# Patient Record
Sex: FEMALE | Employment: UNEMPLOYED | URBAN - NONMETROPOLITAN AREA
[De-identification: names, ages, dates, MRNs, and addresses within clinical notes are randomized per-mention and may not be internally consistent; named-entity substitution may affect disease eponyms.]

---

## 2024-05-26 ENCOUNTER — HOSPITAL ENCOUNTER (INPATIENT)
Facility: HOSPITAL | Age: 54
LOS: 7 days | Discharge: HOME | DRG: 291 | End: 2024-06-02
Attending: EMERGENCY MEDICINE | Admitting: HOSPITALIST
Payer: COMMERCIAL

## 2024-05-26 ENCOUNTER — APPOINTMENT (EMERGENCY)
Dept: CT IMAGING | Facility: HOSPITAL | Age: 54
DRG: 291 | End: 2024-05-26
Payer: COMMERCIAL

## 2024-05-26 DIAGNOSIS — J18.9 MULTIFOCAL PNEUMONIA: Primary | ICD-10-CM

## 2024-05-26 DIAGNOSIS — D86.9 SARCOIDOSIS: ICD-10-CM

## 2024-05-26 DIAGNOSIS — I50.9 CHF (CONGESTIVE HEART FAILURE) (HCC): ICD-10-CM

## 2024-05-26 DIAGNOSIS — R91.8 BILATERAL PULMONARY INFILTRATES: ICD-10-CM

## 2024-05-26 PROBLEM — I10 PRIMARY HYPERTENSION: Status: ACTIVE | Noted: 2024-05-26

## 2024-05-26 PROBLEM — E87.6 HYPOKALEMIA: Status: ACTIVE | Noted: 2024-05-26

## 2024-05-26 PROBLEM — E66.01 CLASS 3 SEVERE OBESITY DUE TO EXCESS CALORIES IN ADULT (HCC): Status: ACTIVE | Noted: 2024-05-26

## 2024-05-26 PROBLEM — Z85.060 HISTORY OF MALIGNANT CARCINOID TUMOR OF SMALL INTESTINE: Status: ACTIVE | Noted: 2024-05-26

## 2024-05-26 PROBLEM — M79.7 FIBROMYALGIA: Status: ACTIVE | Noted: 2024-05-26

## 2024-05-26 PROBLEM — D61.818 PANCYTOPENIA (HCC): Status: ACTIVE | Noted: 2024-05-26

## 2024-05-26 PROBLEM — E66.813 CLASS 3 SEVERE OBESITY DUE TO EXCESS CALORIES IN ADULT (HCC): Status: ACTIVE | Noted: 2024-05-26

## 2024-05-26 PROBLEM — D50.9 IRON DEFICIENCY ANEMIA: Status: ACTIVE | Noted: 2024-05-26

## 2024-05-26 PROBLEM — K74.69 OTHER CIRRHOSIS OF LIVER (HCC): Status: ACTIVE | Noted: 2024-05-26

## 2024-05-26 PROBLEM — F41.9 ANXIETY: Status: ACTIVE | Noted: 2024-05-26

## 2024-05-26 LAB
2HR DELTA HS TROPONIN: 0 NG/L
4HR DELTA HS TROPONIN: 0 NG/L
ALBUMIN SERPL BCP-MCNC: 3.4 G/DL (ref 3.5–5)
ALP SERPL-CCNC: 178 U/L (ref 34–104)
ALT SERPL W P-5'-P-CCNC: 15 U/L (ref 7–52)
ANION GAP SERPL CALCULATED.3IONS-SCNC: 6 MMOL/L (ref 4–13)
APTT PPP: 28 SECONDS (ref 23–37)
AST SERPL W P-5'-P-CCNC: 38 U/L (ref 13–39)
BASOPHILS # BLD AUTO: 0.04 THOUSANDS/ÂΜL (ref 0–0.1)
BASOPHILS NFR BLD AUTO: 2 % (ref 0–1)
BILIRUB SERPL-MCNC: 1.48 MG/DL (ref 0.2–1)
BNP SERPL-MCNC: 133 PG/ML (ref 0–100)
BUN SERPL-MCNC: 6 MG/DL (ref 5–25)
CALCIUM ALBUM COR SERPL-MCNC: 8.6 MG/DL (ref 8.3–10.1)
CALCIUM SERPL-MCNC: 8.1 MG/DL (ref 8.4–10.2)
CARDIAC TROPONIN I PNL SERPL HS: 17 NG/L
CHLORIDE SERPL-SCNC: 105 MMOL/L (ref 96–108)
CO2 SERPL-SCNC: 29 MMOL/L (ref 21–32)
CREAT SERPL-MCNC: 0.57 MG/DL (ref 0.6–1.3)
EOSINOPHIL # BLD AUTO: 0.04 THOUSAND/ÂΜL (ref 0–0.61)
EOSINOPHIL NFR BLD AUTO: 2 % (ref 0–6)
ERYTHROCYTE [DISTWIDTH] IN BLOOD BY AUTOMATED COUNT: 14 % (ref 11.6–15.1)
FLUAV RNA RESP QL NAA+PROBE: NEGATIVE
FLUBV RNA RESP QL NAA+PROBE: NEGATIVE
GFR SERPL CREATININE-BSD FRML MDRD: 105 ML/MIN/1.73SQ M
GLUCOSE SERPL-MCNC: 122 MG/DL (ref 65–140)
HCT VFR BLD AUTO: 33.1 % (ref 34.8–46.1)
HGB BLD-MCNC: 10.7 G/DL (ref 11.5–15.4)
IMM GRANULOCYTES # BLD AUTO: 0.01 THOUSAND/UL (ref 0–0.2)
IMM GRANULOCYTES NFR BLD AUTO: 0 % (ref 0–2)
INR PPP: 1.27 (ref 0.84–1.19)
LACTATE SERPL-SCNC: 1.8 MMOL/L (ref 0.5–2)
LIPASE SERPL-CCNC: 11 U/L (ref 11–82)
LYMPHOCYTES # BLD AUTO: 0.41 THOUSANDS/ÂΜL (ref 0.6–4.47)
LYMPHOCYTES NFR BLD AUTO: 16 % (ref 14–44)
MAGNESIUM SERPL-MCNC: 1.7 MG/DL (ref 1.9–2.7)
MCH RBC QN AUTO: 33.2 PG (ref 26.8–34.3)
MCHC RBC AUTO-ENTMCNC: 32.3 G/DL (ref 31.4–37.4)
MCV RBC AUTO: 103 FL (ref 82–98)
MONOCYTES # BLD AUTO: 0.14 THOUSAND/ÂΜL (ref 0.17–1.22)
MONOCYTES NFR BLD AUTO: 6 % (ref 4–12)
NEUTROPHILS # BLD AUTO: 1.9 THOUSANDS/ÂΜL (ref 1.85–7.62)
NEUTS SEG NFR BLD AUTO: 74 % (ref 43–75)
NRBC BLD AUTO-RTO: 0 /100 WBCS
PLATELET # BLD AUTO: 64 THOUSANDS/UL (ref 149–390)
PMV BLD AUTO: 9.7 FL (ref 8.9–12.7)
POTASSIUM SERPL-SCNC: 3.3 MMOL/L (ref 3.5–5.3)
PROCALCITONIN SERPL-MCNC: <0.05 NG/ML
PROT SERPL-MCNC: 7 G/DL (ref 6.4–8.4)
PROTHROMBIN TIME: 16.3 SECONDS (ref 11.6–14.5)
RBC # BLD AUTO: 3.22 MILLION/UL (ref 3.81–5.12)
RSV RNA RESP QL NAA+PROBE: NEGATIVE
SARS-COV-2 RNA RESP QL NAA+PROBE: NEGATIVE
SODIUM SERPL-SCNC: 140 MMOL/L (ref 135–147)
WBC # BLD AUTO: 2.54 THOUSAND/UL (ref 4.31–10.16)

## 2024-05-26 PROCEDURE — 99285 EMERGENCY DEPT VISIT HI MDM: CPT

## 2024-05-26 PROCEDURE — 94640 AIRWAY INHALATION TREATMENT: CPT

## 2024-05-26 PROCEDURE — 0241U HB NFCT DS VIR RESP RNA 4 TRGT: CPT | Performed by: EMERGENCY MEDICINE

## 2024-05-26 PROCEDURE — 94664 DEMO&/EVAL PT USE INHALER: CPT

## 2024-05-26 PROCEDURE — 85610 PROTHROMBIN TIME: CPT | Performed by: EMERGENCY MEDICINE

## 2024-05-26 PROCEDURE — 84145 PROCALCITONIN (PCT): CPT | Performed by: HOSPITALIST

## 2024-05-26 PROCEDURE — 94760 N-INVAS EAR/PLS OXIMETRY 1: CPT

## 2024-05-26 PROCEDURE — 71275 CT ANGIOGRAPHY CHEST: CPT

## 2024-05-26 PROCEDURE — 96375 TX/PRO/DX INJ NEW DRUG ADDON: CPT

## 2024-05-26 PROCEDURE — 87040 BLOOD CULTURE FOR BACTERIA: CPT | Performed by: EMERGENCY MEDICINE

## 2024-05-26 PROCEDURE — 36415 COLL VENOUS BLD VENIPUNCTURE: CPT | Performed by: EMERGENCY MEDICINE

## 2024-05-26 PROCEDURE — 80053 COMPREHEN METABOLIC PANEL: CPT | Performed by: EMERGENCY MEDICINE

## 2024-05-26 PROCEDURE — 84484 ASSAY OF TROPONIN QUANT: CPT | Performed by: INTERNAL MEDICINE

## 2024-05-26 PROCEDURE — 85730 THROMBOPLASTIN TIME PARTIAL: CPT | Performed by: EMERGENCY MEDICINE

## 2024-05-26 PROCEDURE — 85025 COMPLETE CBC W/AUTO DIFF WBC: CPT | Performed by: EMERGENCY MEDICINE

## 2024-05-26 PROCEDURE — 83690 ASSAY OF LIPASE: CPT | Performed by: EMERGENCY MEDICINE

## 2024-05-26 PROCEDURE — 93005 ELECTROCARDIOGRAM TRACING: CPT

## 2024-05-26 PROCEDURE — 99285 EMERGENCY DEPT VISIT HI MDM: CPT | Performed by: EMERGENCY MEDICINE

## 2024-05-26 PROCEDURE — 83880 ASSAY OF NATRIURETIC PEPTIDE: CPT | Performed by: EMERGENCY MEDICINE

## 2024-05-26 PROCEDURE — 83605 ASSAY OF LACTIC ACID: CPT | Performed by: EMERGENCY MEDICINE

## 2024-05-26 PROCEDURE — 84484 ASSAY OF TROPONIN QUANT: CPT | Performed by: EMERGENCY MEDICINE

## 2024-05-26 PROCEDURE — 83735 ASSAY OF MAGNESIUM: CPT | Performed by: EMERGENCY MEDICINE

## 2024-05-26 PROCEDURE — 96365 THER/PROPH/DIAG IV INF INIT: CPT

## 2024-05-26 PROCEDURE — 99223 1ST HOSP IP/OBS HIGH 75: CPT | Performed by: INTERNAL MEDICINE

## 2024-05-26 RX ORDER — FUROSEMIDE 10 MG/ML
40 INJECTION INTRAMUSCULAR; INTRAVENOUS ONCE
Status: COMPLETED | OUTPATIENT
Start: 2024-05-26 | End: 2024-05-26

## 2024-05-26 RX ORDER — FUROSEMIDE 20 MG/1
20 TABLET ORAL DAILY
COMMUNITY
Start: 2024-04-08 | End: 2024-06-02

## 2024-05-26 RX ORDER — CEFTRIAXONE 1 G/50ML
1000 INJECTION, SOLUTION INTRAVENOUS ONCE
Status: COMPLETED | OUTPATIENT
Start: 2024-05-26 | End: 2024-05-26

## 2024-05-26 RX ORDER — ACETAMINOPHEN 325 MG/1
650 TABLET ORAL EVERY 6 HOURS PRN
Status: DISCONTINUED | OUTPATIENT
Start: 2024-05-26 | End: 2024-06-02 | Stop reason: HOSPADM

## 2024-05-26 RX ORDER — BENZONATATE 100 MG/1
100 CAPSULE ORAL 3 TIMES DAILY PRN
Status: DISCONTINUED | OUTPATIENT
Start: 2024-05-26 | End: 2024-06-02 | Stop reason: HOSPADM

## 2024-05-26 RX ORDER — AMITRIPTYLINE HYDROCHLORIDE 75 MG/1
75 TABLET ORAL
COMMUNITY
Start: 2024-04-08

## 2024-05-26 RX ORDER — FUROSEMIDE 10 MG/ML
40 INJECTION INTRAMUSCULAR; INTRAVENOUS 2 TIMES DAILY
Status: DISCONTINUED | OUTPATIENT
Start: 2024-05-27 | End: 2024-05-29

## 2024-05-26 RX ORDER — MAGNESIUM SULFATE HEPTAHYDRATE 40 MG/ML
2 INJECTION, SOLUTION INTRAVENOUS ONCE
Status: COMPLETED | OUTPATIENT
Start: 2024-05-26 | End: 2024-05-26

## 2024-05-26 RX ORDER — LISINOPRIL 20 MG/1
40 TABLET ORAL DAILY
Status: DISCONTINUED | OUTPATIENT
Start: 2024-05-27 | End: 2024-05-29

## 2024-05-26 RX ORDER — POTASSIUM CHLORIDE 20 MEQ/1
40 TABLET, EXTENDED RELEASE ORAL ONCE
Status: COMPLETED | OUTPATIENT
Start: 2024-05-26 | End: 2024-05-26

## 2024-05-26 RX ORDER — HEPARIN SODIUM 5000 [USP'U]/ML
5000 INJECTION, SOLUTION INTRAVENOUS; SUBCUTANEOUS EVERY 8 HOURS SCHEDULED
Status: DISCONTINUED | OUTPATIENT
Start: 2024-05-27 | End: 2024-05-28

## 2024-05-26 RX ORDER — LACTULOSE 10 G/15ML
10 SOLUTION ORAL; RECTAL 4 TIMES DAILY
COMMUNITY
Start: 2024-04-09 | End: 2024-06-02

## 2024-05-26 RX ORDER — AMITRIPTYLINE HYDROCHLORIDE 25 MG/1
75 TABLET, FILM COATED ORAL
Status: DISCONTINUED | OUTPATIENT
Start: 2024-05-26 | End: 2024-06-02 | Stop reason: HOSPADM

## 2024-05-26 RX ORDER — OXYCODONE AND ACETAMINOPHEN 10; 325 MG/1; MG/1
1 TABLET ORAL EVERY 6 HOURS PRN
COMMUNITY
Start: 2024-05-15 | End: 2024-06-02

## 2024-05-26 RX ORDER — HYDROXYCHLOROQUINE SULFATE 200 MG/1
200 TABLET, FILM COATED ORAL 2 TIMES DAILY WITH MEALS
COMMUNITY

## 2024-05-26 RX ORDER — RIFAXIMIN 550 MG/1
550 TABLET ORAL 2 TIMES DAILY
COMMUNITY
Start: 2024-03-26 | End: 2024-09-22

## 2024-05-26 RX ORDER — IPRATROPIUM BROMIDE AND ALBUTEROL SULFATE 2.5; .5 MG/3ML; MG/3ML
3 SOLUTION RESPIRATORY (INHALATION) EVERY 6 HOURS PRN
Status: DISCONTINUED | OUTPATIENT
Start: 2024-05-26 | End: 2024-06-02 | Stop reason: HOSPADM

## 2024-05-26 RX ORDER — CEFTRIAXONE 1 G/50ML
1000 INJECTION, SOLUTION INTRAVENOUS EVERY 24 HOURS
Status: DISCONTINUED | OUTPATIENT
Start: 2024-05-27 | End: 2024-05-31

## 2024-05-26 RX ORDER — FOLIC ACID 1 MG/1
1 TABLET ORAL DAILY
COMMUNITY
Start: 2016-08-04 | End: 2025-02-28

## 2024-05-26 RX ORDER — CYCLOBENZAPRINE HCL 10 MG
5 TABLET ORAL 3 TIMES DAILY PRN
COMMUNITY
Start: 2024-04-08 | End: 2024-06-02

## 2024-05-26 RX ORDER — OXYCODONE HYDROCHLORIDE 10 MG/1
10 TABLET ORAL EVERY 4 HOURS PRN
Status: DISCONTINUED | OUTPATIENT
Start: 2024-05-26 | End: 2024-06-02 | Stop reason: HOSPADM

## 2024-05-26 RX ORDER — LISINOPRIL 40 MG/1
40 TABLET ORAL DAILY
COMMUNITY
End: 2024-06-02

## 2024-05-26 RX ORDER — HYDROXYCHLOROQUINE SULFATE 200 MG/1
200 TABLET, FILM COATED ORAL 2 TIMES DAILY WITH MEALS
Status: DISCONTINUED | OUTPATIENT
Start: 2024-05-27 | End: 2024-06-02 | Stop reason: HOSPADM

## 2024-05-26 RX ADMIN — IPRATROPIUM BROMIDE AND ALBUTEROL SULFATE 3 ML: 2.5; .5 SOLUTION RESPIRATORY (INHALATION) at 22:05

## 2024-05-26 RX ADMIN — FUROSEMIDE 40 MG: 10 INJECTION, SOLUTION INTRAMUSCULAR; INTRAVENOUS at 16:34

## 2024-05-26 RX ADMIN — POTASSIUM CHLORIDE 40 MEQ: 1500 TABLET, EXTENDED RELEASE ORAL at 15:28

## 2024-05-26 RX ADMIN — OXYCODONE HYDROCHLORIDE 10 MG: 10 TABLET ORAL at 21:45

## 2024-05-26 RX ADMIN — AMITRIPTYLINE HYDROCHLORIDE 75 MG: 25 TABLET, FILM COATED ORAL at 21:45

## 2024-05-26 RX ADMIN — MAGNESIUM SULFATE HEPTAHYDRATE 2 G: 40 INJECTION, SOLUTION INTRAVENOUS at 20:18

## 2024-05-26 RX ADMIN — RIFAXIMIN 550 MG: 550 TABLET ORAL at 20:16

## 2024-05-26 RX ADMIN — CEFTRIAXONE 1000 MG: 1 INJECTION, SOLUTION INTRAVENOUS at 16:35

## 2024-05-26 RX ADMIN — IOHEXOL 74 ML: 350 INJECTION, SOLUTION INTRAVENOUS at 15:35

## 2024-05-26 RX ADMIN — ACETAMINOPHEN 325MG 650 MG: 325 TABLET ORAL at 20:16

## 2024-05-26 RX ADMIN — AZITHROMYCIN MONOHYDRATE 500 MG: 500 INJECTION, POWDER, LYOPHILIZED, FOR SOLUTION INTRAVENOUS at 17:27

## 2024-05-26 NOTE — ASSESSMENT & PLAN NOTE
Noted history of chronic diffuse pain  Has been referred to Rheumatology in past  Continue Plaquenil

## 2024-05-26 NOTE — ASSESSMENT & PLAN NOTE
History gastric bypass  Follows with reading Heme/Onc  Currently Hgb around baseline (10.4 on outpatient labs early may)

## 2024-05-26 NOTE — H&P
"[facility]  H&P  Name: Bailey Olsen 53 y.o. female I MRN: [id number]  Unit/Bed#: -01 I Date of Admission: 5/26/2024   Date of Service: 5/26/2024 I Hospital Day: 0      Assessment & Plan   * Bilateral pulmonary infiltrates  Assessment & Plan  53-year-old female with history of cirrhosis, carcinoid tumor who initially presented with shortness of breath peripheral edema  CTA revealed \"Bilateral multi lobar consolidation with evidence of interstitial edema, and small bilateral pleural effusions. Findings may represent multifocal pneumonia (infectious or organizing pneumonia) with superimposed vascular congestion. Pulmonary hemorrhage\"  Currently afebrile, WBC count within normal limits  Pro-Ernesto within normal limits placed on ceftriaxone for now  Consolidations may be secondary to CHF  Will provide IV Lasix as outlined below  Consider pulmonology consult for further evaluation    Acute on chronic congestive heart failure (HCC)  Assessment & Plan  Wt Readings from Last 3 Encounters:   05/26/24 133 kg (294 lb)     Presented with shortness of breath with exertion and peripheral edema  CT scan revealed pulmonary edema  Will provide IV Lasix  Will check echo  Consider cardiology consult          Sarcoidosis  Assessment & Plan  History of sarcoidosis of the skin, appears stable    Pancytopenia (HCC)  Assessment & Plan  Chronic, suspected to be from splenomegaly and liver cirrhosis  Follows with reading Heme/Onc  Cell counts on admission appear stable from most recent outpatient labs in early May  trend    Iron deficiency anemia  Assessment & Plan  History gastric bypass  Follows with reading Heme/Onc  Currently Hgb around baseline (10.4 on outpatient labs early may)      Fibromyalgia  Assessment & Plan  Noted history of chronic diffuse pain  Has been referred to Rheumatology in past  Continue Plaquenil    Hypokalemia  Assessment & Plan  3.3 on admission  Replete, trend    Mg 1.7  Replete, " "trend    Anxiety  Assessment & Plan  Anxiety/Depression  Continue amitryptiline    Primary hypertension  Assessment & Plan  Hypertensive on admission, Hx HTN in chart  On lasix  Continue lisinopril   Follow BPs    Class 3 severe obesity due to excess calories in adult (HCC)  Assessment & Plan  Dietary and lifestyle changes    History of malignant carcinoid tumor of small intestine  Assessment & Plan  \"- GI Carcinoid tumor, dx 2006 involved small bowel/ duodenum and portal lymph nodes, s/p debulking in 7/2008 could not fully resect and was recieving Sandostatin q4 weeks at [facility] and follow-up 24 hr urine 5-HIAA level and chromogranin A level as well as octreotide scan or Gallium/PET scan  - Transfer care to [facility] in 8/2018. continued on Sandostatin Q 28 days. Overall tolerates treatment well. Continue current treatment    - Carcinoid tumor marker Chromogranin A level has been normal however 5 HIAA has increased. Finally insurance approved Gallium/ PET scan 8/2/2022 report stable disease.\"   -- per recent Heme Onc note from [place]    Other cirrhosis of liver (HCC)  Assessment & Plan  Due to NAFLD  Hx HE  3/19/24 EGD with Grade 1 Varices  LFTs remain stable  -continue Xifaxin, lactulose, and lasix         VTE Prophylaxis: Heparin  / sequential compression device   Code Status: full code  POLST: There is no POLST form on file for this patient (pre-hospital)  Discussion with family: pt    Anticipated Length of Stay:  Patient will be admitted on an Inpatient basis with an anticipated length of stay of  > 2 midnights.   Justification for Hospital Stay: Pulmonary consolidation    Total Time for Visit, including Counseling / Coordination of Care: 60 minutes.  Greater than 50% of this total time spent on direct patient counseling and coordination of care.    Chief Complaint:   Shortness of breath    History of Present Illness:    Bailey Olsen is a 53 y.o. female with past medical history significant for " sarcoidosis, carcinoid tumor of the duodenum, HYATT cirrhosis, CHF initially presented with shortness of breath.  Patient reports shortness of breath for the past 3 to 4 days.  Is worse with exertion.  Also notes lower extremity swelling.  She otherwise denies any acute complaints.  Denies fevers, chills, abdominal pain, nausea, vomiting, diarrhea or any other complaints    Review of Systems:    Review of Systems   Constitutional:  Negative for activity change, appetite change, chills, diaphoresis, fever and unexpected weight change.   HENT:  Negative for congestion, facial swelling and rhinorrhea.    Eyes:  Negative for photophobia and visual disturbance.   Respiratory:  Positive for shortness of breath. Negative for cough and wheezing.    Cardiovascular:  Positive for leg swelling. Negative for chest pain and palpitations.   Gastrointestinal:  Negative for abdominal pain, blood in stool, constipation, diarrhea, nausea and vomiting.   Genitourinary:  Negative for decreased urine volume, difficulty urinating, dysuria, flank pain, frequency, hematuria and urgency.   Musculoskeletal:  Negative for arthralgias, back pain, joint swelling and myalgias.   Neurological:  Negative for dizziness, syncope, facial asymmetry, light-headedness, numbness and headaches.   Psychiatric/Behavioral:  Negative for confusion and decreased concentration. The patient is not nervous/anxious.        Past Medical and Surgical History:     Past Medical History:   Diagnosis Date    Carcinoid tumor     neuroendocrine    CHF (congestive heart failure) (HCC)     Cirrhosis of liver (HCC)        History reviewed. No pertinent surgical history.    Meds/Allergies:    Prior to Admission medications    Medication Sig Start Date End Date Taking? Authorizing Provider   amitriptyline (ELAVIL) 75 mg tablet Take 75 mg by mouth daily at bedtime 4/8/24  Yes Historical Provider, MD   cyclobenzaprine (FLEXERIL) 10 mg tablet Take 5 mg by mouth Three times daily  "as needed 4/8/24 4/8/25 Yes Historical Provider, MD   Ergocalciferol (VITAMIN D2 PO) Take 1 capsule by mouth once a week Mondays   Yes Historical Provider, MD   folic acid (FOLVITE) 1 mg tablet Take 1 mg by mouth daily 8/4/16 2/28/25 Yes Historical Provider, MD   furosemide (LASIX) 20 mg tablet Take 20 mg by mouth daily 4/8/24  Yes Historical Provider, MD   hydroxychloroquine (PLAQUENIL) 200 mg tablet Take 200 mg by mouth 2 (two) times a day with meals   Yes Historical Provider, MD   lactulose 10 g/15 mL Take 10 g by mouth 4 (four) times a day 4/9/24  Yes Historical Provider, MD   lisinopril (ZESTRIL) 40 mg tablet Take 40 mg by mouth daily   Yes Historical Provider, MD   oxyCODONE-acetaminophen (PERCOCET)  mg per tablet Take 1 tablet by mouth every 6 (six) hours as needed 5/15/24  Yes Historical Provider, MD   Xifaxan 550 MG tablet Take 550 mg by mouth 2 (two) times a day 3/26/24 9/22/24 Yes Historical Provider, MD     I have reviewed home medications with patient personally.    Allergies: No Known Allergies    Social History:     Marital Status: /Civil Union     Patient Pre-hospital Living Situation: chf  Patient Pre-hospital Level of Mobility: independent  Patient Pre-hospital Diet Restrictions: chf  Substance Use History:   Social History     Substance and Sexual Activity   Alcohol Use Never     Social History     Tobacco Use   Smoking Status Never   Smokeless Tobacco Never     Social History     Substance and Sexual Activity   Drug Use Never       Family History:    History reviewed. No pertinent family history.    Physical Exam:     Vitals:   Blood Pressure: 120/70 (05/26/24 1757)  Pulse: 89 (05/26/24 1823)  Temperature: 98.4 °F (36.9 °C) (05/26/24 1757)  Temp Source: Temporal (05/26/24 1438)  Respirations: 22 (05/26/24 1757)  Height: 5' 5\" (165.1 cm) (05/26/24 1823)  Weight - Scale: 133 kg (294 lb) (05/26/24 1823)  SpO2: 91 % (05/26/24 1804)    Physical Exam  Constitutional:       General: She is " not in acute distress.     Appearance: She is well-developed. She is not diaphoretic.   HENT:      Head: Normocephalic and atraumatic.      Nose: Nose normal.      Mouth/Throat:      Mouth: Oropharynx is clear and moist.      Pharynx: No oropharyngeal exudate.   Eyes:      General: No scleral icterus.        Right eye: No discharge.         Left eye: No discharge.      Extraocular Movements: EOM normal.      Conjunctiva/sclera: Conjunctivae normal.   Neck:      Thyroid: No thyromegaly.      Vascular: No JVD.   Cardiovascular:      Rate and Rhythm: Normal rate and regular rhythm.      Heart sounds: Normal heart sounds. No murmur heard.     No friction rub. No gallop.   Pulmonary:      Effort: Pulmonary effort is normal. No respiratory distress.      Breath sounds: Normal breath sounds. No wheezing or rales.   Chest:      Chest wall: No tenderness.   Abdominal:      General: Bowel sounds are normal. There is no distension.      Palpations: Abdomen is soft.      Tenderness: There is no abdominal tenderness. There is no guarding or rebound.   Musculoskeletal:         General: No tenderness, deformity or edema. Normal range of motion.      Cervical back: Normal range of motion and neck supple.   Skin:     General: Skin is warm and dry.      Findings: No erythema or rash.   Neurological:      Mental Status: She is alert. Mental status is at baseline.      Cranial Nerves: No cranial nerve deficit.      Sensory: No sensory deficit.      Motor: No abnormal muscle tone.      Coordination: Coordination normal.   Psychiatric:         Mood and Affect: Mood and affect normal.           Additional Data:     Lab Results: I have personally reviewed pertinent reports.      Results from last 7 days   Lab Units 05/26/24  1446   WBC Thousand/uL 2.54*   HEMOGLOBIN g/dL 10.7*   HEMATOCRIT % 33.1*   PLATELETS Thousands/uL 64*   SEGS PCT % 74   LYMPHO PCT % 16   MONO PCT % 6   EOS PCT % 2     Results from last 7 days   Lab Units  05/26/24  1446   SODIUM mmol/L 140   POTASSIUM mmol/L 3.3*   CHLORIDE mmol/L 105   CO2 mmol/L 29   BUN mg/dL 6   CREATININE mg/dL 0.57*   ANION GAP mmol/L 6   CALCIUM mg/dL 8.1*   ALBUMIN g/dL 3.4*   TOTAL BILIRUBIN mg/dL 1.48*   ALK PHOS U/L 178*   ALT U/L 15   AST U/L 38   GLUCOSE RANDOM mg/dL 122     Results from last 7 days   Lab Units 05/26/24  1446   INR  1.27*             Results from last 7 days   Lab Units 05/26/24  1446   LACTIC ACID mmol/L 1.8   PROCALCITONIN ng/ml <0.05       Imaging: I have personally reviewed pertinent reports.      CTA ED chest PE study   Final Result by Bright Jones MD (05/26 1615)      No pulmonary embolus.      Bilateral multi lobar consolidation with evidence of interstitial edema, and small bilateral pleural effusions. Findings may represent multifocal pneumonia (infectious or organizing pneumonia) with superimposed vascular congestion. Pulmonary hemorrhage    would also be a consideration.      The study was marked in EPIC for immediate notification.            Workstation performed: [id number]             EKG, Pathology, and Other Studies Reviewed on Admission:   EKG: reviewed    Allscripts / Epic Records Reviewed: Yes     ** Please Note: This note has been constructed using a voice recognition system. **

## 2024-05-26 NOTE — ASSESSMENT & PLAN NOTE
Wt Readings from Last 3 Encounters:   05/26/24 133 kg (294 lb)     Presented with shortness of breath with exertion and peripheral edema  CT scan revealed pulmonary edema  Will provide IV Lasix  Will check echo  Consider cardiology consult

## 2024-05-26 NOTE — ASSESSMENT & PLAN NOTE
Due to NAFLD  Hx HE  3/19/24 EGD with Grade 1 Varices  LFTs remain stable  -continue Xifaxin, lactulose, and lasix

## 2024-05-26 NOTE — ASSESSMENT & PLAN NOTE
"53-year-old female with history of cirrhosis, carcinoid tumor who initially presented with shortness of breath peripheral edema  CTA revealed \"Bilateral multi lobar consolidation with evidence of interstitial edema, and small bilateral pleural effusions. Findings may represent multifocal pneumonia (infectious or organizing pneumonia) with superimposed vascular congestion. Pulmonary hemorrhage\"  Currently afebrile, WBC count within normal limits  Pro-Ernesto within normal limits placed on ceftriaxone for now  Consolidations may be secondary to CHF  Will provide IV Lasix as outlined below  Consider pulmonology consult for further evaluation  "

## 2024-05-26 NOTE — PLAN OF CARE
Problem: PAIN - ADULT  Goal: Verbalizes/displays adequate comfort level or baseline comfort level  Description: Interventions:  - Encourage patient to monitor pain and request assistance  - Assess pain using appropriate pain scale  - Administer analgesics based on type and severity of pain and evaluate response  - Implement non-pharmacological measures as appropriate and evaluate response  - Consider cultural and social influences on pain and pain management  - Notify physician/advanced practitioner if interventions unsuccessful or patient reports new pain  Outcome: Progressing     Problem: INFECTION - ADULT  Goal: Absence or prevention of progression during hospitalization  Description: INTERVENTIONS:  - Assess and monitor for signs and symptoms of infection  - Monitor lab/diagnostic results  - Monitor all insertion sites, i.e. indwelling lines, tubes, and drains  - Monitor endotracheal if appropriate and nasal secretions for changes in amount and color  - [place] appropriate cooling/warming therapies per order  - Administer medications as ordered  - Instruct and encourage patient and family to use good hand hygiene technique  - Identify and instruct in appropriate isolation precautions for identified infection/condition  Outcome: Progressing     Problem: RESPIRATORY - ADULT  Goal: Achieves optimal ventilation and oxygenation  Description: INTERVENTIONS:  - Assess for changes in respiratory status  - Assess for changes in mentation and behavior  - Position to facilitate oxygenation and minimize respiratory effort  - Oxygen administered by appropriate delivery if ordered  - Initiate smoking cessation education as indicated  - Encourage broncho-pulmonary hygiene including cough, deep breathe, Incentive Spirometry  - Assess the need for suctioning and aspirate as needed  - Assess and instruct to report SOB or any respiratory difficulty  - Respiratory Therapy support as indicated  Outcome: Progressing

## 2024-05-26 NOTE — ASSESSMENT & PLAN NOTE
"\"- GI Carcinoid tumor, dx 2006 involved small bowel/ duodenum and portal lymph nodes, s/p debulking in 7/2008 could not fully resect and was recieving Sandostatin q4 weeks at [facility] and follow-up 24 hr urine 5-HIAA level and chromogranin A level as well as octreotide scan or Gallium/PET scan  - Transfer care to [facility] in 8/2018. continued on Sandostatin Q 28 days. Overall tolerates treatment well. Continue current treatment    - Carcinoid tumor marker Chromogranin A level has been normal however 5 HIAA has increased. Finally insurance approved Gallium/ PET scan 8/2/2022 report stable disease.\"   -- per recent Heme Onc note from [place]  "

## 2024-05-26 NOTE — ASSESSMENT & PLAN NOTE
Chronic, suspected to be from splenomegaly and liver cirrhosis  Follows with reading Heme/Onc  Cell counts on admission appear stable from most recent outpatient labs in early May  trend

## 2024-05-26 NOTE — ED PROVIDER NOTES
History  Chief Complaint   Patient presents with    Shortness of Breath     Pt has increasing sob over the past few days.      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  Now complains of increasing shortness of breath over the last 3 to 4 days.  No chest pain.  Increased leg swelling.  No history of COPD.  Does not smoke.  Has a cough that is nonproductive.  No fevers or chills.  No rash.      History provided by:  Patient   used: No    Shortness of Breath  Severity:  Mild  Onset quality:  Gradual  Duration:  3 days  Timing:  Constant  Progression:  Worsening  Chronicity:  New  Context: not activity, not emotional upset, not occupational exposure and not strong odors    Relieved by:  Nothing  Worsened by:  Exertion  Ineffective treatments:  None tried  Associated symptoms: cough    Associated symptoms: no abdominal pain, no chest pain, no claudication, no ear pain, no fever, no headaches, no neck pain, no PND, no rash, no sore throat, no syncope, no vomiting and no wheezing        None       Past Medical History:   Diagnosis Date    Carcinoid tumor     neuroendocrine    CHF (congestive heart failure) (HCC)     Cirrhosis of liver (HCC)        History reviewed. No pertinent surgical history.    History reviewed. No pertinent family history.  I have reviewed and agree with the history as documented.    E-Cigarette/Vaping    E-Cigarette Use Never User      E-Cigarette/Vaping Substances     Social History     Tobacco Use    Smoking status: Never    Smokeless tobacco: Never   Vaping Use    Vaping status: Never Used   Substance Use Topics    Alcohol use: Never    Drug use: Never       Review of Systems   Constitutional:  Negative for chills and fever.   HENT:  Negative for ear pain, hearing loss, sore throat, trouble swallowing and voice change.    Eyes:  Negative for pain and discharge.   Respiratory:  Positive for cough and shortness of breath. Negative for  wheezing.    Cardiovascular:  Positive for leg swelling. Negative for chest pain, palpitations, claudication, syncope and PND.   Gastrointestinal:  Negative for abdominal pain, blood in stool, constipation, diarrhea, nausea and vomiting.   Genitourinary:  Negative for dysuria, flank pain, frequency and hematuria.   Musculoskeletal:  Negative for joint swelling, neck pain and neck stiffness.   Skin:  Negative for rash and wound.   Neurological:  Negative for dizziness, seizures, syncope, facial asymmetry and headaches.   Psychiatric/Behavioral:  Negative for hallucinations, self-injury and suicidal ideas.    All other systems reviewed and are negative.      Physical Exam  Physical Exam  Vitals and nursing note reviewed.   Constitutional:       General: She is not in acute distress.     Appearance: She is well-developed.   HENT:      Head: Normocephalic and atraumatic.      Right Ear: External ear normal.      Left Ear: External ear normal.   Eyes:      General: No scleral icterus.        Right eye: No discharge.         Left eye: No discharge.      Extraocular Movements: Extraocular movements intact.      Conjunctiva/sclera: Conjunctivae normal.   Cardiovascular:      Rate and Rhythm: Normal rate and regular rhythm.      Heart sounds: Normal heart sounds. No murmur heard.  Pulmonary:      Effort: Pulmonary effort is normal.      Breath sounds: Normal breath sounds. No wheezing or rales.   Abdominal:      General: Bowel sounds are normal. There is no distension.      Palpations: Abdomen is soft.      Tenderness: There is no abdominal tenderness. There is no guarding or rebound.   Musculoskeletal:         General: No deformity. Normal range of motion.      Cervical back: Normal range of motion and neck supple.      Right lower leg: Edema present.      Left lower leg: Edema present.   Skin:     General: Skin is warm and dry.      Findings: No rash.   Neurological:      General: No focal deficit present.      Mental  Status: She is alert and oriented to person, place, and time.      Cranial Nerves: No cranial nerve deficit.   Psychiatric:         Mood and Affect: Mood normal.         Behavior: Behavior normal.         Thought Content: Thought content normal.         Judgment: Judgment normal.         Vital Signs  ED Triage Vitals [05/26/24 1438]   Temperature Pulse Respirations Blood Pressure SpO2   98.1 °F (36.7 °C) 99 20 (!) 181/84 93 %      Temp Source Heart Rate Source Patient Position - Orthostatic VS BP Location FiO2 (%)   Temporal Monitor Lying Left arm --      Pain Score       5           Vitals:    05/26/24 1438 05/26/24 1630   BP: (!) 181/84 (!) 180/81   Pulse: 99 92   Patient Position - Orthostatic VS: Lying Lying         Visual Acuity      ED Medications  Medications   azithromycin (ZITHROMAX) 500 mg in sodium chloride 0.9 % 250 mL IVPB (has no administration in time range)   potassium chloride (Klor-Con M20) CR tablet 40 mEq (40 mEq Oral Given 5/26/24 1528)   iohexol (OMNIPAQUE) 350 MG/ML injection (MULTI-DOSE) 74 mL (74 mL Intravenous Given 5/26/24 1535)   cefTRIAXone (ROCEPHIN) IVPB (premix in dextrose) 1,000 mg 50 mL (1,000 mg Intravenous New Bag 5/26/24 1635)   furosemide (LASIX) injection 40 mg (40 mg Intravenous Given 5/26/24 1634)       Diagnostic Studies  Results Reviewed       Procedure Component Value Units Date/Time    HS Troponin I 2hr [[id number]]  (Normal) Collected: 05/26/24 1617    Lab Status: Final result Specimen: Blood from Arm, Right Updated: 05/26/24 1644     hs TnI 2hr 17 ng/L      Delta 2hr hsTnI 0 ng/L     Blood culture #1 [[id number]] Collected: 05/26/24 1634    Lab Status: In process Specimen: Blood from Arm, Left Updated: 05/26/24 1640    Blood culture #2 [[id number]] Collected: 05/26/24 1630    Lab Status: In process Specimen: Blood from Arm, Right Updated: 05/26/24 1640    HS Troponin I 4hr [[id number]]     Lab Status: No result Specimen: Blood     FLU/RSV/COVID - if FLU/RSV clinically  relevant [[id number]]  (Normal) Collected: 05/26/24 1446    Lab Status: Final result Specimen: Nares from Nose Updated: 05/26/24 1528     SARS-CoV-2 Negative     INFLUENZA A PCR Negative     INFLUENZA B PCR Negative     RSV PCR Negative    Narrative:      FOR PEDIATRIC PATIENTS - copy/paste COVID Guidelines URL to browser: [email or web address]    SARS-CoV-2 assay is a Nucleic Acid Amplification assay intended for the  qualitative detection of nucleic acid from SARS-CoV-2 in nasopharyngeal  swabs. Results are for the presumptive identification of SARS-CoV-2 RNA.    Positive results are indicative of infection with SARS-CoV-2, the virus  causing COVID-19, but do not rule out bacterial infection or co-infection  with other viruses. Laboratories within the [place] and its  territories are required to report all positive results to the appropriate  public health authorities. Negative results do not preclude SARS-CoV-2  infection and should not be used as the sole basis for treatment or other  patient management decisions. Negative results must be combined with  clinical observations, patient history, and epidemiological information.  This test has not been FDA cleared or approved.    This test has been authorized by FDA under an Emergency Use Authorization  (EUA). This test is only authorized for the duration of time the  declaration that circumstances exist justifying the authorization of the  emergency use of an in vitro diagnostic tests for detection of SARS-CoV-2  virus and/or diagnosis of COVID-19 infection under section 564(b)(1) of  the Act, 21 U.S.C. 360bbb-3(b)(1), unless the authorization is terminated  or revoked sooner. The test has been validated but independent review by FDA  and CLIA is pending.    Test performed using [employer] Gene[employer]pert: This RT-PCR assay targets N2,  a region unique to SARS-CoV-2. A conserved region in the E-gene was chosen  for  pan-Sarbecovirus detection which includes SARS-CoV-2.    According to CMS-2020-01-R, this platform meets the definition of high-throughput technology.    HS Troponin 0hr (reflex protocol) [[id number]]  (Normal) Collected: 05/26/24 1446    Lab Status: Final result Specimen: Blood from Arm, Right Updated: 05/26/24 1518     hs TnI 0hr 17 ng/L     B-Type Natriuretic Peptide(BNP) [[id number]]  (Abnormal) Collected: 05/26/24 1446    Lab Status: Final result Specimen: Blood from Arm, Right Updated: 05/26/24 1513      pg/mL     CBC and differential [[id number]]  (Abnormal) Collected: 05/26/24 1446    Lab Status: Final result Specimen: Blood from Arm, Right Updated: 05/26/24 1511     WBC 2.54 Thousand/uL      RBC 3.22 Million/uL      Hemoglobin 10.7 g/dL      Hematocrit 33.1 %       fL      MCH 33.2 pg      MCHC 32.3 g/dL      RDW 14.0 %      MPV 9.7 fL      Platelets 64 Thousands/uL      nRBC 0 /100 WBCs      Segmented % 74 %      Immature Grans % 0 %      Lymphocytes % 16 %      Monocytes % 6 %      Eosinophils Relative 2 %      Basophils Relative 2 %      Absolute Neutrophils 1.90 Thousands/µL      Absolute Immature Grans 0.01 Thousand/uL      Absolute Lymphocytes 0.41 Thousands/µL      Absolute Monocytes 0.14 Thousand/µL      Eosinophils Absolute 0.04 Thousand/µL      Basophils Absolute 0.04 Thousands/µL     Comprehensive metabolic panel [[id number]]  (Abnormal) Collected: 05/26/24 1446    Lab Status: Final result Specimen: Blood from Arm, Right Updated: 05/26/24 1507     Sodium 140 mmol/L      Potassium 3.3 mmol/L      Chloride 105 mmol/L      CO2 29 mmol/L      ANION GAP 6 mmol/L      BUN 6 mg/dL      Creatinine 0.57 mg/dL      Glucose 122 mg/dL      Calcium 8.1 mg/dL      Corrected Calcium 8.6 mg/dL      AST 38 U/L      ALT 15 U/L      Alkaline Phosphatase 178 U/L      Total Protein 7.0 g/dL      Albumin 3.4 g/dL      Total Bilirubin 1.48 mg/dL      eGFR 105 ml/min/1.73sq m     Narrative:      National  Kidney Disease Foundation guidelines for Chronic Kidney Disease (CKD):     Stage 1 with normal or high GFR (GFR > 90 mL/min/1.73 square meters)    Stage 2 Mild CKD (GFR = 60-89 mL/min/1.73 square meters)    Stage 3A Moderate CKD (GFR = 45-59 mL/min/1.73 square meters)    Stage 3B Moderate CKD (GFR = 30-44 mL/min/1.73 square meters)    Stage 4 Severe CKD (GFR = 15-29 mL/min/1.73 square meters)    Stage 5 End Stage CKD (GFR <15 mL/min/1.73 square meters)  Note: GFR calculation is accurate only with a steady state creatinine    Magnesium [[id number]]  (Abnormal) Collected: 05/26/24 1446    Lab Status: Final result Specimen: Blood from Arm, Right Updated: 05/26/24 1507     Magnesium 1.7 mg/dL     Lipase [[id number]]  (Normal) Collected: 05/26/24 1446    Lab Status: Final result Specimen: Blood from Arm, Right Updated: 05/26/24 1507     Lipase 11 u/L     Lactic acid, plasma (w/reflex if result > 2.0) [[id number]]  (Normal) Collected: 05/26/24 1446    Lab Status: Final result Specimen: Blood from Arm, Right Updated: 05/26/24 1507     LACTIC ACID 1.8 mmol/L     Narrative:      Result may be elevated if tourniquet was used during collection.    Protime-INR [[id number]]  (Abnormal) Collected: 05/26/24 1446    Lab Status: Final result Specimen: Blood from Arm, Right Updated: 05/26/24 1504     Protime 16.3 seconds      INR 1.27    APTT [[id number]]  (Normal) Collected: 05/26/24 1446    Lab Status: Final result Specimen: Blood from Arm, Right Updated: 05/26/24 1504     PTT 28 seconds                    CTA ED chest PE study   Final Result by Bright Jones MD (05/26 1615)      No pulmonary embolus.      Bilateral multi lobar consolidation with evidence of interstitial edema, and small bilateral pleural effusions. Findings may represent multifocal pneumonia (infectious or organizing pneumonia) with superimposed vascular congestion. Pulmonary hemorrhage    would also be a consideration.      The study was marked in EPIC for  immediate notification.            Workstation performed: [id number]                    Procedures  ECG 12 Lead Documentation Only    Date/Time: 5/26/2024 2:44 PM    Performed by: Bladimir Mcmanus MD  Authorized by: Bladimir Mcmanus MD    ECG reviewed by me, the ED Provider: yes    Patient location:  ED  Previous ECG:     Previous ECG:  Unavailable  Interpretation:     Interpretation: non-specific    Rate:     ECG rate:  100  Rhythm:     Rhythm: sinus rhythm    Ectopy:     Ectopy: none    QRS:     QRS axis:  Normal           ED Course  ED Course as of 05/26/24 1715   Sun May 26, 2024   1631 CAT scan results noted.  Doubt pulmonary hemorrhage.  Patient is on no blood thinners.  No hemoptysis.   1631 Patient gets short of breath even while talking to me.  States she had a hard time getting off the stretcher and onto the CAT scan table without being extremely short of breath.  Given the patient's sarcoid history and now with multifocal pneumonia will admit the patient for IV antibiotics.                               SBIRT 20yo+      Flowsheet Row Most Recent Value   Initial Alcohol Screen: US AUDIT-C     1. How often do you have a drink containing alcohol? 0 Filed at: 05/26/2024 1445   2. How many drinks containing alcohol do you have on a typical day you are drinking?  0 Filed at: 05/26/2024 1445   3a. Male UNDER 65: How often do you have five or more drinks on one occasion? 0 Filed at: 05/26/2024 1445   3b. FEMALE Any Age, or MALE 65+: How often do you have 4 or more drinks on one occassion? 0 Filed at: 05/26/2024 1445   Audit-C Score 0 Filed at: 05/26/2024 1445   BERNIE: How many times in the past year have you...    Used an illegal drug or used a prescription medication for non-medical reasons? Never Filed at: 05/26/2024 1445                      Medical Decision Making  Amount and/or Complexity of Data Reviewed  Labs: ordered.  Radiology: ordered.    Risk  Prescription drug management.  Decision regarding  hospitalization.             Disposition  Final diagnoses:   Multifocal pneumonia   CHF (congestive heart failure) (HCC)     Time reflects when diagnosis was documented in both MDM as applicable and the Disposition within this note       Time User Action Codes Description Comment    5/26/2024  5:14 PM Bladimir Mcmanus [J18.9] Multifocal pneumonia     5/26/2024  5:15 PM Bladimir Mcmanus [I50.9] CHF (congestive heart failure) (HCC)           ED Disposition       ED Disposition   Admit    Condition   Stable    Date/Time   Sun May 26, 2024 1651    Comment   Case was discussed with Dr. Mcbride and the patient's admission status was agreed to be Admission Status: inpatient status to the service of Dr. Mcbride.               Follow-up Information    None         Patient's Medications    No medications on file       No discharge procedures on file.    PDMP Review       None            ED Provider  Electronically Signed by             Bladimir Mcmanus MD  05/26/24 9931       Bladimir Mcmanus MD  05/26/24 0183

## 2024-05-27 LAB
ALBUMIN SERPL BCP-MCNC: 3 G/DL (ref 3.5–5)
ALP SERPL-CCNC: 175 U/L (ref 34–104)
ALT SERPL W P-5'-P-CCNC: 13 U/L (ref 7–52)
ANION GAP SERPL CALCULATED.3IONS-SCNC: 3 MMOL/L (ref 4–13)
AST SERPL W P-5'-P-CCNC: 33 U/L (ref 13–39)
BASOPHILS # BLD AUTO: 0.04 THOUSANDS/ÂΜL (ref 0–0.1)
BASOPHILS NFR BLD AUTO: 2 % (ref 0–1)
BILIRUB DIRECT SERPL-MCNC: 0.4 MG/DL (ref 0–0.2)
BILIRUB SERPL-MCNC: 1.32 MG/DL (ref 0.2–1)
BUN SERPL-MCNC: 8 MG/DL (ref 5–25)
CALCIUM SERPL-MCNC: 8 MG/DL (ref 8.4–10.2)
CHLORIDE SERPL-SCNC: 104 MMOL/L (ref 96–108)
CO2 SERPL-SCNC: 33 MMOL/L (ref 21–32)
CREAT SERPL-MCNC: 0.62 MG/DL (ref 0.6–1.3)
EOSINOPHIL # BLD AUTO: 0.06 THOUSAND/ÂΜL (ref 0–0.61)
EOSINOPHIL NFR BLD AUTO: 3 % (ref 0–6)
ERYTHROCYTE [DISTWIDTH] IN BLOOD BY AUTOMATED COUNT: 14.4 % (ref 11.6–15.1)
GFR SERPL CREATININE-BSD FRML MDRD: 102 ML/MIN/1.73SQ M
GLUCOSE SERPL-MCNC: 117 MG/DL (ref 65–140)
HCT VFR BLD AUTO: 32.1 % (ref 34.8–46.1)
HGB BLD-MCNC: 10.2 G/DL (ref 11.5–15.4)
IMM GRANULOCYTES # BLD AUTO: 0.01 THOUSAND/UL (ref 0–0.2)
IMM GRANULOCYTES NFR BLD AUTO: 0 % (ref 0–2)
LYMPHOCYTES # BLD AUTO: 0.65 THOUSANDS/ÂΜL (ref 0.6–4.47)
LYMPHOCYTES NFR BLD AUTO: 27 % (ref 14–44)
MAGNESIUM SERPL-MCNC: 2.2 MG/DL (ref 1.9–2.7)
MCH RBC QN AUTO: 32.6 PG (ref 26.8–34.3)
MCHC RBC AUTO-ENTMCNC: 31.8 G/DL (ref 31.4–37.4)
MCV RBC AUTO: 103 FL (ref 82–98)
MONOCYTES # BLD AUTO: 0.16 THOUSAND/ÂΜL (ref 0.17–1.22)
MONOCYTES NFR BLD AUTO: 7 % (ref 4–12)
NEUTROPHILS # BLD AUTO: 1.46 THOUSANDS/ÂΜL (ref 1.85–7.62)
NEUTS SEG NFR BLD AUTO: 61 % (ref 43–75)
NRBC BLD AUTO-RTO: 0 /100 WBCS
PLATELET # BLD AUTO: 62 THOUSANDS/UL (ref 149–390)
PMV BLD AUTO: 9.4 FL (ref 8.9–12.7)
POTASSIUM SERPL-SCNC: 3.4 MMOL/L (ref 3.5–5.3)
PROCALCITONIN SERPL-MCNC: <0.05 NG/ML
PROT SERPL-MCNC: 6.7 G/DL (ref 6.4–8.4)
RBC # BLD AUTO: 3.13 MILLION/UL (ref 3.81–5.12)
SODIUM SERPL-SCNC: 140 MMOL/L (ref 135–147)
WBC # BLD AUTO: 2.38 THOUSAND/UL (ref 4.31–10.16)

## 2024-05-27 PROCEDURE — 80048 BASIC METABOLIC PNL TOTAL CA: CPT | Performed by: INTERNAL MEDICINE

## 2024-05-27 PROCEDURE — 84145 PROCALCITONIN (PCT): CPT | Performed by: INTERNAL MEDICINE

## 2024-05-27 PROCEDURE — 94640 AIRWAY INHALATION TREATMENT: CPT

## 2024-05-27 PROCEDURE — 94664 DEMO&/EVAL PT USE INHALER: CPT

## 2024-05-27 PROCEDURE — 99232 SBSQ HOSP IP/OBS MODERATE 35: CPT | Performed by: FAMILY MEDICINE

## 2024-05-27 PROCEDURE — 80076 HEPATIC FUNCTION PANEL: CPT | Performed by: INTERNAL MEDICINE

## 2024-05-27 PROCEDURE — 94760 N-INVAS EAR/PLS OXIMETRY 1: CPT

## 2024-05-27 PROCEDURE — 83735 ASSAY OF MAGNESIUM: CPT | Performed by: INTERNAL MEDICINE

## 2024-05-27 PROCEDURE — 85025 COMPLETE CBC W/AUTO DIFF WBC: CPT | Performed by: INTERNAL MEDICINE

## 2024-05-27 RX ORDER — POTASSIUM CHLORIDE 20 MEQ/1
40 TABLET, EXTENDED RELEASE ORAL ONCE
Status: COMPLETED | OUTPATIENT
Start: 2024-05-27 | End: 2024-05-27

## 2024-05-27 RX ORDER — LIDOCAINE 50 MG/G
1 PATCH TOPICAL DAILY
Status: DISCONTINUED | OUTPATIENT
Start: 2024-05-27 | End: 2024-06-02 | Stop reason: HOSPADM

## 2024-05-27 RX ADMIN — CEFTRIAXONE 1000 MG: 1 INJECTION, SOLUTION INTRAVENOUS at 16:21

## 2024-05-27 RX ADMIN — OXYCODONE HYDROCHLORIDE 10 MG: 10 TABLET ORAL at 12:07

## 2024-05-27 RX ADMIN — HEPARIN SODIUM 5000 UNITS: 5000 INJECTION, SOLUTION INTRAVENOUS; SUBCUTANEOUS at 13:37

## 2024-05-27 RX ADMIN — ACETAMINOPHEN 325MG 650 MG: 325 TABLET ORAL at 13:36

## 2024-05-27 RX ADMIN — HYDROXYCHLOROQUINE SULFATE 200 MG: 200 TABLET, FILM COATED ORAL at 17:32

## 2024-05-27 RX ADMIN — POTASSIUM CHLORIDE 40 MEQ: 1500 TABLET, EXTENDED RELEASE ORAL at 12:08

## 2024-05-27 RX ADMIN — BENZONATATE 100 MG: 100 CAPSULE ORAL at 13:36

## 2024-05-27 RX ADMIN — OXYCODONE HYDROCHLORIDE 10 MG: 10 TABLET ORAL at 17:42

## 2024-05-27 RX ADMIN — IPRATROPIUM BROMIDE AND ALBUTEROL SULFATE 3 ML: 2.5; .5 SOLUTION RESPIRATORY (INHALATION) at 10:07

## 2024-05-27 RX ADMIN — LIDOCAINE 5% 1 PATCH: 700 PATCH TOPICAL at 14:57

## 2024-05-27 RX ADMIN — FUROSEMIDE 40 MG: 10 INJECTION, SOLUTION INTRAMUSCULAR; INTRAVENOUS at 17:36

## 2024-05-27 RX ADMIN — HEPARIN SODIUM 5000 UNITS: 5000 INJECTION, SOLUTION INTRAVENOUS; SUBCUTANEOUS at 21:54

## 2024-05-27 RX ADMIN — HYDROXYCHLOROQUINE SULFATE 200 MG: 200 TABLET, FILM COATED ORAL at 09:11

## 2024-05-27 RX ADMIN — OXYCODONE HYDROCHLORIDE 10 MG: 10 TABLET ORAL at 21:53

## 2024-05-27 RX ADMIN — RIFAXIMIN 550 MG: 550 TABLET ORAL at 17:32

## 2024-05-27 RX ADMIN — HEPARIN SODIUM 5000 UNITS: 5000 INJECTION, SOLUTION INTRAVENOUS; SUBCUTANEOUS at 05:50

## 2024-05-27 RX ADMIN — AMITRIPTYLINE HYDROCHLORIDE 75 MG: 25 TABLET, FILM COATED ORAL at 21:53

## 2024-05-27 RX ADMIN — FUROSEMIDE 40 MG: 10 INJECTION, SOLUTION INTRAMUSCULAR; INTRAVENOUS at 09:14

## 2024-05-27 RX ADMIN — RIFAXIMIN 550 MG: 550 TABLET ORAL at 09:11

## 2024-05-27 RX ADMIN — LISINOPRIL 40 MG: 20 TABLET ORAL at 09:11

## 2024-05-27 RX ADMIN — OXYCODONE HYDROCHLORIDE 10 MG: 10 TABLET ORAL at 05:51

## 2024-05-27 RX ADMIN — ACETAMINOPHEN 325MG 650 MG: 325 TABLET ORAL at 19:44

## 2024-05-27 NOTE — ASSESSMENT & PLAN NOTE
Wt Readings from Last 3 Encounters:   05/27/24 133 kg (293 lb 12.8 oz)     Presented with shortness of breath with exertion and peripheral edema  CT scan revealed pulmonary edema  Will provide IV Lasix 40 mg bid  Will check echo to evaluate lv function  Consider cardiology consult

## 2024-05-27 NOTE — ASSESSMENT & PLAN NOTE
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 continued on Sandostatin Q 28 days. Overall tolerates treatment well. Continue current treatment    - Carcinoid tumor marker Chromogranin A level has been normal however 5 HIAA has increased. Finally insurance approved Gallium/ PET scan 8/2/2022 report stable disease.\"   -- per recent Heme Onc note from [place]  "

## 2024-05-27 NOTE — PLAN OF CARE
Problem: RESPIRATORY - ADULT  Goal: Achieves optimal ventilation and oxygenation  Description: INTERVENTIONS:  - Assess for changes in respiratory status  - Assess for changes in mentation and behavior  - Position to facilitate oxygenation and minimize respiratory effort  - Oxygen administered by appropriate delivery if ordered  - Initiate smoking cessation education as indicated  - Encourage broncho-pulmonary hygiene including cough, deep breathe, Incentive Spirometry  - Assess the need for suctioning and aspirate as needed  - Assess and instruct to report SOB or any respiratory difficulty  - Respiratory Therapy support as indicated  Outcome: Progressing     Problem: METABOLIC, FLUID AND ELECTROLYTES - ADULT  Goal: Electrolytes maintained within normal limits  Description: INTERVENTIONS:  - Monitor labs and assess patient for signs and symptoms of electrolyte imbalances  - Administer electrolyte replacement as ordered  - Monitor response to electrolyte replacements, including repeat lab results as appropriate  - Instruct patient on fluid and nutrition as appropriate  Outcome: Progressing  Goal: Fluid balance maintained  Description: INTERVENTIONS:  - Monitor labs   - Monitor I/O and WT  - Instruct patient on fluid and nutrition as appropriate  - Assess for signs & symptoms of volume excess or deficit  Outcome: Progressing     Problem: CARDIOVASCULAR - ADULT  Goal: Maintains optimal cardiac output and hemodynamic stability  Description: INTERVENTIONS:  - Monitor I/O, vital signs and rhythm  - Monitor for S/S and trends of decreased cardiac output  - Administer and titrate ordered vasoactive medications to optimize hemodynamic stability  - Assess quality of pulses, skin color and temperature  - Assess for signs of decreased coronary artery perfusion  - Instruct patient to report change in severity of symptoms  Outcome: Progressing

## 2024-05-27 NOTE — UTILIZATION REVIEW
"Initial Clinical Review    Admission: Date/Time/Statement:   Admission Orders (From admission, onward)       Ordered        05/26/24 1651  INPATIENT ADMISSION  Once                          Orders Placed This Encounter   Procedures    INPATIENT ADMISSION     Standing Status:   Standing     Number of Occurrences:   1     Order Specific Question:   Level of Care     Answer:   Med Surg [16]     Order Specific Question:   Estimated length of stay     Answer:   More than 2 Midnights     Order Specific Question:   Certification     Answer:   I certify that inpatient services are medically necessary for this patient for a duration of greater than two midnights. See H&P and MD Progress Notes for additional information about the patient's course of treatment.     ED Arrival Information       Expected   -    Arrival   5/26/2024 14:34    Acuity   Emergent              Means of arrival   Wheelchair    Escorted by   Spouse    Service   Hospitalist    Admission type   Emergency              Arrival complaint   SOB             Chief Complaint   Patient presents with    Shortness of Breath     Pt has increasing sob over the past few days.        Initial Presentation: 53 y.o. female to ED from home w/ PMHX sarcoidosis, carcinoid tumor of the duodenum, HYATT cirrhosis, CHF initially presented with shortness of breath. Patient reports shortness of breath for the past 3 to 4 days. Is worse with exertion. Also notes lower extremity swelling. CTA revealed \"Bilateral multi lobar consolidation with evidence of interstitial edema, and small bilateral pleural effusions. Findings may represent multifocal pneumonia (infectious or organizing pneumonia) with superimposed vascular congestion. Pulmonary hemorrhage\"Admitted IP status w/ neena pulm infiltrates . Plan for IV laisx , pulm consult . Acute on chronic CHF CT revealed pulm edema , check echo , consider cardiology consult . Hypokalemia 3.3 , mg 1.7 replete and monitor . HTN on lasix , cont " lisinopril and follow Bps.     Anticipated Length of Stay/Certification Statement:  Patient will be admitted on an Inpatient basis with an anticipated length of stay of  > 2 midnights.   Justification for Hospital Stay: Pulmonary consolidation     Date: 5/27   Day 2: c/o SOB and not feeling well . -1967 ml output last 24 hrs . + rales . Cont IV abx for neena infiltrates . Consider pulmonology consult for further evaluation due to pancytopenia and carcinoid tumor history. Echo ordered and pending .    ED Triage Vitals [05/26/24 1438]   Temperature Pulse Respirations Blood Pressure SpO2   98.1 °F (36.7 °C) 99 20 (!) 181/84 93 %      Temp Source Heart Rate Source Patient Position - Orthostatic VS BP Location FiO2 (%)   Temporal Monitor Lying Left arm --      Pain Score       5          Wt Readings from Last 1 Encounters:   05/27/24 133 kg (293 lb 12.8 oz)     Additional Vital Signs:   05/27/24 1009 -- -- 24 Abnormal  -- -- -- None (Room air) --   05/27/24 09:13:41 98.8 °F (37.1 °C) 91 22 138/79 99 94 % None (Room air) --   05/26/24 22:33:15 -- 98 -- 148/77 101 91 % -- --   05/26/24 2221 -- -- -- -- -- 98 % -- --   05/26/24 2206 -- -- -- -- -- 91 % -- --   05/26/24 2016 -- -- -- -- -- 93 % None (Room air) --   05/26/24 1823 -- 89 -- -- -- -- -- --   05/26/24 1804 -- -- -- -- -- 91 % None (Room air) --   05/26/24 17:57:50 98.4 °F (36.9 °C) -- 22 120/70 87 -- -- --   05/26/24 1630 -- 92 16 180/81 Abnormal  117 96 % None (Room air) Lying     Pertinent Labs/Diagnostic Test Results:   5/26 EKG nterpretation:     Interpretation: non-specific    Rate:     ECG rate:  100   Rhythm:     Rhythm: sinus rhythm    Ectopy:     Ectopy: none    QRS:     QRS axis:  Normal   CTA ED chest PE study   Final Result by Bright Jones MD (05/26 3295)      No pulmonary embolus.      Bilateral multi lobar consolidation with evidence of interstitial edema, and small bilateral pleural effusions. Findings may represent multifocal pneumonia  (infectious or organizing pneumonia) with superimposed vascular congestion. Pulmonary hemorrhage    would also be a consideration.      The study was marked in EPIC for immediate notification.            Workstation performed: [id number]           Results from last 7 days   Lab Units 05/26/24  1446   SARS-COV-2  Negative     Results from last 7 days   Lab Units 05/27/24  0600 05/26/24  1446   WBC Thousand/uL 2.38* 2.54*   HEMOGLOBIN g/dL 10.2* 10.7*   HEMATOCRIT % 32.1* 33.1*   PLATELETS Thousands/uL 62* 64*   TOTAL NEUT ABS Thousands/µL 1.46* 1.90         Results from last 7 days   Lab Units 05/27/24  0600 05/26/24  1446   SODIUM mmol/L 140 140   POTASSIUM mmol/L 3.4* 3.3*   CHLORIDE mmol/L 104 105   CO2 mmol/L 33* 29   ANION GAP mmol/L 3* 6   BUN mg/dL 8 6   CREATININE mg/dL 0.62 0.57*   EGFR ml/min/1.73sq m 102 105   CALCIUM mg/dL 8.0* 8.1*   MAGNESIUM mg/dL 2.2 1.7*     Results from last 7 days   Lab Units 05/27/24  0600 05/26/24  1446   AST U/L 33 38   ALT U/L 13 15   ALK PHOS U/L 175* 178*   TOTAL PROTEIN g/dL 6.7 7.0   ALBUMIN g/dL 3.0* 3.4*   TOTAL BILIRUBIN mg/dL 1.32* 1.48*   BILIRUBIN DIRECT mg/dL 0.40*  --      Results from last 7 days   Lab Units 05/27/24  0600 05/26/24  1446   GLUCOSE RANDOM mg/dL 117 122       Results from last 7 days   Lab Units 05/26/24  1932 05/26/24  1617 05/26/24  1446   HS TNI 0HR ng/L  --   --  17   HS TNI 2HR ng/L  --  17  --    HSTNI D2 ng/L  --  0  --    HS TNI 4HR ng/L 17  --   --    HSTNI D4 ng/L 0  --   --          Results from last 7 days   Lab Units 05/26/24  1446   PROTIME seconds 16.3*   INR  1.27*   PTT seconds 28         Results from last 7 days   Lab Units 05/27/24  0600 05/26/24  1446   PROCALCITONIN ng/ml <0.05 <0.05     Results from last 7 days   Lab Units 05/26/24  1446   LACTIC ACID mmol/L 1.8     Results from last 7 days   Lab Units 05/26/24  1446   BNP pg/mL 133*         Results from last 7 days   Lab Units 05/26/24  1446   LIPASE u/L 11     Results from last  7 days   Lab Units 05/26/24  1446   INFLUENZA A PCR  Negative   INFLUENZA B PCR  Negative   RSV PCR  Negative       Results from last 7 days   Lab Units 05/26/24  1634 05/26/24  1630   BLOOD CULTURE  Received in Microbiology Lab. Culture in Progress. Received in Microbiology Lab. Culture in Progress.     ED Treatment:   Medication Administration from 05/26/2024 1433 to 05/26/2024 1753         Date/Time Order Dose Route Action     05/26/2024 1528 EDT potassium chloride (Klor-Con M20) CR tablet 40 mEq 40 mEq Oral Given     05/26/2024 1635 EDT cefTRIAXone (ROCEPHIN) IVPB (premix in dextrose) 1,000 mg 50 mL 1,000 mg Intravenous New Bag     05/26/2024 1727 EDT azithromycin (ZITHROMAX) 500 mg in sodium chloride 0.9 % 250 mL IVPB 500 mg Intravenous New Bag     05/26/2024 1634 EDT furosemide (LASIX) injection 40 mg 40 mg Intravenous Given          Past Medical History:   Diagnosis Date    Carcinoid tumor     neuroendocrine    CHF (congestive heart failure) (HCC)     Cirrhosis of liver (HCC)      Present on Admission:   Other cirrhosis of liver (HCC)   History of malignant carcinoid tumor of small intestine   Class 3 severe obesity due to excess calories in adult (HCC)   Primary hypertension   Anxiety   Hypokalemia   Fibromyalgia   Iron deficiency anemia   Pancytopenia (HCC)      Admitting Diagnosis: CHF (congestive heart failure) (HCC) [I50.9]  SOB (shortness of breath) [R06.02]  Multifocal pneumonia [J18.9]  Age/Sex: 53 y.o. female  Admission Orders:  Scheduled Medications:  amitriptyline, 75 mg, Oral, HS  cefTRIAXone, 1,000 mg, Intravenous, Q24H  furosemide, 40 mg, Intravenous, BID  heparin (porcine), 5,000 Units, Subcutaneous, Q8H MIGUEL  hydroxychloroquine, 200 mg, Oral, BID With Meals  lisinopril, 40 mg, Oral, Daily  rifaximin, 550 mg, Oral, BID      Continuous IV Infusions:     PRN Meds:  acetaminophen, 650 mg, Oral, Q6H PRN  benzonatate, 100 mg, Oral, TID PRN  ipratropium-albuterol, 3 mL, Nebulization, Q6H  PRN  oxyCODONE, 10 mg, Oral, Q4H PRN    I&O   Up as del   Fld restriction       IP CONSULT TO NUTRITION SERVICES    Network Utilization Review Department  ATTENTION: Please call with any questions or concerns to [phone] and carefully listen to the prompts so that you are directed to the right person. All voicemails are confidential.   For Discharge needs, contact Care Management DC Support Team at [phone] opt. [phone]  Send all requests for admission clinical reviews, approved or denied determinations and any other requests to dedicated fax number below belonging to the [facility] where the patient is receiving treatment. List of dedicated fax numbers for the Facilities:  FACILITY NAME UR FAX NUMBER   ADMISSION DENIALS (Administrative/Medical Necessity) [phone]   DISCHARGE SUPPORT TEAM (NETWORK) [phone]   PARENT CHILD HEALTH (Maternity/NICU/Pediatrics) [phone]   [facility] [phone]   [facility] [phone]   [facility] [phone]   [facility] [phone]   [facility] [phone]   [facility] [phone]   [facility] [phone]   [facility] [phone]   [facility] [phone]   [facility] [phone]   [facility] [phone]   [facility] [phone]

## 2024-05-27 NOTE — PROGRESS NOTES
"[facility]  Progress Note  Name: Bailey Olsen I  MRN: [id number]  Unit/Bed#: -01 I Date of Admission: 5/26/2024   Date of Service: 5/27/2024 I Hospital Day: 1    Assessment & Plan   * Bilateral pulmonary infiltrates  Assessment & Plan  53-year-old female with history of cirrhosis, carcinoid tumor who initially presented with shortness of breath peripheral edema  CTA revealed \"Bilateral multi lobar consolidation with evidence of interstitial edema, and small bilateral pleural effusions. Findings may represent multifocal pneumonia (infectious or organizing pneumonia) with superimposed vascular congestion. Pulmonary hemorrhage\"  Currently afebrile, WBC count within normal limits  Pro-Ernesto within normal limits placed on ceftriaxone for now  Consolidations may be secondary to CHF  Will provide IV Lasix as outlined below  Consider pulmonology consult for further evaluation due to pancytopenia and carcinoid tumor history    Acute on chronic congestive heart failure (HCC)  Assessment & Plan  Wt Readings from Last 3 Encounters:   05/27/24 133 kg (293 lb 12.8 oz)     Presented with shortness of breath with exertion and peripheral edema  CT scan revealed pulmonary edema  Will provide IV Lasix 40 mg bid  Will check echo to evaluate lv function  Consider cardiology consult          Sarcoidosis  Assessment & Plan  History of sarcoidosis of the skin, appears stable    Pancytopenia (HCC)  Assessment & Plan  Chronic, suspected to be from splenomegaly and liver cirrhosis  Follows with reading Heme/Onc  Cell counts on admission appear stable from most recent outpatient labs in early May  trend    Iron deficiency anemia  Assessment & Plan  History gastric bypass  Follows with reading Heme/Onc  Currently Hgb around baseline (10.4 on outpatient labs early may)      Fibromyalgia  Assessment & Plan  Noted history of chronic diffuse pain  Has been referred to Rheumatology in past  Continue " "Plaquenil    Hypokalemia  Assessment & Plan  3.3 on admission  Replete, trend    Mg 1.7  Replete, trend    Anxiety  Assessment & Plan  Anxiety/Depression  Continue amitryptiline.stable    Primary hypertension  Assessment & Plan  Hypertensive on admission, Hx HTN in chart  On lasix  Continue lisinopril   Follow BPs    Class 3 severe obesity due to excess calories in adult (HCC)  Assessment & Plan  Dietary and lifestyle changes    History of malignant carcinoid tumor of small intestine  Assessment & Plan  \"- GI Carcinoid tumor, dx 2006 involved small bowel/ duodenum and portal lymph nodes, s/p debulking in 7/2008 could not fully resect and was recieving Sandostatin q4 weeks at [facility] and follow-up 24 hr urine 5-HIAA level and chromogranin A level as well as octreotide scan or Gallium/PET scan  - Transfer care to [facility] in 8/2018. continued on Sandostatin Q 28 days. Overall tolerates treatment well. Continue current treatment    - Carcinoid tumor marker Chromogranin A level has been normal however 5 HIAA has increased. Finally insurance approved Gallium/ PET scan 8/2/2022 report stable disease.\"   -- per recent Heme Onc note from [place]    Other cirrhosis of liver (HCC)  Assessment & Plan  Due to NAFLD  Hx HE  3/19/24 EGD with Grade 1 Varices  LFTs remain stable  -continue Xifaxin, lactulose, and lasix         VTE Pharmacologic Prophylaxis: VTE Score: 4 Moderate Risk (Score 3-4) - Pharmacological DVT Prophylaxis Ordered: heparin.    Mobility:   Basic Mobility Inpatient Raw Score: 17  JH-HLM Goal: 5: Stand one or more mins  JH-HLM Achieved: 6: Walk 10 steps or more  JH-HLM Goal achieved. Continue to encourage appropriate mobility.    Patient Centered Rounds: I performed bedside rounds with nursing staff today.   Discussions with Specialists or Other Care Team Provider: none    Education and Discussions with Family / Patient:     Total Time Spent on Date of Encounter in care of patient: 35 mins. This time was " spent on one or more of the following: performing physical exam; counseling and coordination of care; obtaining or reviewing history; documenting in the medical record; reviewing/ordering tests, medications or procedures; communicating with other healthcare professionals and discussing with patient's family/caregivers.    Current Length of Stay: 1 day(s)  Current Patient Status: Inpatient   Certification Statement: The patient will continue to require additional inpatient hospital stay due to sob  Discharge Plan: Anticipate discharge in 24-48 hrs to home.    Code Status: Level 1 - Full Code    Subjective:   Complaining of shortness of breath and not feeling well.  Denies any chest pain    Objective:     Vitals:   Temp (24hrs), Av.4 °F (36.9 °C), Min:98.1 °F (36.7 °C), Max:98.8 °F (37.1 °C)    Temp:  [98.1 °F (36.7 °C)-98.8 °F (37.1 °C)] 98.8 °F (37.1 °C)  HR:  [89-99] 91  Resp:  [16-24] 24  BP: (120-181)/(70-84) 138/79  SpO2:  [91 %-98 %] 94 %  Body mass index is 48.89 kg/m².     Input and Output Summary (last 24 hours):     Intake/Output Summary (Last 24 hours) at 2024 1057  Last data filed at 2024 1045  Gross per 24 hour   Intake 992 ml   Output 2959 ml   Net -1967 ml       Physical Exam:   Physical Exam  Vitals and nursing note reviewed.   Constitutional:       Appearance: She is ill-appearing.   HENT:      Head: Normocephalic and atraumatic.      Right Ear: External ear normal.      Left Ear: External ear normal.      Nose: Nose normal.      Mouth/Throat:      Pharynx: Oropharynx is clear.   Eyes:      Pupils: Pupils are equal, round, and reactive to light.   Cardiovascular:      Rate and Rhythm: Normal rate and regular rhythm.      Heart sounds: Normal heart sounds.   Pulmonary:      Effort: Pulmonary effort is normal.      Breath sounds: Rales present.   Abdominal:      General: Bowel sounds are normal.      Palpations: Abdomen is soft.      Tenderness: There is no abdominal tenderness.    Musculoskeletal:         General: Normal range of motion.      Cervical back: Normal range of motion and neck supple.   Skin:     General: Skin is warm and dry.      Capillary Refill: Capillary refill takes less than 2 seconds.   Neurological:      General: No focal deficit present.      Mental Status: She is alert and oriented to person, place, and time.   Psychiatric:         Mood and Affect: Mood normal.            Additional Data:     Labs:  Results from last 7 days   Lab Units 05/27/24  0600   WBC Thousand/uL 2.38*   HEMOGLOBIN g/dL 10.2*   HEMATOCRIT % 32.1*   PLATELETS Thousands/uL 62*   SEGS PCT % 61   LYMPHO PCT % 27   MONO PCT % 7   EOS PCT % 3     Results from last 7 days   Lab Units 05/27/24  0600   SODIUM mmol/L 140   POTASSIUM mmol/L 3.4*   CHLORIDE mmol/L 104   CO2 mmol/L 33*   BUN mg/dL 8   CREATININE mg/dL 0.62   ANION GAP mmol/L 3*   CALCIUM mg/dL 8.0*   ALBUMIN g/dL 3.0*   TOTAL BILIRUBIN mg/dL 1.32*   ALK PHOS U/L 175*   ALT U/L 13   AST U/L 33   GLUCOSE RANDOM mg/dL 117     Results from last 7 days   Lab Units 05/26/24  1446   INR  1.27*             Results from last 7 days   Lab Units 05/27/24  0600 05/26/24  1446   LACTIC ACID mmol/L  --  1.8   PROCALCITONIN ng/ml <0.05 <0.05       Lines/Drains:  Invasive Devices       Peripheral Intravenous Line  Duration             Peripheral IV 05/26/24 Right Antecubital <1 day              Drain  Duration             External Urinary Catheter <1 day                          Imaging: Reviewed radiology reports from this admission including: chest CT scan    Recent Cultures (last 7 days):   Results from last 7 days   Lab Units 05/26/24  1634 05/26/24  1630   BLOOD CULTURE  Received in Microbiology Lab. Culture in Progress. Received in Microbiology Lab. Culture in Progress.       Last 24 Hours Medication List:   Current Facility-Administered Medications   Medication Dose Route Frequency Provider Last Rate    acetaminophen  650 mg Oral Q6H PRN Angel Cordero  MD      amitriptyline  75 mg Oral HS Angel Cordero MD      benzonatate  100 mg Oral TID PRN Angel Cordero MD      cefTRIAXone  1,000 mg Intravenous Q24H Angel Cordero MD      furosemide  40 mg Intravenous BID Angel Cordero MD      heparin (porcine)  5,000 Units Subcutaneous Q8H [facility] Angel Cordero MD      hydroxychloroquine  200 mg Oral BID With Meals Angel Cordero MD      ipratropium-albuterol  3 mL Nebulization Q6H PRN Angel Cordero MD      lisinopril  40 mg Oral Daily Angel Cordero MD      oxyCODONE  10 mg Oral Q4H PRN Angel Cordero MD      potassium chloride  40 mEq Oral Once Idalia Stuart MD      rifaximin  550 mg Oral BID Angel Cordero MD          Today, Patient Was Seen By: Idalia Stuart MD    **Please Note: This note may have been constructed using a voice recognition system.**

## 2024-05-27 NOTE — PLAN OF CARE
Problem: PAIN - ADULT  Goal: Verbalizes/displays adequate comfort level or baseline comfort level  Description: Interventions:  - Encourage patient to monitor pain and request assistance  - Assess pain using appropriate pain scale  - Administer analgesics based on type and severity of pain and evaluate response  - Implement non-pharmacological measures as appropriate and evaluate response  - Consider cultural and social influences on pain and pain management  - Notify physician/advanced practitioner if interventions unsuccessful or patient reports new pain  Outcome: Progressing     Problem: INFECTION - ADULT  Goal: Absence or prevention of progression during hospitalization  Description: INTERVENTIONS:  - Assess and monitor for signs and symptoms of infection  - Monitor lab/diagnostic results  - Monitor all insertion sites, i.e. indwelling lines, tubes, and drains  - Monitor endotracheal if appropriate and nasal secretions for changes in amount and color  - [place] appropriate cooling/warming therapies per order  - Administer medications as ordered  - Instruct and encourage patient and family to use good hand hygiene technique  - Identify and instruct in appropriate isolation precautions for identified infection/condition  Outcome: Progressing  Goal: Absence of fever/infection during neutropenic period  Description: INTERVENTIONS:  - Monitor WBC    Outcome: Progressing     Problem: SAFETY ADULT  Goal: Patient will remain free of falls  Description: INTERVENTIONS:  - Educate patient/family on patient safety including physical limitations  - Instruct patient to call for assistance with activity   - Consult OT/PT to assist with strengthening/mobility   - Keep Call bell within reach  - Keep bed low and locked with side rails adjusted as appropriate  - Keep care items and personal belongings within reach  - Initiate and maintain comfort rounds  - Make Fall Risk Sign visible to staff  - Offer Toileting every 2 Hours,  in advance of need  - Initiate/Maintain bed alarm  - Obtain necessary fall risk management equipment:   - Apply yellow socks and bracelet for high fall risk patients  - Consider moving patient to room near nurses station  Outcome: Progressing  Goal: Maintain or return to baseline ADL function  Description: INTERVENTIONS:  -  Assess patient's ability to carry out ADLs; assess patient's baseline for ADL function and identify physical deficits which impact ability to perform ADLs (bathing, care of mouth/teeth, toileting, grooming, dressing, etc.)  - Assess/evaluate cause of self-care deficits   - Assess range of motion  - Assess patient's mobility; develop plan if impaired  - Assess patient's need for assistive devices and provide as appropriate  - Encourage maximum independence but intervene and supervise when necessary  - Involve family in performance of ADLs  - Assess for home care needs following discharge   - Consider OT consult to assist with ADL evaluation and planning for discharge  - Provide patient education as appropriate  Outcome: Progressing  Goal: Maintains/Returns to pre admission functional level  Description: INTERVENTIONS:  - Perform AM-PAC 6 Click Basic Mobility/ Daily Activity assessment daily.  - Set and communicate daily mobility goal to care team and patient/family/caregiver.   - Collaborate with rehabilitation services on mobility goals if consulted  - Perform Range of Motion 3 times a day.  - Reposition patient every 2 hours.  - Dangle patient 3 times a day  - Stand patient 3 times a day  - Ambulate patient 3 times a day  - Out of bed to chair 3 times a day   - Out of bed for meals 3 times a day  - Out of bed for toileting  - Record patient progress and toleration of activity level   Outcome: Progressing     Problem: DISCHARGE PLANNING  Goal: Discharge to home or other facility with appropriate resources  Description: INTERVENTIONS:  - Identify barriers to discharge w/patient and caregiver  -  Arrange for needed discharge resources and transportation as appropriate  - Identify discharge learning needs (meds, wound care, etc.)  - Arrange for interpretive services to assist at discharge as needed  - Refer to Case Management Department for coordinating discharge planning if the patient needs post-hospital services based on physician/advanced practitioner order or complex needs related to functional status, cognitive ability, or social support system  Outcome: Progressing     Problem: Knowledge Deficit  Goal: Patient/family/caregiver demonstrates understanding of disease process, treatment plan, medications, and discharge instructions  Description: Complete learning assessment and assess knowledge base.  Interventions:  - Provide teaching at level of understanding  - Provide teaching via preferred learning methods  Outcome: Progressing     Problem: RESPIRATORY - ADULT  Goal: Achieves optimal ventilation and oxygenation  Description: INTERVENTIONS:  - Assess for changes in respiratory status  - Assess for changes in mentation and behavior  - Position to facilitate oxygenation and minimize respiratory effort  - Oxygen administered by appropriate delivery if ordered  - Initiate smoking cessation education as indicated  - Encourage broncho-pulmonary hygiene including cough, deep breathe, Incentive Spirometry  - Assess the need for suctioning and aspirate as needed  - Assess and instruct to report SOB or any respiratory difficulty  - Respiratory Therapy support as indicated  Outcome: Progressing     Problem: METABOLIC, FLUID AND ELECTROLYTES - ADULT  Goal: Electrolytes maintained within normal limits  Description: INTERVENTIONS:  - Monitor labs and assess patient for signs and symptoms of electrolyte imbalances  - Administer electrolyte replacement as ordered  - Monitor response to electrolyte replacements, including repeat lab results as appropriate  - Instruct patient on fluid and nutrition as  appropriate  Outcome: Progressing  Goal: Fluid balance maintained  Description: INTERVENTIONS:  - Monitor labs   - Monitor I/O and WT  - Instruct patient on fluid and nutrition as appropriate  - Assess for signs & symptoms of volume excess or deficit  Outcome: Progressing  Goal: Glucose maintained within target range  Description: INTERVENTIONS:  - Monitor Blood Glucose as ordered  - Assess for signs and symptoms of hyperglycemia and hypoglycemia  - Administer ordered medications to maintain glucose within target range  - Assess nutritional intake and initiate nutrition service referral as needed  Outcome: Progressing     Problem: Nutrition/Hydration-ADULT  Goal: Nutrient/Hydration intake appropriate for improving, restoring or maintaining nutritional needs  Description: Monitor and assess patient's nutrition/hydration status for malnutrition. Collaborate with interdisciplinary team and initiate plan and interventions as ordered.  Monitor patient's weight and dietary intake as ordered or per policy. Utilize nutrition screening tool and intervene as necessary. Determine patient's food preferences and provide high-protein, high-caloric foods as appropriate.     INTERVENTIONS:  - Monitor oral intake, urinary output, labs, and treatment plans  - Assess nutrition and hydration status and recommend course of action  - Evaluate amount of meals eaten  - Assist patient with eating if necessary   - Allow adequate time for meals  - Recommend/ encourage appropriate diets, oral nutritional supplements, and vitamin/mineral supplements  - Order, calculate, and assess calorie counts as needed  - Recommend, monitor, and adjust tube feedings and TPN/PPN based on assessed needs  - Assess need for intravenous fluids  - Provide specific nutrition/hydration education as appropriate  - Include patient/family/caregiver in decisions related to nutrition  Outcome: Progressing     Problem: Prexisting or High Potential for Compromised Skin  Integrity  Goal: Skin integrity is maintained or improved  Description: INTERVENTIONS:  - Identify patients at risk for skin breakdown  - Assess and monitor skin integrity  - Assess and monitor nutrition and hydration status  - Monitor labs   - Assess for incontinence   - Turn and reposition patient  - Assist with mobility/ambulation  - Relieve pressure over bony prominences  - Avoid friction and shearing  - Provide appropriate hygiene as needed including keeping skin clean and dry  - Evaluate need for skin moisturizer/barrier cream  - Collaborate with interdisciplinary team   - Patient/family teaching  - Consider wound care consult   Outcome: Progressing

## 2024-05-27 NOTE — ASSESSMENT & PLAN NOTE
"53-year-old female with history of cirrhosis, carcinoid tumor who initially presented with shortness of breath peripheral edema  CTA revealed \"Bilateral multi lobar consolidation with evidence of interstitial edema, and small bilateral pleural effusions. Findings may represent multifocal pneumonia (infectious or organizing pneumonia) with superimposed vascular congestion. Pulmonary hemorrhage\"  Currently afebrile, WBC count within normal limits  Pro-Ernesto within normal limits placed on ceftriaxone for now  Consolidations may be secondary to CHF  Will provide IV Lasix as outlined below  Consider pulmonology consult for further evaluation due to pancytopenia and carcinoid tumor history  "

## 2024-05-27 NOTE — RESPIRATORY THERAPY NOTE
RT Protocol Note  Bailey Olsen 53 y.o. female MRN: [id number]  Unit/Bed#: -01 Encounter: [id number]    Assessment    Principal Problem:    Bilateral pulmonary infiltrates  Active Problems:    Other cirrhosis of liver (HCC)    History of malignant carcinoid tumor of small intestine    Class 3 severe obesity due to excess calories in adult (HCC)    Primary hypertension    Anxiety    Hypokalemia    Fibromyalgia    Iron deficiency anemia    Pancytopenia (HCC)    Sarcoidosis    Acute on chronic congestive heart failure (HCC)      Home Pulmonary Medications:  None       Past Medical History:   Diagnosis Date    Carcinoid tumor     neuroendocrine    CHF (congestive heart failure) (HCC)     Cirrhosis of liver (HCC)      Social History     Socioeconomic History    Marital status: /Civil Union     Spouse name: None    Number of children: None    Years of education: None    Highest education level: None   Occupational History    None   Tobacco Use    Smoking status: Never    Smokeless tobacco: Never   Vaping Use    Vaping status: Never Used   Substance and Sexual Activity    Alcohol use: Never    Drug use: Never    Sexual activity: None   Other Topics Concern    None   Social History Narrative    None     Social Determinants of Health     Financial Resource Strain: Medium Risk (1/4/2024)    Received from [facility]    Overall Financial Resource Strain (CARDIA)     Difficulty of Paying Living Expenses: Somewhat hard   Food Insecurity: No Food Insecurity (1/4/2024)    Received from [facility]    Hunger Vital Sign     Worried About Running Out of Food in the Last Year: Never true     Ran Out of Food in the Last Year: Never true   Transportation Needs: Unmet Transportation Needs (1/4/2024)    Received from [facility]    PRAPARE - Transportation     Lack of Transportation (Medical): No     Lack of Transportation (Non-Medical): Yes   Physical Activity: Not on file  "  Stress: Stress Concern Present (1/4/2024)    Received from [facility]    [place] of Occupational [employer] - Occupational Stress Questionnaire     Feeling of Stress : Very much   Social Connections: Unknown (1/4/2024)    Received from [facility]    Social Connection and Isolation Panel [NHANES]     Frequency of Communication with Friends and Family: Three times a week     Frequency of Social Gatherings with Friends and Family: Once a week     Attends [employer] Services: Patient declined     Active Member of Clubs or Organizations: Patient declined     Attends Club or Organization Meetings: Patient declined     Marital Status:    Intimate Partner Violence: Not At Risk (1/4/2024)    Received from [facility]    Humiliation, Afraid, Rape, and Kick questionnaire     Fear of Current or Ex-Partner: No     Emotionally Abused: No     Physically Abused: No     Sexually Abused: No   Housing Stability: Not on file       Subjective         Objective    Physical Exam:   Assessment Type: (P) Assess only  General Appearance: (P) Alert, Awake  Respiratory Pattern: (P) Normal  Chest Assessment: (P) Chest expansion symmetrical  Bilateral Breath Sounds: (P) Diminished, Crackles  Cough: (P) Non-productive  O2 Device: (P) RA    Vitals:  Blood pressure 120/70, pulse 89, temperature 98.4 °F (36.9 °C), resp. rate 22, height 5' 5\" (1.651 m), weight 133 kg (294 lb), SpO2 91%.          Imaging and other studies: I have personally reviewed pertinent reports.      O2 Device: (P) RA     Plan    Respiratory Plan: (P) No distress/Pulmonary history, Discontinue Protocol        Resp Comments: (P) PT admitted with bilateral pulmonary infiltrates. Pt came in with SOB none at time of assessment. Pt on RA does not use home O2 No hx of COPD or asthma does not take any home nebs or inhalers. Will d/c protocol.   "

## 2024-05-28 ENCOUNTER — APPOINTMENT (INPATIENT)
Dept: NON INVASIVE DIAGNOSTICS | Facility: HOSPITAL | Age: 54
DRG: 291 | End: 2024-05-28
Payer: COMMERCIAL

## 2024-05-28 LAB
ALBUMIN SERPL BCP-MCNC: 3.2 G/DL (ref 3.5–5)
ALP SERPL-CCNC: 171 U/L (ref 34–104)
ALT SERPL W P-5'-P-CCNC: 10 U/L (ref 7–52)
ANION GAP SERPL CALCULATED.3IONS-SCNC: 4 MMOL/L (ref 4–13)
AORTIC ROOT: 2.8 CM
APICAL FOUR CHAMBER EJECTION FRACTION: 68 %
AST SERPL W P-5'-P-CCNC: 27 U/L (ref 13–39)
ATRIAL RATE: 98 BPM
BILIRUB SERPL-MCNC: 1.28 MG/DL (ref 0.2–1)
BSA FOR ECHO PROCEDURE: 2.3 M2
BUN SERPL-MCNC: 12 MG/DL (ref 5–25)
CALCIUM ALBUM COR SERPL-MCNC: 8.6 MG/DL (ref 8.3–10.1)
CALCIUM SERPL-MCNC: 8 MG/DL (ref 8.4–10.2)
CHLORIDE SERPL-SCNC: 105 MMOL/L (ref 96–108)
CO2 SERPL-SCNC: 29 MMOL/L (ref 21–32)
CREAT SERPL-MCNC: 0.66 MG/DL (ref 0.6–1.3)
E WAVE DECELERATION TIME: 137 MS
E/A RATIO: 0.96
ERYTHROCYTE [DISTWIDTH] IN BLOOD BY AUTOMATED COUNT: 14.3 % (ref 11.6–15.1)
FRACTIONAL SHORTENING: 45 (ref 28–44)
GFR SERPL CREATININE-BSD FRML MDRD: 100 ML/MIN/1.73SQ M
GLUCOSE SERPL-MCNC: 117 MG/DL (ref 65–140)
HCT VFR BLD AUTO: 32.3 % (ref 34.8–46.1)
HGB BLD-MCNC: 10.3 G/DL (ref 11.5–15.4)
INTERVENTRICULAR SEPTUM IN DIASTOLE (PARASTERNAL SHORT AXIS VIEW): 1.2 CM
INTERVENTRICULAR SEPTUM: 1.2 CM (ref 0.6–1.1)
LAAS-AP2: 20.8 CM2
LAAS-AP4: 18.7 CM2
LEFT ATRIUM SIZE: 4.3 CM
LEFT ATRIUM VOLUME (MOD BIPLANE): 59 ML
LEFT ATRIUM VOLUME INDEX (MOD BIPLANE): 25.7 ML/M2
LEFT INTERNAL DIMENSION IN SYSTOLE: 2.8 CM (ref 2.1–4)
LEFT VENTRICULAR INTERNAL DIMENSION IN DIASTOLE: 5.1 CM (ref 3.5–6)
LEFT VENTRICULAR POSTERIOR WALL IN END DIASTOLE: 1.2 CM
LEFT VENTRICULAR STROKE VOLUME: 92 ML
LVSV (TEICH): 92 ML
MCH RBC QN AUTO: 32.7 PG (ref 26.8–34.3)
MCHC RBC AUTO-ENTMCNC: 31.9 G/DL (ref 31.4–37.4)
MCV RBC AUTO: 103 FL (ref 82–98)
MV E'TISSUE VEL-SEP: 12 CM/S
MV PEAK A VEL: 1.37 M/S
MV PEAK E VEL: 132 CM/S
MV STENOSIS PRESSURE HALF TIME: 40 MS
MV VALVE AREA P 1/2 METHOD: 5.5
P AXIS: 60 DEGREES
PLATELET # BLD AUTO: 75 THOUSANDS/UL (ref 149–390)
PMV BLD AUTO: 9.8 FL (ref 8.9–12.7)
POTASSIUM SERPL-SCNC: 3.8 MMOL/L (ref 3.5–5.3)
PR INTERVAL: 184 MS
PROT SERPL-MCNC: 6.8 G/DL (ref 6.4–8.4)
PV MEAN GRADIENT: 8 MMHG
PV MEAN VELOCITY: 131 CM/S
PV PEAK GRADIENT: 15 MMHG
PV PEAK VELOCITY: 200 CM/S
PV VTI: 41.28 CM
QRS AXIS: 13 DEGREES
QRSD INTERVAL: 94 MS
QT INTERVAL: 376 MS
QTC INTERVAL: 480 MS
RBC # BLD AUTO: 3.15 MILLION/UL (ref 3.81–5.12)
RIGHT ATRIUM AREA SYSTOLE A4C: 20.3 CM2
RIGHT VENTRICLE ID DIMENSION: 3.1 CM
SL CV LEFT ATRIUM LENGTH A2C: 5.3 CM
SL CV PED ECHO LEFT VENTRICLE DIASTOLIC VOLUME (MOD BIPLANE) 2D: 123 ML
SL CV PED ECHO LEFT VENTRICLE SYSTOLIC VOLUME (MOD BIPLANE) 2D: 31 ML
SODIUM SERPL-SCNC: 138 MMOL/L (ref 135–147)
T WAVE AXIS: 97 DEGREES
TR MAX PG: 40 MMHG
TR PEAK VELOCITY: 3.2 M/S
TRICUSPID ANNULAR PLANE SYSTOLIC EXCURSION: 2.3 CM
TRICUSPID VALVE PEAK REGURGITATION VELOCITY: 3.17 M/S
VENTRICULAR RATE: 98 BPM
WBC # BLD AUTO: 2.76 THOUSAND/UL (ref 4.31–10.16)

## 2024-05-28 PROCEDURE — 80053 COMPREHEN METABOLIC PANEL: CPT | Performed by: FAMILY MEDICINE

## 2024-05-28 PROCEDURE — 85027 COMPLETE CBC AUTOMATED: CPT | Performed by: FAMILY MEDICINE

## 2024-05-28 PROCEDURE — 99232 SBSQ HOSP IP/OBS MODERATE 35: CPT | Performed by: FAMILY MEDICINE

## 2024-05-28 PROCEDURE — 93306 TTE W/DOPPLER COMPLETE: CPT

## 2024-05-28 PROCEDURE — 99223 1ST HOSP IP/OBS HIGH 75: CPT | Performed by: INTERNAL MEDICINE

## 2024-05-28 RX ADMIN — OXYCODONE HYDROCHLORIDE 10 MG: 10 TABLET ORAL at 05:13

## 2024-05-28 RX ADMIN — HEPARIN SODIUM 5000 UNITS: 5000 INJECTION, SOLUTION INTRAVENOUS; SUBCUTANEOUS at 05:14

## 2024-05-28 RX ADMIN — FUROSEMIDE 40 MG: 10 INJECTION, SOLUTION INTRAMUSCULAR; INTRAVENOUS at 18:37

## 2024-05-28 RX ADMIN — ACETAMINOPHEN 325MG 650 MG: 325 TABLET ORAL at 16:47

## 2024-05-28 RX ADMIN — RIFAXIMIN 550 MG: 550 TABLET ORAL at 18:36

## 2024-05-28 RX ADMIN — OXYCODONE HYDROCHLORIDE 10 MG: 10 TABLET ORAL at 15:26

## 2024-05-28 RX ADMIN — OXYCODONE HYDROCHLORIDE 10 MG: 10 TABLET ORAL at 20:57

## 2024-05-28 RX ADMIN — LISINOPRIL 40 MG: 20 TABLET ORAL at 08:22

## 2024-05-28 RX ADMIN — RIFAXIMIN 550 MG: 550 TABLET ORAL at 08:22

## 2024-05-28 RX ADMIN — OXYCODONE HYDROCHLORIDE 10 MG: 10 TABLET ORAL at 10:42

## 2024-05-28 RX ADMIN — AMITRIPTYLINE HYDROCHLORIDE 75 MG: 25 TABLET, FILM COATED ORAL at 20:57

## 2024-05-28 RX ADMIN — HYDROXYCHLOROQUINE SULFATE 200 MG: 200 TABLET, FILM COATED ORAL at 18:36

## 2024-05-28 RX ADMIN — LIDOCAINE 5% 1 PATCH: 700 PATCH TOPICAL at 15:26

## 2024-05-28 RX ADMIN — HYDROXYCHLOROQUINE SULFATE 200 MG: 200 TABLET, FILM COATED ORAL at 08:23

## 2024-05-28 RX ADMIN — FUROSEMIDE 40 MG: 10 INJECTION, SOLUTION INTRAMUSCULAR; INTRAVENOUS at 08:22

## 2024-05-28 RX ADMIN — CEFTRIAXONE 1000 MG: 1 INJECTION, SOLUTION INTRAVENOUS at 15:27

## 2024-05-28 NOTE — DISCHARGE INSTR - OTHER ORDERS
Skin care plans:  1-Calazime to sacrum, buttocks BID and PRN.  2-Hydraguard to bilateral heel BID and PRN.  3-Elevate heels to offload pressure.  4-Ehob cushion when out of bed.  5-Turn/reposition q2h or when medically stable for pressure re-distribution on skin.  6-Moisturize skin daily with skin nourishing cream.

## 2024-05-28 NOTE — WOUND OSTOMY CARE
Consult Note - Wound   Bailey Olsen 53 y.o. female MRN: [id number]  Unit/Bed#: -01 Encounter: [id number]      History and Present Illness:  Admitted 5/26/24 with Bilateral pulmonary infiltrates, PMH of cirrohis of the liver, history of malignant carcinoid tumor of small intestine Class 3 severe obesity, primary hypertension    Assessment Findings:   Seen for initial wound assessment.   1)Bilateral lower extremities intact with hyperpigmentation.  2)Perirectal redness, recommend Calalzime  3)MASD beneath breasts has resolved    No induration, fluctuance, odor, warmth/temperature differences, redness, or purulence noted to the above noted wounds and skin areas assessed. New dressings applied per orders listed below. Patient tolerated well- no s/s of non-verbal pain or discomfort observed during the encounter. Bedside nurse aware of plan of care. See flow sheets for more detailed assessment findings. Wound care will sign off    Skin care plans:  1-Calazime to sacrum, buttocks BID and PRN.  2-Hydraguard to bilateral heel BID and PRN.  3-Elevate heels to offload pressure.  4-Ehob cushion when out of bed.  5-Turn/reposition q2h or when medically stable for pressure re-distribution on skin.  6-Moisturize skin daily with skin nourishing cream.       Wound Care will sign off  Call or Tigertext with any questions      Mitra SMITHN RN CWON

## 2024-05-28 NOTE — ASSESSMENT & PLAN NOTE
Wt Readings from Last 3 Encounters:   05/28/24 129 kg (284 lb)     Presented with shortness of breath with exertion and peripheral edema  CT scan revealed pulmonary edema  Will provide IV Lasix 40 mg bid. diuresing quite well continue for another 24 to 48 hours  2 d echo normal lv function

## 2024-05-28 NOTE — ASSESSMENT & PLAN NOTE
"53-year-old female with history of cirrhosis, carcinoid tumor who initially presented with shortness of breath peripheral edema  CTA revealed \"Bilateral multi lobar consolidation with evidence of interstitial edema, and small bilateral pleural effusions. Findings may represent multifocal pneumonia (infectious or organizing pneumonia) with superimposed vascular congestion. Pulmonary hemorrhage\"  Currently afebrile, WBC count within normal limits  Pro-Ernesto within normal limits placed on ceftriaxone for now  Consolidations may be secondary to CHF  Will provide IV Lasix as outlined below   pulmonology consult for further evaluation due to pancytopenia and carcinoid tumor history  "

## 2024-05-28 NOTE — CASE MANAGEMENT
Case Management Assessment & Discharge Planning Note    Patient name Bailey Olsen  Location /-01 MRN [id number]  : 1970 Date 2024       Current Admission Date: 2024  Current Admission Diagnosis:Bilateral pulmonary infiltrates   Patient Active Problem List    Diagnosis Date Noted Date Diagnosed    Other cirrhosis of liver (HCC) 2024     History of malignant carcinoid tumor of small intestine 2024     Class 3 severe obesity due to excess calories in adult (HCC) 2024     Primary hypertension 2024     Anxiety 2024     Hypokalemia 2024     Fibromyalgia 2024     Iron deficiency anemia 2024     Pancytopenia (HCC) 2024     Sarcoidosis 2024     Acute on chronic congestive heart failure (HCC) 2024     Bilateral pulmonary infiltrates 2024       LOS (days): 2  Geometric Mean LOS (GMLOS) (days):   Days to GMLOS:     OBJECTIVE:    Risk of Unplanned Readmission Score: 16.33         Current admission status: Inpatient       Preferred Pharmacy:   [facility] [address] [place] [address] [address] [facility]  [address] [facility] [address]  Phone: [phone] Fax: [phone]    Primary Care Provider: Nayely Brown DO    Primary Insurance: [facility]  Secondary Insurance:     ASSESSMENT:  Active Health Care Proxies    There are no active Health Care Proxies on file.       Advance Directives  Does patient have a Health Care POA?: No  Was patient offered paperwork?: Yes (declined at this time)  Does patient currently have a Health Care decision maker?: Yes, please see Health Care Proxy section (spouse)  Does patient have Advance Directives?: No  Was patient offered paperwork?: Yes (patient declined at this time)         Readmission Root Cause  30 Day Readmission: No    Patient Information  Admitted from:: Home  Mental Status: Alert  During Assessment patient was accompanied by: Spouse  Assessment  information provided by:: Patient  Primary Caregiver: Self  Caregiver's Name:: Cristian Olsen - spouse  Caregiver's Relationship to Patient:: Significant Other  Caregiver's Telephone Number:: [phone]  Support Systems: Self, Children, Spouse/significant other, Family members  [facility] [facility]  What city do you live in?: [place]  Home entry access options. Select all that apply.: Stairs  Number of steps to enter home.: 1  Type of Current Residence: 2 story home  Upon entering residence, is there a bedroom on the main floor (no further steps)?: Yes  Upon entering residence, is there a bathroom on the main floor (no further steps)?: Yes  Living Arrangements: Lives w/ Spouse/significant other, Lives w/ Daughter  Is patient a ?: No    Activities of Daily Living Prior to Admission  Functional Status: Assistance  Completes ADLs independently?: No  Level of ADL dependence: Assistance  Ambulates independently?: Yes  Level of ambulatory dependence: Assistance (occasionally needs assistance when fatigued)  Does patient use assisted devices?: Yes  Assisted Devices (DME) used: Walker (walker provided by mother in law)  Does patient currently own DME?: Yes  What DME does the patient currently own?: Walker (provided by mother in law)  Does patient have a history of Outpatient Therapy (PT/OT)?: No  Does the patient have a history of Short-Term Rehab?: Yes ([facility] in [place])  Does patient have a history of HHC?: Yes (had home PT/ST services- could not remember name of service providers)  Does patient currently have HHC?: No         Patient Information Continued  Income Source: SSI/SSD (disability)  Does patient have prescription coverage?: Yes  Does patient receive dialysis treatments?: No  Does patient have a history of substance abuse?: No  Does patient have a history of Mental Health Diagnosis?: Yes (per patient she has Anxitey - takes Zoloft and effective)  Is patient receiving treatment for  mental health?: No. Patient declined treatment information.  Has patient received inpatient treatment related to mental health in the last 2 years?: No         Means of Transportation  Means of Transport to [facility]:: Family transport      Social Determinants of Health (SDOH)      Flowsheet Row Most Recent Value   Housing Stability    In the last 12 months, was there a time when you were not able to pay the mortgage or rent on time? N   At any time in the past 12 months, were you homeless or living in a shelter (including now)? N   Transportation Needs    In the past 12 months, has lack of transportation kept you from medical appointments or from getting medications? no   In the past 12 months, has lack of transportation kept you from meetings, work, or from getting things needed for daily living? No   Food Insecurity    Within the past 12 months, you worried that your food would run out before you got the money to buy more. Never true   Within the past 12 months, the food you bought just didn't last and you didn't have money to get more. Never true   Utilities    In the past 12 months has the electric, gas, oil, or water company threatened to shut off services in your home? No            DISCHARGE DETAILS:    Discharge planning discussed with:: patient  Freedom of Choice: Yes     CM contacted family/caregiver?: No- see comments (patient declined- discussed with patient)          CM met with pt to review role of CM and discuss any supports needed upon discharge. Currently no CM needs at this time.  Will continue to follow and address any discharge planning needs.                            Would you like to participate in our [facility] service program?  : No - Declined

## 2024-05-28 NOTE — PLAN OF CARE
Problem: PAIN - ADULT  Goal: Verbalizes/displays adequate comfort level or baseline comfort level  Description: Interventions:  - Encourage patient to monitor pain and request assistance  - Assess pain using appropriate pain scale  - Administer analgesics based on type and severity of pain and evaluate response  - Implement non-pharmacological measures as appropriate and evaluate response  - Consider cultural and social influences on pain and pain management  - Notify physician/advanced practitioner if interventions unsuccessful or patient reports new pain  Outcome: Progressing     Problem: RESPIRATORY - ADULT  Goal: Achieves optimal ventilation and oxygenation  Description: INTERVENTIONS:  - Assess for changes in respiratory status  - Assess for changes in mentation and behavior  - Position to facilitate oxygenation and minimize respiratory effort  - Oxygen administered by appropriate delivery if ordered  - Initiate smoking cessation education as indicated  - Encourage broncho-pulmonary hygiene including cough, deep breathe, Incentive Spirometry  - Assess the need for suctioning and aspirate as needed  - Assess and instruct to report SOB or any respiratory difficulty  - Respiratory Therapy support as indicated  Outcome: Progressing     Problem: Prexisting or High Potential for Compromised Skin Integrity  Goal: Skin integrity is maintained or improved  Description: INTERVENTIONS:  - Identify patients at risk for skin breakdown  - Assess and monitor skin integrity  - Assess and monitor nutrition and hydration status  - Monitor labs   - Assess for incontinence   - Turn and reposition patient  - Assist with mobility/ambulation  - Relieve pressure over bony prominences  - Avoid friction and shearing  - Provide appropriate hygiene as needed including keeping skin clean and dry  - Evaluate need for skin moisturizer/barrier cream  - Collaborate with interdisciplinary team   - Patient/family teaching  - Consider wound  care consult   Outcome: Progressing

## 2024-05-28 NOTE — PROGRESS NOTES
"[facility]  Progress Note  Name: Bailey Olsen I  MRN: [id number]  Unit/Bed#: -01 I Date of Admission: 5/26/2024   Date of Service: 5/28/2024 I Hospital Day: 2    Assessment & Plan   * Bilateral pulmonary infiltrates  Assessment & Plan  53-year-old female with history of cirrhosis, carcinoid tumor who initially presented with shortness of breath peripheral edema  CTA revealed \"Bilateral multi lobar consolidation with evidence of interstitial edema, and small bilateral pleural effusions. Findings may represent multifocal pneumonia (infectious or organizing pneumonia) with superimposed vascular congestion. Pulmonary hemorrhage\"  Currently afebrile, WBC count within normal limits  Pro-Ernesto within normal limits placed on ceftriaxone for now  Consolidations may be secondary to CHF  Will provide IV Lasix as outlined below   pulmonology consult for further evaluation due to pancytopenia and carcinoid tumor history    Acute on chronic congestive heart failure (HCC)  Assessment & Plan  Wt Readings from Last 3 Encounters:   05/28/24 129 kg (284 lb)     Presented with shortness of breath with exertion and peripheral edema  CT scan revealed pulmonary edema  Will provide IV Lasix 40 mg bid. diuresing quite well continue for another 24 to 48 hours  2 d echo normal lv function          Pancytopenia (HCC)  Assessment & Plan  Chronic, suspected to be from splenomegaly and liver cirrhosis  Follows with reading Heme/Onc  Cell counts on admission appear stable from most recent outpatient labs in early May  trend    Iron deficiency anemia  Assessment & Plan  History gastric bypass  Follows with reading Heme/Onc  Currently Hgb around baseline (10.4 on outpatient labs early may)      Fibromyalgia  Assessment & Plan  Noted history of chronic diffuse pain  Has been referred to Rheumatology in past  Continue Plaquenil    Anxiety  Assessment & Plan  Anxiety/Depression  Continue amitryptiline.stable    Class 3 " "severe obesity due to excess calories in adult (HCC)  Assessment & Plan  Dietary and lifestyle changes    History of malignant carcinoid tumor of small intestine  Assessment & Plan  \"- GI Carcinoid tumor, dx 2006 involved small bowel/ duodenum and portal lymph nodes, s/p debulking in 7/2008 could not fully resect and was recieving Sandostatin q4 weeks at [facility] and follow-up 24 hr urine 5-HIAA level and chromogranin A level as well as octreotide scan or Gallium/PET scan  - Transfer care to [facility] in 8/2018. continued on Sandostatin Q 28 days. Overall tolerates treatment well. Continue current treatment    - Carcinoid tumor marker Chromogranin A level has been normal however 5 HIAA has increased. Finally insurance approved Gallium/ PET scan 8/2/2022 report stable disease.\"   -- per recent Heme Onc note from [facility]    Other cirrhosis of liver (HCC)  Assessment & Plan  Due to NAFLD  Hx HE  3/19/24 EGD with Grade 1 Varices  LFTs remain stable  -continue Xifaxin, lactulose, and lasix               VTE Pharmacologic Prophylaxis: VTE Score: 4 Moderate Risk (Score 3-4) - Pharmacological DVT Prophylaxis Contraindicated. Sequential Compression Devices Ordered.    Mobility:   Basic Mobility Inpatient Raw Score: 22  JH-HLM Goal: 7: Walk 25 feet or more  JH-HLM Achieved: 8: Walk 250 feet ot more  JH-HLM Goal achieved. Continue to encourage appropriate mobility.    Patient Centered Rounds: I performed bedside rounds with nursing staff today.   Discussions with Specialists or Other Care Team Provider: jean lowry    Education and Discussions with Family / Patient:     Total Time Spent on Date of Encounter in care of patient: 35 mins. This time was spent on one or more of the following: performing physical exam; counseling and coordination of care; obtaining or reviewing history; documenting in the medical record; reviewing/ordering tests, medications or procedures; communicating with other healthcare professionals and " discussing with patient's family/caregivers.    Current Length of Stay: 2 day(s)  Current Patient Status: Inpatient   Certification Statement: The patient will continue to require additional inpatient hospital stay due to diastolic CHF  Discharge Plan: Anticipate discharge in 24-48 hrs to home.    Code Status: Level 1 - Full Code    Subjective:   States that she is starting to feel little bit better she is down 10 pounds and her breathing is slowly improving however still feels a lot of swelling and wants to diurese further    Objective:     Vitals:   Temp (24hrs), Av.3 °F (36.8 °C), Min:98.2 °F (36.8 °C), Max:98.4 °F (36.9 °C)    Temp:  [98.2 °F (36.8 °C)-98.4 °F (36.9 °C)] 98.2 °F (36.8 °C)  HR:  [90-98] 98  Resp:  [16-18] 18  BP: (126-143)/(67-82) 143/82  SpO2:  [90 %-96 %] 95 %  Body mass index is 47.26 kg/m².     Input and Output Summary (last 24 hours):     Intake/Output Summary (Last 24 hours) at 2024 1223  Last data filed at 2024 1220  Gross per 24 hour   Intake 1200 ml   Output 5250 ml   Net -4050 ml       Physical Exam:   Physical Exam  Vitals and nursing note reviewed.   Constitutional:       Appearance: Normal appearance.   HENT:      Head: Normocephalic and atraumatic.      Right Ear: External ear normal.      Left Ear: External ear normal.      Nose: Nose normal.      Mouth/Throat:      Pharynx: Oropharynx is clear.   Cardiovascular:      Rate and Rhythm: Normal rate and regular rhythm.      Heart sounds: Normal heart sounds.   Pulmonary:      Effort: Pulmonary effort is normal.      Breath sounds: Normal breath sounds.   Abdominal:      General: Bowel sounds are normal. There is distension.      Palpations: Abdomen is soft.      Tenderness: There is no abdominal tenderness.   Musculoskeletal:         General: Normal range of motion.      Cervical back: Normal range of motion and neck supple.      Right lower leg: Edema present.      Left lower leg: Edema present.   Skin:     General:  Skin is warm and dry.      Capillary Refill: Capillary refill takes less than 2 seconds.   Neurological:      General: No focal deficit present.      Mental Status: She is alert and oriented to person, place, and time.   Psychiatric:         Mood and Affect: Mood normal.            Additional Data:     Labs:  Results from last 7 days   Lab Units 05/28/24  0449 05/27/24  0600   WBC Thousand/uL 2.76* 2.38*   HEMOGLOBIN g/dL 10.3* 10.2*   HEMATOCRIT % 32.3* 32.1*   PLATELETS Thousands/uL 75* 62*   SEGS PCT %  --  61   LYMPHO PCT %  --  27   MONO PCT %  --  7   EOS PCT %  --  3     Results from last 7 days   Lab Units 05/28/24  0449   SODIUM mmol/L 138   POTASSIUM mmol/L 3.8   CHLORIDE mmol/L 105   CO2 mmol/L 29   BUN mg/dL 12   CREATININE mg/dL 0.66   ANION GAP mmol/L 4   CALCIUM mg/dL 8.0*   ALBUMIN g/dL 3.2*   TOTAL BILIRUBIN mg/dL 1.28*   ALK PHOS U/L 171*   ALT U/L 10   AST U/L 27   GLUCOSE RANDOM mg/dL 117     Results from last 7 days   Lab Units 05/26/24  1446   INR  1.27*             Results from last 7 days   Lab Units 05/27/24  0600 05/26/24  1446   LACTIC ACID mmol/L  --  1.8   PROCALCITONIN ng/ml <0.05 <0.05       Lines/Drains:  Invasive Devices       Peripheral Intravenous Line  Duration             Peripheral IV 05/26/24 Right Antecubital 1 day                          Imaging: Reviewed radiology reports from this admission including: chest CT scan and ultrasound(s)    Recent Cultures (last 7 days):   Results from last 7 days   Lab Units 05/26/24  1634 05/26/24  1630   BLOOD CULTURE  No Growth at 24 hrs. No Growth at 24 hrs.       Last 24 Hours Medication List:   Current Facility-Administered Medications   Medication Dose Route Frequency Provider Last Rate    acetaminophen  650 mg Oral Q6H PRN Angel Cordero MD      amitriptyline  75 mg Oral HS Angel Cordero MD      benzonatate  100 mg Oral TID PRN Angel Cordero MD      cefTRIAXone  1,000 mg Intravenous Q24H Angel Cordero MD 1,000 mg (05/27/24 1621)     furosemide  40 mg Intravenous BID Angel Cordero MD      heparin (porcine)  5,000 Units Subcutaneous Q8H [facility] Angel Cordero MD      hydroxychloroquine  200 mg Oral BID With Meals Angel Cordero MD      ipratropium-albuterol  3 mL Nebulization Q6H PRN Angel Cordero MD      lidocaine  1 patch Topical Daily Idalia Stuart MD      lisinopril  40 mg Oral Daily Angel Crodero MD      oxyCODONE  10 mg Oral Q4H PRN Angel Cordero MD      rifaximin  550 mg Oral BID Angel Cordero MD          Today, Patient Was Seen By: Idalia Stuart MD    **Please Note: This note may have been constructed using a voice recognition system.**

## 2024-05-28 NOTE — UTILIZATION REVIEW
Continued Stay Review    Date: 5/28/24                          Current Patient Class:  IP   Current Level of Care:  MS     HPI:53 y.o. female initially admitted on 5/26       Assessment/Plan:   Pt continues on IV ceftriaxone for bilat pulm infiltrates.WBC 2.76 today .  States that she is starting to feel little bit better , she is down 10 pounds and her breathing is slowly improving . Pt continues to feel  a lot of swelling .On exam, abdomen distended , BLE edema . I/O - 4050 ml last 24 hrs .Per nsg + MAXWELL, diminished coarse breath sounds , non productive cough. RA sat mid 90's. Spirometry 1000 ml with goal 1250 ml .   On  IV Lasix 40 mg bid: Pt diuresing  well , continue for another 24 to 48 hours . TTE  with normal LV function . BMP,Mag in am .     Vital Signs:   Date/Time Temp Pulse Resp BP MAP (mmHg) SpO2 Calculated FIO2 (%) - Nasal Cannula Nasal Cannula O2 Flow Rate (L/min) O2 Device   05/28/24 1042 -- -- -- -- -- 95 % -- -- None (Room air)   05/28/24 0945 -- 98 -- 143/82 -- -- -- -- --   05/28/24 0742 -- -- -- -- -- 96 % -- -- None (Room air)   05/28/24 07:17:12 98.2 °F (36.8 °C) 90 18 143/82 102 90 % 28 2 L/min Nasal cannula   05/27/24 22:04:40 -- 90 -- 133/68 90 93 % -- -- --   05/27/24 17:39:51 -- 92 -- 140/82 101 96 % -- -- --   05/27/24 15:11:39 98.4 °F (36.9 °C) 91 16 126/67 87 91 % -- --        Pertinent Labs/Diagnostic Results:    5/28 ECHO- TTE  Left Ventricle: Left ventricular cavity size is normal. There is borderline concentric hypertrophy. The left ventricular ejection fraction is 65-69 %. Systolic function is normal. Wall motion is normal. Diastolic function is normal.    Right Ventricle: Systolic function is normal.    There are no significant valvular abnormalities.  Results from last 7 days   Lab Units 05/26/24  1446   SARS-COV-2  Negative     Results from last 7 days   Lab Units 05/28/24  0449 05/27/24  0600 05/26/24  1446   WBC Thousand/uL 2.76* 2.38* 2.54*   HEMOGLOBIN g/dL 10.3* 10.2*  10.7*   HEMATOCRIT % 32.3* 32.1* 33.1*   PLATELETS Thousands/uL 75* 62* 64*   TOTAL NEUT ABS Thousands/µL  --  1.46* 1.90         Results from last 7 days   Lab Units 05/28/24 0449 05/27/24 0600 05/26/24  1446   SODIUM mmol/L 138 140 140   POTASSIUM mmol/L 3.8 3.4* 3.3*   CHLORIDE mmol/L 105 104 105   CO2 mmol/L 29 33* 29   ANION GAP mmol/L 4 3* 6   BUN mg/dL 12 8 6   CREATININE mg/dL 0.66 0.62 0.57*   EGFR ml/min/1.73sq m 100 102 105   CALCIUM mg/dL 8.0* 8.0* 8.1*   MAGNESIUM mg/dL  --  2.2 1.7*     Results from last 7 days   Lab Units 05/28/24 0449 05/27/24 0600 05/26/24  1446   AST U/L 27 33 38   ALT U/L 10 13 15   ALK PHOS U/L 171* 175* 178*   TOTAL PROTEIN g/dL 6.8 6.7 7.0   ALBUMIN g/dL 3.2* 3.0* 3.4*   TOTAL BILIRUBIN mg/dL 1.28* 1.32* 1.48*   BILIRUBIN DIRECT mg/dL  --  0.40*  --          Results from last 7 days   Lab Units 05/28/24 0449 05/27/24 0600 05/26/24  1446   GLUCOSE RANDOM mg/dL 117 117 122               Results from last 7 days   Lab Units 05/26/24  1932 05/26/24  1617 05/26/24  1446   HS TNI 0HR ng/L  --   --  17   HS TNI 2HR ng/L  --  17  --    HSTNI D2 ng/L  --  0  --    HS TNI 4HR ng/L 17  --   --    HSTNI D4 ng/L 0  --   --          Results from last 7 days   Lab Units 05/26/24  1446   PROTIME seconds 16.3*   INR  1.27*   PTT seconds 28         Results from last 7 days   Lab Units 05/27/24  0600 05/26/24  1446   PROCALCITONIN ng/ml <0.05 <0.05     Results from last 7 days   Lab Units 05/26/24  1446   LACTIC ACID mmol/L 1.8             Results from last 7 days   Lab Units 05/26/24  1446   BNP pg/mL 133*                     Results from last 7 days   Lab Units 05/26/24  1446   LIPASE u/L 11                     Results from last 7 days   Lab Units 05/26/24  1446   INFLUENZA A PCR  Negative   INFLUENZA B PCR  Negative   RSV PCR  Negative                             Results from last 7 days   Lab Units 05/26/24  1634 05/26/24  1630   BLOOD CULTURE  No Growth at 24 hrs. No Growth at 24 hrs.                    Medications:   Scheduled Medications:  amitriptyline, 75 mg, Oral, HS  cefTRIAXone, 1,000 mg, Intravenous, Q24H  furosemide, 40 mg, Intravenous, BID  hydroxychloroquine, 200 mg, Oral, BID With Meals  lidocaine, 1 patch, Topical, Daily  lisinopril, 40 mg, Oral, Daily  rifaximin, 550 mg, Oral, BID    heparin (porcine) subcutaneous injection 5,000 Units  Dose: 5,000 Units  Freq: Every 8 hours scheduled Route: SC  Start: 05/27/24 0600 End: 05/28/24 1224  Continuous IV Infusions:     PRN Meds:  acetaminophen, 650 mg, Oral, Q6H PRN x2 5/27  benzonatate, 100 mg, Oral, TID PRN x1 5/27   ipratropium-albuterol, 3 mL, Nebulization, Q6H PRN x1 5/27   oxyCODONE, 10 mg, Oral, Q4H PRN x4 5/27, x2 5/28         Discharge Plan: TBD    Network Utilization Review Department  ATTENTION: Please call with any questions or concerns to [phone] and carefully listen to the prompts so that you are directed to the right person. All voicemails are confidential.   For Discharge needs, contact Care Management DC Support Team at [phone] opt. 2  Send all requests for admission clinical reviews, approved or denied determinations and any other requests to dedicated fax number below belonging to the campus where the patient is receiving treatment. List of dedicated fax numbers for the Facilities:  FACILITY NAME UR FAX NUMBER   ADMISSION DENIALS (Administrative/Medical Necessity) [phone]   DISCHARGE SUPPORT TEAM (NETWORK) [phone]   PARENT CHILD HEALTH (Maternity/NICU/Pediatrics) [phone]   [facility] [phone]   [facility] [phone]   [facility] [phone]   [facility] [phone]   [facility] [phone]   [facility] [phone]   [facility] [phone]   [facility] [phone]    [facility] [phone]   [facility] [phone]   [facility] [phone]   [facility] [phone]

## 2024-05-28 NOTE — CONSULTS
Consultation - Pulmonary Medicine   Bailey Olsen 53 y.o. female MRN: [id number]  Unit/Bed#: -01 Encounter: [id number]      Assessment/Plan:    Abnormal CT chest  Acute on chronic diastolic CHF  Hx of GI carcinoid tumor  Cirrhosis  Hx of Sarcoidosis without lung involvement  Pancytopenia    Pulmonary recommendations:    Monitor SpO2 closely.  Maintain saturations greater than or equal to 88%.  No oxygen requirement at baseline and currently seen on room air.  Pulmonary toilet:  Increase activity as tolerated, out of bed as tolerated, cough and deep breathing exercises encourage, incentive spirometry q.1 hour  Diuresis per primary team   Continue empiric ABX wit CTX. Trend WBC, procalcitonin, temps. Follow culture data. Check sputum culture.   Would hold off on systemic steroids at this time.   Repeat CT Chest wo contrast in 8 weeks to confirm resolution.   If she clinically worsens could consider bronchoscopy  Follow up with outpatient heme/onc specialist.     History of Present Illness   Physician Requesting Consult: Idalia Stuart MD  Reason for Consult / Principal Problem: abnormal CT chest  Hx and PE limited by: n/a  Chief Complaint: shortness of breath   HPI: Bailey Olsen is a 53 y.o.  female who presented to [facility] with complaints of shortness of breath.  Patient has past medical is positive for sarcoidosis without lung involvement, history of GI carcinoid tumor of the duodenum, Larsen cirrhosis, CHF.  Patient reports 3 to 4 days of worsening shortness of breath, nonproductive cough, wheezing.  She denies lower extremity edema.  Denies fevers, chills, sick contacts.  Denies GI or urinary symptoms.  Patient came to the ED and was found to be pancytopenic CTA negative for PE study but did show multifocal groundglass opacities and small right greater than left pleural effusions.  Patient was started on ceftriaxone as well as diuresis per primary team.  Pulmonary was consulted to help  manage abnormal CT chest.    Patient does not follow with pulmonologist.  She has been told that she was diagnosed with sarcoidosis over 20 years ago following lymph node biopsy.  She recalls having an open lung biopsy but does not believe she ever had lung involvement.  Patient does not use inhalers or nebulizers.  She is not on chronic steroids currently.  She states she was at 1 time on a long taper of steroids but not so in many years.  Patient states that she was taking dapsone most recently but according to care everywhere oncology notes states that she has not been on this or methotrexate in over a year.  Patient is a lifelong non-smoker.  Denies occupational exposures to asbestos, silica.  She did have fume exposures in the past but nothing recent. Denies vaping.     Inpatient consult to Pulmonology  Consult performed by: Philip Li PA-C  Consult ordered by: Idalia Stuart MD          Review of Systems   All other systems reviewed and are negative.      Historical Information   Past Medical History:   Diagnosis Date    Carcinoid tumor     neuroendocrine    CHF (congestive heart failure) (HCC)     Cirrhosis of liver (HCC)      History reviewed. No pertinent surgical history.  Social History   Social History     Substance and Sexual Activity   Alcohol Use Never     Social History     Substance and Sexual Activity   Drug Use Never     Social History     Tobacco Use   Smoking Status Never   Smokeless Tobacco Never     E-Cigarette/Vaping    E-Cigarette Use Never User      E-Cigarette/Vaping Substances     Occupational History: noncontributory    Family History: History reviewed. No pertinent family history.    Meds/Allergies   all current active meds have been reviewed, pertinent pulmonary meds have been reviewed, and current meds:   Current Facility-Administered Medications   Medication Dose Route Frequency    acetaminophen (TYLENOL) tablet 650 mg  650 mg Oral Q6H PRN    amitriptyline (ELAVIL) tablet 75 mg   "75 mg Oral HS    benzonatate (TESSALON PERLES) capsule 100 mg  100 mg Oral TID PRN    cefTRIAXone (ROCEPHIN) IVPB (premix in dextrose) 1,000 mg 50 mL  1,000 mg Intravenous Q24H    furosemide (LASIX) injection 40 mg  40 mg Intravenous BID    hydroxychloroquine (PLAQUENIL) tablet 200 mg  200 mg Oral BID With Meals    ipratropium-albuterol (DUO-NEB) 0.5-2.5 mg/3 mL inhalation solution 3 mL  3 mL Nebulization Q6H PRN    lidocaine (LIDODERM) 5 % patch 1 patch  1 patch Topical Daily    lisinopril (ZESTRIL) tablet 40 mg  40 mg Oral Daily    oxyCODONE (ROXICODONE) immediate release tablet 10 mg  10 mg Oral Q4H PRN    rifaximin (XIFAXAN) tablet 550 mg  550 mg Oral BID       No Known Allergies    Objective   Vitals: Blood pressure 152/96, pulse 95, temperature 98.1 °F (36.7 °C), resp. rate 18, height 5' 5\" (1.651 m), weight 129 kg (284 lb), SpO2 92%.room air,Body mass index is 47.26 kg/m².    Intake/Output Summary (Last 24 hours) at 5/28/2024 1611  Last data filed at 5/28/2024 1300  Gross per 24 hour   Intake 1200 ml   Output 5250 ml   Net -4050 ml     Invasive Devices       Peripheral Intravenous Line  Duration             Peripheral IV 05/26/24 Right Antecubital 2 days                    Physical Exam  Vitals and nursing note reviewed.   Constitutional:       General: She is not in acute distress.     Appearance: Normal appearance.   HENT:      Head: Normocephalic and atraumatic.      Mouth/Throat:      Mouth: Mucous membranes are moist.      Pharynx: Oropharynx is clear.   Cardiovascular:      Rate and Rhythm: Normal rate and regular rhythm.      Heart sounds: No murmur heard.  Pulmonary:      Effort: Pulmonary effort is normal.      Breath sounds: No wheezing or rhonchi.   Musculoskeletal:      Cervical back: Normal range of motion.   Skin:     General: Skin is warm and dry.   Neurological:      General: No focal deficit present.      Mental Status: She is alert. Mental status is at baseline.   Psychiatric:         Mood " "and Affect: Mood normal.         Behavior: Behavior normal.         Lab Results: I have personally reviewed pertinent lab results., ABG: No results found for: \"PHART\", \"JOK2FXO\", \"PO2ART\", \"WBO4WLC\", \"O2MCQAUQ\", \"BEART\", \"SOURCE\", BNP: No results found for: \"BNP\", CBC:   Lab Results   Component Value Date    WBC 2.76 (L) 05/28/2024    HGB 10.3 (L) 05/28/2024    HCT 32.3 (L) 05/28/2024     (H) 05/28/2024    PLT 75 (L) 05/28/2024    RBC 3.15 (L) 05/28/2024    MCH 32.7 05/28/2024    MCHC 31.9 05/28/2024    RDW 14.3 05/28/2024    MPV 9.8 05/28/2024   , CMP:   Lab Results   Component Value Date    SODIUM 138 05/28/2024    K 3.8 05/28/2024     05/28/2024    CO2 29 05/28/2024    BUN 12 05/28/2024    CREATININE 0.66 05/28/2024    CALCIUM 8.0 (L) 05/28/2024    AST 27 05/28/2024    ALT 10 05/28/2024    ALKPHOS 171 (H) 05/28/2024    EGFR 100 05/28/2024   , PT/INR: No results found for: \"PT\", \"INR\", Troponin: No results found for: \"TROPONINI\"        Procalcitonin: WNL x2    Flu/COVID/RSV PCR: WNL    Culture Data: BC x2 NGTD at 24 hours    BNP: 133    Imaging Studies: I have personally reviewed pertinent reports.   and I have personally reviewed pertinent films in PACS     CTA chest PE study multifocal groundglass consolidations bilaterally most pronounced in upper lobes.  Underlying hazy groundglass densities in the lungs with interlobular septal thickening also noted.  Small bilateral pleural effusions.    EKG, Pathology, and Other Studies: I have personally reviewed pertinent reports.       Echo today  EF 65 to 69%.  Diastolic function normal.  Right ventricular size and systolic function normal.    Pulmonary Results (PFTs, PSG): I have personally reviewed pertinent reports.       [facility] PFT 7/11/2018  FEV1/VC ratio 81%  FEV1 76% predicted  FVC 70 worsened predicted  No change with bronchodilators  TLC 75% predicted  RV 64% predicted  DLCO corrected for patient's hemoglobin 57% predicted  Spirometry " "demonstrates restriction. Normal lung volumes.  Moderately impaired diffusion capacity.    Code Status: Level 1 - Full Code    Portions of the record may have been created with voice recognition software.  Occasional wrong word or \"sound a like\" substitutions may have occurred due to the inherent limitations of voice recognition software.  Read the chart carefully and recognize, using context, where substitutions have occurred.    "

## 2024-05-28 NOTE — PLAN OF CARE
Problem: PAIN - ADULT  Goal: Verbalizes/displays adequate comfort level or baseline comfort level  Description: Interventions:  - Encourage patient to monitor pain and request assistance  - Assess pain using appropriate pain scale  - Administer analgesics based on type and severity of pain and evaluate response  - Implement non-pharmacological measures as appropriate and evaluate response  - Consider cultural and social influences on pain and pain management  - Notify physician/advanced practitioner if interventions unsuccessful or patient reports new pain  Outcome: Progressing     Problem: INFECTION - ADULT  Goal: Absence or prevention of progression during hospitalization  Description: INTERVENTIONS:  - Assess and monitor for signs and symptoms of infection  - Monitor lab/diagnostic results  - Monitor all insertion sites, i.e. indwelling lines, tubes, and drains  - Monitor endotracheal if appropriate and nasal secretions for changes in amount and color  - [place] appropriate cooling/warming therapies per order  - Administer medications as ordered  - Instruct and encourage patient and family to use good hand hygiene technique  - Identify and instruct in appropriate isolation precautions for identified infection/condition  Outcome: Progressing  Goal: Absence of fever/infection during neutropenic period  Description: INTERVENTIONS:  - Monitor WBC    Outcome: Progressing     Problem: SAFETY ADULT  Goal: Patient will remain free of falls  Description: INTERVENTIONS:  - Educate patient/family on patient safety including physical limitations  - Instruct patient to call for assistance with activity   - Consult OT/PT to assist with strengthening/mobility   - Keep Call bell within reach  - Keep bed low and locked with side rails adjusted as appropriate  - Keep care items and personal belongings within reach  - Initiate and maintain comfort rounds  - Make Fall Risk Sign visible to staff  - Offer Toileting every 2 Hours,  in advance of need  - Apply yellow socks and bracelet for high fall risk patients  - Consider moving patient to room near nurses station  Outcome: Progressing  Goal: Maintain or return to baseline ADL function  Description: INTERVENTIONS:  -  Assess patient's ability to carry out ADLs; assess patient's baseline for ADL function and identify physical deficits which impact ability to perform ADLs (bathing, care of mouth/teeth, toileting, grooming, dressing, etc.)  - Assess/evaluate cause of self-care deficits   - Assess range of motion  - Assess patient's mobility; develop plan if impaired  - Assess patient's need for assistive devices and provide as appropriate  - Encourage maximum independence but intervene and supervise when necessary  - Involve family in performance of ADLs  - Assess for home care needs following discharge   - Consider OT consult to assist with ADL evaluation and planning for discharge  - Provide patient education as appropriate  Outcome: Progressing  Goal: Maintains/Returns to pre admission functional level  Description: INTERVENTIONS:  - Perform AM-PAC 6 Click Basic Mobility/ Daily Activity assessment daily.  - Set and communicate daily mobility goal to care team and patient/family/caregiver.   - Collaborate with rehabilitation services on mobility goals if consulted  - Perform Range of Motion 3 times a day.  - Reposition patient every 2 hours.  - Dangle patient 3 times a day  - Stand patient 3 times a day  - Ambulate patient 3 times a day  - Out of bed to chair 3 times a day   - Out of bed for meals 3 times a day  - Out of bed for toileting  - Record patient progress and toleration of activity level   Outcome: Progressing     Problem: DISCHARGE PLANNING  Goal: Discharge to home or other facility with appropriate resources  Description: INTERVENTIONS:  - Identify barriers to discharge w/patient and caregiver  - Arrange for needed discharge resources and transportation as appropriate  - Identify  discharge learning needs (meds, wound care, etc.)  - Arrange for interpretive services to assist at discharge as needed  - Refer to Case Management Department for coordinating discharge planning if the patient needs post-hospital services based on physician/advanced practitioner order or complex needs related to functional status, cognitive ability, or social support system  Outcome: Progressing     Problem: Knowledge Deficit  Goal: Patient/family/caregiver demonstrates understanding of disease process, treatment plan, medications, and discharge instructions  Description: Complete learning assessment and assess knowledge base.  Interventions:  - Provide teaching at level of understanding  - Provide teaching via preferred learning methods  Outcome: Progressing     Problem: RESPIRATORY - ADULT  Goal: Achieves optimal ventilation and oxygenation  Description: INTERVENTIONS:  - Assess for changes in respiratory status  - Assess for changes in mentation and behavior  - Position to facilitate oxygenation and minimize respiratory effort  - Oxygen administered by appropriate delivery if ordered  - Initiate smoking cessation education as indicated  - Encourage broncho-pulmonary hygiene including cough, deep breathe, Incentive Spirometry  - Assess the need for suctioning and aspirate as needed  - Assess and instruct to report SOB or any respiratory difficulty  - Respiratory Therapy support as indicated  Outcome: Progressing     Problem: METABOLIC, FLUID AND ELECTROLYTES - ADULT  Goal: Electrolytes maintained within normal limits  Description: INTERVENTIONS:  - Monitor labs and assess patient for signs and symptoms of electrolyte imbalances  - Administer electrolyte replacement as ordered  - Monitor response to electrolyte replacements, including repeat lab results as appropriate  - Instruct patient on fluid and nutrition as appropriate  Outcome: Progressing  Goal: Fluid balance maintained  Description: INTERVENTIONS:  -  Monitor labs   - Monitor I/O and WT  - Instruct patient on fluid and nutrition as appropriate  - Assess for signs & symptoms of volume excess or deficit  Outcome: Progressing  Goal: Glucose maintained within target range  Description: INTERVENTIONS:  - Monitor Blood Glucose as ordered  - Assess for signs and symptoms of hyperglycemia and hypoglycemia  - Administer ordered medications to maintain glucose within target range  - Assess nutritional intake and initiate nutrition service referral as needed  Outcome: Progressing     Problem: Nutrition/Hydration-ADULT  Goal: Nutrient/Hydration intake appropriate for improving, restoring or maintaining nutritional needs  Description: Monitor and assess patient's nutrition/hydration status for malnutrition. Collaborate with interdisciplinary team and initiate plan and interventions as ordered.  Monitor patient's weight and dietary intake as ordered or per policy. Utilize nutrition screening tool and intervene as necessary. Determine patient's food preferences and provide high-protein, high-caloric foods as appropriate.     INTERVENTIONS:  - Monitor oral intake, urinary output, labs, and treatment plans  - Assess nutrition and hydration status and recommend course of action  - Evaluate amount of meals eaten  - Assist patient with eating if necessary   - Allow adequate time for meals  - Recommend/ encourage appropriate diets, oral nutritional supplements, and vitamin/mineral supplements  - Order, calculate, and assess calorie counts as needed  - Recommend, monitor, and adjust tube feedings and TPN/PPN based on assessed needs  - Assess need for intravenous fluids  - Provide specific nutrition/hydration education as appropriate  - Include patient/family/caregiver in decisions related to nutrition  Outcome: Progressing     Problem: Prexisting or High Potential for Compromised Skin Integrity  Goal: Skin integrity is maintained or improved  Description: INTERVENTIONS:  - Identify  patients at risk for skin breakdown  - Assess and monitor skin integrity  - Assess and monitor nutrition and hydration status  - Monitor labs   - Assess for incontinence   - Turn and reposition patient  - Assist with mobility/ambulation  - Relieve pressure over bony prominences  - Avoid friction and shearing  - Provide appropriate hygiene as needed including keeping skin clean and dry  - Evaluate need for skin moisturizer/barrier cream  - Collaborate with interdisciplinary team   - Patient/family teaching  - Consider wound care consult   Outcome: Progressing     Problem: CARDIOVASCULAR - ADULT  Goal: Maintains optimal cardiac output and hemodynamic stability  Description: INTERVENTIONS:  - Monitor I/O, vital signs and rhythm  - Monitor for S/S and trends of decreased cardiac output  - Administer and titrate ordered vasoactive medications to optimize hemodynamic stability  - Assess quality of pulses, skin color and temperature  - Assess for signs of decreased coronary artery perfusion  - Instruct patient to report change in severity of symptoms  Outcome: Progressing  Goal: Absence of cardiac dysrhythmias or at baseline rhythm  Description: INTERVENTIONS:  - Continuous cardiac monitoring, vital signs, obtain 12 lead EKG if ordered  - Administer antiarrhythmic and heart rate control medications as ordered  - Monitor electrolytes and administer replacement therapy as ordered  Outcome: Progressing

## 2024-05-28 NOTE — ASSESSMENT & PLAN NOTE
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 continued on Sandostatin Q 28 days. Overall tolerates treatment well. Continue current treatment    - Carcinoid tumor marker Chromogranin A level has been normal however 5 HIAA has increased. Finally insurance approved Gallium/ PET scan 8/2/2022 report stable disease.\"   -- per recent Heme Onc note from [facility]  "

## 2024-05-29 PROBLEM — I50.33 ACUTE ON CHRONIC DIASTOLIC (CONGESTIVE) HEART FAILURE (HCC): Status: ACTIVE | Noted: 2024-05-26

## 2024-05-29 LAB
ANION GAP SERPL CALCULATED.3IONS-SCNC: 6 MMOL/L (ref 4–13)
BUN SERPL-MCNC: 14 MG/DL (ref 5–25)
CALCIUM SERPL-MCNC: 8.2 MG/DL (ref 8.4–10.2)
CHLORIDE SERPL-SCNC: 104 MMOL/L (ref 96–108)
CO2 SERPL-SCNC: 25 MMOL/L (ref 21–32)
CREAT SERPL-MCNC: 0.78 MG/DL (ref 0.6–1.3)
GFR SERPL CREATININE-BSD FRML MDRD: 86 ML/MIN/1.73SQ M
GLUCOSE SERPL-MCNC: 115 MG/DL (ref 65–140)
MAGNESIUM SERPL-MCNC: 2.1 MG/DL (ref 1.9–2.7)
POTASSIUM SERPL-SCNC: 3.9 MMOL/L (ref 3.5–5.3)
SODIUM SERPL-SCNC: 135 MMOL/L (ref 135–147)

## 2024-05-29 PROCEDURE — 80048 BASIC METABOLIC PNL TOTAL CA: CPT | Performed by: FAMILY MEDICINE

## 2024-05-29 PROCEDURE — 83735 ASSAY OF MAGNESIUM: CPT | Performed by: FAMILY MEDICINE

## 2024-05-29 PROCEDURE — 87205 SMEAR GRAM STAIN: CPT | Performed by: PHYSICIAN ASSISTANT

## 2024-05-29 PROCEDURE — 87070 CULTURE OTHR SPECIMN AEROBIC: CPT | Performed by: PHYSICIAN ASSISTANT

## 2024-05-29 PROCEDURE — 87106 FUNGI IDENTIFICATION YEAST: CPT | Performed by: PHYSICIAN ASSISTANT

## 2024-05-29 PROCEDURE — 99232 SBSQ HOSP IP/OBS MODERATE 35: CPT | Performed by: FAMILY MEDICINE

## 2024-05-29 RX ORDER — SPIRONOLACTONE 25 MG/1
25 TABLET ORAL DAILY
Status: DISCONTINUED | OUTPATIENT
Start: 2024-05-30 | End: 2024-05-29

## 2024-05-29 RX ORDER — SPIRONOLACTONE 25 MG/1
25 TABLET ORAL DAILY
Status: DISCONTINUED | OUTPATIENT
Start: 2024-05-29 | End: 2024-06-01

## 2024-05-29 RX ORDER — FUROSEMIDE 10 MG/ML
60 INJECTION INTRAMUSCULAR; INTRAVENOUS 2 TIMES DAILY
Status: DISCONTINUED | OUTPATIENT
Start: 2024-05-29 | End: 2024-05-30

## 2024-05-29 RX ORDER — POLYETHYLENE GLYCOL 3350 17 G/17G
17 POWDER, FOR SOLUTION ORAL DAILY PRN
Status: DISCONTINUED | OUTPATIENT
Start: 2024-05-29 | End: 2024-06-02 | Stop reason: HOSPADM

## 2024-05-29 RX ORDER — FUROSEMIDE 10 MG/ML
60 INJECTION INTRAMUSCULAR; INTRAVENOUS 2 TIMES DAILY
Status: DISCONTINUED | OUTPATIENT
Start: 2024-05-29 | End: 2024-05-29

## 2024-05-29 RX ORDER — LACTULOSE 10 G/15ML
10 SOLUTION ORAL DAILY PRN
COMMUNITY

## 2024-05-29 RX ORDER — SPIRONOLACTONE 25 MG/1
25 TABLET ORAL DAILY
Status: DISCONTINUED | OUTPATIENT
Start: 2024-05-29 | End: 2024-05-29

## 2024-05-29 RX ORDER — LACTULOSE 10 G/15ML
10 SOLUTION ORAL 2 TIMES DAILY PRN
Status: DISCONTINUED | OUTPATIENT
Start: 2024-05-29 | End: 2024-06-02 | Stop reason: HOSPADM

## 2024-05-29 RX ADMIN — LIDOCAINE 5% 1 PATCH: 700 PATCH TOPICAL at 17:54

## 2024-05-29 RX ADMIN — AMITRIPTYLINE HYDROCHLORIDE 75 MG: 25 TABLET, FILM COATED ORAL at 22:05

## 2024-05-29 RX ADMIN — RIFAXIMIN 550 MG: 550 TABLET ORAL at 17:52

## 2024-05-29 RX ADMIN — LACTULOSE 10 G: 20 SOLUTION ORAL at 13:22

## 2024-05-29 RX ADMIN — FUROSEMIDE 60 MG: 10 INJECTION, SOLUTION INTRAMUSCULAR; INTRAVENOUS at 11:08

## 2024-05-29 RX ADMIN — OXYCODONE HYDROCHLORIDE 10 MG: 10 TABLET ORAL at 06:26

## 2024-05-29 RX ADMIN — OXYCODONE HYDROCHLORIDE 10 MG: 10 TABLET ORAL at 22:05

## 2024-05-29 RX ADMIN — HYDROXYCHLOROQUINE SULFATE 200 MG: 200 TABLET, FILM COATED ORAL at 10:06

## 2024-05-29 RX ADMIN — POLYETHYLENE GLYCOL 3350 17 G: 17 POWDER, FOR SOLUTION ORAL at 11:08

## 2024-05-29 RX ADMIN — HYDROXYCHLOROQUINE SULFATE 200 MG: 200 TABLET, FILM COATED ORAL at 17:52

## 2024-05-29 RX ADMIN — RIFAXIMIN 550 MG: 550 TABLET ORAL at 10:05

## 2024-05-29 RX ADMIN — FUROSEMIDE 60 MG: 10 INJECTION, SOLUTION INTRAMUSCULAR; INTRAVENOUS at 17:53

## 2024-05-29 RX ADMIN — OXYCODONE HYDROCHLORIDE 10 MG: 10 TABLET ORAL at 18:03

## 2024-05-29 RX ADMIN — SPIRONOLACTONE 25 MG: 25 TABLET ORAL at 11:08

## 2024-05-29 RX ADMIN — OXYCODONE HYDROCHLORIDE 10 MG: 10 TABLET ORAL at 12:53

## 2024-05-29 RX ADMIN — OXYCODONE HYDROCHLORIDE 10 MG: 10 TABLET ORAL at 02:21

## 2024-05-29 RX ADMIN — CEFTRIAXONE 1000 MG: 1 INJECTION, SOLUTION INTRAVENOUS at 17:54

## 2024-05-29 NOTE — PROGRESS NOTES
"[facility]  Progress Note  Name: Bailey Olsen I  MRN: [id number]  Unit/Bed#: -01 I Date of Admission: 5/26/2024   Date of Service: 5/29/2024 I Hospital Day: 3    Assessment & Plan   * Bilateral pulmonary infiltrates  Assessment & Plan  53-year-old female with history of cirrhosis, carcinoid tumor who initially presented with shortness of breath peripheral edema  CTA revealed \"Bilateral multi lobar consolidation with evidence of interstitial edema, and small bilateral pleural effusions. Findings may represent multifocal pneumonia (infectious or organizing pneumonia) with superimposed vascular congestion. Pulmonary hemorrhage\" . Neg for pulm embolism  Currently afebrile, WBC count within normal limits  Pro-Ernesto within normal limits placed on ceftriaxone for now.complete 5 days  Consolidations may be secondary to CHF  Will provide IV Lasix as outlined below   pulmonology consult for further evaluation due to pancytopenia and carcinoid tumor history.  Repeat CT scan of the chest in 8 weeks or earlier if she develops worsening respiratory symptoms. If she worsens she will most likely need bronchoscopy and bronchoalveolar lavage.     Acute on chronic diastolic (congestive) heart failure (HCC)  Assessment & Plan  Wt Readings from Last 3 Encounters:   05/29/24 127 kg (280 lb 9.6 oz)     Presented with shortness of breath with exertion and peripheral edema  CT scan revealed pulmonary edema  patient has lost 14 pounds since admission increase Lasix from 40 mg IV twice daily to 60 IV twice daily and also add low-dose Aldactone due to history of liver cirrhosis  2 d echo normal lv function  Renal function stable so far.hold lisinopril to cont diuresis and avoid hypotension          Sarcoidosis  Assessment & Plan  History of sarcoidosis of the skin, appears stable    Pancytopenia (HCC)  Assessment & Plan  Chronic, suspected to be from splenomegaly and liver cirrhosis  Follows with [facility] " "H[facility]/Onc  Cell counts on admission appear stable from most recent outpatient labs in early May  trend    Iron deficiency anemia  Assessment & Plan  History gastric bypass  Follows with [facility] Heme/Onc  Currently Hgb around baseline (10.4 on outpatient labs early may)      Fibromyalgia  Assessment & Plan  Noted history of chronic diffuse pain  Has been referred to Rheumatology in past  Continue Plaquenil    Anxiety  Assessment & Plan  Anxiety/Depression  Continue amitryptiline.stable    Primary hypertension  Assessment & Plan  Hypertensive on admission, Hx HTN in chart  On lasix  hold lisinopril   Follow BPs    Class 3 severe obesity due to excess calories in adult (HCC)  Assessment & Plan  Dietary and lifestyle changes    History of malignant carcinoid tumor of small intestine  Assessment & Plan  \"- GI Carcinoid tumor, 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 continued on Sandostatin Q 28 days. Overall tolerates treatment well. Continue current treatment    - Carcinoid tumor marker Chromogranin A level has been normal however 5 HIAA has increased. 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 Sequential Compression Devices Ordered.    Mobility:   Basic Mobility Inpatient Raw Score: 22  JH-HLM Goal: 7: Walk 25 feet or more  JH-HLM Achieved: 6: Walk 10 steps or more  JH-HLM " Goal achieved. Continue to encourage appropriate mobility.    Patient Centered Rounds: I performed bedside rounds with nursing staff today.   Discussions with Specialists or Other Care Team Provider: jean lowry yesterday    Education and Discussions with Family / Patient: will update    Total Time Spent on Date of Encounter in care of patient: 35 mins. This time was spent on one or more of the following: performing physical exam; counseling and coordination of care; obtaining or reviewing history; documenting in the medical record; reviewing/ordering tests, medications or procedures; communicating with other healthcare professionals and discussing with patient's family/caregivers.    Current Length of Stay: 3 day(s)  Current Patient Status: Inpatient   Certification Statement: The patient will continue to require additional inpatient hospital stay due to chf  Discharge Plan: Anticipate discharge in 24-48 hrs to home.    Code Status: Level 1 - Full Code    Subjective:   Patient denies any chest pain feels fatigued and winded she states that walking around the hallway got her tired yesterday.  She is urinating well . no bowel movement in 3 days    Objective:     Vitals:   Temp (24hrs), Av.1 °F (36.7 °C), Min:97.9 °F (36.6 °C), Max:98.4 °F (36.9 °C)    Temp:  [97.9 °F (36.6 °C)-98.4 °F (36.9 °C)] 97.9 °F (36.6 °C)  HR:  [82-99] 82  Resp:  [18] 18  BP: (104-152)/(58-96) 104/58  SpO2:  [85 %-96 %] 96 %  Body mass index is 46.69 kg/m².     Input and Output Summary (last 24 hours):     Intake/Output Summary (Last 24 hours) at 2024 1004  Last data filed at 2024 0912  Gross per 24 hour   Intake 780 ml   Output 3550 ml   Net -2770 ml       Physical Exam:   Physical Exam  Vitals and nursing note reviewed.   Constitutional:       Appearance: Normal appearance.   HENT:      Head: Normocephalic and atraumatic.      Right Ear: External ear normal.      Left Ear: External ear normal.      Nose: Nose normal.       Mouth/Throat:      Pharynx: Oropharynx is clear.   Eyes:      Pupils: Pupils are equal, round, and reactive to light.   Cardiovascular:      Rate and Rhythm: Normal rate and regular rhythm.      Heart sounds: Normal heart sounds.   Pulmonary:      Effort: Pulmonary effort is normal.      Breath sounds: Rales present.   Abdominal:      General: Bowel sounds are normal.      Palpations: Abdomen is soft.      Tenderness: There is no abdominal tenderness.   Musculoskeletal:         General: Normal range of motion.      Cervical back: Normal range of motion and neck supple.      Right lower leg: Edema present.      Left lower leg: Edema present.   Skin:     General: Skin is warm and dry.      Capillary Refill: Capillary refill takes less than 2 seconds.   Neurological:      General: No focal deficit present.      Mental Status: She is alert and oriented to person, place, and time.   Psychiatric:         Mood and Affect: Mood normal.            Additional Data:     Labs:  Results from last 7 days   Lab Units 05/28/24  0449 05/27/24  0600   WBC Thousand/uL 2.76* 2.38*   HEMOGLOBIN g/dL 10.3* 10.2*   HEMATOCRIT % 32.3* 32.1*   PLATELETS Thousands/uL 75* 62*   SEGS PCT %  --  61   LYMPHO PCT %  --  27   MONO PCT %  --  7   EOS PCT %  --  3     Results from last 7 days   Lab Units 05/29/24  0526 05/28/24  0449   SODIUM mmol/L 135 138   POTASSIUM mmol/L 3.9 3.8   CHLORIDE mmol/L 104 105   CO2 mmol/L 25 29   BUN mg/dL 14 12   CREATININE mg/dL 0.78 0.66   ANION GAP mmol/L 6 4   CALCIUM mg/dL 8.2* 8.0*   ALBUMIN g/dL  --  3.2*   TOTAL BILIRUBIN mg/dL  --  1.28*   ALK PHOS U/L  --  171*   ALT U/L  --  10   AST U/L  --  27   GLUCOSE RANDOM mg/dL 115 117     Results from last 7 days   Lab Units 05/26/24  1446   INR  1.27*             Results from last 7 days   Lab Units 05/27/24  0600 05/26/24  1446   LACTIC ACID mmol/L  --  1.8   PROCALCITONIN ng/ml <0.05 <0.05       Lines/Drains:  Invasive Devices       Peripheral Intravenous Line   Duration             Peripheral IV 05/26/24 Right Antecubital 2 days                          Imaging: Reviewed radiology reports from this admission including: chest CT scan    Recent Cultures (last 7 days):   Results from last 7 days   Lab Units 05/26/24  1634 05/26/24  1630   BLOOD CULTURE  No Growth at 48 hrs. No Growth at 48 hrs.       Last 24 Hours Medication List:   Current Facility-Administered Medications   Medication Dose Route Frequency Provider Last Rate    acetaminophen  650 mg Oral Q6H PRN Angel Cordero MD      amitriptyline  75 mg Oral HS Angel Cordero MD      benzonatate  100 mg Oral TID PRN Angel Cordero MD      cefTRIAXone  1,000 mg Intravenous Q24H Angel Cordero MD 1,000 mg (05/28/24 1527)    furosemide  60 mg Intravenous BID Idalia Stuart MD      hydroxychloroquine  200 mg Oral BID With Meals Angel Cordero MD      ipratropium-albuterol  3 mL Nebulization Q6H PRN Angel Cordero MD      lidocaine  1 patch Topical Daily Idalia Stuart MD      oxyCODONE  10 mg Oral Q4H PRN Angel Cordero MD      polyethylene glycol  17 g Oral Daily PRN Idalia Stuart MD      rifaximin  550 mg Oral BID Angel Cordero MD      spironolactone  25 mg Oral Daily Idalia Stuart MD          Today, Patient Was Seen By: Idalia Stuart MD    **Please Note: This note may have been constructed using a voice recognition system.**

## 2024-05-29 NOTE — PLAN OF CARE
Problem: PAIN - ADULT  Goal: Verbalizes/displays adequate comfort level or baseline comfort level  Description: Interventions:  - Encourage patient to monitor pain and request assistance  - Assess pain using appropriate pain scale  - Administer analgesics based on type and severity of pain and evaluate response  - Implement non-pharmacological measures as appropriate and evaluate response  - Consider cultural and social influences on pain and pain management  - Notify physician/advanced practitioner if interventions unsuccessful or patient reports new pain  Outcome: Progressing     Problem: INFECTION - ADULT  Goal: Absence or prevention of progression during hospitalization  Description: INTERVENTIONS:  - Assess and monitor for signs and symptoms of infection  - Monitor lab/diagnostic results  - Monitor all insertion sites, i.e. indwelling lines, tubes, and drains  - Monitor endotracheal if appropriate and nasal secretions for changes in amount and color  - [place] appropriate cooling/warming therapies per order  - Administer medications as ordered  - Instruct and encourage patient and family to use good hand hygiene technique  - Identify and instruct in appropriate isolation precautions for identified infection/condition  Outcome: Progressing  Goal: Absence of fever/infection during neutropenic period  Description: INTERVENTIONS:  - Monitor WBC    Outcome: Progressing     Problem: SAFETY ADULT  Goal: Patient will remain free of falls  Description: INTERVENTIONS:  - Educate patient/family on patient safety including physical limitations  - Instruct patient to call for assistance with activity   - Consult OT/PT to assist with strengthening/mobility   - Keep Call bell within reach  - Keep bed low and locked with side rails adjusted as appropriate  - Keep care items and personal belongings within reach  - Initiate and maintain comfort rounds  - Make Fall Risk Sign visible to staff  - Offer Toileting every 2 Hours,  in advance of need  - Initiate/Maintain bed alarm  - Obtain necessary fall risk management equipment  - Apply yellow socks and bracelet for high fall risk patients  - Consider moving patient to room near nurses station  Outcome: Progressing  Goal: Maintain or return to baseline ADL function  Description: INTERVENTIONS:  -  Assess patient's ability to carry out ADLs; assess patient's baseline for ADL function and identify physical deficits which impact ability to perform ADLs (bathing, care of mouth/teeth, toileting, grooming, dressing, etc.)  - Assess/evaluate cause of self-care deficits   - Assess range of motion  - Assess patient's mobility; develop plan if impaired  - Assess patient's need for assistive devices and provide as appropriate  - Encourage maximum independence but intervene and supervise when necessary  - Involve family in performance of ADLs  - Assess for home care needs following discharge   - Consider OT consult to assist with ADL evaluation and planning for discharge  - Provide patient education as appropriate  Outcome: Progressing  Goal: Maintains/Returns to pre admission functional level  Description: INTERVENTIONS:  - Perform AM-PAC 6 Click Basic Mobility/ Daily Activity assessment daily.  - Set and communicate daily mobility goal to care team and patient/family/caregiver.   - Collaborate with rehabilitation services on mobility goals if consulted  - Stand patient 3 times a day  - Ambulate patient 3 times a day  - Out of bed to chair 3 times a day   - Out of bed for meals 3 times a day  - Out of bed for toileting  - Record patient progress and toleration of activity level   Outcome: Progressing     Problem: DISCHARGE PLANNING  Goal: Discharge to home or other facility with appropriate resources  Description: INTERVENTIONS:  - Identify barriers to discharge w/patient and caregiver  - Arrange for needed discharge resources and transportation as appropriate  - Identify discharge learning needs  (meds, wound care, etc.)  - Arrange for interpretive services to assist at discharge as needed  - Refer to Case Management Department for coordinating discharge planning if the patient needs post-hospital services based on physician/advanced practitioner order or complex needs related to functional status, cognitive ability, or social support system  Outcome: Progressing     Problem: Knowledge Deficit  Goal: Patient/family/caregiver demonstrates understanding of disease process, treatment plan, medications, and discharge instructions  Description: Complete learning assessment and assess knowledge base.  Interventions:  - Provide teaching at level of understanding  - Provide teaching via preferred learning methods  Outcome: Progressing     Problem: RESPIRATORY - ADULT  Goal: Achieves optimal ventilation and oxygenation  Description: INTERVENTIONS:  - Assess for changes in respiratory status  - Assess for changes in mentation and behavior  - Position to facilitate oxygenation and minimize respiratory effort  - Oxygen administered by appropriate delivery if ordered  - Initiate smoking cessation education as indicated  - Encourage broncho-pulmonary hygiene including cough, deep breathe, Incentive Spirometry  - Assess the need for suctioning and aspirate as needed  - Assess and instruct to report SOB or any respiratory difficulty  - Respiratory Therapy support as indicated  Outcome: Progressing     Problem: METABOLIC, FLUID AND ELECTROLYTES - ADULT  Goal: Electrolytes maintained within normal limits  Description: INTERVENTIONS:  - Monitor labs and assess patient for signs and symptoms of electrolyte imbalances  - Administer electrolyte replacement as ordered  - Monitor response to electrolyte replacements, including repeat lab results as appropriate  - Instruct patient on fluid and nutrition as appropriate  Outcome: Progressing  Goal: Fluid balance maintained  Description: INTERVENTIONS:  - Monitor labs   - Monitor I/O  and WT  - Instruct patient on fluid and nutrition as appropriate  - Assess for signs & symptoms of volume excess or deficit  Outcome: Progressing  Goal: Glucose maintained within target range  Description: INTERVENTIONS:  - Monitor Blood Glucose as ordered  - Assess for signs and symptoms of hyperglycemia and hypoglycemia  - Administer ordered medications to maintain glucose within target range  - Assess nutritional intake and initiate nutrition service referral as needed  Outcome: Progressing     Problem: Nutrition/Hydration-ADULT  Goal: Nutrient/Hydration intake appropriate for improving, restoring or maintaining nutritional needs  Description: Monitor and assess patient's nutrition/hydration status for malnutrition. Collaborate with interdisciplinary team and initiate plan and interventions as ordered.  Monitor patient's weight and dietary intake as ordered or per policy. Utilize nutrition screening tool and intervene as necessary. Determine patient's food preferences and provide high-protein, high-caloric foods as appropriate.     INTERVENTIONS:  - Monitor oral intake, urinary output, labs, and treatment plans  - Assess nutrition and hydration status and recommend course of action  - Evaluate amount of meals eaten  - Assist patient with eating if necessary   - Allow adequate time for meals  - Recommend/ encourage appropriate diets, oral nutritional supplements, and vitamin/mineral supplements  - Order, calculate, and assess calorie counts as needed  - Recommend, monitor, and adjust tube feedings and TPN/PPN based on assessed needs  - Assess need for intravenous fluids  - Provide specific nutrition/hydration education as appropriate  - Include patient/family/caregiver in decisions related to nutrition  Outcome: Progressing     Problem: Prexisting or High Potential for Compromised Skin Integrity  Goal: Skin integrity is maintained or improved  Description: INTERVENTIONS:  - Identify patients at risk for skin  breakdown  - Assess and monitor skin integrity  - Assess and monitor nutrition and hydration status  - Monitor labs   - Assess for incontinence   - Turn and reposition patient  - Assist with mobility/ambulation  - Relieve pressure over bony prominences  - Avoid friction and shearing  - Provide appropriate hygiene as needed including keeping skin clean and dry  - Evaluate need for skin moisturizer/barrier cream  - Collaborate with interdisciplinary team   - Patient/family teaching  - Consider wound care consult   Outcome: Progressing     Problem: CARDIOVASCULAR - ADULT  Goal: Maintains optimal cardiac output and hemodynamic stability  Description: INTERVENTIONS:  - Monitor I/O, vital signs and rhythm  - Monitor for S/S and trends of decreased cardiac output  - Administer and titrate ordered vasoactive medications to optimize hemodynamic stability  - Assess quality of pulses, skin color and temperature  - Assess for signs of decreased coronary artery perfusion  - Instruct patient to report change in severity of symptoms  Outcome: Progressing  Goal: Absence of cardiac dysrhythmias or at baseline rhythm  Description: INTERVENTIONS:  - Continuous cardiac monitoring, vital signs, obtain 12 lead EKG if ordered  - Administer antiarrhythmic and heart rate control medications as ordered  - Monitor electrolytes and administer replacement therapy as ordered  Outcome: Progressing

## 2024-05-29 NOTE — PLAN OF CARE
Problem: PAIN - ADULT  Goal: Verbalizes/displays adequate comfort level or baseline comfort level  Description: Interventions:  - Encourage patient to monitor pain and request assistance  - Assess pain using appropriate pain scale  - Administer analgesics based on type and severity of pain and evaluate response  - Implement non-pharmacological measures as appropriate and evaluate response  - Consider cultural and social influences on pain and pain management  - Notify physician/advanced practitioner if interventions unsuccessful or patient reports new pain  Outcome: Progressing     Problem: INFECTION - ADULT  Goal: Absence or prevention of progression during hospitalization  Description: INTERVENTIONS:  - Assess and monitor for signs and symptoms of infection  - Monitor lab/diagnostic results  - Monitor all insertion sites, i.e. indwelling lines, tubes, and drains  - Monitor endotracheal if appropriate and nasal secretions for changes in amount and color  - [place] appropriate cooling/warming therapies per order  - Administer medications as ordered  - Instruct and encourage patient and family to use good hand hygiene technique  - Identify and instruct in appropriate isolation precautions for identified infection/condition  Outcome: Progressing  Goal: Absence of fever/infection during neutropenic period  Description: INTERVENTIONS:  - Monitor WBC    Outcome: Progressing     Problem: SAFETY ADULT  Goal: Patient will remain free of falls  Description: INTERVENTIONS:  - Educate patient/family on patient safety including physical limitations  - Instruct patient to call for assistance with activity   - Consult OT/PT to assist with strengthening/mobility   - Keep Call bell within reach  - Keep bed low and locked with side rails adjusted as appropriate  - Keep care items and personal belongings within reach  - Initiate and maintain comfort rounds  - Apply yellow socks and bracelet for high fall risk patients  - Consider  moving patient to room near nurses station  Outcome: Progressing  Goal: Maintain or return to baseline ADL function  Description: INTERVENTIONS:  -  Assess patient's ability to carry out ADLs; assess patient's baseline for ADL function and identify physical deficits which impact ability to perform ADLs (bathing, care of mouth/teeth, toileting, grooming, dressing, etc.)  - Assess/evaluate cause of self-care deficits   - Assess range of motion  - Assess patient's mobility; develop plan if impaired  - Assess patient's need for assistive devices and provide as appropriate  - Encourage maximum independence but intervene and supervise when necessary  - Involve family in performance of ADLs  - Assess for home care needs following discharge   - Consider OT consult to assist with ADL evaluation and planning for discharge  - Provide patient education as appropriate  Outcome: Progressing  Goal: Maintains/Returns to pre admission functional level  Description: INTERVENTIONS:  - Perform AM-PAC 6 Click Basic Mobility/ Daily Activity assessment daily.  - Set and communicate daily mobility goal to care team and patient/family/caregiver.   - Collaborate with rehabilitation services on mobility goals if consulted  - Perform Range of Motion 3 times a day.  - Reposition patient every 2 hours.  - Dangle patient 3 times a day  - Stand patient 3 times a day  - Ambulate patient 3 times a day  - Out of bed to chair 3 times a day   - Out of bed for meals 3 times a day  - Out of bed for toileting  - Record patient progress and toleration of activity level   Outcome: Progressing     Problem: DISCHARGE PLANNING  Goal: Discharge to home or other facility with appropriate resources  Description: INTERVENTIONS:  - Identify barriers to discharge w/patient and caregiver  - Arrange for needed discharge resources and transportation as appropriate  - Identify discharge learning needs (meds, wound care, etc.)  - Arrange for interpretive services to  assist at discharge as needed  - Refer to Case Management Department for coordinating discharge planning if the patient needs post-hospital services based on physician/advanced practitioner order or complex needs related to functional status, cognitive ability, or social support system  Outcome: Progressing     Problem: Knowledge Deficit  Goal: Patient/family/caregiver demonstrates understanding of disease process, treatment plan, medications, and discharge instructions  Description: Complete learning assessment and assess knowledge base.  Interventions:  - Provide teaching at level of understanding  - Provide teaching via preferred learning methods  Outcome: Progressing     Problem: RESPIRATORY - ADULT  Goal: Achieves optimal ventilation and oxygenation  Description: INTERVENTIONS:  - Assess for changes in respiratory status  - Assess for changes in mentation and behavior  - Position to facilitate oxygenation and minimize respiratory effort  - Oxygen administered by appropriate delivery if ordered  - Initiate smoking cessation education as indicated  - Encourage broncho-pulmonary hygiene including cough, deep breathe, Incentive Spirometry  - Assess the need for suctioning and aspirate as needed  - Assess and instruct to report SOB or any respiratory difficulty  - Respiratory Therapy support as indicated  Outcome: Progressing     Problem: METABOLIC, FLUID AND ELECTROLYTES - ADULT  Goal: Electrolytes maintained within normal limits  Description: INTERVENTIONS:  - Monitor labs and assess patient for signs and symptoms of electrolyte imbalances  - Administer electrolyte replacement as ordered  - Monitor response to electrolyte replacements, including repeat lab results as appropriate  - Instruct patient on fluid and nutrition as appropriate  Outcome: Progressing  Goal: Fluid balance maintained  Description: INTERVENTIONS:  - Monitor labs   - Monitor I/O and WT  - Instruct patient on fluid and nutrition as  appropriate  - Assess for signs & symptoms of volume excess or deficit  Outcome: Progressing  Goal: Glucose maintained within target range  Description: INTERVENTIONS:  - Monitor Blood Glucose as ordered  - Assess for signs and symptoms of hyperglycemia and hypoglycemia  - Administer ordered medications to maintain glucose within target range  - Assess nutritional intake and initiate nutrition service referral as needed  Outcome: Progressing     Problem: Nutrition/Hydration-ADULT  Goal: Nutrient/Hydration intake appropriate for improving, restoring or maintaining nutritional needs  Description: Monitor and assess patient's nutrition/hydration status for malnutrition. Collaborate with interdisciplinary team and initiate plan and interventions as ordered.  Monitor patient's weight and dietary intake as ordered or per policy. Utilize nutrition screening tool and intervene as necessary. Determine patient's food preferences and provide high-protein, high-caloric foods as appropriate.     INTERVENTIONS:  - Monitor oral intake, urinary output, labs, and treatment plans  - Assess nutrition and hydration status and recommend course of action  - Evaluate amount of meals eaten  - Assist patient with eating if necessary   - Allow adequate time for meals  - Recommend/ encourage appropriate diets, oral nutritional supplements, and vitamin/mineral supplements  - Order, calculate, and assess calorie counts as needed  - Recommend, monitor, and adjust tube feedings and TPN/PPN based on assessed needs  - Assess need for intravenous fluids  - Provide specific nutrition/hydration education as appropriate  - Include patient/family/caregiver in decisions related to nutrition  Outcome: Progressing     Problem: Prexisting or High Potential for Compromised Skin Integrity  Goal: Skin integrity is maintained or improved  Description: INTERVENTIONS:  - Identify patients at risk for skin breakdown  - Assess and monitor skin integrity  - Assess  and monitor nutrition and hydration status  - Monitor labs   - Assess for incontinence   - Turn and reposition patient  - Assist with mobility/ambulation  - Relieve pressure over bony prominences  - Avoid friction and shearing  - Provide appropriate hygiene as needed including keeping skin clean and dry  - Evaluate need for skin moisturizer/barrier cream  - Collaborate with interdisciplinary team   - Patient/family teaching  - Consider wound care consult   Outcome: Progressing     Problem: CARDIOVASCULAR - ADULT  Goal: Maintains optimal cardiac output and hemodynamic stability  Description: INTERVENTIONS:  - Monitor I/O, vital signs and rhythm  - Monitor for S/S and trends of decreased cardiac output  - Administer and titrate ordered vasoactive medications to optimize hemodynamic stability  - Assess quality of pulses, skin color and temperature  - Assess for signs of decreased coronary artery perfusion  - Instruct patient to report change in severity of symptoms  Outcome: Progressing  Goal: Absence of cardiac dysrhythmias or at baseline rhythm  Description: INTERVENTIONS:  - Continuous cardiac monitoring, vital signs, obtain 12 lead EKG if ordered  - Administer antiarrhythmic and heart rate control medications as ordered  - Monitor electrolytes and administer replacement therapy as ordered  Outcome: Progressing

## 2024-05-29 NOTE — UTILIZATION REVIEW
Continued Stay Review    Date: 5/29                          Current Patient Class: IP   Current Level of Care: MS    HPI:53 y.o. female initially admitted on  5/26    Assessment/Plan:     5/29 IM Note   feels fatigued and winded she states that walking around the hallway got her tired yesterday.  She is urinating well . no bowel movement in 3 days . + rales and BLE edema . -2770 ml output last 24 hrs . Cont IV lasix . Renal function stable , hold lisinopril , cont diuresis .     Vital Signs:   05/29/24 10:16:08 -- -- -- 109/64 79 -- -- -- --   05/29/24 0717 97.9 °F (36.6 °C) 82 18 104/58 73 96 % --         Pertinent Labs/Diagnostic Results:   5/28 Echo   Left Ventricle: Left ventricular cavity size is normal. There is borderline concentric hypertrophy. The left ventricular ejection fraction is 65-69 %. Systolic function is normal. Wall motion is normal. Diastolic function is normal.    Right Ventricle: Systolic function is normal.    There are no significant valvular abnormalities  Results from last 7 days   Lab Units 05/26/24  1446   SARS-COV-2  Negative     Results from last 7 days   Lab Units 05/28/24  0449 05/27/24  0600 05/26/24  1446   WBC Thousand/uL 2.76* 2.38* 2.54*   HEMOGLOBIN g/dL 10.3* 10.2* 10.7*   HEMATOCRIT % 32.3* 32.1* 33.1*   PLATELETS Thousands/uL 75* 62* 64*   TOTAL NEUT ABS Thousands/µL  --  1.46* 1.90         Results from last 7 days   Lab Units 05/29/24  0526 05/28/24  0449 05/27/24  0600 05/26/24  1446   SODIUM mmol/L 135 138 140 140   POTASSIUM mmol/L 3.9 3.8 3.4* 3.3*   CHLORIDE mmol/L 104 105 104 105   CO2 mmol/L 25 29 33* 29   ANION GAP mmol/L 6 4 3* 6   BUN mg/dL 14 12 8 6   CREATININE mg/dL 0.78 0.66 0.62 0.57*   EGFR ml/min/1.73sq m 86 100 102 105   CALCIUM mg/dL 8.2* 8.0* 8.0* 8.1*   MAGNESIUM mg/dL 2.1  --  2.2 1.7*     Results from last 7 days   Lab Units 05/28/24  0449 05/27/24  0600 05/26/24  1446   AST U/L 27 33 38   ALT U/L 10 13 15   ALK PHOS U/L 171* 175* 178*   TOTAL  PROTEIN g/dL 6.8 6.7 7.0   ALBUMIN g/dL 3.2* 3.0* 3.4*   TOTAL BILIRUBIN mg/dL 1.28* 1.32* 1.48*   BILIRUBIN DIRECT mg/dL  --  0.40*  --          Results from last 7 days   Lab Units 05/29/24  0526 05/28/24  0449 05/27/24  0600 05/26/24  1446   GLUCOSE RANDOM mg/dL 115 117 117 122     Results from last 7 days   Lab Units 05/26/24  1932 05/26/24  1617 05/26/24  1446   HS TNI 0HR ng/L  --   --  17   HS TNI 2HR ng/L  --  17  --    HSTNI D2 ng/L  --  0  --    HS TNI 4HR ng/L 17  --   --    HSTNI D4 ng/L 0  --   --      Results from last 7 days   Lab Units 05/26/24  1446   PROTIME seconds 16.3*   INR  1.27*   PTT seconds 28         Results from last 7 days   Lab Units 05/27/24  0600 05/26/24  1446   PROCALCITONIN ng/ml <0.05 <0.05     Results from last 7 days   Lab Units 05/26/24  1446   LACTIC ACID mmol/L 1.8       Results from last 7 days   Lab Units 05/26/24  1446   BNP pg/mL 133*       Results from last 7 days   Lab Units 05/26/24  1446   LIPASE u/L 11       Results from last 7 days   Lab Units 05/26/24  1446   INFLUENZA A PCR  Negative   INFLUENZA B PCR  Negative   RSV PCR  Negative       Results from last 7 days   Lab Units 05/26/24  1634 05/26/24  1630   BLOOD CULTURE  No Growth at 48 hrs. No Growth at 48 hrs.     Medications:   Scheduled Medications:  amitriptyline, 75 mg, Oral, HS  cefTRIAXone, 1,000 mg, Intravenous, Q24H  furosemide, 60 mg, Intravenous, BID  hydroxychloroquine, 200 mg, Oral, BID With Meals  lidocaine, 1 patch, Topical, Daily  rifaximin, 550 mg, Oral, BID  spironolactone, 25 mg, Oral, Daily      Continuous IV Infusions:     PRN Meds:  acetaminophen, 650 mg, Oral, Q6H PRN  benzonatate, 100 mg, Oral, TID PRN  ipratropium-albuterol, 3 mL, Nebulization, Q6H PRN  oxyCODONE, 10 mg, Oral, Q4H PRN  polyethylene glycol, 17 g, Oral, Daily PRN        Discharge Plan: TBD     Network Utilization Review Department  ATTENTION: Please call with any questions or concerns to [phone] and carefully listen to  the prompts so that you are directed to the right person. All voicemails are confidential.   For Discharge needs, contact Care Management DC Support Team at [phone] opt. 2  Send all requests for admission clinical reviews, approved or denied determinations and any other requests to dedicated fax number below belonging to the campus where the patient is receiving treatment. List of dedicated fax numbers for the Facilities:  FACILITY NAME UR FAX NUMBER   ADMISSION DENIALS (Administrative/Medical Necessity) [phone]   DISCHARGE SUPPORT TEAM (NETWORK) [phone]   PARENT CHILD HEALTH (Maternity/NICU/Pediatrics) [phone]   [facility] [phone]   [facility] [phone]   [facility] [phone]   [facility] [phone]   [facility] [phone]   [facility] [phone]   [facility] [phone]   [facility] [phone]   [facility] [phone]   [facility] [phone]   [facility] [phone]   [facility] [phone]

## 2024-05-29 NOTE — ASSESSMENT & PLAN NOTE
"53-year-old female with history of cirrhosis, carcinoid tumor who initially presented with shortness of breath peripheral edema  CTA revealed \"Bilateral multi lobar consolidation with evidence of interstitial edema, and small bilateral pleural effusions. Findings may represent multifocal pneumonia (infectious or organizing pneumonia) with superimposed vascular congestion. Pulmonary hemorrhage\" . Neg for pulm embolism  Currently afebrile, WBC count within normal limits  Pro-Ernesto within normal limits placed on ceftriaxone for now.complete 5 days  Consolidations may be secondary to CHF  Will provide IV Lasix as outlined below   pulmonology consult for further evaluation due to pancytopenia and carcinoid tumor history.  Repeat CT scan of the chest in 8 weeks or earlier if she develops worsening respiratory symptoms. If she worsens she will most likely need bronchoscopy and bronchoalveolar lavage.   "

## 2024-05-29 NOTE — ASSESSMENT & PLAN NOTE
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 continued on Sandostatin Q 28 days. Overall tolerates treatment well. Continue current treatment    - Carcinoid tumor marker Chromogranin A level has been normal however 5 HIAA has increased. Finally insurance approved Gallium/ PET scan 8/2/2022 report stable disease.\"   -- per recent Heme Onc note from [place]  "

## 2024-05-29 NOTE — ASSESSMENT & PLAN NOTE
Wt Readings from Last 3 Encounters:   05/29/24 127 kg (280 lb 9.6 oz)     Presented with shortness of breath with exertion and peripheral edema  CT scan revealed pulmonary edema  patient has lost 14 pounds since admission increase Lasix from 40 mg IV twice daily to 60 IV twice daily and also add low-dose Aldactone due to history of liver cirrhosis  2 d echo normal lv function  Renal function stable so far.hold lisinopril to cont diuresis and avoid hypotension

## 2024-05-30 ENCOUNTER — TELEPHONE (OUTPATIENT)
Dept: PULMONOLOGY | Facility: CLINIC | Age: 54
End: 2024-05-30

## 2024-05-30 ENCOUNTER — APPOINTMENT (INPATIENT)
Dept: NON INVASIVE DIAGNOSTICS | Facility: HOSPITAL | Age: 54
DRG: 291 | End: 2024-05-30
Payer: COMMERCIAL

## 2024-05-30 LAB
ANION GAP SERPL CALCULATED.3IONS-SCNC: 5 MMOL/L (ref 4–13)
BUN SERPL-MCNC: 13 MG/DL (ref 5–25)
CALCIUM SERPL-MCNC: 8.3 MG/DL (ref 8.4–10.2)
CHLORIDE SERPL-SCNC: 101 MMOL/L (ref 96–108)
CO2 SERPL-SCNC: 32 MMOL/L (ref 21–32)
CREAT SERPL-MCNC: 0.72 MG/DL (ref 0.6–1.3)
CRP SERPL QL: 51.4 MG/L
GFR SERPL CREATININE-BSD FRML MDRD: 95 ML/MIN/1.73SQ M
GLUCOSE SERPL-MCNC: 114 MG/DL (ref 65–140)
POTASSIUM SERPL-SCNC: 3.9 MMOL/L (ref 3.5–5.3)
SODIUM SERPL-SCNC: 138 MMOL/L (ref 135–147)
URATE SERPL-MCNC: 6.6 MG/DL (ref 2–7.5)

## 2024-05-30 PROCEDURE — 99232 SBSQ HOSP IP/OBS MODERATE 35: CPT | Performed by: FAMILY MEDICINE

## 2024-05-30 PROCEDURE — 80048 BASIC METABOLIC PNL TOTAL CA: CPT | Performed by: FAMILY MEDICINE

## 2024-05-30 PROCEDURE — 86140 C-REACTIVE PROTEIN: CPT | Performed by: FAMILY MEDICINE

## 2024-05-30 PROCEDURE — 84550 ASSAY OF BLOOD/URIC ACID: CPT | Performed by: FAMILY MEDICINE

## 2024-05-30 RX ORDER — FUROSEMIDE 10 MG/ML
80 INJECTION INTRAMUSCULAR; INTRAVENOUS 2 TIMES DAILY
Status: DISCONTINUED | OUTPATIENT
Start: 2024-05-30 | End: 2024-06-01

## 2024-05-30 RX ORDER — SERTRALINE HYDROCHLORIDE 100 MG/1
100 TABLET, FILM COATED ORAL DAILY
Status: DISCONTINUED | OUTPATIENT
Start: 2024-05-30 | End: 2024-06-02 | Stop reason: HOSPADM

## 2024-05-30 RX ORDER — DAPSONE 25 MG/1
50 TABLET ORAL DAILY
Status: DISCONTINUED | OUTPATIENT
Start: 2024-05-31 | End: 2024-06-02 | Stop reason: HOSPADM

## 2024-05-30 RX ORDER — PREDNISONE 20 MG/1
40 TABLET ORAL DAILY
Status: COMPLETED | OUTPATIENT
Start: 2024-05-30 | End: 2024-06-01

## 2024-05-30 RX ORDER — GABAPENTIN 100 MG/1
100 CAPSULE ORAL 2 TIMES DAILY
Status: DISCONTINUED | OUTPATIENT
Start: 2024-05-30 | End: 2024-06-02 | Stop reason: HOSPADM

## 2024-05-30 RX ORDER — BENZOCAINE/MENTHOL 6 MG-10 MG
LOZENGE MUCOUS MEMBRANE 4 TIMES DAILY PRN
Status: DISCONTINUED | OUTPATIENT
Start: 2024-05-30 | End: 2024-06-02 | Stop reason: HOSPADM

## 2024-05-30 RX ADMIN — OXYCODONE HYDROCHLORIDE 10 MG: 10 TABLET ORAL at 21:12

## 2024-05-30 RX ADMIN — HYDROXYCHLOROQUINE SULFATE 200 MG: 200 TABLET, FILM COATED ORAL at 18:23

## 2024-05-30 RX ADMIN — PREDNISONE 40 MG: 20 TABLET ORAL at 13:56

## 2024-05-30 RX ADMIN — RIFAXIMIN 550 MG: 550 TABLET ORAL at 09:30

## 2024-05-30 RX ADMIN — HYDROXYCHLOROQUINE SULFATE 200 MG: 200 TABLET, FILM COATED ORAL at 09:30

## 2024-05-30 RX ADMIN — GABAPENTIN 100 MG: 100 CAPSULE ORAL at 18:23

## 2024-05-30 RX ADMIN — RIFAXIMIN 550 MG: 550 TABLET ORAL at 18:23

## 2024-05-30 RX ADMIN — HYDROCORTISONE 1 APPLICATION: 1 CREAM TOPICAL at 21:14

## 2024-05-30 RX ADMIN — OXYCODONE HYDROCHLORIDE 10 MG: 10 TABLET ORAL at 13:58

## 2024-05-30 RX ADMIN — GABAPENTIN 100 MG: 100 CAPSULE ORAL at 09:30

## 2024-05-30 RX ADMIN — CEFTRIAXONE 1000 MG: 1 INJECTION, SOLUTION INTRAVENOUS at 18:23

## 2024-05-30 RX ADMIN — AMITRIPTYLINE HYDROCHLORIDE 75 MG: 25 TABLET, FILM COATED ORAL at 21:12

## 2024-05-30 RX ADMIN — SERTRALINE 100 MG: 100 TABLET, FILM COATED ORAL at 13:57

## 2024-05-30 RX ADMIN — FUROSEMIDE 80 MG: 10 INJECTION, SOLUTION INTRAMUSCULAR; INTRAVENOUS at 18:23

## 2024-05-30 RX ADMIN — SPIRONOLACTONE 25 MG: 25 TABLET ORAL at 09:30

## 2024-05-30 RX ADMIN — OXYCODONE HYDROCHLORIDE 10 MG: 10 TABLET ORAL at 05:01

## 2024-05-30 RX ADMIN — FUROSEMIDE 80 MG: 10 INJECTION, SOLUTION INTRAMUSCULAR; INTRAVENOUS at 09:29

## 2024-05-30 RX ADMIN — LIDOCAINE 5% 1 PATCH: 700 PATCH TOPICAL at 18:23

## 2024-05-30 NOTE — ASSESSMENT & PLAN NOTE
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 continued on Sandostatin Q 28 days. Overall tolerates treatment well. Continue current treatment    - Carcinoid tumor marker Chromogranin A level has been normal however 5 HIAA has increased. Finally insurance approved Gallium/ PET scan 8/2/2022 report stable disease.\"   -- per recent Heme Onc note from [place]  "

## 2024-05-30 NOTE — PROGRESS NOTES
"[facility]  Progress Note  Name: Bailey Olsen I  MRN: [id number]  Unit/Bed#: -01 I Date of Admission: 5/26/2024   Date of Service: 5/30/2024 I Hospital Day: 4    Assessment & Plan   * Bilateral pulmonary infiltrates  Assessment & Plan  53-year-old female with history of cirrhosis, carcinoid tumor who initially presented with shortness of breath peripheral edema  CTA revealed \"Bilateral multi lobar consolidation with evidence of interstitial edema, and small bilateral pleural effusions. Findings may represent multifocal pneumonia (infectious or organizing pneumonia) with superimposed vascular congestion. Pulmonary hemorrhage\" . Neg for pulm embolism  Currently afebrile, WBC count within normal limits  Pro-Ernesto within normal limits placed on ceftriaxone for now.complete 5 days  Consolidations may be secondary to CHF  Will provide IV Lasix as outlined below   pulmonology consult for further evaluation due to pancytopenia and carcinoid tumor history.  Repeat CT scan of the chest in 8 weeks or earlier if she develops worsening respiratory symptoms. If she worsens she will most likely need bronchoscopy and bronchoalveolar lavage.     Acute on chronic diastolic (congestive) heart failure (HCC)  Assessment & Plan  Wt Readings from Last 3 Encounters:   05/30/24 126 kg (277 lb 3.2 oz)     Presented with shortness of breath with exertion and peripheral edema  CT scan revealed pulmonary edema  patient has lost 17 pounds since admission increase Lasix from 40 mg-60 mg - 80 IV twice daily and also add low-dose Aldactone due to history of liver cirrhosis  2 d echo normal lv function  Renal function stable so far.hold lisinopril to cont diuresis and avoid hypotension  Also order overnight oxygen requirement study as she appears to require oxygen while sleeping and is fine during the day          Sarcoidosis  Assessment & Plan  History of sarcoidosis of the skin, complaining of a lot of itching " "swelling and discomfort in her legs.  Also has elevated inflammatory markers  Will place on a short course of prednisone 40 mg daily for 3 to 4 days and observe and recommend outpatient follow-up for her sarcoidosis  Will place on topical hydrocortisone cream to legs as well  Uric acid level is normal    Pancytopenia (HCC)  Assessment & Plan  Chronic, suspected to be from splenomegaly and liver cirrhosis  [facility]s with [facility]  Cell counts on admission appear stable from most recent outpatient labs in early May  trend    Iron deficiency anemia  Assessment & Plan  History gastric bypass  Follows with [facility]  Currently Hgb around baseline (10.4 on outpatient labs early may)      Fibromyalgia  Assessment & Plan  Noted history of chronic diffuse pain  Has been referred to Rheumatology in past  Continue Plaquenil    Anxiety  Assessment & Plan  Anxiety/Depression  Continue amitryptiline.stable    Primary hypertension  Assessment & Plan  Hypertensive on admission, Hx HTN in chart  On lasix  hold lisinopril   Follow BPs    Class 3 severe obesity due to excess calories in adult (HCC)  Assessment & Plan  Dietary and lifestyle changes    History of malignant carcinoid tumor of small intestine  Assessment & Plan  \"- GI Carcinoid tumor, 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 continued on Sandostatin Q 28 days. Overall tolerates treatment well. Continue current treatment    - Carcinoid tumor marker Chromogranin A level has been normal however 5 HIAA has increased. 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 Sequential Compression Devices Ordered.    Mobility:   Basic Mobility Inpatient Raw Score: 23  JH-HLM Goal: 7: Walk 25 feet or more  JH-HLM Achieved: 7: Walk 25 feet or more  JH-HLM Goal achieved. Continue to encourage appropriate mobility.    Patient Centered Rounds: I performed bedside rounds with nursing staff today.   Discussions with Specialists or Other Care Team Provider: none    Education and Discussions with Family / Patient:     Total Time Spent on Date of Encounter in care of patient: 35 mins. This time was spent on one or more of the following: performing physical exam; counseling and coordination of care; obtaining or reviewing history; documenting in the medical record; reviewing/ordering tests, medications or procedures; communicating with other healthcare professionals and discussing with patient's family/caregivers.    Current Length of Stay: 4 day(s)  Current Patient Status: Inpatient   Certification Statement: The patient will continue to require additional inpatient hospital stay due to    Discharge Plan: Anticipate discharge in 24-48 hrs to home.    Code Status: Level 1 - Full Code    Subjective:   Patient states that she is urinating well however complaining of a lot of discomfort in both of her legs with itching and pain.  Known history of sarcoid    Objective:     Vitals:   Temp (24hrs), Av.9 °F (36.6 °C), Min:97.7 °F (36.5 °C), Max:98.1 °F (36.7 °C)    Temp:  [97.7 °F (36.5 °C)-98.1 °F (36.7 °C)] 97.7 °F (36.5 °C)  HR:  [83-97] 89  Resp:  [18] 18  BP: (105-116)/(44-70) 109/44  SpO2:  [90 %-100 %] 98 %  Body mass index is 46.13 kg/m².     Input and Output Summary (last 24 hours):     Intake/Output Summary (Last 24 hours) at 2024 1221  Last  data filed at 5/30/2024 1034  Gross per 24 hour   Intake 1600 ml   Output 3730 ml   Net -2130 ml       Physical Exam:   Physical Exam  Vitals and nursing note reviewed.   Constitutional:       Appearance: Normal appearance.   HENT:      Head: Normocephalic and atraumatic.      Right Ear: External ear normal.      Left Ear: External ear normal.      Nose: Nose normal.      Mouth/Throat:      Pharynx: Oropharynx is clear.   Eyes:      Pupils: Pupils are equal, round, and reactive to light.   Cardiovascular:      Rate and Rhythm: Normal rate and regular rhythm.      Heart sounds: Normal heart sounds.   Pulmonary:      Effort: Pulmonary effort is normal.      Breath sounds: Rales present.   Abdominal:      General: Bowel sounds are normal.      Palpations: Abdomen is soft.      Tenderness: There is no abdominal tenderness.   Musculoskeletal:         General: Normal range of motion.      Cervical back: Normal range of motion and neck supple.      Right lower leg: Edema present.      Left lower leg: Edema present.   Skin:     General: Skin is warm and dry.      Capillary Refill: Capillary refill takes less than 2 seconds.      Findings: Rash present.   Neurological:      General: No focal deficit present.      Mental Status: She is alert and oriented to person, place, and time.   Psychiatric:         Mood and Affect: Mood normal.            Additional Data:     Labs:  Results from last 7 days   Lab Units 05/28/24  0449 05/27/24  0600   WBC Thousand/uL 2.76* 2.38*   HEMOGLOBIN g/dL 10.3* 10.2*   HEMATOCRIT % 32.3* 32.1*   PLATELETS Thousands/uL 75* 62*   SEGS PCT %  --  61   LYMPHO PCT %  --  27   MONO PCT %  --  7   EOS PCT %  --  3     Results from last 7 days   Lab Units 05/30/24  0512 05/29/24  0526 05/28/24  0449   SODIUM mmol/L 138   < > 138   POTASSIUM mmol/L 3.9   < > 3.8   CHLORIDE mmol/L 101   < > 105   CO2 mmol/L 32   < > 29   BUN mg/dL 13   < > 12   CREATININE mg/dL 0.72   < > 0.66   ANION GAP mmol/L 5   < > 4    CALCIUM mg/dL 8.3*   < > 8.0*   ALBUMIN g/dL  --   --  3.2*   TOTAL BILIRUBIN mg/dL  --   --  1.28*   ALK PHOS U/L  --   --  171*   ALT U/L  --   --  10   AST U/L  --   --  27   GLUCOSE RANDOM mg/dL 114   < > 117    < > = values in this interval not displayed.     Results from last 7 days   Lab Units 05/26/24  1446   INR  1.27*             Results from last 7 days   Lab Units 05/27/24  0600 05/26/24  1446   LACTIC ACID mmol/L  --  1.8   PROCALCITONIN ng/ml <0.05 <0.05       Lines/Drains:  Invasive Devices       Peripheral Intravenous Line  Duration             Peripheral IV 05/30/24 Distal;Dorsal (posterior);Left Forearm <1 day                          Imaging: reviewed cta chest    Recent Cultures (last 7 days):   Results from last 7 days   Lab Units 05/29/24  1058 05/26/24  1634 05/26/24  1630   BLOOD CULTURE   --  No Growth at 72 hrs. No Growth at 72 hrs.   SPUTUM CULTURE  Culture too young- will reincubate  --   --        Last 24 Hours Medication List:   Current Facility-Administered Medications   Medication Dose Route Frequency Provider Last Rate    acetaminophen  650 mg Oral Q6H PRN Angel Cordero MD      amitriptyline  75 mg Oral HS Angel Cordero MD      benzonatate  100 mg Oral TID PRN Angel Cordero MD      cefTRIAXone  1,000 mg Intravenous Q24H Angel Cordero MD 1,000 mg (05/29/24 1754)    furosemide  80 mg Intravenous BID Idalia Stuart MD      gabapentin  100 mg Oral BID Idalia Stuart MD      hydrocortisone   Topical 4x Daily PRN Idalia Stuart MD      hydroxychloroquine  200 mg Oral BID With Meals Angel Cordero MD      ipratropium-albuterol  3 mL Nebulization Q6H PRN Angel Cordero MD      lactulose  10 g Oral BID PRN Idalia Stuart MD      lidocaine  1 patch Topical Daily Idalia Stuart MD      oxyCODONE  10 mg Oral Q4H PRN Angel Cordero MD      polyethylene glycol  17 g Oral Daily PRN Idalia Stuart MD      predniSONE  40 mg Oral Daily Idalia Stuart MD      rifaximin  550 mg Oral BID Angel Cordero MD       spironolactone  25 mg Oral Daily Idalia Stuart MD          Today, Patient Was Seen By: Idalia Stuart MD    **Please Note: This note may have been constructed using a voice recognition system.**

## 2024-05-30 NOTE — UTILIZATION REVIEW
Continued Stay Review    Date: 05/30/24                          Current Patient Class: IP  Current Level of Care: Med/Surg    HPI:53 y.o. female initially admitted on 5/26 for b/l pulmonary infiltrates.      Assessment/Plan: Pt reports urinating well, but notes discomfort in b/l LE w/ itching and pain. On exam, rales, b/l LE edema, rash. Plan: IV lasix increased to 80 mg BID, DW, I&O, low dose aldactone, hold lisinopril, Supplemental O2, weaned as tolerated (requiring O2 overnight); check O2 overnight study. PO prednisone, hydrocortisone cream to b/l LE. Continue other current meds.     Vital Signs:   Date/Time Temp Pulse Resp BP MAP (mmHg) SpO2 O2 Device   05/30/24 0934 -- 89 -- 109/44 Abnormal  44 Abnormal  98 % None (Room air)   05/30/24 0930 -- -- -- -- -- 100 % None (Room air)   05/30/24 07:21:06 97.7 °F (36.5 °C) 83 18 111/56 74 98 % --   05/29/24 22:41:57 98.1 °F (36.7 °C) 91 18 112/64 80 93 % --   05/29/24 1830 -- -- -- -- -- 90 % None (Room air)   05/29/24 1759 -- 97 -- 105/46 Abnormal  66 -- --   05/29/24 1700 -- -- -- -- -- 97 % None (Room air)   05/29/24 15:39:37 -- -- -- 116/70 85 -- --       Pertinent Labs/Diagnostic Results:   Results from last 7 days   Lab Units 05/26/24  1446   SARS-COV-2  Negative     Results from last 7 days   Lab Units 05/28/24  0449 05/27/24  0600 05/26/24  1446   WBC Thousand/uL 2.76* 2.38* 2.54*   HEMOGLOBIN g/dL 10.3* 10.2* 10.7*   HEMATOCRIT % 32.3* 32.1* 33.1*   PLATELETS Thousands/uL 75* 62* 64*   TOTAL NEUT ABS Thousands/µL  --  1.46* 1.90         Results from last 7 days   Lab Units 05/30/24  0512 05/29/24  0526 05/28/24  0449 05/27/24  0600 05/26/24  1446   SODIUM mmol/L 138 135 138 140 140   POTASSIUM mmol/L 3.9 3.9 3.8 3.4* 3.3*   CHLORIDE mmol/L 101 104 105 104 105   CO2 mmol/L 32 25 29 33* 29   ANION GAP mmol/L 5 6 4 3* 6   BUN mg/dL 13 14 12 8 6   CREATININE mg/dL 0.72 0.78 0.66 0.62 0.57*   EGFR ml/min/1.73sq m 95 86 100 102 105   CALCIUM mg/dL 8.3* 8.2* 8.0*  8.0* 8.1*   MAGNESIUM mg/dL  --  2.1  --  2.2 1.7*     Results from last 7 days   Lab Units 05/28/24  0449 05/27/24  0600 05/26/24  1446   AST U/L 27 33 38   ALT U/L 10 13 15   ALK PHOS U/L 171* 175* 178*   TOTAL PROTEIN g/dL 6.8 6.7 7.0   ALBUMIN g/dL 3.2* 3.0* 3.4*   TOTAL BILIRUBIN mg/dL 1.28* 1.32* 1.48*   BILIRUBIN DIRECT mg/dL  --  0.40*  --          Results from last 7 days   Lab Units 05/30/24  0512 05/29/24  0526 05/28/24  0449 05/27/24  0600 05/26/24  1446   GLUCOSE RANDOM mg/dL 114 115 117 117 122      Results from last 7 days   Lab Units 05/26/24  1932 05/26/24  1617 05/26/24  1446   HS TNI 0HR ng/L  --   --  17   HS TNI 2HR ng/L  --  17  --    HSTNI D2 ng/L  --  0  --    HS TNI 4HR ng/L 17  --   --    HSTNI D4 ng/L 0  --   --          Results from last 7 days   Lab Units 05/26/24  1446   PROTIME seconds 16.3*   INR  1.27*   PTT seconds 28         Results from last 7 days   Lab Units 05/27/24  0600 05/26/24  1446   PROCALCITONIN ng/ml <0.05 <0.05     Results from last 7 days   Lab Units 05/26/24  1446   LACTIC ACID mmol/L 1.8             Results from last 7 days   Lab Units 05/26/24  1446   BNP pg/mL 133*         Results from last 7 days   Lab Units 05/26/24  1446   LIPASE u/L 11     Results from last 7 days   Lab Units 05/30/24  0512   CRP mg/L 51.4*      Results from last 7 days   Lab Units 05/26/24  1446   INFLUENZA A PCR  Negative   INFLUENZA B PCR  Negative   RSV PCR  Negative      Results from last 7 days   Lab Units 05/29/24  1058 05/26/24  1634 05/26/24  1630   BLOOD CULTURE   --  No Growth at 72 hrs. No Growth at 72 hrs.   SPUTUM CULTURE  Culture too young- will reincubate  --   --          Medications:   Scheduled Medications:  amitriptyline, 75 mg, Oral, HS  cefTRIAXone, 1,000 mg, Intravenous, Q24H  dapsone, 50 mg, Oral, Daily  furosemide, 80 mg, Intravenous, BID  gabapentin, 100 mg, Oral, BID  hydroxychloroquine, 200 mg, Oral, BID With Meals  lidocaine, 1 patch, Topical, Daily  predniSONE, 40  mg, Oral, Daily  rifaximin, 550 mg, Oral, BID  sertraline, 100 mg, Oral, Daily  spironolactone, 25 mg, Oral, Daily    Continuous IV Infusions: none    PRN Meds:  acetaminophen, 650 mg, Oral, Q6H PRN  benzonatate, 100 mg, Oral, TID PRN  hydrocortisone, , Topical, 4x Daily PRN  ipratropium-albuterol, 3 mL, Nebulization, Q6H PRN  lactulose, 10 g, Oral, BID PRN  oxyCODONE, 10 mg, Oral, Q4H PRN; 5/30 x1  polyethylene glycol, 17 g, Oral, Daily PRN        Discharge Plan: TBD    Network Utilization Review Department  ATTENTION: Please call with any questions or concerns to [phone] and carefully listen to the prompts so that you are directed to the right person. All voicemails are confidential.   For Discharge needs, contact Care Management DC Support Team at [phone] opt. [phone]  Send all requests for admission clinical reviews, approved or denied determinations and any other requests to dedicated fax number below belonging to the [place] where the patient is receiving treatment. List of dedicated fax numbers for the Facilities:  FACILITY NAME UR FAX NUMBER   ADMISSION DENIALS (Administrative/Medical Necessity) [phone]   DISCHARGE SUPPORT TEAM (NETWORK) [phone]   PARENT CHILD HEALTH (Maternity/NICU/Pediatrics) [phone]   [facility] [phone]   [facility] [phone]   formerly [facility] [phone]   [facility] [phone]   On license of [facility] [phone]   [facility] [phone]   [facility] [phone]   [facility] [phone]   [facility] [phone]   [facility] [phone]   [facility] [phone]   [facility] [phone]

## 2024-05-30 NOTE — PLAN OF CARE
Problem: PAIN - ADULT  Goal: Verbalizes/displays adequate comfort level or baseline comfort level  Description: Interventions:  - Encourage patient to monitor pain and request assistance  - Assess pain using appropriate pain scale  - Administer analgesics based on type and severity of pain and evaluate response  - Implement non-pharmacological measures as appropriate and evaluate response  - Consider cultural and social influences on pain and pain management  - Notify physician/advanced practitioner if interventions unsuccessful or patient reports new pain  Outcome: Progressing     Problem: INFECTION - ADULT  Goal: Absence or prevention of progression during hospitalization  Description: INTERVENTIONS:  - Assess and monitor for signs and symptoms of infection  - Monitor lab/diagnostic results  - Monitor all insertion sites, i.e. indwelling lines, tubes, and drains  - Monitor endotracheal if appropriate and nasal secretions for changes in amount and color  - [place] appropriate cooling/warming therapies per order  - Administer medications as ordered  - Instruct and encourage patient and family to use good hand hygiene technique  - Identify and instruct in appropriate isolation precautions for identified infection/condition  Outcome: Progressing  Goal: Absence of fever/infection during neutropenic period  Description: INTERVENTIONS:  - Monitor WBC    Outcome: Progressing     Problem: SAFETY ADULT  Goal: Patient will remain free of falls  Description: INTERVENTIONS:  - Educate patient/family on patient safety including physical limitations  - Instruct patient to call for assistance with activity   - Consult OT/PT to assist with strengthening/mobility   - Keep Call bell within reach  - Keep bed low and locked with side rails adjusted as appropriate  - Keep care items and personal belongings within reach  - Initiate and maintain comfort rounds  - Apply yellow socks and bracelet for high fall risk patients  - Consider  moving patient to room near nurses station  Outcome: Progressing  Goal: Maintain or return to baseline ADL function  Description: INTERVENTIONS:  -  Assess patient's ability to carry out ADLs; assess patient's baseline for ADL function and identify physical deficits which impact ability to perform ADLs (bathing, care of mouth/teeth, toileting, grooming, dressing, etc.)  - Assess/evaluate cause of self-care deficits   - Assess range of motion  - Assess patient's mobility; develop plan if impaired  - Assess patient's need for assistive devices and provide as appropriate  - Encourage maximum independence but intervene and supervise when necessary  - Involve family in performance of ADLs  - Assess for home care needs following discharge   - Consider OT consult to assist with ADL evaluation and planning for discharge  - Provide patient education as appropriate  Outcome: Progressing  Goal: Maintains/Returns to pre admission functional level  Description: INTERVENTIONS:  - Perform AM-PAC 6 Click Basic Mobility/ Daily Activity assessment daily.  - Set and communicate daily mobility goal to care team and patient/family/caregiver.   - Collaborate with rehabilitation services on mobility goals if consulted  - Perform Range of Motion 3 times a day.  - Reposition patient every 2 hours.  - Dangle patient 3 times a day  - Stand patient 3 times a day  - Ambulate patient 3 times a day  - Out of bed to chair 3 times a day   - Out of bed for meals 3 times a day  - Out of bed for toileting  - Record patient progress and toleration of activity level   Outcome: Progressing     Problem: DISCHARGE PLANNING  Goal: Discharge to home or other facility with appropriate resources  Description: INTERVENTIONS:  - Identify barriers to discharge w/patient and caregiver  - Arrange for needed discharge resources and transportation as appropriate  - Identify discharge learning needs (meds, wound care, etc.)  - Arrange for interpretive services to  assist at discharge as needed  - Refer to Case Management Department for coordinating discharge planning if the patient needs post-hospital services based on physician/advanced practitioner order or complex needs related to functional status, cognitive ability, or social support system  Outcome: Progressing     Problem: Knowledge Deficit  Goal: Patient/family/caregiver demonstrates understanding of disease process, treatment plan, medications, and discharge instructions  Description: Complete learning assessment and assess knowledge base.  Interventions:  - Provide teaching at level of understanding  - Provide teaching via preferred learning methods  Outcome: Progressing     Problem: RESPIRATORY - ADULT  Goal: Achieves optimal ventilation and oxygenation  Description: INTERVENTIONS:  - Assess for changes in respiratory status  - Assess for changes in mentation and behavior  - Position to facilitate oxygenation and minimize respiratory effort  - Oxygen administered by appropriate delivery if ordered  - Initiate smoking cessation education as indicated  - Encourage broncho-pulmonary hygiene including cough, deep breathe, Incentive Spirometry  - Assess the need for suctioning and aspirate as needed  - Assess and instruct to report SOB or any respiratory difficulty  - Respiratory Therapy support as indicated  Outcome: Progressing     Problem: METABOLIC, FLUID AND ELECTROLYTES - ADULT  Goal: Electrolytes maintained within normal limits  Description: INTERVENTIONS:  - Monitor labs and assess patient for signs and symptoms of electrolyte imbalances  - Administer electrolyte replacement as ordered  - Monitor response to electrolyte replacements, including repeat lab results as appropriate  - Instruct patient on fluid and nutrition as appropriate  Outcome: Progressing  Goal: Fluid balance maintained  Description: INTERVENTIONS:  - Monitor labs   - Monitor I/O and WT  - Instruct patient on fluid and nutrition as  appropriate  - Assess for signs & symptoms of volume excess or deficit  Outcome: Progressing  Goal: Glucose maintained within target range  Description: INTERVENTIONS:  - Monitor Blood Glucose as ordered  - Assess for signs and symptoms of hyperglycemia and hypoglycemia  - Administer ordered medications to maintain glucose within target range  - Assess nutritional intake and initiate nutrition service referral as needed  Outcome: Progressing     Problem: Nutrition/Hydration-ADULT  Goal: Nutrient/Hydration intake appropriate for improving, restoring or maintaining nutritional needs  Description: Monitor and assess patient's nutrition/hydration status for malnutrition. Collaborate with interdisciplinary team and initiate plan and interventions as ordered.  Monitor patient's weight and dietary intake as ordered or per policy. Utilize nutrition screening tool and intervene as necessary. Determine patient's food preferences and provide high-protein, high-caloric foods as appropriate.     INTERVENTIONS:  - Monitor oral intake, urinary output, labs, and treatment plans  - Assess nutrition and hydration status and recommend course of action  - Evaluate amount of meals eaten  - Assist patient with eating if necessary   - Allow adequate time for meals  - Recommend/ encourage appropriate diets, oral nutritional supplements, and vitamin/mineral supplements  - Order, calculate, and assess calorie counts as needed  - Recommend, monitor, and adjust tube feedings and TPN/PPN based on assessed needs  - Assess need for intravenous fluids  - Provide specific nutrition/hydration education as appropriate  - Include patient/family/caregiver in decisions related to nutrition  Outcome: Progressing     Problem: Prexisting or High Potential for Compromised Skin Integrity  Goal: Skin integrity is maintained or improved  Description: INTERVENTIONS:  - Identify patients at risk for skin breakdown  - Assess and monitor skin integrity  - Assess  and monitor nutrition and hydration status  - Monitor labs   - Assess for incontinence   - Turn and reposition patient  - Assist with mobility/ambulation  - Relieve pressure over bony prominences  - Avoid friction and shearing  - Provide appropriate hygiene as needed including keeping skin clean and dry  - Evaluate need for skin moisturizer/barrier cream  - Collaborate with interdisciplinary team   - Patient/family teaching  - Consider wound care consult   Outcome: Progressing     Problem: CARDIOVASCULAR - ADULT  Goal: Maintains optimal cardiac output and hemodynamic stability  Description: INTERVENTIONS:  - Monitor I/O, vital signs and rhythm  - Monitor for S/S and trends of decreased cardiac output  - Administer and titrate ordered vasoactive medications to optimize hemodynamic stability  - Assess quality of pulses, skin color and temperature  - Assess for signs of decreased coronary artery perfusion  - Instruct patient to report change in severity of symptoms  Outcome: Progressing  Goal: Absence of cardiac dysrhythmias or at baseline rhythm  Description: INTERVENTIONS:  - Continuous cardiac monitoring, vital signs, obtain 12 lead EKG if ordered  - Administer antiarrhythmic and heart rate control medications as ordered  - Monitor electrolytes and administer replacement therapy as ordered  Outcome: Progressing

## 2024-05-30 NOTE — ASSESSMENT & PLAN NOTE
Wt Readings from Last 3 Encounters:   05/30/24 126 kg (277 lb 3.2 oz)     Presented with shortness of breath with exertion and peripheral edema  CT scan revealed pulmonary edema  patient has lost 17 pounds since admission increase Lasix from 40 mg-60 mg - 80 IV twice daily and also add low-dose Aldactone due to history of liver cirrhosis  2 d echo normal lv function  Renal function stable so far.hold lisinopril to cont diuresis and avoid hypotension  Also order overnight oxygen requirement study as she appears to require oxygen while sleeping and is fine during the day

## 2024-05-30 NOTE — TELEPHONE ENCOUNTER
----- Message from Philip Li PA-C sent at 5/30/2024  3:16 PM EDT -----  Hello,     Please schedule Bailey Olsen for a HFU within the next 4 weeks after tentative discharge date of 5/31    Preferred office location: [facility]    AP to be scheduled with: Arlette    Patient will need none    Patient discharge to Home        Note: If message sent over the weekend please call patient to confirm appointment time, Date, and location.

## 2024-05-30 NOTE — TELEPHONE ENCOUNTER
Called and left voice message for patient about scheduling Hospital follow in 4 weeks following discharge on 5/31. Gave patient [facility] phone number to call for scheduling.     [phone]

## 2024-05-30 NOTE — ASSESSMENT & PLAN NOTE
History of sarcoidosis of the skin, complaining of a lot of itching swelling and discomfort in her legs.  Also has elevated inflammatory markers  Will place on a short course of prednisone 40 mg daily for 3 to 4 days and observe and recommend outpatient follow-up for her sarcoidosis  Will place on topical hydrocortisone cream to legs as well  Uric acid level is normal

## 2024-05-31 ENCOUNTER — APPOINTMENT (INPATIENT)
Dept: NON INVASIVE DIAGNOSTICS | Facility: HOSPITAL | Age: 54
DRG: 291 | End: 2024-05-31
Payer: COMMERCIAL

## 2024-05-31 LAB
ANION GAP SERPL CALCULATED.3IONS-SCNC: 2 MMOL/L (ref 4–13)
BACTERIA BLD CULT: NORMAL
BACTERIA BLD CULT: NORMAL
BACTERIA SPT RESP CULT: ABNORMAL
BUN SERPL-MCNC: 14 MG/DL (ref 5–25)
CALCIUM SERPL-MCNC: 8.7 MG/DL (ref 8.4–10.2)
CHLORIDE SERPL-SCNC: 101 MMOL/L (ref 96–108)
CO2 SERPL-SCNC: 33 MMOL/L (ref 21–32)
CREAT SERPL-MCNC: 0.69 MG/DL (ref 0.6–1.3)
DME PARACHUTE DELIVERY DATE REQUESTED: NORMAL
DME PARACHUTE ITEM DESCRIPTION: NORMAL
DME PARACHUTE ORDER STATUS: NORMAL
DME PARACHUTE SUPPLIER NAME: NORMAL
DME PARACHUTE SUPPLIER PHONE: NORMAL
GFR SERPL CREATININE-BSD FRML MDRD: 99 ML/MIN/1.73SQ M
GLUCOSE SERPL-MCNC: 120 MG/DL (ref 65–140)
GRAM STN SPEC: ABNORMAL
GRAM STN SPEC: ABNORMAL
POTASSIUM SERPL-SCNC: 4.4 MMOL/L (ref 3.5–5.3)
SODIUM SERPL-SCNC: 136 MMOL/L (ref 135–147)

## 2024-05-31 PROCEDURE — 94762 N-INVAS EAR/PLS OXIMTRY CONT: CPT

## 2024-05-31 PROCEDURE — 93970 EXTREMITY STUDY: CPT | Performed by: SURGERY

## 2024-05-31 PROCEDURE — 99232 SBSQ HOSP IP/OBS MODERATE 35: CPT | Performed by: FAMILY MEDICINE

## 2024-05-31 PROCEDURE — 93970 EXTREMITY STUDY: CPT

## 2024-05-31 PROCEDURE — 80048 BASIC METABOLIC PNL TOTAL CA: CPT | Performed by: FAMILY MEDICINE

## 2024-05-31 PROCEDURE — 99232 SBSQ HOSP IP/OBS MODERATE 35: CPT | Performed by: INTERNAL MEDICINE

## 2024-05-31 RX ORDER — CEFTRIAXONE 1 G/50ML
1000 INJECTION, SOLUTION INTRAVENOUS EVERY 24 HOURS
Status: COMPLETED | OUTPATIENT
Start: 2024-05-31 | End: 2024-05-31

## 2024-05-31 RX ADMIN — HYDROXYCHLOROQUINE SULFATE 200 MG: 200 TABLET, FILM COATED ORAL at 16:27

## 2024-05-31 RX ADMIN — FUROSEMIDE 80 MG: 10 INJECTION, SOLUTION INTRAMUSCULAR; INTRAVENOUS at 18:37

## 2024-05-31 RX ADMIN — SERTRALINE 100 MG: 100 TABLET, FILM COATED ORAL at 08:05

## 2024-05-31 RX ADMIN — OXYCODONE HYDROCHLORIDE 10 MG: 10 TABLET ORAL at 13:14

## 2024-05-31 RX ADMIN — OXYCODONE HYDROCHLORIDE 10 MG: 10 TABLET ORAL at 21:18

## 2024-05-31 RX ADMIN — GABAPENTIN 100 MG: 100 CAPSULE ORAL at 18:36

## 2024-05-31 RX ADMIN — PREDNISONE 40 MG: 20 TABLET ORAL at 08:05

## 2024-05-31 RX ADMIN — ACETAMINOPHEN 325MG 650 MG: 325 TABLET ORAL at 15:27

## 2024-05-31 RX ADMIN — RIFAXIMIN 550 MG: 550 TABLET ORAL at 08:05

## 2024-05-31 RX ADMIN — FUROSEMIDE 80 MG: 10 INJECTION, SOLUTION INTRAMUSCULAR; INTRAVENOUS at 08:05

## 2024-05-31 RX ADMIN — DAPSONE 50 MG: 25 TABLET ORAL at 12:00

## 2024-05-31 RX ADMIN — CEFTRIAXONE 1000 MG: 1 INJECTION, SOLUTION INTRAVENOUS at 15:27

## 2024-05-31 RX ADMIN — GABAPENTIN 100 MG: 100 CAPSULE ORAL at 08:05

## 2024-05-31 RX ADMIN — RIFAXIMIN 550 MG: 550 TABLET ORAL at 18:36

## 2024-05-31 RX ADMIN — AMITRIPTYLINE HYDROCHLORIDE 75 MG: 25 TABLET, FILM COATED ORAL at 21:18

## 2024-05-31 RX ADMIN — LIDOCAINE 5% 1 PATCH: 700 PATCH TOPICAL at 15:27

## 2024-05-31 RX ADMIN — SPIRONOLACTONE 25 MG: 25 TABLET ORAL at 08:05

## 2024-05-31 RX ADMIN — HYDROXYCHLOROQUINE SULFATE 200 MG: 200 TABLET, FILM COATED ORAL at 08:05

## 2024-05-31 RX ADMIN — OXYCODONE HYDROCHLORIDE 10 MG: 10 TABLET ORAL at 08:23

## 2024-05-31 NOTE — UTILIZATION REVIEW
Continued Stay Review    Date: 5/31                          Current Patient Class: IP   Current Level of Care: MS    HPI:53 y.o. female initially admitted on  5/26    Assessment/Plan:     5/31 IM Note   Swelling decreasing . Lost an additional 7 lbs yest . Renal function stable so far.hold lisinopril to cont diuresis and avoid hypotension. overnight oxygen requirement study done and does not qualify for nocturnal oxygen.recommend sleep study. Cont IV lasix. -4800 ml output last 24 hrs . BLE edema . Cont IV abx .      Vital Signs:   05/31/24 0923 -- -- -- -- -- -- -- -- None (Room air)   05/31/24 08:06:31 -- 77 -- 132/75 94 98 % -- -- --   05/31/24 0700 98.1 °F (36.7 °C) 87 18 129/66 84 -- --         Pertinent Labs/Diagnostic Results:   Results from last 7 days   Lab Units 05/26/24  1446   SARS-COV-2  Negative     Results from last 7 days   Lab Units 05/28/24  0449 05/27/24  0600 05/26/24  1446   WBC Thousand/uL 2.76* 2.38* 2.54*   HEMOGLOBIN g/dL 10.3* 10.2* 10.7*   HEMATOCRIT % 32.3* 32.1* 33.1*   PLATELETS Thousands/uL 75* 62* 64*   TOTAL NEUT ABS Thousands/µL  --  1.46* 1.90         Results from last 7 days   Lab Units 05/31/24  0507 05/30/24  0512 05/29/24  0526 05/28/24  0449 05/27/24  0600 05/26/24  1446   SODIUM mmol/L 136 138 135 138 140 140   POTASSIUM mmol/L 4.4 3.9 3.9 3.8 3.4* 3.3*   CHLORIDE mmol/L 101 101 104 105 104 105   CO2 mmol/L 33* 32 25 29 33* 29   ANION GAP mmol/L 2* 5 6 4 3* 6   BUN mg/dL 14 13 14 12 8 6   CREATININE mg/dL 0.69 0.72 0.78 0.66 0.62 0.57*   EGFR ml/min/1.73sq m 99 95 86 100 102 105   CALCIUM mg/dL 8.7 8.3* 8.2* 8.0* 8.0* 8.1*   MAGNESIUM mg/dL  --   --  2.1  --  2.2 1.7*     Results from last 7 days   Lab Units 05/28/24  0449 05/27/24  0600 05/26/24  1446   AST U/L 27 33 38   ALT U/L 10 13 15   ALK PHOS U/L 171* 175* 178*   TOTAL PROTEIN g/dL 6.8 6.7 7.0   ALBUMIN g/dL 3.2* 3.0* 3.4*   TOTAL BILIRUBIN mg/dL 1.28* 1.32* 1.48*   BILIRUBIN DIRECT mg/dL  --  0.40*  --           Results from last 7 days   Lab Units 05/31/24  0507 05/30/24  0512 05/29/24  0526 05/28/24  0449 05/27/24  0600 05/26/24  1446   GLUCOSE RANDOM mg/dL 120 114 115 117 117 122         Results from last 7 days   Lab Units 05/26/24  1932 05/26/24  1617 05/26/24  1446   HS TNI 0HR ng/L  --   --  17   HS TNI 2HR ng/L  --  17  --    HSTNI D2 ng/L  --  0  --    HS TNI 4HR ng/L 17  --   --    HSTNI D4 ng/L 0  --   --          Results from last 7 days   Lab Units 05/26/24  1446   PROTIME seconds 16.3*   INR  1.27*   PTT seconds 28         Results from last 7 days   Lab Units 05/27/24  0600 05/26/24  1446   PROCALCITONIN ng/ml <0.05 <0.05     Results from last 7 days   Lab Units 05/26/24  1446   LACTIC ACID mmol/L 1.8         Results from last 7 days   Lab Units 05/26/24  1446   BNP pg/mL 133*         Results from last 7 days   Lab Units 05/26/24  1446   LIPASE u/L 11     Results from last 7 days   Lab Units 05/30/24  0512   CRP mg/L 51.4*                 Results from last 7 days   Lab Units 05/26/24  1446   INFLUENZA A PCR  Negative   INFLUENZA B PCR  Negative   RSV PCR  Negative       Results from last 7 days   Lab Units 05/29/24  1058 05/26/24  1634 05/26/24  1630   BLOOD CULTURE   --  No Growth After 4 Days. No Growth After 4 Days.   SPUTUM CULTURE  4+ Growth of Candida albicans*  --   --    GRAM STAIN RESULT  2+ Polys*  1+ Budding yeast*  --   --        Medications:   Scheduled Medications:  amitriptyline, 75 mg, Oral, HS  cefTRIAXone, 1,000 mg, Intravenous, Q24H  dapsone, 50 mg, Oral, Daily  furosemide, 80 mg, Intravenous, BID  gabapentin, 100 mg, Oral, BID  hydroxychloroquine, 200 mg, Oral, BID With Meals  lidocaine, 1 patch, Topical, Daily  predniSONE, 40 mg, Oral, Daily  rifaximin, 550 mg, Oral, BID  sertraline, 100 mg, Oral, Daily  spironolactone, 25 mg, Oral, Daily      Continuous IV Infusions:     PRN Meds:  acetaminophen, 650 mg, Oral, Q6H PRN  benzonatate, 100 mg, Oral, TID PRN  hydrocortisone, , Topical, 4x  Daily PRN  ipratropium-albuterol, 3 mL, Nebulization, Q6H PRN  lactulose, 10 g, Oral, BID PRN  oxyCODONE, 10 mg, Oral, Q4H PRN  polyethylene glycol, 17 g, Oral, Daily PRN        Discharge Plan: TBD     Network Utilization Review Department  ATTENTION: Please call with any questions or concerns to [phone] and carefully listen to the prompts so that you are directed to the right person. All voicemails are confidential.   For Discharge needs, contact Care Management DC Support Team at [phone] opt. [phone]  Send all requests for admission clinical reviews, approved or denied determinations and any other requests to dedicated fax number below belonging to the campus where the patient is receiving treatment. List of dedicated fax numbers for the Facilities:  FACILITY NAME UR FAX NUMBER   ADMISSION DENIALS (Administrative/Medical Necessity) [phone]   DISCHARGE SUPPORT TEAM (NETWORK) [phone]   PARENT CHILD HEALTH (Maternity/NICU/Pediatrics) [phone]   [facility] [phone]   [facility] [phone]   [facility] [phone]   [facility] [phone]   [facility] [phone]   [facility] [phone]   [facility] [phone]   [facility] [phone]   [facility] [phone]   [facility] [phone]   [facility] [phone]   [facility] [phone]

## 2024-05-31 NOTE — PROGRESS NOTES
"[facility]  Progress Note  Name: Bailey Olsen I  MRN: [id number]  Unit/Bed#: -01 I Date of Admission: 5/26/2024   Date of Service: 5/31/2024 I Hospital Day: 5    Assessment & Plan   * Bilateral pulmonary infiltrates  Assessment & Plan  53-year-old female with history of cirrhosis, carcinoid tumor who initially presented with shortness of breath peripheral edema  CTA revealed \"Bilateral multi lobar consolidation with evidence of interstitial edema, and small bilateral pleural effusions. Findings may represent multifocal pneumonia (infectious or organizing pneumonia) with superimposed vascular congestion. Pulmonary hemorrhage\" . Neg for pulm embolism  Currently afebrile, WBC count within normal limits  Pro-Ernesto within normal limits placed on ceftriaxone and complete 5 doses.last dose today  Consolidations may be secondary to CHF  Will provide IV Lasix as outlined below   pulmonology consult for further evaluation due to pancytopenia and carcinoid tumor history.  Repeat CT scan of the chest in 8 weeks or earlier if she develops worsening respiratory symptoms. If she worsens she will most likely need bronchoscopy and bronchoalveolar lavage.     Acute on chronic diastolic (congestive) heart failure (HCC)  Assessment & Plan  Wt Readings from Last 3 Encounters:   05/31/24 123 kg (271 lb 2.7 oz)     Presented with shortness of breath with exertion and peripheral edema  CT scan revealed pulmonary edema  patient has lost 23 pounds since admission increase Lasix from 40 mg-60 mg - 80 IV twice daily and also add low-dose Aldactone due to history of liver cirrhosis  2 d echo normal lv function  Renal function stable so far.hold lisinopril to cont diuresis and avoid hypotension  overnight oxygen requirement study done and does not qualify for nocturnal oxygen.recommend sleep study          Sarcoidosis  Assessment & Plan  History of sarcoidosis of the skin, complaining of a lot of itching " "swelling and discomfort in her legs.  Also has elevated inflammatory markers  Venous Doppler bilateral lower extremity negative for DVT  Will place on a short course of prednisone 40 mg daily for 3 to 4 days and observe and recommend outpatient follow-up for her sarcoidosis.resume dapsone 50 mg daily  Will place on topical hydrocortisone cream to legs as well  Uric acid level is normal    Pancytopenia (HCC)  Assessment & Plan  Chronic, suspected to be from splenomegaly and liver cirrhosis  [facility]s [facility]  Cell counts on admission appear stable from most recent outpatient labs in early May  trend    Iron deficiency anemia  Assessment & Plan  History gastric bypass  [facility]s with [facility]  Currently Hgb around baseline (10.4 on outpatient labs early may)      Fibromyalgia  Assessment & Plan  Noted history of chronic diffuse pain  Has been referred to Rheumatology in past  Continue Plaquenil    Anxiety  Assessment & Plan  Anxiety/Depression  Continue amitryptiline.stable    Primary hypertension  Assessment & Plan  Hypertensive on admission, Hx HTN in chart  On lasix  hold lisinopril   Follow BPs    Class 3 severe obesity due to excess calories in adult (HCC)  Assessment & Plan  Dietary and lifestyle changes    History of malignant carcinoid tumor of small intestine  Assessment & Plan  \"- GI Carcinoid tumor, dx 2006 involved small bowel/ duodenum and portal lymph nodes, s/p debulking in 7/2008 could not fully resect and was recieving Sandostatin q4 weeks at [facility] and follow-up 24 hr urine 5-HIAA level and chromogranin A level as well as octreotide scan or Gallium/PET scan  - Transfer care to [facility] in 8/2018. continued on Sandostatin Q 28 days. Overall tolerates treatment well. Continue current treatment    - Carcinoid tumor marker Chromogranin A level has been normal however 5 HIAA has increased. Finally insurance approved Gallium/ PET scan 8/2/2022 report stable disease.\"   -- per recent " Heme Onc note from [place]    Other cirrhosis of liver (HCC)  Assessment & Plan  Due to NAFLD  Hx HE  3/19/24 EGD with Grade 1 Varices  LFTs remain stable  -continue Xifaxin, lactulose, and lasix       VTE Pharmacologic Prophylaxis: VTE Score: 4 Moderate Risk (Score 3-4) - Pharmacological DVT Prophylaxis Contraindicated. Sequential Compression Devices Ordered.    Mobility:   Basic Mobility Inpatient Raw Score: 23  JH-HLM Goal: 7: Walk 25 feet or more  JH-HLM Achieved: 7: Walk 25 feet or more  JH-HLM Goal achieved. Continue to encourage appropriate mobility.    Patient Centered Rounds: I performed bedside rounds with nursing staff today.   Discussions with Specialists or Other Care Team Provider: none    Education and Discussions with Family / Patient:     Total Time Spent on Date of Encounter in care of patient: 35 mins. This time was spent on one or more of the following: performing physical exam; counseling and coordination of care; obtaining or reviewing history; documenting in the medical record; reviewing/ordering tests, medications or procedures; communicating with other healthcare professionals and discussing with patient's family/caregivers.    Current Length of Stay: 5 day(s)  Current Patient Status: Inpatient   Certification Statement: The patient will continue to require additional inpatient hospital stay due to chf  Discharge Plan: Anticipate discharge tomorrow to home.    Code Status: Level 1 - Full Code    Subjective:   Patient states that she started to feel better her swelling is decreasing lost another 7 pounds yesterday    Objective:     Vitals:   Temp (24hrs), Av.1 °F (36.7 °C), Min:98.1 °F (36.7 °C), Max:98.1 °F (36.7 °C)    Temp:  [98.1 °F (36.7 °C)] 98.1 °F (36.7 °C)  HR:  [77-91] 77  Resp:  [16-18] 18  BP: (107-139)/(63-75) 132/75  SpO2:  [87 %-98 %] 98 %  Body mass index is 45.12 kg/m².     Input and Output Summary (last 24 hours):     Intake/Output Summary (Last 24 hours) at 2024  1005  Last data filed at 5/31/2024 0900  Gross per 24 hour   Intake 1000 ml   Output 5800 ml   Net -4800 ml       Physical Exam:   Physical Exam  Vitals and nursing note reviewed.   Constitutional:       Appearance: Normal appearance.   HENT:      Head: Normocephalic and atraumatic.      Right Ear: External ear normal.      Left Ear: External ear normal.      Nose: Nose normal.      Mouth/Throat:      Pharynx: Oropharynx is clear.   Cardiovascular:      Rate and Rhythm: Normal rate and regular rhythm.      Heart sounds: Normal heart sounds.   Pulmonary:      Effort: Pulmonary effort is normal.      Breath sounds: Normal breath sounds.   Abdominal:      General: Bowel sounds are normal.      Palpations: Abdomen is soft.      Tenderness: There is no abdominal tenderness.   Musculoskeletal:         General: Normal range of motion.      Cervical back: Normal range of motion and neck supple.      Right lower leg: Edema present.      Left lower leg: Edema present.   Skin:     General: Skin is warm and dry.      Capillary Refill: Capillary refill takes less than 2 seconds.   Neurological:      General: No focal deficit present.      Mental Status: She is alert and oriented to person, place, and time.   Psychiatric:         Mood and Affect: Mood normal.            Additional Data:     Labs:  Results from last 7 days   Lab Units 05/28/24  0449 05/27/24  0600   WBC Thousand/uL 2.76* 2.38*   HEMOGLOBIN g/dL 10.3* 10.2*   HEMATOCRIT % 32.3* 32.1*   PLATELETS Thousands/uL 75* 62*   SEGS PCT %  --  61   LYMPHO PCT %  --  27   MONO PCT %  --  7   EOS PCT %  --  3     Results from last 7 days   Lab Units 05/31/24  0507 05/29/24  0526 05/28/24  0449   SODIUM mmol/L 136   < > 138   POTASSIUM mmol/L 4.4   < > 3.8   CHLORIDE mmol/L 101   < > 105   CO2 mmol/L 33*   < > 29   BUN mg/dL 14   < > 12   CREATININE mg/dL 0.69   < > 0.66   ANION GAP mmol/L 2*   < > 4   CALCIUM mg/dL 8.7   < > 8.0*   ALBUMIN g/dL  --   --  3.2*   TOTAL BILIRUBIN  mg/dL  --   --  1.28*   ALK PHOS U/L  --   --  171*   ALT U/L  --   --  10   AST U/L  --   --  27   GLUCOSE RANDOM mg/dL 120   < > 117    < > = values in this interval not displayed.     Results from last 7 days   Lab Units 05/26/24  1446   INR  1.27*             Results from last 7 days   Lab Units 05/27/24  0600 05/26/24  1446   LACTIC ACID mmol/L  --  1.8   PROCALCITONIN ng/ml <0.05 <0.05       Lines/Drains:  Invasive Devices       Peripheral Intravenous Line  Duration             Peripheral IV 05/30/24 Distal;Dorsal (posterior);Left Forearm 1 day                          Imaging: Reviewed radiology reports from this admission including: ultrasound(s)    Recent Cultures (last 7 days):   Results from last 7 days   Lab Units 05/29/24  1058 05/26/24  1634 05/26/24  1630   BLOOD CULTURE   --  No Growth After 4 Days. No Growth After 4 Days.   SPUTUM CULTURE  4+ Growth of Candida albicans*  --   --    GRAM STAIN RESULT  2+ Polys*  1+ Budding yeast*  --   --        Last 24 Hours Medication List:   Current Facility-Administered Medications   Medication Dose Route Frequency Provider Last Rate    acetaminophen  650 mg Oral Q6H PRN Angel Cordero MD      amitriptyline  75 mg Oral HS Angel Cordero MD      benzonatate  100 mg Oral TID PRN Angel Cordero MD      cefTRIAXone  1,000 mg Intravenous Q24H Idalia Stuart MD      dapsone  50 mg Oral Daily Idalia Stuart MD      furosemide  80 mg Intravenous BID Idalia Stuart MD      gabapentin  100 mg Oral BID Idalia Stuart MD      hydrocortisone   Topical 4x Daily PRN Idalia Stuart MD      hydroxychloroquine  200 mg Oral BID With Meals Angel Cordero MD      ipratropium-albuterol  3 mL Nebulization Q6H PRN Angel Cordero MD      lactulose  10 g Oral BID PRN Idalia Stuart MD      lidocaine  1 patch Topical Daily Idalia Stuart MD      oxyCODONE  10 mg Oral Q4H PRN Angel Cordero MD      polyethylene glycol  17 g Oral Daily PRN Idalia Stuart MD      predniSONE  40 mg Oral Daily Idalia Stuart MD       rifaximin  550 mg Oral BID Angel Cordero MD      sertraline  100 mg Oral Daily Idalia Stuart MD      spironolactone  25 mg Oral Daily Idalia Stuart MD          Today, Patient Was Seen By: Idalia Stuart MD    **Please Note: This note may have been constructed using a voice recognition system.**

## 2024-05-31 NOTE — ASSESSMENT & PLAN NOTE
"53-year-old female with history of cirrhosis, carcinoid tumor who initially presented with shortness of breath peripheral edema  CTA revealed \"Bilateral multi lobar consolidation with evidence of interstitial edema, and small bilateral pleural effusions. Findings may represent multifocal pneumonia (infectious or organizing pneumonia) with superimposed vascular congestion. Pulmonary hemorrhage\" . Neg for pulm embolism  Currently afebrile, WBC count within normal limits  Pro-Ernesto within normal limits placed on ceftriaxone and complete 5 doses.last dose today  Consolidations may be secondary to CHF  Will provide IV Lasix as outlined below   pulmonology consult for further evaluation due to pancytopenia and carcinoid tumor history.  Repeat CT scan of the chest in 8 weeks or earlier if she develops worsening respiratory symptoms. If she worsens she will most likely need bronchoscopy and bronchoalveolar lavage.   "

## 2024-05-31 NOTE — CASE MANAGEMENT
Case Management Discharge Planning Note    Patient name Bailey Olsen  Location /-01 MRN [id number]  : 1970 Date 2024       Current Admission Date: 2024  Current Admission Diagnosis:Bilateral pulmonary infiltrates   Patient Active Problem List    Diagnosis Date Noted Date Diagnosed    Other cirrhosis of liver (HCC) 2024     History of malignant carcinoid tumor of small intestine 2024     Class 3 severe obesity due to excess calories in adult (HCC) 2024     Primary hypertension 2024     Anxiety 2024     Hypokalemia 2024     Fibromyalgia 2024     Iron deficiency anemia 2024     Pancytopenia (HCC) 2024     Sarcoidosis 2024     Acute on chronic diastolic (congestive) heart failure (HCC) 2024     Bilateral pulmonary infiltrates 2024       LOS (days): 5  Geometric Mean LOS (GMLOS) (days):   Days to GMLOS:     OBJECTIVE:  Risk of Unplanned Readmission Score: 12.86         Current admission status: Inpatient   Preferred Pharmacy:   [facility] [address] [place] [address] [address] [facility]  [address] [facility] [address]  Phone: [phone] Fax: [phone]    Primary Care Provider: Nayely Brown DO    Primary Insurance: [facility]  Secondary Insurance:     DISCHARGE DETAILS:           CM received message via Tiger text from nurse Lexus Lopez RN that patient is requesting RW for home. CM placed order through [facility] for RW, pending insurance approval.       RW approved through [facility], RW delivered to patients bedside for home, order form signed by patient.                      DME Referral Provided  Referral made for DME?: Yes (Patient requested RW for home)  DME referral completed for the following items:: [place]  DME Supplier Name:: [facility]              Treatment Team Recommendation: Home  Discharge Destination Plan:: Home  Transport at Discharge : Family

## 2024-05-31 NOTE — ASSESSMENT & PLAN NOTE
Wt Readings from Last 3 Encounters:   05/31/24 123 kg (271 lb 2.7 oz)     Presented with shortness of breath with exertion and peripheral edema  CT scan revealed pulmonary edema  patient has lost 23 pounds since admission increase Lasix from 40 mg-60 mg - 80 IV twice daily and also add low-dose Aldactone due to history of liver cirrhosis  2 d echo normal lv function  Renal function stable so far.hold lisinopril to cont diuresis and avoid hypotension  overnight oxygen requirement study done and does not qualify for nocturnal oxygen.recommend sleep study

## 2024-05-31 NOTE — ASSESSMENT & PLAN NOTE
History of sarcoidosis of the skin, complaining of a lot of itching swelling and discomfort in her legs.  Also has elevated inflammatory markers  Venous Doppler bilateral lower extremity negative for DVT  Will place on a short course of prednisone 40 mg daily for 3 to 4 days and observe and recommend outpatient follow-up for her sarcoidosis.resume dapsone 50 mg daily  Will place on topical hydrocortisone cream to legs as well  Uric acid level is normal

## 2024-05-31 NOTE — ASSESSMENT & PLAN NOTE
"\"- GI Carcinoid tumor, dx 2006 involved small bowel/ duodenum and portal lymph nodes, s/p debulking in 7/2008 could not fully resect and was recieving Sandostatin q4 weeks at [facility] and follow-up 24 hr urine 5-HIAA level and chromogranin A level as well as octreotide scan or Gallium/PET scan  - Transfer care to [facility] in 8/2018. continued on Sandostatin Q 28 days. Overall tolerates treatment well. Continue current treatment    - Carcinoid tumor marker Chromogranin A level has been normal however 5 HIAA has increased. Finally insurance approved Gallium/ PET scan 8/2/2022 report stable disease.\"   -- per recent Heme Onc note from [place]  "

## 2024-05-31 NOTE — PLAN OF CARE
Problem: PAIN - ADULT  Goal: Verbalizes/displays adequate comfort level or baseline comfort level  Description: Interventions:  - Encourage patient to monitor pain and request assistance  - Assess pain using appropriate pain scale  - Administer analgesics based on type and severity of pain and evaluate response  - Implement non-pharmacological measures as appropriate and evaluate response  - Consider cultural and social influences on pain and pain management  - Notify physician/advanced practitioner if interventions unsuccessful or patient reports new pain  Outcome: Progressing     Problem: INFECTION - ADULT  Goal: Absence or prevention of progression during hospitalization  Description: INTERVENTIONS:  - Assess and monitor for signs and symptoms of infection  - Monitor lab/diagnostic results  - Monitor all insertion sites, i.e. indwelling lines, tubes, and drains  - Monitor endotracheal if appropriate and nasal secretions for changes in amount and color  - [place] appropriate cooling/warming therapies per order  - Administer medications as ordered  - Instruct and encourage patient and family to use good hand hygiene technique  - Identify and instruct in appropriate isolation precautions for identified infection/condition  Outcome: Progressing  Goal: Absence of fever/infection during neutropenic period  Description: INTERVENTIONS:  - Monitor WBC    Outcome: Progressing     Problem: SAFETY ADULT  Goal: Patient will remain free of falls  Description: INTERVENTIONS:  - Educate patient/family on patient safety including physical limitations  - Instruct patient to call for assistance with activity   - Consult OT/PT to assist with strengthening/mobility   - Keep Call bell within reach  - Keep bed low and locked with side rails adjusted as appropriate  - Keep care items and personal belongings within reach  - Initiate and maintain comfort rounds  - Make Fall Risk Sign visible to staff  - Offer Toileting every 2 Hours,  in advance of need  - Initiate/Maintain bed alarm  - Obtain necessary fall risk management equipment  - Apply yellow socks and bracelet for high fall risk patients  - Consider moving patient to room near nurses station  Outcome: Progressing  Goal: Maintain or return to baseline ADL function  Description: INTERVENTIONS:  -  Assess patient's ability to carry out ADLs; assess patient's baseline for ADL function and identify physical deficits which impact ability to perform ADLs (bathing, care of mouth/teeth, toileting, grooming, dressing, etc.)  - Assess/evaluate cause of self-care deficits   - Assess range of motion  - Assess patient's mobility; develop plan if impaired  - Assess patient's need for assistive devices and provide as appropriate  - Encourage maximum independence but intervene and supervise when necessary  - Involve family in performance of ADLs  - Assess for home care needs following discharge   - Consider OT consult to assist with ADL evaluation and planning for discharge  - Provide patient education as appropriate  Outcome: Progressing  Goal: Maintains/Returns to pre admission functional level  Description: INTERVENTIONS:  - Perform AM-PAC 6 Click Basic Mobility/ Daily Activity assessment daily.  - Set and communicate daily mobility goal to care team and patient/family/caregiver.   - Collaborate with rehabilitation services on mobility goals if consulted  - Stand patient 3 times a day  - Ambulate patient 3 times a day  - Out of bed to chair 3 times a day   - Out of bed for meals 3 times a day  - Out of bed for toileting  - Record patient progress and toleration of activity level   Outcome: Progressing     Problem: DISCHARGE PLANNING  Goal: Discharge to home or other facility with appropriate resources  Description: INTERVENTIONS:  - Identify barriers to discharge w/patient and caregiver  - Arrange for needed discharge resources and transportation as appropriate  - Identify discharge learning needs  (meds, wound care, etc.)  - Arrange for interpretive services to assist at discharge as needed  - Refer to Case Management Department for coordinating discharge planning if the patient needs post-hospital services based on physician/advanced practitioner order or complex needs related to functional status, cognitive ability, or social support system  Outcome: Progressing     Problem: Knowledge Deficit  Goal: Patient/family/caregiver demonstrates understanding of disease process, treatment plan, medications, and discharge instructions  Description: Complete learning assessment and assess knowledge base.  Interventions:  - Provide teaching at level of understanding  - Provide teaching via preferred learning methods  Outcome: Progressing     Problem: RESPIRATORY - ADULT  Goal: Achieves optimal ventilation and oxygenation  Description: INTERVENTIONS:  - Assess for changes in respiratory status  - Assess for changes in mentation and behavior  - Position to facilitate oxygenation and minimize respiratory effort  - Oxygen administered by appropriate delivery if ordered  - Initiate smoking cessation education as indicated  - Encourage broncho-pulmonary hygiene including cough, deep breathe, Incentive Spirometry  - Assess the need for suctioning and aspirate as needed  - Assess and instruct to report SOB or any respiratory difficulty  - Respiratory Therapy support as indicated  Outcome: Progressing     Problem: METABOLIC, FLUID AND ELECTROLYTES - ADULT  Goal: Electrolytes maintained within normal limits  Description: INTERVENTIONS:  - Monitor labs and assess patient for signs and symptoms of electrolyte imbalances  - Administer electrolyte replacement as ordered  - Monitor response to electrolyte replacements, including repeat lab results as appropriate  - Instruct patient on fluid and nutrition as appropriate  Outcome: Progressing  Goal: Fluid balance maintained  Description: INTERVENTIONS:  - Monitor labs   - Monitor I/O  and WT  - Instruct patient on fluid and nutrition as appropriate  - Assess for signs & symptoms of volume excess or deficit  Outcome: Progressing  Goal: Glucose maintained within target range  Description: INTERVENTIONS:  - Monitor Blood Glucose as ordered  - Assess for signs and symptoms of hyperglycemia and hypoglycemia  - Administer ordered medications to maintain glucose within target range  - Assess nutritional intake and initiate nutrition service referral as needed  Outcome: Progressing     Problem: Nutrition/Hydration-ADULT  Goal: Nutrient/Hydration intake appropriate for improving, restoring or maintaining nutritional needs  Description: Monitor and assess patient's nutrition/hydration status for malnutrition. Collaborate with interdisciplinary team and initiate plan and interventions as ordered.  Monitor patient's weight and dietary intake as ordered or per policy. Utilize nutrition screening tool and intervene as necessary. Determine patient's food preferences and provide high-protein, high-caloric foods as appropriate.     INTERVENTIONS:  - Monitor oral intake, urinary output, labs, and treatment plans  - Assess nutrition and hydration status and recommend course of action  - Evaluate amount of meals eaten  - Assist patient with eating if necessary   - Allow adequate time for meals  - Recommend/ encourage appropriate diets, oral nutritional supplements, and vitamin/mineral supplements  - Order, calculate, and assess calorie counts as needed  - Recommend, monitor, and adjust tube feedings and TPN/PPN based on assessed needs  - Assess need for intravenous fluids  - Provide specific nutrition/hydration education as appropriate  - Include patient/family/caregiver in decisions related to nutrition  Outcome: Progressing     Problem: Prexisting or High Potential for Compromised Skin Integrity  Goal: Skin integrity is maintained or improved  Description: INTERVENTIONS:  - Identify patients at risk for skin  breakdown  - Assess and monitor skin integrity  - Assess and monitor nutrition and hydration status  - Monitor labs   - Assess for incontinence   - Turn and reposition patient  - Assist with mobility/ambulation  - Relieve pressure over bony prominences  - Avoid friction and shearing  - Provide appropriate hygiene as needed including keeping skin clean and dry  - Evaluate need for skin moisturizer/barrier cream  - Collaborate with interdisciplinary team   - Patient/family teaching  - Consider wound care consult   Outcome: Progressing     Problem: CARDIOVASCULAR - ADULT  Goal: Maintains optimal cardiac output and hemodynamic stability  Description: INTERVENTIONS:  - Monitor I/O, vital signs and rhythm  - Monitor for S/S and trends of decreased cardiac output  - Administer and titrate ordered vasoactive medications to optimize hemodynamic stability  - Assess quality of pulses, skin color and temperature  - Assess for signs of decreased coronary artery perfusion  - Instruct patient to report change in severity of symptoms  Outcome: Progressing  Goal: Absence of cardiac dysrhythmias or at baseline rhythm  Description: INTERVENTIONS:  - Continuous cardiac monitoring, vital signs, obtain 12 lead EKG if ordered  - Administer antiarrhythmic and heart rate control medications as ordered  - Monitor electrolytes and administer replacement therapy as ordered  Outcome: Progressing

## 2024-05-31 NOTE — PROGRESS NOTES
"Progress Note - Pulmonary   Bailey Olsen 53 y.o. female MRN: [id number]  Unit/Bed#: -01 Encounter: [id number]    Assessment/Plan:    Abnormal CT chest secondary to bilateral focal areas of consolidation and groundglass opacities  Acute on chronic diastolic CHF  Hx of GI carcinoid tumor  Cirrhosis  Hx of Sarcoidosis without lung involvement  Pancytopenia  Morbid obesity     Pulmonary recommendations:     Reviewed overnight pulse ox.  Patient did not spend a significant amount of time with SpO2 below 88%.  Require supplemental oxygen at night but would probably benefit from an outpatient sleep study.  Maintain saturations greater than 88%.   Pulmonary toilet:  Increase activity as tolerated, out of bed as tolerated, cough and deep breathing exercises encourage, incentive spirometry q.1 hour  Continue diuresis per primary team-currently on Lasix 80 mg IV twice daily  Continue empiric ABX wit CTX. She has completed 5 day course which is adequate from a pulmonary standpoint. Would discontinue now from a pulmonary standpoint.   Note 4+ growth of Candida albicans on sputum culture. Suspect this is contaminant without evidence of positive blood cultures, clinical deterioration, etc.   No indication for steroids from a pulmonary standpoint.   Repeat CT Chest wo contrast in 8 weeks to confirm resolution.   If she clinically worsens could consider bronchoscopy  Follow up with outpatient heme/onc specialist.     Patient is stable from a pulmonary standpoint. Will sign off. Call with questions.     Chief Complaint:    \"I not 100% yet.\"    Subjective:    Patient was seen and examined today.  No overnight events reported.  Patient states that she feels significantly improved compared to when she first got here but not yet back to baseline.  She still struggles with walking to and from the bathroom.  Denies significant cough or sputum production.  Denies wheezing anymore.     Objective:    Vitals: Blood pressure 132/75, " "pulse 77, temperature 98.1 °F (36.7 °C), resp. rate 18, height 5' 5\" (1.651 m), weight 123 kg (271 lb 2.7 oz), SpO2 98%.room air,Body mass index is 45.12 kg/m².      Intake/Output Summary (Last 24 hours) at 5/31/2024 1502  Last data filed at 5/31/2024 1314  Gross per 24 hour   Intake 820 ml   Output 5350 ml   Net -4530 ml       Invasive Devices       Peripheral Intravenous Line  Duration             Peripheral IV 05/30/24 Distal;Dorsal (posterior);Left Forearm 1 day                    Physical Exam:     Physical Exam  Vitals and nursing note reviewed.   Constitutional:       General: She is not in acute distress.     Appearance: Normal appearance. She is obese.   HENT:      Head: Normocephalic and atraumatic.      Mouth/Throat:      Mouth: Mucous membranes are moist.      Pharynx: Oropharynx is clear.   Cardiovascular:      Rate and Rhythm: Normal rate and regular rhythm.      Heart sounds: No murmur heard.  Pulmonary:      Effort: Pulmonary effort is normal.      Breath sounds: No wheezing or rhonchi.   Musculoskeletal:      Cervical back: Normal range of motion.   Skin:     General: Skin is warm and dry.   Neurological:      General: No focal deficit present.      Mental Status: She is alert. Mental status is at baseline.   Psychiatric:         Mood and Affect: Mood normal.         Behavior: Behavior normal.         Labs: I have personally reviewed pertinent lab results., ABG: No results found for: \"PHART\", \"CYR8KHX\", \"PO2ART\", \"VLG2MGM\", \"A3PIXJJR\", \"BEART\", \"SOURCE\", BNP: No results found for: \"BNP\", CBC: No results found for: \"WBC\", \"HGB\", \"HCT\", \"MCV\", \"PLT\", \"ADJUSTEDWBC\", \"RBC\", \"MCH\", \"MCHC\", \"RDW\", \"MPV\", \"NRBC\", CMP:   Lab Results   Component Value Date    SODIUM 136 05/31/2024    K 4.4 05/31/2024     05/31/2024    CO2 33 (H) 05/31/2024    BUN 14 05/31/2024    CREATININE 0.69 05/31/2024    CALCIUM 8.7 05/31/2024    EGFR 99 05/31/2024   , PT/INR: No results found for: \"PT\", \"INR\", Troponin: No " "results found for: \"TROPONINI\"    Imaging and other studies: I have personally reviewed pertinent reports.   and I have personally reviewed pertinent films in PACS    "

## 2024-05-31 NOTE — PLAN OF CARE
Problem: PAIN - ADULT  Goal: Verbalizes/displays adequate comfort level or baseline comfort level  Description: Interventions:  - Encourage patient to monitor pain and request assistance  - Assess pain using appropriate pain scale  - Administer analgesics based on type and severity of pain and evaluate response  - Implement non-pharmacological measures as appropriate and evaluate response  - Consider cultural and social influences on pain and pain management  - Notify physician/advanced practitioner if interventions unsuccessful or patient reports new pain  Outcome: Progressing     Problem: INFECTION - ADULT  Goal: Absence or prevention of progression during hospitalization  Description: INTERVENTIONS:  - Assess and monitor for signs and symptoms of infection  - Monitor lab/diagnostic results  - Monitor all insertion sites, i.e. indwelling lines, tubes, and drains  - Monitor endotracheal if appropriate and nasal secretions for changes in amount and color  - [place] appropriate cooling/warming therapies per order  - Administer medications as ordered  - Instruct and encourage patient and family to use good hand hygiene technique  - Identify and instruct in appropriate isolation precautions for identified infection/condition  Outcome: Progressing  Goal: Absence of fever/infection during neutropenic period  Description: INTERVENTIONS:  - Monitor WBC    Outcome: Progressing     Problem: SAFETY ADULT  Goal: Patient will remain free of falls  Description: INTERVENTIONS:  - Educate patient/family on patient safety including physical limitations  - Instruct patient to call for assistance with activity   - Consult OT/PT to assist with strengthening/mobility   - Keep Call bell within reach  - Keep bed low and locked with side rails adjusted as appropriate  - Keep care items and personal belongings within reach  - Initiate and maintain comfort rounds  - Make Fall Risk Sign visible to staff  - Offer Toileting every    Hours,  in advance of need  - Initiate/Maintain   alarm  - Obtain necessary fall risk management equipment:     - Apply yellow socks and bracelet for high fall risk patients  - Consider moving patient to room near nurses station  Outcome: Progressing  Goal: Maintain or return to baseline ADL function  Description: INTERVENTIONS:  -  Assess patient's ability to carry out ADLs; assess patient's baseline for ADL function and identify physical deficits which impact ability to perform ADLs (bathing, care of mouth/teeth, toileting, grooming, dressing, etc.)  - Assess/evaluate cause of self-care deficits   - Assess range of motion  - Assess patient's mobility; develop plan if impaired  - Assess patient's need for assistive devices and provide as appropriate  - Encourage maximum independence but intervene and supervise when necessary  - Involve family in performance of ADLs  - Assess for home care needs following discharge   - Consider OT consult to assist with ADL evaluation and planning for discharge  - Provide patient education as appropriate  Outcome: Progressing  Goal: Maintains/Returns to pre admission functional level  Description: INTERVENTIONS:  - Perform AM-PAC 6 Click Basic Mobility/ Daily Activity assessment daily.  - Set and communicate daily mobility goal to care team and patient/family/caregiver.   - Collaborate with rehabilitation services on mobility goals if consulted  - Perform Range of Motion    times a day.  - Reposition patient every    hours.  - Dangle patient    times a day  - Stand patient    times a day  - Ambulate patient    times a day  - Out of bed to chair    times a day   - Out of bed for meals            times a day  - Out of bed for toileting  - Record patient progress and toleration of activity level   Outcome: Progressing     Problem: DISCHARGE PLANNING  Goal: Discharge to home or other facility with appropriate resources  Description: INTERVENTIONS:  - Identify barriers to discharge w/patient and  caregiver  - Arrange for needed discharge resources and transportation as appropriate  - Identify discharge learning needs (meds, wound care, etc.)  - Arrange for interpretive services to assist at discharge as needed  - Refer to Case Management Department for coordinating discharge planning if the patient needs post-hospital services based on physician/advanced practitioner order or complex needs related to functional status, cognitive ability, or social support system  Outcome: Progressing     Problem: Knowledge Deficit  Goal: Patient/family/caregiver demonstrates understanding of disease process, treatment plan, medications, and discharge instructions  Description: Complete learning assessment and assess knowledge base.  Interventions:  - Provide teaching at level of understanding  - Provide teaching via preferred learning methods  Outcome: Progressing     Problem: RESPIRATORY - ADULT  Goal: Achieves optimal ventilation and oxygenation  Description: INTERVENTIONS:  - Assess for changes in respiratory status  - Assess for changes in mentation and behavior  - Position to facilitate oxygenation and minimize respiratory effort  - Oxygen administered by appropriate delivery if ordered  - Initiate smoking cessation education as indicated  - Encourage broncho-pulmonary hygiene including cough, deep breathe, Incentive Spirometry  - Assess the need for suctioning and aspirate as needed  - Assess and instruct to report SOB or any respiratory difficulty  - Respiratory Therapy support as indicated  Outcome: Progressing     Problem: METABOLIC, FLUID AND ELECTROLYTES - ADULT  Goal: Electrolytes maintained within normal limits  Description: INTERVENTIONS:  - Monitor labs and assess patient for signs and symptoms of electrolyte imbalances  - Administer electrolyte replacement as ordered  - Monitor response to electrolyte replacements, including repeat lab results as appropriate  - Instruct patient on fluid and nutrition as  appropriate  Outcome: Progressing  Goal: Fluid balance maintained  Description: INTERVENTIONS:  - Monitor labs   - Monitor I/O and WT  - Instruct patient on fluid and nutrition as appropriate  - Assess for signs & symptoms of volume excess or deficit  Outcome: Progressing  Goal: Glucose maintained within target range  Description: INTERVENTIONS:  - Monitor Blood Glucose as ordered  - Assess for signs and symptoms of hyperglycemia and hypoglycemia  - Administer ordered medications to maintain glucose within target range  - Assess nutritional intake and initiate nutrition service referral as needed  Outcome: Progressing     Problem: Nutrition/Hydration-ADULT  Goal: Nutrient/Hydration intake appropriate for improving, restoring or maintaining nutritional needs  Description: Monitor and assess patient's nutrition/hydration status for malnutrition. Collaborate with interdisciplinary team and initiate plan and interventions as ordered.  Monitor patient's weight and dietary intake as ordered or per policy. Utilize nutrition screening tool and intervene as necessary. Determine patient's food preferences and provide high-protein, high-caloric foods as appropriate.     INTERVENTIONS:  - Monitor oral intake, urinary output, labs, and treatment plans  - Assess nutrition and hydration status and recommend course of action  - Evaluate amount of meals eaten  - Assist patient with eating if necessary   - Allow adequate time for meals  - Recommend/ encourage appropriate diets, oral nutritional supplements, and vitamin/mineral supplements  - Order, calculate, and assess calorie counts as needed  - Recommend, monitor, and adjust tube feedings and TPN/PPN based on assessed needs  - Assess need for intravenous fluids  - Provide specific nutrition/hydration education as appropriate  - Include patient/family/caregiver in decisions related to nutrition  Outcome: Progressing     Problem: Prexisting or High Potential for Compromised Skin  Integrity  Goal: Skin integrity is maintained or improved  Description: INTERVENTIONS:  - Identify patients at risk for skin breakdown  - Assess and monitor skin integrity  - Assess and monitor nutrition and hydration status  - Monitor labs   - Assess for incontinence   - Turn and reposition patient  - Assist with mobility/ambulation  - Relieve pressure over bony prominences  - Avoid friction and shearing  - Provide appropriate hygiene as needed including keeping skin clean and dry  - Evaluate need for skin moisturizer/barrier cream  - Collaborate with interdisciplinary team   - Patient/family teaching  - Consider wound care consult   Outcome: Progressing     Problem: CARDIOVASCULAR - ADULT  Goal: Maintains optimal cardiac output and hemodynamic stability  Description: INTERVENTIONS:  - Monitor I/O, vital signs and rhythm  - Monitor for S/S and trends of decreased cardiac output  - Administer and titrate ordered vasoactive medications to optimize hemodynamic stability  - Assess quality of pulses, skin color and temperature  - Assess for signs of decreased coronary artery perfusion  - Instruct patient to report change in severity of symptoms  Outcome: Progressing  Goal: Absence of cardiac dysrhythmias or at baseline rhythm  Description: INTERVENTIONS:  - Continuous cardiac monitoring, vital signs, obtain 12 lead EKG if ordered  - Administer antiarrhythmic and heart rate control medications as ordered  - Monitor electrolytes and administer replacement therapy as ordered  Outcome: Progressing

## 2024-06-01 LAB
ANION GAP SERPL CALCULATED.3IONS-SCNC: 5 MMOL/L (ref 4–13)
BUN SERPL-MCNC: 15 MG/DL (ref 5–25)
CALCIUM SERPL-MCNC: 8.3 MG/DL (ref 8.4–10.2)
CHLORIDE SERPL-SCNC: 102 MMOL/L (ref 96–108)
CO2 SERPL-SCNC: 29 MMOL/L (ref 21–32)
CREAT SERPL-MCNC: 0.68 MG/DL (ref 0.6–1.3)
GFR SERPL CREATININE-BSD FRML MDRD: 99 ML/MIN/1.73SQ M
GLUCOSE SERPL-MCNC: 110 MG/DL (ref 65–140)
MAGNESIUM SERPL-MCNC: 2.3 MG/DL (ref 1.9–2.7)
POTASSIUM SERPL-SCNC: 4.3 MMOL/L (ref 3.5–5.3)
SODIUM SERPL-SCNC: 136 MMOL/L (ref 135–147)

## 2024-06-01 PROCEDURE — 83735 ASSAY OF MAGNESIUM: CPT | Performed by: FAMILY MEDICINE

## 2024-06-01 PROCEDURE — 80048 BASIC METABOLIC PNL TOTAL CA: CPT | Performed by: FAMILY MEDICINE

## 2024-06-01 PROCEDURE — 99232 SBSQ HOSP IP/OBS MODERATE 35: CPT | Performed by: FAMILY MEDICINE

## 2024-06-01 RX ORDER — SPIRONOLACTONE 25 MG/1
50 TABLET ORAL DAILY
Status: DISCONTINUED | OUTPATIENT
Start: 2024-06-02 | End: 2024-06-02 | Stop reason: HOSPADM

## 2024-06-01 RX ORDER — SPIRONOLACTONE 25 MG/1
25 TABLET ORAL ONCE
Status: COMPLETED | OUTPATIENT
Start: 2024-06-01 | End: 2024-06-01

## 2024-06-01 RX ORDER — TORSEMIDE 20 MG/1
20 TABLET ORAL
Status: DISCONTINUED | OUTPATIENT
Start: 2024-06-01 | End: 2024-06-02 | Stop reason: HOSPADM

## 2024-06-01 RX ADMIN — HYDROXYCHLOROQUINE SULFATE 200 MG: 200 TABLET, FILM COATED ORAL at 16:10

## 2024-06-01 RX ADMIN — SPIRONOLACTONE 25 MG: 25 TABLET ORAL at 16:10

## 2024-06-01 RX ADMIN — DAPSONE 50 MG: 25 TABLET ORAL at 08:06

## 2024-06-01 RX ADMIN — AMITRIPTYLINE HYDROCHLORIDE 75 MG: 25 TABLET, FILM COATED ORAL at 20:36

## 2024-06-01 RX ADMIN — LIDOCAINE 5% 1 PATCH: 700 PATCH TOPICAL at 16:09

## 2024-06-01 RX ADMIN — OXYCODONE HYDROCHLORIDE 10 MG: 10 TABLET ORAL at 07:18

## 2024-06-01 RX ADMIN — ACETAMINOPHEN 325MG 650 MG: 325 TABLET ORAL at 16:11

## 2024-06-01 RX ADMIN — GABAPENTIN 100 MG: 100 CAPSULE ORAL at 08:05

## 2024-06-01 RX ADMIN — TORSEMIDE 20 MG: 20 TABLET ORAL at 16:10

## 2024-06-01 RX ADMIN — GABAPENTIN 100 MG: 100 CAPSULE ORAL at 17:50

## 2024-06-01 RX ADMIN — HYDROXYCHLOROQUINE SULFATE 200 MG: 200 TABLET, FILM COATED ORAL at 08:05

## 2024-06-01 RX ADMIN — PREDNISONE 40 MG: 20 TABLET ORAL at 08:05

## 2024-06-01 RX ADMIN — ACETAMINOPHEN 325MG 650 MG: 325 TABLET ORAL at 08:07

## 2024-06-01 RX ADMIN — SPIRONOLACTONE 25 MG: 25 TABLET ORAL at 08:05

## 2024-06-01 RX ADMIN — RIFAXIMIN 550 MG: 550 TABLET ORAL at 17:50

## 2024-06-01 RX ADMIN — OXYCODONE HYDROCHLORIDE 10 MG: 10 TABLET ORAL at 13:15

## 2024-06-01 RX ADMIN — FUROSEMIDE 80 MG: 10 INJECTION, SOLUTION INTRAMUSCULAR; INTRAVENOUS at 08:05

## 2024-06-01 RX ADMIN — RIFAXIMIN 550 MG: 550 TABLET ORAL at 08:05

## 2024-06-01 RX ADMIN — SERTRALINE 100 MG: 100 TABLET, FILM COATED ORAL at 08:05

## 2024-06-01 RX ADMIN — OXYCODONE HYDROCHLORIDE 10 MG: 10 TABLET ORAL at 19:19

## 2024-06-01 NOTE — PLAN OF CARE
Problem: PAIN - ADULT  Goal: Verbalizes/displays adequate comfort level or baseline comfort level  Description: Interventions:  - Encourage patient to monitor pain and request assistance  - Assess pain using appropriate pain scale  - Administer analgesics based on type and severity of pain and evaluate response  - Implement non-pharmacological measures as appropriate and evaluate response  - Consider cultural and social influences on pain and pain management  - Notify physician/advanced practitioner if interventions unsuccessful or patient reports new pain  Outcome: Progressing     Problem: INFECTION - ADULT  Goal: Absence or prevention of progression during hospitalization  Description: INTERVENTIONS:  - Assess and monitor for signs and symptoms of infection  - Monitor lab/diagnostic results  - Monitor all insertion sites, i.e. indwelling lines, tubes, and drains  - Monitor endotracheal if appropriate and nasal secretions for changes in amount and color  - [place] appropriate cooling/warming therapies per order  - Administer medications as ordered  - Instruct and encourage patient and family to use good hand hygiene technique  - Identify and instruct in appropriate isolation precautions for identified infection/condition  Outcome: Progressing     Problem: RESPIRATORY - ADULT  Goal: Achieves optimal ventilation and oxygenation  Description: INTERVENTIONS:  - Assess for changes in respiratory status  - Assess for changes in mentation and behavior  - Position to facilitate oxygenation and minimize respiratory effort  - Oxygen administered by appropriate delivery if ordered  - Initiate smoking cessation education as indicated  - Encourage broncho-pulmonary hygiene including cough, deep breathe, Incentive Spirometry  - Assess the need for suctioning and aspirate as needed  - Assess and instruct to report SOB or any respiratory difficulty  - Respiratory Therapy support as indicated  Outcome: Progressing     Problem:  METABOLIC, FLUID AND ELECTROLYTES - ADULT  Goal: Electrolytes maintained within normal limits  Description: INTERVENTIONS:  - Monitor labs and assess patient for signs and symptoms of electrolyte imbalances  - Administer electrolyte replacement as ordered  - Monitor response to electrolyte replacements, including repeat lab results as appropriate  - Instruct patient on fluid and nutrition as appropriate  Outcome: Progressing     Problem: CARDIOVASCULAR - ADULT  Goal: Maintains optimal cardiac output and hemodynamic stability  Description: INTERVENTIONS:  - Monitor I/O, vital signs and rhythm  - Monitor for S/S and trends of decreased cardiac output  - Administer and titrate ordered vasoactive medications to optimize hemodynamic stability  - Assess quality of pulses, skin color and temperature  - Assess for signs of decreased coronary artery perfusion  - Instruct patient to report change in severity of symptoms  Outcome: Progressing

## 2024-06-01 NOTE — ASSESSMENT & PLAN NOTE
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 continued on Sandostatin Q 28 days. Overall tolerates treatment well. Continue current treatment    - Carcinoid tumor marker Chromogranin A level has been normal however 5 HIAA has increased. Finally insurance approved Gallium/ PET scan 8/2/2022 report stable disease.\"   -- per recent Heme Onc note from [place]  "

## 2024-06-01 NOTE — PROGRESS NOTES
"[facility]  Progress Note  Name: Bailey Olsen I  MRN: [id number]  Unit/Bed#: -01 I Date of Admission: 5/26/2024   Date of Service: 6/1/2024 I Hospital Day: 6    Assessment & Plan   * Bilateral pulmonary infiltrates  Assessment & Plan  53-year-old female with history of cirrhosis, carcinoid tumor who initially presented with shortness of breath peripheral edema  CTA revealed \"Bilateral multi lobar consolidation with evidence of interstitial edema, and small bilateral pleural effusions. Findings may represent multifocal pneumonia (infectious or organizing pneumonia) with superimposed vascular congestion. Pulmonary hemorrhage\" . Neg for pulm embolism  Currently afebrile, WBC count within normal limits  Pro-Ernesto within normal limits placed on ceftriaxone and complete 5 doses.last dose today  Consolidations may be secondary to CHF  Will provide IV Lasix as outlined below   pulmonology consult for further evaluation due to pancytopenia and carcinoid tumor history.  Repeat CT scan of the chest in 8 weeks or earlier if she develops worsening respiratory symptoms. If she worsens she will most likely need bronchoscopy and bronchoalveolar lavage.     Acute on chronic diastolic (congestive) heart failure (HCC)  Assessment & Plan  Wt Readings from Last 3 Encounters:   06/01/24 121 kg (267 lb)     Presented with shortness of breath with exertion and peripheral edema  CT scan revealed pulmonary edema  patient has lost 27 pounds since admission increase Lasix from 40 mg-60 mg - 80 IV twice daily and also add low-dose Aldactone due to history of liver cirrhosis.  Stop IV Lasix today transition to torsemide 20 mg oral twice daily and also increase Aldactone to 50 mg daily and observe response  2 d echo normal lv function  Renal function stable so far.hold lisinopril to cont diuresis and avoid hypotension  overnight oxygen requirement study done and does not qualify for nocturnal oxygen.recommend " "sleep study          Sarcoidosis  Assessment & Plan  History of sarcoidosis of the skin, complaining of a lot of itching swelling and discomfort in her legs.  Also has elevated inflammatory markers  Venous Doppler bilateral lower extremity negative for DVT  Will place on a short course of prednisone 40 mg daily for 3  days (completed) and observe and recommend outpatient follow-up for her sarcoidosis.resume dapsone 50 mg daily  Will place on topical hydrocortisone cream to legs as well  Uric acid level is normal    Pancytopenia (HCC)  Assessment & Plan  Chronic, suspected to be from splenomegaly and liver cirrhosis  [facility] with [facility]  Cell counts on admission appear stable from most recent outpatient labs in early May  trend    Iron deficiency anemia  Assessment & Plan  History gastric bypass  [facility]s with [facility]  Currently Hgb around baseline (10.4 on outpatient labs early may)      Anxiety  Assessment & Plan  Anxiety/Depression  Continue amitryptiline.stable    Class 3 severe obesity due to excess calories in adult (HCC)  Assessment & Plan  Dietary and lifestyle changes    History of malignant carcinoid tumor of small intestine  Assessment & Plan  \"- GI Carcinoid tumor, dx 2006 involved small bowel/ duodenum and portal lymph nodes, s/p debulking in 7/2008 could not fully resect and was recieving Sandostatin q4 weeks at [facility] and follow-up 24 hr urine 5-HIAA level and chromogranin A level as well as octreotide scan or Gallium/PET scan  - Transfer care to [facility] in 8/2018. continued on Sandostatin Q 28 days. Overall tolerates treatment well. Continue current treatment    - Carcinoid tumor marker Chromogranin A level has been normal however 5 HIAA has increased. Finally insurance approved Gallium/ PET scan 8/2/2022 report stable disease.\"   -- per recent Heme Onc note from [place]    Other cirrhosis of liver (HCC)  Assessment & Plan  Due to NAFLD  Hx HE  3/19/24 EGD with Grade 1 " Varices  LFTs remain stable  -continue Xifaxin, lactulose, and lasix               VTE Pharmacologic Prophylaxis: VTE Score: 4 Moderate Risk (Score 3-4) - Pharmacological DVT Prophylaxis Contraindicated. Sequential Compression Devices Ordered.    Mobility:   Basic Mobility Inpatient Raw Score: 23  JH-HLM Goal: 7: Walk 25 feet or more  JH-HLM Achieved: 7: Walk 25 feet or more  JH-HLM Goal achieved. Continue to encourage appropriate mobility.    Patient Centered Rounds: I performed bedside rounds with nursing staff today.   Discussions with Specialists or Other Care Team Provider: none    Education and Discussions with Family / Patient:     Total Time Spent on Date of Encounter in care of patient: 35 mins. This time was spent on one or more of the following: performing physical exam; counseling and coordination of care; obtaining or reviewing history; documenting in the medical record; reviewing/ordering tests, medications or procedures; communicating with other healthcare professionals and discussing with patient's family/caregivers.    Current Length of Stay: 6 day(s)  Current Patient Status: Inpatient   Certification Statement: The patient will continue to require additional inpatient hospital stay due to CHF  Discharge Plan: Anticipate discharge tomorrow to home.    Code Status: Level 1 - Full Code    Subjective:   Patient states that she is feeling better her swelling is decreasing her leg pain is improving.    Objective:     Vitals:   Temp (24hrs), Av.1 °F (36.7 °C), Min:98.1 °F (36.7 °C), Max:98.1 °F (36.7 °C)    Temp:  [98.1 °F (36.7 °C)] 98.1 °F (36.7 °C)  HR:  [85-86] 85  Resp:  [18] 18  BP: (106-142)/(64-79) 131/74  SpO2:  [91 %-97 %] 92 %  Body mass index is 44.43 kg/m².     Input and Output Summary (last 24 hours):     Intake/Output Summary (Last 24 hours) at 2024 0954  Last data filed at 2024 0954  Gross per 24 hour   Intake 300 ml   Output 4000 ml   Net -3700 ml       Physical Exam:    Physical Exam  Vitals and nursing note reviewed.   Constitutional:       Appearance: Normal appearance.   HENT:      Head: Normocephalic and atraumatic.      Right Ear: External ear normal.      Left Ear: External ear normal.      Nose: Nose normal.      Mouth/Throat:      Pharynx: Oropharynx is clear.   Cardiovascular:      Rate and Rhythm: Normal rate and regular rhythm.      Heart sounds: Normal heart sounds.   Pulmonary:      Effort: Pulmonary effort is normal.      Breath sounds: Normal breath sounds.   Abdominal:      General: Bowel sounds are normal.      Palpations: Abdomen is soft.      Tenderness: There is no abdominal tenderness.   Musculoskeletal:         General: Normal range of motion.      Cervical back: Normal range of motion and neck supple.      Right lower leg: Edema present.      Left lower leg: Edema present.   Skin:     General: Skin is warm and dry.      Capillary Refill: Capillary refill takes less than 2 seconds.   Neurological:      General: No focal deficit present.      Mental Status: She is alert and oriented to person, place, and time.   Psychiatric:         Mood and Affect: Mood normal.            Additional Data:     Labs:  Results from last 7 days   Lab Units 05/28/24  0449 05/27/24  0600   WBC Thousand/uL 2.76* 2.38*   HEMOGLOBIN g/dL 10.3* 10.2*   HEMATOCRIT % 32.3* 32.1*   PLATELETS Thousands/uL 75* 62*   SEGS PCT %  --  61   LYMPHO PCT %  --  27   MONO PCT %  --  7   EOS PCT %  --  3     Results from last 7 days   Lab Units 06/01/24  0451 05/29/24  0526 05/28/24  0449   SODIUM mmol/L 136   < > 138   POTASSIUM mmol/L 4.3   < > 3.8   CHLORIDE mmol/L 102   < > 105   CO2 mmol/L 29   < > 29   BUN mg/dL 15   < > 12   CREATININE mg/dL 0.68   < > 0.66   ANION GAP mmol/L 5   < > 4   CALCIUM mg/dL 8.3*   < > 8.0*   ALBUMIN g/dL  --   --  3.2*   TOTAL BILIRUBIN mg/dL  --   --  1.28*   ALK PHOS U/L  --   --  171*   ALT U/L  --   --  10   AST U/L  --   --  27   GLUCOSE RANDOM mg/dL 110   <  > 117    < > = values in this interval not displayed.     Results from last 7 days   Lab Units 05/26/24  1446   INR  1.27*             Results from last 7 days   Lab Units 05/27/24  0600 05/26/24  1446   LACTIC ACID mmol/L  --  1.8   PROCALCITONIN ng/ml <0.05 <0.05       Lines/Drains:  Invasive Devices       Peripheral Intravenous Line  Duration             Peripheral IV 05/30/24 Distal;Dorsal (posterior);Left Forearm 2 days                          Imaging: Reviewed radiology reports from this admission including: chest xray    Recent Cultures (last 7 days):   Results from last 7 days   Lab Units 05/29/24  1058 05/26/24  1634 05/26/24  1630   BLOOD CULTURE   --  No Growth After 5 Days. No Growth After 5 Days.   SPUTUM CULTURE  4+ Growth of Candida albicans*  --   --    GRAM STAIN RESULT  2+ Polys*  1+ Budding yeast*  --   --        Last 24 Hours Medication List:   Current Facility-Administered Medications   Medication Dose Route Frequency Provider Last Rate    acetaminophen  650 mg Oral Q6H PRN Angel Cordero MD      amitriptyline  75 mg Oral HS Angel Cordero MD      benzonatate  100 mg Oral TID PRN Angel Cordero MD      dapsone  50 mg Oral Daily Idalia Stuart MD      gabapentin  100 mg Oral BID Idalia Stuart MD      hydrocortisone   Topical 4x Daily PRN Idalia Stuart MD      hydroxychloroquine  200 mg Oral BID With Meals Angel Cordero MD      ipratropium-albuterol  3 mL Nebulization Q6H PRN Angel Cordero MD      lactulose  10 g Oral BID PRN Idalia Stuart MD      lidocaine  1 patch Topical Daily Idalia Stuart MD      oxyCODONE  10 mg Oral Q4H PRN Angel Cordero MD      polyethylene glycol  17 g Oral Daily PRN Idalia Stuart MD      rifaximin  550 mg Oral BID Angel Cordero MD      sertraline  100 mg Oral Daily Idalia Stuart MD      spironolactone  25 mg Oral Once Idalia Stuart MD      [START ON 6/2/2024] spironolactone  50 mg Oral Daily Idalia Stuart MD      torsemide  20 mg Oral BID AC Idalia Stuart MD          Today, Patient  Was Seen By: Idalia Stuart MD    **Please Note: This note may have been constructed using a voice recognition system.**

## 2024-06-01 NOTE — ASSESSMENT & PLAN NOTE
History of sarcoidosis of the skin, complaining of a lot of itching swelling and discomfort in her legs.  Also has elevated inflammatory markers  Venous Doppler bilateral lower extremity negative for DVT  Will place on a short course of prednisone 40 mg daily for 3  days (completed) and observe and recommend outpatient follow-up for her sarcoidosis.resume dapsone 50 mg daily  Will place on topical hydrocortisone cream to legs as well  Uric acid level is normal

## 2024-06-01 NOTE — ASSESSMENT & PLAN NOTE
Wt Readings from Last 3 Encounters:   06/01/24 121 kg (267 lb)     Presented with shortness of breath with exertion and peripheral edema  CT scan revealed pulmonary edema  patient has lost 27 pounds since admission increase Lasix from 40 mg-60 mg - 80 IV twice daily and also add low-dose Aldactone due to history of liver cirrhosis.  Stop IV Lasix today transition to torsemide 20 mg oral twice daily and also increase Aldactone to 50 mg daily and observe response  2 d echo normal lv function  Renal function stable so far.hold lisinopril to cont diuresis and avoid hypotension  overnight oxygen requirement study done and does not qualify for nocturnal oxygen.recommend sleep study

## 2024-06-01 NOTE — PLAN OF CARE
Problem: PAIN - ADULT  Goal: Verbalizes/displays adequate comfort level or baseline comfort level  Description: Interventions:  - Encourage patient to monitor pain and request assistance  - Assess pain using appropriate pain scale  - Administer analgesics based on type and severity of pain and evaluate response  - Implement non-pharmacological measures as appropriate and evaluate response  - Consider cultural and social influences on pain and pain management  - Notify physician/advanced practitioner if interventions unsuccessful or patient reports new pain  Outcome: Progressing     Problem: INFECTION - ADULT  Goal: Absence or prevention of progression during hospitalization  Description: INTERVENTIONS:  - Assess and monitor for signs and symptoms of infection  - Monitor lab/diagnostic results  - Monitor all insertion sites, i.e. indwelling lines, tubes, and drains  - Monitor endotracheal if appropriate and nasal secretions for changes in amount and color  - [place] appropriate cooling/warming therapies per order  - Administer medications as ordered  - Instruct and encourage patient and family to use good hand hygiene technique  - Identify and instruct in appropriate isolation precautions for identified infection/condition  Outcome: Progressing  Goal: Absence of fever/infection during neutropenic period  Description: INTERVENTIONS:  - Monitor WBC    Outcome: Progressing     Problem: SAFETY ADULT  Goal: Patient will remain free of falls  Description: INTERVENTIONS:  - Educate patient/family on patient safety including physical limitations  - Instruct patient to call for assistance with activity   - Consult OT/PT to assist with strengthening/mobility   - Keep Call bell within reach  - Keep bed low and locked with side rails adjusted as appropriate  - Keep care items and personal belongings within reach  - Initiate and maintain comfort rounds  - Make Fall Risk Sign visible to staff  - Offer Toileting every 2 Hours,  in advance of need  - Initiate/Maintain alarm  - Obtain necessary fall risk management equipment:   - Apply yellow socks and bracelet for high fall risk patients  - Consider moving patient to room near nurses station  Outcome: Progressing  Goal: Maintain or return to baseline ADL function  Description: INTERVENTIONS:  -  Assess patient's ability to carry out ADLs; assess patient's baseline for ADL function and identify physical deficits which impact ability to perform ADLs (bathing, care of mouth/teeth, toileting, grooming, dressing, etc.)  - Assess/evaluate cause of self-care deficits   - Assess range of motion  - Assess patient's mobility; develop plan if impaired  - Assess patient's need for assistive devices and provide as appropriate  - Encourage maximum independence but intervene and supervise when necessary  - Involve family in performance of ADLs  - Assess for home care needs following discharge   - Consider OT consult to assist with ADL evaluation and planning for discharge  - Provide patient education as appropriate  Outcome: Progressing  Goal: Maintains/Returns to pre admission functional level  Description: INTERVENTIONS:  - Perform AM-PAC 6 Click Basic Mobility/ Daily Activity assessment daily.  - Set and communicate daily mobility goal to care team and patient/family/caregiver.   - Collaborate with rehabilitation services on mobility goals if consulted  - Ambulate patient 3 times a day  - Out of bed to chair 3 times a day   - Out of bed for meals 3 times a day  - Out of bed for toileting  - Record patient progress and toleration of activity level   Outcome: Progressing     Problem: DISCHARGE PLANNING  Goal: Discharge to home or other facility with appropriate resources  Description: INTERVENTIONS:  - Identify barriers to discharge w/patient and caregiver  - Arrange for needed discharge resources and transportation as appropriate  - Identify discharge learning needs (meds, wound care, etc.)  - Arrange  for interpretive services to assist at discharge as needed  - Refer to Case Management Department for coordinating discharge planning if the patient needs post-hospital services based on physician/advanced practitioner order or complex needs related to functional status, cognitive ability, or social support system  Outcome: Progressing     Problem: RESPIRATORY - ADULT  Goal: Achieves optimal ventilation and oxygenation  Description: INTERVENTIONS:  - Assess for changes in respiratory status  - Assess for changes in mentation and behavior  - Position to facilitate oxygenation and minimize respiratory effort  - Oxygen administered by appropriate delivery if ordered  - Initiate smoking cessation education as indicated  - Encourage broncho-pulmonary hygiene including cough, deep breathe, Incentive Spirometry  - Assess the need for suctioning and aspirate as needed  - Assess and instruct to report SOB or any respiratory difficulty  - Respiratory Therapy support as indicated  Outcome: Progressing

## 2024-06-01 NOTE — PLAN OF CARE
Problem: PAIN - ADULT  Goal: Verbalizes/displays adequate comfort level or baseline comfort level  Description: Interventions:  - Encourage patient to monitor pain and request assistance  - Assess pain using appropriate pain scale  - Administer analgesics based on type and severity of pain and evaluate response  - Implement non-pharmacological measures as appropriate and evaluate response  - Consider cultural and social influences on pain and pain management  - Notify physician/advanced practitioner if interventions unsuccessful or patient reports new pain  Outcome: Progressing     Problem: INFECTION - ADULT  Goal: Absence or prevention of progression during hospitalization  Description: INTERVENTIONS:  - Assess and monitor for signs and symptoms of infection  - Monitor lab/diagnostic results  - Monitor all insertion sites, i.e. indwelling lines, tubes, and drains  - Monitor endotracheal if appropriate and nasal secretions for changes in amount and color  - [place] appropriate cooling/warming therapies per order  - Administer medications as ordered  - Instruct and encourage patient and family to use good hand hygiene technique  - Identify and instruct in appropriate isolation precautions for identified infection/condition  Outcome: Progressing     Problem: RESPIRATORY - ADULT  Goal: Achieves optimal ventilation and oxygenation  Description: INTERVENTIONS:  - Assess for changes in respiratory status  - Assess for changes in mentation and behavior  - Position to facilitate oxygenation and minimize respiratory effort  - Oxygen administered by appropriate delivery if ordered  - Initiate smoking cessation education as indicated  - Encourage broncho-pulmonary hygiene including cough, deep breathe, Incentive Spirometry  - Assess the need for suctioning and aspirate as needed  - Assess and instruct to report SOB or any respiratory difficulty  - Respiratory Therapy support as indicated  Outcome: Progressing     Problem:  CARDIOVASCULAR - ADULT  Goal: Maintains optimal cardiac output and hemodynamic stability  Description: INTERVENTIONS:  - Monitor I/O, vital signs and rhythm  - Monitor for S/S and trends of decreased cardiac output  - Administer and titrate ordered vasoactive medications to optimize hemodynamic stability  - Assess quality of pulses, skin color and temperature  - Assess for signs of decreased coronary artery perfusion  - Instruct patient to report change in severity of symptoms  Outcome: Progressing

## 2024-06-02 VITALS
SYSTOLIC BLOOD PRESSURE: 100 MMHG | TEMPERATURE: 97.9 F | HEIGHT: 65 IN | WEIGHT: 263.01 LBS | DIASTOLIC BLOOD PRESSURE: 56 MMHG | BODY MASS INDEX: 43.82 KG/M2 | RESPIRATION RATE: 18 BRPM | OXYGEN SATURATION: 92 % | HEART RATE: 76 BPM

## 2024-06-02 LAB
ANION GAP SERPL CALCULATED.3IONS-SCNC: 6 MMOL/L (ref 4–13)
BUN SERPL-MCNC: 19 MG/DL (ref 5–25)
CALCIUM SERPL-MCNC: 8.4 MG/DL (ref 8.4–10.2)
CHLORIDE SERPL-SCNC: 100 MMOL/L (ref 96–108)
CO2 SERPL-SCNC: 30 MMOL/L (ref 21–32)
CREAT SERPL-MCNC: 0.86 MG/DL (ref 0.6–1.3)
GFR SERPL CREATININE-BSD FRML MDRD: 76 ML/MIN/1.73SQ M
GLUCOSE SERPL-MCNC: 113 MG/DL (ref 65–140)
POTASSIUM SERPL-SCNC: 3.8 MMOL/L (ref 3.5–5.3)
SODIUM SERPL-SCNC: 136 MMOL/L (ref 135–147)

## 2024-06-02 PROCEDURE — 99239 HOSP IP/OBS DSCHRG MGMT >30: CPT | Performed by: FAMILY MEDICINE

## 2024-06-02 PROCEDURE — 80048 BASIC METABOLIC PNL TOTAL CA: CPT | Performed by: FAMILY MEDICINE

## 2024-06-02 RX ORDER — ACETAMINOPHEN 325 MG/1
650 TABLET ORAL EVERY 6 HOURS PRN
Start: 2024-06-02

## 2024-06-02 RX ORDER — DAPSONE 25 MG/1
50 TABLET ORAL DAILY
Start: 2024-06-03 | End: 2024-07-03

## 2024-06-02 RX ORDER — OXYCODONE HYDROCHLORIDE 10 MG/1
10 TABLET ORAL EVERY 6 HOURS PRN
Qty: 40 TABLET | Refills: 0 | Status: SHIPPED | OUTPATIENT
Start: 2024-06-02 | End: 2024-06-12

## 2024-06-02 RX ORDER — BENZOCAINE/MENTHOL 6 MG-10 MG
LOZENGE MUCOUS MEMBRANE 2 TIMES DAILY
Qty: 28 G | Refills: 0 | Status: SHIPPED | OUTPATIENT
Start: 2024-06-02 | End: 2024-07-02

## 2024-06-02 RX ORDER — TORSEMIDE 20 MG/1
20 TABLET ORAL
Qty: 60 TABLET | Refills: 0 | Status: SHIPPED | OUTPATIENT
Start: 2024-06-02 | End: 2024-07-02

## 2024-06-02 RX ORDER — SERTRALINE HYDROCHLORIDE 100 MG/1
100 TABLET, FILM COATED ORAL DAILY
Start: 2024-06-03

## 2024-06-02 RX ORDER — GABAPENTIN 100 MG/1
100 CAPSULE ORAL 2 TIMES DAILY
Qty: 60 CAPSULE | Refills: 0 | Status: SHIPPED | OUTPATIENT
Start: 2024-06-02 | End: 2024-07-02

## 2024-06-02 RX ORDER — SPIRONOLACTONE 50 MG/1
50 TABLET, FILM COATED ORAL DAILY
Qty: 30 TABLET | Refills: 0 | Status: SHIPPED | OUTPATIENT
Start: 2024-06-03 | End: 2024-07-03

## 2024-06-02 RX ADMIN — OXYCODONE HYDROCHLORIDE 10 MG: 10 TABLET ORAL at 08:08

## 2024-06-02 RX ADMIN — TORSEMIDE 20 MG: 20 TABLET ORAL at 06:18

## 2024-06-02 RX ADMIN — GABAPENTIN 100 MG: 100 CAPSULE ORAL at 08:06

## 2024-06-02 RX ADMIN — RIFAXIMIN 550 MG: 550 TABLET ORAL at 08:07

## 2024-06-02 RX ADMIN — OXYCODONE HYDROCHLORIDE 10 MG: 10 TABLET ORAL at 00:27

## 2024-06-02 RX ADMIN — SPIRONOLACTONE 50 MG: 25 TABLET ORAL at 08:07

## 2024-06-02 RX ADMIN — DAPSONE 50 MG: 25 TABLET ORAL at 08:06

## 2024-06-02 RX ADMIN — HYDROXYCHLOROQUINE SULFATE 200 MG: 200 TABLET, FILM COATED ORAL at 08:06

## 2024-06-02 RX ADMIN — SERTRALINE 100 MG: 100 TABLET, FILM COATED ORAL at 08:06

## 2024-06-02 NOTE — ASSESSMENT & PLAN NOTE
Wt Readings from Last 3 Encounters:   06/02/24 119 kg (263 lb 0.1 oz)     Presented with shortness of breath with exertion and peripheral edema  CT scan revealed pulmonary edema  patient has lost 27 pounds since admission increase Lasix from 40 mg-60 mg - 80 IV twice daily and also add low-dose Aldactone due to history of liver cirrhosis.  Stop IV Lasix today transition to torsemide 20 mg oral twice daily and also increase Aldactone to 50 mg daily and observe response  2 d echo normal lv function  Renal function stable so far.hold lisinopril to cont diuresis and avoid hypotension  overnight oxygen requirement study done and does not qualify for nocturnal oxygen.recommend sleep study

## 2024-06-02 NOTE — UTILIZATION REVIEW
Continued Stay Review    Date:     FOR  6/1/24                          Current Patient Class:  INpatient  Current Level of Care:  med surg    HPI:53 y.o. female initially admitted on   5/26     Assessment/Plan:   6/1  IV  lasix  d/c  .    Leg pain  improving.   Swelling  decreasing.  Continue to monitor labs.  O2  sats  adequate  RA>  Completed  LIZY  course.      Vital Signs:              97.7 °F (36.5 °C) 79 18 111/69 83 92 % -- --    06/01/24 1919 -- -- -- -- -- -- None (Room air) --   06/01/24 1500 98.6 °F (37 °C) 89 18 128/70 89 94 % -- Lying   06/01/24 0700 98.1 °F (36.7 °C) 85 18 131/74 93 92 % None (Room air)          Pertinent Labs/Diagnostic Results:   Results from last 7 days   Lab Units 05/26/24  1446   SARS-COV-2  Negative           Results from last 7 days   Lab Units 06/02/24  0443 06/01/24  0451 05/31/24  0507 05/30/24  0512 05/29/24  0526 05/28/24  0449 05/27/24  0600 05/26/24  1446   SODIUM mmol/L 136 136 136 138 135   < > 140 140   POTASSIUM mmol/L 3.8 4.3 4.4 3.9 3.9   < > 3.4* 3.3*   CHLORIDE mmol/L 100 102 101 101 104   < > 104 105   CO2 mmol/L 30 29 33* 32 25   < > 33* 29   ANION GAP mmol/L 6 5 2* 5 6   < > 3* 6   BUN mg/dL 19 15 14 13 14   < > 8 6   CREATININE mg/dL 0.86 0.68 0.69 0.72 0.78   < > 0.62 0.57*   EGFR ml/min/1.73sq m 76 99 99 95 86   < > 102 105   CALCIUM mg/dL 8.4 8.3* 8.7 8.3* 8.2*   < > 8.0* 8.1*   MAGNESIUM mg/dL  --  2.3  --   --  2.1  --  2.2 1.7*    < > = values in this interval not displayed.           Results from last 7 days   Lab Units 06/02/24 0443 06/01/24  0451 05/31/24  0507 05/30/24  0512 05/29/24  0526 05/28/24  0449 05/27/24  0600 05/26/24  1446   GLUCOSE RANDOM mg/dL 113 110 120 114 115 117 117 122                 Medications:   Scheduled Medications:  amitriptyline, 75 mg, Oral, HS  dapsone, 50 mg, Oral, Daily  gabapentin, 100 mg, Oral, BID  hydroxychloroquine, 200 mg, Oral, BID With Meals  lidocaine, 1 patch, Topical, Daily  rifaximin, 550 mg, Oral,  BID  sertraline, 100 mg, Oral, Daily  spironolactone, 50 mg, Oral, Daily  torsemide, 20 mg, Oral, BID AC      Continuous IV Infusions:     PRN Meds:  acetaminophen, 650 mg, Oral, Q6H PRN  benzonatate, 100 mg, Oral, TID PRN  hydrocortisone, , Topical, 4x Daily PRN  ipratropium-albuterol, 3 mL, Nebulization, Q6H PRN  lactulose, 10 g, Oral, BID PRN  oxyCODONE, 10 mg, Oral, Q4H PRN  polyethylene glycol, 17 g, Oral, Daily PRN        Discharge Plan:   TBD    Network Utilization Review Department  ATTENTION: Please call with any questions or concerns to [phone] and carefully listen to the prompts so that you are directed to the right person. All voicemails are confidential.   For Discharge needs, contact Care Management DC Support Team at [phone] opt. 2  Send all requests for admission clinical reviews, approved or denied determinations and any other requests to dedicated fax number below belonging to the [place] where the patient is receiving treatment. List of dedicated fax numbers for the Facilities:  FACILITY NAME UR FAX NUMBER   ADMISSION DENIALS (Administrative/Medical Necessity) [phone]   DISCHARGE SUPPORT TEAM (NETWORK) [phone]   PARENT CHILD HEALTH (Maternity/NICU/Pediatrics) [phone]   [facility] [phone]   [facility] [phone]   [facility] [phone]   [facility] [phone]   [facility] [phone]   [facility] [phone]   [facility] [phone]   [facility] [phone]   [facility] [phone]   [facility] [phone]   [facility] [phone]   [facility] [phone]

## 2024-06-02 NOTE — ASSESSMENT & PLAN NOTE
"\"- GI Carcinoid tumor, dx 2006 involved small bowel/ duodenum and portal lymph nodes, s/p debulking in 7/2008 could not fully resect and was recieving Sandostatin q4 weeks at [facility] and follow-up 24 hr urine 5-HIAA level and chromogranin A level as well as octreotide scan or Gallium/PET scan  - Transfer care to [facility] in 8/2018. continued on Sandostatin Q 28 days. Overall tolerates treatment well. Continue current treatment    - Carcinoid tumor marker Chromogranin A level has been normal however 5 HIAA has increased. Finally insurance approved Gallium/ PET scan 8/2/2022 report stable disease.\"   -- per recent Heme Onc note from [place]  "

## 2024-06-02 NOTE — DISCHARGE SUMMARY
"[facility]  Discharge- Bailey Olsen 1970, 53 y.o. female MRN: [id number]  Unit/Bed#: -01 Encounter: [id number]  Primary Care Provider: Nayely Brown DO   Date and time admitted to hospital: 5/26/2024  2:35 PM    * Bilateral pulmonary infiltrates  Assessment & Plan  53-year-old female with history of cirrhosis, carcinoid tumor who initially presented with shortness of breath peripheral edema  CTA revealed \"Bilateral multi lobar consolidation with evidence of interstitial edema, and small bilateral pleural effusions. Findings may represent multifocal pneumonia (infectious or organizing pneumonia) with superimposed vascular congestion. Pulmonary hemorrhage\" . Neg for pulm embolism  Currently afebrile, WBC count within normal limits  Pro-Ernesto within normal limits placed on ceftriaxone and complete 5 doses.last dose today  Consolidations may be secondary to CHF  Will provide IV Lasix as outlined below   pulmonology consult for further evaluation due to pancytopenia and carcinoid tumor history.  Repeat CT scan of the chest in 8 weeks or earlier if she develops worsening respiratory symptoms. If she worsens she will most likely need bronchoscopy and bronchoalveolar lavage.     Acute on chronic diastolic (congestive) heart failure (HCC)  Assessment & Plan  Wt Readings from Last 3 Encounters:   06/02/24 119 kg (263 lb 0.1 oz)     Presented with shortness of breath with exertion and peripheral edema  CT scan revealed pulmonary edema  patient has lost 27 pounds since admission increase Lasix from 40 mg-60 mg - 80 IV twice daily and also add low-dose Aldactone due to history of liver cirrhosis.  Stop IV Lasix today transition to torsemide 20 mg oral twice daily and also increase Aldactone to 50 mg daily and observe response  2 d echo normal lv function  Renal function stable so far.hold lisinopril to cont diuresis and avoid hypotension  overnight oxygen requirement study done and " "does not qualify for nocturnal oxygen.recommend sleep study          Sarcoidosis  Assessment & Plan  History of sarcoidosis of the skin, complaining of a lot of itching swelling and discomfort in her legs.  Also has elevated inflammatory markers  Venous Doppler bilateral lower extremity negative for DVT  Will place on a short course of prednisone 40 mg daily for 3  days (completed) and observe and recommend outpatient follow-up for her sarcoidosis.resume dapsone 50 mg daily  Will place on topical hydrocortisone cream to legs as well  Uric acid level is normal    Pancytopenia (HCC)  Assessment & Plan  Chronic, suspected to be from splenomegaly and liver cirrhosis  [facility]  Cell counts on admission appear stable from most recent outpatient labs in early May  trend    Iron deficiency anemia  Assessment & Plan  History gastric bypass  Follows with [facility]  Currently Hgb around baseline (10.4 on outpatient labs early may)      Anxiety  Assessment & Plan  Anxiety/Depression  Continue amitryptiline.stable    Primary hypertension  Assessment & Plan  Hypertensive on admission, Hx HTN in chart  On lasix  hold lisinopril   Follow BPs    Class 3 severe obesity due to excess calories in adult (HCC)  Assessment & Plan  Dietary and lifestyle changes    History of malignant carcinoid tumor of small intestine  Assessment & Plan  \"- GI Carcinoid tumor, dx 2006 involved small bowel/ duodenum and portal lymph nodes, s/p debulking in 7/2008 could not fully resect and was recieving Sandostatin q4 weeks at [facility] and follow-up 24 hr urine 5-HIAA level and chromogranin A level as well as octreotide scan or Gallium/PET scan  - Transfer care to [facility] in 8/2018. continued on Sandostatin Q 28 days. Overall tolerates treatment well. Continue current treatment    - Carcinoid tumor marker Chromogranin A level has been normal however 5 HIAA has increased. Finally insurance approved Gallium/ PET scan 8/2/2022 " "report stable disease.\"   -- per recent Heme Onc note from [place]    Other cirrhosis of liver (HCC)  Assessment & Plan  Due to NAFLD  Hx HE  3/19/24 EGD with Grade 1 Varices  LFTs remain stable  -continue Xifaxin, lactulose, and torsemide      Discharging Physician / Practitioner: Idalia Stuart MD  PCP: Nayely Brown,   Admission Date:   Admission Orders (From admission, onward)       Ordered        05/26/24 1651  INPATIENT ADMISSION  Once                          Discharge Date: 06/02/24    Medical Problems       Resolved Problems  Date Reviewed: 6/2/2024   None         Consultations During Hospital Stay:  pulmonology    Procedures Performed:   none    Significant Findings / Test Results:   CTA ED chest PE study    Result Date: 5/26/2024  Impression: No pulmonary embolus. Bilateral multi lobar consolidation with evidence of interstitial edema, and small bilateral pleural effusions. Findings may represent multifocal pneumonia (infectious or organizing pneumonia) with superimposed vascular congestion. Pulmonary hemorrhage would also be a consideration. The study was marked in EPIC for immediate notification. Workstation performed: [id number]      Incidental Findings:   none    Test Results Pending at Discharge (will require follow up):   none     Outpatient Tests Requested:  Cbc ,Cmp in 2 weeks    Complications:  none    Reason for Admission: Shortness of breath    Hospital Course:     Bailey Olsen is a 53 y.o. female patient who originally presented to the hospital on 5/26/2024 due to acute on chronic diastolic CHF and underlying liver cirrhosis and presented very fluid overloaded patient was diuresed and lost 31 pounds while inpatient with diuresis also noted to have leg pain and elevated laboratory markers and also gave a 3-day course of oral prednisone and restarted her dapsone and also placed on oral hydrocortisone cream for her legs due to sarcoidosis.  Patient also has chronic pancytopenia and history " "of malignant carcinoid tumor of the small intestine which are currently stable.  Will discharge home today with torsemide and Aldactone and repeat labs in 2 weeks also referred outpatient to rheumatology due to her sarcoid     Please see above list of diagnoses and related plan for additional information.     Condition at Discharge: good     Discharge Day Visit / Exam:     Subjective: Patient denies any chest pain feels her shortness of breath leg swelling is so much better she is very happy to lose 31 pounds  Vitals: Blood Pressure: 100/56 (06/02/24 0700)  Pulse: 76 (06/02/24 0700)  Temperature: 97.9 °F (36.6 °C) (06/02/24 0700)  Temp Source: Temporal (06/02/24 0700)  Respirations: 18 (06/02/24 0700)  Height: 5' 5\" (165.1 cm) (05/28/24 0945)  Weight - Scale: 119 kg (263 lb 0.1 oz) (06/02/24 0536)  SpO2: 92 % (06/02/24 0700)  Exam:   Physical Exam  Vitals and nursing note reviewed.   Constitutional:       Appearance: Normal appearance.   HENT:      Head: Normocephalic and atraumatic.      Right Ear: External ear normal.      Left Ear: External ear normal.      Nose: Nose normal.      Mouth/Throat:      Pharynx: Oropharynx is clear.   Eyes:      Pupils: Pupils are equal, round, and reactive to light.   Cardiovascular:      Rate and Rhythm: Normal rate and regular rhythm.      Heart sounds: Normal heart sounds.   Pulmonary:      Effort: Pulmonary effort is normal.      Breath sounds: Normal breath sounds.   Abdominal:      General: Bowel sounds are normal.      Palpations: Abdomen is soft.      Tenderness: There is no abdominal tenderness.   Musculoskeletal:         General: Normal range of motion.      Cervical back: Normal range of motion and neck supple.   Skin:     General: Skin is warm and dry.      Capillary Refill: Capillary refill takes less than 2 seconds.   Neurological:      General: No focal deficit present.      Mental Status: She is alert and oriented to person, place, and time.   Psychiatric:         " Mood and Affect: Mood normal.         Discussion with Family: none    Discharge instructions/Information to patient and family:   See after visit summary for information provided to patient and family.      Provisions for Follow-Up Care:  See after visit summary for information related to follow-up care and any pertinent home health orders.      Disposition:     Home    For Discharges to [facility]:   Not Applicable to this Patient - Not Applicable to this Patient    Planned Readmission: none     Discharge Statement:  I spent 35 minutes discharging the patient. This time was spent on the day of discharge. I had direct contact with the patient on the day of discharge. Greater than 50% of the total time was spent examining patient, answering all patient questions, arranging and discussing plan of care with patient as well as directly providing post-discharge instructions.  Additional time then spent on discharge activities.    Discharge Medications:  See after visit summary for reconciled discharge medications provided to patient and family.      ** Please Note: This note has been constructed using a voice recognition system **

## 2024-06-02 NOTE — ASSESSMENT & PLAN NOTE
Due to NAFLD  Hx HE  3/19/24 EGD with Grade 1 Varices  LFTs remain stable  -continue Xifaxin, lactulose, and torsemide

## 2024-06-02 NOTE — DISCHARGE INSTR - AVS FIRST PAGE
Your Discharge weight from the hospital is 263 pounds.  Please drink less than 1500 mL of all fluids in 24 hours.  Blood work in 2 weeks including a CBC and a CMP  If your weight drops under 250 pounds then decrease your torsemide to 20 mg once daily from twice daily    Please follow-up with your family doctor in 1 week and also referral has been sent to rheumatology for your sarcoidosis  sarcoidoses can also be managed by your pulmonologist  Referral was already sent to [facility] pulmonology and they will be contacting you for a follow-up appointment

## 2024-06-02 NOTE — PLAN OF CARE
Problem: PAIN - ADULT  Goal: Verbalizes/displays adequate comfort level or baseline comfort level  Description: Interventions:  - Encourage patient to monitor pain and request assistance  - Assess pain using appropriate pain scale  - Administer analgesics based on type and severity of pain and evaluate response  - Implement non-pharmacological measures as appropriate and evaluate response  - Consider cultural and social influences on pain and pain management  - Notify physician/advanced practitioner if interventions unsuccessful or patient reports new pain  Outcome: Progressing     Problem: INFECTION - ADULT  Goal: Absence or prevention of progression during hospitalization  Description: INTERVENTIONS:  - Assess and monitor for signs and symptoms of infection  - Monitor lab/diagnostic results  - Monitor all insertion sites, i.e. indwelling lines, tubes, and drains  - Monitor endotracheal if appropriate and nasal secretions for changes in amount and color  - [place] appropriate cooling/warming therapies per order  - Administer medications as ordered  - Instruct and encourage patient and family to use good hand hygiene technique  - Identify and instruct in appropriate isolation precautions for identified infection/condition  Outcome: Progressing     Problem: SAFETY ADULT  Goal: Patient will remain free of falls  Description: INTERVENTIONS:  - Educate patient/family on patient safety including physical limitations  - Instruct patient to call for assistance with activity   - Consult OT/PT to assist with strengthening/mobility   - Keep Call bell within reach  - Keep bed low and locked with side rails adjusted as appropriate  - Keep care items and personal belongings within reach  - Initiate and maintain comfort rounds  - Make Fall Risk Sign visible to staff  - Offer Toileting every 2 Hours, in advance of need  - Initiate/Maintain alarm  - Obtain necessary fall risk management equipment:   - Apply yellow socks and  bracelet for high fall risk patients  - Consider moving patient to room near nurses station  Outcome: Progressing  Goal: Maintain or return to baseline ADL function  Description: INTERVENTIONS:  -  Assess patient's ability to carry out ADLs; assess patient's baseline for ADL function and identify physical deficits which impact ability to perform ADLs (bathing, care of mouth/teeth, toileting, grooming, dressing, etc.)  - Assess/evaluate cause of self-care deficits   - Assess range of motion  - Assess patient's mobility; develop plan if impaired  - Assess patient's need for assistive devices and provide as appropriate  - Encourage maximum independence but intervene and supervise when necessary  - Involve family in performance of ADLs  - Assess for home care needs following discharge   - Consider OT consult to assist with ADL evaluation and planning for discharge  - Provide patient education as appropriate  Outcome: Progressing  Goal: Maintains/Returns to pre admission functional level  Description: INTERVENTIONS:  - Perform AM-PAC 6 Click Basic Mobility/ Daily Activity assessment daily.  - Set and communicate daily mobility goal to care team and patient/family/caregiver.   - Collaborate with rehabilitation services on mobility goals if consulted  - Ambulate patient 3 times a day  - Out of bed to chair 3 times a day   - Out of bed for meals 3 times a day  - Out of bed for toileting  - Record patient progress and toleration of activity level   Outcome: Progressing     Problem: DISCHARGE PLANNING  Goal: Discharge to home or other facility with appropriate resources  Description: INTERVENTIONS:  - Identify barriers to discharge w/patient and caregiver  - Arrange for needed discharge resources and transportation as appropriate  - Identify discharge learning needs (meds, wound care, etc.)  - Arrange for interpretive services to assist at discharge as needed  - Refer to Case Management Department for coordinating discharge  planning if the patient needs post-hospital services based on physician/advanced practitioner order or complex needs related to functional status, cognitive ability, or social support system  Outcome: Progressing

## 2024-06-03 NOTE — UTILIZATION REVIEW
NOTIFICATION OF ADMISSION DISCHARGE   This is a Notification of Discharge from [facility]. Please be advised that this patient has been discharge from our facility. Below you will find the admission and discharge date and time including the patient’s disposition.   UTILIZATION REVIEW CONTACT:  Елена Self  Utilization   Network Utilization Review Department  Phone: [phone] x carefully listen to the prompts. All voicemails are confidential.  Email: [email or web address]     ADMISSION INFORMATION  PRESENTATION DATE: 5/26/2024  2:35 PM  OBERVATION ADMISSION DATE:   INPATIENT ADMISSION DATE: 5/26/24  4:51 PM   DISCHARGE DATE: 6/2/2024  2:03 PM   DISPOSITION:Home/Self Care    Network Utilization Review Department  ATTENTION: Please call with any questions or concerns to [phone] and carefully listen to the prompts so that you are directed to the right person. All voicemails are confidential.   For Discharge needs, contact Care Management DC Support Team at [phone] opt. 2  Send all requests for admission clinical reviews, approved or denied determinations and any other requests to dedicated fax number below belonging to the campus where the patient is receiving treatment. List of dedicated fax numbers for the Facilities:  FACILITY NAME UR FAX NUMBER   ADMISSION DENIALS (Administrative/Medical Necessity) [phone]   DISCHARGE SUPPORT TEAM ([facility]) [phone]   PARENT CHILD HEALTH (Maternity/NICU/Pediatrics) [phone]   [facility] [phone]   [facility] [phone]   [facility] [phone]   [facility] [phone]   [facility] [phone]   [facility] [phone]   [facility] [phone]   [facility]  [phone]   [facility] [phone]   [facility] [phone]   [facility] [phone]   [facility] [phone]

## 2024-06-05 LAB
DME PARACHUTE DELIVERY DATE ACTUAL: NORMAL
DME PARACHUTE DELIVERY DATE REQUESTED: NORMAL
DME PARACHUTE ITEM DESCRIPTION: NORMAL
DME PARACHUTE ORDER STATUS: NORMAL
DME PARACHUTE SUPPLIER NAME: NORMAL
DME PARACHUTE SUPPLIER PHONE: NORMAL

## 2024-06-11 ENCOUNTER — APPOINTMENT (INPATIENT)
Dept: CT IMAGING | Facility: HOSPITAL | Age: 54
DRG: 442 | End: 2024-06-11
Payer: COMMERCIAL

## 2024-06-11 ENCOUNTER — APPOINTMENT (INPATIENT)
Dept: ULTRASOUND IMAGING | Facility: HOSPITAL | Age: 54
DRG: 442 | End: 2024-06-11
Payer: COMMERCIAL

## 2024-06-11 ENCOUNTER — APPOINTMENT (EMERGENCY)
Dept: CT IMAGING | Facility: HOSPITAL | Age: 54
DRG: 442 | End: 2024-06-11
Payer: COMMERCIAL

## 2024-06-11 ENCOUNTER — HOSPITAL ENCOUNTER (INPATIENT)
Facility: HOSPITAL | Age: 54
LOS: 7 days | Discharge: HOME HEALTH | DRG: 442 | End: 2024-06-18
Attending: EMERGENCY MEDICINE | Admitting: FAMILY MEDICINE
Payer: COMMERCIAL

## 2024-06-11 DIAGNOSIS — D86.9 SARCOIDOSIS: ICD-10-CM
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
## 2024-06-11 DIAGNOSIS — K76.82 HEPATIC ENCEPHALOPATHY (HCC): Primary | ICD-10-CM

## 2024-06-11 PROBLEM — I50.32 CHRONIC DIASTOLIC (CONGESTIVE) HEART FAILURE (HCC): Status: ACTIVE | Noted: 2024-05-26

## 2024-06-11 LAB
ALBUMIN SERPL BCP-MCNC: 3.6 G/DL (ref 3.5–5)
ALP SERPL-CCNC: 250 U/L (ref 34–104)
ALT SERPL W P-5'-P-CCNC: 48 U/L (ref 7–52)
AMMONIA PLAS-SCNC: 159 UMOL/L (ref 18–72)
ANION GAP SERPL CALCULATED.3IONS-SCNC: 6 MMOL/L (ref 4–13)
APTT PPP: 27 SECONDS (ref 23–37)
AST SERPL W P-5'-P-CCNC: 86 U/L (ref 13–39)
BASOPHILS # BLD AUTO: 0.04 THOUSANDS/ÂΜL (ref 0–0.1)
BASOPHILS NFR BLD AUTO: 2 % (ref 0–1)
BILIRUB SERPL-MCNC: 1.49 MG/DL (ref 0.2–1)
BNP SERPL-MCNC: 9 PG/ML (ref 0–100)
BUN SERPL-MCNC: 23 MG/DL (ref 5–25)
CALCIUM SERPL-MCNC: 9.5 MG/DL (ref 8.4–10.2)
CARDIAC TROPONIN I PNL SERPL HS: 5 NG/L
CHLORIDE SERPL-SCNC: 98 MMOL/L (ref 96–108)
CO2 SERPL-SCNC: 32 MMOL/L (ref 21–32)
CREAT SERPL-MCNC: 1.24 MG/DL (ref 0.6–1.3)
EOSINOPHIL # BLD AUTO: 0.08 THOUSAND/ÂΜL (ref 0–0.61)
EOSINOPHIL NFR BLD AUTO: 3 % (ref 0–6)
ERYTHROCYTE [DISTWIDTH] IN BLOOD BY AUTOMATED COUNT: 13.4 % (ref 11.6–15.1)
FLUAV RNA RESP QL NAA+PROBE: NEGATIVE
FLUBV RNA RESP QL NAA+PROBE: NEGATIVE
GFR SERPL CREATININE-BSD FRML MDRD: 49 ML/MIN/1.73SQ M
GLUCOSE SERPL-MCNC: 118 MG/DL (ref 65–140)
HCT VFR BLD AUTO: 37.7 % (ref 34.8–46.1)
HGB BLD-MCNC: 12.3 G/DL (ref 11.5–15.4)
IMM GRANULOCYTES # BLD AUTO: 0 THOUSAND/UL (ref 0–0.2)
IMM GRANULOCYTES NFR BLD AUTO: 0 % (ref 0–2)
INR PPP: 1.27 (ref 0.84–1.19)
LACTATE SERPL-SCNC: 1.8 MMOL/L (ref 0.5–2)
LIPASE SERPL-CCNC: 21 U/L (ref 11–82)
LYMPHOCYTES # BLD AUTO: 0.78 THOUSANDS/ÂΜL (ref 0.6–4.47)
LYMPHOCYTES NFR BLD AUTO: 30 % (ref 14–44)
MAGNESIUM SERPL-MCNC: 2.1 MG/DL (ref 1.9–2.7)
MCH RBC QN AUTO: 32.5 PG (ref 26.8–34.3)
MCHC RBC AUTO-ENTMCNC: 32.6 G/DL (ref 31.4–37.4)
MCV RBC AUTO: 100 FL (ref 82–98)
MONOCYTES # BLD AUTO: 0.19 THOUSAND/ÂΜL (ref 0.17–1.22)
MONOCYTES NFR BLD AUTO: 7 % (ref 4–12)
NEUTROPHILS # BLD AUTO: 1.49 THOUSANDS/ÂΜL (ref 1.85–7.62)
NEUTS SEG NFR BLD AUTO: 58 % (ref 43–75)
NRBC BLD AUTO-RTO: 0 /100 WBCS
PLATELET # BLD AUTO: 108 THOUSANDS/UL (ref 149–390)
PMV BLD AUTO: 10.2 FL (ref 8.9–12.7)
POTASSIUM SERPL-SCNC: 4 MMOL/L (ref 3.5–5.3)
PROT SERPL-MCNC: 8.4 G/DL (ref 6.4–8.4)
PROTHROMBIN TIME: 16.2 SECONDS (ref 11.6–14.5)
RBC # BLD AUTO: 3.79 MILLION/UL (ref 3.81–5.12)
RSV RNA RESP QL NAA+PROBE: NEGATIVE
SARS-COV-2 RNA RESP QL NAA+PROBE: NEGATIVE
SODIUM SERPL-SCNC: 136 MMOL/L (ref 135–147)
WBC # BLD AUTO: 2.58 THOUSAND/UL (ref 4.31–10.16)

## 2024-06-11 PROCEDURE — 70450 CT HEAD/BRAIN W/O DYE: CPT

## 2024-06-11 PROCEDURE — 85730 THROMBOPLASTIN TIME PARTIAL: CPT | Performed by: EMERGENCY MEDICINE

## 2024-06-11 PROCEDURE — 85610 PROTHROMBIN TIME: CPT | Performed by: EMERGENCY MEDICINE

## 2024-06-11 PROCEDURE — 76705 ECHO EXAM OF ABDOMEN: CPT

## 2024-06-11 PROCEDURE — 0241U HB NFCT DS VIR RESP RNA 4 TRGT: CPT | Performed by: EMERGENCY MEDICINE

## 2024-06-11 PROCEDURE — 82140 ASSAY OF AMMONIA: CPT | Performed by: EMERGENCY MEDICINE

## 2024-06-11 PROCEDURE — 80053 COMPREHEN METABOLIC PANEL: CPT | Performed by: EMERGENCY MEDICINE

## 2024-06-11 PROCEDURE — 99285 EMERGENCY DEPT VISIT HI MDM: CPT

## 2024-06-11 PROCEDURE — 84484 ASSAY OF TROPONIN QUANT: CPT | Performed by: EMERGENCY MEDICINE

## 2024-06-11 PROCEDURE — 99285 EMERGENCY DEPT VISIT HI MDM: CPT | Performed by: EMERGENCY MEDICINE

## 2024-06-11 PROCEDURE — 93005 ELECTROCARDIOGRAM TRACING: CPT

## 2024-06-11 PROCEDURE — 74177 CT ABD & PELVIS W/CONTRAST: CPT

## 2024-06-11 PROCEDURE — 36415 COLL VENOUS BLD VENIPUNCTURE: CPT | Performed by: EMERGENCY MEDICINE

## 2024-06-11 PROCEDURE — 83690 ASSAY OF LIPASE: CPT | Performed by: EMERGENCY MEDICINE

## 2024-06-11 PROCEDURE — 83605 ASSAY OF LACTIC ACID: CPT | Performed by: EMERGENCY MEDICINE

## 2024-06-11 PROCEDURE — 87040 BLOOD CULTURE FOR BACTERIA: CPT | Performed by: EMERGENCY MEDICINE

## 2024-06-11 PROCEDURE — 85025 COMPLETE CBC W/AUTO DIFF WBC: CPT | Performed by: EMERGENCY MEDICINE

## 2024-06-11 PROCEDURE — 83735 ASSAY OF MAGNESIUM: CPT | Performed by: EMERGENCY MEDICINE

## 2024-06-11 PROCEDURE — 99223 1ST HOSP IP/OBS HIGH 75: CPT | Performed by: FAMILY MEDICINE

## 2024-06-11 PROCEDURE — 83880 ASSAY OF NATRIURETIC PEPTIDE: CPT | Performed by: EMERGENCY MEDICINE

## 2024-06-11 RX ORDER — ACETAMINOPHEN 325 MG/1
650 TABLET ORAL EVERY 6 HOURS PRN
Status: DISCONTINUED | OUTPATIENT
Start: 2024-06-11 | End: 2024-06-18 | Stop reason: HOSPADM

## 2024-06-11 RX ORDER — BENZOCAINE/MENTHOL 6 MG-10 MG
LOZENGE MUCOUS MEMBRANE 2 TIMES DAILY
Status: DISCONTINUED | OUTPATIENT
Start: 2024-06-11 | End: 2024-06-18 | Stop reason: HOSPADM

## 2024-06-11 RX ORDER — OXYCODONE HYDROCHLORIDE 10 MG/1
10 TABLET ORAL EVERY 6 HOURS PRN
Status: DISCONTINUED | OUTPATIENT
Start: 2024-06-11 | End: 2024-06-18 | Stop reason: HOSPADM

## 2024-06-11 RX ORDER — LACTULOSE 10 G/15ML
20 SOLUTION ORAL 4 TIMES DAILY
Status: DISCONTINUED | OUTPATIENT
Start: 2024-06-11 | End: 2024-06-12

## 2024-06-11 RX ORDER — HEPARIN SODIUM 5000 [USP'U]/ML
5000 INJECTION, SOLUTION INTRAVENOUS; SUBCUTANEOUS EVERY 8 HOURS SCHEDULED
Status: DISCONTINUED | OUTPATIENT
Start: 2024-06-11 | End: 2024-06-12

## 2024-06-11 RX ORDER — GABAPENTIN 100 MG/1
100 CAPSULE ORAL 2 TIMES DAILY
Status: DISCONTINUED | OUTPATIENT
Start: 2024-06-11 | End: 2024-06-18 | Stop reason: HOSPADM

## 2024-06-11 RX ORDER — OXYCODONE HYDROCHLORIDE 10 MG/1
10 TABLET ORAL EVERY 6 HOURS PRN
Status: DISCONTINUED | OUTPATIENT
Start: 2024-06-11 | End: 2024-06-11

## 2024-06-11 RX ORDER — AMITRIPTYLINE HYDROCHLORIDE 25 MG/1
75 TABLET, FILM COATED ORAL
Status: DISCONTINUED | OUTPATIENT
Start: 2024-06-11 | End: 2024-06-18 | Stop reason: HOSPADM

## 2024-06-11 RX ORDER — FOLIC ACID 1 MG/1
1 TABLET ORAL DAILY
Status: DISCONTINUED | OUTPATIENT
Start: 2024-06-11 | End: 2024-06-18 | Stop reason: HOSPADM

## 2024-06-11 RX ORDER — DAPSONE 25 MG/1
50 TABLET ORAL DAILY
Status: DISCONTINUED | OUTPATIENT
Start: 2024-06-11 | End: 2024-06-18 | Stop reason: HOSPADM

## 2024-06-11 RX ORDER — ONDANSETRON 2 MG/ML
4 INJECTION INTRAMUSCULAR; INTRAVENOUS EVERY 6 HOURS PRN
Status: DISCONTINUED | OUTPATIENT
Start: 2024-06-11 | End: 2024-06-18 | Stop reason: HOSPADM

## 2024-06-11 RX ORDER — TORSEMIDE 20 MG/1
20 TABLET ORAL
Status: DISCONTINUED | OUTPATIENT
Start: 2024-06-11 | End: 2024-06-18 | Stop reason: HOSPADM

## 2024-06-11 RX ORDER — LACTULOSE 10 G/15ML
30 SOLUTION ORAL ONCE
Status: COMPLETED | OUTPATIENT
Start: 2024-06-11 | End: 2024-06-11

## 2024-06-11 RX ORDER — SERTRALINE HYDROCHLORIDE 100 MG/1
100 TABLET, FILM COATED ORAL DAILY
Status: DISCONTINUED | OUTPATIENT
Start: 2024-06-11 | End: 2024-06-18 | Stop reason: HOSPADM

## 2024-06-11 RX ORDER — SPIRONOLACTONE 25 MG/1
50 TABLET ORAL DAILY
Status: DISCONTINUED | OUTPATIENT
Start: 2024-06-12 | End: 2024-06-18 | Stop reason: HOSPADM

## 2024-06-11 RX ORDER — HYDROXYCHLOROQUINE SULFATE 200 MG/1
200 TABLET, FILM COATED ORAL 2 TIMES DAILY WITH MEALS
Status: DISCONTINUED | OUTPATIENT
Start: 2024-06-11 | End: 2024-06-18 | Stop reason: HOSPADM

## 2024-06-11 RX ADMIN — OXYCODONE HYDROCHLORIDE 10 MG: 10 TABLET ORAL at 16:28

## 2024-06-11 RX ADMIN — LACTULOSE 30 G: 20 SOLUTION ORAL at 12:43

## 2024-06-11 RX ADMIN — HEPARIN SODIUM 5000 UNITS: 5000 INJECTION, SOLUTION INTRAVENOUS; SUBCUTANEOUS at 21:43

## 2024-06-11 RX ADMIN — IOHEXOL 100 ML: 350 INJECTION, SOLUTION INTRAVENOUS at 12:37

## 2024-06-11 RX ADMIN — AMITRIPTYLINE HYDROCHLORIDE 75 MG: 25 TABLET, FILM COATED ORAL at 21:48

## 2024-06-11 RX ADMIN — TORSEMIDE 20 MG: 20 TABLET ORAL at 16:28

## 2024-06-11 RX ADMIN — DAPSONE 50 MG: 25 TABLET ORAL at 16:30

## 2024-06-11 RX ADMIN — LACTULOSE 20 G: 20 SOLUTION ORAL at 21:43

## 2024-06-11 RX ADMIN — RIFAXIMIN 550 MG: 550 TABLET ORAL at 17:24

## 2024-06-11 RX ADMIN — OXYCODONE HYDROCHLORIDE 10 MG: 10 TABLET ORAL at 22:38

## 2024-06-11 RX ADMIN — MORPHINE SULFATE 2 MG: 2 INJECTION, SOLUTION INTRAMUSCULAR; INTRAVENOUS at 13:42

## 2024-06-11 RX ADMIN — HYDROXYCHLOROQUINE SULFATE 200 MG: 200 TABLET, FILM COATED ORAL at 16:32

## 2024-06-11 RX ADMIN — GABAPENTIN 100 MG: 100 CAPSULE ORAL at 17:24

## 2024-06-11 RX ADMIN — HEPARIN SODIUM 5000 UNITS: 5000 INJECTION, SOLUTION INTRAVENOUS; SUBCUTANEOUS at 17:24

## 2024-06-11 RX ADMIN — FOLIC ACID 1 MG: 1 TABLET ORAL at 16:28

## 2024-06-11 RX ADMIN — SERTRALINE HYDROCHLORIDE 100 MG: 100 TABLET ORAL at 16:28

## 2024-06-11 RX ADMIN — LACTULOSE 20 G: 20 SOLUTION ORAL at 17:24

## 2024-06-11 NOTE — ASSESSMENT & PLAN NOTE
Wt Readings from Last 3 Encounters:   06/11/24 118 kg (260 lb)   06/02/24 119 kg (263 lb 0.1 oz)     Recently discharged with weight 263 pounds  Echo 5/28/2024 -EF 65 to 69% with normal systolic function, normal wall motion and normal diastolic function, no significant valvular abnormality  On torsemide 20 mg twice daily, spironolactone 50 mg daily  Continue diuretics  Not in acute exacerbation

## 2024-06-11 NOTE — ED NOTES
This RN and multiple other RNs heard yelling from bathroom. Patient found sitting up on her buttocks on bathroom floor. Patient reports she was sitting on toilet and it felt wet so she stood up and slipped causing her to hit her head on sink approximately 6 feet away from toilet. Patient c/o 9/10 head pain. Provider in bathroom to assess patient. Patient alert and oriented. Assisted into stretcher that was brought into bathroom. C/o dizziness intermittently. Patient had been assisted to bathroom by another RN and was instructed to pull string when she was finished to be assisted up from toilet and back to room. Patient did have non  socks on feet when taken to bathroom.      Jagruti Jimenez RN  06/11/24 9247       Jagruti Jimenez RN  06/11/24 7630

## 2024-06-11 NOTE — ED NOTES
Ice applied to patient's forehead where she hit head on sink.      Jagruti Jimenez, RN  06/11/24 5503

## 2024-06-11 NOTE — ASSESSMENT & PLAN NOTE
"Presents to ED with increasing weakness and shakes with slight confusion for the last 3 to 4 days  Has history of known hepatic cirrhosis, states she was off lactulose  Reviewed recent discharge diagnosis -was instructed to continue taking lactulose  Is prescribed lactulose 10 g daily as needed for constipation    CT head revealing \" no acute intercranial abnormality\"  Ammonia 59  LFTs  -AST mildly elevated, bilirubin mildly elevated  INR 1.7  Pancytopenia stable    Lactulose 30 mg given x 1 - continue 4 times daily 20 mg, titrate for 3-4 bowel movements a day  Aspiration precautions  Intake and output with BM counts to assist with lactulose titration  Avoid any sedatives or hepatotoxic medications if at all possible  Neuro checks     "

## 2024-06-11 NOTE — H&P
"[facility]  H&P  Name: Bailey Olsen 53 y.o. female I MRN: [id number]  Unit/Bed#: -01 I Date of Admission: 6/11/2024   Date of Service: 6/11/2024 I Hospital Day: 0      Assessment & Plan   * Hepatic encephalopathy (HCC)  Assessment & Plan  Presents to ED with increasing weakness and shakes with slight confusion for the last 3 to 4 days  Has history of known hepatic cirrhosis, states she was off lactulose  Reviewed recent discharge diagnosis -was instructed to continue taking lactulose  Is prescribed lactulose 10 g daily as needed for constipation    CT head revealing \" no acute intercranial abnormality\"  Ammonia 59  LFTs  -AST mildly elevated, bilirubin mildly elevated  INR 1.7  Pancytopenia stable    Lactulose 30 mg given x 1 - continue 4 times daily 20 mg, titrate for 3-4 bowel movements a day  Aspiration precautions  Intake and output with BM counts to assist with lactulose titration  Avoid any sedatives or hepatotoxic medications if at all possible  Neuro checks       Fall  Assessment & Plan  Patient experienced a fall in the ED  Still in the bathroom hitting head off sink  Repeat head CT    PT/OT consult    Chronic diastolic (congestive) heart failure (HCC)  Assessment & Plan  Wt Readings from Last 3 Encounters:   06/11/24 118 kg (260 lb)   06/02/24 119 kg (263 lb 0.1 oz)     Recently discharged with weight 263 pounds  Echo 5/28/2024 -EF 65 to 69% with normal systolic function, normal wall motion and normal diastolic function, no significant valvular abnormality  On torsemide 20 mg twice daily, spironolactone 50 mg daily  Continue diuretics  Not in acute exacerbation    Sarcoidosis  Assessment & Plan  History of sarcoidosis of skin     Pancytopenia (HCC)  Assessment & Plan  Chronic secondary to cirrhosis   Stable at baseline     Class 3 severe obesity due to excess calories in adult (HCC)  Assessment & Plan  Recommend diet and lifestyle management    Other cirrhosis of liver " (HCC)  Assessment & Plan  Chronically maintained on spironolactone 50 mg daily, torsemide 20 mg twice daily patient is vaccinated for 50 mg twice daily and lactulose 20 g as needed daily    Presenting with hepatic encephalopathy  CT abdomen pelvis -cirrhotic liver with portal hypertension and splenomegaly, no acute findings  With distended abdomen will do ascites check adominal ultrasound  Likely consult IR tomorrow for paracentesis if significant ascites         VTE Pharmacologic Prophylaxis: VTE Score: 4 Moderate Risk (Score 3-4) - Pharmacological DVT Prophylaxis Ordered: heparin.  Code Status: Level 1 - Full Code   Discussion with family: Patient declined call to .     Anticipated Length of Stay: Patient will be admitted on an inpatient basis with an anticipated length of stay of greater than 2 midnights secondary to hepatic encephalopathy.    Total Time Spent on Date of Encounter in care of patient:  mins. This time was spent on one or more of the following: performing physical exam; counseling and coordination of care; obtaining or reviewing history; documenting in the medical record; reviewing/ordering tests, medications or procedures; communicating with other healthcare professionals and discussing with patient's family/caregivers.    Chief Complaint: Confusion    History of Present Illness:  Bailey Olsen is a 53 y.o. female with a PMH of cirrhosis, CHF, sarcoidosis, pancytopenia, obesity who presents with confusion at home with feeling shaky.  Noticed that she had started become constipated.  She states after her last hospitalization she was taken off lactulose.  When she noted she started to become more fatigued and shaky she started taking lactulose again in the last 2 days however has not had bowel movement..  Feeling like her abdomen is more bloated.  In the ER was alert and oriented however does not feel herself.  In the bathroom she attempted to get up by herself and did fall, striking  head on the sink without loss of consciousness.  Head CT was negative. .  Did not realize how weak she was.  Denies any fever or chills, shortness of breath or chest pain or nausea vomiting diarrhea.     Review of Systems:  Review of Systems   Constitutional:  Positive for fatigue. Negative for activity change, chills and fever.   HENT:  Negative for congestion, rhinorrhea and sore throat.    Eyes:  Negative for visual disturbance.   Respiratory:  Negative for cough, chest tightness and shortness of breath.    Cardiovascular:  Negative for chest pain and palpitations.   Gastrointestinal:  Negative for abdominal pain, constipation, diarrhea, nausea and vomiting.   Genitourinary:  Negative for difficulty urinating, dysuria, frequency and urgency.   Musculoskeletal:  Negative for arthralgias and myalgias.   Skin:  Negative for rash.   Neurological:  Positive for weakness (generalized). Negative for seizures, syncope and headaches.        Shaky, no specific tremors   Psychiatric/Behavioral:  Positive for confusion.    All other systems reviewed and are negative.      Past Medical and Surgical History:   Past Medical History:   Diagnosis Date    Carcinoid tumor     neuroendocrine    CHF (congestive heart failure) (HCC)     Cirrhosis of liver (HCC)        History reviewed. No pertinent surgical history.    Meds/Allergies:  Prior to Admission medications    Medication Sig Start Date End Date Taking? Authorizing Provider   acetaminophen (TYLENOL) 325 mg tablet Take 2 tablets (650 mg total) by mouth every 6 (six) hours as needed for fever, mild pain or headaches 6/2/24   Idalia Stuart MD   amitriptyline (ELAVIL) 75 mg tablet Take 75 mg by mouth daily at bedtime 4/8/24   Historical Provider, MD   dapsone 25 mg tablet Take 2 tablets (50 mg total) by mouth daily 6/3/24 7/3/24  Idalia Stuart MD   Ergocalciferol (VITAMIN D2 PO) Take 1 capsule by mouth once a week Mondays    Historical Provider, MD   folic acid (FOLVITE) 1 mg tablet  Take 1 mg by mouth daily 8/4/16 2/28/25  Historical Provider, MD   gabapentin (NEURONTIN) 100 mg capsule Take 1 capsule (100 mg total) by mouth 2 (two) times a day 6/2/24 7/2/24  Idalia Stuart MD   hydrocortisone 1 % cream Apply topically 2 (two) times a day 6/2/24 7/2/24  Idalia Stuart MD   hydroxychloroquine (PLAQUENIL) 200 mg tablet Take 200 mg by mouth 2 (two) times a day with meals    Historical Provider, MD   lactulose (CHRONULAC) 10 g/15 mL solution Take 10 g by mouth daily as needed (constipation)    Historical Provider, MD   oxyCODONE (ROXICODONE) 10 MG TABS Take 1 tablet (10 mg total) by mouth every 6 (six) hours as needed for moderate pain for up to 10 days Max Daily Amount: 40 mg 6/2/24 6/12/24  Idalia Stuart MD   sertraline (ZOLOFT) 100 mg tablet Take 1 tablet (100 mg total) by mouth daily 6/3/24   Idalia Stuart MD   spironolactone (ALDACTONE) 50 mg tablet Take 1 tablet (50 mg total) by mouth daily 6/3/24 7/3/24  Idalia Stuart MD   torsemide (DEMADEX) 20 mg tablet Take 1 tablet (20 mg total) by mouth 2 (two) times a day before meals 6/2/24 7/2/24  Idalia Stuart MD   Xifaxan 550 MG tablet Take 550 mg by mouth 2 (two) times a day 3/26/24 9/22/24  Historical Provider, MD MATTHEWS have reviewed home medications with patient personally.    Allergies: No Known Allergies    Social History:  Marital Status: /Civil Union   Occupation: Unknown  Patient Pre-hospital Living Situation: Home  Patient Pre-hospital Level of Mobility: walks  Patient Pre-hospital Diet Restrictions: Has been sticking to cardiac diet  Substance Use History:   Social History     Substance and Sexual Activity   Alcohol Use Never     Social History     Tobacco Use   Smoking Status Never   Smokeless Tobacco Never     Social History     Substance and Sexual Activity   Drug Use Never       Family History:  History reviewed. No pertinent family history.    Physical Exam:     Vitals:   Blood Pressure: 145/75 (06/11/24 1457)  Pulse: 81 (06/11/24  "1457)  Temperature: 97.5 °F (36.4 °C) (06/11/24 1457)  Respirations: 20 (06/11/24 1500)  Height: 5' 5\" (165.1 cm) (06/11/24 1500)  Weight - Scale: 118 kg (260 lb) (06/11/24 1500)  SpO2: 92 % (06/11/24 1457)    Physical Exam  Vitals and nursing note reviewed.   Constitutional:       General: She is not in acute distress.     Appearance: Normal appearance.   HENT:      Head: Normocephalic and atraumatic.      Nose: No congestion.      Mouth/Throat:      Mouth: Mucous membranes are moist.   Eyes:      Conjunctiva/sclera: Conjunctivae normal.   Cardiovascular:      Rate and Rhythm: Normal rate and regular rhythm.      Pulses: Normal pulses.      Heart sounds: Murmur heard.   Pulmonary:      Effort: Pulmonary effort is normal. No respiratory distress.      Breath sounds: Normal breath sounds.   Abdominal:      General: Bowel sounds are normal. There is distension.      Tenderness: There is no abdominal tenderness.   Musculoskeletal:         General: Normal range of motion.      Right lower leg: Edema (Trace) present.      Left lower leg: Edema (Trace) present.   Skin:     General: Skin is warm and dry.      Findings: Bruising (Left forehead) present.   Neurological:      Mental Status: She is alert and oriented to person, place, and time.          Additional Data:     Lab Results:  Results from last 7 days   Lab Units 06/11/24  1158   WBC Thousand/uL 2.58*   HEMOGLOBIN g/dL 12.3   HEMATOCRIT % 37.7   PLATELETS Thousands/uL 108*   SEGS PCT % 58   LYMPHO PCT % 30   MONO PCT % 7   EOS PCT % 3     Results from last 7 days   Lab Units 06/11/24  1158   SODIUM mmol/L 136   POTASSIUM mmol/L 4.0   CHLORIDE mmol/L 98   CO2 mmol/L 32   BUN mg/dL 23   CREATININE mg/dL 1.24   ANION GAP mmol/L 6   CALCIUM mg/dL 9.5   ALBUMIN g/dL 3.6   TOTAL BILIRUBIN mg/dL 1.49*   ALK PHOS U/L 250*   ALT U/L 48   AST U/L 86*   GLUCOSE RANDOM mg/dL 118     Results from last 7 days   Lab Units 06/11/24  1158   INR  1.27*         Lab Results   Component " Value Date    HGBA1C 5.9 (H) 02/27/2024    HGBA1C 5.9 08/03/2023     Results from last 7 days   Lab Units 06/11/24  1158   LACTIC ACID mmol/L 1.8       Lines/Drains:  Invasive Devices       Peripheral Intravenous Line  Duration             Peripheral IV 06/11/24 Left Antecubital <1 day                        Imaging: Reviewed radiology reports from this admission including: abdominal/pelvic CT and CT head  CT head without contrast   Final Result by Dirk Andrade MD (06/11 1420)      No acute intracranial abnormality.                  Workstation performed: [id number]         CT head without contrast   Final Result by Bright Jones MD (06/11 1253)      No acute intracranial abnormality.                  Workstation performed: [id number]         CT abdomen pelvis with contrast   Final Result by Bright Jones MD (06/11 1315)      No acute findings in the abdomen or pelvis.      Cirrhotic liver with portal hypertension and splenomegaly.         Workstation performed: [id number]         US abdomen limited    (Results Pending)       EKG and Other Studies Reviewed on Admission:   EKG: Personally Reviewed. NSR. HR 80.    ** Please Note: This note has been constructed using a voice recognition system. **

## 2024-06-11 NOTE — ASSESSMENT & PLAN NOTE
Patient experienced a fall in the ED  Still in the bathroom hitting head off sink  Repeat head CT    PT/OT consult   14-Aug-2022 01:40

## 2024-06-11 NOTE — CASE MANAGEMENT
Case Management Assessment & Discharge Planning Note    Patient name Bailey Olsen  Location /-01 MRN [id number]  : 1970 Date 2024       Current Admission Date: 2024  Current Admission Diagnosis:Hepatic encephalopathy (HCC)   Patient Active Problem List    Diagnosis Date Noted Date Diagnosed    Hepatic encephalopathy (HCC) 2024     Fall 2024     Other cirrhosis of liver (HCC) 2024     History of malignant carcinoid tumor of small intestine 2024     Class 3 severe obesity due to excess calories in adult (HCC) 2024     Primary hypertension 2024     Anxiety 2024     Hypokalemia 2024     Fibromyalgia 2024     Iron deficiency anemia 2024     Pancytopenia (HCC) 2024     Sarcoidosis 2024     Chronic diastolic (congestive) heart failure (HCC) 2024     Bilateral pulmonary infiltrates 2024       LOS (days): 0  Geometric Mean LOS (GMLOS) (days):   Days to GMLOS:     OBJECTIVE:  PATIENT READMITTED TO HOSPITAL  Risk of Unplanned Readmission Score: 17.11         Current admission status: Inpatient  Referral Reason: Other    Preferred Pharmacy:   [facility] [address] [place] [address] [address] [facility]  [address] [id number]  Phone: [phone] Fax: [phone]    Primary Care Provider: Nayely Brown DO    Primary Insurance: [facility]  Secondary Insurance:     ASSESSMENT:  Active Health Care Proxies    There are no active Health Care Proxies on file.       Advance Directives  Does patient have a Health Care POA?: No  Was patient offered paperwork?: Yes  Does patient currently have a Health Care decision maker?: Yes, please see Health Care Proxy section  Does patient have Advance Directives?: No  Was patient offered paperwork?: Yes  Primary Contact: Cristian Olsen (Spouse)  [phone]         Readmission Root Cause  30 Day Readmission: Yes  Who directed you to return to the  hospital?: Self  Did you understand whom to contact if you had questions or problems?: Yes  Did you get your prescriptions before you left the hospital?: Yes  Were you able to get your prescriptions filled when you left the hospital?: Yes  Did you take your medications as prescribed?: Yes  Were you able to get to your follow-up appointments?: Yes  During previous admission, was a post-acute recommendation made?: No    Patient Information  Admitted from:: Home  Mental Status: Alert  During Assessment patient was accompanied by: Not accompanied during assessment  Assessment information provided by:: Patient  Primary Caregiver: Self  Support Systems: Self, Children, Spouse/significant other, Family members  County of Residence: [facility]  What city do you live in?: [place]  Home entry access options. Select all that apply.: Stairs  Number of steps to enter home.: 1  Type of Current Residence: 2 story home  Upon entering residence, is there a bedroom on the main floor (no further steps)?: Yes  Upon entering residence, is there a bathroom on the main floor (no further steps)?: Yes  Living Arrangements: Lives w/ Spouse/significant other  Is patient a ?: No    Activities of Daily Living Prior to Admission  Functional Status: Independent  Completes ADLs independently?: Yes  Ambulates independently?: No  Level of ambulatory dependence: Assistance  Does patient use assisted devices?: Yes  Assisted Devices (DME) used: Walker  Does patient currently own DME?: Yes  What DME does the patient currently own?: Walker  Does patient have a history of Outpatient Therapy (PT/OT)?: No  Does the patient have a history of Short-Term Rehab?: Yes ([facility])  Does patient have a history of HHC?: Yes  Does patient currently have HHC?: No         Patient Information Continued  Income Source: SSI/SSD  Does patient have prescription coverage?: Yes  Does patient receive dialysis treatments?: No  Does patient have a history of  substance abuse?: No  Does patient have a history of Mental Health Diagnosis?: Yes  Is patient receiving treatment for mental health?: Yes  Has patient received inpatient treatment related to mental health in the last 2 years?: No         Means of Transportation  Means of Transport to Appts:: Family transport      Social Determinants of Health (SDOH)      Flowsheet Row Most Recent Value   Housing Stability    In the last 12 months, was there a time when you were not able to pay the mortgage or rent on time? N   In the past 12 months, how many times have you moved where you were living? 0   At any time in the past 12 months, were you homeless or living in a shelter (including now)? N   Transportation Needs    In the past 12 months, has lack of transportation kept you from medical appointments or from getting medications? no   In the past 12 months, has lack of transportation kept you from meetings, work, or from getting things needed for daily living? No   Food Insecurity    Within the past 12 months, you worried that your food would run out before you got the money to buy more. Never true   Within the past 12 months, the food you bought just didn't last and you didn't have money to get more. Never true   Utilities    In the past 12 months has the electric, gas, oil, or water company threatened to shut off services in your home? No            DISCHARGE DETAILS:  Cm met with pt to review needs for safe dc plan. PT/OT stephanie pending, pt not agreeable to [facility] at this time. No cm needs indicated at this time.    Discharge planning discussed with:: patient \  Freedom of Choice: Yes  Comments - Freedom of Choice: Pt not agreeable to [facility] at this time  CM contacted family/caregiver?: No- see comments  Were Treatment Team discharge recommendations reviewed with patient/caregiver?: Yes  Did patient/caregiver verbalize understanding of patient care needs?: Yes  Were patient/caregiver advised of the risks associated with not  following Treatment Team discharge recommendations?: Yes         Requested Home Health Care         Is the patient interested in HHC at discharge?: No    DME Referral Provided  Referral made for DME?: No

## 2024-06-11 NOTE — ASSESSMENT & PLAN NOTE
Chronically maintained on spironolactone 50 mg daily, torsemide 20 mg twice daily patient is vaccinated for 50 mg twice daily and lactulose 20 g as needed daily    Presenting with hepatic encephalopathy  CT abdomen pelvis -cirrhotic liver with portal hypertension and splenomegaly, no acute findings  With distended abdomen will do ascites check adominal ultrasound  Likely consult IR tomorrow for paracentesis if significant ascites

## 2024-06-11 NOTE — ED PROVIDER NOTES
"History  Chief Complaint   Patient presents with    Weakness - Generalized     Pt presented to this ED from home c/o increased weakness, \"shakiness\", confusion, and constipation over past 3-4 days. Pt requesting her ammonia level to be checked. Denies travel/sob/fevers/cough/n/v/d     Patient complaining of increasing weakness and shakes and slight confusion over the last 3 to 4 days.  History of same and states her ammonia level was high.  Is on Aldactone.  She was taken off lactulose a while ago.  Patient states she responds better to lactulose..  No fevers or chills.  Does have some lower abdominal pain.  Is constipated.  No nausea or vomiting.  No fevers or chills.  Feeling weak and shaky.  Lives alone.      History provided by:  Patient   used: No    Fatigue  Severity:  Mild  Onset quality:  Gradual  Duration:  3 days  Timing:  Constant  Progression:  Worsening  Chronicity:  New  Context: dehydration    Context: not alcohol use, not allergies, not change in medication, not pinched nerve, not stress and not urinary tract infection    Relieved by:  Nothing  Worsened by:  Nothing  Ineffective treatments:  None tried  Associated symptoms: lethargy    Associated symptoms: no abdominal pain, no anorexia, no chest pain, no cough, no diarrhea, no dizziness, no dysuria, no fever, no foul-smelling urine, no frequency, no headaches, no melena, no nausea, no seizures, no shortness of breath and no vomiting        Prior to Admission Medications   Prescriptions Last Dose Informant Patient Reported? Taking?   Ergocalciferol (VITAMIN D2 PO)   Yes No   Sig: Take 1 capsule by mouth once a week Mondays   Xifaxan 550 MG tablet   Yes No   Sig: Take 550 mg by mouth 2 (two) times a day   acetaminophen (TYLENOL) 325 mg tablet   No No   Sig: Take 2 tablets (650 mg total) by mouth every 6 (six) hours as needed for fever, mild pain or headaches   amitriptyline (ELAVIL) 75 mg tablet   Yes No   Sig: Take 75 mg by mouth " daily at bedtime   dapsone 25 mg tablet   No No   Sig: Take 2 tablets (50 mg total) by mouth daily   folic acid (FOLVITE) 1 mg tablet   Yes No   Sig: Take 1 mg by mouth daily   gabapentin (NEURONTIN) 100 mg capsule   No No   Sig: Take 1 capsule (100 mg total) by mouth 2 (two) times a day   hydrocortisone 1 % cream   No No   Sig: Apply topically 2 (two) times a day   hydroxychloroquine (PLAQUENIL) 200 mg tablet   Yes No   Sig: Take 200 mg by mouth 2 (two) times a day with meals   lactulose (CHRONULAC) 10 g/15 mL solution   Yes No   Sig: Take 10 g by mouth daily as needed (constipation)   oxyCODONE (ROXICODONE) 10 MG TABS   No No   Sig: Take 1 tablet (10 mg total) by mouth every 6 (six) hours as needed for moderate pain for up to 10 days Max Daily Amount: 40 mg   sertraline (ZOLOFT) 100 mg tablet   No No   Sig: Take 1 tablet (100 mg total) by mouth daily   spironolactone (ALDACTONE) 50 mg tablet   No No   Sig: Take 1 tablet (50 mg total) by mouth daily   torsemide (DEMADEX) 20 mg tablet   No No   Sig: Take 1 tablet (20 mg total) by mouth 2 (two) times a day before meals      Facility-Administered Medications: None       Past Medical History:   Diagnosis Date    Carcinoid tumor     neuroendocrine    CHF (congestive heart failure) (HCC)     Cirrhosis of liver (HCC)        History reviewed. No pertinent surgical history.    History reviewed. No pertinent family history.  I have reviewed and agree with the history as documented.    E-Cigarette/Vaping    E-Cigarette Use Never User      E-Cigarette/Vaping Substances     Social History     Tobacco Use    Smoking status: Never    Smokeless tobacco: Never   Vaping Use    Vaping status: Never Used   Substance Use Topics    Alcohol use: Never    Drug use: Never       Review of Systems   Constitutional:  Positive for fatigue. Negative for chills and fever.   HENT:  Negative for ear pain, hearing loss, sore throat, trouble swallowing and voice change.    Eyes:  Negative for pain  and discharge.   Respiratory:  Negative for cough, shortness of breath and wheezing.    Cardiovascular:  Negative for chest pain and palpitations.   Gastrointestinal:  Positive for constipation. Negative for abdominal pain, anorexia, blood in stool, diarrhea, melena, nausea and vomiting.   Genitourinary:  Negative for dysuria, flank pain, frequency and hematuria.   Musculoskeletal:  Negative for joint swelling, neck pain and neck stiffness.   Skin:  Negative for rash and wound.   Neurological:  Positive for weakness. Negative for dizziness, seizures, syncope, facial asymmetry and headaches.   Psychiatric/Behavioral:  Negative for hallucinations, self-injury and suicidal ideas.    All other systems reviewed and are negative.      Physical Exam  Physical Exam  Vitals and nursing note reviewed.   Constitutional:       General: She is not in acute distress.     Appearance: She is well-developed.   HENT:      Head: Normocephalic and atraumatic.      Right Ear: External ear normal.      Left Ear: External ear normal.   Eyes:      General: No scleral icterus.        Right eye: No discharge.         Left eye: No discharge.      Extraocular Movements: Extraocular movements intact.      Conjunctiva/sclera: Conjunctivae normal.   Cardiovascular:      Rate and Rhythm: Normal rate and regular rhythm.      Heart sounds: Normal heart sounds. No murmur heard.  Pulmonary:      Effort: Pulmonary effort is normal.      Breath sounds: Normal breath sounds. No wheezing or rales.   Abdominal:      General: Bowel sounds are normal. There is no distension.      Palpations: Abdomen is soft.      Tenderness: There is abdominal tenderness. There is no guarding or rebound.      Comments: Tender in the left lower quadrant.   Musculoskeletal:         General: No deformity. Normal range of motion.      Cervical back: Normal range of motion and neck supple.   Skin:     General: Skin is warm and dry.      Findings: No rash.   Neurological:       General: No focal deficit present.      Mental Status: She is alert and oriented to person, place, and time.      Cranial Nerves: No cranial nerve deficit.   Psychiatric:         Mood and Affect: Mood normal.         Behavior: Behavior normal.         Thought Content: Thought content normal.         Judgment: Judgment normal.         Vital Signs  ED Triage Vitals [06/11/24 1143]   Temperature Pulse Respirations Blood Pressure SpO2   98.1 °F (36.7 °C) 86 18 147/70 96 %      Temp src Heart Rate Source Patient Position - Orthostatic VS BP Location FiO2 (%)   -- Monitor Lying Right arm --      Pain Score       --           Vitals:    06/11/24 1143   BP: 147/70   Pulse: 86   Patient Position - Orthostatic VS: Lying         Visual Acuity  Visual Acuity      Flowsheet Row Most Recent Value   L Pupil Size (mm) 3   R Pupil Size (mm) 3            ED Medications  Medications   iohexol (OMNIPAQUE) 350 MG/ML injection (MULTI-DOSE) 100 mL (100 mL Intravenous Given 6/11/24 1237)   lactulose (CHRONULAC) oral solution 30 g (30 g Oral Given 6/11/24 1243)       Diagnostic Studies  Results Reviewed       Procedure Component Value Units Date/Time    B-Type Natriuretic Peptide(BNP) [[id number]]  (Normal) Collected: 06/11/24 1158    Lab Status: Final result Specimen: Blood from Arm, Right Updated: 06/11/24 1243     BNP 9 pg/mL     HS Troponin 0hr (reflex protocol) [[id number]]  (Normal) Collected: 06/11/24 1158    Lab Status: Final result Specimen: Blood from Arm, Left Updated: 06/11/24 1234     hs TnI 0hr 5 ng/L     Ammonia [[id number]]  (Abnormal) Collected: 06/11/24 1158    Lab Status: Final result Specimen: Blood from Arm, Left Updated: 06/11/24 1230     Ammonia 159 umol/L     Comprehensive metabolic panel [[id number]]  (Abnormal) Collected: 06/11/24 1158    Lab Status: Final result Specimen: Blood from Arm, Left Updated: 06/11/24 1228     Sodium 136 mmol/L      Potassium 4.0 mmol/L      Chloride 98 mmol/L      CO2 32 mmol/L      ANION  GAP 6 mmol/L      BUN 23 mg/dL      Creatinine 1.24 mg/dL      Glucose 118 mg/dL      Calcium 9.5 mg/dL      AST 86 U/L      ALT 48 U/L      Alkaline Phosphatase 250 U/L      Total Protein 8.4 g/dL      Albumin 3.6 g/dL      Total Bilirubin 1.49 mg/dL      eGFR 49 ml/min/1.73sq m     Narrative:      National Kid[employer] Foundation guidelines for Chronic Kidney Disease (CKD):     Stage 1 with normal or high GFR (GFR > 90 mL/min/1.73 square meters)    Stage 2 Mild CKD (GFR = 60-89 mL/min/1.73 square meters)    Stage 3A Moderate CKD (GFR = 45-59 mL/min/1.73 square meters)    Stage 3B Moderate CKD (GFR = 30-44 mL/min/1.73 square meters)    Stage 4 Severe CKD (GFR = 15-29 mL/min/1.73 square meters)    Stage 5 End Stage CKD (GFR <15 mL/min/1.73 square meters)  Note: GFR calculation is accurate only with a steady state creatinine    Magnesium [[id number]]  (Normal) Collected: 06/11/24 1158    Lab Status: Final result Specimen: Blood from Arm, Left Updated: 06/11/24 1228     Magnesium 2.1 mg/dL     Lipase [[id number]]  (Normal) Collected: 06/11/24 1158    Lab Status: Final result Specimen: Blood from Arm, Left Updated: 06/11/24 1228     Lipase 21 u/L     Lactic acid, plasma (w/reflex if result > 2.0) [[id number]]  (Normal) Collected: 06/11/24 1158    Lab Status: Final result Specimen: Blood from Arm, Left Updated: 06/11/24 1228     LACTIC ACID 1.8 mmol/L     Narrative:      Result may be elevated if tourniquet was used during collection.    Protime-INR [[id number]]  (Abnormal) Collected: 06/11/24 1158    Lab Status: Final result Specimen: Blood from Arm, Left Updated: 06/11/24 1224     Protime 16.2 seconds      INR 1.27    APTT [[id number]]  (Normal) Collected: 06/11/24 1158    Lab Status: Final result Specimen: Blood from Arm, Left Updated: 06/11/24 1224     PTT 27 seconds     CBC and differential [[id number]]  (Abnormal) Collected: 06/11/24 1158    Lab Status: Final result Specimen: Blood from Arm, Right Updated: 06/11/24  1211     WBC 2.58 Thousand/uL      RBC 3.79 Million/uL      Hemoglobin 12.3 g/dL      Hematocrit 37.7 %       fL      MCH 32.5 pg      MCHC 32.6 g/dL      RDW 13.4 %      MPV 10.2 fL      Platelets 108 Thousands/uL      nRBC 0 /100 WBCs      Segmented % 58 %      Immature Grans % 0 %      Lymphocytes % 30 %      Monocytes % 7 %      Eosinophils Relative 3 %      Basophils Relative 2 %      Absolute Neutrophils 1.49 Thousands/µL      Absolute Immature Grans 0.00 Thousand/uL      Absolute Lymphocytes 0.78 Thousands/µL      Absolute Monocytes 0.19 Thousand/µL      Eosinophils Absolute 0.08 Thousand/µL      Basophils Absolute 0.04 Thousands/µL     Blood culture #2 [[id number]] Collected: 06/11/24 1158    Lab Status: In process Specimen: Blood from Arm, Right Updated: 06/11/24 1205    Blood culture #1 [[id number]] Collected: 06/11/24 1158    Lab Status: In process Specimen: Blood from Arm, Left Updated: 06/11/24 1204    FLU/RSV/COVID - if FLU/RSV clinically relevant [[id number]] Collected: 06/11/24 1158    Lab Status: In process Specimen: Nares from Nose Updated: 06/11/24 1204                   CT head without contrast   Final Result by Bright Jones MD (06/11 1253)      No acute intracranial abnormality.                  Workstation performed: [id number]         CT abdomen pelvis with contrast   Final Result by Bright Jones MD (06/11 1315)      No acute findings in the abdomen or pelvis.      Cirrhotic liver with portal hypertension and splenomegaly.         Workstation performed: [id number]         CT head without contrast    (Results Pending)              Procedures  ECG 12 Lead Documentation Only    Date/Time: 6/11/2024 11:58 AM    Performed by: Bladimir Mcmanus MD  Authorized by: Bladimir Mcmanus MD    ECG reviewed by me, the ED Provider: yes    Patient location:  ED  Rate:     ECG rate:  80  Rhythm:     Rhythm: sinus rhythm    Ectopy:     Ectopy: none    QRS:     QRS axis:  Normal           ED  Course  ED Course as of 06/11/24 1331   Tue Jun 11, 2024   1328 Called to see patient.  Patient was in the bathroom and fell off the commode and hit her forehead off the sink.  No loss of consciousness.  Neurologic exam is nonfocal.  Neck is supple and nontender.  Will get a CAT scan of the head.                                             Medical Decision Making  Amount and/or Complexity of Data Reviewed  Labs: ordered.  Radiology: ordered.    Risk  Prescription drug management.  Decision regarding hospitalization.             Disposition  Final diagnoses:   Hepatic encephalopathy (HCC)     Time reflects when diagnosis was documented in both MDM as applicable and the Disposition within this note       Time User Action Codes Description Comment    6/11/2024 12:41 PM Bladimir Mcmanus Add [K76.82] Hepatic encephalopathy (HCC)           ED Disposition       ED Disposition   Admit    Condition   Stable    Date/Time   Tue Jun 11, 2024  1:20 PM    Comment   Case was discussed with Dr. Stuart and the patient's admission status was agreed to be Admission Status: inpatient status to the service of Dr. Stuart.               Follow-up Information    None         Patient's Medications   Discharge Prescriptions    No medications on file       No discharge procedures on file.    PDMP Review       None            ED Provider  Electronically Signed by             Bladimir Mcmanus MD  06/11/24 1321       Bladimir Mcmanus MD  06/11/24 1331

## 2024-06-12 LAB
ALBUMIN SERPL BCP-MCNC: 3.3 G/DL (ref 3.5–5)
ALP SERPL-CCNC: 214 U/L (ref 34–104)
ALT SERPL W P-5'-P-CCNC: 45 U/L (ref 7–52)
AMMONIA PLAS-SCNC: 85 UMOL/L (ref 18–72)
ANION GAP SERPL CALCULATED.3IONS-SCNC: 6 MMOL/L (ref 4–13)
AST SERPL W P-5'-P-CCNC: 83 U/L (ref 13–39)
ATRIAL RATE: 80 BPM
BILIRUB SERPL-MCNC: 1.21 MG/DL (ref 0.2–1)
BUN SERPL-MCNC: 17 MG/DL (ref 5–25)
CALCIUM ALBUM COR SERPL-MCNC: 9.4 MG/DL (ref 8.3–10.1)
CALCIUM SERPL-MCNC: 8.8 MG/DL (ref 8.4–10.2)
CHLORIDE SERPL-SCNC: 98 MMOL/L (ref 96–108)
CO2 SERPL-SCNC: 33 MMOL/L (ref 21–32)
CREAT SERPL-MCNC: 1.27 MG/DL (ref 0.6–1.3)
ERYTHROCYTE [DISTWIDTH] IN BLOOD BY AUTOMATED COUNT: 13.4 % (ref 11.6–15.1)
GFR SERPL CREATININE-BSD FRML MDRD: 47 ML/MIN/1.73SQ M
GLUCOSE SERPL-MCNC: 125 MG/DL (ref 65–140)
HCT VFR BLD AUTO: 36.2 % (ref 34.8–46.1)
HGB BLD-MCNC: 11.9 G/DL (ref 11.5–15.4)
MAGNESIUM SERPL-MCNC: 1.9 MG/DL (ref 1.9–2.7)
MCH RBC QN AUTO: 33 PG (ref 26.8–34.3)
MCHC RBC AUTO-ENTMCNC: 32.9 G/DL (ref 31.4–37.4)
MCV RBC AUTO: 100 FL (ref 82–98)
P AXIS: 57 DEGREES
PLATELET # BLD AUTO: 87 THOUSANDS/UL (ref 149–390)
PMV BLD AUTO: 10.1 FL (ref 8.9–12.7)
POTASSIUM SERPL-SCNC: 3.9 MMOL/L (ref 3.5–5.3)
PR INTERVAL: 196 MS
PROT SERPL-MCNC: 7.7 G/DL (ref 6.4–8.4)
QRS AXIS: 2 DEGREES
QRSD INTERVAL: 108 MS
QT INTERVAL: 424 MS
QTC INTERVAL: 489 MS
RBC # BLD AUTO: 3.61 MILLION/UL (ref 3.81–5.12)
SODIUM SERPL-SCNC: 137 MMOL/L (ref 135–147)
T WAVE AXIS: 25 DEGREES
VENTRICULAR RATE: 80 BPM
WBC # BLD AUTO: 2.9 THOUSAND/UL (ref 4.31–10.16)

## 2024-06-12 PROCEDURE — 97167 OT EVAL HIGH COMPLEX 60 MIN: CPT

## 2024-06-12 PROCEDURE — 80053 COMPREHEN METABOLIC PANEL: CPT

## 2024-06-12 PROCEDURE — 97163 PT EVAL HIGH COMPLEX 45 MIN: CPT

## 2024-06-12 PROCEDURE — 97116 GAIT TRAINING THERAPY: CPT

## 2024-06-12 PROCEDURE — 97535 SELF CARE MNGMENT TRAINING: CPT

## 2024-06-12 PROCEDURE — 99232 SBSQ HOSP IP/OBS MODERATE 35: CPT | Performed by: FAMILY MEDICINE

## 2024-06-12 PROCEDURE — 83735 ASSAY OF MAGNESIUM: CPT

## 2024-06-12 PROCEDURE — 82140 ASSAY OF AMMONIA: CPT | Performed by: FAMILY MEDICINE

## 2024-06-12 PROCEDURE — 85027 COMPLETE CBC AUTOMATED: CPT

## 2024-06-12 RX ORDER — LACTULOSE 10 G/15ML
20 SOLUTION ORAL 4 TIMES DAILY
Status: DISCONTINUED | OUTPATIENT
Start: 2024-06-12 | End: 2024-06-13

## 2024-06-12 RX ORDER — LACTULOSE 10 G/15ML
20 SOLUTION ORAL ONCE
Status: COMPLETED | OUTPATIENT
Start: 2024-06-12 | End: 2024-06-12

## 2024-06-12 RX ORDER — LACTULOSE 10 G/15ML
30 SOLUTION ORAL 4 TIMES DAILY
Status: DISCONTINUED | OUTPATIENT
Start: 2024-06-12 | End: 2024-06-12

## 2024-06-12 RX ORDER — POLYETHYLENE GLYCOL 3350 17 G/17G
17 POWDER, FOR SOLUTION ORAL DAILY
Status: DISCONTINUED | OUTPATIENT
Start: 2024-06-12 | End: 2024-06-18 | Stop reason: HOSPADM

## 2024-06-12 RX ADMIN — RIFAXIMIN 550 MG: 550 TABLET ORAL at 17:00

## 2024-06-12 RX ADMIN — OXYCODONE HYDROCHLORIDE 10 MG: 10 TABLET ORAL at 05:11

## 2024-06-12 RX ADMIN — OXYCODONE HYDROCHLORIDE 10 MG: 10 TABLET ORAL at 13:34

## 2024-06-12 RX ADMIN — OXYCODONE HYDROCHLORIDE 10 MG: 10 TABLET ORAL at 22:43

## 2024-06-12 RX ADMIN — FOLIC ACID 1 MG: 1 TABLET ORAL at 08:59

## 2024-06-12 RX ADMIN — RIFAXIMIN 550 MG: 550 TABLET ORAL at 08:59

## 2024-06-12 RX ADMIN — HYDROCORTISONE: 1 CREAM TOPICAL at 09:05

## 2024-06-12 RX ADMIN — LACTULOSE 20 G: 20 SOLUTION ORAL at 11:56

## 2024-06-12 RX ADMIN — POLYETHYLENE GLYCOL 3350 17 G: 17 POWDER, FOR SOLUTION ORAL at 11:56

## 2024-06-12 RX ADMIN — LACTULOSE 20 G: 20 SOLUTION ORAL at 08:59

## 2024-06-12 RX ADMIN — GABAPENTIN 100 MG: 100 CAPSULE ORAL at 17:00

## 2024-06-12 RX ADMIN — LACTULOSE 20 G: 20 SOLUTION ORAL at 22:43

## 2024-06-12 RX ADMIN — HYDROXYCHLOROQUINE SULFATE 200 MG: 200 TABLET, FILM COATED ORAL at 08:59

## 2024-06-12 RX ADMIN — ONDANSETRON 4 MG: 2 INJECTION INTRAMUSCULAR; INTRAVENOUS at 09:08

## 2024-06-12 RX ADMIN — HYDROCORTISONE 1 APPLICATION: 1 CREAM TOPICAL at 17:00

## 2024-06-12 RX ADMIN — AMITRIPTYLINE HYDROCHLORIDE 75 MG: 25 TABLET, FILM COATED ORAL at 22:43

## 2024-06-12 RX ADMIN — GABAPENTIN 100 MG: 100 CAPSULE ORAL at 08:59

## 2024-06-12 RX ADMIN — HEPARIN SODIUM 5000 UNITS: 5000 INJECTION, SOLUTION INTRAVENOUS; SUBCUTANEOUS at 05:12

## 2024-06-12 RX ADMIN — SPIRONOLACTONE 50 MG: 25 TABLET ORAL at 08:59

## 2024-06-12 RX ADMIN — DAPSONE 50 MG: 25 TABLET ORAL at 09:05

## 2024-06-12 RX ADMIN — SERTRALINE HYDROCHLORIDE 100 MG: 100 TABLET ORAL at 08:59

## 2024-06-12 RX ADMIN — HYDROXYCHLOROQUINE SULFATE 200 MG: 200 TABLET, FILM COATED ORAL at 16:58

## 2024-06-12 RX ADMIN — LACTULOSE 20 G: 20 SOLUTION ORAL at 17:00

## 2024-06-12 RX ADMIN — TORSEMIDE 20 MG: 20 TABLET ORAL at 16:58

## 2024-06-12 RX ADMIN — LACTULOSE 20 G: 20 SOLUTION ORAL at 14:28

## 2024-06-12 RX ADMIN — TORSEMIDE 20 MG: 20 TABLET ORAL at 08:59

## 2024-06-12 NOTE — ARC ADMISSION
[facility]  contacted the patient and or family: introduced self, explained role, reviewed ARC program, services offered, acute rehab criteria, approval process, insurance authorization process, ARC locations and preferences. Patient / family requesting the  look for Acute Rehab location closer to [place]. Patient lives closest to [facility] 35 mins away. No campus choice was given at this time. Patient / family made aware ARC Reviewer will keep their Care Manager updated regarding referral status.      Thank you,  Ashely Howell  [facility] Admissions

## 2024-06-12 NOTE — PLAN OF CARE
Problem: PHYSICAL THERAPY ADULT  Goal: Performs mobility at highest level of function for planned discharge setting.  See evaluation for individualized goals.  Description: Treatment/Interventions: ADL retraining, Functional transfer training, LE strengthening/ROM, Elevations, Therapeutic exercise, Endurance training, Patient/family training, Equipment eval/education, Bed mobility, Gait training, Compensatory technique education, Spoke to nursing, Spoke to case management, OT          See flowsheet documentation for full assessment, interventions and recommendations.  Note: Prognosis: Good  Problem List: Decreased strength, Decreased endurance, Impaired balance, Decreased mobility, Impaired judgement, Decreased safety awareness, Obesity, Pain  Assessment: Pt is a 53 y.o. female seen for PT evaluation s/p admission to [facility] on 6/11/2024 with Hepatic encephalopathy (HCC).  Order placed for PT services.  Upon evaluation: Pt is presenting with impaired functional mobility due to pain, decreased strength, decreased endurance, impaired balance, gait deviations, altered sensation, decreased safety awareness, impaired judgment, fall risk, and LE edema requiring  minimal assistance for bed mobility, moderate to minimal assistance for transfers, and moderate to minimal assistance for ambulation with RW . Pt's clinical presentation is currently unpredictable given the functional mobility deficits above, especially weakness, edema of extremities, decreased endurance, impaired balance, gait deviations, pain, decreased activity tolerance, decreased functional mobility tolerance, decreased safety awareness, and impaired judgement, coupled with fall risks as indicated by AM-PAC 6-Clicks: 17/24 as well as hx of falls, impaired balance, polypharmacy, impaired judgement, decreased safety awareness, limited sensation/neuropathy, and obesity and combined with medical complications of abnormal WBCs, need for  input for mobility technique/safety, and hepatic encephalopathy, Cirrhotic liver with portal hypertension and splenomegaly on CT abdomen and pelvis, abnormal ammonia  .  Pt's PMHx and comorbidities that may affect physical performance and progress include: CHF, obesity, and cirrhosis of liver, pancytopenia, sarcoidosis, carcinoid tumor . Personal factors affecting pt at time of IE include: step(s) to enter environment, limited home support, inability to perform IADLs, inability to perform ADLs, inability to navigate level surfaces without external assistance, inability to ambulate household distances, and recent fall(s)/fall history. Pt will benefit from continued skilled PT services to address deficits as defined above and to maximize level of functional mobility to facilitate return toward PLOF and improved QOL. From PT/mobility standpoint, recommendation at time of d/c would be Level I (Maximum Resource Intensity) in order to reduce fall risk and maximize pt's functional independence and consistency with mobility. Recommend trial with walker next 1-2 sessions and ther ex next 1-2 sessions.  Barriers to Discharge: Inaccessible home environment  Barriers to Discharge Comments: pt has support form family, however is home alone during the day. increased fall risk, recent fall, requires asisstance to complete mobility  Rehab Resource Intensity Level, PT: I (Maximum Resource Intensity)    See flowsheet documentation for full assessment.

## 2024-06-12 NOTE — PLAN OF CARE
Problem: PHYSICAL THERAPY ADULT  Goal: Performs mobility at highest level of function for planned discharge setting.  See evaluation for individualized goals.  Description: Treatment/Interventions: ADL retraining, Functional transfer training, LE strengthening/ROM, Elevations, Therapeutic exercise, Endurance training, Patient/family training, Equipment eval/education, Bed mobility, Gait training, Compensatory technique education, Spoke to nursing, Spoke to case management, OT          See flowsheet documentation for full assessment, interventions and recommendations.  6/12/2024 1341 by Dianna Lawler PT  Outcome: Progressing  Note: Prognosis: Good  Problem List: Decreased strength, Decreased endurance, Impaired balance, Decreased mobility, Impaired judgement, Decreased safety awareness, Obesity, Pain  Pt tolerated session fairly. she requires icnreased time to process and complete mobility. She requires cues for safety and improved tehcnique with transfers and gait pattern. She is able to ambulate increased distance with decreasing assistance, but fatigues easily. She will continue to benefit from PTs ervices to [facility].     Barriers to Discharge: Inaccessible home environment  Barriers to Discharge Comments: pt has support form family, however is home alone during the day. increased fall risk, recent fall, requires asisstance to complete mobility  Rehab Resource Intensity Level, PT: I (Maximum Resource Intensity)    See flowsheet documentation for full assessment.

## 2024-06-12 NOTE — CASE MANAGEMENT
Case Management Discharge Planning Note    Patient name Bailey Olsen  Location /-01 MRN [id number]  : 1970 Date 2024       Current Admission Date: 2024  Current Admission Diagnosis:Hepatic encephalopathy (HCC)   Patient Active Problem List    Diagnosis Date Noted Date Diagnosed    Hepatic encephalopathy (HCC) 2024     Fall 2024     Other cirrhosis of liver (HCC) 2024     History of malignant carcinoid tumor of small intestine 2024     Class 3 severe obesity due to excess calories in adult (HCC) 2024     Primary hypertension 2024     Anxiety 2024     Hypokalemia 2024     Fibromyalgia 2024     Iron deficiency anemia 2024     Pancytopenia (HCC) 2024     Sarcoidosis 2024     Chronic diastolic (congestive) heart failure (HCC) 2024     Bilateral pulmonary infiltrates 2024       LOS (days): 1  Geometric Mean LOS (GMLOS) (days):   Days to GMLOS:     OBJECTIVE:  Risk of Unplanned Readmission Score: 17.68         Current admission status: Inpatient   Preferred Pharmacy:   [facility] [address] [place] [address] [address] [facility]  [address] [facility] [address] [facility] [address]  Phone: [phone] Fax: [phone]    Primary Care Provider: Nayely Brown DO    Primary Insurance: [facility]  Secondary Insurance:     DISCHARGE DETAILS:  Cm spoke to pt and pt's sister about therapy's recommendation of acute rehab. [place] of choice given, pt has no preference. Pt is agreeable for blanket referral. Pt's sister requested a list of rehab facilities to research on her own. Sister later requested the [place] search stretch to a 45 mile radius to see more rehab options.

## 2024-06-12 NOTE — ASSESSMENT & PLAN NOTE
Chronically maintained on spironolactone 50 mg daily, torsemide 20 mg twice daily patient is vaccinated for 50 mg twice daily and lactulose 20 g as needed daily    Presenting with hepatic encephalopathy  CT abdomen pelvis -cirrhotic liver with portal hypertension and splenomegaly, no acute findings  With distended abdomen secondary to constipation.  No ascites noted

## 2024-06-12 NOTE — UTILIZATION REVIEW
"    Initial Clinical Review    Admission: Date/Time/Statement:   Admission Orders (From admission, onward)       Ordered        06/11/24 1321  INPATIENT ADMISSION  Once                          Orders Placed This Encounter   Procedures    INPATIENT ADMISSION     Standing Status:   Standing     Number of Occurrences:   1     Order Specific Question:   Level of Care     Answer:   Med Surg [16]     Order Specific Question:   Estimated length of stay     Answer:   More than 2 Midnights     Order Specific Question:   Certification     Answer:   I certify that inpatient services are medically necessary for this patient for a duration of greater than two midnights. See H&P and MD Progress Notes for additional information about the patient's course of treatment.     ED Arrival Information       Expected   -    Arrival   6/11/2024 11:34    Acuity   Emergent              Means of arrival   Wheelchair    Escorted by   Family Member    Service   Hospitalist    Admission type   Emergency              Arrival complaint   Shaking, constipation             Chief Complaint   Patient presents with    Weakness - Generalized     Pt presented to this ED from home c/o increased weakness, \"shakiness\", confusion, and constipation over past 3-4 days. Pt requesting her ammonia level to be checked. Denies travel/sob/fevers/cough/n/v/d       Initial Presentation: 53 y.o. female presents to ed from home with family on 6-11-24  for evaluation and treatment of increased weakness, shakiness, confusion and constipation for 3-4-days. PMHX: CARCINOID TUMOR, CHF, CIRRHOSIS OF LIVER   Patient is not longer on lactulose. She is on Aldactone but thinks lactulose worked better for her.  She has not taken lactulose for a week.   In ed patient yelling from bathroom and found sitting on the floor. Said she slipped and struck her head on a sink that is approximately 6 feet away.  Now complains of dizziness and headache. Neuro exam is non focal.   Imaging " without acute finding.  AMMONIA 159, WBC 2.58.   Initially treated with lactulose and iv mso4 x1.  Admit to inpatient for acute hepatic encephalopathy with ammonia level 159.      Anticipated Length of Stay/Certification Statement: Patient will be admitted on an inpatient basis with an anticipated length of stay of greater than 2 midnights secondary to hepatic encephalopathy.     Date: 6- 12-24    Day 2: Certification Statement: The patient will continue to require additional inpatient hospital stay due to hepatic encephalopathy.  Patient remains confused.  She has had 1 or 2 medium sized bowel movements but needs more in order to clear the encephalopathy.  US: no ascites.  Continue lactulose 4x daily.  Trend ammonia.  Continue neuro checks.        ED Triage Vitals   Temperature Pulse Respirations Blood Pressure SpO2 Pain Score   06/11/24 1143 06/11/24 1143 06/11/24 1143 06/11/24 1143 06/11/24 1143 06/11/24 1330   98.1 °F (36.7 °C) 86 18 147/70 96 % 9     Weight (last 2 days)       Date/Time Weight    06/11/24 1500 118 (260)    06/11/24 1143 118 (260)            Vital Signs (last 3 days)       Date/Time Temp Pulse Resp BP MAP (mmHg) SpO2 O2 Device [place] Coma Scale Score Pain    06/12/24 07:25:22 97.5 °F (36.4 °C) -- 22 144/88 107 -- -- -- --    06/12/24 0511 -- -- -- -- -- -- -- -- 6    06/11/24 23:12:21 97.7 °F (36.5 °C) 88 -- 108/65 79 90 % -- -- --    06/11/24 2300 -- -- -- -- -- 90 % None (Room air) 15 --    06/11/24 2238 -- -- -- -- -- -- -- -- 8 06/11/24 2010 -- -- -- -- -- -- -- -- 7 06/11/24 1628 -- -- -- -- -- -- -- -- 8 06/11/24 1521 -- -- -- -- -- -- -- 15 --    06/11/24 1500 -- -- 20 -- -- -- -- -- --    06/11/24 14:57:15 97.5 °F (36.4 °C) 81 20 145/75 98 92 % -- -- --    06/11/24 1345 -- 78 -- -- -- 98 % -- -- --    06/11/24 1342 -- -- -- -- -- -- -- -- 8    06/11/24 1330 -- 63 18 185/86 124 96 % None (Room air) -- 9    06/11/24 1205 -- -- -- -- -- -- -- 15 --    06/11/24 1143 98.1 °F (36.7  °C) 86 18 147/70 101 96 % None (Room air) -- --              Pertinent Labs/Diagnostic Test Results:   Radiology:  US abdomen limited   Final  (06/12 0448)      No ascites.         CT head without contrast   Final  (06/11 1420)      No acute intracranial abnormality.         CT head without contrast   Final (06/11 1253)      No acute intracranial abnormality.      CT abdomen pelvis with contrast   Final  (06/11 1315)      No acute findings in the abdomen or pelvis.      Cirrhotic liver with portal hypertension and splenomegaly.        Cardiology:  No orders to display     GI:  No orders to display       Results from last 7 days   Lab Units 06/11/24  1158   SARS-COV-2  Negative     Results from last 7 days   Lab Units 06/12/24  0514 06/11/24  1158   WBC Thousand/uL 2.90* 2.58*   HEMOGLOBIN g/dL 11.9 12.3   HEMATOCRIT % 36.2 37.7   PLATELETS Thousands/uL 87* 108*   TOTAL NEUT ABS Thousands/µL  --  1.49*         Results from last 7 days   Lab Units 06/12/24  0514 06/11/24  1158   SODIUM mmol/L 137 136   POTASSIUM mmol/L 3.9 4.0   CHLORIDE mmol/L 98 98   CO2 mmol/L 33* 32   ANION GAP mmol/L 6 6   BUN mg/dL 17 23   CREATININE mg/dL 1.27 1.24   EGFR ml/min/1.73sq m 47 49   CALCIUM mg/dL 8.8 9.5   MAGNESIUM mg/dL 1.9 2.1     Results from last 7 days   Lab Units 06/12/24  0514 06/11/24  1158   AST U/L 83* 86*   ALT U/L 45 48   ALK PHOS U/L 214* 250*   TOTAL PROTEIN g/dL 7.7 8.4   ALBUMIN g/dL 3.3* 3.6   TOTAL BILIRUBIN mg/dL 1.21* 1.49*   AMMONIA umol/L  --  159*         Results from last 7 days   Lab Units 06/12/24  0514 06/11/24  1158   GLUCOSE RANDOM mg/dL 125 118     Results from last 7 days   Lab Units 06/11/24  1158   HS TNI 0HR ng/L 5         Results from last 7 days   Lab Units 06/11/24  1158   PROTIME seconds 16.2*   INR  1.27*   PTT seconds 27     Results from last 7 days   Lab Units 06/11/24  1158   LACTIC ACID mmol/L 1.8             Results from last 7 days   Lab Units 06/11/24  1158   BNP pg/mL 9     Results  from last 7 days   Lab Units 06/11/24  1158   LIPASE u/L 21     Results from last 7 days   Lab Units 06/11/24  1158   INFLUENZA A PCR  Negative   INFLUENZA B PCR  Negative   RSV PCR  Negative     Results from last 7 days   Lab Units 06/11/24  1158   BLOOD CULTURE  Received in Microbiology Lab. Culture in Progress.    Received in Microbiology Lab. Culture in Progress.     ED Treatment-Medication Administration from 06/11/2024 1133 to 06/11/2024 1452         Date/Time Order Dose Route Action     06/11/2024 1243 lactulose (CHRONULAC) oral solution 30 g 30 g Oral Given     06/11/2024 1342 morphine injection 2 mg 2 mg Intravenous Given            Past Medical History:   Diagnosis Date    Carcinoid tumor     neuroendocrine    CHF (congestive heart failure) (HCC)     Cirrhosis of liver (HCC)      Present on Admission:   Class 3 severe obesity due to excess calories in adult (HCC)   Chronic diastolic (congestive) heart failure (HCC)   Sarcoidosis   Other cirrhosis of liver (HCC)   Pancytopenia (HCC)      Admitting Diagnosis:     Hepatic encephalopathy (HCC) [K76.82]  Weakness generalized [R53.1]    Age/Sex: 53 y.o. female    Scheduled Medications:    amitriptyline, 75 mg, Oral, HS  dapsone, 50 mg, Oral, Daily  folic acid, 1 mg, Oral, Daily  gabapentin, 100 mg, Oral, BID  hydrocortisone, , Topical, BID  hydroxychloroquine, 200 mg, Oral, BID With Meals  lactulose, 20 g, Oral, 4x Daily  polyethylene glycol, 17 g, Oral, Daily  rifaximin, 550 mg, Oral, BID  sertraline, 100 mg, Oral, Daily  spironolactone, 50 mg, Oral, Daily  torsemide, 20 mg, Oral, BID AC      Continuous IV Infusions:     PRN Meds:  acetaminophen, 650 mg, Oral, Q6H PRN  ondansetron, 4 mg, Intravenous, Q6H PRN  oxyCODONE, 10 mg, Oral, Q6H PRN        None    Network Utilization Review Department  ATTENTION: Please call with any questions or concerns to [phone] and carefully listen to the prompts so that you are directed to the right person. All voicemails  are confidential.   For Discharge needs, contact Care Management DC Support Team at [phone] opt. 2  Send all requests for admission clinical reviews, approved or denied determinations and any other requests to dedicated fax number below belonging to the campus where the patient is receiving treatment. List of dedicated fax numbers for the Facilities:  FACILITY NAME UR FAX NUMBER   ADMISSION DENIALS (Administrative/Medical Necessity) [phone]   DISCHARGE SUPPORT TEAM (NETWORK) [phone]   PARENT CHILD HEALTH (Maternity/NICU/Pediatrics) [phone]   [facility] [phone]   [facility] [phone]   [facility] [phone]   [facility] [phone]   [facility] [phone]   [facility] [phone]   [facility] [phone]   [facility] [phone]   [facility] [phone]   [facility] [phone]   [facility] [phone]   [facility] [phone]

## 2024-06-12 NOTE — PLAN OF CARE
Problem: OCCUPATIONAL THERAPY ADULT  Goal: Performs self-care activities at highest level of function for planned discharge setting.  See evaluation for individualized goals.  Description: Treatment Interventions: ADL retraining, Functional transfer training, Endurance training, Patient/family training, UE strengthening/ROM, Compensatory technique education, Continued evaluation, Energy conservation, Activityengagement          See flowsheet documentation for full assessment, interventions and recommendations.   Note: Limitation: Decreased ADL status, Decreased UE ROM, Decreased UE strength, Decreased endurance, Decreased self-care trans, Decreased high-level ADLs  Prognosis: Good  Assessment: Pt is a 53 y.o. female seen for OT evaluation s/p adm to [facility] on 6/11/2024 w/ Hepatic encephalopathy (HCC) . Comorbidities affecting pt’s functional performance include a significant PMH of carcinoid tumor, CHF, cirrhosis of liver, pancytopenia. Pt with active OT orders and activity orders for Up and OOB as tolerated. Pt lives in a 2SH with 1 FABRICIO with spouse and daughter. Pt lives on main level of house with tub/shower and standard toilet. At baseline, pt was independent with all ADLs/IADLs except driving. Pt completed supine to sit with HOB flat with minAx1. Pt completed sit to stand with minAx1 and use of RW. Pt completed functional mobility from EOB to bathroom with modAx1 and use of RW. See additional treatment session focusing on ADLs, transfers, patient education, and functional mobility. Upon evaluation, pt currently requires supervision for UB ADLs, maxA for LB ADLs, Edyta for toileting, Edyta for bed mobility, modA-Edyta for functional mobility, and Edyta for transfers 2* the following deficits impacting occupational performance: weakness, decreased UE ROM, decreased strength , decreased balance, decreased activity tolerance, limited functional reach, increased pain, and lethargy . These impairments, as  well at pt’s personal factors of: FABRICIO home environment, steps within home environment, difficulty performing ADLs, difficulty performing IADLs, difficulty performing transfers/mobility, and fall risk  limit pt’s ability to safely engage in all baseline areas of occupation. Based on the aforementioned OT evaluation, functional performance deficits, and assessments, pt has been identified as a high complexity evaluation. Pt to continue to benefit from continued acute OT services during hospital stay to address defined deficits and to maximize level of functional independence in the following Occupational Performance areas: eating, grooming, bathing/shower, toilet hygiene, dressing, health maintenance, functional mobility, community mobility, clothing management, cleaning, and household maintenance. From OT standpoint, recommend maximum resource intensity upon D/C. OT will continue to follow pt 2-3x/wk.     Rehab Resource Intensity Level, OT: I (Maximum Resource Intensity)

## 2024-06-12 NOTE — PHYSICAL THERAPY NOTE
PHYSICAL THERAPY EVALUATION  NAME:  Bailey Olsen  DATE: 06/12/24    AGE:   53 y.o.  Mrn:   [id number]  ADMIT DX:  Hepatic encephalopathy (HCC) [K76.82]  Weakness generalized [R53.1]    Past Medical History:   Diagnosis Date    Carcinoid tumor     neuroendocrine    CHF (congestive heart failure) (HCC)     Cirrhosis of liver (HCC)      Length Of Stay: 1  Performed at least 2 patient identifiers during session: Name and Birthday  PHYSICAL THERAPY EVALUATION :      06/12/24 1032   PT Last Visit   PT Visit Date 06/12/24   Note Type   Note type Evaluation   Pain Assessment   Pain Assessment Tool FLACC   Pain Location/Orientation Location: Abdomen;Location: Back;Location: Head   Pain Rating: FLACC (Rest) - Face 0   Pain Rating: FLACC (Rest) - Legs 0   Pain Rating: FLACC (Rest) - Activity 0   Pain Rating: FLACC (Rest) - Cry 0   Pain Rating: FLACC (Rest) - Consolability 0   Score: FLACC (Rest) 0   Pain Rating: FLACC (Activity) - Face 1   Pain Rating: FLACC (Activity) - Legs 0   Pain Rating: FLACC (Activity) - Activity 0   Pain Rating: FLACC (Activity) - Cry 1   Pain Rating: FLACC (Activity) - Consolability 0   Score: FLACC (Activity) 2   Restrictions/Precautions   Other Precautions Chair Alarm;Bed Alarm;Fall Risk;Pain   Home Living   Type of Home House  (1 FABRICIO)   Home Layout Two level;Performs ADLs on one level;Able to live on main level with bedroom/bathroom   Bathroom Shower/Tub Tub/shower unit   Bathroom Toilet Standard   Bathroom Equipment Grab bars in shower;Grab bars around toilet   Home Equipment Walker  (doesn't use an AD)   Additional Comments Reports living in a 2SH with 1 FABRICIO and 1st floor set up. not using an AD at home, but at times a walker.   Prior Function   Level of [place] Independent with ADLs;Independent with functional mobility;Needs assistance with IADLS   Lives With Spouse;Daughter   Receives Help From Family   IADLs Family/Friend/Other provides transportation;Independent with meal  "prep;Independent with medication management   Falls in the last 6 months 1 to 4  (1 fall)   Comments Reports being independent with mobility, ADLs and IADLs.   General   Additional Pertinent History B LEs edema, fall in ED.   Cognition   Orientation Level Oriented to person;Oriented to place;Oriented to situation  (partially to tiome with month, not year \"2004\")   Following Commands Follows one step commands with increased time or repetition   Comments increased time to processa nd caryr out commands. pt reporting difficlty thinking and processing   Subjective   Subjective \"I have tremors when my values are off.\"   RLE Assessment   RLE Assessment WFL  (2+/5 at hip, 3-/5 knee and ankle 3+/5)   LLE Assessment   LLE Assessment WFL  (2+/5 at hip, 3-/5 knee and ankle 3+/5)   Light Touch   RLE Light Touch Impaired   RLE Light Touch Comments reports hypersensitivity distally   LLE Light Touch Impaired   LLE Light Touch Comments reports hypersensitivity distally   Bed Mobility   Supine to Sit   (SBA to sit upright but minAx1 to scoot anteriorly)   Additional items Assist x 1;Increased time required;Verbal cues   Additional Comments HOB flat without bedrail. mod cues for rolling and tehcnique. increased time and effor tot complete requiring SBA to achieve sitting, but miNAx1 to scoot anteriorly to EOB for foot placement on floor.   Transfers   Sit to Stand 3  Moderate assistance   Additional items Assist x 1;Increased time required;Verbal cues   Stand to Sit 4  Minimal assistance   Additional items Assist x 1;Increased time required;Verbal cues   Stand pivot   (mod-->minAx1)   Additional items Assist x 1;Increased time required;Verbal cues   Additional Comments attempted without RW. required modAx1 to stand uprihgt with manual cues for ant wt shfiting, cues forhand placement to push from bed. unable to step without UE support. intiiated RW. spt with RW with modAx1--->minAx1 with manual cues for wt shifting and balance and " "mod verbal cues for tunring completely prior to sitting. stand to sit wiht minAx1 with manaul cues for controlled descent.   Ambulation/Elevation   Gait pattern Wide DENICE;Improper Weight shift;Decreased foot clearance;Short stride;Excessively slow   Gait Assistance   (mod-->minAx1)   Additional items Assist x 1;Verbal cues   Assistive Device Rolling walker   Distance ambulated 14'x2 wiht RW with mod-->minAx1 with wide DENICE, decreased step length, foot clearance and cues for staying wihtin DENICE of RW and inc step length.   Balance   Static Sitting Good   Dynamic Sitting Fair +   Static Standing Fair -   Dynamic Standing Poor +   Ambulatory Poor  (poor to poor +)   Endurance Deficit   Endurance Deficit   (fatigue)   Activity Tolerance   Activity Tolerance Patient limited by fatigue   Medical Staff Made Aware Brianna DAVALOS   Nurse Made Aware RNMeera   Assessment   Prognosis Good   Problem List Decreased strength;Decreased endurance;Impaired balance;Decreased mobility;Impaired judgement;Decreased safety awareness;Obesity;Pain   Barriers to Discharge Inaccessible home environment   Barriers to Discharge Comments pt has support form family, however is home alone during the day. increased fall risk, recent fall, requires asisstance to complete mobility   Goals   Patient Goals \"Go home\"   [facility] Expiration Date 06/26/24   PT Treatment Day 1   Plan   Treatment/Interventions ADL retraining;Functional transfer training;LE strengthening/ROM;Elevations;Therapeutic exercise;Endurance training;Patient/family training;Equipment eval/education;Bed mobility;Gait training;Compensatory technique education;Spoke to nursing;Spoke to case management;OT   PT Frequency 3-5x/wk   Discharge Recommendation   Rehab Resource Intensity Level, PT I (Maximum Resource Intensity)   AM-PAC Basic Mobility Inpatient   Turning in Flat Bed Without Bedrails 3   Lying on Back to Sitting on Edge of Flat Bed Without Bedrails 3   Moving Bed to Chair 3   Standing Up From " Chair Using Arms 3   Walk in Room 3   Climb 3-5 Stairs With Railing 2   Basic Mobility Inpatient Raw Score 17   Basic Mobility Standardized Score 39.67   [facility] Highest Level Of Mobility   -HL Goal 5: Stand one or more mins   -[facility] Achieved 7: Walk 25 feet or more   Additional Treatment Session   Start Time 1055   End Time 1112   Treatment Assessment Pt tolerated session fairly. she requires icnreased time to process and complete mobility. She requires cues for safety and improved tehcnique with transfers and gait pattern. She is able to ambulate increased distance with decreasing assistance, but fatigues easily. She will continue to benefit from PTs ervices to maximzie LOF.   Equipment Use use of RW. sit to stand with minAx1 with inc time wiht cues for hand palcement for saftey with RW. spt with RW with minimal to steadying asisstance. dec foot clearance and cues for turning completely with RW throughout. ambulated 75'x1 wiht RW with minimal to steaydign assistance with wide DENICE, decreased step length, foot clearance and cues to stay wihtin DENICE of RW. pt limited by fatigue. completed 1x10 ankle F/PF, quad and glut sets. discussed current LOF and recommendatioj upon discharge. pt prefers to return to home however is considering rehab pending her progress. She reports difficulty processing infomration and requiring icnreased time to respond.   End of Consult   Patient Position at End of Consult Bedside chair;Bed/Chair alarm activated;All needs within reach        Pt requires PT/OT co-eval due to medical complexity, safety concerns, fall risk, significant assistance with mobility and/or cognitive-behavioral impairments.    (Please find full objective findings from PT assessment regarding body systems outlined above).     Assessment: Pt is a 53 y.o. female seen for PT evaluation s/p admission to [facility] on 6/11/2024 with Hepatic encephalopathy (HCC).  Order placed for PT services.  Upon  evaluation: Pt is presenting with impaired functional mobility due to pain, decreased strength, decreased endurance, impaired balance, gait deviations, altered sensation, decreased safety awareness, impaired judgment, fall risk, and LE edema requiring  minimal assistance for bed mobility, moderate to minimal assistance for transfers, and moderate to minimal assistance for ambulation with RW . Pt's clinical presentation is currently unpredictable given the functional mobility deficits above, especially weakness, edema of extremities, decreased endurance, impaired balance, gait deviations, pain, decreased activity tolerance, decreased functional mobility tolerance, decreased safety awareness, and impaired judgement, coupled with fall risks as indicated by AM-PAC 6-Clicks: 17/24 as well as hx of falls, impaired balance, polypharmacy, impaired judgement, decreased safety awareness, limited sensation/neuropathy, and obesity and combined with medical complications of abnormal WBCs, need for input for mobility technique/safety, and hepatic encephalopathy, Cirrhotic liver with portal hypertension and splenomegaly on CT abdomen and pelvis, abnormal ammonia  .  Pt's PMHx and comorbidities that may affect physical performance and progress include: CHF, obesity, and cirrhosis of liver, pancytopenia, sarcoidosis, carcinoid tumor . Personal factors affecting pt at time of IE include: step(s) to enter environment, limited home support, inability to perform IADLs, inability to perform ADLs, inability to navigate level surfaces without external assistance, inability to ambulate household distances, and recent fall(s)/fall history. Pt will benefit from continued skilled PT services to address deficits as defined above and to maximize level of functional mobility to facilitate return toward PLOF and improved QOL. From PT/mobility standpoint, recommendation at time of d/c would be Level I (Maximum Resource Intensity) in order to reduce  fall risk and maximize pt's functional independence and consistency with mobility. Recommend trial with walker next 1-2 sessions and ther ex next 1-2 sessions.       The patient's AM-PAC Basic Mobility Inpatient Short Form Raw Score is 17. A Raw score of greater than 16 suggests the patient may benefit from discharge to home. Please also refer to the recommendation of the Physical Therapist for safe discharge planning.       Goals: Pt will: Perform bed mobility tasks with modified Independent to reposition in bed and prepare for transfers. Pt will perform transfers with modified Independent to decrease burden of care, decrease risk for falls, and improve activity tolerance and prepare for ambulation. Pt will ambulate with LRAD for >/= 250' with  modified Independent  to decrease burden of care, decrease risk for falls, improve activity tolerance, and improve gait quality and to access home environment. Pt will complete 1 step with LRAD and >/= 4 steps with unilateral handrail with supervision to decrease burden of care, return to home with FABRICIO, decrease risk for falls, and improve activity tolerance. Pt will participate in objective balance assessment to determine baseline fall risk. Pt will participate in SSWS assessment to determine level of mobility. Pt will increase B LE strength >/= 1/2 MMT grade to facilitate functional mobility.      Dianna Lawler, PT,DPT

## 2024-06-12 NOTE — PROGRESS NOTES
"[facility]  Progress Note  Name: Bailey Olsen I  MRN: [id number]  Unit/Bed#: -01 I Date of Admission: 6/11/2024   Date of Service: 6/12/2024 I Hospital Day: 1    Assessment & Plan   * Hepatic encephalopathy (HCC)  Assessment & Plan  Presents to ED with increasing weakness and shakes with slight confusion for the last 3 to 4 days  Has history of known hepatic cirrhosis, states she was off lactulose for a week  Reviewed recent discharge diagnosis -was instructed to continue taking lactulose    CT head revealing \" no acute intercranial abnormality\"  Ammonia 159  LFTs  -AST mildly elevated, bilirubin mildly elevated  INR 1.7  Pancytopenia stable    Lactulose 30 mg given x 1 - continue 4 times daily 20 mg, titrate for 3-4 bowel movements a day  Aspiration precautions  Intake and output with BM counts to assist with lactulose titration  Avoid any sedatives or hepatotoxic medications if at all possible  No ascites noted on CT abdomen pelvis but noted to have large stool burden.  Patient also is on chronic narcotic regimen which probably makes her more constipated than usual.  Continue Xifaxan as per home regimen      Chronic diastolic (congestive) heart failure (HCC)  Assessment & Plan  Wt Readings from Last 3 Encounters:   06/11/24 118 kg (260 lb)   06/02/24 119 kg (263 lb 0.1 oz)     Recently discharged with weight 263 pounds  Echo 5/28/2024 -EF 65 to 69% with normal systolic function, normal wall motion and normal diastolic function, no significant valvular abnormality  On torsemide 20 mg twice daily, spironolactone 50 mg daily  Continue diuretics  Not in acute exacerbation    Fall  Assessment & Plan  Patient experienced a fall in the ED  Still in the bathroom hitting head off sink  Repeat head CT no acute abnormality    PT/OT consult    Sarcoidosis  Assessment & Plan  History of sarcoidosis.cont dapsone    Pancytopenia (HCC)  Assessment & Plan  Chronic secondary to cirrhosis   Stable " at baseline     Class 3 severe obesity due to excess calories in adult (HCC)  Assessment & Plan  Recommend diet and lifestyle management    Other cirrhosis of liver (HCC)  Assessment & Plan  Chronically maintained on spironolactone 50 mg daily, torsemide 20 mg twice daily patient is vaccinated for 50 mg twice daily and lactulose 20 g as needed daily    Presenting with hepatic encephalopathy  CT abdomen pelvis -cirrhotic liver with portal hypertension and splenomegaly, no acute findings  With distended abdomen secondary to constipation.  No ascites noted         VTE Pharmacologic Prophylaxis: VTE Score: 4 Moderate Risk (Score 3-4) - Pharmacological DVT Prophylaxis Contraindicated. Sequential Compression Devices Ordered.    Mobility:   Basic Mobility Inpatient Raw Score: 18  JH-HLM Goal: 6: Walk 10 steps or more  JH-HLM Achieved: 5: Stand (1 or more minutes)  JH-HLM Goal achieved. Continue to encourage appropriate mobility.    Patient Centered Rounds: I performed bedside rounds with nursing staff today.   Discussions with Specialists or Other Care Team Provider: none    Education and Discussions with Family / Patient: will update spouse    Total Time Spent on Date of Encounter in care of patient: 35 mins. This time was spent on one or more of the following: performing physical exam; counseling and coordination of care; obtaining or reviewing history; documenting in the medical record; reviewing/ordering tests, medications or procedures; communicating with other healthcare professionals and discussing with patient's family/caregivers.    Current Length of Stay: 1 day(s)  Current Patient Status: Inpatient   Certification Statement: The patient will continue to require additional inpatient hospital stay due to hepatic encephalopathy  Discharge Plan: Anticipate discharge in 24-48 hrs to home.    Code Status: Level 1 - Full Code    Subjective:   Patient appears to be a little less confused compared to yesterday but so far  is only had 1 or 2 medium sized bowel movements needs to have more in order to help clear the encephalopathy.  She was not taking her lactulose for almost a week at home    Objective:     Vitals:   Temp (24hrs), Av.7 °F (36.5 °C), Min:97.5 °F (36.4 °C), Max:98.1 °F (36.7 °C)    Temp:  [97.5 °F (36.4 °C)-98.1 °F (36.7 °C)] 97.5 °F (36.4 °C)  HR:  [63-88] 88  Resp:  [18-22] 22  BP: (108-185)/(65-88) 144/88  SpO2:  [90 %-98 %] 90 %  Body mass index is 43.27 kg/m².     Input and Output Summary (last 24 hours):     Intake/Output Summary (Last 24 hours) at 2024 1014  Last data filed at 2024 0719  Gross per 24 hour   Intake 600 ml   Output 1600 ml   Net -1000 ml       Physical Exam:   Physical Exam  Vitals and nursing note reviewed.   Constitutional:       Appearance: She is ill-appearing.   HENT:      Head: Normocephalic and atraumatic.      Right Ear: External ear normal.      Left Ear: External ear normal.      Nose: Nose normal.      Mouth/Throat:      Pharynx: Oropharynx is clear.   Eyes:      Pupils: Pupils are equal, round, and reactive to light.   Cardiovascular:      Rate and Rhythm: Normal rate and regular rhythm.      Heart sounds: Normal heart sounds.   Pulmonary:      Effort: Pulmonary effort is normal.      Breath sounds: Normal breath sounds.   Abdominal:      General: Bowel sounds are normal.      Palpations: Abdomen is soft.      Tenderness: There is no abdominal tenderness.   Musculoskeletal:         General: Normal range of motion.      Cervical back: Normal range of motion and neck supple.   Skin:     General: Skin is warm and dry.      Capillary Refill: Capillary refill takes less than 2 seconds.   Neurological:      General: No focal deficit present.      Mental Status: She is alert and oriented to person, place, and time.   Psychiatric:         Mood and Affect: Mood normal.            Additional Data:     Labs:  Results from last 7 days   Lab Units 24  0514 24  1158   WBC  Thousand/uL 2.90* 2.58*   HEMOGLOBIN g/dL 11.9 12.3   HEMATOCRIT % 36.2 37.7   PLATELETS Thousands/uL 87* 108*   SEGS PCT %  --  58   LYMPHO PCT %  --  30   MONO PCT %  --  7   EOS PCT %  --  3     Results from last 7 days   Lab Units 06/12/24  0514   SODIUM mmol/L 137   POTASSIUM mmol/L 3.9   CHLORIDE mmol/L 98   CO2 mmol/L 33*   BUN mg/dL 17   CREATININE mg/dL 1.27   ANION GAP mmol/L 6   CALCIUM mg/dL 8.8   ALBUMIN g/dL 3.3*   TOTAL BILIRUBIN mg/dL 1.21*   ALK PHOS U/L 214*   ALT U/L 45   AST U/L 83*   GLUCOSE RANDOM mg/dL 125     Results from last 7 days   Lab Units 06/11/24  1158   INR  1.27*             Results from last 7 days   Lab Units 06/11/24  1158   LACTIC ACID mmol/L 1.8       Lines/Drains:  Invasive Devices       Peripheral Intravenous Line  Duration             Peripheral IV 06/11/24 Left Antecubital <1 day                          Imaging: Reviewed radiology reports from this admission including: abdominal/pelvic CT, CT head, and ultrasound(s)    Recent Cultures (last 7 days):   Results from last 7 days   Lab Units 06/11/24  1158   BLOOD CULTURE  Received in Microbiology Lab. Culture in Progress.  Received in Microbiology Lab. Culture in Progress.       Last 24 Hours Medication List:   Current Facility-Administered Medications   Medication Dose Route Frequency Provider Last Rate    acetaminophen  650 mg Oral Q6H PRN Laura Ariza PA-C      amitriptyline  75 mg Oral HS Laura Ariza PA-C      dapsone  50 mg Oral Daily Laura Ariza PA-C      folic acid  1 mg Oral Daily Laura Ariza PA-C      gabapentin  100 mg Oral BID Laura Ariza PA-C      hydrocortisone   Topical BID Laura Ariza PA-C      hydroxychloroquine  200 mg Oral BID With Meals Laura Ariza PA-C      lactulose  20 g Oral 4x Daily Idalia Stuart MD      ondansetron  4 mg Intravenous Q6H PRN Laura Ariza PA-C      oxyCODONE  10 mg Oral Q6H PRN Laura Ariza PA-C      polyethylene glycol  17 g Oral Daily Idalia Stuart MD       rifaximin  550 mg Oral BID Laura Ariza PA-C      sertraline  100 mg Oral Daily Laura Ariza PA-C      spironolactone  50 mg Oral Daily Laura Ariza PA-C      torsemide  20 mg Oral BID AC Laura Ariza PA-C          Today, Patient Was Seen By: Idalia Stuart MD    **Please Note: This note may have been constructed using a voice recognition system.**

## 2024-06-12 NOTE — PLAN OF CARE
Problem: PAIN - ADULT  Goal: Verbalizes/displays adequate comfort level or baseline comfort level  Description: Interventions:  - Encourage patient to monitor pain and request assistance  - Assess pain using appropriate pain scale  - Administer analgesics based on type and severity of pain and evaluate response  - Implement non-pharmacological measures as appropriate and evaluate response  - Consider cultural and social influences on pain and pain management  - Notify physician/advanced practitioner if interventions unsuccessful or patient reports new pain  Outcome: Progressing     Problem: INFECTION - ADULT  Goal: Absence or prevention of progression during hospitalization  Description: INTERVENTIONS:  - Assess and monitor for signs and symptoms of infection  - Monitor lab/diagnostic results  - Monitor all insertion sites, i.e. indwelling lines, tubes, and drains  - Monitor endotracheal if appropriate and nasal secretions for changes in amount and color  - [place] appropriate cooling/warming therapies per order  - Administer medications as ordered  - Instruct and encourage patient and family to use good hand hygiene technique  - Identify and instruct in appropriate isolation precautions for identified infection/condition  Outcome: Progressing  Goal: Absence of fever/infection during neutropenic period  Description: INTERVENTIONS:  - Monitor WBC    Outcome: Progressing     Problem: SAFETY ADULT  Goal: Patient will remain free of falls  Description: INTERVENTIONS:  - Educate patient/family on patient safety including physical limitations  - Instruct patient to call for assistance with activity   - Consult OT/PT to assist with strengthening/mobility   - Keep Call bell within reach  - Keep bed low and locked with side rails adjusted as appropriate  - Keep care items and personal belongings within reach  - Initiate and maintain comfort rounds  - Make Fall Risk Sign visible to staff  - Apply yellow socks and bracelet  for high fall risk patients  - Consider moving patient to room near nurses station  Outcome: Progressing  Goal: Maintain or return to baseline ADL function  Description: INTERVENTIONS:  -  Assess patient's ability to carry out ADLs; assess patient's baseline for ADL function and identify physical deficits which impact ability to perform ADLs (bathing, care of mouth/teeth, toileting, grooming, dressing, etc.)  - Assess/evaluate cause of self-care deficits   - Assess range of motion  - Assess patient's mobility; develop plan if impaired  - Assess patient's need for assistive devices and provide as appropriate  - Encourage maximum independence but intervene and supervise when necessary  - Involve family in performance of ADLs  - Assess for home care needs following discharge   - Consider OT consult to assist with ADL evaluation and planning for discharge  - Provide patient education as appropriate  Outcome: Progressing  Goal: Maintains/Returns to pre admission functional level  Description: INTERVENTIONS:  - Perform AM-PAC 6 Click Basic Mobility/ Daily Activity assessment daily.  - Set and communicate daily mobility goal to care team and patient/family/caregiver.   - Collaborate with rehabilitation services on mobility goals if consulted  - Out of bed for toileting  - Record patient progress and toleration of activity level   Outcome: Progressing     Problem: DISCHARGE PLANNING  Goal: Discharge to home or other facility with appropriate resources  Description: INTERVENTIONS:  - Identify barriers to discharge w/patient and caregiver  - Arrange for needed discharge resources and transportation as appropriate  - Identify discharge learning needs (meds, wound care, etc.)  - Arrange for interpretive services to assist at discharge as needed  - Refer to Case Management Department for coordinating discharge planning if the patient needs post-hospital services based on physician/advanced practitioner order or complex needs  related to functional status, cognitive ability, or social support system  Outcome: Progressing     Problem: Knowledge Deficit  Goal: Patient/family/caregiver demonstrates understanding of disease process, treatment plan, medications, and discharge instructions  Description: Complete learning assessment and assess knowledge base.  Interventions:  - Provide teaching at level of understanding  - Provide teaching via preferred learning methods  Outcome: Progressing

## 2024-06-12 NOTE — OCCUPATIONAL THERAPY NOTE
Occupational Therapy Evaluation and Treatment     Patient Name: Bailey Olsen  Today's Date: 6/12/2024  Problem List  Principal Problem:    Hepatic encephalopathy (HCC)  Active Problems:    Other cirrhosis of liver (HCC)    Class 3 severe obesity due to excess calories in adult (HCC)    Pancytopenia (HCC)    Sarcoidosis    Chronic diastolic (congestive) heart failure (HCC)    Fall    Past Medical History  Past Medical History:   Diagnosis Date    Carcinoid tumor     neuroendocrine    CHF (congestive heart failure) (HCC)     Cirrhosis of liver (HCC)      Past Surgical History  History reviewed. No pertinent surgical history.        06/12/24 1036   OT Last Visit   OT Visit Date 06/12/24   Note Type   Note type Evaluation and Treatment   Additional Comments Pt greeted in supine, agreeable to OT evaluation   Pain Assessment   Pain Assessment Tool FLACC   Pain Location/Orientation Location: Back;Location: Abdomen;Location: Head   Pain Rating: FLACC (Rest) - Face 1   Pain Rating: FLACC (Rest) - Legs 0   Pain Rating: FLACC (Rest) - Activity 0   Pain Rating: FLACC (Rest) - Cry 0   Pain Rating: FLACC (Rest) - Consolability 0   Score: FLACC (Rest) 1   Pain Rating: FLACC (Activity) - Face 1   Pain Rating: FLACC (Activity) - Legs 1   Pain Rating: FLACC (Activity) - Activity 1   Pain Rating: FLACC (Activity) - Cry 0   Pain Rating: FLACC (Activity) - Consolability 0   Score: FLACC (Activity) 3   Restrictions/Precautions   Weight Bearing Precautions Per Order No   Other Precautions Chair Alarm;Bed Alarm;Fall Risk;Pain   Home Living   Type of Home House   Home Layout Two level  (1 FABIRCIO)   Bathroom Shower/Tub Tub/shower unit   Bathroom Toilet Standard   Bathroom Equipment Grab bars in shower;Grab bars around toilet   Bathroom Accessibility Accessible   Home Equipment Walker   Additional Comments Pt lives on main level of the house with bed/bath. Daughter lives upstairs   Prior Function   Level of [place] Independent with  "ADLs;Independent with functional mobility;Needs assistance with IADLS   Lives With Spouse;Daughter   Receives Help From Family  (spouse works and daughter is home for the summer but a job.)   IADLs Family/Friend/Other provides transportation;Independent with meal prep;Independent with medication management   Falls in the last 6 months 1 to 4  (1 fall)   Vocational On disability   Comments use of walker as needed   Lifestyle   Autonomy Independent with all ADLs/IADLs, use of walker as needed at baseline   Reciprocal Relationships Spouse and daughter   Service to Others On disability   Subjective   Subjective \"I want to lay down\"   ADL   Where Assessed Edge of bed   Eating Assistance 5  Supervision/Setup   Grooming Assistance 5  Supervision/Setup   UB Bathing Assistance 5  Supervision/Setup   LB Bathing Assistance 4  Minimal Assistance   UB Dressing Assistance 5  Supervision/Setup   LB Dressing Assistance 2  Maximal Assistance   LB Dressing Deficit Supervision/safety;Increased time to complete;Don/doff R sock;Don/doff L sock  (Pt requiring maxA to don/doff socks EOB. Pt reports she has assistance from family for socks and shoes as needed.)   Toileting Assistance  4  Minimal Assistance   Toileting Deficit Setup;Verbal cueing;Supervison/safety   Bed Mobility   Supine to Sit   (SBA to sit upright but minAx1 to scoot anteriorly to EOB)   Additional items Assist x 1;Increased time required;Verbal cues   Sit to Supine Unable to assess   Additional Comments HOB flat, verbal cues for rolling and proper technique. Pt completed minAx1 to scoot EOB   Transfers   Sit to Stand 3  Moderate assistance   Additional items Assist x 1;Increased time required;Verbal cues   Stand to Sit 4  Minimal assistance   Additional items Assist x 1;Increased time required;Verbal cues   Stand pivot 4  Minimal assistance   Additional items Assist x 1;Increased time required;Verbal cues   Toilet transfer 4  Minimal assistance   Additional items Assist " x 1;Increased time required;Verbal cues;Standard toilet;Other  (use of grab bars and RW for stability during transfer)   Additional Comments use of RW. Pt requiring verbal cues to slow pace. Pt completed transfers with minAx1 and use of RW for stability   Functional Mobility   Functional Mobility 3  Moderate assistance  (modA--> progressing to Edyta with RW)   Additional Comments functional mobility in room distances with RW and modAx1 to minAx1   Additional items Rolling walker   Balance   Static Sitting Good   Dynamic Sitting Fair +   Static Standing Fair   Dynamic Standing Poor +   Ambulatory Poor +   Activity Tolerance   Activity Tolerance Patient limited by fatigue;Patient limited by pain;Treatment limited secondary to medical complications (Comment)  (fear of falling)   Medical Staff Made Aware PT Dianna, Pt seen for co-evaluation with skilled Physical Therapy due to clinically unstable presentation, medical complexity, fall risk, functional balance, limited activity tolerance which is a decline from PLOF and may impact overall safety.   Nurse Made Aware RN yes   RUE Assessment   RUE Assessment X  (3+/5, when asked to raised arms above head could only raise ~90 degrees but when asked to bring hands behind head able to with no problem. pt with increased exhaustion during session)   LUE Assessment   LUE Assessment X  (3+/5, when asked to raised arms above head could only raise ~90 degrees but when asked to bring hands behind head able to with no problem. pt with increased exhaustion during session)   Hand Function   Gross Motor Coordination Functional   Fine Motor Coordination Functional   Cognition   Overall Cognitive Status WFL   Arousal/Participation Alert;Cooperative   Attention Within functional limits   Orientation Level Oriented X4   Memory Within functional limits   Following Commands Follows one step commands without difficulty   Comments increased time to process and carryover commands. pt reporting  since her fall she feels she has having difficulty processing and thinking   Assessment   Limitation Decreased ADL status;Decreased UE ROM;Decreased UE strength;Decreased endurance;Decreased self-care trans;Decreased high-level ADLs   Prognosis Good   Assessment Pt is a 53 y.o. female seen for OT evaluation s/p adm to [facility] on 6/11/2024 w/ Hepatic encephalopathy (HCC) . Comorbidities affecting pt’s functional performance include a significant PMH of carcinoid tumor, CHF, cirrhosis of liver, pancytopenia. Pt with active OT orders and activity orders for Up and OOB as tolerated. Pt lives in a 2SH with 1 FABRICIO with spouse and daughter. Pt lives on main level of house with tub/shower and standard toilet. At baseline, pt was independent with all ADLs/IADLs except driving. Pt completed supine to sit with HOB flat with minAx1. Pt completed sit to stand with minAx1 and use of RW. Pt completed functional mobility from EOB to bathroom with modAx1 and use of RW. See additional treatment session focusing on ADLs, transfers, patient education, and functional mobility. Upon evaluation, pt currently requires supervision for UB ADLs, maxA for LB ADLs, Edyta for toileting, Edyta for bed mobility, modA-Edyta for functional mobility, and Edyta for transfers 2* the following deficits impacting occupational performance: weakness, decreased UE ROM, decreased strength , decreased balance, decreased activity tolerance, limited functional reach, increased pain, and lethargy . These impairments, as well at pt’s personal factors of: FABRICIO home environment, steps within home environment, difficulty performing ADLs, difficulty performing IADLs, difficulty performing transfers/mobility, and fall risk  limit pt’s ability to safely engage in all baseline areas of occupation. Based on the aforementioned OT evaluation, functional performance deficits, and assessments, pt has been identified as a high complexity evaluation. Pt to continue to  benefit from continued acute OT services during hospital stay to address defined deficits and to maximize level of functional independence in the following Occupational Performance areas: eating, grooming, bathing/shower, toilet hygiene, dressing, health maintenance, functional mobility, community mobility, clothing management, cleaning, and household maintenance. From OT standpoint, recommend maximum resource intensity upon D/C. OT will continue to follow pt 2-3x/wk.   Goals   Patient Goals to lay down   STG Time Frame 3-5   Short Term Goal #1 Pt will improve activity tolerance to G for min 30 min treatment sessions for increase engagement in functional tasks   Short Term Goal #2 Pt will complete bed mobility at a mod I level w/ G balance/safety demonstrated to decrease caregiver assistance required   Short Term Goal  Pt will complete LB dressing/self care w/ mod I using adaptive device and DME as needed   LTG Time Frame 10-14   Long Term Goal #1 Pt will complete toileting w/ mod I w/ G hygiene/thoroughness using DME as needed   Long Term Goal #2 Pt will improve functional transfers to mod I on/off all surfaces using DME as needed w/ G balance/safety   Long Term Goal Pt will improve functional mobility during ADL/IADL/leisure tasks to mod I using DME as needed w/ G balance/safety   Plan   Treatment Interventions ADL retraining;Functional transfer training;Endurance training;Patient/family training;UE strengthening/ROM;Compensatory technique education;Continued evaluation;Energy conservation;Activityengagement   Goal Expiration Date 06/26/24   OT Treatment Day 0   OT Frequency 2-3x/wk   Discharge Recommendation   Rehab Resource Intensity Level, OT I (Maximum Resource Intensity)   Additional Comments  The patient's raw score on the -PAC Daily Activity Inpatient Short Form is 17 . A raw score of less than 19 suggests the patient may benefit from discharge to post-acute rehabilitation services. Please refer to the  recommendation of the Occupational Therapist for safe discharge planning.   AM-PAC Daily Activity Inpatient   Lower Body Dressing 2   Bathing 2   Toileting 3   Upper Body Dressing 3   Grooming 3   Eating 4   Daily Activity Raw Score 17   Daily Activity Standardized Score (Calc for Raw Score >=11) 37.26   AM-PAC Applied Cognition Inpatient   Following a Speech/Presentation 3   Understanding Ordinary Conversation 3   Taking Medications 3   Remembering Where Things Are Placed or Put Away 3   Remembering List of 4-5 Errands 3   Taking Care of Complicated Tasks 3   Applied Cognition Raw Score 18   Applied Cognition Standardized Score 38.07   Additional Treatment Session   Start Time 1100   End Time 1113   Treatment Assessment Pt seen for OT treatment session focusing on ADLs/IADLs, functional mobility, functional standing tolerance, functional transfers, patient education, continued evaluation. Pt alert and cooperative throughout session. Pt with good sitting balance and poor + dynamic standing balance. Pt limited by pain and fatigue during session today. Pt completed stand to sit onto standard toilet with minAx1 and use of grab bars and RW for stability. Pt completed toileting and perineal hygiene with supervision. Pt completed UB/LB bathing seated on toilet with supervision and use of washcloth and basin. Pt completed don of clean gown with supervision and Edyta to tie in back. Pt completed functional mobility to EOB with modA and use of RW. Verbal cues required throughout session to focus on task at hand. Pt attempting to grab purse while standing with RW, guided pt to sit first then reach for purse due to instability on feet. Pt reporting she does everything fast and needs to slow down. Pt reports pain in head, back, and abdomen and no dizziness. Pt's vitals include: stable, BP at start of session EOB: 120/69, BP after bathing and toileting at EOB: 152/90.     Pt ended session seated in recliner with alarm activated.  Call bell and phone within reach. All needs met and pt reports no further questions at this time. Continue to recommend maximum resource intensity when medically cleared. OT will continue to follow pt on caseload.   Additional Treatment Day 1   End of Consult   Education Provided Yes   Patient Position at End of Consult Bedside chair;Bed/Chair alarm activated;All needs within reach   Nurse Communication Nurse aware of consult   End of Consult Comments Pt seated OOB in chair with chair alarm activated at end of session. Call bell and phone within reach. All needs met and pt reports no further questions for OT at this time.   Brianna Reed, OT

## 2024-06-12 NOTE — ASSESSMENT & PLAN NOTE
Patient experienced a fall in the ED  Still in the bathroom hitting head off sink  Repeat head CT no acute abnormality    PT/OT consult

## 2024-06-12 NOTE — SEPSIS NOTE
Sepsis Note   Bailey Olsen 53 y.o. female MRN: [id number]  Unit/Bed#: -01 Encounter: [id number]       Initial Sepsis Screening       Row Name 06/12/24 0814 06/12/24 0725             Is the patient's history suggestive of a new or worsening infection? No (P)   -SG No  -SG       Suspected source of infection -- --                 User Key  (r) = Recorded By, (t) = Taken By, (c) = Cosigned By      Initials Name Provider Type    RITA Rodriguez PA-C Physician Assistant                        Body mass index is 43.27 kg/m².  Wt Readings from Last 1 Encounters:   06/11/24 118 kg (260 lb)     IBW (Ideal Body Weight): 57 kg    Ideal body weight: 57 kg (125 lb 10.6 oz)  Adjusted ideal body weight: 81.4 kg (179 lb 6.4 oz)    Meeting criteria with leukopenia and tachypnea, no evidence of active infection at this time.

## 2024-06-13 LAB
AMMONIA PLAS-SCNC: 119 UMOL/L (ref 18–72)
ANION GAP SERPL CALCULATED.3IONS-SCNC: 4 MMOL/L (ref 4–13)
BUN SERPL-MCNC: 15 MG/DL (ref 5–25)
CALCIUM SERPL-MCNC: 8.9 MG/DL (ref 8.4–10.2)
CHLORIDE SERPL-SCNC: 98 MMOL/L (ref 96–108)
CO2 SERPL-SCNC: 34 MMOL/L (ref 21–32)
CREAT SERPL-MCNC: 1.14 MG/DL (ref 0.6–1.3)
GFR SERPL CREATININE-BSD FRML MDRD: 54 ML/MIN/1.73SQ M
GLUCOSE SERPL-MCNC: 121 MG/DL (ref 65–140)
POTASSIUM SERPL-SCNC: 4.3 MMOL/L (ref 3.5–5.3)
SODIUM SERPL-SCNC: 136 MMOL/L (ref 135–147)

## 2024-06-13 PROCEDURE — 97530 THERAPEUTIC ACTIVITIES: CPT

## 2024-06-13 PROCEDURE — 99232 SBSQ HOSP IP/OBS MODERATE 35: CPT

## 2024-06-13 PROCEDURE — 97116 GAIT TRAINING THERAPY: CPT

## 2024-06-13 PROCEDURE — 80048 BASIC METABOLIC PNL TOTAL CA: CPT | Performed by: FAMILY MEDICINE

## 2024-06-13 PROCEDURE — 82140 ASSAY OF AMMONIA: CPT | Performed by: FAMILY MEDICINE

## 2024-06-13 PROCEDURE — 97535 SELF CARE MNGMENT TRAINING: CPT

## 2024-06-13 RX ORDER — LACTULOSE 10 G/15ML
30 SOLUTION ORAL 4 TIMES DAILY
Status: DISCONTINUED | OUTPATIENT
Start: 2024-06-13 | End: 2024-06-15

## 2024-06-13 RX ADMIN — GABAPENTIN 100 MG: 100 CAPSULE ORAL at 16:42

## 2024-06-13 RX ADMIN — HYDROXYCHLOROQUINE SULFATE 200 MG: 200 TABLET, FILM COATED ORAL at 16:43

## 2024-06-13 RX ADMIN — POLYETHYLENE GLYCOL 3350 17 G: 17 POWDER, FOR SOLUTION ORAL at 07:38

## 2024-06-13 RX ADMIN — HYDROCORTISONE: 1 CREAM TOPICAL at 07:41

## 2024-06-13 RX ADMIN — GABAPENTIN 100 MG: 100 CAPSULE ORAL at 07:40

## 2024-06-13 RX ADMIN — RIFAXIMIN 550 MG: 550 TABLET ORAL at 16:43

## 2024-06-13 RX ADMIN — FOLIC ACID 1 MG: 1 TABLET ORAL at 07:41

## 2024-06-13 RX ADMIN — OXYCODONE HYDROCHLORIDE 10 MG: 10 TABLET ORAL at 07:41

## 2024-06-13 RX ADMIN — TORSEMIDE 20 MG: 20 TABLET ORAL at 07:41

## 2024-06-13 RX ADMIN — RIFAXIMIN 550 MG: 550 TABLET ORAL at 07:39

## 2024-06-13 RX ADMIN — LACTULOSE 20 G: 20 SOLUTION ORAL at 07:41

## 2024-06-13 RX ADMIN — SERTRALINE HYDROCHLORIDE 100 MG: 100 TABLET ORAL at 07:39

## 2024-06-13 RX ADMIN — LACTULOSE 30 G: 20 SOLUTION ORAL at 16:44

## 2024-06-13 RX ADMIN — ACETAMINOPHEN 650 MG: 325 TABLET ORAL at 21:12

## 2024-06-13 RX ADMIN — OXYCODONE HYDROCHLORIDE 10 MG: 10 TABLET ORAL at 16:43

## 2024-06-13 RX ADMIN — LACTULOSE 30 G: 20 SOLUTION ORAL at 21:12

## 2024-06-13 RX ADMIN — SPIRONOLACTONE 50 MG: 25 TABLET ORAL at 07:40

## 2024-06-13 RX ADMIN — HYDROXYCHLOROQUINE SULFATE 200 MG: 200 TABLET, FILM COATED ORAL at 07:39

## 2024-06-13 RX ADMIN — TORSEMIDE 20 MG: 20 TABLET ORAL at 16:42

## 2024-06-13 RX ADMIN — AMITRIPTYLINE HYDROCHLORIDE 75 MG: 25 TABLET, FILM COATED ORAL at 21:13

## 2024-06-13 RX ADMIN — DAPSONE 50 MG: 25 TABLET ORAL at 07:43

## 2024-06-13 RX ADMIN — LACTULOSE 30 G: 20 SOLUTION ORAL at 11:39

## 2024-06-13 NOTE — OCCUPATIONAL THERAPY NOTE
Occupational Therapy Progress Note     Patient Name: Bailey Olsen  Today's Date: 6/13/2024  Problem List  Principal Problem:    Hepatic encephalopathy (HCC)  Active Problems:    Other cirrhosis of liver (HCC)    Class 3 severe obesity due to excess calories in adult (HCC)    Pancytopenia (HCC)    Sarcoidosis    Chronic diastolic (congestive) heart failure (HCC)    Fall       06/13/24 1444   Note Type   Note Type Treatment   Pain Assessment   Pain Assessment Tool FLACC   Pain Location/Orientation Orientation: Left;Location: Head;Other (Comment)  (Toes)   Pain Rating: FLACC (Rest) - Face 0   Pain Rating: FLACC (Rest) - Legs 0   Pain Rating: FLACC (Rest) - Activity 0   Pain Rating: FLACC (Rest) - Cry 0   Pain Rating: FLACC (Rest) - Consolability 0   Score: FLACC (Rest) 0   Pain Rating: FLACC (Activity) - Face 1   Pain Rating: FLACC (Activity) - Legs 0   Pain Rating: FLACC (Activity) - Activity 0   Pain Rating: FLACC (Activity) - Cry 1   Pain Rating: FLACC (Activity) - Consolability 0   Score: FLACC (Activity) 2   Restrictions/Precautions   Other Precautions Chair Alarm;Bed Alarm;Fall Risk;Pain   ADL   Grooming Assistance   (SBA)   Grooming Deficit Standing with assistive device;Supervision/safety;Increased time to complete;Wash/dry hands   Grooming Comments Pt washing/drying B hands while standing at sink with steadying assistance.   LB Dressing Assistance 3  Moderate Assistance   LB Dressing Deficit Thread RLE into underwear;Thread LLE into underwear;Pull up over hips   LB Dressing Comments Pt requiring moderate assistance to don briefs, threading through BLEs in seated and pulling up over hips in standing.   Toileting Assistance  3  Moderate Assistance   Toileting Deficit Perineal hygiene;Clothing management up;Clothing management down   Toileting Comments Pt attempting to participate in pericare posteriorly in standing however requiring assistance for thoroughness.   Bed Mobility   Supine to Sit 4  Minimal  assistance   Additional items Assist x 1;Increased time required;Verbal cues;Other;Comment  (Trunk management)   Additional Comments Pt received supine in bed upon start of session. Pt supine > sit EOB with min assist x 1 for trunk management.   Transfers   Sit to Stand 4  Minimal assistance   Additional items Assist x 1;Increased time required;Verbal cues   Stand to Sit 4  Minimal assistance   Additional items Assist x 1;Increased time required;Verbal cues   Stand pivot 4  Minimal assistance   Additional items Assist x 1;Increased time required;Verbal cues   Additional Comments All functional transfers with RW. Pt with increased posterior lean upon standing. Pt requiring VCs and manual cues for sequencing and for safe RW management throughout.   Functional Mobility   Functional Mobility   (Minimal assistance/steadying assistance)   Additional Comments Pt participated in short functional distances x several trials, varying between minimal assistance and steadying assistance at best.\   Toilet Transfers   Toilet Transfer From Bed   Toilet Transfer Type To and from   Toilet Transfer to Standard toilet   Toilet Transfer Technique Ambulating   Toilet Transfers   (Minimal assistance/steadying assistance)   Toilet Transfers Comments Pt participated in functional transfer to/from standard toilet with minimal assistance/steadying assistance with use of RW, VCs for safe transfer techniques.   Cognition   Overall Cognitive Status Impaired   Arousal/Participation Alert;Cooperative   Attention Attends with cues to redirect   Memory Decreased short term memory   Following Commands Follows one step commands with increased time or repetition   Comments Increased time to process and carry out commands. Intermittently confused throughout session, requiring VCs for sequencing of all tasks. Pt admitting that she does not feel she is thinking correctly. Pt offering contradictory biographical information throughout session, reporting to  this OT in bathroom that she is L handed, later reporting to PT that she is R handed. Tends to stare blankly and absentmindedly.   Activity Tolerance   Activity Tolerance Patient limited by fatigue  (Pt limited by cognitive status)   Medical Staff Made Aware PTDianna   Assessment   Assessment Pt completed OT tx session #1 focused on ADLs and functional mobility. Pt alert and agreeable to participate. PT/OT co-treat completed d/t significant mobility deficits and safety concerns. Pt demonstrated improvements in assistance required for functional mobility this session but continues to be limited by cognitive status and tremulous movements of BUEs. Pt currently completing UB ADLs @ supervision/setup, LB ADLs @ moderate assistance, and functional mobility with RW @ steadying assistance at best. Pt demonstrating F participation and F potential to achieve goals but is currently demonstrating deficits in decrease activity tolerance, decrease standing balance, decrease performance during ADL tasks, decrease cognition, decrease performance during functional transfers, decrease GM control, steps to enter home, limited family support, high fall risk, and limited insight to deficits. Continue to recommend Level I (Maximum Resource Intensity) upon discharge to increase safety and independence in ADL tasks and functional mobility.   Plan   Treatment Interventions ADL retraining;Functional transfer training;UE strengthening/ROM;Endurance training;Cognitive reorientation;Patient/family training;Equipment evaluation/education;Compensatory technique education;Continued evaluation;Energy conservation;Activityengagement   Goal Expiration Date 06/26/24   OT Treatment Day 1   OT Frequency 2-3x/wk   Discharge Recommendation   Rehab Resource Intensity Level, OT I (Maximum Resource Intensity)   AM-PAC Daily Activity Inpatient   Lower Body Dressing 2   Bathing 2   Toileting 2   Upper Body Dressing 3   Grooming 3   Eating 4   Daily Activity  Raw Score 16   Daily Activity Standardized Score (Calc for Raw Score >=11) 35.96   AM-PAC Applied Cognition Inpatient   Following a Speech/Presentation 2   Understanding Ordinary Conversation 3   Taking Medications 2   Remembering Where Things Are Placed or Put Away 3   Remembering List of 4-5 Errands 2   Taking Care of Complicated Tasks 2   Applied Cognition Raw Score 14   Applied Cognition Standardized Score 32.02   End of Consult   Patient Position at End of Consult Bedside chair;Bed/Chair alarm activated;All needs within reach     The patient's raw score on the AM-PAC Daily Activity Inpatient Short Form is 16. A raw score of less than 19 suggests the patient may benefit from discharge to post-acute rehabilitation services. Please refer to the recommendation of the Occupational Therapist for safe discharge planning.    Pt goals to be met by 6/26/2024:    Short Term Goal #1 Pt will improve activity tolerance to G for min 30 min treatment sessions for increase engagement in functional tasks   Short Term Goal #2 Pt will complete bed mobility at a mod I level w/ G balance/safety demonstrated to decrease caregiver assistance required   Short Term Goal  Pt will complete LB dressing/self care w/ mod I using adaptive device and DME as needed   LTG Time Frame 10-14   Long Term Goal #1 Pt will complete toileting w/ mod I w/ G hygiene/thoroughness using DME as needed   Long Term Goal #2 Pt will improve functional transfers to mod I on/off all surfaces using DME as needed w/ G balance/safety   Long Term Goal Pt will improve functional mobility during ADL/IADL/leisure tasks to mod I using DME as needed w/ G balance/safety       Ryan Farah MS, OTR/L

## 2024-06-13 NOTE — PROGRESS NOTES
Patient:    MRN:  [id number]    Aidin Request ID:  [id number]    Level of care reserved:  Inpatient Rehab Facility    Partner Reserved:  [facility] [place] [address] [phone]    Clinical needs requested:    Geography searched:  45 miles around [phone]    Start of Service:    Request sent:  2:18pm EDT on 6/12/2024 by Emilie Sigala    Partner reserved:  5:30pm EDT on 6/13/2024 by Maranda Chadwick    Choice list shared:  5:30pm EDT on 6/13/2024 by Maranda Chadwick

## 2024-06-13 NOTE — UTILIZATION REVIEW
Continued Stay Review    Date: 6/13                        Current Patient Class: inpatient  Current Level of Care: med/surg  HPI:53 y.o. female initially admitted on 6/11 with hepatic encephalopathy      Assessment/Plan: Pt states she is feeling tired today. States her only pain is in her head from when she fell. AA&Ox3, although thought she was at another hospital. Increase lactulose to 30 mg QID and titrate for 3-4 bms/day. Tentative plan for IP [facility] pending ins auth. Referrals placed to [facility], CM has placed referrals and will f/u with facilities and ins. Continue po meds, supportive care. SCDs. Cardiac diet, monitor I/Os, daily wts. Labs in AM         Date/Time Temp Pulse Resp BP MAP (mmHg) SpO2 O2 Device Patient Position - Orthostatic VS [place] Coma Scale Score Pain    06/13/24 13:58:38 97.9 °F (36.6 °C) 84 20 125/65 85 92 % -- -- -- --    06/13/24 0900 -- -- -- -- -- -- -- -- 15 --    06/13/24 0745 -- -- -- -- -- -- None (Room air) -- -- --    06/13/24 0741 -- -- -- -- -- -- -- -- -- 8    06/13/24 07:31:57 97.9 °F (36.6 °C) 83 18 125/68 87 93 % -- -- -- --    06/13/24 0100 -- -- -- -- -- -- None (Room air) -- 15 --    06/12/24 21:57:16 97.9 °F (36.6 °C) 89 -- 119/65 83 94 % -- -- -- 7       Weight (last 2 days)       Date/Time Weight    06/11/24 1500 118 (260)    06/11/24 1143 118 (260)            Pertinent Labs/Diagnostic Results:   Radiology:  US abdomen limited   Final Interpretation by Alli Hernandez MD (06/12 7474)      No ascites.        Cardiology:  ECG 12 lead   Final Result by Gaston Estes MD (06/12 7299)   Normal sinus rhythm   Minimal voltage criteria for LVH, may be normal variant ( R in aVL )   Prolonged QT   Abnormal ECG   When compared with ECG of 26-MAY-2024 14:37,   Nonspecific T wave abnormality, improved in Lateral leads   Confirmed by Gaston Estes ([id number]) on 6/12/2024 1:34:30 PM        Results from last 7 days   Lab Units 06/12/24  0514  06/11/24  1158   WBC Thousand/uL 2.90* 2.58*   HEMOGLOBIN g/dL 11.9 12.3   HEMATOCRIT % 36.2 37.7   PLATELETS Thousands/uL 87* 108*   TOTAL NEUT ABS Thousands/µL  --  1.49*     Results from last 7 days   Lab Units 06/13/24  0613 06/12/24  0514 06/11/24  1158   SODIUM mmol/L 136 137 136   POTASSIUM mmol/L 4.3 3.9 4.0   CHLORIDE mmol/L 98 98 98   CO2 mmol/L 34* 33* 32   ANION GAP mmol/L 4 6 6   BUN mg/dL 15 17 23   CREATININE mg/dL 1.14 1.27 1.24   EGFR ml/min/1.73sq m 54 47 49   CALCIUM mg/dL 8.9 8.8 9.5   MAGNESIUM mg/dL  --  1.9 2.1     Results from last 7 days   Lab Units 06/13/24  0613 06/12/24  1045 06/12/24  0514 06/11/24  1158   AST U/L  --   --  83* 86*   ALT U/L  --   --  45 48   ALK PHOS U/L  --   --  214* 250*   TOTAL PROTEIN g/dL  --   --  7.7 8.4   ALBUMIN g/dL  --   --  3.3* 3.6   TOTAL BILIRUBIN mg/dL  --   --  1.21* 1.49*   AMMONIA umol/L 119* 85*  --  159*       Results from last 7 days   Lab Units 06/13/24  0613 06/12/24  0514 06/11/24  1158   GLUCOSE RANDOM mg/dL 121 125 118     Results from last 7 days   Lab Units 06/11/24  1158   INFLUENZA A PCR  Negative   INFLUENZA B PCR  Negative   RSV PCR  Negative     Results from last 7 days   Lab Units 06/11/24  1158   BLOOD CULTURE  No Growth at 24 hrs.  No Growth at 24 hrs.       Medications:   Scheduled Medications:  amitriptyline, 75 mg, Oral, HS  dapsone, 50 mg, Oral, Daily  folic acid, 1 mg, Oral, Daily  gabapentin, 100 mg, Oral, BID  hydrocortisone, , Topical, BID  hydroxychloroquine, 200 mg, Oral, BID With Meals  lactulose, 30 g, Oral, 4x Daily  polyethylene glycol, 17 g, Oral, Daily  rifaximin, 550 mg, Oral, BID  sertraline, 100 mg, Oral, Daily  spironolactone, 50 mg, Oral, Daily  torsemide, 20 mg, Oral, BID AC    PRN Meds:  acetaminophen, 650 mg, Oral, Q6H PRN  ondansetron, 4 mg, Intravenous, Q6H PRN 6/12 x1  oxyCODONE, 10 mg, Oral, Q6H PRN 6/12 x3, 6/13 x1        Discharge Plan: tentative plan for Acute Rehab    Network Utilization Review  Department  ATTENTION: Please call with any questions or concerns to [phone] and carefully listen to the prompts so that you are directed to the right person. All voicemails are confidential.   For Discharge needs, contact Care Management DC Support Team at [phone] opt. 2  Send all requests for admission clinical reviews, approved or denied determinations and any other requests to dedicated fax number below belonging to the campus where the patient is receiving treatment. List of dedicated fax numbers for the Facilities:  FACILITY NAME UR FAX NUMBER   ADMISSION DENIALS (Administrative/Medical Necessity) [phone]   DISCHARGE SUPPORT TEAM (NETWORK) [phone]   PARENT CHILD HEALTH (Maternity/NICU/Pediatrics) [phone]   [facility] [phone]   [facility] [phone]   [facility] [phone]   [facility] [phone]   [facility] [phone]   [facility] [phone]   [facility] [phone]   [facility] [phone]   [facility] [phone]   [facility] [phone]   [facility] [phone]   [facility] [phone]

## 2024-06-13 NOTE — CASE MANAGEMENT
Case Management Discharge Planning Note    Patient name Bailey Olsen  Location /-01 MRN [id number]  : 1970 Date 2024       Current Admission Date: 2024  Current Admission Diagnosis:Hepatic encephalopathy (HCC)   Patient Active Problem List    Diagnosis Date Noted Date Diagnosed    Hepatic encephalopathy (HCC) 2024     Fall 2024     Other cirrhosis of liver (HCC) 2024     History of malignant carcinoid tumor of small intestine 2024     Class 3 severe obesity due to excess calories in adult (HCC) 2024     Primary hypertension 2024     Anxiety 2024     Hypokalemia 2024     Fibromyalgia 2024     Iron deficiency anemia 2024     Pancytopenia (HCC) 2024     Sarcoidosis 2024     Chronic diastolic (congestive) heart failure (HCC) 2024     Bilateral pulmonary infiltrates 2024       LOS (days): 2  Geometric Mean LOS (GMLOS) (days):   Days to GMLOS:     OBJECTIVE:  Risk of Unplanned Readmission Score: 17.53         Current admission status: Inpatient   Preferred Pharmacy:   [facility] [address] [place] [address] [address] [facility]  [address] [facility] [address]  Phone: [phone] Fax: [phone]    Primary Care Provider: Nayely Brown DO    Primary Insurance: [facility]  Secondary Insurance:     DISCHARGE DETAILS:    Discussed dc options with patient and we called and spoke to patients sister: Fidelina , as she was involved in the decision making yesterday.  Reviewed options from [facility] acceptance: [facility], [facility] and [facility]  1st choice is [facility], noting they reviewed the referral willing to considered but did not commit. 2nd choice is [facility] in [place].   Patient requested CM follow up with [facility]. CM called Sophia at  at [facility], aware they are 1st choice and CM would like to initiate the auth number.  Sophia is going to  "review and get back to me with a decision.    Both patient and sister are aware auth will be needed for Acute Rehab and they may want to pay a less of a rehab facility at subacute rehab.  At this time per Bailey,\" Acute Rehab is the only options as she will go home.\"     Early referral for [facility] was also placed for a back up plan.    CM will follow up with Sophia at [facility] for acceptance.                                                        "

## 2024-06-13 NOTE — PLAN OF CARE
Problem: PHYSICAL THERAPY ADULT  Goal: Performs mobility at highest level of function for planned discharge setting.  See evaluation for individualized goals.  Description: Treatment/Interventions: ADL retraining, Functional transfer training, LE strengthening/ROM, Elevations, Therapeutic exercise, Endurance training, Patient/family training, Equipment eval/education, Bed mobility, Gait training, Compensatory technique education, Spoke to nursing, Spoke to case management, OT          See flowsheet documentation for full assessment, interventions and recommendations.  Outcome: Progressing  Note: Prognosis: Good  Problem List: Decreased strength, Decreased endurance, Impaired balance, Decreased mobility, Impaired judgement, Decreased safety awareness, Obesity, Pain  Assessment: Pt tolerated session fairly. She requires increased time to process commands and increased time to carry out commands. She requires verbal cues for safety and technique with mobility. She was able to ambulate increased distance with slight decrease in assistance but requires cues for direction and safe navigation of environment. She is limited by decreased balance, strength, endurance and impaired cognition. Pt reporting difficulty thinking. She will continue to benefit from PT services to maximize LOF.  Barriers to Discharge: Inaccessible home environment, Decreased caregiver support  Barriers to Discharge Comments: pt has support form family, however is home alone during the day. increased fall risk, recent fall, requires asisstance to complete mobility  Rehab Resource Intensity Level, PT: I (Maximum Resource Intensity)    See flowsheet documentation for full assessment.

## 2024-06-13 NOTE — ASSESSMENT & PLAN NOTE
"Presents to ED with increasing weakness and shakes with slight confusion for the last 3 to 4 days  Has history of known hepatic cirrhosis, states she was off lactulose for a week  Reviewed recent discharge diagnosis -was instructed to continue taking lactulose  CT head revealing \" no acute intercranial abnormality\"  Ammonia 159  LFTs  -AST mildly elevated, bilirubin mildly elevated  INR 1.7  Pancytopenia stable  Increase lactulose to 30mg QID titrate for 3-4 bowel movements a day  Aspiration precautions  Intake and output with BM counts to assist with lactulose titration  Avoid any sedatives or hepatotoxic medications if at all possible  No ascites noted on CT abdomen pelvis but noted to have large stool burden.  Patient also is on chronic narcotic regimen which probably makes her more constipated than usual.  Continue Xifaxan as per home regimen  "

## 2024-06-13 NOTE — PLAN OF CARE
Problem: OCCUPATIONAL THERAPY ADULT  Goal: Performs self-care activities at highest level of function for planned discharge setting.  See evaluation for individualized goals.  Description: Treatment Interventions: ADL retraining, Functional transfer training, Endurance training, Patient/family training, UE strengthening/ROM, Compensatory technique education, Continued evaluation, Energy conservation, Activityengagement          See flowsheet documentation for full assessment, interventions and recommendations.   Outcome: Progressing  Note: Limitation: Decreased ADL status, Decreased UE ROM, Decreased UE strength, Decreased endurance, Decreased self-care trans, Decreased high-level ADLs  Prognosis: Good  Assessment: Pt completed OT tx session #1 focused on ADLs and functional mobility. Pt alert and agreeable to participate. PT/OT co-treat completed d/t significant mobility deficits and safety concerns. Pt demonstrated improvements in assistance required for functional mobility this session but continues to be limited by cognitive status and tremulous movements of BUEs. Pt currently completing UB ADLs @ supervision/setup, LB ADLs @ moderate assistance, and functional mobility with RW @ steadying assistance at best. Pt demonstrating F participation and F potential to achieve goals but is currently demonstrating deficits in decrease activity tolerance, decrease standing balance, decrease performance during ADL tasks, decrease cognition, decrease performance during functional transfers, decrease GM control, steps to enter home, limited family support, high fall risk, and limited insight to deficits. Continue to recommend Level I (Maximum Resource Intensity) upon discharge to increase safety and independence in ADL tasks and functional mobility.     Rehab Resource Intensity Level, OT: I (Maximum Resource Intensity)

## 2024-06-13 NOTE — ARC ADMISSION
Reviewed patient's case with ARC physician - patient is preapproved for ARC pending medical stability, functional progress/tolerance, insurance authorization and bed availability. Please note, patient may progress to being too functional for ARC, and if reaches a Supervision level with therapy, will be recommended for different level of care. CM has been updated. Will continue to follow at this time.

## 2024-06-13 NOTE — PLAN OF CARE
Problem: INFECTION - ADULT  Goal: Absence or prevention of progression during hospitalization  Description: INTERVENTIONS:  - Assess and monitor for signs and symptoms of infection  - Monitor lab/diagnostic results  - Monitor all insertion sites, i.e. indwelling lines, tubes, and drains  - Monitor endotracheal if appropriate and nasal secretions for changes in amount and color  - [place] appropriate cooling/warming therapies per order  - Administer medications as ordered  - Instruct and encourage patient and family to use good hand hygiene technique  - Identify and instruct in appropriate isolation precautions for identified infection/condition  Outcome: Progressing  Goal: Absence of fever/infection during neutropenic period  Description: INTERVENTIONS:  - Monitor WBC    Outcome: Progressing

## 2024-06-13 NOTE — PHYSICAL THERAPY NOTE
"   PHYSICAL THERAPY TREATMENT NOTE  NAME:  Bailey Olsen  DATE: 06/13/24    Length Of Stay: 2  Performed at least 2 patient identifiers during session: Name and Birthday    TREATMENT FLOW SHEET:    06/13/24 1443   PT Last Visit   PT Visit Date 06/13/24   Note Type   Note Type Treatment   Pain Assessment   Pain Assessment Tool FLACC   Pain Location/Orientation Orientation: Left  (side head to toes)   Pain Rating: FLACC (Rest) - Face 0   Pain Rating: FLACC (Rest) - Legs 0   Pain Rating: FLACC (Rest) - Activity 0   Pain Rating: FLACC (Rest) - Cry 0   Pain Rating: FLACC (Rest) - Consolability 0   Score: FLACC (Rest) 0   Pain Rating: FLACC (Activity) - Face 1   Pain Rating: FLACC (Activity) - Legs 0   Pain Rating: FLACC (Activity) - Activity 0   Pain Rating: FLACC (Activity) - Cry 1   Pain Rating: FLACC (Activity) - Consolability 0   Score: FLACC (Activity) 2   Restrictions/Precautions   Other Precautions Chair Alarm;Bed Alarm;Fall Risk;Pain   General   Chart Reviewed Yes   Response to Previous Treatment Patient with no complaints from previous session.   Cognition   Following Commands Follows one step commands with increased time or repetition   Comments repeated cues for safety, patient requiring increased time to process and carry out commands.   Subjective   Subjective \"You make me do things I don't want to\" (referring to getting OOB-pt agreeable to participate   Bed Mobility   Supine to Sit 4  Minimal assistance   Additional items Assist x 1;Increased time required;Verbal cues  (trunk management)   Additional Comments HOB flat without bedrail.   Transfers   Sit to Stand 4  Minimal assistance   Additional items Assist x 1;Increased time required;Verbal cues   Stand to Sit 4  Minimal assistance   Additional items Assist x 1;Increased time required;Verbal cues   Stand pivot 4  Minimal assistance   Additional items Assist x 1;Increased time required;Verbal cues   Additional Comments use of RW. verbal and manual cues " for hand placement for safety with RW. sit<>stand with minAx1 to steadying assistance at best with inc time and effort to achieve standing and manual cues for ant wt shifting. pt with posteiror lean upon standing. spt with RW with minAx1 with min manual cues for ant wt shifting and cues for turning compeltely prior to sitting. pt with inc time to respond and carry out commands. spt wiht minimal to steadying assistance at best as session progressed. dynamic standing balance with 1 UE support with steadying assistance with wide DENICE.   Ambulation/Elevation   Gait pattern Wide DENICE;Improper Weight shift;Decreased foot clearance;Short stride;Excessively slow   Gait Assistance 4  Minimal assist   Additional items Assist x 1;Verbal cues   Assistive Device Rolling walker   Distance ambulated 14'x1 with RW with minAx1 with slight posteiro lean with verbal cues for inc step length and foot clearance. ambulated 90'x1 with RW with minimal to steadying asisstance at best with min manual cues for RW management for safe navigation of environment.   Balance   Static Sitting Good   Dynamic Sitting Fair +   Static Standing Fair -   Dynamic Standing Poor +   Ambulatory Poor +   Activity Tolerance   Activity Tolerance Patient limited by fatigue;Patient limited by pain   Medical Staff Made Aware Pat DAVALOS   Nurse Made Aware Meera WRAY   Exercises   Knee AROM Long Arc Quad Sitting;10 reps;AROM;Bilateral   Ankle Pumps Sitting;10 reps;AROM;Bilateral   Assessment   Prognosis Good   Problem List Decreased strength;Decreased endurance;Impaired balance;Decreased mobility;Impaired judgement;Decreased safety awareness;Obesity;Pain   Barriers to Discharge Inaccessible home environment;Decreased caregiver support   Barriers to Discharge Comments pt has support form family, however is home alone during the day. increased fall risk, recent fall, requires asisstance to complete mobility   Goals   PT Treatment Day 2   Plan   Treatment/Interventions ADL  retraining;Functional transfer training;LE strengthening/ROM;Elevations;Therapeutic exercise;Endurance training;Patient/family training;Equipment eval/education;Bed mobility;Gait training;Compensatory technique education;Spoke to nursing;Spoke to case management;OT   Progress Slow progress, decreased activity tolerance   PT Frequency 3-5x/wk   Discharge Recommendation   Rehab Resource Intensity Level, PT I (Maximum Resource Intensity)   AM-PAC Basic Mobility Inpatient   Turning in Flat Bed Without Bedrails 3   Lying on Back to Sitting on Edge of Flat Bed Without Bedrails 3   Moving Bed to Chair 3   Standing Up From Chair Using Arms 3   Walk in Room 3   Climb 3-5 Stairs With Railing 2   Basic Mobility Inpatient Raw Score 17   Basic Mobility Standardized Score 39.67   [facility] Highest Level Of Mobility   -HL Goal 5: Stand one or more mins   -[facility] Achieved 7: Walk 25 feet or more   Education   Education Provided Mobility training;Assistive device   End of Consult   Patient Position at End of Consult Bedside chair;Bed/Chair alarm activated;All needs within reach       The patient's AM-PAC Basic Mobility Inpatient Short Form Raw Score is 17. A Raw score of greater than 16 suggests the patient may benefit from discharge to home. Please also refer to the recommendation of the Physical Therapist for safe discharge planning.     Pt tolerated session fairly. She requires increased time to process commands and increased time to carry out commands. She requires verbal cues for safety and technique with mobility. She was able to ambulate increased distance with slight decrease in assistance but requires cues for direction and safe navigation of environment. She is limited by decreased balance, strength, endurance and impaired cognition. Pt reporting difficulty thinking. She will continue to benefit from PT services to maximize LOF.     Dianna Lawler, PT,DPT

## 2024-06-13 NOTE — PROGRESS NOTES
"[facility]  Progress Note  Name: Bailey Olsen I  MRN: [id number]  Unit/Bed#: -01 I Date of Admission: 6/11/2024   Date of Service: 6/13/2024 I Hospital Day: 2    Assessment & Plan   * Hepatic encephalopathy (HCC)  Assessment & Plan  Presents to ED with increasing weakness and shakes with slight confusion for the last 3 to 4 days  Has history of known hepatic cirrhosis, states she was off lactulose for a week  Reviewed recent discharge diagnosis -was instructed to continue taking lactulose  CT head revealing \" no acute intercranial abnormality\"  Ammonia 159  LFTs  -AST mildly elevated, bilirubin mildly elevated  INR 1.7  Pancytopenia stable  Increase lactulose to 30mg QID titrate for 3-4 bowel movements a day  Aspiration precautions  Intake and output with BM counts to assist with lactulose titration  Avoid any sedatives or hepatotoxic medications if at all possible  No ascites noted on CT abdomen pelvis but noted to have large stool burden.  Patient also is on chronic narcotic regimen which probably makes her more constipated than usual.  Continue Xifaxan as per home regimen    Fall  Assessment & Plan  Patient experienced a fall in the ED  Still in the bathroom hitting head off sink  Repeat head CT no acute abnormality  PT/OT consult    Chronic diastolic (congestive) heart failure (HCC)  Assessment & Plan  Wt Readings from Last 3 Encounters:   06/11/24 118 kg (260 lb)   06/02/24 119 kg (263 lb 0.1 oz)     Recently discharged with weight 263 pounds  Echo 5/28/2024 -EF 65 to 69% with normal systolic function, normal wall motion and normal diastolic function, no significant valvular abnormality  On torsemide 20 mg twice daily, spironolactone 50 mg daily  Continue diuretics  Not in acute exacerbation    Sarcoidosis  Assessment & Plan  History of sarcoidosis.cont dapsone    Pancytopenia (HCC)  Assessment & Plan  Chronic secondary to cirrhosis   Stable at baseline     Class 3 severe " obesity due to excess calories in adult (HCC)  Assessment & Plan  Recommend diet and lifestyle management    Other cirrhosis of liver (HCC)  Assessment & Plan  Chronically maintained on spironolactone 50 mg daily, torsemide 20 mg twice daily patient is vaccinated for 50 mg twice daily and lactulose 20 g as needed daily  Presenting with hepatic encephalopathy  CT abdomen pelvis -cirrhotic liver with portal hypertension and splenomegaly, no acute findings  With distended abdomen secondary to constipation.  No ascites noted             VTE Pharmacologic Prophylaxis: VTE Score: 4 Moderate Risk (Score 3-4) - Pharmacological DVT Prophylaxis Ordered: heparin.    Mobility:   Basic Mobility Inpatient Raw Score: 17  JH-HLM Goal: 5: Stand one or more mins  JH-HLM Achieved: 5: Stand (1 or more minutes)  JH-HLM Goal achieved. Continue to encourage appropriate mobility.    Patient Centered Rounds: I performed bedside rounds with nursing staff today.   Discussions with Specialists or Other Care Team Provider: nursing, cm    Education and Discussions with Family / Patient: Updated  () via phone.    Total Time Spent on Date of Encounter in care of patient:  mins. This time was spent on one or more of the following: performing physical exam; counseling and coordination of care; obtaining or reviewing history; documenting in the medical record; reviewing/ordering tests, medications or procedures; communicating with other healthcare professionals and discussing with patient's family/caregivers.    Current Length of Stay: 2 day(s)  Current Patient Status: Inpatient   Certification Statement: The patient will continue to require additional inpatient hospital stay due to requiring titration of lactulose for improvement in ammonia  Discharge Plan: Anticipate discharge in 24-48 hrs to rehab facility.    Code Status: Level 1 - Full Code    Subjective:   Patient states that she is feeling tired today. Denies chest pain,  shortness of breath or abdominal pain. States that her only pain is in her head from when she fell. Does not offer any further complaints and is agreeable to current plan.    Objective:     Vitals:   Temp (24hrs), Av.9 °F (36.6 °C), Min:97.9 °F (36.6 °C), Max:97.9 °F (36.6 °C)    Temp:  [97.9 °F (36.6 °C)] 97.9 °F (36.6 °C)  HR:  [83-93] 83  Resp:  [18] 18  BP: (119-152)/(65-90) 125/68  SpO2:  [93 %-96 %] 93 %  Body mass index is 43.27 kg/m².     Input and Output Summary (last 24 hours):     Intake/Output Summary (Last 24 hours) at 2024 1037  Last data filed at 2024 1001  Gross per 24 hour   Intake 465 ml   Output 3050 ml   Net -2585 ml       Physical Exam:   Physical Exam  Vitals reviewed.   Constitutional:       General: She is not in acute distress.     Appearance: Normal appearance. She is obese. She is not ill-appearing.   HENT:      Head: Normocephalic and atraumatic.      Nose: Nose normal.      Mouth/Throat:      Mouth: Mucous membranes are moist.      Pharynx: Oropharynx is clear.   Eyes:      Extraocular Movements: Extraocular movements intact.      Conjunctiva/sclera: Conjunctivae normal.   Cardiovascular:      Rate and Rhythm: Normal rate and regular rhythm.      Pulses: Normal pulses.      Heart sounds: Normal heart sounds. No murmur heard.  Pulmonary:      Effort: Pulmonary effort is normal. No respiratory distress.      Breath sounds: Normal breath sounds. No wheezing or rhonchi.   Abdominal:      General: Abdomen is flat. Bowel sounds are normal. There is no distension.      Palpations: Abdomen is soft.      Tenderness: There is no abdominal tenderness. There is no guarding.   Musculoskeletal:         General: Normal range of motion.      Cervical back: Normal range of motion.      Right lower leg: No edema.      Left lower leg: No edema.   Skin:     General: Skin is warm.      Findings: Bruising present.      Comments: Bruise above left eye   Neurological:      General: No focal  deficit present.      Mental Status: She is alert. Mental status is at baseline.      Motor: No weakness.      Comments: Oriented to self, month and that she is in the hospital (although she thought this was [facility])   Psychiatric:         Mood and Affect: Mood normal.         Behavior: Behavior normal.         Thought Content: Thought content normal.         Judgment: Judgment normal.          Additional Data:     Labs:  Results from last 7 days   Lab Units 06/12/24  0514 06/11/24  1158   WBC Thousand/uL 2.90* 2.58*   HEMOGLOBIN g/dL 11.9 12.3   HEMATOCRIT % 36.2 37.7   PLATELETS Thousands/uL 87* 108*   SEGS PCT %  --  58   LYMPHO PCT %  --  30   MONO PCT %  --  7   EOS PCT %  --  3     Results from last 7 days   Lab Units 06/13/24  0613 06/12/24  0514   SODIUM mmol/L 136 137   POTASSIUM mmol/L 4.3 3.9   CHLORIDE mmol/L 98 98   CO2 mmol/L 34* 33*   BUN mg/dL 15 17   CREATININE mg/dL 1.14 1.27   ANION GAP mmol/L 4 6   CALCIUM mg/dL 8.9 8.8   ALBUMIN g/dL  --  3.3*   TOTAL BILIRUBIN mg/dL  --  1.21*   ALK PHOS U/L  --  214*   ALT U/L  --  45   AST U/L  --  83*   GLUCOSE RANDOM mg/dL 121 125     Results from last 7 days   Lab Units 06/11/24  1158   INR  1.27*             Results from last 7 days   Lab Units 06/11/24  1158   LACTIC ACID mmol/L 1.8       Lines/Drains:  Invasive Devices       Peripheral Intravenous Line  Duration             Peripheral IV 06/11/24 Left Antecubital 1 day                          Imaging: Reviewed radiology reports from this admission including: ultrasound(s)    Recent Cultures (last 7 days):   Results from last 7 days   Lab Units 06/11/24  1158   BLOOD CULTURE  No Growth at 24 hrs.  No Growth at 24 hrs.       Last 24 Hours Medication List:   Current Facility-Administered Medications   Medication Dose Route Frequency Provider Last Rate    acetaminophen  650 mg Oral Q6H PRN Laura Ariza PA-C      amitriptyline  75 mg Oral HS Laura Ariza PA-C      dapsone  50 mg Oral Daily  Laura Ariza PA-C      folic acid  1 mg Oral Daily Laura Ariza PA-C      gabapentin  100 mg Oral BID Laura Ariza PA-C      hydrocortisone   Topical BID Laura Ariza PA-C      hydroxychloroquine  200 mg Oral BID With Meals Laura Ariza PA-C      lactulose  30 g Oral 4x Daily Silva Giron PA-C      ondansetron  4 mg Intravenous Q6H PRN Laura Ariza PA-C      oxyCODONE  10 mg Oral Q6H PRN Laura Ariza PA-C      polyethylene glycol  17 g Oral Daily Idalia Stuart MD      rifaximin  550 mg Oral BID Laura Ariza PA-C      sertraline  100 mg Oral Daily Laura Ariza PA-C      spironolactone  50 mg Oral Daily Laura Ariza PA-C      torsemide  20 mg Oral BID AC Laura Ariza PA-C          Today, Patient Was Seen By: Silva Giron PA-C    **Please Note: This note may have been constructed using a voice recognition system.**     [Negative] : Heme/Lymph

## 2024-06-13 NOTE — CASE MANAGEMENT
Case Management Discharge Planning Note    Patient name Bailey Olsen  Location /-01 MRN [id number]  : 1970 Date 2024       Current Admission Date: 2024  Current Admission Diagnosis:Hepatic encephalopathy (HCC)   Patient Active Problem List    Diagnosis Date Noted Date Diagnosed    Hepatic encephalopathy (HCC) 2024     Fall 2024     Other cirrhosis of liver (HCC) 2024     History of malignant carcinoid tumor of small intestine 2024     Class 3 severe obesity due to excess calories in adult (HCC) 2024     Primary hypertension 2024     Anxiety 2024     Hypokalemia 2024     Fibromyalgia 2024     Iron deficiency anemia 2024     Pancytopenia (HCC) 2024     Sarcoidosis 2024     Chronic diastolic (congestive) heart failure (HCC) 2024     Bilateral pulmonary infiltrates 2024       LOS (days): 2  Geometric Mean LOS (GMLOS) (days):   Days to GMLOS:     OBJECTIVE:  Risk of Unplanned Readmission Score: 17.43         Current admission status: Inpatient   Preferred Pharmacy:   [facility] [address] [place] [address] [address] [facility]  [address] [facility] [address]  Phone: [phone] Fax: [phone]    Primary Care Provider: Nayely Brown DO    Primary Insurance: [facility]  Secondary Insurance:     DISCHARGE DETAILS:    [facility] has accepted, pending stability and authorization.  CM requested CM support to initiate the auth 1st thing on .  CM will follow up with patient tomorrow.

## 2024-06-13 NOTE — PLAN OF CARE
Problem: INFECTION - ADULT  Goal: Absence or prevention of progression during hospitalization  Description: INTERVENTIONS:  - Assess and monitor for signs and symptoms of infection  - Monitor lab/diagnostic results  - Monitor all insertion sites, i.e. indwelling lines, tubes, and drains  - Monitor endotracheal if appropriate and nasal secretions for changes in amount and color  - [place] appropriate cooling/warming therapies per order  - Administer medications as ordered  - Instruct and encourage patient and family to use good hand hygiene technique  - Identify and instruct in appropriate isolation precautions for identified infection/condition  Outcome: Progressing

## 2024-06-14 LAB
ALBUMIN SERPL BCP-MCNC: 3.5 G/DL (ref 3.5–5)
ALP SERPL-CCNC: 240 U/L (ref 34–104)
ALT SERPL W P-5'-P-CCNC: 45 U/L (ref 7–52)
AMMONIA PLAS-SCNC: 57 UMOL/L (ref 18–72)
ANION GAP SERPL CALCULATED.3IONS-SCNC: 7 MMOL/L (ref 4–13)
AST SERPL W P-5'-P-CCNC: 86 U/L (ref 13–39)
BILIRUB SERPL-MCNC: 1.95 MG/DL (ref 0.2–1)
BUN SERPL-MCNC: 15 MG/DL (ref 5–25)
CALCIUM SERPL-MCNC: 9.1 MG/DL (ref 8.4–10.2)
CHLORIDE SERPL-SCNC: 95 MMOL/L (ref 96–108)
CO2 SERPL-SCNC: 31 MMOL/L (ref 21–32)
CREAT SERPL-MCNC: 1.16 MG/DL (ref 0.6–1.3)
ERYTHROCYTE [DISTWIDTH] IN BLOOD BY AUTOMATED COUNT: 13.3 % (ref 11.6–15.1)
GFR SERPL CREATININE-BSD FRML MDRD: 53 ML/MIN/1.73SQ M
GLUCOSE SERPL-MCNC: 133 MG/DL (ref 65–140)
HCT VFR BLD AUTO: 40 % (ref 34.8–46.1)
HGB BLD-MCNC: 12.9 G/DL (ref 11.5–15.4)
MCH RBC QN AUTO: 32.1 PG (ref 26.8–34.3)
MCHC RBC AUTO-ENTMCNC: 32.3 G/DL (ref 31.4–37.4)
MCV RBC AUTO: 100 FL (ref 82–98)
PLATELET # BLD AUTO: 82 THOUSANDS/UL (ref 149–390)
PMV BLD AUTO: 10.3 FL (ref 8.9–12.7)
POTASSIUM SERPL-SCNC: 3.9 MMOL/L (ref 3.5–5.3)
PROT SERPL-MCNC: 8.3 G/DL (ref 6.4–8.4)
RBC # BLD AUTO: 4.02 MILLION/UL (ref 3.81–5.12)
SODIUM SERPL-SCNC: 133 MMOL/L (ref 135–147)
WBC # BLD AUTO: 4.69 THOUSAND/UL (ref 4.31–10.16)

## 2024-06-14 PROCEDURE — 80053 COMPREHEN METABOLIC PANEL: CPT

## 2024-06-14 PROCEDURE — 99232 SBSQ HOSP IP/OBS MODERATE 35: CPT

## 2024-06-14 PROCEDURE — 82140 ASSAY OF AMMONIA: CPT

## 2024-06-14 PROCEDURE — 85027 COMPLETE CBC AUTOMATED: CPT

## 2024-06-14 RX ADMIN — HYDROXYCHLOROQUINE SULFATE 200 MG: 200 TABLET, FILM COATED ORAL at 17:04

## 2024-06-14 RX ADMIN — SPIRONOLACTONE 50 MG: 25 TABLET ORAL at 08:43

## 2024-06-14 RX ADMIN — TORSEMIDE 20 MG: 20 TABLET ORAL at 17:04

## 2024-06-14 RX ADMIN — GABAPENTIN 100 MG: 100 CAPSULE ORAL at 08:44

## 2024-06-14 RX ADMIN — LACTULOSE 30 G: 20 SOLUTION ORAL at 13:45

## 2024-06-14 RX ADMIN — HYDROCORTISONE: 1 CREAM TOPICAL at 08:49

## 2024-06-14 RX ADMIN — ONDANSETRON 4 MG: 2 INJECTION INTRAMUSCULAR; INTRAVENOUS at 14:22

## 2024-06-14 RX ADMIN — LACTULOSE 30 G: 20 SOLUTION ORAL at 21:25

## 2024-06-14 RX ADMIN — OXYCODONE HYDROCHLORIDE 10 MG: 10 TABLET ORAL at 03:53

## 2024-06-14 RX ADMIN — FOLIC ACID 1 MG: 1 TABLET ORAL at 08:43

## 2024-06-14 RX ADMIN — SERTRALINE HYDROCHLORIDE 100 MG: 100 TABLET ORAL at 08:43

## 2024-06-14 RX ADMIN — OXYCODONE HYDROCHLORIDE 10 MG: 10 TABLET ORAL at 14:20

## 2024-06-14 RX ADMIN — OXYCODONE HYDROCHLORIDE 10 MG: 10 TABLET ORAL at 21:25

## 2024-06-14 RX ADMIN — LACTULOSE 30 G: 20 SOLUTION ORAL at 17:04

## 2024-06-14 RX ADMIN — HYDROCORTISONE: 1 CREAM TOPICAL at 17:06

## 2024-06-14 RX ADMIN — DAPSONE 50 MG: 25 TABLET ORAL at 08:48

## 2024-06-14 RX ADMIN — GABAPENTIN 100 MG: 100 CAPSULE ORAL at 17:04

## 2024-06-14 RX ADMIN — LACTULOSE 30 G: 20 SOLUTION ORAL at 08:44

## 2024-06-14 RX ADMIN — TORSEMIDE 20 MG: 20 TABLET ORAL at 06:10

## 2024-06-14 RX ADMIN — RIFAXIMIN 550 MG: 550 TABLET ORAL at 08:43

## 2024-06-14 RX ADMIN — HYDROXYCHLOROQUINE SULFATE 200 MG: 200 TABLET, FILM COATED ORAL at 08:43

## 2024-06-14 RX ADMIN — AMITRIPTYLINE HYDROCHLORIDE 75 MG: 25 TABLET, FILM COATED ORAL at 21:25

## 2024-06-14 RX ADMIN — RIFAXIMIN 550 MG: 550 TABLET ORAL at 17:04

## 2024-06-14 RX ADMIN — ONDANSETRON 4 MG: 2 INJECTION INTRAMUSCULAR; INTRAVENOUS at 21:58

## 2024-06-14 NOTE — CASE MANAGEMENT
Case Management Discharge Planning Note    Patient name Bailey Olsen  Location /-01 MRN [id number]  : 1970 Date 2024       Current Admission Date: 2024  Current Admission Diagnosis:Hepatic encephalopathy (HCC)   Patient Active Problem List    Diagnosis Date Noted Date Diagnosed    Hepatic encephalopathy (HCC) 2024     Fall 2024     Other cirrhosis of liver (HCC) 2024     History of malignant carcinoid tumor of small intestine 2024     Class 3 severe obesity due to excess calories in adult (HCC) 2024     Primary hypertension 2024     Anxiety 2024     Hypokalemia 2024     Fibromyalgia 2024     Iron deficiency anemia 2024     Pancytopenia (HCC) 2024     Sarcoidosis 2024     Chronic diastolic (congestive) heart failure (HCC) 2024     Bilateral pulmonary infiltrates 2024       LOS (days): 3  Geometric Mean LOS (GMLOS) (days):   Days to GMLOS:     OBJECTIVE:  Risk of Unplanned Readmission Score: 15.64         Current admission status: Inpatient   Preferred Pharmacy:   [facility] [address] [place] [address] [address] [facility]  [address] [facility] [address]  Phone: [phone] Fax: [phone]    Primary Care Provider: Nayely Brown DO    Primary Insurance: [facility]  Secondary Insurance:     DISCHARGE DETAILS:  Additional Comments: CM met with pt at bedside and informed her that she was accepted to [facility] and at this time we are just waiting for insurance auth to come back. Pt reports understanding.

## 2024-06-14 NOTE — UTILIZATION REVIEW
Continued Stay Review    Date: 6/14/24                             Current Patient Class: IP  Current Level of Care: MS    HPI:53 y.o. female initially admitted on 6/11/24 - DX:  Hepatic encephalopathy / Fall / Chronic diastolic (congestive) heart failure     Assessment/Plan:   6/14/24: Patient states that she is still sore. Patient still altered and not fully oriented.  Thought she was in [facility] and that it was 2014 . Na 133; T bili 1.95; Alk phos 240; AST 86  Plan: cont lactulose po; pain control (see below); monitor labs    Vital Signs (last 3 days)       Date/Time Temp Pulse Resp BP MAP (mmHg) SpO2 O2 Device Patient Position - Orthostatic VS [place] Coma Scale Score Pain    06/14/24 06:12:55 -- 87 -- 124/70 88 89 % -- -- -- --    06/14/24 0353 -- -- -- -- -- -- -- -- -- 5 06/13/24 22:01:49 97.9 °F (36.6 °C) -- -- 120/70 87 -- -- -- -- --    06/13/24 2112 -- -- -- -- -- -- -- -- -- 6 06/13/24 1924 -- -- -- -- -- -- None (Room air) -- 15 8    06/13/24 1643 -- -- -- -- -- -- -- -- -- 8 06/13/24 13:58:38 97.9 °F (36.6 °C) 84 20 125/65 85 92 % -- -- -- --    06/13/24 0900 -- -- -- -- -- -- -- -- 15 --    06/13/24 0745 -- -- -- -- -- -- None (Room air) -- -- --    06/13/24 0741 -- -- -- -- -- -- -- -- -- 8 06/13/24 07:31:57 97.9 °F (36.6 °C) 83 18 125/68 87 93 % -- -- -- --    06/13/24 0100 -- -- -- -- -- -- None (Room air) -- 15 --    06/12/24 21:57:16 97.9 °F (36.6 °C) 89 -- 119/65 83 94 % -- -- -- 7    06/12/24 14:06:54 97.9 °F (36.6 °C) 87 18 128/81 97 94 % -- -- -- --    06/12/24 1334 -- -- -- -- -- -- -- -- -- 6 06/12/24 10:58:04 -- 93 -- 152/90 111 94 % -- -- -- --    06/12/24 10:37:40 -- 89 -- 120/69 86 96 % -- -- -- --    06/12/24 0915 -- -- -- -- -- 94 % None (Room air) -- 15 --    06/12/24 07:25:22 97.5 °F (36.4 °C) -- 22 144/88 107 -- -- -- -- --    06/12/24 0511 -- -- -- -- -- -- -- -- -- 6 06/11/24 23:12:21 97.7 °F (36.5 °C) 88 -- 108/65 79 90 % -- -- -- --    06/11/24 2300 --  -- -- -- -- 90 % None (Room air) -- 15 --    06/11/24 2238 -- -- -- -- -- -- -- -- -- 8 06/11/24 2010 -- -- -- -- -- -- -- -- -- 7    06/11/24 1628 -- -- -- -- -- -- -- -- -- 8    06/11/24 1521 -- -- -- -- -- -- -- -- 15 --    06/11/24 1500 -- -- 20 -- -- -- -- -- -- --    06/11/24 14:57:15 97.5 °F (36.4 °C) 81 20 145/75 98 92 % -- -- -- --    06/11/24 1345 -- 78 -- -- -- 98 % -- -- -- --    06/11/24 1342 -- -- -- -- -- -- -- -- -- 8    06/11/24 1330 -- 63 18 185/86 124 96 % None (Room air) Lying -- 9    06/11/24 1205 -- -- -- -- -- -- -- -- 15 --    06/11/24 1143 98.1 °F (36.7 °C) 86 18 147/70 101 96 % None (Room air) Lying -- --          Weight (last 2 days)       None              Pertinent Labs/Diagnostic Results:   Radiology:  US abdomen limited   Final Interpretation by Alli Hernandez MD (06/12 4343)      No ascites.         Workstation performed: [id number]         CT head without contrast   Final Interpretation by Dirk Andrade MD (06/11 1420)      No acute intracranial abnormality.                  Workstation performed: [id number]         CT head without contrast   Final Interpretation by Bright Jones MD (06/11 1255)      No acute intracranial abnormality.                  Workstation performed: [id number]         CT abdomen pelvis with contrast   Final Interpretation by Bright Jones MD (06/11 1315)      No acute findings in the abdomen or pelvis.      Cirrhotic liver with portal hypertension and splenomegaly.         Workstation performed: [id number]           Cardiology:  ECG 12 lead   Final Result by Gaston Estes MD (06/12 7034)   Normal sinus rhythm   Minimal voltage criteria for LVH, may be normal variant ( R in aVL )   Prolonged QT   Abnormal ECG   When compared with ECG of 26-MAY-2024 14:37,   Nonspecific T wave abnormality, improved in Lateral leads   Confirmed by Gaston Estes ([id number]) on 6/12/2024 1:34:30 PM          Results from last 7 days   Lab Units  06/11/24  1158   SARS-COV-2  Negative     Results from last 7 days   Lab Units 06/14/24  0401 06/12/24  0514 06/11/24  1158   WBC Thousand/uL 4.69 2.90* 2.58*   HEMOGLOBIN g/dL 12.9 11.9 12.3   HEMATOCRIT % 40.0 36.2 37.7   PLATELETS Thousands/uL 82* 87* 108*   TOTAL NEUT ABS Thousands/µL  --   --  1.49*        Results from last 7 days   Lab Units 06/14/24  0401 06/13/24 0613 06/12/24 0514 06/11/24  1158   SODIUM mmol/L 133* 136 137 136   POTASSIUM mmol/L 3.9 4.3 3.9 4.0   CHLORIDE mmol/L 95* 98 98 98   CO2 mmol/L 31 34* 33* 32   ANION GAP mmol/L 7 4 6 6   BUN mg/dL 15 15 17 23   CREATININE mg/dL 1.16 1.14 1.27 1.24   EGFR ml/min/1.73sq m 53 54 47 49   CALCIUM mg/dL 9.1 8.9 8.8 9.5   MAGNESIUM mg/dL  --   --  1.9 2.1     Results from last 7 days   Lab Units 06/14/24  0401 06/13/24 0613 06/12/24  1045 06/12/24 0514 06/11/24  1158   AST U/L 86*  --   --  83* 86*   ALT U/L 45  --   --  45 48   ALK PHOS U/L 240*  --   --  214* 250*   TOTAL PROTEIN g/dL 8.3  --   --  7.7 8.4   ALBUMIN g/dL 3.5  --   --  3.3* 3.6   TOTAL BILIRUBIN mg/dL 1.95*  --   --  1.21* 1.49*   AMMONIA umol/L 57 119* 85*  --  159*        Results from last 7 days   Lab Units 06/14/24 0401 06/13/24 0613 06/12/24  0514 06/11/24  1158   GLUCOSE RANDOM mg/dL 133 121 125 118        Results from last 7 days   Lab Units 06/11/24  1158   HS TNI 0HR ng/L 5        Results from last 7 days   Lab Units 06/11/24  1158   PROTIME seconds 16.2*   INR  1.27*   PTT seconds 27        Results from last 7 days   Lab Units 06/11/24  1158   LACTIC ACID mmol/L 1.8        Results from last 7 days   Lab Units 06/11/24  1158   BNP pg/mL 9        Results from last 7 days   Lab Units 06/11/24  1158   LIPASE u/L 21        Results from last 7 days   Lab Units 06/11/24  1158   INFLUENZA A PCR  Negative   INFLUENZA B PCR  Negative   RSV PCR  Negative        Results from last 7 days   Lab Units 06/11/24  1158   BLOOD CULTURE  No Growth at 48 hrs.  No Growth at 48 hrs.           Medications:   Scheduled Medications:  amitriptyline, 75 mg, Oral, HS  dapsone, 50 mg, Oral, Daily  folic acid, 1 mg, Oral, Daily  gabapentin, 100 mg, Oral, BID  hydrocortisone, , Topical, BID  hydroxychloroquine, 200 mg, Oral, BID With Meals  lactulose, 30 g, Oral, 4x Daily  polyethylene glycol, 17 g, Oral, Daily  rifaximin, 550 mg, Oral, BID  sertraline, 100 mg, Oral, Daily  spironolactone, 50 mg, Oral, Daily  torsemide, 20 mg, Oral, BID AC      Continuous IV Infusions: None       PRN Meds:  acetaminophen, 650 mg, Oral, Q6H PRN  ondansetron, 4 mg, Intravenous, Q6H PRN  oxyCODONE, 10 mg, Oral, Q6H PRN  (6/14 recd x1 so far today)        Discharge Plan: TBD - to rehab facility [facility] NPI: [id number]     Network Utilization Review Department  ATTENTION: Please call with any questions or concerns to [phone] and carefully listen to the prompts so that you are directed to the right person. All voicemails are confidential.   For Discharge needs, contact Care Management DC Support Team at [phone] opt. [phone]  Send all requests for admission clinical reviews, approved or denied determinations and any other requests to dedicated fax number below belonging to the campus where the patient is receiving treatment. List of dedicated fax numbers for the Facilities:  FACILITY NAME UR FAX NUMBER   ADMISSION DENIALS (Administrative/Medical Necessity) [phone]   DISCHARGE SUPPORT TEAM (NETWORK) [phone]   PARENT CHILD HEALTH (Maternity/NICU/Pediatrics) [phone]   [facility] [phone]   [facility] [phone]   [facility] [phone]   [facility] [phone]   [facility] [phone]   [facility] [phone]   [facility] [phone]   [facility]  [phone]   [facility] [phone]   [facility] [phone]   [facility] [phone]   [facility] [phone]

## 2024-06-14 NOTE — PLAN OF CARE
Problem: SAFETY ADULT  Goal: Patient will remain free of falls  Description: INTERVENTIONS:  - Educate patient/family on patient safety including physical limitations  - Instruct patient to call for assistance with activity   - Consult OT/PT to assist with strengthening/mobility   - Keep Call bell within reach  - Keep bed low and locked with side rails adjusted as appropriate  - Keep care items and personal belongings within reach  - Initiate and maintain comfort rounds  - Make Fall Risk Sign visible to staff  - Offer Toileting every 2 Hours, in advance of need  - Initiate/Maintain bed/chair alarm  - Obtain necessary fall risk management equipment: bed/chair alarm, walker  - Apply yellow socks and bracelet for high fall risk patients  - Consider moving patient to room near nurses station  Outcome: Progressing  Goal: Maintain or return to baseline ADL function  Description: INTERVENTIONS:  -  Assess patient's ability to carry out ADLs; assess patient's baseline for ADL function and identify physical deficits which impact ability to perform ADLs (bathing, care of mouth/teeth, toileting, grooming, dressing, etc.)  - Assess/evaluate cause of self-care deficits   - Assess range of motion  - Assess patient's mobility; develop plan if impaired  - Assess patient's need for assistive devices and provide as appropriate  - Encourage maximum independence but intervene and supervise when necessary  - Involve family in performance of ADLs  - Assess for home care needs following discharge   - Consider OT consult to assist with ADL evaluation and planning for discharge  - Provide patient education as appropriate  Outcome: Progressing  Goal: Maintains/Returns to pre admission functional level  Description: INTERVENTIONS:  - Perform AM-PAC 6 Click Basic Mobility/ Daily Activity assessment daily.  - Set and communicate daily mobility goal to care team and patient/family/caregiver.   - Collaborate with rehabilitation services on  mobility goals if consulted  - Perform Range of Motion 3 times a day.  - Reposition patient every 2 hours.  - Ambulate patient 3 times a day  - Out of bed to chair 3 times a day   - Out of bed for meals 3 times a day  - Out of bed for toileting  - Record patient progress and toleration of activity level   Outcome: Progressing     Problem: DISCHARGE PLANNING  Goal: Discharge to home or other facility with appropriate resources  Description: INTERVENTIONS:  - Identify barriers to discharge w/patient and caregiver  - Arrange for needed discharge resources and transportation as appropriate  - Identify discharge learning needs (meds, wound care, etc.)  - Arrange for interpretive services to assist at discharge as needed  - Refer to Case Management Department for coordinating discharge planning if the patient needs post-hospital services based on physician/advanced practitioner order or complex needs related to functional status, cognitive ability, or social support system  Outcome: Progressing

## 2024-06-14 NOTE — PROGRESS NOTES
"[facility]  Progress Note  Name: Bailey Olsen I  MRN: [id number]  Unit/Bed#: -01 I Date of Admission: 6/11/2024   Date of Service: 6/14/2024 I Hospital Day: 3    Assessment & Plan   * Hepatic encephalopathy (HCC)  Assessment & Plan  Presents to ED with increasing weakness and shakes with slight confusion for the last 3 to 4 days  Has history of known hepatic cirrhosis, states she was off lactulose for a week  Reviewed recent discharge diagnosis -was instructed to continue taking lactulose  CT head revealing \" no acute intercranial abnormality\"  Ammonia 159  LFTs  -AST mildly elevated, bilirubin mildly elevated  INR 1.7  Pancytopenia stable  Increase lactulose to 30mg QID titrate for 3-4 bowel movements a day  Aspiration precautions  Intake and output with BM counts to assist with lactulose titration  Avoid any sedatives or hepatotoxic medications if at all possible  No ascites noted on CT abdomen pelvis but noted to have large stool burden.  Patient also is on chronic narcotic regimen which probably makes her more constipated than usual.  Continue Xifaxan as per home regimen    Fall  Assessment & Plan  Patient experienced a fall in the ED  Still in the bathroom hitting head off sink  Repeat head CT no acute abnormality  PT/OT consult    Chronic diastolic (congestive) heart failure (HCC)  Assessment & Plan  Wt Readings from Last 3 Encounters:   06/11/24 118 kg (260 lb)   06/02/24 119 kg (263 lb 0.1 oz)     Recently discharged with weight 263 pounds  Echo 5/28/2024 -EF 65 to 69% with normal systolic function, normal wall motion and normal diastolic function, no significant valvular abnormality  On torsemide 20 mg twice daily, spironolactone 50 mg daily  Continue diuretics  Not in acute exacerbation    Sarcoidosis  Assessment & Plan  History of sarcoidosis.cont dapsone    Pancytopenia (HCC)  Assessment & Plan  Chronic secondary to cirrhosis   Stable at baseline     Class 3 severe " obesity due to excess calories in adult (HCC)  Assessment & Plan  Recommend diet and lifestyle management    Other cirrhosis of liver (HCC)  Assessment & Plan  Chronically maintained on spironolactone 50 mg daily, torsemide 20 mg twice daily patient is vaccinated for 50 mg twice daily and lactulose 20 g as needed daily  Presenting with hepatic encephalopathy  CT abdomen pelvis -cirrhotic liver with portal hypertension and splenomegaly, no acute findings  With distended abdomen secondary to constipation.  No ascites noted             VTE Pharmacologic Prophylaxis: VTE Score: 4 Moderate Risk (Score 3-4) - Pharmacological DVT Prophylaxis Ordered: heparin.    Mobility:   Basic Mobility Inpatient Raw Score: 17  JH-HLM Goal: 5: Stand one or more mins  JH-HLM Achieved: 3: Sit at edge of bed  JH-HLM Goal NOT achieved. Continue with multidisciplinary rounding and encourage appropriate mobility to improve upon JH-HLM goals.    Patient Centered Rounds: I performed bedside rounds with nursing staff today.   Discussions with Specialists or Other Care Team Provider: nursing and cm    Education and Discussions with Family / Patient: Attempted to update  (son) via phone. Left voicemail.     Total Time Spent on Date of Encounter in care of patient:  mins. This time was spent on one or more of the following: performing physical exam; counseling and coordination of care; obtaining or reviewing history; documenting in the medical record; reviewing/ordering tests, medications or procedures; communicating with other healthcare professionals and discussing with patient's family/caregivers.    Current Length of Stay: 3 day(s)  Current Patient Status: Inpatient   Certification Statement: The patient will continue to require additional inpatient hospital stay due to requiring further bowel movements  Discharge Plan: Anticipate discharge in 24-48 hrs to rehab facility.    Code Status: Level 1 - Full Code    Subjective:   Patient  states that she is still sore. Denies chest pain, shortness of breath or abdominal pain. Patient still altered and not fully oriented.     Objective:     Vitals:   Temp (24hrs), Av.9 °F (36.6 °C), Min:97.9 °F (36.6 °C), Max:97.9 °F (36.6 °C)    Temp:  [97.9 °F (36.6 °C)] 97.9 °F (36.6 °C)  HR:  [84-87] 87  Resp:  [20] 20  BP: (120-125)/(65-70) 124/70  SpO2:  [89 %-92 %] 89 %  Body mass index is 43.27 kg/m².     Input and Output Summary (last 24 hours):     Intake/Output Summary (Last 24 hours) at 2024 1203  Last data filed at 2024 0612  Gross per 24 hour   Intake 360 ml   Output --   Net 360 ml       Physical Exam:   Physical Exam  Vitals reviewed.   Constitutional:       General: She is not in acute distress.     Appearance: Normal appearance. She is obese. She is not ill-appearing.   HENT:      Head: Normocephalic and atraumatic.      Nose: Nose normal.      Mouth/Throat:      Mouth: Mucous membranes are moist.      Pharynx: Oropharynx is clear.   Eyes:      Extraocular Movements: Extraocular movements intact.      Conjunctiva/sclera: Conjunctivae normal.   Cardiovascular:      Rate and Rhythm: Normal rate and regular rhythm.      Pulses: Normal pulses.      Heart sounds: Normal heart sounds. No murmur heard.  Pulmonary:      Effort: Pulmonary effort is normal. No respiratory distress.      Breath sounds: Normal breath sounds. No wheezing or rhonchi.   Abdominal:      General: Abdomen is flat. Bowel sounds are normal. There is no distension.      Palpations: Abdomen is soft.      Tenderness: There is no abdominal tenderness. There is no guarding.   Musculoskeletal:         General: Normal range of motion.      Cervical back: Normal range of motion.      Right lower leg: No edema.      Left lower leg: No edema.   Skin:     General: Skin is warm.      Findings: Bruising present.      Comments: Bruising left forehead    Neurological:      General: No focal deficit present.      Mental Status: She is  alert. Mental status is at baseline.      Motor: No weakness.      Comments: Oriented to self and month. Thought she was in [facility] and that it was 2014   Psychiatric:         Mood and Affect: Mood normal.         Behavior: Behavior normal.         Thought Content: Thought content normal.         Judgment: Judgment normal.          Additional Data:     Labs:  Results from last 7 days   Lab Units 06/14/24  0401 06/12/24  0514 06/11/24  1158   WBC Thousand/uL 4.69   < > 2.58*   HEMOGLOBIN g/dL 12.9   < > 12.3   HEMATOCRIT % 40.0   < > 37.7   PLATELETS Thousands/uL 82*   < > 108*   SEGS PCT %  --   --  58   LYMPHO PCT %  --   --  30   MONO PCT %  --   --  7   EOS PCT %  --   --  3    < > = values in this interval not displayed.     Results from last 7 days   Lab Units 06/14/24  0401   SODIUM mmol/L 133*   POTASSIUM mmol/L 3.9   CHLORIDE mmol/L 95*   CO2 mmol/L 31   BUN mg/dL 15   CREATININE mg/dL 1.16   ANION GAP mmol/L 7   CALCIUM mg/dL 9.1   ALBUMIN g/dL 3.5   TOTAL BILIRUBIN mg/dL 1.95*   ALK PHOS U/L 240*   ALT U/L 45   AST U/L 86*   GLUCOSE RANDOM mg/dL 133     Results from last 7 days   Lab Units 06/11/24  1158   INR  1.27*             Results from last 7 days   Lab Units 06/11/24  1158   LACTIC ACID mmol/L 1.8       Lines/Drains:  Invasive Devices       Peripheral Intravenous Line  Duration             Peripheral IV 06/11/24 Left Antecubital 3 days                          Imaging: No pertinent imaging reviewed.    Recent Cultures (last 7 days):   Results from last 7 days   Lab Units 06/11/24  1158   BLOOD CULTURE  No Growth at 48 hrs.  No Growth at 48 hrs.       Last 24 Hours Medication List:   Current Facility-Administered Medications   Medication Dose Route Frequency Provider Last Rate    acetaminophen  650 mg Oral Q6H PRN Laura Ariaz PA-C      amitriptyline  75 mg Oral HS Laura Ariza PA-C      dapsone  50 mg Oral Daily Laura Ariza PA-C      folic acid  1 mg Oral Daily Laura Ariza PA-C       gabapentin  100 mg Oral BID Laura Ariza PA-C      hydrocortisone   Topical BID Laura Ariza PA-C      hydroxychloroquine  200 mg Oral BID With Meals Laura Ariza PA-C      lactulose  30 g Oral 4x Daily Silva Giron PA-C      ondansetron  4 mg Intravenous Q6H PRN Laura Ariza PA-C      oxyCODONE  10 mg Oral Q6H PRN Laura Ariza PA-C      polyethylene glycol  17 g Oral Daily Idalia Stuart MD      rifaximin  550 mg Oral BID Laura Ariza PA-C      sertraline  100 mg Oral Daily Laura Ariza PA-C      spironolactone  50 mg Oral Daily Laura Ariza PA-C      torsemide  20 mg Oral BID AC Laura Ariza PA-C          Today, Patient Was Seen By: Silva Giron PA-C    **Please Note: This note may have been constructed using a voice recognition system.**

## 2024-06-14 NOTE — PLAN OF CARE
Problem: PAIN - ADULT  Goal: Verbalizes/displays adequate comfort level or baseline comfort level  Description: Interventions:  - Encourage patient to monitor pain and request assistance  - Assess pain using appropriate pain scale  - Administer analgesics based on type and severity of pain and evaluate response  - Implement non-pharmacological measures as appropriate and evaluate response  - Consider cultural and social influences on pain and pain management  - Notify physician/advanced practitioner if interventions unsuccessful or patient reports new pain  Outcome: Progressing     Problem: INFECTION - ADULT  Goal: Absence or prevention of progression during hospitalization  Description: INTERVENTIONS:  - Assess and monitor for signs and symptoms of infection  - Monitor lab/diagnostic results  - Monitor all insertion sites, i.e. indwelling lines, tubes, and drains  - Monitor endotracheal if appropriate and nasal secretions for changes in amount and color  - [place] appropriate cooling/warming therapies per order  - Administer medications as ordered  - Instruct and encourage patient and family to use good hand hygiene technique  - Identify and instruct in appropriate isolation precautions for identified infection/condition  Outcome: Progressing  Goal: Absence of fever/infection during neutropenic period  Description: INTERVENTIONS:  - Monitor WBC    Outcome: Progressing     Problem: SAFETY ADULT  Goal: Patient will remain free of falls  Description: INTERVENTIONS:  - Educate patient/family on patient safety including physical limitations  - Instruct patient to call for assistance with activity   - Consult OT/PT to assist with strengthening/mobility   - Keep Call bell within reach  - Keep bed low and locked with side rails adjusted as appropriate  - Keep care items and personal belongings within reach  - Initiate and maintain comfort rounds  - Make Fall Risk Sign visible to staff  - Offer Toileting every 3 Hours,  in advance of need  - Initiate/Maintain bed alarm  - Obtain necessary fall risk management equipment  - Apply yellow socks and bracelet for high fall risk patients  - Consider moving patient to room near nurses station  Outcome: Progressing  Goal: Maintain or return to baseline ADL function  Description: INTERVENTIONS:  -  Assess patient's ability to carry out ADLs; assess patient's baseline for ADL function and identify physical deficits which impact ability to perform ADLs (bathing, care of mouth/teeth, toileting, grooming, dressing, etc.)  - Assess/evaluate cause of self-care deficits   - Assess range of motion  - Assess patient's mobility; develop plan if impaired  - Assess patient's need for assistive devices and provide as appropriate  - Encourage maximum independence but intervene and supervise when necessary  - Involve family in performance of ADLs  - Assess for home care needs following discharge   - Consider OT consult to assist with ADL evaluation and planning for discharge  - Provide patient education as appropriate  Outcome: Progressing  Goal: Maintains/Returns to pre admission functional level  Description: INTERVENTIONS:  - Perform AM-PAC 6 Click Basic Mobility/ Daily Activity assessment daily.  - Set and communicate daily mobility goal to care team and patient/family/caregiver.   - Collaborate with rehabilitation services on mobility goals if consulted  - Perform Range of Motion 3 times a day.  - Reposition patient every 2 hours.  - Dangle patient 3 times a day  - Stand patient 3 times a day  - Ambulate patient 3 times a day  - Out of bed to chair 3 times a day   - Out of bed for meals 3 times a day  - Out of bed for toileting  - Record patient progress and toleration of activity level   Outcome: Progressing     Problem: DISCHARGE PLANNING  Goal: Discharge to home or other facility with appropriate resources  Description: INTERVENTIONS:  - Identify barriers to discharge w/patient and caregiver  -  Arrange for needed discharge resources and transportation as appropriate  - Identify discharge learning needs (meds, wound care, etc.)  - Arrange for interpretive services to assist at discharge as needed  - Refer to Case Management Department for coordinating discharge planning if the patient needs post-hospital services based on physician/advanced practitioner order or complex needs related to functional status, cognitive ability, or social support system  Outcome: Progressing     Problem: Knowledge Deficit  Goal: Patient/family/caregiver demonstrates understanding of disease process, treatment plan, medications, and discharge instructions  Description: Complete learning assessment and assess knowledge base.  Interventions:  - Provide teaching at level of understanding  - Provide teaching via preferred learning methods  Outcome: Progressing

## 2024-06-14 NOTE — CASE MANAGEMENT
[facility] received request for authorization from Care Manager.  Authorization request submitted for: Acute Rehab  Facility Name: [facility] NPI: [id number]  Facility MD: Maddie Ku NPI: [id number]  Authorization initiated by contacting insurance: [facility]  Via: [facility] Portal   Clinicals submitted via Portal attachment   Pending Reference #: [id number]     Care Manager notified: Kierra Santacruz    Updates to authorization status will be noted in chart. Please reach out to CM for updates on any clinical information.

## 2024-06-14 NOTE — ARC ADMISSION
Noted per CM that patient/family have chosen another facility for rehab services, as it is closer to home. Please notify with any changes.

## 2024-06-15 PROBLEM — E87.1 HYPONATREMIA: Status: ACTIVE | Noted: 2024-06-15

## 2024-06-15 LAB
ALBUMIN SERPL BCP-MCNC: 3.4 G/DL (ref 3.5–5)
ALP SERPL-CCNC: 255 U/L (ref 34–104)
ALT SERPL W P-5'-P-CCNC: 42 U/L (ref 7–52)
AMMONIA PLAS-SCNC: 89 UMOL/L (ref 18–72)
ANION GAP SERPL CALCULATED.3IONS-SCNC: 5 MMOL/L (ref 4–13)
AST SERPL W P-5'-P-CCNC: 77 U/L (ref 13–39)
BILIRUB SERPL-MCNC: 1.82 MG/DL (ref 0.2–1)
BUN SERPL-MCNC: 13 MG/DL (ref 5–25)
CALCIUM ALBUM COR SERPL-MCNC: 9.5 MG/DL (ref 8.3–10.1)
CALCIUM SERPL-MCNC: 9 MG/DL (ref 8.4–10.2)
CHLORIDE SERPL-SCNC: 93 MMOL/L (ref 96–108)
CO2 SERPL-SCNC: 33 MMOL/L (ref 21–32)
CREAT SERPL-MCNC: 1.18 MG/DL (ref 0.6–1.3)
ERYTHROCYTE [DISTWIDTH] IN BLOOD BY AUTOMATED COUNT: 13.4 % (ref 11.6–15.1)
GFR SERPL CREATININE-BSD FRML MDRD: 52 ML/MIN/1.73SQ M
GLUCOSE SERPL-MCNC: 170 MG/DL (ref 65–140)
HCT VFR BLD AUTO: 38 % (ref 34.8–46.1)
HGB BLD-MCNC: 12.3 G/DL (ref 11.5–15.4)
INR PPP: 1.31 (ref 0.84–1.19)
MAGNESIUM SERPL-MCNC: 1.8 MG/DL (ref 1.9–2.7)
MCH RBC QN AUTO: 31.9 PG (ref 26.8–34.3)
MCHC RBC AUTO-ENTMCNC: 32.4 G/DL (ref 31.4–37.4)
MCV RBC AUTO: 99 FL (ref 82–98)
OSMOLALITY UR/SERPL-RTO: 293 MMOL/KG (ref 282–298)
OSMOLALITY UR: 243 MMOL/KG
PLATELET # BLD AUTO: 75 THOUSANDS/UL (ref 149–390)
PMV BLD AUTO: 10.1 FL (ref 8.9–12.7)
POTASSIUM SERPL-SCNC: 3.7 MMOL/L (ref 3.5–5.3)
PROT SERPL-MCNC: 8.3 G/DL (ref 6.4–8.4)
PROTHROMBIN TIME: 16.6 SECONDS (ref 11.6–14.5)
RBC # BLD AUTO: 3.85 MILLION/UL (ref 3.81–5.12)
SODIUM 24H UR-SCNC: 32 MOL/L
SODIUM SERPL-SCNC: 131 MMOL/L (ref 135–147)
WBC # BLD AUTO: 4.64 THOUSAND/UL (ref 4.31–10.16)

## 2024-06-15 PROCEDURE — 85027 COMPLETE CBC AUTOMATED: CPT

## 2024-06-15 PROCEDURE — 85610 PROTHROMBIN TIME: CPT

## 2024-06-15 PROCEDURE — 84300 ASSAY OF URINE SODIUM: CPT

## 2024-06-15 PROCEDURE — 80053 COMPREHEN METABOLIC PANEL: CPT

## 2024-06-15 PROCEDURE — 99232 SBSQ HOSP IP/OBS MODERATE 35: CPT

## 2024-06-15 PROCEDURE — 83930 ASSAY OF BLOOD OSMOLALITY: CPT

## 2024-06-15 PROCEDURE — 82140 ASSAY OF AMMONIA: CPT

## 2024-06-15 PROCEDURE — 83735 ASSAY OF MAGNESIUM: CPT

## 2024-06-15 PROCEDURE — 83935 ASSAY OF URINE OSMOLALITY: CPT

## 2024-06-15 RX ORDER — MAGNESIUM SULFATE HEPTAHYDRATE 40 MG/ML
2 INJECTION, SOLUTION INTRAVENOUS ONCE
Status: COMPLETED | OUTPATIENT
Start: 2024-06-15 | End: 2024-06-15

## 2024-06-15 RX ORDER — LACTULOSE 10 G/15ML
30 SOLUTION ORAL
Status: DISCONTINUED | OUTPATIENT
Start: 2024-06-15 | End: 2024-06-16

## 2024-06-15 RX ADMIN — AMITRIPTYLINE HYDROCHLORIDE 75 MG: 25 TABLET, FILM COATED ORAL at 22:38

## 2024-06-15 RX ADMIN — OXYCODONE HYDROCHLORIDE 10 MG: 10 TABLET ORAL at 12:53

## 2024-06-15 RX ADMIN — DAPSONE 50 MG: 25 TABLET ORAL at 08:19

## 2024-06-15 RX ADMIN — FOLIC ACID 1 MG: 1 TABLET ORAL at 08:12

## 2024-06-15 RX ADMIN — SERTRALINE HYDROCHLORIDE 100 MG: 100 TABLET ORAL at 08:12

## 2024-06-15 RX ADMIN — LACTULOSE 30 G: 20 SOLUTION ORAL at 12:53

## 2024-06-15 RX ADMIN — OXYCODONE HYDROCHLORIDE 10 MG: 10 TABLET ORAL at 05:27

## 2024-06-15 RX ADMIN — TORSEMIDE 20 MG: 20 TABLET ORAL at 17:51

## 2024-06-15 RX ADMIN — ACETAMINOPHEN 650 MG: 325 TABLET ORAL at 17:57

## 2024-06-15 RX ADMIN — HYDROXYCHLOROQUINE SULFATE 200 MG: 200 TABLET, FILM COATED ORAL at 08:12

## 2024-06-15 RX ADMIN — ONDANSETRON 4 MG: 2 INJECTION INTRAMUSCULAR; INTRAVENOUS at 08:36

## 2024-06-15 RX ADMIN — POLYETHYLENE GLYCOL 3350 17 G: 17 POWDER, FOR SOLUTION ORAL at 08:12

## 2024-06-15 RX ADMIN — ACETAMINOPHEN 650 MG: 325 TABLET ORAL at 08:14

## 2024-06-15 RX ADMIN — LACTULOSE 30 G: 20 SOLUTION ORAL at 11:48

## 2024-06-15 RX ADMIN — GABAPENTIN 100 MG: 100 CAPSULE ORAL at 17:51

## 2024-06-15 RX ADMIN — SPIRONOLACTONE 50 MG: 25 TABLET ORAL at 08:12

## 2024-06-15 RX ADMIN — OXYCODONE HYDROCHLORIDE 10 MG: 10 TABLET ORAL at 19:32

## 2024-06-15 RX ADMIN — GABAPENTIN 100 MG: 100 CAPSULE ORAL at 08:12

## 2024-06-15 RX ADMIN — HYDROCORTISONE: 1 CREAM TOPICAL at 17:51

## 2024-06-15 RX ADMIN — MAGNESIUM SULFATE HEPTAHYDRATE 2 G: 40 INJECTION, SOLUTION INTRAVENOUS at 08:13

## 2024-06-15 RX ADMIN — RIFAXIMIN 550 MG: 550 TABLET ORAL at 08:12

## 2024-06-15 RX ADMIN — TORSEMIDE 20 MG: 20 TABLET ORAL at 05:27

## 2024-06-15 RX ADMIN — LACTULOSE 30 G: 20 SOLUTION ORAL at 08:12

## 2024-06-15 RX ADMIN — HYDROXYCHLOROQUINE SULFATE 200 MG: 200 TABLET, FILM COATED ORAL at 17:51

## 2024-06-15 RX ADMIN — HYDROCORTISONE: 1 CREAM TOPICAL at 08:19

## 2024-06-15 RX ADMIN — RIFAXIMIN 550 MG: 550 TABLET ORAL at 17:51

## 2024-06-15 NOTE — PLAN OF CARE
Problem: PAIN - ADULT  Goal: Verbalizes/displays adequate comfort level or baseline comfort level  Description: Interventions:  - Encourage patient to monitor pain and request assistance  - Assess pain using appropriate pain scale  - Administer analgesics based on type and severity of pain and evaluate response  - Implement non-pharmacological measures as appropriate and evaluate response  - Consider cultural and social influences on pain and pain management  - Notify physician/advanced practitioner if interventions unsuccessful or patient reports new pain  Outcome: Progressing     Problem: INFECTION - ADULT  Goal: Absence or prevention of progression during hospitalization  Description: INTERVENTIONS:  - Assess and monitor for signs and symptoms of infection  - Monitor lab/diagnostic results  - Monitor all insertion sites, i.e. indwelling lines, tubes, and drains  - Monitor endotracheal if appropriate and nasal secretions for changes in amount and color  - [place] appropriate cooling/warming therapies per order  - Administer medications as ordered  - Instruct and encourage patient and family to use good hand hygiene technique  - Identify and instruct in appropriate isolation precautions for identified infection/condition  Outcome: Progressing  Goal: Absence of fever/infection during neutropenic period  Description: INTERVENTIONS:  - Monitor WBC    Outcome: Progressing     Problem: SAFETY ADULT  Goal: Patient will remain free of falls  Description: INTERVENTIONS:  - Educate patient/family on patient safety including physical limitations  - Instruct patient to call for assistance with activity   - Consult OT/PT to assist with strengthening/mobility   - Keep Call bell within reach  - Keep bed low and locked with side rails adjusted as appropriate  - Keep care items and personal belongings within reach  - Initiate and maintain comfort rounds  - Make Fall Risk Sign visible to staff  - Offer Toileting every 2 Hours,  in advance of need  - Initiate/Maintain alarm  - Obtain necessary fall risk management equipment  - Apply yellow socks and bracelet for high fall risk patients  - Consider moving patient to room near nurses station  Outcome: Progressing  Goal: Maintain or return to baseline ADL function  Description: INTERVENTIONS:  -  Assess patient's ability to carry out ADLs; assess patient's baseline for ADL function and identify physical deficits which impact ability to perform ADLs (bathing, care of mouth/teeth, toileting, grooming, dressing, etc.)  - Assess/evaluate cause of self-care deficits   - Assess range of motion  - Assess patient's mobility; develop plan if impaired  - Assess patient's need for assistive devices and provide as appropriate  - Encourage maximum independence but intervene and supervise when necessary  - Involve family in performance of ADLs  - Assess for home care needs following discharge   - Consider OT consult to assist with ADL evaluation and planning for discharge  - Provide patient education as appropriate  Outcome: Progressing  Goal: Maintains/Returns to pre admission functional level  Description: INTERVENTIONS:  - Perform AM-PAC 6 Click Basic Mobility/ Daily Activity assessment daily.  - Set and communicate daily mobility goal to care team and patient/family/caregiver.   - Collaborate with rehabilitation services on mobility goals if consulted  - Perform Range of Motion 3 times a day.  - Reposition patient every 2 hours.  - Dangle patient 3 times a day  - Stand patient 3 times a day  - Ambulate patient 3 times a day  - Out of bed to chair 3 times a day   - Out of bed for meals 3 times a day  - Out of bed for toileting  - Record patient progress and toleration of activity level   Outcome: Progressing     Problem: DISCHARGE PLANNING  Goal: Discharge to home or other facility with appropriate resources  Description: INTERVENTIONS:  - Identify barriers to discharge w/patient and caregiver  - Arrange  for needed discharge resources and transportation as appropriate  - Identify discharge learning needs (meds, wound care, etc.)  - Arrange for interpretive services to assist at discharge as needed  - Refer to Case Management Department for coordinating discharge planning if the patient needs post-hospital services based on physician/advanced practitioner order or complex needs related to functional status, cognitive ability, or social support system  Outcome: Progressing     Problem: Knowledge Deficit  Goal: Patient/family/caregiver demonstrates understanding of disease process, treatment plan, medications, and discharge instructions  Description: Complete learning assessment and assess knowledge base.  Interventions:  - Provide teaching at level of understanding  - Provide teaching via preferred learning methods  Outcome: Progressing

## 2024-06-15 NOTE — NURSING NOTE
Patient requesting Lactulose dosing/frequency be decreased due to frequent Bms and inability to tolerate Q2 hr frequency. Pt has had 4 Bms today which is the goal with increased frequency of Lactulose. Provider contacted regarding the same-per provider hold remaining Lactulose doses overnight and resume tomorrow morning. Notified oncoming RN of same.

## 2024-06-15 NOTE — ASSESSMENT & PLAN NOTE
"Presents to ED with increasing weakness and shakes with slight confusion for the last 3 to 4 days  Has history of known hepatic cirrhosis, states she was off lactulose for a week  Reviewed recent discharge diagnosis -was instructed to continue taking lactulose 10g TID  CT head revealing \" no acute intercranial abnormality\"  Ammonia 159  LFTs  -AST mildly elevated, bilirubin mildly elevated  INR 1.7 and down trending   Aspiration precautions  Avoid any sedatives or hepatotoxic medications if at all possible  No BM in 24 hours despite increased dose of lactulose. Start 30mg every 2 hours until 3-4 BM occur in 24 hours  Intake and output with BM counts to assist with lactulose titration  No ascites noted on CT abdomen pelvis but noted to have large stool burden.  Patient also is on chronic narcotic regimen which probably makes her more constipated than usual.    Continue Xifaxan as per home regimen  "

## 2024-06-15 NOTE — PROGRESS NOTES
"[facility]  Progress Note  Name: Bailey Olsen I  MRN: [id number]  Unit/Bed#: -01 I Date of Admission: 6/11/2024   Date of Service: 6/15/2024 I Hospital Day: 4    Assessment & Plan   * Hepatic encephalopathy (HCC)  Assessment & Plan  Presents to ED with increasing weakness and shakes with slight confusion for the last 3 to 4 days  Has history of known hepatic cirrhosis, states she was off lactulose for a week  Reviewed recent discharge diagnosis -was instructed to continue taking lactulose 10g TID  CT head revealing \" no acute intercranial abnormality\"  Ammonia 159  LFTs  -AST mildly elevated, bilirubin mildly elevated  INR 1.7 and down trending   Aspiration precautions  Avoid any sedatives or hepatotoxic medications if at all possible  No BM in 24 hours despite increased dose of lactulose. Start 30mg every 2 hours until 3-4 BM occur in 24 hours  Intake and output with BM counts to assist with lactulose titration  No ascites noted on CT abdomen pelvis but noted to have large stool burden.  Patient also is on chronic narcotic regimen which probably makes her more constipated than usual.    Continue Xifaxan as per home regimen    Hyponatremia  Assessment & Plan  Sodium studies pending   Continue home diuretic regimen     Fall  Assessment & Plan  Patient experienced a fall in the ED  Still in the bathroom hitting head off sink  Repeat head CT no acute abnormality  PT/OT consult    Chronic diastolic (congestive) heart failure (HCC)  Assessment & Plan  Wt Readings from Last 3 Encounters:   06/11/24 118 kg (260 lb)   06/02/24 119 kg (263 lb 0.1 oz)     Recently discharged with weight 263 pounds  Echo 5/28/2024 -EF 65 to 69% with normal systolic function, normal wall motion and normal diastolic function, no significant valvular abnormality  On torsemide 20 mg twice daily, spironolactone 50 mg daily  Continue diuretics  Not in acute exacerbation    Sarcoidosis  Assessment & Plan  History " of sarcoidosis.cont dapsone    Pancytopenia (HCC)  Assessment & Plan  Chronic secondary to cirrhosis   Stable at baseline     Class 3 severe obesity due to excess calories in adult (HCC)  Assessment & Plan  Recommend diet and lifestyle management    Other cirrhosis of liver (HCC)  Assessment & Plan  Chronically maintained on spironolactone 50 mg daily, torsemide 20 mg twice daily patient is vaccinated for 50 mg twice daily and lactulose 20 g as needed daily  Presenting with hepatic encephalopathy  CT abdomen pelvis -cirrhotic liver with portal hypertension and splenomegaly, no acute findings  With distended abdomen secondary to constipation.  No ascites noted             VTE Pharmacologic Prophylaxis: VTE Score: 4 Moderate Risk (Score 3-4) - Pharmacological DVT Prophylaxis Contraindicated. Sequential Compression Devices Ordered.    Mobility:   Basic Mobility Inpatient Raw Score: 17  JH-HLM Goal: 5: Stand one or more mins  JH-HLM Achieved: 7: Walk 25 feet or more  JH-HLM Goal achieved. Continue to encourage appropriate mobility.    Patient Centered Rounds: I performed bedside rounds with nursing staff today.   Discussions with Specialists or Other Care Team Provider: nursing and cm    Education and Discussions with Family / Patient: Patient declined call to .     Total Time Spent on Date of Encounter in care of patient:  mins. This time was spent on one or more of the following: performing physical exam; counseling and coordination of care; obtaining or reviewing history; documenting in the medical record; reviewing/ordering tests, medications or procedures; communicating with other healthcare professionals and discussing with patient's family/caregivers.    Current Length of Stay: 4 day(s)  Current Patient Status: Inpatient   Certification Statement: The patient will continue to require additional inpatient hospital stay due to requiring increase in lactulose to have 3-4 bm a day  Discharge Plan:  Anticipate discharge in 24-48 hrs to rehab facility.    Code Status: Level 1 - Full Code    Subjective:   Patient states that she is still tired and confused. Denies chest pain, shortness of breath and abdominal pain. Does not offer any further complaints at this time.     Objective:     Vitals:   Temp (24hrs), Av.8 °F (36.6 °C), Min:97.5 °F (36.4 °C), Max:97.9 °F (36.6 °C)    Temp:  [97.5 °F (36.4 °C)-97.9 °F (36.6 °C)] 97.5 °F (36.4 °C)  HR:  [86] 86  Resp:  [18] 18  BP: (113-145)/(69-88) 145/88  SpO2:  [91 %-93 %] 93 %  Body mass index is 43.27 kg/m².     Input and Output Summary (last 24 hours):     Intake/Output Summary (Last 24 hours) at 6/15/2024 0922  Last data filed at 2024 1900  Gross per 24 hour   Intake 1080 ml   Output 400 ml   Net 680 ml       Physical Exam:   Physical Exam  Vitals reviewed.   Constitutional:       General: She is not in acute distress.     Appearance: Normal appearance. She is obese. She is not ill-appearing.   HENT:      Head: Normocephalic and atraumatic.      Nose: Nose normal.      Mouth/Throat:      Mouth: Mucous membranes are moist.      Pharynx: Oropharynx is clear.   Eyes:      Extraocular Movements: Extraocular movements intact.      Conjunctiva/sclera: Conjunctivae normal.   Cardiovascular:      Rate and Rhythm: Normal rate and regular rhythm.      Pulses: Normal pulses.      Heart sounds: Normal heart sounds. No murmur heard.  Pulmonary:      Effort: Pulmonary effort is normal. No respiratory distress.      Breath sounds: Normal breath sounds. No wheezing or rhonchi.   Abdominal:      General: Abdomen is flat. Bowel sounds are normal. There is no distension.      Palpations: Abdomen is soft.      Tenderness: There is no abdominal tenderness. There is no guarding.   Musculoskeletal:         General: Normal range of motion.      Cervical back: Normal range of motion.      Right lower leg: No edema.      Left lower leg: No edema.   Skin:     General: Skin is warm.       Findings: Bruising present.      Comments: Bruising to left forehead    Neurological:      General: No focal deficit present.      Mental Status: She is alert and oriented to person, place, and time. Mental status is at baseline.      Motor: No weakness.   Psychiatric:         Mood and Affect: Mood normal.         Behavior: Behavior normal.         Thought Content: Thought content normal.         Judgment: Judgment normal.          Additional Data:     Labs:  Results from last 7 days   Lab Units 06/15/24  0546 06/12/24  0514 06/11/24  1158   WBC Thousand/uL 4.64   < > 2.58*   HEMOGLOBIN g/dL 12.3   < > 12.3   HEMATOCRIT % 38.0   < > 37.7   PLATELETS Thousands/uL 75*   < > 108*   SEGS PCT %  --   --  58   LYMPHO PCT %  --   --  30   MONO PCT %  --   --  7   EOS PCT %  --   --  3    < > = values in this interval not displayed.     Results from last 7 days   Lab Units 06/15/24  0546   SODIUM mmol/L 131*   POTASSIUM mmol/L 3.7   CHLORIDE mmol/L 93*   CO2 mmol/L 33*   BUN mg/dL 13   CREATININE mg/dL 1.18   ANION GAP mmol/L 5   CALCIUM mg/dL 9.0   ALBUMIN g/dL 3.4*   TOTAL BILIRUBIN mg/dL 1.82*   ALK PHOS U/L 255*   ALT U/L 42   AST U/L 77*   GLUCOSE RANDOM mg/dL 170*     Results from last 7 days   Lab Units 06/15/24  0546   INR  1.31*             Results from last 7 days   Lab Units 06/11/24  1158   LACTIC ACID mmol/L 1.8       Lines/Drains:  Invasive Devices       Peripheral Intravenous Line  Duration             Peripheral IV 06/15/24 Proximal;Right;Ventral (anterior) Forearm <1 day                          Imaging: No pertinent imaging reviewed.    Recent Cultures (last 7 days):   Results from last 7 days   Lab Units 06/11/24  1158   BLOOD CULTURE  No Growth at 72 hrs.  No Growth at 72 hrs.       Last 24 Hours Medication List:   Current Facility-Administered Medications   Medication Dose Route Frequency Provider Last Rate    acetaminophen  650 mg Oral Q6H PRN Laura Ariza PA-C      amitriptyline  75 mg Oral HS  Laura Ariza PA-C      dapsone  50 mg Oral Daily Laura Ariza PA-C      folic acid  1 mg Oral Daily Laura Ariza PA-C      gabapentin  100 mg Oral BID Laura Ariza PA-C      hydrocortisone   Topical BID Laura Ariza PA-C      hydroxychloroquine  200 mg Oral BID With Meals Laura Ariza PA-C      lactulose  30 g Oral Q2H Silva Giron PA-C      magnesium sulfate  2 g Intravenous Once Silva Giron PA-C 2 g (06/15/24 0813)    ondansetron  4 mg Intravenous Q6H PRN Laura Ariza PA-C      oxyCODONE  10 mg Oral Q6H PRN Laura Ariza PA-C      polyethylene glycol  17 g Oral Daily Idalia Stuart MD      rifaximin  550 mg Oral BID Laura Ariza PA-C      sertraline  100 mg Oral Daily Laura Ariza PA-C      spironolactone  50 mg Oral Daily Laura Ariza PA-C      torsemide  20 mg Oral BID AC Laura Ariza PA-C          Today, Patient Was Seen By: Silva Giron PA-C    **Please Note: This note may have been constructed using a voice recognition system.**

## 2024-06-15 NOTE — PLAN OF CARE
Problem: SAFETY ADULT  Goal: Patient will remain free of falls  Description: INTERVENTIONS:  - Educate patient/family on patient safety including physical limitations  - Instruct patient to call for assistance with activity   - Consult OT/PT to assist with strengthening/mobility   - Keep Call bell within reach  - Keep bed low and locked with side rails adjusted as appropriate  - Keep care items and personal belongings within reach  - Initiate and maintain comfort rounds  - Make Fall Risk Sign visible to staff  - Offer Toileting every 2 Hours, in advance of need  - Initiate/Maintain bed/chair alarm  - Obtain necessary fall risk management equipment: bed/chair alarm, walker  - Apply yellow socks and bracelet for high fall risk patients  - Consider moving patient to room near nurses station  Outcome: Progressing  Goal: Maintain or return to baseline ADL function  Description: INTERVENTIONS:  -  Assess patient's ability to carry out ADLs; assess patient's baseline for ADL function and identify physical deficits which impact ability to perform ADLs (bathing, care of mouth/teeth, toileting, grooming, dressing, etc.)  - Assess/evaluate cause of self-care deficits   - Assess range of motion  - Assess patient's mobility; develop plan if impaired  - Assess patient's need for assistive devices and provide as appropriate  - Encourage maximum independence but intervene and supervise when necessary  - Involve family in performance of ADLs  - Assess for home care needs following discharge   - Consider OT consult to assist with ADL evaluation and planning for discharge  - Provide patient education as appropriate  Outcome: Progressing  Goal: Maintains/Returns to pre admission functional level  Description: INTERVENTIONS:  - Perform AM-PAC 6 Click Basic Mobility/ Daily Activity assessment daily.  - Set and communicate daily mobility goal to care team and patient/family/caregiver.   - Collaborate with rehabilitation services on  mobility goals if consulted  - Perform Range of Motion 3 times a day.  - Reposition patient every 2 hours.  - Ambulate patient 3 times a day  - Out of bed to chair 3 times a day   - Out of bed for meals 3 times a day  - Out of bed for toileting  - Record patient progress and toleration of activity level   Outcome: Progressing     Problem: DISCHARGE PLANNING  Goal: Discharge to home or other facility with appropriate resources  Description: INTERVENTIONS:  - Identify barriers to discharge w/patient and caregiver  - Arrange for needed discharge resources and transportation as appropriate  - Identify discharge learning needs (meds, wound care, etc.)  - Arrange for interpretive services to assist at discharge as needed  - Refer to Case Management Department for coordinating discharge planning if the patient needs post-hospital services based on physician/advanced practitioner order or complex needs related to functional status, cognitive ability, or social support system  Outcome: Progressing     Problem: Knowledge Deficit  Goal: Patient/family/caregiver demonstrates understanding of disease process, treatment plan, medications, and discharge instructions  Description: Complete learning assessment and assess knowledge base.  Interventions:  - Provide teaching at level of understanding  - Provide teaching via preferred learning methods  Outcome: Progressing

## 2024-06-16 LAB
ALBUMIN SERPL BCP-MCNC: 3.1 G/DL (ref 3.5–5)
ALP SERPL-CCNC: 223 U/L (ref 34–104)
ALT SERPL W P-5'-P-CCNC: 35 U/L (ref 7–52)
AMMONIA PLAS-SCNC: 104 UMOL/L (ref 18–72)
AMMONIA PLAS-SCNC: 63 UMOL/L (ref 18–72)
ANION GAP SERPL CALCULATED.3IONS-SCNC: 5 MMOL/L (ref 4–13)
AST SERPL W P-5'-P-CCNC: 65 U/L (ref 13–39)
BACTERIA BLD CULT: NORMAL
BACTERIA BLD CULT: NORMAL
BILIRUB SERPL-MCNC: 1.57 MG/DL (ref 0.2–1)
BUN SERPL-MCNC: 15 MG/DL (ref 5–25)
CALCIUM ALBUM COR SERPL-MCNC: 9.2 MG/DL (ref 8.3–10.1)
CALCIUM SERPL-MCNC: 8.5 MG/DL (ref 8.4–10.2)
CHLORIDE SERPL-SCNC: 95 MMOL/L (ref 96–108)
CO2 SERPL-SCNC: 31 MMOL/L (ref 21–32)
CREAT SERPL-MCNC: 1.15 MG/DL (ref 0.6–1.3)
GFR SERPL CREATININE-BSD FRML MDRD: 54 ML/MIN/1.73SQ M
GLUCOSE SERPL-MCNC: 137 MG/DL (ref 65–140)
MAGNESIUM SERPL-MCNC: 2.1 MG/DL (ref 1.9–2.7)
POTASSIUM SERPL-SCNC: 3.7 MMOL/L (ref 3.5–5.3)
PROT SERPL-MCNC: 7.7 G/DL (ref 6.4–8.4)
SODIUM SERPL-SCNC: 131 MMOL/L (ref 135–147)

## 2024-06-16 PROCEDURE — 80053 COMPREHEN METABOLIC PANEL: CPT

## 2024-06-16 PROCEDURE — 99232 SBSQ HOSP IP/OBS MODERATE 35: CPT

## 2024-06-16 PROCEDURE — 82140 ASSAY OF AMMONIA: CPT

## 2024-06-16 PROCEDURE — 83735 ASSAY OF MAGNESIUM: CPT

## 2024-06-16 RX ORDER — LACTULOSE 10 G/15ML
30 SOLUTION ORAL EVERY 4 HOURS
Status: DISCONTINUED | OUTPATIENT
Start: 2024-06-16 | End: 2024-06-16

## 2024-06-16 RX ORDER — LACTULOSE 10 G/15ML
30 SOLUTION ORAL 4 TIMES DAILY
Status: DISCONTINUED | OUTPATIENT
Start: 2024-06-16 | End: 2024-06-16

## 2024-06-16 RX ORDER — LACTULOSE 10 G/15ML
20 SOLUTION ORAL 3 TIMES DAILY
Status: DISCONTINUED | OUTPATIENT
Start: 2024-06-17 | End: 2024-06-18

## 2024-06-16 RX ORDER — LACTULOSE 10 G/15ML
30 SOLUTION ORAL 4 TIMES DAILY
Status: CANCELLED | OUTPATIENT
Start: 2024-06-16

## 2024-06-16 RX ADMIN — TORSEMIDE 20 MG: 20 TABLET ORAL at 06:13

## 2024-06-16 RX ADMIN — AMITRIPTYLINE HYDROCHLORIDE 75 MG: 25 TABLET, FILM COATED ORAL at 21:41

## 2024-06-16 RX ADMIN — RIFAXIMIN 550 MG: 550 TABLET ORAL at 18:37

## 2024-06-16 RX ADMIN — HYDROXYCHLOROQUINE SULFATE 200 MG: 200 TABLET, FILM COATED ORAL at 08:21

## 2024-06-16 RX ADMIN — OXYCODONE HYDROCHLORIDE 10 MG: 10 TABLET ORAL at 21:42

## 2024-06-16 RX ADMIN — HYDROXYCHLOROQUINE SULFATE 200 MG: 200 TABLET, FILM COATED ORAL at 15:33

## 2024-06-16 RX ADMIN — LACTULOSE 30 G: 20 SOLUTION ORAL at 11:49

## 2024-06-16 RX ADMIN — OXYCODONE HYDROCHLORIDE 10 MG: 10 TABLET ORAL at 02:39

## 2024-06-16 RX ADMIN — HYDROCORTISONE: 1 CREAM TOPICAL at 08:28

## 2024-06-16 RX ADMIN — ONDANSETRON 4 MG: 2 INJECTION INTRAMUSCULAR; INTRAVENOUS at 09:49

## 2024-06-16 RX ADMIN — TORSEMIDE 20 MG: 20 TABLET ORAL at 15:33

## 2024-06-16 RX ADMIN — POLYETHYLENE GLYCOL 3350 17 G: 17 POWDER, FOR SOLUTION ORAL at 08:21

## 2024-06-16 RX ADMIN — DAPSONE 50 MG: 25 TABLET ORAL at 08:23

## 2024-06-16 RX ADMIN — OXYCODONE HYDROCHLORIDE 10 MG: 10 TABLET ORAL at 15:33

## 2024-06-16 RX ADMIN — GABAPENTIN 100 MG: 100 CAPSULE ORAL at 08:21

## 2024-06-16 RX ADMIN — GABAPENTIN 100 MG: 100 CAPSULE ORAL at 18:37

## 2024-06-16 RX ADMIN — LACTULOSE 30 G: 20 SOLUTION ORAL at 02:33

## 2024-06-16 RX ADMIN — FOLIC ACID 1 MG: 1 TABLET ORAL at 08:21

## 2024-06-16 RX ADMIN — LACTULOSE 30 G: 20 SOLUTION ORAL at 06:00

## 2024-06-16 RX ADMIN — LACTULOSE 30 G: 20 SOLUTION ORAL at 08:21

## 2024-06-16 RX ADMIN — HYDROCORTISONE: 1 CREAM TOPICAL at 18:38

## 2024-06-16 RX ADMIN — OXYCODONE HYDROCHLORIDE 10 MG: 10 TABLET ORAL at 08:28

## 2024-06-16 RX ADMIN — SERTRALINE HYDROCHLORIDE 100 MG: 100 TABLET ORAL at 08:21

## 2024-06-16 RX ADMIN — RIFAXIMIN 550 MG: 550 TABLET ORAL at 08:21

## 2024-06-16 RX ADMIN — SPIRONOLACTONE 50 MG: 25 TABLET ORAL at 08:21

## 2024-06-16 NOTE — PLAN OF CARE
Problem: SAFETY ADULT  Goal: Patient will remain free of falls  Description: INTERVENTIONS:  - Educate patient/family on patient safety including physical limitations  - Instruct patient to call for assistance with activity   - Consult OT/PT to assist with strengthening/mobility   - Keep Call bell within reach  - Keep bed low and locked with side rails adjusted as appropriate  - Keep care items and personal belongings within reach  - Initiate and maintain comfort rounds  - Make Fall Risk Sign visible to staff  - Offer Toileting every 2 Hours, in advance of need  - Initiate/Maintain bed/chair alarm  - Obtain necessary fall risk management equipment: bed/chair alarm, walker  - Apply yellow socks and bracelet for high fall risk patients  - Consider moving patient to room near nurses station  Outcome: Progressing  Goal: Maintain or return to baseline ADL function  Description: INTERVENTIONS:  -  Assess patient's ability to carry out ADLs; assess patient's baseline for ADL function and identify physical deficits which impact ability to perform ADLs (bathing, care of mouth/teeth, toileting, grooming, dressing, etc.)  - Assess/evaluate cause of self-care deficits   - Assess range of motion  - Assess patient's mobility; develop plan if impaired  - Assess patient's need for assistive devices and provide as appropriate  - Encourage maximum independence but intervene and supervise when necessary  - Involve family in performance of ADLs  - Assess for home care needs following discharge   - Consider OT consult to assist with ADL evaluation and planning for discharge  - Provide patient education as appropriate  Outcome: Progressing  Goal: Maintains/Returns to pre admission functional level  Description: INTERVENTIONS:  - Perform AM-PAC 6 Click Basic Mobility/ Daily Activity assessment daily.  - Set and communicate daily mobility goal to care team and patient/family/caregiver.   - Collaborate with rehabilitation services on  mobility goals if consulted  - Perform Range of Motion 3 times a day.  - Reposition patient every 2 hours.  - Ambulate patient 3 times a day  - Out of bed to chair 3 times a day   - Out of bed for meals 3 times a day  - Out of bed for toileting  - Record patient progress and toleration of activity level   Outcome: Progressing

## 2024-06-16 NOTE — ASSESSMENT & PLAN NOTE
"Presents to ED with increasing weakness and shakes with slight confusion for the last 3 to 4 days  Has history of known hepatic cirrhosis, states she was off lactulose for a week  Reviewed recent discharge diagnosis -was instructed to continue taking lactulose 10g TID  CT head revealing \" no acute intercranial abnormality\"  Ammonia 159  LFTs  -AST mildly elevated, bilirubin mildly elevated  INR 1.7 and down trending   Aspiration precautions  Avoid any sedatives or hepatotoxic medications   4 BM on 6/15, but ammonia still elevated. Decrease lactulose from every 2 hour to 30mg every 4 hours until 4-5 BM occur in 24 hours  Intake and output with BM counts to assist with lactulose titration  No ascites noted on CT abdomen pelvis but noted to have large stool burden.  Patient also is on chronic narcotic regimen which probably makes her more constipated than usual.    Continue Xifaxan as per home regimen  "

## 2024-06-16 NOTE — PROGRESS NOTES
"[facility]  Progress Note  Name: Bailey Olsen I  MRN: [id number]  Unit/Bed#: -01 I Date of Admission: 6/11/2024   Date of Service: 6/16/2024 I Hospital Day: 5    Assessment & Plan   * Hepatic encephalopathy (HCC)  Assessment & Plan  Presents to ED with increasing weakness and shakes with slight confusion for the last 3 to 4 days  Has history of known hepatic cirrhosis, states she was off lactulose for a week  Reviewed recent discharge diagnosis -was instructed to continue taking lactulose 10g TID  CT head revealing \" no acute intercranial abnormality\"  Ammonia 159  LFTs  -AST mildly elevated, bilirubin mildly elevated  INR 1.7 and down trending   Aspiration precautions  Avoid any sedatives or hepatotoxic medications   4 BM on 6/15, but ammonia still elevated. Decrease lactulose from every 2 hour to 30mg every 4 hours until 4-5 BM occur in 24 hours  Intake and output with BM counts to assist with lactulose titration  No ascites noted on CT abdomen pelvis but noted to have large stool burden.  Patient also is on chronic narcotic regimen which probably makes her more constipated than usual.    Continue Xifaxan as per home regimen    Hyponatremia  Assessment & Plan  Sodium studies reviewed  With low urine osm, start FR   Continue home diuretic regimen     Fall  Assessment & Plan  Patient experienced a fall in the ED  Still in the bathroom hitting head off sink  Repeat head CT no acute abnormality  PT/OT consult    Chronic diastolic (congestive) heart failure (HCC)  Assessment & Plan  Wt Readings from Last 3 Encounters:   06/11/24 118 kg (260 lb)   06/02/24 119 kg (263 lb 0.1 oz)     Recently discharged with weight 263 pounds  Echo 5/28/2024 -EF 65 to 69% with normal systolic function, normal wall motion and normal diastolic function, no significant valvular abnormality  On torsemide 20 mg twice daily, spironolactone 50 mg daily  Continue diuretics  Not in acute " exacerbation    Sarcoidosis  Assessment & Plan  History of sarcoidosis.cont dapsone    Pancytopenia (HCC)  Assessment & Plan  Chronic secondary to cirrhosis   Stable at baseline     Class 3 severe obesity due to excess calories in adult (HCC)  Assessment & Plan  Recommend diet and lifestyle management    Other cirrhosis of liver (HCC)  Assessment & Plan  Chronically maintained on spironolactone 50 mg daily, torsemide 20 mg twice daily patient is vaccinated for 50 mg twice daily and lactulose 20 g as needed daily  Presenting with hepatic encephalopathy  CT abdomen pelvis -cirrhotic liver with portal hypertension and splenomegaly, no acute findings  With distended abdomen secondary to constipation.  No ascites noted             VTE Pharmacologic Prophylaxis: VTE Score: 4 Moderate Risk (Score 3-4) - Pharmacological DVT Prophylaxis Contraindicated. Sequential Compression Devices Ordered.    Mobility:   Basic Mobility Inpatient Raw Score: 17  JH-HLM Goal: 5: Stand one or more mins  JH-HLM Achieved: 7: Walk 25 feet or more  JH-HLM Goal achieved. Continue to encourage appropriate mobility.    Patient Centered Rounds: I performed bedside rounds with nursing staff today.   Discussions with Specialists or Other Care Team Provider: nursing and cm    Education and Discussions with Family / Patient: Patient declined call to .     Total Time Spent on Date of Encounter in care of patient:  mins. This time was spent on one or more of the following: performing physical exam; counseling and coordination of care; obtaining or reviewing history; documenting in the medical record; reviewing/ordering tests, medications or procedures; communicating with other healthcare professionals and discussing with patient's family/caregivers.    Current Length of Stay: 5 day(s)  Current Patient Status: Inpatient   Certification Statement: The patient will continue to require additional inpatient hospital stay due to requiring further  bm to clear ammonia with hepatic encephalopathy  Discharge Plan: Anticipate discharge in 24-48 hrs to rehab facility.    Code Status: Level 1 - Full Code    Subjective:   Patient states that she is anxious about leaving because she wants to make sure that she  can go to the acute rehab. Denies chest pain, shortness of breath.     Objective:     Vitals:   Temp (24hrs), Av °F (36.7 °C), Min:97.9 °F (36.6 °C), Max:98.2 °F (36.8 °C)    Temp:  [97.9 °F (36.6 °C)-98.2 °F (36.8 °C)] 97.9 °F (36.6 °C)  HR:  [75-87] 75  Resp:  [18] 18  BP: (116-146)/(63-79) 117/68  SpO2:  [92 %-96 %] 93 %  Body mass index is 43.27 kg/m².     Input and Output Summary (last 24 hours):     Intake/Output Summary (Last 24 hours) at 2024 0840  Last data filed at 2024 0614  Gross per 24 hour   Intake 1620 ml   Output 3200 ml   Net -1580 ml       Physical Exam:   Physical Exam  Vitals reviewed.   Constitutional:       General: She is not in acute distress.     Appearance: Normal appearance. She is obese. She is not ill-appearing.   HENT:      Head: Normocephalic and atraumatic.      Nose: Nose normal.      Mouth/Throat:      Mouth: Mucous membranes are moist.      Pharynx: Oropharynx is clear.   Eyes:      Extraocular Movements: Extraocular movements intact.      Conjunctiva/sclera: Conjunctivae normal.   Cardiovascular:      Rate and Rhythm: Normal rate and regular rhythm.      Pulses: Normal pulses.      Heart sounds: Normal heart sounds. No murmur heard.  Pulmonary:      Effort: Pulmonary effort is normal. No respiratory distress.      Breath sounds: Normal breath sounds. No wheezing or rhonchi.   Abdominal:      General: Abdomen is flat. Bowel sounds are normal. There is no distension.      Palpations: Abdomen is soft.      Tenderness: There is no abdominal tenderness. There is no guarding.   Musculoskeletal:         General: Normal range of motion.      Cervical back: Normal range of motion.      Right lower leg: No edema.       Left lower leg: No edema.   Skin:     General: Skin is warm.   Neurological:      General: No focal deficit present.      Mental Status: She is alert. Mental status is at baseline.      Motor: No weakness.      Comments: Oriented to self, place, month, not year   Psychiatric:         Mood and Affect: Mood normal.         Behavior: Behavior normal.         Thought Content: Thought content normal.         Judgment: Judgment normal.          Additional Data:     Labs:  Results from last 7 days   Lab Units 06/15/24  0546 06/12/24  0514 06/11/24  1158   WBC Thousand/uL 4.64   < > 2.58*   HEMOGLOBIN g/dL 12.3   < > 12.3   HEMATOCRIT % 38.0   < > 37.7   PLATELETS Thousands/uL 75*   < > 108*   SEGS PCT %  --   --  58   LYMPHO PCT %  --   --  30   MONO PCT %  --   --  7   EOS PCT %  --   --  3    < > = values in this interval not displayed.     Results from last 7 days   Lab Units 06/16/24  0520   SODIUM mmol/L 131*   POTASSIUM mmol/L 3.7   CHLORIDE mmol/L 95*   CO2 mmol/L 31   BUN mg/dL 15   CREATININE mg/dL 1.15   ANION GAP mmol/L 5   CALCIUM mg/dL 8.5   ALBUMIN g/dL 3.1*   TOTAL BILIRUBIN mg/dL 1.57*   ALK PHOS U/L 223*   ALT U/L 35   AST U/L 65*   GLUCOSE RANDOM mg/dL 137     Results from last 7 days   Lab Units 06/15/24  0546   INR  1.31*             Results from last 7 days   Lab Units 06/11/24  1158   LACTIC ACID mmol/L 1.8       Lines/Drains:  Invasive Devices       Peripheral Intravenous Line  Duration             Peripheral IV 06/15/24 Proximal;Right;Ventral (anterior) Forearm 1 day                          Imaging: No pertinent imaging reviewed.    Recent Cultures (last 7 days):   Results from last 7 days   Lab Units 06/11/24  1158   BLOOD CULTURE  No Growth After 4 Days.  No Growth After 4 Days.       Last 24 Hours Medication List:   Current Facility-Administered Medications   Medication Dose Route Frequency Provider Last Rate    acetaminophen  650 mg Oral Q6H PRN Laura Ariza PA-C      amitriptyline  75 mg  Oral HS Laura Ariza, TERI      dapsone  50 mg Oral Daily Laura Ariza, TERI      folic acid  1 mg Oral Daily Laura Ariza, TERI      gabapentin  100 mg Oral BID Laura Ariza, TERI      hydrocortisone   Topical BID Laura Ariza, TERI      hydroxychloroquine  200 mg Oral BID With Meals Laura Ariza, TERI      lactulose  30 g Oral Q4H Silva Giron PA-C      ondansetron  4 mg Intravenous Q6H PRN Laura Ariza, TERI      oxyCODONE  10 mg Oral Q6H PRN Laura Ariza, TERI      polyethylene glycol  17 g Oral Daily Idalia Stuart MD      rifaximin  550 mg Oral BID Laura Ariza, TERI      sertraline  100 mg Oral Daily Laura Ariza, TERI      spironolactone  50 mg Oral Daily Laura Ariza, TERI      torsemide  20 mg Oral BID AC Laura Ariza, TERI          Today, Patient Was Seen By: Silva Giron PA-C    **Please Note: This note may have been constructed using a voice recognition system.**

## 2024-06-17 LAB
ALBUMIN SERPL BCP-MCNC: 3 G/DL (ref 3.5–5)
ALP SERPL-CCNC: 215 U/L (ref 34–104)
ALT SERPL W P-5'-P-CCNC: 29 U/L (ref 7–52)
AMMONIA PLAS-SCNC: 57 UMOL/L (ref 18–72)
ANION GAP SERPL CALCULATED.3IONS-SCNC: 6 MMOL/L (ref 4–13)
AST SERPL W P-5'-P-CCNC: 60 U/L (ref 13–39)
BILIRUB SERPL-MCNC: 1.42 MG/DL (ref 0.2–1)
BUN SERPL-MCNC: 14 MG/DL (ref 5–25)
CALCIUM ALBUM COR SERPL-MCNC: 9.4 MG/DL (ref 8.3–10.1)
CALCIUM SERPL-MCNC: 8.6 MG/DL (ref 8.4–10.2)
CHLORIDE SERPL-SCNC: 97 MMOL/L (ref 96–108)
CO2 SERPL-SCNC: 30 MMOL/L (ref 21–32)
CREAT SERPL-MCNC: 1.04 MG/DL (ref 0.6–1.3)
GFR SERPL CREATININE-BSD FRML MDRD: 61 ML/MIN/1.73SQ M
GLUCOSE SERPL-MCNC: 114 MG/DL (ref 65–140)
POTASSIUM SERPL-SCNC: 3.6 MMOL/L (ref 3.5–5.3)
PROT SERPL-MCNC: 7.5 G/DL (ref 6.4–8.4)
SODIUM SERPL-SCNC: 133 MMOL/L (ref 135–147)

## 2024-06-17 PROCEDURE — 80053 COMPREHEN METABOLIC PANEL: CPT

## 2024-06-17 PROCEDURE — 99232 SBSQ HOSP IP/OBS MODERATE 35: CPT

## 2024-06-17 PROCEDURE — 82140 ASSAY OF AMMONIA: CPT

## 2024-06-17 RX ADMIN — DAPSONE 50 MG: 25 TABLET ORAL at 08:01

## 2024-06-17 RX ADMIN — LACTULOSE 20 G: 20 SOLUTION ORAL at 16:28

## 2024-06-17 RX ADMIN — SPIRONOLACTONE 50 MG: 25 TABLET ORAL at 08:00

## 2024-06-17 RX ADMIN — GABAPENTIN 100 MG: 100 CAPSULE ORAL at 17:23

## 2024-06-17 RX ADMIN — OXYCODONE HYDROCHLORIDE 10 MG: 10 TABLET ORAL at 07:58

## 2024-06-17 RX ADMIN — FOLIC ACID 1 MG: 1 TABLET ORAL at 08:00

## 2024-06-17 RX ADMIN — HYDROXYCHLOROQUINE SULFATE 200 MG: 200 TABLET, FILM COATED ORAL at 08:06

## 2024-06-17 RX ADMIN — OXYCODONE HYDROCHLORIDE 10 MG: 10 TABLET ORAL at 21:41

## 2024-06-17 RX ADMIN — GABAPENTIN 100 MG: 100 CAPSULE ORAL at 08:00

## 2024-06-17 RX ADMIN — TORSEMIDE 20 MG: 20 TABLET ORAL at 16:28

## 2024-06-17 RX ADMIN — HYDROCORTISONE: 1 CREAM TOPICAL at 17:23

## 2024-06-17 RX ADMIN — SERTRALINE HYDROCHLORIDE 100 MG: 100 TABLET ORAL at 08:00

## 2024-06-17 RX ADMIN — RIFAXIMIN 550 MG: 550 TABLET ORAL at 17:23

## 2024-06-17 RX ADMIN — TORSEMIDE 20 MG: 20 TABLET ORAL at 05:58

## 2024-06-17 RX ADMIN — POLYETHYLENE GLYCOL 3350 17 G: 17 POWDER, FOR SOLUTION ORAL at 08:00

## 2024-06-17 RX ADMIN — LACTULOSE 20 G: 20 SOLUTION ORAL at 21:41

## 2024-06-17 RX ADMIN — LACTULOSE 20 G: 20 SOLUTION ORAL at 08:00

## 2024-06-17 RX ADMIN — HYDROXYCHLOROQUINE SULFATE 200 MG: 200 TABLET, FILM COATED ORAL at 16:28

## 2024-06-17 RX ADMIN — RIFAXIMIN 550 MG: 550 TABLET ORAL at 08:00

## 2024-06-17 RX ADMIN — AMITRIPTYLINE HYDROCHLORIDE 75 MG: 25 TABLET, FILM COATED ORAL at 21:41

## 2024-06-17 RX ADMIN — OXYCODONE HYDROCHLORIDE 10 MG: 10 TABLET ORAL at 14:16

## 2024-06-17 RX ADMIN — HYDROCORTISONE: 1 CREAM TOPICAL at 08:06

## 2024-06-17 NOTE — PROGRESS NOTES
"[facility]  Progress Note  Name: Bailey Olsen I  MRN: [id number]  Unit/Bed#: -01 I Date of Admission: 6/11/2024   Date of Service: 6/17/2024 I Hospital Day: 6    Assessment & Plan   * Hepatic encephalopathy (HCC)  Assessment & Plan  Presents to ED with increasing weakness and shakes with slight confusion for the last 3 to 4 days  Has history of known hepatic cirrhosis, states she was off lactulose for a week  Reviewed recent discharge diagnosis -was instructed to continue taking lactulose 10g TID  CT head revealing \" no acute intercranial abnormality\"  Ammonia 159  LFTs  -AST mildly elevated, bilirubin mildly elevated  INR 1.7 and down trending   Aspiration precautions  Avoid any sedatives or hepatotoxic medications   4 BM on 6/15, but ammonia still elevated. Decrease lactulose from every 2 hour to 30mg every 4 hours until 4-5 BM occur in 24 hours  Intake and output with BM counts to assist with lactulose titration  No ascites noted on CT abdomen pelvis but noted to have large stool burden.  Patient also is on chronic narcotic regimen which probably makes her more constipated than usual.    Continue Xifaxan as per home regimen  Had 4 BM On 6/16, patient's lactulose changed to 20 g tid with goal BM still 4-5 per day. Mental status at baseline.     Fall  Assessment & Plan  Patient experienced a fall in the ED  Still in the bathroom hitting head off sink  Repeat head CT no acute abnormality  PT/OT consult - I (Maximum Resource Intensity)   Pending insurance auth    Hyponatremia  Assessment & Plan  Sodium studies reviewed  With low urine osm, start FR   Continue home diuretic regimen     Chronic diastolic (congestive) heart failure (HCC)  Assessment & Plan  Wt Readings from Last 3 Encounters:   06/17/24 117 kg (258 lb 9.6 oz)   06/02/24 119 kg (263 lb 0.1 oz)     Recently discharged with weight 263 pounds  Echo 5/28/2024 -EF 65 to 69% with normal systolic function, normal wall motion " and normal diastolic function, no significant valvular abnormality  On torsemide 20 mg twice daily, spironolactone 50 mg daily  Continue diuretics  Not in acute exacerbation  Continue outpatient follow up     Sarcoidosis  Assessment & Plan  History of sarcoidosis.cont dapsone    Pancytopenia (HCC)  Assessment & Plan  Chronic secondary to cirrhosis   Stable at baseline     Class 3 severe obesity due to excess calories in adult (HCC)  Assessment & Plan  Recommend diet and lifestyle management    Other cirrhosis of liver (HCC)  Assessment & Plan  Chronically maintained on spironolactone 50 mg daily, torsemide 20 mg twice daily patient is vaccinated for 50 mg twice daily and lactulose 20 g as needed daily  Presenting with hepatic encephalopathy  CT abdomen pelvis -cirrhotic liver with portal hypertension and splenomegaly, no acute findings  With distended abdomen secondary to constipation.  No ascites noted           VTE Pharmacologic Prophylaxis: VTE Score: 4 Moderate Risk (Score 3-4) - Pharmacological DVT Prophylaxis Contraindicated. Sequential Compression Devices Ordered.    Mobility:   Basic Mobility Inpatient Raw Score: 17  JH-HLM Goal: 5: Stand one or more mins  JH-HLM Achieved: 2: Bed activities/Dependent transfer  JH-HLM Goal NOT achieved. Continue with multidisciplinary rounding and encourage appropriate mobility to improve upon JH-HLM goals.    Patient Centered Rounds: I performed bedside rounds with nursing staff today.   Discussions with Specialists or Other Care Team Provider: nursing, case management    Education and Discussions with Family / Patient: Attempted to update  () via phone. Left voicemail.     Total Time Spent on Date of Encounter in care of patient:  mins. This time was spent on one or more of the following: performing physical exam; counseling and coordination of care; obtaining or reviewing history; documenting in the medical record; reviewing/ordering tests, medications  or procedures; communicating with other healthcare professionals and discussing with patient's family/caregivers.    Current Length of Stay: 6 day(s)  Current Patient Status: Inpatient   Certification Statement: The patient will continue to require additional inpatient hospital stay due to monitoring BM, pending insurance auth  Discharge Plan: Anticipate discharge in 24-48 hrs to rehab facility.    Code Status: Level 1 - Full Code    Subjective:   Seen and examined today. States she is feeling better today. She said that she had around 4 BM yesterday. No chest pain, SOB, fever, chills, nausea, vomiting, abdominal pain. Said she feels that she has to have a BM soon.     Objective:     Vitals:   Temp (24hrs), Av.7 °F (36.5 °C), Min:97.7 °F (36.5 °C), Max:97.7 °F (36.5 °C)    Temp:  [97.7 °F (36.5 °C)] 97.7 °F (36.5 °C)  HR:  [89] 89  Resp:  [18] 18  BP: (110)/(59) 110/59  SpO2:  [92 %-95 %] 95 %  Body mass index is 43.03 kg/m².     Input and Output Summary (last 24 hours):     Intake/Output Summary (Last 24 hours) at 2024 1219  Last data filed at 2024 1158  Gross per 24 hour   Intake 1980 ml   Output 2500 ml   Net -520 ml       Physical Exam:   Physical Exam  Vitals reviewed.   Constitutional:       General: She is not in acute distress.     Appearance: She is obese. She is ill-appearing (chronically). She is not toxic-appearing.   HENT:      Head: Normocephalic and atraumatic.      Mouth/Throat:      Mouth: Mucous membranes are moist.   Cardiovascular:      Rate and Rhythm: Normal rate and regular rhythm.      Heart sounds: No murmur heard.  Pulmonary:      Effort: No respiratory distress.      Breath sounds: No stridor. No wheezing, rhonchi or rales.   Abdominal:      General: Bowel sounds are normal. There is no distension.      Palpations: Abdomen is soft. There is no mass.      Tenderness: There is no abdominal tenderness.   Musculoskeletal:      Right lower leg: No edema.      Left lower leg: No  edema.   Skin:     General: Skin is warm and dry.      Findings: Bruising (left forehead) present.   Neurological:      Mental Status: She is alert and oriented to person, place, and time.   Psychiatric:         Mood and Affect: Mood normal.         Behavior: Behavior normal.          Additional Data:     Labs:  Results from last 7 days   Lab Units 06/15/24  0546 06/12/24  0514 06/11/24  1158   WBC Thousand/uL 4.64   < > 2.58*   HEMOGLOBIN g/dL 12.3   < > 12.3   HEMATOCRIT % 38.0   < > 37.7   PLATELETS Thousands/uL 75*   < > 108*   SEGS PCT %  --   --  58   LYMPHO PCT %  --   --  30   MONO PCT %  --   --  7   EOS PCT %  --   --  3    < > = values in this interval not displayed.     Results from last 7 days   Lab Units 06/17/24  0600   SODIUM mmol/L 133*   POTASSIUM mmol/L 3.6   CHLORIDE mmol/L 97   CO2 mmol/L 30   BUN mg/dL 14   CREATININE mg/dL 1.04   ANION GAP mmol/L 6   CALCIUM mg/dL 8.6   ALBUMIN g/dL 3.0*   TOTAL BILIRUBIN mg/dL 1.42*   ALK PHOS U/L 215*   ALT U/L 29   AST U/L 60*   GLUCOSE RANDOM mg/dL 114     Results from last 7 days   Lab Units 06/15/24  0546   INR  1.31*             Results from last 7 days   Lab Units 06/11/24  1158   LACTIC ACID mmol/L 1.8       Lines/Drains:  Invasive Devices       Peripheral Intravenous Line  Duration             Peripheral IV 06/15/24 Proximal;Right;Ventral (anterior) Forearm 2 days                          Imaging: Reviewed radiology reports from this admission including: ultrasound(s)    Recent Cultures (last 7 days):   Results from last 7 days   Lab Units 06/11/24  1158   BLOOD CULTURE  No Growth After 5 Days.  No Growth After 5 Days.       Last 24 Hours Medication List:   Current Facility-Administered Medications   Medication Dose Route Frequency Provider Last Rate    acetaminophen  650 mg Oral Q6H PRN Laura Ariza PA-C      amitriptyline  75 mg Oral HS Laura Ariza PA-C      dapsone  50 mg Oral Daily Laura Ariza PA-C      folic acid  1 mg Oral Daily Laura  TERI Ariza      gabapentin  100 mg Oral BID Laura Ariza PA-C      hydrocortisone   Topical BID Laura Ariza PA-C      hydroxychloroquine  200 mg Oral BID With Meals Laura Ariza PA-C      lactulose  20 g Oral TID Silva Giron PA-C      ondansetron  4 mg Intravenous Q6H PRN Laura Ariza PA-C      oxyCODONE  10 mg Oral Q6H PRN Laura Ariza PA-C      polyethylene glycol  17 g Oral Daily Idalia Stuart MD      rifaximin  550 mg Oral BID Laura Ariza PA-C      sertraline  100 mg Oral Daily Laura Ariza PA-C      spironolactone  50 mg Oral Daily Laura Ariza PA-C      torsemide  20 mg Oral BID AC Laura Ariza PA-C          Today, Patient Was Seen By: Kristyn Rodriguez PA-C    **Please Note: This note may have been constructed using a voice recognition system.**

## 2024-06-17 NOTE — ASSESSMENT & PLAN NOTE
Wt Readings from Last 3 Encounters:   06/17/24 117 kg (258 lb 9.6 oz)   06/02/24 119 kg (263 lb 0.1 oz)     Recently discharged with weight 263 pounds  Echo 5/28/2024 -EF 65 to 69% with normal systolic function, normal wall motion and normal diastolic function, no significant valvular abnormality  On torsemide 20 mg twice daily, spironolactone 50 mg daily  Continue diuretics  Not in acute exacerbation  Continue outpatient follow up

## 2024-06-17 NOTE — ASSESSMENT & PLAN NOTE
"Presents to ED with increasing weakness and shakes with slight confusion for the last 3 to 4 days  Has history of known hepatic cirrhosis, states she was off lactulose for a week  Reviewed recent discharge diagnosis -was instructed to continue taking lactulose 10g TID  CT head revealing \" no acute intercranial abnormality\"  Ammonia 159  LFTs  -AST mildly elevated, bilirubin mildly elevated  INR 1.7 and down trending   Aspiration precautions  Avoid any sedatives or hepatotoxic medications   4 BM on 6/15, but ammonia still elevated. Decrease lactulose from every 2 hour to 30mg every 4 hours until 4-5 BM occur in 24 hours  Intake and output with BM counts to assist with lactulose titration  No ascites noted on CT abdomen pelvis but noted to have large stool burden.  Patient also is on chronic narcotic regimen which probably makes her more constipated than usual.    Continue Xifaxan as per home regimen  Had 4 BM On 6/16, patient's lactulose changed to 20 g tid with goal BM still 4-5 per day. Mental status at baseline.   "

## 2024-06-17 NOTE — PLAN OF CARE
Problem: PAIN - ADULT  Goal: Verbalizes/displays adequate comfort level or baseline comfort level  Description: Interventions:  - Encourage patient to monitor pain and request assistance  - Assess pain using appropriate pain scale  - Administer analgesics based on type and severity of pain and evaluate response  - Implement non-pharmacological measures as appropriate and evaluate response  - Consider cultural and social influences on pain and pain management  - Notify physician/advanced practitioner if interventions unsuccessful or patient reports new pain  Outcome: Progressing     Problem: INFECTION - ADULT  Goal: Absence or prevention of progression during hospitalization  Description: INTERVENTIONS:  - Assess and monitor for signs and symptoms of infection  - Monitor lab/diagnostic results  - Monitor all insertion sites, i.e. indwelling lines, tubes, and drains  - Monitor endotracheal if appropriate and nasal secretions for changes in amount and color  - [place] appropriate cooling/warming therapies per order  - Administer medications as ordered  - Instruct and encourage patient and family to use good hand hygiene technique  - Identify and instruct in appropriate isolation precautions for identified infection/condition  Outcome: Progressing  Goal: Absence of fever/infection during neutropenic period  Description: INTERVENTIONS:  - Monitor WBC    Outcome: Progressing     Problem: SAFETY ADULT  Goal: Patient will remain free of falls  Description: INTERVENTIONS:  - Educate patient/family on patient safety including physical limitations  - Instruct patient to call for assistance with activity   - Consult OT/PT to assist with strengthening/mobility   - Keep Call bell within reach  - Keep bed low and locked with side rails adjusted as appropriate  - Keep care items and personal belongings within reach  - Initiate and maintain comfort rounds  - Make Fall Risk Sign visible to staff  - Offer Toileting every 2 Hours,  in advance of need  - Initiate/Maintain   alarm  - Obtain necessary fall risk management equipment:     - Apply yellow socks and bracelet for high fall risk patients  - Consider moving patient to room near nurses station  Outcome: Progressing  Goal: Maintain or return to baseline ADL function  Description: INTERVENTIONS:  -  Assess patient's ability to carry out ADLs; assess patient's baseline for ADL function and identify physical deficits which impact ability to perform ADLs (bathing, care of mouth/teeth, toileting, grooming, dressing, etc.)  - Assess/evaluate cause of self-care deficits   - Assess range of motion  - Assess patient's mobility; develop plan if impaired  - Assess patient's need for assistive devices and provide as appropriate  - Encourage maximum independence but intervene and supervise when necessary  - Involve family in performance of ADLs  - Assess for home care needs following discharge   - Consider OT consult to assist with ADL evaluation and planning for discharge  - Provide patient education as appropriate  Outcome: Progressing  Goal: Maintains/Returns to pre admission functional level  Description: INTERVENTIONS:  - Perform AM-PAC 6 Click Basic Mobility/ Daily Activity assessment daily.  - Set and communicate daily mobility goal to care team and patient/family/caregiver.   - Collaborate with rehabilitation services on mobility goals if consulted  - Perform Range of Motion 3 times a day.  - Reposition patient every 2 hours.  - Dangle patient 3 times a day  - Stand patient 3 times a day  - Ambulate patient 3 times a day  - Out of bed to chair 3 times a day   - Out of bed for meals 3 times a day  - Out of bed for toileting  - Record patient progress and toleration of activity level   Outcome: Progressing     Problem: DISCHARGE PLANNING  Goal: Discharge to home or other facility with appropriate resources  Description: INTERVENTIONS:  - Identify barriers to discharge w/patient and caregiver  -  Arrange for needed discharge resources and transportation as appropriate  - Identify discharge learning needs (meds, wound care, etc.)  - Arrange for interpretive services to assist at discharge as needed  - Refer to Case Management Department for coordinating discharge planning if the patient needs post-hospital services based on physician/advanced practitioner order or complex needs related to functional status, cognitive ability, or social support system  Outcome: Progressing     Problem: Knowledge Deficit  Goal: Patient/family/caregiver demonstrates understanding of disease process, treatment plan, medications, and discharge instructions  Description: Complete learning assessment and assess knowledge base.  Interventions:  - Provide teaching at level of understanding  - Provide teaching via preferred learning methods  Outcome: Progressing

## 2024-06-17 NOTE — PLAN OF CARE
Problem: PAIN - ADULT  Goal: Verbalizes/displays adequate comfort level or baseline comfort level  Description: Interventions:  - Encourage patient to monitor pain and request assistance  - Assess pain using appropriate pain scale  - Administer analgesics based on type and severity of pain and evaluate response  - Implement non-pharmacological measures as appropriate and evaluate response  - Consider cultural and social influences on pain and pain management  - Notify physician/advanced practitioner if interventions unsuccessful or patient reports new pain  Outcome: Progressing     Problem: INFECTION - ADULT  Goal: Absence or prevention of progression during hospitalization  Description: INTERVENTIONS:  - Assess and monitor for signs and symptoms of infection  - Monitor lab/diagnostic results  - Monitor all insertion sites, i.e. indwelling lines, tubes, and drains  - Monitor endotracheal if appropriate and nasal secretions for changes in amount and color  - [place] appropriate cooling/warming therapies per order  - Administer medications as ordered  - Instruct and encourage patient and family to use good hand hygiene technique  - Identify and instruct in appropriate isolation precautions for identified infection/condition  Outcome: Progressing  Goal: Absence of fever/infection during neutropenic period  Description: INTERVENTIONS:  - Monitor WBC    Outcome: Progressing     Problem: SAFETY ADULT  Goal: Patient will remain free of falls  Description: INTERVENTIONS:  - Educate patient/family on patient safety including physical limitations  - Instruct patient to call for assistance with activity   - Consult OT/PT to assist with strengthening/mobility   - Keep Call bell within reach  - Keep bed low and locked with side rails adjusted as appropriate  - Keep care items and personal belongings within reach  - Initiate and maintain comfort rounds  - Make Fall Risk Sign visible to staff  - Offer Toileting every 2 Hours,  in advance of need  - Initiate/Maintain alarm  - Obtain necessary fall risk management equipment  - Apply yellow socks and bracelet for high fall risk patients  - Consider moving patient to room near nurses station  Outcome: Progressing  Goal: Maintain or return to baseline ADL function  Description: INTERVENTIONS:  -  Assess patient's ability to carry out ADLs; assess patient's baseline for ADL function and identify physical deficits which impact ability to perform ADLs (bathing, care of mouth/teeth, toileting, grooming, dressing, etc.)  - Assess/evaluate cause of self-care deficits   - Assess range of motion  - Assess patient's mobility; develop plan if impaired  - Assess patient's need for assistive devices and provide as appropriate  - Encourage maximum independence but intervene and supervise when necessary  - Involve family in performance of ADLs  - Assess for home care needs following discharge   - Consider OT consult to assist with ADL evaluation and planning for discharge  - Provide patient education as appropriate  Outcome: Progressing  Goal: Maintains/Returns to pre admission functional level  Description: INTERVENTIONS:  - Perform AM-PAC 6 Click Basic Mobility/ Daily Activity assessment daily.  - Set and communicate daily mobility goal to care team and patient/family/caregiver.   - Collaborate with rehabilitation services on mobility goals if consulted  - Perform Range of Motion 3 times a day.  - Reposition patient every 2 hours.  - Dangle patient 3 times a day  - Stand patient 3 times a day  - Ambulate patient 3 times a day  - Out of bed to chair 3 times a day   - Out of bed for meals 3 times a day  - Out of bed for toileting  - Record patient progress and toleration of activity level   Outcome: Progressing     Problem: DISCHARGE PLANNING  Goal: Discharge to home or other facility with appropriate resources  Description: INTERVENTIONS:  - Identify barriers to discharge w/patient and caregiver  - Arrange  for needed discharge resources and transportation as appropriate  - Identify discharge learning needs (meds, wound care, etc.)  - Arrange for interpretive services to assist at discharge as needed  - Refer to Case Management Department for coordinating discharge planning if the patient needs post-hospital services based on physician/advanced practitioner order or complex needs related to functional status, cognitive ability, or social support system  Outcome: Progressing     Problem: Knowledge Deficit  Goal: Patient/family/caregiver demonstrates understanding of disease process, treatment plan, medications, and discharge instructions  Description: Complete learning assessment and assess knowledge base.  Interventions:  - Provide teaching at level of understanding  - Provide teaching via preferred learning methods  Outcome: Progressing

## 2024-06-17 NOTE — CASE MANAGEMENT
Auth still pended according to [facility] portal. CM Notified. DCS  notified.     06/17 @ 340pm - Called insurance (P#: [phone]) to check status of submitted authorization request. Spoke with Rhonda MARTINEZ who stated case is still pended and she is able to view request in their system. Confirmed requested auth information with rep. Rep stated she entered a request for case to be expedited. Stated any updates will be called in. Call Ref#: [id number]. Pending Reference #: [id number]. MARY Notified: Olga Benitez. DCS  notified.

## 2024-06-17 NOTE — CASE MANAGEMENT
Case Management Discharge Planning Note    Patient name Bailey Olsen  Location /-01 MRN [id number]  : 1970 Date 2024       Current Admission Date: 2024  Current Admission Diagnosis:Hepatic encephalopathy (HCC)   Patient Active Problem List    Diagnosis Date Noted Date Diagnosed    Hyponatremia 06/15/2024     Hepatic encephalopathy (HCC) 2024     Fall 2024     Other cirrhosis of liver (HCC) 2024     History of malignant carcinoid tumor of small intestine 2024     Class 3 severe obesity due to excess calories in adult (HCC) 2024     Primary hypertension 2024     Anxiety 2024     Hypokalemia 2024     Fibromyalgia 2024     Iron deficiency anemia 2024     Pancytopenia (HCC) 2024     Sarcoidosis 2024     Chronic diastolic (congestive) heart failure (HCC) 2024     Bilateral pulmonary infiltrates 2024       LOS (days): 6  Geometric Mean LOS (GMLOS) (days):   Days to GMLOS:     OBJECTIVE:  Risk of Unplanned Readmission Score: 18.23         Current admission status: Inpatient   Preferred Pharmacy:   [facility] [address] [place] [address] [address] [facility]  [address] [facility] [address]  Phone: [phone] Fax: [phone]    Primary Care Provider: Nayely Brown DO    Primary Insurance: [facility]  Secondary Insurance:     DISCHARGE DETAILS:        CM received messaged in [facility] from [facility] requesting updated therapy notes as patients auth pending.    CM messaged therapy requesting patient be seen in AM. CM to uploaded notes to [facility].

## 2024-06-17 NOTE — UTILIZATION REVIEW
Continued Stay Review for 6/15/24, 6/16/24 and 6/17/24    Date: 6/17/24                          Current Patient Class: IP  Current Level of Care: MS    HPI:53 y.o. female initially admitted on 6/11/24 - DX: Hepatic encephalopathy /  Hyponatremia / Chronic diastolic (congestive) heart failure     Assessment/Plan:   6/15/24: Patient states that she is still tired and confused. No BM in 24 hours despite increased dose of lactulose. Start 30mg every 2 hours until 3-4 BM occur in 24 hours. INR 1.31 and down trending; Na 131; Albumin 3.4; Ammonia 89  Plan: cont lactulose; Recd Mg Sulf iv x1; pain / nausea control (see below); monitor labs        6/16/24:   4 BM on 6/15, but ammonia still elevated. Decrease lactulose from every 2 hour to 30mg every 4 hours until 4-5 BM occur in 24 hours. Sodium studies reviewed. With low urine osm, start FR.  Patient states that she is anxious about leaving because she wants to make sure that she can go to the acute rehab. I/O Net 1580; Oriented to self, place, month, not year ; Ammonia 104   Plan: cont lactulose; pain / nausea control (see below); monitor labs; CM following for placement         6/17/24: States she is feeling better today. patient's lactulose changed to 20 g tid with goal BM still 4-5 per day. Mental status at baseline.  Ammonia 57  Plan: cont lactulose; pain / nausea control (see below); monitor labs; CM following for placement - pending insurance auth      Vital Signs (last 3 days)       Date/Time Temp Pulse Resp BP MAP (mmHg) SpO2 O2 Device Patient Position - Orthostatic VS Iris Coma Scale Score Pain    06/17/24 0900 -- -- -- -- -- -- None (Room air) -- 15 No Pain    06/17/24 0758 -- -- -- -- -- -- -- -- -- 8    06/16/24 2142 -- -- -- -- -- -- -- -- -- 7    06/16/24 2031 -- -- -- -- -- 95 % None (Room air) -- 15 No Pain    06/16/24 1533 -- -- -- -- -- -- -- -- -- 5    06/16/24 15:16:24 97.7 °F (36.5 °C) 89 18 110/59 76 92 % None (Room air) Lying -- --    06/16/24  0830 -- -- -- -- -- -- -- -- 15 --    06/16/24 0828 -- -- -- -- -- -- -- -- -- 5 06/16/24 06:14:06 97.9 °F (36.6 °C) 75 18 117/68 84 93 % -- Lying -- --    06/16/24 0239 -- -- -- -- -- -- -- -- -- 6    06/15/24 22:02:48 97.9 °F (36.6 °C) 87 18 135/79 98 92 % None (Room air) Lying -- No Pain    06/15/24 19:28:11 97.9 °F (36.6 °C) 85 18 146/78 101 96 % None (Room air) Lying 15 6    06/15/24 1757 -- -- -- -- -- -- -- -- -- 5    06/15/24 14:17:39 98.2 °F (36.8 °C) 87 18 116/63 81 92 % -- -- -- --    06/15/24 1253 -- -- -- -- -- -- -- -- -- 6    06/15/24 0815 -- -- -- -- -- -- None (Room air) -- 15 --    06/15/24 0814 -- -- -- -- -- -- -- -- -- 6    06/15/24 07:04:13 97.5 °F (36.4 °C) 86 18 145/88 107 93 % -- -- -- --    06/15/24 0527 -- -- -- -- -- -- -- -- -- 8 06/14/24 2125 -- -- -- -- -- -- -- -- -- 6 06/14/24 21:24:49 97.9 °F (36.6 °C) 86 -- 113/70 84 91 % -- -- -- --    06/14/24 2031 -- -- -- -- -- 93 % None (Room air) -- 15 4    06/14/24 1420 -- -- -- -- -- -- -- -- -- 8    06/14/24 14:02:52 97.9 °F (36.6 °C) -- 18 114/69 84 -- -- -- -- --    06/14/24 0845 -- -- -- -- -- -- None (Room air) -- 15 No Pain    06/14/24 06:12:55 -- 87 -- 124/70 88 89 % -- -- -- --    06/14/24 0353 -- -- -- -- -- -- -- -- -- 5          Weight (last 2 days)       Date/Time Weight    06/17/24 0500 117 (258.6)    06/16/24 0912 118 (260.2)              Pertinent Labs/Diagnostic Results:   Radiology:  US abdomen limited   Final Interpretation by Alli Hernandez MD (06/12 1550)      No ascites.         Workstation performed: [id number]         CT head without contrast   Final Interpretation by Dirk Andrade MD (06/11 7170)      No acute intracranial abnormality.                  Workstation performed: [id number]         CT head without contrast   Final Interpretation by Bright Jones MD (06/11 1253)      No acute intracranial abnormality.                  Workstation performed: [id number]         CT abdomen  pelvis with contrast   Final Interpretation by Bright Jones MD (06/11 1315)      No acute findings in the abdomen or pelvis.      Cirrhotic liver with portal hypertension and splenomegaly.         Workstation performed: [id number]           Cardiology:  ECG 12 lead   Final Result by Gaston Estes MD (06/12 1334)   Normal sinus rhythm   Minimal voltage criteria for LVH, may be normal variant ( R in aVL )   Prolonged QT   Abnormal ECG   When compared with ECG of 26-MAY-2024 14:37,   Nonspecific T wave abnormality, improved in Lateral leads   Confirmed by Gaston Estes ([id number]) on 6/12/2024 1:34:30 PM        Results from last 7 days   Lab Units 06/11/24  1158   SARS-COV-2  Negative     Results from last 7 days   Lab Units 06/15/24  0546 06/14/24  0401 06/12/24  0514 06/11/24  1158   WBC Thousand/uL 4.64 4.69 2.90* 2.58*   HEMOGLOBIN g/dL 12.3 12.9 11.9 12.3   HEMATOCRIT % 38.0 40.0 36.2 37.7   PLATELETS Thousands/uL 75* 82* 87* 108*   TOTAL NEUT ABS Thousands/µL  --   --   --  1.49*        Results from last 7 days   Lab Units 06/17/24  0600 06/16/24  0520 06/15/24  0546 06/14/24  0401 06/13/24  0613 06/12/24  0514 06/11/24  1158   SODIUM mmol/L 133* 131* 131* 133* 136 137 136   POTASSIUM mmol/L 3.6 3.7 3.7 3.9 4.3 3.9 4.0   CHLORIDE mmol/L 97 95* 93* 95* 98 98 98   CO2 mmol/L 30 31 33* 31 34* 33* 32   ANION GAP mmol/L 6 5 5 7 4 6 6   BUN mg/dL 14 15 13 15 15 17 23   CREATININE mg/dL 1.04 1.15 1.18 1.16 1.14 1.27 1.24   EGFR ml/min/1.73sq m 61 54 52 53 54 47 49   CALCIUM mg/dL 8.6 8.5 9.0 9.1 8.9 8.8 9.5   MAGNESIUM mg/dL  --  2.1 1.8*  --   --  1.9 2.1     Results from last 7 days   Lab Units 06/17/24  0600 06/16/24  1452 06/16/24  0520 06/15/24  0546 06/14/24  0401 06/12/24  1045 06/12/24  0514   AST U/L 60*  --  65* 77* 86*  --  83*   ALT U/L 29  --  35 42 45  --  45   ALK PHOS U/L 215*  --  223* 255* 240*  --  214*   TOTAL PROTEIN g/dL 7.5  --  7.7 8.3 8.3  --  7.7   ALBUMIN g/dL 3.0*  --  3.1* 3.4*  3.5  --  3.3*   TOTAL BILIRUBIN mg/dL 1.42*  --  1.57* 1.82* 1.95*  --  1.21*   AMMONIA umol/L 57 63 104* 89* 57   < >  --     < > = values in this interval not displayed.        Results from last 7 days   Lab Units 06/17/24  0600 06/16/24  0520 06/15/24  0546 06/14/24  0401 06/13/24  0613 06/12/24  0514 06/11/24  1158   GLUCOSE RANDOM mg/dL 114 137 170* 133 121 125 118     Results from last 7 days   Lab Units 06/15/24  0546   OSMOLALITY, SERUM mmol/        Results from last 7 days   Lab Units 06/11/24  1158   HS TNI 0HR ng/L 5        Results from last 7 days   Lab Units 06/15/24  0546 06/11/24  1158   PROTIME seconds 16.6* 16.2*   INR  1.31* 1.27*   PTT seconds  --  27        Results from last 7 days   Lab Units 06/11/24  1158   LACTIC ACID mmol/L 1.8        Results from last 7 days   Lab Units 06/11/24  1158   BNP pg/mL 9        Results from last 7 days   Lab Units 06/11/24  1158   LIPASE u/L 21        Results from last 7 days   Lab Units 06/15/24  0945 06/15/24  0546   OSMOLALITY, SERUM mmol/KG  --  293   OSMO UR mmol/*  --      Results from last 7 days   Lab Units 06/15/24  0945   SODIUM UR  32     Results from last 7 days   Lab Units 06/11/24  1158   INFLUENZA A PCR  Negative   INFLUENZA B PCR  Negative   RSV PCR  Negative        Results from last 7 days   Lab Units 06/11/24  1158   BLOOD CULTURE  No Growth After 5 Days.  No Growth After 5 Days.          Medications:   Scheduled Medications:  amitriptyline, 75 mg, Oral, HS  dapsone, 50 mg, Oral, Daily  folic acid, 1 mg, Oral, Daily  gabapentin, 100 mg, Oral, BID  hydrocortisone, , Topical, BID  hydroxychloroquine, 200 mg, Oral, BID With Meals  lactulose, 20 g, Oral, TID  polyethylene glycol, 17 g, Oral, Daily  rifaximin, 550 mg, Oral, BID  sertraline, 100 mg, Oral, Daily  spironolactone, 50 mg, Oral, Daily  torsemide, 20 mg, Oral, BID AC    magnesium sulfate 2 g/50 mL IVPB (premix) 2 g  Dose: 2 g  Freq: Once Route: IV  Last Dose: 2 g (06/15/24  0813)  Start: 06/15/24 0745 End: 06/15/24 1013      Continuous IV Infusions: None       PRN Meds:  acetaminophen, 650 mg, Oral, Q6H PRN  (6/15 recd x2)    ondansetron, 4 mg, Intravenous, Q6H PRN (6/15 recd x1)  (6/16 recd x1)   oxyCODONE, 10 mg, Oral, Q6H PRN   (6/15 recd x3)  (6/16 recd x4)   (6/17 recd x1 so far today)         Discharge Plan: TBD - DISCHARGE DETAILS: Additional Comments: CM met with pt at bedside and informed her that she was accepted to [facility] and at this time we are just waiting for insurance auth to come back.     Network Utilization Review Department  ATTENTION: Please call with any questions or concerns to [phone] and carefully listen to the prompts so that you are directed to the right person. All voicemails are confidential.   For Discharge needs, contact Care Management DC Support Team at [phone] opt. [phone]  Send all requests for admission clinical reviews, approved or denied determinations and any other requests to dedicated fax number below belonging to the campus where the patient is receiving treatment. List of dedicated fax numbers for the Facilities:  FACILITY NAME UR FAX NUMBER   ADMISSION DENIALS (Administrative/Medical Necessity) [phone]   DISCHARGE SUPPORT TEAM (NETWORK) [phone]   PARENT CHILD HEALTH (Maternity/NICU/Pediatrics) [phone]   [facility] [phone]   [facility] [phone]   [facility] [phone]   [facility] [phone]   [facility] [phone]   [facility] [phone]   [facility] [phone]   [facility] [phone]   [facility] [phone]   [facility] [phone]   [facility] [phone]   [facility]  [facility] [phone]

## 2024-06-17 NOTE — ASSESSMENT & PLAN NOTE
Patient experienced a fall in the ED  Still in the bathroom hitting head off sink  Repeat head CT no acute abnormality  PT/OT consult - I (Maximum Resource Intensity)   Pending insurance auth

## 2024-06-17 NOTE — CASE MANAGEMENT
[facility]  received notification that Acute Rehab authorization is pending >24 hours.     Authorization was initially submitted to insurance: [facility]  Via: Portal      Date: 6/14  Time: 815am   Pending ref# if applicable: [id number]     For facility: [facility]     Action Taken:  Escalation email request sent to [facility] contact requesting expedite of this auth d/t pending >24 hours.      Notified DCS Staff and Care Manager: Olga ESPINOZA of rosy.

## 2024-06-18 VITALS
RESPIRATION RATE: 18 BRPM | DIASTOLIC BLOOD PRESSURE: 56 MMHG | HEART RATE: 78 BPM | WEIGHT: 258.6 LBS | TEMPERATURE: 97.7 F | BODY MASS INDEX: 43.09 KG/M2 | HEIGHT: 65 IN | SYSTOLIC BLOOD PRESSURE: 105 MMHG | OXYGEN SATURATION: 96 %

## 2024-06-18 PROBLEM — E87.1 HYPONATREMIA: Status: RESOLVED | Noted: 2024-06-15 | Resolved: 2024-06-18

## 2024-06-18 LAB
ALBUMIN SERPL BCP-MCNC: 3.2 G/DL (ref 3.5–5)
ALP SERPL-CCNC: 222 U/L (ref 34–104)
ALT SERPL W P-5'-P-CCNC: 33 U/L (ref 7–52)
AMMONIA PLAS-SCNC: 50 UMOL/L (ref 18–72)
ANION GAP SERPL CALCULATED.3IONS-SCNC: 5 MMOL/L (ref 4–13)
AST SERPL W P-5'-P-CCNC: 66 U/L (ref 13–39)
BILIRUB SERPL-MCNC: 1.27 MG/DL (ref 0.2–1)
BUN SERPL-MCNC: 17 MG/DL (ref 5–25)
CALCIUM ALBUM COR SERPL-MCNC: 9.4 MG/DL (ref 8.3–10.1)
CALCIUM SERPL-MCNC: 8.8 MG/DL (ref 8.4–10.2)
CHLORIDE SERPL-SCNC: 96 MMOL/L (ref 96–108)
CO2 SERPL-SCNC: 36 MMOL/L (ref 21–32)
CREAT SERPL-MCNC: 1.15 MG/DL (ref 0.6–1.3)
GFR SERPL CREATININE-BSD FRML MDRD: 54 ML/MIN/1.73SQ M
GLUCOSE SERPL-MCNC: 108 MG/DL (ref 65–140)
POTASSIUM SERPL-SCNC: 3.5 MMOL/L (ref 3.5–5.3)
PROT SERPL-MCNC: 7.9 G/DL (ref 6.4–8.4)
SODIUM SERPL-SCNC: 137 MMOL/L (ref 135–147)

## 2024-06-18 PROCEDURE — 80053 COMPREHEN METABOLIC PANEL: CPT

## 2024-06-18 PROCEDURE — 99239 HOSP IP/OBS DSCHRG MGMT >30: CPT

## 2024-06-18 PROCEDURE — 97535 SELF CARE MNGMENT TRAINING: CPT

## 2024-06-18 PROCEDURE — 82140 ASSAY OF AMMONIA: CPT

## 2024-06-18 PROCEDURE — 97110 THERAPEUTIC EXERCISES: CPT

## 2024-06-18 PROCEDURE — 97116 GAIT TRAINING THERAPY: CPT

## 2024-06-18 RX ORDER — LACTULOSE 10 G/15ML
20 SOLUTION ORAL 4 TIMES DAILY
Status: DISCONTINUED | OUTPATIENT
Start: 2024-06-18 | End: 2024-06-18 | Stop reason: HOSPADM

## 2024-06-18 RX ORDER — POLYETHYLENE GLYCOL 3350 17 G/17G
17 POWDER, FOR SOLUTION ORAL DAILY
Qty: 30 EACH | Refills: 0 | Status: SHIPPED | OUTPATIENT
Start: 2024-06-19

## 2024-06-18 RX ORDER — LACTULOSE 10 G/15ML
20 SOLUTION ORAL 4 TIMES DAILY
Qty: 3600 ML | Refills: 0 | Status: SHIPPED | OUTPATIENT
Start: 2024-06-18 | End: 2024-07-18

## 2024-06-18 RX ORDER — OXYCODONE HYDROCHLORIDE 10 MG/1
10 TABLET ORAL EVERY 6 HOURS PRN
Start: 2024-06-18 | End: 2024-06-18

## 2024-06-18 RX ORDER — OXYCODONE HYDROCHLORIDE 10 MG/1
10 TABLET ORAL EVERY 6 HOURS PRN
Qty: 12 TABLET | Refills: 0 | Status: SHIPPED | OUTPATIENT
Start: 2024-06-18 | End: 2024-06-21

## 2024-06-18 RX ADMIN — LACTULOSE 20 G: 20 SOLUTION ORAL at 12:20

## 2024-06-18 RX ADMIN — POLYETHYLENE GLYCOL 3350 17 G: 17 POWDER, FOR SOLUTION ORAL at 08:30

## 2024-06-18 RX ADMIN — LACTULOSE 20 G: 20 SOLUTION ORAL at 08:30

## 2024-06-18 RX ADMIN — RIFAXIMIN 550 MG: 550 TABLET ORAL at 08:31

## 2024-06-18 RX ADMIN — DAPSONE 50 MG: 25 TABLET ORAL at 08:35

## 2024-06-18 RX ADMIN — TORSEMIDE 20 MG: 20 TABLET ORAL at 16:09

## 2024-06-18 RX ADMIN — HYDROXYCHLOROQUINE SULFATE 200 MG: 200 TABLET, FILM COATED ORAL at 16:09

## 2024-06-18 RX ADMIN — TORSEMIDE 20 MG: 20 TABLET ORAL at 06:06

## 2024-06-18 RX ADMIN — HYDROCORTISONE: 1 CREAM TOPICAL at 08:30

## 2024-06-18 RX ADMIN — SERTRALINE HYDROCHLORIDE 100 MG: 100 TABLET ORAL at 08:31

## 2024-06-18 RX ADMIN — HYDROXYCHLOROQUINE SULFATE 200 MG: 200 TABLET, FILM COATED ORAL at 07:25

## 2024-06-18 RX ADMIN — GABAPENTIN 100 MG: 100 CAPSULE ORAL at 08:31

## 2024-06-18 RX ADMIN — OXYCODONE HYDROCHLORIDE 10 MG: 10 TABLET ORAL at 06:10

## 2024-06-18 RX ADMIN — FOLIC ACID 1 MG: 1 TABLET ORAL at 08:31

## 2024-06-18 RX ADMIN — SPIRONOLACTONE 50 MG: 25 TABLET ORAL at 08:31

## 2024-06-18 RX ADMIN — OXYCODONE HYDROCHLORIDE 10 MG: 10 TABLET ORAL at 12:22

## 2024-06-18 NOTE — ASSESSMENT & PLAN NOTE
Chronically maintained on spironolactone 50 mg daily, torsemide 20 mg twice daily patient is vaccinated for 50 mg twice daily and lactulose 20 g as needed daily  Presenting with hepatic encephalopathy  CT abdomen pelvis -cirrhotic liver with portal hypertension and splenomegaly, no acute findings  With distended abdomen secondary to constipation.  No ascites noted  Increase lactulose to 20 g qid on discharge with goal of 4-5 BM a day, instructed to decrease if having too many bowel movements, verbalized understanding.

## 2024-06-18 NOTE — ASSESSMENT & PLAN NOTE
Sodium studies reviewed  With low urine osm, start FR   Continue home diuretic regimen   Normalized. Repeat CMP in 1 week with PCP.

## 2024-06-18 NOTE — PLAN OF CARE
Problem: OCCUPATIONAL THERAPY ADULT  Goal: Performs self-care activities at highest level of function for planned discharge setting.  See evaluation for individualized goals.  Description: Treatment Interventions: ADL retraining, Functional transfer training, Endurance training, Patient/family training, UE strengthening/ROM, Compensatory technique education, Continued evaluation, Energy conservation, Activityengagement          See flowsheet documentation for full assessment, interventions and recommendations.   Outcome: Progressing  Note: Limitation: Decreased ADL status, Decreased UE ROM, Decreased UE strength, Decreased endurance, Decreased self-care trans, Decreased high-level ADLs  Prognosis: Good  Assessment: Pt completed OT tx session #2 focused on ADLs and functional mobility. Pt alert and agreeable to participate. PT/OT co-treat completed d/t significant mobility deficits and safety concerns. Pt with much improved cognition as compared to previous session. She feels that she is thinking more clearly but still does not feel herself. She continues to be limited by pain, fatigue, and decreased generalized strength. She remains to be a good candidate for acute rehabilitation as she has demonstrated G participation and made great functional progress while at the hospital.     Rehab Resource Intensity Level, OT: II (Moderate Resource Intensity)

## 2024-06-18 NOTE — DISCHARGE SUMMARY
"[facility]  Discharge- Bailey Olsen 1970, 53 y.o. female MRN: [id number]  Unit/Bed#: -01 Encounter: [id number]  Primary Care Provider: Nayely Brown DO   Date and time admitted to hospital: 6/11/2024 11:36 AM    * Hepatic encephalopathy (HCC)  Assessment & Plan  Presents to ED with increasing weakness and shakes with slight confusion for the last 3 to 4 days  Has history of known hepatic cirrhosis, states she was off lactulose for a week  Reviewed recent discharge diagnosis -was instructed to continue taking lactulose 10g TID  CT head revealing \" no acute intercranial abnormality\"  Ammonia 159  LFTs  -AST mildly elevated, bilirubin mildly elevated  INR 1.7 and down trending   Aspiration precautions  Avoid any sedatives or hepatotoxic medications   4 BM on 6/15, but ammonia still elevated. Decrease lactulose from every 2 hour to 30mg every 4 hours until 4-5 BM occur in 24 hours  Intake and output with BM counts to assist with lactulose titration  No ascites noted on CT abdomen pelvis but noted to have large stool burden.  Patient also is on chronic narcotic regimen which probably makes her more constipated than usual.    Continue Xifaxan as per home regimen  Had 4 BM On 6/16, patient's lactulose changed to 20 g tid with goal BM still 4-5 per day. Mental status at baseline.   Ammonia 50 on 6/18, only had 2 BM on 6/17, increase lactulose to 20 g qid at this time, discussed with patient to take extra dose of lactulose if not getting goal BM of 4-5 per day, explained that goal BM a day is 4-5. Also recommended decreasing dose if she is having multiple BM. Should repeat ammonia outpatient. Follow up with PCP outpatient and GI outpatient. Told her to call her PCP if she is having not enough BM and feeling constipated as well. Patient verbalized understanding.     Fall  Assessment & Plan  Patient experienced a fall in the ED  Still in the bathroom hitting head off sink  Repeat " head CT no acute abnormality  PT/OT consult - I (Maximum Resource Intensity)   Pending insurance auth for acute rehab - auth denied, peer to peer done and auth still denied.   Discussed with patient, she would like to go home with [facility]. CM assisted with discharge planning. Patient to be discharged home with [facility].     Other cirrhosis of liver (HCC)  Assessment & Plan  Chronically maintained on spironolactone 50 mg daily, torsemide 20 mg twice daily patient is vaccinated for 50 mg twice daily and lactulose 20 g as needed daily  Presenting with hepatic encephalopathy  CT abdomen pelvis -cirrhotic liver with portal hypertension and splenomegaly, no acute findings  With distended abdomen secondary to constipation.  No ascites noted  Increase lactulose to 20 g qid on discharge with goal of 4-5 BM a day, instructed to decrease if having too many bowel movements, verbalized understanding.      Chronic diastolic (congestive) heart failure (HCC)  Assessment & Plan  Wt Readings from Last 3 Encounters:   06/18/24 117 kg (258 lb 9.6 oz)   06/02/24 119 kg (263 lb 0.1 oz)     Recently discharged with weight 263 pounds  Echo 5/28/2024 -EF 65 to 69% with normal systolic function, normal wall motion and normal diastolic function, no significant valvular abnormality  On torsemide 20 mg twice daily, spironolactone 50 mg daily  Continue diuretics  Not in acute exacerbation  Continue outpatient follow up     Sarcoidosis  Assessment & Plan  History of sarcoidosis.cont dapsone    Pancytopenia (HCC)  Assessment & Plan  Chronic secondary to cirrhosis   Stable at baseline     Class 3 severe obesity due to excess calories in adult (HCC)  Assessment & Plan  Recommend diet and lifestyle management    Hyponatremia-resolved as of 6/18/2024  Assessment & Plan  Sodium studies reviewed  With low urine osm, start FR   Continue home diuretic regimen   Normalized. Repeat CMP in 1 week with PCP.       Medical Problems       Resolved Problems  Date  Reviewed: 6/18/2024            Resolved    Hyponatremia 6/18/2024     Resolved by  Kristyn Rodriguez PA-C        Discharging Physician / Practitioner: Kristyn Rodriguez PA-C  PCP: Nayely Brown DO  Admission Date:   Admission Orders (From admission, onward)       Ordered        06/11/24 1321  INPATIENT ADMISSION  Once                          Discharge Date: 06/18/24    Consultations During Hospital Stay:  Pt/ot    Procedures Performed:   none    Significant Findings / Test Results:   US abdomen limited   Final Result by Alli Hernandez MD (06/12 0448)      No ascites.         Workstation performed: [id number]         CT head without contrast   Final Result by Dirk Andrade MD (06/11 1420)      No acute intracranial abnormality.                  Workstation performed: [id number]         CT head without contrast   Final Result by Bright Jones MD (06/11 1253)      No acute intracranial abnormality.                  Workstation performed: [id number]         CT abdomen pelvis with contrast   Final Result by Bright Jones MD (06/11 1315)      No acute findings in the abdomen or pelvis.      Cirrhotic liver with portal hypertension and splenomegaly.         Workstation performed: [id number]           Ammonia: 159 > 85 > 119 > 57 > 89 > 104  > 63  > 57 > 50  Na: 133 > 131 > 131 > 133 > 137  Urine osmo: 243  Urine sodium: 32  Serum osmo: 293  BNP: 9  Lactic normal   Covid/flu/rsv negative    Incidental Findings:   none     Test Results Pending at Discharge (will require follow up):   none     Outpatient Tests Requested:  Follow up with PCP in 1 week  Follow up with GI outpatient  Repeat CBC and CMP in 1 week    Complications:  none    Reason for Admission: acute hepatic encephalopathy    Hospital Course:   Bailey Olsen is a 53 y.o. female patient who originally presented to the hospital on 6/11/2024 due to increasing weakness, shakes, confusion for 3-4 days. States that she was off  her lactulose. In the ED, CT head normal. Found to have mildly elevated LFTs, lactulose was given due to ammonia being 159. Had distended abdomen and known history of cirrhosis, ultrasound abdomen done with results above, no evidence of ascites. Lactulose dose was adjusted inpatient to titrate for 3-4 bowel movements a day initially. On CT abdomen pelvis, patient was noted to have large stool burden as well. Needed frequent monitoring of ammonia levels and lactulose was increased to 30 mg qid for 3-4 BM a day. Patient did not have BM in 24 hours despite increased dose in lactulose, started 30 mg q2h until 3-4 BM occurred in 24 hours. Patient had multiple bowel movements with this increased dosing and dosing was decreased to every 4 hours until 4-5 BM occurred in 24 hours. Patient then was changed to lactulose 20 g tid with goal BM around 4-5 per day. Had 2 BM On 6/17. Increased dose back to 20 g qid. Mentation remained at baseline and patient stating she normally has good bowel regimen at home. Instructed patient that if she does not have increased bowel movements of up to 4-5 per day, she needs to take another dose of lactulose. Also discussed that if she has too many bowel movements, she can take 1 less dose of lactulose. Verbalized understanding. Needs close I&O monitoring. Mental status improved with more frequent bowel movements and ammonia level stayed within normal range prior to discharge.     She was also noted to have a fall, pt/ot consulted for this. PT/OT recommended I (Maximum Resource Intensity) for patient. Recommendation was acute rehab. Authorization was sent which was initially denied. Peer to peer done and auth was still denied.  Discussed with patient and she would not want to go to subacute rehab and would prefer to go home with [facility]. PT/OT evaluated patient again on 6/18 as well and patient improved with function now recommending II (Moderate Resource Intensity). CM assisted with HHC and patient  "going home with [facility]. On discharge, patient should follow up with PCP in 1 week. Repeat ammonia level in 1 day, 3 days, 1 week. Repeat CBC and CMP in 1 day and 1 week. Follow up with GI in 1 week. Should return should she have any new or worsening symptoms. Verbalized understanding and agrees to the plans above.      Please see above list of diagnoses and related plan for additional information.     Condition at Discharge: fair    Discharge Day Visit / Exam:   Subjective:  Seen and examined today. States that she is feeling much better than when she came in. She is looking forward to going home. She said she feels her strength is better than before and that she is hopefully going to continue to have frequent bowel movements at home. No nausea, vomiting, abdominal pain, CP, SOB, fever, chills. She said understands the lactulose dosing and will return if she has any new or worsening symptoms.   Vitals: Blood Pressure: 129/76 (06/18/24 0729)  Pulse: 73 (06/18/24 0729)  Temperature: 97.5 °F (36.4 °C) (06/18/24 0729)  Temp Source: Temporal (06/17/24 2332)  Respirations: 18 (06/18/24 0729)  Height: 5' 5\" (165.1 cm) (06/18/24 1252)  Weight - Scale: 117 kg (258 lb 9.6 oz) (06/18/24 0536)  SpO2: 97 % (06/18/24 0729)  Exam:   Physical Exam  Vitals reviewed.   Constitutional:       General: She is not in acute distress.     Appearance: She is obese. She is not ill-appearing or toxic-appearing.   HENT:      Head: Normocephalic and atraumatic.      Mouth/Throat:      Mouth: Mucous membranes are moist.   Cardiovascular:      Rate and Rhythm: Normal rate and regular rhythm.      Heart sounds: No murmur heard.  Pulmonary:      Effort: No respiratory distress.      Breath sounds: No stridor. No wheezing, rhonchi or rales.      Comments: Saturating well on room air  Abdominal:      General: Bowel sounds are normal. There is no distension.      Palpations: Abdomen is soft. There is no mass.      Tenderness: There is no " abdominal tenderness.   Musculoskeletal:      Right lower leg: No edema.      Left lower leg: No edema.   Skin:     General: Skin is warm and dry.      Findings: Bruising (left eye) present.   Neurological:      Mental Status: She is alert and oriented to person, place, and time.      Comments: Answering all orientation questions appropriately   Psychiatric:         Mood and Affect: Mood normal.         Behavior: Behavior normal.          Discussion with Family: Attempted to update  () via phone. Left voicemail.     Discharge instructions/Information to patient and family:   See after visit summary for information provided to patient and family.      Provisions for Follow-Up Care:  See after visit summary for information related to follow-up care and any pertinent home health orders.      Mobility at time of Discharge:   Basic Mobility Inpatient Raw Score: 17  JH-HLM Goal: 5: Stand one or more mins  JH-HLM Achieved: 7: Walk 25 feet or more  HLM Goal achieved. Continue to encourage appropriate mobility.     Disposition:   Home with VNA Services (Reminder: Complete face to face encounter)    Planned Readmission: no     Discharge Statement:  I spent 53 minutes discharging the patient. This time was spent on the day of discharge. I had direct contact with the patient on the day of discharge. Greater than 50% of the total time was spent examining patient, answering all patient questions, arranging and discussing plan of care with patient as well as directly providing post-discharge instructions.  Additional time then spent on discharge activities.    Discharge Medications:  See after visit summary for reconciled discharge medications provided to patient and/or family.      **Please Note: This note may have been constructed using a voice recognition system**

## 2024-06-18 NOTE — CASE MANAGEMENT
Support Center has received DENIAL for Acute Rehab Authorization.   Insurance: [facility]   Called in by Rep: Rosario MARTINEZ#: [phone]   Denial Reason: Does not meet CMS guidelines   Facility: [facility]     Denial #: [id number]     Appeal F# [phone]    Care Manager notified: Shannon Chadwick    Please reach out to CM for updates on any clinical information.

## 2024-06-18 NOTE — PHYSICAL THERAPY NOTE
PHYSICAL THERAPY TREATMENT NOTE  NAME:  Bailey Olsen  DATE: 06/18/24    Length Of Stay: 7  Performed at least 2 patient identifiers during session: Name and Birthday  Treatment time: 744-822  Treatment length: 38 min       06/18/24 0744   Note Type   Note Type Treatment   Pain Assessment   Pain Assessment Tool 0-10   Pain Score 8   Pain Location/Orientation Location: Head   Restrictions/Precautions   Other Precautions Chair Alarm;Bed Alarm;Fall Risk;Pain   General   Chart Reviewed Yes   Response to Previous Treatment Patient with no complaints from previous session.   Cognition   Orientation Level Oriented X4   Following Commands Follows one step commands with increased time or repetition   Bed Mobility   Additional Comments Pt seated OOB in recliner at start and end of session; bed mobility not assessed this session   Transfers   Sit to Stand   (SBA)   Additional items Increased time required;Verbal cues   Stand to Sit   (SBA)   Additional items Increased time required;Verbal cues   Stand pivot   (steadying assist -> SBA)   Additional items Increased time required;Verbal cues   Additional Comments RW for transfers with SBA for STS, steadying assist progressing to SBA for SPT with VC for hand placement and safety   Ambulation/Elevation   Gait pattern Improper Weight shift;Wide DENICE;Decreased foot clearance;Foward flexed;Short stride;Excessively slow   Gait Assistance   (steadying assist -> SBA)   Additional items Verbal cues   Assistive Device Rolling walker   Distance 20' x 2 with RW and steadying assist progressing to SBA   Balance   Static Sitting Good   Dynamic Sitting Fair +   Static Standing Fair   Dynamic Standing Fair -   Ambulatory Fair -   Endurance Deficit   Endurance Deficit Yes   Endurance Deficit Description fatigue   Activity Tolerance   Activity Tolerance Patient limited by fatigue   Medical Staff Made Aware aPt DAVALOS   Nurse Made Aware Gem WRAY   Exercises   Hamstring Sets Standing;20  reps;AROM;Bilateral  (HS)   Glute Sets Sitting;20 reps;AROM;Bilateral   Hip Flexion Sitting;20 reps;AROM;Bilateral   Hip Abduction Sitting;20 reps;AROM;Bilateral   Hip Adduction Sitting;20 reps;AROM;Bilateral   Knee AROM Long Arc Quad Sitting;AROM;Bilateral;20 reps   Ankle Pumps Standing;20 reps;AROM;Bilateral  (HR/TR)   Assessment   Prognosis Good   Problem List Decreased strength;Decreased endurance;Impaired balance;Decreased mobility;Decreased safety awareness;Obesity;Pain   Goals   PT Treatment Day 3   Plan   Treatment/Interventions Functional transfer training;LE strengthening/ROM;Elevations;Therapeutic exercise;Endurance training;Patient/family training;Equipment eval/education;Bed mobility;Gait training;Compensatory technique education   Progress Slow progress, decreased activity tolerance   PT Frequency 3-5x/wk   Discharge Recommendation   Rehab Resource Intensity Level, PT II (Moderate Resource Intensity)   AM-PAC Basic Mobility Inpatient   Turning in Flat Bed Without Bedrails 3   Lying on Back to Sitting on Edge of Flat Bed Without Bedrails 3   Moving Bed to Chair 3   Standing Up From Chair Using Arms 3   Walk in Room 3   Climb 3-5 Stairs With Railing 2   Basic Mobility Inpatient Raw Score 17   Basic Mobility Standardized Score 39.67   [facility] Highest Level Of Mobility   -HL Goal 5: Stand one or more mins   -[facility] Achieved 7: Walk 25 feet or more   Education   Education Provided Mobility training;Home exercise program;Assistive device   Patient Demonstrates acceptance/verbal understanding   End of Consult   Patient Position at End of Consult Bedside chair;Bed/Chair alarm activated;All needs within reach     The patient's AM-PAC Basic Mobility Inpatient Short Form Raw Score is 17. A Raw score of greater than 16 suggests the patient may benefit from discharge to home. Please also refer to the recommendation of the Physical Therapist for safe discharge planning.    Assessment: Pt seen for PT treatment  session this date with interventions consisting of gait training w/ emphasis on improving pt's ability to ambulate level surfaces x 20' x 2 with steadying assist progressing to SBA provided by therapist with RW and Therapeutic exercise consisting of: AROM 20 reps B LE in sitting and standing with B/L UE support position. Pt agreeable to PT treatment session upon arrival, pt found seated OOB in recliner, in no apparent distress. In comparison to previous session, pt with improvements in pt requires decreased assistance for mobility, ambulating increased distance and completes assigned therex . Post session: pt returned back to recliner, chair alarm engaged, all needs in reach, and RN notified of session findings/recommendations Continue to recommend  level II Resource Intensity  at time of d/c in order to maximize pt's functional independence and safety w/ mobility. Pt continues to be functioning below baseline level, and remains limited 2* factors listed above and including decreased strength, balance, mobility, and activity tolerance. PT will continue to see pt while here in order to address the deficits listed above and provide interventions consistent w/ POC in effort to achieve STGs.     Payal Castorena, PT,DPT

## 2024-06-18 NOTE — CASE MANAGEMENT
Case Management Discharge Planning Note    Patient name Bailey Olsen  Location /-01 MRN [id number]  : 1970 Date 2024       Current Admission Date: 2024  Current Admission Diagnosis:Hepatic encephalopathy (HCC)   Patient Active Problem List    Diagnosis Date Noted Date Diagnosed    Hepatic encephalopathy (HCC) 2024     Fall 2024     Other cirrhosis of liver (HCC) 2024     History of malignant carcinoid tumor of small intestine 2024     Class 3 severe obesity due to excess calories in adult (HCC) 2024     Primary hypertension 2024     Anxiety 2024     Hypokalemia 2024     Fibromyalgia 2024     Iron deficiency anemia 2024     Pancytopenia (HCC) 2024     Sarcoidosis 2024     Chronic diastolic (congestive) heart failure (HCC) 2024     Bilateral pulmonary infiltrates 2024       LOS (days): 7  Geometric Mean LOS (GMLOS) (days):   Days to GMLOS:     OBJECTIVE:  Risk of Unplanned Readmission Score: 16.62         Current admission status: Inpatient   Preferred Pharmacy:   [facility] [address] [place] [address] [address] [facility]  [address] [facility] [address]  Phone: [phone] Fax: [phone]    Primary Care Provider: Nayely Brown DO    Primary Insurance: [facility]  Secondary Insurance:     DISCHARGE DETAILS:     Denied by insurance after a Peer to Peer.  Patient is fine with this and wants to go home with [facility].  Notified [facility] today.  AVS was forward to them.

## 2024-06-18 NOTE — PLAN OF CARE
Problem: PAIN - ADULT  Goal: Verbalizes/displays adequate comfort level or baseline comfort level  Description: Interventions:  - Encourage patient to monitor pain and request assistance  - Assess pain using appropriate pain scale  - Administer analgesics based on type and severity of pain and evaluate response  - Implement non-pharmacological measures as appropriate and evaluate response  - Consider cultural and social influences on pain and pain management  - Notify physician/advanced practitioner if interventions unsuccessful or patient reports new pain  6/18/2024 1617 by Debbie Angeles LPN  Outcome: Adequate for Discharge  6/18/2024 0948 by Debbie Angeles LPN  Outcome: Progressing     Problem: INFECTION - ADULT  Goal: Absence or prevention of progression during hospitalization  Description: INTERVENTIONS:  - Assess and monitor for signs and symptoms of infection  - Monitor lab/diagnostic results  - Monitor all insertion sites, i.e. indwelling lines, tubes, and drains  - Monitor endotracheal if appropriate and nasal secretions for changes in amount and color  - [place] appropriate cooling/warming therapies per order  - Administer medications as ordered  - Instruct and encourage patient and family to use good hand hygiene technique  - Identify and instruct in appropriate isolation precautions for identified infection/condition  6/18/2024 1617 by Debbie Angeles LPN  Outcome: Adequate for Discharge  6/18/2024 0948 by Debbie Angeles LPN  Outcome: Progressing  Goal: Absence of fever/infection during neutropenic period  Description: INTERVENTIONS:  - Monitor WBC    6/18/2024 1617 by Debbie Angeles LPN  Outcome: Adequate for Discharge  6/18/2024 0948 by Debbie Angeles LPN  Outcome: Progressing     Problem: SAFETY ADULT  Goal: Patient will remain free of falls  Description: INTERVENTIONS:  - Educate patient/family on patient safety including physical limitations  - Instruct patient to call for assistance  with activity   - Consult OT/PT to assist with strengthening/mobility   - Keep Call bell within reach  - Keep bed low and locked with side rails adjusted as appropriate  - Keep care items and personal belongings within reach  - Initiate and maintain comfort rounds  - Make Fall Risk Sign visible to staff  - Offer Toileting every 2 Hours, in advance of need  - Initiate/Maintain   alarm  - Obtain necessary fall risk management equipment:     - Apply yellow socks and bracelet for high fall risk patients  - Consider moving patient to room near nurses station  6/18/2024 1617 by Debbie Angeles LPN  Outcome: Adequate for Discharge  6/18/2024 0948 by Debbie Angeles LPN  Outcome: Progressing  Goal: Maintain or return to baseline ADL function  Description: INTERVENTIONS:  -  Assess patient's ability to carry out ADLs; assess patient's baseline for ADL function and identify physical deficits which impact ability to perform ADLs (bathing, care of mouth/teeth, toileting, grooming, dressing, etc.)  - Assess/evaluate cause of self-care deficits   - Assess range of motion  - Assess patient's mobility; develop plan if impaired  - Assess patient's need for assistive devices and provide as appropriate  - Encourage maximum independence but intervene and supervise when necessary  - Involve family in performance of ADLs  - Assess for home care needs following discharge   - Consider OT consult to assist with ADL evaluation and planning for discharge  - Provide patient education as appropriate  6/18/2024 1617 by Debbie Angeles LPN  Outcome: Adequate for Discharge  6/18/2024 0948 by Debbie Angeles LPN  Outcome: Progressing  Goal: Maintains/Returns to pre admission functional level  Description: INTERVENTIONS:  - Perform AM-PAC 6 Click Basic Mobility/ Daily Activity assessment daily.  - Set and communicate daily mobility goal to care team and patient/family/caregiver.   - Collaborate with rehabilitation services on mobility goals if  consulted  - Perform Range of Motion 3 times a day.  - Reposition patient every 2 hours.  - Dangle patient 3 times a day  - Stand patient 3 times a day  - Ambulate patient 3 times a day  - Out of bed to chair 3 times a day   - Out of bed for meals 3 times a day  - Out of bed for toileting  - Record patient progress and toleration of activity level   6/18/2024 1617 by Debbie Angeles LPN  Outcome: Adequate for Discharge  6/18/2024 0948 by Debbie Angeles LPN  Outcome: Progressing     Problem: DISCHARGE PLANNING  Goal: Discharge to home or other facility with appropriate resources  Description: INTERVENTIONS:  - Identify barriers to discharge w/patient and caregiver  - Arrange for needed discharge resources and transportation as appropriate  - Identify discharge learning needs (meds, wound care, etc.)  - Arrange for interpretive services to assist at discharge as needed  - Refer to Case Management Department for coordinating discharge planning if the patient needs post-hospital services based on physician/advanced practitioner order or complex needs related to functional status, cognitive ability, or social support system  6/18/2024 1617 by Debbie Angeles LPN  Outcome: Adequate for Discharge  6/18/2024 0948 by Debbie Angeles LPN  Outcome: Progressing     Problem: Knowledge Deficit  Goal: Patient/family/caregiver demonstrates understanding of disease process, treatment plan, medications, and discharge instructions  Description: Complete learning assessment and assess knowledge base.  Interventions:  - Provide teaching at level of understanding  - Provide teaching via preferred learning methods  6/18/2024 1617 by Debbie Angeles LPN  Outcome: Adequate for Discharge  6/18/2024 0948 by Debbie Angeles LPN  Outcome: Progressing

## 2024-06-18 NOTE — ASSESSMENT & PLAN NOTE
"Presents to ED with increasing weakness and shakes with slight confusion for the last 3 to 4 days  Has history of known hepatic cirrhosis, states she was off lactulose for a week  Reviewed recent discharge diagnosis -was instructed to continue taking lactulose 10g TID  CT head revealing \" no acute intercranial abnormality\"  Ammonia 159  LFTs  -AST mildly elevated, bilirubin mildly elevated  INR 1.7 and down trending   Aspiration precautions  Avoid any sedatives or hepatotoxic medications   4 BM on 6/15, but ammonia still elevated. Decrease lactulose from every 2 hour to 30mg every 4 hours until 4-5 BM occur in 24 hours  Intake and output with BM counts to assist with lactulose titration  No ascites noted on CT abdomen pelvis but noted to have large stool burden.  Patient also is on chronic narcotic regimen which probably makes her more constipated than usual.    Continue Xifaxan as per home regimen  Had 4 BM On 6/16, patient's lactulose changed to 20 g tid with goal BM still 4-5 per day. Mental status at baseline.   Ammonia 50 on 6/18, only had 2 BM on 6/17, increase lactulose to 20 g qid at this time, discussed with patient to take extra dose of lactulose if not getting goal BM of 4-5 per day, explained that goal BM a day is 4-5. Also recommended decreasing dose if she is having multiple BM. Should repeat ammonia outpatient. Follow up with PCP outpatient and GI outpatient. Told her to call her PCP if she is having not enough BM and feeling constipated as well. Patient verbalized understanding.   "

## 2024-06-18 NOTE — OCCUPATIONAL THERAPY NOTE
Occupational Therapy Progress Note     Patient Name: Bailey Olsen  Today's Date: 6/18/2024  Problem List  Principal Problem:    Hepatic encephalopathy (HCC)  Active Problems:    Other cirrhosis of liver (HCC)    Class 3 severe obesity due to excess calories in adult (HCC)    Pancytopenia (HCC)    Sarcoidosis    Chronic diastolic (congestive) heart failure (HCC)    Fall    Hyponatremia       06/18/24 0743   Note Type   Note Type Treatment   Pain Assessment   Pain Assessment Tool Grant-Baker FACES   Grant-Baker FACES Pain Rating 6   Pain Location/Orientation Location: Head   Pain Onset/Description Descriptor: Aching   Restrictions/Precautions   Other Precautions Chair Alarm;Bed Alarm;Fall Risk;Pain   ADL   Eating Assistance 7  Independent   Eating Comments Pt received eating her breakfast in recliner chair independently.   Grooming Assistance   (SBA)   Grooming Deficit   (Deodorant)   Grooming Comments Pt able to apply deodorant in standing with SBA and RW for support.   UB Bathing Assistance   (SBA)   UB Bathing Deficit Chest;Right arm;Left arm;Abdomen   UB Bathing Comments Pt able to participate in UB bathing activity in standing with RW for UB support PRN, setup/SBA.   LB Bathing Assistance   (SBA/steadying assistance)   LB Bathing Deficit Buttocks;Right upper leg;Left upper leg;Right lower leg including foot;Left lower leg including foot   LB Bathing Comments Pt able to participate in LB bathing in standing with mostly SBA but moments of steadying assistance for balance.   UB Dressing Assistance 5  Supervision/Setup   UB Dressing Deficit Thread RUE;Thread LUE;Fasteners   UB Dressing Comments Pt able to don new gown in standing with setup and increased time.   LB Dressing Assistance   (Steadying assistance)   LB Dressing Deficit Thread RLE into underwear;Thread LLE into underwear;Pull up over hips   LB Dressing Comments Pt able to doff briefs in standing with steadying assistance and cues for sequencing. Attempted  to don new briefs in standing, however pt with difficulty completing due to balance deficits, able to thread through the LLE with steadying assistance but threading the RLE in seated, then pulling up over hips in standing.   Functional Standing Tolerance   Time 10 minutes   Activity UB/LB bathing dressing   Comments Pt able to complete UB/LB bathing in standing ~10 minutes varying between SBA and steadying assistance, RW for support PRN.   Bed Mobility   Additional Comments Not assessed, pt received sitting OOB in recliner chair upon start of session.   Transfers   Sit to Stand   (SBA)   Additional items Increased time required;Verbal cues   Stand to Sit   (SBA)   Additional items Increased time required;Verbal cues   Stand pivot   (Steadying assistance-->SBA)   Additional items Assist x 1;Increased time required;Verbal cues   Additional Comments All functional transfers with RW. Pt initially completing with steadying assistance and progresses to SBA.   Functional Mobility   Functional Mobility   (Steadying assistance-->SBA)   Additional Comments Pt participated in functional household distances in room x various trials initially with steadying assistance and progressing to SBA.   Cognition   Overall Cognitive Status WFL   Arousal/Participation Alert;Cooperative   Attention Within functional limits   Orientation Level Oriented X4   Following Commands Follows one step commands without difficulty   Activity Tolerance   Activity Tolerance Patient limited by fatigue;Patient limited by pain   Medical Staff Made Aware Payal FERRIS   Assessment   Assessment Pt completed OT tx session #2 focused on ADLs and functional mobility. Pt alert and agreeable to participate. PT/OT co-treat completed d/t significant mobility deficits and safety concerns. Pt with much improved cognition as compared to previous session. She feels that she is thinking more clearly but still does not feel herself. She continues to be limited by pain,  fatigue, and decreased generalized strength. She remains to be a good candidate for acute rehabilitation as she has demonstrated G participation and made great functional progress while at the hospital.   Plan   Treatment Interventions ADL retraining;Functional transfer training;UE strengthening/ROM;Endurance training;Cognitive reorientation;Patient/family training;Equipment evaluation/education;Compensatory technique education;Continued evaluation;Energy conservation;Activityengagement   Goal Expiration Date 06/26/24   OT Treatment Day 2   OT Frequency 2-3x/wk   Discharge Recommendation   Rehab Resource Intensity Level, OT II (Moderate Resource Intensity)   AM-PAC Daily Activity Inpatient   Lower Body Dressing 3   Bathing 3   Toileting 3   Upper Body Dressing 3   Grooming 3   Eating 4   Daily Activity Raw Score 19   Daily Activity Standardized Score (Calc for Raw Score >=11) 40.22   AM-PAC Applied Cognition Inpatient   Following a Speech/Presentation 2   Understanding Ordinary Conversation 3   Taking Medications 3   Remembering Where Things Are Placed or Put Away 3   Remembering List of 4-5 Errands 2   Taking Care of Complicated Tasks 2   Applied Cognition Raw Score 15   Applied Cognition Standardized Score 33.54   End of Consult   Patient Position at End of Consult Bedside chair;Bed/Chair alarm activated;All needs within reach     The patient's raw score on the -PAC Daily Activity Inpatient Short Form is 19. A raw score of greater than or equal to 19 suggests the patient may benefit from discharge to home. Please refer to the recommendation of the Occupational Therapist for safe discharge planning.    Pt goals to be met by 6/26/2024:    Short Term Goal #1 Pt will improve activity tolerance to G for min 30 min treatment sessions for increase engagement in functional tasks   Short Term Goal #2 Pt will complete bed mobility at a mod I level w/ G balance/safety demonstrated to decrease caregiver assistance required    Short Term Goal  Pt will complete LB dressing/self care w/ mod I using adaptive device and DME as needed   LTG Time Frame 10-14   Long Term Goal #1 Pt will complete toileting w/ mod I w/ G hygiene/thoroughness using DME as needed   Long Term Goal #2 Pt will improve functional transfers to mod I on/off all surfaces using DME as needed w/ G balance/safety   Long Term Goal Pt will improve functional mobility during ADL/IADL/leisure tasks to mod I using DME as needed w/ G balance/safety        Ryan Farah MS, OTR/L

## 2024-06-18 NOTE — CASE MANAGEMENT
Case Management Discharge Planning Note    Patient name Bailey Olsen  Location /-01 MRN [id number]  : 1970 Date 2024       Current Admission Date: 2024  Current Admission Diagnosis:Hepatic encephalopathy (HCC)   Patient Active Problem List    Diagnosis Date Noted Date Diagnosed    Hyponatremia 06/15/2024     Hepatic encephalopathy (HCC) 2024     Fall 2024     Other cirrhosis of liver (HCC) 2024     History of malignant carcinoid tumor of small intestine 2024     Class 3 severe obesity due to excess calories in adult (HCC) 2024     Primary hypertension 2024     Anxiety 2024     Hypokalemia 2024     Fibromyalgia 2024     Iron deficiency anemia 2024     Pancytopenia (HCC) 2024     Sarcoidosis 2024     Chronic diastolic (congestive) heart failure (HCC) 2024     Bilateral pulmonary infiltrates 2024       LOS (days): 7  Geometric Mean LOS (GMLOS) (days):   Days to GMLOS:     OBJECTIVE:  Risk of Unplanned Readmission Score: 16.54         Current admission status: Inpatient   Preferred Pharmacy:   [facility] [address] [place] [address] - [address] [facility]  [address] [facility] [address]  Phone: [phone] Fax: [phone]    Primary Care Provider: Nayely Brown DO    Primary Insurance: [facility]  Secondary Insurance:     DISCHARGE DETAILS:     Notified Provider of Insurance Intent to deny and a Peer to Peer needs to be done.

## 2024-06-18 NOTE — PROGRESS NOTES
Patient:    MRN:  [id number]    Elielin Request ID:  [id number]    Level of care reserved:  Home Health Agency    Partner Reserved:  [place] [address] [phone]    Clinical needs requested:  occupational therapy, physical therapy, skilled nursing    Geography searched:  [phone]    Start of Service:    Request sent:  10:03am EDT on 6/18/2024 by Maranda Chadwick    Partner reserved:  2:32pm EDT on 6/18/2024 by Maranda Chadwick    Choice list shared:  2:29pm EDT on 6/18/2024 by Maranda Chadwick

## 2024-06-18 NOTE — CASE MANAGEMENT
Case Management Discharge Planning Note    Patient name Bailey Olsen  Location /-01 MRN [id number]  : 1970 Date 2024       Current Admission Date: 2024  Current Admission Diagnosis:Hepatic encephalopathy (HCC)   Patient Active Problem List    Diagnosis Date Noted Date Diagnosed    Hyponatremia 06/15/2024     Hepatic encephalopathy (HCC) 2024     Fall 2024     Other cirrhosis of liver (HCC) 2024     History of malignant carcinoid tumor of small intestine 2024     Class 3 severe obesity due to excess calories in adult (HCC) 2024     Primary hypertension 2024     Anxiety 2024     Hypokalemia 2024     Fibromyalgia 2024     Iron deficiency anemia 2024     Pancytopenia (HCC) 2024     Sarcoidosis 2024     Chronic diastolic (congestive) heart failure (HCC) 2024     Bilateral pulmonary infiltrates 2024       LOS (days): 7  Geometric Mean LOS (GMLOS) (days):   Days to GMLOS:     OBJECTIVE:  Risk of Unplanned Readmission Score: 16.58         Current admission status: Inpatient   Preferred Pharmacy:   [facility] [address] [place] [address] [address] [facility]  [address] [facility] [address]  Phone: [phone] Fax: [phone]    Primary Care Provider: Nayely Brown DO    Primary Insurance: [facility]  Secondary Insurance:     DISCHARGE DETAILS:    Discharge planning discussed with:: patient  Freedom of Choice: Yes  Comments - Freedom of Choice: Discussed dc and she is aware that the P2P is being done at 2:30 for Acute Rehab- Per pt she has changed her mind and wants to go home with HHC. CM made a blanket referral for HHC earlier today in case pt was denied Acute Rehab. Pt is declining STR at a subacute facility. CM reviwed list of only agency available that will accept her insurance- [facility]. It is pts choice to accept HHC with [facility].  MARY contacted family/caregiver?: No- see  comments (pt verbalized she is keeping her sister and spouse updated)                     Other Referral/Resources/Interventions Provided:  Referral Comments: Secured [facility] for dc         Treatment Team Recommendation: Acute Rehab, Home with Home Health Care  Discharge Destination Plan:: Home with Home Health Care  Transport at Discharge : Family               Await final determination for Acute Rehab, pending P2P at this time.

## 2024-06-18 NOTE — PLAN OF CARE
Problem: PHYSICAL THERAPY ADULT  Goal: Performs mobility at highest level of function for planned discharge setting.  See evaluation for individualized goals.  Description: Treatment/Interventions: ADL retraining, Functional transfer training, LE strengthening/ROM, Elevations, Therapeutic exercise, Endurance training, Patient/family training, Equipment eval/education, Bed mobility, Gait training, Compensatory technique education, Spoke to nursing, Spoke to case management, OT          See flowsheet documentation for full assessment, interventions and recommendations.  Note: Prognosis: Good  Problem List: Decreased strength, Decreased endurance, Impaired balance, Decreased mobility, Decreased safety awareness, Obesity, Pain  Assessment: Pt seen for PT treatment session this date with interventions consisting of gait training w/ emphasis on improving pt's ability to ambulate level surfaces x 20' x 2 with steadying assist progressing to SBA provided by therapist with RW and Therapeutic exercise consisting of: AROM 20 reps B LE in sitting and standing with B/L UE support position. Pt agreeable to PT treatment session upon arrival, pt found seated OOB in recliner, in no apparent distress. In comparison to previous session, pt with improvements in pt requires decreased assistance for mobility, ambulating increased distance and completes assigned therex . Post session: pt returned back to recliner, chair alarm engaged, all needs in reach, and RN notified of session findings/recommendations Continue to recommend  level II Resource Intensity  at time of d/c in order to maximize pt's functional independence and safety w/ mobility. Pt continues to be functioning below baseline level, and remains limited 2* factors listed above and including decreased strength, balance, mobility, and activity tolerance. PT will continue to see pt while here in order to address the deficits listed above and provide interventions consistent w/  POC in effort to achieve STGs.  Barriers to Discharge: Inaccessible home environment, Decreased caregiver support  Barriers to Discharge Comments: pt has support form family, however is home alone during the day. increased fall risk, recent fall, requires asisstance to complete mobility  Rehab Resource Intensity Level, PT: II (Moderate Resource Intensity)    See flowsheet documentation for full assessment.

## 2024-06-18 NOTE — ASSESSMENT & PLAN NOTE
Patient experienced a fall in the ED  Still in the bathroom hitting head off sink  Repeat head CT no acute abnormality  PT/OT consult - I (Maximum Resource Intensity)   Pending insurance auth for acute rehab - auth denied, peer to peer done and auth still denied.   Discussed with patient, she would like to go home with [facility]. CM assisted with discharge planning. Patient to be discharged home with [facility].

## 2024-06-18 NOTE — PLAN OF CARE
Problem: PAIN - ADULT  Goal: Verbalizes/displays adequate comfort level or baseline comfort level  Description: Interventions:  - Encourage patient to monitor pain and request assistance  - Assess pain using appropriate pain scale  - Administer analgesics based on type and severity of pain and evaluate response  - Implement non-pharmacological measures as appropriate and evaluate response  - Consider cultural and social influences on pain and pain management  - Notify physician/advanced practitioner if interventions unsuccessful or patient reports new pain  Outcome: Progressing     Problem: INFECTION - ADULT  Goal: Absence or prevention of progression during hospitalization  Description: INTERVENTIONS:  - Assess and monitor for signs and symptoms of infection  - Monitor lab/diagnostic results  - Monitor all insertion sites, i.e. indwelling lines, tubes, and drains  - Monitor endotracheal if appropriate and nasal secretions for changes in amount and color  - [place] appropriate cooling/warming therapies per order  - Administer medications as ordered  - Instruct and encourage patient and family to use good hand hygiene technique  - Identify and instruct in appropriate isolation precautions for identified infection/condition  Outcome: Progressing  Goal: Absence of fever/infection during neutropenic period  Description: INTERVENTIONS:  - Monitor WBC    Outcome: Progressing     Problem: SAFETY ADULT  Goal: Patient will remain free of falls  Description: INTERVENTIONS:  - Educate patient/family on patient safety including physical limitations  - Instruct patient to call for assistance with activity   - Consult OT/PT to assist with strengthening/mobility   - Keep Call bell within reach  - Keep bed low and locked with side rails adjusted as appropriate  - Keep care items and personal belongings within reach  - Initiate and maintain comfort rounds  - Make Fall Risk Sign visible to staff  - Offer Toileting every 2 Hours,  in advance of need  - Initiate/Maintain alarm  - Obtain necessary fall risk management equipment  - Apply yellow socks and bracelet for high fall risk patients  - Consider moving patient to room near nurses station  Outcome: Progressing  Goal: Maintain or return to baseline ADL function  Description: INTERVENTIONS:  -  Assess patient's ability to carry out ADLs; assess patient's baseline for ADL function and identify physical deficits which impact ability to perform ADLs (bathing, care of mouth/teeth, toileting, grooming, dressing, etc.)  - Assess/evaluate cause of self-care deficits   - Assess range of motion  - Assess patient's mobility; develop plan if impaired  - Assess patient's need for assistive devices and provide as appropriate  - Encourage maximum independence but intervene and supervise when necessary  - Involve family in performance of ADLs  - Assess for home care needs following discharge   - Consider OT consult to assist with ADL evaluation and planning for discharge  - Provide patient education as appropriate  Outcome: Progressing  Goal: Maintains/Returns to pre admission functional level  Description: INTERVENTIONS:  - Perform AM-PAC 6 Click Basic Mobility/ Daily Activity assessment daily.  - Set and communicate daily mobility goal to care team and patient/family/caregiver.   - Collaborate with rehabilitation services on mobility goals if consulted  - Perform Range of Motion 3 times a day.  - Reposition patient every 2 hours.  - Dangle patient 3 times a day  - Stand patient 3 times a day  - Ambulate patient 3 times a day  - Out of bed to chair 3 times a day   - Out of bed for meals 3 times a day  - Out of bed for toileting  - Record patient progress and toleration of activity level   Outcome: Progressing     Problem: DISCHARGE PLANNING  Goal: Discharge to home or other facility with appropriate resources  Description: INTERVENTIONS:  - Identify barriers to discharge w/patient and caregiver  - Arrange  for needed discharge resources and transportation as appropriate  - Identify discharge learning needs (meds, wound care, etc.)  - Arrange for interpretive services to assist at discharge as needed  - Refer to Case Management Department for coordinating discharge planning if the patient needs post-hospital services based on physician/advanced practitioner order or complex needs related to functional status, cognitive ability, or social support system  Outcome: Progressing     Problem: Knowledge Deficit  Goal: Patient/family/caregiver demonstrates understanding of disease process, treatment plan, medications, and discharge instructions  Description: Complete learning assessment and assess knowledge base.  Interventions:  - Provide teaching at level of understanding  - Provide teaching via preferred learning methods  Outcome: Progressing

## 2024-06-18 NOTE — ASSESSMENT & PLAN NOTE
Wt Readings from Last 3 Encounters:   06/18/24 117 kg (258 lb 9.6 oz)   06/02/24 119 kg (263 lb 0.1 oz)     Recently discharged with weight 263 pounds  Echo 5/28/2024 -EF 65 to 69% with normal systolic function, normal wall motion and normal diastolic function, no significant valvular abnormality  On torsemide 20 mg twice daily, spironolactone 50 mg daily  Continue diuretics  Not in acute exacerbation  Continue outpatient follow up

## 2024-06-18 NOTE — PLAN OF CARE
Problem: PAIN - ADULT  Goal: Verbalizes/displays adequate comfort level or baseline comfort level  Description: Interventions:  - Encourage patient to monitor pain and request assistance  - Assess pain using appropriate pain scale  - Administer analgesics based on type and severity of pain and evaluate response  - Implement non-pharmacological measures as appropriate and evaluate response  - Consider cultural and social influences on pain and pain management  - Notify physician/advanced practitioner if interventions unsuccessful or patient reports new pain  Outcome: Progressing     Problem: INFECTION - ADULT  Goal: Absence or prevention of progression during hospitalization  Description: INTERVENTIONS:  - Assess and monitor for signs and symptoms of infection  - Monitor lab/diagnostic results  - Monitor all insertion sites, i.e. indwelling lines, tubes, and drains  - Monitor endotracheal if appropriate and nasal secretions for changes in amount and color  - [place] appropriate cooling/warming therapies per order  - Administer medications as ordered  - Instruct and encourage patient and family to use good hand hygiene technique  - Identify and instruct in appropriate isolation precautions for identified infection/condition  Outcome: Progressing  Goal: Absence of fever/infection during neutropenic period  Description: INTERVENTIONS:  - Monitor WBC    Outcome: Progressing     Problem: SAFETY ADULT  Goal: Patient will remain free of falls  Description: INTERVENTIONS:  - Educate patient/family on patient safety including physical limitations  - Instruct patient to call for assistance with activity   - Consult OT/PT to assist with strengthening/mobility   - Keep Call bell within reach  - Keep bed low and locked with side rails adjusted as appropriate  - Keep care items and personal belongings within reach  - Initiate and maintain comfort rounds  - Make Fall Risk Sign visible to staff  - Offer Toileting every    Hours,  in advance of need  - Initiate/Maintain   alarm  - Obtain necessary fall risk management equipment:     - Apply yellow socks and bracelet for high fall risk patients  - Consider moving patient to room near nurses station  Outcome: Progressing  Goal: Maintain or return to baseline ADL function  Description: INTERVENTIONS:  -  Assess patient's ability to carry out ADLs; assess patient's baseline for ADL function and identify physical deficits which impact ability to perform ADLs (bathing, care of mouth/teeth, toileting, grooming, dressing, etc.)  - Assess/evaluate cause of self-care deficits   - Assess range of motion  - Assess patient's mobility; develop plan if impaired  - Assess patient's need for assistive devices and provide as appropriate  - Encourage maximum independence but intervene and supervise when necessary  - Involve family in performance of ADLs  - Assess for home care needs following discharge   - Consider OT consult to assist with ADL evaluation and planning for discharge  - Provide patient education as appropriate  Outcome: Progressing  Goal: Maintains/Returns to pre admission functional level  Description: INTERVENTIONS:  - Perform AM-PAC 6 Click Basic Mobility/ Daily Activity assessment daily.  - Set and communicate daily mobility goal to care team and patient/family/caregiver.   - Collaborate with rehabilitation services on mobility goals if consulted  - Perform Range of Motion    times a day.  - Reposition patient every    hours.  - Dangle patient    times a day  - Stand patient    times a day  - Ambulate patient    times a day  - Out of bed to chair    times a day   - Out of bed for meals      times a day  - Out of bed for toileting  - Record patient progress and toleration of activity level   Outcome: Progressing     Problem: DISCHARGE PLANNING  Goal: Discharge to home or other facility with appropriate resources  Description: INTERVENTIONS:  - Identify barriers to discharge w/patient and  caregiver  - Arrange for needed discharge resources and transportation as appropriate  - Identify discharge learning needs (meds, wound care, etc.)  - Arrange for interpretive services to assist at discharge as needed  - Refer to Case Management Department for coordinating discharge planning if the patient needs post-hospital services based on physician/advanced practitioner order or complex needs related to functional status, cognitive ability, or social support system  Outcome: Progressing     Problem: Knowledge Deficit  Goal: Patient/family/caregiver demonstrates understanding of disease process, treatment plan, medications, and discharge instructions  Description: Complete learning assessment and assess knowledge base.  Interventions:  - Provide teaching at level of understanding  - Provide teaching via preferred learning methods  Outcome: Progressing

## 2024-06-18 NOTE — CASE MANAGEMENT
Support Center has received INTENT TO DENY for Acute Rehab Authorization.   Insurance: [facility]   Faxed in by Insurance.   Intent to Deny Reason: Does not not meet CMS guidelines;   Facility: [facility]    Pending Auth #: [id number]    Peer to Peer Phone#: [phone] Deadline: Not Provided   Appeal F#: [phone]    Please notify Discharge Support if P2P will not be completed.     [facility] Care Manager Team notified.     Please reach out to  for updates on any clinical information.     06/18 @ 810am - Received call from Rosario @ [facility] (P#: [phone]). Confirming auth details. Provided the info below:     Intent to Deny Reason: Care can be met at a lower level of care   P2P Deadline: 3 Business Days   MARY Notified: Shannon KNOX

## 2024-06-18 NOTE — DISCHARGE INSTR - AVS FIRST PAGE
Dear Bailey Olsen,     It was our pleasure to care for you here at [facility]. For follow up as well as any medication refills, we recommend that you follow up with your primary care physician. Here are the most important instructions/ recommendations at discharge:     Notable Medication Adjustments -   Start taking lactulose 20 g four times daily, can take an extra dose if not having 4 bowel movements daily or take 1 less dose if having more frequent bowel movements  Start taking miralax daily  Resume taking your pain medications as previously prescribed.   Testing Required after Discharge -   Repeat CBC, CMP, ammonia with PCP in 3 days and again in 1 week  Important follow up information -   Follow up with PCP in 1 week  Follow up with GI in 1-2 weeks  Follow up with hematology oncology as previously recommended  Other Instructions -   Please continue with taking medications as prescribed. It is important to continue to have around 4 bowel movements per day. If you start having any new or worsening symptoms, please return to the ED for further evaluation.   Please review this entire after visit summary as additional general instructions including medication list, appointments, activity, diet, any pertinent wound care, and other additional recommendations from your care team that may be provided for you.      Sincerely,     Kristyn Rodriguez PA-C

## 2024-06-19 NOTE — UTILIZATION REVIEW
NOTIFICATION OF ADMISSION DISCHARGE   This is a Notification of Discharge from [facility]. Please be advised that this patient has been discharge from our facility. Below you will find the admission and discharge date and time including the patient’s disposition.   UTILIZATION REVIEW CONTACT:  Елена Self  Utilization   Network Utilization Review Department  Phone: [phone] x carefully listen to the prompts. All voicemails are confidential.  Email: [email or web address]     ADMISSION INFORMATION  PRESENTATION DATE: 6/11/2024 11:36 AM  OBERVATION ADMISSION DATE:   INPATIENT ADMISSION DATE: 6/11/24  1:21 PM   DISCHARGE DATE: 6/18/2024  4:58 PM   DISPOSITION:Home with Home Health Care    Network Utilization Review Department  ATTENTION: Please call with any questions or concerns to [phone] and carefully listen to the prompts so that you are directed to the right person. All voicemails are confidential.   For Discharge needs, contact Care Management DC Support Team at [phone] opt. 2  Send all requests for admission clinical reviews, approved or denied determinations and any other requests to dedicated fax number below belonging to the campus where the patient is receiving treatment. List of dedicated fax numbers for the Facilities:  FACILITY NAME UR FAX NUMBER   ADMISSION DENIALS (Administrative/Medical Necessity) [phone]   DISCHARGE SUPPORT TEAM ([facility]) [phone]   PARENT CHILD HEALTH (Maternity/NICU/Pediatrics) [phone]   [facility] [phone]   [facility] [phone]   [facility] [phone]   [facility] [phone]   [facility] [phone]   [facility] [phone]   [facility] [phone]   [facility]  [facility] [phone]   [facility] [phone]   [facility] [phone]   [facility] [phone]   [facility] [phone]

## 2024-07-02 ENCOUNTER — APPOINTMENT (EMERGENCY)
Dept: RADIOLOGY | Facility: HOSPITAL | Age: 54
DRG: 291 | End: 2024-07-02
Payer: COMMERCIAL

## 2024-07-02 ENCOUNTER — HOSPITAL ENCOUNTER (INPATIENT)
Facility: HOSPITAL | Age: 54
LOS: 6 days | Discharge: HOME | DRG: 291 | End: 2024-07-08
Attending: STUDENT IN AN ORGANIZED HEALTH CARE EDUCATION/TRAINING PROGRAM | Admitting: FAMILY MEDICINE
Payer: COMMERCIAL

## 2024-07-02 DIAGNOSIS — R60.0 BILATERAL LOWER EXTREMITY EDEMA: ICD-10-CM

## 2024-07-02 DIAGNOSIS — K76.82 HEPATIC ENCEPHALOPATHY (HCC): ICD-10-CM

## 2024-07-02 DIAGNOSIS — D86.9 SARCOIDOSIS: ICD-10-CM

## 2024-07-02 DIAGNOSIS — E87.6 HYPOKALEMIA: ICD-10-CM

## 2024-07-02 DIAGNOSIS — Z87.19 HISTORY OF CIRRHOSIS OF LIVER: ICD-10-CM

## 2024-07-02 DIAGNOSIS — I50.9 CHF (CONGESTIVE HEART FAILURE) (HCC): ICD-10-CM

## 2024-07-02 DIAGNOSIS — I50.33 ACUTE ON CHRONIC DIASTOLIC HEART FAILURE (HCC): Primary | ICD-10-CM

## 2024-07-02 LAB
ALBUMIN SERPL BCG-MCNC: 3.4 G/DL (ref 3.5–5)
ALP SERPL-CCNC: 199 U/L (ref 34–104)
ALT SERPL W P-5'-P-CCNC: 25 U/L (ref 7–52)
AMMONIA PLAS-SCNC: 89 UMOL/L (ref 18–72)
ANION GAP SERPL CALCULATED.3IONS-SCNC: 6 MMOL/L (ref 4–13)
ANION GAP SERPL CALCULATED.3IONS-SCNC: 7 MMOL/L (ref 4–13)
AST SERPL W P-5'-P-CCNC: 42 U/L (ref 13–39)
ATRIAL RATE: 90 BPM
BILIRUB DIRECT SERPL-MCNC: 0.44 MG/DL (ref 0–0.2)
BILIRUB SERPL-MCNC: 1.61 MG/DL (ref 0.2–1)
BNP SERPL-MCNC: 34 PG/ML (ref 0–100)
BUN SERPL-MCNC: 7 MG/DL (ref 5–25)
BUN SERPL-MCNC: 7 MG/DL (ref 5–25)
CALCIUM SERPL-MCNC: 8.3 MG/DL (ref 8.4–10.2)
CALCIUM SERPL-MCNC: 8.6 MG/DL (ref 8.4–10.2)
CARDIAC TROPONIN I PNL SERPL HS: 3 NG/L (ref 8–18)
CHLORIDE SERPL-SCNC: 102 MMOL/L (ref 96–108)
CHLORIDE SERPL-SCNC: 103 MMOL/L (ref 96–108)
CO2 SERPL-SCNC: 29 MMOL/L (ref 21–32)
CO2 SERPL-SCNC: 32 MMOL/L (ref 21–32)
CREAT SERPL-MCNC: 0.78 MG/DL (ref 0.6–1.3)
CREAT SERPL-MCNC: 0.87 MG/DL (ref 0.6–1.3)
ERYTHROCYTE [DISTWIDTH] IN BLOOD BY AUTOMATED COUNT: 13.7 % (ref 11.6–15.1)
GFR SERPL CREATININE-BSD FRML MDRD: 75 ML/MIN/1.73SQ M
GFR SERPL CREATININE-BSD FRML MDRD: 86 ML/MIN/1.73SQ M
GLUCOSE SERPL-MCNC: 134 MG/DL (ref 65–140)
GLUCOSE SERPL-MCNC: 58 MG/DL (ref 65–140)
GLUCOSE SERPL-MCNC: 91 MG/DL (ref 65–140)
HCT VFR BLD AUTO: 35.8 % (ref 34.8–46.1)
HGB BLD-MCNC: 11.9 G/DL (ref 11.5–15.4)
MAGNESIUM SERPL-MCNC: 1.5 MG/DL (ref 1.9–2.7)
MCH RBC QN AUTO: 32.8 PG (ref 26.8–34.3)
MCHC RBC AUTO-ENTMCNC: 33.2 G/DL (ref 31.4–37.4)
MCV RBC AUTO: 99 FL (ref 82–98)
NRBC BLD AUTO-RTO: 0 /100 WBCS
P AXIS: 53 DEGREES
PLATELET # BLD AUTO: 93 THOUSANDS/UL (ref 149–390)
PMV BLD AUTO: 10 FL (ref 8.9–12.7)
POTASSIUM SERPL-SCNC: 2.8 MMOL/L (ref 3.5–5.3)
POTASSIUM SERPL-SCNC: 3.4 MMOL/L (ref 3.5–5.3)
PR INTERVAL: 170 MS
PROT SERPL-MCNC: 8 G/DL (ref 6.4–8.4)
QRS AXIS: 5 DEGREES
QRSD INTERVAL: 94 MS
QT INTERVAL: 390 MS
QTC INTERVAL: 477 MS
RBC # BLD AUTO: 3.63 MILLION/UL (ref 3.81–5.12)
SODIUM SERPL-SCNC: 138 MMOL/L (ref 135–147)
SODIUM SERPL-SCNC: 141 MMOL/L (ref 135–147)
T WAVE AXIS: 15 DEGREES
VENTRICULAR RATE: 90 BPM
WBC # BLD AUTO: 3.45 THOUSAND/UL (ref 4.31–10.16)

## 2024-07-02 PROCEDURE — 99285 EMERGENCY DEPT VISIT HI MDM: CPT | Performed by: STUDENT IN AN ORGANIZED HEALTH CARE EDUCATION/TRAINING PROGRAM

## 2024-07-02 PROCEDURE — 99222 1ST HOSP IP/OBS MODERATE 55: CPT | Performed by: FAMILY MEDICINE

## 2024-07-02 PROCEDURE — 85025 COMPLETE CBC W/AUTO DIFF WBC: CPT | Performed by: STUDENT IN AN ORGANIZED HEALTH CARE EDUCATION/TRAINING PROGRAM

## 2024-07-02 PROCEDURE — 99285 EMERGENCY DEPT VISIT HI MDM: CPT

## 2024-07-02 PROCEDURE — 84484 ASSAY OF TROPONIN QUANT: CPT | Performed by: STUDENT IN AN ORGANIZED HEALTH CARE EDUCATION/TRAINING PROGRAM

## 2024-07-02 PROCEDURE — 71045 X-RAY EXAM CHEST 1 VIEW: CPT

## 2024-07-02 PROCEDURE — 96375 TX/PRO/DX INJ NEW DRUG ADDON: CPT

## 2024-07-02 PROCEDURE — 80048 BASIC METABOLIC PNL TOTAL CA: CPT | Performed by: STUDENT IN AN ORGANIZED HEALTH CARE EDUCATION/TRAINING PROGRAM

## 2024-07-02 PROCEDURE — 96365 THER/PROPH/DIAG IV INF INIT: CPT

## 2024-07-02 PROCEDURE — 93010 ELECTROCARDIOGRAM REPORT: CPT | Performed by: INTERNAL MEDICINE

## 2024-07-02 PROCEDURE — 80048 BASIC METABOLIC PNL TOTAL CA: CPT | Performed by: FAMILY MEDICINE

## 2024-07-02 PROCEDURE — 93005 ELECTROCARDIOGRAM TRACING: CPT

## 2024-07-02 PROCEDURE — 82140 ASSAY OF AMMONIA: CPT | Performed by: STUDENT IN AN ORGANIZED HEALTH CARE EDUCATION/TRAINING PROGRAM

## 2024-07-02 PROCEDURE — 82948 REAGENT STRIP/BLOOD GLUCOSE: CPT

## 2024-07-02 PROCEDURE — 80076 HEPATIC FUNCTION PANEL: CPT | Performed by: STUDENT IN AN ORGANIZED HEALTH CARE EDUCATION/TRAINING PROGRAM

## 2024-07-02 PROCEDURE — 36415 COLL VENOUS BLD VENIPUNCTURE: CPT | Performed by: STUDENT IN AN ORGANIZED HEALTH CARE EDUCATION/TRAINING PROGRAM

## 2024-07-02 PROCEDURE — 83880 ASSAY OF NATRIURETIC PEPTIDE: CPT | Performed by: STUDENT IN AN ORGANIZED HEALTH CARE EDUCATION/TRAINING PROGRAM

## 2024-07-02 PROCEDURE — 83735 ASSAY OF MAGNESIUM: CPT | Performed by: STUDENT IN AN ORGANIZED HEALTH CARE EDUCATION/TRAINING PROGRAM

## 2024-07-02 RX ORDER — POTASSIUM CHLORIDE 20 MEQ/1
40 TABLET, EXTENDED RELEASE ORAL
Status: COMPLETED | OUTPATIENT
Start: 2024-07-02 | End: 2024-07-02

## 2024-07-02 RX ORDER — AMITRIPTYLINE HYDROCHLORIDE 25 MG/1
75 TABLET, FILM COATED ORAL
Status: DISCONTINUED | OUTPATIENT
Start: 2024-07-02 | End: 2024-07-08 | Stop reason: HOSPADM

## 2024-07-02 RX ORDER — FUROSEMIDE 10 MG/ML
40 INJECTION INTRAMUSCULAR; INTRAVENOUS 2 TIMES DAILY
Status: DISCONTINUED | OUTPATIENT
Start: 2024-07-02 | End: 2024-07-02

## 2024-07-02 RX ORDER — HYDROXYCHLOROQUINE SULFATE 200 MG/1
200 TABLET, FILM COATED ORAL 2 TIMES DAILY WITH MEALS
Status: DISCONTINUED | OUTPATIENT
Start: 2024-07-02 | End: 2024-07-03

## 2024-07-02 RX ORDER — FOLIC ACID 1 MG/1
1 TABLET ORAL DAILY
Status: DISCONTINUED | OUTPATIENT
Start: 2024-07-02 | End: 2024-07-08 | Stop reason: HOSPADM

## 2024-07-02 RX ORDER — LACTULOSE 20 G/30ML
20 SOLUTION ORAL 3 TIMES DAILY
Status: DISCONTINUED | OUTPATIENT
Start: 2024-07-02 | End: 2024-07-02

## 2024-07-02 RX ORDER — SERTRALINE HYDROCHLORIDE 100 MG/1
100 TABLET, FILM COATED ORAL DAILY
Status: DISCONTINUED | OUTPATIENT
Start: 2024-07-02 | End: 2024-07-08 | Stop reason: HOSPADM

## 2024-07-02 RX ORDER — LACTULOSE 20 G/30ML
20 SOLUTION ORAL 4 TIMES DAILY
Status: DISCONTINUED | OUTPATIENT
Start: 2024-07-02 | End: 2024-07-02

## 2024-07-02 RX ORDER — GABAPENTIN 100 MG/1
100 CAPSULE ORAL 2 TIMES DAILY
Status: DISCONTINUED | OUTPATIENT
Start: 2024-07-02 | End: 2024-07-08 | Stop reason: HOSPADM

## 2024-07-02 RX ORDER — DEXTROSE MONOHYDRATE 25 G/50ML
25 INJECTION, SOLUTION INTRAVENOUS ONCE
Status: COMPLETED | OUTPATIENT
Start: 2024-07-02 | End: 2024-07-02

## 2024-07-02 RX ORDER — DAPSONE 25 MG/1
50 TABLET ORAL DAILY
Status: COMPLETED | OUTPATIENT
Start: 2024-07-02 | End: 2024-07-02

## 2024-07-02 RX ORDER — MAGNESIUM SULFATE HEPTAHYDRATE 40 MG/ML
2 INJECTION, SOLUTION INTRAVENOUS ONCE
Status: COMPLETED | OUTPATIENT
Start: 2024-07-02 | End: 2024-07-02

## 2024-07-02 RX ORDER — OXYCODONE HYDROCHLORIDE 10 MG/1
10 TABLET ORAL ONCE
Status: COMPLETED | OUTPATIENT
Start: 2024-07-02 | End: 2024-07-02

## 2024-07-02 RX ORDER — FUROSEMIDE 10 MG/ML
60 INJECTION INTRAMUSCULAR; INTRAVENOUS 2 TIMES DAILY
Status: DISCONTINUED | OUTPATIENT
Start: 2024-07-02 | End: 2024-07-03

## 2024-07-02 RX ORDER — OXYCODONE HYDROCHLORIDE 10 MG/1
10 TABLET ORAL EVERY 6 HOURS PRN
Status: DISCONTINUED | OUTPATIENT
Start: 2024-07-02 | End: 2024-07-08

## 2024-07-02 RX ORDER — SPIRONOLACTONE 25 MG/1
50 TABLET ORAL DAILY
Status: DISCONTINUED | OUTPATIENT
Start: 2024-07-02 | End: 2024-07-08 | Stop reason: HOSPADM

## 2024-07-02 RX ORDER — LACTULOSE 10 G/15ML
20 SOLUTION ORAL 4 TIMES DAILY
Status: DISCONTINUED | OUTPATIENT
Start: 2024-07-02 | End: 2024-07-03

## 2024-07-02 RX ADMIN — DEXTROSE MONOHYDRATE 25 ML: 25 INJECTION, SOLUTION INTRAVENOUS at 11:42

## 2024-07-02 RX ADMIN — SPIRONOLACTONE 50 MG: 25 TABLET ORAL at 14:01

## 2024-07-02 RX ADMIN — MAGNESIUM SULFATE HEPTAHYDRATE 2 G: 40 INJECTION, SOLUTION INTRAVENOUS at 11:41

## 2024-07-02 RX ADMIN — OXYCODONE HYDROCHLORIDE 10 MG: 10 TABLET ORAL at 12:11

## 2024-07-02 RX ADMIN — AMITRIPTYLINE HYDROCHLORIDE 75 MG: 25 TABLET, FILM COATED ORAL at 21:50

## 2024-07-02 RX ADMIN — POTASSIUM CHLORIDE 40 MEQ: 1500 TABLET, EXTENDED RELEASE ORAL at 11:41

## 2024-07-02 RX ADMIN — OXYCODONE HYDROCHLORIDE 10 MG: 10 TABLET ORAL at 15:17

## 2024-07-02 RX ADMIN — FUROSEMIDE 60 MG: 10 INJECTION, SOLUTION INTRAMUSCULAR; INTRAVENOUS at 14:01

## 2024-07-02 RX ADMIN — OXYCODONE HYDROCHLORIDE 10 MG: 10 TABLET ORAL at 21:50

## 2024-07-02 RX ADMIN — HYDROXYCHLOROQUINE SULFATE 200 MG: 200 TABLET, FILM COATED ORAL at 16:08

## 2024-07-02 RX ADMIN — LACTULOSE 20 G: 20 SOLUTION ORAL at 18:10

## 2024-07-02 RX ADMIN — GABAPENTIN 100 MG: 100 CAPSULE ORAL at 18:10

## 2024-07-02 RX ADMIN — LACTULOSE 20 G: 20 SOLUTION ORAL at 14:01

## 2024-07-02 RX ADMIN — DAPSONE 50 MG: 25 TABLET ORAL at 14:29

## 2024-07-02 RX ADMIN — POTASSIUM CHLORIDE 40 MEQ: 1500 TABLET, EXTENDED RELEASE ORAL at 14:10

## 2024-07-02 RX ADMIN — RIFAXIMIN 550 MG: 550 TABLET ORAL at 21:49

## 2024-07-02 RX ADMIN — SERTRALINE HYDROCHLORIDE 100 MG: 100 TABLET ORAL at 14:01

## 2024-07-02 RX ADMIN — RIFAXIMIN 550 MG: 550 TABLET ORAL at 14:11

## 2024-07-02 RX ADMIN — LACTULOSE 20 G: 20 SOLUTION ORAL at 21:50

## 2024-07-02 RX ADMIN — FOLIC ACID 1 MG: 1 TABLET ORAL at 14:01

## 2024-07-02 NOTE — PLAN OF CARE
Problem: PAIN - ADULT  Goal: Verbalizes/displays adequate comfort level or baseline comfort level  Description: Interventions:  - Encourage patient to monitor pain and request assistance  - Assess pain using appropriate pain scale  - Administer analgesics based on type and severity of pain and evaluate response  - Implement non-pharmacological measures as appropriate and evaluate response  - Consider cultural and social influences on pain and pain management  - Notify physician/advanced practitioner if interventions unsuccessful or patient reports new pain  Outcome: Progressing     Problem: INFECTION - ADULT  Goal: Absence or prevention of progression during hospitalization  Description: INTERVENTIONS:  - Assess and monitor for signs and symptoms of infection  - Monitor lab/diagnostic results  - Monitor all insertion sites, i.e. indwelling lines, tubes, and drains  - Monitor endotracheal if appropriate and nasal secretions for changes in amount and color  - [place] appropriate cooling/warming therapies per order  - Administer medications as ordered  - Instruct and encourage patient and family to use good hand hygiene technique  - Identify and instruct in appropriate isolation precautions for identified infection/condition  Outcome: Progressing     Problem: SAFETY ADULT  Goal: Patient will remain free of falls  Description: INTERVENTIONS:  - Educate patient/family on patient safety including physical limitations  - Instruct patient to call for assistance with activity   - Consult OT/PT to assist with strengthening/mobility   - Keep Call bell within reach  - Keep bed low and locked with side rails adjusted as appropriate  - Keep care items and personal belongings within reach  - Initiate and maintain comfort rounds  - Make Fall Risk Sign visible to staff    - Apply yellow socks and bracelet for high fall risk patients  - Consider moving patient to room near nurses station  Outcome: Progressing  Goal: Maintain or  return to baseline ADL function  Description: INTERVENTIONS:  -  Assess patient's ability to carry out ADLs; assess patient's baseline for ADL function and identify physical deficits which impact ability to perform ADLs (bathing, care of mouth/teeth, toileting, grooming, dressing, etc.)  - Assess/evaluate cause of self-care deficits   - Assess range of motion  - Assess patient's mobility; develop plan if impaired  - Assess patient's need for assistive devices and provide as appropriate  - Encourage maximum independence but intervene and supervise when necessary  - Involve family in performance of ADLs  - Assess for home care needs following discharge   - Consider OT consult to assist with ADL evaluation and planning for discharge  - Provide patient education as appropriate  Outcome: Progressing  Goal: Maintains/Returns to pre admission functional level  Description: INTERVENTIONS:  - Perform AM-PAC 6 Click Basic Mobility/ Daily Activity assessment daily.  - Set and communicate daily mobility goal to care team and patient/family/caregiver.   - Collaborate with rehabilitation services on mobility goals if consulted    - Out of bed for toileting  - Record patient progress and toleration of activity level   Outcome: Progressing     Problem: DISCHARGE PLANNING  Goal: Discharge to home or other facility with appropriate resources  Description: INTERVENTIONS:  - Identify barriers to discharge w/patient and caregiver  - Arrange for needed discharge resources and transportation as appropriate  - Identify discharge learning needs (meds, wound care, etc.)  - Arrange for interpretive services to assist at discharge as needed  - Refer to Case Management Department for coordinating discharge planning if the patient needs post-hospital services based on physician/advanced practitioner order or complex needs related to functional status, cognitive ability, or social support system  Outcome: Progressing     Problem: Knowledge  Deficit  Goal: Patient/family/caregiver demonstrates understanding of disease process, treatment plan, medications, and discharge instructions  Description: Complete learning assessment and assess knowledge base.  Interventions:  - Provide teaching at level of understanding  - Provide teaching via preferred learning methods  Outcome: Progressing     Problem: CARDIOVASCULAR - ADULT  Goal: Maintains optimal cardiac output and hemodynamic stability  Description: INTERVENTIONS:  - Monitor I/O, vital signs and rhythm  - Monitor for S/S and trends of decreased cardiac output  - Administer and titrate ordered vasoactive medications to optimize hemodynamic stability  - Assess quality of pulses, skin color and temperature  - Assess for signs of decreased coronary artery perfusion  - Instruct patient to report change in severity of symptoms  Outcome: Progressing  Goal: Absence of cardiac dysrhythmias or at baseline rhythm  Description: INTERVENTIONS:  - Continuous cardiac monitoring, vital signs, obtain 12 lead EKG if ordered  - Administer antiarrhythmic and heart rate control medications as ordered  - Monitor electrolytes and administer replacement therapy as ordered  Outcome: Progressing     Problem: MOBILITY - ADULT  Goal: Maintain or return to baseline ADL function  Description: INTERVENTIONS:  -  Assess patient's ability to carry out ADLs; assess patient's baseline for ADL function and identify physical deficits which impact ability to perform ADLs (bathing, care of mouth/teeth, toileting, grooming, dressing, etc.)  - Assess/evaluate cause of self-care deficits   - Assess range of motion  - Assess patient's mobility; develop plan if impaired  - Assess patient's need for assistive devices and provide as appropriate  - Encourage maximum independence but intervene and supervise when necessary  - Involve family in performance of ADLs  - Assess for home care needs following discharge   - Consider OT consult to assist with ADL  evaluation and planning for discharge  - Provide patient education as appropriate  Outcome: Progressing  Goal: Maintains/Returns to pre admission functional level  Description: INTERVENTIONS:  - Perform AM-PAC 6 Click Basic Mobility/ Daily Activity assessment daily.  - Set and communicate daily mobility goal to care team and patient/family/caregiver.   - Collaborate with rehabilitation services on mobility goals if consulted    - Out of bed for toileting  - Record patient progress and toleration of activity level   Outcome: Progressing

## 2024-07-02 NOTE — ASSESSMENT & PLAN NOTE
Patient has chronic pancytopenia due to liver issues.  Patient has a sepsis alert however she does not have sepsis or SIRS at this time.  No acute infection and this is all secondary to her chronic liver disease

## 2024-07-02 NOTE — ED PROVIDER NOTES
History  Chief Complaint   Patient presents with    Shortness of Breath     Pt. Voiced that she has hx of CHF and has been retaining fluid for the past couple of days.     53 year old F. Presents with worsening lower extremity edema, abdominal distension. Worsening over the last 2 weeks but has become more noticeable over the last 7 days. Has been taking her doses of spironolactone and torsemide. Was instructed by her doctor to increased her torsemide from 20 mg to 40 mg in the AM. The patient states that she gained between 15-20 lb since hospital discharge on 6/18.       History provided by:  Patient and medical records  Medical Problem  Location:  Bilateral lower extremity edema, abdominal distention  Severity:  Moderate  Onset quality:  Gradual  Duration:  2 weeks  Timing:  Constant  Progression:  Worsening  Chronicity:  Recurrent  Ineffective treatments:  Oral diuretics  Associated symptoms: abdominal pain, fatigue and shortness of breath    Associated symptoms: no chest pain, no congestion, no diarrhea, no headaches, no nausea, no vomiting and no wheezing      Prior to Admission Medications   Prescriptions Last Dose Informant Patient Reported? Taking?   Ergocalciferol (VITAMIN D2 PO)   Yes No   Sig: Take 1 capsule by mouth once a week Mondays   Xifaxan 550 MG tablet   Yes No   Sig: Take 550 mg by mouth 2 (two) times a day   acetaminophen (TYLENOL) 325 mg tablet   No No   Sig: Take 2 tablets (650 mg total) by mouth every 6 (six) hours as needed for fever, mild pain or headaches   amitriptyline (ELAVIL) 75 mg tablet   Yes No   Sig: Take 75 mg by mouth daily at bedtime   dapsone 25 mg tablet   No No   Sig: Take 2 tablets (50 mg total) by mouth daily   folic acid (FOLVITE) 1 mg tablet   Yes No   Sig: Take 1 mg by mouth daily   gabapentin (NEURONTIN) 100 mg capsule   No No   Sig: Take 1 capsule (100 mg total) by mouth 2 (two) times a day   hydrocortisone 1 % cream   No No   Sig: Apply topically 2 (two) times a day    hydroxychloroquine (PLAQUENIL) 200 mg tablet   Yes No   Sig: Take 200 mg by mouth 2 (two) times a day with meals   lactulose (CHRONULAC) 10 g/15 mL solution   No No   Sig: Take 30 mL (20 g total) by mouth 4 (four) times a day Goal bowel movements is 4 a day   polyethylene glycol (MIRALAX) 17 g packet   No No   Sig: Take 17 g by mouth daily   sertraline (ZOLOFT) 100 mg tablet   No No   Sig: Take 1 tablet (100 mg total) by mouth daily   spironolactone (ALDACTONE) 50 mg tablet   No No   Sig: Take 1 tablet (50 mg total) by mouth daily   torsemide (DEMADEX) 20 mg tablet   No No   Sig: Take 1 tablet (20 mg total) by mouth 2 (two) times a day before meals      Facility-Administered Medications: None       Past Medical History:   Diagnosis Date    Carcinoid tumor     neuroendocrine    CHF (congestive heart failure) (HCC)     Cirrhosis of liver (HCC)        History reviewed. No pertinent surgical history.    History reviewed. No pertinent family history.  I have reviewed and agree with the history as documented.    E-Cigarette/Vaping    E-Cigarette Use Never User      E-Cigarette/Vaping Substances     Social History     Tobacco Use    Smoking status: Never    Smokeless tobacco: Never   Vaping Use    Vaping status: Never Used   Substance Use Topics    Alcohol use: Never    Drug use: Never       Review of Systems   Constitutional:  Positive for fatigue and unexpected weight change. Negative for activity change and appetite change.   HENT:  Negative for congestion.    Respiratory:  Positive for shortness of breath. Negative for chest tightness and wheezing.    Cardiovascular:  Positive for leg swelling. Negative for chest pain and palpitations.   Gastrointestinal:  Positive for abdominal pain. Negative for diarrhea, nausea and vomiting.   Neurological:  Negative for dizziness, syncope, light-headedness and headaches.   All other systems reviewed and are negative.    Physical Exam  Physical Exam  Constitutional:       General:  She is not in acute distress.     Appearance: She is ill-appearing. She is not toxic-appearing.   HENT:      Head: Normocephalic and atraumatic.   Eyes:      Extraocular Movements: Extraocular movements intact.   Cardiovascular:      Rate and Rhythm: Normal rate and regular rhythm.      Pulses: Normal pulses.      Heart sounds: Murmur heard.   Pulmonary:      Effort: No tachypnea, accessory muscle usage or respiratory distress.      Breath sounds: Decreased breath sounds and rales present. No wheezing or rhonchi.   Chest:      Chest wall: No tenderness.   Abdominal:      General: Bowel sounds are normal.      Palpations: Abdomen is soft.      Tenderness: There is no abdominal tenderness. There is no rebound.   Musculoskeletal:      Right lower leg: Edema present.      Left lower leg: Edema present.   Skin:     General: Skin is warm and dry.      Findings: Ecchymosis present.   Neurological:      General: No focal deficit present.      Mental Status: She is alert and oriented to person, place, and time.   Psychiatric:         Mood and Affect: Mood normal.         Behavior: Behavior normal.         Vital Signs  ED Triage Vitals [07/02/24 1029]   Temperature Pulse Respirations Blood Pressure SpO2   98.6 °F (37 °C) 90 18 156/81 98 %      Temp Source Heart Rate Source Patient Position - Orthostatic VS BP Location FiO2 (%)   Oral Monitor Lying Right arm --      Pain Score       6           Vitals:    07/02/24 1029   BP: 156/81   Pulse: 90   Patient Position - Orthostatic VS: Lying       ED Medications  Medications   potassium chloride (Klor-Con M20) CR tablet 40 mEq (40 mEq Oral Given 7/2/24 1141)   magnesium sulfate 2 g/50 mL IVPB (premix) 2 g (0 g Intravenous Stopped 7/2/24 1224)   dextrose 50 % IV solution 25 mL (25 mL Intravenous Given 7/2/24 1142)   oxyCODONE (ROXICODONE) immediate release tablet 10 mg (10 mg Oral Given 7/2/24 1211)       Diagnostic Studies  Results Reviewed       Procedure Component Value Units  Date/Time    Fingerstick Glucose (POCT) [[id number]]  (Normal) Collected: 07/02/24 1215    Lab Status: Final result Specimen: Blood Updated: 07/02/24 1216     POC Glucose 91 mg/dl     Magnesium [[id number]]  (Abnormal) Collected: 07/02/24 1054    Lab Status: Final result Specimen: Blood from Arm, Left Updated: 07/02/24 1215     Magnesium 1.5 mg/dL     B-Type Natriuretic Peptide(BNP) [[id number]]  (Normal) Collected: 07/02/24 1054    Lab Status: Final result Specimen: Blood from Arm, Left Updated: 07/02/24 1215     BNP 34 pg/mL     CBC and differential [[id number]]  (Abnormal) Collected: 07/02/24 1054    Lab Status: Final result Specimen: Blood from Arm, Left Updated: 07/02/24 1156     WBC 3.45 Thousand/uL      RBC 3.63 Million/uL      Hemoglobin 11.9 g/dL      Hematocrit 35.8 %      MCV 99 fL      MCH 32.8 pg      MCHC 33.2 g/dL      RDW 13.7 %      MPV 10.0 fL      Platelets 93 Thousands/uL      nRBC 0 /100 WBCs     High Sensitivity Troponin I Random [[id number]]  (Abnormal) Collected: 07/02/24 1054    Lab Status: Final result Specimen: Blood from Arm, Left Updated: 07/02/24 1129     HS TnI random 3 ng/L     Ammonia [[id number]]  (Abnormal) Collected: 07/02/24 1054    Lab Status: Final result Specimen: Blood from Arm, Left Updated: 07/02/24 1125     Ammonia 89 umol/L     Hepatic function panel [[id number]]  (Abnormal) Collected: 07/02/24 1054    Lab Status: Final result Specimen: Blood from Arm, Left Updated: 07/02/24 1123     Total Bilirubin 1.61 mg/dL      Bilirubin, Direct 0.44 mg/dL      Alkaline Phosphatase 199 U/L      AST 42 U/L      ALT 25 U/L      Total Protein 8.0 g/dL      Albumin 3.4 g/dL     Basic metabolic panel [[id number]]  (Abnormal) Collected: 07/02/24 1054    Lab Status: Final result Specimen: Blood from Arm, Left Updated: 07/02/24 1123     Sodium 141 mmol/L      Potassium 2.8 mmol/L      Chloride 102 mmol/L      CO2 32 mmol/L      ANION GAP 7 mmol/L      BUN 7 mg/dL      Creatinine 0.78 mg/dL       Glucose 58 mg/dL      Calcium 8.6 mg/dL      eGFR 86 ml/min/1.73sq m     Narrative:      [employer] guidelines for Chronic Kidney Disease (CKD):     Stage 1 with normal or high GFR (GFR > 90 mL/min/1.73 square meters)    Stage 2 Mild CKD (GFR = 60-89 mL/min/1.73 square meters)    Stage 3A Moderate CKD (GFR = 45-59 mL/min/1.73 square meters)    Stage 3B Moderate CKD (GFR = 30-44 mL/min/1.73 square meters)    Stage 4 Severe CKD (GFR = 15-29 mL/min/1.73 square meters)    Stage 5 End Stage CKD (GFR <15 mL/min/1.73 square meters)  Note: GFR calculation is accurate only with a steady state creatinine               XR chest 1 view portable   ED Interpretation by Cheng Ivy DO (07/02 1139)   No acute findings.              Procedures  ECG 12 Lead Documentation Only    Date/Time: 7/2/2024 10:28 AM    Performed by: Cheng Ivy DO  Authorized by: Cheng Ivy DO    Indications / Diagnosis:  Lower extremity edema, shortness of breath  Patient location:  ED  Previous ECG:     Previous ECG:  Compared to current    Comparison ECG info:  6/11/2024    Similarity:  No change  Interpretation:     Interpretation: normal    Rhythm:     Rhythm: sinus rhythm    Ectopy:     Ectopy: none    QRS:     QRS axis:  Normal    QRS intervals:  Normal  ST segments:     ST segments:  Normal  T waves:     T waves: normal         ED Course  ED Course as of 07/02/24 1256   Tue Jul 02, 2024   1045 Vital signs reviewed. No signs of respiratory distress.  Not requiring supplemental oxygen.  Presents with worsening abdominal distention, lower extremity edema.  Has been compliant with all occasions.  History of CHF, cirrhosis.  The patient appears hypervolemic.  Will obtain basic labs, chest x-ray.  Will administer dose of IV Lasix.   1138 Laboratory workup significant for hypokalemia, mild hypoglycemia.  Will administer a dose potassium chloride, D50. Holding off on Lasix administration until electrolytes are  corrected.    1139 X-ray imaging interpreted by me.  No acute findings.   1148 The case was discussed with hospital medicine. Admission accepted.      Medical Decision Making  This patient presents with weight gain, bilateral lower extremity edema.   Diagnostic considerations include decompensated heart failure, pulmonary edema, decompensated liver failure, acute kidney injury. See ED Course.         Problems Addressed:  Acute on chronic diastolic heart failure (HCC): chronic illness or injury with exacerbation, progression, or side effects of treatment that poses a threat to life or bodily functions  Bilateral lower extremity edema: chronic illness or injury with exacerbation, progression, or side effects of treatment  History of cirrhosis of liver: chronic illness or injury  Hypokalemia: acute illness or injury    Amount and/or Complexity of Data Reviewed  External Data Reviewed: notes.     Details: Hospital discharge documentation dated 6/18 reviewed.   Labs: ordered. Decision-making details documented in ED Course.  Radiology: ordered and independent interpretation performed. Decision-making details documented in ED Course.  ECG/medicine tests: ordered and independent interpretation performed. Decision-making details documented in ED Course.    Risk  Prescription drug management.  Decision regarding hospitalization.      Disposition  Final diagnoses:   Acute on chronic diastolic heart failure (HCC)   Bilateral lower extremity edema   Hypokalemia   History of cirrhosis of liver     Time reflects when diagnosis was documented in both MDM as applicable and the Disposition within this note       Time User Action Codes Description Comment    7/2/2024 12:10 PM Cheng Ivy [I50.33] Acute on chronic diastolic heart failure (HCC)     7/2/2024 12:10 PM Cheng Ivy [R60.0] Bilateral lower extremity edema     7/2/2024 12:11 PM Cheng Ivy [E87.6] Hypokalemia     7/2/2024 12:11 PM Cheng Ivy Add  [Z87.19] History of cirrhosis of liver           ED Disposition       ED Disposition   Admit    Condition   Stable    Date/Time   Tue Jul 2, 2024 12:10 PM    Comment   Case was discussed with Dr. Keyes and the patient's admission status was agreed to be Admission Status: inpatient status to the service of Dr. Stuart.               Follow-up Information    None         Patient's Medications   Discharge Prescriptions    No medications on file       No discharge procedures on file.    PDMP Review         Value Time User    PDMP Reviewed  Yes 6/18/2024  2:50 PM Kristyn Rodriguez PA-C            ED Provider  Electronically Signed by             Cheng Ivy DO  07/02/24 1258

## 2024-07-02 NOTE — ASSESSMENT & PLAN NOTE
Has low potassium secondary to diuretic use will replace with IV potassium was ordered in the ED repeat BMP again tonight and replace further as required be cautious as patient is also on Aldactone  Also Received magnesium supplementation

## 2024-07-02 NOTE — H&P
[facility]  H&P  Name: Bailey Olsen 53 y.o. female I MRN: [id number]  Unit/Bed#: -01 I Date of Admission: 7/2/2024   Date of Service: 7/2/2024 I Hospital Day: 0      Assessment & Plan   * Acute on chronic diastolic (congestive) heart failure (HCC)  Assessment & Plan  Wt Readings from Last 3 Encounters:   07/02/24 125 kg (274 lb 14.6 oz)   06/18/24 117 kg (258 lb 9.6 oz)   06/02/24 119 kg (263 lb 0.1 oz)     Patient states that she was taking torsemide and Aldactone at home but despite that she was gaining weight again and got concerned her weight today is 274 pounds on her previous discharge was down to 258 pounds.  Hold oral torsemide placed on Lasix 60 mg IV twice daily continue Aldactone orally.  Replace potassium and magnesium and continue diuresis  2D echo done 05/2024 was normal EF          Hypokalemia  Assessment & Plan  Has low potassium secondary to diuretic use will replace with IV potassium was ordered in the ED repeat BMP again tonight and replace further as required be cautious as patient is also on Aldactone  Also Received magnesium supplementation    Other cirrhosis of liver (HCC)  Assessment & Plan  She has known history of liver cirrhosis now presents with ammonia level of 89  She states she was taking lactulose however stopped Xifaxan at home as she was not having much response from that.  Recommended to her to continue both medications and resume both and recheck ammonia level in a.m.    Pancytopenia (HCC)  Assessment & Plan  Patient has chronic pancytopenia due to liver issues.  Patient has a sepsis alert however she does not have sepsis or SIRS at this time.  No acute infection and this is all secondary to her chronic liver disease    VTE Prophylaxis: Pharmacologic VTE Prophylaxis contraindicated due to pancytopenia   / sequential compression device   Code Status: full code  POLST: There is no POLST form on file for this patient (pre-hospital)  Discussion with  family: Sister at bedside    Anticipated Length of Stay:  Patient will be admitted on an Inpatient basis with an anticipated length of stay of more than 2 midnights.   Justification for Hospital Stay: Acute on chronic diastolic CHF    Total Time for Visit, including Counseling / Coordination of Care: 60 minutes.  Greater than 50% of this total time spent on direct patient counseling and coordination of care.    Chief Complaint:   Leg swelling    History of Present Illness:    Bailey Olsen is a 53 y.o. female who presents with not feeling well complaining of worsening lower extremity edema abdominal distention which is progressively getting worse for the last 2 weeks.  She is gained 50 to 20 pounds since hospital discharge which concerned her she states she was taking her torsemide and Aldactone but was not taking the Xifaxan.  She also now started having some shortness of breath with exertion and decided to come to the ER before it got any worse.  Denies any chest pain or fevers or chills or dysuria or diarrhea    Review of Systems:    Review of Systems   Constitutional:  Positive for fatigue. Negative for appetite change, chills and fever.   HENT:  Negative for hearing loss, sore throat and trouble swallowing.    Eyes:  Negative for photophobia, discharge and visual disturbance.   Respiratory:  Positive for shortness of breath. Negative for chest tightness.    Cardiovascular:  Positive for leg swelling. Negative for chest pain and palpitations.   Gastrointestinal:  Negative for abdominal pain, blood in stool and vomiting.   Endocrine: Negative for polydipsia and polyuria.   Genitourinary:  Negative for difficulty urinating, dysuria, flank pain and hematuria.   Musculoskeletal:  Negative for back pain and gait problem.   Skin:  Negative for rash.   Allergic/Immunologic: Negative for environmental allergies and food allergies.   Neurological:  Positive for weakness. Negative for dizziness, seizures, syncope and  headaches.   Hematological:  Does not bruise/bleed easily.   Psychiatric/Behavioral:  Negative for behavioral problems.    All other systems reviewed and are negative.      Past Medical and Surgical History:     Past Medical History:   Diagnosis Date    Carcinoid tumor     neuroendocrine    CHF (congestive heart failure) (HCC)     Cirrhosis of liver (HCC)        History reviewed. No pertinent surgical history.    Meds/Allergies:    Prior to Admission medications    Medication Sig Start Date End Date Taking? Authorizing Provider   amitriptyline (ELAVIL) 75 mg tablet Take 75 mg by mouth daily at bedtime 4/8/24  Yes Historical Provider, MD   Cholecalciferol (Vitamin D-3) 125 MCG (5000 UT) TABS Take 1 tablet by mouth once a week   Yes Historical Provider, MD   dapsone 25 mg tablet Take 2 tablets (50 mg total) by mouth daily 6/3/24 7/3/24 Yes Idalia Stuart MD   folic acid (FOLVITE) 1 mg tablet Take 1 mg by mouth daily 8/4/16 2/28/25 Yes Historical Provider, MD   gabapentin (NEURONTIN) 100 mg capsule Take 1 capsule (100 mg total) by mouth 2 (two) times a day 6/2/24 7/2/24 Yes Idalia Stuart MD   hydrocortisone 1 % cream Apply topically 2 (two) times a day 6/2/24 7/2/24 Yes Idalia Stuart MD   hydroxychloroquine (PLAQUENIL) 200 mg tablet Take 200 mg by mouth 2 (two) times a day with meals   Yes Historical Provider, MD   lactulose (CHRONULAC) 10 g/15 mL solution Take 30 mL (20 g total) by mouth 4 (four) times a day Goal bowel movements is 4 a day 6/18/24 7/18/24 Yes Kristyn Rodriguez PA-C   polyethylene glycol (MIRALAX) 17 g packet Take 17 g by mouth daily  Patient taking differently: Take 17 g by mouth if needed (constipation) 6/19/24  Yes Kristyn Rodriguez PA-C   sertraline (ZOLOFT) 100 mg tablet Take 1 tablet (100 mg total) by mouth daily 6/3/24  Yes Idalia Stuart MD   spironolactone (ALDACTONE) 50 mg tablet Take 1 tablet (50 mg total) by mouth daily 6/3/24 7/3/24 Yes Idalia Stuart MD   torsemide (DEMADEX) 20 mg tablet Take  "1 tablet (20 mg total) by mouth 2 (two) times a day before meals 6/2/24 7/2/24 Yes Idalia Stuart MD   acetaminophen (TYLENOL) 325 mg tablet Take 2 tablets (650 mg total) by mouth every 6 (six) hours as needed for fever, mild pain or headaches 6/2/24 7/2/24  Idalia Stuart MD   Ergocalciferol (VITAMIN D2 PO) Take 1 capsule by mouth once a week Mondays 7/2/24  Historical Provider, MD   Xifaxan 550 MG tablet Take 550 mg by mouth 2 (two) times a day  Patient not taking: Reported on 7/2/2024 3/26/24 7/2/24  Historical Provider, MD     I have reviewed home medications with patient personally.    Allergies: No Known Allergies    Social History:     Marital Status: /Civil Union     Social History     Substance and Sexual Activity   Alcohol Use Never     Social History     Tobacco Use   Smoking Status Never   Smokeless Tobacco Never     Social History     Substance and Sexual Activity   Drug Use Never       Family History:    Family History   Problem Relation Age of Onset    Hypertension Father        Physical Exam:     Vitals:   Blood Pressure: 135/83 (07/02/24 1304)  Pulse: 68 (07/02/24 1304)  Temperature: 97.5 °F (36.4 °C) (07/02/24 1304)  Temp Source: Oral (07/02/24 1029)  Respirations: 18 (07/02/24 1304)  Height: 5' 5\" (165.1 cm) (07/02/24 1332)  Weight - Scale: 122 kg (269 lb 12.8 oz) (07/02/24 1349)  SpO2: 90 % (07/02/24 1304)    Physical Exam  Vitals and nursing note reviewed.   Constitutional:       Appearance: Normal appearance.   HENT:      Head: Normocephalic and atraumatic.      Right Ear: External ear normal.      Left Ear: External ear normal.      Nose: Nose normal.      Mouth/Throat:      Pharynx: Oropharynx is clear.   Eyes:      Pupils: Pupils are equal, round, and reactive to light.   Cardiovascular:      Rate and Rhythm: Normal rate and regular rhythm.      Heart sounds: Normal heart sounds.   Pulmonary:      Effort: Pulmonary effort is normal.      Breath sounds: Rales present.   Abdominal:      " General: Bowel sounds are normal. There is distension.      Palpations: Abdomen is soft.      Tenderness: There is no abdominal tenderness.   Musculoskeletal:         General: Normal range of motion.      Cervical back: Normal range of motion and neck supple.      Right lower leg: Edema present.      Left lower leg: Edema present.   Skin:     General: Skin is warm and dry.      Capillary Refill: Capillary refill takes less than 2 seconds.   Neurological:      General: No focal deficit present.      Mental Status: She is alert and oriented to person, place, and time.   Psychiatric:         Mood and Affect: Mood normal.           Additional Data:     Lab Results: I have personally reviewed pertinent reports.      Results from last 7 days   Lab Units 07/02/24  1054   WBC Thousand/uL 3.45*   HEMOGLOBIN g/dL 11.9   HEMATOCRIT % 35.8   PLATELETS Thousands/uL 93*     Results from last 7 days   Lab Units 07/02/24  1054   SODIUM mmol/L 141   POTASSIUM mmol/L 2.8*   CHLORIDE mmol/L 102   CO2 mmol/L 32   BUN mg/dL 7   CREATININE mg/dL 0.78   ANION GAP mmol/L 7   CALCIUM mg/dL 8.6   ALBUMIN g/dL 3.4*   TOTAL BILIRUBIN mg/dL 1.61*   ALK PHOS U/L 199*   ALT U/L 25   AST U/L 42*   GLUCOSE RANDOM mg/dL 58*         Results from last 7 days   Lab Units 07/02/24  1215   POC GLUCOSE mg/dl 91               Imaging: I have personally reviewed pertinent reports.      XR chest 1 view portable   ED Interpretation by Cheng Ivy DO (07/02 1139)   No acute findings.           EKG, Pathology, and Other Studies Reviewed on Admission:   EKG: reviewed    [facility] Yes     ** Please Note: This note has been constructed using a voice recognition system. **

## 2024-07-02 NOTE — ASSESSMENT & PLAN NOTE
She has known history of liver cirrhosis now presents with ammonia level of 89  She states she was taking lactulose however stopped Xifaxan at home as she was not having much response from that.  Recommended to her to continue both medications and resume both and recheck ammonia level in a.m.

## 2024-07-02 NOTE — CASE MANAGEMENT
Case Management Assessment & Discharge Planning Note    Patient name Bailey Olsen  Location /-01 MRN [id number]  : 1970 Date 2024       Current Admission Date: 2024  Current Admission Diagnosis:Acute on chronic diastolic (congestive) heart failure (HCC)   Patient Active Problem List    Diagnosis Date Noted Date Diagnosed    Hepatic encephalopathy (HCC) 2024     Fall 2024     Other cirrhosis of liver (HCC) 2024     History of malignant carcinoid tumor of small intestine 2024     Class 3 severe obesity due to excess calories in adult (HCC) 2024     Primary hypertension 2024     Anxiety 2024     Hypokalemia 2024     Fibromyalgia 2024     Iron deficiency anemia 2024     Pancytopenia (HCC) 2024     Sarcoidosis 2024     Acute on chronic diastolic (congestive) heart failure (HCC) 2024     Bilateral pulmonary infiltrates 2024       LOS (days): 0  Geometric Mean LOS (GMLOS) (days):   Days to GMLOS:     OBJECTIVE:  PATIENT READMITTED TO HOSPITAL  Risk of Unplanned Readmission Score: 17.4         Current admission status: Inpatient  Referral Reason: Other (readmission)    Preferred Pharmacy:   [facility] [address] [place] [address] [address] [facility]  [address] [facility] [address]  Phone: [phone] Fax: [phone]    Primary Care Provider: Nayely Brown DO    Primary Insurance: [facility]  Secondary Insurance:     ASSESSMENT:  Active Health Care Proxies    There are no active Health Care Proxies on file.       Advance Directives  Does patient have a Health Care POA?: No  Was patient offered paperwork?: Yes (given at bedside)  Does patient currently have a Health Care decision maker?: Yes, please see Health Care Proxy section  Does patient have Advance Directives?: No  Was patient offered paperwork?: Yes (given at bedside)  Primary Contact: Cristian (Spouse)         Janet  [facility]  30 Day Readmission: Yes  Who directed you to return to the hospital?: Self  Did you understand whom to contact if you had questions or problems?: Yes  Did you get your prescriptions before you left the hospital?: No  Reason:: Preference for own pharmacy  Were you able to get your prescriptions filled when you left the hospital?: Yes  Did you take your medications as prescribed?: Yes  Were you able to get to your follow-up appointments?: Yes  During previous admission, was a post-acute recommendation made?: Yes  What post-acute resources were offered?: STR, Offered, but declined  Patient was readmitted due to: CHF, leg swelling, weight gain  Action Plan: lasix IV, ck amonia    Patient Information  Admitted from:: Home  Mental Status: Alert  During Assessment patient was accompanied by: Not accompanied during assessment  Assessment information provided by:: Patient  Primary Caregiver: Spouse  Caregiver's Name:: Cristian Olsen - spouse  Caregiver's Relationship to Patient:: Significant Other  Caregiver's Telephone Number:: [phone]  Support Systems: Self, Children, Spouse/significant other, Family members  [facility] Residence: [facility]  What city do you live in?: [place]  Home entry access options. Select all that apply.: Stairs  Number of steps to enter home.: 1  Do the steps have railings?: Yes  Type of Current Residence: [address] [place] [address] home  Upon entering residence, is there a bedroom on the main floor (no further steps)?: Yes  Upon entering residence, is there a bathroom on the main floor (no further steps)?: Yes  Living Arrangements: Lives w/ Spouse/significant other  Is patient a ?: No    Activities of Daily Living Prior to Admission  Functional Status: Assistance  Completes ADLs independently?: No (spouse providers supervison at bathing and helps with dressing)  Level of ADL dependence: Assistance  Ambulates independently?: Yes  Does patient use assisted devices?: Yes  Assisted Devices (DME)  used: Walker  Does patient currently own DME?: Yes  What DME does the patient currently own?: Walker  Does patient have a history of Outpatient Therapy (PT/OT)?: Yes ([facility])  Does the patient have a history of Short-Term Rehab?: Yes ([facility], [facility])  Does patient have a history of HHC?: Yes ([facility])  Does patient currently have HHC?: Yes    Current Home Health Care  Type of Current Home Care Services: Home PT, Home OT, Nurse visit  Current [facility] Health Agency:: [facility]  Current Home Health Follow-Up Provider:: PCP    Patient Information Continued  Income Source: SSI/SSD  Does patient have prescription coverage?: Yes  Does patient receive dialysis treatments?: No  Does patient have a history of substance abuse?: No  Does patient have a history of Mental Health Diagnosis?: Yes (OCD)  Is patient receiving treatment for mental health?: Yes ([facility] by PCP)  Has patient received inpatient treatment related to mental health in the last 2 years?: No         Means of Transportation  Means of Transport to Appts:: Family transport      Social Determinants of Health (SDOH)      Flowsheet Row Most Recent Value   Housing Stability    In the last 12 months, was there a time when you were not able to pay the mortgage or rent on time? N   At any time in the past 12 months, were you homeless or living in a shelter (including now)? N   Transportation Needs    In the past 12 months, has lack of transportation kept you from medical appointments or from getting medications? no   In the past 12 months, has lack of transportation kept you from meetings, work, or from getting things needed for daily living? No   Food Insecurity    Within the past 12 months, you worried that your food would run out before you got the money to buy more. Never true   Within the past 12 months, the food you bought just didn't last and you didn't have money to get more. Never true   Utilities    In the past 12 months has the electric, gas,  oil, or water company threatened to shut off services in your home? No            DISCHARGE DETAILS:    Discharge planning discussed with:: Patient  Freedom of Choice: Yes  Comments - Freedom of Choice: BROOKE to [facility]  CM contacted family/caregiver?: No- see comments (Declined)  Were Treatment Team discharge recommendations reviewed with patient/caregiver?: Yes  Did patient/caregiver verbalize understanding of patient care needs?: Yes  Were patient/caregiver advised of the risks associated with not following Treatment Team discharge recommendations?: Yes                   Other Referral/Resources/Interventions Provided:  Interventions: [facility]  Referral Comments: BROOKE [facility]    Would you like to participate in our [facility] service program?  : No - Declined           CM met with patient at the bedside,baseline information  was obtained. CM discussed the role of CM in helping the patient develop a discharge plan and assist the patient in carry out their plan.  CM discussed patient's discharge plan - BROOKE with [facility] at discharge.     CM submitted an early referral for a BROOKE to [facility] in [facility].     CM to follow patient's care and discharge needs.

## 2024-07-02 NOTE — PLAN OF CARE
Problem: PAIN - ADULT  Goal: Verbalizes/displays adequate comfort level or baseline comfort level  Description: Interventions:  - Encourage patient to monitor pain and request assistance  - Assess pain using appropriate pain scale  - Administer analgesics based on type and severity of pain and evaluate response  - Implement non-pharmacological measures as appropriate and evaluate response  - Consider cultural and social influences on pain and pain management  - Notify physician/advanced practitioner if interventions unsuccessful or patient reports new pain  Outcome: Progressing     Problem: INFECTION - ADULT  Goal: Absence or prevention of progression during hospitalization  Description: INTERVENTIONS:  - Assess and monitor for signs and symptoms of infection  - Monitor lab/diagnostic results  - Monitor all insertion sites, i.e. indwelling lines, tubes, and drains  - Monitor endotracheal if appropriate and nasal secretions for changes in amount and color  - Freeman appropriate cooling/warming therapies per order  - Administer medications as ordered  - Instruct and encourage patient and family to use good hand hygiene technique  - Identify and instruct in appropriate isolation precautions for identified infection/condition  Outcome: Progressing     Problem: SAFETY ADULT  Goal: Patient will remain free of falls  Description: INTERVENTIONS:  - Educate patient/family on patient safety including physical limitations  - Instruct patient to call for assistance with activity   - Consult OT/PT to assist with strengthening/mobility   - Keep Call bell within reach  - Keep bed low and locked with side rails adjusted as appropriate  - Keep care items and personal belongings within reach  - Initiate and maintain comfort rounds  - Make Fall Risk Sign visible to staff  - Offer Toileting every  Hours, in advance of need  - Initiate/Maintain alarm  - Obtain necessary fall risk management equipment:   - Apply yellow socks and  bracelet for high fall risk patients  - Consider moving patient to room near nurses station  Outcome: Progressing  Goal: Maintain or return to baseline ADL function  Description: INTERVENTIONS:  -  Assess patient's ability to carry out ADLs; assess patient's baseline for ADL function and identify physical deficits which impact ability to perform ADLs (bathing, care of mouth/teeth, toileting, grooming, dressing, etc.)  - Assess/evaluate cause of self-care deficits   - Assess range of motion  - Assess patient's mobility; develop plan if impaired  - Assess patient's need for assistive devices and provide as appropriate  - Encourage maximum independence but intervene and supervise when necessary  - Involve family in performance of ADLs  - Assess for home care needs following discharge   - Consider OT consult to assist with ADL evaluation and planning for discharge  - Provide patient education as appropriate  Outcome: Progressing  Goal: Maintains/Returns to pre admission functional level  Description: INTERVENTIONS:  - Perform AM-PAC 6 Click Basic Mobility/ Daily Activity assessment daily.  - Set and communicate daily mobility goal to care team and patient/family/caregiver.   - Collaborate with rehabilitation services on mobility goals if consulted  - Perform Range of Motion  times a day.  - Reposition patient every  hours.  - Dangle patient  times a day  - Stand patient times a day  - Ambulate patient  times a day  - Out of bed to chair  times a day   - Out of bed for meals  times a day  - Out of bed for toileting  - Record patient progress and toleration of activity level   Outcome: Progressing     Problem: DISCHARGE PLANNING  Goal: Discharge to home or other facility with appropriate resources  Description: INTERVENTIONS:  - Identify barriers to discharge w/patient and caregiver  - Arrange for needed discharge resources and transportation as appropriate  - Identify discharge learning needs (meds, wound care, etc.)  -  Arrange for interpretive services to assist at discharge as needed  - Refer to Case Management Department for coordinating discharge planning if the patient needs post-hospital services based on physician/advanced practitioner order or complex needs related to functional status, cognitive ability, or social support system  Outcome: Progressing     Problem: Knowledge Deficit  Goal: Patient/family/caregiver demonstrates understanding of disease process, treatment plan, medications, and discharge instructions  Description: Complete learning assessment and assess knowledge base.  Interventions:  - Provide teaching at level of understanding  - Provide teaching via preferred learning methods  Outcome: Progressing     Problem: CARDIOVASCULAR - ADULT  Goal: Maintains optimal cardiac output and hemodynamic stability  Description: INTERVENTIONS:  - Monitor I/O, vital signs and rhythm  - Monitor for S/S and trends of decreased cardiac output  - Administer and titrate ordered vasoactive medications to optimize hemodynamic stability  - Assess quality of pulses, skin color and temperature  - Assess for signs of decreased coronary artery perfusion  - Instruct patient to report change in severity of symptoms  Outcome: Progressing  Goal: Absence of cardiac dysrhythmias or at baseline rhythm  Description: INTERVENTIONS:  - Continuous cardiac monitoring, vital signs, obtain 12 lead EKG if ordered  - Administer antiarrhythmic and heart rate control medications as ordered  - Monitor electrolytes and administer replacement therapy as ordered  Outcome: Progressing     Problem: MOBILITY - ADULT  Goal: Maintain or return to baseline ADL function  Description: INTERVENTIONS:  -  Assess patient's ability to carry out ADLs; assess patient's baseline for ADL function and identify physical deficits which impact ability to perform ADLs (bathing, care of mouth/teeth, toileting, grooming, dressing, etc.)  - Assess/evaluate cause of self-care  deficits   - Assess range of motion  - Assess patient's mobility; develop plan if impaired  - Assess patient's need for assistive devices and provide as appropriate  - Encourage maximum independence but intervene and supervise when necessary  - Involve family in performance of ADLs  - Assess for home care needs following discharge   - Consider OT consult to assist with ADL evaluation and planning for discharge  - Provide patient education as appropriate  Outcome: Progressing  Goal: Maintains/Returns to pre admission functional level  Description: INTERVENTIONS:  - Perform AM-PAC 6 Click Basic Mobility/ Daily Activity assessment daily.  - Set and communicate daily mobility goal to care team and patient/family/caregiver.   - Collaborate with rehabilitation services on mobility goals if consulted  - Perform Range of Motion  times a day.  - Reposition patient every  hours.  - Dangle patient  times a day  - Stand patient  times a day  - Ambulate patient  times a day  - Out of bed to chair  times a day   - Out of bed for meals  times a day  - Out of bed for toileting  - Record patient progress and toleration of activity level   Outcome: Progressing

## 2024-07-02 NOTE — ASSESSMENT & PLAN NOTE
Wt Readings from Last 3 Encounters:   07/02/24 125 kg (274 lb 14.6 oz)   06/18/24 117 kg (258 lb 9.6 oz)   06/02/24 119 kg (263 lb 0.1 oz)     Patient states that she was taking torsemide and Aldactone at home but despite that she was gaining weight again and got concerned her weight today is 274 pounds on her previous discharge was down to 258 pounds.  Hold oral torsemide placed on Lasix 60 mg IV twice daily continue Aldactone orally.  Replace potassium and magnesium and continue diuresis  2D echo done 05/2024 was normal EF

## 2024-07-03 LAB
AMMONIA PLAS-SCNC: 78 UMOL/L (ref 18–72)
ANION GAP SERPL CALCULATED.3IONS-SCNC: 6 MMOL/L (ref 4–13)
BUN SERPL-MCNC: 9 MG/DL (ref 5–25)
CALCIUM SERPL-MCNC: 8.7 MG/DL (ref 8.4–10.2)
CHLORIDE SERPL-SCNC: 101 MMOL/L (ref 96–108)
CO2 SERPL-SCNC: 32 MMOL/L (ref 21–32)
CREAT SERPL-MCNC: 0.89 MG/DL (ref 0.6–1.3)
GFR SERPL CREATININE-BSD FRML MDRD: 73 ML/MIN/1.73SQ M
GLUCOSE SERPL-MCNC: 133 MG/DL (ref 65–140)
MAGNESIUM SERPL-MCNC: 2 MG/DL (ref 1.9–2.7)
POTASSIUM SERPL-SCNC: 3.3 MMOL/L (ref 3.5–5.3)
SODIUM SERPL-SCNC: 139 MMOL/L (ref 135–147)

## 2024-07-03 PROCEDURE — 99232 SBSQ HOSP IP/OBS MODERATE 35: CPT | Performed by: FAMILY MEDICINE

## 2024-07-03 PROCEDURE — 80048 BASIC METABOLIC PNL TOTAL CA: CPT | Performed by: FAMILY MEDICINE

## 2024-07-03 PROCEDURE — 83735 ASSAY OF MAGNESIUM: CPT | Performed by: FAMILY MEDICINE

## 2024-07-03 PROCEDURE — 82140 ASSAY OF AMMONIA: CPT | Performed by: FAMILY MEDICINE

## 2024-07-03 RX ORDER — POTASSIUM CHLORIDE 20 MEQ/1
40 TABLET, EXTENDED RELEASE ORAL ONCE
Status: COMPLETED | OUTPATIENT
Start: 2024-07-03 | End: 2024-07-03

## 2024-07-03 RX ORDER — FUROSEMIDE 10 MG/ML
80 INJECTION INTRAMUSCULAR; INTRAVENOUS 2 TIMES DAILY
Status: DISCONTINUED | OUTPATIENT
Start: 2024-07-03 | End: 2024-07-03

## 2024-07-03 RX ORDER — PROCHLORPERAZINE MALEATE 10 MG
5 TABLET ORAL EVERY 6 HOURS PRN
Status: DISCONTINUED | OUTPATIENT
Start: 2024-07-03 | End: 2024-07-08 | Stop reason: HOSPADM

## 2024-07-03 RX ORDER — HYDROXYCHLOROQUINE SULFATE 200 MG/1
200 TABLET, FILM COATED ORAL 2 TIMES DAILY WITH MEALS
Status: DISCONTINUED | OUTPATIENT
Start: 2024-07-03 | End: 2024-07-08 | Stop reason: HOSPADM

## 2024-07-03 RX ORDER — LACTULOSE 10 G/15ML
30 SOLUTION ORAL 3 TIMES DAILY
Status: DISCONTINUED | OUTPATIENT
Start: 2024-07-03 | End: 2024-07-04

## 2024-07-03 RX ORDER — FUROSEMIDE 10 MG/ML
80 INJECTION INTRAMUSCULAR; INTRAVENOUS 2 TIMES DAILY
Status: DISCONTINUED | OUTPATIENT
Start: 2024-07-03 | End: 2024-07-04

## 2024-07-03 RX ADMIN — OXYCODONE HYDROCHLORIDE 10 MG: 10 TABLET ORAL at 13:41

## 2024-07-03 RX ADMIN — RIFAXIMIN 550 MG: 550 TABLET ORAL at 08:50

## 2024-07-03 RX ADMIN — FUROSEMIDE 80 MG: 10 INJECTION, SOLUTION INTRAMUSCULAR; INTRAVENOUS at 08:51

## 2024-07-03 RX ADMIN — POTASSIUM CHLORIDE 40 MEQ: 1500 TABLET, EXTENDED RELEASE ORAL at 09:59

## 2024-07-03 RX ADMIN — FOLIC ACID 1 MG: 1 TABLET ORAL at 08:52

## 2024-07-03 RX ADMIN — HYDROXYCHLOROQUINE SULFATE 200 MG: 200 TABLET, FILM COATED ORAL at 15:52

## 2024-07-03 RX ADMIN — RIFAXIMIN 550 MG: 550 TABLET ORAL at 21:55

## 2024-07-03 RX ADMIN — GABAPENTIN 100 MG: 100 CAPSULE ORAL at 17:44

## 2024-07-03 RX ADMIN — SERTRALINE HYDROCHLORIDE 100 MG: 100 TABLET ORAL at 08:50

## 2024-07-03 RX ADMIN — LACTULOSE 30 G: 20 SOLUTION ORAL at 21:55

## 2024-07-03 RX ADMIN — OXYCODONE HYDROCHLORIDE 10 MG: 10 TABLET ORAL at 06:12

## 2024-07-03 RX ADMIN — OXYCODONE HYDROCHLORIDE 10 MG: 10 TABLET ORAL at 20:03

## 2024-07-03 RX ADMIN — GABAPENTIN 100 MG: 100 CAPSULE ORAL at 08:50

## 2024-07-03 RX ADMIN — PROCHLORPERAZINE MALEATE 5 MG: 10 TABLET ORAL at 10:23

## 2024-07-03 RX ADMIN — FUROSEMIDE 80 MG: 10 INJECTION, SOLUTION INTRAMUSCULAR; INTRAVENOUS at 17:44

## 2024-07-03 RX ADMIN — LACTULOSE 20 G: 20 SOLUTION ORAL at 08:52

## 2024-07-03 RX ADMIN — SPIRONOLACTONE 50 MG: 25 TABLET ORAL at 08:50

## 2024-07-03 RX ADMIN — AMITRIPTYLINE HYDROCHLORIDE 75 MG: 25 TABLET, FILM COATED ORAL at 21:55

## 2024-07-03 RX ADMIN — LACTULOSE 30 G: 20 SOLUTION ORAL at 15:52

## 2024-07-03 RX ADMIN — HYDROXYCHLOROQUINE SULFATE 200 MG: 200 TABLET, FILM COATED ORAL at 09:59

## 2024-07-03 NOTE — UTILIZATION REVIEW
Initial Clinical Review    Admission: Date/Time/Statement:   Admission Orders (From admission, onward)       Ordered        07/02/24 1211  INPATIENT ADMISSION  Once                          Orders Placed This Encounter   Procedures    INPATIENT ADMISSION     Standing Status:   Standing     Number of Occurrences:   1     Order Specific Question:   Level of Care     Answer:   Med Surg [16]     Order Specific Question:   Estimated length of stay     Answer:   More than 2 Midnights     Order Specific Question:   Certification     Answer:   I certify that inpatient services are medically necessary for this patient for a duration of greater than two midnights. See H&P and MD Progress Notes for additional information about the patient's course of treatment.     ED Arrival Information       Expected   -    Arrival   7/2/2024 10:20    Acuity   Emergent              Means of arrival   Walk-In    Escorted by   Family Member    Service   Hospitalist    Admission type   Emergency              Arrival complaint   Fluid Retention (hx of CHF)             Chief Complaint   Patient presents with    Shortness of Breath     Pt. Voiced that she has hx of CHF and has been retaining fluid for the past couple of days.       Initial Presentation: 53 y.o. female to ED via walk-in from home  Present to ED with  not feeling well complaining of worsening lower extremity edema abdominal distention which is progressively getting worse for the last 2 weeks.  She is gained 50 to 20 pounds.  Endorses shortness of breath with exertion   PMHX: CHF; cirrhosis of liver; 2D echo done 05/2024 was normal EF   Admitted to [facility] with DX: Acute on chronic diastolic (congestive) heart failure   on exam: lungs with rales; abdominal distension; B/L LE edema; K 2.8; Albumin 3.4; ammonia 89; weight today is 274 pounds on her previous discharge was down to 258 pounds.   PLAN: cont iv lasix; pain / nausea control; monitor labs; f/u CXR      Anticipated Length of  Stay/Certification Statement: Patient will be admitted on an Inpatient basis with an anticipated length of stay of more than 2 midnights.   Justification for Hospital Stay: Acute on chronic diastolic CHF       Date: 7/3/24      Day 2  Patient states that her urine is quite dark she is not peeing as much as she needs to and still has a lot of swelling. Exam: I/O net +170; Urine output low - increase lasix to 80 mg; K 3.3; ammonia 78  Plan: cont iv lasix; pain / nausea control; monitor labs; f/u CXR; continue lactulose      ED Triage Vitals [07/02/24 1029]   Temperature Pulse Respirations Blood Pressure SpO2 Pain Score   98.6 °F (37 °C) 90 18 156/81 98 % 6     Weight (last 2 days)       Date/Time Weight    07/03/24 0847 123 (271.6)     Weight: after pt ate at 07/03/24 0847    07/03/24 0600 123 (271.6)    07/02/24 1349 122 (269.8)    07/02/24 1332 125 (274.91)    07/02/24 1029 125 (275.13)            Vital Signs (last 3 days)       Date/Time Temp Pulse Resp BP MAP (mmHg) SpO2 O2 Device Patient Position - Orthostatic VS Pain    07/03/24 07:39:50 97.2 °F (36.2 °C) 88 18 122/75 91 92 % -- -- --    07/03/24 0700 -- -- -- -- -- -- None (Room air) -- No Pain    07/03/24 0612 -- -- -- -- -- -- -- -- 7    07/02/24 22:40:43 97.2 °F (36.2 °C) 96 -- 124/82 96 87 % -- -- --    07/02/24 2154 -- -- -- -- -- 92 % None (Room air) -- 8 07/02/24 2150 -- -- -- -- -- -- -- -- 8 07/02/24 19:03:51 97.5 °F (36.4 °C) 92 -- 123/75 91 90 % -- -- --    07/02/24 1517 -- -- -- -- -- -- -- -- 8    07/02/24 14:56:01 97.5 °F (36.4 °C) 98 18 159/88 112 100 % -- -- --    07/02/24 1331 -- -- -- -- -- -- None (Room air) -- No Pain    07/02/24 1316 -- -- -- -- -- -- -- -- 6    07/02/24 13:04:09 97.5 °F (36.4 °C) 68 18 135/83 100 90 % -- -- --    07/02/24 1211 -- -- -- -- -- -- -- -- 7    07/02/24 1029 98.6 °F (37 °C) 90 18 156/81 -- 98 % None (Room air) Lying 6              Pertinent Labs/Diagnostic Test Results:   Radiology:  XR chest 1 view  portable   ED Interpretation by Cheng Ivy DO (07/02 1139)   No acute findings.         Cardiology:  ECG 12 lead   Final Result by Keanu Mcneil MD (07/02 7941)   Normal sinus rhythm   Minimal voltage criteria for LVH, may be normal variant ( R in aVL )   Borderline ECG   When compared with ECG of 11-JUN-2024 11:51,   No significant change was found   Confirmed by Keanu Mcneil ([id number]) on 7/2/2024 2:50:59 PM           Results from last 7 days   Lab Units 07/02/24  1054   WBC Thousand/uL 3.45*   HEMOGLOBIN g/dL 11.9   HEMATOCRIT % 35.8   PLATELETS Thousands/uL 93*        Results from last 7 days   Lab Units 07/03/24  0505 07/02/24  1852 07/02/24  1054   SODIUM mmol/L 139 138 141   POTASSIUM mmol/L 3.3* 3.4* 2.8*   CHLORIDE mmol/L 101 103 102   CO2 mmol/L 32 29 32   ANION GAP mmol/L 6 6 7   BUN mg/dL 9 7 7   CREATININE mg/dL 0.89 0.87 0.78   EGFR ml/min/1.73sq m 73 75 86   CALCIUM mg/dL 8.7 8.3* 8.6   MAGNESIUM mg/dL 2.0  --  1.5*     Results from last 7 days   Lab Units 07/03/24  0505 07/02/24  1054   AST U/L  --  42*   ALT U/L  --  25   ALK PHOS U/L  --  199*   TOTAL PROTEIN g/dL  --  8.0   ALBUMIN g/dL  --  3.4*   TOTAL BILIRUBIN mg/dL  --  1.61*   BILIRUBIN DIRECT mg/dL  --  0.44*   AMMONIA umol/L 78* 89*     Results from last 7 days   Lab Units 07/02/24  1215   POC GLUCOSE mg/dl 91     Results from last 7 days   Lab Units 07/03/24  0505 07/02/24  1852 07/02/24  1054   GLUCOSE RANDOM mg/dL 133 134 58*        Results from last 7 days   Lab Units 07/02/24  1054   BNP pg/mL 34          ED Treatment-Medication Administration from 07/02/2024 1019 to 07/02/2024 1256         Date/Time Order Dose Route Action     07/02/2024 1141 magnesium sulfate 2 g/50 mL IVPB (premix) 2 g 2 g Intravenous New Bag     07/02/2024 1141 potassium chloride (Klor-Con M20) CR tablet 40 mEq 40 mEq Oral Given     07/02/2024 1142 dextrose 50 % IV solution 25 mL 25 mL Intravenous Given     07/02/2024 1211 oxyCODONE (ROXICODONE) immediate  release tablet 10 mg 10 mg Oral Given            Past Medical History:   Diagnosis Date    Carcinoid tumor     neuroendocrine    CHF (congestive heart failure) (HCC)     Cirrhosis of liver (HCC)      Present on Admission:   Acute on chronic diastolic (congestive) heart failure (HCC)   Other cirrhosis of liver (HCC)   Primary hypertension   Hypokalemia   Pancytopenia (HCC)      Admitting Diagnosis: Acute on chronic diastolic heart failure (HCC) [I50.33]  Hypokalemia [E87.6]  SOB (shortness of breath) [R06.02]  History of cirrhosis of liver [Z87.19]  Bilateral lower extremity edema [R60.0]    Age/Sex: 53 y.o. female    Admission Orders: SCDs; I/o; Daily wts; cardiac diet; fluid restriction 1800    Scheduled Medications:  amitriptyline, 75 mg, Oral, HS  folic acid, 1 mg, Oral, Daily  furosemide, 80 mg, Intravenous, BID  gabapentin, 100 mg, Oral, BID  hydroxychloroquine, 200 mg, Oral, BID With Meals  lactulose, 30 g, Oral, TID  rifaximin, 550 mg, Oral, Q12H MIGUEL  sertraline, 100 mg, Oral, Daily  spironolactone, 50 mg, Oral, Daily      Continuous IV Infusions: none       PRN Meds:  oxyCODONE, 10 mg, Oral, Q6H PRN (7/2 recd x2)  (7/3 recd x1 so far today)         IP CONSULT TO NUTRITION SERVICES    Network Utilization Review Department  ATTENTION: Please call with any questions or concerns to [phone] and carefully listen to the prompts so that you are directed to the right person. All voicemails are confidential.   For Discharge needs, contact Care Management DC Support Team at [phone] opt. 2  Send all requests for admission clinical reviews, approved or denied determinations and any other requests to dedicated fax number below belonging to the campus where the patient is receiving treatment. List of dedicated fax numbers for the Facilities:  FACILITY NAME UR FAX NUMBER   ADMISSION DENIALS (Administrative/Medical Necessity) [phone]   DISCHARGE SUPPORT TEAM (NETWORK) [phone]   [facility] CHILD [facility]  (Maternity/NICU/Pediatrics) [phone]   [facility] [phone]   [facility] [phone]   [facility] [phone]   [facility] [phone]   [facility] [phone]   [facility] [phone]   [facility] [phone]   [facility] [phone]   [facility] [phone]   [facility] [phone]   [facility] [phone]   [facility] [phone]

## 2024-07-03 NOTE — CONSULTS
"Consult received for CHF Ed.     Pt somnolent during time of visit \"i'm so tired\" keeping eyes closed most of conversation. Reports eating fruit for breakfast most mornings at home, did not describe lunch/dinner meals though reports to be adhering to a low sodium diet. Did receive diet instruction during recent previous admission. ?compliance to fluid restriction, when discussing current fluid restriction with pt she stated \"That's so low!\" Discussed importance of fluid restriction, defer volume to MD. Pt declined nutrition handouts, did review low sodium and fluid restricted handouts previously. Continue diet as ordered. Will provide further diet ed as desired by pt.   "

## 2024-07-03 NOTE — PROGRESS NOTES
[facility]  Progress Note  Name: Bailey Olsen I  MRN: [id number]  Unit/Bed#: MS Nasc[facility] I Date of Admission: 7/2/2024   Date of Service: 7/3/2024 I Hospital Day: 1    Assessment & Plan   * Acute on chronic diastolic (congestive) heart failure (HCC)  Assessment & Plan  Wt Readings from Last 3 Encounters:   07/03/24 123 kg (271 lb 9.6 oz)   06/18/24 117 kg (258 lb 9.6 oz)   06/02/24 119 kg (263 lb 0.1 oz)     Patient states that she was taking torsemide and Aldactone at home but despite that she was gaining weight again and got concerned her weight today is 271 pounds on her previous discharge was down to 252-258 pounds.  Hold oral torsemide placed on Lasix 60 mg IV twice daily continue Aldactone orally.  Replace potassium and magnesium and continue diuresis  2D echo done 05/2024 was normal EF  Urine output low . Increase lasix to 80 mg iv bid and cont to replace and monitor potassium          Hypokalemia  Assessment & Plan  K is 3.4 today.will replace and monitor daily with diuresis    Other cirrhosis of liver (HCC)  Assessment & Plan  She has known history of liver cirrhosis now presents with ammonia level of 89  She states she was taking lactulose however stopped Xifaxan at home as she was not having much response from that.  Recommended to her to continue both medications and resume both and recheck ammonia level in a.m.  Lactulose to 30 g oral 3 times daily for now    Pancytopenia (HCC)  Assessment & Plan  Patient has chronic pancytopenia due to liver issues.  Patient has a sepsis alert however she does not have sepsis or SIRS at this time.  No acute infection and this is all secondary to her chronic liver disease    Primary hypertension  Assessment & Plan  blood pressures currently well-controlled.cont diuresis               VTE Pharmacologic Prophylaxis: VTE Score: 1 Moderate Risk (Score 3-4) - Pharmacological DVT Prophylaxis Contraindicated. Sequential Compression Devices  Ordered.    Mobility:   Basic Mobility Inpatient Raw Score: 22  JH-HLM Goal: 7: Walk 25 feet or more  JH-HLM Achieved: 7: Walk 25 feet or more  JH-HLM Goal achieved. Continue to encourage appropriate mobility.    Patient Centered Rounds: I performed bedside rounds with nursing staff today.   Discussions with Specialists or Other Care Team Provider: none    Education and Discussions with Family / Patient: none    Total Time Spent on Date of Encounter in care of patient: 35 mins. This time was spent on one or more of the following: performing physical exam; counseling and coordination of care; obtaining or reviewing history; documenting in the medical record; reviewing/ordering tests, medications or procedures; communicating with other healthcare professionals and discussing with patient's family/caregivers.    Current Length of Stay: 1 day(s)  Current Patient Status: Inpatient   Certification Statement: The patient will continue to require additional inpatient hospital stay due to acute on chronic diastolic CHF  Discharge Plan: Anticipate discharge in 48-72 hrs to home.    Code Status: Level 1 - Full Code    Subjective:   Patient states that her urine is quite dark she is not peeing as much as she needs to and still has a lot of swelling.  Is awake alert and oriented and talking appropriately    Objective:     Vitals:   Temp (24hrs), Av.6 °F (36.4 °C), Min:97.2 °F (36.2 °C), Max:98.6 °F (37 °C)    Temp:  [97.2 °F (36.2 °C)-98.6 °F (37 °C)] 97.2 °F (36.2 °C)  HR:  [68-98] 88  Resp:  [18] 18  BP: (122-159)/(75-88) 122/75  SpO2:  [87 %-100 %] 92 %  Body mass index is 45.2 kg/m².     Input and Output Summary (last 24 hours):     Intake/Output Summary (Last 24 hours) at 7/3/2024 0910  Last data filed at 7/3/2024 0848  Gross per 24 hour   Intake 1070 ml   Output 900 ml   Net 170 ml       Physical Exam:   Physical Exam  Vitals and nursing note reviewed.   Constitutional:       Appearance: Normal appearance.   HENT:       Head: Normocephalic and atraumatic.      Right Ear: External ear normal.      Left Ear: External ear normal.      Nose: Nose normal.      Mouth/Throat:      Pharynx: Oropharynx is clear.   Cardiovascular:      Rate and Rhythm: Normal rate and regular rhythm.      Heart sounds: Normal heart sounds.   Pulmonary:      Effort: Pulmonary effort is normal.      Breath sounds: Normal breath sounds.   Abdominal:      General: Bowel sounds are normal.      Palpations: Abdomen is soft.      Tenderness: There is no abdominal tenderness.   Musculoskeletal:         General: Normal range of motion.      Cervical back: Normal range of motion and neck supple.      Right lower leg: Edema present.      Left lower leg: Edema present.   Skin:     General: Skin is warm and dry.      Capillary Refill: Capillary refill takes less than 2 seconds.   Neurological:      General: No focal deficit present.      Mental Status: She is alert and oriented to person, place, and time.   Psychiatric:         Mood and Affect: Mood normal.            Additional Data:     Labs:  Results from last 7 days   Lab Units 07/02/24  1054   WBC Thousand/uL 3.45*   HEMOGLOBIN g/dL 11.9   HEMATOCRIT % 35.8   PLATELETS Thousands/uL 93*     Results from last 7 days   Lab Units 07/03/24  0505 07/02/24  1852 07/02/24  1054   SODIUM mmol/L 139   < > 141   POTASSIUM mmol/L 3.3*   < > 2.8*   CHLORIDE mmol/L 101   < > 102   CO2 mmol/L 32   < > 32   BUN mg/dL 9   < > 7   CREATININE mg/dL 0.89   < > 0.78   ANION GAP mmol/L 6   < > 7   CALCIUM mg/dL 8.7   < > 8.6   ALBUMIN g/dL  --   --  3.4*   TOTAL BILIRUBIN mg/dL  --   --  1.61*   ALK PHOS U/L  --   --  199*   ALT U/L  --   --  25   AST U/L  --   --  42*   GLUCOSE RANDOM mg/dL 133   < > 58*    < > = values in this interval not displayed.         Results from last 7 days   Lab Units 07/02/24  1215   POC GLUCOSE mg/dl 91               Lines/Drains:  Invasive Devices       Peripheral Intravenous Line  Duration              Peripheral IV 07/02/24 Left Antecubital <1 day                          Imaging: Reviewed radiology reports from this admission including: chest xray    Recent Cultures (last 7 days):         Last 24 Hours Medication List:   Current Facility-Administered Medications   Medication Dose Route Frequency Provider Last Rate    amitriptyline  75 mg Oral HS Idalia Stuart MD      folic acid  1 mg Oral Daily Idalia Stuart MD      furosemide  80 mg Intravenous BID Idalia Stuart MD      gabapentin  100 mg Oral BID Idalia Stuart MD      hydroxychloroquine  200 mg Oral BID With Meals Idalia Stuart MD      lactulose  30 g Oral TID Idalia Stuart MD      oxyCODONE  10 mg Oral Q6H PRN Idalia Stuart MD      potassium chloride  40 mEq Oral Once Idalia Stuart MD      rifaximin  550 mg Oral Q12H MIGUEL Idalia Stuart MD      sertraline  100 mg Oral Daily Idalia Stuart MD      spironolactone  50 mg Oral Daily Idalia Stuart MD          Today, Patient Was Seen By: Idalia Staurt MD    **Please Note: This note may have been constructed using a voice recognition system.**

## 2024-07-03 NOTE — PROGRESS NOTES
Patient:    MRN:  [id number]    Sindy Request ID:  [id number]    Level of care reserved:  Home Health Agency    Partner Reserved:  [place] [phone]    Clinical needs requested:    Geography searched:  [id number]    Start of Service:    Request sent:  4:29pm EDT on 7/2/2024 by Anastasiya Rondon    Partner reserved:  8:05am EDT on 7/3/2024 by Anastasiya Rondon    Choice list shared:  8:05am EDT on 7/3/2024 by Anastasiya Rondon

## 2024-07-03 NOTE — ASSESSMENT & PLAN NOTE
Wt Readings from Last 3 Encounters:   07/03/24 123 kg (271 lb 9.6 oz)   06/18/24 117 kg (258 lb 9.6 oz)   06/02/24 119 kg (263 lb 0.1 oz)     Patient states that she was taking torsemide and Aldactone at home but despite that she was gaining weight again and got concerned her weight today is 271 pounds on her previous discharge was down to 252-258 pounds.  Hold oral torsemide placed on Lasix 60 mg IV twice daily continue Aldactone orally.  Replace potassium and magnesium and continue diuresis  2D echo done 05/2024 was normal EF  Urine output low . Increase lasix to 80 mg iv bid and cont to replace and monitor potassium

## 2024-07-03 NOTE — PLAN OF CARE
Problem: PAIN - ADULT  Goal: Verbalizes/displays adequate comfort level or baseline comfort level  Description: Interventions:  - Encourage patient to monitor pain and request assistance  - Assess pain using appropriate pain scale  - Administer analgesics based on type and severity of pain and evaluate response  - Implement non-pharmacological measures as appropriate and evaluate response  - Consider cultural and social influences on pain and pain management  - Notify physician/advanced practitioner if interventions unsuccessful or patient reports new pain  Outcome: Progressing     Problem: INFECTION - ADULT  Goal: Absence or prevention of progression during hospitalization  Description: INTERVENTIONS:  - Assess and monitor for signs and symptoms of infection  - Monitor lab/diagnostic results  - Monitor all insertion sites, i.e. indwelling lines, tubes, and drains  - Monitor endotracheal if appropriate and nasal secretions for changes in amount and color  - [place] appropriate cooling/warming therapies per order  - Administer medications as ordered  - Instruct and encourage patient and family to use good hand hygiene technique  - Identify and instruct in appropriate isolation precautions for identified infection/condition  Outcome: Progressing     Problem: SAFETY ADULT  Goal: Patient will remain free of falls  Description: INTERVENTIONS:  - Educate patient/family on patient safety including physical limitations  - Instruct patient to call for assistance with activity   - Consult OT/PT to assist with strengthening/mobility   - Keep Call bell within reach  - Keep bed low and locked with side rails adjusted as appropriate  - Keep care items and personal belongings within reach  - Initiate and maintain comfort rounds  - Make Fall Risk Sign visible to staff  - Offer Toileting every 3 Hours, in advance of need  - Initiate/Maintain bed alarm  - Obtain necessary fall risk management equipment  - Apply yellow socks and  bracelet for high fall risk patients  - Consider moving patient to room near nurses station  Outcome: Progressing  Goal: Maintain or return to baseline ADL function  Description: INTERVENTIONS:  -  Assess patient's ability to carry out ADLs; assess patient's baseline for ADL function and identify physical deficits which impact ability to perform ADLs (bathing, care of mouth/teeth, toileting, grooming, dressing, etc.)  - Assess/evaluate cause of self-care deficits   - Assess range of motion  - Assess patient's mobility; develop plan if impaired  - Assess patient's need for assistive devices and provide as appropriate  - Encourage maximum independence but intervene and supervise when necessary  - Involve family in performance of ADLs  - Assess for home care needs following discharge   - Consider OT consult to assist with ADL evaluation and planning for discharge  - Provide patient education as appropriate  Outcome: Progressing  Goal: Maintains/Returns to pre admission functional level  Description: INTERVENTIONS:  - Perform AM-PAC 6 Click Basic Mobility/ Daily Activity assessment daily.  - Set and communicate daily mobility goal to care team and patient/family/caregiver.   - Collaborate with rehabilitation services on mobility goals if consulted  - Perform Range of Motion 3 times a day.  - Reposition patient every 2 hours.  - Dangle patient 3 times a day  - Stand patient 3 times a day  - Ambulate patient 3 times a day  - Out of bed to chair 3 times a day   - Out of bed for meals 3 times a day  - Out of bed for toileting  - Record patient progress and toleration of activity level   Outcome: Progressing     Problem: DISCHARGE PLANNING  Goal: Discharge to home or other facility with appropriate resources  Description: INTERVENTIONS:  - Identify barriers to discharge w/patient and caregiver  - Arrange for needed discharge resources and transportation as appropriate  - Identify discharge learning needs (meds, wound care,  etc.)  - Arrange for interpretive services to assist at discharge as needed  - Refer to Case Management Department for coordinating discharge planning if the patient needs post-hospital services based on physician/advanced practitioner order or complex needs related to functional status, cognitive ability, or social support system  Outcome: Progressing     Problem: Knowledge Deficit  Goal: Patient/family/caregiver demonstrates understanding of disease process, treatment plan, medications, and discharge instructions  Description: Complete learning assessment and assess knowledge base.  Interventions:  - Provide teaching at level of understanding  - Provide teaching via preferred learning methods  Outcome: Progressing     Problem: CARDIOVASCULAR - ADULT  Goal: Maintains optimal cardiac output and hemodynamic stability  Description: INTERVENTIONS:  - Monitor I/O, vital signs and rhythm  - Monitor for S/S and trends of decreased cardiac output  - Administer and titrate ordered vasoactive medications to optimize hemodynamic stability  - Assess quality of pulses, skin color and temperature  - Assess for signs of decreased coronary artery perfusion  - Instruct patient to report change in severity of symptoms  Outcome: Progressing  Goal: Absence of cardiac dysrhythmias or at baseline rhythm  Description: INTERVENTIONS:  - Continuous cardiac monitoring, vital signs, obtain 12 lead EKG if ordered  - Administer antiarrhythmic and heart rate control medications as ordered  - Monitor electrolytes and administer replacement therapy as ordered  Outcome: Progressing     Problem: MOBILITY - ADULT  Goal: Maintain or return to baseline ADL function  Description: INTERVENTIONS:  -  Assess patient's ability to carry out ADLs; assess patient's baseline for ADL function and identify physical deficits which impact ability to perform ADLs (bathing, care of mouth/teeth, toileting, grooming, dressing, etc.)  - Assess/evaluate cause of  self-care deficits   - Assess range of motion  - Assess patient's mobility; develop plan if impaired  - Assess patient's need for assistive devices and provide as appropriate  - Encourage maximum independence but intervene and supervise when necessary  - Involve family in performance of ADLs  - Assess for home care needs following discharge   - Consider OT consult to assist with ADL evaluation and planning for discharge  - Provide patient education as appropriate  Outcome: Progressing  Goal: Maintains/Returns to pre admission functional level  Description: INTERVENTIONS:  - Perform AM-PAC 6 Click Basic Mobility/ Daily Activity assessment daily.  - Set and communicate daily mobility goal to care team and patient/family/caregiver.   - Collaborate with rehabilitation services on mobility goals if consulted  - Perform Range of Motion 3 times a day.  - Reposition patient every 2 hours.  - Dangle patient 3 times a day  - Stand patient 3 times a day  - Ambulate patient 3 times a day  - Out of bed to chair 3 times a day   - Out of bed for meals 3 times a day  - Out of bed for toileting  - Record patient progress and toleration of activity level   Outcome: Progressing

## 2024-07-03 NOTE — CASE MANAGEMENT
Case Management Discharge Planning Note    Patient name Bailey Olsen  Location /-01 MRN [id number]  : 1970 Date 7/3/2024       Current Admission Date: 2024  Current Admission Diagnosis:Acute on chronic diastolic (congestive) heart failure (HCC)   Patient Active Problem List    Diagnosis Date Noted Date Diagnosed    Hepatic encephalopathy (HCC) 2024     Fall 2024     Other cirrhosis of liver (HCC) 2024     History of malignant carcinoid tumor of small intestine 2024     Class 3 severe obesity due to excess calories in adult (HCC) 2024     Primary hypertension 2024     Anxiety 2024     Hypokalemia 2024     Fibromyalgia 2024     Iron deficiency anemia 2024     Pancytopenia (HCC) 2024     Sarcoidosis 2024     Acute on chronic diastolic (congestive) heart failure (HCC) 2024     Bilateral pulmonary infiltrates 2024       LOS (days): 1  Geometric Mean LOS (GMLOS) (days):   Days to GMLOS:     OBJECTIVE:  Risk of Unplanned Readmission Score: 17.56         Current admission status: Inpatient   Preferred Pharmacy:   [facility] [address] [place] [address] [address] [facility]  [address] [facility] [address]  Phone: [phone] Fax: [phone]    Primary Care Provider: Nayely Brown DO    Primary Insurance: [facility]  Secondary Insurance:     DISCHARGE DETAILS:                                Requested Home Health Care         Is the patient interested in HHC at discharge?: Yes  Home Health Discipline requested:: Nursing, Occupational Therapy, Physical Therapy  [facility] Reason (only applicable if external H[facility] name selected): Patient has established relationship with provider  Home Health Follow-Up Provider:: PCP  Home Health Services Needed:: Gait/ADL Training, Strengthening/Theraputic Exercises to Improve Function, Evaluate Functional Status  and Safety, Heart Failure Management  Homebound Criteria Met:: Uses an Assist Device (i.e. cane, walker, etc), Requires the Assistance of Another Person for Safe Ambulation or to Leave the Home  Supporting Clincal Findings:: Limited Endurance    DME Referral Provided  Referral made for DME?: No    Other Referral/Resources/Interventions Provided:  Interventions: HHC  Referral Comments: BROOKE Perkins         Treatment Team Recommendation: Home with Home Health Care  Discharge Destination Plan:: Home with Home Health Care  Transport at Discharge : Family             CM deborah Perkins in [place] for [facility] for [facility].     CM to follow patient's care and discharge needs.

## 2024-07-03 NOTE — PLAN OF CARE
Problem: PAIN - ADULT  Goal: Verbalizes/displays adequate comfort level or baseline comfort level  Description: Interventions:  - Encourage patient to monitor pain and request assistance  - Assess pain using appropriate pain scale  - Administer analgesics based on type and severity of pain and evaluate response  - Implement non-pharmacological measures as appropriate and evaluate response  - Consider cultural and social influences on pain and pain management  - Notify physician/advanced practitioner if interventions unsuccessful or patient reports new pain  Outcome: Progressing     Problem: INFECTION - ADULT  Goal: Absence or prevention of progression during hospitalization  Description: INTERVENTIONS:  - Assess and monitor for signs and symptoms of infection  - Monitor lab/diagnostic results  - Monitor all insertion sites, i.e. indwelling lines, tubes, and drains  - Monitor endotracheal if appropriate and nasal secretions for changes in amount and color  - [place] appropriate cooling/warming therapies per order  - Administer medications as ordered  - Instruct and encourage patient and family to use good hand hygiene technique  - Identify and instruct in appropriate isolation precautions for identified infection/condition  Outcome: Progressing     Problem: SAFETY ADULT  Goal: Patient will remain free of falls  Description: INTERVENTIONS:  - Educate patient/family on patient safety including physical limitations  - Instruct patient to call for assistance with activity   - Consult OT/PT to assist with strengthening/mobility   - Keep Call bell within reach  - Keep bed low and locked with side rails adjusted as appropriate  - Keep care items and personal belongings within reach  - Initiate and maintain comfort rounds  - Make Fall Risk Sign visible to staff  - Offer Toileting every  Hours, in advance of need  - Initiate/Maintain alarm  - Obtain necessary fall risk management equipment:     - Apply yellow socks and  bracelet for high fall risk patients  - Consider moving patient to room near nurses station  Outcome: Progressing  Goal: Maintain or return to baseline ADL function  Description: INTERVENTIONS:  -  Assess patient's ability to carry out ADLs; assess patient's baseline for ADL function and identify physical deficits which impact ability to perform ADLs (bathing, care of mouth/teeth, toileting, grooming, dressing, etc.)  - Assess/evaluate cause of self-care deficits   - Assess range of motion  - Assess patient's mobility; develop plan if impaired  - Assess patient's need for assistive devices and provide as appropriate  - Encourage maximum independence but intervene and supervise when necessary  - Involve family in performance of ADLs  - Assess for home care needs following discharge   - Consider OT consult to assist with ADL evaluation and planning for discharge  - Provide patient education as appropriate  Outcome: Progressing  Goal: Maintains/Returns to pre admission functional level  Description: INTERVENTIONS:  - Perform AM-PAC 6 Click Basic Mobility/ Daily Activity assessment daily.  - Set and communicate daily mobility goal to care team and patient/family/caregiver.   - Collaborate with rehabilitation services on mobility goals if consulted  - Perform Range of Motion  times a day.  - Reposition patient every  hours.  - Dangle patient  times a day  - Stand patient  times a day  - Ambulate patient  times a day  - Out of bed to chair  times a day   - Out of bed for meals  times a day  - Out of bed for toileting  - Record patient progress and toleration of activity level   Outcome: Progressing     Problem: DISCHARGE PLANNING  Goal: Discharge to home or other facility with appropriate resources  Description: INTERVENTIONS:  - Identify barriers to discharge w/patient and caregiver  - Arrange for needed discharge resources and transportation as appropriate  - Identify discharge learning needs (meds, wound care, etc.)  -  Arrange for interpretive services to assist at discharge as needed  - Refer to Case Management Department for coordinating discharge planning if the patient needs post-hospital services based on physician/advanced practitioner order or complex needs related to functional status, cognitive ability, or social support system  Outcome: Progressing     Problem: Knowledge Deficit  Goal: Patient/family/caregiver demonstrates understanding of disease process, treatment plan, medications, and discharge instructions  Description: Complete learning assessment and assess knowledge base.  Interventions:  - Provide teaching at level of understanding  - Provide teaching via preferred learning methods  Outcome: Progressing     Problem: CARDIOVASCULAR - ADULT  Goal: Maintains optimal cardiac output and hemodynamic stability  Description: INTERVENTIONS:  - Monitor I/O, vital signs and rhythm  - Monitor for S/S and trends of decreased cardiac output  - Administer and titrate ordered vasoactive medications to optimize hemodynamic stability  - Assess quality of pulses, skin color and temperature  - Assess for signs of decreased coronary artery perfusion  - Instruct patient to report change in severity of symptoms  Outcome: Progressing  Goal: Absence of cardiac dysrhythmias or at baseline rhythm  Description: INTERVENTIONS:  - Continuous cardiac monitoring, vital signs, obtain 12 lead EKG if ordered  - Administer antiarrhythmic and heart rate control medications as ordered  - Monitor electrolytes and administer replacement therapy as ordered  Outcome: Progressing     Problem: MOBILITY - ADULT  Goal: Maintain or return to baseline ADL function  Description: INTERVENTIONS:  -  Assess patient's ability to carry out ADLs; assess patient's baseline for ADL function and identify physical deficits which impact ability to perform ADLs (bathing, care of mouth/teeth, toileting, grooming, dressing, etc.)  - Assess/evaluate cause of self-care  deficits   - Assess range of motion  - Assess patient's mobility; develop plan if impaired  - Assess patient's need for assistive devices and provide as appropriate  - Encourage maximum independence but intervene and supervise when necessary  - Involve family in performance of ADLs  - Assess for home care needs following discharge   - Consider OT consult to assist with ADL evaluation and planning for discharge  - Provide patient education as appropriate  Outcome: Progressing  Goal: Maintains/Returns to pre admission functional level  Description: INTERVENTIONS:  - Perform AM-PAC 6 Click Basic Mobility/ Daily Activity assessment daily.  - Set and communicate daily mobility goal to care team and patient/family/caregiver.   - Collaborate with rehabilitation services on mobility goals if consulted  - Perform Range of Motion  times a day.  - Reposition patient every  hours.  - Dangle patient  times a day  - Stand patient  times a day  - Ambulate patient  times a day  - Out of bed to chair  times a day   - Out of bed for meals  times a day  - Out of bed for toileting  - Record patient progress and toleration of activity level   Outcome: Progressing

## 2024-07-03 NOTE — SEPSIS NOTE
No sepsis Note   Bailey Olsen 53 y.o. female MRN: [id number]  Unit/Bed#: -01 Encounter: [id number]       Initial Sepsis Screening       Row Name 07/03/24 0308                Is the patient's history suggestive of a new or worsening infection? No  -CL                  User Key  (r) = Recorded By, (t) = Taken By, (c) = Cosigned By      Initials Name Provider Type    CL Angelito Borjas DO Physician                  Notified by nursing patient positive on sepsis screening.  Mild tachycardia present on admission.  Leukopenia related to pancytopenia/cirrhosis.  No evidence of acute infection to qualify for sepsis at this time.

## 2024-07-03 NOTE — ASSESSMENT & PLAN NOTE
She has known history of liver cirrhosis now presents with ammonia level of 89  She states she was taking lactulose however stopped Xifaxan at home as she was not having much response from that.  Recommended to her to continue both medications and resume both and recheck ammonia level in a.m.  Lactulose to 30 g oral 3 times daily for now

## 2024-07-04 LAB
AMMONIA PLAS-SCNC: 124 UMOL/L (ref 18–72)
ANION GAP SERPL CALCULATED.3IONS-SCNC: 7 MMOL/L (ref 4–13)
BUN SERPL-MCNC: 9 MG/DL (ref 5–25)
CALCIUM SERPL-MCNC: 8.8 MG/DL (ref 8.4–10.2)
CHLORIDE SERPL-SCNC: 99 MMOL/L (ref 96–108)
CO2 SERPL-SCNC: 31 MMOL/L (ref 21–32)
CREAT SERPL-MCNC: 0.84 MG/DL (ref 0.6–1.3)
GFR SERPL CREATININE-BSD FRML MDRD: 79 ML/MIN/1.73SQ M
GLUCOSE SERPL-MCNC: 126 MG/DL (ref 65–140)
MAGNESIUM SERPL-MCNC: 1.9 MG/DL (ref 1.9–2.7)
POTASSIUM SERPL-SCNC: 3.5 MMOL/L (ref 3.5–5.3)
SODIUM SERPL-SCNC: 137 MMOL/L (ref 135–147)

## 2024-07-04 PROCEDURE — 83735 ASSAY OF MAGNESIUM: CPT | Performed by: FAMILY MEDICINE

## 2024-07-04 PROCEDURE — 82140 ASSAY OF AMMONIA: CPT | Performed by: FAMILY MEDICINE

## 2024-07-04 PROCEDURE — 99232 SBSQ HOSP IP/OBS MODERATE 35: CPT | Performed by: INTERNAL MEDICINE

## 2024-07-04 PROCEDURE — 80048 BASIC METABOLIC PNL TOTAL CA: CPT | Performed by: FAMILY MEDICINE

## 2024-07-04 RX ORDER — BUMETANIDE 0.25 MG/ML
1 INJECTION INTRAMUSCULAR; INTRAVENOUS 2 TIMES DAILY
Status: DISCONTINUED | OUTPATIENT
Start: 2024-07-04 | End: 2024-07-08

## 2024-07-04 RX ORDER — ENOXAPARIN SODIUM 100 MG/ML
40 INJECTION SUBCUTANEOUS
Status: DISCONTINUED | OUTPATIENT
Start: 2024-07-04 | End: 2024-07-08 | Stop reason: HOSPADM

## 2024-07-04 RX ORDER — POLYETHYLENE GLYCOL 3350 17 G/17G
17 POWDER, FOR SOLUTION ORAL ONCE
Status: COMPLETED | OUTPATIENT
Start: 2024-07-04 | End: 2024-07-04

## 2024-07-04 RX ORDER — LACTULOSE 10 G/15ML
30 SOLUTION ORAL 4 TIMES DAILY
Status: DISCONTINUED | OUTPATIENT
Start: 2024-07-04 | End: 2024-07-07

## 2024-07-04 RX ADMIN — SERTRALINE HYDROCHLORIDE 100 MG: 100 TABLET ORAL at 08:28

## 2024-07-04 RX ADMIN — OXYCODONE HYDROCHLORIDE 10 MG: 10 TABLET ORAL at 10:32

## 2024-07-04 RX ADMIN — BUMETANIDE 1 MG: 0.25 INJECTION INTRAMUSCULAR; INTRAVENOUS at 11:17

## 2024-07-04 RX ADMIN — HYDROXYCHLOROQUINE SULFATE 200 MG: 200 TABLET, FILM COATED ORAL at 17:06

## 2024-07-04 RX ADMIN — AMITRIPTYLINE HYDROCHLORIDE 75 MG: 25 TABLET, FILM COATED ORAL at 21:42

## 2024-07-04 RX ADMIN — LACTULOSE 30 G: 20 SOLUTION ORAL at 08:27

## 2024-07-04 RX ADMIN — RIFAXIMIN 550 MG: 550 TABLET ORAL at 08:28

## 2024-07-04 RX ADMIN — ENOXAPARIN SODIUM 40 MG: 40 INJECTION SUBCUTANEOUS at 11:17

## 2024-07-04 RX ADMIN — FOLIC ACID 1 MG: 1 TABLET ORAL at 08:28

## 2024-07-04 RX ADMIN — PROCHLORPERAZINE MALEATE 5 MG: 10 TABLET ORAL at 10:32

## 2024-07-04 RX ADMIN — BUMETANIDE 1 MG: 0.25 INJECTION INTRAMUSCULAR; INTRAVENOUS at 17:06

## 2024-07-04 RX ADMIN — HYDROXYCHLOROQUINE SULFATE 200 MG: 200 TABLET, FILM COATED ORAL at 08:28

## 2024-07-04 RX ADMIN — OXYCODONE HYDROCHLORIDE 10 MG: 10 TABLET ORAL at 04:20

## 2024-07-04 RX ADMIN — SPIRONOLACTONE 50 MG: 25 TABLET ORAL at 08:28

## 2024-07-04 RX ADMIN — LACTULOSE 30 G: 20 SOLUTION ORAL at 17:05

## 2024-07-04 RX ADMIN — LACTULOSE 30 G: 20 SOLUTION ORAL at 21:42

## 2024-07-04 RX ADMIN — POLYETHYLENE GLYCOL 3350 17 G: 17 POWDER, FOR SOLUTION ORAL at 11:17

## 2024-07-04 RX ADMIN — OXYCODONE HYDROCHLORIDE 10 MG: 10 TABLET ORAL at 17:05

## 2024-07-04 RX ADMIN — PROCHLORPERAZINE MALEATE 5 MG: 10 TABLET ORAL at 17:05

## 2024-07-04 RX ADMIN — GABAPENTIN 100 MG: 100 CAPSULE ORAL at 08:28

## 2024-07-04 RX ADMIN — GABAPENTIN 100 MG: 100 CAPSULE ORAL at 17:05

## 2024-07-04 RX ADMIN — RIFAXIMIN 550 MG: 550 TABLET ORAL at 20:36

## 2024-07-04 RX ADMIN — FUROSEMIDE 80 MG: 10 INJECTION, SOLUTION INTRAMUSCULAR; INTRAVENOUS at 08:27

## 2024-07-04 NOTE — PLAN OF CARE
Problem: INFECTION - ADULT  Goal: Absence or prevention of progression during hospitalization  Description: INTERVENTIONS:  - Assess and monitor for signs and symptoms of infection  - Monitor lab/diagnostic results  - Monitor all insertion sites, i.e. indwelling lines, tubes, and drains  - Monitor endotracheal if appropriate and nasal secretions for changes in amount and color  - [place] appropriate cooling/warming therapies per order  - Administer medications as ordered  - Instruct and encourage patient and family to use good hand hygiene technique  - Identify and instruct in appropriate isolation precautions for identified infection/condition  Outcome: Progressing

## 2024-07-04 NOTE — PROGRESS NOTES
[facility]  Progress Note  Name: Bailey Olsen I  MRN: [id number]  Unit/Bed#: [facility]-01 I Date of Admission: 7/2/2024   Date of Service: 7/4/2024 I Hospital Day: 2    Assessment & Plan   * Acute on chronic diastolic (congestive) heart failure (HCC)  Assessment & Plan  Wt Readings from Last 3 Encounters:   07/04/24 122 kg (270 lb)   06/18/24 117 kg (258 lb 9.6 oz)   06/02/24 119 kg (263 lb 0.1 oz)     Patient states that she was taking torsemide and Aldactone at home but despite that she was gaining weight again and got concerned her weight today is 271 pounds on her previous discharge was down to 252-258 pounds.  Hold oral torsemide placed on Lasix 60 mg IV twice daily continue Aldactone orally.  Replace potassium and magnesium and continue diuresis Lasix dose was increased to 80 mg IV twice daily yesterday however no significant diuretic response and no changes in standing weight.  Will discontinue Lasix and switch to Bumex 1 mg IV twice daily.  Continue to monitor daily standing weight and strict intake output .  2D echo done 05/2024 was normal EF            Pancytopenia (HCC)  Assessment & Plan  Patient has chronic pancytopenia due to liver issues.  Patient has a sepsis alert however she does not have sepsis or SIRS at this time.  No acute infection and this is all secondary to her chronic liver disease    Hypokalemia  Assessment & Plan  Replace.  Continue to monitor electrolytes closely on current diuretics.    Primary hypertension  Assessment & Plan  blood pressures currently well-controlled.cont diuresis    Other cirrhosis of liver (HCC)  Assessment & Plan  She has known history of liver cirrhosis now presents with ammonia level of 89  She states she was taking lactulose however stopped Xifaxan at home as she was not having much response from that.  Recommended to her to continue both medications and resume both and recheck ammonia level in a.m.  Lactulose to 30 g oral increased to  4 times daily.  Has not had any bowel movements in last 24 hours.  Will give 1 additional dose of MiraLAX as well.  Ammonia level has increased however mentation remains the same.               VTE Pharmacologic Prophylaxis: VTE Score: 1 High Risk (Score >/= 5) - Pharmacological DVT Prophylaxis Ordered: enoxaparin (Lovenox). Sequential Compression Devices Ordered.    Mobility:   Basic Mobility Inpatient Raw Score: 22  JH-HLM Goal: 7: Walk 25 feet or more  JH-HLM Achieved: 7: Walk 25 feet or more  JH-HLM Goal achieved. Continue to encourage appropriate mobility.    Patient Centered Rounds: I performed bedside rounds with nursing staff today.   Discussions with Specialists or Other Care Team Provider: Discussed with nursing    Education and Discussions with Family / Patient: Updated  () via phone.    Total Time Spent on Date of Encounter in care of patient: 20 mins. This time was spent on one or more of the following: performing physical exam; counseling and coordination of care; obtaining or reviewing history; documenting in the medical record; reviewing/ordering tests, medications or procedures; communicating with other healthcare professionals and discussing with patient's family/caregivers.    Current Length of Stay: 2 day(s)  Current Patient Status: Inpatient   Certification Statement: The patient will continue to require additional inpatient hospital stay due to ongoing IV diuretics  Discharge Plan: Anticipate discharge in 24-48 hrs to home.    Code Status: Level 1 - Full Code    Subjective:   Seen and examined at bedside.  No bowel movements in the last 24 hours despite increased dose of lactulose.  Rate still remains the same with no significant diuretic response noted on current dose of IV diuretics.  Continues to have lower extremity swelling.  Denies any confusion or worsening abdominal discomfort.    Objective:     Vitals:   Temp (24hrs), Av.5 °F (36.4 °C), Min:97.3 °F (36.3 °C),  Max:97.7 °F (36.5 °C)    Temp:  [97.3 °F (36.3 °C)-97.7 °F (36.5 °C)] 97.3 °F (36.3 °C)  HR:  [] 95  Resp:  [18-20] 20  BP: (128-158)/(74-98) 158/97  SpO2:  [83 %-95 %] 93 %  Body mass index is 44.93 kg/m².     Input and Output Summary (last 24 hours):     Intake/Output Summary (Last 24 hours) at 7/4/2024 1031  Last data filed at 7/4/2024 1002  Gross per 24 hour   Intake 820 ml   Output 2600 ml   Net -1780 ml       Physical Exam:   Physical Exam  Constitutional:       General: She is not in acute distress.     Appearance: She is obese.   HENT:      Head: Normocephalic.      Nose: Nose normal.      Mouth/Throat:      Mouth: Mucous membranes are moist.   Eyes:      Extraocular Movements: Extraocular movements intact.      Pupils: Pupils are equal, round, and reactive to light.   Cardiovascular:      Rate and Rhythm: Normal rate and regular rhythm.   Pulmonary:      Effort: Pulmonary effort is normal.      Breath sounds: Normal breath sounds.   Abdominal:      General: There is distension.      Palpations: Abdomen is soft.      Tenderness: There is no abdominal tenderness. There is no guarding.   Musculoskeletal:      Cervical back: Neck supple.      Right lower leg: Edema present.      Left lower leg: Edema present.   Neurological:      General: No focal deficit present.      Mental Status: She is alert and oriented to person, place, and time. Mental status is at baseline.          Additional Data:     Labs:  Results from last 7 days   Lab Units 07/02/24  1054   WBC Thousand/uL 3.45*   HEMOGLOBIN g/dL 11.9   HEMATOCRIT % 35.8   PLATELETS Thousands/uL 93*     Results from last 7 days   Lab Units 07/04/24  0440 07/02/24  1852 07/02/24  1054   SODIUM mmol/L 137   < > 141   POTASSIUM mmol/L 3.5   < > 2.8*   CHLORIDE mmol/L 99   < > 102   CO2 mmol/L 31   < > 32   BUN mg/dL 9   < > 7   CREATININE mg/dL 0.84   < > 0.78   ANION GAP mmol/L 7   < > 7   CALCIUM mg/dL 8.8   < > 8.6   ALBUMIN g/dL  --   --  3.4*   TOTAL  BILIRUBIN mg/dL  --   --  1.61*   ALK PHOS U/L  --   --  199*   ALT U/L  --   --  25   AST U/L  --   --  42*   GLUCOSE RANDOM mg/dL 126   < > 58*    < > = values in this interval not displayed.         Results from last 7 days   Lab Units 07/02/24  1215   POC GLUCOSE mg/dl 91               Lines/Drains:  Invasive Devices       Peripheral Intravenous Line  Duration             Peripheral IV 07/03/24 Left Antecubital <1 day                          Imaging: No pertinent imaging reviewed.    Recent Cultures (last 7 days):         Last 24 Hours Medication List:   Current Facility-Administered Medications   Medication Dose Route Frequency Provider Last Rate    amitriptyline  75 mg Oral HS Idalia Staurt MD      bumetanide  1 mg Intravenous BID Mar Oliva MD      folic acid  1 mg Oral Daily Idalia Stuart MD      gabapentin  100 mg Oral BID Idalia Stuart MD      hydroxychloroquine  200 mg Oral BID With Meals Idalia Stuart MD      lactulose  30 g Oral 4x Daily Mar Oliva MD      oxyCODONE  10 mg Oral Q6H PRN Idalia Stuart MD      polyethylene glycol  17 g Oral Once Mar Oliva MD      prochlorperazine  5 mg Oral Q6H PRN Idalia Stuart MD      rifaximin  550 mg Oral Q12H MIGUEL Idalia Stuart MD      sertraline  100 mg Oral Daily Idalia Stuart MD      spironolactone  50 mg Oral Daily Idalia Stuart MD          Today, Patient Was Seen By: Mar Oliva MD    **Please Note: This note may have been constructed using a voice recognition system.**

## 2024-07-04 NOTE — ASSESSMENT & PLAN NOTE
She has known history of liver cirrhosis now presents with ammonia level of 89  She states she was taking lactulose however stopped Xifaxan at home as she was not having much response from that.  Recommended to her to continue both medications and resume both and recheck ammonia level in a.m.  Lactulose to 30 g oral increased to 4 times daily.  Has not had any bowel movements in last 24 hours.  Will give 1 additional dose of MiraLAX as well.  Ammonia level has increased however mentation remains the same.

## 2024-07-04 NOTE — ASSESSMENT & PLAN NOTE
Wt Readings from Last 3 Encounters:   07/04/24 122 kg (270 lb)   06/18/24 117 kg (258 lb 9.6 oz)   06/02/24 119 kg (263 lb 0.1 oz)     Patient states that she was taking torsemide and Aldactone at home but despite that she was gaining weight again and got concerned her weight today is 271 pounds on her previous discharge was down to 252-258 pounds.  Hold oral torsemide placed on Lasix 60 mg IV twice daily continue Aldactone orally.  Replace potassium and magnesium and continue diuresis Lasix dose was increased to 80 mg IV twice daily yesterday however no significant diuretic response and no changes in standing weight.  Will discontinue Lasix and switch to Bumex 1 mg IV twice daily.  Continue to monitor daily standing weight and strict intake output .  2D echo done 05/2024 was normal EF

## 2024-07-04 NOTE — PLAN OF CARE
Problem: PAIN - ADULT  Goal: Verbalizes/displays adequate comfort level or baseline comfort level  Description: Interventions:  - Encourage patient to monitor pain and request assistance  - Assess pain using appropriate pain scale  - Administer analgesics based on type and severity of pain and evaluate response  - Implement non-pharmacological measures as appropriate and evaluate response  - Consider cultural and social influences on pain and pain management  - Notify physician/advanced practitioner if interventions unsuccessful or patient reports new pain  Outcome: Progressing     Problem: INFECTION - ADULT  Goal: Absence or prevention of progression during hospitalization  Description: INTERVENTIONS:  - Assess and monitor for signs and symptoms of infection  - Monitor lab/diagnostic results  - Monitor all insertion sites, i.e. indwelling lines, tubes, and drains  - Monitor endotracheal if appropriate and nasal secretions for changes in amount and color  - [place] appropriate cooling/warming therapies per order  - Administer medications as ordered  - Instruct and encourage patient and family to use good hand hygiene technique  - Identify and instruct in appropriate isolation precautions for identified infection/condition  Outcome: Progressing     Problem: SAFETY ADULT  Goal: Patient will remain free of falls  Description: INTERVENTIONS:  - Educate patient/family on patient safety including physical limitations  - Instruct patient to call for assistance with activity   - Consult OT/PT to assist with strengthening/mobility   - Keep Call bell within reach  - Keep bed low and locked with side rails adjusted as appropriate  - Keep care items and personal belongings within reach  - Initiate and maintain comfort rounds  - Make Fall Risk Sign visible to staff  - Apply yellow socks and bracelet for high fall risk patients  - Consider moving patient to room near nurses station  Outcome: Progressing  Goal: Maintain or  return to baseline ADL function  Description: INTERVENTIONS:  -  Assess patient's ability to carry out ADLs; assess patient's baseline for ADL function and identify physical deficits which impact ability to perform ADLs (bathing, care of mouth/teeth, toileting, grooming, dressing, etc.)  - Assess/evaluate cause of self-care deficits   - Assess range of motion  - Assess patient's mobility; develop plan if impaired  - Assess patient's need for assistive devices and provide as appropriate  - Encourage maximum independence but intervene and supervise when necessary  - Involve family in performance of ADLs  - Assess for home care needs following discharge   - Consider OT consult to assist with ADL evaluation and planning for discharge  - Provide patient education as appropriate  Outcome: Progressing  Goal: Maintains/Returns to pre admission functional level  Description: INTERVENTIONS:  - Perform AM-PAC 6 Click Basic Mobility/ Daily Activity assessment daily.  - Set and communicate daily mobility goal to care team and patient/family/caregiver.   - Collaborate with rehabilitation services on mobility goals if consulted  - Perform Range of Motion 3 times a day.  - Reposition patient every 2 hours.  - Dangle patient 3 times a day  - Stand patient 3 times a day  - Ambulate patient 3 times a day  - Out of bed to chair 3 times a day   - Out of bed for meals 3 times a day  - Out of bed for toileting  - Record patient progress and toleration of activity level   Outcome: Progressing     Problem: DISCHARGE PLANNING  Goal: Discharge to home or other facility with appropriate resources  Description: INTERVENTIONS:  - Identify barriers to discharge w/patient and caregiver  - Arrange for needed discharge resources and transportation as appropriate  - Identify discharge learning needs (meds, wound care, etc.)  - Arrange for interpretive services to assist at discharge as needed  - Refer to Case Management Department for coordinating  discharge planning if the patient needs post-hospital services based on physician/advanced practitioner order or complex needs related to functional status, cognitive ability, or social support system  Outcome: Progressing     Problem: Knowledge Deficit  Goal: Patient/family/caregiver demonstrates understanding of disease process, treatment plan, medications, and discharge instructions  Description: Complete learning assessment and assess knowledge base.  Interventions:  - Provide teaching at level of understanding  - Provide teaching via preferred learning methods  Outcome: Progressing     Problem: CARDIOVASCULAR - ADULT  Goal: Maintains optimal cardiac output and hemodynamic stability  Description: INTERVENTIONS:  - Monitor I/O, vital signs and rhythm  - Monitor for S/S and trends of decreased cardiac output  - Administer and titrate ordered vasoactive medications to optimize hemodynamic stability  - Assess quality of pulses, skin color and temperature  - Assess for signs of decreased coronary artery perfusion  - Instruct patient to report change in severity of symptoms  Outcome: Progressing  Goal: Absence of cardiac dysrhythmias or at baseline rhythm  Description: INTERVENTIONS:  - Continuous cardiac monitoring, vital signs, obtain 12 lead EKG if ordered  - Administer antiarrhythmic and heart rate control medications as ordered  - Monitor electrolytes and administer replacement therapy as ordered  Outcome: Progressing     Problem: MOBILITY - ADULT  Goal: Maintain or return to baseline ADL function  Description: INTERVENTIONS:  -  Assess patient's ability to carry out ADLs; assess patient's baseline for ADL function and identify physical deficits which impact ability to perform ADLs (bathing, care of mouth/teeth, toileting, grooming, dressing, etc.)  - Assess/evaluate cause of self-care deficits   - Assess range of motion  - Assess patient's mobility; develop plan if impaired  - Assess patient's need for assistive  devices and provide as appropriate  - Encourage maximum independence but intervene and supervise when necessary  - Involve family in performance of ADLs  - Assess for home care needs following discharge   - Consider OT consult to assist with ADL evaluation and planning for discharge  - Provide patient education as appropriate  Outcome: Progressing  Goal: Maintains/Returns to pre admission functional level  Description: INTERVENTIONS:  - Perform AM-PAC 6 Click Basic Mobility/ Daily Activity assessment daily.  - Set and communicate daily mobility goal to care team and patient/family/caregiver.   - Collaborate with rehabilitation services on mobility goals if consulted  - Perform Range of Motion 3 times a day.  - Reposition patient every 2 hours.  - Dangle patient 3 times a day  - Stand patient 3 times a day  - Ambulate patient 3 times a day  - Out of bed to chair 3 times a day   - Out of bed for meals 3 times a day  - Out of bed for toileting  - Record patient progress and toleration of activity level   Outcome: Progressing

## 2024-07-05 PROBLEM — E72.20 HYPERAMMONEMIA (HCC): Status: ACTIVE | Noted: 2024-07-05

## 2024-07-05 LAB
ANION GAP SERPL CALCULATED.3IONS-SCNC: 5 MMOL/L (ref 4–13)
BUN SERPL-MCNC: 12 MG/DL (ref 5–25)
CALCIUM SERPL-MCNC: 8.7 MG/DL (ref 8.4–10.2)
CHLORIDE SERPL-SCNC: 99 MMOL/L (ref 96–108)
CO2 SERPL-SCNC: 33 MMOL/L (ref 21–32)
CREAT SERPL-MCNC: 0.85 MG/DL (ref 0.6–1.3)
GFR SERPL CREATININE-BSD FRML MDRD: 77 ML/MIN/1.73SQ M
GLUCOSE SERPL-MCNC: 143 MG/DL (ref 65–140)
MAGNESIUM SERPL-MCNC: 1.8 MG/DL (ref 1.9–2.7)
POTASSIUM SERPL-SCNC: 3.2 MMOL/L (ref 3.5–5.3)
SODIUM SERPL-SCNC: 137 MMOL/L (ref 135–147)

## 2024-07-05 PROCEDURE — 83735 ASSAY OF MAGNESIUM: CPT | Performed by: INTERNAL MEDICINE

## 2024-07-05 PROCEDURE — 80048 BASIC METABOLIC PNL TOTAL CA: CPT | Performed by: INTERNAL MEDICINE

## 2024-07-05 PROCEDURE — 99232 SBSQ HOSP IP/OBS MODERATE 35: CPT | Performed by: INTERNAL MEDICINE

## 2024-07-05 RX ORDER — POTASSIUM CHLORIDE 20 MEQ/1
40 TABLET, EXTENDED RELEASE ORAL ONCE
Status: COMPLETED | OUTPATIENT
Start: 2024-07-05 | End: 2024-07-05

## 2024-07-05 RX ORDER — MAGNESIUM SULFATE 1 G/100ML
1 INJECTION INTRAVENOUS ONCE
Status: COMPLETED | OUTPATIENT
Start: 2024-07-05 | End: 2024-07-06

## 2024-07-05 RX ADMIN — BUMETANIDE 1 MG: 0.25 INJECTION INTRAMUSCULAR; INTRAVENOUS at 08:51

## 2024-07-05 RX ADMIN — OXYCODONE HYDROCHLORIDE 10 MG: 10 TABLET ORAL at 20:30

## 2024-07-05 RX ADMIN — PROCHLORPERAZINE MALEATE 5 MG: 10 TABLET ORAL at 04:29

## 2024-07-05 RX ADMIN — RIFAXIMIN 550 MG: 550 TABLET ORAL at 08:51

## 2024-07-05 RX ADMIN — LACTULOSE 30 G: 20 SOLUTION ORAL at 08:50

## 2024-07-05 RX ADMIN — ENOXAPARIN SODIUM 40 MG: 40 INJECTION SUBCUTANEOUS at 11:41

## 2024-07-05 RX ADMIN — SERTRALINE HYDROCHLORIDE 100 MG: 100 TABLET ORAL at 08:51

## 2024-07-05 RX ADMIN — LACTULOSE 30 G: 20 SOLUTION ORAL at 21:20

## 2024-07-05 RX ADMIN — PROCHLORPERAZINE MALEATE 5 MG: 10 TABLET ORAL at 20:30

## 2024-07-05 RX ADMIN — FOLIC ACID 1 MG: 1 TABLET ORAL at 08:51

## 2024-07-05 RX ADMIN — POTASSIUM CHLORIDE 40 MEQ: 1500 TABLET, EXTENDED RELEASE ORAL at 10:00

## 2024-07-05 RX ADMIN — HYDROXYCHLOROQUINE SULFATE 200 MG: 200 TABLET, FILM COATED ORAL at 08:51

## 2024-07-05 RX ADMIN — GABAPENTIN 100 MG: 100 CAPSULE ORAL at 08:51

## 2024-07-05 RX ADMIN — BUMETANIDE 1 MG: 0.25 INJECTION INTRAMUSCULAR; INTRAVENOUS at 17:11

## 2024-07-05 RX ADMIN — OXYCODONE HYDROCHLORIDE 10 MG: 10 TABLET ORAL at 04:29

## 2024-07-05 RX ADMIN — SPIRONOLACTONE 50 MG: 25 TABLET ORAL at 08:51

## 2024-07-05 RX ADMIN — AMITRIPTYLINE HYDROCHLORIDE 75 MG: 25 TABLET, FILM COATED ORAL at 21:20

## 2024-07-05 RX ADMIN — RIFAXIMIN 550 MG: 550 TABLET ORAL at 21:20

## 2024-07-05 RX ADMIN — LACTULOSE 30 G: 20 SOLUTION ORAL at 11:41

## 2024-07-05 RX ADMIN — MAGNESIUM SULFATE HEPTAHYDRATE 1 G: 1 INJECTION, SOLUTION INTRAVENOUS at 10:00

## 2024-07-05 RX ADMIN — LACTULOSE 30 G: 20 SOLUTION ORAL at 17:11

## 2024-07-05 RX ADMIN — OXYCODONE HYDROCHLORIDE 10 MG: 10 TABLET ORAL at 11:41

## 2024-07-05 RX ADMIN — GABAPENTIN 100 MG: 100 CAPSULE ORAL at 17:11

## 2024-07-05 RX ADMIN — HYDROXYCHLOROQUINE SULFATE 200 MG: 200 TABLET, FILM COATED ORAL at 15:58

## 2024-07-05 NOTE — UTILIZATION REVIEW
Continued Stay Review    Date: 7/4/24 & 7/5/24                    Current Patient Class: IP   Current Level of Care: MS    HPI:53 y.o. female initially admitted on 7/2/24 - DX:  Acute on chronic diastolic (congestive) heart failure / Hypokalemia / Other cirrhosis of liver     Date: 7/4/24   No bowel movements in the last 24 hours despite increased dose of lactulose.  Ammonia level has increased however mentation remains the same. no significant diuretic response noted on current dose of IV diuretics. Continues to have lower extremity swelling. I/O Net -1780; (+) abdominal distension.  Lasix dose was increased to 80 mg IV twice daily yesterday however no significant diuretic response and no changes in standing weight. Will discontinue Lasix and switch to Bumex 1 mg IV twice daily. Lactulose to 30 g oral increased to 4 times daily.     Plan: cont bumex iv; pain / nausea control (see below); monitor labs; cont lactulose    Date: 7/5/24   Currently mentation appears at baseline. Continue with current dose of lactulose. 1 bowel movement reported overnight. Denies any shortness of breath but still has significant lower extremity swelling. I/O net -855; continues with abdominal distension; continues with B/L LE edema; K 3.2  Plan: cont bumex iv; pain / nausea control (see below); monitor labs; cont lactulose      Vital Signs (last 3 days)       Date/Time Temp Pulse Resp BP MAP (mmHg) SpO2 O2 Device Patient Position - Orthostatic VS Pain    07/05/24 07:29:12 97.5 °F (36.4 °C) 89 18 131/73 92 93 % -- -- --    07/05/24 0429 -- -- -- -- -- -- -- -- 8    07/04/24 22:35:52 97.5 °F (36.4 °C) 95 19 121/66 84 91 % -- -- --    07/04/24 2046 -- -- -- -- -- -- None (Room air) -- No Pain    07/04/24 1705 -- -- -- -- -- -- -- -- 9    07/04/24 14:27:05 97.7 °F (36.5 °C) 105 18 158/94 115 90 % -- -- --    07/04/24 1032 -- -- -- -- -- -- None (Room air) -- 7    07/04/24 07:06:43 97.3 °F (36.3 °C) 95 20 158/97 117 93 % -- -- --    07/04/24  0420 -- -- -- -- -- -- -- -- 7 07/03/24 21:42:47 97.5 °F (36.4 °C) 109 -- 158/98 118 83 % -- -- --    07/03/24 2100 -- -- -- -- -- 94 % None (Room air) -- --    07/03/24 2004 -- -- -- -- -- -- None (Room air) -- 7 07/03/24 2003 -- -- -- -- -- -- -- -- 7 07/03/24 15:01:10 97.7 °F (36.5 °C) 94 -- 128/82 97 95 % -- -- --    07/03/24 1341 -- -- -- -- -- -- -- -- 6 07/03/24 11:01:45 97.5 °F (36.4 °C) 98 18 130/74 93 95 % -- -- --    07/03/24 1000 -- -- -- -- -- -- -- -- No Pain    07/03/24 07:39:50 97.2 °F (36.2 °C) 88 18 122/75 91 92 % -- -- --    07/03/24 0700 -- -- -- -- -- -- None (Room air) -- No Pain    07/03/24 0612 -- -- -- -- -- -- -- -- 7 07/02/24 22:40:43 97.2 °F (36.2 °C) 96 -- 124/82 96 87 % -- -- --    07/02/24 2154 -- -- -- -- -- 92 % None (Room air) -- 8 07/02/24 2150 -- -- -- -- -- -- -- -- 8 07/02/24 19:03:51 97.5 °F (36.4 °C) 92 -- 123/75 91 90 % -- -- --    07/02/24 1517 -- -- -- -- -- -- -- -- 8 07/02/24 14:56:01 97.5 °F (36.4 °C) 98 18 159/88 112 100 % -- -- --    07/02/24 1331 -- -- -- -- -- -- None (Room air) -- No Pain    07/02/24 1316 -- -- -- -- -- -- -- -- 6    07/02/24 13:04:09 97.5 °F (36.4 °C) 68 18 135/83 100 90 % -- -- --    07/02/24 1211 -- -- -- -- -- -- -- -- 7    07/02/24 1029 98.6 °F (37 °C) 90 18 156/81 -- 98 % None (Room air) Lying 6          Weight (last 2 days)       Date/Time Weight    07/05/24 0547 123 (271.4)    07/04/24 0549 122 (270)    07/04/24 0414 123 (270.6)    07/03/24 0847 123 (271.6)     Weight: after pt ate at 07/03/24 0847    07/03/24 0600 123 (271.6)              Pertinent Labs/Diagnostic Results:   Radiology:  XR chest 1 view portable   ED Interpretation by Cheng Ivy DO (07/02 1139)   No acute findings.       Final Interpretation by Cristian Kenney MD (07/03 1034)      Slight vascular congestion.            Workstation performed: [id number]           Cardiology:  ECG 12 lead   Final Result by Keanu Mcneil MD (07/02 4658)    Normal sinus rhythm   Minimal voltage criteria for LVH, may be normal variant ( R in aVL )   Borderline ECG   When compared with ECG of 11-JUN-2024 11:51,   No significant change was found   Confirmed by Keanu Mcneil ([id number]) on 7/2/2024 2:50:59 PM           Results from last 7 days   Lab Units 07/02/24  1054   WBC Thousand/uL 3.45*   HEMOGLOBIN g/dL 11.9   HEMATOCRIT % 35.8   PLATELETS Thousands/uL 93*        Results from last 7 days   Lab Units 07/05/24 0547 07/04/24 0440 07/03/24  0505 07/02/24 1852 07/02/24  1054   SODIUM mmol/L 137 137 139 138 141   POTASSIUM mmol/L 3.2* 3.5 3.3* 3.4* 2.8*   CHLORIDE mmol/L 99 99 101 103 102   CO2 mmol/L 33* 31 32 29 32   ANION GAP mmol/L 5 7 6 6 7   BUN mg/dL 12 9 9 7 7   CREATININE mg/dL 0.85 0.84 0.89 0.87 0.78   EGFR ml/min/1.73sq m 77 79 73 75 86   CALCIUM mg/dL 8.7 8.8 8.7 8.3* 8.6   MAGNESIUM mg/dL 1.8* 1.9 2.0  --  1.5*     Results from last 7 days   Lab Units 07/04/24 0440 07/03/24  0505 07/02/24  1054   AST U/L  --   --  42*   ALT U/L  --   --  25   ALK PHOS U/L  --   --  199*   TOTAL PROTEIN g/dL  --   --  8.0   ALBUMIN g/dL  --   --  3.4*   TOTAL BILIRUBIN mg/dL  --   --  1.61*   BILIRUBIN DIRECT mg/dL  --   --  0.44*   AMMONIA umol/L 124* 78* 89*     Results from last 7 days   Lab Units 07/02/24  1215   POC GLUCOSE mg/dl 91     Results from last 7 days   Lab Units 07/05/24  0547 07/04/24 0440 07/03/24  0505 07/02/24 1852 07/02/24  1054   GLUCOSE RANDOM mg/dL 143* 126 133 134 58*        Results from last 7 days   Lab Units 07/02/24  1054   BNP pg/mL 34          Medications:   Scheduled Medications:  amitriptyline, 75 mg, Oral, HS  bumetanide, 1 mg, Intravenous, BID  enoxaparin, 40 mg, Subcutaneous, Q24H MIGUEL  folic acid, 1 mg, Oral, Daily  gabapentin, 100 mg, Oral, BID  hydroxychloroquine, 200 mg, Oral, BID With Meals  lactulose, 30 g, Oral, 4x Daily  rifaximin, 550 mg, Oral, Q12H MIGUEL  sertraline, 100 mg, Oral, Daily  spironolactone, 50 mg, Oral,  Daily      Continuous IV Infusions: None       PRN Meds:  oxyCODONE, 10 mg, Oral, Q6H PRN  (7/4 recd x3)  (7/5 recd x1 so far today)   prochlorperazine, 5 mg, Oral, Q6H PRN  (7/4 recd x2)  (7/5 recd x1 so far today)         Discharge Plan: TBD    Network Utilization Review Department  ATTENTION: Please call with any questions or concerns to [phone] and carefully listen to the prompts so that you are directed to the right person. All voicemails are confidential.   For Discharge needs, contact Care Management DC Support Team at [phone] opt. [phone]  Send all requests for admission clinical reviews, approved or denied determinations and any other requests to dedicated fax number below belonging to the [facility] where the patient is receiving treatment. List of dedicated fax numbers for the Facilities:  FACILITY NAME UR FAX NUMBER   ADMISSION DENIALS (Administrative/Medical Necessity) [phone]   DISCHARGE SUPPORT TEAM (NETWORK) [phone]   PARENT CHILD HEALTH (Maternity/NICU/Pediatrics) [phone]   [facility] [phone]   [facility] [phone]   [facility] [phone]   [facility] [phone]   [facility] [phone]   [facility] [phone]   [facility] [phone]   [facility] [phone]   [facility] [phone]   [facility] [phone]   [facility] [phone]   [facility] [phone]

## 2024-07-05 NOTE — PROGRESS NOTES
[facility]  Progress Note  Name: Bailey Olsen I  MRN: [id number]  Unit/Bed#: -01 I Date of Admission: 7/2/2024   Date of Service: 7/5/2024 I Hospital Day: 3    Assessment & Plan   * Acute on chronic diastolic (congestive) heart failure (HCC)  Assessment & Plan  Wt Readings from Last 3 Encounters:   07/05/24 123 kg (271 lb 6.4 oz)   06/18/24 117 kg (258 lb 9.6 oz)   06/02/24 119 kg (263 lb 0.1 oz)     Patient states that she was taking torsemide and Aldactone at home but despite that she was gaining weight again and got concerned her weight today is 271 pounds on her previous discharge was down to 252-258 pounds.  Hold oral torsemide placed on Lasix 60 mg IV twice daily continue Aldactone orally.  Replace potassium and magnesium and continue diuresis Lasix dose was increased to 80 mg IV twice daily yesterday however no significant diuretic response and no changes in standing weight.  Will discontinue Lasix and switch to Bumex 1 mg IV twice daily.  Continue to monitor daily standing weight and strict intake output .  -1.3 L in the last 24 hours.  2D echo done 05/2024 was normal EF.  Continue with current dose of diuretics            Hyperammonemia (HCC)  Assessment & Plan  Hyperammonemia due to liver cirrhosis, evidenced by increasing ammonia: 89->124, treated with lactulose, trending ammonia level, and neuro checks.  Currently mentation appears at baseline.  Continue with current dose of lactulose.       Pancytopenia (HCC)  Assessment & Plan  Patient has chronic pancytopenia due to liver issues.  Patient has a sepsis alert however she does not have sepsis or SIRS at this time.  No acute infection and this is all secondary to her chronic liver disease    Hypokalemia  Assessment & Plan  Replace.  Continue to monitor electrolytes closely on current diuretics.    Primary hypertension  Assessment & Plan  blood pressures currently well-controlled.cont diuresis    Other cirrhosis of liver  (HCC)  Assessment & Plan  She has known history of liver cirrhosis now presents with ammonia level of 89  She states she was taking lactulose however stopped Xifaxan at home as she was not having much response from that.  Recommended to her to continue both medications and resume both and recheck ammonia level in a.m.  Lactulose to 30 g oral increased to 4 times daily.  Has not had any bowel movements in last 24 hours.  Will give 1 additional dose of MiraLAX as well.  Ammonia level has increased however mentation remains the same.               VTE Pharmacologic Prophylaxis: VTE Score: 1 High Risk (Score >/= 5) - Pharmacological DVT Prophylaxis Ordered: enoxaparin (Lovenox). Sequential Compression Devices Ordered.    Mobility:   Basic Mobility Inpatient Raw Score: 22  JH-HLM Goal: 7: Walk 25 feet or more  JH-HLM Achieved: 7: Walk 25 feet or more  JH-HLM Goal achieved. Continue to encourage appropriate mobility.    Patient Centered Rounds: I performed bedside rounds with nursing staff today.   Discussions with Specialists or Other Care Team Provider: Discussed with nursing    Education and Discussions with Family / Patient: Updated  (sister) via phone.    Total Time Spent on Date of Encounter in care of patient: 20 mins. This time was spent on one or more of the following: performing physical exam; counseling and coordination of care; obtaining or reviewing history; documenting in the medical record; reviewing/ordering tests, medications or procedures; communicating with other healthcare professionals and discussing with patient's family/caregivers.    Current Length of Stay: 3 day(s)  Current Patient Status: Inpatient   Certification Statement: The patient will continue to require additional inpatient hospital stay due to ongoing IV diuretics  Discharge Plan: Anticipate discharge in 24-48 hrs to home.    Code Status: Level 1 - Full Code    Subjective:   Seen and examined at bedside.  Mentation remains  at baseline.  1 bowel movement reported overnight.  Denies any shortness of breath but still has significant lower extremity swelling.    Objective:     Vitals:   Temp (24hrs), Av.6 °F (36.4 °C), Min:97.5 °F (36.4 °C), Max:97.7 °F (36.5 °C)    Temp:  [97.5 °F (36.4 °C)-97.7 °F (36.5 °C)] 97.5 °F (36.4 °C)  HR:  [] 89  Resp:  [18-19] 18  BP: (121-158)/(66-94) 131/73  SpO2:  [90 %-93 %] 93 %  Body mass index is 45.16 kg/m².     Input and Output Summary (last 24 hours):     Intake/Output Summary (Last 24 hours) at 2024 1205  Last data filed at 2024 0835  Gross per 24 hour   Intake 1420 ml   Output 2275 ml   Net -855 ml       Physical Exam:   Physical Exam  Constitutional:       Appearance: She is obese. She is ill-appearing.   HENT:      Head: Normocephalic.      Nose: Nose normal.      Mouth/Throat:      Mouth: Mucous membranes are moist.   Eyes:      Pupils: Pupils are equal, round, and reactive to light.   Cardiovascular:      Rate and Rhythm: Normal rate and regular rhythm.   Pulmonary:      Effort: Pulmonary effort is normal.   Abdominal:      General: Abdomen is flat. There is distension.      Palpations: Abdomen is soft.      Tenderness: There is no abdominal tenderness.   Musculoskeletal:      Cervical back: Neck supple.      Right lower leg: Edema present.      Left lower leg: Edema present.   Skin:     General: Skin is warm.   Neurological:      General: No focal deficit present.      Mental Status: She is alert and oriented to person, place, and time. Mental status is at baseline.      Comments: No asterixis           Additional Data:     Labs:  Results from last 7 days   Lab Units 24  1054   WBC Thousand/uL 3.45*   HEMOGLOBIN g/dL 11.9   HEMATOCRIT % 35.8   PLATELETS Thousands/uL 93*     Results from last 7 days   Lab Units 24  0547 24  1852 24  1054   SODIUM mmol/L 137   < > 141   POTASSIUM mmol/L 3.2*   < > 2.8*   CHLORIDE mmol/L 99   < > 102   CO2 mmol/L 33*   <  > 32   BUN mg/dL 12   < > 7   CREATININE mg/dL 0.85   < > 0.78   ANION GAP mmol/L 5   < > 7   CALCIUM mg/dL 8.7   < > 8.6   ALBUMIN g/dL  --   --  3.4*   TOTAL BILIRUBIN mg/dL  --   --  1.61*   ALK PHOS U/L  --   --  199*   ALT U/L  --   --  25   AST U/L  --   --  42*   GLUCOSE RANDOM mg/dL 143*   < > 58*    < > = values in this interval not displayed.         Results from last 7 days   Lab Units 07/02/24  1215   POC GLUCOSE mg/dl 91               Lines/Drains:  Invasive Devices       Peripheral Intravenous Line  Duration             Peripheral IV 07/03/24 Left Antecubital 1 day    Peripheral IV 07/04/24 Dorsal (posterior);Right Hand <1 day                          Imaging: No pertinent imaging reviewed.    Recent Cultures (last 7 days):         Last 24 Hours Medication List:   Current Facility-Administered Medications   Medication Dose Route Frequency Provider Last Rate    amitriptyline  75 mg Oral HS Idalia Stuart MD      bumetanide  1 mg Intravenous BID Mar Oliva MD      enoxaparin  40 mg Subcutaneous Q24H On license of [facility] Mar Oliva MD      folic acid  1 mg Oral Daily Idalia Stuart MD      gabapentin  100 mg Oral BID Idalia Stuart MD      hydroxychloroquine  200 mg Oral BID With Meals Idalia Stuart MD      lactulose  30 g Oral 4x Daily Mar Oliva MD      oxyCODONE  10 mg Oral Q6H PRN Idalia Stuart MD      prochlorperazine  5 mg Oral Q6H PRN Idalia Stuart MD      rifaximin  550 mg Oral Q12H On license of [facility] Idalia Stuart MD      sertraline  100 mg Oral Daily Idalia Stuart MD      spironolactone  50 mg Oral Daily Idalia Stuart MD          Today, Patient Was Seen By: Mar Oliva MD    **Please Note: This note may have been constructed using a voice recognition system.**

## 2024-07-05 NOTE — ASSESSMENT & PLAN NOTE
Hyperammonemia due to liver cirrhosis, evidenced by increasing ammonia: 89->124, treated with lactulose, trending ammonia level, and neuro checks.  Currently mentation appears at baseline.  Continue with current dose of lactulose.

## 2024-07-05 NOTE — PLAN OF CARE
Problem: PAIN - ADULT  Goal: Verbalizes/displays adequate comfort level or baseline comfort level  Description: Interventions:  - Encourage patient to monitor pain and request assistance  - Assess pain using appropriate pain scale  - Administer analgesics based on type and severity of pain and evaluate response  - Implement non-pharmacological measures as appropriate and evaluate response  - Consider cultural and social influences on pain and pain management  - Notify physician/advanced practitioner if interventions unsuccessful or patient reports new pain  Outcome: Progressing     Problem: INFECTION - ADULT  Goal: Absence or prevention of progression during hospitalization  Description: INTERVENTIONS:  - Assess and monitor for signs and symptoms of infection  - Monitor lab/diagnostic results  - Monitor all insertion sites, i.e. indwelling lines, tubes, and drains  - Monitor endotracheal if appropriate and nasal secretions for changes in amount and color  - Bennett appropriate cooling/warming therapies per order  - Administer medications as ordered  - Instruct and encourage patient and family to use good hand hygiene technique  - Identify and instruct in appropriate isolation precautions for identified infection/condition  Outcome: Progressing     Problem: SAFETY ADULT  Goal: Patient will remain free of falls  Description: INTERVENTIONS:  - Educate patient/family on patient safety including physical limitations  - Instruct patient to call for assistance with activity   - Consult OT/PT to assist with strengthening/mobility   - Keep Call bell within reach  - Keep bed low and locked with side rails adjusted as appropriate  - Keep care items and personal belongings within reach  - Initiate and maintain comfort rounds  Outcome: Progressing  Goal: Maintain or return to baseline ADL function  Description: INTERVENTIONS:  -  Assess patient's ability to carry out ADLs; assess patient's baseline for ADL function and  identify physical deficits which impact ability to perform ADLs (bathing, care of mouth/teeth, toileting, grooming, dressing, etc.)  - Assess/evaluate cause of self-care deficits   - Assess range of motion  - Assess patient's mobility; develop plan if impaired  - Assess patient's need for assistive devices and provide as appropriate  - Encourage maximum independence but intervene and supervise when necessary  - Involve family in performance of ADLs  - Assess for home care needs following discharge   - Consider OT consult to assist with ADL evaluation and planning for discharge  - Provide patient education as appropriate  Outcome: Progressing  Goal: Maintains/Returns to pre admission functional level  Description: INTERVENTIONS:  - Perform AM-PAC 6 Click Basic Mobility/ Daily Activity assessment daily.  - Set and communicate daily mobility goal to care team and patient/family/caregiver.   - Collaborate with rehabilitation services on mobility goals if consulted  - Perform Range of Motion 3 times a day.  - Reposition patient every 2 hours.  - Dangle patient 2 times a day  - Stand patient 2 times a day  - Ambulate patient 2 times a day  - Out of bed to chair 2 times a day   - Out of bed for meals 2 times a day  - Out of bed for toileting  - Record patient progress and toleration of activity level   Outcome: Progressing     Problem: DISCHARGE PLANNING  Goal: Discharge to home or other facility with appropriate resources  Description: INTERVENTIONS:  - Identify barriers to discharge w/patient and caregiver  - Arrange for needed discharge resources and transportation as appropriate  - Identify discharge learning needs (meds, wound care, etc.)  - Arrange for interpretive services to assist at discharge as needed  - Refer to Case Management Department for coordinating discharge planning if the patient needs post-hospital services based on physician/advanced practitioner order or complex needs related to functional status,  cognitive ability, or social support system  Outcome: Progressing     Problem: Knowledge Deficit  Goal: Patient/family/caregiver demonstrates understanding of disease process, treatment plan, medications, and discharge instructions  Description: Complete learning assessment and assess knowledge base.  Interventions:  - Provide teaching at level of understanding  - Provide teaching via preferred learning methods  Outcome: Progressing     Problem: CARDIOVASCULAR - ADULT  Goal: Maintains optimal cardiac output and hemodynamic stability  Description: INTERVENTIONS:  - Monitor I/O, vital signs and rhythm  - Monitor for S/S and trends of decreased cardiac output monitor intake on fluid restriction  - Administer and titrate ordered vasoactive medications to optimize hemodynamic stability  - Assess quality of pulses, skin color and temperature  - Assess for signs of decreased coronary artery perfusion  - Instruct patient to report change in severity of symptoms  Outcome: Progressing  Goal: Absence of cardiac dysrhythmias or at baseline rhythm  Description: INTERVENTIONS:  - Continuous cardiac monitoring, vital signs, obtain 12 lead EKG if ordered  - Administer antiarrhythmic and heart rate control medications as ordered  - Monitor electrolytes and administer replacement therapy as ordered  Outcome: Progressing     Problem: MOBILITY - ADULT  Goal: Maintain or return to baseline ADL function  Description: INTERVENTIONS:  -  Assess patient's ability to carry out ADLs; assess patient's baseline for ADL function and identify physical deficits which impact ability to perform ADLs (bathing, care of mouth/teeth, toileting, grooming, dressing, etc.)  - Assess/evaluate cause of self-care deficits   - Assess range of motion  - Assess patient's mobility; develop plan if impaired  - Assess patient's need for assistive devices and provide as appropriate  - Encourage maximum independence but intervene and supervise when necessary  -  Involve family in performance of ADLs  - Assess for home care needs following discharge   - Consider OT consult to assist with ADL evaluation and planning for discharge  - Provide patient education as appropriate  Outcome: Progressing  Goal: Maintains/Returns to pre admission functional level  Description: INTERVENTIONS:  - Perform AM-PAC 6 Click Basic Mobility/ Daily Activity assessment daily.  - Set and communicate daily mobility goal to care team and patient/family/caregiver.   - Collaborate with rehabilitation services on mobility goals if consulted  - Perform Range of Motion 3 times a day.  - Reposition patient every 2 hours.  - Dangle patient 2 times a day  - Stand patient 2 times a day  - Ambulate patient 2 times a day  - Out of bed to chair 2 times a day   - Out of bed for meals 2 times a day  - Out of bed for toileting  - Record patient progress and toleration of activity level   Outcome: Progressing

## 2024-07-05 NOTE — CASE MANAGEMENT
Case Management Discharge Planning Note    Patient name Bailey Olsen  Location /-01 MRN [id number]  : 1970 Date 2024       Current Admission Date: 2024  Current Admission Diagnosis:Acute on chronic diastolic (congestive) heart failure (HCC)   Patient Active Problem List    Diagnosis Date Noted Date Diagnosed    Hyperammonemia (HCC) 2024     Hepatic encephalopathy (HCC) 2024     Fall 2024     Other cirrhosis of liver (HCC) 2024     History of malignant carcinoid tumor of small intestine 2024     Class 3 severe obesity due to excess calories in adult (HCC) 2024     Primary hypertension 2024     Anxiety 2024     Hypokalemia 2024     Fibromyalgia 2024     Iron deficiency anemia 2024     Pancytopenia (HCC) 2024     Sarcoidosis 2024     Acute on chronic diastolic (congestive) heart failure (HCC) 2024     Bilateral pulmonary infiltrates 2024       LOS (days): 3  Geometric Mean LOS (GMLOS) (days): 3  Days to GMLOS:-0.2     OBJECTIVE:  Risk of Unplanned Readmission Score: 21.74         Current admission status: Inpatient   Preferred Pharmacy:   [facility] [address] [place] [address] [place] - [address] [facility]  [address] [facility]   Phone: [phone] Fax: [phone]    Primary Care Provider: Nayely Brown DO    Primary Insurance: [facility]  Secondary Insurance:     DISCHARGE DETAILS:     CM riley Perkins on discharge timeframes.     CM to follow patient's care and discharge needs.

## 2024-07-05 NOTE — PLAN OF CARE
Problem: CARDIOVASCULAR - ADULT  Goal: Maintains optimal cardiac output and hemodynamic stability  Description: INTERVENTIONS:  - Monitor I/O, vital signs and rhythm  - Monitor for S/S and trends of decreased cardiac output monitor intake on fluid restriction  - Administer and titrate ordered vasoactive medications to optimize hemodynamic stability  - Assess quality of pulses, skin color and temperature  - Assess for signs of decreased coronary artery perfusion  - Instruct patient to report change in severity of symptoms  Outcome: Progressing

## 2024-07-05 NOTE — ASSESSMENT & PLAN NOTE
Wt Readings from Last 3 Encounters:   07/05/24 123 kg (271 lb 6.4 oz)   06/18/24 117 kg (258 lb 9.6 oz)   06/02/24 119 kg (263 lb 0.1 oz)     Patient states that she was taking torsemide and Aldactone at home but despite that she was gaining weight again and got concerned her weight today is 271 pounds on her previous discharge was down to 252-258 pounds.  Hold oral torsemide placed on Lasix 60 mg IV twice daily continue Aldactone orally.  Replace potassium and magnesium and continue diuresis Lasix dose was increased to 80 mg IV twice daily yesterday however no significant diuretic response and no changes in standing weight.  Will discontinue Lasix and switch to Bumex 1 mg IV twice daily.  Continue to monitor daily standing weight and strict intake output .  -1.3 L in the last 24 hours.  2D echo done 05/2024 was normal EF.  Continue with current dose of diuretics

## 2024-07-06 LAB
ALBUMIN SERPL BCG-MCNC: 3.3 G/DL (ref 3.5–5)
ALP SERPL-CCNC: 196 U/L (ref 34–104)
ALT SERPL W P-5'-P-CCNC: 18 U/L (ref 7–52)
ANION GAP SERPL CALCULATED.3IONS-SCNC: 9 MMOL/L (ref 4–13)
AST SERPL W P-5'-P-CCNC: 51 U/L (ref 13–39)
BILIRUB SERPL-MCNC: 1.47 MG/DL (ref 0.2–1)
BUN SERPL-MCNC: 12 MG/DL (ref 5–25)
CALCIUM ALBUM COR SERPL-MCNC: 9.5 MG/DL (ref 8.3–10.1)
CALCIUM SERPL-MCNC: 8.9 MG/DL (ref 8.4–10.2)
CHLORIDE SERPL-SCNC: 99 MMOL/L (ref 96–108)
CO2 SERPL-SCNC: 27 MMOL/L (ref 21–32)
CREAT SERPL-MCNC: 0.86 MG/DL (ref 0.6–1.3)
GFR SERPL CREATININE-BSD FRML MDRD: 76 ML/MIN/1.73SQ M
GLUCOSE SERPL-MCNC: 106 MG/DL (ref 65–140)
INR PPP: 1.26 (ref 0.84–1.19)
POTASSIUM SERPL-SCNC: 3.7 MMOL/L (ref 3.5–5.3)
PROT SERPL-MCNC: 8.3 G/DL (ref 6.4–8.4)
PROTHROMBIN TIME: 16.1 SECONDS (ref 11.6–14.5)
SODIUM SERPL-SCNC: 135 MMOL/L (ref 135–147)

## 2024-07-06 PROCEDURE — 80053 COMPREHEN METABOLIC PANEL: CPT | Performed by: INTERNAL MEDICINE

## 2024-07-06 PROCEDURE — 85610 PROTHROMBIN TIME: CPT | Performed by: INTERNAL MEDICINE

## 2024-07-06 PROCEDURE — 99232 SBSQ HOSP IP/OBS MODERATE 35: CPT | Performed by: INTERNAL MEDICINE

## 2024-07-06 RX ORDER — METOLAZONE 5 MG/1
2.5 TABLET ORAL ONCE
Status: COMPLETED | OUTPATIENT
Start: 2024-07-06 | End: 2024-07-06

## 2024-07-06 RX ORDER — ACETAMINOPHEN 325 MG/1
650 TABLET ORAL EVERY 6 HOURS PRN
Status: DISCONTINUED | OUTPATIENT
Start: 2024-07-06 | End: 2024-07-08 | Stop reason: HOSPADM

## 2024-07-06 RX ADMIN — BUMETANIDE 1 MG: 0.25 INJECTION INTRAMUSCULAR; INTRAVENOUS at 17:31

## 2024-07-06 RX ADMIN — SPIRONOLACTONE 50 MG: 25 TABLET ORAL at 10:26

## 2024-07-06 RX ADMIN — LACTULOSE 30 G: 20 SOLUTION ORAL at 11:49

## 2024-07-06 RX ADMIN — AMITRIPTYLINE HYDROCHLORIDE 75 MG: 25 TABLET, FILM COATED ORAL at 21:27

## 2024-07-06 RX ADMIN — ACETAMINOPHEN 325MG 650 MG: 325 TABLET ORAL at 20:44

## 2024-07-06 RX ADMIN — PROCHLORPERAZINE MALEATE 5 MG: 10 TABLET ORAL at 05:38

## 2024-07-06 RX ADMIN — SERTRALINE HYDROCHLORIDE 100 MG: 100 TABLET ORAL at 10:26

## 2024-07-06 RX ADMIN — RIFAXIMIN 550 MG: 550 TABLET ORAL at 20:44

## 2024-07-06 RX ADMIN — LACTULOSE 30 G: 20 SOLUTION ORAL at 10:23

## 2024-07-06 RX ADMIN — OXYCODONE HYDROCHLORIDE 10 MG: 10 TABLET ORAL at 11:48

## 2024-07-06 RX ADMIN — HYDROXYCHLOROQUINE SULFATE 200 MG: 200 TABLET, FILM COATED ORAL at 17:31

## 2024-07-06 RX ADMIN — GABAPENTIN 100 MG: 100 CAPSULE ORAL at 10:26

## 2024-07-06 RX ADMIN — LACTULOSE 30 G: 20 SOLUTION ORAL at 17:31

## 2024-07-06 RX ADMIN — OXYCODONE HYDROCHLORIDE 10 MG: 10 TABLET ORAL at 18:25

## 2024-07-06 RX ADMIN — PROCHLORPERAZINE MALEATE 5 MG: 10 TABLET ORAL at 18:25

## 2024-07-06 RX ADMIN — FOLIC ACID 1 MG: 1 TABLET ORAL at 10:26

## 2024-07-06 RX ADMIN — BUMETANIDE 1 MG: 0.25 INJECTION INTRAMUSCULAR; INTRAVENOUS at 10:26

## 2024-07-06 RX ADMIN — OXYCODONE HYDROCHLORIDE 10 MG: 10 TABLET ORAL at 05:38

## 2024-07-06 RX ADMIN — RIFAXIMIN 550 MG: 550 TABLET ORAL at 10:26

## 2024-07-06 RX ADMIN — PROCHLORPERAZINE MALEATE 5 MG: 10 TABLET ORAL at 11:48

## 2024-07-06 RX ADMIN — HYDROXYCHLOROQUINE SULFATE 200 MG: 200 TABLET, FILM COATED ORAL at 10:26

## 2024-07-06 RX ADMIN — ENOXAPARIN SODIUM 40 MG: 40 INJECTION SUBCUTANEOUS at 10:26

## 2024-07-06 RX ADMIN — METOLAZONE 2.5 MG: 5 TABLET ORAL at 13:15

## 2024-07-06 RX ADMIN — LACTULOSE 30 G: 20 SOLUTION ORAL at 21:27

## 2024-07-06 RX ADMIN — GABAPENTIN 100 MG: 100 CAPSULE ORAL at 17:31

## 2024-07-06 NOTE — ASSESSMENT & PLAN NOTE
Wt Readings from Last 3 Encounters:   07/06/24 124 kg (273 lb 12.8 oz)   06/18/24 117 kg (258 lb 9.6 oz)   06/02/24 119 kg (263 lb 0.1 oz)     Patient states that she was taking torsemide and Aldactone at home but despite that she was gaining weight again and got concerned her weight today is 271 pounds on her previous discharge was down to 252-258 pounds.  Hold oral torsemide placed on Lasix 60 mg IV twice daily continue Aldactone orally.  Replace potassium and magnesium and continue diuresis Lasix dose was increased to 80 mg IV twice daily yesterday however no significant diuretic response and no changes in standing weight.  Will discontinue Lasix and switch to Bumex 1 mg IV twice daily.  Had negative I's and O's in the last 24 hours however no improvement in weights.  When asked 1 dose of Zaroxolyn to aid diuretic response.  Continue to monitor daily standing weight and strict intake output . 2D echo done 05/2024 was normal EF.  Continue with current dose of diuretics

## 2024-07-06 NOTE — PROGRESS NOTES
[facility]  Progress Note  Name: Bailey Olsen I  MRN: [id number]  Unit/Bed#: -01 I Date of Admission: 7/2/2024   Date of Service: 7/6/2024 I Hospital Day: 4    Assessment & Plan   * Acute on chronic diastolic (congestive) heart failure (HCC)  Assessment & Plan  Wt Readings from Last 3 Encounters:   07/06/24 124 kg (273 lb 12.8 oz)   06/18/24 117 kg (258 lb 9.6 oz)   06/02/24 119 kg (263 lb 0.1 oz)     Patient states that she was taking torsemide and Aldactone at home but despite that she was gaining weight again and got concerned her weight today is 271 pounds on her previous discharge was down to 252-258 pounds.  Hold oral torsemide placed on Lasix 60 mg IV twice daily continue Aldactone orally.  Replace potassium and magnesium and continue diuresis Lasix dose was increased to 80 mg IV twice daily yesterday however no significant diuretic response and no changes in standing weight.  Will discontinue Lasix and switch to Bumex 1 mg IV twice daily.  Had negative I's and O's in the last 24 hours however no improvement in weights.  When asked 1 dose of Zaroxolyn to aid diuretic response.  Continue to monitor daily standing weight and strict intake output . 2D echo done 05/2024 was normal EF.  Continue with current dose of diuretics            Hyperammonemia (HCC)  Assessment & Plan  Hyperammonemia due to liver cirrhosis, evidenced by increasing ammonia: 89->124, treated with lactulose, trending ammonia level, and neuro checks.  Currently mentation appears at baseline.  Continue with current dose of lactulose.       Pancytopenia (HCC)  Assessment & Plan  Patient has chronic pancytopenia due to liver issues.  Patient has a sepsis alert however she does not have sepsis or SIRS at this time.  No acute infection and this is all secondary to her chronic liver disease    Hypokalemia  Assessment & Plan  Replace.  Continue to monitor electrolytes closely on current diuretics.  Potassium has  normalized.    Primary hypertension  Assessment & Plan  blood pressures currently well-controlled.cont diuresis    Other cirrhosis of liver (HCC)  Assessment & Plan  She has known history of liver cirrhosis now presents with ammonia level of 89  She states she was taking lactulose however stopped Xifaxan at home as she was not having much response from that.  Recommended to her to continue both medications and resume both and recheck ammonia level in a.m.  Lactulose to 30 g oral increased to 4 times daily.  Has not had any bowel movements in last 24 hours.  Will give 1 additional dose of MiraLAX as well.  Ammonia level has increased however mentation remains the same.  MELD score is 10.               VTE Pharmacologic Prophylaxis: VTE Score: 1 High Risk (Score >/= 5) - Pharmacological DVT Prophylaxis Ordered: enoxaparin (Lovenox). Sequential Compression Devices Ordered.    Mobility:   Basic Mobility Inpatient Raw Score: 22  JH-HLM Goal: 7: Walk 25 feet or more  JH-HLM Achieved: 7: Walk 25 feet or more  JH-HLM Goal achieved. Continue to encourage appropriate mobility.    Patient Centered Rounds: I performed bedside rounds with nursing staff today.   Discussions with Specialists or Other Care Team Provider: jean nursing     Education and Discussions with Family / Patient:     Total Time Spent on Date of Encounter in care of patient: 20 mins. This time was spent on one or more of the following: performing physical exam; counseling and coordination of care; obtaining or reviewing history; documenting in the medical record; reviewing/ordering tests, medications or procedures; communicating with other healthcare professionals and discussing with patient's family/caregivers.    Current Length of Stay: 4 day(s)  Current Patient Status: Inpatient   Certification Statement: The patient will continue to require additional inpatient hospital stay due to ongoing IV diuresis  Discharge Plan: Anticipate discharge in 24-48 hrs to  home.    Code Status: Level 1 - Full Code    Subjective:   Patient seen and examined at bedside.  No improvement in weight despite diuresis in the last 24 hours.  Denies any worsening shortness of breath abdominal pain    Objective:     Vitals:   Temp (24hrs), Av.3 °F (36.3 °C), Min:97.3 °F (36.3 °C), Max:97.3 °F (36.3 °C)    Temp:  [97.3 °F (36.3 °C)] 97.3 °F (36.3 °C)  HR:  [88-97] 88  Resp:  [18-19] 18  BP: (129-147)/(75-91) 130/82  SpO2:  [91 %-94 %] 94 %  Body mass index is 45.56 kg/m².     Input and Output Summary (last 24 hours):     Intake/Output Summary (Last 24 hours) at 2024 1244  Last data filed at 2024 1143  Gross per 24 hour   Intake 1420 ml   Output 1500 ml   Net -80 ml       Physical Exam:   Physical Exam  Constitutional:       General: She is not in acute distress.     Appearance: She is obese.   HENT:      Head: Normocephalic.      Nose: Nose normal.      Mouth/Throat:      Mouth: Mucous membranes are moist.   Eyes:      Extraocular Movements: Extraocular movements intact.      Pupils: Pupils are equal, round, and reactive to light.   Cardiovascular:      Rate and Rhythm: Normal rate and regular rhythm.   Pulmonary:      Effort: Pulmonary effort is normal.   Abdominal:      General: Abdomen is flat. Bowel sounds are normal.      Palpations: Abdomen is soft.   Musculoskeletal:      Cervical back: Neck supple.      Right lower leg: Edema present.      Left lower leg: Edema present.   Neurological:      General: No focal deficit present.      Mental Status: She is alert and oriented to person, place, and time. Mental status is at baseline.   Psychiatric:         Mood and Affect: Mood normal.          Additional Data:     Labs:  Results from last 7 days   Lab Units 24  1054   WBC Thousand/uL 3.45*   HEMOGLOBIN g/dL 11.9   HEMATOCRIT % 35.8   PLATELETS Thousands/uL 93*     Results from last 7 days   Lab Units 24  0514   SODIUM mmol/L 135   POTASSIUM mmol/L 3.7   CHLORIDE mmol/L  99   CO2 mmol/L 27   BUN mg/dL 12   CREATININE mg/dL 0.86   ANION GAP mmol/L 9   CALCIUM mg/dL 8.9   ALBUMIN g/dL 3.3*   TOTAL BILIRUBIN mg/dL 1.47*   ALK PHOS U/L 196*   ALT U/L 18   AST U/L 51*   GLUCOSE RANDOM mg/dL 106     Results from last 7 days   Lab Units 07/06/24  0547   INR  1.26*     Results from last 7 days   Lab Units 07/02/24  1215   POC GLUCOSE mg/dl 91               Lines/Drains:  Invasive Devices       Peripheral Intravenous Line  Duration             Peripheral IV 07/03/24 Left Antecubital 2 days    Peripheral IV 07/04/24 Dorsal (posterior);Right Hand 1 day                          Imaging: No pertinent imaging reviewed.    Recent Cultures (last 7 days):         Last 24 Hours Medication List:   Current Facility-Administered Medications   Medication Dose Route Frequency Provider Last Rate    amitriptyline  75 mg Oral HS Idalia Stuart MD      bumetanide  1 mg Intravenous BID Mar Oliva MD      enoxaparin  40 mg Subcutaneous Q24H [facility] Mar Oliva MD      folic acid  1 mg Oral Daily Idalia Stuart MD      gabapentin  100 mg Oral BID Idalia Stuart MD      hydroxychloroquine  200 mg Oral BID With Meals Idalia Stuart MD      lactulose  30 g Oral 4x Daily Mar Oliva MD      metolazone  2.5 mg Oral Once Mar Oliva MD      oxyCODONE  10 mg Oral Q6H PRN Idalia Stuart MD      prochlorperazine  5 mg Oral Q6H PRN Idalia Stuart MD      rifaximin  550 mg Oral Q12H [facility] Idalia Stuart MD      sertraline  100 mg Oral Daily Idalia Stuart MD      spironolactone  50 mg Oral Daily Idalia Stuart MD          Today, Patient Was Seen By: Mar Oliva MD    **Please Note: This note may have been constructed using a voice recognition system.**

## 2024-07-06 NOTE — PLAN OF CARE
Problem: PAIN - ADULT  Goal: Verbalizes/displays adequate comfort level or baseline comfort level  Description: Interventions:  - Encourage patient to monitor pain and request assistance  - Assess pain using appropriate pain scale  - Administer analgesics based on type and severity of pain and evaluate response  - Implement non-pharmacological measures as appropriate and evaluate response  - Consider cultural and social influences on pain and pain management  - Notify physician/advanced practitioner if interventions unsuccessful or patient reports new pain  Outcome: Progressing     Problem: CARDIOVASCULAR - ADULT  Goal: Maintains optimal cardiac output and hemodynamic stability  Description: INTERVENTIONS:  - Monitor I/O, vital signs and rhythm  - Monitor for S/S and trends of decreased cardiac output monitor intake on fluid restriction  - Administer and titrate ordered vasoactive medications to optimize hemodynamic stability  - Assess quality of pulses, skin color and temperature  - Assess for signs of decreased coronary artery perfusion  - Instruct patient to report change in severity of symptoms  Outcome: Progressing

## 2024-07-06 NOTE — PLAN OF CARE
Problem: PAIN - ADULT  Goal: Verbalizes/displays adequate comfort level or baseline comfort level  Description: Interventions:  - Encourage patient to monitor pain and request assistance  - Assess pain using appropriate pain scale  - Administer analgesics based on type and severity of pain and evaluate response  - Implement non-pharmacological measures as appropriate and evaluate response  - Consider cultural and social influences on pain and pain management  - Notify physician/advanced practitioner if interventions unsuccessful or patient reports new pain  Outcome: Progressing     Problem: INFECTION - ADULT  Goal: Absence or prevention of progression during hospitalization  Description: INTERVENTIONS:  - Assess and monitor for signs and symptoms of infection  - Monitor lab/diagnostic results  - Monitor all insertion sites, i.e. indwelling lines, tubes, and drains  - Monitor endotracheal if appropriate and nasal secretions for changes in amount and color  - [place] appropriate cooling/warming therapies per order  - Administer medications as ordered  - Instruct and encourage patient and family to use good hand hygiene technique  - Identify and instruct in appropriate isolation precautions for identified infection/condition  Outcome: Progressing     Problem: SAFETY ADULT  Goal: Patient will remain free of falls  Description: INTERVENTIONS:  - Educate patient/family on patient safety including physical limitations  - Instruct patient to call for assistance with activity   - Consult OT/PT to assist with strengthening/mobility   - Keep Call bell within reach  - Keep bed low and locked with side rails adjusted as appropriate  - Keep care items and personal belongings within reach  - Initiate and maintain comfort rounds  - Make Fall Risk Sign visible to staff  - Offer Toileting every 2 Hours, in advance of need  - Initiate/Maintain 2alarm  - Obtain necessary fall risk management equipment: 2  - Apply yellow socks and  bracelet for high fall risk patients  - Consider moving patient to room near nurses station  Outcome: Progressing  Goal: Maintain or return to baseline ADL function  Description: INTERVENTIONS:  -  Assess patient's ability to carry out ADLs; assess patient's baseline for ADL function and identify physical deficits which impact ability to perform ADLs (bathing, care of mouth/teeth, toileting, grooming, dressing, etc.)  - Assess/evaluate cause of self-care deficits   - Assess range of motion  - Assess patient's mobility; develop plan if impaired  - Assess patient's need for assistive devices and provide as appropriate  - Encourage maximum independence but intervene and supervise when necessary  - Involve family in performance of ADLs  - Assess for home care needs following discharge   - Consider OT consult to assist with ADL evaluation and planning for discharge  - Provide patient education as appropriate  Outcome: Progressing  Goal: Maintains/Returns to pre admission functional level  Description: INTERVENTIONS:  - Perform AM-PAC 6 Click Basic Mobility/ Daily Activity assessment daily.  - Set and communicate daily mobility goal to care team and patient/family/caregiver.   - Collaborate with rehabilitation services on mobility goals if consulted  - Perform Range of Motion 2 times a day.  - Reposition patient every 2 hours.  - Dangle patient 2 times a day  - Stand patient 2 times a day  - Ambulate patient 2 times a day  - Out of bed to chair 2 times a day   - Out of bed for meals 2 times a day  - Out of bed for toileting  - Record patient progress and toleration of activity level   Outcome: Progressing     Problem: DISCHARGE PLANNING  Goal: Discharge to home or other facility with appropriate resources  Description: INTERVENTIONS:  - Identify barriers to discharge w/patient and caregiver  - Arrange for needed discharge resources and transportation as appropriate  - Identify discharge learning needs (meds, wound care,  etc.)  - Arrange for interpretive services to assist at discharge as needed  - Refer to Case Management Department for coordinating discharge planning if the patient needs post-hospital services based on physician/advanced practitioner order or complex needs related to functional status, cognitive ability, or social support system  Outcome: Progressing     Problem: Knowledge Deficit  Goal: Patient/family/caregiver demonstrates understanding of disease process, treatment plan, medications, and discharge instructions  Description: Complete learning assessment and assess knowledge base.  Interventions:  - Provide teaching at level of understanding  - Provide teaching via preferred learning methods  Outcome: Progressing     Problem: CARDIOVASCULAR - ADULT  Goal: Maintains optimal cardiac output and hemodynamic stability  Description: INTERVENTIONS:  - Monitor I/O, vital signs and rhythm  - Monitor for S/S and trends of decreased cardiac output monitor intake on fluid restriction  - Administer and titrate ordered vasoactive medications to optimize hemodynamic stability  - Assess quality of pulses, skin color and temperature  - Assess for signs of decreased coronary artery perfusion  - Instruct patient to report change in severity of symptoms  Outcome: Progressing  Goal: Absence of cardiac dysrhythmias or at baseline rhythm  Description: INTERVENTIONS:  - Continuous cardiac monitoring, vital signs, obtain 12 lead EKG if ordered  - Administer antiarrhythmic and heart rate control medications as ordered  - Monitor electrolytes and administer replacement therapy as ordered  Outcome: Progressing     Problem: MOBILITY - ADULT  Goal: Maintain or return to baseline ADL function  Description: INTERVENTIONS:  -  Assess patient's ability to carry out ADLs; assess patient's baseline for ADL function and identify physical deficits which impact ability to perform ADLs (bathing, care of mouth/teeth, toileting, grooming, dressing,  etc.)  - Assess/evaluate cause of self-care deficits   - Assess range of motion  - Assess patient's mobility; develop plan if impaired  - Assess patient's need for assistive devices and provide as appropriate  - Encourage maximum independence but intervene and supervise when necessary  - Involve family in performance of ADLs  - Assess for home care needs following discharge   - Consider OT consult to assist with ADL evaluation and planning for discharge  - Provide patient education as appropriate  Outcome: Progressing  Goal: Maintains/Returns to pre admission functional level  Description: INTERVENTIONS:  - Perform AM-PAC 6 Click Basic Mobility/ Daily Activity assessment daily.  - Set and communicate daily mobility goal to care team and patient/family/caregiver.   - Collaborate with rehabilitation services on mobility goals if consulted  - Perform Range of Motion 2 times a day.  - Reposition patient every 2 hours.  - Dangle patient 2 times a day  - Stand patient 2 times a day  - Ambulate patient 2 times a day  - Out of bed to chair 2 times a day   - Out of bed for meals 2 times a day  - Out of bed for toileting  - Record patient progress and toleration of activity level   Outcome: Progressing

## 2024-07-06 NOTE — ASSESSMENT & PLAN NOTE
She has known history of liver cirrhosis now presents with ammonia level of 89  She states she was taking lactulose however stopped Xifaxan at home as she was not having much response from that.  Recommended to her to continue both medications and resume both and recheck ammonia level in a.m.  Lactulose to 30 g oral increased to 4 times daily.  Has not had any bowel movements in last 24 hours.  Will give 1 additional dose of MiraLAX as well.  Ammonia level has increased however mentation remains the same.  MELD score is 10.

## 2024-07-07 PROCEDURE — 99232 SBSQ HOSP IP/OBS MODERATE 35: CPT | Performed by: INTERNAL MEDICINE

## 2024-07-07 RX ORDER — LACTULOSE 10 G/15ML
30 SOLUTION ORAL 3 TIMES DAILY
Status: DISCONTINUED | OUTPATIENT
Start: 2024-07-07 | End: 2024-07-08 | Stop reason: HOSPADM

## 2024-07-07 RX ADMIN — FOLIC ACID 1 MG: 1 TABLET ORAL at 08:48

## 2024-07-07 RX ADMIN — BUMETANIDE 1 MG: 0.25 INJECTION INTRAMUSCULAR; INTRAVENOUS at 17:24

## 2024-07-07 RX ADMIN — HYDROXYCHLOROQUINE SULFATE 200 MG: 200 TABLET, FILM COATED ORAL at 17:24

## 2024-07-07 RX ADMIN — OXYCODONE HYDROCHLORIDE 10 MG: 10 TABLET ORAL at 11:50

## 2024-07-07 RX ADMIN — SPIRONOLACTONE 50 MG: 25 TABLET ORAL at 08:48

## 2024-07-07 RX ADMIN — GABAPENTIN 100 MG: 100 CAPSULE ORAL at 17:24

## 2024-07-07 RX ADMIN — PROCHLORPERAZINE MALEATE 5 MG: 10 TABLET ORAL at 11:50

## 2024-07-07 RX ADMIN — SERTRALINE HYDROCHLORIDE 100 MG: 100 TABLET ORAL at 08:48

## 2024-07-07 RX ADMIN — HYDROXYCHLOROQUINE SULFATE 200 MG: 200 TABLET, FILM COATED ORAL at 08:30

## 2024-07-07 RX ADMIN — LACTULOSE 30 G: 20 SOLUTION ORAL at 17:24

## 2024-07-07 RX ADMIN — PROCHLORPERAZINE MALEATE 5 MG: 10 TABLET ORAL at 04:07

## 2024-07-07 RX ADMIN — LACTULOSE 30 G: 20 SOLUTION ORAL at 11:50

## 2024-07-07 RX ADMIN — ENOXAPARIN SODIUM 40 MG: 40 INJECTION SUBCUTANEOUS at 11:51

## 2024-07-07 RX ADMIN — BUMETANIDE 1 MG: 0.25 INJECTION INTRAMUSCULAR; INTRAVENOUS at 08:48

## 2024-07-07 RX ADMIN — GABAPENTIN 100 MG: 100 CAPSULE ORAL at 08:48

## 2024-07-07 RX ADMIN — PROCHLORPERAZINE MALEATE 5 MG: 10 TABLET ORAL at 19:48

## 2024-07-07 RX ADMIN — AMITRIPTYLINE HYDROCHLORIDE 75 MG: 25 TABLET, FILM COATED ORAL at 21:21

## 2024-07-07 RX ADMIN — RIFAXIMIN 550 MG: 550 TABLET ORAL at 08:48

## 2024-07-07 RX ADMIN — RIFAXIMIN 550 MG: 550 TABLET ORAL at 21:21

## 2024-07-07 RX ADMIN — LACTULOSE 30 G: 20 SOLUTION ORAL at 08:48

## 2024-07-07 RX ADMIN — OXYCODONE HYDROCHLORIDE 10 MG: 10 TABLET ORAL at 04:07

## 2024-07-07 RX ADMIN — OXYCODONE HYDROCHLORIDE 10 MG: 10 TABLET ORAL at 19:49

## 2024-07-07 NOTE — ASSESSMENT & PLAN NOTE
Hyperammonemia due to liver cirrhosis, evidenced by increasing ammonia: 89->124, treated with lactulose, trending ammonia level, and neuro checks.  Currently mentation appears at baseline.  Continue with lactulose.  Dose decreased diuretic secondary to multiple bowel movements in the last 24 hours..

## 2024-07-07 NOTE — ASSESSMENT & PLAN NOTE
Wt Readings from Last 3 Encounters:   07/07/24 124 kg (273 lb 9.6 oz)   06/18/24 117 kg (258 lb 9.6 oz)   06/02/24 119 kg (263 lb 0.1 oz)     Patient states that she was taking torsemide and Aldactone at home but despite that she was gaining weight again and got concerned her weight today is 271 pounds on her previous discharge was down to 252-258 pounds.  Hold oral torsemide placed on Lasix 60 mg IV twice daily continue Aldactone orally.  Replace potassium and magnesium and continue diuresis Lasix dose was increased to 80 mg IV twice daily yesterday however no significant diuretic response and no changes in standing weight.  Will discontinue Lasix and switch to Bumex 1 mg IV twice daily.  Had negative I's and O's in the last 24 hours however no improvement in weights.    Continue to monitor daily standing weight and strict intake output . 2D echo done 05/2024 was normal EF.  Continue with current dose of diuretics.  Has been diuresing well and has been negative consistently in the last 48 hours however no decline in weight.  Remains hemodynamically stable with improved breathing.  Likely can transition to oral diuretics in the next 24 hours.

## 2024-07-07 NOTE — PROGRESS NOTES
[facility]  Progress Note  Name: Bailey Olsen I  MRN: [id number]  Unit/Bed#: -01 I Date of Admission: 7/2/2024   Date of Service: 7/7/2024 I Hospital Day: 5    Assessment & Plan   * Acute on chronic diastolic (congestive) heart failure (HCC)  Assessment & Plan  Wt Readings from Last 3 Encounters:   07/07/24 124 kg (273 lb 9.6 oz)   06/18/24 117 kg (258 lb 9.6 oz)   06/02/24 119 kg (263 lb 0.1 oz)     Patient states that she was taking torsemide and Aldactone at home but despite that she was gaining weight again and got concerned her weight today is 271 pounds on her previous discharge was down to 252-258 pounds.  Hold oral torsemide placed on Lasix 60 mg IV twice daily continue Aldactone orally.  Replace potassium and magnesium and continue diuresis Lasix dose was increased to 80 mg IV twice daily yesterday however no significant diuretic response and no changes in standing weight.  Will discontinue Lasix and switch to Bumex 1 mg IV twice daily.  Had negative I's and O's in the last 24 hours however no improvement in weights.    Continue to monitor daily standing weight and strict intake output . 2D echo done 05/2024 was normal EF.  Continue with current dose of diuretics.  Has been diuresing well and has been negative consistently in the last 48 hours however no decline in weight.  Remains hemodynamically stable with improved breathing.  Likely can transition to oral diuretics in the next 24 hours.            Hyperammonemia (HCC)  Assessment & Plan  Hyperammonemia due to liver cirrhosis, evidenced by increasing ammonia: 89->124, treated with lactulose, trending ammonia level, and neuro checks.  Currently mentation appears at baseline.  Continue with lactulose.  Dose decreased diuretic secondary to multiple bowel movements in the last 24 hours..       Pancytopenia (HCC)  Assessment & Plan  Patient has chronic pancytopenia due to liver issues.  Patient has a sepsis alert however  she does not have sepsis or SIRS at this time.  No acute infection and this is all secondary to her chronic liver disease    Hypokalemia  Assessment & Plan  Replaced.  Continue to monitor electrolytes closely on current diuretics.  Potassium has normalized.    Primary hypertension  Assessment & Plan  blood pressures currently well-controlled.cont diuresis    Other cirrhosis of liver (HCC)  Assessment & Plan  She has known history of liver cirrhosis now presents with ammonia level of 89  She states she was taking lactulose however stopped Xifaxan at home as she was not having much response from that.  Recommended to her to continue both medications and resume both and recheck ammonia level in a.m.  Lactulose to 30 g oral increased to 4 times daily.  Has not had any bowel movements in last 24 hours.  Will give 1 additional dose of MiraLAX as well.  Ammonia level has increased however mentation remains the same.  MELD score is 10.               VTE Pharmacologic Prophylaxis: VTE Score: 1 High Risk (Score >/= 5) - Pharmacological DVT Prophylaxis Ordered: enoxaparin (Lovenox). Sequential Compression Devices Ordered.    Mobility:   Basic Mobility Inpatient Raw Score: 22  JH-HLM Goal: 7: Walk 25 feet or more  JH-HLM Achieved: 7: Walk 25 feet or more  JH-HLM Goal achieved. Continue to encourage appropriate mobility.    Patient Centered Rounds: I performed bedside rounds with nursing staff today.   Discussions with Specialists or Other Care Team Provider: jean nursing     Education and Discussions with Family / Patient: jean fraser     Total Time Spent on Date of Encounter in care of patient: 20 mins. This time was spent on one or more of the following: performing physical exam; counseling and coordination of care; obtaining or reviewing history; documenting in the medical record; reviewing/ordering tests, medications or procedures; communicating with other healthcare professionals and discussing with patient's  family/caregivers.    Current Length of Stay: 5 day(s)  Current Patient Status: Inpatient   Certification Statement: The patient will continue to require additional inpatient hospital stay due to ongoing IV steroids  Discharge Plan: Anticipate discharge tomorrow to home.    Code Status: Level 1 - Full Code    Subjective:   Patient seen and examined at bedside.  Reported multiple episodes of diarrhea yesterday.  No acute events overnight.  Remains hemodynamically stable.  Denies any shortness of breath or worsening leg swelling.    Objective:     Vitals:   Temp (24hrs), Av.6 °F (36.4 °C), Min:97.3 °F (36.3 °C), Max:97.7 °F (36.5 °C)    Temp:  [97.3 °F (36.3 °C)-97.7 °F (36.5 °C)] 97.3 °F (36.3 °C)  HR:  [83-89] 83  Resp:  [16] 16  BP: (120-129)/(62-83) 122/67  SpO2:  [92 %-98 %] 98 %  Body mass index is 45.53 kg/m².     Input and Output Summary (last 24 hours):     Intake/Output Summary (Last 24 hours) at 2024 1245  Last data filed at 2024 1055  Gross per 24 hour   Intake 800 ml   Output 2850 ml   Net -2050 ml       Physical Exam:   Physical Exam  Constitutional:       General: She is not in acute distress.     Appearance: She is obese.   HENT:      Head: Normocephalic.      Nose: Nose normal.      Mouth/Throat:      Mouth: Mucous membranes are moist.   Eyes:      Extraocular Movements: Extraocular movements intact.      Pupils: Pupils are equal, round, and reactive to light.   Cardiovascular:      Rate and Rhythm: Normal rate and regular rhythm.   Pulmonary:      Comments: Breath sounds symmetrical bilateral lung fields with no rales appreciated.  Abdominal:      General: Bowel sounds are normal. There is no distension.      Palpations: Abdomen is soft.      Tenderness: There is no abdominal tenderness.   Musculoskeletal:      Cervical back: Neck supple.      Right lower leg: Edema present.      Left lower leg: Edema present.      Comments: Improving swelling bilateral lower extremity.   Neurological:       General: No focal deficit present.      Mental Status: She is alert and oriented to person, place, and time. Mental status is at baseline.      Comments: No asterixis   Psychiatric:         Mood and Affect: Mood normal.          Additional Data:     Labs:  Results from last 7 days   Lab Units 07/02/24  1054   WBC Thousand/uL 3.45*   HEMOGLOBIN g/dL 11.9   HEMATOCRIT % 35.8   PLATELETS Thousands/uL 93*     Results from last 7 days   Lab Units 07/06/24  0514   SODIUM mmol/L 135   POTASSIUM mmol/L 3.7   CHLORIDE mmol/L 99   CO2 mmol/L 27   BUN mg/dL 12   CREATININE mg/dL 0.86   ANION GAP mmol/L 9   CALCIUM mg/dL 8.9   ALBUMIN g/dL 3.3*   TOTAL BILIRUBIN mg/dL 1.47*   ALK PHOS U/L 196*   ALT U/L 18   AST U/L 51*   GLUCOSE RANDOM mg/dL 106     Results from last 7 days   Lab Units 07/06/24  0547   INR  1.26*     Results from last 7 days   Lab Units 07/02/24  1215   POC GLUCOSE mg/dl 91               Lines/Drains:  Invasive Devices       Peripheral Intravenous Line  Duration             Peripheral IV 07/03/24 Left Antecubital 3 days    Peripheral IV 07/04/24 Dorsal (posterior);Right Hand 2 days                          Imaging: No pertinent imaging reviewed.    Recent Cultures (last 7 days):         Last 24 Hours Medication List:   Current Facility-Administered Medications   Medication Dose Route Frequency Provider Last Rate    acetaminophen  650 mg Oral Q6H PRN Caro S Suzanne, CRNP      amitriptyline  75 mg Oral HS Idalia Stuart MD      bumetanide  1 mg Intravenous BID Mar Oliva MD      enoxaparin  40 mg Subcutaneous Q24H [facility] Mar Oliva MD      folic acid  1 mg Oral Daily Idalia Stuart MD      gabapentin  100 mg Oral BID Idalia Stuart MD      hydroxychloroquine  200 mg Oral BID With Meals Idalia Stuart MD      lactulose  30 g Oral TID Mar Oliva MD      oxyCODONE  10 mg Oral Q6H PRN Idalia Stuart MD      prochlorperazine  5 mg Oral Q6H PRN Idalia Stuart MD      rifaximin  550 mg Oral Q12H [facility] Idalia Stuart MD       sertraline  100 mg Oral Daily Idalia Stuart MD      spironolactone  50 mg Oral Daily Idalia Stuart MD          Today, Patient Was Seen By: Mar Oliva MD    **Please Note: This note may have been constructed using a voice recognition system.**

## 2024-07-07 NOTE — PLAN OF CARE
Problem: PAIN - ADULT  Goal: Verbalizes/displays adequate comfort level or baseline comfort level  Description: Interventions:  - Encourage patient to monitor pain and request assistance  - Assess pain using appropriate pain scale0-10  - Administer analgesics based on type and severity of pain and evaluate response  - Implement non-pharmacological measures as appropriate and evaluate response  - Consider cultural and social influences on pain and pain management  - Notify physician/advanced practitioner if interventions unsuccessful or patient reports new pain  Outcome: Progressing     Problem: INFECTION - ADULT  Goal: Absence or prevention of progression during hospitalization  Description: INTERVENTIONS:  - Assess and monitor for signs and symptoms of infection  - Monitor lab/diagnostic results  - Monitor all insertion sites, i.e. indwelling lines, tubes, and drains  - Monitor endotracheal if appropriate and nasal secretions for changes in amount and color  - Banks appropriate cooling/warming therapies per order  - Administer medications as ordered  - Instruct and encourage patient and family to use good hand hygiene technique  - Identify and instruct in appropriate isolation precautions for identified infection/condition  Outcome: Progressing     Problem: SAFETY ADULT  Goal: Patient will remain free of falls  Description: INTERVENTIONS:  - Educate patient/family on patient safety including physical limitations  - Instruct patient to call for assistance with activity   - Consult OT/PT to assist with strengthening/mobility   - Keep Call bell within reach  - Keep bed low and locked with side rails adjusted as appropriate  - Keep care items and personal belongings within reach  Outcome: Progressing  Goal: Maintain or return to baseline ADL function  Description: INTERVENTIONS:  -  Assess patient's ability to carry out ADLs; assess patient's baseline for ADL function and identify physical deficits which impact  ability to perform ADLs (bathing, care of mouth/teeth, toileting, grooming, dressing, etc.)  - Assess/evaluate cause of self-care deficits   - Assess range of motion  - Assess patient's mobility; develop plan if impaired  - Assess patient's need for assistive devices and provide as appropriate  - Encourage maximum independence but intervene and supervise when necessary  - Involve family in performance of ADLs  - Assess for home care needs following discharge   - Consider OT consult to assist with ADL evaluation and planning for discharge  - Provide patient education as appropriate  Outcome: Progressing  Goal: Maintains/Returns to pre admission functional level  Description: INTERVENTIONS:  - Perform AM-PAC 6 Click Basic Mobility/ Daily Activity assessment daily.  - Set and communicate daily mobility goal to care team and patient/family/caregiver.   - Collaborate with rehabilitation services on mobility goals if consulted  - Perform Range of Motion 3 times a day.  - Reposition patient every 2 hours.  - Dangle patient 2 times a day  - Stand patient 2 times a day  - Ambulate patient 2 times a day  - Out of bed to chair 2 times a day   - Out of bed for meals 2 times a day  - Out of bed for toileting  - Record patient progress and toleration of activity level   Outcome: Progressing     Problem: DISCHARGE PLANNING  Goal: Discharge to home or other facility with appropriate resources  Description: INTERVENTIONS:  - Identify barriers to discharge w/patient and caregiver  - Arrange for needed discharge resources and transportation as appropriate  - Identify discharge learning needs (meds, wound care, etc.)  - Arrange for interpretive services to assist at discharge as needed  - Refer to Case Management Department for coordinating discharge planning if the patient needs post-hospital services based on physician/advanced practitioner order or complex needs related to functional status, cognitive ability, or social support  system  Outcome: Progressing     Problem: Knowledge Deficit  Goal: Patient/family/caregiver demonstrates understanding of disease process, treatment plan, medications, and discharge instructions  Description: Complete learning assessment and assess knowledge base.  Interventions:  - Provide teaching at level of understanding  - Provide teaching via preferred learning methods  Outcome: Progressing     Problem: CARDIOVASCULAR - ADULT  Goal: Maintains optimal cardiac output and hemodynamic stability  Description: INTERVENTIONS:  - Monitor I/O, vital signs and rhythm  - Monitor for S/S and trends of decreased cardiac output monitor intake on fluid restriction  - Administer and titrate ordered vasoactive medications to optimize hemodynamic stability  - Assess quality of pulses, skin color and temperature  - Assess for signs of decreased coronary artery perfusion  - Instruct patient to report change in severity of symptoms  Outcome: Progressing  Goal: Absence of cardiac dysrhythmias or at baseline rhythm  Description: INTERVENTIONS:  - Continuous cardiac monitoring, vital signs, obtain 12 lead EKG if ordered  - Administer antiarrhythmic and heart rate control medications as ordered  - Monitor electrolytes and administer replacement therapy as ordered  Outcome: Progressing     Problem: MOBILITY - ADULT  Goal: Maintain or return to baseline ADL function  Description: INTERVENTIONS:  -  Assess patient's ability to carry out ADLs; assess patient's baseline for ADL function and identify physical deficits which impact ability to perform ADLs (bathing, care of mouth/teeth, toileting, grooming, dressing, etc.)  - Assess/evaluate cause of self-care deficits   - Assess range of motion  - Assess patient's mobility; develop plan if impaired  - Assess patient's need for assistive devices and provide as appropriate  - Encourage maximum independence but intervene and supervise when necessary  - Involve family in performance of ADLs  -  Assess for home care needs following discharge   - Consider OT consult to assist with ADL evaluation and planning for discharge  - Provide patient education as appropriate  Outcome: Progressing  Goal: Maintains/Returns to pre admission functional level  Description: INTERVENTIONS:  - Perform AM-PAC 6 Click Basic Mobility/ Daily Activity assessment daily.  - Set and communicate daily mobility goal to care team and patient/family/caregiver.   - Collaborate with rehabilitation services on mobility goals if consulted  - Perform Range of Motion 3 times a day.  - Reposition patient every 2 hours.  - Dangle patient 2 times a day  - Stand patient 2 times a day  - Ambulate patient 2 times a day  - Out of bed to chair 2 times a day   - Out of bed for meals 2 times a day  - Out of bed for toileting  - Record patient progress and toleration of activity level   Outcome: Progressing

## 2024-07-07 NOTE — ASSESSMENT & PLAN NOTE
Replaced.  Continue to monitor electrolytes closely on current diuretics.  Potassium has normalized.

## 2024-07-08 VITALS
TEMPERATURE: 97.5 F | WEIGHT: 266.8 LBS | RESPIRATION RATE: 18 BRPM | HEIGHT: 65 IN | BODY MASS INDEX: 44.45 KG/M2 | OXYGEN SATURATION: 94 % | SYSTOLIC BLOOD PRESSURE: 119 MMHG | HEART RATE: 89 BPM | DIASTOLIC BLOOD PRESSURE: 67 MMHG

## 2024-07-08 LAB
ANION GAP SERPL CALCULATED.3IONS-SCNC: 5 MMOL/L (ref 4–13)
BUN SERPL-MCNC: 12 MG/DL (ref 5–25)
CALCIUM SERPL-MCNC: 8.6 MG/DL (ref 8.4–10.2)
CHLORIDE SERPL-SCNC: 93 MMOL/L (ref 96–108)
CO2 SERPL-SCNC: 40 MMOL/L (ref 21–32)
CREAT SERPL-MCNC: 0.9 MG/DL (ref 0.6–1.3)
GFR SERPL CREATININE-BSD FRML MDRD: 72 ML/MIN/1.73SQ M
GLUCOSE SERPL-MCNC: 104 MG/DL (ref 65–140)
POTASSIUM SERPL-SCNC: 2.9 MMOL/L (ref 3.5–5.3)
POTASSIUM SERPL-SCNC: 4 MMOL/L (ref 3.5–5.3)
SODIUM SERPL-SCNC: 138 MMOL/L (ref 135–147)

## 2024-07-08 PROCEDURE — 80048 BASIC METABOLIC PNL TOTAL CA: CPT | Performed by: INTERNAL MEDICINE

## 2024-07-08 PROCEDURE — 99239 HOSP IP/OBS DSCHRG MGMT >30: CPT | Performed by: INTERNAL MEDICINE

## 2024-07-08 PROCEDURE — 84132 ASSAY OF SERUM POTASSIUM: CPT | Performed by: INTERNAL MEDICINE

## 2024-07-08 RX ORDER — DAPSONE 25 MG/1
50 TABLET ORAL DAILY
Qty: 60 TABLET | Refills: 0 | Status: SHIPPED | OUTPATIENT
Start: 2024-07-08 | End: 2024-08-07

## 2024-07-08 RX ORDER — SERTRALINE HYDROCHLORIDE 100 MG/1
100 TABLET, FILM COATED ORAL DAILY
Qty: 30 TABLET | Refills: 0 | Status: SHIPPED | OUTPATIENT
Start: 2024-07-08

## 2024-07-08 RX ORDER — POTASSIUM CHLORIDE 20 MEQ/1
40 TABLET, EXTENDED RELEASE ORAL 2 TIMES DAILY
Status: DISCONTINUED | OUTPATIENT
Start: 2024-07-08 | End: 2024-07-08 | Stop reason: HOSPADM

## 2024-07-08 RX ORDER — AMITRIPTYLINE HYDROCHLORIDE 75 MG/1
75 TABLET ORAL
Qty: 30 TABLET | Refills: 0 | Status: SHIPPED | OUTPATIENT
Start: 2024-07-08

## 2024-07-08 RX ORDER — OXYCODONE HYDROCHLORIDE 5 MG/1
5 TABLET ORAL EVERY 6 HOURS PRN
Start: 2024-07-08 | End: 2024-07-19

## 2024-07-08 RX ORDER — POTASSIUM CHLORIDE 14.9 MG/ML
20 INJECTION INTRAVENOUS 2 TIMES DAILY
Status: COMPLETED | OUTPATIENT
Start: 2024-07-08 | End: 2024-07-08

## 2024-07-08 RX ORDER — OXYCODONE HYDROCHLORIDE 5 MG/1
5 TABLET ORAL EVERY 6 HOURS PRN
Status: DISCONTINUED | OUTPATIENT
Start: 2024-07-08 | End: 2024-07-08 | Stop reason: HOSPADM

## 2024-07-08 RX ORDER — TORSEMIDE 20 MG/1
20 TABLET ORAL 2 TIMES DAILY
Status: DISCONTINUED | OUTPATIENT
Start: 2024-07-08 | End: 2024-07-08 | Stop reason: HOSPADM

## 2024-07-08 RX ORDER — POLYETHYLENE GLYCOL 3350 17 G/17G
17 POWDER, FOR SOLUTION ORAL AS NEEDED
Qty: 10 EACH | Refills: 0 | Status: SHIPPED | OUTPATIENT
Start: 2024-07-08 | End: 2024-07-18

## 2024-07-08 RX ORDER — TORSEMIDE 20 MG/1
20 TABLET ORAL
Qty: 60 TABLET | Refills: 0 | Status: SHIPPED | OUTPATIENT
Start: 2024-07-08 | End: 2024-08-07

## 2024-07-08 RX ADMIN — OXYCODONE HYDROCHLORIDE 10 MG: 10 TABLET ORAL at 02:33

## 2024-07-08 RX ADMIN — SPIRONOLACTONE 50 MG: 25 TABLET ORAL at 08:54

## 2024-07-08 RX ADMIN — PROCHLORPERAZINE MALEATE 5 MG: 10 TABLET ORAL at 13:28

## 2024-07-08 RX ADMIN — OXYCODONE HYDROCHLORIDE 5 MG: 5 TABLET ORAL at 13:28

## 2024-07-08 RX ADMIN — GABAPENTIN 100 MG: 100 CAPSULE ORAL at 08:54

## 2024-07-08 RX ADMIN — RIFAXIMIN 550 MG: 550 TABLET ORAL at 08:54

## 2024-07-08 RX ADMIN — FOLIC ACID 1 MG: 1 TABLET ORAL at 08:54

## 2024-07-08 RX ADMIN — POTASSIUM CHLORIDE 40 MEQ: 1500 TABLET, EXTENDED RELEASE ORAL at 09:02

## 2024-07-08 RX ADMIN — POTASSIUM CHLORIDE 20 MEQ: 14.9 INJECTION, SOLUTION INTRAVENOUS at 11:48

## 2024-07-08 RX ADMIN — SERTRALINE HYDROCHLORIDE 100 MG: 100 TABLET ORAL at 08:54

## 2024-07-08 RX ADMIN — ENOXAPARIN SODIUM 40 MG: 40 INJECTION SUBCUTANEOUS at 11:29

## 2024-07-08 RX ADMIN — PROCHLORPERAZINE MALEATE 5 MG: 10 TABLET ORAL at 02:33

## 2024-07-08 RX ADMIN — POTASSIUM CHLORIDE 20 MEQ: 14.9 INJECTION, SOLUTION INTRAVENOUS at 09:02

## 2024-07-08 RX ADMIN — TORSEMIDE 20 MG: 20 TABLET ORAL at 08:54

## 2024-07-08 RX ADMIN — LACTULOSE 30 G: 20 SOLUTION ORAL at 09:02

## 2024-07-08 RX ADMIN — HYDROXYCHLOROQUINE SULFATE 200 MG: 200 TABLET, FILM COATED ORAL at 08:54

## 2024-07-08 NOTE — UTILIZATION REVIEW
Continued Stay Review    Date: 7/6/24                           Current Patient Class: IP  Current Level of Care: MS    HPI:53 y.o. female initially admitted on 7/2/24 - DX:  Acute on chronic diastolic (congestive) heart failure  / Hypokalemia / Other cirrhosis of liver     Assessment/Plan:   7/6/24: No improvement in weight despite diuresis in the last 24 hours. Denies any worsening shortness of breath abdominal pain   Had negative I's and O's in the last 24 hours however no improvement in weights. When asked 1 dose of Zaroxolyn to aid diuretic response.  Potassium has normalized. MELD score is 10. I/O net -80; B/L LE edema; Albumin 3.3  Plan: cont iv bumex; pain control (see below); monitor labs        Vital Signs (last 3 days)       Date/Time Temp Pulse Resp BP MAP (mmHg) SpO2 O2 Device Pain    07/06/24 22:35:36 97.7 °F (36.5 °C) 89 -- 120/62 81 93 % -- --    07/06/24 2044 -- -- -- -- -- -- -- 3    07/06/24 2036 -- -- -- -- -- -- None (Room air) --    07/06/24 1825 -- -- -- -- -- -- -- 8 07/06/24 15:52:41 97.7 °F (36.5 °C) 89 -- 129/83 98 92 % -- --    07/06/24 1148 -- -- -- -- -- -- -- 8 07/06/24 10:27:45 -- 88 -- 130/82 98 94 % -- --    07/06/24 0830 -- -- -- -- -- 93 % None (Room air) --    07/06/24 07:57:23 -- 89 18 129/75 93 92 % -- --    07/06/24 0538 -- -- -- -- -- -- -- 9    07/05/24 23:27:03 97.3 °F (36.3 °C) 89 19 130/75 93 91 % -- --    07/05/24 2016 -- -- -- -- -- 93 % None (Room air) 8    07/05/24 14:07:47 -- 97 -- 147/91 110 94 % -- --    07/05/24 1141 -- -- -- -- -- -- -- 5    07/05/24 0846 -- -- -- -- -- 93 % None (Room air) No Pain    07/05/24 07:29:12 97.5 °F (36.4 °C) 89 18 131/73 92 93 % -- --    07/05/24 0429 -- -- -- -- -- -- -- 8          Weight (last 2 days)       Date/Time Weight    07/08/24 0505 121 (266.8)    07/07/24 0351 124 (273.6)    07/06/24 0600 124 (273.8)              Pertinent Labs/Diagnostic Results:   Radiology:  XR chest 1 view portable   ED Interpretation by Cheng ECHOLS  DO Cata (07/02 1139)   No acute findings.       Final Interpretation by Cristian Kenney MD (07/03 1034)      Slight vascular congestion.            Workstation performed: [id number]           Cardiology:  ECG 12 lead   Final Result by Keanu Mcneil MD (07/02 1451)   Normal sinus rhythm   Minimal voltage criteria for LVH, may be normal variant ( R in aVL )   Borderline ECG   When compared with ECG of 11-JUN-2024 11:51,   No significant change was found   Confirmed by Keanu Mcneil ([id number]) on 7/2/2024 2:50:59 PM           Results from last 7 days   Lab Units 07/02/24  1054   WBC Thousand/uL 3.45*   HEMOGLOBIN g/dL 11.9   HEMATOCRIT % 35.8   PLATELETS Thousands/uL 93*        Results from last 7 days   Lab Units 07/08/24  0451 07/06/24  0514 07/05/24  0547 07/04/24  0440 07/03/24  0505 07/02/24  1852 07/02/24  1054   SODIUM mmol/L 138 135 137 137 139   < > 141   POTASSIUM mmol/L 2.9* 3.7 3.2* 3.5 3.3*   < > 2.8*   CHLORIDE mmol/L 93* 99 99 99 101   < > 102   CO2 mmol/L 40* 27 33* 31 32   < > 32   ANION GAP mmol/L 5 9 5 7 6   < > 7   BUN mg/dL 12 12 12 9 9   < > 7   CREATININE mg/dL 0.90 0.86 0.85 0.84 0.89   < > 0.78   EGFR ml/min/1.73sq m 72 76 77 79 73   < > 86   CALCIUM mg/dL 8.6 8.9 8.7 8.8 8.7   < > 8.6   MAGNESIUM mg/dL  --   --  1.8* 1.9 2.0  --  1.5*    < > = values in this interval not displayed.     Results from last 7 days   Lab Units 07/06/24  0514 07/04/24  0440 07/03/24  0505 07/02/24  1054   AST U/L 51*  --   --  42*   ALT U/L 18  --   --  25   ALK PHOS U/L 196*  --   --  199*   TOTAL PROTEIN g/dL 8.3  --   --  8.0   ALBUMIN g/dL 3.3*  --   --  3.4*   TOTAL BILIRUBIN mg/dL 1.47*  --   --  1.61*   BILIRUBIN DIRECT mg/dL  --   --   --  0.44*   AMMONIA umol/L  --  124* 78* 89*     Results from last 7 days   Lab Units 07/02/24  1215   POC GLUCOSE mg/dl 91     Results from last 7 days   Lab Units 07/08/24  0451 07/06/24  0514 07/05/24  0547 07/04/24  0440 07/03/24  0505 07/02/24  1852 07/02/24  1054    GLUCOSE RANDOM mg/dL 104 106 143* 126 133 134 58*        Results from last 7 days   Lab Units 07/06/24  0547   PROTIME seconds 16.1*   INR  1.26*        Results from last 7 days   Lab Units 07/02/24  1054   BNP pg/mL 34             Medications:   Scheduled Medications:  amitriptyline, 75 mg, Oral, HS  enoxaparin, 40 mg, Subcutaneous, Q24H MIGUEL  folic acid, 1 mg, Oral, Daily  gabapentin, 100 mg, Oral, BID  hydroxychloroquine, 200 mg, Oral, BID With Meals  lactulose, 30 g, Oral, TID  potassium chloride, 40 mEq, Oral, BID  rifaximin, 550 mg, Oral, Q12H MIGUEL  sertraline, 100 mg, Oral, Daily  spironolactone, 50 mg, Oral, Daily  bumetanide (BUMEX) injection 1 mg, Intravenous BID      Continuous IV Infusions: None       PRN Meds:  acetaminophen, 650 mg, Oral, Q6H PRN  (7/6 recd x1)   oxyCODONE, 5 mg, Oral, Q6H PRN  (7/6 recd x3)   prochlorperazine, 5 mg, Oral, Q6H PRN  oxyCODONE (ROXICODONE) immediate release tablet 10 mg, Oral Q6 PRN   (7/6 recd x3)         Discharge Plan: D    Network Utilization Review Department  ATTENTION: Please call with any questions or concerns to [phone] and carefully listen to the prompts so that you are directed to the right person. All voicemails are confidential.   For Discharge needs, contact Care Management DC Support Team at [phone] opt. 2  Send all requests for admission clinical reviews, approved or denied determinations and any other requests to dedicated fax number below belonging to the campus where the patient is receiving treatment. List of dedicated fax numbers for the Facilities:  FACILITY NAME UR FAX NUMBER   ADMISSION DENIALS (Administrative/Medical Necessity) [phone]   DISCHARGE SUPPORT TEAM (NETWORK) [phone]   PARENT CHILD HEALTH (Maternity/NICU/Pediatrics) [phone]   [facility] [phone]   [facility] [phone]   [facility] [phone]   [facility] [phone]   [facility] [phone]   [facility] [phone]   [facility] [phone]   [facility] [phone]   [facility] [phone]   [facility] [phone]   [facility] [phone]   [facility] [phone]

## 2024-07-08 NOTE — ASSESSMENT & PLAN NOTE
Wt Readings from Last 3 Encounters:   07/08/24 121 kg (266 lb 12.8 oz)   06/18/24 117 kg (258 lb 9.6 oz)   06/02/24 119 kg (263 lb 0.1 oz)     Patient states that she was taking torsemide and Aldactone at home but despite that she was gaining weight again and got concerned her weight today is 271 pounds on her previous discharge was down to 252-258 pounds.  Hold oral torsemide placed on Lasix 60 mg IV twice daily continue Aldactone orally.  Replace potassium and magnesium and continue diuresis Lasix dose was increased to 80 mg IV twice daily yesterday however no significant diuretic response and no changes in standing weight.  Will discontinue Lasix and switch to Bumex 1 mg IV twice daily.  Had negative I's and O's in the last 24 hours however no improvement in weights.    Continue to monitor daily standing weight and strict intake output . 2D echo done 05/2024 was normal EF.  Continue with current dose of diuretics.  Has been diuresing well and has been negative consistently in the last 48 hours however no decline in weight.  Remains hemodynamically stable with improved breathing.  Transition to home dose of torsemide today.  Weight has decreased from   273-266 ON DC

## 2024-07-08 NOTE — UTILIZATION REVIEW
Continued Stay Review    Date: 7/6/24  and 7/7/24                         Current Patient Class: IP  Current Level of Care: MS    HPI:53 y.o. female initially admitted on 7/2/24 - DX:  Acute on chronic diastolic (congestive) heart failure  / Hypokalemia / Other cirrhosis of liver     Assessment/Plan:   7/6/24: No improvement in weight despite diuresis in the last 24 hours. Denies any worsening shortness of breath abdominal pain   Had negative I's and O's in the last 24 hours however no improvement in weights. When asked 1 dose of Zaroxolyn to aid diuretic response.  Potassium has normalized. MELD score is 10. I/O net -80; B/L LE edema; Albumin 3.3  Plan: cont iv bumex; rec'd zaroxlyn po x1; pain / nausea control (see below); monitor labs; cont lactulose    7/7/24:   Reported multiple episodes of diarrhea yesterday. Denies any shortness of breath or worsening leg swelling. Has been diuresing well and has been negative consistently in the last 48 hours however no decline in weight. Remains hemodynamically stable with improved breathing. Lactulose dose decreased diuretic secondary to multiple bowel movements in the last 24 hours. I/O net -2050; B/L Edema Improving.  Plan: cont iv bumex; pain / nausea control (see below); monitor labs; cont lactulose      Vital Signs (last 3 days)       Date/Time Temp Pulse Resp BP MAP (mmHg) SpO2 O2 Device Pain    07/08/24 13:55:25 97.5 °F (36.4 °C) 89 18 119/67 84 94 % -- --    07/08/24 1328 -- -- -- -- -- -- -- 5    07/08/24 0902 -- -- -- -- -- 93 % None (Room air) No Pain    07/08/24 07:40:28 97.3 °F (36.3 °C) -- 18 132/69 90 -- -- --    07/08/24 0233 -- -- -- -- -- -- -- 9    07/07/24 22:39:32 97.3 °F (36.3 °C) 90 16 124/63 83 93 % -- --    07/07/24 2100 -- -- -- -- -- -- None (Room air) --    07/07/24 1949 -- -- -- -- -- -- -- 9    07/07/24 15:24:38 97.7 °F (36.5 °C) 94 16 124/62 83 94 % -- --    07/07/24 1150 -- -- -- -- -- -- -- 5    07/07/24 0849 -- -- -- -- -- 98 % None (Room  air) 2    07/07/24 07:25:19 97.3 °F (36.3 °C) 83 16 122/67 85 94 % -- --    07/07/24 0407 -- -- -- -- -- -- -- 8    07/06/24 22:35:36 97.7 °F (36.5 °C) 89 -- 120/62 81 93 % -- --    07/06/24 2044 -- -- -- -- -- -- -- 3    07/06/24 2036 -- -- -- -- -- -- None (Room air) --    07/06/24 1825 -- -- -- -- -- -- -- 8    07/06/24 15:52:41 97.7 °F (36.5 °C) 89 -- 129/83 98 92 % -- --    07/06/24 1148 -- -- -- -- -- -- -- 8    07/06/24 10:27:45 -- 88 -- 130/82 98 94 % -- --    07/06/24 0830 -- -- -- -- -- 93 % None (Room air) --    07/06/24 07:57:23 -- 89 18 129/75 93 92 % -- --    07/06/24 0538 -- -- -- -- -- -- -- 9    07/05/24 23:27:03 97.3 °F (36.3 °C) 89 19 130/75 93 91 % -- --    07/05/24 2016 -- -- -- -- -- 93 % None (Room air) 8    07/05/24 14:07:47 -- 97 -- 147/91 110 94 % -- --    07/05/24 1141 -- -- -- -- -- -- -- 5    07/05/24 0846 -- -- -- -- -- 93 % None (Room air) No Pain    07/05/24 07:29:12 97.5 °F (36.4 °C) 89 18 131/73 92 93 % -- --    07/05/24 0429 -- -- -- -- -- -- -- 8          Weight (last 2 days)       Date/Time Weight    07/08/24 0505 121 (266.8)    07/07/24 0351 124 (273.6)    07/06/24 0600 124 (273.8)              Pertinent Labs/Diagnostic Results:   Radiology:  XR chest 1 view portable   ED Interpretation by Cheng Ivy DO (07/02 1139)   No acute findings.       Final Interpretation by Cristian Kenney MD (07/03 1034)      Slight vascular congestion.            Workstation performed: [id number]           Cardiology:  ECG 12 lead   Final Result by Keanu Mcneil MD (07/02 5253)   Normal sinus rhythm   Minimal voltage criteria for LVH, may be normal variant ( R in aVL )   Borderline ECG   When compared with ECG of 11-JUN-2024 11:51,   No significant change was found   Confirmed by Keanu Mcneil ([id number]) on 7/2/2024 2:50:59 PM              Results from last 7 days   Lab Units 07/02/24  1054   WBC Thousand/uL 3.45*   HEMOGLOBIN g/dL 11.9   HEMATOCRIT % 35.8   PLATELETS Thousands/uL 93*         Results from last 7 days   Lab Units 07/08/24 0451 07/06/24  0514 07/05/24  0547 07/04/24 0440 07/03/24  0505 07/02/24 1852 07/02/24  1054   SODIUM mmol/L 138 135 137 137 139   < > 141   POTASSIUM mmol/L 2.9* 3.7 3.2* 3.5 3.3*   < > 2.8*   CHLORIDE mmol/L 93* 99 99 99 101   < > 102   CO2 mmol/L 40* 27 33* 31 32   < > 32   ANION GAP mmol/L 5 9 5 7 6   < > 7   BUN mg/dL 12 12 12 9 9   < > 7   CREATININE mg/dL 0.90 0.86 0.85 0.84 0.89   < > 0.78   EGFR ml/min/1.73sq m 72 76 77 79 73   < > 86   CALCIUM mg/dL 8.6 8.9 8.7 8.8 8.7   < > 8.6   MAGNESIUM mg/dL  --   --  1.8* 1.9 2.0  --  1.5*    < > = values in this interval not displayed.     Results from last 7 days   Lab Units 07/06/24 0514 07/04/24 0440 07/03/24  0505 07/02/24  1054   AST U/L 51*  --   --  42*   ALT U/L 18  --   --  25   ALK PHOS U/L 196*  --   --  199*   TOTAL PROTEIN g/dL 8.3  --   --  8.0   ALBUMIN g/dL 3.3*  --   --  3.4*   TOTAL BILIRUBIN mg/dL 1.47*  --   --  1.61*   BILIRUBIN DIRECT mg/dL  --   --   --  0.44*   AMMONIA umol/L  --  124* 78* 89*     Results from last 7 days   Lab Units 07/02/24  1215   POC GLUCOSE mg/dl 91     Results from last 7 days   Lab Units 07/08/24 0451 07/06/24 0514 07/05/24 0547 07/04/24 0440 07/03/24  0505 07/02/24 1852 07/02/24  1054   GLUCOSE RANDOM mg/dL 104 106 143* 126 133 134 58*        Results from last 7 days   Lab Units 07/06/24  0547   PROTIME seconds 16.1*   INR  1.26*        Results from last 7 days   Lab Units 07/02/24  1054   BNP pg/mL 34             Medications:   Scheduled Medications:  amitriptyline, 75 mg, Oral, HS  enoxaparin, 40 mg, Subcutaneous, Q24H MIGUEL  folic acid, 1 mg, Oral, Daily  gabapentin, 100 mg, Oral, BID  hydroxychloroquine, 200 mg, Oral, BID With Meals  lactulose, 30 g, Oral, TID  potassium chloride, 40 mEq, Oral, BID  rifaximin, 550 mg, Oral, Q12H MIGUEL  sertraline, 100 mg, Oral, Daily  spironolactone, 50 mg, Oral, Daily  bumetanide (BUMEX) injection 1 mg. Intravenous  BID    metolazone (ZAROXOLYN) tablet 2.5 mg  Dose: 2.5 mg  Freq: Once Route: PO  Start: 07/06/24 1245 End: 07/06/24 1315      Continuous IV Infusions: None     PRN Meds:  acetaminophen, 650 mg, Oral, Q6H PRN  (7/6 recd x1)   oxyCODONE, 10 mg, Oral, Q6H PRN   (7/6 recd x3)   (7/7/recd x3)   prochlorperazine, 5 mg, Oral, Q6H PRN  (7/6 recd x3) (7/7/recd x3)         Discharge Plan: TBD    Network Utilization Review Department  ATTENTION: Please call with any questions or concerns to [phone] and carefully listen to the prompts so that you are directed to the right person. All voicemails are confidential.   For Discharge needs, contact Care Management DC Support Team at [phone] opt. 2  Send all requests for admission clinical reviews, approved or denied determinations and any other requests to dedicated fax number below belonging to the [place] where the patient is receiving treatment. List of dedicated fax numbers for the Facilities:  FACILITY NAME UR FAX NUMBER   ADMISSION DENIALS (Administrative/Medical Necessity) [phone]   DISCHARGE SUPPORT TEAM (NETWORK) [phone]   PARENT CHILD HEALTH (Maternity/NICU/Pediatrics) [phone]   [facility] [phone]   [facility] [phone]   [facility] [phone]   [facility] [phone]   [facility] [phone]   [facility] [phone]   [facility] [phone]   [facility] [phone]   [facility] [phone]   [facility] [phone]   [facility] [phone]   [facility] [phone]

## 2024-07-08 NOTE — ASSESSMENT & PLAN NOTE
She has known history of liver cirrhosis now presents with ammonia level of 89  She states she was taking lactulose however stopped Xifaxan at home as she was not having much response from that.  Recommended to her to continue both medications and resume both and recheck ammonia level in a.m.  Lactulose to 30 g oral increased to 4 times daily.  Multiple bowel movements in the last 48 hours with improvement in mentation.  Will decrease dose of lactulose to home dosing.

## 2024-07-08 NOTE — NURSING NOTE
Reviewed fluid restriction, heart failure zones, handout provided/education. [facility]

## 2024-07-08 NOTE — ASSESSMENT & PLAN NOTE
Hyperammonemia due to liver cirrhosis, evidenced by increasing ammonia: 89->124, treated with lactulose, trending ammonia level, and neuro checks.  Currently mentation appears at baseline.  Continue with lactulose.  Dose decreased diuretic secondary to multiple bowel movements in the last 24 hours..  Resume home dosing on discharge.

## 2024-07-08 NOTE — DISCHARGE SUMMARY
[facility]  Discharge- Bailey Olsen 1970, 53 y.o. female MRN: [id number]  Unit/Bed#: [facility]-01 Encounter: [id number]  Primary Care Provider: Nayely Brown DO   Date and time admitted to hospital: 7/2/2024 10:21 AM    * Acute on chronic diastolic (congestive) heart failure (HCC)  Assessment & Plan  Wt Readings from Last 3 Encounters:   07/08/24 121 kg (266 lb 12.8 oz)   06/18/24 117 kg (258 lb 9.6 oz)   06/02/24 119 kg (263 lb 0.1 oz)     Patient states that she was taking torsemide and Aldactone at home but despite that she was gaining weight again and got concerned her weight today is 271 pounds on her previous discharge was down to 252-258 pounds.  Hold oral torsemide placed on Lasix 60 mg IV twice daily continue Aldactone orally.  Replace potassium and magnesium and continue diuresis Lasix dose was increased to 80 mg IV twice daily yesterday however no significant diuretic response and no changes in standing weight.  Will discontinue Lasix and switch to Bumex 1 mg IV twice daily.  Had negative I's and O's in the last 24 hours however no improvement in weights.    Continue to monitor daily standing weight and strict intake output . 2D echo done 05/2024 was normal EF.  Continue with current dose of diuretics.  Has been diuresing well and has been negative consistently in the last 48 hours however no decline in weight.  Remains hemodynamically stable with improved breathing.  Transition to home dose of torsemide today.  Weight has decreased from   273-266 ON DC         Hyperammonemia (HCC)  Assessment & Plan  Hyperammonemia due to liver cirrhosis, evidenced by increasing ammonia: 89->124, treated with lactulose, trending ammonia level, and neuro checks.  Currently mentation appears at baseline.  Continue with lactulose.  Dose decreased diuretic secondary to multiple bowel movements in the last 24 hours..  Resume home dosing on discharge.       Pancytopenia (HCC)  Assessment  & Plan  Patient has chronic pancytopenia due to liver issues.  Patient has a sepsis alert however she does not have sepsis or SIRS at this time.  No acute infection and this is all secondary to her chronic liver disease    Hypokalemia  Assessment & Plan  Replaced.  Potassium at 2.9 today.  Repeated after replacement.    Primary hypertension  Assessment & Plan  blood pressures currently well-controlled.cont diuresis    Other cirrhosis of liver (HCC)  Assessment & Plan  She has known history of liver cirrhosis now presents with ammonia level of 89  She states she was taking lactulose however stopped Xifaxan at home as she was not having much response from that.  Recommended to her to continue both medications and resume both and recheck ammonia level in a.m.  Lactulose to 30 g oral increased to 4 times daily.  Multiple bowel movements in the last 48 hours with improvement in mentation.  Will decrease dose of lactulose to home dosing.        Medical Problems       Resolved Problems  Date Reviewed: 7/3/2024   None       Discharging Physician / Practitioner: Mar Oliva MD  PCP: Nayely Brown DO  Admission Date:   Admission Orders (From admission, onward)       Ordered        07/02/24 1211  INPATIENT ADMISSION  Once                          Discharge Date: 07/08/24    Consultations During Hospital Stay:  na    Procedures Performed:   na    Significant Findings / Test Results:   na    Incidental Findings:   nA       Test Results Pending at Discharge (will require follow up):   na     Outpatient Tests Requested:  na    Complications:  NONE     Reason for Admission: Weight gain and leg swelling    Hospital Course:   Bailey Olsen is a 53 y.o. female patient who originally presented to the hospital on 7/2/2024 due to acute on chronic volume overload in the setting of underlying liver cirrhosis.  Also had hypoalbuminemia.  Treated with IV diuretics and lactulose with improvement in weight, lower extremity swelling  "and breathing.  Also received multiple doses of lactulose with subsequent 3-4 bowel movements daily.  Deemed stable for transition to oral diuretics and discharged home on home dose of lactulose.            Please see above list of diagnoses and related plan for additional information.     Condition at Discharge: stable    Discharge Day Visit / Exam:   Subjective: Dammann at bedside.  Does not have any complaints.  Multiple bowel movements reported by nursing yesterday.  Vitals: Blood Pressure: 132/69 (07/08/24 0740)  Pulse: 90 (07/07/24 2239)  Temperature: (!) 97.3 °F (36.3 °C) (07/08/24 0740)  Temp Source: Oral (07/02/24 1029)  Respirations: 18 (07/08/24 0740)  Height: 5' 5\" (165.1 cm) (07/02/24 1332)  Weight - Scale: 121 kg (266 lb 12.8 oz) (07/08/24 0505)  SpO2: 93 % (07/08/24 0902)  Exam:   Physical Exam  Constitutional:       General: She is not in acute distress.  HENT:      Head: Normocephalic.      Nose: Nose normal.      Mouth/Throat:      Mouth: Mucous membranes are moist.   Eyes:      Extraocular Movements: Extraocular movements intact.      Pupils: Pupils are equal, round, and reactive to light.   Cardiovascular:      Rate and Rhythm: Normal rate and regular rhythm.      Pulses: Normal pulses.   Pulmonary:      Effort: Pulmonary effort is normal.      Breath sounds: Normal breath sounds.   Abdominal:      General: Abdomen is flat. Bowel sounds are normal.      Palpations: Abdomen is soft.   Musculoskeletal:      Cervical back: Neck supple.      Left lower leg: No edema.      Comments: Decreased leg swelling bilaterally   Skin:     General: Skin is warm.   Neurological:      General: No focal deficit present.      Mental Status: She is alert and oriented to person, place, and time. Mental status is at baseline.   Psychiatric:         Mood and Affect: Mood normal.          Discussion with Family: Attempted to update  () via phone. Left voicemail.     Discharge instructions/Information " to patient and family:   See after visit summary for information provided to patient and family.      Provisions for Follow-Up Care:  See after visit summary for information related to follow-up care and any pertinent home health orders.      Mobility at time of Discharge:   Basic Mobility Inpatient Raw Score: 22  JH-HLM Goal: 7: Walk 25 feet or more  JH-HLM Achieved: 7: Walk 25 feet or more  HLM Goal achieved. Continue to encourage appropriate mobility.     Disposition:   Home    Planned Readmission: NO     Discharge Statement:  I spent 30 minutes discharging the patient. This time was spent on the day of discharge. I had direct contact with the patient on the day of discharge. Greater than 50% of the total time was spent examining patient, answering all patient questions, arranging and discussing plan of care with patient as well as directly providing post-discharge instructions.  Additional time then spent on discharge activities.    Discharge Medications:  See after visit summary for reconciled discharge medications provided to patient and/or family.      **Please Note: This note may have been constructed using a voice recognition system**

## 2024-07-08 NOTE — PLAN OF CARE
Problem: PAIN - ADULT  Goal: Verbalizes/displays adequate comfort level or baseline comfort level  Description: Interventions:  - Encourage patient to monitor pain and request assistance  - Assess pain using appropriate pain scale0-10  - Administer analgesics based on type and severity of pain and evaluate response  - Implement non-pharmacological measures as appropriate and evaluate response  - Consider cultural and social influences on pain and pain management  - Notify physician/advanced practitioner if interventions unsuccessful or patient reports new pain  Outcome: Progressing     Problem: INFECTION - ADULT  Goal: Absence or prevention of progression during hospitalization  Description: INTERVENTIONS:  - Assess and monitor for signs and symptoms of infection  - Monitor lab/diagnostic results  - Monitor all insertion sites, i.e. indwelling lines, tubes, and drains  - Monitor endotracheal if appropriate and nasal secretions for changes in amount and color  - [place] appropriate cooling/warming therapies per order  - Administer medications as ordered  - Instruct and encourage patient and family to use good hand hygiene technique  - Identify and instruct in appropriate isolation precautions for identified infection/condition  Outcome: Progressing     Goal: Maintain or return to baseline ADL function  Description: INTERVENTIONS:  -  Assess patient's ability to carry out ADLs; assess patient's baseline for ADL function and identify physical deficits which impact ability to perform ADLs (bathing, care of mouth/teeth, toileting, grooming, dressing, etc.)  - Assess/evaluate cause of self-care deficits   - Assess range of motion  - Assess patient's mobility; develop plan if impaired  - Assess patient's need for assistive devices and provide as appropriate  - Encourage maximum independence but intervene and supervise when necessary  - Involve family in performance of ADLs  - Assess for home care needs following  discharge   - Consider OT consult to assist with ADL evaluation and planning for discharge  - Provide patient education as appropriate  Outcome: Progressing  Goal: Maintains/Returns to pre admission functional level  Description: INTERVENTIONS:  - Perform AM-PAC 6 Click Basic Mobility/ Daily Activity assessment daily.  - Set and communicate daily mobility goal to care team and patient/family/caregiver.   - Collaborate with rehabilitation services on mobility goals if consulted  - Perform Range of Motion 3 times a day.  - Reposition patient every 2 hours.  - Dangle patient 2 times a day  - Stand patient 2 times a day  - Ambulate patient 2 times a day  - Out of bed to chair 2 times a day   - Out of bed for meals 2 times a day  - Out of bed for toileting  - Record patient progress and toleration of activity level   Outcome: Progressing     Problem: DISCHARGE PLANNING  Goal: Discharge to home or other facility with appropriate resources  Description: INTERVENTIONS:  - Identify barriers to discharge w/patient and caregiver  - Arrange for needed discharge resources and transportation as appropriate  - Identify discharge learning needs (meds, wound care, etc.)  - Arrange for interpretive services to assist at discharge as needed  - Refer to Case Management Department for coordinating discharge planning if the patient needs post-hospital services based on physician/advanced practitioner order or complex needs related to functional status, cognitive ability, or social support system  Outcome: Progressing     Problem: Knowledge Deficit  Goal: Patient/family/caregiver demonstrates understanding of disease process, treatment plan, medications, and discharge instructions  Description: Complete learning assessment and assess knowledge base.  Interventions:  - Provide teaching at level of understanding  - Provide teaching via preferred learning methods  Outcome: Progressing     Problem: CARDIOVASCULAR - ADULT  Goal: Maintains  optimal cardiac output and hemodynamic stability  Description: INTERVENTIONS:  - Monitor I/O, vital signs and rhythm  - Monitor for S/S and trends of decreased cardiac output monitor intake on fluid restriction  - Administer and titrate ordered vasoactive medications to optimize hemodynamic stability  - Assess quality of pulses, skin color and temperature  - Assess for signs of decreased coronary artery perfusion  - Instruct patient to report change in severity of symptoms  Outcome: Progressing  Goal: Absence of cardiac dysrhythmias or at baseline rhythm  Description: INTERVENTIONS:  - Continuous cardiac monitoring, vital signs, obtain 12 lead EKG if ordered  - Administer antiarrhythmic and heart rate control medications as ordered  - Monitor electrolytes and administer replacement therapy as ordered  Outcome: Progressing     Problem: MOBILITY - ADULT  Goal: Maintain or return to baseline ADL function  Description: INTERVENTIONS:  -  Assess patient's ability to carry out ADLs; assess patient's baseline for ADL function and identify physical deficits which impact ability to perform ADLs (bathing, care of mouth/teeth, toileting, grooming, dressing, etc.)  - Assess/evaluate cause of self-care deficits   - Assess range of motion  - Assess patient's mobility; develop plan if impaired  - Assess patient's need for assistive devices and provide as appropriate  - Encourage maximum independence but intervene and supervise when necessary  - Involve family in performance of ADLs  - Assess for home care needs following discharge   - Consider OT consult to assist with ADL evaluation and planning for discharge  - Provide patient education as appropriate  Outcome: Progressing  Goal: Maintains/Returns to pre admission functional level  Description: INTERVENTIONS:  - Perform AM-PAC 6 Click Basic Mobility/ Daily Activity assessment daily.  - Set and communicate daily mobility goal to care team and patient/family/caregiver.   -  Collaborate with rehabilitation services on mobility goals if consulted  - Perform Range of Motion 3 times a day.  - Reposition patient every 2 hours.  - Dangle patient 2 times a day  - Stand patient 2 times a day  - Ambulate patient 2 times a day  - Out of bed to chair 2 times a day   - Out of bed for meals 2 times a day  - Out of bed for toileting  - Record patient progress and toleration of activity level   Outcome: Progressing

## 2024-07-09 NOTE — UTILIZATION REVIEW
NOTIFICATION OF ADMISSION DISCHARGE   This is a Notification of Discharge from [facility]. Please be advised that this patient has been discharge from our facility. Below you will find the admission and discharge date and time including the patient’s disposition.   UTILIZATION REVIEW CONTACT:  Елена Self  Utilization   Network Utilization Review Department  Phone: [phone] x carefully listen to the prompts. All voicemails are confidential.  Email: [email or web address]     ADMISSION INFORMATION  PRESENTATION DATE: 7/2/2024 10:21 AM  OBERVATION ADMISSION DATE: N/A  INPATIENT ADMISSION DATE: 7/2/24 12:11 PM   DISCHARGE DATE: 7/8/2024  3:54 PM   DISPOSITION:Home/Self Care    Network Utilization Review Department  ATTENTION: Please call with any questions or concerns to [phone] and carefully listen to the prompts so that you are directed to the right person. All voicemails are confidential.   For Discharge needs, contact Care Management DC Support Team at [phone] opt. 2  Send all requests for admission clinical reviews, approved or denied determinations and any other requests to dedicated fax number below belonging to the campus where the patient is receiving treatment. List of dedicated fax numbers for the Facilities:  FACILITY NAME UR FAX NUMBER   ADMISSION DENIALS (Administrative/Medical Necessity) [phone]   DISCHARGE SUPPORT TEAM ([facility]) [phone]   PARENT CHILD HEALTH (Maternity/NICU/Pediatrics) [phone]   [facility] [phone]   [facility] [phone]   On license of [facility] [phone]   General acute hospital [phone]   [facility] [phone]   [facility] [phone]   General acute hospital [phone]   [facility]  [phone]   [facility] [phone]   [facility] [phone]   [facility] [phone]   [facility] [phone]

## 2024-07-18 ENCOUNTER — APPOINTMENT (EMERGENCY)
Dept: CT IMAGING | Facility: HOSPITAL | Age: 54
End: 2024-07-18
Payer: COMMERCIAL

## 2024-07-18 ENCOUNTER — APPOINTMENT (EMERGENCY)
Dept: RADIOLOGY | Facility: HOSPITAL | Age: 54
End: 2024-07-18
Payer: COMMERCIAL

## 2024-07-18 ENCOUNTER — HOSPITAL ENCOUNTER (OUTPATIENT)
Facility: HOSPITAL | Age: 54
Setting detail: OBSERVATION
Discharge: HOME | End: 2024-07-19
Attending: EMERGENCY MEDICINE | Admitting: INTERNAL MEDICINE
Payer: COMMERCIAL
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
## 2024-07-18 DIAGNOSIS — M54.9 BACK PAIN: ICD-10-CM

## 2024-07-18 LAB
ALBUMIN SERPL BCG-MCNC: 3.3 G/DL (ref 3.5–5)
ALP SERPL-CCNC: 177 U/L (ref 34–104)
ALT SERPL W P-5'-P-CCNC: 28 U/L (ref 7–52)
AMMONIA PLAS-SCNC: 53 UMOL/L (ref 18–72)
ANION GAP SERPL CALCULATED.3IONS-SCNC: 7 MMOL/L (ref 4–13)
APTT PPP: 28 SECONDS (ref 23–37)
AST SERPL W P-5'-P-CCNC: 48 U/L (ref 13–39)
BACTERIA UR QL AUTO: NORMAL /HPF
BASOPHILS # BLD AUTO: 0.03 THOUSANDS/ÂΜL (ref 0–0.1)
BASOPHILS NFR BLD AUTO: 1 % (ref 0–1)
BILIRUB SERPL-MCNC: 1.55 MG/DL (ref 0.2–1)
BILIRUB UR QL STRIP: NEGATIVE
BUN SERPL-MCNC: 24 MG/DL (ref 5–25)
CALCIUM ALBUM COR SERPL-MCNC: 9.5 MG/DL (ref 8.3–10.1)
CALCIUM SERPL-MCNC: 8.9 MG/DL (ref 8.4–10.2)
CHLORIDE SERPL-SCNC: 104 MMOL/L (ref 96–108)
CLARITY UR: ABNORMAL
CO2 SERPL-SCNC: 27 MMOL/L (ref 21–32)
COLOR UR: YELLOW
CREAT SERPL-MCNC: 1.3 MG/DL (ref 0.6–1.3)
EOSINOPHIL # BLD AUTO: 0.03 THOUSAND/ÂΜL (ref 0–0.61)
EOSINOPHIL NFR BLD AUTO: 1 % (ref 0–6)
ERYTHROCYTE [DISTWIDTH] IN BLOOD BY AUTOMATED COUNT: 13.7 % (ref 11.6–15.1)
GFR SERPL CREATININE-BSD FRML MDRD: 46 ML/MIN/1.73SQ M
GLUCOSE SERPL-MCNC: 104 MG/DL (ref 65–140)
GLUCOSE UR STRIP-MCNC: NEGATIVE MG/DL
HCT VFR BLD AUTO: 34.5 % (ref 34.8–46.1)
HGB BLD-MCNC: 11.5 G/DL (ref 11.5–15.4)
HGB UR QL STRIP.AUTO: ABNORMAL
IMM GRANULOCYTES # BLD AUTO: 0.01 THOUSAND/UL (ref 0–0.2)
IMM GRANULOCYTES NFR BLD AUTO: 0 % (ref 0–2)
INR PPP: 1.16 (ref 0.84–1.19)
KETONES UR STRIP-MCNC: NEGATIVE MG/DL
LEUKOCYTE ESTERASE UR QL STRIP: NEGATIVE
LYMPHOCYTES # BLD AUTO: 0.49 THOUSANDS/ÂΜL (ref 0.6–4.47)
LYMPHOCYTES NFR BLD AUTO: 20 % (ref 14–44)
MCH RBC QN AUTO: 32.3 PG (ref 26.8–34.3)
MCHC RBC AUTO-ENTMCNC: 33.3 G/DL (ref 31.4–37.4)
MCV RBC AUTO: 97 FL (ref 82–98)
MONOCYTES # BLD AUTO: 0.19 THOUSAND/ÂΜL (ref 0.17–1.22)
MONOCYTES NFR BLD AUTO: 8 % (ref 4–12)
NEUTROPHILS # BLD AUTO: 1.7 THOUSANDS/ÂΜL (ref 1.85–7.62)
NEUTS SEG NFR BLD AUTO: 70 % (ref 43–75)
NITRITE UR QL STRIP: NEGATIVE
NON-SQ EPI CELLS URNS QL MICRO: NORMAL /HPF
NRBC BLD AUTO-RTO: 0 /100 WBCS
PH UR STRIP.AUTO: 7 [PH]
PLATELET # BLD AUTO: 84 THOUSANDS/UL (ref 149–390)
PMV BLD AUTO: 9.8 FL (ref 8.9–12.7)
POTASSIUM SERPL-SCNC: 4 MMOL/L (ref 3.5–5.3)
PROT SERPL-MCNC: 8 G/DL (ref 6.4–8.4)
PROT UR STRIP-MCNC: NEGATIVE MG/DL
PROTHROMBIN TIME: 15.2 SECONDS (ref 11.6–14.5)
RBC # BLD AUTO: 3.56 MILLION/UL (ref 3.81–5.12)
RBC #/AREA URNS AUTO: NORMAL /HPF
SODIUM SERPL-SCNC: 138 MMOL/L (ref 135–147)
SP GR UR STRIP.AUTO: 1.01 (ref 1–1.03)
UROBILINOGEN UR QL STRIP.AUTO: 1 E.U./DL
WBC # BLD AUTO: 2.45 THOUSAND/UL (ref 4.31–10.16)
WBC #/AREA URNS AUTO: NORMAL /HPF

## 2024-07-18 PROCEDURE — 71045 X-RAY EXAM CHEST 1 VIEW: CPT

## 2024-07-18 PROCEDURE — 80053 COMPREHEN METABOLIC PANEL: CPT | Performed by: PHYSICIAN ASSISTANT

## 2024-07-18 PROCEDURE — 99285 EMERGENCY DEPT VISIT HI MDM: CPT | Performed by: PHYSICIAN ASSISTANT

## 2024-07-18 PROCEDURE — 72125 CT NECK SPINE W/O DYE: CPT

## 2024-07-18 PROCEDURE — 99223 1ST HOSP IP/OBS HIGH 75: CPT | Performed by: INTERNAL MEDICINE

## 2024-07-18 PROCEDURE — 85610 PROTHROMBIN TIME: CPT | Performed by: PHYSICIAN ASSISTANT

## 2024-07-18 PROCEDURE — 82140 ASSAY OF AMMONIA: CPT | Performed by: INTERNAL MEDICINE

## 2024-07-18 PROCEDURE — 72170 X-RAY EXAM OF PELVIS: CPT

## 2024-07-18 PROCEDURE — 85025 COMPLETE CBC W/AUTO DIFF WBC: CPT | Performed by: PHYSICIAN ASSISTANT

## 2024-07-18 PROCEDURE — 71260 CT THORAX DX C+: CPT

## 2024-07-18 PROCEDURE — 74177 CT ABD & PELVIS W/CONTRAST: CPT

## 2024-07-18 PROCEDURE — 85730 THROMBOPLASTIN TIME PARTIAL: CPT | Performed by: PHYSICIAN ASSISTANT

## 2024-07-18 PROCEDURE — 36415 COLL VENOUS BLD VENIPUNCTURE: CPT | Performed by: PHYSICIAN ASSISTANT

## 2024-07-18 PROCEDURE — 81001 URINALYSIS AUTO W/SCOPE: CPT | Performed by: PHYSICIAN ASSISTANT

## 2024-07-18 PROCEDURE — 96375 TX/PRO/DX INJ NEW DRUG ADDON: CPT

## 2024-07-18 PROCEDURE — 70450 CT HEAD/BRAIN W/O DYE: CPT

## 2024-07-18 PROCEDURE — 96374 THER/PROPH/DIAG INJ IV PUSH: CPT

## 2024-07-18 PROCEDURE — 99284 EMERGENCY DEPT VISIT MOD MDM: CPT

## 2024-07-18 RX ORDER — AMITRIPTYLINE HYDROCHLORIDE 25 MG/1
75 TABLET, FILM COATED ORAL
Status: DISCONTINUED | OUTPATIENT
Start: 2024-07-18 | End: 2024-07-19 | Stop reason: HOSPADM

## 2024-07-18 RX ORDER — TORSEMIDE 20 MG/1
20 TABLET ORAL
Status: DISCONTINUED | OUTPATIENT
Start: 2024-07-18 | End: 2024-07-19 | Stop reason: HOSPADM

## 2024-07-18 RX ORDER — LIDOCAINE 50 MG/G
1 PATCH TOPICAL DAILY
Status: DISCONTINUED | OUTPATIENT
Start: 2024-07-18 | End: 2024-07-19 | Stop reason: HOSPADM

## 2024-07-18 RX ORDER — HYDROXYCHLOROQUINE SULFATE 200 MG/1
200 TABLET, FILM COATED ORAL 2 TIMES DAILY WITH MEALS
Status: DISCONTINUED | OUTPATIENT
Start: 2024-07-18 | End: 2024-07-19 | Stop reason: HOSPADM

## 2024-07-18 RX ORDER — OXYCODONE HYDROCHLORIDE 5 MG/1
5 TABLET ORAL EVERY 4 HOURS PRN
Status: DISCONTINUED | OUTPATIENT
Start: 2024-07-18 | End: 2024-07-19 | Stop reason: HOSPADM

## 2024-07-18 RX ORDER — FOLIC ACID 1 MG/1
1 TABLET ORAL DAILY
Status: DISCONTINUED | OUTPATIENT
Start: 2024-07-18 | End: 2024-07-19 | Stop reason: HOSPADM

## 2024-07-18 RX ORDER — HYDROMORPHONE HCL/PF 1 MG/ML
0.5 SYRINGE (ML) INJECTION ONCE
Status: COMPLETED | OUTPATIENT
Start: 2024-07-18 | End: 2024-07-18

## 2024-07-18 RX ORDER — ACETAMINOPHEN 325 MG/1
975 TABLET ORAL EVERY 8 HOURS SCHEDULED
Status: DISCONTINUED | OUTPATIENT
Start: 2024-07-18 | End: 2024-07-18

## 2024-07-18 RX ORDER — HYDROMORPHONE HCL/PF 1 MG/ML
0.5 SYRINGE (ML) INJECTION EVERY 4 HOURS PRN
Status: DISCONTINUED | OUTPATIENT
Start: 2024-07-18 | End: 2024-07-19 | Stop reason: HOSPADM

## 2024-07-18 RX ORDER — SPIRONOLACTONE 25 MG/1
50 TABLET ORAL DAILY
Status: DISCONTINUED | OUTPATIENT
Start: 2024-07-18 | End: 2024-07-19 | Stop reason: HOSPADM

## 2024-07-18 RX ORDER — ENOXAPARIN SODIUM 100 MG/ML
40 INJECTION SUBCUTANEOUS DAILY
Status: DISCONTINUED | OUTPATIENT
Start: 2024-07-18 | End: 2024-07-19 | Stop reason: HOSPADM

## 2024-07-18 RX ORDER — GABAPENTIN 100 MG/1
100 CAPSULE ORAL 2 TIMES DAILY
Status: DISCONTINUED | OUTPATIENT
Start: 2024-07-18 | End: 2024-07-19 | Stop reason: HOSPADM

## 2024-07-18 RX ORDER — ONDANSETRON 2 MG/ML
4 INJECTION INTRAMUSCULAR; INTRAVENOUS EVERY 6 HOURS PRN
Status: DISCONTINUED | OUTPATIENT
Start: 2024-07-18 | End: 2024-07-19 | Stop reason: HOSPADM

## 2024-07-18 RX ORDER — FENTANYL CITRATE 50 UG/ML
50 INJECTION, SOLUTION INTRAMUSCULAR; INTRAVENOUS ONCE
Status: COMPLETED | OUTPATIENT
Start: 2024-07-18 | End: 2024-07-18

## 2024-07-18 RX ORDER — SERTRALINE HYDROCHLORIDE 100 MG/1
100 TABLET, FILM COATED ORAL DAILY
Status: DISCONTINUED | OUTPATIENT
Start: 2024-07-18 | End: 2024-07-19 | Stop reason: HOSPADM

## 2024-07-18 RX ORDER — DAPSONE 25 MG/1
50 TABLET ORAL DAILY
Status: DISCONTINUED | OUTPATIENT
Start: 2024-07-18 | End: 2024-07-19 | Stop reason: HOSPADM

## 2024-07-18 RX ORDER — LACTULOSE 10 G/15ML
20 SOLUTION ORAL 4 TIMES DAILY
Status: DISCONTINUED | OUTPATIENT
Start: 2024-07-18 | End: 2024-07-19 | Stop reason: HOSPADM

## 2024-07-18 RX ADMIN — AMITRIPTYLINE HYDROCHLORIDE 75 MG: 25 TABLET, FILM COATED ORAL at 22:02

## 2024-07-18 RX ADMIN — HYDROMORPHONE HYDROCHLORIDE 0.5 MG: 1 INJECTION, SOLUTION INTRAMUSCULAR; INTRAVENOUS; SUBCUTANEOUS at 17:11

## 2024-07-18 RX ADMIN — ENOXAPARIN SODIUM 40 MG: 40 INJECTION SUBCUTANEOUS at 17:35

## 2024-07-18 RX ADMIN — HYDROMORPHONE HYDROCHLORIDE 0.5 MG: 1 INJECTION, SOLUTION INTRAMUSCULAR; INTRAVENOUS; SUBCUTANEOUS at 12:57

## 2024-07-18 RX ADMIN — HYDROXYCHLOROQUINE SULFATE 200 MG: 200 TABLET, FILM COATED ORAL at 17:35

## 2024-07-18 RX ADMIN — SPIRONOLACTONE 50 MG: 25 TABLET ORAL at 17:34

## 2024-07-18 RX ADMIN — HYDROMORPHONE HYDROCHLORIDE 0.5 MG: 1 INJECTION, SOLUTION INTRAMUSCULAR; INTRAVENOUS; SUBCUTANEOUS at 22:03

## 2024-07-18 RX ADMIN — DAPSONE 50 MG: 25 TABLET ORAL at 17:33

## 2024-07-18 RX ADMIN — FENTANYL CITRATE 50 MCG: 50 INJECTION INTRAMUSCULAR; INTRAVENOUS at 12:10

## 2024-07-18 RX ADMIN — LIDOCAINE 1 PATCH: 50 PATCH CUTANEOUS at 17:34

## 2024-07-18 RX ADMIN — IOHEXOL 100 ML: 350 INJECTION, SOLUTION INTRAVENOUS at 11:55

## 2024-07-18 RX ADMIN — RIFAXIMIN 550 MG: 550 TABLET ORAL at 21:50

## 2024-07-18 RX ADMIN — LACTULOSE 20 G: 20 SOLUTION ORAL at 22:04

## 2024-07-18 RX ADMIN — ONDANSETRON 4 MG: 2 INJECTION INTRAMUSCULAR; INTRAVENOUS at 17:33

## 2024-07-18 RX ADMIN — SERTRALINE 100 MG: 100 TABLET, FILM COATED ORAL at 17:36

## 2024-07-18 RX ADMIN — TORSEMIDE 20 MG: 20 TABLET ORAL at 17:36

## 2024-07-18 RX ADMIN — HYDROMORPHONE HYDROCHLORIDE 0.5 MG: 1 INJECTION, SOLUTION INTRAMUSCULAR; INTRAVENOUS; SUBCUTANEOUS at 13:58

## 2024-07-18 RX ADMIN — GABAPENTIN 100 MG: 100 CAPSULE ORAL at 17:34

## 2024-07-18 RX ADMIN — FOLIC ACID 1 MG: 1 TABLET ORAL at 17:35

## 2024-07-18 RX ADMIN — LACTULOSE 20 G: 20 SOLUTION ORAL at 17:33

## 2024-07-18 NOTE — ASSESSMENT & PLAN NOTE
Status post mechanical fall at home  Brunt of the fall on the sacral area  Traumatic workup in the emergency room unrevealing however with intractable pain and unable to ambulate    Continue with multimodal pain management approach  Unable to utilize Tylenol due to cirrhosis  Lidocaine patch, narcotics as needed, ice  PT OT evaluation

## 2024-07-18 NOTE — H&P
[facility]  H&P  Name: Bailey Olsen 53 y.o. female I MRN: [id number]  Unit/Bed#: -01 I Date of Admission: 7/18/2024   Date of Service: 7/18/2024 I Hospital Day: 0      Assessment & Plan   * Intractable back pain  Assessment & Plan  Status post mechanical fall at home  Brunt of the fall on the sacral area  Traumatic workup in the emergency room unrevealing however with intractable pain and unable to ambulate    Continue with multimodal pain management approach  Unable to utilize Tylenol due to cirrhosis  Lidocaine patch, narcotics as needed, ice  PT OT evaluation    Hepatic encephalopathy (HCC)  Assessment & Plan  History of hepatic encephalopathy on rifaximin and lactulose at home  No bowel movement for couple days at home despite taking lactulose as prescribed  Will check ammonia level  For now continue with home lactulose and rifaximin dosing    Sarcoidosis  Assessment & Plan  Known history; follows with outside facility  Continue home dapsone    Other cirrhosis of liver (HCC)  Assessment & Plan  Appears compensated  Secondary to sarcoidosis?  Continue with home lactulose, rifaximin, diuretics      VTE Pharmacologic Prophylaxis:   Moderate Risk (Score 3-4) - Pharmacological DVT Prophylaxis Ordered: enoxaparin (Lovenox).  Code Status: Level 1 - Full Code   Discussion with family: Updated  () at bedside.    Anticipated Length of Stay: Patient will be admitted on an observation basis with an anticipated length of stay of less than 2 midnights secondary to intractable low back pain, pain management, clinical observation, PT OT evaluation.    Total Time Spent on Date of Encounter in care of patient: 55 mins. This time was spent on one or more of the following: performing physical exam; counseling and coordination of care; obtaining or reviewing history; documenting in the medical record; reviewing/ordering tests, medications or procedures; communicating with  other healthcare professionals and discussing with patient's family/caregivers.    Chief Complaint: fall and back pain    History of Present Illness:  Bailey Olsen is a 53 y.o. female with a PMH of psoriasis, sarcoidosis, carcinoid, hepatic encephalopathy, chronic diastolic CHF, who presents with back pain after a fall at home.  Patient states she was in her house doing normal activities when she lost her balance and fell backwards onto her sacrum.  She had excruciating pain and was brought to the emergency room.  Trauma workup in the emergency room was unrevealing.  However she has intractable pain in her low back including the sacral area.  Given the fact that she was unable to ambulate in the emergency room she was admitted to the hospital service for observation.  At the time of my evaluation she is resting comfortably in bed.  Her pain is tolerable without any movement.  Otherwise she was pleasant and conversive.   at bedside.  All questions and concerns addressed.    REVIEW OF SYSTEMS  General Denies fevers or chills. Denies generalized weakness or fatigue.    HEENT Denies hearing or vision changes.   Cardiovascular Denies chest pain. Denies LE swelling. Denies palpitations. Denies dyspnea on exertion.   Respiratory Denies cough. Denies difficulty breathing. Denies shortness of breath.   Genitourinary Denies hematuria. Denies dysuria. Denies difficulty voiding.Denies incontinence.   Gastrointestinal Denies nausea, vomiting, or diarrhea. Denies hematochezia, melena, or hematemesis.    Musculoskeletal Denies arthralgias or myalgias. Denies joint swelling.   Psychiatric  Denies changes in mood. Denies anxiety or depression.   Neurologic Denies headache. Denies lightheadedness/dizziness.Denies numbness/tingling. Denies weakness.   Endocrine Denies weight loss or weight gain. Denies excessive thirst, sweating, urination.         Past Medical and Surgical History:   Past Medical History:   Diagnosis Date     Carcinoid tumor     neuroendocrine    CHF (congestive heart failure) (HCC)     Cirrhosis of liver (HCC)        History reviewed. No pertinent surgical history.    Meds/Allergies:  Prior to Admission medications    Medication Sig Start Date End Date Taking? Authorizing Provider   amitriptyline (ELAVIL) 75 mg tablet Take 1 tablet (75 mg total) by mouth daily at bedtime 7/8/24  Yes Mar Oliva MD   Cholecalciferol (Vitamin D-3) 125 MCG (5000 UT) TABS Take 1 tablet by mouth once a week Tuesday   Yes Historical Provider, MD   dapsone 25 mg tablet Take 2 tablets (50 mg total) by mouth daily 7/8/24 8/7/24 Yes Mar Oliva MD   folic acid (FOLVITE) 1 mg tablet Take 1 mg by mouth daily 8/4/16 2/28/25 Yes Historical Provider, MD   gabapentin (NEURONTIN) 100 mg capsule Take 1 capsule (100 mg total) by mouth 2 (two) times a day 6/2/24 7/18/24 Yes Idalia Stuart MD   hydroxychloroquine (PLAQUENIL) 200 mg tablet Take 200 mg by mouth 2 (two) times a day with meals   Yes Historical Provider, MD   lactulose (CHRONULAC) 10 g/15 mL solution Take 30 mL (20 g total) by mouth 4 (four) times a day Goal bowel movements is 4 a day 6/18/24 7/18/24 Yes Kristyn Rodriguez PA-C   sertraline (ZOLOFT) 100 mg tablet Take 1 tablet (100 mg total) by mouth daily 7/8/24  Yes Mar Oliva MD   spironolactone (ALDACTONE) 50 mg tablet Take 1 tablet (50 mg total) by mouth daily 6/3/24 7/18/24 Yes Idalia Stuart MD   torsemide (DEMADEX) 20 mg tablet Take 1 tablet (20 mg total) by mouth 2 (two) times a day before meals 7/8/24 8/7/24 Yes Mar Oliva MD   hydrocortisone 1 % cream Apply topically 2 (two) times a day 6/2/24 7/2/24  Idalia Stuart MD   oxyCODONE (ROXICODONE) 5 immediate release tablet Take 1 tablet (5 mg total) by mouth every 6 (six) hours as needed for moderate pain for up to 10 days Max Daily Amount: 20 mg 7/8/24 7/18/24  Mar Oliva MD   polyethylene glycol (MIRALAX) 17 g packet Take 17 g by mouth if needed (constipation) for up  "to 10 days 7/8/24 7/18/24  Mar Oliva MD     I have reviewed home medications using recent Epic encounter.    Allergies: No Known Allergies    Social History:  Marital Status: /Civil Union   Occupation: does not work  Patient Pre-hospital Living Situation: Home  Patient Pre-hospital Level of Mobility: walks  Patient Pre-hospital Diet Restrictions: none  Substance Use History:   Social History     Substance and Sexual Activity   Alcohol Use Never     Social History     Tobacco Use   Smoking Status Never   Smokeless Tobacco Never     Social History     Substance and Sexual Activity   Drug Use Never       Family History:  Family History   Problem Relation Age of Onset    Hypertension Father        Physical Exam:     Vitals:   Blood Pressure: 127/65 (07/18/24 1511)  Pulse: 93 (07/18/24 1511)  Temperature: 97.9 °F (36.6 °C) (07/18/24 1511)  Temp Source: Temporal (07/18/24 1140)  Respirations: 16 (07/18/24 1511)  Height: 5' 5\" (165.1 cm) (07/18/24 1140)  Weight - Scale: 121 kg (266 lb) (07/18/24 1140)  SpO2: 94 % (07/18/24 1511)    PHYSICAL EXAM:    Vitals signs reviewed  Constitutional   Awake and cooperative. NAD.   Head/Neck   Normocephalic. Atraumatic.   HEENT   No scleral icterus. EOMI.   Heart   Regular rate and rhythm. No murmers.   Lungs   Clear to auscultation bilaterally. Respirations unlaboured.   Abdomen   Soft. Nontender. Nondistended.    Skin   Skin color normal. No rashes.   Extremities   No deformities. No peripheral edema.   Neuro   Alert and oriented. No new deficits.   Psych   Mood stable. Affect normal.         Additional Data:     Lab Results:  Results from last 7 days   Lab Units 07/18/24  1146   WBC Thousand/uL 2.45*   HEMOGLOBIN g/dL 11.5   HEMATOCRIT % 34.5*   PLATELETS Thousands/uL 84*   SEGS PCT % 70   LYMPHO PCT % 20   MONO PCT % 8   EOS PCT % 1     Results from last 7 days   Lab Units 07/18/24  1146   SODIUM mmol/L 138   POTASSIUM mmol/L 4.0   CHLORIDE mmol/L 104   CO2 mmol/L 27 "   BUN mg/dL 24   CREATININE mg/dL 1.30   ANION GAP mmol/L 7   CALCIUM mg/dL 8.9   ALBUMIN g/dL 3.3*   TOTAL BILIRUBIN mg/dL 1.55*   ALK PHOS U/L 177*   ALT U/L 28   AST U/L 48*   GLUCOSE RANDOM mg/dL 104     Results from last 7 days   Lab Units 07/18/24  1146   INR  1.16         Lab Results   Component Value Date    HGBA1C 5.9 (H) 02/27/2024    HGBA1C 5.9 08/03/2023           Lines/Drains:  Invasive Devices       Peripheral Intravenous Line  Duration             Peripheral IV 07/18/24 Left Antecubital <1 day                        Imaging: Reviewed radiology reports from this admission including: chest CT scan, abdominal/pelvic CT, and CT head  CT recon only thoracolumbar   Final Result by Raffaele Barry MD (07/18 1249)      No fracture or traumatic subluxation.               Workstation performed: [id number]         TRAUMA - CT head wo contrast   Final Result by Raffaele Barry MD (07/18 1213)      No acute intracranial abnormality.      No cervical spine fracture or traumatic malalignment.         The study was marked in EPIC for immediate notification.      Workstation performed: [id number]         TRAUMA - CT spine cervical wo contrast   Final Result by Raffaele Barry MD (07/18 1213)      No acute intracranial abnormality.      No cervical spine fracture or traumatic malalignment.         The study was marked in EPIC for immediate notification.      Workstation performed: [id number]         TRAUMA - CT chest abdomen pelvis w contrast   Final Result by Raffaele Barry MD (07/18 1224)   Addendum (preliminary) 1 of 1 by Raffaele Barry MD (07/18 1224)   ADDENDUM:      The study was marked in EPIC for immediate notification.      Final      No acute findings in the chest, abdomen, or pelvis.      Cirrhosis and sequela of portal hypertension.                  Workstation performed: [id number]         XR Trauma chest portable   Final Result by Katie Otero,  MD (07/18 120)      No acute cardiopulmonary disease.      No acute displaced fractures. See subsequent chest CT.         Workstation performed: [id number]         XR Trauma pelvis ap only 1 or 2 vw   Final Result by Raffaele Barry MD (07/18 9631)      No acute osseous abnormality.         Computerized Assisted Algorithm (CAA) may have been used to analyze all applicable images.         Workstation performed: [id number]             EKG and Other Studies Reviewed on Admission:   EKG: Personally Reviewed.    ** Please Note: This note has been constructed using a voice recognition system. **

## 2024-07-18 NOTE — ASSESSMENT & PLAN NOTE
History of hepatic encephalopathy on rifaximin and lactulose at home  No bowel movement for couple days at home despite taking lactulose as prescribed  Will check ammonia level  For now continue with home lactulose and rifaximin dosing

## 2024-07-18 NOTE — ASSESSMENT & PLAN NOTE
Appears compensated  Secondary to sarcoidosis?  Continue with home lactulose, rifaximin, diuretics

## 2024-07-18 NOTE — PLAN OF CARE
Problem: PAIN - ADULT  Goal: Verbalizes/displays adequate comfort level or baseline comfort level  Description: Interventions:  - Encourage patient to monitor pain and request assistance  - Assess pain using appropriate pain scale  - Administer analgesics based on type and severity of pain and evaluate response  - Implement non-pharmacological measures as appropriate and evaluate response  - Consider cultural and social influences on pain and pain management  - Notify physician/advanced practitioner if interventions unsuccessful or patient reports new pain  Outcome: Progressing     Problem: INFECTION - ADULT  Goal: Absence or prevention of progression during hospitalization  Description: INTERVENTIONS:  - Assess and monitor for signs and symptoms of infection  - Monitor lab/diagnostic results  - Monitor all insertion sites, i.e. indwelling lines, tubes, and drains  - Monitor endotracheal if appropriate and nasal secretions for changes in amount and color  - [place] appropriate cooling/warming therapies per order  - Administer medications as ordered  - Instruct and encourage patient and family to use good hand hygiene technique  - Identify and instruct in appropriate isolation precautions for identified infection/condition  Outcome: Progressing  Goal: Absence of fever/infection during neutropenic period  Description: INTERVENTIONS:  - Monitor WBC    Outcome: Progressing     Problem: SAFETY ADULT  Goal: Patient will remain free of falls  Description: INTERVENTIONS:  - Educate patient/family on patient safety including physical limitations  - Instruct patient to call for assistance with activity   - Consult OT/PT to assist with strengthening/mobility   - Keep Call bell within reach  - Keep bed low and locked with side rails adjusted as appropriate  - Keep care items and personal belongings within reach  - Initiate and maintain comfort rounds  - Make Fall Risk Sign visible to staff  - Offer Toileting every ***  Hours, in advance of need  - Initiate/Maintain ***alarm  - Obtain necessary fall risk management equipment: ***  - Apply yellow socks and bracelet for high fall risk patients  - Consider moving patient to room near nurses station  Outcome: Progressing  Goal: Maintain or return to baseline ADL function  Description: INTERVENTIONS:  -  Assess patient's ability to carry out ADLs; assess patient's baseline for ADL function and identify physical deficits which impact ability to perform ADLs (bathing, care of mouth/teeth, toileting, grooming, dressing, etc.)  - Assess/evaluate cause of self-care deficits   - Assess range of motion  - Assess patient's mobility; develop plan if impaired  - Assess patient's need for assistive devices and provide as appropriate  - Encourage maximum independence but intervene and supervise when necessary  - Involve family in performance of ADLs  - Assess for home care needs following discharge   - Consider OT consult to assist with ADL evaluation and planning for discharge  - Provide patient education as appropriate  Outcome: Progressing  Goal: Maintains/Returns to pre admission functional level  Description: INTERVENTIONS:  - Perform AM-PAC 6 Click Basic Mobility/ Daily Activity assessment daily.  - Set and communicate daily mobility goal to care team and patient/family/caregiver.   - Collaborate with rehabilitation services on mobility goals if consulted  - Perform Range of Motion *** times a day.  - Reposition patient every *** hours.  - Dangle patient *** times a day  - Stand patient *** times a day  - Ambulate patient *** times a day  - Out of bed to chair *** times a day   - Out of bed for meals *** times a day  - Out of bed for toileting  - Record patient progress and toleration of activity level   Outcome: Progressing     Problem: DISCHARGE PLANNING  Goal: Discharge to home or other facility with appropriate resources  Description: INTERVENTIONS:  - Identify barriers to discharge  w/patient and caregiver  - Arrange for needed discharge resources and transportation as appropriate  - Identify discharge learning needs (meds, wound care, etc.)  - Arrange for interpretive services to assist at discharge as needed  - Refer to Case Management Department for coordinating discharge planning if the patient needs post-hospital services based on physician/advanced practitioner order or complex needs related to functional status, cognitive ability, or social support system  Outcome: Progressing     Problem: Knowledge Deficit  Goal: Patient/family/caregiver demonstrates understanding of disease process, treatment plan, medications, and discharge instructions  Description: Complete learning assessment and assess knowledge base.  Interventions:  - Provide teaching at level of understanding  - Provide teaching via preferred learning methods  Outcome: Progressing     Problem: PAIN - ADULT  Goal: Verbalizes/displays adequate comfort level or baseline comfort level  Description: Interventions:  - Encourage patient to monitor pain and request assistance  - Assess pain using appropriate pain scale  - Administer analgesics based on type and severity of pain and evaluate response  - Implement non-pharmacological measures as appropriate and evaluate response  - Consider cultural and social influences on pain and pain management  - Notify physician/advanced practitioner if interventions unsuccessful or patient reports new pain  Outcome: Progressing     Problem: INFECTION - ADULT  Goal: Absence or prevention of progression during hospitalization  Description: INTERVENTIONS:  - Assess and monitor for signs and symptoms of infection  - Monitor lab/diagnostic results  - Monitor all insertion sites, i.e. indwelling lines, tubes, and drains  - Monitor endotracheal if appropriate and nasal secretions for changes in amount and color  - [place] appropriate cooling/warming therapies per order  - Administer medications as  ordered  - Instruct and encourage patient and family to use good hand hygiene technique  - Identify and instruct in appropriate isolation precautions for identified infection/condition  Outcome: Progressing

## 2024-07-18 NOTE — ED PROVIDER NOTES
Emergency Department Trauma Note  Bailey Olsen 53 y.o. female MRN: [id number]  Unit/Bed#: /-01 Encounter: [id number]      Trauma Alert: Trauma Acuity: Trauma Evaluation  Model of Arrival:   via    Trauma Team: Current Providers  Attending Provider: Alli Monroy MD  Attending Provider: Shahab Stearns DO  Physician Assistant: Ashanti Dominique PA-C  Registered Nurse: Holly De Anda RN  Patient Care Assistant: Laura Kirkland  Patient Care Assistant: Laura Kirkland  Registered Nurse: Hector Godinez RN  Patient Care Assistant: Jagruti Wright  Consultants:     None      History of Present Illness     Chief Complaint:   Chief Complaint   Patient presents with    Fall     Fall this morning and having back and neck pain     HPI:  Bailey Olsen is a 53 y.o. female who presents with pain s/p fall.  Mechanism:           53-year-old female presents the emergency department for evaluation of pain status post fall.  Patient states she was getting ready to go out to lunch with her brother when she tripped and fell.  Patient states she did hit a dresser.  She denies any loss of consciousness however reports she did hit her head.  States she has neck and back pain.  C-collar present.  Reports she is on Lovenox injections.  Abdomen is bruised which she reports is from the injections.  However she was tender in the abdomen.  She has normal range of motion's of her upper extremities without tenderness or complaints however had significant difficulty moving in the bed or sitting up.  Due to the back pain.  Patient denies any headaches visual changes or confusion.      Review of Systems   Constitutional: Negative.    Respiratory: Negative.     Cardiovascular: Negative.    Gastrointestinal:  Positive for abdominal pain. Negative for diarrhea, nausea and vomiting.   Genitourinary: Negative.    Musculoskeletal:  Positive for back pain and neck pain.   Skin:  Positive for color change.   Neurological:  Negative.    All other systems reviewed and are negative.      Historical Information     Immunizations:   Immunization History   Administered Date(s) Administered    COVID-19 MODERNA VACC 0.5 ML IM 09/24/2021       Past Medical History:   Diagnosis Date    Carcinoid tumor     neuroendocrine    CHF (congestive heart failure) (HCC)     Cirrhosis of liver (HCC)        Family History   Problem Relation Age of Onset    Hypertension Father      History reviewed. No pertinent surgical history.  Social History     Tobacco Use    Smoking status: Never    Smokeless tobacco: Never   Vaping Use    Vaping status: Never Used   Substance Use Topics    Alcohol use: Never    Drug use: Never     E-Cigarette/Vaping    E-Cigarette Use Never User      E-Cigarette/Vaping Substances       Family History: non-contributory    Meds/Allergies   Prior to Admission Medications   Prescriptions Last Dose Informant Patient Reported? Taking?   Cholecalciferol (Vitamin D-3) 125 MCG (5000 UT) TABS Past Week  Yes Yes   Sig: Take 1 tablet by mouth once a week Tuesday   amitriptyline (ELAVIL) 75 mg tablet 7/17/2024  No Yes   Sig: Take 1 tablet (75 mg total) by mouth daily at bedtime   dapsone 25 mg tablet 7/17/2024  No Yes   Sig: Take 2 tablets (50 mg total) by mouth daily   folic acid (FOLVITE) 1 mg tablet 7/17/2024  Yes Yes   Sig: Take 1 mg by mouth daily   gabapentin (NEURONTIN) 100 mg capsule 7/17/2024  No Yes   Sig: Take 1 capsule (100 mg total) by mouth 2 (two) times a day   hydrocortisone 1 % cream   No No   Sig: Apply topically 2 (two) times a day   hydroxychloroquine (PLAQUENIL) 200 mg tablet 7/17/2024  Yes Yes   Sig: Take 200 mg by mouth 2 (two) times a day with meals   lactulose (CHRONULAC) 10 g/15 mL solution 7/17/2024  No Yes   Sig: Take 30 mL (20 g total) by mouth 4 (four) times a day Goal bowel movements is 4 a day   oxyCODONE (ROXICODONE) 5 immediate release tablet   No No   Sig: Take 1 tablet (5 mg total) by mouth every 6 (six) hours  as needed for moderate pain for up to 10 days Max Daily Amount: 20 mg   polyethylene glycol (MIRALAX) 17 g packet   No No   Sig: Take 17 g by mouth if needed (constipation) for up to 10 days   sertraline (ZOLOFT) 100 mg tablet 7/17/2024  No Yes   Sig: Take 1 tablet (100 mg total) by mouth daily   spironolactone (ALDACTONE) 50 mg tablet 7/17/2024  No Yes   Sig: Take 1 tablet (50 mg total) by mouth daily   torsemide (DEMADEX) 20 mg tablet 7/17/2024  No Yes   Sig: Take 1 tablet (20 mg total) by mouth 2 (two) times a day before meals      Facility-Administered Medications: None       No Known Allergies    PHYSICAL EXAM    PE limited by: none    Objective   Vitals:   First set: Temperature: (!) 97.4 °F (36.3 °C) (07/18/24 1140)  Pulse: 92 (07/18/24 1140)  Respirations: 16 (07/18/24 1140)  Blood Pressure: 139/74 (07/18/24 1140)  SpO2: 98 % (07/18/24 1140)    Primary Survey:   (A) Airway: Intact  (B) Breathing: Spontaneously  (C) Circulation: Pulses:   normal  (D) Disabliity:  GCS Total:  15  (E) Expose:  Completed    Secondary Survey: (Click on Physical Exam tab above)  Physical Exam  Vitals and nursing note reviewed.   Constitutional:       General: She is not in acute distress.     Appearance: Normal appearance. She is obese. She is not ill-appearing, toxic-appearing or diaphoretic.   HENT:      Head: Normocephalic and atraumatic.      Nose: Nose normal.   Eyes:      Conjunctiva/sclera: Conjunctivae normal.   Neck:      Comments: C-collar in place.  Cardiovascular:      Rate and Rhythm: Normal rate and regular rhythm.   Pulmonary:      Effort: Pulmonary effort is normal.   Abdominal:      Palpations: Abdomen is soft.      Tenderness: There is abdominal tenderness.   Musculoskeletal:      Comments: Normal range of motion in the upper extremities.  Decreased range of motion of the lower extremities due to discomfort in the back.  Diffuse T/L-midline and paraspinal tenderness on exam with no overlying skin abnormalities.   No palpable deformities.   Skin:     General: Skin is warm and dry.      Findings: Bruising (Abdomen) present. No erythema or rash.   Neurological:      General: No focal deficit present.      Mental Status: She is alert and oriented to person, place, and time.   Psychiatric:         Mood and Affect: Mood normal.         Cervical spine cleared by clinical criteria? No (imaging required)      Invasive Devices       Peripheral Intravenous Line  Duration             Peripheral IV 07/18/24 Left Antecubital <1 day                    Lab Results:   Results Reviewed       Procedure Component Value Units Date/Time    Ammonia [[id number]]  (Normal) Collected: 07/18/24 1459    Lab Status: Final result Specimen: Blood from Arm, Left Updated: 07/18/24 1525     Ammonia 53 umol/L     Urine Microscopic [[id number]]  (Normal) Collected: 07/18/24 1302    Lab Status: Final result Specimen: Urine, Clean Catch Updated: 07/18/24 1327     RBC, UA 1-2 /hpf      WBC, UA 0-1 /hpf      Epithelial Cells Occasional /hpf      Bacteria, UA Occasional /hpf     UA w Reflex to Microscopic w Reflex to Culture [[id number]]  (Abnormal) Collected: 07/18/24 1302    Lab Status: Final result Specimen: Urine, Clean Catch Updated: 07/18/24 1308     Color, UA Yellow     Clarity, UA Slightly Cloudy     Specific Gravity, UA 1.010     pH, UA 7.0     Leukocytes, UA Negative     Nitrite, UA Negative     Protein, UA Negative mg/dl      Glucose, UA Negative mg/dl      Ketones, UA Negative mg/dl      Urobilinogen, UA 1.0 E.U./dl      Bilirubin, UA Negative     Occult Blood, UA Small    CBC and differential [[id number]]  (Abnormal) Collected: 07/18/24 1146    Lab Status: Final result Specimen: Blood from Arm, Left Updated: 07/18/24 1229     WBC 2.45 Thousand/uL      RBC 3.56 Million/uL      Hemoglobin 11.5 g/dL      Hematocrit 34.5 %      MCV 97 fL      MCH 32.3 pg      MCHC 33.3 g/dL      RDW 13.7 %      MPV 9.8 fL      Platelets 84 Thousands/uL      nRBC 0 /100 WBCs       Segmented % 70 %      Immature Grans % 0 %      Lymphocytes % 20 %      Monocytes % 8 %      Eosinophils Relative 1 %      Basophils Relative 1 %      Absolute Neutrophils 1.70 Thousands/µL      Absolute Immature Grans 0.01 Thousand/uL      Absolute Lymphocytes 0.49 Thousands/µL      Absolute Monocytes 0.19 Thousand/µL      Eosinophils Absolute 0.03 Thousand/µL      Basophils Absolute 0.03 Thousands/µL     Comprehensive metabolic panel [[id number]]  (Abnormal) Collected: 07/18/24 1146    Lab Status: Final result Specimen: Blood from Arm, Left Updated: 07/18/24 1211     Sodium 138 mmol/L      Potassium 4.0 mmol/L      Chloride 104 mmol/L      CO2 27 mmol/L      ANION GAP 7 mmol/L      BUN 24 mg/dL      Creatinine 1.30 mg/dL      Glucose 104 mg/dL      Calcium 8.9 mg/dL      Corrected Calcium 9.5 mg/dL      AST 48 U/L      ALT 28 U/L      Alkaline Phosphatase 177 U/L      Total Protein 8.0 g/dL      Albumin 3.3 g/dL      Total Bilirubin 1.55 mg/dL      eGFR 46 ml/min/1.73sq m     Narrative:      [employer] guidelines for Chronic Kidney Disease (CKD):     Stage 1 with normal or high GFR (GFR > 90 mL/min/1.73 square meters)    Stage 2 Mild CKD (GFR = 60-89 mL/min/1.73 square meters)    Stage 3A Moderate CKD (GFR = 45-59 mL/min/1.73 square meters)    Stage 3B Moderate CKD (GFR = 30-44 mL/min/1.73 square meters)    Stage 4 Severe CKD (GFR = 15-29 mL/min/1.73 square meters)    Stage 5 End Stage CKD (GFR <15 mL/min/1.73 square meters)  Note: GFR calculation is accurate only with a steady state creatinine    Protime-INR [[id number]]  (Abnormal) Collected: 07/18/24 1146    Lab Status: Final result Specimen: Blood from Arm, Left Updated: 07/18/24 1210     Protime 15.2 seconds      INR 1.16    APTT [[id number]]  (Normal) Collected: 07/18/24 1146    Lab Status: Final result Specimen: Blood from Arm, Left Updated: 07/18/24 1210     PTT 28 seconds                    Imaging Studies:   Direct to CT: No  CT  recon only thoracolumbar   Final Result by Raffaele Barry MD (07/18 1249)      No fracture or traumatic subluxation.               Workstation performed: [id number]         TRAUMA - CT head wo contrast   Final Result by Raffaele Barry MD (07/18 1213)      No acute intracranial abnormality.      No cervical spine fracture or traumatic malalignment.         The study was marked in EPIC for immediate notification.      Workstation performed: [id number]         TRAUMA - CT spine cervical wo contrast   Final Result by Raffaele Barry MD (07/18 1213)      No acute intracranial abnormality.      No cervical spine fracture or traumatic malalignment.         The study was marked in EPIC for immediate notification.      Workstation performed: [id number]         TRAUMA - CT chest abdomen pelvis w contrast   Final Result by Raffaele Barry MD (07/18 1224)   Addendum (preliminary) 1 of 1 by Raffaele Barry MD (07/18 1224)   ADDENDUM:      The study was marked in EPIC for immediate notification.      Final      No acute findings in the chest, abdomen, or pelvis.      Cirrhosis and sequela of portal hypertension.                  Workstation performed: [id number]         XR Trauma chest portable   Final Result by Katie Otero MD (07/18 1209)      No acute cardiopulmonary disease.      No acute displaced fractures. See subsequent chest CT.         Workstation performed: [id number]         XR Trauma pelvis ap only 1 or 2 vw   Final Result by Raffaele Barry MD (07/18 1223)      No acute osseous abnormality.         Computerized Assisted Algorithm (CAA) may have been used to analyze all applicable images.         Workstation performed: [id number]               Procedures  Procedures         ED Course  ED Course as of 07/18/24 1608   Thu Jul 18, 2024   1224 Chest xray: No acute cardiopulmonary disease.   1224 CT c spine: No acute intracranial abnormality.     No  cervical spine fracture or traumatic malalignment.       1224 Cervical Collar Clearance:    The patient had a CT scan of the cervical spine demonstrating no acute injury. On exam, the patient had no midline point tenderness or paresthesias/numbness/weakness in the extremities. The patient had full range of motion (was then able to flex, extend, and rotate head laterally) without pain. There were no distracting injuries and the patient was not intoxicated.      The patient's cervical spine was cleared radiologically and clinically. Cervical collar removed at this time.     Ashanti Dominique PA-C  7/18/2024 12:24 PM        1224 CT  head: No acute intracranial abnormality.     No cervical spine fracture or traumatic malalignment.        1224 CT chest abd pelvis: No acute findings in the chest, abdomen, or pelvis.     Cirrhosis and sequela of portal hypertension.     1226 Pelvic xray: No acute osseous abnormality.        1250 Patient reports no improvement of pain.  Reports 10 out of 10.  Will trial Dilaudid   1303 CT T&L recon: No fracture or traumatic subluxation.   1332 Patient going to attempt to ambulate    1347 Patient unable to ambulate due to pain   1351 Patient having significant difficulty even getting on bedpan.  Message sent to hospitalist requesting admission   1355 Patient accepted for admission           Medical Decision Making  53-year-old female presented to the emergency department for evaluation of pain status post fall.  Vitals and medical record reviewed.  Patient at risk for fall but limited to fracture, contusion, abdominal organ lacerations, pneumothorax, subdural hematoma, subarachnoid hemorrhage.  Her CT trauma imaging was unremarkable for acute traumatic injury.  Patient did have significant pain in the emergency department which was wanting to control causing inability to ambulate.  She was ultimately excepted to medicine service for continued pain treatment.    Amount and/or Complexity of Data  Reviewed  Labs: ordered.  Radiology: ordered.    Risk  Prescription drug management.  Decision regarding hospitalization.                Disposition  [facility] Transfer: No  Final diagnoses:   Fall, initial encounter   Back pain     Time reflects when diagnosis was documented in both MDM as applicable and the Disposition within this note       Time User Action Codes Description Comment    7/18/2024  1:56 PM Ashanti Dominique [W19.XXXA] Fall, initial encounter     7/18/2024  1:56 PM Ashanti Dominique Add [M54.9] Back pain           ED Disposition       ED Disposition   Admit    Condition   Stable    Date/Time   Thu Jul 18, 2024  1:56 PM    Comment   Case was discussed with Dr. Oliva and the patient's admission status was agreed to be Admission Status: observation status to the service of Dr. Kim .               Follow-up Information    None       Current Discharge Medication List        CONTINUE these medications which have NOT CHANGED    Details   amitriptyline (ELAVIL) 75 mg tablet Take 1 tablet (75 mg total) by mouth daily at bedtime  Qty: 30 tablet, Refills: 0    Associated Diagnoses: Acute on chronic diastolic heart failure (HCC)      Cholecalciferol (Vitamin D-3) 125 MCG (5000 UT) TABS Take 1 tablet by mouth once a week Tuesday      dapsone 25 mg tablet Take 2 tablets (50 mg total) by mouth daily  Qty: 60 tablet, Refills: 0    Associated Diagnoses: Sarcoidosis      folic acid (FOLVITE) 1 mg tablet Take 1 mg by mouth daily      gabapentin (NEURONTIN) 100 mg capsule Take 1 capsule (100 mg total) by mouth 2 (two) times a day  Qty: 60 capsule, Refills: 0    Associated Diagnoses: Sarcoidosis      hydroxychloroquine (PLAQUENIL) 200 mg tablet Take 200 mg by mouth 2 (two) times a day with meals      lactulose (CHRONULAC) 10 g/15 mL solution Take 30 mL (20 g total) by mouth 4 (four) times a day Goal bowel movements is 4 a day  Qty: 3600 mL, Refills: 0    Associated Diagnoses: Hepatic encephalopathy (HCC)       sertraline (ZOLOFT) 100 mg tablet Take 1 tablet (100 mg total) by mouth daily  Qty: 30 tablet, Refills: 0    Associated Diagnoses: Sarcoidosis      spironolactone (ALDACTONE) 50 mg tablet Take 1 tablet (50 mg total) by mouth daily  Qty: 30 tablet, Refills: 0    Associated Diagnoses: CHF (congestive heart failure) (HCC)      torsemide (DEMADEX) 20 mg tablet Take 1 tablet (20 mg total) by mouth 2 (two) times a day before meals  Qty: 60 tablet, Refills: 0    Associated Diagnoses: CHF (congestive heart failure) (HCC)      hydrocortisone 1 % cream Apply topically 2 (two) times a day  Qty: 28 g, Refills: 0    Associated Diagnoses: Sarcoidosis      oxyCODONE (ROXICODONE) 5 immediate release tablet Take 1 tablet (5 mg total) by mouth every 6 (six) hours as needed for moderate pain for up to 10 days Max Daily Amount: 20 mg    Associated Diagnoses: Acute on chronic diastolic heart failure (HCC)      polyethylene glycol (MIRALAX) 17 g packet Take 17 g by mouth if needed (constipation) for up to 10 days  Qty: 10 each, Refills: 0    Associated Diagnoses: Hepatic encephalopathy (HCC)           No discharge procedures on file.    PDMP Review         Value Time User    PDMP Reviewed  Yes 6/18/2024  2:50 PM Kristyn Rodriguez PA-C            ED Provider  Electronically Signed by           Ashanti Dominique PA-C  07/18/24 7536

## 2024-07-19 VITALS
HEIGHT: 65 IN | SYSTOLIC BLOOD PRESSURE: 127 MMHG | WEIGHT: 266 LBS | OXYGEN SATURATION: 97 % | DIASTOLIC BLOOD PRESSURE: 67 MMHG | RESPIRATION RATE: 18 BRPM | TEMPERATURE: 97.7 F | BODY MASS INDEX: 44.32 KG/M2 | HEART RATE: 89 BPM

## 2024-07-19 LAB
DME PARACHUTE DELIVERY DATE REQUESTED: NORMAL
DME PARACHUTE ITEM DESCRIPTION: NORMAL
DME PARACHUTE ORDER STATUS: NORMAL
DME PARACHUTE SUPPLIER NAME: NORMAL
DME PARACHUTE SUPPLIER PHONE: NORMAL

## 2024-07-19 PROCEDURE — 97167 OT EVAL HIGH COMPLEX 60 MIN: CPT

## 2024-07-19 PROCEDURE — 97163 PT EVAL HIGH COMPLEX 45 MIN: CPT

## 2024-07-19 PROCEDURE — 99239 HOSP IP/OBS DSCHRG MGMT >30: CPT | Performed by: INTERNAL MEDICINE

## 2024-07-19 RX ADMIN — HYDROXYCHLOROQUINE SULFATE 200 MG: 200 TABLET, FILM COATED ORAL at 08:33

## 2024-07-19 RX ADMIN — RIFAXIMIN 550 MG: 550 TABLET ORAL at 08:33

## 2024-07-19 RX ADMIN — HYDROMORPHONE HYDROCHLORIDE 0.5 MG: 1 INJECTION, SOLUTION INTRAMUSCULAR; INTRAVENOUS; SUBCUTANEOUS at 04:00

## 2024-07-19 RX ADMIN — LACTULOSE 20 G: 20 SOLUTION ORAL at 08:33

## 2024-07-19 RX ADMIN — HYDROMORPHONE HYDROCHLORIDE 0.5 MG: 1 INJECTION, SOLUTION INTRAMUSCULAR; INTRAVENOUS; SUBCUTANEOUS at 09:13

## 2024-07-19 RX ADMIN — DAPSONE 50 MG: 25 TABLET ORAL at 08:36

## 2024-07-19 RX ADMIN — OXYCODONE HYDROCHLORIDE 5 MG: 5 TABLET ORAL at 06:48

## 2024-07-19 RX ADMIN — ENOXAPARIN SODIUM 40 MG: 40 INJECTION SUBCUTANEOUS at 08:33

## 2024-07-19 RX ADMIN — GABAPENTIN 100 MG: 100 CAPSULE ORAL at 08:33

## 2024-07-19 RX ADMIN — OXYCODONE HYDROCHLORIDE 5 MG: 5 TABLET ORAL at 12:41

## 2024-07-19 RX ADMIN — SERTRALINE 100 MG: 100 TABLET, FILM COATED ORAL at 08:33

## 2024-07-19 RX ADMIN — SPIRONOLACTONE 50 MG: 25 TABLET ORAL at 08:33

## 2024-07-19 RX ADMIN — TORSEMIDE 20 MG: 20 TABLET ORAL at 08:33

## 2024-07-19 RX ADMIN — FOLIC ACID 1 MG: 1 TABLET ORAL at 08:33

## 2024-07-19 NOTE — PLAN OF CARE
Problem: PAIN - ADULT  Goal: Verbalizes/displays adequate comfort level or baseline comfort level  Description: Interventions:  - Encourage patient to monitor pain and request assistance  - Assess pain using appropriate pain scale  - Administer analgesics based on type and severity of pain and evaluate response  - Implement non-pharmacological measures as appropriate and evaluate response  - Consider cultural and social influences on pain and pain management  - Notify physician/advanced practitioner if interventions unsuccessful or patient reports new pain  Outcome: Progressing     Problem: INFECTION - ADULT  Goal: Absence or prevention of progression during hospitalization  Description: INTERVENTIONS:  - Assess and monitor for signs and symptoms of infection  - Monitor lab/diagnostic results  - Monitor all insertion sites, i.e. indwelling lines, tubes, and drains  - Monitor endotracheal if appropriate and nasal secretions for changes in amount and color  - [place] appropriate cooling/warming therapies per order  - Administer medications as ordered  - Instruct and encourage patient and family to use good hand hygiene technique  - Identify and instruct in appropriate isolation precautions for identified infection/condition  Outcome: Progressing  Goal: Absence of fever/infection during neutropenic period  Description: INTERVENTIONS:  - Monitor WBC    Outcome: Progressing     Problem: SAFETY ADULT  Goal: Patient will remain free of falls  Description: INTERVENTIONS:  - Educate patient/family on patient safety including physical limitations  - Instruct patient to call for assistance with activity   - Consult OT/PT to assist with strengthening/mobility   - Keep Call bell within reach  - Keep bed low and locked with side rails adjusted as appropriate  - Keep care items and personal belongings within reach  - Initiate and maintain comfort rounds  - Make Fall Risk Sign visible to staff  - Offer Toileting every  Hours,  in advance of need  - Initiate/Maintain alarm  - Obtain necessary fall risk management equipment:   - Apply yellow socks and bracelet for high fall risk patients  - Consider moving patient to room near nurses station  Outcome: Progressing  Goal: Maintain or return to baseline ADL function  Description: INTERVENTIONS:  -  Assess patient's ability to carry out ADLs; assess patient's baseline for ADL function and identify physical deficits which impact ability to perform ADLs (bathing, care of mouth/teeth, toileting, grooming, dressing, etc.)  - Assess/evaluate cause of self-care deficits   - Assess range of motion  - Assess patient's mobility; develop plan if impaired  - Assess patient's need for assistive devices and provide as appropriate  - Encourage maximum independence but intervene and supervise when necessary  - Involve family in performance of ADLs  - Assess for home care needs following discharge   - Consider OT consult to assist with ADL evaluation and planning for discharge  - Provide patient education as appropriate  Outcome: Progressing  Goal: Maintains/Returns to pre admission functional level  Description: INTERVENTIONS:  - Perform AM-PAC 6 Click Basic Mobility/ Daily Activity assessment daily.  - Set and communicate daily mobility goal to care team and patient/family/caregiver.   - Collaborate with rehabilitation services on mobility goals if consulted  - Perform Range of Motion  times a day.  - Reposition patient every  hours.  - Dangle patient  times a day  - Stand patient  times a day  - Ambulate patient  times a day  - Out of bed to chair  times a day   - Out of bed for meals  times a day  - Out of bed for toileting  - Record patient progress and toleration of activity level   Outcome: Progressing     Problem: DISCHARGE PLANNING  Goal: Discharge to home or other facility with appropriate resources  Description: INTERVENTIONS:  - Identify barriers to discharge w/patient and caregiver  - Arrange for  needed discharge resources and transportation as appropriate  - Identify discharge learning needs (meds, wound care, etc.)  - Arrange for interpretive services to assist at discharge as needed  - Refer to Case Management Department for coordinating discharge planning if the patient needs post-hospital services based on physician/advanced practitioner order or complex needs related to functional status, cognitive ability, or social support system  Outcome: Progressing     Problem: Knowledge Deficit  Goal: Patient/family/caregiver demonstrates understanding of disease process, treatment plan, medications, and discharge instructions  Description: Complete learning assessment and assess knowledge base.  Interventions:  - Provide teaching at level of understanding  - Provide teaching via preferred learning methods  Outcome: Progressing     Problem: Prexisting or High Potential for Compromised Skin Integrity  Goal: Skin integrity is maintained or improved  Description: INTERVENTIONS:  - Identify patients at risk for skin breakdown  - Assess and monitor skin integrity  - Assess and monitor nutrition and hydration status  - Monitor labs   - Assess for incontinence   - Turn and reposition patient  - Assist with mobility/ambulation  - Relieve pressure over bony prominences  - Avoid friction and shearing  - Provide appropriate hygiene as needed including keeping skin clean and dry  - Evaluate need for skin moisturizer/barrier cream  - Collaborate with interdisciplinary team   - Patient/family teaching  - Consider wound care consult   Outcome: Progressing

## 2024-07-19 NOTE — CASE MANAGEMENT
Case Management Assessment & Discharge Planning Note    Patient name Bailey Olsen  Location /-01 MRN [id number]  : 1970 Date 2024       Current Admission Date: 2024  Current Admission Diagnosis:Intractable back pain   Patient Active Problem List    Diagnosis Date Noted Date Diagnosed    Intractable back pain 2024     Hyperammonemia (HCC) 2024     Hepatic encephalopathy (HCC) 2024     Fall 2024     Other cirrhosis of liver (HCC) 2024     History of malignant carcinoid tumor of small intestine 2024     Class 3 severe obesity due to excess calories in adult (HCC) 2024     Primary hypertension 2024     Anxiety 2024     Hypokalemia 2024     Fibromyalgia 2024     Iron deficiency anemia 2024     Pancytopenia (HCC) 2024     Sarcoidosis 2024     Acute on chronic diastolic (congestive) heart failure (HCC) 2024     Bilateral pulmonary infiltrates 2024       LOS (days): 0  Geometric Mean LOS (GMLOS) (days):   Days to GMLOS:     OBJECTIVE:              Current admission status: Observation  Referral Reason: Other (Disposition Planning)    Preferred Pharmacy:   [facility] [address] [place] [address] [address] [facility]  [address] [facility] [address]  Phone: [phone] Fax: [phone]    Primary Care Provider: Nayely Brown DO    Primary Insurance: [facility]  Secondary Insurance:     ASSESSMENT:  Active Health Care Proxies    There are no active Health Care Proxies on file.       Advance Directives  Does patient have a Health Care POA?: No  Was patient offered paperwork?: Yes (CM provided)  Does patient currently have a Health Care decision maker?: Yes, please see Health Care Proxy section  Does patient have Advance Directives?: No  Was patient offered paperwork?: Yes (CM provided)  Primary Contact: Cristian, spouse         Readmission Root Cause  30 Day Readmission:  No    Patient Information  Admitted from:: Home  Mental Status: Alert  During Assessment patient was accompanied by: Not accompanied during assessment  Assessment information provided by:: Patient  Primary Caregiver: Self  Support Systems: Spouse/significant other, Daughter  County of Residence: [facility]  What city do you live in?: [place]  Home entry access options. Select all that apply.: Stairs  Number of steps to enter home.: 1  Do the steps have railings?: No  Type of Current Residence: [address] [place] home  Upon entering residence, is there a bedroom on the main floor (no further steps)?: Yes  Upon entering residence, is there a bathroom on the main floor (no further steps)?: Yes  Living Arrangements: Lives w/ Spouse/significant other  Is patient a ?: No    Activities of Daily Living Prior to Admission  Functional Status: Assistance  Completes ADLs independently?: No  Level of ADL dependence: Assistance  Ambulates independently?: Yes  Does patient use assisted devices?: Yes  Assisted Devices (DME) used: Walker  Does patient currently own DME?: Yes  What DME does the patient currently own?: Walker  Does patient have a history of Outpatient Therapy (PT/OT)?: Yes ([facility])  Does the patient have a history of Short-Term Rehab?: Yes ([facility] and [facility])  Does patient have a history of HHC?: Yes ([facility])  Does patient currently have HHC?: No         Patient Information Continued  Income Source: SSI/SSD  Does patient have prescription coverage?: Yes  Does patient receive dialysis treatments?: No  Does patient have a history of substance abuse?: No  Does patient have a history of Mental Health Diagnosis?: No         Means of Transportation  Means of Transport to \[facility]\"":: Family transport      Social Determinants of Health (SDOH)      Flowsheet Row Most Recent Value   Housing Stability    In the last 12 months, was there a time when you were not able to pay the mortgage or rent on time? N   In the  past 12 months, how many times have you moved where you were living? 0   At any time in the past 12 months, were you homeless or living in a shelter (including now)? N   Transportation Needs    In the past 12 months, has lack of transportation kept you from medical appointments or from getting medications? no   In the past 12 months, has lack of transportation kept you from meetings, work, or from getting things needed for daily living? No   Food Insecurity    Within the past 12 months, you worried that your food would run out before you got the money to buy more. Never true   Within the past 12 months, the food you bought just didn't last and you didn't have money to get more. Never true   Utilities    In the past 12 months has the electric, gas, oil, or water company threatened to shut off services in your home? No            DISCHARGE DETAILS:    Discharge planning discussed with:: patient  Freedom of Choice: Yes     CM contacted family/caregiver?: No- see comments (pt declined)  Were Treatment Team discharge recommendations reviewed with patient/caregiver?: Yes  Did patient/caregiver verbalize understanding of patient care needs?: Yes            Requested Home Health Care         Is the patient interested in HHC at discharge?: No    DME Referral Provided  Referral made for DME?: Yes  DME referral completed for the following items:: [facility]side [employer]  DME Supplier Name:: [employer]    Other Referral/Resources/Interventions Provided:  Interventions: DME  Referral Comments: [facility]    Would you like to participate in our [facility] service program?  : No - Declined    Treatment Team Recommendation: Home with Home Health Care  Discharge Destination Plan:: Home  Transport at Discharge : Family               100 CM met with patient at the bedside, baseline information was obtained. CM discussed the role of CM in helping the patient develop a discharge plan and assist the patient in carry out their plan.     Patient  lives in 2 story home with first floor bedroom and bathroom, daughter (age 18) lives on second floor primarily.  Patient ambulates with RW, spouse assists with ADLs.    CM discussed with patient therapy recommendation for HHC and patient declined indicating she has therapy paperwork at home she can do, if required, and VN discussions are repetitive.     CM discussed therapy recommendation for [facility] and patient agreeable.    CM submitted [place] referral for BSC.    1200 CM met with patient and provided BSC and paperwork for POA and Living Will, as requested.

## 2024-07-19 NOTE — UTILIZATION REVIEW
Initial Clinical Review    Admission: Date/Time/Statement:   Admission Orders (From admission, onward)       Ordered        07/18/24 1356  Place in Observation  Once                          Orders Placed This Encounter   Procedures    Place in Observation     Standing Status:   Standing     Number of Occurrences:   1     Order Specific Question:   Level of Care     Answer:   Med Surg [16]     ED Arrival Information       Expected   -    Arrival   7/18/2024 11:37    Acuity   Urgent              Means of arrival   Ambulance    Escorted by   [facility]    Service   Hospitalist    Admission type   Emergency              Arrival complaint   Fall - Back Pain             Chief Complaint   Patient presents with    Fall     Fall this morning and having back and neck pain       Initial Presentation: 53 y.o. female who presented by EMS to [facility] ED. Admitted as Observation for evaluation and treatment of Intractable back pain.      PMHx:  has a past medical history of Carcinoid tumor, CHF (congestive heart failure) (HCC), and Cirrhosis of liver (HCC).      Presented w/ pt s/p fall at home this morning and is now having neck and back pain. Reports she's on lovenox injections. Abd bruised from the injections Reports 10/10 pain scale. Given IV dilaudid x2 and Fentanyl x1 in ED. . On exam, pt w/ abdominal tenderness. Decreased range of motion of the lower extremities due to discomfort in the back.  Diffuse T/L-midline and paraspinal tenderness on exam. Labs AST 48, Alk phos 177.   Plan: Continue multimodal pain management, Lidocaine patch and narcotics as needed, PT/OT.       ED Triage Vitals [07/18/24 1140]   Temperature Pulse Respirations Blood Pressure SpO2 Pain Score   (!) 97.4 °F (36.3 °C) 92 16 139/74 98 % 9     Weight (last 2 days)       Date/Time Weight    07/18/24 1140 121 (266)            Vital Signs (last 3 days)       Date/Time Temp Pulse Resp BP MAP (mmHg) SpO2 O2 Device Patient Position -  Orthostatic VS [place] Coma Scale Score Pain    07/19/24 0914 -- -- -- -- -- -- -- -- -- 10 - Worst Possible Pain     07/19/24 0913 -- -- -- -- -- -- -- -- -- 10 - Worst Possible Pain    07/19/24 0648 -- -- -- -- -- -- -- -- -- 10 - Worst Possible Pain    07/19/24 06:45:57 97.7 °F (36.5 °C) 89 18 127/67 87 97 % -- -- -- --    07/19/24 0400 -- -- -- -- -- -- -- -- -- 10 - Worst Possible Pain    07/18/24 2203 -- -- -- -- -- -- -- -- -- 10 - Worst Possible Pain    07/18/24 21:46:58 97.9 °F (36.6 °C) 94 -- 135/71 92 99 % -- -- -- --    07/18/24 2030 -- -- -- -- -- 96 % None (Room air) -- 15 --    07/18/24 17:39:54 -- 99 -- 108/50 69 93 % -- -- -- --    07/18/24 15:11:07 97.9 °F (36.6 °C) 93 16 127/65 86 94 % -- -- -- --    07/18/24 1430 -- 93 17 118/60 -- 95 % None (Room air) Lying 15 --    07/18/24 1400 -- 91 19 117/63 -- 99 % None (Room air) Lying 15 --    07/18/24 1358 -- -- -- -- -- -- -- -- -- 8    07/18/24 1330 -- 92 18 139/64 -- 94 % None (Room air) Lying 15 --    07/18/24 1300 -- 93 17 126/61 -- 96 % None (Room air) Lying 15 9    07/18/24 1257 -- -- -- -- -- -- -- -- -- 9    07/18/24 1245 -- 94 17 137/73 -- 95 % None (Room air) Lying 15 9    07/18/24 1230 -- 95 17 129/67 -- 95 % None (Room air) Lying 15 9    07/18/24 1215 -- 93 17 126/66 -- 95 % None (Room air) Lying 15 9    07/18/24 1210 -- -- -- -- -- -- -- -- -- 9    07/18/24 1200 -- 95 16 124/58 -- 96 % None (Room air) Lying 15 9    07/18/24 1150 -- 91 16 138/74 -- 95 % None (Room air) Lying 15 9    07/18/24 1140 97.4 °F (36.3 °C) 92 16 139/74 -- 98 % None (Room air) -- 15 9              Pertinent Labs/Diagnostic Test Results:   Radiology:  CT recon only thoracolumbar   Final Interpretation by Raffaele Barry MD (07/18 2422)      No fracture or traumatic subluxation.               Workstation performed: [id number]         TRAUMA - CT head wo contrast   Final Interpretation by Raffaele Barry MD (07/18 0516)      No acute intracranial  abnormality.      No cervical spine fracture or traumatic malalignment.         The study was marked in EPIC for immediate notification.      Workstation performed: [id number]         TRAUMA - CT spine cervical wo contrast   Final Interpretation by Raffaele Barry MD (07/18 1213)      No acute intracranial abnormality.      No cervical spine fracture or traumatic malalignment.         The study was marked in EPIC for immediate notification.      Workstation performed: [id number]         TRAUMA - CT chest abdomen pelvis w contrast   Final Interpretation by Raffaele Barry MD (07/18 1224)   Addendum (preliminary) 1 of 1 by Raffaele Barry MD (07/18 1224)   ADDENDUM:      The study was marked in EPIC for immediate notification.      Final      No acute findings in the chest, abdomen, or pelvis.      Cirrhosis and sequela of portal hypertension.                  Workstation performed: [id number]         XR Trauma chest portable   Final Interpretation by Katie Otero MD (07/18 1209)      No acute cardiopulmonary disease.      No acute displaced fractures. See subsequent chest CT.         Workstation performed: [id number]         XR Trauma pelvis ap only 1 or 2 vw   Final Interpretation by Raffaele Barry MD (07/18 1223)      No acute osseous abnormality.         Computerized Assisted Algorithm (CAA) may have been used to analyze all applicable images.         Workstation performed: [id number]                 Results from last 7 days   Lab Units 07/18/24  1146   WBC Thousand/uL 2.45*   HEMOGLOBIN g/dL 11.5   HEMATOCRIT % 34.5*   PLATELETS Thousands/uL 84*   TOTAL NEUT ABS Thousands/µL 1.70*         Results from last 7 days   Lab Units 07/18/24  1146   SODIUM mmol/L 138   POTASSIUM mmol/L 4.0   CHLORIDE mmol/L 104   CO2 mmol/L 27   ANION GAP mmol/L 7   BUN mg/dL 24   CREATININE mg/dL 1.30   EGFR ml/min/1.73sq m 46   CALCIUM mg/dL 8.9     Results from last 7 days   Lab Units  07/18/24  1459 07/18/24  1146   AST U/L  --  48*   ALT U/L  --  28   ALK PHOS U/L  --  177*   TOTAL PROTEIN g/dL  --  8.0   ALBUMIN g/dL  --  3.3*   TOTAL BILIRUBIN mg/dL  --  1.55*   AMMONIA umol/L 53  --          Results from last 7 days   Lab Units 07/18/24  1146   GLUCOSE RANDOM mg/dL 104         Results from last 7 days   Lab Units 07/18/24  1146   PROTIME seconds 15.2*   INR  1.16   PTT seconds 28             Results from last 7 days   Lab Units 07/18/24  1302   CLARITY UA  Slightly Cloudy   COLOR UA  Yellow   SPEC GRAV UA  1.010   PH UA  7.0   GLUCOSE UA mg/dl Negative   KETONES UA mg/dl Negative   BLOOD UA  Small*   PROTEIN UA mg/dl Negative   NITRITE UA  Negative   BILIRUBIN UA  Negative   UROBILINOGEN UA E.U./dl 1.0   LEUKOCYTES UA  Negative   WBC UA /hpf 0-1   RBC UA /hpf 1-2   BACTERIA UA /hpf Occasional   EPITHELIAL CELLS WET PREP /hpf Occasional         ED Treatment-Medication Administration from 07/18/2024 1136 to 07/18/2024 1508         Date/Time Order Dose Route Action     07/18/2024 1210 fentaNYL injection 50 mcg 50 mcg Intravenous Given     07/18/2024 1155 iohexol (OMNIPAQUE) 350 MG/ML injection (MULTI-DOSE) 100 mL 100 mL Intravenous Given     07/18/2024 1257 HYDROmorphone (DILAUDID) injection 0.5 mg 0.5 mg Intravenous Given     07/18/2024 1358 HYDROmorphone (DILAUDID) injection 0.5 mg 0.5 mg Intravenous Given            Past Medical History:   Diagnosis Date    Carcinoid tumor     neuroendocrine    CHF (congestive heart failure) (HCC)     Cirrhosis of liver (HCC)      Present on Admission:   Intractable back pain   Hepatic encephalopathy (HCC)   Other cirrhosis of liver (HCC)   Sarcoidosis   History of malignant carcinoid tumor of small intestine      Admitting Diagnosis: Back pain [M54.9]  Unspecified multiple injuries, initial encounter [T07.XXXA]  Age/Sex: 53 y.o. female  Admission Orders:  Scheduled Medications:  amitriptyline, 75 mg, Oral, HS  dapsone, 50 mg, Oral, Daily  enoxaparin, 40 mg,  Subcutaneous, Daily  folic acid, 1 mg, Oral, Daily  gabapentin, 100 mg, Oral, BID  hydroxychloroquine, 200 mg, Oral, BID With Meals  lactulose, 20 g, Oral, 4x Daily  lidocaine, 1 patch, Topical, Daily  rifaximin, 550 mg, Oral, Q12H MIGUEL  sertraline, 100 mg, Oral, Daily  spironolactone, 50 mg, Oral, Daily  torsemide, 20 mg, Oral, BID AC      Continuous IV Infusions: NONE     PRN Meds:  HYDROmorphone, 0.5 mg, Intravenous, Q4H PRN - x2 given 7/18 @ 1711 & 2203, x2 given 7/19 @ 0400 & 0913  ondansetron, 4 mg, Intravenous, Q6H PRN  oxyCODONE, 5 mg, Oral, Q4H PRN    Network Utilization Review Department  ATTENTION: Please call with any questions or concerns to [phone] and carefully listen to the prompts so that you are directed to the right person. All voicemails are confidential.   For Discharge needs, contact Care Management DC Support Team at [phone] opt. [phone]  Send all requests for admission clinical reviews, approved or denied determinations and any other requests to dedicated fax number below belonging to the campus where the patient is receiving treatment. List of dedicated fax numbers for the Facilities:  FACILITY NAME UR FAX NUMBER   ADMISSION DENIALS (Administrative/Medical Necessity) [phone]   DISCHARGE SUPPORT TEAM (NETWORK) [phone]   PARENT CHILD HEALTH (Maternity/NICU/Pediatrics) [phone]   [facility] [phone]   [facility] [phone]   Replaced by [facility] [phone]   [facility] [phone]   [facility] [phone]   [facility] [phone]   [facility] [phone]   [facility] [phone]   [facility] [phone]   [facility] [phone]   [facility] [phone]   [facility]  [facility] [phone]

## 2024-07-19 NOTE — ASSESSMENT & PLAN NOTE
Status post mechanical fall at home  Brunt of the fall on the sacral area  Traumatic workup in the emergency room unrevealing however with intractable pain and unable to ambulate    Improved with multimodal pain management overnight  Now able to ambulate  Evaluated by PT/OT  Safe for discharge home

## 2024-07-19 NOTE — PLAN OF CARE
Problem: PHYSICAL THERAPY ADULT  Goal: Performs mobility at highest level of function for planned discharge setting.  See evaluation for individualized goals.  Description: Treatment/Interventions: Functional transfer training, LE strengthening/ROM, Therapeutic exercise, Elevations, Endurance training, Patient/family training, Cognitive reorientation, Equipment eval/education, Bed mobility, Gait training, Compensatory technique education, Spoke to nursing, OT          See flowsheet documentation for full assessment, interventions and recommendations.  Note: Prognosis: Good  Problem List: Decreased strength, Decreased endurance, Decreased range of motion, Impaired balance, Decreased mobility, Obesity, Pain  Assessment: Pt is a 53 y.o. female seen for PT evaluation s/p admission to [facility] on 7/18/2024 with Intractable back pain.  Order placed for PT services.  Upon evaluation: Pt is presenting with impaired functional mobility due to pain, decreased strength, decreased endurance, impaired balance, gait deviations, impaired cognition, and fall risk requiring  SPV assistance for bed mobility and stand-by assistance for transfers and ambulation with RW . Pt's clinical presentation is currently unstable/unpredictable given the functional mobility deficits above, especially weakness, pain, and decreased activity tolerance, coupled with fall risks as indicated by AM-PAC 6-Clicks: 18/24 as well as hx of falls, polypharmacy, and obesity and combined with medical complications of abnormal WBCs, abnormal potassium values, abnormal CO2 values, and need for input for mobility technique/safety.  Pt's PMHx and comorbidities that may affect physical performance and progress include: CHF and sarcoidosis, liver cirrhosis . Personal factors affecting pt at time of IE include: anxiety, step(s) to enter environment, past experience, behavioral pattern, inability to perform IADLs, and recent fall(s)/fall history.  Pt will benefit from continued skilled PT services to address deficits as defined above and to maximize level of functional mobility to facilitate return toward PLOF and improved QOL. From PT/mobility standpoint, recommendation at time of d/c would be Level III (Minimum Resource Intensity) and with walker pending progress in order to reduce fall risk and maximize pt's functional independence and consistency with mobility in order to facilitate return to PLOF.  Recommend trial with walker next 1-2 sessions and ther ex next 1-2 sessions.  Barriers to Discharge: None     Rehab Resource Intensity Level, PT: III (Minimum Resource Intensity)    See flowsheet documentation for full assessment.

## 2024-07-19 NOTE — DISCHARGE SUMMARY
[facility]  Discharge- Bailey Olsen 1970, 53 y.o. female MRN: [id number]  Unit/Bed#: MS Zaidi-01 Encounter: [id number]  Primary Care Provider: Nayely Brown DO   Date and time admitted to hospital: 7/18/2024 11:37 AM    * Intractable back pain  Assessment & Plan  Status post mechanical fall at home  Brunt of the fall on the sacral area  Traumatic workup in the emergency room unrevealing however with intractable pain and unable to ambulate    Improved with multimodal pain management overnight  Now able to ambulate  Evaluated by PT/OT  Safe for discharge home    Hepatic encephalopathy (HCC)  Assessment & Plan  History of hepatic encephalopathy on rifaximin and lactulose at home  Ammonia level stable  continue with home lactulose and rifaximin dosing    Sarcoidosis  Assessment & Plan  Known history; follows with outside facility  Continue home dapsone    History of malignant carcinoid tumor of small intestine  Assessment & Plan  History noted  Cont with routine outpatient follow up    Other cirrhosis of liver (HCC)  Assessment & Plan  Appears compensated  Secondary to sarcoidosis?  Continue with home lactulose, rifaximin, diuretics      Medical Problems       Resolved Problems  Date Reviewed: 7/19/2024   None       Discharging Physician / Practitioner: Shahab Stearns DO  PCP: Nayely Brown DO  Admission Date:   Admission Orders (From admission, onward)       Ordered        07/18/24 1356  Place in Observation  Once                          Discharge Date: 07/19/24    Consultations During Hospital Stay:  PT/OT    Procedures Performed:   none    Significant Findings / Test Results:   none    Incidental Findings:   none     Test Results Pending at Discharge (will require follow up):   none     Outpatient Tests Requested:  none    Complications:  none    Reason for Admission: low back pain    Hospital Course:   Bailey Olsen is a 53 y.o. female patient who originally  "presented to the hospital on 7/18/2024 due to back pain after fall at home. Fortunately trauma workup was unreavling. However patient had persistent intractable pain and could not ambulate in the ED. She was admitted for overnight observation and pain management. BY the am she was able to ambulate and was cleared for discharge home by PT/OT. Otherwise hospitalization was uncomplicated.  No changes to home meds. Patient instructed to follow up with PCP in one week.     Please see above list of diagnoses and related plan for additional information.     Condition at Discharge: good    Discharge Day Visit / Exam:   Subjective:  seen and examined on the day of discharge. No events overnight. Pain much improved. Ambulating.   Vitals: Blood Pressure: 127/67 (07/19/24 0645)  Pulse: 89 (07/19/24 0645)  Temperature: 97.7 °F (36.5 °C) (07/19/24 0645)  Temp Source: Temporal (07/18/24 1140)  Respirations: 18 (07/19/24 0645)  Height: 5' 5\" (165.1 cm) (07/18/24 1140)  Weight - Scale: 121 kg (266 lb) (07/18/24 1140)  SpO2: 97 % (07/19/24 0645)  PHYSICAL EXAM:    Vitals signs reviewed  Constitutional   Awake and cooperative. NAD.   Head/Neck   Normocephalic. Atraumatic.   HEENT   No scleral icterus. EOMI.   Heart   Regular rate and rhythm. No murmers.   Lungs   Clear to auscultation bilaterally. Respirations unlaboured.   Abdomen   Soft. Nontender. Nondistended.    Skin   Skin color normal. No rashes.   Extremities   No deformities. No peripheral edema.   Neuro   Alert and oriented. No new deficits.   Psych   Mood stable. Affect normal.         Discussion with Family: Updated  () at bedside.    Discharge instructions/Information to patient and family:   See after visit summary for information provided to patient and family.      Provisions for Follow-Up Care:  See after visit summary for information related to follow-up care and any pertinent home health orders.      Mobility at time of Discharge:   Basic " Mobility Inpatient Raw Score: 18  JH-HLM Goal: 6: Walk 10 steps or more  JH-HLM Achieved: 7: Walk 25 feet or more  HLM Goal achieved. Continue to encourage appropriate mobility.     Disposition:   Home    Planned Readmission: no     Discharge Statement:  I spent 45 minutes discharging the patient. This time was spent on the day of discharge. I had direct contact with the patient on the day of discharge. Greater than 50% of the total time was spent examining patient, answering all patient questions, arranging and discussing plan of care with patient as well as directly providing post-discharge instructions.  Additional time then spent on discharge activities.    Discharge Medications:  See after visit summary for reconciled discharge medications provided to patient and/or family.      **Please Note: This note may have been constructed using a voice recognition system**

## 2024-07-19 NOTE — PLAN OF CARE
Problem: PAIN - ADULT  Goal: Verbalizes/displays adequate comfort level or baseline comfort level  Description: Interventions:  - Encourage patient to monitor pain and request assistance  - Assess pain using appropriate pain scale  - Administer analgesics based on type and severity of pain and evaluate response  - Implement non-pharmacological measures as appropriate and evaluate response  - Consider cultural and social influences on pain and pain management  - Notify physician/advanced practitioner if interventions unsuccessful or patient reports new pain  Outcome: Progressing     Problem: INFECTION - ADULT  Goal: Absence or prevention of progression during hospitalization  Description: INTERVENTIONS:  - Assess and monitor for signs and symptoms of infection  - Monitor lab/diagnostic results  - Monitor all insertion sites, i.e. indwelling lines, tubes, and drains  - Monitor endotracheal if appropriate and nasal secretions for changes in amount and color  - [place] appropriate cooling/warming therapies per order  - Administer medications as ordered  - Instruct and encourage patient and family to use good hand hygiene technique  - Identify and instruct in appropriate isolation precautions for identified infection/condition  Outcome: Progressing  Goal: Absence of fever/infection during neutropenic period  Description: INTERVENTIONS:  - Monitor WBC    Outcome: Progressing     Problem: SAFETY ADULT  Goal: Patient will remain free of falls  Description: INTERVENTIONS:  - Educate patient/family on patient safety including physical limitations  - Instruct patient to call for assistance with activity   - Consult OT/PT to assist with strengthening/mobility   - Keep Call bell within reach  - Keep bed low and locked with side rails adjusted as appropriate  - Keep care items and personal belongings within reach  - Initiate and maintain comfort rounds  - Make Fall Risk Sign visible to staff  - Offer Toileting every  Hours,  in advance of need  - Initiate/Maintain alarm  - Obtain necessary fall risk management equipment:   - Apply yellow socks and bracelet for high fall risk patients  - Consider moving patient to room near nurses station  Outcome: Progressing  Goal: Maintain or return to baseline ADL function  Description: INTERVENTIONS:  -  Assess patient's ability to carry out ADLs; assess patient's baseline for ADL function and identify physical deficits which impact ability to perform ADLs (bathing, care of mouth/teeth, toileting, grooming, dressing, etc.)  - Assess/evaluate cause of self-care deficits   - Assess range of motion  - Assess patient's mobility; develop plan if impaired  - Assess patient's need for assistive devices and provide as appropriate  - Encourage maximum independence but intervene and supervise when necessary  - Involve family in performance of ADLs  - Assess for home care needs following discharge   - Consider OT consult to assist with ADL evaluation and planning for discharge  - Provide patient education as appropriate  Outcome: Progressing  Goal: Maintains/Returns to pre admission functional level  Description: INTERVENTIONS:  - Perform AM-PAC 6 Click Basic Mobility/ Daily Activity assessment daily.  - Set and communicate daily mobility goal to care team and patient/family/caregiver.   - Collaborate with rehabilitation services on mobility goals if consulted  - Perform Range of Motion  times a day.  - Reposition patient every  hours.  - Dangle patient  times a day  - Stand patient  times a day  - Ambulate patient  times a day  - Out of bed to chair  times a day   - Out of bed for meals  times a day  - Out of bed for toileting  - Record patient progress and toleration of activity level   Outcome: Progressing     Problem: DISCHARGE PLANNING  Goal: Discharge to home or other facility with appropriate resources  Description: INTERVENTIONS:  - Identify barriers to discharge w/patient and caregiver  - Arrange for  needed discharge resources and transportation as appropriate  - Identify discharge learning needs (meds, wound care, etc.)  - Arrange for interpretive services to assist at discharge as needed  - Refer to Case Management Department for coordinating discharge planning if the patient needs post-hospital services based on physician/advanced practitioner order or complex needs related to functional status, cognitive ability, or social support system  Outcome: Progressing     Problem: Knowledge Deficit  Goal: Patient/family/caregiver demonstrates understanding of disease process, treatment plan, medications, and discharge instructions  Description: Complete learning assessment and assess knowledge base.  Interventions:  - Provide teaching at level of understanding  - Provide teaching via preferred learning methods  Outcome: Progressing     Problem: Prexisting or High Potential for Compromised Skin Integrity  Goal: Skin integrity is maintained or improved  Description: INTERVENTIONS:  - Identify patients at risk for skin breakdown  - Assess and monitor skin integrity  - Assess and monitor nutrition and hydration status  - Monitor labs   - Assess for incontinence   - Turn and reposition patient  - Assist with mobility/ambulation  - Relieve pressure over bony prominences  - Avoid friction and shearing  - Provide appropriate hygiene as needed including keeping skin clean and dry  - Evaluate need for skin moisturizer/barrier cream  - Collaborate with interdisciplinary team   - Patient/family teaching  - Consider wound care consult   Outcome: Progressing

## 2024-07-19 NOTE — OCCUPATIONAL THERAPY NOTE
Occupational Therapy Evaluation     Patient Name: Bailey Olsen  Today's Date: 7/19/2024  Problem List  Principal Problem:    Intractable back pain  Active Problems:    Other cirrhosis of liver (HCC)    History of malignant carcinoid tumor of small intestine    Sarcoidosis    Hepatic encephalopathy (HCC)    Past Medical History  Past Medical History:   Diagnosis Date    Carcinoid tumor     neuroendocrine    CHF (congestive heart failure) (HCC)     Cirrhosis of liver (HCC)      Past Surgical History  History reviewed. No pertinent surgical history.       07/19/24 0914   Note Type   Note type Evaluation   Pain Assessment   Pain Assessment Tool 0-10   Pain Score (S)  10 - Worst Possible Pain   Pain Location/Orientation Orientation: Lower;Location: Back   Restrictions/Precautions   Other Precautions Cognitive;Chair Alarm;Bed Alarm;Fall Risk;Pain   Home Living   Type of Home House  (1 FABRICIO)   Home Layout Two level;Performs ADLs on one level;Able to live on main level with bedroom/bathroom   Bathroom Shower/Tub Tub/shower unit   Bathroom Toilet Standard   Bathroom Equipment Grab bars in shower;Grab bars around toilet   Home Equipment Walker   Additional Comments Pt reports living in a 2SH with 1 FABRICIO and uses RW for functional mobility PRN.   Prior Function   Level of [place] Independent with ADLs;Independent with functional mobility   Lives With Spouse;Daughter   Receives Help From Family   IADLs Family/Friend/Other provides transportation;Family/Friend/Other provides meals;Independent with medication management   Falls in the last 6 months (S)  5 to 10   Comments PTA, pt reports independence with functional mobility, ADLs, and medication management- spouse and daughter typically cook/provide transportation.   ADL   UB Dressing Assistance 4  Minimal Assistance   UB Dressing Deficit Verbal cueing;Supervision/safety;Increased time to complete   LB Dressing Assistance 2  Maximal Assistance   LB Dressing Deficit Don/doff  R sock;Don/doff L sock   Additional Comments Pt requiring maximal assistance to don B socks while seated EOB. If she continues with 10/10 back pain, pt would require maximal assistance for LB ADLs and potentially minimal assistance for pull down shirts around her back.   Bed Mobility   Supine to Sit 5  Supervision  (HOB flat)   Additional items Increased time required;Verbal cues;Bedrails   Additional Comments Pt received supine in bed upon start of session. Pt supine > sit EOB with supervision and use of bedrail.   Transfers   Sit to Stand   (SBA)   Additional items Increased time required;Verbal cues   Stand to Sit   (SBA)   Additional items Increased time required;Verbal cues   Stand pivot   (SBA)   Additional items Increased time required;Verbal cues   Additional Comments All functional transfers with RW. Pt sit <> stand with SBA. Pt spt with SBA.   Functional Mobility   Functional Mobility   (SBA)   Additional Comments Pt participated in functional household distance in room and in hallway with SBA and RW.   Balance   Static Sitting Good   Dynamic Sitting Fair +   Static Standing Fair   Dynamic Standing Fair -   Activity Tolerance   Activity Tolerance Patient limited by fatigue;Patient limited by pain   Medical Staff Made Aware PTOtoniel   Nurse Made Aware Annette WRAY Assessment   RUE Assessment WFL  (Demonstrated decreased shoulder AROM likely due to increased back pain, however still functional)   LUE Assessment   LUE Assessment WFL  (Demonstrated decreased shoulder AROM likely due to increased back pain, however still functional)   Hand Function   Gross Motor Coordination Functional   Fine Motor Coordination Functional   Sensation   Light Touch No apparent deficits   Cognition   Overall Cognitive Status Impaired   Arousal/Participation Alert;Cooperative   Attention Within functional limits   Orientation Level Oriented to person;Oriented to situation  (Partially to place- knows we are in a hospital,  thinks we are in [place]. Partially to time with month, disoriented to year.)   Following Commands Follows one step commands with increased time or repetition   Comments Hepatic encephalopathy noted in chart   Assessment   Limitation Decreased ADL status;Decreased UE strength;Decreased Safe judgement during ADL;Decreased cognition;Decreased endurance;Decreased self-care trans;Decreased high-level ADLs   Prognosis Fair   Assessment Pt is a 53 y.o. female, [facility] d/t experiencing fall. Dx: Intractable back pain. Pt with PMHx impacting their performance during ADL tasks, including: sarcoidosis, other cirrhosis of liver, psoriasis, neuroendocrine carcinoid tumor, chronic diastolic CHF. Prior to admission to the hospital Pt was performing ADLs without physical assistance. Most IADLs with physical assistance. Functional transfers/ambulation without physical assistance. Cognitive status PTA was WFL. OT order placed to assess Pt's ADLs, cognitive status, and performance during functional tasks in order to maximize safety and independence while making most appropriate d/c recommendations. PT/OT co-evaluation completed at this time d/t significant mobility deficits and safety concerns. Pt's clinical presentation is currently unstable/unpredictable given new onset deficits that affect Pt's occupational performance and ability to safely return to PLOF including decrease activity tolerance, decrease standing balance, decrease performance during ADL tasks, decrease cognition, decrease safety awareness , increased pain, decrease performance during functional transfers, steps to enter home, limited family support, frequent falls, high fall risk, and limited insight to deficits combined with medical complications of pain impacting overall mobility status, change in mental status, abnormal CBC, and need for input for mobility technique/safety. Personal factors affecting Pt at time of initial evaluation include:  step(s) to enter environment, multi-level environment, limited home support, inability to perform IADLs, inability to navigate community distances, limited insight into impairments, decreased initiation and engagement, and recent fall(s)/fall history. Pt will benefit from continued skilled OT services to address deficits as defined above and to maximize level independence/participation during ADLs and functional tasks to facilitate return toward PLOF and improved quality of life. From an occupational therapy standpoint, Level III (Minimum Resource Intensity) is recommended upon d/c.   Plan   Treatment Interventions ADL retraining;Functional transfer training;UE strengthening/ROM;Endurance training;Patient/family training;Equipment evaluation/education;Compensatory technique education;Continued evaluation;Energy conservation;Activityengagement;Cognitive reorientation   Goal Expiration Date 08/02/24   OT Frequency 2-3x/wk   Discharge Recommendation   Rehab Resource Intensity Level, OT III (Minimum Resource Intensity)      (Would benefit from LHAE)   Equipment Recommended (S)  Bedside commode   Commode Type (S)  Standard w/ drop arm   AM-PAC Daily Activity Inpatient   Lower Body Dressing 2   Bathing 3   Toileting 3   Upper Body Dressing 3   Grooming 3   Eating 4   Daily Activity Raw Score 18   Daily Activity Standardized Score (Calc for Raw Score >=11) 38.66   AM-PAC Applied Cognition Inpatient   Following a Speech/Presentation 2   Understanding Ordinary Conversation 3   Taking Medications 2   Remembering Where Things Are Placed or Put Away 3   Remembering List of 4-5 Errands 3   Taking Care of Complicated Tasks 2   Applied Cognition Raw Score 15   Applied Cognition Standardized Score 33.54   End of Consult   Patient Position at End of Consult Supine;Bed/Chair alarm activated;All needs within reach     The patient's raw score on the AM-PAC Daily Activity Inpatient Short Form is 18. A raw score of less than 19 suggests  the patient may benefit from discharge to post-acute rehabilitation services. Despite AM-PAC score, pt is completing functional mobility with SBA and has support of family for assistance with ADLs PRN- would most benefit from returning home with continued support and skilled home OT services in which she could receive education on LHAE. Please refer to the recommendation of the Occupational Therapist for safe discharge planning.    Pt goals to be met by 8/2/2024:    Pt will demonstrate ability to complete grooming/hygiene tasks @ mod I after set-up.  Pt will demonstrate ability to complete supine<>sit @ mod I in order to increase safety and independence during ADL tasks.  Pt will demonstrate ability to complete UB ADLs including washing/dressing @ mod I in order to increase performance and participation during meaningful tasks  Pt will demonstrate ability to complete LB dressing @ mod I in order to increase safety and independence during meaningful tasks.   Pt will demonstrate ability to iain/doff socks/shoes while sitting EOB/chair @ mod I in order to increase safety and independence during meaningful tasks.   Pt will demonstrate ability to complete toileting tasks including CM and pericare @ mod I in order to increase safety and independence during meaningful tasks.  Pt will demonstrate ability to complete EOB, chair, toilet/commode transfers @ mod I in order to increase performance and participation during functional tasks.  Pt will demonstrate ability to stand for 10 minutes while maintaining F+ balance with use of RW for UB support PRN.  Pt will demonstrate ability to tolerate 20 minute OT session with no vc'ing for deep breathing or use of energy conservation techniques in order to increase activity tolerance during functional tasks.   Pt will demonstrate Good carryover of use of energy conservation/compensatory strategies during ADLs and functional tasks in order to increase safety and reduce risk for falls.    Pt will demonstrate Good attention and participation in continued evaluation of functional ambulation house hold distances in order to assist with safe d/c planning.  Pt will attend to continued cognitive assessments 100% of the time in order to provide most appropriate d/c recommendations.   Pt will follow 100% simple 2-step commands and be A&O x4 consistently with environmental cues to increase participation in functional activities.   Pt will identify 3 areas of interest/hobbies and 1 intervention on how to incorporate into daily life in order to increase interaction with environment and peers as well as increase participation in meaningful tasks.   Pt will demonstrate 100% carryover of BUE HEP in order to increase BUE MS and increase performance during functional tasks upon d/c home.    Ryan Farah MS, OTR/L

## 2024-07-19 NOTE — PHYSICAL THERAPY NOTE
PHYSICAL THERAPY EVALUATION      NAME:  Bailey Olsen  DATE: 07/19/24    AGE:   53 y.o.  Mrn:   [id number]  ADMIT DX:  Back pain [M54.9]  Unspecified multiple injuries, initial encounter [T07.XXXA]    Past Medical History:   Diagnosis Date    Carcinoid tumor     neuroendocrine    CHF (congestive heart failure) (HCC)     Cirrhosis of liver (HCC)      Length Of Stay: 0  Performed at least 2 patient identifiers during session: Name and Birthday           PHYSICAL THERAPY EVALUATION:       07/19/24 0912   PT Last Visit   PT Visit Date 07/19/24   Note Type   Note type Evaluation   Pain Assessment   Pain Assessment Tool 0-10   Pain Score 10 - Worst Possible Pain   Pain Location/Orientation Orientation: Left;Orientation: Lower;Location: Back   Restrictions/Precautions   Weight Bearing Precautions Per Order No   Other Precautions Cognitive;Chair Alarm;Bed Alarm;Fall Risk;Pain   Home Living   Type of Home House   Home Layout Two level;Performs ADLs on one level;Able to live on main level with bedroom/bathroom  (1 FABRICIO)   Bathroom Shower/Tub Tub/shower unit   Bathroom Toilet Standard   Bathroom Equipment Grab bars in shower;Grab bars around toilet   Home Equipment Walker   Prior Function   Level of [place] Independent with ADLs;Independent with functional mobility   Lives With Spouse;Daughter   Receives Help From Family   IADLs Independent with medication management;Family/Friend/Other provides transportation;Family/Friend/Other provides meals   Falls in the last 6 months 5 to 10   Comments Mod (I) with mobility using RW PRN   General   Family/Caregiver Present No   Cognition   Overall Cognitive Status Impaired   Arousal/Participation Cooperative   Orientation Level Oriented to person;Oriented to situation   Following Commands Follows one step commands without difficulty   RLE Assessment   RLE Assessment WFL   LLE Assessment   LLE Assessment WFL   Bed Mobility   Supine to Sit 5  Supervision   Additional items Increased  time required   Transfers   Sit to Stand   (SBA)   Stand to Sit   (SBA)   Stand pivot   (SBA)   Additional items Increased time required;Verbal cues   Additional Comments RW   Ambulation/Elevation   Gait pattern Improper Weight shift;Inconsistent cary   Gait Assistance   (SBA - SPV)   Assistive Device Rolling walker   Distance 104 ft   Balance   Static Sitting Good   Dynamic Sitting Fair +   Static Standing Fair   Dynamic Standing Fair -   Ambulatory Fair -   Activity Tolerance   Activity Tolerance Patient limited by fatigue;Patient limited by pain   Medical Staff Made Aware OT Pat   Nurse Made Aware Yes   Assessment   Prognosis Good   Problem List Decreased strength;Decreased endurance;Decreased range of motion;Impaired balance;Decreased mobility;Obesity;Pain   Barriers to Discharge None   Goals   STG Expiration Date 08/02/24   PT Treatment Day 0   Plan   Treatment/Interventions Functional transfer training;LE strengthening/ROM;Therapeutic exercise;Elevations;Endurance training;Patient/family training;Cognitive reorientation;Equipment eval/education;Bed mobility;Gait training;Compensatory technique education;Spoke to nursing;OT   PT Frequency 2-3x/wk   Discharge Recommendation   Rehab Resource Intensity Level, PT III (Minimum Resource Intensity)   Additional Comments using RW (pt owns)   AM-PAC Basic Mobility Inpatient   Turning in Flat Bed Without Bedrails 3   Lying on Back to Sitting on Edge of Flat Bed Without Bedrails 3   Moving Bed to Chair 3   Standing Up From Chair Using Arms 3   Walk in Room 3   Climb 3-5 Stairs With Railing 3   Basic Mobility Inpatient Raw Score 18   Basic Mobility Standardized Score 41.05   [facility] Highest Level Of Mobility   JH-HLM Goal 6: Walk 10 steps or more   JH-HLM Achieved 7: Walk 25 feet or more   End of Consult   Patient Position at End of Consult Supine;Bed/Chair alarm activated;All needs within reach   End of Consult Comments pt requesting to return to bed     (Please  find full objective findings from PT assessment regarding body systems outlined above).     Assessment: Pt is a 53 y.o. female seen for PT evaluation s/p admission to [facility] on 7/18/2024 with Intractable back pain.  Order placed for PT services.  Upon evaluation: Pt is presenting with impaired functional mobility due to pain, decreased strength, decreased endurance, impaired balance, gait deviations, impaired cognition, and fall risk requiring  SPV assistance for bed mobility and stand-by assistance for transfers and ambulation with RW . Pt's clinical presentation is currently unstable/unpredictable given the functional mobility deficits above, especially weakness, pain, and decreased activity tolerance, coupled with fall risks as indicated by AM-PAC 6-Clicks: 18/24 as well as hx of falls, polypharmacy, and obesity and combined with medical complications of abnormal WBCs, abnormal potassium values, abnormal CO2 values, and need for input for mobility technique/safety.  Pt's PMHx and comorbidities that may affect physical performance and progress include: CHF and sarcoidosis, liver cirrhosis . Personal factors affecting pt at time of IE include: anxiety, step(s) to enter environment, past experience, behavioral pattern, inability to perform IADLs, and recent fall(s)/fall history. Pt will benefit from continued skilled PT services to address deficits as defined above and to maximize level of functional mobility to facilitate return toward PLOF and improved QOL. From PT/mobility standpoint, recommendation at time of d/c would be Level III (Minimum Resource Intensity) and with walker pending progress in order to reduce fall risk and maximize pt's functional independence and consistency with mobility in order to facilitate return to PLOF.  Recommend trial with walker next 1-2 sessions and ther ex next 1-2 sessions.     The patient's AM-PAC Basic Mobility Inpatient Short Form Raw Score is 18. A Raw  score of greater than 16 suggests the patient may benefit from discharge to home. Please also refer to the recommendation of the Physical Therapist for safe discharge planning.       Goals: Pt will: Perform bed mobility tasks with modified I to reposition in bed and prepare for transfers. Pt will perform transfers with modified I to increase Indep in home environment and decrease burden of care and prepare for ambulation. Pt will ambulate with RW for >/= 250 ft with  modified I  to decrease risk for falls, improve activity tolerance, and promote proper use of assistive device and to access home environment. Pt will complete >/= 1 steps with RW with Supervision to return to home with FABRICIO. Pt will participate in objective balance assessment to determine baseline fall risk.          Otoniel Lafleur, PT,DPT

## 2024-07-19 NOTE — ASSESSMENT & PLAN NOTE
History of hepatic encephalopathy on rifaximin and lactulose at home  Ammonia level stable  continue with home lactulose and rifaximin dosing

## 2024-07-30 ENCOUNTER — APPOINTMENT (EMERGENCY)
Dept: CT IMAGING | Facility: HOSPITAL | Age: 54
DRG: 552 | End: 2024-07-30
Payer: COMMERCIAL

## 2024-07-30 ENCOUNTER — HOSPITAL ENCOUNTER (INPATIENT)
Facility: HOSPITAL | Age: 54
LOS: 2 days | Discharge: HOME HEALTH | DRG: 552 | End: 2024-08-03
Attending: EMERGENCY MEDICINE | Admitting: FAMILY MEDICINE
Payer: COMMERCIAL

## 2024-07-30 DIAGNOSIS — S32.10XA SACRAL FRACTURE (HCC): ICD-10-CM

## 2024-07-30 DIAGNOSIS — D86.9 SARCOIDOSIS: ICD-10-CM

## 2024-07-30 DIAGNOSIS — S32.010A CLOSED COMPRESSION FRACTURE OF BODY OF L1 VERTEBRA (HCC): ICD-10-CM

## 2024-07-30 DIAGNOSIS — M54.9 INTRACTABLE BACK PAIN: Primary | ICD-10-CM

## 2024-07-30 DIAGNOSIS — R26.2 AMBULATORY DYSFUNCTION: ICD-10-CM

## 2024-07-30 LAB
ALBUMIN SERPL BCG-MCNC: 3 G/DL (ref 3.5–5)
ALP SERPL-CCNC: 206 U/L (ref 34–104)
ALT SERPL W P-5'-P-CCNC: 19 U/L (ref 7–52)
AMMONIA PLAS-SCNC: 31 UMOL/L (ref 18–72)
ANION GAP SERPL CALCULATED.3IONS-SCNC: 5 MMOL/L (ref 4–13)
APTT PPP: 30 SECONDS (ref 23–37)
AST SERPL W P-5'-P-CCNC: 41 U/L (ref 13–39)
BASOPHILS # BLD AUTO: 0.02 THOUSANDS/ÂΜL (ref 0–0.1)
BASOPHILS NFR BLD AUTO: 1 % (ref 0–1)
BILIRUB SERPL-MCNC: 1.22 MG/DL (ref 0.2–1)
BILIRUB UR QL STRIP: NEGATIVE
BUN SERPL-MCNC: 8 MG/DL (ref 5–25)
CALCIUM ALBUM COR SERPL-MCNC: 8.8 MG/DL (ref 8.3–10.1)
CALCIUM SERPL-MCNC: 8 MG/DL (ref 8.4–10.2)
CHLORIDE SERPL-SCNC: 108 MMOL/L (ref 96–108)
CK SERPL-CCNC: 40 U/L (ref 26–192)
CLARITY UR: CLEAR
CO2 SERPL-SCNC: 25 MMOL/L (ref 21–32)
COLOR UR: YELLOW
CREAT SERPL-MCNC: 0.75 MG/DL (ref 0.6–1.3)
EOSINOPHIL # BLD AUTO: 0.05 THOUSAND/ÂΜL (ref 0–0.61)
EOSINOPHIL NFR BLD AUTO: 2 % (ref 0–6)
ERYTHROCYTE [DISTWIDTH] IN BLOOD BY AUTOMATED COUNT: 14 % (ref 11.6–15.1)
GFR SERPL CREATININE-BSD FRML MDRD: 90 ML/MIN/1.73SQ M
GLUCOSE SERPL-MCNC: 89 MG/DL (ref 65–140)
GLUCOSE UR STRIP-MCNC: NEGATIVE MG/DL
HCT VFR BLD AUTO: 31 % (ref 34.8–46.1)
HGB BLD-MCNC: 10.1 G/DL (ref 11.5–15.4)
HGB UR QL STRIP.AUTO: NEGATIVE
IMM GRANULOCYTES # BLD AUTO: 0.01 THOUSAND/UL (ref 0–0.2)
IMM GRANULOCYTES NFR BLD AUTO: 1 % (ref 0–2)
INR PPP: 1.24 (ref 0.84–1.19)
KETONES UR STRIP-MCNC: NEGATIVE MG/DL
LACTATE SERPL-SCNC: 0.8 MMOL/L (ref 0.5–2)
LEUKOCYTE ESTERASE UR QL STRIP: NEGATIVE
LYMPHOCYTES # BLD AUTO: 0.44 THOUSANDS/ÂΜL (ref 0.6–4.47)
LYMPHOCYTES NFR BLD AUTO: 20 % (ref 14–44)
MCH RBC QN AUTO: 32.4 PG (ref 26.8–34.3)
MCHC RBC AUTO-ENTMCNC: 32.6 G/DL (ref 31.4–37.4)
MCV RBC AUTO: 99 FL (ref 82–98)
MONOCYTES # BLD AUTO: 0.13 THOUSAND/ÂΜL (ref 0.17–1.22)
MONOCYTES NFR BLD AUTO: 6 % (ref 4–12)
NEUTROPHILS # BLD AUTO: 1.54 THOUSANDS/ÂΜL (ref 1.85–7.62)
NEUTS SEG NFR BLD AUTO: 70 % (ref 43–75)
NITRITE UR QL STRIP: NEGATIVE
NRBC BLD AUTO-RTO: 0 /100 WBCS
PH UR STRIP.AUTO: 8.5 [PH]
PLATELET # BLD AUTO: 74 THOUSANDS/UL (ref 149–390)
PMV BLD AUTO: 10 FL (ref 8.9–12.7)
POTASSIUM SERPL-SCNC: 3.9 MMOL/L (ref 3.5–5.3)
PROT SERPL-MCNC: 6.9 G/DL (ref 6.4–8.4)
PROT UR STRIP-MCNC: NEGATIVE MG/DL
PROTHROMBIN TIME: 15.9 SECONDS (ref 11.6–14.5)
RBC # BLD AUTO: 3.12 MILLION/UL (ref 3.81–5.12)
SODIUM SERPL-SCNC: 138 MMOL/L (ref 135–147)
SP GR UR STRIP.AUTO: 1.01 (ref 1–1.03)
UROBILINOGEN UR QL STRIP.AUTO: 4 E.U./DL
WBC # BLD AUTO: 2.19 THOUSAND/UL (ref 4.31–10.16)

## 2024-07-30 PROCEDURE — 82550 ASSAY OF CK (CPK): CPT | Performed by: EMERGENCY MEDICINE

## 2024-07-30 PROCEDURE — 74177 CT ABD & PELVIS W/CONTRAST: CPT

## 2024-07-30 PROCEDURE — 94664 DEMO&/EVAL PT USE INHALER: CPT

## 2024-07-30 PROCEDURE — 85610 PROTHROMBIN TIME: CPT | Performed by: EMERGENCY MEDICINE

## 2024-07-30 PROCEDURE — 36415 COLL VENOUS BLD VENIPUNCTURE: CPT | Performed by: EMERGENCY MEDICINE

## 2024-07-30 PROCEDURE — 82140 ASSAY OF AMMONIA: CPT | Performed by: EMERGENCY MEDICINE

## 2024-07-30 PROCEDURE — 96374 THER/PROPH/DIAG INJ IV PUSH: CPT

## 2024-07-30 PROCEDURE — 99285 EMERGENCY DEPT VISIT HI MDM: CPT | Performed by: EMERGENCY MEDICINE

## 2024-07-30 PROCEDURE — 83605 ASSAY OF LACTIC ACID: CPT | Performed by: EMERGENCY MEDICINE

## 2024-07-30 PROCEDURE — 85730 THROMBOPLASTIN TIME PARTIAL: CPT | Performed by: EMERGENCY MEDICINE

## 2024-07-30 PROCEDURE — 2W35X3Z IMMOBILIZATION OF BACK USING BRACE: ICD-10-PCS | Performed by: NEUROLOGICAL SURGERY

## 2024-07-30 PROCEDURE — 85025 COMPLETE CBC W/AUTO DIFF WBC: CPT | Performed by: EMERGENCY MEDICINE

## 2024-07-30 PROCEDURE — 96376 TX/PRO/DX INJ SAME DRUG ADON: CPT

## 2024-07-30 PROCEDURE — 80053 COMPREHEN METABOLIC PANEL: CPT | Performed by: EMERGENCY MEDICINE

## 2024-07-30 PROCEDURE — 99284 EMERGENCY DEPT VISIT MOD MDM: CPT

## 2024-07-30 PROCEDURE — 99223 1ST HOSP IP/OBS HIGH 75: CPT | Performed by: FAMILY MEDICINE

## 2024-07-30 PROCEDURE — 81003 URINALYSIS AUTO W/O SCOPE: CPT | Performed by: FAMILY MEDICINE

## 2024-07-30 RX ORDER — HYDROMORPHONE HCL/PF 1 MG/ML
0.5 SYRINGE (ML) INJECTION ONCE
Status: COMPLETED | OUTPATIENT
Start: 2024-07-30 | End: 2024-07-30

## 2024-07-30 RX ORDER — ENOXAPARIN SODIUM 100 MG/ML
40 INJECTION SUBCUTANEOUS EVERY 12 HOURS
Status: DISCONTINUED | OUTPATIENT
Start: 2024-07-30 | End: 2024-08-03 | Stop reason: HOSPADM

## 2024-07-30 RX ORDER — ACETAMINOPHEN 325 MG/1
500 TABLET ORAL EVERY 6 HOURS PRN
Status: DISCONTINUED | OUTPATIENT
Start: 2024-07-30 | End: 2024-08-03 | Stop reason: HOSPADM

## 2024-07-30 RX ORDER — BENZOCAINE/MENTHOL 6 MG-10 MG
LOZENGE MUCOUS MEMBRANE 2 TIMES DAILY
Status: DISCONTINUED | OUTPATIENT
Start: 2024-07-30 | End: 2024-08-03 | Stop reason: HOSPADM

## 2024-07-30 RX ORDER — POLYETHYLENE GLYCOL 3350 17 G/17G
17 POWDER, FOR SOLUTION ORAL DAILY PRN
Status: DISCONTINUED | OUTPATIENT
Start: 2024-07-30 | End: 2024-08-03 | Stop reason: HOSPADM

## 2024-07-30 RX ORDER — GABAPENTIN 100 MG/1
100 CAPSULE ORAL 2 TIMES DAILY
Status: DISCONTINUED | OUTPATIENT
Start: 2024-07-30 | End: 2024-07-31

## 2024-07-30 RX ORDER — HYDROMORPHONE HCL/PF 1 MG/ML
0.5 SYRINGE (ML) INJECTION EVERY 4 HOURS PRN
Status: DISCONTINUED | OUTPATIENT
Start: 2024-07-30 | End: 2024-07-30

## 2024-07-30 RX ORDER — SPIRONOLACTONE 25 MG/1
50 TABLET ORAL DAILY
Status: DISCONTINUED | OUTPATIENT
Start: 2024-07-30 | End: 2024-08-03 | Stop reason: HOSPADM

## 2024-07-30 RX ORDER — HYDROMORPHONE HCL/PF 1 MG/ML
1 SYRINGE (ML) INJECTION EVERY 4 HOURS PRN
Status: DISCONTINUED | OUTPATIENT
Start: 2024-07-30 | End: 2024-08-03 | Stop reason: HOSPADM

## 2024-07-30 RX ORDER — ONDANSETRON 2 MG/ML
4 INJECTION INTRAMUSCULAR; INTRAVENOUS EVERY 6 HOURS PRN
Status: DISCONTINUED | OUTPATIENT
Start: 2024-07-30 | End: 2024-08-03 | Stop reason: HOSPADM

## 2024-07-30 RX ORDER — LACTULOSE 10 G/15ML
20 SOLUTION ORAL 4 TIMES DAILY
Status: DISCONTINUED | OUTPATIENT
Start: 2024-07-30 | End: 2024-08-03 | Stop reason: HOSPADM

## 2024-07-30 RX ORDER — FOLIC ACID 1 MG/1
1 TABLET ORAL DAILY
Status: DISCONTINUED | OUTPATIENT
Start: 2024-07-30 | End: 2024-08-03 | Stop reason: HOSPADM

## 2024-07-30 RX ORDER — LIDOCAINE 50 MG/G
1 PATCH TOPICAL DAILY
Status: DISCONTINUED | OUTPATIENT
Start: 2024-07-30 | End: 2024-08-03 | Stop reason: HOSPADM

## 2024-07-30 RX ORDER — TORSEMIDE 20 MG/1
20 TABLET ORAL
Status: DISCONTINUED | OUTPATIENT
Start: 2024-07-30 | End: 2024-08-03 | Stop reason: HOSPADM

## 2024-07-30 RX ORDER — SERTRALINE HYDROCHLORIDE 25 MG/1
100 TABLET, FILM COATED ORAL DAILY
Status: DISCONTINUED | OUTPATIENT
Start: 2024-07-30 | End: 2024-08-03 | Stop reason: HOSPADM

## 2024-07-30 RX ORDER — ALBUTEROL SULFATE 90 UG/1
2 AEROSOL, METERED RESPIRATORY (INHALATION) EVERY 4 HOURS PRN
Status: DISCONTINUED | OUTPATIENT
Start: 2024-07-30 | End: 2024-08-03 | Stop reason: HOSPADM

## 2024-07-30 RX ORDER — DAPSONE 25 MG/1
50 TABLET ORAL DAILY
Status: DISCONTINUED | OUTPATIENT
Start: 2024-07-30 | End: 2024-08-03 | Stop reason: HOSPADM

## 2024-07-30 RX ORDER — HYDROXYCHLOROQUINE SULFATE 200 MG/1
200 TABLET, FILM COATED ORAL 2 TIMES DAILY WITH MEALS
Status: DISCONTINUED | OUTPATIENT
Start: 2024-07-30 | End: 2024-08-03 | Stop reason: HOSPADM

## 2024-07-30 RX ORDER — AMITRIPTYLINE HYDROCHLORIDE 25 MG/1
75 TABLET, FILM COATED ORAL
Status: DISCONTINUED | OUTPATIENT
Start: 2024-07-30 | End: 2024-08-03 | Stop reason: HOSPADM

## 2024-07-30 RX ADMIN — TORSEMIDE 20 MG: 20 TABLET ORAL at 16:30

## 2024-07-30 RX ADMIN — Medication 5000 UNITS: at 16:30

## 2024-07-30 RX ADMIN — HYDROMORPHONE HYDROCHLORIDE 1 MG: 1 INJECTION, SOLUTION INTRAMUSCULAR; INTRAVENOUS; SUBCUTANEOUS at 22:56

## 2024-07-30 RX ADMIN — FOLIC ACID 1 MG: 1 TABLET ORAL at 16:30

## 2024-07-30 RX ADMIN — HYDROMORPHONE HYDROCHLORIDE 0.5 MG: 1 INJECTION, SOLUTION INTRAMUSCULAR; INTRAVENOUS; SUBCUTANEOUS at 16:53

## 2024-07-30 RX ADMIN — AMITRIPTYLINE HYDROCHLORIDE 75 MG: 25 TABLET, FILM COATED ORAL at 21:04

## 2024-07-30 RX ADMIN — LIDOCAINE 5% 1 PATCH: 700 PATCH TOPICAL at 16:05

## 2024-07-30 RX ADMIN — SERTRALINE HYDROCHLORIDE 100 MG: 25 TABLET ORAL at 16:30

## 2024-07-30 RX ADMIN — DAPSONE 50 MG: 25 TABLET ORAL at 16:29

## 2024-07-30 RX ADMIN — ACETAMINOPHEN 325MG 488 MG: 325 TABLET ORAL at 16:30

## 2024-07-30 RX ADMIN — HYDROMORPHONE HYDROCHLORIDE 0.5 MG: 1 INJECTION, SOLUTION INTRAMUSCULAR; INTRAVENOUS; SUBCUTANEOUS at 14:43

## 2024-07-30 RX ADMIN — HYDROCORTISONE 1 APPLICATION: 1 CREAM TOPICAL at 17:47

## 2024-07-30 RX ADMIN — IOHEXOL 100 ML: 350 INJECTION, SOLUTION INTRAVENOUS at 13:36

## 2024-07-30 RX ADMIN — LACTULOSE 20 G: 10 SOLUTION ORAL at 17:44

## 2024-07-30 RX ADMIN — LACTULOSE 20 G: 10 SOLUTION ORAL at 21:04

## 2024-07-30 RX ADMIN — GABAPENTIN 100 MG: 100 CAPSULE ORAL at 17:45

## 2024-07-30 RX ADMIN — HYDROMORPHONE HYDROCHLORIDE 0.5 MG: 1 INJECTION, SOLUTION INTRAMUSCULAR; INTRAVENOUS; SUBCUTANEOUS at 12:27

## 2024-07-30 RX ADMIN — HYDROXYCHLOROQUINE SULFATE 200 MG: 200 TABLET, FILM COATED ORAL at 17:45

## 2024-07-30 RX ADMIN — SPIRONOLACTONE 50 MG: 25 TABLET ORAL at 16:30

## 2024-07-30 RX ADMIN — ONDANSETRON 4 MG: 2 INJECTION INTRAMUSCULAR; INTRAVENOUS at 17:50

## 2024-07-30 RX ADMIN — ENOXAPARIN SODIUM 40 MG: 40 INJECTION SUBCUTANEOUS at 17:45

## 2024-07-30 NOTE — ASSESSMENT & PLAN NOTE
Imagin.  Recent subchondral fracture of the L1 superior endplate with mild loss of height. There is air density which is predominantly centered within the subchondral cleft, consistent with osteonecrosis/Kummel disease.   2.  Recent fracture of the lower sacral segment.     Ambulatory dysfunction due to intractable pain secondary to L1 superior endplate and lower sacral segment fracture  Continue pain management, PT OT, neurovascular checks.  Patient had recent history of fall about 2 weeks ago and recently got discharged from this hospital.

## 2024-07-30 NOTE — ASSESSMENT & PLAN NOTE
Wt Readings from Last 3 Encounters:   07/30/24 128 kg (282 lb 6.6 oz)   07/18/24 121 kg (266 lb)   07/08/24 121 kg (266 lb 12.8 oz)     Not in acute exacerbation,  Continue home medication, low-salt diet, fluid restriction , intake and output  Continue torsemide  2D echo done 05/2024 was normal EF.

## 2024-07-30 NOTE — ED PROVIDER NOTES
History  Chief Complaint   Patient presents with    Back Pain     Pt arrives via EMS with c/o left body pain (abd, back, arm, leg) since fall last week. Pt reports she is taking oxycodone and flexeril for the pain without relief. Unable to ambulate due to pain .     Patient is a 53-year-old female with a history of cirrhosis, presenting to the emergency department complaining of persistent left abdominal and low back pain from a fall she sustained on 718, she was initially seen at this facility after her fall, trauma scans were unremarkable, however patient reported intractable pain and was admitted to the hospital for pain control, subsequently she was discharged after having PT OT evaluation, she does report a history of frequent falls at home secondary to her cirrhosis, she reports taking oxycodone and Flexeril at home with no relief of symptoms, she has been seen by her PCP office 5 days ago, denies any new falls since the 18th, states she is unable to ambulate secondary to her pain, no fever or chills, no dysuria or hematuria, no bowel or bladder dysfunction, no numbness or tingling in the groin area        Prior to Admission Medications   Prescriptions Last Dose Informant Patient Reported? Taking?   Cholecalciferol (Vitamin D-3) 125 MCG (5000 UT) TABS Past Week  Yes Yes   Sig: Take 1 tablet by mouth once a week Tuesday   amitriptyline (ELAVIL) 75 mg tablet 7/29/2024  No Yes   Sig: Take 1 tablet (75 mg total) by mouth daily at bedtime   dapsone 25 mg tablet 7/29/2024  No Yes   Sig: Take 2 tablets (50 mg total) by mouth daily   folic acid (FOLVITE) 1 mg tablet 7/29/2024  Yes Yes   Sig: Take 1 mg by mouth daily   gabapentin (NEURONTIN) 100 mg capsule 7/29/2024  No Yes   Sig: Take 1 capsule (100 mg total) by mouth 2 (two) times a day   hydrocortisone 1 % cream Not Taking  No No   Sig: Apply topically 2 (two) times a day   Patient not taking: Reported on 7/30/2024   hydroxychloroquine (PLAQUENIL) 200 mg tablet  7/29/2024  Yes Yes   Sig: Take 200 mg by mouth 2 (two) times a day with meals   lactulose (CHRONULAC) 10 g/15 mL solution 7/29/2024  No Yes   Sig: Take 30 mL (20 g total) by mouth 4 (four) times a day Goal bowel movements is 4 a day   polyethylene glycol (MIRALAX) 17 g packet Past Week  No Yes   Sig: Take 17 g by mouth if needed (constipation) for up to 10 days   sertraline (ZOLOFT) 100 mg tablet 7/29/2024  No Yes   Sig: Take 1 tablet (100 mg total) by mouth daily   spironolactone (ALDACTONE) 50 mg tablet 7/29/2024  No Yes   Sig: Take 1 tablet (50 mg total) by mouth daily   torsemide (DEMADEX) 20 mg tablet 7/29/2024  No Yes   Sig: Take 1 tablet (20 mg total) by mouth 2 (two) times a day before meals      Facility-Administered Medications: None       Past Medical History:   Diagnosis Date    Carcinoid tumor     neuroendocrine    CHF (congestive heart failure) (HCC)     Cirrhosis of liver (HCC)        History reviewed. No pertinent surgical history.    Family History   Problem Relation Age of Onset    Hypertension Father      I have reviewed and agree with the history as documented.    E-Cigarette/Vaping    E-Cigarette Use Never User      E-Cigarette/Vaping Substances     Social History     Tobacco Use    Smoking status: Never    Smokeless tobacco: Never   Vaping Use    Vaping status: Never Used   Substance Use Topics    Alcohol use: Never    Drug use: Never       Review of Systems   Constitutional: Negative.    HENT: Negative.     Eyes: Negative.    Respiratory: Negative.     Cardiovascular: Negative.    Gastrointestinal:  Positive for abdominal pain.   Endocrine: Negative.    Genitourinary:  Positive for flank pain.   Musculoskeletal:  Positive for back pain.   Skin: Negative.    Allergic/Immunologic: Negative.    Neurological: Negative.    Hematological: Negative.    Psychiatric/Behavioral: Negative.         Physical Exam  Physical Exam  Constitutional:       Appearance: She is well-developed. She is obese.   HENT:       Head: Normocephalic and atraumatic.   Eyes:      Conjunctiva/sclera: Conjunctivae normal.      Pupils: Pupils are equal, round, and reactive to light.   Cardiovascular:      Rate and Rhythm: Normal rate.   Pulmonary:      Effort: Pulmonary effort is normal.   Abdominal:      Palpations: Abdomen is soft.   Musculoskeletal:         General: Normal range of motion.      Cervical back: Normal range of motion and neck supple.   Skin:     General: Skin is warm and dry.             Comments: Purpleish ecchymosis to the left posterior flank, tenderness to palpate in the surrounding area   Neurological:      Mental Status: She is alert and oriented to person, place, and time.         Vital Signs  ED Triage Vitals [07/30/24 1202]   Temperature Pulse Respirations Blood Pressure SpO2   98.3 °F (36.8 °C) 93 18 164/63 95 %      Temp Source Heart Rate Source Patient Position - Orthostatic VS BP Location FiO2 (%)   Temporal Monitor Sitting Right arm --      Pain Score       9           Vitals:    07/30/24 1202 07/30/24 1300 07/30/24 1445 07/30/24 1603   BP: 164/63 125/59 144/67 144/84   Pulse: 93 95 89 91   Patient Position - Orthostatic VS: Sitting Lying Lying          Visual Acuity      ED Medications  Medications   amitriptyline (ELAVIL) tablet 75 mg (has no administration in time range)   Cholecalciferol (VITAMIN D3) tablet 5,000 Units (5,000 Units Oral Given 7/30/24 1630)   dapsone tablet 50 mg (50 mg Oral Given 7/30/24 1629)   folic acid (FOLVITE) tablet 1 mg (1 mg Oral Given 7/30/24 1630)   gabapentin (NEURONTIN) capsule 100 mg (has no administration in time range)   hydrocortisone 1 % cream (has no administration in time range)   hydroxychloroquine (PLAQUENIL) tablet 200 mg (has no administration in time range)   lactulose (CHRONULAC) oral solution 20 g (has no administration in time range)   polyethylene glycol (MIRALAX) packet 17 g (has no administration in time range)   sertraline (ZOLOFT) tablet 100 mg (100 mg Oral  Given 7/30/24 1630)   spironolactone (ALDACTONE) tablet 50 mg (50 mg Oral Given 7/30/24 1630)   torsemide (DEMADEX) tablet 20 mg (20 mg Oral Given 7/30/24 1630)   acetaminophen (TYLENOL) tablet 488 mg (488 mg Oral Given 7/30/24 1630)   ondansetron (ZOFRAN) injection 4 mg (has no administration in time range)   enoxaparin (LOVENOX) subcutaneous injection 40 mg (has no administration in time range)   lidocaine (LIDODERM) 5 % patch 1 patch (1 patch Topical Medication Applied 7/30/24 1605)   HYDROmorphone (DILAUDID) injection 1 mg (has no administration in time range)   naloxone (NARCAN) 0.04 mg/mL syringe 0.04 mg (has no administration in time range)   albuterol (PROVENTIL HFA,VENTOLIN HFA) inhaler 2 puff (has no administration in time range)   HYDROmorphone (DILAUDID) injection 0.5 mg (0.5 mg Intravenous Given 7/30/24 1227)   iohexol (OMNIPAQUE) 350 MG/ML injection (MULTI-DOSE) 100 mL (100 mL Intravenous Given 7/30/24 1336)   HYDROmorphone (DILAUDID) injection 0.5 mg (0.5 mg Intravenous Given 7/30/24 1443)   HYDROmorphone (DILAUDID) injection 0.5 mg (0.5 mg Intravenous Given 7/30/24 1653)       Diagnostic Studies  Results Reviewed       Procedure Component Value Units Date/Time    UA w Reflex to Microscopic w Reflex to Culture [[id number]]  (Abnormal) Collected: 07/30/24 1628    Lab Status: Final result Specimen: Urine, Other Updated: 07/30/24 1649     Color, UA Yellow     Clarity, UA Clear     Specific Gravity, UA 1.010     pH, UA 8.5     Leukocytes, UA Negative     Nitrite, UA Negative     Protein, UA Negative mg/dl      Glucose, UA Negative mg/dl      Ketones, UA Negative mg/dl      Urobilinogen, UA 4.0 E.U./dl      Bilirubin, UA Negative     Occult Blood, UA Negative    Comprehensive metabolic panel [[id number]]  (Abnormal) Collected: 07/30/24 1224    Lab Status: Final result Specimen: Blood from Arm, Left Updated: 07/30/24 1257     Sodium 138 mmol/L      Potassium 3.9 mmol/L      Chloride 108 mmol/L      CO2 25  mmol/L      ANION GAP 5 mmol/L      BUN 8 mg/dL      Creatinine 0.75 mg/dL      Glucose 89 mg/dL      Calcium 8.0 mg/dL      Corrected Calcium 8.8 mg/dL      AST 41 U/L      ALT 19 U/L      Alkaline Phosphatase 206 U/L      Total Protein 6.9 g/dL      Albumin 3.0 g/dL      Total Bilirubin 1.22 mg/dL      eGFR 90 ml/min/1.73sq m     Narrative:      [employer] guidelines for Chronic Kidney Disease (CKD):     Stage 1 with normal or high GFR (GFR > 90 mL/min/1.73 square meters)    Stage 2 Mild CKD (GFR = 60-89 mL/min/1.73 square meters)    Stage 3A Moderate CKD (GFR = 45-59 mL/min/1.73 square meters)    Stage 3B Moderate CKD (GFR = 30-44 mL/min/1.73 square meters)    Stage 4 Severe CKD (GFR = 15-29 mL/min/1.73 square meters)    Stage 5 End Stage CKD (GFR <15 mL/min/1.73 square meters)  Note: GFR calculation is accurate only with a steady state creatinine    Lactic acid, plasma (w/reflex if result > 2.0) [[id number]]  (Normal) Collected: 07/30/24 1224    Lab Status: Final result Specimen: Blood from Arm, Left Updated: 07/30/24 1257     LACTIC ACID 0.8 mmol/L     Narrative:      Result may be elevated if tourniquet was used during collection.    CK [[id number]]  (Normal) Collected: 07/30/24 1224    Lab Status: Final result Specimen: Blood from Arm, Left Updated: 07/30/24 1257     Total CK 40 U/L     Ammonia [[id number]]  (Normal) Collected: 07/30/24 1224    Lab Status: Final result Specimen: Blood from Arm, Left Updated: 07/30/24 1256     Ammonia 31 umol/L     Protime-INR [[id number]]  (Abnormal) Collected: 07/30/24 1224    Lab Status: Final result Specimen: Blood from Arm, Left Updated: 07/30/24 1251     Protime 15.9 seconds      INR 1.24    APTT [[id number]]  (Normal) Collected: 07/30/24 1224    Lab Status: Final result Specimen: Blood from Arm, Left Updated: 07/30/24 1251     PTT 30 seconds     CBC and differential [[id number]]  (Abnormal) Collected: 07/30/24 1224    Lab Status: Final result Specimen:  Blood from Arm, Left Updated: 07/30/24 1232     WBC 2.19 Thousand/uL      RBC 3.12 Million/uL      Hemoglobin 10.1 g/dL      Hematocrit 31.0 %      MCV 99 fL      MCH 32.4 pg      MCHC 32.6 g/dL      RDW 14.0 %      MPV 10.0 fL      Platelets 74 Thousands/uL      nRBC 0 /100 WBCs      Segmented % 70 %      Immature Grans % 1 %      Lymphocytes % 20 %      Monocytes % 6 %      Eosinophils Relative 2 %      Basophils Relative 1 %      Absolute Neutrophils 1.54 Thousands/µL      Absolute Immature Grans 0.01 Thousand/uL      Absolute Lymphocytes 0.44 Thousands/µL      Absolute Monocytes 0.13 Thousand/µL      Eosinophils Absolute 0.05 Thousand/µL      Basophils Absolute 0.02 Thousands/µL                    CT abdomen pelvis with contrast   Final Result by Sonu Valencia MD (07/30 9197)      1.  Recent subchondral fracture of the L1 superior endplate with mild loss of height. There is air density which is predominantly centered within the subchondral cleft, consistent with osteonecrosis/Kummel disease.   2.  Recent fracture of the lower sacral segment.   3.  No acute visceral organ injury.   4.  Hepatic cirrhosis, portal hypertension, portosystemic collateral vessels. No ascites.   5.  Bibasilar groundglass opacity and interlobular septal thickening, right greater than left. In the acute setting, diagnostic considerations include pneumonitis and cardiogenic/noncardiogenic edema.   6.  Small right pleural effusion.      Workstation performed: [id number]         XR spine lumbar 2 or 3 views injury    (Results Pending)              Procedures  Procedures         ED Course                                 SBIRT 20yo+      Flowsheet Row Most Recent Value   Initial Alcohol Screen: US AUDIT-C     1. How often do you have a drink containing alcohol? 0 Filed at: 07/30/2024 1204   2. How many drinks containing alcohol do you have on a typical day you are drinking?  0 Filed at: 07/30/2024 1204   3b. FEMALE Any Age, or MALE 65+:  How often do you have 4 or more drinks on one occassion? 0 Filed at: 07/30/2024 1204   Audit-C Score 0 Filed at: 07/30/2024 1204   BERNIE: How many times in the past year have you...    Used an illegal drug or used a prescription medication for non-medical reasons? Never Filed at: 07/30/2024 1204                      Medical Decision Making  Patient presents with low back pain most likely consistent with lumbar strain.  Differential diagnosis includes lumbago versus musculoskeletal spasm/sprain versus sciatica.  No back pain red flags on history or physical.  Presentation not consistent with malignancy, cauda equina syndrome, AAA, viscus perforation, osteomyelitis or epidural abscess, renal colic, pyelonephritis or other infectious process.  CT scan does show evidence of a L1 compression fracture, likely delayed on imaging from previous fall and a distal sacral fracture, given patient's increased pain, inability to ambulate, offered admission for PT OT evaluation and pain control, patient in agreement, hospitalist service accepts    Problems Addressed:  Ambulatory dysfunction: acute illness or injury  Closed compression fracture of body of L1 vertebra (HCC): acute illness or injury  Intractable back pain: acute illness or injury  Sacral fracture (HCC): acute illness or injury    Amount and/or Complexity of Data Reviewed  Labs: ordered. Decision-making details documented in ED Course.  Radiology: ordered. Decision-making details documented in ED Course.    Risk  Prescription drug management.  Decision regarding hospitalization.                 Disposition  Final diagnoses:   Intractable back pain   Closed compression fracture of body of L1 vertebra (HCC)   Sacral fracture (HCC)   Ambulatory dysfunction     Time reflects when diagnosis was documented in both MDM as applicable and the Disposition within this note       Time User Action Codes Description Comment    7/30/2024  3:26 PM Lainey Anderson Add [M54.9]  Intractable back pain     7/30/2024  3:27 PM Lainey Anderson Add [S32.010A] Closed compression fracture of body of L1 vertebra (HCC)     7/30/2024  3:27 PM Lainey Anderson Add [S32.10XA] Sacral fracture (HCC)     7/30/2024  3:27 PM Lainey Anderson Add [R26.2] Ambulatory dysfunction           ED Disposition       ED Disposition   Admit    Condition   Stable    Date/Time   Tue Jul 30, 2024 1526    Comment   Case was discussed with Dr Keyes and the patient's admission status was agreed to be Admission Status: observation status to the service of Dr. Keyes .               Follow-up Information    None         Current Discharge Medication List        CONTINUE these medications which have NOT CHANGED    Details   amitriptyline (ELAVIL) 75 mg tablet Take 1 tablet (75 mg total) by mouth daily at bedtime  Qty: 30 tablet, Refills: 0    Associated Diagnoses: Acute on chronic diastolic heart failure (HCC)      Cholecalciferol (Vitamin D-3) 125 MCG (5000 UT) TABS Take 1 tablet by mouth once a week Tuesday      dapsone 25 mg tablet Take 2 tablets (50 mg total) by mouth daily  Qty: 60 tablet, Refills: 0    Associated Diagnoses: Sarcoidosis      folic acid (FOLVITE) 1 mg tablet Take 1 mg by mouth daily      gabapentin (NEURONTIN) 100 mg capsule Take 1 capsule (100 mg total) by mouth 2 (two) times a day  Qty: 60 capsule, Refills: 0    Associated Diagnoses: Sarcoidosis      hydroxychloroquine (PLAQUENIL) 200 mg tablet Take 200 mg by mouth 2 (two) times a day with meals      lactulose (CHRONULAC) 10 g/15 mL solution Take 30 mL (20 g total) by mouth 4 (four) times a day Goal bowel movements is 4 a day  Qty: 3600 mL, Refills: 0    Associated Diagnoses: Hepatic encephalopathy (HCC)      polyethylene glycol (MIRALAX) 17 g packet Take 17 g by mouth if needed (constipation) for up to 10 days  Qty: 10 each, Refills: 0    Associated Diagnoses: Hepatic encephalopathy (HCC)      sertraline (ZOLOFT) 100 mg tablet Take 1 tablet (100 mg total) by  mouth daily  Qty: 30 tablet, Refills: 0    Associated Diagnoses: Sarcoidosis      spironolactone (ALDACTONE) 50 mg tablet Take 1 tablet (50 mg total) by mouth daily  Qty: 30 tablet, Refills: 0    Associated Diagnoses: CHF (congestive heart failure) (HCC)      torsemide (DEMADEX) 20 mg tablet Take 1 tablet (20 mg total) by mouth 2 (two) times a day before meals  Qty: 60 tablet, Refills: 0    Associated Diagnoses: CHF (congestive heart failure) (HCC)      hydrocortisone 1 % cream Apply topically 2 (two) times a day  Qty: 28 g, Refills: 0    Associated Diagnoses: Sarcoidosis             No discharge procedures on file.    PDMP Review         Value Time User    PDMP Reviewed  Yes 7/30/2024  3:35 PM Ruslan Keyes MD            ED Provider  Electronically Signed by             Lainey Anderson DO  07/30/24 6365

## 2024-07-30 NOTE — ASSESSMENT & PLAN NOTE
Imagin.  Recent subchondral fracture of the L1 superior endplate with mild loss of height. There is air density which is predominantly centered within the subchondral cleft, consistent with osteonecrosis/Kummel disease.   2.  Recent fracture of the lower sacral segment.     Ambulatory dysfunction due to intractable pain secondary to L1 superior endplate and lower sacral segment fracture  Continue pain management, PT OT, neurovascular checks.

## 2024-07-30 NOTE — CONSULTS
"e-Consult (IPC)     Inpatient consult to Neurosurgery  Consult performed by: MAGDA Montilla  Consult ordered by: MD Bailey Farias 53 y.o. female MRN: [id number]  Unit/Bed#: MS [facility] Encounter: [id number]    Reason for Consult    Per provider report, patient presents to [facility] ED on 7/30 with complaint of intractable back pain. She sustained a mechanical fall on 7/18 and was evaluated in the ED without evidence of acute injury. She was admitted for one night for observation secondary to intractable pain.She re-presents today with similar complaints. Workup was notable for subchondral L1 fracture on CT AP. For this reason, neurosurgery has been consulted.    Available past medical history,social history, surgical history, medication list, drug allergies and review of systems were reviewed.    /84   Pulse 91   Temp 98.3 °F (36.8 °C) (Temporal)   Resp 14   Ht 5' 5\" (1.651 m)   Wt 125 kg (276 lb 0.3 oz)   SpO2 92%   BMI 45.93 kg/m²      Clinical exam per provider report, tender to low back. No motor weakness or sensory changes.    Imaging personally reviewed. L1 SEP fracture with vacuum phenomenon.      Assessment and Recommendations    1. Continue to monitor neuro exam closely.  2. TLSO brace when upright > 45 degrees and OOB - may be difficult due to body habitus.  3. Upright lumbar x-rays when able.  4. Pain control per primary team.  5. Mobilize as tolerated with PT/OT.  6. DVT ppx: SCDs.  7. Do not anticipate surgical intervention.  8. No need for transfer.  9. Will review upright imaging once completed. Call with questions.    All questions answered. Provider is in agreement with the course of action. 11-20 minutes, >50% of the total time devoted to medical consultative verbal/EMR discussion between providers. Written report will be generated in the EMR.    "

## 2024-07-30 NOTE — RESPIRATORY THERAPY NOTE
RT Protocol Note  Bailey Olsen 53 y.o. female MRN: [id number]  Unit/Bed#: -01 Encounter: [id number]    Assessment    Principal Problem:    Intractable back pain  Active Problems:    Other cirrhosis of liver (HCC)    History of malignant carcinoid tumor of small intestine    Class 3 severe obesity due to excess calories in adult (HCC)    Sarcoidosis    Chronic diastolic congestive heart failure (HCC)    Ambulatory dysfunction      Home Pulmonary Medications:         Past Medical History:   Diagnosis Date    Carcinoid tumor     neuroendocrine    CHF (congestive heart failure) (HCC)     Cirrhosis of liver (HCC)      Social History     Socioeconomic History    Marital status: /Civil Union     Spouse name: None    Number of children: None    Years of education: None    Highest education level: None   Occupational History    None   Tobacco Use    Smoking status: Never    Smokeless tobacco: Never   Vaping Use    Vaping status: Never Used   Substance and Sexual Activity    Alcohol use: Never    Drug use: Never    Sexual activity: Not Currently   Other Topics Concern    None   Social History Narrative    None     Social Determinants of Health     Financial Resource Strain: Medium Risk (1/4/2024)    Received from [facility]    Overall Financial Resource Strain (CARDIA)     Difficulty of Paying Living Expenses: Somewhat hard   Food Insecurity: No Food Insecurity (7/19/2024)    Hunger Vital Sign     Worried About Running Out of Food in the Last Year: Never true     Ran Out of Food in the Last Year: Never true   Transportation Needs: No Transportation Needs (7/19/2024)    PRAPARE - Transportation     Lack of Transportation (Medical): No     Lack of Transportation (Non-Medical): No   Physical Activity: Not on file   Stress: Stress Concern Present (1/4/2024)    Received from [facility]    [employer] - Occupational Stress Questionnaire     Feeling of  "Stress : Very much   Social Connections: Unknown (6/18/2024)    Received from [employer]     How often do you feel lonely or isolated from those around you? (Adult - for ages 18 years and over): Not on file   Intimate Partner Violence: Not At Risk (1/4/2024)    Received from [facility]    Humiliation, Afraid, Rape, and Kick questionnaire     Fear of Current or Ex-Partner: No     Emotionally Abused: No     Physically Abused: No     Sexually Abused: No   Housing Stability: Low Risk  (7/19/2024)    Housing Stability Vital Sign     Unable to Pay for Housing in the Last Year: No     Number of Times Moved in the Last Year: 0     Homeless in the Last Year: No       Subjective         Objective    Physical Exam:   Assessment Type: Assess only  General Appearance: Alert  Chest Assessment: Chest expansion symmetrical  Cough: (P) None  O2 Device: RA    Vitals:  Blood pressure 144/84, pulse 91, temperature 98.3 °F (36.8 °C), temperature source Temporal, resp. rate 14, height 5' 5\" (1.651 m), weight 125 kg (276 lb 0.3 oz), SpO2 98%.          Imaging and other studies: I have personally reviewed pertinent reports.      O2 Device: RA     Plan    Respiratory Plan: Home Bronchodilator Patient pathway        Resp Comments: (P) Pt admitted with intractable back pain due to L1 fracture. Pt assessed on RA, in no distress. States she has an albuterol inhaler which she can use up to 3-4 times a day for SOB.  Pt hx CHF, sarcoidosis, which she states has not affected her lungs. No history of asthma and non smoker. PRN Albuterol inhaler ordered.   "

## 2024-07-31 ENCOUNTER — APPOINTMENT (OUTPATIENT)
Dept: RADIOLOGY | Facility: HOSPITAL | Age: 54
DRG: 552 | End: 2024-07-31
Payer: COMMERCIAL

## 2024-07-31 ENCOUNTER — TELEPHONE (OUTPATIENT)
Dept: NEUROSURGERY | Facility: CLINIC | Age: 54
End: 2024-07-31

## 2024-07-31 PROCEDURE — 97116 GAIT TRAINING THERAPY: CPT

## 2024-07-31 PROCEDURE — 97163 PT EVAL HIGH COMPLEX 45 MIN: CPT

## 2024-07-31 PROCEDURE — 97760 ORTHOTIC MGMT&TRAING 1ST ENC: CPT

## 2024-07-31 PROCEDURE — 72100 X-RAY EXAM L-S SPINE 2/3 VWS: CPT

## 2024-07-31 PROCEDURE — 97167 OT EVAL HIGH COMPLEX 60 MIN: CPT

## 2024-07-31 PROCEDURE — 99232 SBSQ HOSP IP/OBS MODERATE 35: CPT | Performed by: FAMILY MEDICINE

## 2024-07-31 RX ORDER — GABAPENTIN 100 MG/1
200 CAPSULE ORAL 3 TIMES DAILY
Status: DISCONTINUED | OUTPATIENT
Start: 2024-07-31 | End: 2024-08-03 | Stop reason: HOSPADM

## 2024-07-31 RX ORDER — OXYCODONE HYDROCHLORIDE 5 MG/1
5 TABLET ORAL EVERY 4 HOURS PRN
Status: DISCONTINUED | OUTPATIENT
Start: 2024-07-31 | End: 2024-08-03 | Stop reason: HOSPADM

## 2024-07-31 RX ADMIN — GABAPENTIN 200 MG: 100 CAPSULE ORAL at 16:33

## 2024-07-31 RX ADMIN — HYDROXYCHLOROQUINE SULFATE 200 MG: 200 TABLET, FILM COATED ORAL at 07:58

## 2024-07-31 RX ADMIN — GABAPENTIN 200 MG: 100 CAPSULE ORAL at 21:04

## 2024-07-31 RX ADMIN — HYDROMORPHONE HYDROCHLORIDE 1 MG: 1 INJECTION, SOLUTION INTRAMUSCULAR; INTRAVENOUS; SUBCUTANEOUS at 16:34

## 2024-07-31 RX ADMIN — ACETAMINOPHEN 325MG 488 MG: 325 TABLET ORAL at 02:23

## 2024-07-31 RX ADMIN — ENOXAPARIN SODIUM 40 MG: 40 INJECTION SUBCUTANEOUS at 06:05

## 2024-07-31 RX ADMIN — LACTULOSE 20 G: 10 SOLUTION ORAL at 16:34

## 2024-07-31 RX ADMIN — HYDROXYCHLOROQUINE SULFATE 200 MG: 200 TABLET, FILM COATED ORAL at 18:12

## 2024-07-31 RX ADMIN — ONDANSETRON 4 MG: 2 INJECTION INTRAMUSCULAR; INTRAVENOUS at 18:13

## 2024-07-31 RX ADMIN — ENOXAPARIN SODIUM 40 MG: 40 INJECTION SUBCUTANEOUS at 16:34

## 2024-07-31 RX ADMIN — ONDANSETRON 4 MG: 2 INJECTION INTRAMUSCULAR; INTRAVENOUS at 08:07

## 2024-07-31 RX ADMIN — DAPSONE 50 MG: 25 TABLET ORAL at 08:11

## 2024-07-31 RX ADMIN — AMITRIPTYLINE HYDROCHLORIDE 75 MG: 25 TABLET, FILM COATED ORAL at 21:03

## 2024-07-31 RX ADMIN — GABAPENTIN 100 MG: 100 CAPSULE ORAL at 07:57

## 2024-07-31 RX ADMIN — HYDROMORPHONE HYDROCHLORIDE 1 MG: 1 INJECTION, SOLUTION INTRAMUSCULAR; INTRAVENOUS; SUBCUTANEOUS at 08:01

## 2024-07-31 RX ADMIN — SPIRONOLACTONE 50 MG: 25 TABLET ORAL at 07:58

## 2024-07-31 RX ADMIN — LACTULOSE 20 G: 10 SOLUTION ORAL at 21:03

## 2024-07-31 RX ADMIN — TORSEMIDE 20 MG: 20 TABLET ORAL at 16:33

## 2024-07-31 RX ADMIN — FOLIC ACID 1 MG: 1 TABLET ORAL at 07:57

## 2024-07-31 RX ADMIN — SERTRALINE HYDROCHLORIDE 100 MG: 25 TABLET ORAL at 07:57

## 2024-07-31 RX ADMIN — TORSEMIDE 20 MG: 20 TABLET ORAL at 06:07

## 2024-07-31 RX ADMIN — LACTULOSE 20 G: 10 SOLUTION ORAL at 12:01

## 2024-07-31 RX ADMIN — HYDROMORPHONE HYDROCHLORIDE 1 MG: 1 INJECTION, SOLUTION INTRAMUSCULAR; INTRAVENOUS; SUBCUTANEOUS at 12:01

## 2024-07-31 RX ADMIN — LACTULOSE 20 G: 10 SOLUTION ORAL at 07:58

## 2024-07-31 NOTE — OCCUPATIONAL THERAPY NOTE
Occupational Therapy Evaluation     Patient Name: Bailey Olsen  Today's Date: 7/31/2024  Problem List  Principal Problem:    Intractable back pain  Active Problems:    Other cirrhosis of liver (HCC)    History of malignant carcinoid tumor of small intestine    Class 3 severe obesity due to excess calories in adult (HCC)    Sarcoidosis    Chronic diastolic congestive heart failure (HCC)    Ambulatory dysfunction    Past Medical History  Past Medical History:   Diagnosis Date    Carcinoid tumor     neuroendocrine    CHF (congestive heart failure) (HCC)     Cirrhosis of liver (HCC)      Past Surgical History  History reviewed. No pertinent surgical history.        07/31/24 0955   Note Type   Note type Evaluation   Pain Assessment   Pain Assessment Tool 0-10   Pain Score 5   Pain Location/Orientation Orientation: Left;Orientation: Lower;Location: Back;Location: Abdomen;Location: Foot   Pain Radiating Towards Down left leg and into left side of abdomen   Restrictions/Precautions   Braces or Orthoses TLSO  (when OOB and HOB > 45 degrees)   Other Precautions Chair Alarm;Bed Alarm;Fall Risk;Pain;Spinal precautions   Home Living   Type of Home House  (1 FABRICIO)   Home Layout Two level;Performs ADLs on one level;Able to live on main level with bedroom/bathroom  (Full flight of stairs R HR)   Bathroom Shower/Tub Tub/shower unit   Bathroom Toilet Standard   Bathroom Equipment Grab bars in shower;Grab bars around toilet;Commode   Home Equipment Walker;Other (Comment)  (Rollator)   Additional Comments Pt reports living in a 2SH with 1 FABRICIO and first floor setup, was using RW for functional mobility PTA PRN.   Prior Function   Level of [place] Independent with ADLs;Independent with functional mobility;Independent with IADLS   Lives With Spouse   Receives Help From Family   IADLs Independent with meal prep;Independent with medication management;Family/Friend/Other provides transportation   Falls in the last 6 months (S)  5 to 10    Comments PTA, pt reports independence with ADLs, functional mobility, and most IADLs- has assistance for transportation.   ADL   UB Dressing Assistance 5  Supervision/Setup   UB Dressing Deficit Increased time to complete;Verbal cueing;Supervision/safety   LB Dressing Assistance 2  Maximal Assistance   LB Dressing Deficit Don/doff R sock;Don/doff L sock   Additional Comments Due to spinal precautions, TLSO brace/body habitus, anticipate for LB ADLs to require maximal assistance at this time. UB ADLs can functionally be completed with setup and increased time.   Bed Mobility   Rolling L 4  Minimal assistance   Additional items Assist x 1;Increased time required;Verbal cues   Supine to Sit 4  Minimal assistance  (HOB flat, no bedrails)   Additional items Assist x 1;Increased time required;Verbal cues;Other;Comment  (Trunk management)   Additional Comments Pt received supine in bed upon start of session. Log rolling technique utilized in order to minimize pain. Pt rolling to her L with min assist x 1, and supine > sit EOB with min assist x 1 for trunk management and VCs for technique. Pt requiring assistance to don TLSO upon sitting EOB.   Transfers   Sit to Stand   (Steadying assistance-->SBA)   Additional items Assist x 1;Increased time required;Verbal cues   Stand to Sit   (Steadying assistance-->SBA)   Additional items Assist x 1;Increased time required;Verbal cues   Stand pivot   (Steadying assistance-->SBA)   Additional items Assist x 1;Increased time required;Verbal cues   Additional Comments All functional transfers with RW, VCs for safe transfer techniques. Pt initially completing functional transfers with steadying assistance and progresses to SBA with increased time.   Functional Mobility   Functional Mobility   (Steadying assistance-->SBA)   Additional Comments Pt participated in functional household distance in room initially with steadying assistance and RW, later progressing to SBA.   Balance   Static  Sitting Fair +   Dynamic Sitting Fair   Static Standing Fair   Dynamic Standing Fair -   Activity Tolerance   Activity Tolerance Patient limited by fatigue;Patient limited by pain   Medical Staff Made Aware PTDianna and PCA, Saia   Nurse Made Aware RNAshlyn Assessment   RUE Assessment WFL  (Decreased shoulder AROM however pt seems to be guarded due to back pain and is still grossly functional)   LUE Assessment   LUE Assessment WFL  (Decreased shoulder AROM however pt seems to be guarded due to back pain and is still grossly functional)   Hand Function   Gross Motor Coordination Functional   Fine Motor Coordination Functional   Hand Function Comments Pt is R hand dominant.   Sensation   Light Touch No apparent deficits   Cognition   Overall Cognitive Status WFL   Arousal/Participation Alert;Cooperative   Attention Within functional limits   Orientation Level Oriented X4   Memory Within functional limits   Following Commands Follows one step commands without difficulty   Assessment   Limitation Decreased ADL status;Decreased UE ROM;Decreased UE strength;Decreased Safe judgement during ADL;Decreased endurance;Decreased self-care trans;Decreased high-level ADLs   Prognosis Fair   Assessment Pt is a 53 y.o. female, admitted to [facility] 7/30/2024 d/t experiencing back pain. Dx: Intractable back pain. Pt with PMHx impacting their performance during ADL tasks, including: ambulatory dysfunction, recent subchondral fracture of L1 superior endplate with mild loss of height, recent fracture of lower sacral segment, chronic diastolic heart failure, sarcoidosis, obesity, malignant carcinoid tumor of small intestine, cirrhosis of liver, CHF. Prior to admission to the hospital Pt was performing ADLs without physical assistance. Most IADLs without physical assistance. Functional transfers/ambulation without physical assistance. Cognitive status PTA was WFL. OT order placed to assess Pt's ADLs, cognitive status, and performance  during functional tasks in order to maximize safety and independence while making most appropriate d/c recommendations. PT/OT co-evaluation completed at this time d/t significant mobility deficits and safety concerns. Pt's clinical presentation is currently unstable/unpredictable given new onset deficits that affect Pt's occupational performance and ability to safely return to PLOF including decrease activity tolerance, decrease sitting balance, decrease performance during ADL tasks, increased pain, decrease activity engagement, decrease performance during functional transfers, limited family support, frequent falls, high fall risk, and limited insight to deficits combined with medical complications of pain impacting overall mobility status, abnormal H&H, abnormal CBC, fear/retreat, and need for input for mobility technique/safety. Personal factors affecting Pt at time of initial evaluation include: multi-level environment, limited home support, inability to perform IADLs, inability to perform ADLs, inability to navigate community distances, limited insight into impairments, decreased initiation and engagement, and recent fall(s)/fall history. Pt will benefit from continued skilled OT services to address deficits as defined above and to maximize level independence/participation during ADLs and functional tasks to facilitate return toward PLOF and improved quality of life. From an occupational therapy standpoint, Level I (Maximum Resource Intensity) is recommended upon d/c.   Goals   Patient Goals to go home   Plan   Treatment Interventions ADL retraining;Functional transfer training;UE strengthening/ROM;Endurance training;Patient/family training;Equipment evaluation/education;Compensatory technique education;Continued evaluation;Energy conservation;Activityengagement   Goal Expiration Date 08/14/24   OT Frequency 3-5x/wk   Discharge Recommendation   Rehab Resource Intensity Level, OT I (Maximum Resource  Intensity)  (Acute)   AM-[facility] Daily Activity Inpatient   Lower Body Dressing 2   Bathing 2   Toileting 2   Upper Body Dressing 3   Grooming 3   Eating 4   Daily Activity Raw Score 16   Daily Activity Standardized Score (Calc for Raw Score >=11) 35.96   -PAC Applied Cognition Inpatient   Following a Speech/Presentation 3   Understanding Ordinary Conversation 4   Taking Medications 3   Remembering Where Things Are Placed or Put Away 3   Remembering List of 4-5 Errands 3   Taking Care of Complicated Tasks 2   Applied Cognition Raw Score 18   Applied Cognition Standardized Score 38.07   End of Consult   Patient Position at End of Consult Other (comment)  (Seated in W/C, with PCA (pt to receive upright XRs))     The patient's raw score on the AM-PAC Daily Activity Inpatient Short Form is 16. A raw score of less than 19 suggests the patient may benefit from discharge to post-acute rehabilitation services. Please refer to the recommendation of the Occupational Therapist for safe discharge planning.    Pt goals to be met by 8/14/2024:    Pt will demonstrate ability to complete grooming/hygiene tasks @ mod I after set-up.  Pt will demonstrate ability to complete supine<>sit @ mod I in order to increase safety and independence during ADL tasks.  Pt will demonstrate ability to complete UB ADLs including washing/dressing @ mod I in order to increase performance and participation during meaningful tasks  Pt will demonstrate ability to complete LB dressing @ mod I in order to increase safety and independence during meaningful tasks.   Pt will demonstrate ability to iain/doff socks/shoes while sitting EOB/chair @ mod I in order to increase safety and independence during meaningful tasks.   Pt will demonstrate ability to complete toileting tasks including CM and pericare @ mod I in order to increase safety and independence during meaningful tasks.  Pt will demonstrate ability to complete EOB, chair, toilet/commode transfers @ mod  I in order to increase performance and participation during functional tasks.  Pt will demonstrate ability to stand for 10 minutes while maintaining F+ balance with use of RW for UB support PRN.  Pt will demonstrate ability to tolerate 30 minute OT session with no vc'ing for deep breathing or use of energy conservation techniques in order to increase activity tolerance during functional tasks.   Pt will demonstrate Good carryover of use of energy conservation/compensatory strategies during ADLs and functional tasks in order to increase safety and reduce risk for falls.   Pt will demonstrate Good attention and participation in continued evaluation of functional ambulation house hold distances in order to assist with safe d/c planning.  Pt will attend to continued cognitive assessments 100% of the time in order to provide most appropriate d/c recommendations.   Pt will follow 100% simple 2-step commands and be A&O x4 consistently with environmental cues to increase participation in functional activities.   Pt will identify 3 areas of interest/hobbies and 1 intervention on how to incorporate into daily life in order to increase interaction with environment and peers as well as increase participation in meaningful tasks.   Pt will demonstrate 100% carryover of BUE HEP in order to increase BUE MS and increase performance during functional tasks upon d/c home.    Ryan Farah, MS, OTR/L

## 2024-07-31 NOTE — TELEPHONE ENCOUNTER
8/6/24 - CALLED AND LMOM CONFIRMING APT IN [place] W/XR NEEDED PRIOR. MENTIONED THAT XR ARE A WALK IN SERVICE    8/5/24 - PT DISCHARGED TO HOME  8/19/24 2 WK HFU W/LSPINE XR (TELECONSULTED)    8/2/24 - PT STILL IN HOSPITAL    7/31/24 - PT IN [facility]  8/19/24 2 WK HFU W/LSPINE XR (TELECONSULTED)    [facility]  Hello, patient needs hospital follow up in 2 weeks with AP. I've ordered lumbar x-rays. Thanks.

## 2024-07-31 NOTE — ASSESSMENT & PLAN NOTE
Wt Readings from Last 3 Encounters:   07/31/24 124 kg (273 lb 13 oz)   07/18/24 121 kg (266 lb)   07/08/24 121 kg (266 lb 12.8 oz)     Not in acute exacerbation,  Continue home medication, low-salt diet, fluid restriction , intake and output  Continue torsemide  2D echo done 05/2024 was normal EF.

## 2024-07-31 NOTE — ASSESSMENT & PLAN NOTE
Imagin.  Recent subchondral fracture of the L1 superior endplate with mild loss of height. There is air density which is predominantly centered within the subchondral cleft, consistent with osteonecrosis/Kummel disease.   2.  Recent fracture of the lower sacral segment.     Ambulatory dysfunction due to intractable pain secondary to L1 superior endplate and lower sacral segment fracture  Continue pain management,   PT OT recommending acute rehab placement-due to high risk for fall and significant pain patient needs supervised physical therapy.  Patient had recent history of fall about 2 weeks ago and recently got discharged from this hospital.

## 2024-07-31 NOTE — TREATMENT PLAN
Lumbar x-rays reviewed. Somewhat further loss of height to SEP of L1 vertebra, overall stable.    Continue TLSO brace as previously instructed. Will plan for outpatient follow up in 2 weeks with upright x-rays.     Neurosurgery will sign off. Call with questions.

## 2024-07-31 NOTE — CASE MANAGEMENT
Case Management Assessment & Discharge Planning Note    Patient name Bailey Olsen  Location /-01 MRN [id number]  : 1970 Date 2024       Current Admission Date: 2024  Current Admission Diagnosis:Intractable back pain   Patient Active Problem List    Diagnosis Date Noted Date Diagnosed    Ambulatory dysfunction 2024     Intractable back pain 2024     Hyperammonemia (HCC) 2024     Hepatic encephalopathy (HCC) 2024     Fall 2024     Other cirrhosis of liver (HCC) 2024     History of malignant carcinoid tumor of small intestine 2024     Class 3 severe obesity due to excess calories in adult (HCC) 2024     Primary hypertension 2024     Anxiety 2024     Hypokalemia 2024     Fibromyalgia 2024     Iron deficiency anemia 2024     Pancytopenia (HCC) 2024     Sarcoidosis 2024     Chronic diastolic congestive heart failure (HCC) 2024     Bilateral pulmonary infiltrates 2024       LOS (days): 0  Geometric Mean LOS (GMLOS) (days):   Days to GMLOS:     OBJECTIVE:              Current admission status: Observation  Referral Reason: Other (Post Acute Placement /  Post Acute Home Needs (VNA/DME/Infusion)  Medications)    Preferred Pharmacy:   [facility] [address] [place] [address] [address] [facility]  [address] [facility] [address]  Phone: [phone] Fax: [phone]    Primary Care Provider: Nayely Brown DO    Primary Insurance: [facility]  Secondary Insurance:     ASSESSMENT:  Active Health Care Proxies       Cristian Olsen Health Care Representative - Spouse   Primary Phone: [phone] (Mobile)                 Advance Directives  Does patient have a Health Care POA?: No  Was patient offered paperwork?: Yes (Per patient has the paperwork for POA and living will just hasn't completed yet.)  Does patient currently have a Health Care decision maker?: Yes, please see  Health Care Proxy section (Cristian Olsen- spouse)  Does patient have Advance Directives?: No  Was patient offered paperwork?: Yes (Per patient has the paperwork for POA and living will just hasn't completed yet.)  Primary Contact: Cristian Olsen- spouse         Readmission Root Cause  30 Day Readmission: No    Patient Information  Admitted from:: Home  Mental Status: Alert  During Assessment patient was accompanied by: Not accompanied during assessment  Assessment information provided by:: Patient  Primary Caregiver: Spouse  Caregiver's Name:: Cristian Olsen- spouse  Caregiver's Relationship to Patient:: Significant Other  Caregiver's Telephone Number:: [phone]  Support Systems: Spouse/significant other, Daughter, Family members  County of Residence: [facility]  [facility] do you live in?: [place]  Home entry access options. Select all that apply.: Stairs  Number of steps to enter home.: 1  Do the steps have railings?: Yes  Type of Current Residence: [address] [place] [address] home  Upon entering residence, is there a bedroom on the main floor (no further steps)?: Yes  Upon entering residence, is there a bathroom on the main floor (no further steps)?: Yes  Living Arrangements: Lives w/ Spouse/significant other  Is patient a ?: No    Activities of Daily Living Prior to Admission  Functional Status: Assistance  Completes ADLs independently?: No  Level of ADL dependence: Assistance  Ambulates independently?: No  Level of ambulatory dependence: Assistance  Does patient use assisted devices?: Yes  Assisted Devices (DME) used: Bedside Commode, Walker  Does patient currently own DME?: Yes  What DME does the patient currently own?: Walker, Bedside Commode  Does patient have a history of Outpatient Therapy (PT/OT)?: Yes ([facility])  Does the patient have a history of Short-Term Rehab?: Yes ([facility])  Does patient have a history of HHC?: Yes ([facility])  Does patient currently have HHC?: No         Patient  Information Continued  Income Source: SSI/SSD  Does patient have prescription coverage?: Yes  Does patient receive dialysis treatments?: No  Does patient have a history of substance abuse?: No  Does patient have a history of Mental Health Diagnosis?: No         Means of Transportation  Means of Transport to Appts:: Family transport      Social Determinants of Health (SDOH)      Flowsheet Row Most Recent Value   Housing Stability    In the last 12 months, was there a time when you were not able to pay the mortgage or rent on time? N   In the past 12 months, how many times have you moved where you were living? 0   At any time in the past 12 months, were you homeless or living in a shelter (including now)? N   Transportation Needs    In the past 12 months, has lack of transportation kept you from medical appointments or from getting medications? no   In the past 12 months, has lack of transportation kept you from meetings, work, or from getting things needed for daily living? No   Food Insecurity    Within the past 12 months, you worried that your food would run out before you got the money to buy more. Never true   Within the past 12 months, the food you bought just didn't last and you didn't have money to get more. Never true   Utilities    In the past 12 months has the electric, gas, oil, or water company threatened to shut off services in your home? No            DISCHARGE DETAILS:    Discharge planning discussed with:: Patient  Freedom of Choice: Yes  Comments - Freedom of Choice: patient agreeable to blanket referral for acute rehab  CM contacted family/caregiver?: Yes (Cristian Olsen- spouse)  Were Treatment Team discharge recommendations reviewed with patient/caregiver?: Yes  Did patient/caregiver verbalize understanding of patient care needs?: Yes  Were patient/caregiver advised of the risks associated with not following Treatment Team discharge recommendations?: Yes    Contacts  Patient Contacts: Cristian Olsen-  spouse  Relationship to Patient:: Family  Contact Method: Phone  Phone Number: [phone]  Reason/Outcome: Continuity of Care, Discharge Planning              Other Referral/Resources/Interventions Provided:  Interventions: Acute Rehab    Would you like to participate in our [facility] service program?  : No - Declined    Treatment Team Recommendation: Acute Rehab  Discharge Destination Plan:: Acute Rehab             CM met with patient and spoke with spouse Cristian Olsen via phone to review role of CM and discuss any supports needed upon discharge. Baseline information obtained from patient, she requires max assist with ambulation utilizing a walker due to increased back pain. Spouse assist with ADLs. Care team recommendations for acute rehab discussed with patient, she is agreeable to acute rehab. [place] referral placed in [facility] for acute rehab.

## 2024-07-31 NOTE — PHYSICAL THERAPY NOTE
PHYSICAL THERAPY EVALUATION  NAME:  Bailey Olsen  DATE: 07/31/24    AGE:   53 y.o.  Mrn:   [id number]  ADMIT DX:  Back pain [M54.9]  Sacral fracture (HCC) [S32.10XA]  Intractable back pain [M54.9]  Ambulatory dysfunction [R26.2]  Closed compression fracture of body of L1 vertebra (HCC) [S32.010A]    Past Medical History:   Diagnosis Date    Carcinoid tumor     neuroendocrine    CHF (congestive heart failure) (HCC)     Cirrhosis of liver (HCC)      Length Of Stay: 0  Performed at least 2 patient identifiers during session: Name and Birthday  PHYSICAL THERAPY EVALUATION :    07/31/24 0912   PT Last Visit   PT Visit Date 07/31/24   Note Type   Note type Evaluation;Orthotic Management/Training   Type of Brace   Brace Applied [place] TLSO  ([place] 456 TLSO at XX-Large bilaterally)   Additional Brace Ordered No   Patient Position When Brace Applied Seated   Education   Education Provided Yes   End of Consult   Patient Position at End of Consult   (sitting in wheelchair at end of session for uprihgt xrays)   Nurse Communication Nurse aware of consult, application of brace   Pain Assessment   Pain Assessment Tool 0-10   Pain Score 5   Pain Location/Orientation Orientation: Left;Orientation: Lower;Location: Back;Location: Abdomen;Location: Foot   Pain Radiating Towards down left leg and into left side of abdomen   Restrictions/Precautions   Braces or Orthoses TLSO  (when OOB and HOB > 45 degrees)   Other Precautions Chair Alarm;Bed Alarm;Fall Risk;Pain   Home Living   Type of Home House  (1 FABRICIO)   Home Layout Two level;Performs ADLs on one level;Able to live on main level with bedroom/bathroom  (FF R HR if going upstairs, but doesn't have to)   Bathroom Shower/Tub Tub/shower unit   Bathroom Toilet Standard   Bathroom Equipment Grab bars in shower;Grab bars around toilet   Home Equipment Walker  (rollator-wasn't using an AD PTA but if needed to was using RW.)   Additional Comments Reports living in a 2SH with 1 FABRICIO  "and 1st floor set up. Doesn't use an AD PTA, but will use RW prn.   Prior Function   Level of [place] Independent with ADLs;Independent with functional mobility;Independent with IADLS   Lives With Spouse   Receives Help From Family   IADLs Independent with meal prep;Independent with medication management;Family/Friend/Other provides transportation   Falls in the last 6 months (S)  5 to 10   Comments Reports being independent with mobility, ADLs and IADLs but has assistance for community mobility   General   Additional Pertinent History CT abdomen and pelvis: 1.  Recent subchondral fracture of the L1 superior endplate with mild loss of height. There is air density which is predominantly centered within the subchondral cleft, consistent with osteonecrosis/Kummel disease.  2.  Recent fracture of the lower sacral segment.  3.  No acute visceral organ injury.  4.  Hepatic cirrhosis, portal hypertension, portosystemic collateral vessels. No ascites.  5.  Bibasilar groundglass opacity and interlobular septal thickening, right greater than left. In the acute setting, diagnostic considerations include pneumonitis and cardiogenic/noncardiogenic edema.  6.  Small right pleural effusion.   Cognition   Orientation Level Oriented X4   Following Commands Follows one step commands without difficulty   Subjective   Subjective \"I fell once at home since I went home last time.\"   RLE Assessment   RLE Assessment WFL  (supine AROM WFL, seated EOB, hip flexion < 90 degrees, inc pain strength 3+/5 knee and ankle and 2/5 hip)   LLE Assessment   LLE Assessment WFL  (supine AROM WFL, seated EOB, hip flexion < 90 degrees, inc pain strength 3-/5 knee and ankle and 2/5 hip)   Coordination   Rapid Alternating Movements Intact   Light Touch   RLE Light Touch Impaired   RLE Light Touch Comments diminished distally   LLE Light Touch Impaired   LLE Light Touch Comments diminsihed diatlly   Bed Mobility   Rolling L 4  Minimal assistance "   Additional items Assist x 1;Increased time required;Verbal cues   Supine to Sit 4  Minimal assistance   Additional items Assist x 1;Increased time required;Verbal cues  (trunk management)   Additional Comments HOB flat without bedrail. inc time to complete with verbal cues for sequence and technique. pt educated on log rolling technque and spinal precautions prior to trial. required minAx1 to complete bed mobility. Educated patient on TLSO brace. Verbal and visual instruction provided on donning and doffing. verbal instruction on when to wear and where to don/doff. Pt verbalized understanding. pt fitted for TLSO brace. She requires moderate assistance to don at this time with cues for technique and sequence. pt with inc pain low back.   Transfers   Sit to Stand   (steadying assistance)   Additional items Assist x 1;Increased time required;Verbal cues   Stand to Sit   (steadying assistance)   Additional items Assist x 1;Increased time required;Verbal cues   Stand pivot   (steadying assistance)   Additional items Assist x 1;Increased time required;Verbal cues   Additional Comments RW. pt pulling from RW despite mod verbal cues. inc time and effort to achieve standing. steadying assistance for balance and safety. spt wiht steadying assistance with wide DENICE, inc time to complete and min cues for turnign completely.   Ambulation/Elevation   Gait pattern Wide DENICE;Improper Weight shift;Decreased foot clearance;Short stride;Excessively slow   Gait Assistance   (minimal to steadying)   Additional items Verbal cues   Assistive Device Rolling walker   Distance ambulated 26'x1 with RW with minimal to steadying assistance with wide DENICE, decreased step lenght, foot clearance, cues to stay wihtin DENICE of RW and inc step length.   Balance   Static Sitting Fair +   Dynamic Sitting Fair   Static Standing Fair   Dynamic Standing Fair -   Ambulatory Poor +   Activity Tolerance   Activity Tolerance Patient limited by fatigue;Patient  "limited by pain   Medical Staff Made Aware Pat DAVALOS   Nurse Made Aware RNAshlyn   Assessment   Prognosis Good   Problem List Decreased strength;Impaired balance;Decreased endurance;Decreased mobility;Decreased safety awareness;Impaired sensation;Obesity;Pain   Barriers to Discharge Decreased caregiver support;Inaccessible home environment   Barriers to Discharge Comments requires assistance to complete mobility, has had multiple falls recently. increased pain, safety concerns with inc fall risk.   Goals   Patient Goals \"Go home\"   STG Expiration Date 08/14/24   PT Treatment Day 1   Plan   Treatment/Interventions ADL retraining;Functional transfer training;LE strengthening/ROM;Elevations;Therapeutic exercise;Endurance training;Patient/family training;Equipment eval/education;Bed mobility;Gait training;Compensatory technique education;Spoke to nursing;Spoke to case management;OT   PT Frequency 3-5x/wk   Discharge Recommendation   Rehab Resource Intensity Level, PT I (Maximum Resource Intensity)   AM-PAC Basic Mobility Inpatient   Turning in Flat Bed Without Bedrails 3   Lying on Back to Sitting on Edge of Flat Bed Without Bedrails 3   Moving Bed to Chair 3   Standing Up From Chair Using Arms 3   Walk in Room 3   Climb 3-5 Stairs With Railing 2   Basic Mobility Inpatient Raw Score 17   Basic Mobility Standardized Score 39.67   [facility] Highest Level Of Mobility   -[facility] Goal 5: Stand one or more mins   -HL Achieved 7: Walk 25 feet or more   Additional Treatment Session   Start Time 0942   End Time 0952   Treatment Assessment Pt tolerated session fairly. She is reporting significant pain sitting. She was able to stand and complete ambulaiton wiht decreasing assistance as she c/o decreased pain in standing. She requires asisstance for brace management and cues for safety and improved tehcnique with mobility. She is limited by decreased strength, balance, endruance and icnreased pain. She will continue to benefit " from PT services to maximize LOF.   Equipment Use pt with significant pain sitting in recliner chair. use of RW. cues for hand placement. sit<>stand with RW with SBA. pt impulsive to stand due to pain. spt with RW with steadying-->SBA. ambulated 105'x1 with RW with steadying-->SBA with slow cary, decreased step lenght, foot clearance and cues to stay wihtin DENICE of RW. TLSO brace adjusted pror to mobility. again provided education on when to wear brace and when to don/doff.  pt verbalized understanding. discussed recommendation for post acute rehab upon discharge. Pt verbalize dunderstanding and in agreement.   End of Consult   Patient Position at End of Consult Bedside chair;Bed/Chair alarm activated;All needs within reach       Pt requires PT/OT co-eval due to medical complexity, safety concerns, fall risk, significant assistance with mobility and/or cognitive-behavioral impairments.    (Please find full objective findings from PT assessment regarding body systems outlined above).     Assessment: Pt is a 53 y.o. female seen for PT evaluation s/p admission to [facility] on 7/30/2024 with Intractable back pain.  Order placed for PT services.  Upon evaluation: Pt is presenting with impaired functional mobility due to pain, decreased strength, decreased endurance, impaired balance, gait deviations, altered sensation, decreased safety awareness, impaired judgment, orthopedic restrictions, fall risk, and LE edema requiring  minimal assistance for bed mobility, steadying assistance for transfers, and minimal to steadying assistance for ambulation with RW . Pt's clinical presentation is currently unpredictable given the functional mobility deficits above, especially weakness, edema of extremities, decreased endurance, impaired balance, gait deviations, pain, decreased activity tolerance, decreased functional mobility tolerance, altered sensation, decreased safety awareness, and MAXWELL due to pain ,  coupled with fall risks as indicated by AM-PAC 6-Clicks: 17/24 as well as hx of falls, impaired balance, polypharmacy, impaired judgement, decreased safety awareness, limited sensation/neuropathy, and obesity and combined with medical complications of pain impacting overall mobility status, abnormal H&H, need for input for mobility technique/safety, and intractable back pain, L1 superior endplate and lower sacral segment fracture .  Pt's PMHx and comorbidities that may affect physical performance and progress include: CHF, obesity, and sarcoidosis, h/o malignant carcinoid tumor of small intestine, cirrhosis of liver . Personal factors affecting pt at time of IE include: inaccessible home environment, step(s) to enter environment, limited home support, inability to perform IADLs, inability to perform ADLs, inability to navigate level surfaces without external assistance, inability to ambulate household distances, and recent fall(s)/fall history. Pt will benefit from continued skilled PT services to address deficits as defined above and to maximize level of functional mobility to facilitate return toward PLOF and improved QOL. From PT/mobility standpoint, recommendation at time of d/c would be Level I (Maximum Resource Intensity) in order to reduce fall risk and maximize pt's functional independence and consistency with mobility. Recommend trial with walker next 1-2 sessions and ther ex next 1-2 sessions.       The patient's -[facility] Basic Mobility Inpatient Short Form Raw Score is 17. A Raw score of greater than 16 suggests the patient may benefit from discharge to home. Please also refer to the recommendation of the Physical Therapist for safe discharge planning.       Goals: Pt will: Perform bed mobility tasks with modified Independent to reposition in bed and prepare for transfers. Pt will perform transfers with modified Independent to decrease burden of care, decrease risk for falls, and improve activity tolerance  and prepare for ambulation. Pt will ambulate with RW or LRAD for >/= 250' with  modified Independent  to decrease burden of care, decrease risk for falls, improve activity tolerance, and improve gait quality and to access home environment. Pt will complete 1 step with LRAD and >/= 12 steps with unilateral handrail with modified Independent to decrease burden of care, return to home with FABRICIO, decrease risk for falls, and improve activity tolerance. Pt will participate in objective balance assessment to determine baseline fall risk. Pt will participate in SSWS assessment to determine level of mobility. Pt will increase B LE strength >/= 1/2 MMT grade to facilitate functional mobility. Pt ill don/doff TLSO brace modified independently to facilitate independence and safety with mobility.     Dianna Lawler, PT,DPT

## 2024-07-31 NOTE — UTILIZATION REVIEW
Initial Clinical Review    WAS OBSERVATION 7/30/24 @ 1528 CONVERTED TO INPATIENT ADMISSION 8/1/24 @ 1121 DUE TO CONTINUED STAY REQUIRED TO CARE FOR PATIENT WITH DX: INTRACTABLE BACK PAIN.    Admission: Date/Time/Statement:   Admission Orders (From admission, onward)       Ordered        08/01/24 1121  INPATIENT ADMISSION  Once            07/30/24 1528  Place in Observation  Once                          Orders Placed This Encounter   Procedures    INPATIENT ADMISSION     Standing Status:   Standing     Number of Occurrences:   1     Order Specific Question:   Level of Care     Answer:   Med Surg [16]     Order Specific Question:   Estimated length of stay     Answer:   More than 2 Midnights     Order Specific Question:   Certification     Answer:   I certify that inpatient services are medically necessary for this patient for a duration of greater than two midnights. See H&P and MD Progress Notes for additional information about the patient's course of treatment.     ED Arrival Information       Expected   -    Arrival   7/30/2024 12:01    Acuity   Urgent              Means of arrival   Ambulance    Escorted by   [facility]    Service   Hospitalist    Admission type   Emergency              Arrival complaint   Back Pain             Chief Complaint   Patient presents with    Back Pain     Pt arrives via EMS with c/o left body pain (abd, back, arm, leg) since fall last week. Pt reports she is taking oxycodone and flexeril for the pain without relief. Unable to ambulate due to pain .       Initial Presentation: 53 y.o. female to ED via EMS from home  Present to ED with  lower back pain.  Patient reports she recently had fell on 7/18/2024.  Pain is sharp, constant, located in the left side of back, left lower quadrant, radiates towards her groin and back of the thigh. Pain get aggravated with moving, coughing, turning twisting. Per patient, pain is 10 out of 10. Patient is taking pain medication, without any  significant relief.   PMHX: CHF, cirrhosis, sarcoidosis   Admitted to OBS with DX: Intractable back pain   on exam: lower back tenderness; pain 9/10   Imagin.  Recent subchondral fracture of the L1 superior endplate with mild loss of height. There is air density which is predominantly centered within the subchondral cleft, consistent with osteonecrosis/Kummel disease. 2.  Recent fracture of the lower sacral segment.   PLAN: pain / nausea control (see below); monitor labs; spine brace;  fluid restriction 1500; neurovascular checks; PT/OT eval - tx      Date: 24    Day 2 - OBSERVATION    Working with physical therapy, still complaining of significant pain. Pain 5-9/10. PT OT recommending acute rehab placement due to high risk for fall and significant pain patient needs supervised physical therapy. Per Neurosurgery - cont LSO brace  Plan: pain / nausea control (see below); monitor labs; cont LSO spine brace;  fluid restriction 1500; neurovascular checks; CM consult - placement      Date: 24 - CHANGED TO INPATIENT   still complaining of significant pain. Pain 9-10/10.   Plan: pain / nausea control (see below); monitor labs; cont LSO spine brace;  fluid restriction 1500; neurovascular checks; CM consult following for AR[facility] admission    Date: 24     Day 2 - INPATIENT   patient is still in significant amount of pain even the patient is walking with support of walker. Reports after ambulation, feels more pain. Pain 5-9/10. Reports when she laid down, feels better.   Plan: pain / nausea control (see below); monitor labs; cont LSO spine brace;  fluid restriction 1500; neurovascular checks; CM consult following for ARC admission      ED Triage Vitals [24 1202]   Temperature Pulse Respirations Blood Pressure SpO2 Pain Score   98.3 °F (36.8 °C) 93 18 164/63 95 % 9     Weight (last 2 days)       Date/Time Weight    24 0543 121 (267.2)    24 0600 122 (268.3)    24 0547 124 (273.81)             Vital Signs (last 3 days)       Date/Time Temp Pulse Resp BP MAP (mmHg) SpO2 Calculated FIO2 (%) - Nasal Cannula Nasal Cannula O2 Flow Rate (L/min) O2 Device Patient Position - Orthostatic VS Iris Coma Scale Score Pain    08/02/24 1506 -- 93 16 103/55 71 97 % -- -- -- -- -- --    08/02/24 1500 -- -- -- -- -- 96 % -- -- None (Room air) -- -- --    08/02/24 1407 -- -- -- -- -- -- -- -- -- -- -- 7    08/02/24 1130 -- -- -- -- -- -- -- -- -- -- -- No Pain    08/02/24 1120 -- -- -- -- -- -- -- -- -- -- -- 8 08/02/24 09:01:32 -- 86 -- 124/67 86 96 % -- -- -- -- -- --    08/02/24 0900 -- -- -- -- -- -- -- -- None (Room air) -- 15 --    08/02/24 0859 -- -- -- -- -- -- -- -- -- -- -- 5    08/02/24 0712 97.9 °F (36.6 °C) 83 20 108/61 77 93 % -- -- None (Room air) Lying -- --    08/02/24 0619 -- 81 18 126/68 87 93 % -- -- None (Room air) Lying -- --    08/02/24 0546 -- -- -- -- -- -- -- -- -- -- -- 9 08/02/24 0039 -- -- -- -- -- -- -- -- -- -- -- 8 08/02/24 00:37:45 -- 82 17 96/68 77 90 % -- -- None (Room air) Lying -- --    08/02/24 0030 -- -- -- -- -- 90 % -- -- None (Room air) -- 15 --    08/01/24 22:39:07 -- 86 -- 93/65 74 90 % -- -- -- -- -- --    08/01/24 2200 -- -- -- -- -- -- -- -- -- -- -- 9 08/01/24 2014 -- -- -- -- -- -- -- -- None (Room air) -- 15 --    08/01/24 1838 -- -- -- -- -- -- -- -- -- -- -- 8 08/01/24 1554 -- -- -- -- -- -- -- -- -- -- -- 4 08/01/24 1532 -- -- -- -- -- -- -- -- -- -- -- 9 08/01/24 15:10:03 -- 84 17 114/92 99 94 % -- -- -- -- -- --    08/01/24 1114 -- -- -- -- -- -- -- -- -- -- -- 9 08/01/24 09:29:07 -- 82 -- 102/68 79 92 % -- -- -- -- -- --    08/01/24 0831 -- -- -- -- -- -- -- -- -- -- -- 10 - Worst Possible Pain    08/01/24 0826 -- -- -- -- -- -- -- -- -- -- 15 --    08/01/24 07:08:20 98.4 °F (36.9 °C) 83 20 107/69 82 96 % -- -- None (Room air) Lying -- --    08/01/24 05:53:09 -- 82 -- 113/64 80 95 % -- -- -- -- -- --    08/01/24 0553 -- -- -- -- -- -- --  -- -- -- -- 9 08/01/24 0142 -- -- -- -- -- -- -- -- -- -- -- 9 07/31/24 23:49:50 -- 81 -- 106/64 78 90 % -- -- -- -- -- --    07/31/24 1940 -- -- -- -- -- -- -- -- None (Room air) -- -- No Pain    07/31/24 1634 -- -- -- -- -- -- -- -- -- -- -- 9 07/31/24 1522 -- 81 -- 106/59 75 91 % -- -- -- -- -- --    07/31/24 1518 97.9 °F (36.6 °C) 87 -- 94/52 66 93 % -- -- -- -- -- --    07/31/24 1201 -- -- -- -- -- -- -- -- -- -- -- 8 07/31/24 0913 -- -- -- -- -- -- -- -- -- -- -- 5 07/31/24 0912 -- -- -- -- -- -- -- -- -- -- -- 5 07/31/24 0805 -- -- -- -- -- 100 % -- -- -- -- 15 --    07/31/24 0801 -- -- -- -- -- -- -- -- -- -- -- 9 07/31/24 06:54:19 97.8 °F (36.6 °C) 83 20 122/72 89 97 % -- -- None (Room air) Lying -- --    07/31/24 06:07:32 -- 80 -- 122/69 87 95 % -- -- -- -- -- --    07/31/24 04:22:29 -- 78 -- 108/62 77 96 % -- -- -- -- -- --    07/31/24 0223 -- -- -- -- -- -- -- -- -- -- -- 7 07/31/24 02:03:18 -- 83 -- 112/60 77 94 % -- -- -- -- -- --    07/31/24 02:00:05 -- 83 -- -- -- 94 % 24 1 L/min -- -- -- --    07/30/24 2352 -- -- -- -- -- 94 % 28 2 L/min Nasal cannula -- -- --    07/30/24 23:02:27 -- 88 -- 96/53 67 93 % -- -- -- -- -- --    07/30/24 2256 -- -- -- -- -- -- -- -- -- -- -- 8    07/30/24 2000 -- 89 -- -- -- -- -- -- None (Room air) -- 15 6    07/30/24 1653 -- -- -- -- -- -- -- -- -- -- -- 10 - Worst Possible Pain    07/30/24 1640 -- -- -- -- -- 98 % -- -- None (Room air) -- 15 --    07/30/24 16:03:56 -- 91 14 144/84 104 92 % -- -- -- -- -- --    07/30/24 1602 -- -- -- -- -- -- -- -- -- -- -- 9    07/30/24 1445 -- 89 18 144/67 97 94 % -- -- None (Room air) Lying -- --    07/30/24 1300 -- 95 18 125/59 85 94 % -- -- None (Room air) Lying -- --    07/30/24 1205 -- -- -- -- -- -- -- -- None (Room air) -- 15 --    07/30/24 1202 98.3 °F (36.8 °C) 93 18 164/63 90 95 % -- -- None (Room air) Sitting -- 9              Pertinent Labs/Diagnostic Test Results:   Radiology:  XR spine lumbar 2  or 3 views injury   Final Interpretation by Sreekanth Alexander MD (07/31 1013)      Limited study. The L1 compression fracture is again seen and there seems to be some loss of stature of the L1 vertebral body compared with the prior CT.      The study was marked in EPIC for significant notification.         Computerized Assisted Algorithm (CAA) may have been used to analyze all applicable images.         Workstation performed: [id number]         CT abdomen pelvis with contrast   Final Interpretation by Sonu Valencia MD (07/30 1447)      1.  Recent subchondral fracture of the L1 superior endplate with mild loss of height. There is air density which is predominantly centered within the subchondral cleft, consistent with osteonecrosis/Kummel disease.   2.  Recent fracture of the lower sacral segment.   3.  No acute visceral organ injury.   4.  Hepatic cirrhosis, portal hypertension, portosystemic collateral vessels. No ascites.   5.  Bibasilar groundglass opacity and interlobular septal thickening, right greater than left. In the acute setting, diagnostic considerations include pneumonitis and cardiogenic/noncardiogenic edema.   6.  Small right pleural effusion.      Workstation performed: [id number]         XR spine lumbar 2 or 3 views injury    (Results Pending)       Results from last 7 days   Lab Units 08/02/24  0548 07/30/24  1224   WBC Thousand/uL 2.62* 2.19*   HEMOGLOBIN g/dL 10.7* 10.1*   HEMATOCRIT % 33.8* 31.0*   PLATELETS Thousands/uL 87* 74*   TOTAL NEUT ABS Thousands/µL 1.66* 1.54*       Results from last 7 days   Lab Units 08/02/24  0548 07/30/24  1224   SODIUM mmol/L 137 138   POTASSIUM mmol/L 3.6 3.9   CHLORIDE mmol/L 100 108   CO2 mmol/L 31 25   ANION GAP mmol/L 6 5   BUN mg/dL 10 8   CREATININE mg/dL 1.04 0.75   EGFR ml/min/1.73sq m 61 90   CALCIUM mg/dL 8.3* 8.0*     Results from last 7 days   Lab Units 08/02/24  0548 07/30/24  1224   AST U/L 34 41*   ALT U/L 18 19   ALK PHOS U/L 223* 206*    TOTAL PROTEIN g/dL 7.5 6.9   ALBUMIN g/dL 3.2* 3.0*   TOTAL BILIRUBIN mg/dL 0.95 1.22*   AMMONIA umol/L  --  31        Results from last 7 days   Lab Units 08/02/24  0548 07/30/24  1224   GLUCOSE RANDOM mg/dL 90 89        Results from last 7 days   Lab Units 07/30/24  1224   CK TOTAL U/L 40        Results from last 7 days   Lab Units 07/30/24  1224   PROTIME seconds 15.9*   INR  1.24*   PTT seconds 30        Results from last 7 days   Lab Units 07/30/24  1224   LACTIC ACID mmol/L 0.8           Results from last 7 days   Lab Units 07/30/24  1628   CLARITY UA  Clear   COLOR UA  Yellow   SPEC GRAV UA  1.010   PH UA  8.5*   GLUCOSE UA mg/dl Negative   KETONES UA mg/dl Negative   BLOOD UA  Negative   PROTEIN UA mg/dl Negative   NITRITE UA  Negative   BILIRUBIN UA  Negative   UROBILINOGEN UA E.U./dl 4.0*   LEUKOCYTES UA  Negative             ED Treatment-Medication Administration from 07/30/2024 1200 to 07/30/2024 1554         Date/Time Order Dose Route Action     07/30/2024 1227 HYDROmorphone (DILAUDID) injection 0.5 mg 0.5 mg Intravenous Given     07/30/2024 1336 iohexol (OMNIPAQUE) 350 MG/ML injection (MULTI-DOSE) 100 mL 100 mL Intravenous Given     07/30/2024 1443 HYDROmorphone (DILAUDID) injection 0.5 mg 0.5 mg Intravenous Given            Past Medical History:   Diagnosis Date    Carcinoid tumor     neuroendocrine    CHF (congestive heart failure) (HCC)     Cirrhosis of liver (HCC)      Present on Admission:   Other cirrhosis of liver (HCC)   History of malignant carcinoid tumor of small intestine   Class 3 severe obesity due to excess calories in adult (HCC)   Sarcoidosis   Chronic diastolic congestive heart failure (HCC)   Intractable back pain      Admitting Diagnosis: Back pain [M54.9]  Sacral fracture (HCC) [S32.10XA]  Intractable back pain [M54.9]  Ambulatory dysfunction [R26.2]  Closed compression fracture of body of L1 vertebra (HCC) [S32.010A]    Age/Sex: 53 y.o. female    Admission Orders: SCDs; I/O;  spine brace; I/S; daily wts; cardiac diet; fluid restriction 1500    Scheduled Medications:  amitriptyline, 75 mg, Oral, HS  Cholecalciferol, 5,000 Units, Oral, Weekly  dapsone, 50 mg, Oral, Daily  enoxaparin, 40 mg, Subcutaneous, Q12H  folic acid, 1 mg, Oral, Daily  gabapentin, 100 mg, Oral, BID  hydrocortisone, , Topical, BID  hydroxychloroquine, 200 mg, Oral, BID With Meals  lactulose, 20 g, Oral, 4x Daily  lidocaine, 1 patch, Topical, Daily  sertraline, 100 mg, Oral, Daily  spironolactone, 50 mg, Oral, Daily  torsemide, 20 mg, Oral, BID AC      Continuous IV Infusions: None       PRN Meds:  acetaminophen, 488 mg, Oral, Q6H PRN  (7/30 recd x1)  (7/31 recd x1)   albuterol, 2 puff, Inhalation, Q4H PRN  HYDROmorphone, 1 mg, Intravenous, Q4H PRN  (7/30 recd x1)  (7/31 recd x3)  (8/1 recd x5)   (8/2 recd x2 so far today)    naloxone, 0.04 mg, Intravenous, Q1MIN PRN  ondansetron, 4 mg, Intravenous, Q6H PRN  (7/30 recd x1)  (7/31 recd x2)  (8/1 recd x1)  (8/2 recd x1 so far today)    polyethylene glycol, 17 g, Oral, Daily PRN  HYDROmorphone (DILAUDID) injection 0.5 mg, Intravenous, Q3H PRN  (recd 7/30)  (8/1 recd x2)  (8/2 recd x1 so far today)    oxyCODONE (ROXICODONE) IR tablet 5 mg, Oral Q4H PRN  (8/2 recd x2 so far today)          IP CONSULT TO CASE MANAGEMENT  IP CONSULT TO NEUROSURGERY  INPATIENT CONSULT TO IR    Network Utilization Review Department  ATTENTION: Please call with any questions or concerns to [phone] and carefully listen to the prompts so that you are directed to the right person. All voicemails are confidential.   For Discharge needs, contact Care Management DC Support Team at [phone] opt. [phone]  Send all requests for admission clinical reviews, approved or denied determinations and any other requests to dedicated fax number below belonging to the campus where the patient is receiving treatment. List of dedicated fax numbers for the Facilities:  FACILITY NAME UR FAX NUMBER   ADMISSION DENIALS  (Administrative/Medical Necessity) [phone]   DISCHARGE SUPPORT TEAM (NETWORK) [phone]   PARENT CHILD HEALTH (Maternity/NICU/Pediatrics) [phone]   [facility] [phone]   [facility] [phone]   [facility] [phone]   [facility] [phone]   [facility] [phone]   [facility] [phone]   [facility] [phone]   [facility] [phone]   [facility] [phone]   [facility] [phone]   [facility] [phone]   [facility] [phone]

## 2024-07-31 NOTE — PROGRESS NOTES
[facility]  Progress Note  Name: Bailey Olsen I  MRN: [id number]  Unit/Bed#: MS 231Jules01 I Date of Admission: 2024   Date of Service: 2024 I Hospital Day: 0    Assessment & Plan   Ambulatory dysfunction  Assessment & Plan  Imagin.  Recent subchondral fracture of the L1 superior endplate with mild loss of height. There is air density which is predominantly centered within the subchondral cleft, consistent with osteonecrosis/Kummel disease.   2.  Recent fracture of the lower sacral segment.     Ambulatory dysfunction due to intractable pain secondary to L1 superior endplate and lower sacral segment fracture  Continue pain management,   PT OT recommending acute rehab placement  Neurosurgery consult appreciated, continue LSO brace.    * Intractable back pain  Assessment & Plan  Imagin.  Recent subchondral fracture of the L1 superior endplate with mild loss of height. There is air density which is predominantly centered within the subchondral cleft, consistent with osteonecrosis/Kummel disease.   2.  Recent fracture of the lower sacral segment.     Ambulatory dysfunction due to intractable pain secondary to L1 superior endplate and lower sacral segment fracture  Continue pain management,   PT OT recommending acute rehab placement-due to high risk for fall and significant pain patient needs supervised physical therapy.  Patient had recent history of fall about 2 weeks ago and recently got discharged from this hospital.    Chronic diastolic congestive heart failure (HCC)  Assessment & Plan  Wt Readings from Last 3 Encounters:   24 124 kg (273 lb 13 oz)   24 121 kg (266 lb)   24 121 kg (266 lb 12.8 oz)     Not in acute exacerbation,  Continue home medication, low-salt diet, fluid restriction , intake and output  Continue torsemide  2D echo done 2024 was normal EF.             VTE Pharmacologic Prophylaxis: VTE Score: 10 High Risk (Score >/= 5) -  Pharmacological DVT Prophylaxis Ordered: enoxaparin (Lovenox). Sequential Compression Devices Ordered.    Mobility:   Basic Mobility Inpatient Raw Score: 17  JH-HLM Goal: 5: Stand one or more mins  JH-HLM Achieved: 7: Walk 25 feet or more  JH-HLM Goal achieved. Continue to encourage appropriate mobility.    Patient Centered Rounds: I performed bedside rounds with nursing staff today.   Discussions with Specialists or Other Care Team Provider: Neurosurgery    Education and Discussions with Family / Patient: Updated  (sister) via phone.-Via patient's cell phone with loud speaker      Current Length of Stay: 0 day(s)  Current Patient Status: Observation   Certification Statement: The patient will continue to require additional inpatient hospital stay due to monitorable conditions  Discharge Plan: Anticipate discharge in 24-48 hrs to rehab facility.    Code Status: Level 1 - Full Code    Subjective:   Seen and evaluated on exam.  Working with physical therapy, still complaining of significant pain.  Denies any tingling, numbness.    Objective:     Vitals:   Temp (24hrs), Av.8 °F (36.6 °C), Min:97.8 °F (36.6 °C), Max:97.8 °F (36.6 °C)    Temp:  [97.8 °F (36.6 °C)] 97.8 °F (36.6 °C)  HR:  [78-91] 83  Resp:  [14-20] 20  BP: ()/(53-84) 122/72  SpO2:  [92 %-100 %] 100 %  Body mass index is 45.56 kg/m².     Input and Output Summary (last 24 hours):     Intake/Output Summary (Last 24 hours) at 2024 1331  Last data filed at 2024 1328  Gross per 24 hour   Intake 540 ml   Output 3225 ml   Net -2685 ml       Physical Exam:   Physical Exam  Vitals and nursing note reviewed.   Constitutional:       Appearance: Normal appearance. She is obese. She is not ill-appearing.   Eyes:      Extraocular Movements: Extraocular movements intact.      Conjunctiva/sclera: Conjunctivae normal.      Pupils: Pupils are equal, round, and reactive to light.   Cardiovascular:      Rate and Rhythm: Normal rate.       Heart sounds: No murmur heard.     No friction rub. No gallop.   Pulmonary:      Effort: Pulmonary effort is normal. No respiratory distress.      Breath sounds: No stridor. No wheezing or rhonchi.   Abdominal:      General: There is no distension.      Palpations: There is no mass.      Tenderness: There is no abdominal tenderness.      Hernia: No hernia is present.   Musculoskeletal:         General: Tenderness (lower back) present.   Skin:     Capillary Refill: Capillary refill takes less than 2 seconds.      Findings: No rash.   Neurological:      Mental Status: She is alert and oriented to person, place, and time.      Cranial Nerves: No cranial nerve deficit.      Sensory: No sensory deficit.      Motor: No weakness.      Coordination: Coordination normal.          Additional Data:     Labs:  Results from last 7 days   Lab Units 07/30/24  1224   WBC Thousand/uL 2.19*   HEMOGLOBIN g/dL 10.1*   HEMATOCRIT % 31.0*   PLATELETS Thousands/uL 74*   SEGS PCT % 70   LYMPHO PCT % 20   MONO PCT % 6   EOS PCT % 2     Results from last 7 days   Lab Units 07/30/24  1224   SODIUM mmol/L 138   POTASSIUM mmol/L 3.9   CHLORIDE mmol/L 108   CO2 mmol/L 25   BUN mg/dL 8   CREATININE mg/dL 0.75   ANION GAP mmol/L 5   CALCIUM mg/dL 8.0*   ALBUMIN g/dL 3.0*   TOTAL BILIRUBIN mg/dL 1.22*   ALK PHOS U/L 206*   ALT U/L 19   AST U/L 41*   GLUCOSE RANDOM mg/dL 89     Results from last 7 days   Lab Units 07/30/24  1224   INR  1.24*             Results from last 7 days   Lab Units 07/30/24  1224   LACTIC ACID mmol/L 0.8       Lines/Drains:  Invasive Devices       Peripheral Intravenous Line  Duration             Peripheral IV 07/30/24 Left Antecubital 1 day              Drain  Duration             External Urinary Catheter <1 day                          Imaging: Reviewed radiology reports from this admission including: xray(s)    Recent Cultures (last 7 days):         Last 24 Hours Medication List:   Current Facility-Administered  Medications   Medication Dose Route Frequency Provider Last Rate    acetaminophen  488 mg Oral Q6H PRN Ruslan Keyes MD      albuterol  2 puff Inhalation Q4H PRN Ruslan Keyes MD      amitriptyline  75 mg Oral HS Ruslan Keyes MD      Cholecalciferol  5,000 Units Oral Weekly Ruslan Keyes MD      dapsone  50 mg Oral Daily Ruslan Keyes MD      enoxaparin  40 mg Subcutaneous Q12H Ruslan Keyes MD      folic acid  1 mg Oral Daily Ruslan Keyes MD      gabapentin  100 mg Oral BID Ruslan Keyes MD      hydrocortisone   Topical BID Ruslan Keyes MD      HYDROmorphone  1 mg Intravenous Q4H PRN Ruslan Keyes MD      hydroxychloroquine  200 mg Oral BID With Meals Ruslan Keyes MD      lactulose  20 g Oral 4x Daily Ruslan Keyes MD      lidocaine  1 patch Topical Daily Ruslan Keyes MD      naloxone  0.04 mg Intravenous Q1MIN PRN Ruslan Keyes MD      ondansetron  4 mg Intravenous Q6H PRN Ruslan Keyes MD      polyethylene glycol  17 g Oral Daily PRN Ruslan Keyes MD      sertraline  100 mg Oral Daily Ruslan Keyes MD      spironolactone  50 mg Oral Daily Rsulan Keyes MD      torsemide  20 mg Oral BID AC Ruslan Keyes MD          Today, Patient Was Seen By: Ruslan Keyes MD    **Please Note: This note may have been constructed using a voice recognition system.**

## 2024-07-31 NOTE — ASSESSMENT & PLAN NOTE
Imagin.  Recent subchondral fracture of the L1 superior endplate with mild loss of height. There is air density which is predominantly centered within the subchondral cleft, consistent with osteonecrosis/Kummel disease.   2.  Recent fracture of the lower sacral segment.     Ambulatory dysfunction due to intractable pain secondary to L1 superior endplate and lower sacral segment fracture  Continue pain management,   PT OT recommending acute rehab placement  Neurosurgery consult appreciated, continue LSO brace.

## 2024-07-31 NOTE — PLAN OF CARE
Problem: PHYSICAL THERAPY ADULT  Goal: Performs mobility at highest level of function for planned discharge setting.  See evaluation for individualized goals.  Description: Treatment/Interventions: ADL retraining, Functional transfer training, LE strengthening/ROM, Elevations, Therapeutic exercise, Endurance training, Patient/family training, Equipment eval/education, Bed mobility, Gait training, Compensatory technique education, Spoke to nursing, Spoke to case management, OT          See flowsheet documentation for full assessment, interventions and recommendations.  Note: Prognosis: Good  Problem List: Decreased strength, Impaired balance, Decreased endurance, Decreased mobility, Decreased safety awareness, Impaired sensation, Obesity, Pain  Assessment: Pt is a 53 y.o. female seen for PT evaluation s/p admission to [facility] on 7/30/2024 with Intractable back pain.  Order placed for PT services.  Upon evaluation: Pt is presenting with impaired functional mobility due to pain, decreased strength, decreased endurance, impaired balance, gait deviations, altered sensation, decreased safety awareness, impaired judgment, orthopedic restrictions, fall risk, and LE edema requiring  minimal assistance for bed mobility, steadying assistance for transfers, and minimal to steadying assistance for ambulation with RW . Pt's clinical presentation is currently unpredictable given the functional mobility deficits above, especially weakness, edema of extremities, decreased endurance, impaired balance, gait deviations, pain, decreased activity tolerance, decreased functional mobility tolerance, altered sensation, decreased safety awareness, and MAXWELL due to pain , coupled with fall risks as indicated by AM-PAC 6-Clicks: 17/24 as well as hx of falls, impaired balance, polypharmacy, impaired judgement, decreased safety awareness, limited sensation/neuropathy, and obesity and combined with medical complications of  pain impacting overall mobility status, abnormal H&H, need for input for mobility technique/safety, and intractable back pain, L1 superior endplate and lower sacral segment fracture .  Pt's PMHx and comorbidities that may affect physical performance and progress include: CHF, obesity, and sarcoidosis, h/o malignant carcinoid tumor of small intestine, cirrhosis of liver . Personal factors affecting pt at time of IE include: inaccessible home environment, step(s) to enter environment, limited home support, inability to perform IADLs, inability to perform ADLs, inability to navigate level surfaces without external assistance, inability to ambulate household distances, and recent fall(s)/fall history. Pt will benefit from continued skilled PT services to address deficits as defined above and to maximize level of functional mobility to facilitate return toward PLOF and improved QOL. From PT/mobility standpoint, recommendation at time of d/c would be Level I (Maximum Resource Intensity) in order to reduce fall risk and maximize pt's functional independence and consistency with mobility. Recommend trial with walker next 1-2 sessions and ther ex next 1-2 sessions.  Barriers to Discharge: Decreased caregiver support, Inaccessible home environment  Barriers to Discharge Comments: requires assistance to complete mobility, has had multiple falls recently. increased pain, safety concerns with inc fall risk.  Rehab Resource Intensity Level, PT: I (Maximum Resource Intensity)    See flowsheet documentation for full assessment.

## 2024-07-31 NOTE — PLAN OF CARE
Problem: Potential for Falls  Goal: Patient will remain free of falls  Description: INTERVENTIONS:  - Educate patient/family on patient safety including physical limitations  - Instruct patient to call for assistance with activity   - Consult OT/PT to assist with strengthening/mobility   - Keep Call bell within reach  - Keep bed low and locked with side rails adjusted as appropriate  - Keep care items and personal belongings within reach  - Initiate and maintain comfort rounds  - Make Fall Risk Sign visible to staff  - Offer Toileting every  Hours, in advance of need  - Initiate/Maintain alarm  - Obtain necessary fall risk management equipment:   - Apply yellow socks and bracelet for high fall risk patients  - Consider moving patient to room near nurses station  Outcome: Progressing     Problem: PAIN - ADULT  Goal: Verbalizes/displays adequate comfort level or baseline comfort level  Description: Interventions:  - Encourage patient to monitor pain and request assistance  - Assess pain using appropriate pain scale  - Administer analgesics based on type and severity of pain and evaluate response  - Implement non-pharmacological measures as appropriate and evaluate response  - Consider cultural and social influences on pain and pain management  - Notify physician/advanced practitioner if interventions unsuccessful or patient reports new pain  Outcome: Progressing     Problem: INFECTION - ADULT  Goal: Absence or prevention of progression during hospitalization  Description: INTERVENTIONS:  - Assess and monitor for signs and symptoms of infection  - Monitor lab/diagnostic results  - Monitor all insertion sites, i.e. indwelling lines, tubes, and drains  - Monitor endotracheal if appropriate and nasal secretions for changes in amount and color  - [place] appropriate cooling/warming therapies per order  - Administer medications as ordered  - Instruct and encourage patient and family to use good hand hygiene technique  -  Identify and instruct in appropriate isolation precautions for identified infection/condition  Outcome: Progressing  Goal: Absence of fever/infection during neutropenic period  Description: INTERVENTIONS:  - Monitor WBC    Outcome: Progressing     Problem: SAFETY ADULT  Goal: Patient will remain free of falls  Description: INTERVENTIONS:  - Educate patient/family on patient safety including physical limitations  - Instruct patient to call for assistance with activity   - Consult OT/PT to assist with strengthening/mobility   - Keep Call bell within reach  - Keep bed low and locked with side rails adjusted as appropriate  - Keep care items and personal belongings within reach  - Initiate and maintain comfort rounds  - Make Fall Risk Sign visible to staff  - Offer Toileting every  Hours, in advance of need  - Initiate/Maintain alarm  - Obtain necessary fall risk management equipment:   - Apply yellow socks and bracelet for high fall risk patients  - Consider moving patient to room near nurses station  Outcome: Progressing  Goal: Maintain or return to baseline ADL function  Description: INTERVENTIONS:  -  Assess patient's ability to carry out ADLs; assess patient's baseline for ADL function and identify physical deficits which impact ability to perform ADLs (bathing, care of mouth/teeth, toileting, grooming, dressing, etc.)  - Assess/evaluate cause of self-care deficits   - Assess range of motion  - Assess patient's mobility; develop plan if impaired  - Assess patient's need for assistive devices and provide as appropriate  - Encourage maximum independence but intervene and supervise when necessary  - Involve family in performance of ADLs  - Assess for home care needs following discharge   - Consider OT consult to assist with ADL evaluation and planning for discharge  - Provide patient education as appropriate  Outcome: Progressing  Goal: Maintains/Returns to pre admission functional level  Description:  INTERVENTIONS:  - Perform AM-PAC 6 Click Basic Mobility/ Daily Activity assessment daily.  - Set and communicate daily mobility goal to care team and patient/family/caregiver.   - Collaborate with rehabilitation services on mobility goals if consulted  - Perform Range of Motion  times a day.  - Reposition patient every  hours.  - Dangle patient  times a day  - Stand patient  times a day  - Ambulate patient  times a day  - Out of bed to chair  times a day   - Out of bed for meals  times a day  - Out of bed for toileting  - Record patient progress and toleration of activity level   Outcome: Progressing     Problem: DISCHARGE PLANNING  Goal: Discharge to home or other facility with appropriate resources  Description: INTERVENTIONS:  - Identify barriers to discharge w/patient and caregiver  - Arrange for needed discharge resources and transportation as appropriate  - Identify discharge learning needs (meds, wound care, etc.)  - Arrange for interpretive services to assist at discharge as needed  - Refer to Case Management Department for coordinating discharge planning if the patient needs post-hospital services based on physician/advanced practitioner order or complex needs related to functional status, cognitive ability, or social support system  Outcome: Progressing     Problem: Knowledge Deficit  Goal: Patient/family/caregiver demonstrates understanding of disease process, treatment plan, medications, and discharge instructions  Description: Complete learning assessment and assess knowledge base.  Interventions:  - Provide teaching at level of understanding  - Provide teaching via preferred learning methods  Outcome: Progressing     Problem: Prexisting or High Potential for Compromised Skin Integrity  Goal: Skin integrity is maintained or improved  Description: INTERVENTIONS:  - Identify patients at risk for skin breakdown  - Assess and monitor skin integrity  - Assess and monitor nutrition and hydration status  -  Monitor labs   - Assess for incontinence   - Turn and reposition patient  - Assist with mobility/ambulation  - Relieve pressure over bony prominences  - Avoid friction and shearing  - Provide appropriate hygiene as needed including keeping skin clean and dry  - Evaluate need for skin moisturizer/barrier cream  - Collaborate with interdisciplinary team   - Patient/family teaching  - Consider wound care consult   Outcome: Progressing

## 2024-07-31 NOTE — DISCHARGE INSTR - AVS FIRST PAGE
Neurosurgery discharge instructions following spine fracture:     TLSO brace to be worn when out of bed OR head of bed greater than 45 degrees.  May place brace on while sitting on edge of bed. May be removed for showering.   Do not wear while sleeping or laying flat/reclined.  No bending, twisting or heavy lifting. No strenuous activities. No Driving.   Follow-up as scheduled in two weeks with repeat spine x-rays to be completed 2-3 days prior to visit.  Prescription has been entered electronically.      **Please notify MD immediately if you have increased back or leg pain. New numbness, tingling, weakness in your legs and/or bowel/bladder incontinence**

## 2024-08-01 PROCEDURE — 99232 SBSQ HOSP IP/OBS MODERATE 35: CPT | Performed by: FAMILY MEDICINE

## 2024-08-01 RX ORDER — HYDROMORPHONE HCL/PF 1 MG/ML
0.5 SYRINGE (ML) INJECTION
Status: DISCONTINUED | OUTPATIENT
Start: 2024-08-01 | End: 2024-08-03 | Stop reason: HOSPADM

## 2024-08-01 RX ADMIN — DAPSONE 50 MG: 25 TABLET ORAL at 08:40

## 2024-08-01 RX ADMIN — LACTULOSE 20 G: 10 SOLUTION ORAL at 11:11

## 2024-08-01 RX ADMIN — HYDROXYCHLOROQUINE SULFATE 200 MG: 200 TABLET, FILM COATED ORAL at 17:08

## 2024-08-01 RX ADMIN — LACTULOSE 20 G: 10 SOLUTION ORAL at 17:08

## 2024-08-01 RX ADMIN — ONDANSETRON 4 MG: 2 INJECTION INTRAMUSCULAR; INTRAVENOUS at 08:38

## 2024-08-01 RX ADMIN — ENOXAPARIN SODIUM 40 MG: 40 INJECTION SUBCUTANEOUS at 05:53

## 2024-08-01 RX ADMIN — HYDROXYCHLOROQUINE SULFATE 200 MG: 200 TABLET, FILM COATED ORAL at 08:23

## 2024-08-01 RX ADMIN — TORSEMIDE 20 MG: 20 TABLET ORAL at 05:53

## 2024-08-01 RX ADMIN — HYDROMORPHONE HYDROCHLORIDE 0.5 MG: 1 INJECTION, SOLUTION INTRAMUSCULAR; INTRAVENOUS; SUBCUTANEOUS at 18:38

## 2024-08-01 RX ADMIN — HYDROMORPHONE HYDROCHLORIDE 1 MG: 1 INJECTION, SOLUTION INTRAMUSCULAR; INTRAVENOUS; SUBCUTANEOUS at 01:42

## 2024-08-01 RX ADMIN — LIDOCAINE 5% 1 PATCH: 700 PATCH TOPICAL at 08:27

## 2024-08-01 RX ADMIN — LACTULOSE 20 G: 10 SOLUTION ORAL at 22:00

## 2024-08-01 RX ADMIN — LACTULOSE 20 G: 10 SOLUTION ORAL at 08:24

## 2024-08-01 RX ADMIN — GABAPENTIN 200 MG: 100 CAPSULE ORAL at 15:32

## 2024-08-01 RX ADMIN — AMITRIPTYLINE HYDROCHLORIDE 75 MG: 25 TABLET, FILM COATED ORAL at 22:00

## 2024-08-01 RX ADMIN — TORSEMIDE 20 MG: 20 TABLET ORAL at 15:32

## 2024-08-01 RX ADMIN — GLYCERIN 1 SUPPOSITORY: 2 SUPPOSITORY RECTAL at 20:19

## 2024-08-01 RX ADMIN — FOLIC ACID 1 MG: 1 TABLET ORAL at 08:23

## 2024-08-01 RX ADMIN — HYDROMORPHONE HYDROCHLORIDE 1 MG: 1 INJECTION, SOLUTION INTRAMUSCULAR; INTRAVENOUS; SUBCUTANEOUS at 05:53

## 2024-08-01 RX ADMIN — HYDROMORPHONE HYDROCHLORIDE 0.5 MG: 1 INJECTION, SOLUTION INTRAMUSCULAR; INTRAVENOUS; SUBCUTANEOUS at 08:31

## 2024-08-01 RX ADMIN — POLYETHYLENE GLYCOL 3350 17 G: 17 POWDER, FOR SOLUTION ORAL at 11:11

## 2024-08-01 RX ADMIN — HYDROMORPHONE HYDROCHLORIDE 1 MG: 1 INJECTION, SOLUTION INTRAMUSCULAR; INTRAVENOUS; SUBCUTANEOUS at 15:32

## 2024-08-01 RX ADMIN — GABAPENTIN 200 MG: 100 CAPSULE ORAL at 08:23

## 2024-08-01 RX ADMIN — HYDROMORPHONE HYDROCHLORIDE 1 MG: 1 INJECTION, SOLUTION INTRAMUSCULAR; INTRAVENOUS; SUBCUTANEOUS at 11:14

## 2024-08-01 RX ADMIN — GABAPENTIN 200 MG: 100 CAPSULE ORAL at 20:19

## 2024-08-01 RX ADMIN — ENOXAPARIN SODIUM 40 MG: 40 INJECTION SUBCUTANEOUS at 17:08

## 2024-08-01 RX ADMIN — HYDROMORPHONE HYDROCHLORIDE 1 MG: 1 INJECTION, SOLUTION INTRAMUSCULAR; INTRAVENOUS; SUBCUTANEOUS at 22:00

## 2024-08-01 RX ADMIN — SERTRALINE HYDROCHLORIDE 100 MG: 25 TABLET ORAL at 08:23

## 2024-08-01 NOTE — ASSESSMENT & PLAN NOTE
Imagin.  Recent subchondral fracture of the L1 superior endplate with mild loss of height. There is air density which is predominantly centered within the subchondral cleft, consistent with osteonecrosis/Kummel disease.   2.  Recent fracture of the lower sacral segment.     Ambulatory dysfunction due to intractable pain secondary to L1 superior endplate and lower sacral segment fracture  Continue pain management,   PT OT recommending acute rehab placement-due to high risk for fall and significant pain patient needs supervised physical therapy.  Patient had recent history of fall about 2 weeks ago and recently got discharged from this hospital.  Patient is still complaining of significant pain, pain medication adjusted, if does not control, will reach out to neurosurgery/IR for possible kyphoplasty if patient agrees

## 2024-08-01 NOTE — CONSULTS
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  I think vertebroplasty is reasonable.  I will start a conversation, in IR, to determine when and where we can best accommodate.    Medical Problems       Problem List       * (Principal) Intractable back pain    Other cirrhosis of liver (HCC)    History of malignant carcinoid tumor of small intestine    Class 3 severe obesity due to excess calories in adult (HCC)    Primary hypertension    Anxiety    Hypokalemia    Fibromyalgia    Iron deficiency anemia    Pancytopenia (HCC)    Sarcoidosis    Chronic diastolic congestive heart failure (HCC)    Wt Readings from Last 3 Encounters:   08/01/24 122 kg (268 lb 4.8 oz)   07/18/24 121 kg (266 lb)   07/08/24 121 kg (266 lb 12.8 oz)                 Bilateral pulmonary infiltrates    Hepatic encephalopathy (HCC)    Fall    Hyperammonemia (HCC)    Ambulatory dysfunction            Subjective:     Patient ID: Bailey Olsen is a 53 y.o. female.    History of Present Illness  Fall.  Conservative management since then.  Persistent pain.    Review of Systems      Past Medical History:   Diagnosis Date    Carcinoid tumor     neuroendocrine    CHF (congestive heart failure) (HCC)     Cirrhosis of liver (HCC)         History reviewed. No pertinent surgical history.     Social History     Tobacco Use   Smoking Status Never   Smokeless Tobacco Never        Social History     Substance and Sexual Activity   Alcohol Use Never        Social History     Substance and Sexual Activity   Drug Use Never        Allergies   Allergen Reactions    Morphine Nausea Only and Headache       Current Facility-Administered Medications   Medication Dose Route Frequency Provider Last Rate Last Admin     acetaminophen (TYLENOL) tablet 488 mg  488 mg Oral Q6H PRN Ruslan Keyes MD   488 mg at 07/31/24 0223    albuterol (PROVENTIL HFA,VENTOLIN HFA) inhaler 2 puff  2 puff Inhalation Q4H PRN Ruslan Keyes MD        amitriptyline (ELAVIL) tablet 75 mg  75 mg Oral HS Ruslan Keyes MD   75 mg at 07/31/24 2103    Cholecalciferol (VITAMIN D3) tablet 5,000 Units  5,000 Units Oral Weekly Ruslan Keyes MD   5,000 Units at 07/30/24 1630    dapsone tablet 50 mg  50 mg Oral Daily Ruslan Keyes MD   50 mg at 08/01/24 0840    enoxaparin (LOVENOX) subcutaneous injection 40 mg  40 mg Subcutaneous Q12H Ruslan Keyes MD   40 mg at 08/01/24 0553    folic acid (FOLVITE) tablet 1 mg  1 mg Oral Daily Ruslan Keyes MD   1 mg at 08/01/24 0823    gabapentin (NEURONTIN) capsule 200 mg  200 mg Oral TID Ruslan Keyes MD   200 mg at 08/01/24 1532    glycerin (adult) rectal suppository 1 suppository  1 suppository Rectal Once Ruslan Keyes MD        hydrocortisone 1 % cream   Topical BID Ruslan Keyes MD   1 Application at 07/30/24 1747    HYDROmorphone (DILAUDID) injection 0.5 mg  0.5 mg Intravenous Q3H PRN Ruslan Keyes MD   0.5 mg at 08/01/24 0831    HYDROmorphone (DILAUDID) injection 1 mg  1 mg Intravenous Q4H PRN Ruslan Keyes MD   1 mg at 08/01/24 1532    hydroxychloroquine (PLAQUENIL) tablet 200 mg  200 mg Oral BID With Meals Ruslan Keyes MD   200 mg at 08/01/24 0823    lactulose (CHRONULAC) oral solution 20 g  20 g Oral 4x Daily Ruslan Keyes MD   20 g at 08/01/24 1111    lidocaine (LIDODERM) 5 % patch 1 patch  1 patch Topical Daily Ruslan Keyes MD   1 patch at 08/01/24 0827    naloxone (NARCAN) 0.04 mg/mL syringe 0.04 mg  0.04 mg Intravenous Q1MIN PRN Ruslan Keyes MD        ondansetron (ZOFRAN) injection 4 mg  4 mg Intravenous Q6H PRN Ruslan Keyes MD   4 mg at 08/01/24 0838    oxyCODONE (ROXICODONE) IR tablet 5 mg  5 mg Oral Q4H PRN Ruslan Keyes MD         polyethylene glycol (MIRALAX) packet 17 g  17 g Oral Daily PRN Ruslan Keyes MD   17 g at 08/01/24 1111    sertraline (ZOLOFT) tablet 100 mg  100 mg Oral Daily Ruslan Keyes MD   100 mg at 08/01/24 0823    spironolactone (ALDACTONE) tablet 50 mg  50 mg Oral Daily Ruslan Keyes MD   50 mg at 07/31/24 0758    torsemide (DEMADEX) tablet 20 mg  20 mg Oral BID AC Ruslan Keyes MD   20 mg at 08/01/24 1532          Objective:    Vitals:    08/01/24 0600 08/01/24 0708 08/01/24 0929 08/01/24 1510   BP:  107/69 102/68 114/92   BP Location:  Right arm     Pulse:  83 82 84   Resp:  20  17   Temp:  98.4 °F (36.9 °C)     TempSrc:  Temporal     SpO2:  96% 92% 94%   Weight: 122 kg (268 lb 4.8 oz)      Height:            Physical Exam      Lab Results   Component Value Date    BNP 34 07/02/2024      Lab Results   Component Value Date    WBC 2.19 (L) 07/30/2024    HGB 10.1 (L) 07/30/2024    HCT 31.0 (L) 07/30/2024    MCV 99 (H) 07/30/2024    PLT 74 (L) 07/30/2024     Lab Results   Component Value Date    INR 1.24 (H) 07/30/2024    INR 1.16 07/18/2024    INR 1.26 (H) 07/06/2024    PROTIME 15.9 (H) 07/30/2024    PROTIME 15.2 (H) 07/18/2024    PROTIME 16.1 (H) 07/06/2024     Lab Results   Component Value Date    PTT 30 07/30/2024         I have personally reviewed pertinent imaging and laboratory results.     Code Status: Level 1 - Full Code  Advance Directive and Living Will:      Power of :    POLST:      IR has been consulted to evaluate the patient, determine the appropriate procedure, and whether or not a procedure can and should be performed.    Thank you for allowing me to participate in the care of Bailey Olsen. Please don't hesitate to call, text, email, or TigerText with any questions.     This text is generated with voice recognition software. There may be translation, syntax,  or grammatical errors. If you have any questions, please contact the dictating provider.

## 2024-08-01 NOTE — CASE MANAGEMENT
Case Management Discharge Planning Note    Patient name Baliey Olsen  Location /-01 MRN [id number]  : 1970 Date 2024       Current Admission Date: 2024  Current Admission Diagnosis:Intractable back pain   Patient Active Problem List    Diagnosis Date Noted Date Diagnosed    Ambulatory dysfunction 2024     Intractable back pain 2024     Hyperammonemia (HCC) 2024     Hepatic encephalopathy (HCC) 2024     Fall 2024     Other cirrhosis of liver (HCC) 2024     History of malignant carcinoid tumor of small intestine 2024     Class 3 severe obesity due to excess calories in adult (HCC) 2024     Primary hypertension 2024     Anxiety 2024     Hypokalemia 2024     Fibromyalgia 2024     Iron deficiency anemia 2024     Pancytopenia (HCC) 2024     Sarcoidosis 2024     Chronic diastolic congestive heart failure (HCC) 2024     Bilateral pulmonary infiltrates 2024       LOS (days): 0  Geometric Mean LOS (GMLOS) (days): 2.9  Days to GMLOS:2.7     OBJECTIVE:  Risk of Unplanned Readmission Score: 32.48         Current admission status: Inpatient   Preferred Pharmacy:   [facility] [address] [place] [address] [address] [facility]  [address] [facility] [address]  Phone: [phone] Fax: [phone]    Primary Care Provider: Nayely Brown DO    Primary Insurance: [facility]  Secondary Insurance:     DISCHARGE DETAILS:        A post acute care recommendation was made by your care team for Acute Rehab.  Discussed [facility] of Choice with patient. List of facilities given to patient via in person. patient aware the list is custom filtered for them by preference  and that [facility] post acute providers are designated. Patient choice list discussed with patient, she chose [facility] for acute rehab, reserved in [facility].

## 2024-08-01 NOTE — ASSESSMENT & PLAN NOTE
Wt Readings from Last 3 Encounters:   08/01/24 122 kg (268 lb 4.8 oz)   07/18/24 121 kg (266 lb)   07/08/24 121 kg (266 lb 12.8 oz)     Not in acute exacerbation,  Continue home medication, low-salt diet, fluid restriction , intake and output  Continue torsemide  2D echo done 05/2024 was normal EF.

## 2024-08-01 NOTE — PLAN OF CARE
Problem: Potential for Falls  Goal: Patient will remain free of falls  Description: INTERVENTIONS:  - Educate patient/family on patient safety including physical limitations  - Instruct patient to call for assistance with activity   - Consult OT/PT to assist with strengthening/mobility   - Keep Call bell within reach  - Keep bed low and locked with side rails adjusted as appropriate  - Keep care items and personal belongings within reach  - Initiate and maintain comfort rounds  - Make Fall Risk Sign visible to staff  - Offer Toileting every  Hours, in advance of need  - Initiate/Maintain alarm  - Obtain necessary fall risk management equipment:   - Apply yellow socks and bracelet for high fall risk patients  - Consider moving patient to room near nurses station  Outcome: Progressing     Problem: PAIN - ADULT  Goal: Verbalizes/displays adequate comfort level or baseline comfort level  Description: Interventions:  - Encourage patient to monitor pain and request assistance  - Assess pain using appropriate pain scale  - Administer analgesics based on type and severity of pain and evaluate response  - Implement non-pharmacological measures as appropriate and evaluate response  - Consider cultural and social influences on pain and pain management  - Notify physician/advanced practitioner if interventions unsuccessful or patient reports new pain  Outcome: Progressing     Problem: INFECTION - ADULT  Goal: Absence or prevention of progression during hospitalization  Description: INTERVENTIONS:  - Assess and monitor for signs and symptoms of infection  - Monitor lab/diagnostic results  - Monitor all insertion sites, i.e. indwelling lines, tubes, and drains  - Monitor endotracheal if appropriate and nasal secretions for changes in amount and color  - [place] appropriate cooling/warming therapies per order  - Administer medications as ordered  - Instruct and encourage patient and family to use good hand hygiene technique  -  Identify and instruct in appropriate isolation precautions for identified infection/condition  Outcome: Progressing  Goal: Absence of fever/infection during neutropenic period  Description: INTERVENTIONS:  - Monitor WBC    Outcome: Progressing     Problem: SAFETY ADULT  Goal: Patient will remain free of falls  Description: INTERVENTIONS:  - Educate patient/family on patient safety including physical limitations  - Instruct patient to call for assistance with activity   - Consult OT/PT to assist with strengthening/mobility   - Keep Call bell within reach  - Keep bed low and locked with side rails adjusted as appropriate  - Keep care items and personal belongings within reach  - Initiate and maintain comfort rounds  - Make Fall Risk Sign visible to staff  - Offer Toileting every  Hours, in advance of need  - Initiate/Maintain alarm  - Obtain necessary fall risk management equipment:   - Apply yellow socks and bracelet for high fall risk patients  - Consider moving patient to room near nurses station  Outcome: Progressing  Goal: Maintain or return to baseline ADL function  Description: INTERVENTIONS:  -  Assess patient's ability to carry out ADLs; assess patient's baseline for ADL function and identify physical deficits which impact ability to perform ADLs (bathing, care of mouth/teeth, toileting, grooming, dressing, etc.)  - Assess/evaluate cause of self-care deficits   - Assess range of motion  - Assess patient's mobility; develop plan if impaired  - Assess patient's need for assistive devices and provide as appropriate  - Encourage maximum independence but intervene and supervise when necessary  - Involve family in performance of ADLs  - Assess for home care needs following discharge   - Consider OT consult to assist with ADL evaluation and planning for discharge  - Provide patient education as appropriate  Outcome: Progressing  Goal: Maintains/Returns to pre admission functional level  Description:  INTERVENTIONS:  - Perform AM-PAC 6 Click Basic Mobility/ Daily Activity assessment daily.  - Set and communicate daily mobility goal to care team and patient/family/caregiver.   - Collaborate with rehabilitation services on mobility goals if consulted  - Perform Range of Motion  times a day.  - Reposition patient every  hours.  - Dangle patient  times a day  - Stand patient  times a day  - Ambulate patient  times a day  - Out of bed to chair times a day   - Out of bed for meals  times a day  - Out of bed for toileting  - Record patient progress and toleration of activity level   Outcome: Progressing     Problem: DISCHARGE PLANNING  Goal: Discharge to home or other facility with appropriate resources  Description: INTERVENTIONS:  - Identify barriers to discharge w/patient and caregiver  - Arrange for needed discharge resources and transportation as appropriate  - Identify discharge learning needs (meds, wound care, etc.)  - Arrange for interpretive services to assist at discharge as needed  - Refer to Case Management Department for coordinating discharge planning if the patient needs post-hospital services based on physician/advanced practitioner order or complex needs related to functional status, cognitive ability, or social support system  Outcome: Progressing     Problem: Knowledge Deficit  Goal: Patient/family/caregiver demonstrates understanding of disease process, treatment plan, medications, and discharge instructions  Description: Complete learning assessment and assess knowledge base.  Interventions:  - Provide teaching at level of understanding  - Provide teaching via preferred learning methods  Outcome: Progressing     Problem: Prexisting or High Potential for Compromised Skin Integrity  Goal: Skin integrity is maintained or improved  Description: INTERVENTIONS:  - Identify patients at risk for skin breakdown  - Assess and monitor skin integrity  - Assess and monitor nutrition and hydration status  -  Monitor labs   - Assess for incontinence   - Turn and reposition patient  - Assist with mobility/ambulation  - Relieve pressure over bony prominences  - Avoid friction and shearing  - Provide appropriate hygiene as needed including keeping skin clean and dry  - Evaluate need for skin moisturizer/barrier cream  - Collaborate with interdisciplinary team   - Patient/family teaching  - Consider wound care consult   Outcome: Progressing

## 2024-08-01 NOTE — NURSING NOTE
1500  Patient reports no BM yet, passing lots of gas, bowel sounds active; patient offered suppository but wants to wait to see if she has BM without it.       1852  Patient continues to want to wait on getting suppository; patient educated on need for bowel movement, patient verbalizes understanding; Dr. Keyes made aware; passed on in report.

## 2024-08-01 NOTE — ARC ADMISSION
Received referral on patient for possible ARC placement. Upon review of patient's case with ARC physician, patient deemed not ARC appropriate at this time. Lower level of care/therapies recommended. CM made aware.    Thank you,  Ashely Howell  [facility] Admissions

## 2024-08-01 NOTE — PLAN OF CARE
Problem: Potential for Falls  Goal: Patient will remain free of falls  Description: INTERVENTIONS:  - Educate patient/family on patient safety including physical limitations  - Instruct patient to call for assistance with activity   - Consult OT/PT to assist with strengthening/mobility   - Keep Call bell within reach  - Keep bed low and locked with side rails adjusted as appropriate  - Keep care items and personal belongings within reach  - Initiate and maintain comfort rounds  - Make Fall Risk Sign visible to staff    - Apply yellow socks and bracelet for high fall risk patients  - Consider moving patient to room near nurses station  Outcome: Progressing     Problem: PAIN - ADULT  Goal: Verbalizes/displays adequate comfort level or baseline comfort level  Description: Interventions:  - Encourage patient to monitor pain and request assistance  - Assess pain using appropriate pain scale  - Administer analgesics based on type and severity of pain and evaluate response  - Implement non-pharmacological measures as appropriate and evaluate response  - Consider cultural and social influences on pain and pain management  - Notify physician/advanced practitioner if interventions unsuccessful or patient reports new pain  Outcome: Progressing     Problem: Prexisting or High Potential for Compromised Skin Integrity  Goal: Skin integrity is maintained or improved  Description: INTERVENTIONS:  - Identify patients at risk for skin breakdown  - Assess and monitor skin integrity  - Assess and monitor nutrition and hydration status  - Monitor labs   - Assess for incontinence   - Turn and reposition patient  - Assist with mobility/ambulation  - Relieve pressure over bony prominences  - Avoid friction and shearing  - Provide appropriate hygiene as needed including keeping skin clean and dry  - Evaluate need for skin moisturizer/barrier cream  - Collaborate with interdisciplinary team   - Patient/family teaching  - Consider wound  care consult   Outcome: Progressing

## 2024-08-01 NOTE — PROGRESS NOTES
Patient:    MRN:  [id number]    Sindy Request ID:  [id number]    Level of care reserved:  Inpatient Rehab Facility    Partner Reserved:  [facility] [address] [phone]    Clinical needs requested:    Geography searched:  40 miles around [id number]    Start of Service:    Request sent:  2:08pm EDT on 7/31/2024 by Gabriella Kinney    Partner reserved:  3:32pm EDT on 8/1/2024 by Gabriella Kinney    Choice list shared:  11:40am EDT on 8/1/2024 by Gabriella Kinney

## 2024-08-01 NOTE — ARC ADMISSION
ARC  spoke with pt on the phone. Reviewed ARC program, acute rehab criteria and approval process, insurance authorization process, as well as ARC locations and preferences. Patient's preferred ARC location is [facility] and second choice is [facility].  ARC Rehab folder left with patient and all questions answered. Patient was made aware that ARC Reviewer will keep their  updated regarding referral status.

## 2024-08-01 NOTE — PROGRESS NOTES
[facility]  Progress Note  Name: Bailey Olsen I  MRN: [id number]  Unit/Bed#: -01 I Date of Admission: 2024   Date of Service: 2024 I Hospital Day: 0    Assessment & Plan   Ambulatory dysfunction  Assessment & Plan  Imagin.  Recent subchondral fracture of the L1 superior endplate with mild loss of height. There is air density which is predominantly centered within the subchondral cleft, consistent with osteonecrosis/Kummel disease.   2.  Recent fracture of the lower sacral segment.     Ambulatory dysfunction due to intractable pain secondary to L1 superior endplate and lower sacral segment fracture  Continue pain management,   PT OT recommending acute rehab placement  Neurosurgery consult appreciated, continue LSO brace-patient pain fluctuates, yesterday patient was much better with pain management, but this morning, patient complaining of severe pain and requiring IV pain medication.  Pain medication adjusted,  Also continue laxatives since patient has history of cirrhosis.    * Intractable back pain  Assessment & Plan  Imagin.  Recent subchondral fracture of the L1 superior endplate with mild loss of height. There is air density which is predominantly centered within the subchondral cleft, consistent with osteonecrosis/Kummel disease.   2.  Recent fracture of the lower sacral segment.     Ambulatory dysfunction due to intractable pain secondary to L1 superior endplate and lower sacral segment fracture  Continue pain management,   PT OT recommending acute rehab placement-due to high risk for fall and significant pain patient needs supervised physical therapy.  Patient had recent history of fall about 2 weeks ago and recently got discharged from this hospital.  Patient is still complaining of significant pain, pain medication adjusted, if does not control, will reach out to neurosurgery/IR for possible kyphoplasty if patient agrees    Chronic diastolic congestive  heart failure (HCC)  Assessment & Plan  Wt Readings from Last 3 Encounters:   08/01/24 122 kg (268 lb 4.8 oz)   07/18/24 121 kg (266 lb)   07/08/24 121 kg (266 lb 12.8 oz)     Not in acute exacerbation,  Continue home medication, low-salt diet, fluid restriction , intake and output  Continue torsemide  2D echo done 05/2024 was normal EF.           Sarcoidosis  Assessment & Plan  Being evaluated by pulmonology, continue home medication    Class 3 severe obesity due to excess calories in adult (HCC)  Assessment & Plan  Lifestyle modification.    History of malignant carcinoid tumor of small intestine  Assessment & Plan  History noted  Cont with routine outpatient follow up    Other cirrhosis of liver (HCC)  Assessment & Plan  Appears compensated  Secondary to sarcoidosis?  Continue with home lactulose, rifaximin, diuretics  Continue aggressive bowel regimen since patient did not have any bowel movement for the last 3 days also receiving narcotics for lower back injuries.             VTE Pharmacologic Prophylaxis: VTE Score: 10 High Risk (Score >/= 5) - Pharmacological DVT Prophylaxis Ordered: enoxaparin (Lovenox). Sequential Compression Devices Ordered.    Mobility:   Basic Mobility Inpatient Raw Score: 17  JH-HLM Goal: 5: Stand one or more mins  JH-HLM Achieved: 7: Walk 25 feet or more  JH-HLM Goal achieved. Continue to encourage appropriate mobility.    Patient Centered Rounds: I performed bedside rounds with nursing staff today.   Discussions with Specialists or Other Care Team Provider: None    Education and Discussions with Family / Patient:  With patient.       Current Length of Stay: 0 day(s)  Current Patient Status: Inpatient   Certification Statement: The patient will continue to require additional inpatient hospital stay due to monitorable conditions  Discharge Plan: Anticipate discharge in 24-48 hrs to rehab facility.    Code Status: Level 1 - Full Code    Subjective:   Seen and evaluated during  .  Lying  on the bed, still complaining of significant of pain especially with movements and activity.  Denies any tingling, numbness.  Had good bladder function.  Did not have any bowel movement for the last 3 days.    Objective:     Vitals:   Temp (24hrs), Av.2 °F (36.8 °C), Min:97.9 °F (36.6 °C), Max:98.4 °F (36.9 °C)    Temp:  [97.9 °F (36.6 °C)-98.4 °F (36.9 °C)] 98.4 °F (36.9 °C)  HR:  [81-87] 82  Resp:  [20] 20  BP: ()/(52-69) 102/68  SpO2:  [90 %-96 %] 92 %  Body mass index is 44.65 kg/m².     Input and Output Summary (last 24 hours):     Intake/Output Summary (Last 24 hours) at 2024 1121  Last data filed at 2024 0840  Gross per 24 hour   Intake 480 ml   Output 2650 ml   Net -2170 ml       Physical Exam:   Physical Exam  Vitals and nursing note reviewed.   Constitutional:       Appearance: Normal appearance. She is obese. She is not ill-appearing or diaphoretic.   Eyes:      General: No scleral icterus.        Left eye: No discharge.      Extraocular Movements: Extraocular movements intact.      Conjunctiva/sclera: Conjunctivae normal.      Pupils: Pupils are equal, round, and reactive to light.   Cardiovascular:      Rate and Rhythm: Normal rate.      Heart sounds: No murmur heard.     No friction rub. No gallop.   Pulmonary:      Effort: Pulmonary effort is normal. No respiratory distress.      Breath sounds: No stridor. No wheezing or rhonchi.   Abdominal:      General: There is no distension.      Palpations: There is no mass.      Tenderness: There is no abdominal tenderness. There is no guarding or rebound.      Hernia: No hernia is present.   Musculoskeletal:         General: Tenderness present.      Right lower leg: No edema.      Left lower leg: No edema.   Neurological:      Mental Status: She is alert and oriented to person, place, and time.      Cranial Nerves: No cranial nerve deficit.      Sensory: No sensory deficit.      Motor: No weakness.      Coordination: Coordination normal.          Additional Data:     Labs:  Results from last 7 days   Lab Units 07/30/24  1224   WBC Thousand/uL 2.19*   HEMOGLOBIN g/dL 10.1*   HEMATOCRIT % 31.0*   PLATELETS Thousands/uL 74*   SEGS PCT % 70   LYMPHO PCT % 20   MONO PCT % 6   EOS PCT % 2     Results from last 7 days   Lab Units 07/30/24  1224   SODIUM mmol/L 138   POTASSIUM mmol/L 3.9   CHLORIDE mmol/L 108   CO2 mmol/L 25   BUN mg/dL 8   CREATININE mg/dL 0.75   ANION GAP mmol/L 5   CALCIUM mg/dL 8.0*   ALBUMIN g/dL 3.0*   TOTAL BILIRUBIN mg/dL 1.22*   ALK PHOS U/L 206*   ALT U/L 19   AST U/L 41*   GLUCOSE RANDOM mg/dL 89     Results from last 7 days   Lab Units 07/30/24  1224   INR  1.24*             Results from last 7 days   Lab Units 07/30/24  1224   LACTIC ACID mmol/L 0.8       Lines/Drains:  Invasive Devices       Peripheral Intravenous Line  Duration             Peripheral IV 07/30/24 Left Antecubital 1 day                          Imaging: No pertinent imaging reviewed.    Recent Cultures (last 7 days):         Last 24 Hours Medication List:   Current Facility-Administered Medications   Medication Dose Route Frequency Provider Last Rate    acetaminophen  488 mg Oral Q6H PRN Ruslan Keyes MD      albuterol  2 puff Inhalation Q4H PRN Ruslan Keyes MD      amitriptyline  75 mg Oral HS Ruslan Keyes MD      Cholecalciferol  5,000 Units Oral Weekly Ruslan Keyes MD      dapsone  50 mg Oral Daily Ruslan Keyes MD      enoxaparin  40 mg Subcutaneous Q12H Ruslan Keyes MD      folic acid  1 mg Oral Daily Ruslan Keyes MD      gabapentin  200 mg Oral TID Ruslan Keyes MD      glycerin (adult)  1 suppository Rectal Once Ruslan Keyes MD      hydrocortisone   Topical BID Ruslan Keyes MD      HYDROmorphone  0.5 mg Intravenous Q3H PRN Ruslan Keyes MD      HYDROmorphone  1 mg Intravenous Q4H PRN Ruslan Keyes MD      hydroxychloroquine  200 mg Oral BID With Meals Ruslan Keyes MD      lactulose  20 g Oral 4x  Daily Ruslan Keyes MD      lidocaine  1 patch Topical Daily Ruslan Keyes MD      naloxone  0.04 mg Intravenous Q1MIN PRN Ruslan Keyes MD      ondansetron  4 mg Intravenous Q6H PRN Ruslan Keyes MD      oxyCODONE  5 mg Oral Q4H PRN Ruslan Keyes MD      polyethylene glycol  17 g Oral Daily PRN Ruslan Keyes MD      sertraline  100 mg Oral Daily Ruslan Keyes MD      spironolactone  50 mg Oral Daily Ruslan Keyes MD      torsemide  20 mg Oral BID AC Ruslan Keyes MD          Today, Patient Was Seen By: Ruslan Keyes MD    **Please Note: This note may have been constructed using a voice recognition system.**

## 2024-08-01 NOTE — ASSESSMENT & PLAN NOTE
Imagin.  Recent subchondral fracture of the L1 superior endplate with mild loss of height. There is air density which is predominantly centered within the subchondral cleft, consistent with osteonecrosis/Kummel disease.   2.  Recent fracture of the lower sacral segment.     Ambulatory dysfunction due to intractable pain secondary to L1 superior endplate and lower sacral segment fracture  Continue pain management,   PT OT recommending acute rehab placement  Neurosurgery consult appreciated, continue LSO brace-patient pain fluctuates, yesterday patient was much better with pain management, but this morning, patient complaining of severe pain and requiring IV pain medication.  Pain medication adjusted,  Also continue laxatives since patient has history of cirrhosis.

## 2024-08-01 NOTE — ASSESSMENT & PLAN NOTE
Appears compensated  Secondary to sarcoidosis?  Continue with home lactulose, rifaximin, diuretics  Continue aggressive bowel regimen since patient did not have any bowel movement for the last 3 days also receiving narcotics for lower back injuries.

## 2024-08-02 LAB
ALBUMIN SERPL BCG-MCNC: 3.2 G/DL (ref 3.5–5)
ALP SERPL-CCNC: 223 U/L (ref 34–104)
ALT SERPL W P-5'-P-CCNC: 18 U/L (ref 7–52)
ANION GAP SERPL CALCULATED.3IONS-SCNC: 6 MMOL/L (ref 4–13)
AST SERPL W P-5'-P-CCNC: 34 U/L (ref 13–39)
BASOPHILS # BLD AUTO: 0.03 THOUSANDS/ÂΜL (ref 0–0.1)
BASOPHILS NFR BLD AUTO: 1 % (ref 0–1)
BILIRUB SERPL-MCNC: 0.95 MG/DL (ref 0.2–1)
BUN SERPL-MCNC: 10 MG/DL (ref 5–25)
CALCIUM ALBUM COR SERPL-MCNC: 8.9 MG/DL (ref 8.3–10.1)
CALCIUM SERPL-MCNC: 8.3 MG/DL (ref 8.4–10.2)
CHLORIDE SERPL-SCNC: 100 MMOL/L (ref 96–108)
CO2 SERPL-SCNC: 31 MMOL/L (ref 21–32)
CREAT SERPL-MCNC: 1.04 MG/DL (ref 0.6–1.3)
EOSINOPHIL # BLD AUTO: 0.1 THOUSAND/ÂΜL (ref 0–0.61)
EOSINOPHIL NFR BLD AUTO: 4 % (ref 0–6)
ERYTHROCYTE [DISTWIDTH] IN BLOOD BY AUTOMATED COUNT: 13.8 % (ref 11.6–15.1)
GFR SERPL CREATININE-BSD FRML MDRD: 61 ML/MIN/1.73SQ M
GLUCOSE SERPL-MCNC: 122 MG/DL (ref 65–140)
GLUCOSE SERPL-MCNC: 90 MG/DL (ref 65–140)
HCT VFR BLD AUTO: 33.8 % (ref 34.8–46.1)
HGB BLD-MCNC: 10.7 G/DL (ref 11.5–15.4)
IMM GRANULOCYTES # BLD AUTO: 0 THOUSAND/UL (ref 0–0.2)
IMM GRANULOCYTES NFR BLD AUTO: 0 % (ref 0–2)
LYMPHOCYTES # BLD AUTO: 0.66 THOUSANDS/ÂΜL (ref 0.6–4.47)
LYMPHOCYTES NFR BLD AUTO: 25 % (ref 14–44)
MCH RBC QN AUTO: 31.9 PG (ref 26.8–34.3)
MCHC RBC AUTO-ENTMCNC: 31.7 G/DL (ref 31.4–37.4)
MCV RBC AUTO: 101 FL (ref 82–98)
MONOCYTES # BLD AUTO: 0.17 THOUSAND/ÂΜL (ref 0.17–1.22)
MONOCYTES NFR BLD AUTO: 7 % (ref 4–12)
NEUTROPHILS # BLD AUTO: 1.66 THOUSANDS/ÂΜL (ref 1.85–7.62)
NEUTS SEG NFR BLD AUTO: 63 % (ref 43–75)
NRBC BLD AUTO-RTO: 0 /100 WBCS
PLATELET # BLD AUTO: 87 THOUSANDS/UL (ref 149–390)
PMV BLD AUTO: 9.5 FL (ref 8.9–12.7)
POTASSIUM SERPL-SCNC: 3.6 MMOL/L (ref 3.5–5.3)
PROT SERPL-MCNC: 7.5 G/DL (ref 6.4–8.4)
RBC # BLD AUTO: 3.35 MILLION/UL (ref 3.81–5.12)
SODIUM SERPL-SCNC: 137 MMOL/L (ref 135–147)
WBC # BLD AUTO: 2.62 THOUSAND/UL (ref 4.31–10.16)

## 2024-08-02 PROCEDURE — 99232 SBSQ HOSP IP/OBS MODERATE 35: CPT | Performed by: FAMILY MEDICINE

## 2024-08-02 PROCEDURE — 80053 COMPREHEN METABOLIC PANEL: CPT | Performed by: FAMILY MEDICINE

## 2024-08-02 PROCEDURE — 85025 COMPLETE CBC W/AUTO DIFF WBC: CPT | Performed by: FAMILY MEDICINE

## 2024-08-02 PROCEDURE — 82948 REAGENT STRIP/BLOOD GLUCOSE: CPT

## 2024-08-02 PROCEDURE — 94760 N-INVAS EAR/PLS OXIMETRY 1: CPT

## 2024-08-02 PROCEDURE — 97116 GAIT TRAINING THERAPY: CPT | Performed by: PHYSICAL THERAPIST

## 2024-08-02 RX ORDER — TIZANIDINE 2 MG/1
4 TABLET ORAL 3 TIMES DAILY
Status: DISCONTINUED | OUTPATIENT
Start: 2024-08-02 | End: 2024-08-03 | Stop reason: HOSPADM

## 2024-08-02 RX ADMIN — ONDANSETRON 4 MG: 2 INJECTION INTRAMUSCULAR; INTRAVENOUS at 14:52

## 2024-08-02 RX ADMIN — ACETAMINOPHEN 325MG 488 MG: 325 TABLET ORAL at 17:59

## 2024-08-02 RX ADMIN — HYDROMORPHONE HYDROCHLORIDE 1 MG: 1 INJECTION, SOLUTION INTRAMUSCULAR; INTRAVENOUS; SUBCUTANEOUS at 05:46

## 2024-08-02 RX ADMIN — ENOXAPARIN SODIUM 40 MG: 40 INJECTION SUBCUTANEOUS at 05:46

## 2024-08-02 RX ADMIN — HYDROMORPHONE HYDROCHLORIDE 1 MG: 1 INJECTION, SOLUTION INTRAMUSCULAR; INTRAVENOUS; SUBCUTANEOUS at 11:20

## 2024-08-02 RX ADMIN — GABAPENTIN 200 MG: 100 CAPSULE ORAL at 08:58

## 2024-08-02 RX ADMIN — TIZANIDINE 4 MG: 2 TABLET ORAL at 20:58

## 2024-08-02 RX ADMIN — LIDOCAINE 5% 1 PATCH: 700 PATCH TOPICAL at 08:57

## 2024-08-02 RX ADMIN — HYDROCORTISONE: 1 CREAM TOPICAL at 17:25

## 2024-08-02 RX ADMIN — TORSEMIDE 20 MG: 20 TABLET ORAL at 06:20

## 2024-08-02 RX ADMIN — LACTULOSE 20 G: 10 SOLUTION ORAL at 08:58

## 2024-08-02 RX ADMIN — SPIRONOLACTONE 50 MG: 25 TABLET ORAL at 09:00

## 2024-08-02 RX ADMIN — LACTULOSE 20 G: 10 SOLUTION ORAL at 17:23

## 2024-08-02 RX ADMIN — OXYCODONE HYDROCHLORIDE 5 MG: 5 TABLET ORAL at 08:59

## 2024-08-02 RX ADMIN — HYDROXYCHLOROQUINE SULFATE 200 MG: 200 TABLET, FILM COATED ORAL at 09:00

## 2024-08-02 RX ADMIN — AMITRIPTYLINE HYDROCHLORIDE 75 MG: 25 TABLET, FILM COATED ORAL at 21:00

## 2024-08-02 RX ADMIN — GABAPENTIN 200 MG: 100 CAPSULE ORAL at 20:59

## 2024-08-02 RX ADMIN — TIZANIDINE 4 MG: 2 TABLET ORAL at 17:24

## 2024-08-02 RX ADMIN — HYDROMORPHONE HYDROCHLORIDE 0.5 MG: 1 INJECTION, SOLUTION INTRAMUSCULAR; INTRAVENOUS; SUBCUTANEOUS at 14:07

## 2024-08-02 RX ADMIN — HYDROCORTISONE: 1 CREAM TOPICAL at 09:00

## 2024-08-02 RX ADMIN — TORSEMIDE 20 MG: 20 TABLET ORAL at 17:25

## 2024-08-02 RX ADMIN — OXYCODONE HYDROCHLORIDE 5 MG: 5 TABLET ORAL at 00:39

## 2024-08-02 RX ADMIN — LACTULOSE 20 G: 10 SOLUTION ORAL at 21:00

## 2024-08-02 RX ADMIN — HYDROXYCHLOROQUINE SULFATE 200 MG: 200 TABLET, FILM COATED ORAL at 17:25

## 2024-08-02 RX ADMIN — LACTULOSE 20 G: 10 SOLUTION ORAL at 11:20

## 2024-08-02 RX ADMIN — ENOXAPARIN SODIUM 40 MG: 40 INJECTION SUBCUTANEOUS at 17:23

## 2024-08-02 RX ADMIN — OXYCODONE HYDROCHLORIDE 5 MG: 5 TABLET ORAL at 17:59

## 2024-08-02 RX ADMIN — DAPSONE 50 MG: 25 TABLET ORAL at 09:00

## 2024-08-02 RX ADMIN — SERTRALINE HYDROCHLORIDE 100 MG: 25 TABLET ORAL at 08:58

## 2024-08-02 RX ADMIN — POLYETHYLENE GLYCOL 3350 17 G: 17 POWDER, FOR SOLUTION ORAL at 18:00

## 2024-08-02 RX ADMIN — FOLIC ACID 1 MG: 1 TABLET ORAL at 08:58

## 2024-08-02 RX ADMIN — GABAPENTIN 200 MG: 100 CAPSULE ORAL at 17:25

## 2024-08-02 NOTE — ASSESSMENT & PLAN NOTE
Imagin.  Recent subchondral fracture of the L1 superior endplate with mild loss of height. There is air density which is predominantly centered within the subchondral cleft, consistent with osteonecrosis/Kummel disease.   2.  Recent fracture of the lower sacral segment.     Ambulatory dysfunction due to intractable pain secondary to L1 superior endplate and lower sacral segment fracture  Continue pain management,   PT OT recommending acute rehab placement  Neurosurgery consult appreciated, continue LSO brace-patient pain fluctuates, yesterday patient was much better with pain management, but this morning, patient complaining of severe pain and requiring IV pain medication.  Pain medication adjusted,  Also continue laxatives since patient has history of cirrhosis.  Patient had bowel movement.  Patient, she was walking with support of walker in the hallway but feels pain after ambulatory function.  Denies any significant tingling and numbness.

## 2024-08-02 NOTE — RESPIRATORY THERAPY NOTE
08/02/24 1500   Respiratory Assessment   Resp Comments Pt is stable on RA no need for prn at this time

## 2024-08-02 NOTE — CASE MANAGEMENT
Case Management Discharge Planning Note    Patient name Bailey Olsen  Location /-01 MRN [id number]  : 1970 Date 2024       Current Admission Date: 2024  Current Admission Diagnosis:Intractable back pain   Patient Active Problem List    Diagnosis Date Noted Date Diagnosed    Ambulatory dysfunction 2024     Intractable back pain 2024     Hyperammonemia (HCC) 2024     Hepatic encephalopathy (HCC) 2024     Fall 2024     Other cirrhosis of liver (HCC) 2024     History of malignant carcinoid tumor of small intestine 2024     Class 3 severe obesity due to excess calories in adult (HCC) 2024     Primary hypertension 2024     Anxiety 2024     Hypokalemia 2024     Fibromyalgia 2024     Iron deficiency anemia 2024     Pancytopenia (HCC) 2024     Sarcoidosis 2024     Chronic diastolic congestive heart failure (HCC) 2024     Bilateral pulmonary infiltrates 2024       LOS (days): 1  Geometric Mean LOS (GMLOS) (days): 2.9  Days to GMLOS:1.7     OBJECTIVE:  Risk of Unplanned Readmission Score: 32.87         Current admission status: Inpatient   Preferred Pharmacy:   [facility] [address] [place] [address] [address] [facility]  [address] [address]  Phone: [phone] Fax: [phone]    Primary Care Provider: Nayely Brown DO    Primary Insurance: [facility]  Secondary Insurance:     DISCHARGE DETAILS:        Patient reevaluated by physical therapy, ambulating 400 ft, care recommendations for level 3. CM updated [facility] acute rehab with updated notes, per More at [facility] patient does not qualify for acute rehab at this time. Patient aware, requesting PT/OT services at home. Patient agreeable to blanket referral for HHC, placed in [facility]. Per patient she had [facility] for HHC and wants HHC with [facility]. When ready for discharge, spouse can provide transportation  home.

## 2024-08-02 NOTE — PROGRESS NOTES
[facility]  Progress Note  Name: Bailey Olsen I  MRN: [id number]  Unit/Bed#: -01 I Date of Admission: 2024   Date of Service: 2024 I Hospital Day: 1    Assessment & Plan   Ambulatory dysfunction  Assessment & Plan  Imagin.  Recent subchondral fracture of the L1 superior endplate with mild loss of height. There is air density which is predominantly centered within the subchondral cleft, consistent with osteonecrosis/Kummel disease.   2.  Recent fracture of the lower sacral segment.     Ambulatory dysfunction due to intractable pain secondary to L1 superior endplate and lower sacral segment fracture  Continue pain management,   PT OT recommending acute rehab placement  Neurosurgery consult appreciated, continue LSO brace-patient pain fluctuates, yesterday patient was much better with pain management, but this morning, patient complaining of severe pain and requiring IV pain medication.  Pain medication adjusted,  Also continue laxatives since patient has history of cirrhosis.  Patient had bowel movement.  Patient, she was walking with support of walker in the hallway but feels pain after ambulatory function.  Denies any significant tingling and numbness.    * Intractable back pain  Assessment & Plan  Imagin.  Recent subchondral fracture of the L1 superior endplate with mild loss of height. There is air density which is predominantly centered within the subchondral cleft, consistent with osteonecrosis/Kummel disease.   2.  Recent fracture of the lower sacral segment.     Ambulatory dysfunction due to intractable pain secondary to L1 superior endplate and lower sacral segment fracture  Continue pain management,   PT OT recommending acute rehab placement-due to high risk for fall and significant pain patient needs supervised physical therapy.  Patient had recent history of fall about 2 weeks ago and recently got discharged from this hospital.  Patient is still  complaining of significant pain, pain medication adjusted, patient is still in significant amount of pain even the patient is walking with support of walker.  Reports after ambulation, feels more pain.  IR for possible kyphoplasty     Chronic diastolic congestive heart failure (HCC)  Assessment & Plan  Wt Readings from Last 3 Encounters:   08/02/24 121 kg (267 lb 3.2 oz)   07/18/24 121 kg (266 lb)   07/08/24 121 kg (266 lb 12.8 oz)     Not in acute exacerbation,  Continue home medication, low-salt diet, fluid restriction , intake and output  Continue torsemide  2D echo done 05/2024 was normal EF.           Sarcoidosis  Assessment & Plan  Being evaluated by pulmonology, continue home medication    Class 3 severe obesity due to excess calories in adult (HCC)  Assessment & Plan  Lifestyle modification.    History of malignant carcinoid tumor of small intestine  Assessment & Plan  History noted  Cont with routine outpatient follow up    Other cirrhosis of liver (HCC)  Assessment & Plan  Appears compensated  Secondary to sarcoidosis?  Continue with home lactulose, rifaximin, diuretics  Continue aggressive bowel regimen since patient did not have any bowel movement for the last 3 days also receiving narcotics for lower back injuries.             VTE Pharmacologic Prophylaxis: VTE Score: 10 High Risk (Score >/= 5) - Pharmacological DVT Prophylaxis Ordered: enoxaparin (Lovenox). Sequential Compression Devices Ordered.    Mobility:   Basic Mobility Inpatient Raw Score: 17  JH-HLM Goal: 5: Stand one or more mins  JH-HLM Achieved: 7: Walk 25 feet or more  JH-HLM Goal achieved. Continue to encourage appropriate mobility.    Patient Centered Rounds: I performed bedside rounds with nursing staff today.   Discussions with Specialists or Other Care Team Provider: None    Education and Discussions with Family / Patient:  with patient.       Current Length of Stay: 1 day(s)  Current Patient Status: Inpatient   Certification  Statement: The patient will continue to require additional inpatient hospital stay due to monitorable conditions  Discharge Plan: Anticipate discharge in 24-48 hrs to rehab facility.    Code Status: Level 1 - Full Code    Subjective:   Seen and evaluated during the event.  Resting comfortably.  Reports when she laid down, feels better.  Also reports she was walking yesterday and this morning with support of walker in the hallway but after movement, feels much more pain.    Objective:     Vitals:   Temp (24hrs), Av.9 °F (36.6 °C), Min:97.9 °F (36.6 °C), Max:97.9 °F (36.6 °C)    Temp:  [97.9 °F (36.6 °C)] 97.9 °F (36.6 °C)  HR:  [81-86] 86  Resp:  [17-20] 20  BP: ()/(61-92) 124/67  SpO2:  [90 %-96 %] 96 %  Body mass index is 44.46 kg/m².     Input and Output Summary (last 24 hours):     Intake/Output Summary (Last 24 hours) at 2024 1221  Last data filed at 2024 0850  Gross per 24 hour   Intake 502 ml   Output 1750 ml   Net -1248 ml       Physical Exam:   Physical Exam  Vitals and nursing note reviewed.   Constitutional:       Appearance: Normal appearance. She is obese. She is not ill-appearing or diaphoretic.   Eyes:      General: No scleral icterus.        Left eye: No discharge.      Conjunctiva/sclera: Conjunctivae normal.      Pupils: Pupils are equal, round, and reactive to light.   Cardiovascular:      Rate and Rhythm: Normal rate.      Heart sounds: No murmur heard.     No friction rub. No gallop.   Pulmonary:      Effort: Pulmonary effort is normal. No respiratory distress.      Breath sounds: No stridor. No wheezing or rhonchi.   Abdominal:      General: There is no distension.      Palpations: There is no mass.      Tenderness: There is no abdominal tenderness.      Hernia: No hernia is present.   Musculoskeletal:         General: Tenderness (lower back) present.   Skin:     Findings: No lesion or rash.   Neurological:      Mental Status: She is alert and oriented to person, place, and  time.      Cranial Nerves: No cranial nerve deficit.      Sensory: No sensory deficit.      Motor: No weakness.      Coordination: Coordination normal.          Additional Data:     Labs:  Results from last 7 days   Lab Units 08/02/24  0548   WBC Thousand/uL 2.62*   HEMOGLOBIN g/dL 10.7*   HEMATOCRIT % 33.8*   PLATELETS Thousands/uL 87*   SEGS PCT % 63   LYMPHO PCT % 25   MONO PCT % 7   EOS PCT % 4     Results from last 7 days   Lab Units 08/02/24  0548   SODIUM mmol/L 137   POTASSIUM mmol/L 3.6   CHLORIDE mmol/L 100   CO2 mmol/L 31   BUN mg/dL 10   CREATININE mg/dL 1.04   ANION GAP mmol/L 6   CALCIUM mg/dL 8.3*   ALBUMIN g/dL 3.2*   TOTAL BILIRUBIN mg/dL 0.95   ALK PHOS U/L 223*   ALT U/L 18   AST U/L 34   GLUCOSE RANDOM mg/dL 90     Results from last 7 days   Lab Units 07/30/24  1224   INR  1.24*             Results from last 7 days   Lab Units 07/30/24  1224   LACTIC ACID mmol/L 0.8       Lines/Drains:  Invasive Devices       Peripheral Intravenous Line  Duration             Peripheral IV 07/30/24 Left Antecubital 2 days                          Imaging: No pertinent imaging reviewed.    Recent Cultures (last 7 days):         Last 24 Hours Medication List:   Current Facility-Administered Medications   Medication Dose Route Frequency Provider Last Rate    acetaminophen  488 mg Oral Q6H PRN Ruslan Keyes MD      albuterol  2 puff Inhalation Q4H PRN Ruslan Keyes MD      amitriptyline  75 mg Oral HS Ruslan Keyes MD      Cholecalciferol  5,000 Units Oral Weekly Ruslan Keyes MD      dapsone  50 mg Oral Daily Ruslan Keyes MD      enoxaparin  40 mg Subcutaneous Q12H Ruslan Keyes MD      folic acid  1 mg Oral Daily Ruslan Keyes MD      gabapentin  200 mg Oral TID Ruslan Keyes MD      hydrocortisone   Topical BID Ruslan Keyes MD      HYDROmorphone  0.5 mg Intravenous Q3H PRN Ruslan Keyes MD      HYDROmorphone  1 mg Intravenous Q4H PRN Ruslan Keyes MD       hydroxychloroquine  200 mg Oral BID With Meals Ruslan Keyes MD      lactulose  20 g Oral 4x Daily Ruslan Keyes MD      lidocaine  1 patch Topical Daily Ruslan Keyes MD      naloxone  0.04 mg Intravenous Q1MIN PRN Ruslan Keyes MD      ondansetron  4 mg Intravenous Q6H PRN Ruslan Keyes MD      oxyCODONE  5 mg Oral Q4H PRN Ruslan Keyes MD      polyethylene glycol  17 g Oral Daily PRN Ruslan Keyes MD      sertraline  100 mg Oral Daily Ruslan Keyes MD      spironolactone  50 mg Oral Daily Ruslan Keyes MD      torsemide  20 mg Oral BID AC Ruslan Keyes MD          Today, Patient Was Seen By: Ruslan Keyes MD    **Please Note: This note may have been constructed using a voice recognition system.**

## 2024-08-02 NOTE — ASSESSMENT & PLAN NOTE
Imagin.  Recent subchondral fracture of the L1 superior endplate with mild loss of height. There is air density which is predominantly centered within the subchondral cleft, consistent with osteonecrosis/Kummel disease.   2.  Recent fracture of the lower sacral segment.     Ambulatory dysfunction due to intractable pain secondary to L1 superior endplate and lower sacral segment fracture  Continue pain management,   PT OT recommending acute rehab placement-due to high risk for fall and significant pain patient needs supervised physical therapy.  Patient had recent history of fall about 2 weeks ago and recently got discharged from this hospital.  Patient is still complaining of significant pain, pain medication adjusted, patient is still in significant amount of pain even the patient is walking with support of walker.  Reports after ambulation, feels more pain.  IR for possible kyphoplasty

## 2024-08-02 NOTE — PHYSICAL THERAPY NOTE
Physical Therapy Treatment     Patient Name: Bailey Olsen    Today's Date: 8/2/2024     Problem List  Principal Problem:    Intractable back pain  Active Problems:    Other cirrhosis of liver (HCC)    History of malignant carcinoid tumor of small intestine    Class 3 severe obesity due to excess calories in adult (HCC)    Sarcoidosis    Chronic diastolic congestive heart failure (HCC)    Ambulatory dysfunction       Past Medical History  Past Medical History:   Diagnosis Date    Carcinoid tumor     neuroendocrine    CHF (congestive heart failure) (HCC)     Cirrhosis of liver (HCC)       08/02/24 1130   PT Last Visit   PT Visit Date 08/02/24   Note Type   Note type   (Treatment)   Type of Brace   Brace Applied [place] TLSO   Patient Position When Brace Applied Seated   Bracing Recommendations   (Cues for donning and doffing brace)   End of Consult   Patient Position at End of Consult Supine   Pain Assessment   Pain Assessment Tool 0-10   Pain Score No Pain  (Pt has just recieved her pain medications)   Restrictions/Precautions   Braces or Orthoses TLSO   Other Precautions Fall Risk   Bed Mobility   Additional Comments Pt observed ambulating in her room without her TLSO on. Edcuated that she is to wear her TLSO for any OOB activity and when supine greater then 45 deg.   Transfers   Sit to Stand 5  Supervision   Stand to Sit 5  Supervision   Stand pivot 5  Supervision   Additional Comments RW   Ambulation/Elevation   Gait pattern Decreased hip extension;Decreased heel strike;Decreased toe off;Foward flexed   Gait Assistance 5  Supervision   Assistive Device Rolling walker   Distance 400'   Stair Management Assistance 5  Supervision   Stair Management Technique Two rails   Number of Stairs 4   Ambulation/Elevation Additional Comments RW for amb   Balance   Static Sitting Good   Dynamic Sitting Good   Static Standing Fair +   Dynamic Standing Fair +   Ambulatory  Fair   Endurance Deficit   Endurance Deficit Yes   Endurance Deficit Description limited OOB activity   Activity Tolerance   Activity Tolerance Patient limited by pain;Treatment limited secondary to medical complications (Comment)   Assessment   Prognosis Good   Problem List Decreased strength;Decreased range of motion;Decreased endurance;Impaired balance;Decreased mobility   Assessment Pt seen for PT treatment session this date with interventions consisting of gait training. Pt agreeable to PT treatment session upon arrival, pt found ambulating from BR, in no apparent distress. In comparison to previous session, pt with endurance, and overall functional activity tolerance, Recommend Level III (Minimum Resource Intensity) at time of d/c in order to maximize pt's functional independence and safety w/ mobility. Pt continues to be functioning below baseline level, and remains limited 2* factors listed above and including Limited functional activity tolerance secondary to vertebral fxs . PT will continue to see pt while here in order to address the deficits listed above and provide interventions consistent w/ POC in effort to achieve STGs.   Plan   Treatment/Interventions Functional transfer training;ADL retraining;LE strengthening/ROM;Elevations;Endurance training;Therapeutic exercise;Gait training;Bed mobility   PT Frequency 3-5x/wk   Discharge Recommendation   Rehab Resource Intensity Level, PT III (Minimum Resource Intensity)   AM-PAC Basic Mobility Inpatient   Turning in Flat Bed Without Bedrails 3   Lying on Back to Sitting on Edge of Flat Bed Without Bedrails 3   Moving Bed to Chair 3   Standing Up From Chair Using Arms 3   Walk in Room 3   Climb 3-5 Stairs With Railing 3   Basic Mobility Inpatient Raw Score 18   Basic Mobility Standardized Score 41.05   [facility] Highest Level Of Mobility   -HLM Goal 6: Walk 10 steps or more   -HLM Achieved 8: Walk 250 feet ot more        Past Surgical History  History  reviewed. No pertinent surgical history.   Patient supine in bed at the end of the session, all lines intact, all needs within reach. Patients raw score on the AM-PAC Basic Mobility inpatient short form is 18, standardized score is 41.05, (greater than) 42.9. patient's at this level are likely to benefit from D/C to home services, however, please refer to therapist recommendation for safe D/C Plan.

## 2024-08-02 NOTE — NURSING NOTE
"During administration of dilaudid for pain pt tried to administer own dose of dilaudid. pt tried to push in her dose of medication. Pt has previously made comments in regards to wanting to administer her own dilaudid. Pt educated regarding this. Stated she's been hospitalized so often she \"feels like she could do it\". Education again provided.   "

## 2024-08-02 NOTE — PLAN OF CARE
Problem: Potential for Falls  Goal: Patient will remain free of falls  Description: INTERVENTIONS:  - Educate patient/family on patient safety including physical limitations  - Instruct patient to call for assistance with activity   - Consult OT/PT to assist with strengthening/mobility   - Keep Call bell within reach  - Keep bed low and locked with side rails adjusted as appropriate  - Keep care items and personal belongings within reach  - Initiate and maintain comfort rounds  - Make Fall Risk Sign visible to staff  - Offer Toileting every    Hours, in advance of need  - Initiate/Maintain   alarm  - Obtain necessary fall risk management equipment:     - Apply yellow socks and bracelet for high fall risk patients  - Consider moving patient to room near nurses station  Outcome: Progressing     Problem: PAIN - ADULT  Goal: Verbalizes/displays adequate comfort level or baseline comfort level  Description: Interventions:  - Encourage patient to monitor pain and request assistance  - Assess pain using appropriate pain scale  - Administer analgesics based on type and severity of pain and evaluate response  - Implement non-pharmacological measures as appropriate and evaluate response  - Consider cultural and social influences on pain and pain management  - Notify physician/advanced practitioner if interventions unsuccessful or patient reports new pain  Outcome: Progressing     Problem: INFECTION - ADULT  Goal: Absence or prevention of progression during hospitalization  Description: INTERVENTIONS:  - Assess and monitor for signs and symptoms of infection  - Monitor lab/diagnostic results  - Monitor all insertion sites, i.e. indwelling lines, tubes, and drains  - Monitor endotracheal if appropriate and nasal secretions for changes in amount and color  - [place] appropriate cooling/warming therapies per order  - Administer medications as ordered  - Instruct and encourage patient and family to use good hand hygiene  technique  - Identify and instruct in appropriate isolation precautions for identified infection/condition  Outcome: Progressing  Goal: Absence of fever/infection during neutropenic period  Description: INTERVENTIONS:  - Monitor WBC    Outcome: Progressing     Problem: SAFETY ADULT  Goal: Patient will remain free of falls  Description: INTERVENTIONS:  - Educate patient/family on patient safety including physical limitations  - Instruct patient to call for assistance with activity   - Consult OT/PT to assist with strengthening/mobility   - Keep Call bell within reach  - Keep bed low and locked with side rails adjusted as appropriate  - Keep care items and personal belongings within reach  - Initiate and maintain comfort rounds  - Make Fall Risk Sign visible to staff  - Offer Toileting every    Hours, in advance of need  - Initiate/Maintain   alarm  - Obtain necessary fall risk management equipment:     - Apply yellow socks and bracelet for high fall risk patients  - Consider moving patient to room near nurses station  Outcome: Progressing  Goal: Maintain or return to baseline ADL function  Description: INTERVENTIONS:  -  Assess patient's ability to carry out ADLs; assess patient's baseline for ADL function and identify physical deficits which impact ability to perform ADLs (bathing, care of mouth/teeth, toileting, grooming, dressing, etc.)  - Assess/evaluate cause of self-care deficits   - Assess range of motion  - Assess patient's mobility; develop plan if impaired  - Assess patient's need for assistive devices and provide as appropriate  - Encourage maximum independence but intervene and supervise when necessary  - Involve family in performance of ADLs  - Assess for home care needs following discharge   - Consider OT consult to assist with ADL evaluation and planning for discharge  - Provide patient education as appropriate  Outcome: Progressing  Goal: Maintains/Returns to pre admission functional  level  Description: INTERVENTIONS:  - Perform AM-PAC 6 Click Basic Mobility/ Daily Activity assessment daily.  - Set and communicate daily mobility goal to care team and patient/family/caregiver.   - Collaborate with rehabilitation services on mobility goals if consulted  - Perform Range of Motion    times a day.  - Reposition patient every    hours.  - Dangle patient    times a day  - Stand patient    times a day  - Ambulate patient    times a day  - Out of bed to chair    times a day   - Out of bed for meals        times a day  - Out of bed for toileting  - Record patient progress and toleration of activity level   Outcome: Progressing     Problem: DISCHARGE PLANNING  Goal: Discharge to home or other facility with appropriate resources  Description: INTERVENTIONS:  - Identify barriers to discharge w/patient and caregiver  - Arrange for needed discharge resources and transportation as appropriate  - Identify discharge learning needs (meds, wound care, etc.)  - Arrange for interpretive services to assist at discharge as needed  - Refer to Case Management Department for coordinating discharge planning if the patient needs post-hospital services based on physician/advanced practitioner order or complex needs related to functional status, cognitive ability, or social support system  Outcome: Progressing     Problem: Knowledge Deficit  Goal: Patient/family/caregiver demonstrates understanding of disease process, treatment plan, medications, and discharge instructions  Description: Complete learning assessment and assess knowledge base.  Interventions:  - Provide teaching at level of understanding  - Provide teaching via preferred learning methods  Outcome: Progressing     Problem: Prexisting or High Potential for Compromised Skin Integrity  Goal: Skin integrity is maintained or improved  Description: INTERVENTIONS:  - Identify patients at risk for skin breakdown  - Assess and monitor skin integrity  - Assess and monitor  nutrition and hydration status  - Monitor labs   - Assess for incontinence   - Turn and reposition patient  - Assist with mobility/ambulation  - Relieve pressure over bony prominences  - Avoid friction and shearing  - Provide appropriate hygiene as needed including keeping skin clean and dry  - Evaluate need for skin moisturizer/barrier cream  - Collaborate with interdisciplinary team   - Patient/family teaching  - Consider wound care consult   Outcome: Progressing

## 2024-08-02 NOTE — PLAN OF CARE
Problem: PHYSICAL THERAPY ADULT  Goal: Performs mobility at highest level of function for planned discharge setting.  See evaluation for individualized goals.  Description: Treatment/Interventions: ADL retraining, Functional transfer training, LE strengthening/ROM, Elevations, Therapeutic exercise, Endurance training, Patient/family training, Equipment eval/education, Bed mobility, Gait training, Compensatory technique education, Spoke to nursing, Spoke to case management, OT          See flowsheet documentation for full assessment, interventions and recommendations.  Note: Prognosis: Good  Problem List: Decreased strength, Decreased range of motion, Decreased endurance, Impaired balance, Decreased mobility  Assessment: Pt seen for PT treatment session this date with interventions consisting of gait training. Pt agreeable to PT treatment session upon arrival, pt found ambulating from BR, in no apparent distress. In comparison to previous session, pt with endurance, and overall functional activity tolerance, Recommend Level III (Minimum Resource Intensity) at time of d/c in order to maximize pt's functional independence and safety w/ mobility. Pt continues to be functioning below baseline level, and remains limited 2* factors listed above and including Limited functional activity tolerance secondary to vertebral fxs . PT will continue to see pt while here in order to address the deficits listed above and provide interventions consistent w/ POC in effort to achieve STGs.  Barriers to Discharge: Decreased caregiver support, Inaccessible home environment  Barriers to Discharge Comments: requires assistance to complete mobility, has had multiple falls recently. increased pain, safety concerns with inc fall risk.  Rehab Resource Intensity Level, PT: III (Minimum Resource Intensity)    See flowsheet documentation for full assessment.

## 2024-08-02 NOTE — ASSESSMENT & PLAN NOTE
Wt Readings from Last 3 Encounters:   08/02/24 121 kg (267 lb 3.2 oz)   07/18/24 121 kg (266 lb)   07/08/24 121 kg (266 lb 12.8 oz)     Not in acute exacerbation,  Continue home medication, low-salt diet, fluid restriction , intake and output  Continue torsemide  2D echo done 05/2024 was normal EF.

## 2024-08-03 VITALS
DIASTOLIC BLOOD PRESSURE: 103 MMHG | RESPIRATION RATE: 18 BRPM | BODY MASS INDEX: 44.52 KG/M2 | HEART RATE: 67 BPM | OXYGEN SATURATION: 95 % | TEMPERATURE: 98.1 F | HEIGHT: 65 IN | WEIGHT: 267.2 LBS | SYSTOLIC BLOOD PRESSURE: 129 MMHG

## 2024-08-03 PROCEDURE — 99239 HOSP IP/OBS DSCHRG MGMT >30: CPT | Performed by: FAMILY MEDICINE

## 2024-08-03 RX ORDER — GABAPENTIN 100 MG/1
200 CAPSULE ORAL 3 TIMES DAILY
Qty: 180 CAPSULE | Refills: 0
Start: 2024-08-03 | End: 2024-09-02

## 2024-08-03 RX ORDER — LIDOCAINE 50 MG/G
1 PATCH TOPICAL DAILY
Qty: 9 PATCH | Refills: 0
Start: 2024-08-04

## 2024-08-03 RX ORDER — OXYCODONE HYDROCHLORIDE 10 MG/1
10 TABLET ORAL EVERY 6 HOURS PRN
Status: SHIPPED | OUTPATIENT
Start: 2024-08-03

## 2024-08-03 RX ADMIN — LACTULOSE 20 G: 10 SOLUTION ORAL at 09:24

## 2024-08-03 RX ADMIN — SERTRALINE HYDROCHLORIDE 100 MG: 25 TABLET ORAL at 09:24

## 2024-08-03 RX ADMIN — ACETAMINOPHEN 325MG 488 MG: 325 TABLET ORAL at 11:16

## 2024-08-03 RX ADMIN — OXYCODONE HYDROCHLORIDE 5 MG: 5 TABLET ORAL at 09:26

## 2024-08-03 RX ADMIN — HYDROXYCHLOROQUINE SULFATE 200 MG: 200 TABLET, FILM COATED ORAL at 09:23

## 2024-08-03 RX ADMIN — LIDOCAINE 5% 1 PATCH: 700 PATCH TOPICAL at 09:24

## 2024-08-03 RX ADMIN — GABAPENTIN 200 MG: 100 CAPSULE ORAL at 09:24

## 2024-08-03 RX ADMIN — DAPSONE 50 MG: 25 TABLET ORAL at 09:31

## 2024-08-03 RX ADMIN — OXYCODONE HYDROCHLORIDE 5 MG: 5 TABLET ORAL at 04:56

## 2024-08-03 RX ADMIN — ENOXAPARIN SODIUM 40 MG: 40 INJECTION SUBCUTANEOUS at 05:04

## 2024-08-03 RX ADMIN — ACETAMINOPHEN 325MG 488 MG: 325 TABLET ORAL at 04:56

## 2024-08-03 RX ADMIN — TORSEMIDE 20 MG: 20 TABLET ORAL at 09:23

## 2024-08-03 RX ADMIN — TIZANIDINE 4 MG: 2 TABLET ORAL at 09:23

## 2024-08-03 RX ADMIN — SPIRONOLACTONE 50 MG: 25 TABLET ORAL at 09:20

## 2024-08-03 RX ADMIN — FOLIC ACID 1 MG: 1 TABLET ORAL at 09:24

## 2024-08-03 NOTE — ASSESSMENT & PLAN NOTE
Imagin.  Recent subchondral fracture of the L1 superior endplate with mild loss of height. There is air density which is predominantly centered within the subchondral cleft, consistent with osteonecrosis/Kummel disease.   2.  Recent fracture of the lower sacral segment.     Ambulatory dysfunction due to intractable pain secondary to L1 superior endplate and lower sacral segment fracture  Continue pain management,   PT OT recommending acute rehab placement-due to high risk for fall and significant pain patient needs supervised physical therapy.  Patient had recent history of fall about 2 weeks ago and recently got discharged from this hospital.    With current pain management, patient condition significantly improved.  Patient is walking independently using back brace.  Since patient condition improved, patient will be discharged home with pain management with follow-up with PCP and neurosurgery outpatient basis.  Per chart review, patient has upcoming appointment with neurosurgery on 2024 at 10 AM.  Alarming signs and symptoms discussed with the ER precaution.  Patient verbalized understanding agrees.  Also updated patient's sister per patient's request about the treatment plan.  Per PDMP and pharmacy, patient recently filled up her prescription for oxycodone IR 10 mg every 6 hours for 30 days supply with 120 tablets.-No new prescription provided

## 2024-08-03 NOTE — DISCHARGE SUMMARY
[facility]  Discharge- Bailey Olsen 1970, 53 y.o. female MRN: [id number]  Unit/Bed#: -01 Encounter: [id number]  Primary Care Provider: Nayely Brown DO   Date and time admitted to hospital: 2024 12:01 PM    Ambulatory dysfunction  Assessment & Plan  Imagin.  Recent subchondral fracture of the L1 superior endplate with mild loss of height. There is air density which is predominantly centered within the subchondral cleft, consistent with osteonecrosis/Kummel disease.   2.  Recent fracture of the lower sacral segment.     Ambulatory dysfunction due to intractable pain secondary to L1 superior endplate and lower sacral segment fracture  Continue pain management,   With current pain management, patient condition significantly improved.  Patient is walking independently using back brace.  Since patient condition improved, patient will be discharged home with pain management with follow-up with PCP and neurosurgery outpatient basis.  Per chart review, patient has upcoming appointment with neurosurgery on 2024 at 10 AM.  Alarming signs and symptoms discussed with the ER precaution.  Patient verbalized understanding agrees.  Also updated patient's sister per patient's request about the treatment plan.  Reevaluation by PT OT seems significantly improvement.    * Intractable back pain  Assessment & Plan  Imagin.  Recent subchondral fracture of the L1 superior endplate with mild loss of height. There is air density which is predominantly centered within the subchondral cleft, consistent with osteonecrosis/Kummel disease.   2.  Recent fracture of the lower sacral segment.     Ambulatory dysfunction due to intractable pain secondary to L1 superior endplate and lower sacral segment fracture  Continue pain management,   PT OT recommending acute rehab placement-due to high risk for fall and significant pain patient needs supervised physical therapy.  Patient had  recent history of fall about 2 weeks ago and recently got discharged from this hospital.    With current pain management, patient condition significantly improved.  Patient is walking independently using back brace.  Since patient condition improved, patient will be discharged home with pain management with follow-up with PCP and neurosurgery outpatient basis.  Per chart review, patient has upcoming appointment with neurosurgery on 8/19/2024 at 10 AM.  Alarming signs and symptoms discussed with the ER precaution.  Patient verbalized understanding agrees.  Also updated patient's sister per patient's request about the treatment plan.  Per PDMP and pharmacy, patient recently filled up her prescription for oxycodone IR 10 mg every 6 hours for 30 days supply with 120 tablets.-No new prescription provided     Chronic diastolic congestive heart failure (HCC)  Assessment & Plan  Wt Readings from Last 3 Encounters:   08/02/24 121 kg (267 lb 3.2 oz)   07/18/24 121 kg (266 lb)   07/08/24 121 kg (266 lb 12.8 oz)     Not in acute exacerbation,  Continue home medication, low-salt diet, fluid restriction , intake and output  Continue torsemide  2D echo done 05/2024 was normal EF.           Sarcoidosis  Assessment & Plan  Being evaluated by pulmonology, continue home medication    Class 3 severe obesity due to excess calories in adult (HCC)  Assessment & Plan  Lifestyle modification.    History of malignant carcinoid tumor of small intestine  Assessment & Plan  History noted  Cont with routine outpatient follow up    Other cirrhosis of liver (HCC)  Assessment & Plan  Appears compensated  Secondary to sarcoidosis?  Continue with home lactulose, rifaximin, diuretics  Continue aggressive bowel regimen since patient did not have any bowel movement for the last 3 days also receiving narcotics for lower back injuries.        Medical Problems       Resolved Problems  Date Reviewed: 7/19/2024   None       Discharging Physician /  Practitioner: Ruslan Keyes MD  PCP: Nayely Brown DO  Admission Date:   Admission Orders (From admission, onward)       Ordered        08/01/24 1121  INPATIENT ADMISSION  Once            07/30/24 1528  Place in Observation  Once                          Discharge Date: 08/03/24    Consultations During Hospital Stay:  Neurosurgery, IR, PT/OT    Procedures Performed:   XR spine lumbar 2 or 3 views injury   Final Result by Sreekanth Alexander MD (07/31 1013)      Limited study. The L1 compression fracture is again seen and there seems to be some loss of stature of the L1 vertebral body compared with the prior CT.      The study was marked in EPIC for significant notification.         Computerized Assisted Algorithm (CAA) may have been used to analyze all applicable images.         Workstation performed: [id number]         CT abdomen pelvis with contrast   Final Result by Sonu Valencia MD (07/30 2449)      1.  Recent subchondral fracture of the L1 superior endplate with mild loss of height. There is air density which is predominantly centered within the subchondral cleft, consistent with osteonecrosis/Kummel disease.   2.  Recent fracture of the lower sacral segment.   3.  No acute visceral organ injury.   4.  Hepatic cirrhosis, portal hypertension, portosystemic collateral vessels. No ascites.   5.  Bibasilar groundglass opacity and interlobular septal thickening, right greater than left. In the acute setting, diagnostic considerations include pneumonitis and cardiogenic/noncardiogenic edema.   6.  Small right pleural effusion.      Workstation performed: [id number]         XR spine lumbar 2 or 3 views injury    (Results Pending)         Significant Findings / Test Results:   Lab Results   Component Value Date    WBC 2.62 (L) 08/02/2024    HGB 10.7 (L) 08/02/2024    HCT 33.8 (L) 08/02/2024     (H) 08/02/2024    PLT 87 (L) 08/02/2024     Lab Results   Component Value Date    SODIUM 137  08/02/2024    K 3.6 08/02/2024     08/02/2024    CO2 31 08/02/2024    AGAP 6 08/02/2024    BUN 10 08/02/2024    CREATININE 1.04 08/02/2024    GLUC 90 08/02/2024    CALCIUM 8.3 (L) 08/02/2024    AST 34 08/02/2024    ALT 18 08/02/2024    ALKPHOS 223 (H) 08/02/2024    TP 7.5 08/02/2024    TBILI 0.95 08/02/2024    EGFR 61 08/02/2024         Incidental Findings:   As mentioned above  I reviewed the above mentioned incidental findings with the patient and/or family and they expressed understanding.    Test Results Pending at Discharge (will require follow up):   none     Outpatient Tests Requested:  Will need repeat x-ray lumbosacral area after 2 weeks with neurosurgery/PCP    Complications:  none    Reason for Admission: Intractable lower back pain    Hospital Course:   Bailey Olsen is a 53 y.o. female patient who originally presented to the hospital on 7/30/2024 due to intractable lower back pain due to lumbar 1 and sacral bone fracture.  Patient placed on conservative management after discussion is done with neurosurgery with pain medications, activity as tolerated with LSO brace.  With the treatment, on initial stage of admission, patient pain remained uncontrolled, that point discussed with IR for possible kyphoplasty/sacroplasty.    At over the home with pain medications, patient condition significantly improved.  Initially PT OT recommending acute rehab placement, later on after on reevaluation, recommendation to home with home health aide since patient was able to participate with PT OT correctly, and patient also able to walk by herself using brace.    All lab results commending findings, treatment plan and option discussed in details with patient and upon patient's request with patient's sister over the phone.  Recommendation to follow-up with PCP and neurosurgery outpatient basis.  Alarming signs and symptoms discussed in details with patient with ER precaution.  Patient verbalized understanding  "agrees.    Please see above list of diagnoses and related plan for additional information.     Condition at Discharge: stable    Discharge Day Visit / Exam:   Subjective: Seen and evaluated during the run.  Lying on the bed.  Feels much comfortable still having pain but improves when she tried to use the brace.  Vitals: Blood Pressure: (!) 129/103 (08/03/24 0720)  Pulse: 67 (08/03/24 0720)  Temperature: 98.1 °F (36.7 °C) (08/03/24 0720)  Temp Source: Temporal (08/03/24 0720)  Respirations: 18 (08/03/24 0720)  Height: 5' 5\" (165.1 cm) (07/30/24 1202)  Weight - Scale: 121 kg (267 lb 3.2 oz) (08/02/24 0543)  SpO2: 95 % (08/03/24 0720)  Exam:   Physical Exam  Vitals and nursing note reviewed. Exam conducted with a chaperone present.   Constitutional:       Appearance: Normal appearance. She is obese. She is not ill-appearing or diaphoretic.   Eyes:      General: No scleral icterus.        Left eye: No discharge.      Extraocular Movements: Extraocular movements intact.      Conjunctiva/sclera: Conjunctivae normal.      Pupils: Pupils are equal, round, and reactive to light.   Cardiovascular:      Rate and Rhythm: Normal rate.      Heart sounds:      No friction rub. No gallop.   Pulmonary:      Effort: Pulmonary effort is normal. No respiratory distress.      Breath sounds: No stridor. No wheezing or rhonchi.   Abdominal:      General: There is no distension.      Palpations: There is no mass.      Tenderness: There is no abdominal tenderness.      Hernia: No hernia is present.   Musculoskeletal:         General: Tenderness (lower back) present.   Skin:     Findings: No lesion or rash.   Neurological:      Mental Status: She is alert and oriented to person, place, and time.      Cranial Nerves: No cranial nerve deficit.      Sensory: No sensory deficit.      Motor: No weakness.      Coordination: Coordination normal.          Discussion with Family: Updated  (sister) via phone.    Discharge " instructions/Information to patient and family:   See after visit summary for information provided to patient and family.      Provisions for Follow-Up Care:  See after visit summary for information related to follow-up care and any pertinent home health orders.      Mobility at time of Discharge:   Basic Mobility Inpatient Raw Score: 18  JH-HLM Goal: 6: Walk 10 steps or more  JH-HLM Achieved: 6: Walk 10 steps or more  HLM Goal achieved. Continue to encourage appropriate mobility.     Disposition:   Home with VNA Services (Reminder: Complete face to face encounter)    Planned Readmission: If condition get worse     Discharge Statement:  Greater than 50% of the total time was spent examining patient, answering all patient questions, arranging and discussing plan of care with patient as well as directly providing post-discharge instructions.  Additional time then spent on discharge activities.    Discharge Medications:  See after visit summary for reconciled discharge medications provided to patient and/or family.      **Please Note: This note may have been constructed using a voice recognition system**

## 2024-08-03 NOTE — ASSESSMENT & PLAN NOTE
Imagin.  Recent subchondral fracture of the L1 superior endplate with mild loss of height. There is air density which is predominantly centered within the subchondral cleft, consistent with osteonecrosis/Kummel disease.   2.  Recent fracture of the lower sacral segment.     Ambulatory dysfunction due to intractable pain secondary to L1 superior endplate and lower sacral segment fracture  Continue pain management,   PT OT recommending acute rehab placement-due to high risk for fall and significant pain patient needs supervised physical therapy.  Patient had recent history of fall about 2 weeks ago and recently got discharged from this hospital.    With current pain management, patient condition significantly improved.  Patient is walking independently using back brace.  Since patient condition improved, patient will be discharged home with pain management with follow-up with PCP and neurosurgery outpatient basis.  Per chart review, patient has upcoming appointment with neurosurgery on 2024 at 10 AM.  Alarming signs and symptoms discussed with the ER precaution.  Patient verbalized understanding agrees.  Also updated patient's sister per patient's request about the treatment plan.

## 2024-08-03 NOTE — PLAN OF CARE
Problem: Potential for Falls  Goal: Patient will remain free of falls  Description: INTERVENTIONS:  - Educate patient/family on patient safety including physical limitations  - Instruct patient to call for assistance with activity   - Consult OT/PT to assist with strengthening/mobility   - Keep Call bell within reach  - Keep bed low and locked with side rails adjusted as appropriate  - Keep care items and personal belongings within reach  - Initiate and maintain comfort rounds  - Make Fall Risk Sign visible to staff  - Offer Toileting every  Hours, in advance of need  - Initiate/Maintain alarm  - Obtain necessary fall risk management equipment:   - Apply yellow socks and bracelet for high fall risk patients  - Consider moving patient to room near nurses station  Outcome: Progressing     Problem: PAIN - ADULT  Goal: Verbalizes/displays adequate comfort level or baseline comfort level  Description: Interventions:  - Encourage patient to monitor pain and request assistance  - Assess pain using appropriate pain scale  - Administer analgesics based on type and severity of pain and evaluate response  - Implement non-pharmacological measures as appropriate and evaluate response  - Consider cultural and social influences on pain and pain management  - Notify physician/advanced practitioner if interventions unsuccessful or patient reports new pain  Outcome: Progressing     Problem: INFECTION - ADULT  Goal: Absence or prevention of progression during hospitalization  Description: INTERVENTIONS:  - Assess and monitor for signs and symptoms of infection  - Monitor lab/diagnostic results  - Monitor all insertion sites, i.e. indwelling lines, tubes, and drains  - Monitor endotracheal if appropriate and nasal secretions for changes in amount and color  - [place] appropriate cooling/warming therapies per order  - Administer medications as ordered  - Instruct and encourage patient and family to use good hand hygiene technique  -  Identify and instruct in appropriate isolation precautions for identified infection/condition  Outcome: Progressing  Goal: Absence of fever/infection during neutropenic period  Description: INTERVENTIONS:  - Monitor WBC    Outcome: Progressing     Problem: SAFETY ADULT  Goal: Patient will remain free of falls  Description: INTERVENTIONS:  - Educate patient/family on patient safety including physical limitations  - Instruct patient to call for assistance with activity   - Consult OT/PT to assist with strengthening/mobility   - Keep Call bell within reach  - Keep bed low and locked with side rails adjusted as appropriate  - Keep care items and personal belongings within reach  - Initiate and maintain comfort rounds  - Make Fall Risk Sign visible to staff  - Offer Toileting every  Hours, in advance of need  - Initiate/Maintain alarm  - Obtain necessary fall risk management equipment:   - Apply yellow socks and bracelet for high fall risk patients  - Consider moving patient to room near nurses station  Outcome: Progressing  Goal: Maintain or return to baseline ADL function  Description: INTERVENTIONS:  -  Assess patient's ability to carry out ADLs; assess patient's baseline for ADL function and identify physical deficits which impact ability to perform ADLs (bathing, care of mouth/teeth, toileting, grooming, dressing, etc.)  - Assess/evaluate cause of self-care deficits   - Assess range of motion  - Assess patient's mobility; develop plan if impaired  - Assess patient's need for assistive devices and provide as appropriate  - Encourage maximum independence but intervene and supervise when necessary  - Involve family in performance of ADLs  - Assess for home care needs following discharge   - Consider OT consult to assist with ADL evaluation and planning for discharge  - Provide patient education as appropriate  Outcome: Progressing  Goal: Maintains/Returns to pre admission functional level  Description:  INTERVENTIONS:  - Perform AM-PAC 6 Click Basic Mobility/ Daily Activity assessment daily.  - Set and communicate daily mobility goal to care team and patient/family/caregiver.   - Collaborate with rehabilitation services on mobility goals if consulted  - Perform Range of Motion  times a day.  - Reposition patient every  hours.  - Dangle patient  times a day  - Stand patient  times a day  - Ambulate patient times a day  - Out of bed to chair  times a day   - Out of bed for meals times a day  - Out of bed for toileting  - Record patient progress and toleration of activity level   Outcome: Progressing     Problem: DISCHARGE PLANNING  Goal: Discharge to home or other facility with appropriate resources  Description: INTERVENTIONS:  - Identify barriers to discharge w/patient and caregiver  - Arrange for needed discharge resources and transportation as appropriate  - Identify discharge learning needs (meds, wound care, etc.)  - Arrange for interpretive services to assist at discharge as needed  - Refer to Case Management Department for coordinating discharge planning if the patient needs post-hospital services based on physician/advanced practitioner order or complex needs related to functional status, cognitive ability, or social support system  Outcome: Progressing     Problem: Knowledge Deficit  Goal: Patient/family/caregiver demonstrates understanding of disease process, treatment plan, medications, and discharge instructions  Description: Complete learning assessment and assess knowledge base.  Interventions:  - Provide teaching at level of understanding  - Provide teaching via preferred learning methods  Outcome: Progressing     Problem: Prexisting or High Potential for Compromised Skin Integrity  Goal: Skin integrity is maintained or improved  Description: INTERVENTIONS:  - Identify patients at risk for skin breakdown  - Assess and monitor skin integrity  - Assess and monitor nutrition and hydration status  -  Monitor labs   - Assess for incontinence   - Turn and reposition patient  - Assist with mobility/ambulation  - Relieve pressure over bony prominences  - Avoid friction and shearing  - Provide appropriate hygiene as needed including keeping skin clean and dry  - Evaluate need for skin moisturizer/barrier cream  - Collaborate with interdisciplinary team   - Patient/family teaching  - Consider wound care consult   Outcome: Progressing

## 2024-08-03 NOTE — CASE MANAGEMENT
Case Management Discharge Planning Note    Patient name Bailey Olsen  Location /-01 MRN [id number]  : 1970 Date 8/3/2024       Current Admission Date: 2024  Current Admission Diagnosis:Intractable back pain   Patient Active Problem List    Diagnosis Date Noted Date Diagnosed    Ambulatory dysfunction 2024     Intractable back pain 2024     Hyperammonemia (HCC) 2024     Hepatic encephalopathy (HCC) 2024     Fall 2024     Other cirrhosis of liver (HCC) 2024     History of malignant carcinoid tumor of small intestine 2024     Class 3 severe obesity due to excess calories in adult (HCC) 2024     Primary hypertension 2024     Anxiety 2024     Hypokalemia 2024     Fibromyalgia 2024     Iron deficiency anemia 2024     Pancytopenia (HCC) 2024     Sarcoidosis 2024     Chronic diastolic congestive heart failure (HCC) 2024     Bilateral pulmonary infiltrates 2024       LOS (days): 2  Geometric Mean LOS (GMLOS) (days): 2.9  Days to GMLOS:1     OBJECTIVE:  Risk of Unplanned Readmission Score: 30.33         Current admission status: Inpatient   Preferred Pharmacy:   [facility] [address] [place] [address] [place] - [address] [facility]  [address] [facility]   Phone: [phone] Fax: [phone]    Primary Care Provider: Nayely Brown DO    Primary Insurance: [facility]  Secondary Insurance:     DISCHARGE DETAILS:              [facility] accepted pt at time of discharge

## 2024-08-03 NOTE — INCIDENTAL FINDINGS
The following findings require follow up:  Radiographic finding   Finding: XR spine lumbar 2 or 3 views injury: Limited study. The L1 compression fracture is again seen and there seems to be some loss of stature of the L1 vertebral body compared with the prior CT    Follow up required: yes   Follow up should be done within 1 week(s)    Please notify the following clinician to assist with the follow up:   [facility] [place]  Neurosurgery [phone] [facility] [address]                        Nayely Brown DO PCP - [facility] [phone] [facility]-[address] [address]     Next Steps: Call today  Instructions: Please call the doctor's office in 1 to 2 days to make an appointment as soon as possible.       Incidental finding results were discussed with the Patient by Ruslan Keyes MD on 08/03/24.   They expressed understanding and all questions answered.

## 2024-08-03 NOTE — ASSESSMENT & PLAN NOTE
Imagin.  Recent subchondral fracture of the L1 superior endplate with mild loss of height. There is air density which is predominantly centered within the subchondral cleft, consistent with osteonecrosis/Kummel disease.   2.  Recent fracture of the lower sacral segment.     Ambulatory dysfunction due to intractable pain secondary to L1 superior endplate and lower sacral segment fracture  Continue pain management,   With current pain management, patient condition significantly improved.  Patient is walking independently using back brace.  Since patient condition improved, patient will be discharged home with pain management with follow-up with PCP and neurosurgery outpatient basis.  Per chart review, patient has upcoming appointment with neurosurgery on 2024 at 10 AM.  Alarming signs and symptoms discussed with the ER precaution.  Patient verbalized understanding agrees.  Also updated patient's sister per patient's request about the treatment plan.  Reevaluation by PT OT seems significantly improvement.

## 2024-08-03 NOTE — PLAN OF CARE
Problem: INFECTION - ADULT  Goal: Absence or prevention of progression during hospitalization  Description: INTERVENTIONS:  - Assess and monitor for signs and symptoms of infection  - Monitor lab/diagnostic results  - Monitor all insertion sites, i.e. indwelling lines, tubes, and drains  - Monitor endotracheal if appropriate and nasal secretions for changes in amount and color  - [place] appropriate cooling/warming therapies per order  - Administer medications as ordered  - Instruct and encourage patient and family to use good hand hygiene technique  - Identify and instruct in appropriate isolation precautions for identified infection/condition  Outcome: Progressing     Problem: Knowledge Deficit  Goal: Patient/family/caregiver demonstrates understanding of disease process, treatment plan, medications, and discharge instructions  Description: Complete learning assessment and assess knowledge base.  Interventions:  - Provide teaching at level of understanding  - Provide teaching via preferred learning methods  Outcome: Progressing

## 2024-08-03 NOTE — PROGRESS NOTES
Patient:    MRN:  [id number]    Sindy Request ID:  [id number]    Level of care reserved:  Home Health Agency    Partner Reserved:  [facility], [facility] [place], [place] [id number] [phone]    Clinical needs requested:    Geography searched:  [id number]    Start of Service:    Request sent:  3:32pm EDT on 8/2/2024 by Gabriella Kinney    Partner reserved:  9:41am EDT on 8/3/2024 by Marizol Jensen    Choice list shared:

## 2024-08-05 NOTE — UTILIZATION REVIEW
NOTIFICATION OF ADMISSION DISCHARGE   This is a Notification of Discharge from [facility]. Please be advised that this patient has been discharge from our facility. Below you will find the admission and discharge date and time including the patient’s disposition.   UTILIZATION REVIEW CONTACT:  Елена Self  Utilization   Network Utilization Review Department  Phone: [phone] x carefully listen to the prompts. All voicemails are confidential.  Email: [email or web address]     ADMISSION INFORMATION  PRESENTATION DATE: 7/30/2024 12:01 PM  OBERVATION ADMISSION DATE: 07/30/2024 1528  INPATIENT ADMISSION DATE: 8/1/24 11:21 AM   DISCHARGE DATE: 8/3/2024  2:07 PM   DISPOSITION:Home with Home Health Care    Network Utilization Review Department  ATTENTION: Please call with any questions or concerns to [phone] and carefully listen to the prompts so that you are directed to the right person. All voicemails are confidential.   For Discharge needs, contact Care Management DC Support Team at [phone] opt. 2  Send all requests for admission clinical reviews, approved or denied determinations and any other requests to dedicated fax number below belonging to the campus where the patient is receiving treatment. List of dedicated fax numbers for the Facilities:  FACILITY NAME UR FAX NUMBER   ADMISSION DENIALS (Administrative/Medical Necessity) [phone]   DISCHARGE SUPPORT TEAM ([facility]) [phone]   PARENT CHILD HEALTH (Maternity/NICU/Pediatrics) [phone]   [facility] [phone]   [facility] [phone]   [facility] [phone]   [facility] [phone]   [facility] [phone]   [facility] [phone]   [facility] [phone]   [facility]  [facility] [phone]   [facility] [phone]   [facility] [phone]   [facility] [phone]   [facility] [phone]

## 2024-08-17 ENCOUNTER — APPOINTMENT (OUTPATIENT)
Dept: RADIOLOGY | Facility: CLINIC | Age: 54
End: 2024-08-17
Payer: COMMERCIAL

## 2024-08-17 DIAGNOSIS — S32.010A CLOSED COMPRESSION FRACTURE OF BODY OF L1 VERTEBRA (HCC): ICD-10-CM

## 2024-08-17 PROCEDURE — 72100 X-RAY EXAM L-S SPINE 2/3 VWS: CPT

## 2024-08-19 ENCOUNTER — OFFICE VISIT (OUTPATIENT)
Age: 54
End: 2024-08-19
Payer: COMMERCIAL

## 2024-08-19 VITALS
SYSTOLIC BLOOD PRESSURE: 138 MMHG | HEART RATE: 92 BPM | DIASTOLIC BLOOD PRESSURE: 90 MMHG | WEIGHT: 267 LBS | BODY MASS INDEX: 44.48 KG/M2 | HEIGHT: 65 IN | TEMPERATURE: 98.3 F | OXYGEN SATURATION: 92 % | RESPIRATION RATE: 16 BRPM

## 2024-08-19 DIAGNOSIS — S32.011D CLOSED STABLE BURST FRACTURE OF FIRST LUMBAR VERTEBRA WITH ROUTINE HEALING: Primary | ICD-10-CM

## 2024-08-19 DIAGNOSIS — S30.1XXA HEMATOMA OF GROIN, INITIAL ENCOUNTER: ICD-10-CM

## 2024-08-19 DIAGNOSIS — S32.010A CLOSED COMPRESSION FRACTURE OF BODY OF L1 VERTEBRA (HCC): ICD-10-CM

## 2024-08-19 DIAGNOSIS — S32.10XA CLOSED FRACTURE OF SACRUM, UNSPECIFIED FRACTURE MORPHOLOGY, INITIAL ENCOUNTER (HCC): ICD-10-CM

## 2024-08-19 PROCEDURE — 99203 OFFICE O/P NEW LOW 30 MIN: CPT | Performed by: PHYSICIAN ASSISTANT

## 2024-08-19 NOTE — PROGRESS NOTES
Ambulatory Visit  Name: Bailey Olsen      : 1970      MRN: [id number]  Encounter Provider: Clarice Velarde PA-C  Encounter Date: 2024   [facility]    Assessment & Plan   1. Closed stable burst fracture of first lumbar vertebra with routine healing  Assessment & Plan:  Patient presents today for 2 weeks follow-up of L1 fracture - TLICS 2  S/p mechanical fall . States gets dizzy at times and lead to fall strikign her back on a cedar dresser than landing on her buttock.   Continues with low back pain including buttock with radiation to bilateral hips and left thigh. Pain described as constant dull with exacerbation at times to 9/10.     Imaging personally reviewed:   CT recon T/L spine 24: There is presence of a small lucency line through the superior endplate without involvement of the posterior elements.   Lumbar spine XR 2024: limited. Some increased SHABNAM compared to CT scan. Acceptable alignment.   Lumbar spine XR 24: Final report pending. Stable fracture and alignment    Plan:   Continue to monitor symptoms.   CT as well as serial Xrays reviewed in detail with patient, her sister and brother in law (retired chiropractor) demonstrating above findings.   Given leg symptoms, MRI lumbar spine ordered to look for any nerve etiology as well as further evaluate the fracture for healing and consideration for kyphoplasty.   DEXA scan ordered for evaluation of bony quality.   Continue pain medications as prescribed.   Continue use of TLSO brace when OOB.   Continue limited activities to include no bending/twisting or heavy lifting.   Reviewed fracture can take 6-12 weeks to heal.   Reviewed Kyphoplasty indication and will further eval after additional imaging and monitoring of her progress.   Plan for outpatient follow-up after MRI imaging.   Plan for follow-up in 4 weeks with repeat upright Xrays.   Patient aware to call the  office with any questions/concerns or new/progressive symptoms.     2. Closed fracture of sacrum, unspecified fracture morphology, initial encounter ([facility])  -     DXA bone density spine hip and pelvis; Future; Expected date: 08/19/2024  3. Closed compression fracture of body of L1 vertebra ([facility])  -     Ambulatory Referral to Neurosurgery  -     MRI lumbar spine without contrast; Future; Expected date: 08/19/2024  -     X-ray lumbar spine 2 or 3 views; Future; Expected date: 09/19/2024      History of Present Illness       This is a 54 y/o female with past medical history significant for morbid obesity, liver cirrhosis, congestive heart failure, neuroendocrine tumor of small intestines, hypertension, fibromyalgia, anemia and sarcoidosis who presents today for evaluation of an L1 fracture.  Patient sustained a mechanical fall on July 18 following which she states mild dizziness striking her back on a seat or other more and then landing on her buttock.   Patient states at first she had numbness of her bilateral legs as well as episodes of urinary incontinence which resolved at this time.  Patient had significant pain and was evaluated in the emergency room via CT scan which was reported negative.  She was discharged home and continued with significant pain and returned again on July 31 when upright x-rays demonstrated new loss of height at L1.  Patient was treated conservatively in a TLSO brace, activity restrictions and medications.  She presents today for a 2-week hospital follow-up with updated lumbar spine x-rays.    Today, patient continues with low back pain as well as coccyx pain rated 9 out of 10 and exacerbated with sitting for prolonged period of time and activities.  She describes radiating pain to bilateral hips and left anterior thigh which is a constant dull pain but at times is exacerbated with sharp stabbing pain.  She denies any bowel or bladder incontinence at this time.  No saddle numbness.  No  numbness in legs. No focal weakness. States she does wear her brace most of the time but at times it causes discomfort when she removes it.      Patient also with left groin hematoma which she states has grown over the last several weeks.         Review of Systems   Constitutional:  Positive for activity change.   Genitourinary: Negative.  Negative for difficulty urinating.   Musculoskeletal:  Positive for back pain (LBP radiating into buttock/hiips down legs to knees, left is worse), gait problem (On & off, Hx of) and myalgias (spams/crappng in lower back/ left thigh).        Wearing TLSO brace   Neurological:  Positive for numbness (N&T in feet).     ROS obtained by MA and reviewed.  See HPI.    Past Medical History   Past Medical History:   Diagnosis Date    Carcinoid tumor     neuroendocrine    CHF (congestive heart failure) (HCC)     Cirrhosis of liver (HCC)      No past surgical history on file.  Family History   Problem Relation Age of Onset    Hypertension Father      Current Outpatient Medications on File Prior to Visit   Medication Sig Dispense Refill    amitriptyline (ELAVIL) 75 mg tablet Take 1 tablet (75 mg total) by mouth daily at bedtime 30 tablet 0    Cholecalciferol (Vitamin D-3) 125 MCG (5000 UT) TABS Take 1 tablet by mouth once a week Tuesday      folic acid (FOLVITE) 1 mg tablet Take 1 mg by mouth daily      gabapentin (NEURONTIN) 100 mg capsule Take 2 capsules (200 mg total) by mouth 3 (three) times a day 180 capsule 0    hydroxychloroquine (PLAQUENIL) 200 mg tablet Take 200 mg by mouth 2 (two) times a day with meals      lactulose (CHRONULAC) 10 g/15 mL solution Take 30 mL (20 g total) by mouth 4 (four) times a day Goal bowel movements is 4 a day 3600 mL 0    lidocaine (LIDODERM) 5 % Apply 1 patch topically over 12 hours daily Remove & Discard patch within 12 hours or as directed by MD [place] patch 0    oxyCODONE (ROXICODONE) 10 MG TABS Take 1 tablet (10 mg total) by mouth every 6 (six) hours as  needed for moderate pain Max Daily Amount: 40 mg      polyethylene glycol (MIRALAX) 17 g packet Take 17 g by mouth if needed (constipation) for up to 10 days 10 each 0    sertraline (ZOLOFT) 100 mg tablet Take 1 tablet (100 mg total) by mouth daily 30 tablet 0    spironolactone (ALDACTONE) 50 mg tablet Take 1 tablet (50 mg total) by mouth daily 30 tablet 0    torsemide (DEMADEX) 20 mg tablet Take 1 tablet (20 mg total) by mouth 2 (two) times a day before meals 60 tablet 0    tiZANidine (ZANAFLEX) 4 mg tablet Take 1 tablet (4 mg total) by mouth 3 (three) times a day (Patient not taking: Reported on 8/19/2024) 90 tablet 0     No current facility-administered medications on file prior to visit.     Allergies   Allergen Reactions    Zolpidem GI Intolerance, Other (See Comments) and Hives     Other Reaction(s): Other      Blacks out    Blacks out    Tapentadol Sneezing and Other (See Comments)     Zoned out    Alprazolam Other (See Comments)     Other Reaction(s): Other      Feels zoned out    Feels zoned out    Amoxicillin-Pot Clavulanate Hives and Nausea Only     Amoxil fine    Morphine Nausea Only and Headache    Tramadol GI Intolerance    Azelastine Rash    Azelastine Hcl Rash    Molds & Smuts Rash     Other Reaction(s): Unknown      Current Outpatient Medications on File Prior to Visit   Medication Sig Dispense Refill    amitriptyline (ELAVIL) 75 mg tablet Take 1 tablet (75 mg total) by mouth daily at bedtime 30 tablet 0    Cholecalciferol (Vitamin D-3) 125 MCG (5000 UT) TABS Take 1 tablet by mouth once a week Tuesday      folic acid (FOLVITE) 1 mg tablet Take 1 mg by mouth daily      gabapentin (NEURONTIN) 100 mg capsule Take 2 capsules (200 mg total) by mouth 3 (three) times a day 180 capsule 0    hydroxychloroquine (PLAQUENIL) 200 mg tablet Take 200 mg by mouth 2 (two) times a day with meals      lactulose (CHRONULAC) 10 g/15 mL solution Take 30 mL (20 g total) by mouth 4 (four) times a day Goal bowel movements  "is 4 a day 3600 mL 0    lidocaine (LIDODERM) 5 % Apply 1 patch topically over 12 hours daily Remove & Discard patch within 12 hours or as directed by MD 9 patch 0    oxyCODONE (ROXICODONE) 10 MG TABS Take 1 tablet (10 mg total) by mouth every 6 (six) hours as needed for moderate pain Max Daily Amount: 40 mg      polyethylene glycol (MIRALAX) 17 g packet Take 17 g by mouth if needed (constipation) for up to 10 days 10 each 0    sertraline (ZOLOFT) 100 mg tablet Take 1 tablet (100 mg total) by mouth daily 30 tablet 0    spironolactone (ALDACTONE) 50 mg tablet Take 1 tablet (50 mg total) by mouth daily 30 tablet 0    torsemide (DEMADEX) 20 mg tablet Take 1 tablet (20 mg total) by mouth 2 (two) times a day before meals 60 tablet 0    tiZANidine (ZANAFLEX) 4 mg tablet Take 1 tablet (4 mg total) by mouth 3 (three) times a day (Patient not taking: Reported on 8/19/2024) 90 tablet 0     No current facility-administered medications on file prior to visit.      Social History     Tobacco Use    Smoking status: Never    Smokeless tobacco: Never   Vaping Use    Vaping status: Never Used   Substance and Sexual Activity    Alcohol use: Never    Drug use: Never    Sexual activity: Not Currently     Objective     /90 (BP Location: Left arm)   Pulse 92   Temp 98.3 °F (36.8 °C) (Temporal)   Resp 16   Ht 5' 5\" (1.651 m)   Wt 121 kg (267 lb)   SpO2 92%   BMI 44.43 kg/m²     Physical Exam  Constitutional:       General: She is not in acute distress.     Appearance: Normal appearance. She is well-developed. She is not ill-appearing.   HENT:      Head: Normocephalic and atraumatic.      Right Ear: External ear normal.      Left Ear: External ear normal.      Nose: Nose normal.      Mouth/Throat:      Mouth: Mucous membranes are moist.   Eyes:      General: No scleral icterus.        Right eye: No discharge.         Left eye: No discharge.      Conjunctiva/sclera: Conjunctivae normal.   Cardiovascular:      Rate and Rhythm: " Normal rate.   Pulmonary:      Effort: Pulmonary effort is normal. No respiratory distress.   Musculoskeletal:         General: Tenderness (Diffuse tenderness to palpation throughout thoracolumbar spine and sacrum.) present.   Skin:     General: Skin is warm and dry.   Neurological:      Mental Status: She is alert.      Deep Tendon Reflexes:      Reflex Scores:       Patellar reflexes are 2+ on the right side and 2+ on the left side.  Psychiatric:         Speech: Speech normal.         Thought Content: Thought content normal.         Judgment: Judgment normal.       Neurologic Exam     Mental Status   Follows 3 step commands.   Attention: decreased. Concentration: decreased.   Speech: speech is normal   Level of consciousness: alert  Knowledge: good.   Normal comprehension.   Pain requiring her to change position     Cranial Nerves     CN III, IV, VI   Conjugate gaze: present    CN V   Facial sensation intact.     CN VII   Facial expression full, symmetric.     CN VIII   Hearing: intact    CN XI   Right trapezius strength: normal  Left trapezius strength: normal    Motor Exam     Strength   Strength 5/5 except as noted. Right HF 4/5      Sensory Exam   Light touch normal.     Gait, Coordination, and Reflexes     Tremor   Resting tremor: absent  Intention tremor: absent  Action tremor: absent    Reflexes   Right patellar: 2+  Left patellar: 2+  Right ankle clonus: absent  Left ankle clonus: absent      Administrative Statements   I have spent a total time of 38 minutes in caring for this patient on the day of the visit/encounter including Diagnostic results, Risks and benefits of tx options, Instructions for management, Importance of tx compliance, Impressions, Documenting in the medical record, Reviewing / ordering tests, medicine, procedures  , and Obtaining or reviewing history  .

## 2024-08-19 NOTE — ASSESSMENT & PLAN NOTE
States from [facility] and expanded.    Noted TTP  Requested patient to lynn and if it grows to call PCP for further eval

## 2024-08-19 NOTE — ASSESSMENT & PLAN NOTE
Patient presents today for 2 weeks follow-up of L1 fracture - TLICS 2  S/p mechanical fall 7/18. States gets dizzy at times and lead to fall strikign her back on a cedar dresser than landing on her buttock.   Continues with low back pain including buttock with radiation to bilateral hips and left thigh. Pain described as constant dull with exacerbation at times to 9/10.     Imaging personally reviewed:   CT recon T/L spine 7/18/24: There is presence of a small lucency line through the superior endplate without involvement of the posterior elements.   Lumbar spine XR 7/31/2024: limited. Some increased SHABNAM compared to CT scan. Acceptable alignment.   Lumbar spine XR 8/17/24: Final report pending. Stable fracture and alignment    Plan:   Continue to monitor symptoms.   CT as well as serial Xrays reviewed in detail with patient, her sister and brother in law (retired chiropractor) demonstrating above findings.   Given leg symptoms, MRI lumbar spine ordered to look for any nerve etiology as well as further evaluate the fracture for healing and consideration for kyphoplasty.   DEXA scan ordered for evaluation of bony quality.   Continue pain medications as prescribed.   Continue use of TLSO brace when OOB.   Continue limited activities to include no bending/twisting or heavy lifting.   Reviewed fracture can take 6-12 weeks to heal.   Reviewed Kyphoplasty indication and will further eval after additional imaging and monitoring of her progress.   Plan for outpatient follow-up after MRI imaging.   Plan for follow-up in 4 weeks with repeat upright Xrays.   Patient aware to call the office with any questions/concerns or new/progressive symptoms.

## 2024-08-22 ENCOUNTER — TELEPHONE (OUTPATIENT)
Age: 54
End: 2024-08-22

## 2024-08-22 NOTE — TELEPHONE ENCOUNTER
08/22/2024- CALLED PT AND LEFT MESSAGE ON MACHINE TO SCHEDULE A F/U W/ 09/03/2024 DXA SCAN AND 09/13/2024 MRI LUMBAR.

## 2024-09-08 ENCOUNTER — APPOINTMENT (EMERGENCY)
Dept: CT IMAGING | Facility: HOSPITAL | Age: 54
DRG: 441 | End: 2024-09-08
Payer: COMMERCIAL

## 2024-09-08 ENCOUNTER — HOSPITAL ENCOUNTER (INPATIENT)
Facility: HOSPITAL | Age: 54
LOS: 3 days | Discharge: HOME | DRG: 441 | End: 2024-09-11
Attending: EMERGENCY MEDICINE | Admitting: FAMILY MEDICINE
Payer: COMMERCIAL

## 2024-09-08 ENCOUNTER — APPOINTMENT (INPATIENT)
Dept: RADIOLOGY | Facility: HOSPITAL | Age: 54
DRG: 441 | End: 2024-09-08
Payer: COMMERCIAL

## 2024-09-08 ENCOUNTER — APPOINTMENT (EMERGENCY)
Dept: RADIOLOGY | Facility: HOSPITAL | Age: 54
DRG: 441 | End: 2024-09-08
Payer: COMMERCIAL

## 2024-09-08 DIAGNOSIS — S32.010A CLOSED COMPRESSION FRACTURE OF BODY OF L1 VERTEBRA (HCC): ICD-10-CM

## 2024-09-08 DIAGNOSIS — K76.82 HEPATIC ENCEPHALOPATHY (HCC): Primary | ICD-10-CM

## 2024-09-08 DIAGNOSIS — E87.6 HYPOKALEMIA: ICD-10-CM
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
## 2024-09-08 PROBLEM — S32.010S CLOSED COMPRESSION FRACTURE OF L1 LUMBAR VERTEBRA, SEQUELA: Status: ACTIVE | Noted: 2024-09-08

## 2024-09-08 PROBLEM — I50.32 CHRONIC DIASTOLIC CONGESTIVE HEART FAILURE (HCC): Status: RESOLVED | Noted: 2024-05-26 | Resolved: 2024-09-08

## 2024-09-08 LAB
ALBUMIN SERPL BCG-MCNC: 3.3 G/DL (ref 3.5–5)
ALP SERPL-CCNC: 198 U/L (ref 34–104)
ALT SERPL W P-5'-P-CCNC: 17 U/L (ref 7–52)
AMMONIA PLAS-SCNC: 166 UMOL/L (ref 18–72)
ANION GAP SERPL CALCULATED.3IONS-SCNC: 7 MMOL/L (ref 4–13)
APTT PPP: 33 SECONDS (ref 23–34)
ARTERIAL PATENCY WRIST A: YES
AST SERPL W P-5'-P-CCNC: 47 U/L (ref 13–39)
BASE EXCESS BLDA CALC-SCNC: 9.9 MMOL/L
BASOPHILS # BLD MANUAL: 0.03 THOUSAND/UL (ref 0–0.1)
BASOPHILS NFR MAR MANUAL: 1 % (ref 0–1)
BILIRUB SERPL-MCNC: 1.09 MG/DL (ref 0.2–1)
BUN SERPL-MCNC: 12 MG/DL (ref 5–25)
CALCIUM ALBUM COR SERPL-MCNC: 9.2 MG/DL (ref 8.3–10.1)
CALCIUM SERPL-MCNC: 8.6 MG/DL (ref 8.4–10.2)
CARDIAC TROPONIN I PNL SERPL HS: 5 NG/L
CHLORIDE SERPL-SCNC: 99 MMOL/L (ref 96–108)
CO2 SERPL-SCNC: 32 MMOL/L (ref 21–32)
CREAT SERPL-MCNC: 1.06 MG/DL (ref 0.6–1.3)
EOSINOPHIL # BLD MANUAL: 0.09 THOUSAND/UL (ref 0–0.4)
EOSINOPHIL NFR BLD MANUAL: 3 % (ref 0–6)
ERYTHROCYTE [DISTWIDTH] IN BLOOD BY AUTOMATED COUNT: 14.4 % (ref 11.6–15.1)
FLUAV RNA RESP QL NAA+PROBE: NEGATIVE
FLUBV RNA RESP QL NAA+PROBE: NEGATIVE
GFR SERPL CREATININE-BSD FRML MDRD: 59 ML/MIN/1.73SQ M
GLUCOSE SERPL-MCNC: 113 MG/DL (ref 65–140)
GLUCOSE SERPL-MCNC: 76 MG/DL (ref 65–140)
HCO3 BLDA-SCNC: 35.7 MMOL/L (ref 22–28)
HCT VFR BLD AUTO: 32.5 % (ref 34.8–46.1)
HGB BLD-MCNC: 10.3 G/DL (ref 11.5–15.4)
INR PPP: 1.25 (ref 0.85–1.19)
LIPASE SERPL-CCNC: 7 U/L (ref 11–82)
LYMPHOCYTES # BLD AUTO: 0.74 THOUSAND/UL (ref 0.6–4.47)
LYMPHOCYTES # BLD AUTO: 26 % (ref 14–44)
MAGNESIUM SERPL-MCNC: 2 MG/DL (ref 1.9–2.7)
MCH RBC QN AUTO: 31.4 PG (ref 26.8–34.3)
MCHC RBC AUTO-ENTMCNC: 31.7 G/DL (ref 31.4–37.4)
MCV RBC AUTO: 99 FL (ref 82–98)
MONOCYTES # BLD AUTO: 0.14 THOUSAND/UL (ref 0–1.22)
MONOCYTES NFR BLD: 5 % (ref 4–12)
NASAL CANNULA: ABNORMAL
NEUTROPHILS # BLD MANUAL: 1.85 THOUSAND/UL (ref 1.85–7.62)
NEUTS BAND NFR BLD MANUAL: 2 % (ref 0–8)
NEUTS SEG NFR BLD AUTO: 63 % (ref 43–75)
O2 CT BLDA-SCNC: 13.9 ML/DL (ref 16–23)
OVALOCYTES BLD QL SMEAR: PRESENT
OXYHGB MFR BLDA: 92.6 % (ref 94–97)
PCO2 BLDA: 54.5 MM HG (ref 36–44)
PH BLDA: 7.43 [PH] (ref 7.35–7.45)
PLATELET # BLD AUTO: 66 THOUSANDS/UL (ref 149–390)
PLATELET BLD QL SMEAR: ABNORMAL
PMV BLD AUTO: 10.2 FL (ref 8.9–12.7)
PO2 BLDA: 71.2 MM HG (ref 75–129)
POTASSIUM SERPL-SCNC: 3.3 MMOL/L (ref 3.5–5.3)
PROT SERPL-MCNC: 7.3 G/DL (ref 6.4–8.4)
PROTHROMBIN TIME: 16 SECONDS (ref 12.3–15)
RBC # BLD AUTO: 3.28 MILLION/UL (ref 3.81–5.12)
RBC MORPH BLD: NORMAL
RSV RNA RESP QL NAA+PROBE: NEGATIVE
SARS-COV-2 RNA RESP QL NAA+PROBE: NEGATIVE
SODIUM SERPL-SCNC: 138 MMOL/L (ref 135–147)
SPECIMEN SOURCE: ABNORMAL
WBC # BLD AUTO: 2.84 THOUSAND/UL (ref 4.31–10.16)

## 2024-09-08 PROCEDURE — 85007 BL SMEAR W/DIFF WBC COUNT: CPT | Performed by: EMERGENCY MEDICINE

## 2024-09-08 PROCEDURE — 83735 ASSAY OF MAGNESIUM: CPT | Performed by: EMERGENCY MEDICINE

## 2024-09-08 PROCEDURE — 36415 COLL VENOUS BLD VENIPUNCTURE: CPT | Performed by: EMERGENCY MEDICINE

## 2024-09-08 PROCEDURE — 72170 X-RAY EXAM OF PELVIS: CPT

## 2024-09-08 PROCEDURE — 80053 COMPREHEN METABOLIC PANEL: CPT | Performed by: EMERGENCY MEDICINE

## 2024-09-08 PROCEDURE — 85610 PROTHROMBIN TIME: CPT | Performed by: EMERGENCY MEDICINE

## 2024-09-08 PROCEDURE — 85730 THROMBOPLASTIN TIME PARTIAL: CPT | Performed by: EMERGENCY MEDICINE

## 2024-09-08 PROCEDURE — 99284 EMERGENCY DEPT VISIT MOD MDM: CPT

## 2024-09-08 PROCEDURE — 82948 REAGENT STRIP/BLOOD GLUCOSE: CPT

## 2024-09-08 PROCEDURE — 93005 ELECTROCARDIOGRAM TRACING: CPT

## 2024-09-08 PROCEDURE — 36600 WITHDRAWAL OF ARTERIAL BLOOD: CPT

## 2024-09-08 PROCEDURE — 82805 BLOOD GASES W/O2 SATURATION: CPT | Performed by: PHYSICIAN ASSISTANT

## 2024-09-08 PROCEDURE — 96374 THER/PROPH/DIAG INJ IV PUSH: CPT

## 2024-09-08 PROCEDURE — 99222 1ST HOSP IP/OBS MODERATE 55: CPT | Performed by: FAMILY MEDICINE

## 2024-09-08 PROCEDURE — 99285 EMERGENCY DEPT VISIT HI MDM: CPT | Performed by: EMERGENCY MEDICINE

## 2024-09-08 PROCEDURE — 0241U HB NFCT DS VIR RESP RNA 4 TRGT: CPT | Performed by: PHYSICIAN ASSISTANT

## 2024-09-08 PROCEDURE — 70450 CT HEAD/BRAIN W/O DYE: CPT

## 2024-09-08 PROCEDURE — NC001 PR NO CHARGE: Performed by: PHYSICIAN ASSISTANT

## 2024-09-08 PROCEDURE — 85027 COMPLETE CBC AUTOMATED: CPT | Performed by: EMERGENCY MEDICINE

## 2024-09-08 PROCEDURE — 84484 ASSAY OF TROPONIN QUANT: CPT | Performed by: EMERGENCY MEDICINE

## 2024-09-08 PROCEDURE — 72128 CT CHEST SPINE W/O DYE: CPT

## 2024-09-08 PROCEDURE — 72220 X-RAY EXAM SACRUM TAILBONE: CPT

## 2024-09-08 PROCEDURE — 83690 ASSAY OF LIPASE: CPT | Performed by: EMERGENCY MEDICINE

## 2024-09-08 PROCEDURE — 71045 X-RAY EXAM CHEST 1 VIEW: CPT

## 2024-09-08 PROCEDURE — 82140 ASSAY OF AMMONIA: CPT | Performed by: EMERGENCY MEDICINE

## 2024-09-08 PROCEDURE — 72131 CT LUMBAR SPINE W/O DYE: CPT

## 2024-09-08 RX ORDER — LIDOCAINE 50 MG/G
1 PATCH TOPICAL DAILY
Status: DISCONTINUED | OUTPATIENT
Start: 2024-09-09 | End: 2024-09-11 | Stop reason: HOSPADM

## 2024-09-08 RX ORDER — HYDROXYCHLOROQUINE SULFATE 200 MG/1
200 TABLET, FILM COATED ORAL 2 TIMES DAILY WITH MEALS
Status: DISCONTINUED | OUTPATIENT
Start: 2024-09-09 | End: 2024-09-09

## 2024-09-08 RX ORDER — SERTRALINE HYDROCHLORIDE 100 MG/1
100 TABLET, FILM COATED ORAL DAILY
Status: DISCONTINUED | OUTPATIENT
Start: 2024-09-09 | End: 2024-09-11 | Stop reason: HOSPADM

## 2024-09-08 RX ORDER — FENTANYL CITRATE 50 UG/ML
50 INJECTION, SOLUTION INTRAMUSCULAR; INTRAVENOUS ONCE
Status: COMPLETED | OUTPATIENT
Start: 2024-09-08 | End: 2024-09-08

## 2024-09-08 RX ORDER — FOLIC ACID 1 MG/1
1 TABLET ORAL DAILY
Status: DISCONTINUED | OUTPATIENT
Start: 2024-09-09 | End: 2024-09-11 | Stop reason: HOSPADM

## 2024-09-08 RX ORDER — ENOXAPARIN SODIUM 100 MG/ML
40 INJECTION SUBCUTANEOUS DAILY
Status: DISCONTINUED | OUTPATIENT
Start: 2024-09-09 | End: 2024-09-11

## 2024-09-08 RX ORDER — LACTULOSE 10 G/15ML
20 SOLUTION ORAL ONCE
Status: DISCONTINUED | OUTPATIENT
Start: 2024-09-08 | End: 2024-09-08

## 2024-09-08 RX ORDER — LACTULOSE 10 G/15ML
20 SOLUTION ORAL 4 TIMES DAILY
Status: DISCONTINUED | OUTPATIENT
Start: 2024-09-08 | End: 2024-09-09

## 2024-09-08 RX ORDER — FUROSEMIDE 10 MG/ML
40 INJECTION INTRAMUSCULAR; INTRAVENOUS ONCE
Status: COMPLETED | OUTPATIENT
Start: 2024-09-08 | End: 2024-09-08

## 2024-09-08 RX ORDER — TORSEMIDE 20 MG/1
20 TABLET ORAL
Status: DISCONTINUED | OUTPATIENT
Start: 2024-09-09 | End: 2024-09-11 | Stop reason: HOSPADM

## 2024-09-08 RX ORDER — SPIRONOLACTONE 25 MG/1
50 TABLET ORAL DAILY
Status: DISCONTINUED | OUTPATIENT
Start: 2024-09-09 | End: 2024-09-11 | Stop reason: HOSPADM

## 2024-09-08 RX ORDER — KETOROLAC TROMETHAMINE 30 MG/ML
15 INJECTION, SOLUTION INTRAMUSCULAR; INTRAVENOUS ONCE
Status: COMPLETED | OUTPATIENT
Start: 2024-09-08 | End: 2024-09-08

## 2024-09-08 RX ORDER — TIZANIDINE 2 MG/1
4 TABLET ORAL 3 TIMES DAILY
Status: DISCONTINUED | OUTPATIENT
Start: 2024-09-08 | End: 2024-09-09

## 2024-09-08 RX ORDER — POTASSIUM CHLORIDE 1500 MG/1
40 TABLET, EXTENDED RELEASE ORAL ONCE
Status: COMPLETED | OUTPATIENT
Start: 2024-09-08 | End: 2024-09-08

## 2024-09-08 RX ORDER — GABAPENTIN 100 MG/1
200 CAPSULE ORAL 3 TIMES DAILY
Status: DISCONTINUED | OUTPATIENT
Start: 2024-09-08 | End: 2024-09-09

## 2024-09-08 RX ADMIN — GABAPENTIN 200 MG: 100 CAPSULE ORAL at 20:45

## 2024-09-08 RX ADMIN — AMITRIPTYLINE HYDROCHLORIDE 75 MG: 25 TABLET, FILM COATED ORAL at 20:44

## 2024-09-08 RX ADMIN — FUROSEMIDE 40 MG: 10 INJECTION, SOLUTION INTRAMUSCULAR; INTRAVENOUS at 23:11

## 2024-09-08 RX ADMIN — LACTULOSE 20 G: 20 SOLUTION ORAL at 20:45

## 2024-09-08 RX ADMIN — FENTANYL CITRATE 50 MCG: 50 INJECTION INTRAMUSCULAR; INTRAVENOUS at 19:38

## 2024-09-08 RX ADMIN — TIZANIDINE 4 MG: 2 TABLET ORAL at 20:44

## 2024-09-08 RX ADMIN — Medication 5000 UNITS: at 20:47

## 2024-09-08 RX ADMIN — POTASSIUM CHLORIDE 40 MEQ: 1500 TABLET, EXTENDED RELEASE ORAL at 18:57

## 2024-09-08 RX ADMIN — KETOROLAC TROMETHAMINE 15 MG: 30 INJECTION, SOLUTION INTRAMUSCULAR at 18:17

## 2024-09-08 NOTE — ED PROVIDER NOTES
History  Chief Complaint   Patient presents with    Back Pain     Pt recently broke her back. States she fell yesterday and today landing on her bottom. Pt denies hitting head. Per daughter pt not acting right.      Patient with a known L1 fracture.  Also with a known sacral fracture.  Tripped over the cat today and fell.  Landed on her buttocks.  Took a Percocet with some relief.  Fall happened approximately 7 hours ago.  Seems slightly out of it.  Does have history of hepatic encephalopathy.  Has been compliant with her lactulose.  Patient is afraid her ammonia level is elevated.  Feels weak and tired.  Daughter states she seems slightly confused compared to her baseline.      History provided by:  Patient   used: No    Back Pain  Location:  Lumbar spine  Quality:  Aching  Radiates to:  Does not radiate  Pain severity:  Mild  Onset quality:  Sudden  Duration:  7 hours  Timing:  Constant  Progression:  Unchanged  Chronicity:  New  Context: recent injury    Context: not falling    Relieved by:  Narcotics  Worsened by:  Palpation and movement  Ineffective treatments:  None tried  Associated symptoms: no abdominal pain, no abdominal swelling, no bladder incontinence, no bowel incontinence, no chest pain, no dysuria, no fever, no headaches, no numbness, no paresthesias, no perianal numbness, no weakness and no weight loss        Prior to Admission Medications   Prescriptions Last Dose Informant Patient Reported? Taking?   Cholecalciferol (Vitamin D-3) 125 MCG (5000 UT) TABS   Yes No   Sig: Take 1 tablet by mouth once a week Tuesday   amitriptyline (ELAVIL) 75 mg tablet   No No   Sig: Take 1 tablet (75 mg total) by mouth daily at bedtime   folic acid (FOLVITE) 1 mg tablet   Yes No   Sig: Take 1 mg by mouth daily   gabapentin (NEURONTIN) 100 mg capsule   No No   Sig: Take 2 capsules (200 mg total) by mouth 3 (three) times a day   hydroxychloroquine (PLAQUENIL) 200 mg tablet   Yes No   Sig: Take 200 mg  by mouth 2 (two) times a day with meals   lactulose (CHRONULAC) 10 g/15 mL solution   No No   Sig: Take 30 mL (20 g total) by mouth 4 (four) times a day Goal bowel movements is 4 a day   lidocaine (LIDODERM) 5 %   No No   Sig: Apply 1 patch topically over 12 hours daily Remove & Discard patch within 12 hours or as directed by MD   oxyCODONE (ROXICODONE) 10 MG TABS   No No   Sig: Take 1 tablet (10 mg total) by mouth every 6 (six) hours as needed for moderate pain Max Daily Amount: 40 mg   polyethylene glycol (MIRALAX) 17 g packet   No No   Sig: Take 17 g by mouth if needed (constipation) for up to 10 days   sertraline (ZOLOFT) 100 mg tablet   No No   Sig: Take 1 tablet (100 mg total) by mouth daily   spironolactone (ALDACTONE) 50 mg tablet   No No   Sig: Take 1 tablet (50 mg total) by mouth daily   tiZANidine (ZANAFLEX) 4 mg tablet   No No   Sig: Take 1 tablet (4 mg total) by mouth 3 (three) times a day   Patient not taking: Reported on 8/19/2024   torsemide (DEMADEX) 20 mg tablet   No No   Sig: Take 1 tablet (20 mg total) by mouth 2 (two) times a day before meals      Facility-Administered Medications: None       Past Medical History:   Diagnosis Date    Back pain     Carcinoid tumor     neuroendocrine    CHF (congestive heart failure) (HCC)     Cirrhosis of liver (HCC)        History reviewed. No pertinent surgical history.    Family History   Problem Relation Age of Onset    Hypertension Father      I have reviewed and agree with the history as documented.    E-Cigarette/Vaping    E-Cigarette Use Never User      E-Cigarette/Vaping Substances     Social History     Tobacco Use    Smoking status: Never    Smokeless tobacco: Never   Vaping Use    Vaping status: Never Used   Substance Use Topics    Alcohol use: Never    Drug use: Never       Review of Systems   Constitutional:  Negative for chills, fever and weight loss.   HENT:  Negative for ear pain, hearing loss, sore throat, trouble swallowing and voice change.     Eyes:  Negative for pain and discharge.   Respiratory:  Negative for cough, shortness of breath and wheezing.    Cardiovascular:  Negative for chest pain and palpitations.   Gastrointestinal:  Negative for abdominal pain, blood in stool, bowel incontinence, constipation, diarrhea, nausea and vomiting.   Genitourinary:  Negative for bladder incontinence, dysuria, flank pain, frequency and hematuria.   Musculoskeletal:  Positive for back pain. Negative for joint swelling, neck pain and neck stiffness.   Skin:  Negative for rash and wound.   Neurological:  Negative for dizziness, seizures, syncope, facial asymmetry, weakness, numbness, headaches and paresthesias.   Psychiatric/Behavioral:  Negative for hallucinations, self-injury and suicidal ideas.    All other systems reviewed and are negative.      Physical Exam  Physical Exam  Vitals and nursing note reviewed.   Constitutional:       General: She is not in acute distress.     Appearance: She is well-developed.   HENT:      Head: Normocephalic and atraumatic.      Right Ear: External ear normal.      Left Ear: External ear normal.   Eyes:      General: No scleral icterus.        Right eye: No discharge.         Left eye: No discharge.      Extraocular Movements: Extraocular movements intact.      Conjunctiva/sclera: Conjunctivae normal.   Cardiovascular:      Rate and Rhythm: Normal rate and regular rhythm.      Heart sounds: Normal heart sounds. No murmur heard.  Pulmonary:      Effort: Pulmonary effort is normal.      Breath sounds: Normal breath sounds. No wheezing or rales.   Abdominal:      General: Bowel sounds are normal. There is no distension.      Palpations: Abdomen is soft.      Tenderness: There is no abdominal tenderness. There is no guarding or rebound.   Musculoskeletal:         General: Tenderness present. No deformity. Normal range of motion.      Cervical back: Normal range of motion and neck supple.      Comments: Diffusely tender along the  lower lumbar region.   Skin:     General: Skin is warm and dry.      Findings: No rash.   Neurological:      General: No focal deficit present.      Mental Status: She is alert and oriented to person, place, and time.      Cranial Nerves: No cranial nerve deficit.   Psychiatric:         Mood and Affect: Mood normal.         Behavior: Behavior normal.         Thought Content: Thought content normal.         Judgment: Judgment normal.         Vital Signs  ED Triage Vitals [09/08/24 1800]   Temperature Pulse Respirations Blood Pressure SpO2   98.4 °F (36.9 °C) 100 20 169/82 94 %      Temp Source Heart Rate Source Patient Position - Orthostatic VS BP Location FiO2 (%)   Temporal Monitor -- -- --      Pain Score       10 - Worst Possible Pain           Vitals:    09/08/24 1800 09/08/24 1900   BP: 169/82 156/84   Pulse: 100 93         Visual Acuity  Visual Acuity      Flowsheet Row Most Recent Value   L Pupil Size (mm) 3   R Pupil Size (mm) 3            ED Medications  Medications   fentaNYL injection 50 mcg (has no administration in time range)   lactulose (CHRONULAC) oral solution 20 g (has no administration in time range)   ketorolac (TORADOL) injection 15 mg (15 mg Intravenous Given 9/8/24 1817)   potassium chloride (Klor-Con M20) CR tablet 40 mEq (40 mEq Oral Given 9/8/24 1857)       Diagnostic Studies  Results Reviewed       Procedure Component Value Units Date/Time    HS Troponin 0hr (reflex protocol) [[id number]]  (Normal) Collected: 09/08/24 1812    Lab Status: Final result Specimen: Blood from Arm, Left Updated: 09/08/24 1842     hs TnI 0hr 5 ng/L     Comprehensive metabolic panel [[id number]]  (Abnormal) Collected: 09/08/24 1812    Lab Status: Final result Specimen: Blood from Arm, Left Updated: 09/08/24 1838     Sodium 138 mmol/L      Potassium 3.3 mmol/L      Chloride 99 mmol/L      CO2 32 mmol/L      ANION GAP 7 mmol/L      BUN 12 mg/dL      Creatinine 1.06 mg/dL      Glucose 76 mg/dL      Calcium 8.6 mg/dL       Corrected Calcium 9.2 mg/dL      AST 47 U/L      ALT 17 U/L      Alkaline Phosphatase 198 U/L      Total Protein 7.3 g/dL      Albumin 3.3 g/dL      Total Bilirubin 1.09 mg/dL      eGFR 59 ml/min/1.73sq m     Narrative:      National Kid[employer] Foundation guidelines for Chronic Kidney Disease (CKD):     Stage 1 with normal or high GFR (GFR > 90 mL/min/1.73 square meters)    Stage 2 Mild CKD (GFR = 60-89 mL/min/1.73 square meters)    Stage 3A Moderate CKD (GFR = 45-59 mL/min/1.73 square meters)    Stage 3B Moderate CKD (GFR = 30-44 mL/min/1.73 square meters)    Stage 4 Severe CKD (GFR = 15-29 mL/min/1.73 square meters)    Stage 5 End Stage CKD (GFR <15 mL/min/1.73 square meters)  Note: GFR calculation is accurate only with a steady state creatinine    Magnesium [[id number]]  (Normal) Collected: 09/08/24 1812    Lab Status: Final result Specimen: Blood from Arm, Left Updated: 09/08/24 1838     Magnesium 2.0 mg/dL     Lipase [[id number]]  (Abnormal) Collected: 09/08/24 1812    Lab Status: Final result Specimen: Blood from Arm, Left Updated: 09/08/24 1838     Lipase 7 u/L     Ammonia [[id number]]  (Abnormal) Collected: 09/08/24 1812    Lab Status: Final result Specimen: Blood from Arm, Left Updated: 09/08/24 1836     Ammonia 166 umol/L     Protime-INR [[id number]]  (Abnormal) Collected: 09/08/24 1812    Lab Status: Final result Specimen: Blood from Arm, Left Updated: 09/08/24 1831     Protime 16.0 seconds      INR 1.25    Narrative:      INR Therapeutic Range    Indication                                             INR Range      Atrial Fibrillation                                               2.0-3.0  Hypercoagulable State                                    2.0.2.3  Left Ventricular Asist Device                            2.0-3.0  Mechanical Heart Valve                                  -    Aortic(with afib, MI, embolism, HF, LA enlargement,    and/or coagulopathy)                                     2.0-3.0  (2.5-3.5)     Mitral                                                             2.5-3.5  Prosthetic/Bioprosthetic Heart Valve               2.0-3.0  Venous thromboembolism (VTE: VT, PE        2.0-3.0    APTT [[id number]]  (Normal) Collected: 09/08/24 1812    Lab Status: Final result Specimen: Blood from Arm, Left Updated: 09/08/24 1831     PTT 33 seconds     CBC and differential [[id number]]  (Abnormal) Collected: 09/08/24 1812    Lab Status: Final result Specimen: Blood from Arm, Left Updated: 09/08/24 1825     WBC 2.84 Thousand/uL      RBC 3.28 Million/uL      Hemoglobin 10.3 g/dL      Hematocrit 32.5 %      MCV 99 fL      MCH 31.4 pg      MCHC 31.7 g/dL      RDW 14.4 %      MPV 10.2 fL      Platelets 66 Thousands/uL     Manual Differential(PHLEBS Do Not Order) [[id number]] Collected: 09/08/24 1812    Lab Status: In process Specimen: Blood from Arm, Left Updated: 09/08/24 1825                   CT spine thoracic and lumbar wo contrast   Final Result by Don Thompson MD (09/08 1917)      S-shaped thoracolumbar scoliosis is again noted.      Superior L1 endplate compression fracture is again noted with increased interval loss of height (now 25 to 30%) and mild anterior wedging compared to the prior 7/30/2024 CT. There is new mild retropulsion causing approximately 25% canal compromise. There    is no new fracture or subluxation.               Workstation performed: [id number]         CT head without contrast   Final Result by Don Thompson MD (09/08 1909)      No acute intracranial abnormality.                  Workstation performed: [id number]         XR pelvis ap only 1 or 2 vw   ED Interpretation by Bladimir Mcmanus MD (09/08 1841)   No fracture      XR sacrum and coccyx   ED Interpretation by Bladimir Mcmanus MD (09/08 1842)   No fracture                 Procedures  ECG 12 Lead Documentation Only    Date/Time: 9/8/2024 6:52 PM    Performed by: Bladimir Mcmanus MD  Authorized by: Bladimir Mcmanus MD     ECG reviewed by me, the ED Provider: yes    Patient location:  ED  Rate:     ECG rate:  90  Rhythm:     Rhythm: sinus rhythm    Ectopy:     Ectopy: none    QRS:     QRS axis:  Normal           ED Course  ED Course as of 09/08/24 1937   Sun Sep 08, 2024   1902 History of L1 fracture.  Tripped over her cat today and hurt her back.  Workup for fall has been unremarkable.  Daughter states the patient seems more confused today.  Patient states she is feeling weaker and tired today.  Seems out of it.  Does have a history of hepatic encephalopathy.  Has been compliant with her lactulose and Aldactone.  Was admitted in June for the same symptoms and had hepatic encephalopathy with ammonia level of 159.  Ammonia level and July was 31.  Her ammonia level today is 166.  Would like to admit for hepatic encephalopathy.                                 SBIRT 22yo+      Flowsheet Row Most Recent Value   Initial Alcohol Screen: US AUDIT-C     1. How often do you have a drink containing alcohol? 0 Filed at: 09/08/2024 1801   2. How many drinks containing alcohol do you have on a typical day you are drinking?  0 Filed at: 09/08/2024 1801   3b. FEMALE Any Age, or MALE 65+: How often do you have 4 or more drinks on one occassion? 0 Filed at: 09/08/2024 1801   Audit-C Score 0 Filed at: 09/08/2024 1801   BERNIE: How many times in the past year have you...    Used an illegal drug or used a prescription medication for non-medical reasons? Never Filed at: 09/08/2024 1801                  TLICS Criteria      Flowsheet Row Most Recent Value   Thoracolumbar Injury Classification & Severity Scale    Morphology 1 Filed at: 09/08/2024 1927   Neurologic involvement 0 Filed at: 09/08/2024 1927   Posterior ligament complex 2 Filed at: 09/08/2024 1927   TLICS Score 3 Filed at: 09/08/2024 1927            Medical Decision Making  Amount and/or Complexity of Data Reviewed  Labs: ordered.  Radiology: ordered and independent interpretation  performed.    Risk  Prescription drug management.  Decision regarding hospitalization.                 Disposition  Final diagnoses:   Hepatic encephalopathy (HCC)   Hypokalemia   Closed compression fracture of body of L1 vertebra (HCC)     Time reflects when diagnosis was documented in both MDM as applicable and the Disposition within this note       Time User Action Codes Description Comment    9/8/2024  6:39 PM Bladimir Mcmanus [K76.82] Hepatic encephalopathy (HCC)     9/8/2024  6:48 PM Bladimir Mcmanus [E87.6] Hypokalemia     9/8/2024  7:29 PM Bladimir Mcmanus [S32.010A] Closed compression fracture of body of L1 vertebra (HCC)           ED Disposition       ED Disposition   Admit    Condition   Stable    Date/Time   Sun Sep 8, 2024 1936    Comment   Case was discussed with Dr. Anaya and the patient's admission status was agreed to be Admission Status: inpatient status to the service of Dr. Anaya .               Follow-up Information    None         Patient's Medications   Discharge Prescriptions    No medications on file       No discharge procedures on file.    PDMP Review         Value Time User    PDMP Reviewed  Yes 8/3/2024 11:35 AM Ruslan Keyes MD            ED Provider  Electronically Signed by             Bladimir Mcmanus MD  09/08/24 1937

## 2024-09-09 LAB
ALBUMIN SERPL BCG-MCNC: 2.9 G/DL (ref 3.5–5)
ALP SERPL-CCNC: 174 U/L (ref 34–104)
ALT SERPL W P-5'-P-CCNC: 16 U/L (ref 7–52)
AMMONIA PLAS-SCNC: 129 UMOL/L (ref 18–72)
ANION GAP SERPL CALCULATED.3IONS-SCNC: 7 MMOL/L (ref 4–13)
AST SERPL W P-5'-P-CCNC: 42 U/L (ref 13–39)
ATRIAL RATE: 86 BPM
ATRIAL RATE: 91 BPM
BACTERIA UR QL AUTO: ABNORMAL /HPF
BILIRUB SERPL-MCNC: 1.33 MG/DL (ref 0.2–1)
BILIRUB UR QL STRIP: ABNORMAL
BUN SERPL-MCNC: 16 MG/DL (ref 5–25)
CALCIUM ALBUM COR SERPL-MCNC: 9.2 MG/DL (ref 8.3–10.1)
CALCIUM SERPL-MCNC: 8.3 MG/DL (ref 8.4–10.2)
CAOX CRY URNS QL MICRO: ABNORMAL /HPF
CHLORIDE SERPL-SCNC: 100 MMOL/L (ref 96–108)
CLARITY UR: CLEAR
CO2 SERPL-SCNC: 32 MMOL/L (ref 21–32)
COLOR UR: YELLOW
CREAT SERPL-MCNC: 1.22 MG/DL (ref 0.6–1.3)
ERYTHROCYTE [DISTWIDTH] IN BLOOD BY AUTOMATED COUNT: 14.7 % (ref 11.6–15.1)
GFR SERPL CREATININE-BSD FRML MDRD: 50 ML/MIN/1.73SQ M
GLUCOSE SERPL-MCNC: 89 MG/DL (ref 65–140)
GLUCOSE UR STRIP-MCNC: NEGATIVE MG/DL
HCT VFR BLD AUTO: 29.5 % (ref 34.8–46.1)
HGB BLD-MCNC: 9.3 G/DL (ref 11.5–15.4)
HGB UR QL STRIP.AUTO: ABNORMAL
KETONES UR STRIP-MCNC: ABNORMAL MG/DL
LEUKOCYTE ESTERASE UR QL STRIP: ABNORMAL
MAGNESIUM SERPL-MCNC: 2 MG/DL (ref 1.9–2.7)
MCH RBC QN AUTO: 31.5 PG (ref 26.8–34.3)
MCHC RBC AUTO-ENTMCNC: 31.5 G/DL (ref 31.4–37.4)
MCV RBC AUTO: 100 FL (ref 82–98)
NITRITE UR QL STRIP: NEGATIVE
NON-SQ EPI CELLS URNS QL MICRO: ABNORMAL /HPF
P AXIS: -18 DEGREES
P AXIS: -20 DEGREES
PH UR STRIP.AUTO: 6 [PH]
PLATELET # BLD AUTO: 55 THOUSANDS/UL (ref 149–390)
PMV BLD AUTO: 10 FL (ref 8.9–12.7)
POTASSIUM SERPL-SCNC: 3.3 MMOL/L (ref 3.5–5.3)
PR INTERVAL: 174 MS
PR INTERVAL: 176 MS
PROT SERPL-MCNC: 6.4 G/DL (ref 6.4–8.4)
PROT UR STRIP-MCNC: NEGATIVE MG/DL
QRS AXIS: -42 DEGREES
QRS AXIS: 7 DEGREES
QRSD INTERVAL: 102 MS
QRSD INTERVAL: 92 MS
QT INTERVAL: 390 MS
QT INTERVAL: 432 MS
QTC INTERVAL: 479 MS
QTC INTERVAL: 516 MS
RBC # BLD AUTO: 2.95 MILLION/UL (ref 3.81–5.12)
RBC #/AREA URNS AUTO: ABNORMAL /HPF
SODIUM SERPL-SCNC: 139 MMOL/L (ref 135–147)
SP GR UR STRIP.AUTO: 1.02 (ref 1–1.03)
T WAVE AXIS: -2 DEGREES
T WAVE AXIS: -41 DEGREES
UROBILINOGEN UR QL STRIP.AUTO: 1 E.U./DL
VENTRICULAR RATE: 86 BPM
VENTRICULAR RATE: 91 BPM
WBC # BLD AUTO: 2.33 THOUSAND/UL (ref 4.31–10.16)
WBC #/AREA URNS AUTO: ABNORMAL /HPF

## 2024-09-09 PROCEDURE — 97163 PT EVAL HIGH COMPLEX 45 MIN: CPT

## 2024-09-09 PROCEDURE — 93010 ELECTROCARDIOGRAM REPORT: CPT | Performed by: INTERNAL MEDICINE

## 2024-09-09 PROCEDURE — 85027 COMPLETE CBC AUTOMATED: CPT | Performed by: FAMILY MEDICINE

## 2024-09-09 PROCEDURE — 81001 URINALYSIS AUTO W/SCOPE: CPT | Performed by: FAMILY MEDICINE

## 2024-09-09 PROCEDURE — 83735 ASSAY OF MAGNESIUM: CPT | Performed by: FAMILY MEDICINE

## 2024-09-09 PROCEDURE — 80053 COMPREHEN METABOLIC PANEL: CPT | Performed by: FAMILY MEDICINE

## 2024-09-09 PROCEDURE — 82140 ASSAY OF AMMONIA: CPT | Performed by: FAMILY MEDICINE

## 2024-09-09 PROCEDURE — 97167 OT EVAL HIGH COMPLEX 60 MIN: CPT

## 2024-09-09 PROCEDURE — 99232 SBSQ HOSP IP/OBS MODERATE 35: CPT | Performed by: FAMILY MEDICINE

## 2024-09-09 RX ORDER — POTASSIUM CHLORIDE 1500 MG/1
40 TABLET, EXTENDED RELEASE ORAL ONCE
Status: COMPLETED | OUTPATIENT
Start: 2024-09-09 | End: 2024-09-09

## 2024-09-09 RX ORDER — OXYCODONE HYDROCHLORIDE 5 MG/1
2.5 TABLET ORAL EVERY 6 HOURS PRN
Status: DISCONTINUED | OUTPATIENT
Start: 2024-09-09 | End: 2024-09-09

## 2024-09-09 RX ORDER — LACTULOSE 10 G/15ML
30 SOLUTION ORAL 4 TIMES DAILY
Status: DISCONTINUED | OUTPATIENT
Start: 2024-09-09 | End: 2024-09-11 | Stop reason: HOSPADM

## 2024-09-09 RX ORDER — OXYCODONE HYDROCHLORIDE 5 MG/1
5 TABLET ORAL EVERY 6 HOURS PRN
Status: DISCONTINUED | OUTPATIENT
Start: 2024-09-09 | End: 2024-09-09

## 2024-09-09 RX ADMIN — LACTULOSE 30 G: 20 SOLUTION ORAL at 21:09

## 2024-09-09 RX ADMIN — ENOXAPARIN SODIUM 40 MG: 40 INJECTION SUBCUTANEOUS at 08:56

## 2024-09-09 RX ADMIN — RIFAXIMIN 550 MG: 550 TABLET ORAL at 21:10

## 2024-09-09 RX ADMIN — TORSEMIDE 20 MG: 20 TABLET ORAL at 09:05

## 2024-09-09 RX ADMIN — LIDOCAINE 5% 1 PATCH: 700 PATCH TOPICAL at 08:56

## 2024-09-09 RX ADMIN — SODIUM CHLORIDE 500 ML: 0.9 INJECTION, SOLUTION INTRAVENOUS at 15:43

## 2024-09-09 RX ADMIN — GABAPENTIN 200 MG: 100 CAPSULE ORAL at 08:55

## 2024-09-09 RX ADMIN — HYDROXYCHLOROQUINE SULFATE 200 MG: 200 TABLET, FILM COATED ORAL at 09:05

## 2024-09-09 RX ADMIN — LACTULOSE 30 G: 20 SOLUTION ORAL at 17:21

## 2024-09-09 RX ADMIN — FOLIC ACID 1 MG: 1 TABLET ORAL at 09:06

## 2024-09-09 RX ADMIN — LACTULOSE 20 G: 20 SOLUTION ORAL at 08:55

## 2024-09-09 RX ADMIN — TIZANIDINE 4 MG: 2 TABLET ORAL at 08:51

## 2024-09-09 RX ADMIN — LACTULOSE 30 G: 20 SOLUTION ORAL at 12:03

## 2024-09-09 RX ADMIN — SPIRONOLACTONE 50 MG: 25 TABLET ORAL at 08:56

## 2024-09-09 RX ADMIN — SERTRALINE 100 MG: 100 TABLET, FILM COATED ORAL at 08:55

## 2024-09-09 RX ADMIN — POTASSIUM CHLORIDE 40 MEQ: 1500 TABLET, EXTENDED RELEASE ORAL at 15:29

## 2024-09-09 RX ADMIN — LACTULOSE 200 G: 10 SOLUTION ORAL at 15:29

## 2024-09-09 RX ADMIN — AMITRIPTYLINE HYDROCHLORIDE 75 MG: 25 TABLET, FILM COATED ORAL at 21:09

## 2024-09-09 RX ADMIN — RIFAXIMIN 550 MG: 550 TABLET ORAL at 15:29

## 2024-09-09 NOTE — PROGRESS NOTES
"[facility]  Progress Note  Name: Bailey Olsen I  MRN: [id number]  Unit/Bed#: -01 I Date of Admission: 9/8/2024   Date of Service: 9/9/2024 I Hospital Day: 1    Assessment & Plan   Hepatic encephalopathy (HCC)  Assessment & Plan  Patient with history of Larsen cirrhosis-follows with [facility] hepatology/gastroenterology  Maintained in outpatient setting on 3 times daily to 4 times daily lactulose as well as Aldactone  Reported compliance  Elevated ammonia upon presentation with concurrent \"confusion\" per daughter  Increase lactulose dose and also Xifaxan.  Patient states she has not had a bowel movement in a few days.  Patient also reportedly had a rapid response secondary to increased confusion overnight      Closed compression fracture of L1 lumbar vertebra, sequela  Assessment & Plan  Plan as above    Class 3 severe obesity due to excess calories in adult (HCC)  Assessment & Plan  Recommend incorporating a more whole foods plant-predominant diet along with decreasing consumption of red meats and processed foods  Per AHA guidelines, recommend moderate-vigorous intensity exercise for 30 minutes a day for 5 days a week or a total of 150 min/week    * Fall from standing  Assessment & Plan  Patient presents today (9/8) after sustaining a mechanical fall at home earlier today (approximately 8 hours prior to arrival)  Per patient-tripped over her cat resulting in a fall onto her buttocks-denies head strike-notes acute on chronic low back pain  Patient without complaints of urinary or bowel incontinence/saddle anesthesia  Notes recent history of L1 compression fracture (7/2024)  Patient denies loss of consciousness, lightheadedness, dizziness, aura  Patient daughter reports that patient is confused and \"out of it\"-similar to when experienced hepatic encephalopathy in the past (see below)    In ED, patient without SIRS criteria, without oxygen requirement  Metabolic profile most significant for " elevated ammonia-hematologic profile unrevealing  Patient hemodynamically stable  Underwent CT lumbar spine with results as follows:   Superior L1 endplate compression fracture is again noted with increased interval loss of height (now 25 to 30%)  Mild anterior wedging compared to the prior 7/30/2024 CT.   There is new mild retropulsion causing approximately 25% canal compromise.   There is no new fracture or subluxation.    Plan:  Admit to hospitalist service  Consultation to physical and Occupational Therapy  TLSO brace  Was evaluated by neurosurgery at time of initial L1 compression fracture-not surgical candidate  Patient without neurologic compromise-saddle anesthesia/urinary or bowel incontinence-will place consultation to neurosurgery as will require follow-up in the outpatient setting  Low index suspicion for syncopal or neurologic event given patient history  Fall precautions                 VTE Pharmacologic Prophylaxis:   Moderate Risk (Score 3-4) - Pharmacological DVT Prophylaxis Ordered: enoxaparin (Lovenox).    Mobility:   Basic Mobility Inpatient Raw Score: 14  [facility] Goal: 4: Move to chair/commode  [facility] Achieved: 5: Stand (1 or more minutes)  [facility]M Goal achieved. Continue to encourage appropriate mobility.    Patient Centered Rounds: I performed bedside rounds with nursing staff today.   Discussions with Specialists or Other Care Team Provider: none    Education and Discussions with Family / Patient: will update    Total Time Spent on Date of Encounter in care of patient: 35 mins. This time was spent on one or more of the following: performing physical exam; counseling and coordination of care; obtaining or reviewing history; documenting in the medical record; reviewing/ordering tests, medications or procedures; communicating with other healthcare professionals and discussing with patient's family/caregivers.    Current Length of Stay: 1 day(s)  Current Patient Status: Inpatient   Certification  Statement: The patient will continue to require additional inpatient hospital stay due to hepatic encephalopathy  Discharge Plan: Anticipate discharge in 48-72 hrs to discharge location to be determined pending rehab evaluations.    Code Status: Level 1 - Full Code    Subjective:   Patient complains of recent fall and back pain which is still bothering her.  Had rapid response overnight secondary to increased confusion    Objective:     Vitals:   Temp (24hrs), Av °F (36.7 °C), Min:97.5 °F (36.4 °C), Max:98.7 °F (37.1 °C)    Temp:  [97.5 °F (36.4 °C)-98.7 °F (37.1 °C)] 97.7 °F (36.5 °C)  HR:  [] 85  Resp:  [17-20] 18  BP: ()/(53-84) 96/55  SpO2:  [91 %-99 %] 95 %  Body mass index is 46.08 kg/m².     Input and Output Summary (last 24 hours):     Intake/Output Summary (Last 24 hours) at 2024 1400  Last data filed at 2024 0900  Gross per 24 hour   Intake 120 ml   Output 0 ml   Net 120 ml       Physical Exam:   Physical Exam  Vitals and nursing note reviewed.   Constitutional:       Appearance: She is ill-appearing.   HENT:      Head: Normocephalic and atraumatic.      Right Ear: External ear normal.      Left Ear: External ear normal.      Nose: Nose normal.      Mouth/Throat:      Pharynx: Oropharynx is clear.   Cardiovascular:      Rate and Rhythm: Normal rate and regular rhythm.      Heart sounds: Normal heart sounds.   Pulmonary:      Effort: Pulmonary effort is normal.      Breath sounds: Normal breath sounds.   Abdominal:      General: Bowel sounds are normal.      Palpations: Abdomen is soft.      Tenderness: There is no abdominal tenderness.   Musculoskeletal:         General: Tenderness present. Normal range of motion.      Cervical back: Normal range of motion and neck supple.   Skin:     General: Skin is warm and dry.      Capillary Refill: Capillary refill takes less than 2 seconds.   Neurological:      Mental Status: She is alert.      Comments: Oriented to person.  Mixed up with  most other answers.  Is able to tell me the month and the year correctly   Psychiatric:         Mood and Affect: Mood normal.            Additional Data:     Labs:  Results from last 7 days   Lab Units 09/09/24  0550 09/08/24  1812   WBC Thousand/uL 2.33* 2.84*   HEMOGLOBIN g/dL 9.3* 10.3*   HEMATOCRIT % 29.5* 32.5*   PLATELETS Thousands/uL 55* 66*   BANDS PCT %  --  2   LYMPHO PCT %  --  26   MONO PCT %  --  5   EOS PCT %  --  3     Results from last 7 days   Lab Units 09/09/24  0550   SODIUM mmol/L 139   POTASSIUM mmol/L 3.3*   CHLORIDE mmol/L 100   CO2 mmol/L 32   BUN mg/dL 16   CREATININE mg/dL 1.22   ANION GAP mmol/L 7   CALCIUM mg/dL 8.3*   ALBUMIN g/dL 2.9*   TOTAL BILIRUBIN mg/dL 1.33*   ALK PHOS U/L 174*   ALT U/L 16   AST U/L 42*   GLUCOSE RANDOM mg/dL 89     Results from last 7 days   Lab Units 09/08/24  1812   INR  1.25*     Results from last 7 days   Lab Units 09/08/24  2247   POC GLUCOSE mg/dl 113               Lines/Drains:  Invasive Devices       Peripheral Intravenous Line  Duration             Peripheral IV 09/08/24 Left Antecubital <1 day              Drain  Duration             External Urinary Catheter <1 day                          Imaging: Reviewed radiology reports from this admission including: CT head and xray(s)    Recent Cultures (last 7 days):         Last 24 Hours Medication List:   Current Facility-Administered Medications   Medication Dose Route Frequency Provider Last Rate    amitriptyline  75 mg Oral HS Antonio Anaya DO      Cholecalciferol  5,000 Units Oral Weekly Antonio Anaya DO      enoxaparin  40 mg Subcutaneous Daily Antonio Anaya DO      folic acid  1 mg Oral Daily Antonio Anaya DO      gabapentin  200 mg Oral TID Antonio Anaya DO      hydroxychloroquine  200 mg Oral BID With Meals Antonio Anaya DO      lactulose  30 g Oral 4x Daily Idalia Stuart MD      lidocaine  1 patch Topical Daily Antonio Anaya DO      oxyCODONE  5  mg Oral Q6H PRN Idalia Stuart MD      potassium chloride  40 mEq Oral Once Idalia Stuart MD      rifaximin  550 mg Oral Q12H MIGUEL Idalia Stuart MD      sertraline  100 mg Oral Daily Antonio Anaya, DO      spironolactone  50 mg Oral Daily Antonio Anaya,       tiZANidine  4 mg Oral TID Antonio Anaya, DO      torsemide  20 mg Oral BID AC Antonio Anaya, DO          Today, Patient Was Seen By: Idalia Stuart MD    **Please Note: This note may have been constructed using a voice recognition system.**

## 2024-09-09 NOTE — PLAN OF CARE

## 2024-09-09 NOTE — RAPID RESPONSE
Rapid Response Note  Bailey Olsen 53 y.o. female MRN: [id number]  Unit/Bed#: -01 Encounter: [id number]    Rapid Response Notification(s):   Response called date/time:  9/8/2024 10:48 AM  Response team arrival date/time:  9/8/2024 10:48 AM  Response end date/time:  9/8/2024 10:54 AM  Level of care:  Medsur  [facility]  Primary reason for rapid response call:  Acute change in neuro status and acute change in O2 sat    Rapid Response Intervention(s):   Airway:  None  Breathing:  Oxygen  Circulation:  None  Fluids administered:  None  Medications administered:  None       Assessment:   Acute encephalopathy- likely multifactorial secondary to polypharmacy, elevated ammonia, hypercarbia    Plan:   ABG- 7.4/54.5/71/35.7  CT brain reviewed  CXR reviewed- concern for pulmonary vascular congestion  Check COVID/RSV  Supplemental 02 PRN  Give dose of lasix   Continue lactulose   Consider Bipap but patient compensated- likely secondary to OHS      Rapid Response Outcome:   Transfer:  Remain on floor  Primary service notified of transfer: Yes    Code Status: Level 1 (Full Code)      Family notified: Primary team to notify Family Member       Background/Situation:   Bailey Olsen is a 53 y.o. female who presented to the ED today and admitted today for acute metabolic encephalopathy. RRT called for increased lethargy and hypoxia    Review of Systems   Unable to perform ROS: Mental status change       Objective:   Vitals:    09/08/24 2237 09/08/24 2300 09/08/24 2310 09/08/24 2334   BP: 108/58 110/59  103/57   BP Location:       Pulse: 86 84 84 83   Resp:       Temp:       TempSrc:       SpO2: 92% 91% 91% 93%   Weight:       Height:         Physical Exam  Vitals reviewed.   Constitutional:       General: She is not in acute distress.     Appearance: She is not ill-appearing or toxic-appearing.   HENT:      Head: Normocephalic and atraumatic.      Nose: Nose normal.      Mouth/Throat:      Mouth:  Mucous membranes are dry.   Eyes:      Pupils: Pupils are equal, round, and reactive to light.   Cardiovascular:      Rate and Rhythm: Normal rate.   Pulmonary:      Effort: Pulmonary effort is normal. No respiratory distress.      Comments: Rhonchi BL  Abdominal:      Palpations: Abdomen is soft.   Musculoskeletal:         General: Normal range of motion.   Skin:     General: Skin is warm.      Capillary Refill: Capillary refill takes less than 2 seconds.   Neurological:      Comments: Patient opens eyes to voice  Will respond verbally when noxious stimuli applied  Follows commands in all extremities  Quick to fall back to sleep     Tobias Durbin PA-C

## 2024-09-09 NOTE — PLAN OF CARE
Problem: OCCUPATIONAL THERAPY ADULT  Goal: Performs self-care activities at highest level of function for planned discharge setting.  See evaluation for individualized goals.  Description: Treatment Interventions: ADL retraining, Visual perceptual retraining, Functional transfer training, UE strengthening/ROM, Endurance training, Cognitive reorientation, Patient/family training, Equipment evaluation/education, Neuromuscular reeducation, Fine motor coordination activities, Compensatory technique education, UE splinting, Continued evaluation, Energy conservation, Cardiac education, Activityengagement          See flowsheet documentation for full assessment, interventions and recommendations.   Note: Limitation: Decreased ADL status, Decreased UE strength, Decreased UE ROM, Decreased Safe judgement during ADL, Decreased cognition, Decreased endurance, Decreased high-level ADLs, Decreased self-care trans  Prognosis: Good  Assessment: Pt is a 53 y.o. female, admitted to [facility] 9/8/2024 d/t experiencing a fall at home with increased confusion. Dx: fall from standing. Pt with PMHx impacting their performance during ADL tasks, including: back pain, HYATT cirrhosis, neuroendocrine tumor, CHF. Prior to admission to the hospital Pt was performing ADLs without physical assistance. IADLs with physical assistance. Functional transfers/ambulation without physical assistance. Cognitive status was PTA was Intact. OT order placed to assess Pt's ADLs, cognitive status, and performance during functional tasks in order to maximize safety and independence while making most appropriate d/c recommendations. PT/OT co-evaluation completed at this time d/t significant mobility deficits and safety concerns. Pt's clinical presentation is currently unstable/unpredictable given new onset deficits that effect Pt's occupational performance and ability to safely return to PLOF including decrease activity tolerance, decrease standing balance, decrease  sitting balance, decrease performance during ADL tasks, decrease cognition, decrease safety awareness , decrease BUE ROM, decrease UB MS, decrease generalized strength, decrease activity engagement, and decrease performance during functional transfers combined with medical complications of abnormal H&H, abnormal CBC, abnormal potassium values, and need for input for mobility technique/safety. Pt maintained Good O2 sats on 2lpm. Personal factors affecting Pt at time of initial evaluation include: step(s) to enter environment, multi-level environment, past experience, inability to ambulate household distances, inability to navigate community distances, limited insight into impairments, decreased initiation and engagement, and recent fall(s)/fall history. Pt will benefit from continued skilled OT services to address deficits as defined above and to maximize level independence/participation during ADLs and functional tasks to facilitate return toward PLOF and improved quality of life. From an occupational therapy standpoint, recommendation at time of d/c would be Level II: Moderate Resource Intensity Therapy.     Rehab Resource Intensity Level, OT: II (Moderate Resource Intensity)

## 2024-09-09 NOTE — UTILIZATION REVIEW
"Initial Clinical Review    Admission: Date/Time/Statement:   Admission Orders (From admission, onward)       Ordered        09/08/24 1936  INPATIENT ADMISSION  Once                          Orders Placed This Encounter   Procedures    INPATIENT ADMISSION     Standing Status:   Standing     Number of Occurrences:   1     Order Specific Question:   Level of Care     Answer:   Med Surg [16]     Order Specific Question:   Estimated length of stay     Answer:   More than 2 Midnights     Order Specific Question:   Certification     Answer:   I certify that inpatient services are medically necessary for this patient for a duration of greater than two midnights. See H&P and MD Progress Notes for additional information about the patient's course of treatment.     ED Arrival Information       Expected   -    Arrival   9/8/2024 17:53    Acuity   Urgent              Means of arrival   Walk-In    Escorted by   Family Member    Service   Hospitalist    Admission type   Emergency              Arrival complaint   Fall- Back pain             Chief Complaint   Patient presents with    Back Pain     Pt recently broke her back. States she fell yesterday and today landing on her bottom. Pt denies hitting head. Per daughter pt not acting right.        Initial Presentation: 53 y.o. female to ED via walk-in from home  Present to ED after a mechanical fall at home. Patient poor historian. Per report, patient tripped over her cat. Patient's daughter noted no evidence of injury however noted that mom was \"confused\". 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   PMHX: Larsen cirrhosis, obesity, hypertension, anxiety, fibromyalgia, chronic pain, CHF, recent history of L1 compression fracture (7/2024)   Admitted to [facility] with DX: Fall from standing   on exam: tachy; lungs with rales and wheezing; WBC 2.84; K 3.3; INR 1.25; elevated ammonia 166  PLAN: recd lasix iv x1; start " lactulose; monitor labs; cont TLSO brace; PT/ OT eval - tx; pain control       Anticipated Length of Stay/Certification Statement:  Patient will be admitted on an Inpatient basis with an anticipated length of stay of  > 2 midnights.   Justification for Hospital Stay: Acute encephalopathy       Date: 9/9/24      Day 2   Patient complains of recent fall and back pain which is still bothering her. Increase lactulose dose and also Xifaxan. Patient states she has not had a bowel movement in a few days. Patient also reportedly had a rapid response secondary to increased confusion overnight. Exam:  Oriented to person. Mixed up with most other answers. Is able to tell me the month and the year correctly; Heme 9.3; ammonia 129; K 3.3; Albumin 2.9    Plan: inc lactulose; monitor labs; neuro checks; f/u PT/ OT recom; pain control     ED Triage Vitals [09/08/24 1800]   Temperature Pulse Respirations Blood Pressure SpO2 Pain Score   98.4 °F (36.9 °C) 100 20 169/82 94 % 10 - Worst Possible Pain     Weight (last 2 days)       Date/Time Weight    09/08/24 2032 126 (276.9)    09/08/24 1800 125 (275.35)            Vital Signs (last 3 days)       Date/Time Temp Pulse Resp BP MAP (mmHg) SpO2 Calculated FIO2 (%) - Nasal Cannula Nasal Cannula O2 Flow Rate (L/min) O2 Device Patient Position - Orthostatic VS Iris Coma Scale Score Pain    09/09/24 13:56:25 97.7 °F (36.5 °C) 85 18 96/55 69 95 % -- -- -- -- -- --    09/09/24 10:19:51 -- 77 -- 96/57 70 95 % -- -- -- -- -- --    09/09/24 10:12:36 -- 83 -- 101/64 76 93 % -- -- -- -- -- --    09/09/24 0900 -- -- -- -- -- -- 28 2 L/min Nasal cannula -- -- --    09/09/24 08:56:14 -- 91 -- 110/64 79 99 % -- -- -- -- -- --    09/09/24 07:02:01 97.5 °F (36.4 °C) -- 18 98/53 68 -- -- -- -- -- -- --    09/09/24 06:08:24 -- 81 -- 99/53 68 95 % -- -- -- -- -- --    09/09/24 02:51:30 -- 84 -- 107/67 80 94 % -- -- -- -- -- --    09/09/24 01:00:03 -- 82 -- 96/55 69 94 % -- -- -- -- -- --    09/08/24  23:59:25 -- 84 -- 101/57 72 93 % -- -- -- -- -- --    09/08/24 23:34:32 -- 83 -- 103/57 72 93 % -- -- -- -- -- --    09/08/24 2310 -- 84 -- -- -- 91 % -- -- -- -- -- --    09/08/24 2300 -- 84 -- 110/59 76 91 % -- -- -- -- -- --    09/08/24 2245 -- -- -- -- -- -- -- -- -- -- 12 --    09/08/24 22:37:26 -- 86 -- 108/58 75 92 % -- -- -- -- -- --    09/08/24 2237 98.7 °F (37.1 °C) 86 20 108/58 75 91 % 28 2 L/min Nasal cannula Sitting -- --    09/08/24 2133 -- 78 -- -- -- -- -- -- -- -- -- --    09/08/24 2118 -- -- -- -- -- 92 % -- -- None (Room air) -- 15 --    09/08/24 20:36:54 97.7 °F (36.5 °C) 94 17 138/62 87 93 % -- -- -- -- -- --    09/08/24 2032 -- -- -- -- -- -- -- -- -- -- -- 8 09/08/24 1938 -- -- -- -- -- -- -- -- -- -- -- 7 09/08/24 1900 -- 93 -- 156/84 113 98 % -- -- -- -- -- 7 09/08/24 1819 -- -- -- -- -- -- -- -- -- -- 15 -- 09/08/24 1800 98.4 °F (36.9 °C) 100 20 169/82 -- 94 % -- -- None (Room air) -- -- 10 - Worst Possible Pain              Pertinent Labs/Diagnostic Test Results:   Radiology:  XR chest portable   Final Interpretation by Nicole Figueroa MD (09/09 0833)      No acute cardiopulmonary disease.            Workstation performed: [id number]         CT spine thoracic and lumbar wo contrast   Final Interpretation by Don Thompson MD (09/08 1917)      S-shaped thoracolumbar scoliosis is again noted.      Superior L1 endplate compression fracture is again noted with increased interval loss of height (now 25 to 30%) and mild anterior wedging compared to the prior 7/30/2024 CT. There is new mild retropulsion causing approximately 25% canal compromise. There    is no new fracture or subluxation.               Workstation performed: [id number]         CT head without contrast   Final Interpretation by Don Thompson MD (09/08 1909)      No acute intracranial abnormality.                  Workstation performed: [id number]         XR pelvis ap only 1 or 2 vw   ED  Interpretation by Bladimir Mcmanus MD (09/08 1841)   No fracture      Final Interpretation by Nicole Figueroa MD (09/09 0832)      No acute osseous abnormality.         Computerized Assisted Algorithm (CAA) may have been used to analyze all applicable images.         Workstation performed: [id number]         XR sacrum and coccyx   ED Interpretation by Bladimir Mcmanus MD (09/08 1842)   No fracture      Final Interpretation by Nicole Figueroa MD (09/09 0832)      No acute osseous abnormality.         Computerized Assisted Algorithm (CAA) may have been used to analyze all applicable images.         Workstation performed: [id number]           Cardiology:  ECG 12 lead   Final Result by Keanu Mcneil MD (09/09 0745)   Normal sinus rhythm   Minimal voltage criteria for LVH, may be normal variant ( R in aVL )   Prolonged QT   Abnormal ECG   When compared with ECG of 08-SEP-2024 18:49, (unconfirmed)   QRS axis Shifted right   Nonspecific T wave abnormality no longer evident in Anterior leads   Confirmed by Keanu Mcneil ([id number]) on 9/9/2024 7:45:37 AM      ECG 12 lead   Final Result by Keanu Mcneil MD (09/09 0759)   Normal sinus rhythm   Left axis deviation   Nonspecific T wave abnormality   Abnormal ECG   When compared with ECG of 02-JUL-2024 10:28,   QRS axis Shifted left   Nonspecific T wave abnormality, worse in Inferior leads   Nonspecific T wave abnormality now evident in Anterior leads   Confirmed by Keanu Mcneil ([id number]) on 9/9/2024 7:59:13 AM           Results from last 7 days   Lab Units 09/08/24  2304   SARS-COV-2  Negative     Results from last 7 days   Lab Units 09/09/24  0550 09/08/24  1812   WBC Thousand/uL 2.33* 2.84*   HEMOGLOBIN g/dL 9.3* 10.3*   HEMATOCRIT % 29.5* 32.5*   PLATELETS Thousands/uL 55* 66*   BANDS PCT %  --  2        Results from last 7 days   Lab Units 09/09/24  0550 09/08/24  1812   SODIUM mmol/L 139 138   POTASSIUM mmol/L 3.3* 3.3*   CHLORIDE mmol/L 100 99   CO2 mmol/L 32 32    ANION GAP mmol/L 7 7   BUN mg/dL 16 12   CREATININE mg/dL 1.22 1.06   EGFR ml/min/1.73sq m 50 59   CALCIUM mg/dL 8.3* 8.6   MAGNESIUM mg/dL 2.0 2.0     Results from last 7 days   Lab Units 09/09/24  0550 09/08/24  1812   AST U/L 42* 47*   ALT U/L 16 17   ALK PHOS U/L 174* 198*   TOTAL PROTEIN g/dL 6.4 7.3   ALBUMIN g/dL 2.9* 3.3*   TOTAL BILIRUBIN mg/dL 1.33* 1.09*   AMMONIA umol/L 129* 166*     Results from last 7 days   Lab Units 09/08/24  2247   POC GLUCOSE mg/dl 113     Results from last 7 days   Lab Units 09/09/24  0550 09/08/24  1812   GLUCOSE RANDOM mg/dL 89 76        Results from last 7 days   Lab Units 09/08/24  2310   PH ART  7.434   PCO2 ART mm Hg 54.5*   PO2 ART mm Hg 71.2*   HCO3 ART mmol/L 35.7*   BASE EXC ART mmol/L 9.9   O2 CONTENT ART mL/dL 13.9*   O2 HGB, ARTERIAL % 92.6*   ABG SOURCE  Radial, Right        Results from last 7 days   Lab Units 09/08/24  1812   HS TNI 0HR ng/L 5        Results from last 7 days   Lab Units 09/08/24  1812   PROTIME seconds 16.0*   INR  1.25*   PTT seconds 33        Results from last 7 days   Lab Units 09/08/24  1812   LIPASE u/L 7*        Results from last 7 days   Lab Units 09/08/24  2304   INFLUENZA A PCR  Negative   INFLUENZA B PCR  Negative   RSV PCR  Negative          ED Treatment-Medication Administration from 09/08/2024 1751 to 09/08/2024 1956         Date/Time Order Dose Route Action     09/08/2024 1817 ketorolac (TORADOL) injection 15 mg 15 mg Intravenous Given     09/08/2024 1857 potassium chloride (Klor-Con M20) CR tablet 40 mEq 40 mEq Oral Given     09/08/2024 1938 fentaNYL injection 50 mcg 50 mcg Intravenous Given            Past Medical History:   Diagnosis Date    Back pain     Carcinoid tumor     neuroendocrine    CHF (congestive heart failure) (HCC)     Cirrhosis of liver (HCC)      Present on Admission:   Fall from standing   Hepatic encephalopathy (HCC)   Class 3 severe obesity due to excess calories in adult (HCC)   (Resolved) Chronic diastolic  congestive heart failure (HCC)      Admitting Diagnosis: Hepatic encephalopathy (HCC) [K76.82]  Hypokalemia [E87.6]  Back pain [M54.9]  Closed compression fracture of body of L1 vertebra (HCC) [S32.010A]    Age/Sex: 53 y.o. female    Admission Orders: SCDs; I/O; cardiac diet; spine brace    Scheduled Medications:  amitriptyline, 75 mg, Oral, HS  Cholecalciferol, 5,000 Units, Oral, Weekly  enoxaparin, 40 mg, Subcutaneous, Daily  folic acid, 1 mg, Oral, Daily  lactulose, 30 g, Oral, 4x Daily  lactulose, 200 g, Rectal, Once  lidocaine, 1 patch, Topical, Daily  potassium chloride, 40 mEq, Oral, Once  rifaximin, 550 mg, Oral, Q12H MIGUEL  sertraline, 100 mg, Oral, Daily  spironolactone, 50 mg, Oral, Daily  torsemide, 20 mg, Oral, BID AC    furosemide (LASIX) injection 40 mg  Dose: 40 mg  Freq: Once Route: IV  Start: 09/08/24 2315 End: 09/08/24 2311      Continuous IV Infusions: None     PRN Meds: None       Network Utilization Review Department  ATTENTION: Please call with any questions or concerns to [phone] and carefully listen to the prompts so that you are directed to the right person. All voicemails are confidential.   For Discharge needs, contact Care Management DC Support Team at [phone] opt. [phone]  Send all requests for admission clinical reviews, approved or denied determinations and any other requests to dedicated fax number below belonging to the [facility] [facility] where the patient is receiving treatment. List of dedicated fax numbers for the Facilities:  FACILITY NAME UR FAX NUMBER   ADMISSION DENIALS (Administrative/Medical Necessity) [phone]   DISCHARGE SUPPORT TEAM (NETWORK) [phone]   PARENT CHILD HEALTH (Maternity/NICU/Pediatrics) [phone]   [facility] [phone]   [facility] [phone]   [facility] [phone]   [facility] [phone]   [facility] [phone]    [facility] [phone]   [facility] [phone]   [facility] [phone]   [facility] [phone]   [facility] [phone]   [facility] [phone]   [facility] [phone]

## 2024-09-09 NOTE — ASSESSMENT & PLAN NOTE
"Patient with history of Larsen cirrhosis-follows with [facility] hepatology/gastroenterology  Maintained in outpatient setting on 3 times daily to 4 times daily lactulose as well as Aldactone  Reported compliance  Elevated ammonia upon presentation with concurrent \"confusion\" per daughter  Increase lactulose dose and also Xifaxan.  Patient states she has not had a bowel movement in a few days.  Patient also reportedly had a rapid response secondary to increased confusion overnight    "

## 2024-09-09 NOTE — NURSING NOTE
Pt o2 desating in the 80s placed on 2L NC. O2 sats increased into the 90s, lungs audibly coarse and congested. Attempted suction with yankauer, no secretions removed.   Pt change in mental status from admission, unable to follow commands, pupils equal and reactive, VSS. RRT called. New orders obtained.

## 2024-09-09 NOTE — CASE MANAGEMENT
Case Management Assessment & Discharge Planning Note    Patient name Bailey Olsen  Location /-01 MRN [id number]  : 1970 Date 2024       Current Admission Date: 2024  Current Admission Diagnosis:Fall from standing   Patient Active Problem List    Diagnosis Date Noted Date Diagnosed    Fall from standing 2024     Closed compression fracture of L1 lumbar vertebra, sequela 2024     Closed stable burst fracture of first lumbar vertebra with routine healing 2024     Groin hematoma 2024     Ambulatory dysfunction 2024     Intractable back pain 2024     Hyperammonemia (HCC) 2024     Hepatic encephalopathy (HCC) 2024     Fall 2024     Other cirrhosis of liver (HCC) 2024     History of malignant carcinoid tumor of small intestine 2024     Class 3 severe obesity due to excess calories in adult (HCC) 2024     Primary hypertension 2024     Anxiety 2024     Hypokalemia 2024     Fibromyalgia 2024     Iron deficiency anemia 2024     Pancytopenia (HCC) 2024     Sarcoidosis 2024     Bilateral pulmonary infiltrates 2024       LOS (days): 1  Geometric Mean LOS (GMLOS) (days):   Days to GMLOS:     OBJECTIVE:    Risk of Unplanned Readmission Score: 25.52         Current admission status: Inpatient  Referral Reason:  (dc planning)    Preferred Pharmacy:   [facility] [address] [place] [address] - [address] [facility]  [address] [facility]   Phone: [phone] Fax: [phone]    [facility]/pharm[facility] #[id number] - [place], [place] - [address] [address]  Phone: [phone] Fax: [phone]    Primary Care Provider: Nayely Brown DO    Primary Insurance: [facility]  Secondary Insurance:     CM met with patient at the bedside,baseline information  was obtained. CM discussed the role of CM in helping the patient  develop a discharge plan and assist the patient in carry out their plan.  - fell at home    ASSESSMENT:  Active Health Care Proxies       Sherriafsaneh Cristian Health Care Representative - Spouse   Primary Phone: [phone] (Mobile)                 Advance Directives  Does patient have a Health Care POA?: No  Was patient offered paperwork?: No (provided information in July on POA/AD)  Does patient currently have a Health Care decision maker?: Yes, please see Health Care Proxy section  Does patient have Advance Directives?: No  Was patient offered paperwork?: No  Primary Contact: Cristian Olsen (Spouse)  [phone]         Readmission Root Cause  30 Day Readmission: No    Patient Information  Admitted from:: Home  Mental Status: Alert  During Assessment patient was accompanied by: Not accompanied during assessment  Assessment information provided by:: Patient  Primary Caregiver: Self  Support Systems: Spouse/significant other, Children  [facility] of Residence: [facility]  What city do you live in?: [place]  Home entry access options. Select all that apply.: Stairs  Number of steps to enter home.: 1  Type of Current Residence: [address] home  Upon entering residence, is there a bedroom on the main floor (no further steps)?: Yes  Upon entering residence, is there a bathroom on the main floor (no further steps)?: Yes  Living Arrangements: Lives w/ Spouse/significant other, Lives w/ Daughter  Is patient a ?: No    Activities of Daily Living Prior to Admission  Functional Status: Independent  Completes ADLs independently?: Yes  Ambulates independently?: Yes  Does patient use assisted devices?: Yes  Assisted Devices (DME) used: Walker  Does patient currently own DME?: Yes  What DME does the patient currently own?: Walker  Does patient have a history of Outpatient Therapy (PT/OT)?: No  Does the patient have a history of Short-Term Rehab?: Yes ([facility] and [facility])  Does patient have a history of HHC?: Yes  (Critical access hospital)  Does patient currently have C?: No         Patient Information Continued  Income Source: Unemployed  Does patient have prescription coverage?: Yes  Does patient receive dialysis treatments?: No  Does patient have a history of substance abuse?: No  Does patient have a history of Mental Health Diagnosis?: Yes  Is patient receiving treatment for mental health?: Yes (depression on medications)  Has patient received inpatient treatment related to mental health in the last 2 years?: No         Means of Transportation  Means of Transport to [facility]:: Family transport      Social Determinants of Health (SDOH)      Flowsheet Row Most Recent Value   Housing Stability    In the last 12 months, was there a time when you were not able to pay the mortgage or rent on time? N   In the past 12 months, how many times have you moved where you were living? 0   At any time in the past 12 months, were you homeless or living in a shelter (including now)? N   Transportation Needs    In the past 12 months, has lack of transportation kept you from medical appointments or from getting medications? no   In the past 12 months, has lack of transportation kept you from meetings, work, or from getting things needed for daily living? No   Food Insecurity    Within the past 12 months, you worried that your food would run out before you got the money to buy more. Never true   Within the past 12 months, the food you bought just didn't last and you didn't have money to get more. Never true   Utilities    In the past 12 months has the electric, gas, oil, or water company threatened to shut off services in your home? No            DISCHARGE DETAILS:    Discharge planning discussed with:: patient  Freedom of Choice: Yes     CM contacted family/caregiver?: No- see comments      CM will follow for dc needs, therapy evaluation is pending.        DC plan to be determined.

## 2024-09-09 NOTE — ASSESSMENT & PLAN NOTE
"Patient presents today (9/8) after sustaining a mechanical fall at home earlier today (approximately 8 hours prior to arrival)  Per patient-tripped over her cat resulting in a fall onto her buttocks-denies head strike-notes acute on chronic low back pain  Patient without complaints of urinary or bowel incontinence/saddle anesthesia  Notes recent history of L1 compression fracture (7/2024)  Patient denies loss of consciousness, lightheadedness, dizziness, aura  Patient daughter reports that patient is confused and \"out of it\"-similar to when experienced hepatic encephalopathy in the past (see below)    In ED, patient without SIRS criteria, without oxygen requirement  Metabolic profile most significant for elevated ammonia-hematologic profile unrevealing  Patient hemodynamically stable  Underwent CT lumbar spine with results as follows:   Superior L1 endplate compression fracture is again noted with increased interval loss of height (now 25 to 30%)  Mild anterior wedging compared to the prior 7/30/2024 CT.   There is new mild retropulsion causing approximately 25% canal compromise.   There is no new fracture or subluxation.    Plan:  Admit to hospitalist service  Consultation to physical and Occupational Therapy  TLSO brace  Was evaluated by neurosurgery at time of initial L1 compression fracture-not surgical candidate  Patient without neurologic compromise-saddle anesthesia/urinary or bowel incontinence-will place consultation to neurosurgery as will require follow-up in the outpatient setting  Low index suspicion for syncopal or neurologic event given patient history  Fall precautions    "

## 2024-09-09 NOTE — H&P
"[facility]  H&P  Name: Bailey Olsen 53 y.o. female I MRN: [id number]  Unit/Bed#: -01 I Date of Admission: 9/8/2024   Date of Service: 9/8/2024 I Hospital Day: 0      Assessment & Plan   * Fall from standing  Assessment & Plan  Patient presents today (9/8) after sustaining a mechanical fall at home earlier today (approximately 8 hours prior to arrival)  Per patient-tripped over her cat resulting in a fall onto her buttocks-denies head strike-notes acute on chronic low back pain  Patient without complaints of urinary or bowel incontinence/saddle anesthesia  Notes recent history of L1 compression fracture (7/2024)  Patient denies loss of consciousness, lightheadedness, dizziness, aura  Patient daughter reports that patient is confused and \"out of it\"-similar to when experienced hepatic encephalopathy in the past (see below)    In ED, patient without SIRS criteria, without oxygen requirement  Metabolic profile most significant for elevated ammonia-hematologic profile unrevealing  Patient hemodynamically stable  Underwent CT lumbar spine with results as follows:   Superior L1 endplate compression fracture is again noted with increased interval loss of height (now 25 to 30%)  Mild anterior wedging compared to the prior 7/30/2024 CT.   There is new mild retropulsion causing approximately 25% canal compromise.   There is no new fracture or subluxation.    Plan:  Admit to hospitalist service  Consultation to physical and Occupational Therapy  TLSO brace  Was evaluated by neurosurgery at time of initial L1 compression fracture-not surgical candidate  Patient without neurologic compromise-saddle anesthesia/urinary or bowel incontinence-will place consultation to neurosurgery as will require follow-up in the outpatient setting  Low index suspicion for syncopal or neurologic event given patient history  Fall precautions      Closed compression fracture of L1 lumbar vertebra, " "sequela  Assessment & Plan  Plan as above    Hepatic encephalopathy (HCC)  Assessment & Plan  Patient with history of Larsen cirrhosis-follows with [facility] hepatology[facility]gastroenterology  Maintained in outpatient setting on 3 times daily to 4 times daily lactulose as well as Aldactone  Reported compliance  Elevated ammonia upon presentation with concurrent \"confusion\" per daughter  Patient without neurologic deficits-index suspicion for hepatic encephalopathy-mild      Class 3 severe obesity due to excess calories in adult (HCC)  Assessment & Plan  Recommend incorporating a more whole foods plant-predominant diet along with decreasing consumption of red meats and processed foods  Per AHA guidelines, recommend moderate-vigorous intensity exercise for 30 minutes a day for 5 days a week or a total of 150 min/week    VTE Prophylaxis: Enoxaparin (Lovenox)  / sequential compression device   Code Status: Full  POLST: There is no POLST form on file for this patient (pre-hospital)    Anticipated Length of Stay:  Patient will be admitted on an Inpatient basis with an anticipated length of stay of  > 2 midnights.   Justification for Hospital Stay: Acute encephalopathy    Total Time for Visit, including Counseling / Coordination of Care: 45 minutes.  Greater than 50% of this total time spent on direct patient counseling and coordination of care.    Chief Complaint:   Fall    History of Present Illness:    Bailey Olsen is a 53 y.o. female with past medical history significant for Larsen cirrhosis, obesity, hypertension, anxiety, fibromyalgia, chronic pain, who presents this evening after a mechanical fall at home.    Patient poor historian.  Per report, patient tripped over her cat.  Patient's daughter noted no evidence of injury however noted that mom was \"confused\".  Without reports of shaking behavior, loss of consciousness, tongue biting, urinary or bowel incontinence, described as \"confused\".    Patient does carry history of Larsen " cirrhosis with history of hepatic encephalopathy-Per daughter, similar presentation.    Patient will be admitted for further evaluation and management.  Review of Systems:    Review of Systems   All other systems reviewed and are negative.      Past Medical and Surgical History:     Past Medical History:   Diagnosis Date    Back pain     Carcinoid tumor     neuroendocrine    CHF (congestive heart failure) (HCC)     Cirrhosis of liver (HCC)        History reviewed. No pertinent surgical history.    Meds/Allergies:    Prior to Admission medications    Medication Sig Start Date End Date Taking? Authorizing Provider   amitriptyline (ELAVIL) 75 mg tablet Take 1 tablet (75 mg total) by mouth daily at bedtime 7/8/24  Yes Mar Oliva MD   Cholecalciferol (Vitamin D-3) 125 MCG (5000 UT) TABS Take 1 tablet by mouth once a week Tuesday   Yes Historical Provider, MD   folic acid (FOLVITE) 1 mg tablet Take 1 mg by mouth daily 8/4/16 2/28/25 Yes Historical Provider, MD   gabapentin (NEURONTIN) 100 mg capsule Take 2 capsules (200 mg total) by mouth 3 (three) times a day 8/3/24 9/8/24 Yes Ruslan Keyes MD   hydroxychloroquine (PLAQUENIL) 200 mg tablet Take 200 mg by mouth 2 (two) times a day with meals   Yes Historical Provider, MD   lactulose (CHRONULAC) 10 g/15 mL solution Take 30 mL (20 g total) by mouth 4 (four) times a day Goal bowel movements is 4 a day 6/18/24 9/8/24 Yes Kristyn Rodriguez PA-C   lidocaine (LIDODERM) 5 % Apply 1 patch topically over 12 hours daily Remove & Discard patch within 12 hours or as directed by MD 8/4/24  Yes Ruslan Keyes MD   oxyCODONE (ROXICODONE) 10 MG TABS Take 1 tablet (10 mg total) by mouth every 6 (six) hours as needed for moderate pain Max Daily Amount: 40 mg 8/3/24  Yes Ruslan Keyes MD   polyethylene glycol (MIRALAX) 17 g packet Take 17 g by mouth if needed (constipation) for up to 10 days 7/8/24 9/8/24 Yes Mar Oliva MD   sertraline (ZOLOFT) 100 mg tablet Take 1  "tablet (100 mg total) by mouth daily 7/8/24  Yes Mar Oliva MD   spironolactone (ALDACTONE) 50 mg tablet Take 1 tablet (50 mg total) by mouth daily 6/3/24 9/8/24 Yes Idalia Stuart MD   tiZANidine (ZANAFLEX) 4 mg tablet Take 1 tablet (4 mg total) by mouth 3 (three) times a day 8/3/24 9/8/24 Yes Ruslan Keyes MD   torsemide (DEMADEX) 20 mg tablet Take 1 tablet (20 mg total) by mouth 2 (two) times a day before meals 7/8/24 9/8/24 Yes Mar Oliva MD     I have reviewed home medications with a medical source (PCP, Pharmacy, other).    Allergies:   Allergies   Allergen Reactions    Zolpidem GI Intolerance, Other (See Comments) and Hives     Other Reaction(s): Other      Blacks out    Blacks out    Tapentadol Sneezing and Other (See Comments)     Zoned out    Alprazolam Other (See Comments)     Other Reaction(s): Other      Feels zoned out    Feels zoned out    Amoxicillin-Pot Clavulanate Hives and Nausea Only     Amoxil fine    Morphine Nausea Only and Headache    Tramadol GI Intolerance    Azelastine Rash    Azelastine Hcl Rash    Molds & Smuts Rash     Other Reaction(s): Unknown       Social History:     Marital Status: /Civil Union   Substance Use History:   Social History     Substance and Sexual Activity   Alcohol Use Never     Social History     Tobacco Use   Smoking Status Never   Smokeless Tobacco Never     Social History     Substance and Sexual Activity   Drug Use Never       Family History:    non-contributory    Physical Exam:     Vitals:   Blood Pressure: 138/62 (09/08/24 2036)  Pulse: 94 (09/08/24 2036)  Temperature: 97.7 °F (36.5 °C) (09/08/24 2036)  Temp Source: Temporal (09/08/24 1800)  Respirations: 17 (09/08/24 2036)  Height: 5' 5\" (165.1 cm) (09/08/24 2032)  Weight - Scale: 126 kg (276 lb 14.4 oz) (09/08/24 2032)  SpO2: 93 % (09/08/24 2036)    Physical Exam  Constitutional:       General: She is not in acute distress.     Appearance: She is obese. She is ill-appearing. She is not " toxic-appearing or diaphoretic.   HENT:      Head: Normocephalic and atraumatic.      Right Ear: External ear normal.      Left Ear: External ear normal.      Nose: Nose normal.      Mouth/Throat:      Mouth: Mucous membranes are dry.      Pharynx: Oropharynx is clear.   Eyes:      General: No scleral icterus.  Cardiovascular:      Rate and Rhythm: Normal rate and regular rhythm.      Pulses: Normal pulses.      Heart sounds: No murmur heard.  Pulmonary:      Effort: No respiratory distress.      Breath sounds: No stridor. Wheezing and rales present. No rhonchi.   Chest:      Chest wall: No tenderness.   Abdominal:      General: Abdomen is flat. Bowel sounds are normal. There is no distension.      Palpations: Abdomen is soft.      Tenderness: There is no abdominal tenderness. There is no guarding.   Musculoskeletal:         General: Normal range of motion.      Cervical back: Normal range of motion.   Skin:     General: Skin is warm.      Coloration: Skin is not jaundiced.   Neurological:      General: No focal deficit present.      Mental Status: She is alert. She is disoriented.      Cranial Nerves: No cranial nerve deficit.      Sensory: No sensory deficit.      Motor: Weakness present.       Additional Data:     Lab Results: I have personally reviewed pertinent reports.      Results from last 7 days   Lab Units 09/08/24  1812   WBC Thousand/uL 2.84*   HEMOGLOBIN g/dL 10.3*   HEMATOCRIT % 32.5*   PLATELETS Thousands/uL 66*   LYMPHO PCT % 26   MONO PCT % 5   EOS PCT % 3     Results from last 7 days   Lab Units 09/08/24  1812   POTASSIUM mmol/L 3.3*   CHLORIDE mmol/L 99   CO2 mmol/L 32   BUN mg/dL 12   CREATININE mg/dL 1.06   CALCIUM mg/dL 8.6   ALK PHOS U/L 198*   ALT U/L 17   AST U/L 47*     Results from last 7 days   Lab Units 09/08/24  1812   INR  1.25*       Imaging: I have personally reviewed pertinent reports.      CT spine thoracic and lumbar wo contrast    Result Date: 9/8/2024  Narrative: CT THORACIC AND  LUMBAR SPINE INDICATION:   fall, known L1 fracture. COMPARISON: CT abdomen/pelvis 7/30/2024 TECHNIQUE:  Contiguous axial images were obtained. Sagittal and coronal reconstructions were performed. Radiation dose length product (DLP) for this visit:  3386.42 mGy-cm .  This examination, like all CT scans performed in the [facility], was performed utilizing techniques to minimize radiation dose exposure, including the use of iterative reconstruction and automated exposure control. IMAGE QUALITY:  Diagnostic. FINDINGS: ALIGNMENT: S-shaped thoracolumbar scoliosis is again noted. VERTEBRAE: Superior L1 endplate compression fracture is again noted with increased interval loss of height (now 25 to 30%) and mild anterior wedging. There is new mild retropulsion causing approximately 25% canal compromise. There is no new fracture or subluxation. DEGENERATIVE CHANGES:  Mild multilevel degenerative disc disease. PARASPINAL SOFT TISSUES: Mild fatty atrophy of the posterior paraspinal muscles. OTHER: Unremarkable     Impression: S-shaped thoracolumbar scoliosis is again noted. Superior L1 endplate compression fracture is again noted with increased interval loss of height (now 25 to 30%) and mild anterior wedging compared to the prior 7/30/2024 CT. There is new mild retropulsion causing approximately 25% canal compromise. There  is no new fracture or subluxation. Workstation performed: [id number]     CT head without contrast    Result Date: 9/8/2024  Narrative: CT BRAIN - WITHOUT CONTRAST INDICATION:   Fall. COMPARISON: CT head 7/18/2024 TECHNIQUE:  CT examination of the brain was performed.  Multiplanar 2D reformatted images were created from the source data. Radiation dose length product (DLP) for this visit:  883.26 mGy-cm .  This examination, like all CT scans performed in the [facility], was performed utilizing techniques to minimize radiation dose exposure, including the use of iterative   reconstruction and automated exposure control. IMAGE QUALITY:  Diagnostic. FINDINGS: PARENCHYMA:  No intracranial mass, mass effect or midline shift. No CT signs of acute infarction.  No acute parenchymal hemorrhage. VENTRICLES AND EXTRA-AXIAL SPACES:  Normal for the patient's age. VISUALIZED ORBITS: Normal visualized orbits. PARANASAL SINUSES: Normal visualized paranasal sinuses. CALVARIUM AND EXTRACRANIAL SOFT TISSUES:  Normal.     Impression: No acute intracranial abnormality. Workstation performed: [id number]     XR spine lumbar 2 or 3 views injury    Result Date: 8/19/2024  Narrative: XR SPINE LUMBAR 2 OR 3 VIEWS INJURY INDICATION: S32.010A: Wedge compression fracture of first lumbar vertebra, initial encounter for closed fracture. COMPARISON: 7/31/2024, CT thoracolumbar spine 7/18/2024 Views: 3 FINDINGS: Superimposed back brace again noted. Mild superior endplate compression fracture of L1 with mild vertebral body height loss similar to the most recent radiograph. Intact pedicles. Five non-rib-bearing lumbar vertebral bodies. Normal alignment. Lower lumbar facet arthropathy. Unremarkable soft tissues. Surgical clips upper abdomen.     Impression: L1 compression fracture with mild vertebral body height loss, unchanged compared to most recent radiograph. Workstation performed: [id number]       EKG, Pathology, and Other Studies Reviewed on Admission:   EKG:     Epic / Care Everywhere Records Reviewed: Yes     ** Please Note: This note has been constructed using a voice recognition system. **

## 2024-09-09 NOTE — PLAN OF CARE
Problem: Potential for Falls  Goal: Patient will remain free of falls  Description: INTERVENTIONS:  - Educate patient/family on patient safety including physical limitations  - Instruct patient to call for assistance with activity   - Consult OT/PT to assist with strengthening/mobility   - Keep Call bell within reach  - Keep bed low and locked with side rails adjusted as appropriate  - Keep care items and personal belongings within reach  - Initiate and maintain comfort rounds  - Make Fall Risk Sign visible to staff  - Apply yellow socks and bracelet for high fall risk patients  - Consider moving patient to room near nurses station  Outcome: Progressing     Problem: PAIN - ADULT  Goal: Verbalizes/displays adequate comfort level or baseline comfort level  Description: Interventions:  - Encourage patient to monitor pain and request assistance  - Assess pain using appropriate pain scale  - Administer analgesics based on type and severity of pain and evaluate response  - Implement non-pharmacological measures as appropriate and evaluate response  - Consider cultural and social influences on pain and pain management  - Notify physician/advanced practitioner if interventions unsuccessful or patient reports new pain  Outcome: Progressing     Problem: INFECTION - ADULT  Goal: Absence or prevention of progression during hospitalization  Description: INTERVENTIONS:  - Assess and monitor for signs and symptoms of infection  - Monitor lab/diagnostic results  - Monitor all insertion sites, i.e. indwelling lines, tubes, and drains  - Monitor endotracheal if appropriate and nasal secretions for changes in amount and color  - [place] appropriate cooling/warming therapies per order  - Administer medications as ordered  - Instruct and encourage patient and family to use good hand hygiene technique  - Identify and instruct in appropriate isolation precautions for identified infection/condition  Outcome: Progressing  Goal: Absence  of fever/infection during neutropenic period  Description: INTERVENTIONS:  - Monitor WBC    Outcome: Progressing     Problem: SAFETY ADULT  Goal: Patient will remain free of falls  Description: INTERVENTIONS:  - Educate patient/family on patient safety including physical limitations  - Instruct patient to call for assistance with activity   - Consult OT/PT to assist with strengthening/mobility   - Keep Call bell within reach  - Keep bed low and locked with side rails adjusted as appropriate  - Keep care items and personal belongings within reach  - Initiate and maintain comfort rounds  - Make Fall Risk Sign visible to staff  - Apply yellow socks and bracelet for high fall risk patients  - Consider moving patient to room near nurses station  Outcome: Progressing  Goal: Maintain or return to baseline ADL function  Description: INTERVENTIONS:  -  Assess patient's ability to carry out ADLs; assess patient's baseline for ADL function and identify physical deficits which impact ability to perform ADLs (bathing, care of mouth/teeth, toileting, grooming, dressing, etc.)  - Assess/evaluate cause of self-care deficits   - Assess range of motion  - Assess patient's mobility; develop plan if impaired  - Assess patient's need for assistive devices and provide as appropriate  - Encourage maximum independence but intervene and supervise when necessary  - Involve family in performance of ADLs  - Assess for home care needs following discharge   - Consider OT consult to assist with ADL evaluation and planning for discharge  - Provide patient education as appropriate  Outcome: Progressing  Goal: Maintains/Returns to pre admission functional level  Description: INTERVENTIONS:  - Perform AM-PAC 6 Click Basic Mobility/ Daily Activity assessment daily.  - Set and communicate daily mobility goal to care team and patient/family/caregiver.   - Collaborate with rehabilitation services on mobility goals if consulted  - Record patient progress  and toleration of activity level   Outcome: Progressing     Problem: DISCHARGE PLANNING  Goal: Discharge to home or other facility with appropriate resources  Description: INTERVENTIONS:  - Identify barriers to discharge w/patient and caregiver  - Arrange for needed discharge resources and transportation as appropriate  - Identify discharge learning needs (meds, wound care, etc.)  - Arrange for interpretive services to assist at discharge as needed  - Refer to Case Management Department for coordinating discharge planning if the patient needs post-hospital services based on physician/advanced practitioner order or complex needs related to functional status, cognitive ability, or social support system  Outcome: Progressing     Problem: Knowledge Deficit  Goal: Patient/family/caregiver demonstrates understanding of disease process, treatment plan, medications, and discharge instructions  Description: Complete learning assessment and assess knowledge base.  Interventions:  - Provide teaching at level of understanding  - Provide teaching via preferred learning methods  Outcome: Progressing     Problem: GASTROINTESTINAL - ADULT  Goal: Minimal or absence of nausea and/or vomiting  Description: INTERVENTIONS:  - Administer IV fluids if ordered to ensure adequate hydration  - Maintain NPO status until nausea and vomiting are resolved  - Nasogastric tube if ordered  - Administer ordered antiemetic medications as needed  - Provide nonpharmacologic comfort measures as appropriate  - Advance diet as tolerated, if ordered  - Consider nutrition services referral to assist patient with adequate nutrition and appropriate food choices  Outcome: Progressing  Goal: Maintains or returns to baseline bowel function  Description: INTERVENTIONS:  - Assess bowel function  - Encourage oral fluids to ensure adequate hydration  - Administer IV fluids if ordered to ensure adequate hydration  - Administer ordered medications as needed  -  Encourage mobilization and activity  - Consider nutritional services referral to assist patient with adequate nutrition and appropriate food choices  Outcome: Progressing  Goal: Maintains adequate nutritional intake  Description: INTERVENTIONS:  - Monitor percentage of each meal consumed  - Identify factors contributing to decreased intake, treat as appropriate  - Assist with meals as needed  - Monitor I&O, weight, and lab values if indicated  - Obtain nutrition services referral as needed  Outcome: Progressing

## 2024-09-09 NOTE — ASSESSMENT & PLAN NOTE
"Patient with history of [place] cirrhosis-follows with [facility] hepatology/gastroenterology  Maintained in outpatient setting on 3 times daily to 4 times daily lactulose as well as Aldactone  Reported compliance  Elevated ammonia upon presentation with concurrent \"confusion\" per daughter  Patient without neurologic deficits-index suspicion for hepatic encephalopathy-mild    "

## 2024-09-09 NOTE — CASE MANAGEMENT
Case Management Discharge Planning Note    Patient name Bailey Olsen  Location /-01 MRN [id number]  : 1970 Date 2024       Current Admission Date: 2024  Current Admission Diagnosis:Fall from standing   Patient Active Problem List    Diagnosis Date Noted Date Diagnosed    Fall from standing 2024     Closed compression fracture of L1 lumbar vertebra, sequela 2024     Closed stable burst fracture of first lumbar vertebra with routine healing 2024     Groin hematoma 2024     Ambulatory dysfunction 2024     Intractable back pain 2024     Hyperammonemia (HCC) 2024     Hepatic encephalopathy (HCC) 2024     Fall 2024     Other cirrhosis of liver (HCC) 2024     History of malignant carcinoid tumor of small intestine 2024     Class 3 severe obesity due to excess calories in adult (HCC) 2024     Primary hypertension 2024     Anxiety 2024     Hypokalemia 2024     Fibromyalgia 2024     Iron deficiency anemia 2024     Pancytopenia (HCC) 2024     Sarcoidosis 2024     Bilateral pulmonary infiltrates 2024       LOS (days): 1  Geometric Mean LOS (GMLOS) (days):   Days to GMLOS:     OBJECTIVE:  Risk of Unplanned Readmission Score: 25.81         Current admission status: Inpatient   Preferred Pharmacy:   [facility] [address] [place] [address] - [address] [facility]  [address] [facility]   Phone: [phone] Fax: [phone]    [facility]pharmacy #[id number] [place], [place] - [address] [facility]  [address] [place] [address]   Phone: [phone] Fax: [phone]    Primary Care Provider: Nayely Brown DO    Primary Insurance: [facility]  Secondary Insurance:     DISCHARGE DETAILS:     Therapy recommends STR.  Freedom of choice: Yes  Discussed with patient that the recommendation was made for STR, pt in agreement for a blanket referral  to see which facility can manage her care needs and accept her insurance.  Await therapy documentation and a AIDIN referral will be made.

## 2024-09-09 NOTE — PHYSICAL THERAPY NOTE
PHYSICAL THERAPY EVALUATION  NAME:  Bailey Olsen  DATE: 09/09/24    AGE:   53 y.o.  Mrn:   [id number]  ADMIT DX:  Hepatic encephalopathy (HCC) [K76.82]  Hypokalemia [E87.6]  Back pain [M54.9]  Closed compression fracture of body of L1 vertebra (HCC) [S32.010A]    Past Medical History:   Diagnosis Date    Back pain     Carcinoid tumor     neuroendocrine    CHF (congestive heart failure) (HCC)     Cirrhosis of liver (HCC)      Length Of Stay: 1  Performed at least 2 patient identifiers during session: Name and Birthday  PHYSICAL THERAPY EVALUATION :    09/09/24 1000   PT Last Visit   PT Visit Date 09/09/24   Note Type   Note type Evaluation   Pain Assessment   Pain Assessment Tool 0-10   Pain Location/Orientation Orientation: Lower;Location: Back   Pain Rating: FLACC (Rest) - Face 0   Pain Rating: FLACC (Rest) - Legs 0   Pain Rating: FLACC (Rest) - Activity 0   Pain Rating: FLACC (Rest) - Cry 0   Pain Rating: FLACC (Rest) - Consolability 0   Score: FLACC (Rest) 0   Pain Rating: FLACC (Activity) - Face 1   Pain Rating: FLACC (Activity) - Legs 0   Pain Rating: FLACC (Activity) - Activity 0   Pain Rating: FLACC (Activity) - Cry 1   Pain Rating: FLACC (Activity) - Consolability 0   Score: FLACC (Activity) 2   Restrictions/Precautions   Braces or Orthoses TLSO  (summit adjustable 456 TLSO brace fitted to patient on 7/31/24-pt has, spouse bringing to patient. noted [place] LSO in room-not appropriate for patient. LSO brace removed from room.)   Other Precautions Cognitive;Chair Alarm;Bed Alarm;Fall Risk;Pain  (O2 at 1 lpm NC)   Home Living   Type of Home House  (1 FABRICIO)   Home Layout Two level;Performs ADLs on one level;Able to live on main level with bedroom/bathroom  (FF R HR if going upstairs, but doesn't have to)   Bathroom Shower/Tub Tub/shower unit   Bathroom Toilet Standard   Bathroom Equipment Grab bars in shower;Grab bars around toilet   Home Equipment Walker  (rollator-wasn't using an AD PTA but if needed to  "was using RW prior to last admission)   Additional Comments Pt poor historian with cognitive deficits. Pt unable to generate home living or PLOF at this time. Per previous admission, patient lives in a 2SH with 1 FABRICIO and 1st floor set up. Doesn't use an AD PTA, but will use RW prn prior to most recent admission.   Prior Function   Level of [place] Independent with ADLs;Independent with functional mobility   Lives With Spouse;Daughter   Receives Help From Family   IADLs Independent with medication management;Family/Friend/Other provides transportation;Family/Friend/Other provides meals   Falls in the last 6 months (S)  5 to 10  (pt reporting 3 falls, however previous admission was 5 to 10)   Comments Reports being independent with mobility, ADLs and IADLs but has assistance for community mobility   General   Additional Pertinent History Pt admitted 7/2/24 to 7/8/24 with acute on chronic CHF, hyperammonemia, again on 7/18-7/19/24 s/p fall, hepatic encephalopathy, intractable back pain and then again 7/30/24 to 8/3/24 with ambulatory dysfunction, intractable back pain, and finding of L1 superior endplate and lower sacral segment fracture L1 superior endplate and lower sacral segment fracture -TLSO brace ordered and provided. [place] 456 adjustable TLSO brace fitted to patient on 7/31/24. inc B LE edema noted. Per neurosurgery note on 8/19/24: Continue use of TLSO brace when OOB. Continue limited activities to include no bending/twisting or heavy lifting.    Cognition   Arousal/Participation Lethargic   Attention Difficulty attending to directions   Orientation Level Oriented to person;Oriented to place;Disoriented to time;Disoriented to situation   Following Commands Follows one step commands inconsistently   Subjective   Subjective \"My  is Cristian.\"   RLE Assessment   RLE Assessment   (minimal AROM bilateral LEs, PROM WNL. ankle DF to neutral 2/5 otherwise strength 2-/5)   LLE Assessment   LLE Assessment "   (minimal AROM bilateral LEs, ankle DF < neutral, PROM WNL. strength 2-/5)   Light Touch   RLE Light Touch Impaired   RLE Light Touch Comments diminished distally   LLE Light Touch Impaired   LLE Light Touch Comments diminished distally   Bed Mobility   Rolling R 2  Maximal assistance   Additional items Assist x 1;Increased time required;Verbal cues;Bedrails   Rolling L 2  Maximal assistance   Additional items Assist x 1;Increased time required;Verbal cues   Supine to Sit 2  Maximal assistance   Additional items Assist x 1;HOB elevated;Increased time required;Verbal cues  (HOB elevated ~ 30 degrees)   Sit to Supine 2  Maximal assistance   Additional items Assist x 2;Increased time required;Verbal cues;LE management  (trunk management)   Additional Comments HOB elevated ~ 30 degrees. required maxAx1 to achieve sitting EOB wiht max verbal cues for sequence and technique and manual cues for trunk management. returned to supine with maxAx2 for trunk and LE management. rolling right and elft wiht HOB flat with maxAx1 with manual cues for trunk and verbal cues for tehcnique. sitting balance on EOB with maxAx1 to minAx1 at best with right lateral and posterior lean. pt lethargic, cues for increased alertness. pt with right focus with decreased attention to left side. head turned toward right.   Transfers   Sit to Stand Unable to assess   Additional Comments unable to assess due to safety concerns with poor sitting balance, increased confusion.   Balance   Static Sitting Poor   Dynamic Sitting Poor -   Activity Tolerance   Activity Tolerance Patient limited by fatigue  (impaired cognition)   Medical Staff Made Aware Anne DAVALOS   Assessment   Prognosis Fair   Problem List Decreased strength;Decreased endurance;Impaired balance;Decreased mobility;Decreased cognition;Impaired judgement;Decreased safety awareness;Obesity;Pain   Barriers to Discharge Inaccessible home environment;Decreased caregiver support   Barriers to  "Discharge Comments requires assistance to complete mobility, safety concerns   Goals   Patient Goals \"go home\"   [facility] Expiration Date 09/23/24   PT Treatment Day 0   Plan   Treatment/Interventions ADL retraining;Functional transfer training;LE strengthening/ROM;Elevations;Therapeutic exercise;Endurance training;Patient/family training;Equipment eval/education;Bed mobility;Gait training;Compensatory technique education;Spoke to nursing;Spoke to case management;OT;Cognitive reorientation   PT Frequency 3-5x/wk   Discharge Recommendation   Rehab Resource Intensity Level, PT I (Maximum Resource Intensity)   AM-PAC Basic Mobility Inpatient   Turning in Flat Bed Without Bedrails 2   Lying on Back to Sitting on Edge of Flat Bed Without Bedrails 2   Moving Bed to Chair 1   Standing Up From Chair Using Arms 1   Walk in Room 1   Climb 3-5 Stairs With Railing 1   Basic Mobility Inpatient Raw Score 8   Turning Head Towards [place] 3   Follow Simple Instructions 2   Low Function Basic Mobility Raw Score  13   Low Function Basic Mobility Standardized Score  20.14   [facility] Highest Level Of Mobility   -HL Goal 3: Sit at edge of bed   -[facility] Achieved 3: Sit at edge of bed   End of Consult   Patient Position at End of Consult Bedside chair;Bed/Chair alarm activated;All needs within reach         Pt requires PT/OT co-eval due to medical complexity, safety concerns, fall risk, significant assistance with mobility and/or cognitive-behavioral impairments.    (Please find full objective findings from PT assessment regarding body systems outlined above).     Assessment: Pt is a 53 y.o. female seen for PT evaluation s/p admission to [facility] on 9/8/2024 with Fall from standing.  Order placed for PT services.  Upon evaluation: Pt is presenting with impaired functional mobility due to pain, decreased strength, decreased endurance, impaired balance, impaired cognition, decreased safety awareness, impaired judgment, " fall risk, and LE edema requiring  maximal of 1 to 2 assistance for bed mobility and unable to assess transfers due to safety concerns . Pt's clinical presentation is currently unpredictable given the functional mobility deficits above, especially weakness, edema of extremities, decreased endurance, impaired balance, pain, decreased activity tolerance, decreased functional mobility tolerance, decreased safety awareness, impaired judgement, and decreased cognition, coupled with fall risks as indicated by AM-PAC 6-Clicks: 8/24 as well as hx of falls, impaired balance, polypharmacy, impaired judgement, decreased safety awareness, limited sensation/neuropathy, decreased cognition, and obesity and combined with medical complications of pain impacting overall mobility status, abnormal H&H, abnormal WBCs, abnormal potassium values, multiple readmissions, need for input for mobility technique/safety, and hepatic encephalopathy, abnormal ammonia level .  Pt's PMHx and comorbidities that may affect physical performance and progress include: obesity, neuropathy, and recent L1 fracture, neuroendocrine tumor, CHF, HYATT cirrhosis of liver, HTN, anxiety, fibromyalgia, chronic pain, sarcoidosis . Personal factors affecting pt at time of IE include: step(s) to enter environment, limited home support, inability to perform IADLs, inability to perform ADLs, inability to navigate level surfaces without external assistance, inability to ambulate household distances, limited insight into impairments, and recent fall(s)/fall history. Pt will benefit from continued skilled PT services to address deficits as defined above and to maximize level of functional mobility to facilitate return toward PLOF and improved QOL. From PT/mobility standpoint, recommendation at time of d/c would be Level I (Maximum Resource Intensity) in order to reduce fall risk and maximize pt's functional independence and consistency with mobility. Recommend trial with  walker next 1-2 sessions and ther ex next 1-2 sessions.       The patient's AM-PAC Basic Mobility Inpatient Short Form Raw Score is 8. A Raw score of less than or equal to 16 suggests the patient may benefit from discharge to post-acute rehabilitation services. Please also refer to the recommendation of the Physical Therapist for safe discharge planning.       Goals: Pt will: Perform bed mobility tasks with modified Independent to reposition in bed and prepare for transfers. Pt will perform transfers with modified Independent to decrease burden of care, decrease risk for falls, and improve activity tolerance and prepare for ambulation. Pt will ambulate with LRAD for >/= 150' with  supervision  to decrease burden of care, decrease risk for falls, improve activity tolerance, and improve gait quality and to access home environment. Pt will complete 1 step with LRAD and >/= 4 steps with unilateral handrail with supervision to decrease burden of care, return to home with FABRICIO, decrease risk for falls, and improve activity tolerance. Pt will participate in objective balance assessment to determine baseline fall risk. Pt will participate in SSWS assessment to determine level of mobility. Pt will increase B LE strength >/= 1/2 MMT grade to facilitate functional mobility. Pt will don TLSO brace with supervision for decreased pain and safety with OOB activity.      Dianna Lwaler, PT,DPT

## 2024-09-09 NOTE — OCCUPATIONAL THERAPY NOTE
Occupational Therapy Evaluation     Patient Name: Bailey Olsen  Today's Date: 9/9/2024  Problem List  Principal Problem:    Fall from standing  Active Problems:    Class 3 severe obesity due to excess calories in adult (HCC)    Hepatic encephalopathy (HCC)    Closed compression fracture of L1 lumbar vertebra, sequela    Past Medical History  Past Medical History:   Diagnosis Date    Back pain     Carcinoid tumor     neuroendocrine    CHF (congestive heart failure) (HCC)     Cirrhosis of liver (HCC)      Past Surgical History  History reviewed. No pertinent surgical history.        09/09/24 1001   Note Type   Note type Evaluation   Pain Assessment   Pain Assessment Tool FLACC   Pain Location/Orientation Orientation: Lower;Location: Back   Pain Rating: FLACC (Rest) - Face 0   Pain Rating: FLACC (Rest) - Legs 0   Pain Rating: FLACC (Rest) - Activity 0   Pain Rating: FLACC (Rest) - Cry 0   Pain Rating: FLACC (Rest) - Consolability 0   Score: FLACC (Rest) 0   Pain Rating: FLACC (Activity) - Face 1   Pain Rating: FLACC (Activity) - Legs 0   Pain Rating: FLACC (Activity) - Activity 0   Pain Rating: FLACC (Activity) - Cry 0   Pain Rating: FLACC (Activity) - Consolability 0   Score: FLACC (Activity) 1   Restrictions/Precautions   Braces or Orthoses TLSO   Other Precautions Cognitive;Chair Alarm;Bed Alarm;Fall Risk;O2  (1lpm)   Home Living   Type of Home House  (1 FABRICIO)   Home Layout Two level;Performs ADLs on one level;Able to live on main level with bedroom/bathroom   Bathroom Shower/Tub Tub/shower unit   Bathroom Toilet Standard   Bathroom Equipment Grab bars in shower;Grab bars around toilet   Home Equipment Walker   Additional Comments Pt poor historian with cognitive deficits. Pt unable to generate home living or PLOF at this time. Per OT evaluation on 7/31/24, pt lives in a 2SH with her spouse and daughter. RW use for functional mobility PRN.   Prior Function   Level of [place] Independent with  ADLs;Independent with functional mobility   Lives With Spouse;Daughter   Receives Help From Family   IADLs Independent with medication management;Family/Friend/Other provides transportation;Family/Friend/Other provides meals   Falls in the last 6 months 1 to 4   General   Additional General Comments Pt admitted to [facility] in July with L1 compression fracture, TLSO brace ordered and administered to patient at that time. Upon assessment on this date, pt presented with LSO brace that did not fit properly d/t  body habitus. PT spoke with  who reports he will bring TLSO brace in for further mobility.   ADL   UB Dressing Assistance 1  Total Assistance   UB Dressing Deficit Setup;Verbal cueing;Increased time to complete   LB Dressing Assistance 1  Total Assistance   LB Dressing Deficit Setup;Verbal cueing;Increased time to complete   Additional Comments Total A for all ADLs this session d/t difficulty following commands.   Bed Mobility   Rolling R 2  Maximal assistance   Additional items Assist x 1;Bedrails   Rolling L 2  Maximal assistance   Additional items Assist x 1;Bedrails   Supine to Sit 2  Maximal assistance   Additional items Assist x 1;Increased time required;Verbal cues;LE management   Sit to Supine 2  Maximal assistance   Additional items Assist x 2;Increased time required;Verbal cues;LE management   Additional Comments Pt supine in bed at beginning of session. Supine to sit @ Max A. Pt with significant R-sided posterior lean while seated at EOB, requiring Max-Min A to maintain upright seated. Sit to supine @ Max A x2. Pt able to roll L/R with Max A to situate in bed.   Transfers   Sit to Stand Unable to assess   Additional Comments Unsafe to attempt standing at this time d/t increased confusion, difficulty command following and decreased seated balance   Balance   Static Sitting Poor   Dynamic Sitting Poor -   Activity Tolerance   Activity Tolerance Patient limited by fatigue;Other (Comment)  (cognitive  deficits)   Medical Staff Made Aware Spoke with PT Dianna   RUE Assessment   RUE Assessment X   LUE Assessment   LUE Assessment X   Hand Function   Gross Motor Coordination Impaired   Fine Motor Coordination Impaired   Hand Function Comments Difficult to formally assess BUE d/t difficulty command following but pt presenting with decreased LUE ROM compared to RUE and slight L-sided inattention. Will continue to reassess throughout LOS once cognition improves. Spoke with Dr. Stuart regarding concerns.   Vision - Complex Assessment   Additional Comments Pt with slight L-sided inattention, unable to attend to standardized visual assessment. Will continue to assess as cognition improves.   Cognition   Overall Cognitive Status Impaired   Arousal/Participation Alert;Responsive;Cooperative;Lethargic   Attention Difficulty attending to directions   Orientation Level Oriented to person;Oriented to place;Disoriented to time;Disoriented to situation   Memory Decreased long term memory;Decreased recall of recent events;Decreased short term memory;Decreased recall of biographical information;Decreased recall of precautions   Following Commands Follows one step commands inconsistently   Assessment   Limitation Decreased ADL status;Decreased UE strength;Decreased UE ROM;Decreased Safe judgement during ADL;Decreased cognition;Decreased endurance;Decreased high-level ADLs;Decreased self-care trans   Prognosis Good   Assessment Pt is a 53 y.o. female, admitted [facility] 9/8/2024 d/t experiencing a fall at home with increased confusion. Dx: fall from standing. Pt with PMHx impacting their performance during ADL tasks, including: back pain, HYATT cirrhosis, neuroendocrine tumor, CHF. Prior to admission to the hospital Pt was performing ADLs without physical assistance. IADLs with physical assistance. Functional transfers/ambulation without physical assistance. Cognitive status was PTA was Intact. OT order placed to assess Pt's ADLs,  cognitive status, and performance during functional tasks in order to maximize safety and independence while making most appropriate d/c recommendations. PT/OT co-evaluation completed at this time d/t significant mobility deficits and safety concerns. Pt's clinical presentation is currently unstable/unpredictable given new onset deficits that effect Pt's occupational performance and ability to safely return to PLOF including decrease activity tolerance, decrease standing balance, decrease sitting balance, decrease performance during ADL tasks, decrease cognition, decrease safety awareness , decrease BUE ROM, decrease UB MS, decrease generalized strength, decrease activity engagement, and decrease performance during functional transfers combined with medical complications of abnormal H&H, abnormal CBC, abnormal potassium values, and need for input for mobility technique/safety. Pt maintained Good O2 sats on 1lpm. Personal factors affecting Pt at time of initial evaluation include: step(s) to enter environment, multi-level environment, past experience, inability to ambulate household distances, inability to navigate community distances, limited insight into impairments, decreased initiation and engagement, and recent fall(s)/fall history. Pt will benefit from continued skilled OT services to address deficits as defined above and to maximize level independence/participation during ADLs and functional tasks to facilitate return toward PLOF and improved quality of life. From an occupational therapy standpoint, recommendation at time of d/c would be Level I: Maximum Resource Intensity Therapy   Plan   Treatment Interventions ADL retraining;Visual perceptual retraining;Functional transfer training;UE strengthening/ROM;Endurance training;Cognitive reorientation;Patient/family training;Equipment evaluation/education;Neuromuscular reeducation;Fine motor coordination activities;Compensatory technique education;UE  splinting;Continued evaluation;Energy conservation;Cardiac education;Activityengagement   Goal Expiration Date 09/23/24   OT Frequency 3-5x/wk   Discharge Recommendation   Rehab Resource Intensity Level, OT I (Maximum Resource Intensity)   AM-PAC Daily Activity Inpatient   Lower Body Dressing 1   Bathing 1   Toileting 1   Upper Body Dressing 1   Grooming 1   Eating 1   Daily Activity Raw Score 6   Turning Head Towards Sound 2   Follow Simple Instructions 2   Low Function Daily Activity Raw Score 10   Low Function Daily Activity Standardized Score  17.31   AM-[facility] Applied Cognition Inpatient   Following a Speech/Presentation 1   Understanding Ordinary Conversation 2   Taking Medications 1   Remembering Where Things Are Placed or Put Away 1   Remembering List of 4-5 Errands 1   Taking Care of Complicated Tasks 1   Applied Cognition Raw Score 7   Applied Cognition Standardized Score 15.17   End of Consult   Patient Position at End of Consult Supine;Bed/Chair alarm activated;All needs within reach     The patient's raw score on the AM-PAC Daily Activity Inpatient Short Form is 6. A raw score of less than 19 suggests the patient may benefit from discharge to post-acute rehabilitation services. Please refer to the recommendation of the Occupational Therapist for safe discharge planning.    Pt goals to be met by 9/23/2023    Pt will demonstrate ability to complete grooming/hygiene tasks @ Mod I after set-up.  Pt will demonstrate ability to complete supine<>sit @ Mod I in order to increase safety and independence during ADL tasks.  Pt will demonstrate ability to complete UB ADLs including washing/dressing @ Mod I in order to increase performance and participation during meaningful tasks  Pt will demonstrate ability to complete LB dressing @ Mod I in order to increase safety and independence during meaningful tasks.   Pt will demonstrate ability to iain/doff socks/shoes while sitting EOB @ Mod I in order to increase safety  and independence during meaningful tasks.   Pt will demonstrate ability to complete toileting tasks including CM and pericare @ Mod I in order to increase safety and independence during meaningful tasks.  Pt will demonstrate ability to complete EOB, chair, toilet/commode transfers @ Mod I in order to increase performance and participation during functional tasks.  Pt will demonstrate ability to stand for 3-5 minutes while maintaining Fair + balance with use of RW for UB support PRN.  Pt will demonstrate ability to tolerate 30-35 minute OT session with no vc'ing for deep breathing or use of energy conservation techniques in order to increase activity tolerance during functional tasks.   Pt will demonstrate Good carryover of use of energy conservation/compensatory strategies during ADLs and functional tasks in order to increase safety and reduce risk for falls.   Pt will demonstrate Good attention and participation in continued evaluation of functional ambulation house hold distances in order to assist with safe d/c planning.  Pt will attend to continued cognitive assessments 100% of the time in order to provide most appropriate d/c recommendations.   Pt will follow 100% simple 2-step commands and be A&O x4 consistently with environmental cues to increase participation in functional activities.   Pt will identify 3 areas of interest/hobbies and 1 intervention on how to incorporate into daily life in order to increase interaction with environment and peers as well as increase participation in meaningful tasks.   Pt will demonstrate 100% carryover of BUE HEP in order to increase BUE MS and increase performance during functional tasks upon d/c home.    Anne Johnson, OTR/L

## 2024-09-09 NOTE — PLAN OF CARE
Problem: PHYSICAL THERAPY ADULT  Goal: Performs mobility at highest level of function for planned discharge setting.  See evaluation for individualized goals.  Description: Treatment/Interventions: ADL retraining, Functional transfer training, LE strengthening/ROM, Elevations, Therapeutic exercise, Endurance training, Patient/family training, Equipment eval/education, Bed mobility, Gait training, Compensatory technique education, Spoke to nursing, Spoke to case management, OT, Cognitive reorientation          See flowsheet documentation for full assessment, interventions and recommendations.  Note: Prognosis: Fair  Problem List: Decreased strength, Decreased endurance, Impaired balance, Decreased mobility, Decreased cognition, Impaired judgement, Decreased safety awareness, Obesity, Pain  Assessment: Pt is a 53 y.o. female seen for PT evaluation s/p admission to [facility] on 9/8/2024 with Fall from standing.  Order placed for PT services.  Upon evaluation: Pt is presenting with impaired functional mobility due to pain, decreased strength, decreased endurance, impaired balance, impaired cognition, decreased safety awareness, impaired judgment, fall risk, and LE edema requiring  maximal of 1 to 2 assistance for bed mobility and unable to assess transfers due to safety concerns . Pt's clinical presentation is currently unpredictable given the functional mobility deficits above, especially weakness, edema of extremities, decreased endurance, impaired balance, pain, decreased activity tolerance, decreased functional mobility tolerance, decreased safety awareness, impaired judgement, and decreased cognition, coupled with fall risks as indicated by AM-PAC 6-Clicks: 8/24 as well as hx of falls, impaired balance, polypharmacy, impaired judgement, decreased safety awareness, limited sensation/neuropathy, decreased cognition, and obesity and combined with medical complications of pain impacting overall  mobility status, abnormal H&H, abnormal WBCs, abnormal potassium values, multiple readmissions, need for input for mobility technique/safety, and hepatic encephalopathy, abnormal ammonia level .  Pt's PMHx and comorbidities that may affect physical performance and progress include: obesity, neuropathy, and recent L1 fracture, neuroendocrine tumor, CHF, HYATT cirrhosis of liver, HTN, anxiety, fibromyalgia, chronic pain, sarcoidosis . Personal factors affecting pt at time of IE include: step(s) to enter environment, limited home support, inability to perform IADLs, inability to perform ADLs, inability to navigate level surfaces without external assistance, inability to ambulate household distances, limited insight into impairments, and recent fall(s)/fall history. Pt will benefit from continued skilled PT services to address deficits as defined above and to maximize level of functional mobility to facilitate return toward PLOF and improved QOL. From PT/mobility standpoint, recommendation at time of d/c would be Level I (Maximum Resource Intensity) in order to reduce fall risk and maximize pt's functional independence and consistency with mobility. Recommend trial with walker next 1-2 sessions and ther ex next 1-2 sessions.  Barriers to Discharge: Inaccessible home environment, Decreased caregiver support  Barriers to Discharge Comments: requires assistance to complete mobility, safety concerns  Rehab Resource Intensity Level, PT: I (Maximum Resource Intensity)    See flowsheet documentation for full assessment.

## 2024-09-10 ENCOUNTER — APPOINTMENT (OUTPATIENT)
Dept: ULTRASOUND IMAGING | Facility: HOSPITAL | Age: 54
End: 2024-09-10
Payer: COMMERCIAL

## 2024-09-10 LAB
AMMONIA PLAS-SCNC: 90 UMOL/L (ref 18–72)
ANION GAP SERPL CALCULATED.3IONS-SCNC: 4 MMOL/L (ref 4–13)
BUN SERPL-MCNC: 17 MG/DL (ref 5–25)
CALCIUM SERPL-MCNC: 8.4 MG/DL (ref 8.4–10.2)
CHLORIDE SERPL-SCNC: 101 MMOL/L (ref 96–108)
CO2 SERPL-SCNC: 32 MMOL/L (ref 21–32)
CREAT SERPL-MCNC: 0.9 MG/DL (ref 0.6–1.3)
ERYTHROCYTE [DISTWIDTH] IN BLOOD BY AUTOMATED COUNT: 14.7 % (ref 11.6–15.1)
GFR SERPL CREATININE-BSD FRML MDRD: 72 ML/MIN/1.73SQ M
GLUCOSE SERPL-MCNC: 120 MG/DL (ref 65–140)
HCT VFR BLD AUTO: 28.8 % (ref 34.8–46.1)
HGB BLD-MCNC: 9.3 G/DL (ref 11.5–15.4)
MCH RBC QN AUTO: 31.8 PG (ref 26.8–34.3)
MCHC RBC AUTO-ENTMCNC: 32.3 G/DL (ref 31.4–37.4)
MCV RBC AUTO: 99 FL (ref 82–98)
PLATELET # BLD AUTO: 53 THOUSANDS/UL (ref 149–390)
PMV BLD AUTO: 10 FL (ref 8.9–12.7)
POTASSIUM SERPL-SCNC: 3.4 MMOL/L (ref 3.5–5.3)
RBC # BLD AUTO: 2.92 MILLION/UL (ref 3.81–5.12)
SODIUM SERPL-SCNC: 137 MMOL/L (ref 135–147)
WBC # BLD AUTO: 2.83 THOUSAND/UL (ref 4.31–10.16)

## 2024-09-10 PROCEDURE — 97116 GAIT TRAINING THERAPY: CPT

## 2024-09-10 PROCEDURE — 99233 SBSQ HOSP IP/OBS HIGH 50: CPT | Performed by: FAMILY MEDICINE

## 2024-09-10 PROCEDURE — 97530 THERAPEUTIC ACTIVITIES: CPT

## 2024-09-10 PROCEDURE — 97535 SELF CARE MNGMENT TRAINING: CPT

## 2024-09-10 PROCEDURE — 76705 ECHO EXAM OF ABDOMEN: CPT

## 2024-09-10 PROCEDURE — 85027 COMPLETE CBC AUTOMATED: CPT | Performed by: FAMILY MEDICINE

## 2024-09-10 PROCEDURE — 82140 ASSAY OF AMMONIA: CPT | Performed by: FAMILY MEDICINE

## 2024-09-10 PROCEDURE — 80048 BASIC METABOLIC PNL TOTAL CA: CPT | Performed by: FAMILY MEDICINE

## 2024-09-10 RX ORDER — POTASSIUM CHLORIDE 1500 MG/1
40 TABLET, EXTENDED RELEASE ORAL ONCE
Status: COMPLETED | OUTPATIENT
Start: 2024-09-10 | End: 2024-09-10

## 2024-09-10 RX ORDER — OXYCODONE HYDROCHLORIDE 5 MG/1
5 TABLET ORAL EVERY 6 HOURS PRN
Status: DISCONTINUED | OUTPATIENT
Start: 2024-09-10 | End: 2024-09-11

## 2024-09-10 RX ADMIN — TORSEMIDE 20 MG: 20 TABLET ORAL at 08:57

## 2024-09-10 RX ADMIN — AMITRIPTYLINE HYDROCHLORIDE 75 MG: 25 TABLET, FILM COATED ORAL at 21:02

## 2024-09-10 RX ADMIN — SPIRONOLACTONE 50 MG: 25 TABLET ORAL at 08:57

## 2024-09-10 RX ADMIN — POTASSIUM CHLORIDE 40 MEQ: 1500 TABLET, EXTENDED RELEASE ORAL at 12:01

## 2024-09-10 RX ADMIN — LIDOCAINE 5% 1 PATCH: 700 PATCH TOPICAL at 08:57

## 2024-09-10 RX ADMIN — LACTULOSE 30 G: 20 SOLUTION ORAL at 16:54

## 2024-09-10 RX ADMIN — ENOXAPARIN SODIUM 40 MG: 40 INJECTION SUBCUTANEOUS at 08:57

## 2024-09-10 RX ADMIN — FOLIC ACID 1 MG: 1 TABLET ORAL at 08:56

## 2024-09-10 RX ADMIN — LACTULOSE 30 G: 20 SOLUTION ORAL at 08:56

## 2024-09-10 RX ADMIN — LACTULOSE 30 G: 20 SOLUTION ORAL at 12:01

## 2024-09-10 RX ADMIN — RIFAXIMIN 550 MG: 550 TABLET ORAL at 08:56

## 2024-09-10 RX ADMIN — LACTULOSE 30 G: 20 SOLUTION ORAL at 21:02

## 2024-09-10 RX ADMIN — SERTRALINE 100 MG: 100 TABLET, FILM COATED ORAL at 08:57

## 2024-09-10 RX ADMIN — OXYCODONE HYDROCHLORIDE 5 MG: 5 TABLET ORAL at 16:54

## 2024-09-10 RX ADMIN — RIFAXIMIN 550 MG: 550 TABLET ORAL at 21:03

## 2024-09-10 RX ADMIN — TORSEMIDE 20 MG: 20 TABLET ORAL at 16:54

## 2024-09-10 NOTE — ASSESSMENT & PLAN NOTE
"Patient with history of [place] cirrhosis-follows with [facility] hepatology/gastroenterology  Maintained in outpatient setting on 3 times daily to 4 times daily lactulose as well as Aldactone  Reported compliance  Elevated ammonia upon presentation with concurrent \"confusion\" per daughter  Increase lactulose dose and also Xifaxan added.  Patient states she has not had a bowel movement in a few days. Also given lactulose retention enema and had one large bm.    "

## 2024-09-10 NOTE — PLAN OF CARE
Problem: OCCUPATIONAL THERAPY ADULT  Goal: Performs self-care activities at highest level of function for planned discharge setting.  See evaluation for individualized goals.  Description: Treatment Interventions: ADL retraining, Visual perceptual retraining, Functional transfer training, UE strengthening/ROM, Endurance training, Cognitive reorientation, Patient/family training, Equipment evaluation/education, Neuromuscular reeducation, Fine motor coordination activities, Compensatory technique education, UE splinting, Continued evaluation, Energy conservation, Cardiac education, Activityengagement          See flowsheet documentation for full assessment, interventions and recommendations.   Outcome: Progressing  Note: Limitation: Decreased ADL status, Decreased UE strength, Decreased UE ROM, Decreased Safe judgement during ADL, Decreased cognition, Decreased endurance, Decreased high-level ADLs, Decreased self-care trans  Prognosis: Good  Assessment: Pt tolerated treatment fairly. She was received in bathroom without TLSO donned. Education and demonstrations provided to the pt regarding importance of wearing when OOB/how to doff/don properly. Pt verbalizing understanding. She continues to be limited by cognitive status/attention however this is much improved as compared to initial evaluation. Pt able to participate in functional transfers and functional mobility with use of RW and supervision today. Pt will benefit from continued skilled OT services to maximize independence with ADLs, functional mobility, and meaningful occupations. Changing recommendation to Level III (Minimum Resource Intensity) as the pt can return home with skilled home OT services, continued use of RW, and support of family.     Rehab Resource Intensity Level, OT: III (Minimum Resource Intensity)

## 2024-09-10 NOTE — OCCUPATIONAL THERAPY NOTE
Occupational Therapy Progress Note     Patient Name: Bailey Olsen  Today's Date: 9/10/2024  Problem List  Principal Problem:    Fall from standing  Active Problems:    Class 3 severe obesity due to excess calories in adult (HCC)    Hepatic encephalopathy (HCC)    Closed compression fracture of L1 lumbar vertebra, sequela       09/10/24 2553   Note Type   Note Type Treatment   Pain Assessment   Pain Assessment Tool FLACC   Pain Location/Orientation Location: Back   Pain Rating: FLACC (Rest) - Face 0   Pain Rating: FLACC (Rest) - Legs 0   Pain Rating: FLACC (Rest) - Activity 0   Pain Rating: FLACC (Rest) - Cry 0   Pain Rating: FLACC (Rest) - Consolability 0   Score: FLACC (Rest) 0   Pain Rating: FLACC (Activity) - Face 1   Pain Rating: FLACC (Activity) - Legs 0   Pain Rating: FLACC (Activity) - Activity 0   Pain Rating: FLACC (Activity) - Cry 0   Pain Rating: FLACC (Activity) - Consolability 0   Score: FLACC (Activity) 1   Restrictions/Precautions   Braces or Orthoses TLSO   Other Precautions Chair Alarm;Bed Alarm;Fall Risk;Pain;Cognitive   ADL   Grooming Assistance 5  Supervision/Setup   Grooming Deficit Increased time to complete;Wash/dry hands;Supervision/safety   Grooming Comments Pt washed/dried B hands while standing at sink with supervision.   UB Dressing Assistance 3  Moderate Assistance   UB Dressing Deficit Fasteners;Pull around back;Supervision/safety;Increased time to complete;Verbal cueing   UB Dressing Comments Pt requiring moderate assistance to don TLSO properly.   Toileting Assistance    (SBA)   Toileting Deficit Perineal hygiene   Toileting Comments Pt participated in pericare in standing with SBA.   Bed Mobility   Additional Comments Not assessed, pt received OOB seated on BSC upon start of session.   Transfers   Sit to Stand 5  Supervision   Additional items Increased time required;Verbal cues   Stand to Sit 5  Supervision   Additional items Increased time required;Verbal cues   Stand pivot 5   Supervision   Additional items Increased time required;Verbal cues   Additional Comments All functional transfers with RW.   Functional Mobility   Functional Mobility 5  Supervision   Additional Comments Pt participated in functional household distances x various trials with supervision and RW.   Cognition   Overall Cognitive Status Impaired   Arousal/Participation Alert;Cooperative   Attention Attends with cues to redirect   Following Commands Follows one step commands with increased time or repetition   Activity Tolerance   Activity Tolerance Patient limited by fatigue   Medical Staff Made Aware PTDianna and RNMili   Assessment   Assessment Pt tolerated treatment fairly. She was received in bathroom without TLSO donned. Education and demonstrations provided to the pt regarding importance of wearing when OOB/how to doff/don properly. Pt verbalizing understanding. She continues to be limited by cognitive status/attention however this is much improved as compared to initial evaluation. Pt able to participate in functional transfers and functional mobility with use of RW and supervision today. Pt will benefit from continued skilled OT services to maximize independence with ADLs, functional mobility, and meaningful occupations. Changing recommendation to Level III (Minimum Resource Intensity) as the pt can return home with skilled home OT services, continued use of RW, and support of family.   Plan   Treatment Interventions ADL retraining;Visual perceptual retraining;Functional transfer training;UE strengthening/ROM;Endurance training;Cognitive reorientation;Patient/family training;Equipment evaluation/education;Compensatory technique education;Continued evaluation;Energy conservation;Activityengagement   Goal Expiration Date 09/23/24   OT Treatment Day 1   OT Frequency 3-5x/wk   Discharge Recommendation   Rehab Resource Intensity Level, OT III (Minimum Resource Intensity)   Additional Comments  Use of RW for all  functional mobility, continued support of family, compliance with TLSO   AM-PAC Daily Activity Inpatient   Lower Body Dressing 3   Bathing 3   Toileting 3   Upper Body Dressing 2  (donning/doffing TLSO properly)   Grooming 3   Eating 4   Daily Activity Raw Score 18   Daily Activity Standardized Score (Calc for Raw Score >=11) 38.66   AM-PAC Applied Cognition Inpatient   Following a Speech/Presentation 2   Understanding Ordinary Conversation 3   Taking Medications 2   Remembering Where Things Are Placed or Put Away 2   Remembering List of 4-5 Errands 2   Taking Care of Complicated Tasks 1   Applied Cognition Raw Score 12   Applied Cognition Standardized Score 28.82   End of Consult   Patient Position at End of Consult Bedside chair;Bed/Chair alarm activated;All needs within reach     The patient's raw score on the AM-PAC Daily Activity Inpatient Short Form is 18. A raw score of less than 19 suggests the patient may benefit from discharge to post-acute rehabilitation services. Despite AM-PAC score, pt demonstrating ability to participate in functional transfers and mobility with supervision today and is near her baseline level of function, will most benefit from returning home with skilled home OT services and continued support of family. Please refer to the recommendation of the Occupational Therapist for safe discharge planning.    Pt goals to be met by 9/23/2024:     Pt will demonstrate ability to complete grooming/hygiene tasks @ Mod I after set-up.  Pt will demonstrate ability to complete supine<>sit @ Mod I in order to increase safety and independence during ADL tasks.  Pt will demonstrate ability to complete UB ADLs including washing/dressing @ Mod I in order to increase performance and participation during meaningful tasks  Pt will demonstrate ability to complete LB dressing @ Mod I in order to increase safety and independence during meaningful tasks.   Pt will demonstrate ability to iain/doff socks/shoes  while sitting EOB @ Mod I in order to increase safety and independence during meaningful tasks.   Pt will demonstrate ability to complete toileting tasks including CM and pericare @ Mod I in order to increase safety and independence during meaningful tasks.  Pt will demonstrate ability to complete EOB, chair, toilet/commode transfers @ Mod I in order to increase performance and participation during functional tasks.  Pt will demonstrate ability to stand for 3-5 minutes while maintaining Fair + balance with use of RW for UB support PRN.  Pt will demonstrate ability to tolerate 30-35 minute OT session with no vc'ing for deep breathing or use of energy conservation techniques in order to increase activity tolerance during functional tasks.   Pt will demonstrate Good carryover of use of energy conservation/compensatory strategies during ADLs and functional tasks in order to increase safety and reduce risk for falls.   Pt will demonstrate Good attention and participation in continued evaluation of functional ambulation house hold distances in order to assist with safe d/c planning.  Pt will attend to continued cognitive assessments 100% of the time in order to provide most appropriate d/c recommendations.   Pt will follow 100% simple 2-step commands and be A&O x4 consistently with environmental cues to increase participation in functional activities.   Pt will identify 3 areas of interest/hobbies and 1 intervention on how to incorporate into daily life in order to increase interaction with environment and peers as well as increase participation in meaningful tasks.   Pt will demonstrate 100% carryover of BUE HEP in order to increase BUE MS and increase performance during functional tasks upon d/c home.    Ryan Farah MS, OTR/L

## 2024-09-10 NOTE — CASE MANAGEMENT
Case Management Discharge Planning Note    Patient name Bailey Olsen  Location /-01 MRN [id number]  : 1970 Date 9/10/2024       Current Admission Date: 2024  Current Admission Diagnosis:Fall from standing   Patient Active Problem List    Diagnosis Date Noted Date Diagnosed    Fall from standing 2024     Closed compression fracture of L1 lumbar vertebra, sequela 2024     Closed stable burst fracture of first lumbar vertebra with routine healing 2024     Groin hematoma 2024     Ambulatory dysfunction 2024     Intractable back pain 2024     Hyperammonemia (HCC) 2024     Hepatic encephalopathy (HCC) 2024     Fall 2024     Other cirrhosis of liver (HCC) 2024     History of malignant carcinoid tumor of small intestine 2024     Class 3 severe obesity due to excess calories in adult (HCC) 2024     Primary hypertension 2024     Anxiety 2024     Hypokalemia 2024     Fibromyalgia 2024     Iron deficiency anemia 2024     Pancytopenia (HCC) 2024     Sarcoidosis 2024     Bilateral pulmonary infiltrates 2024       LOS (days): 2  Geometric Mean LOS (GMLOS) (days):   Days to GMLOS:     OBJECTIVE:  Risk of Unplanned Readmission Score: 29.14         Current admission status: Inpatient   Preferred Pharmacy:   [facility] [address] [place] [address] - [address] [facility]  [address] [facility]   Phone: [phone] Fax: [phone]    [facility][id number] [place], [place] - [address] [facility]  [address] [place] [id number]  Phone: [phone] Fax: [phone]    Primary Care Provider: Nayely Brown DO    Primary Insurance: [facility]  Secondary Insurance:     DISCHARGE DETAILS:        [place] referral for STR was made.  Await response of facilities and will review with patient.  DC plan is STR.

## 2024-09-10 NOTE — PLAN OF CARE
Problem: Potential for Falls  Goal: Patient will remain free of falls  Description: INTERVENTIONS:  - Educate patient/family on patient safety including physical limitations  - Instruct patient to call for assistance with activity   - Consult OT/PT to assist with strengthening/mobility   - Keep Call bell within reach  - Keep bed low and locked with side rails adjusted as appropriate  - Keep care items and personal belongings within reach  - Initiate and maintain comfort rounds  - Apply yellow socks and bracelet for high fall risk patients  - Consider moving patient to room near nurses station  9/10/2024 0019 by Angely Plummer RN  Outcome: Progressing  9/10/2024 0019 by Angely Plummer RN  Outcome: Progressing     Problem: PAIN - ADULT  Goal: Verbalizes/displays adequate comfort level or baseline comfort level  Description: Interventions:  - Encourage patient to monitor pain and request assistance  - Assess pain using appropriate pain scale  - Administer analgesics based on type and severity of pain and evaluate response  - Implement non-pharmacological measures as appropriate and evaluate response  - Consider cultural and social influences on pain and pain management  - Notify physician/advanced practitioner if interventions unsuccessful or patient reports new pain  9/10/2024 0019 by Angely Plummer RN  Outcome: Progressing  9/10/2024 0019 by Angely Plummer RN  Outcome: Progressing     Problem: INFECTION - ADULT  Goal: Absence or prevention of progression during hospitalization  Description: INTERVENTIONS:  - Assess and monitor for signs and symptoms of infection  - Monitor lab/diagnostic results  - Monitor all insertion sites, i.e. indwelling lines, tubes, and drains  - Monitor endotracheal if appropriate and nasal secretions for changes in amount and color  - [place] appropriate cooling/warming therapies per order  - Administer medications as ordered  - Instruct and encourage patient and family to use good hand hygiene  technique  - Identify and instruct in appropriate isolation precautions for identified infection/condition  9/10/2024 0019 by Angely Plummer RN  Outcome: Progressing  9/10/2024 0019 by Angely Plummer RN  Outcome: Progressing  Goal: Absence of fever/infection during neutropenic period  Description: INTERVENTIONS:  - Monitor WBC    9/10/2024 0019 by Angely Plummer RN  Outcome: Progressing  9/10/2024 0019 by Angely Plummer RN  Outcome: Progressing     Problem: SAFETY ADULT  Goal: Patient will remain free of falls  Description: INTERVENTIONS:  - Educate patient/family on patient safety including physical limitations  - Instruct patient to call for assistance with activity   - Consult OT/PT to assist with strengthening/mobility   - Keep Call bell within reach  - Keep bed low and locked with side rails adjusted as appropriate  - Keep care items and personal belongings within reach  - Initiate and maintain comfort rounds  - Make Fall Risk Sign visible to staff  - Apply yellow socks and bracelet for high fall risk patients  - Consider moving patient to room near nurses station  9/10/2024 0019 by Angely Plummer RN  Outcome: Progressing  9/10/2024 0019 by Angely Plummer RN  Outcome: Progressing  Goal: Maintain or return to baseline ADL function  Description: INTERVENTIONS:  -  Assess patient's ability to carry out ADLs; assess patient's baseline for ADL function and identify physical deficits which impact ability to perform ADLs (bathing, care of mouth/teeth, toileting, grooming, dressing, etc.)  - Assess/evaluate cause of self-care deficits   - Assess range of motion  - Assess patient's mobility; develop plan if impaired  - Assess patient's need for assistive devices and provide as appropriate  - Encourage maximum independence but intervene and supervise when necessary  - Involve family in performance of ADLs  - Assess for home care needs following discharge   - Consider OT consult to assist with ADL evaluation and planning for  discharge  - Provide patient education as appropriate  9/10/2024 0019 by Angely Plummer RN  Outcome: Progressing  9/10/2024 0019 by Angely Plummer RN  Outcome: Progressing  Goal: Maintains/Returns to pre admission functional level  Description: INTERVENTIONS:  - Perform AM-PAC 6 Click Basic Mobility/ Daily Activity assessment daily.  - Set and communicate daily mobility goal to care team and patient/family/caregiver.   - Record patient progress and toleration of activity level   9/10/2024 0019 by Angely Plummer RN  Outcome: Progressing  9/10/2024 0019 by Angely Plummer RN  Outcome: Progressing     Problem: DISCHARGE PLANNING  Goal: Discharge to home or other facility with appropriate resources  Description: INTERVENTIONS:  - Identify barriers to discharge w/patient and caregiver  - Arrange for needed discharge resources and transportation as appropriate  - Identify discharge learning needs (meds, wound care, etc.)  - Arrange for interpretive services to assist at discharge as needed  - Refer to Case Management Department for coordinating discharge planning if the patient needs post-hospital services based on physician/advanced practitioner order or complex needs related to functional status, cognitive ability, or social support system  9/10/2024 0019 by Angely Plummer RN  Outcome: Progressing  9/10/2024 0019 by Angely Plummer RN  Outcome: Progressing     Problem: Knowledge Deficit  Goal: Patient/family/caregiver demonstrates understanding of disease process, treatment plan, medications, and discharge instructions  Description: Complete learning assessment and assess knowledge base.  Interventions:  - Provide teaching at level of understanding  - Provide teaching via preferred learning methods  9/10/2024 0019 by Angely Plummer RN  Outcome: Progressing  9/10/2024 0019 by Angely Plummer RN  Outcome: Progressing     Problem: GASTROINTESTINAL - ADULT  Goal: Minimal or absence of nausea and/or vomiting  Description: INTERVENTIONS:  -  Administer IV fluids if ordered to ensure adequate hydration  - Maintain NPO status until nausea and vomiting are resolved  - Nasogastric tube if ordered  - Administer ordered antiemetic medications as needed  - Provide nonpharmacologic comfort measures as appropriate  - Advance diet as tolerated, if ordered  - Consider nutrition services referral to assist patient with adequate nutrition and appropriate food choices  9/10/2024 0019 by Angely Plummer RN  Outcome: Progressing  9/10/2024 0019 by Angely Plummer RN  Outcome: Progressing  Goal: Maintains or returns to baseline bowel function  Description: INTERVENTIONS:  - Assess bowel function  - Encourage oral fluids to ensure adequate hydration  - Administer IV fluids if ordered to ensure adequate hydration  - Administer ordered medications as needed  - Encourage mobilization and activity  - Consider nutritional services referral to assist patient with adequate nutrition and appropriate food choices  9/10/2024 0019 by Angely Plummer RN  Outcome: Progressing  9/10/2024 0019 by Angely Plummer RN  Outcome: Progressing  Goal: Maintains adequate nutritional intake  Description: INTERVENTIONS:  - Monitor percentage of each meal consumed  - Identify factors contributing to decreased intake, treat as appropriate  - Assist with meals as needed  - Monitor I&O, weight, and lab values if indicated  - Obtain nutrition services referral as needed  9/10/2024 0019 by Angely Plummer RN  Outcome: Progressing  9/10/2024 0019 by Angely Plummer RN  Outcome: Progressing     Problem: Prexisting or High Potential for Compromised Skin Integrity  Goal: Skin integrity is maintained or improved  Description: INTERVENTIONS:  - Identify patients at risk for skin breakdown  - Assess and monitor skin integrity  - Assess and monitor nutrition and hydration status  - Monitor labs   - Assess for incontinence   - Turn and reposition patient  - Assist with mobility/ambulation  - Relieve pressure over bony  prominences  - Avoid friction and shearing  - Provide appropriate hygiene as needed including keeping skin clean and dry  - Evaluate need for skin moisturizer/barrier cream  - Collaborate with interdisciplinary team   - Patient/family teaching  - Consider wound care consult   9/10/2024 0019 by Angely Plummer RN  Outcome: Progressing  9/10/2024 0019 by Angely Plummer RN  Outcome: Progressing

## 2024-09-10 NOTE — PLAN OF CARE
Problem: PHYSICAL THERAPY ADULT  Goal: Performs mobility at highest level of function for planned discharge setting.  See evaluation for individualized goals.  Description: Treatment/Interventions: ADL retraining, Functional transfer training, LE strengthening/ROM, Elevations, Therapeutic exercise, Endurance training, Patient/family training, Equipment eval/education, Bed mobility, Gait training, Compensatory technique education, Spoke to nursing, Spoke to case management, OT, Cognitive reorientation          See flowsheet documentation for full assessment, interventions and recommendations.  Outcome: Progressing  Note: Prognosis: Good  Problem List: Decreased strength, Decreased endurance, Impaired balance, Decreased mobility, Decreased cognition, Impaired judgement, Decreased safety awareness, Obesity, Pain  Assessment: Pt tolerated session fairly well. She c/o fatigue after session, but was able to trial transfers, ambulation and stairs this date. She was able to ambulate and meet STG for ambulation. She requires assistance for donning TLSO brace and cues for safety and increased attention to task as she is easily distracted. She requires cues for increased attention to left side. She is limited by decreased strength, balance, endurance and safety. She will continue to benefit from PT services to maximize LOF.  Barriers to Discharge: None  Barriers to Discharge Comments: pt has asisstance from family prn  Rehab Resource Intensity Level, PT: III (Minimum Resource Intensity)    See flowsheet documentation for full assessment.

## 2024-09-10 NOTE — PROGRESS NOTES
"Progress Note - Hospitalist   Name: Bailey Olsen 53 y.o. female I MRN: [id number]  Unit/Bed#: -01 I Date of Admission: 9/8/2024   Date of Service: 9/10/2024 I Hospital Day: 2    Assessment & Plan  Fall from standing  Patient presents today (9/8) after sustaining a mechanical fall at home earlier today (approximately 8 hours prior to arrival)  Per patient-tripped over her cat resulting in a fall onto her buttocks-denies head strike-notes acute on chronic low back pain  Patient without complaints of urinary or bowel incontinence/saddle anesthesia  Notes recent history of L1 compression fracture (7/2024)  Patient denies loss of consciousness, lightheadedness, dizziness, aura    In ED, patient without SIRS criteria, without oxygen requirement  Metabolic profile most significant for elevated ammonia  Patient hemodynamically stable  Underwent CT lumbar spine with results as follows:   Superior L1 endplate compression fracture is again noted with increased interval loss of height (now 25 to 30%)  Mild anterior wedging compared to the prior 7/30/2024 CT.   There is new mild retropulsion causing approximately 25% canal compromise.   There is no new fracture or subluxation.  TLSO brace  Was evaluated by neurosurgery at time of initial L1 compression fracture-not surgical candidate  Patient without neurologic compromise-saddle anesthesia/urinary or bowel incontinence-will place consultation to neurosurgery as will require follow-up in the outpatient setting  Fall precautions    Hepatic encephalopathy (HCC)  Patient with history of Larsen cirrhosis-follows with [facility] hepatology/gastroenterology  Maintained in outpatient setting on 3 times daily to 4 times daily lactulose as well as Aldactone  Reported compliance  Elevated ammonia upon presentation with concurrent \"confusion\" per daughter  Increase lactulose dose and also Xifaxan added.  Patient states she has not had a bowel movement in a few days. Also given lactulose " retention enema and had one large bm.    Class 3 severe obesity due to excess calories in adult (HCC)  Recommend incorporating a more whole foods plant-predominant diet along with decreasing consumption of red meats and processed foods  Per AHA guidelines, recommend moderate-vigorous intensity exercise for 30 minutes a day for 5 days a week or a total of 150 min/week  Closed compression fracture of L1 lumbar vertebra, sequela  Plan as above    VTE Pharmacologic Prophylaxis:   Moderate Risk (Score 3-4) - Pharmacological DVT Prophylaxis Ordered: enoxaparin (Lovenox).    Mobility:   Basic Mobility Inpatient Raw Score: 17  JH-HLM Goal: 5: Stand one or more mins  JH-HLM Achieved: 7: Walk 25 feet or more  JH-HLM Goal achieved. Continue to encourage appropriate mobility.    Patient Centered Rounds: I performed bedside rounds with nursing staff today.   Discussions with Specialists or Other Care Team Provider: jean gi    Education and Discussions with Family / Patient: will update spouse    Current Length of Stay: 2 day(s)  Current Patient Status: Inpatient   Certification Statement: The patient will continue to require additional inpatient hospital stay due to hepatic encephalopathy  Discharge Plan: Anticipate discharge in 48-72 hrs to discharge location to be determined pending rehab evaluations.    Code Status: Level 1 - Full Code    Subjective   Patient denies any chest pain or shortness of breath continues to be pleasantly confused had only 1 large bowel movement yesterday after retention enema    Found 3 Percocet pills in the room which were brought in from home by spouse and left with the patient.  They are 10/325 mg strength.  Will need to discuss with patient and spouse in regards to avoiding bringing in any narcotic pain medications from home and using while here.  Patient did previously have a rapid response due to increased somnolence unclear whether that was secondary to too much narcotic medications at the  time    Objective     Vitals:   Temp (24hrs), Av.1 °F (36.2 °C), Min:94.8 °F (34.9 °C), Max:98.1 °F (36.7 °C)    Temp:  [94.8 °F (34.9 °C)-98.1 °F (36.7 °C)] 94.8 °F (34.9 °C)  HR:  [83-94] 94  Resp:  [18] 18  BP: ()/(42-62) 119/61  SpO2:  [94 %-98 %] 98 %  Body mass index is 46.08 kg/m².     Input and Output Summary (last 24 hours):     Intake/Output Summary (Last 24 hours) at 9/10/2024 1049  Last data filed at 9/10/2024 0819  Gross per 24 hour   Intake 240 ml   Output 850 ml   Net -610 ml       Physical Exam  Vitals and nursing note reviewed.   Constitutional:       Appearance: She is well-developed.   HENT:      Head: Normocephalic and atraumatic.      Right Ear: External ear normal.      Left Ear: External ear normal.   Eyes:      Conjunctiva/sclera: Conjunctivae normal.      Pupils: Pupils are equal, round, and reactive to light.   Cardiovascular:      Rate and Rhythm: Normal rate and regular rhythm.      Heart sounds: Normal heart sounds.   Pulmonary:      Effort: Pulmonary effort is normal.      Breath sounds: Normal breath sounds.   Abdominal:      General: Bowel sounds are normal. There is distension.      Palpations: Abdomen is soft. There is no mass.      Tenderness: There is no abdominal tenderness. There is no guarding or rebound.   Genitourinary:     Comments: deferred  Musculoskeletal:         General: Normal range of motion.      Cervical back: Normal range of motion and neck supple.   Skin:     General: Skin is warm and dry.      Findings: No rash.   Neurological:      Mental Status: She is alert.      Deep Tendon Reflexes: Reflexes are normal and symmetric.      Comments: Cranial nerves 2-12 are normal. Oriented to person and place.            Lines/Drains:  Lines/Drains/Airways       Active Status       Name Placement date Placement time Site Days    External Urinary Catheter 24  0000  -- 1                            Lab Results: I have reviewed the following results:   Results  from last 7 days   Lab Units 09/10/24  0443 09/09/24  0550 09/08/24  1812   WBC Thousand/uL 2.83*   < > 2.84*   HEMOGLOBIN g/dL 9.3*   < > 10.3*   HEMATOCRIT % 28.8*   < > 32.5*   PLATELETS Thousands/uL 53*   < > 66*   BANDS PCT %  --   --  2   LYMPHO PCT %  --   --  26   MONO PCT %  --   --  5   EOS PCT %  --   --  3    < > = values in this interval not displayed.     Results from last 7 days   Lab Units 09/10/24  0443 09/09/24  0550   SODIUM mmol/L 137 139   POTASSIUM mmol/L 3.4* 3.3*   CHLORIDE mmol/L 101 100   CO2 mmol/L 32 32   BUN mg/dL 17 16   CREATININE mg/dL 0.90 1.22   ANION GAP mmol/L 4 7   CALCIUM mg/dL 8.4 8.3*   ALBUMIN g/dL  --  2.9*   TOTAL BILIRUBIN mg/dL  --  1.33*   ALK PHOS U/L  --  174*   ALT U/L  --  16   AST U/L  --  42*   GLUCOSE RANDOM mg/dL 120 89     Results from last 7 days   Lab Units 09/08/24  1812   INR  1.25*     Results from last 7 days   Lab Units 09/08/24  2247   POC GLUCOSE mg/dl 113               Recent Cultures (last 7 days):         Imaging Review: Reviewed radiology reports from this admission including: chest xray.  Other Studies: EKG was reviewed.     Last 24 Hours Medication List:     Current Facility-Administered Medications:     amitriptyline (ELAVIL) tablet 75 mg, HS    Cholecalciferol (VITAMIN D3) tablet 5,000 Units, Weekly    enoxaparin (LOVENOX) subcutaneous injection 40 mg, Daily    folic acid (FOLVITE) tablet 1 mg, Daily    lactulose (CHRONULAC) oral solution 30 g, 4x Daily    lidocaine (LIDODERM) 5 % patch 1 patch, Daily    rifaximin (XIFAXAN) tablet 550 mg, Q12H MIGUEL    sertraline (ZOLOFT) tablet 100 mg, Daily    spironolactone (ALDACTONE) tablet 50 mg, Daily    torsemide (DEMADEX) tablet 20 mg, BID AC    Administrative Statements   Today, Patient Was Seen By: Idalia Stuart MD  I have spent a total time of 45 minutes in caring for this patient on the day of the visit/encounter including Diagnostic results, Risks and benefits of tx options, Patient and family  education, and Counseling / Coordination of care.    **Please Note: This note may have been constructed using a voice recognition system.**

## 2024-09-10 NOTE — UTILIZATION REVIEW
Continued Stay Review    Date:   9/10/24                          Current Patient Class: Inpatient  Current Level of Care:   med surg    HPI:53 y.o. female initially admitted on   9/8  after a  fall  L1  compression fracture.     Assessment/Plan:   9/10     Not a  surgical candidate per neuro surgery.  Continue   TLSO brace and fall precautions.  Remains pleasantly confused.   Found 3 Percocet pills in the room which were brought in from home by spouse and left with the patient.  They are 10/325 mg strength.  Will need to discuss with patient and spouse in regards to avoiding bringing in any narcotic pain medications from home and using while here.  Patient did previously have a rapid response due to increased somnolence unclear whether that was secondary to too much narcotic medications at the time   Oriented to person and place.   Needs  PT/OT.       Vital Signs (last 3 days)       Date/Time Temp Pulse Resp BP MAP (mmHg) SpO2 Calculated FIO2 (%) - Nasal Cannula Nasal Cannula O2 Flow Rate (L/min) O2 Device Patient Position - Orthostatic VS Iris Coma Scale Score Pain    09/10/24 11:10:57 97 °F (36.1 °C) 93 16 115/58 77 97 % -- -- -- -- -- --    09/10/24 0900 -- -- -- -- -- -- -- -- None (Room air) -- 15 8    09/10/24 08:57:23 -- 94 -- 119/61 80 98 % -- -- -- -- -- --    09/10/24 06:05:50 -- 83 -- 102/57 72 95 % -- -- -- -- -- --    09/10/24 0300 94.8 °F (34.9 °C) -- -- 99/54 69 -- -- -- -- -- -- --    09/09/24 22:36:58 97.7 °F (36.5 °C) 89 -- 97/52 67 94 % -- -- -- -- -- --    09/09/24 2036 -- -- -- -- -- 94 % 28 2 L/min Nasal cannula -- 14 --    09/09/24 18:09:57 -- 87 -- 106/62 77 96 % -- -- -- -- -- --    09/09/24 15:32:41 98.1 °F (36.7 °C) 84 -- 86/42 57 95 % -- -- -- -- -- --    09/09/24 13:56:25 97.7 °F (36.5 °C) 85 18 96/55 69 95 % -- -- -- -- -- --    09/09/24 10:19:51 -- 77 -- 96/57 70 95 % -- -- -- -- -- --    09/09/24 10:12:36 -- 83 -- 101/64 76 93 % -- -- -- -- -- --    09/09/24 0900 -- -- -- -- -- --  28 2 L/min Nasal cannula -- -- --    09/09/24 08:56:14 -- 91 -- 110/64 79 99 % -- -- -- -- -- --    09/09/24 07:02:01 97.5 °F (36.4 °C) -- 18 98/53 68 -- -- -- -- -- -- --    09/09/24 06:08:24 -- 81 -- 99/53 68 95 % -- -- -- -- -- --    09/09/24 02:51:30 -- 84 -- 107/67 80 94 % -- -- -- -- -- --    09/09/24 01:00:03 -- 82 -- 96/55 69 94 % -- -- -- -- -- --    09/08/24 23:59:25 -- 84 -- 101/57 72 93 % -- -- -- -- -- --    09/08/24 23:34:32 -- 83 -- 103/57 72 93 % -- -- -- -- -- --    09/08/24 2310 -- 84 -- -- -- 91 % -- -- -- -- -- --    09/08/24 2300 -- 84 -- 110/59 76 91 % -- -- -- -- -- --    09/08/24 2245 -- -- -- -- -- -- -- -- -- -- 12 --    09/08/24 22:37:26 -- 86 -- 108/58 75 92 % -- -- -- -- -- --    09/08/24 2237 98.7 °F (37.1 °C) 86 20 108/58 75 91 % 28 2 L/min Nasal cannula Sitting -- --    09/08/24 2133 -- 78 -- -- -- -- -- -- -- -- -- --    09/08/24 2118 -- -- -- -- -- 92 % -- -- None (Room air) -- 15 --    09/08/24 20:36:54 97.7 °F (36.5 °C) 94 17 138/62 87 93 % -- -- -- -- -- --    09/08/24 2032 -- -- -- -- -- -- -- -- -- -- -- 8    09/08/24 1938 -- -- -- -- -- -- -- -- -- -- -- 7 09/08/24 1900 -- 93 -- 156/84 113 98 % -- -- -- -- -- 7 09/08/24 1819 -- -- -- -- -- -- -- -- -- -- 15 --    09/08/24 1800 98.4 °F (36.9 °C) 100 20 169/82 -- 94 % -- -- None (Room air) -- -- 10 - Worst Possible Pain          Weight (last 2 days)       Date/Time Weight    09/08/24 2032 126 (276.9)    09/08/24 1800 125 (275.35)              Pertinent Labs/Diagnostic Results:   Radiology:  XR chest portable   Final Interpretation by Nicole Figueroa MD (09/09 0833)      No acute cardiopulmonary disease.            Workstation performed: [id number]         CT spine thoracic and lumbar wo contrast   Final Interpretation by Don Thompson MD (09/08 1917)      S-shaped thoracolumbar scoliosis is again noted.      Superior L1 endplate compression fracture is again noted with increased interval loss of height (now  25 to 30%) and mild anterior wedging compared to the prior 7/30/2024 CT. There is new mild retropulsion causing approximately 25% canal compromise. There    is no new fracture or subluxation.               Workstation performed: [id number]         CT head without contrast   Final Interpretation by Don Thompson MD (09/08 1909)      No acute intracranial abnormality.                  Workstation performed: [id number]         XR pelvis ap only 1 or 2 vw   ED Interpretation by Bladimir Mcmanus MD (09/08 1841)   No fracture      Final Interpretation by Nicole Figueroa MD (09/09 0832)      No acute osseous abnormality.         Computerized Assisted Algorithm (CAA) may have been used to analyze all applicable images.         Workstation performed: [id number]         XR sacrum and coccyx   ED Interpretation by Bladimir Mcmanus MD (09/08 1842)   No fracture      Final Interpretation by Nicole Figueroa MD (09/09 0832)      No acute osseous abnormality.         Computerized Assisted Algorithm (CAA) may have been used to analyze all applicable images.         Workstation performed: [id number]         US abdomen limited    (Results Pending)     Cardiology:  ECG 12 lead   Final Result by Keanu Mcneil MD (09/09 3592)   Normal sinus rhythm   Minimal voltage criteria for LVH, may be normal variant ( R in aVL )   Prolonged QT   Abnormal ECG   When compared with ECG of 08-SEP-2024 18:49, (unconfirmed)   QRS axis Shifted right   Nonspecific T wave abnormality no longer evident in Anterior leads   Confirmed by Keanu Mcneil ([id number]) on 9/9/2024 7:45:37 AM      ECG 12 lead   Final Result by Keanu Mcneil MD (09/09 0757)   Normal sinus rhythm   Left axis deviation   Nonspecific T wave abnormality   Abnormal ECG   When compared with ECG of 02-JUL-2024 10:28,   QRS axis Shifted left   Nonspecific T wave abnormality, worse in Inferior leads   Nonspecific T wave abnormality now evident in Anterior leads   Confirmed by  Keanu Mcneil ([id number]) on 9/9/2024 7:59:13 AM          Results from last 7 days   Lab Units 09/08/24  2304   SARS-COV-2  Negative     Results from last 7 days   Lab Units 09/10/24  0443 09/09/24  0550 09/08/24  1812   WBC Thousand/uL 2.83* 2.33* 2.84*   HEMOGLOBIN g/dL 9.3* 9.3* 10.3*   HEMATOCRIT % 28.8* 29.5* 32.5*   PLATELETS Thousands/uL 53* 55* 66*   BANDS PCT %  --   --  2         Results from last 7 days   Lab Units 09/10/24  0443 09/09/24  0550 09/08/24  1812   SODIUM mmol/L 137 139 138   POTASSIUM mmol/L 3.4* 3.3* 3.3*   CHLORIDE mmol/L 101 100 99   CO2 mmol/L 32 32 32   ANION GAP mmol/L 4 7 7   BUN mg/dL 17 16 12   CREATININE mg/dL 0.90 1.22 1.06   EGFR ml/min/1.73sq m 72 50 59   CALCIUM mg/dL 8.4 8.3* 8.6   MAGNESIUM mg/dL  --  2.0 2.0     Results from last 7 days   Lab Units 09/10/24  0443 09/09/24  0550 09/08/24  1812   AST U/L  --  42* 47*   ALT U/L  --  16 17   ALK PHOS U/L  --  174* 198*   TOTAL PROTEIN g/dL  --  6.4 7.3   ALBUMIN g/dL  --  2.9* 3.3*   TOTAL BILIRUBIN mg/dL  --  1.33* 1.09*   AMMONIA umol/L 90* 129* 166*     Results from last 7 days   Lab Units 09/08/24  2247   POC GLUCOSE mg/dl 113     Results from last 7 days   Lab Units 09/10/24  0443 09/09/24  0550 09/08/24  1812   GLUCOSE RANDOM mg/dL 120 89 76      Results from last 7 days   Lab Units 09/08/24  2310   PH ART  7.434   PCO2 ART mm Hg 54.5*   PO2 ART mm Hg 71.2*   HCO3 ART mmol/L 35.7*   BASE EXC ART mmol/L 9.9   O2 CONTENT ART mL/dL 13.9*   O2 HGB, ARTERIAL % 92.6*   ABG SOURCE  Radial, Right                 Results from last 7 days   Lab Units 09/08/24  1812   HS TNI 0HR ng/L 5         Results from last 7 days   Lab Units 09/08/24  1812   PROTIME seconds 16.0*   INR  1.25*   PTT seconds 33               Results from last 7 days   Lab Units 09/08/24  1812   LIPASE u/L 7*                 Results from last 7 days   Lab Units 09/09/24  2055   CLARITY UA  Clear   COLOR UA  Yellow   SPEC GRAV UA  1.020   PH UA  6.0   GLUCOSE UA mg/dl  Negative   KETONES UA mg/dl Trace*   BLOOD UA  Small*   PROTEIN UA mg/dl Negative   NITRITE UA  Negative   BILIRUBIN UA  Small*   UROBILINOGEN UA E.U./dl 1.0   LEUKOCYTES UA  Trace*   WBC UA /hpf 4-10*   RBC UA /hpf 2-4   BACTERIA UA /hpf Moderate*   EPITHELIAL CELLS WET PREP /hpf Occasional     Results from last 7 days   Lab Units 09/08/24  2304   INFLUENZA A PCR  Negative   INFLUENZA B PCR  Negative   RSV PCR  Negative             Medications:   Scheduled Medications:  amitriptyline, 75 mg, Oral, HS  Cholecalciferol, 5,000 Units, Oral, Weekly  enoxaparin, 40 mg, Subcutaneous, Daily  folic acid, 1 mg, Oral, Daily  lactulose, 30 g, Oral, 4x Daily  lidocaine, 1 patch, Topical, Daily  potassium chloride, 40 mEq, Oral, Once  rifaximin, 550 mg, Oral, Q12H MIGUEL  sertraline, 100 mg, Oral, Daily  spironolactone, 50 mg, Oral, Daily  torsemide, 20 mg, Oral, BID AC      Continuous IV Infusions:     PRN Meds:       Discharge Plan:   TBD    Network Utilization Review Department  ATTENTION: Please call with any questions or concerns to [phone] and carefully listen to the prompts so that you are directed to the right person. All voicemails are confidential.   For Discharge needs, contact Care Management DC Support Team at [phone] opt. 2  Send all requests for admission clinical reviews, approved or denied determinations and any other requests to dedicated fax number below belonging to the campus where the patient is receiving treatment. List of dedicated fax numbers for the Facilities:  FACILITY NAME UR FAX NUMBER   ADMISSION DENIALS (Administrative/Medical Necessity) [phone]   DISCHARGE SUPPORT TEAM (NETWORK) [phone]   PARENT CHILD HEALTH (Maternity/NICU/Pediatrics) [phone]   [facility] [phone]   [facility] [phone]   [facility] [phone]   [facility] [phone]   [facility] [phone]   [facility] [phone]   [facility] [phone]   [facility] [phone]   [facility] [phone]   Pending sale to [facility] [phone]   [facility] [phone]   [facility] [phone]

## 2024-09-10 NOTE — ASSESSMENT & PLAN NOTE
Patient presents today (9/8) after sustaining a mechanical fall at home earlier today (approximately 8 hours prior to arrival)  Per patient-tripped over her cat resulting in a fall onto her buttocks-denies head strike-notes acute on chronic low back pain  Patient without complaints of urinary or bowel incontinence/saddle anesthesia  Notes recent history of L1 compression fracture (7/2024)  Patient denies loss of consciousness, lightheadedness, dizziness, aura    In ED, patient without SIRS criteria, without oxygen requirement  Metabolic profile most significant for elevated ammonia  Patient hemodynamically stable  Underwent CT lumbar spine with results as follows:   Superior L1 endplate compression fracture is again noted with increased interval loss of height (now 25 to 30%)  Mild anterior wedging compared to the prior 7/30/2024 CT.   There is new mild retropulsion causing approximately 25% canal compromise.   There is no new fracture or subluxation.  TLSO brace  Was evaluated by neurosurgery at time of initial L1 compression fracture-not surgical candidate  Patient without neurologic compromise-saddle anesthesia/urinary or bowel incontinence-will place consultation to neurosurgery as will require follow-up in the outpatient setting  Fall precautions

## 2024-09-10 NOTE — CONSULTS
Consultation - [facility]  Bailey Olsen 53 y.o. female MRN: [id number]  Unit/Bed#: -01 Encounter: [id number]      ASSESSMENT & PLAN    Decompensated HYATT cirrhosis  MELD-Na 11 (<2% estimated 90-day mortality), Child Wilder class B  Ascites  No prior paracentesis, CT 7/30/24 without evidence of ascites, no ascites appreciated on exam  Continue aldactone 50mg daily and torsemide 20mg BID  Low Na diet, 2g/day  Hepatic encephalopathy  Ammonia 166 on admission   Per patient she has been taking lactulose TID at home but only having 1 BM a day, sometimes none   Unclear if truly complaint with medication at home   She had a BM this AM after a lactulose enema  Mental status seems to be improving today  Encouraged importance of compliance with medication at home   Continue lactulose 30g QID with goal of 3-4 Bms/day  Continue xifaxan 550mg BID   Esophageal varices  EGD 3/2024 with grade 1 EV - recall 1 year  Due 3/2025  HCC screening  UTD  Abd US 5/2024 with no liver masses  Repeat q6 months  Due 10/2024  GI will sign off at this time, we will arrange outpatient GI follow up       Reason for Consult / Principal Problem: hepatic encephalopathy    HPI: Bailey Olsen is a 53 y.o. 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     Per chart review, patient's daughter felt her mother was confused and wanted her to be evaluated. Patient reports she tripped over her cat. In the ED, labs with elevated ammonia levels and imaging with evidence of known compression fracture of L2 with increased interval loss of height. She was admitted with fall from standing and GI has been consulted for hepatic encephalopathy.      Patient seen this afternoon while eating her lunch. She reports she came to the ED after  a fall. She is feeling well at this time. She is alert and oriented x3 but has some difficulty findings words. She states she follows with a hepatologist in [place] from [facility] but they retired and she has not been seen in 3-4 months. She needs a new hepatologist. Unfortunately I cannot view any records from them. She denies any prior paracentesis. She recently underwent EGD and colonoscopy earlier this year. She states at home she takes lactulose three times a day but typically has 1 BM a day and sometimes 1 BM every other day.     Last EGD 3/19/24 - grade 1 esophageal varices, repeat EGD 1 year.   Last Colonoscopy 3/19/24 - 1 polyp + internal hemorrhoids, repeat 2034.      Past Medical History:   Diagnosis Date    Back pain     Carcinoid tumor     neuroendocrine    CHF (congestive heart failure) (HCC)     Cirrhosis of liver (HCC)      History reviewed. No pertinent surgical history.  Social History   Social History     Substance and Sexual Activity   Alcohol Use Never     Social History     Substance and Sexual Activity   Drug Use Never     Social History     Tobacco Use   Smoking Status Never   Smokeless Tobacco Never       Family History   Problem Relation Age of Onset    Hypertension Father          Medications Prior to Admission:     amitriptyline (ELAVIL) 75 mg tablet    Cholecalciferol (Vitamin D-3) 125 MCG (5000 UT) TABS    folic acid (FOLVITE) 1 mg tablet    gabapentin (NEURONTIN) 100 mg capsule    hydroxychloroquine (PLAQUENIL) 200 mg tablet    lactulose (CHRONULAC) 10 g/15 mL solution    lidocaine (LIDODERM) 5 %    oxyCODONE (ROXICODONE) 10 MG TABS    polyethylene glycol (MIRALAX) 17 g packet    sertraline (ZOLOFT) 100 mg tablet    spironolactone (ALDACTONE) 50 mg tablet    tiZANidine (ZANAFLEX) 4 mg tablet    torsemide (DEMADEX) 20 mg tablet    Current Facility-Administered Medications:     amitriptyline (ELAVIL) tablet 75 mg, HS    Cholecalciferol (VITAMIN D3) tablet 5,000 Units,  Weekly    enoxaparin (LOVENOX) subcutaneous injection 40 mg, Daily    folic acid (FOLVITE) tablet 1 mg, Daily    lactulose (CHRONULAC) oral solution 30 g, 4x Daily    lidocaine (LIDODERM) 5 % patch 1 patch, Daily    potassium chloride (Klor-Con M20) CR tablet 40 mEq, Once    rifaximin (XIFAXAN) tablet 550 mg, Q12H MIGUEL    sertraline (ZOLOFT) tablet 100 mg, Daily    spironolactone (ALDACTONE) tablet 50 mg, Daily    torsemide (DEMADEX) tablet 20 mg, BID AC  Allergies   Allergen Reactions    Zolpidem GI Intolerance, Other (See Comments) and Hives     Other Reaction(s): Other      Blacks out    Blacks out    Tapentadol Sneezing and Other (See Comments)     Zoned out    Alprazolam Other (See Comments)     Other Reaction(s): Other      Feels zoned out    Feels zoned out    Amoxicillin-Pot Clavulanate Hives and Nausea Only     Amoxil fine    Morphine Nausea Only and Headache    Tramadol GI Intolerance    Azelastine Rash    Azelastine Hcl Rash    Molds & Smuts Rash     Other Reaction(s): Unknown       Physical Exam  Constitutional:       General: She is not in acute distress.     Appearance: She is not ill-appearing.   HENT:      Head: Normocephalic and atraumatic.      Mouth/Throat:      Mouth: Mucous membranes are moist.   Eyes:      General: No scleral icterus.  Pulmonary:      Effort: Pulmonary effort is normal. No respiratory distress.   Abdominal:      General: Abdomen is flat. There is no distension.      Palpations: Abdomen is soft.      Tenderness: There is no abdominal tenderness. There is no guarding.   Skin:     General: Skin is warm and dry.      Coloration: Skin is not jaundiced.   Neurological:      Mental Status: She is alert and oriented to person, place, and time.       Most Recent Vital Signs:  Vitals:    09/10/24 0300 09/10/24 0605 09/10/24 0857 09/10/24 1110   BP: 99/54 102/57 119/61 115/58   Pulse:  83 94 93   Resp:    16   Temp: (!) 94.8 °F (34.9 °C)   (!) 97 °F (36.1 °C)   TempSrc:       SpO2:  95%  98% 97%   Weight:       Height:           Intake/Output Summary (Last 24 hours) at 9/10/2024 1148  Last data filed at 9/10/2024 0819  Gross per 24 hour   Intake 240 ml   Output 850 ml   Net -610 ml       LABS/IMAGING  Lab Results: I have reviewed all relevant lab results during this hospitalization.    Imaging Studies:  I have reviewed all the relevant images during this hospitalizations    Counseling / Coordination of Care  Total time spent today 45 minutes. Greater than 50% of total time was spent with the patient and / or family counseling and / or coordination of care.    Angely Tapia PA-C

## 2024-09-10 NOTE — PHYSICAL THERAPY NOTE
"   PHYSICAL THERAPY TREATMENT NOTE  NAME:  Bailey Olsen  DATE: 09/10/24    Length Of Stay: 2  Performed at least 2 patient identifiers during session: Name and Birthday    TREATMENT FLOW SHEET:    09/10/24 6268   PT Last Visit   PT Visit Date 09/10/24   Note Type   Note Type Treatment   Pain Assessment   Pain Assessment Tool FLACC   Pain Location/Orientation Orientation: Lower;Location: Back   Pain Rating: FLACC (Rest) - Face 0   Pain Rating: FLACC (Rest) - Legs 0   Pain Rating: FLACC (Rest) - Activity 0   Pain Rating: FLACC (Rest) - Cry 0   Pain Rating: FLACC (Rest) - Consolability 0   Score: FLACC (Rest) 0   Pain Rating: FLACC (Activity) - Face 1   Pain Rating: FLACC (Activity) - Legs 0   Pain Rating: FLACC (Activity) - Activity 0   Pain Rating: FLACC (Activity) - Cry 1   Pain Rating: FLACC (Activity) - Consolability 0   Score: FLACC (Activity) 2   Restrictions/Precautions   Braces or Orthoses TLSO  (456 TLSO brace)   Other Precautions Chair Alarm;Bed Alarm;Fall Risk;Pain;Cognitive   General   Chart Reviewed Yes   Response to Previous Treatment Patient unable to report, no changes reported from family or staff   Family/Caregiver Present   (sister at end of session)   Cognition   Orientation Level Oriented to person;Oriented to place   Following Commands Follows one step commands with increased time or repetition   Comments cues for redirection, attention to task, pt easily distracted   Subjective   Subjective \"I was just going to the bathroom.\" (pt reporting not wearing her TLSO brace when she fell at home as she was going into the bathroom)   Bed Mobility   Additional Comments Pt OOB upon arrival to room, TLSO brace not on. Pt donned TLSO brace with moderate assistance with difficulty placing brace and sequencing placing arm strap over head intiially. cues for placement and technique.   Transfers   Sit to Stand 5  Supervision   Additional items Increased time required;Verbal cues   Stand to Sit 5  Supervision " "  Additional items Increased time required;Verbal cues   Stand pivot 5  Supervision   Additional items Increased time required;Verbal cues   Additional Comments use of RW. min cues for hand placement for safety with RW. cues for turnign completely with RW throughout as pt attempted to push aside.   Ambulation/Elevation   Gait pattern Wide DENICE;Improper Weight shift;Decreased foot clearance;Short stride;Excessively slow  (inc path deviation, decreased attention to left side requiring cues for navigation and avoidance of objects on left with RW.)   Gait Assistance 5  Supervision   Additional items Verbal cues   Assistive Device Rolling walker   Distance ambulated 140'x1 and 150'x1 with RW with supervision with slow cary, pt easily distracted, cues for inc step length and continued use of R througout as she often aattempted to abandon RW near chair.   Stair Management Assistance   (steadying asisstance)   Additional items Assist x 1;Increased time required   Stair Management Technique Two rails;Step to pattern   Number of Stairs 3   Ambulation/Elevation Additional Comments inc time to complete reciprocal pattern up and step to pattern down. pt with episode of uncontrolled twitching at top of steps. reports \"this happens to me\"   Balance   Static Sitting Fair +   Dynamic Sitting Fair   Static Standing Fair   Dynamic Standing Fair -   Ambulatory Fair   Activity Tolerance   Activity Tolerance Patient limited by fatigue   Medical Staff Made Aware OT, Pat   Exercises   Balance training  reviewed seated TE ankle DF/PF. hip flexion and LAQ.   Assessment   Prognosis Good   Problem List Decreased strength;Decreased endurance;Impaired balance;Decreased mobility;Decreased cognition;Impaired judgement;Decreased safety awareness;Obesity;Pain   Barriers to Discharge None   Barriers to Discharge Comments pt has asisstance from family prn   Goals   Patient Goals \"Go home\"   Short Term Goal #1 Pt will: Perform bed mobility tasks " with modified Independent to reposition in bed and prepare for transfers. Pt will perform transfers with modified Independent to decrease burden of care, decrease risk for falls, and improve activity tolerance and prepare for ambulation. Pt will ambulate with LRAD for >/= 150' with  modified independent to decrease burden of care, decrease risk for falls, improve activity tolerance, and improve gait quality and to access home environment. Pt will complete 1 step with LRAD and >/= 4 steps with unilateral handrail with supervision to decrease burden of care, return to home with FABRICIO, decrease risk for falls, and improve activity tolerance. Pt will participate in objective balance assessment to determine baseline fall risk. Pt will participate in SSWS assessment to determine level of mobility. Pt will increase B LE strength >/= 1/2 MMT grade to facilitate functional mobility. Pt will don TLSO brace with supervision for decreased pain and safety with OOB activity.  (updated STG for ambulation this date as patient met previous STG.)   PT Treatment Day 1   Plan   Treatment/Interventions ADL retraining;Functional transfer training;LE strengthening/ROM;Elevations;Therapeutic exercise;Endurance training;Patient/family training;Equipment eval/education;Bed mobility;Gait training;Compensatory technique education;Spoke to nursing;Spoke to case management;OT;Cognitive reorientation   Progress Progressing toward goals   PT Frequency 2-3x/wk   Discharge Recommendation   Rehab Resource Intensity Level, PT III (Minimum Resource Intensity)   Equipment Recommended   (walker-pt has)   Additional Comments extensive education provided for patient to wear TLSO brace whehn OOB to prevent further pain and injury to low back. pt verbalized understanding.   AM-PAC Basic Mobility Inpatient   Turning in Flat Bed Without Bedrails 3   Lying on Back to Sitting on Edge of Flat Bed Without Bedrails 3   Moving Bed to Chair 3   Standing Up From Chair  Using Arms 3   Walk in Room 3   Climb 3-5 Stairs With Railing 3   Basic Mobility Inpatient Raw Score 18   Basic Mobility Standardized Score 41.05   [facility] Highest Level Of Mobility   -HLM Goal 6: Walk 10 steps or more   -HLM Achieved 8: Walk 250 feet ot more   Education   Education Provided Mobility training;Home exercise program;Assistive device   Patient Demonstrates acceptance/verbal understanding   End of Consult   Patient Position at End of Consult Bedside chair;Bed/Chair alarm activated;All needs within reach       The patient's AM-PAC Basic Mobility Inpatient Short Form Raw Score is 18. A Raw score of greater than 16 suggests the patient may benefit from discharge to home. Please also refer to the recommendation of the Physical Therapist for safe discharge planning.     Pt tolerated session fairly well. She c/o fatigue after session, but was able to trial transfers, ambulation and stairs this date. She was able to ambulate and meet STG for ambulation. She requires assistance for donning TLSO brace and cues for safety and increased attention to task as she is easily distracted. She requires cues for increased attention to left side. She is limited by decreased strength, balance, endurance and safety. She will continue to benefit from PT services to maximize LOF.     Dianna Lawler, PT,DPT

## 2024-09-11 VITALS
SYSTOLIC BLOOD PRESSURE: 116 MMHG | RESPIRATION RATE: 17 BRPM | HEIGHT: 65 IN | BODY MASS INDEX: 46.13 KG/M2 | HEART RATE: 88 BPM | WEIGHT: 276.9 LBS | DIASTOLIC BLOOD PRESSURE: 85 MMHG | TEMPERATURE: 97.5 F | OXYGEN SATURATION: 97 %

## 2024-09-11 LAB — AMMONIA PLAS-SCNC: 32 UMOL/L (ref 18–72)

## 2024-09-11 PROCEDURE — 99239 HOSP IP/OBS DSCHRG MGMT >30: CPT | Performed by: FAMILY MEDICINE

## 2024-09-11 PROCEDURE — 82140 ASSAY OF AMMONIA: CPT | Performed by: FAMILY MEDICINE

## 2024-09-11 RX ORDER — OXYCODONE HYDROCHLORIDE 10 MG/1
10 TABLET ORAL EVERY 6 HOURS PRN
Status: DISCONTINUED | OUTPATIENT
Start: 2024-09-11 | End: 2024-09-11 | Stop reason: HOSPADM

## 2024-09-11 RX ADMIN — LIDOCAINE 5% 1 PATCH: 700 PATCH TOPICAL at 08:20

## 2024-09-11 RX ADMIN — SERTRALINE 100 MG: 100 TABLET, FILM COATED ORAL at 08:20

## 2024-09-11 RX ADMIN — OXYCODONE HYDROCHLORIDE 5 MG: 5 TABLET ORAL at 08:19

## 2024-09-11 RX ADMIN — FOLIC ACID 1 MG: 1 TABLET ORAL at 08:19

## 2024-09-11 RX ADMIN — LACTULOSE 30 G: 20 SOLUTION ORAL at 11:28

## 2024-09-11 RX ADMIN — TORSEMIDE 20 MG: 20 TABLET ORAL at 05:48

## 2024-09-11 RX ADMIN — OXYCODONE HYDROCHLORIDE 5 MG: 5 TABLET ORAL at 02:24

## 2024-09-11 RX ADMIN — RIFAXIMIN 550 MG: 550 TABLET ORAL at 08:19

## 2024-09-11 RX ADMIN — ENOXAPARIN SODIUM 40 MG: 40 INJECTION SUBCUTANEOUS at 08:19

## 2024-09-11 RX ADMIN — LACTULOSE 30 G: 20 SOLUTION ORAL at 08:19

## 2024-09-11 RX ADMIN — SPIRONOLACTONE 50 MG: 25 TABLET ORAL at 08:19

## 2024-09-11 NOTE — NURSING NOTE
Discharge instructions reviewed with patient. Patient verbalized understanding of same and agrees to follow up with outpatient appointments.

## 2024-09-11 NOTE — ASSESSMENT & PLAN NOTE
"Patient with history of Larsen cirrhosis-follows with [facility] hepatology/gastroenterology  Maintained in outpatient setting on 3 times daily to 4 times daily lactulose as well as Aldactone  Reported compliance  Elevated ammonia upon presentation with concurrent \"confusion\" per daughter  Increase lactulose dose and also Xifaxan added.  Also given lactulose retention enema and had several large bm.ammonia down to 32 and patient confusion has resolved .  Advised to limit narcotic use to help improve constipation.  Also strongly recommended to be compliant on lactulose    "

## 2024-09-11 NOTE — PLAN OF CARE
Problem: Potential for Falls  Goal: Patient will remain free of falls  Description: INTERVENTIONS:  - Educate patient/family on patient safety including physical limitations  - Instruct patient to call for assistance with activity   - Consult OT/PT to assist with strengthening/mobility   - Keep Call bell within reach  - Keep bed low and locked with side rails adjusted as appropriate  - Keep care items and personal belongings within reach  - Initiate and maintain comfort rounds  - Make Fall Risk Sign visible to staff  - Apply yellow socks and bracelet for high fall risk patients  - Consider moving patient to room near nurses station  Outcome: Progressing     Problem: PAIN - ADULT  Goal: Verbalizes/displays adequate comfort level or baseline comfort level  Description: Interventions:  - Encourage patient to monitor pain and request assistance  - Assess pain using appropriate pain scale  - Administer analgesics based on type and severity of pain and evaluate response  - Implement non-pharmacological measures as appropriate and evaluate response  - Consider cultural and social influences on pain and pain management  - Notify physician/advanced practitioner if interventions unsuccessful or patient reports new pain  Outcome: Progressing     Problem: INFECTION - ADULT  Goal: Absence or prevention of progression during hospitalization  Description: INTERVENTIONS:  - Assess and monitor for signs and symptoms of infection  - Monitor lab/diagnostic results  - Monitor all insertion sites, i.e. indwelling lines, tubes, and drains  - Monitor endotracheal if appropriate and nasal secretions for changes in amount and color  - [place] appropriate cooling/warming therapies per order  - Administer medications as ordered  - Instruct and encourage patient and family to use good hand hygiene technique  - Identify and instruct in appropriate isolation precautions for identified infection/condition  Outcome: Progressing  Goal: Absence  of fever/infection during neutropenic period  Description: INTERVENTIONS:  - Monitor WBC    Outcome: Progressing     Problem: SAFETY ADULT  Goal: Patient will remain free of falls  Description: INTERVENTIONS:  - Educate patient/family on patient safety including physical limitations  - Instruct patient to call for assistance with activity   - Consult OT/PT to assist with strengthening/mobility   - Keep Call bell within reach  - Keep bed low and locked with side rails adjusted as appropriate  - Keep care items and personal belongings within reach  - Initiate and maintain comfort rounds  - Make Fall Risk Sign visible to staff  - Apply yellow socks and bracelet for high fall risk patients  - Consider moving patient to room near nurses station  Outcome: Progressing  Goal: Maintain or return to baseline ADL function  Description: INTERVENTIONS:  -  Assess patient's ability to carry out ADLs; assess patient's baseline for ADL function and identify physical deficits which impact ability to perform ADLs (bathing, care of mouth/teeth, toileting, grooming, dressing, etc.)  - Assess/evaluate cause of self-care deficits   - Assess range of motion  - Assess patient's mobility; develop plan if impaired  - Assess patient's need for assistive devices and provide as appropriate  - Encourage maximum independence but intervene and supervise when necessary  - Involve family in performance of ADLs  - Assess for home care needs following discharge   - Consider OT consult to assist with ADL evaluation and planning for discharge  - Provide patient education as appropriate  Outcome: Progressing  Goal: Maintains/Returns to pre admission functional level  Description: INTERVENTIONS:  - Perform AM-PAC 6 Click Basic Mobility/ Daily Activity assessment daily.  - Set and communicate daily mobility goal to care team and patient/family/caregiver.   - Collaborate with rehabilitation services on mobility goals if consulted  - Record patient progress  and toleration of activity level   Outcome: Progressing     Problem: DISCHARGE PLANNING  Goal: Discharge to home or other facility with appropriate resources  Description: INTERVENTIONS:  - Identify barriers to discharge w/patient and caregiver  - Arrange for needed discharge resources and transportation as appropriate  - Identify discharge learning needs (meds, wound care, etc.)  - Arrange for interpretive services to assist at discharge as needed  - Refer to Case Management Department for coordinating discharge planning if the patient needs post-hospital services based on physician/advanced practitioner order or complex needs related to functional status, cognitive ability, or social support system  Outcome: Progressing     Problem: Knowledge Deficit  Goal: Patient/family/caregiver demonstrates understanding of disease process, treatment plan, medications, and discharge instructions  Description: Complete learning assessment and assess knowledge base.  Interventions:  - Provide teaching at level of understanding  - Provide teaching via preferred learning methods  Outcome: Progressing     Problem: GASTROINTESTINAL - ADULT  Goal: Minimal or absence of nausea and/or vomiting  Description: INTERVENTIONS:  - Administer IV fluids if ordered to ensure adequate hydration  - Maintain NPO status until nausea and vomiting are resolved  - Nasogastric tube if ordered  - Administer ordered antiemetic medications as needed  - Provide nonpharmacologic comfort measures as appropriate  - Advance diet as tolerated, if ordered  - Consider nutrition services referral to assist patient with adequate nutrition and appropriate food choices  Outcome: Progressing  Goal: Maintains or returns to baseline bowel function  Description: INTERVENTIONS:  - Assess bowel function  - Encourage oral fluids to ensure adequate hydration  - Administer IV fluids if ordered to ensure adequate hydration  - Administer ordered medications as needed  -  Encourage mobilization and activity  - Consider nutritional services referral to assist patient with adequate nutrition and appropriate food choices  Outcome: Progressing  Goal: Maintains adequate nutritional intake  Description: INTERVENTIONS:  - Monitor percentage of each meal consumed  - Identify factors contributing to decreased intake, treat as appropriate  - Assist with meals as needed  - Monitor I&O, weight, and lab values if indicated  - Obtain nutrition services referral as needed  Outcome: Progressing     Problem: Prexisting or High Potential for Compromised Skin Integrity  Goal: Skin integrity is maintained or improved  Description: INTERVENTIONS:  - Identify patients at risk for skin breakdown  - Assess and monitor skin integrity  - Assess and monitor nutrition and hydration status  - Monitor labs   - Assess for incontinence   - Turn and reposition patient  - Assist with mobility/ambulation  - Relieve pressure over bony prominences  - Avoid friction and shearing  - Provide appropriate hygiene as needed including keeping skin clean and dry  - Evaluate need for skin moisturizer/barrier cream  - Collaborate with interdisciplinary team   - Patient/family teaching  - Consider wound care consult   Outcome: Progressing

## 2024-09-11 NOTE — ASSESSMENT & PLAN NOTE
Patient presents today (9/8) after sustaining a mechanical fall at home earlier today (approximately 8 hours prior to arrival)  Per patient-tripped over her cat resulting in a fall onto her buttocks-denies head strike-notes acute on chronic low back pain  Patient without complaints of urinary or bowel incontinence/saddle anesthesia  Notes recent history of L1 compression fracture (7/2024)  Patient denies loss of consciousness, lightheadedness, dizziness, aura    In ED, patient without SIRS criteria, without oxygen requirement  Metabolic profile most significant for elevated ammonia  Patient hemodynamically stable  Underwent CT lumbar spine with results as follows:   Superior L1 endplate compression fracture is again noted with increased interval loss of height (now 25 to 30%)  Mild anterior wedging compared to the prior 7/30/2024 CT.   There is new mild retropulsion causing approximately 25% canal compromise.   There is no new fracture or subluxation.  TLSO brace  Was evaluated by neurosurgery at time of initial L1 compression fracture-not surgical candidate  Patient without neurologic compromise-saddle anesthesia/urinary or bowel incontinence  Fall precautions

## 2024-09-11 NOTE — PLAN OF CARE
Problem: Potential for Falls  Goal: Patient will remain free of falls  Description: INTERVENTIONS:  - Educate patient/family on patient safety including physical limitations  - Instruct patient to call for assistance with activity   - Consult OT/PT to assist with strengthening/mobility   - Keep Call bell within reach  - Keep bed low and locked with side rails adjusted as appropriate  - Keep care items and personal belongings within reach  - Initiate and maintain comfort rounds  - Make Fall Risk Sign visible to staff  - Offer Toileting every . Hours, in advance of need  - Initiate/Maintain .alarm  - Obtain necessary fall risk management equipment: .  - Apply yellow socks and bracelet for high fall risk patients  - Consider moving patient to room near nurses station  9/11/2024 1049 by More Montgomery RN  Outcome: Adequate for Discharge  9/11/2024 0938 by More Montgomery RN  Outcome: Progressing     Problem: PAIN - ADULT  Goal: Verbalizes/displays adequate comfort level or baseline comfort level  Description: Interventions:  - Encourage patient to monitor pain and request assistance  - Assess pain using appropriate pain scale  - Administer analgesics based on type and severity of pain and evaluate response  - Implement non-pharmacological measures as appropriate and evaluate response  - Consider cultural and social influences on pain and pain management  - Notify physician/advanced practitioner if interventions unsuccessful or patient reports new pain  9/11/2024 1049 by More Montgomery RN  Outcome: Adequate for Discharge  9/11/2024 0938 by More Montgomery RN  Outcome: Progressing     Problem: INFECTION - ADULT  Goal: Absence or prevention of progression during hospitalization  Description: INTERVENTIONS:  - Assess and monitor for signs and symptoms of infection  - Monitor lab/diagnostic results  - Monitor all insertion sites, i.e. indwelling lines, tubes, and drains  - Monitor endotracheal if appropriate and nasal secretions for  changes in amount and color  - [place] appropriate cooling/warming therapies per order  - Administer medications as ordered  - Instruct and encourage patient and family to use good hand hygiene technique  - Identify and instruct in appropriate isolation precautions for identified infection/condition  9/11/2024 1049 by More Montgomery RN  Outcome: Adequate for Discharge  9/11/2024 0938 by More Montgomery RN  Outcome: Progressing  Goal: Absence of fever/infection during neutropenic period  Description: INTERVENTIONS:  - Monitor WBC    9/11/2024 1049 by More Montgomery RN  Outcome: Adequate for Discharge  9/11/2024 0938 by More Montgomery RN  Outcome: Progressing     Problem: SAFETY ADULT  Goal: Patient will remain free of falls  Description: INTERVENTIONS:  - Educate patient/family on patient safety including physical limitations  - Instruct patient to call for assistance with activity   - Consult OT/PT to assist with strengthening/mobility   - Keep Call bell within reach  - Keep bed low and locked with side rails adjusted as appropriate  - Keep care items and personal belongings within reach  - Initiate and maintain comfort rounds  - Make Fall Risk Sign visible to staff  - Offer Toileting every . Hours, in advance of need  - Initiate/Maintain .alarm  - Obtain necessary fall risk management equipment: .  - Apply yellow socks and bracelet for high fall risk patients  - Consider moving patient to room near nurses station  9/11/2024 1049 by More Montgomery RN  Outcome: Adequate for Discharge  9/11/2024 0938 by More Montgomery RN  Outcome: Progressing  Goal: Maintain or return to baseline ADL function  Description: INTERVENTIONS:  -  Assess patient's ability to carry out ADLs; assess patient's baseline for ADL function and identify physical deficits which impact ability to perform ADLs (bathing, care of mouth/teeth, toileting, grooming, dressing, etc.)  - Assess/evaluate cause of self-care deficits   - Assess range of motion  - Assess  patient's mobility; develop plan if impaired  - Assess patient's need for assistive devices and provide as appropriate  - Encourage maximum independence but intervene and supervise when necessary  - Involve family in performance of ADLs  - Assess for home care needs following discharge   - Consider OT consult to assist with ADL evaluation and planning for discharge  - Provide patient education as appropriate  9/11/2024 1049 by More Montgomery RN  Outcome: Adequate for Discharge  9/11/2024 0938 by More Montgomery RN  Outcome: Progressing  Goal: Maintains/Returns to pre admission functional level  Description: INTERVENTIONS:  - Perform AM-PAC 6 Click Basic Mobility/ Daily Activity assessment daily.  - Set and communicate daily mobility goal to care team and patient/family/caregiver.   - Collaborate with rehabilitation services on mobility goals if consulted  - Perform Range of Motion . times a day.  - Reposition patient every . hours.  - Dangle patient . times a day  - Stand patient . times a day  - Ambulate patient . times a day  - Out of bed to chair . times a day   - Out of bed for meals . times a day  - Out of bed for toileting  - Record patient progress and toleration of activity level   9/11/2024 1049 by More Montgomery RN  Outcome: Adequate for Discharge  9/11/2024 0938 by More Montgomery RN  Outcome: Progressing     Problem: DISCHARGE PLANNING  Goal: Discharge to home or other facility with appropriate resources  Description: INTERVENTIONS:  - Identify barriers to discharge w/patient and caregiver  - Arrange for needed discharge resources and transportation as appropriate  - Identify discharge learning needs (meds, wound care, etc.)  - Arrange for interpretive services to assist at discharge as needed  - Refer to Case Management Department for coordinating discharge planning if the patient needs post-hospital services based on physician/advanced practitioner order or complex needs related to functional status, cognitive  ability, or social support system  9/11/2024 1049 by More Montgomery RN  Outcome: Adequate for Discharge  9/11/2024 0938 by More Montgomery RN  Outcome: Progressing     Problem: Knowledge Deficit  Goal: Patient/family/caregiver demonstrates understanding of disease process, treatment plan, medications, and discharge instructions  Description: Complete learning assessment and assess knowledge base.  Interventions:  - Provide teaching at level of understanding  - Provide teaching via preferred learning methods  9/11/2024 1049 by More Montgomery RN  Outcome: Adequate for Discharge  9/11/2024 0938 by More Montgomery RN  Outcome: Progressing

## 2024-09-11 NOTE — DISCHARGE SUMMARY
"Discharge Summary - Hospitalist   Name: Bailey Olsen 53 y.o. female I MRN: [id number]  Unit/Bed#: -01 I Date of Admission: 9/8/2024   Date of Service: 9/11/2024 I Hospital Day: 3     Assessment & Plan  Fall from standing  Patient presents today (9/8) after sustaining a mechanical fall at home earlier today (approximately 8 hours prior to arrival)  Per patient-tripped over her cat resulting in a fall onto her buttocks-denies head strike-notes acute on chronic low back pain  Patient without complaints of urinary or bowel incontinence/saddle anesthesia  Notes recent history of L1 compression fracture (7/2024)  Patient denies loss of consciousness, lightheadedness, dizziness, aura    In ED, patient without SIRS criteria, without oxygen requirement  Metabolic profile most significant for elevated ammonia  Patient hemodynamically stable  Underwent CT lumbar spine with results as follows:   Superior L1 endplate compression fracture is again noted with increased interval loss of height (now 25 to 30%)  Mild anterior wedging compared to the prior 7/30/2024 CT.   There is new mild retropulsion causing approximately 25% canal compromise.   There is no new fracture or subluxation.  TLSO brace  Was evaluated by neurosurgery at time of initial L1 compression fracture-not surgical candidate  Patient without neurologic compromise-saddle anesthesia/urinary or bowel incontinence  Fall precautions    Hepatic encephalopathy (HCC)  Patient with history of [place] cirrhosis-follows with [facility] hepatology[facility]gastroenterology  Maintained in outpatient setting on 3 times daily to 4 times daily lactulose as well as Aldactone  Reported compliance  Elevated ammonia upon presentation with concurrent \"confusion\" per daughter  Increase lactulose dose and also Xifaxan added.  Also given lactulose retention enema and had several large bm.ammonia down to 32 and patient confusion has resolved .  Advised to limit narcotic use to help improve " constipation.  Also strongly recommended to be compliant on lactulose    Class 3 severe obesity due to excess calories in adult (HCC)  Recommend incorporating a more whole foods plant-predominant diet along with decreasing consumption of red meats and processed foods  Per AHA guidelines, recommend moderate-vigorous intensity exercise for 30 minutes a day for 5 days a week or a total of 150 min/week  Closed compression fracture of L1 lumbar vertebra, sequela  Plan as above     Discharging Physician / Practitioner: Idalia Stuart MD  PCP: Nayely Brown DO  Admission Date:   Admission Orders (From admission, onward)       Ordered        09/08/24 1936  INPATIENT ADMISSION  Once                          Discharge Date: 09/11/24    Medical Problems       Resolved Problems  Date Reviewed: 9/9/2024            Resolved    Chronic diastolic congestive heart failure (HCC) 9/8/2024     Resolved by  Antonio Anaya DO          Consultations During Hospital Stay:  gi    Procedures Performed:   none    Significant Findings / Test Results:   US abdomen limited    Result Date: 9/10/2024  Impression: No evidence of ascites. Workstation performed: [id number]     XR chest portable    Result Date: 9/9/2024  Impression: No acute cardiopulmonary disease. Workstation performed: [id number]     XR sacrum and coccyx    Result Date: 9/9/2024  Impression: No acute osseous abnormality. Computerized Assisted Algorithm (CAA) may have been used to analyze all applicable images. Workstation performed: [id number]     XR pelvis ap only 1 or 2 vw    Result Date: 9/9/2024  Impression: No acute osseous abnormality. Computerized Assisted Algorithm (CAA) may have been used to analyze all applicable images. Workstation performed: [id number]     CT spine thoracic and lumbar wo contrast    Result Date: 9/8/2024  Impression: S-shaped thoracolumbar scoliosis is again noted. Superior L1 endplate compression fracture is again noted with  "increased interval loss of height (now 25 to 30%) and mild anterior wedging compared to the prior 7/30/2024 CT. There is new mild retropulsion causing approximately 25% canal compromise. There  is no new fracture or subluxation. Workstation performed: [id number]     CT head without contrast    Result Date: 9/8/2024  Impression: No acute intracranial abnormality. Workstation performed: [id number]      Incidental Findings:   none    Test Results Pending at Discharge (will require follow up):   none     Outpatient Tests Requested:  Outpt follow up with gi    Complications:  none    Reason for Admission: Fall initial encounter    Hospital Course:     Bailey Olsen is a 53 y.o. female patient who originally presented to the hospital on 9/8/2024 due to fall initial encounter and antibiotic encephalopathy and closed compression fracture of L1.  Patient's ammonia level was 166 and now improved to 32 after lactulose retention enema and increasing dose of lactulose.  Patient is explained that narcotics tend to cause constipation and she must make sure she takes her lactulose regularly to avoid constipation and also to help keep her ammonia levels down.  Recommended to follow-up outpatient with GI to be evaluated for liver transplant also recommended physical therapy for back pain      Please see above list of diagnoses and related plan for additional information.     Condition at Discharge: good     Discharge Day Visit / Exam:     Subjective: Patient denies any chest pain or shortness of breath or abdominal pain.  Feeling better today back pain is controlled with pain medications  Vitals: Blood Pressure: 116/85 (09/11/24 0542)  Pulse: 88 (09/11/24 0542)  Temperature: 97.5 °F (36.4 °C) (09/10/24 2332)  Temp Source: Axillary (09/08/24 2237)  Respirations: 17 (09/10/24 1432)  Height: 5' 5\" (165.1 cm) (09/08/24 2032)  Weight - Scale: 126 kg (276 lb 14.4 oz) (09/08/24 2032)  SpO2: 97 % (09/11/24 0542)  Exam:   Physical Exam  Vitals " and nursing note reviewed.   Constitutional:       Appearance: Normal appearance.   HENT:      Head: Normocephalic and atraumatic.      Right Ear: External ear normal.      Left Ear: External ear normal.      Nose: Nose normal.      Mouth/Throat:      Pharynx: Oropharynx is clear.   Eyes:      Pupils: Pupils are equal, round, and reactive to light.   Cardiovascular:      Rate and Rhythm: Normal rate and regular rhythm.      Heart sounds: Normal heart sounds.   Pulmonary:      Effort: Pulmonary effort is normal.      Breath sounds: Normal breath sounds.   Abdominal:      General: Bowel sounds are normal.      Palpations: Abdomen is soft.      Tenderness: There is no abdominal tenderness.   Musculoskeletal:         General: Normal range of motion.      Cervical back: Normal range of motion and neck supple.   Skin:     General: Skin is warm and dry.      Capillary Refill: Capillary refill takes less than 2 seconds.   Neurological:      Mental Status: She is alert and oriented to person, place, and time. Mental status is at baseline.   Psychiatric:         Mood and Affect: Mood normal.         Discussion with Family: updated spouse    Discharge instructions/Information to patient and family:   See after visit summary for information provided to patient and family.      Provisions for Follow-Up Care:  See after visit summary for information related to follow-up care and any pertinent home health orders.      Disposition:     Home with VNA Services (Reminder: Complete face to face encounter)    For Discharges to [facility]:   Not Applicable to this Patient - Not Applicable to this Patient    Planned Readmission: none     Discharge Statement:  I spent 35 minutes discharging the patient. This time was spent on the day of discharge. I had direct contact with the patient on the day of discharge. Greater than 50% of the total time was spent examining patient, answering all patient questions, arranging and  discussing plan of care with patient as well as directly providing post-discharge instructions.  Additional time then spent on discharge activities.    Discharge Medications:  See after visit summary for reconciled discharge medications provided to patient and family.      ** Please Note: This note has been constructed using a voice recognition system **

## 2024-09-11 NOTE — PLAN OF CARE
Problem: Potential for Falls  Goal: Patient will remain free of falls  Description: INTERVENTIONS:  - Educate patient/family on patient safety including physical limitations  - Instruct patient to call for assistance with activity   - Consult OT/PT to assist with strengthening/mobility   - Keep Call bell within reach  - Keep bed low and locked with side rails adjusted as appropriate  - Keep care items and personal belongings within reach  - Initiate and maintain comfort rounds  - Make Fall Risk Sign visible to staff  - Offer Toileting every 2 Hours, in advance of need  - Initiate/Maintain bed alarm  - Obtain necessary fall risk management equipment: socks  - Apply yellow socks and bracelet for high fall risk patients  - Consider moving patient to room near nurses station  Outcome: Progressing     Problem: PAIN - ADULT  Goal: Verbalizes/displays adequate comfort level or baseline comfort level  Description: Interventions:  - Encourage patient to monitor pain and request assistance  - Assess pain using appropriate pain scale  - Administer analgesics based on type and severity of pain and evaluate response  - Implement non-pharmacological measures as appropriate and evaluate response  - Consider cultural and social influences on pain and pain management  - Notify physician/advanced practitioner if interventions unsuccessful or patient reports new pain  Outcome: Progressing     Problem: INFECTION - ADULT  Goal: Absence or prevention of progression during hospitalization  Description: INTERVENTIONS:  - Assess and monitor for signs and symptoms of infection  - Monitor lab/diagnostic results  - Monitor all insertion sites, i.e. indwelling lines, tubes, and drains  - Monitor endotracheal if appropriate and nasal secretions for changes in amount and color  - [place] appropriate cooling/warming therapies per order  - Administer medications as ordered  - Instruct and encourage patient and family to use good hand hygiene  technique  - Identify and instruct in appropriate isolation precautions for identified infection/condition  Outcome: Progressing  Goal: Absence of fever/infection during neutropenic period  Description: INTERVENTIONS:  - Monitor WBC    Outcome: Progressing     Problem: SAFETY ADULT  Goal: Patient will remain free of falls  Description: INTERVENTIONS:  - Educate patient/family on patient safety including physical limitations  - Instruct patient to call for assistance with activity   - Consult OT/PT to assist with strengthening/mobility   - Keep Call bell within reach  - Keep bed low and locked with side rails adjusted as appropriate  - Keep care items and personal belongings within reach  - Initiate and maintain comfort rounds  - Make Fall Risk Sign visible to staff  - Offer Toileting every 2 Hours, in advance of need  - Initiate/Maintain bed alarm  - Obtain necessary fall risk management equipment: socks  - Apply yellow socks and bracelet for high fall risk patients  - Consider moving patient to room near nurses station  Outcome: Progressing  Goal: Maintain or return to baseline ADL function  Description: INTERVENTIONS:  -  Assess patient's ability to carry out ADLs; assess patient's baseline for ADL function and identify physical deficits which impact ability to perform ADLs (bathing, care of mouth/teeth, toileting, grooming, dressing, etc.)  - Assess/evaluate cause of self-care deficits   - Assess range of motion  - Assess patient's mobility; develop plan if impaired  - Assess patient's need for assistive devices and provide as appropriate  - Encourage maximum independence but intervene and supervise when necessary  - Involve family in performance of ADLs  - Assess for home care needs following discharge   - Consider OT consult to assist with ADL evaluation and planning for discharge  - Provide patient education as appropriate  Outcome: Progressing  Goal: Maintains/Returns to pre admission functional  level  Description: INTERVENTIONS:  - Perform AM-PAC 6 Click Basic Mobility/ Daily Activity assessment daily.  - Set and communicate daily mobility goal to care team and patient/family/caregiver.   - Collaborate with rehabilitation services on mobility goals if consulted  - Perform Range of Motion 2 times a day.  - Reposition patient every 2 hours.  - Dangle patient 2 times a day  - Stand patient 2 times a day  - Ambulate patient 2 times a day  - Out of bed to chair 2 times a day   - Out of bed for meals 2 times a day  - Out of bed for toileting  - Record patient progress and toleration of activity level   Outcome: Progressing

## 2024-09-11 NOTE — CASE MANAGEMENT
Case Management Discharge Planning Note    Patient name Bailey Olsen  Location /-01 MRN [id number]  : 1970 Date 2024       Current Admission Date: 2024  Current Admission Diagnosis:Fall from standing   Patient Active Problem List    Diagnosis Date Noted Date Diagnosed    Fall from standing 2024     Closed compression fracture of L1 lumbar vertebra, sequela 2024     Closed stable burst fracture of first lumbar vertebra with routine healing 2024     Groin hematoma 2024     Ambulatory dysfunction 2024     Intractable back pain 2024     Hyperammonemia (HCC) 2024     Hepatic encephalopathy (HCC) 2024     Fall 2024     Other cirrhosis of liver (HCC) 2024     History of malignant carcinoid tumor of small intestine 2024     Class 3 severe obesity due to excess calories in adult (HCC) 2024     Primary hypertension 2024     Anxiety 2024     Hypokalemia 2024     Fibromyalgia 2024     Iron deficiency anemia 2024     Pancytopenia (HCC) 2024     Sarcoidosis 2024     Bilateral pulmonary infiltrates 2024       LOS (days): 3  Geometric Mean LOS (GMLOS) (days):   Days to GMLOS:     OBJECTIVE:  Risk of Unplanned Readmission Score: 28.92         Current admission status: Inpatient   Preferred Pharmacy:   [facility] [address] [place] [address] - [address] [facility]  [address] [facility]   Phone: [phone] Fax: [phone]    [facility]/pharmacy #[id number] - [place], [place] - [address] [facility]  [address] [facility]   Phone: [phone] Fax: [phone]    Primary Care Provider: Nayely Brown DO    Primary Insurance: [facility]  Secondary Insurance:     DISCHARGE DETAILS:    Discharge planning discussed with:: patient  Freedom of Choice: Yes  Comments - Freedom of Choice: Discussed functional improvement and recommendation  "for therapy has changed to HHC. Per patient she does not desire HHC at this time. CM asked her if she was interested in OP therapy, pt politely declined        Requested Home Health Care         Is the patient interested in HHC at discharge?: No    DME Referral Provided  Referral made for DME?: No       Treatment Team Recommendation: Home with Home Health Care  Discharge Destination Plan:: Home  Transport at Discharge : Family       Called [facility] PCP to make a post dc appointment \" there policy is an appointment can not be made until the patient is dc and the patient needs to make the appointment.\"                                           "

## 2024-09-12 NOTE — UTILIZATION REVIEW
NOTIFICATION OF ADMISSION DISCHARGE   This is a Notification of Discharge from [facility]. Please be advised that this patient has been discharge from our facility. Below you will find the admission and discharge date and time including the patient’s disposition.   UTILIZATION REVIEW CONTACT:  Елена Self  Utilization   Network Utilization Review Department  Phone: [phone] x carefully listen to the prompts. All voicemails are confidential.  Email: [email or web address]     ADMISSION INFORMATION  PRESENTATION DATE: 9/8/2024  5:55 PM  OBERVATION ADMISSION DATE: N/A  INPATIENT ADMISSION DATE: 9/8/24  7:36 PM   DISCHARGE DATE: 9/11/2024 12:33 PM   DISPOSITION:Home with Home Health Care    Network Utilization Review Department  ATTENTION: Please call with any questions or concerns to [phone] and carefully listen to the prompts so that you are directed to the right person. All voicemails are confidential.   For Discharge needs, contact Care Management DC Support Team at [phone] opt. 2  Send all requests for admission clinical reviews, approved or denied determinations and any other requests to dedicated fax number below belonging to the campus where the patient is receiving treatment. List of dedicated fax numbers for the Facilities:  FACILITY NAME UR FAX NUMBER   ADMISSION DENIALS (Administrative/Medical Necessity) [phone]   DISCHARGE SUPPORT TEAM ([facility]) [phone]   PARENT CHILD HEALTH (Maternity/NICU/Pediatrics) [phone]   [facility] [phone]   [facility] [phone]   On license of [facility] [phone]   [facility] [phone]   [facility] [phone]   [facility] [phone]   [facility] [phone]   [facility]  [place] [phone]   [facility] [phone]   [facility] [phone]   [facility] [phone]   [facility] [phone]

## 2024-09-13 ENCOUNTER — HOSPITAL ENCOUNTER (OUTPATIENT)
Dept: MRI IMAGING | Facility: HOSPITAL | Age: 54
End: 2024-09-13
Payer: COMMERCIAL

## 2024-09-13 DIAGNOSIS — S32.010A CLOSED COMPRESSION FRACTURE OF BODY OF L1 VERTEBRA (HCC): ICD-10-CM

## 2024-09-13 PROCEDURE — 72148 MRI LUMBAR SPINE W/O DYE: CPT

## 2024-09-16 ENCOUNTER — TELEPHONE (OUTPATIENT)
Age: 54
End: 2024-09-16

## 2024-09-16 NOTE — TELEPHONE ENCOUNTER
TERI Husain MA  Patient completed MRI. If still with bad pain and interested in kypho, should be seen at [facility] as d/w Dr. Davis.    If we can't get her in at [facility], I can still see this week in [facility] then will need an additional appt at [facility] if plan for kypho    Left voicemail inquiring if patient is experiencing severe pain and is interested in a Kyphoplasty, if so, I want to r/s 9/18 appt to be with Ryan on 9/27 at 12:30 (urgent slot), instructed pt to return call.   [FreeTextEntry1] : I was not able to get a handle on exactly how Red was feeling during this interview. Counseling is certainly unknown.

## 2024-09-16 NOTE — TELEPHONE ENCOUNTER
Pts sister called in to accept appt on 9/20 @ 12:30 with Dr. Davis.   I let pts sister know that she would get a call back ASAP to confirm appt.     Please assist.     Thank you.

## 2024-09-16 NOTE — TELEPHONE ENCOUNTER
Pt's sister Fidelina (ok per EC) called to gather more information about upcoming appt and possible change in providers/location.  Relayed information from Clarice Velarde PA-C in regards to updated MRI.  Fidelina states that her sister recently took another fall and was admitted to  at which time update MRI was obtained.  Fidelina states that she does know that the pt is experiencing severe pain, but she is currently unsure of her desire to proceed with Kypho or not.  Fidelina is going to call pt now and discuss further and will CB to inform us of her decision for upcoming f/u appt.

## 2024-09-16 NOTE — TELEPHONE ENCOUNTER
Called patient back she is aware appt was r/s to 9/27 with Dr. Davis. She was appreciative of the call.

## 2024-09-19 ENCOUNTER — VBI (OUTPATIENT)
Dept: ADMINISTRATIVE | Facility: OTHER | Age: 54
End: 2024-09-19

## 2024-09-19 NOTE — TELEPHONE ENCOUNTER
09/19/24 10:02 AM    The patient was called and the phone number was provided and/or the patient agreed to be transferred to the Central Scheduling department.    Thank you.  Mayra Alonzo MA  [facility]

## 2024-09-27 ENCOUNTER — OFFICE VISIT (OUTPATIENT)
Dept: NEUROSURGERY | Facility: CLINIC | Age: 54
End: 2024-09-27
Payer: COMMERCIAL

## 2024-09-27 VITALS
OXYGEN SATURATION: 93 % | BODY MASS INDEX: 42.15 KG/M2 | WEIGHT: 253 LBS | TEMPERATURE: 98.2 F | HEART RATE: 90 BPM | DIASTOLIC BLOOD PRESSURE: 82 MMHG | SYSTOLIC BLOOD PRESSURE: 136 MMHG | RESPIRATION RATE: 19 BRPM | HEIGHT: 65 IN

## 2024-09-27 DIAGNOSIS — S32.010A CLOSED COMPRESSION FRACTURE OF BODY OF L1 VERTEBRA (HCC): Primary | ICD-10-CM

## 2024-09-27 DIAGNOSIS — Z79.01 LONG TERM (CURRENT) USE OF ANTICOAGULANTS: ICD-10-CM

## 2024-09-27 DIAGNOSIS — Z79.899 ENCOUNTER FOR LONG-TERM (CURRENT) USE OF MEDICATIONS: ICD-10-CM

## 2024-09-27 DIAGNOSIS — M80.88XA PATHOLOGICAL FRACTURE OF VERTEBRA DUE TO SECONDARY OSTEOPOROSIS (HCC): ICD-10-CM

## 2024-09-27 DIAGNOSIS — D61.818 PANCYTOPENIA (HCC): ICD-10-CM

## 2024-09-27 DIAGNOSIS — Z01.812 PRE-OPERATIVE LABORATORY EXAMINATION: ICD-10-CM

## 2024-09-27 PROCEDURE — 99215 OFFICE O/P EST HI 40 MIN: CPT | Performed by: RADIOLOGY

## 2024-09-27 RX ORDER — SODIUM CHLORIDE 9 MG/ML
75 INJECTION, SOLUTION INTRAVENOUS CONTINUOUS
OUTPATIENT
Start: 2024-09-27

## 2024-09-27 NOTE — ASSESSMENT & PLAN NOTE
Bailey has ongoing severe pain due to her L1 fracture.  She has failed conservative therapy and there is progression of the fracture.  I have recommended L1 kyphoplasty with biopsy.  She understands the risks and has elected proceed.  In particular there is elevated risk of bleeding with her thrombocytopenia and mildly elevated INR.  She will require labs on admission with likely platelet transfusion and possibly FFP.  She requires medical and Cardiology clearance.   Orders:    IR KYPHOPLASTY/VERTEBROPLASTY; Future

## 2024-09-27 NOTE — PROGRESS NOTES
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  She has failed conservative therapy and there is progression of the fracture.  I have recommended L1 kyphoplasty with biopsy.  She understands the risks and has elected proceed.  In particular there is elevated risk of bleeding with her thrombocytopenia and mildly elevated INR.  She will require labs on admission with likely platelet transfusion and possibly FFP.  She requires medical and Cardiology clearance.   Orders:    IR KYPHOPLASTY/VERTEBROPLASTY; Future    Pancytopenia (HCC)  See above         History of Present Illness   HPI  Ms. Olsen returns for follow-up of her back pain in the setting of a L1 compression fracture.  She reports ongoing lower back pain that is often 8 out of 10 in severity.  Pain radiates around her ribs and into her groin.  Denies any associated leg weakness.  She is wearing her TLSO brace consistently but only mild relief.     Review of Systems   Constitutional: Negative.    HENT: Negative.     Eyes: Negative.    Respiratory: Negative.     Cardiovascular: Negative.    Gastrointestinal: Negative.    Endocrine: Negative.    Genitourinary: Negative.    Musculoskeletal:  Positive for back pain (mid to lower back pain lower back worse radiates to right side of ribs  radiates to both hips and groin) and gait problem (unsteady at times). Negative for myalgias.   Skin: Negative.    Allergic/Immunologic: Negative.    Neurological:  Positive for weakness (legs). Negative for seizures and numbness.   Hematological:  Bruises/bleeds easily.   Psychiatric/Behavioral:  Positive for sleep disturbance (takes pain meds to help sleep through  "pain).      I have personally reviewed the MA's review of systems and made changes as necessary.      Objective     Resp 19   Ht 5' 5\" (1.651 m)   Wt 115 kg (253 lb)   BMI 42.10 kg/m²     Physical Exam  Constitutional:       Appearance: Normal appearance. She is obese.   HENT:      Head: Normocephalic and atraumatic.   Eyes:      Pupils: Pupils are equal, round, and reactive to light.   Pulmonary:      Effort: Pulmonary effort is normal.   Musculoskeletal:         General: Tenderness present. Normal range of motion.   Skin:     General: Skin is warm and dry.   Neurological:      General: No focal deficit present.      Mental Status: She is alert and oriented to person, place, and time.      Cranial Nerves: No cranial nerve deficit.      Sensory: No sensory deficit.      Motor: No weakness.   Psychiatric:         Mood and Affect: Mood normal.         Behavior: Behavior normal.         Thought Content: Thought content normal.         Judgment: Judgment normal.       Neurologic Exam     Mental Status   Oriented to person, place, and time.     Cranial Nerves     CN III, IV, VI   Pupils are equal, round, and reactive to light.            "

## 2024-10-01 DIAGNOSIS — I10 ESSENTIAL (PRIMARY) HYPERTENSION: ICD-10-CM

## 2024-10-04 ENCOUNTER — APPOINTMENT (EMERGENCY)
Dept: CT IMAGING | Facility: HOSPITAL | Age: 54
DRG: 433 | End: 2024-10-04
Payer: MEDICARE

## 2024-10-04 ENCOUNTER — HOSPITAL ENCOUNTER (INPATIENT)
Facility: HOSPITAL | Age: 54
LOS: 2 days | Discharge: HOME HEALTH | DRG: 433 | End: 2024-10-06
Attending: STUDENT IN AN ORGANIZED HEALTH CARE EDUCATION/TRAINING PROGRAM | Admitting: FAMILY MEDICINE
Payer: MEDICARE

## 2024-10-04 DIAGNOSIS — N17.9 AKI (ACUTE KIDNEY INJURY) (HCC): Primary | ICD-10-CM

## 2024-10-04 DIAGNOSIS — K76.82 HEPATIC ENCEPHALOPATHY (HCC): ICD-10-CM

## 2024-10-04 DIAGNOSIS — I50.9 CHF (CONGESTIVE HEART FAILURE) (HCC): ICD-10-CM

## 2024-10-04 DIAGNOSIS — K74.60 LIVER CIRRHOSIS SECONDARY TO NASH (HCC): ICD-10-CM

## 2024-10-04 DIAGNOSIS — S32.010S CLOSED COMPRESSION FRACTURE OF L1 LUMBAR VERTEBRA, SEQUELA: ICD-10-CM

## 2024-10-04 DIAGNOSIS — S91.311A LACERATION OF RIGHT HEEL, INITIAL ENCOUNTER: ICD-10-CM

## 2024-10-04 DIAGNOSIS — R79.89 INCREASED AMMONIA LEVEL: ICD-10-CM

## 2024-10-04 DIAGNOSIS — K75.81 LIVER CIRRHOSIS SECONDARY TO NASH (HCC): ICD-10-CM

## 2024-10-04 DIAGNOSIS — E87.6 HYPOKALEMIA: ICD-10-CM

## 2024-10-04 PROBLEM — E66.01 MORBID OBESITY WITH BMI OF 40.0-44.9, ADULT (HCC): Status: ACTIVE | Noted: 2024-10-04

## 2024-10-04 LAB
ALBUMIN SERPL BCG-MCNC: 3.2 G/DL (ref 3.5–5)
ALP SERPL-CCNC: 213 U/L (ref 34–104)
ALT SERPL W P-5'-P-CCNC: 22 U/L (ref 7–52)
AMMONIA PLAS-SCNC: 116 UMOL/L (ref 18–72)
ANION GAP SERPL CALCULATED.3IONS-SCNC: 7 MMOL/L (ref 4–13)
ANION GAP SERPL CALCULATED.3IONS-SCNC: 7 MMOL/L (ref 4–13)
AST SERPL W P-5'-P-CCNC: 47 U/L (ref 13–39)
BACTERIA UR QL AUTO: NORMAL /HPF
BASE EX.OXY STD BLDV CALC-SCNC: 43.2 % (ref 60–80)
BASE EXCESS BLDV CALC-SCNC: 10.5 MMOL/L
BASOPHILS # BLD AUTO: 0.04 THOUSANDS/ΜL (ref 0–0.1)
BASOPHILS NFR BLD AUTO: 1 % (ref 0–1)
BILIRUB DIRECT SERPL-MCNC: 0.38 MG/DL (ref 0–0.2)
BILIRUB SERPL-MCNC: 1.31 MG/DL (ref 0.2–1)
BILIRUB UR QL STRIP: NEGATIVE
BUN SERPL-MCNC: 27 MG/DL (ref 5–25)
BUN SERPL-MCNC: 30 MG/DL (ref 5–25)
CALCIUM SERPL-MCNC: 8.5 MG/DL (ref 8.4–10.2)
CALCIUM SERPL-MCNC: 8.7 MG/DL (ref 8.4–10.2)
CHLORIDE SERPL-SCNC: 94 MMOL/L (ref 96–108)
CHLORIDE SERPL-SCNC: 97 MMOL/L (ref 96–108)
CLARITY UR: CLEAR
CO2 SERPL-SCNC: 36 MMOL/L (ref 21–32)
CO2 SERPL-SCNC: 37 MMOL/L (ref 21–32)
COLOR UR: YELLOW
CREAT SERPL-MCNC: 1.23 MG/DL (ref 0.6–1.3)
CREAT SERPL-MCNC: 1.66 MG/DL (ref 0.6–1.3)
EOSINOPHIL # BLD AUTO: 0.08 THOUSAND/ΜL (ref 0–0.61)
EOSINOPHIL NFR BLD AUTO: 3 % (ref 0–6)
ERYTHROCYTE [DISTWIDTH] IN BLOOD BY AUTOMATED COUNT: 14.7 % (ref 11.6–15.1)
GFR SERPL CREATININE-BSD FRML MDRD: 34 ML/MIN/1.73SQ M
GFR SERPL CREATININE-BSD FRML MDRD: 49 ML/MIN/1.73SQ M
GLUCOSE SERPL-MCNC: 109 MG/DL (ref 65–140)
GLUCOSE SERPL-MCNC: 122 MG/DL (ref 65–140)
GLUCOSE UR STRIP-MCNC: NEGATIVE MG/DL
HCO3 BLDV-SCNC: 36.6 MMOL/L (ref 24–30)
HCT VFR BLD AUTO: 33.3 % (ref 34.8–46.1)
HGB BLD-MCNC: 10.8 G/DL (ref 11.5–15.4)
HGB UR QL STRIP.AUTO: ABNORMAL
IMM GRANULOCYTES # BLD AUTO: 0.01 THOUSAND/UL (ref 0–0.2)
IMM GRANULOCYTES NFR BLD AUTO: 0 % (ref 0–2)
INR PPP: 1.19 (ref 0.85–1.19)
KETONES UR STRIP-MCNC: NEGATIVE MG/DL
LACTATE SERPL-SCNC: 1.8 MMOL/L (ref 0.5–2)
LEUKOCYTE ESTERASE UR QL STRIP: NEGATIVE
LIPASE SERPL-CCNC: 10 U/L (ref 11–82)
LYMPHOCYTES # BLD AUTO: 0.56 THOUSANDS/ΜL (ref 0.6–4.47)
LYMPHOCYTES NFR BLD AUTO: 20 % (ref 14–44)
MAGNESIUM SERPL-MCNC: 2 MG/DL (ref 1.9–2.7)
MCH RBC QN AUTO: 30.9 PG (ref 26.8–34.3)
MCHC RBC AUTO-ENTMCNC: 32.4 G/DL (ref 31.4–37.4)
MCV RBC AUTO: 95 FL (ref 82–98)
MONOCYTES # BLD AUTO: 0.17 THOUSAND/ΜL (ref 0.17–1.22)
MONOCYTES NFR BLD AUTO: 6 % (ref 4–12)
NEUTROPHILS # BLD AUTO: 1.92 THOUSANDS/ΜL (ref 1.85–7.62)
NEUTS SEG NFR BLD AUTO: 70 % (ref 43–75)
NITRITE UR QL STRIP: NEGATIVE
NON-SQ EPI CELLS URNS QL MICRO: NORMAL /HPF
NRBC BLD AUTO-RTO: 0 /100 WBCS
O2 CT BLDV-SCNC: 7.4 ML/DL
PCO2 BLDV: 55.7 MM HG (ref 42–50)
PH BLDV: 7.43 [PH] (ref 7.3–7.4)
PH UR STRIP.AUTO: 6.5 [PH]
PLATELET # BLD AUTO: 68 THOUSANDS/UL (ref 149–390)
PMV BLD AUTO: 9.6 FL (ref 8.9–12.7)
PO2 BLDV: 24.7 MM HG (ref 35–45)
POTASSIUM SERPL-SCNC: 2.9 MMOL/L (ref 3.5–5.3)
POTASSIUM SERPL-SCNC: 3.5 MMOL/L (ref 3.5–5.3)
PROT SERPL-MCNC: 7.3 G/DL (ref 6.4–8.4)
PROT UR STRIP-MCNC: NEGATIVE MG/DL
PROTHROMBIN TIME: 15.5 SECONDS (ref 12.3–15)
RBC # BLD AUTO: 3.49 MILLION/UL (ref 3.81–5.12)
RBC #/AREA URNS AUTO: NORMAL /HPF
SODIUM SERPL-SCNC: 138 MMOL/L (ref 135–147)
SODIUM SERPL-SCNC: 140 MMOL/L (ref 135–147)
SP GR UR STRIP.AUTO: <=1.005 (ref 1–1.03)
UROBILINOGEN UR QL STRIP.AUTO: 0.2 E.U./DL
WBC # BLD AUTO: 2.78 THOUSAND/UL (ref 4.31–10.16)
WBC #/AREA URNS AUTO: NORMAL /HPF

## 2024-10-04 PROCEDURE — 99285 EMERGENCY DEPT VISIT HI MDM: CPT

## 2024-10-04 PROCEDURE — 99223 1ST HOSP IP/OBS HIGH 75: CPT | Performed by: FAMILY MEDICINE

## 2024-10-04 PROCEDURE — 96365 THER/PROPH/DIAG IV INF INIT: CPT

## 2024-10-04 PROCEDURE — 83605 ASSAY OF LACTIC ACID: CPT | Performed by: STUDENT IN AN ORGANIZED HEALTH CARE EDUCATION/TRAINING PROGRAM

## 2024-10-04 PROCEDURE — 81001 URINALYSIS AUTO W/SCOPE: CPT | Performed by: STUDENT IN AN ORGANIZED HEALTH CARE EDUCATION/TRAINING PROGRAM

## 2024-10-04 PROCEDURE — 80048 BASIC METABOLIC PNL TOTAL CA: CPT | Performed by: FAMILY MEDICINE

## 2024-10-04 PROCEDURE — 85610 PROTHROMBIN TIME: CPT | Performed by: STUDENT IN AN ORGANIZED HEALTH CARE EDUCATION/TRAINING PROGRAM

## 2024-10-04 PROCEDURE — 71260 CT THORAX DX C+: CPT

## 2024-10-04 PROCEDURE — 83735 ASSAY OF MAGNESIUM: CPT | Performed by: STUDENT IN AN ORGANIZED HEALTH CARE EDUCATION/TRAINING PROGRAM

## 2024-10-04 PROCEDURE — 70450 CT HEAD/BRAIN W/O DYE: CPT

## 2024-10-04 PROCEDURE — 99285 EMERGENCY DEPT VISIT HI MDM: CPT | Performed by: STUDENT IN AN ORGANIZED HEALTH CARE EDUCATION/TRAINING PROGRAM

## 2024-10-04 PROCEDURE — 72125 CT NECK SPINE W/O DYE: CPT

## 2024-10-04 PROCEDURE — 74177 CT ABD & PELVIS W/CONTRAST: CPT

## 2024-10-04 PROCEDURE — 82805 BLOOD GASES W/O2 SATURATION: CPT | Performed by: STUDENT IN AN ORGANIZED HEALTH CARE EDUCATION/TRAINING PROGRAM

## 2024-10-04 PROCEDURE — 36415 COLL VENOUS BLD VENIPUNCTURE: CPT | Performed by: STUDENT IN AN ORGANIZED HEALTH CARE EDUCATION/TRAINING PROGRAM

## 2024-10-04 PROCEDURE — 80048 BASIC METABOLIC PNL TOTAL CA: CPT | Performed by: STUDENT IN AN ORGANIZED HEALTH CARE EDUCATION/TRAINING PROGRAM

## 2024-10-04 PROCEDURE — 83690 ASSAY OF LIPASE: CPT | Performed by: STUDENT IN AN ORGANIZED HEALTH CARE EDUCATION/TRAINING PROGRAM

## 2024-10-04 PROCEDURE — 85025 COMPLETE CBC W/AUTO DIFF WBC: CPT | Performed by: STUDENT IN AN ORGANIZED HEALTH CARE EDUCATION/TRAINING PROGRAM

## 2024-10-04 PROCEDURE — 82140 ASSAY OF AMMONIA: CPT | Performed by: STUDENT IN AN ORGANIZED HEALTH CARE EDUCATION/TRAINING PROGRAM

## 2024-10-04 PROCEDURE — 80076 HEPATIC FUNCTION PANEL: CPT | Performed by: STUDENT IN AN ORGANIZED HEALTH CARE EDUCATION/TRAINING PROGRAM

## 2024-10-04 RX ORDER — NYSTATIN 100000 [USP'U]/G
POWDER TOPICAL 2 TIMES DAILY
Status: DISCONTINUED | OUTPATIENT
Start: 2024-10-04 | End: 2024-10-06 | Stop reason: HOSPADM

## 2024-10-04 RX ORDER — SODIUM CHLORIDE 9 MG/ML
50 INJECTION, SOLUTION INTRAVENOUS CONTINUOUS
Status: DISCONTINUED | OUTPATIENT
Start: 2024-10-04 | End: 2024-10-05

## 2024-10-04 RX ORDER — ACETAMINOPHEN 325 MG/1
650 TABLET ORAL EVERY 6 HOURS PRN
Status: DISCONTINUED | OUTPATIENT
Start: 2024-10-04 | End: 2024-10-06 | Stop reason: HOSPADM

## 2024-10-04 RX ORDER — SERTRALINE HYDROCHLORIDE 100 MG/1
100 TABLET, FILM COATED ORAL DAILY
Status: DISCONTINUED | OUTPATIENT
Start: 2024-10-04 | End: 2024-10-06 | Stop reason: HOSPADM

## 2024-10-04 RX ORDER — LACTULOSE 10 G/15ML
30 SOLUTION ORAL
Status: DISCONTINUED | OUTPATIENT
Start: 2024-10-04 | End: 2024-10-05

## 2024-10-04 RX ORDER — OXYCODONE HYDROCHLORIDE 5 MG/1
5 TABLET ORAL EVERY 6 HOURS PRN
Status: DISCONTINUED | OUTPATIENT
Start: 2024-10-04 | End: 2024-10-05

## 2024-10-04 RX ORDER — FOLIC ACID 1 MG/1
1 TABLET ORAL DAILY
Status: DISCONTINUED | OUTPATIENT
Start: 2024-10-04 | End: 2024-10-06 | Stop reason: HOSPADM

## 2024-10-04 RX ORDER — POTASSIUM CHLORIDE 1500 MG/1
40 TABLET, EXTENDED RELEASE ORAL
Status: COMPLETED | OUTPATIENT
Start: 2024-10-04 | End: 2024-10-04

## 2024-10-04 RX ORDER — FENTANYL CITRATE 50 UG/ML
75 INJECTION, SOLUTION INTRAMUSCULAR; INTRAVENOUS ONCE
Status: COMPLETED | OUTPATIENT
Start: 2024-10-04 | End: 2024-10-04

## 2024-10-04 RX ORDER — SODIUM CHLORIDE, SODIUM GLUCONATE, SODIUM ACETATE, POTASSIUM CHLORIDE, MAGNESIUM CHLORIDE, SODIUM PHOSPHATE, DIBASIC, AND POTASSIUM PHOSPHATE .53; .5; .37; .037; .03; .012; .00082 G/100ML; G/100ML; G/100ML; G/100ML; G/100ML; G/100ML; G/100ML
1000 INJECTION, SOLUTION INTRAVENOUS ONCE
Status: COMPLETED | OUTPATIENT
Start: 2024-10-04 | End: 2024-10-04

## 2024-10-04 RX ORDER — OXYCODONE HYDROCHLORIDE 5 MG/1
2.5 TABLET ORAL EVERY 6 HOURS PRN
Status: DISCONTINUED | OUTPATIENT
Start: 2024-10-04 | End: 2024-10-05

## 2024-10-04 RX ORDER — GABAPENTIN 100 MG/1
200 CAPSULE ORAL 3 TIMES DAILY
Status: DISCONTINUED | OUTPATIENT
Start: 2024-10-04 | End: 2024-10-06 | Stop reason: HOSPADM

## 2024-10-04 RX ORDER — LACTULOSE 10 G/15ML
20 SOLUTION ORAL 4 TIMES DAILY
Status: DISCONTINUED | OUTPATIENT
Start: 2024-10-04 | End: 2024-10-04

## 2024-10-04 RX ADMIN — POTASSIUM CHLORIDE 40 MEQ: 1500 TABLET, EXTENDED RELEASE ORAL at 10:48

## 2024-10-04 RX ADMIN — FOLIC ACID 1 MG: 1 TABLET ORAL at 10:48

## 2024-10-04 RX ADMIN — POTASSIUM CHLORIDE 40 MEQ: 1500 TABLET, EXTENDED RELEASE ORAL at 07:57

## 2024-10-04 RX ADMIN — LACTULOSE 30 G: 20 SOLUTION ORAL at 10:48

## 2024-10-04 RX ADMIN — RIFAXIMIN 550 MG: 550 TABLET ORAL at 10:48

## 2024-10-04 RX ADMIN — LACTULOSE 30 G: 20 SOLUTION ORAL at 13:58

## 2024-10-04 RX ADMIN — POTASSIUM CHLORIDE 40 MEQ: 1500 TABLET, EXTENDED RELEASE ORAL at 09:55

## 2024-10-04 RX ADMIN — OXYCODONE HYDROCHLORIDE 5 MG: 5 TABLET ORAL at 14:07

## 2024-10-04 RX ADMIN — SODIUM CHLORIDE, SODIUM GLUCONATE, SODIUM ACETATE, POTASSIUM CHLORIDE, MAGNESIUM CHLORIDE, SODIUM PHOSPHATE, DIBASIC, AND POTASSIUM PHOSPHATE 1000 ML: .53; .5; .37; .037; .03; .012; .00082 INJECTION, SOLUTION INTRAVENOUS at 07:22

## 2024-10-04 RX ADMIN — SODIUM CHLORIDE 100 ML/HR: 0.9 INJECTION, SOLUTION INTRAVENOUS at 10:51

## 2024-10-04 RX ADMIN — SERTRALINE 100 MG: 100 TABLET, FILM COATED ORAL at 10:48

## 2024-10-04 RX ADMIN — GABAPENTIN 200 MG: 100 CAPSULE ORAL at 15:54

## 2024-10-04 RX ADMIN — FENTANYL CITRATE 75 MCG: 50 INJECTION INTRAMUSCULAR; INTRAVENOUS at 08:12

## 2024-10-04 RX ADMIN — RIFAXIMIN 550 MG: 550 TABLET ORAL at 22:06

## 2024-10-04 RX ADMIN — GABAPENTIN 200 MG: 100 CAPSULE ORAL at 22:06

## 2024-10-04 RX ADMIN — GABAPENTIN 200 MG: 100 CAPSULE ORAL at 10:48

## 2024-10-04 RX ADMIN — LACTULOSE 30 G: 20 SOLUTION ORAL at 12:18

## 2024-10-04 RX ADMIN — ACETAMINOPHEN 325MG 650 MG: 325 TABLET ORAL at 15:54

## 2024-10-04 RX ADMIN — LACTULOSE 200 G: 10 SOLUTION ORAL at 12:19

## 2024-10-04 RX ADMIN — NYSTATIN: 100000 POWDER TOPICAL at 18:10

## 2024-10-04 RX ADMIN — IOHEXOL 100 ML: 350 INJECTION, SOLUTION INTRAVENOUS at 07:16

## 2024-10-04 NOTE — ASSESSMENT & PLAN NOTE
Patient presented with a fall last night at home.  She has issues of L1 fracture in the past and chronic back pain issues.  Now again here with physical deconditioning and fall.  Consult pt/ot and neurosurgery  Pain control ,lso brace

## 2024-10-04 NOTE — ASSESSMENT & PLAN NOTE
Patient found to have hepatic encephalopathy.  Level elevated to 119.  Patient states that she ran out of her lactulose yesterday.  She also states that she has not had a bowel movement in over 3 days despite taking MiraLAX as well.  Will give lactulose retention enema followed by oral lactulose 30 g every 2 hours until patient has at least 3-4 soft bowel movements.  Recheck ammonia level in AM.  Is also on chronic narcotic therapy for her back pain which also causes bad constipation issues for her

## 2024-10-04 NOTE — ED PROVIDER NOTES
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   Diagnostic considerations include vertebral fracture, rib fractures, ICH, cervical spine fracture, hepatic encephalopathy.     6:23 AM  Vital signs reviewed.  Patient presents with low back pain, frequent falls.  History of Hyatt cirrhosis on lactulose.  The patient's daughter is at bedside. Per the daughter, the patient has been more confused over the last 2-3 days. No BM x 4 days. S/p multiple falls over the last few days with the most recent fall this AM. The patient expresses lower back pain--recent hx of L1 fracture. Following with neurosurgery. Will obtain labs, imaging.     7:08 AM  Laboratory workup significant for acute kidney injury, hypokalemia likely due to decreased oral intake.  The patient's daughter states that her mother typically drinks soda.  Elevated ammonia level as well which may explain the transient encephalopathy. Will replace electrolytes, hydrate with IVF bolus.     8:31 AM  CT imaging without acute findings. GCS 15. [facility] contacted and accepts admission.         Problems Addressed:  KISHORE (acute kidney injury) (HCC): acute illness or injury  Hepatic encephalopathy (HCC): acute illness or injury  Hypokalemia: acute illness or injury  Increased ammonia level: acute illness or injury  Liver cirrhosis secondary to HYATT (HCC): chronic illness or injury with exacerbation, progression, or side effects of treatment    Amount and/or Complexity of Data Reviewed  Labs: ordered.     Details: KISHORE, electrolyte abnormalities, elevated ammonia  Radiology: ordered.     Details: CT imaging without acute findings.  Stable  "findings.  Discussion of management or test interpretation with external provider(s): The case and treatment plan were discussed with hospital medicine.    Risk  Prescription drug management.  Parenteral controlled substances.  Decision regarding hospitalization.      Medications - No data to display    ED Risk Strat Scores           SBIRT 22yo+      Flowsheet Row Most Recent Value   Initial Alcohol Screen: US AUDIT-C     1. How often do you have a drink containing alcohol? 0 Filed at: 10/04/2024 0619   2. How many drinks containing alcohol do you have on a typical day you are drinking?  0 Filed at: 10/04/2024 0619   3b. FEMALE Any Age, or MALE 65+: How often do you have 4 or more drinks on one occassion? 0 Filed at: 10/04/2024 0619   Audit-C Score 0 Filed at: 10/04/2024 0619   BERNIE: How many times in the past year have you...    Used an illegal drug or used a prescription medication for non-medical reasons? Never Filed at: 10/04/2024 0619          History of Present Illness       Chief Complaint   Patient presents with    Fall     Fell this morning around 2 going to the bathroom. She fell a few weeks ago and \"broke her back.\" She said that she has a problem lately with falling all the times and she doesn't know why.       Past Medical History:   Diagnosis Date    Back pain     Carcinoid tumor     neuroendocrine    CHF (congestive heart failure) (HCC)     Cirrhosis of liver (HCC)       History reviewed. No pertinent surgical history.   Family History   Problem Relation Age of Onset    Hypertension Father       Social History     Tobacco Use    Smoking status: Never    Smokeless tobacco: Never   Vaping Use    Vaping status: Never Used   Substance Use Topics    Alcohol use: Never    Drug use: Never      E-Cigarette/Vaping    E-Cigarette Use Never User       E-Cigarette/Vaping Substances      I have reviewed and agree with the history as documented.       History provided by:  Patient, medical records and " relative  Fall  Mechanism of injury comment:  S/p fall from standing when ambulating to the bathroom.  Injury location:  Torso and foot  Torso injury location:  Back  Foot injury location:  R heel  Incident location:  Bathroom  Time since incident:  4 hours  Prior to arrival data:     Loss of consciousness: no      Amnesic to event: no    Associated symptoms: back pain    Associated symptoms: no abdominal pain, no chest pain, no difficulty breathing, no headaches, no loss of consciousness, no nausea, no neck pain and no vomiting      Review of Systems   Constitutional:  Negative for activity change, appetite change and fatigue.   Respiratory:  Negative for cough, chest tightness and shortness of breath.    Cardiovascular:  Negative for chest pain and palpitations.   Gastrointestinal:  Positive for constipation. Negative for abdominal pain, diarrhea, nausea and vomiting.   Musculoskeletal:  Positive for arthralgias, back pain and gait problem. Negative for neck pain.   Skin:  Positive for color change and wound. Negative for pallor and rash.   Neurological:  Negative for dizziness, loss of consciousness, syncope, weakness, light-headedness and headaches.   Hematological:  Bruises/bleeds easily.   Psychiatric/Behavioral:  Positive for confusion and decreased concentration.    All other systems reviewed and are negative.    Objective       ED Triage Vitals [10/04/24 0616]   Temperature Pulse BP Respirations SpO2 Patient Position - Orthostatic VS   98.3 °F (36.8 °C) 90 -- 18 98 % --      Temp Source Heart Rate Source BP Location FiO2 (%) Pain Score    Temporal Monitor -- -- 8      Vitals      Date and Time Temp Pulse SpO2 Resp BP Pain Score FACES Pain Rating User   10/04/24 0616 98.3 °F (36.8 °C) 90 98 % 18 -- 8 -- AR          Physical Exam  Vitals and nursing note reviewed. Exam conducted with a chaperone present.   Constitutional:       General: She is not in acute distress.     Appearance: She is not ill-appearing or  toxic-appearing.   HENT:      Head: Normocephalic and atraumatic.      Right Ear: External ear normal.      Left Ear: External ear normal.   Eyes:      General: No scleral icterus.     Extraocular Movements: Extraocular movements intact.      Conjunctiva/sclera: Conjunctivae normal.   Cardiovascular:      Rate and Rhythm: Normal rate and regular rhythm.      Pulses: Normal pulses.      Heart sounds: Murmur heard.   Pulmonary:      Effort: Pulmonary effort is normal. No respiratory distress.      Breath sounds: Normal breath sounds. No wheezing or rhonchi.   Chest:      Chest wall: No tenderness.   Abdominal:      General: Bowel sounds are normal.      Palpations: Abdomen is soft.      Tenderness: There is no abdominal tenderness. There is no right CVA tenderness, left CVA tenderness, guarding or rebound.   Musculoskeletal:         General: Tenderness and signs of injury present.      Cervical back: Neck supple. No tenderness.      Right lower leg: No edema.      Left lower leg: No edema.      Comments: TTP along the midline lumbar/sacral spine. No stepoffs.   Laceration along the medial aspect of the right calcaneous.    Skin:     General: Skin is warm and dry.      Findings: Bruising present.      Comments: Scattered ecchymosis along the lower extremities.    Neurological:      General: No focal deficit present.      Mental Status: She is alert. She is disoriented.   Psychiatric:         Mood and Affect: Mood normal.         Behavior: Behavior normal.       Results Reviewed       Procedure Component Value Units Date/Time    Urine Microscopic [[id number]]  (Normal) Collected: 10/04/24 0756    Lab Status: Final result Specimen: Urine, Clean Catch Updated: 10/04/24 0851     RBC, UA 0-1 /hpf      WBC, UA None Seen /hpf      Epithelial Cells Occasional /hpf      Bacteria, UA Occasional /hpf     UA w Reflex to Microscopic w Reflex to Culture [[id number]]  (Abnormal) Collected: 10/04/24 0756    Lab Status: Final result  Specimen: Urine, Clean Catch Updated: 10/04/24 0826     Color, UA Yellow     Clarity, UA Clear     Specific Gravity, UA <=1.005     pH, UA 6.5     Leukocytes, UA Negative     Nitrite, UA Negative     Protein, UA Negative mg/dl      Glucose, UA Negative mg/dl      Ketones, UA Negative mg/dl      Urobilinogen, UA 0.2 E.U./dl      Bilirubin, UA Negative     Occult Blood, UA Trace-Intact    CBC and differential [[id number]]  (Abnormal) Collected: 10/04/24 0636    Lab Status: Final result Specimen: Blood from Arm, Right Updated: 10/04/24 0744     WBC 2.78 Thousand/uL      RBC 3.49 Million/uL      Hemoglobin 10.8 g/dL      Hematocrit 33.3 %      MCV 95 fL      MCH 30.9 pg      MCHC 32.4 g/dL      RDW 14.7 %      MPV 9.6 fL      Platelets 68 Thousands/uL      nRBC 0 /100 WBCs      Segmented % 70 %      Immature Grans % 0 %      Lymphocytes % 20 %      Monocytes % 6 %      Eosinophils Relative 3 %      Basophils Relative 1 %      Absolute Neutrophils 1.92 Thousands/µL      Absolute Immature Grans 0.01 Thousand/uL      Absolute Lymphocytes 0.56 Thousands/µL      Absolute Monocytes 0.17 Thousand/µL      Eosinophils Absolute 0.08 Thousand/µL      Basophils Absolute 0.04 Thousands/µL     Magnesium [[id number]]  (Normal) Collected: 10/04/24 0636    Lab Status: Final result Specimen: Blood from Arm, Right Updated: 10/04/24 0725     Magnesium 2.0 mg/dL     Protime-INR [[id number]]  (Abnormal) Collected: 10/04/24 0636    Lab Status: Final result Specimen: Blood from Arm, Right Updated: 10/04/24 0717     Protime 15.5 seconds      INR 1.19    Narrative:      INR Therapeutic Range    Indication                                             INR Range      Atrial Fibrillation                                               2.0-3.0  Hypercoagulable State                                    2.0.2.3  Left Ventricular Asist Device                            2.0-3.0  Mechanical Heart Valve                                  -    Aortic(with afib, MI,  embolism, HF, LA enlargement,    and/or coagulopathy)                                     2.0-3.0 (2.5-3.5)     Mitral                                                             2.5-3.5  Prosthetic/Bioprosthetic Heart Valve               2.0-3.0  Venous thromboembolism (VTE: VT, PE        2.0-3.0    Ammonia [[id number]]  (Abnormal) Collected: 10/04/24 0636    Lab Status: Final result Specimen: Blood from Arm, Right Updated: 10/04/24 0704     Ammonia 116 umol/L     Basic metabolic panel [[id number]]  (Abnormal) Collected: 10/04/24 0636    Lab Status: Final result Specimen: Blood from Arm, Right Updated: 10/04/24 0703     Sodium 138 mmol/L      Potassium 2.9 mmol/L      Chloride 94 mmol/L      CO2 37 mmol/L      ANION GAP 7 mmol/L      BUN 30 mg/dL      Creatinine 1.66 mg/dL      Glucose 122 mg/dL      Calcium 8.5 mg/dL      eGFR 34 ml/min/1.73sq m     Narrative:      National Kidney Disease Foundation guidelines for Chronic Kidney Disease (CKD):     Stage 1 with normal or high GFR (GFR > 90 mL/min/1.73 square meters)    Stage 2 Mild CKD (GFR = 60-89 mL/min/1.73 square meters)    Stage 3A Moderate CKD (GFR = 45-59 mL/min/1.73 square meters)    Stage 3B Moderate CKD (GFR = 30-44 mL/min/1.73 square meters)    Stage 4 Severe CKD (GFR = 15-29 mL/min/1.73 square meters)    Stage 5 End Stage CKD (GFR <15 mL/min/1.73 square meters)  Note: GFR calculation is accurate only with a steady state creatinine    Hepatic function panel [[id number]]  (Abnormal) Collected: 10/04/24 0636    Lab Status: Final result Specimen: Blood from Arm, Right Updated: 10/04/24 0703     Total Bilirubin 1.31 mg/dL      Bilirubin, Direct 0.38 mg/dL      Alkaline Phosphatase 213 U/L      AST 47 U/L      ALT 22 U/L      Total Protein 7.3 g/dL      Albumin 3.2 g/dL     Lipase [[id number]]  (Abnormal) Collected: 10/04/24 0636    Lab Status: Final result Specimen: Blood from Arm, Right Updated: 10/04/24 0703     Lipase 10 u/L     Lactic acid, plasma (w/reflex  if result > 2.0) [[id number]]  (Normal) Collected: 10/04/24 0636    Lab Status: Final result Specimen: Blood from Arm, Right Updated: 10/04/24 0702     LACTIC ACID 1.8 mmol/L     Narrative:      Result may be elevated if tourniquet was used during collection.    Blood gas, venous [[id number]]  (Abnormal) Collected: 10/04/24 0636    Lab Status: Final result Specimen: Blood from Arm, Right Updated: 10/04/24 0645     pH, Lake 7.435     pCO2, [place] 55.7 mm Hg      pO2, [place] 24.7 mm Hg      HCO3, [place] 36.6 mmol/L      Base Excess, [place] 10.5 mmol/L      O2 Content, [place] 7.4 ml/dL      O2 HGB, VENOUS 43.2 %           CT head without contrast   Final Interpretation by Raffaele Barry MD (10/04 0738)      No acute intracranial abnormality.      No cervical spine fracture or traumatic malalignment.            Workstation performed: [id number]         CT spine cervical without contrast   Final Interpretation by Raffaele Barry MD (10/04 0738)      No acute intracranial abnormality.      No cervical spine fracture or traumatic malalignment.            Workstation performed: [id number]         CT chest abdomen pelvis w contrast   Final Interpretation by Raffaele Barry MD (10/04 0755)      Similar focal groundglass attenuation in the left upper lobe at site of prior pneumonia. Recommend 6-month chest CT to confirm resolution.      Cirrhosis with sequela of portal hypertension. No ascites.      Unchanged subacute/chronic L1 superior endplate compression fracture. No acute fracture.            Workstation performed: [id number]         CT recon only lumbar spine   Final Interpretation by Raffaele Barry MD (10/04 0755)      Similar focal groundglass attenuation in the left upper lobe at site of prior pneumonia. Recommend 6-month chest CT to confirm resolution.      Cirrhosis with sequela of portal hypertension. No ascites.      Unchanged subacute/chronic L1 superior endplate compression fracture. No  acute fracture.            Workstation performed: [id number]           Procedures    ED Medication and Procedure Management   Prior to Admission Medications   Prescriptions Last Dose Informant Patient Reported? Taking?   Cholecalciferol (Vitamin D-3) 125 MCG (5000 UT) TABS   Yes No   Sig: Take 1 tablet by mouth once a week Tuesday   amitriptyline (ELAVIL) 75 mg tablet   No No   Sig: Take 1 tablet (75 mg total) by mouth daily at bedtime   folic acid (FOLVITE) 1 mg tablet   Yes No   Sig: Take 1 mg by mouth daily   gabapentin (NEURONTIN) 100 mg capsule   No No   Sig: Take 2 capsules (200 mg total) by mouth 3 (three) times a day   hydroxychloroquine (PLAQUENIL) 200 mg tablet   Yes No   Sig: Take 200 mg by mouth 2 (two) times a day with meals   lactulose (CHRONULAC) 10 g/15 mL solution   No No   Sig: Take 30 mL (20 g total) by mouth 4 (four) times a day Goal bowel movements is 4 a day   lidocaine (LIDODERM) 5 %   No No   Sig: Apply 1 patch topically over 12 hours daily Remove & Discard patch within 12 hours or as directed by MD   oxyCODONE (ROXICODONE) 10 MG TABS   No No   Sig: Take 1 tablet (10 mg total) by mouth every 6 (six) hours as needed for moderate pain Max Daily Amount: 40 mg   rifaximin (XIFAXAN) 550 mg tablet   No No   Sig: Take 1 tablet (550 mg total) by mouth every 12 (twelve) hours   sertraline (ZOLOFT) 100 mg tablet   No No   Sig: Take 1 tablet (100 mg total) by mouth daily   spironolactone (ALDACTONE) 50 mg tablet   No No   Sig: Take 1 tablet (50 mg total) by mouth daily   torsemide (DEMADEX) 20 mg tablet   No No   Sig: Take 1 tablet (20 mg total) by mouth 2 (two) times a day before meals      Facility-Administered Medications: None     Patient's Medications   Discharge Prescriptions    No medications on file     No discharge procedures on file.  ED SEPSIS DOCUMENTATION            Cheng Ivy,   10/04/24 0936       Cheng Ivy,   10/04/24 0937

## 2024-10-04 NOTE — CASE MANAGEMENT
Case Management Assessment & Discharge Planning Note    Patient name Bailey Olsen  Location /-01 MRN [id number]  : 1970 Date 10/4/2024       Current Admission Date: 10/4/2024  Current Admission Diagnosis:Fall   Patient Active Problem List    Diagnosis Date Noted Date Diagnosed    KISHORE (acute kidney injury) (HCC) 10/04/2024     Morbid obesity with BMI of 40.0-44.9, adult (HCC) 10/04/2024     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) 2024     Fall from standing 2024     Closed compression fracture of L1 lumbar vertebra, sequela 2024     Closed stable burst fracture of first lumbar vertebra with routine healing 2024     Groin hematoma 2024     Ambulatory dysfunction 2024     Intractable back pain 2024     Hyperammonemia (HCC) 2024     Hepatic encephalopathy (HCC) 2024     Fall 2024     Other cirrhosis of liver (HCC) 2024     History of malignant carcinoid tumor of small intestine 2024     Class 3 severe obesity due to excess calories in adult (HCC) 2024     Primary hypertension 2024     Anxiety 2024     Hypokalemia 2024     Fibromyalgia 2024     Iron deficiency anemia 2024     Pancytopenia (HCC) 2024     Sarcoidosis 2024     Bilateral pulmonary infiltrates 2024       LOS (days): 0  Geometric Mean LOS (GMLOS) (days): 3.3  Days to GMLOS:3.1     OBJECTIVE:  PATIENT READMITTED TO HOSPITAL  Risk of Unplanned Readmission Score: 32.54         Current admission status: Inpatient  Referral Reason: Other (readmision, PT/OT pnd)    Preferred Pharmacy:   [facility] [address] [place] [address] [place] - [address]  [address] [facility]   Phone: [phone] Fax: [phone]    [id number] - [place] - [address]   Phone: [phone] Fax:  "[phone]    Primary Care Provider: Nayely Brown DO    Primary Insurance: MEDICARE  Secondary Insurance: [facility]    ASSESSMENT:  Active Health Care Proxies       Cristian Olsen Health Care Representative - Spouse   Primary Phone: [phone] (Mobile)                 Advance Directives  Does patient have a Health Care POA?: No  Was patient offered paperwork?: Yes (Given)  Does patient currently have a Health Care decision maker?: Yes, please see Health Care Proxy section  Does patient have Advance Directives?: No  Was patient offered paperwork?: Yes (Given)  Primary Contact: Fidelina (Sister)         Readmission Root Cause  30 Day Readmission: Yes  During your hospital stay, did someone (provider, nurse, ) explain your care to you in a way you could understand?: No  Did you feel medically stable to leave the hospital?: Yes  Were you able to pay for your medication at the pharmacy?: Yes  Did you have reliable transportation to take you to your appointments?: Yes  During previous admission, was a post-acute recommendation made?: Yes  What post-acute resources were offered?: HHC, OP Therapy, Offered, but declined  Patient was readmitted due to: pain, \"feeling out of control\", anemia, back pain, fall 2x this week  Action Plan: pt/ot assessments, neurosurgery, Pain control ,lso brace    Patient Information  Admitted from:: Home  Mental Status: Alert  During Assessment patient was accompanied by: Not accompanied during assessment  Assessment information provided by:: Patient  Primary Caregiver: Self  Support Systems: Spouse/significant other, Daughter, Family members  [place] Residence: [facility]  What city do you live in?: [place]  Home entry access options. Select all that apply.: Stairs  Number of steps to enter home.: 1  Do the steps have railings?: Yes  Type of Current Residence: 2 story home  Upon entering residence, is there a bedroom on the main floor (no further steps)?: " Yes  Living Arrangements: Lives w/ Spouse/significant other, Lives w/ Daughter  Is patient a ?: No    Activities of Daily Living Prior to Admission  Functional Status: Independent  Completes ADLs independently?: Yes  Ambulates independently?: Yes  Does patient use assisted devices?: Yes  Assisted Devices (DME) used: Walker  Does patient currently own DME?: Yes  What DME does the patient currently own?: Walker  Does patient have a history of Outpatient Therapy (PT/OT)?: No  Does the patient have a history of Short-Term Rehab?: Yes ([facility] [place])  Does patient have a history of HHC?: Yes ([facility])  Does patient currently have HHC?: No         Patient Information Continued  Income Source: SSI/SSD  Does patient have prescription coverage?: Yes  Does patient receive dialysis treatments?: No  Does patient have a history of substance abuse?: No  Does patient have a history of Mental Health Diagnosis?: Yes (depression)  Is patient receiving treatment for mental health?: Yes (med mgt PCP)  Has patient received inpatient treatment related to mental health in the last 2 years?: No         Means of Transportation  Means of Transport to [facility]:: Family transport      Social Determinants of Health (SDOH)      Flowsheet Row Most Recent Value   Housing Stability    In the last 12 months, was there a time when you were not able to pay the mortgage or rent on time? N   In the past 12 months, how many times have you moved where you were living? 0   At any time in the past 12 months, were you homeless or living in a shelter (including now)? N   Transportation Needs    In the past 12 months, has lack of transportation kept you from medical appointments or from getting medications? no   In the past 12 months, has lack of transportation kept you from meetings, work, or from getting things needed for daily living? No   Food Insecurity    Within the past 12 months, you worried that your food would run out before you got  "the money to buy more. Never true   Within the past 12 months, the food you bought just didn't last and you didn't have money to get more. Never true   Utilities    In the past 12 months has the electric, gas, oil, or water company threatened to shut off services in your home? No            DISCHARGE DETAILS:    Discharge planning discussed with:: Patient  Freedom of Choice: Yes  Comments - Freedom of Choice: Not want STR or HHC  CM contacted family/caregiver?: No- see comments (declined)  Were Treatment Team discharge recommendations reviewed with patient/caregiver?: Yes  Did patient/caregiver verbalize understanding of patient care needs?: Yes  Were patient/caregiver advised of the risks associated with not following Treatment Team discharge recommendations?: Yes         CM met with patient at the bedside,baseline information  was obtained. CM discussed the role of CM in helping the patient develop a discharge plan and assist the patient in carry out their plan.  Patient reported 2 falls this week and feeling \"out of control\" with their pain.    CM discussed patient's discharge plan - at this time patient does not want HHC or STR if recommended by pending therapy assessments.   Patient daughter is available for transportation home when medically cleared.      CM to follow patient's care and discharge needs.   "

## 2024-10-04 NOTE — CONSULTS
Consultation - [facility]  Bailey Olsen 53 y.o. female MRN: [id number]  Unit/Bed#: -01 Encounter: [id number]      ASSESSMENT & PLAN  HYATT cirrhosis  HE  Small EVs  Elevated LFTs  MELD-Na MELD 3.0: 16 at 10/4/2024  6:36 AM, Child Wilder class B  Ascites  No prior paracentesis, CT 7/30/24 without evidence of ascites, no ascites appreciated on exam  Continue aldactone 50mg daily and torsemide 20mg BID  Low Na diet, 2g/day  Hepatic encephalopathy  Ammonia 116 on admission. No role for trending ammonia levels  Continue lactulose, continue BID xifaxan  Titrate lactulose to 2-3 soft to loose BMs daily once she starts having BMs  Encouraged importance of compliance with medication at home   Avoid narcotics when able to prevent worsening of constipation and mental status  Esophageal varices  EGD 3/2024 with grade 1 EV - recall 1 year  Due 3/2025  HCC screening  UTD  CT 10/4/24 with no liver masses  Repeat US in q6 months    Constipation  Lactulose as above. Miralax can be added as needed if symptoms are not controlled with 30 mg 4x daily.        Reason for Consult / Principal Problem:     HPI: Bailey Olsen is a 53 y.o. 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 She was recently admitted 9/8-11 for similar issues was found to have HE. She has a known compression Fx of L2 from her falls. She was treated with PO and rectal lactulose and PO xifaxan during that admission and mental status improved with BMs. She reports 2 falls at home in the last week leading to this admission. She is not established with a new hepatologist since her prior one in [place] retired.     She reports periods of constipation at home lasting 3-4 days despite taking lactulose daily. Last BM 9/30 per pt. She reports  taking about 3-4 tablespoons of lactulose daily on average at home. She uses miralax prn for constipation as well but still has intermittent issues with constipation. She does not take miralax daily at home. She has confusion, poor focus, and sleep disturbances at home frequently. Mental status worsens at home when constipated. She is also taking the xifaxan BID at home. She is unable to recall if she falls due to the confusion/disorientation. She is on opiates intermittently at home for pain which worsen her constipation. She reports that she is compliant with her medications at home.     She was given a lactulose enema earlier today with a large soft brown BM documented in the chart. She is on q2h lactulose currently. She reports some improvement in her mental symptoms so far.     Denies alcohol use  Denies new supplements  Denies IVDU    Last EGD 3/19/24 - grade 1 esophageal varices, repeat EGD 1 year.   Last Colonoscopy 3/19/24 - 1 polyp + internal hemorrhoids, repeat 2034.       Endoscopic risk assessment:  Anticoagulation use:n  Diabetes medication:n  CVS history:CHF  Abdominal surgeries: CCY, gastric bypass, hernia repair.   Sleep apnea:n  O2 use:n    Past Medical History:   Diagnosis Date    Back pain     Carcinoid tumor     neuroendocrine    CHF (congestive heart failure) (HCC)     Cirrhosis of liver (HCC)     Sarcoidosis      Past Surgical History:   Procedure Laterality Date    CHOLECYSTECTOMY      GASTRIC BYPASS      HERNIA REPAIR       Social History   Social History     Substance and Sexual Activity   Alcohol Use Never     Social History     Substance and Sexual Activity   Drug Use Never     Social History     Tobacco Use   Smoking Status Never   Smokeless Tobacco Never       Family History   Problem Relation Age of Onset    Hypertension Father          Medications Prior to Admission:     amitriptyline (ELAVIL) 75 mg tablet    Cholecalciferol (Vitamin D-3) 125 MCG (5000 UT) TABS    folic acid (FOLVITE)  1 mg tablet    gabapentin (NEURONTIN) 100 mg capsule    hydroxychloroquine (PLAQUENIL) 200 mg tablet    lactulose (CHRONULAC) 10 g/15 mL solution    lidocaine (LIDODERM) 5 %    oxyCODONE (ROXICODONE) 10 MG TABS    rifaximin (XIFAXAN) 550 mg tablet    sertraline (ZOLOFT) 100 mg tablet    spironolactone (ALDACTONE) 50 mg tablet    torsemide (DEMADEX) 20 mg tablet    Current Facility-Administered Medications:     acetaminophen (TYLENOL) tablet 650 mg, Q6H PRN    folic acid (FOLVITE) tablet 1 mg, Daily    gabapentin (NEURONTIN) capsule 200 mg, TID    lactulose (CHRONULAC) oral solution 30 g, Q2H    rifaximin (XIFAXAN) tablet 550 mg, Q12H MIGUEL    sertraline (ZOLOFT) tablet 100 mg, Daily    sodium chloride 0.9 % infusion, Continuous, Last Rate: 100 mL/hr (10/04/24 1051)  Allergies   Allergen Reactions    Zolpidem GI Intolerance, Other (See Comments) and Hives     Other Reaction(s): Other      Blacks out    Blacks out    Tapentadol Sneezing and Other (See Comments)     Zoned out    Alprazolam Other (See Comments)     Other Reaction(s): Other      Feels zoned out    Feels zoned out    Amoxicillin-Pot Clavulanate Hives and Nausea Only     Amoxil fine    Morphine Nausea Only and Headache    Tramadol GI Intolerance    Azelastine Rash    Azelastine Hcl Rash    Molds & Smuts Rash     Other Reaction(s): Unknown       Physical Exam  Constitutional:       General: She is not in acute distress.  HENT:      Head: Normocephalic and atraumatic.   Eyes:      Conjunctiva/sclera: Conjunctivae normal.   Pulmonary:      Effort: Pulmonary effort is normal.   Abdominal:      General: Abdomen is flat. There is no distension.      Palpations: Abdomen is soft.      Tenderness: There is no abdominal tenderness. There is no guarding.   Skin:     General: Skin is warm and dry.      Coloration: Skin is not jaundiced.   Neurological:      Mental Status: She is alert and oriented to person, place, and time.   Psychiatric:         Mood and Affect: Mood  "normal.         Behavior: Behavior normal.       Most Recent Vital Signs:  Vitals:    10/04/24 0645 10/04/24 0730 10/04/24 0908 10/04/24 0909   BP: 99/52   104/53   Pulse: 82  83 83   Resp: 18   18   Temp:    98.7 °F (37.1 °C)   TempSrc:    Temporal   SpO2: 97% 94% 94% 94%   Weight:    118 kg (260 lb)   Height:    5' 5\" (1.651 m)       Intake/Output Summary (Last 24 hours) at 10/4/2024 1112  Last data filed at 10/4/2024 1018  Gross per 24 hour   Intake --   Output 1000 ml   Net -1000 ml       LABS/IMAGING  Lab Results: I have reviewed all relevant lab results during this hospitalization.    Imaging Studies:  I have reviewed all the relevant images during this hospitalizations    Counseling / Coordination of Care  Total time spent today 30 minutes. Greater than 50% of total time was spent with the patient and / or family counseling and / or coordination of care.    Rad Manzano PA-C    "

## 2024-10-04 NOTE — ASSESSMENT & PLAN NOTE
Potassium 2.9 on presentation.will give replacement orally and recheck bmp.   Magnesium is normal

## 2024-10-04 NOTE — H&P
H&P - Hospitalist   Name: Bailey Olsen 53 y.o. female I MRN: [id number]  Unit/Bed#: -01 I Date of Admission: 10/4/2024   Date of Service: 10/4/2024 I Hospital Day: 0     Assessment & Plan  Fall  Patient presented with a fall last night at home.  She has issues of L1 fracture in the past and chronic back pain issues.  Now again here with physical deconditioning and fall.  Consult pt/ot and neurosurgery  Pain control ,lso brace    Hypokalemia  Potassium 2.9 on presentation.will give replacement orally and recheck bmp.   Magnesium is normal    Hepatic encephalopathy (HCC)  Patient found to have hepatic encephalopathy.  Level elevated to 119.  Patient states that she ran out of her lactulose yesterday.  She also states that she has not had a bowel movement in over 3 days despite taking MiraLAX as well.  Will give lactulose retention enema followed by oral lactulose 30 g every 2 hours until patient has at least 3-4 soft bowel movements.  Recheck ammonia level in AM.  Is also on chronic narcotic therapy for her back pain which also causes bad constipation issues for her  KISHORE (acute kidney injury) (HCC)  Creatinine is 1-1.2 currently creatinine is elevated to 1.6.  Will place on gentle hydration and monitor for now hold diuretics  Morbid obesity with BMI of 40.0-44.9, adult (HCC)  Bmi -43.27.      VTE Pharmacologic Prophylaxis: VTE Score: 3 Moderate Risk (Score 3-4) - Pharmacological DVT Prophylaxis Contraindicated. Sequential Compression Devices Ordered.  Code Status: Level 1 - Full Code as per patient  Discussion with family: Updated  () via phone.    Anticipated Length of Stay: Patient will be admitted on an inpatient basis with an anticipated length of stay of greater than 2 midnights secondary to fall.    History of Present Illness   Chief Complaint: Fall initial encounter    Bailey Olsen is a 53 y.o. female with a PMH of liver cirrhosis who presents with fall at home.  Patient states  that she ran out of her lactulose yesterday and has not had a bowel movement for over 3 days despite taking lactulose previously at home and MiraLAX not feeling well trying to get confused again fell at 2 AM this morning and was brought to the hospital by family.  Also complaining of increasing back pain.    Review of Systems   Constitutional:  Positive for appetite change and fatigue. Negative for chills and fever.   HENT:  Negative for hearing loss, sore throat and trouble swallowing.    Eyes:  Negative for photophobia, discharge and visual disturbance.   Respiratory:  Negative for chest tightness and shortness of breath.    Cardiovascular:  Negative for chest pain and palpitations.   Gastrointestinal:  Negative for abdominal pain, blood in stool and vomiting.   Endocrine: Negative for polydipsia and polyuria.   Genitourinary:  Negative for difficulty urinating, dysuria, flank pain and hematuria.   Musculoskeletal:  Positive for back pain. Negative for gait problem.   Skin:  Negative for rash.   Allergic/Immunologic: Negative for environmental allergies and food allergies.   Neurological:  Negative for dizziness, seizures, syncope and headaches.   Hematological:  Does not bruise/bleed easily.   Psychiatric/Behavioral:  Positive for decreased concentration. Negative for behavioral problems.    All other systems reviewed and are negative.      Historical Information   Past Medical History:   Diagnosis Date    Back pain     Carcinoid tumor     neuroendocrine    CHF (congestive heart failure) (HCC)     Cirrhosis of liver (HCC)     Sarcoidosis      Past Surgical History:   Procedure Laterality Date    CHOLECYSTECTOMY      GASTRIC BYPASS      HERNIA REPAIR       Social History     Tobacco Use    Smoking status: Never    Smokeless tobacco: Never   Vaping Use    Vaping status: Never Used   Substance and Sexual Activity    Alcohol use: Never    Drug use: Never    Sexual activity: Not Currently     E-Cigarette/Vaping     E-Cigarette Use Never User      E-Cigarette/Vaping Substances     Family History   Problem Relation Age of Onset    Hypertension Father      Social History:  Marital Status: /Civil Union     Objective :  Temp:  [98.3 °F (36.8 °C)-98.7 °F (37.1 °C)] 98.7 °F (37.1 °C)  HR:  [82-90] 83  BP: ()/(52-53) 104/53  Resp:  [18] 18  SpO2:  [94 %-98 %] 94 %  O2 Device: None (Room air)    Physical Exam  Vitals and nursing note reviewed.   Constitutional:       Appearance: She is ill-appearing.   HENT:      Head: Normocephalic and atraumatic.      Right Ear: External ear normal.      Left Ear: External ear normal.      Nose: Nose normal.      Mouth/Throat:      Pharynx: Oropharynx is clear.   Eyes:      Pupils: Pupils are equal, round, and reactive to light.   Cardiovascular:      Rate and Rhythm: Normal rate and regular rhythm.      Heart sounds: Normal heart sounds.   Pulmonary:      Effort: Pulmonary effort is normal.      Breath sounds: Normal breath sounds.   Abdominal:      General: Bowel sounds are normal.      Palpations: Abdomen is soft.      Tenderness: There is no abdominal tenderness.   Musculoskeletal:         General: Tenderness present. Normal range of motion.      Cervical back: Normal range of motion and neck supple.      Comments: tenderness in the mid to lower back.   Skin:     General: Skin is warm and dry.      Capillary Refill: Capillary refill takes less than 2 seconds.   Neurological:      General: No focal deficit present.      Mental Status: She is alert.      Comments: Ranted to person and place and slight confusion noted when answering questions with timelines   Psychiatric:         Mood and Affect: Mood normal.         Lines/Drains:            Lab Results: I have reviewed the following results:  Results from last 7 days   Lab Units 10/04/24  0636   WBC Thousand/uL 2.78*   HEMOGLOBIN g/dL 10.8*   HEMATOCRIT % 33.3*   PLATELETS Thousands/uL 68*   SEGS PCT % 70   LYMPHO PCT % 20   MONO PCT %  6   EOS PCT % 3     Results from last 7 days   Lab Units 10/04/24  0636   SODIUM mmol/L 138   POTASSIUM mmol/L 2.9*   CHLORIDE mmol/L 94*   CO2 mmol/L 37*   BUN mg/dL 30*   CREATININE mg/dL 1.66*   ANION GAP mmol/L 7   CALCIUM mg/dL 8.5   ALBUMIN g/dL 3.2*   TOTAL BILIRUBIN mg/dL 1.31*   ALK PHOS U/L 213*   ALT U/L 22   AST U/L 47*   GLUCOSE RANDOM mg/dL 122     Results from last 7 days   Lab Units 10/04/24  0636   INR  1.19         Lab Results   Component Value Date    HGBA1C 5.9 (H) 02/27/2024    HGBA1C 5.9 08/03/2023     Results from last 7 days   Lab Units 10/04/24  0636   LACTIC ACID mmol/L 1.8     Reviewed CT chest abdomen pelvis and lumbar spine    Other Study Results Review: EKG was reviewed.     Administrative Statements       ** Please Note: This note has been constructed using a voice recognition system. **

## 2024-10-04 NOTE — ASSESSMENT & PLAN NOTE
Creatinine is 1-1.2 currently creatinine is elevated to 1.6.  Will place on gentle hydration and monitor for now hold diuretics

## 2024-10-05 LAB
ALBUMIN SERPL BCG-MCNC: 3 G/DL (ref 3.5–5)
ALP SERPL-CCNC: 211 U/L (ref 34–104)
ALT SERPL W P-5'-P-CCNC: 19 U/L (ref 7–52)
AMMONIA PLAS-SCNC: 75 UMOL/L (ref 18–72)
ANION GAP SERPL CALCULATED.3IONS-SCNC: 5 MMOL/L (ref 4–13)
AST SERPL W P-5'-P-CCNC: 46 U/L (ref 13–39)
BILIRUB SERPL-MCNC: 1.04 MG/DL (ref 0.2–1)
BUN SERPL-MCNC: 22 MG/DL (ref 5–25)
CALCIUM ALBUM COR SERPL-MCNC: 9.3 MG/DL (ref 8.3–10.1)
CALCIUM SERPL-MCNC: 8.5 MG/DL (ref 8.4–10.2)
CHLORIDE SERPL-SCNC: 102 MMOL/L (ref 96–108)
CO2 SERPL-SCNC: 34 MMOL/L (ref 21–32)
CREAT SERPL-MCNC: 0.87 MG/DL (ref 0.6–1.3)
ERYTHROCYTE [DISTWIDTH] IN BLOOD BY AUTOMATED COUNT: 15.3 % (ref 11.6–15.1)
GFR SERPL CREATININE-BSD FRML MDRD: 75 ML/MIN/1.73SQ M
GLUCOSE SERPL-MCNC: 94 MG/DL (ref 65–140)
HCT VFR BLD AUTO: 34.5 % (ref 34.8–46.1)
HGB BLD-MCNC: 10.7 G/DL (ref 11.5–15.4)
MAGNESIUM SERPL-MCNC: 2.2 MG/DL (ref 1.9–2.7)
MCH RBC QN AUTO: 30.9 PG (ref 26.8–34.3)
MCHC RBC AUTO-ENTMCNC: 31 G/DL (ref 31.4–37.4)
MCV RBC AUTO: 100 FL (ref 82–98)
PLATELET # BLD AUTO: 58 THOUSANDS/UL (ref 149–390)
PMV BLD AUTO: 10.1 FL (ref 8.9–12.7)
POTASSIUM SERPL-SCNC: 3.5 MMOL/L (ref 3.5–5.3)
PROT SERPL-MCNC: 7 G/DL (ref 6.4–8.4)
RBC # BLD AUTO: 3.46 MILLION/UL (ref 3.81–5.12)
SODIUM SERPL-SCNC: 141 MMOL/L (ref 135–147)
TSH SERPL DL<=0.05 MIU/L-ACNC: 1.92 UIU/ML (ref 0.45–4.5)
WBC # BLD AUTO: 2.18 THOUSAND/UL (ref 4.31–10.16)

## 2024-10-05 PROCEDURE — 84443 ASSAY THYROID STIM HORMONE: CPT | Performed by: FAMILY MEDICINE

## 2024-10-05 PROCEDURE — 97163 PT EVAL HIGH COMPLEX 45 MIN: CPT

## 2024-10-05 PROCEDURE — 82140 ASSAY OF AMMONIA: CPT | Performed by: FAMILY MEDICINE

## 2024-10-05 PROCEDURE — 83735 ASSAY OF MAGNESIUM: CPT | Performed by: FAMILY MEDICINE

## 2024-10-05 PROCEDURE — 99232 SBSQ HOSP IP/OBS MODERATE 35: CPT | Performed by: FAMILY MEDICINE

## 2024-10-05 PROCEDURE — 80053 COMPREHEN METABOLIC PANEL: CPT | Performed by: FAMILY MEDICINE

## 2024-10-05 PROCEDURE — 85027 COMPLETE CBC AUTOMATED: CPT | Performed by: FAMILY MEDICINE

## 2024-10-05 RX ORDER — TORSEMIDE 20 MG/1
20 TABLET ORAL DAILY
Status: DISCONTINUED | OUTPATIENT
Start: 2024-10-05 | End: 2024-10-06 | Stop reason: HOSPADM

## 2024-10-05 RX ORDER — OXYCODONE HYDROCHLORIDE 5 MG/1
5 TABLET ORAL EVERY 6 HOURS PRN
Status: DISCONTINUED | OUTPATIENT
Start: 2024-10-05 | End: 2024-10-06 | Stop reason: HOSPADM

## 2024-10-05 RX ORDER — OXYCODONE HYDROCHLORIDE 5 MG/1
7.5 TABLET ORAL EVERY 6 HOURS PRN
Status: DISCONTINUED | OUTPATIENT
Start: 2024-10-05 | End: 2024-10-05

## 2024-10-05 RX ORDER — LACTULOSE 10 G/15ML
30 SOLUTION ORAL 4 TIMES DAILY
Status: DISCONTINUED | OUTPATIENT
Start: 2024-10-05 | End: 2024-10-06 | Stop reason: HOSPADM

## 2024-10-05 RX ORDER — SPIRONOLACTONE 25 MG/1
25 TABLET ORAL DAILY
Status: DISCONTINUED | OUTPATIENT
Start: 2024-10-05 | End: 2024-10-06 | Stop reason: HOSPADM

## 2024-10-05 RX ORDER — OXYCODONE HYDROCHLORIDE 10 MG/1
10 TABLET ORAL EVERY 6 HOURS PRN
Status: DISCONTINUED | OUTPATIENT
Start: 2024-10-05 | End: 2024-10-06 | Stop reason: HOSPADM

## 2024-10-05 RX ADMIN — LACTULOSE 30 G: 20 SOLUTION ORAL at 16:51

## 2024-10-05 RX ADMIN — GABAPENTIN 200 MG: 100 CAPSULE ORAL at 20:01

## 2024-10-05 RX ADMIN — RIFAXIMIN 550 MG: 550 TABLET ORAL at 20:01

## 2024-10-05 RX ADMIN — LACTULOSE 30 G: 20 SOLUTION ORAL at 21:27

## 2024-10-05 RX ADMIN — FOLIC ACID 1 MG: 1 TABLET ORAL at 08:24

## 2024-10-05 RX ADMIN — LACTULOSE 30 G: 20 SOLUTION ORAL at 08:24

## 2024-10-05 RX ADMIN — SPIRONOLACTONE 25 MG: 25 TABLET ORAL at 10:15

## 2024-10-05 RX ADMIN — TORSEMIDE 20 MG: 20 TABLET ORAL at 10:14

## 2024-10-05 RX ADMIN — GABAPENTIN 200 MG: 100 CAPSULE ORAL at 08:24

## 2024-10-05 RX ADMIN — NYSTATIN: 100000 POWDER TOPICAL at 08:27

## 2024-10-05 RX ADMIN — ACETAMINOPHEN 325MG 650 MG: 325 TABLET ORAL at 20:01

## 2024-10-05 RX ADMIN — LACTULOSE 30 G: 20 SOLUTION ORAL at 11:56

## 2024-10-05 RX ADMIN — SODIUM CHLORIDE 50 ML/HR: 0.9 INJECTION, SOLUTION INTRAVENOUS at 05:19

## 2024-10-05 RX ADMIN — OXYCODONE HYDROCHLORIDE 10 MG: 10 TABLET ORAL at 23:16

## 2024-10-05 RX ADMIN — OXYCODONE HYDROCHLORIDE 10 MG: 10 TABLET ORAL at 10:14

## 2024-10-05 RX ADMIN — OXYCODONE HYDROCHLORIDE 5 MG: 5 TABLET ORAL at 05:19

## 2024-10-05 RX ADMIN — SERTRALINE 100 MG: 100 TABLET, FILM COATED ORAL at 08:24

## 2024-10-05 RX ADMIN — RIFAXIMIN 550 MG: 550 TABLET ORAL at 08:24

## 2024-10-05 RX ADMIN — OXYCODONE HYDROCHLORIDE 10 MG: 10 TABLET ORAL at 16:51

## 2024-10-05 RX ADMIN — GABAPENTIN 200 MG: 100 CAPSULE ORAL at 16:51

## 2024-10-05 NOTE — ASSESSMENT & PLAN NOTE
Patient found to have hepatic encephalopathy. ammonia Level elevated to 119.  Patient states that she ran out of her lactulose yesterday.  She also states that she has not had a bowel movement in over 3 days despite taking MiraLAX as well.  received lactulose retention enema followed by oral lactulose 30 g every 2 hours until patient had at least 3 soft bowel movements.  Recheck ammonia level   Change lactulose to 30 g 4 times daily today and continue Xifaxan  Is also on chronic narcotic therapy for her back pain which also causes bad constipation issues for her

## 2024-10-05 NOTE — PHYSICAL THERAPY NOTE
"   PHYSICAL THERAPY EVALUATION  NAME:  Bailey Olsen  DATE: 10/05/24    AGE:   53 y.o.  Mrn:   [id number]  ADMIT DX:  Hepatic encephalopathy (HCC) [K76.82]  Hypokalemia [E87.6]  Back pain [M54.9]  KISHORE (acute kidney injury) (HCC) [N17.9]  Increased ammonia level [R79.89]  Liver cirrhosis secondary to HYATT (HCC) [K75.81, K74.60]    Past Medical History:   Diagnosis Date    Back pain     Carcinoid tumor     neuroendocrine    CHF (congestive heart failure) (HCC)     Cirrhosis of liver (HCC)     Sarcoidosis      Length Of Stay: 1  Performed at least 2 patient identifiers during session: Name and Birthday  PHYSICAL THERAPY EVALUATION :    10/05/24 1350   PT Last Visit   PT Visit Date 10/05/24   Note Type   Note type Evaluation   Pain Assessment   Pain Assessment Tool 0-10   Pain Score 7   Pain Location/Orientation Orientation: Lower;Location: Back   Restrictions/Precautions   Braces or Orthoses LSO  (Patient presents with a TLSO brace from a prior admission (~ 2 months) and states \" I like that one, it's broken in and its my diane\".)   Other Precautions Bed Alarm;Chair Alarm;Fall Risk;Pain;Multiple lines   Home Living   Type of Home House  (1 FABRICIO)   Home Layout Two level;Performs ADLs on one level;Able to live on main level with bedroom/bathroom  (Pt lives in a 2  with 1 FABRICIO , no HR.  Once inside, pt utilizes a first floor set-up with full bathroom.)   Bathroom Shower/Tub Tub/shower unit   Bathroom Toilet Standard   Bathroom Equipment Grab bars around toilet;Shower chair;Grab bars in shower   Home Equipment Walker   Additional Comments Pt lives in a 2  with 1 FABRICIO , no HR. Once inside, pt utilizes a first floor set-up with full bathroom.  She owns a walker which she uses \"sometimes\" at baseline.   Prior Function   Level of [place] Independent with ADLs;Independent with functional mobility;Independent with IADLS   Lives With Spouse;Daughter   Receives Help From Family   IADLs Independent with meal " "prep;Family/Friend/Other provides medication management;Family/Friend/Other provides transportation   Falls in the last 6 months 5 to 10   Vocational On disability   Comments Pt lives with her spouse and dtg.  He  works so patient is home alone during the day.  Pt reports being MI with use of Rw for household mobility and ADL's PTA.  She was MI with some light IADL's but spouse A.  Pt has a significant fall history with limited insight and questionable complaince.   General   Additional Pertinent History Pt follows with neurosurgery for pathological fx of vertebrae with upcoming L1 kyphoplasty with biopsy.  Pt has been utilizing TLSO for ~ 2 months, per her reports.   Family/Caregiver Present No   Cognition   Overall Cognitive Status Impaired   Arousal/Participation Cooperative   Orientation Level Oriented X4   Following Commands Follows one step commands with increased time or repetition   Comments Limited insight, decreased safety, judgerment and questionable compliance   Subjective   Subjective \"I'm really shaky today\"   RLE Assessment   RLE Assessment WFL  (Grossly 3+/5)   LLE Assessment   LLE Assessment WFL  (Grossly 3+/5)   Bed Mobility   Additional Comments Patient seated on EOB with nurse at start of session.  D/T same, bed mobility N/T.   Transfers   Sit to Stand 4  Minimal assistance   Additional items Assist x 1;Increased time required;Verbal cues;Impulsive   Stand to Sit 4  Minimal assistance   Additional items Assist x 1;Impulsive;Verbal cues   Stand pivot   (steadying A)   Additional items Assist x 1;Increased time required;Verbal cues   Additional Comments Patient completed STS at min A x 1 with increased time to process and sig. VC's for hand placement, use of AD, sequencing and safety.  Patient noted to be impulsive overall with decreased insight/judgement.  Min A also intermittently required for controlled descent to seated.  TLSO brace in place t/o transfers.   Ambulation/Elevation   Gait " "pattern Improper Weight shift;Wide DENICE;Decreased foot clearance;Short stride;Decreased heel strike   Gait Assistance   (steadying  a)   Additional items Assist x 1;Verbal cues   Assistive Device Rolling walker  (tlso)   Distance Patient ambulated 200' with RW and TLSO brace in place @ steadying A x 1.  Pt required VC's for proper use of AD including postioning and hand placement and was noted to have occ posterior lean.  Poor overall safety noted.  Increased shaking at times.  Pt educated on recommendation to utilize Rw at all times as she admitted to only using it \"sometimes\" at home.   Balance   Static Sitting Good   Dynamic Sitting Fair +   Static Standing Fair   Dynamic Standing Fair -   Ambulatory Fair -   Endurance Deficit   Endurance Deficit Yes   Endurance Deficit Description pt mildly SOB post 200' of gt.  Vitals WNL's on RA   Activity Tolerance   Activity Tolerance Patient limited by fatigue;Patient limited by pain   Nurse Made Aware Spoke with RN Johanne   Assessment   Prognosis Fair   Problem List Decreased strength;Decreased endurance;Impaired balance;Decreased mobility;Decreased cognition;Impaired judgement;Decreased safety awareness;Obesity;Orthopedic restrictions;Pain   Barriers to Discharge Decreased caregiver support;Inaccessible home environment   Barriers to Discharge Comments Patient lis home alone while her spouse works, FABRICIO, A  for all mobility, high fall risk   Goals   Patient Goals \"to go home\"   STG Expiration Date 10/19/24   Plan   Treatment/Interventions Functional transfer training;ADL retraining;LE strengthening/ROM;Elevations;Therapeutic exercise;Endurance training;Cognitive reorientation;Patient/family training;Equipment eval/education;Bed mobility;Gait training;Compensatory technique education;Spoke to nursing   PT Frequency 3-5x/wk   Discharge Recommendation   Rehab Resource Intensity Level, PT II (Moderate Resource Intensity)   Equipment Recommended Walker  (pt owns)   Additional " Comments Patient may benefit from PAR as she is currently requiring A x 1 for all mobility, demonstrates decreased insight/judgement and is home alone during the day.  Should this not be a viable option, would recommend HHPT with consistent use of Ad and 24/7 S from family   AM-PAC Basic Mobility Inpatient   Turning in Flat Bed Without Bedrails 3   Lying on Back to Sitting on Edge of Flat Bed Without Bedrails 3   Moving Bed to Chair 3   Standing Up From Chair Using Arms 3   Walk in Room 3   Climb 3-5 Stairs With Railing 2   Basic Mobility Inpatient Raw Score 17   Basic Mobility Standardized Score 39.67   [facility] Highest Level Of Mobility   -HL Goal 5: Stand one or more mins   -HL Achieved 7: Walk 25 feet or more   End of Consult   Patient Position at End of Consult Bedside chair;Bed/Chair alarm activated;All needs within reach     (Please find full objective findings from PT assessment regarding body systems outlined above).     Assessment: Pt is a 53 y.o. female seen for PT evaluation s/p admission to [facility] on 10/4/2024 with Fall.  Order placed for PT services.  Upon evaluation: Pt is presenting with impaired functional mobility due to pain, decreased strength, decreased endurance, impaired balance, gait deviations, impaired cognition, decreased safety awareness, impaired judgment, orthopedic restrictions, and fall risk requiring  min assistance for transfers and steadying assistance for ambulation with Rw . Pt's clinical presentation is currently unstable/unpredictable given the functional mobility deficits above, especially weakness, decreased endurance, impaired balance, gait deviations, pain, decreased activity tolerance, decreased functional mobility tolerance, decreased safety awareness, impaired judgement, decreased cognition, and SOB upon exertion, coupled with fall risks as indicated by AM-PAC 6-Clicks: 17/24 as well as hx of falls, impulsivity, impaired balance,  polypharmacy, impaired judgement, decreased safety awareness, decreased cognition, and obesity and combined with medical complications of pain impacting overall mobility status, abnormal renal lab values, abnormal H&H, abnormal WBCs, abnormal CBC, abnormal potassium values, abnormal CO2 values, readmission to hospital, multiple readmissions, impulsivity during admission, need for input for mobility technique/safety, and abnormal amonia .  Pt's PMHx and comorbidities that may affect physical performance and progress include: CHF, obesity, limited cognition, cancer history and/or treatment, and back pain, carcinoid tumor, cirrhosis of the liver, sarcoidosis . Personal factors affecting pt at time of IE include: hx of non-compliance, questionable non-compliance, unable to perform caregiver support/tasks, impulsivity, step(s) to enter environment, availability as recommended, past experience, behavioral pattern, inability to perform IADLs, inability to perform ADLs, inability to navigate level surfaces without external assistance, inability to navigate community distances, limited insight into impairments, and recent fall(s)/fall history. Pt will benefit from continued skilled PT services to address deficits as defined above and to maximize level of functional mobility to facilitate return toward PLOF and improved QOL. From PT/mobility standpoint, recommendation at time of d/c would be Level II (Moderate Resource Intensity and with walker pending progress in order to reduce fall risk and maximize pt's functional independence and consistency with mobility in order to facilitate return to PLOF.  Recommend trial with walker next 1-2 sessions and ther ex next 1-2 sessions.     The patient's AM-[facility] Basic Mobility Inpatient Short Form Raw Score is 17. A Raw score of less than or equal to 16 suggests the patient may benefit from discharge to post-acute rehabilitation services. Despite [facility], patient may benefit from PAR as she  is currently requiring A x 1 for all mobility, demonstrates decreased insight/judgement,is home alone during the day and is at high fall risk. Should this not be a viable option, would recommend HHPT with consistent use of Ad and 24/7 S from family. Please also refer to the recommendation of the Physical Therapist for safe discharge planning.       Goals: Pt will: Perform bed mobility tasks with Supervision to reposition in bed and prepare for transfers. Pt will perform transfers with Supervision to improve ease of transfers and improve ease of bed mobility and prepare for ambulation. Pt will ambulate with LRAD for >/= 300 with  Supervision  to increase Indep in home environment, decrease risk for falls, improve activity tolerance, and demonstrate compliance w/ brace and to access home environment. Pt will complete >/= 3 steps with with unilateral handrail with no more than min A to return to home with FABRICIO. Pt will participate in objective balance assessment to determine baseline fall risk. Pt will increase B LE strength >/= 1/2 MMT grade to facilitate functional mobility.     Rebecca Camacho, PT,MSPT

## 2024-10-05 NOTE — PROGRESS NOTES
Progress Note - Hospitalist   Name: Bailey Olsen 53 y.o. female I MRN: [id number]  Unit/Bed#: [facility]-01 I Date of Admission: 10/4/2024   Date of Service: 10/5/2024 I Hospital Day: 1    Assessment & Plan  Fall  Patient presented with a fall last night at home.  She has issues of L1 fracture in the past and chronic back pain issues.  Now again here with physical deconditioning and fall.  Consult pt/ot and scheduled for outpatient kyphoplasty on 9/15.  Pain control ,lso brace    Hypokalemia  Potassium 2.9 on presentation.will give replacement orally and recheck bmp.   Magnesium is normal  potassium improved to 3.5    Hepatic encephalopathy (HCC)  Patient found to have hepatic encephalopathy. ammonia Level elevated to 119.  Patient states that she ran out of her lactulose yesterday.  She also states that she has not had a bowel movement in over 3 days despite taking MiraLAX as well.  received lactulose retention enema followed by oral lactulose 30 g every 2 hours until patient had at least 3 soft bowel movements.  Recheck ammonia level   Change lactulose to 30 g 4 times daily today and continue Xifaxan  Is also on chronic narcotic therapy for her back pain which also causes bad constipation issues for her  KISHORE (acute kidney injury) ([facility])  Creatinine is 1-1.2 currently creatinine is elevated to 1.6.  Will place on gentle hydration and monitor for now hold diuretics  Renal Function has now improved and back to baseline.  Will resume diuretics and stop IV fluids.  She normally takes torsemide 20 mg twice daily and aldactone 50 mg daily at home . will restart torsemide 20 mg once daily and aldactone 25 mg daily.observe for now  Morbid obesity with BMI of 40.0-44.9, adult ([facility])  Bmi -43.27.    VTE Pharmacologic Prophylaxis: VTE Score: 3 Moderate Risk (Score 3-4) - Pharmacological DVT Prophylaxis Contraindicated. Sequential Compression Devices Ordered.    Mobility:   Basic Mobility Inpatient Raw Score: 17  -[facility] Goal: 5:  Stand one or more mins  JH-HLM Achieved: 6: Walk 10 steps or more  JH-HLM Goal achieved. Continue to encourage appropriate mobility.    Patient Centered Rounds: I performed bedside rounds with nursing staff today.   Discussions with Specialists or Other Care Team Provider: none    Education and Discussions with Family / Patient: Updated  () at bedside.    Current Length of Stay: 1 day(s)  Current Patient Status: Inpatient   Certification Statement: The patient will continue to require additional inpatient hospital stay due to falls and acute hepatic encephalopathy  Discharge Plan: Anticipate discharge in 24-48 hrs to discharge location to be determined pending rehab evaluations.    Code Status: Level 1 - Full Code    Subjective   Patient denies any chest pain or shortness of breath but does complain of back pain.  She states that she had 3 large bowel movements    Objective :  Temp:  [97 °F (36.1 °C)-97.5 °F (36.4 °C)] 97.5 °F (36.4 °C)  HR:  [84-86] 84  BP: (104-110)/(60-65) 110/65  Resp:  [16] 16  SpO2:  [90 %-93 %] 93 %  O2 Device: None (Room air)    Body mass index is 43.27 kg/m².     Input and Output Summary (last 24 hours):     Intake/Output Summary (Last 24 hours) at 10/5/2024 0950  Last data filed at 10/5/2024 0600  Gross per 24 hour   Intake 2870.84 ml   Output 2200 ml   Net 670.84 ml       Physical Exam  Vitals and nursing note reviewed.   Constitutional:       Appearance: Normal appearance. She is obese.   HENT:      Head: Normocephalic and atraumatic.      Right Ear: External ear normal.      Left Ear: External ear normal.      Nose: Nose normal.      Mouth/Throat:      Pharynx: Oropharynx is clear.   Eyes:      Pupils: Pupils are equal, round, and reactive to light.   Cardiovascular:      Rate and Rhythm: Normal rate and regular rhythm.      Heart sounds: Normal heart sounds.   Pulmonary:      Effort: Pulmonary effort is normal.      Breath sounds: Normal breath sounds.   Abdominal:       General: Bowel sounds are normal.      Palpations: Abdomen is soft.      Tenderness: There is no abdominal tenderness.   Musculoskeletal:         General: Tenderness present.      Cervical back: Normal range of motion and neck supple.      Comments: trace edema bilateral lower extremity   Skin:     General: Skin is warm and dry.      Capillary Refill: Capillary refill takes less than 2 seconds.   Neurological:      General: No focal deficit present.      Mental Status: She is alert and oriented to person, place, and time.   Psychiatric:         Mood and Affect: Mood normal.           Lines/Drains:              Lab Results: I have reviewed the following results:   Results from last 7 days   Lab Units 10/05/24  0514 10/04/24  0636   WBC Thousand/uL 2.18* 2.78*   HEMOGLOBIN g/dL 10.7* 10.8*   HEMATOCRIT % 34.5* 33.3*   PLATELETS Thousands/uL 58* 68*   SEGS PCT %  --  70   LYMPHO PCT %  --  20   MONO PCT %  --  6   EOS PCT %  --  3     Results from last 7 days   Lab Units 10/05/24  0514   SODIUM mmol/L 141   POTASSIUM mmol/L 3.5   CHLORIDE mmol/L 102   CO2 mmol/L 34*   BUN mg/dL 22   CREATININE mg/dL 0.87   ANION GAP mmol/L 5   CALCIUM mg/dL 8.5   ALBUMIN g/dL 3.0*   TOTAL BILIRUBIN mg/dL 1.04*   ALK PHOS U/L 211*   ALT U/L 19   AST U/L 46*   GLUCOSE RANDOM mg/dL 94     Results from last 7 days   Lab Units 10/04/24  0636   INR  1.19             Results from last 7 days   Lab Units 10/04/24  0636   LACTIC ACID mmol/L 1.8       Recent Cultures (last 7 days):         CT abdomen pelvis and CT chest and CT lumbar spine  Other Study Results Review: EKG was reviewed.     Last 24 Hours Medication List:     Current Facility-Administered Medications:     acetaminophen (TYLENOL) tablet 650 mg, Q6H PRN    folic acid (FOLVITE) tablet 1 mg, Daily    gabapentin (NEURONTIN) capsule 200 mg, TID    lactulose (CHRONULAC) oral solution 30 g, 4x Daily    nystatin (MYCOSTATIN) powder, BID    oxyCODONE (ROXICODONE) IR tablet 2.5 mg, Q6H  PRN    oxyCODONE (ROXICODONE) IR tablet 5 mg, Q6H PRN    rifaximin (XIFAXAN) tablet 550 mg, Q12H MIGUEL    sertraline (ZOLOFT) tablet 100 mg, Daily    spironolactone (ALDACTONE) tablet 25 mg, Daily    torsemide (DEMADEX) tablet 20 mg, Daily    Administrative Statements   Today, Patient Was Seen By: Idalia Stuart MD      **Please Note: This note may have been constructed using a voice recognition system.**

## 2024-10-05 NOTE — PLAN OF CARE
Problem: PHYSICAL THERAPY ADULT  Goal: Performs mobility at highest level of function for planned discharge setting.  See evaluation for individualized goals.  Description: Treatment/Interventions: Functional transfer training, ADL retraining, LE strengthening/ROM, Elevations, Therapeutic exercise, Endurance training, Cognitive reorientation, Patient/family training, Equipment eval/education, Bed mobility, Gait training, Compensatory technique education, Spoke to nursing  Equipment Recommended: Walker (pt owns)       See flowsheet documentation for full assessment, interventions and recommendations.  Note: Prognosis: Fair  Problem List: Decreased strength, Decreased endurance, Impaired balance, Decreased mobility, Decreased cognition, Impaired judgement, Decreased safety awareness, Obesity, Orthopedic restrictions, Pain  Assessment: Pt is a 53 y.o. femaleseen for PT evaluation s/p admission to [facility] on 10/4/2024 with Fall.  Order placed for PT services.  Upon evaluation: Pt is presenting with impaired functional mobility due to pain, decreased strength, decreased endurance, impaired balance, gait deviations, impaired cognition, decreased safety awareness, impaired judgment, orthopedic restrictions, and fall risk requiring  min assistance for transfers and steadying assistance for ambulation with Rw . Pt's clinical presentation is currently unstable/unpredictable given the functional mobility deficits above, especially weakness, decreased endurance, impaired balance, gait deviations, pain, decreased activity tolerance, decreased functional mobility tolerance, decreased safety awareness, impaired judgement, decreased cognition, and SOB upon exertion, coupled with fall risks as indicated by AM-PAC 6-Clicks: 17/24 as well as hx of falls, impulsivity, impaired balance, polypharmacy, impaired judgement, decreased safety awareness, decreased cognition, and obesity and combined with medical  complications of pain impacting overall mobility status, abnormal renal lab values, abnormal H&H, abnormal WBCs, abnormal CBC, abnormal potassium values, abnormal CO2 values, readmission to hospital, multiple readmissions, impulsivity during admission, need for input for mobility technique/safety, and abnormal amonia .  Pt's PMHx and comorbidities that may affect physical performance and progress include: CHF, obesity, limited cognition, cancer history and/or treatment, and back pain, carcinoid tumor, cirrhosis of the liver, sarcoidosis . Personal factors affecting pt at time of IE include: hx of non-compliance, questionable non-compliance, unable to perform caregiver support/tasks, impulsivity, step(s) to enter environment, availability as recommended, past experience, behavioral pattern, inability to perform IADLs, inability to perform ADLs, inability to navigate level surfaces without external assistance, inability to navigate community distances, limited insight into impairments, and recent fall(s)/fall history. Pt will benefit from continued skilled PT services to address deficits as defined above and to maximize level of functional mobility to facilitate return toward PLOF and improved QOL. From PT/mobility standpoint, recommendation at time of d/c would be Level II (Moderate Resource Intensity and with walker pending progress in order to reduce fall risk and maximize pt's fu1  Barriers to Discharge: Decreased caregiver support, Inaccessible home environment  Barriers to Discharge Comments: Patient lis home alone while her spouse works, FABRICIO, A  for all mobility, high fall risk  Rehab Resource Intensity Level, PT: II (Moderate Resource Intensity)    See flowsheet documentation for full assessment.

## 2024-10-05 NOTE — ASSESSMENT & PLAN NOTE
Creatinine is 1-1.2 currently creatinine is elevated to 1.6.  Will place on gentle hydration and monitor for now hold diuretics  Renal Function has now improved and back to baseline.  Will resume diuretics and stop IV fluids.  She normally takes torsemide 20 mg twice daily and aldactone 50 mg daily at home . will restart torsemide 20 mg once daily and aldactone 25 mg daily.observe for now

## 2024-10-05 NOTE — ASSESSMENT & PLAN NOTE
Patient presented with a fall last night at home.  She has issues of L1 fracture in the past and chronic back pain issues.  Now again here with physical deconditioning and fall.  Consult pt/ot and scheduled for outpatient kyphoplasty on 9/15.  Pain control ,lso brace

## 2024-10-05 NOTE — PLAN OF CARE
Problem: PAIN - ADULT  Goal: Verbalizes/displays adequate comfort level or baseline comfort level  Description: Interventions:  - Encourage patient to monitor pain and request assistance  - Assess pain using appropriate pain scale0-10  - Administer analgesics based on type and severity of pain and evaluate response  - Implement non-pharmacological measures as appropriate and evaluate response  - Consider cultural and social influences on pain and pain management  - Notify physician/advanced practitioner if interventions unsuccessful or patient reports new pain  Outcome: Progressing     Problem: INFECTION - ADULT  Goal: Absence or prevention of progression during hospitalization  Description: INTERVENTIONS:  - Assess and monitor for signs and symptoms of infection  - Monitor lab/diagnostic results  - Monitor all insertion sites, i.e. indwelling lines, tubes, and drains  - Monitor endotracheal if appropriate and nasal secretions for changes in amount and color  - [place] appropriate cooling/warming therapies per order  - Administer medications as ordered  - Instruct and encourage patient and family to use good hand hygiene technique  - Identify and instruct in appropriate isolation precautions for identified infection/condition  Outcome: Progressing  Goal: Absence of fever/infection during neutropenic period  Description: INTERVENTIONS:  - Monitor WBC    Outcome: Progressing     Problem: SAFETY ADULT  Goal: Patient will remain free of falls  Description: INTERVENTIONS:  - Educate patient/family on patient safety including physical limitations  - Instruct patient to call for assistance with activity   - Consult OT/PT to assist with strengthening/mobility   - Keep Call bell within reach  - Keep bed low and locked with side rails adjusted as appropriate  - Keep care items and personal belongings within reach  - Initiate and maintain comfort rounds  - Make Fall Risk Sign visible to staff  - Offer Toileting every 2  Hours, in advance of need  - Initiate/Maintain bed alarm  - Apply yellow socks and bracelet for high fall risk patients  - Consider moving patient to room near nurses station  Outcome: Progressing  Goal: Maintain or return to baseline ADL function  Description: INTERVENTIONS:  -  Assess patient's ability to carry out ADLs; assess patient's baseline for ADL function and identify physical deficits which impact ability to perform ADLs (bathing, care of mouth/teeth, toileting, grooming, dressing, etc.)  - Assess/evaluate cause of self-care deficits   - Assess range of motion  - Assess patient's mobility; develop plan if impaired  - Assess patient's need for assistive devices and provide as appropriate  - Encourage maximum independence but intervene and supervise when necessary  - Involve family in performance of ADLs  - Assess for home care needs following discharge   - Consider OT consult to assist with ADL evaluation and planning for discharge  - Provide patient education as appropriate  Outcome: Progressing  Goal: Maintains/Returns to pre admission functional level  Description: INTERVENTIONS:  - Perform AM-PAC 6 Click Basic Mobility/ Daily Activity assessment daily.  - Set and communicate daily mobility goal to care team and patient/family/caregiver.   - Collaborate with rehabilitation services on mobility goals if consulted  - Perform Range of Motion 4 times a day.  - Reposition patient every 2 hours.  - Dangle patient 2 times a day  - Stand patient 2 times a day  - Ambulate patient 2 times a day  - Out of bed to chair 2 times a day   - Out of bed for meals 2 times a day  - Out of bed for toileting  - Record patient progress and toleration of activity level   Outcome: Progressing     Problem: DISCHARGE PLANNING  Goal: Discharge to home or other facility with appropriate resources  Description: INTERVENTIONS:  - Identify barriers to discharge w/patient and caregiver  - Arrange for needed discharge resources and  transportation as appropriate  - Identify discharge learning needs (meds, wound care, etc.)  - Arrange for interpretive services to assist at discharge as needed  - Refer to Case Management Department for coordinating discharge planning if the patient needs post-hospital services based on physician/advanced practitioner order or complex needs related to functional status, cognitive ability, or social support system  Outcome: Progressing     Problem: Knowledge Deficit  Goal: Patient/family/caregiver demonstrates understanding of disease process, treatment plan, medications, and discharge instructions  Description: Complete learning assessment and assess knowledge base.  Interventions:  - Provide teaching at level of understanding  - Provide teaching via preferred learning methods  Outcome: Progressing     Problem: Prexisting or High Potential for Compromised Skin Integrity  Goal: Skin integrity is maintained or improved  Description: INTERVENTIONS:  - Identify patients at risk for skin breakdown  - Assess and monitor skin integrity  - Assess and monitor nutrition and hydration status  - Monitor labs   - Assess for incontinence   - Turn and reposition patient  - Assist with mobility/ambulation  - Relieve pressure over bony prominences  - Avoid friction and shearing  - Provide appropriate hygiene as needed including keeping skin clean and dry  - Evaluate need for skin moisturizer/barrier cream  - Collaborate with interdisciplinary team   - Patient/family teaching  - Consider wound care consult   Outcome: Progressing

## 2024-10-05 NOTE — ASSESSMENT & PLAN NOTE
Potassium 2.9 on presentation.will give replacement orally and recheck bmp.   Magnesium is normal  potassium improved to 3.5

## 2024-10-05 NOTE — UTILIZATION REVIEW
"Initial Clinical Review    Admission: Date/Time/Statement:   Admission Orders (From admission, onward)       Ordered        10/04/24 0811  INPATIENT ADMISSION  Once                          Orders Placed This Encounter   Procedures    INPATIENT ADMISSION     Standing Status:   Standing     Number of Occurrences:   1     Order Specific Question:   Level of Care     Answer:   Med Surg [16]     Order Specific Question:   Estimated length of stay     Answer:   More than 2 Midnights     Order Specific Question:   Certification     Answer:   I certify that inpatient services are medically necessary for this patient for a duration of greater than two midnights. See H&P and MD Progress Notes for additional information about the patient's course of treatment.     ED Arrival Information       Expected   -    Arrival   10/4/2024 06:13    Acuity   Urgent              Means of arrival   Wheelchair    Escorted by   Family Member    Service   Hospitalist    Admission type   Emergency              Arrival complaint   fall, back pain, no head strike, no blood thinners             Chief Complaint   Patient presents with    Fall     Fell this morning around 2 going to the bathroom. She fell a few weeks ago and \"broke her back.\" She said that she has a problem lately with falling all the times and she doesn't know why.       Initial Presentation: 53 y.o. female  PMH of liver cirrhosis who presents with fall at home.  Patient states that she ran out of her lactulose yesterday and has not had a bowel movement for over 3 days despite taking lactulose previously at home and MiraLAX not feeling well trying to get confused again fell at 2 AM this morning and was brought to the hospital by family.  Also complaining of increasing back pain. Admitted Ip status for fall , hepatic encephalopathy , hypokalemia , KISHORE Cr 1.6, baseline 1-1.2 . Plan for IVF and monitor , hold diuretics . Ammonia 119 , ran out of lactulose yest . No BM x3 days despite " miralax . Plan for lactulose retention enema and po lactulose . Recheck ammonia in am . PT OT consult , pain control , lso brace .     Anticipated Length of Stay/Certification Statement: Patient will be admitted on an inpatient basis with an anticipated length of stay of greater than 2 midnights secondary to fall.     10/4 GI Consult   MELD-Na MELD 3.0: 16 . Elevated Lfts , cont aldactone and torsemide . Low Na diet . Titrate lactulose to 2-3 soft to loose BMs daily once she starts having BMs     Date: 10/5   Day 2: K improved to 3.5/ mg normal . Change lactulose to 30 g 4 times daily today and continue Xifaxan  Is also on chronic narcotic therapy for her back pain which also causes bad constipation issues for her. Renal function back to baseline . Restart torsemide and aldactone . Had 3 large BMs .     ED Treatment-Medication Administration from 10/04/2024 0609 to 10/04/2024 0907         Date/Time Order Dose Route Action     10/04/2024 0722 multi-electrolyte (ISOLYTE-S PH 7.4) bolus 1,000 mL 1,000 mL Intravenous New Bag     10/04/2024 0716 iohexol (OMNIPAQUE) 350 MG/ML injection (MULTI-DOSE) 100 mL 100 mL Intravenous Given     10/04/2024 0757 potassium chloride (Klor-Con M20) CR tablet 40 mEq 40 mEq Oral Given     10/04/2024 0812 fentaNYL injection 75 mcg 75 mcg Intravenous Given            Scheduled Medications:  folic acid, 1 mg, Oral, Daily  gabapentin, 200 mg, Oral, TID  lactulose, 30 g, Oral, 4x Daily  nystatin, , Topical, BID  rifaximin, 550 mg, Oral, Q12H MIGUEL  sertraline, 100 mg, Oral, Daily  spironolactone, 25 mg, Oral, Daily  torsemide, 20 mg, Oral, Daily      Continuous IV Infusions:     PRN Meds:  acetaminophen, 650 mg, Oral, Q6H PRN  oxyCODONE, 10 mg, Oral, Q6H PRN  oxyCODONE, 5 mg, Oral, Q6H PRN      ED Triage Vitals   Temperature Pulse Respirations Blood Pressure SpO2 Pain Score   10/04/24 0616 10/04/24 0616 10/04/24 0616 10/04/24 0645 10/04/24 0616 10/04/24 0616   98.3 °F (36.8 °C) 90 18 99/52 98 % 8      Weight (last 2 days)       Date/Time Weight    10/04/24 0909 118 (260)    10/04/24 0616 118 (260)            Vital Signs (last 3 days)       Date/Time Temp Pulse Resp BP MAP (mmHg) SpO2 O2 Device Patient Position - Orthostatic VS [place] Coma Scale Score Pain    10/05/24 1014 -- -- -- -- -- -- -- -- -- 7    10/05/24 10:13:32 -- 90 18 119/67 84 97 % None (Room air) Lying -- --    10/05/24 0832 -- -- -- -- -- -- None (Room air) -- 15 No Pain    10/05/24 0814 97.5 °F (36.4 °C) -- 16 110/65 78 -- -- Lying -- --    10/05/24 0519 -- -- -- -- -- -- -- -- -- 7    10/04/24 22:24:10 97 °F (36.1 °C) 84 16 105/62 76 93 % -- -- -- --    10/04/24 2000 -- -- -- -- -- 90 % None (Room air) -- 15 No Pain    10/04/24 1700 -- -- -- -- -- -- None (Room air) -- -- --    10/04/24 1554 -- -- -- -- -- -- -- -- -- 3    10/04/24 1407 -- -- -- -- -- -- -- -- -- 8    10/04/24 14:04:09 97.3 °F (36.3 °C) 86 16 104/60 75 93 % -- -- -- --    10/04/24 0909 98.7 °F (37.1 °C) 83 18 104/53 -- 94 % None (Room air) -- 15 No Pain    10/04/24 09:08:37 -- 83 -- -- -- 94 % -- -- -- --    10/04/24 0812 -- -- -- -- -- -- -- -- -- 8    10/04/24 0730 -- -- -- -- -- 94 % None (Room air) -- 15 --    10/04/24 0645 -- 82 18 99/52 73 97 % None (Room air) -- -- --    10/04/24 0637 -- -- -- -- -- -- -- -- 15 --    10/04/24 0616 98.3 °F (36.8 °C) 90 18 -- -- 98 % None (Room air) -- -- 8              Pertinent Labs/Diagnostic Test Results:   Radiology:  CT head without contrast   Final Interpretation by Raffaele Barry MD (10/04 0738)      No acute intracranial abnormality.      No cervical spine fracture or traumatic malalignment.            Workstation performed: [id number]         CT spine cervical without contrast   Final Interpretation by Raffaele Barry MD (10/04 0738)      No acute intracranial abnormality.      No cervical spine fracture or traumatic malalignment.            Workstation performed: [id number]         CT chest abdomen pelvis  w contrast   Final Interpretation by Raffaele Barry MD (10/04 0755)      Similar focal groundglass attenuation in the left upper lobe at site of prior pneumonia. Recommend 6-month chest CT to confirm resolution.      Cirrhosis with sequela of portal hypertension. No ascites.      Unchanged subacute/chronic L1 superior endplate compression fracture. No acute fracture.            Workstation performed: [id number]         CT recon only lumbar spine   Final Interpretation by Raffaele Barry MD (10/04 0755)      Similar focal groundglass attenuation in the left upper lobe at site of prior pneumonia. Recommend 6-month chest CT to confirm resolution.      Cirrhosis with sequela of portal hypertension. No ascites.      Unchanged subacute/chronic L1 superior endplate compression fracture. No acute fracture.            Workstation performed: [id number]           Cardiology:  No orders to display     GI:  No orders to display           Results from last 7 days   Lab Units 10/05/24  0514 10/04/24  0636   WBC Thousand/uL 2.18* 2.78*   HEMOGLOBIN g/dL 10.7* 10.8*   HEMATOCRIT % 34.5* 33.3*   PLATELETS Thousands/uL 58* 68*   TOTAL NEUT ABS Thousands/µL  --  1.92         Results from last 7 days   Lab Units 10/05/24  0514 10/04/24  1208 10/04/24  0636   SODIUM mmol/L 141 140 138   POTASSIUM mmol/L 3.5 3.5 2.9*   CHLORIDE mmol/L 102 97 94*   CO2 mmol/L 34* 36* 37*   ANION GAP mmol/L 5 7 7   BUN mg/dL 22 27* 30*   CREATININE mg/dL 0.87 1.23 1.66*   EGFR ml/min/1.73sq m 75 49 34   CALCIUM mg/dL 8.5 8.7 8.5   MAGNESIUM mg/dL 2.2  --  2.0     Results from last 7 days   Lab Units 10/05/24  0928 10/05/24  0514 10/04/24  0636   AST U/L  --  46* 47*   ALT U/L  --  19 22   ALK PHOS U/L  --  211* 213*   TOTAL PROTEIN g/dL  --  7.0 7.3   ALBUMIN g/dL  --  3.0* 3.2*   TOTAL BILIRUBIN mg/dL  --  1.04* 1.31*   BILIRUBIN DIRECT mg/dL  --   --  0.38*   AMMONIA umol/L 75*  --  116*         Results from last 7 days   Lab Units  10/05/24  0514 10/04/24  1208 10/04/24  0636   GLUCOSE RANDOM mg/dL 94 109 122         Results from last 7 days   Lab Units 10/04/24  0636   PH RAMYA  7.435*   PCO2 RAMYA mm Hg 55.7*   PO2 RAMYA mm Hg 24.7*   HCO3 RAMYA mmol/L 36.6*   BASE EXC RAMYA mmol/L 10.5   O2 CONTENT RAMYA ml/dL 7.4   O2 HGB, VENOUS % 43.2*       Results from last 7 days   Lab Units 10/04/24  0636   PROTIME seconds 15.5*   INR  1.19     Results from last 7 days   Lab Units 10/05/24  0514   TSH 3RD GENERATON uIU/mL 1.918         Results from last 7 days   Lab Units 10/04/24  0636   LACTIC ACID mmol/L 1.8     Results from last 7 days   Lab Units 10/04/24  0636   LIPASE u/L 10*     Results from last 7 days   Lab Units 10/04/24  0756   CLARITY UA  Clear   COLOR UA  Yellow   SPEC GRAV UA  <=1.005   PH UA  6.5   GLUCOSE UA mg/dl Negative   KETONES UA mg/dl Negative   BLOOD UA  Trace-Intact*   PROTEIN UA mg/dl Negative   NITRITE UA  Negative   BILIRUBIN UA  Negative   UROBILINOGEN UA E.U./dl 0.2   LEUKOCYTES UA  Negative   WBC UA /hpf None Seen   RBC UA /hpf 0-1   BACTERIA UA /hpf Occasional   EPITHELIAL CELLS WET PREP /hpf Occasional       Past Medical History:   Diagnosis Date    Back pain     Carcinoid tumor     neuroendocrine    CHF (congestive heart failure) (HCC)     Cirrhosis of liver (HCC)     Sarcoidosis      Present on Admission:   Hepatic encephalopathy (HCC)   Hypokalemia   Fall      Admitting Diagnosis: Hepatic encephalopathy (HCC) [K76.82]  Hypokalemia [E87.6]  Back pain [M54.9]  KISHORE (acute kidney injury) (HCC) [N17.9]  Increased ammonia level [R79.89]  Liver cirrhosis secondary to HYATT (HCC) [K75.81, K74.60]  Age/Sex: 53 y.o. female    Network Utilization Review Department  ATTENTION: Please call with any questions or concerns to [phone] and carefully listen to the prompts so that you are directed to the right person. All voicemails are confidential.   For Discharge needs, contact Care Management DC Support Team at [phone] opt. 2  Send  all requests for admission clinical reviews, approved or denied determinations and any other requests to dedicated fax number below belonging to the campus where the patient is receiving treatment. List of dedicated fax numbers for the Facilities:  FACILITY NAME UR FAX NUMBER   ADMISSION DENIALS (Administrative/Medical Necessity) [phone]   DISCHARGE SUPPORT TEAM (NETWORK) [phone]   PARENT CHILD HEALTH (Maternity/NICU/Pediatrics) [phone]   [facility] [phone]   [facility] [phone]   [facility] [phone]   [facility] [phone]   [facility] [phone]   [facility] [phone]   [facility] [phone]   [facility] [phone]   [facility] [phone]   [facility] [phone]   [facility] [phone]   [facility] [phone]

## 2024-10-05 NOTE — PLAN OF CARE
Problem: PAIN - ADULT  Goal: Verbalizes/displays adequate comfort level or baseline comfort level  Description: Interventions:  - Encourage patient to monitor pain and request assistance  - Assess pain using appropriate pain scale0-10  - Administer analgesics based on type and severity of pain and evaluate response  - Implement non-pharmacological measures as appropriate and evaluate response  - Consider cultural and social influences on pain and pain management  - Notify physician/advanced practitioner if interventions unsuccessful or patient reports new pain  10/5/2024 0750 by Johanne Santiago RN  Outcome: Progressing  10/5/2024 0749 by Johanne Santiago RN  Outcome: Progressing     Problem: INFECTION - ADULT  Goal: Absence or prevention of progression during hospitalization  Description: INTERVENTIONS:  - Assess and monitor for signs and symptoms of infection  - Monitor lab/diagnostic results  - Monitor all insertion sites, i.e. indwelling lines, tubes, and drains  - Monitor endotracheal if appropriate and nasal secretions for changes in amount and color  - [place] appropriate cooling/warming therapies per order  - Administer medications as ordered  - Instruct and encourage patient and family to use good hand hygiene technique  - Identify and instruct in appropriate isolation precautions for identified infection/condition  10/5/2024 0750 by Johanne Santiago RN  Outcome: Progressing  10/5/2024 0749 by Johanne Santiago RN  Outcome: Progressing  Goal: Absence of fever/infection during neutropenic period  Description: INTERVENTIONS:  - Monitor WBC    10/5/2024 0750 by Johanne Santiago RN  Outcome: Progressing  10/5/2024 0749 by Johanne Santiago RN  Outcome: Progressing     Problem: SAFETY ADULT  Goal: Patient will remain free of falls  Description: INTERVENTIONS:  - Educate patient/family on patient safety including physical limitations  - Instruct patient to call for assistance with activity   - Consult OT/PT to assist with  strengthening/mobility   - Keep Call bell within reach  - Keep bed low and locked with side rails adjusted as appropriate  - Keep care items and personal belongings within reach  - Initiate and maintain comfort rounds  - Make Fall Risk Sign visible to staff  - Offer Toileting every 2 Hours, in advance of need  - Initiate/Maintain bed/chair alarm  - Obtain necessary fall risk management equipment:   - Apply yellow socks and bracelet for high fall risk patients  - Consider moving patient to room near nurses station  10/5/2024 0750 by Johanne Santiago RN  Outcome: Progressing  10/5/2024 0749 by Johanne Santiago RN  Outcome: Progressing  Goal: Maintain or return to baseline ADL function  Description: INTERVENTIONS:  -  Assess patient's ability to carry out ADLs; assess patient's baseline for ADL function and identify physical deficits which impact ability to perform ADLs (bathing, care of mouth/teeth, toileting, grooming, dressing, etc.)  - Assess/evaluate cause of self-care deficits   - Assess range of motion  - Assess patient's mobility; develop plan if impaired  - Assess patient's need for assistive devices and provide as appropriate  - Encourage maximum independence but intervene and supervise when necessary  - Involve family in performance of ADLs  - Assess for home care needs following discharge   - Consider OT consult to assist with ADL evaluation and planning for discharge  - Provide patient education as appropriate  10/5/2024 0750 by Johanne Santiago RN  Outcome: Progressing  10/5/2024 0749 by Johanne Santiago RN  Outcome: Progressing  Goal: Maintains/Returns to pre admission functional level  Description: INTERVENTIONS:  - Perform AM-PAC 6 Click Basic Mobility/ Daily Activity assessment daily.  - Set and communicate daily mobility goal to care team and patient/family/caregiver.   - Collaborate with rehabilitation services on mobility goals if consulted  - Perform Range of Motion 3 times a day.  - Reposition patient every 2  hours.  - Dangle patient 3 times a day  - Stand patient 3 times a day  - Ambulate patient 3 times a day  - Out of bed to chair 3 times a day   - Out of bed for meals 3 times a day  - Out of bed for toileting  - Record patient progress and toleration of activity level   10/5/2024 0750 by Johanne Santiago RN  Outcome: Progressing  10/5/2024 0749 by Johanne Santiago RN  Outcome: Progressing     Problem: DISCHARGE PLANNING  Goal: Discharge to home or other facility with appropriate resources  Description: INTERVENTIONS:  - Identify barriers to discharge w/patient and caregiver  - Arrange for needed discharge resources and transportation as appropriate  - Identify discharge learning needs (meds, wound care, etc.)  - Arrange for interpretive services to assist at discharge as needed  - Refer to Case Management Department for coordinating discharge planning if the patient needs post-hospital services based on physician/advanced practitioner order or complex needs related to functional status, cognitive ability, or social support system  10/5/2024 0750 by Johanne Santiago RN  Outcome: Progressing  10/5/2024 0749 by Johanne Santiago RN  Outcome: Progressing     Problem: Knowledge Deficit  Goal: Patient/family/caregiver demonstrates understanding of disease process, treatment plan, medications, and discharge instructions  Description: Complete learning assessment and assess knowledge base.  Interventions:  - Provide teaching at level of understanding  - Provide teaching via preferred learning methods  10/5/2024 0750 by Johanne Santiago RN  Outcome: Progressing  10/5/2024 0749 by Johanne Santiago RN  Outcome: Progressing     Problem: Prexisting or High Potential for Compromised Skin Integrity  Goal: Skin integrity is maintained or improved  Description: INTERVENTIONS:  - Identify patients at risk for skin breakdown  - Assess and monitor skin integrity  - Assess and monitor nutrition and hydration status  - Monitor labs   - Assess for incontinence   - Turn  and reposition patient  - Assist with mobility/ambulation  - Relieve pressure over bony prominences  - Avoid friction and shearing  - Provide appropriate hygiene as needed including keeping skin clean and dry  - Evaluate need for skin moisturizer/barrier cream  - Collaborate with interdisciplinary team   - Patient/family teaching  - Consider wound care consult   10/5/2024 0750 by Johanne Santiago RN  Outcome: Progressing  10/5/2024 0749 by Johanne Santiago RN  Outcome: Progressing

## 2024-10-06 VITALS
RESPIRATION RATE: 17 BRPM | SYSTOLIC BLOOD PRESSURE: 121 MMHG | OXYGEN SATURATION: 94 % | HEIGHT: 65 IN | WEIGHT: 260 LBS | HEART RATE: 85 BPM | BODY MASS INDEX: 43.32 KG/M2 | TEMPERATURE: 97.3 F | DIASTOLIC BLOOD PRESSURE: 75 MMHG

## 2024-10-06 LAB
AMMONIA PLAS-SCNC: 50 UMOL/L (ref 18–72)
ANION GAP SERPL CALCULATED.3IONS-SCNC: 2 MMOL/L (ref 4–13)
BUN SERPL-MCNC: 15 MG/DL (ref 5–25)
CALCIUM SERPL-MCNC: 8.3 MG/DL (ref 8.4–10.2)
CHLORIDE SERPL-SCNC: 103 MMOL/L (ref 96–108)
CO2 SERPL-SCNC: 34 MMOL/L (ref 21–32)
CREAT SERPL-MCNC: 0.72 MG/DL (ref 0.6–1.3)
GFR SERPL CREATININE-BSD FRML MDRD: 95 ML/MIN/1.73SQ M
GLUCOSE SERPL-MCNC: 86 MG/DL (ref 65–140)
POTASSIUM SERPL-SCNC: 3.5 MMOL/L (ref 3.5–5.3)
SODIUM SERPL-SCNC: 139 MMOL/L (ref 135–147)

## 2024-10-06 PROCEDURE — 82140 ASSAY OF AMMONIA: CPT | Performed by: FAMILY MEDICINE

## 2024-10-06 PROCEDURE — 80048 BASIC METABOLIC PNL TOTAL CA: CPT | Performed by: FAMILY MEDICINE

## 2024-10-06 PROCEDURE — 99239 HOSP IP/OBS DSCHRG MGMT >30: CPT | Performed by: FAMILY MEDICINE

## 2024-10-06 RX ORDER — LACTULOSE 10 G/15ML
20 SOLUTION ORAL 4 TIMES DAILY
Qty: 3600 ML | Refills: 0 | Status: SHIPPED | OUTPATIENT
Start: 2024-10-06 | End: 2024-11-05

## 2024-10-06 RX ORDER — NYSTATIN 100000 [USP'U]/G
POWDER TOPICAL 2 TIMES DAILY
Qty: 30 G | Refills: 0 | Status: SHIPPED | OUTPATIENT
Start: 2024-10-06 | End: 2024-11-05

## 2024-10-06 RX ORDER — ACETAMINOPHEN 325 MG/1
650 TABLET ORAL EVERY 6 HOURS PRN
Start: 2024-10-06

## 2024-10-06 RX ORDER — TORSEMIDE 20 MG/1
20 TABLET ORAL DAILY
Qty: 30 TABLET | Refills: 0 | Status: SHIPPED | OUTPATIENT
Start: 2024-10-06 | End: 2024-11-05

## 2024-10-06 RX ADMIN — GABAPENTIN 200 MG: 100 CAPSULE ORAL at 08:19

## 2024-10-06 RX ADMIN — RIFAXIMIN 550 MG: 550 TABLET ORAL at 08:19

## 2024-10-06 RX ADMIN — TORSEMIDE 20 MG: 20 TABLET ORAL at 08:19

## 2024-10-06 RX ADMIN — LACTULOSE 30 G: 20 SOLUTION ORAL at 08:20

## 2024-10-06 RX ADMIN — OXYCODONE HYDROCHLORIDE 10 MG: 10 TABLET ORAL at 08:31

## 2024-10-06 RX ADMIN — FOLIC ACID 1 MG: 1 TABLET ORAL at 08:19

## 2024-10-06 RX ADMIN — SPIRONOLACTONE 25 MG: 25 TABLET ORAL at 08:19

## 2024-10-06 RX ADMIN — SERTRALINE 100 MG: 100 TABLET, FILM COATED ORAL at 08:19

## 2024-10-06 NOTE — PLAN OF CARE
Problem: PAIN - ADULT  Goal: Verbalizes/displays adequate comfort level or baseline comfort level  Description: Interventions:  - Encourage patient to monitor pain and request assistance  - Assess pain using appropriate pain scale0-10  - Administer analgesics based on type and severity of pain and evaluate response  - Implement non-pharmacological measures as appropriate and evaluate response  - Consider cultural and social influences on pain and pain management  - Notify physician/advanced practitioner if interventions unsuccessful or patient reports new pain  Outcome: Progressing     Problem: INFECTION - ADULT  Goal: Absence or prevention of progression during hospitalization  Description: INTERVENTIONS:  - Assess and monitor for signs and symptoms of infection  - Monitor lab/diagnostic results  - Monitor all insertion sites, i.e. indwelling lines, tubes, and drains  - Monitor endotracheal if appropriate and nasal secretions for changes in amount and color  - [place] appropriate cooling/warming therapies per order  - Administer medications as ordered  - Instruct and encourage patient and family to use good hand hygiene technique  - Identify and instruct in appropriate isolation precautions for identified infection/condition  Outcome: Progressing  Goal: Absence of fever/infection during neutropenic period  Description: INTERVENTIONS:  - Monitor WBC    Outcome: Progressing     Problem: SAFETY ADULT  Goal: Patient will remain free of falls  Description: INTERVENTIONS:  - Educate patient/family on patient safety including physical limitations  - Instruct patient to call for assistance with activity   - Consult OT/PT to assist with strengthening/mobility   - Keep Call bell within reach  - Keep bed low and locked with side rails adjusted as appropriate  - Keep care items and personal belongings within reach  - Initiate and maintain comfort rounds  - Make Fall Risk Sign visible to staff  - Offer Toileting every 2  Hours, in advance of need  - Initiate/Maintain bed/chair alarm  - Obtain necessary fall risk management equipment:   - Apply yellow socks and bracelet for high fall risk patients  - Consider moving patient to room near nurses station  Outcome: Progressing  Goal: Maintain or return to baseline ADL function  Description: INTERVENTIONS:  -  Assess patient's ability to carry out ADLs; assess patient's baseline for ADL function and identify physical deficits which impact ability to perform ADLs (bathing, care of mouth/teeth, toileting, grooming, dressing, etc.)  - Assess/evaluate cause of self-care deficits   - Assess range of motion  - Assess patient's mobility; develop plan if impaired  - Assess patient's need for assistive devices and provide as appropriate  - Encourage maximum independence but intervene and supervise when necessary  - Involve family in performance of ADLs  - Assess for home care needs following discharge   - Consider OT consult to assist with ADL evaluation and planning for discharge  - Provide patient education as appropriate  Outcome: Progressing  Goal: Maintains/Returns to pre admission functional level  Description: INTERVENTIONS:  - Perform AM-PAC 6 Click Basic Mobility/ Daily Activity assessment daily.  - Set and communicate daily mobility goal to care team and patient/family/caregiver.   - Collaborate with rehabilitation services on mobility goals if consulted  - Perform Range of Motion 3 times a day.  - Reposition patient every 2 hours.  - Dangle patient 3 times a day  - Stand patient 3 times a day  - Ambulate patient 3 times a day  - Out of bed to chair 3 times a day   - Out of bed for meals 3 times a day  - Out of bed for toileting  - Record patient progress and toleration of activity level   Outcome: Progressing     Problem: DISCHARGE PLANNING  Goal: Discharge to home or other facility with appropriate resources  Description: INTERVENTIONS:  - Identify barriers to discharge w/patient and  caregiver  - Arrange for needed discharge resources and transportation as appropriate  - Identify discharge learning needs (meds, wound care, etc.)  - Arrange for interpretive services to assist at discharge as needed  - Refer to Case Management Department for coordinating discharge planning if the patient needs post-hospital services based on physician/advanced practitioner order or complex needs related to functional status, cognitive ability, or social support system  Outcome: Progressing     Problem: Knowledge Deficit  Goal: Patient/family/caregiver demonstrates understanding of disease process, treatment plan, medications, and discharge instructions  Description: Complete learning assessment and assess knowledge base.  Interventions:  - Provide teaching at level of understanding  - Provide teaching via preferred learning methods  Outcome: Progressing     Problem: Prexisting or High Potential for Compromised Skin Integrity  Goal: Skin integrity is maintained or improved  Description: INTERVENTIONS:  - Identify patients at risk for skin breakdown  - Assess and monitor skin integrity  - Assess and monitor nutrition and hydration status  - Monitor labs   - Assess for incontinence   - Turn and reposition patient  - Assist with mobility/ambulation  - Relieve pressure over bony prominences  - Avoid friction and shearing  - Provide appropriate hygiene as needed including keeping skin clean and dry  - Evaluate need for skin moisturizer/barrier cream  - Collaborate with interdisciplinary team   - Patient/family teaching  - Consider wound care consult   Outcome: Progressing

## 2024-10-06 NOTE — DISCHARGE SUMMARY
Discharge Summary - Hospitalist   Name: Bailey Olsen 53 y.o. female I MRN: [id number]  Unit/Bed#: -01 I Date of Admission: 10/4/2024   Date of Service: 10/6/2024 I Hospital Day: 2     Assessment & Plan  Fall  Patient presented with a fall last night at home.  She has issues of L1 fracture in the past and chronic back pain issues.  Now again here with physical deconditioning and fall.  Consult pt/ot and scheduled for outpatient kyphoplasty on 9/15.  Pain control ,lso brace    Hypokalemia  Potassium 2.9 on presentation.will give replacement orally and recheck bmp.   Magnesium is normal  potassium improved to 3.5    Hepatic encephalopathy (HCC)  Patient found to have hepatic encephalopathy. ammonia Level elevated to 119.  Patient states that she ran out of her lactulose yesterday.  She also states that she has not had a bowel movement in over 3 days despite taking MiraLAX as well.  received lactulose retention enema followed by oral lactulose 30 g every 2 hours until patient had at least 3 soft bowel movements.  Recheck ammonia level   Change lactulose to 30 g 4 times daily today and continue Xifaxan  Is also on chronic narcotic therapy for her back pain which also causes bad constipation issues for her  hepatic encephalopathy has resolved and ammonia level was down to 50.  KISHORE (acute kidney injury) ([facility])  Creatinine is 1-1.2 currently creatinine is elevated to 1.6.  Will place on gentle hydration and monitor for now hold diuretics  Renal Function has now improved and back to baseline.  Will resume diuretics and stop IV fluids.  She normally takes torsemide 20 mg twice daily and aldactone 50 mg daily at home . will restart torsemide 20 mg once daily and aldactone 50 mg daily.  Bmp outpt in 1 week  Morbid obesity with BMI of 40.0-44.9, adult ([facility])  Bmi -43.27.     Medical Problems       Resolved Problems  Date Reviewed: 10/6/2024   None       Discharging Physician / Practitioner: Idalia Stuart MD  PCP: Nayely  OLGA Brown DO  Admission Date:   Admission Orders (From admission, onward)       Ordered        10/04/24 0811  INPATIENT ADMISSION  Once                          Discharge Date: 10/06/24    Consultations During Hospital Stay:  gi    Procedures Performed:   none    Significant Findings / Test Results:   CT chest abdomen pelvis w contrast    Result Date: 10/4/2024  Impression: Similar focal groundglass attenuation in the left upper lobe at site of prior pneumonia. Recommend 6-month chest CT to confirm resolution. Cirrhosis with sequela of portal hypertension. No ascites. Unchanged subacute/chronic L1 superior endplate compression fracture. No acute fracture. Workstation performed: [id number]     CT recon only lumbar spine    Result Date: 10/4/2024  Impression: Similar focal groundglass attenuation in the left upper lobe at site of prior pneumonia. Recommend 6-month chest CT to confirm resolution. Cirrhosis with sequela of portal hypertension. No ascites. Unchanged subacute/chronic L1 superior endplate compression fracture. No acute fracture. Workstation performed: [id number]     CT head without contrast    Result Date: 10/4/2024  Impression: No acute intracranial abnormality. No cervical spine fracture or traumatic malalignment. Workstation performed: [id number]     CT spine cervical without contrast    Result Date: 10/4/2024  Impression: No acute intracranial abnormality. No cervical spine fracture or traumatic malalignment. Workstation performed: [id number]      Incidental Findings:     Test Results Pending at Discharge (will require follow up):   none     Outpatient Tests Requested:  Cbc,cmp,ammonia level in 10 days    Complications:  none    Reason for Admission: recurrent falls    Hospital Course:   Bailey Olsen is a 53 y.o. female patient who originally presented to the hospital on 10/4/2024 due to recurrent falls.  Patient has known liver cirrhosis with hepatic encephalopathy takes lactulose and Xifaxan.  She  "recently ran out of lactulose and also got constipated which caused her to have acute kidney injury recurrent falls and worsening back pain and hypokalemia.  Patient's electrolytes have been replaced received lactulose and has had several bowel movements and now her ammonia level is down to 50.  She is clinically improving renal function is back to baseline pain is controlled.  Will discharge her home with outpatient follow-up for kyphoplasty as scheduled.  If it can be done sooner it would be better to help control her pain issues.  Strongly recommended that she must remain compliant on lactulose      Please see above list of diagnoses and related plan for additional information.     Condition at Discharge: fair    Discharge Day Visit / Exam:   Subjective: Patient denies any chest pain or shortness of breath had several bowel movements over the last 2 days feeling much clearer today.  Her back pain is better controlled on pain regimen today  Vitals: Blood Pressure: 121/75 (10/06/24 0720)  Pulse: 85 (10/06/24 0720)  Temperature: (!) 97.3 °F (36.3 °C) (10/05/24 2140)  Temp Source: Temporal (10/05/24 0814)  Respirations: 17 (10/06/24 0720)  Height: 5' 5\" (165.1 cm) (10/04/24 0909)  Weight - Scale: 118 kg (260 lb) (10/04/24 0909)  SpO2: 94 % (10/06/24 0720)  Physical Exam  Vitals and nursing note reviewed.   Constitutional:       Appearance: Normal appearance.   HENT:      Head: Normocephalic and atraumatic.      Right Ear: External ear normal.      Left Ear: External ear normal.      Nose: Nose normal.      Mouth/Throat:      Pharynx: Oropharynx is clear.   Eyes:      Pupils: Pupils are equal, round, and reactive to light.   Cardiovascular:      Rate and Rhythm: Normal rate and regular rhythm.      Heart sounds: Normal heart sounds.   Pulmonary:      Effort: Pulmonary effort is normal.      Breath sounds: Normal breath sounds.   Abdominal:      General: Bowel sounds are normal.      Palpations: Abdomen is soft.      " Tenderness: There is abdominal tenderness.   Musculoskeletal:         General: Normal range of motion.      Cervical back: Normal range of motion and neck supple.   Skin:     General: Skin is warm and dry.      Capillary Refill: Capillary refill takes less than 2 seconds.   Neurological:      General: No focal deficit present.      Mental Status: She is alert and oriented to person, place, and time.   Psychiatric:         Mood and Affect: Mood normal.            Discussion with Family: Discussed with spouse during this admission    Discharge instructions/Information to patient and family:   See after visit summary for information provided to patient and family.      Provisions for Follow-Up Care:  See after visit summary for information related to follow-up care and any pertinent home health orders.      Mobility at time of Discharge:   Basic Mobility Inpatient Raw Score: 17  JH-HLM Goal: 5: Stand one or more mins  JH-HLM Achieved: 4: Move to chair/commode  HLM Goal achieved. Continue to encourage appropriate mobility.     Disposition:   Home with VNA Services (Reminder: Complete face to face encounter)    Planned Readmission: none    Discharge Medications:  See after visit summary for reconciled discharge medications provided to patient and/or family.      Administrative Statements   Discharge Statement:  I have spent a total time of 35 minutes in caring for this patient on the day of the visit/encounter. .    **Please Note: This note may have been constructed using a voice recognition system**

## 2024-10-06 NOTE — ASSESSMENT & PLAN NOTE
Patient found to have hepatic encephalopathy. ammonia Level elevated to 119.  Patient states that she ran out of her lactulose yesterday.  She also states that she has not had a bowel movement in over 3 days despite taking MiraLAX as well.  received lactulose retention enema followed by oral lactulose 30 g every 2 hours until patient had at least 3 soft bowel movements.  Recheck ammonia level   Change lactulose to 30 g 4 times daily today and continue Xifaxan  Is also on chronic narcotic therapy for her back pain which also causes bad constipation issues for her  hepatic encephalopathy has resolved and ammonia level was down to 50.

## 2024-10-06 NOTE — CASE MANAGEMENT
Case Management Discharge Planning Note    Patient name Bailey Olsen  Location /-01 MRN [id number]  : 1970 Date 10/6/2024       Current Admission Date: 10/4/2024  Current Admission Diagnosis:Fall   Patient Active Problem List    Diagnosis Date Noted Date Diagnosed    KISHORE (acute kidney injury) (HCC) 10/04/2024     Morbid obesity with BMI of 40.0-44.9, adult (HCC) 10/04/2024     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) 2024     Fall from standing 2024     Closed compression fracture of L1 lumbar vertebra, sequela 2024     Closed stable burst fracture of first lumbar vertebra with routine healing 2024     Groin hematoma 2024     Ambulatory dysfunction 2024     Intractable back pain 2024     Hyperammonemia (HCC) 2024     Hepatic encephalopathy (HCC) 2024     Fall 2024     Other cirrhosis of liver (HCC) 2024     History of malignant carcinoid tumor of small intestine 2024     Class 3 severe obesity due to excess calories in adult (HCC) 2024     Primary hypertension 2024     Anxiety 2024     Hypokalemia 2024     Fibromyalgia 2024     Iron deficiency anemia 2024     Pancytopenia (HCC) 2024     Sarcoidosis 2024     Bilateral pulmonary infiltrates 2024       LOS (days): 2  Geometric Mean LOS (GMLOS) (days): 3.3  Days to GMLOS:1.1     OBJECTIVE:  Risk of Unplanned Readmission Score: 32.76         Current admission status: Inpatient   Preferred Pharmacy:   [facility] - [place], [place] - [address]  [address] [facility]   Phone: [phone] Fax: [phone]    [facility]/pharmacy #[id number] - [place], [place] - [address]  [address] [place] [address]   Phone: [phone] Fax: [phone]    Primary Care Provider: Nayely Brown DO    Primary Insurance: MEDICARE  Secondary  Insurance: [facility]    DISCHARGE DETAILS:        CM uploaded AVS and script to [facility] and updated [facility] on discharge.

## 2024-10-06 NOTE — PROGRESS NOTES
Patient:    MRN:  [id number]    [facility] Request ID:  [id number]    Level of care reserved:  Home Health Agency    Partner Reserved:  [place] [address] [phone]    Clinical needs requested:  physical therapy, occupational therapy, skilled nursing    Geography searched:  [id number]    Start of Service:    Request sent:  9:10am EDT on 10/6/2024 by Anastasiya Rondon    Partner reserved:  9:27am EDT on 10/6/2024 by Anastasiya Rondon    Choice list shared:  9:27am EDT on 10/6/2024 by Anastasiya Rondon

## 2024-10-06 NOTE — PLAN OF CARE
Problem: PAIN - ADULT  Goal: Verbalizes/displays adequate comfort level or baseline comfort level  Description: Interventions:  - Encourage patient to monitor pain and request assistance  - Assess pain using appropriate pain scale0-10  - Administer analgesics based on type and severity of pain and evaluate response  - Implement non-pharmacological measures as appropriate and evaluate response  - Consider cultural and social influences on pain and pain management  - Notify physician/advanced practitioner if interventions unsuccessful or patient reports new pain  Outcome: Progressing     Problem: SAFETY ADULT  Goal: Patient will remain free of falls  Description: INTERVENTIONS:  - Educate patient/family on patient safety including physical limitations  - Instruct patient to call for assistance with activity   - Consult OT/PT to assist with strengthening/mobility   - Keep Call bell within reach  - Keep bed low and locked with side rails adjusted as appropriate  - Keep care items and personal belongings within reach  - Initiate and maintain comfort rounds  - Make Fall Risk Sign visible to staff  - Offer Toileting every 2 Hours, in advance of need  - Initiate/Maintain bed/chair alarm  - Obtain necessary fall risk management equipment:   - Apply yellow socks and bracelet for high fall risk patients  - Consider moving patient to room near nurses station  Outcome: Progressing

## 2024-10-06 NOTE — CASE MANAGEMENT
Case Management Discharge Planning Note    Patient name Bailey Olsen  Location /-01 MRN [id number]  : 1970 Date 10/6/2024       Current Admission Date: 10/4/2024  Current Admission Diagnosis:Fall   Patient Active Problem List    Diagnosis Date Noted Date Diagnosed    KISHORE (acute kidney injury) (HCC) 10/04/2024     Morbid obesity with BMI of 40.0-44.9, adult (HCC) 10/04/2024     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) 2024     Fall from standing 2024     Closed compression fracture of L1 lumbar vertebra, sequela 2024     Closed stable burst fracture of first lumbar vertebra with routine healing 2024     Groin hematoma 2024     Ambulatory dysfunction 2024     Intractable back pain 2024     Hyperammonemia (HCC) 2024     Hepatic encephalopathy (HCC) 2024     Fall 2024     Other cirrhosis of liver (HCC) 2024     History of malignant carcinoid tumor of small intestine 2024     Class 3 severe obesity due to excess calories in adult (HCC) 2024     Primary hypertension 2024     Anxiety 2024     Hypokalemia 2024     Fibromyalgia 2024     Iron deficiency anemia 2024     Pancytopenia (HCC) 2024     Sarcoidosis 2024     Bilateral pulmonary infiltrates 2024       LOS (days): 2  Geometric Mean LOS (GMLOS) (days): 3.3  Days to GMLOS:1.3     OBJECTIVE:  Risk of Unplanned Readmission Score: 31.23         Current admission status: Inpatient   Preferred Pharmacy:   [facility] - [place], [place] - [address]  [address] [facility]   Phone: [phone] Fax: [phone]    [facility]/pharmacy #[id number] - [place], [place] - [address]  [address]   Phone: [phone] Fax: [phone]    Primary Care Provider: Nayely Brown DO    Primary Insurance: MEDICARE  Secondary  Insurance: [facility]    DISCHARGE DETAILS:    Discharge planning discussed with:: Patient  Freedom of Choice: Yes  Comments - Freedom of Choice: [facility]                     Requested Home Health Care         Is the patient interested in HHC at discharge?: Yes  Home Health Discipline requested:: Nursing, Occupational Therapy, Physical Therapy  [facility] External Referral Reason (only applicable if external HHA name selected): Patient has established relationship with provider  Home Health Follow-Up Provider:: PCP  Home Health Services Needed:: Evaluate Functional Status and Safety, Strengthening/Theraputic Exercises to Improve Function  Homebound Criteria Met:: Uses an Assist Device (i.e. cane, walker, etc), Requires the Assistance of Another Person for Safe Ambulation or to Leave the Home  Supporting Clincal Findings:: Fatigues Easliy in Short Distances, Limited Endurance    DME Referral Provided  Referral made for DME?: No    Other Referral/Resources/Interventions Provided:  Interventions: HHC  Referral Comments: [facility]         Treatment Team Recommendation: Short Term Rehab  Discharge Destination Plan:: Home with Home Health Care  Transport at Discharge : Family          CM informed by provider of patient cleared for discharge and requiring HHC.     CM called patient and left message to review choice.     CM submitted HHC blanket referral.     CM informed by bedside nurse who discussed choice with patient of no preference. [facility] as patient has established relationship with them.     CM to follow patient's care and discharge needs.

## 2024-10-06 NOTE — ASSESSMENT & PLAN NOTE
Creatinine is 1-1.2 currently creatinine is elevated to 1.6.  Will place on gentle hydration and monitor for now hold diuretics  Renal Function has now improved and back to baseline.  Will resume diuretics and stop IV fluids.  She normally takes torsemide 20 mg twice daily and aldactone 50 mg daily at home . will restart torsemide 20 mg once daily and aldactone 50 mg daily.  Bmp outpt in 1 week

## 2024-10-07 ENCOUNTER — TELEPHONE (OUTPATIENT)
Age: 54
End: 2024-10-07

## 2024-10-07 NOTE — UTILIZATION REVIEW
NOTIFICATION OF ADMISSION DISCHARGE   This is a Notification of Discharge from [facility]. Please be advised that this patient has been discharge from our facility. Below you will find the admission and discharge date and time including the patient’s disposition.   UTILIZATION REVIEW CONTACT:  Елена Self  Utilization   Network Utilization Review Department  Phone: [phone] x carefully listen to the prompts. All voicemails are confidential.  Email: [email or web address]     ADMISSION INFORMATION  PRESENTATION DATE: 10/4/2024  6:13 AM  OBERVATION ADMISSION DATE: N/A  INPATIENT ADMISSION DATE: 10/4/24  8:11 AM   DISCHARGE DATE: 10/6/2024 12:49 PM   DISPOSITION:Home with Home Health Care    Network Utilization Review Department  ATTENTION: Please call with any questions or concerns to [phone] and carefully listen to the prompts so that you are directed to the right person. All voicemails are confidential.   For Discharge needs, contact Care Management DC Support Team at [phone] opt. [phone]  Send all requests for admission clinical reviews, approved or denied determinations and any other requests to dedicated fax number below belonging to the campus where the patient is receiving treatment. List of dedicated fax numbers for the Facilities:  FACILITY NAME UR FAX NUMBER   ADMISSION DENIALS (Administrative/Medical Necessity) [phone]   DISCHARGE SUPPORT TEAM ([facility]) [phone]   PARENT CHILD HEALTH (Maternity/NICU/Pediatrics) [phone]   [facility] [phone]   [facility] [phone]   [facility] [phone]   [facility] [phone]   [facility] [phone]   [facility] [phone]   [facility] [phone]   [facility]  [facility] [phone]   [facility] [phone]   [facility] [phone]   [facility] [phone]   [facility] [phone]

## 2024-10-07 NOTE — TELEPHONE ENCOUNTER
Pt called and confirmed times,  date and locations 10/14 in [place] and 10/15 in [facility]

## 2024-10-08 ENCOUNTER — TELEPHONE (OUTPATIENT)
Dept: NEUROSURGERY | Facility: CLINIC | Age: 54
End: 2024-10-08

## 2024-10-08 NOTE — TELEPHONE ENCOUNTER
Spoke with patient both in the morning and the afternoon on 10/7 regarding her MC and Cardio clearance.  Explained that her hospital admit over the past weekend was not a clearance from her PCP.  Also explained that it appeared that her upcoming appt on 10/14 was not for a cardio clearance as she believed, but for an ECHO test.  Advised patient to contact both providers to see if an appt would be required for clearance or if a clearance form could be sent.  In between the calls,  had PAT nurse review chart and confirmed that the discharge note from the weekend was not a clearance unless the hospitalist was a provider at the PCP office and the ECHO was a test and not a clearance appt.  Reiterated this to the patient and advised that without the clearances the procedure would need to be rescheduled until it was obtained.  Patient stated she would contact both providers the next day and SS stated she would call back the next afternoon for an update.

## 2024-10-09 ENCOUNTER — TELEPHONE (OUTPATIENT)
Dept: RADIOLOGY | Facility: HOSPITAL | Age: 54
End: 2024-10-09

## 2024-10-09 NOTE — PRE-PROCEDURE INSTRUCTIONS
Pre-procedure Instructions for Interventional Radiology  [address] [address]  INTERVENTIONAL RADIOLOGY [phone]    You are scheduled for a/an kyphoplasty with biopsy.    On Tuesday 10-15-24.    Your tentative arrival time is 9:30am.  Short stay will notify you the day before your procedure with the exact arrival time and the location to arrive.    To prepare for your procedure:  Please arrange for someone to drive you home after the procedure and stay with you until the next morning if you are instructed to do so.  This is typically for patients receiving some type of sedative or anesthetic for the procedure.  DO NOT EAT OR DRINK ANYTHING after midnight on the evening before your procedure including candy & gum.  ONLY SIPS OF WATER with your medications are allowed on the morning of your procedure.  TAKE ALL OF YOUR REGULAR MEDICATIONS THE MORNING OF YOUR PROCEDURE with sips of water!  We may call you to stop some of your blood sugar, blood pressure and blood thinning medications depending on the procedure.  Please take all of these medications unless we instruct you to stop them.  If you have an allergy to x-ray dye, please contact Interventional Radiology for an x-ray dye preparation which usually consists of an oral steroid and Benadryl.    The day of your procedure:  Bring a list of the medications you take at home.  Bring medications you take for breathing problems (such as inhalers), medications for chest pain, or both.  Bring a case for your glasses or contacts.  Bring your insurance card and a form of photo ID.  Please leave all valuables such as credit cards and jewelry at home.  Report to the admitting office to the left of the registration desk in the main lobby at the [facility], Entrance B.  You will then be directed to the Short Stay Center.  While your procedure is being performed, your family may wait in the Radiology Waiting Room on the 1st  floor in Radiology.  if they need to leave, they may provide a number to be called following the procedure.   Be prepared to stay overnight just in case. Sometimes procedures will indicate the need for further observation or treatment.   If you are scheduled for a follow-up visit with the Interventional Radiologist after your procedure, you will be called with a date and time.    Special Instructions (Medications to stop taking before your procedure etc.):  Hold Demadex the morning of the procedure.  Medical clearance needed.  (Appt on 10-10-24.)

## 2024-10-11 ENCOUNTER — TELEPHONE (OUTPATIENT)
Dept: NEUROSURGERY | Facility: CLINIC | Age: 54
End: 2024-10-11

## 2024-10-11 ENCOUNTER — TELEPHONE (OUTPATIENT)
Age: 54
End: 2024-10-11

## 2024-10-11 NOTE — TELEPHONE ENCOUNTER
Received call from nurse navigator at [facility], patient's oncologist Dr. Lopez is requesting that a tissue sample is taken and sent to pathology during her kyphoplasty next week.

## 2024-10-14 ENCOUNTER — HOSPITAL ENCOUNTER (OUTPATIENT)
Dept: NON INVASIVE DIAGNOSTICS | Facility: HOSPITAL | Age: 54
Discharge: HOME | End: 2024-10-14
Attending: INTERNAL MEDICINE
Payer: COMMERCIAL

## 2024-10-14 VITALS
SYSTOLIC BLOOD PRESSURE: 121 MMHG | WEIGHT: 260 LBS | HEIGHT: 65 IN | DIASTOLIC BLOOD PRESSURE: 75 MMHG | HEART RATE: 88 BPM | BODY MASS INDEX: 43.32 KG/M2

## 2024-10-14 DIAGNOSIS — I10 ESSENTIAL (PRIMARY) HYPERTENSION: ICD-10-CM

## 2024-10-14 LAB
AORTIC ROOT: 3.1 CM
AORTIC VALVE MEAN VELOCITY: 16.3 M/S
APICAL FOUR CHAMBER EJECTION FRACTION: 59 %
AV AREA BY CONTINUOUS VTI: 1.1 CM2
AV AREA PEAK VELOCITY: 1 CM2
AV LVOT MEAN GRADIENT: 5 MMHG
AV LVOT PEAK GRADIENT: 7 MMHG
AV MEAN GRADIENT: 12 MMHG
AV PEAK GRADIENT: 21 MMHG
AV VALVE AREA: 1.15 CM2
AV VELOCITY RATIO: 0.59
BSA FOR ECHO PROCEDURE: 2.21 M2
DOP CALC AO PEAK VEL: 2.32 M/S
DOP CALC AO VTI: 51.43 CM
DOP CALC LVOT AREA: 1.77 CM2
DOP CALC LVOT CARDIAC INDEX: 2.31 L/MIN/M2
DOP CALC LVOT CARDIAC OUTPUT: 5.1 L/MIN
DOP CALC LVOT DIAMETER: 1.5 CM
DOP CALC LVOT PEAK VEL VTI: 33.41 CM
DOP CALC LVOT PEAK VEL: 1.37 M/S
DOP CALC LVOT STROKE INDEX: 26.2 ML/M2
DOP CALC LVOT STROKE VOLUME: 59.01
E WAVE DECELERATION TIME: 281 MS
E/A RATIO: 1.05
FRACTIONAL SHORTENING: 33 (ref 28–44)
INTERVENTRICULAR SEPTUM IN DIASTOLE (PARASTERNAL SHORT AXIS VIEW): 1.2 CM
INTERVENTRICULAR SEPTUM: 1.2 CM (ref 0.6–1.1)
LAAS-AP2: 21.5 CM2
LAAS-AP4: 25.9 CM2
LEFT ATRIUM AREA SYSTOLE SINGLE PLANE A4C: 26.1 CM2
LEFT ATRIUM SIZE: 4 CM
LEFT ATRIUM VOLUME (MOD BIPLANE): 84 ML
LEFT ATRIUM VOLUME INDEX (MOD BIPLANE): 38 ML/M2
LEFT INTERNAL DIMENSION IN SYSTOLE: 3.1 CM (ref 2.1–4)
LEFT VENTRICULAR INTERNAL DIMENSION IN DIASTOLE: 4.6 CM (ref 3.5–6)
LEFT VENTRICULAR POSTERIOR WALL IN END DIASTOLE: 1.2 CM
LEFT VENTRICULAR STROKE VOLUME: 61 ML
LVSV (TEICH): 61 ML
MV E'TISSUE VEL-LAT: 12 CM/S
MV E'TISSUE VEL-SEP: 11 CM/S
MV PEAK A VEL: 1.24 M/S
MV PEAK E VEL: 130 CM/S
MV STENOSIS PRESSURE HALF TIME: 82 MS
MV VALVE AREA P 1/2 METHOD: 2.68
RIGHT ATRIUM AREA SYSTOLE A4C: 23.3 CM2
RIGHT VENTRICLE ID DIMENSION: 4.5 CM
SL CV LEFT ATRIUM LENGTH A2C: 5.3 CM
SL CV LV EF: 60
SL CV PED ECHO LEFT VENTRICLE DIASTOLIC VOLUME (MOD BIPLANE) 2D: 99 ML
SL CV PED ECHO LEFT VENTRICLE SYSTOLIC VOLUME (MOD BIPLANE) 2D: 39 ML
TR MAX PG: 27 MMHG
TR PEAK VELOCITY: 2.6 M/S
TRICUSPID ANNULAR PLANE SYSTOLIC EXCURSION: 2.4 CM
TRICUSPID VALVE PEAK REGURGITATION VELOCITY: 2.62 M/S

## 2024-10-14 PROCEDURE — 93306 TTE W/DOPPLER COMPLETE: CPT

## 2024-10-15 ENCOUNTER — ANESTHESIA EVENT (OUTPATIENT)
Dept: RADIOLOGY | Facility: HOSPITAL | Age: 54
End: 2024-10-15
Payer: COMMERCIAL

## 2024-10-15 ENCOUNTER — ANESTHESIA (OUTPATIENT)
Dept: RADIOLOGY | Facility: HOSPITAL | Age: 54
End: 2024-10-15
Payer: COMMERCIAL

## 2024-10-15 ENCOUNTER — HOSPITAL ENCOUNTER (OUTPATIENT)
Dept: RADIOLOGY | Facility: HOSPITAL | Age: 54
Discharge: HOME | End: 2024-10-15
Attending: RADIOLOGY
Payer: COMMERCIAL

## 2024-10-15 VITALS
RESPIRATION RATE: 17 BRPM | TEMPERATURE: 97.3 F | DIASTOLIC BLOOD PRESSURE: 71 MMHG | HEART RATE: 85 BPM | SYSTOLIC BLOOD PRESSURE: 139 MMHG | OXYGEN SATURATION: 98 %

## 2024-10-15 DIAGNOSIS — M80.88XA PATHOLOGICAL FRACTURE OF VERTEBRA DUE TO SECONDARY OSTEOPOROSIS (HCC): ICD-10-CM

## 2024-10-15 LAB
ABO GROUP BLD: NORMAL
ALBUMIN SERPL BCG-MCNC: 3.2 G/DL (ref 3.5–5)
ALP SERPL-CCNC: 215 U/L (ref 34–104)
ALT SERPL W P-5'-P-CCNC: 23 U/L (ref 7–52)
ANION GAP SERPL CALCULATED.3IONS-SCNC: 3 MMOL/L (ref 4–13)
APTT PPP: 30 SECONDS (ref 23–34)
AST SERPL W P-5'-P-CCNC: 48 U/L (ref 13–39)
BILIRUB SERPL-MCNC: 1.07 MG/DL (ref 0.2–1)
BLD GP AB SCN SERPL QL: NEGATIVE
BUN SERPL-MCNC: 8 MG/DL (ref 5–25)
CALCIUM ALBUM COR SERPL-MCNC: 9 MG/DL (ref 8.3–10.1)
CALCIUM SERPL-MCNC: 8.4 MG/DL (ref 8.4–10.2)
CHLORIDE SERPL-SCNC: 108 MMOL/L (ref 96–108)
CO2 SERPL-SCNC: 28 MMOL/L (ref 21–32)
CREAT SERPL-MCNC: 0.61 MG/DL (ref 0.6–1.3)
ERYTHROCYTE [DISTWIDTH] IN BLOOD BY AUTOMATED COUNT: 14.8 % (ref 11.6–15.1)
GFR SERPL CREATININE-BSD FRML MDRD: 103 ML/MIN/1.73SQ M
GLUCOSE P FAST SERPL-MCNC: 83 MG/DL (ref 65–99)
GLUCOSE SERPL-MCNC: 83 MG/DL (ref 65–140)
HCT VFR BLD AUTO: 32 % (ref 34.8–46.1)
HGB BLD-MCNC: 10.4 G/DL (ref 11.5–15.4)
INR PPP: 1.2 (ref 0.85–1.19)
MCH RBC QN AUTO: 32 PG (ref 26.8–34.3)
MCHC RBC AUTO-ENTMCNC: 32.5 G/DL (ref 31.4–37.4)
MCV RBC AUTO: 99 FL (ref 82–98)
PLATELET # BLD AUTO: 67 THOUSANDS/UL (ref 149–390)
PMV BLD AUTO: 9.9 FL (ref 8.9–12.7)
POTASSIUM SERPL-SCNC: 3.9 MMOL/L (ref 3.5–5.3)
PROT SERPL-MCNC: 7.2 G/DL (ref 6.4–8.4)
PROTHROMBIN TIME: 15.5 SECONDS (ref 12.3–15)
RBC # BLD AUTO: 3.25 MILLION/UL (ref 3.81–5.12)
RH BLD: POSITIVE
SODIUM SERPL-SCNC: 139 MMOL/L (ref 135–147)
SPECIMEN EXPIRATION DATE: NORMAL
WBC # BLD AUTO: 2.21 THOUSAND/UL (ref 4.31–10.16)

## 2024-10-15 PROCEDURE — 22514 PERQ VERTEBRAL AUGMENTATION: CPT | Performed by: RADIOLOGY

## 2024-10-15 PROCEDURE — 85610 PROTHROMBIN TIME: CPT | Performed by: RADIOLOGY

## 2024-10-15 PROCEDURE — 88305 TISSUE EXAM BY PATHOLOGIST: CPT | Performed by: PATHOLOGY

## 2024-10-15 PROCEDURE — P9035 PLATELET PHERES LEUKOREDUCED: HCPCS

## 2024-10-15 PROCEDURE — 86901 BLOOD TYPING SEROLOGIC RH(D): CPT | Performed by: RADIOLOGY

## 2024-10-15 PROCEDURE — C1713 ANCHOR/SCREW BN/BN,TIS/BN: HCPCS

## 2024-10-15 PROCEDURE — 85730 THROMBOPLASTIN TIME PARTIAL: CPT | Performed by: RADIOLOGY

## 2024-10-15 PROCEDURE — 86900 BLOOD TYPING SEROLOGIC ABO: CPT | Performed by: RADIOLOGY

## 2024-10-15 PROCEDURE — 85027 COMPLETE CBC AUTOMATED: CPT | Performed by: RADIOLOGY

## 2024-10-15 PROCEDURE — 86850 RBC ANTIBODY SCREEN: CPT | Performed by: RADIOLOGY

## 2024-10-15 PROCEDURE — 22514 PERQ VERTEBRAL AUGMENTATION: CPT

## 2024-10-15 PROCEDURE — 80053 COMPREHEN METABOLIC PANEL: CPT | Performed by: RADIOLOGY

## 2024-10-15 PROCEDURE — P9037 PLATE PHERES LEUKOREDU IRRAD: HCPCS

## 2024-10-15 PROCEDURE — 20225 BONE BIOPSY TROCAR/NDL DEEP: CPT

## 2024-10-15 PROCEDURE — 88342 IMHCHEM/IMCYTCHM 1ST ANTB: CPT | Performed by: PATHOLOGY

## 2024-10-15 RX ORDER — ROCURONIUM BROMIDE 10 MG/ML
INJECTION, SOLUTION INTRAVENOUS AS NEEDED
Status: DISCONTINUED | OUTPATIENT
Start: 2024-10-15 | End: 2024-10-15

## 2024-10-15 RX ORDER — BUPIVACAINE HYDROCHLORIDE AND EPINEPHRINE 5; 5 MG/ML; UG/ML
INJECTION, SOLUTION EPIDURAL; INTRACAUDAL; PERINEURAL AS NEEDED
Status: COMPLETED | OUTPATIENT
Start: 2024-10-15 | End: 2024-10-15

## 2024-10-15 RX ORDER — SODIUM CHLORIDE 9 MG/ML
75 INJECTION, SOLUTION INTRAVENOUS CONTINUOUS
Status: DISCONTINUED | OUTPATIENT
Start: 2024-10-15 | End: 2024-10-16 | Stop reason: HOSPADM

## 2024-10-15 RX ORDER — OXYCODONE HYDROCHLORIDE 5 MG/1
5 TABLET ORAL EVERY 4 HOURS PRN
Status: DISCONTINUED | OUTPATIENT
Start: 2024-10-15 | End: 2024-10-16 | Stop reason: HOSPADM

## 2024-10-15 RX ORDER — SODIUM CHLORIDE 9 MG/ML
INJECTION, SOLUTION INTRAVENOUS CONTINUOUS PRN
Status: DISCONTINUED | OUTPATIENT
Start: 2024-10-15 | End: 2024-10-15

## 2024-10-15 RX ORDER — FENTANYL CITRATE 50 UG/ML
INJECTION, SOLUTION INTRAMUSCULAR; INTRAVENOUS AS NEEDED
Status: DISCONTINUED | OUTPATIENT
Start: 2024-10-15 | End: 2024-10-15

## 2024-10-15 RX ORDER — ONDANSETRON 2 MG/ML
INJECTION INTRAMUSCULAR; INTRAVENOUS AS NEEDED
Status: DISCONTINUED | OUTPATIENT
Start: 2024-10-15 | End: 2024-10-15

## 2024-10-15 RX ORDER — DEXAMETHASONE SODIUM PHOSPHATE 10 MG/ML
INJECTION, SOLUTION INTRAMUSCULAR; INTRAVENOUS AS NEEDED
Status: DISCONTINUED | OUTPATIENT
Start: 2024-10-15 | End: 2024-10-15

## 2024-10-15 RX ORDER — FENTANYL CITRATE/PF 50 MCG/ML
50 SYRINGE (ML) INJECTION
Status: DISCONTINUED | OUTPATIENT
Start: 2024-10-15 | End: 2024-10-16 | Stop reason: HOSPADM

## 2024-10-15 RX ORDER — HYDROMORPHONE HCL/PF 1 MG/ML
0.5 SYRINGE (ML) INJECTION
Status: DISCONTINUED | OUTPATIENT
Start: 2024-10-15 | End: 2024-10-16 | Stop reason: HOSPADM

## 2024-10-15 RX ORDER — PROPOFOL 10 MG/ML
INJECTION, EMULSION INTRAVENOUS AS NEEDED
Status: DISCONTINUED | OUTPATIENT
Start: 2024-10-15 | End: 2024-10-15

## 2024-10-15 RX ORDER — LIDOCAINE HYDROCHLORIDE 10 MG/ML
INJECTION, SOLUTION EPIDURAL; INFILTRATION; INTRACAUDAL; PERINEURAL AS NEEDED
Status: DISCONTINUED | OUTPATIENT
Start: 2024-10-15 | End: 2024-10-15

## 2024-10-15 RX ORDER — ONDANSETRON 2 MG/ML
4 INJECTION INTRAMUSCULAR; INTRAVENOUS ONCE AS NEEDED
Status: DISCONTINUED | OUTPATIENT
Start: 2024-10-15 | End: 2024-10-16 | Stop reason: HOSPADM

## 2024-10-15 RX ORDER — LIDOCAINE HYDROCHLORIDE 10 MG/ML
INJECTION, SOLUTION EPIDURAL; INFILTRATION; INTRACAUDAL; PERINEURAL AS NEEDED
Status: COMPLETED | OUTPATIENT
Start: 2024-10-15 | End: 2024-10-15

## 2024-10-15 RX ADMIN — DEXAMETHASONE SODIUM PHOSPHATE 10 MG: 10 INJECTION, SOLUTION INTRAMUSCULAR; INTRAVENOUS at 11:41

## 2024-10-15 RX ADMIN — VANCOMYCIN HYDROCHLORIDE 1500 MG: 10 INJECTION, POWDER, LYOPHILIZED, FOR SOLUTION INTRAVENOUS at 11:23

## 2024-10-15 RX ADMIN — ONDANSETRON 4 MG: 2 INJECTION INTRAMUSCULAR; INTRAVENOUS at 11:41

## 2024-10-15 RX ADMIN — PROPOFOL 200 MG: 10 INJECTION, EMULSION INTRAVENOUS at 11:11

## 2024-10-15 RX ADMIN — OXYCODONE HYDROCHLORIDE 5 MG: 5 TABLET ORAL at 14:05

## 2024-10-15 RX ADMIN — SUGAMMADEX 200 MG: 100 INJECTION, SOLUTION INTRAVENOUS at 12:12

## 2024-10-15 RX ADMIN — PROPOFOL 100 MG: 10 INJECTION, EMULSION INTRAVENOUS at 11:12

## 2024-10-15 RX ADMIN — FENTANYL CITRATE 50 MCG: 50 INJECTION INTRAMUSCULAR; INTRAVENOUS at 11:11

## 2024-10-15 RX ADMIN — LIDOCAINE HYDROCHLORIDE 10 ML: 10 INJECTION, SOLUTION EPIDURAL; INFILTRATION; INTRACAUDAL; PERINEURAL at 11:45

## 2024-10-15 RX ADMIN — LIDOCAINE HYDROCHLORIDE 50 MG: 10 INJECTION, SOLUTION EPIDURAL; INFILTRATION; INTRACAUDAL; PERINEURAL at 11:11

## 2024-10-15 RX ADMIN — FENTANYL CITRATE 50 MCG: 50 INJECTION INTRAMUSCULAR; INTRAVENOUS at 13:05

## 2024-10-15 RX ADMIN — FENTANYL CITRATE 50 MCG: 50 INJECTION INTRAMUSCULAR; INTRAVENOUS at 11:06

## 2024-10-15 RX ADMIN — BUPIVACAINE HYDROCHLORIDE AND EPINEPHRINE BITARTRATE 10 ML: 5; .0091 INJECTION, SOLUTION EPIDURAL; INTRACAUDAL; PERINEURAL at 11:45

## 2024-10-15 RX ADMIN — SODIUM CHLORIDE 75 ML/HR: 0.9 INJECTION, SOLUTION INTRAVENOUS at 09:51

## 2024-10-15 RX ADMIN — ROCURONIUM 50 MG: 50 INJECTION, SOLUTION INTRAVENOUS at 11:12

## 2024-10-15 RX ADMIN — SODIUM CHLORIDE: 0.9 INJECTION, SOLUTION INTRAVENOUS at 11:06

## 2024-10-15 NOTE — ANESTHESIA PREPROCEDURE EVALUATION
Procedure:  IR KYPHOPLASTY/VERTEBROPLASTY    Relevant Problems   CARDIO   (+) Primary hypertension      GI/HEPATIC   (+) Other cirrhosis of liver (HCC)      /RENAL   (+) KISHORE (acute kidney injury) (HCC)      HEMATOLOGY   (+) Iron deficiency anemia   (+) Pancytopenia (HCC)      MUSCULOSKELETAL   (+) Fibromyalgia   (+) Intractable back pain      NEURO/PSYCH   (+) Anxiety   (+) Fibromyalgia      Oncology   (+) History of malignant carcinoid tumor of small intestine      Other   (+) Morbid obesity with BMI of 40.0-44.9, adult (HCC)   (+) Sarcoidosis   10/14/24 Echo   Left Ventricle: Left ventricular cavity size is normal. There is mild concentric hypertrophy. The left ventricular ejection fraction is 60%. Systolic function is normal. Wall motion is normal. Diastolic function is normal.    Left Atrium: The atrium is mildly dilated.    Mitral Valve: There is mild regurgitation.    Tricuspid Valve: There is mild regurgitation. The right ventricular systolic pressure is normal.    Pericardium: There is no pericardial effusion.       10/4/24 CT:  Similar focal groundglass attenuation in the left upper lobe at site of prior pneumonia. Recommend 6-month chest CT to confirm resolution.     Cirrhosis with sequela of portal hypertension. No ascites.     Unchanged subacute/chronic L1 superior endplate compression fracture. No acute fracture.      Latest Reference Range & Units 10/06/24 05:23   Sodium 135 - 147 mmol/L 139   Potassium 3.5 - 5.3 mmol/L 3.5   Chloride 96 - 108 mmol/L 103   Carbon Dioxide 21 - 32 mmol/L 34 (H)   ANION GAP 4 - 13 mmol/L 2 (L)   BUN 5 - 25 mg/dL 15   Creatinine 0.60 - 1.30 mg/dL 0.72   GLUCOSE 65 - 140 mg/dL 86   Calcium 8.4 - 10.2 mg/dL 8.3 (L)   (H): Data is abnormally high  (L): Data is abnormally low  Physical Exam    Airway    Mallampati score: III  TM Distance: >3 FB  Neck ROM: full     Dental   No notable dental hx     Cardiovascular      Pulmonary      Other  Findings  post-pubertal.      Anesthesia Plan  ASA Score- 3     Anesthesia Type- general with ASA Monitors.         Additional Monitors:     Airway Plan: ETT.           Plan Factors-Exercise tolerance (METS): >4 METS.    Chart reviewed. EKG reviewed. Imaging results reviewed. Existing labs reviewed. Patient summary reviewed.    Patient is not a current smoker.              Induction- intravenous.    Postoperative Plan- . Planned trial extubation        Informed Consent- Anesthetic plan and risks discussed with patient.  I personally reviewed this patient with the CRNA. Discussed and agreed on the Anesthesia Plan with the CRNA..

## 2024-10-15 NOTE — ANESTHESIA POSTPROCEDURE EVALUATION
Post-Op Assessment Note    CV Status:  Stable  Pain Score: 0    Pain management: adequate       Mental Status:  Awake and alert   Hydration Status:  Stable   PONV Controlled:  None   Airway Patency:  Patent     Post Op Vitals Reviewed: Yes    No anethesia notable event occurred.    Staff: CRNA           Last Filed PACU Vitals:  Vitals Value Taken Time   Temp  99.2   Pulse 85 10/15/24 1225   /63 10/15/24 1225   Resp 16 10/15/24 1225   SpO2 94 % 10/15/24 1225   Vitals shown include unfiled device data.    Modified Trista:  No data recorded

## 2024-10-15 NOTE — SEDATION DOCUMENTATION
L1 kyphoplasty and biopsy performed by Dr Davis without complication. Patient tolerated procedure well. Anesthesia present throughout case. Specimens sent. IR Procedure Bedrest Start Time is 1215. Patient transported to PACU with IR RN and CRNA, report given bedside.

## 2024-10-16 LAB
ABO GROUP BLD BPU: NORMAL
BPU ID: NORMAL
UNIT DISPENSE STATUS: NORMAL
UNIT PRODUCT CODE: NORMAL
UNIT PRODUCT VOLUME: 300 ML
UNIT RH: NORMAL

## 2024-10-16 NOTE — ANESTHESIA POSTPROCEDURE EVALUATION
Post-Op Assessment Note             Post Op Vitals Reviewed: Yes    No anethesia notable event occurred.    Staff: Anesthesiologist, CRNA           Last Filed PACU Vitals:  Vitals Value Taken Time   Temp     Pulse 78 10/15/24 1321   /76 10/15/24 1315   Resp 8 10/15/24 1321   SpO2 98 % 10/15/24 1321   Vitals shown include unfiled device data.    Modified Trista:  Activity: 2 (10/15/2024  1:15 PM)  Respiration: 2 (10/15/2024  1:15 PM)  Circulation: 2 (10/15/2024  1:15 PM)  Consciousness: 2 (10/15/2024  1:15 PM)  Oxygen Saturation: 1 (10/15/2024  1:15 PM)  Modified Trista Score: 9 (10/15/2024  1:15 PM)

## 2024-10-17 ENCOUNTER — TELEPHONE (OUTPATIENT)
Dept: NEUROSURGERY | Facility: CLINIC | Age: 54
End: 2024-10-17

## 2024-10-17 NOTE — TELEPHONE ENCOUNTER
1st attempt - called Bailey Olsen on primary number s/p kypho performed 11/15/2024. There was no answer left message to call back direct line. Will make second attempt if no callback is received.

## 2024-10-18 PROCEDURE — 88305 TISSUE EXAM BY PATHOLOGIST: CPT | Performed by: PATHOLOGY

## 2024-10-18 PROCEDURE — 88342 IMHCHEM/IMCYTCHM 1ST ANTB: CPT | Performed by: PATHOLOGY

## 2024-10-21 ENCOUNTER — TELEPHONE (OUTPATIENT)
Age: 54
End: 2024-10-21

## 2024-10-21 NOTE — TELEPHONE ENCOUNTER
2nd attempt - called Bailey Olsen on primary number s/p kypho performed 10/15/2024. There was no answer left message to call back direct line. Will make another attempt if no callback is received.

## 2024-10-21 NOTE — TELEPHONE ENCOUNTER
Pt sister Fidelina called requesting to change pts appt with Dr. Davis on 11/15/24 to a different day earlier in the week as she is her transportation and is unable to make this appt.  Dr. Davis's next available is 12/13/24 and this appt is to be a 4 week f/u with biopsy results.  Will send to Admin/Clerical team to attempt to accommodate Fidelina's schedule.  She can be reached at [phone].  Thank you.

## 2024-11-14 ENCOUNTER — HOSPITAL ENCOUNTER (EMERGENCY)
Facility: HOSPITAL | Age: 54
Discharge: HOME | End: 2024-11-14
Attending: EMERGENCY MEDICINE
Payer: COMMERCIAL

## 2024-11-14 VITALS
DIASTOLIC BLOOD PRESSURE: 54 MMHG | RESPIRATION RATE: 18 BRPM | TEMPERATURE: 98.9 F | BODY MASS INDEX: 44.87 KG/M2 | HEART RATE: 83 BPM | WEIGHT: 269.62 LBS | SYSTOLIC BLOOD PRESSURE: 165 MMHG | OXYGEN SATURATION: 96 %

## 2024-11-14 DIAGNOSIS — E16.2 HYPOGLYCEMIA: Primary | ICD-10-CM

## 2024-11-14 LAB
ALBUMIN SERPL BCG-MCNC: 3.3 G/DL (ref 3.5–5)
ALP SERPL-CCNC: 204 U/L (ref 34–104)
ALT SERPL W P-5'-P-CCNC: 21 U/L (ref 7–52)
ANION GAP SERPL CALCULATED.3IONS-SCNC: 6 MMOL/L (ref 4–13)
AST SERPL W P-5'-P-CCNC: 41 U/L (ref 13–39)
BASOPHILS # BLD AUTO: 0.03 THOUSANDS/ÂΜL (ref 0–0.1)
BASOPHILS NFR BLD AUTO: 1 % (ref 0–1)
BILIRUB SERPL-MCNC: 1.66 MG/DL (ref 0.2–1)
BUN SERPL-MCNC: 10 MG/DL (ref 5–25)
CALCIUM ALBUM COR SERPL-MCNC: 9 MG/DL (ref 8.3–10.1)
CALCIUM SERPL-MCNC: 8.4 MG/DL (ref 8.4–10.2)
CHLORIDE SERPL-SCNC: 108 MMOL/L (ref 96–108)
CO2 SERPL-SCNC: 26 MMOL/L (ref 21–32)
CREAT SERPL-MCNC: 0.62 MG/DL (ref 0.6–1.3)
EOSINOPHIL # BLD AUTO: 0.05 THOUSAND/ÂΜL (ref 0–0.61)
EOSINOPHIL NFR BLD AUTO: 2 % (ref 0–6)
ERYTHROCYTE [DISTWIDTH] IN BLOOD BY AUTOMATED COUNT: 15 % (ref 11.6–15.1)
GFR SERPL CREATININE-BSD FRML MDRD: 101 ML/MIN/1.73SQ M
GLUCOSE SERPL-MCNC: 107 MG/DL (ref 65–140)
GLUCOSE SERPL-MCNC: 97 MG/DL (ref 65–140)
HCT VFR BLD AUTO: 32 % (ref 34.8–46.1)
HGB BLD-MCNC: 10.1 G/DL (ref 11.5–15.4)
IMM GRANULOCYTES # BLD AUTO: 0.01 THOUSAND/UL (ref 0–0.2)
IMM GRANULOCYTES NFR BLD AUTO: 0 % (ref 0–2)
LYMPHOCYTES # BLD AUTO: 0.63 THOUSANDS/ÂΜL (ref 0.6–4.47)
LYMPHOCYTES NFR BLD AUTO: 24 % (ref 14–44)
MCH RBC QN AUTO: 30.6 PG (ref 26.8–34.3)
MCHC RBC AUTO-ENTMCNC: 31.6 G/DL (ref 31.4–37.4)
MCV RBC AUTO: 97 FL (ref 82–98)
MONOCYTES # BLD AUTO: 0.17 THOUSAND/ÂΜL (ref 0.17–1.22)
MONOCYTES NFR BLD AUTO: 7 % (ref 4–12)
NEUTROPHILS # BLD AUTO: 1.69 THOUSANDS/ÂΜL (ref 1.85–7.62)
NEUTS SEG NFR BLD AUTO: 66 % (ref 43–75)
NRBC BLD AUTO-RTO: 0 /100 WBCS
PLATELET # BLD AUTO: 79 THOUSANDS/UL (ref 149–390)
PMV BLD AUTO: 9.4 FL (ref 8.9–12.7)
POTASSIUM SERPL-SCNC: 3.2 MMOL/L (ref 3.5–5.3)
PROT SERPL-MCNC: 7.5 G/DL (ref 6.4–8.4)
RBC # BLD AUTO: 3.3 MILLION/UL (ref 3.81–5.12)
SODIUM SERPL-SCNC: 140 MMOL/L (ref 135–147)
WBC # BLD AUTO: 2.58 THOUSAND/UL (ref 4.31–10.16)

## 2024-11-14 PROCEDURE — 99283 EMERGENCY DEPT VISIT LOW MDM: CPT

## 2024-11-14 PROCEDURE — 99284 EMERGENCY DEPT VISIT MOD MDM: CPT | Performed by: EMERGENCY MEDICINE

## 2024-11-14 PROCEDURE — 82948 REAGENT STRIP/BLOOD GLUCOSE: CPT

## 2024-11-14 PROCEDURE — 36415 COLL VENOUS BLD VENIPUNCTURE: CPT | Performed by: EMERGENCY MEDICINE

## 2024-11-14 PROCEDURE — 80053 COMPREHEN METABOLIC PANEL: CPT | Performed by: EMERGENCY MEDICINE

## 2024-11-14 PROCEDURE — 85025 COMPLETE CBC W/AUTO DIFF WBC: CPT | Performed by: EMERGENCY MEDICINE

## 2024-11-14 NOTE — ED PROVIDER NOTES
Time reflects when diagnosis was documented in both MDM as applicable and the Disposition within this note       Time User Action Codes Description Comment    11/14/2024  5:11 PM German Duckworth Add [E16.2] Hypoglycemia           ED Disposition       ED Disposition   Discharge    Condition   Stable    Date/Time   Thu Nov 14, 2024  5:11 PM    Comment   Bailey Olsen discharge to home/self care.                   Assessment & Plan       Medical Decision Making  This patient presents with abnormal labs.   Diagnostic considerations include hypoglycemia, medication reaction, dehydration. See ED Course.       Amount and/or Complexity of Data Reviewed  Labs: ordered. Decision-making details documented in ED Course.  ECG/medicine tests: ordered and independent interpretation performed. Decision-making details documented in ED Course.        ED Course as of 11/14/24 1717   Thu Nov 14, 2024   1710 WBC(!): 2.58   1710 Hemoglobin(!): 10.1   1710 Hematocrit(!): 32.0   1710 Platelet Count(!): 79   1710 Potassium(!): 3.2   1710 AST(!): 41   1710 ALK PHOS(!): 204   1710 Total Bilirubin(!): 1.66   1710 GLUCOSE: 107   1714 We discussed results, complications of neuroendocrine tumors and treatment and agree with close outpatient follow-up, dietary changes and will contact her oncologist for further advice, voices understanding to return immediately if worse or additional symptoms       Medications - No data to display    ED Risk Strat Scores                           SBIRT 22yo+      Flowsheet Row Most Recent Value   Initial Alcohol Screen: US AUDIT-C     1. How often do you have a drink containing alcohol? 0 Filed at: 11/14/2024 1614   2. How many drinks containing alcohol do you have on a typical day you are drinking?  0 Filed at: 11/14/2024 1614   3b. FEMALE Any Age, or MALE 65+: How often do you have 4 or more drinks on one occassion? 0 Filed at: 11/14/2024 1614   Audit-C Score 0 Filed at: 11/14/2024 1614   BERNIE: How many times  in the past year have you...    Used an illegal drug or used a prescription medication for non-medical reasons? Never Filed at: 11/14/2024 1614                            History of Present Illness       Chief Complaint   Patient presents with    Hypoglycemia - no symptoms     Patient presents to the ED with reports of low blood sugar. The patient reports having labs drawn this afternoon and received a phone call stating her glucose was 53.        Past Medical History:   Diagnosis Date    Back pain     Carcinoid tumor     neuroendocrine    CHF (congestive heart failure) (HCC)     Cirrhosis of liver (HCC)     Sarcoidosis       Past Surgical History:   Procedure Laterality Date    CHOLECYSTECTOMY      GASTRIC BYPASS      HERNIA REPAIR      IR KYPHOPLASTY/VERTEBROPLASTY  10/15/2024      Family History   Problem Relation Age of Onset    Hypertension Father       Social History     Tobacco Use    Smoking status: Never    Smokeless tobacco: Never   Vaping Use    Vaping status: Never Used   Substance Use Topics    Alcohol use: Never    Drug use: Never      E-Cigarette/Vaping    E-Cigarette Use Never User       E-Cigarette/Vaping Substances      I have reviewed and agree with the history as documented.     54-year-old female returning from [facility] with mother and called by [facility] staff and informed that blood sugar was in the 50s.  Patient notes that she had eaten some fruit snacks today.  Notes she was at [facility] secondary to carcinoid tumor and received  Depo dose of Sandostatin.  No viral prodrome.  Currently feeling well.      History provided by:  Patient  Hypoglycemia - no symptoms  Initial blood sugar:  90s  Progression:  Improving  Chronicity:  New  Diabetic status:  Non-diabetic  Context: decreased oral intake    Relieved by:  Nothing  Associated symptoms: no altered mental status, no decreased responsiveness, no shortness of breath, no speech difficulty, no vomiting and no weakness     Risk factors: cancer        Review of Systems   Constitutional:  Negative for decreased responsiveness.   Respiratory:  Negative for shortness of breath.    Gastrointestinal:  Negative for vomiting.   Neurological:  Negative for speech difficulty and weakness.   All other systems reviewed and are negative.          Objective       ED Triage Vitals [11/14/24 1612]   Temperature Pulse Blood Pressure Respirations SpO2 Patient Position - Orthostatic VS   98.9 °F (37.2 °C) 83 165/54 18 96 % Lying      Temp Source Heart Rate Source BP Location FiO2 (%) Pain Score    Temporal Monitor Right arm -- No Pain      Vitals      Date and Time Temp Pulse SpO2 Resp BP Pain Score FACES Pain Rating User   11/14/24 1612 98.9 °F (37.2 °C) 83 96 % 18 165/54 No Pain -- RR            Physical Exam  Vitals and nursing note reviewed.   Constitutional:       General: She is not in acute distress.     Appearance: Normal appearance.      Comments: Pleasant, comfortable appearing, conversational, articulate   HENT:      Head: Normocephalic and atraumatic.      Right Ear: External ear normal.      Left Ear: External ear normal.      Nose: Nose normal.      Mouth/Throat:      Mouth: Mucous membranes are moist.   Eyes:      Extraocular Movements: Extraocular movements intact.      Pupils: Pupils are equal, round, and reactive to light.   Cardiovascular:      Rate and Rhythm: Normal rate and regular rhythm.      Heart sounds: No murmur heard.  Pulmonary:      Effort: Pulmonary effort is normal. No respiratory distress.      Breath sounds: Normal breath sounds. No wheezing or rales.   Abdominal:      General: Abdomen is flat. Bowel sounds are normal. There is no distension.      Tenderness: There is no abdominal tenderness. There is no guarding or rebound.   Musculoskeletal:      Cervical back: Neck supple.      Right lower leg: No edema.      Left lower leg: No edema.   Skin:     General: Skin is warm and dry.   Neurological:      General: No  focal deficit present.      Mental Status: She is alert and oriented to person, place, and time.      Cranial Nerves: No cranial nerve deficit.      Sensory: No sensory deficit.      Motor: No weakness.      Coordination: Coordination normal.   Psychiatric:         Mood and Affect: Mood normal.         Behavior: Behavior normal.         Results Reviewed       Procedure Component Value Units Date/Time    CBC and differential [[id number]]  (Abnormal) Collected: 11/14/24 1622    Lab Status: Final result Specimen: Blood from Arm, Right Updated: 11/14/24 1656     WBC 2.58 Thousand/uL      RBC 3.30 Million/uL      Hemoglobin 10.1 g/dL      Hematocrit 32.0 %      MCV 97 fL      MCH 30.6 pg      MCHC 31.6 g/dL      RDW 15.0 %      MPV 9.4 fL      Platelets 79 Thousands/uL      nRBC 0 /100 WBCs      Segmented % 66 %      Immature Grans % 0 %      Lymphocytes % 24 %      Monocytes % 7 %      Eosinophils Relative 2 %      Basophils Relative 1 %      Absolute Neutrophils 1.69 Thousands/µL      Absolute Immature Grans 0.01 Thousand/uL      Absolute Lymphocytes 0.63 Thousands/µL      Absolute Monocytes 0.17 Thousand/µL      Eosinophils Absolute 0.05 Thousand/µL      Basophils Absolute 0.03 Thousands/µL     Narrative:      This is an appended report.  These results have been appended to a previously verified report.    Comprehensive metabolic panel [[id number]]  (Abnormal) Collected: 11/14/24 1622    Lab Status: Final result Specimen: Blood from Arm, Right Updated: 11/14/24 1644     Sodium 140 mmol/L      Potassium 3.2 mmol/L      Chloride 108 mmol/L      CO2 26 mmol/L      ANION GAP 6 mmol/L      BUN 10 mg/dL      Creatinine 0.62 mg/dL      Glucose 107 mg/dL      Calcium 8.4 mg/dL      Corrected Calcium 9.0 mg/dL      AST 41 U/L      ALT 21 U/L      Alkaline Phosphatase 204 U/L      Total Protein 7.5 g/dL      Albumin 3.3 g/dL      Total Bilirubin 1.66 mg/dL      eGFR 101 ml/min/1.73sq m     Narrative:      National Kidney Disease  Foundation guidelines for Chronic Kidney Disease (CKD):     Stage 1 with normal or high GFR (GFR > 90 mL/min/1.73 square meters)    Stage 2 Mild CKD (GFR = 60-89 mL/min/1.73 square meters)    Stage 3A Moderate CKD (GFR = 45-59 mL/min/1.73 square meters)    Stage 3B Moderate CKD (GFR = 30-44 mL/min/1.73 square meters)    Stage 4 Severe CKD (GFR = 15-29 mL/min/1.73 square meters)    Stage 5 End Stage CKD (GFR <15 mL/min/1.73 square meters)  Note: GFR calculation is accurate only with a steady state creatinine    Fingerstick Glucose (POCT) [[id number]]  (Normal) Collected: 11/14/24 1612    Lab Status: Final result Specimen: Blood Updated: 11/14/24 1613     POC Glucose 97 mg/dl             No orders to display       Procedures    ED Medication and Procedure Management   Prior to Admission Medications   Prescriptions Last Dose Informant Patient Reported? Taking?   acetaminophen (TYLENOL) 325 mg tablet   No No   Sig: Take 2 tablets (650 mg total) by mouth every 6 (six) hours as needed for mild pain   folic acid (FOLVITE) 1 mg tablet   Yes No   Sig: Take 1 mg by mouth daily   gabapentin (NEURONTIN) 100 mg capsule  Self No No   Sig: Take 2 capsules (200 mg total) by mouth 3 (three) times a day   hydroxychloroquine (PLAQUENIL) 200 mg tablet   Yes No   Sig: Take 200 mg by mouth 2 (two) times a day with meals   lactulose (CHRONULAC) 10 g/15 mL solution   No No   Sig: Take 30 mL (20 g total) by mouth 4 (four) times a day Goal bowel movements is 4 a day   lidocaine (LIDODERM) 5 %   No No   Sig: Apply 1 patch topically over 12 hours daily Remove & Discard patch within 12 hours or as directed by MD   nystatin (MYCOSTATIN) powder   No No   Sig: Apply topically 2 (two) times a day   oxyCODONE (ROXICODONE) 10 MG TABS   No No   Sig: Take 1 tablet (10 mg total) by mouth every 6 (six) hours as needed for moderate pain Max Daily Amount: 40 mg   sertraline (ZOLOFT) 100 mg tablet   No No   Sig: Take 1 tablet (100 mg total) by mouth daily    spironolactone (ALDACTONE) 50 mg tablet   No No   Sig: Take 1 tablet (50 mg total) by mouth daily   torsemide (DEMADEX) 20 mg tablet   No No   Sig: Take 1 tablet (20 mg total) by mouth daily      Facility-Administered Medications: None     Patient's Medications   Discharge Prescriptions    No medications on file     No discharge procedures on file.  ED SEPSIS DOCUMENTATION   Time reflects when diagnosis was documented in both MDM as applicable and the Disposition within this note       Time User Action Codes Description Comment    11/14/2024  5:11 PM German Duckworth Add [E16.2] Hypoglycemia                  German Duckworth DO  11/14/24 1717

## 2024-11-14 NOTE — DISCHARGE INSTRUCTIONS
Frequent small meals, complex carbohydrate based, breads  Return immediately if worse or additional symptoms  Please inform your clinicians of visit and arrange follow-up in the next few days

## 2024-11-15 ENCOUNTER — OFFICE VISIT (OUTPATIENT)
Dept: NEUROSURGERY | Facility: CLINIC | Age: 54
End: 2024-11-15
Payer: COMMERCIAL

## 2024-11-15 VITALS
WEIGHT: 269 LBS | OXYGEN SATURATION: 96 % | SYSTOLIC BLOOD PRESSURE: 136 MMHG | TEMPERATURE: 98.3 F | DIASTOLIC BLOOD PRESSURE: 82 MMHG | BODY MASS INDEX: 44.82 KG/M2 | HEIGHT: 65 IN | HEART RATE: 78 BPM | RESPIRATION RATE: 14 BRPM

## 2024-11-15 DIAGNOSIS — M80.88XA PATHOLOGICAL FRACTURE OF VERTEBRA DUE TO SECONDARY OSTEOPOROSIS (HCC): ICD-10-CM

## 2024-11-15 DIAGNOSIS — M54.9 INTRACTABLE BACK PAIN: ICD-10-CM

## 2024-11-15 DIAGNOSIS — S32.10XA CLOSED FRACTURE OF SACRUM, UNSPECIFIED FRACTURE MORPHOLOGY, INITIAL ENCOUNTER (HCC): Primary | ICD-10-CM

## 2024-11-15 PROCEDURE — 99213 OFFICE O/P EST LOW 20 MIN: CPT | Performed by: RADIOLOGY

## 2024-11-15 NOTE — PROGRESS NOTES
Name: Bailey Olsen      : 1970      MRN: [id number]  Encounter Provider: Gaston Davis MD  Encounter Date: 11/15/2024   Encounter department: 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  No further follow-up is required.  I have encouraged her to complete her DEXA scan and obtain treatment for her osteoporosis.       Intractable back pain  Bailey has ongoing chronic lower back pain unrelated to her fracture.  I have recommended consultation with spine and pain management to see if any additional treatments are available.  Orders:    Ambulatory referral to Spine & Pain Management; Future        History of Present Illness   HPI  Ms. Olsen returns for follow-up of her compression fracture.  She is approximately 4 weeks status post L1 kyphoplasty.  She reports resolution of her fracture related pain.  States she has ongoing chronic lower back pain that she believes was present before her fracture.  No longer requires a walker for ambulation.  No incisional redness or swelling or drainage.  No fevers.    Review of Systems   Constitutional:  Positive for fatigue.   HENT: Negative.  Negative for trouble swallowing.    Eyes: Negative.    Respiratory:  Positive for shortness of breath. Negative for chest tightness.    Cardiovascular: Negative.    Gastrointestinal:  Positive for diarrhea.   Endocrine: Negative.    Genitourinary:  Positive for enuresis.        Reports BBI     Musculoskeletal:  Positive for back pain (mid to lower back pain lower back worse radiates to right side of ribs  radiates to both hips and groin) and gait problem (unsteady at times). Negative for myalgias.   Skin: Negative.    Allergic/Immunologic: Negative.    Neurological:  Positive for weakness (legs) and numbness (B/L  lower extremeties). Negative for seizures and light-headedness.  "  Hematological:  Does not bruise/bleed easily.   Psychiatric/Behavioral:  Positive for sleep disturbance (takes pain meds to help sleep through pain).     I have personally reviewed the MA's review of systems and made changes as necessary.         Objective   /82 (BP Location: Right arm, Patient Position: Sitting, Cuff Size: Standard)   Pulse 78   Temp 98.3 °F (36.8 °C) (Temporal)   Resp 14   Ht 5' 5\" (1.651 m)   Wt 122 kg (269 lb)   SpO2 96%   BMI 44.76 kg/m²     Physical Exam  Constitutional:       Appearance: Normal appearance.   HENT:      Head: Normocephalic.   Eyes:      Pupils: Pupils are equal, round, and reactive to light.   Pulmonary:      Effort: Pulmonary effort is normal.   Musculoskeletal:         General: Normal range of motion.   Neurological:      General: No focal deficit present.      Mental Status: She is alert and oriented to person, place, and time.      Motor: No weakness.      Gait: Gait normal.   Psychiatric:         Mood and Affect: Mood normal.         Behavior: Behavior normal.         Thought Content: Thought content normal.         Judgment: Judgment normal.       Neurologic Exam     Mental Status   Oriented to person, place, and time.     Cranial Nerves     CN III, IV, VI   Pupils are equal, round, and reactive to light.            "

## 2024-11-17 NOTE — ASSESSMENT & PLAN NOTE
Maxine is doing very well following kyphoplasty.  No further follow-up is required.  I have encouraged her to complete her DEXA scan and obtain treatment for her osteoporosis.

## 2024-11-17 NOTE — ASSESSMENT & PLAN NOTE
Bailey has ongoing chronic lower back pain unrelated to her fracture.  I have recommended consultation with spine and pain management to see if any additional treatments are available.  Orders:    Ambulatory referral to Spine & Pain Management; Future

## 2024-11-24 ENCOUNTER — HOSPITAL ENCOUNTER (INPATIENT)
Facility: HOSPITAL | Age: 54
LOS: 1 days | Discharge: HOME | DRG: 433 | End: 2024-11-26
Attending: EMERGENCY MEDICINE | Admitting: INTERNAL MEDICINE
Payer: MEDICARE

## 2024-11-24 DIAGNOSIS — Z13.9 ENCOUNTER FOR SCREENING INVOLVING SOCIAL DETERMINANTS OF HEALTH (SDOH): ICD-10-CM

## 2024-11-24 DIAGNOSIS — K76.82 HEPATIC ENCEPHALOPATHY (HCC): Primary | ICD-10-CM

## 2024-11-24 DIAGNOSIS — I50.9 CHF (CONGESTIVE HEART FAILURE) (HCC): ICD-10-CM

## 2024-11-24 LAB
ALBUMIN SERPL BCG-MCNC: 3.4 G/DL (ref 3.5–5)
ALP SERPL-CCNC: 215 U/L (ref 34–104)
ALT SERPL W P-5'-P-CCNC: 19 U/L (ref 7–52)
AMMONIA PLAS-SCNC: 156 UMOL/L (ref 18–72)
AMPHETAMINES SERPL QL SCN: NEGATIVE
ANION GAP SERPL CALCULATED.3IONS-SCNC: 4 MMOL/L (ref 4–13)
APTT PPP: 32 SECONDS (ref 23–34)
AST SERPL W P-5'-P-CCNC: 43 U/L (ref 13–39)
BARBITURATES UR QL: NEGATIVE
BASOPHILS # BLD AUTO: 0.05 THOUSANDS/ΜL (ref 0–0.1)
BASOPHILS NFR BLD AUTO: 1 % (ref 0–1)
BENZODIAZ UR QL: NEGATIVE
BILIRUB SERPL-MCNC: 1.01 MG/DL (ref 0.2–1)
BILIRUB UR QL STRIP: NEGATIVE
BUN SERPL-MCNC: 6 MG/DL (ref 5–25)
CALCIUM ALBUM COR SERPL-MCNC: 9.3 MG/DL (ref 8.3–10.1)
CALCIUM SERPL-MCNC: 8.8 MG/DL (ref 8.4–10.2)
CHLORIDE SERPL-SCNC: 105 MMOL/L (ref 96–108)
CLARITY UR: CLEAR
CO2 SERPL-SCNC: 31 MMOL/L (ref 21–32)
COCAINE UR QL: NEGATIVE
COLOR UR: YELLOW
CREAT SERPL-MCNC: 0.62 MG/DL (ref 0.6–1.3)
EOSINOPHIL # BLD AUTO: 0.09 THOUSAND/ΜL (ref 0–0.61)
EOSINOPHIL NFR BLD AUTO: 3 % (ref 0–6)
ERYTHROCYTE [DISTWIDTH] IN BLOOD BY AUTOMATED COUNT: 15.2 % (ref 11.6–15.1)
ETHANOL SERPL-MCNC: <10 MG/DL
FENTANYL UR QL SCN: NEGATIVE
FLUAV AG UPPER RESP QL IA.RAPID: NEGATIVE
FLUBV AG UPPER RESP QL IA.RAPID: NEGATIVE
GFR SERPL CREATININE-BSD FRML MDRD: 101 ML/MIN/1.73SQ M
GLUCOSE SERPL-MCNC: 85 MG/DL (ref 65–140)
GLUCOSE UR STRIP-MCNC: NEGATIVE MG/DL
HCT VFR BLD AUTO: 33.5 % (ref 34.8–46.1)
HGB BLD-MCNC: 10.4 G/DL (ref 11.5–15.4)
HGB UR QL STRIP.AUTO: NEGATIVE
HYDROCODONE UR QL SCN: NEGATIVE
IMM GRANULOCYTES # BLD AUTO: 0.02 THOUSAND/UL (ref 0–0.2)
IMM GRANULOCYTES NFR BLD AUTO: 1 % (ref 0–2)
INR PPP: 1.33 (ref 0.85–1.19)
KETONES UR STRIP-MCNC: NEGATIVE MG/DL
LACTATE SERPL-SCNC: 1.4 MMOL/L (ref 0.5–2)
LEUKOCYTE ESTERASE UR QL STRIP: NEGATIVE
LIPASE SERPL-CCNC: 14 U/L (ref 11–82)
LYMPHOCYTES # BLD AUTO: 0.82 THOUSANDS/ΜL (ref 0.6–4.47)
LYMPHOCYTES NFR BLD AUTO: 22 % (ref 14–44)
MAGNESIUM SERPL-MCNC: 1.9 MG/DL (ref 1.9–2.7)
MCH RBC QN AUTO: 30.2 PG (ref 26.8–34.3)
MCHC RBC AUTO-ENTMCNC: 31 G/DL (ref 31.4–37.4)
MCV RBC AUTO: 97 FL (ref 82–98)
METHADONE UR QL: NEGATIVE
MONOCYTES # BLD AUTO: 0.31 THOUSAND/ΜL (ref 0.17–1.22)
MONOCYTES NFR BLD AUTO: 9 % (ref 4–12)
NEUTROPHILS # BLD AUTO: 2.37 THOUSANDS/ΜL (ref 1.85–7.62)
NEUTS SEG NFR BLD AUTO: 64 % (ref 43–75)
NITRITE UR QL STRIP: NEGATIVE
NRBC BLD AUTO-RTO: 0 /100 WBCS
OPIATES UR QL SCN: NEGATIVE
OXYCODONE+OXYMORPHONE UR QL SCN: POSITIVE
PCP UR QL: NEGATIVE
PH UR STRIP.AUTO: 8 [PH]
PLATELET # BLD AUTO: 74 THOUSANDS/UL (ref 149–390)
PMV BLD AUTO: 9.5 FL (ref 8.9–12.7)
POTASSIUM SERPL-SCNC: 3.8 MMOL/L (ref 3.5–5.3)
PROT SERPL-MCNC: 7.5 G/DL (ref 6.4–8.4)
PROT UR STRIP-MCNC: NEGATIVE MG/DL
PROTHROMBIN TIME: 16.8 SECONDS (ref 12.3–15)
RBC # BLD AUTO: 3.44 MILLION/UL (ref 3.81–5.12)
SARS-COV+SARS-COV-2 AG RESP QL IA.RAPID: NEGATIVE
SODIUM SERPL-SCNC: 140 MMOL/L (ref 135–147)
SP GR UR STRIP.AUTO: 1.01 (ref 1–1.03)
THC UR QL: NEGATIVE
UROBILINOGEN UR QL STRIP.AUTO: 4 E.U./DL
WBC # BLD AUTO: 3.66 THOUSAND/UL (ref 4.31–10.16)

## 2024-11-24 PROCEDURE — 99222 1ST HOSP IP/OBS MODERATE 55: CPT | Performed by: FAMILY MEDICINE

## 2024-11-24 PROCEDURE — 87811 SARS-COV-2 COVID19 W/OPTIC: CPT | Performed by: EMERGENCY MEDICINE

## 2024-11-24 PROCEDURE — 80307 DRUG TEST PRSMV CHEM ANLYZR: CPT | Performed by: FAMILY MEDICINE

## 2024-11-24 PROCEDURE — 81003 URINALYSIS AUTO W/O SCOPE: CPT | Performed by: FAMILY MEDICINE

## 2024-11-24 PROCEDURE — 83735 ASSAY OF MAGNESIUM: CPT | Performed by: EMERGENCY MEDICINE

## 2024-11-24 PROCEDURE — 87040 BLOOD CULTURE FOR BACTERIA: CPT | Performed by: EMERGENCY MEDICINE

## 2024-11-24 PROCEDURE — 85025 COMPLETE CBC W/AUTO DIFF WBC: CPT | Performed by: EMERGENCY MEDICINE

## 2024-11-24 PROCEDURE — 80053 COMPREHEN METABOLIC PANEL: CPT | Performed by: EMERGENCY MEDICINE

## 2024-11-24 PROCEDURE — 99285 EMERGENCY DEPT VISIT HI MDM: CPT

## 2024-11-24 PROCEDURE — 87804 INFLUENZA ASSAY W/OPTIC: CPT | Performed by: EMERGENCY MEDICINE

## 2024-11-24 PROCEDURE — 85610 PROTHROMBIN TIME: CPT | Performed by: EMERGENCY MEDICINE

## 2024-11-24 PROCEDURE — 99285 EMERGENCY DEPT VISIT HI MDM: CPT | Performed by: EMERGENCY MEDICINE

## 2024-11-24 PROCEDURE — 36415 COLL VENOUS BLD VENIPUNCTURE: CPT | Performed by: EMERGENCY MEDICINE

## 2024-11-24 PROCEDURE — 82140 ASSAY OF AMMONIA: CPT | Performed by: EMERGENCY MEDICINE

## 2024-11-24 PROCEDURE — 82077 ASSAY SPEC XCP UR&BREATH IA: CPT | Performed by: EMERGENCY MEDICINE

## 2024-11-24 PROCEDURE — 85730 THROMBOPLASTIN TIME PARTIAL: CPT | Performed by: EMERGENCY MEDICINE

## 2024-11-24 PROCEDURE — 83690 ASSAY OF LIPASE: CPT | Performed by: EMERGENCY MEDICINE

## 2024-11-24 PROCEDURE — 93005 ELECTROCARDIOGRAM TRACING: CPT

## 2024-11-24 PROCEDURE — 83605 ASSAY OF LACTIC ACID: CPT | Performed by: EMERGENCY MEDICINE

## 2024-11-24 RX ORDER — PANTOPRAZOLE SODIUM 40 MG/1
40 TABLET, DELAYED RELEASE ORAL
Status: DISCONTINUED | OUTPATIENT
Start: 2024-11-25 | End: 2024-11-26 | Stop reason: HOSPADM

## 2024-11-24 RX ORDER — TORSEMIDE 20 MG/1
20 TABLET ORAL DAILY
Status: DISCONTINUED | OUTPATIENT
Start: 2024-11-25 | End: 2024-11-26 | Stop reason: HOSPADM

## 2024-11-24 RX ORDER — FOLIC ACID 1 MG/1
1 TABLET ORAL DAILY
Status: DISCONTINUED | OUTPATIENT
Start: 2024-11-25 | End: 2024-11-26 | Stop reason: HOSPADM

## 2024-11-24 RX ORDER — HYDROXYCHLOROQUINE SULFATE 200 MG/1
200 TABLET, FILM COATED ORAL 2 TIMES DAILY WITH MEALS
Status: DISCONTINUED | OUTPATIENT
Start: 2024-11-25 | End: 2024-11-26 | Stop reason: HOSPADM

## 2024-11-24 RX ORDER — LACTULOSE 10 G/15ML
30 SOLUTION ORAL ONCE
Status: DISCONTINUED | OUTPATIENT
Start: 2024-11-24 | End: 2024-11-24

## 2024-11-24 RX ORDER — LACTULOSE 10 G/15ML
30 SOLUTION ORAL 3 TIMES DAILY
Status: DISCONTINUED | OUTPATIENT
Start: 2024-11-24 | End: 2024-11-26 | Stop reason: HOSPADM

## 2024-11-24 RX ORDER — SERTRALINE HYDROCHLORIDE 100 MG/1
100 TABLET, FILM COATED ORAL DAILY
Status: DISCONTINUED | OUTPATIENT
Start: 2024-11-25 | End: 2024-11-26 | Stop reason: HOSPADM

## 2024-11-24 RX ORDER — HEPARIN SODIUM 5000 [USP'U]/ML
5000 INJECTION, SOLUTION INTRAVENOUS; SUBCUTANEOUS EVERY 8 HOURS SCHEDULED
Status: DISCONTINUED | OUTPATIENT
Start: 2024-11-24 | End: 2024-11-24

## 2024-11-24 RX ORDER — SPIRONOLACTONE 25 MG/1
50 TABLET ORAL DAILY
Status: DISCONTINUED | OUTPATIENT
Start: 2024-11-25 | End: 2024-11-26 | Stop reason: HOSPADM

## 2024-11-24 RX ADMIN — LACTULOSE 30 G: 20 SOLUTION ORAL at 22:04

## 2024-11-24 RX ADMIN — RIFAXIMIN 550 MG: 550 TABLET ORAL at 22:04

## 2024-11-25 LAB
AMMONIA PLAS-SCNC: 140 UMOL/L (ref 18–72)
ANION GAP SERPL CALCULATED.3IONS-SCNC: 3 MMOL/L (ref 4–13)
BUN SERPL-MCNC: 6 MG/DL (ref 5–25)
CALCIUM SERPL-MCNC: 8.3 MG/DL (ref 8.4–10.2)
CHLORIDE SERPL-SCNC: 107 MMOL/L (ref 96–108)
CO2 SERPL-SCNC: 29 MMOL/L (ref 21–32)
CREAT SERPL-MCNC: 0.6 MG/DL (ref 0.6–1.3)
ERYTHROCYTE [DISTWIDTH] IN BLOOD BY AUTOMATED COUNT: 15.6 % (ref 11.6–15.1)
GFR SERPL CREATININE-BSD FRML MDRD: 102 ML/MIN/1.73SQ M
GLUCOSE SERPL-MCNC: 119 MG/DL (ref 65–140)
HCT VFR BLD AUTO: 31.1 % (ref 34.8–46.1)
HGB BLD-MCNC: 9.7 G/DL (ref 11.5–15.4)
MAGNESIUM SERPL-MCNC: 2.1 MG/DL (ref 1.9–2.7)
MCH RBC QN AUTO: 30.6 PG (ref 26.8–34.3)
MCHC RBC AUTO-ENTMCNC: 31.2 G/DL (ref 31.4–37.4)
MCV RBC AUTO: 98 FL (ref 82–98)
PLATELET # BLD AUTO: 75 THOUSANDS/UL (ref 149–390)
PMV BLD AUTO: 10.5 FL (ref 8.9–12.7)
POTASSIUM SERPL-SCNC: 3.8 MMOL/L (ref 3.5–5.3)
POTASSIUM SERPL-SCNC: 5.9 MMOL/L (ref 3.5–5.3)
RBC # BLD AUTO: 3.17 MILLION/UL (ref 3.81–5.12)
SODIUM SERPL-SCNC: 139 MMOL/L (ref 135–147)
WBC # BLD AUTO: 3.06 THOUSAND/UL (ref 4.31–10.16)

## 2024-11-25 PROCEDURE — 99232 SBSQ HOSP IP/OBS MODERATE 35: CPT | Performed by: INTERNAL MEDICINE

## 2024-11-25 PROCEDURE — 84132 ASSAY OF SERUM POTASSIUM: CPT | Performed by: INTERNAL MEDICINE

## 2024-11-25 PROCEDURE — 85027 COMPLETE CBC AUTOMATED: CPT | Performed by: FAMILY MEDICINE

## 2024-11-25 PROCEDURE — 82140 ASSAY OF AMMONIA: CPT | Performed by: FAMILY MEDICINE

## 2024-11-25 PROCEDURE — 36415 COLL VENOUS BLD VENIPUNCTURE: CPT | Performed by: FAMILY MEDICINE

## 2024-11-25 PROCEDURE — 80048 BASIC METABOLIC PNL TOTAL CA: CPT | Performed by: FAMILY MEDICINE

## 2024-11-25 PROCEDURE — 83735 ASSAY OF MAGNESIUM: CPT | Performed by: FAMILY MEDICINE

## 2024-11-25 RX ORDER — LORAZEPAM 2 MG/ML
0.5 INJECTION INTRAMUSCULAR EVERY 4 HOURS PRN
Status: DISCONTINUED | OUTPATIENT
Start: 2024-11-25 | End: 2024-11-26 | Stop reason: HOSPADM

## 2024-11-25 RX ORDER — OXYCODONE HYDROCHLORIDE 5 MG/1
2.5 TABLET ORAL ONCE
Status: COMPLETED | OUTPATIENT
Start: 2024-11-25 | End: 2024-11-25

## 2024-11-25 RX ORDER — OXYCODONE AND ACETAMINOPHEN 10; 325 MG/1; MG/1
1 TABLET ORAL EVERY 6 HOURS PRN
COMMUNITY

## 2024-11-25 RX ORDER — OXYCODONE AND ACETAMINOPHEN 5; 325 MG/1; MG/1
1 TABLET ORAL EVERY 8 HOURS PRN
Refills: 0 | Status: DISCONTINUED | OUTPATIENT
Start: 2024-11-25 | End: 2024-11-26 | Stop reason: HOSPADM

## 2024-11-25 RX ORDER — LORAZEPAM 2 MG/ML
INJECTION INTRAMUSCULAR
Status: COMPLETED
Start: 2024-11-25 | End: 2024-11-25

## 2024-11-25 RX ADMIN — LORAZEPAM 0.5 MG: 2 INJECTION INTRAMUSCULAR at 01:42

## 2024-11-25 RX ADMIN — HYDROXYCHLOROQUINE SULFATE 200 MG: 200 TABLET, FILM COATED ORAL at 08:14

## 2024-11-25 RX ADMIN — OXYCODONE HYDROCHLORIDE 2.5 MG: 5 TABLET ORAL at 18:25

## 2024-11-25 RX ADMIN — SPIRONOLACTONE 50 MG: 25 TABLET ORAL at 08:14

## 2024-11-25 RX ADMIN — RIFAXIMIN 550 MG: 550 TABLET ORAL at 08:14

## 2024-11-25 RX ADMIN — TORSEMIDE 20 MG: 20 TABLET ORAL at 08:14

## 2024-11-25 RX ADMIN — PANTOPRAZOLE SODIUM 40 MG: 40 TABLET, DELAYED RELEASE ORAL at 06:08

## 2024-11-25 RX ADMIN — LACTULOSE 30 G: 20 SOLUTION ORAL at 16:01

## 2024-11-25 RX ADMIN — OXYCODONE HYDROCHLORIDE AND ACETAMINOPHEN 1 TABLET: 5; 325 TABLET ORAL at 22:06

## 2024-11-25 RX ADMIN — HYDROXYCHLOROQUINE SULFATE 200 MG: 200 TABLET, FILM COATED ORAL at 16:02

## 2024-11-25 RX ADMIN — FOLIC ACID 1 MG: 1 TABLET ORAL at 08:14

## 2024-11-25 RX ADMIN — LORAZEPAM 0.5 MG: 2 INJECTION INTRAMUSCULAR; INTRAVENOUS at 01:42

## 2024-11-25 RX ADMIN — SERTRALINE HYDROCHLORIDE 100 MG: 25 TABLET, FILM COATED ORAL at 08:14

## 2024-11-25 RX ADMIN — LACTULOSE 30 G: 20 SOLUTION ORAL at 08:42

## 2024-11-25 RX ADMIN — RIFAXIMIN 550 MG: 550 TABLET ORAL at 21:49

## 2024-11-25 RX ADMIN — LACTULOSE 30 G: 20 SOLUTION ORAL at 21:48

## 2024-11-25 NOTE — ASSESSMENT & PLAN NOTE
Ammonia level: 156  Patient has not been taking her lactulose as prescribed because it makes her nauseous.  Patient takes lactulose 4 times a day,   Increase to 30 mL 3 times daily  Added rifaximin.

## 2024-11-25 NOTE — ASSESSMENT & PLAN NOTE
Patient with chronic back pain, at home she takes oxycodone every 6 hours and gabapentin.  These medications will be on hold  PT OT evaluation will be obtained.

## 2024-11-25 NOTE — ED PROVIDER NOTES
Time reflects when diagnosis was documented in both MDM as applicable and the Disposition within this note       Time User Action Codes Description Comment    11/24/2024  8:55 PM Lainey Anderson Add [K76.82] Hepatic encephalopathy (HCC)           ED Disposition       ED Disposition   Admit    Condition   Stable    Date/Time   Angeline Nov 24, 2024  8:55 PM    Comment   Case was discussed with Dr Delgado and the patient's admission status was agreed to be Admission Status: observation status to the service of Dr. Delgado .               Assessment & Plan       Medical Decision Making  Patient presents for altered mental status, concerning for hepatic encephalopathy.  The differential includes toxic metabolic etiologies such as electrolyte disturbances, hypoglycemia and uremia, acidosis states, infection/sepsis, toxidromes of intoxication or withdrawal, hypoxemia or hypercarbia, liver disease or failure causing hepatic encephalopathy, endocrine emergencies such as hyper/hypothyroidism, adrenal insufficiency, seizure, trauma, intracranial bleeds or ischemic stroke.  History and physical as well as work-up consistent with hepatic encephalopathy.  Plan to admit for further evaluation and treatment/ discharge with outpatient follow-up.    Problems Addressed:  Hepatic encephalopathy (HCC): acute illness or injury    Amount and/or Complexity of Data Reviewed  Independent Historian: caregiver     Details: Daughter  Labs: ordered. Decision-making details documented in ED Course.    Risk  Decision regarding hospitalization.        ED Course as of 11/24/24 2209   Sun Nov 24, 2024 2208 Ammonia(!)   2208 Comprehensive metabolic panel(!)   2208 Blood culture #2   2208 Lactic acid, plasma (w/reflex if result > 2.0)   2208 Lipase   2208 Magnesium   2208 Ethanol   2208 Protime-INR(!)   2208 CBC and differential(!)   2208 APTT   2208 FLU/COVID Rapid Antigen (30 min. TAT) - Preferred screening test in ED       Medications   folic acid  (FOLVITE) tablet 1 mg (has no administration in time range)   hydroxychloroquine (PLAQUENIL) tablet 200 mg (has no administration in time range)   sertraline (ZOLOFT) tablet 100 mg (has no administration in time range)   spironolactone (ALDACTONE) tablet 50 mg (has no administration in time range)   torsemide (DEMADEX) tablet 20 mg (has no administration in time range)   rifaximin (XIFAXAN) tablet 550 mg (550 mg Oral Given 11/24/24 2204)   lactulose (CHRONULAC) oral solution 30 g (30 g Oral Given 11/24/24 2204)   pantoprazole (PROTONIX) EC tablet 40 mg (has no administration in time range)       ED Risk Strat Scores                           SBIRT 22yo+      Flowsheet Row Most Recent Value   Initial Alcohol Screen: US AUDIT-C     1. How often do you have a drink containing alcohol? 0 Filed at: 11/24/2024 2104   2. How many drinks containing alcohol do you have on a typical day you are drinking?  0 Filed at: 11/24/2024 2104   3a. Male UNDER 65: How often do you have five or more drinks on one occasion? 0 Filed at: 11/24/2024 2104   3b. FEMALE Any Age, or MALE 65+: How often do you have 4 or more drinks on one occassion? 0 Filed at: 11/24/2024 2104   Audit-C Score 0 Filed at: 11/24/2024 2104   BERNIE: How many times in the past year have you...    Used an illegal drug or used a prescription medication for non-medical reasons? Never Filed at: 11/24/2024 2104                            History of Present Illness       Chief Complaint   Patient presents with    Altered Mental Status     Pt feeling confused intermittently and dizzy x4 days. Pt has non alcoholic cirrhosis of the liver & hx of high ammonia. Pt currently taking lactulose but is not helping. Pt taking Percoset q6 for L1 back fusion in Oct.        Past Medical History:   Diagnosis Date    Back pain     Carcinoid tumor     neuroendocrine    CHF (congestive heart failure) (HCC)     Cirrhosis of liver (HCC)     Sarcoidosis       Past Surgical History:   Procedure  Laterality Date    CHOLECYSTECTOMY      GASTRIC BYPASS      HERNIA REPAIR      IR KYPHOPLASTY/VERTEBROPLASTY  10/15/2024      Family History   Problem Relation Age of Onset    Hypertension Father       Social History     Tobacco Use    Smoking status: Never    Smokeless tobacco: Never   Vaping Use    Vaping status: Never Used   Substance Use Topics    Alcohol use: Never    Drug use: Never      E-Cigarette/Vaping    E-Cigarette Use Never User       E-Cigarette/Vaping Substances      I have reviewed and agree with the history as documented.     Patient is a 54-year-old female with a history of nonalcoholic cirrhosis brought to the emergency department accompanied by daughter secondary to confusion with increased sleepiness over the past 2 to 3 days, she has had some dry heaving as well, she normally takes lactulose 4 times a day, patient reports she has been compliant with this, she is also on Percocet for chronic back pain, she denies any fevers, no cough or congestion, no chest pain or shortness of breath        Review of Systems   Constitutional:  Positive for activity change, appetite change and fatigue.   HENT: Negative.     Eyes: Negative.    Respiratory: Negative.     Cardiovascular: Negative.    Gastrointestinal:  Positive for nausea.   Endocrine: Negative.    Genitourinary: Negative.    Musculoskeletal: Negative.    Skin: Negative.    Allergic/Immunologic: Negative.    Neurological: Negative.    Hematological: Negative.    Psychiatric/Behavioral:  Positive for confusion.            Objective       ED Triage Vitals [11/24/24 1959]   Temperature Pulse Blood Pressure Respirations SpO2 Patient Position - Orthostatic VS   99.2 °F (37.3 °C) 88 149/72 16 96 % Lying      Temp Source Heart Rate Source BP Location FiO2 (%) Pain Score    Temporal Monitor Right arm -- No Pain      Vitals      Date and Time Temp Pulse SpO2 Resp BP Pain Score FACES Pain Rating User   11/24/24 1959 99.2 °F (37.3 °C) 88 96 % 16 149/72 No  Pain -- RG            Physical Exam  Constitutional:       Appearance: She is well-developed. She is obese.      Comments: Somnolent but easily awakens to verbal stimuli, slow to respond at times   HENT:      Head: Normocephalic and atraumatic.   Eyes:      Conjunctiva/sclera: Conjunctivae normal.      Pupils: Pupils are equal, round, and reactive to light.   Cardiovascular:      Rate and Rhythm: Normal rate.   Pulmonary:      Effort: Pulmonary effort is normal.   Abdominal:      Palpations: Abdomen is soft.   Musculoskeletal:         General: Normal range of motion.      Cervical back: Normal range of motion and neck supple.   Skin:     General: Skin is warm and dry.   Neurological:      Mental Status: She is alert and oriented to person, place, and time.         Results Reviewed       Procedure Component Value Units Date/Time    Lactic acid, plasma (w/reflex if result > 2.0) [[id number]]  (Normal) Collected: 11/24/24 2011    Lab Status: Final result Specimen: Blood from Arm, Left Updated: 11/24/24 2040     LACTIC ACID 1.4 mmol/L     Narrative:      Result may be elevated if tourniquet was used during collection.    Comprehensive metabolic panel [[id number]]  (Abnormal) Collected: 11/24/24 2011    Lab Status: Final result Specimen: Blood from Arm, Left Updated: 11/24/24 2040     Sodium 140 mmol/L      Potassium 3.8 mmol/L      Chloride 105 mmol/L      CO2 31 mmol/L      ANION GAP 4 mmol/L      BUN 6 mg/dL      Creatinine 0.62 mg/dL      Glucose 85 mg/dL      Calcium 8.8 mg/dL      Corrected Calcium 9.3 mg/dL      AST 43 U/L      ALT 19 U/L      Alkaline Phosphatase 215 U/L      Total Protein 7.5 g/dL      Albumin 3.4 g/dL      Total Bilirubin 1.01 mg/dL      eGFR 101 ml/min/1.73sq m     Narrative:      National Kidney Disease Foundation guidelines for Chronic Kidney Disease (CKD):     Stage 1 with normal or high GFR (GFR > 90 mL/min/1.73 square meters)    Stage 2 Mild CKD (GFR = 60-89 mL/min/1.73 square meters)     Stage 3A Moderate CKD (GFR = 45-59 mL/min/1.73 square meters)    Stage 3B Moderate CKD (GFR = 30-44 mL/min/1.73 square meters)    Stage 4 Severe CKD (GFR = 15-29 mL/min/1.73 square meters)    Stage 5 End Stage CKD (GFR <15 mL/min/1.73 square meters)  Note: GFR calculation is accurate only with a steady state creatinine    Lipase [[id number]]  (Normal) Collected: 11/24/24 2011    Lab Status: Final result Specimen: Blood from Arm, Left Updated: 11/24/24 2040     Lipase 14 u/L     Magnesium [[id number]]  (Normal) Collected: 11/24/24 2011    Lab Status: Final result Specimen: Blood from Arm, Left Updated: 11/24/24 2040     Magnesium 1.9 mg/dL     Ammonia [[id number]]  (Abnormal) Collected: 11/24/24 2011    Lab Status: Final result Specimen: Blood from Arm, Left Updated: 11/24/24 2040     Ammonia 156 umol/L     Ethanol [[id number]]  (Normal) Collected: 11/24/24 2011    Lab Status: Final result Specimen: Blood from Arm, Left Updated: 11/24/24 2039     Ethanol Lvl <10 mg/dL     Protime-INR [[id number]]  (Abnormal) Collected: 11/24/24 2011    Lab Status: Final result Specimen: Blood from Arm, Left Updated: 11/24/24 2037     Protime 16.8 seconds      INR 1.33    Narrative:      INR Therapeutic Range    Indication                                             INR Range      Atrial Fibrillation                                               2.0-3.0  Hypercoagulable State                                    2.0.2.3  Left Ventricular Asist Device                            2.0-3.0  Mechanical Heart Valve                                  -    Aortic(with afib, MI, embolism, HF, LA enlargement,    and/or coagulopathy)                                     2.0-3.0 (2.5-3.5)     Mitral                                                             2.5-3.5  Prosthetic/Bioprosthetic Heart Valve               2.0-3.0  Venous thromboembolism (VTE: VT, PE        2.0-3.0    APTT [[id number]]  (Normal) Collected: 11/24/24 2011    Lab Status: Final result  Specimen: Blood from Arm, Left Updated: 11/24/24 2037     PTT 32 seconds     CBC and differential [[id number]]  (Abnormal) Collected: 11/24/24 2011    Lab Status: Final result Specimen: Blood from Arm, Left Updated: 11/24/24 2037     WBC 3.66 Thousand/uL      RBC 3.44 Million/uL      Hemoglobin 10.4 g/dL      Hematocrit 33.5 %      MCV 97 fL      MCH 30.2 pg      MCHC 31.0 g/dL      RDW 15.2 %      MPV 9.5 fL      Platelets 74 Thousands/uL      nRBC 0 /100 WBCs      Segmented % 64 %      Immature Grans % 1 %      Lymphocytes % 22 %      Monocytes % 9 %      Eosinophils Relative 3 %      Basophils Relative 1 %      Absolute Neutrophils 2.37 Thousands/µL      Absolute Immature Grans 0.02 Thousand/uL      Absolute Lymphocytes 0.82 Thousands/µL      Absolute Monocytes 0.31 Thousand/µL      Eosinophils Absolute 0.09 Thousand/µL      Basophils Absolute 0.05 Thousands/µL     FLU/COVID Rapid Antigen (30 min. TAT) - Preferred screening test in ED [[id number]]  (Normal) Collected: 11/24/24 2011    Lab Status: Final result Specimen: Nares from Nose Updated: 11/24/24 2035     SARS COV Rapid Antigen Negative     Influenza A Rapid Antigen Negative     Influenza B Rapid Antigen Negative    Narrative:      This test has been performed using the Quidel Magdalena 2 FLU+SARS Antigen test under the Emergency Use Authorization (EUA). This test has been validated by the  and verified by the performing laboratory. The Magdalena uses lateral flow immunofluorescent sandwich assay to detect SARS-COV, Influenza A and Influenza B Antigen.     The Quidel Magdalena 2 SARS Antigen test does not differentiate between SARS-CoV and SARS-CoV-2.     Negative results are presumptive and may be confirmed with a molecular assay, if necessary, for patient management. Negative results do not rule out SARS-CoV-2 or influenza infection and should not be used as the sole basis for treatment or patient management decisions. A negative test result may occur if  the level of antigen in a sample is below the limit of detection of this test.     Positive results are indicative of the presence of viral antigens, but do not rule out bacterial infection or co-infection with other viruses.     All test results should be used as an adjunct to clinical observations and other information available to the provider.    FOR PEDIATRIC PATIENTS - copy/paste COVID Guidelines URL to browser: [email or web address]    Blood culture #2 [[id number]] Collected: 11/24/24 2025    Lab Status: In process Specimen: Blood from Arm, Right Updated: 11/24/24 2029    Blood culture #1 [[id number]] Collected: 11/24/24 2011    Lab Status: In process Specimen: Blood from Arm, Left Updated: 11/24/24 2015    UA w Reflex to Microscopic w Reflex to Culture [[id number]]     Lab Status: No result Specimen: Urine     Rapid drug screen, urine [[id number]]     Lab Status: No result Specimen: Urine             No orders to display       ECG 12 Lead Documentation Only    Date/Time: 11/24/2024 8:51 PM    Performed by: Lainey Anderson DO  Authorized by: Lainey Anderson DO    Indications / Diagnosis:  Confusion  ECG reviewed by me, the ED Provider: yes    Patient location:  ED  Previous ECG:     Comparison to cardiac monitor: Yes    Interpretation:     Interpretation: normal    Rate:     ECG rate:  84    ECG rate assessment: normal    Rhythm:     Rhythm: sinus rhythm    Ectopy:     Ectopy: none    QRS:     QRS intervals:  Normal  Conduction:     Conduction: normal    ST segments:     ST segments:  Normal  T waves:     T waves: inverted      Inverted:  III      ED Medication and Procedure Management   Prior to Admission Medications   Prescriptions Last Dose Informant Patient Reported? Taking?   acetaminophen (TYLENOL) 325 mg tablet   No No   Sig: Take 2 tablets (650 mg total) by mouth every 6 (six) hours as needed for mild pain   folic acid (FOLVITE) 1 mg tablet   Yes No    Sig: Take 1 mg by mouth daily   gabapentin (NEURONTIN) 100 mg capsule  Self No No   Sig: Take 2 capsules (200 mg total) by mouth 3 (three) times a day   hydroxychloroquine (PLAQUENIL) 200 mg tablet   Yes No   Sig: Take 200 mg by mouth 2 (two) times a day with meals   lactulose (CHRONULAC) 10 g/15 mL solution   No No   Sig: Take 30 mL (20 g total) by mouth 4 (four) times a day Goal bowel movements is 4 a day   lidocaine (LIDODERM) 5 %   No No   Sig: Apply 1 patch topically over 12 hours daily Remove & Discard patch within 12 hours or as directed by MD   nystatin (MYCOSTATIN) powder   No No   Sig: Apply topically 2 (two) times a day   oxyCODONE (ROXICODONE) 10 MG TABS   No No   Sig: Take 1 tablet (10 mg total) by mouth every 6 (six) hours as needed for moderate pain Max Daily Amount: 40 mg   sertraline (ZOLOFT) 100 mg tablet   No No   Sig: Take 1 tablet (100 mg total) by mouth daily   spironolactone (ALDACTONE) 50 mg tablet   No No   Sig: Take 1 tablet (50 mg total) by mouth daily   torsemide (DEMADEX) 20 mg tablet   No No   Sig: Take 1 tablet (20 mg total) by mouth daily      Facility-Administered Medications: None     Patient's Medications   Discharge Prescriptions    No medications on file     No discharge procedures on file.  ED SEPSIS DOCUMENTATION   Time reflects when diagnosis was documented in both MDM as applicable and the Disposition within this note       Time User Action Codes Description Comment    11/24/2024  8:55 PM Lainey Anderson Add [K76.82] Hepatic encephalopathy (HCC)                  Lainey Anderson DO  11/24/24 5366

## 2024-11-25 NOTE — CASE MANAGEMENT
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) 2024     Fall from standing 2024     Closed compression fracture of L1 lumbar vertebra, sequela 2024     Closed stable burst fracture of first lumbar vertebra with routine healing 2024     Groin hematoma 2024     Ambulatory dysfunction 2024     Intractable back pain 2024     Hyperammonemia (HCC) 2024     Hepatic encephalopathy (HCC) 2024     Fall 2024     Other cirrhosis of liver (HCC) 2024     History of malignant carcinoid tumor of small intestine 2024     Class 3 severe obesity due to excess calories in adult (HCC) 2024     Primary hypertension 2024     Anxiety 2024     Hypokalemia 2024     Fibromyalgia 2024     Iron deficiency anemia 2024     Pancytopenia (HCC) 2024     Sarcoidosis 2024     Bilateral pulmonary infiltrates 2024       LOS (days): 0  Geometric Mean LOS (GMLOS) (days):   Days to GMLOS:     OBJECTIVE:              Current admission status: Observation       Preferred Pharmacy:   [facility] [address] [place] [address] [place] - [address]  [address] [facility]   Phone: [phone] Fax: [phone]    [id number] - [place] - [address]   Phone: [phone] Fax: [phone]    Primary Care Provider: Nayely Brown  DO    Primary Insurance: MEDICARE  Secondary Insurance: [facility]    ASSESSMENT:  Active Health Care Proxies       Cristian Olsen Health Care Representative - Spouse   Primary Phone: [phone] (Mobile)                           Readmission Root Cause  30 Day Readmission: No    Patient Information  Admitted from:: Home  Mental Status: Alert  During Assessment patient was accompanied by: Not accompanied during assessment  Assessment information provided by:: Spouse  Primary Caregiver: Self  Support Systems: Spouse/significant other, Daughter, Family members  [place] [facility] Residence: [facility]  What city do you live in?: [place]  Home entry access options. Select all that apply.: Stairs  Number of steps to enter home.: 1  Type of Current Residence: 2 story home  Upon entering residence, is there a bedroom on the main floor (no further steps)?: Yes  Upon entering residence, is there a bathroom on the main floor (no further steps)?: Yes  Living Arrangements: Lives w/ Spouse/significant other, Lives w/ Daughter  Is patient a ?: No    Activities of Daily Living Prior to Admission  Functional Status: Independent  Completes ADLs independently?: Yes  Ambulates independently?: Yes  Does patient use assisted devices?: Yes  Assisted Devices (DME) used: Walker, Other (Comment) (Back brace)  Does patient currently own DME?: Yes  What DME does the patient currently own?: Walker, Other (Comment) (Back brace)  Does patient have a history of Outpatient Therapy (PT/OT)?: Yes  Does the patient have a history of Short-Term Rehab?: Yes ([facility].)  Does patient have a history of HHC?: Yes  Does patient currently have HHC?: No         Patient Information Continued  Income Source: SSI/SSD  Does patient have prescription coverage?: Yes  Does patient receive dialysis treatments?: No  Does patient have a history of substance abuse?: No  Does patient have a history of Mental Health Diagnosis?: Yes (Anxiety)  Is patient  receiving treatment for mental health?: Yes  Has patient received inpatient treatment related to mental health in the last 2 years?: No         Means of Transportation  Means of Transport to Appts:: Family transport      Social Determinants of Health (SDOH)      Flowsheet Row Most Recent Value   Housing Stability    In the last 12 months, was there a time when you were not able to pay the mortgage or rent on time? N   In the past 12 months, how many times have you moved where you were living? 0   At any time in the past 12 months, were you homeless or living in a shelter (including now)? N   Transportation Needs    In the past 12 months, has lack of transportation kept you from medical appointments or from getting medications? no   In the past 12 months, has lack of transportation kept you from meetings, work, or from getting things needed for daily living? No   Food Insecurity    Within the past 12 months, you worried that your food would run out before you got the money to buy more. Never true   Within the past 12 months, the food you bought just didn't last and you didn't have money to get more. Never true   Utilities    In the past 12 months has the electric, gas, oil, or water company threatened to shut off services in your home? No            DISCHARGE DETAILS:    Discharge planning discussed with:: Pt's spouse, Cristian, via phone call.  Freedom of Choice: Yes  Comments - Freedom of Choice: PT/OT stephanie pending.  CM contacted family/caregiver?: Yes  Were Treatment Team discharge recommendations reviewed with patient/caregiver?: Yes  Did patient/caregiver verbalize understanding of patient care needs?: Yes  Were patient/caregiver advised of the risks associated with not following Treatment Team discharge recommendations?: Yes    Contacts  Patient Contacts: Cristian Olsen- spouse  Relationship to Patient:: Family  Contact Method: Phone  Phone Number: [phone]  Reason/Outcome: Emergency Contact, Continuity of Care,  Discharge Planning    Requested Home Health Care         Is the patient interested in [facility] at discharge?: No    DME Referral Provided  Referral made for DME?: No    Other Referral/Resources/Interventions Provided:  Referral Comments: No referrals sent at this time. PT/OT evals pending.    Additional Comments: CM contacted pt's spouse via phone call and introduced self and role. Pt is confused at this time so all assessment information provided by spouse.  Pt resides with her spouse in a [place]-story [facility]. Pt is able to complete ADL's independently, but spouse will help if she needs assistance. Pt uses a walker and back brace at baseline. Pt has hx of STR at [facility] and has worked with [facility] for [facility] in past. Pt has MH dx of depression and is compliant with medication management through her PCP. Pt does not have hx of substance abuse. PT/OT evals pending at this time. CM department to remain available for any discharge planning needs throughout this admission.

## 2024-11-25 NOTE — H&P
H&P - Hospitalist   Name: Bailey Olsen 54 y.o. female I MRN: [id number]  Unit/Bed#: ED 01 I Date of Admission: 11/24/2024   Date of Service: 11/24/2024 I Hospital Day: 0     Assessment & Plan  Hepatic encephalopathy (HCC)  Ammonia level: 156  Patient has not been taking her lactulose as prescribed because it makes her nauseous.  Patient takes lactulose 4 times a day,   Increase to 30 mL 3 times daily  Added rifaximin.  Other cirrhosis of liver (HCC)  Followed by GI  Continue Aldactone and torsemide  Continue rifaximin and lactulose  Have added Protonix  Class 3 severe obesity due to excess calories in adult (HCC)  BMI: 43.95  Intractable back pain  Patient with chronic back pain, at home she takes oxycodone every 6 hours and gabapentin.  These medications will be on hold        Disposition  #1  Lactulose and rifaximin mean  #2  Labs in a.m.  #3  Held sedated medications including oxycodone and gabapentin  #4  Anticipate home in the morning      VTE Pharmacologic Prophylaxis: VTE Score: 3 Moderate Risk (Score 3-4) - Pharmacological DVT Prophylaxis Contraindicated. Sequential Compression Devices Ordered.  Code Status: Level 1 - Full Code       Anticipated Length of Stay: Patient will be admitted on an observation basis with an anticipated length of stay of less than 2 midnights secondary to hepatic encephalopathy.    History of Present Illness   Chief Complaint: Confused    Bailey Olsen is a 54 y.o. female with a PMH of cirrhosis of the liver, nonalcoholic, chronic back pain with fusion back in October, on chronic opioid therapy who presents with confusion and intermittent dizziness.  Patient states that she takes lactulose but not as prescribed as it makes her nauseous.  She does take her opioids every 6 hours around-the-clock.  Her ammonia level is 156.  Patient is very sleepy    Review of Systems   Constitutional:  Positive for fatigue.   HENT: Negative.     Respiratory: Negative.     Cardiovascular: Negative.     Gastrointestinal: Negative.    Musculoskeletal: Negative.    Skin: Negative.    Neurological:  Positive for weakness.       Historical Information   Past Medical History:   Diagnosis Date    Back pain     Carcinoid tumor     neuroendocrine    CHF (congestive heart failure) (HCC)     Cirrhosis of liver (HCC)     Sarcoidosis      Past Surgical History:   Procedure Laterality Date    CHOLECYSTECTOMY      GASTRIC BYPASS      HERNIA REPAIR      IR KYPHOPLASTY/VERTEBROPLASTY  10/15/2024     Social History     Tobacco Use    Smoking status: Never    Smokeless tobacco: Never   Vaping Use    Vaping status: Never Used   Substance and Sexual Activity    Alcohol use: Never    Drug use: Never    Sexual activity: Not Currently     E-Cigarette/Vaping    E-Cigarette Use Never User      E-Cigarette/Vaping Substances     Family history non-contributory  Social History:  Marital Status: /Civil Union   Occupation:   Patient Pre-hospital Living Situation: Home  Patient Pre-hospital Level of Mobility: walks  Patient Pre-hospital Diet Restrictions: none    Meds/Allergies   I have reviewed home medications with patient personally.  Prior to Admission medications    Medication Sig Start Date End Date Taking? Authorizing Provider   acetaminophen (TYLENOL) 325 mg tablet Take 2 tablets (650 mg total) by mouth every 6 (six) hours as needed for mild pain 10/6/24   Idalia Stuart MD   folic acid (FOLVITE) 1 mg tablet Take 1 mg by mouth daily 8/4/16 2/28/25  Historical Provider, MD   gabapentin (NEURONTIN) 100 mg capsule Take 2 capsules (200 mg total) by mouth 3 (three) times a day 8/3/24 11/15/24  Ruslan Keyes MD   hydroxychloroquine (PLAQUENIL) 200 mg tablet Take 200 mg by mouth 2 (two) times a day with meals    Historical Provider, MD   lactulose (CHRONULAC) 10 g/15 mL solution Take 30 mL (20 g total) by mouth 4 (four) times a day Goal bowel movements is 4 a day 10/6/24 11/15/24  Idalia Stuart MD   lidocaine (LIDODERM) 5 % Apply 1  patch topically over 12 hours daily Remove & Discard patch within 12 hours or as directed by MD 8/4/24   Ruslan Keyes MD   nystatin (MYCOSTATIN) powder Apply topically 2 (two) times a day 10/6/24 11/15/24  Idalia Stuart MD   oxyCODONE (ROXICODONE) 10 MG TABS Take 1 tablet (10 mg total) by mouth every 6 (six) hours as needed for moderate pain Max Daily Amount: 40 mg 8/3/24   Ruslan Keyes MD   sertraline (ZOLOFT) 100 mg tablet Take 1 tablet (100 mg total) by mouth daily 7/8/24   Mar Oliva MD   spironolactone (ALDACTONE) 50 mg tablet Take 1 tablet (50 mg total) by mouth daily 6/3/24 11/15/24  Idalia Stuart MD   torsemide (DEMADEX) 20 mg tablet Take 1 tablet (20 mg total) by mouth daily 10/6/24 11/15/24  Idalia Stuart MD     Allergies   Allergen Reactions    Zolpidem GI Intolerance, Other (See Comments) and Hives     Other Reaction(s): Other      Blacks out    Blacks out    Tapentadol Sneezing and Other (See Comments)     Zoned out    Alprazolam Other (See Comments)     Other Reaction(s): Other      Feels zoned out    Feels zoned out    Amoxicillin-Pot Clavulanate Hives and Nausea Only     Amoxil fine    Dust Mite Extract Sneezing    Morphine Headache, Nausea Only and Vomiting    Tramadol GI Intolerance    Azelastine Rash    Azelastine Hcl Rash    Molds & Smuts Rash     Other Reaction(s): Unknown       Objective :  Temp:  [99.2 °F (37.3 °C)] 99.2 °F (37.3 °C)  HR:  [88] 88  BP: (149)/(72) 149/72  Resp:  [16] 16  SpO2:  [96 %] 96 %  O2 Device: None (Room air)    Physical Exam  Constitutional:       Comments: Somnolent,   HENT:      Head: Normocephalic and atraumatic.      Mouth/Throat:      Mouth: Mucous membranes are dry.      Pharynx: Oropharynx is clear.   Eyes:      Extraocular Movements: Extraocular movements intact.      Conjunctiva/sclera: Conjunctivae normal.   Cardiovascular:      Rate and Rhythm: Normal rate and regular rhythm.      Pulses: Normal pulses.      Heart sounds: Normal heart sounds.    Pulmonary:      Comments: Decreased breath sounds  Abdominal:      Palpations: Abdomen is soft.      Tenderness: There is no abdominal tenderness. There is no guarding.   Musculoskeletal:      Right lower leg: No edema.      Left lower leg: No edema.   Skin:     General: Skin is warm and dry.   Neurological:      General: No focal deficit present.      Mental Status: She is disoriented.   Psychiatric:      Comments: Somnolent               Lab Results: I have reviewed the following results:  Results from last 7 days   Lab Units 11/24/24 2011   WBC Thousand/uL 3.66*   HEMOGLOBIN g/dL 10.4*   HEMATOCRIT % 33.5*   PLATELETS Thousands/uL 74*   SEGS PCT % 64   LYMPHO PCT % 22   MONO PCT % 9   EOS PCT % 3     Results from last 7 days   Lab Units 11/24/24 2011   SODIUM mmol/L 140   POTASSIUM mmol/L 3.8   CHLORIDE mmol/L 105   CO2 mmol/L 31   BUN mg/dL 6   CREATININE mg/dL 0.62   ANION GAP mmol/L 4   CALCIUM mg/dL 8.8   ALBUMIN g/dL 3.4*   TOTAL BILIRUBIN mg/dL 1.01*   ALK PHOS U/L 215*   ALT U/L 19   AST U/L 43*   GLUCOSE RANDOM mg/dL 85     Results from last 7 days   Lab Units 11/24/24 2011   INR  1.33*         Lab Results   Component Value Date    HGBA1C 5.9 (H) 02/27/2024    HGBA1C 5.9 08/03/2023     Results from last 7 days   Lab Units 11/24/24 2011   LACTIC ACID mmol/L 1.4       Imaging Results Review: No pertinent imaging studies reviewed.  Other Study Results Review: EKG was personally reviewed and my interpretation is: NSR. HR 84..    Administrative Statements     Medical decision making: Moderate  Diagnosis addressed: 1 acute uncomplicated illness with hepatic encephalopathy  Data:   Reviewed  CBC, CMP, ammonia level, lactic acid, INR  Ordered CBC, BMP, ammonia level  Reviewed external notes from neurosurgery  Independent interpretation of testing EKG/telemetry interpreted as normal sinus rhythm  Discussion of management with ER provider: Ordered lactulose          Risk:  Prescription drug management:  Lactulose and rifaximin  Discussion for hospitalization with ER provider: Due to hepatic encephalopathy, requires observation        ** Please Note: This note has been constructed using a voice recognition system. **

## 2024-11-25 NOTE — UTILIZATION REVIEW
Initial Clinical Review    WAS OBSERVATION 11/24/24 @ 2056 CONVERTED TO INPATIENT ADMISSION 11/25/24 @ 1441 DUE TO CONTINUED STAY REQUIRED TO CARE FOR PATIENT WITH DX: HEPATIC ENCEPHALOPATHY.    Admission: Date/Time/Statement:   Admission Orders (From admission, onward)       Ordered        11/25/24 1441  INPATIENT ADMISSION  Once            11/24/24 2056  Place in Observation  Once                          Orders Placed This Encounter   Procedures    INPATIENT ADMISSION     Standing Status:   Standing     Number of Occurrences:   1     Level of Care:   Med Surg [16]     Estimated length of stay:   More than 2 Midnights     Certification:   I certify that inpatient services are medically necessary for this patient for a duration of greater than two midnights. See H&P and MD Progress Notes for additional information about the patient's course of treatment.     ED Arrival Information       Expected   -    Arrival   11/24/2024 19:51    Acuity   Urgent              Means of arrival   Walk-In    Escorted by   Family Member    Service   Hospitalist    Admission type   Emergency              Arrival complaint   Dizziness             Chief Complaint   Patient presents with    Altered Mental Status     Pt feeling confused intermittently and dizzy x4 days. Pt has non alcoholic cirrhosis of the liver & hx of high ammonia. Pt currently taking lactulose but is not helping. Pt taking Percoset q6 for L1 back fusion in Oct.        Initial Presentation: 54 y.o. female to ED via walk-in from home  Present to ED with  confusion and intermittent dizziness.  Patient states that she takes lactulose but not as prescribed as it makes her nauseous.  She does take her opioids every 6 hours around-the-clock.   PMHX: cirrhosis of the liver, nonalcoholic, chronic back pain with fusion back in October, on chronic opioid therapy   Admitted to O[facility] with DX: Hepatic encephalopathy   on exam: disoriented. Somnolent.  ammonia level is 156. WBC 3.66;  albumin 3.4; T Bili 1.01; AST 43; INR 1.33  PLAN: 1:1 continuous watch; increase lactulose to 30 ml TID; add rifaximin; monitor labs; hold sedated medications including oxycodone and gabapentin       Anticipated Length of Stay/Certification Statement: Patient will be admitted on an observation basis with an anticipated length of stay of less than 2 midnights secondary to hepatic encephalopathy.         Date: 11/25/24 - CHANGED TO INPATIENT   Has had multiple soft bowel movements since hospitalization. Somewhat improved confusion however still slow to respond. Some slurring of speech noted. Oral intake remains poor. I/O net -3075. WBC 3.06; Heme 9.7; K 5.9; AG 3; Ca 8.3  No increased abdominal distention or ascites so we will hold off on SBP prophylaxis. No evidence of GI bleed.   Plan: pain / nausea contol (see below); monitor labs; cont 30 mL 3 times daily. Follow-up on daily MELD labs. F/u PT/ OT recom      Date: 11/26/24    Day 2 - INPATIENT   Discharged       ED Treatment-Medication Administration from 11/24/2024 1950 to 11/25/2024 0833         Date/Time Order Dose Route Action     11/25/2024 0814 folic acid (FOLVITE) tablet 1 mg 1 mg Oral Given     11/25/2024 0814 hydroxychloroquine (PLAQUENIL) tablet 200 mg 200 mg Oral Given     11/25/2024 0814 sertraline (ZOLOFT) tablet 100 mg 100 mg Oral Given     11/25/2024 0814 spironolactone (ALDACTONE) tablet 50 mg 50 mg Oral Given     11/25/2024 0814 torsemide (DEMADEX) tablet 20 mg 20 mg Oral Given     11/24/2024 2204 rifaximin (XIFAXAN) tablet 550 mg 550 mg Oral Given     11/25/2024 0814 rifaximin (XIFAXAN) tablet 550 mg 550 mg Oral Given     11/24/2024 2204 lactulose (CHRONULAC) oral solution 30 g 30 g Oral Given     11/25/2024 0608 pantoprazole (PROTONIX) EC tablet 40 mg 40 mg Oral Given     11/25/2024 0142 LORazepam (ATIVAN) injection 0.5 mg 0.5 mg Intravenous Given            Scheduled Medications:  folic acid, 1 mg, Oral, Daily  hydroxychloroquine, 200 mg, Oral,  BID With Meals  lactulose, 30 g, Oral, TID  pantoprazole, 40 mg, Oral, Early Morning  rifaximin, 550 mg, Oral, Q12H MIGUEL  sertraline, 100 mg, Oral, Daily  spironolactone, 50 mg, Oral, Daily  torsemide, 20 mg, Oral, Daily      Continuous IV Infusions: none       PRN Meds:  LORazepam, 0.5 mg, Intravenous, Q4H PRN  (11/25 recd x1)   oxyCODONE (ROXICODONE) IR tablet 2.5 mg, Oral Q8H  (11/25 recd x2)  (11/26 recd x1 so far today)        ED Triage Vitals [11/24/24 1959]   Temperature Pulse Respirations Blood Pressure SpO2 Pain Score   99.2 °F (37.3 °C) 88 16 149/72 96 % No Pain     Weight (last 2 days) before discharge       Date/Time Weight    11/25/24 1317 115 (253.31)    11/24/24 1959 120 (264.11)            Vital Signs (last 3 days) before discharge       Date/Time Temp Pulse Resp BP MAP (mmHg) SpO2 O2 Device Patient Position - Orthostatic VS [place] Coma Scale Score Pain    11/26/24 1005 -- -- -- -- -- -- -- -- -- 7    11/26/24 1004 -- -- -- -- -- -- -- -- -- 7    11/26/24 0900 -- -- -- -- -- -- None (Room air) -- 15 No Pain    11/26/24 07:32:47 97.5 °F (36.4 °C) 82 17 146/79 101 93 % -- -- -- --    11/26/24 0612 -- -- -- -- -- -- -- -- -- 8    11/25/24 2300 -- -- -- -- -- -- -- -- 15 --    11/25/24 22:16:47 97.3 °F (36.3 °C) 86 18 147/80 102 95 % None (Room air) -- -- --    11/25/24 2206 -- -- -- -- -- -- -- -- -- 8    11/25/24 1825 -- -- -- -- -- -- -- -- -- 5    11/25/24 1330 -- -- -- -- -- -- None (Room air) -- -- No Pain    11/25/24 13:17:49 97.5 °F (36.4 °C) 84 18 148/81 103 93 % -- -- -- --    11/25/24 1200 -- 84 18 110/56 76 94 % None (Room air) Lying -- --    11/25/24 0842 -- -- -- -- -- -- -- -- -- No Pain    11/25/24 0800 -- 85 -- 112/55 78 92 % -- -- -- --    11/25/24 0200 -- 93 20 131/60 86 92 % None (Room air) Lying -- --    11/25/24 0145 98.7 °F (37.1 °C) 89 18 140/67 96 92 % None (Room air) Lying -- --    11/24/24 2231 -- -- -- -- -- -- -- -- 14 --    11/24/24 2220 -- 86 18 155/80 109 93 % None (Room  air) Lying -- --    11/24/24 2215 -- 89 16 116/57 80 98 % None (Room air) Lying -- --    11/24/24 2030 -- -- -- -- -- -- None (Room air) -- 14 No Pain    11/24/24 1959 99.2 °F (37.3 °C) 88 16 149/72 -- 96 % None (Room air) Lying -- No Pain              Pertinent Labs/Diagnostic Test Results:     Results from last 7 days   Lab Units 11/26/24 0523 11/25/24 0422 11/24/24 2011   WBC Thousand/uL 3.49* 3.06* 3.66*   HEMOGLOBIN g/dL 10.9* 9.7* 10.4*   HEMATOCRIT % 35.1 31.1* 33.5*   PLATELETS Thousands/uL 76* 75* 74*   TOTAL NEUT ABS Thousands/µL 2.01  --  2.37        Results from last 7 days   Lab Units 11/26/24 0523 11/25/24 0904 11/25/24 0422 11/24/24 2011   SODIUM mmol/L 140  --  139 140   POTASSIUM mmol/L 3.4* 3.8 5.9* 3.8   CHLORIDE mmol/L 104  --  107 105   CO2 mmol/L 30  --  29 31   ANION GAP mmol/L 6  --  3* 4   BUN mg/dL 7  --  6 6   CREATININE mg/dL 0.62  --  0.60 0.62   EGFR ml/min/1.73sq m 101  --  102 101   CALCIUM mg/dL 8.5  --  8.3* 8.8   MAGNESIUM mg/dL  --   --  2.1 1.9     Results from last 7 days   Lab Units 11/26/24 0523 11/25/24 0422 11/24/24 2011   AST U/L 46*  --  43*   ALT U/L 19  --  19   ALK PHOS U/L 211*  --  215*   TOTAL PROTEIN g/dL 7.3  --  7.5   ALBUMIN g/dL 3.1*  --  3.4*   TOTAL BILIRUBIN mg/dL 1.35*  --  1.01*   AMMONIA umol/L  --  140* 156*        Results from last 7 days   Lab Units 11/26/24 0523 11/25/24 0422 11/24/24 2011   GLUCOSE RANDOM mg/dL 116 119 85        Results from last 7 days   Lab Units 11/26/24  0523 11/24/24 2011   PROTIME seconds 17.0* 16.8*   INR  1.35* 1.33*   PTT seconds  --  32        Results from last 7 days   Lab Units 11/24/24 2011   LACTIC ACID mmol/L 1.4        Results from last 7 days   Lab Units 11/24/24 2011   LIPASE u/L 14        Results from last 7 days   Lab Units 11/24/24  2217   CLARITY UA  Clear   COLOR UA  Yellow   SPEC GRAV UA  1.015   PH UA  8.0   GLUCOSE UA mg/dl Negative   KETONES UA mg/dl Negative   BLOOD UA  Negative   PROTEIN UA  mg/dl Negative   NITRITE UA  Negative   BILIRUBIN UA  Negative   UROBILINOGEN UA E.U./dl 4.0*   LEUKOCYTES UA  Negative        Results from last 7 days   Lab Units 11/24/24 2217   AMPH/METH  Negative   BARBITURATE UR  Negative   BENZODIAZEPINE UR  Negative   COCAINE UR  Negative   METHADONE URINE  Negative   OPIATE UR  Negative   PCP UR  Negative   THC UR  Negative     Results from last 7 days   Lab Units 11/24/24 2011   ETHANOL LVL mg/dL <10          Past Medical History:   Diagnosis Date    Back pain     Carcinoid tumor     neuroendocrine    CHF (congestive heart failure) (HCC)     Cirrhosis of liver (HCC)     Sarcoidosis      Present on Admission:   Other cirrhosis of liver (HCC)   Hepatic encephalopathy (HCC)   Class 3 severe obesity due to excess calories in adult (HCC)   Intractable back pain      Admitting Diagnosis: Hepatic encephalopathy (HCC) [K76.82]  AMS (altered mental status) [R41.82]  Age/Sex: 54 y.o. female    Network Utilization Review Department  ATTENTION: Please call with any questions or concerns to [phone] and carefully listen to the prompts so that you are directed to the right person. All voicemails are confidential.   For Discharge needs, contact Care Management DC Support Team at [phone] opt. 2  Send all requests for admission clinical reviews, approved or denied determinations and any other requests to dedicated fax number below belonging to the campus where the patient is receiving treatment. List of dedicated fax numbers for the Facilities:  FACILITY NAME UR FAX NUMBER   ADMISSION DENIALS (Administrative/Medical Necessity) [phone]   DISCHARGE SUPPORT TEAM (NETWORK) [phone]   PARENT CHILD HEALTH (Maternity/NICU/Pediatrics) [phone]   [facility] [phone]   [facility] [phone]   [facility] [phone]   [facility] [phone]   [facility] [phone]   [facility] [phone]   [facility] [phone]   [facility] [phone]   [facility] [phone]   [facility] [phone]   [facility] [phone]   [facility] [phone]

## 2024-11-25 NOTE — ASSESSMENT & PLAN NOTE
Ammonia level: 156  Patient has not been taking her lactulose as prescribed because it makes her nauseous.  Patient takes lactulose 4 times a day,   Increase to 30 mL 3 times daily.  Aim for 3-4 soft stools daily.  Added rifaximin.  No increased abdominal distention or ascites so we will hold off on SBP prophylaxis.  No evidence of GI bleed.

## 2024-11-25 NOTE — ED NOTES
Pt rolling around in bed, attempting to get up. Pt self removed IV. Provider made aware of patient's increasing confusing and pulling at lines.      Celia Hernandez RN  11/25/24 1560

## 2024-11-25 NOTE — ASSESSMENT & PLAN NOTE
Followed by GI  Continue Aldactone and torsemide  Continue rifaximin and lactulose  Have added Protonix  Follow-up on daily MELD labs.

## 2024-11-25 NOTE — QUICK NOTE
Pt requesting pain medications. Pt noted to be sleeping and had to be woken up for medication. While scanning pain medications pt noted to be drifting back to sleep.

## 2024-11-25 NOTE — PLAN OF CARE
Problem: Potential for Falls  Goal: Patient will remain free of falls  Description: INTERVENTIONS:  - Educate patient/family on patient safety including physical limitations  - Instruct patient to call for assistance with activity   - Consult OT/PT to assist with strengthening/mobility   - Keep Call bell within reach  - Keep bed low and locked with side rails adjusted as appropriate  - Keep care items and personal belongings within reach  - Initiate and maintain comfort rounds  - Make Fall Risk Sign visible to staff  - Offer Toileting every . Hours, in advance of need  - Initiate/Maintain .alarm  - Obtain necessary fall risk management equipment: ..  - Apply yellow socks and bracelet for high fall risk patients  - Consider moving patient to room near nurses station  Outcome: Progressing     Problem: PAIN - ADULT  Goal: Verbalizes/displays adequate comfort level or baseline comfort level  Description: Interventions:  - Encourage patient to monitor pain and request assistance  - Assess pain using appropriate pain scale  - Administer analgesics based on type and severity of pain and evaluate response  - Implement non-pharmacological measures as appropriate and evaluate response  - Consider cultural and social influences on pain and pain management  - Notify physician/advanced practitioner if interventions unsuccessful or patient reports new pain  Outcome: Progressing

## 2024-11-25 NOTE — PROGRESS NOTES
Progress Note - Hospitalist   Name: Bailey Olsen 54 y.o. female I MRN: [id number]  Unit/Bed#: [facility]-01 I Date of Admission: 11/24/2024   Date of Service: 11/25/2024 I Hospital Day: 0    Assessment & Plan  Hepatic encephalopathy (HCC)  Ammonia level: 156  Patient has not been taking her lactulose as prescribed because it makes her nauseous.  Patient takes lactulose 4 times a day,   Increase to 30 mL 3 times daily.  Aim for 3-4 soft stools daily.  Added rifaximin.  No increased abdominal distention or ascites so we will hold off on SBP prophylaxis.  No evidence of GI bleed.  Other cirrhosis of liver (HCC)  Followed by GI  Continue Aldactone and torsemide  Continue rifaximin and lactulose  Have added Protonix  Follow-up on daily MELD labs.  Class 3 severe obesity due to excess calories in adult (HCC)  BMI: 43.95  Intractable back pain  Patient with chronic back pain, at home she takes oxycodone every 6 hours and gabapentin.  These medications will be on hold  PT OT evaluation will be obtained.    VTE Pharmacologic Prophylaxis: VTE Score: 3 High Risk (Score >/= 5) - Pharmacological DVT Prophylaxis Ordered: enoxaparin (Lovenox). Sequential Compression Devices Ordered.    Mobility:   Basic Mobility Inpatient Raw Score: 13  JH-HLM Goal: 4: Move to chair/commode  JH-HLM Achieved: 2: Bed activities/Dependent transfer  JH-HLM Goal achieved. Continue to encourage appropriate mobility.    Patient Centered Rounds: I performed bedside rounds with nursing staff today.   Discussions with Specialists or Other Care Team Provider: Discussed with nursing    Education and Discussions with Family / Patient: Updated  () via phone.    Current Length of Stay: 0 day(s)  Current Patient Status: Inpatient   Certification Statement: The patient will continue to require additional inpatient hospital stay due to ongoing management of hepatic encephalopathy.  Discharge Plan: Anticipate discharge in 24-48 hrs to  home.    Code Status: Level 1 - Full Code    Subjective   Seen and examined at bedside.  Has had multiple soft bowel movements since hospitalization.  Somewhat improved confusion however still slow to respond.  Some slurring of speech noted.  Denies any nausea vomiting.  Oral intake remains poor.    Objective :  Temp:  [97.5 °F (36.4 °C)-99.2 °F (37.3 °C)] 97.5 °F (36.4 °C)  HR:  [84-93] 84  BP: (110-155)/(55-81) 148/81  Resp:  [16-20] 18  SpO2:  [92 %-98 %] 93 %  O2 Device: None (Room air)    Body mass index is 42.15 kg/m².     Input and Output Summary (last 24 hours):     Intake/Output Summary (Last 24 hours) at 11/25/2024 1443  Last data filed at 11/25/2024 1215  Gross per 24 hour   Intake --   Output 3075 ml   Net -3075 ml       Physical Exam  Constitutional:       Appearance: She is ill-appearing.   HENT:      Head: Normocephalic.      Mouth/Throat:      Mouth: Mucous membranes are moist.   Eyes:      Extraocular Movements: Extraocular movements intact.      Pupils: Pupils are equal, round, and reactive to light.   Cardiovascular:      Rate and Rhythm: Normal rate and regular rhythm.   Pulmonary:      Effort: Pulmonary effort is normal.      Breath sounds: Normal breath sounds.   Abdominal:      General: There is distension.      Palpations: Abdomen is soft.      Tenderness: There is no abdominal tenderness. There is no guarding.   Musculoskeletal:      Cervical back: Neck supple.      Right lower leg: No edema.      Left lower leg: No edema.   Neurological:      Mental Status: She is alert.      Comments: Oriented to self and place but slow to respond  Nonfocal exam  Mild asterixis           Lines/Drains:              Lab Results: I have reviewed the following results:   Results from last 7 days   Lab Units 11/25/24  0422 11/24/24 2011   WBC Thousand/uL 3.06* 3.66*   HEMOGLOBIN g/dL 9.7* 10.4*   HEMATOCRIT % 31.1* 33.5*   PLATELETS Thousands/uL 75* 74*   SEGS PCT %  --  64   LYMPHO PCT %  --  22   MONO PCT %   --  9   EOS PCT %  --  3     Results from last 7 days   Lab Units 11/25/24  0904 11/25/24  0422 11/24/24 2011   SODIUM mmol/L  --  139 140   POTASSIUM mmol/L 3.8 5.9* 3.8   CHLORIDE mmol/L  --  107 105   CO2 mmol/L  --  29 31   BUN mg/dL  --  6 6   CREATININE mg/dL  --  0.60 0.62   ANION GAP mmol/L  --  3* 4   CALCIUM mg/dL  --  8.3* 8.8   ALBUMIN g/dL  --   --  3.4*   TOTAL BILIRUBIN mg/dL  --   --  1.01*   ALK PHOS U/L  --   --  215*   ALT U/L  --   --  19   AST U/L  --   --  43*   GLUCOSE RANDOM mg/dL  --  119 85     Results from last 7 days   Lab Units 11/24/24 2011   INR  1.33*             Results from last 7 days   Lab Units 11/24/24 2011   LACTIC ACID mmol/L 1.4       Recent Cultures (last 7 days):   Results from last 7 days   Lab Units 11/24/24 2025 11/24/24 2011   BLOOD CULTURE  Received in Microbiology Lab. Culture in Progress. Received in Microbiology Lab. Culture in Progress.             Last 24 Hours Medication List:     Current Facility-Administered Medications:     folic acid (FOLVITE) tablet 1 mg, Daily    hydroxychloroquine (PLAQUENIL) tablet 200 mg, BID With Meals    lactulose (CHRONULAC) oral solution 30 g, TID    LORazepam (ATIVAN) injection 0.5 mg, Q4H PRN    pantoprazole (PROTONIX) EC tablet 40 mg, Early Morning    rifaximin (XIFAXAN) tablet 550 mg, Q12H MIGUEL    sertraline (ZOLOFT) tablet 100 mg, Daily    spironolactone (ALDACTONE) tablet 50 mg, Daily    torsemide (DEMADEX) tablet 20 mg, Daily    Administrative Statements   Today, Patient Was Seen By: Mar Oliva MD      **Please Note: This note may have been constructed using a voice recognition system.**

## 2024-11-25 NOTE — ASSESSMENT & PLAN NOTE
Patient with chronic back pain, at home she takes oxycodone every 6 hours and gabapentin.  These medications will be on hold

## 2024-11-25 NOTE — ASSESSMENT & PLAN NOTE
Followed by GI  Continue Aldactone and torsemide  Continue rifaximin and lactulose  Have added Protonix

## 2024-11-25 NOTE — ED NOTES
Pt requesting something to eat at this time and some pepsi. Pt provided with turkey sandwich and pepsi.     Celia Hernandez RN  11/25/24 0142

## 2024-11-26 VITALS
TEMPERATURE: 97.5 F | RESPIRATION RATE: 17 BRPM | WEIGHT: 253.31 LBS | DIASTOLIC BLOOD PRESSURE: 79 MMHG | SYSTOLIC BLOOD PRESSURE: 146 MMHG | HEART RATE: 82 BPM | HEIGHT: 65 IN | BODY MASS INDEX: 42.2 KG/M2 | OXYGEN SATURATION: 93 %

## 2024-11-26 LAB
ALBUMIN SERPL BCG-MCNC: 3.1 G/DL (ref 3.5–5)
ALP SERPL-CCNC: 211 U/L (ref 34–104)
ALT SERPL W P-5'-P-CCNC: 19 U/L (ref 7–52)
ANION GAP SERPL CALCULATED.3IONS-SCNC: 6 MMOL/L (ref 4–13)
AST SERPL W P-5'-P-CCNC: 46 U/L (ref 13–39)
ATRIAL RATE: 84 BPM
BASOPHILS # BLD AUTO: 0.05 THOUSANDS/ΜL (ref 0–0.1)
BASOPHILS NFR BLD AUTO: 1 % (ref 0–1)
BILIRUB SERPL-MCNC: 1.35 MG/DL (ref 0.2–1)
BUN SERPL-MCNC: 7 MG/DL (ref 5–25)
CALCIUM ALBUM COR SERPL-MCNC: 9.2 MG/DL (ref 8.3–10.1)
CALCIUM SERPL-MCNC: 8.5 MG/DL (ref 8.4–10.2)
CHLORIDE SERPL-SCNC: 104 MMOL/L (ref 96–108)
CO2 SERPL-SCNC: 30 MMOL/L (ref 21–32)
CREAT SERPL-MCNC: 0.62 MG/DL (ref 0.6–1.3)
EOSINOPHIL # BLD AUTO: 0.1 THOUSAND/ΜL (ref 0–0.61)
EOSINOPHIL NFR BLD AUTO: 3 % (ref 0–6)
ERYTHROCYTE [DISTWIDTH] IN BLOOD BY AUTOMATED COUNT: 15.4 % (ref 11.6–15.1)
GFR SERPL CREATININE-BSD FRML MDRD: 101 ML/MIN/1.73SQ M
GLUCOSE SERPL-MCNC: 116 MG/DL (ref 65–140)
HCT VFR BLD AUTO: 35.1 % (ref 34.8–46.1)
HGB BLD-MCNC: 10.9 G/DL (ref 11.5–15.4)
IMM GRANULOCYTES # BLD AUTO: 0.01 THOUSAND/UL (ref 0–0.2)
IMM GRANULOCYTES NFR BLD AUTO: 0 % (ref 0–2)
INR PPP: 1.35 (ref 0.85–1.19)
LYMPHOCYTES # BLD AUTO: 1.13 THOUSANDS/ΜL (ref 0.6–4.47)
LYMPHOCYTES NFR BLD AUTO: 32 % (ref 14–44)
MCH RBC QN AUTO: 30.1 PG (ref 26.8–34.3)
MCHC RBC AUTO-ENTMCNC: 31.1 G/DL (ref 31.4–37.4)
MCV RBC AUTO: 97 FL (ref 82–98)
MONOCYTES # BLD AUTO: 0.19 THOUSAND/ΜL (ref 0.17–1.22)
MONOCYTES NFR BLD AUTO: 5 % (ref 4–12)
NEUTROPHILS # BLD AUTO: 2.01 THOUSANDS/ΜL (ref 1.85–7.62)
NEUTS SEG NFR BLD AUTO: 59 % (ref 43–75)
NRBC BLD AUTO-RTO: 0 /100 WBCS
P AXIS: 43 DEGREES
PLATELET # BLD AUTO: 76 THOUSANDS/UL (ref 149–390)
PMV BLD AUTO: 9.7 FL (ref 8.9–12.7)
POTASSIUM SERPL-SCNC: 3.4 MMOL/L (ref 3.5–5.3)
PR INTERVAL: 172 MS
PROT SERPL-MCNC: 7.3 G/DL (ref 6.4–8.4)
PROTHROMBIN TIME: 17 SECONDS (ref 12.3–15)
QRS AXIS: 6 DEGREES
QRSD INTERVAL: 90 MS
QT INTERVAL: 400 MS
QTC INTERVAL: 472 MS
RBC # BLD AUTO: 3.62 MILLION/UL (ref 3.81–5.12)
SODIUM SERPL-SCNC: 140 MMOL/L (ref 135–147)
T WAVE AXIS: 32 DEGREES
VENTRICULAR RATE: 84 BPM
WBC # BLD AUTO: 3.49 THOUSAND/UL (ref 4.31–10.16)

## 2024-11-26 PROCEDURE — 85025 COMPLETE CBC W/AUTO DIFF WBC: CPT | Performed by: INTERNAL MEDICINE

## 2024-11-26 PROCEDURE — 97535 SELF CARE MNGMENT TRAINING: CPT

## 2024-11-26 PROCEDURE — 97166 OT EVAL MOD COMPLEX 45 MIN: CPT

## 2024-11-26 PROCEDURE — 97162 PT EVAL MOD COMPLEX 30 MIN: CPT

## 2024-11-26 PROCEDURE — 80053 COMPREHEN METABOLIC PANEL: CPT | Performed by: INTERNAL MEDICINE

## 2024-11-26 PROCEDURE — 97116 GAIT TRAINING THERAPY: CPT

## 2024-11-26 PROCEDURE — 85610 PROTHROMBIN TIME: CPT | Performed by: INTERNAL MEDICINE

## 2024-11-26 RX ORDER — PANTOPRAZOLE SODIUM 40 MG/1
40 TABLET, DELAYED RELEASE ORAL
Qty: 30 TABLET | Refills: 0 | Status: SHIPPED | OUTPATIENT
Start: 2024-11-27

## 2024-11-26 RX ORDER — TORSEMIDE 20 MG/1
20 TABLET ORAL DAILY
Qty: 30 TABLET | Refills: 0 | Status: SHIPPED | OUTPATIENT
Start: 2024-11-26 | End: 2024-12-26

## 2024-11-26 RX ORDER — LACTULOSE 10 G/15ML
20 SOLUTION ORAL 4 TIMES DAILY
Qty: 3600 ML | Refills: 0 | Status: SHIPPED | OUTPATIENT
Start: 2024-11-26 | End: 2024-12-26

## 2024-11-26 RX ORDER — SPIRONOLACTONE 50 MG/1
50 TABLET, FILM COATED ORAL DAILY
Qty: 30 TABLET | Refills: 0 | Status: SHIPPED | OUTPATIENT
Start: 2024-11-26 | End: 2024-12-26

## 2024-11-26 RX ADMIN — RIFAXIMIN 550 MG: 550 TABLET ORAL at 08:42

## 2024-11-26 RX ADMIN — OXYCODONE HYDROCHLORIDE AND ACETAMINOPHEN 1 TABLET: 5; 325 TABLET ORAL at 06:12

## 2024-11-26 RX ADMIN — TORSEMIDE 20 MG: 20 TABLET ORAL at 08:42

## 2024-11-26 RX ADMIN — LACTULOSE 30 G: 20 SOLUTION ORAL at 08:41

## 2024-11-26 RX ADMIN — PANTOPRAZOLE SODIUM 40 MG: 40 TABLET, DELAYED RELEASE ORAL at 05:21

## 2024-11-26 RX ADMIN — SPIRONOLACTONE 50 MG: 25 TABLET ORAL at 08:41

## 2024-11-26 RX ADMIN — HYDROXYCHLOROQUINE SULFATE 200 MG: 200 TABLET, FILM COATED ORAL at 06:13

## 2024-11-26 RX ADMIN — SERTRALINE HYDROCHLORIDE 100 MG: 25 TABLET, FILM COATED ORAL at 08:42

## 2024-11-26 RX ADMIN — FOLIC ACID 1 MG: 1 TABLET ORAL at 08:42

## 2024-11-26 NOTE — PLAN OF CARE
Problem: OCCUPATIONAL THERAPY ADULT  Goal: Performs self-care activities at highest level of function for planned discharge setting.  See evaluation for individualized goals.  Description: Treatment Interventions: ADL retraining, Functional transfer training, Endurance training, Activityengagement, Energy conservation, Compensatory technique education, Patient/family training          See flowsheet documentation for full assessment, interventions and recommendations.   Note: Limitation: Decreased Safe judgement during ADL, Decreased high-level ADLs, Decreased self-care trans, Decreased endurance  Prognosis: Good  Assessment: Pt is a 54 y.o. female, admitted to [facility] 11/24/2024 d/t experiencing confusion and intermittent dizziness. Dx: hepatic encephalopathy. Pt with PMHx impacting their performance during ADL tasks, including: non-alcoholic liver cirrhosis, chronic back pain, chronic opioid therapy, CHF, Back fusion, sarcoidosis. Prior to admission to the hospital Pt was performing ADLs without physical assistance. IADLs with physical assistance. Functional transfers/ambulation with physical assistance outside of the home, but without assistance within her home. Cognitive status was PTA was WNL. OT order placed to assess Pt's ADLs, cognitive status, and performance during functional tasks in order to maximize safety and independence while making most appropriate d/c recommendations. PT/OT co-evaluation completed at this time d/t mobility deficits and safety concerns. Pt's clinical presentation is currently unstable/unpredictable given new onset deficits that effect Pt's occupational performance and ability to safely return to PLOF including decrease activity tolerance, decrease standing balance, decrease sitting balance, decrease performance during ADL tasks, decrease safety awareness , increased pain, decrease activity engagement, decrease performance during functional transfers, and high fall risk combined with  medical complications of edema/swelling, incontinence, and need for input for mobility technique/safety. Personal factors affecting Pt at time of initial evaluation include: step(s) to enter environment, inability to perform IADLs, inability to perform ADLs, inability to navigate community distances, and recent fall(s)/fall history. Pt will benefit from continued skilled OT services to address deficits as defined above and to maximize level independence/participation during ADLs and functional tasks to facilitate return toward PLOF and improved quality of life. From an occupational therapy standpoint, recommendation at time of d/c would be no need for continued OT services.     Rehab Resource Intensity Level, OT: No post-acute rehabilitation needs

## 2024-11-26 NOTE — PLAN OF CARE
Problem: Potential for Falls  Goal: Patient will remain free of falls  Description: INTERVENTIONS:  - Educate patient/family on patient safety including physical limitations  - Instruct patient to call for assistance with activity   - Consult OT/PT to assist with strengthening/mobility   - Keep Call bell within reach  - Keep bed low and locked with side rails adjusted as appropriate  - Keep care items and personal belongings within reach  - Initiate and maintain comfort rounds  - Make Fall Risk Sign visible to staff  - Offer Toileting every    Hours, in advance of need  - Initiate/Maintain   alarm  - Obtain necessary fall risk management equipment:     - Apply yellow socks and bracelet for high fall risk patients  - Consider moving patient to room near nurses station  11/26/2024 1239 by Debbie Angeles LPN  Outcome: Adequate for Discharge  11/26/2024 1238 by Debbie Angeles LPN  Outcome: Progressing     Problem: PAIN - ADULT  Goal: Verbalizes/displays adequate comfort level or baseline comfort level  Description: Interventions:  - Encourage patient to monitor pain and request assistance  - Assess pain using appropriate pain scale  - Administer analgesics based on type and severity of pain and evaluate response  - Implement non-pharmacological measures as appropriate and evaluate response  - Consider cultural and social influences on pain and pain management  - Notify physician/advanced practitioner if interventions unsuccessful or patient reports new pain  11/26/2024 1239 by Debbie Angeles LPN  Outcome: Adequate for Discharge  11/26/2024 1238 by Debbie Angeles LPN  Outcome: Progressing     Problem: SAFETY ADULT  Goal: Patient will remain free of falls  Description: INTERVENTIONS:  - Educate patient/family on patient safety including physical limitations  - Instruct patient to call for assistance with activity   - Consult OT/PT to assist with strengthening/mobility   - Keep Call bell within reach  - Keep bed  low and locked with side rails adjusted as appropriate  - Keep care items and personal belongings within reach  - Initiate and maintain comfort rounds  - Make Fall Risk Sign visible to staff  - Offer Toileting every    Hours, in advance of need  - Initiate/Maintain   alarm  - Obtain necessary fall risk management equipment:     - Apply yellow socks and bracelet for high fall risk patients  - Consider moving patient to room near nurses station  11/26/2024 1239 by Debbie Angeles LPN  Outcome: Adequate for Discharge  11/26/2024 1238 by Debbie Angeles LPN  Outcome: Progressing  Goal: Maintain or return to baseline ADL function  Description: INTERVENTIONS:  -  Assess patient's ability to carry out ADLs; assess patient's baseline for ADL function and identify physical deficits which impact ability to perform ADLs (bathing, care of mouth/teeth, toileting, grooming, dressing, etc.)  - Assess/evaluate cause of self-care deficits   - Assess range of motion  - Assess patient's mobility; develop plan if impaired  - Assess patient's need for assistive devices and provide as appropriate  - Encourage maximum independence but intervene and supervise when necessary  - Involve family in performance of ADLs  - Assess for home care needs following discharge   - Consider OT consult to assist with ADL evaluation and planning for discharge  - Provide patient education as appropriate  11/26/2024 1239 by Debbie Angeles LPN  Outcome: Adequate for Discharge  11/26/2024 1238 by Debbie Angeles LPN  Outcome: Progressing  Goal: Maintains/Returns to pre admission functional level  Description: INTERVENTIONS:  - Perform AM-PAC 6 Click Basic Mobility/ Daily Activity assessment daily.  - Set and communicate daily mobility goal to care team and patient/family/caregiver.   - Collaborate with rehabilitation services on mobility goals if consulted  - Perform Range of Motion    times a day.  - Reposition patient every    hours.  - Dangle patient     times a day  - Stand patient    times a day  - Ambulate patient    times a day  - Out of bed to chair    times a day   - Out of bed for meals        times a day  - Out of bed for toileting  - Record patient progress and toleration of activity level   11/26/2024 1239 by Debbie Angeles LPN  Outcome: Adequate for Discharge  11/26/2024 1238 by Debbie Angeles LPN  Outcome: Progressing     Problem: DISCHARGE PLANNING  Goal: Discharge to home or other facility with appropriate resources  Description: INTERVENTIONS:  - Identify barriers to discharge w/patient and caregiver  - Arrange for needed discharge resources and transportation as appropriate  - Identify discharge learning needs (meds, wound care, etc.)  - Arrange for interpretive services to assist at discharge as needed  - Refer to Case Management Department for coordinating discharge planning if the patient needs post-hospital services based on physician/advanced practitioner order or complex needs related to functional status, cognitive ability, or social support system  11/26/2024 1239 by Debbie Angeles LPN  Outcome: Adequate for Discharge  11/26/2024 1238 by Debbie Angeles LPN  Outcome: Progressing     Problem: Knowledge Deficit  Goal: Patient/family/caregiver demonstrates understanding of disease process, treatment plan, medications, and discharge instructions  Description: Complete learning assessment and assess knowledge base.  Interventions:  - Provide teaching at level of understanding  - Provide teaching via preferred learning methods  11/26/2024 1239 by Debbie Angeles LPN  Outcome: Adequate for Discharge  11/26/2024 1238 by Debbie Angeles LPN  Outcome: Progressing     Problem: Prexisting or High Potential for Compromised Skin Integrity  Goal: Skin integrity is maintained or improved  Description: INTERVENTIONS:  - Identify patients at risk for skin breakdown  - Assess and monitor skin integrity  - Assess and monitor nutrition and hydration  status  - Monitor labs   - Assess for incontinence   - Turn and reposition patient  - Assist with mobility/ambulation  - Relieve pressure over bony prominences  - Avoid friction and shearing  - Provide appropriate hygiene as needed including keeping skin clean and dry  - Evaluate need for skin moisturizer/barrier cream  - Collaborate with interdisciplinary team   - Patient/family teaching  - Consider wound care consult   11/26/2024 1239 by Debbie Angeles LPN  Outcome: Adequate for Discharge  11/26/2024 1238 by Debbie Angeles LPN  Outcome: Progressing

## 2024-11-26 NOTE — PLAN OF CARE
Problem: Potential for Falls  Goal: Patient will remain free of falls  Description: INTERVENTIONS:  - Educate patient/family on patient safety including physical limitations  - Instruct patient to call for assistance with activity   - Consult OT/PT to assist with strengthening/mobility   - Keep Call bell within reach  - Keep bed low and locked with side rails adjusted as appropriate  - Keep care items and personal belongings within reach  - Initiate and maintain comfort rounds  - Make Fall Risk Sign visible to staff  - Offer Toileting every 2 Hours, in advance of need  - Initiate/Maintain   alarm  - Obtain necessary fall risk management equipment:     - Apply yellow socks and bracelet for high fall risk patients  - Consider moving patient to room near nurses station  Outcome: Progressing     Problem: PAIN - ADULT  Goal: Verbalizes/displays adequate comfort level or baseline comfort level  Description: Interventions:  - Encourage patient to monitor pain and request assistance  - Assess pain using appropriate pain scale  - Administer analgesics based on type and severity of pain and evaluate response  - Implement non-pharmacological measures as appropriate and evaluate response  - Consider cultural and social influences on pain and pain management  - Notify physician/advanced practitioner if interventions unsuccessful or patient reports new pain  Outcome: Progressing     Problem: SAFETY ADULT  Goal: Patient will remain free of falls  Description: INTERVENTIONS:  - Educate patient/family on patient safety including physical limitations  - Instruct patient to call for assistance with activity   - Consult OT/PT to assist with strengthening/mobility   - Keep Call bell within reach  - Keep bed low and locked with side rails adjusted as appropriate  - Keep care items and personal belongings within reach  - Initiate and maintain comfort rounds  - Make Fall Risk Sign visible to staff  - Offer Toileting every    Hours, in  advance of need  - Initiate/Maintain   alarm  - Obtain necessary fall risk management equipment:     - Apply yellow socks and bracelet for high fall risk patients  - Consider moving patient to room near nurses station  Outcome: Progressing  Goal: Maintain or return to baseline ADL function  Description: INTERVENTIONS:  -  Assess patient's ability to carry out ADLs; assess patient's baseline for ADL function and identify physical deficits which impact ability to perform ADLs (bathing, care of mouth/teeth, toileting, grooming, dressing, etc.)  - Assess/evaluate cause of self-care deficits   - Assess range of motion  - Assess patient's mobility; develop plan if impaired  - Assess patient's need for assistive devices and provide as appropriate  - Encourage maximum independence but intervene and supervise when necessary  - Involve family in performance of ADLs  - Assess for home care needs following discharge   - Consider OT consult to assist with ADL evaluation and planning for discharge  - Provide patient education as appropriate  Outcome: Progressing  Goal: Maintains/Returns to pre admission functional level  Description: INTERVENTIONS:  - Perform AM-PAC 6 Click Basic Mobility/ Daily Activity assessment daily.  - Set and communicate daily mobility goal to care team and patient/family/caregiver.   - Collaborate with rehabilitation services on mobility goals if consulted  - Perform Range of Motion    times a day.  - Reposition patient every    hours.  - Dangle patient    times a day  - Stand patient    times a day  - Ambulate patient    times a day  - Out of bed to chair    times a day   - Out of bed for meals      times a day  - Out of bed for toileting  - Record patient progress and toleration of activity level   Outcome: Progressing     Problem: DISCHARGE PLANNING  Goal: Discharge to home or other facility with appropriate resources  Description: INTERVENTIONS:  - Identify barriers to discharge w/patient and  caregiver  - Arrange for needed discharge resources and transportation as appropriate  - Identify discharge learning needs (meds, wound care, etc.)  - Arrange for interpretive services to assist at discharge as needed  - Refer to Case Management Department for coordinating discharge planning if the patient needs post-hospital services based on physician/advanced practitioner order or complex needs related to functional status, cognitive ability, or social support system  Outcome: Progressing     Problem: Knowledge Deficit  Goal: Patient/family/caregiver demonstrates understanding of disease process, treatment plan, medications, and discharge instructions  Description: Complete learning assessment and assess knowledge base.  Interventions:  - Provide teaching at level of understanding  - Provide teaching via preferred learning methods  Outcome: Progressing     Problem: Prexisting or High Potential for Compromised Skin Integrity  Goal: Skin integrity is maintained or improved  Description: INTERVENTIONS:  - Identify patients at risk for skin breakdown  - Assess and monitor skin integrity  - Assess and monitor nutrition and hydration status  - Monitor labs   - Assess for incontinence   - Turn and reposition patient  - Assist with mobility/ambulation  - Relieve pressure over bony prominences  - Avoid friction and shearing  - Provide appropriate hygiene as needed including keeping skin clean and dry  - Evaluate need for skin moisturizer/barrier cream  - Collaborate with interdisciplinary team   - Patient/family teaching  - Consider wound care consult   Outcome: Progressing

## 2024-11-26 NOTE — ASSESSMENT & PLAN NOTE
Patient with chronic back pain, at home she takes oxycodone every 6 hours and gabapentin.  These medications held initially however will resume upon discharge   PT OT evaluation occurred with no needs in the outpatient setting

## 2024-11-26 NOTE — ASSESSMENT & PLAN NOTE
BMI: 43.95  Recommend incorporating a more whole foods plant-predominant diet along with decreasing consumption of red meats and processed foods  Per AHA guidelines, recommend moderate-vigorous intensity exercise for 30 minutes a day for 5 days a week or a total of 150 min/weekRecommend incorporating a more whole foods plant-predominant diet along with decreasing consumption of red meats and processed foods  Per AHA guidelines, recommend moderate-vigorous intensity exercise for 30 minutes a day for 5 days a week or a total of 150 min/week

## 2024-11-26 NOTE — PHYSICAL THERAPY NOTE
"   PHYSICAL THERAPY EVALUATION  NAME:  Bailey Olsen  DATE: 11/26/24    AGE:   54 y.o.  Mrn:   [id number]  ADMIT DX:  Hepatic encephalopathy (HCC) [K76.82]  AMS (altered mental status) [R41.82]    Past Medical History:   Diagnosis Date    Back pain     Carcinoid tumor     neuroendocrine    CHF (congestive heart failure) (HCC)     Cirrhosis of liver (HCC)     Sarcoidosis      Length Of Stay: 1  Performed at least 2 patient identifiers during session: Name and Birthday  PHYSICAL THERAPY EVALUATION :    11/26/24 1005   PT Last Visit   PT Visit Date 11/26/24   Note Type   Note type Evaluation   Pain Assessment   Pain Assessment Tool 0-10   Pain Score 7   Pain Location/Orientation Orientation: Lower;Location: Back   Restrictions/Precautions   Braces or Orthoses TLSO  (Pt reports wearing TLSO for comfort/protection occasionally (during housework). Reports she was told she no longer needed to wear TLSO brace after kyphoplasty.)   Other Precautions Chair Alarm;Bed Alarm;Fall Risk;Pain;Spinal precautions   Home Living   Type of Home House  (1 FABRICIO)   Home Layout Two level;Performs ADLs on one level;Able to live on main level with bedroom/bathroom   Bathroom Shower/Tub Tub/shower unit   Bathroom Toilet Raised   Bathroom Equipment Grab bars in shower;Grab bars around toilet  (armrests on commode)   Home Equipment Walker  (reports using a walker most of the time (off and on))   Additional Comments Reports living in a 2Sh with 1 FABRICIO and 1sdt floro set up. Uses RW most of the time.   Prior Function   Level of [place] Independent with ADLs;Needs assistance with functional mobility;Needs assistance with IADLS  (reports indepednent with mobility within home, someone helps her when she leaves the home)   Lives With Spouse;Daughter   Receives Help From Family   IADLs Family/Friend/Other provides transportation;Independent with medication management;Independent with meal prep   Falls in the last 6 months (S)  5 to 10  (\"a lot\" " "\"better since surgery, have not haD ANY FALLS SINCE THEN.\")   Comments Reports being independent with mobility, ADLs and IADLs. Has assistance for community mobility and transportation.   General   Additional Pertinent History Pt is s/p L1 kyphoplasty on 10/15/24. Reports no longer needs to wear TLSO brace but does at times. Recent admissions in October and September due to hepatic encephalopathy and falls resulting in compression fracture and need for TLSO brace.   Cognition   Orientation Level Oriented X4   Following Commands Follows one step commands without difficulty   Subjective   Subjective \"I want to go home.\"   RLE Assessment   RLE Assessment WFL  (4/5 except hip flexion 4-/5)   LLE Assessment   LLE Assessment WFL  (4-/5 except knee and hip 3+/5)   Coordination   Rapid Alternating Movements Intact   Light Touch   RLE Light Touch Impaired   RLE Light Touch Comments diminished distally   LLE Light Touch Impaired   LLE Light Touch Comments diminihsed distally   Bed Mobility   Supine to Sit 5  Supervision   Additional items Verbal cues   Additional Comments HOB flat without ebdrail. min verbal cues for technique for log rolling.   Transfers   Sit to Stand 5  Supervision   Additional items Increased time required   Stand to Sit 5  Supervision   Additional items Increased time required   Stand pivot   (SBA)   Additional items Increased time required;Verbal cues   Additional Comments no AD as patient declined use of AD. sit<>stand with supervision. wide DENICE. spt with SBA for balance   Ambulation/Elevation   Gait pattern Wide DENICE;Improper Weight shift;Decreased foot clearance;Short stride;Excessively slow;Step through pattern   Gait Assistance   (SBA)   Additional items Verbal cues   Assistive Device None   Distance ambulated 14'x2 without AD with wide DENICE, decreased step length, foot clearance, evidence of imbalance   Balance   Static Sitting Good   Dynamic Sitting Fair +   Static Standing Fair +   Dynamic " "Standing Fair   Ambulatory Fair -   Activity Tolerance   Activity Tolerance Patient tolerated treatment well   Medical Staff Made Aware OT, Cristian   Assessment   Prognosis Good   Problem List Decreased strength;Decreased endurance;Impaired balance;Decreased mobility;Decreased safety awareness;Obesity;Impaired sensation;Pain   Barriers to Discharge Comments fall risk, frequent admissions, falls.   Goals   Patient Goals \"Go home\"   STG Expiration Date 12/10/24   PT Treatment Day 1   Plan   Treatment/Interventions ADL retraining;Functional transfer training;LE strengthening/ROM;Elevations;Therapeutic exercise;Endurance training;Patient/family training;Equipment eval/education;Bed mobility;Gait training;Compensatory technique education;Spoke to nursing;Spoke to case management;OT   PT Frequency 2-3x/wk   Discharge Recommendation   Rehab Resource Intensity Level, PT III (Minimum Resource Intensity)  (handout provided for OPPT services, patient aware of freedom of choice)   Equipment Recommended   (cane and walker. cane provided to patient by this therapist)   Additional Comments recommend continued support and assistance from family prn   AM-PAC Basic Mobility Inpatient   Turning in Flat Bed Without Bedrails 3   Lying on Back to Sitting on Edge of Flat Bed Without Bedrails 3   Moving Bed to Chair 3   Standing Up From Chair Using Arms 3   Walk in Room 3   Climb 3-5 Stairs With Railing 3   Basic Mobility Inpatient Raw Score 18   Basic Mobility Standardized Score 41.05   [facility] Highest Level Of Mobility   -HLM Goal 6: Walk 10 steps or more   -HLM Achieved 7: Walk 25 feet or more   Dynamic Gait Index   Gait level surface  2   Change in gait speed 2   Gait with horizontal head turns  2   Gait with vertical head turns  2   Additional Treatment Session   Start Time 1020   End Time 1030   Treatment Assessment Pt tolerated session fairly well. She was able to ambulate increased distance with decreased asisstance with use " of spc and then with RW comapred to spc. She requires cues to keep RW on ground when turning and cues for improve dgait pattern. She is limtied byd ecreased strength, balance, endurance. She will cotninue to benefit from PT services to maximize LOF.   Equipment Use initiated use of spc. sit<>stand with supervision. ambulated 150'x1 with spc with close supervision with slow cary, min verbal cues for sequencing and verbal cues for inc step length. pt with slight path deviation, guarded. trialed RW. ambulated 150'x1 with RW with distant supervision with slihgt path deviaiton with head movement and slow cary. cues for inc step length. discussed RW or cane. Patient provided with cane for use prn when having assistance outside the home. Patient aware of recommendation for OPPT services for improved balance and strength. score of 8/12 with RW for 4 item DGI indicating inc fall risk. Completed 3 steps with HR and spc with close supervision, step to pattern up right and down left LE.    End of Consult   Patient Position at End of Consult Bedside chair;Bed/Chair alarm activated;All needs within reach       Pt requires PT/OT co-eval due to medical complexity, safety concerns, fall risk, significant assistance with mobility and/or cognitive-behavioral impairments.    (Please find full objective findings from PT assessment regarding body systems outlined above).     Assessment: Pt is a 54 y.o. female seen for PT evaluation s/p admission to [facility] on 11/24/2024 with Hepatic encephalopathy (HCC).  Order placed for PT services.  Upon evaluation: Pt is presenting with impaired functional mobility due to pain, decreased strength, decreased endurance, impaired balance, gait deviations, altered sensation, orthopedic restrictions, and fall risk requiring  supervision assistance for bed mobility, supervision to stand by assistance for transfers, and stand by assistance for ambulation with out AD . Pt's  clinical presentation is currently evolving given the functional mobility deficits above, especially weakness, decreased endurance, impaired balance, gait deviations, pain, decreased activity tolerance, decreased functional mobility tolerance, and decreased safety awareness, coupled with fall risks as indicated by AM-PAC 6-Clicks: 18/24 as well as hx of falls, impaired balance, polypharmacy, decreased safety awareness, limited sensation/neuropathy, and obesity and combined with medical complications of pain impacting overall mobility status, abnormal CBC, abnormal potassium values, multiple readmissions, need for input for mobility technique/safety, and hepatic encephalopathy, abnormal ammonia level .  Pt's PMHx and comorbidities that may affect physical performance and progress include: obesity and cirrhosis of liver, intractable chronic back pain requiring oxycodone every 6 hours per notes, s/p L1 kyphoplasty/vertebroplasty, neuroendocrine carcinoid tumor, CHF, sarcoidosis . Personal factors affecting pt at time of IE include: step(s) to enter environment, inability to perform IADLs, inability to perform ADLs, inability to navigate community distances, and recent fall(s)/fall history. Pt will benefit from continued skilled PT services to address deficits as defined above and to maximize level of functional mobility to facilitate return toward PLOF and improved QOL. From PT/mobility standpoint, recommendation at time of d/c would be Level III (Minimum Resource Intensity), with walker, and with cane in order to reduce fall risk and maximize pt's functional independence and consistency with mobility. Recommend trial with walker next 1-2 sessions, trial with cane next 1-2 sessions, and ther ex next 1-2 sessions.       The patient's AM-PAC Basic Mobility Inpatient Short Form Raw Score is 18. A Raw score of greater than 16 suggests the patient may benefit from discharge to home. Please also refer to the recommendation  of the Physical Therapist for safe discharge planning.       Goals: Pt will: Perform bed mobility tasks with modified Independent to reposition in bed and prepare for transfers. Pt will perform transfers with modified Independent to decrease burden of care, decrease risk for falls, and improve activity tolerance and prepare for ambulation. Pt will ambulate with RW or LRAD for >/= 250' with  modified Independent  to decrease burden of care, decrease risk for falls, improve activity tolerance, and improve gait quality and to access home environment. Pt will complete 1 step with LRAD and >/= 8 steps with unilateral handrail with modified Independent to decrease burden of care, return to home with FABRICIO, decrease risk for falls, and improve activity tolerance. Pt will participate in objective balance assessment to determine baseline fall risk. Pt demonstrate improved balance as indicated by score of 10/12 on 4 item DGI with LRAD. Pt will increase B LE strength >/= 1/2 MMT grade to facilitate functional mobility.      Dianna Lawler, PT,DPT

## 2024-11-26 NOTE — ASSESSMENT & PLAN NOTE
Followed by GI in the outpatient setting-continue outpatient follow-up as directed  Continue Aldactone and torsemide upon discharge  Continue lactulose on discharge  Have added Protonix-continue upon discharge

## 2024-11-26 NOTE — PLAN OF CARE
Problem: Potential for Falls  Goal: Patient will remain free of falls  Description: INTERVENTIONS:  - Educate patient/family on patient safety including physical limitations  - Instruct patient to call for assistance with activity   - Consult OT/PT to assist with strengthening/mobility   - Keep Call bell within reach  - Keep bed low and locked with side rails adjusted as appropriate  - Keep care items and personal belongings within reach  - Initiate and maintain comfort rounds  - Make Fall Risk Sign visible to staff  - Offer Toileting every 2 Hours, in advance of need  - Initiate/Maintain bed alarm  - Obtain necessary fall risk management equipment walker   - Apply yellow socks and bracelet for high fall risk patients  - Consider moving patient to room near nurses station  Outcome: Progressing     Problem: PAIN - ADULT  Goal: Verbalizes/displays adequate comfort level or baseline comfort level  Description: Interventions:  - Encourage patient to monitor pain and request assistance  - Assess pain using appropriate pain scale  - Administer analgesics based on type and severity of pain and evaluate response  - Implement non-pharmacological measures as appropriate and evaluate response  - Consider cultural and social influences on pain and pain management  - Notify physician/advanced practitioner if interventions unsuccessful or patient reports new pain  Outcome: Progressing     Problem: SAFETY ADULT  Goal: Patient will remain free of falls  Description: INTERVENTIONS:  - Educate patient/family on patient safety including physical limitations  - Instruct patient to call for assistance with activity   - Consult OT/PT to assist with strengthening/mobility   - Keep Call bell within reach  - Keep bed low and locked with side rails adjusted as appropriate  - Keep care items and personal belongings within reach  - Initiate and maintain comfort rounds  - Make Fall Risk Sign visible to staff  - Offer Toileting every 2 Hours,  in advance of need  - Initiate/Maintain bed alarm  - Obtain necessary fall risk management equipment walker   - Apply yellow socks and bracelet for high fall risk patients  - Consider moving patient to room near nurses station  Outcome: Progressing  Goal: Maintain or return to baseline ADL function  Description: INTERVENTIONS:  -  Assess patient's ability to carry out ADLs; assess patient's baseline for ADL function and identify physical deficits which impact ability to perform ADLs (bathing, care of mouth/teeth, toileting, grooming, dressing, etc.)  - Assess/evaluate cause of self-care deficits   - Assess range of motion  - Assess patient's mobility; develop plan if impaired  - Assess patient's need for assistive devices and provide as appropriate  - Encourage maximum independence but intervene and supervise when necessary  - Involve family in performance of ADLs  - Assess for home care needs following discharge   - Consider OT consult to assist with ADL evaluation and planning for discharge  - Provide patient education as appropriate  Outcome: Progressing  Goal: Maintains/Returns to pre admission functional level  Description: INTERVENTIONS:  - Perform AM-PAC 6 Click Basic Mobility/ Daily Activity assessment daily.  - Set and communicate daily mobility goal to care team and patient/family/caregiver.   - Collaborate with rehabilitation services on mobility goals if consulted  - Perform Range of Motion 3 times a day.  - Reposition patient every 2 hours.  - Dangle patient 3 times a day  - Stand patient 3 times a day  - Ambulate patient 3 times a day  - Out of bed to chair 3 times a day   - Out of bed for meals 3 times a day  - Out of bed for toileting  - Record patient progress and toleration of activity level   Outcome: Progressing     Problem: DISCHARGE PLANNING  Goal: Discharge to home or other facility with appropriate resources  Description: INTERVENTIONS:  - Identify barriers to discharge w/patient and  caregiver  - Arrange for needed discharge resources and transportation as appropriate  - Identify discharge learning needs (meds, wound care, etc.)  - Arrange for interpretive services to assist at discharge as needed  - Refer to Case Management Department for coordinating discharge planning if the patient needs post-hospital services based on physician/advanced practitioner order or complex needs related to functional status, cognitive ability, or social support system  Outcome: Progressing     Problem: Knowledge Deficit  Goal: Patient/family/caregiver demonstrates understanding of disease process, treatment plan, medications, and discharge instructions  Description: Complete learning assessment and assess knowledge base.  Interventions:  - Provide teaching at level of understanding  - Provide teaching via preferred learning methods  Outcome: Progressing     Problem: Prexisting or High Potential for Compromised Skin Integrity  Goal: Skin integrity is maintained or improved  Description: INTERVENTIONS:  - Identify patients at risk for skin breakdown  - Assess and monitor skin integrity  - Assess and monitor nutrition and hydration status  - Monitor labs   - Assess for incontinence   - Turn and reposition patient  - Assist with mobility/ambulation  - Relieve pressure over bony prominences  - Avoid friction and shearing  - Provide appropriate hygiene as needed including keeping skin clean and dry  - Evaluate need for skin moisturizer/barrier cream  - Collaborate with interdisciplinary team   - Patient/family teaching  - Consider wound care consult   Outcome: Progressing

## 2024-11-26 NOTE — ASSESSMENT & PLAN NOTE
Resolved  Hepatic encephalopathy present on admission secondary to noncompliance with outpatient medication regimen which is to include lactulose  Ammonia level on presentation: 156  Patient has not been taking her lactulose as prescribed because it makes her nauseous educated extensively over the course of hospitalization.  Patient takes lactulose 4 times a day, resume on discharge  Added rifaximin over hospitalization however symptoms well-controlled with lactulose in the outpatient setting when compliant  No increased abdominal distention or ascites so we will hold off on SBP prophylaxis.  No evidence of GI bleed.

## 2024-11-26 NOTE — OCCUPATIONAL THERAPY NOTE
Occupational Therapy Evaluation     Patient Name: Bailey Olsen  Today's Date: 11/26/2024  Problem List  Principal Problem:    Hepatic encephalopathy (HCC)  Active Problems:    Other cirrhosis of liver (HCC)    Class 3 severe obesity due to excess calories in adult (HCC)    Intractable back pain    Past Medical History  Past Medical History:   Diagnosis Date    Back pain     Carcinoid tumor     neuroendocrine    CHF (congestive heart failure) (HCC)     Cirrhosis of liver (HCC)     Sarcoidosis      Past Surgical History  Past Surgical History:   Procedure Laterality Date    CHOLECYSTECTOMY      GASTRIC BYPASS      HERNIA REPAIR      IR KYPHOPLASTY/VERTEBROPLASTY  10/15/2024           11/26/24 1004   OT Last Visit   OT Visit Date 11/26/24   Note Type   Note type Evaluation   Pain Assessment   Pain Assessment Tool 0-10   Pain Score 7   Pain Location/Orientation Orientation: Lower;Location: Back   Restrictions/Precautions   Braces or Orthoses   (Pt reports wearing LSO for comfort occasionally (during housework).)   Other Precautions Spinal precautions;Chair Alarm;Bed Alarm;Pain;Fall Risk   Home Living   Type of Home House   Home Layout Two level;Performs ADLs on one level;Able to live on main level with bedroom/bathroom  (First floor set-up with bedroom and full bath; 1 FABRICIO)   Bathroom Shower/Tub Tub/shower unit   Bathroom Toilet Raised   Bathroom Equipment Grab bars in shower;Grab bars around toilet  (armrests on commode)   Bathroom Accessibility Accessible via walker   Home Equipment Walker  (reports using a walker most of the time (off and on))   Prior Function   Level of [place] Independent with ADLs;Needs assistance with functional mobility;Needs assistance with IADLS  (reports indepednent with mobility within home, someone helps her when she leaves the home)   Lives With Spouse;Daughter   Receives Help From Family   IADLs Family/Friend/Other provides transportation;Independent with medication  "management;Independent with meal prep   Falls in the last 6 months 5 to 10  (\"a lot\" \"better since surgery, have not had any falls since then.\")   Comments Reports being independent with mobility, ADLs and IADLs. Has assistance for community mobility and transportation.   ADL   Where Assessed Edge of bed   UB Dressing Assistance 7  Independent   LB Dressing Assistance 7  Independent   Additional Comments Pt doffed/donned pull-up while sitting on toilet and edge of bed with no equipment.   Bed Mobility   Supine to Sit 5  Supervision   Additional items Assist x 1;Verbal cues   Additional Comments HOB flat to simulate home environment. Verbal cues for log roll technique to reduce back pain.   Transfers   Sit to Stand 5  Supervision   Stand to Sit 5  Supervision   Stand pivot   (SBA)   Functional Mobility   Functional Mobility   (SBA with no device)   Additional Comments Pt initially walked household distances with no device and SBA, then walked short community distances with rolling walker and supervision   Balance   Static Sitting Good   Dynamic Sitting Good   Static Standing Good   Dynamic Standing Fair +   Ambulatory Fair +   RUE Assessment   RUE Assessment WFL   LUE Assessment   LUE Assessment WFL   Hand Function   Gross Motor Coordination Functional   Fine Motor Coordination Functional   Hand Function Comments R hand dominant   Vision-Basic Assessment   Current Vision Wears contacts   Cognition   Overall Cognitive Status WFL   Arousal/Participation Alert;Responsive;Cooperative   Attention Within functional limits   Orientation Level Oriented X4   Memory Within functional limits   Following Commands Follows one step commands without difficulty   Assessment   Limitation Decreased Safe judgement during ADL;Decreased high-level ADLs;Decreased self-care trans;Decreased endurance   Prognosis Good   Assessment Pt is a 54 y.o. female, admitted to [facility] 11/24/2024 d/t experiencing confusion and intermittent dizziness. Dx: " hepatic encephalopathy. Pt with PMHx impacting their performance during ADL tasks, including: non-alcoholic liver cirrhosis, chronic back pain, chronic opioid therapy, CHF, Back fusion, sarcoidosis. Prior to admission to the hospital Pt was performing ADLs without physical assistance. IADLs with physical assistance. Functional transfers/ambulation with physical assistance outside of the home, but without assistance within her home. Cognitive status was PTA was WNL. OT order placed to assess Pt's ADLs, cognitive status, and performance during functional tasks in order to maximize safety and independence while making most appropriate d/c recommendations. PT/OT co-evaluation completed at this time d/t mobility deficits and safety concerns. Pt's clinical presentation is currently unstable/unpredictable given new onset deficits that effect Pt's occupational performance and ability to safely return to PLOF including decrease activity tolerance, decrease standing balance, decrease sitting balance, decrease performance during ADL tasks, decrease safety awareness , increased pain, decrease activity engagement, decrease performance during functional transfers, and high fall risk combined with medical complications of edema/swelling, incontinence, and need for input for mobility technique/safety. Personal factors affecting Pt at time of initial evaluation include: step(s) to enter environment, inability to perform IADLs, inability to perform ADLs, inability to navigate community distances, and recent fall(s)/fall history. Pt will benefit from continued skilled OT services to address deficits as defined above and to maximize level independence/participation during ADLs and functional tasks to facilitate return toward PLOF and improved quality of life. From an occupational therapy standpoint, recommendation at time of d/c would be no need for continued OT services.   Plan   Treatment Interventions ADL retraining;Functional  transfer training;Endurance training;Activityengagement;Energy conservation;Compensatory technique education;Patient/family training   Goal Expiration Date 12/10/24   OT Treatment Day 1   OT Frequency 1-2x/wk   Discharge Recommendation   Rehab Resource Intensity Level, OT No post-acute rehabilitation needs   AM-PAC Daily Activity Inpatient   Lower Body Dressing 4   Bathing 4   Toileting 4   Upper Body Dressing 4   Grooming 4   Eating 4   Daily Activity Raw Score 24   Daily Activity Standardized Score (Calc for Raw Score >=11) 57.54   AM-PAC Applied Cognition Inpatient   Following a Speech/Presentation 4   Understanding Ordinary Conversation 4   Taking Medications 3   Remembering Where Things Are Placed or Put Away 4   Remembering List of 4-5 Errands 4   Taking Care of Complicated Tasks 3   Applied Cognition Raw Score 22   Applied Cognition Standardized Score 47.83   Additional Treatment Session   Start Time 1020   End Time 1030   Treatment Assessment OT treatment began with pt sititng on toilet for continent bladder episode following a bladder incontinent episode prior to therapist arrival. Pt able to doff pull-up independently while seated on toilet, then completed toilet hygiene independently. Pt walked to sink with SPV and no device, then completed handwashing independently. Pt walked to bed with SPV and no device and donned pull-up with modified independence for extra time. Pt tolerated session well. Continue POC.   Additional Treatment Day 1   End of Consult   Education Provided Yes   Patient Position at End of Consult Bedside chair;Bed/Chair alarm activated;All needs within reach     The patient's raw score on the AM-PAC Daily Activity Inpatient Short Form is 24. A raw score of greater than or equal to 19 suggests the patient may benefit from discharge to home. Please refer to the recommendation of the Occupational Therapist for safe discharge planning.    Pt goals to be met by 12/10/2024    Pt will  demonstrate ability to complete supine<>sit @ independent in order to increase safety and independence during ADL tasks.  Pt will demonstrate ability to iain/doff socks/shoes while sitting edge of bed @ independent in order to increase safety and independence during meaningful tasks.   Pt will demonstrate ability to complete toileting tasks including CM and pericare @ independent in order to increase safety and independence during meaningful tasks.  Pt will demonstrate ability to complete EOB, chair, toilet/commode transfers @ independent in order to increase performance and participation during functional tasks.  Pt will demonstrate ability to stand for 10 minutes while maintaining good balance with use of no device for UB support PRN.  Pt will demonstrate ability to tolerate 30-35 minute OT session with no vc'ing for deep breathing or use of energy conservation techniques in order to increase activity tolerance during functional tasks.   Pt will demonstrate Good carryover of use of energy conservation/compensatory strategies during ADLs and functional tasks in order to increase safety and reduce risk for falls.   Pt will demonstrate Good attention and participation in continued evaluation of functional ambulation house hold distances in order to assist with safe d/c planning.  Pt will attend to continued cognitive assessments 100% of the time in order to provide most appropriate d/c recommendations.   Pt will follow 100% simple 2-step commands and be A&O x4 consistently with environmental cues to increase participation in functional activities.   Pt will identify 3 areas of interest/hobbies and 1 intervention on how to incorporate into daily life in order to increase interaction with environment and peers as well as increase participation in meaningful tasks.   Pt will demonstrate 100% carryover of BUE HEP in order to increase BUE MS and increase performance during functional tasks upon d/c home.    Cristian Ramachandran,  OTR/L

## 2024-11-30 LAB
BACTERIA BLD CULT: NORMAL
BACTERIA BLD CULT: NORMAL

## 2024-12-02 ENCOUNTER — APPOINTMENT (EMERGENCY)
Dept: CT IMAGING | Facility: HOSPITAL | Age: 54
End: 2024-12-02
Payer: COMMERCIAL

## 2024-12-02 ENCOUNTER — HOSPITAL ENCOUNTER (EMERGENCY)
Facility: HOSPITAL | Age: 54
Discharge: HOME | End: 2024-12-02
Attending: EMERGENCY MEDICINE
Payer: COMMERCIAL

## 2024-12-02 VITALS
OXYGEN SATURATION: 94 % | TEMPERATURE: 98.8 F | HEART RATE: 80 BPM | RESPIRATION RATE: 19 BRPM | SYSTOLIC BLOOD PRESSURE: 166 MMHG | DIASTOLIC BLOOD PRESSURE: 82 MMHG

## 2024-12-02 DIAGNOSIS — R10.9 ABDOMINAL PAIN: ICD-10-CM

## 2024-12-02 DIAGNOSIS — R11.0 NAUSEA: Primary | ICD-10-CM

## 2024-12-02 LAB
ALBUMIN SERPL BCG-MCNC: 3.1 G/DL (ref 3.5–5)
ALP SERPL-CCNC: 237 U/L (ref 34–104)
ALT SERPL W P-5'-P-CCNC: 34 U/L (ref 7–52)
AMMONIA PLAS-SCNC: 66 UMOL/L (ref 18–72)
ANION GAP SERPL CALCULATED.3IONS-SCNC: 6 MMOL/L (ref 4–13)
AST SERPL W P-5'-P-CCNC: 68 U/L (ref 13–39)
BASOPHILS # BLD AUTO: 0.04 THOUSANDS/ΜL (ref 0–0.1)
BASOPHILS NFR BLD AUTO: 1 % (ref 0–1)
BILIRUB SERPL-MCNC: 1.66 MG/DL (ref 0.2–1)
BUN SERPL-MCNC: 6 MG/DL (ref 5–25)
CALCIUM ALBUM COR SERPL-MCNC: 9.1 MG/DL (ref 8.3–10.1)
CALCIUM SERPL-MCNC: 8.4 MG/DL (ref 8.4–10.2)
CHLORIDE SERPL-SCNC: 103 MMOL/L (ref 96–108)
CO2 SERPL-SCNC: 30 MMOL/L (ref 21–32)
CREAT SERPL-MCNC: 0.65 MG/DL (ref 0.6–1.3)
EOSINOPHIL # BLD AUTO: 0.06 THOUSAND/ΜL (ref 0–0.61)
EOSINOPHIL NFR BLD AUTO: 2 % (ref 0–6)
ERYTHROCYTE [DISTWIDTH] IN BLOOD BY AUTOMATED COUNT: 15.9 % (ref 11.6–15.1)
GFR SERPL CREATININE-BSD FRML MDRD: 100 ML/MIN/1.73SQ M
GLUCOSE SERPL-MCNC: 101 MG/DL (ref 65–140)
HCT VFR BLD AUTO: 34.7 % (ref 34.8–46.1)
HGB BLD-MCNC: 11.1 G/DL (ref 11.5–15.4)
IMM GRANULOCYTES # BLD AUTO: 0 THOUSAND/UL (ref 0–0.2)
IMM GRANULOCYTES NFR BLD AUTO: 0 % (ref 0–2)
LIPASE SERPL-CCNC: 20 U/L (ref 11–82)
LYMPHOCYTES # BLD AUTO: 0.78 THOUSANDS/ΜL (ref 0.6–4.47)
LYMPHOCYTES NFR BLD AUTO: 22 % (ref 14–44)
MCH RBC QN AUTO: 31 PG (ref 26.8–34.3)
MCHC RBC AUTO-ENTMCNC: 32 G/DL (ref 31.4–37.4)
MCV RBC AUTO: 97 FL (ref 82–98)
MONOCYTES # BLD AUTO: 0.24 THOUSAND/ΜL (ref 0.17–1.22)
MONOCYTES NFR BLD AUTO: 7 % (ref 4–12)
NEUTROPHILS # BLD AUTO: 2.45 THOUSANDS/ΜL (ref 1.85–7.62)
NEUTS SEG NFR BLD AUTO: 68 % (ref 43–75)
NRBC BLD AUTO-RTO: 0 /100 WBCS
PLATELET # BLD AUTO: 72 THOUSANDS/UL (ref 149–390)
PMV BLD AUTO: 9.6 FL (ref 8.9–12.7)
POTASSIUM SERPL-SCNC: 3.7 MMOL/L (ref 3.5–5.3)
PROT SERPL-MCNC: 7.2 G/DL (ref 6.4–8.4)
RBC # BLD AUTO: 3.58 MILLION/UL (ref 3.81–5.12)
SODIUM SERPL-SCNC: 139 MMOL/L (ref 135–147)
WBC # BLD AUTO: 3.57 THOUSAND/UL (ref 4.31–10.16)

## 2024-12-02 PROCEDURE — 82140 ASSAY OF AMMONIA: CPT | Performed by: EMERGENCY MEDICINE

## 2024-12-02 PROCEDURE — 99284 EMERGENCY DEPT VISIT MOD MDM: CPT

## 2024-12-02 PROCEDURE — 96375 TX/PRO/DX INJ NEW DRUG ADDON: CPT

## 2024-12-02 PROCEDURE — 96361 HYDRATE IV INFUSION ADD-ON: CPT

## 2024-12-02 PROCEDURE — 36415 COLL VENOUS BLD VENIPUNCTURE: CPT | Performed by: EMERGENCY MEDICINE

## 2024-12-02 PROCEDURE — 96374 THER/PROPH/DIAG INJ IV PUSH: CPT

## 2024-12-02 PROCEDURE — 99285 EMERGENCY DEPT VISIT HI MDM: CPT | Performed by: EMERGENCY MEDICINE

## 2024-12-02 PROCEDURE — 96376 TX/PRO/DX INJ SAME DRUG ADON: CPT

## 2024-12-02 PROCEDURE — 83690 ASSAY OF LIPASE: CPT | Performed by: EMERGENCY MEDICINE

## 2024-12-02 PROCEDURE — 80053 COMPREHEN METABOLIC PANEL: CPT | Performed by: EMERGENCY MEDICINE

## 2024-12-02 PROCEDURE — 74177 CT ABD & PELVIS W/CONTRAST: CPT

## 2024-12-02 PROCEDURE — 85025 COMPLETE CBC W/AUTO DIFF WBC: CPT | Performed by: EMERGENCY MEDICINE

## 2024-12-02 RX ORDER — HYDROMORPHONE HCL/PF 1 MG/ML
0.5 SYRINGE (ML) INJECTION ONCE
Refills: 0 | Status: COMPLETED | OUTPATIENT
Start: 2024-12-02 | End: 2024-12-02

## 2024-12-02 RX ORDER — ONDANSETRON 2 MG/ML
4 INJECTION INTRAMUSCULAR; INTRAVENOUS ONCE
Status: COMPLETED | OUTPATIENT
Start: 2024-12-02 | End: 2024-12-02

## 2024-12-02 RX ORDER — ONDANSETRON 4 MG/1
4 TABLET, FILM COATED ORAL EVERY 6 HOURS
Qty: 20 TABLET | Refills: 0 | Status: SHIPPED | OUTPATIENT
Start: 2024-12-02

## 2024-12-02 RX ADMIN — ONDANSETRON 4 MG: 2 INJECTION INTRAMUSCULAR; INTRAVENOUS at 17:50

## 2024-12-02 RX ADMIN — HYDROMORPHONE HYDROCHLORIDE 0.5 MG: 1 INJECTION, SOLUTION INTRAMUSCULAR; INTRAVENOUS; SUBCUTANEOUS at 17:56

## 2024-12-02 RX ADMIN — HYDROMORPHONE HYDROCHLORIDE 0.5 MG: 1 INJECTION, SOLUTION INTRAMUSCULAR; INTRAVENOUS; SUBCUTANEOUS at 20:09

## 2024-12-02 RX ADMIN — SODIUM CHLORIDE 1000 ML: 0.9 INJECTION, SOLUTION INTRAVENOUS at 17:52

## 2024-12-02 RX ADMIN — IOHEXOL 100 ML: 350 INJECTION, SOLUTION INTRAVENOUS at 18:34

## 2024-12-02 NOTE — ED PROVIDER NOTES
Time reflects when diagnosis was documented in both MDM as applicable and the Disposition within this note       Time User Action Codes Description Comment    12/2/2024  7:46 PM Arabella Mei Add [R11.0] Nausea     12/2/2024  7:46 PM Arabella Mei Add [R10.9] Abdominal pain           ED Disposition       ED Disposition   Discharge    Condition   Stable    Date/Time   Mon Dec 2, 2024  7:46 PM    Comment   Bailey Olsen discharge to home/self care.                   Assessment & Plan       Medical Decision Making  Differential diagnosis includes but not limited to: Bowel obstruction, mesenteric ischemia, gastritis, diverticulitis    Amount and/or Complexity of Data Reviewed  Labs: ordered.  Radiology: ordered.    Risk  Prescription drug management.        ED Course as of 12/02/24 2117   Mon Dec 02, 2024   1947 Patient had no acute findings on the workup in the ED.  She has minimally elevated AST compared to her baseline and her labs appear to be consistent with previous findings.  She is being advised to follow-up outpatient with her GI doctor.   2057 Ammonia level appears to be normal.  Patient had outpatient lab test done at [facility] on Saturday.  Compared to Saturday her LFTs are showing improvement.  She is stable for discharge.       Medications   sodium chloride 0.9 % bolus 1,000 mL (0 mL Intravenous Stopped 12/2/24 1923)   ondansetron (ZOFRAN) injection 4 mg (4 mg Intravenous Given 12/2/24 1750)   HYDROmorphone (DILAUDID) injection 0.5 mg (0.5 mg Intravenous Given 12/2/24 1756)   iohexol (OMNIPAQUE) 350 MG/ML injection (MULTI-DOSE) 100 mL (100 mL Intravenous Given 12/2/24 1834)   HYDROmorphone (DILAUDID) injection 0.5 mg (0.5 mg Intravenous Given 12/2/24 2009)       ED Risk Strat Scores                           SBIRT 22yo+      Flowsheet Row Most Recent Value   Initial Alcohol Screen: US AUDIT-C     1. How often do you have a drink containing alcohol? 0 Filed at: 12/02/2024 2023   2. How many drinks  containing alcohol do you have on a typical day you are drinking?  0 Filed at: 12/02/2024 2023   3b. FEMALE Any Age, or MALE 65+: How often do you have 4 or more drinks on one occassion? 0 Filed at: 12/02/2024 2023   Audit-C Score 0 Filed at: 12/02/2024 2023   BERNIE: How many times in the past year have you...    Used an illegal drug or used a prescription medication for non-medical reasons? Never Filed at: 12/02/2024 2023                            History of Present Illness       Chief Complaint   Patient presents with    Nausea     States she has severe nausea and generally just doesn't feel well for the last four days        Past Medical History:   Diagnosis Date    Back pain     Carcinoid tumor     neuroendocrine    CHF (congestive heart failure) (HCC)     Cirrhosis of liver (HCC)     Sarcoidosis       Past Surgical History:   Procedure Laterality Date    CHOLECYSTECTOMY      GASTRIC BYPASS      HERNIA REPAIR      IR KYPHOPLASTY/VERTEBROPLASTY  10/15/2024      Family History   Problem Relation Age of Onset    Hypertension Father       Social History     Tobacco Use    Smoking status: Never    Smokeless tobacco: Never   Vaping Use    Vaping status: Never Used   Substance Use Topics    Alcohol use: Never    Drug use: Never      E-Cigarette/Vaping    E-Cigarette Use Never User       E-Cigarette/Vaping Substances      I have reviewed and agree with the history as documented.     This is a 54-year-old female presenting to the ED for evaluation of severe nausea with diffuse abdominal pain for the last 4 days.  Patient states that she has a history of liver cirrhosis and usually when she feels this way her enzymes are elevated.  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  She denies any diarrhea or vomiting and states that she feels weak.  She does admit that she is able to keep fluids down.        Review of Systems   Constitutional:  Positive  for fatigue. Negative for chills and fever.   HENT:  Negative for ear pain and sore throat.    Eyes:  Negative for pain and visual disturbance.   Respiratory:  Negative for cough and shortness of breath.    Cardiovascular:  Negative for chest pain and palpitations.   Gastrointestinal:  Positive for abdominal pain and nausea. Negative for abdominal distention and vomiting.   Genitourinary:  Negative for dysuria and hematuria.   Musculoskeletal:  Negative for arthralgias and back pain.   Skin:  Negative for color change and rash.   Neurological:  Negative for seizures and syncope.   All other systems reviewed and are negative.          Objective       ED Triage Vitals [12/02/24 1632]   Temperature Pulse Blood Pressure Respirations SpO2 Patient Position - Orthostatic VS   98.8 °F (37.1 °C) 76 (!) 179/77 18 93 % Sitting      Temp Source Heart Rate Source BP Location FiO2 (%) Pain Score    Temporal Monitor Right arm -- 8      Vitals      Date and Time Temp Pulse SpO2 Resp BP Pain Score FACES Pain Rating User   12/02/24 2009 -- 80 94 % -- 166/82 7 -- CG   12/02/24 1900 -- 84 99 % -- 179/80 -- -- CG   12/02/24 1756 -- -- -- -- -- 7 -- KK   12/02/24 1645 -- 80 96 % 19 170/77 -- -- PK   12/02/24 1632 98.8 °F (37.1 °C) 76 93 % 18 179/77 8 --             Physical Exam  Vitals and nursing note reviewed.   Constitutional:       General: She is not in acute distress.     Appearance: Normal appearance. She is well-developed. She is obese.   HENT:      Head: Normocephalic and atraumatic.   Eyes:      Conjunctiva/sclera: Conjunctivae normal.   Cardiovascular:      Rate and Rhythm: Normal rate and regular rhythm.      Pulses: Normal pulses.      Heart sounds: No murmur heard.  Pulmonary:      Effort: Pulmonary effort is normal. No respiratory distress.      Breath sounds: Normal breath sounds.   Abdominal:      General: Abdomen is flat. Bowel sounds are normal. There is no distension.      Palpations: Abdomen is soft.       Tenderness: There is abdominal tenderness. There is guarding.      Comments: Diffuse tenderness with guarding, well-healed midline abdominal scar   Musculoskeletal:         General: No swelling, tenderness, deformity or signs of injury. Normal range of motion.      Cervical back: Normal range of motion and neck supple.      Right lower leg: No edema.      Left lower leg: No edema.   Skin:     General: Skin is warm and dry.      Capillary Refill: Capillary refill takes less than 2 seconds.   Neurological:      General: No focal deficit present.      Mental Status: She is alert and oriented to person, place, and time. Mental status is at baseline.   Psychiatric:         Mood and Affect: Mood normal.       Results Reviewed       Procedure Component Value Units Date/Time    Ammonia [[id number]]  (Normal) Collected: 12/02/24 2009    Lab Status: Final result Specimen: Blood from Arm, Right Updated: 12/02/24 2035     Ammonia 66 umol/L     Comprehensive metabolic panel [[id number]]  (Abnormal) Collected: 12/02/24 1747    Lab Status: Final result Specimen: Blood from Arm, Right Updated: 12/02/24 1808     Sodium 139 mmol/L      Potassium 3.7 mmol/L      Chloride 103 mmol/L      CO2 30 mmol/L      ANION GAP 6 mmol/L      BUN 6 mg/dL      Creatinine 0.65 mg/dL      Glucose 101 mg/dL      Calcium 8.4 mg/dL      Corrected Calcium 9.1 mg/dL      AST 68 U/L      ALT 34 U/L      Alkaline Phosphatase 237 U/L      Total Protein 7.2 g/dL      Albumin 3.1 g/dL      Total Bilirubin 1.66 mg/dL      eGFR 100 ml/min/1.73sq m     Narrative:      National Kidney Disease Foundation guidelines for Chronic Kidney Disease (CKD):     Stage 1 with normal or high GFR (GFR > 90 mL/min/1.73 square meters)    Stage 2 Mild CKD (GFR = 60-89 mL/min/1.73 square meters)    Stage 3A Moderate CKD (GFR = 45-59 mL/min/1.73 square meters)    Stage 3B Moderate CKD (GFR = 30-44 mL/min/1.73 square meters)    Stage 4 Severe CKD (GFR = 15-29 mL/min/1.73 square  meters)    Stage 5 End Stage CKD (GFR <15 mL/min/1.73 square meters)  Note: GFR calculation is accurate only with a steady state creatinine    Lipase [[id number]]  (Normal) Collected: 12/02/24 1747    Lab Status: Final result Specimen: Blood from Arm, Right Updated: 12/02/24 1808     Lipase 20 u/L     CBC and differential [[id number]]  (Abnormal) Collected: 12/02/24 1747    Lab Status: Final result Specimen: Blood from Arm, Right Updated: 12/02/24 1757     WBC 3.57 Thousand/uL      RBC 3.58 Million/uL      Hemoglobin 11.1 g/dL      Hematocrit 34.7 %      MCV 97 fL      MCH 31.0 pg      MCHC 32.0 g/dL      RDW 15.9 %      MPV 9.6 fL      Platelets 72 Thousands/uL      nRBC 0 /100 WBCs      Segmented % 68 %      Immature Grans % 0 %      Lymphocytes % 22 %      Monocytes % 7 %      Eosinophils Relative 2 %      Basophils Relative 1 %      Absolute Neutrophils 2.45 Thousands/µL      Absolute Immature Grans 0.00 Thousand/uL      Absolute Lymphocytes 0.78 Thousands/µL      Absolute Monocytes 0.24 Thousand/µL      Eosinophils Absolute 0.06 Thousand/µL      Basophils Absolute 0.04 Thousands/µL     UA w Reflex to Microscopic w Reflex to Culture [[id number]]     Lab Status: No result Specimen: Urine             CT abdomen pelvis with contrast   Final Interpretation by Lester Galan MD (12/02 1934)      Cirrhosis with stigmata of portal hypertension. Trace perihepatic ascites.      No acute findings in the abdomen and pelvis.         Workstation performed: [id number]             Procedures    ED Medication and Procedure Management   Prior to Admission Medications   Prescriptions Last Dose Informant Patient Reported? Taking?   folic acid (FOLVITE) 1 mg tablet   Yes No   Sig: Take 1 mg by mouth daily   gabapentin (NEURONTIN) 100 mg capsule  Self No No   Sig: Take 2 capsules (200 mg total) by mouth 3 (three) times a day   hydroxychloroquine (PLAQUENIL) 200 mg tablet   Yes No   Sig: Take 200 mg by mouth 2 (two) times a day with  meals   lactulose (CHRONULAC) 10 g/15 mL solution   No No   Sig: Take 30 mL (20 g total) by mouth 4 (four) times a day Goal bowel movements is 4 a day   lidocaine (LIDODERM) 5 %   No No   Sig: Apply 1 patch topically over 12 hours daily Remove & Discard patch within 12 hours or as directed by MD   nystatin (MYCOSTATIN) powder   No No   Sig: Apply topically 2 (two) times a day   oxyCODONE-acetaminophen (PERCOCET)  mg per tablet   Yes No   Sig: Take 1 tablet by mouth every 6 (six) hours as needed for moderate pain   pantoprazole (PROTONIX) 40 mg tablet   No No   Sig: Take 1 tablet (40 mg total) by mouth daily in the early morning   sertraline (ZOLOFT) 100 mg tablet   No No   Sig: Take 1 tablet (100 mg total) by mouth daily   spironolactone (ALDACTONE) 50 mg tablet   No No   Sig: Take 1 tablet (50 mg total) by mouth daily   torsemide (DEMADEX) 20 mg tablet   No No   Sig: Take 1 tablet (20 mg total) by mouth daily      Facility-Administered Medications: None     Discharge Medication List as of 12/2/2024  7:47 PM        START taking these medications    Details   ondansetron (ZOFRAN) 4 mg tablet Take 1 tablet (4 mg total) by mouth every 6 (six) hours, Starting Mon 12/2/2024, Normal           CONTINUE these medications which have NOT CHANGED    Details   folic acid (FOLVITE) 1 mg tablet Take 1 mg by mouth daily, Starting Thu 8/4/2016, Until Fri 2/28/2025, Historical Med      gabapentin (NEURONTIN) 100 mg capsule Take 2 capsules (200 mg total) by mouth 3 (three) times a day, Starting Sat 8/3/2024, Until Fri 11/15/2024, No Print      hydroxychloroquine (PLAQUENIL) 200 mg tablet Take 200 mg by mouth 2 (two) times a day with meals, Historical Med      lactulose (CHRONULAC) 10 g/15 mL solution Take 30 mL (20 g total) by mouth 4 (four) times a day Goal bowel movements is 4 a day, Starting Tue 11/26/2024, Until Thu 12/26/2024, Normal      lidocaine (LIDODERM) 5 % Apply 1 patch topically over 12 hours daily Remove & Discard  patch within 12 hours or as directed by MD, Starting Sun 8/4/2024, No Print      nystatin (MYCOSTATIN) powder Apply topically 2 (two) times a day, Starting Sun 10/6/2024, Until Fri 11/15/2024, Normal      oxyCODONE-acetaminophen (PERCOCET)  mg per tablet Take 1 tablet by mouth every 6 (six) hours as needed for moderate pain, Historical Med      pantoprazole (PROTONIX) 40 mg tablet Take 1 tablet (40 mg total) by mouth daily in the early morning, Starting Wed 11/27/2024, Normal      sertraline (ZOLOFT) 100 mg tablet Take 1 tablet (100 mg total) by mouth daily, Starting Mon 7/8/2024, Normal      spironolactone (ALDACTONE) 50 mg tablet Take 1 tablet (50 mg total) by mouth daily, Starting Tue 11/26/2024, Until Thu 12/26/2024, Normal      torsemide (DEMADEX) 20 mg tablet Take 1 tablet (20 mg total) by mouth daily, Starting Tue 11/26/2024, Until Thu 12/26/2024, Normal           No discharge procedures on file.  ED SEPSIS DOCUMENTATION   Time reflects when diagnosis was documented in both MDM as applicable and the Disposition within this note       Time User Action Codes Description Comment    12/2/2024  7:46 PM Arabella Mei [R11.0] Nausea     12/2/2024  7:46 PM Arabella Mei [R10.9] Abdominal pain                  Arabella Mei,   12/02/24 5335

## 2024-12-03 NOTE — ED NOTES
Patient requesting pain medication for pain in her neck abdomen and lower back being 7/10 pain. Provider notified      Joselyn Weinberg RN  12/02/24 1935

## 2024-12-26 ENCOUNTER — APPOINTMENT (EMERGENCY)
Dept: CT IMAGING | Facility: HOSPITAL | Age: 54
DRG: 442 | End: 2024-12-26
Payer: COMMERCIAL

## 2024-12-26 ENCOUNTER — APPOINTMENT (EMERGENCY)
Dept: RADIOLOGY | Facility: HOSPITAL | Age: 54
DRG: 442 | End: 2024-12-26
Payer: COMMERCIAL

## 2024-12-26 ENCOUNTER — HOSPITAL ENCOUNTER (INPATIENT)
Facility: HOSPITAL | Age: 54
LOS: 2 days | Discharge: HOME | DRG: 442 | End: 2024-12-28
Attending: EMERGENCY MEDICINE | Admitting: FAMILY MEDICINE
Payer: COMMERCIAL

## 2024-12-26 DIAGNOSIS — E87.6 HYPOKALEMIA: ICD-10-CM

## 2024-12-26 DIAGNOSIS — J11.1 INFLUENZA: ICD-10-CM

## 2024-12-26 DIAGNOSIS — M54.9 INTRACTABLE BACK PAIN: ICD-10-CM

## 2024-12-26 DIAGNOSIS — E72.20 HYPERAMMONEMIA (HCC): Primary | ICD-10-CM

## 2024-12-26 PROBLEM — J10.1 INFLUENZA B: Status: ACTIVE | Noted: 2024-12-26

## 2024-12-26 LAB
ALBUMIN SERPL BCG-MCNC: 3 G/DL (ref 3.5–5)
ALP SERPL-CCNC: 200 U/L (ref 34–104)
ALT SERPL W P-5'-P-CCNC: 16 U/L (ref 7–52)
AMMONIA PLAS-SCNC: 214 UMOL/L (ref 18–72)
ANION GAP SERPL CALCULATED.3IONS-SCNC: 6 MMOL/L (ref 4–13)
APTT PPP: 34 SECONDS (ref 23–34)
AST SERPL W P-5'-P-CCNC: 36 U/L (ref 13–39)
ATRIAL RATE: 101 BPM
BASOPHILS # BLD AUTO: 0.03 THOUSANDS/ÂΜL (ref 0–0.1)
BASOPHILS NFR BLD AUTO: 1 % (ref 0–1)
BILIRUB SERPL-MCNC: 1.24 MG/DL (ref 0.2–1)
BNP SERPL-MCNC: 15 PG/ML (ref 0–100)
BUN SERPL-MCNC: 7 MG/DL (ref 5–25)
CALCIUM ALBUM COR SERPL-MCNC: 8.9 MG/DL (ref 8.3–10.1)
CALCIUM SERPL-MCNC: 8.1 MG/DL (ref 8.4–10.2)
CHLORIDE SERPL-SCNC: 97 MMOL/L (ref 96–108)
CO2 SERPL-SCNC: 35 MMOL/L (ref 21–32)
CREAT SERPL-MCNC: 0.86 MG/DL (ref 0.6–1.3)
EOSINOPHIL # BLD AUTO: 0.06 THOUSAND/ÂΜL (ref 0–0.61)
EOSINOPHIL NFR BLD AUTO: 2 % (ref 0–6)
ERYTHROCYTE [DISTWIDTH] IN BLOOD BY AUTOMATED COUNT: 15.9 % (ref 11.6–15.1)
FLUAV AG UPPER RESP QL IA.RAPID: NEGATIVE
FLUBV AG UPPER RESP QL IA.RAPID: POSITIVE
GFR SERPL CREATININE-BSD FRML MDRD: 76 ML/MIN/1.73SQ M
GLUCOSE SERPL-MCNC: 134 MG/DL (ref 65–140)
GLUCOSE SERPL-MCNC: 136 MG/DL (ref 65–140)
HCT VFR BLD AUTO: 32.4 % (ref 34.8–46.1)
HGB BLD-MCNC: 10.5 G/DL (ref 11.5–15.4)
IMM GRANULOCYTES # BLD AUTO: 0.01 THOUSAND/UL (ref 0–0.2)
IMM GRANULOCYTES NFR BLD AUTO: 0 % (ref 0–2)
INR PPP: 1.31 (ref 0.85–1.19)
LIPASE SERPL-CCNC: 9 U/L (ref 11–82)
LYMPHOCYTES # BLD AUTO: 0.56 THOUSANDS/ÂΜL (ref 0.6–4.47)
LYMPHOCYTES NFR BLD AUTO: 23 % (ref 14–44)
MCH RBC QN AUTO: 31 PG (ref 26.8–34.3)
MCHC RBC AUTO-ENTMCNC: 32.4 G/DL (ref 31.4–37.4)
MCV RBC AUTO: 96 FL (ref 82–98)
MONOCYTES # BLD AUTO: 0.18 THOUSAND/ÂΜL (ref 0.17–1.22)
MONOCYTES NFR BLD AUTO: 7 % (ref 4–12)
NEUTROPHILS # BLD AUTO: 1.62 THOUSANDS/ÂΜL (ref 1.85–7.62)
NEUTS SEG NFR BLD AUTO: 67 % (ref 43–75)
NRBC BLD AUTO-RTO: 0 /100 WBCS
P AXIS: 61 DEGREES
PLATELET # BLD AUTO: 67 THOUSANDS/UL (ref 149–390)
PMV BLD AUTO: 9.9 FL (ref 8.9–12.7)
POTASSIUM SERPL-SCNC: 3.2 MMOL/L (ref 3.5–5.3)
PR INTERVAL: 178 MS
PROT SERPL-MCNC: 7 G/DL (ref 6.4–8.4)
PROTHROMBIN TIME: 16.7 SECONDS (ref 12.3–15)
QRS AXIS: 5 DEGREES
QRSD INTERVAL: 98 MS
QT INTERVAL: 388 MS
QTC INTERVAL: 503 MS
RBC # BLD AUTO: 3.39 MILLION/UL (ref 3.81–5.12)
SARS-COV+SARS-COV-2 AG RESP QL IA.RAPID: NEGATIVE
SODIUM SERPL-SCNC: 138 MMOL/L (ref 135–147)
T WAVE AXIS: 35 DEGREES
VENTRICULAR RATE: 101 BPM
WBC # BLD AUTO: 2.46 THOUSAND/UL (ref 4.31–10.16)

## 2024-12-26 PROCEDURE — 70450 CT HEAD/BRAIN W/O DYE: CPT

## 2024-12-26 PROCEDURE — 96375 TX/PRO/DX INJ NEW DRUG ADDON: CPT

## 2024-12-26 PROCEDURE — 96361 HYDRATE IV INFUSION ADD-ON: CPT

## 2024-12-26 PROCEDURE — 99285 EMERGENCY DEPT VISIT HI MDM: CPT

## 2024-12-26 PROCEDURE — 74177 CT ABD & PELVIS W/CONTRAST: CPT

## 2024-12-26 PROCEDURE — 82140 ASSAY OF AMMONIA: CPT | Performed by: EMERGENCY MEDICINE

## 2024-12-26 PROCEDURE — 71260 CT THORAX DX C+: CPT

## 2024-12-26 PROCEDURE — 36415 COLL VENOUS BLD VENIPUNCTURE: CPT | Performed by: EMERGENCY MEDICINE

## 2024-12-26 PROCEDURE — 83880 ASSAY OF NATRIURETIC PEPTIDE: CPT | Performed by: EMERGENCY MEDICINE

## 2024-12-26 PROCEDURE — 80053 COMPREHEN METABOLIC PANEL: CPT | Performed by: EMERGENCY MEDICINE

## 2024-12-26 PROCEDURE — 99223 1ST HOSP IP/OBS HIGH 75: CPT | Performed by: FAMILY MEDICINE

## 2024-12-26 PROCEDURE — 71045 X-RAY EXAM CHEST 1 VIEW: CPT

## 2024-12-26 PROCEDURE — 99285 EMERGENCY DEPT VISIT HI MDM: CPT | Performed by: EMERGENCY MEDICINE

## 2024-12-26 PROCEDURE — 85610 PROTHROMBIN TIME: CPT | Performed by: EMERGENCY MEDICINE

## 2024-12-26 PROCEDURE — 82948 REAGENT STRIP/BLOOD GLUCOSE: CPT

## 2024-12-26 PROCEDURE — 83690 ASSAY OF LIPASE: CPT | Performed by: EMERGENCY MEDICINE

## 2024-12-26 PROCEDURE — 85025 COMPLETE CBC W/AUTO DIFF WBC: CPT | Performed by: EMERGENCY MEDICINE

## 2024-12-26 PROCEDURE — 87811 SARS-COV-2 COVID19 W/OPTIC: CPT | Performed by: EMERGENCY MEDICINE

## 2024-12-26 PROCEDURE — 96374 THER/PROPH/DIAG INJ IV PUSH: CPT

## 2024-12-26 PROCEDURE — 87804 INFLUENZA ASSAY W/OPTIC: CPT | Performed by: EMERGENCY MEDICINE

## 2024-12-26 PROCEDURE — 93005 ELECTROCARDIOGRAM TRACING: CPT

## 2024-12-26 PROCEDURE — 85730 THROMBOPLASTIN TIME PARTIAL: CPT | Performed by: EMERGENCY MEDICINE

## 2024-12-26 RX ORDER — LACTULOSE 10 G/15ML
30 SOLUTION ORAL
Status: DISCONTINUED | OUTPATIENT
Start: 2024-12-26 | End: 2024-12-26

## 2024-12-26 RX ORDER — FOLIC ACID 1 MG/1
1 TABLET ORAL DAILY
Status: DISCONTINUED | OUTPATIENT
Start: 2024-12-26 | End: 2024-12-28 | Stop reason: HOSPADM

## 2024-12-26 RX ORDER — GABAPENTIN 100 MG/1
200 CAPSULE ORAL 3 TIMES DAILY
Status: DISCONTINUED | OUTPATIENT
Start: 2024-12-26 | End: 2024-12-28 | Stop reason: HOSPADM

## 2024-12-26 RX ORDER — SPIRONOLACTONE 25 MG/1
50 TABLET ORAL DAILY
Status: DISCONTINUED | OUTPATIENT
Start: 2024-12-27 | End: 2024-12-28 | Stop reason: HOSPADM

## 2024-12-26 RX ORDER — SERTRALINE HYDROCHLORIDE 100 MG/1
100 TABLET, FILM COATED ORAL DAILY
Status: DISCONTINUED | OUTPATIENT
Start: 2024-12-26 | End: 2024-12-28 | Stop reason: HOSPADM

## 2024-12-26 RX ORDER — LACTULOSE 10 G/15ML
20 SOLUTION ORAL ONCE
Status: COMPLETED | OUTPATIENT
Start: 2024-12-26 | End: 2024-12-26

## 2024-12-26 RX ORDER — HEPARIN SODIUM 5000 [USP'U]/ML
5000 INJECTION, SOLUTION INTRAVENOUS; SUBCUTANEOUS EVERY 8 HOURS SCHEDULED
Status: DISCONTINUED | OUTPATIENT
Start: 2024-12-26 | End: 2024-12-28 | Stop reason: HOSPADM

## 2024-12-26 RX ORDER — ONDANSETRON 2 MG/ML
4 INJECTION INTRAMUSCULAR; INTRAVENOUS ONCE
Status: COMPLETED | OUTPATIENT
Start: 2024-12-26 | End: 2024-12-26

## 2024-12-26 RX ORDER — TORSEMIDE 20 MG/1
20 TABLET ORAL DAILY
Status: DISCONTINUED | OUTPATIENT
Start: 2024-12-27 | End: 2024-12-28 | Stop reason: HOSPADM

## 2024-12-26 RX ORDER — HYDROXYCHLOROQUINE SULFATE 200 MG/1
200 TABLET, FILM COATED ORAL 2 TIMES DAILY WITH MEALS
Status: DISCONTINUED | OUTPATIENT
Start: 2024-12-26 | End: 2024-12-28 | Stop reason: HOSPADM

## 2024-12-26 RX ORDER — PANTOPRAZOLE SODIUM 40 MG/1
40 TABLET, DELAYED RELEASE ORAL
Status: DISCONTINUED | OUTPATIENT
Start: 2024-12-27 | End: 2024-12-28 | Stop reason: HOSPADM

## 2024-12-26 RX ORDER — LACTULOSE 10 G/15ML
20 SOLUTION ORAL 3 TIMES DAILY
Status: DISCONTINUED | OUTPATIENT
Start: 2024-12-27 | End: 2024-12-27

## 2024-12-26 RX ORDER — ONDANSETRON 2 MG/ML
4 INJECTION INTRAMUSCULAR; INTRAVENOUS EVERY 6 HOURS PRN
Status: DISCONTINUED | OUTPATIENT
Start: 2024-12-26 | End: 2024-12-26

## 2024-12-26 RX ORDER — POTASSIUM CHLORIDE 14.9 MG/ML
20 INJECTION INTRAVENOUS ONCE
Status: COMPLETED | OUTPATIENT
Start: 2024-12-26 | End: 2024-12-26

## 2024-12-26 RX ORDER — OSELTAMIVIR PHOSPHATE 75 MG/1
75 CAPSULE ORAL EVERY 12 HOURS SCHEDULED
Status: DISCONTINUED | OUTPATIENT
Start: 2024-12-26 | End: 2024-12-28 | Stop reason: HOSPADM

## 2024-12-26 RX ADMIN — LACTULOSE 30 G: 20 SOLUTION ORAL at 19:53

## 2024-12-26 RX ADMIN — POTASSIUM CHLORIDE 20 MEQ: 14.9 INJECTION, SOLUTION INTRAVENOUS at 12:08

## 2024-12-26 RX ADMIN — LACTULOSE 30 G: 20 SOLUTION ORAL at 17:58

## 2024-12-26 RX ADMIN — IOHEXOL 100 ML: 350 INJECTION, SOLUTION INTRAVENOUS at 10:01

## 2024-12-26 RX ADMIN — OSELTAMAVIR PHOSPHATE 75 MG: 75 CAPSULE ORAL at 13:56

## 2024-12-26 RX ADMIN — HYDROXYCHLOROQUINE SULFATE 200 MG: 200 TABLET, FILM COATED ORAL at 16:04

## 2024-12-26 RX ADMIN — ONDANSETRON 4 MG: 2 INJECTION INTRAMUSCULAR; INTRAVENOUS at 11:48

## 2024-12-26 RX ADMIN — HEPARIN SODIUM 5000 UNITS: 5000 INJECTION, SOLUTION INTRAVENOUS; SUBCUTANEOUS at 13:56

## 2024-12-26 RX ADMIN — LACTULOSE 30 G: 20 SOLUTION ORAL at 16:05

## 2024-12-26 RX ADMIN — LACTULOSE 20 G: 20 SOLUTION ORAL at 08:55

## 2024-12-26 RX ADMIN — FOLIC ACID 1 MG: 1 TABLET ORAL at 16:05

## 2024-12-26 RX ADMIN — GABAPENTIN 200 MG: 100 CAPSULE ORAL at 21:48

## 2024-12-26 RX ADMIN — SERTRALINE 100 MG: 100 TABLET, FILM COATED ORAL at 16:05

## 2024-12-26 RX ADMIN — HEPARIN SODIUM 5000 UNITS: 5000 INJECTION, SOLUTION INTRAVENOUS; SUBCUTANEOUS at 21:48

## 2024-12-26 RX ADMIN — GABAPENTIN 200 MG: 100 CAPSULE ORAL at 16:04

## 2024-12-26 RX ADMIN — OSELTAMAVIR PHOSPHATE 75 MG: 75 CAPSULE ORAL at 21:48

## 2024-12-26 RX ADMIN — LACTULOSE 30 G: 20 SOLUTION ORAL at 21:48

## 2024-12-26 RX ADMIN — LACTULOSE SOLUTION USP, 10 G/15 ML 200 G: 10 SOLUTION ORAL; RECTAL at 13:26

## 2024-12-26 RX ADMIN — ONDANSETRON 4 MG: 2 INJECTION INTRAMUSCULAR; INTRAVENOUS at 08:42

## 2024-12-26 RX ADMIN — SODIUM CHLORIDE 1000 ML: 0.9 INJECTION, SOLUTION INTRAVENOUS at 08:41

## 2024-12-26 NOTE — H&P
H&P - Hospitalist   Name: Bailey Olsen 54 y.o. female I MRN: [id number]  Unit/Bed#: -01 I Date of Admission: 12/26/2024   Date of Service: 12/26/2024 I Hospital Day: 0     Assessment & Plan  Hepatic encephalopathy (HCC)  Poa with dry heaving at home and overall not feeling well  Reports compliance with lactulose TID and having around 2 BM daily with most recent being morning of admission  On admission, oriented to self, place, time, and situation. A little slow to respond   CT head normal   CT CAP with cirrhosis, portal HTN and small amount of ascites.  Ammonia 214 on admission   Denies alcohol use   Did not respond to po lactulose in ED   Will give lactulose retention enema x1 followed by po lactulose 30mg every 2 hours until having 3-4 BM, then can decrease timing   Will consult GI since patient still with elevated ammonia despite following outpatient regimen  Continue to trend daily ammonia levels   Monitor mental status  Hyperammonemia (HCC)  Ammonia 214 on admission   See plan under hepatic encephalopathy   Other cirrhosis of liver (HCC)  Followed by GI  Continue Aldactone and torsemide  Continue rifaximin and lactulose  LFT and T bili at baseline  Class 3 severe obesity due to excess calories in adult (HCC)  Educate on lifestyle changes   Influenza B  Tested positive on admission  Supportive care   Start tamiflu  Pancytopenia (HCC)  Chronically anemic, baseline Hgb 9-10  Chronic thrombocytopenia, baseline around 70  With DVT prophylaxis, hold if platelet below 50      VTE Pharmacologic Prophylaxis:   Moderate Risk (Score 3-4) - Pharmacological DVT Prophylaxis Ordered: heparin.  Code Status: Level 1 - Full Code   Discussion with family: Updated  () via phone.    Anticipated Length of Stay: Patient will be admitted on an inpatient basis with an anticipated length of stay of greater than 2 midnights secondary to acute hepatic encephalopathy requiring lactulose .    History of Present  "Illness   Chief Complaint: \"I was feeling off today\"    Bailey Olsen is a 54 y.o. female with a PMH of cirrhosis, obesity, hepatic encephalopathy who presents with altered mental status. Patient states this morning she was feeling off and not herself. States that she has been dry heaving, and this is how she gets when her ammonia level is high. States her last BM was this morning. Denies any recent sick contacts that she is aware of. Also admits to compliance of lactulose three times daily and has been having around 2 BM daily.     Review of Systems   Constitutional:  Negative for chills, fatigue and fever.   HENT:  Negative for congestion.    Respiratory:  Negative for cough, chest tightness, shortness of breath and wheezing.    Cardiovascular:  Negative for chest pain, palpitations and leg swelling.   Gastrointestinal:  Positive for nausea. Negative for abdominal distention, abdominal pain, constipation, diarrhea and vomiting.   Genitourinary:  Negative for difficulty urinating and dysuria.   Musculoskeletal:  Negative for arthralgias and back pain.   Skin:  Negative for wound.   Neurological:  Negative for dizziness, weakness, light-headedness, numbness and headaches.   Psychiatric/Behavioral:  Positive for confusion. Negative for agitation and behavioral problems.        Historical Information   Past Medical History:   Diagnosis Date    Back pain     Carcinoid tumor     neuroendocrine    CHF (congestive heart failure) (HCC)     Cirrhosis of liver (HCC)     Sarcoidosis      Past Surgical History:   Procedure Laterality Date    CHOLECYSTECTOMY      GASTRIC BYPASS      HERNIA REPAIR      IR KYPHOPLASTY/VERTEBROPLASTY  10/15/2024     Social History     Tobacco Use    Smoking status: Never    Smokeless tobacco: Never   Vaping Use    Vaping status: Never Used   Substance and Sexual Activity    Alcohol use: Never    Drug use: Never    Sexual activity: Not Currently     E-Cigarette/Vaping    E-Cigarette Use Never User "      E-Cigarette/Vaping Substances     Family History   Problem Relation Age of Onset    Hypertension Father      Social History:  Marital Status: /Civil Union   Patient Pre-hospital Living Situation: Home  Patient Pre-hospital Level of Mobility: walks  Patient Pre-hospital Diet Restrictions: none    Meds/Allergies   I have reviewed home medications with patient personally.  Prior to Admission medications    Medication Sig Start Date End Date Taking? Authorizing Provider   folic acid (FOLVITE) 1 mg tablet Take 1 mg by mouth daily 8/4/16 2/28/25  Historical Provider, MD   gabapentin (NEURONTIN) 100 mg capsule Take 2 capsules (200 mg total) by mouth 3 (three) times a day 8/3/24 11/15/24  Ruslan Keyes MD   hydroxychloroquine (PLAQUENIL) 200 mg tablet Take 200 mg by mouth 2 (two) times a day with meals    Historical Provider, MD   lactulose (CHRONULAC) 10 g/15 mL solution Take 30 mL (20 g total) by mouth 4 (four) times a day Goal bowel movements is 4 a day 11/26/24 12/26/24  Antonio Anaya,    lidocaine (LIDODERM) 5 % Apply 1 patch topically over 12 hours daily Remove & Discard patch within 12 hours or as directed by MD 8/4/24   Ruslan Keyes MD   nystatin (MYCOSTATIN) powder Apply topically 2 (two) times a day 10/6/24 11/15/24  Idalia Stuart MD   ondansetron (ZOFRAN) 4 mg tablet Take 1 tablet (4 mg total) by mouth every 6 (six) hours 12/2/24   Arabella Mei DO   oxyCODONE-acetaminophen (PERCOCET)  mg per tablet Take 1 tablet by mouth every 6 (six) hours as needed for moderate pain    Historical Provider, MD   pantoprazole (PROTONIX) 40 mg tablet Take 1 tablet (40 mg total) by mouth daily in the early morning 11/27/24   Antonio Anaya DO   sertraline (ZOLOFT) 100 mg tablet Take 1 tablet (100 mg total) by mouth daily 7/8/24   Mar Oliva MD   spironolactone (ALDACTONE) 50 mg tablet Take 1 tablet (50 mg total) by mouth daily 11/26/24 12/26/24  Antonio Anaya DO    torsemide (DEMADEX) 20 mg tablet Take 1 tablet (20 mg total) by mouth daily 11/26/24 12/26/24  Antonio Anaya, DO     Allergies   Allergen Reactions    Zolpidem GI Intolerance, Other (See Comments) and Hives     Other Reaction(s): Other      Blacks out    Blacks out    Tapentadol Sneezing and Other (See Comments)     Zoned out    Alprazolam Other (See Comments)     Other Reaction(s): Other      Feels zoned out    Feels zoned out    Amoxicillin-Pot Clavulanate Hives and Nausea Only     Amoxil fine    Dust Mite Extract Sneezing    Morphine Headache, Nausea Only and Vomiting    Tramadol GI Intolerance    Azelastine Rash    Azelastine Hcl Rash    Molds & Smuts Rash     Other Reaction(s): Unknown       Objective :  Temp:  [97.2 °F (36.2 °C)-99.3 °F (37.4 °C)] 97.2 °F (36.2 °C)  HR:  [] 92  BP: (135-152)/(67-81) 144/78  Resp:  [13-19] 17  SpO2:  [94 %-99 %] 97 %  O2 Device: None (Room air)    Physical Exam  Vitals reviewed.   Constitutional:       General: She is not in acute distress.     Appearance: Normal appearance. She is not ill-appearing.   HENT:      Head: Normocephalic and atraumatic.      Nose: Nose normal.      Mouth/Throat:      Mouth: Mucous membranes are moist.      Pharynx: Oropharynx is clear.   Eyes:      Extraocular Movements: Extraocular movements intact.      Conjunctiva/sclera: Conjunctivae normal.   Cardiovascular:      Rate and Rhythm: Normal rate and regular rhythm.      Pulses: Normal pulses.      Heart sounds: Normal heart sounds. No murmur heard.  Pulmonary:      Effort: Pulmonary effort is normal. No respiratory distress.      Breath sounds: Normal breath sounds. No wheezing or rhonchi.   Abdominal:      General: Abdomen is flat. Bowel sounds are normal. There is no distension.      Palpations: Abdomen is soft.      Tenderness: There is no abdominal tenderness. There is no guarding.   Musculoskeletal:         General: Normal range of motion.      Cervical back: Normal range of  motion.      Right lower leg: No edema.      Left lower leg: No edema.   Skin:     General: Skin is warm.      Comments: Chronic venous stasis of lower extremities    Neurological:      General: No focal deficit present.      Mental Status: She is alert and oriented to person, place, and time. Mental status is at baseline.      Motor: No weakness.   Psychiatric:         Mood and Affect: Mood normal.         Behavior: Behavior normal.         Thought Content: Thought content normal.         Judgment: Judgment normal.         Lines/Drains:          Lab Results: I have reviewed the following results:  Results from last 7 days   Lab Units 12/26/24  0854   WBC Thousand/uL 2.46*   HEMOGLOBIN g/dL 10.5*   HEMATOCRIT % 32.4*   PLATELETS Thousands/uL 67*   SEGS PCT % 67   LYMPHO PCT % 23   MONO PCT % 7   EOS PCT % 2     Results from last 7 days   Lab Units 12/26/24  0854   SODIUM mmol/L 138   POTASSIUM mmol/L 3.2*   CHLORIDE mmol/L 97   CO2 mmol/L 35*   BUN mg/dL 7   CREATININE mg/dL 0.86   ANION GAP mmol/L 6   CALCIUM mg/dL 8.1*   ALBUMIN g/dL 3.0*   TOTAL BILIRUBIN mg/dL 1.24*   ALK PHOS U/L 200*   ALT U/L 16   AST U/L 36   GLUCOSE RANDOM mg/dL 134     Results from last 7 days   Lab Units 12/26/24  0854   INR  1.31*     Results from last 7 days   Lab Units 12/26/24  0817   POC GLUCOSE mg/dl 136     Lab Results   Component Value Date    HGBA1C 5.9 (H) 02/27/2024    HGBA1C 5.9 08/03/2023           Imaging Results Review: I reviewed radiology reports from this admission including: CT chest, CT abdomen/pelvis, and CT head.    Administrative Statements     ** Please Note: This note has been constructed using a voice recognition system. **

## 2024-12-26 NOTE — PLAN OF CARE
Problem: PAIN - ADULT  Goal: Verbalizes/displays adequate comfort level or baseline comfort level  Description: Interventions:  - Encourage patient to monitor pain and request assistance  - Assess pain using appropriate pain scale  - Administer analgesics based on type and severity of pain and evaluate response  - Implement non-pharmacological measures as appropriate and evaluate response  - Consider cultural and social influences on pain and pain management  - Notify physician/advanced practitioner if interventions unsuccessful or patient reports new pain  Outcome: Progressing     Problem: INFECTION - ADULT  Goal: Absence or prevention of progression during hospitalization  Description: INTERVENTIONS:  - Assess and monitor for signs and symptoms of infection  - Monitor lab/diagnostic results  - Monitor all insertion sites, i.e. indwelling lines, tubes, and drains  - Monitor endotracheal if appropriate and nasal secretions for changes in amount and color  - Paguate appropriate cooling/warming therapies per order  - Administer medications as ordered  - Instruct and encourage patient and family to use good hand hygiene technique  - Identify and instruct in appropriate isolation precautions for identified infection/condition  Outcome: Progressing  Goal: Absence of fever/infection during neutropenic period  Description: INTERVENTIONS:  - Monitor WBC    Outcome: Progressing     Problem: SAFETY ADULT  Goal: Patient will remain free of falls  Description: INTERVENTIONS:  - Educate patient/family on patient safety including physical limitations  - Instruct patient to call for assistance with activity   - Consult OT/PT to assist with strengthening/mobility   - Keep Call bell within reach  - Keep bed low and locked with side rails adjusted as appropriate  - Keep care items and personal belongings within reach  - Initiate and maintain comfort rounds  - Make Fall Risk Sign visible to staff  - Offer Toileting every 2 Hours,  in advance of need  - Initiate/Maintain   alarm  - Obtain necessary fall risk management equipment:     - Apply yellow socks and bracelet for high fall risk patients  - Consider moving patient to room near nurses station  Outcome: Progressing  Goal: Maintain or return to baseline ADL function  Description: INTERVENTIONS:  -  Assess patient's ability to carry out ADLs; assess patient's baseline for ADL function and identify physical deficits which impact ability to perform ADLs (bathing, care of mouth/teeth, toileting, grooming, dressing, etc.)  - Assess/evaluate cause of self-care deficits   - Assess range of motion  - Assess patient's mobility; develop plan if impaired  - Assess patient's need for assistive devices and provide as appropriate  - Encourage maximum independence but intervene and supervise when necessary  - Involve family in performance of ADLs  - Assess for home care needs following discharge   - Consider OT consult to assist with ADL evaluation and planning for discharge  - Provide patient education as appropriate  Outcome: Progressing  Goal: Maintains/Returns to pre admission functional level  Description: INTERVENTIONS:  - Perform AM-PAC 6 Click Basic Mobility/ Daily Activity assessment daily.  - Set and communicate daily mobility goal to care team and patient/family/caregiver.   - Collaborate with rehabilitation services on mobility goals if consulted  - Perform Range of Motion 3 times a day.  - Reposition patient every 2 hours.  - Dangle patient 3 times a day  - Stand patient 3 times a day  - Ambulate patient 3 times a day  - Out of bed to chair 3 times a day   - Out of bed for meals 3 times a day  - Out of bed for toileting  - Record patient progress and toleration of activity level   Outcome: Progressing     Problem: DISCHARGE PLANNING  Goal: Discharge to home or other facility with appropriate resources  Description: INTERVENTIONS:  - Identify barriers to discharge w/patient and caregiver  -  Arrange for needed discharge resources and transportation as appropriate  - Identify discharge learning needs (meds, wound care, etc.)  - Arrange for interpretive services to assist at discharge as needed  - Refer to Case Management Department for coordinating discharge planning if the patient needs post-hospital services based on physician/advanced practitioner order or complex needs related to functional status, cognitive ability, or social support system  Outcome: Progressing     Problem: Knowledge Deficit  Goal: Patient/family/caregiver demonstrates understanding of disease process, treatment plan, medications, and discharge instructions  Description: Complete learning assessment and assess knowledge base.  Interventions:  - Provide teaching at level of understanding  - Provide teaching via preferred learning methods  Outcome: Progressing

## 2024-12-26 NOTE — ED NOTES
Secure chat sent to [facility] charge RN. Pt to be transported to unit, no s/s of distress, VS stable, A&Ox4, ID band in place.      Valarie De Souza RN  12/26/24 [id number]

## 2024-12-26 NOTE — ASSESSMENT & PLAN NOTE
Poa with dry heaving at home and overall not feeling well  Reports compliance with lactulose TID and having around 2 BM daily with most recent being morning of admission  On admission, oriented to self, place, time, and situation. A little slow to respond   CT head normal   CT CAP with cirrhosis, portal HTN and small amount of ascites.  Ammonia 214 on admission   Denies alcohol use   Did not respond to po lactulose in ED   Will give lactulose retention enema x1 followed by po lactulose 30mg every 2 hours until having 3-4 BM, then can decrease timing   Will consult GI since patient still with elevated ammonia despite following outpatient regimen  Continue to trend daily ammonia levels   Monitor mental status

## 2024-12-26 NOTE — ED PROVIDER NOTES
Time reflects when diagnosis was documented in both MDM as applicable and the Disposition within this note       Time User Action Codes Description Comment    12/26/2024 12:19 PM Thang Chirinos [E72.20] Hyperammonemia (HCC)     12/26/2024 12:19 PM Thang Chirinos [J11.1] Influenza           ED Disposition       ED Disposition   Admit    Condition   Stable    Date/Time   Thu Dec 26, 2024 12:19 PM    Comment   Case was discussed with hospital medicine and the patient's admission status was agreed to be Admission Status: inpatient status to the service of Dr. Stuart .               Assessment & Plan       Medical Decision Making  54-year-old female presents to the emergency department with generalized fatigue, weakness, subjective confusion, and subjective disorientation, differential diagnosis include metabolic encephalopathy, elevated ammonia levels, KISHORE, ACS, CHF exacerbation, fluid overload, pneumonia, viral syndrome, will perform basic labs, imaging, ammonia level, and reassess.    Discussed case with hospital medicine, patient will be admitted for further evaluation.    Amount and/or Complexity of Data Reviewed  Labs: ordered. Decision-making details documented in ED Course.  Radiology: ordered.    Risk  Prescription drug management.  Decision regarding hospitalization.        ED Course as of 12/26/24 1830   Thu Dec 26, 2024   0943 Influenza B Rapid Antigen(!): Positive   0945 Critical result on Bailey Olsen, 1970, MRN [id number] Ammonia: 214 umol/L High Critical (Ref. Range: 18-72)      1133 Cirrhosis with portal hypertension with a small amount of ascites.  Fluid-filled small bowel loops, nonspecific     Area of groundglass density left upper lung, stable, follow-up CT chest in 6 months for stability of the left upper lobe area of groundglass density     No acute consolidation         Medications   lactulose (CHRONULAC) oral solution 20 g (20 g Oral Given 12/26/24 2465)   sodium chloride 0.9 % bolus  1,000 mL (0 mL Intravenous Stopped 12/26/24 0944)   ondansetron (ZOFRAN) injection 4 mg (4 mg Intravenous Given 12/26/24 0842)   iohexol (OMNIPAQUE) 350 MG/ML injection (MULTI-DOSE) 100 mL (100 mL Intravenous Given 12/26/24 1001)   ondansetron (ZOFRAN) injection 4 mg (4 mg Intravenous Given 12/26/24 1148)   potassium chloride 20 mEq IVPB (premix) (20 mEq Intravenous New Bag 12/26/24 1208)       ED Risk Strat Scores                          SBIRT 22yo+      Flowsheet Row Most Recent Value   Initial Alcohol Screen: US AUDIT-C     1. How often do you have a drink containing alcohol? 0 Filed at: 12/26/2024 0812   2. How many drinks containing alcohol do you have on a typical day you are drinking?  0 Filed at: 12/26/2024 0812   3b. FEMALE Any Age, or MALE 65+: How often do you have 4 or more drinks on one occassion? 0 Filed at: 12/26/2024 0812   Audit-C Score 0 Filed at: 12/26/2024 0812   BERNIE: How many times in the past year have you...    Used an illegal drug or used a prescription medication for non-medical reasons? Never Filed at: 12/26/2024 0812                            History of Present Illness       Chief Complaint   Patient presents with    Weakness - Generalized     Patient feels weak and shaky, believes her liver enzymes (ammonia) levels are elevated when they are elevated with confusion, nausea (states she has been taking them) and disoriented. She called EMS herself.        Past Medical History:   Diagnosis Date    Back pain     Carcinoid tumor     neuroendocrine    CHF (congestive heart failure) (HCC)     Cirrhosis of liver (HCC)     Sarcoidosis       Past Surgical History:   Procedure Laterality Date    CHOLECYSTECTOMY      GASTRIC BYPASS      HERNIA REPAIR      IR KYPHOPLASTY/VERTEBROPLASTY  10/15/2024      Family History   Problem Relation Age of Onset    Hypertension Father       Social History     Tobacco Use    Smoking status: Never    Smokeless tobacco: Never   Vaping Use    Vaping status: Never  Used   Substance Use Topics    Alcohol use: Never    Drug use: Never      E-Cigarette/Vaping    E-Cigarette Use Never User       E-Cigarette/Vaping Substances      I have reviewed and agree with the history as documented.     54-year-old female with past medical history of carcinoid tumor, CHF, cirrhosis of liver, metabolic encephalopathy in the past due to elevated liver enzymes/ammonia levels, presents to the emergency department with feeling confused, disoriented, generalized weakness, fatigue.  Patient states he has also been having some chills.  Patient states she feels like her ammonia levels are elevated.  She denies any chest pain, shortness of breath, GI or  complaints.  Patient has been taking her lactulose daily.  No lactulose today.  She reports that she has been having the dry heaves.              Review of Systems   Constitutional:  Positive for activity change and fatigue. Negative for chills and fever.   HENT:  Negative for ear pain and sore throat.    Eyes:  Negative for pain and visual disturbance.   Respiratory:  Negative for cough and shortness of breath.    Cardiovascular:  Negative for chest pain and palpitations.   Gastrointestinal:  Positive for nausea and vomiting. Negative for abdominal pain.   Genitourinary:  Negative for dysuria and hematuria.   Musculoskeletal:  Negative for arthralgias and back pain.   Skin:  Negative for color change and rash.   Neurological:  Positive for weakness. Negative for seizures and syncope.   All other systems reviewed and are negative.          Objective       ED Triage Vitals [12/26/24 0809]   Temperature Pulse Blood Pressure Respirations SpO2 Patient Position - Orthostatic VS   99.3 °F (37.4 °C) 105 152/78 16 98 % Lying      Temp Source Heart Rate Source BP Location FiO2 (%) Pain Score    Temporal Monitor Left arm -- No Pain      Vitals      Date and Time Temp Pulse SpO2 Resp BP Pain Score FACES Pain Rating User   12/26/24 1317 -- -- -- -- -- No Pain --  AF   12/26/24 1305 -- -- -- 17 -- -- -- KT   12/26/24 1305 97.2 °F (36.2 °C) 92 97 % -- 144/78 -- -- DII   12/26/24 1230 -- 95 95 % 13 135/72 -- -- MY   12/26/24 1215 -- 94 96 % 16 139/76 -- -- MY   12/26/24 1200 -- 94 95 % 15 138/75 -- -- MY   12/26/24 1145 -- 98 96 % 19 -- -- -- MY   12/26/24 1130 -- 96 94 % 15 -- -- --    12/26/24 1115 -- 100 95 % 16 -- -- --    12/26/24 1100 -- 101 96 % 18 136/81 -- -- MY   12/26/24 0945 -- 101 97 % -- -- -- --    12/26/24 0900 -- 101 99 % 19 145/69 -- -- MY   12/26/24 0830 -- 100 98 % 13 142/67 -- -- MY   12/26/24 0815 -- 100 98 % 15 140/71 -- -- MY   12/26/24 0809 99.3 °F (37.4 °C) 105 98 % 16 152/78 No Pain -- BF            Physical Exam  Vitals and nursing note reviewed.   Constitutional:       General: She is not in acute distress.     Appearance: She is well-developed.   HENT:      Head: Normocephalic and atraumatic.   Eyes:      Conjunctiva/sclera: Conjunctivae normal.   Cardiovascular:      Rate and Rhythm: Normal rate and regular rhythm.      Heart sounds: No murmur heard.  Pulmonary:      Effort: Pulmonary effort is normal. No respiratory distress.      Breath sounds: Normal breath sounds.   Abdominal:      Palpations: Abdomen is soft.      Tenderness: There is no abdominal tenderness.   Musculoskeletal:         General: No swelling.      Cervical back: Neck supple.   Skin:     General: Skin is warm and dry.      Capillary Refill: Capillary refill takes less than 2 seconds.   Neurological:      General: No focal deficit present.      Mental Status: She is alert. She is disoriented.      Cranial Nerves: No cranial nerve deficit.      Sensory: No sensory deficit.      Motor: No weakness.      Coordination: Coordination normal.   Psychiatric:         Mood and Affect: Mood normal.         Results Reviewed       Procedure Component Value Units Date/Time    Ammonia [[id number]]  (Abnormal) Collected: 12/26/24 0808    Lab Status: Final result Specimen: Blood from Arm,  Left Updated: 12/26/24 0949     Ammonia 214 umol/L     B-Type Natriuretic Peptide(BNP) [[id number]]  (Normal) Collected: 12/26/24 0854    Lab Status: Final result Specimen: Blood from Arm, Left Updated: 12/26/24 0937     BNP 15 pg/mL     CBC and differential [[id number]]  (Abnormal) Collected: 12/26/24 0854    Lab Status: Final result Specimen: Blood from Arm, Left Updated: 12/26/24 0933     WBC 2.46 Thousand/uL      RBC 3.39 Million/uL      Hemoglobin 10.5 g/dL      Hematocrit 32.4 %      MCV 96 fL      MCH 31.0 pg      MCHC 32.4 g/dL      RDW 15.9 %      MPV 9.9 fL      Platelets 67 Thousands/uL      nRBC 0 /100 WBCs      Segmented % 67 %      Immature Grans % 0 %      Lymphocytes % 23 %      Monocytes % 7 %      Eosinophils Relative 2 %      Basophils Relative 1 %      Absolute Neutrophils 1.62 Thousands/µL      Absolute Immature Grans 0.01 Thousand/uL      Absolute Lymphocytes 0.56 Thousands/µL      Absolute Monocytes 0.18 Thousand/µL      Eosinophils Absolute 0.06 Thousand/µL      Basophils Absolute 0.03 Thousands/µL     Comprehensive metabolic panel [[id number]]  (Abnormal) Collected: 12/26/24 0854    Lab Status: Final result Specimen: Blood from Arm, Left Updated: 12/26/24 0931     Sodium 138 mmol/L      Potassium 3.2 mmol/L      Chloride 97 mmol/L      CO2 35 mmol/L      ANION GAP 6 mmol/L      BUN 7 mg/dL      Creatinine 0.86 mg/dL      Glucose 134 mg/dL      Calcium 8.1 mg/dL      Corrected Calcium 8.9 mg/dL      AST 36 U/L      ALT 16 U/L      Alkaline Phosphatase 200 U/L      Total Protein 7.0 g/dL      Albumin 3.0 g/dL      Total Bilirubin 1.24 mg/dL      eGFR 76 ml/min/1.73sq m     Narrative:      National Kid[employer] guidelines for Chronic Kidney Disease (CKD):     Stage 1 with normal or high GFR (GFR > 90 mL/min/1.73 square meters)    Stage 2 Mild CKD (GFR = 60-89 mL/min/1.73 square meters)    Stage 3A Moderate CKD (GFR = 45-59 mL/min/1.73 square meters)    Stage 3B Moderate CKD (GFR =  30-44 mL/min/1.73 square meters)    Stage 4 Severe CKD (GFR = 15-29 mL/min/1.73 square meters)    Stage 5 End Stage CKD (GFR <15 mL/min/1.73 square meters)  Note: GFR calculation is accurate only with a steady state creatinine    Lipase [[id number]]  (Abnormal) Collected: 12/26/24 0854    Lab Status: Final result Specimen: Blood from Arm, Left Updated: 12/26/24 0931     Lipase 9 u/L     FLU/COVID Rapid Antigen (30 min. TAT) - Preferred screening test in ED [[id number]]  (Abnormal) Collected: 12/26/24 0854    Lab Status: Final result Specimen: Nares from Nose Updated: 12/26/24 0931     SARS COV Rapid Antigen Negative     Influenza A Rapid Antigen Negative     Influenza B Rapid Antigen Positive    Narrative:      This test has been performed using the Quidel [facility] 2 FLU+SARS Antigen test under the Emergency Use Authorization (EUA). This test has been validated by the  and verified by the performing laboratory. The Mag[facility]ena uses lateral flow immunofluorescent sandwich assay to detect SARS-COV, Influenza A and Influenza B Antigen.     The Quidel Magdalena 2 SARS Antigen test does not differentiate between SARS-CoV and SARS-CoV-2.     Negative results are presumptive and may be confirmed with a molecular assay, if necessary, for patient management. Negative results do not rule out SARS-CoV-2 or influenza infection and should not be used as the sole basis for treatment or patient management decisions. A negative test result may occur if the level of antigen in a sample is below the limit of detection of this test.     Positive results are indicative of the presence of viral antigens, but do not rule out bacterial infection or co-infection with other viruses.     All test results should be used as an adjunct to clinical observations and other information available to the provider.    FOR PEDIATRIC PATIENTS - copy/paste COVID Guidelines URL to browser:  [email or web address]    APTT [[id number]]  (Normal) Collected: 12/26/24 0854    Lab Status: Final result Specimen: Blood from Arm, Left Updated: 12/26/24 0930     PTT 34 seconds     Protime-INR [[id number]]  (Abnormal) Collected: 12/26/24 0854    Lab Status: Final result Specimen: Blood from Arm, Left Updated: 12/26/24 0930     Protime 16.7 seconds      INR 1.31    Narrative:      INR Therapeutic Range    Indication                                             INR Range      Atrial Fibrillation                                               2.0-3.0  Hypercoagulable State                                    2.0.2.3  Left Ventricular Asist Device                            2.0-3.0  Mechanical Heart Valve                                  -    Aortic(with afib, MI, embolism, HF, LA enlargement,    and/or coagulopathy)                                     2.0-3.0 (2.5-3.5)     Mitral                                                             2.5-3.5  Prosthetic/Bioprosthetic Heart Valve               2.0-3.0  Venous thromboembolism (VTE: VT, PE        2.0-3.0    Fingerstick Glucose (POCT) [[id number]]  (Normal) Collected: 12/26/24 0817    Lab Status: Final result Specimen: Blood Updated: 12/26/24 0819     POC Glucose 136 mg/dl             CT chest abdomen pelvis w contrast   Final Interpretation by Lydia Mahoney MD (12/26 7648)      No bowel obstruction   Cirrhosis with portal hypertension with a small amount of ascites.   Fluid-filled small bowel loops, nonspecific      Area of groundglass density left upper lung, stable, follow-up CT chest in 6 months for stability of the left upper lobe area of groundglass density      No acute consolidation   The study was marked in EPIC for significant notification.      Workstation performed: [id number]         CT head without contrast   Final Interpretation by Carson Mcneil MD (12/26 1019)      No acute intracranial abnormality.                   Workstation performed: [id number]         X-ray chest 1 view portable   Final Interpretation by Reece Vargas DO (12/26 1035)      Mild pulmonary vascular congestion.               Resident: Ysabel Proctor I, the attending radiologist, have reviewed the images and agree with the final report above.      Workstation performed: [id number]             Procedures    ED Medication and Procedure Management   Prior to Admission Medications   Prescriptions Last Dose Informant Patient Reported? Taking?   folic acid (FOLVITE) 1 mg tablet 12/25/2024  Yes Yes   Sig: Take 1 mg by mouth daily   gabapentin (NEURONTIN) 100 mg capsule Past Month Self No Yes   Sig: Take 2 capsules (200 mg total) by mouth 3 (three) times a day   hydroxychloroquine (PLAQUENIL) 200 mg tablet 12/25/2024  Yes Yes   Sig: Take 200 mg by mouth 2 (two) times a day with meals   lactulose (CHRONULAC) 10 g/15 mL solution 12/25/2024  No Yes   Sig: Take 30 mL (20 g total) by mouth 4 (four) times a day Goal bowel movements is 4 a day   lidocaine (LIDODERM) 5 % Past Week  No Yes   Sig: Apply 1 patch topically over 12 hours daily Remove & Discard patch within 12 hours or as directed by MD   nystatin (MYCOSTATIN) powder   No No   Sig: Apply topically 2 (two) times a day   ondansetron (ZOFRAN) 4 mg tablet   No No   Sig: Take 1 tablet (4 mg total) by mouth every 6 (six) hours   oxyCODONE-acetaminophen (PERCOCET)  mg per tablet 12/25/2024  Yes Yes   Sig: Take 1 tablet by mouth every 6 (six) hours as needed for moderate pain   pantoprazole (PROTONIX) 40 mg tablet 12/25/2024  No Yes   Sig: Take 1 tablet (40 mg total) by mouth daily in the early morning   sertraline (ZOLOFT) 100 mg tablet 12/25/2024  No Yes   Sig: Take 1 tablet (100 mg total) by mouth daily   spironolactone (ALDACTONE) 50 mg tablet 12/25/2024  No Yes   Sig: Take 1 tablet (50 mg total) by mouth daily   torsemide (DEMADEX) 20 mg tablet 12/25/2024  No Yes   Sig: Take 1 tablet  (20 mg total) by mouth daily      Facility-Administered Medications: None     Current Discharge Medication List        CONTINUE these medications which have NOT CHANGED    Details   folic acid (FOLVITE) 1 mg tablet Take 1 mg by mouth daily      gabapentin (NEURONTIN) 100 mg capsule Take 2 capsules (200 mg total) by mouth 3 (three) times a day  Qty: 180 capsule, Refills: 0    Associated Diagnoses: Sarcoidosis      hydroxychloroquine (PLAQUENIL) 200 mg tablet Take 200 mg by mouth 2 (two) times a day with meals      lactulose (CHRONULAC) 10 g/15 mL solution Take 30 mL (20 g total) by mouth 4 (four) times a day Goal bowel movements is 4 a day  Qty: 3600 mL, Refills: 0    Associated Diagnoses: Hepatic encephalopathy (HCC)      lidocaine (LIDODERM) 5 % Apply 1 patch topically over 12 hours daily Remove & Discard patch within 12 hours or as directed by MD  Qty: 9 patch, Refills: 0    Associated Diagnoses: Closed compression fracture of body of L1 vertebra (HCC); Sacral fracture (HCC)      oxyCODONE-acetaminophen (PERCOCET)  mg per tablet Take 1 tablet by mouth every 6 (six) hours as needed for moderate pain      pantoprazole (PROTONIX) 40 mg tablet Take 1 tablet (40 mg total) by mouth daily in the early morning  Qty: 30 tablet, Refills: 0    Associated Diagnoses: Hepatic encephalopathy (HCC)      sertraline (ZOLOFT) 100 mg tablet Take 1 tablet (100 mg total) by mouth daily  Qty: 30 tablet, Refills: 0    Associated Diagnoses: Sarcoidosis      spironolactone (ALDACTONE) 50 mg tablet Take 1 tablet (50 mg total) by mouth daily  Qty: 30 tablet, Refills: 0    Associated Diagnoses: CHF (congestive heart failure) (HCC)      torsemide (DEMADEX) 20 mg tablet Take 1 tablet (20 mg total) by mouth daily  Qty: 30 tablet, Refills: 0    Associated Diagnoses: CHF (congestive heart failure) (HCC)      nystatin (MYCOSTATIN) powder Apply topically 2 (two) times a day  Qty: 30 g, Refills: 0    Associated Diagnoses: Closed compression  fracture of L1 lumbar vertebra, sequela      ondansetron (ZOFRAN) 4 mg tablet Take 1 tablet (4 mg total) by mouth every 6 (six) hours  Qty: 20 tablet, Refills: 0    Associated Diagnoses: Nausea           No discharge procedures on file.  ED SEPSIS DOCUMENTATION   Time reflects when diagnosis was documented in both MDM as applicable and the Disposition within this note       Time User Action Codes Description Comment    12/26/2024 12:19 PM Thang Chirinos [E72.20] Hyperammonemia (HCC)     12/26/2024 12:19 PM Thang Chirinos [J11.1] Influenza                  Thang Chirinos DO  12/26/24 8884

## 2024-12-26 NOTE — ASSESSMENT & PLAN NOTE
Chronically anemic, baseline Hgb 9-10  Chronic thrombocytopenia, baseline around 70  With DVT prophylaxis, hold if platelet below 50

## 2024-12-27 LAB
ALBUMIN SERPL BCG-MCNC: 2.6 G/DL (ref 3.5–5)
ALP SERPL-CCNC: 172 U/L (ref 34–104)
ALT SERPL W P-5'-P-CCNC: 15 U/L (ref 7–52)
AMMONIA PLAS-SCNC: 80 UMOL/L (ref 18–72)
ANION GAP SERPL CALCULATED.3IONS-SCNC: 4 MMOL/L (ref 4–13)
AST SERPL W P-5'-P-CCNC: 31 U/L (ref 13–39)
BILIRUB SERPL-MCNC: 1.22 MG/DL (ref 0.2–1)
BUN SERPL-MCNC: 5 MG/DL (ref 5–25)
CALCIUM ALBUM COR SERPL-MCNC: 9.1 MG/DL (ref 8.3–10.1)
CALCIUM SERPL-MCNC: 8 MG/DL (ref 8.4–10.2)
CHLORIDE SERPL-SCNC: 108 MMOL/L (ref 96–108)
CO2 SERPL-SCNC: 31 MMOL/L (ref 21–32)
CREAT SERPL-MCNC: 0.64 MG/DL (ref 0.6–1.3)
ERYTHROCYTE [DISTWIDTH] IN BLOOD BY AUTOMATED COUNT: 16.5 % (ref 11.6–15.1)
GFR SERPL CREATININE-BSD FRML MDRD: 100 ML/MIN/1.73SQ M
GLUCOSE SERPL-MCNC: 109 MG/DL (ref 65–140)
HCT VFR BLD AUTO: 29.7 % (ref 34.8–46.1)
HGB BLD-MCNC: 9.7 G/DL (ref 11.5–15.4)
MAGNESIUM SERPL-MCNC: 1.8 MG/DL (ref 1.9–2.7)
MCH RBC QN AUTO: 31.5 PG (ref 26.8–34.3)
MCHC RBC AUTO-ENTMCNC: 32.7 G/DL (ref 31.4–37.4)
MCV RBC AUTO: 96 FL (ref 82–98)
PLATELET # BLD AUTO: 60 THOUSANDS/UL (ref 149–390)
PMV BLD AUTO: 9.9 FL (ref 8.9–12.7)
POTASSIUM SERPL-SCNC: 3 MMOL/L (ref 3.5–5.3)
PROT SERPL-MCNC: 6.1 G/DL (ref 6.4–8.4)
RBC # BLD AUTO: 3.08 MILLION/UL (ref 3.81–5.12)
SODIUM SERPL-SCNC: 143 MMOL/L (ref 135–147)
WBC # BLD AUTO: 2.32 THOUSAND/UL (ref 4.31–10.16)

## 2024-12-27 PROCEDURE — 97535 SELF CARE MNGMENT TRAINING: CPT

## 2024-12-27 PROCEDURE — 97163 PT EVAL HIGH COMPLEX 45 MIN: CPT

## 2024-12-27 PROCEDURE — 97116 GAIT TRAINING THERAPY: CPT

## 2024-12-27 PROCEDURE — 80053 COMPREHEN METABOLIC PANEL: CPT

## 2024-12-27 PROCEDURE — 83735 ASSAY OF MAGNESIUM: CPT

## 2024-12-27 PROCEDURE — 97167 OT EVAL HIGH COMPLEX 60 MIN: CPT

## 2024-12-27 PROCEDURE — 85027 COMPLETE CBC AUTOMATED: CPT

## 2024-12-27 PROCEDURE — 82140 ASSAY OF AMMONIA: CPT

## 2024-12-27 PROCEDURE — 99232 SBSQ HOSP IP/OBS MODERATE 35: CPT

## 2024-12-27 RX ORDER — MAGNESIUM SULFATE HEPTAHYDRATE 40 MG/ML
2 INJECTION, SOLUTION INTRAVENOUS ONCE
Status: COMPLETED | OUTPATIENT
Start: 2024-12-27 | End: 2024-12-27

## 2024-12-27 RX ORDER — POTASSIUM CHLORIDE 1500 MG/1
40 TABLET, EXTENDED RELEASE ORAL ONCE
Status: COMPLETED | OUTPATIENT
Start: 2024-12-27 | End: 2024-12-27

## 2024-12-27 RX ORDER — LACTULOSE 10 G/15ML
30 SOLUTION ORAL 3 TIMES DAILY
Status: DISCONTINUED | OUTPATIENT
Start: 2024-12-27 | End: 2024-12-28 | Stop reason: HOSPADM

## 2024-12-27 RX ORDER — LIDOCAINE 50 MG/G
1 PATCH TOPICAL DAILY
Status: DISCONTINUED | OUTPATIENT
Start: 2024-12-27 | End: 2024-12-28 | Stop reason: HOSPADM

## 2024-12-27 RX ADMIN — GABAPENTIN 200 MG: 100 CAPSULE ORAL at 15:32

## 2024-12-27 RX ADMIN — RIFAXIMIN 550 MG: 550 TABLET ORAL at 20:25

## 2024-12-27 RX ADMIN — MAGNESIUM SULFATE HEPTAHYDRATE 2 G: 40 INJECTION, SOLUTION INTRAVENOUS at 08:50

## 2024-12-27 RX ADMIN — LACTULOSE 30 G: 20 SOLUTION ORAL at 20:34

## 2024-12-27 RX ADMIN — LACTULOSE 30 G: 20 SOLUTION ORAL at 15:31

## 2024-12-27 RX ADMIN — OSELTAMAVIR PHOSPHATE 75 MG: 75 CAPSULE ORAL at 20:25

## 2024-12-27 RX ADMIN — HEPARIN SODIUM 5000 UNITS: 5000 INJECTION, SOLUTION INTRAVENOUS; SUBCUTANEOUS at 20:26

## 2024-12-27 RX ADMIN — FOLIC ACID 1 MG: 1 TABLET ORAL at 08:50

## 2024-12-27 RX ADMIN — TORSEMIDE 20 MG: 20 TABLET ORAL at 08:50

## 2024-12-27 RX ADMIN — GABAPENTIN 200 MG: 100 CAPSULE ORAL at 20:25

## 2024-12-27 RX ADMIN — HEPARIN SODIUM 5000 UNITS: 5000 INJECTION, SOLUTION INTRAVENOUS; SUBCUTANEOUS at 13:24

## 2024-12-27 RX ADMIN — OSELTAMAVIR PHOSPHATE 75 MG: 75 CAPSULE ORAL at 08:50

## 2024-12-27 RX ADMIN — HYDROXYCHLOROQUINE SULFATE 200 MG: 200 TABLET, FILM COATED ORAL at 09:08

## 2024-12-27 RX ADMIN — HYDROXYCHLOROQUINE SULFATE 200 MG: 200 TABLET, FILM COATED ORAL at 15:32

## 2024-12-27 RX ADMIN — SERTRALINE 100 MG: 100 TABLET, FILM COATED ORAL at 08:50

## 2024-12-27 RX ADMIN — SPIRONOLACTONE 50 MG: 25 TABLET ORAL at 08:50

## 2024-12-27 RX ADMIN — LACTULOSE 30 G: 20 SOLUTION ORAL at 08:50

## 2024-12-27 RX ADMIN — PANTOPRAZOLE SODIUM 40 MG: 40 TABLET, DELAYED RELEASE ORAL at 05:43

## 2024-12-27 RX ADMIN — GABAPENTIN 200 MG: 100 CAPSULE ORAL at 08:50

## 2024-12-27 RX ADMIN — HEPARIN SODIUM 5000 UNITS: 5000 INJECTION, SOLUTION INTRAVENOUS; SUBCUTANEOUS at 05:43

## 2024-12-27 RX ADMIN — LIDOCAINE 1 PATCH: 700 PATCH TOPICAL at 11:39

## 2024-12-27 RX ADMIN — POTASSIUM CHLORIDE 40 MEQ: 1500 TABLET, EXTENDED RELEASE ORAL at 08:50

## 2024-12-27 NOTE — OCCUPATIONAL THERAPY NOTE
Occupational Therapy Evaluation     Patient Name: Bailey Olsen  Today's Date: 12/27/2024  Problem List  Principal Problem:    Hepatic encephalopathy (HCC)  Active Problems:    Other cirrhosis of liver (HCC)    Class 3 severe obesity due to excess calories in adult (HCC)    Pancytopenia (HCC)    Hyperammonemia (HCC)    Intractable back pain    Influenza B    Past Medical History  Past Medical History:   Diagnosis Date    Back pain     Carcinoid tumor     neuroendocrine    CHF (congestive heart failure) (HCC)     Cirrhosis of liver (HCC)     Sarcoidosis      Past Surgical History  Past Surgical History:   Procedure Laterality Date    CHOLECYSTECTOMY      GASTRIC BYPASS      HERNIA REPAIR      IR KYPHOPLASTY/VERTEBROPLASTY  10/15/2024        12/27/24 1035   Note Type   Note type Evaluation   Pain Assessment   Pain Assessment Tool 0-10   Pain Score 7   Pain Location/Orientation Orientation: Lower;Location: Back   Restrictions/Precautions   Other Precautions Contact/isolation;Droplet precautions;Chair Alarm;Bed Alarm;Fall Risk;Pain   Home Living   Type of Home House  (1 FABRICIO)   Home Layout Two level;Performs ADLs on one level;Able to live on main level with bedroom/bathroom   Bathroom Shower/Tub Tub/shower unit   Bathroom Toilet Raised   Bathroom Equipment Grab bars in shower;Grab bars around toilet   Home Equipment Walker;Other (Comment)  (Rollator)   Additional Comments Pt reports living in a 2SH with FF setup and 1 FABRICIO. Pt reports not using AD for functional mobility in the home unless she absolutely needs it, in which case she would use the RW.   Prior Function   Level of [place] Independent with ADLs;Independent with functional mobility  (Intermittent assistance with socks)   Lives With Spouse;Daughter   Receives Help From Family   IADLs Independent with meal prep;Independent with medication management;Family/Friend/Other provides transportation   Falls in the last 6 months 5 to 10  (Pt reporting no falls-  however per chart has had at least 5)   Comments PTA, pt reports independence with ADLs, functional mobility, and most IADLs- has assistance for transportation.   ADL   UB Dressing Assistance 6  Modified independent   LB Dressing Assistance   (SBA)   LB Dressing Deficit Don/doff R sock;Don/doff L sock   Additional Comments Pt able to doff/don the R sock while seated in recliner chair via figure 4 technique. Overall, the pt can complete LB ADLs in the seated position with setup. UB ADLs at mod I. Would require SBA for ADLs in standing due to balance deficits.   Bed Mobility   Supine to Sit 5  Supervision   Additional items Bedrails   Additional Comments Pt received supine in bed upon start of session. Pt supine > sit EOB with supervision.   Transfers   Sit to Stand 5  Supervision   Additional items Increased time required;Verbal cues   Stand to Sit 5  Supervision   Additional items Increased time required;Verbal cues   Stand pivot   (Supervision/minimal assistance)   Additional items Assist x 1;Increased time required;Verbal cues   Additional Comments Functional transfers initially without AD. Pt spt varying between supervision and minimal assistance, intermittent LOBs and pt reaching out for additional external support.   Functional Mobility   Functional Mobility   (Supervision/minimal assistance)   Additional Comments Pt participated in short functional distance in room, walking to bathroom and varying between supervision and minimal assistance and no AD. Pt reaching out for additional external support.   Balance   Static Sitting Good   Dynamic Sitting Fair +   Static Standing Fair   Dynamic Standing Fair -   Activity Tolerance   Activity Tolerance Patient limited by fatigue   Medical Staff Made Aware Dianna FERRIS   Nurse Made Aware Annette WRAY Assessment   RUE Assessment WFL   LUE Assessment   LUE Assessment WFL   Hand Function   Gross Motor Coordination Functional   Fine Motor Coordination Functional   Hand  Function Comments Pt is R hand dominant.   Sensation   Light Touch No apparent deficits   Vision-Basic Assessment   Current Vision Wears contacts   Cognition   Overall Cognitive Status WFL   Arousal/Participation Alert;Cooperative   Attention Attends with cues to redirect   Orientation Level Oriented X4   Memory Within functional limits   Following Commands Follows one step commands with increased time or repetition   Comments Increased time to respond at times however demonstrates cognition that is WFL   Assessment   Limitation Decreased ADL status;Decreased Safe judgement during ADL;Decreased cognition;Decreased endurance;Decreased self-care trans;Decreased high-level ADLs   Prognosis Good   Assessment Pt is a 54 y.o. female, admitted to [facility] 12/26/2024 d/t experiencing generalized weakness. Dx: Hepatic encephalopathy. Pt with PMHx impacting their performance during ADL tasks, including: back pain, neuroendocrine carcinoid tumor, CHF, cirrhosis of liver, sarcoidosis, cholecystectomy, kyphoplasty/vertebroplasty 10/15/24, metabolic encephalopathy in the past due to elevated liver enzymes/ammonia levels. Prior to admission to the hospital Pt was performing ADLs without physical assistance. IADLs with physical assistance. Functional transfers/ambulation without physical assistance. Cognitive status PTA was WFL. OT order placed to assess Pt's ADLs, cognitive status, and performance during functional tasks in order to maximize safety and independence while making most appropriate d/c recommendations. PT/OT co-evaluation completed at this time d/t significant mobility deficits and safety concerns. Pt's clinical presentation is currently unstable/unpredictable given new onset deficits that affect Pt's occupational performance and ability to safely return to PLOF including decrease activity tolerance, decrease standing balance, decrease performance during ADL tasks, decrease cognition, decrease safety awareness , increased  pain, decrease generalized strength, decrease activity engagement, decrease performance during functional transfers, steps to enter home, limited family support, frequent falls, high fall risk, and limited insight to deficits combined with medical complications of abnormal H&H, abnormal CBC, abnormal potassium values, multiple readmissions, fear/retreat, and need for input for mobility technique/safety. Personal factors affecting Pt at time of initial evaluation include: step(s) to enter environment, multi-level environment, limited home support, inability to perform IADLs, limited insight into impairments, decreased initiation and engagement, recent fall(s)/fall history, and questionable non-compliance. Pt will benefit from continued skilled OT services to address deficits as defined above and to maximize level independence/participation during ADLs and functional tasks to facilitate return toward PLOF and improved quality of life. From an occupational therapy standpoint, Level III (Minimum Resource Intensity) is recommended upon d/c.   Goals   Patient Goals to go home   Plan   Treatment Interventions ADL retraining;Functional transfer training;UE strengthening/ROM;Endurance training;Cognitive reorientation;Patient/family training;Equipment evaluation/education;Compensatory technique education;Continued evaluation;Energy conservation;Activityengagement   Goal Expiration Date 01/10/25   OT Treatment Day 1   OT Frequency 2-3x/wk   Discharge Recommendation   Rehab Resource Intensity Level, OT III (Minimum Resource Intensity)   AM-PAC Daily Activity Inpatient   Lower Body Dressing 3   Bathing 3   Toileting 3   Upper Body Dressing 4   Grooming 4   Eating 4   Daily Activity Raw Score 21   Daily Activity Standardized Score (Calc for Raw Score >=11) 44.27   AM-PAC Applied Cognition Inpatient   Following a Speech/Presentation 2   Understanding Ordinary Conversation 4   Taking Medications 3   Remembering Where Things Are  Placed or Put Away 3   Remembering List of 4-5 Errands 2   Taking Care of Complicated Tasks 2   Applied Cognition Raw Score 16   Applied Cognition Standardized Score 35.03   Additional Treatment Session   Start Time 1051   End Time 1059   Treatment Assessment Pt tolerated treatment fairly but is limited by fatigue. She will benefit from continued skilled OT services to maximize her independence with ADLs and functional mobility. All functional transfers without AD. Pt participated in functional transfer to/from standard toilet with supervision. Pt urinated while seated on toilet. Pt participated in pericare in seated and in standing with supervision. Clothing management up/down with supervision. Pt washed/dried B hands while standing at sink with supervision.   End of Consult   Patient Position at End of Consult Bedside chair;Bed/Chair alarm activated;All needs within reach     The patient's raw score on the AM-PAC Daily Activity Inpatient Short Form is 21. A raw score of greater than or equal to 19 suggests the patient may benefit from discharge to home. Please refer to the recommendation of the Occupational Therapist for safe discharge planning.    Pt goals to be met by 1/10/2025:    Pt will demonstrate ability to complete LB dressing @ mod I in order to increase safety and independence during meaningful tasks.   Pt will demonstrate ability to iain/doff socks/shoes while sitting EOB/chair @ mod I in order to increase safety and independence during meaningful tasks.   Pt will demonstrate ability to complete toileting tasks including CM and pericare @ mod I in order to increase safety and independence during meaningful tasks.  Pt will demonstrate ability to complete EOB, chair, toilet/commode transfers @ mod I in order to increase performance and participation during functional tasks.  Pt will demonstrate ability to stand for 10 minutes while maintaining F+ balance with use of SPC for UB support PRN.  Pt will  demonstrate ability to tolerate 30 minute OT session with no vc'ing for deep breathing or use of energy conservation techniques in order to increase activity tolerance during functional tasks.   Pt will demonstrate Good carryover of use of energy conservation/compensatory strategies during ADLs and functional tasks in order to increase safety and reduce risk for falls.   Pt will demonstrate Good attention and participation in continued evaluation of functional ambulation house hold distances in order to assist with safe d/c planning.  Pt will attend to continued cognitive assessments 100% of the time in order to provide most appropriate d/c recommendations.   Pt will follow 100% simple 2-step commands and be A&O x4 consistently with environmental cues to increase participation in functional activities.   Pt will identify 3 areas of interest/hobbies and 1 intervention on how to incorporate into daily life in order to increase interaction with environment and peers as well as increase participation in meaningful tasks.   Pt will demonstrate 100% carryover of BUE HEP in order to increase BUE MS and increase performance during functional tasks upon d/c home.    Ryan Farah MS, OTR/L

## 2024-12-27 NOTE — PLAN OF CARE
Problem: PHYSICAL THERAPY ADULT  Goal: Performs mobility at highest level of function for planned discharge setting.  See evaluation for individualized goals.  Description: Treatment/Interventions: ADL retraining, Functional transfer training, LE strengthening/ROM, Elevations, Therapeutic exercise, Endurance training, Patient/family training, Equipment eval/education, Bed mobility, Gait training, Compensatory technique education, Spoke to nursing, Spoke to case management, OT  Equipment Recommended:  (walker-pt has)       See flowsheet documentation for full assessment, interventions and recommendations.  Note: Prognosis: Good  Problem List: Decreased strength, Decreased endurance, Impaired balance, Decreased mobility, Decreased safety awareness, Obesity, Pain  Assessment: Pt is a 54 y.o. female seen for PT evaluation s/p admission to [facility] on 12/26/2024 with Hepatic encephalopathy (HCC).  Order placed for PT services.  Upon evaluation: Pt is presenting with impaired functional mobility due to pain, decreased strength, decreased endurance, impaired balance, gait deviations, decreased safety awareness, fall risk, and LE edema requiring  supervision assistance for bed mobility, supervision to minimal assistance for transfers, and supervision to minimal assistance for ambulation with out AD . Pt's clinical presentation is currently unpredictable given the functional mobility deficits above, especially weakness, edema of extremities, decreased endurance, impaired balance, gait deviations, pain, decreased activity tolerance, decreased functional mobility tolerance, and decreased safety awareness, coupled with fall risks as indicated by AM-PAC 6-Clicks: 18/24 as well as hx of falls, impaired balance, polypharmacy, decreased safety awareness, and obesity and combined with medical complications of pain impacting overall mobility status, abnormal H&H, abnormal WBCs, abnormal potassium values,  readmission to hospital, need for input for mobility technique/safety, and abnormal ammonia, Flu B, hepatic encephalopathy .  Pt's PMHx and comorbidities that may affect physical performance and progress include: obesity and cirrhosis of liver, pancytopenia, intractable back pain,hepatic encephalopathy, neuroendocrine carcinoid tumor, CHF, sarcoidosis  . Personal factors affecting pt at time of IE include: step(s) to enter environment, inability to perform IADLs, inability to perform ADLs, inability to navigate level surfaces without external assistance, inability to ambulate household distances, and recent fall(s)/fall history. Pt will benefit from continued skilled PT services to address deficits as defined above and to maximize level of functional mobility to facilitate return toward PLOF and improved QOL. From PT/mobility standpoint, recommendation at time of d/c would be Level III (Minimum Resource Intensity), with family and/or caregiver support, and with walker in order to reduce fall risk and maximize pt's functional independence and consistency with mobility. Recommend trial with walker next 1-2 sessions and ther ex next 1-2 sessions.     Barriers to Discharge Comments: pt has support from family prn  Rehab Resource Intensity Level, PT: III (Minimum Resource Intensity) (HHPT)    See flowsheet documentation for full assessment.

## 2024-12-27 NOTE — SEPSIS NOTE
Sepsis Note   Bailey Olsen 54 y.o. female MRN: [id number]  Unit/Bed#: -01 Encounter: [id number]       Initial Sepsis Screening       Row Name 12/27/24 1023                Is the patient's history suggestive of a new or worsening infection? No  -                  User Key  (r) = Recorded By, (t) = Taken By, (c) = Cosigned By      Initials Name Provider Type     Silva Giron PA-C Physician Assistant                        Body mass index is 46.37 kg/m².  Wt Readings from Last 1 Encounters:   12/26/24 126 kg (278 lb 10.6 oz)        Ideal body weight: 57 kg (125 lb 10.6 oz)  Adjusted ideal body weight: 84.8 kg (186 lb 13.8 oz)

## 2024-12-27 NOTE — PLAN OF CARE
Problem: PAIN - ADULT  Goal: Verbalizes/displays adequate comfort level or baseline comfort level  Description: Interventions:  - Encourage patient to monitor pain and request assistance  - Assess pain using appropriate pain scale  - Administer analgesics based on type and severity of pain and evaluate response  - Implement non-pharmacological measures as appropriate and evaluate response  - Consider cultural and social influences on pain and pain management  - Notify physician/advanced practitioner if interventions unsuccessful or patient reports new pain  Outcome: Progressing     Problem: INFECTION - ADULT  Goal: Absence or prevention of progression during hospitalization  Description: INTERVENTIONS:  - Assess and monitor for signs and symptoms of infection  - Monitor lab/diagnostic results  - Monitor all insertion sites, i.e. indwelling lines, tubes, and drains  - Monitor endotracheal if appropriate and nasal secretions for changes in amount and color  - [place] appropriate cooling/warming therapies per order  - Administer medications as ordered  - Instruct and encourage patient and family to use good hand hygiene technique  - Identify and instruct in appropriate isolation precautions for identified infection/condition  Outcome: Progressing  Goal: Absence of fever/infection during neutropenic period  Description: INTERVENTIONS:  - Monitor WBC    Outcome: Progressing     Problem: SAFETY ADULT  Goal: Patient will remain free of falls  Description: INTERVENTIONS:  - Educate patient/family on patient safety including physical limitations  - Instruct patient to call for assistance with activity   - Consult OT/PT to assist with strengthening/mobility   - Keep Call bell within reach  - Keep bed low and locked with side rails adjusted as appropriate  - Keep care items and personal belongings within reach  - Initiate and maintain comfort rounds  - Make Fall Risk Sign visible to staff  - Offer Toileting every  Hours,  in advance of need  - Initiate/Maintain alarm  - Obtain necessary fall risk management equipment:   - Apply yellow socks and bracelet for high fall risk patients  - Consider moving patient to room near nurses station  Outcome: Progressing  Goal: Maintain or return to baseline ADL function  Description: INTERVENTIONS:  -  Assess patient's ability to carry out ADLs; assess patient's baseline for ADL function and identify physical deficits which impact ability to perform ADLs (bathing, care of mouth/teeth, toileting, grooming, dressing, etc.)  - Assess/evaluate cause of self-care deficits   - Assess range of motion  - Assess patient's mobility; develop plan if impaired  - Assess patient's need for assistive devices and provide as appropriate  - Encourage maximum independence but intervene and supervise when necessary  - Involve family in performance of ADLs  - Assess for home care needs following discharge   - Consider OT consult to assist with ADL evaluation and planning for discharge  - Provide patient education as appropriate  Outcome: Progressing  Goal: Maintains/Returns to pre admission functional level  Description: INTERVENTIONS:  - Perform AM-PAC 6 Click Basic Mobility/ Daily Activity assessment daily.  - Set and communicate daily mobility goal to care team and patient/family/caregiver.   - Collaborate with rehabilitation services on mobility goals if consulted  - Perform Range of Motion  times a day.  - Reposition patient every hours.  - Dangle patient  times a day  - Stand patient  times a day  - Ambulate patient  times a day  - Out of bed to chair  times a day   - Out of bed for meals  times a day  - Out of bed for toileting  - Record patient progress and toleration of activity level   Outcome: Progressing     Problem: DISCHARGE PLANNING  Goal: Discharge to home or other facility with appropriate resources  Description: INTERVENTIONS:  - Identify barriers to discharge w/patient and caregiver  - Arrange for  needed discharge resources and transportation as appropriate  - Identify discharge learning needs (meds, wound care, etc.)  - Arrange for interpretive services to assist at discharge as needed  - Refer to Case Management Department for coordinating discharge planning if the patient needs post-hospital services based on physician/advanced practitioner order or complex needs related to functional status, cognitive ability, or social support system  Outcome: Progressing     Problem: Knowledge Deficit  Goal: Patient/family/caregiver demonstrates understanding of disease process, treatment plan, medications, and discharge instructions  Description: Complete learning assessment and assess knowledge base.  Interventions:  - Provide teaching at level of understanding  - Provide teaching via preferred learning methods  Outcome: Progressing     Problem: Prexisting or High Potential for Compromised Skin Integrity  Goal: Skin integrity is maintained or improved  Description: INTERVENTIONS:  - Identify patients at risk for skin breakdown  - Assess and monitor skin integrity  - Assess and monitor nutrition and hydration status  - Monitor labs   - Assess for incontinence   - Turn and reposition patient  - Assist with mobility/ambulation  - Relieve pressure over bony prominences  - Avoid friction and shearing  - Provide appropriate hygiene as needed including keeping skin clean and dry  - Evaluate need for skin moisturizer/barrier cream  - Collaborate with interdisciplinary team   - Patient/family teaching  - Consider wound care consult   Outcome: Progressing

## 2024-12-27 NOTE — CONSULTS
Consultation - [facility]  Bailey Olsen 54 y.o. 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 It appears she is dehydrated, which is worsening her constipation and contributing to a mild degree of hepatic encephalopathy.    I agree with the current management plan, which includes using lactulose and minimizing narcotics to manage her constipation.    I recommend resuming her outpatient Xifaxan to help with recurrent hepatic encephalopathy.    I educated the patient on the importance of avoiding hepatotoxic agents and managing her weight, if possible.  I emphasized on the importance of following with pain management to assist with weaning her off the narcotics if possible as I believe this is her a contributing factor for her recurrent presentation to the hospital and some degree of the imbalance and recurrent falls.    I would like to see her in the office for liver cirrhosis management.    I agree with continuing her current diuretic dose, with close monitoring of kidney function and urine output.    Patient verbalized understanding and agreement.                  Reason for Consult / Principal Problem: Liver cirrhosis and hepatic encephalopathy    HPI: Bailey Olsen is a 54 y.o. year old female who presents with history of liver cirrhosis who was admitted to hospital due to fatigue and concern for hepatic encephalopathy due to elevated ammonia level.  GI consult was requested for further evaluation and recommendation    In detail, patient had a history of  liver cirrhosis that was attributed to nonalcoholic fatty liver disease or MASLD, history of spinal fracture, on chronic narcotics, constipation, history of neuroendocrine tumor status post debulking surgery with residual primary tumor as it was deemed on removable on long-acting octreotide monthly, recurrent hospitalization due to fall and concern for hepatic encephalopathy giving the elevated ammonia who reports that she has not been feeling well and fatigued rendering her dehydrated and not moving her bowels at home hence she was brought to emergency room.  Upon presentation, she was tested positive for influenza B and noted to have elevated ammonia level.  Patient was admitted to the hospital and started on lactulose and since admission she has been having multiple loose bowel movements.    During my encounter, patient indicates that she feels some improvement compared to her presentation and she is awake and alert and oriented.  She has no obvious tremors or asterixis.    Patient denied all the following:  Jaundice, itching, nausea or vomiting, dark urine, noe color stool, weight loss, night sweat,  easy bruising, abdominal pain or distention, Known personal or family liver disease, heavy or chronic EtOH use, IV drug use, receiving blood product transfusion, skin rash, tick bites, incarceration, known HIV infection, hemodialysis, known metabolic liver disease, herbal substance use,  recent antibiotic use or starting new medication, autoimmune disorders, history of trauma, history of prior clot or hypercoagulable status, personal or family history of IBD or GI malignancy.    She was not sure about any prior workup for her liver cirrhosis however she denied having any liver biopsies the past.    She believes that her daughter might be exposed to the flu and she caught it from her and she is homebound most of the time.      ast EGD 3/19/24 - grade 1 esophageal varices, repeat EGD 1 year.   Last Colonoscopy  3/19/24 - 1 polyp + internal hemorrhoids, repeat 2034.               Past Medical History:   Diagnosis Date    Back pain     Carcinoid tumor     neuroendocrine    CHF (congestive heart failure) (HCC)     Cirrhosis of liver (HCC)     Sarcoidosis      Past Surgical History:   Procedure Laterality Date    CHOLECYSTECTOMY      GASTRIC BYPASS      HERNIA REPAIR      IR KYPHOPLASTY/VERTEBROPLASTY  10/15/2024     Social History   Social History     Substance and Sexual Activity   Alcohol Use Never     Social History     Substance and Sexual Activity   Drug Use Never     Social History     Tobacco Use   Smoking Status Never   Smokeless Tobacco Never       Family History   Problem Relation Age of Onset    Hypertension Father          Medications Prior to Admission:     folic acid (FOLVITE) 1 mg tablet    gabapentin (NEURONTIN) 100 mg capsule    hydroxychloroquine (PLAQUENIL) 200 mg tablet    lactulose (CHRONULAC) 10 g/15 mL solution    lidocaine (LIDODERM) 5 %    oxyCODONE-acetaminophen (PERCOCET)  mg per tablet    pantoprazole (PROTONIX) 40 mg tablet    sertraline (ZOLOFT) 100 mg tablet    spironolactone (ALDACTONE) 50 mg tablet    torsemide (DEMADEX) 20 mg tablet    nystatin (MYCOSTATIN) powder    ondansetron (ZOFRAN) 4 mg tablet    Current Facility-Administered Medications:     folic acid (FOLVITE) tablet 1 mg, Daily    gabapentin (NEURONTIN) capsule 200 mg, TID    heparin (porcine) subcutaneous injection 5,000 Units, Q8H MIGUEL    hydroxychloroquine (PLAQUENIL) tablet 200 mg, BID With Meals    lactulose (CHRONULAC) oral solution 30 g, TID    lidocaine (LIDODERM) 5 % patch 1 patch, Daily    oseltamivir (TAMIFLU) capsule 75 mg, Q12H MIGUEL    pantoprazole (PROTONIX) EC tablet 40 mg, Early Morning    sertraline (ZOLOFT) tablet 100 mg, Daily    spironolactone (ALDACTONE) tablet 50 mg, Daily    torsemide (DEMADEX) tablet 20 mg, Daily    trimethobenzamide (TIGAN) IM injection 200 mg, Q6H PRN  Allergies   Allergen Reactions     Zolpidem GI Intolerance, Other (See Comments) and Hives     Other Reaction(s): Other      Blacks out    Blacks out    Tapentadol Sneezing and Other (See Comments)     Zoned out    Alprazolam Other (See Comments)     Other Reaction(s): Other      Feels zoned out    Feels zoned out    Amoxicillin-Pot Clavulanate Hives and Nausea Only     Amoxil fine    Dust Mite Extract Sneezing    Morphine Headache, Nausea Only and Vomiting    Tramadol GI Intolerance    Azelastine Rash    Azelastine Hcl Rash    Molds & Smuts Rash     Other Reaction(s): Unknown               PHYSICAL EXAM   Most Recent Vital Signs:  Vitals:    12/26/24 1601 12/26/24 2100 12/26/24 2159 12/27/24 0725   BP: 137/73  146/78 148/82   BP Location: Right arm      Pulse: 89  95 92   Resp: 18   18   Temp: 97.5 °F (36.4 °C)   (!) 96.8 °F (36 °C)   TempSrc: Temporal      SpO2: 97% 95% 95% 100%   Weight:           Intake/Output Summary (Last 24 hours) at 12/27/2024 1613  Last data filed at 12/27/2024 1609  Gross per 24 hour   Intake 1860 ml   Output 1700 ml   Net 160 ml     Physical Exam    Physical Exam  Constitutional:       Appearance: She is obese.   Eyes:      Conjunctiva/sclera: Conjunctivae normal.   Cardiovascular:      Rate and Rhythm: Normal rate.   Pulmonary:      Effort: Pulmonary effort is normal.   Abdominal:      General: Bowel sounds are normal. There is distension.      Palpations: Abdomen is soft.      Comments: scar   Neurological:      General: No focal deficit present.      Mental Status: She is alert.      Comments: No asterixis.  Weak   Psychiatric:         Mood and Affect: Mood normal.            LABS/IMAGING  Lab Results: I have reviewed all relevant lab results during this hospitalization.    Imaging Studies:  I have reviewed all the relevant images during this hospitalizations      Counseling / Coordination of Care  Total time spent today 1 hour. Greater than 50% of total time was spent with the patient and / or family counseling and /  or coordination of care.      Cheryl Thompson MD

## 2024-12-27 NOTE — UTILIZATION REVIEW
Initial Clinical Review    Admission: Date/Time/Statement:   Admission Orders (From admission, onward)       Ordered        12/26/24 1221  INPATIENT ADMISSION  Once                          Orders Placed This Encounter   Procedures    INPATIENT ADMISSION     Standing Status:   Standing     Number of Occurrences:   1     Level of Care:   Med Surg [16]     Estimated length of stay:   More than 2 Midnights     Certification:   I certify that inpatient services are medically necessary for this patient for a duration of greater than two midnights. See H&P and MD Progress Notes for additional information about the patient's course of treatment.     ED Arrival Information       Expected   12/26/2024 08:05    Arrival   12/26/2024 08:05    Acuity   Urgent              Means of arrival   Ambulance    Escorted by   [facility]    Service   Hospitalist    Admission type   Emergency              Arrival complaint   Weakness             Chief Complaint   Patient presents with    Weakness - Generalized     Patient feels weak and shaky, believes her liver enzymes (ammonia) levels are elevated when they are elevated with confusion, nausea (states she has been taking them) and disoriented. She called EMS herself.        Initial Presentation: 54 y.o. female to ED via EMS from home  Present to ED with altered mental status. Patient states this morning she was feeling off and not herself. States that she has been dry heaving, and this is how she gets when her ammonia level is high.    PMHX cirrhosis, CHF, obesity, hepatic encephalopathy;  Carcinoid tumor (neuroendocrine)   Admitted to [facility] with DX: Hepatic encephalopathy   on exam: orientation - slow to respond; T 99.3; tachy; WBC 2.46; K 3.2; Ammonia 214; Did not respond to po lactulose in ED; (+) influenza B   CT CAP with cirrhosis, portal HTN and small amount of ascites.   PLAN: cont lactulose TID; lactulose retention enema; monitor labs; neuro checks; cont tamiflu      Anticipated  Length of Stay/Certification Statement: Patient will be admitted on an inpatient basis with an anticipated length of stay of greater than 2 midnights secondary to acute hepatic encephalopathy requiring lactulose .       Date: 12/27/24      Day 2   Patient states that she is feeling well today   Exam: WBC 2.32; Mg 1.8; ammonia 80; K 3.0  Require additional inpatient hospital stay due to requiring lactulose for hyperammonemia and monitoring mental status   Plan: cont lactulose TID; monitor labs; neuro checks; cont tamiflu        ED Treatment-Medication Administration from 12/26/2024 0805 to 12/26/2024 1302         Date/Time Order Dose Route Action     12/26/2024 0855 lactulose (CHRONULAC) oral solution 20 g 20 g Oral Given     12/26/2024 0841 sodium chloride 0.9 % bolus 1,000 mL 1,000 mL Intravenous New Bag     12/26/2024 0842 ondansetron (ZOFRAN) injection 4 mg 4 mg Intravenous Given     12/26/2024 1001 iohexol (OMNIPAQUE) 350 MG/ML injection (MULTI-DOSE) 100 mL 100 mL Intravenous Given     12/26/2024 1148 ondansetron (ZOFRAN) injection 4 mg 4 mg Intravenous Given     12/26/2024 1208 potassium chloride 20 mEq IVPB (premix) 20 mEq Intravenous New Bag            Scheduled Medications:  folic acid, 1 mg, Oral, Daily  gabapentin, 200 mg, Oral, TID  heparin (porcine), 5,000 Units, Subcutaneous, Q8H MIGUEL  hydroxychloroquine, 200 mg, Oral, BID With Meals  lactulose, 30 g, Oral, TID  oseltamivir, 75 mg, Oral, Q12H MIGUEL (Tamiflu)  pantoprazole, 40 mg, Oral, Early Morning  sertraline, 100 mg, Oral, Daily  spironolactone, 50 mg, Oral, Daily  torsemide, 20 mg, Oral, Daily    lactulose retention enema 200 g  Dose: 200 g  Freq: Once Route: RE  Start: 12/26/24 1315 End: 12/26/24 1326    magnesium sulfate 2 g/50 mL IVPB (premix) 2 g  Dose: 2 g  Freq: Once Route: IV  Last Dose: 2 g (12/27/24 0850)  Start: 12/27/24 0815 End: 12/27/24 1050      Continuous IV Infusions:  None         PRN Meds:  trimethobenzamide, 200 mg, Intramuscular,  Q6H PRN      ED Triage Vitals [12/26/24 0809]   Temperature Pulse Respirations Blood Pressure SpO2 Pain Score   99.3 °F (37.4 °C) 105 16 152/78 98 % No Pain     Weight (last 2 days)       Date/Time Weight    12/26/24 0809 126 (278.66)            Vital Signs (last 3 days)       Date/Time Temp Pulse Resp BP MAP (mmHg) SpO2 O2 Device Patient Position - Orthostatic VS [place] Coma Scale Score Pain    12/27/24 1035 -- -- -- -- -- -- -- -- -- 7    12/27/24 0900 -- -- -- -- -- -- None (Room air) -- 15 No Pain    12/27/24 07:25:17 96.8 °F (36 °C) 92 18 148/82 104 100 % -- -- -- --    12/26/24 21:59:05 -- 95 -- 146/78 101 95 % -- -- -- No Pain    12/26/24 2100 -- -- -- -- -- 95 % None (Room air) -- 15 --    12/26/24 1601 97.5 °F (36.4 °C) 89 18 137/73 -- 97 % None (Room air) Lying -- --    12/26/24 1317 -- -- -- -- -- -- None (Room air) -- -- No Pain    12/26/24 13:05:19 97.2 °F (36.2 °C) 92 17 144/78 100 97 % None (Room air) Lying -- --    12/26/24 1230 -- 95 13 135/72 98 95 % -- -- -- --    12/26/24 1215 -- 94 16 139/76 103 96 % -- -- -- --    12/26/24 1200 -- 94 15 138/75 101 95 % -- -- -- --    12/26/24 1145 -- 98 19 -- -- 96 % -- -- -- --    12/26/24 1130 -- 96 15 -- -- 94 % -- -- -- --    12/26/24 1115 -- 100 16 -- -- 95 % -- -- -- --    12/26/24 1100 -- 101 18 136/81 -- 96 % None (Room air) Lying -- --    12/26/24 0945 -- 101 -- -- -- 97 % -- -- -- --    12/26/24 0900 -- 101 19 145/69 94 99 % -- -- 15 --    12/26/24 0830 -- 100 13 142/67 97 98 % -- -- -- --    12/26/24 0815 -- 100 15 140/71 97 98 % -- -- -- --    12/26/24 0809 99.3 °F (37.4 °C) 105 16 152/78 -- 98 % None (Room air) Lying -- No Pain              Pertinent Labs/Diagnostic Test Results:   Radiology:  CT chest abdomen pelvis w contrast   Final Interpretation by Lydia Mahoney MD (12/26 1128)      No bowel obstruction   Cirrhosis with portal hypertension with a small amount of ascites.   Fluid-filled small bowel loops, nonspecific      Area of  groundglass density left upper lung, stable, follow-up CT chest in 6 months for stability of the left upper lobe area of groundglass density      No acute consolidation   The study was marked in EPIC for significant notification.      Workstation performed: [id number]         CT head without contrast   Final Interpretation by Carson Mcneil MD (12/26 1019)      No acute intracranial abnormality.                  Workstation performed: [id number]         X-ray chest 1 view portable   Final Interpretation by Reece Vargas DO (12/26 1035)      Mild pulmonary vascular congestion.               Resident: Ysabel Proctor I, the attending radiologist, have reviewed the images and agree with the final report above.      Workstation performed: [id number]           Cardiology:  ECG 12 lead   Final Result by Ruddy Shabazz Jr., MD (12/26 0801)   Sinus tachycardia   Minimal voltage criteria for LVH, may be normal variant ( R in aVL )   Nonspecific T wave abnormality   Abnormal ECG   When compared with ECG of 24-Nov-2024 20:04,   No significant change was found   Confirmed by Ruddy Shabazz ([id number]) on 12/26/2024 8:40:30 AM           Results from last 7 days   Lab Units 12/27/24  0542 12/26/24  0854   WBC Thousand/uL 2.32* 2.46*   HEMOGLOBIN g/dL 9.7* 10.5*   HEMATOCRIT % 29.7* 32.4*   PLATELETS Thousands/uL 60* 67*   TOTAL NEUT ABS Thousands/µL  --  1.62*        Results from last 7 days   Lab Units 12/27/24  0542 12/26/24  0854   SODIUM mmol/L 143 138   POTASSIUM mmol/L 3.0* 3.2*   CHLORIDE mmol/L 108 97   CO2 mmol/L 31 35*   ANION GAP mmol/L 4 6   BUN mg/dL 5 7   CREATININE mg/dL 0.64 0.86   EGFR ml/min/1.73sq m 100 76   CALCIUM mg/dL 8.0* 8.1*   MAGNESIUM mg/dL 1.8*  --      Results from last 7 days   Lab Units 12/27/24  0542 12/26/24  0854   AST U/L 31 36   ALT U/L 15 16   ALK PHOS U/L 172* 200*   TOTAL PROTEIN g/dL 6.1* 7.0   ALBUMIN g/dL 2.6* 3.0*   TOTAL BILIRUBIN mg/dL 1.22* 1.24*   AMMONIA umol/L 80* 214*      Results from last 7 days   Lab Units 12/26/24  0817   POC GLUCOSE mg/dl 136     Results from last 7 days   Lab Units 12/27/24  0542 12/26/24  0854   GLUCOSE RANDOM mg/dL 109 134        Results from last 7 days   Lab Units 12/26/24  0854   PROTIME seconds 16.7*   INR  1.31*   PTT seconds 34        Results from last 7 days   Lab Units 12/26/24  0854   BNP pg/mL 15        Results from last 7 days   Lab Units 12/26/24  0854   LIPASE u/L 9*             Past Medical History:   Diagnosis Date    Back pain     Carcinoid tumor     neuroendocrine    CHF (congestive heart failure) (HCC)     Cirrhosis of liver (HCC)     Sarcoidosis      Present on Admission:   Class 3 severe obesity due to excess calories in adult (HCC)   Hepatic encephalopathy (HCC)   Hyperammonemia (HCC)   Other cirrhosis of liver (HCC)   Pancytopenia (HCC)      Admitting Diagnosis: Hyperammonemia (HCC) [E72.20]  Influenza [J11.1]  Weakness generalized [R53.1]  Age/Sex: 54 y.o. female    Network Utilization Review Department  ATTENTION: Please call with any questions or concerns to [phone] and carefully listen to the prompts so that you are directed to the right person. All voicemails are confidential.   For Discharge needs, contact Care Management DC Support Team at [phone] opt. 2  Send all requests for admission clinical reviews, approved or denied determinations and any other requests to dedicated fax number below belonging to the campus where the patient is receiving treatment. List of dedicated fax numbers for the Facilities:  FACILITY NAME UR FAX NUMBER   ADMISSION DENIALS (Administrative/Medical Necessity) [phone]   DISCHARGE SUPPORT TEAM (NETWORK) [phone]   PARENT CHILD HEALTH (Maternity/NICU/Pediatrics) [phone]   [facility] [phone]   [facility] [phone]   [facility] [phone]   [facility] [phone]    [facility] [phone]   [facility] [phone]   [facility] [phone]   [facility] [phone]   [facility] [phone]   [facility] [phone]   [facility] [phone]   [facility] [phone]

## 2024-12-27 NOTE — CASE MANAGEMENT
Case Management Assessment & Discharge Planning Note    Patient name Bailey Olsen  Location /-01 MRN [id number]  : 1970 Date 2024       Current Admission Date: 2024  Current Admission Diagnosis:Hepatic encephalopathy (HCC)   Patient Active Problem List    Diagnosis Date Noted Date Diagnosed    Influenza B 2024     Sacral fracture, closed (HCC) 11/15/2024     KISHORE (acute kidney injury) (HCC) 10/04/2024     Morbid obesity with BMI of 40.0-44.9, adult (HCC) 10/04/2024     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) 2024     Fall from standing 2024     Closed compression fracture of L1 lumbar vertebra, sequela 2024     Closed stable burst fracture of first lumbar vertebra with routine healing 2024     Groin hematoma 2024     Ambulatory dysfunction 2024     Intractable back pain 2024     Hyperammonemia (HCC) 2024     Hepatic encephalopathy (HCC) 2024     Fall 2024     Other cirrhosis of liver (HCC) 2024     History of malignant carcinoid tumor of small intestine 2024     Class 3 severe obesity due to excess calories in adult (HCC) 2024     Primary hypertension 2024     Anxiety 2024     Hypokalemia 2024     Fibromyalgia 2024     Iron deficiency anemia 2024     Pancytopenia (HCC) 2024     Sarcoidosis 2024     Bilateral pulmonary infiltrates 2024       LOS (days): 1  Geometric Mean LOS (GMLOS) (days):   Days to GMLOS:     OBJECTIVE:  PATIENT READMITTED TO HOSPITAL  Risk of Unplanned Readmission Score: 47.44         Current admission status: Inpatient  Referral Reason: Other (Disposition Planning)    Preferred Pharmacy:   [facility] - [place] - [address] [facility]  [address] [facility]   Phone: [phone] Fax: [phone]    [facility]/pharmacy #[id number] - [place] - [facility]  [place] [address]  Phone: [phone] Fax: [phone]    Primary Care Provider: Nayely Brown DO    Primary Insurance: BLUE CROSS  Secondary Insurance:     ASSESSMENT:  Active Health Care Proxies       Cristian Olsen Health Care Representative - Spouse   Primary Phone: [phone] (Mobile)                 Advance Directives  Does patient have a Health Care POA?: No  Was patient offered paperwork?: Yes (pt declined)  Does patient currently have a Health Care decision maker?: Yes, please see Health Care Proxy section  Does patient have Advance Directives?: No  Was patient offered paperwork?: Yes (pt declined)  Primary Contact: Cristian Olsen, spouse         Readmission Root Cause  30 Day Readmission: Yes  During your hospital stay, did someone (provider, nurse, ) explain your care to you in a way you could understand?: Yes  Did you feel medically stable to leave the hospital?: Yes  Were you able to pay for your medication at the pharmacy?: Yes  Did you have reliable transportation to take you to your appointments?: Yes  During previous admission, was a post-acute recommendation made?: No  Patient was readmitted due to: fatigue, weakness, confusion    Patient Information  Admitted from:: Home  Mental Status: Alert  During Assessment patient was accompanied by: Not accompanied during assessment  Assessment information provided by:: Patient  Primary Caregiver: Self  Support Systems: Spouse/significant other, Daughter  County of Residence: [facility]  What city do you live in?: [place]  Home entry access options. Select all that apply.: Stairs  Number of steps to enter home.: 1  Do the steps have railings?: No  Type of Current Residence: 2 story home  Upon entering residence, is there a bedroom on the main floor (no further steps)?: Yes  Upon entering residence, is there a bathroom on the main floor (no further steps)?: Yes  Living Arrangements: Lives w/  Spouse/significant other  Is patient a ?: No    Activities of Daily Living Prior to Admission  Functional Status: Independent  Completes ADLs independently?: Yes  Ambulates independently?: Yes  Does patient use assisted devices?: Yes  Assisted Devices (DME) used: Straight Cane  Does patient currently own DME?: Yes  What DME does the patient currently own?: Walker, Straight Cane, Bedside Commode  Does patient have a history of Outpatient Therapy (PT/OT)?: No  Does the patient have a history of Short-Term Rehab?: Yes ([facility] and [facility])  Does patient have a history of HHC?: Yes ([facility])  Does patient currently have HHC?: No         Patient Information Continued  Does patient have prescription coverage?: Yes  Does patient receive dialysis treatments?: No  Does patient have a history of substance abuse?: No  Does patient have a history of Mental Health Diagnosis?: Yes (anxiety, depression)  Is patient receiving treatment for mental health?: Yes (medication management)  Has patient received inpatient treatment related to mental health in the last 2 years?: No         Means of Transportation  Means of Transport to Appts:: Family transport          DISCHARGE DETAILS:    Discharge planning discussed with:: patient        CM contacted family/caregiver?: No- see comments (pt declined)  Were Treatment Team discharge recommendations reviewed with patient/caregiver?: Yes  Did patient/caregiver verbalize understanding of patient care needs?: Yes  Were patient/caregiver advised of the risks associated with not following Treatment Team discharge recommendations?: Yes         Requested Home Health Care         Is the patient interested in HHC at discharge?: No    DME Referral Provided  Referral made for DME?: No              Treatment Team Recommendation: Home with Home Health Care  Discharge Destination Plan:: Home  Transport at Discharge : Family                 CM met with patient at the bedside, baseline  information was obtained. CM discussed the role of CM in helping the patient develop a discharge plan and assist the patient in carry out their plan.     Patient reports she lives at [address] [place] [address] with her spouse and 19 year old daughter. Patient has first floor set up. Patient typically ambulates with cane.     CM discussed with patient therapy recommendation for [facility] and patient declined.    CM to follow for any additional CM discharge referral needs.

## 2024-12-27 NOTE — PHYSICAL THERAPY NOTE
PHYSICAL THERAPY EVALUATION  NAME:  Bailey Olsen  DATE: 12/27/24    AGE:   54 y.o.  Mrn:   [id number]  ADMIT DX:  Hyperammonemia (HCC) [E72.20]  Influenza [J11.1]  Weakness generalized [R53.1]    Past Medical History:   Diagnosis Date    Back pain     Carcinoid tumor     neuroendocrine    CHF (congestive heart failure) (HCC)     Cirrhosis of liver (HCC)     Sarcoidosis      Length Of Stay: 1  Performed at least 2 patient identifiers during session: Name and Birthday  PHYSICAL THERAPY EVALUATION :    12/27/24 1036   PT Last Visit   PT Visit Date 12/27/24   Note Type   Note type Evaluation   Pain Assessment   Pain Assessment Tool 0-10   Pain Score 7   Pain Location/Orientation Orientation: Lower;Location: Back   Restrictions/Precautions   Other Precautions Contact/isolation;Droplet precautions;Chair Alarm;Bed Alarm;Fall Risk;Pain   Home Living   Type of Home House  (1 [address])   Home Layout Two level;Performs ADLs on one level;Able to live on main level with bedroom/bathroom   Bathroom Shower/Tub Tub/shower unit   Bathroom Toilet Raised   Bathroom Equipment Grab bars in shower;Grab bars around toilet   Home Equipment Walker;Other (Comment)  (rollator)   Additional Comments Reports living in a 2SH with 1 FABRICIO and 1st floor set up. Reports not using an AD in the home for mobility unless she feels she needs to use RW.   Prior Function   Level of [place] Independent with ADLs;Independent with functional mobility  (Intermittent assistance with socks)   Lives With Spouse;Daughter   Receives Help From Family   IADLs Independent with meal prep;Independent with medication management;Family/Friend/Other provides transportation   Falls in the last 6 months 5 to 10  (since July has about at least 5 falls with last admission with c/o fall 10/4/24.)   General   Additional Pertinent History Pt is s/p L1 kyphoplasty on 10/15/24. Reports no longer needs to wear TLSO brace but does at times with IADLs such as cleaning. Pt recently  "admitted end of November with hepatic encepahlopathy and altered mental status. pt with inc B L edema.   + Flu   Cognition   Orientation Level Oriented X4   Following Commands Follows one step commands with increased time or repetition   Comments repeated cues at times.   Subjective   Subjective \"I did get my hair done.\"   RLE Assessment   RLE Assessment WFL  (3+/5)   LLE Assessment   LLE Assessment WFL  (3+/5)   Bed Mobility   Supine to Sit 5  Supervision   Additional items Bedrails;HOB elevated   Additional Comments HOB slightly elevated. pt reports adjustable bed at home. inc time to complete.   Transfers   Sit to Stand 5  Supervision   Additional items Increased time required;Verbal cues   Stand to Sit 5  Supervision   Additional items Increased time required;Verbal cues   Stand pivot   (supervision to minAx1 with LOB and reaching for external support, min manual cues for balance)   Additional items Increased time required;Verbal cues   Additional Comments no AD. sit<>stand with supervision, wide DENICE. spt with supervision/minAx1 with manual cues to recover LOB and pt reaching for external support.   Ambulation/Elevation   Gait pattern Wide DENICE;Improper Weight shift;Decreased foot clearance;Short stride;Excessively slow;Step through pattern   Gait Assistance   (supervision to miNAx1)   Additional items Verbal cues;Assist x 1   Assistive Device None   Distance ambulated 14'x1 and 25'x1 without AD with stand by assistance to miNAx1 with slow cary, decreased step length, foot clearance, inc path deviation, LOB 2-3 times requiring miNAx1 to recover.   Balance   Static Sitting Good   Dynamic Sitting Fair +   Static Standing Fair   Dynamic Standing Fair -   Ambulatory Poor +   Activity Tolerance   Activity Tolerance Patient limited by fatigue   Medical Staff Made Aware Pat DAVALOS   Nurse Made Aware Shwetha WRAY   Assessment   Prognosis Good   Problem List Decreased strength;Decreased endurance;Impaired " "balance;Decreased mobility;Decreased safety awareness;Obesity;Pain   Barriers to Discharge Comments pt has support from family prn   Goals   Patient Goals \"Go home\"   STG Expiration Date 01/10/25   PT Treatment Day 1   Plan   Treatment/Interventions ADL retraining;Functional transfer training;LE strengthening/ROM;Elevations;Therapeutic exercise;Endurance training;Patient/family training;Equipment eval/education;Bed mobility;Gait training;Compensatory technique education;Spoke to nursing;Spoke to case management;OT   PT Frequency 2-3x/wk   Discharge Recommendation   Rehab Resource Intensity Level, PT III (Minimum Resource Intensity)  (HHPT)   Equipment Recommended   (walker-pt has)   Additional Comments recommend use of RW upon return to home with progression to spc   Additional Comments 2 pt provided spc for use at home prn.   AM-PAC Basic Mobility Inpatient   Turning in Flat Bed Without Bedrails 3   Lying on Back to Sitting on Edge of Flat Bed Without Bedrails 3   Moving Bed to Chair 3   Standing Up From Chair Using Arms 3   Walk in Room 3   Climb 3-5 Stairs With Railing 3   Basic Mobility Inpatient Raw Score 18   Basic Mobility Standardized Score 41.05   [facility] Highest Level Of Mobility   JH-HLM Goal 6: Walk 10 steps or more   JH-HLM Achieved 7: Walk 25 feet or more   Additional Treatment Session   Start Time 1050   End Time 1100   Treatment Assessment 36   Equipment Use initiated spc. sit<>stand with spc with supervision. spt with spc with supervision. ambulated 160'x1 with spc with close supervision with wide DENICE, slow cary, decreased step length and slight path deviation. discussed use of spc prn in home however currently given patient's feeling of weakness recommend use of RW and progress to spc with HHC. pt verbalized understanding. discussed HHC upon return to home. patient verbalize dunderstanding and in agreement.   End of Consult   Patient Position at End of Consult Bedside chair;Bed/Chair alarm " activated;All needs within reach       Pt requires PT/OT co-eval due to medical complexity, safety concerns, fall risk, significant assistance with mobility and/or cognitive-behavioral impairments.    (Please find full objective findings from PT assessment regarding body systems outlined above).     Assessment: Pt is a 54 y.o. female seen for PT evaluation s/p admission to [facility] on 12/26/2024 with Hepatic encephalopathy (HCC).  Order placed for PT services.  Upon evaluation: Pt is presenting with impaired functional mobility due to pain, decreased strength, decreased endurance, impaired balance, gait deviations, decreased safety awareness, fall risk, and LE edema requiring  supervision assistance for bed mobility, supervision to minimal assistance for transfers, and supervision to minimal assistance for ambulation with out AD . Pt's clinical presentation is currently unpredictable given the functional mobility deficits above, especially weakness, edema of extremities, decreased endurance, impaired balance, gait deviations, pain, decreased activity tolerance, decreased functional mobility tolerance, and decreased safety awareness, coupled with fall risks as indicated by AM-PAC 6-Clicks: 18/24 as well as hx of falls, impaired balance, polypharmacy, decreased safety awareness, and obesity and combined with medical complications of pain impacting overall mobility status, abnormal H&H, abnormal WBCs, abnormal potassium values, readmission to hospital, need for input for mobility technique/safety, and abnormal ammonia, Flu B, hepatic encephalopathy .  Pt's PMHx and comorbidities that may affect physical performance and progress include: obesity and cirrhosis of liver, pancytopenia, intractable back pain,hepatic encephalopathy, neuroendocrine carcinoid tumor, CHF, sarcoidosis  . Personal factors affecting pt at time of IE include: step(s) to enter environment, inability to perform IADLs, inability  to perform ADLs, inability to navigate level surfaces without external assistance, inability to ambulate household distances, and recent fall(s)/fall history. Pt will benefit from continued skilled PT services to address deficits as defined above and to maximize level of functional mobility to facilitate return toward PLOF and improved QOL. From PT/mobility standpoint, recommendation at time of d/c would be Level III (Minimum Resource Intensity), with family and/or caregiver support, and with walker in order to reduce fall risk and maximize pt's functional independence and consistency with mobility. Recommend trial with walker next 1-2 sessions and ther ex next 1-2 sessions.       The patient's AM-PAC Basic Mobility Inpatient Short Form Raw Score is 18. A Raw score of greater than 16 suggests the patient may benefit from discharge to home. Please also refer to the recommendation of the Physical Therapist for safe discharge planning.       Goals: Pt will: Perform bed mobility tasks with modified Independent to reposition in bed and prepare for transfers. Pt will perform transfers with modified Independent to decrease burden of care, decrease risk for falls, and improve activity tolerance and prepare for ambulation. Pt will ambulate with LRAD for >/= 250' with  modified Independent  to decrease burden of care, decrease risk for falls, improve activity tolerance, and improve gait quality and to access home environment. Pt will complete 1 step with LRAD and >/= 12 steps with unilateral handrail with modified Independent to return to home with FABRICIO, return to multilevel home, decrease risk for falls, and improve activity tolerance. Pt will participate in objective balance assessment to determine baseline fall risk. Pt will participate in SSWS assessment to determine level of mobility. Pt will increase B LE strength >/= 1/2 MMT grade to facilitate functional mobility.      Dianna Lawler, PT,DPT

## 2024-12-27 NOTE — PLAN OF CARE
Problem: PHYSICAL THERAPY ADULT  Goal: Performs mobility at highest level of function for planned discharge setting.  See evaluation for individualized goals.  Description: Treatment/Interventions: ADL retraining, Functional transfer training, LE strengthening/ROM, Elevations, Therapeutic exercise, Endurance training, Patient/family training, Equipment eval/education, Bed mobility, Gait training, Compensatory technique education, Spoke to nursing, Spoke to case management, OT  Equipment Recommended:  (walker-pt has)       See flowsheet documentation for full assessment, interventions and recommendations.  12/27/2024 1342 by Dianna Lawler PT  Note: Prognosis: Good  Problem List: Decreased strength, Decreased endurance, Impaired balance, Decreased mobility, Decreased safety awareness, Obesity, Pain  Pt tolerated session fairly well. She was able to ambulate increased distance with decreased assistance with spc compared to no AD. She requires close supervision with cues for sequencing and improved gait pattern wiht spc. She is limited by decreased strength, balance and endurance. She will continue to beenfit from PTs ervices to maximize LOF.        Barriers to Discharge Comments: pt has support from family prn  Rehab Resource Intensity Level, PT: III (Minimum Resource Intensity) (HHPT)    See flowsheet documentation for full assessment.

## 2024-12-27 NOTE — ASSESSMENT & PLAN NOTE
Chronic in nature, managed on percocet q6h outpatient  Should stop narcotics, as this can cause constipation and thus hyperammonemia   Was advised outpatient to see a spine and pain specialist   Supportive care

## 2024-12-27 NOTE — PROGRESS NOTES
Progress Note - Hospitalist   Name: Bailey Olsen 54 y.o. female I MRN: [id number]  Unit/Bed#: -01 I Date of Admission: 12/26/2024   Date of Service: 12/27/2024 I Hospital Day: 1    Assessment & Plan  Hepatic encephalopathy (HCC)  Poa with dry heaving at home and overall not feeling well  Reports compliance with lactulose TID and having around 2 BM daily with most recent being morning of admission  Of note, has been taking percocet q6h outpatient for chronic back pain  On admission, oriented to self, place, time, and situation. A little slow to respond   CT head normal   CT CAP with cirrhosis, portal HTN and small amount of ascites.  Ammonia 214 on admission, now down trending   Denies alcohol use   Did not respond to po lactulose in ED   Given lactulose retention enema x1 followed by po lactulose 30mg every 2 hours with good response  Decrease lactulose to 30mg po TID and monitor response with goal BM 4 daily   consult GI since patient still with elevated ammonia despite following outpatient regimen  Continue to trend daily ammonia levels   AAO x4   Hyperammonemia (HCC)  Ammonia 214 on admission   See plan under hepatic encephalopathy   Other cirrhosis of liver (HCC)  Followed by GI  Continue Aldactone and torsemide  Continue lactulose  LFT and T bili at baseline  Class 3 severe obesity due to excess calories in adult (HCC)  Educate on lifestyle changes   Influenza B  Tested positive on admission  Supportive care   Start tamiflu  Pancytopenia (HCC)  Chronically anemic, baseline Hgb 9-10  Chronic thrombocytopenia, baseline around 70  With DVT prophylaxis, hold if platelet below 50  Intractable back pain  Chronic in nature, managed on percocet q6h outpatient  Should stop narcotics, as this can cause constipation and thus hyperammonemia   Was advised outpatient to see a spine and pain specialist   Supportive care     VTE Pharmacologic Prophylaxis:   Moderate Risk (Score 3-4) - Pharmacological DVT Prophylaxis  Ordered: heparin.    Mobility:   Basic Mobility Inpatient Raw Score: 16  JH-HLM Goal: 5: Stand one or more mins  JH-HLM Achieved: 1: Laying in bed  JH-HLM Goal achieved. Continue to encourage appropriate mobility.    Patient Centered Rounds: I performed bedside rounds with nursing staff today.   Discussions with Specialists or Other Care Team Provider: nursing, cm    Education and Discussions with Family / Patient: Patient declined call to .     Current Length of Stay: 1 day(s)  Current Patient Status: Inpatient   Certification Statement: The patient will continue to require additional inpatient hospital stay due to requiring lactulose for hyperammonemia and monitoring mental status  Discharge Plan: Anticipate discharge in 24-48 hrs to home.    Code Status: Level 1 - Full Code    Subjective   Patient states that she is feeling well today, just having pain her back which she states is chronic since breaking her spine last month. Explained to patient that taking narcotics will cause constipation and increase of ammonia levels. Try other pain modalities.    Objective :  Temp:  [96.8 °F (36 °C)-97.5 °F (36.4 °C)] 96.8 °F (36 °C)  HR:  [] 92  BP: (135-148)/(72-82) 148/82  Resp:  [13-19] 18  SpO2:  [94 %-100 %] 100 %  O2 Device: None (Room air)    Body mass index is 46.37 kg/m².     Input and Output Summary (last 24 hours):     Intake/Output Summary (Last 24 hours) at 12/27/2024 1106  Last data filed at 12/27/2024 1056  Gross per 24 hour   Intake 1630 ml   Output 1000 ml   Net 630 ml       Physical Exam  Vitals reviewed.   Constitutional:       General: She is not in acute distress.     Appearance: Normal appearance. She is not ill-appearing.   HENT:      Head: Normocephalic and atraumatic.      Nose: Nose normal.      Mouth/Throat:      Mouth: Mucous membranes are moist.      Pharynx: Oropharynx is clear.   Eyes:      Extraocular Movements: Extraocular movements intact.      Conjunctiva/sclera:  Conjunctivae normal.   Cardiovascular:      Rate and Rhythm: Normal rate and regular rhythm.      Pulses: Normal pulses.      Heart sounds: Normal heart sounds. No murmur heard.  Pulmonary:      Effort: Pulmonary effort is normal. No respiratory distress.      Breath sounds: Normal breath sounds. No wheezing or rhonchi.   Abdominal:      General: Abdomen is flat. Bowel sounds are normal. There is no distension.      Palpations: Abdomen is soft.      Tenderness: There is no abdominal tenderness. There is no guarding.   Musculoskeletal:         General: Normal range of motion.      Cervical back: Normal range of motion.      Right lower leg: No edema.      Left lower leg: No edema.   Skin:     General: Skin is warm.      Comments: Chronic venous stasis of lower extremities    Neurological:      General: No focal deficit present.      Mental Status: She is alert and oriented to person, place, and time. Mental status is at baseline.      Motor: No weakness.   Psychiatric:         Mood and Affect: Mood normal.         Behavior: Behavior normal.         Thought Content: Thought content normal.         Judgment: Judgment normal.           Lines/Drains:        Lab Results: I have reviewed the following results:   Results from last 7 days   Lab Units 12/27/24  0542 12/26/24  0854   WBC Thousand/uL 2.32* 2.46*   HEMOGLOBIN g/dL 9.7* 10.5*   HEMATOCRIT % 29.7* 32.4*   PLATELETS Thousands/uL 60* 67*   SEGS PCT %  --  67   LYMPHO PCT %  --  23   MONO PCT %  --  7   EOS PCT %  --  2     Results from last 7 days   Lab Units 12/27/24  0542   SODIUM mmol/L 143   POTASSIUM mmol/L 3.0*   CHLORIDE mmol/L 108   CO2 mmol/L 31   BUN mg/dL 5   CREATININE mg/dL 0.64   ANION GAP mmol/L 4   CALCIUM mg/dL 8.0*   ALBUMIN g/dL 2.6*   TOTAL BILIRUBIN mg/dL 1.22*   ALK PHOS U/L 172*   ALT U/L 15   AST U/L 31   GLUCOSE RANDOM mg/dL 109     Results from last 7 days   Lab Units 12/26/24  0854   INR  1.31*     Results from last 7 days   Lab Units  12/26/24  0817   POC GLUCOSE mg/dl 136               Recent Cultures (last 7 days):           Last 24 Hours Medication List:     Current Facility-Administered Medications:     folic acid (FOLVITE) tablet 1 mg, Daily    gabapentin (NEURONTIN) capsule 200 mg, TID    heparin (porcine) subcutaneous injection 5,000 Units, Q8H MIGUEL    hydroxychloroquine (PLAQUENIL) tablet 200 mg, BID With Meals    lactulose (CHRONULAC) oral solution 30 g, TID    oseltamivir (TAMIFLU) capsule 75 mg, Q12H MIGUEL    pantoprazole (PROTONIX) EC tablet 40 mg, Early Morning    sertraline (ZOLOFT) tablet 100 mg, Daily    spironolactone (ALDACTONE) tablet 50 mg, Daily    torsemide (DEMADEX) tablet 20 mg, Daily    trimethobenzamide (TIGAN) IM injection 200 mg, Q6H PRN    Administrative Statements   Today, Patient Was Seen By: Silva Giron PA-C    **Please Note: This note may have been constructed using a voice recognition system.**

## 2024-12-27 NOTE — ASSESSMENT & PLAN NOTE
Poa with dry heaving at home and overall not feeling well  Reports compliance with lactulose TID and having around 2 BM daily with most recent being morning of admission  Of note, has been taking percocet q6h outpatient for chronic back pain  On admission, oriented to self, place, time, and situation. A little slow to respond   CT head normal   CT CAP with cirrhosis, portal HTN and small amount of ascites.  Ammonia 214 on admission, now down trending   Denies alcohol use   Did not respond to po lactulose in ED   Given lactulose retention enema x1 followed by po lactulose 30mg every 2 hours with good response  Decrease lactulose to 30mg po TID and monitor response with goal BM 4 daily   consult GI since patient still with elevated ammonia despite following outpatient regimen  Continue to trend daily ammonia levels   AAO x4

## 2024-12-27 NOTE — PLAN OF CARE
Problem: PAIN - ADULT  Goal: Verbalizes/displays adequate comfort level or baseline comfort level  Description: Interventions:  - Encourage patient to monitor pain and request assistance  - Assess pain using appropriate pain scale  - Administer analgesics based on type and severity of pain and evaluate response  - Implement non-pharmacological measures as appropriate and evaluate response  - Consider cultural and social influences on pain and pain management  - Notify physician/advanced practitioner if interventions unsuccessful or patient reports new pain  12/26/2024 2238 by Jesenia Elizondo RN  Outcome: Progressing  12/26/2024 2230 by Jesenia Elizondo RN  Outcome: Progressing

## 2024-12-28 VITALS
RESPIRATION RATE: 18 BRPM | BODY MASS INDEX: 46.37 KG/M2 | DIASTOLIC BLOOD PRESSURE: 69 MMHG | OXYGEN SATURATION: 99 % | WEIGHT: 278.66 LBS | SYSTOLIC BLOOD PRESSURE: 129 MMHG | TEMPERATURE: 97.2 F | HEART RATE: 89 BPM

## 2024-12-28 LAB
ALBUMIN SERPL BCG-MCNC: 2.8 G/DL (ref 3.5–5)
ALP SERPL-CCNC: 176 U/L (ref 34–104)
ALT SERPL W P-5'-P-CCNC: 13 U/L (ref 7–52)
AMMONIA PLAS-SCNC: 59 UMOL/L (ref 18–72)
ANION GAP SERPL CALCULATED.3IONS-SCNC: 6 MMOL/L (ref 4–13)
AST SERPL W P-5'-P-CCNC: 28 U/L (ref 13–39)
BILIRUB SERPL-MCNC: 1.53 MG/DL (ref 0.2–1)
BUN SERPL-MCNC: 6 MG/DL (ref 5–25)
CALCIUM ALBUM COR SERPL-MCNC: 8.9 MG/DL (ref 8.3–10.1)
CALCIUM SERPL-MCNC: 7.9 MG/DL (ref 8.4–10.2)
CHLORIDE SERPL-SCNC: 105 MMOL/L (ref 96–108)
CO2 SERPL-SCNC: 31 MMOL/L (ref 21–32)
CREAT SERPL-MCNC: 0.67 MG/DL (ref 0.6–1.3)
ERYTHROCYTE [DISTWIDTH] IN BLOOD BY AUTOMATED COUNT: 16.4 % (ref 11.6–15.1)
GFR SERPL CREATININE-BSD FRML MDRD: 99 ML/MIN/1.73SQ M
GLUCOSE SERPL-MCNC: 112 MG/DL (ref 65–140)
HCT VFR BLD AUTO: 32.6 % (ref 34.8–46.1)
HGB BLD-MCNC: 10.6 G/DL (ref 11.5–15.4)
MAGNESIUM SERPL-MCNC: 1.8 MG/DL (ref 1.9–2.7)
MCH RBC QN AUTO: 31.5 PG (ref 26.8–34.3)
MCHC RBC AUTO-ENTMCNC: 32.5 G/DL (ref 31.4–37.4)
MCV RBC AUTO: 97 FL (ref 82–98)
PLATELET # BLD AUTO: 60 THOUSANDS/UL (ref 149–390)
PMV BLD AUTO: 10 FL (ref 8.9–12.7)
POTASSIUM SERPL-SCNC: 3 MMOL/L (ref 3.5–5.3)
POTASSIUM SERPL-SCNC: 3.2 MMOL/L (ref 3.5–5.3)
PROT SERPL-MCNC: 6.4 G/DL (ref 6.4–8.4)
RBC # BLD AUTO: 3.37 MILLION/UL (ref 3.81–5.12)
SODIUM SERPL-SCNC: 142 MMOL/L (ref 135–147)
WBC # BLD AUTO: 2.59 THOUSAND/UL (ref 4.31–10.16)

## 2024-12-28 PROCEDURE — 83735 ASSAY OF MAGNESIUM: CPT

## 2024-12-28 PROCEDURE — 85027 COMPLETE CBC AUTOMATED: CPT

## 2024-12-28 PROCEDURE — 82140 ASSAY OF AMMONIA: CPT

## 2024-12-28 PROCEDURE — 80053 COMPREHEN METABOLIC PANEL: CPT

## 2024-12-28 PROCEDURE — 84132 ASSAY OF SERUM POTASSIUM: CPT

## 2024-12-28 PROCEDURE — 99239 HOSP IP/OBS DSCHRG MGMT >30: CPT

## 2024-12-28 RX ORDER — MAGNESIUM SULFATE HEPTAHYDRATE 40 MG/ML
2 INJECTION, SOLUTION INTRAVENOUS ONCE
Status: COMPLETED | OUTPATIENT
Start: 2024-12-28 | End: 2024-12-28

## 2024-12-28 RX ORDER — LACTULOSE 10 G/15ML
30 SOLUTION ORAL 3 TIMES DAILY
Qty: 240 ML | Refills: 0 | Status: SHIPPED | OUTPATIENT
Start: 2024-12-28

## 2024-12-28 RX ORDER — POTASSIUM CHLORIDE 1500 MG/1
40 TABLET, EXTENDED RELEASE ORAL ONCE
Status: COMPLETED | OUTPATIENT
Start: 2024-12-28 | End: 2024-12-28

## 2024-12-28 RX ADMIN — LIDOCAINE 1 PATCH: 700 PATCH TOPICAL at 08:14

## 2024-12-28 RX ADMIN — HYDROXYCHLOROQUINE SULFATE 200 MG: 200 TABLET, FILM COATED ORAL at 08:14

## 2024-12-28 RX ADMIN — OSELTAMAVIR PHOSPHATE 75 MG: 75 CAPSULE ORAL at 08:15

## 2024-12-28 RX ADMIN — POTASSIUM CHLORIDE 40 MEQ: 1500 TABLET, EXTENDED RELEASE ORAL at 08:15

## 2024-12-28 RX ADMIN — PANTOPRAZOLE SODIUM 40 MG: 40 TABLET, DELAYED RELEASE ORAL at 04:46

## 2024-12-28 RX ADMIN — LACTULOSE 30 G: 20 SOLUTION ORAL at 08:15

## 2024-12-28 RX ADMIN — POTASSIUM CHLORIDE 40 MEQ: 1500 TABLET, EXTENDED RELEASE ORAL at 12:11

## 2024-12-28 RX ADMIN — TORSEMIDE 20 MG: 20 TABLET ORAL at 08:15

## 2024-12-28 RX ADMIN — SERTRALINE 100 MG: 100 TABLET, FILM COATED ORAL at 08:15

## 2024-12-28 RX ADMIN — FOLIC ACID 1 MG: 1 TABLET ORAL at 08:15

## 2024-12-28 RX ADMIN — MAGNESIUM SULFATE HEPTAHYDRATE 2 G: 40 INJECTION, SOLUTION INTRAVENOUS at 08:14

## 2024-12-28 RX ADMIN — RIFAXIMIN 550 MG: 550 TABLET ORAL at 08:14

## 2024-12-28 RX ADMIN — SPIRONOLACTONE 50 MG: 25 TABLET ORAL at 08:15

## 2024-12-28 RX ADMIN — GABAPENTIN 200 MG: 100 CAPSULE ORAL at 08:15

## 2024-12-28 RX ADMIN — HEPARIN SODIUM 5000 UNITS: 5000 INJECTION, SOLUTION INTRAVENOUS; SUBCUTANEOUS at 04:46

## 2024-12-28 NOTE — ASSESSMENT & PLAN NOTE
Poa with dry heaving at home and overall not feeling well  Reports compliance with lactulose TID and having around 2 BM daily with most recent being morning of admission  Of note, has been taking percocet q6h outpatient for chronic back pain  On admission, oriented to self, place, time, and situation. A little slow to respond   CT head normal   CT CAP with cirrhosis, portal HTN and small amount of ascites.  Ammonia 214 on admission, now down trending   Denies alcohol use   Did not respond to po lactulose in ED   Given lactulose retention enema x1 followed by po lactulose 30mg every 2 hours with good response  Decrease lactulose to 30mg po TID with goal BM 4-5 daily   Educated patient on adjusting lactulose dose at home to hit goal BM daily   consult GI - continue lactulose and outpatient regimen. Close follow up with GI and pain specialist outpatient, as patient should stop taking percocet as this can lead to constipation  AAO x4

## 2024-12-28 NOTE — DISCHARGE INSTR - AVS FIRST PAGE
Dear Bailey Olsen,     It was our pleasure to care for you here at [facility]. For follow up as well as any medication refills, we recommend that you follow up with your primary care physician. Here are the most important instructions/ recommendations at discharge:     Notable Medication Adjustments -   Start lactulose 30g three times daily with goal bowel movements 4-5x daily   Stop percocet  Testing Required after Discharge - ** Please contact your PCP to request testing orders for any of the testing recommended here **  BMP in 5 days to check potassium  Important follow up information -   Follow up with pcp  Follow up with GI  Follow up with spine and pain specialist   Other Instructions -   Goal bowel movements is 4-5 times daily. If you are having more or less than this, you can adjust lactulose dose by taking more or less as needed.  If symptoms worsen or new symptoms arise, come back to the hospital   Please review this entire after visit summary as additional general instructions including medication list, appointments, activity, diet, any pertinent wound care, and other additional recommendations from your care team that may be provided for you.      Sincerely,     Silva Giron PA-C

## 2024-12-28 NOTE — INCIDENTAL FINDINGS
The following findings require follow up:  Radiographic finding   Finding: CT chest abdomen pelvis w contrast: No bowel obstruction, Cirrhosis with portal hypertension with a small amount of ascites., Fluid-filled small bowel loops, nonspecific, Area of groundglass density left upper lung, stable, follow-up CT chest in 6 months for stability of the left upper lobe area of groundglass density, No acute consolidation    Follow up required: PCP   Follow up should be done within 6 month(s)    Please notify the following clinician to assist with the follow up:   Dr. Brown    Incidental finding results were discussed with the Patient by Silva Giron PA-C on 12/28/24.   They expressed understanding and all questions answered.

## 2024-12-28 NOTE — DISCHARGE SUMMARY
Discharge Summary - Hospitalist   Name: Bailey Olsen 54 y.o. female I MRN: [id number]  Unit/Bed#: -01 I Date of Admission: 12/26/2024   Date of Service: 12/28/2024 I Hospital Day: 2     Assessment & Plan  Hepatic encephalopathy (HCC)  Poa with dry heaving at home and overall not feeling well  Reports compliance with lactulose TID and having around 2 BM daily with most recent being morning of admission  Of note, has been taking percocet q6h outpatient for chronic back pain  On admission, oriented to self, place, time, and situation. A little slow to respond   CT head normal   CT CAP with cirrhosis, portal HTN and small amount of ascites.  Ammonia 214 on admission, now down trending   Denies alcohol use   Did not respond to po lactulose in ED   Given lactulose retention enema x1 followed by po lactulose 30mg every 2 hours with good response  Decrease lactulose to 30mg po TID with goal BM 4-5 daily   Educated patient on adjusting lactulose dose at home to hit goal BM daily   consult GI - continue lactulose and outpatient regimen. Close follow up with GI and pain specialist outpatient, as patient should stop taking percocet as this can lead to constipation  AAO x4   Hyperammonemia (HCC)  Ammonia 214 on admission   See plan under hepatic encephalopathy   Other cirrhosis of liver (HCC)  Followed by GI  Continue Aldactone and torsemide  Continue lactulose  LFT and T bili at baseline  Class 3 severe obesity due to excess calories in adult (HCC)  Educate on lifestyle changes   Influenza B  Tested positive on admission  Supportive care   Start tamiflu  Pancytopenia (HCC)  Chronically anemic, baseline Hgb 9-10  Chronic thrombocytopenia, baseline around 70  Intractable back pain  Chronic in nature, managed on percocet q6h outpatient  Should stop narcotics, as this can cause constipation and thus hyperammonemia   Was advised outpatient to see a spine and pain specialist   Supportive care      Medical Problems        Resolved Problems  Date Reviewed: 10/6/2024   None         MESSAGE TO PCP (Nayely Brown DO) FOR FOLLOW UP:   Thank you for allowing us to participate in the care of your patient, Bailey Olsen, who was hospitalized from 12/26/2024 through 12/28/24 with the admitting diagnosis of hepatic encephalopathy.  Patient found to have hepatic encephalopathy with ammonia 214. She was treaded with lactulose retention enema with good response. Patient lactulose regimen adjusted to be lactulose 30mg tid and with goal BM 4-5 daily. Patient did well on this regimen while in the hospital and ammonia level normalized. Patient oriented throughout hospital stay. Patient should follow up with GI outpatient and avoid narcotics, as these can worsen hepatic encephalopathy and cause constipation. Patient also found to have the flu and was treated with supportive care.     Medication Changes:  Lactulose 30mg three times daily  Outpatient testing recommended:  None   If you have any additional questions or would like to discuss further, please feel free to contact me.  Silva Giron PA-C  [facility] Internal Medicine, Hospitalist, [phone]     Admission Date:   Admission Orders (From admission, onward)       Ordered        12/26/24 1221  INPATIENT ADMISSION  Once                          Discharge Date: 12/28/24    Consultations During Hospital Stay:  GI    Procedures Performed:   None     Significant Findings / Test Results:   Cxr with mild pulmonary vascular congestion  Ct head with no acute intracranial abnormality  Ct chest abdomen pelvis with no bowel obstruction. Cirrhosis with portal hypertension with a small amount of ascites. Fluid filled small bowel loops, nonspecific. Area of ground glass density left upper lung, stable, follow up ct chest in 6 months for stability. No acute consolidation    Incidental Findings:   none    Test Results Pending at Discharge (will require follow up):   None      Complications:  none      Reason for Admission: hepatic encephalopathy    Hospital Course:   Bailey Olsen is a 54 y.o. female patient who originally presented to the hospital on 12/26/2024 due to altered mental status. Patient found to have hepatic encephalopathy with ammonia 214. She was treaded with lactulose retention enema with good response. Patient lactulose regimen adjusted to be lactulose 30mg tid and with goal BM 4-5 daily. Patient did well on this regimen while in the hospital and ammonia level normalized. Patient oriented throughout hospital stay. Patient should follow up with GI outpatient and avoid narcotics, as these can worsen hepatic encephalopathy and cause constipation. Patient also found to have the flu and was treated with supportive care.     Please see above list of diagnoses and related plan for additional information.     Condition at Discharge: good    Discharge Day Visit / Exam:   Subjective:  patient states that she is feeling well today and feeling clear minded. States that she had 2-3 BM today already. Denies chest pain, shortness of breath or abdominal pain. Agreeable to discharge plan.  Vitals: Blood Pressure: 129/69 (12/28/24 0621)  Pulse: 89 (12/28/24 0621)  Temperature: (!) 97.2 °F (36.2 °C) (12/28/24 0621)  Temp Source: Temporal (12/26/24 1601)  Respirations: 18 (12/27/24 0725)  Weight - Scale: 126 kg (278 lb 10.6 oz) (12/26/24 0809)  SpO2: 94 % (12/28/24 0621)  Physical Exam  Vitals reviewed.   Constitutional:       General: She is not in acute distress.     Appearance: Normal appearance. She is not ill-appearing.   HENT:      Head: Normocephalic and atraumatic.      Nose: Nose normal.      Mouth/Throat:      Mouth: Mucous membranes are moist.      Pharynx: Oropharynx is clear.   Eyes:      Extraocular Movements: Extraocular movements intact.      Conjunctiva/sclera: Conjunctivae normal.   Cardiovascular:      Rate and Rhythm: Normal rate and regular rhythm.      Pulses: Normal pulses.      Heart  sounds: Normal heart sounds. No murmur heard.  Pulmonary:      Effort: Pulmonary effort is normal. No respiratory distress.      Breath sounds: Normal breath sounds. No wheezing or rhonchi.   Abdominal:      General: Abdomen is flat. Bowel sounds are normal. There is no distension.      Palpations: Abdomen is soft.      Tenderness: There is no abdominal tenderness. There is no guarding.   Musculoskeletal:         General: Normal range of motion.      Cervical back: Normal range of motion.      Right lower leg: No edema.      Left lower leg: No edema.   Skin:     General: Skin is warm.      Comments: Chronic venous stasis of lower extremities    Neurological:      General: No focal deficit present.      Mental Status: She is alert and oriented to person, place, and time. Mental status is at baseline.      Motor: No weakness.   Psychiatric:         Mood and Affect: Mood normal.         Behavior: Behavior normal.         Thought Content: Thought content normal.         Judgment: Judgment normal.         Discussion with Family: Patient declined call to .     Discharge instructions/Information to patient and family:   See after visit summary for information provided to patient and family.      Provisions for Follow-Up Care:  See after visit summary for information related to follow-up care and any pertinent home health orders.      Mobility at time of Discharge:   Basic Mobility Inpatient Raw Score: 18  JH-HLM Goal: 6: Walk 10 steps or more  JH-HLM Achieved: 7: Walk 25 feet or more  HLM Goal achieved. Continue to encourage appropriate mobility.     Disposition:   Home    Planned Readmission: none    Discharge Medications:  See after visit summary for reconciled discharge medications provided to patient and/or family.      Administrative Statements   Discharge Statement:  I have spent a total time of 45 minutes in caring for this patient on the day of the visit/encounter. .    **Please Note: This note may  have been constructed using a voice recognition system**

## 2024-12-28 NOTE — PLAN OF CARE
Problem: PAIN - ADULT  Goal: Verbalizes/displays adequate comfort level or baseline comfort level  Description: Interventions:  - Encourage patient to monitor pain and request assistance  - Assess pain using appropriate pain scale  - Administer analgesics based on type and severity of pain and evaluate response  - Implement non-pharmacological measures as appropriate and evaluate response  - Consider cultural and social influences on pain and pain management  - Notify physician/advanced practitioner if interventions unsuccessful or patient reports new pain  Outcome: Progressing     Problem: INFECTION - ADULT  Goal: Absence or prevention of progression during hospitalization  Description: INTERVENTIONS:  - Assess and monitor for signs and symptoms of infection  - Monitor lab/diagnostic results  - Monitor all insertion sites, i.e. indwelling lines, tubes, and drains  - Monitor endotracheal if appropriate and nasal secretions for changes in amount and color  - [place] appropriate cooling/warming therapies per order  - Administer medications as ordered  - Instruct and encourage patient and family to use good hand hygiene technique  - Identify and instruct in appropriate isolation precautions for identified infection/condition  Outcome: Progressing  Goal: Absence of fever/infection during neutropenic period  Description: INTERVENTIONS:  - Monitor WBC    Outcome: Progressing     Problem: SAFETY ADULT  Goal: Patient will remain free of falls  Description: INTERVENTIONS:  - Educate patient/family on patient safety including physical limitations  - Instruct patient to call for assistance with activity   - Consult OT/PT to assist with strengthening/mobility   - Keep Call bell within reach  - Keep bed low and locked with side rails adjusted as appropriate  - Keep care items and personal belongings within reach  - Initiate and maintain comfort rounds  - Make Fall Risk Sign visible to staff  - Offer Toileting every 2 Hours,  in advance of need  - Initiate/Maintain bed/chair alarm  - Apply yellow socks and bracelet for high fall risk patients  - Consider moving patient to room near nurses station  Outcome: Progressing  Goal: Maintain or return to baseline ADL function  Description: INTERVENTIONS:  -  Assess patient's ability to carry out ADLs; assess patient's baseline for ADL function and identify physical deficits which impact ability to perform ADLs (bathing, care of mouth/teeth, toileting, grooming, dressing, etc.)  - Assess/evaluate cause of self-care deficits   - Assess range of motion  - Assess patient's mobility; develop plan if impaired  - Assess patient's need for assistive devices and provide as appropriate  - Encourage maximum independence but intervene and supervise when necessary  - Involve family in performance of ADLs  - Assess for home care needs following discharge   - Consider OT consult to assist with ADL evaluation and planning for discharge  - Provide patient education as appropriate  Outcome: Progressing  Goal: Maintains/Returns to pre admission functional level  Description: INTERVENTIONS:  - Perform AM-PAC 6 Click Basic Mobility/ Daily Activity assessment daily.  - Set and communicate daily mobility goal to care team and patient/family/caregiver.   - Collaborate with rehabilitation services on mobility goals if consulted  - Ambulate patient 6 times a day  - Out of bed to chair 3 times a day   - Out of bed for toileting  - Record patient progress and toleration of activity level   Outcome: Progressing     Problem: DISCHARGE PLANNING  Goal: Discharge to home or other facility with appropriate resources  Description: INTERVENTIONS:  - Identify barriers to discharge w/patient and caregiver  - Arrange for needed discharge resources and transportation as appropriate  - Identify discharge learning needs (meds, wound care, etc.)  - Arrange for interpretive services to assist at discharge as needed  - Refer to Case  Management Department for coordinating discharge planning if the patient needs post-hospital services based on physician/advanced practitioner order or complex needs related to functional status, cognitive ability, or social support system  Outcome: Progressing     Problem: Knowledge Deficit  Goal: Patient/family/caregiver demonstrates understanding of disease process, treatment plan, medications, and discharge instructions  Description: Complete learning assessment and assess knowledge base.  Interventions:  - Provide teaching at level of understanding  - Provide teaching via preferred learning methods  Outcome: Progressing     Problem: Prexisting or High Potential for Compromised Skin Integrity  Goal: Skin integrity is maintained or improved  Description: INTERVENTIONS:  - Identify patients at risk for skin breakdown  - Assess and monitor skin integrity  - Assess and monitor nutrition and hydration status  - Monitor labs   - Assess for incontinence   - Turn and reposition patient  - Assist with mobility/ambulation  - Relieve pressure over bony prominences  - Avoid friction and shearing  - Provide appropriate hygiene as needed including keeping skin clean and dry  - Evaluate need for skin moisturizer/barrier cream  - Collaborate with interdisciplinary team   - Patient/family teaching  - Consider wound care consult   Outcome: Progressing     Problem: METABOLIC, FLUID AND ELECTROLYTES - ADULT  Goal: Electrolytes maintained within normal limits  Description: INTERVENTIONS:  - Monitor labs and assess patient for signs and symptoms of electrolyte imbalances  - Administer electrolyte replacement as ordered  - Monitor response to electrolyte replacements, including repeat lab results as appropriate  - Instruct patient on fluid and nutrition as appropriate  Outcome: Progressing

## 2025-01-08 NOTE — UTILIZATION REVIEW
NOTIFICATION OF ADMISSION DISCHARGE   This is a Notification of Discharge from [facility]. Please be advised that this patient has been discharge from our facility. Below you will find the admission and discharge date and time including the patient’s disposition.   UTILIZATION REVIEW CONTACT:  Елена Self  Utilization   Network Utilization Review Department  Phone: [phone] x carefully listen to the prompts. All voicemails are confidential.  Email: [email or web address]     ADMISSION INFORMATION  PRESENTATION DATE: 12/26/2024  8:05 AM  OBERVATION ADMISSION DATE: N/A  INPATIENT ADMISSION DATE: 12/26/24 12:21 PM   DISCHARGE DATE: 12/28/2024  1:58 PM   DISPOSITION:Home/Self Care    Network Utilization Review Department  ATTENTION: Please call with any questions or concerns to [phone] and carefully listen to the prompts so that you are directed to the right person. All voicemails are confidential.   For Discharge needs, contact Care Management DC Support Team at [phone] opt. 2  Send all requests for admission clinical reviews, approved or denied determinations and any other requests to dedicated fax number below belonging to the campus where the patient is receiving treatment. List of dedicated fax numbers for the Facilities:  FACILITY NAME UR FAX NUMBER   ADMISSION DENIALS (Administrative/Medical Necessity) [phone]   DISCHARGE SUPPORT TEAM ([facility]) [phone]   PARENT CHILD HEALTH (Maternity/NICU/Pediatrics) [phone]   [facility] [phone]   [facility] [phone]   [facility] [phone]   [facility] [phone]   [facility] [phone]   [facility] [phone]   [facility] [phone]   [facility]  [phone]   [facility] [phone]   [facility] [phone]   [facility] [phone]   [facility] [phone]

## 2025-01-16 ENCOUNTER — APPOINTMENT (OUTPATIENT)
Dept: LAB | Facility: HOSPITAL | Age: 55
End: 2025-01-16
Payer: COMMERCIAL

## 2025-01-16 ENCOUNTER — HOSPITAL ENCOUNTER (OUTPATIENT)
Dept: RADIOLOGY | Facility: HOSPITAL | Age: 55
End: 2025-01-16
Payer: COMMERCIAL

## 2025-01-16 DIAGNOSIS — R06.02 SOB (SHORTNESS OF BREATH): ICD-10-CM

## 2025-01-16 DIAGNOSIS — R06.02 SHORTNESS OF BREATH: ICD-10-CM

## 2025-01-16 DIAGNOSIS — I10 ESSENTIAL (PRIMARY) HYPERTENSION: ICD-10-CM

## 2025-01-16 DIAGNOSIS — I10 HTN, GOAL BELOW 130/80: ICD-10-CM

## 2025-01-16 LAB
ANION GAP SERPL CALCULATED.3IONS-SCNC: 2 MMOL/L (ref 4–13)
BNP SERPL-MCNC: 8 PG/ML (ref 0–100)
BUN SERPL-MCNC: 10 MG/DL (ref 5–25)
CALCIUM SERPL-MCNC: 8.6 MG/DL (ref 8.4–10.2)
CHLORIDE SERPL-SCNC: 102 MMOL/L (ref 96–108)
CO2 SERPL-SCNC: 37 MMOL/L (ref 21–32)
CREAT SERPL-MCNC: 0.88 MG/DL (ref 0.6–1.3)
ERYTHROCYTE [DISTWIDTH] IN BLOOD BY AUTOMATED COUNT: 15.1 % (ref 11.6–15.1)
GFR SERPL CREATININE-BSD FRML MDRD: 74 ML/MIN/1.73SQ M
GLUCOSE SERPL-MCNC: 107 MG/DL (ref 65–140)
HCT VFR BLD AUTO: 31.7 % (ref 34.8–46.1)
HGB BLD-MCNC: 10.2 G/DL (ref 11.5–15.4)
MCH RBC QN AUTO: 30.8 PG (ref 26.8–34.3)
MCHC RBC AUTO-ENTMCNC: 32.2 G/DL (ref 31.4–37.4)
MCV RBC AUTO: 96 FL (ref 82–98)
PLATELET # BLD AUTO: 76 THOUSANDS/UL (ref 149–390)
PMV BLD AUTO: 9.8 FL (ref 8.9–12.7)
POTASSIUM SERPL-SCNC: 3.4 MMOL/L (ref 3.5–5.3)
RBC # BLD AUTO: 3.31 MILLION/UL (ref 3.81–5.12)
SODIUM SERPL-SCNC: 141 MMOL/L (ref 135–147)
WBC # BLD AUTO: 3.36 THOUSAND/UL (ref 4.31–10.16)

## 2025-01-16 PROCEDURE — 80048 BASIC METABOLIC PNL TOTAL CA: CPT

## 2025-01-16 PROCEDURE — 85027 COMPLETE CBC AUTOMATED: CPT

## 2025-01-16 PROCEDURE — 71046 X-RAY EXAM CHEST 2 VIEWS: CPT

## 2025-01-16 PROCEDURE — 83880 ASSAY OF NATRIURETIC PEPTIDE: CPT

## 2025-01-16 PROCEDURE — 36415 COLL VENOUS BLD VENIPUNCTURE: CPT

## 2025-01-17 ENCOUNTER — HOSPITAL ENCOUNTER (INPATIENT)
Facility: HOSPITAL | Age: 55
LOS: 1 days | DRG: 193 | End: 2025-01-18
Attending: EMERGENCY MEDICINE | Admitting: INTERNAL MEDICINE
Payer: MEDICARE

## 2025-01-17 DIAGNOSIS — J11.1 INFLUENZA: ICD-10-CM

## 2025-01-17 DIAGNOSIS — R41.82 ALTERED MENTAL STATUS: Primary | ICD-10-CM

## 2025-01-17 DIAGNOSIS — K76.82 HEPATIC ENCEPHALOPATHY (HCC): ICD-10-CM

## 2025-01-17 PROBLEM — G93.41 ACUTE METABOLIC ENCEPHALOPATHY: Status: ACTIVE | Noted: 2025-01-17

## 2025-01-17 PROBLEM — F41.9 ANXIETY: Status: RESOLVED | Noted: 2024-05-26 | Resolved: 2025-01-17

## 2025-01-17 PROBLEM — R65.10 SIRS (SYSTEMIC INFLAMMATORY RESPONSE SYNDROME) (HCC): Status: ACTIVE | Noted: 2025-01-17

## 2025-01-17 LAB
ALBUMIN SERPL BCG-MCNC: 3.3 G/DL (ref 3.5–5)
ALP SERPL-CCNC: 196 U/L (ref 34–104)
ALT SERPL W P-5'-P-CCNC: 16 U/L (ref 7–52)
AMMONIA PLAS-SCNC: 183 UMOL/L (ref 18–72)
AMPHETAMINES SERPL QL SCN: NEGATIVE
ANION GAP SERPL CALCULATED.3IONS-SCNC: 5 MMOL/L (ref 4–13)
APTT PPP: 35 SECONDS (ref 23–34)
AST SERPL W P-5'-P-CCNC: 33 U/L (ref 13–39)
BACTERIA UR QL AUTO: NORMAL /HPF
BARBITURATES UR QL: NEGATIVE
BASOPHILS # BLD AUTO: 0.05 THOUSANDS/ΜL (ref 0–0.1)
BASOPHILS NFR BLD AUTO: 2 % (ref 0–1)
BENZODIAZ UR QL: NEGATIVE
BILIRUB SERPL-MCNC: 1.47 MG/DL (ref 0.2–1)
BILIRUB UR QL STRIP: NEGATIVE
BUN SERPL-MCNC: 13 MG/DL (ref 5–25)
CALCIUM ALBUM COR SERPL-MCNC: 9.2 MG/DL (ref 8.3–10.1)
CALCIUM SERPL-MCNC: 8.6 MG/DL (ref 8.4–10.2)
CHLORIDE SERPL-SCNC: 100 MMOL/L (ref 96–108)
CLARITY UR: CLEAR
CO2 SERPL-SCNC: 33 MMOL/L (ref 21–32)
COCAINE UR QL: NEGATIVE
COLOR UR: YELLOW
CREAT SERPL-MCNC: 0.86 MG/DL (ref 0.6–1.3)
EOSINOPHIL # BLD AUTO: 0.05 THOUSAND/ΜL (ref 0–0.61)
EOSINOPHIL NFR BLD AUTO: 2 % (ref 0–6)
ERYTHROCYTE [DISTWIDTH] IN BLOOD BY AUTOMATED COUNT: 15.4 % (ref 11.6–15.1)
ETHANOL SERPL-MCNC: <10 MG/DL
FENTANYL UR QL SCN: NEGATIVE
FLUAV AG UPPER RESP QL IA.RAPID: NEGATIVE
FLUBV AG UPPER RESP QL IA.RAPID: POSITIVE
GFR SERPL CREATININE-BSD FRML MDRD: 76 ML/MIN/1.73SQ M
GLUCOSE SERPL-MCNC: 102 MG/DL (ref 65–140)
GLUCOSE SERPL-MCNC: 141 MG/DL (ref 65–140)
GLUCOSE UR STRIP-MCNC: NEGATIVE MG/DL
HCT VFR BLD AUTO: 32.7 % (ref 34.8–46.1)
HGB BLD-MCNC: 10.4 G/DL (ref 11.5–15.4)
HGB UR QL STRIP.AUTO: ABNORMAL
HYDROCODONE UR QL SCN: NEGATIVE
IMM GRANULOCYTES # BLD AUTO: 0.01 THOUSAND/UL (ref 0–0.2)
IMM GRANULOCYTES NFR BLD AUTO: 0 % (ref 0–2)
INR PPP: 1.41 (ref 0.85–1.19)
KETONES UR STRIP-MCNC: NEGATIVE MG/DL
LACTATE SERPL-SCNC: 2.2 MMOL/L (ref 0.5–2)
LACTATE SERPL-SCNC: 2.5 MMOL/L (ref 0.5–2)
LEUKOCYTE ESTERASE UR QL STRIP: NEGATIVE
LIPASE SERPL-CCNC: 7 U/L (ref 11–82)
LYMPHOCYTES # BLD AUTO: 0.7 THOUSANDS/ΜL (ref 0.6–4.47)
LYMPHOCYTES NFR BLD AUTO: 21 % (ref 14–44)
MCH RBC QN AUTO: 30.7 PG (ref 26.8–34.3)
MCHC RBC AUTO-ENTMCNC: 31.8 G/DL (ref 31.4–37.4)
MCV RBC AUTO: 97 FL (ref 82–98)
METHADONE UR QL: NEGATIVE
MONOCYTES # BLD AUTO: 0.21 THOUSAND/ΜL (ref 0.17–1.22)
MONOCYTES NFR BLD AUTO: 6 % (ref 4–12)
NEUTROPHILS # BLD AUTO: 2.25 THOUSANDS/ΜL (ref 1.85–7.62)
NEUTS SEG NFR BLD AUTO: 69 % (ref 43–75)
NITRITE UR QL STRIP: NEGATIVE
NON-SQ EPI CELLS URNS QL MICRO: NORMAL /HPF
NRBC BLD AUTO-RTO: 0 /100 WBCS
OPIATES UR QL SCN: POSITIVE
OXYCODONE+OXYMORPHONE UR QL SCN: POSITIVE
PCP UR QL: NEGATIVE
PH UR STRIP.AUTO: 7 [PH]
PLATELET # BLD AUTO: 70 THOUSANDS/UL (ref 149–390)
PMV BLD AUTO: 10.1 FL (ref 8.9–12.7)
POTASSIUM SERPL-SCNC: 3.3 MMOL/L (ref 3.5–5.3)
PROT SERPL-MCNC: 7.6 G/DL (ref 6.4–8.4)
PROT UR STRIP-MCNC: NEGATIVE MG/DL
PROTHROMBIN TIME: 17.6 SECONDS (ref 12.3–15)
RBC # BLD AUTO: 3.39 MILLION/UL (ref 3.81–5.12)
RBC #/AREA URNS AUTO: NORMAL /HPF
SARS-COV+SARS-COV-2 AG RESP QL IA.RAPID: NEGATIVE
SODIUM SERPL-SCNC: 138 MMOL/L (ref 135–147)
SP GR UR STRIP.AUTO: <=1.005 (ref 1–1.03)
THC UR QL: NEGATIVE
UROBILINOGEN UR QL STRIP.AUTO: 1 E.U./DL
WBC # BLD AUTO: 3.27 THOUSAND/UL (ref 4.31–10.16)
WBC #/AREA URNS AUTO: NORMAL /HPF

## 2025-01-17 PROCEDURE — 82948 REAGENT STRIP/BLOOD GLUCOSE: CPT

## 2025-01-17 PROCEDURE — 99223 1ST HOSP IP/OBS HIGH 75: CPT | Performed by: INTERNAL MEDICINE

## 2025-01-17 PROCEDURE — 85610 PROTHROMBIN TIME: CPT | Performed by: PHYSICIAN ASSISTANT

## 2025-01-17 PROCEDURE — 82140 ASSAY OF AMMONIA: CPT | Performed by: PHYSICIAN ASSISTANT

## 2025-01-17 PROCEDURE — 81001 URINALYSIS AUTO W/SCOPE: CPT | Performed by: PHYSICIAN ASSISTANT

## 2025-01-17 PROCEDURE — 85730 THROMBOPLASTIN TIME PARTIAL: CPT | Performed by: PHYSICIAN ASSISTANT

## 2025-01-17 PROCEDURE — 85025 COMPLETE CBC W/AUTO DIFF WBC: CPT | Performed by: PHYSICIAN ASSISTANT

## 2025-01-17 PROCEDURE — 99285 EMERGENCY DEPT VISIT HI MDM: CPT

## 2025-01-17 PROCEDURE — 87811 SARS-COV-2 COVID19 W/OPTIC: CPT | Performed by: PHYSICIAN ASSISTANT

## 2025-01-17 PROCEDURE — 83605 ASSAY OF LACTIC ACID: CPT | Performed by: PHYSICIAN ASSISTANT

## 2025-01-17 PROCEDURE — 36415 COLL VENOUS BLD VENIPUNCTURE: CPT | Performed by: PHYSICIAN ASSISTANT

## 2025-01-17 PROCEDURE — 99285 EMERGENCY DEPT VISIT HI MDM: CPT | Performed by: PHYSICIAN ASSISTANT

## 2025-01-17 PROCEDURE — 87804 INFLUENZA ASSAY W/OPTIC: CPT | Performed by: PHYSICIAN ASSISTANT

## 2025-01-17 PROCEDURE — 83690 ASSAY OF LIPASE: CPT | Performed by: PHYSICIAN ASSISTANT

## 2025-01-17 PROCEDURE — 82077 ASSAY SPEC XCP UR&BREATH IA: CPT | Performed by: PHYSICIAN ASSISTANT

## 2025-01-17 PROCEDURE — 80053 COMPREHEN METABOLIC PANEL: CPT | Performed by: PHYSICIAN ASSISTANT

## 2025-01-17 PROCEDURE — 80307 DRUG TEST PRSMV CHEM ANLYZR: CPT | Performed by: PHYSICIAN ASSISTANT

## 2025-01-17 RX ORDER — LACTULOSE 10 G/15ML
30 SOLUTION ORAL 4 TIMES DAILY
Status: DISCONTINUED | OUTPATIENT
Start: 2025-01-17 | End: 2025-01-18 | Stop reason: HOSPADM

## 2025-01-17 RX ORDER — LACTULOSE 10 G/15ML
20 SOLUTION ORAL ONCE
Status: COMPLETED | OUTPATIENT
Start: 2025-01-17 | End: 2025-01-17

## 2025-01-17 RX ORDER — CYCLOBENZAPRINE HCL 10 MG
10 TABLET ORAL 3 TIMES DAILY PRN
Status: DISCONTINUED | OUTPATIENT
Start: 2025-01-17 | End: 2025-01-18 | Stop reason: HOSPADM

## 2025-01-17 RX ORDER — LIDOCAINE 50 MG/G
1 PATCH TOPICAL DAILY
Status: DISCONTINUED | OUTPATIENT
Start: 2025-01-18 | End: 2025-01-18 | Stop reason: HOSPADM

## 2025-01-17 RX ORDER — POLYETHYLENE GLYCOL 3350 17 G/17G
17 POWDER, FOR SOLUTION ORAL DAILY PRN
COMMUNITY

## 2025-01-17 RX ORDER — NALOXONE HYDROCHLORIDE 1 MG/ML
2 INJECTION INTRAMUSCULAR; INTRAVENOUS; SUBCUTANEOUS ONCE
Status: COMPLETED | OUTPATIENT
Start: 2025-01-17 | End: 2025-01-17

## 2025-01-17 RX ORDER — CARVEDILOL 6.25 MG/1
6.25 TABLET ORAL 2 TIMES DAILY WITH MEALS
Status: DISCONTINUED | OUTPATIENT
Start: 2025-01-17 | End: 2025-01-18

## 2025-01-17 RX ORDER — CYCLOBENZAPRINE HCL 10 MG
10 TABLET ORAL 3 TIMES DAILY PRN
COMMUNITY

## 2025-01-17 RX ORDER — MELOXICAM 15 MG/1
15 TABLET ORAL DAILY PRN
COMMUNITY
End: 2025-01-23

## 2025-01-17 RX ORDER — SODIUM CHLORIDE 9 MG/ML
75 INJECTION, SOLUTION INTRAVENOUS CONTINUOUS
Status: DISCONTINUED | OUTPATIENT
Start: 2025-01-17 | End: 2025-01-18 | Stop reason: HOSPADM

## 2025-01-17 RX ORDER — ONDANSETRON 2 MG/ML
4 INJECTION INTRAMUSCULAR; INTRAVENOUS EVERY 6 HOURS PRN
Status: DISCONTINUED | OUTPATIENT
Start: 2025-01-17 | End: 2025-01-18 | Stop reason: HOSPADM

## 2025-01-17 RX ORDER — CARVEDILOL 6.25 MG/1
6.25 TABLET ORAL 2 TIMES DAILY WITH MEALS
COMMUNITY
End: 2025-01-23

## 2025-01-17 RX ORDER — OXYCODONE HYDROCHLORIDE 10 MG/1
10 TABLET ORAL EVERY 6 HOURS PRN
COMMUNITY

## 2025-01-17 RX ORDER — AMITRIPTYLINE HYDROCHLORIDE 75 MG/1
75 TABLET ORAL
COMMUNITY

## 2025-01-17 RX ORDER — OXYCODONE AND ACETAMINOPHEN 10; 325 MG/1; MG/1
1 TABLET ORAL EVERY 6 HOURS PRN
COMMUNITY

## 2025-01-17 RX ADMIN — CYCLOBENZAPRINE 10 MG: 10 TABLET, FILM COATED ORAL at 18:31

## 2025-01-17 RX ADMIN — RIFAXIMIN 550 MG: 550 TABLET ORAL at 15:07

## 2025-01-17 RX ADMIN — NALOXONE HYDROCHLORIDE 2 MG: 1 INJECTION PARENTERAL at 15:07

## 2025-01-17 RX ADMIN — SODIUM CHLORIDE 75 ML/HR: 0.9 INJECTION, SOLUTION INTRAVENOUS at 15:07

## 2025-01-17 RX ADMIN — LACTULOSE 30 G: 20 SOLUTION ORAL at 17:54

## 2025-01-17 RX ADMIN — CARVEDILOL 6.25 MG: 6.25 TABLET, FILM COATED ORAL at 17:53

## 2025-01-17 RX ADMIN — LACTULOSE 30 G: 20 SOLUTION ORAL at 15:07

## 2025-01-17 RX ADMIN — LACTULOSE 20 G: 20 SOLUTION ORAL at 10:57

## 2025-01-17 RX ADMIN — LACTULOSE 30 G: 20 SOLUTION ORAL at 21:33

## 2025-01-17 NOTE — ASSESSMENT & PLAN NOTE
Tested positive during this admission.  Recent hospitalization on 12/26 with influenza B as well.  Supportive care only at this time.

## 2025-01-17 NOTE — ASSESSMENT & PLAN NOTE
On chronic Percocet.  Was prescribed 120 tablets on 1/13.  Needs to be weaned off narcotics as this is likely contributing to recurrent hospitalization with hepatic encephalopathy as well.  Will need close outpatient follow-up with pain management upon discharge.

## 2025-01-17 NOTE — ASSESSMENT & PLAN NOTE
Resume Aldactone and torsemide from a.m.  Appears clinically volume depleted.  Will hold overnight and give gentle hydration.  Continue lactulose  LFT and T bili at baseline

## 2025-01-17 NOTE — NURSING NOTE
Call placed to [facility] in [place].  Patient medications updated per pharmacy.  Provider and primary nurse aware.

## 2025-01-17 NOTE — ASSESSMENT & PLAN NOTE
Reports compliance with lactulose TID per .  Patient takes her own medications  Of note, has been taking percocet q6h outpatient for chronic back pain.  Reviewed PDMP and last filled on 1/13(120 tablets)  On admission, opening eyes to verbal command and slow to respond   CT head normal   Ammonia 183on admission,   reports no alcohol use   In review of encephalopathy and reliably able to tolerate oral medications, will give lactulose retention enema x1 followed by po lactulose 30mg every 4 -6hours

## 2025-01-17 NOTE — H&P
H&P - Hospitalist   Name: Bailey Olsen 54 y.o. female I MRN: [id number]  Unit/Bed#: -01 I Date of Admission: 1/17/2025   Date of Service: 1/17/2025 I Hospital Day: 0     Assessment & Plan  Other cirrhosis of liver (HCC)  Resume Aldactone and torsemide from a.m.  Appears clinically volume depleted.  Will hold overnight and give gentle hydration.  Continue lactulose  LFT and T bili at baseline  Hypokalemia  Will replace.  Hepatic encephalopathy (HCC)  Reports compliance with lactulose TID per .  Patient takes her own medications  Of note, has been taking percocet q6h outpatient for chronic back pain.  Reviewed PDMP and last filled on 1/13(120 tablets)  On admission, opening eyes to verbal command and slow to respond   CT head normal   Ammonia 183on admission,   reports no alcohol use   In review of encephalopathy and reliably able to tolerate oral medications, will give lactulose retention enema x1 followed by po lactulose 30mg every 4 -6hours   Intractable back pain  On chronic Percocet.  Was prescribed 120 tablets on 1/13.  Needs to be weaned off narcotics as this is likely contributing to recurrent hospitalization with hepatic encephalopathy as well.  Will need close outpatient follow-up with pain management upon discharge.  Morbid obesity with BMI of 40.0-44.9, adult ([facility])  Lifestyle modification.  Influenza B  Tested positive during this admission.  Recent hospitalization on 12/26 with influenza B as well.  Supportive care only at this time.  Acute metabolic encephalopathy  Multifactorial secondary to hepatic encephalopathy and possible effect of narcotics.  SIRS (systemic inflammatory response syndrome) ([facility])  Present admission as evidenced by tachycardia and leukopenia.  No source of infection identified.  Chest x-ray negative for pneumonia.  UA unremarkable.  Likely secondary to hepatic encephalopathy.  No evidence of SBP.  Continue to monitor off antibiotics.  Continue with IV hydration.   Does have mild lactic acidosis.  Will trend after IV fluid resuscitation..      VTE Pharmacologic Prophylaxis:   Moderate Risk (Score 3-4) - Pharmacological DVT Prophylaxis Ordered: enoxaparin (Lovenox).  Code Status: Prior full code  Discussion with family: Updated  () via phone.    Anticipated Length of Stay: Patient will be admitted on an inpatient basis with an anticipated length of stay of greater than 2 midnights secondary to management as documented above.    History of Present Illness   Chief Complaint: Altered mentation.    Bailey Olsen is a 54 y.o. female with a PMH of liver cirrhosis with recurrent hospitalization for hepatic encephalopathy, chronic back pain and continuous opioid dependence, morbid obesity, pancytopenia, confusion.   reports symptoms have been going on for the last 1 day.  Patient unable to provide much history.   reports patient is compliant with medication including lactulose.  Ammonia level was admitted elevated on admission.  Also tested positive for influenza B.  Recent hospitalization on 426 for same.  Of note patient is on chronic Percocet and had 120 tablets prescribed and filled on 1/13.  Has been states that no other recent changes in medications.  Denied patient having fever or chills Review of Systems   Unable to perform ROS: Mental status change       Historical Information   Past Medical History:   Diagnosis Date    Back pain     Carcinoid tumor     neuroendocrine    CHF (congestive heart failure) (HCC)     Cirrhosis of liver (HCC)     Sarcoidosis      Past Surgical History:   Procedure Laterality Date    CHOLECYSTECTOMY      GASTRIC BYPASS      HERNIA REPAIR      IR KYPHOPLASTY/VERTEBROPLASTY  10/15/2024     Social History     Tobacco Use    Smoking status: Never    Smokeless tobacco: Never   Vaping Use    Vaping status: Never Used   Substance and Sexual Activity    Alcohol use: Never    Drug use: Never    Sexual activity: Not  Currently     E-Cigarette/Vaping    E-Cigarette Use Never User      E-Cigarette/Vaping Substances       Social History:  Marital Status: /Civil Union     Meds/Allergies   I have reviewed home medications with patient personally.  Prior to Admission medications    Medication Sig Start Date End Date Taking? Authorizing Provider   amitriptyline (ELAVIL) 75 mg tablet Take 75 mg by mouth daily at bedtime    Historical Provider, MD   carvedilol (COREG) 6.25 mg tablet Take 6.25 mg by mouth 2 (two) times a day with meals    Historical Provider, MD   cyclobenzaprine (FLEXERIL) 10 mg tablet Take 10 mg by mouth 3 (three) times a day as needed for muscle spasms    Historical Provider, MD   folic acid (FOLVITE) 1 mg tablet Take 1 mg by mouth daily  Patient not taking: Reported on 1/17/2025 8/4/16 2/28/25  Historical Provider, MD   gabapentin (NEURONTIN) 100 mg capsule Take 2 capsules (200 mg total) by mouth 3 (three) times a day  Patient taking differently: Take 200 mg by mouth 2 (two) times a day 8/3/24 12/26/24  Ruslan Keyes MD   hydroxychloroquine (PLAQUENIL) 200 mg tablet Take 200 mg by mouth 2 (two) times a day with meals  Patient not taking: Reported on 1/17/2025    Historical Provider, MD   lactulose (CHRONULAC) 10 g/15 mL solution Take 45 mL (30 g total) by mouth 3 (three) times a day  Patient taking differently: Take 30 g by mouth 4 times a day 12/28/24   Silva Giron PA-C   lidocaine (LIDODERM) 5 % Apply 1 patch topically over 12 hours daily Remove & Discard patch within 12 hours or as directed by MD  Patient not taking: Reported on 1/17/2025 8/4/24   Ruslan Keyes MD   meloxicam (MOBIC) 15 mg tablet Take 15 mg by mouth daily as needed for mild pain    Historical Provider, MD   naloxone (NARCAN) 4 mg/0.1 mL nasal spray 1 spray by Alternating Nares route every 3 (three) minutes as needed for opioid reversal or respiratory depression Administer 1 spray into a nostril. If no response after 2-3 minutes,  give another dose in the other nostril using a new spray.    Historical Provider, MD   nystatin (MYCOSTATIN) powder Apply topically 2 (two) times a day 10/6/24 1/17/25  Idalia Stuart MD   ondansetron (ZOFRAN) 4 mg tablet Take 1 tablet (4 mg total) by mouth every 6 (six) hours  Patient not taking: Reported on 1/17/2025 12/2/24   Arabella Mei DO   oxyCODONE (ROXICODONE) 10 MG TABS Take 10 mg by mouth every 6 (six) hours as needed for moderate pain    Historical Provider, MD   oxyCODONE-acetaminophen (PERCOCET)  mg per tablet Take 1 tablet by mouth every 6 (six) hours as needed for moderate pain    Historical Provider, MD   pantoprazole (PROTONIX) 40 mg tablet Take 1 tablet (40 mg total) by mouth daily in the early morning  Patient not taking: Reported on 1/17/2025 11/27/24   Antonio Anaya DO   polyethylene glycol (MIRALAX) 17 g packet Take 17 g by mouth daily as needed    Historical Provider, MD   rifaximin (XIFAXAN) 550 mg tablet Take 550 mg by mouth every 12 (twelve) hours  Patient not taking: Reported on 1/17/2025    Historical Provider, MD   sertraline (ZOLOFT) 100 mg tablet Take 1 tablet (100 mg total) by mouth daily 7/8/24   Mar Oliva MD   spironolactone (ALDACTONE) 50 mg tablet Take 1 tablet (50 mg total) by mouth daily 11/26/24 12/26/24  Antonio Anaya DO   torsemide (DEMADEX) 20 mg tablet Take 1 tablet (20 mg total) by mouth daily  Patient taking differently: Take 20 mg by mouth 2 (two) times a day 11/26/24 12/26/24  Antonio Anaya DO     Allergies   Allergen Reactions    Zolpidem GI Intolerance, Other (See Comments) and Hives     Other Reaction(s): Other      Blacks out    Blacks out    Tapentadol Sneezing and Other (See Comments)     Zoned out    Alprazolam Other (See Comments)     Other Reaction(s): Other      Feels zoned out    Feels zoned out    Amoxicillin-Pot Clavulanate Hives and Nausea Only     Amoxil fine    Dust Mite Extract Sneezing    Morphine  Headache, Nausea Only and Vomiting    Tramadol GI Intolerance    Azelastine Rash    Azelastine Hcl Rash    Molds & Smuts Rash     Other Reaction(s): Unknown       Objective :  Temp:  [97.4 °F (36.3 °C)-97.7 °F (36.5 °C)] 97.7 °F (36.5 °C)  HR:  [85-96] 86  BP: (122-147)/(57-69) 147/69  Resp:  [14-20] 16  SpO2:  [95 %-97 %] 97 %  O2 Device: None (Room air)    Physical Exam  Constitutional:       General: She is not in acute distress.     Appearance: She is obese. She is ill-appearing.   HENT:      Head: Normocephalic.      Mouth/Throat:      Mouth: Mucous membranes are dry.   Eyes:      Pupils: Pupils are equal, round, and reactive to light.   Cardiovascular:      Rate and Rhythm: Normal rate and regular rhythm.   Pulmonary:      Effort: Pulmonary effort is normal.      Breath sounds: Normal breath sounds.   Abdominal:      General: Bowel sounds are normal. There is no distension.      Palpations: Abdomen is soft.      Tenderness: There is no abdominal tenderness.   Musculoskeletal:      Cervical back: Neck supple.      Right lower leg: No edema.      Left lower leg: No edema.      Comments: No asterixis.   Neurological:      Motor: No weakness.      Comments: Opens eyes.  Slow to respond.  Follows one-step commands.          Lines/Drains:            Lab Results: I have reviewed the following results:  Results from last 7 days   Lab Units 01/17/25  0954   WBC Thousand/uL 3.27*   HEMOGLOBIN g/dL 10.4*   HEMATOCRIT % 32.7*   PLATELETS Thousands/uL 70*   SEGS PCT % 69   LYMPHO PCT % 21   MONO PCT % 6   EOS PCT % 2     Results from last 7 days   Lab Units 01/17/25  0954   SODIUM mmol/L 138   POTASSIUM mmol/L 3.3*   CHLORIDE mmol/L 100   CO2 mmol/L 33*   BUN mg/dL 13   CREATININE mg/dL 0.86   ANION GAP mmol/L 5   CALCIUM mg/dL 8.6   ALBUMIN g/dL 3.3*   TOTAL BILIRUBIN mg/dL 1.47*   ALK PHOS U/L 196*   ALT U/L 16   AST U/L 33   GLUCOSE RANDOM mg/dL 141*     Results from last 7 days   Lab Units 01/17/25  0957   INR  1.41*          Lab Results   Component Value Date    HGBA1C 5.9 (H) 02/27/2024    HGBA1C 5.9 08/03/2023     Results from last 7 days   Lab Units 01/17/25  1251 01/17/25  0954   LACTIC ACID mmol/L 2.5* 2.2*       Imaging Results Review: I reviewed radiology reports from this admission including: chest xray and CT head.      Administrative Statements       ** Please Note: This note has been constructed using a voice recognition system. **

## 2025-01-17 NOTE — ED PROVIDER NOTES
Time reflects when diagnosis was documented in both MDM as applicable and the Disposition within this note       Time User Action Codes Description Comment    1/17/2025 10:48 AM Ashanti Dominique [R41.82] Altered mental status     1/17/2025 10:50 AM Ashanti Dominique [K76.82] Hepatic encephalopathy (HCC)     1/17/2025 10:50 AM Ashanti Dominique [J11.1] Influenza           ED Disposition       ED Disposition   Admit    Condition   Stable    Date/Time   Fri Jan 17, 2025 10:48 AM    Comment   Case was discussed with Dr. Mcqueen and the patient's admission status was agreed to be Admission Status: inpatient status to the service of Dr. Tinsley .               Assessment & Plan       Medical Decision Making  54-year-old female presented to the emergency department for evaluation of altered mental status for the last several days.  Vitals and medical record reviewed.  Patient's presentation, history concerning for hepatic encephalopathy.  Patient is at risk for the following but not limited to toxic metabolic etiologies such as electrolyte disturbances, hypoglycemia and uremia, acidosis states, infection/sepsis, toxidromes of intoxication or withdrawal, hypoxemia or hypercarbia, liver disease or failure causing hepatic encephalopathy, endocrine emergencies such as hyper/hypothyroidism, adrenal insufficiency, seizure, trauma, intracranial bleeds or ischemic stroke.  History and physical as well as work-up consistent with hepatic encephalopathy.  Treatment plan to admit to medicine for further treatment and evaluation.    Problems Addressed:  Altered mental status: acute illness or injury  Hepatic encephalopathy (HCC): acute illness or injury  Influenza: acute illness or injury    Amount and/or Complexity of Data Reviewed  Labs: ordered. Decision-making details documented in ED Course.    Risk  Prescription drug management.  Decision regarding hospitalization.        ED Course as of 01/17/25 1930 Fri Jan 17, 2025    1011 WBC(!): 3.27  Similar to pervious    1011 Hemoglobin(!): 10.4  Similar to previous    1031 Influenza B Rapid Antigen(!): Positive   1032 LACTIC ACID(!): 2.2   1032 Potassium(!): 3.3   1032 Total Bilirubin(!): 1.47  Similar to previous    1032 Ammonia(!): 183   1039 Message sent to medicine requesting admission   1101 Patient accepted for admission        Medications   lactulose (CHRONULAC) oral solution 20 g (20 g Oral Given 1/17/25 1057)       ED Risk Strat Scores                          SBIRT 22yo+      Flowsheet Row Most Recent Value   Initial Alcohol Screen: US AUDIT-C     1. How often do you have a drink containing alcohol? 0 Filed at: 01/17/2025 0949   2. How many drinks containing alcohol do you have on a typical day you are drinking?  0 Filed at: 01/17/2025 0949   3a. Male UNDER 65: How often do you have five or more drinks on one occasion? 0 Filed at: 01/17/2025 0949   3b. FEMALE Any Age, or MALE 65+: How often do you have 4 or more drinks on one occassion? 0 Filed at: 01/17/2025 0949   Audit-C Score 0 Filed at: 01/17/2025 0949   BERNIE: How many times in the past year have you...    Used an illegal drug or used a prescription medication for non-medical reasons? Never Filed at: 01/17/2025 0949                            History of Present Illness       Chief Complaint   Patient presents with    Altered Mental Status     Pt has cirrhosis of liver and knows when Ammonia gets high, she states she is not feeling well. Pt was seen here for same a couple weeks ago and was admitted.        Past Medical History:   Diagnosis Date    Back pain     Carcinoid tumor     neuroendocrine    CHF (congestive heart failure) (HCC)     Cirrhosis of liver (HCC)     Sarcoidosis       Past Surgical History:   Procedure Laterality Date    CHOLECYSTECTOMY      GASTRIC BYPASS      HERNIA REPAIR      IR KYPHOPLASTY/VERTEBROPLASTY  10/15/2024      Family History   Problem Relation Age of Onset    Hypertension Father       Social  History     Tobacco Use    Smoking status: Never    Smokeless tobacco: Never   Vaping Use    Vaping status: Never Used   Substance Use Topics    Alcohol use: Never    Drug use: Never      E-Cigarette/Vaping    E-Cigarette Use Never User       E-Cigarette/Vaping Substances      I have reviewed and agree with the history as documented.     54-year-old female presents to the emergency department with altered mental status.  Patient with past medical history of nonalcoholic cirrhosis with similar presentation with elevated ammonia levels.  Recent admission several weeks ago for the same.  Patient is accompanied by her daughter who reports confusion started 2 to 3 days ago but worsened last night.  Daughter states this is a typical presentation of elevated ammonia for her mother.  Daughter also states patient's been more tired than usual.  Patient reports abdominal pain.  She is confused.  Currently not oriented to place or time however does know her name and who her daughter is.  She denies any drug or alcohol use.   Patient denies any chest pain fevers, cough or congestion, daughter verifies these answers. She typically takes lactulose 4 times daily patient states she has been taking this as prescribed.  Patient states he is also on Percocet for chronic back pain.  Unsure of the accuracy of answers regarding medications due to patient's altered mental status.        Review of Systems   Unable to perform ROS: Mental status change   Constitutional:  Positive for fatigue.   Psychiatric/Behavioral:  Positive for confusion.            Objective       ED Triage Vitals [01/17/25 0944]   Temperature Pulse Blood Pressure Respirations SpO2 Patient Position - Orthostatic VS   (!) 97.4 °F (36.3 °C) 96 144/69 18 95 % Lying      Temp Source Heart Rate Source BP Location FiO2 (%) Pain Score    Temporal Monitor Right arm -- 10 - Worst Possible Pain      Vitals      Date and Time Temp Pulse SpO2 Resp BP Pain Score FACES Pain Rating  User   01/17/25 1757 -- 95 -- -- 147/78 -- -- MW   01/17/25 1532 97.7 °F (36.5 °C) 112 98 % 18 133/74 -- -- DII   01/17/25 1530 97.7 °F (36.5 °C) 106 96 % 18 133/74 -- -- DII   01/17/25 1143 97.7 °F (36.5 °C) 86 97 % 16 147/69 -- -- DII   01/17/25 1142 -- -- 97 % -- -- No Pain -- MW   01/17/25 1100 -- 94 97 % 20 128/61 -- -- MD   01/17/25 1045 -- 85 95 % 14 122/58 -- -- MD   01/17/25 1030 -- 85 95 % 14 122/57 -- -- MD   01/17/25 1027 -- -- -- -- -- 10 - Worst Possible Pain -- MD   01/17/25 0944 97.4 °F (36.3 °C) 96 95 % 18 144/69 10 - Worst Possible Pain -- MY            Physical Exam  Vitals and nursing note reviewed.   Constitutional:       General: She is not in acute distress.     Appearance: She is well-developed. She is obese. She is not toxic-appearing or diaphoretic.      Comments: Somnolent.  Patient falls asleep quickly throughout exam however easily awoken with verbal stimuli   HENT:      Head: Normocephalic.   Eyes:      Extraocular Movements: Extraocular movements intact.      Pupils: Pupils are equal, round, and reactive to light.   Cardiovascular:      Rate and Rhythm: Normal rate and regular rhythm.   Pulmonary:      Effort: Pulmonary effort is normal. No respiratory distress.      Breath sounds: Normal breath sounds. No stridor. No wheezing, rhonchi or rales.   Chest:      Chest wall: No tenderness.   Abdominal:      General: Bowel sounds are normal. There is no distension.      Tenderness: There is abdominal tenderness.      Comments: Mild generalized tenderness    Musculoskeletal:      Cervical back: Normal range of motion.      Right lower leg: Edema present.      Left lower leg: Edema present.   Skin:     General: Skin is warm and dry.   Neurological:      Mental Status: She is alert. She is confused.         Results Reviewed       Procedure Component Value Units Date/Time    Lactic acid 2 Hours [[id number]]  (Abnormal) Collected: 01/17/25 1251    Lab Status: Final result Specimen: Blood from  Hand, Right Updated: 01/17/25 1312     LACTIC ACID 2.5 mmol/L     Narrative:      Result may be elevated if tourniquet was used during collection.    Rapid drug screen, urine [[id number]]  (Abnormal) Collected: 01/17/25 1157    Lab Status: Final result Specimen: Urine, Other Updated: 01/17/25 1224     Amph/Meth UR Negative     Barbiturate Ur Negative     Benzodiazepine Urine Negative     Cocaine Urine Negative     Methadone Urine Negative     Opiate Urine Positive     PCP Ur Negative     THC Urine Negative     Oxycodone Urine Positive     Fentanyl Urine Negative     HYDROCODONE URINE Negative    Narrative:      Presumptive report. If requested, specimen will be sent to reference lab for confirmation.  FOR MEDICAL PURPOSES ONLY.   IF CONFIRMATION NEEDED PLEASE CONTACT THE LAB WITHIN 5 DAYS.    Drug Screen Cutoff Levels:  AMPHETAMINE/METHAMPHETAMINES  1000 ng/mL  BARBITURATES     200 ng/mL  BENZODIAZEPINES     200 ng/mL  COCAINE      300 ng/mL  METHADONE      300 ng/mL  OPIATES      300 ng/mL  PHENCYCLIDINE     25 ng/mL  THC       50 ng/mL  OXYCODONE      100 ng/mL  FENTANYL      5 ng/mL  HYDROCODONE     300 ng/mL    UA (URINE) with reflex to Scope [[id number]]  (Abnormal) Collected: 01/17/25 1157    Lab Status: Final result Specimen: Urine, Other Updated: 01/17/25 1208     Color, UA Yellow     Clarity, UA Clear     Specific Gravity, UA <=1.005     pH, UA 7.0     Leukocytes, UA Negative     Nitrite, UA Negative     Protein, UA Negative mg/dl      Glucose, UA Negative mg/dl      Ketones, UA Negative mg/dl      Urobilinogen, UA 1.0 E.U./dl      Bilirubin, UA Negative     Occult Blood, UA Trace-Intact    Ammonia [[id number]]  (Abnormal) Collected: 01/17/25 0954    Lab Status: Final result Specimen: Blood from Arm, Right Updated: 01/17/25 1023     Ammonia 183 umol/L     Ethanol [[id number]]  (Normal) Collected: 01/17/25 0954    Lab Status: Final result Specimen: Blood from Arm, Right Updated: 01/17/25 1023     Ethanol Lvl <10  mg/dL     Comprehensive metabolic panel [[id number]]  (Abnormal) Collected: 01/17/25 0954    Lab Status: Final result Specimen: Blood from Arm, Right Updated: 01/17/25 1023     Sodium 138 mmol/L      Potassium 3.3 mmol/L      Chloride 100 mmol/L      CO2 33 mmol/L      ANION GAP 5 mmol/L      BUN 13 mg/dL      Creatinine 0.86 mg/dL      Glucose 141 mg/dL      Calcium 8.6 mg/dL      Corrected Calcium 9.2 mg/dL      AST 33 U/L      ALT 16 U/L      Alkaline Phosphatase 196 U/L      Total Protein 7.6 g/dL      Albumin 3.3 g/dL      Total Bilirubin 1.47 mg/dL      eGFR 76 ml/min/1.73sq m     Narrative:      National Kid[employer] guidelines for Chronic Kidney Disease (CKD):     Stage 1 with normal or high GFR (GFR > 90 mL/min/1.73 square meters)    Stage 2 Mild CKD (GFR = 60-89 mL/min/1.73 square meters)    Stage 3A Moderate CKD (GFR = 45-59 mL/min/1.73 square meters)    Stage 3B Moderate CKD (GFR = 30-44 mL/min/1.73 square meters)    Stage 4 Severe CKD (GFR = 15-29 mL/min/1.73 square meters)    Stage 5 End Stage CKD (GFR <15 mL/min/1.73 square meters)  Note: GFR calculation is accurate only with a steady state creatinine    Lipase [[id number]]  (Abnormal) Collected: 01/17/25 0954    Lab Status: Final result Specimen: Blood from Arm, Right Updated: 01/17/25 1023     Lipase 7 u/L     FLU/COVID Rapid Antigen (30 min. TAT) - Preferred screening test in ED [[id number]]  (Abnormal) Collected: 01/17/25 0954    Lab Status: Final result Specimen: Nares from Nose Updated: 01/17/25 1022     SARS COV Rapid Antigen Negative     Influenza A Rapid Antigen Negative     Influenza B Rapid Antigen Positive    Narrative:      This test has been performed using the [employer] [facility] 2 FLU+SARS Antigen test under the Emergency Use Authorization (EUA). This test has been validated by the  and verified by the performing laboratory. The Magdalena uses lateral flow immunofluorescent sandwich assay to detect SARS-COV, Influenza A and  Influenza B Antigen.     The [employer] Magdalena 2 SARS Antigen test does not differentiate between SARS-CoV and SARS-CoV-2.     Negative results are presumptive and may be confirmed with a molecular assay, if necessary, for patient management. Negative results do not rule out SARS-CoV-2 or influenza infection and should not be used as the sole basis for treatment or patient management decisions. A negative test result may occur if the level of antigen in a sample is below the limit of detection of this test.     Positive results are indicative of the presence of viral antigens, but do not rule out bacterial infection or co-infection with other viruses.     All test results should be used as an adjunct to clinical observations and other information available to the provider.    FOR PEDIATRIC PATIENTS - copy/paste COVID Guidelines URL to browser: [email or web address]    Lactic acid, plasma (w/reflex if result > 2.0) [[id number]]  (Abnormal) Collected: 01/17/25 0954    Lab Status: Final result Specimen: Blood from Arm, Right Updated: 01/17/25 1021     LACTIC ACID 2.2 mmol/L     Narrative:      Result may be elevated if tourniquet was used during collection.    Protime-INR [[id number]]  (Abnormal) Collected: 01/17/25 0957    Lab Status: Final result Specimen: Blood from Arm, Right Updated: 01/17/25 1020     Protime 17.6 seconds      INR 1.41    Narrative:      INR Therapeutic Range    Indication                                             INR Range      Atrial Fibrillation                                               2.0-3.0  Hypercoagulable State                                    2.0.2.3  Left Ventricular Asist Device                            2.0-3.0  Mechanical Heart Valve                                  -    Aortic(with afib, MI, embolism, HF, LA enlargement,    and/or coagulopathy)                                     2.0-3.0 (2.5-3.5)     Mitral                                                              2.5-3.5  Prosthetic/Bioprosthetic Heart Valve               2.0-3.0  Venous thromboembolism (VTE: VT, PE        2.0-3.0    APTT [[id number]]  (Abnormal) Collected: 01/17/25 0957    Lab Status: Final result Specimen: Blood from Arm, Right Updated: 01/17/25 1020     PTT 35 seconds     CBC and differential [[id number]]  (Abnormal) Collected: 01/17/25 0954    Lab Status: Final result Specimen: Blood from Arm, Right Updated: 01/17/25 1010     WBC 3.27 Thousand/uL      RBC 3.39 Million/uL      Hemoglobin 10.4 g/dL      Hematocrit 32.7 %      MCV 97 fL      MCH 30.7 pg      MCHC 31.8 g/dL      RDW 15.4 %      MPV 10.1 fL      Platelets 70 Thousands/uL      nRBC 0 /100 WBCs      Segmented % 69 %      Immature Grans % 0 %      Lymphocytes % 21 %      Monocytes % 6 %      Eosinophils Relative 2 %      Basophils Relative 2 %      Absolute Neutrophils 2.25 Thousands/µL      Absolute Immature Grans 0.01 Thousand/uL      Absolute Lymphocytes 0.70 Thousands/µL      Absolute Monocytes 0.21 Thousand/µL      Eosinophils Absolute 0.05 Thousand/µL      Basophils Absolute 0.05 Thousands/µL             No orders to display       Procedures    ED Medication and Procedure Management   Prior to Admission Medications   Prescriptions Last Dose Informant Patient Reported? Taking?   amitriptyline (ELAVIL) 75 mg tablet Unknown Pharmacy (Specify) Yes No   Sig: Take 75 mg by mouth daily at bedtime   carvedilol (COREG) 6.25 mg tablet Unknown Pharmacy (Specify) Yes No   Sig: Take 6.25 mg by mouth 2 (two) times a day with meals   cyclobenzaprine (FLEXERIL) 10 mg tablet Unknown Pharmacy (Specify) Yes No   Sig: Take 10 mg by mouth 3 (three) times a day as needed for muscle spasms   folic acid (FOLVITE) 1 mg tablet Not Taking  Yes No   Sig: Take 1 mg by mouth daily   Patient not taking: Reported on 1/17/2025   gabapentin (NEURONTIN) 100 mg capsule  Pharmacy (Specify) No No   Sig: Take 2 capsules (200 mg total) by  mouth 3 (three) times a day   Patient taking differently: Take 200 mg by mouth 2 (two) times a day   hydroxychloroquine (PLAQUENIL) 200 mg tablet Not Taking  Yes No   Sig: Take 200 mg by mouth 2 (two) times a day with meals   Patient not taking: Reported on 2025   lactulose (CHRONULAC) 10 g/15 mL solution Unknown Pharmacy (Specify) No No   Sig: Take 45 mL (30 g total) by mouth 3 (three) times a day   Patient taking differently: Take 30 g by mouth 4 times a day   lidocaine (LIDODERM) 5 % Not Taking Pharmacy (Specify) No No   Sig: Apply 1 patch topically over 12 hours daily Remove & Discard patch within 12 hours or as directed by MD   Patient not taking: Reported on 2025   meloxicam (MOBIC) 15 mg tablet Unknown Pharmacy (Specify) Yes No   Sig: Take 15 mg by mouth daily as needed for mild pain   naloxone (NARCAN) 4 mg/0.1 mL nasal spray Unknown Pharmacy (Specify) Yes No   Si spray by Alternating Nares route every 3 (three) minutes as needed for opioid reversal or respiratory depression Administer 1 spray into a nostril. If no response after 2-3 minutes, give another dose in the other nostril using a new spray.   nystatin (MYCOSTATIN) powder Unknown  No No   Sig: Apply topically 2 (two) times a day   ondansetron (ZOFRAN) 4 mg tablet Not Taking  No No   Sig: Take 1 tablet (4 mg total) by mouth every 6 (six) hours   Patient not taking: Reported on 2025   oxyCODONE (ROXICODONE) 10 MG TABS Unknown Pharmacy (Specify) Yes No   Sig: Take 10 mg by mouth every 6 (six) hours as needed for moderate pain   oxyCODONE-acetaminophen (PERCOCET)  mg per tablet Unknown Pharmacy (Specify) Yes No   Sig: Take 1 tablet by mouth every 6 (six) hours as needed for moderate pain   pantoprazole (PROTONIX) 40 mg tablet Not Taking  No No   Sig: Take 1 tablet (40 mg total) by mouth daily in the early morning   Patient not taking: Reported on 2025   polyethylene glycol (MIRALAX) 17 g packet Unknown Pharmacy (Specify) Yes  No   Sig: Take 17 g by mouth daily as needed   rifaximin (XIFAXAN) 550 mg tablet Not Taking  Yes No   Sig: Take 550 mg by mouth every 12 (twelve) hours   Patient not taking: Reported on 1/17/2025   sertraline (ZOLOFT) 100 mg tablet Unknown  No No   Sig: Take 1 tablet (100 mg total) by mouth daily   spironolactone (ALDACTONE) 50 mg tablet Unknown Pharmacy (Specify) No No   Sig: Take 1 tablet (50 mg total) by mouth daily   torsemide (DEMADEX) 20 mg tablet  Pharmacy (Specify) No No   Sig: Take 1 tablet (20 mg total) by mouth daily   Patient taking differently: Take 20 mg by mouth 2 (two) times a day      Facility-Administered Medications: None     Current Discharge Medication List        CONTINUE these medications which have NOT CHANGED    Details   amitriptyline (ELAVIL) 75 mg tablet Take 75 mg by mouth daily at bedtime      carvedilol (COREG) 6.25 mg tablet Take 6.25 mg by mouth 2 (two) times a day with meals      cyclobenzaprine (FLEXERIL) 10 mg tablet Take 10 mg by mouth 3 (three) times a day as needed for muscle spasms      folic acid (FOLVITE) 1 mg tablet Take 1 mg by mouth daily      gabapentin (NEURONTIN) 100 mg capsule Take 2 capsules (200 mg total) by mouth 3 (three) times a day  Qty: 180 capsule, Refills: 0    Associated Diagnoses: Sarcoidosis      hydroxychloroquine (PLAQUENIL) 200 mg tablet Take 200 mg by mouth 2 (two) times a day with meals      lactulose (CHRONULAC) 10 g/15 mL solution Take 45 mL (30 g total) by mouth 3 (three) times a day  Qty: 240 mL, Refills: 0    Associated Diagnoses: Hyperammonemia (HCC)      lidocaine (LIDODERM) 5 % Apply 1 patch topically over 12 hours daily Remove & Discard patch within 12 hours or as directed by MD  Qty: 9 patch, Refills: 0    Associated Diagnoses: Closed compression fracture of body of L1 vertebra (HCC); Sacral fracture (HCC)      meloxicam (MOBIC) 15 mg tablet Take 15 mg by mouth daily as needed for mild pain      naloxone (NARCAN) 4 mg/0.1 mL nasal spray 1  spray by Alternating Nares route every 3 (three) minutes as needed for opioid reversal or respiratory depression Administer 1 spray into a nostril. If no response after 2-3 minutes, give another dose in the other nostril using a new spray.      nystatin (MYCOSTATIN) powder Apply topically 2 (two) times a day  Qty: 30 g, Refills: 0    Associated Diagnoses: Closed compression fracture of L1 lumbar vertebra, sequela      ondansetron (ZOFRAN) 4 mg tablet Take 1 tablet (4 mg total) by mouth every 6 (six) hours  Qty: 20 tablet, Refills: 0    Associated Diagnoses: Nausea      oxyCODONE (ROXICODONE) 10 MG TABS Take 10 mg by mouth every 6 (six) hours as needed for moderate pain      oxyCODONE-acetaminophen (PERCOCET)  mg per tablet Take 1 tablet by mouth every 6 (six) hours as needed for moderate pain      pantoprazole (PROTONIX) 40 mg tablet Take 1 tablet (40 mg total) by mouth daily in the early morning  Qty: 30 tablet, Refills: 0    Associated Diagnoses: Hepatic encephalopathy (HCC)      polyethylene glycol (MIRALAX) 17 g packet Take 17 g by mouth daily as needed      rifaximin (XIFAXAN) 550 mg tablet Take 550 mg by mouth every 12 (twelve) hours      sertraline (ZOLOFT) 100 mg tablet Take 1 tablet (100 mg total) by mouth daily  Qty: 30 tablet, Refills: 0    Associated Diagnoses: Sarcoidosis      spironolactone (ALDACTONE) 50 mg tablet Take 1 tablet (50 mg total) by mouth daily  Qty: 30 tablet, Refills: 0    Associated Diagnoses: CHF (congestive heart failure) (HCC)      torsemide (DEMADEX) 20 mg tablet Take 1 tablet (20 mg total) by mouth daily  Qty: 30 tablet, Refills: 0    Associated Diagnoses: CHF (congestive heart failure) (HCC)           No discharge procedures on file.  ED SEPSIS DOCUMENTATION   Time reflects when diagnosis was documented in both MDM as applicable and the Disposition within this note       Time User Action Codes Description Comment    1/17/2025 10:48 AM Ashanti Dominique Add [R41.82] Altered  mental status     1/17/2025 10:50 AM Ashanti Dominique [K76.82] Hepatic encephalopathy (HCC)     1/17/2025 10:50 AM Ashanti Dominique [J11.1] Influenza                  Ashanti Dominique PA-C  01/17/25 1930

## 2025-01-17 NOTE — ASSESSMENT & PLAN NOTE
Present admission as evidenced by tachycardia and leukopenia.  No source of infection identified.  Chest x-ray negative for pneumonia.  UA unremarkable.  Likely secondary to hepatic encephalopathy.  No evidence of SBP.  Continue to monitor off antibiotics.  Continue with IV hydration.  Does have mild lactic acidosis.  Will trend after IV fluid resuscitation..

## 2025-01-18 ENCOUNTER — APPOINTMENT (INPATIENT)
Dept: GASTROENTEROLOGY | Facility: HOSPITAL | Age: 55
DRG: 377 | End: 2025-01-18
Attending: INTERNAL MEDICINE
Payer: MEDICARE

## 2025-01-18 ENCOUNTER — APPOINTMENT (INPATIENT)
Dept: GASTROENTEROLOGY | Facility: HOSPITAL | Age: 55
DRG: 377 | End: 2025-01-18
Payer: MEDICARE

## 2025-01-18 ENCOUNTER — APPOINTMENT (INPATIENT)
Dept: CT IMAGING | Facility: HOSPITAL | Age: 55
DRG: 193 | End: 2025-01-18
Payer: MEDICARE

## 2025-01-18 ENCOUNTER — APPOINTMENT (INPATIENT)
Dept: RADIOLOGY | Facility: HOSPITAL | Age: 55
DRG: 377 | End: 2025-01-18
Payer: MEDICARE

## 2025-01-18 ENCOUNTER — HOSPITAL ENCOUNTER (INPATIENT)
Facility: HOSPITAL | Age: 55
LOS: 5 days | Discharge: HOME HEALTH | DRG: 377 | End: 2025-01-23
Attending: INTERNAL MEDICINE | Admitting: INTERNAL MEDICINE
Payer: MEDICARE

## 2025-01-18 VITALS
HEART RATE: 114 BPM | BODY MASS INDEX: 41.65 KG/M2 | SYSTOLIC BLOOD PRESSURE: 111 MMHG | RESPIRATION RATE: 23 BRPM | HEIGHT: 65 IN | TEMPERATURE: 99.3 F | DIASTOLIC BLOOD PRESSURE: 66 MMHG | OXYGEN SATURATION: 99 % | WEIGHT: 250 LBS

## 2025-01-18 DIAGNOSIS — J96.01 ACUTE HYPOXIC RESPIRATORY FAILURE (HCC): ICD-10-CM

## 2025-01-18 DIAGNOSIS — K62.5 RECTAL BLEEDING: Primary | ICD-10-CM

## 2025-01-18 PROBLEM — K92.2 UPPER GI BLEED: Status: ACTIVE | Noted: 2025-01-18

## 2025-01-18 PROBLEM — F11.90 CHRONIC, CONTINUOUS USE OF OPIOIDS: Chronic | Status: ACTIVE | Noted: 2025-01-18

## 2025-01-18 PROBLEM — D64.9 ACUTE ANEMIA: Status: ACTIVE | Noted: 2025-01-18

## 2025-01-18 PROBLEM — D62 ACUTE BLOOD LOSS ANEMIA: Status: ACTIVE | Noted: 2025-01-18

## 2025-01-18 LAB
ABO GROUP BLD BPU: NORMAL
ABO GROUP BLD BPU: NORMAL
ABO GROUP BLD: NORMAL
ABO GROUP BLD: NORMAL
ALBUMIN SERPL BCG-MCNC: 2.1 G/DL (ref 3.5–5)
ALP SERPL-CCNC: 132 U/L (ref 34–104)
ALT SERPL W P-5'-P-CCNC: 12 U/L (ref 7–52)
AMMONIA PLAS-SCNC: 94 UMOL/L (ref 18–72)
ANION GAP SERPL CALCULATED.3IONS-SCNC: 4 MMOL/L (ref 4–13)
ARTERIAL PATENCY WRIST A: YES
AST SERPL W P-5'-P-CCNC: 31 U/L (ref 13–39)
BASE EXCESS BLDA CALC-SCNC: -1.2 MMOL/L
BASOPHILS # BLD AUTO: 0.02 THOUSANDS/ΜL (ref 0–0.1)
BASOPHILS # BLD AUTO: 0.07 THOUSANDS/ΜL (ref 0–0.1)
BASOPHILS NFR BLD AUTO: 0 % (ref 0–1)
BASOPHILS NFR BLD AUTO: 1 % (ref 0–1)
BILIRUB SERPL-MCNC: 1.7 MG/DL (ref 0.2–1)
BLD GP AB SCN SERPL QL: NEGATIVE
BLD GP AB SCN SERPL QL: NEGATIVE
BPU ID: NORMAL
BPU ID: NORMAL
BUN SERPL-MCNC: 14 MG/DL (ref 5–25)
CALCIUM ALBUM COR SERPL-MCNC: 9.3 MG/DL (ref 8.3–10.1)
CALCIUM SERPL-MCNC: 7.8 MG/DL (ref 8.4–10.2)
CHLORIDE SERPL-SCNC: 111 MMOL/L (ref 96–108)
CO2 SERPL-SCNC: 27 MMOL/L (ref 21–32)
CREAT SERPL-MCNC: 0.7 MG/DL (ref 0.6–1.3)
EOSINOPHIL # BLD AUTO: 0 THOUSAND/ΜL (ref 0–0.61)
EOSINOPHIL # BLD AUTO: 0.04 THOUSAND/ΜL (ref 0–0.61)
EOSINOPHIL NFR BLD AUTO: 0 % (ref 0–6)
EOSINOPHIL NFR BLD AUTO: 1 % (ref 0–6)
ERYTHROCYTE [DISTWIDTH] IN BLOOD BY AUTOMATED COUNT: 15.6 % (ref 11.6–15.1)
ERYTHROCYTE [DISTWIDTH] IN BLOOD BY AUTOMATED COUNT: 16.5 % (ref 11.6–15.1)
GFR SERPL CREATININE-BSD FRML MDRD: 97 ML/MIN/1.73SQ M
GLUCOSE SERPL-MCNC: 140 MG/DL (ref 65–140)
HCO3 BLDA-SCNC: 20.4 MMOL/L (ref 22–28)
HCT VFR BLD AUTO: 16.5 % (ref 34.8–46.1)
HCT VFR BLD AUTO: 18.5 % (ref 34.8–46.1)
HCT VFR BLD AUTO: 18.9 % (ref 34.8–46.1)
HGB BLD-MCNC: 5.6 G/DL (ref 11.5–15.4)
HGB BLD-MCNC: 5.9 G/DL (ref 11.5–15.4)
HGB BLD-MCNC: 6.1 G/DL (ref 11.5–15.4)
HGB BLD-MCNC: 7.6 G/DL (ref 11.5–15.4)
IMM GRANULOCYTES # BLD AUTO: 0.02 THOUSAND/UL (ref 0–0.2)
IMM GRANULOCYTES # BLD AUTO: 0.03 THOUSAND/UL (ref 0–0.2)
IMM GRANULOCYTES NFR BLD AUTO: 0 % (ref 0–2)
IMM GRANULOCYTES NFR BLD AUTO: 1 % (ref 0–2)
INR PPP: 2.19 (ref 0.85–1.19)
INR PPP: 2.32 (ref 0.85–1.19)
LACTATE SERPL-SCNC: 3.5 MMOL/L (ref 0.5–2)
LACTATE SERPL-SCNC: 3.6 MMOL/L (ref 0.5–2)
LYMPHOCYTES # BLD AUTO: 0.85 THOUSANDS/ΜL (ref 0.6–4.47)
LYMPHOCYTES # BLD AUTO: 1.38 THOUSANDS/ΜL (ref 0.6–4.47)
LYMPHOCYTES NFR BLD AUTO: 13 % (ref 14–44)
LYMPHOCYTES NFR BLD AUTO: 19 % (ref 14–44)
MAGNESIUM SERPL-MCNC: 1.8 MG/DL (ref 1.9–2.7)
MCH RBC QN AUTO: 30.3 PG (ref 26.8–34.3)
MCH RBC QN AUTO: 30.7 PG (ref 26.8–34.3)
MCHC RBC AUTO-ENTMCNC: 31.9 G/DL (ref 31.4–37.4)
MCHC RBC AUTO-ENTMCNC: 32.3 G/DL (ref 31.4–37.4)
MCV RBC AUTO: 94 FL (ref 82–98)
MCV RBC AUTO: 96 FL (ref 82–98)
MONOCYTES # BLD AUTO: 0.34 THOUSAND/ΜL (ref 0.17–1.22)
MONOCYTES # BLD AUTO: 0.4 THOUSAND/ΜL (ref 0.17–1.22)
MONOCYTES NFR BLD AUTO: 5 % (ref 4–12)
MONOCYTES NFR BLD AUTO: 6 % (ref 4–12)
NASAL CANNULA: 6
NEUTROPHILS # BLD AUTO: 5.23 THOUSANDS/ΜL (ref 1.85–7.62)
NEUTROPHILS # BLD AUTO: 5.38 THOUSANDS/ΜL (ref 1.85–7.62)
NEUTS SEG NFR BLD AUTO: 74 % (ref 43–75)
NEUTS SEG NFR BLD AUTO: 80 % (ref 43–75)
NRBC BLD AUTO-RTO: 0 /100 WBCS
NRBC BLD AUTO-RTO: 0 /100 WBCS
O2 CT BLDA-SCNC: 10.1 ML/DL (ref 16–23)
OXYHGB MFR BLDA: 95.9 % (ref 94–97)
PCO2 BLDA: 23 MM HG (ref 36–44)
PH BLDA: 7.57 [PH] (ref 7.35–7.45)
PLATELET # BLD AUTO: 108 THOUSANDS/UL (ref 149–390)
PLATELET # BLD AUTO: 79 THOUSANDS/UL (ref 149–390)
PMV BLD AUTO: 10.6 FL (ref 8.9–12.7)
PMV BLD AUTO: 11.2 FL (ref 8.9–12.7)
PO2 BLDA: 114.3 MM HG (ref 75–129)
POTASSIUM SERPL-SCNC: 4.2 MMOL/L (ref 3.5–5.3)
PROT SERPL-MCNC: 4.6 G/DL (ref 6.4–8.4)
PROTHROMBIN TIME: 24.5 SECONDS (ref 12.3–15)
PROTHROMBIN TIME: 25.2 SECONDS (ref 12.3–15)
RBC # BLD AUTO: 1.92 MILLION/UL (ref 3.81–5.12)
RBC # BLD AUTO: 2.01 MILLION/UL (ref 3.81–5.12)
RH BLD: POSITIVE
RH BLD: POSITIVE
SODIUM SERPL-SCNC: 142 MMOL/L (ref 135–147)
SPECIMEN EXPIRATION DATE: NORMAL
SPECIMEN EXPIRATION DATE: NORMAL
SPECIMEN SOURCE: ABNORMAL
UNIT DISPENSE STATUS: NORMAL
UNIT DISPENSE STATUS: NORMAL
UNIT PRODUCT CODE: NORMAL
UNIT PRODUCT CODE: NORMAL
UNIT PRODUCT VOLUME: 280 ML
UNIT PRODUCT VOLUME: 280 ML
UNIT RH: NORMAL
UNIT RH: NORMAL
WBC # BLD AUTO: 6.53 THOUSAND/UL (ref 4.31–10.16)
WBC # BLD AUTO: 7.23 THOUSAND/UL (ref 4.31–10.16)

## 2025-01-18 PROCEDURE — 0BH17EZ INSERTION OF ENDOTRACHEAL AIRWAY INTO TRACHEA, VIA NATURAL OR ARTIFICIAL OPENING: ICD-10-PCS | Performed by: STUDENT IN AN ORGANIZED HEALTH CARE EDUCATION/TRAINING PROGRAM

## 2025-01-18 PROCEDURE — 36600 WITHDRAWAL OF ARTERIAL BLOOD: CPT

## 2025-01-18 PROCEDURE — 86901 BLOOD TYPING SEROLOGIC RH(D): CPT | Performed by: NURSE PRACTITIONER

## 2025-01-18 PROCEDURE — 85610 PROTHROMBIN TIME: CPT | Performed by: INTERNAL MEDICINE

## 2025-01-18 PROCEDURE — 99291 CRITICAL CARE FIRST HOUR: CPT | Performed by: INTERNAL MEDICINE

## 2025-01-18 PROCEDURE — 99255 IP/OBS CONSLTJ NEW/EST HI 80: CPT | Performed by: INTERNAL MEDICINE

## 2025-01-18 PROCEDURE — 43235 EGD DIAGNOSTIC BRUSH WASH: CPT | Performed by: INTERNAL MEDICINE

## 2025-01-18 PROCEDURE — 94002 VENT MGMT INPAT INIT DAY: CPT

## 2025-01-18 PROCEDURE — 99239 HOSP IP/OBS DSCHRG MGMT >30: CPT | Performed by: INTERNAL MEDICINE

## 2025-01-18 PROCEDURE — 74178 CT ABD&PLV WO CNTR FLWD CNTR: CPT

## 2025-01-18 PROCEDURE — 85018 HEMOGLOBIN: CPT | Performed by: NURSE PRACTITIONER

## 2025-01-18 PROCEDURE — 85610 PROTHROMBIN TIME: CPT | Performed by: NURSE PRACTITIONER

## 2025-01-18 PROCEDURE — 86920 COMPATIBILITY TEST SPIN: CPT

## 2025-01-18 PROCEDURE — 83735 ASSAY OF MAGNESIUM: CPT | Performed by: INTERNAL MEDICINE

## 2025-01-18 PROCEDURE — 86850 RBC ANTIBODY SCREEN: CPT

## 2025-01-18 PROCEDURE — NC001 PR NO CHARGE: Performed by: INTERNAL MEDICINE

## 2025-01-18 PROCEDURE — 82805 BLOOD GASES W/O2 SATURATION: CPT | Performed by: NURSE PRACTITIONER

## 2025-01-18 PROCEDURE — 85014 HEMATOCRIT: CPT | Performed by: STUDENT IN AN ORGANIZED HEALTH CARE EDUCATION/TRAINING PROGRAM

## 2025-01-18 PROCEDURE — 86900 BLOOD TYPING SEROLOGIC ABO: CPT | Performed by: NURSE PRACTITIONER

## 2025-01-18 PROCEDURE — 0DJD8ZZ INSPECTION OF LOWER INTESTINAL TRACT, VIA NATURAL OR ARTIFICIAL OPENING ENDOSCOPIC: ICD-10-PCS | Performed by: INTERNAL MEDICINE

## 2025-01-18 PROCEDURE — P9016 RBC LEUKOCYTES REDUCED: HCPCS

## 2025-01-18 PROCEDURE — 3E1G88Z IRRIGATION OF UPPER GI USING IRRIGATING SUBSTANCE, VIA NATURAL OR ARTIFICIAL OPENING ENDOSCOPIC: ICD-10-PCS | Performed by: INTERNAL MEDICINE

## 2025-01-18 PROCEDURE — 30233N1 TRANSFUSION OF NONAUTOLOGOUS RED BLOOD CELLS INTO PERIPHERAL VEIN, PERCUTANEOUS APPROACH: ICD-10-PCS | Performed by: INTERNAL MEDICINE

## 2025-01-18 PROCEDURE — 85025 COMPLETE CBC W/AUTO DIFF WBC: CPT | Performed by: INTERNAL MEDICINE

## 2025-01-18 PROCEDURE — P9040 RBC LEUKOREDUCED IRRADIATED: HCPCS

## 2025-01-18 PROCEDURE — 80053 COMPREHEN METABOLIC PANEL: CPT | Performed by: INTERNAL MEDICINE

## 2025-01-18 PROCEDURE — 86901 BLOOD TYPING SEROLOGIC RH(D): CPT

## 2025-01-18 PROCEDURE — 45378 DIAGNOSTIC COLONOSCOPY: CPT | Performed by: INTERNAL MEDICINE

## 2025-01-18 PROCEDURE — 85018 HEMOGLOBIN: CPT | Performed by: STUDENT IN AN ORGANIZED HEALTH CARE EDUCATION/TRAINING PROGRAM

## 2025-01-18 PROCEDURE — 71045 X-RAY EXAM CHEST 1 VIEW: CPT

## 2025-01-18 PROCEDURE — 82140 ASSAY OF AMMONIA: CPT | Performed by: INTERNAL MEDICINE

## 2025-01-18 PROCEDURE — 31500 INSERT EMERGENCY AIRWAY: CPT | Performed by: INTERNAL MEDICINE

## 2025-01-18 PROCEDURE — 5A1935Z RESPIRATORY VENTILATION, LESS THAN 24 CONSECUTIVE HOURS: ICD-10-PCS | Performed by: STUDENT IN AN ORGANIZED HEALTH CARE EDUCATION/TRAINING PROGRAM

## 2025-01-18 PROCEDURE — 83605 ASSAY OF LACTIC ACID: CPT | Performed by: INTERNAL MEDICINE

## 2025-01-18 PROCEDURE — 86900 BLOOD TYPING SEROLOGIC ABO: CPT

## 2025-01-18 PROCEDURE — 30233N1 TRANSFUSION OF NONAUTOLOGOUS RED BLOOD CELLS INTO PERIPHERAL VEIN, PERCUTANEOUS APPROACH: ICD-10-PCS | Performed by: STUDENT IN AN ORGANIZED HEALTH CARE EDUCATION/TRAINING PROGRAM

## 2025-01-18 PROCEDURE — 86850 RBC ANTIBODY SCREEN: CPT | Performed by: NURSE PRACTITIONER

## 2025-01-18 PROCEDURE — 85025 COMPLETE CBC W/AUTO DIFF WBC: CPT | Performed by: NURSE PRACTITIONER

## 2025-01-18 RX ORDER — ONDANSETRON 2 MG/ML
4 INJECTION INTRAMUSCULAR; INTRAVENOUS EVERY 6 HOURS PRN
Status: CANCELLED | OUTPATIENT
Start: 2025-01-18

## 2025-01-18 RX ORDER — CARVEDILOL 3.12 MG/1
3.12 TABLET ORAL 2 TIMES DAILY WITH MEALS
Status: DISCONTINUED | OUTPATIENT
Start: 2025-01-18 | End: 2025-01-18 | Stop reason: HOSPADM

## 2025-01-18 RX ORDER — HYDROMORPHONE HCL/PF 1 MG/ML
0.5 SYRINGE (ML) INJECTION EVERY 4 HOURS PRN
Status: DISCONTINUED | OUTPATIENT
Start: 2025-01-18 | End: 2025-01-21

## 2025-01-18 RX ORDER — PANTOPRAZOLE SODIUM 40 MG/10ML
40 INJECTION, POWDER, LYOPHILIZED, FOR SOLUTION INTRAVENOUS EVERY 12 HOURS SCHEDULED
Status: DISCONTINUED | OUTPATIENT
Start: 2025-01-18 | End: 2025-01-18

## 2025-01-18 RX ORDER — PROPOFOL 10 MG/ML
5-50 INJECTION, EMULSION INTRAVENOUS
Status: DISCONTINUED | OUTPATIENT
Start: 2025-01-18 | End: 2025-01-18

## 2025-01-18 RX ORDER — OSELTAMIVIR PHOSPHATE 75 MG/1
75 CAPSULE ORAL EVERY 12 HOURS SCHEDULED
Status: DISCONTINUED | OUTPATIENT
Start: 2025-01-18 | End: 2025-01-20

## 2025-01-18 RX ORDER — CEFTRIAXONE 1 G/50ML
1000 INJECTION, SOLUTION INTRAVENOUS EVERY 24 HOURS
Status: CANCELLED | OUTPATIENT
Start: 2025-01-19

## 2025-01-18 RX ORDER — SODIUM CHLORIDE, SODIUM LACTATE, POTASSIUM CHLORIDE, CALCIUM CHLORIDE 600; 310; 30; 20 MG/100ML; MG/100ML; MG/100ML; MG/100ML
125 INJECTION, SOLUTION INTRAVENOUS CONTINUOUS
Status: DISCONTINUED | OUTPATIENT
Start: 2025-01-18 | End: 2025-01-19

## 2025-01-18 RX ORDER — MIDAZOLAM HYDROCHLORIDE 2 MG/2ML
INJECTION, SOLUTION INTRAMUSCULAR; INTRAVENOUS
Status: DISPENSED
Start: 2025-01-18 | End: 2025-01-19

## 2025-01-18 RX ORDER — CYCLOBENZAPRINE HCL 10 MG
10 TABLET ORAL 3 TIMES DAILY PRN
Status: CANCELLED | OUTPATIENT
Start: 2025-01-18

## 2025-01-18 RX ORDER — OCTREOTIDE ACETATE 50 UG/ML
50 INJECTION, SOLUTION INTRAVENOUS; SUBCUTANEOUS ONCE
Status: COMPLETED | OUTPATIENT
Start: 2025-01-18 | End: 2025-01-18

## 2025-01-18 RX ORDER — CARVEDILOL 3.12 MG/1
3.12 TABLET ORAL 2 TIMES DAILY WITH MEALS
Status: DISCONTINUED | OUTPATIENT
Start: 2025-01-18 | End: 2025-01-18

## 2025-01-18 RX ORDER — CHLORHEXIDINE GLUCONATE ORAL RINSE 1.2 MG/ML
15 SOLUTION DENTAL EVERY 12 HOURS SCHEDULED
Status: DISCONTINUED | OUTPATIENT
Start: 2025-01-18 | End: 2025-01-23 | Stop reason: HOSPADM

## 2025-01-18 RX ORDER — LACTULOSE 10 G/15ML
30 SOLUTION ORAL 4 TIMES DAILY
Status: CANCELLED | OUTPATIENT
Start: 2025-01-18

## 2025-01-18 RX ORDER — MIDAZOLAM HYDROCHLORIDE 2 MG/2ML
4 INJECTION, SOLUTION INTRAMUSCULAR; INTRAVENOUS ONCE
Status: COMPLETED | OUTPATIENT
Start: 2025-01-18 | End: 2025-01-18

## 2025-01-18 RX ORDER — CEFTRIAXONE 1 G/50ML
1000 INJECTION, SOLUTION INTRAVENOUS EVERY 24 HOURS
Status: DISCONTINUED | OUTPATIENT
Start: 2025-01-18 | End: 2025-01-18 | Stop reason: HOSPADM

## 2025-01-18 RX ORDER — LACTULOSE 10 G/15ML
20 SOLUTION ORAL 4 TIMES DAILY
Status: DISCONTINUED | OUTPATIENT
Start: 2025-01-18 | End: 2025-01-19

## 2025-01-18 RX ORDER — ETOMIDATE 2 MG/ML
40 INJECTION INTRAVENOUS ONCE
Status: COMPLETED | OUTPATIENT
Start: 2025-01-18 | End: 2025-01-18

## 2025-01-18 RX ORDER — LIDOCAINE 50 MG/G
1 PATCH TOPICAL DAILY
Status: CANCELLED | OUTPATIENT
Start: 2025-01-19

## 2025-01-18 RX ORDER — PROPOFOL 10 MG/ML
INJECTION, EMULSION INTRAVENOUS
Status: COMPLETED
Start: 2025-01-18 | End: 2025-01-18

## 2025-01-18 RX ORDER — MIDAZOLAM HYDROCHLORIDE 2 MG/2ML
INJECTION, SOLUTION INTRAMUSCULAR; INTRAVENOUS
Status: COMPLETED
Start: 2025-01-18 | End: 2025-01-18

## 2025-01-18 RX ORDER — CHLORHEXIDINE GLUCONATE ORAL RINSE 1.2 MG/ML
15 SOLUTION DENTAL EVERY 12 HOURS SCHEDULED
Status: DISCONTINUED | OUTPATIENT
Start: 2025-01-18 | End: 2025-01-18

## 2025-01-18 RX ORDER — SODIUM CHLORIDE 9 MG/ML
75 INJECTION, SOLUTION INTRAVENOUS CONTINUOUS
Status: CANCELLED | OUTPATIENT
Start: 2025-01-18

## 2025-01-18 RX ORDER — FENTANYL CITRATE 50 UG/ML
50 INJECTION, SOLUTION INTRAMUSCULAR; INTRAVENOUS EVERY 2 HOUR PRN
Refills: 0 | Status: DISCONTINUED | OUTPATIENT
Start: 2025-01-18 | End: 2025-01-18

## 2025-01-18 RX ADMIN — FENTANYL CITRATE 50 MCG: 50 INJECTION INTRAMUSCULAR; INTRAVENOUS at 16:15

## 2025-01-18 RX ADMIN — OCTREOTIDE ACETATE 50 MCG/HR: 500 INJECTION, SOLUTION INTRAVENOUS; SUBCUTANEOUS at 16:40

## 2025-01-18 RX ADMIN — PHYTONADIONE 10 MG: 10 INJECTION, EMULSION INTRAMUSCULAR; INTRAVENOUS; SUBCUTANEOUS at 11:37

## 2025-01-18 RX ADMIN — SODIUM CHLORIDE 1000 ML: 0.9 INJECTION, SOLUTION INTRAVENOUS at 16:15

## 2025-01-18 RX ADMIN — MIDAZOLAM HYDROCHLORIDE 4 MG: 1 INJECTION, SOLUTION INTRAMUSCULAR; INTRAVENOUS at 16:25

## 2025-01-18 RX ADMIN — PROPOFOL 50 MCG/KG/MIN: 10 INJECTION, EMULSION INTRAVENOUS at 15:30

## 2025-01-18 RX ADMIN — CHLORHEXIDINE GLUCONATE 0.12% ORAL RINSE 15 ML: 1.2 LIQUID ORAL at 20:47

## 2025-01-18 RX ADMIN — SODIUM CHLORIDE, SODIUM LACTATE, POTASSIUM CHLORIDE, AND CALCIUM CHLORIDE 125 ML/HR: .6; .31; .03; .02 INJECTION, SOLUTION INTRAVENOUS at 16:48

## 2025-01-18 RX ADMIN — POLYETHYLENE GLYCOL 3350, SODIUM SULFATE ANHYDROUS, SODIUM BICARBONATE, SODIUM CHLORIDE, POTASSIUM CHLORIDE 2000 ML: 236; 22.74; 6.74; 5.86; 2.97 POWDER, FOR SOLUTION ORAL at 18:19

## 2025-01-18 RX ADMIN — OSELTAMIVIR PHOSPHATE 75 MG: 75 CAPSULE ORAL at 18:19

## 2025-01-18 RX ADMIN — MIDAZOLAM HYDROCHLORIDE 4 MG: 2 INJECTION, SOLUTION INTRAMUSCULAR; INTRAVENOUS at 16:25

## 2025-01-18 RX ADMIN — LACTULOSE 20 G: 20 SOLUTION ORAL at 17:11

## 2025-01-18 RX ADMIN — SODIUM CHLORIDE 75 ML/HR: 0.9 INJECTION, SOLUTION INTRAVENOUS at 02:50

## 2025-01-18 RX ADMIN — OCTREOTIDE ACETATE 50 MCG/HR: 500 INJECTION, SOLUTION INTRAVENOUS; SUBCUTANEOUS at 10:28

## 2025-01-18 RX ADMIN — RIFAXIMIN 550 MG: 550 TABLET ORAL at 20:47

## 2025-01-18 RX ADMIN — IOHEXOL 100 ML: 350 INJECTION, SOLUTION INTRAVENOUS at 12:26

## 2025-01-18 RX ADMIN — SODIUM CHLORIDE 80 MG: 9 INJECTION, SOLUTION INTRAVENOUS at 12:34

## 2025-01-18 RX ADMIN — HYDROMORPHONE HYDROCHLORIDE 0.5 MG: 1 INJECTION, SOLUTION INTRAMUSCULAR; INTRAVENOUS; SUBCUTANEOUS at 21:03

## 2025-01-18 RX ADMIN — SODIUM CHLORIDE 8 MG/HR: 9 INJECTION, SOLUTION INTRAVENOUS at 09:55

## 2025-01-18 RX ADMIN — LACTULOSE 20 G: 20 SOLUTION ORAL at 21:40

## 2025-01-18 RX ADMIN — ETOMIDATE 40 MG: 2 INJECTION INTRAVENOUS at 15:30

## 2025-01-18 RX ADMIN — OCTREOTIDE ACETATE 50 MCG: 50 INJECTION, SOLUTION INTRAVENOUS; SUBCUTANEOUS at 16:32

## 2025-01-18 RX ADMIN — SODIUM CHLORIDE 500 ML: 0.9 INJECTION, SOLUTION INTRAVENOUS at 09:45

## 2025-01-18 NOTE — ASSESSMENT & PLAN NOTE
History of NAFLD/HYATT cirrhosis  Now complicated with hepatic encephalopathy and GI bleed.  Has had recurrent admissions for hepatic encephalopathy in the past  Hemoglobin dropped from 10-5.6 today.  Last EGD in March 2024 showed grade 1 esophageal varices  MELD 19  Currently on Protonix and octreotide infusion and IV ceftriaxone.  Transferred to [facility] for endoscopic intervention with concern for acute GI bleed

## 2025-01-18 NOTE — CONSULTS
Consultation - Gastroenterology   Name: Bailey Olsen 54 y.o. female I MRN: [id number]  Unit/Bed#: ICU 07 I Date of Admission: (Not on file)   Date of Service: 1/18/2025 I Hospital Day: 0       Inpatient consult to gastroenterology     Date/Time  1/18/2025 11:43 AM     Performed by  Cherise Portillo DO   Authorized by  Idalia Stuart MD           Physician Requesting Evaluation: Trupti Johns DO   Reason for Evaluation / Principal Problem: GI bleed, cirrhosis    Assessment & Plan  Rectal bleeding  54-year-old female presents as a transfer to [facility] for rectal bleeding in the setting of decompensated cirrhosis for which GI is consulted. Patient initially presented to from [facility] with AMS c/f HE. Today she was noted to have rectal bleeding with acute drop in hemoglobin from 10.4 on admission to 5.6. PLTs 108. INR 2.1. BUN normal. 1u PRBC ordered so far. EGD in March 2024 showed small nonbleeding EV. Colonoscopy at the same time showed 1 polyp and internal hemorrhoids. Not on AP/AC. Fortunately she remains hemodynamically stable. Given the significant decline in hemoglobin in the setting of her cirrhosis, there is concern for variceal bleed as the etiology of her GI bleeding. Must of course consider the possibility of bleeding from other upper source such as PUD, PHG, GAVE, angiodysplasia, dieulafoy, malignancy.  However, given her normal BUN and she does not appear to be having frequent large-volume hematochezia, a lower GI source certainly a possibility.  Per IR, she has had minimal esophageal varices on previous CT imaging in December but also has a notable large splenorenal shunt with vascular anomalies involving the splenic flexure and descending colon which could represent varices versus angiodysplasia. CT high volume bleeding scan negative for obvious active GI bleeding.     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 Per chart review, patient was previously following with hepatology in [place] ([facility]) but was lost to follow-up in early 2024.  We are unable to view records from this practice, and it does not appear she has established care with hepatology since then.  It is not clear what previous workup has been done regarding her cirrhosis, but the patient denies having a previous liver biopsy. Patient has had 3 recent hospitalizations in September, October, and December of 2024 for recurrent hepatic encephalopathy with falls.  She now presents again with initial concern for HE and has since been transferred to [facility] for rectal bleeding as mentioned above.  Please see below for further management of cirrhosis and it's complications.    MELD 3.0: 19 at 1/18/2025  8:58 AM  MELD-Na: 17 at 1/18/2025  8:58 AM    Etiology: Suspected to be from MASLD.  Unclear if patient has had full serologic workup in the past.  This can be obtained as an outpatient.    Transplant Status: Unclear if this has been formally discussed to date since she was last to follow-up from her previous hepatologist in [place].  She will need to establish care with hepatology and follow closely on discharge with discussion regarding transplant candidacy.  Ascites: Reportedly has never required paracentesis and she has never had clinically significant ascites however she has remained on spironolactone and torsemide for unclear reasons.  Most recent CT from  December 2024 showed small volume ascites  Hold diuretics for now  If patient were to undergo paracentesis, replete 6-8 g albumin for every liter removed if > 5 L removed  Hepatic Encephalopathy (Type C): [place] Grade 3. Unable to follow commands. Suspected precipitants include her splenorenal shunt evidenced on CT imaging in addition to her chronic opioid use and current GI bleeding.   Continue lactulose and rifaximin titrated to 3-4 BM/day  Diet: High protein/high calorie diet to prevent catabolic state w/ further ammonia production   Avoid narcotics and other sedating medications  Do not order or trend ammonia level   Varices: Last EGD March 2024 showed small nonbleeding EV. No prior history of known variceal bleeding. Please see above for details  Plan for EGD as above  NSBB: Not indicate at this time  Continue to monitor for signs of overt bleeding   Hepatoma screening: No suspicious masses seen on recent imaging.   Patient will require q.6-12month dedicated liver imaging for HCC monitoring  Acute anemia  As above   I have discussed the above management plan in detail with the primary service.     History of Present Illness   HPI:  Bailey Olsen is a 54 y.o. 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 for rectal bleeding in the setting of decompensated cirrhosis for which gastroenterology is consulted. Patient initially presented to from [facility] with AMS c/f HE. Today she was noted to have rectal bleeding with acute drop in hemoglobin from 10.4 on admission to 5.6. PLTs 108. INR 2.1. BUN normal. 1u PRBC ordered so far. EGD in March 2024 showed small nonbleeding EV. Colonoscopy at the same time showed 1 polyp and internal hemorrhoids. Not on AP/AC. Fortunately she remains hemodynamically stable since arrival and  her last BM was at 4 AM. Patient seen and evaluated at bedside upon arrival in ICU. Unable to obtain history as patient is encephalopathic.     Review of Systems Negative other than stated above  I have reviewed the patient's PMH, PSH, Social History, Family History, Meds, and Allergies    Objective :  Temp:  [97.3 °F (36.3 °C)-99.2 °F (37.3 °C)] 99 °F (37.2 °C)  HR:  [] 119  BP: ()/(41-78) 132/59  Resp:  [16-42] 42  SpO2:  [96 %-100 %] 99 %  O2 Device: None (Room air)    Physical Exam  General: No apparent distress, nontoxic   Head: Normocephalic, atraumatic  Eyes: Anicteric, no conjunctival erythema  ENT: External ear normal, no nasal discharge  Neck: Trachea midline, no visible lymphadenopathy   Respiratory:  Non-labored respirations, symmetric thorax expansion  Cardiovascular:  Extremities appear well-perfused  Abdomen: Non-distended  Extremities: Moves extremities spontaneously  Skin: No visible rashes or jaundice  Neuro: Disoriented    Cherise Portillo D.O.  Gastroenterology Fellow, PGY-4  [facility]

## 2025-01-18 NOTE — ASSESSMENT & PLAN NOTE
Reports compliance with lactulose TID per .  Patient takes her own medications  Of note, has been taking percocet q6h outpatient for chronic back pain.  Reviewed PDMP and last filled on 1/13(120 tablets)  On admission, opening eyes to verbal command and slow to respond   CT head normal   Ammonia 183on admission-->,94   reports no alcohol use   In review of encephalopathy and reliably able to tolerate oral medications, will give lactulose retention enema x1 followed by po lactulose 30mg every 4 -6hours   Had multiple bowel movements since admission on lactulose which were subsequently bloody.  Concern for ongoing GI bleed compounding encephalopathy.  See further management under GI bleed.  Started on ceftriaxone for SBP prophylaxis as well.

## 2025-01-18 NOTE — UTILIZATION REVIEW
Initial Clinical Review    Admission: Date/Time/Statement:   Admission Orders (From admission, onward)       Ordered        01/17/25 1051  INPATIENT ADMISSION  Once                          Orders Placed This Encounter   Procedures    INPATIENT ADMISSION     Standing Status:   Standing     Number of Occurrences:   1     Level of Care:   Med Surg [16]     Estimated length of stay:   More than 2 Midnights     Certification:   I certify that inpatient services are medically necessary for this patient for a duration of greater than two midnights. See H&P and MD Progress Notes for additional information about the patient's course of treatment.     ED Arrival Information       Expected   -    Arrival   1/17/2025 09:35    Acuity   Emergent              Means of arrival   Walk-In    Escorted by   Family Member    Service   Hospitalist    Admission type   Emergency              Arrival complaint   Confused             Chief Complaint   Patient presents with    Altered Mental Status     Pt has cirrhosis of liver and knows when Ammonia gets high, she states she is not feeling well. Pt was seen here for same a couple weeks ago and was admitted.        Initial Presentation: 54 y.o. female to ED from home w/ PMH of liver cirrhosis with recurrent hospitalization for hepatic encephalopathy, chronic back pain and continuous opioid dependence, morbid obesity, pancytopenia, confusion. Sx x1 day per  . Compliant w/ lactulose . Ammonia elevated on admission . + influenza B . + tachycardia and leukopenia . CXR neg for pneumonia . Admitted IP status w/ SIRS likely sec to hepatic encephalopathy . Plan to monitor off abx , cont IVF . Cirrhosis resume aldactone and torsemide in am , cont lactulose . Hypokalemia replete .     Anticipated Length of Stay/Certification Statement: Patient will be admitted on an inpatient basis with an anticipated length of stay of greater than 2 midnights secondary to management as documented above.      1/18 Quick Note   Rectal bleeding noted , heparin discontinued , trend H&H .     1/18 IR Consult   Priority 1 transfer is pending with a defined pickup time.  Requested CT high volume bleeding scan be completed promptly prior to pickup without delaying transfer, to evaluate possible colonic rather than upper GI source. If TIPS is required, patient will be at high risk of worsening encephalopathy; may need staged intervention to first decompress the portal system further to address bleeding and then embolize the shunt if needed, versus a retrograde transvenous obliteration of varices. Plan to transfer from [facility] to [facility] for higher level of care . Likely starting w/ upper endoscopy . Suspected variceal UGIB .    Date: 1/18   Day 2:  Hb drop from 10.4>5.9>5.6  Concern for upper GI bleed. Started on Protonix infusion at 8 mg an hour after bolus of 80 mg x 1 dose  Start octreotide infusion.  Will transfuse 2 unit of packed RBCs and 2 units of FFP. Give vitamin K 10 mg IV x 1 dose. Continue with IV ceftriaxone for SBP prophylaxistransferred to [facility] for urgent endoscopy/intervention.  Upgraded to stepdown 2 level of care.  Will be transferred to critical care service at [facility].  Keep NPO.  Maintain 2 large-bore IV access.  Midline if able.    ED Treatment-Medication Administration from 01/17/2025 0935 to 01/17/2025 1131         Date/Time Order Dose Route Action     01/17/2025 1057 lactulose (CHRONULAC) oral solution 20 g 20 g Oral Given            Scheduled Medications:  carvedilol, 3.125 mg, Oral, BID With Meals  cefTRIAXone, 1,000 mg, Intravenous, Q24H  lactulose, 30 g, Oral, 4x daily  lactulose, 200 g, Rectal, Once  lidocaine, 1 patch, Topical, Daily  pantoprazole, 80 mg, Intravenous, Once  rifaximin, 550 mg, Oral, Q12H MIGUEL  sodium chloride, 500 mL, Intravenous, Once      Continuous IV Infusions:  octreotide, 50 mcg/hr, Intravenous, Continuous  pantoprazole (PROTONIX) 80 mg in sodium chloride 0.9 % 100 mL infusion, 8  mg/hr, Intravenous, Continuous  sodium chloride, 75 mL/hr, Intravenous, Continuous      PRN Meds:  cyclobenzaprine, 10 mg, Oral, TID PRN  ondansetron, 4 mg, Intravenous, Q6H PRN      ED Triage Vitals [01/17/25 0944]   Temperature Pulse Respirations Blood Pressure SpO2 Pain Score   (!) 97.4 °F (36.3 °C) 96 18 144/69 95 % 10 - Worst Possible Pain     Weight (last 2 days)       Date/Time Weight    01/17/25 11:43:59 113 (250)    01/17/25 0944 125 (274.91)            Vital Signs (last 3 days)       Date/Time Temp Pulse Resp BP MAP (mmHg) SpO2 O2 Device Patient Position - Orthostatic VS [place] Coma Scale Score Pain    01/18/25 0820 -- -- -- -- -- 99 % None (Room air) -- 12 4    01/18/25 07:57:03 97.3 °F (36.3 °C) 112 17 109/60 76 99 % -- -- -- --    01/18/25 0559 -- -- -- -- -- -- -- -- 14 --    01/18/25 0549 -- -- -- 92/44 -- -- -- Lying -- --    01/18/25 05:45:13 -- 103 -- 95/41 59 100 % -- -- -- --    01/18/25 04:22:33 -- 96 -- 98/57 71 100 % -- -- -- --    01/18/25 0159 -- -- -- -- -- -- -- -- 14 --    01/18/25 00:29:03 -- 83 19 111/68 82 100 % -- -- -- --    01/17/25 23:20:23 97.5 °F (36.4 °C) -- 18 85/42 56 -- -- -- -- --    01/17/25 2159 -- -- -- -- -- -- -- -- 14 --    01/17/25 20:58:14 97.5 °F (36.4 °C) 89 -- 114/62 79 96 % -- -- -- --    01/17/25 2000 -- -- -- -- -- -- None (Room air) -- 14 No Pain    01/17/25 1759 -- -- -- -- -- -- -- -- 14 --    01/17/25 1757 -- 95 -- 147/78 -- -- -- -- -- --    01/17/25 15:32:21 97.7 °F (36.5 °C) 112 18 133/74 94 98 % -- -- -- --    01/17/25 15:30:11 97.7 °F (36.5 °C) 106 18 133/74 94 96 % -- -- -- --    01/17/25 1408 -- -- -- -- -- -- -- -- 14 --    01/17/25 11:43:59 97.7 °F (36.5 °C) 86 16 147/69 95 97 % -- -- -- --    01/17/25 1142 -- -- -- -- -- 97 % -- -- 14 No Pain    01/17/25 1100 -- 94 20 128/61 87 97 % None (Room air) Lying -- --    01/17/25 1045 -- 85 14 122/58 84 95 % None (Room air) Lying -- --    01/17/25 1030 -- 85 14 122/57 85 95 % None (Room air) Lying -- --     01/17/25 1029 -- -- -- -- -- -- None (Room air) -- -- --    01/17/25 1027 -- -- -- -- -- -- -- -- 14 10 - Worst Possible Pain    01/17/25 0944 97.4 °F (36.3 °C) 96 18 144/69 -- 95 % None (Room air) Lying -- 10 - Worst Possible Pain              Pertinent Labs/Diagnostic Test Results:   Radiology:  No orders to display     Cardiology:  No orders to display     GI:  No orders to display           Results from last 7 days   Lab Units 01/18/25  0855 01/17/25  0954 01/16/25  1231   WBC Thousand/uL 7.23 3.27* 3.36*   HEMOGLOBIN g/dL 5.9* 10.4* 10.2*   HEMATOCRIT % 18.5* 32.7* 31.7*   PLATELETS Thousands/uL 108* 70* 76*   TOTAL NEUT ABS Thousands/µL  --  2.25  --          Results from last 7 days   Lab Units 01/18/25 0442 01/17/25  0954 01/16/25  1231   SODIUM mmol/L 142 138 141   POTASSIUM mmol/L 4.2 3.3* 3.4*   CHLORIDE mmol/L 111* 100 102   CO2 mmol/L 27 33* 37*   ANION GAP mmol/L 4 5 2*   BUN mg/dL 14 13 10   CREATININE mg/dL 0.70 0.86 0.88   EGFR ml/min/1.73sq m 97 76 74   CALCIUM mg/dL 7.8* 8.6 8.6   MAGNESIUM mg/dL 1.8*  --   --      Results from last 7 days   Lab Units 01/18/25 0442 01/17/25  0954   AST U/L 31 33   ALT U/L 12 16   ALK PHOS U/L 132* 196*   TOTAL PROTEIN g/dL 4.6* 7.6   ALBUMIN g/dL 2.1* 3.3*   TOTAL BILIRUBIN mg/dL 1.70* 1.47*   AMMONIA umol/L 94* 183*     Results from last 7 days   Lab Units 01/17/25  1434   POC GLUCOSE mg/dl 102     Results from last 7 days   Lab Units 01/18/25  0442 01/17/25  0954 01/16/25  1231   GLUCOSE RANDOM mg/dL 140 141* 107       Results from last 7 days   Lab Units 01/17/25  0957   PROTIME seconds 17.6*   INR  1.41*   PTT seconds 35*         Results from last 7 days   Lab Units 01/18/25  0858 01/18/25  0704 01/17/25  1251 01/17/25  0954   LACTIC ACID mmol/L 3.5* 3.6* 2.5* 2.2*         Results from last 7 days   Lab Units 01/16/25  1231   BNP pg/mL 8       Results from last 7 days   Lab Units 01/17/25  0954   LIPASE u/L 7*     Results from last 7 days   Lab Units  01/17/25  1157   CLARITY UA  Clear   COLOR UA  Yellow   SPEC GRAV UA  <=1.005   PH UA  7.0   GLUCOSE UA mg/dl Negative   KETONES UA mg/dl Negative   BLOOD UA  Trace-Intact*   PROTEIN UA mg/dl Negative   NITRITE UA  Negative   BILIRUBIN UA  Negative   UROBILINOGEN UA E.U./dl 1.0   LEUKOCYTES UA  Negative   WBC UA /hpf 0-1   RBC UA /hpf 0-1   BACTERIA UA /hpf Occasional   EPITHELIAL CELLS WET PREP /hpf Occasional       Results from last 7 days   Lab Units 01/17/25  1157   AMPH/METH  Negative   BARBITURATE UR  Negative   BENZODIAZEPINE UR  Negative   COCAINE UR  Negative   METHADONE URINE  Negative   OPIATE UR  Positive*   PCP UR  Negative   THC UR  Negative     Results from last 7 days   Lab Units 01/17/25  0954   ETHANOL LVL mg/dL <10       Past Medical History:   Diagnosis Date    Back pain     Carcinoid tumor     neuroendocrine    CHF (congestive heart failure) (HCC)     Cirrhosis of liver (HCC)     Sarcoidosis      Present on Admission:   Hepatic encephalopathy (HCC)   Other cirrhosis of liver (HCC)   Influenza B   Hypokalemia   Intractable back pain      Admitting Diagnosis: Hepatic encephalopathy (HCC) [K76.82]  Altered mental status [R41.82]  Influenza [J11.1]  Age/Sex: 54 y.o. female    Network Utilization Review Department  ATTENTION: Please call with any questions or concerns to [phone] and carefully listen to the prompts so that you are directed to the right person. All voicemails are confidential.   For Discharge needs, contact Care Management DC Support Team at [phone] opt. [phone]  Send all requests for admission clinical reviews, approved or denied determinations and any other requests to dedicated fax number below belonging to the campus where the patient is receiving treatment. List of dedicated fax numbers for the Facilities:  FACILITY NAME UR FAX NUMBER   ADMISSION DENIALS (Administrative/Medical Necessity) [phone]   DISCHARGE SUPPORT TEAM (NETWORK) [phone]   PARENT CHILD HEALTH  (Maternity/NICU/Pediatrics) [phone]   [facility] [phone]   [facility] [phone]   [facility] [phone]   [facility] [phone]   [facility] [phone]   [facility] [phone]   [facility] [phone]   [facility] [phone]   [facility] [phone]   [facility] [phone]   [facility] [phone]   [facility] [phone]

## 2025-01-18 NOTE — ASSESSMENT & PLAN NOTE
1/17 Flu B positive   Respiratory precautions  Tamiflu 75mg Q 12 hours x 5 days start 1/18 end 1/23

## 2025-01-18 NOTE — ASSESSMENT & PLAN NOTE
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 Per chart review, patient was previously following with hepatology in [place] ([facility]) but was lost to follow-up in early 2024.  We are unable to view records from this practice, and it does not appear she has established care with hepatology since then.  It is not clear what previous workup has been done regarding her cirrhosis, but the patient denies having a previous liver biopsy. Patient has had 3 recent hospitalizations in September, October, and December of 2024 for recurrent hepatic encephalopathy with falls.  She now presents again with initial concern for HE and has since been transferred to [facility] for rectal bleeding as mentioned above.  Please see below for further management of cirrhosis and it's complications.    MELD 3.0: 19 at 1/18/2025  8:58 AM  MELD-Na: 17 at 1/18/2025  8:58 AM    Etiology: Suspected to be from MASLD.  Unclear if patient has had full serologic workup in the past.  This can be obtained as an outpatient.    Transplant Status: Unclear if this has been formally discussed to date since she was last to follow-up from her previous hepatologist in [place].  She will need to establish care with hepatology and follow closely on discharge with discussion regarding transplant candidacy.  Ascites: Reportedly has never required paracentesis and she has never had clinically significant ascites however she has remained on spironolactone and torsemide for unclear reasons.  Most recent CT from December 2024 showed small volume ascites  Hold diuretics for now  If patient were to undergo paracentesis, replete 6-8 g albumin for every liter removed if > 5 L removed  Hepatic Encephalopathy (Type C): [place] Grade 3. Unable to follow commands. Suspected precipitants include her splenorenal shunt evidenced on CT imaging in addition to her chronic opioid use and current GI bleeding.    Continue lactulose and rifaximin titrated to 3-4 BM/day  Diet: High protein/high calorie diet to prevent catabolic state w/ further ammonia production   Avoid narcotics and other sedating medications  Do not order or trend ammonia level   Varices: Last EGD March 2024 showed small nonbleeding EV. No prior history of known variceal bleeding. Please see above for details  Plan for EGD as above  NSBB: Not indicate at this time  Continue to monitor for signs of overt bleeding   Hepatoma screening: No suspicious masses seen on recent imaging.   Patient will require q.6-12month dedicated liver imaging for HCC monitoring

## 2025-01-18 NOTE — NURSING NOTE
Discharged patient via stretcher and transport team. Report given to Javi at [facility].

## 2025-01-18 NOTE — ASSESSMENT & PLAN NOTE
Hemoglobin dropped from 10.4-5.6 within 24 hours  Concern for GI bleed  Transfuse 2 unit of packed RBCs  Continue with H&H Q6 hourly  Upgraded to stepdown 2  Transfer to [facility] for endoscopic intervention

## 2025-01-18 NOTE — ASSESSMENT & PLAN NOTE
Loose stools with multiple bloody BM noted overnight  Tachycardiac , currently normotensive. Remains Encephalopathic  Hb drop from 10.4>5.9>5.6  Concern for upper GI bleed  Started on Protonix infusion at 8 mg an hour after bolus of 80 mg x 1 dose  Start octreotide infusion  Continue with IV ceftriaxone for SBP prophylaxis  Consent obtained from  for transfusion  Will transfuse 2 unit of packed RBCs and 2 units of FFP  Give vitamin K 10 mg IV x 1 dose  Discussed with GI on-call( Dr. Thompson) recommended urgent transfer  Discussed with PACS and critical care, GI, IR team at [facility].  Patient will be transferred to [facility] for urgent endoscopy/intervention.  Upgraded to stepdown 2 level of care.  Will be transferred to critical care service at [facility].  Keep NPO.  Maintain 2 large-bore IV access.  Midline if able.

## 2025-01-18 NOTE — DISCHARGE SUMMARY
Discharge Summary - Hospitalist   Name: Bailey Olsen 54 y.o. female I MRN: [id number]  Unit/Bed#: -01 I Date of Admission: 1/17/2025   Date of Service: 1/18/2025 I Hospital Day: 1     Assessment & Plan  Upper GI bleed  Loose stools with multiple bloody BM noted overnight  Tachycardiac , currently normotensive. Remains Encephalopathic  Hb drop from 10.4>5.9>5.6  Concern for upper GI bleed  Started on Protonix infusion at 8 mg an hour after bolus of 80 mg x 1 dose  Start octreotide infusion  Continue with IV ceftriaxone for SBP prophylaxis  Consent obtained from  for transfusion  Will transfuse 2 unit of packed RBCs and 2 units of FFP  Give vitamin K 10 mg IV x 1 dose  Discussed with GI on-call( Dr. Thompson) recommended urgent transfer  Discussed with PACS and critical care, GI, IR team at [facility].  Patient will be transferred to [facility] for urgent endoscopy/intervention.  Upgraded to stepdown 2 level of care.  Will be transferred to critical care service at [facility].  Keep NPO.  Maintain 2 large-bore IV access.  Midline if able.    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  Has had recurrent admissions for hepatic encephalopathy in the past  Hemoglobin dropped from 10-5.6 today.  Last EGD in March 2024 showed grade 1 esophageal varices  MELD 19  Currently on Protonix and octreotide infusion and IV ceftriaxone.  Transferred to [facility] for endoscopic intervention with concern for acute GI bleed  Hypokalemia  Replace.  Potassium has normalized.  Hepatic encephalopathy (HCC)  Reports compliance with lactulose TID per .  Patient takes her own medications  Of note, has been taking percocet q6h outpatient for chronic back pain.  Reviewed PDMP and last filled on 1/13(120 tablets)  On admission, opening eyes to verbal command and slow to respond   CT head normal   Ammonia 183on  admission-->,94   reports no alcohol use   In review of encephalopathy and reliably able to tolerate oral medications, will give lactulose retention enema x1 followed by po lactulose 30mg every 4 -6hours   Had multiple bowel movements since admission on lactulose which were subsequently bloody.  Concern for ongoing GI bleed compounding encephalopathy.  See further management under GI bleed.  Started on ceftriaxone for SBP prophylaxis as well.  Intractable back pain  On chronic Percocet.  Was prescribed 120 tablets on 1/13.  Needs to be weaned off narcotics as this is likely contributing to recurrent hospitalization with hepatic encephalopathy as well.  Will need close outpatient follow-up with pain management upon discharge.  Morbid obesity with BMI of 40.0-44.9, adult (HCC)  Lifestyle modification.  Influenza B  Tested positive during this admission.  Recent hospitalization on 12/26 with influenza B as well.  Supportive care only at this time.  Acute metabolic encephalopathy  Multifactorial secondary to hepatic encephalopathy, GI bleed and possible effect of narcotics.  SIRS (systemic inflammatory response syndrome) (HCC)  Present admission as evidenced by tachycardia and leukopenia.  No source of infection identified.  Chest x-ray negative for pneumonia.  UA unremarkable.  Likely secondary to hepatic encephalopathy.  No evidence of SBP.  Empirically on IV ceftriaxone   Does have worsening lactic acidosis.   Likely in the setting of GI bleed.  Acute blood loss anemia  Hemoglobin dropped from 10.4-5.6 within 24 hours  Concern for GI bleed  Transfuse 2 unit of packed RBCs  Continue with H&H Q6 hourly  Upgraded to stepdown 2  Transfer to [facility] for endoscopic intervention     Medical Problems       Resolved Problems  Date Reviewed: 10/6/2024   None         MESSAGE TO PCP (Nayely Brown DO) FOR FOLLOW UP:   Thank you for allowing us to participate in the care of your patient, Bailey Olsen, who was  hospitalized from 1/17/2025 through 01/18/25 with the admitting diagnosis of .  Hepatic encephalopathy with underlying history of Hyatt cirrhosis.  Had worsening hemoglobin and concern for GI bleed and transferred to [facility] for further endoscopic intervention.    MeIf you have any additional questions or would like to discuss further, please feel free to contact me.  Mar Oliva MD  [facility] Internal Medicine, Hospitalist, [phone]     Admission Date:   Admission Orders (From admission, onward)       Ordered        01/17/25 1051  INPATIENT ADMISSION  Once                          Discharge Date: 01/18/25    Consultations During Hospital Stay:  GI    Procedures Performed:   CTA abdomen high-volume bleeding scan    Significant Findings / Test Results:   Hb 5.6    Incidental Findings:   NA       Test Results Pending at Discharge (will require follow up):   CTA     Complications:  None     Reason for Admission: Confusion    Hospital Course:   Bailey Olsen is a 54 y.o. female patient who originally presented to the hospital on 1/17/2025 due to worsening confusion and concern for hepatic encephalopathy in the setting of underlying HYATT cirrhosis and elevated ammonia.  Started treatment with rifaximin and lactulose 30 g every 6 hourly.  Had multiple bowel movements and subsequent bloody bowel movements overnight with hemoglobin drop from 10.4-5.9 and subsequently 5.6.  MELD score was 19 and elevated INR.  Patient started having tachycardia although remains normotensive.  Concern for upper GI bleed, ongoing hepatic encephalopathy.  Patient was started on Protonix and octreotide infusion.  Packed RBC and FFP's ordered and transfused.  Discussed with critical care team, GI, IR and SLP and patient is being transferred there for urgent endoscopic intervention.   updated at bedside regarding plan.          Please see above list of diagnoses and related plan for additional information.     Condition at Discharge:  "critical    Discharge Day Visit / Exam:   Subjective: Seen and examined at bedside.  Opens eyes to verbal stimulus otherwise remains confused restless and agitated.  Events of last night noted.  Vitals: Blood Pressure: 132/59 (01/18/25 1119)  Pulse: (!) 118 (01/18/25 1201)  Temperature: 99.5 °F (37.5 °C) (01/18/25 1201)  Temp Source: Temporal (01/18/25 1104)  Respirations: (!) 23 (01/18/25 1201)  Height: 5' 5\" (165.1 cm) (01/17/25 1143)  Weight - Scale: 113 kg (250 lb) (01/17/25 1143)  SpO2: 99 % (01/18/25 1201)  Physical Exam  Constitutional:       Appearance: She is obese. She is ill-appearing.   HENT:      Mouth/Throat:      Mouth: Mucous membranes are dry.      Comments: Fruity odor.  Eyes:      Pupils: Pupils are equal, round, and reactive to light.   Cardiovascular:      Rate and Rhythm: Regular rhythm. Tachycardia present.   Pulmonary:      Comments: Poor respiratory effort and diminished breath sounds at bases  Abdominal:      General: Bowel sounds are normal.      Palpations: Abdomen is soft.      Tenderness: There is no abdominal tenderness. There is no guarding.   Genitourinary:     Comments: Dried up melanotic stool noted on genitalia  Musculoskeletal:      Comments: Positive asterixis   Skin:     Coloration: Skin is pale.   Neurological:      Comments: Disoriented.  Confused, agitated.  GCS 11(4/2/5)          Discussion with Family: Updated  () at bedside.    Discharge instructions/Information to patient and family:   See after visit summary for information provided to patient and family.      Provisions for Follow-Up Care:  See after visit summary for information related to follow-up care and any pertinent home health orders.      Mobility at time of Discharge:   Basic Mobility Inpatient Raw Score: 13  JH-HLM Goal: 4: Move to chair/commode  JH-HLM Achieved: 2: Bed activities/Dependent transfer  HLM Goal achieved. Continue to encourage appropriate mobility.     Disposition:   Acute " [facility] Transfer to [facility]    Planned Readmission: NO    Discharge Medications:  See after visit summary for reconciled discharge medications provided to patient and/or family.      Administrative Statements   Discharge Statement:  I have spent a total time of >30 minutes in caring for this patient on the day of the visit/encounter. >30 minutes of time was spent on: Diagnostic results, Prognosis, Risks and benefits of tx options, Instructions for management, Patient and family education, Importance of tx compliance, Risk factor reductions, Impressions, Counseling / Coordination of care, Documenting in the medical record, Reviewing / ordering tests, medicine, procedures  , and Communicating with other healthcare professionals .    **Please Note: This note may have been constructed using a voice recognition system**

## 2025-01-18 NOTE — PLAN OF CARE
Problem: Potential for Falls  Goal: Patient will remain free of falls  Description: INTERVENTIONS:  - Educate patient/family on patient safety including physical limitations  - Instruct patient to call for assistance with activity   - Consult OT/PT to assist with strengthening/mobility   - Keep Call bell within reach  - Keep bed low and locked with side rails adjusted as appropriate  - Keep care items and personal belongings within reach  - Initiate and maintain comfort rounds  - Make Fall Risk Sign visible to staff  - Offer Toileting every 2 Hours, in advance of need  - Initiate/Maintain bed/chair alarm  - Obtain necessary fall risk management equipment: bed/chair alarm, bedside commode  - Apply yellow socks and bracelet for high fall risk patients  - Consider moving patient to room near nurses station  Outcome: Progressing     Problem: SAFETY ADULT  Goal: Patient will remain free of falls  Description: INTERVENTIONS:  - Educate patient/family on patient safety including physical limitations  - Instruct patient to call for assistance with activity   - Consult OT/PT to assist with strengthening/mobility   - Keep Call bell within reach  - Keep bed low and locked with side rails adjusted as appropriate  - Keep care items and personal belongings within reach  - Initiate and maintain comfort rounds  - Make Fall Risk Sign visible to staff  - Offer Toileting every 2 Hours, in advance of need  - Initiate/Maintain bed/chair alarm  - Obtain necessary fall risk management equipment: bed/chair alarm, bedside commode  - Apply yellow socks and bracelet for high fall risk patients  - Consider moving patient to room near nurses station  Outcome: Progressing  Goal: Maintain or return to baseline ADL function  Description: INTERVENTIONS:  -  Assess patient's ability to carry out ADLs; assess patient's baseline for ADL function and identify physical deficits which impact ability to perform ADLs (bathing, care of mouth/teeth,  toileting, grooming, dressing, etc.)  - Assess/evaluate cause of self-care deficits   - Assess range of motion  - Assess patient's mobility; develop plan if impaired  - Assess patient's need for assistive devices and provide as appropriate  - Encourage maximum independence but intervene and supervise when necessary  - Involve family in performance of ADLs  - Assess for home care needs following discharge   - Consider OT consult to assist with ADL evaluation and planning for discharge  - Provide patient education as appropriate  Outcome: Progressing  Goal: Maintains/Returns to pre admission functional level  Description: INTERVENTIONS:  - Perform AM-PAC 6 Click Basic Mobility/ Daily Activity assessment daily.  - Set and communicate daily mobility goal to care team and patient/family/caregiver.   - Collaborate with rehabilitation services on mobility goals if consulted  - Out of bed for toileting  - Record patient progress and toleration of activity level   Outcome: Progressing

## 2025-01-18 NOTE — H&P
History & Physical Exam - Critical Care   Bailey Olsen 54 y.o. female MRN: [id number]  Unit/Bed#: ICU 07 Encounter: [id number]      Assessment/Plan:  GI bleed-likely lower given melanotic stools, BUN normal; consider bleeding from splenorenal shunt  Acute blood loss anemia  Decompensated HYATT cirrhosis with hepatic encephalopathy and GI bleed  Acute hepatic encephalopathy, recurrent  SIRS  Intractable back pain  L2 compression fracture  Morbid obesity with a BMI of 41.60  Influenza B  Hx of neuroendocrine cancer of small bowel in 2014  Chronic pain on Hydroxychloroquine  Depression and anxiety    The patient is critically ill with acute GI bleed in setting of hepatic encephalopathy from decompensated HYATT and opiate use for back pain.  Neuro: Start sedation once intubated; plan to wean off once procedure is complete; O/P Elavil, Flexeril, Sertraline, Gabapentin on hold while lethargic; cont. O/P Hydroxychloroquine  CV: Cont. O/P Spironolactone and Torsemide, does not seem volume overloaded  Resp: Vent support for EGD and then extubate if parameters acceptable afterwards  GI: PPI drip, Octreotide drip, planned for EGD at bedside today and possible colonoscopy; continue lactulose and rifaximin, goal 3-4 formed bowel movements daily; IR consulted, given recurrent encephalopathy, may need to consider   TIPS to divert flow away plus/minus embolization of the shunt versus BRTO/PARTO/CARTO very tortuous, defer to GI  FEN: replete PRN  : diuretics per home regimen as above  Heme: not on DVT px due to acute GI bleed; transfuse for hemoglobin > 7g/dL  ID: Ceftriaxone for SBP Ppx day 1/7  Endo: no acute issues  MSK: reposition, turn  Lines:   Peripherals  No syl  Fiore - keep until medically optimized  Full code    Discussed on rounds.  Dicussed extensively with Benjamín Amaro, Danny Portillo, and Pj.    Overall prognosis seems poor given end stage liver disease with recurrent admissions for encephalopathy  and not listed for liver transplant. At high risk of death and decompensation.    Critical Care Time: 90 minutes  Documented critical care time excludes any procedures documented elsewhere. It also excludes any family updates    _____________________________________________________________________    Chief complaint:  Unable to obtain due to encephaopathy    HPI:    Bailey Olsen is a 54 y.o. female past medical history morbid obesity, L2 fracture with back pain, sarcoidosis of skin, carcinoid tumor in 2014, nonalcoholic fatty liver disease, anxiety and depression, neuropathy who presented to the ER in January 17 with change in mental status for the past 2-3 days.  Also with lethargy and abdominal pain.  She was found to have a lactate of 2.5, a potassium of 3.3, and elevated alkaline phosphatase, positive influenza B, and INR of 1.4, pancytopenia.  She was admitted for hepatic encephalopathy and given Lactulose retention enema. This AM, pt had multiple bowel movements after enema including several bloody bowel movements and hemoglobin dropped to 5.  She developed tachycardia.  She was started on Protonix and octreotide drips.  She underwent a high-volume bleeding scan that was negative.  She was transferred here for GI evaluation with IR availability in case TIPS is needed.    Found lying in bed, mumbling and not interactive.    The patient had multiple admissions in 2024.  She was admitted to [facility] in February 2024 with hepatic encephalopathy.  She was admitted in May 2024 with hepatic encephalopathy.  She was admitted again in May 2024 with acute on chronic heart failure requiring aggressive diuresis.  She was admitted in June 2024 with hepatic encephalopathy.  She was admitted in July 2024 with acute on chronic heart failure, hyperammonia anemia.  She was admitted again in July 2024 with intractable back pain.  She was admitted for third time in July 2024 with ambulatory dysfunction and intractable back  pain.  She was admitted in September 2024 after a fall from standing and found to have hepatic encephalopathy.  She was admitted in October 2024 with hepatic encephalopathy and KISHORE.  She was admitted in November 2024 with hepatic encephalopathy due to noncompliance.  She was admitted December 26, 2024 to December 28, 2024 at [facility] with hepatic encephalopathy and found to have Influenza B.    She saw neurosurgery for her L1 compression fracture following a fall in July and is recommended for an outpatient kyphoplasty.    Review of Systems:  Unable to obtain due to encephalopathy    Historical Information   Past Medical History:   Diagnosis Date    Back pain     Carcinoid tumor     neuroendocrine    CHF (congestive heart failure) (HCC)     Cirrhosis of liver (HCC)     Sarcoidosis      Past Surgical History:   Procedure Laterality Date    CHOLECYSTECTOMY      GASTRIC BYPASS      HERNIA REPAIR      IR KYPHOPLASTY/VERTEBROPLASTY  10/15/2024     Social History   Social History     Substance and Sexual Activity   Alcohol Use Never     Social History     Substance and Sexual Activity   Drug Use Never     Social History     Tobacco Use   Smoking Status Never   Smokeless Tobacco Never       Family History:   Family History   Problem Relation Age of Onset    Hypertension Father        Medications:  Pertinent medications were reviewed  Current Facility-Administered Medications   Medication Dose Route Frequency Provider Last Rate    etomidate  40 mg Intravenous Once EdSONAM DanielNP      fentaNYL  50 mcg Intravenous Q2H PRN EdSONAM DanielNP      lactated ringers  125 mL/hr Intravenous Continuous Zarian Prenatt, DO      midazolam           midazolam           midazolam  4 mg Intravenous Once Edward SONAM GarciaNP      propofol           propofol  5-50 mcg/kg/min Intravenous Titrated Edward SONAM GarciaNP           Allergies   Allergen Reactions    Zolpidem GI Intolerance, Other (See Comments) and Hives     Other  Reaction(s): Other      Blacks out    Blacks out    Tapentadol Sneezing and Other (See Comments)     Zoned out    Alprazolam Other (See Comments)     Other Reaction(s): Other      Feels zoned out    Feels zoned out    Amoxicillin-Pot Clavulanate Hives and Nausea Only     Amoxil fine    Dust Mite Extract Sneezing    Morphine Headache, Nausea Only and Vomiting    Tramadol GI Intolerance    Azelastine Rash    Azelastine Hcl Rash    Molds & Smuts Rash     Other Reaction(s): Unknown         Vitals:   /68 (BP Location: Left arm)   Pulse (!) 121   Temp 100 °F (37.8 °C) (Bladder)   Resp (!) 26   SpO2 98%   There is no height or weight on file to calculate BMI.  SpO2: 98 %,   SpO2 Activity: At Rest,   O2 Device: None (Room air)    No intake or output data in the 24 hours ending 01/18/25 1522  Invasive Devices       Peripheral Intravenous Line  Duration             Peripheral IV 01/17/25 Right Antecubital 1 day    Peripheral IV 01/18/25 Left;Ventral (anterior) Forearm <1 day              Drain  Duration             Urethral Catheter Temperature probe <1 day              Airway  Duration             ETT  Cuffed 7.5 mm <1 day              Long-dwell Peripherally Inserted Midline IV Catheter  Duration             Long-Dwell Peripheral IV (Midline) 01/18/25 Right Brachial <1 day                    Physical Exam:  GEN: WDWN, nad, comfortable  HEENT: NCAT, EOMI  CVS: tachycardic  LUNGS: diminished b/l bs at bases  ABD: soft, obse  NEURO: confused, not interactive  MS: Moving all extremities  PSYCH: unable to assess due to confusion    Diagnostic Data:  Lab: I have personally reviewed pertinent lab results.,   CBC:  Results from last 7 days   Lab Units 01/18/25  0953 01/18/25  0855   WBC Thousand/uL  --  7.23   HEMOGLOBIN g/dL 5.6* 5.9*   HEMATOCRIT % 16.5* 18.5*   PLATELETS Thousands/uL  --  108*      CMP:   Lab Results   Component Value Date    SODIUM 142 01/18/2025    K 4.2 01/18/2025     (H) 01/18/2025    CO2 27  "01/18/2025    BUN 14 01/18/2025    CREATININE 0.70 01/18/2025    CALCIUM 7.8 (L) 01/18/2025    AST 31 01/18/2025    ALT 12 01/18/2025    ALKPHOS 132 (H) 01/18/2025    EGFR 97 01/18/2025   ,   PT/INR:   Lab Results   Component Value Date    INR 2.19 (H) 01/18/2025     Labs per my review reveal normal renal function, low albumin, low protein, low alkaline phosphatase, hypomagnesemia, acute anemia with a hemoglobin of 5.6, thrombocytopenia improved    ABG: September 2024 shows chronic hypercapnia    Microbiology:  1/17/2025: Influenza B positive  12/26/2024: Influenza B positive    Imaging: I have personally reviewed the pertinent imaging studies on the PACS system  Chest x-ray per my review shows clear bilateral lungs    Cardiac/EKG/telemetry/Echo:       Echo 10/14/2024: EF 60%      VTE Prophylaxis: VTE covered by:    None       Code Status: Prior Trupti Johns DO    Portions of the record may have been created with voice recognition software. Occasional wrong word or \"sound a like\" substitutions may have occurred due to the inherent limitations of voice recognition software. Read the chart carefully and recognize, using context, where substitutions have occurred.   "

## 2025-01-18 NOTE — CONSULTS
e-Consult (IPC)  - Interventional Radiology  Bailey Olsen 54 y.o. female MRN: [id number]  Unit/Bed#: -01 Encounter: [id number]          Interventional Radiology has been consulted to evaluate Bailey Olsen    We were consulted by ANDREINA concerning this patient with history of cirrhosis, recurrent admissions for hepatic encephalopathy, admitted for altered mental status and now with rectal bleeding.    Hemoglobin is 5.6 from 10.2, actively being resuscitated, but without tachycardia or hypotension.    Plan is to transfer from [facility] to [facility] for higher level of care, likely starting with upper endoscopy.  I was consulted for possible TIPS as a backup option for suspected variceal UGIB.    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  There are vascular anomalies seemingly involving the splenic flexure/descending colon; given phase of contrast on multiple priors it is uncertain whether these are varices in the wall or angiodysplasia; small near-adjacent branches of the SMA and portal vein both course in this area.    Consults  01/18/25    Assessment/Recommendation:     - Priority 1 transfer is pending with a defined pickup time.  - Requested CT high volume bleeding scan be completed promptly prior to pickup without delaying transfer, to evaluate possible colonic rather than upper GI source.    If TIPS is required, patient will be at high risk of worsening encephalopathy.  Options would be immediate TIPS/variceal embolization, less likely staged intervention to first decompress the portal system further to address bleeding and then embolize the shunt if needed.  Given anatomy of a splenic vein to left gonadal vein shunt, retrograde transvenous obliteration of varices is not technically feasible.    Note is made of high MELD today  with elevated risk of liver failure/mortality after TIPS.  Currently unclear whether she has been evaluated for liver transplant candidacy.    MELD 3.0: 19 at 1/18/2025  8:58 AM  MELD-Na: 17 at 1/18/2025  8:58 AM  Calculated from:  Serum Creatinine: 0.7 mg/dL (Using min of 1 mg/dL) at 1/18/2025  4:42 AM  Serum Sodium: 142 mmol/L (Using max of 137 mmol/L) at 1/18/2025  4:42 AM  Total Bilirubin: 1.7 mg/dL at 1/18/2025  4:42 AM  Serum Albumin: 2.1 g/dL at 1/18/2025  4:42 AM  INR(ratio): 2.19 at 1/18/2025  8:58 AM  Age at listing (hypothetical): 54 years  Sex: Female at 1/18/2025  8:58 AM    Ongoing discussions between IR, GI, critical care regarding order and timing of interventions, pending additional information.    11-20 minutes, >50% of the total time devoted to medical consultative verbal/EMR discussion between providers. Written report will be generated in the EMR.     Thank you for allowing Interventional Radiology to participate in the care of Bailey Olsen. Please don't hesitate to call or TigerText us with any questions.     Ismael Amaro MD

## 2025-01-18 NOTE — ASSESSMENT & PLAN NOTE
54-year-old female presents as a transfer to [facility] for rectal bleeding in the setting of decompensated cirrhosis for which GI is consulted. Patient initially presented to from [facility] with AMS c/f HE. Today she was noted to have rectal bleeding with acute drop in hemoglobin from 10.4 on admission to 5.6. PLTs 108. INR 2.1. BUN normal. 1u PRBC ordered so far. EGD in March 2024 showed small nonbleeding EV. Colonoscopy at the same time showed 1 polyp and internal hemorrhoids. Not on AP/AC. Fortunately she remains hemodynamically stable. Given the significant decline in hemoglobin in the setting of her cirrhosis, there is concern for variceal bleed as the etiology of her GI bleeding. Must of course consider the possibility of bleeding from other upper source such as PUD, PHG, GAVE, angiodysplasia, dieulafoy, malignancy.  However, given her normal BUN and she does not appear to be having frequent large-volume hematochezia, a lower GI source certainly a possibility.  Per IR, she has had minimal esophageal varices on previous CT imaging in December but also has a notable large splenorenal shunt with vascular anomalies involving the splenic flexure and descending colon which could represent varices versus angiodysplasia. CT high volume bleeding scan negative for obvious active GI bleeding.     Plan:  Plan for urgent bedside EGD on arrival to [facility]; Keep NPO for now  IR is consulted in case TIPS is required, however she would be at high risk for worsening encephalopathy given her recurrent HE and significant splenorenal shunt   Continue octreotide and Protonix infusions; continue SBP ppx   At least 2x large bore IV access  Blood transfusion per primary team; goal hgb 7-8 (please do not transfuse >8 to prevent increasing portal pressures)  See below for further management of cirrhosis

## 2025-01-18 NOTE — ASSESSMENT & PLAN NOTE
Melanotic stool 1/18   Hemoglobin drop to 5.6 from 10.4  Transfused as previously noted  Repeat colonoscopy is pending

## 2025-01-18 NOTE — PROCEDURES
Intubation    Date/Time: 1/18/2025 3:18 PM    Performed by: Trupti Johns DO  Authorized by: Trupti Johns DO    Patient location: ICU.  Other Assisting Provider: No    Consent:     Consent obtained:  Emergent situation  Universal protocol:     Relevant documents present and verified: yes      Test results available and properly labeled: yes      Radiology Images displayed and confirmed.  If images not available, report reviewed: yes      Required blood products, implants, devices, and special equipment available: yes      Site/side marked: yes      Immediately prior to procedure, a time out was called: yes      Patient identity confirmed:  Arm band  Pre-procedure details:     Patient status:  Altered mental status    Mallampati score:  2    Pretreatment medications:  Etomidate    Paralytics:  None  Indications:     Indications for intubation: airway protection    Procedure details:     Preoxygenation:  Bag valve mask    CPR in progress: no      Intubation method:  Oral    Oral intubation technique:  Fiber optic    Laryngoscope blade:  Mac 4    Tube size (mm):  7.5    Tube type:  Cuffed    Number of attempts:  2 (8.0 ETT not able to pass thru cords, downgrade to a 7.5 mm)    Ventilation between attempts: yes      Cricoid pressure: no      Tube visualized through cords: yes    Placement assessment:     ETT to lip:  20    Tube secured with:  ETT ramirez    Breath sounds:  Equal    Placement verification: chest rise, CXR verification, equal breath sounds and ETCO2 detector      CXR findings:  ETT in proper place  Post-procedure details:     Patient tolerance of procedure:  Tolerated well, no immediate complications

## 2025-01-18 NOTE — ASSESSMENT & PLAN NOTE
Melanotic stools 1/18   Admission hemoglobin 10.4 1/17  Hemoglobin trending down 5.9 and 5.6  2 units of PRBC given   Repeat HH 6.1     Plan:  Transfused 2 unit PRBC  Will continue to monitor hgb q 6 for now  We will transfuse for a hgb < 7  Repeat C-scope hopefully today

## 2025-01-18 NOTE — ASSESSMENT & PLAN NOTE
Daily percocet  since 2021   Required large amounts of sedation for intubation for upper endoscopy procedure   Will monitor for withdrawal

## 2025-01-18 NOTE — ASSESSMENT & PLAN NOTE
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

## 2025-01-19 ENCOUNTER — APPOINTMENT (INPATIENT)
Dept: RADIOLOGY | Facility: HOSPITAL | Age: 55
DRG: 377 | End: 2025-01-19
Payer: MEDICARE

## 2025-01-19 ENCOUNTER — APPOINTMENT (INPATIENT)
Dept: CT IMAGING | Facility: HOSPITAL | Age: 55
DRG: 377 | End: 2025-01-19
Payer: MEDICARE

## 2025-01-19 PROBLEM — R50.9 FEVER: Status: ACTIVE | Noted: 2025-01-19

## 2025-01-19 LAB
ABO GROUP BLD BPU: NORMAL
ALBUMIN SERPL BCG-MCNC: 2.2 G/DL (ref 3.5–5)
ALBUMIN SERPL BCG-MCNC: 2.2 G/DL (ref 3.5–5)
ALP SERPL-CCNC: 138 U/L (ref 34–104)
ALP SERPL-CCNC: 142 U/L (ref 34–104)
ALT SERPL W P-5'-P-CCNC: 46 U/L (ref 7–52)
ALT SERPL W P-5'-P-CCNC: 52 U/L (ref 7–52)
AMMONIA PLAS-SCNC: 106 UMOL/L (ref 18–72)
ANION GAP SERPL CALCULATED.3IONS-SCNC: 6 MMOL/L (ref 4–13)
ANION GAP SERPL CALCULATED.3IONS-SCNC: 8 MMOL/L (ref 4–13)
AST SERPL W P-5'-P-CCNC: 105 U/L (ref 13–39)
AST SERPL W P-5'-P-CCNC: 115 U/L (ref 13–39)
BASOPHILS # BLD AUTO: 0.07 THOUSANDS/ΜL (ref 0–0.1)
BASOPHILS NFR BLD AUTO: 1 % (ref 0–1)
BILIRUB DIRECT SERPL-MCNC: 1.55 MG/DL (ref 0–0.2)
BILIRUB SERPL-MCNC: 4.21 MG/DL (ref 0.2–1)
BILIRUB SERPL-MCNC: 4.27 MG/DL (ref 0.2–1)
BPU ID: NORMAL
BUN SERPL-MCNC: 20 MG/DL (ref 5–25)
BUN SERPL-MCNC: 23 MG/DL (ref 5–25)
CA-I BLD-SCNC: 1.08 MMOL/L (ref 1.12–1.32)
CALCIUM ALBUM COR SERPL-MCNC: 9 MG/DL (ref 8.3–10.1)
CALCIUM SERPL-MCNC: 7.4 MG/DL (ref 8.4–10.2)
CALCIUM SERPL-MCNC: 7.6 MG/DL (ref 8.4–10.2)
CHLORIDE SERPL-SCNC: 113 MMOL/L (ref 96–108)
CHLORIDE SERPL-SCNC: 113 MMOL/L (ref 96–108)
CO2 SERPL-SCNC: 26 MMOL/L (ref 21–32)
CO2 SERPL-SCNC: 27 MMOL/L (ref 21–32)
CREAT SERPL-MCNC: 0.8 MG/DL (ref 0.6–1.3)
CREAT SERPL-MCNC: 0.94 MG/DL (ref 0.6–1.3)
CROSSMATCH: NORMAL
EOSINOPHIL # BLD AUTO: 0.03 THOUSAND/ΜL (ref 0–0.61)
EOSINOPHIL NFR BLD AUTO: 0 % (ref 0–6)
ERYTHROCYTE [DISTWIDTH] IN BLOOD BY AUTOMATED COUNT: 17.7 % (ref 11.6–15.1)
GFR SERPL CREATININE-BSD FRML MDRD: 68 ML/MIN/1.73SQ M
GFR SERPL CREATININE-BSD FRML MDRD: 83 ML/MIN/1.73SQ M
GLUCOSE SERPL-MCNC: 159 MG/DL (ref 65–140)
GLUCOSE SERPL-MCNC: 164 MG/DL (ref 65–140)
HCT VFR BLD AUTO: 20 % (ref 34.8–46.1)
HCT VFR BLD AUTO: 22.1 % (ref 34.8–46.1)
HGB BLD-MCNC: 6.6 G/DL (ref 11.5–15.4)
HGB BLD-MCNC: 6.8 G/DL (ref 11.5–15.4)
HGB BLD-MCNC: 7.3 G/DL (ref 11.5–15.4)
HGB BLD-MCNC: 7.4 G/DL (ref 11.5–15.4)
HGB BLD-MCNC: 7.7 G/DL (ref 11.5–15.4)
IMM GRANULOCYTES # BLD AUTO: 0.04 THOUSAND/UL (ref 0–0.2)
IMM GRANULOCYTES NFR BLD AUTO: 0 % (ref 0–2)
INR PPP: 2.3 (ref 0.85–1.19)
LYMPHOCYTES # BLD AUTO: 2.75 THOUSANDS/ΜL (ref 0.6–4.47)
LYMPHOCYTES NFR BLD AUTO: 22 % (ref 14–44)
MAGNESIUM SERPL-MCNC: 1.8 MG/DL (ref 1.9–2.7)
MCH RBC QN AUTO: 30.6 PG (ref 26.8–34.3)
MCHC RBC AUTO-ENTMCNC: 33 G/DL (ref 31.4–37.4)
MCV RBC AUTO: 93 FL (ref 82–98)
MONOCYTES # BLD AUTO: 1 THOUSAND/ΜL (ref 0.17–1.22)
MONOCYTES NFR BLD AUTO: 8 % (ref 4–12)
NEUTROPHILS # BLD AUTO: 8.93 THOUSANDS/ΜL (ref 1.85–7.62)
NEUTS SEG NFR BLD AUTO: 69 % (ref 43–75)
NRBC BLD AUTO-RTO: 0 /100 WBCS
PHOSPHATE SERPL-MCNC: 2.7 MG/DL (ref 2.7–4.5)
PLATELET # BLD AUTO: 106 THOUSANDS/UL (ref 149–390)
PMV BLD AUTO: 10.3 FL (ref 8.9–12.7)
POTASSIUM SERPL-SCNC: 3.9 MMOL/L (ref 3.5–5.3)
POTASSIUM SERPL-SCNC: 4 MMOL/L (ref 3.5–5.3)
PROT SERPL-MCNC: 4.6 G/DL (ref 6.4–8.4)
PROT SERPL-MCNC: 4.7 G/DL (ref 6.4–8.4)
PROTHROMBIN TIME: 25.1 SECONDS (ref 12.3–15)
RBC # BLD AUTO: 2.16 MILLION/UL (ref 3.81–5.12)
SODIUM SERPL-SCNC: 146 MMOL/L (ref 135–147)
SODIUM SERPL-SCNC: 147 MMOL/L (ref 135–147)
UNIT DISPENSE STATUS: NORMAL
UNIT PRODUCT CODE: NORMAL
UNIT PRODUCT VOLUME: 350 ML
UNIT RH: NORMAL
WBC # BLD AUTO: 12.82 THOUSAND/UL (ref 4.31–10.16)

## 2025-01-19 PROCEDURE — 84100 ASSAY OF PHOSPHORUS: CPT

## 2025-01-19 PROCEDURE — 85018 HEMOGLOBIN: CPT | Performed by: NURSE PRACTITIONER

## 2025-01-19 PROCEDURE — 82330 ASSAY OF CALCIUM: CPT

## 2025-01-19 PROCEDURE — 85014 HEMATOCRIT: CPT | Performed by: NURSE PRACTITIONER

## 2025-01-19 PROCEDURE — 71045 X-RAY EXAM CHEST 1 VIEW: CPT

## 2025-01-19 PROCEDURE — 85025 COMPLETE CBC W/AUTO DIFF WBC: CPT

## 2025-01-19 PROCEDURE — 82140 ASSAY OF AMMONIA: CPT | Performed by: NURSE PRACTITIONER

## 2025-01-19 PROCEDURE — P9016 RBC LEUKOCYTES REDUCED: HCPCS

## 2025-01-19 PROCEDURE — 80076 HEPATIC FUNCTION PANEL: CPT | Performed by: STUDENT IN AN ORGANIZED HEALTH CARE EDUCATION/TRAINING PROGRAM

## 2025-01-19 PROCEDURE — 83735 ASSAY OF MAGNESIUM: CPT

## 2025-01-19 PROCEDURE — 99232 SBSQ HOSP IP/OBS MODERATE 35: CPT | Performed by: INTERNAL MEDICINE

## 2025-01-19 PROCEDURE — 85610 PROTHROMBIN TIME: CPT | Performed by: STUDENT IN AN ORGANIZED HEALTH CARE EDUCATION/TRAINING PROGRAM

## 2025-01-19 PROCEDURE — 70450 CT HEAD/BRAIN W/O DYE: CPT

## 2025-01-19 PROCEDURE — 87040 BLOOD CULTURE FOR BACTERIA: CPT | Performed by: NURSE PRACTITIONER

## 2025-01-19 PROCEDURE — 80053 COMPREHEN METABOLIC PANEL: CPT | Performed by: NURSE PRACTITIONER

## 2025-01-19 PROCEDURE — 80048 BASIC METABOLIC PNL TOTAL CA: CPT

## 2025-01-19 PROCEDURE — 99233 SBSQ HOSP IP/OBS HIGH 50: CPT | Performed by: INTERNAL MEDICINE

## 2025-01-19 RX ORDER — LIDOCAINE 50 MG/G
1 PATCH TOPICAL DAILY
Status: DISCONTINUED | OUTPATIENT
Start: 2025-01-19 | End: 2025-01-23 | Stop reason: HOSPADM

## 2025-01-19 RX ORDER — SPIRONOLACTONE 50 MG/1
50 TABLET, FILM COATED ORAL DAILY
Status: DISCONTINUED | OUTPATIENT
Start: 2025-01-19 | End: 2025-01-23 | Stop reason: HOSPADM

## 2025-01-19 RX ORDER — CYCLOBENZAPRINE HCL 10 MG
10 TABLET ORAL 3 TIMES DAILY PRN
Status: DISCONTINUED | OUTPATIENT
Start: 2025-01-19 | End: 2025-01-23 | Stop reason: HOSPADM

## 2025-01-19 RX ORDER — SODIUM CHLORIDE 9 MG/ML
75 INJECTION, SOLUTION INTRAVENOUS CONTINUOUS
Status: DISCONTINUED | OUTPATIENT
Start: 2025-01-19 | End: 2025-01-19

## 2025-01-19 RX ORDER — LACTULOSE 10 G/15ML
30 SOLUTION ORAL 4 TIMES DAILY
Status: DISCONTINUED | OUTPATIENT
Start: 2025-01-19 | End: 2025-01-19

## 2025-01-19 RX ORDER — LACTULOSE 10 G/15ML
20 SOLUTION ORAL 4 TIMES DAILY
Status: DISCONTINUED | OUTPATIENT
Start: 2025-01-19 | End: 2025-01-23 | Stop reason: HOSPADM

## 2025-01-19 RX ORDER — ONDANSETRON 2 MG/ML
4 INJECTION INTRAMUSCULAR; INTRAVENOUS EVERY 6 HOURS PRN
Status: DISCONTINUED | OUTPATIENT
Start: 2025-01-19 | End: 2025-01-23 | Stop reason: HOSPADM

## 2025-01-19 RX ORDER — TORSEMIDE 20 MG/1
20 TABLET ORAL DAILY
Status: DISCONTINUED | OUTPATIENT
Start: 2025-01-19 | End: 2025-01-23 | Stop reason: HOSPADM

## 2025-01-19 RX ADMIN — HYDROMORPHONE HYDROCHLORIDE 0.5 MG: 1 INJECTION, SOLUTION INTRAMUSCULAR; INTRAVENOUS; SUBCUTANEOUS at 21:15

## 2025-01-19 RX ADMIN — OSELTAMIVIR PHOSPHATE 75 MG: 75 CAPSULE ORAL at 21:14

## 2025-01-19 RX ADMIN — LACTULOSE 20 G: 20 SOLUTION ORAL at 08:43

## 2025-01-19 RX ADMIN — CHLORHEXIDINE GLUCONATE 0.12% ORAL RINSE 15 ML: 1.2 LIQUID ORAL at 21:14

## 2025-01-19 RX ADMIN — TORSEMIDE 20 MG: 20 TABLET ORAL at 12:38

## 2025-01-19 RX ADMIN — POLYETHYLENE GLYCOL 3350, SODIUM SULFATE ANHYDROUS, SODIUM BICARBONATE, SODIUM CHLORIDE, POTASSIUM CHLORIDE 4000 ML: 236; 22.74; 6.74; 5.86; 2.97 POWDER, FOR SOLUTION ORAL at 18:09

## 2025-01-19 RX ADMIN — LACTULOSE 20 G: 20 SOLUTION ORAL at 11:58

## 2025-01-19 RX ADMIN — OSELTAMIVIR PHOSPHATE 75 MG: 75 CAPSULE ORAL at 08:43

## 2025-01-19 RX ADMIN — LACTULOSE 20 G: 20 SOLUTION ORAL at 21:14

## 2025-01-19 RX ADMIN — HYDROMORPHONE HYDROCHLORIDE 0.5 MG: 1 INJECTION, SOLUTION INTRAMUSCULAR; INTRAVENOUS; SUBCUTANEOUS at 11:58

## 2025-01-19 RX ADMIN — CEFTRIAXONE 1000 MG: 10 INJECTION, POWDER, FOR SOLUTION INTRAVENOUS at 12:50

## 2025-01-19 RX ADMIN — RIFAXIMIN 550 MG: 550 TABLET ORAL at 20:13

## 2025-01-19 RX ADMIN — RIFAXIMIN 550 MG: 550 TABLET ORAL at 08:43

## 2025-01-19 RX ADMIN — HYDROMORPHONE HYDROCHLORIDE 0.5 MG: 1 INJECTION, SOLUTION INTRAMUSCULAR; INTRAVENOUS; SUBCUTANEOUS at 01:30

## 2025-01-19 RX ADMIN — CHLORHEXIDINE GLUCONATE 0.12% ORAL RINSE 15 ML: 1.2 LIQUID ORAL at 08:43

## 2025-01-19 RX ADMIN — LACTULOSE 20 G: 20 SOLUTION ORAL at 18:09

## 2025-01-19 RX ADMIN — SPIRONOLACTONE 50 MG: 50 TABLET ORAL at 12:38

## 2025-01-19 NOTE — NURSING NOTE
Patient incontinent of at least dark red/maroon loose bowel movements with blood clots noted overnight. Provider aware and to the bedside to assess stool for blood. Per provider recommendations, start a stool record and check H&H with morning lab work.

## 2025-01-19 NOTE — ASSESSMENT & PLAN NOTE
54-year-old female presents as a transfer to [facility] for rectal bleeding in the setting of decompensated cirrhosis for which GI is consulted. Patient initially presented to [facility] with AMS c/f HE. she was noted to have rectal bleeding with acute drop in hemoglobin from 10.4 on admission to 5.6. PLTs 108. INR 2.1. BUN normal. EGD in March 2024 showed small nonbleeding EV. Colonoscopy at the same time showed 1 polyp and internal hemorrhoids. Not on AP/AC. Fortunately she remains hemodynamically stable. Per IR, she has a notable large splenorenal shunt with vascular anomalies involving the splenic flexure and descending colon which could represent varices versus angiodysplasia. CT high volume bleeding scan negative for obvious active GI bleeding.     Bedside bidirectional endoscopy performed in the ICU 1/18. EGD showed no active bleeding, no varices; colonoscopy with significant amount of blood clots extending to the descending colon. Since then she has remained hemodynamically stable with ongoing loose dark/clots in stools as visualized on colonoscopy. She has received 3u PRBC so far.      Plan:  Keep NPO; Patient received 2L GoLytely yesterday; will given another 4L tonight and plan for complete colonoscopy on Monday to visualize more proximal areas   If patient decompensates with concern for ongoing significant GI bleed we could consider intervention sooner   Ok to continue CTX for ppx; discontinued PPI and octreotide gtt's   Rectal bleeding is expected given colonoscopy findings of significant blood clots; please notify on-call GI if patient develops worsening hemodynamics and/or declining hemoglobin  IR is consulted in case TIPS is required, however she would be at high risk for worsening encephalopathy given her recurrent HE and significant splenorenal shunt   At least 2x large bore IV access  Blood transfusion per primary team; goal hgb 7-8 (please do not transfuse >8 to prevent increasing portal pressures)  See below  for further management of cirrhosis

## 2025-01-19 NOTE — ASSESSMENT & PLAN NOTE
Unclear source of infection. No significant ascites amenable to paracentesis to rule out SBP. CXR negative. No other localizing signs of symptoms. Could be secondary to aspiration given her significant encephalopathy vs influenza infection vs bacteremia in the setting of GI bleeding  Follow-up blood cultures  Continue CTX

## 2025-01-19 NOTE — PROGRESS NOTES
Progress Note - Gastroenterology   Name: Bailey Olsen 54 y.o. female I MRN: [id number]  Unit/Bed#: ICU 07 I Date of Admission: 1/18/2025   Date of Service: 1/19/2025 I Hospital Day: 1     Assessment & Plan  Rectal bleeding  54-year-old female presents as a transfer to [facility] for rectal bleeding in the setting of decompensated cirrhosis for which GI is consulted. Patient initially presented to [facility] with AMS c/f HE. she was noted to have rectal bleeding with acute drop in hemoglobin from 10.4 on admission to 5.6. PLTs 108. INR 2.1. BUN normal. EGD in March 2024 showed small nonbleeding EV. Colonoscopy at the same time showed 1 polyp and internal hemorrhoids. Not on AP/AC. Fortunately she remains hemodynamically stable. Per IR, she has a notable large splenorenal shunt with vascular anomalies involving the splenic flexure and descending colon which could represent varices versus angiodysplasia. CT high volume bleeding scan negative for obvious active GI bleeding.     Bedside bidirectional endoscopy performed in the ICU 1/18. EGD showed no active bleeding, no varices; colonoscopy with significant amount of blood clots extending to the descending colon. Since then she has remained hemodynamically stable with ongoing loose dark/clots in stools as visualized on colonoscopy. She has received 3u PRBC so far.      Plan:  Keep NPO; Patient received 2L GoLytely yesterday; will given another 4L tonight and plan for complete colonoscopy on Monday to visualize more proximal areas   If patient decompensates with concern for ongoing significant GI bleed we could consider intervention sooner   Ok to continue CTX for ppx; discontinued PPI and octreotide gtt's   Rectal bleeding is expected given colonoscopy findings of significant blood clots; please notify on-call GI if patient develops worsening hemodynamics and/or declining hemoglobin  IR is consulted in case TIPS is required, however she would be at high risk for worsening  encephalopathy given her recurrent HE and significant splenorenal shunt   At least 2x large bore IV access  Blood transfusion per primary team; goal hgb 7-8 (please do not transfuse >8 to prevent increasing portal pressures)  See below for further management of cirrhosis  Decompensated HYATT cirrhosis with hepatic encephalopathy and GI bleed  Patient has a 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 Per chart review, patient was previously following with hepatology in [place] ([facility]) but was lost to follow-up in early 2024.  We are unable to view records from this practice, and it does not appear she has established care with hepatology since then.  It is not clear what previous workup has been done regarding her cirrhosis, but the patient denies having a previous liver biopsy. Patient has had 3 recent hospitalizations in September, October, and December of 2024 for recurrent hepatic encephalopathy with falls.  She now presents again with initial concern for HE and has since been transferred to [facility] for rectal bleeding as mentioned above.  Please see below for further management of cirrhosis.    MELD 3.0: 24 at 1/19/2025  1:03 PM  MELD-Na: 21 at 1/19/2025  1:03 PM  Calculated from:  Serum Creatinine: 0.94 mg/dL (Using min of 1 mg/dL) at 1/19/2025  1:03 PM  Serum Sodium: 147 mmol/L (Using max of 137 mmol/L) at 1/19/2025  1:03 PM  Total Bilirubin: 4.27 mg/dL at 1/19/2025  1:03 PM  Serum Albumin: 2.2 g/dL at 1/19/2025  1:03 PM  INR(ratio): 2.3 at 1/19/2025  9:26 AM  Age at listing (hypothetical): 54 years  Sex: Female at 1/19/2025  1:03 PM    Etiology: Suspected to be from MASLD.  Unclear if patient has had full serologic workup in the past.  This can be obtained as an outpatient.    Transplant Status: Unclear if this has been formally discussed to date since she was last to follow-up from her previous hepatologist in [place].  She will need to  establish care with hepatology and follow closely on discharge with discussion regarding transplant candidacy.  Ascites: Reportedly has never required paracentesis and she has never had clinically significant ascites however she has remained on spironolactone and torsemide for unclear reasons.  Most recent CT from December 2024 showed small volume ascites  Hold diuretics for now  If patient were to undergo paracentesis, replete 6-8 g albumin for every liter removed if > 5 L removed  Hepatic Encephalopathy (Type C): [place] Grade 3-4. Unable to follow commands. Suspected precipitants include her splenorenal shunt evidenced on CT imaging in addition to her chronic opioid use and current GI bleeding. Also noted to be febrile starting 1/18 so possible infectious source. CXR negative. Bedside ultrasound without significant ascites amendable to paracentesis for fluid analysis.   Complete infectious workup; follow-up blood cultures  Continue lactulose and rifaximin titrated to 3-4 BM/day  Diet: High protein/high calorie diet to prevent catabolic state w/ further ammonia production   Avoid narcotics and other sedating medications  Do not order or trend ammonia level   Varices: EGD March 2024 showed small nonbleeding EV. No prior history of known variceal bleeding. Inpatient bedside EGD 1/18 however showed no varices. See above for further details  NSBB: Not indicated at this time  Continue to monitor for signs of overt bleeding   Hepatoma screening: No suspicious masses seen on recent imaging.   Patient will require q.6-12month dedicated liver imaging for HCC monitoring  Acute anemia  As above   Fever  Unclear source of infection. No significant ascites amenable to paracentesis to rule out SBP. CXR negative. No other localizing signs of symptoms. Could be secondary to aspiration given her significant encephalopathy vs influenza infection vs bacteremia in the setting of GI bleeding  Follow-up blood cultures  Continue CTX    Hepatic encephalopathy (HCC)  As above  Influenza B      Subjective : Patient seen and examined at bedside. She is disoriented, grunting intermittently. Unable to obtain history.     Objective :  Vitals:    01/19/25 1315   BP: 109/52   Pulse: (!) 122   Resp: (!) 32   Temp:    SpO2: 96%        Physical Exam  General: No apparent distress, nontoxic   Head: Normocephalic, atraumatic  Eyes: Anicteric, no conjunctival erythema  ENT: External ear normal, no nasal discharge  Neck: Trachea midline, no visible lymphadenopathy   Respiratory:  Non-labored respirations, symmetric thorax expansion  Cardiovascular:  Extremities appear well-perfused  Abdomen: Non-distended  Extremities: Moves extremities spontaneously  Skin: No visible rashes or jaundice  Neuro: Disoriented, does not follow commands     Cherise Portillo D.O.  Gastroenterology Fellow, PGY-4  [facility]

## 2025-01-19 NOTE — ASSESSMENT & PLAN NOTE
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 Per chart review, patient was previously following with hepatology in [place] ([facility]) but was lost to follow-up in early 2024.  We are unable to view records from this practice, and it does not appear she has established care with hepatology since then.  It is not clear what previous workup has been done regarding her cirrhosis, but the patient denies having a previous liver biopsy. Patient has had 3 recent hospitalizations in September, October, and December of 2024 for recurrent hepatic encephalopathy with falls.  She now presents again with initial concern for HE and has since been transferred to [facility] for rectal bleeding as mentioned above.  Please see below for further management of cirrhosis.    MELD 3.0: 24 at 1/19/2025  1:03 PM  MELD-Na: 21 at 1/19/2025  1:03 PM  Calculated from:  Serum Creatinine: 0.94 mg/dL (Using min of 1 mg/dL) at 1/19/2025  1:03 PM  Serum Sodium: 147 mmol/L (Using max of 137 mmol/L) at 1/19/2025  1:03 PM  Total Bilirubin: 4.27 mg/dL at 1/19/2025  1:03 PM  Serum Albumin: 2.2 g/dL at 1/19/2025  1:03 PM  INR(ratio): 2.3 at 1/19/2025  9:26 AM  Age at listing (hypothetical): 54 years  Sex: Female at 1/19/2025  1:03 PM    Etiology: Suspected to be from MASLD.  Unclear if patient has had full serologic workup in the past.  This can be obtained as an outpatient.    Transplant Status: Unclear if this has been formally discussed to date since she was last to follow-up from her previous hepatologist in [place].  She will need to establish care with hepatology and follow closely on discharge with discussion regarding transplant candidacy.  Ascites: Reportedly has never required paracentesis and she has never had clinically significant ascites however she has remained on spironolactone and torsemide for unclear reasons.  Most recent CT from December 2024 showed small volume  ascites  Hold diuretics for now  If patient were to undergo paracentesis, replete 6-8 g albumin for every liter removed if > 5 L removed  Hepatic Encephalopathy (Type C): [place] Grade 3-4. Unable to follow commands. Suspected precipitants include her splenorenal shunt evidenced on CT imaging in addition to her chronic opioid use and current GI bleeding. Also noted to be febrile starting 1/18 so possible infectious source. CXR negative. Bedside ultrasound without significant ascites amendable to paracentesis for fluid analysis.   Complete infectious workup; follow-up blood cultures  Continue lactulose and rifaximin titrated to 3-4 BM/day  Diet: High protein/high calorie diet to prevent catabolic state w/ further ammonia production   Avoid narcotics and other sedating medications  Do not order or trend ammonia level   Varices: EGD March 2024 showed small nonbleeding EV. No prior history of known variceal bleeding. Inpatient bedside EGD 1/18 however showed no varices. See above for further details  NSBB: Not indicated at this time  Continue to monitor for signs of overt bleeding   Hepatoma screening: No suspicious masses seen on recent imaging.   Patient will require q.6-12month dedicated liver imaging for HCC monitoring

## 2025-01-19 NOTE — ASSESSMENT & PLAN NOTE
Elevated ammonia level on admission, now down trending  On lactulose, having bowel movements but also undergoing bowel prep for colonoscopy so difficult to  accurate amount  Will continue lactulose for now and check ammonia level  Will continue to monitor her neuro status closely  May need to consider a head CT if she fails to wake up more

## 2025-01-19 NOTE — PLAN OF CARE
Problem: Prexisting or High Potential for Compromised Skin Integrity  Goal: Skin integrity is maintained or improved  Description: INTERVENTIONS:  - Identify patients at risk for skin breakdown  - Assess and monitor skin integrity  - Assess and monitor nutrition and hydration status  - Monitor labs   - Assess for incontinence   - Turn and reposition patient  - Assist with mobility/ambulation  - Relieve pressure over bony prominences  - Avoid friction and shearing  - Provide appropriate hygiene as needed including keeping skin clean and dry  - Evaluate need for skin moisturizer/barrier cream  - Collaborate with interdisciplinary team   - Patient/family teaching  - Consider wound care consult   Outcome: Progressing     Problem: PAIN - ADULT  Goal: Verbalizes/displays adequate comfort level or baseline comfort level  Description: Interventions:  - Encourage patient to monitor pain and request assistance  - Assess pain using appropriate pain scale  - Administer analgesics based on type and severity of pain and evaluate response  - Implement non-pharmacological measures as appropriate and evaluate response  - Consider cultural and social influences on pain and pain management  - Notify physician/advanced practitioner if interventions unsuccessful or patient reports new pain  Outcome: Progressing     Problem: INFECTION - ADULT  Goal: Absence or prevention of progression during hospitalization  Description: INTERVENTIONS:  - Assess and monitor for signs and symptoms of infection  - Monitor lab/diagnostic results  - Monitor all insertion sites, i.e. indwelling lines, tubes, and drains  - Monitor endotracheal if appropriate and nasal secretions for changes in amount and color  - [place] appropriate cooling/warming therapies per order  - Administer medications as ordered  - Instruct and encourage patient and family to use good hand hygiene technique  - Identify and instruct in appropriate isolation precautions for  identified infection/condition  Outcome: Progressing  Goal: Absence of fever/infection during neutropenic period  Description: INTERVENTIONS:  - Monitor WBC    Outcome: Progressing     Problem: SAFETY ADULT  Goal: Patient will remain free of falls  Description: INTERVENTIONS:  - Educate patient/family on patient safety including physical limitations  - Instruct patient to call for assistance with activity   - Consult OT/PT to assist with strengthening/mobility   - Keep Call bell within reach  - Keep bed low and locked with side rails adjusted as appropriate  - Keep care items and personal belongings within reach  - Initiate and maintain comfort rounds  - Make Fall Risk Sign visible to staff  - Offer Toileting every 2 Hours, in advance of need  - Initiate/Maintain bed alarm  - Obtain necessary fall risk management equipment: fall signs, socks, bed alarm.  - Apply yellow socks and bracelet for high fall risk patients  - Consider moving patient to room near nurses station  Outcome: Progressing  Goal: Maintain or return to baseline ADL function  Description: INTERVENTIONS:  -  Assess patient's ability to carry out ADLs; assess patient's baseline for ADL function and identify physical deficits which impact ability to perform ADLs (bathing, care of mouth/teeth, toileting, grooming, dressing, etc.)  - Assess/evaluate cause of self-care deficits   - Assess range of motion  - Assess patient's mobility; develop plan if impaired  - Assess patient's need for assistive devices and provide as appropriate  - Encourage maximum independence but intervene and supervise when necessary  - Involve family in performance of ADLs  - Assess for home care needs following discharge   - Consider OT consult to assist with ADL evaluation and planning for discharge  - Provide patient education as appropriate  Outcome: Progressing  Goal: Maintains/Returns to pre admission functional level  Description: INTERVENTIONS:  - Perform AM-PAC 6 Click  Basic Mobility/ Daily Activity assessment daily.  - Set and communicate daily mobility goal to care team and patient/family/caregiver.   - Collaborate with rehabilitation services on mobility goals if consulted  - Perform Range of Motion 4 times a day.  - Reposition patient every 2 hours.  - Dangle patient 3 times a day  - Stand patient 3 times a day  - Ambulate patient 3 times a day  - Out of bed to chair 3 times a day   - Out of bed for meals 3 times a day  - Out of bed for toileting  - Record patient progress and toleration of activity level   Outcome: Progressing     Problem: DISCHARGE PLANNING  Goal: Discharge to home or other facility with appropriate resources  Description: INTERVENTIONS:  - Identify barriers to discharge w/patient and caregiver  - Arrange for needed discharge resources and transportation as appropriate  - Identify discharge learning needs (meds, wound care, etc.)  - Arrange for interpretive services to assist at discharge as needed  - Refer to Case Management Department for coordinating discharge planning if the patient needs post-hospital services based on physician/advanced practitioner order or complex needs related to functional status, cognitive ability, or social support system  Outcome: Progressing     Problem: Knowledge Deficit  Goal: Patient/family/caregiver demonstrates understanding of disease process, treatment plan, medications, and discharge instructions  Description: Complete learning assessment and assess knowledge base.  Interventions:  - Provide teaching at level of understanding  - Provide teaching via preferred learning methods  Outcome: Progressing     Problem: SAFETY,RESTRAINT: NV/NON-SELF DESTRUCTIVE BEHAVIOR  Goal: Remains free of harm/injury (restraint for non violent/non self-detsructive behavior)  Description: INTERVENTIONS:  - Instruct patient/family regarding restraint use   - Assess and monitor physiologic and psychological status   - Provide interventions and  comfort measures to meet assessed patient needs   - Identify and implement measures to help patient regain control  - Assess readiness for release of restraint   Outcome: Progressing  Goal: Returns to optimal restraint-free functioning  Description: INTERVENTIONS:  - Assess the patient's behavior and symptoms that indicate continued need for restraint  - Identify and implement measures to help patient regain control  - Assess readiness for release of restraint   Outcome: Progressing

## 2025-01-19 NOTE — NURSING NOTE
Patient has a BP of 95/41 (MAP of 59) using Masimo and a manual BP of 92/44 (MAP 60) this morning. Provider notified at this time. No further orders. Patient unable to express symptoms due to confusion. Patient remains alert to self only with difficulty following commands.

## 2025-01-19 NOTE — PROGRESS NOTES
Progress Note - Critical Care/ICU   Name: Bailey Olsen 54 y.o. female I MRN: [id number]  Unit/Bed#: ICU 07 I Date of Admission: 1/18/2025   Date of Service: 1/19/2025 I Hospital Day: 1      Assessment & Plan  Acute anemia  Melanotic stools 1/18   Admission hemoglobin 10.4 1/17  Hemoglobin trending down 5.9 and 5.6  2 units of PRBC given   Repeat HH 6.1     Plan:  Transfused 2 unit PRBC  Will continue to monitor hgb q 6 for now  We will transfuse for a hgb < 7  Repeat C-scope hopefully today        Decompensated HYATT cirrhosis with hepatic encephalopathy and GI bleed  Pt non verbal and moaning   Ammonia level on admission 183 , 1/18 (94)  1/18 underwent EGD and colonoscopy at bedside with prior intubation  No varices  Likely 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; 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  Large clot burden up to descending colon, unable to advance further  Plan:  Check daily ammonia levels  Lactulose 20 mg Q 4 hours  Rifaximin 550 mg PO bid   Monitor neuro status   Plan for repeat colonoscopy after bowel prep           Influenza B  1/17 Flu B positive   Respiratory precautions  Tamiflu 75mg Q 12 hours x 5 days start 1/18 end 1/23   Chronic, continuous use of opioids  Daily percocet  since 2021   Required large amounts of sedation for intubation for upper endoscopy procedure   Will monitor for withdrawal      Class 3 severe obesity due to excess calories in adult (HCC)  Morbid obesity BMI 41.6   Would benefit from weight loss   Rectal bleeding  Melanotic stool 1/18   Hemoglobin drop to 5.6 from 10.4  Transfused as previously noted  Repeat colonoscopy is pending    Hepatic encephalopathy (HCC)  Elevated ammonia level on admission, now down trending  On lactulose, having bowel movements but also undergoing bowel prep for colonoscopy so difficult to  accurate amount  Will continue lactulose for now and check ammonia level  Will continue to monitor her neuro  status closely  May need to consider a head CT if she fails to wake up more  Disposition: Stepdown Level 1    ICU Core Measures     A: Assess, Prevent, and Manage Pain Has pain been assessed? Yes  Need for changes to pain regimen? No   B: Both SAT/SAT  N/A   C: Choice of Sedation RASS Goal: N/A patient not on sedation  Need for changes to sedation or analgesia regimen? No   D: Delirium CAM-ICU: Negative   E: Early Mobility  Plan for early mobility? Yes   F: Family Engagement Plan for family engagement today? Yes     Antibiotic Review: Patient on appropriate coverage based on culture data.     Review of Invasive Devices:    [place] Plan: Continue for accurate I/O monitoring for 48 hours        Prophylaxis:  VTE VTE covered by:    None    Contraindicated secondary to: GI Bleed   Stress Ulcer  not orderedcovered bypantoprazole (PROTONIX) 40 mg tablet [[id number]] (Long-Term Med)         24 Hour Events : No events overnight  Subjective   Review of Systems: See HPI for Review of Systems    Objective :                   Vitals I/O      Most Recent Min/Max in 24hrs   Temp (!) 101.1 °F (38.4 °C) Temp  Min: 97.3 °F (36.3 °C)  Max: 101.3 °F (38.5 °C)   Pulse (!) 133 Pulse  Min: 103  Max: 137   Resp (!) 42 Resp  Min: 13  Max: 43   /60 BP  Min: 92/44  Max: 188/70   O2 Sat 97 % SpO2  Min: 95 %  Max: 100 %      Intake/Output Summary (Last 24 hours) at 1/19/2025 0541  Last data filed at 1/19/2025 0400  Gross per 24 hour   Intake 4581.79 ml   Output 1225 ml   Net 3356.79 ml       Diet NPO    Invasive Monitoring           Physical Exam   Physical Exam  Eyes:      Pupils: Pupils are equal, round, and reactive to light.   Skin:     General: Skin is warm and dry.   HENT:      Mouth/Throat:      Mouth: Mucous membranes are dry.   Cardiovascular:      Rate and Rhythm: Regular rhythm. Tachycardia present.   Abdominal:      Palpations: Abdomen is soft.      Tenderness: There is no abdominal tenderness.   Constitutional:        Appearance: She is ill-appearing.   Pulmonary:      Effort: Pulmonary effort is normal.      Breath sounds: Rales present.   Neurological:      Comments: Minimally responsive to even painful stimuli          Diagnostic Studies      Lab Results: I have reviewed the following results:     Medications:  Scheduled PRN   chlorhexidine, 15 mL, Q12H MIGUEL  lactulose, 20 g, 4x Daily  oseltamivir, 75 mg, Q12H MIGUEL  rifaximin, 550 mg, Q12H MIGUEL      HYDROmorphone, 0.5 mg, Q4H PRN       Continuous    lactated ringers, 125 mL/hr, Last Rate: 125 mL/hr (01/18/25 2100)         Labs:   CBC    Recent Labs     01/18/25  1637 01/18/25  2224 01/19/25  0015 01/19/25 0441   WBC 6.53  --   --  12.82*   HGB 6.1*   < > 7.4* 6.6*   HCT 18.9*  --   --  20.0*   PLT 79*  --   --  106*    < > = values in this interval not displayed.     BMP    Recent Labs     01/18/25 0442 01/19/25 0441   SODIUM 142 146   K 4.2 3.9   * 113*   CO2 27 27   AGAP 4 6   BUN 14 20   CREATININE 0.70 0.80   CALCIUM 7.8* 7.4*       Coags    Recent Labs     01/17/25  0957 01/18/25  0858 01/18/25  1637   INR 1.41* 2.19* 2.32*   PTT 35*  --   --         Additional Electrolytes  Recent Labs     01/18/25 0442 01/19/25 0441   MG 1.8* 1.8*   PHOS  --  2.7   CAIONIZED  --  1.08*          Blood Gas    Recent Labs     01/18/25 2053   PHART 7.566*   LIS1LGJ 23.0*   PO2ART 114.3   CPA3BGL 20.4*   BEART -1.2   SOURCE Radial, Left     Recent Labs     01/18/25 2053   SOURCE Radial, Left    LFTs  Recent Labs     01/17/25  0954 01/18/25 0442   ALT 16 12   AST 33 31   ALKPHOS 196* 132*   ALB 3.3* 2.1*   TBILI 1.47* 1.70*       Infectious  No recent results  Glucose  Recent Labs     01/17/25  0954 01/18/25  0442 01/19/25 0441   GLUC 141* 140 159*

## 2025-01-20 ENCOUNTER — ANESTHESIA (INPATIENT)
Dept: GASTROENTEROLOGY | Facility: HOSPITAL | Age: 55
DRG: 377 | End: 2025-01-20
Payer: MEDICARE

## 2025-01-20 ENCOUNTER — APPOINTMENT (INPATIENT)
Dept: GASTROENTEROLOGY | Facility: HOSPITAL | Age: 55
DRG: 377 | End: 2025-01-20
Payer: MEDICARE

## 2025-01-20 ENCOUNTER — ANESTHESIA EVENT (INPATIENT)
Dept: GASTROENTEROLOGY | Facility: HOSPITAL | Age: 55
DRG: 377 | End: 2025-01-20
Payer: MEDICARE

## 2025-01-20 PROBLEM — R50.9 FEVER: Status: RESOLVED | Noted: 2025-01-19 | Resolved: 2025-01-20

## 2025-01-20 LAB
ABO GROUP BLD BPU: NORMAL
ABO GROUP BLD BPU: NORMAL
ALBUMIN SERPL BCG-MCNC: 2.1 G/DL (ref 3.5–5)
ALP SERPL-CCNC: 143 U/L (ref 34–104)
ALT SERPL W P-5'-P-CCNC: 93 U/L (ref 7–52)
ANION GAP SERPL CALCULATED.3IONS-SCNC: 7 MMOL/L (ref 4–13)
AST SERPL W P-5'-P-CCNC: 223 U/L (ref 13–39)
BASOPHILS # BLD AUTO: 0.07 THOUSANDS/ΜL (ref 0–0.1)
BASOPHILS NFR BLD AUTO: 1 % (ref 0–1)
BILIRUB SERPL-MCNC: 3.52 MG/DL (ref 0.2–1)
BPU ID: NORMAL
BPU ID: NORMAL
BUN SERPL-MCNC: 23 MG/DL (ref 5–25)
CALCIUM ALBUM COR SERPL-MCNC: 8.9 MG/DL (ref 8.3–10.1)
CALCIUM SERPL-MCNC: 7.4 MG/DL (ref 8.4–10.2)
CHLORIDE SERPL-SCNC: 114 MMOL/L (ref 96–108)
CO2 SERPL-SCNC: 31 MMOL/L (ref 21–32)
CREAT SERPL-MCNC: 0.85 MG/DL (ref 0.6–1.3)
CROSSMATCH: NORMAL
CROSSMATCH: NORMAL
EOSINOPHIL # BLD AUTO: 0.02 THOUSAND/ΜL (ref 0–0.61)
EOSINOPHIL NFR BLD AUTO: 0 % (ref 0–6)
ERYTHROCYTE [DISTWIDTH] IN BLOOD BY AUTOMATED COUNT: 17.2 % (ref 11.6–15.1)
GFR SERPL CREATININE-BSD FRML MDRD: 77 ML/MIN/1.73SQ M
GLUCOSE SERPL-MCNC: 153 MG/DL (ref 65–140)
HCT VFR BLD AUTO: 20.9 % (ref 34.8–46.1)
HGB BLD-MCNC: 6.3 G/DL (ref 11.5–15.4)
HGB BLD-MCNC: 7.2 G/DL (ref 11.5–15.4)
HGB BLD-MCNC: 8 G/DL (ref 11.5–15.4)
IMM GRANULOCYTES # BLD AUTO: 0.13 THOUSAND/UL (ref 0–0.2)
IMM GRANULOCYTES NFR BLD AUTO: 1 % (ref 0–2)
LYMPHOCYTES # BLD AUTO: 2.13 THOUSANDS/ΜL (ref 0.6–4.47)
LYMPHOCYTES NFR BLD AUTO: 15 % (ref 14–44)
MAGNESIUM SERPL-MCNC: 1.9 MG/DL (ref 1.9–2.7)
MCH RBC QN AUTO: 30.5 PG (ref 26.8–34.3)
MCHC RBC AUTO-ENTMCNC: 34.4 G/DL (ref 31.4–37.4)
MCV RBC AUTO: 89 FL (ref 82–98)
MONOCYTES # BLD AUTO: 0.84 THOUSAND/ΜL (ref 0.17–1.22)
MONOCYTES NFR BLD AUTO: 6 % (ref 4–12)
NEUTROPHILS # BLD AUTO: 10.85 THOUSANDS/ΜL (ref 1.85–7.62)
NEUTS SEG NFR BLD AUTO: 77 % (ref 43–75)
NRBC BLD AUTO-RTO: 0 /100 WBCS
PHOSPHATE SERPL-MCNC: 2.2 MG/DL (ref 2.7–4.5)
PLATELET # BLD AUTO: 89 THOUSANDS/UL (ref 149–390)
PMV BLD AUTO: 10.3 FL (ref 8.9–12.7)
POTASSIUM SERPL-SCNC: 3.3 MMOL/L (ref 3.5–5.3)
PROT SERPL-MCNC: 4.4 G/DL (ref 6.4–8.4)
RBC # BLD AUTO: 2.36 MILLION/UL (ref 3.81–5.12)
SODIUM SERPL-SCNC: 152 MMOL/L (ref 135–147)
UNIT DISPENSE STATUS: NORMAL
UNIT DISPENSE STATUS: NORMAL
UNIT PRODUCT CODE: NORMAL
UNIT PRODUCT CODE: NORMAL
UNIT PRODUCT VOLUME: 350 ML
UNIT PRODUCT VOLUME: 350 ML
UNIT RH: NORMAL
UNIT RH: NORMAL
WBC # BLD AUTO: 14.04 THOUSAND/UL (ref 4.31–10.16)

## 2025-01-20 PROCEDURE — 99232 SBSQ HOSP IP/OBS MODERATE 35: CPT | Performed by: INTERNAL MEDICINE

## 2025-01-20 PROCEDURE — 85018 HEMOGLOBIN: CPT | Performed by: NURSE PRACTITIONER

## 2025-01-20 PROCEDURE — 84100 ASSAY OF PHOSPHORUS: CPT | Performed by: NURSE PRACTITIONER

## 2025-01-20 PROCEDURE — 45378 DIAGNOSTIC COLONOSCOPY: CPT | Performed by: STUDENT IN AN ORGANIZED HEALTH CARE EDUCATION/TRAINING PROGRAM

## 2025-01-20 PROCEDURE — 85025 COMPLETE CBC W/AUTO DIFF WBC: CPT | Performed by: NURSE PRACTITIONER

## 2025-01-20 PROCEDURE — P9016 RBC LEUKOCYTES REDUCED: HCPCS

## 2025-01-20 PROCEDURE — 80053 COMPREHEN METABOLIC PANEL: CPT | Performed by: NURSE PRACTITIONER

## 2025-01-20 PROCEDURE — 83735 ASSAY OF MAGNESIUM: CPT | Performed by: NURSE PRACTITIONER

## 2025-01-20 RX ORDER — POTASSIUM CHLORIDE 14.9 MG/ML
20 INJECTION INTRAVENOUS ONCE
Status: COMPLETED | OUTPATIENT
Start: 2025-01-20 | End: 2025-01-20

## 2025-01-20 RX ORDER — PROPOFOL 10 MG/ML
INJECTION, EMULSION INTRAVENOUS AS NEEDED
Status: DISCONTINUED | OUTPATIENT
Start: 2025-01-20 | End: 2025-01-20

## 2025-01-20 RX ORDER — DEXTROSE MONOHYDRATE 50 MG/ML
50 INJECTION, SOLUTION INTRAVENOUS CONTINUOUS
Status: DISCONTINUED | OUTPATIENT
Start: 2025-01-20 | End: 2025-01-21

## 2025-01-20 RX ORDER — SODIUM CHLORIDE 9 MG/ML
INJECTION, SOLUTION INTRAVENOUS CONTINUOUS PRN
Status: DISCONTINUED | OUTPATIENT
Start: 2025-01-20 | End: 2025-01-20

## 2025-01-20 RX ADMIN — CEFTRIAXONE 1000 MG: 10 INJECTION, POWDER, FOR SOLUTION INTRAVENOUS at 11:30

## 2025-01-20 RX ADMIN — LACTULOSE 20 G: 20 SOLUTION ORAL at 11:20

## 2025-01-20 RX ADMIN — HYDROMORPHONE HYDROCHLORIDE 0.5 MG: 1 INJECTION, SOLUTION INTRAMUSCULAR; INTRAVENOUS; SUBCUTANEOUS at 22:03

## 2025-01-20 RX ADMIN — LACTULOSE 20 G: 20 SOLUTION ORAL at 08:00

## 2025-01-20 RX ADMIN — RIFAXIMIN 550 MG: 550 TABLET ORAL at 08:00

## 2025-01-20 RX ADMIN — PROPOFOL 30 MG: 10 INJECTION, EMULSION INTRAVENOUS at 17:14

## 2025-01-20 RX ADMIN — HYDROMORPHONE HYDROCHLORIDE 0.5 MG: 1 INJECTION, SOLUTION INTRAMUSCULAR; INTRAVENOUS; SUBCUTANEOUS at 09:34

## 2025-01-20 RX ADMIN — HYDROMORPHONE HYDROCHLORIDE 0.5 MG: 1 INJECTION, SOLUTION INTRAMUSCULAR; INTRAVENOUS; SUBCUTANEOUS at 03:22

## 2025-01-20 RX ADMIN — PROPOFOL 70 MCG/KG/MIN: 10 INJECTION, EMULSION INTRAVENOUS at 17:18

## 2025-01-20 RX ADMIN — POTASSIUM PHOSPHATE 21 MMOL: 236; 224 INJECTION, SOLUTION INTRAVENOUS at 18:44

## 2025-01-20 RX ADMIN — CHLORHEXIDINE GLUCONATE 0.12% ORAL RINSE 15 ML: 1.2 LIQUID ORAL at 08:00

## 2025-01-20 RX ADMIN — LIDOCAINE 1 PATCH: 50 PATCH TOPICAL at 08:01

## 2025-01-20 RX ADMIN — POTASSIUM CHLORIDE 20 MEQ: 14.9 INJECTION, SOLUTION INTRAVENOUS at 08:10

## 2025-01-20 RX ADMIN — PROPOFOL 100 MG: 10 INJECTION, EMULSION INTRAVENOUS at 17:11

## 2025-01-20 RX ADMIN — SPIRONOLACTONE 50 MG: 50 TABLET ORAL at 08:00

## 2025-01-20 RX ADMIN — CHLORHEXIDINE GLUCONATE 0.12% ORAL RINSE 15 ML: 1.2 LIQUID ORAL at 22:21

## 2025-01-20 RX ADMIN — SODIUM CHLORIDE: 0.9 INJECTION, SOLUTION INTRAVENOUS at 17:02

## 2025-01-20 RX ADMIN — LACTULOSE 20 G: 20 SOLUTION ORAL at 18:44

## 2025-01-20 RX ADMIN — TORSEMIDE 20 MG: 20 TABLET ORAL at 08:00

## 2025-01-20 RX ADMIN — RIFAXIMIN 550 MG: 550 TABLET ORAL at 22:20

## 2025-01-20 RX ADMIN — DEXTROSE 50 ML/HR: 5 SOLUTION INTRAVENOUS at 05:51

## 2025-01-20 RX ADMIN — OSELTAMIVIR PHOSPHATE 75 MG: 75 CAPSULE ORAL at 08:00

## 2025-01-20 RX ADMIN — LACTULOSE 20 G: 20 SOLUTION ORAL at 22:20

## 2025-01-20 RX ADMIN — SODIUM CHLORIDE 8 MCG: 9 INJECTION, SOLUTION INTRAVENOUS at 17:11

## 2025-01-20 NOTE — ANESTHESIA PREPROCEDURE EVALUATION
Procedure:  COLONOSCOPY  EGD    Relevant Problems   CARDIO   (+) Primary hypertension      GI/HEPATIC   (+) Decompensated HYATT cirrhosis with hepatic encephalopathy and GI bleed   (+) Rectal bleeding   (+) Upper GI bleed      /RENAL   (+) KISHORE (acute kidney injury) (HCC)      HEMATOLOGY   (+) Acute anemia   (+) Acute blood loss anemia   (+) Iron deficiency anemia   (+) Pancytopenia (HCC)      MUSCULOSKELETAL   (+) Fibromyalgia   (+) Intractable back pain      NEURO/PSYCH   (+) Chronic, continuous use of opioids   (+) Fibromyalgia      Neurology/Sleep   (+) Acute metabolic encephalopathy   (+) Hepatic encephalopathy (HCC)      Respiratory/Allergy   (+) Bilateral pulmonary infiltrates      Other   (+) Class 3 severe obesity due to excess calories in adult (HCC)   (+) Morbid obesity with BMI of 40.0-44.9, adult ([facility])   (+) Sarcoidosis        Physical Exam    Airway    Mallampati score: unable to assess  TM Distance: >3 FB       Dental   Comment: Poor dentition     Cardiovascular  Rhythm: regular, Rate: abnormal    Pulmonary   Rhonchi    Other Findings  post-pubertal.      Anesthesia Plan  ASA Score- 4 Emergent    Anesthesia Type- general with ASA Monitors.         Additional Monitors:     Airway Plan:            Plan Factors-    Chart reviewed. EKG reviewed.  Existing labs reviewed. Patient summary reviewed.                  Induction- intravenous.    Postoperative Plan-     Perioperative Resuscitation Plan - Level 1 - Full Code.       Informed Consent- Anesthetic plan and risks discussed with healthcare power of .  I personally reviewed this patient with the CRNA. Discussed and agreed on the Anesthesia Plan with the CRNA..      NPO Status:  No vitals data found for the desired time range.

## 2025-01-20 NOTE — OCCUPATIONAL THERAPY NOTE
Occupational Therapy Cancellation        Patient Name: Bailey Olsen  Today's Date: 1/20/2025 01/20/25 0914   OT Last Visit   OT Visit Date 01/20/25   Note Type   Note type Cancelled Session   Cancel Reasons Medical status   Additional Comments OT consult received. Chart reveiwed and spoke with nursing. Resting .  Pt with increased confusion, abnormal labs and not able appropriate for therapy evaluation at this time.Will continue to follow as appropriate.     Documentation completed by: Tosin Houston MS, OTR/L

## 2025-01-20 NOTE — PLAN OF CARE
Problem: Prexisting or High Potential for Compromised Skin Integrity  Goal: Skin integrity is maintained or improved  Description: INTERVENTIONS:  - Identify patients at risk for skin breakdown  - Assess and monitor skin integrity  - Assess and monitor nutrition and hydration status  - Monitor labs   - Assess for incontinence   - Turn and reposition patient  - Assist with mobility/ambulation  - Relieve pressure over bony prominences  - Avoid friction and shearing  - Provide appropriate hygiene as needed including keeping skin clean and dry  - Evaluate need for skin moisturizer/barrier cream  - Collaborate with interdisciplinary team   - Patient/family teaching  - Consider wound care consult   Outcome: Progressing     Problem: INFECTION - ADULT  Goal: Absence or prevention of progression during hospitalization  Description: INTERVENTIONS:  - Assess and monitor for signs and symptoms of infection  - Monitor lab/diagnostic results  - Monitor all insertion sites, i.e. indwelling lines, tubes, and drains  - Monitor endotracheal if appropriate and nasal secretions for changes in amount and color  - [place] appropriate cooling/warming therapies per order  - Administer medications as ordered  - Instruct and encourage patient and family to use good hand hygiene technique  - Identify and instruct in appropriate isolation precautions for identified infection/condition  Outcome: Progressing  Goal: Absence of fever/infection during neutropenic period  Description: INTERVENTIONS:  - Monitor WBC    Outcome: Progressing     Problem: CARDIOVASCULAR - ADULT  Goal: Maintains optimal cardiac output and hemodynamic stability  Description: INTERVENTIONS:  - Monitor I/O, vital signs and rhythm  - Monitor for S/S and trends of decreased cardiac output  - Administer and titrate ordered vasoactive medications to optimize hemodynamic stability  - Assess quality of pulses, skin color and temperature  - Assess for signs of decreased  coronary artery perfusion  - Instruct patient to report change in severity of symptoms  Outcome: Progressing  Goal: Absence of cardiac dysrhythmias or at baseline rhythm  Description: INTERVENTIONS:  - Continuous cardiac monitoring, vital signs, obtain 12 lead EKG if ordered  - Administer antiarrhythmic and heart rate control medications as ordered  - Monitor electrolytes and administer replacement therapy as ordered  Outcome: Progressing     Problem: METABOLIC, FLUID AND ELECTROLYTES - ADULT  Goal: Electrolytes maintained within normal limits  Description: INTERVENTIONS:  - Monitor labs and assess patient for signs and symptoms of electrolyte imbalances  - Administer electrolyte replacement as ordered  - Monitor response to electrolyte replacements, including repeat lab results as appropriate  - Instruct patient on fluid and nutrition as appropriate  Outcome: Progressing  Goal: Fluid balance maintained  Description: INTERVENTIONS:  - Monitor labs   - Monitor I/O and WT  - Instruct patient on fluid and nutrition as appropriate  - Assess for signs & symptoms of volume excess or deficit  Outcome: Progressing  Goal: Glucose maintained within target range  Description: INTERVENTIONS:  - Monitor Blood Glucose as ordered  - Assess for signs and symptoms of hyperglycemia and hypoglycemia  - Administer ordered medications to maintain glucose within target range  - Assess nutritional intake and initiate nutrition service referral as needed  Outcome: Progressing     Problem: HEMATOLOGIC - ADULT  Goal: Maintains hematologic stability  Description: INTERVENTIONS  - Assess for signs and symptoms of bleeding or hemorrhage  - Monitor labs  - Administer supportive blood products/factors as ordered and appropriate  Outcome: Progressing     Problem: PAIN - ADULT  Goal: Verbalizes/displays adequate comfort level or baseline comfort level  Description: Interventions:  - Encourage patient to monitor pain and request assistance  - Assess  pain using appropriate pain scale  - Administer analgesics based on type and severity of pain and evaluate response  - Implement non-pharmacological measures as appropriate and evaluate response  - Consider cultural and social influences on pain and pain management  - Notify physician/advanced practitioner if interventions unsuccessful or patient reports new pain  Outcome: Not Progressing     Problem: SAFETY ADULT  Goal: Maintain or return to baseline ADL function  Description: INTERVENTIONS:  -  Assess patient's ability to carry out ADLs; assess patient's baseline for ADL function and identify physical deficits which impact ability to perform ADLs (bathing, care of mouth/teeth, toileting, grooming, dressing, etc.)  - Assess/evaluate cause of self-care deficits   - Assess range of motion  - Assess patient's mobility; develop plan if impaired  - Assess patient's need for assistive devices and provide as appropriate  - Encourage maximum independence but intervene and supervise when necessary  - Involve family in performance of ADLs  - Assess for home care needs following discharge   - Consider OT consult to assist with ADL evaluation and planning for discharge  - Provide patient education as appropriate  Outcome: Not Progressing  Goal: Maintains/Returns to pre admission functional level  Description: INTERVENTIONS:  - Perform AM-PAC 6 Click Basic Mobility/ Daily Activity assessment daily.  - Set and communicate daily mobility goal to care team and patient/family/caregiver.   - Collaborate with rehabilitation services on mobility goals if consulted  - Out of bed for toileting  - Record patient progress and toleration of activity level   Outcome: Not Progressing     Problem: DISCHARGE PLANNING  Goal: Discharge to home or other facility with appropriate resources  Description: INTERVENTIONS:  - Identify barriers to discharge w/patient and caregiver  - Arrange for needed discharge resources and transportation as  appropriate  - Identify discharge learning needs (meds, wound care, etc.)  - Arrange for interpretive services to assist at discharge as needed  - Refer to Case Management Department for coordinating discharge planning if the patient needs post-hospital services based on physician/advanced practitioner order or complex needs related to functional status, cognitive ability, or social support system  Outcome: Not Progressing     Problem: Knowledge Deficit  Goal: Patient/family/caregiver demonstrates understanding of disease process, treatment plan, medications, and discharge instructions  Description: Complete learning assessment and assess knowledge base.  Interventions:  - Provide teaching at level of understanding  - Provide teaching via preferred learning methods  Outcome: Not Progressing     Problem: NEUROSENSORY - ADULT  Goal: Achieves stable or improved neurological status  Description: INTERVENTIONS  - Monitor and report changes in neurological status  - Monitor vital signs such as temperature, blood pressure, glucose, and any other labs ordered   - Initiate measures to prevent increased intracranial pressure  - Monitor for seizure activity and implement precautions if appropriate      Outcome: Not Progressing

## 2025-01-20 NOTE — UTILIZATION REVIEW
Initial Clinical Review    Admission: Date/Time/Statement:   Admission Orders (From admission, onward)       Ordered        01/18/25 1440  INPATIENT ADMISSION  Once                          Orders Placed This Encounter   Procedures    INPATIENT ADMISSION     Standing Status:   Standing     Number of Occurrences:   1     Level of Care:   Critical Care [15]     Estimated length of stay:   More than 2 Midnights     Certification:   I certify that inpatient services are medically necessary for this patient for a duration of greater than two midnights. See H&P and MD Progress Notes for additional information about the patient's course of treatment.     ED Arrival Information       Patient not seen in ED                           Initial Presentation: 54 y.o. female PMH liver cirrhosis with recurrent hospitalization for hepatic encephalopathy, chronic back pain and continuous opioid dependence, morbid obesity, pancytopenia, Influenza B  transferred from [facility] to [facility] ICU inpatient admission due to decompensated HYATT cirrhosis w acute hepatic encephalopathy, GI bleed- rectal, acute blood loss anemia  @ referring campus admitted w hepatic encephalopathy;  given Lactulose retention enema. This AM, had multiple bowel movements after enema including several bloody bowel movements and hemoglobin dropped to 5 w/ tachycardia.  Started on Protonix and octreotide drips. Underwent a high-volume bleeding scan that was negative. Transferred for GI evaluation with IR availability in case TIPS is needed   EXAM  mumbling & not interactive, melanotic stool     PLAN  IV Sedation & intubation for EGD procedure then extubate, hold gabapentin cont OP Hydroxychloroquine, OP Spirolactone/ Torsemide. PPI drip, Octreotide drip, planned for EGD at bedside today and possible colonoscopy; continue lactulose and rifaximin, goal 3-4 formed bowel movements daily; IR consulted, given recurrent encephalopathy, may need to consider    TIPS to divert flow away plus/minus embolization of the shunt versus BRTO/PARTO/CARTO very tortuous, defer to GI consult  GI  Bedside bidirectional endoscopy performed in the ICU. No source of active bleeding- unremarkable EGD without varices; colonoscopy with significant amount of blood clots extending to the descending colon.   Keep NPO; Place OG tube and start GoLytely bowel prep (2L tonight and 4L tomorrow) with tentative plan for complete colonoscopy on Monday to visualize more proximal areas   If  decompensates with concern for ongoing significant GI bleed we could consider intervention sooner   Ok to continue IV CTX for ppx; discontinue PPI and octreotide gtt's   Rectal bleeding is expected given colonoscopy findings of significant blood clots; please notify on-call GI if patient develops worsening hemodynamics and/or declining hemoglobin  Date: 1/19/2025   Day 2: ICU  Received 2 units of prbcs yesterday.  Tolerated EGD and limited colonoscopy, found to have clots in colon.  Required large amt of sedation for intubation for EGD. Extubated post procedure, remains encephalopathic. Febrile overnight.  Received 1 unit prbcs this AM. Received GoLytely 2 L overnight.  maintain off sedating medications as able;   Obtain CT head; can give Acetaminophen as needed up to 2g per day; concern for narcotic withdrawal, trial low dose Dilaudid PRN; O/P Elavil, Flexeril, Sertraline, Gabapentin on hold while lethargic; O/P Hydroxychloroquine on hold   Resume O/P Spironolactone and Torsemide, does not seem volume overloaded; hold O/P Carvedilol. On room air obtain CXR  Cont. Lactulose and Rifaximin per GI; bowel prep tonight for colonoscopy tomorrow; given recurrent encephalopathy, may need to consider   TIPS to divert flow away plus/minus embolization of the shunt versus BRTO/PARTO/CARTO very tortuous, defer to GI; liver txp candidate? Defer to GI. TX Prn HGB < 7 g/dl, HGB Q 6HR; IV Ceftriaxone day 2/7 for SBP ppx. d/c  Oseltamavir since pt was flu positive 12/26 and this is likely not new flu; trace ascites on CT with no significant ascites on u/s   GI  MELD 3.0: 24 at 1/19/2025  1:03 PM  MELD-Na: 21 at 1/19/2025  1:03 PM  Per IR, she has a notable large splenorenal shunt with vascular anomalies involving the splenic flexure and descending colon which could represent varices versus angiodysplasia. CT high volume bleeding scan negative for obvious active GI bleeding.      Bedside bidirectional endoscopy performed in the ICU 1/18 w/  no active bleeding, no varices; colonoscopy with significant amount of blood clots extending to the descending colon. HD stable w ongoing loose dark/clots in stools as visualized on colonoscopy. Received 3u PRBC so far.   Keep NPO; Patient received 2L GoLytely yesterday; will given another 4L tonight NGT and plan for complete colonoscopy on Monday to visualize more proximal areas   DATE  1/20/2025  ICU  No significant events overnight , monitor HGB Q 6hr, TX for HGB < 7, repeat C scope hopefully today after bowel prep   Check daily ammonia levels  Lactulose 20 mg Q 4 hours  Rifaximin 550 mg PO bid   Monitor neuro status, humberto need CTH if she fails to wake up more    ED Treatment-Medication Administration - No Administrations Displayed (No Start Event Found)       None            Scheduled Medications:  cefTRIAXone, 1,000 mg, Intravenous, Q24H  chlorhexidine, 15 mL, Swish & Spit, Q12H MIGUEL  lactulose, 20 g, Oral, 4x Daily  lidocaine, 1 patch, Topical, Daily  rifaximin, 550 mg, Oral, Q12H MIGUEL  spironolactone, 50 mg, Oral, Daily  torsemide, 20 mg, Oral, Daily      Continuous IV Infusions:      Medications 01/18 01/19 01/20   dextrose 5 % infusion  Rate: 50 mL/hr Dose: 50 mL/hr  Freq: Continuous Route: IV  Indications of Use: IV Hydration  Last Dose: 50 mL/hr (01/20/25 0551)  Start: 01/20/25 0600 0551        lactated ringers infusion  Rate: 125 mL/hr Dose: 125 mL/hr  Freq: Continuous Route: IV  Last  Dose: Stopped (01/19/25 1158)  Start: 01/18/25 1515 End: 01/19/25 1155    1648     1915 [C]     2100 [C]      0610 [C]     0930     1155-D/C'd  1158 [C]         octreotide (SandoSTATIN) 500 mcg in sodium chloride 0.9 % 250 mL infusion  Rate: 25 mL/hr Dose: 50 mcg/hr  Freq: Continuous Route: IV  Start: 01/19/25 1200 End: 01/19/25 1155   Admin Instructions:        1155-D/C'd        octreotide (SandoSTATIN) injection 50 mcg  Dose: 50 mcg  Freq: Once Route: IV  Start: 01/18/25 1545 End: 01/18/25 1632   Admin Instructions:       1632          Followed by   octreotide (SandoSTATIN) 500 mcg in sodium chloride 0.9 % 250 mL infusion  Rate: 25 mL/hr Dose: 50 mcg/hr  Freq: Continuous Route: IV  Last Dose: Stopped (01/18/25 1728)  Start: 01/18/25 1600 End: 01/18/25 1719   Admin Instructions:       1640     1719-D/C'd  1728 [C]          octreotide (SandoSTATIN) 500 mcg in sodium chloride 0.9 % 250 mL infusion  Rate: 25 mL/hr Dose: 50 mcg/hr  Freq: Continuous Route: IV  Last Dose: 50 mcg/hr (01/18/25 1028)  Start: 01/18/25 0930 End: 01/18/25 1412   Admin Instructions:       1028     1412 [C]     1412-D/C'd        pantoprazole (PROTONIX) 80 mg in sodium chloride 0.9 % 100 mL infusion  Rate: 10 mL/hr Dose: 8 mg/hr  Freq: Continuous Route: IV  Last Dose: 8 mg/hr (01/18/25 0955)  Start: 01/18/25 0845 End: 01/18/25 1412   Admin Instructions:       0955     1412 [C]     1412-D/C'd        propofol (DIPRIVAN) 1000 mg in 100 mL infusion (premix)  Rate: 3.4-33.9 mL/hr Dose: 5-50 mcg/kg/min  Weight Dosing Info: 113 kg  Freq: Titrated Route: IV  Last Dose: Stopped (01/18/25 1726)  Start: 01/18/25 1530 End: 01/18/25 1842       1530     1726     1842-D/C'd          PRN Meds:  cyclobenzaprine, 10 mg, Oral, TID PRN  HYDROmorphone, 0.5 mg, Intravenous, Q4H PRN  ondansetron, 4 mg, Intravenous, Q6H PRN      ED Triage Vitals   Temperature Pulse Respirations Blood Pressure SpO2 Pain Score   01/18/25 1413 01/18/25 1413 01/18/25 1413 01/18/25 1413  01/18/25 1413 01/18/25 1615   100 °F (37.8 °C) (!) 121 (!) 26 135/68 98 % Med Not Given for Pain - for MAR use only     Weight (last 2 days)       Date/Time Weight    01/20/25 0538 121 (265.65)    01/19/25 0600 122 (269.18)    01/19/25 0200 122 (269.18)    01/18/25 1528 121 (266.1)            Vital Signs (last 3 days)       Date/Time Temp Pulse Resp BP MAP (mmHg) SpO2 Calculated FIO2 (%) - Nasal Cannula Nasal Cannula O2 Flow Rate (L/min) O2 Device Patient Position - Orthostatic VS Iris Coma Scale Score Pain    01/20/25 1300 99.5 °F (37.5 °C) 112 28 -- -- 97 % -- -- -- -- -- --    01/20/25 1230 99.5 °F (37.5 °C) 114 31 124/56 81 95 % -- -- -- -- -- --    01/20/25 1200 99.5 °F (37.5 °C) -- -- -- -- -- -- -- None (Room air) Lying 9 --    01/20/25 1100 99.7 °F (37.6 °C) 117 19 111/54 78 93 % -- -- -- -- -- --    01/20/25 1000 100 °F (37.8 °C) 121 20 117/57 80 94 % -- -- -- -- -- --    01/20/25 0900 100 °F (37.8 °C) 125 39 -- -- 93 % -- -- -- -- -- --    01/20/25 0845 -- -- -- -- -- -- -- -- -- -- 9 --    01/20/25 0815 99.5 °F (37.5 °C) 116 32 117/56 77 96 % -- -- -- Lying -- --    01/20/25 0800 99.9 °F (37.7 °C) 126 26 -- -- 95 % -- -- None (Room air) -- -- --    01/20/25 0700 99.9 °F (37.7 °C) 125 24 112/53 77 95 % -- -- -- -- -- --    01/20/25 0600 99.7 °F (37.6 °C) 126 37 114/57 81 95 % -- -- -- -- -- --    01/20/25 0500 99 °F (37.2 °C) 130 29 98/55 73 94 % -- -- -- -- -- --    01/20/25 0404 -- -- -- -- -- -- -- -- -- -- 8 --    01/20/25 0400 -- 130 26 107/65 81 94 % -- -- None (Room air) Lying - Orthostatic VS -- --    01/20/25 0330 -- 125 26 120/58 84 94 % -- -- -- -- -- --    01/20/25 0322 -- -- -- -- -- -- -- -- -- -- -- Med Not Given for Pain - for MAR use only    01/20/25 0000 -- 118 20 117/59 66 99 % -- -- Nasal cannula Lying -- --    01/19/25 2345 99.1 °F (37.3 °C) 120 20 111/55 65 99 % -- -- Nasal cannula -- 8 --    01/19/25 2332 -- 118 21 114/61 77 99 % -- -- -- -- -- --    01/19/25 2300 -- 116 24 85/58  69 99 % -- -- -- -- -- --    01/19/25 2200 -- 122 25 103/53 73 97 % -- -- -- -- -- --    01/19/25 2145 -- 124 32 102/59 75 97 % -- -- -- -- -- --    01/19/25 2124 98 °F (36.7 °C) 126 31 104/64 -- 98 % 28 2 L/min Nasal cannula -- -- --    01/19/25 2115 -- -- -- -- -- -- -- -- -- -- -- Med Not Given for Pain - for MAR use only    01/19/25 2100 -- 124 35 102/63 78 -- -- -- -- Lying -- --    01/19/25 2000 -- 126 25 131/61 85 96 % -- -- -- -- -- --    01/19/25 1945 99.2 °F (37.3 °C) 126 26 117/59 80 95 % -- -- -- -- 7 --    01/19/25 1939 100 °F (37.8 °C) 127 25 102/76 -- -- -- -- -- -- -- --    01/19/25 1856 99.5 °F (37.5 °C) -- -- -- -- -- -- -- -- -- -- --    01/19/25 1800 -- 128 24 112/71 91 91 % -- -- -- -- -- --    01/19/25 1700 -- 127 25 107/56 75 92 % -- -- -- -- -- --    01/19/25 1610 -- 124 20 -- -- 92 % -- -- -- -- 6 --    01/19/25 1600 99.7 °F (37.6 °C) 124 23 103/55 72 92 % -- -- None (Room air) Lying -- --    01/19/25 1500 -- 128 27 -- -- 93 % -- -- -- -- -- --    01/19/25 1400 -- 128 28 -- -- 95 % -- -- -- -- -- --    01/19/25 1315 -- 122 32 109/52 75 96 % -- -- -- -- -- --    01/19/25 1300 -- 122 36 -- -- 90 % -- -- -- -- -- --    01/19/25 1245 98.6 °F (37 °C) 124 27 106/58 79 98 % -- -- None (Room air) Lying -- --    01/19/25 1230 -- -- -- -- -- -- -- -- -- -- 6 --    01/19/25 1158 -- -- -- -- -- -- -- -- -- -- -- Med Not Given for Pain - for MAR use only    01/19/25 1115 -- 126 33 138/86 103 98 % -- -- -- -- -- --    01/19/25 1100 -- 126 25 -- -- 100 % 20 0 L/min -- -- -- --    01/19/25 1045 -- 124 24 127/67 92 100 % -- -- -- -- -- --    01/19/25 1030 -- 126 29 -- -- 100 % -- -- -- -- -- --    01/19/25 1015 -- 126 29 126/60 79 100 % -- -- -- -- -- --    01/19/25 1000 -- 125 30 137/67 88 100 % -- -- -- -- -- --    01/19/25 0927 98.8 °F (37.1 °C) 124 27 130/78 98 100 % -- -- -- -- -- --    01/19/25 0900 -- 126 28 125/74 95 100 % 36 4 L/min -- -- -- --    01/19/25 0800 -- 126 21 108/72 84 100 % 44 6 L/min  Nasal cannula -- 6 --    01/19/25 0700 99.4 °F (37.4 °C) 121 31 116/56 80 100 % -- -- -- -- -- --    01/19/25 0630 100.1 °F (37.8 °C) 128 32 119/56 80 99 % -- -- -- -- -- --    01/19/25 0615 100.5 °F (38.1 °C) 130 23 130/60 85 98 % -- -- -- -- -- --    01/19/25 0605 100.8 °F (38.2 °C) 133 23 131/60 -- -- -- -- -- -- -- --    01/19/25 0600 -- 133 25 131/60 87 97 % -- -- -- -- -- --    01/19/25 0540 101.1 °F (38.4 °C) 133 42 133/63 90 98 % -- -- -- -- -- --    01/19/25 0500 101.3 °F (38.5 °C) 133 42 131/60 87 97 % -- -- -- -- -- --    01/19/25 0430 101.3 °F (38.5 °C) 135 33 135/73 99 96 % -- -- -- -- -- --    01/19/25 0405 -- -- -- -- -- -- -- -- -- -- 6 --    01/19/25 0400 101.3 °F (38.5 °C) 132 28 130/60 85 96 % -- -- -- -- -- --    01/19/25 0330 101.1 °F (38.4 °C) 136 37 153/74 106 96 % -- -- -- -- -- --    01/19/25 0300 100.9 °F (38.3 °C) 134 43 153/66 95 96 % -- -- -- -- -- --    01/19/25 0230 101.1 °F (38.4 °C) 129 27 128/84 101 97 % -- -- -- -- -- --    01/19/25 0200 101.1 °F (38.4 °C) 135 32 144/73 101 97 % -- -- -- -- -- --    01/19/25 0130 101.3 °F (38.5 °C) 137 33 147/71 102 96 % -- -- -- -- -- Med Not Given for Pain - for MAR use only    01/19/25 0100 101.3 °F (38.5 °C) 137 35 156/69 99 97 % -- -- -- -- -- --    01/19/25 0000 100.8 °F (38.2 °C) 130 26 131/61 88 98 % -- -- -- -- 6 --    01/18/25 2330 100.4 °F (38 °C) 130 31 159/71 102 97 % -- -- -- -- -- --    01/18/25 2300 100.4 °F (38 °C) 126 22 120/60 82 98 % 44 6 L/min Nasal cannula -- -- --    01/18/25 2230 100.8 °F (38.2 °C) 130 33 123/58 84 98 % -- -- -- -- -- --    01/18/25 2200 100.9 °F (38.3 °C) 129 24 121/58 83 97 % -- -- -- -- -- --    01/18/25 2130 100.8 °F (38.2 °C) 134 38 161/64 96 98 % -- -- -- -- -- --    01/18/25 2103 -- -- -- -- -- -- -- -- -- -- -- Med Not Given for Pain - for MAR use only    01/18/25 2100 100.8 °F (38.2 °C) 133 25 178/77 111 99 % 44 6 L/min -- -- -- --    01/18/25 2045 100.9 °F (38.3 °C) 129 34 159/71 102 99 % -- -- --  -- -- --    01/18/25 2030 100.9 °F (38.3 °C) 130 21 178/70 100 95 % -- -- -- -- -- --    01/18/25 2015 100.9 °F (38.3 °C) 135 35 167/74 106 97 % -- -- -- -- -- --    01/18/25 2000 100.8 °F (38.2 °C) 134 25 164/67 97 98 % -- -- -- -- -- --    01/18/25 1930 100.8 °F (38.2 °C) 129 25 154/70 101 98 % -- -- -- -- 6 --    01/18/25 1915 100.8 °F (38.2 °C) 130 27 139/69 97 98 % -- -- -- -- -- --    01/18/25 1902 100.8 °F (38.2 °C) 129 22 143/70 -- -- -- -- -- -- -- --    01/18/25 1900 100.8 °F (38.2 °C) 130 33 143/70 101 98 % -- -- -- -- -- --    01/18/25 1830 -- 128 40 133/60 86 99 % -- -- -- -- -- --    01/18/25 1800 -- 126 35 181/72 104 99 % -- -- -- -- -- --    01/18/25 1730 -- 116 13 131/63 90 99 % -- -- -- -- -- --    01/18/25 1700 -- 104 24 110/59 80 100 % -- -- -- -- -- --    01/18/25 1645 100.4 °F (38 °C) 103 22 106/53 74 100 % -- -- -- -- -- --    01/18/25 1615 -- -- -- -- -- -- -- -- -- -- -- Med Not Given for Pain - for MAR use only    01/18/25 1600 100.8 °F (38.2 °C) 123 36 141/71 99 100 % -- -- -- -- 6 --    01/18/25 1530 100.4 °F (38 °C) 117 26 131/71 95 100 % -- -- -- -- -- --    01/18/25 1515 100.2 °F (37.9 °C) 120 29 188/70 -- 100 % -- -- -- -- -- --    01/18/25 1500 100 °F (37.8 °C) 118 30 153/84 106 100 % -- -- -- -- -- --    01/18/25 1445 100 °F (37.8 °C) 123 23 155/71 -- 96 % -- -- -- -- -- --    01/18/25 1415 -- -- -- -- -- -- -- -- -- -- 10 --    01/18/25 1413 100 °F (37.8 °C) 121 26 135/68 94 98 % -- -- None (Room air) Lying -- --              Pertinent Labs/Diagnostic Test Results:   Radiology:  XR chest portable ICU   Final Interpretation by Iván Partida MD (01/19 8698)      Mild left parahilar groundglass opacity may represent pneumonia.            Workstation performed: [id number]         CT head wo contrast   Final Interpretation by Bradley Landon Kocher, MD (01/19 4528)   No acute intracranial abnormality.            There is motion artifact which limits evaluation of the skull  base and mandible region.      Resident: SHELBY ALCANTAR I, the attending radiologist, have reviewed the images and agree with the final report above.      Workstation performed: [id number]         XR chest portable ICU   Final Interpretation by Katie Otero MD (01/19 0608)      No acute cardiopulmonary disease.      ET tube 4 cm above the myra.      Workstation performed: [id number]           Cardiology:  No orders to display     GI:  EGD   Final Result by Mary Delgado MD (01/18 1630)   The upper third of the esophagus and middle third of the esophagus    appeared normal.   No obvious esophageal varices were seen.   Healthy previous total gastrectomy and duodenectomy with esophagojejunal    anastomosis in the lower third of the esophagus   The examined efferent jejunal limb was unremarkable.   There was no potential source of bleeding, no bleeding lesion or stigmata    of recent bleeding on this exam.   There was some clot present in the    esophagus that was lavaged and completely cleared to obtain good views of    the esophagus (this was bleeding from an oral laceration sustained during    endotracheal intubation).         RECOMMENDATION:   Proceed with colonoscopy    Continue Ceftriaxone for prophylaxis of SBP.   Can discontinue Protonix and Octreotide.   Continue to monitor hemoglobin.       Colonoscopy   Final Result by Mary Delgado MD (01/18 0389)   Significant amount of blood clotting in the descending colon, sigmoid    colon, rectosigmoid and rectum 55 cm from the anal verge, which obscured    visualization   No bright red blood was visualized in the examined portion of the colon.            RECOMMENDATION:   Schedule repeat colonoscopy, due: 1/20/2025   Inadequate bowel preparation       Keep patient NPO. Given altered mental status, plan to administer bowel    preparation via OG tube (begin prep today).  If patient remains stable,    then plan for colonoscopy on Monday 1/20.  If  unstable, may need    colonoscopy sooner.   Continue Ceftriaxone.   Monitor Hgb.                 Results from last 7 days   Lab Units 01/20/25  1143 01/20/25  0438 01/19/25  2331 01/19/25  1814 01/19/25  1303 01/19/25  0441 01/18/25  2224 01/18/25  1637 01/18/25  0953   WBC Thousand/uL  --  14.04*  --   --   --  12.82*  --  6.53  --    HEMOGLOBIN g/dL 6.3* 7.2* 7.7* 6.8* 7.3* 6.6*   < > 6.1* 5.6*   HEMATOCRIT %  --  20.9*  --   --  22.1* 20.0*  --  18.9* 16.5*   PLATELETS Thousands/uL  --  89*  --   --   --  106*  --  79*  --    TOTAL NEUT ABS Thousands/µL  --  10.85*  --   --   --  8.93*  --  5.23  --     < > = values in this interval not displayed.         Results from last 7 days   Lab Units 01/20/25  0438 01/19/25  1303 01/19/25 0441 01/18/25  0442 01/17/25  0954   SODIUM mmol/L 152* 147 146 142 138   POTASSIUM mmol/L 3.3* 4.0 3.9 4.2 3.3*   CHLORIDE mmol/L 114* 113* 113* 111* 100   CO2 mmol/L 31 26 27 27 33*   ANION GAP mmol/L 7 8 6 4 5   BUN mg/dL 23 23 20 14 13   CREATININE mg/dL 0.85 0.94 0.80 0.70 0.86   EGFR ml/min/1.73sq m 77 68 83 97 76   CALCIUM mg/dL 7.4* 7.6* 7.4* 7.8* 8.6   CALCIUM, IONIZED mmol/L  --   --  1.08*  --   --    MAGNESIUM mg/dL 1.9  --  1.8* 1.8*  --    PHOSPHORUS mg/dL 2.2*  --  2.7  --   --      Results from last 7 days   Lab Units 01/20/25  0438 01/19/25  1303 01/19/25  0926 01/19/25  0512 01/18/25 0442 01/17/25  0954   AST U/L 223* 115* 105*  --  31 33   ALT U/L 93* 52 46  --  12 16   ALK PHOS U/L 143* 142* 138*  --  132* 196*   TOTAL PROTEIN g/dL 4.4* 4.7* 4.6*  --  4.6* 7.6   ALBUMIN g/dL 2.1* 2.2* 2.2*  --  2.1* 3.3*   TOTAL BILIRUBIN mg/dL 3.52* 4.27* 4.21*  --  1.70* 1.47*   BILIRUBIN DIRECT mg/dL  --   --  1.55*  --   --   --    AMMONIA umol/L  --   --   --  106* 94* 183*     Results from last 7 days   Lab Units 01/17/25  1434   POC GLUCOSE mg/dl 102     Results from last 7 days   Lab Units 01/20/25  0438 01/19/25  1303 01/19/25  0441 01/18/25  0442 01/17/25  0954  "01/16/25  1231   GLUCOSE RANDOM mg/dL 153* 164* 159* 140 141* 107             No results found for: \"BETA-HYDROXYBUTYRATE\"   Results from last 7 days   Lab Units 01/18/25  2053   PH ART  7.566*   PCO2 ART mm Hg 23.0*   PO2 ART mm Hg 114.3   HCO3 ART mmol/L 20.4*   BASE EXC ART mmol/L -1.2   O2 CONTENT ART mL/dL 10.1*   O2 HGB, ARTERIAL % 95.9   ABG SOURCE  Radial, Left                         Results from last 7 days   Lab Units 01/19/25  0926 01/18/25  1637 01/18/25  0858 01/17/25  0957   PROTIME seconds 25.1* 25.2* 24.5* 17.6*   INR  2.30* 2.32* 2.19* 1.41*   PTT seconds  --   --   --  35*             Results from last 7 days   Lab Units 01/18/25  0858 01/18/25  0704 01/17/25  1251 01/17/25  0954   LACTIC ACID mmol/L 3.5* 3.6* 2.5* 2.2*             Results from last 7 days   Lab Units 01/16/25  1231   BNP pg/mL 8             Results from last 7 days   Lab Units 01/20/25  1308 01/20/25  0530 01/19/25  1206 01/19/25  0637 01/18/25  1022   UNIT PRODUCT CODE  [id number]  [id number]  [id number]  [id number]  [id number]  [id number]   UNIT NUMBER  [id number]  [id number]  [id number]  [id number]  [id number]  [id number]   UNITABO  A  A A  A A  A A  A A  A   UNITRH  POS  POS POS  POS POS  POS POS  POS POS  POS   CROSSMATCH  Compatible  Compatible Compatible  Compatible Compatible  Compatible Compatible  Compatible  --    UNIT DISPENSE STATUS  Issued  Crossmatched Presumed Trans  Presumed Trans Return to Inv  Presumed Trans Presumed Trans  Presumed Trans Crossmatched  Crossmatched   UNIT PRODUCT VOL ml 350  350 350  350 350  350 350  350 280  280         Results from last 7 days   Lab Units 01/17/25  0954   LIPASE u/L 7*                 Results from last 7 days   Lab Units 01/17/25  1157   CLARITY UA  Clear   COLOR UA  Yellow   SPEC GRAV UA  <=1.005   PH UA  7.0   GLUCOSE UA " mg/dl Negative   KETONES UA mg/dl Negative   BLOOD UA  Trace-Intact*   PROTEIN UA mg/dl Negative   NITRITE UA  Negative   BILIRUBIN UA  Negative   UROBILINOGEN UA E.U./dl 1.0   LEUKOCYTES UA  Negative   WBC UA /hpf 0-1   RBC UA /hpf 0-1   BACTERIA UA /hpf Occasional   EPITHELIAL CELLS WET PREP /hpf Occasional             Results from last 7 days   Lab Units 01/17/25  1157   AMPH/METH  Negative   BARBITURATE UR  Negative   BENZODIAZEPINE UR  Negative   COCAINE UR  Negative   METHADONE URINE  Negative   OPIATE UR  Positive*   PCP UR  Negative   THC UR  Negative     Results from last 7 days   Lab Units 01/17/25  0954   ETHANOL LVL mg/dL <10                 Results from last 7 days   Lab Units 01/19/25  1302 01/19/25  1107   BLOOD CULTURE  Received in Microbiology Lab. Culture in Progress. Received in Microbiology Lab. Culture in Progress.                   Past Medical History:   Diagnosis Date    Back pain     Carcinoid tumor     neuroendocrine    CHF (congestive heart failure) (HCC)     Cirrhosis of liver (HCC)     Sarcoidosis      Present on Admission:   Decompensated HYATT cirrhosis with hepatic encephalopathy and GI bleed   Rectal bleeding   Class 3 severe obesity due to excess calories in adult (HCC)   Influenza B   Chronic, continuous use of opioids   Hepatic encephalopathy (HCC)      Admitting Diagnosis: GI bleed [K92.2]  Age/Sex: 54 y.o. female    Network Utilization Review Department  ATTENTION: Please call with any questions or concerns to [phone] and carefully listen to the prompts so that you are directed to the right person. All voicemails are confidential.   For Discharge needs, contact Care Management DC Support Team at [phone] opt. [phone]  Send all requests for admission clinical reviews, approved or denied determinations and any other requests to dedicated fax number below belonging to the campus where the patient is receiving treatment. List of dedicated fax numbers for the Facilities:  FACILITY  NAME UR FAX NUMBER   ADMISSION DENIALS (Administrative/Medical Necessity) [phone]   DISCHARGE SUPPORT TEAM (NETWORK) [phone]   PARENT CHILD HEALTH (Maternity/NICU/Pediatrics) [phone]   [facility] [phone]   [facility] [phone]   [facility] [phone]   [facility] [phone]   [facility] [phone]   [facility] [phone]   [facility] [phone]   [facility] [phone]   [facility] [phone]   [facility] [phone]   [facility] [facility] [phone]   [facility] [phone]

## 2025-01-20 NOTE — ASSESSMENT & PLAN NOTE
Elevated ammonia level on admission, it was down trending but yesterday, 106     Plan:  Will continue lactulose and check ammonia level  Will continue Rifaximin 550 mg PO BID  Will continue to monitor her neuro status closely  May need to consider a head CT if she fails to wake up more

## 2025-01-20 NOTE — UTILIZATION REVIEW
NOTIFICATION OF ADMISSION DISCHARGE   This is a Notification of Discharge from [facility]. Please be advised that this patient has been discharge from our facility. Below you will find the admission and discharge date and time including the patient’s disposition.   UTILIZATION REVIEW CONTACT:  Елена Self  Utilization   Network Utilization Review Department  Phone: [phone] x carefully listen to the prompts. All voicemails are confidential.  Email: [email or web address]     ADMISSION INFORMATION  PRESENTATION DATE: 1/17/2025  9:40 AM  OBERVATION ADMISSION DATE: N/A  INPATIENT ADMISSION DATE: 1/17/25 10:51 AM   DISCHARGE DATE: 1/18/2025  1:11 PM   DISPOSITION:[facility] Utilization Review Department  ATTENTION: Please call with any questions or concerns to [phone] and carefully listen to the prompts so that you are directed to the right person. All voicemails are confidential.   For Discharge needs, contact Care Management DC Support Team at [phone] opt. 2  Send all requests for admission clinical reviews, approved or denied determinations and any other requests to dedicated fax number below belonging to the campus where the patient is receiving treatment. List of dedicated fax numbers for the Facilities:  FACILITY NAME UR FAX NUMBER   ADMISSION DENIALS (Administrative/Medical Necessity) [phone]   DISCHARGE SUPPORT TEAM ([facility]) [phone]   PARENT CHILD HEALTH (Maternity/NICU/Pediatrics) [phone]   [facility] [phone]   [facility] [phone]   [facility] [phone]   [facility] [phone]   [facility] [phone]   [facility] [phone]   [facility] [phone]   [facility]  [facility] [phone]   [facility] [phone]   [facility] [phone]   [facility] [phone]   [facility] [phone]

## 2025-01-20 NOTE — ASSESSMENT & PLAN NOTE
Daily percocet  since 2021   Required large amounts of sedation for intubation for upper endoscopy procedure   Will monitor for withdrawal  Put her on scheduled laxatives

## 2025-01-20 NOTE — PROGRESS NOTES
Progress Note - Critical Care/ICU   Name: Bailey Olsen 54 y.o. female I MRN: [id number]  Unit/Bed#: ICU 07 I Date of Admission: 1/18/2025   Date of Service: 1/20/2025 I Hospital Day: 2      Assessment & Plan  Acute anemia  Melanotic stools 1/18   Admission hemoglobin 10.4 1/17  Hemoglobin trending down 5.9 and 5.6  2 units of PRBC given   Repeat HH 6.1     Plan:    Will continue to monitor hgb q 6 for now  We will transfuse for a hgb < 7  Repeat C-scope hopefully today        Decompensated HYATT cirrhosis with hepatic encephalopathy and GI bleed  Pt non verbal and moaning   Ammonia level on admission 183 , 1/18 (94)  1/18 underwent EGD and colonoscopy at bedside with prior intubation  No varices  Likely 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; 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  Large clot burden up to descending colon, unable to advance further  Plan:  Check daily ammonia levels  Lactulose 20 mg Q 4 hours  Rifaximin 550 mg PO bid   Monitor neuro status   Plan for repeat colonoscopy after bowel prep           Influenza B  1/17 Flu B positive     Plan:  Respiratory precautions  Tamiflu 75mg Q 12 hours x 5 days start 1/18 end 1/23   Monitor V/S closely  Chronic, continuous use of opioids  Daily percocet  since 2021   Required large amounts of sedation for intubation for upper endoscopy procedure   Will monitor for withdrawal  Put her on scheduled laxatives      Class 3 severe obesity due to excess calories in adult (HCC)  Morbid obesity BMI 41.6   Would benefit from weight loss    on low calorie diet  Rectal bleeding  Melanotic stool 1/18   Hemoglobin drop to 5.6 from 10.4    Plan    See assessment and plan for anemia    Hepatic encephalopathy (HCC)  Elevated ammonia level on admission, it was down trending but yesterday, 106     Plan:  Will continue lactulose and check ammonia level  Will continue Rifaximin 550 mg PO BID  Will continue to monitor her neuro status  closely  May need to consider a head CT if she fails to wake up more  Disposition: Critical care    ICU Core Measures     A: Assess, Prevent, and Manage Pain Has pain been assessed? Yes  Need for changes to pain regimen? No   B: Both SAT/SAT  N/A   C: Choice of Sedation RASS Goal: -1 Drowsy or 0 Alert and Calm  Need for changes to sedation or analgesia regimen? No   D: Delirium CAM-ICU: Negative   E: Early Mobility  Plan for early mobility? Yes   F: Family Engagement Plan for family engagement today? Yes       Antibiotic Review: yes    Review of Invasive Devices:    Adebayo Plan: Continue for accurate I/O monitoring for 48 hours        Prophylaxis:  VTE VTE covered by:    None    Contraindicated secondary to: GI Bleed   Stress Ulcer  not orderedcovered bypantoprazole (PROTONIX) 40 mg tablet [[id number]] (Long-Term Med)         24 Hour Events : No significant over night events  Subjective   Review of Systems: See HPI for Review of Systems    Objective :                   Vitals I/O      Most Recent Min/Max in 24hrs   Temp 99.9 °F (37.7 °C) Temp  Min: 98 °F (36.7 °C)  Max: 100 °F (37.8 °C)   Pulse (!) 125 Pulse  Min: 116  Max: 130   Resp (!) 24 Resp  Min: 20  Max: 37   /53 BP  Min: 85/58  Max: 138/86   O2 Sat 95 % SpO2  Min: 90 %  Max: 100 %      Intake/Output Summary (Last 24 hours) at 1/20/2025 0905  Last data filed at 1/20/2025 0437  Gross per 24 hour   Intake 4302.5 ml   Output 2605 ml   Net 1697.5 ml       Diet NPO; Sips with meds    Invasive Monitoring           Physical Exam   Physical Exam  Eyes:      Extraocular Movements: Extraocular movements intact.   Skin:     General: Skin is warm.      Capillary Refill: Capillary refill takes less than 2 seconds.      Findings: No rash.   HENT:      Head: Normocephalic and atraumatic.      Right Ear: Tympanic membrane normal.      Left Ear: Tympanic membrane normal.      Nose: Nasogastric tube present. No nasal tenderness, congestion or epistaxis.      Mouth/Throat:       Mouth: Mucous membranes are dry.   Cardiovascular:      Rate and Rhythm: Regular rhythm. Tachycardia present.   Musculoskeletal:         General: No swelling or tenderness.   Abdominal:      Palpations: Abdomen is soft.      Tenderness: There is no abdominal tenderness. There is no guarding.   Constitutional:       General: She is not in acute distress.     Appearance: She is not ill-appearing.      Interventions: She is not intubated.  Pulmonary:      Effort: No accessory muscle usage, respiratory distress or accessory muscle usage. She is not intubated.      Breath sounds: Normal breath sounds.   Neurological:      Mental Status: She is alert. She is somnolent and agitated.   Genitourinary/Anorectal:  Fiore present.        Diagnostic Studies        Lab Results: I have reviewed the following results:     Medications:  Scheduled PRN   cefTRIAXone, 1,000 mg, Q24H  chlorhexidine, 15 mL, Q12H MIGUEL  lactulose, 20 g, 4x Daily  lidocaine, 1 patch, Daily  oseltamivir, 75 mg, Q12H MIGUEL  potassium chloride, 20 mEq, Once  rifaximin, 550 mg, Q12H MIGUEL  spironolactone, 50 mg, Daily  torsemide, 20 mg, Daily      cyclobenzaprine, 10 mg, TID PRN  HYDROmorphone, 0.5 mg, Q4H PRN  ondansetron, 4 mg, Q6H PRN       Continuous    dextrose, 50 mL/hr, Last Rate: 50 mL/hr (01/20/25 0551)         Labs:   CBC    Recent Labs     01/19/25  0441 01/19/25  1303 01/19/25  1814 01/19/25  2331 01/20/25  0438   WBC 12.82*  --   --   --  14.04*   HGB 6.6* 7.3*   < > 7.7* 7.2*   HCT 20.0* 22.1*  --   --  20.9*   *  --   --   --  89*    < > = values in this interval not displayed.     BMP    Recent Labs     01/19/25  1303 01/20/25  0438   SODIUM 147 152*   K 4.0 3.3*   * 114*   CO2 26 31   AGAP 8 7   BUN 23 23   CREATININE 0.94 0.85   CALCIUM 7.6* 7.4*       Coags    Recent Labs     01/18/25  1637 01/19/25  0926   INR 2.32* 2.30*        Additional Electrolytes  Recent Labs     01/19/25  0441 01/20/25  0438   MG 1.8* 1.9   PHOS 2.7 2.2*    CAIONIZED 1.08*  --           Blood Gas    Recent Labs     01/18/25 2053   PHART 7.566*   NNK3LQZ 23.0*   PO2ART 114.3   JVB6AND 20.4*   BEART -1.2   SOURCE Radial, Left     Recent Labs     01/18/25 2053   SOURCE Radial, Left    LFTs  Recent Labs     01/19/25  1303 01/20/25  0438   ALT 52 93*   * 223*   ALKPHOS 142* 143*   ALB 2.2* 2.1*   TBILI 4.27* 3.52*       Infectious  No recent results  Glucose  Recent Labs     01/19/25  0441 01/19/25  1303 01/20/25  0438   GLUC 159* 164* 153*

## 2025-01-20 NOTE — ASSESSMENT & PLAN NOTE
Melanotic stools 1/18   Admission hemoglobin 10.4 1/17  Hemoglobin trending down 5.9 and 5.6  2 units of PRBC given   Repeat HH 6.1     Plan:    Will continue to monitor hgb q 6 for now  We will transfuse for a hgb < 7  Repeat C-scope hopefully today

## 2025-01-20 NOTE — ASSESSMENT & PLAN NOTE
1/17 Flu B positive     Plan:  Respiratory precautions  Tamiflu 75mg Q 12 hours x 5 days start 1/18 end 1/23   Monitor V/S closely

## 2025-01-20 NOTE — PLAN OF CARE
Problem: Prexisting or High Potential for Compromised Skin Integrity  Goal: Skin integrity is maintained or improved  Description: INTERVENTIONS:  - Identify patients at risk for skin breakdown  - Assess and monitor skin integrity  - Assess and monitor nutrition and hydration status  - Monitor labs   - Assess for incontinence   - Turn and reposition patient  - Assist with mobility/ambulation  - Relieve pressure over bony prominences  - Avoid friction and shearing  - Provide appropriate hygiene as needed including keeping skin clean and dry  - Evaluate need for skin moisturizer/barrier cream  - Collaborate with interdisciplinary team   - Patient/family teaching  - Consider wound care consult   Outcome: Progressing     Problem: PAIN - ADULT  Goal: Verbalizes/displays adequate comfort level or baseline comfort level  Description: Interventions:  - Encourage patient to monitor pain and request assistance  - Assess pain using appropriate pain scale  - Administer analgesics based on type and severity of pain and evaluate response  - Implement non-pharmacological measures as appropriate and evaluate response  - Consider cultural and social influences on pain and pain management  - Notify physician/advanced practitioner if interventions unsuccessful or patient reports new pain  Outcome: Progressing     Problem: INFECTION - ADULT  Goal: Absence or prevention of progression during hospitalization  Description: INTERVENTIONS:  - Assess and monitor for signs and symptoms of infection  - Monitor lab/diagnostic results  - Monitor all insertion sites, i.e. indwelling lines, tubes, and drains  - Monitor endotracheal if appropriate and nasal secretions for changes in amount and color  - [place] appropriate cooling/warming therapies per order  - Administer medications as ordered  - Instruct and encourage patient and family to use good hand hygiene technique  - Identify and instruct in appropriate isolation precautions for  identified infection/condition  Outcome: Progressing  Goal: Absence of fever/infection during neutropenic period  Description: INTERVENTIONS:  - Monitor WBC    Outcome: Progressing     Problem: SAFETY ADULT  Goal: Patient will remain free of falls  Description: INTERVENTIONS:  - Educate patient/family on patient safety including physical limitations  - Instruct patient to call for assistance with activity   - Consult OT/PT to assist with strengthening/mobility   - Keep Call bell within reach  - Keep bed low and locked with side rails adjusted as appropriate  - Keep care items and personal belongings within reach  - Initiate and maintain comfort rounds  - Make Fall Risk Sign visible to staff  - Offer Toileting every 2 Hours, in advance of need  - Initiate/Maintain bed alarm  - Obtain necessary fall risk management equipment: yellow fall bracelet, bed alarm activated, pt unable to use call bell for assistance, 4/4 side-rails raised   - Apply yellow socks and bracelet for high fall risk patients  - Consider moving patient to room near nurses station  Outcome: Progressing  Goal: Maintain or return to baseline ADL function  Description: INTERVENTIONS:  -  Assess patient's ability to carry out ADLs; assess patient's baseline for ADL function and identify physical deficits which impact ability to perform ADLs (bathing, care of mouth/teeth, toileting, grooming, dressing, etc.)  - Assess/evaluate cause of self-care deficits   - Assess range of motion  - Assess patient's mobility; develop plan if impaired  - Assess patient's need for assistive devices and provide as appropriate  - Encourage maximum independence but intervene and supervise when necessary  - Involve family in performance of ADLs  - Assess for home care needs following discharge   - Consider OT consult to assist with ADL evaluation and planning for discharge  - Provide patient education as appropriate  Outcome: Progressing  Goal: Maintains/Returns to pre  admission functional level  Description: INTERVENTIONS:  - Perform AM-PAC 6 Click Basic Mobility/ Daily Activity assessment daily.  - Set and communicate daily mobility goal to care team and patient/family/caregiver.   - Collaborate with rehabilitation services on mobility goals if consulted  - Perform Range of Motion 3 times a day.  - Reposition patient every 2 hours.  - Dangle patient 3 times a day  - Stand patient 3 times a day  - Ambulate patient 3 times a day  - Out of bed to chair 3 times a day   - Out of bed for meals 3 times a day  - Out of bed for toileting  - Record patient progress and toleration of activity level   Outcome: Progressing     Problem: DISCHARGE PLANNING  Goal: Discharge to home or other facility with appropriate resources  Description: INTERVENTIONS:  - Identify barriers to discharge w/patient and caregiver  - Arrange for needed discharge resources and transportation as appropriate  - Identify discharge learning needs (meds, wound care, etc.)  - Arrange for interpretive services to assist at discharge as needed  - Refer to Case Management Department for coordinating discharge planning if the patient needs post-hospital services based on physician/advanced practitioner order or complex needs related to functional status, cognitive ability, or social support system  Outcome: Progressing     Problem: Knowledge Deficit  Goal: Patient/family/caregiver demonstrates understanding of disease process, treatment plan, medications, and discharge instructions  Description: Complete learning assessment and assess knowledge base.  Interventions:  - Provide teaching at level of understanding  - Provide teaching via preferred learning methods  Outcome: Progressing     Problem: SAFETY,RESTRAINT: NV/NON-SELF DESTRUCTIVE BEHAVIOR  Goal: Remains free of harm/injury (restraint for non violent/non self-detsructive behavior)  Description: INTERVENTIONS:  - Instruct patient/family regarding restraint use   -  Assess and monitor physiologic and psychological status   - Provide interventions and comfort measures to meet assessed patient needs   - Identify and implement measures to help patient regain control  - Assess readiness for release of restraint   Outcome: Progressing  Goal: Returns to optimal restraint-free functioning  Description: INTERVENTIONS:  - Assess the patient's behavior and symptoms that indicate continued need for restraint  - Identify and implement measures to help patient regain control  - Assess readiness for release of restraint   Outcome: Progressing     Problem: NEUROSENSORY - ADULT  Goal: Achieves stable or improved neurological status  Description: INTERVENTIONS  - Monitor and report changes in neurological status  - Monitor vital signs such as temperature, blood pressure, glucose, and any other labs ordered   - Initiate measures to prevent increased intracranial pressure  - Monitor for seizure activity and implement precautions if appropriate      Outcome: Progressing  Goal: Remains free of injury related to seizures activity  Description: INTERVENTIONS  - Maintain airway, patient safety  and administer oxygen as ordered  - Monitor patient for seizure activity, document and report duration and description of seizure to physician/advanced practitioner  - If seizure occurs,  ensure patient safety during seizure  - Reorient patient post seizure  - Seizure pads on all 4 side rails  - Instruct patient/family to notify RN of any seizure activity including if an aura is experienced  - Instruct patient/family to call for assistance with activity based on nursing assessment  - Administer anti-seizure medications if ordered    Outcome: Progressing  Goal: Achieves maximal functionality and self care  Description: INTERVENTIONS  - Monitor swallowing and airway patency with patient fatigue and changes in neurological status  - Encourage and assist patient to increase activity and self care.   - Encourage  visually impaired, hearing impaired and aphasic patients to use assistive/communication devices  Outcome: Progressing     Problem: CARDIOVASCULAR - ADULT  Goal: Maintains optimal cardiac output and hemodynamic stability  Description: INTERVENTIONS:  - Monitor I/O, vital signs and rhythm  - Monitor for S/S and trends of decreased cardiac output  - Administer and titrate ordered vasoactive medications to optimize hemodynamic stability  - Assess quality of pulses, skin color and temperature  - Assess for signs of decreased coronary artery perfusion  - Instruct patient to report change in severity of symptoms  Outcome: Progressing  Goal: Absence of cardiac dysrhythmias or at baseline rhythm  Description: INTERVENTIONS:  - Continuous cardiac monitoring, vital signs, obtain 12 lead EKG if ordered  - Administer antiarrhythmic and heart rate control medications as ordered  - Monitor electrolytes and administer replacement therapy as ordered  Outcome: Progressing     Problem: METABOLIC, FLUID AND ELECTROLYTES - ADULT  Goal: Electrolytes maintained within normal limits  Description: INTERVENTIONS:  - Monitor labs and assess patient for signs and symptoms of electrolyte imbalances  - Administer electrolyte replacement as ordered  - Monitor response to electrolyte replacements, including repeat lab results as appropriate  - Instruct patient on fluid and nutrition as appropriate  Outcome: Progressing  Goal: Fluid balance maintained  Description: INTERVENTIONS:  - Monitor labs   - Monitor I/O and WT  - Instruct patient on fluid and nutrition as appropriate  - Assess for signs & symptoms of volume excess or deficit  Outcome: Progressing  Goal: Glucose maintained within target range  Description: INTERVENTIONS:  - Monitor Blood Glucose as ordered  - Assess for signs and symptoms of hyperglycemia and hypoglycemia  - Administer ordered medications to maintain glucose within target range  - Assess nutritional intake and initiate  nutrition service referral as needed  Outcome: Progressing     Problem: HEMATOLOGIC - ADULT  Goal: Maintains hematologic stability  Description: INTERVENTIONS  - Assess for signs and symptoms of bleeding or hemorrhage  - Monitor labs  - Administer supportive blood products/factors as ordered and appropriate  Outcome: Progressing

## 2025-01-20 NOTE — ASSESSMENT & PLAN NOTE
Melanotic stool 1/18   Hemoglobin drop to 5.6 from 10.4    Plan    See assessment and plan for anemia

## 2025-01-20 NOTE — QUICK NOTE
Brief GI Note:    Colonoscopy was done at bedside in the ICU. The procedure was difficult due to significant blood, clots, and looping. Despite manual pressure and multiple patient position changes, cecum could not be reached. Colonoscope could only reach about 70-75 cm (likely proximal transverse colon), and 1 clip was placed at the distal extent of the exam to localize the area on future CT.    There was red blood throughout the entire colon including proximal the to the extent of the exam. The colon was cleaned carefully on the way out without any active bleeding found.    Consent for repeat EGD had been obtained in case the colonoscopy was negative, but this was deferred.    Recommended transfusing 2nd unit pRBC and pursing repeat CT high volume bleeding scan with the next episode of significant hematochezia. Will also let IR know, as embolization would be the next step if she continues to have severe hematochezia.

## 2025-01-20 NOTE — PHYSICAL THERAPY NOTE
PHYSICAL THERAPY NOTE          Patient Name: Bailey Olsen  Today's Date: 1/20/2025 01/20/25 0905   PT Last Visit   PT Visit Date 01/20/25   Note Type   Note type Cancelled Session   Cancel Reasons Medical status   Additional Comments PT consult received. Chart reveiwed and spoke with nursing. Resting .  Pt with increased confusion, abnormal labs and not able appropriate for therapy evaluation at this time. Will monitor progress and complete evaluation as appropriate.     Molly Marrero, PT

## 2025-01-21 LAB
ABO GROUP BLD BPU: NORMAL
ABO GROUP BLD BPU: NORMAL
ALBUMIN SERPL BCG-MCNC: 2 G/DL (ref 3.5–5)
ALP SERPL-CCNC: 144 U/L (ref 34–104)
ALT SERPL W P-5'-P-CCNC: 186 U/L (ref 7–52)
AMMONIA PLAS-SCNC: 34 UMOL/L (ref 18–72)
ANION GAP SERPL CALCULATED.3IONS-SCNC: 1 MMOL/L (ref 4–13)
AST SERPL W P-5'-P-CCNC: 387 U/L (ref 13–39)
BASOPHILS # BLD AUTO: 0.06 THOUSANDS/ΜL (ref 0–0.1)
BASOPHILS NFR BLD AUTO: 1 % (ref 0–1)
BILIRUB SERPL-MCNC: 3.22 MG/DL (ref 0.2–1)
BPU ID: NORMAL
BPU ID: NORMAL
BUN SERPL-MCNC: 23 MG/DL (ref 5–25)
CA-I BLD-SCNC: 1.05 MMOL/L (ref 1.12–1.32)
CALCIUM ALBUM COR SERPL-MCNC: 8.8 MG/DL (ref 8.3–10.1)
CALCIUM SERPL-MCNC: 7.2 MG/DL (ref 8.4–10.2)
CHLORIDE SERPL-SCNC: 113 MMOL/L (ref 96–108)
CO2 SERPL-SCNC: 34 MMOL/L (ref 21–32)
CREAT SERPL-MCNC: 0.76 MG/DL (ref 0.6–1.3)
CROSSMATCH: NORMAL
CROSSMATCH: NORMAL
EOSINOPHIL # BLD AUTO: 0.25 THOUSAND/ΜL (ref 0–0.61)
EOSINOPHIL NFR BLD AUTO: 2 % (ref 0–6)
ERYTHROCYTE [DISTWIDTH] IN BLOOD BY AUTOMATED COUNT: 16.3 % (ref 11.6–15.1)
GFR SERPL CREATININE-BSD FRML MDRD: 88 ML/MIN/1.73SQ M
GLUCOSE SERPL-MCNC: 158 MG/DL (ref 65–140)
HCT VFR BLD AUTO: 22 % (ref 34.8–46.1)
HGB BLD-MCNC: 7.1 G/DL (ref 11.5–15.4)
HGB BLD-MCNC: 7.3 G/DL (ref 11.5–15.4)
HGB BLD-MCNC: 7.5 G/DL (ref 11.5–15.4)
IMM GRANULOCYTES # BLD AUTO: 0.2 THOUSAND/UL (ref 0–0.2)
IMM GRANULOCYTES NFR BLD AUTO: 2 % (ref 0–2)
LYMPHOCYTES # BLD AUTO: 2.28 THOUSANDS/ΜL (ref 0.6–4.47)
LYMPHOCYTES NFR BLD AUTO: 19 % (ref 14–44)
MAGNESIUM SERPL-MCNC: 1.9 MG/DL (ref 1.9–2.7)
MCH RBC QN AUTO: 30.2 PG (ref 26.8–34.3)
MCHC RBC AUTO-ENTMCNC: 33.2 G/DL (ref 31.4–37.4)
MCHC RBC AUTO-ENTMCNC: 33.6 G/DL (ref 31.4–37.4)
MCV RBC AUTO: 91 FL (ref 82–98)
MONOCYTES # BLD AUTO: 0.64 THOUSAND/ΜL (ref 0.17–1.22)
MONOCYTES NFR BLD AUTO: 5 % (ref 4–12)
NEUTROPHILS # BLD AUTO: 8.82 THOUSANDS/ΜL (ref 1.85–7.62)
NEUTS SEG NFR BLD AUTO: 71 % (ref 43–75)
NRBC BLD AUTO-RTO: 2 /100 WBCS
PHOSPHATE SERPL-MCNC: 1.9 MG/DL (ref 2.7–4.5)
PLATELET # BLD AUTO: 67 THOUSANDS/UL (ref 149–390)
PMV BLD AUTO: 11 FL (ref 8.9–12.7)
POTASSIUM SERPL-SCNC: 3.3 MMOL/L (ref 3.5–5.3)
PROT SERPL-MCNC: 4.2 G/DL (ref 6.4–8.4)
RBC # BLD AUTO: 2.42 MILLION/UL (ref 3.81–5.12)
SODIUM SERPL-SCNC: 148 MMOL/L (ref 135–147)
UNIT DISPENSE STATUS: NORMAL
UNIT DISPENSE STATUS: NORMAL
UNIT PRODUCT CODE: NORMAL
UNIT PRODUCT CODE: NORMAL
UNIT PRODUCT VOLUME: 350 ML
UNIT PRODUCT VOLUME: 350 ML
UNIT RH: NORMAL
UNIT RH: NORMAL
WBC # BLD AUTO: 12.25 THOUSAND/UL (ref 4.31–10.16)

## 2025-01-21 PROCEDURE — 82330 ASSAY OF CALCIUM: CPT

## 2025-01-21 PROCEDURE — 97163 PT EVAL HIGH COMPLEX 45 MIN: CPT

## 2025-01-21 PROCEDURE — 97167 OT EVAL HIGH COMPLEX 60 MIN: CPT

## 2025-01-21 PROCEDURE — 84100 ASSAY OF PHOSPHORUS: CPT

## 2025-01-21 PROCEDURE — 80053 COMPREHEN METABOLIC PANEL: CPT

## 2025-01-21 PROCEDURE — 99232 SBSQ HOSP IP/OBS MODERATE 35: CPT | Performed by: INTERNAL MEDICINE

## 2025-01-21 PROCEDURE — 85018 HEMOGLOBIN: CPT | Performed by: NURSE PRACTITIONER

## 2025-01-21 PROCEDURE — 83735 ASSAY OF MAGNESIUM: CPT

## 2025-01-21 PROCEDURE — 85025 COMPLETE CBC W/AUTO DIFF WBC: CPT

## 2025-01-21 PROCEDURE — NC001 PR NO CHARGE

## 2025-01-21 PROCEDURE — 82140 ASSAY OF AMMONIA: CPT

## 2025-01-21 RX ORDER — POTASSIUM CHLORIDE 14.9 MG/ML
20 INJECTION INTRAVENOUS ONCE
Status: COMPLETED | OUTPATIENT
Start: 2025-01-21 | End: 2025-01-21

## 2025-01-21 RX ORDER — ACETAMINOPHEN 325 MG/1
325 TABLET ORAL EVERY 6 HOURS PRN
Status: DISCONTINUED | OUTPATIENT
Start: 2025-01-21 | End: 2025-01-23 | Stop reason: HOSPADM

## 2025-01-21 RX ORDER — OXYCODONE HYDROCHLORIDE 10 MG/1
10 TABLET ORAL EVERY 6 HOURS PRN
Refills: 0 | Status: DISCONTINUED | OUTPATIENT
Start: 2025-01-21 | End: 2025-01-23 | Stop reason: HOSPADM

## 2025-01-21 RX ADMIN — OXYCODONE HYDROCHLORIDE 10 MG: 10 TABLET ORAL at 18:05

## 2025-01-21 RX ADMIN — LACTULOSE 20 G: 20 SOLUTION ORAL at 08:01

## 2025-01-21 RX ADMIN — POTASSIUM CHLORIDE 20 MEQ: 14.9 INJECTION, SOLUTION INTRAVENOUS at 08:34

## 2025-01-21 RX ADMIN — CHLORHEXIDINE GLUCONATE 0.12% ORAL RINSE 15 ML: 1.2 LIQUID ORAL at 19:31

## 2025-01-21 RX ADMIN — Medication 3 MG: at 19:31

## 2025-01-21 RX ADMIN — CEFTRIAXONE 1000 MG: 10 INJECTION, POWDER, FOR SOLUTION INTRAVENOUS at 11:39

## 2025-01-21 RX ADMIN — AMITRIPTYLINE HYDROCHLORIDE 75 MG: 50 TABLET, FILM COATED ORAL at 19:45

## 2025-01-21 RX ADMIN — LACTULOSE 20 G: 20 SOLUTION ORAL at 17:14

## 2025-01-21 RX ADMIN — LACTULOSE 20 G: 20 SOLUTION ORAL at 19:31

## 2025-01-21 RX ADMIN — ACETAMINOPHEN 325 MG: 325 TABLET, FILM COATED ORAL at 18:05

## 2025-01-21 RX ADMIN — Medication 3 MG: at 03:25

## 2025-01-21 RX ADMIN — LIDOCAINE 1 PATCH: 50 PATCH TOPICAL at 08:00

## 2025-01-21 RX ADMIN — CHLORHEXIDINE GLUCONATE 0.12% ORAL RINSE 15 ML: 1.2 LIQUID ORAL at 08:00

## 2025-01-21 RX ADMIN — DEXTROSE 50 ML/HR: 5 SOLUTION INTRAVENOUS at 04:36

## 2025-01-21 RX ADMIN — RIFAXIMIN 550 MG: 550 TABLET ORAL at 19:30

## 2025-01-21 RX ADMIN — RIFAXIMIN 550 MG: 550 TABLET ORAL at 08:00

## 2025-01-21 RX ADMIN — LACTULOSE 20 G: 20 SOLUTION ORAL at 11:39

## 2025-01-21 RX ADMIN — SPIRONOLACTONE 50 MG: 50 TABLET ORAL at 08:00

## 2025-01-21 RX ADMIN — TORSEMIDE 20 MG: 20 TABLET ORAL at 08:00

## 2025-01-21 NOTE — PROGRESS NOTES
Progress Note - Gastroenterology   Name: Bailey Olsen 54 y.o. female I MRN: [id number]  Unit/Bed#: ICU 07 I Date of Admission: 1/18/2025   Date of Service: 1/21/2025 I Hospital Day: 3     Assessment & Plan  Rectal bleeding  54-year-old female presents as a transfer to [facility] for rectal bleeding in the setting of decompensated cirrhosis for which GI is consulted. Patient initially presented to [facility] with AMS c/f HE. she was noted to have rectal bleeding with acute drop in hemoglobin from 10.4 on admission to 5.6. PLTs 108. INR 2.1. BUN normal. EGD in March 2024 showed small nonbleeding EV. Colonoscopy at the same time showed 1 polyp and internal hemorrhoids. Not on AP/AC. Fortunately she remains hemodynamically stable. Per IR, she has a notable large splenorenal shunt with vascular anomalies involving the splenic flexure and descending colon which could represent varices versus angiodysplasia. CT high volume bleeding scan negative for obvious active GI bleeding.     Bedside bidirectional endoscopy performed in the ICU 1/18. EGD showed no active bleeding, no varices; colonoscopy with significant amount of blood clots extending to the descending colon. Since then she has remained hemodynamically stable with ongoing loose dark/clots in stools as visualized on colonoscopy. She has received 5u PRBC so far. Repeat colon 01/20/25 the ascending and cecum were unable to be fully assessed but there blood throughout the entire colon; 1x clip placed at the most proximal extent of the exam.     Plan:  Resume diet  Monitor BMs and Hb. If continued bleeding then would recommend CTA vs bleeding scan to localize the bleed. She may need IR intervention then.  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 Per chart review, patient was previously following with hepatology in [place] ([facility]) but was lost to follow-up in early 2024.  We are unable to view records from this practice, and it does not appear she has established care with hepatology since then.  It is not clear what previous workup has been done regarding her cirrhosis, but the patient denies having a previous liver biopsy. Patient has had 3 recent hospitalizations in September, October, and December of 2024 for recurrent hepatic encephalopathy with falls.  She now presents again with initial concern for HE and has since been transferred to [facility] for rectal bleeding as mentioned above.  Please see below for further management of cirrhosis.  MELD 3.0: 23 at 1/21/2025  6:22 AM  MELD-Na: 20 at 1/21/2025  6:22 AM  Calculated from:  Serum Creatinine: 0.76 mg/dL (Using min of 1 mg/dL) at 1/21/2025  6:22 AM  Serum Sodium: 148 mmol/L (Using max of 137 mmol/L) at 1/21/2025  6:22 AM  Total Bilirubin: 3.22 mg/dL at 1/21/2025  6:22 AM  Serum Albumin: 2 g/dL at 1/21/2025  6:22 AM  INR(ratio): 2.3 at 1/19/2025  9:26 AM  Age at listing (hypothetical): 54 years  Sex: Female at 1/21/2025  6:22 AM    Etiology: Suspected to be from MASLD.  Unclear if patient has had full serologic workup in the past.  This can be obtained as an outpatient.    Transplant Status: Unclear if this has been formally discussed to date since she was last to follow-up from her previous hepatologist in [place].  She will need to establish care with hepatology and follow closely on discharge with discussion regarding transplant candidacy.  Ascites: Reportedly has never required paracentesis and she has never  had clinically significant ascites however she has remained on spironolactone and torsemide for unclear reasons.  Most recent CT from December 2024 showed small volume ascites  Hold diuretics for now  If patient were to undergo paracentesis, replete 6-8 g albumin for every liter removed if > 5 L removed  Hepatic Encephalopathy (Type C): [place] Grade 2-. Able to follow commands however she has overt asterixis and delayed speech. Suspected precipitants include her splenorenal shunt evidenced on CT imaging in addition to her chronic opioid use and current GI bleeding. Also noted to be febrile starting 1/18 so possible infectious source. CXR negative. Bedside ultrasound without significant ascites amendable to paracentesis for fluid analysis.   Complete infectious workup; follow-up blood cultures  Continue lactulose and rifaximin titrated to 3-4 BM/day  Diet: High protein/high calorie diet to prevent catabolic state w/ further ammonia production   Avoid narcotics and other sedating medications  Do not order or trend ammonia level   Varices: EGD March 2024 showed small nonbleeding EV. No prior history of known variceal bleeding. Inpatient bedside EGD 1/18 however showed no varices. See above for further details  NSBB: Not indicated at this time  Continue to monitor for signs of overt bleeding   Hepatoma screening: No suspicious masses seen on recent imaging.   Patient will require q.6-12month dedicated liver imaging for HCC monitoring  Acute anemia  As above   Hepatic encephalopathy (HCC)  As above    Subjective :   NAEO. No additional bleeding. Is hungry and wants to go home.    No other complaints at this time    Objective :  Vitals:    01/21/25 0930   BP: 100/52   Pulse: 101   Resp: 17   Temp: 99.3 °F (37.4 °C)   SpO2: 98%      Physical Exam  Constitutional:       General: She is not in acute distress.     Appearance: Normal appearance.   HENT:      Head: Normocephalic and atraumatic.      Nose: Nose normal.       Mouth/Throat:      Mouth: Mucous membranes are moist.   Eyes:      General: No scleral icterus.     Extraocular Movements: Extraocular movements intact.      Conjunctiva/sclera: Conjunctivae normal.      Pupils: Pupils are equal, round, and reactive to light.   Cardiovascular:      Rate and Rhythm: Normal rate and regular rhythm.   Pulmonary:      Effort: Pulmonary effort is normal.   Abdominal:      General: Abdomen is flat. There is no distension.      Palpations: There is no mass.      Tenderness: There is no abdominal tenderness.   Musculoskeletal:         General: No swelling. Normal range of motion.   Skin:     General: Skin is warm and dry.      Capillary Refill: Capillary refill takes less than 2 seconds.   Neurological:      General: No focal deficit present.      Mental Status: She is alert. She is disoriented.      Motor: Tremor (asterixis) present.   Psychiatric:         Mood and Affect: Mood normal.       Labs and imaging reviewed

## 2025-01-21 NOTE — PROGRESS NOTES
Progress Note - Critical Care/ICU   Name: Bailey Olsen 54 y.o. female I MRN: [id number]  Unit/Bed#: ICU 07 I Date of Admission: 1/18/2025   Date of Service: 1/21/2025 I Hospital Day: 3      Assessment & Plan  Acute anemia  Melanotic stools 1/18   Admission hemoglobin 10.4 1/17  Hemoglobin trending down 5.9 and 5.6  2 units of PRBC given   1/20- S/P 2 units of pRBC  Repeat HH 8.0    Plan:    Will continue to monitor hgb q12hrs  We will transfuse for a hgb < 7          Decompensated HYATT cirrhosis with hepatic encephalopathy and GI bleed  Pt non verbal and moaning   Ammonia level on admission 183 , 1/18 (94)  1/18 underwent EGD and colonoscopy at bedside with prior intubation  No varices  Likely 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; 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  Large clot burden up to descending colon, unable to advance further  1/20- repeat colonoscopy shows no active bleeding except clot in the large in intestine      Plan:  Check daily ammonia levels  Lactulose 20 mg Q 4 hours  Rifaximin 550 mg PO bid   Monitor neuro status   Consider down grading to med/surg          Influenza B  1/17 Flu B positive     Plan:  Respiratory precautions  Tamiflu  75 mg Q 12 hours discontinued  Monitor V/S closely  Chronic, continuous use of opioids  Daily percocet  since 2021   Required large amounts of sedation for intubation for upper endoscopy procedure   Will monitor for withdrawal  Put her on scheduled laxatives      Class 3 severe obesity due to excess calories in adult (HCC)  Morbid obesity BMI 41.6   Would benefit from weight loss    on low calorie diet  Rectal bleeding  Melanotic stool 1/18   Hemoglobin drop to 5.6 from 10.4    Plan    See assessment and plan for anemia    Hepatic encephalopathy (HCC)  Elevated ammonia level on admission, it was down trending today 32.     Plan:    Will continue Rifaximin 550 mg PO BID  Will continue to monitor her neuro status  closely    Disposition: Critical care    ICU Core Measures     A: Assess, Prevent, and Manage Pain Has pain been assessed? Yes  Need for changes to pain regimen? No   B: Both SAT/SAT  N/A   C: Choice of Sedation RASS Goal: -1 Drowsy or +2 Agitated  Need for changes to sedation or analgesia regimen? No   D: Delirium CAM-ICU: Negative   E: Early Mobility  Plan for early mobility? Yes   F: Family Engagement Plan for family engagement today? Yes       Antibiotic Review: Patient on appropriate coverage based on culture data.     Review of Invasive Devices:    Fiore Plan: Continue for accurate I/O monitoring for 48 hours        Prophylaxis:  VTE VTE covered by:    None    Contraindicated secondary to: GI Bleed   Stress Ulcer  not orderedcovered bypantoprazole (PROTONIX) 40 mg tablet [[id number]] (Long-Term Med)         24 Hour Events : Overnight she pulled out her NGT and trial to put back failed. She is taking her meds PO now.  Subjective       Objective :                   Vitals I/O      Most Recent Min/Max in 24hrs   Temp 98.1 °F (36.7 °C) Temp  Min: 97.7 °F (36.5 °C)  Max: 100 °F (37.8 °C)   Pulse 96 Pulse  Min: 89  Max: 126   Resp 14 Resp  Min: 10  Max: 51   /57 BP  Min: 91/42  Max: 158/67   O2 Sat 98 % SpO2  Min: 88 %  Max: 100 %      Intake/Output Summary (Last 24 hours) at 1/21/2025 0712  Last data filed at 1/21/2025 0330  Gross per 24 hour   Intake 3365.82 ml   Output 1565 ml   Net 1800.82 ml       Diet NPO; Sips with meds    Invasive Monitoring           Physical Exam   Physical Exam  Eyes:      Extraocular Movements: Extraocular movements intact.   Skin:     General: Skin is warm.      Capillary Refill: Capillary refill takes less than 2 seconds.   HENT:      Head: Normocephalic and atraumatic.      Nose: No congestion or rhinorrhea.   Cardiovascular:      Rate and Rhythm: Regular rhythm.      Heart sounds: No murmur heard.  Musculoskeletal:         General: No tenderness or signs of injury.   Abdominal:       Palpations: Abdomen is soft.      Tenderness: There is no guarding.   Constitutional:       General: She is not in acute distress.     Appearance: She is well-developed. She is not ill-appearing.   Pulmonary:      Effort: Pulmonary effort is normal. No accessory muscle usage, respiratory distress or accessory muscle usage.      Breath sounds: Normal breath sounds. No wheezing.   Neurological:      General: No focal deficit present.      Mental Status: She is alert and oriented to person, place and time. She is calm.   Genitourinary/Anorectal:  Fiore present.        Diagnostic Studies        Lab Results: I have reviewed the following results:     Medications:  Scheduled PRN   cefTRIAXone, 1,000 mg, Q24H  chlorhexidine, 15 mL, Q12H MIGUEL  lactulose, 20 g, 4x Daily  lidocaine, 1 patch, Daily  melatonin, 3 mg, HS  rifaximin, 550 mg, Q12H MIGUEL  spironolactone, 50 mg, Daily  torsemide, 20 mg, Daily      cyclobenzaprine, 10 mg, TID PRN  HYDROmorphone, 0.5 mg, Q4H PRN  ondansetron, 4 mg, Q6H PRN       Continuous    dextrose, 50 mL/hr, Last Rate: 50 mL/hr (01/21/25 0436)         Labs:   CBC    Recent Labs     01/20/25  0438 01/20/25  1143 01/20/25  2321 01/21/25  0622   WBC 14.04*  --   --  12.25*   HGB 7.2*   < > 8.0* 7.3*   HCT 20.9*  --   --  22.0*   PLT 89*  --   --  67*    < > = values in this interval not displayed.     BMP    Recent Labs     01/20/25 0438 01/21/25  0622   SODIUM 152* 148*   K 3.3* 3.3*   * 113*   CO2 31 34*   AGAP 7 1*   BUN 23 23   CREATININE 0.85 0.76   CALCIUM 7.4* 7.2*       Coags    Recent Labs     01/19/25  0926   INR 2.30*        Additional Electrolytes  Recent Labs     01/20/25 0438 01/21/25  0622   MG 1.9 1.9   PHOS 2.2* 1.9*   CAIONIZED  --  1.05*          Blood Gas    No recent results  No recent results LFTs  Recent Labs     01/20/25 0438 01/21/25  0622   ALT 93* 186*   * 387*   ALKPHOS 143* 144*   ALB 2.1* 2.0*   TBILI 3.52* 3.22*       Infectious  No recent results   Glucose  Recent Labs     01/19/25  1303 01/20/25  0438 01/21/25  0622   GLUC 164* 153* 158*

## 2025-01-21 NOTE — PLAN OF CARE
Problem: PHYSICAL THERAPY ADULT  Goal: Performs mobility at highest level of function for planned discharge setting.  See evaluation for individualized goals.  Description: Treatment/Interventions: Functional transfer training, LE strengthening/ROM, Elevations, Therapeutic exercise, Patient/family training, Equipment eval/education, Bed mobility, Gait training, Compensatory technique education, Continued evaluation, Spoke to nursing, OT  Equipment Recommended: Walker       See flowsheet documentation for full assessment, interventions and recommendations.  Note: Prognosis: Good  Problem List: Decreased strength, Impaired balance, Decreased mobility, Decreased cognition, Impaired judgement, Decreased safety awareness, Obesity  Assessment: Bailey Olsen is a 54 y.o. female admitted to [facility] on 1/18/2025 for Acute anemia. PT was consulted and pt was seen on 1/21/2025 for mobility assessment and d/c planning. Pt presents w droplet isolation, multiple lines, fall risk. Per patient, she is indep at baseline for ADLs and mobility w use of cane. Pt is currently functioning at a S for bed mobility, min-mod A for transfers and min A for steps w RW. Require increased assist to stand dt weakness as well as assist to manage RW during side steps. Assessment of mobility limited secondary to hypotension and safety concerns however pt asymptomatic. Vitals during session 128/60, 105, 100% at rest; 90/50, 105, 95% at EOB; 82/45, 115 supine initially post amb; 100/52, 95, 98% after one minute supine. Pt will benefit from continued skilled IP PT to address the above mentioned impairments in order to maximize recovery and increase functional independence when completing mobility and ADLs. May benefit from RW given functional decline necessitating need for increased support to mobilize.  Barriers to Discharge: Decreased caregiver support, Inaccessible home environment  Barriers to Discharge Comments: functional decline, unclear support  at home  Rehab Resource Intensity Level, PT: II (Moderate Resource Intensity)    See flowsheet documentation for full assessment.

## 2025-01-21 NOTE — PLAN OF CARE
Problem: OCCUPATIONAL THERAPY ADULT  Goal: Performs self-care activities at highest level of function for planned discharge setting.  See evaluation for individualized goals.  Description: Treatment Interventions: ADL retraining, Functional transfer training, UE strengthening/ROM, Endurance training, Patient/family training, Equipment evaluation/education, Neuromuscular reeducation, Compensatory technique education, Energy conservation, Activityengagement          See flowsheet documentation for full assessment, interventions and recommendations.   Note: Limitation: Decreased ADL status, Decreased UE strength, Decreased Safe judgement during ADL, Decreased cognition, Decreased endurance, Decreased self-care trans, Decreased high-level ADLs  Prognosis: Good  Assessment: Bailey Olsen is a 54 y.o. female seen for OT evaluation s/p admit to [facility] on 1/18/2025 w/ Acute anemia.  Comorbidities affecting pt's functional performance at time of assessment include:  liver cirrhosis with recurrent hospitalization for hepatic encephalopathy, chronic back pain and continuous opioid dependence, morbid obesity, pancytopenia, confusion. . Pt with active OT orders and activity orders for Up and OOB as tolerated. Personal factors affecting pt at time of IE include:FABRICIO home environment, difficulty performing ADLs, difficulty performing IADLs, and difficulty performing transfers/mobility. Prior to admission, pt lives with  in a 1 level home with 1 FABRICIO. I with ADLS and mobility with cane. 0 falls. A IADLS. On disability. Does not drive.  Upon evaluation: Pt currently requires Edyta for UB ADLs, modA for LB ADLs, modA for toileting, supervision for bed mobility, modA for functional transfers, and modAx1 RW mobility 2* the following deficits impacting occupational performance:weakness, decreased strength , decreased balance, and decreased activity tolerance.   Pt to benefit from continued skilled OT tx while in the hospital to  address deficits as defined above and maximize level of functional independence w ADL's and functional mobility. Occupational Performance areas to address include: grooming, bathing/shower, toilet hygiene, dressing, functional mobility, and clothing management. From OT standpoint, recommendation at time of d/c would be level 2 resources. OT to follow pt on caseload 3-5x/wk.     Rehab Resource Intensity Level, OT: II (Moderate Resource Intensity)

## 2025-01-21 NOTE — ASSESSMENT & PLAN NOTE
Melanotic stools 1/18   Admission hemoglobin 10.4 1/17  Hemoglobin trending down 5.9 and 5.6  2 units of PRBC given   1/20- S/P 2 units of pRBC  Repeat HH 8.0    Plan:    Will continue to monitor hgb q12hrs  We will transfuse for a hgb < 7

## 2025-01-21 NOTE — ASSESSMENT & PLAN NOTE
Elevated ammonia level on admission, it was down trending today 32.     Plan:    Will continue Rifaximin 550 mg PO BID  Will continue to monitor her neuro status closely

## 2025-01-21 NOTE — ASSESSMENT & PLAN NOTE
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 Per chart review, patient was previously following with hepatology in [place] ([facility]) but was lost to follow-up in early 2024.  We are unable to view records from this practice, and it does not appear she has established care with hepatology since then.  It is not clear what previous workup has been done regarding her cirrhosis, but the patient denies having a previous liver biopsy. Patient has had 3 recent hospitalizations in September, October, and December of 2024 for recurrent hepatic encephalopathy with falls.  She now presents again with initial concern for HE and has since been transferred to [facility] for rectal bleeding as mentioned above.  Please see below for further management of cirrhosis.  MELD 3.0: 23 at 1/21/2025  6:22 AM  MELD-Na: 20 at 1/21/2025  6:22 AM  Calculated from:  Serum Creatinine: 0.76 mg/dL (Using min of 1 mg/dL) at 1/21/2025  6:22 AM  Serum Sodium: 148 mmol/L (Using max of 137 mmol/L) at 1/21/2025  6:22 AM  Total Bilirubin: 3.22 mg/dL at 1/21/2025  6:22 AM  Serum Albumin: 2 g/dL at 1/21/2025  6:22 AM  INR(ratio): 2.3 at 1/19/2025  9:26 AM  Age at listing (hypothetical): 54 years  Sex: Female at 1/21/2025  6:22 AM    Etiology: Suspected to be from MASLD.  Unclear if patient has had full serologic workup in the past.  This can be obtained as an outpatient.    Transplant Status: Unclear if this has been formally discussed to date since she was last to follow-up from her previous hepatologist in [place].  She will need to establish care with hepatology and follow closely on discharge with discussion regarding transplant candidacy.  Ascites: Reportedly has never required paracentesis and she has never had clinically significant ascites however she has remained on spironolactone and torsemide for unclear reasons.  Most recent CT from December 2024 showed small volume  ascites  Hold diuretics for now  If patient were to undergo paracentesis, replete 6-8 g albumin for every liter removed if > 5 L removed  Hepatic Encephalopathy (Type C): [place] Grade 2-. Able to follow commands however she has overt asterixis and delayed speech. Suspected precipitants include her splenorenal shunt evidenced on CT imaging in addition to her chronic opioid use and current GI bleeding. Also noted to be febrile starting 1/18 so possible infectious source. CXR negative. Bedside ultrasound without significant ascites amendable to paracentesis for fluid analysis.   Complete infectious workup; follow-up blood cultures  Continue lactulose and rifaximin titrated to 3-4 BM/day  Diet: High protein/high calorie diet to prevent catabolic state w/ further ammonia production   Avoid narcotics and other sedating medications  Do not order or trend ammonia level   Varices: EGD March 2024 showed small nonbleeding EV. No prior history of known variceal bleeding. Inpatient bedside EGD 1/18 however showed no varices. See above for further details  NSBB: Not indicated at this time  Continue to monitor for signs of overt bleeding   Hepatoma screening: No suspicious masses seen on recent imaging.   Patient will require q.6-12month dedicated liver imaging for HCC monitoring

## 2025-01-21 NOTE — PLAN OF CARE
Problem: Prexisting or High Potential for Compromised Skin Integrity  Goal: Skin integrity is maintained or improved  Description: INTERVENTIONS:  - Identify patients at risk for skin breakdown  - Assess and monitor skin integrity  - Assess and monitor nutrition and hydration status  - Monitor labs   - Assess for incontinence   - Turn and reposition patient  - Assist with mobility/ambulation  - Relieve pressure over bony prominences  - Avoid friction and shearing  - Provide appropriate hygiene as needed including keeping skin clean and dry  - Evaluate need for skin moisturizer/barrier cream  - Collaborate with interdisciplinary team   - Patient/family teaching  - Consider wound care consult   Outcome: Progressing     Problem: PAIN - ADULT  Goal: Verbalizes/displays adequate comfort level or baseline comfort level  Description: Interventions:  - Encourage patient to monitor pain and request assistance  - Assess pain using appropriate pain scale  - Administer analgesics based on type and severity of pain and evaluate response  - Implement non-pharmacological measures as appropriate and evaluate response  - Consider cultural and social influences on pain and pain management  - Notify physician/advanced practitioner if interventions unsuccessful or patient reports new pain  Outcome: Progressing     Problem: INFECTION - ADULT  Goal: Absence or prevention of progression during hospitalization  Description: INTERVENTIONS:  - Assess and monitor for signs and symptoms of infection  - Monitor lab/diagnostic results  - Monitor all insertion sites, i.e. indwelling lines, tubes, and drains  - Monitor endotracheal if appropriate and nasal secretions for changes in amount and color  - [place] appropriate cooling/warming therapies per order  - Administer medications as ordered  - Instruct and encourage patient and family to use good hand hygiene technique  - Identify and instruct in appropriate isolation precautions for  identified infection/condition  Outcome: Progressing  Goal: Absence of fever/infection during neutropenic period  Description: INTERVENTIONS:  - Monitor WBC    Outcome: Progressing     Problem: SAFETY ADULT  Goal: Patient will remain free of falls  Description: INTERVENTIONS:  - Educate patient/family on patient safety including physical limitations  - Instruct patient to call for assistance with activity   - Consult OT/PT to assist with strengthening/mobility   - Keep Call bell within reach  - Keep bed low and locked with side rails adjusted as appropriate  - Keep care items and personal belongings within reach  - Initiate and maintain comfort rounds  - Make Fall Risk Sign visible to staff  - Offer Toileting in advance of need  - Initiate/Maintain bed alarm  - Obtain necessary fall risk management equipment  - Apply yellow socks and bracelet for high fall risk patients  - Consider moving patient to room near nurses station  Outcome: Progressing  Goal: Maintain or return to baseline ADL function  Description: INTERVENTIONS:  -  Assess patient's ability to carry out ADLs; assess patient's baseline for ADL function and identify physical deficits which impact ability to perform ADLs (bathing, care of mouth/teeth, toileting, grooming, dressing, etc.)  - Assess/evaluate cause of self-care deficits   - Assess range of motion  - Assess patient's mobility; develop plan if impaired  - Assess patient's need for assistive devices and provide as appropriate  - Encourage maximum independence but intervene and supervise when necessary  - Involve family in performance of ADLs  - Assess for home care needs following discharge   - Consider OT consult to assist with ADL evaluation and planning for discharge  - Provide patient education as appropriate  Outcome: Progressing     Problem: DISCHARGE PLANNING  Goal: Discharge to home or other facility with appropriate resources  Description: INTERVENTIONS:  - Identify barriers to discharge  w/patient and caregiver  - Arrange for needed discharge resources and transportation as appropriate  - Identify discharge learning needs (meds, wound care, etc.)  - Arrange for interpretive services to assist at discharge as needed  - Refer to Case Management Department for coordinating discharge planning if the patient needs post-hospital services based on physician/advanced practitioner order or complex needs related to functional status, cognitive ability, or social support system  Outcome: Progressing     Problem: Knowledge Deficit  Goal: Patient/family/caregiver demonstrates understanding of disease process, treatment plan, medications, and discharge instructions  Description: Complete learning assessment and assess knowledge base.  Interventions:  - Provide teaching at level of understanding  - Provide teaching via preferred learning methods  Outcome: Progressing     Problem: NEUROSENSORY - ADULT  Goal: Achieves stable or improved neurological status  Description: INTERVENTIONS  - Monitor and report changes in neurological status  - Monitor vital signs such as temperature, blood pressure, glucose, and any other labs ordered   - Initiate measures to prevent increased intracranial pressure  - Monitor for seizure activity and implement precautions if appropriate      Outcome: Progressing  Goal: Remains free of injury related to seizures activity  Description: INTERVENTIONS  - Maintain airway, patient safety  and administer oxygen as ordered  - Monitor patient for seizure activity, document and report duration and description of seizure to physician/advanced practitioner  - If seizure occurs,  ensure patient safety during seizure  - Reorient patient post seizure  - Seizure pads on all 4 side rails  - Instruct patient/family to notify RN of any seizure activity including if an aura is experienced  - Instruct patient/family to call for assistance with activity based on nursing assessment  - Administer anti-seizure  medications if ordered    Outcome: Progressing  Goal: Achieves maximal functionality and self care  Description: INTERVENTIONS  - Monitor swallowing and airway patency with patient fatigue and changes in neurological status  - Encourage and assist patient to increase activity and self care.   - Encourage visually impaired, hearing impaired and aphasic patients to use assistive/communication devices  Outcome: Progressing     Problem: CARDIOVASCULAR - ADULT  Goal: Maintains optimal cardiac output and hemodynamic stability  Description: INTERVENTIONS:  - Monitor I/O, vital signs and rhythm  - Monitor for S/S and trends of decreased cardiac output  - Administer and titrate ordered vasoactive medications to optimize hemodynamic stability  - Assess quality of pulses, skin color and temperature  - Assess for signs of decreased coronary artery perfusion  - Instruct patient to report change in severity of symptoms  Outcome: Progressing  Goal: Absence of cardiac dysrhythmias or at baseline rhythm  Description: INTERVENTIONS:  - Continuous cardiac monitoring, vital signs, obtain 12 lead EKG if ordered  - Administer antiarrhythmic and heart rate control medications as ordered  - Monitor electrolytes and administer replacement therapy as ordered  Outcome: Progressing     Problem: METABOLIC, FLUID AND ELECTROLYTES - ADULT  Goal: Electrolytes maintained within normal limits  Description: INTERVENTIONS:  - Monitor labs and assess patient for signs and symptoms of electrolyte imbalances  - Administer electrolyte replacement as ordered  - Monitor response to electrolyte replacements, including repeat lab results as appropriate  - Instruct patient on fluid and nutrition as appropriate  Outcome: Progressing  Goal: Fluid balance maintained  Description: INTERVENTIONS:  - Monitor labs   - Monitor I/O and WT  - Instruct patient on fluid and nutrition as appropriate  - Assess for signs & symptoms of volume excess or deficit  Outcome:  Progressing  Goal: Glucose maintained within target range  Description: INTERVENTIONS:  - Monitor Blood Glucose as ordered  - Assess for signs and symptoms of hyperglycemia and hypoglycemia  - Administer ordered medications to maintain glucose within target range  - Assess nutritional intake and initiate nutrition service referral as needed  Outcome: Progressing     Problem: Nutrition/Hydration-ADULT  Goal: Nutrient/Hydration intake appropriate for improving, restoring or maintaining nutritional needs  Description: Monitor and assess patient's nutrition/hydration status for malnutrition. Collaborate with interdisciplinary team and initiate plan and interventions as ordered.  Monitor patient's weight and dietary intake as ordered or per policy. Utilize nutrition screening tool and intervene as necessary. Determine patient's food preferences and provide high-protein, high-caloric foods as appropriate.     INTERVENTIONS:  - Monitor oral intake, urinary output, labs, and treatment plans  - Assess nutrition and hydration status and recommend course of action  - Evaluate amount of meals eaten  - Assist patient with eating if necessary   - Allow adequate time for meals  - Recommend/ encourage appropriate diets, oral nutritional supplements, and vitamin/mineral supplements  - Order, calculate, and assess calorie counts as needed  - Recommend, monitor, and adjust tube feedings and TPN/PPN based on assessed needs  - Assess need for intravenous fluids  - Provide specific nutrition/hydration education as appropriate  - Include patient/family/caregiver in decisions related to nutrition  Outcome: Progressing

## 2025-01-21 NOTE — ASSESSMENT & PLAN NOTE
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

## 2025-01-21 NOTE — PLAN OF CARE
Problem: Prexisting or High Potential for Compromised Skin Integrity  Goal: Skin integrity is maintained or improved  Description: INTERVENTIONS:  - Identify patients at risk for skin breakdown  - Assess and monitor skin integrity  - Assess and monitor nutrition and hydration status  - Monitor labs   - Assess for incontinence   - Turn and reposition patient  - Assist with mobility/ambulation  - Relieve pressure over bony prominences  - Avoid friction and shearing  - Provide appropriate hygiene as needed including keeping skin clean and dry  - Evaluate need for skin moisturizer/barrier cream  - Collaborate with interdisciplinary team   - Patient/family teaching  - Consider wound care consult   Outcome: Progressing     Problem: PAIN - ADULT  Goal: Verbalizes/displays adequate comfort level or baseline comfort level  Description: Interventions:  - Encourage patient to monitor pain and request assistance  - Assess pain using appropriate pain scale  - Administer analgesics based on type and severity of pain and evaluate response  - Implement non-pharmacological measures as appropriate and evaluate response  - Consider cultural and social influences on pain and pain management  - Notify physician/advanced practitioner if interventions unsuccessful or patient reports new pain  Outcome: Progressing     Problem: INFECTION - ADULT  Goal: Absence or prevention of progression during hospitalization  Description: INTERVENTIONS:  - Assess and monitor for signs and symptoms of infection  - Monitor lab/diagnostic results  - Monitor all insertion sites, i.e. indwelling lines, tubes, and drains  - Monitor endotracheal if appropriate and nasal secretions for changes in amount and color  - [place] appropriate cooling/warming therapies per order  - Administer medications as ordered  - Instruct and encourage patient and family to use good hand hygiene technique  - Identify and instruct in appropriate isolation precautions for  identified infection/condition  Outcome: Progressing  Goal: Absence of fever/infection during neutropenic period  Description: INTERVENTIONS:  - Monitor WBC    Outcome: Progressing     Problem: SAFETY ADULT  Goal: Patient will remain free of falls  Description: INTERVENTIONS:  - Educate patient/family on patient safety including physical limitations  - Instruct patient to call for assistance with activity   - Consult OT/PT to assist with strengthening/mobility   - Keep Call bell within reach  - Keep bed low and locked with side rails adjusted as appropriate  - Keep care items and personal belongings within reach  - Initiate and maintain comfort rounds  - Make Fall Risk Sign visible to staff  - Offer Toileting every 2 Hours, in advance of need  - Initiate/Maintain bed alarm  - Obtain necessary fall risk management equipment  - Apply yellow socks and bracelet for high fall risk patients  - Consider moving patient to room near nurses station  Outcome: Progressing  Goal: Maintain or return to baseline ADL function  Description: INTERVENTIONS:  -  Assess patient's ability to carry out ADLs; assess patient's baseline for ADL function and identify physical deficits which impact ability to perform ADLs (bathing, care of mouth/teeth, toileting, grooming, dressing, etc.)  - Assess/evaluate cause of self-care deficits   - Assess range of motion  - Assess patient's mobility; develop plan if impaired  - Assess patient's need for assistive devices and provide as appropriate  - Encourage maximum independence but intervene and supervise when necessary  - Involve family in performance of ADLs  - Assess for home care needs following discharge   - Consider OT consult to assist with ADL evaluation and planning for discharge  - Provide patient education as appropriate  Outcome: Progressing  Goal: Maintains/Returns to pre admission functional level  Description: INTERVENTIONS:  - Perform AM-PAC 6 Click Basic Mobility/ Daily Activity  assessment daily.  - Set and communicate daily mobility goal to care team and patient/family/caregiver.   - Collaborate with rehabilitation services on mobility goals if consulted  - Perform Range of Motion 3 times a day.  - Reposition patient every 2 hours.  - Dangle patient 3 times a day  - Stand patient 3 times a day  - Ambulate patient 3 times a day  - Out of bed to chair 3 times a day   - Out of bed for meals 3 times a day  - Out of bed for toileting  - Record patient progress and toleration of activity level   Outcome: Progressing     Problem: DISCHARGE PLANNING  Goal: Discharge to home or other facility with appropriate resources  Description: INTERVENTIONS:  - Identify barriers to discharge w/patient and caregiver  - Arrange for needed discharge resources and transportation as appropriate  - Identify discharge learning needs (meds, wound care, etc.)  - Arrange for interpretive services to assist at discharge as needed  - Refer to Case Management Department for coordinating discharge planning if the patient needs post-hospital services based on physician/advanced practitioner order or complex needs related to functional status, cognitive ability, or social support system  Outcome: Progressing     Problem: Knowledge Deficit  Goal: Patient/family/caregiver demonstrates understanding of disease process, treatment plan, medications, and discharge instructions  Description: Complete learning assessment and assess knowledge base.  Interventions:  - Provide teaching at level of understanding  - Provide teaching via preferred learning methods  Outcome: Progressing     Problem: SAFETY,RESTRAINT: NV/NON-SELF DESTRUCTIVE BEHAVIOR  Goal: Remains free of harm/injury (restraint for non violent/non self-detsructive behavior)  Description: INTERVENTIONS:  - Instruct patient/family regarding restraint use   - Assess and monitor physiologic and psychological status   - Provide interventions and comfort measures to meet  assessed patient needs   - Identify and implement measures to help patient regain control  - Assess readiness for release of restraint   Outcome: Progressing  Goal: Returns to optimal restraint-free functioning  Description: INTERVENTIONS:  - Assess the patient's behavior and symptoms that indicate continued need for restraint  - Identify and implement measures to help patient regain control  - Assess readiness for release of restraint   Outcome: Progressing     Problem: NEUROSENSORY - ADULT  Goal: Achieves stable or improved neurological status  Description: INTERVENTIONS  - Monitor and report changes in neurological status  - Monitor vital signs such as temperature, blood pressure, glucose, and any other labs ordered   - Initiate measures to prevent increased intracranial pressure  - Monitor for seizure activity and implement precautions if appropriate      Outcome: Progressing  Goal: Remains free of injury related to seizures activity  Description: INTERVENTIONS  - Maintain airway, patient safety  and administer oxygen as ordered  - Monitor patient for seizure activity, document and report duration and description of seizure to physician/advanced practitioner  - If seizure occurs,  ensure patient safety during seizure  - Reorient patient post seizure  - Seizure pads on all 4 side rails  - Instruct patient/family to notify RN of any seizure activity including if an aura is experienced  - Instruct patient/family to call for assistance with activity based on nursing assessment  - Administer anti-seizure medications if ordered    Outcome: Progressing  Goal: Achieves maximal functionality and self care  Description: INTERVENTIONS  - Monitor swallowing and airway patency with patient fatigue and changes in neurological status  - Encourage and assist patient to increase activity and self care.   - Encourage visually impaired, hearing impaired and aphasic patients to use assistive/communication devices  Outcome:  Progressing     Problem: CARDIOVASCULAR - ADULT  Goal: Maintains optimal cardiac output and hemodynamic stability  Description: INTERVENTIONS:  - Monitor I/O, vital signs and rhythm  - Monitor for S/S and trends of decreased cardiac output  - Administer and titrate ordered vasoactive medications to optimize hemodynamic stability  - Assess quality of pulses, skin color and temperature  - Assess for signs of decreased coronary artery perfusion  - Instruct patient to report change in severity of symptoms  Outcome: Progressing  Goal: Absence of cardiac dysrhythmias or at baseline rhythm  Description: INTERVENTIONS:  - Continuous cardiac monitoring, vital signs, obtain 12 lead EKG if ordered  - Administer antiarrhythmic and heart rate control medications as ordered  - Monitor electrolytes and administer replacement therapy as ordered  Outcome: Progressing     Problem: METABOLIC, FLUID AND ELECTROLYTES - ADULT  Goal: Electrolytes maintained within normal limits  Description: INTERVENTIONS:  - Monitor labs and assess patient for signs and symptoms of electrolyte imbalances  - Administer electrolyte replacement as ordered  - Monitor response to electrolyte replacements, including repeat lab results as appropriate  - Instruct patient on fluid and nutrition as appropriate  Outcome: Progressing  Goal: Fluid balance maintained  Description: INTERVENTIONS:  - Monitor labs   - Monitor I/O and WT  - Instruct patient on fluid and nutrition as appropriate  - Assess for signs & symptoms of volume excess or deficit  Outcome: Progressing  Goal: Glucose maintained within target range  Description: INTERVENTIONS:  - Monitor Blood Glucose as ordered  - Assess for signs and symptoms of hyperglycemia and hypoglycemia  - Administer ordered medications to maintain glucose within target range  - Assess nutritional intake and initiate nutrition service referral as needed  Outcome: Progressing     Problem: HEMATOLOGIC - ADULT  Goal: Maintains  hematologic stability  Description: INTERVENTIONS  - Assess for signs and symptoms of bleeding or hemorrhage  - Monitor labs  - Administer supportive blood products/factors as ordered and appropriate  Outcome: Progressing     Problem: Nutrition/Hydration-ADULT  Goal: Nutrient/Hydration intake appropriate for improving, restoring or maintaining nutritional needs  Description: Monitor and assess patient's nutrition/hydration status for malnutrition. Collaborate with interdisciplinary team and initiate plan and interventions as ordered.  Monitor patient's weight and dietary intake as ordered or per policy. Utilize nutrition screening tool and intervene as necessary. Determine patient's food preferences and provide high-protein, high-caloric foods as appropriate.     INTERVENTIONS:  - Monitor oral intake, urinary output, labs, and treatment plans  - Assess nutrition and hydration status and recommend course of action  - Evaluate amount of meals eaten  - Assist patient with eating if necessary   - Allow adequate time for meals  - Recommend/ encourage appropriate diets, oral nutritional supplements, and vitamin/mineral supplements  - Order, calculate, and assess calorie counts as needed  - Recommend, monitor, and adjust tube feedings and TPN/PPN based on assessed needs  - Assess need for intravenous fluids  - Provide specific nutrition/hydration education as appropriate  - Include patient/family/caregiver in decisions related to nutrition  Outcome: Progressing

## 2025-01-21 NOTE — ANESTHESIA POSTPROCEDURE EVALUATION
Post-Op Assessment Note    CV Status:  Stable    Pain management: adequate       Mental Status:  Alert, awake and agitated (at baseline)   Hydration Status:  Euvolemic   PONV Controlled:  Controlled   Airway Patency:  Patent  Two or more mitigation strategies used for obstructive sleep apnea   Post Op Vitals Reviewed: Yes    No anethesia notable event occurred.    Staff: Anesthesiologist           Last Filed PACU Vitals:  Vitals Value Taken Time   Temp 98.1 °F (36.7 °C) 01/20/25 1848   Pulse 98 01/20/25 1848   /56 01/20/25 1848   Resp 16 01/20/25 1848   SpO2 94 % 01/20/25 1848       Modified Trista:     Vitals Value Taken Time   Activity 0 01/20/25 1848   Respiration 2 01/20/25 1848   Circulation 2 01/20/25 1848   Consciousness 0 01/20/25 1848   Oxygen Saturation 2 01/20/25 1848     Modified Trista Score: 6

## 2025-01-21 NOTE — PROGRESS NOTES
Progress Note - Critical Care/ICU   Name: Bailey Olsen 54 y.o. female I MRN: [id number]  Unit/Bed#: ICU 07 I Date of Admission: 1/18/2025   Date of Service: 1/21/2025 I Hospital Day: 3       Critical Care Interval Transfer Note:    Brief Hospital Summary:      Patient is a 54-year-old female presenting as a transfer to [facility] for rectal bleeding in the setting of decompensated cirrhosis.  She initially presented to [facility] with altered mental status with concern for hepatic encephalopathy.  She was noted to have rectal bleeding with acute drop in hemoglobin from 10.4 on admission to 5.6.  She is not on any antiplatelets or anticoagulants.  She was admitted to ICU for management.    Patient had a bedside endoscopy on 1/18 which showed no active bleeding or varices.  Colonoscopy showed significant amounts of blood clots extending to the descending colon.  A repeat colonoscopy was done on 1/20, ascending and cecum were unable to be fully assessed but there was blood throughout the entire colon.  1 clip was placed at the most proximal of the exam.  Patient has received a total of 5 packed red blood cells.  She has remained hemodynamically stable since admission.     GI has been following patient. She is cleared to leave ICU and go to med surg floor. She is taking rifaxamin and lactulose for her hepatic encephalopathy. She is now AAO x 3.     Barriers to discharge:   Decompensated cirrhosis  Evaluation for GI bleeding   Hepatic encephalopathy on rifaximin and lactulose      Consults: GI , PT/OT    Recommended to review admission imaging for incidental findings and document in discharge navigator: Chart reviewed, no known incidental findings noted at this time.      Discharge Plan: Anticipate discharge in 48 hrs to home.  Fiore Plan: Fiore to be removed. Order has been placed       Patient seen and evaluated by Critical Care today and deemed to be appropriate for transfer to Med Surg with Telemetry. Spoke to Dr. Orozco  Omer from [facility] to accept transfer. Critical care can be contacted via SecureChat with any questions or concerns. Please use the Critical Care AP Role in Secure Chat for any provider inquires until the patient is transferred out of the ICU or until tomorrow at 0600.

## 2025-01-21 NOTE — PHYSICAL THERAPY NOTE
PHYSICAL THERAPY EVALUATION          Patient Name: Bailey Olsen  Today's Date: 1/21/2025 01/21/25 0927   PT Last Visit   PT Visit Date 01/21/25   Note Type   Note type Evaluation   Pain Assessment   Pain Assessment Tool 0-10   Pain Score No Pain   Restrictions/Precautions   Other Precautions Droplet precautions;Cognitive;Chair Alarm;Bed Alarm;Multiple lines;Telemetry;Fall Risk   Home Living   Type of Home House   Home Layout One level;Stairs to enter without rails   Bathroom Shower/Tub Tub/shower unit   Bathroom Toilet Raised   Bathroom Equipment Grab bars in shower;Shower chair   Home Equipment Cane   Additional Comments 1 FABRICIO   Prior Function   Level of [place] Independent with ADLs;Independent with functional mobility;Needs assistance with IADLS   Lives With Spouse;Daughter   Receives Help From Family   IADLs Family/Friend/Other provides transportation;Family/Friend/Other provides meals;Family/Friend/Other provides medication management   Falls in the last 6 months 0   Vocational On disability   Comments indep w use of cane   General   Additional Pertinent History pt admitted 1/18/25 for acute anemia. PMHx significant for obesity, chronic opioid use, CA, FM   Cognition   Overall Cognitive Status Impaired   Arousal/Participation Cooperative   Attention Attends with cues to redirect   Orientation Level Oriented to person;Oriented to place;Oriented to time  (general to time)   Memory Decreased recall of recent events   Following Commands Follows one step commands without difficulty   Comments flat affect. delayed processing   RLE Assessment   RLE Assessment WFL  (limited by weakness, effort)   LLE Assessment   LLE Assessment WFL  (limited by weakness, effort)   Bed Mobility   Supine to Sit 5  Supervision   Additional items HOB elevated;Increased time required   Sit to Supine 5  Supervision   Additional items Increased time required   Transfers   Sit to Stand 3  Moderate assistance    Additional items Assist x 1;Increased time required;Other;Verbal cues  (RW)   Stand to Sit 4  Minimal assistance   Additional items Assist x 1;Increased time required;Verbal cues;Other  (RW)   Additional Comments cues for direction, safety   Ambulation/Elevation   Gait pattern Short stride;Excessively slow   Gait Assistance 4  Minimal assist   Additional items Assist x 1;Verbal cues  (cues for direction. assist to manage RW)   Assistive Device Rolling walker   Distance 2' side step   Balance   Static Standing Fair -   Dynamic Standing Poor +   Ambulatory Poor +   Endurance Deficit   Endurance Deficit No   Activity Tolerance   Activity Tolerance Treatment limited secondary to medical complications (Comment)  (hypotension; see assessment for details)   Medical Staff Made Aware Shwetha OT   Nurse Made Aware Lorena RN   Assessment   Prognosis Good   Problem List Decreased strength;Impaired balance;Decreased mobility;Decreased cognition;Impaired judgement;Decreased safety awareness;Obesity   Assessment Bailey Olsen is a 54 y.o. female admitted to [facility] on 1/18/2025 for Acute anemia. PT was consulted and pt was seen on 1/21/2025 for mobility assessment and d/c planning. Pt presents w droplet isolation, multiple lines, fall risk. Per patient, she is indep at baseline for ADLs and mobility w use of cane. Pt is currently functioning at a S for bed mobility, min-mod A for transfers and min A for steps w RW. Require increased assist to stand dt weakness as well as assist to manage RW during side steps. Assessment of mobility limited secondary to hypotension and safety concerns however pt asymptomatic. Vitals during session 128/60, 105, 100% at rest; 90/50, 105, 95% at EOB; 82/45, 115 supine initially post amb; 100/52, 95, 98% after one minute supine. Pt will benefit from continued skilled IP PT to address the above mentioned impairments in order to maximize recovery and increase functional independence when completing  mobility and ADLs. May benefit from RW given functional decline necessitating need for increased support to mobilize.   Barriers to Discharge Decreased caregiver support;Inaccessible home environment   Barriers to Discharge Comments functional decline, unclear support at home   Goals   Patient Goals drink something, go home   STG Expiration Date 01/31/25   Short Term Goal #1 1) Pt will perform bed mobility mod I demonstrating appropriate technique 100% of the time in order to improve function. 2) Pt will perform all transfers S demonstrating safe technique 100% of the time in order to improve ability to negotiate safely in home environment. 3) Amb with least restrictive AD > 50'x1 with S in order to demonstrate ability to negotiate in home environment. 4) Improve overall strength and balance 1/2 grade in order to optimize ability to perform functional tasks and reduce fall risk. 5) Improve am-pac by 2 to demonstrate functional progress and return to baseline function/reduced caregiver burden. 6) Negotiate stairs using most appropriate technique and S in order to be able to negotiate safely in home environment.   Plan   Treatment/Interventions Functional transfer training;LE strengthening/ROM;Elevations;Therapeutic exercise;Patient/family training;Equipment eval/education;Bed mobility;Gait training;Compensatory technique education;Continued evaluation;Spoke to nursing;OT   PT Frequency Other (Comment)  (3-4x)   Discharge Recommendation   Rehab Resource Intensity Level, PT II (Moderate Resource Intensity)   Equipment Recommended Walker   Walker Package Recommended Wheeled walker   Change/add to Walker Package? No   AM-PAC Basic Mobility Inpatient   Turning in Flat Bed Without Bedrails 3   Lying on Back to Sitting on Edge of Flat Bed Without Bedrails 3   Moving Bed to Chair 3   Standing Up From Chair Using Arms 2   Walk in Room 3   Climb 3-5 Stairs With Railing 2   Basic Mobility Inpatient Raw Score 16   Basic Mobility  Standardized Score 38.32   [facility] Highest Level Of Mobility   -[facility] Goal 5: Stand one or more mins   -HLM Achieved 5: Stand (1 or more minutes)   History: co - morbidities, social background, use of assistive device, assist for iadl's, current experience including fall risk, multiple lines, droplet isolation  Exam: impairments in systems including multiple body structures involved; musculoskeletal (strength 3- knee ext, elevated BMI), neuromuscular (balance, gait, transfers), cardiopulmonary (vitals), cognition; activity limitations  (difficulties executing an action); participation restrictions (problems associated w involvement in life situations), am-pac  Clinical: unstable/unpredictable  Complexity:high    Juliet Banks, PT

## 2025-01-21 NOTE — ASSESSMENT & PLAN NOTE
1/17 Flu B positive     Plan:  Respiratory precautions  Tamiflu  75 mg Q 12 hours discontinued  Monitor V/S closely

## 2025-01-21 NOTE — ASSESSMENT & PLAN NOTE
54-year-old female presents as a transfer to [facility] for rectal bleeding in the setting of decompensated cirrhosis for which GI is consulted. Patient initially presented to [facility] with AMS c/f HE. she was noted to have rectal bleeding with acute drop in hemoglobin from 10.4 on admission to 5.6. PLTs 108. INR 2.1. BUN normal. EGD in March 2024 showed small nonbleeding EV. Colonoscopy at the same time showed 1 polyp and internal hemorrhoids. Not on AP/AC. Fortunately she remains hemodynamically stable. Per IR, she has a notable large splenorenal shunt with vascular anomalies involving the splenic flexure and descending colon which could represent varices versus angiodysplasia. CT high volume bleeding scan negative for obvious active GI bleeding.     Bedside bidirectional endoscopy performed in the ICU 1/18. EGD showed no active bleeding, no varices; colonoscopy with significant amount of blood clots extending to the descending colon. Since then she has remained hemodynamically stable with ongoing loose dark/clots in stools as visualized on colonoscopy. She has received 5u PRBC so far. Repeat colon 01/20/25 the ascending and cecum were unable to be fully assessed but there blood throughout the entire colon; 1x clip placed at the most proximal extent of the exam.     Plan:  Resume diet  Monitor BMs and Hb. If continued bleeding then would recommend CTA vs bleeding scan to localize the bleed. She may need IR intervention then.  Complete CTX x 5d  IR is consulted in case TIPS is required, however she would be at high risk for worsening encephalopathy given her recurrent HE and significant splenorenal shunt   At least 2x large bore IV access  Blood transfusion per primary team; goal hgb 7-8 (please do not transfuse >8 to prevent increasing portal pressures)  See below for further management of cirrhosis

## 2025-01-21 NOTE — OCCUPATIONAL THERAPY NOTE
Occupational Therapy Evaluation     Patient Name: Bailey Olsen  Today's Date: 1/21/2025  Problem List  Principal Problem:    Acute anemia  Active Problems:    Decompensated HYATT cirrhosis with hepatic encephalopathy and GI bleed    Class 3 severe obesity due to excess calories in adult (HCC)    Hepatic encephalopathy (HCC)    Influenza B    Rectal bleeding    Chronic, continuous use of opioids    Past Medical History  Past Medical History:   Diagnosis Date    Back pain     Carcinoid tumor     neuroendocrine    CHF (congestive heart failure) (HCC)     Cirrhosis of liver (HCC)     Sarcoidosis      Past Surgical History  Past Surgical History:   Procedure Laterality Date    CHOLECYSTECTOMY      GASTRIC BYPASS      HERNIA REPAIR      IR KYPHOPLASTY/VERTEBROPLASTY  10/15/2024           01/21/25 0911   OT Last Visit   OT Visit Date 01/21/25   Note Type   Note type Evaluation   Additional Comments greeted in supine and agreeable.   Pain Assessment   Pain Assessment Tool 0-10   Pain Score No Pain   Restrictions/Precautions   Weight Bearing Precautions Per Order No   Other Precautions Cognitive;Chair Alarm;Bed Alarm;Multiple lines;Telemetry;Fall Risk;Droplet precautions   Home Living   Type of Home House  (r[place])   Home Layout One level  (1 FABRICIO)   Bathroom Shower/Tub Tub/shower unit   Bathroom Toilet Raised   Bathroom Equipment Grab bars in shower;Shower chair;Grab bars around toilet   Home Equipment Cane  (recliner chair)   Prior Function   Level of [place] Independent with ADLs;Independent with functional mobility;Needs assistance with IADLS   Lives With Spouse;Daughter   Receives Help From Family   IADLs Family/Friend/Other provides transportation;Family/Friend/Other provides meals;Family/Friend/Other provides medication management   Falls in the last 6 months 0   Vocational On disability   ADL   Where Assessed Edge of bed   Eating Assistance 5  Supervision/Setup   Grooming Assistance 5  Supervision/Setup   UB  Bathing Assistance 4  Minimal Assistance   LB Bathing Assistance 3  Moderate Assistance   UB Dressing Assistance 4  Minimal Assistance   LB Dressing Assistance 3  Moderate Assistance   Toileting Assistance  3  Moderate Assistance   Bed Mobility   Supine to Sit 5  Supervision   Additional items HOB elevated;Bedrails;Increased time required;Verbal cues   Sit to Supine 5  Supervision   Additional items Increased time required;Verbal cues   Transfers   Sit to Stand 3  Moderate assistance   Additional items Assist x 1;Increased time required;Verbal cues   Stand to Sit 3  Moderate assistance   Additional items Assist x 1;Increased time required;Verbal cues   Additional Comments cues for safety and hand placement.   Functional Mobility   Functional Mobility 3  Moderate assistance   Additional Comments Ax1, RW, lateral side steps   Additional items Rolling walker   Balance   Static Sitting Fair +   Dynamic Sitting Fair   Static Standing Fair -   Dynamic Standing Poor +   Ambulatory Poor +   Activity Tolerance   Activity Tolerance Patient limited by fatigue;Treatment limited secondary to medical complications (Comment)   Medical Staff Made Aware BARRINGTON Chung   Nurse Made Aware Roderick KUMAR Assessment   RUE Assessment WFL   LUE Assessment   LUE Assessment WFL   Psychosocial   Psychosocial (WDL) X   Cognition   Overall Cognitive Status Impaired   Arousal/Participation Cooperative   Attention Attends with cues to redirect   Orientation Level Oriented to person;Oriented to place;Oriented to time  (general time - month and year)   Memory Decreased recall of recent events;Decreased recall of precautions   Following Commands Follows one step commands without difficulty   Assessment   Limitation Decreased ADL status;Decreased UE strength;Decreased Safe judgement during ADL;Decreased cognition;Decreased endurance;Decreased self-care trans;Decreased high-level ADLs   Prognosis Good   Assessment Bailey Olsen is a 54 y.o. female seen  for OT evaluation s/p admit to [facility] on 1/18/2025 w/ Acute anemia.  Comorbidities affecting pt's functional performance at time of assessment include:  liver cirrhosis with recurrent hospitalization for hepatic encephalopathy, chronic back pain and continuous opioid dependence, morbid obesity, pancytopenia, confusion. . Pt with active OT orders and activity orders for Up and OOB as tolerated. Personal factors affecting pt at time of IE include:FABRICIO home environment, difficulty performing ADLs, difficulty performing IADLs, and difficulty performing transfers/mobility. Prior to admission, pt lives with  in a 1 level home with 1 FABRICIO. I with ADLS and mobility with cane. 0 falls. A IADLS. On disability. Does not drive.  Upon evaluation: Pt currently requires Edyta for UB ADLs, modA for LB ADLs, modA for toileting, supervision for bed mobility, modA for functional transfers, and modAx1 RW mobility 2* the following deficits impacting occupational performance:weakness, decreased strength , decreased balance, and decreased activity tolerance.   Pt to benefit from continued skilled OT tx while in the hospital to address deficits as defined above and maximize level of functional independence w ADL's and functional mobility. Occupational Performance areas to address include: grooming, bathing/shower, toilet hygiene, dressing, functional mobility, and clothing management. From OT standpoint, recommendation at time of d/c would be level 2 resources. OT to follow pt on caseload 3-5x/wk.   Goals   Patient Goals to go home.   STG Time Frame 3-5   Short Term Goal #1 Pt will improve activity tolerance to G for min 30 min txment sessions for increase engagement in functional tasks   Short Term Goal #2 Pt will complete UB dressing/self care w/ mod I using adaptive device and DME as needed   Short Term Goal  Pt will complete LB dressing/self care w/ mod I using adaptive device and DME as needed   LTG Time Frame 10-14   Long Term Goal  #1 Pt will complete toileting w/ mod I w/ G hygiene/thoroughness using DME as needed   Long Term Goal #2 Pt will improve functional transfers to Mod I on/off all surfaces using DME as needed w/ G balance/safety   Long Term Goal Pt will improve functional mobility during ADL/IADL/leisure tasks to Mod I using DME as needed w/ G balance/safety   Plan   Treatment Interventions ADL retraining;Functional transfer training;UE strengthening/ROM;Endurance training;Patient/family training;Equipment evaluation/education;Neuromuscular reeducation;Compensatory technique education;Energy conservation;Activityengagement   Goal Expiration Date 02/04/25   OT Treatment Day 0   OT Frequency 3-5x/wk   Discharge Recommendation   Rehab Resource Intensity Level, OT II (Moderate Resource Intensity)   AM-PAC Daily Activity Inpatient   Lower Body Dressing 2   Bathing 3   Toileting 2   Upper Body Dressing 3   Grooming 3   Eating 3   Daily Activity Raw Score 16   Daily Activity Standardized Score (Calc for Raw Score >=11) 35.96   AM-PAC Applied Cognition Inpatient   Following a Speech/Presentation 3   Understanding Ordinary Conversation 3   Taking Medications 2   Remembering Where Things Are Placed or Put Away 2   Remembering List of 4-5 Errands 2   Taking Care of Complicated Tasks 2   Applied Cognition Raw Score 14   Applied Cognition Standardized Score 32.02   Ladi Saunders, OT

## 2025-01-22 LAB
ABO GROUP BLD: NORMAL
ANION GAP SERPL CALCULATED.3IONS-SCNC: 4 MMOL/L (ref 4–13)
BASOPHILS # BLD AUTO: 0.09 THOUSANDS/ΜL (ref 0–0.1)
BASOPHILS NFR BLD AUTO: 1 % (ref 0–1)
BLD GP AB SCN SERPL QL: NEGATIVE
BUN SERPL-MCNC: 20 MG/DL (ref 5–25)
CA-I BLD-SCNC: 0.97 MMOL/L (ref 1.12–1.32)
CALCIUM SERPL-MCNC: 6.9 MG/DL (ref 8.4–10.2)
CHLORIDE SERPL-SCNC: 103 MMOL/L (ref 96–108)
CO2 SERPL-SCNC: 29 MMOL/L (ref 21–32)
CREAT SERPL-MCNC: 0.82 MG/DL (ref 0.6–1.3)
EOSINOPHIL # BLD AUTO: 0.36 THOUSAND/ΜL (ref 0–0.61)
EOSINOPHIL NFR BLD AUTO: 3 % (ref 0–6)
ERYTHROCYTE [DISTWIDTH] IN BLOOD BY AUTOMATED COUNT: 17.1 % (ref 11.6–15.1)
GFR SERPL CREATININE-BSD FRML MDRD: 80 ML/MIN/1.73SQ M
GLUCOSE SERPL-MCNC: 145 MG/DL (ref 65–140)
GLUCOSE SERPL-MCNC: 156 MG/DL (ref 65–140)
GLUCOSE SERPL-MCNC: 158 MG/DL (ref 65–140)
GLUCOSE SERPL-MCNC: 164 MG/DL (ref 65–140)
HCT VFR BLD AUTO: 20.9 % (ref 34.8–46.1)
HCT VFR BLD AUTO: 22.8 % (ref 34.8–46.1)
HGB BLD-MCNC: 6.8 G/DL (ref 11.5–15.4)
HGB BLD-MCNC: 7.5 G/DL (ref 11.5–15.4)
HGB BLD-MCNC: 7.7 G/DL (ref 11.5–15.4)
IMM GRANULOCYTES # BLD AUTO: 0.27 THOUSAND/UL (ref 0–0.2)
IMM GRANULOCYTES NFR BLD AUTO: 3 % (ref 0–2)
LYMPHOCYTES # BLD AUTO: 2.24 THOUSANDS/ΜL (ref 0.6–4.47)
LYMPHOCYTES NFR BLD AUTO: 21 % (ref 14–44)
MAGNESIUM SERPL-MCNC: 1.7 MG/DL (ref 1.9–2.7)
MCH RBC QN AUTO: 30.6 PG (ref 26.8–34.3)
MCHC RBC AUTO-ENTMCNC: 32.5 G/DL (ref 31.4–37.4)
MCV RBC AUTO: 94 FL (ref 82–98)
MONOCYTES # BLD AUTO: 0.73 THOUSAND/ΜL (ref 0.17–1.22)
MONOCYTES NFR BLD AUTO: 7 % (ref 4–12)
NEUTROPHILS # BLD AUTO: 6.98 THOUSANDS/ΜL (ref 1.85–7.62)
NEUTS SEG NFR BLD AUTO: 65 % (ref 43–75)
NRBC BLD AUTO-RTO: 3 /100 WBCS
PHOSPHATE SERPL-MCNC: 1.8 MG/DL (ref 2.7–4.5)
PLATELET # BLD AUTO: 64 THOUSANDS/UL (ref 149–390)
PMV BLD AUTO: 11.1 FL (ref 8.9–12.7)
POTASSIUM SERPL-SCNC: 3.2 MMOL/L (ref 3.5–5.3)
RBC # BLD AUTO: 2.22 MILLION/UL (ref 3.81–5.12)
RH BLD: POSITIVE
SODIUM SERPL-SCNC: 136 MMOL/L (ref 135–147)
SPECIMEN EXPIRATION DATE: NORMAL
WBC # BLD AUTO: 10.67 THOUSAND/UL (ref 4.31–10.16)

## 2025-01-22 PROCEDURE — P9040 RBC LEUKOREDUCED IRRADIATED: HCPCS

## 2025-01-22 PROCEDURE — 85025 COMPLETE CBC W/AUTO DIFF WBC: CPT

## 2025-01-22 PROCEDURE — 85018 HEMOGLOBIN: CPT | Performed by: STUDENT IN AN ORGANIZED HEALTH CARE EDUCATION/TRAINING PROGRAM

## 2025-01-22 PROCEDURE — 85018 HEMOGLOBIN: CPT | Performed by: NURSE PRACTITIONER

## 2025-01-22 PROCEDURE — 86901 BLOOD TYPING SEROLOGIC RH(D): CPT | Performed by: STUDENT IN AN ORGANIZED HEALTH CARE EDUCATION/TRAINING PROGRAM

## 2025-01-22 PROCEDURE — 84100 ASSAY OF PHOSPHORUS: CPT

## 2025-01-22 PROCEDURE — 85014 HEMATOCRIT: CPT | Performed by: STUDENT IN AN ORGANIZED HEALTH CARE EDUCATION/TRAINING PROGRAM

## 2025-01-22 PROCEDURE — 99233 SBSQ HOSP IP/OBS HIGH 50: CPT | Performed by: STUDENT IN AN ORGANIZED HEALTH CARE EDUCATION/TRAINING PROGRAM

## 2025-01-22 PROCEDURE — 86923 COMPATIBILITY TEST ELECTRIC: CPT

## 2025-01-22 PROCEDURE — 86900 BLOOD TYPING SEROLOGIC ABO: CPT | Performed by: STUDENT IN AN ORGANIZED HEALTH CARE EDUCATION/TRAINING PROGRAM

## 2025-01-22 PROCEDURE — 82330 ASSAY OF CALCIUM: CPT

## 2025-01-22 PROCEDURE — 82948 REAGENT STRIP/BLOOD GLUCOSE: CPT

## 2025-01-22 PROCEDURE — 80048 BASIC METABOLIC PNL TOTAL CA: CPT

## 2025-01-22 PROCEDURE — 86850 RBC ANTIBODY SCREEN: CPT | Performed by: STUDENT IN AN ORGANIZED HEALTH CARE EDUCATION/TRAINING PROGRAM

## 2025-01-22 PROCEDURE — 83735 ASSAY OF MAGNESIUM: CPT

## 2025-01-22 PROCEDURE — 99232 SBSQ HOSP IP/OBS MODERATE 35: CPT | Performed by: INTERNAL MEDICINE

## 2025-01-22 RX ORDER — SERTRALINE HYDROCHLORIDE 100 MG/1
100 TABLET, FILM COATED ORAL DAILY
Status: DISCONTINUED | OUTPATIENT
Start: 2025-01-23 | End: 2025-01-23 | Stop reason: HOSPADM

## 2025-01-22 RX ORDER — CARVEDILOL 3.12 MG/1
3.12 TABLET ORAL 2 TIMES DAILY WITH MEALS
Status: DISCONTINUED | OUTPATIENT
Start: 2025-01-22 | End: 2025-01-23 | Stop reason: HOSPADM

## 2025-01-22 RX ADMIN — OXYCODONE HYDROCHLORIDE 10 MG: 10 TABLET ORAL at 06:21

## 2025-01-22 RX ADMIN — SPIRONOLACTONE 50 MG: 50 TABLET ORAL at 09:15

## 2025-01-22 RX ADMIN — OXYCODONE HYDROCHLORIDE 10 MG: 10 TABLET ORAL at 20:57

## 2025-01-22 RX ADMIN — ACETAMINOPHEN 325 MG: 325 TABLET, FILM COATED ORAL at 20:57

## 2025-01-22 RX ADMIN — TORSEMIDE 20 MG: 20 TABLET ORAL at 09:14

## 2025-01-22 RX ADMIN — AMITRIPTYLINE HYDROCHLORIDE 75 MG: 50 TABLET, FILM COATED ORAL at 21:00

## 2025-01-22 RX ADMIN — CEFTRIAXONE 1000 MG: 10 INJECTION, POWDER, FOR SOLUTION INTRAVENOUS at 13:06

## 2025-01-22 RX ADMIN — CHLORHEXIDINE GLUCONATE 0.12% ORAL RINSE 15 ML: 1.2 LIQUID ORAL at 09:30

## 2025-01-22 RX ADMIN — CYCLOBENZAPRINE 10 MG: 10 TABLET, FILM COATED ORAL at 13:49

## 2025-01-22 RX ADMIN — RIFAXIMIN 550 MG: 550 TABLET ORAL at 20:57

## 2025-01-22 RX ADMIN — LIDOCAINE 1 PATCH: 50 PATCH TOPICAL at 09:15

## 2025-01-22 RX ADMIN — LACTULOSE 20 G: 20 SOLUTION ORAL at 17:28

## 2025-01-22 RX ADMIN — CARVEDILOL 3.12 MG: 3.12 TABLET, FILM COATED ORAL at 17:28

## 2025-01-22 RX ADMIN — ACETAMINOPHEN 325 MG: 325 TABLET, FILM COATED ORAL at 13:49

## 2025-01-22 RX ADMIN — Medication 3 MG: at 21:00

## 2025-01-22 RX ADMIN — CHLORHEXIDINE GLUCONATE 0.12% ORAL RINSE 15 ML: 1.2 LIQUID ORAL at 20:57

## 2025-01-22 RX ADMIN — LACTULOSE 20 G: 20 SOLUTION ORAL at 12:08

## 2025-01-22 RX ADMIN — RIFAXIMIN 550 MG: 550 TABLET ORAL at 09:14

## 2025-01-22 RX ADMIN — CYCLOBENZAPRINE 10 MG: 10 TABLET, FILM COATED ORAL at 20:57

## 2025-01-22 RX ADMIN — LACTULOSE 20 G: 20 SOLUTION ORAL at 09:15

## 2025-01-22 RX ADMIN — LACTULOSE 20 G: 20 SOLUTION ORAL at 21:00

## 2025-01-22 RX ADMIN — OXYCODONE HYDROCHLORIDE 10 MG: 10 TABLET ORAL at 13:49

## 2025-01-22 NOTE — ASSESSMENT & PLAN NOTE
History of HYATT cirrhosis  Tolerating rifaximin, lactulose  Recent EGD without evidence of varices  Continue carvedilol, torsemide and Aldactone

## 2025-01-22 NOTE — PROGRESS NOTES
Progress Note - Hospitalist   Name: Bailey Olsen 54 y.o. female I MRN: [id number]  Unit/Bed#: [place] [address] -02 I Date of Admission: 1/18/2025   Date of Service: 1/22/2025 I Hospital Day: 4     Assessment & Plan  Acute anemia  54-year-old female with PMH of HYATT cirrhosis presented with altered mental status  Initially admitted at [facility] on 01/17 with concerns of hepatic encephalopathy  Transferred to [facility] critical care service for rectal bleeding  Initial hemoglobin 10.4 on 1/17, 5.4 on 1/20 requiring 2 units PRBC  EGD 01/18 with no acute findings or varices.  Octreotide and PPI discontinued  Colonoscopy 01/18, 01/20 had poor prep and difficult procedure due to significant blood clots and looping.  Fresh blood noted  Completed 5 days of ceftriaxone  Fortunately patient's bleeding had stopped, hemoglobin had been stable  S/p 5 units PRBC transfused total.  Hemoglobin 6.8 today, plan to transfuse 1 more unit  GI following, diet advance.  Maintain H&H greater than 7 but less than 8  If further bleeding noted, will need high-volume CT bleeding scan or CTA as patient may be a candidate for embolization  Decompensated HYATT cirrhosis with hepatic encephalopathy and GI bleed  History of HYATT cirrhosis  Tolerating rifaximin, lactulose  Recent EGD without evidence of varices  Continue carvedilol, torsemide and Aldactone  Class 3 severe obesity due to excess calories in adult (HCC)  BMI noted to be 45  Would benefit from weight loss, dietary changes and exercise  Hepatic encephalopathy (HCC)  Did have elevated ammonia levels on admission  Tolerating lactulose 4 times daily  Continue rifaximin  Influenza B  Tested positive for influenza B on January 17  Completed Tamiflu course  Rectal bleeding  Please review above in acute anemia  Chronic, continuous use of opioids  PDMP reviewed  Continue pain meds as needed    VTE Pharmacologic Prophylaxis:   Pharmacologic:  none, anemia  Mechanical VTE Prophylaxis in Place:  Yes    Current Length of Stay: 4 day(s)    Current Patient Status: Inpatient   Certification Statement: The patient will continue to require additional inpatient hospital stay due to monitor H/H, blood transfusion    Discharge Plan: 48 hours    Code Status: Level 1 - Full Code      Subjective:   Patient reports doing well today.  Reportedly had a normal color stool.  No further bleeding reported.  Tolerating diet without nausea or vomiting.    Objective:     Vitals:   Temp (24hrs), Av.3 °F (36.8 °C), Min:97.2 °F (36.2 °C), Max:99.9 °F (37.7 °C)    Temp:  [97.2 °F (36.2 °C)-99.9 °F (37.7 °C)] 98.9 °F (37.2 °C)  HR:  [105-116] 111  Resp:  [13-28] 20  BP: ()/(53-94) 116/68  SpO2:  [95 %-100 %] 95 %  Body mass index is 44.57 kg/m².     Input and Output Summary (last 24 hours):       Intake/Output Summary (Last 24 hours) at 2025 1539  Last data filed at 2025 1352  Gross per 24 hour   Intake 1160 ml   Output 550 ml   Net 610 ml       Physical Exam:     Physical Exam  Vitals and nursing note reviewed.   Constitutional:       Appearance: She is obese.   HENT:      Head: Normocephalic.   Eyes:      Conjunctiva/sclera: Conjunctivae normal.   Cardiovascular:      Rate and Rhythm: Normal rate.   Pulmonary:      Effort: Pulmonary effort is normal.      Breath sounds: No wheezing.   Abdominal:      General: Bowel sounds are normal. There is no distension.      Palpations: Abdomen is soft.   Musculoskeletal:         General: No swelling. Normal range of motion.   Skin:     General: Skin is warm.      Coloration: Skin is pale.   Neurological:      General: No focal deficit present.      Mental Status: She is alert. Mental status is at baseline.         Additional Data:     Labs:    Results from last 7 days   Lab Units 25  0604   WBC Thousand/uL 10.67*   HEMOGLOBIN g/dL 6.8*   HEMATOCRIT % 20.9*   PLATELETS Thousands/uL 64*   SEGS PCT % 65   LYMPHO PCT % 21   MONO PCT % 7   EOS PCT % 3     Results from last  7 days   Lab Units 01/22/25  0604 01/21/25  0622   SODIUM mmol/L 136 148*   POTASSIUM mmol/L 3.2* 3.3*   CHLORIDE mmol/L 103 113*   CO2 mmol/L 29 34*   BUN mg/dL 20 23   CREATININE mg/dL 0.82 0.76   ANION GAP mmol/L 4 1*   CALCIUM mg/dL 6.9* 7.2*   ALBUMIN g/dL  --  2.0*   TOTAL BILIRUBIN mg/dL  --  3.22*   ALK PHOS U/L  --  144*   ALT U/L  --  186*   AST U/L  --  387*   GLUCOSE RANDOM mg/dL 156* 158*     Results from last 7 days   Lab Units 01/19/25  0926   INR  2.30*     Results from last 7 days   Lab Units 01/22/25  1125 01/22/25  0633 01/17/25  1434   POC GLUCOSE mg/dl 145* 158* 102         Results from last 7 days   Lab Units 01/18/25  0858 01/18/25  0704 01/17/25  1251 01/17/25  0954   LACTIC ACID mmol/L 3.5* 3.6* 2.5* 2.2*           * I Have Reviewed All Lab Data Listed Above.  * Additional Pertinent Lab Tests Reviewed: All Labs For Current Hospital Admission Reviewed    Mobility:  Basic Mobility Inpatient Raw Score: 16  [facility] Goal: 5: Stand one or more mins  -[facility] Achieved: 3: Sit at edge of bed    Lines:     Invasive Devices       Long-dwell Peripherally Inserted Midline IV Catheter  Duration             Long-Dwell Peripheral IV (Midline) 01/18/25 Right Brachial 4 days                       Imaging:    Imaging Reports Reviewed Today Include:     Colonoscopy  Result Date: 1/20/2025  Impression: The procedure was difficult due to significant blood, clots, and looping. Despite manual pressure and multiple patient position changes, cecum could not be reached. Colonoscope could only reach about 70-75 cm (likely proximal transverse colon), and 1 clip was placed at the distal extent of the exam to localize the area on future CT. Significant amount of pancolonic fresh blood and blood clotting RECOMMENDATION: Schedule repeat colonoscopy, due: 1/20/2026 Incomplete procedure  Recommended transfusing 2nd unit pRBC. Transfuse for goal Hgb > 7 or active bleeding. Repeat CT high volume bleeding scan with the next  episode of significant hematochezia. Will also let IR know, as embolization would be the next step if she continues to have severe hematochezia. Repeat EGD deferred given colonoscopy findings and normal EGD yesterday.    Anselmo Douglas MD     XR chest portable ICU  Result Date: 1/19/2025  Impression: Mild left parahilar groundglass opacity may represent pneumonia. Workstation performed: [id number]     CT head wo contrast  Result Date: 1/19/2025  Impression: No acute intracranial abnormality. There is motion artifact which limits evaluation of the skull base and mandible region. Resident: SHELBY ALCANTAR I, the attending radiologist, have reviewed the images and agree with the final report above. Workstation performed: [id number]     XR chest portable ICU  Result Date: 1/19/2025  Impression: No acute cardiopulmonary disease. ET tube 4 cm above the myra. Workstation performed: [id number]     Colonoscopy  Result Date: 1/18/2025  Impression: Significant amount of blood clotting in the descending colon, sigmoid colon, rectosigmoid and rectum 55 cm from the anal verge, which obscured visualization No bright red blood was visualized in the examined portion of the colon. RECOMMENDATION: Schedule repeat colonoscopy, due: 1/20/2025 Inadequate bowel preparation  Keep patient NPO. Given altered mental status, plan to administer bowel preparation via OG tube (begin prep today).  If patient remains stable, then plan for colonoscopy on Monday 1/20.  If unstable, may need colonoscopy sooner. Continue Ceftriaxone. Monitor Hgb.      Dr. Mary Delgado MD    EGD  Result Date: 1/18/2025  Impression: The upper third of the esophagus and middle third of the esophagus appeared normal. No obvious esophageal varices were seen. 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 There was no potential source of  bleeding, no bleeding lesion or stigmata of recent bleeding on this exam.   There was some clot present in the esophagus that was lavaged and completely cleared to obtain good views of the esophagus (this was bleeding from an oral laceration sustained during endotracheal intubation). RECOMMENDATION: Proceed with colonoscopy Continue Ceftriaxone for prophylaxis of SBP. Can discontinue Protonix and Octreotide. Continue to monitor hemoglobin.    Mary Delgado MD     CT high volume bleeding scan abdomen pelvis  Result Date: 1/18/2025  Impression: No CT evidence of active high-volume gastrointestinal hemorrhage. Cirrhotic morphology with splenomegaly and trace ascites Markedly dilated varices extending from the splenic vein to the gonadal vein Workstation performed: [id number]         Recent Cultures (last 7 days):     Results from last 7 days   Lab Units 01/19/25  1302 01/19/25  1107   BLOOD CULTURE  No Growth at 48 hrs. No Growth at 72 hrs.       Last 24 Hours Medication List:   Current Facility-Administered Medications   Medication Dose Route Frequency Provider Last Rate    oxyCODONE  10 mg Oral Q6H PRN Maxine Price PA-C      And    acetaminophen  325 mg Oral Q6H PRN Maxine Price PA-C      amitriptyline  75 mg Oral HS Garry Jennings PA-C      carvedilol  3.125 mg Oral BID With Meals Piyush Arora MD      cefTRIAXone  1,000 mg Intravenous Q24H Maxine Price PA-C 1,000 mg (01/22/25 1306)    chlorhexidine  15 mL Swish & Spit Q12H [facility] Maxine Price PA-C      cyclobenzaprine  10 mg Oral TID PRN Maxine Price PA-C      lactulose  20 g Oral 4x Daily Maxine Price PA-C      lidocaine  1 patch Topical Daily Maxine Price PA-C      melatonin  3 mg Oral HS Maxine Price PA-C      ondansetron  4 mg Intravenous Q6H PRN Maxine Price PA-C      rifaximin  550 mg Oral Q12H [facility] Maxine Price PA-C      [START ON 1/23/2025] sertraline  100 mg Oral Daily Piyush Arora MD      spironolactone  50 mg Oral Daily Maxine Price PA-C       torsemide  20 mg Oral Daily Maxine Price PA-C          Today, Patient Was Seen By: Piyush Arora MD    ** Please Note: Dictation voice to text software may have been used in the creation of this document. **

## 2025-01-22 NOTE — ASSESSMENT & PLAN NOTE
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 Per chart review, patient was previously following with hepatology in [place] ([facility]) but was lost to follow-up in early 2024.  We are unable to view records from this practice, and it does not appear she has established care with hepatology since then.  It is not clear what previous workup has been done regarding her cirrhosis, but the patient denies having a previous liver biopsy. Patient has had 3 recent hospitalizations in September, October, and December of 2024 for recurrent hepatic encephalopathy with falls.  She now presents again with initial concern for HE and has since been transferred to [facility] for rectal bleeding as mentioned above.  Please see below for further management of cirrhosis.  MELD 3.0: 23 at 1/21/2025  6:22 AM  MELD-Na: 20 at 1/21/2025  6:22 AM  Calculated from:  Serum Creatinine: 0.76 mg/dL (Using min of 1 mg/dL) at 1/21/2025  6:22 AM  Serum Sodium: 148 mmol/L (Using max of 137 mmol/L) at 1/21/2025  6:22 AM  Total Bilirubin: 3.22 mg/dL at 1/21/2025  6:22 AM  Serum Albumin: 2 g/dL at 1/21/2025  6:22 AM  INR(ratio): 2.3 at 1/19/2025  9:26 AM  Age at listing (hypothetical): 54 years  Sex: Female at 1/21/2025  6:22 AM    Etiology: Suspected to be from MASLD.  Unclear if patient has had full serologic workup in the past.  This can be obtained as an outpatient.    Transplant Status: Unclear if this has been formally discussed to date since she was last to follow-up from her previous hepatologist in [place].  She will need to establish care with hepatology and follow closely on discharge with discussion regarding transplant candidacy.  Ascites: Reportedly has never required paracentesis and she has never had clinically significant ascites however she has remained on spironolactone and torsemide for unclear reasons.  Most recent CT from December 2024 showed small volume  ascites  She is on her home torsemide and anthony  If patient were to undergo paracentesis, replete 6-8 g albumin for every liter removed if > 5 L removed  Hepatic Encephalopathy (Type C): [place] Grade 2 -. Able to follow commands however she has overt asterixis and delayed/slurred speech. Suspected precipitants include her splenorenal shunt evidenced on CT imaging in addition to her chronic opioid use and current GI bleeding. Also noted to be febrile starting 1/18 so possible infectious source. CXR negative. Bedside ultrasound without significant ascites amendable to paracentesis for fluid analysis.   Complete infectious workup; follow-up blood cultures  Continue lactulose and rifaximin titrated to 3-4 BM/day  Diet: High protein/high calorie diet to prevent catabolic state w/ further ammonia production   Avoid narcotics and other sedating medications  Do not order or trend ammonia level   Varices: EGD March 2024 showed small nonbleeding EV. No prior history of known variceal bleeding. Inpatient bedside EGD 1/18 however showed no varices. See above for further details  NSBB: Not indicated at this time  Continue to monitor for signs of overt bleeding   Hepatoma screening: No suspicious masses seen on recent imaging.   Patient will require q.6-12month dedicated liver imaging for HCC monitoring

## 2025-01-22 NOTE — PLAN OF CARE
Problem: Prexisting or High Potential for Compromised Skin Integrity  Goal: Skin integrity is maintained or improved  Description: INTERVENTIONS:  - Identify patients at risk for skin breakdown  - Assess and monitor skin integrity  - Assess and monitor nutrition and hydration status  - Monitor labs   - Assess for incontinence   - Turn and reposition patient  - Assist with mobility/ambulation  - Relieve pressure over bony prominences  - Avoid friction and shearing  - Provide appropriate hygiene as needed including keeping skin clean and dry  - Evaluate need for skin moisturizer/barrier cream  - Collaborate with interdisciplinary team   - Patient/family teaching  - Consider wound care consult   Outcome: Progressing     Problem: PAIN - ADULT  Goal: Verbalizes/displays adequate comfort level or baseline comfort level  Description: Interventions:  - Encourage patient to monitor pain and request assistance  - Assess pain using appropriate pain scale  - Administer analgesics based on type and severity of pain and evaluate response  - Implement non-pharmacological measures as appropriate and evaluate response  - Consider cultural and social influences on pain and pain management  - Notify physician/advanced practitioner if interventions unsuccessful or patient reports new pain  Outcome: Progressing     Problem: INFECTION - ADULT  Goal: Absence or prevention of progression during hospitalization  Description: INTERVENTIONS:  - Assess and monitor for signs and symptoms of infection  - Monitor lab/diagnostic results  - Monitor all insertion sites, i.e. indwelling lines, tubes, and drains  - Monitor endotracheal if appropriate and nasal secretions for changes in amount and color  - [place] appropriate cooling/warming therapies per order  - Administer medications as ordered  - Instruct and encourage patient and family to use good hand hygiene technique  - Identify and instruct in appropriate isolation precautions for  identified infection/condition  Outcome: Progressing  Goal: Absence of fever/infection during neutropenic period  Description: INTERVENTIONS:  - Monitor WBC    Outcome: Progressing     Problem: SAFETY ADULT  Goal: Patient will remain free of falls  Description: INTERVENTIONS:  - Educate patient/family on patient safety including physical limitations  - Instruct patient to call for assistance with activity   - Consult OT/PT to assist with strengthening/mobility   - Keep Call bell within reach  - Keep bed low and locked with side rails adjusted as appropriate  - Keep care items and personal belongings within reach  - Initiate and maintain comfort rounds  - Make Fall Risk Sign visible to staff  - Offer Toileting every 2 Hours, in advance of need  - Initiate/Maintain bed alarm  - Obtain necessary fall risk management equipment: yellow socks  - Apply yellow socks and bracelet for high fall risk patients  - Consider moving patient to room near nurses station  Outcome: Progressing  Goal: Maintain or return to baseline ADL function  Description: INTERVENTIONS:  -  Assess patient's ability to carry out ADLs; assess patient's baseline for ADL function and identify physical deficits which impact ability to perform ADLs (bathing, care of mouth/teeth, toileting, grooming, dressing, etc.)  - Assess/evaluate cause of self-care deficits   - Assess range of motion  - Assess patient's mobility; develop plan if impaired  - Assess patient's need for assistive devices and provide as appropriate  - Encourage maximum independence but intervene and supervise when necessary  - Involve family in performance of ADLs  - Assess for home care needs following discharge   - Consider OT consult to assist with ADL evaluation and planning for discharge  - Provide patient education as appropriate  Outcome: Progressing  Goal: Maintains/Returns to pre admission functional level  Description: INTERVENTIONS:  - Perform AM-PAC 6 Click Basic Mobility/ Daily  Activity assessment daily.  - Set and communicate daily mobility goal to care team and patient/family/caregiver.   - Collaborate with rehabilitation services on mobility goals if consulted  - Perform Range of Motion 3 times a day.  - Reposition patient every 2 hours.  - Dangle patient 3 times a day  - Stand patient 3 times a day  - Ambulate patient 3 times a day  - Out of bed to chair 3 times a day   - Out of bed for meals 3 times a day  - Out of bed for toileting  - Record patient progress and toleration of activity level   Outcome: Progressing     Problem: DISCHARGE PLANNING  Goal: Discharge to home or other facility with appropriate resources  Description: INTERVENTIONS:  - Identify barriers to discharge w/patient and caregiver  - Arrange for needed discharge resources and transportation as appropriate  - Identify discharge learning needs (meds, wound care, etc.)  - Arrange for interpretive services to assist at discharge as needed  - Refer to Case Management Department for coordinating discharge planning if the patient needs post-hospital services based on physician/advanced practitioner order or complex needs related to functional status, cognitive ability, or social support system  Outcome: Progressing     Problem: Knowledge Deficit  Goal: Patient/family/caregiver demonstrates understanding of disease process, treatment plan, medications, and discharge instructions  Description: Complete learning assessment and assess knowledge base.  Interventions:  - Provide teaching at level of understanding  - Provide teaching via preferred learning methods  Outcome: Progressing     Problem: SAFETY,RESTRAINT: NV/NON-SELF DESTRUCTIVE BEHAVIOR  Goal: Remains free of harm/injury (restraint for non violent/non self-detsructive behavior)  Description: INTERVENTIONS:  - Instruct patient/family regarding restraint use   - Assess and monitor physiologic and psychological status   - Provide interventions and comfort measures to  meet assessed patient needs   - Identify and implement measures to help patient regain control  - Assess readiness for release of restraint   Outcome: Progressing  Goal: Returns to optimal restraint-free functioning  Description: INTERVENTIONS:  - Assess the patient's behavior and symptoms that indicate continued need for restraint  - Identify and implement measures to help patient regain control  - Assess readiness for release of restraint   Outcome: Progressing     Problem: NEUROSENSORY - ADULT  Goal: Achieves stable or improved neurological status  Description: INTERVENTIONS  - Monitor and report changes in neurological status  - Monitor vital signs such as temperature, blood pressure, glucose, and any other labs ordered   - Initiate measures to prevent increased intracranial pressure  - Monitor for seizure activity and implement precautions if appropriate      Outcome: Progressing  Goal: Remains free of injury related to seizures activity  Description: INTERVENTIONS  - Maintain airway, patient safety  and administer oxygen as ordered  - Monitor patient for seizure activity, document and report duration and description of seizure to physician/advanced practitioner  - If seizure occurs,  ensure patient safety during seizure  - Reorient patient post seizure  - Seizure pads on all 4 side rails  - Instruct patient/family to notify RN of any seizure activity including if an aura is experienced  - Instruct patient/family to call for assistance with activity based on nursing assessment  - Administer anti-seizure medications if ordered    Outcome: Progressing  Goal: Achieves maximal functionality and self care  Description: INTERVENTIONS  - Monitor swallowing and airway patency with patient fatigue and changes in neurological status  - Encourage and assist patient to increase activity and self care.   - Encourage visually impaired, hearing impaired and aphasic patients to use assistive/communication devices  Outcome:  Progressing     Problem: CARDIOVASCULAR - ADULT  Goal: Maintains optimal cardiac output and hemodynamic stability  Description: INTERVENTIONS:  - Monitor I/O, vital signs and rhythm  - Monitor for S/S and trends of decreased cardiac output  - Administer and titrate ordered vasoactive medications to optimize hemodynamic stability  - Assess quality of pulses, skin color and temperature  - Assess for signs of decreased coronary artery perfusion  - Instruct patient to report change in severity of symptoms  Outcome: Progressing  Goal: Absence of cardiac dysrhythmias or at baseline rhythm  Description: INTERVENTIONS:  - Continuous cardiac monitoring, vital signs, obtain 12 lead EKG if ordered  - Administer antiarrhythmic and heart rate control medications as ordered  - Monitor electrolytes and administer replacement therapy as ordered  Outcome: Progressing     Problem: METABOLIC, FLUID AND ELECTROLYTES - ADULT  Goal: Electrolytes maintained within normal limits  Description: INTERVENTIONS:  - Monitor labs and assess patient for signs and symptoms of electrolyte imbalances  - Administer electrolyte replacement as ordered  - Monitor response to electrolyte replacements, including repeat lab results as appropriate  - Instruct patient on fluid and nutrition as appropriate  Outcome: Progressing  Goal: Fluid balance maintained  Description: INTERVENTIONS:  - Monitor labs   - Monitor I/O and WT  - Instruct patient on fluid and nutrition as appropriate  - Assess for signs & symptoms of volume excess or deficit  Outcome: Progressing  Goal: Glucose maintained within target range  Description: INTERVENTIONS:  - Monitor Blood Glucose as ordered  - Assess for signs and symptoms of hyperglycemia and hypoglycemia  - Administer ordered medications to maintain glucose within target range  - Assess nutritional intake and initiate nutrition service referral as needed  Outcome: Progressing     Problem: HEMATOLOGIC - ADULT  Goal: Maintains  hematologic stability  Description: INTERVENTIONS  - Assess for signs and symptoms of bleeding or hemorrhage  - Monitor labs  - Administer supportive blood products/factors as ordered and appropriate  Outcome: Progressing     Problem: Nutrition/Hydration-ADULT  Goal: Nutrient/Hydration intake appropriate for improving, restoring or maintaining nutritional needs  Description: Monitor and assess patient's nutrition/hydration status for malnutrition. Collaborate with interdisciplinary team and initiate plan and interventions as ordered.  Monitor patient's weight and dietary intake as ordered or per policy. Utilize nutrition screening tool and intervene as necessary. Determine patient's food preferences and provide high-protein, high-caloric foods as appropriate.     INTERVENTIONS:  - Monitor oral intake, urinary output, labs, and treatment plans  - Assess nutrition and hydration status and recommend course of action  - Evaluate amount of meals eaten  - Assist patient with eating if necessary   - Allow adequate time for meals  - Recommend/ encourage appropriate diets, oral nutritional supplements, and vitamin/mineral supplements  - Order, calculate, and assess calorie counts as needed  - Recommend, monitor, and adjust tube feedings and TPN/PPN based on assessed needs  - Assess need for intravenous fluids  - Provide specific nutrition/hydration education as appropriate  - Include patient/family/caregiver in decisions related to nutrition  Outcome: Progressing

## 2025-01-22 NOTE — PROGRESS NOTES
Progress Note - Gastroenterology   Name: Bailey Olsen 54 y.o. female I MRN: [id number]  Unit/Bed#: [place] [address] -02 I Date of Admission: 1/18/2025   Date of Service: 1/22/2025 I Hospital Day: 4     Assessment & Plan  Rectal bleeding  54-year-old female presents as a transfer to [facility] for rectal bleeding in the setting of decompensated cirrhosis for which GI is consulted. Patient initially presented to [facility] with AMS c/f HE. she was noted to have rectal bleeding with acute drop in hemoglobin from 10.4 on admission to 5.6. PLTs 108. INR 2.1. BUN normal. EGD in March 2024 showed small nonbleeding EV. Colonoscopy at the same time showed 1 polyp and internal hemorrhoids. Not on AP/AC. Fortunately she remains hemodynamically stable. Per IR, she has a notable large splenorenal shunt with vascular anomalies involving the splenic flexure and descending colon which could represent varices versus angiodysplasia. CT high volume bleeding scan negative for obvious active GI bleeding.     Bedside bidirectional endoscopy performed in the ICU 1/18. EGD showed no active bleeding, no varices; colonoscopy with significant amount of blood clots extending to the descending colon. Since then she has remained hemodynamically stable with ongoing loose dark/clots in stools as visualized on colonoscopy. She has received 5u PRBC so far. Repeat colon 01/20/25 the ascending and cecum were unable to be fully assessed but there blood throughout the entire colon; 1x clip placed at the most proximal extent of the exam. As of 01/22 she is having dark brown stools.     Plan:  Resume diet  Monitor BMs and Hb.   If continued bleeding then would recommend CTA vs bleeding scan to localize the bleed. She may need IR intervention then.  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 Per chart review, patient was previously following with hepatology in [place] ([facility]) but was lost to follow-up in early 2024.  We are unable to view records from this practice, and it does not appear she has established care with hepatology since then.  It is not clear what previous workup has been done regarding her cirrhosis, but the patient denies having a previous liver biopsy. Patient has had 3 recent hospitalizations in September, October, and December of 2024 for recurrent hepatic encephalopathy with falls.  She now presents again with initial concern for HE and has since been transferred to [facility] for rectal bleeding as mentioned above.  Please see below for further management of cirrhosis.  MELD 3.0: 23 at 1/21/2025  6:22 AM  MELD-Na: 20 at 1/21/2025  6:22 AM  Calculated from:  Serum Creatinine: 0.76 mg/dL (Using min of 1 mg/dL) at 1/21/2025  6:22 AM  Serum Sodium: 148 mmol/L (Using max of 137 mmol/L) at 1/21/2025  6:22 AM  Total Bilirubin: 3.22 mg/dL at 1/21/2025  6:22 AM  Serum Albumin: 2 g/dL at 1/21/2025  6:22 AM  INR(ratio): 2.3 at 1/19/2025  9:26 AM  Age at listing (hypothetical): 54 years  Sex: Female at 1/21/2025  6:22 AM    Etiology: Suspected to be from MASLD.  Unclear if patient has had full serologic workup in the past.  This can be obtained as an outpatient.    Transplant Status: Unclear if this has been formally discussed to date since she was last to follow-up from her previous hepatologist in [place].  She will need to establish care  with hepatology and follow closely on discharge with discussion regarding transplant candidacy.  Ascites: Reportedly has never required paracentesis and she has never had clinically significant ascites however she has remained on spironolactone and torsemide for unclear reasons.  Most recent CT from December 2024 showed small volume ascites  She is on her home torsemide and anthony  If patient were to undergo paracentesis, replete 6-8 g albumin for every liter removed if > 5 L removed  Hepatic Encephalopathy (Type C): [place] Grade 2 -. Able to follow commands however she has overt asterixis and delayed/slurred speech. Suspected precipitants include her splenorenal shunt evidenced on CT imaging in addition to her chronic opioid use and current GI bleeding. Also noted to be febrile starting 1/18 so possible infectious source. CXR negative. Bedside ultrasound without significant ascites amendable to paracentesis for fluid analysis.   Complete infectious workup; follow-up blood cultures  Continue lactulose and rifaximin titrated to 3-4 BM/day  Diet: High protein/high calorie diet to prevent catabolic state w/ further ammonia production   Avoid narcotics and other sedating medications  Do not order or trend ammonia level   Varices: EGD March 2024 showed small nonbleeding EV. No prior history of known variceal bleeding. Inpatient bedside EGD 1/18 however showed no varices. See above for further details  NSBB: Not indicated at this time  Continue to monitor for signs of overt bleeding   Hepatoma screening: No suspicious masses seen on recent imaging.   Patient will require q.6-12month dedicated liver imaging for HCC monitoring  Acute anemia  As above   Hepatic encephalopathy (HCC)  As above    Subjective :   NAEO. Feels better now that she is out of the ICU. Eating better. Still wants to go home    No other complaints at this time    Objective :  Vitals:    01/22/25 0726   BP: 135/94   Pulse: 105   Resp:    Temp: 98 °F  (36.7 °C)   SpO2: 96%      Physical Exam  Constitutional:       General: She is not in acute distress.     Appearance: Normal appearance.   HENT:      Head: Normocephalic and atraumatic.      Nose: Nose normal.      Mouth/Throat:      Mouth: Mucous membranes are moist.   Eyes:      General: No scleral icterus.     Extraocular Movements: Extraocular movements intact.      Conjunctiva/sclera: Conjunctivae normal.      Pupils: Pupils are equal, round, and reactive to light.   Cardiovascular:      Rate and Rhythm: Normal rate and regular rhythm.   Pulmonary:      Effort: Pulmonary effort is normal.   Abdominal:      General: Abdomen is flat. There is no distension.      Palpations: There is no mass.      Tenderness: There is no abdominal tenderness.   Musculoskeletal:         General: No swelling. Normal range of motion.   Skin:     General: Skin is warm and dry.      Capillary Refill: Capillary refill takes less than 2 seconds.   Neurological:      General: No focal deficit present.      Mental Status: She is alert. She is disoriented.      Motor: Tremor (asterixis) present.   Psychiatric:         Mood and Affect: Mood normal.       Labs and imaging reviewed

## 2025-01-22 NOTE — ASSESSMENT & PLAN NOTE
54-year-old female presents as a transfer to [facility] for rectal bleeding in the setting of decompensated cirrhosis for which GI is consulted. Patient initially presented to [facility] with AMS c/f HE. she was noted to have rectal bleeding with acute drop in hemoglobin from 10.4 on admission to 5.6. PLTs 108. INR 2.1. BUN normal. EGD in March 2024 showed small nonbleeding EV. Colonoscopy at the same time showed 1 polyp and internal hemorrhoids. Not on AP/AC. Fortunately she remains hemodynamically stable. Per IR, she has a notable large splenorenal shunt with vascular anomalies involving the splenic flexure and descending colon which could represent varices versus angiodysplasia. CT high volume bleeding scan negative for obvious active GI bleeding.     Bedside bidirectional endoscopy performed in the ICU 1/18. EGD showed no active bleeding, no varices; colonoscopy with significant amount of blood clots extending to the descending colon. Since then she has remained hemodynamically stable with ongoing loose dark/clots in stools as visualized on colonoscopy. She has received 5u PRBC so far. Repeat colon 01/20/25 the ascending and cecum were unable to be fully assessed but there blood throughout the entire colon; 1x clip placed at the most proximal extent of the exam. As of 01/22 she is having dark brown stools.     Plan:  Resume diet  Monitor BMs and Hb.   If continued bleeding then would recommend CTA vs bleeding scan to localize the bleed. She may need IR intervention then.  Complete CTX x 5d for SBP PPx  IR is consulted in case TIPS is required, however she would be at high risk for worsening encephalopathy given her recurrent HE and significant splenorenal shunt   At least 2x large bore IV access  Blood transfusion per primary team; goal hgb 7-8 (please do not transfuse >8 to prevent increasing portal pressures)  If her Hb remains stable and her BMs normal, okay for potential discharge tomorrow vs Friday  See below for further  management of cirrhosis

## 2025-01-22 NOTE — ASSESSMENT & PLAN NOTE
Did have elevated ammonia levels on admission  Tolerating lactulose 4 times daily  Continue rifaximin

## 2025-01-22 NOTE — ASSESSMENT & PLAN NOTE
54-year-old female with PMH of HYATT cirrhosis presented with altered mental status  Initially admitted at [facility] on 01/17 with concerns of hepatic encephalopathy  Transferred to [facility] critical care service for rectal bleeding  Initial hemoglobin 10.4 on 1/17, 5.4 on 1/20 requiring 2 units PRBC  EGD 01/18 with no acute findings or varices.  Octreotide and PPI discontinued  Colonoscopy 01/18, 01/20 had poor prep and difficult procedure due to significant blood clots and looping.  Fresh blood noted  Completed 5 days of ceftriaxone  Fortunately patient's bleeding had stopped, hemoglobin had been stable  S/p 5 units PRBC transfused total.  Hemoglobin 6.8 today, plan to transfuse 1 more unit  GI following, diet advance.  Maintain H&H greater than 7 but less than 8  If further bleeding noted, will need high-volume CT bleeding scan or CTA as patient may be a candidate for embolization

## 2025-01-23 VITALS
DIASTOLIC BLOOD PRESSURE: 55 MMHG | OXYGEN SATURATION: 97 % | HEIGHT: 65 IN | BODY MASS INDEX: 44.37 KG/M2 | HEART RATE: 79 BPM | TEMPERATURE: 97.9 F | WEIGHT: 266.32 LBS | SYSTOLIC BLOOD PRESSURE: 106 MMHG | RESPIRATION RATE: 18 BRPM

## 2025-01-23 LAB
ABO GROUP BLD BPU: NORMAL
ANION GAP SERPL CALCULATED.3IONS-SCNC: 4 MMOL/L (ref 4–13)
BPU ID: NORMAL
BUN SERPL-MCNC: 17 MG/DL (ref 5–25)
CALCIUM SERPL-MCNC: 6.9 MG/DL (ref 8.4–10.2)
CHLORIDE SERPL-SCNC: 101 MMOL/L (ref 96–108)
CO2 SERPL-SCNC: 31 MMOL/L (ref 21–32)
CREAT SERPL-MCNC: 0.83 MG/DL (ref 0.6–1.3)
CROSSMATCH: NORMAL
ERYTHROCYTE [DISTWIDTH] IN BLOOD BY AUTOMATED COUNT: 17 % (ref 11.6–15.1)
GFR SERPL CREATININE-BSD FRML MDRD: 79 ML/MIN/1.73SQ M
GLUCOSE SERPL-MCNC: 99 MG/DL (ref 65–140)
HCT VFR BLD AUTO: 23.2 % (ref 34.8–46.1)
HGB BLD-MCNC: 7.5 G/DL (ref 11.5–15.4)
MCH RBC QN AUTO: 30.5 PG (ref 26.8–34.3)
MCHC RBC AUTO-ENTMCNC: 32.3 G/DL (ref 31.4–37.4)
MCV RBC AUTO: 94 FL (ref 82–98)
PLATELET # BLD AUTO: 62 THOUSANDS/UL (ref 149–390)
PMV BLD AUTO: 10.4 FL (ref 8.9–12.7)
POTASSIUM SERPL-SCNC: 3.1 MMOL/L (ref 3.5–5.3)
RBC # BLD AUTO: 2.46 MILLION/UL (ref 3.81–5.12)
SODIUM SERPL-SCNC: 136 MMOL/L (ref 135–147)
UNIT DISPENSE STATUS: NORMAL
UNIT PRODUCT CODE: NORMAL
UNIT PRODUCT VOLUME: 280 ML
UNIT PRODUCT VOLUME: 280 ML
UNIT PRODUCT VOLUME: 350 ML
UNIT RH: NORMAL
WBC # BLD AUTO: 9.78 THOUSAND/UL (ref 4.31–10.16)

## 2025-01-23 PROCEDURE — 99239 HOSP IP/OBS DSCHRG MGMT >30: CPT | Performed by: STUDENT IN AN ORGANIZED HEALTH CARE EDUCATION/TRAINING PROGRAM

## 2025-01-23 PROCEDURE — 85027 COMPLETE CBC AUTOMATED: CPT | Performed by: STUDENT IN AN ORGANIZED HEALTH CARE EDUCATION/TRAINING PROGRAM

## 2025-01-23 PROCEDURE — 80048 BASIC METABOLIC PNL TOTAL CA: CPT | Performed by: STUDENT IN AN ORGANIZED HEALTH CARE EDUCATION/TRAINING PROGRAM

## 2025-01-23 PROCEDURE — 97530 THERAPEUTIC ACTIVITIES: CPT

## 2025-01-23 PROCEDURE — 99232 SBSQ HOSP IP/OBS MODERATE 35: CPT | Performed by: INTERNAL MEDICINE

## 2025-01-23 PROCEDURE — 97116 GAIT TRAINING THERAPY: CPT

## 2025-01-23 RX ORDER — POTASSIUM CHLORIDE 1500 MG/1
40 TABLET, EXTENDED RELEASE ORAL ONCE
Status: COMPLETED | OUTPATIENT
Start: 2025-01-23 | End: 2025-01-23

## 2025-01-23 RX ORDER — CARVEDILOL 3.12 MG/1
3.12 TABLET ORAL 2 TIMES DAILY WITH MEALS
Qty: 60 TABLET | Refills: 0 | Status: SHIPPED | OUTPATIENT
Start: 2025-01-23

## 2025-01-23 RX ADMIN — SERTRALINE 100 MG: 100 TABLET, FILM COATED ORAL at 09:12

## 2025-01-23 RX ADMIN — SPIRONOLACTONE 50 MG: 50 TABLET ORAL at 09:11

## 2025-01-23 RX ADMIN — TORSEMIDE 20 MG: 20 TABLET ORAL at 09:11

## 2025-01-23 RX ADMIN — OXYCODONE HYDROCHLORIDE 10 MG: 10 TABLET ORAL at 15:27

## 2025-01-23 RX ADMIN — CARVEDILOL 3.12 MG: 3.12 TABLET, FILM COATED ORAL at 09:11

## 2025-01-23 RX ADMIN — CEFTRIAXONE 1000 MG: 10 INJECTION, POWDER, FOR SOLUTION INTRAVENOUS at 11:31

## 2025-01-23 RX ADMIN — LACTULOSE 20 G: 20 SOLUTION ORAL at 09:11

## 2025-01-23 RX ADMIN — LACTULOSE 20 G: 20 SOLUTION ORAL at 11:23

## 2025-01-23 RX ADMIN — POTASSIUM CHLORIDE 40 MEQ: 1500 TABLET, EXTENDED RELEASE ORAL at 09:12

## 2025-01-23 RX ADMIN — CHLORHEXIDINE GLUCONATE 0.12% ORAL RINSE 15 ML: 1.2 LIQUID ORAL at 09:11

## 2025-01-23 RX ADMIN — RIFAXIMIN 550 MG: 550 TABLET ORAL at 09:11

## 2025-01-23 RX ADMIN — OXYCODONE HYDROCHLORIDE 10 MG: 10 TABLET ORAL at 09:11

## 2025-01-23 NOTE — ASSESSMENT & PLAN NOTE
54-year-old female with PMH of HYATT cirrhosis presented with altered mental status  Initially admitted at [facility] on 01/17 with concerns of hepatic encephalopathy  Transferred to [facility] critical care service for rectal bleeding  Initial hemoglobin 10.4 on 1/17, 5.4 on 1/20 requiring 2 units PRBC  EGD 01/18 with no acute findings or varices.  Octreotide and PPI discontinued  Colonoscopy 01/18, 01/20 had poor prep and difficult procedure due to significant blood clots and looping.  Fresh blood noted  Completed 5 days of ceftriaxone  Fortunately patient's bleeding had stopped, hemoglobin had been stable  S/p 6 6 units PRBC  GI following, diet advance.  Maintain H&H greater than 7 but less than 8  If further bleeding noted, will need high-volume CT bleeding scan or CTA as patient may be a candidate for embolization  H&H has been stable, tolerating diet with no further bleeding  Cleared for discharge per GI

## 2025-01-23 NOTE — PHYSICAL THERAPY NOTE
Physical Therapy Treatment Note     01/23/25 1351   PT Last Visit   PT Visit Date 01/23/25   Note Type   Note Type Treatment   Pain Assessment   Pain Assessment Tool 0-10   Pain Score 7   Pain Location/Orientation Location: Back   Restrictions/Precautions   Weight Bearing Precautions Per Order No   Other Precautions Droplet precautions;Fall Risk;Pain;Telemetry;Chair Alarm   General   Chart Reviewed Yes   Subjective   Subjective Pt. agreeable to PT   Transfers   Sit to Stand 4  Minimal assistance   Additional items Assist x 1;Armrests;Increased time required   Stand to Sit 5  Supervision   Additional items Assist x 1;Armrests;Increased time required   Stand pivot 5  Supervision   Additional items Assist x 1;Increased time required   Ambulation/Elevation   Gait pattern Improper Weight shift;Decreased foot clearance;Short stride;Excessively slow;Decreased toe off;Decreased heel strike  (lateral sways)   Gait Assistance 5  Supervision   Additional items Assist x 1;Verbal cues   Assistive Device Rolling walker   Distance 320ft   Balance   Static Sitting Good   Dynamic Sitting Fair   Static Standing Fair   Dynamic Standing Fair -   Ambulatory Fair -   Endurance Deficit   Endurance Deficit Yes   Endurance Deficit Description pain   Activity Tolerance   Activity Tolerance Patient tolerated treatment well   Nurse Made Aware Yes   Assessment   Prognosis Good   Problem List Decreased endurance;Decreased mobility;Pain;Obesity;Decreased skin integrity;Decreased strength;Decreased range of motion   Assessment Pt. progressing well with overall mobility. BP read 106/55 mmHg prior to amb. Pt. reported no dizziness or any other discomfort pre, during and post ambulation. No LOB noted t/o session. SLow gait with lateral sways and decreased foot clearance noted during ambulation. pt. seated on chair post session with all needs within reach and chair alarm engaged. Reported patient's request for pain meds to [facility]. Will continue to  follow per PT pOC.   Barriers to Discharge None   Goals   Patient Goals None reported   STG Expiration Date 01/31/25   PT Treatment Day 1   Plan   Treatment/Interventions Functional transfer training;Spoke to nursing;Gait training;Equipment eval/education;Patient/family training   Progress Progressing toward goals   PT Frequency Other (Comment)  (3-4x/week)   Discharge Recommendation   Rehab Resource Intensity Level, PT III (Minimum Resource Intensity)   Equipment Recommended Walker   AM-PAC Basic Mobility Inpatient   Turning in Flat Bed Without Bedrails 3   Lying on Back to Sitting on Edge of Flat Bed Without Bedrails 3   Moving Bed to Chair 3   Standing Up From Chair Using Arms 3   Walk in Room 4   Climb 3-5 Stairs With Railing 3   Basic Mobility Inpatient Raw Score 19   Basic Mobility Standardized Score 42.48   [facility] Highest Level Of Mobility   -HLM Goal 6: Walk 10 steps or more   -HLM Achieved 8: Walk 250 feet ot more   End of Consult   Patient Position at End of Consult Bedside chair;Bed/Chair alarm activated;All needs within reach           Gray Muse PTA    An AM-PAC basic mobility standardized score less than 42.9 suggest the patient may benefit from discharge to post-acute rehab services.

## 2025-01-23 NOTE — PROGRESS NOTES
Progress Note - Gastroenterology   Name: Bailey Olsen 54 y.o. female I MRN: [id number]  Unit/Bed#: [place] [address] -02 I Date of Admission: 1/18/2025   Date of Service: 1/23/2025 I Hospital Day: 5     Assessment & Plan  Rectal bleeding  54-year-old female presents as a transfer to [facility] for rectal bleeding in the setting of decompensated cirrhosis for which GI is consulted. Patient initially presented to [facility] with AMS c/f HE. she was noted to have rectal bleeding with acute drop in hemoglobin from 10.4 on admission to 5.6. PLTs 108. INR 2.1. BUN normal. EGD in March 2024 showed small nonbleeding EV. Colonoscopy at the same time showed 1 polyp and internal hemorrhoids. Not on AP/AC. Fortunately she remains hemodynamically stable. Per IR, she has a notable large splenorenal shunt with vascular anomalies involving the splenic flexure and descending colon which could represent varices versus angiodysplasia. CT high volume bleeding scan negative for obvious active GI bleeding.     Bedside bidirectional endoscopy performed in the ICU 1/18. EGD showed no active bleeding, no varices; colonoscopy with significant amount of blood clots extending to the descending colon. Since then she has remained hemodynamically stable with ongoing loose dark/clots in stools as visualized on colonoscopy. She has received 5u PRBC so far. Repeat colon 01/20/25 the ascending and cecum were unable to be fully assessed but there blood throughout the entire colon; 1x clip placed at the most proximal extent of the exam. As of 01/22 she is having dark brown stools.     Plan:  Resume diet  Monitor BMs and Hb.   If continued bleeding then would recommend CTA vs bleeding scan to localize the bleed. She may need IR intervention then.  Complete CTX x 5d for SBP PPx  IR is consulted in case TIPS is required, however she would be at high risk for worsening encephalopathy given her recurrent HE and significant splenorenal shunt   At least 2x large bore IV  access  Blood transfusion per primary team; goal hgb 7-8 (please do not transfuse >8 to prevent increasing portal pressures)  Hb remains stable in the 7s. 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 Per chart review, patient was previously following with hepatology in [place] ([facility]) but was lost to follow-up in early 2024.  We are unable to view records from this practice, and it does not appear she has established care with hepatology since then.  It is not clear what previous workup has been done regarding her cirrhosis, but the patient denies having a previous liver biopsy. Patient has had 3 recent hospitalizations in September, October, and December of 2024 for recurrent hepatic encephalopathy with falls.  She now presents again with initial concern for HE and has since been transferred to [facility] for rectal bleeding as mentioned above.  Please see below for further management of cirrhosis.  MELD 3.0: 23 at 1/21/2025  6:22 AM  MELD-Na: 20 at 1/21/2025  6:22 AM  Calculated from:  Serum Creatinine: 0.76 mg/dL (Using min of 1 mg/dL) at 1/21/2025  6:22 AM  Serum Sodium: 148 mmol/L (Using max of 137 mmol/L) at 1/21/2025  6:22 AM  Total Bilirubin: 3.22 mg/dL at 1/21/2025  6:22 AM  Serum Albumin: 2 g/dL at 1/21/2025  6:22 AM  INR(ratio): 2.3 at 1/19/2025  9:26 AM  Age at listing (hypothetical): 54 years  Sex: Female at 1/21/2025  6:22 AM    Etiology: Suspected to be from MASLD.  Unclear if patient has had full serologic workup in the past.  This can be obtained as an outpatient.    Transplant Status: Unclear if this has been formally discussed to date since she was last to follow-up from her previous hepatologist in [place].  She will need to establish care with hepatology and follow  closely on discharge with discussion regarding transplant candidacy.  Ascites: Reportedly has never required paracentesis and she has never had clinically significant ascites however she has remained on spironolactone and torsemide for unclear reasons.  Most recent CT from December 2024 showed small volume ascites  She is on her home torsemide and anthony  If patient were to undergo paracentesis, replete 6-8 g albumin for every liter removed if > 5 L removed  Hepatic Encephalopathy (Type C): [place] Grade 2 -. Able to follow commands however she has overt asterixis and delayed/slurred speech. Suspected precipitants include her splenorenal shunt evidenced on CT imaging in addition to her chronic opioid use and current GI bleeding. Also noted to be febrile starting 1/18 so possible infectious source. CXR negative. Bedside ultrasound without significant ascites amendable to paracentesis for fluid analysis.   Complete infectious workup; follow-up blood cultures  Continue lactulose and rifaximin titrated to 3-4 BM/day  Diet: High protein/high calorie diet to prevent catabolic state w/ further ammonia production   Avoid narcotics and other sedating medications  Do not order or trend ammonia level   Varices: EGD March 2024 showed small nonbleeding EV. No prior history of known variceal bleeding. Inpatient bedside EGD 1/18 however showed no varices. See above for further details  NSBB: Not indicated at this time  Continue to monitor for signs of overt bleeding   Hepatoma screening: No suspicious masses seen on recent imaging.   Patient will require q.6-12month dedicated liver imaging for HCC monitoring  She was advised to f/u with her local GI docs for additional management of her cirrhosis.   Acute anemia  As above   Hepatic encephalopathy (HCC)  As above    Subjective :   NAEO. Had a brown BM. Doing well and eating well. Wants to go home.    No other complaints at this time    Objective :  Vitals:    01/23/25 0811   BP:  115/53   Pulse: 79   Resp: 18   Temp: 97.9 °F (36.6 °C)   SpO2: 97%      Physical Exam  Constitutional:       General: She is not in acute distress.     Appearance: Normal appearance.   HENT:      Head: Normocephalic and atraumatic.      Nose: Nose normal.      Mouth/Throat:      Mouth: Mucous membranes are moist.   Eyes:      General: No scleral icterus.     Extraocular Movements: Extraocular movements intact.      Conjunctiva/sclera: Conjunctivae normal.      Pupils: Pupils are equal, round, and reactive to light.   Cardiovascular:      Rate and Rhythm: Normal rate and regular rhythm.   Pulmonary:      Effort: Pulmonary effort is normal.   Abdominal:      General: Abdomen is flat. There is no distension.      Palpations: There is no mass.      Tenderness: There is no abdominal tenderness.   Musculoskeletal:         General: No swelling. Normal range of motion.   Skin:     General: Skin is warm and dry.      Capillary Refill: Capillary refill takes less than 2 seconds.   Neurological:      General: No focal deficit present.      Mental Status: She is alert. She is disoriented.      Motor: Tremor (asterixis) present.   Psychiatric:         Mood and Affect: Mood normal.       Labs and imaging reviewed

## 2025-01-23 NOTE — ASSESSMENT & PLAN NOTE
54-year-old female presents as a transfer to [facility] for rectal bleeding in the setting of decompensated cirrhosis for which GI is consulted. Patient initially presented to [facility] with AMS c/f HE. she was noted to have rectal bleeding with acute drop in hemoglobin from 10.4 on admission to 5.6. PLTs 108. INR 2.1. BUN normal. EGD in March 2024 showed small nonbleeding EV. Colonoscopy at the same time showed 1 polyp and internal hemorrhoids. Not on AP/AC. Fortunately she remains hemodynamically stable. Per IR, she has a notable large splenorenal shunt with vascular anomalies involving the splenic flexure and descending colon which could represent varices versus angiodysplasia. CT high volume bleeding scan negative for obvious active GI bleeding.     Bedside bidirectional endoscopy performed in the ICU 1/18. EGD showed no active bleeding, no varices; colonoscopy with significant amount of blood clots extending to the descending colon. Since then she has remained hemodynamically stable with ongoing loose dark/clots in stools as visualized on colonoscopy. She has received 5u PRBC so far. Repeat colon 01/20/25 the ascending and cecum were unable to be fully assessed but there blood throughout the entire colon; 1x clip placed at the most proximal extent of the exam. As of 01/22 she is having dark brown stools.     Plan:  Resume diet  Monitor BMs and Hb.   If continued bleeding then would recommend CTA vs bleeding scan to localize the bleed. She may need IR intervention then.  Complete CTX x 5d for SBP PPx  IR is consulted in case TIPS is required, however she would be at high risk for worsening encephalopathy given her recurrent HE and significant splenorenal shunt   At least 2x large bore IV access  Blood transfusion per primary team; goal hgb 7-8 (please do not transfuse >8 to prevent increasing portal pressures)  Hb remains stable in the 7s. Okay from GI perspective to discharge  See below for further management of cirrhosis

## 2025-01-23 NOTE — RESTORATIVE TECHNICIAN NOTE
Restorative Technician Note      Patient Name: Bailey Olsen     Restorative Tech Visit Date: 01/23/25  Note Type: Mobility  Patient Position Upon Consult: Bedside chair  Activity Performed: Ambulated  Assistive Device: Roller walker  Patient Position at End of Consult: Bedside chair; All needs within reach; Bed/Chair alarm activated            ns

## 2025-01-23 NOTE — PLAN OF CARE
Problem: Prexisting or High Potential for Compromised Skin Integrity  Goal: Skin integrity is maintained or improved  Description: INTERVENTIONS:  - Identify patients at risk for skin breakdown  - Assess and monitor skin integrity  - Assess and monitor nutrition and hydration status  - Monitor labs   - Assess for incontinence   - Turn and reposition patient  - Assist with mobility/ambulation  - Relieve pressure over bony prominences  - Avoid friction and shearing  - Provide appropriate hygiene as needed including keeping skin clean and dry  - Evaluate need for skin moisturizer/barrier cream  - Collaborate with interdisciplinary team   - Patient/family teaching  - Consider wound care consult   Outcome: Progressing     Problem: PAIN - ADULT  Goal: Verbalizes/displays adequate comfort level or baseline comfort level  Description: Interventions:  - Encourage patient to monitor pain and request assistance  - Assess pain using appropriate pain scale  - Administer analgesics based on type and severity of pain and evaluate response  - Implement non-pharmacological measures as appropriate and evaluate response  - Consider cultural and social influences on pain and pain management  - Notify physician/advanced practitioner if interventions unsuccessful or patient reports new pain  Outcome: Progressing     Problem: INFECTION - ADULT  Goal: Absence or prevention of progression during hospitalization  Description: INTERVENTIONS:  - Assess and monitor for signs and symptoms of infection  - Monitor lab/diagnostic results  - Monitor all insertion sites, i.e. indwelling lines, tubes, and drains  - Monitor endotracheal if appropriate and nasal secretions for changes in amount and color  - [place] appropriate cooling/warming therapies per order  - Administer medications as ordered  - Instruct and encourage patient and family to use good hand hygiene technique  - Identify and instruct in appropriate isolation precautions for  identified infection/condition  Outcome: Progressing  Goal: Absence of fever/infection during neutropenic period  Description: INTERVENTIONS:  - Monitor WBC    Outcome: Progressing

## 2025-01-23 NOTE — PLAN OF CARE
Problem: PHYSICAL THERAPY ADULT  Goal: Performs mobility at highest level of function for planned discharge setting.  See evaluation for individualized goals.  Description: Treatment/Interventions: Functional transfer training, LE strengthening/ROM, Elevations, Therapeutic exercise, Patient/family training, Equipment eval/education, Bed mobility, Gait training, Compensatory technique education, Continued evaluation, Spoke to nursing, OT  Equipment Recommended: Walker       See flowsheet documentation for full assessment, interventions and recommendations.  Outcome: Progressing  Note: Prognosis: Good  Problem List: Decreased endurance, Decreased mobility, Pain, Obesity, Decreased skin integrity, Decreased strength, Decreased range of motion  Assessment: Pt. progressing well with overall mobility. BP read 106/55 mmHg prior to amb. Pt. reported no dizziness or any other discomfort pre, during and post ambulation. No LOB noted t/o session. SLow gait with lateral sways and decreased foot clearance noted during ambulation. pt. seated on chair post session with all needs within reach and chair alarm engaged. Reported patient's request for pain meds to [facility]. Will continue to follow per PT pOC.  Barriers to Discharge: None  Barriers to Discharge Comments: functional decline, unclear support at home  Rehab Resource Intensity Level, PT: III (Minimum Resource Intensity)    See flowsheet documentation for full assessment.

## 2025-01-23 NOTE — ASSESSMENT & PLAN NOTE
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 Per chart review, patient was previously following with hepatology in [place] ([facility]) but was lost to follow-up in early 2024.  We are unable to view records from this practice, and it does not appear she has established care with hepatology since then.  It is not clear what previous workup has been done regarding her cirrhosis, but the patient denies having a previous liver biopsy. Patient has had 3 recent hospitalizations in September, October, and December of 2024 for recurrent hepatic encephalopathy with falls.  She now presents again with initial concern for HE and has since been transferred to [facility] for rectal bleeding as mentioned above.  Please see below for further management of cirrhosis.  MELD 3.0: 23 at 1/21/2025  6:22 AM  MELD-Na: 20 at 1/21/2025  6:22 AM  Calculated from:  Serum Creatinine: 0.76 mg/dL (Using min of 1 mg/dL) at 1/21/2025  6:22 AM  Serum Sodium: 148 mmol/L (Using max of 137 mmol/L) at 1/21/2025  6:22 AM  Total Bilirubin: 3.22 mg/dL at 1/21/2025  6:22 AM  Serum Albumin: 2 g/dL at 1/21/2025  6:22 AM  INR(ratio): 2.3 at 1/19/2025  9:26 AM  Age at listing (hypothetical): 54 years  Sex: Female at 1/21/2025  6:22 AM    Etiology: Suspected to be from MASLD.  Unclear if patient has had full serologic workup in the past.  This can be obtained as an outpatient.    Transplant Status: Unclear if this has been formally discussed to date since she was last to follow-up from her previous hepatologist in [place].  She will need to establish care with hepatology and follow closely on discharge with discussion regarding transplant candidacy.  Ascites: Reportedly has never required paracentesis and she has never had clinically significant ascites however she has remained on spironolactone and torsemide for unclear reasons.  Most recent CT from December 2024 showed small volume  ascites  She is on her home torsemide and anthony  If patient were to undergo paracentesis, replete 6-8 g albumin for every liter removed if > 5 L removed  Hepatic Encephalopathy (Type C): [place] Grade 2 -. Able to follow commands however she has overt asterixis and delayed/slurred speech. Suspected precipitants include her splenorenal shunt evidenced on CT imaging in addition to her chronic opioid use and current GI bleeding. Also noted to be febrile starting 1/18 so possible infectious source. CXR negative. Bedside ultrasound without significant ascites amendable to paracentesis for fluid analysis.   Complete infectious workup; follow-up blood cultures  Continue lactulose and rifaximin titrated to 3-4 BM/day  Diet: High protein/high calorie diet to prevent catabolic state w/ further ammonia production   Avoid narcotics and other sedating medications  Do not order or trend ammonia level   Varices: EGD March 2024 showed small nonbleeding EV. No prior history of known variceal bleeding. Inpatient bedside EGD 1/18 however showed no varices. See above for further details  NSBB: Not indicated at this time  Continue to monitor for signs of overt bleeding   Hepatoma screening: No suspicious masses seen on recent imaging.   Patient will require q.6-12month dedicated liver imaging for HCC monitoring  She was advised to f/u with her local GI docs for additional management of her cirrhosis.

## 2025-01-23 NOTE — DISCHARGE SUMMARY
Discharge Summary - Hospitalist   Name: Bailey Olsen 54 y.o. female I MRN: [id number]  Unit/Bed#: [place] [address] -02 I Date of Admission: 1/18/2025   Date of Service: 1/23/2025 I Hospital Day: 5     Assessment & Plan  Acute anemia  54-year-old female with PMH of HYATT cirrhosis presented with altered mental status  Initially admitted at [facility] on 01/17 with concerns of hepatic encephalopathy  Transferred to [facility] critical care service for rectal bleeding  Initial hemoglobin 10.4 on 1/17, 5.4 on 1/20 requiring 2 units PRBC  EGD 01/18 with no acute findings or varices.  Octreotide and PPI discontinued  Colonoscopy 01/18, 01/20 had poor prep and difficult procedure due to significant blood clots and looping.  Fresh blood noted  Completed 5 days of ceftriaxone  Fortunately patient's bleeding had stopped, hemoglobin had been stable  S/p 6 6 units PRBC  GI following, diet advance.  Maintain H&H greater than 7 but less than 8  If further bleeding noted, will need high-volume CT bleeding scan or CTA as patient may be a candidate for embolization  H&H has been stable, tolerating diet with no further bleeding  Cleared for discharge per GI  Decompensated HYATT cirrhosis with hepatic encephalopathy and GI bleed  History of HYATT cirrhosis  Tolerating rifaximin, lactulose  Recent EGD without evidence of varices  Continue carvedilol, torsemide and Aldactone  Recommend decreasing dose of carvedilol to 3.125 mg as blood pressure remains soft  Class 3 severe obesity due to excess calories in adult (HCC)  BMI noted to be 45  Would benefit from weight loss, dietary changes and exercise  Hepatic encephalopathy (HCC)  Did have elevated ammonia levels on admission  Tolerating lactulose 4 times daily  Continue rifaximin  Influenza B  Tested positive for influenza B on January 17  Completed Tamiflu course  Rectal bleeding  Please review above in acute anemia  Chronic, continuous use of opioids  PDMP reviewed  Continue pain meds  as needed     Discharging Physician / Practitioner: Piyush Arora MD  PCP: Nayely Brown DO  Admission Date:   Admission Orders (From admission, onward)       Ordered        01/18/25 1440  INPATIENT ADMISSION  Once                          Discharge Date: 01/23/25    Medical Problems       Resolved Problems  Date Reviewed: 10/6/2024          Resolved    Fever 1/20/2025     Resolved by  Darryl Mahajan MD          Consultations During Hospital Stay:  Gastroenterology  Critical Care    Procedures Performed:   EGD, Colonoscopy    Significant Findings / Test Results:   Colonoscopy  Result Date: 1/20/2025  Impression: The procedure was difficult due to significant blood, clots, and looping. Despite manual pressure and multiple patient position changes, cecum could not be reached. Colonoscope could only reach about 70-75 cm (likely proximal transverse colon), and 1 clip was placed at the distal extent of the exam to localize the area on future CT. Significant amount of pancolonic fresh blood and blood clotting RECOMMENDATION: Schedule repeat colonoscopy, due: 1/20/2026 Incomplete procedure  Recommended transfusing 2nd unit pRBC. Transfuse for goal Hgb > 7 or active bleeding. Repeat CT high volume bleeding scan with the next episode of significant hematochezia. Will also let IR know, as embolization would be the next step if she continues to have severe hematochezia. Repeat EGD deferred given colonoscopy findings and normal EGD yesterday.    Anselmo Douglas MD     XR chest portable ICU  Result Date: 1/19/2025  Impression: Mild left parahilar groundglass opacity may represent pneumonia. Workstation performed: [id number]     CT head wo contrast  Result Date: 1/19/2025  Impression: No acute intracranial abnormality. There is motion artifact which limits evaluation of the skull base and mandible region. Resident: SHELBY ALCANTAR I, the attending radiologist, have reviewed the images and agree with the final  report above. Workstation performed: [id number]     XR chest portable ICU  Result Date: 1/19/2025  Impression: No acute cardiopulmonary disease. ET tube 4 cm above the myra. Workstation performed: [id number]     Colonoscopy  Result Date: 1/18/2025  Impression: Significant amount of blood clotting in the descending colon, sigmoid colon, rectosigmoid and rectum 55 cm from the anal verge, which obscured visualization No bright red blood was visualized in the examined portion of the colon. RECOMMENDATION: Schedule repeat colonoscopy, due: 1/20/2025 Inadequate bowel preparation  Keep patient NPO. Given altered mental status, plan to administer bowel preparation via OG tube (begin prep today).  If patient remains stable, then plan for colonoscopy on Monday 1/20.  If unstable, may need colonoscopy sooner. Continue Ceftriaxone. Monitor Hgb.      Dr. Mary Delgado MD    EGD  Result Date: 1/18/2025  Impression: The upper third of the esophagus and middle third of the esophagus appeared normal. No obvious esophageal varices were seen. 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 There was no potential source of bleeding, no bleeding lesion or stigmata of recent bleeding on this exam.   There was some clot present in the esophagus that was lavaged and completely cleared to obtain good views of the esophagus (this was bleeding from an oral laceration sustained during endotracheal intubation). RECOMMENDATION: Proceed with colonoscopy Continue Ceftriaxone for prophylaxis of SBP. Can discontinue Protonix and Octreotide. Continue to monitor hemoglobin.    Mary Delgado MD     CT high volume bleeding scan abdomen pelvis  Result Date: 1/18/2025  Impression: No CT evidence of active high-volume gastrointestinal hemorrhage. 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 female patient who originally presented to the hospital on 1/18/2025 due to GI bleed.  Initially admitted to [place] with hepatic encephalopathy that resolved.  Developed rectal bleeding.  Transferred to [facility] for critical care service and urgent EGD.  EGD without any acute findings, colonoscopy fresh bleeding.  There was concern regarding uncontrolled bleed and patient was monitored closely as she may require IR embolization.  Fortunately her hemoglobin had stabilized after 6 units PRBC.  No further bleeding noted.  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  She had not been on any anticoagulation or antiplatelets.  As her bleeding had resolved, her diet was slowly advanced and monitor.  H&H had been stable and she was cleared for discharge per GI.    Please see above list of diagnoses and related plan for additional information.     Condition at Discharge: fair     Discharge Day Visit / Exam:     Subjective:    Ports doing well today.  No complaints at this time.  Denies any chest pain or shortness of breath.  Had normal bowel movement.  Vitals: Blood Pressure: 106/55 (01/23/25 1335)  Pulse: 79 (01/23/25 0811)  Temperature: 97.9 °F (36.6 °C) (01/23/25 0811)  Temp Source: Oral (01/22/25 2028)  Respirations: 18 (01/23/25 0811)  Height: 5' 5\" (165.1 cm) (01/18/25 1536)  Weight - Scale: 121 kg (266 lb 5.1 oz) (01/23/25 0531)  SpO2: 97 % (01/23/25 0811)  Exam:   Physical Exam  Vitals and nursing note reviewed.   Constitutional:       Appearance: She is obese.   HENT:      Head: Normocephalic.   Eyes:      " Conjunctiva/sclera: Conjunctivae normal.   Cardiovascular:      Rate and Rhythm: Normal rate.   Pulmonary:      Effort: Pulmonary effort is normal.      Breath sounds: No wheezing.   Abdominal:      General: Bowel sounds are normal. There is no distension.      Palpations: Abdomen is soft.   Musculoskeletal:         General: No swelling. Normal range of motion.   Skin:     General: Skin is warm.      Coloration: Skin is pale.   Neurological:      General: No focal deficit present.      Mental Status: She is alert. Mental status is at baseline.       Discharge instructions/Information to patient and family:   See after visit summary for information provided to patient and family.      Provisions for Follow-Up Care:  See after visit summary for information related to follow-up care and any pertinent home health orders.      Disposition:     Home     Discharge Statement:  I spent 40 minutes discharging the patient. This time was spent on the day of discharge. I had direct contact with the patient on the day of discharge. Greater than 50% of the total time was spent examining patient, answering all patient questions, arranging and discussing plan of care with patient as well as directly providing post-discharge instructions.  Additional time then spent on discharge activities.    Discharge Medications:  See after visit summary for reconciled discharge medications provided to patient and family.      ** Please Note: This note has been constructed using a voice recognition system **

## 2025-01-23 NOTE — PLAN OF CARE
Problem: Prexisting or High Potential for Compromised Skin Integrity  Goal: Skin integrity is maintained or improved  Description: INTERVENTIONS:  - Identify patients at risk for skin breakdown  - Assess and monitor skin integrity  - Assess and monitor nutrition and hydration status  - Monitor labs   - Assess for incontinence   - Turn and reposition patient  - Assist with mobility/ambulation  - Relieve pressure over bony prominences  - Avoid friction and shearing  - Provide appropriate hygiene as needed including keeping skin clean and dry  - Evaluate need for skin moisturizer/barrier cream  - Collaborate with interdisciplinary team   - Patient/family teaching  - Consider wound care consult   Outcome: Progressing     Problem: PAIN - ADULT  Goal: Verbalizes/displays adequate comfort level or baseline comfort level  Description: Interventions:  - Encourage patient to monitor pain and request assistance  - Assess pain using appropriate pain scale  - Administer analgesics based on type and severity of pain and evaluate response  - Implement non-pharmacological measures as appropriate and evaluate response  - Consider cultural and social influences on pain and pain management  - Notify physician/advanced practitioner if interventions unsuccessful or patient reports new pain  Outcome: Progressing     Problem: INFECTION - ADULT  Goal: Absence or prevention of progression during hospitalization  Description: INTERVENTIONS:  - Assess and monitor for signs and symptoms of infection  - Monitor lab/diagnostic results  - Monitor all insertion sites, i.e. indwelling lines, tubes, and drains  - Monitor endotracheal if appropriate and nasal secretions for changes in amount and color  - Hinkley appropriate cooling/warming therapies per order  - Administer medications as ordered  - Instruct and encourage patient and family to use good hand hygiene technique  - Identify and instruct in appropriate isolation precautions for  identified infection/condition  Outcome: Progressing  Goal: Absence of fever/infection during neutropenic period  Description: INTERVENTIONS:  - Monitor WBC    Outcome: Progressing     Problem: SAFETY ADULT  Goal: Patient will remain free of falls  Description: INTERVENTIONS:  - Educate patient/family on patient safety including physical limitations  - Instruct patient to call for assistance with activity   - Consult OT/PT to assist with strengthening/mobility   - Keep Call bell within reach  - Keep bed low and locked with side rails adjusted as appropriate  - Keep care items and personal belongings within reach  - Initiate and maintain comfort rounds  - Make Fall Risk Sign visible to staff  - Offer Toileting every 2 Hours, in advance of need  - Initiate/Maintain bed alarm  - Obtain necessary fall risk management equipment: bed alarm  - Apply yellow socks and bracelet for high fall risk patients  - Consider moving patient to room near nurses station  Outcome: Progressing  Goal: Maintain or return to baseline ADL function  Description: INTERVENTIONS:  -  Assess patient's ability to carry out ADLs; assess patient's baseline for ADL function and identify physical deficits which impact ability to perform ADLs (bathing, care of mouth/teeth, toileting, grooming, dressing, etc.)  - Assess/evaluate cause of self-care deficits   - Assess range of motion  - Assess patient's mobility; develop plan if impaired  - Assess patient's need for assistive devices and provide as appropriate  - Encourage maximum independence but intervene and supervise when necessary  - Involve family in performance of ADLs  - Assess for home care needs following discharge   - Consider OT consult to assist with ADL evaluation and planning for discharge  - Provide patient education as appropriate  Outcome: Progressing  Goal: Maintains/Returns to pre admission functional level  Description: INTERVENTIONS:  - Perform AM-PAC 6 Click Basic Mobility/ Daily  Activity assessment daily.  - Set and communicate daily mobility goal to care team and patient/family/caregiver.   - Collaborate with rehabilitation services on mobility goals if consulted  - Perform Range of Motion 3 times a day.  - Reposition patient every 2 hours.  - Dangle patient 3 times a day  - Stand patient 3 times a day  - Ambulate patient 3 times a day  - Out of bed to chair 3 times a day   - Out of bed for meals 3 times a day  - Out of bed for toileting  - Record patient progress and toleration of activity level   Outcome: Progressing     Problem: DISCHARGE PLANNING  Goal: Discharge to home or other facility with appropriate resources  Description: INTERVENTIONS:  - Identify barriers to discharge w/patient and caregiver  - Arrange for needed discharge resources and transportation as appropriate  - Identify discharge learning needs (meds, wound care, etc.)  - Arrange for interpretive services to assist at discharge as needed  - Refer to Case Management Department for coordinating discharge planning if the patient needs post-hospital services based on physician/advanced practitioner order or complex needs related to functional status, cognitive ability, or social support system  Outcome: Progressing     Problem: Knowledge Deficit  Goal: Patient/family/caregiver demonstrates understanding of disease process, treatment plan, medications, and discharge instructions  Description: Complete learning assessment and assess knowledge base.  Interventions:  - Provide teaching at level of understanding  - Provide teaching via preferred learning methods  Outcome: Progressing     Problem: SAFETY,RESTRAINT: NV/NON-SELF DESTRUCTIVE BEHAVIOR  Goal: Remains free of harm/injury (restraint for non violent/non self-detsructive behavior)  Description: INTERVENTIONS:  - Instruct patient/family regarding restraint use   - Assess and monitor physiologic and psychological status   - Provide interventions and comfort measures to  meet assessed patient needs   - Identify and implement measures to help patient regain control  - Assess readiness for release of restraint   Outcome: Progressing  Goal: Returns to optimal restraint-free functioning  Description: INTERVENTIONS:  - Assess the patient's behavior and symptoms that indicate continued need for restraint  - Identify and implement measures to help patient regain control  - Assess readiness for release of restraint   Outcome: Progressing     Problem: NEUROSENSORY - ADULT  Goal: Achieves stable or improved neurological status  Description: INTERVENTIONS  - Monitor and report changes in neurological status  - Monitor vital signs such as temperature, blood pressure, glucose, and any other labs ordered   - Initiate measures to prevent increased intracranial pressure  - Monitor for seizure activity and implement precautions if appropriate      Outcome: Progressing  Goal: Remains free of injury related to seizures activity  Description: INTERVENTIONS  - Maintain airway, patient safety  and administer oxygen as ordered  - Monitor patient for seizure activity, document and report duration and description of seizure to physician/advanced practitioner  - If seizure occurs,  ensure patient safety during seizure  - Reorient patient post seizure  - Seizure pads on all 4 side rails  - Instruct patient/family to notify RN of any seizure activity including if an aura is experienced  - Instruct patient/family to call for assistance with activity based on nursing assessment  - Administer anti-seizure medications if ordered    Outcome: Progressing  Goal: Achieves maximal functionality and self care  Description: INTERVENTIONS  - Monitor swallowing and airway patency with patient fatigue and changes in neurological status  - Encourage and assist patient to increase activity and self care.   - Encourage visually impaired, hearing impaired and aphasic patients to use assistive/communication devices  Outcome:  Progressing     Problem: CARDIOVASCULAR - ADULT  Goal: Maintains optimal cardiac output and hemodynamic stability  Description: INTERVENTIONS:  - Monitor I/O, vital signs and rhythm  - Monitor for S/S and trends of decreased cardiac output  - Administer and titrate ordered vasoactive medications to optimize hemodynamic stability  - Assess quality of pulses, skin color and temperature  - Assess for signs of decreased coronary artery perfusion  - Instruct patient to report change in severity of symptoms  Outcome: Progressing  Goal: Absence of cardiac dysrhythmias or at baseline rhythm  Description: INTERVENTIONS:  - Continuous cardiac monitoring, vital signs, obtain 12 lead EKG if ordered  - Administer antiarrhythmic and heart rate control medications as ordered  - Monitor electrolytes and administer replacement therapy as ordered  Outcome: Progressing     Problem: METABOLIC, FLUID AND ELECTROLYTES - ADULT  Goal: Electrolytes maintained within normal limits  Description: INTERVENTIONS:  - Monitor labs and assess patient for signs and symptoms of electrolyte imbalances  - Administer electrolyte replacement as ordered  - Monitor response to electrolyte replacements, including repeat lab results as appropriate  - Instruct patient on fluid and nutrition as appropriate  Outcome: Progressing  Goal: Fluid balance maintained  Description: INTERVENTIONS:  - Monitor labs   - Monitor I/O and WT  - Instruct patient on fluid and nutrition as appropriate  - Assess for signs & symptoms of volume excess or deficit  Outcome: Progressing  Goal: Glucose maintained within target range  Description: INTERVENTIONS:  - Monitor Blood Glucose as ordered  - Assess for signs and symptoms of hyperglycemia and hypoglycemia  - Administer ordered medications to maintain glucose within target range  - Assess nutritional intake and initiate nutrition service referral as needed  Outcome: Progressing     Problem: HEMATOLOGIC - ADULT  Goal: Maintains  hematologic stability  Description: INTERVENTIONS  - Assess for signs and symptoms of bleeding or hemorrhage  - Monitor labs  - Administer supportive blood products/factors as ordered and appropriate  Outcome: Progressing     Problem: Nutrition/Hydration-ADULT  Goal: Nutrient/Hydration intake appropriate for improving, restoring or maintaining nutritional needs  Description: Monitor and assess patient's nutrition/hydration status for malnutrition. Collaborate with interdisciplinary team and initiate plan and interventions as ordered.  Monitor patient's weight and dietary intake as ordered or per policy. Utilize nutrition screening tool and intervene as necessary. Determine patient's food preferences and provide high-protein, high-caloric foods as appropriate.     INTERVENTIONS:  - Monitor oral intake, urinary output, labs, and treatment plans  - Assess nutrition and hydration status and recommend course of action  - Evaluate amount of meals eaten  - Assist patient with eating if necessary   - Allow adequate time for meals  - Recommend/ encourage appropriate diets, oral nutritional supplements, and vitamin/mineral supplements  - Order, calculate, and assess calorie counts as needed  - Recommend, monitor, and adjust tube feedings and TPN/PPN based on assessed needs  - Assess need for intravenous fluids  - Provide specific nutrition/hydration education as appropriate  - Include patient/family/caregiver in decisions related to nutrition  Outcome: Progressing

## 2025-01-23 NOTE — ASSESSMENT & PLAN NOTE
History of HYATT cirrhosis  Tolerating rifaximin, lactulose  Recent EGD without evidence of varices  Continue carvedilol, torsemide and Aldactone  Recommend decreasing dose of carvedilol to 3.125 mg as blood pressure remains soft

## 2025-01-23 NOTE — PLAN OF CARE
Problem: Prexisting or High Potential for Compromised Skin Integrity  Goal: Skin integrity is maintained or improved  Description: INTERVENTIONS:  - Identify patients at risk for skin breakdown  - Assess and monitor skin integrity  - Assess and monitor nutrition and hydration status  - Monitor labs   - Assess for incontinence   - Turn and reposition patient  - Assist with mobility/ambulation  - Relieve pressure over bony prominences  - Avoid friction and shearing  - Provide appropriate hygiene as needed including keeping skin clean and dry  - Evaluate need for skin moisturizer/barrier cream  - Collaborate with interdisciplinary team   - Patient/family teaching  - Consider wound care consult   1/23/2025 1652 by Sayra Le RN  Outcome: Adequate for Discharge  1/23/2025 1652 by Sayra Le RN  Outcome: Progressing     Problem: PAIN - ADULT  Goal: Verbalizes/displays adequate comfort level or baseline comfort level  Description: Interventions:  - Encourage patient to monitor pain and request assistance  - Assess pain using appropriate pain scale  - Administer analgesics based on type and severity of pain and evaluate response  - Implement non-pharmacological measures as appropriate and evaluate response  - Consider cultural and social influences on pain and pain management  - Notify physician/advanced practitioner if interventions unsuccessful or patient reports new pain  1/23/2025 1652 by Sayra Le RN  Outcome: Adequate for Discharge  1/23/2025 1652 by Sayra Le RN  Outcome: Progressing     Problem: INFECTION - ADULT  Goal: Absence or prevention of progression during hospitalization  Description: INTERVENTIONS:  - Assess and monitor for signs and symptoms of infection  - Monitor lab/diagnostic results  - Monitor all insertion sites, i.e. indwelling lines, tubes, and drains  - Monitor endotracheal if appropriate and nasal secretions for changes in amount and color  - [place] appropriate  cooling/warming therapies per order  - Administer medications as ordered  - Instruct and encourage patient and family to use good hand hygiene technique  - Identify and instruct in appropriate isolation precautions for identified infection/condition  1/23/2025 1652 by Sayra Le RN  Outcome: Adequate for Discharge  1/23/2025 1652 by Sayra Le RN  Outcome: Progressing  Goal: Absence of fever/infection during neutropenic period  Description: INTERVENTIONS:  - Monitor WBC    1/23/2025 1652 by Sayra Le RN  Outcome: Adequate for Discharge  1/23/2025 1652 by Sayra Le RN  Outcome: Progressing     Problem: SAFETY ADULT  Goal: Patient will remain free of falls  Description: INTERVENTIONS:  - Educate patient/family on patient safety including physical limitations  - Instruct patient to call for assistance with activity   - Consult OT/PT to assist with strengthening/mobility   - Keep Call bell within reach  - Keep bed low and locked with side rails adjusted as appropriate  - Keep care items and personal belongings within reach  - Initiate and maintain comfort rounds  - Make Fall Risk Sign visible to staff  - Offer Toileting every 2 Hours, in advance of need  - Initiate/Maintain bed alarm  - Obtain necessary fall risk management equipment:   - Apply yellow socks and bracelet for high fall risk patients  - Consider moving patient to room near nurses station  1/23/2025 1652 by Sayra Le RN  Outcome: Adequate for Discharge  1/23/2025 1652 by Sayra Le RN  Outcome: Progressing  Goal: Maintain or return to baseline ADL function  Description: INTERVENTIONS:  -  Assess patient's ability to carry out ADLs; assess patient's baseline for ADL function and identify physical deficits which impact ability to perform ADLs (bathing, care of mouth/teeth, toileting, grooming, dressing, etc.)  - Assess/evaluate cause of self-care deficits   - Assess range of motion  - Assess patient's mobility;  develop plan if impaired  - Assess patient's need for assistive devices and provide as appropriate  - Encourage maximum independence but intervene and supervise when necessary  - Involve family in performance of ADLs  - Assess for home care needs following discharge   - Consider OT consult to assist with ADL evaluation and planning for discharge  - Provide patient education as appropriate  1/23/2025 1652 by Sayra Le RN  Outcome: Adequate for Discharge  1/23/2025 1652 by Sayra Le RN  Outcome: Progressing  Goal: Maintains/Returns to pre admission functional level  Description: INTERVENTIONS:  - Perform AM-PAC 6 Click Basic Mobility/ Daily Activity assessment daily.  - Set and communicate daily mobility goal to care team and patient/family/caregiver.   - Collaborate with rehabilitation services on mobility goals if consulted  - Perform Range of Motion 3 times a day.  - Reposition patient every 2 hours.  - Dangle patient 3 times a day  - Stand patient 3 times a day  - Ambulate patient 3 times a day  - Out of bed to chair 3 times a day   - Out of bed for meals 3 times a day  - Out of bed for toileting  - Record patient progress and toleration of activity level   1/23/2025 1652 by Sayra Le RN  Outcome: Adequate for Discharge  1/23/2025 1652 by Sayra Le RN  Outcome: Progressing     Problem: DISCHARGE PLANNING  Goal: Discharge to home or other facility with appropriate resources  Description: INTERVENTIONS:  - Identify barriers to discharge w/patient and caregiver  - Arrange for needed discharge resources and transportation as appropriate  - Identify discharge learning needs (meds, wound care, etc.)  - Arrange for interpretive services to assist at discharge as needed  - Refer to Case Management Department for coordinating discharge planning if the patient needs post-hospital services based on physician/advanced practitioner order or complex needs related to functional status, cognitive  ability, or social support system  1/23/2025 1652 by Sayra Le RN  Outcome: Adequate for Discharge  1/23/2025 1652 by Sayra Le RN  Outcome: Progressing     Problem: Knowledge Deficit  Goal: Patient/family/caregiver demonstrates understanding of disease process, treatment plan, medications, and discharge instructions  Description: Complete learning assessment and assess knowledge base.  Interventions:  - Provide teaching at level of understanding  - Provide teaching via preferred learning methods  1/23/2025 1652 by Sayra Le RN  Outcome: Adequate for Discharge  1/23/2025 1652 by Sayra Le RN  Outcome: Progressing     Problem: SAFETY,RESTRAINT: NV/NON-SELF DESTRUCTIVE BEHAVIOR  Goal: Remains free of harm/injury (restraint for non violent/non self-detsructive behavior)  Description: INTERVENTIONS:  - Instruct patient/family regarding restraint use   - Assess and monitor physiologic and psychological status   - Provide interventions and comfort measures to meet assessed patient needs   - Identify and implement measures to help patient regain control  - Assess readiness for release of restraint   1/23/2025 1652 by Sayra Le RN  Outcome: Adequate for Discharge  1/23/2025 1652 by Sayra Le RN  Outcome: Progressing  Goal: Returns to optimal restraint-free functioning  Description: INTERVENTIONS:  - Assess the patient's behavior and symptoms that indicate continued need for restraint  - Identify and implement measures to help patient regain control  - Assess readiness for release of restraint   1/23/2025 1652 by Sayra Le RN  Outcome: Adequate for Discharge  1/23/2025 1652 by Sayra Le RN  Outcome: Progressing     Problem: NEUROSENSORY - ADULT  Goal: Achieves stable or improved neurological status  Description: INTERVENTIONS  - Monitor and report changes in neurological status  - Monitor vital signs such as temperature, blood pressure, glucose, and any other  labs ordered   - Initiate measures to prevent increased intracranial pressure  - Monitor for seizure activity and implement precautions if appropriate      1/23/2025 1652 by Sayra Le RN  Outcome: Adequate for Discharge  1/23/2025 1652 by Sayra Le RN  Outcome: Progressing  Goal: Remains free of injury related to seizures activity  Description: INTERVENTIONS  - Maintain airway, patient safety  and administer oxygen as ordered  - Monitor patient for seizure activity, document and report duration and description of seizure to physician/advanced practitioner  - If seizure occurs,  ensure patient safety during seizure  - Reorient patient post seizure  - Seizure pads on all 4 side rails  - Instruct patient/family to notify RN of any seizure activity including if an aura is experienced  - Instruct patient/family to call for assistance with activity based on nursing assessment  - Administer anti-seizure medications if ordered    1/23/2025 1652 by Sayra Le RN  Outcome: Adequate for Discharge  1/23/2025 1652 by Sayra Le RN  Outcome: Progressing  Goal: Achieves maximal functionality and self care  Description: INTERVENTIONS  - Monitor swallowing and airway patency with patient fatigue and changes in neurological status  - Encourage and assist patient to increase activity and self care.   - Encourage visually impaired, hearing impaired and aphasic patients to use assistive/communication devices  1/23/2025 1652 by Sayra Le RN  Outcome: Adequate for Discharge  1/23/2025 1652 by Sayra Le RN  Outcome: Progressing     Problem: CARDIOVASCULAR - ADULT  Goal: Maintains optimal cardiac output and hemodynamic stability  Description: INTERVENTIONS:  - Monitor I/O, vital signs and rhythm  - Monitor for S/S and trends of decreased cardiac output  - Administer and titrate ordered vasoactive medications to optimize hemodynamic stability  - Assess quality of pulses, skin color and  temperature  - Assess for signs of decreased coronary artery perfusion  - Instruct patient to report change in severity of symptoms  1/23/2025 1652 by Sayra Le RN  Outcome: Adequate for Discharge  1/23/2025 1652 by Sayra Le RN  Outcome: Progressing  Goal: Absence of cardiac dysrhythmias or at baseline rhythm  Description: INTERVENTIONS:  - Continuous cardiac monitoring, vital signs, obtain 12 lead EKG if ordered  - Administer antiarrhythmic and heart rate control medications as ordered  - Monitor electrolytes and administer replacement therapy as ordered  1/23/2025 1652 by Sayra Le RN  Outcome: Adequate for Discharge  1/23/2025 1652 by Sayra Le RN  Outcome: Progressing     Problem: METABOLIC, FLUID AND ELECTROLYTES - ADULT  Goal: Electrolytes maintained within normal limits  Description: INTERVENTIONS:  - Monitor labs and assess patient for signs and symptoms of electrolyte imbalances  - Administer electrolyte replacement as ordered  - Monitor response to electrolyte replacements, including repeat lab results as appropriate  - Instruct patient on fluid and nutrition as appropriate  1/23/2025 1652 by Sayra Le RN  Outcome: Adequate for Discharge  1/23/2025 1652 by Sarya Le RN  Outcome: Progressing  Goal: Fluid balance maintained  Description: INTERVENTIONS:  - Monitor labs   - Monitor I/O and WT  - Instruct patient on fluid and nutrition as appropriate  - Assess for signs & symptoms of volume excess or deficit  1/23/2025 1652 by Sayra Le RN  Outcome: Adequate for Discharge  1/23/2025 1652 by Sayra Le RN  Outcome: Progressing  Goal: Glucose maintained within target range  Description: INTERVENTIONS:  - Monitor Blood Glucose as ordered  - Assess for signs and symptoms of hyperglycemia and hypoglycemia  - Administer ordered medications to maintain glucose within target range  - Assess nutritional intake and initiate nutrition service referral as  needed  1/23/2025 1652 by Sayra Le RN  Outcome: Adequate for Discharge  1/23/2025 1652 by Sayra Le RN  Outcome: Progressing     Problem: HEMATOLOGIC - ADULT  Goal: Maintains hematologic stability  Description: INTERVENTIONS  - Assess for signs and symptoms of bleeding or hemorrhage  - Monitor labs  - Administer supportive blood products/factors as ordered and appropriate  1/23/2025 1652 by Sayra Le RN  Outcome: Adequate for Discharge  1/23/2025 1652 by Sayra Le RN  Outcome: Progressing     Problem: Nutrition/Hydration-ADULT  Goal: Nutrient/Hydration intake appropriate for improving, restoring or maintaining nutritional needs  Description: Monitor and assess patient's nutrition/hydration status for malnutrition. Collaborate with interdisciplinary team and initiate plan and interventions as ordered.  Monitor patient's weight and dietary intake as ordered or per policy. Utilize nutrition screening tool and intervene as necessary. Determine patient's food preferences and provide high-protein, high-caloric foods as appropriate.     INTERVENTIONS:  - Monitor oral intake, urinary output, labs, and treatment plans  - Assess nutrition and hydration status and recommend course of action  - Evaluate amount of meals eaten  - Assist patient with eating if necessary   - Allow adequate time for meals  - Recommend/ encourage appropriate diets, oral nutritional supplements, and vitamin/mineral supplements  - Order, calculate, and assess calorie counts as needed  - Recommend, monitor, and adjust tube feedings and TPN/PPN based on assessed needs  - Assess need for intravenous fluids  - Provide specific nutrition/hydration education as appropriate  - Include patient/family/caregiver in decisions related to nutrition  1/23/2025 1652 by Sayra Le RN  Outcome: Adequate for Discharge  1/23/2025 1652 by Sayra Le RN  Outcome: Progressing

## 2025-01-23 NOTE — NURSING NOTE
Patient discharged to home Patient was instructed to follow up with PCP in 1-2 weeks. Patients IV was removed and patient was escorted out via wheelchair.

## 2025-01-23 NOTE — CASE MANAGEMENT
Case Management Discharge Planning Note    Patient name Bailey Olsen  Location [facility] 2 /[facility] 2 M* MRN [id number]  : 1970 Date 2025       Current Admission Date: 2025  Current Admission Diagnosis:Acute anemia   Patient Active Problem List    Diagnosis Date Noted Date Diagnosed    Upper GI bleed 2025     Acute anemia 2025     Rectal bleeding 2025     Acute blood loss anemia 2025     Chronic, continuous use of opioids 2025     Acute metabolic encephalopathy 2025     SIRS (systemic inflammatory response syndrome) ([facility]) 2025     Influenza B 2024     Sacral fracture, closed ([facility]) 11/15/2024     KISHORE (acute kidney injury) ([facility]) 10/04/2024     Morbid obesity with BMI of 40.0-44.9, adult ([facility]) 10/04/2024     Pathological fracture of vertebra due to secondary osteoporosis ([facility]) 2024     Fall from standing 2024     Closed compression fracture of L1 lumbar vertebra, sequela 2024     Closed stable burst fracture of first lumbar vertebra with routine healing 2024     Groin hematoma 2024     Ambulatory dysfunction 2024     Intractable back pain 2024     Hyperammonemia ([facility]) 2024     Hepatic encephalopathy ([facility]) 2024     Fall 2024     Decompensated HYATT cirrhosis with hepatic encephalopathy and GI bleed 2024     History of malignant carcinoid tumor of small intestine 2024     Class 3 severe obesity due to excess calories in adult ([facility]) 2024     Primary hypertension 2024     Hypokalemia 2024     Fibromyalgia 2024     Iron deficiency anemia 2024     Pancytopenia ([facility]) 2024     Sarcoidosis 2024     Bilateral pulmonary infiltrates 2024       LOS (days): 5  Geometric Mean LOS (GMLOS) (days): 4.5  Days to GMLOS:-0.4     OBJECTIVE:  Risk of Unplanned Readmission Score: 54.17         Current admission status: Inpatient   Preferred Pharmacy:    [id number] - [place] - [address]  [address] [address]  Phone: [phone] Fax: [phone]    [id number] [place] [facility] [address]  [address] [address]  Phone: [phone] Fax: [phone]    Primary Care Provider: Nayely Brown DO    Primary Insurance: MEDICARE  Secondary Insurance: BLUE CROSS    DISCHARGE DETAILS:    Discharge planning discussed with:: pt  Freedom of Choice: Yes  Comments - Freedom of Choice: Pt agreeable to blanket referrals made to [facility] and will f/u with choice list.                     Requested Home Health Care         Is the patient interested in HHC at discharge?: Yes  Home Health Discipline requested:: Nursing, Occupational Therapy, Physical Therapy  Home Health Agency Name:: Other  Home Health Follow-Up Provider:: PCP  Home Health Services Needed:: Evaluate Functional Status and Safety, Gait/ADL Training, Strengthening/Theraputic Exercises to Improve Function  Homebound Criteria Met:: Requires the Assistance of Another Person for Safe Ambulation or to Leave the Home, Uses an Assist Device (i.e. cane, walker, etc)  Supporting Clincal Findings:: Limited Endurance, Fatigues Easliy in Short Distances                   Treatment Team Recommendation: Home with Home Health Care  Discharge Destination Plan:: Home with Home Health Care                                IMM Given (Date):: 01/23/25  IMM Given to:: Patient (Read IMM, copy given and copy put in bin to be scan. Pt is agreeable to discharge home.)     Additional Comments: LSW covering case today. MD stated pt is refusing IP Rehab and planning on discharging pt home today.  LSW met with this 54 year old and offer IP Rehab Rehab and she has decline. Pt is willing to have [facility] at home and referrals were made. Will f/u with choice list. Pt stated she has walker at home. Pt stated her dtr is coming to  around 1600  today.

## 2025-01-23 NOTE — CASE MANAGEMENT
Case Management Discharge Planning Note    Patient name Bailey Olsen  Location [facility] 2 /[facility] 2 M* MRN [id number]  : 1970 Date 2025       Current Admission Date: 2025  Current Admission Diagnosis:Acute anemia   Patient Active Problem List    Diagnosis Date Noted Date Diagnosed    Upper GI bleed 2025     Acute anemia 2025     Rectal bleeding 2025     Acute blood loss anemia 2025     Chronic, continuous use of opioids 2025     Acute metabolic encephalopathy 2025     SIRS (systemic inflammatory response syndrome) ([facility]) 2025     Influenza B 2024     Sacral fracture, closed ([facility]) 11/15/2024     KISHORE (acute kidney injury) ([facility]) 10/04/2024     Morbid obesity with BMI of 40.0-44.9, adult ([facility]) 10/04/2024     Pathological fracture of vertebra due to secondary osteoporosis ([facility]) 2024     Fall from standing 2024     Closed compression fracture of L1 lumbar vertebra, sequela 2024     Closed stable burst fracture of first lumbar vertebra with routine healing 2024     Groin hematoma 2024     Ambulatory dysfunction 2024     Intractable back pain 2024     Hyperammonemia ([facility]) 2024     Hepatic encephalopathy ([facility]) 2024     Fall 2024     Decompensated HYATT cirrhosis with hepatic encephalopathy and GI bleed 2024     History of malignant carcinoid tumor of small intestine 2024     Class 3 severe obesity due to excess calories in adult ([facility]) 2024     Primary hypertension 2024     Hypokalemia 2024     Fibromyalgia 2024     Iron deficiency anemia 2024     Pancytopenia ([facility]) 2024     Sarcoidosis 2024     Bilateral pulmonary infiltrates 2024       LOS (days): 5  Geometric Mean LOS (GMLOS) (days): 4.5  Days to GMLOS:-0.5     OBJECTIVE:  Risk of Unplanned Readmission Score: 54.17         Current admission status: Inpatient   Preferred Pharmacy:    [id number] - [place] - [address]  [address] [address]  Phone: [phone] Fax: [phone]    [id number] [address] [address]  Phone: [phone] Fax: [phone]    Primary Care Provider: Nayely Brown DO    Primary Insurance: MEDICARE  Secondary Insurance: BLUE CROSS    DISCHARGE DETAILS:      Requested Home Health Care         Home Health Agency Name:: [facility]  HHA External Referral Reason (only applicable if external HHA name selected): Services not provided in network or near patient location            Additional Comments: Pt had [facility] in the past, but when given the choice she has selected [facility] this time. Res in [place].

## 2025-01-24 LAB — BACTERIA BLD CULT: NORMAL

## 2025-01-25 LAB — BACTERIA BLD CULT: NORMAL

## 2025-01-27 NOTE — UTILIZATION REVIEW
NOTIFICATION OF ADMISSION DISCHARGE   This is a Notification of Discharge from [facility]. Please be advised that this patient has been discharge from our facility. Below you will find the admission and discharge date and time including the patient’s disposition.   UTILIZATION REVIEW CONTACT:  April Alston  Utilization   Network Utilization Review Department  Phone: [phone] x carefully listen to the prompts. All voicemails are confidential.  Email: [email or web address]     ADMISSION INFORMATION  PRESENTATION DATE: 1/18/2025  2:12 PM  OBERVATION ADMISSION DATE: N/A  INPATIENT ADMISSION DATE: 1/18/25  2:12 PM   DISCHARGE DATE: 1/23/2025  5:00 PM   DISPOSITION:Home with Home Health Care    Network Utilization Review Department  ATTENTION: Please call with any questions or concerns to [phone] and carefully listen to the prompts so that you are directed to the right person. All voicemails are confidential.   For Discharge needs, contact Care Management DC Support Team at [phone] opt. 2  Send all requests for admission clinical reviews, approved or denied determinations and any other requests to dedicated fax number below belonging to the campus where the patient is receiving treatment. List of dedicated fax numbers for the Facilities:  FACILITY NAME UR FAX NUMBER   ADMISSION DENIALS (Administrative/Medical Necessity) [phone]   DISCHARGE SUPPORT TEAM ([facility]) [phone]   PARENT CHILD HEALTH (Maternity/NICU/Pediatrics) [phone]   [facility] [phone]   [facility] [phone]   [facility] [phone]   [facility] [phone]   [facility] [phone]   [facility] [phone]   [facility] [phone]   [facility]  [phone]   [facility] [phone]   [facility] [phone]   [facility] [phone]   [facility] [phone]

## 2025-02-03 ENCOUNTER — APPOINTMENT (EMERGENCY)
Dept: RADIOLOGY | Facility: HOSPITAL | Age: 55
End: 2025-02-03
Payer: COMMERCIAL

## 2025-02-03 ENCOUNTER — HOSPITAL ENCOUNTER (INPATIENT)
Facility: HOSPITAL | Age: 55
LOS: 10 days | Discharge: HOME HEALTH | End: 2025-02-13
Attending: EMERGENCY MEDICINE | Admitting: FAMILY MEDICINE
Payer: COMMERCIAL

## 2025-02-03 DIAGNOSIS — K76.82 HEPATIC ENCEPHALOPATHY (HCC): ICD-10-CM

## 2025-02-03 DIAGNOSIS — E87.6 HYPOKALEMIA: ICD-10-CM

## 2025-02-03 DIAGNOSIS — R60.1 ANASARCA: Primary | ICD-10-CM

## 2025-02-03 DIAGNOSIS — Z13.9 ENCOUNTER FOR SCREENING INVOLVING SOCIAL DETERMINANTS OF HEALTH (SDOH): ICD-10-CM

## 2025-02-03 DIAGNOSIS — D64.9 ACUTE ANEMIA: ICD-10-CM

## 2025-02-03 DIAGNOSIS — I50.9 CHF (CONGESTIVE HEART FAILURE) (HCC): ICD-10-CM

## 2025-02-03 PROBLEM — K74.60 CIRRHOSIS OF LIVER (HCC): Status: ACTIVE | Noted: 2024-05-26

## 2025-02-03 LAB
2HR DELTA HS TROPONIN: 1 NG/L
4HR DELTA HS TROPONIN: 1 NG/L
ALBUMIN SERPL BCG-MCNC: 2.3 G/DL (ref 3.5–5)
ALP SERPL-CCNC: 245 U/L (ref 34–104)
ALT SERPL W P-5'-P-CCNC: 34 U/L (ref 7–52)
AMMONIA PLAS-SCNC: 77 UMOL/L (ref 18–72)
ANION GAP SERPL CALCULATED.3IONS-SCNC: 4 MMOL/L (ref 4–13)
AST SERPL W P-5'-P-CCNC: 46 U/L (ref 13–39)
BASOPHILS # BLD AUTO: 0.09 THOUSANDS/ÂΜL (ref 0–0.1)
BASOPHILS NFR BLD AUTO: 2 % (ref 0–1)
BILIRUB SERPL-MCNC: 1.36 MG/DL (ref 0.2–1)
BNP SERPL-MCNC: 82 PG/ML (ref 0–100)
BUN SERPL-MCNC: 12 MG/DL (ref 5–25)
CALCIUM ALBUM COR SERPL-MCNC: 8.7 MG/DL (ref 8.3–10.1)
CALCIUM SERPL-MCNC: 7.3 MG/DL (ref 8.4–10.2)
CARDIAC TROPONIN I PNL SERPL HS: 10 NG/L (ref ?–50)
CARDIAC TROPONIN I PNL SERPL HS: 11 NG/L (ref ?–50)
CARDIAC TROPONIN I PNL SERPL HS: 11 NG/L (ref ?–50)
CHLORIDE SERPL-SCNC: 95 MMOL/L (ref 96–108)
CO2 SERPL-SCNC: 37 MMOL/L (ref 21–32)
CREAT SERPL-MCNC: 1.16 MG/DL (ref 0.6–1.3)
EOSINOPHIL # BLD AUTO: 0.12 THOUSAND/ÂΜL (ref 0–0.61)
EOSINOPHIL NFR BLD AUTO: 3 % (ref 0–6)
ERYTHROCYTE [DISTWIDTH] IN BLOOD BY AUTOMATED COUNT: 17.7 % (ref 11.6–15.1)
GFR SERPL CREATININE-BSD FRML MDRD: 53 ML/MIN/1.73SQ M
GLUCOSE SERPL-MCNC: 96 MG/DL (ref 65–140)
HCT VFR BLD AUTO: 26.5 % (ref 34.8–46.1)
HGB BLD-MCNC: 8.1 G/DL (ref 11.5–15.4)
IMM GRANULOCYTES # BLD AUTO: 0.01 THOUSAND/UL (ref 0–0.2)
IMM GRANULOCYTES NFR BLD AUTO: 0 % (ref 0–2)
LYMPHOCYTES # BLD AUTO: 1.26 THOUSANDS/ÂΜL (ref 0.6–4.47)
LYMPHOCYTES NFR BLD AUTO: 26 % (ref 14–44)
MCH RBC QN AUTO: 29.5 PG (ref 26.8–34.3)
MCHC RBC AUTO-ENTMCNC: 30.6 G/DL (ref 31.4–37.4)
MCV RBC AUTO: 96 FL (ref 82–98)
MONOCYTES # BLD AUTO: 0.32 THOUSAND/ÂΜL (ref 0.17–1.22)
MONOCYTES NFR BLD AUTO: 7 % (ref 4–12)
NEUTROPHILS # BLD AUTO: 3.06 THOUSANDS/ÂΜL (ref 1.85–7.62)
NEUTS SEG NFR BLD AUTO: 62 % (ref 43–75)
NRBC BLD AUTO-RTO: 0 /100 WBCS
PLATELET # BLD AUTO: 104 THOUSANDS/UL (ref 149–390)
PMV BLD AUTO: 10.2 FL (ref 8.9–12.7)
POTASSIUM SERPL-SCNC: 2.6 MMOL/L (ref 3.5–5.3)
PROT SERPL-MCNC: 5.7 G/DL (ref 6.4–8.4)
RBC # BLD AUTO: 2.75 MILLION/UL (ref 3.81–5.12)
SODIUM SERPL-SCNC: 136 MMOL/L (ref 135–147)
WBC # BLD AUTO: 4.86 THOUSAND/UL (ref 4.31–10.16)

## 2025-02-03 PROCEDURE — 99285 EMERGENCY DEPT VISIT HI MDM: CPT | Performed by: EMERGENCY MEDICINE

## 2025-02-03 PROCEDURE — 82140 ASSAY OF AMMONIA: CPT | Performed by: EMERGENCY MEDICINE

## 2025-02-03 PROCEDURE — 84484 ASSAY OF TROPONIN QUANT: CPT | Performed by: EMERGENCY MEDICINE

## 2025-02-03 PROCEDURE — 80048 BASIC METABOLIC PNL TOTAL CA: CPT

## 2025-02-03 PROCEDURE — 96374 THER/PROPH/DIAG INJ IV PUSH: CPT

## 2025-02-03 PROCEDURE — 36415 COLL VENOUS BLD VENIPUNCTURE: CPT | Performed by: EMERGENCY MEDICINE

## 2025-02-03 PROCEDURE — 99285 EMERGENCY DEPT VISIT HI MDM: CPT

## 2025-02-03 PROCEDURE — 99223 1ST HOSP IP/OBS HIGH 75: CPT

## 2025-02-03 PROCEDURE — 83880 ASSAY OF NATRIURETIC PEPTIDE: CPT | Performed by: EMERGENCY MEDICINE

## 2025-02-03 PROCEDURE — 85025 COMPLETE CBC W/AUTO DIFF WBC: CPT | Performed by: EMERGENCY MEDICINE

## 2025-02-03 PROCEDURE — 84484 ASSAY OF TROPONIN QUANT: CPT

## 2025-02-03 PROCEDURE — 80053 COMPREHEN METABOLIC PANEL: CPT | Performed by: EMERGENCY MEDICINE

## 2025-02-03 PROCEDURE — 93005 ELECTROCARDIOGRAM TRACING: CPT

## 2025-02-03 PROCEDURE — 71045 X-RAY EXAM CHEST 1 VIEW: CPT

## 2025-02-03 RX ORDER — POLYETHYLENE GLYCOL 3350 17 G/17G
17 POWDER, FOR SOLUTION ORAL DAILY PRN
Status: DISCONTINUED | OUTPATIENT
Start: 2025-02-03 | End: 2025-02-11

## 2025-02-03 RX ORDER — NYSTATIN 100000 [USP'U]/G
POWDER TOPICAL 2 TIMES DAILY
Status: DISCONTINUED | OUTPATIENT
Start: 2025-02-03 | End: 2025-02-13 | Stop reason: HOSPADM

## 2025-02-03 RX ORDER — TORSEMIDE 20 MG/1
20 TABLET ORAL 2 TIMES DAILY
Status: DISCONTINUED | OUTPATIENT
Start: 2025-02-03 | End: 2025-02-04

## 2025-02-03 RX ORDER — LACTULOSE 10 G/15ML
30 SOLUTION ORAL 4 TIMES DAILY
Status: DISCONTINUED | OUTPATIENT
Start: 2025-02-03 | End: 2025-02-06

## 2025-02-03 RX ORDER — POTASSIUM CHLORIDE 14.9 MG/ML
20 INJECTION INTRAVENOUS ONCE
Status: COMPLETED | OUTPATIENT
Start: 2025-02-03 | End: 2025-02-03

## 2025-02-03 RX ORDER — DAPSONE 25 MG/1
50 TABLET ORAL DAILY
Status: DISCONTINUED | OUTPATIENT
Start: 2025-02-04 | End: 2025-02-13 | Stop reason: HOSPADM

## 2025-02-03 RX ORDER — OXYCODONE HYDROCHLORIDE 10 MG/1
10 TABLET ORAL ONCE
Refills: 0 | Status: COMPLETED | OUTPATIENT
Start: 2025-02-03 | End: 2025-02-03

## 2025-02-03 RX ORDER — POTASSIUM CHLORIDE 1500 MG/1
40 TABLET, EXTENDED RELEASE ORAL ONCE
Status: COMPLETED | OUTPATIENT
Start: 2025-02-03 | End: 2025-02-03

## 2025-02-03 RX ORDER — URSODIOL 300 MG/1
300 CAPSULE ORAL 2 TIMES DAILY
COMMUNITY

## 2025-02-03 RX ORDER — URSODIOL 300 MG/1
300 CAPSULE ORAL 2 TIMES DAILY
Status: DISCONTINUED | OUTPATIENT
Start: 2025-02-03 | End: 2025-02-13 | Stop reason: HOSPADM

## 2025-02-03 RX ORDER — OXYCODONE HYDROCHLORIDE 10 MG/1
10 TABLET ORAL EVERY 6 HOURS PRN
Refills: 0 | Status: DISCONTINUED | OUTPATIENT
Start: 2025-02-03 | End: 2025-02-13 | Stop reason: HOSPADM

## 2025-02-03 RX ORDER — SPIRONOLACTONE 25 MG/1
50 TABLET ORAL DAILY
Status: DISCONTINUED | OUTPATIENT
Start: 2025-02-03 | End: 2025-02-04

## 2025-02-03 RX ORDER — FOLIC ACID 1 MG/1
1 TABLET ORAL DAILY
Status: DISCONTINUED | OUTPATIENT
Start: 2025-02-04 | End: 2025-02-13 | Stop reason: HOSPADM

## 2025-02-03 RX ORDER — DAPSONE 25 MG/1
50 TABLET ORAL DAILY
COMMUNITY

## 2025-02-03 RX ORDER — ACETAMINOPHEN 325 MG/1
500 TABLET ORAL EVERY 4 HOURS PRN
Status: DISCONTINUED | OUTPATIENT
Start: 2025-02-03 | End: 2025-02-13 | Stop reason: HOSPADM

## 2025-02-03 RX ORDER — CARVEDILOL 3.12 MG/1
3.12 TABLET ORAL 2 TIMES DAILY WITH MEALS
Status: DISCONTINUED | OUTPATIENT
Start: 2025-02-04 | End: 2025-02-04

## 2025-02-03 RX ORDER — SERTRALINE HYDROCHLORIDE 100 MG/1
100 TABLET, FILM COATED ORAL DAILY
Status: DISCONTINUED | OUTPATIENT
Start: 2025-02-04 | End: 2025-02-13 | Stop reason: HOSPADM

## 2025-02-03 RX ORDER — CYCLOBENZAPRINE HCL 10 MG
10 TABLET ORAL 3 TIMES DAILY PRN
Status: DISCONTINUED | OUTPATIENT
Start: 2025-02-03 | End: 2025-02-13 | Stop reason: HOSPADM

## 2025-02-03 RX ORDER — LIDOCAINE 50 MG/G
1 PATCH TOPICAL DAILY
Status: DISCONTINUED | OUTPATIENT
Start: 2025-02-04 | End: 2025-02-13 | Stop reason: HOSPADM

## 2025-02-03 RX ORDER — ONDANSETRON 2 MG/ML
4 INJECTION INTRAMUSCULAR; INTRAVENOUS EVERY 6 HOURS PRN
Status: DISCONTINUED | OUTPATIENT
Start: 2025-02-03 | End: 2025-02-03

## 2025-02-03 RX ORDER — HEPARIN SODIUM 5000 [USP'U]/ML
5000 INJECTION, SOLUTION INTRAVENOUS; SUBCUTANEOUS EVERY 8 HOURS SCHEDULED
Status: DISCONTINUED | OUTPATIENT
Start: 2025-02-03 | End: 2025-02-05

## 2025-02-03 RX ORDER — OCTREOTIDE ACETATE 30 MG
30 KIT INTRAMUSCULAR
COMMUNITY
End: 2025-02-13

## 2025-02-03 RX ADMIN — POTASSIUM CHLORIDE 40 MEQ: 1500 TABLET, EXTENDED RELEASE ORAL at 19:28

## 2025-02-03 RX ADMIN — LACTULOSE 30 G: 20 SOLUTION ORAL at 22:49

## 2025-02-03 RX ADMIN — NYSTATIN: 100000 POWDER TOPICAL at 22:45

## 2025-02-03 RX ADMIN — HEPARIN SODIUM 5000 UNITS: 5000 INJECTION INTRAVENOUS; SUBCUTANEOUS at 22:45

## 2025-02-03 RX ADMIN — RIFAXIMIN 550 MG: 550 TABLET ORAL at 22:41

## 2025-02-03 RX ADMIN — URSODIOL 300 MG: 300 CAPSULE ORAL at 22:40

## 2025-02-03 RX ADMIN — OXYCODONE HYDROCHLORIDE 10 MG: 10 TABLET ORAL at 19:27

## 2025-02-03 RX ADMIN — AMITRIPTYLINE HYDROCHLORIDE 75 MG: 25 TABLET, FILM COATED ORAL at 22:40

## 2025-02-03 RX ADMIN — TORSEMIDE 20 MG: 20 TABLET ORAL at 22:46

## 2025-02-03 RX ADMIN — SPIRONOLACTONE 50 MG: 25 TABLET ORAL at 22:46

## 2025-02-03 RX ADMIN — POTASSIUM CHLORIDE 20 MEQ: 14.9 INJECTION, SOLUTION INTRAVENOUS at 19:29

## 2025-02-03 NOTE — ED PROVIDER NOTES
Time reflects when diagnosis was documented in both MDM as applicable and the Disposition within this note       Time User Action Codes Description Comment    2/3/2025  7:42 PM Arabella Mei Add [R60.1] Anasarca     2/3/2025  7:42 PM Arabella Mei Add [E87.6] Hypokalemia     2/3/2025  9:03 PM Graham Jeanine Miller Add [Z13.9] Encounter for screening involving social determinants of health (SDoH)           ED Disposition       ED Disposition   Admit    Condition   Stable    Date/Time   Mon Feb 3, 2025  7:42 PM    Comment                  Assessment & Plan       Medical Decision Making  [place] diagnosis includes but not limited to: Peripheral edema, edema, CHF, anasarca    Amount and/or Complexity of Data Reviewed  Labs: ordered.  Radiology: ordered.    Risk  Prescription drug management.  Decision regarding hospitalization.        ED Course as of 02/03/25 2320   Mon Feb 03, 2025 1942 Case was discussed with the hospitalist who accept the patient for admission.       Medications   oxyCODONE (ROXICODONE) immediate release tablet 10 mg (10 mg Oral Given 2/3/25 1927)   potassium chloride 20 mEq IVPB (premix) (20 mEq Intravenous New Bag 2/3/25 1929)   potassium chloride (Klor-Con M20) CR tablet 40 mEq (40 mEq Oral Given 2/3/25 1928)       ED Risk Strat Scores                                              History of Present Illness       Chief Complaint   Patient presents with    Edema     Patient reports 30 lb weight gain in two weeks. Sent to ED by cardiologist. Reports recent hospitalization for rectal bleeding.        Past Medical History:   Diagnosis Date    Back pain     Carcinoid tumor     neuroendocrine    CHF (congestive heart failure) (HCC)     Cirrhosis of liver (HCC)     Sarcoidosis       Past Surgical History:   Procedure Laterality Date    CHOLECYSTECTOMY      GASTRIC BYPASS      HERNIA REPAIR      IR KYPHOPLASTY/VERTEBROPLASTY  10/15/2024      Family History   Problem Relation Age of Onset     Hypertension Father       Social History     Tobacco Use    Smoking status: Never    Smokeless tobacco: Never   Vaping Use    Vaping status: Never Used   Substance Use Topics    Alcohol use: Never    Drug use: Never      E-Cigarette/Vaping    E-Cigarette Use Never User       E-Cigarette/Vaping Substances      I have reviewed and agree with the history as documented.     This is a 54-year-old female presenting to the ED for evaluation of 30 pound weight gain in past 2 weeks.  Patient was seen outpatient at her cardiologist but Dr. Morris who sent the patient to the ED for evaluation.  She denies any chest pain or shortness of breath.  Patient states that she was recently hospitalized 2 weeks ago for acute GI bleed.        Review of Systems   Constitutional:  Negative for chills and fever.   HENT:  Negative for ear pain and sore throat.    Eyes:  Negative for pain and visual disturbance.   Respiratory:  Negative for cough and shortness of breath.    Cardiovascular:  Positive for leg swelling. Negative for chest pain and palpitations.   Gastrointestinal:  Negative for abdominal pain and vomiting.   Genitourinary:  Negative for dysuria and hematuria.   Musculoskeletal:  Negative for arthralgias and back pain.   Skin:  Negative for color change and rash.   Neurological:  Negative for seizures and syncope.   All other systems reviewed and are negative.          Objective       ED Triage Vitals [02/03/25 1550]   Temperature Pulse Blood Pressure Respirations SpO2 Patient Position - Orthostatic VS   98 °F (36.7 °C) 85 125/79 16 96 % Sitting      Temp Source Heart Rate Source BP Location FiO2 (%) Pain Score    Temporal Monitor Left arm -- 6      Vitals      Date and Time Temp Pulse SpO2 Resp BP Pain Score FACES Pain Rating User   02/03/25 2256 -- 89 98 % -- 119/51 -- -- DII   02/03/25 2011 98.6 °F (37 °C) 91 99 % -- 167/84 -- -- DII   02/03/25 2011 -- -- -- -- -- 3 -- CMK   02/03/25 2010 -- -- -- -- 167/84 -- -- DII    02/03/25 1927 -- -- -- -- -- 7 -- AL   02/03/25 1845 -- -- -- -- 110/54 -- --    02/03/25 1830 -- 89 97 % -- 114/54 -- --    02/03/25 1815 -- 86 99 % -- 118/55 -- --    02/03/25 1800 -- 88 100 % -- 130/67 -- --    02/03/25 1745 -- 90 92 % -- -- -- --    02/03/25 1730 -- 88 100 % -- 131/59 -- -- MB   02/03/25 1715 -- 94 92 % -- 151/70 -- --    02/03/25 1700 -- 86 98 % -- 142/66 -- -- MB   02/03/25 1645 -- 92 95 % -- -- -- --    02/03/25 1630 -- 86 99 % -- 130/64 -- --    02/03/25 1550 98 °F (36.7 °C) 85 96 % 16 125/79 6 -- SBE            Physical Exam  Vitals and nursing note reviewed.   Constitutional:       General: She is not in acute distress.     Appearance: Normal appearance. She is well-developed. She is obese.   HENT:      Head: Normocephalic and atraumatic.      Right Ear: External ear normal.      Left Ear: External ear normal.      Nose: Nose normal.      Mouth/Throat:      Mouth: Mucous membranes are moist.      Comments: Moderate submandibular edema  Eyes:      Extraocular Movements: Extraocular movements intact.      Conjunctiva/sclera: Conjunctivae normal.   Cardiovascular:      Rate and Rhythm: Normal rate and regular rhythm.      Pulses: Normal pulses.      Heart sounds: Normal heart sounds. No murmur heard.  Pulmonary:      Effort: Pulmonary effort is normal. No respiratory distress.      Breath sounds: Normal breath sounds.   Abdominal:      General: Abdomen is flat. Bowel sounds are normal. There is distension.      Palpations: Abdomen is soft. There is no mass.      Tenderness: There is no abdominal tenderness. There is no left CVA tenderness, guarding or rebound.      Hernia: No hernia is present.   Musculoskeletal:         General: Swelling present. No tenderness.      Cervical back: Normal range of motion and neck supple.      Right lower leg: Edema present.      Left lower leg: Edema present.      Comments: Marked bilateral lower extremity edema from distal foot up to  bilateral thighs   Skin:     General: Skin is warm and dry.      Capillary Refill: Capillary refill takes less than 2 seconds.   Neurological:      General: No focal deficit present.      Mental Status: She is alert and oriented to person, place, and time. Mental status is at baseline.   Psychiatric:         Mood and Affect: Mood normal.         Results Reviewed       Procedure Component Value Units Date/Time    HS Troponin I 4hr [[id number]]  (Normal) Collected: 02/03/25 2207    Lab Status: Final result Specimen: Blood from Arm, Right Updated: 02/03/25 2247     hs TnI 4hr 11 ng/L      Delta 4hr hsTnI 1 ng/L     HS Troponin I 2hr [[id number]]  (Normal) Collected: 02/03/25 1936    Lab Status: Final result Specimen: Blood from Arm, Left Updated: 02/03/25 2010     hs TnI 2hr 11 ng/L      Delta 2hr hsTnI 1 ng/L     B-Type Natriuretic Peptide(BNP) [[id number]]  (Normal) Collected: 02/03/25 1734    Lab Status: Final result Specimen: Blood from Arm, Left Updated: 02/03/25 1805     BNP 82 pg/mL     HS Troponin 0hr (reflex protocol) [[id number]]  (Normal) Collected: 02/03/25 1734    Lab Status: Final result Specimen: Blood from Arm, Left Updated: 02/03/25 1804     hs TnI 0hr 10 ng/L     Comprehensive metabolic panel [[id number]]  (Abnormal) Collected: 02/03/25 1734    Lab Status: Final result Specimen: Blood from Arm, Left Updated: 02/03/25 1756     Sodium 136 mmol/L      Potassium 2.6 mmol/L      Chloride 95 mmol/L      CO2 37 mmol/L      ANION GAP 4 mmol/L      BUN 12 mg/dL      Creatinine 1.16 mg/dL      Glucose 96 mg/dL      Calcium 7.3 mg/dL      Corrected Calcium 8.7 mg/dL      AST 46 U/L      ALT 34 U/L      Alkaline Phosphatase 245 U/L      Total Protein 5.7 g/dL      Albumin 2.3 g/dL      Total Bilirubin 1.36 mg/dL      eGFR 53 ml/min/1.73sq m     Narrative:      National Kid[employer] guidelines for Chronic Kidney Disease (CKD):     Stage 1 with normal or high GFR (GFR > 90 mL/min/1.73 square meters)     Stage 2 Mild CKD (GFR = 60-89 mL/min/1.73 square meters)    Stage 3A Moderate CKD (GFR = 45-59 mL/min/1.73 square meters)    Stage 3B Moderate CKD (GFR = 30-44 mL/min/1.73 square meters)    Stage 4 Severe CKD (GFR = 15-29 mL/min/1.73 square meters)    Stage 5 End Stage CKD (GFR <15 mL/min/1.73 square meters)  Note: GFR calculation is accurate only with a steady state creatinine    Ammonia [[id number]]  (Abnormal) Collected: 02/03/25 1734    Lab Status: Final result Specimen: Blood from Arm, Left Updated: 02/03/25 1756     Ammonia 77 umol/L     CBC and differential [[id number]]  (Abnormal) Collected: 02/03/25 1734    Lab Status: Final result Specimen: Blood from Arm, Left Updated: 02/03/25 1749     WBC 4.86 Thousand/uL      RBC 2.75 Million/uL      Hemoglobin 8.1 g/dL      Hematocrit 26.5 %      MCV 96 fL      MCH 29.5 pg      MCHC 30.6 g/dL      RDW 17.7 %      MPV 10.2 fL      Platelets 104 Thousands/uL      nRBC 0 /100 WBCs      Segmented % 62 %      Immature Grans % 0 %      Lymphocytes % 26 %      Monocytes % 7 %      Eosinophils Relative 3 %      Basophils Relative 2 %      Absolute Neutrophils 3.06 Thousands/µL      Absolute Immature Grans 0.01 Thousand/uL      Absolute Lymphocytes 1.26 Thousands/µL      Absolute Monocytes 0.32 Thousand/µL      Eosinophils Absolute 0.12 Thousand/µL      Basophils Absolute 0.09 Thousands/µL             XR chest 1 view portable   Final Interpretation by Sreekanth Alexander MD (02/03 1916)      Mild central pulmonary vascular prominence. No suspicious infiltrates are seen.            Workstation performed: [id number]             ECG 12 Lead Documentation Only    Date/Time: 2/3/2025 7:27 PM    Performed by: Arabella Mei DO  Authorized by: Arabella Mei DO    Indications / Diagnosis:  Hypokalemia  Patient location:  ED  Interpretation:     Interpretation: normal    Rate:     ECG rate:  91    ECG rate assessment: normal    Rhythm:     Rhythm: sinus rhythm     Ectopy:     Ectopy: none    QRS:     QRS axis:  Normal    QRS intervals:  Normal  Conduction:     Conduction: normal    ST segments:     ST segments:  Normal  T waves:     T waves: normal        ED Medication and Procedure Management   Prior to Admission Medications   Prescriptions Last Dose Informant Patient Reported? Taking?   amitriptyline (ELAVIL) 75 mg tablet 2025 Bedtime Pharmacy (Specify) Yes Yes   Sig: Take 75 mg by mouth daily at bedtime   aspirin-acetaminophen-caffeine (EXCEDRIN MIGRAINE) 250-250-65 MG per tablet Past Month  Yes Yes   Sig: Take 1 tablet by mouth every 6 (six) hours as needed for headaches   carvedilol (COREG) 3.125 mg tablet 2/3/2025  No Yes   Sig: Take 1 tablet (3.125 mg total) by mouth 2 (two) times a day with meals   cyclobenzaprine (FLEXERIL) 10 mg tablet 2/3/2025 Pharmacy (Specify) Yes Yes   Sig: Take 10 mg by mouth 3 (three) times a day as needed for muscle spasms   dapsone 25 mg tablet   Yes Yes   Sig: Take 50 mg by mouth daily   folic acid (FOLVITE) 1 mg tablet 2/3/2025  Yes Yes   Sig: Take 1 mg by mouth daily   gabapentin (NEURONTIN) 100 mg capsule Not Taking Pharmacy (Specify) No No   Sig: Take 2 capsules (200 mg total) by mouth 3 (three) times a day   Patient not taking: Reported on 2/3/2025   lactulose (CHRONULAC) 10 g/15 mL solution 2/3/2025 Noon Pharmacy (Specify) No Yes   Sig: Take 45 mL (30 g total) by mouth 3 (three) times a day   Patient taking differently: Take 30 g by mouth 4 (four) times a day   lidocaine (LIDODERM) 5 % Past Week Pharmacy (Specify) No Yes   Sig: Apply 1 patch topically over 12 hours daily Remove & Discard patch within 12 hours or as directed by MD   naloxone (NARCAN) 4 mg/0.1 mL nasal spray  Pharmacy (Specify) Yes No   Si spray by Alternating Nares route every 3 (three) minutes as needed for opioid reversal or respiratory depression Administer 1 spray into a nostril. If no response after 2-3 minutes, give another dose in the other nostril  using a new spray.   nystatin (MYCOSTATIN) powder   No No   Sig: Apply topically 2 (two) times a day   octreotide (SandoSTATIN LAR DEPOT) 30 mg IM injection kit  Self Yes Yes   Sig: Inject 30 mg into a muscle every 28 days   oxyCODONE (ROXICODONE) 10 MG TABS 2/3/2025 Morning Pharmacy (Specify) Yes Yes   Sig: Take 10 mg by mouth every 6 (six) hours as needed for moderate pain   oxyCODONE-acetaminophen (PERCOCET)  mg per tablet Not Taking Pharmacy (Specify) Yes No   Sig: Take 1 tablet by mouth every 6 (six) hours as needed for moderate pain   Patient not taking: Reported on 2/3/2025   pantoprazole (PROTONIX) 40 mg tablet Not Taking  No No   Sig: Take 1 tablet (40 mg total) by mouth daily in the early morning   Patient not taking: Reported on 2/3/2025   polyethylene glycol (MIRALAX) 17 g packet Past Week Pharmacy (Specify) Yes Yes   Sig: Take 17 g by mouth daily as needed   rifaximin (XIFAXAN) 550 mg tablet 2/3/2025  Yes Yes   Sig: Take 550 mg by mouth every 12 (twelve) hours   sertraline (ZOLOFT) 100 mg tablet 2/3/2025  No Yes   Sig: Take 1 tablet (100 mg total) by mouth daily   spironolactone (ALDACTONE) 50 mg tablet 2/3/2025 Self No Yes   Sig: Take 1 tablet (50 mg total) by mouth daily   Patient taking differently: Take 25 mg by mouth daily   torsemide (DEMADEX) 20 mg tablet 2/3/2025 Morning Pharmacy (Specify) No Yes   Sig: Take 1 tablet (20 mg total) by mouth daily   Patient taking differently: Take 20 mg by mouth 2 (two) times a day   ursodiol (ACTIGALL) 300 mg capsule   Yes Yes   Sig: Take 300 mg by mouth 2 (two) times a day      Facility-Administered Medications: None     Current Discharge Medication List        CONTINUE these medications which have NOT CHANGED    Details   amitriptyline (ELAVIL) 75 mg tablet Take 75 mg by mouth daily at bedtime      aspirin-acetaminophen-caffeine (EXCEDRIN MIGRAINE) 250-250-65 MG per tablet Take 1 tablet by mouth every 6 (six) hours as needed for headaches      carvedilol  (COREG) 3.125 mg tablet Take 1 tablet (3.125 mg total) by mouth 2 (two) times a day with meals  Qty: 60 tablet, Refills: 0    Associated Diagnoses: Rectal bleeding      cyclobenzaprine (FLEXERIL) 10 mg tablet Take 10 mg by mouth 3 (three) times a day as needed for muscle spasms      dapsone 25 mg tablet Take 50 mg by mouth daily      folic acid (FOLVITE) 1 mg tablet Take 1 mg by mouth daily      lactulose (CHRONULAC) 10 g/15 mL solution Take 45 mL (30 g total) by mouth 3 (three) times a day  Qty: 240 mL, Refills: 0    Associated Diagnoses: Hyperammonemia (HCC)      lidocaine (LIDODERM) 5 % Apply 1 patch topically over 12 hours daily Remove & Discard patch within 12 hours or as directed by MD  Qty: 9 patch, Refills: 0    Associated Diagnoses: Closed compression fracture of body of L1 vertebra ([facility]); Sacral fracture ([facility])      octreotide (SandoSTATIN LAR DEPOT) 30 mg IM injection kit Inject 30 mg into a muscle every 28 days      oxyCODONE (ROXICODONE) 10 MG TABS Take 10 mg by mouth every 6 (six) hours as needed for moderate pain      polyethylene glycol (MIRALAX) 17 g packet Take 17 g by mouth daily as needed      rifaximin (XIFAXAN) 550 mg tablet Take 550 mg by mouth every 12 (twelve) hours      sertraline (ZOLOFT) 100 mg tablet Take 1 tablet (100 mg total) by mouth daily  Qty: 30 tablet, Refills: 0    Associated Diagnoses: Sarcoidosis      spironolactone (ALDACTONE) 50 mg tablet Take 1 tablet (50 mg total) by mouth daily  Qty: 30 tablet, Refills: 0    Associated Diagnoses: CHF (congestive heart failure) ([facility])      torsemide (DEMADEX) 20 mg tablet Take 1 tablet (20 mg total) by mouth daily  Qty: 30 tablet, Refills: 0    Associated Diagnoses: CHF (congestive heart failure) ([facility])      ursodiol (ACTIGALL) 300 mg capsule Take 300 mg by mouth 2 (two) times a day      gabapentin (NEURONTIN) 100 mg capsule Take 2 capsules (200 mg total) by mouth 3 (three) times a day  Qty: 180 capsule, Refills: 0    Associated Diagnoses:  Sarcoidosis      naloxone (NARCAN) 4 mg/0.1 mL nasal spray 1 spray by Alternating Nares route every 3 (three) minutes as needed for opioid reversal or respiratory depression Administer 1 spray into a nostril. If no response after 2-3 minutes, give another dose in the other nostril using a new spray.      nystatin (MYCOSTATIN) powder Apply topically 2 (two) times a day  Qty: 30 g, Refills: 0    Associated Diagnoses: Closed compression fracture of L1 lumbar vertebra, sequela      oxyCODONE-acetaminophen (PERCOCET)  mg per tablet Take 1 tablet by mouth every 6 (six) hours as needed for moderate pain      pantoprazole (PROTONIX) 40 mg tablet Take 1 tablet (40 mg total) by mouth daily in the early morning  Qty: 30 tablet, Refills: 0    Associated Diagnoses: Hepatic encephalopathy (HCC)           No discharge procedures on file.  ED SEPSIS DOCUMENTATION   Time reflects when diagnosis was documented in both MDM as applicable and the Disposition within this note       Time User Action Codes Description Comment    2/3/2025  7:42 PM Arabella Mei [R60.1] Anasarca     2/3/2025  7:42 PM Arabella Mei [E87.6] Hypokalemia     2/3/2025  9:03 PM Jeanine Stevenson [Z13.9] Encounter for screening involving social determinants of health (SDoH)                  Arabella Mei DO  02/03/25 5965

## 2025-02-04 ENCOUNTER — APPOINTMENT (INPATIENT)
Dept: ULTRASOUND IMAGING | Facility: HOSPITAL | Age: 55
End: 2025-02-04
Payer: COMMERCIAL

## 2025-02-04 PROBLEM — K75.81 LIVER CIRRHOSIS SECONDARY TO NASH (NONALCOHOLIC STEATOHEPATITIS) (HCC): Status: ACTIVE | Noted: 2024-05-26

## 2025-02-04 PROBLEM — R60.1 ANASARCA: Status: RESOLVED | Noted: 2025-02-03 | Resolved: 2025-02-04

## 2025-02-04 LAB
ANION GAP SERPL CALCULATED.3IONS-SCNC: 2 MMOL/L (ref 4–13)
ANION GAP SERPL CALCULATED.3IONS-SCNC: 3 MMOL/L (ref 4–13)
ANION GAP SERPL CALCULATED.3IONS-SCNC: 3 MMOL/L (ref 4–13)
ANION GAP SERPL CALCULATED.3IONS-SCNC: 5 MMOL/L (ref 4–13)
ATRIAL RATE: 91 BPM
BUN SERPL-MCNC: 11 MG/DL (ref 5–25)
BUN SERPL-MCNC: 11 MG/DL (ref 5–25)
BUN SERPL-MCNC: 12 MG/DL (ref 5–25)
BUN SERPL-MCNC: 12 MG/DL (ref 5–25)
CALCIUM SERPL-MCNC: 6.8 MG/DL (ref 8.4–10.2)
CALCIUM SERPL-MCNC: 7 MG/DL (ref 8.4–10.2)
CALCIUM SERPL-MCNC: 7.2 MG/DL (ref 8.4–10.2)
CALCIUM SERPL-MCNC: 7.3 MG/DL (ref 8.4–10.2)
CHLORIDE SERPL-SCNC: 96 MMOL/L (ref 96–108)
CHLORIDE SERPL-SCNC: 97 MMOL/L (ref 96–108)
CO2 SERPL-SCNC: 38 MMOL/L (ref 21–32)
CO2 SERPL-SCNC: 38 MMOL/L (ref 21–32)
CO2 SERPL-SCNC: 39 MMOL/L (ref 21–32)
CO2 SERPL-SCNC: 39 MMOL/L (ref 21–32)
CREAT SERPL-MCNC: 1.06 MG/DL (ref 0.6–1.3)
CREAT SERPL-MCNC: 1.19 MG/DL (ref 0.6–1.3)
CREAT SERPL-MCNC: 1.19 MG/DL (ref 0.6–1.3)
CREAT SERPL-MCNC: 1.27 MG/DL (ref 0.6–1.3)
ERYTHROCYTE [DISTWIDTH] IN BLOOD BY AUTOMATED COUNT: 17.8 % (ref 11.6–15.1)
GFR SERPL CREATININE-BSD FRML MDRD: 47 ML/MIN/1.73SQ M
GFR SERPL CREATININE-BSD FRML MDRD: 51 ML/MIN/1.73SQ M
GFR SERPL CREATININE-BSD FRML MDRD: 51 ML/MIN/1.73SQ M
GFR SERPL CREATININE-BSD FRML MDRD: 59 ML/MIN/1.73SQ M
GLUCOSE SERPL-MCNC: 104 MG/DL (ref 65–140)
GLUCOSE SERPL-MCNC: 105 MG/DL (ref 65–140)
GLUCOSE SERPL-MCNC: 179 MG/DL (ref 65–140)
GLUCOSE SERPL-MCNC: 222 MG/DL (ref 65–140)
HCT VFR BLD AUTO: 23 % (ref 34.8–46.1)
HGB BLD-MCNC: 7.2 G/DL (ref 11.5–15.4)
MAGNESIUM SERPL-MCNC: 1.4 MG/DL (ref 1.9–2.7)
MCH RBC QN AUTO: 30.4 PG (ref 26.8–34.3)
MCHC RBC AUTO-ENTMCNC: 31.3 G/DL (ref 31.4–37.4)
MCV RBC AUTO: 97 FL (ref 82–98)
P AXIS: 53 DEGREES
PLATELET # BLD AUTO: 75 THOUSANDS/UL (ref 149–390)
PMV BLD AUTO: 10.5 FL (ref 8.9–12.7)
POTASSIUM SERPL-SCNC: 2.4 MMOL/L (ref 3.5–5.3)
POTASSIUM SERPL-SCNC: 2.7 MMOL/L (ref 3.5–5.3)
POTASSIUM SERPL-SCNC: 3 MMOL/L (ref 3.5–5.3)
POTASSIUM SERPL-SCNC: 3.2 MMOL/L (ref 3.5–5.3)
PR INTERVAL: 190 MS
QRS AXIS: 11 DEGREES
QRSD INTERVAL: 100 MS
QT INTERVAL: 434 MS
QTC INTERVAL: 533 MS
RBC # BLD AUTO: 2.37 MILLION/UL (ref 3.81–5.12)
SODIUM SERPL-SCNC: 137 MMOL/L (ref 135–147)
SODIUM SERPL-SCNC: 138 MMOL/L (ref 135–147)
SODIUM SERPL-SCNC: 139 MMOL/L (ref 135–147)
SODIUM SERPL-SCNC: 140 MMOL/L (ref 135–147)
T WAVE AXIS: 47 DEGREES
VENTRICULAR RATE: 91 BPM
WBC # BLD AUTO: 2.3 THOUSAND/UL (ref 4.31–10.16)

## 2025-02-04 PROCEDURE — 80048 BASIC METABOLIC PNL TOTAL CA: CPT | Performed by: FAMILY MEDICINE

## 2025-02-04 PROCEDURE — 80048 BASIC METABOLIC PNL TOTAL CA: CPT

## 2025-02-04 PROCEDURE — 83735 ASSAY OF MAGNESIUM: CPT | Performed by: FAMILY MEDICINE

## 2025-02-04 PROCEDURE — 76705 ECHO EXAM OF ABDOMEN: CPT

## 2025-02-04 PROCEDURE — 85027 COMPLETE CBC AUTOMATED: CPT

## 2025-02-04 PROCEDURE — 99232 SBSQ HOSP IP/OBS MODERATE 35: CPT | Performed by: FAMILY MEDICINE

## 2025-02-04 RX ORDER — MAGNESIUM SULFATE HEPTAHYDRATE 40 MG/ML
2 INJECTION, SOLUTION INTRAVENOUS ONCE
Status: COMPLETED | OUTPATIENT
Start: 2025-02-04 | End: 2025-02-04

## 2025-02-04 RX ORDER — POTASSIUM CHLORIDE 14.9 MG/ML
20 INJECTION INTRAVENOUS ONCE
Status: COMPLETED | OUTPATIENT
Start: 2025-02-04 | End: 2025-02-04

## 2025-02-04 RX ORDER — ALBUMIN (HUMAN) 12.5 G/50ML
25 SOLUTION INTRAVENOUS ONCE
Status: COMPLETED | OUTPATIENT
Start: 2025-02-04 | End: 2025-02-04

## 2025-02-04 RX ORDER — POTASSIUM CHLORIDE 1500 MG/1
40 TABLET, EXTENDED RELEASE ORAL ONCE
Status: COMPLETED | OUTPATIENT
Start: 2025-02-04 | End: 2025-02-04

## 2025-02-04 RX ORDER — MIDODRINE HYDROCHLORIDE 5 MG/1
5 TABLET ORAL
Status: DISCONTINUED | OUTPATIENT
Start: 2025-02-04 | End: 2025-02-05

## 2025-02-04 RX ORDER — SPIRONOLACTONE 25 MG/1
25 TABLET ORAL DAILY
Status: DISCONTINUED | OUTPATIENT
Start: 2025-02-04 | End: 2025-02-04

## 2025-02-04 RX ORDER — POTASSIUM CHLORIDE 14.9 MG/ML
20 INJECTION INTRAVENOUS
Status: COMPLETED | OUTPATIENT
Start: 2025-02-04 | End: 2025-02-05

## 2025-02-04 RX ORDER — FUROSEMIDE 10 MG/ML
40 INJECTION INTRAMUSCULAR; INTRAVENOUS EVERY 12 HOURS
Status: DISCONTINUED | OUTPATIENT
Start: 2025-02-04 | End: 2025-02-05

## 2025-02-04 RX ADMIN — RIFAXIMIN 550 MG: 550 TABLET ORAL at 22:18

## 2025-02-04 RX ADMIN — POTASSIUM CHLORIDE 20 MEQ: 200 INJECTION, SOLUTION INTRAVENOUS at 15:23

## 2025-02-04 RX ADMIN — RIFAXIMIN 550 MG: 550 TABLET ORAL at 08:20

## 2025-02-04 RX ADMIN — SPIRONOLACTONE 25 MG: 25 TABLET ORAL at 08:20

## 2025-02-04 RX ADMIN — URSODIOL 300 MG: 300 CAPSULE ORAL at 08:20

## 2025-02-04 RX ADMIN — OXYCODONE HYDROCHLORIDE 10 MG: 10 TABLET ORAL at 19:34

## 2025-02-04 RX ADMIN — ALBUMIN (HUMAN) 25 G: 0.25 INJECTION, SOLUTION INTRAVENOUS at 18:10

## 2025-02-04 RX ADMIN — POTASSIUM CHLORIDE 40 MEQ: 1500 TABLET, EXTENDED RELEASE ORAL at 15:23

## 2025-02-04 RX ADMIN — MAGNESIUM SULFATE HEPTAHYDRATE 2 G: 40 INJECTION, SOLUTION INTRAVENOUS at 15:23

## 2025-02-04 RX ADMIN — SERTRALINE 100 MG: 100 TABLET, FILM COATED ORAL at 08:20

## 2025-02-04 RX ADMIN — CARVEDILOL 3.12 MG: 3.12 TABLET, FILM COATED ORAL at 08:20

## 2025-02-04 RX ADMIN — LACTULOSE 30 G: 20 SOLUTION ORAL at 08:21

## 2025-02-04 RX ADMIN — HEPARIN SODIUM 5000 UNITS: 5000 INJECTION INTRAVENOUS; SUBCUTANEOUS at 15:23

## 2025-02-04 RX ADMIN — OXYCODONE HYDROCHLORIDE 10 MG: 10 TABLET ORAL at 06:50

## 2025-02-04 RX ADMIN — NYSTATIN: 100000 POWDER TOPICAL at 22:17

## 2025-02-04 RX ADMIN — FOLIC ACID 1 MG: 1 TABLET ORAL at 08:20

## 2025-02-04 RX ADMIN — DAPSONE 50 MG: 25 TABLET ORAL at 08:23

## 2025-02-04 RX ADMIN — HEPARIN SODIUM 5000 UNITS: 5000 INJECTION INTRAVENOUS; SUBCUTANEOUS at 05:12

## 2025-02-04 RX ADMIN — LACTULOSE 30 G: 20 SOLUTION ORAL at 18:09

## 2025-02-04 RX ADMIN — POTASSIUM CHLORIDE 40 MEQ: 1500 TABLET, EXTENDED RELEASE ORAL at 06:48

## 2025-02-04 RX ADMIN — POTASSIUM CHLORIDE 20 MEQ: 200 INJECTION, SOLUTION INTRAVENOUS at 23:35

## 2025-02-04 RX ADMIN — NYSTATIN: 100000 POWDER TOPICAL at 08:25

## 2025-02-04 RX ADMIN — CYCLOBENZAPRINE 10 MG: 10 TABLET, FILM COATED ORAL at 22:18

## 2025-02-04 RX ADMIN — URSODIOL 300 MG: 300 CAPSULE ORAL at 18:09

## 2025-02-04 RX ADMIN — MIDODRINE HYDROCHLORIDE 5 MG: 5 TABLET ORAL at 15:37

## 2025-02-04 RX ADMIN — HEPARIN SODIUM 5000 UNITS: 5000 INJECTION INTRAVENOUS; SUBCUTANEOUS at 22:18

## 2025-02-04 RX ADMIN — LACTULOSE 30 G: 20 SOLUTION ORAL at 11:40

## 2025-02-04 RX ADMIN — AMITRIPTYLINE HYDROCHLORIDE 75 MG: 25 TABLET, FILM COATED ORAL at 22:18

## 2025-02-04 RX ADMIN — POTASSIUM CHLORIDE 20 MEQ: 14.9 INJECTION, SOLUTION INTRAVENOUS at 06:50

## 2025-02-04 RX ADMIN — LACTULOSE 30 G: 20 SOLUTION ORAL at 22:18

## 2025-02-04 NOTE — ASSESSMENT & PLAN NOTE
History of HYATT cirrhosis.  Recent EGD without evidence of varices  Currently on rifaximin, lactulose, carvedilol, and torsemide. States she has not been taking spironolactone.   - Of note: patient did not sound confident in endorsing adherence to her diuretic regimen.  Will continue above regimen.

## 2025-02-04 NOTE — PLAN OF CARE
Problem: PAIN - ADULT  Goal: Verbalizes/displays adequate comfort level or baseline comfort level  Description: Interventions:  - Encourage patient to monitor pain and request assistance  - Assess pain using appropriate pain scale  - Administer analgesics based on type and severity of pain and evaluate response  - Implement non-pharmacological measures as appropriate and evaluate response  - Consider cultural and social influences on pain and pain management  - Notify physician/advanced practitioner if interventions unsuccessful or patient reports new pain  Outcome: Progressing     Problem: INFECTION - ADULT  Goal: Absence or prevention of progression during hospitalization  Description: INTERVENTIONS:  - Assess and monitor for signs and symptoms of infection  - Monitor lab/diagnostic results  - Monitor all insertion sites, i.e. indwelling lines, tubes, and drains  - Monitor endotracheal if appropriate and nasal secretions for changes in amount and color  - [place] appropriate cooling/warming therapies per order  - Administer medications as ordered  - Instruct and encourage patient and family to use good hand hygiene technique  - Identify and instruct in appropriate isolation precautions for identified infection/condition  Outcome: Progressing  Goal: Absence of fever/infection during neutropenic period  Description: INTERVENTIONS:  - Monitor WBC    Outcome: Progressing     Problem: SAFETY ADULT  Goal: Patient will remain free of falls  Description: INTERVENTIONS:  - Educate patient/family on patient safety including physical limitations  - Instruct patient to call for assistance with activity   - Consult OT/PT to assist with strengthening/mobility   - Keep Call bell within reach  - Keep bed low and locked with side rails adjusted as appropriate  - Keep care items and personal belongings within reach  - Initiate and maintain comfort rounds  - Make Fall Risk Sign visible to staff  - Offer Toileting every 2 Hours,  in advance of need  - Initiate/Maintain alarm  - Obtain necessary fall risk management equipment: walker  - Apply yellow socks and bracelet for high fall risk patients  - Consider moving patient to room near nurses station  Outcome: Progressing  Goal: Maintain or return to baseline ADL function  Description: INTERVENTIONS:  -  Assess patient's ability to carry out ADLs; assess patient's baseline for ADL function and identify physical deficits which impact ability to perform ADLs (bathing, care of mouth/teeth, toileting, grooming, dressing, etc.)  - Assess/evaluate cause of self-care deficits   - Assess range of motion  - Assess patient's mobility; develop plan if impaired  - Assess patient's need for assistive devices and provide as appropriate  - Encourage maximum independence but intervene and supervise when necessary  - Involve family in performance of ADLs  - Assess for home care needs following discharge   - Consider OT consult to assist with ADL evaluation and planning for discharge  - Provide patient education as appropriate  Outcome: Progressing  Goal: Maintains/Returns to pre admission functional level  Description: INTERVENTIONS:  - Perform AM-PAC 6 Click Basic Mobility/ Daily Activity assessment daily.  - Set and communicate daily mobility goal to care team and patient/family/caregiver.   - Collaborate with rehabilitation services on mobility goals if consulted  - Perform Range of Motion 3 times a day.  - Reposition patient every 2 hours.  - Dangle patient 3 times a day  - Stand patient 3 times a day  - Ambulate patient 3 times a day  - Out of bed to chair 3 times a day   - Out of bed for meals 3 times a day  - Out of bed for toileting  - Record patient progress and toleration of activity level   Outcome: Progressing     Problem: DISCHARGE PLANNING  Goal: Discharge to home or other facility with appropriate resources  Description: INTERVENTIONS:  - Identify barriers to discharge w/patient and caregiver  -  Arrange for needed discharge resources and transportation as appropriate  - Identify discharge learning needs (meds, wound care, etc.)  - Arrange for interpretive services to assist at discharge as needed  - Refer to Case Management Department for coordinating discharge planning if the patient needs post-hospital services based on physician/advanced practitioner order or complex needs related to functional status, cognitive ability, or social support system  Outcome: Progressing     Problem: Knowledge Deficit  Goal: Patient/family/caregiver demonstrates understanding of disease process, treatment plan, medications, and discharge instructions  Description: Complete learning assessment and assess knowledge base.  Interventions:  - Provide teaching at level of understanding  - Provide teaching via preferred learning methods  Outcome: Progressing

## 2025-02-04 NOTE — ASSESSMENT & PLAN NOTE
Patient presented on 2/3/2025 reporting diffuse edema and a 30 pound weight gain over the past 2 weeks.  She was recently hospitalized 2 weeks ago for rectal bleeding which required significant blood transfusions.  Significant history of [place] cirrhosis.  Denies chest pain or shortness of breath.  Will restart her spironolactone given the patient's hypokalemia and begin IV loop diuretics once potassium levels improve.  GI consulted.

## 2025-02-04 NOTE — ASSESSMENT & PLAN NOTE
Hemoglobin 8.1 on arrival.now 7.2 will transfuse if less then 7.  Unclear what her baseline is after recent rectal bleed.  Previous appeared to be between 9 and 10.  No current evidence of blood loss.  Continue to monitor.

## 2025-02-04 NOTE — ASSESSMENT & PLAN NOTE
Body mass index is 49.46 kg/m².  Recommend incorporating a more whole foods plant-predominant diet along with decreasing consumption of red meats and processed foods  Per AHA guidelines, recommend moderate-vigorous intensity exercise for 30 minutes a day for 5 days a week or a total of 150 min/week

## 2025-02-04 NOTE — PLAN OF CARE
Problem: INFECTION - ADULT  Goal: Absence or prevention of progression during hospitalization  Description: INTERVENTIONS:  - Assess and monitor for signs and symptoms of infection  - Monitor lab/diagnostic results  - Monitor all insertion sites, i.e. indwelling lines, tubes, and drains  - Monitor endotracheal if appropriate and nasal secretions for changes in amount and color  - [place] appropriate cooling/warming therapies per order  - Administer medications as ordered  - Instruct and encourage patient and family to use good hand hygiene technique  - Identify and instruct in appropriate isolation precautions for identified infection/condition  Outcome: Progressing     Problem: SAFETY ADULT  Goal: Patient will remain free of falls  Description: INTERVENTIONS:  - Educate patient/family on patient safety including physical limitations  - Instruct patient to call for assistance with activity   - Consult OT/PT to assist with strengthening/mobility   - Keep Call bell within reach  - Keep bed low and locked with side rails adjusted as appropriate  - Keep care items and personal belongings within reach  - Initiate and maintain comfort rounds  - Make Fall Risk Sign visible to staff    - Apply yellow socks and bracelet for high fall risk patients  - Consider moving patient to room near nurses station  Outcome: Progressing

## 2025-02-04 NOTE — UTILIZATION REVIEW
Initial Clinical Review    Admission: Date/Time/Statement:   Admission Orders (From admission, onward)       Ordered        02/03/25 1943  INPATIENT ADMISSION  Once                          Orders Placed This Encounter   Procedures    INPATIENT ADMISSION     Standing Status:   Standing     Number of Occurrences:   1     Level of Care:   Med Surg [16]     Estimated length of stay:   More than 2 Midnights     Certification:   I certify that inpatient services are medically necessary for this patient for a duration of greater than two midnights. See H&P and MD Progress Notes for additional information about the patient's course of treatment.     ED Arrival Information       Expected   -    Arrival   2/3/2025 15:34    Acuity   Urgent              Means of arrival   Wheelchair    Escorted by   Family Member    Service   Hospitalist    Admission type   Emergency              Arrival complaint   Sent by PCP-retaining fluids             Chief Complaint   Patient presents with    Edema     Patient reports 30 lb weight gain in two weeks. Sent to ED by cardiologist. Reports recent hospitalization for rectal bleeding.        Initial Presentation: 54 y.o. female to ED via WC from home  Present to ED with diffuse edema and 30 pound weight gain over the past 2 weeks.  Patient was seeing her cardiologist earlier today who advised the patient to proceed to the emergency department for evaluation. She states she was hospitalized 2 weeks ago for GI bleed which required emergent colonoscopy and significant blood transfusions.   PMHX: Hyatt cirrhosis, morbid obesity, L2 fracture with back pain, sarcoidosis of skin, carcinoid tumor in 2014, nonalcoholic fatty liver disease, anxiety and depression, neuropathy   Admitted to [facility] with DX: Liver cirrhosis secondary to HYATT (nonalcoholic steatohepatitis)   on exam: abdominal distension; pain 6/10; B/L LE edema from distal foot up to bilateral thighs; heme 8.1; K 2.6; albumin 2.3; Ca 7.3; T bili  1.36  CXR pulmonary vascular prominence.   PLAN: cont lactulose po; fluid / Na restriction; monitor labs; pain control (see below); GI consulted      Anticipated Length of Stay/Certification Statement: Patient will be admitted on an inpatient basis with an anticipated length of stay of greater than 2 midnights secondary to anasarca.       Date: 2/4/25      Day 2   Patient still feels swollen. Anasarca resolved. Heme 7.2; lungs with rales; abdominal distension; pain 7/10; B/L LE edema; WBC 2.30; K 3.0  Plan: start lasix iv; recd KCL iv x1; cont lactulose po; fluid / Na restriction; monitor labs; pain control (see below); GI consulted; f/u U/S abdomen        ED Treatment-Medication Administration from 02/03/2025 1531 to 02/03/2025 2004         Date/Time Order Dose Route Action     02/03/2025 1927 oxyCODONE (ROXICODONE) immediate release tablet 10 mg 10 mg Oral Given     02/03/2025 1929 potassium chloride 20 mEq IVPB (premix) 20 mEq Intravenous New Bag     02/03/2025 1928 potassium chloride (Klor-Con M20) CR tablet 40 mEq 40 mEq Oral Given            Scheduled Medications:  amitriptyline, 75 mg, Oral, HS  carvedilol, 3.125 mg, Oral, BID With Meals  dapsone, 50 mg, Oral, Daily  folic acid, 1 mg, Oral, Daily  furosemide, 40 mg, Intravenous, Q12H (started 2/4)   heparin (porcine), 5,000 Units, Subcutaneous, Q8H MIGUEL  lactulose, 30 g, Oral, 4x Daily  lidocaine, 1 patch, Topical, Daily  nystatin, , Topical, BID  potassium chloride, 40 mEq, Oral, Once  rifaximin, 550 mg, Oral, Q12H MIGUEL  sertraline, 100 mg, Oral, Daily  spironolactone, 25 mg, Oral, Daily  ursodiol, 300 mg, Oral, BID    potassium chloride 20 mEq IVPB (premix)  Dose: 20 mEq  Freq: Once Route: IV  Last Dose: Stopped (02/04/25 1007)  Start: 02/04/25 0600 End: 02/04/25 1007      Continuous IV Infusions: None       PRN Meds:  acetaminophen, 488 mg, Oral, Q4H PRN  cyclobenzaprine, 10 mg, Oral, TID PRN  oxyCODONE, 10 mg, Oral, Q6H PRN  (2/4 recd x1 so far today)    polyethylene glycol, 17 g, Oral, Daily PRN  trimethobenzamide, 200 mg, Intramuscular, Q6H PRN      ED Triage Vitals [02/03/25 1550]   Temperature Pulse Respirations Blood Pressure SpO2 Pain Score   98 °F (36.7 °C) 85 16 125/79 96 % 6     Weight (last 2 days)       Date/Time Weight    02/04/25 0547 135 (297.2)    02/04/25 0501 135 (297.2)    02/03/25 2141 135 (297.2)    02/03/25 2010 135 (297.2)    02/03/25 1550 137 (302)            Vital Signs (last 3 days)       Date/Time Temp Pulse Resp BP MAP (mmHg) SpO2 O2 Device Patient Position - Orthostatic VS [place] Coma Scale Score Pain    02/04/25 0825 -- -- -- -- -- -- -- -- 15 No Pain    02/04/25 07:57:27 98.2 °F (36.8 °C) 84 18 114/59 77 98 % -- -- -- --    02/04/25 0650 -- -- -- -- -- -- -- -- -- 7 02/03/25 22:56:16 -- 89 -- 119/51 74 98 % -- -- -- --    02/03/25 20:11:57 98.6 °F (37 °C) 91 20 167/84 112 99 % -- Lying -- --    02/03/25 2011 -- -- -- -- -- -- -- -- 15 3    02/03/25 20:10:19 -- -- -- 167/84 112 -- -- -- -- --    02/03/25 1927 -- -- -- -- -- -- -- -- -- 7    02/03/25 1845 -- -- -- 110/54 78 -- -- -- -- --    02/03/25 1830 -- 89 -- 114/54 78 97 % -- -- -- --    02/03/25 1815 -- 86 -- 118/55 80 99 % -- -- -- --    02/03/25 1800 -- 88 -- 130/67 89 100 % -- -- -- --    02/03/25 1745 -- 90 -- -- -- 92 % -- -- -- --    02/03/25 1740 -- -- -- -- -- -- -- -- 15 --    02/03/25 1730 -- 88 -- 131/59 87 100 % -- -- -- --    02/03/25 1715 -- 94 -- 151/70 100 92 % -- -- -- --    02/03/25 1700 -- 86 -- 142/66 95 98 % -- -- -- --    02/03/25 1645 -- 92 -- -- -- 95 % -- -- -- --    02/03/25 1630 -- 86 -- 130/64 87 99 % -- -- -- --    02/03/25 1550 98 °F (36.7 °C) 85 16 125/79 -- 96 % None (Room air) Sitting -- 6              Pertinent Labs/Diagnostic Test Results:   Radiology:  XR chest 1 view portable   Final Interpretation by Sreekanth Alexander MD (02/03 1916)      Mild central pulmonary vascular prominence. No suspicious infiltrates are seen.             Workstation performed: [id number]          abdomen limited    (Results Pending)     Cardiology:  ECG 12 lead   Final Result by Gaston Estes MD (02/04 0831)   Normal sinus rhythm with sinus arrhythmia   Nonspecific ST and T wave abnormality   Abnormal ECG   When compared with ECG of 26-Dec-2024 08:12,   No significant change was found   Confirmed by Gaston Estes ([id number]) on 2/4/2025 8:31:40 AM           Results from last 7 days   Lab Units 02/04/25 0456 02/03/25  1734   WBC Thousand/uL 2.30* 4.86   HEMOGLOBIN g/dL 7.2* 8.1*   HEMATOCRIT % 23.0* 26.5*   PLATELETS Thousands/uL 75* 104*   TOTAL NEUT ABS Thousands/µL  --  3.06        Results from last 7 days   Lab Units 02/04/25  1334 02/04/25 0456 02/03/25 2207 02/03/25  1734   SODIUM mmol/L 138 140 139 136   POTASSIUM mmol/L 3.0* 2.7* 2.4* 2.6*   CHLORIDE mmol/L 97 97 97 95*   CO2 mmol/L 38* 38* 39* 37*   ANION GAP mmol/L 3* 5 3* 4   BUN mg/dL 11 11 12 12   CREATININE mg/dL 1.19 1.06 1.19 1.16   EGFR ml/min/1.73sq m 51 59 51 53   CALCIUM mg/dL 7.3* 7.0* 6.8* 7.3*   MAGNESIUM mg/dL 1.4*  --   --   --      Results from last 7 days   Lab Units 02/03/25  1734   AST U/L 46*   ALT U/L 34   ALK PHOS U/L 245*   TOTAL PROTEIN g/dL 5.7*   ALBUMIN g/dL 2.3*   TOTAL BILIRUBIN mg/dL 1.36*   AMMONIA umol/L 77*        Results from last 7 days   Lab Units 02/04/25  1334 02/04/25 0456 02/03/25 2207 02/03/25  1734   GLUCOSE RANDOM mg/dL 222* 105 104 96        Results from last 7 days   Lab Units 02/03/25 2207 02/03/25  1936 02/03/25  1734   HS TNI 0HR ng/L  --   --  10   HS TNI 2HR ng/L  --  11  --    HSTNI D2 ng/L  --  1  --    HS TNI 4HR ng/L 11  --   --    HSTNI D4 ng/L 1  --   --         Results from last 7 days   Lab Units 02/03/25  1734   BNP pg/mL 82           Past Medical History:   Diagnosis Date    Back pain     Carcinoid tumor     neuroendocrine    CHF (congestive heart failure) (HCC)     Cirrhosis of liver (HCC)     Sarcoidosis      Present on Admission:    Chronic, continuous use of opioids   Class 3 severe obesity due to excess calories in adult (HCC)   Hypokalemia   Primary hypertension   Fibromyalgia   Iron deficiency anemia   Liver cirrhosis secondary to HYATT (nonalcoholic steatohepatitis) (HCC)      Admitting Diagnosis: Edema [R60.9]  Hypokalemia [E87.6]  Anasarca [R60.1]  Age/Sex: 54 y.o. female    Network Utilization Review Department  ATTENTION: Please call with any questions or concerns to [phone] and carefully listen to the prompts so that you are directed to the right person. All voicemails are confidential.   For Discharge needs, contact Care Management DC Support Team at [phone] opt. [phone]  Send all requests for admission clinical reviews, approved or denied determinations and any other requests to dedicated fax number below belonging to the campus where the patient is receiving treatment. List of dedicated fax numbers for the Facilities:  FACILITY NAME UR FAX NUMBER   ADMISSION DENIALS (Administrative/Medical Necessity) [phone]   DISCHARGE SUPPORT TEAM (NETWORK) [phone]   PARENT CHILD HEALTH (Maternity/NICU/Pediatrics) [phone]   [facility] [phone]   [facility] [phone]   [facility] [phone]   [facility] [phone]   [facility] [phone]   [facility] [phone]   [facility] [phone]   [facility] [phone]   [facility] [phone]   [facility] [phone]   [facility] [phone]   [facility] [phone]

## 2025-02-04 NOTE — ASSESSMENT & PLAN NOTE
Hemoglobin 8.1 on arrival.  Unclear what her baseline is after recent rectal bleed.  Previous appeared to be between 9 and 10.  No current evidence of blood loss.  Continue to monitor.

## 2025-02-04 NOTE — ASSESSMENT & PLAN NOTE
Potassium 2.6 on arrival.  Repleted with 60 mEq in the ED.  Currently awaiting repeat BMP.  Will replete as needed.

## 2025-02-04 NOTE — NURSING NOTE
"Patient stated during this mornings blood draw \"I am peeing myself\". Patient was offered assistance to get cleaned up and refused. She stated \"No, I am too tired and need to rest\". Patient was then asked if she is incontinent at home where she is independent and she replied \"No, I use the bathroom. I usually just have the urgency to go when I think about it\". I then placed a bedside commode at the bedside for when she has the urgency so she is not soiling her undergarments or the linens, yet she refuses to ambulate to toilet or be cleaned up. Morning labs were collected approximately at 04:55AM and the patient is only complying to being cleaned up and now is a full bed strip at 06:40AM. I educated the patient on the importance of ambulation, toileting, and the risk of infection/skin break down.   "

## 2025-02-04 NOTE — H&P
H&P - Hospitalist   Name: Bailey Olsen 54 y.o. female I MRN: [id number]  Unit/Bed#: -01 I Date of Admission: 2/3/2025   Date of Service: 2/4/2025 I Hospital Day: 1     Assessment & Plan  [place]  Patient presented on 2/3/2025 reporting diffuse edema and a 30 pound weight gain over the past 2 weeks.  She was recently hospitalized 2 weeks ago for rectal bleeding which required significant blood transfusions.  Significant history of Hyatt cirrhosis.  Denies chest pain or shortness of breath.  Will restart her spironolactone given the patient's hypokalemia and begin IV loop diuretics once potassium levels improve.  GI consulted.  Cirrhosis of liver (HCC)  History of HYATT cirrhosis.  Recent EGD without evidence of varices  Currently on rifaximin, lactulose, carvedilol, and torsemide. States she has not been taking spironolactone.   - Of note: patient did not sound confident in endorsing adherence to her diuretic regimen.  Will continue above regimen.  Hypokalemia  Potassium 2.6 on arrival.  Repleted with 60 mEq in the ED.  Currently awaiting repeat BMP.  Will replete as needed.  Iron deficiency anemia  Hemoglobin 8.1 on arrival.  Unclear what her baseline is after recent rectal bleed.  Previous appeared to be between 9 and 10.  No current evidence of blood loss.  Continue to monitor.  Fibromyalgia  History of chronic, diffuse pain. Will continue home regimen.  Chronic, continuous use of opioids  Continue home pain regimen.  Class 3 severe obesity due to excess calories in adult ([facility])  Body mass index is 49.46 kg/m².  Recommend incorporating a more whole foods plant-predominant diet along with decreasing consumption of red meats and processed foods  Per AHA guidelines, recommend moderate-vigorous intensity exercise for 30 minutes a day for 5 days a week or a total of 150 min/week   Primary hypertension  Appears stable.  Monitor.      VTE Pharmacologic Prophylaxis: VTE Score: 4 Moderate Risk (Score 3-4) -  Pharmacological DVT Prophylaxis Ordered: heparin.  Code Status: Level 1 - Full Code   Discussion with family: Patient declined call to .     Anticipated Length of Stay: Patient will be admitted on an inpatient basis with an anticipated length of stay of greater than 2 midnights secondary to anasarca.    History of Present Illness   Chief Complaint: edema/weight gain    Bailey Olsen is a 54 y.o. female with a PMH of morbid obesity, L2 fracture with back pain, sarcoidosis of skin, carcinoid tumor in 2014, nonalcoholic fatty liver disease, anxiety and depression, neuropathy  who presents with diffuse edema and 30 pound weight gain over the past 2 weeks.  Patient was seeing her cardiologist earlier today who advised the patient to proceed to the emergency department for evaluation.  She states she was hospitalized 2 weeks ago for GI bleed which required emergent colonoscopy and significant blood transfusions.  She denies chest pain, shortness of breath, fever, chills, abdominal pain, nausea, vomiting, diarrhea, lightheadedness, dizziness, palpitations.    Review of Systems   Constitutional:  Negative for chills and fever.   HENT:  Negative for ear pain and sore throat.    Eyes:  Negative for pain and visual disturbance.   Respiratory:  Negative for cough and shortness of breath.    Cardiovascular:  Positive for leg swelling. Negative for chest pain and palpitations.   Gastrointestinal:  Positive for abdominal distention. Negative for abdominal pain and vomiting.   Genitourinary:  Negative for dysuria and hematuria.   Musculoskeletal:  Negative for arthralgias and back pain.   Skin:  Negative for color change and rash.   Neurological:  Negative for seizures and syncope.   All other systems reviewed and are negative.      Historical Information   Past Medical History:   Diagnosis Date    Back pain     Carcinoid tumor     neuroendocrine    CHF (congestive heart failure) (HCC)     Cirrhosis of liver (HCC)      Sarcoidosis      Past Surgical History:   Procedure Laterality Date    CHOLECYSTECTOMY      GASTRIC BYPASS      HERNIA REPAIR      IR KYPHOPLASTY/VERTEBROPLASTY  10/15/2024     Social History     Tobacco Use    Smoking status: Never    Smokeless tobacco: Never   Vaping Use    Vaping status: Never Used   Substance and Sexual Activity    Alcohol use: Never    Drug use: Never    Sexual activity: Not Currently     E-Cigarette/Vaping    E-Cigarette Use Never User      E-Cigarette/Vaping Substances     Family History   Problem Relation Age of Onset    Hypertension Father      Social History:  Marital Status: /Civil Union   Occupation:   Patient Pre-hospital Living Situation: Home  Patient Pre-hospital Level of Mobility: walks with walker  Patient Pre-hospital Diet Restrictions:     Meds/Allergies   I have reviewed home medications with patient personally.  Prior to Admission medications    Medication Sig Start Date End Date Taking? Authorizing Provider   amitriptyline (ELAVIL) 75 mg tablet Take 75 mg by mouth daily at bedtime   Yes Historical Provider, MD   aspirin-acetaminophen-caffeine (EXCEDRIN MIGRAINE) 250-250-65 MG per tablet Take 1 tablet by mouth every 6 (six) hours as needed for headaches   Yes Historical Provider, MD   carvedilol (COREG) 3.125 mg tablet Take 1 tablet (3.125 mg total) by mouth 2 (two) times a day with meals 1/23/25  Yes Piyush Arora MD   cyclobenzaprine (FLEXERIL) 10 mg tablet Take 10 mg by mouth 3 (three) times a day as needed for muscle spasms   Yes Historical Provider, MD   dapsone 25 mg tablet Take 50 mg by mouth daily   Yes Historical Provider, MD   folic acid (FOLVITE) 1 mg tablet Take 1 mg by mouth daily 8/4/16 2/28/25 Yes Historical Provider, MD   lactulose (CHRONULAC) 10 g/15 mL solution Take 45 mL (30 g total) by mouth 3 (three) times a day  Patient taking differently: Take 30 g by mouth 4 (four) times a day 12/28/24  Yes Silva Giron PA-C   lidocaine (LIDODERM) 5 % Apply  1 patch topically over 12 hours daily Remove & Discard patch within 12 hours or as directed by MD 8/4/24  Yes Ruslan Keyes MD   octreotide (SandoSTATIN LAR DEPOT) 30 mg IM injection kit Inject 30 mg into a muscle every 28 days   Yes Historical Provider, MD   oxyCODONE (ROXICODONE) 10 MG TABS Take 10 mg by mouth every 6 (six) hours as needed for moderate pain   Yes Historical Provider, MD   polyethylene glycol (MIRALAX) 17 g packet Take 17 g by mouth daily as needed   Yes Historical Provider, MD   rifaximin (XIFAXAN) 550 mg tablet Take 550 mg by mouth every 12 (twelve) hours   Yes Historical Provider, MD   sertraline (ZOLOFT) 100 mg tablet Take 1 tablet (100 mg total) by mouth daily 7/8/24  Yes Mar Oliva MD   spironolactone (ALDACTONE) 50 mg tablet Take 1 tablet (50 mg total) by mouth daily  Patient taking differently: Take 25 mg by mouth daily 11/26/24 2/3/25 Yes Antonio Anaya DO   torsemide (DEMADEX) 20 mg tablet Take 1 tablet (20 mg total) by mouth daily  Patient taking differently: Take 20 mg by mouth 2 (two) times a day 11/26/24 2/3/25 Yes Antonio Anaya DO   ursodiol (ACTIGALL) 300 mg capsule Take 300 mg by mouth 2 (two) times a day   Yes Historical Provider, MD   gabapentin (NEURONTIN) 100 mg capsule Take 2 capsules (200 mg total) by mouth 3 (three) times a day  Patient not taking: Reported on 2/3/2025 8/3/24 12/26/24  Ruslan Keyes MD   naloxone (NARCAN) 4 mg/0.1 mL nasal spray 1 spray by Alternating Nares route every 3 (three) minutes as needed for opioid reversal or respiratory depression Administer 1 spray into a nostril. If no response after 2-3 minutes, give another dose in the other nostril using a new spray.    Historical Provider, MD   nystatin (MYCOSTATIN) powder Apply topically 2 (two) times a day 10/6/24 1/17/25  Idalia Stuart MD   oxyCODONE-acetaminophen (PERCOCET)  mg per tablet Take 1 tablet by mouth every 6 (six) hours as needed for moderate pain  Patient not  taking: Reported on 2/3/2025    Historical Provider, MD   pantoprazole (PROTONIX) 40 mg tablet Take 1 tablet (40 mg total) by mouth daily in the early morning  Patient not taking: Reported on 2/3/2025 11/27/24   Antonio Anaya,      Allergies   Allergen Reactions    Zolpidem GI Intolerance, Other (See Comments) and Hives     Other Reaction(s): Other      Blacks out    Blacks out    Tapentadol Sneezing and Other (See Comments)     Zoned out    Alprazolam Other (See Comments)     Other Reaction(s): Other      Feels zoned out    Feels zoned out    Amoxicillin-Pot Clavulanate Hives and Nausea Only     Amoxil fine    Dust Mite Extract Sneezing    Morphine Headache, Nausea Only and Vomiting    Tramadol GI Intolerance    Azelastine Rash    Azelastine Hcl Rash    Molds & Smuts Rash     Other Reaction(s): Unknown       Objective :  Temp:  [98 °F (36.7 °C)-98.6 °F (37 °C)] 98.6 °F (37 °C)  HR:  [85-94] 89  BP: (110-167)/(51-84) 119/51  Resp:  [16] 16  SpO2:  [92 %-100 %] 98 %  O2 Device: None (Room air)    Physical Exam  Vitals and nursing note reviewed.   Constitutional:       General: She is not in acute distress.     Appearance: She is well-developed. She is obese.   HENT:      Head: Normocephalic and atraumatic.   Eyes:      Conjunctiva/sclera: Conjunctivae normal.   Cardiovascular:      Rate and Rhythm: Normal rate and regular rhythm.      Heart sounds: No murmur heard.  Pulmonary:      Effort: Pulmonary effort is normal. No respiratory distress.      Breath sounds: Normal breath sounds.   Abdominal:      General: There is distension.      Palpations: Abdomen is soft.      Tenderness: There is no abdominal tenderness.   Musculoskeletal:         General: Swelling present.      Cervical back: Neck supple.      Right lower leg: Edema present.      Left lower leg: Edema present.   Skin:     General: Skin is warm and dry.   Neurological:      General: No focal deficit present.      Mental Status: She is alert and  oriented to person, place, and time.   Psychiatric:         Mood and Affect: Mood normal.          Lines/Drains:            Lab Results: I have reviewed the following results:  Results from last 7 days   Lab Units 02/03/25  1734   WBC Thousand/uL 4.86   HEMOGLOBIN g/dL 8.1*   HEMATOCRIT % 26.5*   PLATELETS Thousands/uL 104*   SEGS PCT % 62   LYMPHO PCT % 26   MONO PCT % 7   EOS PCT % 3     Results from last 7 days   Lab Units 02/03/25  1734   SODIUM mmol/L 136   POTASSIUM mmol/L 2.6*   CHLORIDE mmol/L 95*   CO2 mmol/L 37*   BUN mg/dL 12   CREATININE mg/dL 1.16   ANION GAP mmol/L 4   CALCIUM mg/dL 7.3*   ALBUMIN g/dL 2.3*   TOTAL BILIRUBIN mg/dL 1.36*   ALK PHOS U/L 245*   ALT U/L 34   AST U/L 46*   GLUCOSE RANDOM mg/dL 96             Lab Results   Component Value Date    HGBA1C 5.9 (H) 02/27/2024    HGBA1C 5.9 08/03/2023           Imaging Results Review: I reviewed radiology reports from this admission including: chest xray.      Administrative Statements   I have spent a total time of 75 minutes in caring for this patient on the day of the visit/encounter including Diagnostic results, Patient and family education, Documenting in the medical record, Reviewing / ordering tests, medicine, procedures  , and Obtaining or reviewing history  .    ** Please Note: This note has been constructed using a voice recognition system. **

## 2025-02-04 NOTE — ASSESSMENT & PLAN NOTE
History of HYATT cirrhosis.  Recent EGD without evidence of varices  Currently on rifaximin, lactulose, carvedilol, and torsemide. States she has not been taking spironolactone.   - Of note: patient did not sound confident in endorsing adherence to her diuretic regimen.  Decompensated with anasarca  Now that k improved to 3 will give additional potassium and start lasix 40 mg iv bid with monitoring electrolytes - will give another dose of kcl 40 meq po x1 and iv 20 meq x1  Am labs  Daily weight   I and   Ammonia 77 no encephalopathy so will not trend   With lactulose aim at 3 bm a day  Us of abdomen to assess for ascites   She states she gained 30 lb and is usually about 250-260 lb

## 2025-02-04 NOTE — CASE MANAGEMENT
Case Management Assessment & Discharge Planning Note    Patient name Bailey Olsen  Location /-01 MRN [id number]  : 1970 Date 2025       Current Admission Date: 2/3/2025  Current Admission Diagnosis:Anasarca   Patient Active Problem List    Diagnosis Date Noted Date Diagnosed    Anasarca 2025     Upper GI bleed 2025     Acute anemia 2025     Rectal bleeding 2025     Acute blood loss anemia 2025     Chronic, continuous use of opioids 2025     Acute metabolic encephalopathy 2025     SIRS (systemic inflammatory response syndrome) ([facility]) 2025     Influenza B 2024     Sacral fracture, closed ([facility]) 11/15/2024     KISHORE (acute kidney injury) ([facility]) 10/04/2024     Morbid obesity with BMI of 40.0-44.9, adult ([facility]) 10/04/2024     Pathological fracture of vertebra due to secondary osteoporosis ([facility]) 2024     Fall from standing 2024     Closed compression fracture of L1 lumbar vertebra, sequela 2024     Closed stable burst fracture of first lumbar vertebra with routine healing 2024     Groin hematoma 2024     Ambulatory dysfunction 2024     Intractable back pain 2024     Hyperammonemia (HCC) 2024     Hepatic encephalopathy (HCC) 2024     Fall 2024     Cirrhosis of liver (HCC) 2024     History of malignant carcinoid tumor of small intestine 2024     Class 3 severe obesity due to excess calories in adult (HCC) 2024     Primary hypertension 2024     Hypokalemia 2024     Fibromyalgia 2024     Iron deficiency anemia 2024     Pancytopenia (HCC) 2024     Sarcoidosis 2024     Bilateral pulmonary infiltrates 2024       LOS (days): 1  Geometric Mean LOS (GMLOS) (days):   Days to GMLOS:     OBJECTIVE:  PATIENT READMITTED TO HOSPITAL  Risk of Unplanned Readmission Score: 64.35         Current admission status: Inpatient  Referral Reason: Other  (SDOH)    Preferred Pharmacy:   [id number] - [place] - [address]  [address] [address]  Phone: [phone] Fax: [phone]    [id number] - [place], [place] - [address]  [address] [address]  Phone: [phone] Fax: [phone]    Primary Care Provider: Nayely Brown DO    Primary Insurance: BLUE CROSS  Secondary Insurance: MEDICARE    ASSESSMENT:  Active Health Care Proxies       Cristian Olsen Health Care Representative - Spouse   Primary Phone: [phone] (Mobile)                 Advance Directives  Does patient have a Health Care POA?: No  Was patient offered paperwork?: Yes (declined)  Does patient have Advance Directives?: No  Was patient offered paperwork?: Yes (declined)  Primary Contact: Cristian Raúl- spouse         Readmission Root Cause  30 Day Readmission: Yes  During your hospital stay, did someone (provider, nurse, ) explain your care to you in a way you could understand?: Yes  Did you feel medically stable to leave the hospital?: Yes  Were you able to pay for your medication at the pharmacy?: Yes  Did you have reliable transportation to take you to your appointments?: No  During previous admission, was a post-acute recommendation made?: Yes  What post-acute resources were offered?: Offered, but declined  Patient was readmitted due to: edema    Patient Information  Admitted from:: Home  Mental Status: Alert  During Assessment patient was accompanied by: Not accompanied during assessment  Assessment information provided by:: Patient  Primary Caregiver: Spouse  Caregiver's Name:: Cristian Olsen- spouse  Caregiver's Relationship to Patient:: Significant Other  Caregiver's Telephone Number:: [phone]  Support Systems: Spouse/significant other, Family members  [place] [facility]  [place] [facility] do you live in?: [place]  Home entry access options. Select all that apply.:  Stairs  Number of steps to enter home.: 1  Do the steps have railings?: Yes  Type of Current Residence: 2 story home  Upon entering residence, is there a bedroom on the main floor (no further steps)?: Yes  Upon entering residence, is there a bathroom on the main floor (no further steps)?: Yes  Living Arrangements: Lives w/ Spouse/significant other  Is patient a ?: No    Activities of Daily Living Prior to Admission  Functional Status: Assistance  Completes ADLs independently?: No  Level of ADL dependence: Assistance  Ambulates independently?: No  Level of ambulatory dependence: Assistance  Does patient use assisted devices?: Yes  Assisted Devices (DME) used: Walker, Bedside Commode  Does patient currently own DME?: Yes  What DME does the patient currently own?: Walker, Straight Cane, Bedside Commode  Does patient have a history of Outpatient Therapy (PT/OT)?: No  Does the patient have a history of Short-Term Rehab?: Yes ([facility])  Does patient have a history of HHC?: Yes ([facility])  Does patient currently have HHC?: No         Patient Information Continued  Income Source: Unemployed  Does patient have prescription coverage?: Yes  Does patient receive dialysis treatments?: No  Does patient have a history of substance abuse?: No  Does patient have a history of Mental Health Diagnosis?: Yes (Anxiety and Depression)  Is patient receiving treatment for mental health?: Yes (Med managed)  Has patient received inpatient treatment related to mental health in the last 2 years?: No         Means of Transportation  Means of Transport to [facility]:: Family transport      Social Determinants of Health (SDOH)      Flowsheet Row Most Recent Value   Housing Stability    In the last 12 months, was there a time when you were not able to pay the mortgage or rent on time? N   In the past 12 months, how many times have you moved where you were living? 0   At any time in the past 12 months, were you  homeless or living in a shelter (including now)? N   Transportation Needs    In the past 12 months, has lack of transportation kept you from medical appointments or from getting medications? yes   In the past 12 months, has lack of transportation kept you from meetings, work, or from getting things needed for daily living? No   Food Insecurity    Within the past 12 months, you worried that your food would run out before you got the money to buy more. Never true   Within the past 12 months, the food you bought just didn't last and you didn't have money to get more. Never true   Utilities    In the past 12 months has the electric, gas, oil, or water company threatened to shut off services in your home? No            DISCHARGE DETAILS:    Discharge planning discussed with:: patient       CM met with pt to review role of CM and discuss any supports needed upon discharge. Baseline information obtained, pt indicates she lives at home with her spouse in a 2 story home with 1 F[address]RIC[address]. Pt indicates her bedroom and bathroom are on the main floor. Pt indicates she transfers and ambulates with a walker independently. Pt indicates her spouse assist with meals, and provides transportation at times.  Pt does indicate transportation to appointments is an issue at times. Discussed [facility] bus, pt is agreeable. STS contact information provided to pt. Pt verbalized understanding.     CM contacted family/caregiver?: Yes             Contacts  Patient Contacts: Cristian Olsen- spouse  Relationship to Patient:: Family  Contact Method: Phone  Phone Number: [phone]  Reason/Outcome: Discharge Planning

## 2025-02-04 NOTE — PROGRESS NOTES
Progress Note - Hospitalist   Name: Bailey Olsen 54 y.o. female I MRN: [id number]  Unit/Bed#: -01 I Date of Admission: 2/3/2025   Date of Service: 2/4/2025 I Hospital Day: 1    Assessment & Plan  Anasarca (Resolved: 2/4/2025)  Patient presented on 2/3/2025 reporting diffuse edema and a 30 pound weight gain over the past 2 weeks.  She was recently hospitalized 2 weeks ago for rectal bleeding which required significant blood transfusions.  Significant history of Hyatt cirrhosis.  Denies chest pain or shortness of breath.  Will restart her spironolactone given the patient's hypokalemia and begin IV loop diuretics once potassium levels improve.  GI consulted.  Liver cirrhosis secondary to HYATT (nonalcoholic steatohepatitis) (HCC)  History of HYATT cirrhosis.  Recent EGD without evidence of varices  Currently on rifaximin, lactulose, carvedilol, and torsemide. States she has not been taking spironolactone.   - Of note: patient did not sound confident in endorsing adherence to her diuretic regimen.  Decompensated with anasarca  Now that k improved to 3 will give additional potassium and start lasix 40 mg iv bid with monitoring electrolytes - will give another dose of kcl 40 meq po x1 and iv 20 meq x1  Am labs  Daily weight   I and   Ammonia 77 no encephalopathy so will not trend   With lactulose aim at 3 bm a day  Us of abdomen to assess for ascites   She states she gained 30 lb and is usually about 250-260 lb   Hypokalemia  Now 3 post repletion   Will give additional kcl 40 meq po x1 and 20 meq iv x1 and check magnesium   Bmp at 6 pm   Iron deficiency anemia  Hemoglobin 8.1 on arrival.now 7.2 will transfuse if less then 7.  Unclear what her baseline is after recent rectal bleed.  Previous appeared to be between 9 and 10.  No current evidence of blood loss.  Continue to monitor.  Fibromyalgia  History of chronic, diffuse pain. Will continue home regimen.  Chronic, continuous use of opioids  Continue home pain  regimen.oxycodone prn   Class 3 severe obesity due to excess calories in adult (HCC)  Body mass index is 49.46 kg/m².  Recommend incorporating a more whole foods plant-predominant diet along with decreasing consumption of red meats and processed foods  Per AHA guidelines, recommend moderate-vigorous intensity exercise for 30 minutes a day for 5 days a week or a total of 150 min/week   Primary hypertension  Bp stable on coreg     VTE Pharmacologic Prophylaxis: VTE Score: 4 Moderate Risk (Score 3-4) - Pharmacological DVT Prophylaxis Ordered: heparin.    Mobility:   Basic Mobility Inpatient Raw Score: 24  JH-HLM Goal: 8: Walk 250 feet or more  JH-HLM Achieved: 7: Walk 25 feet or more  JH-HLM Goal achieved. Continue to encourage appropriate mobility.    Patient Centered Rounds: I performed bedside rounds with nursing staff today.   Discussions with Specialists or Other Care Team Provider: none    Education and Discussions with Family / Patient: pt will update family     Current Length of Stay: 1 day(s)  Current Patient Status: Inpatient   Certification Statement: The patient will continue to require additional inpatient hospital stay due to decomensated liver cirrhosis   Discharge Plan: Anticipate discharge in >72 hrs to home.    Code Status: Level 1 - Full Code    Subjective   Seen and examined feels swollen    Objective :  Temp:  [98 °F (36.7 °C)-98.6 °F (37 °C)] 98.2 °F (36.8 °C)  HR:  [84-94] 84  BP: (110-167)/(51-84) 114/59  Resp:  [16-20] 18  SpO2:  [92 %-100 %] 98 %  O2 Device: None (Room air)    Body mass index is 49.46 kg/m².     Input and Output Summary (last 24 hours):     Intake/Output Summary (Last 24 hours) at 2/4/2025 1414  Last data filed at 2/4/2025 1336  Gross per 24 hour   Intake 800 ml   Output 1400 ml   Net -600 ml       Physical Exam  Vitals and nursing note reviewed.   Constitutional:       General: She is not in acute distress.     Appearance: She is well-developed.   HENT:      Head:  Normocephalic and atraumatic.   Eyes:      Conjunctiva/sclera: Conjunctivae normal.   Cardiovascular:      Rate and Rhythm: Normal rate and regular rhythm.      Heart sounds: No murmur heard.  Pulmonary:      Effort: Pulmonary effort is normal. No respiratory distress.      Breath sounds: Rales present.   Abdominal:      General: Bowel sounds are normal. There is distension.      Palpations: Abdomen is soft.      Tenderness: There is no abdominal tenderness.   Musculoskeletal:         General: Swelling present.      Cervical back: Neck supple.   Skin:     General: Skin is warm and dry.      Capillary Refill: Capillary refill takes less than 2 seconds.   Neurological:      Mental Status: She is alert and oriented to person, place, and time.   Psychiatric:         Mood and Affect: Mood normal.           Lines/Drains:              Lab Results: I have reviewed the following results:   Results from last 7 days   Lab Units 02/04/25  0456 02/03/25  1734   WBC Thousand/uL 2.30* 4.86   HEMOGLOBIN g/dL 7.2* 8.1*   HEMATOCRIT % 23.0* 26.5*   PLATELETS Thousands/uL 75* 104*   SEGS PCT %  --  62   LYMPHO PCT %  --  26   MONO PCT %  --  7   EOS PCT %  --  3     Results from last 7 days   Lab Units 02/04/25  1334 02/03/25  2207 02/03/25  1734   SODIUM mmol/L 138   < > 136   POTASSIUM mmol/L 3.0*   < > 2.6*   CHLORIDE mmol/L 97   < > 95*   CO2 mmol/L 38*   < > 37*   BUN mg/dL 11   < > 12   CREATININE mg/dL 1.19   < > 1.16   ANION GAP mmol/L 3*   < > 4   CALCIUM mg/dL 7.3*   < > 7.3*   ALBUMIN g/dL  --   --  2.3*   TOTAL BILIRUBIN mg/dL  --   --  1.36*   ALK PHOS U/L  --   --  245*   ALT U/L  --   --  34   AST U/L  --   --  46*   GLUCOSE RANDOM mg/dL 222*   < > 96    < > = values in this interval not displayed.                       Recent Cultures (last 7 days):         Imaging Results Review: No pertinent imaging studies reviewed.  Other Study Results Review: No additional pertinent studies reviewed.    Last 24 Hours Medication  List:     Current Facility-Administered Medications:     acetaminophen (TYLENOL) tablet 488 mg, Q4H PRN    amitriptyline (ELAVIL) tablet 75 mg, HS    carvedilol (COREG) tablet 3.125 mg, BID With Meals    cyclobenzaprine (FLEXERIL) tablet 10 mg, TID PRN    dapsone tablet 50 mg, Daily    folic acid (FOLVITE) tablet 1 mg, Daily    furosemide (LASIX) injection 40 mg, Q12H    heparin (porcine) subcutaneous injection 5,000 Units, Q8H MIGUEL    lactulose (CHRONULAC) oral solution 30 g, 4x Daily    lidocaine (LIDODERM) 5 % patch 1 patch, Daily    nystatin (MYCOSTATIN) powder, BID    oxyCODONE (ROXICODONE) immediate release tablet 10 mg, Q6H PRN    polyethylene glycol (MIRALAX) packet 17 g, Daily PRN    potassium chloride (Klor-Con M20) CR tablet 40 mEq, Once    potassium chloride 20 mEq IVPB (premix), Once    rifaximin (XIFAXAN) tablet 550 mg, Q12H MIGUEL    sertraline (ZOLOFT) tablet 100 mg, Daily    spironolactone (ALDACTONE) tablet 25 mg, Daily    trimethobenzamide (TIGAN) IM injection 200 mg, Q6H PRN    ursodiol (ACTIGALL) capsule 300 mg, BID    Administrative Statements   Today, Patient Was Seen By: Maryjane Whatley MD  I have spent a total time of >35 minutes in caring for this patient on the day of the visit/encounter including Patient and family education, Documenting in the medical record, and Reviewing / ordering tests, medicine, procedures  .    **Please Note: This note may have been constructed using a voice recognition system.**

## 2025-02-04 NOTE — ASSESSMENT & PLAN NOTE
Now 3 post repletion   Will give additional kcl 40 meq po x1 and 20 meq iv x1 and check magnesium   Bmp at 6 pm

## 2025-02-05 LAB
ABO GROUP BLD: NORMAL
ANION GAP SERPL CALCULATED.3IONS-SCNC: 2 MMOL/L (ref 4–13)
BASOPHILS # BLD AUTO: 0.02 THOUSANDS/ÂΜL (ref 0–0.1)
BASOPHILS NFR BLD AUTO: 1 % (ref 0–1)
BLD GP AB SCN SERPL QL: NEGATIVE
BUN SERPL-MCNC: 11 MG/DL (ref 5–25)
CALCIUM SERPL-MCNC: 7.2 MG/DL (ref 8.4–10.2)
CHLORIDE SERPL-SCNC: 98 MMOL/L (ref 96–108)
CO2 SERPL-SCNC: 39 MMOL/L (ref 21–32)
CREAT SERPL-MCNC: 1.05 MG/DL (ref 0.6–1.3)
EOSINOPHIL # BLD AUTO: 0.05 THOUSAND/ÂΜL (ref 0–0.61)
EOSINOPHIL NFR BLD AUTO: 3 % (ref 0–6)
ERYTHROCYTE [DISTWIDTH] IN BLOOD BY AUTOMATED COUNT: 18 % (ref 11.6–15.1)
GFR SERPL CREATININE-BSD FRML MDRD: 59 ML/MIN/1.73SQ M
GLUCOSE SERPL-MCNC: 100 MG/DL (ref 65–140)
HCT VFR BLD AUTO: 21.9 % (ref 34.8–46.1)
HGB BLD-MCNC: 6.6 G/DL (ref 11.5–15.4)
IMM GRANULOCYTES # BLD AUTO: 0 THOUSAND/UL (ref 0–0.2)
IMM GRANULOCYTES NFR BLD AUTO: 0 % (ref 0–2)
LYMPHOCYTES # BLD AUTO: 0.71 THOUSANDS/ÂΜL (ref 0.6–4.47)
LYMPHOCYTES NFR BLD AUTO: 38 % (ref 14–44)
MAGNESIUM SERPL-MCNC: 1.8 MG/DL (ref 1.9–2.7)
MCH RBC QN AUTO: 29.6 PG (ref 26.8–34.3)
MCHC RBC AUTO-ENTMCNC: 30.1 G/DL (ref 31.4–37.4)
MCV RBC AUTO: 98 FL (ref 82–98)
MONOCYTES # BLD AUTO: 0.13 THOUSAND/ÂΜL (ref 0.17–1.22)
MONOCYTES NFR BLD AUTO: 7 % (ref 4–12)
NEUTROPHILS # BLD AUTO: 0.97 THOUSANDS/ÂΜL (ref 1.85–7.62)
NEUTS SEG NFR BLD AUTO: 51 % (ref 43–75)
NRBC BLD AUTO-RTO: 0 /100 WBCS
PLATELET # BLD AUTO: 67 THOUSANDS/UL (ref 149–390)
PMV BLD AUTO: 11.3 FL (ref 8.9–12.7)
POTASSIUM SERPL-SCNC: 3.4 MMOL/L (ref 3.5–5.3)
POTASSIUM SERPL-SCNC: 3.5 MMOL/L (ref 3.5–5.3)
RBC # BLD AUTO: 2.23 MILLION/UL (ref 3.81–5.12)
RH BLD: POSITIVE
SODIUM SERPL-SCNC: 139 MMOL/L (ref 135–147)
SPECIMEN EXPIRATION DATE: NORMAL
WBC # BLD AUTO: 1.88 THOUSAND/UL (ref 4.31–10.16)

## 2025-02-05 PROCEDURE — 80048 BASIC METABOLIC PNL TOTAL CA: CPT | Performed by: FAMILY MEDICINE

## 2025-02-05 PROCEDURE — 86923 COMPATIBILITY TEST ELECTRIC: CPT

## 2025-02-05 PROCEDURE — 83735 ASSAY OF MAGNESIUM: CPT | Performed by: FAMILY MEDICINE

## 2025-02-05 PROCEDURE — 86850 RBC ANTIBODY SCREEN: CPT | Performed by: FAMILY MEDICINE

## 2025-02-05 PROCEDURE — 84132 ASSAY OF SERUM POTASSIUM: CPT | Performed by: FAMILY MEDICINE

## 2025-02-05 PROCEDURE — 99232 SBSQ HOSP IP/OBS MODERATE 35: CPT | Performed by: FAMILY MEDICINE

## 2025-02-05 PROCEDURE — 86901 BLOOD TYPING SEROLOGIC RH(D): CPT | Performed by: FAMILY MEDICINE

## 2025-02-05 PROCEDURE — 86900 BLOOD TYPING SEROLOGIC ABO: CPT | Performed by: FAMILY MEDICINE

## 2025-02-05 PROCEDURE — 85025 COMPLETE CBC W/AUTO DIFF WBC: CPT | Performed by: FAMILY MEDICINE

## 2025-02-05 PROCEDURE — 30233N1 TRANSFUSION OF NONAUTOLOGOUS RED BLOOD CELLS INTO PERIPHERAL VEIN, PERCUTANEOUS APPROACH: ICD-10-PCS | Performed by: FAMILY MEDICINE

## 2025-02-05 PROCEDURE — P9016 RBC LEUKOCYTES REDUCED: HCPCS

## 2025-02-05 RX ORDER — ALBUMIN (HUMAN) 12.5 G/50ML
25 SOLUTION INTRAVENOUS ONCE
Status: COMPLETED | OUTPATIENT
Start: 2025-02-05 | End: 2025-02-05

## 2025-02-05 RX ORDER — FUROSEMIDE 10 MG/ML
60 INJECTION INTRAMUSCULAR; INTRAVENOUS EVERY 12 HOURS
Status: COMPLETED | OUTPATIENT
Start: 2025-02-05 | End: 2025-02-05

## 2025-02-05 RX ORDER — POTASSIUM CHLORIDE 1500 MG/1
40 TABLET, EXTENDED RELEASE ORAL ONCE
Status: COMPLETED | OUTPATIENT
Start: 2025-02-05 | End: 2025-02-05

## 2025-02-05 RX ORDER — MAGNESIUM SULFATE HEPTAHYDRATE 40 MG/ML
2 INJECTION, SOLUTION INTRAVENOUS ONCE
Status: COMPLETED | OUTPATIENT
Start: 2025-02-05 | End: 2025-02-05

## 2025-02-05 RX ORDER — ALBUMIN (HUMAN) 12.5 G/50ML
12.5 SOLUTION INTRAVENOUS ONCE
Status: COMPLETED | OUTPATIENT
Start: 2025-02-05 | End: 2025-02-05

## 2025-02-05 RX ORDER — MIDODRINE HYDROCHLORIDE 5 MG/1
10 TABLET ORAL
Status: DISCONTINUED | OUTPATIENT
Start: 2025-02-05 | End: 2025-02-09

## 2025-02-05 RX ORDER — POTASSIUM CHLORIDE 1500 MG/1
40 TABLET, EXTENDED RELEASE ORAL EVERY 4 HOURS
Status: COMPLETED | OUTPATIENT
Start: 2025-02-05 | End: 2025-02-06

## 2025-02-05 RX ORDER — FUROSEMIDE 10 MG/ML
60 INJECTION INTRAMUSCULAR; INTRAVENOUS EVERY 12 HOURS
Status: DISCONTINUED | OUTPATIENT
Start: 2025-02-05 | End: 2025-02-06

## 2025-02-05 RX ORDER — FUROSEMIDE 10 MG/ML
40 INJECTION INTRAMUSCULAR; INTRAVENOUS EVERY 12 HOURS
Status: DISCONTINUED | OUTPATIENT
Start: 2025-02-05 | End: 2025-02-05

## 2025-02-05 RX ORDER — MIDODRINE HYDROCHLORIDE 5 MG/1
5 TABLET ORAL ONCE
Status: COMPLETED | OUTPATIENT
Start: 2025-02-05 | End: 2025-02-05

## 2025-02-05 RX ORDER — POTASSIUM CHLORIDE 1500 MG/1
40 TABLET, EXTENDED RELEASE ORAL 2 TIMES DAILY
Status: DISCONTINUED | OUTPATIENT
Start: 2025-02-05 | End: 2025-02-09

## 2025-02-05 RX ADMIN — OXYCODONE HYDROCHLORIDE 10 MG: 10 TABLET ORAL at 16:42

## 2025-02-05 RX ADMIN — ALBUMIN (HUMAN) 25 G: 0.25 INJECTION, SOLUTION INTRAVENOUS at 11:08

## 2025-02-05 RX ADMIN — LACTULOSE 30 G: 20 SOLUTION ORAL at 09:51

## 2025-02-05 RX ADMIN — FOLIC ACID 1 MG: 1 TABLET ORAL at 09:50

## 2025-02-05 RX ADMIN — MIDODRINE HYDROCHLORIDE 5 MG: 5 TABLET ORAL at 10:02

## 2025-02-05 RX ADMIN — SERTRALINE 100 MG: 100 TABLET, FILM COATED ORAL at 09:50

## 2025-02-05 RX ADMIN — TRIMETHOBENZAMIDE HYDROCHLORIDE 200 MG: 100 INJECTION INTRAMUSCULAR at 13:03

## 2025-02-05 RX ADMIN — POTASSIUM CHLORIDE 40 MEQ: 1500 TABLET, EXTENDED RELEASE ORAL at 09:50

## 2025-02-05 RX ADMIN — MIDODRINE HYDROCHLORIDE 5 MG: 5 TABLET ORAL at 06:43

## 2025-02-05 RX ADMIN — RIFAXIMIN 550 MG: 550 TABLET ORAL at 09:50

## 2025-02-05 RX ADMIN — LIDOCAINE 5% 1 PATCH: 700 PATCH TOPICAL at 10:03

## 2025-02-05 RX ADMIN — URSODIOL 300 MG: 300 CAPSULE ORAL at 18:26

## 2025-02-05 RX ADMIN — FUROSEMIDE 60 MG: 10 INJECTION, SOLUTION INTRAMUSCULAR; INTRAVENOUS at 11:01

## 2025-02-05 RX ADMIN — AMITRIPTYLINE HYDROCHLORIDE 75 MG: 25 TABLET, FILM COATED ORAL at 21:01

## 2025-02-05 RX ADMIN — CYCLOBENZAPRINE 10 MG: 10 TABLET, FILM COATED ORAL at 09:59

## 2025-02-05 RX ADMIN — OXYCODONE HYDROCHLORIDE 10 MG: 10 TABLET ORAL at 09:59

## 2025-02-05 RX ADMIN — POTASSIUM CHLORIDE 20 MEQ: 200 INJECTION, SOLUTION INTRAVENOUS at 01:28

## 2025-02-05 RX ADMIN — POTASSIUM CHLORIDE 40 MEQ: 1500 TABLET, EXTENDED RELEASE ORAL at 18:26

## 2025-02-05 RX ADMIN — POTASSIUM CHLORIDE 40 MEQ: 1500 TABLET, EXTENDED RELEASE ORAL at 21:01

## 2025-02-05 RX ADMIN — MIDODRINE HYDROCHLORIDE 10 MG: 5 TABLET ORAL at 18:26

## 2025-02-05 RX ADMIN — MIDODRINE HYDROCHLORIDE 10 MG: 5 TABLET ORAL at 11:58

## 2025-02-05 RX ADMIN — CYCLOBENZAPRINE 10 MG: 10 TABLET, FILM COATED ORAL at 18:26

## 2025-02-05 RX ADMIN — LACTULOSE 30 G: 20 SOLUTION ORAL at 18:22

## 2025-02-05 RX ADMIN — RIFAXIMIN 550 MG: 550 TABLET ORAL at 21:01

## 2025-02-05 RX ADMIN — LACTULOSE 30 G: 20 SOLUTION ORAL at 21:03

## 2025-02-05 RX ADMIN — DAPSONE 50 MG: 25 TABLET ORAL at 10:00

## 2025-02-05 RX ADMIN — URSODIOL 300 MG: 300 CAPSULE ORAL at 09:50

## 2025-02-05 RX ADMIN — MAGNESIUM SULFATE HEPTAHYDRATE 2 G: 40 INJECTION, SOLUTION INTRAVENOUS at 10:27

## 2025-02-05 RX ADMIN — ALBUMIN (HUMAN) 12.5 G: 0.25 INJECTION, SOLUTION INTRAVENOUS at 09:32

## 2025-02-05 RX ADMIN — LACTULOSE 30 G: 20 SOLUTION ORAL at 11:59

## 2025-02-05 RX ADMIN — FUROSEMIDE 60 MG: 10 INJECTION, SOLUTION INTRAMUSCULAR; INTRAVENOUS at 18:29

## 2025-02-05 RX ADMIN — NYSTATIN: 100000 POWDER TOPICAL at 10:04

## 2025-02-05 RX ADMIN — HEPARIN SODIUM 5000 UNITS: 5000 INJECTION INTRAVENOUS; SUBCUTANEOUS at 06:43

## 2025-02-05 NOTE — ASSESSMENT & PLAN NOTE
Body mass index is 49.66 kg/m².  Recommend incorporating a more whole foods plant-predominant diet along with decreasing consumption of red meats and processed foods  Per AHA guidelines, recommend moderate-vigorous intensity exercise for 30 minutes a day for 5 days a week or a total of 150 min/week

## 2025-02-05 NOTE — PROGRESS NOTES
Progress Note - Hospitalist   Name: Bailey Olsen 54 y.o. female I MRN: [id number]  Unit/Bed#: -01 I Date of Admission: 2/3/2025   Date of Service: 2/5/2025 I Hospital Day: 2    Assessment & Plan  Liver cirrhosis secondary to HYATT (nonalcoholic steatohepatitis) (HCC)  History of HYATT cirrhosis.  Recent EGD without evidence of varices  Currently on rifaximin, lactulose, carvedilol, and torsemide. States she has not been taking spironolactone.   - Of note: patient did not sound confident in endorsing adherence to her diuretic regimen.  Decompensated with anasarca  Ultrasound shows trace ascites  Potassium is stable supplement extra start Lasix 60 mg IV twice daily along with midodrine 10 mg p.o. 3 times daily for blood pressure support for now we will hold aldactone  will repeat a BMP at 6 to check a potassium and also will also give albumin 25% 25 g.  Daily weight   I and   Ammonia 77 no encephalopathy so will not trend   With lactulose aim at 3 bm a day will put a stool record for now she had 1 in the morning  She states she gained 30 lb and is usually about 250-260 lb   Hypokalemia  Resolved will supplement extra as will be started on diuresis will supplement also magnesium 2 g IV x 1  Iron deficiency anemia  Hemoglobin 8.1 on arrival.now 6.6 at this time she denies hematochezia or melena as opposed to her recent hospitalization unclear what her baseline is after recent rectal bleed but she was discharged with 8.1 from [place].  Previous appeared to be between 9 and 10.  No current evidence of blood loss.  Continue to monitor.  Asked GI to evaluate she just had an EGD without any acute bleed colonoscopy was incomplete but there was a clip placed as there was a lot of blood seen but nothing active bleeding will discuss with GI if they are planning to repeat scopes will repeat hemoglobin tomorrow and see stability transfuse doing 2 PRBCs consent obtained.  Obtain occult hold heparin  Fibromyalgia  History of  chronic, diffuse pain. Will continue home regimen.  Chronic, continuous use of opioids  Continue home pain regimen.oxycodone prn   Class 3 severe obesity due to excess calories in adult (HCC)  Body mass index is 49.66 kg/m².  Recommend incorporating a more whole foods plant-predominant diet along with decreasing consumption of red meats and processed foods  Per AHA guidelines, recommend moderate-vigorous intensity exercise for 30 minutes a day for 5 days a week or a total of 150 min/week   Primary hypertension  Patient has soft BPs that eating midodrine to diurese with discontinue Coreg for now  Pancytopenia (HCC)  Secondary to liver cirrhosis    VTE Pharmacologic Prophylaxis: VTE Score: 4 Moderate Risk (Score 3-4) - Pharmacological DVT Prophylaxis Contraindicated. Sequential Compression Devices Ordered.    Mobility:   Basic Mobility Inpatient Raw Score: 21  JH-HLM Goal: 6: Walk 10 steps or more  JH-HLM Achieved: 6: Walk 10 steps or more  JH-HLM Goal achieved. Continue to encourage appropriate mobility.    Patient Centered Rounds: I performed bedside rounds with nursing staff today.   Discussions with Specialists or Other Care Team Provider: None    Education and Discussions with Family / Patient: Patient declined call to .     Current Length of Stay: 2 day(s)  Current Patient Status: Inpatient   Certification Statement: The patient will continue to require additional inpatient hospital stay due to decompensated liver cirrhosis anemia  Discharge Plan: Anticipate discharge in >72 hrs to home.    Code Status: Level 1 - Full Code    Subjective   Patient seen and examined some abdominal pain    Objective :  Temp:  [97.4 °F (36.3 °C)-97.5 °F (36.4 °C)] 97.4 °F (36.3 °C)  HR:  [78-85] 80  BP: ()/(45-66) 114/61  Resp:  [16-18] 16  SpO2:  [92 %-100 %] 100 %  O2 Device: None (Room air)    Body mass index is 49.66 kg/m².     Input and Output Summary (last 24 hours):     Intake/Output Summary (Last 24  hours) at 2/5/2025 1045  Last data filed at 2/5/2025 0803  Gross per 24 hour   Intake 780 ml   Output 900 ml   Net -120 ml       Physical Exam  Vitals and nursing note reviewed.   Constitutional:       General: She is not in acute distress.     Appearance: She is well-developed.   HENT:      Head: Normocephalic and atraumatic.   Eyes:      Conjunctiva/sclera: Conjunctivae normal.   Cardiovascular:      Rate and Rhythm: Normal rate and regular rhythm.      Heart sounds: No murmur heard.  Pulmonary:      Effort: Pulmonary effort is normal. No respiratory distress.      Breath sounds: Wheezing and rales present.   Abdominal:      Palpations: Abdomen is soft.      Tenderness: There is no abdominal tenderness.   Musculoskeletal:         General: Swelling present.      Cervical back: Neck supple.   Skin:     General: Skin is warm and dry.      Capillary Refill: Capillary refill takes less than 2 seconds.   Neurological:      Mental Status: She is alert and oriented to person, place, and time.   Psychiatric:         Mood and Affect: Mood normal.           Lines/Drains:              Lab Results: I have reviewed the following results:   Results from last 7 days   Lab Units 02/05/25  0536   WBC Thousand/uL 1.88*   HEMOGLOBIN g/dL 6.6*   HEMATOCRIT % 21.9*   PLATELETS Thousands/uL 67*   SEGS PCT % 51   LYMPHO PCT % 38   MONO PCT % 7   EOS PCT % 3     Results from last 7 days   Lab Units 02/05/25  0536 02/03/25  2207 02/03/25  1734   SODIUM mmol/L 139   < > 136   POTASSIUM mmol/L 3.5   < > 2.6*   CHLORIDE mmol/L 98   < > 95*   CO2 mmol/L 39*   < > 37*   BUN mg/dL 11   < > 12   CREATININE mg/dL 1.05   < > 1.16   ANION GAP mmol/L 2*   < > 4   CALCIUM mg/dL 7.2*   < > 7.3*   ALBUMIN g/dL  --   --  2.3*   TOTAL BILIRUBIN mg/dL  --   --  1.36*   ALK PHOS U/L  --   --  245*   ALT U/L  --   --  34   AST U/L  --   --  46*   GLUCOSE RANDOM mg/dL 100   < > 96    < > = values in this interval not displayed.                       Recent  Cultures (last 7 days):         Imaging Results Review: I reviewed radiology reports from this admission including: Ultrasound(s).  Other Study Results Review: No additional pertinent studies reviewed.    Last 24 Hours Medication List:     Current Facility-Administered Medications:     acetaminophen (TYLENOL) tablet 488 mg, Q4H PRN    albumin human (FLEXBUMIN) 25 % injection 25 g, Once    amitriptyline (ELAVIL) tablet 75 mg, HS    cyclobenzaprine (FLEXERIL) tablet 10 mg, TID PRN    dapsone tablet 50 mg, Daily    folic acid (FOLVITE) tablet 1 mg, Daily    furosemide (LASIX) injection 60 mg, Q12H    lactulose (CHRONULAC) oral solution 30 g, 4x Daily    lidocaine (LIDODERM) 5 % patch 1 patch, Daily    magnesium sulfate 2 g/50 mL IVPB (premix) 2 g, Once, Last Rate: 2 g (02/05/25 1027)    midodrine (PROAMATINE) tablet 10 mg, TID AC    nystatin (MYCOSTATIN) powder, BID    oxyCODONE (ROXICODONE) immediate release tablet 10 mg, Q6H PRN    polyethylene glycol (MIRALAX) packet 17 g, Daily PRN    potassium chloride (Klor-Con M20) CR tablet 40 mEq, BID    rifaximin (XIFAXAN) tablet 550 mg, Q12H MIGUEL    sertraline (ZOLOFT) tablet 100 mg, Daily    trimethobenzamide (TIGAN) IM injection 200 mg, Q6H PRN    ursodiol (ACTIGALL) capsule 300 mg, BID    Administrative Statements   Today, Patient Was Seen By: Maryjane Whatley MD  I have spent a total time of >35 minutes in caring for this patient on the day of the visit/encounter including Documenting in the medical record and Reviewing / ordering tests, medicine, procedures  .    **Please Note: This note may have been constructed using a voice recognition system.**

## 2025-02-05 NOTE — PLAN OF CARE
Problem: PAIN - ADULT  Goal: Verbalizes/displays adequate comfort level or baseline comfort level  Description: Interventions:  - Encourage patient to monitor pain and request assistance  - Assess pain using appropriate pain scale  - Administer analgesics based on type and severity of pain and evaluate response  - Implement non-pharmacological measures as appropriate and evaluate response  - Consider cultural and social influences on pain and pain management  - Notify physician/advanced practitioner if interventions unsuccessful or patient reports new pain  Outcome: Progressing     Problem: INFECTION - ADULT  Goal: Absence or prevention of progression during hospitalization  Description: INTERVENTIONS:  - Assess and monitor for signs and symptoms of infection  - Monitor lab/diagnostic results  - Monitor all insertion sites, i.e. indwelling lines, tubes, and drains  - Monitor endotracheal if appropriate and nasal secretions for changes in amount and color  - [place] appropriate cooling/warming therapies per order  - Administer medications as ordered  - Instruct and encourage patient and family to use good hand hygiene technique  - Identify and instruct in appropriate isolation precautions for identified infection/condition  Outcome: Progressing  Goal: Absence of fever/infection during neutropenic period  Description: INTERVENTIONS:  - Monitor WBC    Outcome: Progressing     Problem: SAFETY ADULT  Goal: Patient will remain free of falls  Description: INTERVENTIONS:  - Educate patient/family on patient safety including physical limitations  - Instruct patient to call for assistance with activity   - Consult OT/PT to assist with strengthening/mobility   - Keep Call bell within reach  - Keep bed low and locked with side rails adjusted as appropriate  - Keep care items and personal belongings within reach  - Initiate and maintain comfort rounds  - Make Fall Risk Sign visible to staff  - Offer Toileting every 2 Hours,  in advance of need  - Initiate/Maintain bed alarm  - Obtain necessary fall risk management equipment: walker  - Apply yellow socks and bracelet for high fall risk patients  - Consider moving patient to room near nurses station  Outcome: Progressing  Goal: Maintain or return to baseline ADL function  Description: INTERVENTIONS:  -  Assess patient's ability to carry out ADLs; assess patient's baseline for ADL function and identify physical deficits which impact ability to perform ADLs (bathing, care of mouth/teeth, toileting, grooming, dressing, etc.)  - Assess/evaluate cause of self-care deficits   - Assess range of motion  - Assess patient's mobility; develop plan if impaired  - Assess patient's need for assistive devices and provide as appropriate  - Encourage maximum independence but intervene and supervise when necessary  - Involve family in performance of ADLs  - Assess for home care needs following discharge   - Consider OT consult to assist with ADL evaluation and planning for discharge  - Provide patient education as appropriate  Outcome: Progressing  Goal: Maintains/Returns to pre admission functional level  Description: INTERVENTIONS:  - Perform AM-PAC 6 Click Basic Mobility/ Daily Activity assessment daily.  - Set and communicate daily mobility goal to care team and patient/family/caregiver.   - Collaborate with rehabilitation services on mobility goals if consulted  - Perform Range of Motion 3 times a day.  - Reposition patient every 2 hours.  - Dangle patient 3 times a day  - Stand patient 3 times a day  - Ambulate patient 3 times a day  - Out of bed to chair 3 times a day   - Out of bed for meals 3 times a day  - Out of bed for toileting  - Record patient progress and toleration of activity level   Outcome: Progressing     Problem: DISCHARGE PLANNING  Goal: Discharge to home or other facility with appropriate resources  Description: INTERVENTIONS:  - Identify barriers to discharge w/patient and  caregiver  - Arrange for needed discharge resources and transportation as appropriate  - Identify discharge learning needs (meds, wound care, etc.)  - Arrange for interpretive services to assist at discharge as needed  - Refer to Case Management Department for coordinating discharge planning if the patient needs post-hospital services based on physician/advanced practitioner order or complex needs related to functional status, cognitive ability, or social support system  Outcome: Progressing     Problem: Knowledge Deficit  Goal: Patient/family/caregiver demonstrates understanding of disease process, treatment plan, medications, and discharge instructions  Description: Complete learning assessment and assess knowledge base.  Interventions:  - Provide teaching at level of understanding  - Provide teaching via preferred learning methods  Outcome: Progressing

## 2025-02-05 NOTE — ASSESSMENT & PLAN NOTE
Resolved will supplement extra as will be started on diuresis will supplement also magnesium 2 g IV x 1

## 2025-02-05 NOTE — PLAN OF CARE
Problem: PAIN - ADULT  Goal: Verbalizes/displays adequate comfort level or baseline comfort level  Description: Interventions:  - Encourage patient to monitor pain and request assistance  - Assess pain using appropriate pain scale  - Administer analgesics based on type and severity of pain and evaluate response  - Implement non-pharmacological measures as appropriate and evaluate response  - Consider cultural and social influences on pain and pain management  - Notify physician/advanced practitioner if interventions unsuccessful or patient reports new pain  Outcome: Progressing     Problem: INFECTION - ADULT  Goal: Absence or prevention of progression during hospitalization  Description: INTERVENTIONS:  - Assess and monitor for signs and symptoms of infection  - Monitor lab/diagnostic results  - Monitor all insertion sites, i.e. indwelling lines, tubes, and drains  - Monitor endotracheal if appropriate and nasal secretions for changes in amount and color  - [place] appropriate cooling/warming therapies per order  - Administer medications as ordered  - Instruct and encourage patient and family to use good hand hygiene technique  - Identify and instruct in appropriate isolation precautions for identified infection/condition  Outcome: Progressing  Goal: Absence of fever/infection during neutropenic period  Description: INTERVENTIONS:  - Monitor WBC    Outcome: Progressing     Problem: SAFETY ADULT  Goal: Patient will remain free of falls  Description: INTERVENTIONS:  - Educate patient/family on patient safety including physical limitations  - Instruct patient to call for assistance with activity   - Consult OT/PT to assist with strengthening/mobility   - Keep Call bell within reach  - Keep bed low and locked with side rails adjusted as appropriate  - Keep care items and personal belongings within reach  - Initiate and maintain comfort rounds  - Make Fall Risk Sign visible to staff  - Offer Toileting every 2 Hours,  in advance of need  - Initiate/Maintain bed alarm  - Obtain necessary fall risk management equipment  - Apply yellow socks and bracelet for high fall risk patients  - Consider moving patient to room near nurses station  Outcome: Progressing  Goal: Maintain or return to baseline ADL function  Description: INTERVENTIONS:  -  Assess patient's ability to carry out ADLs; assess patient's baseline for ADL function and identify physical deficits which impact ability to perform ADLs (bathing, care of mouth/teeth, toileting, grooming, dressing, etc.)  - Assess/evaluate cause of self-care deficits   - Assess range of motion  - Assess patient's mobility; develop plan if impaired  - Assess patient's need for assistive devices and provide as appropriate  - Encourage maximum independence but intervene and supervise when necessary  - Involve family in performance of ADLs  - Assess for home care needs following discharge   - Consider OT consult to assist with ADL evaluation and planning for discharge  - Provide patient education as appropriate  Outcome: Progressing  Goal: Maintains/Returns to pre admission functional level  Description: INTERVENTIONS:  - Perform AM-PAC 6 Click Basic Mobility/ Daily Activity assessment daily.  - Set and communicate daily mobility goal to care team and patient/family/caregiver.   - Collaborate with rehabilitation services on mobility goals if consulted  - Reposition patient every 2 hours.  - Stand patient 3 times a day  - Ambulate patient 3 times a day  - Out of bed to chair 3 times a day   - Out of bed for meals 3 times a day  - Out of bed for toileting  - Record patient progress and toleration of activity level   Outcome: Progressing     Problem: DISCHARGE PLANNING  Goal: Discharge to home or other facility with appropriate resources  Description: INTERVENTIONS:  - Identify barriers to discharge w/patient and caregiver  - Arrange for needed discharge resources and transportation as appropriate  -  Identify discharge learning needs (meds, wound care, etc.)  - Arrange for interpretive services to assist at discharge as needed  - Refer to Case Management Department for coordinating discharge planning if the patient needs post-hospital services based on physician/advanced practitioner order or complex needs related to functional status, cognitive ability, or social support system  Outcome: Progressing     Problem: Knowledge Deficit  Goal: Patient/family/caregiver demonstrates understanding of disease process, treatment plan, medications, and discharge instructions  Description: Complete learning assessment and assess knowledge base.  Interventions:  - Provide teaching at level of understanding  - Provide teaching via preferred learning methods  Outcome: Progressing

## 2025-02-05 NOTE — ASSESSMENT & PLAN NOTE
Patient with hx of chronic ANGELICA with recent admission with rectal bleeding   Now admitted after 30lb weight gain in 2 weeks, currently being diuresed  Labs with Hgb 6.6 this AM, down from 7.5 on day of discharge about two weeks ago   No overt GI bleeding  Underwent EGD and colonoscopy 1/18 without source of bleeding identified    Given lack of overt GI bleeding, no plans for repeat procedures   Suspect low Hgb partially dilutional given patient is fluid overloaded   2U pRBCs ordered  Continue to monitor Hgb and for overt GI bleeding

## 2025-02-05 NOTE — ASSESSMENT & PLAN NOTE
Hemoglobin 8.1 on arrival.now 6.6 at this time she denies hematochezia or melena as opposed to her recent hospitalization unclear what her baseline is after recent rectal bleed but she was discharged with 8.1 from [facility].  Previous appeared to be between 9 and 10.  No current evidence of blood loss.  Continue to monitor.  Asked GI to evaluate she just had an EGD without any acute bleed colonoscopy was incomplete but there was a clip placed as there was a lot of blood seen but nothing active bleeding will discuss with GI if they are planning to repeat scopes will repeat hemoglobin tomorrow and see stability transfuse doing 2 PRBCs consent obtained.  Obtain occult hold heparin

## 2025-02-05 NOTE — CONSULTS
Consultation - Gastroenterology   Name: Bailey Olsen 54 y.o. female I MRN: [id number]  Unit/Bed#: -01 I Date of Admission: 2/3/2025   Date of Service: 2/5/2025 I Hospital Day: 2     Inpatient consult to gastroenterology  Consult performed by: Angely Tapia PA-C  Consult ordered by: Maryjane Whatley MD      Physician Requesting Evaluation: Maryjane Whatley MD   Reason for Evaluation / Principal Problem: ? bleed, acute anemia    Assessment & Plan  Iron deficiency anemia  Patient with hx of chronic ANGELICA with recent admission with rectal bleeding   Now admitted after 30lb weight gain in 2 weeks, currently being diuresed  Labs with Hgb 6.6 this AM, down from 7.5 on day of discharge about two weeks ago   No overt GI bleeding  Underwent EGD and colonoscopy 1/18 without source of bleeding identified    Given lack of overt GI bleeding, no plans for repeat procedures   Suspect low Hgb partially dilutional given patient is fluid overloaded   2U pRBCs ordered  Continue to monitor Hgb and for overt GI bleeding  Liver cirrhosis secondary to HYATT (nonalcoholic steatohepatitis) (HCC)  Recommend outpatient hepatology follow up   Chronic, continuous use of opioids  Limit given hx of HE    HPI: Bailey Olsen is a 54 y.o. 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     Patient recently admitted at [facility] 1/18/25 - 1/23/25 with acute anemia and rectal bleeding. She underwent EGD and colonoscopy on 1/18/25. EGD was unremarkable. Colonoscopy with poor prep, significant blood clots and looping, fresh blood noted. Consider CT bleeding scan for possible emobolization if recurrent bleeding. Fortunately the bleeding resolved and her Hgb remained stable. Hgb on day of discharge was 7.5.     Patient was seen at outpatient  cardiology appt on 2/3/25 and reported a 30lb weight gain since being discharged from the hospital two weeks prior. She was instructed to go to the ED for further evaluation and was admitted with anasarca.     GI has been consulted due to anemia.     Patient seen at bedside this AM. Denies any abdominal pain, nausea, vomiting, black or bloody stools.     - EGD 3/19/24 - grade 1 esophageal varices, repeat EGD 1 year.   - Colonoscopy 3/19/24 - 1 polyp + internal hemorrhoids, repeat 2034.     Endoscopic risk assessment:  Anticoagulation use: no  Diabetes medication: no  CVS history: no  Abdominal surgeries: see above  Sleep apnea:  O2 use:    Past Medical History:   Diagnosis Date    Back pain     Carcinoid tumor     neuroendocrine    CHF (congestive heart failure) (HCC)     Cirrhosis of liver (HCC)     Sarcoidosis      Past Surgical History:   Procedure Laterality Date    CHOLECYSTECTOMY      GASTRIC BYPASS      HERNIA REPAIR      IR KYPHOPLASTY/VERTEBROPLASTY  10/15/2024     Social History   Social History     Substance and Sexual Activity   Alcohol Use Never     Social History     Substance and Sexual Activity   Drug Use Never     Social History     Tobacco Use   Smoking Status Never   Smokeless Tobacco Never       Family History   Problem Relation Age of Onset    Hypertension Father          Medications Prior to Admission:     amitriptyline (ELAVIL) 75 mg tablet    aspirin-acetaminophen-caffeine (EXCEDRIN MIGRAINE) 250-250-65 MG per tablet    carvedilol (COREG) 3.125 mg tablet    cyclobenzaprine (FLEXERIL) 10 mg tablet    dapsone 25 mg tablet    folic acid (FOLVITE) 1 mg tablet    lactulose (CHRONULAC) 10 g/15 mL solution    lidocaine (LIDODERM) 5 %    octreotide (SandoSTATIN LAR DEPOT) 30 mg IM injection kit    oxyCODONE (ROXICODONE) 10 MG TABS    polyethylene glycol (MIRALAX) 17 g packet    rifaximin (XIFAXAN) 550 mg tablet    sertraline (ZOLOFT) 100 mg tablet    spironolactone (ALDACTONE) 50 mg tablet     torsemide (DEMADEX) 20 mg tablet    ursodiol (ACTIGALL) 300 mg capsule    gabapentin (NEURONTIN) 100 mg capsule    naloxone (NARCAN) 4 mg/0.1 mL nasal spray    nystatin (MYCOSTATIN) powder    oxyCODONE-acetaminophen (PERCOCET)  mg per tablet    pantoprazole (PROTONIX) 40 mg tablet    Current Facility-Administered Medications:     acetaminophen (TYLENOL) tablet 488 mg, Q4H PRN    albumin human (FLEXBUMIN) 25 % injection 12.5 g, Once    amitriptyline (ELAVIL) tablet 75 mg, HS    cyclobenzaprine (FLEXERIL) tablet 10 mg, TID PRN    dapsone tablet 50 mg, Daily    folic acid (FOLVITE) tablet 1 mg, Daily    lactulose (CHRONULAC) oral solution 30 g, 4x Daily    lidocaine (LIDODERM) 5 % patch 1 patch, Daily    magnesium sulfate 2 g/50 mL IVPB (premix) 2 g, Once    midodrine (PROAMATINE) tablet 10 mg, TID AC    midodrine (PROAMATINE) tablet 5 mg, Once    nystatin (MYCOSTATIN) powder, BID    oxyCODONE (ROXICODONE) immediate release tablet 10 mg, Q6H PRN    polyethylene glycol (MIRALAX) packet 17 g, Daily PRN    potassium chloride (Klor-Con M20) CR tablet 40 mEq, Once    rifaximin (XIFAXAN) tablet 550 mg, Q12H MIGUEL    sertraline (ZOLOFT) tablet 100 mg, Daily    trimethobenzamide (TIGAN) IM injection 200 mg, Q6H PRN    ursodiol (ACTIGALL) capsule 300 mg, BID  Allergies   Allergen Reactions    Zolpidem GI Intolerance, Other (See Comments) and Hives     Other Reaction(s): Other      Blacks out    Blacks out    Tapentadol Sneezing and Other (See Comments)     Zoned out    Alprazolam Other (See Comments)     Other Reaction(s): Other      Feels zoned out    Feels zoned out    Amoxicillin-Pot Clavulanate Hives and Nausea Only     Amoxil fine    Dust Mite Extract Sneezing    Morphine Headache, Nausea Only and Vomiting    Tramadol GI Intolerance    Azelastine Rash    Azelastine Hcl Rash    Molds & Smuts Rash     Other Reaction(s): Unknown       Physical Exam  Constitutional:       General: She is not in acute distress.      Appearance: She is not ill-appearing.   HENT:      Head: Normocephalic and atraumatic.      Mouth/Throat:      Mouth: Mucous membranes are moist.   Eyes:      General: No scleral icterus.  Pulmonary:      Effort: Pulmonary effort is normal. No respiratory distress.   Abdominal:      General: Abdomen is protuberant. There is no distension.      Tenderness: There is no abdominal tenderness. There is no guarding.   Skin:     General: Skin is warm and dry.      Coloration: Skin is not jaundiced.   Neurological:      Mental Status: She is alert.       Most Recent Vital Signs:  Vitals:    02/04/25 1853 02/04/25 2227 02/05/25 0537 02/05/25 0632   BP: 106/64 114/66  (!) 90/45   Pulse: 85 83  82   Resp:  18     Temp:       TempSrc:       SpO2: 100% 97%  92%   Weight:   135 kg (298 lb 6.4 oz)    Height:           Intake/Output Summary (Last 24 hours) at 2/5/2025 0854  Last data filed at 2/5/2025 0803  Gross per 24 hour   Intake 780 ml   Output 900 ml   Net -120 ml       LABS/IMAGING  Lab Results: I have reviewed all relevant lab results during this hospitalization.    Imaging Studies:  I have reviewed all the relevant images during this hospitalizations    Counseling / Coordination of Care  Total time spent today 45 minutes. Greater than 50% of total time was spent with the patient and / or family counseling and / or coordination of care.    Angely Tapia PA-C

## 2025-02-05 NOTE — ASSESSMENT & PLAN NOTE
History of HYATT cirrhosis.  Recent EGD without evidence of varices  Currently on rifaximin, lactulose, carvedilol, and torsemide. States she has not been taking spironolactone.   - Of note: patient did not sound confident in endorsing adherence to her diuretic regimen.  Decompensated with anasarca  Ultrasound shows trace ascites  Potassium is stable supplement extra start Lasix 60 mg IV twice daily along with midodrine 10 mg p.o. 3 times daily for blood pressure support for now we will hold aldactone  will repeat a BMP at 6 to check a potassium and also will also give albumin 25% 25 g.  Daily weight   I and   Ammonia 77 no encephalopathy so will not trend   With lactulose aim at 3 bm a day will put a stool record for now she had 1 in the morning  She states she gained 30 lb and is usually about 250-260 lb

## 2025-02-06 LAB
ABO GROUP BLD BPU: NORMAL
ABO GROUP BLD BPU: NORMAL
ALBUMIN SERPL BCG-MCNC: 2.6 G/DL (ref 3.5–5)
ALP SERPL-CCNC: 210 U/L (ref 34–104)
ALT SERPL W P-5'-P-CCNC: 23 U/L (ref 7–52)
ANION GAP SERPL CALCULATED.3IONS-SCNC: 1 MMOL/L (ref 4–13)
ANION GAP SERPL CALCULATED.3IONS-SCNC: 3 MMOL/L (ref 4–13)
AST SERPL W P-5'-P-CCNC: 39 U/L (ref 13–39)
BASOPHILS # BLD AUTO: 0.04 THOUSANDS/ÂΜL (ref 0–0.1)
BASOPHILS NFR BLD AUTO: 2 % (ref 0–1)
BILIRUB SERPL-MCNC: 3.31 MG/DL (ref 0.2–1)
BPU ID: NORMAL
BPU ID: NORMAL
BUN SERPL-MCNC: 10 MG/DL (ref 5–25)
BUN SERPL-MCNC: 11 MG/DL (ref 5–25)
CALCIUM ALBUM COR SERPL-MCNC: 9.3 MG/DL (ref 8.3–10.1)
CALCIUM SERPL-MCNC: 7.8 MG/DL (ref 8.4–10.2)
CALCIUM SERPL-MCNC: 8.2 MG/DL (ref 8.4–10.2)
CHLORIDE SERPL-SCNC: 100 MMOL/L (ref 96–108)
CHLORIDE SERPL-SCNC: 98 MMOL/L (ref 96–108)
CO2 SERPL-SCNC: 38 MMOL/L (ref 21–32)
CO2 SERPL-SCNC: 38 MMOL/L (ref 21–32)
CREAT SERPL-MCNC: 1 MG/DL (ref 0.6–1.3)
CREAT SERPL-MCNC: 1.08 MG/DL (ref 0.6–1.3)
CROSSMATCH: NORMAL
CROSSMATCH: NORMAL
EOSINOPHIL # BLD AUTO: 0.05 THOUSAND/ÂΜL (ref 0–0.61)
EOSINOPHIL NFR BLD AUTO: 2 % (ref 0–6)
ERYTHROCYTE [DISTWIDTH] IN BLOOD BY AUTOMATED COUNT: 18.2 % (ref 11.6–15.1)
GFR SERPL CREATININE-BSD FRML MDRD: 57 ML/MIN/1.73SQ M
GFR SERPL CREATININE-BSD FRML MDRD: 63 ML/MIN/1.73SQ M
GLUCOSE SERPL-MCNC: 101 MG/DL (ref 65–140)
GLUCOSE SERPL-MCNC: 92 MG/DL (ref 65–140)
HCT VFR BLD AUTO: 28.2 % (ref 34.8–46.1)
HGB BLD-MCNC: 8.7 G/DL (ref 11.5–15.4)
IMM GRANULOCYTES # BLD AUTO: 0 THOUSAND/UL (ref 0–0.2)
IMM GRANULOCYTES NFR BLD AUTO: 0 % (ref 0–2)
LYMPHOCYTES # BLD AUTO: 0.84 THOUSANDS/ÂΜL (ref 0.6–4.47)
LYMPHOCYTES NFR BLD AUTO: 33 % (ref 14–44)
MAGNESIUM SERPL-MCNC: 2 MG/DL (ref 1.9–2.7)
MCH RBC QN AUTO: 30.1 PG (ref 26.8–34.3)
MCHC RBC AUTO-ENTMCNC: 30.9 G/DL (ref 31.4–37.4)
MCV RBC AUTO: 98 FL (ref 82–98)
MONOCYTES # BLD AUTO: 0.19 THOUSAND/ÂΜL (ref 0.17–1.22)
MONOCYTES NFR BLD AUTO: 7 % (ref 4–12)
NEUTROPHILS # BLD AUTO: 1.46 THOUSANDS/ÂΜL (ref 1.85–7.62)
NEUTS SEG NFR BLD AUTO: 56 % (ref 43–75)
NRBC BLD AUTO-RTO: 0 /100 WBCS
PLATELET # BLD AUTO: 70 THOUSANDS/UL (ref 149–390)
PMV BLD AUTO: 10.5 FL (ref 8.9–12.7)
POTASSIUM SERPL-SCNC: 3.4 MMOL/L (ref 3.5–5.3)
POTASSIUM SERPL-SCNC: 4.4 MMOL/L (ref 3.5–5.3)
PROT SERPL-MCNC: 5.6 G/DL (ref 6.4–8.4)
RBC # BLD AUTO: 2.89 MILLION/UL (ref 3.81–5.12)
SODIUM SERPL-SCNC: 139 MMOL/L (ref 135–147)
SODIUM SERPL-SCNC: 139 MMOL/L (ref 135–147)
UNIT DISPENSE STATUS: NORMAL
UNIT DISPENSE STATUS: NORMAL
UNIT PRODUCT CODE: NORMAL
UNIT PRODUCT CODE: NORMAL
UNIT PRODUCT VOLUME: 300 ML
UNIT PRODUCT VOLUME: 300 ML
UNIT RH: NORMAL
UNIT RH: NORMAL
WBC # BLD AUTO: 2.58 THOUSAND/UL (ref 4.31–10.16)

## 2025-02-06 PROCEDURE — 99232 SBSQ HOSP IP/OBS MODERATE 35: CPT | Performed by: FAMILY MEDICINE

## 2025-02-06 PROCEDURE — 82272 OCCULT BLD FECES 1-3 TESTS: CPT | Performed by: FAMILY MEDICINE

## 2025-02-06 PROCEDURE — 97166 OT EVAL MOD COMPLEX 45 MIN: CPT

## 2025-02-06 PROCEDURE — 97535 SELF CARE MNGMENT TRAINING: CPT

## 2025-02-06 PROCEDURE — 83735 ASSAY OF MAGNESIUM: CPT | Performed by: FAMILY MEDICINE

## 2025-02-06 PROCEDURE — 85025 COMPLETE CBC W/AUTO DIFF WBC: CPT | Performed by: FAMILY MEDICINE

## 2025-02-06 PROCEDURE — 97162 PT EVAL MOD COMPLEX 30 MIN: CPT

## 2025-02-06 PROCEDURE — 80053 COMPREHEN METABOLIC PANEL: CPT | Performed by: FAMILY MEDICINE

## 2025-02-06 PROCEDURE — 80048 BASIC METABOLIC PNL TOTAL CA: CPT | Performed by: FAMILY MEDICINE

## 2025-02-06 RX ORDER — HEPARIN SODIUM 5000 [USP'U]/ML
5000 INJECTION, SOLUTION INTRAVENOUS; SUBCUTANEOUS EVERY 8 HOURS SCHEDULED
Status: DISCONTINUED | OUTPATIENT
Start: 2025-02-06 | End: 2025-02-07

## 2025-02-06 RX ORDER — FUROSEMIDE 10 MG/ML
15 SYRINGE (ML) INJECTION CONTINUOUS
Status: DISCONTINUED | OUTPATIENT
Start: 2025-02-06 | End: 2025-02-13

## 2025-02-06 RX ORDER — ALBUMIN (HUMAN) 12.5 G/50ML
25 SOLUTION INTRAVENOUS ONCE
Status: COMPLETED | OUTPATIENT
Start: 2025-02-06 | End: 2025-02-06

## 2025-02-06 RX ORDER — LACTULOSE 10 G/15ML
30 SOLUTION ORAL
Status: DISCONTINUED | OUTPATIENT
Start: 2025-02-06 | End: 2025-02-09

## 2025-02-06 RX ADMIN — OXYCODONE HYDROCHLORIDE 10 MG: 10 TABLET ORAL at 13:15

## 2025-02-06 RX ADMIN — ALBUMIN (HUMAN) 25 G: 0.25 INJECTION, SOLUTION INTRAVENOUS at 12:07

## 2025-02-06 RX ADMIN — POTASSIUM CHLORIDE 40 MEQ: 1500 TABLET, EXTENDED RELEASE ORAL at 17:11

## 2025-02-06 RX ADMIN — CYCLOBENZAPRINE 10 MG: 10 TABLET, FILM COATED ORAL at 14:09

## 2025-02-06 RX ADMIN — OXYCODONE HYDROCHLORIDE 10 MG: 10 TABLET ORAL at 21:43

## 2025-02-06 RX ADMIN — LIDOCAINE 5% 1 PATCH: 700 PATCH TOPICAL at 08:37

## 2025-02-06 RX ADMIN — URSODIOL 300 MG: 300 CAPSULE ORAL at 08:38

## 2025-02-06 RX ADMIN — HEPARIN SODIUM 5000 UNITS: 5000 INJECTION INTRAVENOUS; SUBCUTANEOUS at 08:38

## 2025-02-06 RX ADMIN — LACTULOSE 30 G: 20 SOLUTION ORAL at 08:38

## 2025-02-06 RX ADMIN — AMITRIPTYLINE HYDROCHLORIDE 75 MG: 25 TABLET, FILM COATED ORAL at 21:43

## 2025-02-06 RX ADMIN — NYSTATIN: 100000 POWDER TOPICAL at 17:11

## 2025-02-06 RX ADMIN — RIFAXIMIN 550 MG: 550 TABLET ORAL at 08:38

## 2025-02-06 RX ADMIN — NYSTATIN: 100000 POWDER TOPICAL at 08:39

## 2025-02-06 RX ADMIN — LACTULOSE 30 G: 20 SOLUTION ORAL at 17:00

## 2025-02-06 RX ADMIN — FUROSEMIDE 60 MG: 10 INJECTION, SOLUTION INTRAMUSCULAR; INTRAVENOUS at 05:13

## 2025-02-06 RX ADMIN — MIDODRINE HYDROCHLORIDE 10 MG: 5 TABLET ORAL at 17:00

## 2025-02-06 RX ADMIN — SERTRALINE 100 MG: 100 TABLET, FILM COATED ORAL at 08:38

## 2025-02-06 RX ADMIN — TRIMETHOBENZAMIDE HYDROCHLORIDE 200 MG: 100 INJECTION INTRAMUSCULAR at 10:47

## 2025-02-06 RX ADMIN — HEPARIN SODIUM 5000 UNITS: 5000 INJECTION INTRAVENOUS; SUBCUTANEOUS at 21:47

## 2025-02-06 RX ADMIN — LACTULOSE 30 G: 20 SOLUTION ORAL at 21:45

## 2025-02-06 RX ADMIN — RIFAXIMIN 550 MG: 550 TABLET ORAL at 21:43

## 2025-02-06 RX ADMIN — MIDODRINE HYDROCHLORIDE 10 MG: 5 TABLET ORAL at 06:09

## 2025-02-06 RX ADMIN — POTASSIUM CHLORIDE 40 MEQ: 1500 TABLET, EXTENDED RELEASE ORAL at 08:38

## 2025-02-06 RX ADMIN — FOLIC ACID 1 MG: 1 TABLET ORAL at 08:38

## 2025-02-06 RX ADMIN — HEPARIN SODIUM 5000 UNITS: 5000 INJECTION INTRAVENOUS; SUBCUTANEOUS at 13:19

## 2025-02-06 RX ADMIN — URSODIOL 300 MG: 300 CAPSULE ORAL at 17:11

## 2025-02-06 RX ADMIN — OXYCODONE HYDROCHLORIDE 10 MG: 10 TABLET ORAL at 05:20

## 2025-02-06 RX ADMIN — POTASSIUM CHLORIDE 40 MEQ: 1500 TABLET, EXTENDED RELEASE ORAL at 00:37

## 2025-02-06 RX ADMIN — LACTULOSE 30 G: 20 SOLUTION ORAL at 13:15

## 2025-02-06 RX ADMIN — Medication 12 MG/HR: at 09:50

## 2025-02-06 RX ADMIN — DAPSONE 50 MG: 25 TABLET ORAL at 08:39

## 2025-02-06 NOTE — ASSESSMENT & PLAN NOTE
Resolved post supplementation magnesium resolved post supplementation.  Patient on KCl 40 mill equivalents p.o. twice daily secondary to being on Lasix drip.

## 2025-02-06 NOTE — PROGRESS NOTES
Progress Note - Hospitalist   Name: Bailey Olsen 54 y.o. female I MRN: [id number]  Unit/Bed#: -01 I Date of Admission: 2/3/2025   Date of Service: 2/6/2025 I Hospital Day: 3    Assessment & Plan  Liver cirrhosis secondary to HYATT (nonalcoholic steatohepatitis) (HCC)  History of HAYTT cirrhosis.  Recent EGD without evidence of varices  Currently on rifaximin, lactulose, carvedilol, and torsemide. States she has not been taking spironolactone.   - Of note: patient did not sound confident in endorsing adherence to her diuretic regimen.  Decompensated with anasarca  Ultrasound shows trace ascites  No response to lasix 60 mg iv bid , dc start lasix drip at 12 mg/hr - k stable continue kcl 40 meq po bid and bmp q12  Daily weight   I and   Ammonia 77 no encephalopathy so will not trend   Only one bm for 2 consecutive days increase lactulose to 30 g 5 times a day I will need her to have at least 2-3 BMs  She states she gained 30 lb and is usually about 250-260 lb   Hypokalemia  Resolved post supplementation magnesium resolved post supplementation.  Patient on KCl 40 mill equivalents p.o. twice daily secondary to being on Lasix drip.  Iron deficiency anemia  Hemoglobin 8.1 on arrival.now 6.6 at this time she denies hematochezia or melena as opposed to her recent hospitalization unclear what her baseline is after recent rectal bleed but she was discharged with 8.1 from [facility].  Previous appeared to be between 9 and 10.  No current evidence of blood loss.  Continue to monitor.  Occult negative  She is status post 2 units of PRBC transfusion and 2/5-hemoglobin improved appropriately to 8.7 discussed with GI after evaluation they are not planning to scope with a EGD or colonoscopy.  As there is no active bleeding suspect this could be dilutional also she has liver cirrhosis could be nutritional aspect with iron deficiency as well.    Fibromyalgia  History of chronic, diffuse pain. Will continue home regimen.  Chronic,  continuous use of opioids  Continue home pain regimen.oxycodone prn   Class 3 severe obesity due to excess calories in adult (HCC)  Body mass index is 49.99 kg/m².  Recommend incorporating a more whole foods plant-predominant diet along with decreasing consumption of red meats and processed foods  Per AHA guidelines, recommend moderate-vigorous intensity exercise for 30 minutes a day for 5 days a week or a total of 150 min/week   Primary hypertension  Blood pressures are stable on midodrine midodrine has been started to allow diuresis Coreg for now has been discontinued and will be held  Pancytopenia (HCC)  Secondary to liver cirrhosis platelets improved will restart heparin and monitor    VTE Pharmacologic Prophylaxis: VTE Score: 4 Moderate Risk (Score 3-4) - Pharmacological DVT Prophylaxis Ordered: heparin.    Mobility:   Basic Mobility Inpatient Raw Score: 23  JH-HLM Goal: 7: Walk 25 feet or more  JH-HLM Achieved: 7: Walk 25 feet or more  JH-HLM Goal achieved. Continue to encourage appropriate mobility.    Patient Centered Rounds: I performed bedside rounds with nursing staff today.   Discussions with Specialists or Other Care Team Provider: discussed with gi     Education and Discussions with Family / Patient: Patient declined call to .     Current Length of Stay: 3 day(s)  Current Patient Status: Inpatient   Certification Statement: The patient will continue to require additional inpatient hospital stay due to decompensated liver cirrhosis   Discharge Plan: Anticipate discharge in >72 hrs to home.    Code Status: Level 1 - Full Code    Subjective   Seen and examined in past 2 days only one bm. No sob some abdominal soreness     Objective :  Temp:  [97.8 °F (36.6 °C)-98.2 °F (36.8 °C)] 97.8 °F (36.6 °C)  HR:  [84-94] 89  BP: (108-136)/(58-75) 120/65  Resp:  [16-18] 18  SpO2:  [92 %-100 %] 100 %  O2 Device: None (Room air)    Body mass index is 49.99 kg/m².     Input and Output Summary (last 24  hours):     Intake/Output Summary (Last 24 hours) at 2/6/2025 1119  Last data filed at 2/6/2025 0837  Gross per 24 hour   Intake 2534.58 ml   Output 600 ml   Net 1934.58 ml       Physical Exam  Vitals and nursing note reviewed.   Constitutional:       General: She is not in acute distress.     Appearance: She is well-developed.   HENT:      Head: Normocephalic and atraumatic.   Eyes:      Conjunctiva/sclera: Conjunctivae normal.   Cardiovascular:      Rate and Rhythm: Normal rate and regular rhythm.      Heart sounds: No murmur heard.  Pulmonary:      Effort: Pulmonary effort is normal. No respiratory distress.      Breath sounds: Rales present.   Abdominal:      General: There is distension.      Palpations: Abdomen is soft.      Tenderness: There is no abdominal tenderness.   Musculoskeletal:         General: Swelling present.      Cervical back: Neck supple.   Skin:     General: Skin is warm and dry.      Capillary Refill: Capillary refill takes less than 2 seconds.   Neurological:      Mental Status: She is alert and oriented to person, place, and time.   Psychiatric:         Mood and Affect: Mood normal.           Lines/Drains:              Lab Results: I have reviewed the following results:   Results from last 7 days   Lab Units 02/06/25  0448   WBC Thousand/uL 2.58*   HEMOGLOBIN g/dL 8.7*   HEMATOCRIT % 28.2*   PLATELETS Thousands/uL 70*   SEGS PCT % 56   LYMPHO PCT % 33   MONO PCT % 7   EOS PCT % 2     Results from last 7 days   Lab Units 02/06/25  0448   SODIUM mmol/L 139   POTASSIUM mmol/L 4.4   CHLORIDE mmol/L 100   CO2 mmol/L 38*   BUN mg/dL 11   CREATININE mg/dL 1.00   ANION GAP mmol/L 1*   CALCIUM mg/dL 8.2*   ALBUMIN g/dL 2.6*   TOTAL BILIRUBIN mg/dL 3.31*   ALK PHOS U/L 210*   ALT U/L 23   AST U/L 39   GLUCOSE RANDOM mg/dL 101                       Recent Cultures (last 7 days):         Imaging Results Review: I reviewed radiology reports from this admission including: Ultrasound(s).  Other Study  Results Review: No additional pertinent studies reviewed.    Last 24 Hours Medication List:     Current Facility-Administered Medications:     acetaminophen (TYLENOL) tablet 488 mg, Q4H PRN    albumin human (FLEXBUMIN) 25 % injection 25 g, Once    amitriptyline (ELAVIL) tablet 75 mg, HS    cyclobenzaprine (FLEXERIL) tablet 10 mg, TID PRN    dapsone tablet 50 mg, Daily    folic acid (FOLVITE) tablet 1 mg, Daily    furosemide (LASIX) 500 mg infusion 50 mL, Continuous, Last Rate: 12 mg/hr (02/06/25 0950)    heparin (porcine) subcutaneous injection 5,000 Units, Q8H MIGUEL    lactulose (CHRONULAC) oral solution 30 g, 5x Daily    lidocaine (LIDODERM) 5 % patch 1 patch, Daily    midodrine (PROAMATINE) tablet 10 mg, TID AC    nystatin (MYCOSTATIN) powder, BID    oxyCODONE (ROXICODONE) immediate release tablet 10 mg, Q6H PRN    polyethylene glycol (MIRALAX) packet 17 g, Daily PRN    potassium chloride (Klor-Con M20) CR tablet 40 mEq, BID    rifaximin (XIFAXAN) tablet 550 mg, Q12H MIGUEL    sertraline (ZOLOFT) tablet 100 mg, Daily    trimethobenzamide (TIGAN) IM injection 200 mg, Q6H PRN    ursodiol (ACTIGALL) capsule 300 mg, BID    Administrative Statements   Today, Patient Was Seen By: Maryjane Whatley MD  I have spent a total time of >35 minutes in caring for this patient on the day of the visit/encounter including Documenting in the medical record, Reviewing / ordering tests, medicine, procedures  , and Communicating with other healthcare professionals .    **Please Note: This note may have been constructed using a voice recognition system.**

## 2025-02-06 NOTE — ASSESSMENT & PLAN NOTE
History of HYATT cirrhosis.  Recent EGD without evidence of varices  Currently on rifaximin, lactulose, carvedilol, and torsemide. States she has not been taking spironolactone.   - Of note: patient did not sound confident in endorsing adherence to her diuretic regimen.  Decompensated with anasarca  Ultrasound shows trace ascites  No response to lasix 60 mg iv bid , dc start lasix drip at 12 mg/hr - k stable continue kcl 40 meq po bid and bmp q12  Daily weight   I and   Ammonia 77 no encephalopathy so will not trend   Only one bm for 2 consecutive days increase lactulose to 30 g 5 times a day I will need her to have at least 2-3 BMs  She states she gained 30 lb and is usually about 250-260 lb

## 2025-02-06 NOTE — ASSESSMENT & PLAN NOTE
Blood pressures are stable on midodrine midodrine has been started to allow diuresis Coreg for now has been discontinued and will be held

## 2025-02-06 NOTE — OCCUPATIONAL THERAPY NOTE
Occupational Therapy Evaluation & Treatment     Patient Name: Bailey Olsen  Today's Date: 2/6/2025  Problem List  Principal Problem:    Liver cirrhosis secondary to HYATT (nonalcoholic steatohepatitis) (HCC)  Active Problems:    Class 3 severe obesity due to excess calories in adult (HCC)    Primary hypertension    Hypokalemia    Fibromyalgia    Iron deficiency anemia    Pancytopenia (HCC)    Chronic, continuous use of opioids    Past Medical History  Past Medical History:   Diagnosis Date    Back pain     Carcinoid tumor     neuroendocrine    CHF (congestive heart failure) (HCC)     Cirrhosis of liver (HCC)     Sarcoidosis      Past Surgical History  Past Surgical History:   Procedure Laterality Date    CHOLECYSTECTOMY      GASTRIC BYPASS      HERNIA REPAIR      IR KYPHOPLASTY/VERTEBROPLASTY  10/15/2024           02/06/25 1225   OT Last Visit   OT Visit Date 02/06/25   Note Type   Note type Evaluation  (& Treatment)   Pain Assessment   Pain Assessment Tool 0-10   Pain Score 6   Pain Location/Orientation Location: Abdomen;Location: Back;Other (Comment)  (headache)   Hospital Pain Intervention(s) Repositioned;Ambulation/increased activity   Restrictions/Precautions   Weight Bearing Precautions Per Order No   Other Precautions Chair Alarm;Bed Alarm;Fall Risk;Pain;Multiple lines;Fluid restriction  (IV)   Home Living   Type of Home House   Home Layout Two level;Performs ADLs on one level;Able to live on main level with bedroom/bathroom  (1 FABRCIIO)   Bathroom Shower/Tub Tub/shower unit   Bathroom Toilet Raised   Bathroom Equipment Grab bars in shower;Commode  (BSC out in another room/trys to get to the bathroom)   Home Equipment Walker;Cane;Other (Comment)  (elevated HOB at home)   Additional Comments Pt resides with spouse and daughter in a 2SH with 1 [employer]. FFSU.   Prior Function   Level of [place] Needs assistance with ADLs;Needs assistance with functional mobility;Needs assistance with IADLS   Lives With  Daughter;Spouse  (daughter/lives upstairs in loft)   Receives Help From Family   IADLs Family/Friend/Other provides transportation;Family/Friend/Other provides meals;Family/Friend/Other provides medication management  (family drives)   Falls in the last 6 months 1 to 4  (x1 OOB)   Vocational Unemployed   Comments PTA, Pt reports requiring A with ADLs from spouse/daughter (LB dressing/showering/occasionally UB dressing). Pt with supportive spouse who completes IADLs/provides transportation. Pt also reports she takes uber. Pt completes functional mobility with RW. Pt reports her spouse works FT and her daughter attends school (+ alone at times)   Lifestyle   Autonomy A with ADLs/IADLs/Mod I with RW   Reciprocal Relationships Supportive spouse/daughter   Service to Others Unemployed   Intrinsic Gratification Enjoys making bracelets   ADL   Where Assessed Edge of bed   Eating Assistance 7  Independent   Grooming Assistance 5  Supervision/Setup   UB Bathing Assistance 5  Supervision/Setup   LB Bathing Assistance 3  Moderate Assistance   UB Dressing Assistance 5  Supervision/Setup   LB Dressing Assistance 3  Moderate Assistance   Toileting Assistance  5  Supervision/Setup   Toileting Deficit Setup;Supervison/safety;Increased time to complete;Grab bar use  (std toilet)   Functional Assistance 5  Supervision/Setup   Functional Deficit Increased time to complete;Supervision/safety;Setup   Bed Mobility   Supine to Sit 6  Modified independent   Additional items HOB elevated   Sit to Supine 5  Supervision   Additional items Bedrails   Additional Comments Pt greeted supine in bed.   Transfers   Sit to Stand 5  Supervision   Stand to Sit 5  Supervision   Stand pivot 5  Supervision   Toilet transfer 5  Supervision   Additional items   (GB)   Additional Comments with RW   Functional Mobility   Functional Mobility 5  Supervision   Additional Comments S with functional mobility with RW- community distances.   Additional items Rolling  walker   Balance   Static Sitting Normal   Dynamic Sitting Fair +   Static Standing Fair   Dynamic Standing Fair -   Ambulatory Fair -   Activity Tolerance   Activity Tolerance Patient tolerated treatment well   Medical Staff Made Aware Co-eval with RIKKI Frederick 2* to Pt's medical complexity and decreased endurance.   Nurse Made Aware RN cleared/updated.   RUE Assessment   RUE Assessment WFL   LUE Assessment   LUE Assessment WFL   Hand Function   Gross Motor Coordination Functional   Fine Motor Coordination Functional   Sensation   Light Touch No apparent deficits   Psychosocial   Psychosocial (WDL) WDL   Cognition   Overall Cognitive Status WFL   Arousal/Participation Alert;Cooperative   Attention Within functional limits   Orientation Level Oriented X4   Memory Within functional limits   Following Commands Follows all commands and directions without difficulty   Comments Pt pleasant and motivated to participate   Assessment   Limitation Decreased ADL status;Decreased UE ROM;Decreased UE strength;Decreased Safe judgement during ADL;Decreased endurance;Decreased high-level ADLs;Decreased self-care trans   Prognosis Fair   Assessment Pt is a 54 y.o. female who presented to [facility] on 2/3/2025 with 30lb weight gain in the past 2 weeks (sent to hospital by OP cardiologist). Pt with diagnosis of liver cirrhosis 2* to HYATT. Pt  has a past medical history of Back pain, Carcinoid tumor, CHF (congestive heart failure) (HCC), Cirrhosis of liver (HCC), and Sarcoidosis. Pt greeted bedside for OT evaluation on 02/06/25. Pt resides with spouse and daughter in a 2SH with [address] [place] [address]. PTA, Pt reports requiring A with ADLs from spouse/daughter (LB dressing/showering/occasionally UB dressing). Pt with supportive spouse who completes IADLs/provides transportation. Pt also reports she takes uber. Pt completes functional mobility with RW. Pt reports her spouse works FT and her daughter attends school (+ alone at times). Pt demonstrating the  following occupational performance levels: Supervision with UB ADLs, Mod A  with LB ADLs, Supervision with bed mobility, Supervision with functional transfers, and Supervision with functional mobility with RW. Limitations that impact functional performance include decreased ADL status, UE ROM, UE strength, safe judgement during ADLs, endurance, self care transfers, and high level ADLs. Occupational performance areas to address ADL retraining, functional transfer training, UE strengthening/ROM, endurance training, Pt/caregiver education, equipment evaluation/education, compensatory technique education, energy conservation, and activity engagement . Pt would benefit from continued skilled OT services while in hospital to maximize independence with ADLs. Will continue to follow Pt's progress. Pt would benefit from returning home with increased social support upon DC to maximize safety and independence with ADLs and functional tasks of choice.   Goals   Patient Goals To lose her water weight   LTG Time Frame 10-14   Long Term Goal #1 See goals listed below   Plan   Treatment Interventions ADL retraining;Functional transfer training;UE strengthening/ROM;Endurance training;Cognitive reorientation;Equipment evaluation/education;Compensatory technique education;Activityengagement;Energy conservation   Goal Expiration Date 02/20/25   OT Frequency 1-2x/wk   Discharge Recommendation   Rehab Resource Intensity Level, OT No post-acute rehabilitation needs   Additional Comments  The patient's raw score on the AM-PAC Daily Activity Inpatient Short Form is 18. A raw score of less than 19 suggests the patient may benefit from discharge to post-acute rehabilitation services. Please refer to the recommendation of the Occupational Therapist for safe discharge planning.   AM-PAC Daily Activity Inpatient   Lower Body Dressing 2   Bathing 3   Toileting 3   Upper Body Dressing 3   Grooming 3   Eating 4   Daily Activity Raw Score 18   Daily  Activity Standardized Score (Calc for Raw Score >=11) 38.66   AM-PAC Applied Cognition Inpatient   Following a Speech/Presentation 4   Understanding Ordinary Conversation 4   Taking Medications 4   Remembering Where Things Are Placed or Put Away 4   Remembering List of 4-5 Errands 4   Taking Care of Complicated Tasks 4   Applied Cognition Raw Score 24   Applied Cognition Standardized Score 62.21   Additional Treatment Session   Start Time 1251   End Time 1316   Treatment Assessment Pt greeted for follow up treatment focusing on increasing independence with ADLs. Pt engages in sponge bathing standing sinkside: S with UB bathing/dressing, and min A with LB bathing (assist with feet). Pt demonstrates standing tolerance of x10 min and completes functional mobility at S with RW to EOB. Pt completes LB dressing at mod A level sitting on EOB. Pt returned supine in bed at S level.   Additional Treatment Day 1   End of Consult   Education Provided Yes   Patient Position at End of Consult All needs within reach;Supine;Bed/Chair alarm activated  (declined OOB to chair this session despite encouragement)   Nurse Communication Nurse aware of consult     GOALS:  Pt will complete UB ADLs with I in order to maximize participation with ADLs.   Pt will complete LB ADLs with I in order to maximize safety with ADLs.   Pt will complete toileting routine (transfer, hygiene, and clothing management) with I in order to return to prior level of function.  Pt will complete bed mobility with I in order to maximize participation with ADLs.   Pt will complete functional transfers at I level in order to increase participation with ADLs.  Pt will increase dynamic standing balance to F+ in order to increase safety with ADLs.  Pt will increase standing tolerance x10 min in order to increase participation with ADLs.   Pt will complete functional mobility with AD PRN for item retrieval task at [place] level in order to increase participation with  ADLs.  Pt will complete IADL tasks/simulation of IADLs tasks with I in order to return to PLOF.  Pt will demonstrate G energy conservation techniques with ADLs/IADLs in order to reduce the risk of falls.  Pt will be attentive 100% of the time for ongoing functional/formal cognitive assessment to assist with safe dc planning prn.    Hailee Lord MS, OTR/L

## 2025-02-06 NOTE — PLAN OF CARE
Problem: OCCUPATIONAL THERAPY ADULT  Goal: Performs self-care activities at highest level of function for planned discharge setting.  See evaluation for individualized goals.  Description: Treatment Interventions: ADL retraining, Functional transfer training, UE strengthening/ROM, Endurance training, Cognitive reorientation, Equipment evaluation/education, Compensatory technique education, Activityengagement, Energy conservation          See flowsheet documentation for full assessment, interventions and recommendations.   Note: Limitation: Decreased ADL status, Decreased UE ROM, Decreased UE strength, Decreased Safe judgement during ADL, Decreased endurance, Decreased high-level ADLs, Decreased self-care trans  Prognosis: Fair  Assessment: Pt is a 54 y.o. female who presented to [facility] on 2/3/2025 with 30lb weight gain in the past 2 weeks (sent to hospital by OP cardiologist). Pt with diagnosis of liver cirrhosis 2* to HYATT. Pt  has a past medical history of Back pain, Carcinoid tumor, CHF (congestive heart failure) (HCC), Cirrhosis of liver (HCC), and Sarcoidosis. Pt greeted bedside for OT evaluation on 02/06/25. Pt resides with spouse and daughter in a [facility] with [facility]. PTA, Pt reports requiring A with ADLs from spouse/daughter (LB dressing/showering/occasionally UB dressing). Pt with supportive spouse who completes IADLs/provides transportation. Pt also reports she takes uber. Pt completes functional mobility with RW. Pt reports her spouse works FT and her daughter attends school (+ alone at times). Pt demonstrating the following occupational performance levels: Supervision with UB ADLs, Mod A  with LB ADLs, Supervision with bed mobility, Supervision with functional transfers, and Supervision with functional mobility with RW. Limitations that impact functional performance include decreased ADL status, UE ROM, UE strength, safe judgement during ADLs, endurance, self care transfers, and high level ADLs.  Occupational performance areas to address ADL retraining, functional transfer training, UE strengthening/ROM, endurance training, Pt/caregiver education, equipment evaluation/education, compensatory technique education, energy conservation, and activity engagement . Pt would benefit from continued skilled OT services while in hospital to maximize independence with ADLs. Will continue to follow Pt's progress. Pt would benefit from returning home with increased social support upon DC to maximize safety and independence with ADLs and functional tasks of choice.     Rehab Resource Intensity Level, OT: No post-acute rehabilitation needs

## 2025-02-06 NOTE — CASE MANAGEMENT
Case Management Discharge Planning Note    Patient name Bailey Olsen  Location /-01 MRN [id number]  : 1970 Date 2025       Current Admission Date: 2/3/2025  Current Admission Diagnosis:Liver cirrhosis secondary to HYATT (nonalcoholic steatohepatitis) (HCC)   Patient Active Problem List    Diagnosis Date Noted Date Diagnosed    Upper GI bleed 2025     Acute anemia 2025     Rectal bleeding 2025     Acute blood loss anemia 2025     Chronic, continuous use of opioids 2025     Acute metabolic encephalopathy 2025     SIRS (systemic inflammatory response syndrome) ([facility]) 2025     Influenza B 2024     Sacral fracture, closed ([facility]) 11/15/2024     KISHORE (acute kidney injury) ([facility]) 10/04/2024     Morbid obesity with BMI of 40.0-44.9, adult ([facility]) 10/04/2024     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) 2024     Fall from standing 2024     Closed compression fracture of L1 lumbar vertebra, sequela 2024     Closed stable burst fracture of first lumbar vertebra with routine healing 2024     Groin hematoma 2024     Ambulatory dysfunction 2024     Intractable back pain 2024     Hyperammonemia (HCC) 2024     Hepatic encephalopathy (HCC) 2024     Fall 2024     Liver cirrhosis secondary to HYATT (nonalcoholic steatohepatitis) (HCC) 2024     History of malignant carcinoid tumor of small intestine 2024     Class 3 severe obesity due to excess calories in adult (HCC) 2024     Primary hypertension 2024     Hypokalemia 2024     Fibromyalgia 2024     Iron deficiency anemia 2024     Pancytopenia (HCC) 2024     Sarcoidosis 2024     Bilateral pulmonary infiltrates 2024       LOS (days): 3  Geometric Mean LOS (GMLOS) (days): 3.2  Days to GMLOS:0.4     OBJECTIVE:  Risk of Unplanned Readmission Score: 65.44         Current  admission status: Inpatient   Preferred Pharmacy:   [id number] - [place] - [address]  [address] [address]  Phone: [phone] Fax: [phone]    [id number] [place] [address]  [address] [address]  Phone: [phone] Fax: [phone]    Primary Care Provider: Nayely Brown DO    Primary Insurance: BLUE CROSS  Secondary Insurance: MEDICARE    DISCHARGE DETAILS:    Discharge planning discussed with:: patient  Freedom of Choice: Yes  Comments - Freedom of Choice: care team recommendations for HHC discussed, pt requesting [facility] for SN, PT/OT services upon discharge     Were Treatment Team discharge recommendations reviewed with patient/caregiver?: Yes  Did patient/caregiver verbalize understanding of patient care needs?: Yes  Were patient/caregiver advised of the risks associated with not following Treatment Team discharge recommendations?: Yes         Requested Home Health Care         Is the patient interested in HHC at discharge?: Yes  Home Health Discipline requested:: Nursing, Occupational Therapy, Physical Therapy  Home Health Agency Name:: [facility]  HHA External Referral Reason (only applicable if external HHA name selected): Patient has established relationship with provider  Home Health Follow-Up Provider:: PCP  Home Health Services Needed:: Evaluate Functional Status and Safety, Gait/ADL Training, Heart Failure Management, Strengthening/Theraputic Exercises to Improve Function  Homebound Criteria Met:: Uses an Assist Device (i.e. cane, walker, etc)  Supporting Clincal Findings:: Limited Endurance         Other Referral/Resources/Interventions Provided:  Interventions: HHC         Treatment Team Recommendation: Home with Home Health Care  Discharge Destination Plan:: Home with Home Health Care  Transport at Discharge : Family

## 2025-02-06 NOTE — ASSESSMENT & PLAN NOTE
Body mass index is 49.99 kg/m².  Recommend incorporating a more whole foods plant-predominant diet along with decreasing consumption of red meats and processed foods  Per AHA guidelines, recommend moderate-vigorous intensity exercise for 30 minutes a day for 5 days a week or a total of 150 min/week

## 2025-02-06 NOTE — PLAN OF CARE
Problem: PAIN - ADULT  Goal: Verbalizes/displays adequate comfort level or baseline comfort level  Description: Interventions:  - Encourage patient to monitor pain and request assistance  - Assess pain using appropriate pain scale  - Administer analgesics based on type and severity of pain and evaluate response  - Implement non-pharmacological measures as appropriate and evaluate response  - Consider cultural and social influences on pain and pain management  - Notify physician/advanced practitioner if interventions unsuccessful or patient reports new pain  Outcome: Progressing     Problem: INFECTION - ADULT  Goal: Absence or prevention of progression during hospitalization  Description: INTERVENTIONS:  - Assess and monitor for signs and symptoms of infection  - Monitor lab/diagnostic results  - Monitor all insertion sites, i.e. indwelling lines, tubes, and drains  - Monitor endotracheal if appropriate and nasal secretions for changes in amount and color  - [place] appropriate cooling/warming therapies per order  - Administer medications as ordered  - Instruct and encourage patient and family to use good hand hygiene technique  - Identify and instruct in appropriate isolation precautions for identified infection/condition  Outcome: Progressing  Goal: Absence of fever/infection during neutropenic period  Description: INTERVENTIONS:  - Monitor WBC    Outcome: Progressing     Problem: SAFETY ADULT  Goal: Patient will remain free of falls  Description: INTERVENTIONS:  - Educate patient/family on patient safety including physical limitations  - Instruct patient to call for assistance with activity   - Consult OT/PT to assist with strengthening/mobility   - Keep Call bell within reach  - Keep bed low and locked with side rails adjusted as appropriate  - Keep care items and personal belongings within reach  - Initiate and maintain comfort rounds  - Make Fall Risk Sign visible to staff  - Offer Toileting every    Hours,  in advance of need  - Initiate/Maintain   alarm  - Obtain necessary fall risk management equipment:     - Apply yellow socks and bracelet for high fall risk patients  - Consider moving patient to room near nurses station  Outcome: Progressing  Goal: Maintain or return to baseline ADL function  Description: INTERVENTIONS:  -  Assess patient's ability to carry out ADLs; assess patient's baseline for ADL function and identify physical deficits which impact ability to perform ADLs (bathing, care of mouth/teeth, toileting, grooming, dressing, etc.)  - Assess/evaluate cause of self-care deficits   - Assess range of motion  - Assess patient's mobility; develop plan if impaired  - Assess patient's need for assistive devices and provide as appropriate  - Encourage maximum independence but intervene and supervise when necessary  - Involve family in performance of ADLs  - Assess for home care needs following discharge   - Consider OT consult to assist with ADL evaluation and planning for discharge  - Provide patient education as appropriate  Outcome: Progressing  Goal: Maintains/Returns to pre admission functional level  Description: INTERVENTIONS:  - Perform AM-PAC 6 Click Basic Mobility/ Daily Activity assessment daily.  - Set and communicate daily mobility goal to care team and patient/family/caregiver.   - Collaborate with rehabilitation services on mobility goals if consulted  - Perform Range of Motion    times a day.  - Reposition patient every    hours.  - Dangle patient    times a day  - Stand patient    times a day  - Ambulate patient    times a day  - Out of bed to chair    times a day   - Out of bed for meals        times a day  - Out of bed for toileting  - Record patient progress and toleration of activity level   Outcome: Progressing     Problem: DISCHARGE PLANNING  Goal: Discharge to home or other facility with appropriate resources  Description: INTERVENTIONS:  - Identify barriers to discharge w/patient and  caregiver  - Arrange for needed discharge resources and transportation as appropriate  - Identify discharge learning needs (meds, wound care, etc.)  - Arrange for interpretive services to assist at discharge as needed  - Refer to Case Management Department for coordinating discharge planning if the patient needs post-hospital services based on physician/advanced practitioner order or complex needs related to functional status, cognitive ability, or social support system  Outcome: Progressing     Problem: Knowledge Deficit  Goal: Patient/family/caregiver demonstrates understanding of disease process, treatment plan, medications, and discharge instructions  Description: Complete learning assessment and assess knowledge base.  Interventions:  - Provide teaching at level of understanding  - Provide teaching via preferred learning methods  Outcome: Progressing

## 2025-02-06 NOTE — PLAN OF CARE
Problem: PAIN - ADULT  Goal: Verbalizes/displays adequate comfort level or baseline comfort level  Description: Interventions:  - Encourage patient to monitor pain and request assistance  - Assess pain using appropriate pain scale  - Administer analgesics based on type and severity of pain and evaluate response  - Implement non-pharmacological measures as appropriate and evaluate response  - Consider cultural and social influences on pain and pain management  - Notify physician/advanced practitioner if interventions unsuccessful or patient reports new pain  Outcome: Progressing     Problem: INFECTION - ADULT  Goal: Absence or prevention of progression during hospitalization  Description: INTERVENTIONS:  - Assess and monitor for signs and symptoms of infection  - Monitor lab/diagnostic results  - Monitor all insertion sites, i.e. indwelling lines, tubes, and drains  - Monitor endotracheal if appropriate and nasal secretions for changes in amount and color  - [place] appropriate cooling/warming therapies per order  - Administer medications as ordered  - Instruct and encourage patient and family to use good hand hygiene technique  - Identify and instruct in appropriate isolation precautions for identified infection/condition  Outcome: Progressing  Goal: Absence of fever/infection during neutropenic period  Description: INTERVENTIONS:  - Monitor WBC    Outcome: Progressing     Problem: SAFETY ADULT  Goal: Patient will remain free of falls  Description: INTERVENTIONS:  - Educate patient/family on patient safety including physical limitations  - Instruct patient to call for assistance with activity   - Consult OT/PT to assist with strengthening/mobility   - Keep Call bell within reach  - Keep bed low and locked with side rails adjusted as appropriate  - Keep care items and personal belongings within reach  - Initiate and maintain comfort rounds  - Make Fall Risk Sign visible to staff  - Offer Toileting every 2 Hours,  in advance of need  - Initiate/Maintain bed alarm  - Obtain necessary fall risk management equipment  - Apply yellow socks and bracelet for high fall risk patients  - Consider moving patient to room near nurses station  Outcome: Progressing  Goal: Maintain or return to baseline ADL function  Description: INTERVENTIONS:  -  Assess patient's ability to carry out ADLs; assess patient's baseline for ADL function and identify physical deficits which impact ability to perform ADLs (bathing, care of mouth/teeth, toileting, grooming, dressing, etc.)  - Assess/evaluate cause of self-care deficits   - Assess range of motion  - Assess patient's mobility; develop plan if impaired  - Assess patient's need for assistive devices and provide as appropriate  - Encourage maximum independence but intervene and supervise when necessary  - Involve family in performance of ADLs  - Assess for home care needs following discharge   - Consider OT consult to assist with ADL evaluation and planning for discharge  - Provide patient education as appropriate  Outcome: Progressing  Goal: Maintains/Returns to pre admission functional level  Description: INTERVENTIONS:  - Perform AM-PAC 6 Click Basic Mobility/ Daily Activity assessment daily.  - Set and communicate daily mobility goal to care team and patient/family/caregiver.   - Collaborate with rehabilitation services on mobility goals if consulted  - Perform Range of Motion 3 times a day.  - Reposition patient every 2 hours.  - Dangle patient 3 times a day  - Stand patient 3 times a day  - Ambulate patient 3 times a day  - Out of bed to chair 3 times a day   - Out of bed for meals 3 times a day  - Out of bed for toileting  - Record patient progress and toleration of activity level   Outcome: Progressing     Problem: DISCHARGE PLANNING  Goal: Discharge to home or other facility with appropriate resources  Description: INTERVENTIONS:  - Identify barriers to discharge w/patient and caregiver  -  Arrange for needed discharge resources and transportation as appropriate  - Identify discharge learning needs (meds, wound care, etc.)  - Arrange for interpretive services to assist at discharge as needed  - Refer to Case Management Department for coordinating discharge planning if the patient needs post-hospital services based on physician/advanced practitioner order or complex needs related to functional status, cognitive ability, or social support system  Outcome: Progressing     Problem: Knowledge Deficit  Goal: Patient/family/caregiver demonstrates understanding of disease process, treatment plan, medications, and discharge instructions  Description: Complete learning assessment and assess knowledge base.  Interventions:  - Provide teaching at level of understanding  - Provide teaching via preferred learning methods  Outcome: Progressing

## 2025-02-06 NOTE — ASSESSMENT & PLAN NOTE
Hemoglobin 8.1 on arrival.now 6.6 at this time she denies hematochezia or melena as opposed to her recent hospitalization unclear what her baseline is after recent rectal bleed but she was discharged with 8.1 from [place].  Previous appeared to be between 9 and 10.  No current evidence of blood loss.  Continue to monitor.  Occult negative  She is status post 2 units of PRBC transfusion and 2/5-hemoglobin improved appropriately to 8.7 discussed with GI after evaluation they are not planning to scope with a EGD or colonoscopy.  As there is no active bleeding suspect this could be dilutional also she has liver cirrhosis could be nutritional aspect with iron deficiency as well.

## 2025-02-06 NOTE — PHYSICAL THERAPY NOTE
PHYSICAL THERAPY EVALUATION        NAME:  Bailey Olsen  DATE: 02/06/25    AGE:   54 y.o.  Mrn:   [id number]  ADMIT DX:  Edema [R60.9]  Hypokalemia [E87.6]  Anasarca [R60.1]    Past Medical History:   Diagnosis Date    Back pain     Carcinoid tumor     neuroendocrine    CHF (congestive heart failure) (HCC)     Cirrhosis of liver (HCC)     Sarcoidosis      Length Of Stay: 3  Performed at least 2 patient identifiers during session: Name and Birthday        PHYSICAL THERAPY EVALUATION:       02/06/25 1231   PT Last Visit   PT Visit Date 02/06/25   Note Type   Note type Evaluation   Pain Assessment   Pain Assessment Tool 0-10   Pain Score 6   Pain Location/Orientation Location: Abdomen;Location: Back;Location: Head   Restrictions/Precautions   Weight Bearing Precautions Per Order No   Other Precautions Chair Alarm;Bed Alarm;Fall Risk;Pain;Multiple lines   Home Living   Type of Home House   Home Layout Two level;Performs ADLs on one level;Able to live on main level with bedroom/bathroom  (1st flr set up, 1 FABRICIO)   Bathroom Shower/Tub Tub/shower unit   Bathroom Toilet Raised   Bathroom Equipment Grab bars in shower;Commode   Home Equipment Walker;Cane   Prior Function   Level of [place] Needs assistance with ADLs;Needs assistance with IADLS;Independent with functional mobility   Lives With Spouse;Daughter   Receives Help From Family   IADLs Family/Friend/Other provides transportation;Family/Friend/Other provides meals;Family/Friend/Other provides medication management   Falls in the last 6 months 1 to 4   Comments Mod (I) with use of RW   General   Additional Pertinent History States she no longer needs to wear her TLSO.   Family/Caregiver Present No   Cognition   Overall Cognitive Status WFL   Arousal/Participation Cooperative   Orientation Level Oriented X4   RLE Assessment   RLE Assessment WFL   LLE Assessment   LLE Assessment WFL   Bed Mobility   Supine to Sit 6  Modified independent   Additional items HOB  elevated   Transfers   Sit to Stand 5  Supervision   Stand to Sit 5  Supervision   Stand pivot 5  Supervision   Ambulation/Elevation   Gait Assistance 5  Supervision   Assistive Device Rolling walker   Distance 300 ft   Balance   Static Sitting Normal   Dynamic Sitting Fair +   Static Standing Fair +   Dynamic Standing Fair   Ambulatory Fair +  (RW)   Activity Tolerance   Activity Tolerance Patient tolerated treatment well   Assessment   Prognosis Good   Problem List Decreased strength;Impaired balance;Decreased mobility;Obesity;Pain   Barriers to Discharge None   Goals   STG Expiration Date 02/20/25   Plan   Treatment/Interventions Functional transfer training;LE strengthening/ROM;Elevations;Therapeutic exercise;Patient/family training;Gait training;Equipment eval/education;OT   PT Frequency 1-2x/wk   Discharge Recommendation   Rehab Resource Intensity Level, PT III (Minimum Resource Intensity)   Additional Comments using RW (pt owns)   AM-PAC Basic Mobility Inpatient   Turning in Flat Bed Without Bedrails 4   Lying on Back to Sitting on Edge of Flat Bed Without Bedrails 4   Moving Bed to Chair 3   Standing Up From Chair Using Arms 3   Walk in Room 3   Climb 3-5 Stairs With Railing 3   Basic Mobility Inpatient Raw Score 20   Basic Mobility Standardized Score 43.99   [facility] Highest Level Of Mobility   -HLM Goal 6: Walk 10 steps or more   JH-HLM Achieved 8: Walk 250 feet ot more   End of Consult   Patient Position at End of Consult   (in bathroom with OT)     (Please find full objective findings from PT assessment regarding body systems outlined above).     Assessment: Pt is a 54 y.o. female seen for PT evaluation s/p admission to [facility] on 2/3/2025 with Liver cirrhosis secondary to HYATT (nonalcoholic steatohepatitis) (HCC).  Order placed for PT services.  Upon evaluation: Pt is presenting with impaired functional mobility due to decreased strength, impaired balance, and fall risk  requiring  SPV assistance for transfers and ambulation with RW . Pt's clinical presentation is currently evolving given the functional mobility deficits above, coupled with fall risks as indicated by AM-PAC 6-Clicks: 20/24 as well as hx of falls and obesity and combined with medical complications of abnormal H&H, abnormal WBCs, abnormal CBC, abnormal CO2 values, and need for input for mobility technique/safety.  Pt's PMHx and comorbidities that may affect physical performance and progress include: anemia, Fibromyalgia, HTN, obesity, and liver cirrhosis . Personal factors affecting pt at time of IE include: questionable non-compliance, inability to perform IADLs, and recent fall(s)/fall history. Pt will benefit from continued skilled PT services to address deficits as defined above and to maximize level of functional mobility to facilitate return toward PLOF and improved QOL. From PT/mobility standpoint, recommendation at time of d/c would be Level III (Minimum Resource Intensity), with family and/or caregiver support, and with walker pending progress in order to reduce fall risk and maximize pt's functional independence and consistency with mobility in order to facilitate return to PLOF.  Recommend trial with walker next 1-2 sessions.     The patient's AM-PAC Basic Mobility Inpatient Short Form Raw Score is 20. A Raw score of greater than 16 suggests the patient may benefit from discharge to home. Please also refer to the recommendation of the Physical Therapist for safe discharge planning.       Goals:  Pt will perform transfers with modified I to increase Indep in home environment and prepare for ambulation. Pt will ambulate with RW for >/= 250 ft with  modified I  to decrease risk for falls and to access home environment. Pt will complete >/= 2 steps with with unilateral handrail with modified I to return to home with FABRICIO.          Otoniel Lafleur, PT,DPT

## 2025-02-07 LAB
ANION GAP SERPL CALCULATED.3IONS-SCNC: 4 MMOL/L (ref 4–13)
BASOPHILS # BLD AUTO: 0.03 THOUSANDS/ÂΜL (ref 0–0.1)
BASOPHILS NFR BLD AUTO: 1 % (ref 0–1)
BUN SERPL-MCNC: 9 MG/DL (ref 5–25)
CALCIUM SERPL-MCNC: 7.7 MG/DL (ref 8.4–10.2)
CHLORIDE SERPL-SCNC: 96 MMOL/L (ref 96–108)
CO2 SERPL-SCNC: 39 MMOL/L (ref 21–32)
CREAT SERPL-MCNC: 0.98 MG/DL (ref 0.6–1.3)
EOSINOPHIL # BLD AUTO: 0.06 THOUSAND/ÂΜL (ref 0–0.61)
EOSINOPHIL NFR BLD AUTO: 3 % (ref 0–6)
ERYTHROCYTE [DISTWIDTH] IN BLOOD BY AUTOMATED COUNT: 17.7 % (ref 11.6–15.1)
GFR SERPL CREATININE-BSD FRML MDRD: 65 ML/MIN/1.73SQ M
GLUCOSE SERPL-MCNC: 106 MG/DL (ref 65–140)
HCT VFR BLD AUTO: 28.2 % (ref 34.8–46.1)
HEMOCCULT STL QL: NEGATIVE
HEMOCCULT STL QL: NEGATIVE
HGB BLD-MCNC: 8.7 G/DL (ref 11.5–15.4)
IMM GRANULOCYTES # BLD AUTO: 0 THOUSAND/UL (ref 0–0.2)
IMM GRANULOCYTES NFR BLD AUTO: 0 % (ref 0–2)
LYMPHOCYTES # BLD AUTO: 0.63 THOUSANDS/ÂΜL (ref 0.6–4.47)
LYMPHOCYTES NFR BLD AUTO: 30 % (ref 14–44)
MAGNESIUM SERPL-MCNC: 1.5 MG/DL (ref 1.9–2.7)
MCH RBC QN AUTO: 30.2 PG (ref 26.8–34.3)
MCHC RBC AUTO-ENTMCNC: 30.9 G/DL (ref 31.4–37.4)
MCV RBC AUTO: 98 FL (ref 82–98)
MONOCYTES # BLD AUTO: 0.12 THOUSAND/ÂΜL (ref 0.17–1.22)
MONOCYTES NFR BLD AUTO: 6 % (ref 4–12)
NEUTROPHILS # BLD AUTO: 1.24 THOUSANDS/ÂΜL (ref 1.85–7.62)
NEUTS SEG NFR BLD AUTO: 60 % (ref 43–75)
NRBC BLD AUTO-RTO: 0 /100 WBCS
PLATELET # BLD AUTO: 63 THOUSANDS/UL (ref 149–390)
PMV BLD AUTO: 10.3 FL (ref 8.9–12.7)
POTASSIUM SERPL-SCNC: 2.9 MMOL/L (ref 3.5–5.3)
POTASSIUM SERPL-SCNC: 3.5 MMOL/L (ref 3.5–5.3)
RBC # BLD AUTO: 2.88 MILLION/UL (ref 3.81–5.12)
SODIUM SERPL-SCNC: 139 MMOL/L (ref 135–147)
WBC # BLD AUTO: 2.08 THOUSAND/UL (ref 4.31–10.16)

## 2025-02-07 PROCEDURE — 83735 ASSAY OF MAGNESIUM: CPT | Performed by: FAMILY MEDICINE

## 2025-02-07 PROCEDURE — 80048 BASIC METABOLIC PNL TOTAL CA: CPT | Performed by: FAMILY MEDICINE

## 2025-02-07 PROCEDURE — 99232 SBSQ HOSP IP/OBS MODERATE 35: CPT | Performed by: FAMILY MEDICINE

## 2025-02-07 PROCEDURE — 85025 COMPLETE CBC W/AUTO DIFF WBC: CPT | Performed by: FAMILY MEDICINE

## 2025-02-07 PROCEDURE — 84132 ASSAY OF SERUM POTASSIUM: CPT | Performed by: FAMILY MEDICINE

## 2025-02-07 RX ORDER — POTASSIUM CHLORIDE 1500 MG/1
40 TABLET, EXTENDED RELEASE ORAL ONCE
Status: COMPLETED | OUTPATIENT
Start: 2025-02-07 | End: 2025-02-07

## 2025-02-07 RX ORDER — MAGNESIUM SULFATE HEPTAHYDRATE 40 MG/ML
2 INJECTION, SOLUTION INTRAVENOUS ONCE
Status: COMPLETED | OUTPATIENT
Start: 2025-02-07 | End: 2025-02-07

## 2025-02-07 RX ADMIN — POTASSIUM CHLORIDE 40 MEQ: 1500 TABLET, EXTENDED RELEASE ORAL at 17:05

## 2025-02-07 RX ADMIN — LACTULOSE 30 G: 20 SOLUTION ORAL at 06:10

## 2025-02-07 RX ADMIN — NYSTATIN: 100000 POWDER TOPICAL at 08:42

## 2025-02-07 RX ADMIN — RIFAXIMIN 550 MG: 550 TABLET ORAL at 08:41

## 2025-02-07 RX ADMIN — LACTULOSE 30 G: 20 SOLUTION ORAL at 13:21

## 2025-02-07 RX ADMIN — Medication 12 MG/HR: at 06:13

## 2025-02-07 RX ADMIN — MIDODRINE HYDROCHLORIDE 10 MG: 5 TABLET ORAL at 06:10

## 2025-02-07 RX ADMIN — MIDODRINE HYDROCHLORIDE 10 MG: 5 TABLET ORAL at 17:05

## 2025-02-07 RX ADMIN — POTASSIUM CHLORIDE 40 MEQ: 1500 TABLET, EXTENDED RELEASE ORAL at 10:18

## 2025-02-07 RX ADMIN — RIFAXIMIN 550 MG: 550 TABLET ORAL at 21:34

## 2025-02-07 RX ADMIN — OXYCODONE HYDROCHLORIDE 10 MG: 10 TABLET ORAL at 18:08

## 2025-02-07 RX ADMIN — HEPARIN SODIUM 5000 UNITS: 5000 INJECTION INTRAVENOUS; SUBCUTANEOUS at 06:10

## 2025-02-07 RX ADMIN — FOLIC ACID 1 MG: 1 TABLET ORAL at 08:41

## 2025-02-07 RX ADMIN — AMITRIPTYLINE HYDROCHLORIDE 75 MG: 25 TABLET, FILM COATED ORAL at 21:34

## 2025-02-07 RX ADMIN — MAGNESIUM SULFATE HEPTAHYDRATE 2 G: 40 INJECTION, SOLUTION INTRAVENOUS at 11:16

## 2025-02-07 RX ADMIN — URSODIOL 300 MG: 300 CAPSULE ORAL at 17:05

## 2025-02-07 RX ADMIN — URSODIOL 300 MG: 300 CAPSULE ORAL at 08:41

## 2025-02-07 RX ADMIN — CYCLOBENZAPRINE 10 MG: 10 TABLET, FILM COATED ORAL at 21:36

## 2025-02-07 RX ADMIN — DAPSONE 50 MG: 25 TABLET ORAL at 08:42

## 2025-02-07 RX ADMIN — LACTULOSE 30 G: 20 SOLUTION ORAL at 11:14

## 2025-02-07 RX ADMIN — LACTULOSE 30 G: 20 SOLUTION ORAL at 17:05

## 2025-02-07 RX ADMIN — POTASSIUM CHLORIDE 40 MEQ: 1500 TABLET, EXTENDED RELEASE ORAL at 08:41

## 2025-02-07 RX ADMIN — NYSTATIN: 100000 POWDER TOPICAL at 17:06

## 2025-02-07 RX ADMIN — MIDODRINE HYDROCHLORIDE 10 MG: 5 TABLET ORAL at 11:15

## 2025-02-07 RX ADMIN — CYCLOBENZAPRINE 10 MG: 10 TABLET, FILM COATED ORAL at 07:34

## 2025-02-07 RX ADMIN — OXYCODONE HYDROCHLORIDE 10 MG: 10 TABLET ORAL at 06:22

## 2025-02-07 RX ADMIN — SERTRALINE 100 MG: 100 TABLET, FILM COATED ORAL at 08:41

## 2025-02-07 NOTE — ASSESSMENT & PLAN NOTE
Secondary to liver cirrhosis platelets-dropped down below 70,000 again.  Discontinue heparin at this time

## 2025-02-07 NOTE — PROGRESS NOTES
Patient:    MRN:  [id number]    Aidin Request ID:  [id number]    Level of care reserved:  Home Health Agency    Partner Reserved:  [facility] [place] [address] [phone]    Clinical needs requested:    Geography searched:  [phone]    Start of Service:    Request sent:  12:23pm EST on 2/7/2025 by Gabriella Kinney    Partner reserved:  3:03pm EST on 2/7/2025 by Gabriella Kinney    Choice list shared:  2:10pm EST on 2/7/2025 by Gabriella Kinney

## 2025-02-07 NOTE — ASSESSMENT & PLAN NOTE
Body mass index is 48.57 kg/m².  Recommend incorporating a more whole foods plant-predominant diet along with decreasing consumption of red meats and processed foods  Per AHA guidelines, recommend moderate-vigorous intensity exercise for 30 minutes a day for 5 days a week or a total of 150 min/week

## 2025-02-07 NOTE — PROGRESS NOTES
Progress Note - Hospitalist   Name: Bailey Olsen 54 y.o. female I MRN: [id number]  Unit/Bed#: -01 I Date of Admission: 2/3/2025   Date of Service: 2/7/2025 I Hospital Day: 4    Assessment & Plan  Liver cirrhosis secondary to HYATT (nonalcoholic steatohepatitis) (HCC)  History of HYATT cirrhosis.  Recent EGD without evidence of varices  Currently on rifaximin, lactulose, carvedilol, and torsemide. States she has not been taking spironolactone.   - Of note: patient did not sound confident in endorsing adherence to her diuretic regimen.  Decompensated with anasarca  Ultrasound shows trace ascites  Continue Lasix at 12 mg/h after recheck of potassium at 2 patient is down 9 pounds since the Lasix drip her lung sounds are clear but she still overloaded.  Although I wonder the accuracy of the output is as well as as -1.2 L does not coincide with how much weight she lost , 300->291  Daily weight   I and   Ammonia 77 no encephalopathy so will not trend   On 30 g of lactulose 5 times a day today she had 2 bowel movements we will hold if she has 3 and then I just down if necessary  She states she gained 30 lb and is usually about 250-260 lb   Hypokalemia  Continue supplementation of KCl 40 mg p.o. twice daily will give an extra now repeated K at 2 PM and for now hold Lasix till recheck.  Supplement magnesium as well.  And continue BMPs every 12 hours.  Iron deficiency anemia  Hemoglobin 8.1 on arrival.now 6.6 at this time she denies hematochezia or melena as opposed to her recent hospitalization unclear what her baseline is after recent rectal bleed but she was discharged with 8.1 from [place].  Previous appeared to be between 9 and 10.  No current evidence of blood loss.  Continue to monitor.  Occult negative  She is status post 2 units of PRBC transfusion and 2/5-hemoglobin improved appropriately to 8.7 discussed with GI after evaluation they are not planning to scope with a EGD or colonoscopy.  As there is no active  bleeding suspect this could be dilutional also she has liver cirrhosis could be nutritional aspect with iron deficiency as well.    Fibromyalgia  History of chronic, diffuse pain. Will continue home regimen.  Chronic, continuous use of opioids  Continue home pain regimen.oxycodone prn   Class 3 severe obesity due to excess calories in adult (HCC)  Body mass index is 48.57 kg/m².  Recommend incorporating a more whole foods plant-predominant diet along with decreasing consumption of red meats and processed foods  Per AHA guidelines, recommend moderate-vigorous intensity exercise for 30 minutes a day for 5 days a week or a total of 150 min/week   Primary hypertension  Blood pressures are stable on midodrine midodrine has been started to allow diuresis Coreg for now has been discontinued and will be held  Pancytopenia (HCC)  Secondary to liver cirrhosis platelets-dropped down below 70,000 again.  Discontinue heparin at this time    VTE Pharmacologic Prophylaxis: VTE Score: 4 Moderate Risk (Score 3-4) - Pharmacological DVT Prophylaxis Contraindicated. Sequential Compression Devices Ordered.    Mobility:   Basic Mobility Inpatient Raw Score: 22  JH-HLM Goal: 7: Walk 25 feet or more  JH-HLM Achieved: 8: Walk 250 feet ot more  JH-HLM Goal achieved. Continue to encourage appropriate mobility.    Patient Centered Rounds: I performed bedside rounds with nursing staff today.   Discussions with Specialists or Other Care Team Provider: None    Education and Discussions with Family / Patient: Updated  (sister) via phone.    Current Length of Stay: 4 day(s)  Current Patient Status: Inpatient   Certification Statement: The patient will continue to require additional inpatient hospital stay due to decompensated liver cirrhosis  Discharge Plan: Anticipate discharge in >72 hrs to home.    Code Status: Level 1 - Full Code    Subjective   Patient seen and examined denies any shortness of breath, states today had 2 bowel  movements so far    Objective :  Temp:  [98.2 °F (36.8 °C)] 98.2 °F (36.8 °C)  HR:  [89-99] 89  BP: (104-138)/(55-74) 115/70  Resp:  [18-20] 18  SpO2:  [91 %-99 %] 98 %  O2 Device: None (Room air)    Body mass index is 48.57 kg/m².     Input and Output Summary (last 24 hours):     Intake/Output Summary (Last 24 hours) at 2/7/2025 1102  Last data filed at 2/7/2025 0924  Gross per 24 hour   Intake 148.28 ml   Output 1800 ml   Net -1651.72 ml       Physical Exam  Vitals and nursing note reviewed.   Constitutional:       General: She is not in acute distress.     Appearance: She is well-developed.   HENT:      Head: Normocephalic and atraumatic.   Eyes:      Conjunctiva/sclera: Conjunctivae normal.   Cardiovascular:      Rate and Rhythm: Normal rate and regular rhythm.      Heart sounds: No murmur heard.  Pulmonary:      Effort: Pulmonary effort is normal. No respiratory distress.      Breath sounds: Normal breath sounds. No wheezing or rales.   Abdominal:      General: There is no distension.      Palpations: Abdomen is soft.      Tenderness: There is no abdominal tenderness.   Musculoskeletal:         General: Swelling present.      Cervical back: Neck supple.   Skin:     General: Skin is warm and dry.      Capillary Refill: Capillary refill takes less than 2 seconds.   Neurological:      Mental Status: She is alert and oriented to person, place, and time.   Psychiatric:         Mood and Affect: Mood normal.           Lines/Drains:              Lab Results: I have reviewed the following results:   Results from last 7 days   Lab Units 02/07/25  0615   WBC Thousand/uL 2.08*   HEMOGLOBIN g/dL 8.7*   HEMATOCRIT % 28.2*   PLATELETS Thousands/uL 63*   SEGS PCT % 60   LYMPHO PCT % 30   MONO PCT % 6   EOS PCT % 3     Results from last 7 days   Lab Units 02/07/25  0806 02/06/25  2019 02/06/25  0448   SODIUM mmol/L 139   < > 139   POTASSIUM mmol/L 2.9*   < > 4.4   CHLORIDE mmol/L 96   < > 100   CO2 mmol/L 39*   < > 38*   BUN  mg/dL 9   < > 11   CREATININE mg/dL 0.98   < > 1.00   ANION GAP mmol/L 4   < > 1*   CALCIUM mg/dL 7.7*   < > 8.2*   ALBUMIN g/dL  --   --  2.6*   TOTAL BILIRUBIN mg/dL  --   --  3.31*   ALK PHOS U/L  --   --  210*   ALT U/L  --   --  23   AST U/L  --   --  39   GLUCOSE RANDOM mg/dL 106   < > 101    < > = values in this interval not displayed.                       Recent Cultures (last 7 days):         Imaging Results Review: No pertinent imaging studies reviewed.  Other Study Results Review: No additional pertinent studies reviewed.    Last 24 Hours Medication List:     Current Facility-Administered Medications:     acetaminophen (TYLENOL) tablet 488 mg, Q4H PRN    amitriptyline (ELAVIL) tablet 75 mg, HS    cyclobenzaprine (FLEXERIL) tablet 10 mg, TID PRN    dapsone tablet 50 mg, Daily    folic acid (FOLVITE) tablet 1 mg, Daily    furosemide (LASIX) 500 mg infusion 50 mL, Continuous, Last Rate: Stopped (02/07/25 1016)    lactulose (CHRONULAC) oral solution 30 g, 5x Daily    lidocaine (LIDODERM) 5 % patch 1 patch, Daily    magnesium sulfate 2 g/50 mL IVPB (premix) 2 g, Once    midodrine (PROAMATINE) tablet 10 mg, TID AC    nystatin (MYCOSTATIN) powder, BID    oxyCODONE (ROXICODONE) immediate release tablet 10 mg, Q6H PRN    polyethylene glycol (MIRALAX) packet 17 g, Daily PRN    potassium chloride (Klor-Con M20) CR tablet 40 mEq, BID    rifaximin (XIFAXAN) tablet 550 mg, Q12H MIGUEL    sertraline (ZOLOFT) tablet 100 mg, Daily    trimethobenzamide (TIGAN) IM injection 200 mg, Q6H PRN    ursodiol (ACTIGALL) capsule 300 mg, BID    Administrative Statements   Today, Patient Was Seen By: Maryjane Whatley MD  I have spent a total time of >35 minutes in caring for this patient on the day of the visit/encounter including Patient and family education, Documenting in the medical record, and Reviewing / ordering tests, medicine, procedures  .    **Please Note: This note may have been constructed using a voice recognition  system.**

## 2025-02-07 NOTE — ASSESSMENT & PLAN NOTE
Hemoglobin 8.1 on arrival.now 6.6 at this time she denies hematochezia or melena as opposed to her recent hospitalization unclear what her baseline is after recent rectal bleed but she was discharged with 8.1 from [place].  Previous appeared to be between 9 and 10.  No current evidence of blood loss.  Continue to monitor.  Occult negative  She is status post 2 units of PRBC transfusion and 2/5-hemoglobin improved appropriately to 8.7 discussed with GI after evaluation they are not planning to scope with a EGD or colonoscopy.  As there is no active bleeding suspect this could be dilutional also she has liver cirrhosis could be nutritional aspect with iron deficiency as well.

## 2025-02-07 NOTE — CASE MANAGEMENT
Case Management Discharge Planning Note    Patient name Bailey Olsen  Location /-01 MRN [id number]  : 1970 Date 2025       Current Admission Date: 2/3/2025  Current Admission Diagnosis:Liver cirrhosis secondary to HYATT (nonalcoholic steatohepatitis) (HCC)   Patient Active Problem List    Diagnosis Date Noted Date Diagnosed    Upper GI bleed 2025     Acute anemia 2025     Rectal bleeding 2025     Acute blood loss anemia 2025     Chronic, continuous use of opioids 2025     Acute metabolic encephalopathy 2025     SIRS (systemic inflammatory response syndrome) ([facility]) 2025     Influenza B 2024     Sacral fracture, closed ([facility]) 11/15/2024     KISHORE (acute kidney injury) ([facility]) 10/04/2024     Morbid obesity with BMI of 40.0-44.9, adult ([facility]) 10/04/2024     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) 2024     Fall from standing 2024     Closed compression fracture of L1 lumbar vertebra, sequela 2024     Closed stable burst fracture of first lumbar vertebra with routine healing 2024     Groin hematoma 2024     Ambulatory dysfunction 2024     Intractable back pain 2024     Hyperammonemia (HCC) 2024     Hepatic encephalopathy (HCC) 2024     Fall 2024     Liver cirrhosis secondary to HYATT (nonalcoholic steatohepatitis) (HCC) 2024     History of malignant carcinoid tumor of small intestine 2024     Class 3 severe obesity due to excess calories in adult (HCC) 2024     Primary hypertension 2024     Hypokalemia 2024     Fibromyalgia 2024     Iron deficiency anemia 2024     Pancytopenia (HCC) 2024     Sarcoidosis 2024     Bilateral pulmonary infiltrates 2024       LOS (days): 4  Geometric Mean LOS (GMLOS) (days): 3.2  Days to GMLOS:-0.6     OBJECTIVE:  Risk of Unplanned Readmission Score: 63.74         Current  admission status: Inpatient   Preferred Pharmacy:   [id number] - [place] - [address]  [address] [address]  Phone: [phone] Fax: [phone]    [id number] - [place] - [address]  [address] [address]  Phone: [phone] Fax: [phone]    Primary Care Provider: Nayely Brown DO    Primary Insurance: BLUE CROSS  Secondary Insurance: MEDICARE    DISCHARGE DETAILS:    Discharge planning discussed with:: patient  Freedom of Choice: Yes  Comments - [place] of Choice: [facility] unable to confirm pts insurance several times, discussed with pt she was agreeable to place a blanket referral in aidin for HHC. pt choice list of HHC agencies discussed, pt chose [facility] for SN, PT/OT upon discharge.     Were Treatment Team discharge recommendations reviewed with patient/caregiver?: Yes  Did patient/caregiver verbalize understanding of patient care needs?: Yes  Were patient/caregiver advised of the risks associated with not following Treatment Team discharge recommendations?: Yes                             Treatment Team Recommendation: Home with Home Health Care  Discharge Destination Plan:: Home with Home Health Care  Transport at Discharge : Family

## 2025-02-07 NOTE — ASSESSMENT & PLAN NOTE
Continue supplementation of KCl 40 mg p.o. twice daily will give an extra now repeated K at 2 PM and for now hold Lasix till recheck.  Supplement magnesium as well.  And continue BMPs every 12 hours.

## 2025-02-07 NOTE — ASSESSMENT & PLAN NOTE
History of HYATT cirrhosis.  Recent EGD without evidence of varices  Currently on rifaximin, lactulose, carvedilol, and torsemide. States she has not been taking spironolactone.   - Of note: patient did not sound confident in endorsing adherence to her diuretic regimen.  Decompensated with anasarca  Ultrasound shows trace ascites  Continue Lasix at 12 mg/h after recheck of potassium at 2 patient is down 9 pounds since the Lasix drip her lung sounds are clear but she still overloaded.  Although I wonder the accuracy of the output is as well as as -1.2 L does not coincide with how much weight she lost , 300->291  Daily weight   I and   Ammonia 77 no encephalopathy so will not trend   On 30 g of lactulose 5 times a day today she had 2 bowel movements we will hold if she has 3 and then I just down if necessary  She states she gained 30 lb and is usually about 250-260 lb

## 2025-02-07 NOTE — PLAN OF CARE
Problem: PAIN - ADULT  Goal: Verbalizes/displays adequate comfort level or baseline comfort level  Description: Interventions:  - Encourage patient to monitor pain and request assistance  - Assess pain using appropriate pain scale  - Administer analgesics based on type and severity of pain and evaluate response  - Implement non-pharmacological measures as appropriate and evaluate response  - Consider cultural and social influences on pain and pain management  - Notify physician/advanced practitioner if interventions unsuccessful or patient reports new pain  Outcome: Progressing     Problem: INFECTION - ADULT  Goal: Absence or prevention of progression during hospitalization  Description: INTERVENTIONS:  - Assess and monitor for signs and symptoms of infection  - Monitor lab/diagnostic results  - Monitor all insertion sites, i.e. indwelling lines, tubes, and drains  - Monitor endotracheal if appropriate and nasal secretions for changes in amount and color  - [place] appropriate cooling/warming therapies per order  - Administer medications as ordered  - Instruct and encourage patient and family to use good hand hygiene technique  - Identify and instruct in appropriate isolation precautions for identified infection/condition  Outcome: Progressing  Goal: Absence of fever/infection during neutropenic period  Description: INTERVENTIONS:  - Monitor WBC    Outcome: Progressing     Problem: SAFETY ADULT  Goal: Patient will remain free of falls  Description: INTERVENTIONS:  - Educate patient/family on patient safety including physical limitations  - Instruct patient to call for assistance with activity   - Consult OT/PT to assist with strengthening/mobility   - Keep Call bell within reach  - Keep bed low and locked with side rails adjusted as appropriate  - Keep care items and personal belongings within reach  - Initiate and maintain comfort rounds  - Make Fall Risk Sign visible to staff  - Offer Toileting every 2 Hours,  in advance of need  - Initiate/Maintain bed alarm  - Obtain necessary fall risk management equipment  - Apply yellow socks and bracelet for high fall risk patients  - Consider moving patient to room near nurses station  Outcome: Progressing  Goal: Maintain or return to baseline ADL function  Description: INTERVENTIONS:  -  Assess patient's ability to carry out ADLs; assess patient's baseline for ADL function and identify physical deficits which impact ability to perform ADLs (bathing, care of mouth/teeth, toileting, grooming, dressing, etc.)  - Assess/evaluate cause of self-care deficits   - Assess range of motion  - Assess patient's mobility; develop plan if impaired  - Assess patient's need for assistive devices and provide as appropriate  - Encourage maximum independence but intervene and supervise when necessary  - Involve family in performance of ADLs  - Assess for home care needs following discharge   - Consider OT consult to assist with ADL evaluation and planning for discharge  - Provide patient education as appropriate  Outcome: Progressing  Goal: Maintains/Returns to pre admission functional level  Description: INTERVENTIONS:  - Perform AM-PAC 6 Click Basic Mobility/ Daily Activity assessment daily.  - Set and communicate daily mobility goal to care team and patient/family/caregiver.   - Collaborate with rehabilitation services on mobility goals if consulted  - Perform Range of Motion 3 times a day.  - Reposition patient every 2 hours.  - Dangle patient 3 times a day  - Stand patient 3 times a day  - Ambulate patient 3 times a day  - Out of bed to chair 3 times a day   - Out of bed for meals 3 times a day  - Out of bed for toileting  - Record patient progress and toleration of activity level   Outcome: Progressing     Problem: DISCHARGE PLANNING  Goal: Discharge to home or other facility with appropriate resources  Description: INTERVENTIONS:  - Identify barriers to discharge w/patient and caregiver  -  Arrange for needed discharge resources and transportation as appropriate  - Identify discharge learning needs (meds, wound care, etc.)  - Arrange for interpretive services to assist at discharge as needed  - Refer to Case Management Department for coordinating discharge planning if the patient needs post-hospital services based on physician/advanced practitioner order or complex needs related to functional status, cognitive ability, or social support system  Outcome: Progressing     Problem: Knowledge Deficit  Goal: Patient/family/caregiver demonstrates understanding of disease process, treatment plan, medications, and discharge instructions  Description: Complete learning assessment and assess knowledge base.  Interventions:  - Provide teaching at level of understanding  - Provide teaching via preferred learning methods  Outcome: Progressing

## 2025-02-08 LAB
ANION GAP SERPL CALCULATED.3IONS-SCNC: 3 MMOL/L (ref 4–13)
ANION GAP SERPL CALCULATED.3IONS-SCNC: 5 MMOL/L (ref 4–13)
ANION GAP SERPL CALCULATED.3IONS-SCNC: 6 MMOL/L (ref 4–13)
BASOPHILS # BLD AUTO: 0.03 THOUSANDS/ÂΜL (ref 0–0.1)
BASOPHILS NFR BLD AUTO: 1 % (ref 0–1)
BUN SERPL-MCNC: 10 MG/DL (ref 5–25)
BUN SERPL-MCNC: 9 MG/DL (ref 5–25)
BUN SERPL-MCNC: 9 MG/DL (ref 5–25)
CALCIUM SERPL-MCNC: 7.9 MG/DL (ref 8.4–10.2)
CALCIUM SERPL-MCNC: 7.9 MG/DL (ref 8.4–10.2)
CALCIUM SERPL-MCNC: 8 MG/DL (ref 8.4–10.2)
CHLORIDE SERPL-SCNC: 96 MMOL/L (ref 96–108)
CHLORIDE SERPL-SCNC: 97 MMOL/L (ref 96–108)
CHLORIDE SERPL-SCNC: 98 MMOL/L (ref 96–108)
CO2 SERPL-SCNC: 35 MMOL/L (ref 21–32)
CO2 SERPL-SCNC: 35 MMOL/L (ref 21–32)
CO2 SERPL-SCNC: 40 MMOL/L (ref 21–32)
CREAT SERPL-MCNC: 0.87 MG/DL (ref 0.6–1.3)
CREAT SERPL-MCNC: 0.94 MG/DL (ref 0.6–1.3)
CREAT SERPL-MCNC: 1.07 MG/DL (ref 0.6–1.3)
EOSINOPHIL # BLD AUTO: 0.08 THOUSAND/ÂΜL (ref 0–0.61)
EOSINOPHIL NFR BLD AUTO: 3 % (ref 0–6)
ERYTHROCYTE [DISTWIDTH] IN BLOOD BY AUTOMATED COUNT: 17.6 % (ref 11.6–15.1)
GFR SERPL CREATININE-BSD FRML MDRD: 58 ML/MIN/1.73SQ M
GFR SERPL CREATININE-BSD FRML MDRD: 68 ML/MIN/1.73SQ M
GFR SERPL CREATININE-BSD FRML MDRD: 75 ML/MIN/1.73SQ M
GLUCOSE SERPL-MCNC: 103 MG/DL (ref 65–140)
GLUCOSE SERPL-MCNC: 113 MG/DL (ref 65–140)
GLUCOSE SERPL-MCNC: 91 MG/DL (ref 65–140)
HCT VFR BLD AUTO: 28.2 % (ref 34.8–46.1)
HEMOCCULT STL QL: NEGATIVE
HGB BLD-MCNC: 8.5 G/DL (ref 11.5–15.4)
IMM GRANULOCYTES # BLD AUTO: 0.01 THOUSAND/UL (ref 0–0.2)
IMM GRANULOCYTES NFR BLD AUTO: 0 % (ref 0–2)
LYMPHOCYTES # BLD AUTO: 0.73 THOUSANDS/ÂΜL (ref 0.6–4.47)
LYMPHOCYTES NFR BLD AUTO: 30 % (ref 14–44)
MAGNESIUM SERPL-MCNC: 1.6 MG/DL (ref 1.9–2.7)
MCH RBC QN AUTO: 30.1 PG (ref 26.8–34.3)
MCHC RBC AUTO-ENTMCNC: 30.1 G/DL (ref 31.4–37.4)
MCV RBC AUTO: 100 FL (ref 82–98)
MONOCYTES # BLD AUTO: 0.15 THOUSAND/ÂΜL (ref 0.17–1.22)
MONOCYTES NFR BLD AUTO: 6 % (ref 4–12)
NEUTROPHILS # BLD AUTO: 1.44 THOUSANDS/ÂΜL (ref 1.85–7.62)
NEUTS SEG NFR BLD AUTO: 60 % (ref 43–75)
NRBC BLD AUTO-RTO: 0 /100 WBCS
PLATELET # BLD AUTO: 52 THOUSANDS/UL (ref 149–390)
PMV BLD AUTO: 11.3 FL (ref 8.9–12.7)
POTASSIUM SERPL-SCNC: 3 MMOL/L (ref 3.5–5.3)
POTASSIUM SERPL-SCNC: 3.2 MMOL/L (ref 3.5–5.3)
POTASSIUM SERPL-SCNC: 3.3 MMOL/L (ref 3.5–5.3)
RBC # BLD AUTO: 2.82 MILLION/UL (ref 3.81–5.12)
SODIUM SERPL-SCNC: 138 MMOL/L (ref 135–147)
SODIUM SERPL-SCNC: 138 MMOL/L (ref 135–147)
SODIUM SERPL-SCNC: 139 MMOL/L (ref 135–147)
WBC # BLD AUTO: 2.44 THOUSAND/UL (ref 4.31–10.16)

## 2025-02-08 PROCEDURE — 99232 SBSQ HOSP IP/OBS MODERATE 35: CPT | Performed by: FAMILY MEDICINE

## 2025-02-08 PROCEDURE — 85025 COMPLETE CBC W/AUTO DIFF WBC: CPT | Performed by: FAMILY MEDICINE

## 2025-02-08 PROCEDURE — 83735 ASSAY OF MAGNESIUM: CPT | Performed by: FAMILY MEDICINE

## 2025-02-08 PROCEDURE — 80048 BASIC METABOLIC PNL TOTAL CA: CPT | Performed by: FAMILY MEDICINE

## 2025-02-08 PROCEDURE — 82272 OCCULT BLD FECES 1-3 TESTS: CPT | Performed by: FAMILY MEDICINE

## 2025-02-08 RX ORDER — POTASSIUM CHLORIDE 14.9 MG/ML
20 INJECTION INTRAVENOUS ONCE
Status: COMPLETED | OUTPATIENT
Start: 2025-02-08 | End: 2025-02-09

## 2025-02-08 RX ORDER — POTASSIUM CHLORIDE 1500 MG/1
40 TABLET, EXTENDED RELEASE ORAL ONCE
Status: COMPLETED | OUTPATIENT
Start: 2025-02-08 | End: 2025-02-08

## 2025-02-08 RX ORDER — MAGNESIUM SULFATE HEPTAHYDRATE 40 MG/ML
2 INJECTION, SOLUTION INTRAVENOUS
Status: COMPLETED | OUTPATIENT
Start: 2025-02-08 | End: 2025-02-08

## 2025-02-08 RX ADMIN — MIDODRINE HYDROCHLORIDE 10 MG: 5 TABLET ORAL at 11:32

## 2025-02-08 RX ADMIN — MIDODRINE HYDROCHLORIDE 10 MG: 5 TABLET ORAL at 06:10

## 2025-02-08 RX ADMIN — POTASSIUM CHLORIDE 20 MEQ: 200 INJECTION, SOLUTION INTRAVENOUS at 23:09

## 2025-02-08 RX ADMIN — LACTULOSE 30 G: 20 SOLUTION ORAL at 11:34

## 2025-02-08 RX ADMIN — DAPSONE 50 MG: 25 TABLET ORAL at 08:43

## 2025-02-08 RX ADMIN — RIFAXIMIN 550 MG: 550 TABLET ORAL at 20:32

## 2025-02-08 RX ADMIN — TRIMETHOBENZAMIDE HYDROCHLORIDE 200 MG: 100 INJECTION INTRAMUSCULAR at 16:13

## 2025-02-08 RX ADMIN — LACTULOSE 30 G: 20 SOLUTION ORAL at 14:00

## 2025-02-08 RX ADMIN — SERTRALINE 100 MG: 100 TABLET, FILM COATED ORAL at 08:43

## 2025-02-08 RX ADMIN — MIDODRINE HYDROCHLORIDE 10 MG: 5 TABLET ORAL at 17:05

## 2025-02-08 RX ADMIN — OXYCODONE HYDROCHLORIDE 10 MG: 10 TABLET ORAL at 20:32

## 2025-02-08 RX ADMIN — NYSTATIN: 100000 POWDER TOPICAL at 17:05

## 2025-02-08 RX ADMIN — FOLIC ACID 1 MG: 1 TABLET ORAL at 08:43

## 2025-02-08 RX ADMIN — OXYCODONE HYDROCHLORIDE 10 MG: 10 TABLET ORAL at 14:00

## 2025-02-08 RX ADMIN — RIFAXIMIN 550 MG: 550 TABLET ORAL at 08:43

## 2025-02-08 RX ADMIN — OXYCODONE HYDROCHLORIDE 10 MG: 10 TABLET ORAL at 03:18

## 2025-02-08 RX ADMIN — POTASSIUM CHLORIDE 40 MEQ: 1500 TABLET, EXTENDED RELEASE ORAL at 08:43

## 2025-02-08 RX ADMIN — MAGNESIUM SULFATE HEPTAHYDRATE 2 G: 40 INJECTION, SOLUTION INTRAVENOUS at 11:33

## 2025-02-08 RX ADMIN — Medication 12 MG/HR: at 03:57

## 2025-02-08 RX ADMIN — URSODIOL 300 MG: 300 CAPSULE ORAL at 08:43

## 2025-02-08 RX ADMIN — LACTULOSE 30 G: 20 SOLUTION ORAL at 06:10

## 2025-02-08 RX ADMIN — POTASSIUM CHLORIDE 40 MEQ: 1500 TABLET, EXTENDED RELEASE ORAL at 11:37

## 2025-02-08 RX ADMIN — CYCLOBENZAPRINE 10 MG: 10 TABLET, FILM COATED ORAL at 11:32

## 2025-02-08 RX ADMIN — AMITRIPTYLINE HYDROCHLORIDE 75 MG: 25 TABLET, FILM COATED ORAL at 21:10

## 2025-02-08 RX ADMIN — LACTULOSE 30 G: 20 SOLUTION ORAL at 17:05

## 2025-02-08 RX ADMIN — NYSTATIN: 100000 POWDER TOPICAL at 08:44

## 2025-02-08 RX ADMIN — POTASSIUM CHLORIDE 40 MEQ: 1500 TABLET, EXTENDED RELEASE ORAL at 17:05

## 2025-02-08 RX ADMIN — URSODIOL 300 MG: 300 CAPSULE ORAL at 17:05

## 2025-02-08 RX ADMIN — MAGNESIUM SULFATE HEPTAHYDRATE 2 G: 40 INJECTION, SOLUTION INTRAVENOUS at 08:52

## 2025-02-08 NOTE — ASSESSMENT & PLAN NOTE
History of HYATT cirrhosis.  Recent EGD without evidence of varices  Currently on rifaximin, lactulose, carvedilol, and torsemide. States she has not been taking spironolactone.   - Of note: patient did not sound confident in endorsing adherence to her diuretic regimen.  Decompensated with anasarca  Ultrasound shows trace ascites  Continue Lasix at 12 mg/h weight 300-291-290 but negative 4.1 liters in 24 hrs - lasix drip was held for couple of hours yesterday  - will continue if bicarb starts rising may need to be transitioned to iv bumex intermittent doses .  Continue KCl 40 mg p.o. twice daily and supplement extra now  Daily weight   I and   Ammonia 77 no encephalopathy so will not trend   On 30 g of lactulose 5 times a day today she had 3 bowel movements 2/7 we will hold if she has 3 and then I just down if necessary  She states she gained 30 lb and is usually about 250-260 lb

## 2025-02-08 NOTE — ASSESSMENT & PLAN NOTE
Body mass index is 48.33 kg/m².  Recommend incorporating a more whole foods plant-predominant diet along with decreasing consumption of red meats and processed foods  Per AHA guidelines, recommend moderate-vigorous intensity exercise for 30 minutes a day for 5 days a week or a total of 150 min/week

## 2025-02-08 NOTE — PLAN OF CARE
Problem: PAIN - ADULT  Goal: Verbalizes/displays adequate comfort level or baseline comfort level  Description: Interventions:  - Encourage patient to monitor pain and request assistance  - Assess pain using appropriate pain scale  - Administer analgesics based on type and severity of pain and evaluate response  - Implement non-pharmacological measures as appropriate and evaluate response  - Consider cultural and social influences on pain and pain management  - Notify physician/advanced practitioner if interventions unsuccessful or patient reports new pain  Outcome: Progressing     Problem: INFECTION - ADULT  Goal: Absence or prevention of progression during hospitalization  Description: INTERVENTIONS:  - Assess and monitor for signs and symptoms of infection  - Monitor lab/diagnostic results  - Monitor all insertion sites, i.e. indwelling lines, tubes, and drains  - Monitor endotracheal if appropriate and nasal secretions for changes in amount and color  - [place] appropriate cooling/warming therapies per order  - Administer medications as ordered  - Instruct and encourage patient and family to use good hand hygiene technique  - Identify and instruct in appropriate isolation precautions for identified infection/condition  Outcome: Progressing  Goal: Absence of fever/infection during neutropenic period  Description: INTERVENTIONS:  - Monitor WBC    Outcome: Progressing

## 2025-02-08 NOTE — ASSESSMENT & PLAN NOTE
Continue home pain regimen.oxycodone prn   Opioid dependence in the setting of chronic pain, POA, evidenced by outpatient script for oxycodone 10mg q6h prn and continued while IP

## 2025-02-08 NOTE — ASSESSMENT & PLAN NOTE
Hemoglobin 8.1 on arrival.now 6.6 at this time she denies hematochezia or melena as opposed to her recent hospitalization unclear what her baseline is after recent rectal bleed but she was discharged with 8.1 from [place].  Previous appeared to be between 9 and 10.  No current evidence of blood loss.  Continue to monitor.  Occult negative  She is status post 2 units of PRBC transfusion and 2/5-hemoglobin improved appropriately to 8.7now 8.5 discussed with GI after evaluation they are not planning to scope with a EGD or colonoscopy.  As there is no active bleeding suspect this could be dilutional also she has liver cirrhosis could be nutritional aspect with iron deficiency as well.

## 2025-02-08 NOTE — PROGRESS NOTES
Progress Note - Hospitalist   Name: Bailey Olsen 54 y.o. female I MRN: [id number]  Unit/Bed#: -01 I Date of Admission: 2/3/2025   Date of Service: 2/8/2025 I Hospital Day: 5    Assessment & Plan  Liver cirrhosis secondary to HYATT (nonalcoholic steatohepatitis) (HCC)  History of HYATT cirrhosis.  Recent EGD without evidence of varices  Currently on rifaximin, lactulose, carvedilol, and torsemide. States she has not been taking spironolactone.   - Of note: patient did not sound confident in endorsing adherence to her diuretic regimen.  Decompensated with anasarca  Ultrasound shows trace ascites  Continue Lasix at 12 mg/h weight [phone] but negative 4.1 liters in 24 hrs - lasix drip was held for couple of hours yesterday  - will continue if bicarb starts rising may need to be transitioned to iv bumex intermittent doses .  Continue KCl 40 mg p.o. twice daily and supplement extra now  Daily weight   I and   Ammonia 77 no encephalopathy so will not trend   On 30 g of lactulose 5 times a day today she had 3 bowel movements 2/7 we will hold if she has 3 and then I just down if necessary  She states she gained 30 lb and is usually about 250-260 lb   Hypokalemia  Continue KCl 40 mg p.o. twice daily and supplement extra additional 40 for now  Iron deficiency anemia  Hemoglobin 8.1 on arrival.now 6.6 at this time she denies hematochezia or melena as opposed to her recent hospitalization unclear what her baseline is after recent rectal bleed but she was discharged with 8.1 from [place].  Previous appeared to be between 9 and 10.  No current evidence of blood loss.  Continue to monitor.  Occult negative  She is status post 2 units of PRBC transfusion and 2/5-hemoglobin improved appropriately to 8.7now 8.5 discussed with GI after evaluation they are not planning to scope with a EGD or colonoscopy.  As there is no active bleeding suspect this could be dilutional also she has liver cirrhosis could be nutritional aspect  with iron deficiency as well.    Fibromyalgia  History of chronic, diffuse pain. Will continue home regimen.  Chronic, continuous use of opioids  Continue home pain regimen.oxycodone prn   Opioid dependence in the setting of chronic pain, POA, evidenced by outpatient script for oxycodone 10mg q6h prn and continued while IP   Class 3 severe obesity due to excess calories in adult (HCC)  Body mass index is 48.33 kg/m².  Recommend incorporating a more whole foods plant-predominant diet along with decreasing consumption of red meats and processed foods  Per AHA guidelines, recommend moderate-vigorous intensity exercise for 30 minutes a day for 5 days a week or a total of 150 min/week   Primary hypertension  Blood pressures are stable on midodrine midodrine has been started to allow diuresis Coreg for now has been discontinued and will be held  Pancytopenia (HCC)  Secondary to liver cirrhosis platelets-dropped down below 70,000 again.  Discontinued heparin at this time 2/7    VTE Pharmacologic Prophylaxis: VTE Score: 4 Moderate Risk (Score 3-4) - Pharmacological DVT Prophylaxis Contraindicated. Sequential Compression Devices Ordered.    Mobility:   Basic Mobility Inpatient Raw Score: 23  JH-HLM Goal: 7: Walk 25 feet or more  JH-HLM Achieved: 7: Walk 25 feet or more  JH-HLM Goal achieved. Continue to encourage appropriate mobility.    Patient Centered Rounds: I performed bedside rounds with nursing staff today.   Discussions with Specialists or Other Care Team Provider: none    Education and Discussions with Family / Patient: Updated  () at bedside.    Current Length of Stay: 5 day(s)  Current Patient Status: Inpatient   Certification Statement: The patient will continue to require additional inpatient hospital stay due to decompensated liver cirrhosis  Discharge Plan: Anticipate discharge in >72 hrs to home.    Code Status: Level 1 - Full Code    Subjective   Patient seen and examined no chest pain  or shortness of breath    Objective :  Temp:  [98.1 °F (36.7 °C)] 98.1 °F (36.7 °C)  HR:  [85-92] 85  BP: (104-118)/(58-67) 111/61  Resp:  [17-20] 17  SpO2:  [93 %-97 %] 97 %  O2 Device: None (Room air)    Body mass index is 48.33 kg/m².     Input and Output Summary (last 24 hours):     Intake/Output Summary (Last 24 hours) at 2/8/2025 1138  Last data filed at 2/8/2025 1129  Gross per 24 hour   Intake 1441.04 ml   Output 4700 ml   Net -3258.96 ml       Physical Exam  Vitals and nursing note reviewed.   Constitutional:       General: She is not in acute distress.     Appearance: She is well-developed.   HENT:      Head: Normocephalic and atraumatic.   Eyes:      Conjunctiva/sclera: Conjunctivae normal.   Cardiovascular:      Rate and Rhythm: Normal rate and regular rhythm.      Heart sounds: No murmur heard.  Pulmonary:      Effort: Pulmonary effort is normal. No respiratory distress.      Breath sounds: Normal breath sounds. No wheezing or rales.   Abdominal:      General: There is no distension.      Palpations: Abdomen is soft.      Tenderness: There is no abdominal tenderness.   Musculoskeletal:         General: Swelling present.      Cervical back: Neck supple.   Skin:     General: Skin is warm and dry.      Capillary Refill: Capillary refill takes less than 2 seconds.   Neurological:      Mental Status: She is alert and oriented to person, place, and time.   Psychiatric:         Mood and Affect: Mood normal.           Lines/Drains:              Lab Results: I have reviewed the following results:   Results from last 7 days   Lab Units 02/08/25  0556   WBC Thousand/uL 2.44*   HEMOGLOBIN g/dL 8.5*   HEMATOCRIT % 28.2*   PLATELETS Thousands/uL 52*   SEGS PCT % 60   LYMPHO PCT % 30   MONO PCT % 6   EOS PCT % 3     Results from last 7 days   Lab Units 02/08/25  0804 02/06/25  2019 02/06/25  0448   SODIUM mmol/L 139   < > 139   POTASSIUM mmol/L 3.2*   < > 4.4   CHLORIDE mmol/L 96   < > 100   CO2 mmol/L 40*   < > 38*    BUN mg/dL 9   < > 11   CREATININE mg/dL 0.94   < > 1.00   ANION GAP mmol/L 3*   < > 1*   CALCIUM mg/dL 8.0*   < > 8.2*   ALBUMIN g/dL  --   --  2.6*   TOTAL BILIRUBIN mg/dL  --   --  3.31*   ALK PHOS U/L  --   --  210*   ALT U/L  --   --  23   AST U/L  --   --  39   GLUCOSE RANDOM mg/dL 103   < > 101    < > = values in this interval not displayed.                       Recent Cultures (last 7 days):         Imaging Results Review: No pertinent imaging studies reviewed.  Other Study Results Review: No additional pertinent studies reviewed.    Last 24 Hours Medication List:     Current Facility-Administered Medications:     acetaminophen (TYLENOL) tablet 488 mg, Q4H PRN    amitriptyline (ELAVIL) tablet 75 mg, HS    cyclobenzaprine (FLEXERIL) tablet 10 mg, TID PRN    dapsone tablet 50 mg, Daily    folic acid (FOLVITE) tablet 1 mg, Daily    furosemide (LASIX) 500 mg infusion 50 mL, Continuous, Last Rate: 12 mg/hr (02/08/25 0357)    lactulose (CHRONULAC) oral solution 30 g, 5x Daily    lidocaine (LIDODERM) 5 % patch 1 patch, Daily    magnesium sulfate 2 g/50 mL IVPB (premix) 2 g, Q2H, Last Rate: 2 g (02/08/25 1133)    midodrine (PROAMATINE) tablet 10 mg, TID AC    nystatin (MYCOSTATIN) powder, BID    oxyCODONE (ROXICODONE) immediate release tablet 10 mg, Q6H PRN    polyethylene glycol (MIRALAX) packet 17 g, Daily PRN    potassium chloride (Klor-Con M20) CR tablet 40 mEq, BID    rifaximin (XIFAXAN) tablet 550 mg, Q12H MIGUEL    sertraline (ZOLOFT) tablet 100 mg, Daily    trimethobenzamide (TIGAN) IM injection 200 mg, Q6H PRN    ursodiol (ACTIGALL) capsule 300 mg, BID    Administrative Statements   Today, Patient Was Seen By: Maryjane Whatley MD  I have spent a total time of >35 minutes in caring for this patient on the day of the visit/encounter including Patient and family education, Documenting in the medical record, and Reviewing / ordering tests, medicine, procedures  .    **Please Note: This note may have been  constructed using a voice recognition system.**

## 2025-02-08 NOTE — ASSESSMENT & PLAN NOTE
Secondary to liver cirrhosis platelets-dropped down below 70,000 again.  Discontinued heparin at this time 2/7

## 2025-02-09 LAB
ANION GAP SERPL CALCULATED.3IONS-SCNC: 4 MMOL/L (ref 4–13)
ANION GAP SERPL CALCULATED.3IONS-SCNC: 5 MMOL/L (ref 4–13)
BASE EX.OXY STD BLDV CALC-SCNC: 62.4 % (ref 60–80)
BASE EXCESS BLDV CALC-SCNC: 6.9 MMOL/L
BASOPHILS # BLD AUTO: 0.03 THOUSANDS/ÂΜL (ref 0–0.1)
BASOPHILS NFR BLD AUTO: 1 % (ref 0–1)
BUN SERPL-MCNC: 10 MG/DL (ref 5–25)
BUN SERPL-MCNC: 9 MG/DL (ref 5–25)
CALCIUM SERPL-MCNC: 8.2 MG/DL (ref 8.4–10.2)
CALCIUM SERPL-MCNC: 8.4 MG/DL (ref 8.4–10.2)
CHLORIDE SERPL-SCNC: 95 MMOL/L (ref 96–108)
CHLORIDE SERPL-SCNC: 97 MMOL/L (ref 96–108)
CO2 SERPL-SCNC: 36 MMOL/L (ref 21–32)
CO2 SERPL-SCNC: 39 MMOL/L (ref 21–32)
CREAT SERPL-MCNC: 1.04 MG/DL (ref 0.6–1.3)
CREAT SERPL-MCNC: 1.06 MG/DL (ref 0.6–1.3)
EOSINOPHIL # BLD AUTO: 0.11 THOUSAND/ÂΜL (ref 0–0.61)
EOSINOPHIL NFR BLD AUTO: 4 % (ref 0–6)
ERYTHROCYTE [DISTWIDTH] IN BLOOD BY AUTOMATED COUNT: 17.9 % (ref 11.6–15.1)
GFR SERPL CREATININE-BSD FRML MDRD: 59 ML/MIN/1.73SQ M
GFR SERPL CREATININE-BSD FRML MDRD: 60 ML/MIN/1.73SQ M
GLUCOSE SERPL-MCNC: 109 MG/DL (ref 65–140)
GLUCOSE SERPL-MCNC: 189 MG/DL (ref 65–140)
HCO3 BLDV-SCNC: 32.5 MMOL/L (ref 24–30)
HCT VFR BLD AUTO: 29.9 % (ref 34.8–46.1)
HGB BLD-MCNC: 9.1 G/DL (ref 11.5–15.4)
IMM GRANULOCYTES # BLD AUTO: 0.01 THOUSAND/UL (ref 0–0.2)
IMM GRANULOCYTES NFR BLD AUTO: 0 % (ref 0–2)
LYMPHOCYTES # BLD AUTO: 0.65 THOUSANDS/ÂΜL (ref 0.6–4.47)
LYMPHOCYTES NFR BLD AUTO: 25 % (ref 14–44)
MAGNESIUM SERPL-MCNC: 1.9 MG/DL (ref 1.9–2.7)
MCH RBC QN AUTO: 30.3 PG (ref 26.8–34.3)
MCHC RBC AUTO-ENTMCNC: 30.4 G/DL (ref 31.4–37.4)
MCV RBC AUTO: 100 FL (ref 82–98)
MONOCYTES # BLD AUTO: 0.14 THOUSAND/ÂΜL (ref 0.17–1.22)
MONOCYTES NFR BLD AUTO: 5 % (ref 4–12)
NEUTROPHILS # BLD AUTO: 1.7 THOUSANDS/ÂΜL (ref 1.85–7.62)
NEUTS SEG NFR BLD AUTO: 65 % (ref 43–75)
NRBC BLD AUTO-RTO: 0 /100 WBCS
O2 CT BLDV-SCNC: 9.1 ML/DL
PCO2 BLDV: 51.3 MM HG (ref 42–50)
PH BLDV: 7.42 [PH] (ref 7.3–7.4)
PLATELET # BLD AUTO: 57 THOUSANDS/UL (ref 149–390)
PMV BLD AUTO: 11.4 FL (ref 8.9–12.7)
PO2 BLDV: 36 MM HG (ref 35–45)
POTASSIUM SERPL-SCNC: 3.1 MMOL/L (ref 3.5–5.3)
POTASSIUM SERPL-SCNC: 3.7 MMOL/L (ref 3.5–5.3)
RBC # BLD AUTO: 3 MILLION/UL (ref 3.81–5.12)
SODIUM SERPL-SCNC: 138 MMOL/L (ref 135–147)
SODIUM SERPL-SCNC: 138 MMOL/L (ref 135–147)
WBC # BLD AUTO: 2.64 THOUSAND/UL (ref 4.31–10.16)

## 2025-02-09 PROCEDURE — 83735 ASSAY OF MAGNESIUM: CPT | Performed by: FAMILY MEDICINE

## 2025-02-09 PROCEDURE — 82805 BLOOD GASES W/O2 SATURATION: CPT | Performed by: FAMILY MEDICINE

## 2025-02-09 PROCEDURE — 85025 COMPLETE CBC W/AUTO DIFF WBC: CPT | Performed by: FAMILY MEDICINE

## 2025-02-09 PROCEDURE — 80048 BASIC METABOLIC PNL TOTAL CA: CPT | Performed by: FAMILY MEDICINE

## 2025-02-09 PROCEDURE — 99232 SBSQ HOSP IP/OBS MODERATE 35: CPT | Performed by: FAMILY MEDICINE

## 2025-02-09 RX ORDER — ALBUMIN (HUMAN) 12.5 G/50ML
25 SOLUTION INTRAVENOUS EVERY 6 HOURS
Status: COMPLETED | OUTPATIENT
Start: 2025-02-09 | End: 2025-02-09

## 2025-02-09 RX ORDER — MIDODRINE HYDROCHLORIDE 5 MG/1
2.5 TABLET ORAL ONCE
Status: COMPLETED | OUTPATIENT
Start: 2025-02-09 | End: 2025-02-09

## 2025-02-09 RX ORDER — POTASSIUM CHLORIDE 1500 MG/1
40 TABLET, EXTENDED RELEASE ORAL
Status: DISCONTINUED | OUTPATIENT
Start: 2025-02-09 | End: 2025-02-13 | Stop reason: HOSPADM

## 2025-02-09 RX ORDER — MIDODRINE HYDROCHLORIDE 5 MG/1
12.5 TABLET ORAL
Status: DISCONTINUED | OUTPATIENT
Start: 2025-02-09 | End: 2025-02-13 | Stop reason: HOSPADM

## 2025-02-09 RX ORDER — LACTULOSE 10 G/15ML
30 SOLUTION ORAL 4 TIMES DAILY
Status: DISCONTINUED | OUTPATIENT
Start: 2025-02-09 | End: 2025-02-13 | Stop reason: HOSPADM

## 2025-02-09 RX ADMIN — POTASSIUM CHLORIDE 40 MEQ: 1500 TABLET, EXTENDED RELEASE ORAL at 15:47

## 2025-02-09 RX ADMIN — MIDODRINE HYDROCHLORIDE 12.5 MG: 5 TABLET ORAL at 11:56

## 2025-02-09 RX ADMIN — OXYCODONE HYDROCHLORIDE 10 MG: 10 TABLET ORAL at 21:10

## 2025-02-09 RX ADMIN — POTASSIUM CHLORIDE 40 MEQ: 1500 TABLET, EXTENDED RELEASE ORAL at 11:56

## 2025-02-09 RX ADMIN — URSODIOL 300 MG: 300 CAPSULE ORAL at 08:11

## 2025-02-09 RX ADMIN — CYCLOBENZAPRINE 10 MG: 10 TABLET, FILM COATED ORAL at 14:29

## 2025-02-09 RX ADMIN — POTASSIUM CHLORIDE 40 MEQ: 1500 TABLET, EXTENDED RELEASE ORAL at 08:11

## 2025-02-09 RX ADMIN — RIFAXIMIN 550 MG: 550 TABLET ORAL at 21:06

## 2025-02-09 RX ADMIN — DAPSONE 50 MG: 25 TABLET ORAL at 08:12

## 2025-02-09 RX ADMIN — MIDODRINE HYDROCHLORIDE 10 MG: 5 TABLET ORAL at 06:00

## 2025-02-09 RX ADMIN — RIFAXIMIN 550 MG: 550 TABLET ORAL at 08:11

## 2025-02-09 RX ADMIN — URSODIOL 300 MG: 300 CAPSULE ORAL at 17:16

## 2025-02-09 RX ADMIN — MIDODRINE HYDROCHLORIDE 2.5 MG: 5 TABLET ORAL at 08:11

## 2025-02-09 RX ADMIN — LACTULOSE 30 G: 20 SOLUTION ORAL at 21:06

## 2025-02-09 RX ADMIN — MIDODRINE HYDROCHLORIDE 12.5 MG: 5 TABLET ORAL at 15:47

## 2025-02-09 RX ADMIN — LACTULOSE 30 G: 20 SOLUTION ORAL at 11:56

## 2025-02-09 RX ADMIN — FOLIC ACID 1 MG: 1 TABLET ORAL at 08:11

## 2025-02-09 RX ADMIN — CYCLOBENZAPRINE 10 MG: 10 TABLET, FILM COATED ORAL at 05:51

## 2025-02-09 RX ADMIN — SERTRALINE 100 MG: 100 TABLET, FILM COATED ORAL at 08:11

## 2025-02-09 RX ADMIN — LACTULOSE 30 G: 20 SOLUTION ORAL at 17:14

## 2025-02-09 RX ADMIN — NYSTATIN: 100000 POWDER TOPICAL at 17:17

## 2025-02-09 RX ADMIN — LACTULOSE 30 G: 20 SOLUTION ORAL at 06:00

## 2025-02-09 RX ADMIN — OXYCODONE HYDROCHLORIDE 10 MG: 10 TABLET ORAL at 15:47

## 2025-02-09 RX ADMIN — AMITRIPTYLINE HYDROCHLORIDE 75 MG: 25 TABLET, FILM COATED ORAL at 21:06

## 2025-02-09 RX ADMIN — ALBUMIN (HUMAN) 25 G: 0.25 INJECTION, SOLUTION INTRAVENOUS at 15:48

## 2025-02-09 RX ADMIN — ALBUMIN (HUMAN) 25 G: 0.25 INJECTION, SOLUTION INTRAVENOUS at 10:34

## 2025-02-09 RX ADMIN — OXYCODONE HYDROCHLORIDE 10 MG: 10 TABLET ORAL at 08:11

## 2025-02-09 RX ADMIN — NYSTATIN: 100000 POWDER TOPICAL at 08:13

## 2025-02-09 RX ADMIN — Medication 12 MG/HR: at 19:18

## 2025-02-09 NOTE — ASSESSMENT & PLAN NOTE
History of HYATT cirrhosis.  Recent EGD without evidence of varices  Currently on rifaximin, lactulose, carvedilol, and torsemide. States she has not been taking spironolactone.   - Of note: patient did not sound confident in endorsing adherence to her diuretic regimen.  Decompensated with anasarca  Ultrasound shows trace ascites  Continue Lasix at 12 mg/h weight [phone] but negative 4.525 liters in 24 hrs - continue lasix drip as still with some rales and +3-4 pitting edema - increase kcl to 40 meq po tid bmp tonight.2 doses of albumin 25 % for low albumin. Cmp in am   Daily weight   I and   Ammonia 77 no encephalopathy so will not trend   Had 4 bm 2/7 decrease lactulose down to qid   She states she gained 30 lb and is usually about 250-260 lb   2decho- nml systolic and diastolic function done 10/2024  Midodrine for diureses increase to 12.5 mg po tid

## 2025-02-09 NOTE — ASSESSMENT & PLAN NOTE
Hemoglobin 8.1 on arrival.now 6.6 at this time she denies hematochezia or melena as opposed to her recent hospitalization unclear what her baseline is after recent rectal bleed but she was discharged with 8.1 from [place].  Previous appeared to be between 9 and 10.  No current evidence of blood loss.  Continue to monitor.  Occult negative  She is status post 2 units of PRBC transfusion and 2/5-hemoglobin improved to 9.1 now  discussed with GI after evaluation they are not planning to scope with a EGD or colonoscopy.  As there is no active bleeding suspect this could be dilutional also she has liver cirrhosis could be nutritional aspect with iron deficiency as well on initial arrival

## 2025-02-09 NOTE — PROGRESS NOTES
Progress Note - Hospitalist   Name: Bailey Olsen 54 y.o. female I MRN: [id number]  Unit/Bed#: -01 I Date of Admission: 2/3/2025   Date of Service: 2/9/2025 I Hospital Day: 6    Assessment & Plan  Liver cirrhosis secondary to HYATT (nonalcoholic steatohepatitis) (HCC)  History of HYATT cirrhosis.  Recent EGD without evidence of varices  Currently on rifaximin, lactulose, carvedilol, and torsemide. States she has not been taking spironolactone.   - Of note: patient did not sound confident in endorsing adherence to her diuretic regimen.  Decompensated with anasarca  Ultrasound shows trace ascites  Continue Lasix at 12 mg/h weight [phone] but negative 4.525 liters in 24 hrs - continue lasix drip as still with some rales and +3-4 pitting edema - increase kcl to 40 meq po tid bmp tonight.2 doses of albumin 25 % for low albumin. Cmp in am   Daily weight   I and   Ammonia 77 no encephalopathy so will not trend   Had 4 bm 2/7 decrease lactulose down to qid   She states she gained 30 lb and is usually about 250-260 lb   2decho- nml systolic and diastolic function done 10/2024  Midodrine for diureses increase to 12.5 mg po tid   Hypokalemia  Increase kcl to 40 meq po tid   Iron deficiency anemia  Hemoglobin 8.1 on arrival.now 6.6 at this time she denies hematochezia or melena as opposed to her recent hospitalization unclear what her baseline is after recent rectal bleed but she was discharged with 8.1 from [place].  Previous appeared to be between 9 and 10.  No current evidence of blood loss.  Continue to monitor.  Occult negative  She is status post 2 units of PRBC transfusion and 2/5-hemoglobin improved to 9.1 now  discussed with GI after evaluation they are not planning to scope with a EGD or colonoscopy.  As there is no active bleeding suspect this could be dilutional also she has liver cirrhosis could be nutritional aspect with iron deficiency as well on initial arrival     Fibromyalgia  History of  chronic, diffuse pain. Will continue home regimen.  Chronic, continuous use of opioids  Continue home pain regimen.oxycodone prn   Opioid dependence in the setting of chronic pain, POA, evidenced by outpatient script for oxycodone 10mg q6h prn and continued while IP   Class 3 severe obesity due to excess calories in adult (HCC)  Body mass index is 47.89 kg/m².  Recommend incorporating a more whole foods plant-predominant diet along with decreasing consumption of red meats and processed foods  Per AHA guidelines, recommend moderate-vigorous intensity exercise for 30 minutes a day for 5 days a week or a total of 150 min/week   Primary hypertension  Blood pressures are stable on midodrine midodrine has been started to allow diuresis Coreg for now has been discontinued and will be held  Pancytopenia (HCC)  Secondary to liver cirrhosis platelets-dropped down below 70,000 again.  Discontinued heparin at this time 2/7    VTE Pharmacologic Prophylaxis: VTE Score: 4 Moderate Risk (Score 3-4) - Pharmacological DVT Prophylaxis Contraindicated. Sequential Compression Devices Ordered.    Mobility:   Basic Mobility Inpatient Raw Score: 23  JH-HLM Goal: 7: Walk 25 feet or more  JH-HLM Achieved: 7: Walk 25 feet or more  JH-HLM Goal achieved. Continue to encourage appropriate mobility.    Patient Centered Rounds: I performed bedside rounds with nursing staff today.   Discussions with Specialists or Other Care Team Provider: none    Education and Discussions with Family / Patient: pt    Current Length of Stay: 6 day(s)  Current Patient Status: Inpatient   Certification Statement: The patient will continue to require additional inpatient hospital stay due to 2/2 decompensated liver cirrhosis  Discharge Plan: Anticipate discharge in >72 hrs to home.    Code Status: Level 1 - Full Code    Subjective   Patient seen and examined had 4 bowel movements yesterday.  No shortness of breath.    Objective :  Temp:  [98.1 °F (36.7 °C)-98.6 °F (37  °C)] 98.6 °F (37 °C)  HR:  [80-92] 80  BP: (100-128)/(48-78) 106/53  Resp:  [14-20] 18  SpO2:  [91 %-97 %] 92 %  O2 Device: None (Room air)    Body mass index is 47.89 kg/m².     Input and Output Summary (last 24 hours):     Intake/Output Summary (Last 24 hours) at 2/9/2025 1015  Last data filed at 2/9/2025 0902  Gross per 24 hour   Intake 1470 ml   Output 3625 ml   Net -2155 ml       Physical Exam  Vitals and nursing note reviewed.   Constitutional:       General: She is not in acute distress.     Appearance: She is well-developed.   HENT:      Head: Normocephalic and atraumatic.   Eyes:      Conjunctiva/sclera: Conjunctivae normal.   Cardiovascular:      Rate and Rhythm: Normal rate and regular rhythm.      Heart sounds: No murmur heard.  Pulmonary:      Effort: Pulmonary effort is normal. No respiratory distress.      Breath sounds: Rales (Mild scattered) present.   Abdominal:      General: There is no distension.      Palpations: Abdomen is soft.      Tenderness: There is no abdominal tenderness.   Musculoskeletal:         General: Swelling (+3+4) present.      Cervical back: Neck supple.   Skin:     General: Skin is warm and dry.      Capillary Refill: Capillary refill takes less than 2 seconds.   Neurological:      Mental Status: She is alert and oriented to person, place, and time.   Psychiatric:         Mood and Affect: Mood normal.           Lines/Drains:              Lab Results: I have reviewed the following results:   Results from last 7 days   Lab Units 02/09/25  0541   WBC Thousand/uL 2.64*   HEMOGLOBIN g/dL 9.1*   HEMATOCRIT % 29.9*   PLATELETS Thousands/uL 57*   SEGS PCT % 65   LYMPHO PCT % 25   MONO PCT % 5   EOS PCT % 4     Results from last 7 days   Lab Units 02/09/25  0806 02/06/25  2019 02/06/25  0448   SODIUM mmol/L 138   < > 139   POTASSIUM mmol/L 3.1*   < > 4.4   CHLORIDE mmol/L 95*   < > 100   CO2 mmol/L 39*   < > 38*   BUN mg/dL 9   < > 11   CREATININE mg/dL 1.04   < > 1.00   ANION GAP  mmol/L 4   < > 1*   CALCIUM mg/dL 8.2*   < > 8.2*   ALBUMIN g/dL  --   --  2.6*   TOTAL BILIRUBIN mg/dL  --   --  3.31*   ALK PHOS U/L  --   --  210*   ALT U/L  --   --  23   AST U/L  --   --  39   GLUCOSE RANDOM mg/dL 109   < > 101    < > = values in this interval not displayed.                       Recent Cultures (last 7 days):         Imaging Results Review: No pertinent imaging studies reviewed.  Other Study Results Review: No additional pertinent studies reviewed.    Last 24 Hours Medication List:     Current Facility-Administered Medications:     acetaminophen (TYLENOL) tablet 488 mg, Q4H PRN    albumin human (FLEXBUMIN) 25 % injection 25 g, Q6H    amitriptyline (ELAVIL) tablet 75 mg, HS    cyclobenzaprine (FLEXERIL) tablet 10 mg, TID PRN    dapsone tablet 50 mg, Daily    folic acid (FOLVITE) tablet 1 mg, Daily    furosemide (LASIX) 500 mg infusion 50 mL, Continuous, Last Rate: 12 mg/hr (02/08/25 0357)    lactulose (CHRONULAC) oral solution 30 g, 4x Daily    lidocaine (LIDODERM) 5 % patch 1 patch, Daily    midodrine (PROAMATINE) tablet 12.5 mg, TID AC    nystatin (MYCOSTATIN) powder, BID    oxyCODONE (ROXICODONE) immediate release tablet 10 mg, Q6H PRN    polyethylene glycol (MIRALAX) packet 17 g, Daily PRN    potassium chloride (Klor-Con M20) CR tablet 40 mEq, TID With Meals    rifaximin (XIFAXAN) tablet 550 mg, Q12H MIGUEL    sertraline (ZOLOFT) tablet 100 mg, Daily    trimethobenzamide (TIGAN) IM injection 200 mg, Q6H PRN    ursodiol (ACTIGALL) capsule 300 mg, BID    Administrative Statements   Today, Patient Was Seen By: Maryjane Whatley MD  I have spent a total time of >35 minutes in caring for this patient on the day of the visit/encounter including Documenting in the medical record and Reviewing / ordering tests, medicine, procedures  .    **Please Note: This note may have been constructed using a voice recognition system.**

## 2025-02-09 NOTE — PLAN OF CARE
Problem: PAIN - ADULT  Goal: Verbalizes/displays adequate comfort level or baseline comfort level  Description: Interventions:  - Encourage patient to monitor pain and request assistance  - Assess pain using appropriate pain scale  - Administer analgesics based on type and severity of pain and evaluate response  - Implement non-pharmacological measures as appropriate and evaluate response  - Consider cultural and social influences on pain and pain management  - Notify physician/advanced practitioner if interventions unsuccessful or patient reports new pain  Outcome: Progressing     Problem: INFECTION - ADULT  Goal: Absence or prevention of progression during hospitalization  Description: INTERVENTIONS:  - Assess and monitor for signs and symptoms of infection  - Monitor lab/diagnostic results  - Monitor all insertion sites, i.e. indwelling lines, tubes, and drains  - Monitor endotracheal if appropriate and nasal secretions for changes in amount and color  - [place] appropriate cooling/warming therapies per order  - Administer medications as ordered  - Instruct and encourage patient and family to use good hand hygiene technique  - Identify and instruct in appropriate isolation precautions for identified infection/condition  Outcome: Progressing  Goal: Absence of fever/infection during neutropenic period  Description: INTERVENTIONS:  - Monitor WBC    Outcome: Progressing

## 2025-02-09 NOTE — ASSESSMENT & PLAN NOTE
Body mass index is 47.89 kg/m².  Recommend incorporating a more whole foods plant-predominant diet along with decreasing consumption of red meats and processed foods  Per AHA guidelines, recommend moderate-vigorous intensity exercise for 30 minutes a day for 5 days a week or a total of 150 min/week

## 2025-02-10 LAB
ALBUMIN SERPL BCG-MCNC: 3.1 G/DL (ref 3.5–5)
ALP SERPL-CCNC: 202 U/L (ref 34–104)
ALT SERPL W P-5'-P-CCNC: 17 U/L (ref 7–52)
ANION GAP SERPL CALCULATED.3IONS-SCNC: 6 MMOL/L (ref 4–13)
AST SERPL W P-5'-P-CCNC: 43 U/L (ref 13–39)
BASOPHILS # BLD AUTO: 0.04 THOUSANDS/ÂΜL (ref 0–0.1)
BASOPHILS NFR BLD AUTO: 1 % (ref 0–1)
BILIRUB SERPL-MCNC: 2.19 MG/DL (ref 0.2–1)
BUN SERPL-MCNC: 10 MG/DL (ref 5–25)
CALCIUM ALBUM COR SERPL-MCNC: 8.9 MG/DL (ref 8.3–10.1)
CALCIUM SERPL-MCNC: 8.2 MG/DL (ref 8.4–10.2)
CHLORIDE SERPL-SCNC: 97 MMOL/L (ref 96–108)
CO2 SERPL-SCNC: 36 MMOL/L (ref 21–32)
CREAT SERPL-MCNC: 0.92 MG/DL (ref 0.6–1.3)
EOSINOPHIL # BLD AUTO: 0.15 THOUSAND/ÂΜL (ref 0–0.61)
EOSINOPHIL NFR BLD AUTO: 5 % (ref 0–6)
ERYTHROCYTE [DISTWIDTH] IN BLOOD BY AUTOMATED COUNT: 18.1 % (ref 11.6–15.1)
GFR SERPL CREATININE-BSD FRML MDRD: 70 ML/MIN/1.73SQ M
GLUCOSE SERPL-MCNC: 98 MG/DL (ref 65–140)
HCT VFR BLD AUTO: 30.5 % (ref 34.8–46.1)
HGB BLD-MCNC: 9.1 G/DL (ref 11.5–15.4)
IMM GRANULOCYTES # BLD AUTO: 0 THOUSAND/UL (ref 0–0.2)
IMM GRANULOCYTES NFR BLD AUTO: 0 % (ref 0–2)
LYMPHOCYTES # BLD AUTO: 0.78 THOUSANDS/ÂΜL (ref 0.6–4.47)
LYMPHOCYTES NFR BLD AUTO: 27 % (ref 14–44)
MAGNESIUM SERPL-MCNC: 1.7 MG/DL (ref 1.9–2.7)
MCH RBC QN AUTO: 30 PG (ref 26.8–34.3)
MCHC RBC AUTO-ENTMCNC: 29.8 G/DL (ref 31.4–37.4)
MCV RBC AUTO: 101 FL (ref 82–98)
MONOCYTES # BLD AUTO: 0.21 THOUSAND/ÂΜL (ref 0.17–1.22)
MONOCYTES NFR BLD AUTO: 7 % (ref 4–12)
NEUTROPHILS # BLD AUTO: 1.68 THOUSANDS/ÂΜL (ref 1.85–7.62)
NEUTS SEG NFR BLD AUTO: 60 % (ref 43–75)
NRBC BLD AUTO-RTO: 0 /100 WBCS
PLATELET # BLD AUTO: 55 THOUSANDS/UL (ref 149–390)
PMV BLD AUTO: 11 FL (ref 8.9–12.7)
POTASSIUM SERPL-SCNC: 3.5 MMOL/L (ref 3.5–5.3)
PROT SERPL-MCNC: 6.6 G/DL (ref 6.4–8.4)
RBC # BLD AUTO: 3.03 MILLION/UL (ref 3.81–5.12)
SODIUM SERPL-SCNC: 139 MMOL/L (ref 135–147)
WBC # BLD AUTO: 2.86 THOUSAND/UL (ref 4.31–10.16)

## 2025-02-10 PROCEDURE — 85025 COMPLETE CBC W/AUTO DIFF WBC: CPT | Performed by: FAMILY MEDICINE

## 2025-02-10 PROCEDURE — 99232 SBSQ HOSP IP/OBS MODERATE 35: CPT | Performed by: FAMILY MEDICINE

## 2025-02-10 PROCEDURE — 83735 ASSAY OF MAGNESIUM: CPT | Performed by: FAMILY MEDICINE

## 2025-02-10 PROCEDURE — 80053 COMPREHEN METABOLIC PANEL: CPT | Performed by: FAMILY MEDICINE

## 2025-02-10 RX ORDER — MAGNESIUM SULFATE HEPTAHYDRATE 40 MG/ML
2 INJECTION, SOLUTION INTRAVENOUS ONCE
Status: COMPLETED | OUTPATIENT
Start: 2025-02-10 | End: 2025-02-10

## 2025-02-10 RX ADMIN — POTASSIUM CHLORIDE 40 MEQ: 1500 TABLET, EXTENDED RELEASE ORAL at 08:12

## 2025-02-10 RX ADMIN — URSODIOL 300 MG: 300 CAPSULE ORAL at 19:01

## 2025-02-10 RX ADMIN — POTASSIUM CHLORIDE 40 MEQ: 1500 TABLET, EXTENDED RELEASE ORAL at 12:57

## 2025-02-10 RX ADMIN — OXYCODONE HYDROCHLORIDE 10 MG: 10 TABLET ORAL at 20:24

## 2025-02-10 RX ADMIN — URSODIOL 300 MG: 300 CAPSULE ORAL at 08:12

## 2025-02-10 RX ADMIN — LACTULOSE 30 G: 20 SOLUTION ORAL at 08:12

## 2025-02-10 RX ADMIN — LACTULOSE 30 G: 20 SOLUTION ORAL at 19:01

## 2025-02-10 RX ADMIN — RIFAXIMIN 550 MG: 550 TABLET ORAL at 08:12

## 2025-02-10 RX ADMIN — AMITRIPTYLINE HYDROCHLORIDE 75 MG: 25 TABLET, FILM COATED ORAL at 21:27

## 2025-02-10 RX ADMIN — LACTULOSE 30 G: 20 SOLUTION ORAL at 12:55

## 2025-02-10 RX ADMIN — POTASSIUM CHLORIDE 40 MEQ: 1500 TABLET, EXTENDED RELEASE ORAL at 19:01

## 2025-02-10 RX ADMIN — RIFAXIMIN 550 MG: 550 TABLET ORAL at 21:27

## 2025-02-10 RX ADMIN — MAGNESIUM SULFATE HEPTAHYDRATE 2 G: 40 INJECTION, SOLUTION INTRAVENOUS at 16:29

## 2025-02-10 RX ADMIN — CYCLOBENZAPRINE 10 MG: 10 TABLET, FILM COATED ORAL at 08:17

## 2025-02-10 RX ADMIN — SERTRALINE 100 MG: 100 TABLET, FILM COATED ORAL at 08:13

## 2025-02-10 RX ADMIN — NYSTATIN: 100000 POWDER TOPICAL at 19:02

## 2025-02-10 RX ADMIN — DAPSONE 50 MG: 25 TABLET ORAL at 08:13

## 2025-02-10 RX ADMIN — FOLIC ACID 1 MG: 1 TABLET ORAL at 08:13

## 2025-02-10 RX ADMIN — LACTULOSE 30 G: 20 SOLUTION ORAL at 21:27

## 2025-02-10 NOTE — ASSESSMENT & PLAN NOTE
History of HYATT cirrhosis.  Recent EGD without evidence of varices  Currently on rifaximin, lactulose, carvedilol, and torsemide. States she has not been taking spironolactone.   - Of note: patient did not sound confident in endorsing adherence to her diuretic regimen.  Decompensated with anasarca  Ultrasound shows trace ascites  Continue Lasix at 15 mg/h weight [phone]->287 but negative 4.525 liters in 24 hrs - continue lasix drip as still with some rales and +3-4 pitting edema - increase kcl to 40 meq po tid .2 doses of albumin 25 % for low albumin given yestreday. Cmp in am   Daily weight   Ammonia 77 no encephalopathy so will not trend   Had 4 bm 2/7 decrease lactulose down to qid   She states she gained 30 lb and is usually about 250-260 lb   2decho- nml systolic and diastolic function done 10/2024  Midodrine for diureses increase to 12.5 mg po tid

## 2025-02-10 NOTE — PLAN OF CARE
Problem: SAFETY ADULT  Goal: Patient will remain free of falls  Description: INTERVENTIONS:  - Educate patient/family on patient safety including physical limitations  - Instruct patient to call for assistance with activity   - Consult OT/PT to assist with strengthening/mobility   - Keep Call bell within reach  - Keep bed low and locked with side rails adjusted as appropriate  - Keep care items and personal belongings within reach  - Initiate and maintain comfort rounds  - Make Fall Risk Sign visible to staff    - Apply yellow socks and bracelet for high fall risk patients  - Consider moving patient to room near nurses station  Outcome: Progressing     Problem: Knowledge Deficit  Goal: Patient/family/caregiver demonstrates understanding of disease process, treatment plan, medications, and discharge instructions  Description: Complete learning assessment and assess knowledge base.  Interventions:  - Provide teaching at level of understanding  - Provide teaching via preferred learning methods  Outcome: Progressing

## 2025-02-10 NOTE — PROGRESS NOTES
Progress Note - Hospitalist   Name: Bailey Olsen 54 y.o. female I MRN: [id number]  Unit/Bed#: -01 I Date of Admission: 2/3/2025   Date of Service: 2/10/2025 I Hospital Day: 7    Assessment & Plan  Liver cirrhosis secondary to HYATT (nonalcoholic steatohepatitis) (HCC)  History of HYATT cirrhosis.  Recent EGD without evidence of varices  Currently on rifaximin, lactulose, carvedilol, and torsemide. States she has not been taking spironolactone.   - Of note: patient did not sound confident in endorsing adherence to her diuretic regimen.  Decompensated with anasarca  Ultrasound shows trace ascites  Continue Lasix at 15 mg/h weight [phone]->[phone] but negative 4.525 liters in 24 hrs - continue lasix drip as still with some rales and +3-4 pitting edema - increase kcl to 40 meq po tid .2 doses of albumin 25 % for low albumin given yestreday. Cmp in am   Daily weight   Ammonia 77 no encephalopathy so will not trend   Had 4 bm 2/7 decrease lactulose down to qid   She states she gained 30 lb and is usually about 250-260 lb   2decho- nml systolic and diastolic function done 10/2024  Midodrine for diureses increase to 12.5 mg po tid   Hypokalemia  Increase kcl to 40 meq po tid   Iron deficiency anemia  Hemoglobin 8.1 on arrival.now 6.6 at this time she denies hematochezia or melena as opposed to her recent hospitalization unclear what her baseline is after recent rectal bleed but she was discharged with 8.1 from [place].  Previous appeared to be between 9 and 10.  No current evidence of blood loss.  Continue to monitor.  Occult negative  She is status post 2 units of PRBC transfusion and 2/5-hemoglobin improved to 9.1 now  discussed with GI after evaluation they are not planning to scope with a EGD or colonoscopy.  As there is no active bleeding suspect this could be dilutional also she has liver cirrhosis could be nutritional aspect with iron deficiency as well on initial arrival     Fibromyalgia  History of chronic,  diffuse pain. Will continue home regimen.  Chronic, continuous use of opioids  Continue home pain regimen.oxycodone prn   Opioid dependence in the setting of chronic pain, POA, evidenced by outpatient script for oxycodone 10mg q6h prn and continued while IP   Class 3 severe obesity due to excess calories in adult (HCC)  Body mass index is 47.13 kg/m².  Recommend incorporating a more whole foods plant-predominant diet along with decreasing consumption of red meats and processed foods  Per AHA guidelines, recommend moderate-vigorous intensity exercise for 30 minutes a day for 5 days a week or a total of 150 min/week   Primary hypertension  Blood pressures are stable on midodrine . midodrine has been started to allow diuresis Coreg for now has been discontinued and will be held  Pancytopenia (HCC)  Secondary to liver cirrhosis platelets-dropped down below 70,000 again.  Discontinued heparin at this time 2/7    VTE Pharmacologic Prophylaxis: VTE Score: 4 Moderate Risk (Score 3-4) - Pharmacological DVT Prophylaxis Contraindicated. Sequential Compression Devices Ordered.    Mobility:   Basic Mobility Inpatient Raw Score: 23  JH-HLM Goal: 7: Walk 25 feet or more  JH-HLM Achieved: 7: Walk 25 feet or more  JH-HLM Goal achieved. Continue to encourage appropriate mobility.    Patient Centered Rounds: I performed bedside rounds with nursing staff today.   Discussions with Specialists or Other Care Team Provider: none    Education and Discussions with Family / Patient: will update family    Current Length of Stay: 7 day(s)  Current Patient Status: Inpatient   Certification Statement: The patient will continue to require additional inpatient hospital stay due to liver cirrhosis decompensated  Discharge Plan: Anticipate discharge in 24-48 hrs to home with home services.    Code Status: Level 1 - Full Code    Subjective   Denies any chest pain or shortness of breath feels her leg swelling and abdominal swelling is improving  .currently on a Lasix drip    Objective :  Temp:  [98.3 °F (36.8 °C)-98.5 °F (36.9 °C)] 98.5 °F (36.9 °C)  HR:  [83-93] 83  BP: (115-145)/(66-88) 121/67  Resp:  [18] 18  SpO2:  [94 %-97 %] 94 %    Body mass index is 47.13 kg/m².     Input and Output Summary (last 24 hours):     Intake/Output Summary (Last 24 hours) at 2/10/2025 1236  Last data filed at 2/10/2025 0849  Gross per 24 hour   Intake 840 ml   Output 1550 ml   Net -710 ml       Physical Exam  Vitals and nursing note reviewed.   Constitutional:       Appearance: Normal appearance.   HENT:      Head: Normocephalic and atraumatic.      Right Ear: External ear normal.      Left Ear: External ear normal.      Nose: Nose normal.      Mouth/Throat:      Pharynx: Oropharynx is clear.   Eyes:      Pupils: Pupils are equal, round, and reactive to light.   Cardiovascular:      Rate and Rhythm: Normal rate and regular rhythm.      Heart sounds: Normal heart sounds.   Pulmonary:      Effort: Pulmonary effort is normal.      Breath sounds: Normal breath sounds.   Abdominal:      General: Bowel sounds are normal. There is distension.      Palpations: Abdomen is soft.      Tenderness: There is no abdominal tenderness.   Musculoskeletal:         General: Normal range of motion.      Cervical back: Normal range of motion and neck supple.      Right lower leg: Edema present.      Left lower leg: Edema present.   Skin:     General: Skin is warm and dry.      Capillary Refill: Capillary refill takes less than 2 seconds.   Neurological:      General: No focal deficit present.      Mental Status: She is alert and oriented to person, place, and time.   Psychiatric:         Mood and Affect: Mood normal.           Lines/Drains:              Lab Results: I have reviewed the following results:   Results from last 7 days   Lab Units 02/10/25  0631   WBC Thousand/uL 2.86*   HEMOGLOBIN g/dL 9.1*   HEMATOCRIT % 30.5*   PLATELETS Thousands/uL 55*   SEGS PCT % 60   LYMPHO PCT % 27   MONO  PCT % 7   EOS PCT % 5     Results from last 7 days   Lab Units 02/10/25  0631   SODIUM mmol/L 139   POTASSIUM mmol/L 3.5   CHLORIDE mmol/L 97   CO2 mmol/L 36*   BUN mg/dL 10   CREATININE mg/dL 0.92   ANION GAP mmol/L 6   CALCIUM mg/dL 8.2*   ALBUMIN g/dL 3.1*   TOTAL BILIRUBIN mg/dL 2.19*   ALK PHOS U/L 202*   ALT U/L 17   AST U/L 43*   GLUCOSE RANDOM mg/dL 98                       Recent Cultures (last 7 days):         Imaging Results Review: I reviewed radiology reports from this admission including: chest xray and Ultrasound(s).  Other Study Results Review: EKG was reviewed.     Last 24 Hours Medication List:     Current Facility-Administered Medications:     acetaminophen (TYLENOL) tablet 488 mg, Q4H PRN    amitriptyline (ELAVIL) tablet 75 mg, HS    cyclobenzaprine (FLEXERIL) tablet 10 mg, TID PRN    dapsone tablet 50 mg, Daily    folic acid (FOLVITE) tablet 1 mg, Daily    furosemide (LASIX) 500 mg infusion 50 mL, Continuous, Last Rate: 15 mg/hr (02/10/25 0849)    lactulose (CHRONULAC) oral solution 30 g, 4x Daily    lidocaine (LIDODERM) 5 % patch 1 patch, Daily    midodrine (PROAMATINE) tablet 12.5 mg, TID AC    nystatin (MYCOSTATIN) powder, BID    oxyCODONE (ROXICODONE) immediate release tablet 10 mg, Q6H PRN    polyethylene glycol (MIRALAX) packet 17 g, Daily PRN    potassium chloride (Klor-Con M20) CR tablet 40 mEq, TID With Meals    rifaximin (XIFAXAN) tablet 550 mg, Q12H MIGUEL    sertraline (ZOLOFT) tablet 100 mg, Daily    trimethobenzamide (TIGAN) IM injection 200 mg, Q6H PRN    ursodiol (ACTIGALL) capsule 300 mg, BID    Administrative Statements   Today, Patient Was Seen By: Idalia Stuart MD      **Please Note: This note may have been constructed using a voice recognition system.**

## 2025-02-10 NOTE — PLAN OF CARE
Problem: PAIN - ADULT  Goal: Verbalizes/displays adequate comfort level or baseline comfort level  Description: Interventions:  - Encourage patient to monitor pain and request assistance  - Assess pain using appropriate pain scale  - Administer analgesics based on type and severity of pain and evaluate response  - Implement non-pharmacological measures as appropriate and evaluate response  - Consider cultural and social influences on pain and pain management  - Notify physician/advanced practitioner if interventions unsuccessful or patient reports new pain  Outcome: Progressing     Problem: INFECTION - ADULT  Goal: Absence or prevention of progression during hospitalization  Description: INTERVENTIONS:  - Assess and monitor for signs and symptoms of infection  - Monitor lab/diagnostic results  - Monitor all insertion sites, i.e. indwelling lines, tubes, and drains  - Monitor endotracheal if appropriate and nasal secretions for changes in amount and color  - [place] appropriate cooling/warming therapies per order  - Administer medications as ordered  - Instruct and encourage patient and family to use good hand hygiene technique  - Identify and instruct in appropriate isolation precautions for identified infection/condition  Outcome: Progressing  Goal: Absence of fever/infection during neutropenic period  Description: INTERVENTIONS:  - Monitor WBC    Outcome: Progressing     Problem: SAFETY ADULT  Goal: Patient will remain free of falls  Description: INTERVENTIONS:  - Educate patient/family on patient safety including physical limitations  - Instruct patient to call for assistance with activity   - Consult OT/PT to assist with strengthening/mobility   - Keep Call bell within reach  - Keep bed low and locked with side rails adjusted as appropriate  - Keep care items and personal belongings within reach  - Initiate and maintain comfort rounds  - Make Fall Risk Sign visible to staff  - Offer Toileting every 2 Hours,  in advance of need  - Initiate/Maintain bed alarm  - Obtain necessary fall risk management equipment  - Apply yellow socks and bracelet for high fall risk patients  - Consider moving patient to room near nurses station  Outcome: Progressing  Goal: Maintain or return to baseline ADL function  Description: INTERVENTIONS:  -  Assess patient's ability to carry out ADLs; assess patient's baseline for ADL function and identify physical deficits which impact ability to perform ADLs (bathing, care of mouth/teeth, toileting, grooming, dressing, etc.)  - Assess/evaluate cause of self-care deficits   - Assess range of motion  - Assess patient's mobility; develop plan if impaired  - Assess patient's need for assistive devices and provide as appropriate  - Encourage maximum independence but intervene and supervise when necessary  - Involve family in performance of ADLs  - Assess for home care needs following discharge   - Consider OT consult to assist with ADL evaluation and planning for discharge  - Provide patient education as appropriate  Outcome: Progressing  Goal: Maintains/Returns to pre admission functional level  Description: INTERVENTIONS:  - Perform AM-PAC 6 Click Basic Mobility/ Daily Activity assessment daily.  - Set and communicate daily mobility goal to care team and patient/family/caregiver.   - Collaborate with rehabilitation services on mobility goals if consulted  - Perform Range of Motion 3 times a day.  - Reposition patient every 2 hours.  - Dangle patient 3 times a day  - Stand patient 3 times a day  - Ambulate patient 3 times a day  - Out of bed to chair 3 times a day   - Out of bed for meals 3 times a day  - Out of bed for toileting  - Record patient progress and toleration of activity level   Outcome: Progressing     Problem: DISCHARGE PLANNING  Goal: Discharge to home or other facility with appropriate resources  Description: INTERVENTIONS:  - Identify barriers to discharge w/patient and caregiver  -  Arrange for needed discharge resources and transportation as appropriate  - Identify discharge learning needs (meds, wound care, etc.)  - Arrange for interpretive services to assist at discharge as needed  - Refer to Case Management Department for coordinating discharge planning if the patient needs post-hospital services based on physician/advanced practitioner order or complex needs related to functional status, cognitive ability, or social support system  Outcome: Progressing     Problem: Knowledge Deficit  Goal: Patient/family/caregiver demonstrates understanding of disease process, treatment plan, medications, and discharge instructions  Description: Complete learning assessment and assess knowledge base.  Interventions:  - Provide teaching at level of understanding  - Provide teaching via preferred learning methods  Outcome: Progressing

## 2025-02-10 NOTE — ASSESSMENT & PLAN NOTE
Body mass index is 47.13 kg/m².  Recommend incorporating a more whole foods plant-predominant diet along with decreasing consumption of red meats and processed foods  Per AHA guidelines, recommend moderate-vigorous intensity exercise for 30 minutes a day for 5 days a week or a total of 150 min/week

## 2025-02-10 NOTE — ASSESSMENT & PLAN NOTE
Hemoglobin 8.1 on arrival.now 6.6 at this time she denies hematochezia or melena as opposed to her recent hospitalization unclear what her baseline is after recent rectal bleed but she was discharged with 8.1 from [place].  Previous appeared to be between 9 and 10.  No current evidence of blood loss.  Continue to monitor.  Occult negative  She is status post 2 units of PRBC transfusion and 2/5-hemoglobin improved to 9.1 now  discussed with GI after evaluation they are not planning to scope with a EGD or colonoscopy.  As there is no active bleeding suspect this could be dilutional also she has liver cirrhosis could be nutritional aspect with iron deficiency as well on initial arrival

## 2025-02-10 NOTE — ASSESSMENT & PLAN NOTE
Blood pressures are stable on midodrine . midodrine has been started to allow diuresis Coreg for now has been discontinued and will be held

## 2025-02-11 LAB
ALBUMIN SERPL BCG-MCNC: 3.1 G/DL (ref 3.5–5)
ALP SERPL-CCNC: 209 U/L (ref 34–104)
ALT SERPL W P-5'-P-CCNC: 17 U/L (ref 7–52)
ANION GAP SERPL CALCULATED.3IONS-SCNC: 6 MMOL/L (ref 4–13)
AST SERPL W P-5'-P-CCNC: 39 U/L (ref 13–39)
BILIRUB SERPL-MCNC: 1.85 MG/DL (ref 0.2–1)
BUN SERPL-MCNC: 10 MG/DL (ref 5–25)
CALCIUM ALBUM COR SERPL-MCNC: 9.1 MG/DL (ref 8.3–10.1)
CALCIUM SERPL-MCNC: 8.4 MG/DL (ref 8.4–10.2)
CHLORIDE SERPL-SCNC: 97 MMOL/L (ref 96–108)
CO2 SERPL-SCNC: 37 MMOL/L (ref 21–32)
CREAT SERPL-MCNC: 0.93 MG/DL (ref 0.6–1.3)
GFR SERPL CREATININE-BSD FRML MDRD: 69 ML/MIN/1.73SQ M
GLUCOSE SERPL-MCNC: 100 MG/DL (ref 65–140)
MAGNESIUM SERPL-MCNC: 1.9 MG/DL (ref 1.9–2.7)
POTASSIUM SERPL-SCNC: 3 MMOL/L (ref 3.5–5.3)
PROT SERPL-MCNC: 6.9 G/DL (ref 6.4–8.4)
SODIUM SERPL-SCNC: 140 MMOL/L (ref 135–147)

## 2025-02-11 PROCEDURE — 99232 SBSQ HOSP IP/OBS MODERATE 35: CPT | Performed by: FAMILY MEDICINE

## 2025-02-11 PROCEDURE — 97116 GAIT TRAINING THERAPY: CPT

## 2025-02-11 PROCEDURE — 83735 ASSAY OF MAGNESIUM: CPT | Performed by: FAMILY MEDICINE

## 2025-02-11 PROCEDURE — 80053 COMPREHEN METABOLIC PANEL: CPT | Performed by: FAMILY MEDICINE

## 2025-02-11 PROCEDURE — 97530 THERAPEUTIC ACTIVITIES: CPT

## 2025-02-11 RX ORDER — POLYETHYLENE GLYCOL 3350 17 G/17G
17 POWDER, FOR SOLUTION ORAL DAILY
Status: DISCONTINUED | OUTPATIENT
Start: 2025-02-11 | End: 2025-02-13 | Stop reason: HOSPADM

## 2025-02-11 RX ORDER — POTASSIUM CHLORIDE 14.9 MG/ML
20 INJECTION INTRAVENOUS ONCE
Status: COMPLETED | OUTPATIENT
Start: 2025-02-11 | End: 2025-02-11

## 2025-02-11 RX ADMIN — RIFAXIMIN 550 MG: 550 TABLET ORAL at 09:56

## 2025-02-11 RX ADMIN — SERTRALINE 100 MG: 100 TABLET, FILM COATED ORAL at 09:56

## 2025-02-11 RX ADMIN — AMITRIPTYLINE HYDROCHLORIDE 75 MG: 25 TABLET, FILM COATED ORAL at 21:01

## 2025-02-11 RX ADMIN — FOLIC ACID 1 MG: 1 TABLET ORAL at 09:57

## 2025-02-11 RX ADMIN — POTASSIUM CHLORIDE 40 MEQ: 1500 TABLET, EXTENDED RELEASE ORAL at 17:45

## 2025-02-11 RX ADMIN — Medication 15 MG/HR: at 04:09

## 2025-02-11 RX ADMIN — NYSTATIN 1 APPLICATION: 100000 POWDER TOPICAL at 17:45

## 2025-02-11 RX ADMIN — RIFAXIMIN 550 MG: 550 TABLET ORAL at 21:01

## 2025-02-11 RX ADMIN — POLYETHYLENE GLYCOL 3350 17 G: 17 POWDER, FOR SOLUTION ORAL at 11:01

## 2025-02-11 RX ADMIN — URSODIOL 300 MG: 300 CAPSULE ORAL at 17:45

## 2025-02-11 RX ADMIN — POTASSIUM CHLORIDE 40 MEQ: 1500 TABLET, EXTENDED RELEASE ORAL at 14:03

## 2025-02-11 RX ADMIN — URSODIOL 300 MG: 300 CAPSULE ORAL at 09:56

## 2025-02-11 RX ADMIN — POTASSIUM CHLORIDE 40 MEQ: 1500 TABLET, EXTENDED RELEASE ORAL at 09:57

## 2025-02-11 RX ADMIN — CYCLOBENZAPRINE 10 MG: 10 TABLET, FILM COATED ORAL at 10:00

## 2025-02-11 RX ADMIN — OXYCODONE HYDROCHLORIDE 10 MG: 10 TABLET ORAL at 10:00

## 2025-02-11 RX ADMIN — POTASSIUM CHLORIDE 20 MEQ: 200 INJECTION, SOLUTION INTRAVENOUS at 09:57

## 2025-02-11 RX ADMIN — DAPSONE 50 MG: 25 TABLET ORAL at 09:58

## 2025-02-11 RX ADMIN — LIDOCAINE 5% 1 PATCH: 700 PATCH TOPICAL at 09:57

## 2025-02-11 RX ADMIN — LACTULOSE 30 G: 20 SOLUTION ORAL at 09:57

## 2025-02-11 RX ADMIN — CYCLOBENZAPRINE 10 MG: 10 TABLET, FILM COATED ORAL at 17:51

## 2025-02-11 RX ADMIN — NYSTATIN: 100000 POWDER TOPICAL at 09:58

## 2025-02-11 RX ADMIN — OXYCODONE HYDROCHLORIDE 10 MG: 10 TABLET ORAL at 17:51

## 2025-02-11 RX ADMIN — MIDODRINE HYDROCHLORIDE 12.5 MG: 5 TABLET ORAL at 06:13

## 2025-02-11 RX ADMIN — LACTULOSE 30 G: 20 SOLUTION ORAL at 14:03

## 2025-02-11 NOTE — PHYSICAL THERAPY NOTE
"   PHYSICAL THERAPY TREATMENT NOTE  NAME:  Bailey Olsen  DATE: 02/11/25    Length Of Stay: 8  Performed at least 2 patient identifiers during session: Name and ID bracelet    TREATMENT FLOWSHEET:    02/11/25 1549   PT Last Visit   PT Visit Date 02/11/25   Note Type   Note Type Treatment   Pain Assessment   Pain Assessment Tool 0-10   Pain Score 5   Pain Location/Orientation Location: Abdomen   Pain Onset/Description Onset: Ongoing;Frequency: Constant/Continuous;Descriptor: Aching;Descriptor: Sore   Patient's Stated Pain Goal No pain   Hospital Pain Intervention(s) Repositioned;Ambulation/increased activity   Restrictions/Precautions   Weight Bearing Precautions Per Order No   Other Precautions Chair Alarm;Bed Alarm;Fall Risk;Multiple lines   General   Chart Reviewed Yes   Response to Previous Treatment Patient with no complaints from previous session.   Family/Caregiver Present No   Cognition   Overall Cognitive Status WFL   Arousal/Participation Alert;Cooperative   Attention Within functional limits   Orientation Level Oriented X4   Memory Within functional limits   Following Commands Follows all commands and directions without difficulty   Subjective   Subjective \"Im glad you came, I wanted to go for a walk.\"   Bed Mobility   Supine to Sit 6  Modified independent   Additional items HOB elevated;Increased time required   Sit to Supine 6  Modified independent   Additional items HOB elevated;Increased time required   Transfers   Sit to Stand 5  Supervision   Additional items Assist x 1;Increased time required   Stand to Sit 5  Supervision   Additional items Assist x 1;Increased time required   Stand pivot 5  Supervision   Additional items Assist x 1;Increased time required;Verbal cues  (RW)   Ambulation/Elevation   Gait pattern Improper Weight shift;Decreased foot clearance;Wide DENICE;Step through pattern   Gait Assistance 5  Supervision   Additional items Assist x 1;Verbal cues   Assistive Device Rolling walker "   Distance 325 ft   Balance   Static Sitting Normal   Dynamic Sitting Fair +   Static Standing Fair +   Dynamic Standing Fair   Ambulatory Fair   Endurance Deficit   Endurance Deficit No   Activity Tolerance   Activity Tolerance Patient tolerated treatment well   Nurse Made Aware yes   Assessment   Prognosis Good   Problem List Decreased strength;Impaired balance;Decreased mobility;Obesity;Pain   Goals   Patient Goals to go for a walk   PT Treatment Day 1   Plan   Treatment/Interventions Functional transfer training;LE strengthening/ROM;Endurance training;Patient/family training;Equipment eval/education;Bed mobility;Gait training;Compensatory technique education;Spoke to nursing   Progress Progressing toward goals   PT Frequency 1-2x/wk   Discharge Recommendation   Rehab Resource Intensity Level, PT III (Minimum Resource Intensity)   AM-PAC Basic Mobility Inpatient   Turning in Flat Bed Without Bedrails 4   Lying on Back to Sitting on Edge of Flat Bed Without Bedrails 4   Moving Bed to Chair 3   Standing Up From Chair Using Arms 3   Walk in Room 3   Climb 3-5 Stairs With Railing 3   Basic Mobility Inpatient Raw Score 20   Basic Mobility Standardized Score 43.99   [facility] Highest Level Of Mobility   -HL Goal 6: Walk 10 steps or more   -HLM Achieved 8: Walk 250 feet ot more         The patient's AM-[facility] Basic Mobility Inpatient Short Form Raw Score is 20. A raw score greater than 16 suggests the patient may benefit from discharge to home. Please also refer to the recommendation of the Physical Therapist for safe discharge planning.    Pt seen for PT treatment session this date with interventions consisting of bed mobility tasks, transfer training, gait training, and education provided as needed for safety and direction to improve functional mobility, safety awareness, and activity tolerance. Pt agreeable to PT treatment session upon arrival, pt found resting in bed. At end of session, pt left in bed and with  all needs in reach. In comparison to previous session, pt with improvements in ambulation distance . Continue to recommend Level III (Minimum Resource Intensity) at time of d/c in order to maximize pt's functional independence and safety w/ mobility. Pt continues to be functioning below baseline level. PT will continue to see pt while here in order to address the deficits listed above and provide interventions consistent w/ POC in effort to achieve STGs.    Magda Portillo, PTA

## 2025-02-11 NOTE — ASSESSMENT & PLAN NOTE
Hemoglobin 8.1 on arrival.now 6.6 at this time she denies hematochezia or melena as opposed to her recent hospitalization unclear what her baseline is after recent rectal bleed but she was discharged with 8.1 from [place].  Previous appeared to be between 9 and 10.  No current evidence of blood loss.  Continue to monitor.  Occult negative  She is status post 2 units of PRBC transfusion and 2/5-hemoglobin improved to 9.1 now  discussed with GI after evaluation they are not planning to scope with a EGD or colonoscopy.  As there is no active bleeding suspect this could be dilutional also she has liver cirrhosis could be nutritional aspect with iron deficiency as well on initial arrival

## 2025-02-11 NOTE — ASSESSMENT & PLAN NOTE
Body mass index is 46.73 kg/m².  Recommend incorporating a more whole foods plant-predominant diet along with decreasing consumption of red meats and processed foods  Per AHA guidelines, recommend moderate-vigorous intensity exercise for 30 minutes a day for 5 days a week or a total of 150 min/week

## 2025-02-11 NOTE — CASE MANAGEMENT
Case Management Discharge Planning Note    Patient name Bailey Olsen  Location /-01 MRN [id number]  : 1970 Date 2025       Current Admission Date: 2/3/2025  Current Admission Diagnosis:Liver cirrhosis secondary to HYATT (nonalcoholic steatohepatitis) (HCC)   Patient Active Problem List    Diagnosis Date Noted Date Diagnosed    Upper GI bleed 2025     Acute anemia 2025     Rectal bleeding 2025     Acute blood loss anemia 2025     Chronic, continuous use of opioids 2025     Acute metabolic encephalopathy 2025     SIRS (systemic inflammatory response syndrome) ([facility]) 2025     Influenza B 2024     Sacral fracture, closed ([facility]) 11/15/2024     KISHORE (acute kidney injury) ([facility]) 10/04/2024     Morbid obesity with BMI of 40.0-44.9, adult ([facility]) 10/04/2024     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) 2024     Fall from standing 2024     Closed compression fracture of L1 lumbar vertebra, sequela 2024     Closed stable burst fracture of first lumbar vertebra with routine healing 2024     Groin hematoma 2024     Ambulatory dysfunction 2024     Intractable back pain 2024     Hyperammonemia (HCC) 2024     Hepatic encephalopathy (HCC) 2024     Fall 2024     Liver cirrhosis secondary to HYATT (nonalcoholic steatohepatitis) (HCC) 2024     History of malignant carcinoid tumor of small intestine 2024     Class 3 severe obesity due to excess calories in adult (HCC) 2024     Primary hypertension 2024     Hypokalemia 2024     Fibromyalgia 2024     Iron deficiency anemia 2024     Pancytopenia (HCC) 2024     Sarcoidosis 2024     Bilateral pulmonary infiltrates 2024       LOS (days): 8  Geometric Mean LOS (GMLOS) (days): 3.2  Days to GMLOS:-4.3     OBJECTIVE:  Risk of Unplanned Readmission Score: 60.82         Current  admission status: Inpatient   Preferred Pharmacy:   [id number] - [place] - [address]  [address] [address]  Phone: [phone] Fax: [phone]    [id number] [place] [address]  [address] [address]  Phone: [phone] Fax: [phone]    Primary Care Provider: Nayely Brown DO    Primary Insurance: BLUE CROSS  Secondary Insurance: MEDICARE    DISCHARGE DETAILS:     Chart reviewed aware of medical management. Case was discussed in multidisciplinary discharge meeting.  Clinical information supporting hospitalization: pt admitted on 2/03/25 DX: Liver cirrhosis secondary to HYATT. GI consulted. Pt is not medically stable for discharge continues on IV lasix drip, continuing to monitor vital signs, daily weights, and I&Os.     Barriers to discharge:  - medical management  Discharge plan: discharge home with [facility] home [facility] for PT/OT, SN.     CM will continue to follow plan of care.

## 2025-02-11 NOTE — PROGRESS NOTES
Progress Note - Hospitalist   Name: Bailey Olsen 54 y.o. female I MRN: [id number]  Unit/Bed#: -01 I Date of Admission: 2/3/2025   Date of Service: 2/11/2025 I Hospital Day: 8    Assessment & Plan  Liver cirrhosis secondary to HYATT (nonalcoholic steatohepatitis) (HCC)  History of HYATT cirrhosis.  Recent EGD without evidence of varices  Home regimen: rifaximin, lactulose, carvedilol, and torsemide. States she has not been taking spironolactone.   - Of note: patient did not sound confident in endorsing adherence to her diuretic regimen.  Decompensated with anasarca  Ultrasound shows trace ascites  Continue Lasix at 15 mg/h .so far lost 20 lbs  Daily weight   2decho- nml systolic and diastolic function done 10/2024  Midodrine for diureses increase to 12.5 mg po tid   Hypokalemia  cont kcl to 40 meq po tid. Also additional 20 meq iv kcl for today   Iron deficiency anemia  Hemoglobin 8.1 on arrival.now 6.6 at this time she denies hematochezia or melena as opposed to her recent hospitalization unclear what her baseline is after recent rectal bleed but she was discharged with 8.1 from [facility].  Previous appeared to be between 9 and 10.  No current evidence of blood loss.  Continue to monitor.  Occult negative  She is status post 2 units of PRBC transfusion and 2/5-hemoglobin improved to 9.1 now  discussed with GI after evaluation they are not planning to scope with a EGD or colonoscopy.  As there is no active bleeding suspect this could be dilutional also she has liver cirrhosis could be nutritional aspect with iron deficiency as well on initial arrival     Fibromyalgia  History of chronic, diffuse pain. Will continue home regimen.  Chronic, continuous use of opioids  Continue home pain regimen.oxycodone prn   Opioid dependence in the setting of chronic pain, POA, evidenced by outpatient script for oxycodone 10mg q6h prn and continued while IP   Class 3 severe obesity due to excess calories in adult (HCC)  Body  mass index is 46.73 kg/m².  Recommend incorporating a more whole foods plant-predominant diet along with decreasing consumption of red meats and processed foods  Per AHA guidelines, recommend moderate-vigorous intensity exercise for 30 minutes a day for 5 days a week or a total of 150 min/week   Primary hypertension  Blood pressures are stable on midodrine . midodrine has been started to allow diuresis Coreg for now has been discontinued and will be held  Pancytopenia (HCC)  Secondary to liver cirrhosis platelets-dropped down below 70,000 again.  Discontinued heparin at this time 2/7    VTE Pharmacologic Prophylaxis: VTE Score: 4 Moderate Risk (Score 3-4) - Pharmacological DVT Prophylaxis Contraindicated. Sequential Compression Devices Ordered.    Mobility:   Basic Mobility Inpatient Raw Score: 23  JH-HLM Goal: 7: Walk 25 feet or more  JH-HLM Achieved: 7: Walk 25 feet or more  JH-HLM Goal achieved. Continue to encourage appropriate mobility.    Patient Centered Rounds: I performed bedside rounds with nursing staff today.   Discussions with Specialists or Other Care Team Provider: none    Education and Discussions with Family / Patient: updated spouse yesterday    Current Length of Stay: 8 day(s)  Current Patient Status: Inpatient   Certification Statement: The patient will continue to require additional inpatient hospital stay due to decompensated liver cirrhosis  Discharge Plan: Anticipate discharge in 48-72 hrs to home with home services.    Code Status: Level 1 - Full Code    Subjective   Denies any complaints denies any chest pain or shortness of breath feels her swelling is decreasing and she is urinating a lot    Objective :  Temp:  [98.5 °F (36.9 °C)-99.6 °F (37.6 °C)] 98.5 °F (36.9 °C)  HR:  [82-98] 83  BP: (116-150)/(67-86) 138/75  Resp:  [17-18] 18  SpO2:  [91 %-95 %] 92 %    Body mass index is 46.73 kg/m².     Input and Output Summary (last 24 hours):     Intake/Output Summary (Last 24 hours) at 2/11/2025  1023  Last data filed at 2/11/2025 0609  Gross per 24 hour   Intake 920 ml   Output 2250 ml   Net -1330 ml       Physical Exam  Vitals and nursing note reviewed.   Constitutional:       Appearance: Normal appearance.   HENT:      Head: Normocephalic and atraumatic.      Right Ear: External ear normal.      Left Ear: External ear normal.      Nose: Nose normal.      Mouth/Throat:      Pharynx: Oropharynx is clear.   Eyes:      Pupils: Pupils are equal, round, and reactive to light.   Cardiovascular:      Rate and Rhythm: Normal rate and regular rhythm.      Heart sounds: Normal heart sounds.   Pulmonary:      Effort: Pulmonary effort is normal.      Breath sounds: Normal breath sounds.   Abdominal:      General: Bowel sounds are normal. There is distension.      Palpations: Abdomen is soft.      Tenderness: There is no abdominal tenderness.   Musculoskeletal:         General: Normal range of motion.      Cervical back: Normal range of motion and neck supple.      Right lower leg: Edema present.      Left lower leg: Edema present.   Skin:     General: Skin is warm and dry.      Capillary Refill: Capillary refill takes less than 2 seconds.   Neurological:      General: No focal deficit present.      Mental Status: She is alert and oriented to person, place, and time.   Psychiatric:         Mood and Affect: Mood normal.           Lines/Drains:              Lab Results: I have reviewed the following results:   Results from last 7 days   Lab Units 02/10/25  0631   WBC Thousand/uL 2.86*   HEMOGLOBIN g/dL 9.1*   HEMATOCRIT % 30.5*   PLATELETS Thousands/uL 55*   SEGS PCT % 60   LYMPHO PCT % 27   MONO PCT % 7   EOS PCT % 5     Results from last 7 days   Lab Units 02/11/25  0619   SODIUM mmol/L 140   POTASSIUM mmol/L 3.0*   CHLORIDE mmol/L 97   CO2 mmol/L 37*   BUN mg/dL 10   CREATININE mg/dL 0.93   ANION GAP mmol/L 6   CALCIUM mg/dL 8.4   ALBUMIN g/dL 3.1*   TOTAL BILIRUBIN mg/dL 1.85*   ALK PHOS U/L 209*   ALT U/L 17   AST  U/L 39   GLUCOSE RANDOM mg/dL 100                       Recent Cultures (last 7 days):         Imaging Results Review: No pertinent imaging studies reviewed.  Other Study Results Review: EKG was reviewed.     Last 24 Hours Medication List:     Current Facility-Administered Medications:     acetaminophen (TYLENOL) tablet 488 mg, Q4H PRN    amitriptyline (ELAVIL) tablet 75 mg, HS    cyclobenzaprine (FLEXERIL) tablet 10 mg, TID PRN    dapsone tablet 50 mg, Daily    folic acid (FOLVITE) tablet 1 mg, Daily    furosemide (LASIX) 500 mg infusion 50 mL, Continuous, Last Rate: 15 mg/hr (02/11/25 0409)    lactulose (CHRONULAC) oral solution 30 g, 4x Daily    lidocaine (LIDODERM) 5 % patch 1 patch, Daily    midodrine (PROAMATINE) tablet 12.5 mg, TID AC    nystatin (MYCOSTATIN) powder, BID    oxyCODONE (ROXICODONE) immediate release tablet 10 mg, Q6H PRN    polyethylene glycol (MIRALAX) packet 17 g, Daily    potassium chloride (Klor-Con M20) CR tablet 40 mEq, TID With Meals    potassium chloride 20 mEq IVPB (premix), Once, Last Rate: 20 mEq (02/11/25 0957)    rifaximin (XIFAXAN) tablet 550 mg, Q12H MIGUEL    sertraline (ZOLOFT) tablet 100 mg, Daily    trimethobenzamide (TIGAN) IM injection 200 mg, Q6H PRN    ursodiol (ACTIGALL) capsule 300 mg, BID    Administrative Statements   Today, Patient Was Seen By: Idalia Stuart MD      **Please Note: This note may have been constructed using a voice recognition system.**

## 2025-02-11 NOTE — PLAN OF CARE
Problem: PAIN - ADULT  Goal: Verbalizes/displays adequate comfort level or baseline comfort level  Description: Interventions:  - Encourage patient to monitor pain and request assistance  - Assess pain using appropriate pain scale  - Administer analgesics based on type and severity of pain and evaluate response  - Implement non-pharmacological measures as appropriate and evaluate response  - Consider cultural and social influences on pain and pain management  - Notify physician/advanced practitioner if interventions unsuccessful or patient reports new pain  Outcome: Progressing     Problem: INFECTION - ADULT  Goal: Absence or prevention of progression during hospitalization  Description: INTERVENTIONS:  - Assess and monitor for signs and symptoms of infection  - Monitor lab/diagnostic results  - Monitor all insertion sites, i.e. indwelling lines, tubes, and drains  - Monitor endotracheal if appropriate and nasal secretions for changes in amount and color  - [place] appropriate cooling/warming therapies per order  - Administer medications as ordered  - Instruct and encourage patient and family to use good hand hygiene technique  - Identify and instruct in appropriate isolation precautions for identified infection/condition  Outcome: Progressing  Goal: Absence of fever/infection during neutropenic period  Description: INTERVENTIONS:  - Monitor WBC    Outcome: Progressing     Problem: SAFETY ADULT  Goal: Patient will remain free of falls  Description: INTERVENTIONS:  - Educate patient/family on patient safety including physical limitations  - Instruct patient to call for assistance with activity   - Consult OT/PT to assist with strengthening/mobility   - Keep Call bell within reach  - Keep bed low and locked with side rails adjusted as appropriate  - Keep care items and personal belongings within reach  - Initiate and maintain comfort rounds  - Make Fall Risk Sign visible to staff  - Offer Toileting every 2 Hours,  in advance of need  - Initiate/Maintain alarm  - Obtain necessary fall risk management equipment:   - Apply yellow socks and bracelet for high fall risk patients  - Consider moving patient to room near nurses station  Outcome: Progressing  Goal: Maintain or return to baseline ADL function  Description: INTERVENTIONS:  -  Assess patient's ability to carry out ADLs; assess patient's baseline for ADL function and identify physical deficits which impact ability to perform ADLs (bathing, care of mouth/teeth, toileting, grooming, dressing, etc.)  - Assess/evaluate cause of self-care deficits   - Assess range of motion  - Assess patient's mobility; develop plan if impaired  - Assess patient's need for assistive devices and provide as appropriate  - Encourage maximum independence but intervene and supervise when necessary  - Involve family in performance of ADLs  - Assess for home care needs following discharge   - Consider OT consult to assist with ADL evaluation and planning for discharge  - Provide patient education as appropriate  Outcome: Progressing  Goal: Maintains/Returns to pre admission functional level  Description: INTERVENTIONS:  - Perform AM-PAC 6 Click Basic Mobility/ Daily Activity assessment daily.  - Set and communicate daily mobility goal to care team and patient/family/caregiver.   - Collaborate with rehabilitation services on mobility goals if consulted  - Ambulate patient 3 times a day  - Out of bed to chair 3 times a day   - Out of bed for meals 3 times a day  - Out of bed for toileting  - Record patient progress and toleration of activity level   Outcome: Progressing     Problem: DISCHARGE PLANNING  Goal: Discharge to home or other facility with appropriate resources  Description: INTERVENTIONS:  - Identify barriers to discharge w/patient and caregiver  - Arrange for needed discharge resources and transportation as appropriate  - Identify discharge learning needs (meds, wound care, etc.)  - Arrange  for interpretive services to assist at discharge as needed  - Refer to Case Management Department for coordinating discharge planning if the patient needs post-hospital services based on physician/advanced practitioner order or complex needs related to functional status, cognitive ability, or social support system  Outcome: Progressing     Problem: Knowledge Deficit  Goal: Patient/family/caregiver demonstrates understanding of disease process, treatment plan, medications, and discharge instructions  Description: Complete learning assessment and assess knowledge base.  Interventions:  - Provide teaching at level of understanding  - Provide teaching via preferred learning methods  Outcome: Progressing

## 2025-02-11 NOTE — ASSESSMENT & PLAN NOTE
History of HYATT cirrhosis.  Recent EGD without evidence of varices  Home regimen: rifaximin, lactulose, carvedilol, and torsemide. States she has not been taking spironolactone.   - Of note: patient did not sound confident in endorsing adherence to her diuretic regimen.  Decompensated with anasarca  Ultrasound shows trace ascites  Continue Lasix at 15 mg/h .so far lost 20 lbs  Daily weight   2decho- nml systolic and diastolic function done 10/2024  Midodrine for diureses increase to 12.5 mg po tid

## 2025-02-11 NOTE — PLAN OF CARE
Problem: PAIN - ADULT  Goal: Verbalizes/displays adequate comfort level or baseline comfort level  Description: Interventions:  - Encourage patient to monitor pain and request assistance  - Assess pain using appropriate pain scale  - Administer analgesics based on type and severity of pain and evaluate response  - Implement non-pharmacological measures as appropriate and evaluate response  - Consider cultural and social influences on pain and pain management  - Notify physician/advanced practitioner if interventions unsuccessful or patient reports new pain  Outcome: Progressing     Problem: INFECTION - ADULT  Goal: Absence or prevention of progression during hospitalization  Description: INTERVENTIONS:  - Assess and monitor for signs and symptoms of infection  - Monitor lab/diagnostic results  - Monitor all insertion sites, i.e. indwelling lines, tubes, and drains  - Monitor endotracheal if appropriate and nasal secretions for changes in amount and color  - [place] appropriate cooling/warming therapies per order  - Administer medications as ordered  - Instruct and encourage patient and family to use good hand hygiene technique  - Identify and instruct in appropriate isolation precautions for identified infection/condition  Outcome: Progressing  Goal: Absence of fever/infection during neutropenic period  Description: INTERVENTIONS:  - Monitor WBC    Outcome: Progressing     Problem: SAFETY ADULT  Goal: Patient will remain free of falls  Description: INTERVENTIONS:  - Educate patient/family on patient safety including physical limitations  - Instruct patient to call for assistance with activity   - Consult OT/PT to assist with strengthening/mobility   - Keep Call bell within reach  - Keep bed low and locked with side rails adjusted as appropriate  - Keep care items and personal belongings within reach  - Initiate and maintain comfort rounds  - Make Fall Risk Sign visible to staff  - Offer Toileting every 2 Hours,  in advance of need  - Initiate/Maintain bed alarm  - Obtain necessary fall risk management equipment  - Apply yellow socks and bracelet for high fall risk patients  - Consider moving patient to room near nurses station  Outcome: Progressing  Goal: Maintain or return to baseline ADL function  Description: INTERVENTIONS:  -  Assess patient's ability to carry out ADLs; assess patient's baseline for ADL function and identify physical deficits which impact ability to perform ADLs (bathing, care of mouth/teeth, toileting, grooming, dressing, etc.)  - Assess/evaluate cause of self-care deficits   - Assess range of motion  - Assess patient's mobility; develop plan if impaired  - Assess patient's need for assistive devices and provide as appropriate  - Encourage maximum independence but intervene and supervise when necessary  - Involve family in performance of ADLs  - Assess for home care needs following discharge   - Consider OT consult to assist with ADL evaluation and planning for discharge  - Provide patient education as appropriate  Outcome: Progressing  Goal: Maintains/Returns to pre admission functional level  Description: INTERVENTIONS:  - Perform AM-PAC 6 Click Basic Mobility/ Daily Activity assessment daily.  - Set and communicate daily mobility goal to care team and patient/family/caregiver.   - Collaborate with rehabilitation services on mobility goals if consulted  - Perform Range of Motion 3 times a day.  - Reposition patient every 2 hours.  - Dangle patient 3 times a day  - Stand patient 3 times a day  - Ambulate patient 3 times a day  - Out of bed to chair 3 times a day   - Out of bed for meals 3 times a day  - Out of bed for toileting  - Record patient progress and toleration of activity level   Outcome: Progressing     Problem: DISCHARGE PLANNING  Goal: Discharge to home or other facility with appropriate resources  Description: INTERVENTIONS:  - Identify barriers to discharge w/patient and caregiver  -  Arrange for needed discharge resources and transportation as appropriate  - Identify discharge learning needs (meds, wound care, etc.)  - Arrange for interpretive services to assist at discharge as needed  - Refer to Case Management Department for coordinating discharge planning if the patient needs post-hospital services based on physician/advanced practitioner order or complex needs related to functional status, cognitive ability, or social support system  Outcome: Progressing     Problem: Knowledge Deficit  Goal: Patient/family/caregiver demonstrates understanding of disease process, treatment plan, medications, and discharge instructions  Description: Complete learning assessment and assess knowledge base.  Interventions:  - Provide teaching at level of understanding  - Provide teaching via preferred learning methods  Outcome: Progressing

## 2025-02-12 LAB
AMMONIA PLAS-SCNC: 77 UMOL/L (ref 18–72)
ANION GAP SERPL CALCULATED.3IONS-SCNC: 4 MMOL/L (ref 4–13)
ANION GAP SERPL CALCULATED.3IONS-SCNC: 5 MMOL/L (ref 4–13)
BUN SERPL-MCNC: 11 MG/DL (ref 5–25)
BUN SERPL-MCNC: 12 MG/DL (ref 5–25)
CALCIUM SERPL-MCNC: 8.2 MG/DL (ref 8.4–10.2)
CALCIUM SERPL-MCNC: 8.3 MG/DL (ref 8.4–10.2)
CHLORIDE SERPL-SCNC: 96 MMOL/L (ref 96–108)
CHLORIDE SERPL-SCNC: 98 MMOL/L (ref 96–108)
CO2 SERPL-SCNC: 37 MMOL/L (ref 21–32)
CO2 SERPL-SCNC: 38 MMOL/L (ref 21–32)
CREAT SERPL-MCNC: 0.88 MG/DL (ref 0.6–1.3)
CREAT SERPL-MCNC: 1.08 MG/DL (ref 0.6–1.3)
GFR SERPL CREATININE-BSD FRML MDRD: 57 ML/MIN/1.73SQ M
GFR SERPL CREATININE-BSD FRML MDRD: 74 ML/MIN/1.73SQ M
GLUCOSE SERPL-MCNC: 180 MG/DL (ref 65–140)
GLUCOSE SERPL-MCNC: 98 MG/DL (ref 65–140)
POTASSIUM SERPL-SCNC: 3 MMOL/L (ref 3.5–5.3)
POTASSIUM SERPL-SCNC: 3.2 MMOL/L (ref 3.5–5.3)
SODIUM SERPL-SCNC: 139 MMOL/L (ref 135–147)
SODIUM SERPL-SCNC: 139 MMOL/L (ref 135–147)

## 2025-02-12 PROCEDURE — 99232 SBSQ HOSP IP/OBS MODERATE 35: CPT | Performed by: FAMILY MEDICINE

## 2025-02-12 PROCEDURE — 82140 ASSAY OF AMMONIA: CPT | Performed by: FAMILY MEDICINE

## 2025-02-12 PROCEDURE — 80048 BASIC METABOLIC PNL TOTAL CA: CPT | Performed by: FAMILY MEDICINE

## 2025-02-12 RX ORDER — POTASSIUM CHLORIDE 14.9 MG/ML
20 INJECTION INTRAVENOUS ONCE
Status: COMPLETED | OUTPATIENT
Start: 2025-02-12 | End: 2025-02-12

## 2025-02-12 RX ADMIN — POTASSIUM CHLORIDE 20 MEQ: 200 INJECTION, SOLUTION INTRAVENOUS at 10:19

## 2025-02-12 RX ADMIN — MIDODRINE HYDROCHLORIDE 12.5 MG: 5 TABLET ORAL at 06:30

## 2025-02-12 RX ADMIN — MIDODRINE HYDROCHLORIDE 12.5 MG: 5 TABLET ORAL at 17:12

## 2025-02-12 RX ADMIN — Medication 15 MG/HR: at 21:08

## 2025-02-12 RX ADMIN — FOLIC ACID 1 MG: 1 TABLET ORAL at 08:07

## 2025-02-12 RX ADMIN — SERTRALINE 100 MG: 100 TABLET, FILM COATED ORAL at 08:07

## 2025-02-12 RX ADMIN — POTASSIUM CHLORIDE 20 MEQ: 200 INJECTION, SOLUTION INTRAVENOUS at 06:30

## 2025-02-12 RX ADMIN — Medication 15 MG/HR: at 08:14

## 2025-02-12 RX ADMIN — NYSTATIN 1 APPLICATION: 100000 POWDER TOPICAL at 17:12

## 2025-02-12 RX ADMIN — DAPSONE 50 MG: 25 TABLET ORAL at 08:08

## 2025-02-12 RX ADMIN — RIFAXIMIN 550 MG: 550 TABLET ORAL at 21:07

## 2025-02-12 RX ADMIN — POTASSIUM CHLORIDE 40 MEQ: 1500 TABLET, EXTENDED RELEASE ORAL at 17:12

## 2025-02-12 RX ADMIN — RIFAXIMIN 550 MG: 550 TABLET ORAL at 08:07

## 2025-02-12 RX ADMIN — CYCLOBENZAPRINE 10 MG: 10 TABLET, FILM COATED ORAL at 21:06

## 2025-02-12 RX ADMIN — AMITRIPTYLINE HYDROCHLORIDE 75 MG: 25 TABLET, FILM COATED ORAL at 21:07

## 2025-02-12 RX ADMIN — CYCLOBENZAPRINE 10 MG: 10 TABLET, FILM COATED ORAL at 08:07

## 2025-02-12 RX ADMIN — LACTULOSE 30 G: 20 SOLUTION ORAL at 17:12

## 2025-02-12 RX ADMIN — OXYCODONE HYDROCHLORIDE 10 MG: 10 TABLET ORAL at 08:07

## 2025-02-12 RX ADMIN — URSODIOL 300 MG: 300 CAPSULE ORAL at 17:13

## 2025-02-12 RX ADMIN — LACTULOSE 30 G: 20 SOLUTION ORAL at 12:44

## 2025-02-12 RX ADMIN — URSODIOL 300 MG: 300 CAPSULE ORAL at 08:11

## 2025-02-12 RX ADMIN — POTASSIUM CHLORIDE 40 MEQ: 1500 TABLET, EXTENDED RELEASE ORAL at 08:07

## 2025-02-12 RX ADMIN — LACTULOSE 30 G: 20 SOLUTION ORAL at 08:07

## 2025-02-12 RX ADMIN — POTASSIUM CHLORIDE 40 MEQ: 1500 TABLET, EXTENDED RELEASE ORAL at 12:44

## 2025-02-12 RX ADMIN — NYSTATIN 1 APPLICATION: 100000 POWDER TOPICAL at 08:08

## 2025-02-12 NOTE — ASSESSMENT & PLAN NOTE
Body mass index is 46.43 kg/m².  Recommend incorporating a more whole foods plant-predominant diet along with decreasing consumption of red meats and processed foods  Per AHA guidelines, recommend moderate-vigorous intensity exercise for 30 minutes a day for 5 days a week or a total of 150 min/week

## 2025-02-12 NOTE — PROGRESS NOTES
Progress Note - Hospitalist   Name: Bailey Olsen 54 y.o. female I MRN: [id number]  Unit/Bed#: -01 I Date of Admission: 2/3/2025   Date of Service: 2/12/2025 I Hospital Day: 9    Assessment & Plan  Liver cirrhosis secondary to HYATT (nonalcoholic steatohepatitis) (HCC)  History of HYATT cirrhosis.  Recent EGD without evidence of varices  Home regimen: rifaximin, lactulose, carvedilol, and torsemide. States she has not been taking spironolactone.   - Of note: patient did not sound confident in endorsing adherence to her diuretic regimen.  Decompensated with anasarca  Ultrasound shows trace ascites  Continue Lasix at 15 mg/h .so far lost 22 lbs  Daily weight   2decho- nml systolic and diastolic function done 10/2024  Midodrine for diureses increase to 12.5 mg po tid   Hypokalemia  cont kcl to 40 meq po tid. Also additional 40 meq iv kcl for today   Iron deficiency anemia  Hemoglobin 8.1 on arrival.now 6.6 at this time she denies hematochezia or melena as opposed to her recent hospitalization unclear what her baseline is after recent rectal bleed but she was discharged with 8.1 from [place].  Previous appeared to be between 9 and 10.  No current evidence of blood loss.  Continue to monitor.  Occult negative  She is status post 2 units of PRBC transfusion and 2/5-hemoglobin improved to 9.1 now  discussed with GI after evaluation they are not planning to scope with a EGD or colonoscopy.  As there is no active bleeding suspect this could be dilutional also she has liver cirrhosis could be nutritional aspect with iron deficiency as well on initial arrival     Fibromyalgia  History of chronic, diffuse pain. Will continue home regimen.  Chronic, continuous use of opioids  Continue home pain regimen.oxycodone prn   Opioid dependence in the setting of chronic pain, POA, evidenced by outpatient script for oxycodone 10mg q6h prn and continued while IP   Class 3 severe obesity due to excess calories in adult (HCC)  Body  mass index is 46.43 kg/m².  Recommend incorporating a more whole foods plant-predominant diet along with decreasing consumption of red meats and processed foods  Per AHA guidelines, recommend moderate-vigorous intensity exercise for 30 minutes a day for 5 days a week or a total of 150 min/week   Primary hypertension  Blood pressures are stable on midodrine . midodrine has been started to allow diuresis Coreg for now has been discontinued and will be held  Pancytopenia (HCC)  Secondary to liver cirrhosis platelets-dropped down below 70,000 again.  Discontinued heparin at this time 2/7    VTE Pharmacologic Prophylaxis: VTE Score: 4 Moderate Risk (Score 3-4) - Pharmacological DVT Prophylaxis Contraindicated. Sequential Compression Devices Ordered.    Mobility:   Basic Mobility Inpatient Raw Score: 22  JH-HLM Goal: 7: Walk 25 feet or more  JH-HLM Achieved: 6: Walk 10 steps or more  JH-HLM Goal achieved. Continue to encourage appropriate mobility.    Patient Centered Rounds: I performed bedside rounds with nursing staff today.   Discussions with Specialists or Other Care Team Provider: none    Education and Discussions with Family / Patient: will update    Current Length of Stay: 9 day(s)  Current Patient Status: Inpatient   Certification Statement: The patient will continue to require additional inpatient hospital stay due to anasarca  Discharge Plan: Anticipate discharge tomorrow to home with home services.    Code Status: Level 1 - Full Code    Subjective   Patient denies any chest pain or shortness of breath.    Objective :  Temp:  [98.1 °F (36.7 °C)-99 °F (37.2 °C)] 98.1 °F (36.7 °C)  HR:  [79-92] 79  BP: ()/(56-80) 117/64  Resp:  [16-18] 18  SpO2:  [90 %-100 %] 95 %  O2 Device: None (Room air)    Body mass index is 46.43 kg/m².     Input and Output Summary (last 24 hours):     Intake/Output Summary (Last 24 hours) at 2/12/2025 1005  Last data filed at 2/12/2025 0900  Gross per 24 hour   Intake 1300 ml    Output 2631 ml   Net -1331 ml       Physical Exam  Vitals and nursing note reviewed.   Constitutional:       Appearance: Normal appearance.   HENT:      Head: Normocephalic and atraumatic.      Right Ear: External ear normal.      Left Ear: External ear normal.      Nose: Nose normal.      Mouth/Throat:      Pharynx: Oropharynx is clear.   Cardiovascular:      Rate and Rhythm: Normal rate and regular rhythm.      Heart sounds: Normal heart sounds.   Pulmonary:      Effort: Pulmonary effort is normal.      Breath sounds: Normal breath sounds.   Abdominal:      General: Bowel sounds are normal.      Palpations: Abdomen is soft.      Tenderness: There is no abdominal tenderness.   Musculoskeletal:         General: Normal range of motion.      Cervical back: Normal range of motion and neck supple.      Right lower leg: Edema present.      Left lower leg: Edema present.   Skin:     General: Skin is warm and dry.      Capillary Refill: Capillary refill takes less than 2 seconds.   Neurological:      General: No focal deficit present.      Mental Status: She is alert and oriented to person, place, and time.   Psychiatric:         Mood and Affect: Mood normal.           Lines/Drains:              Lab Results: I have reviewed the following results:   Results from last 7 days   Lab Units 02/10/25  0631   WBC Thousand/uL 2.86*   HEMOGLOBIN g/dL 9.1*   HEMATOCRIT % 30.5*   PLATELETS Thousands/uL 55*   SEGS PCT % 60   LYMPHO PCT % 27   MONO PCT % 7   EOS PCT % 5     Results from last 7 days   Lab Units 02/12/25  0512 02/11/25  0619   SODIUM mmol/L 139 140   POTASSIUM mmol/L 3.0* 3.0*   CHLORIDE mmol/L 96 97   CO2 mmol/L 38* 37*   BUN mg/dL 11 10   CREATININE mg/dL 0.88 0.93   ANION GAP mmol/L 5 6   CALCIUM mg/dL 8.3* 8.4   ALBUMIN g/dL  --  3.1*   TOTAL BILIRUBIN mg/dL  --  1.85*   ALK PHOS U/L  --  209*   ALT U/L  --  17   AST U/L  --  39   GLUCOSE RANDOM mg/dL 98 100                       Recent Cultures (last 7 days):          Imaging Results Review: No pertinent imaging studies reviewed.  Other Study Results Review: EKG was reviewed.     Last 24 Hours Medication List:     Current Facility-Administered Medications:     acetaminophen (TYLENOL) tablet 488 mg, Q4H PRN    amitriptyline (ELAVIL) tablet 75 mg, HS    cyclobenzaprine (FLEXERIL) tablet 10 mg, TID PRN    dapsone tablet 50 mg, Daily    folic acid (FOLVITE) tablet 1 mg, Daily    furosemide (LASIX) 500 mg infusion 50 mL, Continuous, Last Rate: 15 mg/hr (02/12/25 0814)    lactulose (CHRONULAC) oral solution 30 g, 4x Daily    lidocaine (LIDODERM) 5 % patch 1 patch, Daily    midodrine (PROAMATINE) tablet 12.5 mg, TID AC    nystatin (MYCOSTATIN) powder, BID    oxyCODONE (ROXICODONE) immediate release tablet 10 mg, Q6H PRN    polyethylene glycol (MIRALAX) packet 17 g, Daily    potassium chloride (Klor-Con M20) CR tablet 40 mEq, TID With Meals    potassium chloride 20 mEq IVPB (premix), Once    rifaximin (XIFAXAN) tablet 550 mg, Q12H MIGUEL    sertraline (ZOLOFT) tablet 100 mg, Daily    trimethobenzamide (TIGAN) IM injection 200 mg, Q6H PRN    ursodiol (ACTIGALL) capsule 300 mg, BID    Administrative Statements   Today, Patient Was Seen By: Idalia Stuart MD      **Please Note: This note may have been constructed using a voice recognition system.**

## 2025-02-12 NOTE — PLAN OF CARE
Problem: PAIN - ADULT  Goal: Verbalizes/displays adequate comfort level or baseline comfort level  Description: Interventions:  - Encourage patient to monitor pain and request assistance  - Assess pain using appropriate pain scale  - Administer analgesics based on type and severity of pain and evaluate response  - Implement non-pharmacological measures as appropriate and evaluate response  - Consider cultural and social influences on pain and pain management  - Notify physician/advanced practitioner if interventions unsuccessful or patient reports new pain  Outcome: Progressing     Problem: INFECTION - ADULT  Goal: Absence or prevention of progression during hospitalization  Description: INTERVENTIONS:  - Assess and monitor for signs and symptoms of infection  - Monitor lab/diagnostic results  - Monitor all insertion sites, i.e. indwelling lines, tubes, and drains  - Monitor endotracheal if appropriate and nasal secretions for changes in amount and color  - [place] appropriate cooling/warming therapies per order  - Administer medications as ordered  - Instruct and encourage patient and family to use good hand hygiene technique  - Identify and instruct in appropriate isolation precautions for identified infection/condition  Outcome: Progressing  Goal: Absence of fever/infection during neutropenic period  Description: INTERVENTIONS:  - Monitor WBC    Outcome: Progressing

## 2025-02-12 NOTE — PLAN OF CARE
Problem: PAIN - ADULT  Goal: Verbalizes/displays adequate comfort level or baseline comfort level  Description: Interventions:  - Encourage patient to monitor pain and request assistance  - Assess pain using appropriate pain scale  - Administer analgesics based on type and severity of pain and evaluate response  - Implement non-pharmacological measures as appropriate and evaluate response  - Consider cultural and social influences on pain and pain management  - Notify physician/advanced practitioner if interventions unsuccessful or patient reports new pain  Outcome: Progressing     Problem: INFECTION - ADULT  Goal: Absence or prevention of progression during hospitalization  Description: INTERVENTIONS:  - Assess and monitor for signs and symptoms of infection  - Monitor lab/diagnostic results  - Monitor all insertion sites, i.e. indwelling lines, tubes, and drains  - Monitor endotracheal if appropriate and nasal secretions for changes in amount and color  - [place] appropriate cooling/warming therapies per order  - Administer medications as ordered  - Instruct and encourage patient and family to use good hand hygiene technique  - Identify and instruct in appropriate isolation precautions for identified infection/condition  Outcome: Progressing  Goal: Absence of fever/infection during neutropenic period  Description: INTERVENTIONS:  - Monitor WBC    Outcome: Progressing     Problem: SAFETY ADULT  Goal: Patient will remain free of falls  Description: INTERVENTIONS:  - Educate patient/family on patient safety including physical limitations  - Instruct patient to call for assistance with activity   - Consult OT/PT to assist with strengthening/mobility   - Keep Call bell within reach  - Keep bed low and locked with side rails adjusted as appropriate  - Keep care items and personal belongings within reach  - Initiate and maintain comfort rounds  - Make Fall Risk Sign visible to staff  - Offer Toileting every 2 Hours,  in advance of need  - Initiate/Maintain alarm  - Obtain necessary fall risk management equipment:   - Apply yellow socks and bracelet for high fall risk patients  - Consider moving patient to room near nurses station  Outcome: Progressing  Goal: Maintain or return to baseline ADL function  Description: INTERVENTIONS:  -  Assess patient's ability to carry out ADLs; assess patient's baseline for ADL function and identify physical deficits which impact ability to perform ADLs (bathing, care of mouth/teeth, toileting, grooming, dressing, etc.)  - Assess/evaluate cause of self-care deficits   - Assess range of motion  - Assess patient's mobility; develop plan if impaired  - Assess patient's need for assistive devices and provide as appropriate  - Encourage maximum independence but intervene and supervise when necessary  - Involve family in performance of ADLs  - Assess for home care needs following discharge   - Consider OT consult to assist with ADL evaluation and planning for discharge  - Provide patient education as appropriate  Outcome: Progressing  Goal: Maintains/Returns to pre admission functional level  Description: INTERVENTIONS:  - Perform AM-PAC 6 Click Basic Mobility/ Daily Activity assessment daily.  - Set and communicate daily mobility goal to care team and patient/family/caregiver.   - Collaborate with rehabilitation services on mobility goals if consulted  - Stand patient 3 times a day  - Ambulate patient 3 times a day  - Out of bed to chair 3 times a day   - Out of bed for meals 3 times a day  - Out of bed for toileting  - Record patient progress and toleration of activity level   Outcome: Progressing

## 2025-02-12 NOTE — UTILIZATION REVIEW
Continued Stay Review    Date: 2/12/25                             Current Patient Class: Inpatient  Current Level of Care: MS    HPI:54 y.o. female initially admitted on 2/3/25    Current Diagnosis: Liver cirrhosis secondary to HYATT (nonalcoholic steatohepatitis) / Hypokalemia / Primary hypertension     Assessment/Plan:   2/12/25: Patient denies any chest pain or shortness of breath. Blood pressures are stable on midodrine . I/O net -1331; B/L LE edema; K 3.0  Plan: cont lasix gtt; recd KCL iv x2; monitor labs; Midodrine for diureses increase to 12.5 mg po tid       Medications:   Scheduled Medications:  amitriptyline, 75 mg, Oral, HS  dapsone, 50 mg, Oral, Daily  folic acid, 1 mg, Oral, Daily  lactulose, 30 g, Oral, 4x Daily  lidocaine, 1 patch, Topical, Daily  midodrine, 12.5 mg, Oral, TID AC  nystatin, , Topical, BID  polyethylene glycol, 17 g, Oral, Daily  potassium chloride, 40 mEq, Oral, TID With Meals  potassium chloride, 20 mEq, Intravenous  (2/12 recd x2)    rifaximin, 550 mg, Oral, Q12H MIGUEL  sertraline, 100 mg, Oral, Daily  ursodiol, 300 mg, Oral, BID      Continuous IV Infusions:  furosemide, 15 mg/hr, Intravenous, Continuous      PRN Meds:  acetaminophen, 488 mg, Oral, Q4H PRN  cyclobenzaprine, 10 mg, Oral, TID PRN  oxyCODONE, 10 mg, Oral, Q6H PRN  trimethobenzamide, 200 mg, Intramuscular, Q6H PRN      Discharge Plan: home with [facility]    Vital Signs (last 3 days)       Date/Time Temp Pulse Resp BP MAP (mmHg) SpO2 O2 Device Patient Position - Orthostatic VS Iris Coma Scale Score Pain    02/12/25 0807 -- -- -- -- -- -- -- -- -- 7    02/12/25 0724 -- -- -- -- -- -- -- -- 15 --    02/12/25 07:20:30 98.1 °F (36.7 °C) 79 18 117/64 82 95 % None (Room air) -- -- --    02/12/25 06:30:28 -- 79 -- 99/56 70 91 % -- -- -- --    02/11/25 22:52:23 99 °F (37.2 °C) 87 17 130/75 93 90 % None (Room air) Lying -- --    02/11/25 2015 -- -- -- -- -- 98 % None (Room air) -- -- No Pain    02/11/25 1751 -- -- -- -- -- -- -- --  -- 8    02/11/25 1549 -- -- -- -- -- -- -- -- -- 5    02/11/25 15:29:23 -- 90 18 151/80 104 100 % None (Room air) Lying -- --    02/11/25 10:57:53 -- 92 16 158/78 105 100 % None (Room air) -- -- --    02/11/25 1003 -- -- -- -- -- -- -- -- 15 --    02/11/25 1000 -- -- -- -- -- -- -- -- -- 7    02/11/25 07:36:42 98.5 °F (36.9 °C) 83 18 138/75 96 92 % -- Lying -- --    02/11/25 06:02:11 -- 82 -- 116/72 87 95 % -- -- -- --    02/10/25 22:59:41 98.6 °F (37 °C) 87 17 121/74 90 91 % -- -- -- --    02/10/25 2024 -- -- -- -- -- -- -- -- -- 7    02/10/25 2020 -- -- -- -- -- -- -- -- 15 --    02/10/25 15:14:25 99.6 °F (37.6 °C) 98 18 150/86 107 95 % -- -- -- --    02/10/25 1259 -- -- -- 126/67 -- -- -- -- -- --    02/10/25 0851 -- -- -- -- -- -- -- -- 15 No Pain    02/10/25 07:36:56 98.5 °F (36.9 °C) 83 18 121/67 85 94 % -- -- -- --    02/10/25 06:26:49 -- 87 -- 145/88 107 97 % -- -- -- --    02/09/25 22:45:06 -- -- -- 115/71 86 -- -- -- -- --    02/09/25 2110 -- -- -- -- -- -- -- -- -- 6    02/09/25 2040 -- -- -- -- -- -- -- -- 15 No Pain    02/09/25 1544 -- -- -- -- -- -- -- -- -- 5    02/09/25 15:13:57 98.3 °F (36.8 °C) 93 18 121/66 84 97 % -- Lying -- --    02/09/25 0811 -- -- -- -- -- -- -- -- -- 5    02/09/25 0722 -- 80 -- 106/53 -- -- -- -- -- --    02/09/25 0719 -- -- -- -- -- -- -- -- 15 No Pain    02/09/25 07:17:16 -- 85 -- 108/59 75 92 % -- -- -- --    02/09/25 0700 98.6 °F (37 °C) 84 18 100/48 65 91 % None (Room air) Lying -- --          Weight (last 2 days)       Date/Time Weight    02/12/25 0600 127 (279.01)    02/12/25 0448 127 (279.01)    02/11/25 0600 127 (280.8)    02/10/25 0600 128 (283.2)            Pertinent Labs/Diagnostic Results:     Results from last 7 days   Lab Units 02/12/25  0512 02/11/25  0619 02/10/25  0631 02/09/25  1813 02/09/25  0806 02/09/25  0541 02/08/25  0804 02/08/25  0556 02/07/25  1412 02/07/25  0806   SODIUM mmol/L 139 140 139 138 138  --    < > 138  --  139   POTASSIUM mmol/L 3.0*  3.0* 3.5 3.7 3.1*  --    < > 3.3*   < > 2.9*   CHLORIDE mmol/L 96 97 97 97 95*  --    < > 98  --  96   CO2 mmol/L 38* 37* 36* 36* 39*  --    < > 35*  --  39*   ANION GAP mmol/L 5 6 6 5 4  --    < > 5  --  4   BUN mg/dL 11 10 10 10 9  --    < > 9  --  9   CREATININE mg/dL 0.88 0.93 0.92 1.06 1.04  --    < > 0.87  --  0.98   EGFR ml/min/1.73sq m 74 69 70 59 60  --    < > 75  --  65   CALCIUM mg/dL 8.3* 8.4 8.2* 8.4 8.2*  --    < > 7.9*  --  7.7*   MAGNESIUM mg/dL  --  1.9 1.7*  --   --  1.9  --  1.6*  --  1.5*    < > = values in this interval not displayed.     Results from last 7 days   Lab Units 02/12/25  0512 02/11/25  0619 02/10/25  0631 02/06/25  0448   AST U/L  --  39 43* 39   ALT U/L  --  17 17 23   ALK PHOS U/L  --  209* 202* 210*   TOTAL PROTEIN g/dL  --  6.9 6.6 5.6*   ALBUMIN g/dL  --  3.1* 3.1* 2.6*   TOTAL BILIRUBIN mg/dL  --  1.85* 2.19* 3.31*   AMMONIA umol/L 77*  --   --   --         Results from last 7 days   Lab Units 02/12/25  0512 02/11/25  0619 02/10/25  0631 02/09/25  1813 02/09/25  0806 02/08/25 2028 02/08/25  0804 02/08/25  0556 02/07/25  0806 02/06/25 2019 02/06/25  0448   GLUCOSE RANDOM mg/dL 98 100 98 189* 109 113 103 91 106 92 101        Results from last 7 days   Lab Units 02/09/25  0541   PH RAMYA  7.419*   PCO2 RAMYA mm Hg 51.3*   PO2 RAMYA mm Hg 36.0   HCO3 RAMYA mmol/L 32.5*   BASE EXC RAMYA mmol/L 6.9   O2 CONTENT RAMYA ml/dL 9.1   O2 HGB, VENOUS % 62.4           Results from last 7 days   Lab Units 02/06/25  0637   UNIT PRODUCT CODE  [id number]  [id number]   UNIT NUMBER  [id number]  [id number]   UNITABO  A  A   UNITRH  POS  POS   CROSSMATCH  Compatible  Compatible   UNIT DISPENSE STATUS  Presumed Trans  Presumed Trans   UNIT PRODUCT VOL ml 300  300             Network Utilization Review Department  ATTENTION: Please call with any questions or concerns to [phone] and carefully listen to the prompts so that you are directed to the right person. All voicemails are confidential.    For Discharge needs, contact Care Management DC Support Team at [phone] opt. 2  Send all requests for admission clinical reviews, approved or denied determinations and any other requests to dedicated fax number below belonging to the campus where the patient is receiving treatment. List of dedicated fax numbers for the Facilities:  FACILITY NAME UR FAX NUMBER   ADMISSION DENIALS (Administrative/Medical Necessity) [phone]   DISCHARGE SUPPORT TEAM (NETWORK) [phone]   PARENT CHILD HEALTH (Maternity/NICU/Pediatrics) [phone]   [facility] [phone]   General acute hospital [phone]   [facility] [phone]   [facility] [phone]   [facility] [phone]   [facility] [phone]   [facility] [phone]   [facility] [phone]   [facility] [phone]   [facility] [phone]   [facility] [phone]   [facility] [phone]

## 2025-02-12 NOTE — ASSESSMENT & PLAN NOTE
History of HYATT cirrhosis.  Recent EGD without evidence of varices  Home regimen: rifaximin, lactulose, carvedilol, and torsemide. States she has not been taking spironolactone.   - Of note: patient did not sound confident in endorsing adherence to her diuretic regimen.  Decompensated with anasarca  Ultrasound shows trace ascites  Continue Lasix at 15 mg/h .so far lost 22 lbs  Daily weight   2decho- nml systolic and diastolic function done 10/2024  Midodrine for diureses increase to 12.5 mg po tid

## 2025-02-13 VITALS
OXYGEN SATURATION: 92 % | DIASTOLIC BLOOD PRESSURE: 61 MMHG | HEIGHT: 65 IN | WEIGHT: 272.27 LBS | TEMPERATURE: 98.5 F | HEART RATE: 78 BPM | RESPIRATION RATE: 17 BRPM | BODY MASS INDEX: 45.36 KG/M2 | SYSTOLIC BLOOD PRESSURE: 119 MMHG

## 2025-02-13 LAB
ANION GAP SERPL CALCULATED.3IONS-SCNC: 6 MMOL/L (ref 4–13)
BUN SERPL-MCNC: 11 MG/DL (ref 5–25)
CALCIUM SERPL-MCNC: 8.2 MG/DL (ref 8.4–10.2)
CHLORIDE SERPL-SCNC: 98 MMOL/L (ref 96–108)
CO2 SERPL-SCNC: 36 MMOL/L (ref 21–32)
CREAT SERPL-MCNC: 0.85 MG/DL (ref 0.6–1.3)
GFR SERPL CREATININE-BSD FRML MDRD: 77 ML/MIN/1.73SQ M
GLUCOSE SERPL-MCNC: 102 MG/DL (ref 65–140)
HEMOCCULT STL QL: NEGATIVE
HEMOCCULT STL QL: NORMAL
HEMOCCULT STL QL: NORMAL
MAGNESIUM SERPL-MCNC: 1.7 MG/DL (ref 1.9–2.7)
POTASSIUM SERPL-SCNC: 2.9 MMOL/L (ref 3.5–5.3)
SODIUM SERPL-SCNC: 140 MMOL/L (ref 135–147)

## 2025-02-13 PROCEDURE — 99239 HOSP IP/OBS DSCHRG MGMT >30: CPT | Performed by: FAMILY MEDICINE

## 2025-02-13 PROCEDURE — 80048 BASIC METABOLIC PNL TOTAL CA: CPT | Performed by: FAMILY MEDICINE

## 2025-02-13 PROCEDURE — 83735 ASSAY OF MAGNESIUM: CPT | Performed by: FAMILY MEDICINE

## 2025-02-13 RX ORDER — LACTULOSE 10 G/15ML
30 SOLUTION ORAL 4 TIMES DAILY
Qty: 240 ML | Refills: 0 | Status: SHIPPED | OUTPATIENT
Start: 2025-02-13

## 2025-02-13 RX ORDER — ATENOLOL 25 MG/1
25 TABLET ORAL DAILY
Qty: 30 TABLET | Refills: 0 | Status: SHIPPED | OUTPATIENT
Start: 2025-02-13

## 2025-02-13 RX ORDER — POTASSIUM CHLORIDE 1500 MG/1
40 TABLET, EXTENDED RELEASE ORAL DAILY
Qty: 60 TABLET | Refills: 0 | Status: SHIPPED | OUTPATIENT
Start: 2025-02-13 | End: 2025-03-15

## 2025-02-13 RX ORDER — TORSEMIDE 20 MG/1
20 TABLET ORAL 2 TIMES DAILY
Qty: 60 TABLET | Refills: 0 | Status: SHIPPED | OUTPATIENT
Start: 2025-02-13 | End: 2025-03-15

## 2025-02-13 RX ORDER — PANTOPRAZOLE SODIUM 40 MG/1
40 TABLET, DELAYED RELEASE ORAL
Qty: 30 TABLET | Refills: 0 | Status: SHIPPED | OUTPATIENT
Start: 2025-02-13

## 2025-02-13 RX ORDER — MIDODRINE HYDROCHLORIDE 2.5 MG/1
10 TABLET ORAL
Qty: 360 TABLET | Refills: 0 | Status: SHIPPED | OUTPATIENT
Start: 2025-02-13

## 2025-02-13 RX ORDER — SPIRONOLACTONE 25 MG/1
25 TABLET ORAL DAILY
Qty: 30 TABLET | Refills: 0 | Status: SHIPPED | OUTPATIENT
Start: 2025-02-13 | End: 2025-03-15

## 2025-02-13 RX ORDER — POTASSIUM CHLORIDE 14.9 MG/ML
20 INJECTION INTRAVENOUS
Status: COMPLETED | OUTPATIENT
Start: 2025-02-13 | End: 2025-02-13

## 2025-02-13 RX ADMIN — RIFAXIMIN 550 MG: 550 TABLET ORAL at 09:05

## 2025-02-13 RX ADMIN — OXYCODONE HYDROCHLORIDE 10 MG: 10 TABLET ORAL at 09:08

## 2025-02-13 RX ADMIN — POTASSIUM CHLORIDE 40 MEQ: 1500 TABLET, EXTENDED RELEASE ORAL at 11:39

## 2025-02-13 RX ADMIN — FOLIC ACID 1 MG: 1 TABLET ORAL at 09:05

## 2025-02-13 RX ADMIN — POTASSIUM CHLORIDE 40 MEQ: 1500 TABLET, EXTENDED RELEASE ORAL at 09:08

## 2025-02-13 RX ADMIN — MIDODRINE HYDROCHLORIDE 12.5 MG: 5 TABLET ORAL at 11:39

## 2025-02-13 RX ADMIN — DAPSONE 50 MG: 25 TABLET ORAL at 09:04

## 2025-02-13 RX ADMIN — LACTULOSE 30 G: 20 SOLUTION ORAL at 11:42

## 2025-02-13 RX ADMIN — POTASSIUM CHLORIDE 20 MEQ: 200 INJECTION, SOLUTION INTRAVENOUS at 11:41

## 2025-02-13 RX ADMIN — LACTULOSE 30 G: 20 SOLUTION ORAL at 09:05

## 2025-02-13 RX ADMIN — URSODIOL 300 MG: 300 CAPSULE ORAL at 09:05

## 2025-02-13 RX ADMIN — POTASSIUM CHLORIDE 20 MEQ: 200 INJECTION, SOLUTION INTRAVENOUS at 09:03

## 2025-02-13 RX ADMIN — NYSTATIN 1 APPLICATION: 100000 POWDER TOPICAL at 09:06

## 2025-02-13 RX ADMIN — MIDODRINE HYDROCHLORIDE 12.5 MG: 5 TABLET ORAL at 09:08

## 2025-02-13 RX ADMIN — SERTRALINE 100 MG: 100 TABLET, FILM COATED ORAL at 09:05

## 2025-02-13 RX ADMIN — CYCLOBENZAPRINE 10 MG: 10 TABLET, FILM COATED ORAL at 09:08

## 2025-02-13 NOTE — ASSESSMENT & PLAN NOTE
Body mass index is 45.31 kg/m².  Recommend incorporating a more whole foods plant-predominant diet along with decreasing consumption of red meats and processed foods  Per AHA guidelines, recommend moderate-vigorous intensity exercise for 30 minutes a day for 5 days a week or a total of 150 min/week

## 2025-02-13 NOTE — ASSESSMENT & PLAN NOTE
Hemoglobin 8.1 on arrival.now 6.6 at this time she denies hematochezia or melena as opposed to her recent hospitalization unclear what her baseline is after recent rectal bleed but she was discharged with 8.1 from [place].  Previous appeared to be between 9 and 10.  No current evidence of blood loss.  Continue to monitor.  Occult negative  She is status post 2 units of PRBC transfusion and 2/5-hemoglobin improved to 9.1 now  discussed with GI after evaluation they are not planning to scope with a EGD or colonoscopy.  As there is no active bleeding suspect this could be dilutional also she has liver cirrhosis could be nutritional aspect with iron deficiency as well on initial arrival

## 2025-02-13 NOTE — PLAN OF CARE
Problem: PAIN - ADULT  Goal: Verbalizes/displays adequate comfort level or baseline comfort level  Description: Interventions:  - Encourage patient to monitor pain and request assistance  - Assess pain using appropriate pain scale  - Administer analgesics based on type and severity of pain and evaluate response  - Implement non-pharmacological measures as appropriate and evaluate response  - Consider cultural and social influences on pain and pain management  - Notify physician/advanced practitioner if interventions unsuccessful or patient reports new pain  Outcome: Progressing     Problem: INFECTION - ADULT  Goal: Absence or prevention of progression during hospitalization  Description: INTERVENTIONS:  - Assess and monitor for signs and symptoms of infection  - Monitor lab/diagnostic results  - Monitor all insertion sites, i.e. indwelling lines, tubes, and drains  - Monitor endotracheal if appropriate and nasal secretions for changes in amount and color  - [place] appropriate cooling/warming therapies per order  - Administer medications as ordered  - Instruct and encourage patient and family to use good hand hygiene technique  - Identify and instruct in appropriate isolation precautions for identified infection/condition  Outcome: Progressing  Goal: Absence of fever/infection during neutropenic period  Description: INTERVENTIONS:  - Monitor WBC    Outcome: Progressing     Problem: SAFETY ADULT  Goal: Patient will remain free of falls  Description: INTERVENTIONS:  - Educate patient/family on patient safety including physical limitations  - Instruct patient to call for assistance with activity   - Consult OT/PT to assist with strengthening/mobility   - Keep Call bell within reach  - Keep bed low and locked with side rails adjusted as appropriate  - Keep care items and personal belongings within reach  - Initiate and maintain comfort rounds  - Make Fall Risk Sign visible to staff  - Offer Toileting every 2 Hours,  in advance of need  - Initiate/Maintain alarm  - Obtain necessary fall risk management equipment: walker, bsc  - Apply yellow socks and bracelet for high fall risk patients  - Consider moving patient to room near nurses station  Outcome: Progressing  Goal: Maintain or return to baseline ADL function  Description: INTERVENTIONS:  -  Assess patient's ability to carry out ADLs; assess patient's baseline for ADL function and identify physical deficits which impact ability to perform ADLs (bathing, care of mouth/teeth, toileting, grooming, dressing, etc.)  - Assess/evaluate cause of self-care deficits   - Assess range of motion  - Assess patient's mobility; develop plan if impaired  - Assess patient's need for assistive devices and provide as appropriate  - Encourage maximum independence but intervene and supervise when necessary  - Involve family in performance of ADLs  - Assess for home care needs following discharge   - Consider OT consult to assist with ADL evaluation and planning for discharge  - Provide patient education as appropriate  Outcome: Progressing  Goal: Maintains/Returns to pre admission functional level  Description: INTERVENTIONS:  - Perform AM-PAC 6 Click Basic Mobility/ Daily Activity assessment daily.  - Set and communicate daily mobility goal to care team and patient/family/caregiver.   - Collaborate with rehabilitation services on mobility goals if consulted  - Perform Range of Motion 3 times a day.  - Reposition patient every 2 hours.  - Dangle patient 3 times a day  - Stand patient 3 times a day  - Ambulate patient 3 times a day  - Out of bed to chair 3 times a day   - Out of bed for meals 3 times a day  - Out of bed for toileting  - Record patient progress and toleration of activity level   Outcome: Progressing     Problem: DISCHARGE PLANNING  Goal: Discharge to home or other facility with appropriate resources  Description: INTERVENTIONS:  - Identify barriers to discharge w/patient and  caregiver  - Arrange for needed discharge resources and transportation as appropriate  - Identify discharge learning needs (meds, wound care, etc.)  - Arrange for interpretive services to assist at discharge as needed  - Refer to Case Management Department for coordinating discharge planning if the patient needs post-hospital services based on physician/advanced practitioner order or complex needs related to functional status, cognitive ability, or social support system  Outcome: Progressing     Problem: Knowledge Deficit  Goal: Patient/family/caregiver demonstrates understanding of disease process, treatment plan, medications, and discharge instructions  Description: Complete learning assessment and assess knowledge base.  Interventions:  - Provide teaching at level of understanding  - Provide teaching via preferred learning methods  Outcome: Progressing

## 2025-02-13 NOTE — ASSESSMENT & PLAN NOTE
History of HYATT cirrhosis.  Recent EGD without evidence of varices  Home regimen: rifaximin, lactulose, carvedilol, and torsemide. States she has not been taking spironolactone.   - Of note: patient did not sound confident in endorsing adherence to her diuretic regimen.  Decompensated with anasarca  Ultrasound shows trace ascites  Continue Lasix at 15 mg/h .so far lost 28 lbs. now transition to oral torsemide  Daily weight   2decho- nml systolic and diastolic function done 10/2024

## 2025-02-13 NOTE — DISCHARGE SUMMARY
Discharge Summary - Hospitalist   Name: Bailey Olsen 54 y.o. female I MRN: [id number]  Unit/Bed#: -01 I Date of Admission: 2/3/2025   Date of Service: 2/13/2025 I Hospital Day: 10     Assessment & Plan  Liver cirrhosis secondary to HYATT (nonalcoholic steatohepatitis) (HCC)  History of HYATT cirrhosis.  Recent EGD without evidence of varices  Home regimen: rifaximin, lactulose, carvedilol, and torsemide. States she has not been taking spironolactone.   - Of note: patient did not sound confident in endorsing adherence to her diuretic regimen.  Decompensated with anasarca  Ultrasound shows trace ascites  Continue Lasix at 15 mg/h .so far lost 28 lbs. now transition to oral torsemide  Daily weight   2decho- nml systolic and diastolic function done 10/2024    Hypokalemia  cont kcl to 40 meq po tid. Also additional 40 meq iv kcl for today   Iron deficiency anemia  Hemoglobin 8.1 on arrival.now 6.6 at this time she denies hematochezia or melena as opposed to her recent hospitalization unclear what her baseline is after recent rectal bleed but she was discharged with 8.1 from [place].  Previous appeared to be between 9 and 10.  No current evidence of blood loss.  Continue to monitor.  Occult negative  She is status post 2 units of PRBC transfusion and 2/5-hemoglobin improved to 9.1 now  discussed with GI after evaluation they are not planning to scope with a EGD or colonoscopy.  As there is no active bleeding suspect this could be dilutional also she has liver cirrhosis could be nutritional aspect with iron deficiency as well on initial arrival     Fibromyalgia  History of chronic, diffuse pain. Will continue home regimen.  Chronic, continuous use of opioids  Continue home pain regimen.oxycodone prn   Opioid dependence in the setting of chronic pain, POA, evidenced by outpatient script for oxycodone 10mg q6h prn and continued while IP   Class 3 severe obesity due to excess calories in adult (HCC)  Body mass index  is 45.31 kg/m².  Recommend incorporating a more whole foods plant-predominant diet along with decreasing consumption of red meats and processed foods  Per AHA guidelines, recommend moderate-vigorous intensity exercise for 30 minutes a day for 5 days a week or a total of 150 min/week   Primary hypertension  Blood pressures are stable on midodrine . midodrine has been started to allow diuresis Coreg for now has been discontinued and will be held  Pancytopenia (HCC)  Secondary to liver cirrhosis platelets-dropped down below 70,000 again.  Discontinued heparin at this time 2/7     Medical Problems       Resolved Problems  Date Reviewed: 2/10/2025          Resolved    Anasarca 2/4/2025     Resolved by  Maryjane Whatley MD          MESSAGE TO PCP (Nayely Brown DO) FOR FOLLOW UP:   Thank you for allowing us to participate in the care of your patient, Bailey Olsen, who was hospitalized from 2/3/2025 through 02/13/25 with the admitting diagnosis of .  Anasarca/decompensated liver cirrhosis    Medication Changes:  None  Outpatient testing recommended:  Outpatient follow-up with PCP and liver specialist  If you have any additional questions or would like to discuss further, please feel free to contact me.  Idalia Stuart MD  [facility] Internal Medicine, Hospitalist, [phone]     Admission Date:   Admission Orders (From admission, onward)       Ordered        02/03/25 1943  INPATIENT ADMISSION  Once                          Discharge Date: 02/13/25    Consultations During Hospital Stay:  gi    Procedures Performed:   none    Significant Findings / Test Results:   US abdomen limited  Result Date: 2/5/2025  Impression: Trace ascites. Workstation performed: [id number]     XR chest 1 view portable  Result Date: 2/3/2025  Impression: Mild central pulmonary vascular prominence. No suspicious infiltrates are seen. Workstation performed: [id number]      Incidental Findings:       Test Results Pending at Discharge (will  "require follow up):   none     Complications:  none    Reason for Admission: Decompensated liver cirrhosis and anasarca    Hospital Course:   Bailey Olsen is a 54 y.o. female patient who originally presented to the hospital on 2/3/2025 due to decompensated liver cirrhosis and anasarca.  Patient was given aggressive diuresis with Lasix drip and is lost around 28 pounds.  Ammonia level is stable she is having bowel movements and is cognitively intact.  No bleeding noted during this admission recommended that she must be compliant with her medications and follow-up outpatient with PCP and GI doctor      Please see above list of diagnoses and related plan for additional information.     Condition at Discharge: good    Discharge Day Visit / Exam:   Subjective: Patient denies any chest pain or shortness of breath or abdominal pain  Vitals: Blood Pressure: 116/65 (02/13/25 0714)  Pulse: 84 (02/13/25 0714)  Temperature: 98.5 °F (36.9 °C) (02/13/25 0714)  Temp Source: Oral (02/11/25 2252)  Respirations: 17 (02/13/25 0714)  Height: 5' 5\" (165.1 cm) (02/03/25 2010)  Weight - Scale: 124 kg (272 lb 4.3 oz) (02/13/25 0600)  SpO2: 92 % (02/13/25 0714)  Physical Exam  Vitals and nursing note reviewed.   Constitutional:       Appearance: Normal appearance. She is obese.   HENT:      Head: Normocephalic and atraumatic.      Right Ear: External ear normal.      Left Ear: External ear normal.      Nose: Nose normal.      Mouth/Throat:      Pharynx: Oropharynx is clear.   Cardiovascular:      Rate and Rhythm: Normal rate and regular rhythm.      Heart sounds: Normal heart sounds.   Pulmonary:      Effort: Pulmonary effort is normal.      Breath sounds: Normal breath sounds.   Abdominal:      General: Bowel sounds are normal.      Palpations: Abdomen is soft.      Tenderness: There is no abdominal tenderness.   Musculoskeletal:         General: Normal range of motion.      Cervical back: Normal range of motion and neck supple. "   Skin:     General: Skin is warm and dry.      Capillary Refill: Capillary refill takes less than 2 seconds.   Neurological:      General: No focal deficit present.      Mental Status: She is alert and oriented to person, place, and time.   Psychiatric:         Mood and Affect: Mood normal.            Discussion with Family: Updated spouse    Discharge instructions/Information to patient and family:   See after visit summary for information provided to patient and family.      Provisions for Follow-Up Care:  See after visit summary for information related to follow-up care and any pertinent home health orders.      Mobility at time of Discharge:   Basic Mobility Inpatient Raw Score: 22  JH-HLM Goal: 7: Walk 25 feet or more  JH-HLM Achieved: 6: Walk 10 steps or more  HLM Goal achieved. Continue to encourage appropriate mobility.     Disposition:   Home with [facility]NA Services (Reminder: Complete face to face encounter)    Planned Readmission: none    Discharge Medications:  See after visit summary for reconciled discharge medications provided to patient and/or family.      Administrative Statements   Discharge Statement:  I have spent a total time of 35 minutes in caring for this patient on the day of the visit/encounter. .    **Please Note: This note may have been constructed using a voice recognition system**

## 2025-02-13 NOTE — NURSING NOTE
Patient discharged home today; IV removed intact; AVS printed and reviewed with patient, patient verbalized understanding; patient transported to exit in wheelchair accompanied by this RN.

## 2025-02-13 NOTE — CASE MANAGEMENT
Case Management Discharge Planning Note    Patient name Bailey Olsen  Location /-01 MRN [id number]  : 1970 Date 2025       Current Admission Date: 2/3/2025  Current Admission Diagnosis:Liver cirrhosis secondary to HYATT (nonalcoholic steatohepatitis) (HCC)   Patient Active Problem List    Diagnosis Date Noted Date Diagnosed    Upper GI bleed 2025     Acute anemia 2025     Rectal bleeding 2025     Acute blood loss anemia 2025     Chronic, continuous use of opioids 2025     Acute metabolic encephalopathy 2025     SIRS (systemic inflammatory response syndrome) ([facility]) 2025     Influenza B 2024     Sacral fracture, closed ([facility]) 11/15/2024     KISHORE (acute kidney injury) ([facility]) 10/04/2024     Morbid obesity with BMI of 40.0-44.9, adult ([facility]) 10/04/2024     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) 2024     Fall from standing 2024     Closed compression fracture of L1 lumbar vertebra, sequela 2024     Closed stable burst fracture of first lumbar vertebra with routine healing 2024     Groin hematoma 2024     Ambulatory dysfunction 2024     Intractable back pain 2024     Hyperammonemia (HCC) 2024     Hepatic encephalopathy (HCC) 2024     Fall 2024     Liver cirrhosis secondary to HYATT (nonalcoholic steatohepatitis) (HCC) 2024     History of malignant carcinoid tumor of small intestine 2024     Class 3 severe obesity due to excess calories in adult (HCC) 2024     Primary hypertension 2024     Hypokalemia 2024     Fibromyalgia 2024     Iron deficiency anemia 2024     Pancytopenia (HCC) 2024     Sarcoidosis 2024     Bilateral pulmonary infiltrates 2024       LOS (days): 10  Geometric Mean LOS (GMLOS) (days): 3.2  Days to GMLOS:-6.5     OBJECTIVE:  Risk of Unplanned Readmission Score: 63.29         Current  admission status: Inpatient   Preferred Pharmacy:   [id number] - [place] - [address] [facility]  [address] [address]  Phone: [phone] Fax: [phone]    [id number] - [place] - [address] [facility] [facility]  [address] [place] [address]  Phone: [phone] Fax: [phone]    Primary Care Provider: Nayely Brown DO    Primary Insurance: BLUE CROSS  Secondary Insurance: MEDICARE    DISCHARGE DETAILS:    Discharge planning discussed with:: patient   Patient is medically cleared for discharge, [facility] aware pt is discharging home today. HHC order uploaded in aidin.                                   Other Referral/Resources/Interventions Provided:  Interventions: C         Treatment Team Recommendation: Home with Home Health Care  Discharge Destination Plan:: Home with Home Health Care  Transport at Discharge : Family

## 2025-02-13 NOTE — PLAN OF CARE
Problem: PAIN - ADULT  Goal: Verbalizes/displays adequate comfort level or baseline comfort level  Description: Interventions:  - Encourage patient to monitor pain and request assistance  - Assess pain using appropriate pain scale  - Administer analgesics based on type and severity of pain and evaluate response  - Implement non-pharmacological measures as appropriate and evaluate response  - Consider cultural and social influences on pain and pain management  - Notify physician/advanced practitioner if interventions unsuccessful or patient reports new pain  Outcome: Progressing     Problem: SAFETY ADULT  Goal: Patient will remain free of falls  Description: INTERVENTIONS:  - Educate patient/family on patient safety including physical limitations  - Instruct patient to call for assistance with activity   - Consult OT/PT to assist with strengthening/mobility   - Keep Call bell within reach  - Keep bed low and locked with side rails adjusted as appropriate  - Keep care items and personal belongings within reach  - Initiate and maintain comfort rounds  - Make Fall Risk Sign visible to staff    - Apply yellow socks and bracelet for high fall risk patients  - Consider moving patient to room near nurses station  Outcome: Progressing     Problem: DISCHARGE PLANNING  Goal: Discharge to home or other facility with appropriate resources  Description: INTERVENTIONS:  - Identify barriers to discharge w/patient and caregiver  - Arrange for needed discharge resources and transportation as appropriate  - Identify discharge learning needs (meds, wound care, etc.)  - Arrange for interpretive services to assist at discharge as needed  - Refer to Case Management Department for coordinating discharge planning if the patient needs post-hospital services based on physician/advanced practitioner order or complex needs related to functional status, cognitive ability, or social support system  Outcome: Progressing     Problem: Knowledge  Deficit  Goal: Patient/family/caregiver demonstrates understanding of disease process, treatment plan, medications, and discharge instructions  Description: Complete learning assessment and assess knowledge base.  Interventions:  - Provide teaching at level of understanding  - Provide teaching via preferred learning methods  Outcome: Progressing

## 2025-02-13 NOTE — PLAN OF CARE
Problem: PAIN - ADULT  Goal: Verbalizes/displays adequate comfort level or baseline comfort level  Description: Interventions:  - Encourage patient to monitor pain and request assistance  - Assess pain using appropriate pain scale  - Administer analgesics based on type and severity of pain and evaluate response  - Implement non-pharmacological measures as appropriate and evaluate response  - Consider cultural and social influences on pain and pain management  - Notify physician/advanced practitioner if interventions unsuccessful or patient reports new pain  2/13/2025 1234 by Ashlyn Henriquez RN  Outcome: Adequate for Discharge  2/13/2025 1045 by Ashlyn Henriquez RN  Outcome: Progressing     Problem: INFECTION - ADULT  Goal: Absence or prevention of progression during hospitalization  Description: INTERVENTIONS:  - Assess and monitor for signs and symptoms of infection  - Monitor lab/diagnostic results  - Monitor all insertion sites, i.e. indwelling lines, tubes, and drains  - Monitor endotracheal if appropriate and nasal secretions for changes in amount and color  - [place] appropriate cooling/warming therapies per order  - Administer medications as ordered  - Instruct and encourage patient and family to use good hand hygiene technique  - Identify and instruct in appropriate isolation precautions for identified infection/condition  Outcome: Adequate for Discharge  Goal: Absence of fever/infection during neutropenic period  Description: INTERVENTIONS:  - Monitor WBC    Outcome: Adequate for Discharge     Problem: SAFETY ADULT  Goal: Patient will remain free of falls  Description: INTERVENTIONS:  - Educate patient/family on patient safety including physical limitations  - Instruct patient to call for assistance with activity   - Consult OT/PT to assist with strengthening/mobility   - Keep Call bell within reach  - Keep bed low and locked with side rails adjusted as appropriate  - Keep care items and personal  belongings within reach  - Initiate and maintain comfort rounds  - Make Fall Risk Sign visible to staff    - Apply yellow socks and bracelet for high fall risk patients  - Consider moving patient to room near nurses station  2/13/2025 1234 by Ashlyn Henriquez RN  Outcome: Adequate for Discharge  2/13/2025 1045 by Ashlyn Henriquez RN  Outcome: Progressing  Goal: Maintain or return to baseline ADL function  Description: INTERVENTIONS:  -  Assess patient's ability to carry out ADLs; assess patient's baseline for ADL function and identify physical deficits which impact ability to perform ADLs (bathing, care of mouth/teeth, toileting, grooming, dressing, etc.)  - Assess/evaluate cause of self-care deficits   - Assess range of motion  - Assess patient's mobility; develop plan if impaired  - Assess patient's need for assistive devices and provide as appropriate  - Encourage maximum independence but intervene and supervise when necessary  - Involve family in performance of ADLs  - Assess for home care needs following discharge   - Consider OT consult to assist with ADL evaluation and planning for discharge  - Provide patient education as appropriate  Outcome: Adequate for Discharge  Goal: Maintains/Returns to pre admission functional level  Description: INTERVENTIONS:  - Perform AM-PAC 6 Click Basic Mobility/ Daily Activity assessment daily.  - Set and communicate daily mobility goal to care team and patient/family/caregiver.   - Collaborate with rehabilitation services on mobility goals if consulted    - Out of bed for toileting  - Record patient progress and toleration of activity level   Outcome: Adequate for Discharge     Problem: DISCHARGE PLANNING  Goal: Discharge to home or other facility with appropriate resources  Description: INTERVENTIONS:  - Identify barriers to discharge w/patient and caregiver  - Arrange for needed discharge resources and transportation as appropriate  - Identify discharge learning needs  (meds, wound care, etc.)  - Arrange for interpretive services to assist at discharge as needed  - Refer to Case Management Department for coordinating discharge planning if the patient needs post-hospital services based on physician/advanced practitioner order or complex needs related to functional status, cognitive ability, or social support system  2/13/2025 1234 by Ashlyn Henriquez RN  Outcome: Adequate for Discharge  2/13/2025 1045 by Ashlyn Henriquez RN  Outcome: Progressing     Problem: Knowledge Deficit  Goal: Patient/family/caregiver demonstrates understanding of disease process, treatment plan, medications, and discharge instructions  Description: Complete learning assessment and assess knowledge base.  Interventions:  - Provide teaching at level of understanding  - Provide teaching via preferred learning methods  2/13/2025 1234 by Ashlyn Henriquez RN  Outcome: Adequate for Discharge  2/13/2025 1045 by Ashlyn Henriquez RN  Outcome: Progressing

## 2025-02-14 NOTE — UTILIZATION REVIEW
NOTIFICATION OF ADMISSION DISCHARGE   This is a Notification of Discharge from [facility]. Please be advised that this patient has been discharge from our facility. Below you will find the admission and discharge date and time including the patient’s disposition.   UTILIZATION REVIEW CONTACT:  Елена Self  Utilization   Network Utilization Review Department  Phone: [phone] x carefully listen to the prompts. All voicemails are confidential.  Email: [email or web address]     ADMISSION INFORMATION  PRESENTATION DATE: 2/3/2025  4:22 PM  OBERVATION ADMISSION DATE: N/A  INPATIENT ADMISSION DATE: 2/3/25  7:43 PM   DISCHARGE DATE: 2/13/2025  3:31 PM   DISPOSITION:Home with Home Health Care    Network Utilization Review Department  ATTENTION: Please call with any questions or concerns to [phone] and carefully listen to the prompts so that you are directed to the right person. All voicemails are confidential.   For Discharge needs, contact Care Management DC Support Team at [phone] opt. 2  Send all requests for admission clinical reviews, approved or denied determinations and any other requests to dedicated fax number below belonging to the campus where the patient is receiving treatment. List of dedicated fax numbers for the Facilities:  FACILITY NAME UR FAX NUMBER   ADMISSION DENIALS (Administrative/Medical Necessity) [phone]   DISCHARGE SUPPORT TEAM ([facility]) [phone]   PARENT CHILD HEALTH (Maternity/NICU/Pediatrics) [phone]   [facility] [phone]   [facility] [phone]   [facility] [phone]   [facility] [phone]   [facility] [place] [phone]   [facility] [phone]   [facility] [phone]   [facility]  [place] [phone]   [facility] [phone]   [facility] [phone]   [facility] [phone]   [facility] [phone]

## 2025-02-17 PROBLEM — J10.1 INFLUENZA B: Status: RESOLVED | Noted: 2024-12-26 | Resolved: 2025-02-17

## 2025-02-18 ENCOUNTER — TELEPHONE (OUTPATIENT)
Age: 55
End: 2025-02-18

## 2025-02-18 NOTE — TELEPHONE ENCOUNTER
----- Message from Sneha MARTINEZ sent at 2/13/2025  3:24 PM EST -----  Regarding: FW: f/u appt    ----- Message -----  From: William Patino MD  Sent: 1/23/2025   1:10 PM EST  To: Gastroenterology [address] Clerical  Subject: f/u appt                                         Hey can we schedule this lady for a f/u appt? Decompensated cirrhosis. Thx

## 2025-02-25 ENCOUNTER — HOSPITAL ENCOUNTER (INPATIENT)
Facility: HOSPITAL | Age: 55
LOS: 4 days | Discharge: HOME HEALTH | DRG: 871 | End: 2025-03-01
Attending: EMERGENCY MEDICINE | Admitting: FAMILY MEDICINE
Payer: COMMERCIAL

## 2025-02-25 ENCOUNTER — APPOINTMENT (EMERGENCY)
Dept: CT IMAGING | Facility: HOSPITAL | Age: 55
DRG: 871 | End: 2025-02-25
Payer: COMMERCIAL

## 2025-02-25 ENCOUNTER — APPOINTMENT (INPATIENT)
Dept: RADIOLOGY | Facility: HOSPITAL | Age: 55
DRG: 871 | End: 2025-02-25
Payer: COMMERCIAL

## 2025-02-25 DIAGNOSIS — R41.82 ALTERED MENTAL STATUS: ICD-10-CM

## 2025-02-25 DIAGNOSIS — D69.6 THROMBOCYTOPENIA (HCC): ICD-10-CM

## 2025-02-25 DIAGNOSIS — R50.9 FEVER: ICD-10-CM

## 2025-02-25 DIAGNOSIS — D70.9 NEUTROPENIA (HCC): ICD-10-CM

## 2025-02-25 DIAGNOSIS — K76.82 HEPATIC ENCEPHALOPATHY (HCC): Primary | ICD-10-CM

## 2025-02-25 DIAGNOSIS — E83.42 HYPOMAGNESEMIA: ICD-10-CM

## 2025-02-25 DIAGNOSIS — D64.9 ANEMIA: ICD-10-CM

## 2025-02-25 PROBLEM — A41.9 SEPSIS (HCC): Status: ACTIVE | Noted: 2025-02-25

## 2025-02-25 PROBLEM — D3A.098: Status: ACTIVE | Noted: 2025-02-25

## 2025-02-25 LAB
ALBUMIN SERPL BCG-MCNC: 2.7 G/DL (ref 3.5–5)
ALP SERPL-CCNC: 206 U/L (ref 34–104)
ALT SERPL W P-5'-P-CCNC: 16 U/L (ref 7–52)
AMMONIA PLAS-SCNC: 86 UMOL/L (ref 18–72)
AMPHETAMINES SERPL QL SCN: NEGATIVE
ANION GAP SERPL CALCULATED.3IONS-SCNC: 6 MMOL/L (ref 4–13)
APTT PPP: 34 SECONDS (ref 23–34)
AST SERPL W P-5'-P-CCNC: 49 U/L (ref 13–39)
BARBITURATES UR QL: NEGATIVE
BASOPHILS # BLD AUTO: 0.01 THOUSANDS/ÂΜL (ref 0–0.1)
BASOPHILS NFR BLD AUTO: 1 % (ref 0–1)
BENZODIAZ UR QL: NEGATIVE
BILIRUB SERPL-MCNC: 2.03 MG/DL (ref 0.2–1)
BILIRUB UR QL STRIP: NEGATIVE
BNP SERPL-MCNC: 88 PG/ML (ref 0–100)
BUN SERPL-MCNC: 9 MG/DL (ref 5–25)
CALCIUM ALBUM COR SERPL-MCNC: 9.3 MG/DL (ref 8.3–10.1)
CALCIUM SERPL-MCNC: 8.3 MG/DL (ref 8.4–10.2)
CARDIAC TROPONIN I PNL SERPL HS: 7 NG/L (ref ?–50)
CHLORIDE SERPL-SCNC: 101 MMOL/L (ref 96–108)
CK SERPL-CCNC: 43 U/L (ref 26–192)
CLARITY UR: CLEAR
CO2 SERPL-SCNC: 29 MMOL/L (ref 21–32)
COCAINE UR QL: NEGATIVE
COLOR UR: YELLOW
CREAT SERPL-MCNC: 0.95 MG/DL (ref 0.6–1.3)
EOSINOPHIL # BLD AUTO: 0.01 THOUSAND/ÂΜL (ref 0–0.61)
EOSINOPHIL NFR BLD AUTO: 1 % (ref 0–6)
ERYTHROCYTE [DISTWIDTH] IN BLOOD BY AUTOMATED COUNT: 18.6 % (ref 11.6–15.1)
ETHANOL SERPL-MCNC: <10 MG/DL
FENTANYL UR QL SCN: NEGATIVE
FLUAV AG UPPER RESP QL IA.RAPID: NEGATIVE
FLUBV AG UPPER RESP QL IA.RAPID: NEGATIVE
GFR SERPL CREATININE-BSD FRML MDRD: 67 ML/MIN/1.73SQ M
GLUCOSE SERPL-MCNC: 102 MG/DL (ref 65–140)
GLUCOSE SERPL-MCNC: 105 MG/DL (ref 65–140)
GLUCOSE UR STRIP-MCNC: NEGATIVE MG/DL
HCT VFR BLD AUTO: 26.4 % (ref 34.8–46.1)
HGB BLD-MCNC: 8.2 G/DL (ref 11.5–15.4)
HGB UR QL STRIP.AUTO: NEGATIVE
HYDROCODONE UR QL SCN: NEGATIVE
IMM GRANULOCYTES # BLD AUTO: 0.01 THOUSAND/UL (ref 0–0.2)
IMM GRANULOCYTES NFR BLD AUTO: 1 % (ref 0–2)
INR PPP: 1.48 (ref 0.85–1.19)
KETONES UR STRIP-MCNC: NEGATIVE MG/DL
LACTATE SERPL-SCNC: 1.6 MMOL/L (ref 0.5–2)
LEUKOCYTE ESTERASE UR QL STRIP: NEGATIVE
LIPASE SERPL-CCNC: 11 U/L (ref 11–82)
LYMPHOCYTES # BLD AUTO: 0.13 THOUSANDS/ÂΜL (ref 0.6–4.47)
LYMPHOCYTES NFR BLD AUTO: 7 % (ref 14–44)
MAGNESIUM SERPL-MCNC: 1.5 MG/DL (ref 1.9–2.7)
MCH RBC QN AUTO: 30 PG (ref 26.8–34.3)
MCHC RBC AUTO-ENTMCNC: 31.1 G/DL (ref 31.4–37.4)
MCV RBC AUTO: 97 FL (ref 82–98)
METHADONE UR QL: NEGATIVE
MONOCYTES # BLD AUTO: 0.16 THOUSAND/ÂΜL (ref 0.17–1.22)
MONOCYTES NFR BLD AUTO: 9 % (ref 4–12)
NEUTROPHILS # BLD AUTO: 1.44 THOUSANDS/ÂΜL (ref 1.85–7.62)
NEUTS SEG NFR BLD AUTO: 81 % (ref 43–75)
NITRITE UR QL STRIP: NEGATIVE
NRBC BLD AUTO-RTO: 0 /100 WBCS
OPIATES UR QL SCN: NEGATIVE
OXYCODONE+OXYMORPHONE UR QL SCN: POSITIVE
PCP UR QL: NEGATIVE
PH UR STRIP.AUTO: 8.5 [PH]
PHOSPHATE SERPL-MCNC: 2.9 MG/DL (ref 2.7–4.5)
PLATELET # BLD AUTO: 68 THOUSANDS/UL (ref 149–390)
PLATELET BLD QL SMEAR: ABNORMAL
PMV BLD AUTO: 10.1 FL (ref 8.9–12.7)
POTASSIUM SERPL-SCNC: 4 MMOL/L (ref 3.5–5.3)
PROCALCITONIN SERPL-MCNC: <0.05 NG/ML
PROT SERPL-MCNC: 6.9 G/DL (ref 6.4–8.4)
PROT UR STRIP-MCNC: NEGATIVE MG/DL
PROTHROMBIN TIME: 18.3 SECONDS (ref 12.3–15)
RBC # BLD AUTO: 2.73 MILLION/UL (ref 3.81–5.12)
RBC MORPH BLD: NORMAL
SARS-COV+SARS-COV-2 AG RESP QL IA.RAPID: NEGATIVE
SODIUM SERPL-SCNC: 136 MMOL/L (ref 135–147)
SP GR UR STRIP.AUTO: 1.01 (ref 1–1.03)
THC UR QL: NEGATIVE
UROBILINOGEN UR QL STRIP.AUTO: 1 E.U./DL
WBC # BLD AUTO: 1.76 THOUSAND/UL (ref 4.31–10.16)

## 2025-02-25 PROCEDURE — 87804 INFLUENZA ASSAY W/OPTIC: CPT | Performed by: EMERGENCY MEDICINE

## 2025-02-25 PROCEDURE — 83735 ASSAY OF MAGNESIUM: CPT | Performed by: EMERGENCY MEDICINE

## 2025-02-25 PROCEDURE — 82140 ASSAY OF AMMONIA: CPT | Performed by: EMERGENCY MEDICINE

## 2025-02-25 PROCEDURE — 85730 THROMBOPLASTIN TIME PARTIAL: CPT | Performed by: EMERGENCY MEDICINE

## 2025-02-25 PROCEDURE — 99285 EMERGENCY DEPT VISIT HI MDM: CPT | Performed by: EMERGENCY MEDICINE

## 2025-02-25 PROCEDURE — 83540 ASSAY OF IRON: CPT

## 2025-02-25 PROCEDURE — 71045 X-RAY EXAM CHEST 1 VIEW: CPT

## 2025-02-25 PROCEDURE — 36415 COLL VENOUS BLD VENIPUNCTURE: CPT | Performed by: EMERGENCY MEDICINE

## 2025-02-25 PROCEDURE — 93005 ELECTROCARDIOGRAM TRACING: CPT

## 2025-02-25 PROCEDURE — 99285 EMERGENCY DEPT VISIT HI MDM: CPT

## 2025-02-25 PROCEDURE — 81003 URINALYSIS AUTO W/O SCOPE: CPT | Performed by: EMERGENCY MEDICINE

## 2025-02-25 PROCEDURE — 82607 VITAMIN B-12: CPT

## 2025-02-25 PROCEDURE — 87811 SARS-COV-2 COVID19 W/OPTIC: CPT | Performed by: EMERGENCY MEDICINE

## 2025-02-25 PROCEDURE — 85610 PROTHROMBIN TIME: CPT | Performed by: EMERGENCY MEDICINE

## 2025-02-25 PROCEDURE — 83550 IRON BINDING TEST: CPT

## 2025-02-25 PROCEDURE — 80053 COMPREHEN METABOLIC PANEL: CPT | Performed by: EMERGENCY MEDICINE

## 2025-02-25 PROCEDURE — 87154 CUL TYP ID BLD PTHGN 6+ TRGT: CPT | Performed by: EMERGENCY MEDICINE

## 2025-02-25 PROCEDURE — 80307 DRUG TEST PRSMV CHEM ANLYZR: CPT | Performed by: EMERGENCY MEDICINE

## 2025-02-25 PROCEDURE — 83690 ASSAY OF LIPASE: CPT | Performed by: EMERGENCY MEDICINE

## 2025-02-25 PROCEDURE — 84484 ASSAY OF TROPONIN QUANT: CPT | Performed by: EMERGENCY MEDICINE

## 2025-02-25 PROCEDURE — 84145 PROCALCITONIN (PCT): CPT

## 2025-02-25 PROCEDURE — 84100 ASSAY OF PHOSPHORUS: CPT | Performed by: EMERGENCY MEDICINE

## 2025-02-25 PROCEDURE — 82306 VITAMIN D 25 HYDROXY: CPT

## 2025-02-25 PROCEDURE — 99222 1ST HOSP IP/OBS MODERATE 55: CPT | Performed by: FAMILY MEDICINE

## 2025-02-25 PROCEDURE — 82728 ASSAY OF FERRITIN: CPT

## 2025-02-25 PROCEDURE — 82948 REAGENT STRIP/BLOOD GLUCOSE: CPT

## 2025-02-25 PROCEDURE — 85025 COMPLETE CBC W/AUTO DIFF WBC: CPT | Performed by: EMERGENCY MEDICINE

## 2025-02-25 PROCEDURE — 82077 ASSAY SPEC XCP UR&BREATH IA: CPT | Performed by: EMERGENCY MEDICINE

## 2025-02-25 PROCEDURE — 87040 BLOOD CULTURE FOR BACTERIA: CPT | Performed by: EMERGENCY MEDICINE

## 2025-02-25 PROCEDURE — 82550 ASSAY OF CK (CPK): CPT | Performed by: EMERGENCY MEDICINE

## 2025-02-25 PROCEDURE — 70450 CT HEAD/BRAIN W/O DYE: CPT

## 2025-02-25 PROCEDURE — 83605 ASSAY OF LACTIC ACID: CPT | Performed by: EMERGENCY MEDICINE

## 2025-02-25 PROCEDURE — 82746 ASSAY OF FOLIC ACID SERUM: CPT

## 2025-02-25 PROCEDURE — 83880 ASSAY OF NATRIURETIC PEPTIDE: CPT | Performed by: EMERGENCY MEDICINE

## 2025-02-25 RX ORDER — ACETAMINOPHEN 10 MG/ML
1000 INJECTION, SOLUTION INTRAVENOUS ONCE
Status: COMPLETED | OUTPATIENT
Start: 2025-02-25 | End: 2025-02-25

## 2025-02-25 RX ORDER — SERTRALINE HYDROCHLORIDE 100 MG/1
100 TABLET, FILM COATED ORAL DAILY
Status: DISCONTINUED | OUTPATIENT
Start: 2025-02-26 | End: 2025-03-01 | Stop reason: HOSPADM

## 2025-02-25 RX ORDER — ATENOLOL 25 MG/1
25 TABLET ORAL DAILY
Status: DISCONTINUED | OUTPATIENT
Start: 2025-02-26 | End: 2025-03-01 | Stop reason: HOSPADM

## 2025-02-25 RX ORDER — URSODIOL 300 MG/1
300 CAPSULE ORAL 2 TIMES DAILY
Status: DISCONTINUED | OUTPATIENT
Start: 2025-02-25 | End: 2025-03-01 | Stop reason: HOSPADM

## 2025-02-25 RX ORDER — TORSEMIDE 20 MG/1
20 TABLET ORAL 2 TIMES DAILY
Status: DISCONTINUED | OUTPATIENT
Start: 2025-02-25 | End: 2025-03-01 | Stop reason: HOSPADM

## 2025-02-25 RX ORDER — LACTULOSE 10 G/15ML
30 SOLUTION ORAL 3 TIMES DAILY
Status: DISCONTINUED | OUTPATIENT
Start: 2025-02-25 | End: 2025-02-25

## 2025-02-25 RX ORDER — MAGNESIUM SULFATE HEPTAHYDRATE 40 MG/ML
2 INJECTION, SOLUTION INTRAVENOUS ONCE
Status: COMPLETED | OUTPATIENT
Start: 2025-02-25 | End: 2025-02-25

## 2025-02-25 RX ORDER — SPIRONOLACTONE 25 MG/1
25 TABLET ORAL DAILY
Status: DISCONTINUED | OUTPATIENT
Start: 2025-02-26 | End: 2025-03-01 | Stop reason: HOSPADM

## 2025-02-25 RX ORDER — FUROSEMIDE 10 MG/ML
40 INJECTION INTRAMUSCULAR; INTRAVENOUS ONCE
Status: COMPLETED | OUTPATIENT
Start: 2025-02-25 | End: 2025-02-25

## 2025-02-25 RX ORDER — MAGNESIUM SULFATE HEPTAHYDRATE 40 MG/ML
2 INJECTION, SOLUTION INTRAVENOUS ONCE
Status: COMPLETED | OUTPATIENT
Start: 2025-02-25 | End: 2025-02-26

## 2025-02-25 RX ORDER — LACTULOSE 10 G/15ML
30 SOLUTION ORAL 4 TIMES DAILY
Status: DISCONTINUED | OUTPATIENT
Start: 2025-02-26 | End: 2025-02-25

## 2025-02-25 RX ORDER — POTASSIUM CHLORIDE 1500 MG/1
40 TABLET, EXTENDED RELEASE ORAL DAILY
Status: DISCONTINUED | OUTPATIENT
Start: 2025-02-26 | End: 2025-03-01 | Stop reason: HOSPADM

## 2025-02-25 RX ORDER — LACTULOSE 10 G/15ML
30 SOLUTION ORAL ONCE
Status: COMPLETED | OUTPATIENT
Start: 2025-02-25 | End: 2025-02-25

## 2025-02-25 RX ORDER — ENOXAPARIN SODIUM 100 MG/ML
40 INJECTION SUBCUTANEOUS DAILY
Status: DISCONTINUED | OUTPATIENT
Start: 2025-02-26 | End: 2025-02-25

## 2025-02-25 RX ORDER — LACTULOSE 10 G/15ML
30 SOLUTION ORAL 4 TIMES DAILY
Status: DISCONTINUED | OUTPATIENT
Start: 2025-02-26 | End: 2025-03-01 | Stop reason: HOSPADM

## 2025-02-25 RX ORDER — CEFTRIAXONE 1 G/50ML
1000 INJECTION, SOLUTION INTRAVENOUS ONCE
Status: COMPLETED | OUTPATIENT
Start: 2025-02-25 | End: 2025-02-25

## 2025-02-25 RX ORDER — LACTULOSE 10 G/15ML
20 SOLUTION ORAL ONCE
Status: DISCONTINUED | OUTPATIENT
Start: 2025-02-25 | End: 2025-02-25

## 2025-02-25 RX ORDER — FOLIC ACID 1 MG/1
1 TABLET ORAL DAILY
Status: DISCONTINUED | OUTPATIENT
Start: 2025-02-26 | End: 2025-03-01 | Stop reason: HOSPADM

## 2025-02-25 RX ORDER — ACETAMINOPHEN 325 MG/1
650 TABLET ORAL EVERY 6 HOURS PRN
Status: DISCONTINUED | OUTPATIENT
Start: 2025-02-25 | End: 2025-03-01 | Stop reason: HOSPADM

## 2025-02-25 RX ORDER — PANTOPRAZOLE SODIUM 40 MG/1
40 TABLET, DELAYED RELEASE ORAL
Status: DISCONTINUED | OUTPATIENT
Start: 2025-02-26 | End: 2025-03-01 | Stop reason: HOSPADM

## 2025-02-25 RX ORDER — MIDODRINE HYDROCHLORIDE 5 MG/1
10 TABLET ORAL
Status: DISCONTINUED | OUTPATIENT
Start: 2025-02-26 | End: 2025-03-01 | Stop reason: HOSPADM

## 2025-02-25 RX ORDER — KETOROLAC TROMETHAMINE 30 MG/ML
15 INJECTION, SOLUTION INTRAMUSCULAR; INTRAVENOUS ONCE
Status: DISCONTINUED | OUTPATIENT
Start: 2025-02-25 | End: 2025-02-25

## 2025-02-25 RX ORDER — DAPSONE 25 MG/1
50 TABLET ORAL DAILY
Status: DISCONTINUED | OUTPATIENT
Start: 2025-02-26 | End: 2025-03-01 | Stop reason: HOSPADM

## 2025-02-25 RX ADMIN — CEFTRIAXONE 1000 MG: 1 INJECTION, SOLUTION INTRAVENOUS at 20:22

## 2025-02-25 RX ADMIN — FUROSEMIDE 40 MG: 10 INJECTION, SOLUTION INTRAVENOUS at 21:27

## 2025-02-25 RX ADMIN — MAGNESIUM SULFATE HEPTAHYDRATE 2 G: 40 INJECTION, SOLUTION INTRAVENOUS at 23:45

## 2025-02-25 RX ADMIN — URSODIOL 300 MG: 300 CAPSULE ORAL at 22:41

## 2025-02-25 RX ADMIN — LACTULOSE 30 G: 20 SOLUTION ORAL at 19:56

## 2025-02-25 RX ADMIN — MAGNESIUM SULFATE HEPTAHYDRATE 2 G: 40 INJECTION, SOLUTION INTRAVENOUS at 19:50

## 2025-02-25 RX ADMIN — ACETAMINOPHEN 1000 MG: 1000 INJECTION, SOLUTION INTRAVENOUS at 19:53

## 2025-02-25 RX ADMIN — RIFAXIMIN 550 MG: 550 TABLET ORAL at 22:41

## 2025-02-25 NOTE — ED PROVIDER NOTES
Time reflects when diagnosis was documented in both MDM as applicable and the Disposition within this note       Time User Action Codes Description Comment    2/25/2025  7:16 PM Remaley, Moises Add [K76.82] Hepatic encephalopathy (HCC)     2/25/2025  7:16 PM Remaley, Moises Add [R41.82] Altered mental status     2/25/2025  7:17 PM Remaley, Moises Add [D64.9] Anemia     2/25/2025  7:17 PM Remaley, Moises Add [D70.9] Neutropenia (HCC)     2/25/2025  7:17 PM Remaley, Moises Add [D69.6] Thrombocytopenia (HCC)     2/25/2025  7:17 PM Remaley, Moises Add [E83.42] Hypomagnesemia     2/25/2025  7:17 PM Remaley, Moises Add [R50.9] Fever           ED Disposition       ED Disposition   Admit    Condition   Stable    Date/Time   Tue Feb 25, 2025  7:17 PM    Comment   Case was discussed with Dr. Delgado and the patient's admission status was agreed to be Admission Status: inpatient status to the service of Dr. Delgado.               Assessment & Plan       Medical Decision Making  Amount and/or Complexity of Data Reviewed  Labs: ordered.  Radiology: ordered and independent interpretation performed.    Risk  Prescription drug management.  Decision regarding hospitalization.        ED Course as of 02/25/25 2023 Tue Feb 25, 2025 1921 Spoke with spouse advanced practitioner on-call [place] reviewed case and findings in the emergency department management thus far accepts for admission on behalf of Dr. Delgado.         Medications   magnesium sulfate 2 g/50 mL IVPB (premix) 2 g (2 g Intravenous New Bag 2/25/25 1950)   cefTRIAXone (ROCEPHIN) IVPB (premix in dextrose) 1,000 mg 50 mL (has no administration in time range)   lactulose (CHRONULAC) oral solution 30 g (30 g Oral Given 2/25/25 1956)   acetaminophen (Ofirmev) injection 1,000 mg (0 mg Intravenous Stopped 2/25/25 2021)       ED Risk Strat Scores                                                History of Present Illness       Chief Complaint   Patient presents with    Altered Mental Status     Patient  brought in by EMS from home for increased confusion and weakness. Patient leland known cirrhosis of liver and has high ammonia levels.        Past Medical History:   Diagnosis Date    Back pain     Carcinoid tumor     neuroendocrine    CHF (congestive heart failure) (HCC)     Cirrhosis of liver (HCC)     Sarcoidosis       Past Surgical History:   Procedure Laterality Date    CHOLECYSTECTOMY      GASTRIC BYPASS      HERNIA REPAIR      IR KYPHOPLASTY/VERTEBROPLASTY  10/15/2024      Family History   Problem Relation Age of Onset    Hypertension Father       Social History     Tobacco Use    Smoking status: Never    Smokeless tobacco: Never   Vaping Use    Vaping status: Never Used   Substance Use Topics    Alcohol use: Never    Drug use: Never      E-Cigarette/Vaping    E-Cigarette Use Never User       E-Cigarette/Vaping Substances      I have reviewed and agree with the history as documented.     Patient is a 54-year-old female history of chronic pain CHF sarcoidosis cirrhosis of liver carcinoid tumor and hepatic encephalopathy presents emergency department due to generalized weakness and confusion patient reports having trouble concentrating and completing tasks and generally weak and fatigued over the past few days.  Denies vomiting.        History provided by:  Patient and EMS personnel  Altered Mental Status  Presenting symptoms: confusion    Associated symptoms: fever and weakness    Associated symptoms: no abdominal pain, no headaches, no nausea and no vomiting        Review of Systems   Constitutional:  Positive for fever.   Respiratory:  Negative for cough and shortness of breath.    Cardiovascular:  Negative for chest pain.   Gastrointestinal:  Negative for abdominal pain, nausea and vomiting.   Neurological:  Positive for weakness. Negative for headaches.   Psychiatric/Behavioral:  Positive for confusion and decreased concentration.    All other systems reviewed and are negative.          Objective       ED  Triage Vitals [02/25/25 1812]   Temperature Pulse Blood Pressure Respirations SpO2 Patient Position - Orthostatic VS   (!) 103 °F (39.4 °C) 101 126/61 18 96 % Lying      Temp Source Heart Rate Source BP Location FiO2 (%) Pain Score    Temporal Monitor Left arm -- --      Vitals      Date and Time Temp Pulse SpO2 Resp BP Pain Score FACES Pain Rating User   02/25/25 2015 -- 99 94 % 22 127/60 -- -- SB   02/25/25 2000 -- 101 94 % 24 130/65 -- -- SB   02/25/25 1930 -- 100 95 % 30 128/72 -- -- SB   02/25/25 1845 -- 100 93 % 27 -- -- -- MY   02/25/25 1830 -- 97 94 % 32 -- -- -- MY   02/25/25 1815 -- 101 94 % 19 128/59 -- -- MY   02/25/25 1812 103 °F (39.4 °C) 101 96 % 18 126/61 -- -- AFG            Physical Exam  Vitals and nursing note reviewed.   Constitutional:       General: She is not in acute distress.     Appearance: Normal appearance.   HENT:      Head: Normocephalic and atraumatic.      Nose: Nose normal.   Eyes:      Extraocular Movements: Extraocular movements intact.      Conjunctiva/sclera: Conjunctivae normal.      Pupils: Pupils are equal, round, and reactive to light.   Pulmonary:      Effort: Pulmonary effort is normal. No respiratory distress.   Abdominal:      Palpations: Abdomen is soft.      Tenderness: There is no abdominal tenderness.   Skin:     General: Skin is dry.   Neurological:      General: No focal deficit present.      Mental Status: She is alert and oriented to person, place, and time. She is confused.      Comments: slow responses  somnolent         Results Reviewed       Procedure Component Value Units Date/Time    Procalcitonin [[id number]]  (Normal) Collected: 02/25/25 1824    Lab Status: Final result Specimen: Blood from Arm, Right Updated: 02/25/25 1958     Procalcitonin <0.05 ng/ml     Folate [[id number]] Collected: 02/25/25 1824    Lab Status: In process Specimen: Blood from Arm, Right Updated: 02/25/25 1921    TIBC Panel (incl. Iron, TIBC, % Iron Saturation) [[id number]] Collected:  02/25/25 1824    Lab Status: In process Specimen: Blood from Arm, Right Updated: 02/25/25 1921    Ferritin [[id number]] Collected: 02/25/25 1824    Lab Status: In process Specimen: Blood from Arm, Right Updated: 02/25/25 1921    Vitamin B12 [[id number]] Collected: 02/25/25 1824    Lab Status: In process Specimen: Blood from Arm, Right Updated: 02/25/25 1921    Vitamin D 25 hydroxy [[id number]] Collected: 02/25/25 1824    Lab Status: In process Specimen: Blood from Arm, Right Updated: 02/25/25 1921    HS Troponin 0hr (reflex protocol) [[id number]]  (Normal) Collected: 02/25/25 1824    Lab Status: Final result Specimen: Blood from Arm, Right Updated: 02/25/25 1902     hs TnI 0hr 7 ng/L     B-Type Natriuretic Peptide(BNP) [[id number]]  (Normal) Collected: 02/25/25 1824    Lab Status: Final result Specimen: Blood from Arm, Right Updated: 02/25/25 1901     BNP 88 pg/mL     Protime-INR [[id number]]  (Abnormal) Collected: 02/25/25 1824    Lab Status: Final result Specimen: Blood from Arm, Right Updated: 02/25/25 1859     Protime 18.3 seconds      INR 1.48    Narrative:      INR Therapeutic Range    Indication                                             INR Range      Atrial Fibrillation                                               2.0-3.0  Hypercoagulable State                                    2.0.2.3  Left Ventricular Asist Device                            2.0-3.0  Mechanical Heart Valve                                  -    Aortic(with afib, MI, embolism, HF, LA enlargement,    and/or coagulopathy)                                     2.0-3.0 (2.5-3.5)     Mitral                                                             2.5-3.5  Prosthetic/Bioprosthetic Heart Valve               2.0-3.0  Venous thromboembolism (VTE: VT, PE        2.0-3.0    APTT [[id number]]  (Normal) Collected: 02/25/25 1824    Lab Status: Final result Specimen: Blood from Arm, Right Updated: 02/25/25 1859     PTT 34 seconds     Smear Review(Phlebs Do Not  Order) [[id number]]  (Abnormal) Collected: 02/25/25 1824    Lab Status: Final result Specimen: Blood from Arm, Right Updated: 02/25/25 1858     RBC Morphology Normal     Platelet Estimate Decreased    CBC and differential [[id number]]  (Abnormal) Collected: 02/25/25 1824    Lab Status: Final result Specimen: Blood from Arm, Right Updated: 02/25/25 1858     WBC 1.76 Thousand/uL      RBC 2.73 Million/uL      Hemoglobin 8.2 g/dL      Hematocrit 26.4 %      MCV 97 fL      MCH 30.0 pg      MCHC 31.1 g/dL      RDW 18.6 %      MPV 10.1 fL      Platelets 68 Thousands/uL      nRBC 0 /100 WBCs      Segmented % 81 %      Immature Grans % 1 %      Lymphocytes % 7 %      Monocytes % 9 %      Eosinophils Relative 1 %      Basophils Relative 1 %      Absolute Neutrophils 1.44 Thousands/µL      Absolute Immature Grans 0.01 Thousand/uL      Absolute Lymphocytes 0.13 Thousands/µL      Absolute Monocytes 0.16 Thousand/µL      Eosinophils Absolute 0.01 Thousand/µL      Basophils Absolute 0.01 Thousands/µL     Narrative:      This is an appended report.  These results have been appended to a previously verified report.    Lactic acid, plasma (w/reflex if result > 2.0) [[id number]]  (Normal) Collected: 02/25/25 1824    Lab Status: Final result Specimen: Blood from Arm, Right Updated: 02/25/25 1856     LACTIC ACID 1.6 mmol/L     Narrative:      Result may be elevated if tourniquet was used during collection.    Comprehensive metabolic panel [[id number]]  (Abnormal) Collected: 02/25/25 1824    Lab Status: Final result Specimen: Blood from Arm, Right Updated: 02/25/25 1856     Sodium 136 mmol/L      Potassium 4.0 mmol/L      Chloride 101 mmol/L      CO2 29 mmol/L      ANION GAP 6 mmol/L      BUN 9 mg/dL      Creatinine 0.95 mg/dL      Glucose 102 mg/dL      Calcium 8.3 mg/dL      Corrected Calcium 9.3 mg/dL      AST 49 U/L      ALT 16 U/L      Alkaline Phosphatase 206 U/L      Total Protein 6.9 g/dL      Albumin 2.7 g/dL      Total Bilirubin  2.03 mg/dL      eGFR 67 ml/min/1.73sq m     Narrative:      National Kidney Disease Foundation guidelines for Chronic Kidney Disease (CKD):     Stage 1 with normal or high GFR (GFR > 90 mL/min/1.73 square meters)    Stage 2 Mild CKD (GFR = 60-89 mL/min/1.73 square meters)    Stage 3A Moderate CKD (GFR = 45-59 mL/min/1.73 square meters)    Stage 3B Moderate CKD (GFR = 30-44 mL/min/1.73 square meters)    Stage 4 Severe CKD (GFR = 15-29 mL/min/1.73 square meters)    Stage 5 End Stage CKD (GFR <15 mL/min/1.73 square meters)  Note: GFR calculation is accurate only with a steady state creatinine    Magnesium [[id number]]  (Abnormal) Collected: 02/25/25 1824    Lab Status: Final result Specimen: Blood from Arm, Right Updated: 02/25/25 1856     Magnesium 1.5 mg/dL     Phosphorus [[id number]]  (Normal) Collected: 02/25/25 1824    Lab Status: Final result Specimen: Blood from Arm, Right Updated: 02/25/25 1856     Phosphorus 2.9 mg/dL     Lipase [[id number]]  (Normal) Collected: 02/25/25 1824    Lab Status: Final result Specimen: Blood from Arm, Right Updated: 02/25/25 1856     Lipase 11 u/L     CK [[id number]]  (Normal) Collected: 02/25/25 1824    Lab Status: Final result Specimen: Blood from Arm, Right Updated: 02/25/25 1856     Total CK 43 U/L     Ethanol [[id number]]  (Normal) Collected: 02/25/25 1824    Lab Status: Final result Specimen: Blood from Arm, Right Updated: 02/25/25 1855     Ethanol Lvl <10 mg/dL     Ammonia [[id number]]  (Abnormal) Collected: 02/25/25 1824    Lab Status: Final result Specimen: Blood from Arm, Right Updated: 02/25/25 1854     Ammonia 86 umol/L     FLU/COVID Rapid Antigen (30 min. TAT) - Preferred screening test in ED [[id number]]  (Normal) Collected: 02/25/25 1824    Lab Status: Final result Specimen: Nares from Nose Updated: 02/25/25 1851     SARS COV Rapid Antigen Negative     Influenza A Rapid Antigen Negative     Influenza B Rapid Antigen Negative    Narrative:      This test has been performed  using the Quidel Magdalena 2 FLU+SARS Antigen test under the Emergency Use Authorization (EUA). This test has been validated by the  and verified by the performing laboratory. The Magdalena uses lateral flow immunofluorescent sandwich assay to detect SARS-COV, Influenza A and Influenza B Antigen.     The Quidel Magdalena 2 SARS Antigen test does not differentiate between SARS-CoV and SARS-CoV-2.     Negative results are presumptive and may be confirmed with a molecular assay, if necessary, for patient management. Negative results do not rule out SARS-CoV-2 or influenza infection and should not be used as the sole basis for treatment or patient management decisions. A negative test result may occur if the level of antigen in a sample is below the limit of detection of this test.     Positive results are indicative of the presence of viral antigens, but do not rule out bacterial infection or co-infection with other viruses.     All test results should be used as an adjunct to clinical observations and other information available to the provider.    FOR PEDIATRIC PATIENTS - copy/paste COVID Guidelines URL to browser: [email or web address]    Blood culture #1 [[id number]] Collected: 02/25/25 1824    Lab Status: In process Specimen: Blood from Arm, Right Updated: 02/25/25 1831    Blood culture #2 [[id number]] Collected: 02/25/25 1824    Lab Status: In process Specimen: Blood from Arm, Left Updated: 02/25/25 1831    Fingerstick Glucose (POCT) [[id number]]  (Normal) Collected: 02/25/25 1814    Lab Status: Final result Specimen: Blood Updated: 02/25/25 1814     POC Glucose 105 mg/dl     Rapid drug screen, urine [[id number]]     Lab Status: No result Specimen: Urine     UA w Reflex to Microscopic w Reflex to Culture [[id number]]     Lab Status: No result Specimen: Urine             XR chest 1 view portable   ED Interpretation by Moises Wharton DO (02/25 1955)   No acute  cardiopulmonary disease      Final Interpretation by Sreekanth Alexander MD (02/25 2000)      Interstitial and vascular prominence in the lungs suggesting mild CHF. Correlate with clinical scenario.      Findings slightly discrepant from the ER interpretation. The study was marked in EPIC for immediate notification.      Workstation performed: [id number]         CT head without contrast   Final Interpretation by Pallav N Shah, MD (02/25 8873)      No acute intracranial abnormality.                  Workstation performed: [id number]             ECG 12 Lead Documentation Only    Date/Time: 2/25/2025 6:58 PM    Performed by: Moises Wharton DO  Authorized by: Moises Wharton DO    ECG reviewed by me, the ED Provider: yes    Patient location:  ED  Previous ECG:     Comparison to cardiac monitor: Yes    Quality:     Tracing quality:  Limited by artifact  Rate:     ECG rate:  100    ECG rate assessment: tachycardic    Rhythm:     Rhythm: sinus tachycardia    QRS:     QRS axis:  Normal    QRS intervals:  Normal  Conduction:     Conduction: normal    ST segments:     ST segments:  Non-specific  T waves:     T waves: non-specific    Other findings:     Other findings: prolonged qTc interval        ED Medication and Procedure Management   Prior to Admission Medications   Prescriptions Last Dose Informant Patient Reported? Taking?   amitriptyline (ELAVIL) 75 mg tablet  Pharmacy (Specify) Yes No   Sig: Take 75 mg by mouth daily at bedtime   atenolol (TENORMIN) 25 mg tablet   No No   Sig: Take 1 tablet (25 mg total) by mouth daily   cyclobenzaprine (FLEXERIL) 10 mg tablet  Pharmacy (Specify) Yes No   Sig: Take 10 mg by mouth 3 (three) times a day as needed for muscle spasms   dapsone 25 mg tablet   Yes No   Sig: Take 50 mg by mouth daily   folic acid (FOLVITE) 1 mg tablet   Yes No   Sig: Take 1 mg by mouth daily   lactulose (CHRONULAC) 10 g/15 mL solution   No No   Sig: Take 45 mL (30 g total) by mouth 4 (four) times a day    lidocaine (LIDODERM) 5 %  Pharmacy (Specify) No No   Sig: Apply 1 patch topically over 12 hours daily Remove & Discard patch within 12 hours or as directed by MD   midodrine (PROAMATINE) 2.5 mg tablet   No No   Sig: Take 4 tablets (10 mg total) by mouth 3 (three) times a day before meals   nystatin (MYCOSTATIN) powder   No No   Sig: Apply topically 2 (two) times a day   oxyCODONE (ROXICODONE) 10 MG TABS  Pharmacy (Specify) Yes No   Sig: Take 10 mg by mouth every 6 (six) hours as needed for moderate pain   pantoprazole (PROTONIX) 40 mg tablet   No No   Sig: Take 1 tablet (40 mg total) by mouth daily in the early morning   polyethylene glycol (MIRALAX) 17 g packet  Pharmacy (Specify) Yes No   Sig: Take 17 g by mouth daily as needed   potassium chloride (Klor-Con M20) 20 mEq tablet   No No   Sig: Take 2 tablets (40 mEq total) by mouth daily   rifaximin (XIFAXAN) 550 mg tablet   Yes No   Sig: Take 550 mg by mouth every 12 (twelve) hours   sertraline (ZOLOFT) 100 mg tablet   No No   Sig: Take 1 tablet (100 mg total) by mouth daily   spironolactone (ALDACTONE) 25 mg tablet   No No   Sig: Take 1 tablet (25 mg total) by mouth daily   torsemide (DEMADEX) 20 mg tablet   No No   Sig: Take 1 tablet (20 mg total) by mouth 2 (two) times a day   ursodiol (ACTIGALL) 300 mg capsule   Yes No   Sig: Take 300 mg by mouth 2 (two) times a day      Facility-Administered Medications: None     Patient's Medications   Discharge Prescriptions    No medications on file     No discharge procedures on file.  ED SEPSIS DOCUMENTATION   Time reflects when diagnosis was documented in both MDM as applicable and the Disposition within this note       Time User Action Codes Description Comment    2/25/2025  7:16 PM Moises Wharton [K76.82] Hepatic encephalopathy (HCC)     2/25/2025  7:16 PM Moises Wharton [R41.82] Altered mental status     2/25/2025  7:17 PM Moises Wharton Add [D64.9] Anemia     2/25/2025  7:17 PM Moises Wharton [D70.9]  Neutropenia ([facility])     2/25/2025  7:17 PM Moises Wharton Add [D69.6] Thrombocytopenia (HCC)     2/25/2025  7:17 PM Moises Wharton Add [E83.42] Hypomagnesemia     2/25/2025  7:17 PM Moises Wharton Add [R50.9] Fever                  Moises S Akiko, DO  02/25/25 2023

## 2025-02-26 ENCOUNTER — APPOINTMENT (INPATIENT)
Dept: ULTRASOUND IMAGING | Facility: HOSPITAL | Age: 55
DRG: 871 | End: 2025-02-26
Payer: COMMERCIAL

## 2025-02-26 PROBLEM — J96.00 ACUTE RESPIRATORY FAILURE (HCC): Status: ACTIVE | Noted: 2025-02-26

## 2025-02-26 LAB
25(OH)D3 SERPL-MCNC: 11.9 NG/ML (ref 30–100)
ALBUMIN SERPL BCG-MCNC: 2.7 G/DL (ref 3.5–5)
ALP SERPL-CCNC: 191 U/L (ref 34–104)
ALT SERPL W P-5'-P-CCNC: 17 U/L (ref 7–52)
AMMONIA PLAS-SCNC: 60 UMOL/L (ref 18–72)
ANION GAP SERPL CALCULATED.3IONS-SCNC: 2 MMOL/L (ref 4–13)
AST SERPL W P-5'-P-CCNC: 56 U/L (ref 13–39)
ATRIAL RATE: 100 BPM
BASE EX.OXY STD BLDV CALC-SCNC: 79.6 % (ref 60–80)
BASE EXCESS BLDV CALC-SCNC: 2.2 MMOL/L
BASOPHILS # BLD AUTO: 0.02 THOUSANDS/ÂΜL (ref 0–0.1)
BASOPHILS NFR BLD AUTO: 2 % (ref 0–1)
BILIRUB SERPL-MCNC: 1.79 MG/DL (ref 0.2–1)
BUN SERPL-MCNC: 10 MG/DL (ref 5–25)
CALCIUM ALBUM COR SERPL-MCNC: 9.1 MG/DL (ref 8.3–10.1)
CALCIUM SERPL-MCNC: 8.1 MG/DL (ref 8.4–10.2)
CHLORIDE SERPL-SCNC: 103 MMOL/L (ref 96–108)
CO2 SERPL-SCNC: 32 MMOL/L (ref 21–32)
CREAT SERPL-MCNC: 1.01 MG/DL (ref 0.6–1.3)
EOSINOPHIL # BLD AUTO: 0 THOUSAND/ÂΜL (ref 0–0.61)
EOSINOPHIL NFR BLD AUTO: 0 % (ref 0–6)
ERYTHROCYTE [DISTWIDTH] IN BLOOD BY AUTOMATED COUNT: 18.8 % (ref 11.6–15.1)
EST. AVERAGE GLUCOSE BLD GHB EST-MCNC: 88 MG/DL
FERRITIN SERPL-MCNC: 29 NG/ML (ref 11–307)
FOLATE SERPL-MCNC: >22.3 NG/ML
GFR SERPL CREATININE-BSD FRML MDRD: 62 ML/MIN/1.73SQ M
GLUCOSE SERPL-MCNC: 116 MG/DL (ref 65–140)
HBA1C MFR BLD: 4.7 %
HCO3 BLDV-SCNC: 26.3 MMOL/L (ref 24–30)
HCT VFR BLD AUTO: 25.9 % (ref 34.8–46.1)
HGB BLD-MCNC: 8.1 G/DL (ref 11.5–15.4)
IMM GRANULOCYTES # BLD AUTO: 0 THOUSAND/UL (ref 0–0.2)
IMM GRANULOCYTES NFR BLD AUTO: 0 % (ref 0–2)
IRON SATN MFR SERPL: 37 % (ref 15–50)
IRON SERPL-MCNC: 104 UG/DL (ref 50–212)
LYMPHOCYTES # BLD AUTO: 0.12 THOUSANDS/ÂΜL (ref 0.6–4.47)
LYMPHOCYTES NFR BLD AUTO: 13 % (ref 14–44)
MAGNESIUM SERPL-MCNC: 2.2 MG/DL (ref 1.9–2.7)
MCH RBC QN AUTO: 30.6 PG (ref 26.8–34.3)
MCHC RBC AUTO-ENTMCNC: 31.3 G/DL (ref 31.4–37.4)
MCV RBC AUTO: 98 FL (ref 82–98)
MONOCYTES # BLD AUTO: 0.14 THOUSAND/ÂΜL (ref 0.17–1.22)
MONOCYTES NFR BLD AUTO: 15 % (ref 4–12)
NEUTROPHILS # BLD AUTO: 0.68 THOUSANDS/ÂΜL (ref 1.85–7.62)
NEUTS SEG NFR BLD AUTO: 70 % (ref 43–75)
NRBC BLD AUTO-RTO: 0 /100 WBCS
O2 CT BLDV-SCNC: 10.3 ML/DL
P AXIS: 65 DEGREES
PCO2 BLDV: 39 MM HG (ref 42–50)
PH BLDV: 7.45 [PH] (ref 7.3–7.4)
PLATELET # BLD AUTO: 62 THOUSANDS/UL (ref 149–390)
PMV BLD AUTO: 10.3 FL (ref 8.9–12.7)
PO2 BLDV: 48.6 MM HG (ref 35–45)
POTASSIUM SERPL-SCNC: 3.8 MMOL/L (ref 3.5–5.3)
PR INTERVAL: 184 MS
PROT SERPL-MCNC: 6.6 G/DL (ref 6.4–8.4)
QRS AXIS: 20 DEGREES
QRSD INTERVAL: 96 MS
QT INTERVAL: 384 MS
QTC INTERVAL: 495 MS
RBC # BLD AUTO: 2.65 MILLION/UL (ref 3.81–5.12)
SODIUM SERPL-SCNC: 137 MMOL/L (ref 135–147)
T WAVE AXIS: 43 DEGREES
TIBC SERPL-MCNC: 282.8 UG/DL (ref 250–450)
TRANSFERRIN SERPL-MCNC: 202 MG/DL (ref 203–362)
UIBC SERPL-MCNC: 179 UG/DL (ref 155–355)
VENTRICULAR RATE: 100 BPM
VIT B12 SERPL-MCNC: 819 PG/ML (ref 180–914)
WBC # BLD AUTO: 0.96 THOUSAND/UL (ref 4.31–10.16)

## 2025-02-26 PROCEDURE — 83735 ASSAY OF MAGNESIUM: CPT

## 2025-02-26 PROCEDURE — 82805 BLOOD GASES W/O2 SATURATION: CPT | Performed by: FAMILY MEDICINE

## 2025-02-26 PROCEDURE — 76700 US EXAM ABDOM COMPLETE: CPT

## 2025-02-26 PROCEDURE — 80053 COMPREHEN METABOLIC PANEL: CPT

## 2025-02-26 PROCEDURE — 93010 ELECTROCARDIOGRAM REPORT: CPT | Performed by: INTERNAL MEDICINE

## 2025-02-26 PROCEDURE — 83036 HEMOGLOBIN GLYCOSYLATED A1C: CPT | Performed by: FAMILY MEDICINE

## 2025-02-26 PROCEDURE — 85025 COMPLETE CBC W/AUTO DIFF WBC: CPT

## 2025-02-26 PROCEDURE — 82140 ASSAY OF AMMONIA: CPT

## 2025-02-26 RX ADMIN — PANTOPRAZOLE SODIUM 40 MG: 40 TABLET, DELAYED RELEASE ORAL at 05:10

## 2025-02-26 RX ADMIN — ACETAMINOPHEN 325MG 650 MG: 325 TABLET ORAL at 02:44

## 2025-02-26 RX ADMIN — LACTULOSE 30 G: 20 SOLUTION ORAL at 12:25

## 2025-02-26 RX ADMIN — LACTULOSE 30 G: 20 SOLUTION ORAL at 17:17

## 2025-02-26 RX ADMIN — ACETAMINOPHEN 325MG 650 MG: 325 TABLET ORAL at 09:50

## 2025-02-26 RX ADMIN — POTASSIUM CHLORIDE 40 MEQ: 1500 TABLET, EXTENDED RELEASE ORAL at 09:44

## 2025-02-26 RX ADMIN — ATENOLOL 25 MG: 25 TABLET ORAL at 09:44

## 2025-02-26 RX ADMIN — URSODIOL 300 MG: 300 CAPSULE ORAL at 17:17

## 2025-02-26 RX ADMIN — DAPSONE 50 MG: 25 TABLET ORAL at 09:46

## 2025-02-26 RX ADMIN — LACTULOSE 30 G: 20 SOLUTION ORAL at 09:44

## 2025-02-26 RX ADMIN — URSODIOL 300 MG: 300 CAPSULE ORAL at 09:44

## 2025-02-26 RX ADMIN — RIFAXIMIN 550 MG: 550 TABLET ORAL at 21:16

## 2025-02-26 RX ADMIN — SPIRONOLACTONE 25 MG: 25 TABLET, FILM COATED ORAL at 09:44

## 2025-02-26 RX ADMIN — LACTULOSE 30 G: 20 SOLUTION ORAL at 21:16

## 2025-02-26 RX ADMIN — FOLIC ACID 1 MG: 1 TABLET ORAL at 09:44

## 2025-02-26 RX ADMIN — TORSEMIDE 20 MG: 20 TABLET ORAL at 09:44

## 2025-02-26 RX ADMIN — RIFAXIMIN 550 MG: 550 TABLET ORAL at 09:44

## 2025-02-26 RX ADMIN — TORSEMIDE 20 MG: 20 TABLET ORAL at 17:17

## 2025-02-26 NOTE — CONSULTS
Consultation - Gastroenterology   Name: Bailey Olsen 54 y.o. female I MRN: [id number]  Unit/Bed#: -01 I Date of Admission: 2/25/2025   Date of Service: 2/26/2025 I Hospital Day: 1   Inpatient consult to gastroenterology  Consult performed by: Rad Manzano PA-C  Consult ordered by: MAGDA Huynh        Physician Requesting Evaluation: Mar Oliva MD   Reason for Evaluation / Principal Problem: HE/Cirrhosis    Assessment & Plan  Liver cirrhosis secondary to HYATT (nonalcoholic steatohepatitis) (HCC)  MELD-Na 15, Child Wilder class C  Ascites  Diuretics on lasix and aldactone, continue regimen  Low Na diet, 2g/day  Hepatic encephalopathy  Pt presents encephalopathic without clear cause. Possibly related to constipation and opiates for pain as she is not having multiple BMs daily. Improving with medications at this time  US or r/o ascites. Paracentesis to r/o SBP  No EV noted on recent EGD 1/18/25  No noted change in meds- unclear complaince  On chronic opiates  Has BID pantoprazole on her med list- unclear if she is taking this. No bleeding lesions noted on recent EGD so this could be stopped if HE is not improving  Avoid opiates as able and continue xifaxan and lactulose  Titrate lactulose to 3-4 loose to soft BMs daily  Esophageal varices  EGD 1/18/25 no EV  HCC screening  CT A/P no mass 12/2/24    Acute metabolic encephalopathy  As above  Hyperammonemia (HCC)  As above  Pancytopenia (HCC)  Hgb stable, WBC significantly low at 0.96, plts 68, MCV wnl  Folate 22.3, B12 wnl  Transfuse as needed and continue to monitor hgb/WBC/plts  HPI: Bailey Olsen is a 54 y.o. 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 Past surgical hx also notable for total gastrectomy and duodenectomy with EJ anastomosis.  Ammonia elevated on arrival treated with lactulose and rifaximin with a large brown formed BM documented at 2300 yesterday. Her mentation has been improving as the day goes on and she is alert and oriented during my visit. She reports abdominal fullness but not pain. No black or bloody BMs, no N/V, no hematemesis/coffee ground emesis.    She has been admitted multiple times this year most recently 2/3-13/2025 for volume overload after noncompliance with diuretics. She is a relatively poor historian and does not know her home medications saying her  sorts them for her. Attempted to call her  to discuss her meds- straight to , did not leave message.     She reports that she is compliant with her lactulose and xifaxan. She reports 1 BM a day on average. Denies prolonged constipation or diarrhea. Denies alcohol, nsaid, and tylenol use. She takes her oxycodone 3-4 times daily on average        Endoscopic risk assessment:  Anticoagulation use:n  Diabetes medication:n  CVS history:CHF  Abdominal surgeries:CCY, RYGB, hernia repair, pancreas debulking procedure  Sleep apnea:n  O2 use:n    Past Medical History:   Diagnosis Date    Back pain     Carcinoid tumor     neuroendocrine    CHF (congestive heart failure) (HCC)     Cirrhosis of liver (HCC)     Hypertension     Sarcoidosis      Past Surgical History:   Procedure Laterality Date    CHOLECYSTECTOMY      GASTRIC BYPASS      HERNIA REPAIR      IR KYPHOPLASTY/VERTEBROPLASTY  10/15/2024     Social History   Social History     Substance and Sexual Activity   Alcohol Use Never     Social History     Substance and Sexual Activity   Drug Use Never     Social History     Tobacco Use   Smoking Status Never   Smokeless Tobacco Never       Family History   Problem Relation Age of Onset    Hypertension Father     Heart attack Father     No Known Problems Sister     No Known Problems Brother     No Known Problems Brother     No Known Problems Brother     No Known  Problems Brother     No Known Problems Brother     No Known Problems Daughter          Medications Prior to Admission:     amitriptyline (ELAVIL) 75 mg tablet    atenolol (TENORMIN) 25 mg tablet    cyclobenzaprine (FLEXERIL) 10 mg tablet    dapsone 25 mg tablet    folic acid (FOLVITE) 1 mg tablet    lactulose (CHRONULAC) 10 g/15 mL solution    lidocaine (LIDODERM) 5 %    midodrine (PROAMATINE) 2.5 mg tablet    nystatin (MYCOSTATIN) powder    oxyCODONE (ROXICODONE) 10 MG TABS    pantoprazole (PROTONIX) 40 mg tablet    polyethylene glycol (MIRALAX) 17 g packet    potassium chloride (Klor-Con M20) 20 mEq tablet    rifaximin (XIFAXAN) 550 mg tablet    sertraline (ZOLOFT) 100 mg tablet    spironolactone (ALDACTONE) 25 mg tablet    torsemide (DEMADEX) 20 mg tablet    ursodiol (ACTIGALL) 300 mg capsule    Current Facility-Administered Medications:     acetaminophen (TYLENOL) tablet 650 mg, Q6H PRN    [Held by provider] amitriptyline (ELAVIL) tablet 75 mg, HS    atenolol (TENORMIN) tablet 25 mg, Daily    dapsone tablet 50 mg, Daily    folic acid (FOLVITE) tablet 1 mg, Daily    lactulose (CHRONULAC) oral solution 30 g, 4x Daily    midodrine (PROAMATINE) tablet 10 mg, TID AC    pantoprazole (PROTONIX) EC tablet 40 mg, Early Morning    potassium chloride (Klor-Con M20) CR tablet 40 mEq, Daily    rifaximin (XIFAXAN) tablet 550 mg, Q12H MIGUEL    [Held by provider] sertraline (ZOLOFT) tablet 100 mg, Daily    spironolactone (ALDACTONE) tablet 25 mg, Daily    torsemide (DEMADEX) tablet 20 mg, BID    ursodiol (ACTIGALL) capsule 300 mg, BID  Allergies   Allergen Reactions    Zolpidem GI Intolerance, Other (See Comments) and Hives     Other Reaction(s): Other      Blacks out    Blacks out    Tapentadol Sneezing and Other (See Comments)     Zoned out    Alprazolam Other (See Comments)     Other Reaction(s): Other      Feels zoned out    Feels zoned out    Amoxicillin-Pot Clavulanate Hives and Nausea Only     Amoxil fine    Dust Mite  Extract Sneezing    Morphine Headache, Nausea Only and Vomiting    Tramadol GI Intolerance    Azelastine Rash    Azelastine Hcl Rash    Molds & Smuts Rash     Other Reaction(s): Unknown       Physical Exam  Constitutional:       General: She is not in acute distress.     Appearance: She is not ill-appearing.   HENT:      Head: Normocephalic and atraumatic.   Eyes:      General: Scleral icterus present.   Pulmonary:      Effort: Pulmonary effort is normal.   Abdominal:      General: Abdomen is flat. There is distension.      Palpations: Abdomen is soft.      Tenderness: There is no abdominal tenderness.   Skin:     General: Skin is warm and dry.      Coloration: Skin is not jaundiced.   Neurological:      Mental Status: She is alert and oriented to person, place, and time.   Psychiatric:         Mood and Affect: Mood normal.         Behavior: Behavior normal.       Most Recent Vital Signs:  Vitals:    02/25/25 2302 02/26/25 0241 02/26/25 0531 02/26/25 0832   BP: 120/58   137/79   BP Location:       Pulse: 94 89  90   Resp: 20   18   Temp: 99 °F (37.2 °C) 98.1 °F (36.7 °C)  98.2 °F (36.8 °C)   TempSrc:       SpO2: 92% 95%  96%   Weight:   124 kg (274 lb 0.5 oz)        Intake/Output Summary (Last 24 hours) at 2/26/2025 0934  Last data filed at 2/26/2025 0520  Gross per 24 hour   Intake 460 ml   Output 1838 ml   Net -1378 ml       LABS/IMAGING  Lab Results: I have reviewed all relevant lab results during this hospitalization.    Imaging Studies:  I have reviewed all the relevant images during this hospitalizations    Counseling / Coordination of Care  Total time spent today 1 hour. Greater than 50% of total time was spent with the patient and / or family counseling and / or coordination of care.    Rad Manzano PA-C

## 2025-02-26 NOTE — ASSESSMENT & PLAN NOTE
"Pt presents with altered mental status  Pt reports taking all home medication  On exam, pt with slow responses but alert & orientation x 4   Chest x-ray: \"Interstitial and vascular prominence in the lungs suggesting mild CHF. Correlate with clinical scenario.\"   CT head: \"No acute intracranial abnormality.\"  Ammonia elevated at 86, trend  Ethanol normal < 10  Urine drug screen normal   Appreciate GI recommendations  "

## 2025-02-26 NOTE — ASSESSMENT & PLAN NOTE
"Noted hx of HYATT cirrhosis  EGD from 1/2025 no evidence of varices  Home medications: lactulose, rifaximin, carvedilol, and torsemide  Give lasix 40 mg   Continue home medications   Lipase normal at 11, CK normal at 43  Daily weights   ECHO 10/2024: \"Left ventricular cavity size is normal. There is mild concentric hypertrophy. The left ventricular ejection fraction is 60%. Systolic function is normal. Wall motion is normal. Diastolic function is normal. The left atrium is mildly dilated. There is mild mitral valve regurgitation. There is mild tricuspid valve regurgitation. The right ventricular systolic pressure is normal. There is no pericardial effusion.\"  Appreciate GI recommendations  "

## 2025-02-26 NOTE — ASSESSMENT & PLAN NOTE
Hgb stable, WBC significantly low at 0.96, plts 68, MCV wnl  Folate 22.3, B12 wnl  Transfuse as needed and continue to monitor hgb/WBC/plts

## 2025-02-26 NOTE — ASSESSMENT & PLAN NOTE
Lab Results   Component Value Date    AMMONIA 86 (H) 02/25/2025    AMMONIA 77 (H) 02/12/2025    AMMONIA 77 (H) 02/03/2025    AMMONIA 34 01/21/2025   Ammonia noted to be 86 on admission  Pt reports taking all home medications  Received lactulose 30 g in the ED, continue TID with goal of 3 BMs  Monitor ammonia  Appreciate GI recommendations

## 2025-02-26 NOTE — ASSESSMENT & PLAN NOTE
Lab Results   Component Value Date    RBC 2.73 (L) 02/25/2025    RBC 3.03 (L) 02/10/2025    RBC 3.00 (L) 02/09/2025    WBC 1.76 (L) 02/25/2025    WBC 2.86 (L) 02/10/2025    WBC 2.64 (L) 02/09/2025    PLT 68 (L) 02/25/2025    PLT 55 (L) 02/10/2025    PLT 57 (L) 02/09/2025   Likely secondary to liver cirrhosis  Will hold off on DVT prophylaxis, continue SCDs

## 2025-02-26 NOTE — PLAN OF CARE
Problem: RESPIRATORY - ADULT  Goal: Achieves optimal ventilation and oxygenation  Description: INTERVENTIONS:  - Assess for changes in respiratory status  - Assess for changes in mentation and behavior  - Position to facilitate oxygenation and minimize respiratory effort  - Oxygen administered by appropriate delivery if ordered  - Initiate smoking cessation education as indicated  - Encourage broncho-pulmonary hygiene including cough, deep breathe, Incentive Spirometry  - Assess the need for suctioning and aspirate as needed  - Assess and instruct to report SOB or any respiratory difficulty  - Respiratory Therapy support as indicated  Outcome: Progressing     Problem: GASTROINTESTINAL - ADULT  Goal: Maintains or returns to baseline bowel function  Description: INTERVENTIONS:  - Assess bowel function  - Encourage oral fluids to ensure adequate hydration  - Administer IV fluids if ordered to ensure adequate hydration  - Administer ordered medications as needed  - Encourage mobilization and activity  - Consider nutritional services referral to assist patient with adequate nutrition and appropriate food choices  Outcome: Progressing     Problem: GENITOURINARY - ADULT  Goal: Maintains or returns to baseline urinary function  Description: INTERVENTIONS:  - Assess urinary function  - Encourage oral fluids to ensure adequate hydration if ordered  - Administer IV fluids as ordered to ensure adequate hydration  - Administer ordered medications as needed  - Offer frequent toileting  - Follow urinary retention protocol if ordered  Outcome: Progressing     Problem: METABOLIC, FLUID AND ELECTROLYTES - ADULT  Goal: Electrolytes maintained within normal limits  Description: INTERVENTIONS:  - Monitor labs and assess patient for signs and symptoms of electrolyte imbalances  - Administer electrolyte replacement as ordered  - Monitor response to electrolyte replacements, including repeat lab results as appropriate  - Instruct patient  on fluid and nutrition as appropriate  Outcome: Progressing

## 2025-02-26 NOTE — ASSESSMENT & PLAN NOTE
Lab Results   Component Value Date    HGB 8.2 (L) 02/25/2025    HGB 9.1 (L) 02/10/2025    HGB 9.1 (L) 02/09/2025    HCT 26.4 (L) 02/25/2025    HCT 30.5 (L) 02/10/2025    HCT 29.9 (L) 02/09/2025    Noted hx   Iron panel pending   Plan to transfuse PRBC if hgb < 7  Trend CBC

## 2025-02-26 NOTE — H&P
"H&P - Hospitalist   Name: Bailey Olsen 54 y.o. female I MRN: [id number]  Unit/Bed#: [facility] I Date of Admission: 2/25/2025   Date of Service: 2/26/2025 I Hospital Day: 1     Assessment & Plan  Sepsis (HCC)  Met sepsis criteria with temp of 103, tachycardia of 101, tachypnea with of 32, WBC of 1.76  Chest x-ray: \"Interstitial and vascular prominence in the lungs suggesting mild CHF. Correlate with clinical scenario.\"   CT head: \"No acute intracranial abnormality.\"  Procalcitonin normal at < 0.05, lactic acid normal at 1.6  Blood culture pending   COVID/Flu negative  UA normal   Lactic acid normal at 1.6, Procalcitonin normal < 0.05  Fever 103, received Ofirmev 1000 mg IV in the ED  Monitor fever curve, vital signs, and symptoms   Liver cirrhosis secondary to HYATT (nonalcoholic steatohepatitis) (HCC)  Noted hx of HYATT cirrhosis  EGD from 1/2025 no evidence of varices  Home medications: lactulose, rifaximin, carvedilol, and torsemide  Give lasix 40 mg   Continue home medications   Lipase normal at 11, CK normal at 43  Daily weights   ECHO 10/2024: \"Left ventricular cavity size is normal. There is mild concentric hypertrophy. The left ventricular ejection fraction is 60%. Systolic function is normal. Wall motion is normal. Diastolic function is normal. The left atrium is mildly dilated. There is mild mitral valve regurgitation. There is mild tricuspid valve regurgitation. The right ventricular systolic pressure is normal. There is no pericardial effusion.\"  Appreciate GI recommendations  Acute metabolic encephalopathy  Pt presents with altered mental status  Pt reports taking all home medication  On exam, pt with slow responses but alert & orientation x 4   Chest x-ray: \"Interstitial and vascular prominence in the lungs suggesting mild CHF. Correlate with clinical scenario.\"   CT head: \"No acute intracranial abnormality.\"  Ammonia elevated at 86, trend  Ethanol normal < 10  Urine drug screen normal   Appreciate GI " recommendations  Hyperammonemia (HCC)  Lab Results   Component Value Date    AMMONIA 86 (H) 02/25/2025    AMMONIA 77 (H) 02/12/2025    AMMONIA 77 (H) 02/03/2025    AMMONIA 34 01/21/2025   Ammonia noted to be 86 on admission  Pt reports taking all home medications  Received lactulose 30 g in the ED, continue TID with goal of 3 BMs  Monitor ammonia  Appreciate GI recommendations  Hypomagnesemia  Lab Results   Component Value Date    MG 1.5 (L) 02/25/2025    MG 1.7 (L) 02/13/2025    MG 1.9 02/11/2025   Magnesium noted to be 1.5 on admission  Give mag sulfate 2 g IV x 2  Monitor magnesium  Class 3 severe obesity due to excess calories in adult ([facility])  Patient's BMI noted to be 47.07 on admission; during previous hospitalization from 2/3-2/13 BMI noted to be 45.31  Recommend lifestyle changes with increased green leafy vegetables and fruits with decreased red meat and processed foods  Recommend moderate to vigorous intensity exercise for 30 minutes at least 5 days a week or ~150min/week  Primary hypertension  Noted hx of HTN  Home medications atenolol, midodrine, spironolactone, torsemide  Continue home medications  Blood pressure noted to be 126/61 on admission, current blood pressure is 128/59  Monitor blood pressure  Fibromyalgia  Noted hx of chronic, diffuse pain.   Continue home regimen   Iron deficiency anemia  Lab Results   Component Value Date    HGB 8.2 (L) 02/25/2025    HGB 9.1 (L) 02/10/2025    HGB 9.1 (L) 02/09/2025    HCT 26.4 (L) 02/25/2025    HCT 30.5 (L) 02/10/2025    HCT 29.9 (L) 02/09/2025    Noted hx   Iron panel pending   Plan to transfuse PRBC if hgb < 7  Trend CBC  Pancytopenia (HCC)  Lab Results   Component Value Date    RBC 2.73 (L) 02/25/2025    RBC 3.03 (L) 02/10/2025    RBC 3.00 (L) 02/09/2025    WBC 1.76 (L) 02/25/2025    WBC 2.86 (L) 02/10/2025    WBC 2.64 (L) 02/09/2025    PLT 68 (L) 02/25/2025    PLT 55 (L) 02/10/2025    PLT 57 (L) 02/09/2025   Likely secondary to liver cirrhosis  Will hold  off on DVT prophylaxis, continue SCDs  Sarcoidosis  Noted hx   Continue home dapsone   Acute respiratory failure (HCC)  Pt currently on 2 L NC at 95%  Pt denies any baseline home oxygen needs  VBG pending   Wean oxygen as able    VTE Pharmacologic Prophylaxis: VTE Score: 4 Moderate Risk (Score 3-4) - Pharmacological DVT Prophylaxis Contraindicated. Sequential Compression Devices Ordered.  Code Status: Level 1 - Full Code Per patient   Discussion with family: Patient declined call to .     Anticipated Length of Stay: Patient will be admitted on an inpatient basis with an anticipated length of stay of greater than 2 midnights secondary to sepsis, acute metabolic encephalopathy, and hyperammonemia management.    History of Present Illness   Chief Complaint: Confusion and altered mental status    Bailey Olsen is a 54 y.o. female with a PMH of carcinoid tumor, CHF, cirrhosis of liver, HTN, sarcoidosis, GI bleed, morbid obesity, depression and anxiety, and chronic pain who presents with confusion and altered mental status for two days. Chest x-ray showed interstitial and vascular prominence in the lungs suggesting mild CHF and a CT head showed no acute intracranial abnormality. Patient denies any chills, CP, SOB, N/V, numbness, or tingling. She reports pain 8/10 in her abdomen and lower back.     Review of Systems   Constitutional:  Positive for activity change, fatigue and fever. Negative for chills.   HENT: Negative.  Negative for ear pain and sore throat.    Eyes: Negative.  Negative for pain and visual disturbance.   Respiratory:  Negative for cough and shortness of breath.    Cardiovascular: Negative.  Negative for chest pain and palpitations.   Gastrointestinal:  Positive for abdominal pain. Negative for nausea and vomiting.   Endocrine: Negative.    Genitourinary:  Negative for dysuria and hematuria.   Musculoskeletal:  Positive for back pain and myalgias. Negative for arthralgias.   Skin: Negative.   Negative for color change and rash.   Allergic/Immunologic: Negative.    Neurological:  Positive for speech difficulty and weakness. Negative for seizures, syncope and numbness.   Hematological: Negative.    Psychiatric/Behavioral:  Positive for confusion.    All other systems reviewed and are negative.    Historical Information   Past Medical History:   Diagnosis Date    Back pain     Carcinoid tumor     neuroendocrine    CHF (congestive heart failure) (HCC)     Cirrhosis of liver (HCC)     Hypertension     Sarcoidosis      Past Surgical History:   Procedure Laterality Date    CHOLECYSTECTOMY      GASTRIC BYPASS      HERNIA REPAIR      IR KYPHOPLASTY/VERTEBROPLASTY  10/15/2024     Social History     Tobacco Use    Smoking status: Never    Smokeless tobacco: Never   Vaping Use    Vaping status: Never Used   Substance and Sexual Activity    Alcohol use: Never    Drug use: Never    Sexual activity: Not Currently     E-Cigarette/Vaping    E-Cigarette Use Never User      E-Cigarette/Vaping Substances    Nicotine No     THC No     CBD No     Flavoring No     Other No     Unknown No      Family History   Problem Relation Age of Onset    Hypertension Father     Heart attack Father     No Known Problems Sister     No Known Problems Brother     No Known Problems Brother     No Known Problems Brother     No Known Problems Brother     No Known Problems Brother     No Known Problems Daughter      Social History:  Marital Status: /Civil Union   Occupation: Not working   Patient Pre-hospital Living Situation: Home  Patient Pre-hospital Level of Mobility: walks with walker  Patient Pre-hospital Diet Restrictions: Low sodium    Meds/Allergies   I have reviewed home medications with patient personally.  Prior to Admission medications    Medication Sig Start Date End Date Taking? Authorizing Provider   amitriptyline (ELAVIL) 75 mg tablet Take 75 mg by mouth daily at bedtime    Historical Provider, MD   atenolol (TENORMIN) 25  mg tablet Take 1 tablet (25 mg total) by mouth daily 2/13/25   Idalia Stuart MD   cyclobenzaprine (FLEXERIL) 10 mg tablet Take 10 mg by mouth 3 (three) times a day as needed for muscle spasms    Historical Provider, MD   dapsone 25 mg tablet Take 50 mg by mouth daily    Historical Provider, MD   folic acid (FOLVITE) 1 mg tablet Take 1 mg by mouth daily 8/4/16 2/28/25  Historical Provider, MD   lactulose (CHRONULAC) 10 g/15 mL solution Take 45 mL (30 g total) by mouth 4 (four) times a day 2/13/25   Idalia Stuart MD   lidocaine (LIDODERM) 5 % Apply 1 patch topically over 12 hours daily Remove & Discard patch within 12 hours or as directed by MD 8/4/24   Ruslan Keyes MD   midodrine (PROAMATINE) 2.5 mg tablet Take 4 tablets (10 mg total) by mouth 3 (three) times a day before meals 2/13/25   Idalia Stuart MD   nystatin (MYCOSTATIN) powder Apply topically 2 (two) times a day 10/6/24 1/17/25  Idalia Stuart MD   oxyCODONE (ROXICODONE) 10 MG TABS Take 10 mg by mouth every 6 (six) hours as needed for moderate pain    Historical Provider, MD   pantoprazole (PROTONIX) 40 mg tablet Take 1 tablet (40 mg total) by mouth daily in the early morning 2/13/25   Idalia Stuart MD   polyethylene glycol (MIRALAX) 17 g packet Take 17 g by mouth daily as needed    Historical Provider, MD   potassium chloride (Klor-Con M20) 20 mEq tablet Take 2 tablets (40 mEq total) by mouth daily 2/13/25 3/15/25  Idalia Stuart MD   rifaximin (XIFAXAN) 550 mg tablet Take 550 mg by mouth every 12 (twelve) hours    Historical Provider, MD   sertraline (ZOLOFT) 100 mg tablet Take 1 tablet (100 mg total) by mouth daily 7/8/24   Mar Oliva MD   spironolactone (ALDACTONE) 25 mg tablet Take 1 tablet (25 mg total) by mouth daily 2/13/25 3/15/25  Idalia Stuart MD   torsemide (DEMADEX) 20 mg tablet Take 1 tablet (20 mg total) by mouth 2 (two) times a day 2/13/25 3/15/25  Idalia Stuart MD   ursodiol (ACTIGALL) 300 mg capsule Take 300 mg by mouth 2 (two) times a day     Historical Provider, MD     Allergies   Allergen Reactions    Zolpidem GI Intolerance, Other (See Comments) and Hives     Other Reaction(s): Other      Blacks out    Blacks out    Tapentadol Sneezing and Other (See Comments)     Zoned out    Alprazolam Other (See Comments)     Other Reaction(s): Other      Feels zoned out    Feels zoned out    Amoxicillin-Pot Clavulanate Hives and Nausea Only     Amoxil fine    Dust Mite Extract Sneezing    Morphine Headache, Nausea Only and Vomiting    Tramadol GI Intolerance    Azelastine Rash    Azelastine Hcl Rash    Molds & Smuts Rash     Other Reaction(s): Unknown     Objective :  Temp:  [99 °F (37.2 °C)-103 °F (39.4 °C)] 99 °F (37.2 °C)  HR:  [] 94  BP: (105-130)/(57-72) 120/58  Resp:  [18-32] 20  SpO2:  [87 %-96 %] 92 %  O2 Device: Nasal cannula  Nasal Cannula O2 Flow Rate (L/min):  [2 L/min] 2 L/min    Physical Exam  Constitutional:       General: She is not in acute distress.     Appearance: She is not ill-appearing.   Cardiovascular:      Rate and Rhythm: Normal rate and regular rhythm.      Pulses: Normal pulses.      Heart sounds: Normal heart sounds. No murmur heard.  Pulmonary:      Effort: Pulmonary effort is normal. No respiratory distress.      Breath sounds: Normal breath sounds. No wheezing or rales.   Abdominal:      General: Bowel sounds are normal. There is no distension.      Palpations: Abdomen is soft.      Tenderness: There is abdominal tenderness in the right upper quadrant, right lower quadrant and epigastric area.   Musculoskeletal:         General: No swelling or tenderness.   Skin:     Findings: No erythema or rash.   Neurological:      General: No focal deficit present.      Mental Status: She is alert and oriented to person, place, and time. Mental status is at baseline. She is confused.      Sensory: Sensation is intact.      Motor: Weakness present.   Psychiatric:         Attention and Perception: Attention and perception normal.          Mood and Affect: Mood normal.         Speech: Speech is delayed.         Behavior: Behavior is cooperative.         Thought Content: Thought content normal.         Cognition and Memory: Cognition normal.         Judgment: Judgment normal.       Lines/Drains:      Lab Results: I have reviewed the following results:  Results from last 7 days   Lab Units 02/25/25  1824   WBC Thousand/uL 1.76*   HEMOGLOBIN g/dL 8.2*   HEMATOCRIT % 26.4*   PLATELETS Thousands/uL 68*   SEGS PCT % 81*   LYMPHO PCT % 7*   MONO PCT % 9   EOS PCT % 1     Results from last 7 days   Lab Units 02/25/25  1824   SODIUM mmol/L 136   POTASSIUM mmol/L 4.0   CHLORIDE mmol/L 101   CO2 mmol/L 29   BUN mg/dL 9   CREATININE mg/dL 0.95   ANION GAP mmol/L 6   CALCIUM mg/dL 8.3*   ALBUMIN g/dL 2.7*   TOTAL BILIRUBIN mg/dL 2.03*   ALK PHOS U/L 206*   ALT U/L 16   AST U/L 49*   GLUCOSE RANDOM mg/dL 102     Results from last 7 days   Lab Units 02/25/25  1824   INR  1.48*     Results from last 7 days   Lab Units 02/25/25  1814   POC GLUCOSE mg/dl 105     Lab Results   Component Value Date    HGBA1C 5.9 (H) 02/27/2024    HGBA1C 5.9 08/03/2023     Results from last 7 days   Lab Units 02/25/25  1824   LACTIC ACID mmol/L 1.6   PROCALCITONIN ng/ml <0.05     Imaging Results Review: I reviewed radiology reports from this admission including: chest xray and CT head.  Other Study Results Review: EKG was reviewed.     Administrative Statements       ** Please Note: This note has been constructed using a voice recognition system. **

## 2025-02-26 NOTE — ASSESSMENT & PLAN NOTE
"Met sepsis criteria with temp of 103, tachycardia of 101, tachypnea with of 32, WBC of 1.76  Chest x-ray: \"Interstitial and vascular prominence in the lungs suggesting mild CHF. Correlate with clinical scenario.\"   CT head: \"No acute intracranial abnormality.\"  Procalcitonin normal at < 0.05, lactic acid normal at 1.6  Blood culture pending   COVID/Flu negative  UA normal   Lactic acid normal at 1.6, Procalcitonin normal < 0.05  Fever 103, received Ofirmev 1000 mg IV in the ED  Monitor fever curve, vital signs, and symptoms   "

## 2025-02-26 NOTE — UTILIZATION REVIEW
Initial Clinical Review    Admission: Date/Time/Statement:   Admission Orders (From admission, onward)       Ordered        02/25/25 1922  INPATIENT ADMISSION  Once                          Orders Placed This Encounter   Procedures    INPATIENT ADMISSION     Standing Status:   Standing     Number of Occurrences:   1     Level of Care:   Med Surg [16]     Estimated length of stay:   More than 2 Midnights     Certification:   I certify that inpatient services are medically necessary for this patient for a duration of greater than two midnights. See H&P and MD Progress Notes for additional information about the patient's course of treatment.     ED Arrival Information       Expected   -    Arrival   2/25/2025 18:08    Acuity   Emergent              Means of arrival   Ambulance    Escorted by   [facility]    Service   Hospitalist    Admission type   Emergency              Arrival complaint   ams             Chief Complaint   Patient presents with    Altered Mental Status     Patient brought in by EMS from home for increased confusion and weakness. Patient leland known cirrhosis of liver and has high ammonia levels.        Initial Presentation: 54 y.o. female who presented by EMS to [facility] ED. Admitted as Inpatient for evaluation and treatment of sepsis, acute metabolic encephalopathy, volume overload, hyperammonemia.  Pertinent PMHx: carcinoid tumor, HF, cirrhosis, HTN, sarcoidosis. Presented w/ confusion and AMS for 2 days. Fever 103F. CXR suggestive of HF. CT Head unremarkable. Reports 8/10 pain in abdomen and lower back. EXAM: tachycardic (101), tachypnea (32), RUQ, RLQ, epigastric tenderness; confused, delayed speech, generalized weakness. WBC 1.76. Plt 68. Mag 1.5. Ammonia 86. BMI 47. PLAN: follow blood cultures, continue PTA meds, IV lasix 40 mg x1, DW, I&O, trend ammonia, Trend labs, replete electrolytes as needed; check iron panel, Supplemental O2, weaned as tolerated (requiring 2L O2,  but room air at baseline). GI consulted.       Date: 02/26/25   Day 2: Decreased breath sounds, abdominal tenderness w/ palpation. Some confusion. Plan: continue lactulose QID, continue rifaximin, Trend labs, replete electrolytes as needed. Trend ammonia. Continue torsemide.     GI: Pt w/ liver cirrhosis s/t HYATT. Unclear cause for encephalopathy. Not having multiple BMs per daily, mentation improving w/ medications. Avoid opiates as able. Continue Xifaxan and lactulose. Titrate lactulose for 3-4 loose/soft BMs daily.     ED Treatment-Medication Administration from 02/25/2025 1808 to 02/25/2025 2045         Date/Time Order Dose Route Action     02/25/2025 1950 magnesium sulfate 2 g/50 mL IVPB (premix) 2 g 2 g Intravenous New Bag     02/25/2025 1956 lactulose (CHRONULAC) oral solution 30 g 30 g Oral Given     02/25/2025 1953 acetaminophen (Ofirmev) injection 1,000 mg 1,000 mg Intravenous New Bag     02/25/2025 2022 cefTRIAXone (ROCEPHIN) IVPB (premix in dextrose) 1,000 mg 50 mL 1,000 mg Intravenous New Bag            Scheduled Medications:  atenolol, 25 mg, Oral, Daily  dapsone, 50 mg, Oral, Daily  folic acid, 1 mg, Oral, Daily  lactulose, 30 g, Oral, 4x Daily  midodrine, 10 mg, Oral, TID AC  pantoprazole, 40 mg, Oral, Early Morning  potassium chloride, 40 mEq, Oral, Daily  rifaximin, 550 mg, Oral, Q12H MIGUEL  spironolactone, 25 mg, Oral, Daily  torsemide, 20 mg, Oral, BID  ursodiol, 300 mg, Oral, BID    Continuous IV Infusions: none    PRN Meds:  acetaminophen, 650 mg, Oral, Q6H PRN; 2/26 x2  furosemide, 40 mg, Intravenous; 2/25 x1  magnesium sulfate, 2 g, Intravenous; 2/25 x1        ED Triage Vitals   Temperature Pulse Respirations Blood Pressure SpO2 Pain Score   02/25/25 1812 02/25/25 1812 02/25/25 1812 02/25/25 1812 02/25/25 1812 02/26/25 0244   (!) 103 °F (39.4 °C) 101 18 126/61 96 % 7     Weight (last 2 days)       Date/Time Weight    02/26/25 0531 124 (274.03)    02/25/25 2025 127 (280.65)    02/25/25 1812 128  (282.85)            Vital Signs (last 3 days)       Date/Time Temp Pulse Resp BP MAP (mmHg) SpO2 Calculated FIO2 (%) - Nasal Cannula Nasal Cannula O2 Flow Rate (L/min) O2 Device Patient Position - Orthostatic VS Iris Coma Scale Score Pain    02/26/25 0950 -- -- -- -- -- -- -- -- -- -- -- 6    02/26/25 0947 -- -- -- -- -- 94 % 24 1 L/min Nasal cannula -- -- 6    02/26/25 08:32:34 98.2 °F (36.8 °C) 90 18 137/79 98 96 % -- -- -- -- -- --    02/26/25 0244 -- -- -- -- -- -- -- -- -- -- -- 7    02/26/25 02:41:34 98.1 °F (36.7 °C) 89 -- -- -- 95 % -- -- -- -- -- --    02/25/25 23:02:48 99 °F (37.2 °C) 94 20 120/58 79 92 % -- -- -- -- -- --    02/25/25 2137 -- -- -- -- -- 92 % 28 2 L/min Nasal cannula -- -- --    02/25/25 2125 -- -- -- 105/57 -- 87 % -- -- None (Room air) -- -- --    02/25/25 2057 -- -- -- -- -- -- -- -- -- -- 14 --    02/25/25 20:51:21 99.1 °F (37.3 °C) 94 20 109/59 76 91 % -- -- -- -- -- --    02/25/25 2015 -- 99 22 127/60 86 94 % -- -- None (Room air) Sitting -- --    02/25/25 2000 -- 101 24 130/65 89 94 % -- -- None (Room air) Sitting -- --    02/25/25 1930 -- 100 30 128/72 -- 95 % -- -- -- -- -- --    02/25/25 1845 -- 100 27 -- -- 93 % -- -- -- -- -- --    02/25/25 1830 -- 97 32 -- -- 94 % -- -- -- -- -- --    02/25/25 1820 -- -- -- -- -- -- -- -- -- -- 15 --    02/25/25 1815 -- 101 19 128/59 87 94 % -- -- -- -- -- --    02/25/25 1812 103 °F (39.4 °C) 101 18 126/61 -- 96 % -- -- None (Room air) Lying -- --              Pertinent Labs/Diagnostic Test Results:   Radiology:  XR chest 1 view portable   ED Interpretation by Moises Wharton DO (02/25 1955)   No acute cardiopulmonary disease      Final Interpretation by Sreekanth Alexander MD (02/25 2000)      Interstitial and vascular prominence in the lungs suggesting mild CHF. Correlate with clinical scenario.      Findings slightly discrepant from the ER interpretation. The study was marked in EPIC for immediate notification.      Workstation  performed: [id number]         CT head without contrast   Final Interpretation by Pallav N Shah, MD (02/25 1853)      No acute intracranial abnormality.                  Workstation performed: [id number]           Cardiology:  ECG 12 lead   Final Result by Keanu Mcneil MD (02/26 0932)   Sinus rhythm with occasional Premature ventricular complexes   Possible Left atrial enlargement   Prolonged QT   Abnormal ECG   When compared with ECG of 03-Feb-2025 19:19,   Premature ventricular complexes are now Present   Confirmed by Keanu Mcneil ([id number]) on 2/26/2025 9:32:43 AM            Results from last 7 days   Lab Units 02/26/25  0522 02/25/25  1824   WBC Thousand/uL 0.96* 1.76*   HEMOGLOBIN g/dL 8.1* 8.2*   HEMATOCRIT % 25.9* 26.4*   PLATELETS Thousands/uL 62* 68*   TOTAL NEUT ABS Thousands/µL 0.68* 1.44*         Results from last 7 days   Lab Units 02/26/25  0522 02/25/25  1824   SODIUM mmol/L 137 136   POTASSIUM mmol/L 3.8 4.0   CHLORIDE mmol/L 103 101   CO2 mmol/L 32 29   ANION GAP mmol/L 2* 6   BUN mg/dL 10 9   CREATININE mg/dL 1.01 0.95   EGFR ml/min/1.73sq m 62 67   CALCIUM mg/dL 8.1* 8.3*   MAGNESIUM mg/dL 2.2 1.5*   PHOSPHORUS mg/dL  --  2.9     Results from last 7 days   Lab Units 02/26/25  0522 02/25/25  1824   AST U/L 56* 49*   ALT U/L 17 16   ALK PHOS U/L 191* 206*   TOTAL PROTEIN g/dL 6.6 6.9   ALBUMIN g/dL 2.7* 2.7*   TOTAL BILIRUBIN mg/dL 1.79* 2.03*   AMMONIA umol/L 60 86*     Results from last 7 days   Lab Units 02/25/25  1814   POC GLUCOSE mg/dl 105     Results from last 7 days   Lab Units 02/26/25  0522 02/25/25  1824   GLUCOSE RANDOM mg/dL 116 102     Results from last 7 days   Lab Units 02/26/25 0522   PH RAMYA  7.447*   PCO2 RAMYA mm Hg 39.0*   PO2 RAMYA mm Hg 48.6*   HCO3 RAMYA mmol/L 26.3   BASE EXC RAMYA mmol/L 2.2   O2 CONTENT RAMYA ml/dL 10.3   O2 HGB, VENOUS % 79.6         Results from last 7 days   Lab Units 02/25/25  1824   CK TOTAL U/L 43     Results from last 7 days   Lab Units 02/25/25  1824   HS TNI 0HR  ng/L 7         Results from last 7 days   Lab Units 02/25/25  1824   PROTIME seconds 18.3*   INR  1.48*   PTT seconds 34         Results from last 7 days   Lab Units 02/25/25  1824   PROCALCITONIN ng/ml <0.05     Results from last 7 days   Lab Units 02/25/25  1824   LACTIC ACID mmol/L 1.6     Results from last 7 days   Lab Units 02/25/25  1824   BNP pg/mL 88     Results from last 7 days   Lab Units 02/25/25  1824   FERRITIN ng/mL 29     Results from last 7 days   Lab Units 02/25/25  1824   LIPASE u/L 11         Results from last 7 days   Lab Units 02/25/25  2108   CLARITY UA  Clear   COLOR UA  Yellow   SPEC GRAV UA  1.015   PH UA  8.5*   GLUCOSE UA mg/dl Negative   KETONES UA mg/dl Negative   BLOOD UA  Negative   PROTEIN UA mg/dl Negative   NITRITE UA  Negative   BILIRUBIN UA  Negative   UROBILINOGEN UA E.U./dl 1.0   LEUKOCYTES UA  Negative     Results from last 7 days   Lab Units 02/25/25  2108   AMPH/METH  Negative   BARBITURATE UR  Negative   BENZODIAZEPINE UR  Negative   COCAINE UR  Negative   METHADONE URINE  Negative   OPIATE UR  Negative   PCP UR  Negative   THC UR  Negative     Results from last 7 days   Lab Units 02/25/25  1824   ETHANOL LVL mg/dL <10     Results from last 7 days   Lab Units 02/25/25  1824   BLOOD CULTURE  Received in Microbiology Lab. Culture in Progress.  Received in Microbiology Lab. Culture in Progress.         Past Medical History:   Diagnosis Date    Back pain     Carcinoid tumor     neuroendocrine    CHF (congestive heart failure) (HCC)     Cirrhosis of liver (HCC)     Hypertension     Sarcoidosis      Present on Admission:   Liver cirrhosis secondary to HYATT (nonalcoholic steatohepatitis) (HCC)   Primary hypertension   Sarcoidosis   Fibromyalgia   Pancytopenia (HCC)   Class 3 severe obesity due to excess calories in adult (HCC)   Carcinoid tumor of pancreas   Sepsis (HCC)   Hyperammonemia (HCC)   Iron deficiency anemia   Hypomagnesemia   Acute metabolic encephalopathy   Acute  respiratory failure (HCC)      Admitting Diagnosis: Hepatic encephalopathy (HCC) [K76.82]  Hypomagnesemia [E83.42]  Altered mental status [R41.82]  Anemia [D64.9]  Thrombocytopenia (HCC) [D69.6]  Fever [R50.9]  Neutropenia (HCC) [D70.9]  AMS (altered mental status) [R41.82]  Age/Sex: 54 y.o. female    Network Utilization Review Department  ATTENTION: Please call with any questions or concerns to [phone] and carefully listen to the prompts so that you are directed to the right person. All voicemails are confidential.   For Discharge needs, contact Care Management DC Support Team at [phone] opt. [phone]  Send all requests for admission clinical reviews, approved or denied determinations and any other requests to dedicated fax number below belonging to the campus where the patient is receiving treatment. List of dedicated fax numbers for the Facilities:  FACILITY NAME UR FAX NUMBER   ADMISSION DENIALS (Administrative/Medical Necessity) [phone]   DISCHARGE SUPPORT TEAM (NETWORK) [phone]   PARENT CHILD HEALTH (Maternity/NICU/Pediatrics) [phone]   [facility] [phone]   [facility] [phone]   [facility] [phone]   [facility] [phone]   [facility] [phone]   [facility] [phone]   [facility] [phone]   [facility] [phone]   [facility] [phone]   [facility] [phone]   [facility] [phone]   [facility] [phone]

## 2025-02-26 NOTE — ASSESSMENT & PLAN NOTE
Patient's BMI noted to be 47.07 on admission; during previous hospitalization from 2/3-2/13 BMI noted to be 45.31  Recommend lifestyle changes with increased green leafy vegetables and fruits with decreased red meat and processed foods  Recommend moderate to vigorous intensity exercise for 30 minutes at least 5 days a week or ~150min/week

## 2025-02-26 NOTE — ASSESSMENT & PLAN NOTE
MELD-Na 15, Child Wilder class C  Ascites  Diuretics on lasix and aldactone, continue regimen  Low Na diet, 2g/day  Hepatic encephalopathy  Pt presents encephalopathic without clear cause. Possibly related to constipation and opiates for pain as she is not having multiple BMs daily. Improving with medications at this time  US or r/o ascites. Paracentesis to r/o SBP  No EV noted on recent EGD 1/18/25  No noted change in meds- unclear complaince  On chronic opiates  Has BID pantoprazole on her med list- unclear if she is taking this. No bleeding lesions noted on recent EGD so this could be stopped if HE is not improving  Avoid opiates as able and continue xifaxan and lactulose  Titrate lactulose to 3-4 loose to soft BMs daily  Esophageal varices  EGD 1/18/25 no EV  HCC screening  CT A/P no mass 12/2/24

## 2025-02-26 NOTE — ASSESSMENT & PLAN NOTE
Lab Results   Component Value Date    MG 1.5 (L) 02/25/2025    MG 1.7 (L) 02/13/2025    MG 1.9 02/11/2025   Magnesium noted to be 1.5 on admission  Give mag sulfate 2 g IV x 2  Monitor magnesium   Patient tolerated procedure well. Patient tolerated procedure well.

## 2025-02-26 NOTE — ASSESSMENT & PLAN NOTE
Noted hx of HTN  Home medications atenolol, midodrine, spironolactone, torsemide  Continue home medications  Blood pressure noted to be 126/61 on admission, current blood pressure is 128/59  Monitor blood pressure

## 2025-02-26 NOTE — ASSESSMENT & PLAN NOTE
Pt currently on 2 L NC at 95%  Pt denies any baseline home oxygen needs  VBG pending   Wean oxygen as able

## 2025-02-26 NOTE — QUICK NOTE
Admitted with hepatic encephalopathy.  Mentation improving ammonia trending down with current treatment.  Continue with lactulose 20 g 4 times a day with aim for 3-4 soft stools daily.  Continue with rifaximin.  Continue with current dose of torsemide.  Judicious use of narcotics.  Rest of the management as documented in H&P today.pt/ot eval

## 2025-02-26 NOTE — CASE MANAGEMENT
Case Management Assessment & Discharge Planning Note    Patient name Bailey Oslen  Location /-01 MRN [id number]  : 1970 Date 2025       Current Admission Date: 2025  Current Admission Diagnosis:Sepsis (HCC)   Patient Active Problem List    Diagnosis Date Noted Date Diagnosed    Acute respiratory failure (HCC) 2025     Carcinoid tumor of pancreas 2025     Sepsis (HCC) 2025     Hypomagnesemia 2025     Upper GI bleed 2025     Acute anemia 2025     Rectal bleeding 2025     Acute blood loss anemia 2025     Chronic, continuous use of opioids 2025     Acute metabolic encephalopathy 2025     SIRS (systemic inflammatory response syndrome) ([facility]) 2025     Sacral fracture, closed ([facility]) 11/15/2024     KISHORE (acute kidney injury) ([facility]) 10/04/2024     Morbid obesity with BMI of 40.0-44.9, adult ([facility]) 10/04/2024     Pathological fracture of vertebra due to secondary osteoporosis ([facility]) 2024     Fall from standing 2024     Closed compression fracture of L1 lumbar vertebra, sequela 2024     Closed stable burst fracture of first lumbar vertebra with routine healing 2024     Groin hematoma 2024     Ambulatory dysfunction 2024     Intractable back pain 2024     Hyperammonemia (HCC) 2024     Hepatic encephalopathy (HCC) 2024     Fall 2024     Liver cirrhosis secondary to HYATT (nonalcoholic steatohepatitis) ([facility]) 2024     History of malignant carcinoid tumor of small intestine 2024     Class 3 severe obesity due to excess calories in adult ([facility]) 2024     Primary hypertension 2024     Hypokalemia 2024     Fibromyalgia 2024     Iron deficiency anemia 2024     Pancytopenia (HCC) 2024     Sarcoidosis 2024     Bilateral pulmonary infiltrates 2024       LOS (days): 1  Geometric Mean LOS (GMLOS) (days):   Days to GMLOS:      OBJECTIVE:  PATIENT READMITTED TO HOSPITAL  Risk of Unplanned Readmission Score: 71.22         Current admission status: Inpatient       Preferred Pharmacy:   [id number] [place] [facility] [address]  [address] [address]  Phone: [phone] Fax: [phone]    [id number] [place] [facility] [address]  [address] [address]  Phone: [phone] Fax: [phone]    Primary Care Provider: Nayely Brown DO    Primary Insurance: BLUE CROSS  Secondary Insurance: MEDICARE    ASSESSMENT:  Active Health Care Proxies       Yanet Olsen Health Care Representative - Spouse   Primary Phone: [phone] (Mobile)                 Advance Directives  Does patient have a Health Care POA?: No  Was patient offered paperwork?: Yes (declined)  Does patient currently have a Health Care decision maker?: Yes, please see Health Care Proxy section (, Yanet)  Does patient have Advance Directives?: No  Was patient offered paperwork?: Yes (declined)  Primary Contact: , yanet         Readmission Root Cause  30 Day Readmission: Yes  During your hospital stay, did someone (provider, nurse, ) explain your care to you in a way you could understand?: Yes  Did you feel medically stable to leave the hospital?: Yes  Were you able to pay for your medication at the pharmacy?: Yes  Did you have reliable transportation to take you to your appointments?: Yes  During previous admission, was a post-acute recommendation made?: Yes  What post-acute resources were offered?: [facility]  Patient was readmitted due to: confusion, low NA, Low mag  Action Plan: pt being referred to high utilizer    Patient Information  Admitted from:: Home  Mental Status: Alert  During Assessment patient was accompanied by: Not accompanied during assessment  Assessment information provided by:: Patient, Other - please comment (chart)  Primary Caregiver:  Self  Support Systems: Spouse/significant other, Family members, Friends/neighbors  [place] Residence: [place]  Home entry access options. Select all that apply.: Stairs  Number of steps to enter home.: 1  Type of Current Residence: 2 story home  Upon entering residence, is there a bedroom on the main floor (no further steps)?: Yes  Upon entering residence, is there a bathroom on the main floor (no further steps)?: Yes  Living Arrangements: Lives w/ Spouse/significant other    Activities of Daily Living Prior to Admission  Functional Status: Assistance  Completes ADLs independently?: No  Level of ADL dependence: Assistance  Ambulates independently?: Yes  Does patient use assisted devices?: Yes  Assisted Devices (DME) used: Walker  Does patient currently own DME?: Yes  What DME does the patient currently own?: Walker, Straight Cane, Bedside Commode  Does patient have a history of Outpatient Therapy (PT/OT)?: No  Does the patient have a history of Short-Term Rehab?: Yes (arely [facility])  Does patient have a history of HHC?: Yes ([facility], [facility])  Does patient currently have HHC?: Yes ([facility])    Current Home Health Care  Type of Current Home Care Services: Home PT  Home Health Agency Name:: [facility]  Current Home Health Follow-Up Provider:: PCP    Patient Information Continued  Income Source: SSI/SSD  Does patient have prescription coverage?: Yes  Does patient receive dialysis treatments?: No  Does patient have a history of substance abuse?: No  Does patient have a history of Mental Health Diagnosis?: No         Means of Transportation  Means of Transport to Appts:: Family transport          DISCHARGE DETAILS:       Freedom of Choice: Yes  Comments - Freedom of Choice: pt wishes to return home with HHC  CM contacted family/caregiver?: No- see comments (declined)             Contacts  Patient Contacts: Cristian Olsen- spouse  Relationship to Patient:: Family    Requested Home Health Care          Home Health Agency Name:: [facility]              Would you like to participate in our [facility] service program?  : No - Declined              Pt is from home, lives with .  Pt is active with [facility]

## 2025-02-27 ENCOUNTER — APPOINTMENT (INPATIENT)
Dept: CT IMAGING | Facility: HOSPITAL | Age: 55
DRG: 871 | End: 2025-02-27
Payer: COMMERCIAL

## 2025-02-27 PROBLEM — R78.81 GRAM-NEGATIVE BACTEREMIA: Status: ACTIVE | Noted: 2025-02-27

## 2025-02-27 LAB
ALBUMIN SERPL BCG-MCNC: 2.7 G/DL (ref 3.5–5)
ALP SERPL-CCNC: 187 U/L (ref 34–104)
ALT SERPL W P-5'-P-CCNC: 17 U/L (ref 7–52)
AMMONIA PLAS-SCNC: 25 UMOL/L (ref 18–72)
ANION GAP SERPL CALCULATED.3IONS-SCNC: 2 MMOL/L (ref 4–13)
ANISOCYTOSIS BLD QL SMEAR: PRESENT
AST SERPL W P-5'-P-CCNC: 58 U/L (ref 13–39)
BASOPHILS # BLD MANUAL: 0.02 THOUSAND/UL (ref 0–0.1)
BASOPHILS NFR MAR MANUAL: 2 % (ref 0–1)
BILIRUB SERPL-MCNC: 1.34 MG/DL (ref 0.2–1)
BUN SERPL-MCNC: 10 MG/DL (ref 5–25)
CALCIUM ALBUM COR SERPL-MCNC: 8.8 MG/DL (ref 8.3–10.1)
CALCIUM SERPL-MCNC: 7.8 MG/DL (ref 8.4–10.2)
CHLORIDE SERPL-SCNC: 101 MMOL/L (ref 96–108)
CO2 SERPL-SCNC: 31 MMOL/L (ref 21–32)
CREAT SERPL-MCNC: 0.98 MG/DL (ref 0.6–1.3)
EOSINOPHIL # BLD MANUAL: 0 THOUSAND/UL (ref 0–0.4)
EOSINOPHIL NFR BLD MANUAL: 0 % (ref 0–6)
ERYTHROCYTE [DISTWIDTH] IN BLOOD BY AUTOMATED COUNT: 18.5 % (ref 11.6–15.1)
GFR SERPL CREATININE-BSD FRML MDRD: 65 ML/MIN/1.73SQ M
GLUCOSE SERPL-MCNC: 107 MG/DL (ref 65–140)
HCT VFR BLD AUTO: 26.8 % (ref 34.8–46.1)
HGB BLD-MCNC: 8.4 G/DL (ref 11.5–15.4)
LG PLATELETS BLD QL SMEAR: PRESENT
LYMPHOCYTES # BLD AUTO: 0.25 THOUSAND/UL (ref 0.6–4.47)
LYMPHOCYTES # BLD AUTO: 27 % (ref 14–44)
MCH RBC QN AUTO: 30.3 PG (ref 26.8–34.3)
MCHC RBC AUTO-ENTMCNC: 31.3 G/DL (ref 31.4–37.4)
MCV RBC AUTO: 97 FL (ref 82–98)
MICROCYTES BLD QL AUTO: PRESENT
MONOCYTES # BLD AUTO: 0.08 THOUSAND/UL (ref 0–1.22)
MONOCYTES NFR BLD: 8 % (ref 4–12)
NEUTROPHILS # BLD MANUAL: 0.59 THOUSAND/UL (ref 1.85–7.62)
NEUTS BAND NFR BLD MANUAL: 1 % (ref 0–8)
NEUTS HYPERSEG BLD QL SMEAR: PRESENT
NEUTS SEG NFR BLD AUTO: 62 % (ref 43–75)
PLATELET # BLD AUTO: 57 THOUSANDS/UL (ref 149–390)
PLATELET BLD QL SMEAR: ABNORMAL
PLATELET CLUMP BLD QL SMEAR: PRESENT
PMV BLD AUTO: 11.8 FL (ref 8.9–12.7)
POTASSIUM SERPL-SCNC: 3.5 MMOL/L (ref 3.5–5.3)
PROT SERPL-MCNC: 6.7 G/DL (ref 6.4–8.4)
RBC # BLD AUTO: 2.77 MILLION/UL (ref 3.81–5.12)
RBC MORPH BLD: PRESENT
SODIUM SERPL-SCNC: 134 MMOL/L (ref 135–147)
WBC # BLD AUTO: 0.94 THOUSAND/UL (ref 4.31–10.16)

## 2025-02-27 PROCEDURE — 82140 ASSAY OF AMMONIA: CPT | Performed by: INTERNAL MEDICINE

## 2025-02-27 PROCEDURE — 85027 COMPLETE CBC AUTOMATED: CPT | Performed by: INTERNAL MEDICINE

## 2025-02-27 PROCEDURE — 97166 OT EVAL MOD COMPLEX 45 MIN: CPT

## 2025-02-27 PROCEDURE — 71260 CT THORAX DX C+: CPT

## 2025-02-27 PROCEDURE — 99232 SBSQ HOSP IP/OBS MODERATE 35: CPT | Performed by: INTERNAL MEDICINE

## 2025-02-27 PROCEDURE — 97162 PT EVAL MOD COMPLEX 30 MIN: CPT

## 2025-02-27 PROCEDURE — 80053 COMPREHEN METABOLIC PANEL: CPT | Performed by: INTERNAL MEDICINE

## 2025-02-27 PROCEDURE — 85007 BL SMEAR W/DIFF WBC COUNT: CPT | Performed by: INTERNAL MEDICINE

## 2025-02-27 PROCEDURE — 97116 GAIT TRAINING THERAPY: CPT

## 2025-02-27 PROCEDURE — 74177 CT ABD & PELVIS W/CONTRAST: CPT

## 2025-02-27 RX ORDER — CEFTRIAXONE 2 G/50ML
2000 INJECTION, SOLUTION INTRAVENOUS EVERY 24 HOURS
Status: DISCONTINUED | OUTPATIENT
Start: 2025-02-27 | End: 2025-03-01 | Stop reason: HOSPADM

## 2025-02-27 RX ADMIN — LACTULOSE 30 G: 20 SOLUTION ORAL at 12:14

## 2025-02-27 RX ADMIN — DAPSONE 50 MG: 25 TABLET ORAL at 08:39

## 2025-02-27 RX ADMIN — ACETAMINOPHEN 325MG 650 MG: 325 TABLET ORAL at 21:08

## 2025-02-27 RX ADMIN — ACETAMINOPHEN 325MG 650 MG: 325 TABLET ORAL at 05:14

## 2025-02-27 RX ADMIN — TORSEMIDE 20 MG: 20 TABLET ORAL at 08:37

## 2025-02-27 RX ADMIN — PANTOPRAZOLE SODIUM 40 MG: 40 TABLET, DELAYED RELEASE ORAL at 05:14

## 2025-02-27 RX ADMIN — MIDODRINE HYDROCHLORIDE 10 MG: 5 TABLET ORAL at 17:08

## 2025-02-27 RX ADMIN — LACTULOSE 30 G: 20 SOLUTION ORAL at 17:08

## 2025-02-27 RX ADMIN — ATENOLOL 25 MG: 25 TABLET ORAL at 08:37

## 2025-02-27 RX ADMIN — SPIRONOLACTONE 25 MG: 25 TABLET, FILM COATED ORAL at 08:37

## 2025-02-27 RX ADMIN — URSODIOL 300 MG: 300 CAPSULE ORAL at 08:36

## 2025-02-27 RX ADMIN — RIFAXIMIN 550 MG: 550 TABLET ORAL at 21:08

## 2025-02-27 RX ADMIN — LACTULOSE 30 G: 20 SOLUTION ORAL at 08:39

## 2025-02-27 RX ADMIN — TORSEMIDE 20 MG: 20 TABLET ORAL at 17:08

## 2025-02-27 RX ADMIN — CEFTRIAXONE 2000 MG: 2 INJECTION, SOLUTION INTRAVENOUS at 08:44

## 2025-02-27 RX ADMIN — FOLIC ACID 1 MG: 1 TABLET ORAL at 08:37

## 2025-02-27 RX ADMIN — POTASSIUM CHLORIDE 40 MEQ: 1500 TABLET, EXTENDED RELEASE ORAL at 08:36

## 2025-02-27 RX ADMIN — URSODIOL 300 MG: 300 CAPSULE ORAL at 17:08

## 2025-02-27 RX ADMIN — IOHEXOL 100 ML: 350 INJECTION, SOLUTION INTRAVENOUS at 12:40

## 2025-02-27 RX ADMIN — RIFAXIMIN 550 MG: 550 TABLET ORAL at 08:36

## 2025-02-27 NOTE — PLAN OF CARE
Problem: PHYSICAL THERAPY ADULT  Goal: Performs mobility at highest level of function for planned discharge setting.  See evaluation for individualized goals.  Description: Treatment/Interventions: ADL retraining, Functional transfer training, LE strengthening/ROM, Elevations, Therapeutic exercise, Endurance training, Patient/family training, Equipment eval/education, Bed mobility, Gait training, Compensatory technique education, Spoke to nursing, Spoke to case management, OT  Equipment Recommended:  (walker-pt has)       See flowsheet documentation for full assessment, interventions and recommendations.  Note: Prognosis: Good  Problem List: Decreased strength, Impaired balance, Decreased mobility, Decreased safety awareness, Impaired judgement, Obesity, Pain  Assessment: Pt is a 54 y.o. female seen for PT evaluation s/p admission to [facility] on 2/25/2025 with Sepsis (HCC).  Order placed for PT services.  Upon evaluation: Pt is presenting with impaired functional mobility due to pain, decreased strength, decreased endurance, impaired balance, gait deviations, decreased safety awareness, fall risk, and LE edema requiring  supervision assistance for bed mobility, supervision assistance for transfers, and supervision assistance for ambulation with RW . Pt's clinical presentation is currently evolving given the functional mobility deficits above, especially weakness, edema of extremities, impaired balance, gait deviations, pain, decreased safety awareness, and impaired judgement, coupled with fall risks as indicated by AM-PAC 6-Clicks: 19/24 as well as hx of falls, impaired balance, polypharmacy, impaired judgement, decreased safety awareness, and obesity and combined with medical complications of pain impacting overall mobility status, abnormal H&H, abnormal sodium values, multiple readmissions, need for input for mobility technique/safety, and abnormal blood gas, sepsis, acute metabolic  encephalopathy on admission, hyperammonemia, hypomagnesemia, acute respiratory failure on admission, gram-negative bacteremia, carcinoid tumor, CHF, GI bleed, depression, anxiety, chronic pain .  Pt's PMHx and comorbidities that may affect physical performance and progress include: Fibromyalgia, HTN, obesity, and liver cirrhosis due to HYATT, ANGELICA, pancytopenia, sarcoidosis . Personal factors affecting pt at time of IE include: questionable non-compliance, step(s) to enter environment, inability to perform IADLs, inability to perform ADLs, inability to navigate level surfaces without external assistance, and recent fall(s)/fall history. Pt will benefit from continued skilled PT services to address deficits as defined above and to maximize level of functional mobility to facilitate return toward PLOF and improved QOL. From PT/mobility standpoint, recommendation at time of d/c would be Level III (Minimum Resource Intensity), with family and/or caregiver support, and with walker in order to reduce fall risk and maximize pt's functional independence and consistency with mobility. Recommend trial with walker next 1-2 sessions and ther ex next 1-2 sessions.     Barriers to Discharge Comments: FABRICIO home, fall risk, safety cocnerns with consistent RW use. frequent admissions  Rehab Resource Intensity Level, PT: III (Minimum Resource Intensity)    See flowsheet documentation for full assessment.

## 2025-02-27 NOTE — PLAN OF CARE
Problem: RESPIRATORY - ADULT  Goal: Achieves optimal ventilation and oxygenation  Description: INTERVENTIONS:  - Assess for changes in respiratory status  - Assess for changes in mentation and behavior  - Position to facilitate oxygenation and minimize respiratory effort  - Oxygen administered by appropriate delivery if ordered  - Initiate smoking cessation education as indicated  - Encourage broncho-pulmonary hygiene including cough, deep breathe, Incentive Spirometry  - Assess the need for suctioning and aspirate as needed  - Assess and instruct to report SOB or any respiratory difficulty  - Respiratory Therapy support as indicated  2/27/2025 1251 by Sandra Coy RN  Outcome: Progressing  2/27/2025 1251 by Sandra Coy RN  Outcome: Progressing     Problem: GASTROINTESTINAL - ADULT  Goal: Minimal or absence of nausea and/or vomiting  Description: INTERVENTIONS:  - Administer IV fluids if ordered to ensure adequate hydration  - Maintain NPO status until nausea and vomiting are resolved  - Nasogastric tube if ordered  - Administer ordered antiemetic medications as needed  - Provide nonpharmacologic comfort measures as appropriate  - Advance diet as tolerated, if ordered  - Consider nutrition services referral to assist patient with adequate nutrition and appropriate food choices  2/27/2025 1251 by Sandra Coy RN  Outcome: Progressing  2/27/2025 1251 by Sandra Coy RN  Outcome: Progressing  Goal: Maintains or returns to baseline bowel function  Description: INTERVENTIONS:  - Assess bowel function  - Encourage oral fluids to ensure adequate hydration  - Administer IV fluids if ordered to ensure adequate hydration  - Administer ordered medications as needed  - Encourage mobilization and activity  - Consider nutritional services referral to assist patient with adequate nutrition and appropriate food choices  2/27/2025 1251 by Sandra Coy RN  Outcome: Progressing  2/27/2025 1251 by Sandra Coy RN  Outcome:  Progressing     Problem: GENITOURINARY - ADULT  Goal: Maintains or returns to baseline urinary function  Description: INTERVENTIONS:  - Assess urinary function  - Encourage oral fluids to ensure adequate hydration if ordered  - Administer IV fluids as ordered to ensure adequate hydration  - Administer ordered medications as needed  - Offer frequent toileting  - Follow urinary retention protocol if ordered  2/27/2025 1251 by Sandra Coy RN  Outcome: Progressing  2/27/2025 1251 by Sandra Coy RN  Outcome: Progressing     Problem: METABOLIC, FLUID AND ELECTROLYTES - ADULT  Goal: Electrolytes maintained within normal limits  Description: INTERVENTIONS:  - Monitor labs and assess patient for signs and symptoms of electrolyte imbalances  - Administer electrolyte replacement as ordered  - Monitor response to electrolyte replacements, including repeat lab results as appropriate  - Instruct patient on fluid and nutrition as appropriate  2/27/2025 1251 by Sandra Coy RN  Outcome: Progressing  2/27/2025 1251 by Sandra Coy RN  Outcome: Progressing  Goal: Fluid balance maintained  Description: INTERVENTIONS:  - Monitor labs   - Monitor I/O and WT  - Instruct patient on fluid and nutrition as appropriate  - Assess for signs & symptoms of volume excess or deficit  2/27/2025 1251 by Sandra Coy RN  Outcome: Progressing  2/27/2025 1251 by Sandra Coy RN  Outcome: Progressing     Problem: PAIN - ADULT  Goal: Verbalizes/displays adequate comfort level or baseline comfort level  Description: Interventions:  - Encourage patient to monitor pain and request assistance  - Assess pain using appropriate pain scale  - Administer analgesics based on type and severity of pain and evaluate response  - Implement non-pharmacological measures as appropriate and evaluate response  - Consider cultural and social influences on pain and pain management  - Notify physician/advanced practitioner if interventions unsuccessful or patient reports new  pain  2/27/2025 1251 by Sandra Coy RN  Outcome: Progressing  2/27/2025 1251 by Sandra Coy RN  Outcome: Progressing     Problem: INFECTION - ADULT  Goal: Absence or prevention of progression during hospitalization  Description: INTERVENTIONS:  - Assess and monitor for signs and symptoms of infection  - Monitor lab/diagnostic results  - Monitor all insertion sites, i.e. indwelling lines, tubes, and drains  - Monitor endotracheal if appropriate and nasal secretions for changes in amount and color  - [place] appropriate cooling/warming therapies per order  - Administer medications as ordered  - Instruct and encourage patient and family to use good hand hygiene technique  - Identify and instruct in appropriate isolation precautions for identified infection/condition  2/27/2025 1251 by Sandra Coy RN  Outcome: Progressing  2/27/2025 1251 by Sandra Coy RN  Outcome: Progressing     Problem: SAFETY ADULT  Goal: Patient will remain free of falls  Description: INTERVENTIONS:  - Educate patient/family on patient safety including physical limitations  - Instruct patient to call for assistance with activity   - Consult OT/PT to assist with strengthening/mobility   - Keep Call bell within reach  - Keep bed low and locked with side rails adjusted as appropriate  - Keep care items and personal belongings within reach  - Initiate and maintain comfort rounds  - Make Fall Risk Sign visible to staff  - Offer Toileting every 2 Hours, in advance of need  - Initiate/Maintain bed/chair alarm  - Obtain necessary fall risk management equipment: rw  - Apply yellow socks and bracelet for high fall risk patients  - Consider moving patient to room near nurses station  2/27/2025 1251 by Sandra Coy RN  Outcome: Progressing  2/27/2025 1251 by Sandra Coy RN  Outcome: Progressing  Goal: Maintain or return to baseline ADL function  Description: INTERVENTIONS:  -  Assess patient's ability to carry out ADLs; assess patient's baseline for ADL  function and identify physical deficits which impact ability to perform ADLs (bathing, care of mouth/teeth, toileting, grooming, dressing, etc.)  - Assess/evaluate cause of self-care deficits   - Assess range of motion  - Assess patient's mobility; develop plan if impaired  - Assess patient's need for assistive devices and provide as appropriate  - Encourage maximum independence but intervene and supervise when necessary  - Involve family in performance of ADLs  - Assess for home care needs following discharge   - Consider OT consult to assist with ADL evaluation and planning for discharge  - Provide patient education as appropriate  2/27/2025 1251 by Sandra Coy RN  Outcome: Progressing  2/27/2025 1251 by Sandra Coy RN  Outcome: Progressing  Goal: Maintains/Returns to pre admission functional level  Description: INTERVENTIONS:  - Perform AM-PAC 6 Click Basic Mobility/ Daily Activity assessment daily.  - Set and communicate daily mobility goal to care team and patient/family/caregiver.   - Collaborate with rehabilitation services on mobility goals if consulted  - Perform Range of Motion 2 times a day.  - Reposition patient every 2 hours.  - Dangle patient 3 times a day  - Stand patient 3 times a day  - Ambulate patient 3 times a day  - Out of bed to chair 3 times a day   - Out of bed for meals 3 times a day  - Out of bed for toileting  - Record patient progress and toleration of activity level   2/27/2025 1251 by Sandra Coy RN  Outcome: Progressing  2/27/2025 1251 by Sandra Coy RN  Outcome: Progressing     Problem: DISCHARGE PLANNING  Goal: Discharge to home or other facility with appropriate resources  Description: INTERVENTIONS:  - Identify barriers to discharge w/patient and caregiver  - Arrange for needed discharge resources and transportation as appropriate  - Identify discharge learning needs (meds, wound care, etc.)  - Arrange for interpretive services to assist at discharge as needed  - Refer to Case  Management Department for coordinating discharge planning if the patient needs post-hospital services based on physician/advanced practitioner order or complex needs related to functional status, cognitive ability, or social support system  2/27/2025 1251 by Sandra Coy RN  Outcome: Progressing  2/27/2025 1251 by Sandra Coy RN  Outcome: Progressing     Problem: Knowledge Deficit  Goal: Patient/family/caregiver demonstrates understanding of disease process, treatment plan, medications, and discharge instructions  Description: Complete learning assessment and assess knowledge base.  Interventions:  - Provide teaching at level of understanding  - Provide teaching via preferred learning methods  2/27/2025 1251 by Sandra Coy RN  Outcome: Progressing  2/27/2025 1251 by Sandra Coy RN  Outcome: Progressing

## 2025-02-27 NOTE — OCCUPATIONAL THERAPY NOTE
Occupational Therapy Evaluation     Patient Name: Bailey Olsen  Today's Date: 2/27/2025  Problem List  Principal Problem:    Sepsis (HCC)  Active Problems:    Liver cirrhosis secondary to HYATT (nonalcoholic steatohepatitis) (HCC)    Class 3 severe obesity due to excess calories in adult (HCC)    Primary hypertension    Fibromyalgia    Iron deficiency anemia    Pancytopenia (HCC)    Sarcoidosis    Hyperammonemia (HCC)    Acute metabolic encephalopathy    Carcinoid tumor of pancreas    Hypomagnesemia    Acute respiratory failure (HCC)    Gram-negative bacteremia    Past Medical History  Past Medical History:   Diagnosis Date    Back pain     Carcinoid tumor     neuroendocrine    CHF (congestive heart failure) (HCC)     Cirrhosis of liver (HCC)     Hypertension     Sarcoidosis      Past Surgical History  Past Surgical History:   Procedure Laterality Date    CHOLECYSTECTOMY      GASTRIC BYPASS      HERNIA REPAIR      IR KYPHOPLASTY/VERTEBROPLASTY  10/15/2024        02/27/25 1350   Note Type   Note type Evaluation   Pain Assessment   Pain Assessment Tool 0-10   Pain Score 7   Pain Location/Orientation Orientation: Lower;Location: Back   Restrictions/Precautions   Other Precautions Chair Alarm;Bed Alarm;Fall Risk;Pain   Home Living   Type of Home House  (1 FABRICIO)   Home Layout Two level;Performs ADLs on one level;Able to live on main level with bedroom/bathroom   Bathroom Shower/Tub Tub/shower unit   Bathroom Toilet Raised   Bathroom Equipment Grab bars in shower;Grab bars around toilet   Home Equipment Walker;Cane;Other (Comment)  (Rollator)   Additional Comments Uses RW for functional mobility when she isn't feeling well   Prior Function   Level of [place] Independent with ADLs;Independent with functional mobility;Independent with IADLS   Lives With Spouse;Daughter   Receives Help From Family   IADLs Independent with meal prep;Independent with medication management;Family/Friend/Other provides transportation    Falls in the last 6 months 1 to 4  (2)   ADL   UB Dressing Assistance 6  Modified independent   LB Dressing Assistance 5  Supervision/Setup   LB Dressing Deficit Don/doff R sock;Don/doff L sock   Additional Comments Pt able to doff/don the R sock while seated at EOB. Overall, anticipate pt can complete LB ADLs with setup and increased time. UB ADLs at mod I   Bed Mobility   Sit to Supine 7  Independent   Transfers   Sit to Stand 5  Supervision   Additional items Increased time required;Verbal cues   Stand to Sit 5  Supervision   Additional items Increased time required;Verbal cues   Stand pivot 5  Supervision   Additional items Increased time required;Verbal cues   Additional Comments Using RW   Functional Mobility   Functional Mobility 5  Supervision   Additional Comments Pt participated in long functional household distance in room and in hallway with supervision and RW.   Additional items Rolling walker   Balance   Static Sitting Normal   Dynamic Sitting Fair +   Static Standing Fair -   Dynamic Standing Fair -   Ambulatory Fair -   Activity Tolerance   Activity Tolerance Patient tolerated treatment well   Medical Staff Made Aware PT, Dianna   RUE Assessment   RUE Assessment WFL   LUE Assessment   LUE Assessment WFL   Hand Function   Gross Motor Coordination Functional   Fine Motor Coordination Functional   Hand Function Comments Pt is R hand dominant.   Cognition   Overall Cognitive Status WFL   Arousal/Participation Alert;Cooperative   Attention Within functional limits   Orientation Level Oriented X4   Memory Within functional limits   Following Commands Follows one step commands without difficulty   Assessment   Limitation Decreased self-care trans;Decreased high-level ADLs   Prognosis Good   Assessment Pt is a 54 y.o. female, admitted to [facility]/25/2025 d/t experiencing AMS. Dx: Sepsis. Pt with PMHx impacting their performance during ADL tasks, including: back pain, carcinoid tumor, CHF, cirrhosis of liver  secondary to HYATT, sarcoidosis, cholecystectomy, gastric bypass, kyphoplasty/verterbroplasty, sepsis, acute metabolic encephalopathy, hyperammonia, hypomagnesemia, obesity, HTN, fibromyalgia, iron deficiency anemia, pancytopenia, acute respiratory failure. Prior to admission to the hospital Pt was performing ADLs without physical assistance. Most IADLs without physical assistance, has assistance for transportation. Functional transfers/ambulation without physical assistance. Cognitive status PTA was WFL. OT order placed to assess Pt's ADLs, cognitive status, and performance during functional tasks in order to maximize safety and independence while making most appropriate d/c recommendations. PT/OT co-evaluation completed at this time d/t significant mobility deficits and safety concerns. Pt's clinical presentation is currently evolving given new onset deficits that affect Pt's occupational performance and ability to safely return to PLOF including decrease activity tolerance, decrease performance during ADL tasks, decrease generalized strength, decrease activity engagement, decrease performance during functional transfers, limited family support, high fall risk, and limited insight to deficits combined with medical complications of abnormal renal lab values, abnormal H&H, abnormal CBC, abnormal sodium values, and multiple readmissions. Personal factors affecting Pt at time of initial evaluation include: step(s) to enter environment, multi-level environment, limited home support, inability to perform IADLs, limited insight into impairments, decreased initiation and engagement, recent fall(s)/fall history, and questionable non-compliance. Pt will benefit from continued skilled OT services to address deficits as defined above and to maximize level independence/participation during ADLs and functional tasks to facilitate return toward PLOF and improved quality of life. From an occupational therapy standpoint, Level III  (Minimum Resource Intensity) is recommended upon d/c.   Goals   Patient Goals to go home   Plan   Treatment Interventions Functional transfer training;UE strengthening/ROM;Endurance training;Patient/family training;Equipment evaluation/education;Compensatory technique education;Continued evaluation;Energy conservation;Activityengagement   Goal Expiration Date 03/13/25   OT Frequency 2-3x/wk   Discharge Recommendation   Rehab Resource Intensity Level, OT III (Minimum Resource Intensity)   AM-PAC Daily Activity Inpatient   Lower Body Dressing 3   Bathing 3   Toileting 3   Upper Body Dressing 4   Grooming 4   Eating 4   Daily Activity Raw Score 21   Daily Activity Standardized Score (Calc for Raw Score >=11) 44.27   AM-PAC Applied Cognition Inpatient   Following a Speech/Presentation 3   Understanding Ordinary Conversation 4   Taking Medications 3   Remembering Where Things Are Placed or Put Away 4   Remembering List of 4-5 Errands 3   Taking Care of Complicated Tasks 3   Applied Cognition Raw Score 20   Applied Cognition Standardized Score 41.76   End of Consult   Patient Position at End of Consult Supine;Bed/Chair alarm activated;All needs within reach     The patient's raw score on the AM-PAC Daily Activity Inpatient Short Form is 21. A raw score of greater than or equal to 19 suggests the patient may benefit from discharge to home. Please refer to the recommendation of the Occupational Therapist for safe discharge planning.    Pt goals to be met by 3/13/2025:    Pt will demonstrate ability to complete LB dressing @ mod I in order to increase safety and independence during meaningful tasks.   Pt will demonstrate ability to iain/doff socks/shoes while sitting EOB/chair @ mod I in order to increase safety and independence during meaningful tasks.   Pt will demonstrate ability to complete toileting tasks including CM and pericare @ mod I in order to increase safety and independence during meaningful tasks.  Pt will  demonstrate ability to complete EOB, chair, toilet/commode transfers @ mod I in order to increase performance and participation during functional tasks.  Pt will demonstrate ability to stand for 20 minutes while maintaining F+ balance with use of RW for UB support PRN.  Pt will demonstrate ability to tolerate 30 minute OT session with no vc'ing for deep breathing or use of energy conservation techniques in order to increase activity tolerance during functional tasks.   Pt will demonstrate Good carryover of use of energy conservation/compensatory strategies during ADLs and functional tasks in order to increase safety and reduce risk for falls.   Pt will demonstrate Good attention and participation in continued evaluation of functional ambulation house hold distances in order to assist with safe d/c planning.  Pt will attend to continued cognitive assessments 100% of the time in order to provide most appropriate d/c recommendations.   Pt will follow 100% simple 2-step commands and be A&O x4 consistently with environmental cues to increase participation in functional activities.   Pt will identify 3 areas of interest/hobbies and 1 intervention on how to incorporate into daily life in order to increase interaction with environment and peers as well as increase participation in meaningful tasks.   Pt will demonstrate 100% carryover of BUE HEP in order to increase BUE MS and increase performance during functional tasks upon d/c home.    Ryan Farah, MS, OTR/L

## 2025-02-27 NOTE — ASSESSMENT & PLAN NOTE
Presented with fever and leukopenia  Admission blood culture 1 of 2 bottles with gram-negative rods  Likely in the setting of GI translocation with hepatic encephalopathy  Follow-up on CT scan of the chest abdomen and pelvis  Urine admission unremarkable for urine tract infection  With high-dose IV ceftriaxone  Further titration of antibiotics per culture and sensitivity results  May need colonoscopy in the near future to rule out source of bacteremia

## 2025-02-27 NOTE — ASSESSMENT & PLAN NOTE
"Pt presents with altered mental status  Pt reports taking all home medication  On exam, pt with slow responses but alert & orientation x 4   Chest x-ray: \"Interstitial and vascular prominence in the lungs suggesting mild CHF. Correlate with clinical scenario.\"   CT head: \"No acute intracranial abnormality.\"  Ammonia elevated at 86, trend  Likely etiology of encephalopathy is hepatic encephalopathy in the setting of gram-negative bacteremia  Improving with current administration of lactulose and IV antibiotics.  Ethanol normal < 10  Urine drug screen normal   Appreciate GI recommendations  "

## 2025-02-27 NOTE — ASSESSMENT & PLAN NOTE
Lab Results   Component Value Date    HGB 8.4 (L) 02/27/2025    HGB 8.1 (L) 02/26/2025    HGB 8.2 (L) 02/25/2025    HCT 26.8 (L) 02/27/2025    HCT 25.9 (L) 02/26/2025    HCT 26.4 (L) 02/25/2025    Noted hx   Iron panel pending   Plan to transfuse PRBC if hgb < 7  Trend CBC

## 2025-02-27 NOTE — ASSESSMENT & PLAN NOTE
Lab Results   Component Value Date    MG 2.2 02/26/2025    MG 1.5 (L) 02/25/2025    MG 1.7 (L) 02/13/2025   Magnesium noted to be 1.5 on admission  Give mag sulfate 2 g IV x 2  Monitor magnesium

## 2025-02-27 NOTE — PROGRESS NOTES
"Progress Note - Hospitalist   Name: Bailey Olsen 54 y.o. female I MRN: [id number]  Unit/Bed#: -01 I Date of Admission: 2/25/2025   Date of Service: 2/27/2025 I Hospital Day: 2    Assessment & Plan  Sepsis (HCC)  Met sepsis criteria with temp of 103, tachycardia of 101, tachypnea with of 32, WBC of 1.76  Chest x-ray: \"Interstitial and vascular prominence in the lungs suggesting mild CHF. Correlate with clinical scenario.\"   CT head: \"No acute intracranial abnormality.\"  Procalcitonin normal at < 0.05, lactic acid normal at 1.6  Blood culture 1 of 2 bottles with gram-negative sarahi.  Continue with high-dose IV ceftriaxone  COVID/Flu negative  UA normal   Follow-up with CT chest abdomen and pelvis  Lactic acid normal at 1.6, Procalcitonin normal < 0.05  Fever 103, received Ofirmev 1000 mg IV in the ED  Monitor fever curve, vital signs, and symptoms   Liver cirrhosis secondary to HYATT (nonalcoholic steatohepatitis) (HCC)  Noted hx of HYATT cirrhosis  EGD from 1/2025 no evidence of varices  Home medications: lactulose, rifaximin, carvedilol, and torsemide  Give lasix 40 mg   Continue home medications   Lipase normal at 11, CK normal at 43  Daily weights   ECHO 10/2024: \"Left ventricular cavity size is normal. There is mild concentric hypertrophy. The left ventricular ejection fraction is 60%. Systolic function is normal. Wall motion is normal. Diastolic function is normal. The left atrium is mildly dilated. There is mild mitral valve regurgitation. There is mild tricuspid valve regurgitation. The right ventricular systolic pressure is normal. There is no pericardial effusion.\"  MELD-Na-15  Continue with diuretics, continue with lactulose and titrate to 3-4 soft BMs daily.  Had last variceal screening on 1/18/2025 with no esophageal varices.  Appreciate GI recommendations  Acute metabolic encephalopathy  Pt presents with altered mental status  Pt reports taking all home medication  On exam, pt with slow responses but " "alert & orientation x 4   Chest x-ray: \"Interstitial and vascular prominence in the lungs suggesting mild CHF. Correlate with clinical scenario.\"   CT head: \"No acute intracranial abnormality.\"  Ammonia elevated at 86, trend  Likely etiology of encephalopathy is hepatic encephalopathy in the setting of gram-negative bacteremia  Improving with current administration of lactulose and IV antibiotics.  Ethanol normal < 10  Urine drug screen normal   Appreciate GI recommendations  Hyperammonemia (HCC)  Lab Results   Component Value Date    AMMONIA 25 02/27/2025    AMMONIA 60 02/26/2025    AMMONIA 86 (H) 02/25/2025    AMMONIA 77 (H) 02/12/2025   Ammonia noted to be 86 on admission  Pt reports taking all home medications  Received lactulose 30 g in the ED, continue TID with goal of 3 BMs  Monitor ammonia  Appreciate GI recommendations  Hypomagnesemia  Lab Results   Component Value Date    MG 2.2 02/26/2025    MG 1.5 (L) 02/25/2025    MG 1.7 (L) 02/13/2025   Magnesium noted to be 1.5 on admission  Give mag sulfate 2 g IV x 2  Monitor magnesium  Class 3 severe obesity due to excess calories in adult ([facility])  Patient's BMI noted to be 47.07 on admission; during previous hospitalization from 2/3-2/13 BMI noted to be 45.31  Recommend lifestyle changes with increased green leafy vegetables and fruits with decreased red meat and processed foods  Recommend moderate to vigorous intensity exercise for 30 minutes at least 5 days a week or ~150min/week  Primary hypertension  Noted hx of HTN  Home medications atenolol, midodrine, spironolactone, torsemide  Continue home medications  Blood pressure noted to be 126/61 on admission, current blood pressure is 128/59  Monitor blood pressure  Fibromyalgia  Noted hx of chronic, diffuse pain.   Continue home regimen   Iron deficiency anemia  Lab Results   Component Value Date    HGB 8.4 (L) 02/27/2025    HGB 8.1 (L) 02/26/2025    HGB 8.2 (L) 02/25/2025    HCT 26.8 (L) 02/27/2025    HCT 25.9 (L) " 02/26/2025    HCT 26.4 (L) 02/25/2025    Noted hx   Iron panel pending   Plan to transfuse PRBC if hgb < 7  Trend CBC  Pancytopenia (HCC)  Lab Results   Component Value Date    RBC 2.77 (L) 02/27/2025    RBC 2.65 (L) 02/26/2025    RBC 2.73 (L) 02/25/2025    WBC 0.94 (L) 02/27/2025    WBC 0.96 (L) 02/26/2025    WBC 1.76 (L) 02/25/2025    PLT 57 (L) 02/27/2025    PLT 62 (L) 02/26/2025    PLT 68 (L) 02/25/2025   Likely secondary to liver cirrhosis  Will hold off on DVT prophylaxis, continue SCDs  Sarcoidosis  Noted hx   Hold home dapsone in view of pancytopenia  Monitor counts closely as patient does have baseline pancytopenia  Acute respiratory failure (HCC)  Pt currently on 2 L NC at 95%  Pt denies any baseline home oxygen needs  VBG pending   Wean oxygen as able  Gram-negative bacteremia  Presented with fever and leukopenia  Admission blood culture 1 of 2 bottles with gram-negative rods  Likely in the setting of GI translocation with hepatic encephalopathy  Follow-up on CT scan of the chest abdomen and pelvis  Urine admission unremarkable for urine tract infection  With high-dose IV ceftriaxone  Further titration of antibiotics per culture and sensitivity results  May need colonoscopy in the near future to rule out source of bacteremia    VTE Pharmacologic Prophylaxis: VTE Score: 4 High Risk (Score >/= 5) - Pharmacological DVT Prophylaxis Contraindicated. Sequential Compression Devices Ordered.    Mobility:   Basic Mobility Inpatient Raw Score: 20  JH-HLM Goal: 6: Walk 10 steps or more  JH-HLM Achieved: 6: Walk 10 steps or more  JH-HLM Goal achieved. Continue to encourage appropriate mobility.    Patient Centered Rounds: I performed bedside rounds with nursing staff today.   Discussions with Specialists or Other Care Team Provider: Discussed with nursing    Education and Discussions with Family / Patient: Updated  () via phone.    Current Length of Stay: 2 day(s)  Current Patient Status:  Inpatient   Certification Statement: The patient will continue to require additional inpatient hospital stay due to monitoring of blood cultures and ongoing IV antibiotics  Discharge Plan: Anticipate discharge in 24-48 hrs to home with home services.    Code Status: Level 1 - Full Code    Subjective   Seen and examined at bedside.  Mentation back to baseline.  Remains afebrile.  Denies any worsening abdominal discomfort nausea vomiting.  Reports multiple bowel movements on lactulose.  Denies any urinary symptoms    Objective :  Temp:  [97.9 °F (36.6 °C)-98.8 °F (37.1 °C)] 97.9 °F (36.6 °C)  HR:  [82-93] 83  BP: (109-151)/(63-77) 124/65  Resp:  [17-20] 20  SpO2:  [92 %-98 %] 96 %  O2 Device: None (Room air)  Nasal Cannula O2 Flow Rate (L/min):  [1 L/min] 1 L/min    Body mass index is 43.47 kg/m².     Input and Output Summary (last 24 hours):     Intake/Output Summary (Last 24 hours) at 2/27/2025 1426  Last data filed at 2/27/2025 0842  Gross per 24 hour   Intake 240 ml   Output 580 ml   Net -340 ml       Physical Exam  Constitutional:       Appearance: She is obese.   HENT:      Head: Normocephalic.      Nose: Nose normal.      Mouth/Throat:      Mouth: Mucous membranes are moist.   Eyes:      Extraocular Movements: Extraocular movements intact.      Pupils: Pupils are equal, round, and reactive to light.   Cardiovascular:      Rate and Rhythm: Normal rate and regular rhythm.   Pulmonary:      Effort: Pulmonary effort is normal.   Abdominal:      General: Bowel sounds are normal. There is no distension.      Palpations: Abdomen is soft.      Tenderness: There is no abdominal tenderness.   Musculoskeletal:      Right lower leg: Edema present.      Left lower leg: Edema present.   Skin:     General: Skin is warm.   Neurological:      Mental Status: She is alert and oriented to person, place, and time. Mental status is at baseline.           Lines/Drains:              Lab Results: I have reviewed the following results:    Results from last 7 days   Lab Units 02/27/25 0524 02/26/25  0522   WBC Thousand/uL 0.94* 0.96*   HEMOGLOBIN g/dL 8.4* 8.1*   HEMATOCRIT % 26.8* 25.9*   PLATELETS Thousands/uL 57* 62*   BANDS PCT % 1  --    SEGS PCT %  --  70   LYMPHO PCT % 27 13*   MONO PCT % 8 15*   EOS PCT % 0 0     Results from last 7 days   Lab Units 02/27/25  0524   SODIUM mmol/L 134*   POTASSIUM mmol/L 3.5   CHLORIDE mmol/L 101   CO2 mmol/L 31   BUN mg/dL 10   CREATININE mg/dL 0.98   ANION GAP mmol/L 2*   CALCIUM mg/dL 7.8*   ALBUMIN g/dL 2.7*   TOTAL BILIRUBIN mg/dL 1.34*   ALK PHOS U/L 187*   ALT U/L 17   AST U/L 58*   GLUCOSE RANDOM mg/dL 107     Results from last 7 days   Lab Units 02/25/25  1824   INR  1.48*     Results from last 7 days   Lab Units 02/25/25  1814   POC GLUCOSE mg/dl 105     Results from last 7 days   Lab Units 02/26/25  0522   HEMOGLOBIN A1C % 4.7     Results from last 7 days   Lab Units 02/25/25  1824   LACTIC ACID mmol/L 1.6   PROCALCITONIN ng/ml <0.05       Recent Cultures (last 7 days):   Results from last 7 days   Lab Units 02/25/25  1824   BLOOD CULTURE  No Growth at 24 hrs.   GRAM STAIN RESULT  Gram negative rods*             Last 24 Hours Medication List:     Current Facility-Administered Medications:     acetaminophen (TYLENOL) tablet 650 mg, Q6H PRN    [Held by provider] amitriptyline (ELAVIL) tablet 75 mg, HS    atenolol (TENORMIN) tablet 25 mg, Daily    cefTRIAXone (ROCEPHIN) IVPB (premix in dextrose) 2,000 mg 50 mL, Q24H, Last Rate: 2,000 mg (02/27/25 0844)    dapsone tablet 50 mg, Daily    folic acid (FOLVITE) tablet 1 mg, Daily    lactulose (CHRONULAC) oral solution 30 g, 4x Daily    midodrine (PROAMATINE) tablet 10 mg, TID AC    pantoprazole (PROTONIX) EC tablet 40 mg, Early Morning    potassium chloride (Klor-Con M20) CR tablet 40 mEq, Daily    rifaximin (XIFAXAN) tablet 550 mg, Q12H MIGUEL    [Held by provider] sertraline (ZOLOFT) tablet 100 mg, Daily    spironolactone (ALDACTONE) tablet 25 mg, Daily     torsemide (DEMADEX) tablet 20 mg, BID    ursodiol (ACTIGALL) capsule 300 mg, BID    Administrative Statements   Today, Patient Was Seen By: Mar Oliva MD      **Please Note: This note may have been constructed using a voice recognition system.**

## 2025-02-27 NOTE — ASSESSMENT & PLAN NOTE
Lab Results   Component Value Date    RBC 2.77 (L) 02/27/2025    RBC 2.65 (L) 02/26/2025    RBC 2.73 (L) 02/25/2025    WBC 0.94 (L) 02/27/2025    WBC 0.96 (L) 02/26/2025    WBC 1.76 (L) 02/25/2025    PLT 57 (L) 02/27/2025    PLT 62 (L) 02/26/2025    PLT 68 (L) 02/25/2025   Likely secondary to liver cirrhosis  Will hold off on DVT prophylaxis, continue SCDs

## 2025-02-27 NOTE — PROGRESS NOTES
Pastoral Care Progress Note   responded to pt call bell. Pt upright in bed, no family present.  Pt shared her need for help with the toilet.  sent message to pt's RN, asked pt to remain patient with staff while they finish their morning jobs.  CH remains available.         02/27/25 1000   Clinical Encounter Type   Visited With Patient   Referral From Patient

## 2025-02-27 NOTE — ASSESSMENT & PLAN NOTE
"Met sepsis criteria with temp of 103, tachycardia of 101, tachypnea with of 32, WBC of 1.76  Chest x-ray: \"Interstitial and vascular prominence in the lungs suggesting mild CHF. Correlate with clinical scenario.\"   CT head: \"No acute intracranial abnormality.\"  Procalcitonin normal at < 0.05, lactic acid normal at 1.6  Blood culture 1 of 2 bottles with gram-negative sarahi.  Continue with high-dose IV ceftriaxone  COVID/Flu negative  UA normal   Follow-up with CT chest abdomen and pelvis  Lactic acid normal at 1.6, Procalcitonin normal < 0.05  Fever 103, received Ofirmev 1000 mg IV in the ED  Monitor fever curve, vital signs, and symptoms   "

## 2025-02-27 NOTE — PHYSICAL THERAPY NOTE
PHYSICAL THERAPY EVALUATION  NAME:  Bailey Olsen  DATE: 02/27/25    AGE:   54 y.o.  Mrn:   [id number]  ADMIT DX:  Hepatic encephalopathy (HCC) [K76.82]  Hypomagnesemia [E83.42]  Altered mental status [R41.82]  Anemia [D64.9]  Thrombocytopenia (HCC) [D69.6]  Fever [R50.9]  Neutropenia (HCC) [D70.9]  AMS (altered mental status) [R41.82]    Past Medical History:   Diagnosis Date    Back pain     Carcinoid tumor     neuroendocrine    CHF (congestive heart failure) (HCC)     Cirrhosis of liver (HCC)     Hypertension     Sarcoidosis      Length Of Stay: 2  Performed at least 2 patient identifiers during session: Name and Birthday  PHYSICAL THERAPY EVALUATION :    02/27/25 1339   PT Last Visit   PT Visit Date 02/27/25   Note Type   Note type Evaluation   Pain Assessment   Pain Assessment Tool 0-10   Pain Score 7   Pain Location/Orientation Orientation: Lower;Location: Back   Restrictions/Precautions   Other Precautions Chair Alarm;Bed Alarm;Fall Risk;Pain   Home Living   Type of Home House  (1 FABRICIO)   Home Layout Two level;Performs ADLs on one level;Able to live on main level with bedroom/bathroom   Bathroom Shower/Tub Tub/shower unit   Bathroom Toilet Raised   Bathroom Equipment Grab bars in shower;Grab bars around toilet   Home Equipment Walker;Cane  (rollator-up in attic. uses RW for mobility when feeling bad, when feeling good doesn't use an AD.)   Additional Comments Reports living in a 2SH with 1 FABRICIO and using RW for mobility when not feeling well.   Prior Function   Level of [place] Independent with ADLs;Independent with functional mobility;Independent with IADLS   Lives With Spouse;Daughter   Receives Help From Family   IADLs Independent with meal prep;Independent with medication management;Family/Friend/Other provides transportation  (spouse has been assisting with medication management as pt has ? compliance)   Falls in the last 6 months 1 to 4  (2 falls)   Comments Reports being independent with or without  "RW using RW when feeling bad and no AD when feeling good   General   Additional Pertinent History slight B LE edema. Pt with frequent admissions since September 2024. Admitted 9/2024, 10/2024, 11/2024, 12/2024, 1/2025, and 2 admissions this month. Pt with admissions for hepatic encephalopathy, KISHORE, hyperammonemia, change in mental status, rectal bleeding and anasarca.   Cognition   Orientation Level Oriented X4   Following Commands Follows one step commands without difficulty   Subjective   Subjective \"I have to go to the bathroom.\"   RLE Assessment   RLE Assessment WFL  (4/5)   LLE Assessment   LLE Assessment WFL  (4/5)   Bed Mobility   Supine to Sit 5  Supervision   Additional items Increased time required;Verbal cues   Additional Comments HOB elevated ~ 20 degrees. inc time to complete   Transfers   Sit to Stand 5  Supervision   Additional items Verbal cues   Stand to Sit 5  Supervision   Additional items Verbal cues   Stand pivot 5  Supervision   Additional items Verbal cues   Toilet transfer 5  Supervision   Additional items Standard toilet;Increased time required   Additional Comments RW. cues for hand placement for safety with RW. inc time to complete, cues for turning completely with RW as patient attempted to push RW aside.   Ambulation/Elevation   Gait pattern Wide DENICE;Improper Weight shift;Decreased foot clearance;Short stride;Excessively slow   Gait Assistance 5  Supervision   Additional items Verbal cues   Assistive Device Rolling walker   Distance ambulated 25'x1 with RW with supervision with wide DENICE, slow cary, cues for safe navigation and avoidance of objects as patient pushed RW into chair.   Balance   Static Sitting Normal   Dynamic Sitting Good   Static Standing Fair +   Dynamic Standing Fair   Ambulatory Fair   Activity Tolerance   Activity Tolerance Patient tolerated treatment well   Medical Staff Made Aware OT, Pat   Assessment   Prognosis Good   Problem List Decreased strength;Impaired " "balance;Decreased mobility;Decreased safety awareness;Impaired judgement;Obesity;Pain   Barriers to Discharge Comments FABRICIO home, fall risk, safety cocnerns with consistent RW use. frequent admissions   Goals   Patient Goals \"Go home\"   [facility] Expiration Date 03/13/25   PT Treatment Day 1   Plan   Treatment/Interventions ADL retraining;Functional transfer training;LE strengthening/ROM;Elevations;Therapeutic exercise;Endurance training;Patient/family training;Equipment eval/education;Bed mobility;Gait training;Compensatory technique education;Spoke to nursing;Spoke to case management;OT   PT Frequency 1-2x/wk   Discharge Recommendation   Rehab Resource Intensity Level, PT III (Minimum Resource Intensity)   Equipment Recommended   (walker-pt has)   Additional Comments continued support from spouse prn   AM-PAC Basic Mobility Inpatient   Turning in Flat Bed Without Bedrails 4   Lying on Back to Sitting on Edge of Flat Bed Without Bedrails 3   Moving Bed to Chair 3   Standing Up From Chair Using Arms 3   Walk in Room 3   Climb 3-5 Stairs With Railing 3   Basic Mobility Inpatient Raw Score 19   Basic Mobility Standardized Score 42.48   [facility] Highest Level Of Mobility   -HLM Goal 6: Walk 10 steps or more   -HLM Achieved 8: Walk 250 feet ot more   Additional Treatment Session   Start Time 1350   End Time 1406   Treatment Assessment Pt tolerated session well. She requires cues for safety with RW and navigation with RW. She is limited by decreased strength, balance, safety and back pain. She will continue to beenfit from PT services to maximize LOF.   Equipment Use use of RW. verbal cues for hand placement. standing dynamic balance wiht supervision with wide DENICE. pt completing pericare with supervision with verbal cues/education for proper hygiene to prevent risk of UTI as patient tends to wipe back to front bringin bowel forward on wipe. completed standing dynamic balance with supervision at sink for hand hygiene. pt " atempted to push RW aside to pivot to sink with cues for continued use of RW for balance. ambulated 390'x1 with RW with supervision with slow cary. sit to supine for bed mobility independent.   End of Consult   Patient Position at End of Consult Supine;Bed/Chair alarm activated;All needs within reach         (Please find full objective findings from PT assessment regarding body systems outlined above).     Assessment: Pt is a 54 y.o. female seen for PT evaluation s/p admission to [facility] on 2/25/2025 with Sepsis (HCC).  Order placed for PT services.  Upon evaluation: Pt is presenting with impaired functional mobility due to pain, decreased strength, decreased endurance, impaired balance, gait deviations, decreased safety awareness, fall risk, and LE edema requiring  supervision assistance for bed mobility, supervision assistance for transfers, and supervision assistance for ambulation with RW . Pt's clinical presentation is currently evolving given the functional mobility deficits above, especially weakness, edema of extremities, impaired balance, gait deviations, pain, decreased safety awareness, and impaired judgement, coupled with fall risks as indicated by AM-PAC 6-Clicks: 19/24 as well as hx of falls, impaired balance, polypharmacy, impaired judgement, decreased safety awareness, and obesity and combined with medical complications of pain impacting overall mobility status, abnormal H&H, abnormal sodium values, multiple readmissions, need for input for mobility technique/safety, and abnormal blood gas, sepsis, acute metabolic encephalopathy on admission, hyperammonemia, hypomagnesemia, acute respiratory failure on admission, gram-negative bacteremia, carcinoid tumor, CHF, GI bleed, depression, anxiety, chronic pain .  Pt's PMHx and comorbidities that may affect physical performance and progress include: Fibromyalgia, HTN, obesity, and liver cirrhosis due to HYATT, ANGELICA, pancytopenia,  sarcoidosis . Personal factors affecting pt at time of IE include: questionable non-compliance, step(s) to enter environment, inability to perform IADLs, inability to perform ADLs, inability to navigate level surfaces without external assistance, and recent fall(s)/fall history. Pt will benefit from continued skilled PT services to address deficits as defined above and to maximize level of functional mobility to facilitate return toward PLOF and improved QOL. From PT/mobility standpoint, recommendation at time of d/c would be Level III (Minimum Resource Intensity), with family and/or caregiver support, and with walker in order to reduce fall risk and maximize pt's functional independence and consistency with mobility. Recommend trial with walker next 1-2 sessions and ther ex next 1-2 sessions.       The patient's AM-PAC Basic Mobility Inpatient Short Form Raw Score is 19. A Raw score of greater than 16 suggests the patient may benefit from discharge to home. Please also refer to the recommendation of the Physical Therapist for safe discharge planning.       Goals: Pt will: Perform bed mobility tasks with independent to reposition in bed and prepare for transfers. Pt will perform transfers with modified Independent to decrease burden of care, decrease risk for falls, and improve activity tolerance and prepare for ambulation. Pt will ambulate with LRAD for >/= 500' with  modified Independent  to decrease burden of care, decrease risk for falls, improve activity tolerance, and improve gait quality and to access home environment. Pt will complete 1 step with LRAD and >/= 12 steps with unilateral handrail with modified Independent to decrease burden of care, return to home with FABRICIO, decrease risk for falls, and improve activity tolerance. Pt will participate in objective balance assessment to determine baseline fall risk. Pt will participate in SSWS assessment to determine level of mobility. Pt will increase B LE  strength >/= 1/2 MMT grade to facilitate functional mobility.      Dianna Lawler, PT,DPT

## 2025-02-27 NOTE — ASSESSMENT & PLAN NOTE
Lab Results   Component Value Date    AMMONIA 25 02/27/2025    AMMONIA 60 02/26/2025    AMMONIA 86 (H) 02/25/2025    AMMONIA 77 (H) 02/12/2025   Ammonia noted to be 86 on admission  Pt reports taking all home medications  Received lactulose 30 g in the ED, continue TID with goal of 3 BMs  Monitor ammonia  Appreciate GI recommendations

## 2025-02-27 NOTE — ASSESSMENT & PLAN NOTE
As above  Awaiting culture  Possible colonoscopy in the future to r/o a source of bacteremia depending on the species

## 2025-02-27 NOTE — ASSESSMENT & PLAN NOTE
"Noted hx of HYATT cirrhosis  EGD from 1/2025 no evidence of varices  Home medications: lactulose, rifaximin, carvedilol, and torsemide  Give lasix 40 mg   Continue home medications   Lipase normal at 11, CK normal at 43  Daily weights   ECHO 10/2024: \"Left ventricular cavity size is normal. There is mild concentric hypertrophy. The left ventricular ejection fraction is 60%. Systolic function is normal. Wall motion is normal. Diastolic function is normal. The left atrium is mildly dilated. There is mild mitral valve regurgitation. There is mild tricuspid valve regurgitation. The right ventricular systolic pressure is normal. There is no pericardial effusion.\"  MELD-Na-15  Continue with diuretics, continue with lactulose and titrate to 3-4 soft BMs daily.  Had last variceal screening on 1/18/2025 with no esophageal varices.  Appreciate GI recommendations  "

## 2025-02-27 NOTE — ASSESSMENT & PLAN NOTE
Noted hx   Hold home dapsone in view of pancytopenia  Monitor counts closely as patient does have baseline pancytopenia

## 2025-02-27 NOTE — ASSESSMENT & PLAN NOTE
Hgb stable, WBC significantly low at 0.96, plts 68, MCV wnl  Folate 22.3, B12 wnl  Transfuse as needed and continue to monitor hgb/WBC/plts  Follows with [facility] heme/onc for her hx of SB/duodenal carcinoid tumor and prior debulking procedure.

## 2025-02-27 NOTE — PROGRESS NOTES
Progress Note - Gastroenterology   Name: Bailey Olsen 54 y.o. female I MRN: [id number]  Unit/Bed#: -01 I Date of Admission: 2/25/2025   Date of Service: 2/27/2025 I Hospital Day: 2     Assessment & Plan  Liver cirrhosis secondary to HYATT (nonalcoholic steatohepatitis) (HCC)  MELD-Na 15, Child Wilder class 8B  Ascites  Diuretics on lasix and aldactone, continue regimen  Low Na diet, 2g/day  Hepatic encephalopathy  Pt presents encephalopathic without clear cause. Possibly related to constipation and opiates for pain as she is not having multiple BMs daily. Improving with medications at this time  No significant ascites for paracentesis  No EV noted on recent EGD 1/18/25  No noted change in meds- unclear complaince  On chronic opiates  Has BID pantoprazole on her med list- unclear if she is taking this. No bleeding lesions noted on recent EGD so this could be stopped if HE is not improving  Gram neg sarahi bactermia (suspect this is the cause of her HE)  Avoid opiates as able and continue xifaxan and lactulose  Titrate lactulose to 3-4 loose to soft BMs daily  Esophageal varices  EGD 1/18/25 no EV  HCC screening  CT A/P no mass 12/2/24    Acute metabolic encephalopathy  As above  Awaiting culture  Possible colonoscopy in the future to r/o a source of bacteremia depending on the species  Hyperammonemia (HCC)  As above  Pancytopenia (HCC)  Hgb stable, WBC significantly low at 0.96, plts 68, MCV wnl  Folate 22.3, B12 wnl  Transfuse as needed and continue to monitor hgb/WBC/plts  [facility] her hx of SB/duodenal carcinoid tumor and prior debulking procedure.     HPI: Bailey Olsen is a 54 y.o. 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 Past surgical hx also notable for total gastrectomy and  duodenectomy with EJ anastomosis.     She feels remarkably better today with much better mentation. No confusion, sleep disturbances, or other GI symptoms reported. Abdominal pain improved.     Reviewed workup- gram negative sarahi bacteremia, US unremarkable other than trace ascites.     Discussed with pt and her sister Fidelina who was available by speaker phone    Endoscopic risk assessment:  Anticoagulation use:n  Diabetes medication:n  CVS history:CHF  Abdominal surgeries:CCY, RYGB, hernia repair, pancreas debulking procedure  Sleep apnea:n  O2 use:n      Medications Prior to Admission:     amitriptyline (ELAVIL) 75 mg tablet    atenolol (TENORMIN) 25 mg tablet    cyclobenzaprine (FLEXERIL) 10 mg tablet    dapsone 25 mg tablet    folic acid (FOLVITE) 1 mg tablet    lactulose (CHRONULAC) 10 g/15 mL solution    lidocaine (LIDODERM) 5 %    midodrine (PROAMATINE) 2.5 mg tablet    nystatin (MYCOSTATIN) powder    oxyCODONE (ROXICODONE) 10 MG TABS    pantoprazole (PROTONIX) 40 mg tablet    polyethylene glycol (MIRALAX) 17 g packet    potassium chloride (Klor-Con M20) 20 mEq tablet    rifaximin (XIFAXAN) 550 mg tablet    sertraline (ZOLOFT) 100 mg tablet    spironolactone (ALDACTONE) 25 mg tablet    torsemide (DEMADEX) 20 mg tablet    ursodiol (ACTIGALL) 300 mg capsule    Current Facility-Administered Medications:     acetaminophen (TYLENOL) tablet 650 mg, Q6H PRN    [Held by provider] amitriptyline (ELAVIL) tablet 75 mg, HS    atenolol (TENORMIN) tablet 25 mg, Daily    cefTRIAXone (ROCEPHIN) IVPB (premix in dextrose) 2,000 mg 50 mL, Q24H, Last Rate: 2,000 mg (02/27/25 0844)    dapsone tablet 50 mg, Daily    folic acid (FOLVITE) tablet 1 mg, Daily    lactulose (CHRONULAC) oral solution 30 g, 4x Daily    midodrine (PROAMATINE) tablet 10 mg, TID AC    pantoprazole (PROTONIX) EC tablet 40 mg, Early Morning    potassium chloride (Klor-Con M20) CR tablet 40 mEq, Daily    rifaximin (XIFAXAN) tablet 550 mg, Q12H MIGUEL    [Held by  provider] sertraline (ZOLOFT) tablet 100 mg, Daily    spironolactone (ALDACTONE) tablet 25 mg, Daily    torsemide (DEMADEX) tablet 20 mg, BID    ursodiol (ACTIGALL) capsule 300 mg, BID  Allergies   Allergen Reactions    Zolpidem GI Intolerance, Other (See Comments) and Hives     Other Reaction(s): Other      Blacks out    Blacks out    Tapentadol Sneezing and Other (See Comments)     Zoned out    Alprazolam Other (See Comments)     Other Reaction(s): Other      Feels zoned out    Feels zoned out    Amoxicillin-Pot Clavulanate Hives and Nausea Only     Amoxil fine    Dust Mite Extract Sneezing    Morphine Headache, Nausea Only and Vomiting    Tramadol GI Intolerance    Azelastine Rash    Azelastine Hcl Rash    Molds & Smuts Rash     Other Reaction(s): Unknown       Physical Exam  Constitutional:       General: She is not in acute distress.     Appearance: She is not ill-appearing.   HENT:      Head: Normocephalic and atraumatic.   Eyes:      Conjunctiva/sclera: Conjunctivae normal.   Pulmonary:      Effort: Pulmonary effort is normal.   Abdominal:      General: Abdomen is flat. There is no distension.      Palpations: Abdomen is soft.      Tenderness: There is no abdominal tenderness.   Skin:     General: Skin is warm and dry.      Coloration: Skin is not jaundiced.   Neurological:      Mental Status: She is alert and oriented to person, place, and time.   Psychiatric:         Mood and Affect: Mood normal.         Behavior: Behavior normal.       Most Recent Vital Signs:  Vitals:    02/27/25 0529 02/27/25 0752 02/27/25 0837 02/27/25 0837   BP: 126/74 109/69 128/63 128/63   BP Location: Right arm      Pulse: 82 86 83 83   Resp: 18 20     Temp:  97.9 °F (36.6 °C)     TempSrc:       SpO2: 92% 93%  96%   Weight:           Intake/Output Summary (Last 24 hours) at 2/27/2025 1111  Last data filed at 2/27/2025 0842  Gross per 24 hour   Intake 480 ml   Output 980 ml   Net -500 ml       LABS/IMAGING  Lab Results: I have  reviewed all relevant lab results during this hospitalization.    Imaging Studies:  I have reviewed all the relevant images during this hospitalizations    Counseling / Coordination of Care  Total time spent today 30 minutes. Greater than 50% of total time was spent with the patient and / or family counseling and / or coordination of care.    Rad Manzano PA-C

## 2025-02-28 LAB
ALBUMIN SERPL BCG-MCNC: 2.7 G/DL (ref 3.5–5)
ALP SERPL-CCNC: 206 U/L (ref 34–104)
ALT SERPL W P-5'-P-CCNC: 16 U/L (ref 7–52)
ANION GAP SERPL CALCULATED.3IONS-SCNC: 2 MMOL/L (ref 4–13)
AST SERPL W P-5'-P-CCNC: 51 U/L (ref 13–39)
BILIRUB SERPL-MCNC: 1 MG/DL (ref 0.2–1)
BUN SERPL-MCNC: 16 MG/DL (ref 5–25)
CALCIUM ALBUM COR SERPL-MCNC: 8.8 MG/DL (ref 8.3–10.1)
CALCIUM SERPL-MCNC: 7.8 MG/DL (ref 8.4–10.2)
CHLORIDE SERPL-SCNC: 102 MMOL/L (ref 96–108)
CO2 SERPL-SCNC: 33 MMOL/L (ref 21–32)
CREAT SERPL-MCNC: 0.91 MG/DL (ref 0.6–1.3)
ERYTHROCYTE [DISTWIDTH] IN BLOOD BY AUTOMATED COUNT: 18.4 % (ref 11.6–15.1)
GFR SERPL CREATININE-BSD FRML MDRD: 71 ML/MIN/1.73SQ M
GLUCOSE SERPL-MCNC: 94 MG/DL (ref 65–140)
HCT VFR BLD AUTO: 28.3 % (ref 34.8–46.1)
HGB BLD-MCNC: 8.6 G/DL (ref 11.5–15.4)
MCH RBC QN AUTO: 29.6 PG (ref 26.8–34.3)
MCHC RBC AUTO-ENTMCNC: 30.4 G/DL (ref 31.4–37.4)
MCV RBC AUTO: 97 FL (ref 82–98)
NRBC BLD AUTO-RTO: 0 /100 WBCS
PLATELET # BLD AUTO: 57 THOUSANDS/UL (ref 149–390)
PMV BLD AUTO: 11.8 FL (ref 8.9–12.7)
POTASSIUM SERPL-SCNC: 3.5 MMOL/L (ref 3.5–5.3)
PROCALCITONIN SERPL-MCNC: 0.17 NG/ML
PROT SERPL-MCNC: 6.8 G/DL (ref 6.4–8.4)
RBC # BLD AUTO: 2.91 MILLION/UL (ref 3.81–5.12)
SODIUM SERPL-SCNC: 137 MMOL/L (ref 135–147)
WBC # BLD AUTO: 0.98 THOUSAND/UL (ref 4.31–10.16)

## 2025-02-28 PROCEDURE — 84145 PROCALCITONIN (PCT): CPT | Performed by: INTERNAL MEDICINE

## 2025-02-28 PROCEDURE — 99232 SBSQ HOSP IP/OBS MODERATE 35: CPT | Performed by: INTERNAL MEDICINE

## 2025-02-28 PROCEDURE — 85027 COMPLETE CBC AUTOMATED: CPT | Performed by: INTERNAL MEDICINE

## 2025-02-28 PROCEDURE — 80053 COMPREHEN METABOLIC PANEL: CPT | Performed by: INTERNAL MEDICINE

## 2025-02-28 RX ORDER — OXYCODONE HYDROCHLORIDE 5 MG/1
5 TABLET ORAL EVERY 6 HOURS PRN
Refills: 0 | Status: DISCONTINUED | OUTPATIENT
Start: 2025-02-28 | End: 2025-03-01 | Stop reason: HOSPADM

## 2025-02-28 RX ADMIN — SERTRALINE 100 MG: 100 TABLET, FILM COATED ORAL at 08:24

## 2025-02-28 RX ADMIN — PANTOPRAZOLE SODIUM 40 MG: 40 TABLET, DELAYED RELEASE ORAL at 04:57

## 2025-02-28 RX ADMIN — URSODIOL 300 MG: 300 CAPSULE ORAL at 08:24

## 2025-02-28 RX ADMIN — TORSEMIDE 20 MG: 20 TABLET ORAL at 17:37

## 2025-02-28 RX ADMIN — MIDODRINE HYDROCHLORIDE 10 MG: 5 TABLET ORAL at 08:24

## 2025-02-28 RX ADMIN — SPIRONOLACTONE 25 MG: 25 TABLET, FILM COATED ORAL at 08:24

## 2025-02-28 RX ADMIN — LACTULOSE 30 G: 20 SOLUTION ORAL at 08:23

## 2025-02-28 RX ADMIN — RIFAXIMIN 550 MG: 550 TABLET ORAL at 21:24

## 2025-02-28 RX ADMIN — LACTULOSE 30 G: 20 SOLUTION ORAL at 12:42

## 2025-02-28 RX ADMIN — LACTULOSE 30 G: 20 SOLUTION ORAL at 17:37

## 2025-02-28 RX ADMIN — ATENOLOL 25 MG: 25 TABLET ORAL at 08:24

## 2025-02-28 RX ADMIN — RIFAXIMIN 550 MG: 550 TABLET ORAL at 08:24

## 2025-02-28 RX ADMIN — MIDODRINE HYDROCHLORIDE 10 MG: 5 TABLET ORAL at 17:00

## 2025-02-28 RX ADMIN — FOLIC ACID 1 MG: 1 TABLET ORAL at 08:24

## 2025-02-28 RX ADMIN — CEFTRIAXONE 2000 MG: 2 INJECTION, SOLUTION INTRAVENOUS at 08:24

## 2025-02-28 RX ADMIN — POTASSIUM CHLORIDE 40 MEQ: 1500 TABLET, EXTENDED RELEASE ORAL at 08:24

## 2025-02-28 RX ADMIN — MIDODRINE HYDROCHLORIDE 10 MG: 5 TABLET ORAL at 12:42

## 2025-02-28 RX ADMIN — TORSEMIDE 20 MG: 20 TABLET ORAL at 08:24

## 2025-02-28 RX ADMIN — URSODIOL 300 MG: 300 CAPSULE ORAL at 17:37

## 2025-02-28 RX ADMIN — OXYCODONE HYDROCHLORIDE 5 MG: 5 TABLET ORAL at 21:24

## 2025-02-28 RX ADMIN — LACTULOSE 30 G: 20 SOLUTION ORAL at 21:27

## 2025-02-28 RX ADMIN — OXYCODONE HYDROCHLORIDE 5 MG: 5 TABLET ORAL at 13:30

## 2025-02-28 NOTE — CASE MANAGEMENT
Case Management Discharge Planning Note    Patient name Bailey Olsen  Location /-01 MRN [id number]  : 1970 Date 2025       Current Admission Date: 2025  Current Admission Diagnosis:Sepsis (HCC)   Patient Active Problem List    Diagnosis Date Noted Date Diagnosed    Gram-negative bacteremia 2025     Acute respiratory failure (HCC) 2025     Carcinoid tumor of pancreas 2025     Sepsis (HCC) 2025     Hypomagnesemia 2025     Upper GI bleed 2025     Acute anemia 2025     Rectal bleeding 2025     Acute blood loss anemia 2025     Chronic, continuous use of opioids 2025     Acute metabolic encephalopathy 2025     SIRS (systemic inflammatory response syndrome) ([facility]) 2025     Sacral fracture, closed ([facility]) 11/15/2024     KISHORE (acute kidney injury) ([facility]) 10/04/2024     Morbid obesity with BMI of 40.0-44.9, adult ([facility]) 10/04/2024     Pathological fracture of vertebra due to secondary osteoporosis ([facility]) 2024     Fall from standing 2024     Closed compression fracture of L1 lumbar vertebra, sequela 2024     Closed stable burst fracture of first lumbar vertebra with routine healing 2024     Groin hematoma 2024     Ambulatory dysfunction 2024     Intractable back pain 2024     Hyperammonemia ([facility]) 2024     Hepatic encephalopathy (HCC) 2024     Fall 2024     Liver cirrhosis secondary to HYATT (nonalcoholic steatohepatitis) ([facility]) 2024     History of malignant carcinoid tumor of small intestine 2024     Class 3 severe obesity due to excess calories in adult ([facility]) 2024     Primary hypertension 2024     Hypokalemia 2024     Fibromyalgia 2024     Iron deficiency anemia 2024     Pancytopenia (HCC) 2024     Sarcoidosis 2024     Bilateral pulmonary infiltrates 2024       LOS (days): 3  Geometric Mean LOS (GMLOS) (days):    [place] [facility]     OBJECTIVE:  Risk of Unplanned Readmission Score: 70.79         Current admission status: Inpatient   Preferred Pharmacy:   [id number] - [place] - [address]  [address] [facility] [address]  Phone: [phone] Fax: [phone]    [id number] [place] [place] [facility] [address]  [address] [address]  Phone: [phone] Fax: [phone]    Primary Care Provider: Nayely Brown DO    Primary Insurance: MEDICARE  Secondary Insurance: BLUE CROSS    DISCHARGE DETAILS:    Discharge planning discussed with:: daughter           Were Treatment Team discharge recommendations reviewed with patient/caregiver?: Yes  Did patient/caregiver verbalize understanding of patient care needs?: Yes            Requested Home Health Care         Is the patient interested in HHC at discharge?: Yes  Home Health Discipline requested:: Nursing, Occupational Therapy, Physical Therapy  Home Health Agency Name:: [facility]  HHA External Referral Reason (only applicable if external HHA name selected): Patient has established relationship with provider  Home Health Follow-Up Provider:: PCP  Home Health Services Needed:: Evaluate Functional Status and Safety, Gait/ADL Training, Strengthening/Theraputic Exercises to Improve Function, Other (comment) (Medication Management)  Homebound Criteria Met:: Uses an Assist Device (i.e. cane, walker, etc)  Supporting Clincal Findings:: Limited Endurance    DME Referral Provided  Referral made for DME?: No    Other Referral/Resources/Interventions Provided:  Interventions: HHC  Referral Comments: [facility]         Treatment Team Recommendation: Home with Home Health Care  Discharge Destination Plan:: Home with Home Health Care  Transport at Discharge : Family                  CM met with patient to review discharge planning. CM informed patient therapy recommended HHC resumption, patient happy with  [facility] for PT, OT and VN.  [facility] secured in [place].    CM discussed transport of patient when medically stable for discharge and patient indicated her spouse has been sick so she is not sure if she will have ride home or not.  CM to follow up with patient.      CM offered patient Medicare Appeal rights and patient declined having Medicare.   CM message Hospital Verification to ask for insurance verification on patient.     CM to follow for any additional CM discharge referral needs.     1340 CM Made aware from Hospital Insurance Verification patient has Blue cross primary medicare A secondary.

## 2025-02-28 NOTE — ASSESSMENT & PLAN NOTE
"Pt presents with altered mental status  Pt reports taking all home medication  On exam, pt with slow responses but alert & orientation x 4   Chest x-ray: \"Interstitial and vascular prominence in the lungs suggesting mild CHF. Correlate with clinical scenario.\"   CT head: \"No acute intracranial abnormality.\"  Ammonia elevated at 86, trend  Likely etiology of encephalopathy is hepatic encephalopathy in the setting of gram-negative bacteremia, ongoing use of oxycodone.  Improving with current administration of lactulose and IV antibiotics.  Ethanol normal < 10  Urine drug screen normal   Appreciate GI recommendations  "

## 2025-02-28 NOTE — PROGRESS NOTES
"Progress Note - Hospitalist   Name: Bailey Olsen 54 y.o. female I MRN: [id number]  Unit/Bed#: -Sarah I Date of Admission: 2/25/2025   Date of Service: 2/28/2025 I Hospital Day: 3    Assessment & Plan  Sepsis (HCC)  Met sepsis criteria with temp of 103, tachycardia of 101, tachypnea with of 32, WBC of 1.76  Chest x-ray: \"Interstitial and vascular prominence in the lungs suggesting mild CHF. Correlate with clinical scenario.\"   CT head: \"No acute intracranial abnormality.\"  Procalcitonin normal at < 0.05, lactic acid normal at 1.6  Blood culture 1 of 2 bottles with gram-negative sarahi.  Continue with high-dose IV ceftriaxone  COVID/Flu negative  UA normal   Follow-up with CT chest abdomen and pelvis  Lactic acid normal at 1.6, Procalcitonin normal < 0.05-->0.17  CT chest abdomen and pelvis concerning for aspiration pneumonia.  Patient does admit to mild cough.  Fever 103, received Ofirmev 1000 mg IV in the ED  Monitor fever curve, vital signs, and symptoms   Liver cirrhosis secondary to HYATT (nonalcoholic steatohepatitis) (HCC)  Noted hx of HYATT cirrhosis  EGD from 1/2025 no evidence of varices  Home medications: lactulose, rifaximin, carvedilol, and torsemide  Give lasix 40 mg   Continue home medications   Lipase normal at 11, CK normal at 43  Daily weights   ECHO 10/2024: \"Left ventricular cavity size is normal. There is mild concentric hypertrophy. The left ventricular ejection fraction is 60%. Systolic function is normal. Wall motion is normal. Diastolic function is normal. The left atrium is mildly dilated. There is mild mitral valve regurgitation. There is mild tricuspid valve regurgitation. The right ventricular systolic pressure is normal. There is no pericardial effusion.\"  MELD-Na-15  Continue with diuretics, continue with lactulose and titrate to 3-4 soft BMs daily.  Had last variceal screening on 1/18/2025 with no esophageal varices.  Appreciate GI recommendations  Acute metabolic encephalopathy  Pt " "presents with altered mental status  Pt reports taking all home medication  On exam, pt with slow responses but alert & orientation x 4   Chest x-ray: \"Interstitial and vascular prominence in the lungs suggesting mild CHF. Correlate with clinical scenario.\"   CT head: \"No acute intracranial abnormality.\"  Ammonia elevated at 86, trend  Likely etiology of encephalopathy is hepatic encephalopathy in the setting of gram-negative bacteremia, ongoing use of oxycodone.  Improving with current administration of lactulose and IV antibiotics.  Ethanol normal < 10  Urine drug screen normal   Appreciate GI recommendations  Hyperammonemia (HCC)  Lab Results   Component Value Date    AMMONIA 25 02/27/2025    AMMONIA 60 02/26/2025    AMMONIA 86 (H) 02/25/2025    AMMONIA 77 (H) 02/12/2025   Ammonia noted to be 86 on admission  Pt reports taking all home medications  Received lactulose 30 g in the ED, continue TID with goal of 3 BMs  Monitor ammonia  Appreciate GI recommendations  Hypomagnesemia  Lab Results   Component Value Date    MG 2.2 02/26/2025    MG 1.5 (L) 02/25/2025    MG 1.7 (L) 02/13/2025   Magnesium noted to be 1.5 on admission  Give mag sulfate 2 g IV x 2  Monitor magnesium  Class 3 severe obesity due to excess calories in adult ([facility])  Patient's BMI noted to be 47.07 on admission; during previous hospitalization from 2/3-2/13 BMI noted to be 45.31  Recommend lifestyle changes with increased green leafy vegetables and fruits with decreased red meat and processed foods  Recommend moderate to vigorous intensity exercise for 30 minutes at least 5 days a week or ~150min/week  Primary hypertension  Noted hx of HTN  Home medications atenolol, midodrine, spironolactone, torsemide  Continue home medications  Blood pressure noted to be 126/61 on admission, current blood pressure is 128/59  Monitor blood pressure  Fibromyalgia  Noted hx of chronic, diffuse pain.   Continue home regimen   Iron deficiency anemia  Lab Results "   Component Value Date    HGB 8.6 (L) 02/28/2025    HGB 8.4 (L) 02/27/2025    HGB 8.1 (L) 02/26/2025    HCT 28.3 (L) 02/28/2025    HCT 26.8 (L) 02/27/2025    HCT 25.9 (L) 02/26/2025    Noted hx   Iron panel pending   Plan to transfuse PRBC if hgb < 7  Trend CBC  Pancytopenia (HCC)  Lab Results   Component Value Date    RBC 2.91 (L) 02/28/2025    RBC 2.77 (L) 02/27/2025    RBC 2.65 (L) 02/26/2025    WBC 0.98 (L) 02/28/2025    WBC 0.94 (L) 02/27/2025    WBC 0.96 (L) 02/26/2025    PLT 57 (L) 02/28/2025    PLT 57 (L) 02/27/2025    PLT 62 (L) 02/26/2025   Likely secondary to liver cirrhosis  Will hold off on DVT prophylaxis, continue SCDs  Sarcoidosis  Noted hx   Hold home dapsone in view of pancytopenia  Monitor counts closely as patient does have baseline pancytopenia  Acute respiratory failure (HCC)  Pt currently on 2 L NC at 95%  Pt denies any baseline home oxygen needs  VBG pending   Wean oxygen as able  Gram-negative bacteremia  Presented with fever and leukopenia  Admission blood culture 1 of 2 bottles with gram-negative rods  Likely in the setting of GI translocation with hepatic encephalopathy  Follow-up on CT scan of the chest abdomen and pelvis  Urine admission unremarkable for urine tract infection  Continue with high-dose IV ceftriaxone  Further titration of antibiotics per culture and sensitivity results  May need colonoscopy in the near future to rule out source of bacteremia    VTE Pharmacologic Prophylaxis: VTE Score: 4 High Risk (Score >/= 5) - Pharmacological DVT Prophylaxis Contraindicated. Sequential Compression Devices Ordered.    Mobility:   Basic Mobility Inpatient Raw Score: 19  JH-HLM Goal: 6: Walk 10 steps or more  JH-HLM Achieved: 6: Walk 10 steps or more  JH-HLM Goal achieved. Continue to encourage appropriate mobility.    Patient Centered Rounds: I performed bedside rounds with nursing staff today.   Discussions with Specialists or Other Care Team Provider: Discussed with  nursing    Education and Discussions with Family / Patient: Attempted to update  () via phone. Left voicemail.     Current Length of Stay: 3 day(s)  Current Patient Status: Inpatient   Certification Statement: The patient will continue to require additional inpatient hospital stay due to monitoring of blood culture results  Discharge Plan: Anticipate discharge tomorrow to home with home services.    Code Status: Level 1 - Full Code    Subjective   Patient seen and examined at bedside.  Complains of mild cough.  Mentation back to baseline.  No acute events overnight.  Remains afebrile and stable hemodynamically.    Objective :  HR:  [72-79] 75  BP: (102-118)/(55-67) 108/60  Resp:  [18] 18  SpO2:  [94 %-99 %] 99 %  O2 Device: None (Room air)    Body mass index is 42.26 kg/m².     Input and Output Summary (last 24 hours):     Intake/Output Summary (Last 24 hours) at 2/28/2025 1255  Last data filed at 2/28/2025 1050  Gross per 24 hour   Intake 1680 ml   Output 1343 ml   Net 337 ml       Physical Exam  Constitutional:       Appearance: She is ill-appearing.   HENT:      Head: Normocephalic.      Nose: Nose normal.   Eyes:      Extraocular Movements: Extraocular movements intact.      Pupils: Pupils are equal, round, and reactive to light.   Cardiovascular:      Rate and Rhythm: Normal rate.   Pulmonary:      Effort: Pulmonary effort is normal.      Breath sounds: Normal breath sounds.      Comments: Decreased breath sounds left lung base.  Abdominal:      General: Bowel sounds are normal. There is no distension.      Palpations: Abdomen is soft.      Tenderness: There is no abdominal tenderness.   Neurological:      General: No focal deficit present.      Mental Status: She is alert and oriented to person, place, and time. Mental status is at baseline.           Lines/Drains:              Lab Results: I have reviewed the following results:   Results from last 7 days   Lab Units 02/28/25  6718  02/27/25  0524 02/26/25  0522   WBC Thousand/uL 0.98* 0.94* 0.96*   HEMOGLOBIN g/dL 8.6* 8.4* 8.1*   HEMATOCRIT % 28.3* 26.8* 25.9*   PLATELETS Thousands/uL 57* 57* 62*   BANDS PCT %  --  1  --    SEGS PCT %  --   --  70   LYMPHO PCT %  --  27 13*   MONO PCT %  --  8 15*   EOS PCT %  --  0 0     Results from last 7 days   Lab Units 02/28/25  0527   SODIUM mmol/L 137   POTASSIUM mmol/L 3.5   CHLORIDE mmol/L 102   CO2 mmol/L 33*   BUN mg/dL 16   CREATININE mg/dL 0.91   ANION GAP mmol/L 2*   CALCIUM mg/dL 7.8*   ALBUMIN g/dL 2.7*   TOTAL BILIRUBIN mg/dL 1.00   ALK PHOS U/L 206*   ALT U/L 16   AST U/L 51*   GLUCOSE RANDOM mg/dL 94     Results from last 7 days   Lab Units 02/25/25  1824   INR  1.48*     Results from last 7 days   Lab Units 02/25/25  1814   POC GLUCOSE mg/dl 105     Results from last 7 days   Lab Units 02/26/25  0522   HEMOGLOBIN A1C % 4.7     Results from last 7 days   Lab Units 02/28/25  0527 02/25/25  1824   LACTIC ACID mmol/L  --  1.6   PROCALCITONIN ng/ml 0.17 <0.05       Recent Cultures (last 7 days):   Results from last 7 days   Lab Units 02/25/25  1824   BLOOD CULTURE  No Growth at 48 hrs.   GRAM STAIN RESULT  Gram negative rods*             Last 24 Hours Medication List:     Current Facility-Administered Medications:     acetaminophen (TYLENOL) tablet 650 mg, Q6H PRN    [Held by provider] amitriptyline (ELAVIL) tablet 50 mg, HS    atenolol (TENORMIN) tablet 25 mg, Daily    cefTRIAXone (ROCEPHIN) IVPB (premix in dextrose) 2,000 mg 50 mL, Q24H, Last Rate: 2,000 mg (02/28/25 0824)    [Held by provider] dapsone tablet 50 mg, Daily    folic acid (FOLVITE) tablet 1 mg, Daily    lactulose (CHRONULAC) oral solution 30 g, 4x Daily    midodrine (PROAMATINE) tablet 10 mg, TID AC    oxyCODONE (ROXICODONE) IR tablet 5 mg, Q6H PRN    pantoprazole (PROTONIX) EC tablet 40 mg, Early Morning    potassium chloride (Klor-Con M20) CR tablet 40 mEq, Daily    rifaximin (XIFAXAN) tablet 550 mg, Q12H MIGUEL    sertraline  (ZOLOFT) tablet 100 mg, Daily    spironolactone (ALDACTONE) tablet 25 mg, Daily    torsemide (DEMADEX) tablet 20 mg, BID    ursodiol (ACTIGALL) capsule 300 mg, BID    Administrative Statements   Today, Patient Was Seen By: Mar Oliva MD      **Please Note: This note may have been constructed using a voice recognition system.**

## 2025-02-28 NOTE — PROGRESS NOTES
Progress Note - Gastroenterology   Name: Bailey Olsen 54 y.o. female I MRN: [id number]  Unit/Bed#: -01 I Date of Admission: 2/25/2025   Date of Service: 2/28/2025 I Hospital Day: 3     Assessment & Plan  Liver cirrhosis secondary to HYATT (nonalcoholic steatohepatitis) (HCC)  MELD-Na 14 (INR 2/25/25), Child Wilder class 8B  Ascites  Diuretics on lasix and aldactone, continue regimen  Low Na diet, 2g/day  Hepatic encephalopathy  Pt presents encephalopathic without clear cause. Possibly related to constipation and opiates for pain as she is not having multiple BMs daily. Improving with medications at this time  No significant ascites for paracentesis  No EV noted on recent EGD 1/18/25  No noted change in meds- unclear complaince  On chronic opiates  Has BID pantoprazole on her med list- unclear if she is taking this. 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     Acute metabolic encephalopathy  As above  Awaiting culture  Possible colonoscopy in the future to r/o a source of bacteremia depending on the species  Hyperammonemia (HCC)  As above  Pancytopenia (HCC)  Hgb stable, WBC significantly low at 0.96, plts 68, MCV wnl  Folate 22.3, B12 wnl  Transfuse as needed and continue to monitor hgb/WBC/plts  Follows with [facility] heme/onc for her hx of SB/duodenal carcinoid tumor and prior debulking procedure.   Gram-negative bacteremia      HPI: Bailey Olsen is a 54 y.o. 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 Past surgical hx also notable for total gastrectomy and duodenectomy with EJ anastomosis.      Feels well, no HE concerns. Awaiting bacterial culture. No acute concerns. Having good BMs since her opiates were held.       Endoscopic risk assessment:  Anticoagulation use:n  Diabetes medication:n  CVS history:CHF  Abdominal surgeries:CCY, RYGB, hernia repair, pancreas debulking procedure  Sleep apnea:n  O2 use:n      Medications Prior to Admission:     amitriptyline (ELAVIL) 75 mg tablet    atenolol (TENORMIN) 25 mg tablet    cyclobenzaprine (FLEXERIL) 10 mg tablet    dapsone 25 mg tablet    folic acid (FOLVITE) 1 mg tablet    lactulose (CHRONULAC) 10 g/15 mL solution    lidocaine (LIDODERM) 5 %    midodrine (PROAMATINE) 2.5 mg tablet    nystatin (MYCOSTATIN) powder    oxyCODONE (ROXICODONE) 10 MG TABS    pantoprazole (PROTONIX) 40 mg tablet    polyethylene glycol (MIRALAX) 17 g packet    potassium chloride (Klor-Con M20) 20 mEq tablet    rifaximin (XIFAXAN) 550 mg tablet    sertraline (ZOLOFT) 100 mg tablet    spironolactone (ALDACTONE) 25 mg tablet    torsemide (DEMADEX) 20 mg tablet    ursodiol (ACTIGALL) 300 mg capsule    Current Facility-Administered Medications:     acetaminophen (TYLENOL) tablet 650 mg, Q6H PRN    [Held by provider] amitriptyline (ELAVIL) tablet 50 mg, HS    atenolol (TENORMIN) tablet 25 mg, Daily    cefTRIAXone (ROCEPHIN) IVPB (premix in dextrose) 2,000 mg 50 mL, Q24H, Last Rate: 2,000 mg (02/28/25 0824)    [Held by provider] dapsone tablet 50 mg, Daily    folic acid (FOLVITE) tablet 1 mg, Daily    lactulose (CHRONULAC) oral solution 30 g, 4x Daily    midodrine (PROAMATINE) tablet 10 mg, TID AC    pantoprazole (PROTONIX) EC tablet 40 mg, Early Morning    potassium chloride (Klor-Con M20) CR tablet 40 mEq, Daily    rifaximin (XIFAXAN) tablet 550 mg, Q12H MIGUEL     sertraline (ZOLOFT) tablet 100 mg, Daily    spironolactone (ALDACTONE) tablet 25 mg, Daily    torsemide (DEMADEX) tablet 20 mg, BID    ursodiol (ACTIGALL) capsule 300 mg, BID  Allergies   Allergen Reactions    Zolpidem GI Intolerance, Other (See Comments) and Hives     Other Reaction(s): Other      Blacks out    Blacks out    Tapentadol Sneezing and Other (See Comments)     Zoned out    Alprazolam Other (See Comments)     Other Reaction(s): Other      Feels zoned out    Feels zoned out    Amoxicillin-Pot Clavulanate Hives and Nausea Only     Amoxil fine    Dust Mite Extract Sneezing    Morphine Headache, Nausea Only and Vomiting    Tramadol GI Intolerance    Azelastine Rash    Azelastine Hcl Rash    Molds & Smuts Rash     Other Reaction(s): Unknown       Physical Exam  Constitutional:       General: She is not in acute distress.     Appearance: She is not ill-appearing.   HENT:      Head: Normocephalic and atraumatic.   Eyes:      Conjunctiva/sclera: Conjunctivae normal.   Pulmonary:      Effort: Pulmonary effort is normal.   Abdominal:      General: Abdomen is flat. There is no distension.      Palpations: Abdomen is soft.      Tenderness: There is no abdominal tenderness.   Skin:     General: Skin is warm and dry.      Coloration: Skin is not jaundiced.   Neurological:      Mental Status: She is alert and oriented to person, place, and time.   Psychiatric:         Mood and Affect: Mood normal.         Behavior: Behavior normal.       Most Recent Vital Signs:  Vitals:    02/27/25 1528 02/27/25 2358 02/28/25 0503 02/28/25 0739   BP: 118/67 102/55  110/60   BP Location:       Pulse: 74 72  79   Resp: 18 18  18   Temp:       TempSrc:       SpO2: 94% 97%  96%   Weight:   115 kg (253 lb 15.5 oz)        Intake/Output Summary (Last 24 hours) at 2/28/2025 1120  Last data filed at 2/28/2025 1050  Gross per 24 hour   Intake 1680 ml   Output 1343 ml   Net 337 ml       LABS/IMAGING  Lab Results: I have reviewed all relevant  lab results during this hospitalization.    Imaging Studies:  I have reviewed all the relevant images during this hospitalizations    Counseling / Coordination of Care  Total time spent today 20 minutes. Greater than 50% of total time was spent with the patient and / or family counseling and / or coordination of care.    Rad Manzano PA-C

## 2025-02-28 NOTE — ASSESSMENT & PLAN NOTE
Hgb stable, WBC significantly low at 0.96, plts 68, MCV wnl  Folate 22.3, B12 wnl  Transfuse as needed and continue to monitor hgb/WBC/plts  Follows with [facility] heme/onc for her hx of SB/duodenal carcinoid tumor and prior debulking procedure.

## 2025-02-28 NOTE — ASSESSMENT & PLAN NOTE
MELD-Na 14 (INR 2/25/25), Child Wilder class 8B  Ascites  Diuretics on lasix and aldactone, continue regimen  Low Na diet, 2g/day  Hepatic encephalopathy  Pt presents encephalopathic without clear cause. Possibly related to constipation and opiates for pain as she is not having multiple BMs daily. Improving with medications at this time  No significant ascites for paracentesis  No EV noted on recent EGD 1/18/25  No noted change in meds- unclear complaince  On chronic opiates  Has BID pantoprazole on her med list- unclear if she is taking this. 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

## 2025-02-28 NOTE — ASSESSMENT & PLAN NOTE
"Met sepsis criteria with temp of 103, tachycardia of 101, tachypnea with of 32, WBC of 1.76  Chest x-ray: \"Interstitial and vascular prominence in the lungs suggesting mild CHF. Correlate with clinical scenario.\"   CT head: \"No acute intracranial abnormality.\"  Procalcitonin normal at < 0.05, lactic acid normal at 1.6  Blood culture 1 of 2 bottles with gram-negative sarahi.  Continue with high-dose IV ceftriaxone  COVID/Flu negative  UA normal   Follow-up with CT chest abdomen and pelvis  Lactic acid normal at 1.6, Procalcitonin normal < 0.05-->0.17  CT chest abdomen and pelvis concerning for aspiration pneumonia.  Patient does admit to mild cough.  Fever 103, received Ofirmev 1000 mg IV in the ED  Monitor fever curve, vital signs, and symptoms   "

## 2025-02-28 NOTE — ASSESSMENT & PLAN NOTE
Lab Results   Component Value Date    HGB 8.6 (L) 02/28/2025    HGB 8.4 (L) 02/27/2025    HGB 8.1 (L) 02/26/2025    HCT 28.3 (L) 02/28/2025    HCT 26.8 (L) 02/27/2025    HCT 25.9 (L) 02/26/2025    Noted hx   Iron panel pending   Plan to transfuse PRBC if hgb < 7  Trend CBC

## 2025-02-28 NOTE — PLAN OF CARE
Problem: RESPIRATORY - ADULT  Goal: Achieves optimal ventilation and oxygenation  Description: INTERVENTIONS:  - Assess for changes in respiratory status  - Assess for changes in mentation and behavior  - Position to facilitate oxygenation and minimize respiratory effort  - Oxygen administered by appropriate delivery if ordered  - Initiate smoking cessation education as indicated  - Encourage broncho-pulmonary hygiene including cough, deep breathe, Incentive Spirometry  - Assess the need for suctioning and aspirate as needed  - Assess and instruct to report SOB or any respiratory difficulty  - Respiratory Therapy support as indicated  Outcome: Progressing     Problem: GASTROINTESTINAL - ADULT  Goal: Minimal or absence of nausea and/or vomiting  Description: INTERVENTIONS:  - Administer IV fluids if ordered to ensure adequate hydration  - Maintain NPO status until nausea and vomiting are resolved  - Nasogastric tube if ordered  - Administer ordered antiemetic medications as needed  - Provide nonpharmacologic comfort measures as appropriate  - Advance diet as tolerated, if ordered  - Consider nutrition services referral to assist patient with adequate nutrition and appropriate food choices  Outcome: Progressing  Goal: Maintains or returns to baseline bowel function  Description: INTERVENTIONS:  - Assess bowel function  - Encourage oral fluids to ensure adequate hydration  - Administer IV fluids if ordered to ensure adequate hydration  - Administer ordered medications as needed  - Encourage mobilization and activity  - Consider nutritional services referral to assist patient with adequate nutrition and appropriate food choices  Outcome: Progressing     Problem: GENITOURINARY - ADULT  Goal: Maintains or returns to baseline urinary function  Description: INTERVENTIONS:  - Assess urinary function  - Encourage oral fluids to ensure adequate hydration if ordered  - Administer IV fluids as ordered to ensure adequate  hydration  - Administer ordered medications as needed  - Offer frequent toileting  - Follow urinary retention protocol if ordered  Outcome: Progressing     Problem: METABOLIC, FLUID AND ELECTROLYTES - ADULT  Goal: Electrolytes maintained within normal limits  Description: INTERVENTIONS:  - Monitor labs and assess patient for signs and symptoms of electrolyte imbalances  - Administer electrolyte replacement as ordered  - Monitor response to electrolyte replacements, including repeat lab results as appropriate  - Instruct patient on fluid and nutrition as appropriate  Outcome: Progressing  Goal: Fluid balance maintained  Description: INTERVENTIONS:  - Monitor labs   - Monitor I/O and WT  - Instruct patient on fluid and nutrition as appropriate  - Assess for signs & symptoms of volume excess or deficit  Outcome: Progressing     Problem: PAIN - ADULT  Goal: Verbalizes/displays adequate comfort level or baseline comfort level  Description: Interventions:  - Encourage patient to monitor pain and request assistance  - Assess pain using appropriate pain scale  - Administer analgesics based on type and severity of pain and evaluate response  - Implement non-pharmacological measures as appropriate and evaluate response  - Consider cultural and social influences on pain and pain management  - Notify physician/advanced practitioner if interventions unsuccessful or patient reports new pain  Outcome: Progressing     Problem: INFECTION - ADULT  Goal: Absence or prevention of progression during hospitalization  Description: INTERVENTIONS:  - Assess and monitor for signs and symptoms of infection  - Monitor lab/diagnostic results  - Monitor all insertion sites, i.e. indwelling lines, tubes, and drains  - Monitor endotracheal if appropriate and nasal secretions for changes in amount and color  - [place] appropriate cooling/warming therapies per order  - Administer medications as ordered  - Instruct and encourage patient and family  to use good hand hygiene technique  - Identify and instruct in appropriate isolation precautions for identified infection/condition  Outcome: Progressing     Problem: SAFETY ADULT  Goal: Patient will remain free of falls  Description: INTERVENTIONS:  - Educate patient/family on patient safety including physical limitations  - Instruct patient to call for assistance with activity   - Consult OT/PT to assist with strengthening/mobility   - Keep Call bell within reach  - Keep bed low and locked with side rails adjusted as appropriate  - Keep care items and personal belongings within reach  - Initiate and maintain comfort rounds  - Make Fall Risk Sign visible to staff  - Offer Toileting every    Hours, in advance of need  - Initiate/Maintain   alarm  - Obtain necessary fall risk management equipment:     - Apply yellow socks and bracelet for high fall risk patients  - Consider moving patient to room near nurses station  Outcome: Progressing  Goal: Maintain or return to baseline ADL function  Description: INTERVENTIONS:  -  Assess patient's ability to carry out ADLs; assess patient's baseline for ADL function and identify physical deficits which impact ability to perform ADLs (bathing, care of mouth/teeth, toileting, grooming, dressing, etc.)  - Assess/evaluate cause of self-care deficits   - Assess range of motion  - Assess patient's mobility; develop plan if impaired  - Assess patient's need for assistive devices and provide as appropriate  - Encourage maximum independence but intervene and supervise when necessary  - Involve family in performance of ADLs  - Assess for home care needs following discharge   - Consider OT consult to assist with ADL evaluation and planning for discharge  - Provide patient education as appropriate  Outcome: Progressing  Goal: Maintains/Returns to pre admission functional level  Description: INTERVENTIONS:  - Perform AM-PAC 6 Click Basic Mobility/ Daily Activity assessment daily.  - Set and  communicate daily mobility goal to care team and patient/family/caregiver.   - Collaborate with rehabilitation services on mobility goals if consulted  - Perform Range of Motion    times a day.  - Reposition patient every    hours.  - Dangle patient    times a day  - Stand patient    times a day  - Ambulate patient    times a day  - Out of bed to chair    times a day   - Out of bed for meals          times a day  - Out of bed for toileting  - Record patient progress and toleration of activity level   Outcome: Progressing     Problem: DISCHARGE PLANNING  Goal: Discharge to home or other facility with appropriate resources  Description: INTERVENTIONS:  - Identify barriers to discharge w/patient and caregiver  - Arrange for needed discharge resources and transportation as appropriate  - Identify discharge learning needs (meds, wound care, etc.)  - Arrange for interpretive services to assist at discharge as needed  - Refer to Case Management Department for coordinating discharge planning if the patient needs post-hospital services based on physician/advanced practitioner order or complex needs related to functional status, cognitive ability, or social support system  Outcome: Progressing     Problem: Knowledge Deficit  Goal: Patient/family/caregiver demonstrates understanding of disease process, treatment plan, medications, and discharge instructions  Description: Complete learning assessment and assess knowledge base.  Interventions:  - Provide teaching at level of understanding  - Provide teaching via preferred learning methods  Outcome: Progressing

## 2025-02-28 NOTE — ASSESSMENT & PLAN NOTE
Lab Results   Component Value Date    RBC 2.91 (L) 02/28/2025    RBC 2.77 (L) 02/27/2025    RBC 2.65 (L) 02/26/2025    WBC 0.98 (L) 02/28/2025    WBC 0.94 (L) 02/27/2025    WBC 0.96 (L) 02/26/2025    PLT 57 (L) 02/28/2025    PLT 57 (L) 02/27/2025    PLT 62 (L) 02/26/2025   Likely secondary to liver cirrhosis  Will hold off on DVT prophylaxis, continue SCDs

## 2025-02-28 NOTE — ASSESSMENT & PLAN NOTE
Presented with fever and leukopenia  Admission blood culture 1 of 2 bottles with gram-negative rods  Likely in the setting of GI translocation with hepatic encephalopathy  Follow-up on CT scan of the chest abdomen and pelvis  Urine admission unremarkable for urine tract infection  Continue with high-dose IV ceftriaxone  Further titration of antibiotics per culture and sensitivity results  May need colonoscopy in the near future to rule out source of bacteremia

## 2025-03-01 VITALS
BODY MASS INDEX: 42 KG/M2 | HEART RATE: 72 BPM | WEIGHT: 252.4 LBS | RESPIRATION RATE: 17 BRPM | OXYGEN SATURATION: 96 % | TEMPERATURE: 97.7 F | SYSTOLIC BLOOD PRESSURE: 119 MMHG | DIASTOLIC BLOOD PRESSURE: 75 MMHG

## 2025-03-01 LAB
ALL TARGETS: NOT DETECTED
BACTERIA BLD CULT: ABNORMAL
GRAM STN SPEC: ABNORMAL

## 2025-03-01 PROCEDURE — 99239 HOSP IP/OBS DSCHRG MGMT >30: CPT | Performed by: INTERNAL MEDICINE

## 2025-03-01 RX ORDER — AMITRIPTYLINE HYDROCHLORIDE 50 MG/1
50 TABLET ORAL
Qty: 30 TABLET | Refills: 0 | Status: SHIPPED | OUTPATIENT
Start: 2025-03-01

## 2025-03-01 RX ORDER — OXYCODONE HYDROCHLORIDE 10 MG/1
5 TABLET ORAL EVERY 6 HOURS PRN
Start: 2025-03-01 | End: 2025-03-11

## 2025-03-01 RX ORDER — CEPHALEXIN 500 MG/1
500 CAPSULE ORAL EVERY 6 HOURS SCHEDULED
Qty: 20 CAPSULE | Refills: 0 | Status: SHIPPED | OUTPATIENT
Start: 2025-03-01 | End: 2025-03-06

## 2025-03-01 RX ADMIN — TORSEMIDE 20 MG: 20 TABLET ORAL at 08:17

## 2025-03-01 RX ADMIN — OXYCODONE HYDROCHLORIDE 5 MG: 5 TABLET ORAL at 05:47

## 2025-03-01 RX ADMIN — CEFTRIAXONE 2000 MG: 2 INJECTION, SOLUTION INTRAVENOUS at 08:20

## 2025-03-01 RX ADMIN — PANTOPRAZOLE SODIUM 40 MG: 40 TABLET, DELAYED RELEASE ORAL at 05:47

## 2025-03-01 RX ADMIN — LACTULOSE 30 G: 20 SOLUTION ORAL at 08:16

## 2025-03-01 RX ADMIN — FOLIC ACID 1 MG: 1 TABLET ORAL at 08:17

## 2025-03-01 RX ADMIN — RIFAXIMIN 550 MG: 550 TABLET ORAL at 08:17

## 2025-03-01 RX ADMIN — POTASSIUM CHLORIDE 40 MEQ: 1500 TABLET, EXTENDED RELEASE ORAL at 08:17

## 2025-03-01 RX ADMIN — SERTRALINE 100 MG: 100 TABLET, FILM COATED ORAL at 08:17

## 2025-03-01 RX ADMIN — URSODIOL 300 MG: 300 CAPSULE ORAL at 08:17

## 2025-03-01 RX ADMIN — ATENOLOL 25 MG: 25 TABLET ORAL at 08:17

## 2025-03-01 RX ADMIN — SPIRONOLACTONE 25 MG: 25 TABLET, FILM COATED ORAL at 08:17

## 2025-03-01 RX ADMIN — LACTULOSE 30 G: 20 SOLUTION ORAL at 11:32

## 2025-03-01 NOTE — PLAN OF CARE
Problem: GENITOURINARY - ADULT  Goal: Maintains or returns to baseline urinary function  Description: INTERVENTIONS:  - Assess urinary function  - Encourage oral fluids to ensure adequate hydration if ordered  - Administer IV fluids as ordered to ensure adequate hydration  - Administer ordered medications as needed  - Offer frequent toileting  - Follow urinary retention protocol if ordered  Outcome: Progressing     Problem: PAIN - ADULT  Goal: Verbalizes/displays adequate comfort level or baseline comfort level  Description: Interventions:  - Encourage patient to monitor pain and request assistance  - Assess pain using appropriate pain scale  - Administer analgesics based on type and severity of pain and evaluate response  - Implement non-pharmacological measures as appropriate and evaluate response  - Consider cultural and social influences on pain and pain management  - Notify physician/advanced practitioner if interventions unsuccessful or patient reports new pain  Outcome: Progressing

## 2025-03-01 NOTE — ASSESSMENT & PLAN NOTE
"Pt presents with altered mental status  Pt reports taking all home medication  On exam, pt with slow responses but alert & orientation x 4   Chest x-ray: \"Interstitial and vascular prominence in the lungs suggesting mild CHF. Correlate with clinical scenario.\"   CT head: \"No acute intracranial abnormality.\"  Ammonia elevated at 86, trend  Likely etiology of encephalopathy is hepatic encephalopathy in the setting of gram-negative bacteremia, possible underlying pneumonia ongoing use of oxycodone.  Improving with current administration of lactulose and IV antibiotics.  Ethanol normal < 10  Urine drug screen normal   Appreciate GI recommendations  Oxycodone dose decreased to 5 mg every 6 hourly as needed.  "

## 2025-03-01 NOTE — PLAN OF CARE
Problem: RESPIRATORY - ADULT  Goal: Achieves optimal ventilation and oxygenation  Description: INTERVENTIONS:  - Assess for changes in respiratory status  - Assess for changes in mentation and behavior  - Position to facilitate oxygenation and minimize respiratory effort  - Oxygen administered by appropriate delivery if ordered  - Initiate smoking cessation education as indicated  - Encourage broncho-pulmonary hygiene including cough, deep breathe, Incentive Spirometry  - Assess the need for suctioning and aspirate as needed  - Assess and instruct to report SOB or any respiratory difficulty  - Respiratory Therapy support as indicated  3/1/2025 0937 by Aster Lambert RN  Outcome: Progressing  3/1/2025 0937 by Aster Lambert RN  Outcome: Progressing     Problem: GASTROINTESTINAL - ADULT  Goal: Minimal or absence of nausea and/or vomiting  Description: INTERVENTIONS:  - Administer IV fluids if ordered to ensure adequate hydration  - Maintain NPO status until nausea and vomiting are resolved  - Nasogastric tube if ordered  - Administer ordered antiemetic medications as needed  - Provide nonpharmacologic comfort measures as appropriate  - Advance diet as tolerated, if ordered  - Consider nutrition services referral to assist patient with adequate nutrition and appropriate food choices  3/1/2025 0937 by Aster Lambert RN  Outcome: Progressing  3/1/2025 0937 by Aster Lambert RN  Outcome: Progressing  Goal: Maintains or returns to baseline bowel function  Description: INTERVENTIONS:  - Assess bowel function  - Encourage oral fluids to ensure adequate hydration  - Administer IV fluids if ordered to ensure adequate hydration  - Administer ordered medications as needed  - Encourage mobilization and activity  - Consider nutritional services referral to assist patient with adequate nutrition and appropriate food choices  3/1/2025 0937 by Aster Lambert RN  Outcome: Progressing  3/1/2025 0937 by Aster Lambert RN  Outcome:  Progressing     Problem: GENITOURINARY - ADULT  Goal: Maintains or returns to baseline urinary function  Description: INTERVENTIONS:  - Assess urinary function  - Encourage oral fluids to ensure adequate hydration if ordered  - Administer IV fluids as ordered to ensure adequate hydration  - Administer ordered medications as needed  - Offer frequent toileting  - Follow urinary retention protocol if ordered  3/1/2025 0937 by Aster Lambert RN  Outcome: Progressing  3/1/2025 0937 by Aster Lambert RN  Outcome: Progressing     Problem: METABOLIC, FLUID AND ELECTROLYTES - ADULT  Goal: Electrolytes maintained within normal limits  Description: INTERVENTIONS:  - Monitor labs and assess patient for signs and symptoms of electrolyte imbalances  - Administer electrolyte replacement as ordered  - Monitor response to electrolyte replacements, including repeat lab results as appropriate  - Instruct patient on fluid and nutrition as appropriate  3/1/2025 0937 by Aster Lambert RN  Outcome: Progressing  3/1/2025 0937 by Aster Lambert RN  Outcome: Progressing  Goal: Fluid balance maintained  Description: INTERVENTIONS:  - Monitor labs   - Monitor I/O and WT  - Instruct patient on fluid and nutrition as appropriate  - Assess for signs & symptoms of volume excess or deficit  3/1/2025 0937 by Aster Lambert RN  Outcome: Progressing  3/1/2025 0937 by Aster Lambert RN  Outcome: Progressing     Problem: PAIN - ADULT  Goal: Verbalizes/displays adequate comfort level or baseline comfort level  Description: Interventions:  - Encourage patient to monitor pain and request assistance  - Assess pain using appropriate pain scale  - Administer analgesics based on type and severity of pain and evaluate response  - Implement non-pharmacological measures as appropriate and evaluate response  - Consider cultural and social influences on pain and pain management  - Notify physician/advanced practitioner if interventions unsuccessful or patient reports new  pain  3/1/2025 0937 by Aster Lambert RN  Outcome: Progressing  3/1/2025 0937 by Aster Lambert RN  Outcome: Progressing     Problem: INFECTION - ADULT  Goal: Absence or prevention of progression during hospitalization  Description: INTERVENTIONS:  - Assess and monitor for signs and symptoms of infection  - Monitor lab/diagnostic results  - Monitor all insertion sites, i.e. indwelling lines, tubes, and drains  - Monitor endotracheal if appropriate and nasal secretions for changes in amount and color  - [place] appropriate cooling/warming therapies per order  - Administer medications as ordered  - Instruct and encourage patient and family to use good hand hygiene technique  - Identify and instruct in appropriate isolation precautions for identified infection/condition  3/1/2025 0937 by Aster Lambert RN  Outcome: Progressing  3/1/2025 0937 by Aster Lambert RN  Outcome: Progressing     Problem: SAFETY ADULT  Goal: Patient will remain free of falls  Description: INTERVENTIONS:  - Educate patient/family on patient safety including physical limitations  - Instruct patient to call for assistance with activity   - Consult OT/PT to assist with strengthening/mobility   - Keep Call bell within reach  - Keep bed low and locked with side rails adjusted as appropriate  - Keep care items and personal belongings within reach  - Initiate and maintain comfort rounds  - Make Fall Risk Sign visible to staff  - Offer Toileting every 2 Hours, in advance of need  - Initiate/Maintain bed alarm  - Obtain necessary fall risk management equipment  - Apply yellow socks and bracelet for high fall risk patients  - Consider moving patient to room near nurses station  3/1/2025 0937 by Aster Lambert RN  Outcome: Progressing  3/1/2025 0937 by Aster Lambert RN  Outcome: Progressing  Goal: Maintain or return to baseline ADL function  Description: INTERVENTIONS:  -  Assess patient's ability to carry out ADLs; assess patient's baseline for ADL function  and identify physical deficits which impact ability to perform ADLs (bathing, care of mouth/teeth, toileting, grooming, dressing, etc.)  - Assess/evaluate cause of self-care deficits   - Assess range of motion  - Assess patient's mobility; develop plan if impaired  - Assess patient's need for assistive devices and provide as appropriate  - Encourage maximum independence but intervene and supervise when necessary  - Involve family in performance of ADLs  - Assess for home care needs following discharge   - Consider OT consult to assist with ADL evaluation and planning for discharge  - Provide patient education as appropriate  3/1/2025 0937 by Aster Lambert RN  Outcome: Progressing  3/1/2025 0937 by Aster Lambert RN  Outcome: Progressing  Goal: Maintains/Returns to pre admission functional level  Description: INTERVENTIONS:  - Perform AM-PAC 6 Click Basic Mobility/ Daily Activity assessment daily.  - Set and communicate daily mobility goal to care team and patient/family/caregiver.   - Collaborate with rehabilitation services on mobility goals if consulted  - Ambulate patient 3 times a day  - Out of bed to chair 3 times a day   - Out of bed for meals 3 times a day  - Out of bed for toileting  - Record patient progress and toleration of activity level   3/1/2025 0937 by Aster Lambert RN  Outcome: Progressing  3/1/2025 0937 by Aster Lambert RN  Outcome: Progressing     Problem: DISCHARGE PLANNING  Goal: Discharge to home or other facility with appropriate resources  Description: INTERVENTIONS:  - Identify barriers to discharge w/patient and caregiver  - Arrange for needed discharge resources and transportation as appropriate  - Identify discharge learning needs (meds, wound care, etc.)  - Arrange for interpretive services to assist at discharge as needed  - Refer to Case Management Department for coordinating discharge planning if the patient needs post-hospital services based on physician/advanced practitioner order or  complex needs related to functional status, cognitive ability, or social support system  3/1/2025 0937 by Aster Lambert RN  Outcome: Progressing  3/1/2025 0937 by Aster Lambert RN  Outcome: Progressing     Problem: Knowledge Deficit  Goal: Patient/family/caregiver demonstrates understanding of disease process, treatment plan, medications, and discharge instructions  Description: Complete learning assessment and assess knowledge base.  Interventions:  - Provide teaching at level of understanding  - Provide teaching via preferred learning methods  3/1/2025 0937 by Aster Lambert RN  Outcome: Progressing  3/1/2025 0937 by Aster Lambert RN  Outcome: Progressing

## 2025-03-01 NOTE — ASSESSMENT & PLAN NOTE
"Noted hx of HYATT cirrhosis  EGD from 1/2025 no evidence of varices  Home medications: lactulose, rifaximin, carvedilol, and torsemide  Give lasix 40 mg   Continue home medications   Lipase normal at 11, CK normal at 43  Daily weights   ECHO 10/2024: \"Left ventricular cavity size is normal. There is mild concentric hypertrophy. The left ventricular ejection fraction is 60%. Systolic function is normal. Wall motion is normal. Diastolic function is normal. The left atrium is mildly dilated. There is mild mitral valve regurgitation. There is mild tricuspid valve regurgitation. The right ventricular systolic pressure is normal. There is no pericardial effusion.\"  MELD-Na-15  Continue with diuretics, continue with lactulose and titrate to 3-4 soft BMs daily.  Had last variceal screening on 1/18/2025 with no esophageal varices.  Appreciate GI recommendations.  Close outpatient GI follow-up on discharge  "

## 2025-03-01 NOTE — ASSESSMENT & PLAN NOTE
"Met sepsis criteria with temp of 103, tachycardia of 101, tachypnea with of 32, WBC of 1.76  Chest x-ray: \"Interstitial and vascular prominence in the lungs suggesting mild CHF. Correlate with clinical scenario.\"   CT head: \"No acute intracranial abnormality.\"  CT chestMultifocal groundglass and consolidative opacities in a peribronchovascular distribution with multilobar involvement, though predominantly affecting the right lower lobe. Findings are suspicious for pneumonia/aspiration pneumonitis.   Procalcitonin normal at < 0.05, lactic acid normal at 1.6  Blood culture 1 of 2 bottles with gram-negative sarahi.  Final cultures with Moraxella osloensis likely contaminant   Patient was maintained on IV ceftriaxone and transition to Keflex upon discharge   COVID/Flu negative  UA normal   Lactic acid normal at 1.6, Procalcitonin normal < 0.05-->0.17      "

## 2025-03-01 NOTE — ASSESSMENT & PLAN NOTE
Presented with fever and leukopenia  Admission blood culture 1 of 2 bottles with gram-negative rods  Final culture showingMoraxella osloensis likely contaminant.

## 2025-03-01 NOTE — ASSESSMENT & PLAN NOTE
Noted hx of chronic, diffuse pain.   Continue home regimen Decrease dose of oxycodone to 5 mg every 6 hourly as needed.

## 2025-03-01 NOTE — NURSING NOTE
IV catheters removed and intact. AVS reviewed with patient who verbalized understanding. Patient escorted out by RN

## 2025-03-01 NOTE — DISCHARGE SUMMARY
"Discharge Summary - Hospitalist   Name: Bailey Olsen 54 y.o. female I MRN: [id number]  Unit/Bed#: -01 I Date of Admission: 2/25/2025   Date of Service: 3/1/2025 I Hospital Day: 4     Assessment & Plan  Sepsis (HCC)  Met sepsis criteria with temp of 103, tachycardia of 101, tachypnea with of 32, WBC of 1.76  Chest x-ray: \"Interstitial and vascular prominence in the lungs suggesting mild CHF. Correlate with clinical scenario.\"   CT head: \"No acute intracranial abnormality.\"  CT chestMultifocal groundglass and consolidative opacities in a peribronchovascular distribution with multilobar involvement, though predominantly affecting the right lower lobe. Findings are suspicious for pneumonia/aspiration pneumonitis.   Procalcitonin normal at < 0.05, lactic acid normal at 1.6  Blood culture 1 of 2 bottles with gram-negative sarahi.  Final cultures with Moraxella osloensis likely contaminant   Patient was maintained on IV ceftriaxone and transition to Keflex upon discharge   COVID/Flu negative  UA normal   Lactic acid normal at 1.6, Procalcitonin normal < 0.05-->0.17      Liver cirrhosis secondary to HYATT (nonalcoholic steatohepatitis) (HCC)  Noted hx of HYATT cirrhosis  EGD from 1/2025 no evidence of varices  Home medications: lactulose, rifaximin, carvedilol, and torsemide  Give lasix 40 mg   Continue home medications   Lipase normal at 11, CK normal at 43  Daily weights   ECHO 10/2024: \"Left ventricular cavity size is normal. There is mild concentric hypertrophy. The left ventricular ejection fraction is 60%. Systolic function is normal. Wall motion is normal. Diastolic function is normal. The left atrium is mildly dilated. There is mild mitral valve regurgitation. There is mild tricuspid valve regurgitation. The right ventricular systolic pressure is normal. There is no pericardial effusion.\"  MELD-Na-15  Continue with diuretics, continue with lactulose and titrate to 3-4 soft BMs daily.  Had last variceal screening " "on 1/18/2025 with no esophageal varices.  Appreciate GI recommendations.  Close outpatient GI follow-up on discharge  Acute metabolic encephalopathy  Pt presents with altered mental status  Pt reports taking all home medication  On exam, pt with slow responses but alert & orientation x 4   Chest x-ray: \"Interstitial and vascular prominence in the lungs suggesting mild CHF. Correlate with clinical scenario.\"   CT head: \"No acute intracranial abnormality.\"  Ammonia elevated at 86, trend  Likely etiology of encephalopathy is hepatic encephalopathy in the setting of gram-negative bacteremia, possible underlying pneumonia ongoing use of oxycodone.  Improving with current administration of lactulose and IV antibiotics.  Ethanol normal < 10  Urine drug screen normal   Appreciate GI recommendations  Oxycodone dose decreased to 5 mg every 6 hourly as needed.  Hyperammonemia ([facility])  Lab Results   Component Value Date    AMMONIA 25 02/27/2025    AMMONIA 60 02/26/2025    AMMONIA 86 (H) 02/25/2025    AMMONIA 77 (H) 02/12/2025   Ammonia noted to be 86 on admission  Pt reports taking all home medications  Received lactulose 30 g in the ED, continue TID with goal of 3 BMs  Ammonia level had normalized prior to discharge  Appreciate GI recommendations  Hypomagnesemia  Lab Results   Component Value Date    MG 2.2 02/26/2025    MG 1.5 (L) 02/25/2025    MG 1.7 (L) 02/13/2025   Magnesium noted to be 1.5 on admission  Give mag sulfate 2 g IV x 2  Monitor magnesium  Class 3 severe obesity due to excess calories in adult ([facility])  Patient's BMI noted to be 47.07 on admission; during previous hospitalization from 2/3-2/13 BMI noted to be 45.31  Recommend lifestyle changes with increased green leafy vegetables and fruits with decreased red meat and processed foods  Recommend moderate to vigorous intensity exercise for 30 minutes at least 5 days a week or ~150min/week  Primary hypertension  Noted hx of HTN  Home medications atenolol, midodrine, " spironolactone, torsemide  Continue home medications  Blood pressure noted to be 126/61 on admission, current blood pressure is 128/59  Monitor blood pressure  Fibromyalgia  Noted hx of chronic, diffuse pain.   Continue home regimen Decrease dose of oxycodone to 5 mg every 6 hourly as needed.  Iron deficiency anemia  Lab Results   Component Value Date    HGB 8.6 (L) 02/28/2025    HGB 8.4 (L) 02/27/2025    HGB 8.1 (L) 02/26/2025    HCT 28.3 (L) 02/28/2025    HCT 26.8 (L) 02/27/2025    HCT 25.9 (L) 02/26/2025    Noted hx   Iron panel pending   Plan to transfuse PRBC if hgb < 7  Trend CBC  Pancytopenia (HCC)  Lab Results   Component Value Date    RBC 2.91 (L) 02/28/2025    RBC 2.77 (L) 02/27/2025    RBC 2.65 (L) 02/26/2025    WBC 0.98 (L) 02/28/2025    WBC 0.94 (L) 02/27/2025    WBC 0.96 (L) 02/26/2025    PLT 57 (L) 02/28/2025    PLT 57 (L) 02/27/2025    PLT 62 (L) 02/26/2025   Likely secondary to liver cirrhosis  Will hold off on DVT prophylaxis, continue SCDs  Sarcoidosis  Noted hx   Hold home dapsone in view of pancytopenia  Monitor counts closely as patient does have baseline pancytopenia  Acute respiratory failure (HCC)  Pt currently on 2 L NC at 95%  Pt denies any baseline home oxygen needs  VBG pending   Wean oxygen as able  Gram-negative bacteremia  Presented with fever and leukopenia  Admission blood culture 1 of 2 bottles with gram-negative rods  Final culture showingMoraxella osloensis likely contaminant.     Medical Problems       Resolved Problems  Date Reviewed: 2/10/2025   None         MESSAGE TO PCP (Nayely Brown DO) FOR FOLLOW UP:   Thank you for allowing us to participate in the care of your patient, Bailey Olsen, who was hospitalized from 2/25/2025 through 03/01/25 with the admitting diagnosis of .    Hepatic encephalopathy.  Also had aspiration pneumonia  Medication Changes:  Keflex to complete 5 additional days of treatment  Decrease dose of oxycodone to 5 mg every 6 hourly as  needed  Outpatient testing recommended:  To have follow-up  If you have any additional questions or would like to discuss further, please feel free to contact me.  Mar Oliva MD  [facility] Internal Medicine, Hospitalist, [phone]     Admission Date:   Admission Orders (From admission, onward)       Ordered        02/25/25 1922  INPATIENT ADMISSION  Once                          Discharge Date: 03/01/25    Consultations During Hospital Stay:  Gastroenterology    Procedures Performed:     1.  CT chest abdomen and pelvis multifocal groundglass and consolidative opacities in a peribronchovascular distribution with  multilobar involvement, though predominantly affecting the right lower lobe. Findings are suspicious for pneumonia/aspiration pneumonitis.2. Hepatic cirrhosis with findings of portal hypertension. Small volume ascites.       Significant Findings / Test Results:   NA    Incidental Findings:   NA       Test Results Pending at Discharge (will require follow up):   NA     Complications:  None     Reason for Admission: Confusion    Hospital Course:   Bailey Olsen is a 54 y.o. female patient who originally presented to the hospital on 2/25/2025 due to confusion and metabolic encephalopathy in the setting of hepatic encephalopathy and hyperammonemia.  Likely secondary to underlying aspiration pneumonia.  Did have 1 set of blood culture with gram-negative sarahi likely a contaminant based on final culture results.  Transition from ceftriaxone to Keflex to complete course of treatment.  Recommended decrease dose of oxycodone to 5 mg every 6 hourly as needed.  Stable for discharge today        Please see above list of diagnoses and related plan for additional information.     Condition at Discharge: stable    Discharge Day Visit / Exam:   Subjective: Patient seen and examined at bedside mentation back to baseline.  No acute events overnight.  Vitals: Blood Pressure: 119/75 (03/01/25 0725)  Pulse: 72 (03/01/25  0725)  Temperature: 97.7 °F (36.5 °C) (03/01/25 0725)  Temp Source: Temporal (02/28/25 2332)  Respirations: 17 (03/01/25 0725)  Weight - Scale: 114 kg (252 lb 6.4 oz) (03/01/25 0600)  SpO2: 96 % (03/01/25 0725)  Physical Exam  Constitutional:       General: She is not in acute distress.     Appearance: She is obese.   HENT:      Head: Normocephalic.      Nose: Nose normal.      Mouth/Throat:      Mouth: Mucous membranes are moist.   Eyes:      Extraocular Movements: Extraocular movements intact.      Pupils: Pupils are equal, round, and reactive to light.   Cardiovascular:      Rate and Rhythm: Normal rate and regular rhythm.   Pulmonary:      Effort: Pulmonary effort is normal.      Breath sounds: Normal breath sounds.   Abdominal:      General: Bowel sounds are normal.      Palpations: Abdomen is soft.   Musculoskeletal:      Comments: Trace lower extremity edema   Neurological:      General: No focal deficit present.      Mental Status: She is alert and oriented to person, place, and time. Mental status is at baseline.          Discussion with Family: Updated  () via phone.    Discharge instructions/Information to patient and family:   See after visit summary for information provided to patient and family.      Provisions for Follow-Up Care:  See after visit summary for information related to follow-up care and any pertinent home health orders.      Mobility at time of Discharge:   Basic Mobility Inpatient Raw Score: 19  JH-HLM Goal: 6: Walk 10 steps or more  JH-HLM Achieved: 7: Walk 25 feet or more  HLM Goal achieved. Continue to encourage appropriate mobility.     Disposition:   Home with VNA Services (Reminder: Complete face to face encounter)    Planned Readmission: no    Discharge Medications:  See after visit summary for reconciled discharge medications provided to patient and/or family.      Administrative Statements   Discharge Statement:  I have spent a total time of >30 minutes in  caring for this patient on the day of the visit/encounter. >30 minutes of time was spent on: Diagnostic results, Prognosis, Risks and benefits of tx options, Instructions for management, Patient and family education, Importance of tx compliance, Risk factor reductions, Impressions, Counseling / Coordination of care, Documenting in the medical record, Reviewing / ordering tests, medicine, procedures  , and Communicating with other healthcare professionals .    **Please Note: This note may have been constructed using a voice recognition system**

## 2025-03-01 NOTE — ASSESSMENT & PLAN NOTE
Lab Results   Component Value Date    AMMONIA 25 02/27/2025    AMMONIA 60 02/26/2025    AMMONIA 86 (H) 02/25/2025    AMMONIA 77 (H) 02/12/2025   Ammonia noted to be 86 on admission  Pt reports taking all home medications  Received lactulose 30 g in the ED, continue TID with goal of 3 BMs  Ammonia level had normalized prior to discharge  Appreciate GI recommendations

## 2025-03-03 ENCOUNTER — PATIENT OUTREACH (OUTPATIENT)
Dept: CASE MANAGEMENT | Facility: OTHER | Age: 55
End: 2025-03-03

## 2025-03-03 LAB — BACTERIA BLD CULT: NORMAL

## 2025-03-03 NOTE — UTILIZATION REVIEW
NOTIFICATION OF ADMISSION DISCHARGE   This is a Notification of Discharge from [facility]. Please be advised that this patient has been discharge from our facility. Below you will find the admission and discharge date and time including the patient’s disposition.   UTILIZATION REVIEW CONTACT:  Елена Self  Utilization   Network Utilization Review Department  Phone: [phone] x carefully listen to the prompts. All voicemails are confidential.  Email: [email or web address]     ADMISSION INFORMATION  PRESENTATION DATE: 2/25/2025  6:09 PM  OBERVATION ADMISSION DATE: N/A  INPATIENT ADMISSION DATE: 2/25/25  7:23 PM   DISCHARGE DATE: 3/1/2025  1:11 PM   DISPOSITION:Home with Home Health Care    Network Utilization Review Department  ATTENTION: Please call with any questions or concerns to [phone] and carefully listen to the prompts so that you are directed to the right person. All voicemails are confidential.   For Discharge needs, contact Care Management DC Support Team at [phone] opt. 2  Send all requests for admission clinical reviews, approved or denied determinations and any other requests to dedicated fax number below belonging to the campus where the patient is receiving treatment. List of dedicated fax numbers for the Facilities:  FACILITY NAME UR FAX NUMBER   ADMISSION DENIALS (Administrative/Medical Necessity) [phone]   DISCHARGE SUPPORT TEAM ([facility]) [phone]   PARENT CHILD HEALTH (Maternity/NICU/Pediatrics) [phone]   [facility] [phone]   [facility] [phone]   [facility] [phone]   [facility] [phone]   [facility] [phone]   [facility] [phone]   [facility] [phone]   [facility]  [facility] [phone]   [facility] [phone]   [facility] [phone]   [facility] [phone]   [facility] [phone]

## 2025-03-03 NOTE — PROGRESS NOTES
Referral received via email. Referred to High Utilizer Care Plan Committee on 2/26/25 to be reviewed for careplan.  Case placed on department's Rising Risk Watch List.      Email received from High Utilizer Committee today.  Committee reviewed and determined a care plan is not appropriate at this time.  Recommendation from committee is for patient to  follow closely with Hepatology Specialist or GI as an outpatient as patient may need frequent adjustments of lactulose dosing based on frequency of Bms.    Patient is referred to OP CM as a Rising Utilizer, program identified and this RN was assigned to this case.  OPCM Watch List database updated.      Chart review completed.    Patient was admitted to [facility] on 2/25/25 for sepsis d/t pneumonia.    Patient was cleared to discharge to home on 3/1/25 with services from [facility].    There have been 8 hospitalization and  2 ED visits in the past 6 months.  High Risk Admission / ED Risk Score:  49    PMH includes: liver cirrhosis secondary to HYATT, HTN, fibromyalgia, anemia, acute metabolic encephalopathy, obesity with BMI greater than 40    Future Appointments:  None found in EPIC    Discharge follow up call attempted.  No answer.  Voice mail full and unable to leave messaged.    Phone call make to patient's spouse's cell phone number listed under communication list. Spoke with spouse.  Spouse states he is on his way home and will have patient return my phone call.    Patient returned my phone call.  CM introduced self with explanation of purpose of phone call. Explained that support would be telephonic to provide information about disease management and treatment plan,  medications and identify any SDOH needs.   Patient states she has many financial needs but is aware she does not qualify for any programs and therefore is not interested in  OPCM Services.  Patient then ended this call.

## 2025-03-27 PROBLEM — A41.9 SEPSIS (HCC): Status: RESOLVED | Noted: 2025-02-25 | Resolved: 2025-03-27

## 2025-04-21 ENCOUNTER — HOSPITAL ENCOUNTER (EMERGENCY)
Facility: HOSPITAL | Age: 55
Discharge: HOME | End: 2025-04-21
Attending: EMERGENCY MEDICINE
Payer: COMMERCIAL

## 2025-04-21 ENCOUNTER — APPOINTMENT (EMERGENCY)
Dept: CT IMAGING | Facility: HOSPITAL | Age: 55
End: 2025-04-21
Payer: COMMERCIAL

## 2025-04-21 VITALS
BODY MASS INDEX: 44.21 KG/M2 | DIASTOLIC BLOOD PRESSURE: 58 MMHG | RESPIRATION RATE: 20 BRPM | OXYGEN SATURATION: 96 % | WEIGHT: 265.65 LBS | SYSTOLIC BLOOD PRESSURE: 123 MMHG | HEART RATE: 86 BPM | TEMPERATURE: 98.5 F

## 2025-04-21 DIAGNOSIS — R53.1 GENERALIZED WEAKNESS: Primary | ICD-10-CM
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
## 2025-04-21 DIAGNOSIS — N39.0 ACUTE UTI: ICD-10-CM

## 2025-04-21 LAB
ALBUMIN SERPL BCG-MCNC: 3.1 G/DL (ref 3.5–5)
ALP SERPL-CCNC: 199 U/L (ref 34–104)
ALT SERPL W P-5'-P-CCNC: 17 U/L (ref 7–52)
AMMONIA PLAS-SCNC: 65 UMOL/L (ref 18–72)
ANION GAP SERPL CALCULATED.3IONS-SCNC: 5 MMOL/L (ref 4–13)
APTT PPP: 32 SECONDS (ref 23–34)
AST SERPL W P-5'-P-CCNC: 46 U/L (ref 13–39)
ATRIAL RATE: 84 BPM
BACTERIA UR QL AUTO: ABNORMAL /HPF
BASOPHILS # BLD AUTO: 0.04 THOUSANDS/ÂΜL (ref 0–0.1)
BASOPHILS NFR BLD AUTO: 2 % (ref 0–1)
BILIRUB SERPL-MCNC: 1.03 MG/DL (ref 0.2–1)
BILIRUB UR QL STRIP: NEGATIVE
BNP SERPL-MCNC: 32 PG/ML (ref 0–100)
BUN SERPL-MCNC: 17 MG/DL (ref 5–25)
CALCIUM ALBUM COR SERPL-MCNC: 9.2 MG/DL (ref 8.3–10.1)
CALCIUM SERPL-MCNC: 8.5 MG/DL (ref 8.4–10.2)
CARDIAC TROPONIN I PNL SERPL HS: 3 NG/L (ref ?–50)
CHLORIDE SERPL-SCNC: 102 MMOL/L (ref 96–108)
CLARITY UR: CLEAR
CO2 SERPL-SCNC: 29 MMOL/L (ref 21–32)
COLOR UR: YELLOW
CREAT SERPL-MCNC: 1.14 MG/DL (ref 0.6–1.3)
EOSINOPHIL # BLD AUTO: 0.09 THOUSAND/ÂΜL (ref 0–0.61)
EOSINOPHIL NFR BLD AUTO: 4 % (ref 0–6)
ERYTHROCYTE [DISTWIDTH] IN BLOOD BY AUTOMATED COUNT: 16.3 % (ref 11.6–15.1)
GFR SERPL CREATININE-BSD FRML MDRD: 54 ML/MIN/1.73SQ M
GLUCOSE SERPL-MCNC: 88 MG/DL (ref 65–140)
GLUCOSE UR STRIP-MCNC: NEGATIVE MG/DL
HCT VFR BLD AUTO: 28.2 % (ref 34.8–46.1)
HGB BLD-MCNC: 8.5 G/DL (ref 11.5–15.4)
HGB UR QL STRIP.AUTO: NEGATIVE
IMM GRANULOCYTES # BLD AUTO: 0.01 THOUSAND/UL (ref 0–0.2)
IMM GRANULOCYTES NFR BLD AUTO: 0 % (ref 0–2)
INR PPP: 1.28 (ref 0.85–1.19)
KETONES UR STRIP-MCNC: NEGATIVE MG/DL
LACTATE SERPL-SCNC: 1.1 MMOL/L (ref 0.5–2)
LEUKOCYTE ESTERASE UR QL STRIP: ABNORMAL
LIPASE SERPL-CCNC: 21 U/L (ref 11–82)
LYMPHOCYTES # BLD AUTO: 0.54 THOUSANDS/ÂΜL (ref 0.6–4.47)
LYMPHOCYTES NFR BLD AUTO: 23 % (ref 14–44)
MAGNESIUM SERPL-MCNC: 2.1 MG/DL (ref 1.9–2.7)
MCH RBC QN AUTO: 28.4 PG (ref 26.8–34.3)
MCHC RBC AUTO-ENTMCNC: 30.1 G/DL (ref 31.4–37.4)
MCV RBC AUTO: 94 FL (ref 82–98)
MONOCYTES # BLD AUTO: 0.18 THOUSAND/ÂΜL (ref 0.17–1.22)
MONOCYTES NFR BLD AUTO: 8 % (ref 4–12)
NEUTROPHILS # BLD AUTO: 1.54 THOUSANDS/ÂΜL (ref 1.85–7.62)
NEUTS SEG NFR BLD AUTO: 63 % (ref 43–75)
NITRITE UR QL STRIP: NEGATIVE
NON-SQ EPI CELLS URNS QL MICRO: ABNORMAL /HPF
NRBC BLD AUTO-RTO: 0 /100 WBCS
P AXIS: 64 DEGREES
PH UR STRIP.AUTO: 7.5 [PH]
PHOSPHATE SERPL-MCNC: 4.2 MG/DL (ref 2.7–4.5)
PLATELET # BLD AUTO: 83 THOUSANDS/UL (ref 149–390)
PMV BLD AUTO: 10.9 FL (ref 8.9–12.7)
POTASSIUM SERPL-SCNC: 4.1 MMOL/L (ref 3.5–5.3)
PR INTERVAL: 198 MS
PROCALCITONIN SERPL-MCNC: 0.08 NG/ML
PROT SERPL-MCNC: 7.3 G/DL (ref 6.4–8.4)
PROT UR STRIP-MCNC: NEGATIVE MG/DL
PROTHROMBIN TIME: 16.4 SECONDS (ref 12.3–15)
QRS AXIS: 43 DEGREES
QRSD INTERVAL: 100 MS
QT INTERVAL: 394 MS
QTC INTERVAL: 465 MS
RBC # BLD AUTO: 2.99 MILLION/UL (ref 3.81–5.12)
RBC #/AREA URNS AUTO: ABNORMAL /HPF
SODIUM SERPL-SCNC: 136 MMOL/L (ref 135–147)
SP GR UR STRIP.AUTO: 1.01 (ref 1–1.03)
T WAVE AXIS: 51 DEGREES
UROBILINOGEN UR QL STRIP.AUTO: 0.2 E.U./DL
VENTRICULAR RATE: 84 BPM
WBC # BLD AUTO: 2.4 THOUSAND/UL (ref 4.31–10.16)
WBC #/AREA URNS AUTO: ABNORMAL /HPF
WBC CLUMPS # UR AUTO: PRESENT /UL

## 2025-04-21 PROCEDURE — 99285 EMERGENCY DEPT VISIT HI MDM: CPT

## 2025-04-21 PROCEDURE — 83690 ASSAY OF LIPASE: CPT | Performed by: EMERGENCY MEDICINE

## 2025-04-21 PROCEDURE — 83880 ASSAY OF NATRIURETIC PEPTIDE: CPT | Performed by: EMERGENCY MEDICINE

## 2025-04-21 PROCEDURE — 36415 COLL VENOUS BLD VENIPUNCTURE: CPT | Performed by: EMERGENCY MEDICINE

## 2025-04-21 PROCEDURE — 83605 ASSAY OF LACTIC ACID: CPT | Performed by: EMERGENCY MEDICINE

## 2025-04-21 PROCEDURE — 71260 CT THORAX DX C+: CPT

## 2025-04-21 PROCEDURE — 96375 TX/PRO/DX INJ NEW DRUG ADDON: CPT

## 2025-04-21 PROCEDURE — 93005 ELECTROCARDIOGRAM TRACING: CPT

## 2025-04-21 PROCEDURE — 85730 THROMBOPLASTIN TIME PARTIAL: CPT | Performed by: EMERGENCY MEDICINE

## 2025-04-21 PROCEDURE — 84145 PROCALCITONIN (PCT): CPT | Performed by: EMERGENCY MEDICINE

## 2025-04-21 PROCEDURE — 84484 ASSAY OF TROPONIN QUANT: CPT | Performed by: EMERGENCY MEDICINE

## 2025-04-21 PROCEDURE — 70450 CT HEAD/BRAIN W/O DYE: CPT

## 2025-04-21 PROCEDURE — 96374 THER/PROPH/DIAG INJ IV PUSH: CPT

## 2025-04-21 PROCEDURE — 83735 ASSAY OF MAGNESIUM: CPT | Performed by: EMERGENCY MEDICINE

## 2025-04-21 PROCEDURE — 84100 ASSAY OF PHOSPHORUS: CPT | Performed by: EMERGENCY MEDICINE

## 2025-04-21 PROCEDURE — 85610 PROTHROMBIN TIME: CPT | Performed by: EMERGENCY MEDICINE

## 2025-04-21 PROCEDURE — 80053 COMPREHEN METABOLIC PANEL: CPT | Performed by: EMERGENCY MEDICINE

## 2025-04-21 PROCEDURE — 85025 COMPLETE CBC W/AUTO DIFF WBC: CPT | Performed by: EMERGENCY MEDICINE

## 2025-04-21 PROCEDURE — 81001 URINALYSIS AUTO W/SCOPE: CPT | Performed by: EMERGENCY MEDICINE

## 2025-04-21 PROCEDURE — 87086 URINE CULTURE/COLONY COUNT: CPT | Performed by: EMERGENCY MEDICINE

## 2025-04-21 PROCEDURE — 74177 CT ABD & PELVIS W/CONTRAST: CPT

## 2025-04-21 PROCEDURE — 82140 ASSAY OF AMMONIA: CPT | Performed by: EMERGENCY MEDICINE

## 2025-04-21 PROCEDURE — 93010 ELECTROCARDIOGRAM REPORT: CPT | Performed by: INTERNAL MEDICINE

## 2025-04-21 PROCEDURE — 99285 EMERGENCY DEPT VISIT HI MDM: CPT | Performed by: EMERGENCY MEDICINE

## 2025-04-21 PROCEDURE — 72125 CT NECK SPINE W/O DYE: CPT

## 2025-04-21 RX ORDER — CEFUROXIME AXETIL 250 MG/1
500 TABLET ORAL ONCE
Status: COMPLETED | OUTPATIENT
Start: 2025-04-21 | End: 2025-04-21

## 2025-04-21 RX ORDER — ONDANSETRON 2 MG/ML
4 INJECTION INTRAMUSCULAR; INTRAVENOUS ONCE
Status: COMPLETED | OUTPATIENT
Start: 2025-04-21 | End: 2025-04-21

## 2025-04-21 RX ORDER — CEFUROXIME AXETIL 500 MG/1
500 TABLET ORAL EVERY 12 HOURS SCHEDULED
Qty: 14 TABLET | Refills: 0 | Status: SHIPPED | OUTPATIENT
Start: 2025-04-21 | End: 2025-04-28

## 2025-04-21 RX ORDER — HYDROMORPHONE HCL IN WATER/PF 6 MG/30 ML
0.2 PATIENT CONTROLLED ANALGESIA SYRINGE INTRAVENOUS ONCE
Status: COMPLETED | OUTPATIENT
Start: 2025-04-21 | End: 2025-04-21

## 2025-04-21 RX ADMIN — HYDROMORPHONE HYDROCHLORIDE 0.2 MG: 0.2 INJECTION, SOLUTION INTRAMUSCULAR; INTRAVENOUS; SUBCUTANEOUS at 09:51

## 2025-04-21 RX ADMIN — CEFUROXIME AXETIL 500 MG: 250 TABLET ORAL at 14:16

## 2025-04-21 RX ADMIN — ONDANSETRON 4 MG: 2 INJECTION INTRAMUSCULAR; INTRAVENOUS at 09:51

## 2025-04-21 RX ADMIN — IOHEXOL 100 ML: 350 INJECTION, SOLUTION INTRAVENOUS at 10:41

## 2025-04-21 NOTE — ED PROVIDER NOTES
Time reflects when diagnosis was documented in both MDM as applicable and the Disposition within this note       Time User Action Codes Description Comment    4/21/2025  2:04 PM Bright Sterling [R53.1] Generalized weakness     4/21/2025  2:04 PM Bright Sterling [W19.XXXA] Fall, initial encounter     4/21/2025  2:04 PM Bright Sterling [N39.0] Acute UTI           ED Disposition       ED Disposition   Discharge    Condition   Stable    Date/Time   Mon Apr 21, 2025  2:03 PM    Comment   Bailey Olsen discharge to home/self care.                   Assessment & Plan       Medical Decision Making  Patient was seen and evaluated for her presentation as outlined.  Patient at risk for acute neurologic process, infection, hyperammonemia, anemia, acute infection, other.  Testing as shown.  No acute intracranial abnormality identified.  No cervical spine findings identified.  CT chest, abdomen, pelvis findings detailed below.  Doubt acute pneumonia, healing rib fractures shows no signs of displacement or associated pulmonary findings.  No acute findings in the thoracic or lumbar spine.  Labs show pancytopenia consistent with baseline.  No acute abnormalities appreciated.  Ammonia level within normal limits.  Urinalysis consistent with mild urinary tract infection.  Will start on antibiotics.  Patient reported reasonable symptom control in the ED.  No clear indication for hospital admission.  Feels comfortable going home.  Stable for discharge from the emergency department.  Advised close primary care follow-up.  Strict return precautions reviewed.  All questions answered.  See ED course notes.    Amount and/or Complexity of Data Reviewed  Labs: ordered.  Radiology: ordered.  ECG/medicine tests: ordered and independent interpretation performed.     Details: EKG per my interpretation demonstrates normal sinus rhythm at a ventricular rate of 84 bpm.  Normal axis, normal intervals.  No acute ST elevations or T wave  inversions.  Nonspecific ST abnormality present.  Compared with prior EKG from February 25, 2025, occasional PVCs no longer present, QTc interval has decreased by 30 ms, and nonspecific ST abnormality is now present.    Risk  Prescription drug management.        ED Course as of 04/23/25 0701   Mon Apr 21, 2025   1403 All test results discussed with patient.  Notified of urinalysis results suspicious for urinary tract infection.  No other acute findings identified.  No indication for admission.  Patient does feel comfortable going home.  Advised precaution regarding falls, recommended close primary care and specialty follow-up.       Medications   ondansetron (ZOFRAN) injection 4 mg (4 mg Intravenous Given 4/21/25 0951)   HYDROmorphone HCl (DILAUDID) injection 0.2 mg (0.2 mg Intravenous Given 4/21/25 0951)   iohexol (OMNIPAQUE) 350 MG/ML injection (MULTI-DOSE) 100 mL (100 mL Intravenous Given 4/21/25 1041)   cefuroxime (CEFTIN) tablet 500 mg (500 mg Oral Given 4/21/25 1416)       ED Risk Strat Scores                    No data recorded        SBIRT 20yo+      Flowsheet Row Most Recent Value   Initial Alcohol Screen: US AUDIT-C     1. How often do you have a drink containing alcohol? 0 Filed at: 04/21/2025 0926   2. How many drinks containing alcohol do you have on a typical day you are drinking?  0 Filed at: 04/21/2025 0926   3b. FEMALE Any Age, or MALE 65+: How often do you have 4 or more drinks on one occassion? 0 Filed at: 04/21/2025 0926   Audit-C Score 0 Filed at: 04/21/2025 0926   BERNIE: How many times in the past year have you...    Used an illegal drug or used a prescription medication for non-medical reasons? Never Filed at: 04/21/2025 0926                            History of Present Illness       Chief Complaint   Patient presents with    Fall     Patient reports she fell from standing x1 month ago and since has not felt right. Reports she also slipped Saturday, did not strike head, no thinners. Has small  area of bruising to R hip. Patient reports she has not felt herself since the fall, added she feels dizzy. Has history of high ammonia, patient reports she also has been SOB since the fall and typically this is how she feels when her ammonia is elevated.       Past Medical History:   Diagnosis Date    Back pain     Carcinoid tumor     neuroendocrine    CHF (congestive heart failure) (HCC)     Cirrhosis of liver (HCC)     Hypertension     Sarcoidosis       Past Surgical History:   Procedure Laterality Date    CHOLECYSTECTOMY      GASTRIC BYPASS      HERNIA REPAIR      IR KYPHOPLASTY/VERTEBROPLASTY  10/15/2024      Family History   Problem Relation Age of Onset    Hypertension Father     Heart attack Father     No Known Problems Sister     No Known Problems Brother     No Known Problems Brother     No Known Problems Brother     No Known Problems Brother     No Known Problems Brother     No Known Problems Daughter       Social History     Tobacco Use    Smoking status: Never    Smokeless tobacco: Never   Vaping Use    Vaping status: Never Used   Substance Use Topics    Alcohol use: Never    Drug use: Never      E-Cigarette/Vaping    E-Cigarette Use Never User       E-Cigarette/Vaping Substances    Nicotine No     THC No     CBD No     Flavoring No     Other No     Unknown No       I have reviewed and agree with the history as documented.     Patient presents to the emergency department accompanied by her  for evaluation of lightheadedness and dizziness as well as falling recently.  Patient has a history of Larsen, high ammonia levels.  States her symptoms feel somewhat similar to when her ammonia level has been high in the past.  She reports that she is compliant with her lactulose and has been having bowel movements.  She has not noticed any blood in the bowel movements.  Feels lightheaded and dizzy when she tries to stand and with activity.  Fell couple weeks ago into her kitchen counter and had some bruising to  the right lower abdomen that was significant at the time, has been improving.  She still has pain in the abdomen, however.  She also states that she slipped out of a chair on Saturday onto her buttocks.  She has had back pain since then.  She does have a history of prior vertebral fractures requiring surgery.  She denies any head strike, headache, or neck pain.  No extremity injury.  Pain is moderate in intensity.  No fevers.  No nausea or vomiting.  No chest pain or shortness of breath.  No other complaints, modifying factors, or associated symptoms.        Review of Systems   All other systems reviewed and are negative.          Objective       ED Triage Vitals   Temperature Pulse Blood Pressure Respirations SpO2 Patient Position - Orthostatic VS   04/21/25 0926 04/21/25 0926 04/21/25 0926 04/21/25 0926 04/21/25 0926 04/21/25 0926   98.5 °F (36.9 °C) 87 140/65 18 98 % Lying      Temp Source Heart Rate Source BP Location FiO2 (%) Pain Score    04/21/25 0926 04/21/25 0926 04/21/25 0929 -- 04/21/25 0927    Temporal Monitor Right arm  6      Vitals      Date and Time Temp Pulse SpO2 Resp BP Pain Score FACES Pain Rating User   04/21/25 1230 -- 86 96 % 20 123/58 -- -- RR   04/21/25 1220 -- 85 96 % -- 123/58 -- -- CP   04/21/25 1114 -- -- -- -- -- 6 -- RR   04/21/25 1030 -- 84 95 % 21 128/58 -- -- SS   04/21/25 1015 -- 83 96 % 20 112/57 -- -- SS   04/21/25 1000 -- 81 97 % 18 116/59 -- -- SS   04/21/25 0951 -- -- -- -- -- 7 -- SS   04/21/25 0930 -- 85 99 % 16 109/65 -- -- SS   04/21/25 0929 98.5 °F (36.9 °C) 85 99 % 18 140/65 -- -- SS   04/21/25 0927 -- -- -- -- -- 6 -- KK   04/21/25 0926 98.5 °F (36.9 °C) 87 98 % 18 140/65 -- -- KK            Physical Exam  Vitals and nursing note reviewed.   Constitutional:       General: She is not in acute distress.     Appearance: She is well-developed. She is obese. She is not ill-appearing or toxic-appearing.   HENT:      Head: Normocephalic and atraumatic.      Mouth/Throat:       Mouth: Mucous membranes are moist.   Eyes:      Extraocular Movements: Extraocular movements intact.      Conjunctiva/sclera: Conjunctivae normal.      Pupils: Pupils are equal, round, and reactive to light.   Cardiovascular:      Rate and Rhythm: Normal rate and regular rhythm.      Pulses: Normal pulses.      Heart sounds: Normal heart sounds. No murmur heard.     No friction rub. No gallop.   Pulmonary:      Effort: Pulmonary effort is normal. No respiratory distress.      Breath sounds: Normal breath sounds. No wheezing, rhonchi or rales.   Chest:      Chest wall: No tenderness.   Abdominal:      General: Abdomen is protuberant. There is no distension.      Palpations: Abdomen is soft.      Tenderness: There is generalized abdominal tenderness and tenderness in the right lower quadrant.      Comments: Ecchymotic area noted in the right lower quadrant, seems to be fading consistent with subacute injury.   Musculoskeletal:         General: No swelling. Normal range of motion.      Cervical back: Normal range of motion and neck supple. No tenderness.   Skin:     General: Skin is warm and dry.      Capillary Refill: Capillary refill takes less than 2 seconds.   Neurological:      General: No focal deficit present.      Mental Status: She is alert and oriented to person, place, and time.   Psychiatric:         Mood and Affect: Mood normal.         Behavior: Behavior normal.         Results Reviewed       Procedure Component Value Units Date/Time    Urine culture [[id number]] Collected: 04/21/25 1104    Lab Status: Preliminary result Specimen: Urine, Clean Catch Updated: 04/22/25 1326     Urine Culture Culture too young- will reincubate    Urine Microscopic [[id number]]  (Abnormal) Collected: 04/21/25 1104    Lab Status: Final result Specimen: Urine, Clean Catch Updated: 04/21/25 1150     RBC, UA 0-1 /hpf      WBC, UA 30-50 /hpf      Epithelial Cells Occasional /hpf      Bacteria, UA Occasional /hpf      WBC Clumps  Present    UA w Reflex to Microscopic w Reflex to Culture [[id number]]  (Abnormal) Collected: 04/21/25 1104    Lab Status: Final result Specimen: Urine, Clean Catch Updated: 04/21/25 1117     Color, UA Yellow     Clarity, UA Clear     Specific Gravity, UA 1.010     pH, UA 7.5     Leukocytes, UA Small     Nitrite, UA Negative     Protein, UA Negative mg/dl      Glucose, UA Negative mg/dl      Ketones, UA Negative mg/dl      Urobilinogen, UA 0.2 E.U./dl      Bilirubin, UA Negative     Occult Blood, UA Negative    B-Type Natriuretic Peptide(BNP) [[id number]]  (Normal) Collected: 04/21/25 0950    Lab Status: Final result Specimen: Blood from Arm, Right Updated: 04/21/25 1042     BNP 32 pg/mL     Procalcitonin [[id number]]  (Normal) Collected: 04/21/25 0950    Lab Status: Final result Specimen: Blood from Arm, Right Updated: 04/21/25 1028     Procalcitonin 0.08 ng/ml     HS Troponin 0hr (reflex protocol) [[id number]]  (Normal) Collected: 04/21/25 0950    Lab Status: Final result Specimen: Blood from Arm, Right Updated: 04/21/25 1028     hs TnI 0hr 3 ng/L     Lactic acid, plasma (w/reflex if result > 2.0) [[id number]]  (Normal) Collected: 04/21/25 0950    Lab Status: Final result Specimen: Blood from Arm, Right Updated: 04/21/25 1027     LACTIC ACID 1.1 mmol/L     Narrative:      Result may be elevated if tourniquet was used during collection.    Comprehensive metabolic panel [[id number]]  (Abnormal) Collected: 04/21/25 0950    Lab Status: Final result Specimen: Blood from Arm, Right Updated: 04/21/25 1027     Sodium 136 mmol/L      Potassium 4.1 mmol/L      Chloride 102 mmol/L      CO2 29 mmol/L      ANION GAP 5 mmol/L      BUN 17 mg/dL      Creatinine 1.14 mg/dL      Glucose 88 mg/dL      Calcium 8.5 mg/dL      Corrected Calcium 9.2 mg/dL      AST 46 U/L      ALT 17 U/L      Alkaline Phosphatase 199 U/L      Total Protein 7.3 g/dL      Albumin 3.1 g/dL      Total Bilirubin 1.03 mg/dL      eGFR 54 ml/min/1.73sq m      Narrative:      National Kid[employer] guidelines for Chronic Kidney Disease (CKD):     Stage 1 with normal or high GFR (GFR > 90 mL/min/1.73 square meters)    Stage 2 Mild CKD (GFR = 60-89 mL/min/1.73 square meters)    Stage 3A Moderate CKD (GFR = 45-59 mL/min/1.73 square meters)    Stage 3B Moderate CKD (GFR = 30-44 mL/min/1.73 square meters)    Stage 4 Severe CKD (GFR = 15-29 mL/min/1.73 square meters)    Stage 5 End Stage CKD (GFR <15 mL/min/1.73 square meters)  Note: GFR calculation is accurate only with a steady state creatinine    Phosphorus [[id number]]  (Normal) Collected: 04/21/25 0950    Lab Status: Final result Specimen: Blood from Arm, Right Updated: 04/21/25 1027     Phosphorus 4.2 mg/dL     Lipase [[id number]]  (Normal) Collected: 04/21/25 0950    Lab Status: Final result Specimen: Blood from Arm, Right Updated: 04/21/25 1027     Lipase 21 u/L     Magnesium [[id number]]  (Normal) Collected: 04/21/25 0950    Lab Status: Final result Specimen: Blood from Arm, Right Updated: 04/21/25 1027     Magnesium 2.1 mg/dL     Ammonia [[id number]]  (Normal) Collected: 04/21/25 0950    Lab Status: Final result Specimen: Blood from Arm, Right Updated: 04/21/25 1026     Ammonia 65 umol/L     Protime-INR [[id number]]  (Abnormal) Collected: 04/21/25 0950    Lab Status: Final result Specimen: Blood from Arm, Right Updated: 04/21/25 1015     Protime 16.4 seconds      INR 1.28    Narrative:      INR Therapeutic Range    Indication                                             INR Range      Atrial Fibrillation                                               2.0-3.0  Hypercoagulable State                                    2.0.2.3  Left Ventricular Asist Device                            2.0-3.0  Mechanical Heart Valve                                  -    Aortic(with afib, MI, embolism, HF, LA enlargement,    and/or coagulopathy)                                     2.0-3.0 (2.5-3.5)     Mitral                                                              2.5-3.5  Prosthetic/Bioprosthetic Heart Valve               2.0-3.0  Venous thromboembolism (VTE: VT, PE        2.0-3.0    APTT [[id number]]  (Normal) Collected: 04/21/25 0950    Lab Status: Final result Specimen: Blood from Arm, Right Updated: 04/21/25 1015     PTT 32 seconds     CBC and differential [[id number]]  (Abnormal) Collected: 04/21/25 0950    Lab Status: Final result Specimen: Blood from Arm, Right Updated: 04/21/25 1013     WBC 2.40 Thousand/uL      RBC 2.99 Million/uL      Hemoglobin 8.5 g/dL      Hematocrit 28.2 %      MCV 94 fL      MCH 28.4 pg      MCHC 30.1 g/dL      RDW 16.3 %      MPV 10.9 fL      Platelets 83 Thousands/uL      nRBC 0 /100 WBCs      Segmented % 63 %      Immature Grans % 0 %      Lymphocytes % 23 %      Monocytes % 8 %      Eosinophils Relative 4 %      Basophils Relative 2 %      Absolute Neutrophils 1.54 Thousands/µL      Absolute Immature Grans 0.01 Thousand/uL      Absolute Lymphocytes 0.54 Thousands/µL      Absolute Monocytes 0.18 Thousand/µL      Eosinophils Absolute 0.09 Thousand/µL      Basophils Absolute 0.04 Thousands/µL             CT head without contrast   Final Interpretation by Alli Hernandez MD (04/21 1057)      No acute intracranial process.      No skull fracture                  Workstation performed: [id number]         CT cervical spine without contrast   Final Interpretation by Alli Hernandez MD (04/21 1101)      No cervical spine fracture or traumatic malalignment.                  Workstation performed: [id number]         CT chest abdomen pelvis w contrast   Final Interpretation by Alli Hernandez MD (04/21 1142)      CT chest:      No evidence of acute posttraumatic thoracic process.      Nonspecific multilobar groundglass opacities, new on the right and slightly increased on the left. Findings may represent inflammatory or infectious process.      No acute fracture.      Incompletely  healed nondisplaced fracture of the right 10th lateral rib new since February 2025      Additional chronic findings and negatives as above.      CT abdomen and pelvis:      No evidence of acute posttraumatic abdominopelvic process.      No acute fracture.      Hepatic cirrhosis. No evidence of liver mass.      Minimal perihepatic ascites.      Additional chronic findings and negatives as above.               Workstation performed: [id number]         CT recon only thoracolumbar (no charge)   Final Interpretation by Alli Hernandez MD (04/21 1152)      No acute fracture or traumatic subluxation.               Workstation performed: [id number]             Procedures    ED Medication and Procedure Management   Prior to Admission Medications   Prescriptions Last Dose Informant Patient Reported? Taking?   amitriptyline (ELAVIL) 50 mg tablet   No No   Sig: Take 1 tablet (50 mg total) by mouth daily at bedtime   atenolol (TENORMIN) 25 mg tablet   No No   Sig: Take 1 tablet (25 mg total) by mouth daily   dapsone 25 mg tablet   Yes No   Sig: Take 50 mg by mouth daily   folic acid (FOLVITE) 1 mg tablet   Yes No   Sig: Take 1 mg by mouth daily   lactulose (CHRONULAC) 10 g/15 mL solution   No No   Sig: Take 45 mL (30 g total) by mouth 4 (four) times a day   lidocaine (LIDODERM) 5 %  Pharmacy (Specify) No No   Sig: Apply 1 patch topically over 12 hours daily Remove & Discard patch within 12 hours or as directed by MD cortezrine (PROAMATINE) 2.5 mg tablet   No No   Sig: Take 4 tablets (10 mg total) by mouth 3 (three) times a day before meals   nystatin (MYCOSTATIN) powder   No No   Sig: Apply topically 2 (two) times a day   pantoprazole (PROTONIX) 40 mg tablet   No No   Sig: Take 1 tablet (40 mg total) by mouth daily in the early morning   polyethylene glycol (MIRALAX) 17 g packet  Pharmacy (Specify) Yes No   Sig: Take 17 g by mouth daily as needed   potassium chloride (Klor-Con M20) 20 mEq tablet   No No   Sig: Take  2 tablets (40 mEq total) by mouth daily   rifaximin (XIFAXAN) 550 mg tablet   Yes No   Sig: Take 550 mg by mouth every 12 (twelve) hours   sertraline (ZOLOFT) 100 mg tablet   No No   Sig: Take 1 tablet (100 mg total) by mouth daily   spironolactone (ALDACTONE) 25 mg tablet   No No   Sig: Take 1 tablet (25 mg total) by mouth daily   torsemide (DEMADEX) 20 mg tablet   No No   Sig: Take 1 tablet (20 mg total) by mouth 2 (two) times a day   ursodiol (ACTIGALL) 300 mg capsule   Yes No   Sig: Take 300 mg by mouth 2 (two) times a day      Facility-Administered Medications: None     Discharge Medication List as of 4/21/2025  2:07 PM        START taking these medications    Details   cefuroxime (CEFTIN) 500 mg tablet Take 1 tablet (500 mg total) by mouth every 12 (twelve) hours for 7 days, Starting Mon 4/21/2025, Until Mon 4/28/2025, Normal           CONTINUE these medications which have NOT CHANGED    Details   amitriptyline (ELAVIL) 50 mg tablet Take 1 tablet (50 mg total) by mouth daily at bedtime, Starting Sat 3/1/2025, Normal      atenolol (TENORMIN) 25 mg tablet Take 1 tablet (25 mg total) by mouth daily, Starting u 2/13/2025, Normal      dapsone 25 mg tablet Take 50 mg by mouth daily, Historical Med      folic acid (FOLVITE) 1 mg tablet Take 1 mg by mouth daily, Starting Thu 8/4/2016, Until Fri 2/28/2025, Historical Med      lactulose (CHRONULAC) 10 g/15 mL solution Take 45 mL (30 g total) by mouth 4 (four) times a day, Starting u 2/13/2025, Normal      lidocaine (LIDODERM) 5 % Apply 1 patch topically over 12 hours daily Remove & Discard patch within 12 hours or as directed by MD, Starting Sun 8/4/2024, No Print      midodrine (PROAMATINE) 2.5 mg tablet Take 4 tablets (10 mg total) by mouth 3 (three) times a day before meals, Starting Thu 2/13/2025, Normal      nystatin (MYCOSTATIN) powder Apply topically 2 (two) times a day, Starting Sun 10/6/2024, Until Fri 1/17/2025, Normal      pantoprazole (PROTONIX) 40 mg  tablet Take 1 tablet (40 mg total) by mouth daily in the early morning, Starting Thu 2/13/2025, Normal      polyethylene glycol (MIRALAX) 17 g packet Take 17 g by mouth daily as needed, Historical Med      potassium chloride (Klor-Con M20) 20 mEq tablet Take 2 tablets (40 mEq total) by mouth daily, Starting Thu 2/13/2025, Until Sat 3/15/2025, Normal      rifaximin (XIFAXAN) 550 mg tablet Take 550 mg by mouth every 12 (twelve) hours, Historical Med      sertraline (ZOLOFT) 100 mg tablet Take 1 tablet (100 mg total) by mouth daily, Starting Mon 7/8/2024, Normal      spironolactone (ALDACTONE) 25 mg tablet Take 1 tablet (25 mg total) by mouth daily, Starting Thu 2/13/2025, Until Sat 3/15/2025, Normal      torsemide (DEMADEX) 20 mg tablet Take 1 tablet (20 mg total) by mouth 2 (two) times a day, Starting Thu 2/13/2025, Until Sat 3/15/2025, Normal      ursodiol (ACTIGALL) 300 mg capsule Take 300 mg by mouth 2 (two) times a day, Historical Med           No discharge procedures on file.  ED SEPSIS DOCUMENTATION   Time reflects when diagnosis was documented in both MDM as applicable and the Disposition within this note       Time User Action Codes Description Comment    4/21/2025  2:04 PM Bright Sterling [R53.1] Generalized weakness     4/21/2025  2:04 PM Bright Sterling [W19.XXXA] Fall, initial encounter     4/21/2025  2:04 PM Bright Sterling [N39.0] Acute UTI                  Bright Sterling MD  04/23/25 0701

## 2025-04-23 LAB — BACTERIA UR CULT: NORMAL

## 2025-05-02 ENCOUNTER — HOSPITAL ENCOUNTER (INPATIENT)
Facility: HOSPITAL | Age: 55
LOS: 3 days | Discharge: HOME HEALTH | DRG: 683 | End: 2025-05-06
Attending: STUDENT IN AN ORGANIZED HEALTH CARE EDUCATION/TRAINING PROGRAM | Admitting: FAMILY MEDICINE
Payer: MEDICARE

## 2025-05-02 ENCOUNTER — APPOINTMENT (EMERGENCY)
Dept: CT IMAGING | Facility: HOSPITAL | Age: 55
DRG: 683 | End: 2025-05-02
Payer: MEDICARE

## 2025-05-02 DIAGNOSIS — K74.60 LIVER CIRRHOSIS SECONDARY TO NASH (HCC): ICD-10-CM

## 2025-05-02 DIAGNOSIS — N17.9 ACUTE KIDNEY INJURY (HCC): Primary | ICD-10-CM

## 2025-05-02 DIAGNOSIS — R07.9 RIGHT-SIDED CHEST PAIN: ICD-10-CM

## 2025-05-02 DIAGNOSIS — K75.81 LIVER CIRRHOSIS SECONDARY TO NASH (HCC): ICD-10-CM

## 2025-05-02 DIAGNOSIS — R42 POSTURAL LIGHTHEADEDNESS: ICD-10-CM

## 2025-05-02 PROBLEM — R10.9 ABDOMINAL PAIN: Status: ACTIVE | Noted: 2025-05-02

## 2025-05-02 PROBLEM — R29.6 FALLS: Status: ACTIVE | Noted: 2025-05-02

## 2025-05-02 LAB
2HR DELTA HS TROPONIN: 0 NG/L
4HR DELTA HS TROPONIN: 1 NG/L
ALBUMIN SERPL BCG-MCNC: 3.1 G/DL (ref 3.5–5)
ALP SERPL-CCNC: 191 U/L (ref 34–104)
ALT SERPL W P-5'-P-CCNC: 17 U/L (ref 7–52)
AMMONIA PLAS-SCNC: 92 UMOL/L (ref 18–72)
ANION GAP SERPL CALCULATED.3IONS-SCNC: 6 MMOL/L (ref 4–13)
AST SERPL W P-5'-P-CCNC: 40 U/L (ref 13–39)
ATRIAL RATE: 94 BPM
BASOPHILS # BLD AUTO: 0.04 THOUSANDS/ÂΜL (ref 0–0.1)
BASOPHILS NFR BLD AUTO: 2 % (ref 0–1)
BILIRUB DIRECT SERPL-MCNC: 0.42 MG/DL (ref 0–0.2)
BILIRUB SERPL-MCNC: 1.09 MG/DL (ref 0.2–1)
BILIRUB UR QL STRIP: NEGATIVE
BUN SERPL-MCNC: 28 MG/DL (ref 5–25)
CALCIUM SERPL-MCNC: 8.7 MG/DL (ref 8.4–10.2)
CARDIAC TROPONIN I PNL SERPL HS: 3 NG/L (ref ?–50)
CARDIAC TROPONIN I PNL SERPL HS: 3 NG/L (ref ?–50)
CARDIAC TROPONIN I PNL SERPL HS: 4 NG/L (ref ?–50)
CHLORIDE SERPL-SCNC: 100 MMOL/L (ref 96–108)
CK SERPL-CCNC: 35 U/L (ref 26–192)
CLARITY UR: CLEAR
CO2 SERPL-SCNC: 30 MMOL/L (ref 21–32)
COLOR UR: YELLOW
CREAT SERPL-MCNC: 1.68 MG/DL (ref 0.6–1.3)
EOSINOPHIL # BLD AUTO: 0.06 THOUSAND/ÂΜL (ref 0–0.61)
EOSINOPHIL NFR BLD AUTO: 3 % (ref 0–6)
ERYTHROCYTE [DISTWIDTH] IN BLOOD BY AUTOMATED COUNT: 16.3 % (ref 11.6–15.1)
GFR SERPL CREATININE-BSD FRML MDRD: 33 ML/MIN/1.73SQ M
GLUCOSE SERPL-MCNC: 154 MG/DL (ref 65–140)
GLUCOSE UR STRIP-MCNC: NEGATIVE MG/DL
HCT VFR BLD AUTO: 28.5 % (ref 34.8–46.1)
HGB BLD-MCNC: 8.8 G/DL (ref 11.5–15.4)
HGB UR QL STRIP.AUTO: NEGATIVE
IMM GRANULOCYTES # BLD AUTO: 0.01 THOUSAND/UL (ref 0–0.2)
IMM GRANULOCYTES NFR BLD AUTO: 0 % (ref 0–2)
INR PPP: 1.27 (ref 0.85–1.19)
KETONES UR STRIP-MCNC: NEGATIVE MG/DL
LACTATE SERPL-SCNC: 1.4 MMOL/L (ref 0.5–2)
LEUKOCYTE ESTERASE UR QL STRIP: NEGATIVE
LYMPHOCYTES # BLD AUTO: 0.55 THOUSANDS/ÂΜL (ref 0.6–4.47)
LYMPHOCYTES NFR BLD AUTO: 24 % (ref 14–44)
MAGNESIUM SERPL-MCNC: 2 MG/DL (ref 1.9–2.7)
MCH RBC QN AUTO: 28.1 PG (ref 26.8–34.3)
MCHC RBC AUTO-ENTMCNC: 30.9 G/DL (ref 31.4–37.4)
MCV RBC AUTO: 91 FL (ref 82–98)
MONOCYTES # BLD AUTO: 0.16 THOUSAND/ÂΜL (ref 0.17–1.22)
MONOCYTES NFR BLD AUTO: 7 % (ref 4–12)
NEUTROPHILS # BLD AUTO: 1.46 THOUSANDS/ÂΜL (ref 1.85–7.62)
NEUTS SEG NFR BLD AUTO: 64 % (ref 43–75)
NITRITE UR QL STRIP: NEGATIVE
NRBC BLD AUTO-RTO: 0 /100 WBCS
PH UR STRIP.AUTO: 7 [PH]
PLATELET # BLD AUTO: 69 THOUSANDS/UL (ref 149–390)
PMV BLD AUTO: 10.1 FL (ref 8.9–12.7)
POTASSIUM SERPL-SCNC: 4.4 MMOL/L (ref 3.5–5.3)
PR INTERVAL: 188 MS
PROCALCITONIN SERPL-MCNC: 0.1 NG/ML
PROT SERPL-MCNC: 7.7 G/DL (ref 6.4–8.4)
PROT UR STRIP-MCNC: NEGATIVE MG/DL
PROTHROMBIN TIME: 16.2 SECONDS (ref 12.3–15)
QRS AXIS: 177 DEGREES
QRSD INTERVAL: 96 MS
QT INTERVAL: 366 MS
QTC INTERVAL: 457 MS
RBC # BLD AUTO: 3.13 MILLION/UL (ref 3.81–5.12)
SODIUM SERPL-SCNC: 136 MMOL/L (ref 135–147)
SP GR UR STRIP.AUTO: <=1.005 (ref 1–1.03)
T WAVE AXIS: 144 DEGREES
UROBILINOGEN UR QL STRIP.AUTO: 0.2 E.U./DL
VENTRICULAR RATE: 94 BPM
WBC # BLD AUTO: 2.28 THOUSAND/UL (ref 4.31–10.16)

## 2025-05-02 PROCEDURE — 93005 ELECTROCARDIOGRAM TRACING: CPT

## 2025-05-02 PROCEDURE — 85610 PROTHROMBIN TIME: CPT | Performed by: STUDENT IN AN ORGANIZED HEALTH CARE EDUCATION/TRAINING PROGRAM

## 2025-05-02 PROCEDURE — 99285 EMERGENCY DEPT VISIT HI MDM: CPT

## 2025-05-02 PROCEDURE — 70450 CT HEAD/BRAIN W/O DYE: CPT

## 2025-05-02 PROCEDURE — 80076 HEPATIC FUNCTION PANEL: CPT | Performed by: STUDENT IN AN ORGANIZED HEALTH CARE EDUCATION/TRAINING PROGRAM

## 2025-05-02 PROCEDURE — 84484 ASSAY OF TROPONIN QUANT: CPT | Performed by: STUDENT IN AN ORGANIZED HEALTH CARE EDUCATION/TRAINING PROGRAM

## 2025-05-02 PROCEDURE — 97163 PT EVAL HIGH COMPLEX 45 MIN: CPT

## 2025-05-02 PROCEDURE — 85025 COMPLETE CBC W/AUTO DIFF WBC: CPT | Performed by: STUDENT IN AN ORGANIZED HEALTH CARE EDUCATION/TRAINING PROGRAM

## 2025-05-02 PROCEDURE — 84145 PROCALCITONIN (PCT): CPT | Performed by: FAMILY MEDICINE

## 2025-05-02 PROCEDURE — 83735 ASSAY OF MAGNESIUM: CPT | Performed by: STUDENT IN AN ORGANIZED HEALTH CARE EDUCATION/TRAINING PROGRAM

## 2025-05-02 PROCEDURE — 80048 BASIC METABOLIC PNL TOTAL CA: CPT | Performed by: STUDENT IN AN ORGANIZED HEALTH CARE EDUCATION/TRAINING PROGRAM

## 2025-05-02 PROCEDURE — 99223 1ST HOSP IP/OBS HIGH 75: CPT | Performed by: FAMILY MEDICINE

## 2025-05-02 PROCEDURE — 51798 US URINE CAPACITY MEASURE: CPT

## 2025-05-02 PROCEDURE — 96375 TX/PRO/DX INJ NEW DRUG ADDON: CPT

## 2025-05-02 PROCEDURE — 99285 EMERGENCY DEPT VISIT HI MDM: CPT | Performed by: STUDENT IN AN ORGANIZED HEALTH CARE EDUCATION/TRAINING PROGRAM

## 2025-05-02 PROCEDURE — 82140 ASSAY OF AMMONIA: CPT | Performed by: FAMILY MEDICINE

## 2025-05-02 PROCEDURE — 96365 THER/PROPH/DIAG IV INF INIT: CPT

## 2025-05-02 PROCEDURE — 82550 ASSAY OF CK (CPK): CPT | Performed by: FAMILY MEDICINE

## 2025-05-02 PROCEDURE — 81003 URINALYSIS AUTO W/O SCOPE: CPT | Performed by: STUDENT IN AN ORGANIZED HEALTH CARE EDUCATION/TRAINING PROGRAM

## 2025-05-02 PROCEDURE — 71250 CT THORAX DX C-: CPT

## 2025-05-02 PROCEDURE — 72125 CT NECK SPINE W/O DYE: CPT

## 2025-05-02 PROCEDURE — 36415 COLL VENOUS BLD VENIPUNCTURE: CPT | Performed by: STUDENT IN AN ORGANIZED HEALTH CARE EDUCATION/TRAINING PROGRAM

## 2025-05-02 PROCEDURE — 83605 ASSAY OF LACTIC ACID: CPT | Performed by: STUDENT IN AN ORGANIZED HEALTH CARE EDUCATION/TRAINING PROGRAM

## 2025-05-02 PROCEDURE — 74176 CT ABD & PELVIS W/O CONTRAST: CPT

## 2025-05-02 PROCEDURE — 96366 THER/PROPH/DIAG IV INF ADDON: CPT

## 2025-05-02 RX ORDER — MIDODRINE HYDROCHLORIDE 5 MG/1
10 TABLET ORAL
Status: DISCONTINUED | OUTPATIENT
Start: 2025-05-02 | End: 2025-05-06 | Stop reason: HOSPADM

## 2025-05-02 RX ORDER — OXYCODONE HYDROCHLORIDE 10 MG/1
10 TABLET ORAL EVERY 6 HOURS PRN
Refills: 0 | Status: DISCONTINUED | OUTPATIENT
Start: 2025-05-02 | End: 2025-05-06 | Stop reason: HOSPADM

## 2025-05-02 RX ORDER — PANTOPRAZOLE SODIUM 40 MG/1
40 TABLET, DELAYED RELEASE ORAL
Status: DISCONTINUED | OUTPATIENT
Start: 2025-05-03 | End: 2025-05-06 | Stop reason: HOSPADM

## 2025-05-02 RX ORDER — LACTULOSE 10 G/15ML
20 SOLUTION ORAL
Status: DISCONTINUED | OUTPATIENT
Start: 2025-05-02 | End: 2025-05-03

## 2025-05-02 RX ORDER — ONDANSETRON 2 MG/ML
4 INJECTION INTRAMUSCULAR; INTRAVENOUS ONCE
Status: COMPLETED | OUTPATIENT
Start: 2025-05-02 | End: 2025-05-02

## 2025-05-02 RX ORDER — URSODIOL 300 MG/1
300 CAPSULE ORAL 2 TIMES DAILY
Status: DISCONTINUED | OUTPATIENT
Start: 2025-05-02 | End: 2025-05-06 | Stop reason: HOSPADM

## 2025-05-02 RX ORDER — CARVEDILOL 3.12 MG/1
3.12 TABLET ORAL 2 TIMES DAILY WITH MEALS
Status: DISCONTINUED | OUTPATIENT
Start: 2025-05-02 | End: 2025-05-02

## 2025-05-02 RX ORDER — SODIUM CHLORIDE, SODIUM GLUCONATE, SODIUM ACETATE, POTASSIUM CHLORIDE, MAGNESIUM CHLORIDE, SODIUM PHOSPHATE, DIBASIC, AND POTASSIUM PHOSPHATE .53; .5; .37; .037; .03; .012; .00082 G/100ML; G/100ML; G/100ML; G/100ML; G/100ML; G/100ML; G/100ML
50 INJECTION, SOLUTION INTRAVENOUS CONTINUOUS
Status: DISCONTINUED | OUTPATIENT
Start: 2025-05-02 | End: 2025-05-04

## 2025-05-02 RX ORDER — HYDROMORPHONE HCL/PF 1 MG/ML
1 SYRINGE (ML) INJECTION ONCE
Refills: 0 | Status: COMPLETED | OUTPATIENT
Start: 2025-05-02 | End: 2025-05-02

## 2025-05-02 RX ORDER — CYCLOBENZAPRINE HCL 10 MG
10 TABLET ORAL 3 TIMES DAILY PRN
COMMUNITY

## 2025-05-02 RX ORDER — SERTRALINE HYDROCHLORIDE 100 MG/1
100 TABLET, FILM COATED ORAL DAILY
Status: DISCONTINUED | OUTPATIENT
Start: 2025-05-03 | End: 2025-05-03

## 2025-05-02 RX ORDER — SODIUM CHLORIDE, SODIUM GLUCONATE, SODIUM ACETATE, POTASSIUM CHLORIDE, MAGNESIUM CHLORIDE, SODIUM PHOSPHATE, DIBASIC, AND POTASSIUM PHOSPHATE .53; .5; .37; .037; .03; .012; .00082 G/100ML; G/100ML; G/100ML; G/100ML; G/100ML; G/100ML; G/100ML
1000 INJECTION, SOLUTION INTRAVENOUS ONCE
Status: COMPLETED | OUTPATIENT
Start: 2025-05-02 | End: 2025-05-02

## 2025-05-02 RX ORDER — LIDOCAINE 50 MG/G
1 PATCH TOPICAL DAILY
Status: DISCONTINUED | OUTPATIENT
Start: 2025-05-02 | End: 2025-05-06 | Stop reason: HOSPADM

## 2025-05-02 RX ORDER — CARVEDILOL 3.12 MG/1
3.12 TABLET ORAL 2 TIMES DAILY WITH MEALS
Status: DISCONTINUED | OUTPATIENT
Start: 2025-05-02 | End: 2025-05-03

## 2025-05-02 RX ORDER — CARVEDILOL 3.12 MG/1
3.12 TABLET ORAL 2 TIMES DAILY WITH MEALS
COMMUNITY
End: 2025-05-06

## 2025-05-02 RX ORDER — DAPSONE 25 MG/1
50 TABLET ORAL DAILY
Status: DISCONTINUED | OUTPATIENT
Start: 2025-05-03 | End: 2025-05-06 | Stop reason: HOSPADM

## 2025-05-02 RX ORDER — ATENOLOL 25 MG/1
25 TABLET ORAL DAILY
Status: DISCONTINUED | OUTPATIENT
Start: 2025-05-03 | End: 2025-05-02

## 2025-05-02 RX ADMIN — SODIUM CHLORIDE, SODIUM GLUCONATE, SODIUM ACETATE, POTASSIUM CHLORIDE, MAGNESIUM CHLORIDE, SODIUM PHOSPHATE, DIBASIC, AND POTASSIUM PHOSPHATE 75 ML/HR: .53; .5; .37; .037; .03; .012; .00082 INJECTION, SOLUTION INTRAVENOUS at 14:01

## 2025-05-02 RX ADMIN — RIFAXIMIN 550 MG: 550 TABLET ORAL at 14:01

## 2025-05-02 RX ADMIN — HYDROMORPHONE HYDROCHLORIDE 1 MG: 1 INJECTION, SOLUTION INTRAMUSCULAR; INTRAVENOUS; SUBCUTANEOUS at 10:49

## 2025-05-02 RX ADMIN — ONDANSETRON 4 MG: 2 INJECTION INTRAMUSCULAR; INTRAVENOUS at 08:58

## 2025-05-02 RX ADMIN — MIDODRINE HYDROCHLORIDE 10 MG: 5 TABLET ORAL at 17:01

## 2025-05-02 RX ADMIN — SODIUM CHLORIDE, SODIUM GLUCONATE, SODIUM ACETATE, POTASSIUM CHLORIDE, MAGNESIUM CHLORIDE, SODIUM PHOSPHATE, DIBASIC, AND POTASSIUM PHOSPHATE 1000 ML: .53; .5; .37; .037; .03; .012; .00082 INJECTION, SOLUTION INTRAVENOUS at 10:25

## 2025-05-02 RX ADMIN — LACTULOSE 20 G: 20 SOLUTION ORAL at 19:18

## 2025-05-02 RX ADMIN — LIDOCAINE 5% 1 PATCH: 700 PATCH TOPICAL at 14:01

## 2025-05-02 RX ADMIN — AMITRIPTYLINE HYDROCHLORIDE 50 MG: 25 TABLET, FILM COATED ORAL at 21:33

## 2025-05-02 RX ADMIN — CARVEDILOL 3.12 MG: 3.12 TABLET, FILM COATED ORAL at 17:01

## 2025-05-02 RX ADMIN — OXYCODONE HYDROCHLORIDE 10 MG: 10 TABLET ORAL at 19:18

## 2025-05-02 RX ADMIN — LACTULOSE 20 G: 20 SOLUTION ORAL at 21:33

## 2025-05-02 RX ADMIN — LACTULOSE 20 G: 20 SOLUTION ORAL at 17:01

## 2025-05-02 RX ADMIN — URSODIOL 300 MG: 300 CAPSULE ORAL at 17:01

## 2025-05-02 RX ADMIN — LACTULOSE 20 G: 20 SOLUTION ORAL at 14:01

## 2025-05-02 NOTE — H&P
H&P - Hospitalist   Name: Bailey Olsen 54 y.o. female I MRN: [id number]  Unit/Bed#: -01 I Date of Admission: 5/2/2025   Date of Service: 5/2/2025 I Hospital Day: 0     Assessment & Plan  Falls  EKG nonischemic troponin is negative CT head is negative for acute abnormality CT cervical is negative for acute abnormality  Suspect could be postural orthostatic  Will hydrate and hold all diuretics at this time  Do orthostatics  PT OT  Apparently patient is on coreg and not atenolol from chart PCP review and also on lisinopril for htn and questionable if she was taking midodrine- I restarted midodrine hold lisinopril as has kishore and also will need to check orthostatics as suspect her bp falls and she falls.will continue low dose coreg for now pending orthos    Liver cirrhosis secondary to HYATT (nonalcoholic steatohepatitis) (HCC)  With hyperammonemia but there is no hepatic encephalopathy-tremors are not known is 89 I really question if she is taking her lactulose will start lactulose every 2 hours still 3-4 bowel movements achieved then we will hold  Hold diuretics secondary to KISHORE and she looks and dehydrated side  Continue Xifaxan  Pancytopenia (HCC)  Secondary to liver cirrhosis with platelets being less than 70,000 we will hold heparin hemoglobin is stable is also has associated iron deficiency anemia is greater than 8 pancytopenia secondary to liver cirrhosis  Chronic, continuous use of opioids  She is on oxycodone 10 every 6 hours as needed secondary to chronic low back pain  Chest pain  For 3 days denies falling forward tender on palpation also on breathing suspected secondary to the healing and now rib fracture she also had some opacities in both lungs which are now improving could be pneumonitis also could be costochondritis nonischemic EKG troponin is negative will proceed with a heating pad and Lidoderm patch  Abdominal pain  Right side could be radiation down from the infiltration pneumonitis could be  muscular skeletal as there is a little bruise on the right side she is postcholecystectomy no obvious abnormality on CT abdomen and pelvis could be radiation down from broken ribs will place with a Lidoderm patch.  And a heating pad  Acute kidney injury (HCC)  Creatinine is 1.6 normal is 0.9-1 also BUN is elevated she is looks on the dry side will hydrate hold diuretics will also check a CK UA and bladder scan    VTE Pharmacologic Prophylaxis:   Moderate Risk (Score 3-4) - Pharmacological DVT Prophylaxis Contraindicated. Sequential Compression Devices Ordered.  Code Status: Full code  Discussion with family: Patient  Anticipated Length of Stay: Patient will be admitted on an observation basis with an anticipated length of stay of less than 2 midnights secondary to falls dizziness right-sided chest pain right upper abdominal pain.     History of Present Illness  Chief Complaint: Falls dizziness     Bailey Olsen is a 54 y.o. female with a PMH of liver cirrhosis who presents with 3 days of right-sided pain especially with taking a deep breath and also moving certain positions right upper quadrant pain the same amount of time also for the past 2 weeks she has been falling she has been feeling lightheaded , also has been having tremors falling asleep states she had 3 bowel movements yesterday none today and she has been taking her lactulose.  Has been eating and drinking states when she does stand up and walks around she does feel lightheaded.  No cough no shortness of breath     Review of Systems   Constitutional:  Negative for chills and fever.   HENT:  Negative for ear pain and sore throat.    Eyes:  Negative for pain and visual disturbance.   Respiratory:  Negative for cough and shortness of breath.    Cardiovascular:  Positive for chest pain. Negative for palpitations.   Gastrointestinal:  Positive for abdominal pain. Negative for vomiting.   Genitourinary:  Negative for dysuria and hematuria.   Musculoskeletal:   Negative for arthralgias and back pain.   Skin:  Negative for color change and rash.   Neurological:  Positive for dizziness. Negative for seizures and syncope.   All other systems reviewed and are negative.        Historical Information  Medical History        Past Medical History:   Diagnosis Date    Back pain      Carcinoid tumor       neuroendocrine    CHF (congestive heart failure) (HCC)      Cirrhosis of liver (HCC)      Hypertension      Sarcoidosis           Surgical History         Past Surgical History:   Procedure Laterality Date    CHOLECYSTECTOMY        GASTRIC BYPASS        HERNIA REPAIR        IR KYPHOPLASTY/VERTEBROPLASTY   10/15/2024         Social History              Tobacco Use    Smoking status: Never    Smokeless tobacco: Never   Vaping Use    Vaping status: Never Used   Substance and Sexual Activity    Alcohol use: Never    Drug use: Never    Sexual activity: Not Currently               E-Cigarette/Vaping    E-Cigarette Use Never User              E-Cigarette/Vaping Substances    Nicotine No      THC No      CBD No      Flavoring No      Other No      Unknown No        Family History         Family History   Problem Relation Age of Onset    Hypertension Father      Heart attack Father      No Known Problems Sister      No Known Problems Brother      No Known Problems Brother      No Known Problems Brother      No Known Problems Brother      No Known Problems Brother      No Known Problems Daughter           Social History:  Marital Status: /Civil Union      Meds/Allergies  I have reviewed home medications using recent Epic encounter.          Prior to Admission medications    Medication Sig Start Date End Date Taking? Authorizing Provider   amitriptyline (ELAVIL) 50 mg tablet Take 1 tablet (50 mg total) by mouth daily at bedtime 3/1/25     Mar Oliva MD   atenolol (TENORMIN) 25 mg tablet Take 1 tablet (25 mg total) by mouth daily 2/13/25     Idalia Stuart MD   dapsone 25 mg tablet  Take 50 mg by mouth daily       Historical Provider, MD   folic acid (FOLVITE) 1 mg tablet Take 1 mg by mouth daily 8/4/16 2/28/25   Historical Provider, MD   lactulose (CHRONULAC) 10 g/15 mL solution Take 45 mL (30 g total) by mouth 4 (four) times a day 2/13/25     Idalia Stuart MD   lidocaine (LIDODERM) 5 % Apply 1 patch topically over 12 hours daily Remove & Discard patch within 12 hours or as directed by MD 8/4/24     Ruslan Keyes MD   midodrine (PROAMATINE) 2.5 mg tablet Take 4 tablets (10 mg total) by mouth 3 (three) times a day before meals 2/13/25     Idalia Stuart MD   nystatin (MYCOSTATIN) powder Apply topically 2 (two) times a day 10/6/24 1/17/25   Idalia Stuart MD   pantoprazole (PROTONIX) 40 mg tablet Take 1 tablet (40 mg total) by mouth daily in the early morning 2/13/25     Idalia Stuart MD   polyethylene glycol (MIRALAX) 17 g packet Take 17 g by mouth daily as needed       Historical Provider, MD   potassium chloride (Klor-Con M20) 20 mEq tablet Take 2 tablets (40 mEq total) by mouth daily 2/13/25 3/15/25   Idalia Stuart MD   rifaximin (XIFAXAN) 550 mg tablet Take 550 mg by mouth every 12 (twelve) hours       Historical Provider, MD   sertraline (ZOLOFT) 100 mg tablet Take 1 tablet (100 mg total) by mouth daily 7/8/24     Mar Oliva MD   spironolactone (ALDACTONE) 25 mg tablet Take 1 tablet (25 mg total) by mouth daily 2/13/25 3/15/25   Idalia Stuart MD   torsemide (DEMADEX) 20 mg tablet Take 1 tablet (20 mg total) by mouth 2 (two) times a day 2/13/25 3/15/25   Idalia Stuart MD   ursodiol (ACTIGALL) 300 mg capsule Take 300 mg by mouth 2 (two) times a day       Historical Provider, MD      Allergies         Allergies   Allergen Reactions    Zolpidem GI Intolerance, Other (See Comments) and Hives       Other Reaction(s): Other      Blacks out     Blacks out    Tapentadol Sneezing and Other (See Comments)       Zoned out    Alprazolam Other (See Comments)       Other Reaction(s): Other      Feels  zoned out     Feels zoned out    Amoxicillin-Pot Clavulanate Hives and Nausea Only       Amoxil fine    Dust Mite Extract Sneezing    Morphine Headache, Nausea Only and Vomiting    Tramadol GI Intolerance    Azelastine Rash    Azelastine Hcl Rash    Molds & Smuts Rash       Other Reaction(s): Unknown            Objective  :  Temp:  [98 °F (36.7 °C)] 98 °F (36.7 °C)  HR:  [90-97] 90  BP: (105-122)/(55-83) 122/60  Resp:  [17-25] 17  SpO2:  [91 %-98 %] 91 %  O2 Device: None (Room air)     Physical Exam  Vitals and nursing note reviewed.   Constitutional:       General: She is not in acute distress.     Appearance: She is well-developed.   HENT:      Head: Normocephalic and atraumatic.   Eyes:      Conjunctiva/sclera: Conjunctivae normal.   Cardiovascular:      Rate and Rhythm: Normal rate and regular rhythm.      Heart sounds: No murmur heard.  Pulmonary:      Effort: Pulmonary effort is normal. No respiratory distress.      Breath sounds: Normal breath sounds. No wheezing or rales.   Chest:      Chest wall: Tenderness (Right-sided and palpation) present.   Abdominal:      General: There is no distension.      Palpations: Abdomen is soft.      Tenderness: There is abdominal tenderness (Right upper right side).   Musculoskeletal:         General: No swelling.      Cervical back: Neck supple.   Skin:     General: Skin is warm and dry.      Capillary Refill: Capillary refill takes less than 2 seconds.   Neurological:      Mental Status: She is alert and oriented to person, place, and time.      Comments: Tremors of hands   Psychiatric:         Mood and Affect: Mood normal.            Lines/Drains:                 Lab Results: I have reviewed the following results:       Results from last 7 days   Lab Units 05/02/25  0854   WBC Thousand/uL 2.28*   HEMOGLOBIN g/dL 8.8*   HEMATOCRIT % 28.5*   PLATELETS Thousands/uL 69*   SEGS PCT % 64   LYMPHO PCT % 24   MONO PCT % 7   EOS PCT % 3           Results from last 7 days   Lab  Units 05/02/25  0854   SODIUM mmol/L 136   POTASSIUM mmol/L 4.4   CHLORIDE mmol/L 100   CO2 mmol/L 30   BUN mg/dL 28*   CREATININE mg/dL 1.68*   ANION GAP mmol/L 6   CALCIUM mg/dL 8.7   ALBUMIN g/dL 3.1*   TOTAL BILIRUBIN mg/dL 1.09*   ALK PHOS U/L 191*   ALT U/L 17   AST U/L 40*   GLUCOSE RANDOM mg/dL 154*           Results from last 7 days   Lab Units 05/02/25  0854   INR   1.27*                Lab Results   Component Value Date     HGBA1C 4.7 02/26/2025     HGBA1C 5.9 (H) 02/27/2024     HGBA1C 5.9 08/03/2023            Results from last 7 days   Lab Units 05/02/25  1122 05/02/25  0854   LACTIC ACID mmol/L  --  1.4   PROCALCITONIN ng/ml 0.10  --          Imaging Results Review: I reviewed radiology reports from this admission including: CT chest and CT abdomen/pelvis.  Other Study Results Review: EKG was reviewed.      Administrative Statements  I have spent a total time of >45 minutes in caring for this patient on the day of the visit/encounter including Documenting in the medical record, Reviewing/placing orders in the medical record (including tests, medications, and/or procedures), Obtaining or reviewing history  , and Communicating with other healthcare professionals .     ** Please Note: This note has been constructed using a voice recognition system. **

## 2025-05-02 NOTE — ASSESSMENT & PLAN NOTE
With hyperammonemia but there is no hepatic encephalopathy-tremors are not known is 89 I really question if she is taking her lactulose will start lactulose every 2 hours still 3-4 bowel movements achieved then we will hold  Hold diuretics secondary to KISHORE and she looks and dehydrated side  Continue Xifaxan

## 2025-05-02 NOTE — ASSESSMENT & PLAN NOTE
EKG nonischemic troponin is negative CT head is negative for acute abnormality CT cervical is negative for acute abnormality  Suspect could be postural orthostatic  Will hydrate and hold all diuretics at this time  Do orthostatics  PT OT

## 2025-05-02 NOTE — ED PROVIDER NOTES
Time reflects when diagnosis was documented in both MDM as applicable and the Disposition within this note       Time User Action Codes Description Comment    5/2/2025 11:55 AM Cheng Ivy [N17.9] Acute kidney injury (HCC)     5/2/2025 11:55 AM Cheng Ivy [R42] Postural lightheadedness     5/2/2025 11:55 AM Cheng Ivy [K75.81,  K74.60] Liver cirrhosis secondary to HYATT (HCC)     5/2/2025 11:56 AM Cheng Ivy [R07.9] Right-sided chest pain           ED Disposition       ED Disposition   Admit    Condition   Stable    Date/Time   Fri May 2, 2025 11:55 AM    Comment   Case was discussed with Dr. Whatley and the patient's admission status was agreed to be Admission Status: observation status to the service of Dr. Whatley .               Assessment & Plan       Medical Decision Making  54 F.  Presents with right-sided chest pain, lightheadedness.  Workup significant for acute kidney injury.  Patient states that she has been having episodes of lightheadedness with changes in posture.  IV fluids administered. Component of orthostatic/postural hypotension.  Likely prerenal source.  PVR 9 mL.  No signs of ureteral calculus or other signs of postobstructive uropathy.  Low suspicion for cardiac arrhythmia, tachyarrhythmia causing symptoms.  ACS unlikely.  Patient is awake/alert/oriented upon arrival.  No focal deficits.  CVA, hepatic encephalopathy unlikely.  CT imaging without acute findings--acute rib fractures, hemo-/pneumothorax unlikely.  Admitted to hospital for further management.     Problems Addressed:  Acute kidney injury (HCC): acute illness or injury  Liver cirrhosis secondary to HYATT (HCC): chronic illness or injury with exacerbation, progression, or side effects of treatment  Postural lightheadedness: acute illness or injury  Right-sided chest pain: acute illness or injury    Amount and/or Complexity of Data Reviewed  External Data Reviewed: labs, radiology and notes.      Details: ED documentation, lab/imaging dated 4/25/2025 reviewed.  Labs: ordered.  Radiology: ordered and independent interpretation performed.     Details: CT head interpreted by me.  No signs of acute hemorrhage/mass effect/midline shift.  CT chest interpreted by me.  No signs of hemo-/pneumothorax.  ECG/medicine tests: ordered and independent interpretation performed. Decision-making details documented in ED Course.     Details: ECG interpreted by me.  Normal sinus rhythm.  Heart rate 94 bpm.  Rightward axis.  No acute ischemic changes.  No signs of unstable arrhythmia.  Right axis deviation has replaced normal axis since April 2025.  Discussion of management or test interpretation with external provider(s): The case and treatment plan were discussed with hospital medicine.     Risk  Prescription drug management.  Parenteral controlled substances.  Decision regarding hospitalization.      Medications   ondansetron (ZOFRAN) injection 4 mg (4 mg Intravenous Given 5/2/25 0858)   multi-electrolyte (Plasmalyte-A/Isolyte-S PH 7.4/Normosol-R) IV bolus 1,000 mL (1,000 mL Intravenous New Bag 5/2/25 1025)   HYDROmorphone (DILAUDID) injection 1 mg (1 mg Intravenous Given 5/2/25 1049)     ED Risk Strat Scores      No data recorded    SBIRT 20yo+      Flowsheet Row Most Recent Value   Initial Alcohol Screen: US AUDIT-C     1. How often do you have a drink containing alcohol? 0 Filed at: 05/02/2025 0828   2. How many drinks containing alcohol do you have on a typical day you are drinking?  0 Filed at: 05/02/2025 0828   3b. FEMALE Any Age, or MALE 65+: How often do you have 4 or more drinks on one occassion? 0 Filed at: 05/02/2025 0828   Audit-C Score 0 Filed at: 05/02/2025 0828   BERNIE: How many times in the past year have you...    Used an illegal drug or used a prescription medication for non-medical reasons? Never Filed at: 05/02/2025 0828          History of Present Illness       Chief Complaint   Patient presents with     Pain With Breathing     Patient reports R sided chest pain that is worse with breathing. States she has had multiple falls and has broken ribs on R side. Patient states that she has had bloating, nausea, tremors, and pain is radiating down to R side of abdomen.       Past Medical History:   Diagnosis Date    Back pain     Carcinoid tumor     neuroendocrine    CHF (congestive heart failure) (HCC)     Cirrhosis of liver (HCC)     Hypertension     Sarcoidosis       Past Surgical History:   Procedure Laterality Date    CHOLECYSTECTOMY      GASTRIC BYPASS      HERNIA REPAIR      IR KYPHOPLASTY/VERTEBROPLASTY  10/15/2024      Family History   Problem Relation Age of Onset    Hypertension Father     Heart attack Father     No Known Problems Sister     No Known Problems Brother     No Known Problems Brother     No Known Problems Brother     No Known Problems Brother     No Known Problems Brother     No Known Problems Daughter       Social History     Tobacco Use    Smoking status: Never    Smokeless tobacco: Never   Vaping Use    Vaping status: Never Used   Substance Use Topics    Alcohol use: Never    Drug use: Never      E-Cigarette/Vaping    E-Cigarette Use Never User       E-Cigarette/Vaping Substances    Nicotine No     THC No     CBD No     Flavoring No     Other No     Unknown No       I have reviewed and agree with the history as documented.     54 F. Hx of liver cirrhosis 2/2 HYATT, iron deficiency anemia. Presents with worsening right sided chest/abdominal pain. She states that she sustained a fall a few weeks ago. Was evaluated in the ED and imaging was obtained--healing right sided rib fractures noted. The patient states that she fell x 3 days ago and injured her right chest. The patient denies shortness of breath.  Has been taking prescribed oxycodone.  Lives with her /daughter.  Typically requires a walker when ambulating.      History provided by:  Patient and medical records  Fall  Associated symptoms:  abdominal pain, chest pain and nausea    Associated symptoms: no back pain, no blindness, no difficulty breathing, no headaches, no loss of consciousness, no neck pain and no vomiting      Review of Systems   Constitutional:  Negative for activity change, appetite change, fatigue and fever.   Eyes:  Negative for blindness.   Respiratory:  Negative for cough, chest tightness, shortness of breath and wheezing.    Cardiovascular:  Positive for chest pain. Negative for palpitations and leg swelling.   Gastrointestinal:  Positive for abdominal pain and nausea. Negative for vomiting.   Genitourinary:  Negative for decreased urine volume, dysuria, flank pain, frequency, pelvic pain and urgency.   Musculoskeletal:  Positive for gait problem. Negative for arthralgias, back pain, myalgias, neck pain and neck stiffness.   Skin:  Positive for color change and wound. Negative for pallor and rash.   Neurological:  Negative for dizziness, loss of consciousness, syncope, weakness, light-headedness and headaches.   Hematological:  Bruises/bleeds easily.     Objective       ED Triage Vitals   Temperature Pulse Blood Pressure Respirations SpO2 Patient Position - Orthostatic VS   05/02/25 0832 05/02/25 0828 05/02/25 0828 05/02/25 0828 05/02/25 0828 05/02/25 0828   98 °F (36.7 °C) 97 120/58 18 96 % Lying      Temp Source Heart Rate Source BP Location FiO2 (%) Pain Score    05/02/25 0832 05/02/25 0828 05/02/25 0828 -- 05/02/25 0828    Temporal Monitor Left arm  7      Vitals      Date and Time Temp Pulse SpO2 Resp BP Pain Score FACES Pain Rating User   05/02/25 1325 97.3 °F (36.3 °C) -- -- -- -- -- -- ND   05/02/25 1325 -- 93 94 % 18 109/59 -- -- DII   05/02/25 1321 -- -- -- -- -- 4 -- ND   05/02/25 1230 -- 90 91 % 17 122/60 -- -- MD   05/02/25 1049 -- -- -- -- -- 8 -- BF   05/02/25 1045 -- 93 98 % 19 109/55 -- -- BF   05/02/25 1030 -- 97 96 % 18 105/83 -- -- BF   05/02/25 0832 98 °F (36.7 °C) -- -- -- -- -- -- AS   05/02/25 0832 -- 92  97 % 25 120/58 -- -- BF   05/02/25 0828 -- 97 96 % 18 120/58 7 -- SL            Physical Exam  Vitals and nursing note reviewed.   Constitutional:       General: She is not in acute distress.     Appearance: She is ill-appearing. She is not toxic-appearing.   HENT:      Head: Normocephalic and atraumatic.      Right Ear: External ear normal.      Left Ear: External ear normal.      Nose: No congestion or rhinorrhea.   Eyes:      General: No scleral icterus.        Right eye: No discharge.         Left eye: No discharge.      Extraocular Movements: Extraocular movements intact.      Conjunctiva/sclera: Conjunctivae normal.   Cardiovascular:      Rate and Rhythm: Normal rate and regular rhythm.      Pulses: Normal pulses.      Heart sounds: Normal heart sounds. No murmur heard.  Pulmonary:      Effort: Pulmonary effort is normal. No respiratory distress.      Breath sounds: Normal breath sounds. No stridor. No wheezing, rhonchi or rales.      Comments: Tenderness to palpation along the right anterior, lateral chest.  No crepitus.  Patient has bilateral breath sounds.  Chest:      Chest wall: Tenderness present.   Abdominal:      General: Bowel sounds are normal. There is no distension.      Palpations: Abdomen is soft.      Tenderness: There is abdominal tenderness. There is no right CVA tenderness, left CVA tenderness, guarding or rebound.      Comments: Tenderness to palpation along the right upper quadrant.   Musculoskeletal:         General: Tenderness and signs of injury present. No swelling or deformity.      Cervical back: Neck supple. No tenderness.      Right lower leg: No edema.      Left lower leg: No edema.   Skin:     General: Skin is warm and dry.      Coloration: Skin is not jaundiced or pale.      Findings: Bruising present.      Comments: Multiple areas of bruising notably on the right upper extremity, right hip.   Neurological:      General: No focal deficit present.      Mental Status: She is alert  and oriented to person, place, and time.   Psychiatric:         Mood and Affect: Mood normal.         Behavior: Behavior normal.       Results Reviewed       Procedure Component Value Units Date/Time    CK [[id number]]  (Normal) Collected: 05/02/25 1227    Lab Status: Final result Specimen: Blood from Arm, Right Updated: 05/02/25 1355     Total CK 35 U/L     HS Troponin I 4hr [[id number]]  (Normal) Collected: 05/02/25 1227    Lab Status: Final result Specimen: Blood from Arm, Right Updated: 05/02/25 1303     hs TnI 4hr 4 ng/L      Delta 4hr hsTnI 1 ng/L     Ammonia [[id number]]  (Abnormal) Collected: 05/02/25 1227    Lab Status: Final result Specimen: Blood from Arm, Right Updated: 05/02/25 1255     Ammonia 92 umol/L     Procalcitonin [[id number]]  (Normal) Collected: 05/02/25 1122    Lab Status: Final result Specimen: Blood from Arm, Right Updated: 05/02/25 1247     Procalcitonin 0.10 ng/ml     HS Troponin I 2hr [[id number]]  (Normal) Collected: 05/02/25 1122    Lab Status: Final result Specimen: Blood from Arm, Right Updated: 05/02/25 1155     hs TnI 2hr 3 ng/L      Delta 2hr hsTnI 0 ng/L     UA w Reflex to Microscopic w Reflex to Culture [[id number]] Collected: 05/02/25 1146    Lab Status: Final result Specimen: Urine, Clean Catch Updated: 05/02/25 1154     Color, UA Yellow     Clarity, UA Clear     Specific Gravity, UA <=1.005     pH, UA 7.0     Leukocytes, UA Negative     Nitrite, UA Negative     Protein, UA Negative mg/dl      Glucose, UA Negative mg/dl      Ketones, UA Negative mg/dl      Urobilinogen, UA 0.2 E.U./dl      Bilirubin, UA Negative     Occult Blood, UA Negative    Protime-INR [[id number]]  (Abnormal) Collected: 05/02/25 0854    Lab Status: Final result Specimen: Blood from Arm, Right Updated: 05/02/25 0929     Protime 16.2 seconds      INR 1.27    Narrative:      INR Therapeutic Range    Indication                                             INR Range      Atrial Fibrillation                                                2.0-3.0  Hypercoagulable State                                    2.0.2.3  Left Ventricular Asist Device                            2.0-3.0  Mechanical Heart Valve                                  -    Aortic(with afib, MI, embolism, HF, LA enlargement,    and/or coagulopathy)                                     2.0-3.0 (2.5-3.5)     Mitral                                                             2.5-3.5  Prosthetic/Bioprosthetic Heart Valve               2.0-3.0  Venous thromboembolism (VTE: VT, PE        2.0-3.0    HS Troponin 0hr (reflex protocol) [[id number]]  (Normal) Collected: 05/02/25 0854    Lab Status: Final result Specimen: Blood from Arm, Right Updated: 05/02/25 0925     hs TnI 0hr 3 ng/L     Lactic acid, plasma (w/reflex if result > 2.0) [[id number]]  (Normal) Collected: 05/02/25 0854    Lab Status: Final result Specimen: Blood from Arm, Right Updated: 05/02/25 0918     LACTIC ACID 1.4 mmol/L     Narrative:      Result may be elevated if tourniquet was used during collection.    Basic metabolic panel [[id number]]  (Abnormal) Collected: 05/02/25 0854    Lab Status: Final result Specimen: Blood from Arm, Right Updated: 05/02/25 0918     Sodium 136 mmol/L      Potassium 4.4 mmol/L      Chloride 100 mmol/L      CO2 30 mmol/L      ANION GAP 6 mmol/L      BUN 28 mg/dL      Creatinine 1.68 mg/dL      Glucose 154 mg/dL      Calcium 8.7 mg/dL      eGFR 33 ml/min/1.73sq m     Narrative:      [employer] Kid[employer] guidelines for Chronic Kidney Disease (CKD):     Stage 1 with normal or high GFR (GFR > 90 mL/min/1.73 square meters)    Stage 2 Mild CKD (GFR = 60-89 mL/min/1.73 square meters)    Stage 3A Moderate CKD (GFR = 45-59 mL/min/1.73 square meters)    Stage 3B Moderate CKD (GFR = 30-44 mL/min/1.73 square meters)    Stage 4 Severe CKD (GFR = 15-29 mL/min/1.73 square meters)    Stage 5 End Stage CKD (GFR <15 mL/min/1.73 square meters)  Note: GFR calculation is accurate only with a  steady state creatinine    Hepatic function panel [[id number]]  (Abnormal) Collected: 05/02/25 0854    Lab Status: Final result Specimen: Blood from Arm, Right Updated: 05/02/25 0918     Total Bilirubin 1.09 mg/dL      Bilirubin, Direct 0.42 mg/dL      Alkaline Phosphatase 191 U/L      AST 40 U/L      ALT 17 U/L      Total Protein 7.7 g/dL      Albumin 3.1 g/dL     Magnesium [[id number]]  (Normal) Collected: 05/02/25 0854    Lab Status: Final result Specimen: Blood from Arm, Right Updated: 05/02/25 0918     Magnesium 2.0 mg/dL     CBC and differential [[id number]]  (Abnormal) Collected: 05/02/25 0854    Lab Status: Final result Specimen: Blood from Arm, Right Updated: 05/02/25 0908     WBC 2.28 Thousand/uL      RBC 3.13 Million/uL      Hemoglobin 8.8 g/dL      Hematocrit 28.5 %      MCV 91 fL      MCH 28.1 pg      MCHC 30.9 g/dL      RDW 16.3 %      MPV 10.1 fL      Platelets 69 Thousands/uL      nRBC 0 /100 WBCs      Segmented % 64 %      Immature Grans % 0 %      Lymphocytes % 24 %      Monocytes % 7 %      Eosinophils Relative 3 %      Basophils Relative 2 %      Absolute Neutrophils 1.46 Thousands/µL      Absolute Immature Grans 0.01 Thousand/uL      Absolute Lymphocytes 0.55 Thousands/µL      Absolute Monocytes 0.16 Thousand/µL      Eosinophils Absolute 0.06 Thousand/µL      Basophils Absolute 0.04 Thousands/µL             CT chest abdomen pelvis wo contrast   Final Interpretation by Laura Westbrook MD (05/02 1053)      No acute abnormality. Healing/subacute right anterior tenth rib fracture at the costochondral junction, unchanged from recent prior CT although new from 2/27/2025.      Patchy groundglass opacities in bilateral anterior upper lobes appears improved in comparison to prior CT dated 4/21/2025, likely infectious/inflammatory.      Cirrhotic liver morphology with stigmata of portal hypertension including splenomegaly and small ascites.      The study was marked in EPIC for immediate notification.          Workstation performed: [id number]         CT head without contrast   Final Interpretation by Triston Zacarias MD (05/02 1047)      No acute intracranial abnormality.                  Resident: SOLIS Kenney I, the attending radiologist, have reviewed the images and agree with the final report above.      Workstation performed: [id number]         CT spine cervical without contrast   Final Interpretation by Triston Zacarias MD (05/02 1049)      No cervical spine fracture or traumatic malalignment.                  Resident: SOLIS Kenney I, the attending radiologist, have reviewed the images and agree with the final report above.      Workstation performed: [id number]             Procedures    ED Medication and Procedure Management   Prior to Admission Medications   Prescriptions Last Dose Informant Patient Reported? Taking?   amitriptyline (ELAVIL) 50 mg tablet   No No   Sig: Take 1 tablet (50 mg total) by mouth daily at bedtime   atenolol (TENORMIN) 25 mg tablet   No No   Sig: Take 1 tablet (25 mg total) by mouth daily   dapsone 25 mg tablet   Yes No   Sig: Take 50 mg by mouth daily   folic acid (FOLVITE) 1 mg tablet   Yes No   Sig: Take 1 mg by mouth daily   lactulose (CHRONULAC) 10 g/15 mL solution   No No   Sig: Take 45 mL (30 g total) by mouth 4 (four) times a day   lidocaine (LIDODERM) 5 %  Pharmacy (Specify) No No   Sig: Apply 1 patch topically over 12 hours daily Remove & Discard patch within 12 hours or as directed by MD   midodrine (PROAMATINE) 2.5 mg tablet   No No   Sig: Take 4 tablets (10 mg total) by mouth 3 (three) times a day before meals   nystatin (MYCOSTATIN) powder   No No   Sig: Apply topically 2 (two) times a day   pantoprazole (PROTONIX) 40 mg tablet   No No   Sig: Take 1 tablet (40 mg total) by mouth daily in the early morning   polyethylene glycol (MIRALAX) 17 g packet  Pharmacy (Specify) Yes No   Sig: Take 17 g by mouth daily as needed   potassium chloride (Klor-Con M20) 20 mEq  tablet   No No   Sig: Take 2 tablets (40 mEq total) by mouth daily   rifaximin (XIFAXAN) 550 mg tablet   Yes No   Sig: Take 550 mg by mouth every 12 (twelve) hours   sertraline (ZOLOFT) 100 mg tablet   No No   Sig: Take 1 tablet (100 mg total) by mouth daily   spironolactone (ALDACTONE) 25 mg tablet   No No   Sig: Take 1 tablet (25 mg total) by mouth daily   torsemide (DEMADEX) 20 mg tablet   No No   Sig: Take 1 tablet (20 mg total) by mouth 2 (two) times a day   ursodiol (ACTIGALL) 300 mg capsule   Yes No   Sig: Take 300 mg by mouth 2 (two) times a day      Facility-Administered Medications: None     Current Discharge Medication List        CONTINUE these medications which have NOT CHANGED    Details   amitriptyline (ELAVIL) 50 mg tablet Take 1 tablet (50 mg total) by mouth daily at bedtime  Qty: 30 tablet, Refills: 0    Associated Diagnoses: Hepatic encephalopathy (HCC)      atenolol (TENORMIN) 25 mg tablet Take 1 tablet (25 mg total) by mouth daily  Qty: 30 tablet, Refills: 0    Associated Diagnoses: Hepatic encephalopathy (HCC)      dapsone 25 mg tablet Take 50 mg by mouth daily      folic acid (FOLVITE) 1 mg tablet Take 1 mg by mouth daily      lactulose (CHRONULAC) 10 g/15 mL solution Take 45 mL (30 g total) by mouth 4 (four) times a day  Qty: 240 mL, Refills: 0    Associated Diagnoses: Hepatic encephalopathy (HCC)      lidocaine (LIDODERM) 5 % Apply 1 patch topically over 12 hours daily Remove & Discard patch within 12 hours or as directed by MD  Qty: 9 patch, Refills: 0    Associated Diagnoses: Closed compression fracture of body of L1 vertebra (HCC); Sacral fracture (HCC)      midodrine (PROAMATINE) 2.5 mg tablet Take 4 tablets (10 mg total) by mouth 3 (three) times a day before meals  Qty: 360 tablet, Refills: 0    Associated Diagnoses: Hepatic encephalopathy (HCC)      nystatin (MYCOSTATIN) powder Apply topically 2 (two) times a day  Qty: 30 g, Refills: 0    Associated Diagnoses: Closed compression  fracture of L1 lumbar vertebra, sequela      pantoprazole (PROTONIX) 40 mg tablet Take 1 tablet (40 mg total) by mouth daily in the early morning  Qty: 30 tablet, Refills: 0    Associated Diagnoses: Hepatic encephalopathy (HCC)      polyethylene glycol (MIRALAX) 17 g packet Take 17 g by mouth daily as needed      potassium chloride (Klor-Con M20) 20 mEq tablet Take 2 tablets (40 mEq total) by mouth daily  Qty: 60 tablet, Refills: 0    Associated Diagnoses: Hepatic encephalopathy (HCC)      rifaximin (XIFAXAN) 550 mg tablet Take 550 mg by mouth every 12 (twelve) hours      sertraline (ZOLOFT) 100 mg tablet Take 1 tablet (100 mg total) by mouth daily  Qty: 30 tablet, Refills: 0    Associated Diagnoses: Sarcoidosis      spironolactone (ALDACTONE) 25 mg tablet Take 1 tablet (25 mg total) by mouth daily  Qty: 30 tablet, Refills: 0    Associated Diagnoses: CHF (congestive heart failure) (HCC)      torsemide (DEMADEX) 20 mg tablet Take 1 tablet (20 mg total) by mouth 2 (two) times a day  Qty: 60 tablet, Refills: 0    Associated Diagnoses: CHF (congestive heart failure) (HCC)      ursodiol (ACTIGALL) 300 mg capsule Take 300 mg by mouth 2 (two) times a day           No discharge procedures on file.  ED SEPSIS DOCUMENTATION   Time reflects when diagnosis was documented in both MDM as applicable and the Disposition within this note       Time User Action Codes Description Comment    5/2/2025 11:55 AM Cheng Ivy [N17.9] Acute kidney injury (HCC)     5/2/2025 11:55 AM Cheng Ivy [R42] Postural lightheadedness     5/2/2025 11:55 AM Cheng Ivy [K75.81,  K74.60] Liver cirrhosis secondary to HYATT (HCC)     5/2/2025 11:56 AM Cheng Ivy [R07.9] Right-sided chest pain                  Cheng Ivy, DO  05/02/25 1410

## 2025-05-02 NOTE — ASSESSMENT & PLAN NOTE
Creatinine is 1.6 normal is 0.9-1 also BUN is elevated she is looks on the dry side will hydrate hold diuretics will also check a CK UA and bladder scan

## 2025-05-02 NOTE — PLAN OF CARE
Problem: Potential for Falls  Goal: Patient will remain free of falls  Description: INTERVENTIONS:- Educate patient/family on patient safety including physical limitations- Instruct patient to call for assistance with activity - Consult OT/PT to assist with strengthening/mobility - Keep Call bell within reach- Keep bed low and locked with side rails adjusted as appropriate- Keep care items and personal belongings within reach- Initiate and maintain comfort rounds- Make Fall Risk Sign visible to staff- Offer Toileting every 2 Hours, in advance of need- Initiate/Maintain bed alarm- Obtain necessary fall risk management equipment: non skid socks- Apply yellow socks and bracelet for high fall risk patients- Consider moving patient to room near nurses station  Outcome: Progressing

## 2025-05-02 NOTE — DISCHARGE SUMMARY
Discharge Summary - Hospitalist   Name: Bailey Olsen 54 y.o. female I MRN: [id number]  Unit/Bed#: -01 I Date of Admission: 5/2/2025   Date of Service: 5/2/2025 I Hospital Day: 0     Assessment & Plan  Falls  EKG nonischemic troponin is negative CT head is negative for acute abnormality CT cervical is negative for acute abnormality  Suspect could be postural orthostatic  Will hydrate and hold all diuretics at this time  Do orthostatics  PT OT  Liver cirrhosis secondary to HYATT (nonalcoholic steatohepatitis) (HCC)  With hyperammonemia but there is no hepatic encephalopathy-tremors are not known is 89 I really question if she is taking her lactulose will start lactulose every 2 hours still 3-4 bowel movements achieved then we will hold  Hold diuretics secondary to KISHORE and she looks and dehydrated side  Continue Xifaxan  Pancytopenia (HCC)  Secondary to liver cirrhosis with platelets being less than 70,000 we will hold heparin hemoglobin is stable is also has associated iron deficiency anemia is greater than 8 pancytopenia secondary to liver cirrhosis  Chronic, continuous use of opioids  She is on oxycodone 10 every 6 hours as needed secondary to chronic low back pain  Chest pain  For 3 days denies falling forward tender on palpation also on breathing suspected secondary to the healing and now rib fracture she also had some opacities in both lungs which are now improving could be pneumonitis also could be costochondritis nonischemic EKG troponin is negative will proceed with a heating pad and Lidoderm patch  Abdominal pain  Right side could be radiation down from the infiltration pneumonitis could be muscular skeletal as there is a little bruise on the right side she is postcholecystectomy no obvious abnormality on CT abdomen and pelvis could be radiation down from broken ribs will place with a Lidoderm patch.  And a heating pad  Acute kidney injury (HCC)  Creatinine is 1.6 normal is 0.9-1 also BUN is elevated  she is looks on the dry side will hydrate hold diuretics will also check a CK UA and bladder scan         VTE Pharmacologic Prophylaxis:   Moderate Risk (Score 3-4) - Pharmacological DVT Prophylaxis Contraindicated. Sequential Compression Devices Ordered.  Code Status: Full code  Discussion with family: Patient  Anticipated Length of Stay: Patient will be admitted on an observation basis with an anticipated length of stay of less than 2 midnights secondary to falls dizziness right-sided chest pain right upper abdominal pain.    History of Present Illness   Chief Complaint: Falls dizziness    Bailey Olsen is a 54 y.o. female with a PMH of liver cirrhosis who presents with 3 days of right-sided pain especially with taking a deep breath and also moving certain positions right upper quadrant pain the same amount of time also for the past 2 weeks she has been falling she has been feeling lightheaded , also has been having tremors falling asleep states she had 3 bowel movements yesterday none today and she has been taking her lactulose.  Has been eating and drinking states when she does stand up and walks around she does feel lightheaded.  No cough no shortness of breath    Review of Systems   Constitutional:  Negative for chills and fever.   HENT:  Negative for ear pain and sore throat.    Eyes:  Negative for pain and visual disturbance.   Respiratory:  Negative for cough and shortness of breath.    Cardiovascular:  Positive for chest pain. Negative for palpitations.   Gastrointestinal:  Positive for abdominal pain. Negative for vomiting.   Genitourinary:  Negative for dysuria and hematuria.   Musculoskeletal:  Negative for arthralgias and back pain.   Skin:  Negative for color change and rash.   Neurological:  Positive for dizziness. Negative for seizures and syncope.   All other systems reviewed and are negative.      Historical Information   Past Medical History:   Diagnosis Date    Back pain     Carcinoid tumor      neuroendocrine    CHF (congestive heart failure) (HCC)     Cirrhosis of liver (HCC)     Hypertension     Sarcoidosis      Past Surgical History:   Procedure Laterality Date    CHOLECYSTECTOMY      GASTRIC BYPASS      HERNIA REPAIR      IR KYPHOPLASTY/VERTEBROPLASTY  10/15/2024     Social History     Tobacco Use    Smoking status: Never    Smokeless tobacco: Never   Vaping Use    Vaping status: Never Used   Substance and Sexual Activity    Alcohol use: Never    Drug use: Never    Sexual activity: Not Currently     E-Cigarette/Vaping    E-Cigarette Use Never User      E-Cigarette/Vaping Substances    Nicotine No     THC No     CBD No     Flavoring No     Other No     Unknown No      Family History   Problem Relation Age of Onset    Hypertension Father     Heart attack Father     No Known Problems Sister     No Known Problems Brother     No Known Problems Brother     No Known Problems Brother     No Known Problems Brother     No Known Problems Brother     No Known Problems Daughter      Social History:  Marital Status: /Civil Union     Meds/Allergies   I have reviewed home medications using recent Epic encounter.  Prior to Admission medications    Medication Sig Start Date End Date Taking? Authorizing Provider   amitriptyline (ELAVIL) 50 mg tablet Take 1 tablet (50 mg total) by mouth daily at bedtime 3/1/25   Mar Oliva MD   atenolol (TENORMIN) 25 mg tablet Take 1 tablet (25 mg total) by mouth daily 2/13/25   Idalia Stuart MD   dapsone 25 mg tablet Take 50 mg by mouth daily    Historical Provider, MD   folic acid (FOLVITE) 1 mg tablet Take 1 mg by mouth daily 8/4/16 2/28/25  Historical Provider, MD   lactulose (CHRONULAC) 10 g/15 mL solution Take 45 mL (30 g total) by mouth 4 (four) times a day 2/13/25   Idalia Stuart MD   lidocaine (LIDODERM) 5 % Apply 1 patch topically over 12 hours daily Remove & Discard patch within 12 hours or as directed by MD 8/4/24   Ruslan Keyes MD   midodrine (PROAMATINE) 2.5  mg tablet Take 4 tablets (10 mg total) by mouth 3 (three) times a day before meals 2/13/25   Idalia Stuart MD   nystatin (MYCOSTATIN) powder Apply topically 2 (two) times a day 10/6/24 1/17/25  Idalia Stuart MD   pantoprazole (PROTONIX) 40 mg tablet Take 1 tablet (40 mg total) by mouth daily in the early morning 2/13/25   Idalia Stuart MD   polyethylene glycol (MIRALAX) 17 g packet Take 17 g by mouth daily as needed    Historical Provider, MD   potassium chloride (Klor-Con M20) 20 mEq tablet Take 2 tablets (40 mEq total) by mouth daily 2/13/25 3/15/25  Idalia Stuart MD   rifaximin (XIFAXAN) 550 mg tablet Take 550 mg by mouth every 12 (twelve) hours    Historical Provider, MD   sertraline (ZOLOFT) 100 mg tablet Take 1 tablet (100 mg total) by mouth daily 7/8/24   Mar Oliva MD   spironolactone (ALDACTONE) 25 mg tablet Take 1 tablet (25 mg total) by mouth daily 2/13/25 3/15/25  Idalia Stuart MD   torsemide (DEMADEX) 20 mg tablet Take 1 tablet (20 mg total) by mouth 2 (two) times a day 2/13/25 3/15/25  Idalia Stuart MD   ursodiol (ACTIGALL) 300 mg capsule Take 300 mg by mouth 2 (two) times a day    Historical Provider, MD     Allergies   Allergen Reactions    Zolpidem GI Intolerance, Other (See Comments) and Hives     Other Reaction(s): Other      Blacks out    Blacks out    Tapentadol Sneezing and Other (See Comments)     Zoned out    Alprazolam Other (See Comments)     Other Reaction(s): Other      Feels zoned out    Feels zoned out    Amoxicillin-Pot Clavulanate Hives and Nausea Only     Amoxil fine    Dust Mite Extract Sneezing    Morphine Headache, Nausea Only and Vomiting    Tramadol GI Intolerance    Azelastine Rash    Azelastine Hcl Rash    Molds & Smuts Rash     Other Reaction(s): Unknown       Objective :  Temp:  [98 °F (36.7 °C)] 98 °F (36.7 °C)  HR:  [90-97] 90  BP: (105-122)/(55-83) 122/60  Resp:  [17-25] 17  SpO2:  [91 %-98 %] 91 %  O2 Device: None (Room air)    Physical Exam  Vitals and nursing note  reviewed.   Constitutional:       General: She is not in acute distress.     Appearance: She is well-developed.   HENT:      Head: Normocephalic and atraumatic.   Eyes:      Conjunctiva/sclera: Conjunctivae normal.   Cardiovascular:      Rate and Rhythm: Normal rate and regular rhythm.      Heart sounds: No murmur heard.  Pulmonary:      Effort: Pulmonary effort is normal. No respiratory distress.      Breath sounds: Normal breath sounds. No wheezing or rales.   Chest:      Chest wall: Tenderness (Right-sided and palpation) present.   Abdominal:      General: There is no distension.      Palpations: Abdomen is soft.      Tenderness: There is abdominal tenderness (Right upper right side).   Musculoskeletal:         General: No swelling.      Cervical back: Neck supple.   Skin:     General: Skin is warm and dry.      Capillary Refill: Capillary refill takes less than 2 seconds.   Neurological:      Mental Status: She is alert and oriented to person, place, and time.      Comments: Tremors of hands   Psychiatric:         Mood and Affect: Mood normal.          Lines/Drains:            Lab Results: I have reviewed the following results:  Results from last 7 days   Lab Units 05/02/25  0854   WBC Thousand/uL 2.28*   HEMOGLOBIN g/dL 8.8*   HEMATOCRIT % 28.5*   PLATELETS Thousands/uL 69*   SEGS PCT % 64   LYMPHO PCT % 24   MONO PCT % 7   EOS PCT % 3     Results from last 7 days   Lab Units 05/02/25  0854   SODIUM mmol/L 136   POTASSIUM mmol/L 4.4   CHLORIDE mmol/L 100   CO2 mmol/L 30   BUN mg/dL 28*   CREATININE mg/dL 1.68*   ANION GAP mmol/L 6   CALCIUM mg/dL 8.7   ALBUMIN g/dL 3.1*   TOTAL BILIRUBIN mg/dL 1.09*   ALK PHOS U/L 191*   ALT U/L 17   AST U/L 40*   GLUCOSE RANDOM mg/dL 154*     Results from last 7 days   Lab Units 05/02/25  0854   INR  1.27*         Lab Results   Component Value Date    HGBA1C 4.7 02/26/2025    HGBA1C 5.9 (H) 02/27/2024    HGBA1C 5.9 08/03/2023     Results from last 7 days   Lab Units  05/02/25  1122 05/02/25  0854   LACTIC ACID mmol/L  --  1.4   PROCALCITONIN ng/ml 0.10  --        Imaging Results Review: I reviewed radiology reports from this admission including: CT chest and CT abdomen/pelvis.  Other Study Results Review: EKG was reviewed.     Administrative Statements   I have spent a total time of >45 minutes in caring for this patient on the day of the visit/encounter including Documenting in the medical record, Reviewing/placing orders in the medical record (including tests, medications, and/or procedures), Obtaining or reviewing history  , and Communicating with other healthcare professionals .    ** Please Note: This note has been constructed using a voice recognition system. **

## 2025-05-02 NOTE — ASSESSMENT & PLAN NOTE
Secondary to liver cirrhosis with platelets being less than 70,000 we will hold heparin hemoglobin is stable is also has associated iron deficiency anemia is greater than 8 pancytopenia secondary to liver cirrhosis

## 2025-05-02 NOTE — PLAN OF CARE
Problem: PHYSICAL THERAPY ADULT  Goal: Performs mobility at highest level of function for planned discharge setting.  See evaluation for individualized goals.  Description: Treatment/Interventions: Functional transfer training, LE strengthening/ROM, Elevations, Therapeutic exercise, Endurance training, Cognitive reorientation, Patient/family training, Equipment eval/education, Bed mobility, Gait training, Compensatory technique education          See flowsheet documentation for full assessment, interventions and recommendations.  Note: Prognosis: Fair  Problem List: Decreased strength, Decreased endurance, Impaired balance, Decreased mobility, Obesity, Pain, Decreased cognition, Impaired judgement  Assessment: Pt is a 54 y.o. female seen for PT evaluation s/p admission to [facility] on 5/2/2025 with Falls.  Order placed for PT services.  Upon evaluation: Pt is presenting with impaired functional mobility due to pain, decreased strength, decreased endurance, impaired balance, gait deviations, impaired cognition, decreased safety awareness, impaired judgment, and fall risk requiring  min assistance for bed mobility and min-mod assistance for transfers and ambulation with RW . Pt's clinical presentation is currently unstable/unpredictable given the functional mobility deficits above, especially decreased activity tolerance, coupled with fall risks as indicated by AM-PAC 6-Clicks: 15/24 as well as hx of falls, polypharmacy, and obesity and combined with medical complications of pain impacting overall mobility status, abnormal renal lab values, abnormal WBCs, abnormal CBC, abnormal blood sugars, readmission to hospital, and need for input for mobility technique/safety.  Pt's PMHx and comorbidities that may affect physical performance and progress include: CHF, HTN, obesity, and liver cirrhosis . Personal factors affecting pt at time of IE include: questionable non-compliance, anxiety, depression,  step(s) to enter environment, past experience, behavioral pattern, inability to perform IADLs, inability to navigate level surfaces without external assistance, inability to navigate community distances, and recent fall(s)/fall history. Pt will benefit from continued skilled PT services to address deficits as defined above and to maximize level of functional mobility to facilitate return toward PLOF and improved QOL. From PT/mobility standpoint, recommendation at time of d/c would be Level II (Moderate Resource Intensity pending progress in order to reduce fall risk and maximize pt's functional independence and consistency with mobility in order to facilitate return to PLOF.  Recommend trial with walker next 1-2 sessions.  Barriers to Discharge: Other (Comment)  Barriers to Discharge Comments: currently requiring hands on assist for mobility; feels lightheaded with mobility; high fall risk; high risk of re-admission; 1 FABRICIO; alone at times  Rehab Resource Intensity Level, PT: II (Moderate Resource Intensity)    See flowsheet documentation for full assessment.

## 2025-05-02 NOTE — PHYSICAL THERAPY NOTE
PHYSICAL THERAPY EVALUATION        NAME:  Bailey Olsen  DATE: 05/02/25    AGE:   54 y.o.  Mrn:   [id number]  ADMIT DX:  Postural lightheadedness [R42]  Acute kidney injury (HCC) [N17.9]  Liver cirrhosis secondary to HYATT (HCC) [K75.81, K74.60]  Right-sided chest pain [R07.9]  Chest pain varying with breathing [R07.1]    Past Medical History:   Diagnosis Date    Back pain     Carcinoid tumor     neuroendocrine    CHF (congestive heart failure) (HCC)     Cirrhosis of liver (HCC)     Hypertension     Sarcoidosis      Length Of Stay: 0  Performed at least 2 patient identifiers during session: Name and Birthday     PHYSICAL THERAPY EVALUATION:       05/02/25 1540   PT Last Visit   PT Visit Date 05/02/25   Note Type   Note type Evaluation   Pain Assessment   Pain Assessment Tool 0-10   Pain Score 7  (increases with deep breathing)   Pain Location/Orientation Orientation: Right;Location: Rib Cage   Restrictions/Precautions   Weight Bearing Precautions Per Order No   Other Precautions Fall Risk;Pain;Chair Alarm;Bed Alarm;Cognitive;Multiple lines;Impulsive   Home Living   Type of Home House   Home Layout Two level;Performs ADLs on one level;Able to live on main level with bedroom/bathroom  (1 FABRICIO)   Bathroom Shower/Tub Tub/shower unit   Bathroom Toilet Raised   Bathroom Equipment Grab bars in shower;Grab bars around toilet   Home Equipment Walker;Cane  (BSC (does not use BSC))   Prior Function   Level of Gage Independent with ADLs;Independent with functional mobility   Lives With Spouse;Daughter  (spouse works during day; dtr attends school)   Receives Help From Family   IADLs Independent with meal prep;Independent with medication management;Family/Friend/Other provides transportation   Falls in the last 6 months   (3)   Comments Uses walker when not feeling well.   General   Additional Pertinent History 18 encounters to ED/hospital within the last year.   Family/Caregiver Present No   Cognition   Overall Cognitive  "Status Impaired   Arousal/Participation Cooperative   Orientation Level Oriented X4   Following Commands Follows one step commands with increased time or repetition   Subjective   Subjective \"I just feel real wobbly and shakey\"   RLE Assessment   RLE Assessment WFL   LLE Assessment   LLE Assessment WFL   Bed Mobility   Supine to Sit 4  Minimal assistance   Additional items Assist x 1;Bedrails;Increased time required;Verbal cues   Transfers   Sit to Stand 4  Minimal assistance   Additional items Assist x 1;Increased time required;Verbal cues   Stand to Sit 4  Minimal assistance   Additional items Assist x 1;Increased time required;Verbal cues   Stand pivot 4  Minimal assistance   Additional items Assist x 1;Increased time required;Verbal cues   Toilet transfer   (mod A to lower with cues as pt would have likely sat on R edge of toilet seat without assist)   Additional items Verbal cues;Standard toilet;Assist x 1;Increased time required  (grab bar)   Additional Comments RW   Ambulation/Elevation   Gait pattern Improper Weight shift;Redundant gait at times;Excessively slow;Decreased hip extension   Gait Assistance 4  Minimal assist  (multiple LOBs posterior and laterally during ambulation with 1 instance requiring mod A)   Additional items Assist x 1;Verbal cues   Assistive Device Rolling walker   Distance 11 ft x 1; 16 ft x 1   Stair Management Assistance Not tested  (unsafe at this time)   Balance   Static Sitting Good   Dynamic Sitting Fair +   Static Standing Fair -   Dynamic Standing Poor +   Ambulatory Fair -  (RW)   Endurance Deficit   Endurance Deficit Yes   Endurance Deficit Description reports feeling lightheaded   Activity Tolerance   Activity Tolerance Patient limited by fatigue;Patient limited by pain  (lightheadedness)   Assessment   Prognosis Fair   Problem List Decreased strength;Decreased endurance;Impaired balance;Decreased mobility;Obesity;Pain;Decreased cognition;Impaired judgement   Barriers to " Discharge Other (Comment)   Barriers to Discharge Comments currently requiring hands on assist for mobility; feels lightheaded with mobility; high fall risk; high risk of re-admission; 1 FABRICIO; alone at times   Goals   STG Expiration Date 05/16/25   PT Treatment Day 0   Plan   Treatment/Interventions Functional transfer training;LE strengthening/ROM;Elevations;Therapeutic exercise;Endurance training;Cognitive reorientation;Patient/family training;Equipment eval/education;Bed mobility;Gait training;Compensatory technique education   PT Frequency 3-5x/wk   Discharge Recommendation   Rehab Resource Intensity Level, PT II (Moderate Resource Intensity)   AM-PAC Basic Mobility Inpatient   Turning in Flat Bed Without Bedrails 3   Lying on Back to Sitting on Edge of Flat Bed Without Bedrails 3   Moving Bed to Chair 3   Standing Up From Chair Using Arms 3   Walk in Room 2   Climb 3-5 Stairs With Railing 1   Basic Mobility Inpatient Raw Score 15   Basic Mobility Standardized Score 36.97   [facility] Highest Level Of Mobility   -HL Goal 4: Move to chair/commode   -HL Achieved 6: Walk 10 steps or more   End of Consult   Patient Position at End of Consult Supine;Bed/Chair alarm activated;All needs within reach      05/02/25 1545 05/02/25 1547 05/02/25 1550   Vitals   Pulse 92 98 105   Blood Pressure 109/57 108/56 101/58   MAP (mmHg) 74 73 72   Patient Position - Orthostatic VS Lying - Orthostatic VS Sitting - Orthostatic VS Standing - Orthostatic VS  (8/10 lightheadedness)      05/02/25 1556   Vitals   Pulse 102   Blood Pressure 134/70   MAP (mmHg) 91   Patient Position - Orthostatic VS Sitting  (post ambulation/toileting)     (Please find full objective findings from PT assessment regarding body systems outlined above).     Assessment: Pt is a 54 y.o. female seen for PT evaluation s/p admission [facility] on 5/2/2025 with Falls.  Order placed for PT services.  Upon evaluation: Pt is presenting with  impaired functional mobility due to pain, decreased strength, decreased endurance, impaired balance, gait deviations, impaired cognition, decreased safety awareness, impaired judgment, and fall risk requiring  min assistance for bed mobility and min-mod assistance for transfers and ambulation with RW . Pt's clinical presentation is currently unstable/unpredictable given the functional mobility deficits above, especially decreased activity tolerance, coupled with fall risks as indicated by AM-PAC 6-Clicks: 15/24 as well as hx of falls, polypharmacy, and obesity and combined with medical complications of pain impacting overall mobility status, abnormal renal lab values, abnormal WBCs, abnormal CBC, abnormal blood sugars, readmission to hospital, and need for input for mobility technique/safety.  Pt's PMHx and comorbidities that may affect physical performance and progress include: CHF, HTN, obesity, and liver cirrhosis . Personal factors affecting pt at time of IE include: questionable non-compliance, anxiety, depression, step(s) to enter environment, past experience, behavioral pattern, inability to perform IADLs, inability to navigate level surfaces without external assistance, inability to navigate community distances, and recent fall(s)/fall history. Pt will benefit from continued skilled PT services to address deficits as defined above and to maximize level of functional mobility to facilitate return toward PLOF and improved QOL. From PT/mobility standpoint, recommendation at time of d/c would be Level II (Moderate Resource Intensity pending progress in order to reduce fall risk and maximize pt's functional independence and consistency with mobility in order to facilitate return to PLOF.  Recommend trial with walker next 1-2 sessions.     The patient's AM-[facility] Basic Mobility Inpatient Short Form Raw Score is 15. A Raw score of less than or equal to 16 suggests the patient may benefit from discharge to post-acute  rehabilitation services. Please also refer to the recommendation of the Physical Therapist for safe discharge planning.       Goals: Pt will perform bed mobility tasks with modified I to reposition in bed and prepare for transfers. Pt will perform transfers with modified I to increase Indep in home environment and decrease burden of care and prepare for ambulation. Pt will ambulate with RW for >/= 150 ft with  modified I  to decrease risk for falls, improve activity tolerance, and improve gait quality and to access home environment. Pt will complete >/= 1 steps with with unilateral handrail with Supervision to return to home with FABRICIO.          Otoniel Lafleur, PT,DPT

## 2025-05-02 NOTE — ASSESSMENT & PLAN NOTE
Right side could be radiation down from the infiltration pneumonitis could be muscular skeletal as there is a little bruise on the right side she is postcholecystectomy no obvious abnormality on CT abdomen and pelvis could be radiation down from broken ribs will place with a Lidoderm patch.  And a heating pad

## 2025-05-02 NOTE — ASSESSMENT & PLAN NOTE
EKG nonischemic troponin is negative CT head is negative for acute abnormality CT cervical is negative for acute abnormality  Suspect could be postural orthostatic  Will hydrate and hold all diuretics at this time  Do orthostatics  PT OT  Apparently patient is on coreg and not atenolol from chart PCP review and also on lisinopril for htn and questionable if she was taking midodrine- I restarted midodrine hold lisinopril as has cheryle and also will need to check orthostatics as suspect her bp falls and she falls.will continue low dose coreg for now pending orthos

## 2025-05-02 NOTE — ASSESSMENT & PLAN NOTE
For 3 days denies falling forward tender on palpation also on breathing suspected secondary to the healing and now rib fracture she also had some opacities in both lungs which are now improving could be pneumonitis also could be costochondritis nonischemic EKG troponin is negative will proceed with a heating pad and Lidoderm patch

## 2025-05-03 LAB
ALBUMIN SERPL BCG-MCNC: 2.9 G/DL (ref 3.5–5)
ALP SERPL-CCNC: 170 U/L (ref 34–104)
ALT SERPL W P-5'-P-CCNC: 14 U/L (ref 7–52)
AMMONIA PLAS-SCNC: 104 UMOL/L (ref 18–72)
AMMONIA PLAS-SCNC: 63 UMOL/L (ref 18–72)
ANION GAP SERPL CALCULATED.3IONS-SCNC: 7 MMOL/L (ref 4–13)
AST SERPL W P-5'-P-CCNC: 43 U/L (ref 13–39)
BASOPHILS # BLD AUTO: 0.02 THOUSANDS/ÂΜL (ref 0–0.1)
BASOPHILS NFR BLD AUTO: 1 % (ref 0–1)
BILIRUB SERPL-MCNC: 0.88 MG/DL (ref 0.2–1)
BUN SERPL-MCNC: 28 MG/DL (ref 5–25)
CALCIUM ALBUM COR SERPL-MCNC: 9.5 MG/DL (ref 8.3–10.1)
CALCIUM SERPL-MCNC: 8.6 MG/DL (ref 8.4–10.2)
CHLORIDE SERPL-SCNC: 104 MMOL/L (ref 96–108)
CO2 SERPL-SCNC: 27 MMOL/L (ref 21–32)
CREAT SERPL-MCNC: 1.96 MG/DL (ref 0.6–1.3)
EOSINOPHIL # BLD AUTO: 0.08 THOUSAND/ÂΜL (ref 0–0.61)
EOSINOPHIL NFR BLD AUTO: 4 % (ref 0–6)
ERYTHROCYTE [DISTWIDTH] IN BLOOD BY AUTOMATED COUNT: 16.6 % (ref 11.6–15.1)
GFR SERPL CREATININE-BSD FRML MDRD: 28 ML/MIN/1.73SQ M
GLUCOSE SERPL-MCNC: 91 MG/DL (ref 65–140)
HCT VFR BLD AUTO: 27.5 % (ref 34.8–46.1)
HGB BLD-MCNC: 8.3 G/DL (ref 11.5–15.4)
IMM GRANULOCYTES # BLD AUTO: 0 THOUSAND/UL (ref 0–0.2)
IMM GRANULOCYTES NFR BLD AUTO: 0 % (ref 0–2)
LYMPHOCYTES # BLD AUTO: 0.68 THOUSANDS/ÂΜL (ref 0.6–4.47)
LYMPHOCYTES NFR BLD AUTO: 31 % (ref 14–44)
MCH RBC QN AUTO: 28 PG (ref 26.8–34.3)
MCHC RBC AUTO-ENTMCNC: 30.2 G/DL (ref 31.4–37.4)
MCV RBC AUTO: 93 FL (ref 82–98)
MONOCYTES # BLD AUTO: 0.2 THOUSAND/ÂΜL (ref 0.17–1.22)
MONOCYTES NFR BLD AUTO: 9 % (ref 4–12)
NEUTROPHILS # BLD AUTO: 1.24 THOUSANDS/ÂΜL (ref 1.85–7.62)
NEUTS SEG NFR BLD AUTO: 55 % (ref 43–75)
NRBC BLD AUTO-RTO: 0 /100 WBCS
PLATELET # BLD AUTO: 69 THOUSANDS/UL (ref 149–390)
PMV BLD AUTO: 11 FL (ref 8.9–12.7)
POTASSIUM SERPL-SCNC: 4.7 MMOL/L (ref 3.5–5.3)
PROT SERPL-MCNC: 6.9 G/DL (ref 6.4–8.4)
RBC # BLD AUTO: 2.96 MILLION/UL (ref 3.81–5.12)
SODIUM SERPL-SCNC: 138 MMOL/L (ref 135–147)
WBC # BLD AUTO: 2.22 THOUSAND/UL (ref 4.31–10.16)

## 2025-05-03 PROCEDURE — 80053 COMPREHEN METABOLIC PANEL: CPT | Performed by: FAMILY MEDICINE

## 2025-05-03 PROCEDURE — 99232 SBSQ HOSP IP/OBS MODERATE 35: CPT | Performed by: FAMILY MEDICINE

## 2025-05-03 PROCEDURE — 97167 OT EVAL HIGH COMPLEX 60 MIN: CPT

## 2025-05-03 PROCEDURE — 85025 COMPLETE CBC W/AUTO DIFF WBC: CPT | Performed by: FAMILY MEDICINE

## 2025-05-03 PROCEDURE — 82140 ASSAY OF AMMONIA: CPT | Performed by: FAMILY MEDICINE

## 2025-05-03 PROCEDURE — 99254 IP/OBS CNSLTJ NEW/EST MOD 60: CPT | Performed by: INTERNAL MEDICINE

## 2025-05-03 RX ORDER — ALBUMIN (HUMAN) 12.5 G/50ML
25 SOLUTION INTRAVENOUS EVERY 6 HOURS
Status: DISCONTINUED | OUTPATIENT
Start: 2025-05-03 | End: 2025-05-04

## 2025-05-03 RX ORDER — LACTULOSE 10 G/15ML
30 SOLUTION ORAL
Status: DISCONTINUED | OUTPATIENT
Start: 2025-05-03 | End: 2025-05-03

## 2025-05-03 RX ORDER — LACTULOSE 10 G/15ML
30 SOLUTION ORAL 4 TIMES DAILY
Status: DISCONTINUED | OUTPATIENT
Start: 2025-05-04 | End: 2025-05-04

## 2025-05-03 RX ADMIN — URSODIOL 300 MG: 300 CAPSULE ORAL at 16:54

## 2025-05-03 RX ADMIN — LACTULOSE 20 G: 20 SOLUTION ORAL at 00:30

## 2025-05-03 RX ADMIN — LACTULOSE 20 G: 20 SOLUTION ORAL at 02:13

## 2025-05-03 RX ADMIN — LACTULOSE 30 G: 20 SOLUTION ORAL at 12:27

## 2025-05-03 RX ADMIN — LACTULOSE 20 G: 20 SOLUTION ORAL at 06:04

## 2025-05-03 RX ADMIN — RIFAXIMIN 550 MG: 550 TABLET ORAL at 23:03

## 2025-05-03 RX ADMIN — ALBUMIN (HUMAN) 25 G: 0.25 INJECTION, SOLUTION INTRAVENOUS at 21:55

## 2025-05-03 RX ADMIN — MIDODRINE HYDROCHLORIDE 10 MG: 5 TABLET ORAL at 06:04

## 2025-05-03 RX ADMIN — RIFAXIMIN 550 MG: 550 TABLET ORAL at 12:27

## 2025-05-03 RX ADMIN — OXYCODONE HYDROCHLORIDE 10 MG: 10 TABLET ORAL at 07:52

## 2025-05-03 RX ADMIN — LACTULOSE 30 G: 20 SOLUTION ORAL at 10:11

## 2025-05-03 RX ADMIN — ALBUMIN (HUMAN) 25 G: 0.25 INJECTION, SOLUTION INTRAVENOUS at 15:00

## 2025-05-03 RX ADMIN — ALBUMIN (HUMAN) 25 G: 0.25 INJECTION, SOLUTION INTRAVENOUS at 08:00

## 2025-05-03 RX ADMIN — LIDOCAINE 5% 1 PATCH: 700 PATCH TOPICAL at 15:01

## 2025-05-03 RX ADMIN — OXYCODONE HYDROCHLORIDE 10 MG: 10 TABLET ORAL at 15:02

## 2025-05-03 RX ADMIN — AMITRIPTYLINE HYDROCHLORIDE 50 MG: 25 TABLET, FILM COATED ORAL at 21:53

## 2025-05-03 RX ADMIN — MIDODRINE HYDROCHLORIDE 10 MG: 5 TABLET ORAL at 12:27

## 2025-05-03 RX ADMIN — LACTULOSE 20 G: 20 SOLUTION ORAL at 07:54

## 2025-05-03 RX ADMIN — DAPSONE 50 MG: 25 TABLET ORAL at 07:55

## 2025-05-03 RX ADMIN — MIDODRINE HYDROCHLORIDE 10 MG: 5 TABLET ORAL at 16:54

## 2025-05-03 RX ADMIN — URSODIOL 300 MG: 300 CAPSULE ORAL at 07:55

## 2025-05-03 RX ADMIN — RIFAXIMIN 550 MG: 550 TABLET ORAL at 00:30

## 2025-05-03 RX ADMIN — OXYCODONE HYDROCHLORIDE 10 MG: 10 TABLET ORAL at 23:03

## 2025-05-03 RX ADMIN — PANTOPRAZOLE SODIUM 40 MG: 40 TABLET, DELAYED RELEASE ORAL at 06:04

## 2025-05-03 RX ADMIN — LACTULOSE 20 G: 20 SOLUTION ORAL at 04:06

## 2025-05-03 RX ADMIN — SERTRALINE 100 MG: 100 TABLET, FILM COATED ORAL at 07:54

## 2025-05-03 NOTE — PLAN OF CARE
Problem: OCCUPATIONAL THERAPY ADULT  Goal: Performs self-care activities at highest level of function for planned discharge setting.  See evaluation for individualized goals.  Description: Treatment Interventions: ADL retraining, Endurance training, Functional transfer training, UE strengthening/ROM, Cognitive reorientation, Patient/family training, Equipment evaluation/education, Compensatory technique education, Energy conservation, Activityengagement          See flowsheet documentation for full assessment, interventions and recommendations.   Note: Limitation: Decreased ADL status, Decreased UE ROM, Decreased UE strength, Decreased Safe judgement during ADL, Decreased cognition, Decreased endurance, Decreased high-level ADLs, Decreased self-care trans  Prognosis: Fair  Assessment: Pt is a 54 y.o. female who presented to [facility] on 5/2/2025 with R sided chest pain and frequent falls. Pt with diagnosis of falls. Pt  has a past medical history of Back pain, Carcinoid tumor, CHF (congestive heart failure) (HCC), Cirrhosis of liver (HCC), Hypertension, and Sarcoidosis. Pt greeted up in chair for OT evaluation on 05/03/25. Pt resides with spouse and daughter in a [facility] with [facility]. PTA, Pt reports being I with ADLs (spouse supervises showers)/functional mobility with RW when she is not feeling well/I with IADLs/family provides transportation. Pt demonstrating the following occupational performance levels: Min A with UB ADLs, Mod A  with LB ADLs, Min A with functional transfers, and Min A with functional mobility with RW. Limitations that impact functional performance include decreased ADL status, UE ROM, UE strength, safe judgement during ADLs, cognition, endurance, self care transfers, and high level ADLs. Occupational performance areas to address ADL retraining, functional transfer training, UE strengthening/ROM, endurance training, cognitive reorientation, Pt/caregiver education, equipment evaluation/education,  compensatory technique education, energy conservation, and activity engagement . Pt would benefit from continued skilled OT services while in hospital to maximize independence with ADLs. Will continue to follow Pt's progress. Pt would benefit from level II resources (moderate intensity) upon DC to maximize safety and independence with ADLs and functional tasks of choice.     Rehab Resource Intensity Level, OT: II (Moderate Resource Intensity)

## 2025-05-03 NOTE — ASSESSMENT & PLAN NOTE
For 3 days denies falling forward tender on palpation also on breathing suspected secondary to the healing and now rib fracture she also had some opacities in both lungs which are now improving could be pneumonitis also could be costochondritis nonischemic EKG troponin is negative will proceed with a heating pad and Lidoderm patch  5/3 slight improvement

## 2025-05-03 NOTE — UTILIZATION REVIEW
Initial Clinical Review - OBS 5/2/25, converted to Inpatient 5/3/25 due to KISHORE, hyperammonemia.     Admission: Date/Time/Statement:   Admission Orders (From admission, onward)       Ordered        05/03/25 0758  INPATIENT ADMISSION  Once            05/02/25 1156  Place in Observation  Once                          Orders Placed This Encounter   Procedures    INPATIENT ADMISSION     Standing Status:   Standing     Number of Occurrences:   1     Level of Care:   Med Surg [16]     Estimated length of stay:   More than 2 Midnights     Certification:   I certify that inpatient services are medically necessary for this patient for a duration of greater than two midnights. See H&P and MD Progress Notes for additional information about the patient's course of treatment.     ED Arrival Information       Expected   -    Arrival   5/2/2025 08:15    Acuity   Urgent              Means of arrival   Wheelchair    Escorted by   [place]    Service   Hospitalist    Admission type   Emergency              Arrival complaint   Fall 4/30  RUST, nbt  chest pain with certain movement             Chief Complaint   Patient presents with    Pain With Breathing     Patient reports R sided chest pain that is worse with breathing. States she has had multiple falls and has broken ribs on R side. Patient states that she has had bloating, nausea, tremors, and pain is radiating down to R side of abdomen.       Initial Presentation: 54 y.o. female with a PMH of liver cirrhosis and chronic back pain who presents to the ED from home with complaint of three days of right-sided chest pain especially with taking a deep breath and also moving certain positions, as well as  right upper quadrant pain.  For the past 2 weeks she has been feeling lightheaded and falling. Taking lactulose, but no bowel movements today. Workup reveals WBC 2.28, platelets 69,000, hemoglobin 8.8.  Ammonia 82, alk phos 191, AST 40, Tbili 1.09. Creatinine 1.68 (baseline 0.9-1.0). In  the ED, patient received IVF, Dilaudid and Zofran.   Exam:  AOx3. Right chest wall tenderness, RUQ abdominal tenderness.    5/2 Admit to Observation for evaluation and treatment of falls, liver cirrhosis secondary to HYATT with hyperammonemia, pancytopenia, KISHORE:  Check orthostatics, PT/OT eval. Restart Midodrine.  Start lactulose Q2H until 3-4 bowel movements achieved, continue Xifaxan. IVF, hold diuretics, lisinopril.     5/2 Physical Therapy discharge recommendation is Rehab Resource Intensity Level II PT, Moderate Resource Intensity.      5/3 Nephrology consult:  KISHORE. Potential etiologies include volume depletion vs ATN vs HRS. Cr baseline 0.9-1.1. Cr 1.68 on admission and 1.96 today. UA bland. CT 5/2 no obstruction. Plan:  Continue fluid trial but decrease plasmalyte to 50ml/hr, monitor volume status closely. Appears euvolemic at present. Continue albumin as ordered for 48 hours. Hold diuretic. Check urine studies and uric acid.  Monitor I/O. Hold coreg. Agree with midodrine 10mg TID,reports she was taking at home. Internal Medicine:  Creatinine worsened on IVF, Nephrology evaluated, will add albumin Q6H x 2 doses. D/C Coreg. Ammonia increased today, only had two bowel movements yesterday.  Will increase lactulose to 30 g Q2H, aim for 4-5 bowel movements daily.  AM labs.  Certification Statement: The patient will continue to require additional inpatient hospital stay due to secondary to hyperammonemia KISHORE.    5/4 Nephrology:  Creatinine much improved to 1.3. Advise to stop plasmalyte, she has evidence of LE edema. Suspect given response, a prerenal state with FENa 0.7%. Can finish up albumin today as ordered --dose 50 gram then stop, received 25 gram today. This would be 48 hour of albumin administration. Hold diuretics today and look to resume tomorrow.  Continue midodrine. Internal Medicine:  Orthostatics reviewed.  Abdominal pain resolved, chest pain improved. Nephrology discontinued IVF, restart diuretics  tomorrow.  Has had multiple bowel movements, ammonia now normal. PT/OT recommendation is Level 2. Patient is considering Rehab. Case Management:  Patient would like to go to [facility] for [facility] at time of D/C. [facility], initiating prior auth.       ED Treatment-Medication Administration from 05/02/2025 0809 to 05/02/2025 1257         Date/Time Order Dose Route Action     05/02/2025 0858 ondansetron (ZOFRAN) injection 4 mg 4 mg Intravenous Given     05/02/2025 1025 multi-electrolyte (Plasmalyte-A/Isolyte-S PH 7.4/Normosol-R) IV bolus 1,000 mL 1,000 mL Intravenous New Bag     05/02/2025 1049 HYDROmorphone (DILAUDID) injection 1 mg 1 mg Intravenous Given            Scheduled Medications:      amitriptyline, 50 mg, Oral, HS  dapsone, 50 mg, Oral, Daily  lactulose, 30 g, Oral, Q2H  lidocaine, 1 patch, Topical, Daily  midodrine, 10 mg, Oral, TID AC  pantoprazole, 40 mg, Oral, Early Morning  rifaximin, 550 mg, Oral, Q12H MIGUEL  sertraline, 100 mg, Oral, Daily  ursodiol, 300 mg, Oral, BID    albumin human (FLEXBUMIN) 25 % injection 25 g x 4 doses  Dose: 25 g  Freq: Every 6 hours Route: IV  Last Dose: 0 g (05/03/25 1520)  Start: 05/03/25 0800 End: 05/04/25 0821     lactulose (CHRONULAC) oral solution 30 g  Dose: 30 g  Freq: 4 times daily Route: PO  Start: 05/04/25 0900    lactulose (CHRONULAC) oral solution 30 g  Dose: 30 g  Freq: Every 2 hours Route: PO  Start: 05/03/25 0945 End: 05/03/25 1342    lactulose (CHRONULAC) oral solution 20 g  Dose: 20 g  Freq: Every 2 hours Route: PO  Start: 05/02/25 1345 End: 05/03/25 0759    Continuous IV Infusions:    multi-electrolyte (Plasmalyte-A/Isolyte-S PH 7.4/Normosol-R) IV solution  Rate: 50 mL/hr Dose: 50 mL/hr  Freq: Continuous Route: IV  Last Dose: Stopped (05/04/25 0738)  Start: 05/02/25 1345 End: 05/04/25 0718       PRN Meds:    Artificial Tears, 1 drop, Both Eyes, Q4H PRN  oxyCODONE, 10 mg, Oral, Q6H PRN x 1 dose 5/2, x 3 doses 5/3, x 1 dose 5/4 thus far      ED  Triage Vitals   Temperature Pulse Respirations Blood Pressure SpO2 Pain Score   05/02/25 0832 05/02/25 0828 05/02/25 0828 05/02/25 0828 05/02/25 0828 05/02/25 0828   98 °F (36.7 °C) 97 18 120/58 96 % 7     Weight (last 2 days)       Date/Time Weight    05/02/25 1321 117 (258.82)    05/02/25 0826 116 (255.29)            Vital Signs (last 3 days)      Date/Time Temp Pulse Resp BP MAP (mmHg) SpO2 O2 Device Patient Position - Orthostatic VS Pain   05/04/25 0857 -- -- -- -- -- -- -- -- 6   05/04/25 07:16:01 97.3 °F (36.3 °C) 86 17 122/70 87 94 % -- -- --   05/04/25 07:12:02 97.3 °F (36.3 °C) 87 19 117/76 90 93 % -- -- --   05/04/25 06:26:23 -- 86 -- 117/61 80 94 % -- -- --   05/04/25 04:32:01 -- -- -- 98/50 66 -- -- -- --   05/04/25 04:29:57 -- 86 -- 122/58 79 91 % -- -- --   05/04/25 04:28:30 -- 85 -- 122/58 79 93 % -- -- --   05/04/25 0144 -- 83 -- -- -- -- -- -- --   05/03/25 2303 -- -- -- -- -- -- -- -- 6 05/03/25 22:53:22 97.3 °F (36.3 °C) 91 18 126/58 81 90 % -- -- --   05/03/25 2204 -- -- -- -- -- 93 % None (Room air) -- 3   05/03/25 1553 -- -- 18 104/51 69 -- None (Room air) Standing - Orthostatic VS --   05/03/25 1552 -- -- 18 116/62 80 -- None (Room air) Sitting - Orthostatic VS --   05/03/25 1551 97 °F (36.1 °C) -- 18 109/63 78 -- None (Room air) Lying - Orthostatic VS --   05/03/25 1502 -- -- -- -- -- -- -- -- 6 05/03/25 12:29:19 -- 81 -- 120/71 87 97 % -- -- --   05/03/25 1102 -- -- -- -- -- -- -- -- No Pain   05/03/25 1018 -- -- -- 98/56 -- -- -- Standing for 3 minutes - Orthostatic VS --   05/03/25 09:16:19 -- 91 -- 140/118 125 94 % -- -- --   05/03/25 09:14:15 -- 80 -- 113/55 74 96 % -- -- --   05/03/25 0900 -- -- -- -- -- -- None (Room air) -- --   05/03/25 0752 -- -- -- -- -- -- -- -- 6 05/03/25 07:40:06 97 °F (36.1 °C) 78 20 92/46 61 96 % -- -- --   05/03/25 06:02:27 -- 73 -- 94/48 63 94 % -- -- --   05/03/25 02:15:50 -- 82 -- 100/57 71 91 % -- -- --   05/03/25 00:34:11 -- 79 -- 102/56 71 92 % --  -- --   05/03/25 0031 97 °F (36.1 °C) 73 18 102/56 71 90 % None (Room air) Lying --   05/02/25 21:11:20 97.3 °F (36.3 °C) 74 -- 105/49 68 90 % -- -- --   05/02/25 21:08:01 97.3 °F (36.3 °C) 80 19 71/42 52 90 % -- -- --   05/02/25 2059 -- -- -- -- -- 92 % None (Room air) -- 3   05/02/25 1918 -- -- -- -- -- -- -- -- 8   05/02/25 15:56:18 -- 102 -- 134/70 91 93 % -- Sitting --   05/02/25 15:50:37 -- 105 -- 101/58 72 94 % -- Standing - Orthostatic VS --   05/02/25 15:47:40 -- 98 -- 108/56 73 96 % -- Sitting - Orthostatic VS --   05/02/25 15:45:45 -- 92 -- 109/57 74 96 % -- Lying - Orthostatic VS --   05/02/25 1540 -- -- -- -- -- -- -- -- 7   05/02/25 13:25:35 97.3 °F (36.3 °C) 93 18 109/59 76 94 % None (Room air) Lying --   05/02/25 1321 -- -- -- -- -- 95 % None (Room air) -- 4   05/02/25 1230 -- 90 17 122/60 87 91 % -- -- --   05/02/25 1103 -- -- -- -- -- -- None (Room air) -- --   05/02/25 1049 -- -- -- -- -- -- -- -- 8   05/02/25 1045 -- 93 19 109/55 78 98 % None (Room air) -- --   05/02/25 1030 -- 97 18 105/83 90 96 % None (Room air) Lying --   05/02/25 0832 98 °F (36.7 °C) 92 25 120/58 83 97 % -- -- --   05/02/25 0828 -- 97 18 120/58 83 96 % None (Room air) Lying 7     Pertinent Labs/Diagnostic Test Results:     Radiology:  CT chest abdomen pelvis wo contrast   Final Interpretation by Laura Westbrook MD (05/02 1053)      No acute abnormality. Healing/subacute right anterior tenth rib fracture at the costochondral junction, unchanged from recent prior CT although new from 2/27/2025.      Patchy groundglass opacities in bilateral anterior upper lobes appears improved in comparison to prior CT dated 4/21/2025, likely infectious/inflammatory.      Cirrhotic liver morphology with stigmata of portal hypertension including splenomegaly and small ascites.      The study was marked in EPIC for immediate notification.         Workstation performed: [id number]         CT head without contrast   Final Interpretation by Triston  MD Rell (05/02 1047)      No acute intracranial abnormality.                  Resident: SOLIS Kenney I, the attending radiologist, have reviewed the images and agree with the final report above.      Workstation performed: [id number]         CT spine cervical without contrast   Final Interpretation by Triston Zacarias MD (05/02 1049)      No cervical spine fracture or traumatic malalignment.                  Resident: SOLIS Kenney I, the attending radiologist, have reviewed the images and agree with the final report above.      Workstation performed: [id number]           Cardiology:    ECG 12 lead   Final Result by Chapito Morris DO (05/02 0832)   Normal sinus rhythm   Right axis deviation   Abnormal ECG   When compared with ECG of 21-Apr-2025 09:28,   QRS axis Shifted right   Confirmed by Chapito Morris ([id number]) on 5/2/2025 8:32:55 AM              Results from last 7 days   Lab Units 05/04/25 0440 05/03/25  0556 05/02/25  0854   WBC Thousand/uL 2.45* 2.22* 2.28*   HEMOGLOBIN g/dL 8.4* 8.3* 8.8*   HEMATOCRIT % 28.1* 27.5* 28.5*   PLATELETS Thousands/uL 70* 69* 69*   TOTAL NEUT ABS Thousands/µL 1.42* 1.24* 1.46*         Results from last 7 days   Lab Units 05/04/25  0440 05/03/25  0556 05/02/25  0854   SODIUM mmol/L 136 138 136   POTASSIUM mmol/L 4.4 4.7 4.4   CHLORIDE mmol/L 103 104 100   CO2 mmol/L 29 27 30   ANION GAP mmol/L 4 7 6   BUN mg/dL 24 28* 28*   CREATININE mg/dL 1.33* 1.96* 1.68*   EGFR ml/min/1.73sq m 45 28 33   CALCIUM mg/dL 9.1 8.6 8.7   MAGNESIUM mg/dL  --   --  2.0     Results from last 7 days   Lab Units 05/04/25  0440 05/03/25  1300 05/03/25  0556 05/02/25  1227 05/02/25  0854   AST U/L 40*  --  43*  --  40*   ALT U/L 16  --  14  --  17   ALK PHOS U/L 182*  --  170*  --  191*   TOTAL PROTEIN g/dL 7.8  --  6.9  --  7.7   ALBUMIN g/dL 3.7  --  2.9*  --  3.1*   TOTAL BILIRUBIN mg/dL 0.94  --  0.88  --  1.09*   BILIRUBIN DIRECT mg/dL  --   --   --   --  0.42*   AMMONIA umol/L 56 63 104*  92*  --          Results from last 7 days   Lab Units 05/04/25  0440 05/03/25  0556 05/02/25  0854   GLUCOSE RANDOM mg/dL 91 91 154*             Results from last 7 days   Lab Units 05/02/25  1227   CK TOTAL U/L 35     Results from last 7 days   Lab Units 05/02/25  1227 05/02/25  1122 05/02/25  0854   HS TNI 0HR ng/L  --   --  3   HS TNI 2HR ng/L  --  3  --    HSTNI D2 ng/L  --  0  --    HS TNI 4HR ng/L 4  --   --    HSTNI D4 ng/L 1  --   --          Results from last 7 days   Lab Units 05/02/25  0854   PROTIME seconds 16.2*   INR  1.27*         Results from last 7 days   Lab Units 05/02/25  1122   PROCALCITONIN ng/ml 0.10     Results from last 7 days   Lab Units 05/02/25  0854   LACTIC ACID mmol/L 1.4             Results from last 7 days   Lab Units 05/04/25  0437 05/02/25  1146   CLARITY UA   --  Clear   COLOR UA   --  Yellow   SPEC GRAV UA   --  <=1.005   PH UA   --  7.0   GLUCOSE UA mg/dl  --  Negative   KETONES UA mg/dl  --  Negative   BLOOD UA   --  Negative   PROTEIN UA mg/dl  --  Negative   NITRITE UA   --  Negative   BILIRUBIN UA   --  Negative   UROBILINOGEN UA E.U./dl  --  0.2   LEUKOCYTES UA   --  Negative   SODIUM UR mmol/L 47.0  --    CREATININE UR mg/dL 64.7  --                  Past Medical History:   Diagnosis Date    Back pain     Carcinoid tumor     neuroendocrine    CHF (congestive heart failure) (HCC)     Cirrhosis of liver (HCC)     Hypertension     Sarcoidosis      Present on Admission:   Liver cirrhosis secondary to HYATT (nonalcoholic steatohepatitis) (HCC)   Pancytopenia (HCC)   Chronic, continuous use of opioids      Admitting Diagnosis: Postural lightheadedness [R42]  Acute kidney injury (HCC) [N17.9]  Liver cirrhosis secondary to HYATT (HCC) [K75.81, K74.60]  Right-sided chest pain [R07.9]  Chest pain varying with breathing [R07.1]  Age/Sex: 54 y.o. female    Network Utilization Review Department  ATTENTION: Please call with any questions or concerns to [phone] and carefully listen to  the prompts so that you are directed to the right person. All voicemails are confidential.   For Discharge needs, contact Care Management DC Support Team at [phone] opt. [phone]  Send all requests for admission clinical reviews, approved or denied determinations and any other requests to dedicated fax number below belonging to the campus where the patient is receiving treatment. List of dedicated fax numbers for the Facilities:  FACILITY NAME UR FAX NUMBER   ADMISSION DENIALS (Administrative/Medical Necessity) [phone]   DISCHARGE SUPPORT TEAM (NETWORK) [phone]   PARENT CHILD HEALTH (Maternity/NICU/Pediatrics) [phone]   [facility] [phone]   [facility] [phone]   [facility] [phone]   [facility] [phone]   Pending sale to [facility] [phone]   [facility] [phone]   [facility] [phone]   [facility] [phone]   [facility] [phone]   [facility] [phone]   [facility] [phone]   [facility] [phone]

## 2025-05-03 NOTE — ASSESSMENT & PLAN NOTE
Creatinine is 1.6 normal is 0.9-1 creatinine worsened on fluids today questionable HRS questionable ATN will ask nephrology to see continue fluids for now further adjustment per nephrology CT abdomen and pelvis did not reveal any obstruction.  Will start treating for HRS will add to midodrine will add albumin every 6 hours for 2 doses will see if nephrology would want octreotide discontinue Coreg as she was on the hypotensive side to avoid hypotension  Am labs

## 2025-05-03 NOTE — PROGRESS NOTES
Progress Note - Hospitalist   Name: Bailey Olsen 54 y.o. female I MRN: [id number]  Unit/Bed#: -01 I Date of Admission: 5/2/2025   Date of Service: 5/3/2025 I Hospital Day: 0    Assessment & Plan  Falls  EKG nonischemic troponin is negative CT head is negative for acute abnormality CT cervical is negative for acute abnormality  Suspect could be postural orthostatic  Will hydrate and hold all diuretics at this time  Do orthostatics  PT OT  Apparently patient is on coreg and not atenolol from chart PCP review and also on lisinopril for htn and questionable if she was taking midodrine- I restarted midodrine hold lisinopril as has kishore and also will need to check orthostatics as suspect her bp falls and she falls.will continue low dose coreg for now pending orthos    Liver cirrhosis secondary to HYATT (nonalcoholic steatohepatitis) (HCC)  With hyperammonemia but there is no hepatic encephalopathy-tremors , only had 2 bowel movements yesterday ammonia increased today to about 100 will increase lactulose to 30 g every 2 hours aim for 4-5 bowel movements a day hopefully that could be done so could repeat ammonia later on  Hold diuretics secondary to KISHORE   Continue Xifaxan  Pancytopenia (HCC)  Secondary to liver cirrhosis with platelets being less than 70,000 we will hold heparin hemoglobin is stable is also has associated iron deficiency anemia is greater than 8 pancytopenia secondary to liver cirrhosis  Chronic, continuous use of opioids  She is on oxycodone 10 every 6 hours as needed secondary to chronic low back pain  Chest pain  For 3 days denies falling forward tender on palpation also on breathing suspected secondary to the healing and now rib fracture she also had some opacities in both lungs which are now improving could be pneumonitis also could be costochondritis nonischemic EKG troponin is negative will proceed with a heating pad and Lidoderm patch  5/3 slight improvement  Abdominal pain  Right side could  be radiation down from the infiltration pneumonitis could be muscular skeletal as there is a little bruise on the right side she is postcholecystectomy no obvious abnormality on CT abdomen and pelvis could be radiation down from broken ribs will place with a Lidoderm patch.  And a heating pad  Acute kidney injury (HCC)  Creatinine is 1.6 normal is 0.9-1 creatinine worsened on fluids today questionable HRS questionable ATN will ask nephrology to see continue fluids for now further adjustment per nephrology CT abdomen and pelvis did not reveal any obstruction.  Will start treating for HRS will add to midodrine will add albumin every 6 hours for 2 doses will see if nephrology would want octreotide discontinue Coreg as she was on the hypotensive side to avoid hypotension  Am labs    VTE Pharmacologic Prophylaxis: VTE Score: 3 Moderate Risk (Score 3-4) - Pharmacological DVT Prophylaxis Contraindicated. Sequential Compression Devices Ordered.    Mobility:   Basic Mobility Inpatient Raw Score: 15  JH-HL Goal: 4: Move to chair/commode  JH-HLM Achieved: 5: Stand (1 or more minutes)  JH-HLM Goal achieved. Continue to encourage appropriate mobility.    Patient Centered Rounds: I performed bedside rounds with nursing staff today.   Discussions with Specialists or Other Care Team Provider: Will discuss with nephrology    Education and Discussions with Family / Patient: Patient will update family    Current Length of Stay: 0 day(s)  Current Patient Status: Inpatient   Certification Statement: The patient will continue to require additional inpatient hospital stay due to secondary to hyperammonemia KISHORE  Discharge Plan: Anticipate discharge in 48-72 hrs to home.    Code Status: Level 1 - Full Code    Subjective   Seen and examined right-sided chest pain slightly improved but still on the right upper quadrant there is tenderness    Objective :  Temp:  [97 °F (36.1 °C)-97.3 °F (36.3 °C)] 97 °F (36.1 °C)  HR:  [] 91  BP:  ()/() 98/56  Resp:  [17-20] 20  SpO2:  [90 %-96 %] 94 %  O2 Device: None (Room air)    Body mass index is 43.07 kg/m².     Input and Output Summary (last 24 hours):     Intake/Output Summary (Last 24 hours) at 5/3/2025 1140  Last data filed at 5/3/2025 0845  Gross per 24 hour   Intake 900 ml   Output 150 ml   Net 750 ml       Physical Exam  Vitals and nursing note reviewed.   Constitutional:       General: She is not in acute distress.     Appearance: She is well-developed.   HENT:      Head: Normocephalic and atraumatic.   Eyes:      Conjunctiva/sclera: Conjunctivae normal.   Cardiovascular:      Rate and Rhythm: Normal rate and regular rhythm.      Heart sounds: No murmur heard.  Pulmonary:      Effort: Pulmonary effort is normal. No respiratory distress.      Breath sounds: Normal breath sounds.   Abdominal:      Palpations: Abdomen is soft.      Tenderness: There is abdominal tenderness (right upper qudrant).   Musculoskeletal:         General: No swelling.      Cervical back: Neck supple.   Skin:     General: Skin is warm and dry.      Capillary Refill: Capillary refill takes less than 2 seconds.   Neurological:      Mental Status: She is alert and oriented to person, place, and time.   Psychiatric:         Mood and Affect: Mood normal.           Lines/Drains:              Lab Results: I have reviewed the following results:   Results from last 7 days   Lab Units 05/03/25  0556   WBC Thousand/uL 2.22*   HEMOGLOBIN g/dL 8.3*   HEMATOCRIT % 27.5*   PLATELETS Thousands/uL 69*   SEGS PCT % 55   LYMPHO PCT % 31   MONO PCT % 9   EOS PCT % 4     Results from last 7 days   Lab Units 05/03/25  0556   SODIUM mmol/L 138   POTASSIUM mmol/L 4.7   CHLORIDE mmol/L 104   CO2 mmol/L 27   BUN mg/dL 28*   CREATININE mg/dL 1.96*   ANION GAP mmol/L 7   CALCIUM mg/dL 8.6   ALBUMIN g/dL 2.9*   TOTAL BILIRUBIN mg/dL 0.88   ALK PHOS U/L 170*   ALT U/L 14   AST U/L 43*   GLUCOSE RANDOM mg/dL 91     Results from last 7 days    Lab Units 05/02/25  0854   INR  1.27*             Results from last 7 days   Lab Units 05/02/25  1122 05/02/25  0854   LACTIC ACID mmol/L  --  1.4   PROCALCITONIN ng/ml 0.10  --        Recent Cultures (last 7 days):         Imaging Results Review: No pertinent imaging studies reviewed.  Other Study Results Review: No additional pertinent studies reviewed.    Last 24 Hours Medication List:     Current Facility-Administered Medications:     albumin human (FLEXBUMIN) 25 % injection 25 g, Q6H    amitriptyline (ELAVIL) tablet 50 mg, HS    Artificial Tears Op Soln 1 drop, Q4H PRN    dapsone tablet 50 mg, Daily    lactulose (CHRONULAC) oral solution 30 g, Q2H    lidocaine (LIDODERM) 5 % patch 1 patch, Daily    midodrine (PROAMATINE) tablet 10 mg, TID AC    multi-electrolyte (Plasmalyte-A/Isolyte-S PH 7.4/Normosol-R) IV solution, Continuous, Last Rate: 50 mL/hr (05/03/25 0948)    oxyCODONE (ROXICODONE) immediate release tablet 10 mg, Q6H PRN    pantoprazole (PROTONIX) EC tablet 40 mg, Early Morning    rifaximin (XIFAXAN) tablet 550 mg, Q12H MIGUEL    ursodiol (ACTIGALL) capsule 300 mg, BID    Administrative Statements   Today, Patient Was Seen By: Maryjane Whatley MD  I have spent a total time of >35 minutes in caring for this patient on the day of the visit/encounter including Patient and family education, Documenting in the medical record, Reviewing/placing orders in the medical record (including tests, medications, and/or procedures), and Communicating with other healthcare professionals .    **Please Note: This note may have been constructed using a voice recognition system.**

## 2025-05-03 NOTE — OCCUPATIONAL THERAPY NOTE
Occupational Therapy Evaluation     Patient Name: Bailey Olsen  Today's Date: 5/3/2025  Problem List  Principal Problem:    Falls  Active Problems:    Liver cirrhosis secondary to HYATT (nonalcoholic steatohepatitis) (HCC)    Pancytopenia (HCC)    Chronic, continuous use of opioids    Chest pain    Abdominal pain    Acute kidney injury (HCC)    Past Medical History  Past Medical History:   Diagnosis Date    Back pain     Carcinoid tumor     neuroendocrine    CHF (congestive heart failure) (HCC)     Cirrhosis of liver (HCC)     Hypertension     Sarcoidosis      Past Surgical History  Past Surgical History:   Procedure Laterality Date    CHOLECYSTECTOMY      GASTRIC BYPASS      HERNIA REPAIR      IR KYPHOPLASTY/VERTEBROPLASTY  10/15/2024         05/03/25 1102   OT Last Visit   OT Visit Date 05/03/25   Note Type   Note type Evaluation   Pain Assessment   Pain Assessment Tool 0-10   Pain Score No Pain   Restrictions/Precautions   Weight Bearing Precautions Per Order No   Other Precautions Pain;Fall Risk;Bed Alarm;Chair Alarm;Multiple lines   Home Living   Type of Home House   Home Layout Two level;Able to live on main level with bedroom/bathroom;Performs ADLs on one level;Stairs to enter without rails   Bathroom Shower/Tub Tub/shower unit   Bathroom Toilet Raised   Bathroom Equipment Grab bars in shower;Grab bars around toilet;Commode   Home Equipment Walker;Cane   Additional Comments Pt resides with spouse and daughter in a 2SH with 1 FABRICIO.   Prior Function   Level of [place] Independent with ADLs;Independent with functional mobility   Lives With Spouse;Daughter   Receives Help From Family   IADLs Independent with meal prep;Independent with medication management;Family/Friend/Other provides transportation   Falls in the last 6 months 0   Vocational Retired   Comments PTA, Pt reports being I with ADLs (spouse supervises showers)/functional mobility with RW when she is not feeling well/I with IADLs/family  provides transportation.   Lifestyle   Autonomy I with ADLs (spouse supervises showers)/functional mobility with RW when she is not feeling well/I with IADLs/family provides transportation   Reciprocal Relationships Supportive spouse   Service to Others Retired   Intrinsic Gratification Enjoys traveling   General   Additional Pertinent History Pt with frequent hospitalizations over the past year.   ADL   Where Assessed Chair   Eating Assistance 7  Independent   Grooming Assistance 5  Supervision/Setup   Grooming Deficit Setup;Wash/dry hands  (standing with use of basin)   UB Bathing Assistance 5  Supervision/Setup   UB Bathing Deficit Setup   LB Bathing Assistance 3  Moderate Assistance   LB Bathing Deficit Increased time to complete;Supervision/safety;Buttocks  (seated/standing)   UB Dressing Assistance 4  Minimal Assistance   UB Dressing Deficit Setup;Supervision/safety;Increased time to complete  (gown)   LB Dressing Assistance 3  Moderate Assistance   Toileting Assistance  3  Moderate Assistance   Functional Assistance 4  Minimal Assistance   Functional Deficit Setup;Increased time to complete;Supervision/safety   Additional Comments Pt MCCOLLUM with ADLs this AM and provided mod assist with LB bathing in standing due to decreased balance & standing tolerance. Pt requires significant assist with ayden/bowel hygiene 2* to pannus/folds.   Bed Mobility   Supine to Sit Unable to assess   Sit to Supine Unable to assess   Additional Comments Pt greeted supine in bed.   Transfers   Sit to Stand 4  Minimal assistance   Additional items Increased time required;Verbal cues;Assist x 1   Stand to Sit 4  Minimal assistance   Additional items Assist x 1;Increased time required;Verbal cues   Additional Comments with RW   Functional Mobility   Functional Mobility 4  Minimal assistance   Additional Comments Min Ax1 with RW   Additional items Rolling walker   Balance   Static Sitting Good   Dynamic Sitting Fair +   Static Standing Fair    Dynamic Standing Fair -   Ambulatory Poor +   Activity Tolerance   Activity Tolerance Patient limited by fatigue   Medical Staff Made Aware CM updated.   Nurse Made Aware RN cleared/updated.   RUE Assessment   RUE Assessment WFL   LUE Assessment   LUE Assessment WFL   Hand Function   Gross Motor Coordination Functional   Fine Motor Coordination Functional   Sensation   Light Touch No apparent deficits   Psychosocial   Psychosocial (WDL) X   Patient Behaviors/Mood Flat affect;Brightens with approach;Calm   Cognition   Overall Cognitive Status WFL   Arousal/Participation Alert;Cooperative   Attention Within functional limits   Orientation Level Oriented X4   Memory Decreased recall of precautions;Decreased short term memory  (c/o STM difficulties)   Following Commands Follows one step commands without difficulty   Comments Pt presents with flat affect, however, pleasant and cooperative during OT session. Pt may be self-limiting at times. Pt self-reports forgetfulness.   Assessment   Limitation Decreased ADL status;Decreased UE ROM;Decreased UE strength;Decreased Safe judgement during ADL;Decreased cognition;Decreased endurance;Decreased high-level ADLs;Decreased self-care trans   Prognosis Fair   Assessment Pt is a 54 y.o. female who presented to [facility] with R sided chest pain and frequent falls. Pt with diagnosis of falls. Pt  has a past medical history of Back pain, Carcinoid tumor, CHF (congestive heart failure) (HCC), Cirrhosis of liver (HCC), Hypertension, and Sarcoidosis. Pt greeted up in chair for OT evaluation on 05/03/25. Pt resides with spouse and daughter in a 2SH with 1 FABRICIO. PTA, Pt reports being I with ADLs (spouse supervises showers)/functional mobility with RW when she is not feeling well/I with IADLs/family provides transportation. Pt demonstrating the following occupational performance levels: Min A with UB ADLs, Mod A  with LB ADLs, Min A with functional transfers, and Min A with  functional mobility with RW. Limitations that impact functional performance include decreased ADL status, UE ROM, UE strength, safe judgement during ADLs, cognition, endurance, self care transfers, and high level ADLs. Occupational performance areas to address ADL retraining, functional transfer training, UE strengthening/ROM, endurance training, cognitive reorientation, Pt/caregiver education, equipment evaluation/education, compensatory technique education, energy conservation, and activity engagement . Pt would benefit from continued skilled OT services while in hospital to maximize independence with ADLs. Will continue to follow Pt's progress. Pt would benefit from level II resources (moderate intensity) upon DC to maximize safety and independence with ADLs and functional tasks of choice.   Goals   Patient Goals To get better   LTG Time Frame 10-14   Long Term Goal #1 See goals listed below   Plan   Treatment Interventions ADL retraining;Endurance training;Functional transfer training;UE strengthening/ROM;Cognitive reorientation;Patient/family training;Equipment evaluation/education;Compensatory technique education;Energy conservation;Activityengagement   Goal Expiration Date 05/17/25   OT Frequency 3-5x/wk   Discharge Recommendation   Rehab Resource Intensity Level, OT II (Moderate Resource Intensity)   Additional Comments  The patient's raw score on the AM-PAC Daily Activity Inpatient Short Form is 19. A raw score of greater than or equal to 19 suggests the patient may benefit from discharge to home. Please refer to the recommendation of the Occupational Therapist for safe discharge planning.   AM-PAC Daily Activity Inpatient   Lower Body Dressing 2   Bathing 2   Toileting 2   Upper Body Dressing 3   Grooming 3   Eating 4   Daily Activity Raw Score 19   Daily Activity Standardized Score (Calc for Raw Score >=11) 35.96   AM-PAC Applied Cognition Inpatient   Following a Speech/Presentation 4   Understanding  Ordinary Conversation 4   Taking Medications 4   Remembering Where Things Are Placed or Put Away 4   Remembering List of 4-5 Errands 3   Taking Care of Complicated Tasks 3   Applied Cognition Raw Score 22   Applied Cognition Standardized Score 47.83   End of Consult   Education Provided Yes   Patient Position at End of Consult Bedside chair;Bed/Chair alarm activated;All needs within reach   Nurse Communication Nurse aware of consult             GOALS:  Pt will complete UB ADLs with I in order to maximize participation with ADLs.   Pt will complete LB ADLs with I in order to maximize safety with ADLs.   Pt will complete toileting routine (transfer, hygiene, and clothing management) with I in order to return to prior level of function.  Pt will complete bed mobility with I in order to maximize participation with ADLs.   Pt will complete functional transfers at I level in order to increase participation with ADLs.  Pt will increase dynamic standing balance to F+ in order to increase safety with ADLs.  Pt will increase standing tolerance x10 min in order to increase participation with ADLs.   Pt will complete functional mobility with AD PRN for item retrieval task at [place] level in order to increase participation with ADLs.  Pt will complete IADL tasks/simulation of IADLs tasks with I in order to return to PLOF.  Pt will demonstrate G energy conservation techniques with ADLs/IADLs in order to reduce the risk of falls.  Pt will be attentive 100% of the time for ongoing functional/formal cognitive assessment to assist with safe dc planning prn.    Hailee Lord MS, OTR/L

## 2025-05-03 NOTE — ASSESSMENT & PLAN NOTE
Potential etiologies include volume depletion vs ATN vs HRS.  Cr baseline 0.9-1.1.   Cr 1.68 on admission and 1.96 today.   UA bland.  CT 5/2 no obstruction     Recommend:   Continue fluid trial but decrease plasmalyte to 50ml/hr, monitor volume status closely. Appears euvolemic at present.   Continue albumin as ordered for 48 hours.  Hold diuretic.  Check urine studies and uric acid.  Monitor I/P.   Hold coreg.   Agree with midodrine 10mg TID,reports she was taking at home?   Reviewed potential etiologies and management plan with patient.

## 2025-05-03 NOTE — ASSESSMENT & PLAN NOTE
Potentially due to volume depletion in part with diet restriction and diuretics.   Continue volume trial.

## 2025-05-03 NOTE — PLAN OF CARE
Problem: Potential for Falls  Goal: Patient will remain free of falls  Description: INTERVENTIONS:- Educate patient/family on patient safety including physical limitations- Instruct patient to call for assistance with activity - Consult OT/PT to assist with strengthening/mobility - Keep Call bell within reach- Keep bed low and locked with side rails adjusted as appropriate- Keep care items and personal belongings within reach- Initiate and maintain comfort rounds- Make Fall Risk Sign visible to staff- Offer Toileting every 3 Hours, in advance of need- Initiate/Maintain bed alarm- Obtain necessary fall risk management equipment: yellow socks - Apply yellow socks and bracelet for high fall risk patients- Consider moving patient to room near nurses station  INTERVENTIONS:- Educate patient/family on patient safety including physical limitations- Instruct patient to call for assistance with activity - Consult OT/PT to assist with strengthening/mobility - Keep Call bell within reach- Keep bed low and locked with side rails adjusted as appropriate- Keep care items and personal belongings within reach- Initiate and maintain comfort rounds- Make Fall Risk Sign visible to staff- Offer Toileting every 3 Hours, in advance of need- Initiate/Maintain bed alarm- Obtain necessary fall risk management equipment: yellow socks - Apply yellow socks and bracelet for high fall risk patients- Consider moving patient to room near nurses station  Outcome: Progressing     Problem: Prexisting or High Potential for Compromised Skin Integrity  Goal: Skin integrity is maintained or improved  Description: INTERVENTIONS:- Identify patients at risk for skin breakdown- Assess and monitor skin integrity- Assess and monitor nutrition and hydration status- Monitor labs - Assess for incontinence - Turn and reposition patient- Assist with mobility/ambulation- Relieve pressure over bony prominences- Avoid friction and shearing- Provide appropriate  hygiene as needed including keeping skin clean and dry- Evaluate need for skin moisturizer/barrier cream- Collaborate with interdisciplinary team - Patient/family teaching- Consider wound care consult   Outcome: Progressing     Problem: PAIN - ADULT  Goal: Verbalizes/displays adequate comfort level or baseline comfort level  Description: Interventions:- Encourage patient to monitor pain and request assistance- Assess pain using appropriate pain scale- Administer analgesics based on type and severity of pain and evaluate response- Implement non-pharmacological measures as appropriate and evaluate response- Consider cultural and social influences on pain and pain management- Notify physician/advanced practitioner if interventions unsuccessful or patient reports new pain  Outcome: Progressing     Problem: INFECTION - ADULT  Goal: Absence or prevention of progression during hospitalization  Description: INTERVENTIONS:- Assess and monitor for signs and symptoms of infection- Monitor lab/diagnostic results- Monitor all insertion sites, i.e. indwelling lines, tubes, and drains- Monitor endotracheal if appropriate and nasal secretions for changes in amount and color- [place] appropriate cooling/warming therapies per order- Administer medications as ordered- Instruct and encourage patient and family to use good hand hygiene technique- Identify and instruct in appropriate isolation precautions for identified infection/condition  Outcome: Progressing  Goal: Absence of fever/infection during neutropenic period  Description: INTERVENTIONS:- Monitor WBC  Outcome: Progressing     Problem: SAFETY ADULT  Goal: Patient will remain free of falls  Description: INTERVENTIONS:- Educate patient/family on patient safety including physical limitations- Instruct patient to call for assistance with activity - Consult OT/PT to assist with strengthening/mobility - Keep Call bell within reach- Keep bed low and locked with side rails adjusted  as appropriate- Keep care items and personal belongings within reach- Initiate and maintain comfort rounds- Make Fall Risk Sign visible to staff- Offer Toileting every 3 Hours, in advance of need- Initiate/Maintain bed alarm- Obtain necessary fall risk management equipment: yellow socks - Apply yellow socks and bracelet for high fall risk patients- Consider moving patient to room near nurses station  INTERVENTIONS:- Educate patient/family on patient safety including physical limitations- Instruct patient to call for assistance with activity - Consult OT/PT to assist with strengthening/mobility - Keep Call bell within reach- Keep bed low and locked with side rails adjusted as appropriate- Keep care items and personal belongings within reach- Initiate and maintain comfort rounds- Make Fall Risk Sign visible to staff- Offer Toileting every 3 Hours, in advance of need- Initiate/Maintain bed alarm- Obtain necessary fall risk management equipment: yellow socks - Apply yellow socks and bracelet for high fall risk patients- Consider moving patient to room near nurses station  Outcome: Progressing  Goal: Maintain or return to baseline ADL function  Description: INTERVENTIONS:-  Assess patient's ability to carry out ADLs; assess patient's baseline for ADL function and identify physical deficits which impact ability to perform ADLs (bathing, care of mouth/teeth, toileting, grooming, dressing, etc.)- Assess/evaluate cause of self-care deficits - Assess range of motion- Assess patient's mobility; develop plan if impaired- Assess patient's need for assistive devices and provide as appropriate- Encourage maximum independence but intervene and supervise when necessary- Involve family in performance of ADLs- Assess for home care needs following discharge - Consider OT consult to assist with ADL evaluation and planning for discharge- Provide patient education as appropriate  Outcome: Progressing  Goal: Maintains/Returns to pre  admission functional level  Description: INTERVENTIONS:- Perform AM-PAC 6 Click Basic Mobility/ Daily Activity assessment daily.- Set and communicate daily mobility goal to care team and patient/family/caregiver. - Collaborate with rehabilitation services on mobility goals if consulted- Perform Range of Motion 3 times a day.- Reposition patient every 2 hours.- Dangle patient 3 times a day- Stand patient 3 times a day- Ambulate patient 3 times a day- Out of bed to chair 3 times a day - Out of bed for meals 3 times a day- Out of bed for toileting- Record patient progress and toleration of activity level   Outcome: Progressing     Problem: DISCHARGE PLANNING  Goal: Discharge to home or other facility with appropriate resources  Description: INTERVENTIONS:- Identify barriers to discharge w/patient and caregiver- Arrange for needed discharge resources and transportation as appropriate- Identify discharge learning needs (meds, wound care, etc.)- Arrange for interpretive services to assist at discharge as needed- Refer to Case Management Department for coordinating discharge planning if the patient needs post-hospital services based on physician/advanced practitioner order or complex needs related to functional status, cognitive ability, or social support system  Outcome: Progressing     Problem: Knowledge Deficit  Goal: Patient/family/caregiver demonstrates understanding of disease process, treatment plan, medications, and discharge instructions  Description: Complete learning assessment and assess knowledge base.Interventions:- Provide teaching at level of understanding- Provide teaching via preferred learning methods  Outcome: Progressing

## 2025-05-03 NOTE — PLAN OF CARE
Problem: PAIN - ADULT  Goal: Verbalizes/displays adequate comfort level or baseline comfort level  Description: Interventions:- Encourage patient to monitor pain and request assistance- Assess pain using appropriate pain scale- 0-10Administer analgesics based on type and severity of pain and evaluate response- Implement non-pharmacological measures as appropriate and evaluate response- Consider cultural and social influences on pain and pain management- Notify physician/advanced practitioner if interventions unsuccessful or patient reports new pain  Outcome: Progressing

## 2025-05-03 NOTE — CASE MANAGEMENT
Case Management Assessment & Discharge Planning Note    Patient name Bailey Olsen  Location /-01 MRN [id number]  : 1970 Date 5/3/2025       Current Admission Date: 2025  Current Admission Diagnosis:Falls   Patient Active Problem List    Diagnosis Date Noted Date Diagnosed    Falls 2025     Chest pain 2025     Abdominal pain 2025     Acute kidney injury (HCC) 2025     Gram-negative bacteremia 2025     Acute respiratory failure (HCC) 2025     Carcinoid tumor of pancreas 2025     Hypomagnesemia 2025     Upper GI bleed 2025     Acute anemia 2025     Rectal bleeding 2025     Acute blood loss anemia 2025     Chronic, continuous use of opioids 2025     Acute metabolic encephalopathy 2025     SIRS (systemic inflammatory response syndrome) (HCC) 2025     Sacral fracture, closed (HCC) 11/15/2024     KISHORE (acute kidney injury) (HCC) 10/04/2024     Morbid obesity with BMI of 40.0-44.9, adult (HCC) 10/04/2024     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) 2024     Fall from standing 2024     Closed compression fracture of L1 lumbar vertebra, sequela 2024     Closed stable burst fracture of first lumbar vertebra with routine healing 2024     Groin hematoma 2024     Ambulatory dysfunction 2024     Intractable back pain 2024     Hyperammonemia (HCC) 2024     Hepatic encephalopathy (HCC) 2024     Fall 2024     Liver cirrhosis secondary to HYATT (nonalcoholic steatohepatitis) (HCC) 2024     History of malignant carcinoid tumor of small intestine 2024     Class 3 severe obesity due to excess calories in adult 2024     Primary hypertension 2024     Hypokalemia 2024     Fibromyalgia 2024     Iron deficiency anemia 2024     Pancytopenia (HCC) 2024     Sarcoidosis 2024     Bilateral  pulmonary infiltrates 05/26/2024       LOS (days): 0  Geometric Mean LOS (GMLOS) (days):   Days to GMLOS:     OBJECTIVE:    Risk of Unplanned Readmission Score: 69.61         Current admission status: Inpatient       Preferred Pharmacy:   [id number] - [place] - [address]  [address] [address] [facility] [place] [address]  Phone: [phone] Fax: [phone]    [id number] - [place] - [address]  [address] [address]  Phone: [phone] Fax: [phone]    Primary Care Provider: Nayely Brown DO    Primary Insurance: BLUE CROSS  Secondary Insurance:     ASSESSMENT:  Active Health Care Proxies       Chris Olsenic Health Care Representative - Spouse   Primary Phone: [phone] (Mobile)                 Advance Directives  Does patient have a Health Care POA?: No  Was patient offered paperwork?: Yes (declined)  Does patient currently have a Health Care decision maker?: Yes, please see Health Care Proxy section (yanet, )  Does patient have Advance Directives?: No  Was patient offered paperwork?: Yes (declined)  Primary Contact: , yanet              Patient Information  Admitted from:: Home  Mental Status: Alert  During Assessment patient was accompanied by: Spouse  Assessment information provided by:: Patient  Primary Caregiver: Self  Support Systems: Spouse/significant other, Children, Friends/neighbors, Family members  County [place] Residence: [place]  Home entry access options. Select all that apply.: Stairs  Number of steps to enter home.: 1  Type of Current Residence: 2 story home  Upon entering residence, is there a bedroom on the main floor (no further steps)?: Yes  Upon entering residence, is there a bathroom on the main floor (no further steps)?: Yes  Living Arrangements: Lives w/ Spouse/significant other, Lives w/ Daughter  Is patient a ?: No    Activities of Daily Living Prior to Admission  Functional  "Status: Assistance  Completes ADLs independently?: No  Level of ADL dependence: Assistance  Ambulates independently?: Yes  Does patient use assisted devices?: Yes  Assisted Devices (DME) used: Walker, Straight Cane, Bedside Commode  Does patient currently own DME?: Yes  What DME does the patient currently own?: Bedside Commode, Straight Cane, Walker  Does patient have a history of Outpatient Therapy (PT/OT)?: No  Does the patient have a history of Short-Term Rehab?: Yes ([facility], [facility])  Does patient have a history of HHC?: Yes ([facility])  Does patient currently have HHC?: No         Patient Information Continued  Income Source: SSI/SSD  Does patient have prescription coverage?: Yes  Can the patient afford their medications and any related supplies (such as glucometers or test strips)?: Yes  Does patient receive dialysis treatments?: No  Does patient have a history of substance abuse?: No  Does patient have a history of Mental Health Diagnosis?: No         Means of Transportation  Means of Transport to [facility]:: Family transport          DISCHARGE DETAILS:    Discharge planning discussed with:: patient and hisband, who is at bedside  [place] of Choice: Yes  Comments - Freedom of Choice: STR, pt wants to \"think\" about it, had \"bad\" experience with str in the past  CM contacted family/caregiver?: Yes ( at bedside)  Were Treatment Team discharge recommendations reviewed with patient/caregiver?: Yes  Did patient/caregiver verbalize understanding of patient care needs?: Yes  Were patient/caregiver advised of the risks associated with not following Treatment Team discharge recommendations?: Yes    Contacts  Patient Contacts: Cristian Olsen- spouse  Relationship to Patient:: Family  Contact Method: In Person  Reason/Outcome: Discharge Planning                   Would you like to participate in our [facility] service program?  : No - Declined           Pt is from home, lives with  " "and daughter in a 2SH with 1 FABRICIO.  Pt requires assist with ADL's.  Pt ambulates independently with a walker.       STR has been recommended for pt at time of discharge, CM placing blanket referrals in [facility]     CM discussing choices of STR from [facility] list, pt sts she is unsure about rehab and would \"like to think about it.\"  CM to revisit.                                                      "

## 2025-05-03 NOTE — CONSULTS
NEPHROLOGY HOSPITAL CONSULTATION   Bailey Olsen 54 y.o. female MRN: [id number]  Unit/Bed#: -01 Encounter: [id number]      Assessment & Plan  Acute kidney injury (HCC)  Potential etiologies include volume depletion vs ATN vs HRS.  Cr baseline 0.9-1.1.   Cr 1.68 on admission and 1.96 today.   UA bland.  CT 5/2 no obstruction     Recommend:   Continue fluid trial but decrease plasmalyte to 50ml/hr, monitor volume status closely. Appears euvolemic at present.   Continue albumin as ordered for 48 hours.  Hold diuretic.  Check urine studies and uric acid.  Monitor I/P.   Hold coreg.   Agree with midodrine 10mg TID,reports she was taking at home?   Reviewed potential etiologies and management plan with patient.   Falls  Potentially due to volume depletion in part with diet restriction and diuretics.   Continue volume trial.   Liver cirrhosis secondary to HYATT (nonalcoholic steatohepatitis) (HCC)  Hold diuretics, continue lactulose.   Continue albumin in setting of KISHORE.   Pancytopenia (HCC)  Suspect 2/2 cirrhosis in part.   Monitor cbc  Chest pain  Management per primary, potentially due to subacute tenth rib fracture.   Abdominal pain  Management per primary     HISTORY OF PRESENT ILLNESS:  Requesting Physician: Maryjane Whatley MD      Bailey Olsen is a 54 y.o. female who was admitted to [facility] for concern sp fall.She rpeorts dysequilibrium for the past couple days. She apparently had rib pain and pain with inspiration. She was noted to have a hypotensive event last night 71/42 and borderline BP this AM with MAP 61. Denies changes to BM, she takes lactulose chronically-3 BM a day and denies vomiting.   She reports eating less with trying to lose weight.   She reports variable uriination depneding on diuretic--torsemide 20mg BID and spirinolactone 25mg/day. She does not take atenolol ,she takes coreg. Also takes midodrine 10mg TID. Does not monitor BP at home.    No infection concerns prior to admission.   Denies  NSAID use. Denies changes to LUTS.         PAST MEDICAL HISTORY:  Past Medical History:   Diagnosis Date    Back pain     Carcinoid tumor     neuroendocrine    CHF (congestive heart failure) (HCC)     Cirrhosis of liver (HCC)     Hypertension     Sarcoidosis        PAST SURGICAL HISTORY:  Past Surgical History:   Procedure Laterality Date    CHOLECYSTECTOMY      GASTRIC BYPASS      HERNIA REPAIR      IR KYPHOPLASTY/VERTEBROPLASTY  10/15/2024       ALLERGIES:  Allergies   Allergen Reactions    Zolpidem GI Intolerance, Other (See Comments) and Hives     Other Reaction(s): Other      Blacks out    Blacks out    Tapentadol Sneezing and Other (See Comments)     Zoned out    Alprazolam Other (See Comments)     Other Reaction(s): Other      Feels zoned out    Feels zoned out    Amoxicillin-Pot Clavulanate Hives and Nausea Only     Amoxil fine    Dust Mite Extract Sneezing    Morphine Headache, Nausea Only and Vomiting    Tramadol GI Intolerance    Azelastine Rash    Azelastine Hcl Rash    Molds & Smuts Rash     Other Reaction(s): Unknown       SOCIAL HISTORY:  Social History     Substance and Sexual Activity   Alcohol Use Never     Social History     Substance and Sexual Activity   Drug Use Never     Social History     Tobacco Use   Smoking Status Never   Smokeless Tobacco Never       FAMILY HISTORY:  Family History   Problem Relation Age of Onset    Hypertension Father     Heart attack Father     No Known Problems Sister     No Known Problems Brother     No Known Problems Brother     No Known Problems Brother     No Known Problems Brother     No Known Problems Brother     No Known Problems Daughter        MEDICATIONS:    Current Facility-Administered Medications:     albumin human (FLEXBUMIN) 25 % injection 25 g, 25 g, Intravenous, Q6H, Maryjane Whatley MD, 25 g at 05/03/25 0800    amitriptyline (ELAVIL) tablet 50 mg, 50 mg, Oral, HS, Maryjane Whatley MD, 50 mg at 05/02/25 2133    Artificial Tears Op Soln 1 drop, 1 drop,  Both Eyes, Q4H PRN, [place] Jonathan-Aide, CRNP    dapsone tablet 50 mg, 50 mg, Oral, Daily, Maryjane Whatley MD, 50 mg at 05/03/25 0755    lactulose (CHRONULAC) oral solution 30 g, 30 g, Oral, Q2H, Maryjane Whatley MD    lidocaine (LIDODERM) 5 % patch 1 patch, 1 patch, Topical, Daily, Maryjane Whatley MD, 1 patch at 05/02/25 1401    midodrine (PROAMATINE) tablet 10 mg, 10 mg, Oral, TID AC, Maryjane Whatley MD, 10 mg at 05/03/25 0604    multi-electrolyte (Plasmalyte-A/Isolyte-S PH 7.4/Normosol-R) IV solution, 100 mL/hr, Intravenous, Continuous, Maryjane Whatley MD, Last Rate: 100 mL/hr at 05/03/25 0804, 100 mL/hr at 05/03/25 0804    oxyCODONE (ROXICODONE) immediate release tablet 10 mg, 10 mg, Oral, Q6H PRN, Maryjane Whatley MD, 10 mg at 05/03/25 0752    pantoprazole (PROTONIX) EC tablet 40 mg, 40 mg, Oral, Early Morning, Maryjane Whatley MD, 40 mg at 05/03/25 0604    rifaximin (XIFAXAN) tablet 550 mg, 550 mg, Oral, Q12H MIGUEL, Maryjane Whatley MD, 550 mg at 05/03/25 0030    sertraline (ZOLOFT) tablet 100 mg, 100 mg, Oral, Daily, Maryjane Whatley MD, 100 mg at 05/03/25 0754    ursodiol (ACTIGALL) capsule 300 mg, 300 mg, Oral, BID, Maryjane Whatley MD, 300 mg at 05/03/25 0755    REVIEW OF SYSTEMS:  Constitutional: Negative for fatigue, anorexia, fever, chills, diaphoresis  HENT: Negative for postnasal drip  Eyes: Negative for visual disturbance.   Respiratory: Negative for cough, shortness of breath and wheezing.   Cardiovascular: Negative for chest pain, palpitations and leg swelling.   Gastrointestinal: Negative for abdominal pain, constipation, diarrhea, nausea and vomiting.   Genitourinary: No dysuria, hematuria  Endocrine: Negative for polyuria.   Musculoskeletal: Negative for arthralgias, back pain and joint swelling.   Skin: Negative for rash.   Neurological: Negative for focal weakness, headaches, dizziness.  Hematological: Negative for easy bruising or bleeding.  Psychiatric/Behavioral: Negative for confusion and  sleep disturbance.   All the systems were reviewed and were negative except as documented on the HPI.    PHYSICAL EXAM:  Current Weight: Weight - Scale: 117 kg (258 lb 13.1 oz)  First Weight: Weight - Scale: 116 kg (255 lb 4.7 oz)  Vitals:    05/03/25 0034 05/03/25 0215 05/03/25 0602 05/03/25 0740   BP: 102/56 100/57 (!) 94/48 (!) 92/46   BP Location:       Pulse: 79 82 73 78   Resp:    20   Temp:    (!) 97 °F (36.1 °C)   TempSrc:       SpO2: 92% 91% 94% 96%   Weight:       Height:           Intake/Output Summary (Last 24 hours) at 5/3/2025 0813  Last data filed at 5/3/2025 0610  Gross per 24 hour   Intake 720 ml   Output 300 ml   Net 420 ml     Physical Exam  Vitals reviewed.   Constitutional:       General: She is not in acute distress.     Appearance: She is ill-appearing.   HENT:      Head: Normocephalic and atraumatic.      Nose: Nose normal. No congestion.   Eyes:      Conjunctiva/sclera: Conjunctivae normal.   Cardiovascular:      Rate and Rhythm: Normal rate and regular rhythm.      Heart sounds:      No friction rub.   Pulmonary:      Breath sounds: Normal breath sounds. No wheezing or rales.   Abdominal:      Palpations: Abdomen is soft.      Tenderness: There is no abdominal tenderness. There is no guarding.   Musculoskeletal:      Right lower leg: No edema.      Left lower leg: No edema.   Skin:     Coloration: Skin is not jaundiced.      Findings: No bruising.   Neurological:      General: No focal deficit present.      Mental Status: She is alert and oriented to person, place, and time.      Cranial Nerves: No cranial nerve deficit.   Psychiatric:         Mood and Affect: Mood normal.         Behavior: Behavior normal.           Invasive Devices:      Lab Results:   Results from last 7 days   Lab Units 05/03/25  0556 05/02/25  0854   WBC Thousand/uL 2.22* 2.28*   HEMOGLOBIN g/dL 8.3* 8.8*   HEMATOCRIT % 27.5* 28.5*   PLATELETS Thousands/uL 69* 69*   POTASSIUM mmol/L 4.7 4.4   CHLORIDE mmol/L 104 100  "  CO2 mmol/L 27 30   BUN mg/dL 28* 28*   CREATININE mg/dL 1.96* 1.68*   CALCIUM mg/dL 8.6 8.7   MAGNESIUM mg/dL  --  2.0   ALK PHOS U/L 170* 191*   ALT U/L 14 17   AST U/L 43* 40*       Portions of the record may have been created with voice recognition software. Occasional wrong word or \"sound a like\" substitutions may have occurred due to the inherent limitations of voice recognition software. Read the chart carefully and recognize, using context, where substitutions have occurred.If you have any questions, please contact the dictating provider.    "

## 2025-05-03 NOTE — ASSESSMENT & PLAN NOTE
With hyperammonemia but there is no hepatic encephalopathy-tremors , only had 2 bowel movements yesterday ammonia increased today to about 100 will increase lactulose to 30 g every 2 hours aim for 4-5 bowel movements a day hopefully that could be done so could repeat ammonia later on  Hold diuretics secondary to KISHORE   Continue Xifaxan

## 2025-05-04 LAB
ALBUMIN SERPL BCG-MCNC: 3.7 G/DL (ref 3.5–5)
ALP SERPL-CCNC: 182 U/L (ref 34–104)
ALT SERPL W P-5'-P-CCNC: 16 U/L (ref 7–52)
AMMONIA PLAS-SCNC: 56 UMOL/L (ref 18–72)
ANION GAP SERPL CALCULATED.3IONS-SCNC: 4 MMOL/L (ref 4–13)
AST SERPL W P-5'-P-CCNC: 40 U/L (ref 13–39)
BASOPHILS # BLD AUTO: 0.04 THOUSANDS/ÂΜL (ref 0–0.1)
BASOPHILS NFR BLD AUTO: 2 % (ref 0–1)
BILIRUB SERPL-MCNC: 0.94 MG/DL (ref 0.2–1)
BUN SERPL-MCNC: 24 MG/DL (ref 5–25)
CALCIUM SERPL-MCNC: 9.1 MG/DL (ref 8.4–10.2)
CHLORIDE SERPL-SCNC: 103 MMOL/L (ref 96–108)
CO2 SERPL-SCNC: 29 MMOL/L (ref 21–32)
CREAT SERPL-MCNC: 1.33 MG/DL (ref 0.6–1.3)
CREAT UR-MCNC: 64.7 MG/DL
EOSINOPHIL # BLD AUTO: 0.11 THOUSAND/ÂΜL (ref 0–0.61)
EOSINOPHIL NFR BLD AUTO: 5 % (ref 0–6)
ERYTHROCYTE [DISTWIDTH] IN BLOOD BY AUTOMATED COUNT: 16.4 % (ref 11.6–15.1)
GFR SERPL CREATININE-BSD FRML MDRD: 45 ML/MIN/1.73SQ M
GLUCOSE SERPL-MCNC: 91 MG/DL (ref 65–140)
HCT VFR BLD AUTO: 28.1 % (ref 34.8–46.1)
HGB BLD-MCNC: 8.4 G/DL (ref 11.5–15.4)
IMM GRANULOCYTES # BLD AUTO: 0.01 THOUSAND/UL (ref 0–0.2)
IMM GRANULOCYTES NFR BLD AUTO: 0 % (ref 0–2)
LYMPHOCYTES # BLD AUTO: 0.71 THOUSANDS/ÂΜL (ref 0.6–4.47)
LYMPHOCYTES NFR BLD AUTO: 29 % (ref 14–44)
MCH RBC QN AUTO: 27.9 PG (ref 26.8–34.3)
MCHC RBC AUTO-ENTMCNC: 29.9 G/DL (ref 31.4–37.4)
MCV RBC AUTO: 93 FL (ref 82–98)
MONOCYTES # BLD AUTO: 0.16 THOUSAND/ÂΜL (ref 0.17–1.22)
MONOCYTES NFR BLD AUTO: 7 % (ref 4–12)
NEUTROPHILS # BLD AUTO: 1.42 THOUSANDS/ÂΜL (ref 1.85–7.62)
NEUTS SEG NFR BLD AUTO: 57 % (ref 43–75)
NRBC BLD AUTO-RTO: 0 /100 WBCS
PLATELET # BLD AUTO: 70 THOUSANDS/UL (ref 149–390)
PMV BLD AUTO: 10.6 FL (ref 8.9–12.7)
POTASSIUM SERPL-SCNC: 4.4 MMOL/L (ref 3.5–5.3)
PROT SERPL-MCNC: 7.8 G/DL (ref 6.4–8.4)
RBC # BLD AUTO: 3.01 MILLION/UL (ref 3.81–5.12)
SODIUM SERPL-SCNC: 136 MMOL/L (ref 135–147)
SODIUM UR-SCNC: 47 MMOL/L
WBC # BLD AUTO: 2.45 THOUSAND/UL (ref 4.31–10.16)

## 2025-05-04 PROCEDURE — 82140 ASSAY OF AMMONIA: CPT | Performed by: FAMILY MEDICINE

## 2025-05-04 PROCEDURE — 99232 SBSQ HOSP IP/OBS MODERATE 35: CPT | Performed by: INTERNAL MEDICINE

## 2025-05-04 PROCEDURE — 85025 COMPLETE CBC W/AUTO DIFF WBC: CPT | Performed by: FAMILY MEDICINE

## 2025-05-04 PROCEDURE — 80053 COMPREHEN METABOLIC PANEL: CPT | Performed by: FAMILY MEDICINE

## 2025-05-04 PROCEDURE — 84300 ASSAY OF URINE SODIUM: CPT | Performed by: INTERNAL MEDICINE

## 2025-05-04 PROCEDURE — 99232 SBSQ HOSP IP/OBS MODERATE 35: CPT | Performed by: FAMILY MEDICINE

## 2025-05-04 PROCEDURE — 82570 ASSAY OF URINE CREATININE: CPT | Performed by: INTERNAL MEDICINE

## 2025-05-04 RX ORDER — LACTULOSE 10 G/15ML
30 SOLUTION ORAL
Status: DISCONTINUED | OUTPATIENT
Start: 2025-05-04 | End: 2025-05-05

## 2025-05-04 RX ORDER — ALBUMIN (HUMAN) 12.5 G/50ML
25 SOLUTION INTRAVENOUS EVERY 6 HOURS
Status: DISCONTINUED | OUTPATIENT
Start: 2025-05-04 | End: 2025-05-04

## 2025-05-04 RX ORDER — ALBUMIN (HUMAN) 12.5 G/50ML
25 SOLUTION INTRAVENOUS EVERY 6 HOURS
Status: COMPLETED | OUTPATIENT
Start: 2025-05-04 | End: 2025-05-04

## 2025-05-04 RX ADMIN — LACTULOSE 30 G: 10 SOLUTION ORAL at 10:58

## 2025-05-04 RX ADMIN — AMITRIPTYLINE HYDROCHLORIDE 50 MG: 25 TABLET, FILM COATED ORAL at 22:25

## 2025-05-04 RX ADMIN — OXYCODONE HYDROCHLORIDE 10 MG: 10 TABLET ORAL at 22:25

## 2025-05-04 RX ADMIN — SODIUM CHLORIDE, SODIUM GLUCONATE, SODIUM ACETATE, POTASSIUM CHLORIDE, MAGNESIUM CHLORIDE, SODIUM PHOSPHATE, DIBASIC, AND POTASSIUM PHOSPHATE 50 ML/HR: .53; .5; .37; .037; .03; .012; .00082 INJECTION, SOLUTION INTRAVENOUS at 00:33

## 2025-05-04 RX ADMIN — OXYCODONE HYDROCHLORIDE 10 MG: 10 TABLET ORAL at 08:57

## 2025-05-04 RX ADMIN — LACTULOSE 30 G: 10 SOLUTION ORAL at 08:57

## 2025-05-04 RX ADMIN — LACTULOSE 30 G: 20 SOLUTION ORAL at 22:00

## 2025-05-04 RX ADMIN — ALBUMIN (HUMAN) 25 G: 0.25 INJECTION, SOLUTION INTRAVENOUS at 10:58

## 2025-05-04 RX ADMIN — ALBUMIN (HUMAN) 25 G: 0.25 INJECTION, SOLUTION INTRAVENOUS at 04:23

## 2025-05-04 RX ADMIN — MIDODRINE HYDROCHLORIDE 10 MG: 5 TABLET ORAL at 06:16

## 2025-05-04 RX ADMIN — DAPSONE 50 MG: 25 TABLET ORAL at 08:58

## 2025-05-04 RX ADMIN — LACTULOSE 30 G: 10 SOLUTION ORAL at 12:59

## 2025-05-04 RX ADMIN — URSODIOL 300 MG: 300 CAPSULE ORAL at 17:19

## 2025-05-04 RX ADMIN — MIDODRINE HYDROCHLORIDE 10 MG: 5 TABLET ORAL at 11:25

## 2025-05-04 RX ADMIN — URSODIOL 300 MG: 300 CAPSULE ORAL at 08:57

## 2025-05-04 RX ADMIN — LACTULOSE 30 G: 20 SOLUTION ORAL at 15:59

## 2025-05-04 RX ADMIN — ALBUMIN (HUMAN) 25 G: 0.25 INJECTION, SOLUTION INTRAVENOUS at 15:59

## 2025-05-04 RX ADMIN — MIDODRINE HYDROCHLORIDE 10 MG: 5 TABLET ORAL at 16:00

## 2025-05-04 RX ADMIN — LIDOCAINE 5% 1 PATCH: 700 PATCH TOPICAL at 14:44

## 2025-05-04 RX ADMIN — LACTULOSE 30 G: 20 SOLUTION ORAL at 17:19

## 2025-05-04 RX ADMIN — PANTOPRAZOLE SODIUM 40 MG: 40 TABLET, DELAYED RELEASE ORAL at 06:16

## 2025-05-04 RX ADMIN — OXYCODONE HYDROCHLORIDE 10 MG: 10 TABLET ORAL at 16:00

## 2025-05-04 RX ADMIN — RIFAXIMIN 550 MG: 550 TABLET ORAL at 10:58

## 2025-05-04 NOTE — ASSESSMENT & PLAN NOTE
Right side could be radiation down from the infiltration pneumonitis could be muscular skeletal as there is a little bruise on the right side she is postcholecystectomy no obvious abnormality on CT abdomen and pelvis could be radiation down from broken ribs will place with a Lidoderm patch.  And a heating pad  5/4 resolved on palpation no tenderness

## 2025-05-04 NOTE — ASSESSMENT & PLAN NOTE
Creatinine is 1.6 normal is 0.9-1 creatinine worsened on fluids today questionable HRS questionable ATN will ask nephrology to see continue fluids for now further adjustment per nephrology CT abdomen and pelvis did not reveal any obstruction.  Will start treating for HRS will add to midodrine will add albumin every 6 hours for 5 doses   Creatinine improved to 1.3 discussed with nephrology fluids discontinued restart her diuretics tomorrow finished of albumin today

## 2025-05-04 NOTE — PROGRESS NOTES
Progress Note - Nephrology   Name: Bailey Olsen 54 y.o. female I MRN: [id number]  Unit/Bed#: -01 I Date of Admission: 5/2/2025   Date of Service: 5/4/2025 I Hospital Day: 1     Assessment & Plan  Acute kidney injury (HCC)  Potential etiologies include volume depletion vs ATN vs HRS. FeNa and response to IVF/albumin suggesting volume depletion.    Cr baseline 0.9-1.1.   Cr 1.68 on admission and 1.96 today.   UA bland.  CT 5/2 no obstruction     Recommend:   Cr much improved to 1.3. Advise to stop plasmalyte, she has evidence of LE edema. Suspect given response, a prerenal state with FENa 0.7%.Can continue albumin today.  Can finish up albumin today as ordered --dose 50 gram then stop, received 25 gram today. This would be 48 hour of albumin administration.  Hold diuretics today and look to resume tomorrow.   Continue midodrine.   Falls  Potentially due to volume depletion in part with diet restriction and diuretics.   Continue volume trial.   Liver cirrhosis secondary to HYATT (nonalcoholic steatohepatitis) (HCC)  Plan to resume diuretics, renal function permitting,   Pancytopenia (HCC)  Suspect 2/2 cirrhosis in part.   Monitor cbc  Chest pain  Management per primary, potentially due to subacute tenth rib fracture.   Abdominal pain  Management per primary     I have reviewed the nephrology recommendations including albumin therapy and KISHORE regimen, with primary team, and we are in agreement with renal plan including the information outlined above.     Subjective   Patient seen and examined today.  She denies chest pain, shortness of breath, nausea, vomiting, diarrhea.  A complete 10 point review of systems was performed and is otherwise negative.     Objective :  Temp:  [97 °F (36.1 °C)-97.3 °F (36.3 °C)] 97.3 °F (36.3 °C)  HR:  [78-91] 86  BP: ()/() 122/70  Resp:  [17-20] 17  SpO2:  [90 %-97 %] 94 %  O2 Device: None (Room air)    Current Weight: Weight - Scale: 117 kg (258 lb 13.1 oz)  First  Weight: Weight - Scale: 116 kg (255 lb 4.7 oz)  I/O         05/02 0701  05/03 0700 05/03 0701  05/04 0700 05/04 0701  05/05 0700    P.O. 720 420     I.V. (mL/kg)  475 (4.1)     Total Intake(mL/kg) 720 (6.2) 895 (7.6)     Urine (mL/kg/hr) 300 551 (0.2)     Stool  0     Total Output 300 551     Net +420 +344            Unmeasured Urine Occurrence 2 x 1 x     Unmeasured Stool Occurrence 1 x 2 x           Physical Exam  Vitals reviewed.   Constitutional:       Appearance: She is obese. She is not ill-appearing.   HENT:      Head: Normocephalic and atraumatic.      Nose: Nose normal.      Mouth/Throat:      Mouth: Mucous membranes are moist.      Pharynx: Oropharynx is clear.   Cardiovascular:      Rate and Rhythm: Normal rate and regular rhythm.      Heart sounds:      No friction rub.   Pulmonary:      Breath sounds: No wheezing or rales.   Abdominal:      General: There is distension.      Tenderness: There is no abdominal tenderness. There is no guarding.   Musculoskeletal:      Right lower leg: Edema present.      Left lower leg: Edema present.      Comments: Thigh edema noted   Skin:     Coloration: Skin is not jaundiced.      Findings: No bruising.   Neurological:      General: No focal deficit present.      Mental Status: She is alert.      Cranial Nerves: No cranial nerve deficit.      Motor: No weakness.   Psychiatric:         Mood and Affect: Mood normal.         Behavior: Behavior normal.         Medications:    Current Facility-Administered Medications:     albumin human (FLEXBUMIN) 25 % injection 25 g, 25 g, Intravenous, Q6H, Jesenia Lawton DO, Last Rate: 0 mL/hr at 05/03/25 1520, 25 g at 05/04/25 0423    amitriptyline (ELAVIL) tablet 50 mg, 50 mg, Oral, HS, Maryjane Whatley MD, 50 mg at 05/03/25 2153    Artificial Tears Op Soln 1 drop, 1 drop, Both Eyes, Q4H PRN, Page MAGDA Thomas    dapsone tablet 50 mg, 50 mg, Oral, Daily, Maryjane Whatley MD, 50 mg at 05/03/25 9519    lactulose (CHRONULAC)  "oral solution 30 g, 30 g, Oral, 4x Daily, Maryjane Whatley MD    lidocaine (LIDODERM) 5 % patch 1 patch, 1 patch, Topical, Daily, Maryjane Whatley MD, 1 patch at 05/03/25 1501    midodrine (PROAMATINE) tablet 10 mg, 10 mg, Oral, TID AC, Maryjane Whatley MD, 10 mg at 05/04/25 0616    oxyCODONE (ROXICODONE) immediate release tablet 10 mg, 10 mg, Oral, Q6H PRN, Maryjane Whatley MD, 10 mg at 05/03/25 2303    pantoprazole (PROTONIX) EC tablet 40 mg, 40 mg, Oral, Early Morning, Maryjane Whatley MD, 40 mg at 05/04/25 0616    rifaximin (XIFAXAN) tablet 550 mg, 550 mg, Oral, Q12H MIGUEL, Maryjane Whatley MD, 550 mg at 05/03/25 2303    ursodiol (ACTIGALL) capsule 300 mg, 300 mg, Oral, BID, Maryjane Whatley MD, 300 mg at 05/03/25 1654      Lab Results: I have reviewed the following results:  Results from last 7 days   Lab Units 05/04/25  0440 05/03/25  0556 05/02/25  0854   WBC Thousand/uL 2.45* 2.22* 2.28*   HEMOGLOBIN g/dL 8.4* 8.3* 8.8*   HEMATOCRIT % 28.1* 27.5* 28.5*   PLATELETS Thousands/uL 70* 69* 69*   POTASSIUM mmol/L 4.4 4.7 4.4   CHLORIDE mmol/L 103 104 100   CO2 mmol/L 29 27 30   BUN mg/dL 24 28* 28*   CREATININE mg/dL 1.33* 1.96* 1.68*   CALCIUM mg/dL 9.1 8.6 8.7   MAGNESIUM mg/dL  --   --  2.0   ALBUMIN g/dL 3.7 2.9* 3.1*       Administrative Statements     Portions of the record may have been created with voice recognition software. Occasional wrong word or \"sound a like\" substitutions may have occurred due to the inherent limitations of voice recognition software. Read the chart carefully and recognize, using context, where substitutions have occurred.If you have any questions, please contact the dictating provider.  "

## 2025-05-04 NOTE — CASE MANAGEMENT
Case Management Discharge Planning Note    Patient name Bailey Olsen  Location /-01 MRN [id number]  : 1970 Date 2025       Current Admission Date: 2025  Current Admission Diagnosis:Falls   Patient Active Problem List    Diagnosis Date Noted Date Diagnosed    Falls 2025     Chest pain 2025     Abdominal pain 2025     Acute kidney injury (HCC) 2025     Gram-negative bacteremia 2025     Acute respiratory failure (HCC) 2025     Carcinoid tumor of pancreas 2025     Hypomagnesemia 2025     Upper GI bleed 2025     Acute anemia 2025     Rectal bleeding 2025     Acute blood loss anemia 2025     Chronic, continuous use of opioids 2025     Acute metabolic encephalopathy 2025     SIRS (systemic inflammatory response syndrome) (HCC) 2025     Sacral fracture, closed (HCC) 11/15/2024     KISHORE (acute kidney injury) (HCC) 10/04/2024     Morbid obesity with BMI of 40.0-44.9, adult (HCC) 10/04/2024     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) 2024     Fall from standing 2024     Closed compression fracture of L1 lumbar vertebra, sequela 2024     Closed stable burst fracture of first lumbar vertebra with routine healing 2024     Groin hematoma 2024     Ambulatory dysfunction 2024     Intractable back pain 2024     Hyperammonemia (HCC) 2024     Hepatic encephalopathy (HCC) 2024     Fall 2024     Liver cirrhosis secondary to HYATT (nonalcoholic steatohepatitis) (HCC) 2024     History of malignant carcinoid tumor of small intestine 2024     Class 3 severe obesity due to excess calories in adult 2024     Primary hypertension 2024     Hypokalemia 2024     Fibromyalgia 2024     Iron deficiency anemia 2024     Pancytopenia (HCC) 2024     Sarcoidosis 2024     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       LOS (days): 1  Geometric Mean LOS (GMLOS) (days):   Days to GMLOS:     OBJECTIVE:  Risk of Unplanned Readmission Score: 66.54         Current admission status: Inpatient   Preferred Pharmacy:   [id number] - [place] - [address] [facility]  [address] [address]  Phone: [phone] Fax: [phone]    [id number] [address]  Phone: [phone] Fax: [phone]    Primary Care Provider: Nayely Brown DO    Primary Insurance: BLUE CROSS  Secondary Insurance:     DISCHARGE DETAILS:        Pt has decided she would like to go to [facility] for [facility] as time of discharge.  CM securing bed in [facility]

## 2025-05-04 NOTE — ASSESSMENT & PLAN NOTE
Potential etiologies include volume depletion vs ATN vs HRS. FeNa and response to IVF/albumin suggesting volume depletion.    Cr baseline 0.9-1.1.   Cr 1.68 on admission and 1.96 today.   UA bland.  CT 5/2 no obstruction     Recommend:   Cr much improved to 1.3. Advise to stop plasmalyte, she has evidence of LE edema. Suspect given response, a prerenal state with FENa 0.7%.Can continue albumin today.  Can finish up albumin today as ordered --dose 50 gram then stop, received 25 gram today. This would be 48 hour of albumin administration.  Hold diuretics today and look to resume tomorrow.   Continue midodrine.

## 2025-05-04 NOTE — PROGRESS NOTES
Progress Note - Hospitalist   Name: Bailey Olsen 54 y.o. female I MRN: [id number]  Unit/Bed#: -01 I Date of Admission: 5/2/2025   Date of Service: 5/4/2025 I Hospital Day: 1    Assessment & Plan  Falls  EKG nonischemic troponin is negative CT head is negative for acute abnormality CT cervical is negative for acute abnormality  Suspect could be postural orthostatic  Hydration stop by nephrology today restart diuretics tomorrow  Orthostatics reviewed  Apparently patient is on coreg and not atenolol from chart PCP review and also on lisinopril for htn and questionable if she was taking midodrine-questionable having orthostatic hypotension  PT OT evaluated recommendation is level 2 patient is considering rehab  Liver cirrhosis secondary to HYATT (nonalcoholic steatohepatitis) (HCC)  With hyperammonemia but there is no hepatic encephalopathy-tremors , patient ammonia now was normal she had multiple bowel movements and 5/3 today I started her on lactulose 30 g 4 times a day as a home dose and will monitor how many bowel movements she has a.m. for 3-4 hold diuretics secondary to KISHORE and discussed with nephrology restart tomorrow  Continue Xifaxan  Pancytopenia (HCC)  Secondary to liver cirrhosis with platelets being less than 70,000 we will hold heparin hemoglobin is stable is also has associated iron deficiency anemia is greater than 8 pancytopenia secondary to liver cirrhosis  Chronic, continuous use of opioids  She is on oxycodone 10 every 6 hours as needed secondary to chronic low back pain  Chest pain  For 3 days denies falling forward tender on palpation also on breathing suspected secondary to the healing and now rib fracture she also had some opacities in both lungs which are now improving could be pneumonitis also could be costochondritis nonischemic EKG troponin is negative will proceed with a heating pad and Lidoderm patch  Slight improvement  Abdominal pain  Right side could be radiation down from the  infiltration pneumonitis could be muscular skeletal as there is a little bruise on the right side she is postcholecystectomy no obvious abnormality on CT abdomen and pelvis could be radiation down from broken ribs will place with a Lidoderm patch.  And a heating pad  5/4 resolved on palpation no tenderness  Acute kidney injury (HCC)  Creatinine is 1.6 normal is 0.9-1 creatinine worsened on fluids today questionable HRS questionable ATN will ask nephrology to see continue fluids for now further adjustment per nephrology CT abdomen and pelvis did not reveal any obstruction.  Will start treating for HRS will add to midodrine will add albumin every 6 hours for 5 doses   Creatinine improved to 1.3 discussed with nephrology fluids discontinued restart her diuretics tomorrow finished of albumin today    VTE Pharmacologic Prophylaxis: VTE Score: 3 Moderate Risk (Score 3-4) - Pharmacological DVT Prophylaxis Contraindicated. Sequential Compression Devices Ordered.    Mobility:   Basic Mobility Inpatient Raw Score: 15  JH-HLM Goal: 4: Move to chair/commode  JH-HLM Achieved: 4: Move to chair/commode  JH-HLM Goal NOT achieved. Continue with multidisciplinary rounding and encourage appropriate mobility to improve upon JH-HLM goals.    Patient Centered Rounds: I performed bedside rounds with nursing staff today.   Discussions with Specialists or Other Care Team Provider: Nephrology    Education and Discussions with Family / Patient: Updated  (sister) via phone.    Current Length of Stay: 1 day(s)  Current Patient Status: Inpatient   Certification Statement: The patient will continue to require additional inpatient hospital stay due to secondary to KISHORE  Discharge Plan: Anticipate discharge in 24-48 hrs to rehab facility.    Code Status: Level 1 - Full Code    Subjective   Seen examined the abdominal pain has resolved.  Chest on the right is when she turns    Objective :  Temp:  [97 °F (36.1 °C)-97.3 °F (36.3 °C)] 97.3  °F (36.3 °C)  HR:  [81-91] 86  BP: ()/(50-76) 122/70  Resp:  [17-19] 17  SpO2:  [90 %-97 %] 94 %  O2 Device: None (Room air)    Body mass index is 43.07 kg/m².     Input and Output Summary (last 24 hours):     Intake/Output Summary (Last 24 hours) at 5/4/2025 1000  Last data filed at 5/4/2025 0845  Gross per 24 hour   Intake 895 ml   Output 401 ml   Net 494 ml       Physical Exam  Vitals and nursing note reviewed.   Constitutional:       General: She is not in acute distress.     Appearance: She is well-developed.   HENT:      Head: Normocephalic and atraumatic.   Eyes:      Conjunctiva/sclera: Conjunctivae normal.   Cardiovascular:      Rate and Rhythm: Normal rate and regular rhythm.      Heart sounds: No murmur heard.  Pulmonary:      Effort: Pulmonary effort is normal. No respiratory distress.      Breath sounds: Normal breath sounds. No wheezing or rales.   Chest:      Chest wall: Tenderness present.   Abdominal:      General: There is no distension.      Palpations: Abdomen is soft.      Tenderness: There is no abdominal tenderness.   Musculoskeletal:         General: Swelling present.      Cervical back: Neck supple.   Skin:     General: Skin is warm and dry.      Capillary Refill: Capillary refill takes less than 2 seconds.   Neurological:      Mental Status: She is alert and oriented to person, place, and time.   Psychiatric:         Mood and Affect: Mood normal.           Lines/Drains:              Lab Results: I have reviewed the following results:   Results from last 7 days   Lab Units 05/04/25  0440   WBC Thousand/uL 2.45*   HEMOGLOBIN g/dL 8.4*   HEMATOCRIT % 28.1*   PLATELETS Thousands/uL 70*   SEGS PCT % 57   LYMPHO PCT % 29   MONO PCT % 7   EOS PCT % 5     Results from last 7 days   Lab Units 05/04/25  0440   SODIUM mmol/L 136   POTASSIUM mmol/L 4.4   CHLORIDE mmol/L 103   CO2 mmol/L 29   BUN mg/dL 24   CREATININE mg/dL 1.33*   ANION GAP mmol/L 4   CALCIUM mg/dL 9.1   ALBUMIN g/dL 3.7   TOTAL  BILIRUBIN mg/dL 0.94   ALK PHOS U/L 182*   ALT U/L 16   AST U/L 40*   GLUCOSE RANDOM mg/dL 91     Results from last 7 days   Lab Units 05/02/25  0854   INR  1.27*             Results from last 7 days   Lab Units 05/02/25  1122 05/02/25  0854   LACTIC ACID mmol/L  --  1.4   PROCALCITONIN ng/ml 0.10  --        Recent Cultures (last 7 days):         Imaging Results Review: No pertinent imaging studies reviewed.  Other Study Results Review: No additional pertinent studies reviewed.    Last 24 Hours Medication List:     Current Facility-Administered Medications:     albumin human (FLEXBUMIN) 25 % injection 25 g, Q6H    amitriptyline (ELAVIL) tablet 50 mg, HS    Artificial Tears Op Soln 1 drop, Q4H PRN    dapsone tablet 50 mg, Daily    lactulose (CHRONULAC) oral solution 30 g, 4x Daily    lidocaine (LIDODERM) 5 % patch 1 patch, Daily    midodrine (PROAMATINE) tablet 10 mg, TID AC    oxyCODONE (ROXICODONE) immediate release tablet 10 mg, Q6H PRN    pantoprazole (PROTONIX) EC tablet 40 mg, Early Morning    rifaximin (XIFAXAN) tablet 550 mg, Q12H MIGUEL    ursodiol (ACTIGALL) capsule 300 mg, BID    Administrative Statements   Today, Patient Was Seen By: Maryjane Whatley MD  I have spent a total time of >35 minutes in caring for this patient on the day of the visit/encounter including Patient and family education, Documenting in the medical record, and Reviewing/placing orders in the medical record (including tests, medications, and/or procedures).    **Please Note: This note may have been constructed using a voice recognition system.**

## 2025-05-04 NOTE — ASSESSMENT & PLAN NOTE
For 3 days denies falling forward tender on palpation also on breathing suspected secondary to the healing and now rib fracture she also had some opacities in both lungs which are now improving could be pneumonitis also could be costochondritis nonischemic EKG troponin is negative will proceed with a heating pad and Lidoderm patch  Slight improvement

## 2025-05-04 NOTE — ASSESSMENT & PLAN NOTE
With hyperammonemia but there is no hepatic encephalopathy-tremors , patient ammonia now was normal she had multiple bowel movements and 5/3 today I started her on lactulose 30 g 4 times a day as a home dose and will monitor how many bowel movements she has a.m. for 3-4 hold diuretics secondary to KISHORE and discussed with nephrology restart tomorrow  Continue Xifaxan

## 2025-05-04 NOTE — ASSESSMENT & PLAN NOTE
EKG nonischemic troponin is negative CT head is negative for acute abnormality CT cervical is negative for acute abnormality  Suspect could be postural orthostatic  Hydration stop by nephrology today restart diuretics tomorrow  Orthostatics reviewed  Apparently patient is on coreg and not atenolol from chart PCP review and also on lisinopril for htn and questionable if she was taking midodrine-questionable having orthostatic hypotension  PT OT evaluated recommendation is level 2 patient is considering rehab

## 2025-05-04 NOTE — PLAN OF CARE
Problem: Potential for Falls  Goal: Patient will remain free of falls  Description: INTERVENTIONS:- Educate patient/family on patient safety including physical limitations- Instruct patient to call for assistance with activity - Consult OT/PT to assist with strengthening/mobility - Keep Call bell within reach- Keep bed low and locked with side rails adjusted as appropriate- Keep care items and personal belongings within reach- Initiate and maintain comfort rounds- Make Fall Risk Sign visible to staff- Offer Toileting every 3 Hours, in advance of need- Initiate/Maintain bed alarm- Obtain necessary fall risk management equipment: yellow socks - Apply yellow socks and bracelet for high fall risk patients- Consider moving patient to room near nurses station  INTERVENTIONS:- Educate patient/family on patient safety including physical limitations- Instruct patient to call for assistance with activity - Consult OT/PT to assist with strengthening/mobility - Keep Call bell within reach- Keep bed low and locked with side rails adjusted as appropriate- Keep care items and personal belongings within reach- Initiate and maintain comfort rounds- Make Fall Risk Sign visible to staff- Offer Toileting every 3 Hours, in advance of need- Initiate/Maintain bed alarm- Obtain necessary fall risk management equipment: yellow socks - Apply yellow socks and bracelet for high fall risk patients- Consider moving patient to room near nurses station  Outcome: Progressing     Problem: Prexisting or High Potential for Compromised Skin Integrity  Goal: Skin integrity is maintained or improved  Description: INTERVENTIONS:- Identify patients at risk for skin breakdown- Assess and monitor skin integrity- Assess and monitor nutrition and hydration status- Monitor labs - Assess for incontinence - Turn and reposition patient- Assist with mobility/ambulation- Relieve pressure over bony prominences- Avoid friction and shearing- Provide appropriate  hygiene as needed including keeping skin clean and dry- Evaluate need for skin moisturizer/barrier cream- Collaborate with interdisciplinary team - Patient/family teaching- Consider wound care consult   Outcome: Progressing     Problem: PAIN - ADULT  Goal: Verbalizes/displays adequate comfort level or baseline comfort level  Description: Interventions:- Encourage patient to monitor pain and request assistance- Assess pain using appropriate pain scale- 0-10Administer analgesics based on type and severity of pain and evaluate response- Implement non-pharmacological measures as appropriate and evaluate response- Consider cultural and social influences on pain and pain management- Notify physician/advanced practitioner if interventions unsuccessful or patient reports new pain  Outcome: Progressing     Problem: INFECTION - ADULT  Goal: Absence or prevention of progression during hospitalization  Description: INTERVENTIONS:- Assess and monitor for signs and symptoms of infection- Monitor lab/diagnostic results- Monitor all insertion sites, i.e. indwelling lines, tubes, and drains- Monitor endotracheal if appropriate and nasal secretions for changes in amount and color- [place] appropriate cooling/warming therapies per order- Administer medications as ordered- Instruct and encourage patient and family to use good hand hygiene technique- Identify and instruct in appropriate isolation precautions for identified infection/condition  Outcome: Progressing  Goal: Absence of fever/infection during neutropenic period  Description: INTERVENTIONS:- Monitor WBC  Outcome: Progressing     Problem: SAFETY ADULT  Goal: Patient will remain free of falls  Description: INTERVENTIONS:- Educate patient/family on patient safety including physical limitations- Instruct patient to call for assistance with activity - Consult OT/PT to assist with strengthening/mobility - Keep Call bell within reach- Keep bed low and locked with side rails  adjusted as appropriate- Keep care items and personal belongings within reach- Initiate and maintain comfort rounds- Make Fall Risk Sign visible to staff- Offer Toileting every 3 Hours, in advance of need- Initiate/Maintain bed alarm- Obtain necessary fall risk management equipment: yellow socks - Apply yellow socks and bracelet for high fall risk patients- Consider moving patient to room near nurses station  INTERVENTIONS:- Educate patient/family on patient safety including physical limitations- Instruct patient to call for assistance with activity - Consult OT/PT to assist with strengthening/mobility - Keep Call bell within reach- Keep bed low and locked with side rails adjusted as appropriate- Keep care items and personal belongings within reach- Initiate and maintain comfort rounds- Make Fall Risk Sign visible to staff- Offer Toileting every 3 Hours, in advance of need- Initiate/Maintain bed alarm- Obtain necessary fall risk management equipment: yellow socks - Apply yellow socks and bracelet for high fall risk patients- Consider moving patient to room near nurses station  Outcome: Progressing  Goal: Maintain or return to baseline ADL function  Description: INTERVENTIONS:-  Assess patient's ability to carry out ADLs; assess patient's baseline for ADL function and identify physical deficits which impact ability to perform ADLs (bathing, care of mouth/teeth, toileting, grooming, dressing, etc.)- Assess/evaluate cause of self-care deficits - Assess range of motion- Assess patient's mobility; develop plan if impaired- Assess patient's need for assistive devices and provide as appropriate- Encourage maximum independence but intervene and supervise when necessary- Involve family in performance of ADLs- Assess for home care needs following discharge - Consider OT consult to assist with ADL evaluation and planning for discharge- Provide patient education as appropriate  Outcome: Progressing  Goal: Maintains/Returns to  pre admission functional level  Description: INTERVENTIONS:- Perform AM-PAC 6 Click Basic Mobility/ Daily Activity assessment daily.- Set and communicate daily mobility goal to care team and patient/family/caregiver. - Collaborate with rehabilitation services on mobility goals if consulted- Perform Range of Motion 3 times a day.- Reposition patient every 2 hours.- Dangle patient 3 times a day- Stand patient 3 times a day- Ambulate patient 3 times a day- Out of bed to chair 3 times a day - Out of bed for meals 3 times a day- Out of bed for toileting- Record patient progress and toleration of activity level   Outcome: Progressing     Problem: DISCHARGE PLANNING  Goal: Discharge to home or other facility with appropriate resources  Description: INTERVENTIONS:- Identify barriers to discharge w/patient and caregiver- Arrange for needed discharge resources and transportation as appropriate- Identify discharge learning needs (meds, wound care, etc.)- Arrange for interpretive services to assist at discharge as needed- Refer to Case Management Department for coordinating discharge planning if the patient needs post-hospital services based on physician/advanced practitioner order or complex needs related to functional status, cognitive ability, or social support system  Outcome: Progressing     Problem: Knowledge Deficit  Goal: Patient/family/caregiver demonstrates understanding of disease process, treatment plan, medications, and discharge instructions  Description: Complete learning assessment and assess knowledge base.Interventions:- Provide teaching at level of understanding- Provide teaching via preferred learning methods  Outcome: Progressing

## 2025-05-05 LAB
ANION GAP SERPL CALCULATED.3IONS-SCNC: 6 MMOL/L (ref 4–13)
BUN SERPL-MCNC: 16 MG/DL (ref 5–25)
CALCIUM SERPL-MCNC: 9.6 MG/DL (ref 8.4–10.2)
CHLORIDE SERPL-SCNC: 108 MMOL/L (ref 96–108)
CO2 SERPL-SCNC: 27 MMOL/L (ref 21–32)
CREAT SERPL-MCNC: 1.03 MG/DL (ref 0.6–1.3)
GFR SERPL CREATININE-BSD FRML MDRD: 61 ML/MIN/1.73SQ M
GLUCOSE SERPL-MCNC: 103 MG/DL (ref 65–140)
GLUCOSE SERPL-MCNC: 99 MG/DL (ref 65–140)
POTASSIUM SERPL-SCNC: 4.4 MMOL/L (ref 3.5–5.3)
SODIUM SERPL-SCNC: 141 MMOL/L (ref 135–147)

## 2025-05-05 PROCEDURE — 80048 BASIC METABOLIC PNL TOTAL CA: CPT | Performed by: FAMILY MEDICINE

## 2025-05-05 PROCEDURE — 82948 REAGENT STRIP/BLOOD GLUCOSE: CPT

## 2025-05-05 PROCEDURE — 97530 THERAPEUTIC ACTIVITIES: CPT

## 2025-05-05 PROCEDURE — 97110 THERAPEUTIC EXERCISES: CPT

## 2025-05-05 PROCEDURE — 99232 SBSQ HOSP IP/OBS MODERATE 35: CPT | Performed by: INTERNAL MEDICINE

## 2025-05-05 PROCEDURE — 99232 SBSQ HOSP IP/OBS MODERATE 35: CPT | Performed by: FAMILY MEDICINE

## 2025-05-05 PROCEDURE — 97116 GAIT TRAINING THERAPY: CPT

## 2025-05-05 RX ORDER — TORSEMIDE 20 MG/1
20 TABLET ORAL 2 TIMES DAILY
Status: DISCONTINUED | OUTPATIENT
Start: 2025-05-05 | End: 2025-05-06 | Stop reason: HOSPADM

## 2025-05-05 RX ORDER — SPIRONOLACTONE 25 MG/1
25 TABLET ORAL DAILY
Status: DISCONTINUED | OUTPATIENT
Start: 2025-05-05 | End: 2025-05-06 | Stop reason: HOSPADM

## 2025-05-05 RX ORDER — LACTULOSE 10 G/15ML
30 SOLUTION ORAL 4 TIMES DAILY
Status: DISCONTINUED | OUTPATIENT
Start: 2025-05-05 | End: 2025-05-06 | Stop reason: HOSPADM

## 2025-05-05 RX ORDER — CARVEDILOL 3.12 MG/1
3.12 TABLET ORAL 2 TIMES DAILY WITH MEALS
Status: DISCONTINUED | OUTPATIENT
Start: 2025-05-05 | End: 2025-05-06

## 2025-05-05 RX ADMIN — TORSEMIDE 20 MG: 20 TABLET ORAL at 17:21

## 2025-05-05 RX ADMIN — DAPSONE 50 MG: 25 TABLET ORAL at 08:39

## 2025-05-05 RX ADMIN — LACTULOSE 30 G: 10 SOLUTION ORAL at 21:11

## 2025-05-05 RX ADMIN — LACTULOSE 30 G: 20 SOLUTION ORAL at 03:39

## 2025-05-05 RX ADMIN — LIDOCAINE 5% 1 PATCH: 700 PATCH TOPICAL at 14:21

## 2025-05-05 RX ADMIN — MIDODRINE HYDROCHLORIDE 10 MG: 5 TABLET ORAL at 17:24

## 2025-05-05 RX ADMIN — URSODIOL 300 MG: 300 CAPSULE ORAL at 17:21

## 2025-05-05 RX ADMIN — RIFAXIMIN 550 MG: 550 TABLET ORAL at 23:26

## 2025-05-05 RX ADMIN — URSODIOL 300 MG: 300 CAPSULE ORAL at 08:39

## 2025-05-05 RX ADMIN — OXYCODONE HYDROCHLORIDE 10 MG: 10 TABLET ORAL at 13:25

## 2025-05-05 RX ADMIN — LACTULOSE 30 G: 10 SOLUTION ORAL at 12:02

## 2025-05-05 RX ADMIN — AMITRIPTYLINE HYDROCHLORIDE 50 MG: 25 TABLET, FILM COATED ORAL at 21:11

## 2025-05-05 RX ADMIN — LACTULOSE 30 G: 20 SOLUTION ORAL at 06:20

## 2025-05-05 RX ADMIN — MIDODRINE HYDROCHLORIDE 10 MG: 5 TABLET ORAL at 12:02

## 2025-05-05 RX ADMIN — RIFAXIMIN 550 MG: 550 TABLET ORAL at 12:02

## 2025-05-05 RX ADMIN — CARVEDILOL 3.12 MG: 3.12 TABLET, FILM COATED ORAL at 17:21

## 2025-05-05 RX ADMIN — SPIRONOLACTONE 25 MG: 25 TABLET ORAL at 08:39

## 2025-05-05 RX ADMIN — LACTULOSE 30 G: 20 SOLUTION ORAL at 00:18

## 2025-05-05 RX ADMIN — TORSEMIDE 20 MG: 20 TABLET ORAL at 08:39

## 2025-05-05 RX ADMIN — RIFAXIMIN 550 MG: 550 TABLET ORAL at 00:18

## 2025-05-05 RX ADMIN — PANTOPRAZOLE SODIUM 40 MG: 40 TABLET, DELAYED RELEASE ORAL at 06:19

## 2025-05-05 RX ADMIN — MIDODRINE HYDROCHLORIDE 10 MG: 5 TABLET ORAL at 06:19

## 2025-05-05 RX ADMIN — LACTULOSE 30 G: 20 SOLUTION ORAL at 08:39

## 2025-05-05 NOTE — ASSESSMENT & PLAN NOTE
With hyperammonemia but there is no hepatic encephalopathy-tremors , patient ammonia now was normal already had 2 bms on 30 g q3 hrs will change to home dose 30 g qid and aim at 3-4 a day   Restarted aldactone and tosemide  Restarted coreg   Continue Xifaxan

## 2025-05-05 NOTE — PHYSICAL THERAPY NOTE
"   PHYSICAL THERAPY TREATMENT NOTE  NAME:  Bailey lOsen  DATE: 05/05/25    Length Of Stay: 2  Performed at least 2 patient identifiers during session: Name and ID bracelet    TREATMENT FLOWSHEET:    05/05/25 0932   PT Last Visit   PT Visit Date 05/05/25   Note Type   Note Type Treatment   Pain Assessment   Pain Assessment Tool 0-10   Pain Score 8   Pain Location/Orientation Orientation: Right;Location: Rib Cage   Pain Onset/Description Frequency: Intermittent;Descriptor: Sharp   Patient's Stated Pain Goal No pain   Hospital Pain Intervention(s) Repositioned;Ambulation/increased activity   Restrictions/Precautions   Weight Bearing Precautions Per Order No   Other Precautions Chair Alarm;Bed Alarm;Fall Risk;Pain   General   Chart Reviewed Yes   Response to Previous Treatment Patient with no complaints from previous session.   Family/Caregiver Present No   Cognition   Overall Cognitive Status WFL   Arousal/Participation Alert;Cooperative   Attention Within functional limits   Orientation Level Oriented X4   Memory Decreased recall of precautions;Decreased short term memory   Following Commands Follows one step commands without difficulty   Subjective   Subjective \"I am doing better than I was when the therapist saw me last.\"   Bed Mobility   Additional Comments Pt. found sitting OIOB in recliner upon entering room   Transfers   Sit to Stand 5  Supervision   Additional items Assist x 1;Armrests;Increased time required;Verbal cues  (RW)   Stand to Sit 5  Supervision   Additional items Assist x 1;Armrests;Increased time required;Verbal cues   Stand pivot 5  Supervision   Additional items Assist x 1;Armrests;Increased time required;Verbal cues  (RW)   Additional Comments VC's provided for proper hand placement during transitions   Ambulation/Elevation   Gait pattern Improper Weight shift;Wide DENICE;Decreased foot clearance;Short stride;Excessively slow;Decreased hip extension;Decreased heel strike;Step through " pattern;Decreased toe off   Gait Assistance 5  Supervision   Additional items Assist x 1;Verbal cues   Assistive Device Rolling walker   Distance 300ft   Ambulation/Elevation Additional Comments Pt. with MAXWELL upon conclusion of ambulation with SpO2 WNL while on room air   Balance   Static Sitting Good   Dynamic Sitting Fair +   Static Standing Fair   Dynamic Standing Fair   Ambulatory Fair -   Endurance Deficit   Endurance Deficit Yes   Activity Tolerance   Activity Tolerance Patient limited by fatigue   Medical Staff Made Aware CM and physician aware   Nurse Made Aware RN aware   Exercises   Quad Sets 15 reps;AROM;Bilateral   Heelslides 15 reps;AROM;Bilateral   Glute Sets 15 reps;AROM;Bilateral   Hip Flexion 15 reps;AROM;Bilateral   Hip Abduction 15 reps;AROM;Bilateral   Hip Adduction 15 reps;AROM;Bilateral   Knee AROM Long Arc Quad 15 reps;AROM;Bilateral   Ankle Pumps 15 reps;AROM;Bilateral   Marching 15 reps;AROM;Bilateral   Assessment   Prognosis Good   Problem List Decreased strength;Decreased endurance;Impaired balance;Decreased mobility;Obesity;Pain;Decreased cognition;Impaired judgement   Goals   Patient Goals to go home   PT Treatment Day 1   Plan   Treatment/Interventions Functional transfer training;LE strengthening/ROM;Therapeutic exercise;Endurance training;Patient/family training;Equipment eval/education;Gait training;Compensatory technique education;Spoke to nursing;Spoke to case management;Spoke to MD   Progress Progressing toward goals   PT Frequency 3-5x/wk   Discharge Recommendation   Rehab Resource Intensity Level, PT (S)  III (Minimum Resource Intensity)   Additional Comments PT made aware of progress made this session and agreed with change in Recommendation.  Pt. states that she does not want rehab but would be accepting [facility] services.  Pt. educated in benefits of rehab.   AM-PAC Basic Mobility Inpatient   Turning in Flat Bed Without Bedrails 3   Lying on Back to Sitting on Edge of Flat Bed  Without Bedrails 3   Moving Bed to Chair 3   Standing Up From Chair Using Arms 3   Walk in Room 3   Climb 3-5 Stairs With Railing 3   Basic Mobility Inpatient Raw Score 18   Basic Mobility Standardized Score 41.05   [facility] Highest Level Of Mobility   -HLM Goal 6: Walk 10 steps or more   -HLM Achieved 8: Walk 250 feet ot more       The patient's AM-PAC Basic Mobility Inpatient Short Form Raw Score is 18. A raw score greater than 16 suggests the patient may benefit from discharge to home. Please also refer to the recommendation of the Physical Therapist for safe discharge planning.    Pt seen for PT treatment session this date with interventions consisting of transfer training, gait training, seated TE, and education provided as needed for safety and direction to improve functional mobility, safety awareness, and activity tolerance. Pt agreeable to PT treatment session upon arrival, pt found sitting OOB in recliner. At end of session, pt left sitting OOB in recliner with chair alarm activated and with all needs in reach. In comparison to previous session, pt with improvements in ambulation distance and amount of assistance required for all mobility tasks . Recommending Level III (Minimum Resource Intensity) at time of d/c in order to maximize pt's functional independence and safety w/ mobility. Pt continues to be functioning below baseline level. PT will continue to see pt while here in order to address the deficits listed above and provide interventions consistent w/ POC in effort to achieve STGs.    Magda Portillo, PTA

## 2025-05-05 NOTE — PROGRESS NOTES
Patient:    MRN:  [id number]    Sindy Request ID:  [id number]    Level of care reserved:  Skilled Nursing Facility    Partner Reserved:  [place] [address] [phone]    Clinical needs requested:    Geography searched:  10 miles around [id number]    Start of Service:    Request sent:  9:59am EDT on 5/4/2025 by Marizol Jensen    Partner reserved:  8:55am EDT on 5/5/2025 by Olga Benitez    Choice list shared:  8:55am EDT on 5/5/2025 by Olga Benitez

## 2025-05-05 NOTE — ASSESSMENT & PLAN NOTE
Creatinine is 1.6 normal is 0.9-1  questionable HRS questionable ATN hrs, s/p 5 doses of albumin , and on midodrine   Brian resolved

## 2025-05-05 NOTE — ASSESSMENT & PLAN NOTE
-There was question if the patient was on midodrine and actively taking.  Was on medication list.  This admission midodrine was restarted.  Lisinopril was held initially due to acute kidney injury.  Was restarted on low-dose carvedilol this admission.

## 2025-05-05 NOTE — ASSESSMENT & PLAN NOTE
-Admission creatinine 1.6 mg/dL and peak creatinine 1.9 mg/dL on 5/3  - Baseline creatinine is around 1 mg/dL  - Urinalysis bland  - CT scan without obstruction    Course of stay-patient was initially started on low-dose intravenous volume expansion.  Diuretics were held.  ACE inhibitor was held.  Was restarted on midodrine for supportive measures though there was question if the patient was taking at home appropriately.    5/4-intravenous fluids were held.  Received albumin.  5/5-creatinine improving to baseline.  Electrolytes appropriate.  Restarted on diuretics by primary team.    Plan  - Noted restarting of torsemide and spironolactone this morning and would monitor as doing inpatient with lab work in a.m.  - I have ordered orthostatics for the morning  - Continue midodrine  - Hold lisinopril on discharge  - I/os; avoid nephrotoxic agents  - Avoid hypotension

## 2025-05-05 NOTE — ASSESSMENT & PLAN NOTE
EKG nonischemic troponin is negative CT head is negative for acute abnormality CT cervical is negative for acute abnormality  Suspect could be postural orthostatic  Hydration stop by nephrology today restart diuretics tomorrow  Orthostatics reviewed  Apparently patient is on coreg and not atenolol from chart PCP review and also on lisinopril for htn and questionable if she was taking midodrine-questionable having orthostatic hypotension  PT OT evaluated today and recommended level 3- patient declined to go to rehab

## 2025-05-05 NOTE — ASSESSMENT & PLAN NOTE
-Likely due to liver disease-per primary team  - Dapsone management per primary team.  Appears to be on dapsone due to history of sarcoidosis and intermittently has required holding due to leukopenia?

## 2025-05-05 NOTE — PLAN OF CARE
Problem: Potential for Falls  Goal: Patient will remain free of falls  Description: INTERVENTIONS:- Educate patient/family on patient safety including physical limitations- Instruct patient to call for assistance with activity - Consult OT/PT to assist with strengthening/mobility - Keep Call bell within reach- Keep bed low and locked with side rails adjusted as appropriate- Keep care items and personal belongings within reach- Initiate and maintain comfort rounds- Make Fall Risk Sign visible to staff- Offer Toileting every 3 Hours, in advance of need- Initiate/Maintain bed alarm- Obtain necessary fall risk management equipment: yellow socks - Apply yellow socks and bracelet for high fall risk patients- Consider moving patient to room near nurses station  INTERVENTIONS:- Educate patient/family on patient safety including physical limitations- Instruct patient to call for assistance with activity - Consult OT/PT to assist with strengthening/mobility - Keep Call bell within reach- Keep bed low and locked with side rails adjusted as appropriate- Keep care items and personal belongings within reach- Initiate and maintain comfort rounds- Make Fall Risk Sign visible to staff- Offer Toileting every 3 Hours, in advance of need- Initiate/Maintain bed alarm- Obtain necessary fall risk management equipment: yellow socks - Apply yellow socks and bracelet for high fall risk patients- Consider moving patient to room near nurses station  Outcome: Progressing     Problem: Prexisting or High Potential for Compromised Skin Integrity  Goal: Skin integrity is maintained or improved  Description: INTERVENTIONS:- Identify patients at risk for skin breakdown- Assess and monitor skin integrity- Assess and monitor nutrition and hydration status- Monitor labs - Assess for incontinence - Turn and reposition patient- Assist with mobility/ambulation- Relieve pressure over bony prominences- Avoid friction and shearing- Provide appropriate  hygiene as needed including keeping skin clean and dry- Evaluate need for skin moisturizer/barrier cream- Collaborate with interdisciplinary team - Patient/family teaching- Consider wound care consult   Outcome: Progressing     Problem: PAIN - ADULT  Goal: Verbalizes/displays adequate comfort level or baseline comfort level  Description: Interventions:- Encourage patient to monitor pain and request assistance- Assess pain using appropriate pain scale- 0-10Administer analgesics based on type and severity of pain and evaluate response- Implement non-pharmacological measures as appropriate and evaluate response- Consider cultural and social influences on pain and pain management- Notify physician/advanced practitioner if interventions unsuccessful or patient reports new pain  Outcome: Progressing     Problem: INFECTION - ADULT  Goal: Absence or prevention of progression during hospitalization  Description: INTERVENTIONS:- Assess and monitor for signs and symptoms of infection- Monitor lab/diagnostic results- Monitor all insertion sites, i.e. indwelling lines, tubes, and drains- Monitor endotracheal if appropriate and nasal secretions for changes in amount and color- [place] appropriate cooling/warming therapies per order- Administer medications as ordered- Instruct and encourage patient and family to use good hand hygiene technique- Identify and instruct in appropriate isolation precautions for identified infection/condition  Outcome: Progressing  Goal: Absence of fever/infection during neutropenic period  Description: INTERVENTIONS:- Monitor WBC  Outcome: Progressing     Problem: SAFETY ADULT  Goal: Patient will remain free of falls  Description: INTERVENTIONS:- Educate patient/family on patient safety including physical limitations- Instruct patient to call for assistance with activity - Consult OT/PT to assist with strengthening/mobility - Keep Call bell within reach- Keep bed low and locked with side rails  adjusted as appropriate- Keep care items and personal belongings within reach- Initiate and maintain comfort rounds- Make Fall Risk Sign visible to staff- Offer Toileting every 3 Hours, in advance of need- Initiate/Maintain bed alarm- Obtain necessary fall risk management equipment: yellow socks - Apply yellow socks and bracelet for high fall risk patients- Consider moving patient to room near nurses station  INTERVENTIONS:- Educate patient/family on patient safety including physical limitations- Instruct patient to call for assistance with activity - Consult OT/PT to assist with strengthening/mobility - Keep Call bell within reach- Keep bed low and locked with side rails adjusted as appropriate- Keep care items and personal belongings within reach- Initiate and maintain comfort rounds- Make Fall Risk Sign visible to staff- Offer Toileting every 3 Hours, in advance of need- Initiate/Maintain bed alarm- Obtain necessary fall risk management equipment: yellow socks - Apply yellow socks and bracelet for high fall risk patients- Consider moving patient to room near nurses station  Outcome: Progressing  Goal: Maintain or return to baseline ADL function  Description: INTERVENTIONS:-  Assess patient's ability to carry out ADLs; assess patient's baseline for ADL function and identify physical deficits which impact ability to perform ADLs (bathing, care of mouth/teeth, toileting, grooming, dressing, etc.)- Assess/evaluate cause of self-care deficits - Assess range of motion- Assess patient's mobility; develop plan if impaired- Assess patient's need for assistive devices and provide as appropriate- Encourage maximum independence but intervene and supervise when necessary- Involve family in performance of ADLs- Assess for home care needs following discharge - Consider OT consult to assist with ADL evaluation and planning for discharge- Provide patient education as appropriate  Outcome: Progressing  Goal: Maintains/Returns to  pre admission functional level  Description: INTERVENTIONS:- Perform AM-PAC 6 Click Basic Mobility/ Daily Activity assessment daily.- Set and communicate daily mobility goal to care team and patient/family/caregiver. - Collaborate with rehabilitation services on mobility goals if consulted- Perform Range of Motion 3 times a day.- Reposition patient every 2 hours.- Dangle patient 3 times a day- Stand patient 3 times a day- Ambulate patient 3 times a day- Out of bed to chair 3 times a day - Out of bed for meals 3 times a day- Out of bed for toileting- Record patient progress and toleration of activity level   Outcome: Progressing     Problem: DISCHARGE PLANNING  Goal: Discharge to home or other facility with appropriate resources  Description: INTERVENTIONS:- Identify barriers to discharge w/patient and caregiver- Arrange for needed discharge resources and transportation as appropriate- Identify discharge learning needs (meds, wound care, etc.)- Arrange for interpretive services to assist at discharge as needed- Refer to Case Management Department for coordinating discharge planning if the patient needs post-hospital services based on physician/advanced practitioner order or complex needs related to functional status, cognitive ability, or social support system  Outcome: Progressing     Problem: Knowledge Deficit  Goal: Patient/family/caregiver demonstrates understanding of disease process, treatment plan, medications, and discharge instructions  Description: Complete learning assessment and assess knowledge base.Interventions:- Provide teaching at level of understanding- Provide teaching via preferred learning methods  Outcome: Progressing

## 2025-05-05 NOTE — CASE MANAGEMENT
Case Management Discharge Planning Note    Patient name Bailey Olsen  Location /-01 MRN [id number]  : 1970 Date 2025       Current Admission Date: 2025  Current Admission Diagnosis:Falls   Patient Active Problem List    Diagnosis Date Noted Date Diagnosed    Falls 2025     Chest pain 2025     Abdominal pain 2025     Acute kidney injury (HCC) 2025     Gram-negative bacteremia 2025     Acute respiratory failure (HCC) 2025     Carcinoid tumor of pancreas 2025     Hypomagnesemia 2025     Upper GI bleed 2025     Acute anemia 2025     Rectal bleeding 2025     Acute blood loss anemia 2025     Chronic, continuous use of opioids 2025     Acute metabolic encephalopathy 2025     SIRS (systemic inflammatory response syndrome) (HCC) 2025     Sacral fracture, closed (HCC) 11/15/2024     KISHORE (acute kidney injury) (HCC) 10/04/2024     Morbid obesity with BMI of 40.0-44.9, adult (HCC) 10/04/2024     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) 2024     Fall from standing 2024     Closed compression fracture of L1 lumbar vertebra, sequela 2024     Closed stable burst fracture of first lumbar vertebra with routine healing 2024     Groin hematoma 2024     Ambulatory dysfunction 2024     Intractable back pain 2024     Hyperammonemia (HCC) 2024     Hepatic encephalopathy (HCC) 2024     Fall 2024     Liver cirrhosis secondary to HYATT (nonalcoholic steatohepatitis) (HCC) 2024     History of malignant carcinoid tumor of small intestine 2024     Class 3 severe obesity due to excess calories in adult 2024     Primary hypertension 2024     Hypokalemia 2024     Fibromyalgia 2024     Iron deficiency anemia 2024     Pancytopenia (HCC) 2024     Sarcoidosis 2024     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       LOS (days): 2  Geometric Mean LOS (GMLOS) (days):   Days to GMLOS:     OBJECTIVE:  Risk of Unplanned Readmission Score: 65.18         Current admission status: Inpatient   Preferred Pharmacy:   [id number] - [place] - [address] [facility]  [address] [facility] [address]  Phone: [phone] Fax: [phone]    [id number] - [place] - [facility] [address]  Phone: [phone] Fax: [phone]    Primary Care Provider: Nayely Brown DO    Primary Insurance: MEDICARE  Secondary Insurance: BLUE CROSS    DISCHARGE DETAILS:        CM made aware patient did well with therapy today and discharge recommendation changed from STR to HHC.    CM submitted [facility] referral for HHC.  CM to follow for accepting agencies and review options with patient.

## 2025-05-05 NOTE — ASSESSMENT & PLAN NOTE
-Was noted to have tenderness to palpation on chest wall.  Felt to be more due to musculoskeletal regular than coronary related.  Further plans per primary team

## 2025-05-05 NOTE — ASSESSMENT & PLAN NOTE
-Possibly orthostasis and volume depletion  - Was initially significantly orthostatic on 5/30 which appears to be improving on 5/4.  On 5/5 no longer orthostatic

## 2025-05-05 NOTE — PLAN OF CARE
Problem: PHYSICAL THERAPY ADULT  Goal: Performs mobility at highest level of function for planned discharge setting.  See evaluation for individualized goals.  Description: Treatment/Interventions: Functional transfer training, LE strengthening/ROM, Elevations, Therapeutic exercise, Endurance training, Cognitive reorientation, Patient/family training, Equipment eval/education, Bed mobility, Gait training, Compensatory technique education          See flowsheet documentation for full assessment, interventions and recommendations.  Outcome: Progressing  Note: Prognosis: Good  Problem List: Decreased strength, Decreased endurance, Impaired balance, Decreased mobility, Obesity, Pain, Decreased cognition, Impaired judgement  Assessment: Pt seen for PT treatment session this date with interventions consisting of transfer training, gait training, seated TE, and education provided as needed for safety and direction to improve functional mobility, safety awareness, and activity tolerance. Pt agreeable to PT treatment session upon arrival, pt found sitting OOB in recliner. At end of session, pt left sitting OOB in recliner with chair alarm activated and with all needs in reach. In comparison to previous session, pt with improvements in ambulation distance and amount of assistance required for all mobility tasks . Recommending Level III (Minimum Resource Intensity) at time of d/c in order to maximize pt's functional independence and safety w/ mobility. Pt continues to be functioning below baseline level. PT will continue to see pt while here in order to address the deficits listed above and provide interventions consistent w/ POC in effort to achieve STGs.  Barriers to Discharge: Other (Comment)  Barriers to Discharge Comments: currently requiring hands on assist for mobility; feels lightheaded with mobility; high fall risk; high risk of re-admission; 1 FABRICIO; alone at times  Rehab Resource Intensity Level, PT: (S) III  (Minimum Resource Intensity)    See flowsheet documentation for full assessment.

## 2025-05-05 NOTE — PROGRESS NOTES
Progress Note - Hospitalist   Name: Bailey Olsen 54 y.o. female I MRN: [id number]  Unit/Bed#: -01 I Date of Admission: 5/2/2025   Date of Service: 5/5/2025 I Hospital Day: 2    Assessment & Plan  Falls  EKG nonischemic troponin is negative CT head is negative for acute abnormality CT cervical is negative for acute abnormality  Suspect could be postural orthostatic  Hydration stop by nephrology today restart diuretics tomorrow  Orthostatics reviewed  Apparently patient is on coreg and not atenolol from chart PCP review and also on lisinopril for htn and questionable if she was taking midodrine-questionable having orthostatic hypotension  PT OT evaluated today and recommended level 3- patient declined to go to rehab   Liver cirrhosis secondary to HYATT (nonalcoholic steatohepatitis) (HCC)  With hyperammonemia but there is no hepatic encephalopathy-tremors , patient ammonia now was normal already had 2 bms on 30 g q3 hrs will change to home dose 30 g qid and aim at 3-4 a day   Restarted aldactone and tosemide  Restarted coreg   Continue Xifaxan  Pancytopenia (HCC)  Secondary to liver cirrhosis with platelets being less than 70,000 we will hold heparin hemoglobin is stable is also has associated iron deficiency anemia is greater than 8 pancytopenia secondary to liver cirrhosis  Chronic, continuous use of opioids  She is on oxycodone 10 every 6 hours as needed secondary to chronic low back pain  Chest pain  For 3 days denies falling forward tender on palpation also on breathing suspected secondary to the healing and now rib fracture she also had some opacities in both lungs which are now improving could be pneumonitis also could be costochondritis nonischemic EKG troponin is negative will proceed with a heating pad and Lidoderm patch  Slight improvement  Abdominal pain  Right side could be radiation down from the infiltration pneumonitis could be muscular skeletal as there is a little bruise on the right side she  is postcholecystectomy no obvious abnormality on CT abdomen and pelvis could be radiation down from broken ribs will place with a Lidoderm patch.  And a heating pad  5/4 resolved on palpation no tenderness  Acute kidney injury (HCC)  Creatinine is 1.6 normal is 0.9-1  questionable HRS questionable ATN hrs, s/p 5 doses of albumin , and on midodrine   Brian resolved    VTE Pharmacologic Prophylaxis: VTE Score: 3 Moderate Risk (Score 3-4) - Pharmacological DVT Prophylaxis Contraindicated. Sequential Compression Devices Ordered.    Mobility:   Basic Mobility Inpatient Raw Score: 18  JH-HLM Goal: 6: Walk 10 steps or more  JH-HLM Achieved: 8: Walk 250 feet ot more  JH-HLM Goal achieved. Continue to encourage appropriate mobility.    Patient Centered Rounds: I performed bedside rounds with nursing staff today.   Discussions with Specialists or Other Care Team Provider: none    Education and Discussions with Family / Patient: Updated  (sister) via phone.    Current Length of Stay: 2 day(s)  Current Patient Status: Inpatient   Certification Statement: The patient will continue to require additional inpatient hospital stay due to 2/2 restart meds and monitor bp   Discharge Plan: Anticipate discharge tomorrow to home.    Code Status: Level 1 - Full Code    Subjective   Seen and examined doing better     Objective :  Temp:  [97 °F (36.1 °C)-97.7 °F (36.5 °C)] 97.5 °F (36.4 °C)  HR:  [95-98] 96  BP: (102-129)/(52-73) 123/73  Resp:  [18] 18  SpO2:  [88 %-96 %] 95 %  O2 Device: None (Room air)  Nasal Cannula O2 Flow Rate (L/min):  [2 L/min] 2 L/min    Body mass index is 43.07 kg/m².     Input and Output Summary (last 24 hours):     Intake/Output Summary (Last 24 hours) at 5/5/2025 0954  Last data filed at 5/5/2025 0931  Gross per 24 hour   Intake 1500 ml   Output 500 ml   Net 1000 ml       Physical Exam  Vitals and nursing note reviewed.   Constitutional:       General: She is not in acute distress.     Appearance: She  is well-developed.   HENT:      Head: Normocephalic and atraumatic.   Eyes:      Conjunctiva/sclera: Conjunctivae normal.   Cardiovascular:      Rate and Rhythm: Normal rate and regular rhythm.      Heart sounds: No murmur heard.  Pulmonary:      Effort: Pulmonary effort is normal. No respiratory distress.      Breath sounds: Normal breath sounds. No wheezing or rales.   Abdominal:      General: There is no distension.      Palpations: Abdomen is soft.      Tenderness: There is no abdominal tenderness.   Musculoskeletal:         General: Swelling present.      Cervical back: Neck supple.   Skin:     General: Skin is warm and dry.      Capillary Refill: Capillary refill takes less than 2 seconds.   Neurological:      Mental Status: She is alert and oriented to person, place, and time.   Psychiatric:         Mood and Affect: Mood normal.           Lines/Drains:              Lab Results: I have reviewed the following results:   Results from last 7 days   Lab Units 05/04/25  0440   WBC Thousand/uL 2.45*   HEMOGLOBIN g/dL 8.4*   HEMATOCRIT % 28.1*   PLATELETS Thousands/uL 70*   SEGS PCT % 57   LYMPHO PCT % 29   MONO PCT % 7   EOS PCT % 5     Results from last 7 days   Lab Units 05/05/25  0430 05/04/25  0440   SODIUM mmol/L 141 136   POTASSIUM mmol/L 4.4 4.4   CHLORIDE mmol/L 108 103   CO2 mmol/L 27 29   BUN mg/dL 16 24   CREATININE mg/dL 1.03 1.33*   ANION GAP mmol/L 6 4   CALCIUM mg/dL 9.6 9.1   ALBUMIN g/dL  --  3.7   TOTAL BILIRUBIN mg/dL  --  0.94   ALK PHOS U/L  --  182*   ALT U/L  --  16   AST U/L  --  40*   GLUCOSE RANDOM mg/dL 99 91     Results from last 7 days   Lab Units 05/02/25  0854   INR  1.27*     Results from last 7 days   Lab Units 05/05/25  0023   POC GLUCOSE mg/dl 103         Results from last 7 days   Lab Units 05/02/25  1122 05/02/25  0854   LACTIC ACID mmol/L  --  1.4   PROCALCITONIN ng/ml 0.10  --        Recent Cultures (last 7 days):         Imaging Results Review: No pertinent imaging studies  reviewed.  Other Study Results Review: No additional pertinent studies reviewed.    Last 24 Hours Medication List:     Current Facility-Administered Medications:     amitriptyline (ELAVIL) tablet 50 mg, HS    Artificial Tears Op Soln 1 drop, Q4H PRN    carvedilol (COREG) tablet 3.125 mg, BID With Meals    dapsone tablet 50 mg, Daily    lactulose (CHRONULAC) oral solution 30 g, 4x daily    lidocaine (LIDODERM) 5 % patch 1 patch, Daily    midodrine (PROAMATINE) tablet 10 mg, TID AC    oxyCODONE (ROXICODONE) immediate release tablet 10 mg, Q6H PRN    pantoprazole (PROTONIX) EC tablet 40 mg, Early Morning    rifaximin (XIFAXAN) tablet 550 mg, Q12H MIGUEL    spironolactone (ALDACTONE) tablet 25 mg, Daily    torsemide (DEMADEX) tablet 20 mg, BID    ursodiol (ACTIGALL) capsule 300 mg, BID    Administrative Statements   Today, Patient Was Seen By: Maryjane Whatley MD  I have spent a total time of >35 minutes in caring for this patient on the day of the visit/encounter including Patient and family education, Documenting in the medical record, and Reviewing/placing orders in the medical record (including tests, medications, and/or procedures).    **Please Note: This note may have been constructed using a voice recognition system.**

## 2025-05-05 NOTE — PROGRESS NOTES
Progress Note - Nephrology   Name: Bailey Olsen 54 y.o. female I MRN: [id number]  Unit/Bed#: -01 I Date of Admission: 5/2/2025   Date of Service: 5/5/2025 I Hospital Day: 2    Assessment & Plan  Acute kidney injury (HCC)  -Admission creatinine 1.6 mg/dL and peak creatinine 1.9 mg/dL on 5/3  - Baseline creatinine is around 1 mg/dL  - Urinalysis bland  - CT scan without obstruction    Course of stay-patient was initially started on low-dose intravenous volume expansion.  Diuretics were held.  ACE inhibitor was held.  Was restarted on midodrine for supportive measures though there was question if the patient was taking at home appropriately.    5/4-intravenous fluids were held.  Received albumin.  5/5-creatinine improving to baseline.  Electrolytes appropriate.  Restarted on diuretics by primary team.    Plan  - Noted restarting of torsemide and spironolactone this morning and would monitor as doing inpatient with lab work in a.m.  - I have ordered orthostatics for the morning  - Continue midodrine  - Hold lisinopril on discharge  - I/os; avoid nephrotoxic agents  - Avoid hypotension  Falls  -Possibly orthostasis and volume depletion  - Was initially significantly orthostatic on 5/30 which appears to be improving on 5/4.  On 5/5 no longer orthostatic  Liver cirrhosis secondary to HYATT (nonalcoholic steatohepatitis) (HCC)  -There was concern with compliance with lactulose at home?  - Was started on lactulose while inpatient  - Continued on Xifaxan  Pancytopenia (HCC)  -Likely due to liver disease-per primary team  - Dapsone management per primary team.  Appears to be on dapsone due to history of sarcoidosis and intermittently has required holding due to leukopenia?  Chest pain  -Was noted to have tenderness to palpation on chest wall.  Felt to be more due to musculoskeletal regular than coronary related.  Further plans per primary team  Abdominal pain  -Unclear if related to pneumonitis versus other  Primary  hypertension  -There was question if the patient was on midodrine and actively taking.  Was on medication list.  This admission midodrine was restarted.  Lisinopril was held initially due to acute kidney injury.  Was restarted on low-dose carvedilol this admission.    I have reviewed the nephrology recommendations including no objection to initiating diuretic today, with primary team attending, and we are in agreement with renal plan including the information outlined above.     Subjective   Brief History of Admission -54-year-old female with a past medical history of CHF, hypertension, GI bleed, cirrhosis, elevated BMI, who initially presents on 5/2 with 3 days of right-sided pain in the right upper quadrant with movement and also has had multiple falls at home and lightheadedness and excessive sleepiness.  Nephrology was consulted for acute kidney injury.  Initially patient was started on volume expansion.  Diuretics were held.  As there was concern for possible orthostasis.    Past 24-hour-patient without complaints.  Blood pressures 120 systolic.  No longer orthostatic.    Objective :  Temp:  [97 °F (36.1 °C)-97.7 °F (36.5 °C)] 97.5 °F (36.4 °C)  HR:  [95-98] 96  BP: (102-129)/(52-73) 123/73  Resp:  [18] 18  SpO2:  [88 %-97 %] 97 %  O2 Device: None (Room air)  Nasal Cannula O2 Flow Rate (L/min):  [2 L/min] 2 L/min    Current Weight: Weight - Scale: 117 kg (258 lb 13.1 oz)  First Weight: Weight - Scale: 116 kg (255 lb 4.7 oz)  I/O         05/03 0701  05/04 0700 05/04 0701  05/05 0700 05/05 0701  05/06 0700    P.O. 420 1000 960    I.V. (mL/kg) 475 (4.1)      IV Piggyback  200     Total Intake(mL/kg) 895 (7.6) 1200 (10.3) 960 (8.2)    Urine (mL/kg/hr) 551 (0.2) 500 (0.2)     Stool 0 0     Total Output 551 500     Net +344 +700 +960           Unmeasured Urine Occurrence 1 x 1 x 2 x    Unmeasured Stool Occurrence 2 x 2 x           Physical Exam  General: NAD  Skin: no rash  Eyes: anicteric sclera  ENT: moist mucous  membrane  Neck: supple  Chest: CTA b/l, no ronchii, no wheeze, no rubs, no rales  CVS: s1s2, no murmur, no gallop, no rub  Abdomen: soft, nontender, nl sounds  Extremities: 1+ edema LE b/l  : no lagunas  Neuro: AAOX3  Psych: normal affect    Medications:    Current Facility-Administered Medications:     amitriptyline (ELAVIL) tablet 50 mg, 50 mg, Oral, HS, Maryjane Whatley MD, 50 mg at 05/04/25 2225    Artificial Tears Op Soln 1 drop, 1 drop, Both Eyes, Q4H PRN, BoswellMAGDA Vasquez    carvedilol (COREG) tablet 3.125 mg, 3.125 mg, Oral, BID With Meals, Maryjane Whatley MD    dapsone tablet 50 mg, 50 mg, Oral, Daily, Maryjane Whatley MD, 50 mg at 05/05/25 0839    lactulose (CHRONULAC) oral solution 30 g, 30 g, Oral, 4x daily, Maryjane Whatley MD, 30 g at 05/05/25 1202    lidocaine (LIDODERM) 5 % patch 1 patch, 1 patch, Topical, Daily, Maryjane Whatley MD, 1 patch at 05/04/25 1444    midodrine (PROAMATINE) tablet 10 mg, 10 mg, Oral, TID AC, Maryjane Whatley MD, 10 mg at 05/05/25 1202    oxyCODONE (ROXICODONE) immediate release tablet 10 mg, 10 mg, Oral, Q6H PRN, Maryjane Whatley MD, 10 mg at 05/05/25 1325    pantoprazole (PROTONIX) EC tablet 40 mg, 40 mg, Oral, Early Morning, Maryjane Whatley MD, 40 mg at 05/05/25 0619    rifaximin (XIFAXAN) tablet 550 mg, 550 mg, Oral, Q12H MIGUEL, Maryjane Whatley MD, 550 mg at 05/05/25 1202    spironolactone (ALDACTONE) tablet 25 mg, 25 mg, Oral, Daily, Maryjane Whatley MD, 25 mg at 05/05/25 0839    torsemide (DEMADEX) tablet 20 mg, 20 mg, Oral, BID, Maryjane Whatley MD, 20 mg at 05/05/25 0839    ursodiol (ACTIGALL) capsule 300 mg, 300 mg, Oral, BID, Maryjane Whatley MD, 300 mg at 05/05/25 0839      Lab Results: I have reviewed the following results:  Results from last 7 days   Lab Units 05/05/25  0430 05/04/25  0440 05/03/25  0556 05/02/25  0854   WBC Thousand/uL  --  2.45* 2.22* 2.28*   HEMOGLOBIN g/dL  --  8.4* 8.3* 8.8*   HEMATOCRIT %  --  28.1* 27.5* 28.5*   PLATELETS  "Thousands/uL  --  70* 69* 69*   POTASSIUM mmol/L 4.4 4.4 4.7 4.4   CHLORIDE mmol/L 108 103 104 100   CO2 mmol/L 27 29 27 30   BUN mg/dL 16 24 28* 28*   CREATININE mg/dL 1.03 1.33* 1.96* 1.68*   CALCIUM mg/dL 9.6 9.1 8.6 8.7   MAGNESIUM mg/dL  --   --   --  2.0   ALBUMIN g/dL  --  3.7 2.9* 3.1*       Administrative Statements     Portions of the record may have been created with voice recognition software. Occasional wrong word or \"sound a like\" substitutions may have occurred due to the inherent limitations of voice recognition software. Read the chart carefully and recognize, using context, where substitutions have occurred.If you have any questions, please contact the dictating provider.  "

## 2025-05-05 NOTE — ASSESSMENT & PLAN NOTE
-There was concern with compliance with lactulose at home?  - Was started on lactulose while inpatient  - Continued on Xifaxan

## 2025-05-05 NOTE — CASE MANAGEMENT
Case Management Discharge Planning Note    Patient name Bailey Olsen  Location /-01 MRN [id number]  : 1970 Date 2025       Current Admission Date: 2025  Current Admission Diagnosis:Falls   Patient Active Problem List    Diagnosis Date Noted Date Diagnosed    Falls 2025     Chest pain 2025     Abdominal pain 2025     Acute kidney injury (HCC) 2025     Gram-negative bacteremia 2025     Acute respiratory failure (HCC) 2025     Carcinoid tumor of pancreas 2025     Hypomagnesemia 2025     Upper GI bleed 2025     Acute anemia 2025     Rectal bleeding 2025     Acute blood loss anemia 2025     Chronic, continuous use of opioids 2025     Acute metabolic encephalopathy 2025     SIRS (systemic inflammatory response syndrome) (HCC) 2025     Sacral fracture, closed (HCC) 11/15/2024     KISHORE (acute kidney injury) (HCC) 10/04/2024     Morbid obesity with BMI of 40.0-44.9, adult (HCC) 10/04/2024     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) 2024     Fall from standing 2024     Closed compression fracture of L1 lumbar vertebra, sequela 2024     Closed stable burst fracture of first lumbar vertebra with routine healing 2024     Groin hematoma 2024     Ambulatory dysfunction 2024     Intractable back pain 2024     Hyperammonemia (HCC) 2024     Hepatic encephalopathy (HCC) 2024     Fall 2024     Liver cirrhosis secondary to HYATT (nonalcoholic steatohepatitis) (HCC) 2024     History of malignant carcinoid tumor of small intestine 2024     Class 3 severe obesity due to excess calories in adult 2024     Primary hypertension 2024     Hypokalemia 2024     Fibromyalgia 2024     Iron deficiency anemia 2024     Pancytopenia (HCC) 2024     Sarcoidosis 2024     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       LOS (days): 2  Geometric Mean LOS (GMLOS) (days): 3  Days to GMLOS:0.6     OBJECTIVE:  Risk of Unplanned Readmission Score: 63.14         Current admission status: Inpatient   Preferred Pharmacy:   [id number] - [place] - [address]  [address] [facility] [address]  Phone: [phone] Fax: [phone]    [id number] - [place] - [address]  [address] [address]  Phone: [phone] Fax: [phone]    Primary Care Provider: Nayely Brown DO    Primary Insurance: MEDICARE  Secondary Insurance: BLUE CROSS    DISCHARGE DETAILS:    Discharge planning discussed with:: patient  Freedom of Choice: Yes  Comments - Freedom of Choice: [facility] referral for HHC                     Requested Home Health Care         Is the patient interested in HHC at discharge?: Yes  Home Health Discipline requested:: Nursing, Occupational Therapy, Physical Therapy  Home Health Agency Name:: [facility] External Referral Reason (only applicable if external HHA name selected): Patient has established relationship with provider  Home Health Follow-Up Provider:: PCP  Home Health Services Needed:: Evaluate Functional Status and Safety, Gait/ADL Training, Strengthening/Theraputic Exercises to Improve Function, Other (comment) (Medication Management)  Homebound Criteria Met:: Uses an Assist Device (i.e. cane, walker, etc)  Supporting Clincal Findings:: Limited Endurance, Fatigues Easliy in Short Distances    DME Referral Provided  Referral made for DME?: No    Other Referral/Resources/Interventions Provided:  Interventions: HHC  Referral Comments: [facility]         Treatment Team Recommendation: Home with Home Health Care  Discharge Destination Plan:: Home with Home Health Care  Transport at Discharge : Family                  CM met with patient to review [facility] Provider Choice List for STR.  [facility] chose [facility].  CM Made [facility] providers aware of patient choice.    Patient reported her daughter or sister will provide transport home. Patient gave her vehicle to her daughter as patient does not drive anymore.     CM to follow for medical stability for discharge.

## 2025-05-06 VITALS
DIASTOLIC BLOOD PRESSURE: 48 MMHG | BODY MASS INDEX: 41.25 KG/M2 | HEIGHT: 65 IN | HEART RATE: 98 BPM | SYSTOLIC BLOOD PRESSURE: 100 MMHG | RESPIRATION RATE: 18 BRPM | TEMPERATURE: 97.2 F | WEIGHT: 247.58 LBS | OXYGEN SATURATION: 92 %

## 2025-05-06 LAB
ANION GAP SERPL CALCULATED.3IONS-SCNC: 5 MMOL/L (ref 4–13)
BUN SERPL-MCNC: 17 MG/DL (ref 5–25)
CALCIUM SERPL-MCNC: 9 MG/DL (ref 8.4–10.2)
CHLORIDE SERPL-SCNC: 100 MMOL/L (ref 96–108)
CO2 SERPL-SCNC: 34 MMOL/L (ref 21–32)
CREAT SERPL-MCNC: 1.04 MG/DL (ref 0.6–1.3)
GFR SERPL CREATININE-BSD FRML MDRD: 60 ML/MIN/1.73SQ M
GLUCOSE SERPL-MCNC: 89 MG/DL (ref 65–140)
MAGNESIUM SERPL-MCNC: 1.4 MG/DL (ref 1.9–2.7)
PHOSPHATE SERPL-MCNC: 3.9 MG/DL (ref 2.7–4.5)
POTASSIUM SERPL-SCNC: 3.7 MMOL/L (ref 3.5–5.3)
SODIUM SERPL-SCNC: 139 MMOL/L (ref 135–147)

## 2025-05-06 PROCEDURE — 99232 SBSQ HOSP IP/OBS MODERATE 35: CPT | Performed by: INTERNAL MEDICINE

## 2025-05-06 PROCEDURE — 83735 ASSAY OF MAGNESIUM: CPT | Performed by: INTERNAL MEDICINE

## 2025-05-06 PROCEDURE — 80048 BASIC METABOLIC PNL TOTAL CA: CPT | Performed by: INTERNAL MEDICINE

## 2025-05-06 PROCEDURE — 84100 ASSAY OF PHOSPHORUS: CPT | Performed by: INTERNAL MEDICINE

## 2025-05-06 PROCEDURE — 99239 HOSP IP/OBS DSCHRG MGMT >30: CPT | Performed by: INTERNAL MEDICINE

## 2025-05-06 RX ORDER — POTASSIUM CHLORIDE 1500 MG/1
20 TABLET, EXTENDED RELEASE ORAL ONCE
Status: COMPLETED | OUTPATIENT
Start: 2025-05-06 | End: 2025-05-06

## 2025-05-06 RX ORDER — METOPROLOL TARTRATE 25 MG/1
12.5 TABLET, FILM COATED ORAL EVERY 12 HOURS SCHEDULED
Start: 2025-05-06

## 2025-05-06 RX ORDER — MAGNESIUM SULFATE HEPTAHYDRATE 40 MG/ML
4 INJECTION, SOLUTION INTRAVENOUS ONCE
Status: COMPLETED | OUTPATIENT
Start: 2025-05-06 | End: 2025-05-06

## 2025-05-06 RX ADMIN — URSODIOL 300 MG: 300 CAPSULE ORAL at 09:35

## 2025-05-06 RX ADMIN — SPIRONOLACTONE 25 MG: 25 TABLET ORAL at 09:35

## 2025-05-06 RX ADMIN — MAGNESIUM SULFATE HEPTAHYDRATE 4 G: 40 INJECTION, SOLUTION INTRAVENOUS at 11:06

## 2025-05-06 RX ADMIN — MIDODRINE HYDROCHLORIDE 10 MG: 5 TABLET ORAL at 06:40

## 2025-05-06 RX ADMIN — DAPSONE 50 MG: 25 TABLET ORAL at 09:36

## 2025-05-06 RX ADMIN — POTASSIUM CHLORIDE 20 MEQ: 1500 TABLET, EXTENDED RELEASE ORAL at 09:35

## 2025-05-06 RX ADMIN — RIFAXIMIN 550 MG: 550 TABLET ORAL at 12:24

## 2025-05-06 RX ADMIN — LACTULOSE 30 G: 10 SOLUTION ORAL at 12:24

## 2025-05-06 RX ADMIN — MIDODRINE HYDROCHLORIDE 10 MG: 5 TABLET ORAL at 12:24

## 2025-05-06 RX ADMIN — OXYCODONE HYDROCHLORIDE 10 MG: 10 TABLET ORAL at 09:35

## 2025-05-06 RX ADMIN — PANTOPRAZOLE SODIUM 40 MG: 40 TABLET, DELAYED RELEASE ORAL at 06:40

## 2025-05-06 RX ADMIN — LACTULOSE 30 G: 10 SOLUTION ORAL at 09:34

## 2025-05-06 RX ADMIN — TORSEMIDE 20 MG: 20 TABLET ORAL at 09:35

## 2025-05-06 NOTE — ASSESSMENT & PLAN NOTE
-Likely due to liver disease-per primary team  - Dapsone management per primary team.  Appears to be on dapsone due to history of sarcoidosis and query intermittently has required holding due to leukopenia;

## 2025-05-06 NOTE — ASSESSMENT & PLAN NOTE
-Possibly orthostasis and volume depletion  - Was initially significantly orthostatic on 5/30 which appears to be improving on 5/4.  On 5/5 orthostatic vital signs were negative, continue to monitor orthostatic Eitel signs especially with the patient being restarted on diuretics.

## 2025-05-06 NOTE — NURSING NOTE
"Upon assisting patient to the bathroom tonight with a walker, patient became unsteady, after sitting patient down on the bed and calling for extra assistance, patient ambulated to the bathroom using a walker with minimal assistance. When patient sat on the toilet, she sat forcefully and stated \"I just fell\".   Provider aware of situation.   "

## 2025-05-06 NOTE — ASSESSMENT & PLAN NOTE
- The patient's blood pressure has been stable and is maintained on midodrine 10 mg 3 times daily.  It is not clear if the patient had been taking midodrine at home or not.  Would hold lisinopril on discharge. Would see if metoprolol could be used instead of carvedilol if this was needed as the patient is on midodrine which is an alpha agonist on carvedilol as alpha and beta blocker properties and the midodrine will counteract the effects of the carvedilol.  The carvedilol had been used in reviewing family medicine notes as an outpatient for the treatment of chronic diastolic heart failure.

## 2025-05-06 NOTE — PROGRESS NOTES
Patient:    MRN:  [id number]    Aidin Request ID:  [id number]    Level of care reserved:  [facility]    Partner Reserved:  [facility] [place] [address] [phone]    Clinical needs requested:    Geography searched:  [id number]    Start of Service:    Request sent:  9:44am EDT on 5/5/2025 by Olga Benitez    Partner reserved:  2:37pm EDT on 5/6/2025 by Olga Benitez    Choice list shared:  12:32pm EDT on 5/5/2025 by Olga Benitez

## 2025-05-06 NOTE — PLAN OF CARE
Problem: Potential for Falls  Goal: Patient will remain free of falls  Description: INTERVENTIONS:- Educate patient/family on patient safety including physical limitations- Instruct patient to call for assistance with activity - Consult OT/PT to assist with strengthening/mobility - Keep Call bell within reach- Keep bed low and locked with side rails adjusted as appropriate- Keep care items and personal belongings within reach- Initiate and maintain comfort rounds- Make Fall Risk Sign visible to staff- Offer Toileting every 3 Hours, in advance of need- Initiate/Maintain bed alarm- Obtain necessary fall risk management equipment: yellow socks - Apply yellow socks and bracelet for high fall risk patients- Consider moving patient to room near nurses station  INTERVENTIONS:- Educate patient/family on patient safety including physical limitations- Instruct patient to call for assistance with activity - Consult OT/PT to assist with strengthening/mobility - Keep Call bell within reach- Keep bed low and locked with side rails adjusted as appropriate- Keep care items and personal belongings within reach- Initiate and maintain comfort rounds- Make Fall Risk Sign visible to staff- Offer Toileting every 3 Hours, in advance of need- Initiate/Maintain bed alarm- Obtain necessary fall risk management equipment: yellow socks - Apply yellow socks and bracelet for high fall risk patients- Consider moving patient to room near nurses station  Outcome: Progressing     Problem: Prexisting or High Potential for Compromised Skin Integrity  Goal: Skin integrity is maintained or improved  Description: INTERVENTIONS:- Identify patients at risk for skin breakdown- Assess and monitor skin integrity- Assess and monitor nutrition and hydration status- Monitor labs - Assess for incontinence - Turn and reposition patient- Assist with mobility/ambulation- Relieve pressure over bony prominences- Avoid friction and shearing- Provide appropriate  hygiene as needed including keeping skin clean and dry- Evaluate need for skin moisturizer/barrier cream- Collaborate with interdisciplinary team - Patient/family teaching- Consider wound care consult   Outcome: Progressing     Problem: PAIN - ADULT  Goal: Verbalizes/displays adequate comfort level or baseline comfort level  Description: Interventions:- Encourage patient to monitor pain and request assistance- Assess pain using appropriate pain scale- 0-10Administer analgesics based on type and severity of pain and evaluate response- Implement non-pharmacological measures as appropriate and evaluate response- Consider cultural and social influences on pain and pain management- Notify physician/advanced practitioner if interventions unsuccessful or patient reports new pain  Outcome: Progressing     Problem: INFECTION - ADULT  Goal: Absence or prevention of progression during hospitalization  Description: INTERVENTIONS:- Assess and monitor for signs and symptoms of infection- Monitor lab/diagnostic results- Monitor all insertion sites, i.e. indwelling lines, tubes, and drains- Monitor endotracheal if appropriate and nasal secretions for changes in amount and color- [place] appropriate cooling/warming therapies per order- Administer medications as ordered- Instruct and encourage patient and family to use good hand hygiene technique- Identify and instruct in appropriate isolation precautions for identified infection/condition  Outcome: Progressing  Goal: Absence of fever/infection during neutropenic period  Description: INTERVENTIONS:- Monitor WBC  Outcome: Progressing     Problem: SAFETY ADULT  Goal: Patient will remain free of falls  Description: INTERVENTIONS:- Educate patient/family on patient safety including physical limitations- Instruct patient to call for assistance with activity - Consult OT/PT to assist with strengthening/mobility - Keep Call bell within reach- Keep bed low and locked with side rails  adjusted as appropriate- Keep care items and personal belongings within reach- Initiate and maintain comfort rounds- Make Fall Risk Sign visible to staff- Offer Toileting every 3 Hours, in advance of need- Initiate/Maintain bed alarm- Obtain necessary fall risk management equipment: yellow socks - Apply yellow socks and bracelet for high fall risk patients- Consider moving patient to room near nurses station  INTERVENTIONS:- Educate patient/family on patient safety including physical limitations- Instruct patient to call for assistance with activity - Consult OT/PT to assist with strengthening/mobility - Keep Call bell within reach- Keep bed low and locked with side rails adjusted as appropriate- Keep care items and personal belongings within reach- Initiate and maintain comfort rounds- Make Fall Risk Sign visible to staff- Offer Toileting every 3 Hours, in advance of need- Initiate/Maintain bed alarm- Obtain necessary fall risk management equipment: yellow socks - Apply yellow socks and bracelet for high fall risk patients- Consider moving patient to room near nurses station  Outcome: Progressing  Goal: Maintain or return to baseline ADL function  Description: INTERVENTIONS:-  Assess patient's ability to carry out ADLs; assess patient's baseline for ADL function and identify physical deficits which impact ability to perform ADLs (bathing, care of mouth/teeth, toileting, grooming, dressing, etc.)- Assess/evaluate cause of self-care deficits - Assess range of motion- Assess patient's mobility; develop plan if impaired- Assess patient's need for assistive devices and provide as appropriate- Encourage maximum independence but intervene and supervise when necessary- Involve family in performance of ADLs- Assess for home care needs following discharge - Consider OT consult to assist with ADL evaluation and planning for discharge- Provide patient education as appropriate  Outcome: Progressing  Goal: Maintains/Returns to  pre admission functional level  Description: INTERVENTIONS:- Perform AM-PAC 6 Click Basic Mobility/ Daily Activity assessment daily.- Set and communicate daily mobility goal to care team and patient/family/caregiver. - Collaborate with rehabilitation services on mobility goals if consulted- Perform Range of Motion 3 times a day.- Reposition patient every 2 hours.- Dangle patient 3 times a day- Stand patient 3 times a day- Ambulate patient 3 times a day- Out of bed to chair 3 times a day - Out of bed for meals 3 times a day- Out of bed for toileting- Record patient progress and toleration of activity level   Outcome: Progressing     Problem: DISCHARGE PLANNING  Goal: Discharge to home or other facility with appropriate resources  Description: INTERVENTIONS:- Identify barriers to discharge w/patient and caregiver- Arrange for needed discharge resources and transportation as appropriate- Identify discharge learning needs (meds, wound care, etc.)- Arrange for interpretive services to assist at discharge as needed- Refer to Case Management Department for coordinating discharge planning if the patient needs post-hospital services based on physician/advanced practitioner order or complex needs related to functional status, cognitive ability, or social support system  Outcome: Progressing     Problem: Knowledge Deficit  Goal: Patient/family/caregiver demonstrates understanding of disease process, treatment plan, medications, and discharge instructions  Description: Complete learning assessment and assess knowledge base.Interventions:- Provide teaching at level of understanding- Provide teaching via preferred learning methods  Outcome: Progressing

## 2025-05-06 NOTE — PLAN OF CARE
Problem: Potential for Falls  Goal: Patient will remain free of falls  Description: INTERVENTIONS:- Educate patient/family on patient safety including physical limitations- Instruct patient to call for assistance with activity - Consult OT/PT to assist with strengthening/mobility - Keep Call bell within reach- Keep bed low and locked with side rails adjusted as appropriate- Keep care items and personal belongings within reach- Initiate and maintain comfort rounds- Make Fall Risk Sign visible to staff- Offer Toileting every 3 Hours, in advance of need- Initiate/Maintain bed alarm- Obtain necessary fall risk management equipment: yellow socks - Apply yellow socks and bracelet for high fall risk patients- Consider moving patient to room near nurses station  INTERVENTIONS:- Educate patient/family on patient safety including physical limitations- Instruct patient to call for assistance with activity - Consult OT/PT to assist with strengthening/mobility - Keep Call bell within reach- Keep bed low and locked with side rails adjusted as appropriate- Keep care items and personal belongings within reach- Initiate and maintain comfort rounds- Make Fall Risk Sign visible to staff- Offer Toileting every 3 Hours, in advance of need- Initiate/Maintain bed alarm- Obtain necessary fall risk management equipment: yellow socks - Apply yellow socks and bracelet for high fall risk patients- Consider moving patient to room near nurses station  Outcome: Progressing     Problem: Prexisting or High Potential for Compromised Skin Integrity  Goal: Skin integrity is maintained or improved  Description: INTERVENTIONS:- Identify patients at risk for skin breakdown- Assess and monitor skin integrity- Assess and monitor nutrition and hydration status- Monitor labs - Assess for incontinence - Turn and reposition patient- Assist with mobility/ambulation- Relieve pressure over bony prominences- Avoid friction and shearing- Provide appropriate  hygiene as needed including keeping skin clean and dry- Evaluate need for skin moisturizer/barrier cream- Collaborate with interdisciplinary team - Patient/family teaching- Consider wound care consult   Outcome: Progressing     Problem: PAIN - ADULT  Goal: Verbalizes/displays adequate comfort level or baseline comfort level  Description: Interventions:- Encourage patient to monitor pain and request assistance- Assess pain using appropriate pain scale- 0-10Administer analgesics based on type and severity of pain and evaluate response- Implement non-pharmacological measures as appropriate and evaluate response- Consider cultural and social influences on pain and pain management- Notify physician/advanced practitioner if interventions unsuccessful or patient reports new pain  Outcome: Progressing     Problem: INFECTION - ADULT  Goal: Absence or prevention of progression during hospitalization  Description: INTERVENTIONS:- Assess and monitor for signs and symptoms of infection- Monitor lab/diagnostic results- Monitor all insertion sites, i.e. indwelling lines, tubes, and drains- Monitor endotracheal if appropriate and nasal secretions for changes in amount and color- [place] appropriate cooling/warming therapies per order- Administer medications as ordered- Instruct and encourage patient and family to use good hand hygiene technique- Identify and instruct in appropriate isolation precautions for identified infection/condition  Outcome: Progressing  Goal: Absence of fever/infection during neutropenic period  Description: INTERVENTIONS:- Monitor WBC  Outcome: Progressing     Problem: SAFETY ADULT  Goal: Patient will remain free of falls  Description: INTERVENTIONS:- Educate patient/family on patient safety including physical limitations- Instruct patient to call for assistance with activity - Consult OT/PT to assist with strengthening/mobility - Keep Call bell within reach- Keep bed low and locked with side rails  adjusted as appropriate- Keep care items and personal belongings within reach- Initiate and maintain comfort rounds- Make Fall Risk Sign visible to staff- Offer Toileting every 3 Hours, in advance of need- Initiate/Maintain bed alarm- Obtain necessary fall risk management equipment: yellow socks - Apply yellow socks and bracelet for high fall risk patients- Consider moving patient to room near nurses station  INTERVENTIONS:- Educate patient/family on patient safety including physical limitations- Instruct patient to call for assistance with activity - Consult OT/PT to assist with strengthening/mobility - Keep Call bell within reach- Keep bed low and locked with side rails adjusted as appropriate- Keep care items and personal belongings within reach- Initiate and maintain comfort rounds- Make Fall Risk Sign visible to staff- Offer Toileting every 3 Hours, in advance of need- Initiate/Maintain bed alarm- Obtain necessary fall risk management equipment: yellow socks - Apply yellow socks and bracelet for high fall risk patients- Consider moving patient to room near nurses station  Outcome: Progressing  Goal: Maintain or return to baseline ADL function  Description: INTERVENTIONS:-  Assess patient's ability to carry out ADLs; assess patient's baseline for ADL function and identify physical deficits which impact ability to perform ADLs (bathing, care of mouth/teeth, toileting, grooming, dressing, etc.)- Assess/evaluate cause of self-care deficits - Assess range of motion- Assess patient's mobility; develop plan if impaired- Assess patient's need for assistive devices and provide as appropriate- Encourage maximum independence but intervene and supervise when necessary- Involve family in performance of ADLs- Assess for home care needs following discharge - Consider OT consult to assist with ADL evaluation and planning for discharge- Provide patient education as appropriate  Outcome: Progressing  Goal: Maintains/Returns to  pre admission functional level  Description: INTERVENTIONS:- Perform AM-PAC 6 Click Basic Mobility/ Daily Activity assessment daily.- Set and communicate daily mobility goal to care team and patient/family/caregiver. - Collaborate with rehabilitation services on mobility goals if consulted- Perform Range of Motion 3 times a day.- Reposition patient every 2 hours.- Dangle patient 3 times a day- Stand patient 3 times a day- Ambulate patient 3 times a day- Out of bed to chair 3 times a day - Out of bed for meals 3 times a day- Out of bed for toileting- Record patient progress and toleration of activity level   Outcome: Progressing     Problem: DISCHARGE PLANNING  Goal: Discharge to home or other facility with appropriate resources  Description: INTERVENTIONS:- Identify barriers to discharge w/patient and caregiver- Arrange for needed discharge resources and transportation as appropriate- Identify discharge learning needs (meds, wound care, etc.)- Arrange for interpretive services to assist at discharge as needed- Refer to Case Management Department for coordinating discharge planning if the patient needs post-hospital services based on physician/advanced practitioner order or complex needs related to functional status, cognitive ability, or social support system  Outcome: Progressing     Problem: Knowledge Deficit  Goal: Patient/family/caregiver demonstrates understanding of disease process, treatment plan, medications, and discharge instructions  Description: Complete learning assessment and assess knowledge base.Interventions:- Provide teaching at level of understanding- Provide teaching via preferred learning methods  Outcome: Progressing

## 2025-05-06 NOTE — ASSESSMENT & PLAN NOTE
-Admission creatinine 1.6 mg/dL and peak creatinine 1.9 mg/dL on 5/3  - Baseline creatinine is around 1 mg/dL  - Urinalysis bland no blood or protein noted on UA on May 2.  - CT scan without obstruction    Course of stay-patient was initially started on low-dose intravenous volume expansion.  Diuretics were held.  ACE inhibitor was held.  Was restarted on midodrine for supportive measures though there was question if the patient was taking at home appropriately.    5/4-intravenous fluids were held.  Received albumin.  5/5-creatinine improving to baseline.  Electrolytes appropriate.  Restarted on diuretics by primary team.    Regarding plan of care as of today on day 6: Continuing to monitor the patient on diuretics were recently started including Aldactone and torsemide that were started on May 5.  The patient's creatinine as of today on day 6 is 1.04.  CO2 is elevated to 34.  Will check venous blood gas.  Also continue to monitor orthostatic vital signs.  Hemoglobin levels are stable in that range.  Patient is noted to have pancytopenia being worked up primary team.  Would also see if metoprolol could be substituted for carvedilol if needed as the patient is on midodrine which is an alpha agonist which would counteract the effect of carvedilol which has both alpha and beta blockade properties.  Would also clarify the reason for the metoprolol/query if for reduction of portal venous pressures versus other etiology.  Will try to minimize antihypertensive medications especially with significant orthostatic hypotension requiring midodrine which may be a sequela of advanced liver disease with decreased SVR contributing to orthostatic hypotension requiring midodrine.  In reviewing family medicine notes it is noted patient was on carvedilol for chronic diastolic heart failure.  We could substitute low-dose metoprolol here.  I discontinued carvedilol and started metoprolol 12.5 mg twice daily with hold parameters.

## 2025-05-06 NOTE — DISCHARGE SUMMARY
Discharge Summary - Hospitalist   Name: Bailey Olsen 54 y.o. female I MRN: [id number]  Unit/Bed#: -01 I Date of Admission: 5/2/2025   Date of Service: 5/6/2025 I Hospital Day: 3     Assessment & Plan  Falls  Presented after a fall at home.  Trauma workup including CT head and cervical spine is negative for acute abnormality  Suspect could be postural orthostatic  Patient initially started on IV hydration.  Subsequently upon improvement in creatinine IV fluids were stopped by nephrology .restarted  diuretics yesterday.    Apparently patient is on coreg and not atenolol from chart PCP review and also on lisinopril for htn and questionable if she was taking midodrine-questionable having orthostatic hypotension  PT OT evaluated and recommended level 3- patient declined to go to rehab   Stable for discharge home with home health care today.  Liver cirrhosis secondary to HYATT (nonalcoholic steatohepatitis) (HCC)  With hyperammonemia but there is no hepatic encephalopathy-tremors , patient ammonia now was normal already had 2 bms on 30 g q3 hrs will change to home dose 30 g qid and aim at 3-4 a day   Restarted aldactone and tosemide  Restarted coreg   Continue Xifaxan  Pancytopenia (HCC)  Secondary to liver cirrhosis with platelets being less than 70,000 we will hold heparin hemoglobin is stable is also has associated iron deficiency anemia is greater than 8 pancytopenia secondary to liver cirrhosis  Chronic, continuous use of opioids  She is on oxycodone 10 every 6 hours as needed secondary to chronic low back pain  Chest pain  For 3 days denies falling forward tender on palpation also on breathing suspected secondary to the healing and now rib fracture she also had some opacities in both lungs which are now improving could be pneumonitis also could be costochondritis nonischemic EKG troponin is negative will proceed with a heating pad and Lidoderm patch  Slight improvement  Abdominal pain  Right side could be  radiation down from the infiltration pneumonitis could be muscular skeletal as there is a little bruise on the right side she is postcholecystectomy no obvious abnormality on CT abdomen and pelvis could be radiation down from broken ribs will place with a Lidoderm patch.  And a heating pad  5/4 resolved on palpation no tenderness  Acute kidney injury (HCC)  Creatinine is 1.6 normal is 0.9-1  questionable HRS questionable ATN hrs, s/p 5 doses of albumin , and on midodrine   Brian resolved  Primary hypertension  Borderline low normal blood pressure continue with metoprolol with holding parameters.  Hypomagnesemia  Replaced.     Medical Problems       Resolved Problems  Date Reviewed: 5/4/2025   None         MESSAGE TO PCP (Nayely Brown DO) FOR FOLLOW UP:   Thank you for allowing us to participate in the care of your patient, Bailey Olsen, who was hospitalized from 5/2/2025 through 05/06/25 with the admitting diagnosis of fall, elevated creatinine,.  Encephalopathy.  Improved with treatment  If you have any additional questions or would like to discuss further, please feel free to contact me.  Mar Oliva MD  [facility] Internal Medicine, Hospitalist, [phone]     Admission Date:   Admission Orders (From admission, onward)       Ordered        05/03/25 0758  INPATIENT ADMISSION  Once            05/02/25 1156  Place in Observation  Once                          Discharge Date: 05/06/25    Consultations During Hospital Stay:  Nephrology    Procedures Performed:   CT head and cervical spine    Significant Findings / Test Results:   CT chest abdomen and No acute abnormality. Healing/subacute right anterior tenth rib fracture at the costochondral junction, unchanged from recent prior CT although new from 2/27/2025.     Patchy groundglass opacities in bilateral anterior upper lobes appears improved in comparison to prior CT dated 4/21/2025, likely infectious/inflammatory.     Cirrhotic liver morphology with  "stigmata of portal hypertension including splenomegaly and small ascites.    Incidental Findings:   NA       Test Results Pending at Discharge (will require follow up):   NA     Complications:  NOne     Reason for Admission: fall    Hospital Course:   Bailey Olsen is a 54 y.o. female patient who originally presented to the hospital on 5/2/2025 due to elevated creatinine, fall, confusion.  Traumatic workup was negative in the emergency room.  Monitor off diuretics with gentle IV hydration with improvement in creatinine.  Subsequently diuretics were resumed when renal functions remained stable.  Received lactulose with improvement in mentation.  Stable for discharge home with home health care today.          Please see above list of diagnoses and related plan for additional information.     Condition at Discharge: stable    Discharge Day Visit / Exam:   Subjective: Patient seen and examined at bedside.  Mentation is back to baseline.  Does not offer any complaints.  No acute events overnight.  Vitals: Blood Pressure: (!) 100/48 (05/06/25 1220)  Pulse: 98 (05/06/25 1220)  Temperature: (!) 97.2 °F (36.2 °C) (05/06/25 0642)  Temp Source: Temporal (05/05/25 0612)  Respirations: 18 (05/06/25 0642)  Height: 5' 5\" (165.1 cm) (05/02/25 1321)  Weight - Scale: 112 kg (247 lb 9.2 oz) (05/06/25 0642)  SpO2: 94 % (05/06/25 0642)  Physical Exam  Constitutional:       Appearance: She is obese.   HENT:      Head: Normocephalic.      Mouth/Throat:      Mouth: Mucous membranes are moist.   Eyes:      Extraocular Movements: Extraocular movements intact.      Pupils: Pupils are equal, round, and reactive to light.   Cardiovascular:      Rate and Rhythm: Normal rate and regular rhythm.   Pulmonary:      Effort: Pulmonary effort is normal.   Abdominal:      General: Bowel sounds are normal. There is no distension.      Palpations: Abdomen is soft.      Tenderness: There is no abdominal tenderness.   Musculoskeletal:      Cervical back: " Neck supple.      Right lower leg: No edema.      Left lower leg: No edema.   Skin:     General: Skin is warm.   Neurological:      General: No focal deficit present.      Mental Status: She is alert and oriented to person, place, and time. Mental status is at baseline.          Discussion with Family: Attempted to update  (sister) via phone. Left voicemail.     Discharge instructions/Information to patient and family:   See after visit summary for information provided to patient and family.      Provisions for Follow-Up Care:  See after visit summary for information related to follow-up care and any pertinent home health orders.      Mobility at time of Discharge:   Basic Mobility Inpatient Raw Score: 20  JH-HLM Goal: 6: Walk 10 steps or more  JH-HLM Achieved: 6: Walk 10 steps or more  HLM Goal achieved. Continue to encourage appropriate mobility.     Disposition:   Home with VNA Services (Reminder: Complete face to face encounter)    Planned Readmission: no    Discharge Medications:  See after visit summary for reconciled discharge medications provided to patient and/or family.      Administrative Statements   Discharge Statement:  I have spent a total time of >30 minutes in caring for this patient on the day of the visit/encounter. >30 minutes of time was spent on: Diagnostic results, Prognosis, Risks and benefits of tx options, Instructions for management, Importance of tx compliance, Impressions, Counseling / Coordination of care, Documenting in the medical record, Reviewing / ordering tests, medicine, procedures  , and Communicating with other healthcare professionals .    **Please Note: This note may have been constructed using a voice recognition system**

## 2025-05-06 NOTE — CASE MANAGEMENT
Case Management Discharge Planning Note    Patient name Bailey Olsen  Location /-01 MRN [id number]  : 1970 Date 2025       Current Admission Date: 2025  Current Admission Diagnosis:Falls   Patient Active Problem List    Diagnosis Date Noted Date Diagnosed    Falls 2025     Chest pain 2025     Abdominal pain 2025     Acute kidney injury (HCC) 2025     Gram-negative bacteremia 2025     Acute respiratory failure (HCC) 2025     Carcinoid tumor of pancreas 2025     Hypomagnesemia 2025     Upper GI bleed 2025     Acute anemia 2025     Rectal bleeding 2025     Acute blood loss anemia 2025     Chronic, continuous use of opioids 2025     Acute metabolic encephalopathy 2025     SIRS (systemic inflammatory response syndrome) (HCC) 2025     Sacral fracture, closed (HCC) 11/15/2024     KISHORE (acute kidney injury) (HCC) 10/04/2024     Morbid obesity with BMI of 40.0-44.9, adult (HCC) 10/04/2024     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) 2024     Fall from standing 2024     Closed compression fracture of L1 lumbar vertebra, sequela 2024     Closed stable burst fracture of first lumbar vertebra with routine healing 2024     Groin hematoma 2024     Ambulatory dysfunction 2024     Intractable back pain 2024     Hyperammonemia (HCC) 2024     Hepatic encephalopathy (HCC) 2024     Fall 2024     Liver cirrhosis secondary to HYATT (nonalcoholic steatohepatitis) (HCC) 2024     History of malignant carcinoid tumor of small intestine 2024     Class 3 severe obesity due to excess calories in adult 2024     Primary hypertension 2024     Hypokalemia 2024     Fibromyalgia 2024     Iron deficiency anemia 2024     Pancytopenia (HCC) 2024     Sarcoidosis 2024     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       LOS (days): 3  Geometric Mean LOS (GMLOS) (days): 3  Days to GMLOS:-0.1     OBJECTIVE:  Risk of Unplanned Readmission Score: 65.61         Current admission status: Inpatient   Preferred Pharmacy:   [id number] - [place] - [address]  [address] [facility] [address]  Phone: [phone] Fax: [phone]    [id number] - [place], [place] - [address] [facility]  [address] [facility] [address]  Phone: [phone] Fax: [phone]    Primary Care Provider: Nayely Brown DO    Primary Insurance: MEDICARE  Secondary Insurance: BLUE CROSS    DISCHARGE DETAILS:              CM met with patient to review discharge with [facility].  Patient indicated she has family who can provide transport home later today.    CM spoke to patient at the bedside, reviewed DC IMM with patient and informed that patient can stay an additional 4 hours for reconsidering appealing the discharge as the medicare rights were review on the day of discharge. Pt verbalized understanding and feels ready to go home and does not intend to stay 4 hours to reconsider.  IMM placed in bin for filing.                                             Transport at Discharge : Family                             IMM Given (Date):: 05/06/25  IMM Given to:: Patient  Family notified:: appeal rights provided to patient

## 2025-05-06 NOTE — ASSESSMENT & PLAN NOTE
-Was noted to have tenderness to palpation on chest wall.  Felt to be more due to musculoskeletal regular than coronary related.  Further plans per primary team.  The patient denied any chest pain for me when I saw her on May 6.

## 2025-05-06 NOTE — CASE MANAGEMENT
Case Management Discharge Planning Note    Patient name Bailey Olsen  Location /-01 MRN [id number]  : 1970 Date 2025       Current Admission Date: 2025  Current Admission Diagnosis:Falls   Patient Active Problem List    Diagnosis Date Noted Date Diagnosed    Falls 2025     Chest pain 2025     Abdominal pain 2025     Acute kidney injury (HCC) 2025     Gram-negative bacteremia 2025     Acute respiratory failure (HCC) 2025     Carcinoid tumor of pancreas 2025     Hypomagnesemia 2025     Upper GI bleed 2025     Acute anemia 2025     Rectal bleeding 2025     Acute blood loss anemia 2025     Chronic, continuous use of opioids 2025     Acute metabolic encephalopathy 2025     SIRS (systemic inflammatory response syndrome) (HCC) 2025     Sacral fracture, closed (HCC) 11/15/2024     KISHORE (acute kidney injury) (HCC) 10/04/2024     Morbid obesity with BMI of 40.0-44.9, adult (HCC) 10/04/2024     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) 2024     Fall from standing 2024     Closed compression fracture of L1 lumbar vertebra, sequela 2024     Closed stable burst fracture of first lumbar vertebra with routine healing 2024     Groin hematoma 2024     Ambulatory dysfunction 2024     Intractable back pain 2024     Hyperammonemia (HCC) 2024     Hepatic encephalopathy (HCC) 2024     Fall 2024     Liver cirrhosis secondary to HYATT (nonalcoholic steatohepatitis) (HCC) 2024     History of malignant carcinoid tumor of small intestine 2024     Class 3 severe obesity due to excess calories in adult 2024     Primary hypertension 2024     Hypokalemia 2024     Fibromyalgia 2024     Iron deficiency anemia 2024     Pancytopenia (HCC) 2024     Sarcoidosis 2024     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       LOS (days): 3  Geometric Mean LOS (GMLOS) (days): 3  Days to GMLOS:-0.3     OBJECTIVE:  Risk of Unplanned Readmission Score: 66.24         Current admission status: Inpatient   Preferred Pharmacy:   [id number] - [place] - [address] [facility]  [address] [facility] [address]  Phone: [phone] Fax: [phone]    [id number] - [place] - [facility] [address]  Phone: [phone] Fax: [phone]    Primary Care Provider: Nayely Brown DO    Primary Insurance: MEDICARE  Secondary Insurance: BLUE CROSS    DISCHARGE DETAILS:              HHC order and AVS attached in [facility] for [facility]

## 2025-05-06 NOTE — ASSESSMENT & PLAN NOTE
Magnesium 1.4 likely diuretic related.  Will replace IV if it has not been replaced.  Potassium level stable at 3.7, phosphorus level was 3.9.    Administrative Statements   VIRTUAL CARE DOCUMENTATION:     1. This service was provided via Telemedicine using [employer]     2. Parties in the room with patient during teleconsult Patient only    3. Confidentiality My office door was closed     4. Participants No one else was in the room    5. Patient acknowledged consent and understanding of privacy and security of the  Telemedicine consult. I informed the patient that I have reviewed their record in Epic and presented the opportunity for them to ask any questions regarding the visit today.  The patient agreed to participate.    6. I have spent a total time of approximately 25 minutes in caring for this patient on the day of the visit/encounter including documentation of medical decision making, modification of assessment and plan, review of patient record, patient visit, brief communication with patient's nurse this morning via epic secure chat, order placement not including the time spent for establishing the audio/video connection.

## 2025-05-06 NOTE — ASSESSMENT & PLAN NOTE
Presented after a fall at home.  Trauma workup including CT head and cervical spine is negative for acute abnormality  Suspect could be postural orthostatic  Patient initially started on IV hydration.  Subsequently upon improvement in creatinine IV fluids were stopped by nephrology .restarted  diuretics yesterday.    Apparently patient is on coreg and not atenolol from chart PCP review and also on lisinopril for htn and questionable if she was taking midodrine-questionable having orthostatic hypotension  PT OT evaluated and recommended level 3- patient declined to go to rehab   Stable for discharge home with home health care today.

## 2025-05-06 NOTE — PROGRESS NOTES
NEPHROLOGY HOSPITAL PROGRESS NOTE   Bailey Olsen 54 y.o. female MRN: [id number]  Unit/Bed#: -01 Encounter: [id number]  Reason for Consult: Acute kidney injury and fluid status.    Assessment & Plan  Acute kidney injury (HCC)  -Admission creatinine 1.6 mg/dL and peak creatinine 1.9 mg/dL on 5/3  - Baseline creatinine is around 1 mg/dL  - Urinalysis bland no blood or protein noted on UA on May 2.  - CT scan without obstruction    Course of stay-patient was initially started on low-dose intravenous volume expansion.  Diuretics were held.  ACE inhibitor was held.  Was restarted on midodrine for supportive measures though there was question if the patient was taking at home appropriately.    5/4-intravenous fluids were held.  Received albumin.  5/5-creatinine improving to baseline.  Electrolytes appropriate.  Restarted on diuretics by primary team.    Regarding plan of care as of today on day 6: Continuing to monitor the patient on diuretics were recently started including Aldactone and torsemide that were started on May 5.  The patient's creatinine as of today on day 6 is 1.04.  CO2 is elevated to 34.  Will check venous blood gas.  Also continue to monitor orthostatic vital signs.  Hemoglobin levels are stable in that range.  Patient is noted to have pancytopenia being worked up primary team.  Would also see if metoprolol could be substituted for carvedilol if needed as the patient is on midodrine which is an alpha agonist which would counteract the effect of carvedilol which has both alpha and beta blockade properties.  Would also clarify the reason for the metoprolol/query if for reduction of portal venous pressures versus other etiology.  Will try to minimize antihypertensive medications especially with significant orthostatic hypotension requiring midodrine which may be a sequela of advanced liver disease with decreased SVR contributing to orthostatic hypotension requiring midodrine.  In reviewing family  medicine notes it is noted patient was on carvedilol for chronic diastolic heart failure.  We could substitute low-dose metoprolol here.  I discontinued carvedilol and started metoprolol 12.5 mg twice daily with hold parameters.  Falls  -Possibly orthostasis and volume depletion  - Was initially significantly orthostatic on 5/30 which appears to be improving on 5/4.  On 5/5 orthostatic vital signs were negative, continue to monitor orthostatic Eitel signs especially with the patient being restarted on diuretics.  Liver cirrhosis secondary to HYATT (nonalcoholic steatohepatitis) (HCC)  -There was concern with compliance with lactulose at home?  - Was started on lactulose while inpatient  - Continued on Xifaxan  Pancytopenia (HCC)  -Likely due to liver disease-per primary team  - Dapsone management per primary team.  Appears to be on dapsone due to history of sarcoidosis and query intermittently has required holding due to leukopenia;   Chest pain  -Was noted to have tenderness to palpation on chest wall.  Felt to be more due to musculoskeletal regular than coronary related.  Further plans per primary team.  The patient denied any chest pain for me when I saw her on May 6.  Abdominal pain  -Unclear if related to pneumonitis versus other  Primary hypertension  - The patient's blood pressure has been stable and is maintained on midodrine 10 mg 3 times daily.  It is not clear if the patient had been taking midodrine at home or not.  Would hold lisinopril on discharge. Would see if metoprolol could be used instead of carvedilol if this was needed as the patient is on midodrine which is an alpha agonist on carvedilol as alpha and beta blocker properties and the midodrine will counteract the effects of the carvedilol.  The carvedilol had been used in reviewing family medicine notes as an outpatient for the treatment of chronic diastolic heart failure.  Hypomagnesemia  Magnesium 1.4 likely diuretic related.  Will replace IV  if it has not been replaced.  Potassium level stable at 3.7, phosphorus level was 3.9.    Administrative Statements   VIRTUAL CARE DOCUMENTATION:     1. This service was provided via Telemedicine using 911 View TV Kit     2. Parties in the room with patient during teleconsult Patient only    3. Confidentiality My office door was closed     4. Participants No one else was in the room    5. Patient acknowledged consent and understanding of privacy and security of the  Telemedicine consult. I informed the patient that I have reviewed their record in Epic and presented the opportunity for them to ask any questions regarding the visit today.  The patient agreed to participate.    6. I have spent a total time of approximately 25 minutes in caring for this patient on the day of the visit/encounter including documentation of medical decision making, modification of assessment and plan, review of patient record, patient visit, brief communication with patient's nurse this morning via epic secure chat, order placement not including the time spent for establishing the audio/video connection.          SUBJECTIVE / 24H INTERVAL HISTORY:  Ms Olsen is seen in follow-up for acute kidney injury and fluid management.  The patient states that she is feeling better but still notices dizziness with standing up although better than it had been.  It was noted at home that she multiple falls likely/possibly orthostatic in nature.  She also noted that she was not currently driving right now given all that was going on medically.  She is feeling better and she was recently started back on low-dose Aldactone and torsemide.  I communicated with the patient's nurse he had pictures this morning, no acute vents noted overnight.    Regarding hemodynamics, 1 May for systolic blood pressure has been anywhere from 109-131 systolic.  With regards to orthostatic vital signs done today on May 5, lying her blood pressure is 123/70, and standing her blood  pressure was 123/73 with pulse rate in the 90s.        OBJECTIVE:  Current Weight: Weight - Scale: 112 kg (247 lb 9.2 oz)  Vitals:    05/05/25 2141 05/05/25 2142 05/05/25 2150 05/06/25 0642   BP: 112/68 109/67 131/76 120/73   BP Location: Left arm Left arm Left arm    Pulse: 94 97  87   Resp:    18   Temp:    (!) 97.2 °F (36.2 °C)   TempSrc:       SpO2: 91% 92%  94%   Weight:    112 kg (247 lb 9.2 oz)   Height:           Intake/Output Summary (Last 24 hours) at 5/6/2025 0900  Last data filed at 5/6/2025 0601  Gross per 24 hour   Intake 1080 ml   Output 300 ml   Net 780 ml     General: Patient is resting comfortably no acute distress.  HEENT: Normocephalic atraumatic  Neck: Appears to be supple  Pulmonary: There is no accessory muscle use/no conversational dyspnea noted  Cardiac pulse ranges in the 90s  Extremities: There is 1+ lower extremity edema noted.  Neurologic: The patient is alert and oriented x 3.  Medications:    Current Facility-Administered Medications:     amitriptyline (ELAVIL) tablet 50 mg, 50 mg, Oral, HS, Maryjane Whatley MD, 50 mg at 05/05/25 2111    Artificial Tears Op Soln 1 drop, 1 drop, Both Eyes, Q4H PRN, MAGDA Huynh    carvedilol (COREG) tablet 3.125 mg, 3.125 mg, Oral, BID With Meals, Maryjane Whatley MD, 3.125 mg at 05/05/25 1721    dapsone tablet 50 mg, 50 mg, Oral, Daily, Maryjane Whatley MD, 50 mg at 05/05/25 0839    lactulose (CHRONULAC) oral solution 30 g, 30 g, Oral, 4x daily, Maryjane Whatley MD, 30 g at 05/05/25 2111    lidocaine (LIDODERM) 5 % patch 1 patch, 1 patch, Topical, Daily, Maryjane Whatley MD, 1 patch at 05/05/25 1421    magnesium sulfate 4 g/100 mL IVPB (premix) 4 g, 4 g, Intravenous, Once, Mar Oliva MD    midodrine (PROAMATINE) tablet 10 mg, 10 mg, Oral, TID AC, Maryjane Whatley MD, 10 mg at 05/06/25 0640    oxyCODONE (ROXICODONE) immediate release tablet 10 mg, 10 mg, Oral, Q6H PRN, Maryjane Whatley MD, 10 mg at 05/05/25 1325    pantoprazole  "(PROTONIX) EC tablet 40 mg, 40 mg, Oral, Early Morning, Maryjane Whatley MD, 40 mg at 05/06/25 0640    rifaximin (XIFAXAN) tablet 550 mg, 550 mg, Oral, Q12H MIGUEL, Maryjane Whatley MD, 550 mg at 05/05/25 2326    spironolactone (ALDACTONE) tablet 25 mg, 25 mg, Oral, Daily, Maryjane Whatley MD, 25 mg at 05/05/25 0839    torsemide (DEMADEX) tablet 20 mg, 20 mg, Oral, BID, Maryjane Whatley MD, 20 mg at 05/05/25 1721    ursodiol (ACTIGALL) capsule 300 mg, 300 mg, Oral, BID, Maryjane Whatley MD, 300 mg at 05/05/25 1721    Laboratory Results:  Results from last 7 days   Lab Units 05/06/25  0640 05/05/25  0430 05/04/25  0440 05/03/25  0556 05/02/25  0854   WBC Thousand/uL  --   --  2.45* 2.22* 2.28*   HEMOGLOBIN g/dL  --   --  8.4* 8.3* 8.8*   HEMATOCRIT %  --   --  28.1* 27.5* 28.5*   PLATELETS Thousands/uL  --   --  70* 69* 69*   POTASSIUM mmol/L 3.7 4.4 4.4 4.7 4.4   CHLORIDE mmol/L 100 108 103 104 100   CO2 mmol/L 34* 27 29 27 30   BUN mg/dL 17 16 24 28* 28*   CREATININE mg/dL 1.04 1.03 1.33* 1.96* 1.68*   CALCIUM mg/dL 9.0 9.6 9.1 8.6 8.7   MAGNESIUM mg/dL 1.4*  --   --   --  2.0   PHOSPHORUS mg/dL 3.9  --   --   --   --        Portions of the record may have been created with voice recognition software. Occasional wrong word or \"sound a like\" substitutions may have occurred due to the inherent limitations of voice recognition software. Read the chart carefully and recognize, using context, where substitutions have occurred.If you have any questions, please contact the dictating provider.   "

## 2025-05-07 NOTE — UTILIZATION REVIEW
NOTIFICATION OF ADMISSION DISCHARGE   This is a Notification of Discharge from [facility]. Please be advised that this patient has been discharge from our facility. Below you will find the admission and discharge date and time including the patient’s disposition.   UTILIZATION REVIEW CONTACT:  Utilization Review Assistants  Network Utilization Review Department  Phone: [phone] x carefully listen to the prompts. All voicemails are confidential.  Email: [email or web address]     ADMISSION INFORMATION  PRESENTATION DATE: 5/2/2025  8:20 AM  OBERVATION ADMISSION DATE: 05/02/2025 1156  INPATIENT ADMISSION DATE: 5/3/25  7:58 AM   DISCHARGE DATE: 5/6/2025  2:51 PM   DISPOSITION:Home with Home Health Care    Network Utilization Review Department  ATTENTION: Please call with any questions or concerns to [phone] and carefully listen to the prompts so that you are directed to the right person. All voicemails are confidential.   For Discharge needs, contact Care Management DC Support Team at [phone] opt. 2  Send all requests for admission clinical reviews, approved or denied determinations and any other requests to dedicated fax number below belonging to the campus where the patient is receiving treatment. List of dedicated fax numbers for the Facilities:  FACILITY NAME UR FAX NUMBER   ADMISSION DENIALS (Administrative/Medical Necessity) [phone]   DISCHARGE SUPPORT TEAM ([facility]) [phone]   PARENT CHILD HEALTH (Maternity/NICU/Pediatrics) [phone]   [facility] [phone]   [facility] [phone]   [facility] [phone]   [facility] [phone]   [facility] [phone]   [facility] [phone]   [facility] [phone]   [facility]  [phone]   [facility] [phone]   [facility] [phone]   [facility] [phone]   [facility] [phone]

## 2025-05-25 ENCOUNTER — HOSPITAL ENCOUNTER (INPATIENT)
Facility: HOSPITAL | Age: 55
LOS: 3 days | Discharge: NURSING, REHAB OR LONG-TERM CARE | DRG: 433 | End: 2025-05-28
Attending: STUDENT IN AN ORGANIZED HEALTH CARE EDUCATION/TRAINING PROGRAM | Admitting: FAMILY MEDICINE
Payer: MEDICARE

## 2025-05-25 ENCOUNTER — APPOINTMENT (EMERGENCY)
Dept: CT IMAGING | Facility: HOSPITAL | Age: 55
End: 2025-05-25
Payer: COMMERCIAL

## 2025-05-25 ENCOUNTER — HOSPITAL ENCOUNTER (EMERGENCY)
Facility: HOSPITAL | Age: 55
Discharge: HOME | End: 2025-05-25
Attending: EMERGENCY MEDICINE | Admitting: EMERGENCY MEDICINE
Payer: COMMERCIAL

## 2025-05-25 VITALS
RESPIRATION RATE: 19 BRPM | BODY MASS INDEX: 44.39 KG/M2 | SYSTOLIC BLOOD PRESSURE: 130 MMHG | TEMPERATURE: 98.4 F | DIASTOLIC BLOOD PRESSURE: 63 MMHG | OXYGEN SATURATION: 98 % | WEIGHT: 266.76 LBS | HEART RATE: 98 BPM

## 2025-05-25 DIAGNOSIS — K72.90 DECOMPENSATED CIRRHOSIS (HCC): ICD-10-CM

## 2025-05-25 DIAGNOSIS — K76.82 HEPATIC ENCEPHALOPATHY (HCC): ICD-10-CM

## 2025-05-25 DIAGNOSIS — N17.9 AKI (ACUTE KIDNEY INJURY) (HCC): ICD-10-CM

## 2025-05-25 DIAGNOSIS — M54.9 BACK PAIN: Primary | ICD-10-CM
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
## 2025-05-25 DIAGNOSIS — R10.9 ABDOMINAL PAIN: ICD-10-CM

## 2025-05-25 DIAGNOSIS — N18.9 CKD (CHRONIC KIDNEY DISEASE): ICD-10-CM

## 2025-05-25 DIAGNOSIS — R79.89 INCREASED AMMONIA LEVEL: ICD-10-CM

## 2025-05-25 DIAGNOSIS — K74.60 DECOMPENSATED CIRRHOSIS (HCC): ICD-10-CM

## 2025-05-25 DIAGNOSIS — S39.012A BACK STRAIN, INITIAL ENCOUNTER: ICD-10-CM

## 2025-05-25 DIAGNOSIS — S09.90XA INJURY OF HEAD, INITIAL ENCOUNTER: Primary | ICD-10-CM

## 2025-05-25 DIAGNOSIS — D69.6 THROMBOCYTOPENIA (HCC): ICD-10-CM

## 2025-05-25 DIAGNOSIS — D64.9 ANEMIA: ICD-10-CM

## 2025-05-25 DIAGNOSIS — D3A.098 CARCINOID TUMOR OF PANCREAS: ICD-10-CM

## 2025-05-25 LAB
ALBUMIN SERPL BCG-MCNC: 3.5 G/DL (ref 3.5–5)
ALBUMIN SERPL BCG-MCNC: 3.6 G/DL (ref 3.5–5)
ALP SERPL-CCNC: 181 U/L (ref 34–104)
ALP SERPL-CCNC: 205 U/L (ref 34–104)
ALT SERPL W P-5'-P-CCNC: 20 U/L (ref 7–52)
ALT SERPL W P-5'-P-CCNC: 21 U/L (ref 7–52)
AMMONIA PLAS-SCNC: 168 UMOL/L (ref 18–72)
ANION GAP SERPL CALCULATED.3IONS-SCNC: 7 MMOL/L (ref 4–13)
ANION GAP SERPL CALCULATED.3IONS-SCNC: 8 MMOL/L (ref 4–13)
ANISOCYTOSIS BLD QL SMEAR: PRESENT
AST SERPL W P-5'-P-CCNC: 45 U/L (ref 13–39)
AST SERPL W P-5'-P-CCNC: 54 U/L (ref 13–39)
BASOPHILS # BLD AUTO: 0.03 THOUSANDS/ÂΜL (ref 0–0.1)
BASOPHILS # BLD MANUAL: 0.07 THOUSAND/UL (ref 0–0.1)
BASOPHILS NFR BLD AUTO: 1 % (ref 0–1)
BASOPHILS NFR MAR MANUAL: 2 % (ref 0–1)
BILIRUB SERPL-MCNC: 1.18 MG/DL (ref 0.2–1)
BILIRUB SERPL-MCNC: 1.31 MG/DL (ref 0.2–1)
BUN SERPL-MCNC: 21 MG/DL (ref 5–25)
BUN SERPL-MCNC: 25 MG/DL (ref 5–25)
CALCIUM SERPL-MCNC: 8.6 MG/DL (ref 8.4–10.2)
CALCIUM SERPL-MCNC: 8.8 MG/DL (ref 8.4–10.2)
CHLORIDE SERPL-SCNC: 101 MMOL/L (ref 96–108)
CHLORIDE SERPL-SCNC: 101 MMOL/L (ref 96–108)
CO2 SERPL-SCNC: 24 MMOL/L (ref 21–32)
CO2 SERPL-SCNC: 28 MMOL/L (ref 21–32)
CREAT SERPL-MCNC: 1.71 MG/DL (ref 0.6–1.3)
CREAT SERPL-MCNC: 2.05 MG/DL (ref 0.6–1.3)
EOSINOPHIL # BLD AUTO: 0.09 THOUSAND/ÂΜL (ref 0–0.61)
EOSINOPHIL # BLD MANUAL: 0.13 THOUSAND/UL (ref 0–0.4)
EOSINOPHIL NFR BLD AUTO: 3 % (ref 0–6)
EOSINOPHIL NFR BLD MANUAL: 4 % (ref 0–6)
ERYTHROCYTE [DISTWIDTH] IN BLOOD BY AUTOMATED COUNT: 17.2 % (ref 11.6–15.1)
ERYTHROCYTE [DISTWIDTH] IN BLOOD BY AUTOMATED COUNT: 17.4 % (ref 11.6–15.1)
GFR SERPL CREATININE-BSD FRML MDRD: 26 ML/MIN/1.73SQ M
GFR SERPL CREATININE-BSD FRML MDRD: 33 ML/MIN/1.73SQ M
GLUCOSE SERPL-MCNC: 114 MG/DL (ref 65–140)
GLUCOSE SERPL-MCNC: 193 MG/DL (ref 65–140)
HCT VFR BLD AUTO: 27 % (ref 34.8–46.1)
HCT VFR BLD AUTO: 29.1 % (ref 34.8–46.1)
HGB BLD-MCNC: 8.4 G/DL (ref 11.5–15.4)
HGB BLD-MCNC: 8.9 G/DL (ref 11.5–15.4)
HYPERCHROMIA BLD QL SMEAR: PRESENT
IMM GRANULOCYTES # BLD AUTO: 0.01 THOUSAND/UL (ref 0–0.2)
IMM GRANULOCYTES NFR BLD AUTO: 0 % (ref 0–2)
LACTATE SERPL-SCNC: 1.1 MMOL/L (ref 0.5–2)
LIPASE SERPL-CCNC: 22 U/L (ref 11–82)
LYMPHOCYTES # BLD AUTO: 0.27 THOUSAND/UL (ref 0.6–4.47)
LYMPHOCYTES # BLD AUTO: 0.48 THOUSANDS/ÂΜL (ref 0.6–4.47)
LYMPHOCYTES # BLD AUTO: 8 % (ref 14–44)
LYMPHOCYTES NFR BLD AUTO: 18 % (ref 14–44)
MCH RBC QN AUTO: 28.9 PG (ref 26.8–34.3)
MCH RBC QN AUTO: 29 PG (ref 26.8–34.3)
MCHC RBC AUTO-ENTMCNC: 30.6 G/DL (ref 31.4–37.4)
MCHC RBC AUTO-ENTMCNC: 31.1 G/DL (ref 31.4–37.4)
MCV RBC AUTO: 93 FL (ref 82–98)
MCV RBC AUTO: 95 FL (ref 82–98)
MONOCYTES # BLD AUTO: 0.2 THOUSAND/UL (ref 0–1.22)
MONOCYTES # BLD AUTO: 0.34 THOUSAND/ÂΜL (ref 0.17–1.22)
MONOCYTES NFR BLD AUTO: 13 % (ref 4–12)
MONOCYTES NFR BLD: 6 % (ref 4–12)
MYELOCYTE ABSOLUTE CT: 0.03 THOUSAND/UL (ref 0–0.1)
MYELOCYTES NFR BLD MANUAL: 1 % (ref 0–1)
NEUTROPHILS # BLD AUTO: 1.73 THOUSANDS/ÂΜL (ref 1.85–7.62)
NEUTROPHILS # BLD MANUAL: 2.63 THOUSAND/UL (ref 1.85–7.62)
NEUTS SEG NFR BLD AUTO: 65 % (ref 43–75)
NEUTS SEG NFR BLD AUTO: 79 % (ref 43–75)
NRBC BLD AUTO-RTO: 0 /100 WBCS
OVALOCYTES BLD QL SMEAR: PRESENT
PLATELET # BLD AUTO: 52 THOUSANDS/UL (ref 149–390)
PLATELET # BLD AUTO: 60 THOUSANDS/UL (ref 149–390)
PLATELET BLD QL SMEAR: ABNORMAL
PLATELET CLUMP BLD QL SMEAR: PRESENT
PMV BLD AUTO: 9.3 FL (ref 8.9–12.7)
PMV BLD AUTO: 9.7 FL (ref 8.9–12.7)
POLYCHROMASIA BLD QL SMEAR: PRESENT
POTASSIUM SERPL-SCNC: 4.7 MMOL/L (ref 3.5–5.3)
POTASSIUM SERPL-SCNC: 4.8 MMOL/L (ref 3.5–5.3)
PROT SERPL-MCNC: 7.3 G/DL (ref 6.4–8.4)
PROT SERPL-MCNC: 7.8 G/DL (ref 6.4–8.4)
RBC # BLD AUTO: 2.9 MILLION/UL (ref 3.81–5.12)
RBC # BLD AUTO: 3.08 MILLION/UL (ref 3.81–5.12)
RBC MORPH BLD: PRESENT
SODIUM SERPL-SCNC: 133 MMOL/L (ref 135–147)
SODIUM SERPL-SCNC: 136 MMOL/L (ref 135–147)
WBC # BLD AUTO: 2.68 THOUSAND/UL (ref 4.31–10.16)
WBC # BLD AUTO: 3.33 THOUSAND/UL (ref 4.31–10.16)

## 2025-05-25 PROCEDURE — 85007 BL SMEAR W/DIFF WBC COUNT: CPT | Performed by: EMERGENCY MEDICINE

## 2025-05-25 PROCEDURE — 85025 COMPLETE CBC W/AUTO DIFF WBC: CPT | Performed by: PHYSICIAN ASSISTANT

## 2025-05-25 PROCEDURE — 96366 THER/PROPH/DIAG IV INF ADDON: CPT

## 2025-05-25 PROCEDURE — 99223 1ST HOSP IP/OBS HIGH 75: CPT | Performed by: NURSE PRACTITIONER

## 2025-05-25 PROCEDURE — 96374 THER/PROPH/DIAG INJ IV PUSH: CPT

## 2025-05-25 PROCEDURE — 80053 COMPREHEN METABOLIC PANEL: CPT | Performed by: PHYSICIAN ASSISTANT

## 2025-05-25 PROCEDURE — 70450 CT HEAD/BRAIN W/O DYE: CPT

## 2025-05-25 PROCEDURE — 99285 EMERGENCY DEPT VISIT HI MDM: CPT | Performed by: PHYSICIAN ASSISTANT

## 2025-05-25 PROCEDURE — 83690 ASSAY OF LIPASE: CPT | Performed by: EMERGENCY MEDICINE

## 2025-05-25 PROCEDURE — 96365 THER/PROPH/DIAG IV INF INIT: CPT

## 2025-05-25 PROCEDURE — 71250 CT THORAX DX C-: CPT

## 2025-05-25 PROCEDURE — 72125 CT NECK SPINE W/O DYE: CPT

## 2025-05-25 PROCEDURE — 80053 COMPREHEN METABOLIC PANEL: CPT | Performed by: EMERGENCY MEDICINE

## 2025-05-25 PROCEDURE — 99284 EMERGENCY DEPT VISIT MOD MDM: CPT

## 2025-05-25 PROCEDURE — 96375 TX/PRO/DX INJ NEW DRUG ADDON: CPT

## 2025-05-25 PROCEDURE — 83605 ASSAY OF LACTIC ACID: CPT | Performed by: PHYSICIAN ASSISTANT

## 2025-05-25 PROCEDURE — 82140 ASSAY OF AMMONIA: CPT | Performed by: PHYSICIAN ASSISTANT

## 2025-05-25 PROCEDURE — 85027 COMPLETE CBC AUTOMATED: CPT | Performed by: EMERGENCY MEDICINE

## 2025-05-25 PROCEDURE — 96376 TX/PRO/DX INJ SAME DRUG ADON: CPT

## 2025-05-25 PROCEDURE — 36415 COLL VENOUS BLD VENIPUNCTURE: CPT | Performed by: EMERGENCY MEDICINE

## 2025-05-25 PROCEDURE — 74176 CT ABD & PELVIS W/O CONTRAST: CPT

## 2025-05-25 RX ORDER — PANTOPRAZOLE SODIUM 40 MG/1
40 TABLET, DELAYED RELEASE ORAL
Status: DISCONTINUED | OUTPATIENT
Start: 2025-05-26 | End: 2025-05-28 | Stop reason: HOSPADM

## 2025-05-25 RX ORDER — FOLIC ACID 1 MG/1
1 TABLET ORAL DAILY
Status: DISCONTINUED | OUTPATIENT
Start: 2025-05-26 | End: 2025-05-28 | Stop reason: HOSPADM

## 2025-05-25 RX ORDER — OXYCODONE AND ACETAMINOPHEN 5; 325 MG/1; MG/1
1 TABLET ORAL ONCE
Refills: 0 | Status: COMPLETED | OUTPATIENT
Start: 2025-05-25 | End: 2025-05-25

## 2025-05-25 RX ORDER — FENTANYL CITRATE 50 UG/ML
50 INJECTION, SOLUTION INTRAMUSCULAR; INTRAVENOUS ONCE
Refills: 0 | Status: COMPLETED | OUTPATIENT
Start: 2025-05-25 | End: 2025-05-25

## 2025-05-25 RX ORDER — LACTULOSE 10 G/15ML
30 SOLUTION ORAL 4 TIMES DAILY
Status: DISCONTINUED | OUTPATIENT
Start: 2025-05-25 | End: 2025-05-28 | Stop reason: HOSPADM

## 2025-05-25 RX ORDER — LACTULOSE 10 G/15ML
30 SOLUTION ORAL 4 TIMES DAILY
Status: DISCONTINUED | OUTPATIENT
Start: 2025-05-25 | End: 2025-05-25

## 2025-05-25 RX ORDER — ONDANSETRON 2 MG/ML
4 INJECTION INTRAMUSCULAR; INTRAVENOUS EVERY 6 HOURS PRN
Status: DISCONTINUED | OUTPATIENT
Start: 2025-05-25 | End: 2025-05-28 | Stop reason: HOSPADM

## 2025-05-25 RX ORDER — CYCLOBENZAPRINE HCL 10 MG
10 TABLET ORAL 3 TIMES DAILY PRN
Status: DISCONTINUED | OUTPATIENT
Start: 2025-05-25 | End: 2025-05-28 | Stop reason: HOSPADM

## 2025-05-25 RX ORDER — LACTULOSE 10 G/15ML
20 SOLUTION ORAL ONCE
Status: DISCONTINUED | OUTPATIENT
Start: 2025-05-25 | End: 2025-05-25

## 2025-05-25 RX ORDER — OXYCODONE HYDROCHLORIDE 5 MG/1
5 TABLET ORAL EVERY 6 HOURS PRN
Refills: 0 | Status: DISCONTINUED | OUTPATIENT
Start: 2025-05-25 | End: 2025-05-28 | Stop reason: HOSPADM

## 2025-05-25 RX ADMIN — LACTULOSE 30 G: 20 SOLUTION ORAL at 20:03

## 2025-05-25 RX ADMIN — FENTANYL CITRATE 50 MCG: 0.05 INJECTION, SOLUTION INTRAMUSCULAR; INTRAVENOUS at 05:51

## 2025-05-25 RX ADMIN — SODIUM CHLORIDE, SODIUM LACTATE, POTASSIUM CHLORIDE, AND CALCIUM CHLORIDE 1000 ML: .6; .31; .03; .02 INJECTION, SOLUTION INTRAVENOUS at 04:39

## 2025-05-25 RX ADMIN — OXYCODONE HYDROCHLORIDE AND ACETAMINOPHEN 1 TABLET: 5; 325 TABLET ORAL at 06:43

## 2025-05-25 RX ADMIN — RIFAXIMIN 550 MG: 550 TABLET ORAL at 22:42

## 2025-05-25 RX ADMIN — FENTANYL CITRATE 50 MCG: 50 INJECTION INTRAMUSCULAR; INTRAVENOUS at 19:08

## 2025-05-25 RX ADMIN — SODIUM CHLORIDE 1000 ML: 0.9 INJECTION, SOLUTION INTRAVENOUS at 19:56

## 2025-05-25 RX ADMIN — AMITRIPTYLINE HYDROCHLORIDE 50 MG: 25 TABLET, FILM COATED ORAL at 22:42

## 2025-05-25 RX ADMIN — FENTANYL CITRATE 50 MCG: 0.05 INJECTION, SOLUTION INTRAMUSCULAR; INTRAVENOUS at 04:37

## 2025-05-25 RX ADMIN — OXYCODONE HYDROCHLORIDE 5 MG: 5 TABLET ORAL at 22:42

## 2025-05-25 NOTE — Clinical Note
Bailey Olsen was seen and treated in our emergency department on 5/25/2025.                Diagnosis:     Bailey  may return to work on return date.    She may return on this date: 05/26/2025         If you have any questions or concerns, please don't hesitate to call.      Alli Monroy MD    ______________________________           _______________          _______________  Hospital Representative                              Date                                Time

## 2025-05-25 NOTE — ED PROVIDER NOTES
Time reflects when diagnosis was documented in both MDM as applicable and the Disposition within this note       Time User Action Codes Description Comment    5/25/2025  5:44 AM Alli Monroy Add [S09.90XA] Injury of head, initial encounter     5/25/2025  5:44 AM Alli Monroy Add [W19.XXXA] Fall, initial encounter     5/25/2025  5:44 AM Alli Monroy Add [S39.012A] Back strain, initial encounter     5/25/2025  5:44 AM Alli Monroy Add [R10.9] Abdominal pain     5/25/2025  5:44 AM Alli Monroy Add [D64.9] Anemia     5/25/2025  5:44 AM Alli Monroy Add [D69.6] Thrombocytopenia (HCC)     5/25/2025  5:47 AM Alli Monroy Add [N18.9] CKD (chronic kidney disease)           ED Disposition       ED Disposition   Discharge    Condition   Stable    Date/Time   Sun May 25, 2025  6:24 AM    Comment   Bailey Olsen discharge to home/self care.                   Assessment & Plan       Medical Decision Making  0430: Patient appears anxious, vital signs reviewed.  Plan to complete CT head, CT cervical spine, CT chest and pelvis with T/L recons.  I will give analgesics for discomfort and reevaluate.  Placed on a monitor.    0630: CT and labs reviewed.  Pain well-controlled.  Stable for discharge.    Amount and/or Complexity of Data Reviewed  Labs: ordered.  Radiology: ordered.     Details: CT head--no intracranial hemorrhage  CT cervical spine--no fracture  CT chest abdomen pelvis--cirrhosis, healing rib fractures  CT T/L recons--no fracture    Risk  Prescription drug management.             Medications   lactated ringers bolus 1,000 mL (0 mL Intravenous Stopped 5/25/25 0640)   fentaNYL injection 50 mcg (50 mcg Intravenous Given 5/25/25 0437)   fentaNYL injection 50 mcg (50 mcg Intravenous Given 5/25/25 0551)   oxyCODONE-acetaminophen (PERCOCET) 5-325 mg per tablet 1 tablet (1 tablet Oral Given 5/25/25 0643)       ED Risk Strat Scores                    No data recorded        SBIRT 20yo+      Flowsheet Row Most  Recent Value   Initial Alcohol Screen: US AUDIT-C     1. How often do you have a drink containing alcohol? 0 Filed at: 05/25/2025 0426   2. How many drinks containing alcohol do you have on a typical day you are drinking?  0 Filed at: 05/25/2025 0426   3b. FEMALE Any Age, or MALE 65+: How often do you have 4 or more drinks on one occassion? 0 Filed at: 05/25/2025 0426   Audit-C Score 0 Filed at: 05/25/2025 0426   BERNIE: How many times in the past year have you...    Used an illegal drug or used a prescription medication for non-medical reasons? Never Filed at: 05/25/2025 0426                            History of Present Illness       Chief Complaint   Patient presents with    Fall     Pt reports falling in the bathroom approximately an hour ago. States she hit the back of her head on the sink. Denies BT or LOC. C/o back pain and head pain.       Past Medical History[1]   Past Surgical History[2]   Family History[3]   Social History[4]   E-Cigarette/Vaping    E-Cigarette Use Never User       E-Cigarette/Vaping Substances    Nicotine No     THC No     CBD No     Flavoring No     Other No     Unknown No       I have reviewed and agree with the history as documented.       History provided by:  Medical records, patient and spouse  Fall  Mechanism of injury: fall    Injury location:  Head/neck and torso  Head/neck injury location:  Head, L neck and R neck  Torso injury location:  Back  Incident location:  Home  Time since incident:  1 hour  Arrived directly from scene: yes    Fall:     Fall occurred: The patient got up to go to the bathroom, she decided to weigh herself on the scale, she lost her footing and fell backwards onto her back striking the back of her head.    Height of fall:  GLF    Impact surface:  Hard floor    Point of impact:  Back and head    Entrapped after fall: no    Protective equipment: none    Suspicion of alcohol use: no    Suspicion of drug use: no    Prior to arrival data:     Bystander  interventions:  None    Patient ambulatory at scene: yes      Blood loss:  None    Responsiveness at scene:  Alert    Orientation at scene:  Person, place, situation and time    Loss of consciousness: no      Amnesic to event: no      Airway interventions:  None    Airway condition since incident:  Stable    Breathing condition since incident:  Stable    Circulation condition since incident:  Stable    Mental status condition since incident:  Stable    Disability condition since incident:  Stable  Associated symptoms: abdominal pain, back pain, chest pain, headaches and neck pain    Associated symptoms: no blindness, no difficulty breathing, no hearing loss, no loss of consciousness, no nausea, no seizures and no vomiting        Review of Systems   Constitutional:  Negative for chills, fatigue and fever.   HENT:  Negative for ear discharge, ear pain, hearing loss, rhinorrhea and sore throat.    Eyes:  Negative for blindness, pain and visual disturbance.   Respiratory:  Negative for cough and shortness of breath.    Cardiovascular:  Positive for chest pain. Negative for palpitations.   Gastrointestinal:  Positive for abdominal pain. Negative for diarrhea, nausea and vomiting.   Endocrine: Negative for polydipsia, polyphagia and polyuria.   Genitourinary:  Negative for difficulty urinating, dysuria, flank pain and hematuria.   Musculoskeletal:  Positive for back pain and neck pain. Negative for arthralgias.   Skin:  Negative for color change and rash.   Allergic/Immunologic: Negative for immunocompromised state.   Neurological:  Positive for headaches. Negative for dizziness, seizures, loss of consciousness, syncope and weakness.   Psychiatric/Behavioral:  Negative for confusion and self-injury. The patient is not nervous/anxious.    All other systems reviewed and are negative.          Objective       ED Triage Vitals [05/25/25 0426]   Temperature Pulse Blood Pressure Respirations SpO2 Patient Position - Orthostatic  VS   98.4 °F (36.9 °C) 101 107/57 20 98 % Lying      Temp Source Heart Rate Source BP Location FiO2 (%) Pain Score    Temporal Monitor Right arm -- 10 - Worst Possible Pain      Vitals      Date and Time Temp Pulse SpO2 Resp BP Pain Score FACES Pain Rating User   05/25/25 0643 -- -- -- -- -- 9 --    05/25/25 0621 -- -- -- -- -- 9 --    05/25/25 0551 -- -- -- -- -- 9 --    05/25/25 0519 -- -- -- -- -- 10 - Worst Possible Pain --    05/25/25 0515 -- 98 98 % 19 130/63 -- --    05/25/25 0456 -- -- -- -- -- 10 - Worst Possible Pain --    05/25/25 0437 -- -- -- -- -- 10 - Worst Possible Pain --    05/25/25 0426 98.4 °F (36.9 °C) 101 98 % 20 107/57 10 - Worst Possible Pain -- AR            Physical Exam  Vitals and nursing note reviewed.   Constitutional:       General: She is not in acute distress.     Appearance: Normal appearance. She is obese. She is not ill-appearing, toxic-appearing or diaphoretic.   HENT:      Head: Normocephalic.      Comments: Right occipital cephalhematoma, small     Right Ear: External ear normal.      Left Ear: External ear normal.      Nose: Nose normal. No congestion or rhinorrhea.      Mouth/Throat:      Mouth: Mucous membranes are moist.      Pharynx: Oropharynx is clear. No oropharyngeal exudate or posterior oropharyngeal erythema.     Eyes:      General:         Right eye: No discharge.         Left eye: No discharge.       Cardiovascular:      Rate and Rhythm: Normal rate and regular rhythm.      Pulses: Normal pulses.      Heart sounds: Normal heart sounds. No murmur heard.     No gallop.   Pulmonary:      Effort: Pulmonary effort is normal. No respiratory distress.      Breath sounds: Normal breath sounds. No stridor. No wheezing, rhonchi or rales.   Chest:      Chest wall: Tenderness present.   Abdominal:      General: Bowel sounds are normal. There is no distension.      Palpations: Abdomen is soft. There is no mass.      Tenderness: There is abdominal tenderness. There  is no right CVA tenderness, left CVA tenderness, guarding or rebound.      Hernia: No hernia is present.      Comments: Generalized abdominal tenderness, out of proportion to exam     Musculoskeletal:         General: Normal range of motion.      Cervical back: Normal range of motion and neck supple.     Skin:     General: Skin is warm and dry.      Capillary Refill: Capillary refill takes less than 2 seconds.     Neurological:      General: No focal deficit present.      Mental Status: She is alert and oriented to person, place, and time.      Cranial Nerves: No cranial nerve deficit.      Sensory: No sensory deficit.      Motor: No weakness.      Coordination: Coordination normal.      Gait: Gait normal.      Deep Tendon Reflexes: Reflexes normal.     Psychiatric:         Behavior: Behavior normal.         Thought Content: Thought content normal.         Judgment: Judgment normal.      Comments: Anxious appearing         Results Reviewed       Procedure Component Value Units Date/Time    Manual Differential(PHLEBS Do Not Order) [[id number]]  (Abnormal) Collected: 05/25/25 0515    Lab Status: Final result Specimen: Blood from Arm, Right Updated: 05/25/25 0640     Segmented % 79 %      Lymphocytes % 8 %      Monocytes % 6 %      Eosinophils % 4 %      Basophils % 2 %      Myelocytes % 1 %      Absolute Neutrophils 2.63 Thousand/uL      Absolute Lymphocytes 0.27 Thousand/uL      Absolute Monocytes 0.20 Thousand/uL      Absolute Eosinophils 0.13 Thousand/uL      Absolute Basophils 0.07 Thousand/uL      Absolute Myelocytes 0.03 Thousand/uL      Total Counted --     RBC Morphology Present     Platelet Estimate Decreased     Clumped Platelets Present     Anisocytosis Present     Hypochromia Present     Ovalocytes Present     Polychromasia Present    UA w Reflex to Microscopic w Reflex to Culture [[id number]] Collected: 05/25/25 0621    Lab Status: No result Specimen: Urine, Clean Catch     CBC and differential [[id number]]   (Abnormal) Collected: 05/25/25 0515    Lab Status: Final result Specimen: Blood from Arm, Right Updated: 05/25/25 0534     WBC 3.33 Thousand/uL      RBC 3.08 Million/uL      Hemoglobin 8.9 g/dL      Hematocrit 29.1 %      MCV 95 fL      MCH 28.9 pg      MCHC 30.6 g/dL      RDW 17.2 %      MPV 9.7 fL      Platelets 60 Thousands/uL     Comprehensive metabolic panel [[id number]]  (Abnormal) Collected: 05/25/25 0437    Lab Status: Final result Specimen: Blood from Arm, Right Updated: 05/25/25 0524     Sodium 133 mmol/L      Potassium 4.8 mmol/L      Chloride 101 mmol/L      CO2 24 mmol/L      ANION GAP 8 mmol/L      BUN 21 mg/dL      Creatinine 1.71 mg/dL      Glucose 193 mg/dL      Calcium 8.8 mg/dL      AST 54 U/L      ALT 21 U/L      Alkaline Phosphatase 205 U/L      Total Protein 7.8 g/dL      Albumin 3.6 g/dL      Total Bilirubin 1.18 mg/dL      eGFR 33 ml/min/1.73sq m     Narrative:      [employer] guidelines for Chronic Kidney Disease (CKD):     Stage 1 with normal or high GFR (GFR > 90 mL/min/1.73 square meters)    Stage 2 Mild CKD (GFR = 60-89 mL/min/1.73 square meters)    Stage 3A Moderate CKD (GFR = 45-59 mL/min/1.73 square meters)    Stage 3B Moderate CKD (GFR = 30-44 mL/min/1.73 square meters)    Stage 4 Severe CKD (GFR = 15-29 mL/min/1.73 square meters)    Stage 5 End Stage CKD (GFR <15 mL/min/1.73 square meters)  Note: GFR calculation is accurate only with a steady state creatinine    Lipase [[id number]]  (Normal) Collected: 05/25/25 0437    Lab Status: Final result Specimen: Blood from Arm, Right Updated: 05/25/25 0524     Lipase 22 u/L             CT head without contrast   Final Interpretation by Jonathon Westfall MD (05/25 0606)         1. Small right occipital scalp hematoma without evidence for fracture.   2. No acute intracranial abnormality identified.      The study was marked in EPIC for immediate notification.                  Workstation performed: [id number]          CT spine cervical without contrast   Final Interpretation by Jonathon Westfall MD (05/25 0609)      No cervical spine fracture or traumatic malalignment.         The study was marked in EPIC for immediate notification.         Workstation performed: [id number]         CT chest abdomen pelvis wo contrast   Final Interpretation by Jonathon Westfall MD (05/25 0621)         1. No acute abnormality identified in the chest, abdomen, or pelvis.   2. Healing right fourth and 10th rib fractures.   3. Cirrhosis with small amount of perihepatic ascites and splenomegaly consistent with portal hypertension.   4. Stable mild nonspecific groundglass opacity left upper lobe likely postinflammatory.   5. Additional findings as noted.      The study was marked in EPIC for immediate notification.            Computerized Assisted Algorithm (CAA) may have aided analysis of applicable images.         Workstation performed: [id number]         CT recon only thoracolumbar (no charge)   Final Interpretation by Jonathon Westfall MD (05/25 0631)         1. No acute fracture or traumatic subluxation in the thoracolumbar spine.   2. Stable remote anterior wedge compression fracture L1 vertebral body status post percutaneous vertebral augmentation.                     Workstation performed: [id number]             Procedures    ED Medication and Procedure Management   Prior to Admission Medications   Prescriptions Last Dose Informant Patient Reported? Taking?   amitriptyline (ELAVIL) 50 mg tablet   No No   Sig: Take 1 tablet (50 mg total) by mouth daily at bedtime   cyclobenzaprine (FLEXERIL) 10 mg tablet  Pharmacy (Specify) Yes No   Sig: Take 10 mg by mouth 3 (three) times a day as needed for muscle spasms   dapsone 25 mg tablet   Yes No   Sig: Take 50 mg by mouth daily   folic acid (FOLVITE) 1 mg tablet   Yes No   Sig: Take 1 mg by mouth daily   lactulose (CHRONULAC) 10 g/15 mL solution   No No   Sig: Take 45 mL (30 g total) by mouth  4 (four) times a day   lidocaine (LIDODERM) 5 %  Pharmacy (Specify) No No   Sig: Apply 1 patch topically over 12 hours daily Remove & Discard patch within 12 hours or as directed by MD   metoprolol tartrate (LOPRESSOR) 25 mg tablet   No No   Sig: Take 0.5 tablets (12.5 mg total) by mouth every 12 (twelve) hours   midodrine (PROAMATINE) 2.5 mg tablet   No No   Sig: Take 4 tablets (10 mg total) by mouth 3 (three) times a day before meals   pantoprazole (PROTONIX) 40 mg tablet   No No   Sig: Take 1 tablet (40 mg total) by mouth daily in the early morning   polyethylene glycol (MIRALAX) 17 g packet  Pharmacy (Specify) Yes No   Sig: Take 17 g by mouth daily as needed   potassium chloride (Klor-Con M20) 20 mEq tablet   No No   Sig: Take 2 tablets (40 mEq total) by mouth daily   rifaximin (XIFAXAN) 550 mg tablet   Yes No   Sig: Take 550 mg by mouth every 12 (twelve) hours   spironolactone (ALDACTONE) 25 mg tablet   No No   Sig: Take 1 tablet (25 mg total) by mouth daily   torsemide (DEMADEX) 20 mg tablet   No No   Sig: Take 1 tablet (20 mg total) by mouth 2 (two) times a day   ursodiol (ACTIGALL) 300 mg capsule   Yes No   Sig: Take 300 mg by mouth 2 (two) times a day      Facility-Administered Medications: None     Patient's Medications   Discharge Prescriptions    No medications on file     No discharge procedures on file.  ED SEPSIS DOCUMENTATION   Time reflects when diagnosis was documented in both MDM as applicable and the Disposition within this note       Time User Action Codes Description Comment    5/25/2025  5:44 AM Alli Monroy [S09.90XA] Injury of head, initial encounter     5/25/2025  5:44 AM Alli Monroy [W19.XXXA] Fall, initial encounter     5/25/2025  5:44 AM Alli Monroy [S39.012A] Back strain, initial encounter     5/25/2025  5:44 AM Alli Monroy [R10.9] Abdominal pain     5/25/2025  5:44 AM Alli Monroy [D64.9] Anemia     5/25/2025  5:44 AM Alli Monroy [D69.6]  Thrombocytopenia (HCC)     5/25/2025  5:47 AM Alli Monroy Add [N18.9] CKD (chronic kidney disease)                      [1]   Past Medical History:  Diagnosis Date    Back pain     Carcinoid tumor     neuroendocrine    CHF (congestive heart failure) (HCC)     Cirrhosis of liver (HCC)     Hypertension     Sarcoidosis    [2]   Past Surgical History:  Procedure Laterality Date    CHOLECYSTECTOMY      GASTRIC BYPASS      HERNIA REPAIR      IR KYPHOPLASTY/VERTEBROPLASTY  10/15/2024   [3]   Family History  Problem Relation Name Age of Onset    Hypertension Father      Heart attack Father      No Known Problems Sister      No Known Problems Brother      No Known Problems Brother      No Known Problems Brother      No Known Problems Brother      No Known Problems Brother      No Known Problems Daughter     [4]   Social History  Tobacco Use    Smoking status: Never    Smokeless tobacco: Never   Vaping Use    Vaping status: Never Used   Substance Use Topics    Alcohol use: Never    Drug use: Never        Alli Monroy MD  05/25/25 0647

## 2025-05-26 PROBLEM — S00.03XA SCALP HEMATOMA: Status: ACTIVE | Noted: 2025-05-26

## 2025-05-26 PROBLEM — R58 ECCHYMOSIS: Status: ACTIVE | Noted: 2025-05-26

## 2025-05-26 PROBLEM — S20.229A CONTUSION OF BACK: Status: ACTIVE | Noted: 2025-05-26

## 2025-05-26 LAB
ALBUMIN SERPL BCG-MCNC: 3.2 G/DL (ref 3.5–5)
ALP SERPL-CCNC: 176 U/L (ref 34–104)
ALT SERPL W P-5'-P-CCNC: 16 U/L (ref 7–52)
AMMONIA PLAS-SCNC: 146 UMOL/L (ref 18–72)
ANION GAP SERPL CALCULATED.3IONS-SCNC: 8 MMOL/L (ref 4–13)
AST SERPL W P-5'-P-CCNC: 47 U/L (ref 13–39)
BILIRUB SERPL-MCNC: 1.38 MG/DL (ref 0.2–1)
BUN SERPL-MCNC: 22 MG/DL (ref 5–25)
CALCIUM ALBUM COR SERPL-MCNC: 9 MG/DL (ref 8.3–10.1)
CALCIUM SERPL-MCNC: 8.4 MG/DL (ref 8.4–10.2)
CHLORIDE SERPL-SCNC: 106 MMOL/L (ref 96–108)
CO2 SERPL-SCNC: 23 MMOL/L (ref 21–32)
CREAT SERPL-MCNC: 1.63 MG/DL (ref 0.6–1.3)
CREAT UR-MCNC: 60.7 MG/DL
ERYTHROCYTE [DISTWIDTH] IN BLOOD BY AUTOMATED COUNT: 17.8 % (ref 11.6–15.1)
GFR SERPL CREATININE-BSD FRML MDRD: 35 ML/MIN/1.73SQ M
GLUCOSE SERPL-MCNC: 108 MG/DL (ref 65–140)
HCT VFR BLD AUTO: 25.7 % (ref 34.8–46.1)
HGB BLD-MCNC: 7.9 G/DL (ref 11.5–15.4)
INR PPP: 1.31 (ref 0.85–1.19)
MAGNESIUM SERPL-MCNC: 2.1 MG/DL (ref 1.9–2.7)
MCH RBC QN AUTO: 28.6 PG (ref 26.8–34.3)
MCHC RBC AUTO-ENTMCNC: 30.7 G/DL (ref 31.4–37.4)
MCV RBC AUTO: 93 FL (ref 82–98)
PLATELET # BLD AUTO: 81 THOUSANDS/UL (ref 149–390)
PMV BLD AUTO: 11.5 FL (ref 8.9–12.7)
POTASSIUM SERPL-SCNC: 4.4 MMOL/L (ref 3.5–5.3)
PROT SERPL-MCNC: 7 G/DL (ref 6.4–8.4)
PROTHROMBIN TIME: 16.6 SECONDS (ref 12.3–15)
RBC # BLD AUTO: 2.76 MILLION/UL (ref 3.81–5.12)
SODIUM SERPL-SCNC: 137 MMOL/L (ref 135–147)
SODIUM UR-SCNC: 51 MMOL/L
WBC # BLD AUTO: 1.97 THOUSAND/UL (ref 4.31–10.16)

## 2025-05-26 PROCEDURE — 84300 ASSAY OF URINE SODIUM: CPT | Performed by: INTERNAL MEDICINE

## 2025-05-26 PROCEDURE — 99255 IP/OBS CONSLTJ NEW/EST HI 80: CPT | Performed by: INTERNAL MEDICINE

## 2025-05-26 PROCEDURE — 85027 COMPLETE CBC AUTOMATED: CPT | Performed by: NURSE PRACTITIONER

## 2025-05-26 PROCEDURE — 82140 ASSAY OF AMMONIA: CPT | Performed by: NURSE PRACTITIONER

## 2025-05-26 PROCEDURE — 82570 ASSAY OF URINE CREATININE: CPT | Performed by: INTERNAL MEDICINE

## 2025-05-26 PROCEDURE — 83735 ASSAY OF MAGNESIUM: CPT | Performed by: NURSE PRACTITIONER

## 2025-05-26 PROCEDURE — 80053 COMPREHEN METABOLIC PANEL: CPT | Performed by: NURSE PRACTITIONER

## 2025-05-26 PROCEDURE — 99232 SBSQ HOSP IP/OBS MODERATE 35: CPT | Performed by: FAMILY MEDICINE

## 2025-05-26 PROCEDURE — 85610 PROTHROMBIN TIME: CPT | Performed by: NURSE PRACTITIONER

## 2025-05-26 RX ADMIN — RIFAXIMIN 550 MG: 550 TABLET ORAL at 21:19

## 2025-05-26 RX ADMIN — CYCLOBENZAPRINE 10 MG: 10 TABLET, FILM COATED ORAL at 18:06

## 2025-05-26 RX ADMIN — LACTULOSE 30 G: 20 SOLUTION ORAL at 11:53

## 2025-05-26 RX ADMIN — OXYCODONE HYDROCHLORIDE 5 MG: 5 TABLET ORAL at 21:19

## 2025-05-26 RX ADMIN — CYCLOBENZAPRINE 10 MG: 10 TABLET, FILM COATED ORAL at 09:11

## 2025-05-26 RX ADMIN — AMITRIPTYLINE HYDROCHLORIDE 50 MG: 25 TABLET, FILM COATED ORAL at 21:19

## 2025-05-26 RX ADMIN — LACTULOSE 30 G: 20 SOLUTION ORAL at 18:05

## 2025-05-26 RX ADMIN — RIFAXIMIN 550 MG: 550 TABLET ORAL at 09:09

## 2025-05-26 RX ADMIN — LACTULOSE 30 G: 20 SOLUTION ORAL at 09:09

## 2025-05-26 RX ADMIN — OXYCODONE HYDROCHLORIDE 5 MG: 5 TABLET ORAL at 05:34

## 2025-05-26 RX ADMIN — LACTULOSE 30 G: 20 SOLUTION ORAL at 21:20

## 2025-05-26 RX ADMIN — OXYCODONE HYDROCHLORIDE 5 MG: 5 TABLET ORAL at 11:53

## 2025-05-26 RX ADMIN — PANTOPRAZOLE SODIUM 40 MG: 40 TABLET, DELAYED RELEASE ORAL at 05:34

## 2025-05-26 RX ADMIN — FOLIC ACID 1 MG: 1 TABLET ORAL at 09:09

## 2025-05-26 NOTE — ASSESSMENT & PLAN NOTE
Fall in the bathroom at 3 AM the morning of presentation  Noted to have large ecchymotic area thoracic spine  Hold anticoagulation  Trend H&H

## 2025-05-26 NOTE — ED PROVIDER NOTES
Time reflects when diagnosis was documented in both MDM as applicable and the Disposition within this note       Time User Action Codes Description Comment    5/25/2025  7:48 PM Ashanti Dominique Add [M54.9] Back pain     5/25/2025  7:49 PM Ashanti Dominique Add [R79.89] Increased ammonia level     5/25/2025  7:49 PM Ashanti Dominique Add [N17.9] KISHORE (acute kidney injury) (HCC)           ED Disposition       ED Disposition   Admit    Condition   Stable    Date/Time   Sun May 25, 2025  7:48 PM    Comment   Case was discussed with Suzanne and the patient's admission status was agreed to be Admission Status: inpatient status to the service of Dr. Stuart .               Assessment & Plan       Medical Decision Making  54-year-old female presented to the emergency department for evaluation of pain status post fall and daughter reported increased confusion.  Vitals and medical record reviewed.  Patient at risk for the following but not limited to: Contusion, inadequate pain control, KISHORE, electrolyte abnormality, increased ammonia levels.  Patient was treated in the emergency department for an elevated ammonia level with lactulose.  Also found to have KISHORE.  Reviewed CT imaging from today no acute traumatic injury.  Pain treated with Toradol    Problems Addressed:  KISHORE (acute kidney injury) (HCC): acute illness or injury  Back pain: acute illness or injury  Increased ammonia level: acute illness or injury    Amount and/or Complexity of Data Reviewed  Labs: ordered. Decision-making details documented in ED Course.    Risk  Prescription drug management.  Decision regarding hospitalization.        ED Course as of 05/25/25 2134   [place] May 25, 2025   1932 Hemoglobin(!): 8.4  baseline   1932 Creatinine(!): 2.05  Elevated from prior   1932 Ammonia(!): 168   1932 LACTIC ACID: 1.1       Medications   lactulose (CHRONULAC) oral solution 30 g (30 g Oral Given 5/25/25 2003)   fentaNYL injection 50 mcg (50 mcg Intravenous Given 5/25/25 1908)    sodium chloride 0.9 % bolus 1,000 mL (1,000 mL Intravenous New Bag 5/25/25 1956)       ED Risk Strat Scores                    No data recorded        SBIRT 20yo+      Flowsheet Row Most Recent Value   Initial Alcohol Screen: US AUDIT-C     1. How often do you have a drink containing alcohol? 0 Filed at: 05/25/2025 1821   2. How many drinks containing alcohol do you have on a typical day you are drinking?  0 Filed at: 05/25/2025 1821   3b. FEMALE Any Age, or MALE 65+: How often do you have 4 or more drinks on one occassion? 0 Filed at: 05/25/2025 1821   Audit-C Score 0 Filed at: 05/25/2025 1821   BERNIE: How many times in the past year have you...    Used an illegal drug or used a prescription medication for non-medical reasons? Never Filed at: 05/25/2025 1821                            History of Present Illness       Chief Complaint   Patient presents with    Flank Pain     Patient presents to the ED with complaints of right flank pain and generalized body pain secondary to a fall this morning. The patient was evaluated for the fall this morning, but states increase in pain.        Past Medical History[1]   Past Surgical History[2]   Family History[3]   Social History[4]   E-Cigarette/Vaping    E-Cigarette Use Never User       E-Cigarette/Vaping Substances    Nicotine No     THC No     CBD No     Flavoring No     Other No     Unknown No       I have reviewed and agree with the history as documented.     54-year-old female presents to the emergency department for evaluation of back pain.  Patient was seen early today after a fall.  Patient states she had fell in the bathroom.  Had full CT imaging workup which was negative for acute traumatic injury.  Patient has had not had any additional falls.  States she is unable to tolerate pain at home.  Most of the pains in her back.  States she also hit her head.  Daughter states she feels like patient is more confused than normal.  States she has history of elevated  ammonia levels and feels this is a similar presentation.  Reports patient has not been able having bowel movements like normal.  Patient denies any visual changes.  She denies any unilateral weakness or numbness, patient droop or slurred speech.        Review of Systems   Constitutional: Negative.    Respiratory: Negative.     Cardiovascular: Negative.    Gastrointestinal:  Negative for abdominal pain, diarrhea, nausea and vomiting.   Genitourinary:  Positive for flank pain.   Musculoskeletal:  Positive for back pain.   Skin:  Positive for color change.   Neurological:  Positive for headaches.   Psychiatric/Behavioral:  Positive for confusion.    All other systems reviewed and are negative.          Objective       ED Triage Vitals [05/25/25 1820]   Temperature Pulse Blood Pressure Respirations SpO2 Patient Position - Orthostatic VS   98.4 °F (36.9 °C) 95 132/80 18 96 % Lying      Temp Source Heart Rate Source BP Location FiO2 (%) Pain Score    Temporal Monitor Right arm -- 9      Vitals      Date and Time Temp Pulse SpO2 Resp BP Pain Score FACES Pain Rating User   05/25/25 2024 97.9 °F (36.6 °C) 99 99 % 18 141/66 -- -- DII   05/25/25 2021 -- -- -- -- -- 8 -- SB   05/25/25 1908 -- -- -- -- -- 10 - Worst Possible Pain -- BA   05/25/25 1820 98.4 °F (36.9 °C) 95 96 % 18 132/80 9 -- RR            Physical Exam  Vitals and nursing note reviewed.   Constitutional:       General: She is not in acute distress.     Appearance: Normal appearance. She is ill-appearing. She is not toxic-appearing or diaphoretic.   HENT:      Head: Normocephalic and atraumatic.      Nose: Nose normal.     Eyes:      Extraocular Movements: Extraocular movements intact.      Conjunctiva/sclera: Conjunctivae normal.      Pupils: Pupils are equal, round, and reactive to light.       Cardiovascular:      Rate and Rhythm: Normal rate and regular rhythm.   Pulmonary:      Effort: Pulmonary effort is normal.      Breath sounds: Normal breath sounds. No  stridor. No wheezing, rhonchi or rales.   Chest:      Chest wall: No tenderness.   Abdominal:      General: There is no distension.      Palpations: Abdomen is soft.      Tenderness: There is no abdominal tenderness.     Musculoskeletal:         General: Tenderness present.      Cervical back: Normal.      Comments: Diffuse tenderness through the thoracic and lumbar region of the back.  Noted bruising.  No step-off deformities.     Skin:     Comments: Bruising upper back     Neurological:      Mental Status: She is alert.         Results Reviewed       Procedure Component Value Units Date/Time    Lactic acid, plasma (w/reflex if result > 2.0) [[id number]]  (Normal) Collected: 05/25/25 1900    Lab Status: Final result Specimen: Blood from Arm, Left Updated: 05/25/25 1923     LACTIC ACID 1.1 mmol/L     Narrative:      Result may be elevated if tourniquet was used during collection.    Comprehensive metabolic panel [[id number]]  (Abnormal) Collected: 05/25/25 1900    Lab Status: Final result Specimen: Blood from Arm, Left Updated: 05/25/25 1923     Sodium 136 mmol/L      Potassium 4.7 mmol/L      Chloride 101 mmol/L      CO2 28 mmol/L      ANION GAP 7 mmol/L      BUN 25 mg/dL      Creatinine 2.05 mg/dL      Glucose 114 mg/dL      Calcium 8.6 mg/dL      AST 45 U/L      ALT 20 U/L      Alkaline Phosphatase 181 U/L      Total Protein 7.3 g/dL      Albumin 3.5 g/dL      Total Bilirubin 1.31 mg/dL      eGFR 26 ml/min/1.73sq m     Narrative:      [employer] guidelines for Chronic Kidney Disease (CKD):     Stage 1 with normal or high GFR (GFR > 90 mL/min/1.73 square meters)    Stage 2 Mild CKD (GFR = 60-89 mL/min/1.73 square meters)    Stage 3A Moderate CKD (GFR = 45-59 mL/min/1.73 square meters)    Stage 3B Moderate CKD (GFR = 30-44 mL/min/1.73 square meters)    Stage 4 Severe CKD (GFR = 15-29 mL/min/1.73 square meters)    Stage 5 End Stage CKD (GFR <15 mL/min/1.73 square meters)  Note: GFR calculation  is accurate only with a steady state creatinine    Ammonia [[id number]]  (Abnormal) Collected: 05/25/25 1900    Lab Status: Final result Specimen: Blood from Arm, Left Updated: 05/25/25 1923     Ammonia 168 umol/L     CBC and differential [[id number]]  (Abnormal) Collected: 05/25/25 1900    Lab Status: Final result Specimen: Blood from Arm, Left Updated: 05/25/25 1922     WBC 2.68 Thousand/uL      RBC 2.90 Million/uL      Hemoglobin 8.4 g/dL      Hematocrit 27.0 %      MCV 93 fL      MCH 29.0 pg      MCHC 31.1 g/dL      RDW 17.4 %      MPV 9.3 fL      Platelets 52 Thousands/uL      nRBC 0 /100 WBCs      Segmented % 65 %      Immature Grans % 0 %      Lymphocytes % 18 %      Monocytes % 13 %      Eosinophils Relative 3 %      Basophils Relative 1 %      Absolute Neutrophils 1.73 Thousands/µL      Absolute Immature Grans 0.01 Thousand/uL      Absolute Lymphocytes 0.48 Thousands/µL      Absolute Monocytes 0.34 Thousand/µL      Eosinophils Absolute 0.09 Thousand/µL      Basophils Absolute 0.03 Thousands/µL             No orders to display       Procedures    ED Medication and Procedure Management   Prior to Admission Medications   Prescriptions Last Dose Informant Patient Reported? Taking?   amitriptyline (ELAVIL) 50 mg tablet 5/24/2025  No Yes   Sig: Take 1 tablet (50 mg total) by mouth daily at bedtime   cyclobenzaprine (FLEXERIL) 10 mg tablet 5/24/2025 Noon Pharmacy (Specify) Yes Yes   Sig: Take 10 mg by mouth as needed in the morning and 10 mg as needed at noon and 10 mg as needed in the evening for muscle spasms.   dapsone 25 mg tablet Past Week  Yes Yes   Sig: Take 50 mg by mouth in the morning.   folic acid (FOLVITE) 1 mg tablet   Yes No   Sig: Take 1 mg by mouth daily   lactulose (CHRONULAC) 10 g/15 mL solution 5/24/2025  No Yes   Sig: Take 45 mL (30 g total) by mouth 4 (four) times a day   lidocaine (LIDODERM) 5 % 5/24/2025 Pharmacy (Specify) No Yes   Sig: Apply 1 patch topically over 12 hours daily Remove &  Discard patch within 12 hours or as directed by MD   metoprolol tartrate (LOPRESSOR) 25 mg tablet Not Taking  No No   Sig: Take 0.5 tablets (12.5 mg total) by mouth every 12 (twelve) hours   Patient not taking: Reported on 5/25/2025   midodrine (PROAMATINE) 2.5 mg tablet Not Taking  No No   Sig: Take 4 tablets (10 mg total) by mouth 3 (three) times a day before meals   Patient not taking: Reported on 5/25/2025   pantoprazole (PROTONIX) 40 mg tablet Not Taking  No No   Sig: Take 1 tablet (40 mg total) by mouth daily in the early morning   Patient not taking: Reported on 5/25/2025   polyethylene glycol (MIRALAX) 17 g packet Past Week Pharmacy (Specify) Yes Yes   Sig: Take 17 g by mouth daily as needed   potassium chloride (Klor-Con M20) 20 mEq tablet   No No   Sig: Take 2 tablets (40 mEq total) by mouth daily   rifaximin (XIFAXAN) 550 mg tablet Past Week  Yes Yes   Sig: Take 550 mg by mouth every 12 (twelve) hours   spironolactone (ALDACTONE) 25 mg tablet   No No   Sig: Take 1 tablet (25 mg total) by mouth daily   torsemide (DEMADEX) 20 mg tablet   No No   Sig: Take 1 tablet (20 mg total) by mouth 2 (two) times a day   ursodiol (ACTIGALL) 300 mg capsule Past Week  Yes Yes   Sig: Take 300 mg by mouth in the morning and 300 mg in the evening.      Facility-Administered Medications: None     Current Discharge Medication List        CONTINUE these medications which have NOT CHANGED    Details   amitriptyline (ELAVIL) 50 mg tablet Take 1 tablet (50 mg total) by mouth daily at bedtime  Qty: 30 tablet, Refills: 0    Associated Diagnoses: Hepatic encephalopathy (HCC)      cyclobenzaprine (FLEXERIL) 10 mg tablet Take 10 mg by mouth as needed in the morning and 10 mg as needed at noon and 10 mg as needed in the evening for muscle spasms.      dapsone 25 mg tablet Take 50 mg by mouth in the morning.      lactulose (CHRONULAC) 10 g/15 mL solution Take 45 mL (30 g total) by mouth 4 (four) times a day  Qty: 240 mL, Refills: 0     Associated Diagnoses: Hepatic encephalopathy (HCC)      lidocaine (LIDODERM) 5 % Apply 1 patch topically over 12 hours daily Remove & Discard patch within 12 hours or as directed by MD  Qty: 9 patch, Refills: 0    Associated Diagnoses: Closed compression fracture of body of L1 vertebra (HCC); Sacral fracture (HCC)      polyethylene glycol (MIRALAX) 17 g packet Take 17 g by mouth daily as needed      rifaximin (XIFAXAN) 550 mg tablet Take 550 mg by mouth every 12 (twelve) hours      ursodiol (ACTIGALL) 300 mg capsule Take 300 mg by mouth in the morning and 300 mg in the evening.      folic acid (FOLVITE) 1 mg tablet Take 1 mg by mouth daily      metoprolol tartrate (LOPRESSOR) 25 mg tablet Take 0.5 tablets (12.5 mg total) by mouth every 12 (twelve) hours    Associated Diagnoses: Acute kidney injury (HCC)      midodrine (PROAMATINE) 2.5 mg tablet Take 4 tablets (10 mg total) by mouth 3 (three) times a day before meals  Qty: 360 tablet, Refills: 0    Associated Diagnoses: Hepatic encephalopathy (HCC)      pantoprazole (PROTONIX) 40 mg tablet Take 1 tablet (40 mg total) by mouth daily in the early morning  Qty: 30 tablet, Refills: 0    Associated Diagnoses: Hepatic encephalopathy (HCC)      potassium chloride (Klor-Con M20) 20 mEq tablet Take 2 tablets (40 mEq total) by mouth daily  Qty: 60 tablet, Refills: 0    Associated Diagnoses: Hepatic encephalopathy (HCC)      spironolactone (ALDACTONE) 25 mg tablet Take 1 tablet (25 mg total) by mouth daily  Qty: 30 tablet, Refills: 0    Associated Diagnoses: CHF (congestive heart failure) (HCC)      torsemide (DEMADEX) 20 mg tablet Take 1 tablet (20 mg total) by mouth 2 (two) times a day  Qty: 60 tablet, Refills: 0    Associated Diagnoses: CHF (congestive heart failure) (HCC)           No discharge procedures on file.  ED SEPSIS DOCUMENTATION   Time reflects when diagnosis was documented in both MDM as applicable and the Disposition within this note       Time User Action Codes  Description Comment    5/25/2025  7:48 PM Ashanti Dominique [M54.9] Back pain     5/25/2025  7:49 PM Ashanti Dominique [R79.89] Increased ammonia level     5/25/2025  7:49 PM Ashanti Dominique [N17.9] KISHORE (acute kidney injury) (HCC)                    [1]   Past Medical History:  Diagnosis Date    Back pain     Carcinoid tumor     neuroendocrine    CHF (congestive heart failure) (HCC)     Cirrhosis of liver (HCC)     Hypertension     Sarcoidosis    [2]   Past Surgical History:  Procedure Laterality Date    CHOLECYSTECTOMY      GASTRIC BYPASS      HERNIA REPAIR      IR KYPHOPLASTY/VERTEBROPLASTY  10/15/2024   [3]   Family History  Problem Relation Name Age of Onset    Hypertension Father      Heart attack Father      No Known Problems Sister      No Known Problems Brother      No Known Problems Brother      No Known Problems Brother      No Known Problems Brother      No Known Problems Brother      No Known Problems Daughter     [4]   Social History  Tobacco Use    Smoking status: Never    Smokeless tobacco: Never   Vaping Use    Vaping status: Never Used   Substance Use Topics    Alcohol use: Never    Drug use: Never        Ashanti Dominique PA-C  05/25/25 2132

## 2025-05-26 NOTE — ASSESSMENT & PLAN NOTE
History HYATT liver cirrhosis with hyperammonemia, no confusion or tremors  Lactulose 30 g 4 times daily, rifaximin twice daily  Torsemide and spironolactone on hold  Not currently taking beta-blockade  Monitor MELD labs  No significant ascites on abdominal exam  No history esophageal varices  Continue Protonix

## 2025-05-26 NOTE — ASSESSMENT & PLAN NOTE
Presented with flank pain after fall early this morning  Creatinine 2.05 from baseline 1.04  Possible secondary to HRS versus prerenal with poor oral intake  Torsemide and spironolactone on hold  Trend creatinine  Appreciate nephrology consultation  CT chest abdomen pelvis no evidence of obstructive disease

## 2025-05-26 NOTE — ASSESSMENT & PLAN NOTE
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   Treated with Sandostatin every 4 weeks, received on 5/13   Follows with [facility]

## 2025-05-26 NOTE — ASSESSMENT & PLAN NOTE
History HYATT liver cirrhosis with hyperammonemia, no confusion or tremors  Lactulose 30 g 4 times daily, rifaximin twice daily  Torsemide and spironolactone on hold  Not currently taking beta-blockade  Monitor MELD labs  MELDS-NA=17  No significant ascites on abdominal exam  No history esophageal varices  Continue Protonix   MND level is elevated, trending down,

## 2025-05-26 NOTE — CONSULTS
Consultation - Nephrology   Bailey ZOË Olsen 54 y.o. female MRN: [id number]  Unit/Bed#: -01 Encounter: [id number]      A/P:  1.  Acute kidney injury on top of chronic kidney disease   Craine improved status post volume expansion and holding of diuretics.  Will continue to hold diuretics for now.  In general, volume management appears to be significant issue with this patient.  Please refer below regarding this matter.  Will continue to avoid potential for toxins, look to optimize care as best as possible from volume standpoint the outpatient setting.  2.  Chronic kidney disease stage IIIa with baseline creatinine between 1-1.1 mg/dL  3.  Volume depletion   Patient continued to take torsemide 20 mg twice daily even if she was not eating and drinking well, and if she had no swelling.  This was in addition to the spironolactone 25 mg daily which she also takes.  Strongly recommended that she reduce the torsemide down to once daily in the outpatient setting, and only take twice daily if there is weight gain.  She should be checking her weights every day at home, and if her weight continues to decline too much, she should see her primary care provider or other physician who helps to manage volume status and discuss if patient requires a new estimated dry weight, and how to take her diuretics in accordance with her true volume status.  Patient is complicated and that she is losing true body weight and so there will need to be close clinical follow-up in order to determine how much diuretic the patient should be taking.  4.  Bilateral lower extremity edema   Patient chronically takes torsemide 20 mg twice daily along with spironolactone 25 mg daily.  She was shown how to check for edema in the lower extremities and was strongly recommended to not take torsemide if she did not believe she was volume overloaded.  Continue to educate the patient going forward regarding these matters.  5.  Cirrhosis of liver due to  HYATT   Continue spironolactone to assist with reduction of ascites, patient also on lactulose 30 g 4 times daily and rifaximin twice daily.  This may be contributing to loss of electrolytes and volume in addition to the diuretic.  Patient will need to have close clinical follow-up.  6.  History of recent fall and previous falls   Unclear if all of her episodes are due to volume depletion associated with hypotension, but at least for this specific episode most likely was related to that matter.  Patient was also supposed to be taking midodrine to help prop blood pressures, unclear if she is still taking this or not at this time.  However from the volume standpoint, please refer above regarding voiding depletion.  Patient will be at high risk for continued orthostatic hypotension as well as iatrogenic hypotension due to overdiuresis.  Will need to be monitored very closely.    Case was discussed with our hospitalist colleague, continue with closely monitoring the patient's overall clinical status, creatinine has improved status post volume expansion.  In the outpatient setting, patient will need to follow weights, monitor edema in the bilateral lower extremities, and adjust torsemide dosing in accordance with volume status.  This will be difficult and complicated, will need to have the assistance of primary care provider or other physician helping to manage volume status.  Is the patient's second admission in about 20 days.    Total time required for this consultation was 65 minutes.       Thank you for allowing us to participate in the care of your patient.     Please feel free to contact us regarding the care of this patient, or any other questions/concerns that may be applicable.    Problem List[1]    History of Present Illness   Physician Requesting Consult: Ruslan Keyes MD  Reason for Consult / Principal Problem: Acute kidney injury  Hx and PE limited by:   HPI: Bailey Olsen is a 54 y.o. year old female  who presented to the emergency room after suffering a fall and having severe back pain as a consequence.  Patient had blood work done which noted acute kidney injury.  Patient admits that she has had reduced oral intake of food and fluids, she is trying to lose weight and has been trying to reduce solutes in general.  At the same time, she has cirrhosis and is taking lactulose 30 g 4 times daily and spironolactone daily, also has chronic bilateral lower extremity edema for which she takes torsemide twice daily.  Patient does not check lower extremities for edema, and does not alter her medications depending on her overall status either subjectively or objectively.    Of note, the patient was admitted into the hospital and eventually discharged earlier this month, May 2025 with discharge date on May 6 after she had suffered another fall at home.  At that time patient's diuretics were held, ACE inhibitor held, and recommended to reinitiate home dosing of midodrine.    Patient's creatinine at presentation was around 2.05 mg/dL, this morning it is down to 1.63 mg/dL status post volume expansion.  Patient denies use of nonsteroidal anti-inflammatory medications in the outpatient setting.    History obtained from chart review and the patient    Constitutional ROS- Denies fever, chills, night sweats, weight changes.  Positive for fatigue  HEENT ROS- Denies history of eye surgeries, glaucoma, headaches or history of trauma, blurred vision..  Endocrine ROS- No history diabetes mellitus or thyroid disease.  Cardiovascular ROS- Denies chest pain, palpitation, dyspnea exertion, orthopnea, claudication.  Pulmonary ROS- Denies history of COPD, asthma.  Denies cough, hemoptysis, shortness of breath.  GI ROS- Denies abdominal pain, diarrhea, nausea, swallowing problems, vomiting, constipation, blood in stools, fecal incontinence.   Hematological ROS- Denies history of easy bruising, blood clots, bleeding or blood  transfusions.  Genitourinary ROS- Denies recent hematuria, pyuria, flank pain, change in urinary stream, decreased urinary output, increased urinary frequency, nocturia, foamy urine, or urinary incontinence.  Lymphatic ROS- Denies lymphadenopathy.  Musculoskeletal ROS- Denies history of muscle weakness, joint pain.  Positive for upper back pain at the area of the fall  Dermatological ROS- Denies rash, wounds, ulcers, itching, jaundice.  Psychiatric ROS- Denies anxiety, depression, hallucinations, disorientation.  Neurological ROS- No stroke or TIA symptoms. .    Historical Information   Past Medical History[2]  Past Surgical History[3]  Social History   Social History     Substance and Sexual Activity   Alcohol Use Never     Social History     Substance and Sexual Activity   Drug Use Never     Tobacco Use History[4]  Family History[5]    Meds/Allergies   all current active meds have been reviewed, current meds:   Current Facility-Administered Medications:     amitriptyline (ELAVIL) tablet 50 mg, HS    cyclobenzaprine (FLEXERIL) tablet 10 mg, TID PRN    folic acid (FOLVITE) tablet 1 mg, Daily    lactulose (CHRONULAC) oral solution 30 g, 4x Daily    ondansetron (ZOFRAN) injection 4 mg, Q6H PRN    oxyCODONE (ROXICODONE) IR tablet 5 mg, Q6H PRN    pantoprazole (PROTONIX) EC tablet 40 mg, Early Morning    rifaximin (XIFAXAN) tablet 550 mg, Q12H MIGUEL and PTA meds:    Medications Prior to Admission:     amitriptyline (ELAVIL) 50 mg tablet    cyclobenzaprine (FLEXERIL) 10 mg tablet    dapsone 25 mg tablet    lactulose (CHRONULAC) 10 g/15 mL solution    lidocaine (LIDODERM) 5 %    polyethylene glycol (MIRALAX) 17 g packet    rifaximin (XIFAXAN) 550 mg tablet    ursodiol (ACTIGALL) 300 mg capsule    folic acid (FOLVITE) 1 mg tablet    metoprolol tartrate (LOPRESSOR) 25 mg tablet    midodrine (PROAMATINE) 2.5 mg tablet    pantoprazole (PROTONIX) 40 mg tablet    potassium chloride (Klor-Con M20) 20 mEq tablet    spironolactone  (ALDACTONE) 25 mg tablet    torsemide (DEMADEX) 20 mg tablet      Allergies[6]    Objective     Intake/Output Summary (Last 24 hours) at 5/26/2025 1247  Last data filed at 5/26/2025 1038  Gross per 24 hour   Intake 240 ml   Output 759 ml   Net -519 ml       Invasive Devices:        Physical Exam      I/O last 3 completed shifts:  In: -   Out: 125 [Urine:125]    Vitals:    05/26/25 0736   BP: 126/71   Pulse: 91   Resp:    Temp: 97.5 °F (36.4 °C)   SpO2: 93%       General Appearance:    No acute distress. Cooperative. Appears stated age.   Head:    Normocephalic. Atraumatic. Normal jaw occlusion.   Eyes:    Lids, conjunctiva normal. No scleral icterus.   Ears:    Normal external ears.   Nose:   Nares normal. No drainage.   Mouth:   Lips, tongue normal. Mucosa normal. Phonation normal.   Neck:   Supple. Symmetrical.   Back:     Symmetric. No CVA tenderness.   Lungs:     Normal respiratory effort. Clear to auscultation bilaterally.   Chest wall:    No tenderness or deformity.   Heart:    Regular rate and rhythm. Normal S1 and S2. No murmur. No JVD. No edema.   Abdomen:     Soft. Non-tender. Bowel sounds active.   Genitourinary:   No Fiore catheter present.   Extremities:   Extremities normal. Atraumatic. No cyanosis.   Skin:   Warm and dry. No pallor, jaundice, rash, ecchymoses.   Neurologic:   Alert and oriented to person, place, time. No focal deficit.         Current Weight: Weight - Scale: 121 kg (266 lb 12.1 oz)  First Weight: Weight - Scale: 121 kg (266 lb 12.1 oz)    Lab Results:  I have personally reviewed pertinent labs.    CBC:   Lab Results   Component Value Date    WBC 1.97 (L) 05/26/2025    HGB 7.9 (L) 05/26/2025    HCT 25.7 (L) 05/26/2025    MCV 93 05/26/2025    PLT 81 (L) 05/26/2025    RBC 2.76 (L) 05/26/2025    MCH 28.6 05/26/2025    MCHC 30.7 (L) 05/26/2025    RDW 17.8 (H) 05/26/2025    MPV 11.5 05/26/2025    NRBC 0 05/25/2025     CMP:   Lab Results   Component Value Date    K 4.4 05/26/2025      "05/26/2025    CO2 23 05/26/2025    BUN 22 05/26/2025    CREATININE 1.63 (H) 05/26/2025    CALCIUM 8.4 05/26/2025    AST 47 (H) 05/26/2025    ALT 16 05/26/2025    ALKPHOS 176 (H) 05/26/2025    EGFR 35 05/26/2025     Phosphorus: No results found for: \"PHOS\"  Magnesium:   Lab Results   Component Value Date    MG 2.1 05/26/2025     Urinalysis: No results found for: \"COLORU\", \"CLARITYU\", \"SPECGRAV\", \"PHUR\", \"LEUKOCYTESUR\", \"NITRITE\", \"PROTEINUA\", \"GLUCOSEU\", \"KETONESU\", \"BILIRUBINUR\", \"BLOODU\"  Ionized Calcium: No results found for: \"CAION\"  Coagulation:   Lab Results   Component Value Date    INR 1.31 (H) 05/26/2025     Troponin: No results found for: \"TROPONINI\"  ABG: No results found for: \"PHART\", \"AUV2FPS\", \"PO2ART\", \"WNA9SGR\", \"I7OFHNJF\", \"BEART\", \"SOURCE\"    Results from last 7 days   Lab Units 05/26/25  0449 05/25/25  1900 05/25/25  0437   POTASSIUM mmol/L 4.4 4.7 4.8   CHLORIDE mmol/L 106 101 101   CO2 mmol/L 23 28 24   BUN mg/dL 22 25 21   CREATININE mg/dL 1.63* 2.05* 1.71*   CALCIUM mg/dL 8.4 8.6 8.8   ALK PHOS U/L 176* 181* 205*   ALT U/L 16 20 21   AST U/L 47* 45* 54*       Radiology review:  Procedure: CT recon only thoracolumbar (no charge)  Result Date: 5/25/2025  Narrative: CT THORACIC AND LUMBAR SPINE INDICATION:   fall, head injury, neck pain, abd pain, back pain, chest pain. COMPARISON: Concurrent CT chest abdomen pelvis exam; prior CT 5/2/2025 and 4/21/2025 TECHNIQUE: Axial CT examination of the thoracic and lumbar spine was obtained utilizing reconstructed images from CT of the chest abdomen and pelvis performed the same day. Images were reformatted in the sagittal and coronal planes. This examination, like all CT scans performed in the [facility], was performed utilizing techniques to minimize radiation dose exposure, including the use of iterative reconstruction and automated exposure control. FINDINGS: ALIGNMENT: Mild rotatory dextroscoliosis midthoracic region again noted. " VERTEBRAE: No acute fracture or traumatic malalignment in the thoracic or lumbar spine. Chronic anterior wedge compression fracture deformity L1 again noted with approximately 50% loss of height anteriorly. Minimal osseous retropulsion of the posterior superior aspect of the L1 vertebral body is again noted, status post percutaneous vertebral augmentation, slightly impressing upon the thecal sac but not significantly narrowing the spinal canal. DEGENERATIVE CHANGES: Mild multilevel discogenic disease. Minimal disc bulging L3-4, L4-5 and L5-S1 without appreciable central canal narrowing. PREVERTEBRAL AND PARASPINAL SOFT TISSUES: Normal. OTHER: Unremarkable     Impression: 1. No acute fracture or traumatic subluxation in the thoracolumbar spine. 2. Stable remote anterior wedge compression fracture L1 vertebral body status post percutaneous vertebral augmentation. Workstation performed: [id number]     Procedure: CT chest abdomen pelvis wo contrast  Result Date: 5/25/2025  Narrative: CT CHEST, ABDOMEN AND PELVIS WITHOUT IV CONTRAST INDICATION: fall, head injury, neck pain, abd pain, back pain, chest pain, CKD. COMPARISON: CT 5/2/2025 TECHNIQUE: CT examination of the chest, abdomen and pelvis was performed without intravenous contrast. Multiplanar 2D reformatted images were created from the source data. This examination, like all CT scans performed in the [facility], was performed utilizing techniques to minimize radiation dose exposure, including the use of iterative reconstruction and automated exposure control. Radiation dose length product (DLP) for this visit: 1672 mGy-cm. Enteric Contrast: Not administered. FINDINGS: CHEST LUNGS: Relatively stable nonspecific groundglass opacification anterior segment left upper lobe which may be postinflammatory in nature. Lungs appear otherwise clear. Central airways are patent. PLEURA: Unremarkable. HEART/GREAT VESSELS: Heart is unremarkable for patient's age.  No thoracic aortic aneurysm. MEDIASTINUM AND TREVON: Scattered calcified mediastinal and hilar lymph nodes. CHEST WALL AND LOWER NECK: Unremarkable. ABDOMEN LIVER/BILIARY TREE: Cirrhosis. Small amount of perihepatic ascites and splenomegaly suggest at least some degree of portal hypertension. GALLBLADDER: Post cholecystectomy. SPLEEN: Splenomegaly measuring 17.4 cm craniocaudal. PANCREAS: Unremarkable. ADRENAL GLANDS: Unremarkable. KIDNEYS/URETERS: Unremarkable. No hydronephrosis. STOMACH AND BOWEL: Status post Sylvie-en-Y gastric bypass. No bowel wall thickening or intestinal obstruction. APPENDIX: No findings to suggest appendicitis. ABDOMINOPELVIC CAVITY: Trace perihepatic ascites again noted. No pneumoperitoneum. No adenopathy. VESSELS: Unremarkable for patient's age. PELVIS REPRODUCTIVE ORGANS: Unremarkable for patient's age. URINARY BLADDER: Unremarkable. ABDOMINAL WALL/INGUINAL REGIONS: Postoperative change reflecting ventral abdominal hernia repair. BONES: Healing fractures of the anterior right fourth and lateral right 10th ribs again noted. Remote right seventh and eighth rib fractures costochondral junction. No acute fracture. Mild mid thorax dextroscoliosis. Compression fracture L1 vertebral body status post percutaneous vertebral augmentation.     Impression: 1. No acute abnormality identified in the chest, abdomen, or pelvis. 2. Healing right fourth and 10th rib fractures. 3. Cirrhosis with small amount of perihepatic ascites and splenomegaly consistent with portal hypertension. 4. Stable mild nonspecific groundglass opacity left upper lobe likely postinflammatory. 5. Additional findings as noted. The study was marked in EPIC for immediate notification. Computerized Assisted Algorithm (CAA) may have aided analysis of applicable images. Workstation performed: [id number]     Procedure: CT spine cervical without contrast  Result Date: 5/25/2025  Narrative: CT CERVICAL SPINE - WITHOUT CONTRAST INDICATION:    fall, head injury, neck pain, abd pain, back pain, chest pain, CKD. COMPARISON: CT 5/2/2025 TECHNIQUE:  CT examination of the cervical spine was performed without intravenous contrast.  Contiguous axial images were obtained. Multiplanar 2D reformatted images were created from the source data. Radiation dose length product (DLP) for this visit:  435 mGy-cm. .  This examination, like all CT scans performed in the [facility], was performed utilizing techniques to minimize radiation dose exposure, including the use of iterative reconstruction and automated exposure control. IMAGE QUALITY:  Diagnostic. FINDINGS: ALIGNMENT:  Normal alignment of the cervical spine. No subluxation. VERTEBRAE:  No fracture. DEGENERATIVE CHANGES: Mild multilevel cervical degenerative changes are noted without critical central canal stenosis. PREVERTEBRAL AND PARASPINAL SOFT TISSUES: Unremarkable THORACIC INLET:  Normal.     Impression: No cervical spine fracture or traumatic malalignment. The study was marked in EPIC for immediate notification. Workstation performed: [id number]     Procedure: CT head without contrast  Result Date: 5/25/2025  Narrative: CT BRAIN - WITHOUT CONTRAST INDICATION:   fall, head injury, neck pain, abd pain, back pain, chest pain, CKD. Fall (Pt reports falling in the bathroom approximately an hour ago. States she hit the back of her head on the sink. Denies BT or LOC. C/o back pain and head pain.) COMPARISON: CT 5/2/2025 TECHNIQUE:  CT examination of the brain was performed.  Multiplanar 2D reformatted images were created from the source data. Radiation dose length product (DLP) for this visit:  941 mGy-cm. .  This examination, like all CT scans performed in the [facility], was performed utilizing techniques to minimize radiation dose exposure, including the use of iterative reconstruction and automated exposure control. IMAGE QUALITY:  Diagnostic. FINDINGS: PARENCHYMA:No intracranial  mass, mass effect or midline shift. No CT signs of acute infarction.  No acute parenchymal hemorrhage. VENTRICLES AND EXTRA-AXIAL SPACES:  Normal for the patient's age. VISUALIZED ORBITS: Normal visualized orbits. PARANASAL SINUSES: Normal visualized paranasal sinuses. CALVARIUM AND EXTRACRANIAL SOFT TISSUES: Small right paramedian occipital scalp hematoma without underlying calvarial fracture.     Impression: 1. Small right occipital scalp hematoma without evidence for fracture. 2. No acute intracranial abnormality identified. The study was marked in EPIC for immediate notification. Workstation performed: [id number]       Karthikeyan Gayle DO      This consultation note was produced in part using a dictation device which may document imprecise wording from author's original intent.           [1]   Patient Active Problem List  Diagnosis    Liver cirrhosis secondary to HYATT (nonalcoholic steatohepatitis) (HCC)    History of malignant carcinoid tumor of small intestine    Class 3 severe obesity due to excess calories in adult    Primary hypertension    Hypokalemia    Fibromyalgia    Iron deficiency anemia    Pancytopenia (HCC)    Sarcoidosis    Bilateral pulmonary infiltrates    Hepatic encephalopathy (HCC)    Fall    Hyperammonemia (HCC)    Intractable back pain    Ambulatory dysfunction    Closed stable burst fracture of first lumbar vertebra with routine healing    Groin hematoma    Fall from standing    Closed compression fracture of L1 lumbar vertebra, sequela    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)    KISHORE (acute kidney injury) (HCC)    Morbid obesity with BMI of 40.0-44.9, adult (HCC)    Sacral fracture, closed (HCC)    Acute metabolic encephalopathy    SIRS (systemic inflammatory response syndrome) (HCC)    Upper GI bleed    Acute anemia    Rectal bleeding    Acute blood loss anemia    Chronic, continuous use of opioids    Carcinoid tumor of pancreas    Hypomagnesemia     Acute respiratory failure (HCC)    Gram-negative bacteremia    Falls    Chest pain    Abdominal pain    Acute kidney injury (HCC)    Scalp hematoma    Contusion of back    Ecchymosis   [2]   Past Medical History:  Diagnosis Date    Back pain     Carcinoid tumor     neuroendocrine    CHF (congestive heart failure) (HCC)     Cirrhosis of liver (HCC)     Hypertension     Sarcoidosis    [3]   Past Surgical History:  Procedure Laterality Date    CHOLECYSTECTOMY      GASTRIC BYPASS      HERNIA REPAIR      IR KYPHOPLASTY/VERTEBROPLASTY  10/15/2024   [4]   Social History  Tobacco Use   Smoking Status Never   Smokeless Tobacco Never   [5]   Family History  Problem Relation Name Age of Onset    Hypertension Father      Heart attack Father      No Known Problems Sister      No Known Problems Brother      No Known Problems Brother      No Known Problems Brother      No Known Problems Brother      No Known Problems Brother      No Known Problems Daughter     [6]   Allergies  Allergen Reactions    Zolpidem GI Intolerance, Other (See Comments) and Hives     Other Reaction(s): Other      Blacks out    Blacks out    Tapentadol Sneezing and Other (See Comments)     Zoned out    Alprazolam Other (See Comments)     Other Reaction(s): Other      Feels zoned out    Feels zoned out    Amoxicillin-Pot Clavulanate Hives and Nausea Only     Amoxil fine    Dust Mite Extract Sneezing    Morphine Headache, Nausea Only and Vomiting    Tramadol GI Intolerance    Azelastine Rash    Azelastine Hcl Rash    Molds & Smuts Rash     Other Reaction(s): Unknown

## 2025-05-26 NOTE — ASSESSMENT & PLAN NOTE
Located in upper back  Patient has liver cirrhosis, with thrombocytopenia, platelet is improving  Also continue to monitor hemoglobin, transfuse as needed

## 2025-05-26 NOTE — H&P
H&P - Hospitalist   Name: Bailey Olsen 54 y.o. female I MRN: [id number]  Unit/Bed#: -01 I Date of Admission: 5/25/2025   Date of Service: 5/26/2025 I Hospital Day: 1     Assessment & Plan  KISHORE (acute kidney injury) (HCC)  Presented with flank pain after fall early this morning  Creatinine 2.05 from baseline 1.04  Possible secondary to HRS versus prerenal with poor oral intake  Torsemide and spironolactone on hold  Trend creatinine  Appreciate nephrology consultation  CT chest abdomen pelvis no evidence of obstructive disease  Liver cirrhosis secondary to HYATT (nonalcoholic steatohepatitis) (HCC)  History HYATT liver cirrhosis with hyperammonemia, no confusion or tremors  Lactulose 30 g 4 times daily, rifaximin twice daily  Torsemide and spironolactone on hold  Not currently taking beta-blockade  Monitor MELD labs  No significant ascites on abdominal exam  No history esophageal varices  Continue Protonix   History of malignant carcinoid tumor of small intestine  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   Treated with Sandostatin every 4 weeks, received on 5/13   Follows with [facility] oncology   Pancytopenia (HCC)  Chronic secondary to advanced liver disease  Appears baseline  Morbid obesity with BMI of 40.0-44.9, adult (HCC)  BMI 44  Morbid obesity complicating all aspects of healthcare  Requires intensive lifestyle modification    Falls  Fall at home in bathroom at 3 AM on day of admission   Trauma evaluation revealed scalp  hematoma appreciate PT/OT/case management prior to discharge  Old rib fractures noted on trauma evaluation  Scalp hematoma  S/p fall in bathroom  CT brain- Small right occipital scalp hematoma without evidence for fracture.   Neuroexam nonfocal  Contusion of back  Fall in the bathroom at 3 AM the morning of presentation  Noted to have large ecchymotic area thoracic spine  Hold anticoagulation  Trend H&H      VTE Pharmacologic Prophylaxis:   High Risk (Score >/= 5) - Pharmacological DVT Prophylaxis Contraindicated. Sequential Compression Devices Ordered.  Code Status: Level 1 - Full Code   Discussion with family: Patient declined call to .   Cristian called patient while I was evaluating and she declined my speaking to him    Anticipated Length of Stay: Patient will be admitted on an inpatient basis with an anticipated length of stay of greater than 2 midnights secondary to KISHORE.    History of Present Illness   Chief Complaint: Back pain    Bailey Olsen is a 54 y.o. female with a PMH of multiple falls, HYATT advanced liver cirrhosis, HTN, sarcoidosis, metastatic neuroendocrine tumor of GI origin on Sandostatin q. 28 days who presents with back pain.  Patient reports falling in her bathroom at 3 AM on day of arrival, was seen in the ED underwent trauma evaluation then return to due to worsening back pain and found to have KISHORE prompting medical service evaluation and treatment.  Denies fevers, chills, hematochezia, melena.  States has been in her normal health but has been falling frequently.    Ammonia level elevated on admission, patient reports compliance with lactulose and Xifaxan.    Review of Systems   Constitutional:  Positive for fatigue. Negative for chills and fever.   HENT:  Negative for ear pain and sore throat.    Eyes:  Negative for pain and visual disturbance.   Respiratory:  Negative for cough and shortness of breath.    Cardiovascular:  Negative for chest pain and palpitations.    Gastrointestinal:  Negative for abdominal pain and vomiting.   Genitourinary:  Negative for dysuria and hematuria.   Musculoskeletal:  Positive for arthralgias, gait problem and myalgias. Negative for back pain.   Skin:  Negative for color change and rash.   Neurological:  Negative for seizures and syncope.   Psychiatric/Behavioral:  Positive for confusion.    All other systems reviewed and are negative.      Historical Information   Past Medical History[1]  Past Surgical History[2]  Social History[3]  E-Cigarette/Vaping    E-Cigarette Use Never User      E-Cigarette/Vaping Substances    Nicotine No     THC No     CBD No     Flavoring No     Other No     Unknown No      Family History[4]  Social History:  Marital Status: /Civil Union   Patient Pre-hospital Living Situation: Home  Patient Pre-hospital Level of Mobility: unable to be assessed at time of evaluation  Patient Pre-hospital Diet Restrictions: None    Meds/Allergies   I have reviewed home medications with patient personally.  Prior to Admission medications    Medication Sig Start Date End Date Taking? Authorizing Provider   amitriptyline (ELAVIL) 50 mg tablet Take 1 tablet (50 mg total) by mouth daily at bedtime 3/1/25  Yes Mar Oliva MD   cyclobenzaprine (FLEXERIL) 10 mg tablet Take 10 mg by mouth as needed in the morning and 10 mg as needed at noon and 10 mg as needed in the evening for muscle spasms.   Yes Historical Provider, MD   dapsone 25 mg tablet Take 50 mg by mouth in the morning.   Yes Historical Provider, MD   lactulose (CHRONULAC) 10 g/15 mL solution Take 45 mL (30 g total) by mouth 4 (four) times a day 2/13/25  Yes Idalia Stuart MD   lidocaine (LIDODERM) 5 % Apply 1 patch topically over 12 hours daily Remove & Discard patch within 12 hours or as directed by MD 8/4/24  Yes Ruslan Keyes MD   polyethylene glycol (MIRALAX) 17 g packet Take 17 g by mouth daily as needed   Yes Historical Provider, MD   rifaximin (XIFAXAN) 550 mg  tablet Take 550 mg by mouth every 12 (twelve) hours   Yes Historical Provider, MD   ursodiol (ACTIGALL) 300 mg capsule Take 300 mg by mouth in the morning and 300 mg in the evening.   Yes Historical Provider, MD   folic acid (FOLVITE) 1 mg tablet Take 1 mg by mouth daily 8/4/16 2/28/25  Historical Provider, MD   metoprolol tartrate (LOPRESSOR) 25 mg tablet Take 0.5 tablets (12.5 mg total) by mouth every 12 (twelve) hours  Patient not taking: Reported on 5/25/2025 5/6/25   Mar Oliva MD   midodrine (PROAMATINE) 2.5 mg tablet Take 4 tablets (10 mg total) by mouth 3 (three) times a day before meals  Patient not taking: Reported on 5/25/2025 2/13/25   Idalia Stuart MD   pantoprazole (PROTONIX) 40 mg tablet Take 1 tablet (40 mg total) by mouth daily in the early morning  Patient not taking: Reported on 5/25/2025 2/13/25   Idalia Stuart MD   potassium chloride (Klor-Con M20) 20 mEq tablet Take 2 tablets (40 mEq total) by mouth daily 2/13/25 5/2/25  Idalia Stuart MD   spironolactone (ALDACTONE) 25 mg tablet Take 1 tablet (25 mg total) by mouth daily 2/13/25 5/2/25  Idalia Stuart MD   torsemide (DEMADEX) 20 mg tablet Take 1 tablet (20 mg total) by mouth 2 (two) times a day 2/13/25 5/2/25  Idalia Stuart MD     Allergies   Allergen Reactions    Zolpidem GI Intolerance, Other (See Comments) and Hives     Other Reaction(s): Other      Blacks out    Blacks out    Tapentadol Sneezing and Other (See Comments)     Zoned out    Alprazolam Other (See Comments)     Other Reaction(s): Other      Feels zoned out    Feels zoned out    Amoxicillin-Pot Clavulanate Hives and Nausea Only     Amoxil fine    Dust Mite Extract Sneezing    Morphine Headache, Nausea Only and Vomiting    Tramadol GI Intolerance    Azelastine Rash    Azelastine Hcl Rash    Molds & Smuts Rash     Other Reaction(s): Unknown       Objective :  Temp:  [97.7 °F (36.5 °C)-98.4 °F (36.9 °C)] 97.7 °F (36.5 °C)  HR:  [93-99] 93  BP: (99141)/(53-80) 99/53  Resp:  [17-19]  17  SpO2:  [93 %-99 %] 93 %  O2 Device: None (Room air)    Physical Exam  Vitals and nursing note reviewed.   Constitutional:       General: She is not in acute distress.     Appearance: She is well-developed. She is obese. She is ill-appearing.   HENT:      Head: Normocephalic and atraumatic.      Mouth/Throat:      Mouth: Mucous membranes are dry.     Eyes:      Conjunctiva/sclera: Conjunctivae normal.       Cardiovascular:      Rate and Rhythm: Normal rate and regular rhythm.      Heart sounds: No murmur heard.  Pulmonary:      Effort: Pulmonary effort is normal. No respiratory distress.      Breath sounds: Normal breath sounds.   Abdominal:      Palpations: Abdomen is soft.      Tenderness: There is no abdominal tenderness.     Musculoskeletal:         General: No swelling.      Cervical back: Neck supple.     Skin:     General: Skin is warm and dry.      Capillary Refill: Capillary refill takes less than 2 seconds.      Coloration: Skin is pale.      Findings: Bruising present.     Neurological:      General: No focal deficit present.      Mental Status: She is alert and oriented to person, place, and time.      Comments: Slow responses but oriented x 3   Psychiatric:         Mood and Affect: Mood normal.         Speech: Speech is delayed.         Behavior: Behavior normal.        Thoracic back contusion        Lab Results: I have reviewed the following results:  Results from last 7 days   Lab Units 05/25/25  1900   WBC Thousand/uL 2.68*   HEMOGLOBIN g/dL 8.4*   HEMATOCRIT % 27.0*   PLATELETS Thousands/uL 52*   SEGS PCT % 65   LYMPHO PCT % 18   MONO PCT % 13*   EOS PCT % 3     Results from last 7 days   Lab Units 05/25/25  1900   SODIUM mmol/L 136   POTASSIUM mmol/L 4.7   CHLORIDE mmol/L 101   CO2 mmol/L 28   BUN mg/dL 25   CREATININE mg/dL 2.05*   ANION GAP mmol/L 7   CALCIUM mg/dL 8.6   ALBUMIN g/dL 3.5   TOTAL BILIRUBIN mg/dL 1.31*   ALK PHOS U/L 181*   ALT U/L 20   AST U/L 45*   GLUCOSE RANDOM mg/dL 114              Lab Results   Component Value Date    HGBA1C 4.7 02/26/2025    HGBA1C 5.9 (H) 02/27/2024    HGBA1C 5.9 08/03/2023     Results from last 7 days   Lab Units 05/25/25  1900   LACTIC ACID mmol/L 1.1       Imaging Results Review: I reviewed radiology reports from this admission including: CT chest, CT abdomen/pelvis, CT head, and CT C-spine.  Other Study Results Review: No additional pertinent studies reviewed.      ** Please Note: This note has been constructed using a voice recognition system. **         [1]   Past Medical History:  Diagnosis Date    Back pain     Carcinoid tumor     neuroendocrine    CHF (congestive heart failure) (HCC)     Cirrhosis of liver (HCC)     Hypertension     Sarcoidosis    [2]   Past Surgical History:  Procedure Laterality Date    CHOLECYSTECTOMY      GASTRIC BYPASS      HERNIA REPAIR      IR KYPHOPLASTY/VERTEBROPLASTY  10/15/2024   [3]   Social History  Tobacco Use    Smoking status: Never    Smokeless tobacco: Never   Vaping Use    Vaping status: Never Used   Substance and Sexual Activity    Alcohol use: Never    Drug use: Never    Sexual activity: Not Currently   [4]   Family History  Problem Relation Name Age of Onset    Hypertension Father      Heart attack Father      No Known Problems Sister      No Known Problems Brother      No Known Problems Brother      No Known Problems Brother      No Known Problems Brother      No Known Problems Brother      No Known Problems Daughter

## 2025-05-26 NOTE — PROGRESS NOTES
Progress Note - Hospitalist   Name: Bailey Olsen 54 y.o. female I MRN: [id number]  Unit/Bed#: -01 I Date of Admission: 5/25/2025   Date of Service: 5/26/2025 I Hospital Day: 1    Assessment & Plan  KISHORE (acute kidney injury) (HCC)  Presented with flank pain after fall early this morning  Creatinine 2.05 from baseline 1.04-creatinine is slowly improving, today creatinine is 1.63  Possible secondary to HRS versus prerenal with poor oral intake  Torsemide and spironolactone on hold  Trend creatinine  Appreciate nephrology consultation  CT chest abdomen pelvis no evidence of obstructive disease  Liver cirrhosis secondary to HYATT (nonalcoholic steatohepatitis) (HCC)  History HYATT liver cirrhosis with hyperammonemia, no confusion or tremors  Lactulose 30 g 4 times daily, rifaximin twice daily  Torsemide and spironolactone on hold  Not currently taking beta-blockade  Monitor MELD labs  MELDS-NA=17  No significant ascites on abdominal exam  No history esophageal varices  Continue Protonix   MND level is elevated, trending down,  History of malignant carcinoid tumor of small intestine  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   Treated with Sandostatin every 4 weeks, received on 5/13   Follows with [facility] oncology   Pancytopenia (HCC)  Chronic secondary to advanced liver disease  Appears baseline  Morbid obesity with BMI of 40.0-44.9, adult (HCC)  BMI 44  Morbid obesity complicating all aspects of healthcare  Requires intensive lifestyle  modification    Falls  Fall at home in bathroom at 3 AM on day of admission   Trauma evaluation revealed scalp hematoma appreciate PT/OT/case management prior to discharge  Old rib fractures noted on trauma evaluation  Scalp hematoma  S/p fall in bathroom  CT brain- Small right occipital scalp hematoma without evidence for fracture.   Neuroexam nonfocal  Contusion of back  Fall in the bathroom at 3 AM the morning of presentation  Noted to have large ecchymotic area thoracic spine  Hold anticoagulation  Trend H&H  Ecchymosis  Located in upper back  Patient has liver cirrhosis, with thrombocytopenia, platelet is improving  Also continue to monitor hemoglobin, transfuse as needed    VTE Pharmacologic Prophylaxis:   Moderate Risk (Score 3-4) - Pharmacological DVT Prophylaxis Ordered: Anticoagulation remain on hold due to scalp hematoma, if remained stable, can consider to start heparin.    Mobility:   Basic Mobility Inpatient Raw Score: 13  JH-HLM Goal: 4: Move to chair/commode  JH-HLM Achieved: 2: Bed activities/Dependent transfer  JH-HLM Goal NOT achieved. Continue with multidisciplinary rounding and encourage appropriate mobility to improve upon JH-HLM goals.    Patient Centered Rounds: I performed bedside rounds with nursing staff today.   Discussions with Specialists or Other Care Team Provider: Nephrology    Education and Discussions with Family / Patient: Updated  () via phone.    Current Length of Stay: 1 day(s)  Current Patient Status: Inpatient   Certification Statement: The patient will continue to require additional inpatient hospital stay due to KISHORE, liver cirrhosis, scalp hematoma-pending nephrology evaluation  Discharge Plan: Anticipate discharge in 24-48 hrs to to be determined    Code Status: Level 1 - Full Code    Subjective   Seen and evaluated during the run.  Resting comfortably.  Denies any significant complaint other than pain on the back of the head.    Objective :  Temp:   [97.5 °F (36.4 °C)-98.4 °F (36.9 °C)] 97.5 °F (36.4 °C)  HR:  [91-99] 91  BP: ()/(53-80) 126/71  Resp:  [17-18] 17  SpO2:  [93 %-99 %] 93 %  O2 Device: None (Room air)    Body mass index is 44.39 kg/m².     Input and Output Summary (last 24 hours):     Intake/Output Summary (Last 24 hours) at 5/26/2025 0829  Last data filed at 5/26/2025 0742  Gross per 24 hour   Intake --   Output 464 ml   Net -464 ml       Physical Exam  Vitals and nursing note reviewed.   Constitutional:       Appearance: She is obese. She is not ill-appearing or diaphoretic.   HENT:      Head:      Comments: Occipital scalp hematoma     Mouth/Throat:      Mouth: Mucous membranes are moist.     Eyes:      Extraocular Movements: Extraocular movements intact.      Conjunctiva/sclera: Conjunctivae normal.      Pupils: Pupils are equal, round, and reactive to light.       Cardiovascular:      Rate and Rhythm: Normal rate.      Heart sounds: No murmur heard.     No friction rub. No gallop.   Pulmonary:      Effort: Pulmonary effort is normal. No respiratory distress.      Breath sounds: No stridor. No wheezing or rhonchi.   Abdominal:      General: There is no distension.      Palpations: There is no mass.      Tenderness: There is no abdominal tenderness.      Hernia: No hernia is present.     Musculoskeletal:      Right lower leg: No edema.      Left lower leg: No edema.     Neurological:      Mental Status: She is alert and oriented to person, place, and time.      Cranial Nerves: No cranial nerve deficit.      Sensory: No sensory deficit.      Motor: No weakness.      Coordination: Coordination normal.           Lines/Drains:              Lab Results: I have reviewed the following results:   Results from last 7 days   Lab Units 05/26/25  0449 05/25/25  1900   WBC Thousand/uL 1.97* 2.68*   HEMOGLOBIN g/dL 7.9* 8.4*   HEMATOCRIT % 25.7* 27.0*   PLATELETS Thousands/uL 81* 52*   SEGS PCT %  --  65   LYMPHO PCT %  --  18   MONO PCT %  --  13*    EOS PCT %  --  3     Results from last 7 days   Lab Units 05/26/25  0449   SODIUM mmol/L 137   POTASSIUM mmol/L 4.4   CHLORIDE mmol/L 106   CO2 mmol/L 23   BUN mg/dL 22   CREATININE mg/dL 1.63*   ANION GAP mmol/L 8   CALCIUM mg/dL 8.4   ALBUMIN g/dL 3.2*   TOTAL BILIRUBIN mg/dL 1.38*   ALK PHOS U/L 176*   ALT U/L 16   AST U/L 47*   GLUCOSE RANDOM mg/dL 108     Results from last 7 days   Lab Units 05/26/25  0449   INR  1.31*             Results from last 7 days   Lab Units 05/25/25  1900   LACTIC ACID mmol/L 1.1       Recent Cultures (last 7 days):         Imaging Results Review: No pertinent imaging studies reviewed.  Other Study Results Review: No additional pertinent studies reviewed.    Last 24 Hours Medication List:     Current Facility-Administered Medications:     amitriptyline (ELAVIL) tablet 50 mg, HS    cyclobenzaprine (FLEXERIL) tablet 10 mg, TID PRN    folic acid (FOLVITE) tablet 1 mg, Daily    lactulose (CHRONULAC) oral solution 30 g, 4x Daily    ondansetron (ZOFRAN) injection 4 mg, Q6H PRN    oxyCODONE (ROXICODONE) IR tablet 5 mg, Q6H PRN    pantoprazole (PROTONIX) EC tablet 40 mg, Early Morning    rifaximin (XIFAXAN) tablet 550 mg, Q12H MIGUEL    Administrative Statements   Today, Patient Was Seen By: Ruslan Keyes MD      **Please Note: This note may have been constructed using a voice recognition system.**

## 2025-05-26 NOTE — ASSESSMENT & PLAN NOTE
S/p fall in bathroom  CT brain- Small right occipital scalp hematoma without evidence for fracture.   Neuroexam nonfocal

## 2025-05-26 NOTE — PLAN OF CARE
Problem: PAIN - ADULT  Goal: Verbalizes/displays adequate comfort level or baseline comfort level  Description: Interventions:  - Encourage patient to monitor pain and request assistance  - Assess pain using appropriate pain scale  - Administer analgesics as ordered based on type and severity of pain and evaluate response  - Implement non-pharmacological measures as appropriate and evaluate response  - Consider cultural and social influences on pain and pain management  - Notify physician/advanced practitioner if interventions unsuccessful or patient reports new pain  - Educate patient/family on pain management process including their role and importance of  reporting pain   - Provide non-pharmacologic/complimentary pain relief interventions  Outcome: Progressing     Problem: INFECTION - ADULT  Goal: Absence or prevention of progression during hospitalization  Description: INTERVENTIONS:  - Assess and monitor for signs and symptoms of infection  - Monitor lab/diagnostic results  - Monitor all insertion sites, i.e. indwelling lines, tubes, and drains  - Monitor endotracheal if appropriate and nasal secretions for changes in amount and color  - [place] appropriate cooling/warming therapies per order  - Administer medications as ordered  - Instruct and encourage patient and family to use good hand hygiene technique  - Identify and instruct in appropriate isolation precautions for identified infection/condition  Outcome: Progressing  Goal: Absence of fever/infection during neutropenic period  Description: INTERVENTIONS:  - Monitor WBC  - Perform strict hand hygiene  - Limit to healthy visitors only  - No plants, dried, fresh or silk flowers with ortiz in patient room  Outcome: Progressing     Problem: SAFETY ADULT  Goal: Patient will remain free of falls  Description: INTERVENTIONS:  - Educate patient/family on patient safety including physical limitations  - Instruct patient to call for assistance with activity   -  Consider consulting OT/PT to assist with strengthening/mobility based on AM PAC & JH-HLM score  - Consult OT/PT to assist with strengthening/mobility   - Keep Call bell within reach  - Keep bed low and locked with side rails adjusted as appropriate  - Keep care items and personal belongings within reach  - Initiate and maintain comfort rounds  - Make Fall Risk Sign visible to staff  - Offer Toileting every  Hours, in advance of need  - Initiate/Maintain alarm  - Obtain necessary fall risk management equipment:   - Apply yellow socks and bracelet for high fall risk patients  - Consider moving patient to room near nurses station  Outcome: Progressing  Goal: Maintain or return to baseline ADL function  Description: INTERVENTIONS:  -  Assess patient's ability to carry out ADLs; assess patient's baseline for ADL function and identify physical deficits which impact ability to perform ADLs (bathing, care of mouth/teeth, toileting, grooming, dressing, etc.)  - Assess/evaluate cause of self-care deficits   - Assess range of motion  - Assess patient's mobility; develop plan if impaired  - Assess patient's need for assistive devices and provide as appropriate  - Encourage maximum independence but intervene and supervise when necessary  - Involve family in performance of ADLs  - Assess for home care needs following discharge   - Consider OT consult to assist with ADL evaluation and planning for discharge  - Provide patient education as appropriate  - Monitor functional capacity and physical performance, use of AM PAC & JH-HLM   - Monitor gait, balance and fatigue with ambulation    Outcome: Progressing  Goal: Maintains/Returns to pre admission functional level  Description: INTERVENTIONS:  - Perform AM-PAC 6 Click Basic Mobility/ Daily Activity assessment daily.  - Set and communicate daily mobility goal to care team and patient/family/caregiver.   - Collaborate with rehabilitation services on mobility goals if consulted  -  Perform Range of Motion  times a day.  - Reposition patient every  hours.  - Dangle patient  times a day  - Stand patient  times a day  - Ambulate patient  times a day  - Out of bed to chair  times a day   - Out of bed for meals  times a day  - Out of bed for toileting  - Record patient progress and toleration of activity level   Outcome: Progressing     Problem: DISCHARGE PLANNING  Goal: Discharge to home or other facility with appropriate resources  Description: INTERVENTIONS:  - Identify barriers to discharge w/patient and caregiver  - Arrange for needed discharge resources and transportation as appropriate  - Identify discharge learning needs (meds, wound care, etc.)  - Arrange for interpretive services to assist at discharge as needed  - Refer to Case Management Department for coordinating discharge planning if the patient needs post-hospital services based on physician/advanced practitioner order or complex needs related to functional status, cognitive ability, or social support system  Outcome: Progressing     Problem: Knowledge Deficit  Goal: Patient/family/caregiver demonstrates understanding of disease process, treatment plan, medications, and discharge instructions  Description: Complete learning assessment and assess knowledge base.  Interventions:  - Provide teaching at level of understanding  - Provide teaching via preferred learning methods  Outcome: Progressing     Problem: Prexisting or High Potential for Compromised Skin Integrity  Goal: Skin integrity is maintained or improved  Description: INTERVENTIONS:  - Identify patients at risk for skin breakdown  - Assess and monitor skin integrity including under and around medical devices   - Assess and monitor nutrition and hydration status  - Monitor labs  - Assess for incontinence   - Turn and reposition patient  - Assist with mobility/ambulation  - Relieve pressure over mariano prominences   - Avoid friction and shearing  - Provide appropriate hygiene  as needed including keeping skin clean and dry  - Evaluate need for skin moisturizer/barrier cream  - Collaborate with interdisciplinary team  - Patient/family teaching  - Consider wound care consult    Assess:  - Review Royce scale daily  - Clean and moisturize skin every   - Inspect skin when repositioning, toileting, and assisting with ADLS  - Assess under medical devices such as  every   - Assess extremities for adequate circulation and sensation     Bed Management:  - Have minimal linens on bed & keep smooth, unwrinkled  - Change linens as needed when moist or perspiring  - Avoid sitting or lying in one position for more than  hours while in bed?Keep HOB at degrees   - Toileting:  - Offer bedside commode  - Assess for incontinence every   - Use incontinent care products after each incontinent episode such as     Activity:  - Mobilize patient  times a day  - Encourage activity and walks on unit  - Encourage or provide ROM exercises   - Turn and reposition patient every  Hours  - Use appropriate equipment to lift or move patient in bed  - Instruct/ Assist with weight shifting every  when out of bed in chair  - Consider limitation of chair time  hour intervals    Skin Care:  - Avoid use of baby powder, tape, friction and shearing, hot water or constrictive clothing  - Relieve pressure over bony prominences using   - Do not massage red bony areas    Next Steps:  - Teach patient strategies to minimize risks such as   - Consider consults to  interdisciplinary teams such as   Outcome: Progressing     Problem: MUSCULOSKELETAL - ADULT  Goal: Maintain or return mobility to safest level of function  Description: INTERVENTIONS:  - Assess patient's ability to carry out ADLs; assess patient's baseline for ADL function and identify physical deficits which impact ability to perform ADLs (bathing, care of mouth/teeth, toileting, grooming, dressing, etc.)  - Assess/evaluate cause of self-care deficits   - Assess range of  motion  - Assess patient's mobility  - Assess patient's need for assistive devices and provide as appropriate  - Encourage maximum independence but intervene and supervise when necessary  - Involve family in performance of ADLs  - Assess for home care needs following discharge   - Consider OT consult to assist with ADL evaluation and planning for discharge  - Provide patient education as appropriate  Outcome: Progressing  Goal: Maintain proper alignment of affected body part  Description: INTERVENTIONS:  - Support, maintain and protect limb and body alignment  - Provide patient/ family with appropriate education  Outcome: Progressing

## 2025-05-26 NOTE — ASSESSMENT & PLAN NOTE
Presented with flank pain after fall early this morning  Creatinine 2.05 from baseline 1.04-creatinine is slowly improving, today creatinine is 1.63  Possible secondary to HRS versus prerenal with poor oral intake  Torsemide and spironolactone on hold  Trend creatinine  Appreciate nephrology consultation  CT chest abdomen pelvis no evidence of obstructive disease

## 2025-05-26 NOTE — ASSESSMENT & PLAN NOTE
BMI 44  Morbid obesity complicating all aspects of healthcare  Requires intensive lifestyle modification

## 2025-05-26 NOTE — ASSESSMENT & PLAN NOTE
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   Treated with Sandostatin every 4 weeks, received on 5/13   Follows with [facility]

## 2025-05-26 NOTE — PLAN OF CARE
Problem: PAIN - ADULT  Goal: Verbalizes/displays adequate comfort level or baseline comfort level  Description: Interventions:  - Encourage patient to monitor pain and request assistance  - Assess pain using appropriate pain scale  - Administer analgesics as ordered based on type and severity of pain and evaluate response  - Implement non-pharmacological measures as appropriate and evaluate response  - Consider cultural and social influences on pain and pain management  - Notify physician/advanced practitioner if interventions unsuccessful or patient reports new pain  - Educate patient/family on pain management process including their role and importance of  reporting pain   - Provide non-pharmacologic/complimentary pain relief interventions  5/26/2025 0023 by Sima Kasper RN  Outcome: Progressing  5/26/2025 0022 by Sima Kasper RN  Outcome: Progressing     Problem: INFECTION - ADULT  Goal: Absence or prevention of progression during hospitalization  Description: INTERVENTIONS:  - Assess and monitor for signs and symptoms of infection  - Monitor lab/diagnostic results  - Monitor all insertion sites, i.e. indwelling lines, tubes, and drains  - Monitor endotracheal if appropriate and nasal secretions for changes in amount and color  - [place] appropriate cooling/warming therapies per order  - Administer medications as ordered  - Instruct and encourage patient and family to use good hand hygiene technique  - Identify and instruct in appropriate isolation precautions for identified infection/condition  5/26/2025 0023 by Sima Kasper RN  Outcome: Progressing  5/26/2025 0022 by Sima Kasper RN  Outcome: Progressing  Goal: Absence of fever/infection during neutropenic period  Description: INTERVENTIONS:  - Monitor WBC  - Perform strict hand hygiene  - Limit to healthy visitors only  - No plants, dried, fresh or silk flowers with ortiz in patient room  5/26/2025 0023 by Sima Kasper RN  Outcome:  Progressing  5/26/2025 0022 by Sima Kasper RN  Outcome: Progressing     Problem: SAFETY ADULT  Goal: Patient will remain free of falls  Description: INTERVENTIONS:  - Educate patient/family on patient safety including physical limitations  - Instruct patient to call for assistance with activity   - Consider consulting OT/PT to assist with strengthening/mobility based on AM PAC & JH-HLM score  - Consult OT/PT to assist with strengthening/mobility   - Keep Call bell within reach  - Keep bed low and locked with side rails adjusted as appropriate  - Keep care items and personal belongings within reach  - Initiate and maintain comfort rounds  - Make Fall Risk Sign visible to staff  - Offer Toileting every   Hours, in advance of need  - Initiate/Maintain  alarm  - Obtain necessary fall risk management equipment:    - Apply yellow socks and bracelet for high fall risk patients  - Consider moving patient to room near nurses station  5/26/2025 0023 by Sima Kasper RN  Outcome: Progressing  5/26/2025 0022 by Sima Kasper RN  Outcome: Progressing  Goal: Maintain or return to baseline ADL function  Description: INTERVENTIONS:  -  Assess patient's ability to carry out ADLs; assess patient's baseline for ADL function and identify physical deficits which impact ability to perform ADLs (bathing, care of mouth/teeth, toileting, grooming, dressing, etc.)  - Assess/evaluate cause of self-care deficits   - Assess range of motion  - Assess patient's mobility; develop plan if impaired  - Assess patient's need for assistive devices and provide as appropriate  - Encourage maximum independence but intervene and supervise when necessary  - Involve family in performance of ADLs  - Assess for home care needs following discharge   - Consider OT consult to assist with ADL evaluation and planning for discharge  - Provide patient education as appropriate  - Monitor functional capacity and physical performance, use of AM PAC & JH-HLM    - Monitor gait, balance and fatigue with ambulation    5/26/2025 0023 by Sima Kasper RN  Outcome: Progressing  5/26/2025 0022 by Sima Kasper RN  Outcome: Progressing  Goal: Maintains/Returns to pre admission functional level  Description: INTERVENTIONS:  - Perform AM-PAC 6 Click Basic Mobility/ Daily Activity assessment daily.  - Set and communicate daily mobility goal to care team and patient/family/caregiver.   - Collaborate with rehabilitation services on mobility goals if consulted  - Perform Range of Motion   times a day.  - Reposition patient every   hours.  - Dangle patient   times a day  - Stand patient   times a day  - Ambulate patient   times a day  - Out of bed to chair   times a day   - Out of bed for meals   times a day  - Out of bed for toileting  - Record patient progress and toleration of activity level   5/26/2025 0023 by Sima Kasper RN  Outcome: Progressing  5/26/2025 0022 by Sima Kasper RN  Outcome: Progressing     Problem: DISCHARGE PLANNING  Goal: Discharge to home or other facility with appropriate resources  Description: INTERVENTIONS:  - Identify barriers to discharge w/patient and caregiver  - Arrange for needed discharge resources and transportation as appropriate  - Identify discharge learning needs (meds, wound care, etc.)  - Arrange for interpretive services to assist at discharge as needed  - Refer to Case Management Department for coordinating discharge planning if the patient needs post-hospital services based on physician/advanced practitioner order or complex needs related to functional status, cognitive ability, or social support system  5/26/2025 0023 by Sima Kasper RN  Outcome: Progressing  5/26/2025 0022 by Sima Kasper RN  Outcome: Progressing     Problem: Knowledge Deficit  Goal: Patient/family/caregiver demonstrates understanding of disease process, treatment plan, medications, and discharge instructions  Description: Complete learning  assessment and assess knowledge base.  Interventions:  - Provide teaching at level of understanding  - Provide teaching via preferred learning methods  5/26/2025 0023 by Sima Kasper RN  Outcome: Progressing  5/26/2025 0022 by Sima Kasper RN  Outcome: Progressing     Problem: Prexisting or High Potential for Compromised Skin Integrity  Goal: Skin integrity is maintained or improved  Description: INTERVENTIONS:  - Identify patients at risk for skin breakdown  - Assess and monitor skin integrity including under and around medical devices   - Assess and monitor nutrition and hydration status  - Monitor labs  - Assess for incontinence   - Turn and reposition patient  - Assist with mobility/ambulation  - Relieve pressure over mariano prominences   - Avoid friction and shearing  - Provide appropriate hygiene as needed including keeping skin clean and dry  - Evaluate need for skin moisturizer/barrier cream  - Collaborate with interdisciplinary team  - Patient/family teaching  - Consider wound care consult    Assess:  - Review Royce scale daily  - Clean and moisturize skin every    - Inspect skin when repositioning, toileting, and assisting with ADLS  - Assess under medical devices such as   every    - Assess extremities for adequate circulation and sensation     Bed Management:  - Have minimal linens on bed & keep smooth, unwrinkled  - Change linens as needed when moist or perspiring  - Avoid sitting or lying in one position for more than   hours while in bed?Keep HOB at   degrees   - Toileting:  - Offer bedside commode  - Assess for incontinence every    - Use incontinent care products after each incontinent episode such as      Activity:  - Mobilize patient   times a day  - Encourage activity and walks on unit  - Encourage or provide ROM exercises   - Turn and reposition patient every   Hours  - Use appropriate equipment to lift or move patient in bed  - Instruct/ Assist with weight shifting every   when out of  bed in chair  - Consider limitation of chair time   hour intervals    Skin Care:  - Avoid use of baby powder, tape, friction and shearing, hot water or constrictive clothing  - Relieve pressure over bony prominences using    - Do not massage red bony areas    Next Steps:  - Teach patient strategies to minimize risks such as    - Consider consults to  interdisciplinary teams such as    5/26/2025 0023 by Sima Kasper RN  Outcome: Progressing  5/26/2025 0022 by Sima Kasper RN  Outcome: Progressing     Problem: MUSCULOSKELETAL - ADULT  Goal: Maintain or return mobility to safest level of function  Description: INTERVENTIONS:  - Assess patient's ability to carry out ADLs; assess patient's baseline for ADL function and identify physical deficits which impact ability to perform ADLs (bathing, care of mouth/teeth, toileting, grooming, dressing, etc.)  - Assess/evaluate cause of self-care deficits   - Assess range of motion  - Assess patient's mobility  - Assess patient's need for assistive devices and provide as appropriate  - Encourage maximum independence but intervene and supervise when necessary  - Involve family in performance of ADLs  - Assess for home care needs following discharge   - Consider OT consult to assist with ADL evaluation and planning for discharge  - Provide patient education as appropriate  Outcome: Progressing  Goal: Maintain proper alignment of affected body part  Description: INTERVENTIONS:  - Support, maintain and protect limb and body alignment  - Provide patient/ family with appropriate education  Outcome: Progressing

## 2025-05-26 NOTE — ASSESSMENT & PLAN NOTE
Fall at home in bathroom at 3 AM on day of admission   Trauma evaluation revealed scalp hematoma appreciate PT/OT/case management prior to discharge  Old rib fractures noted on trauma evaluation

## 2025-05-27 ENCOUNTER — PATIENT OUTREACH (OUTPATIENT)
Dept: CASE MANAGEMENT | Facility: OTHER | Age: 55
End: 2025-05-27

## 2025-05-27 PROBLEM — N18.31 STAGE 3A CHRONIC KIDNEY DISEASE (HCC): Status: ACTIVE | Noted: 2025-05-27

## 2025-05-27 LAB
ALBUMIN SERPL BCG-MCNC: 3.2 G/DL (ref 3.5–5)
ALP SERPL-CCNC: 180 U/L (ref 34–104)
ALT SERPL W P-5'-P-CCNC: 19 U/L (ref 7–52)
ANION GAP SERPL CALCULATED.3IONS-SCNC: 6 MMOL/L (ref 4–13)
AST SERPL W P-5'-P-CCNC: 54 U/L (ref 13–39)
BASOPHILS # BLD AUTO: 0.01 THOUSANDS/ÂΜL (ref 0–0.1)
BASOPHILS NFR BLD AUTO: 1 % (ref 0–1)
BILIRUB SERPL-MCNC: 1.04 MG/DL (ref 0.2–1)
BUN SERPL-MCNC: 12 MG/DL (ref 5–25)
CALCIUM ALBUM COR SERPL-MCNC: 9 MG/DL (ref 8.3–10.1)
CALCIUM SERPL-MCNC: 8.4 MG/DL (ref 8.4–10.2)
CHLORIDE SERPL-SCNC: 104 MMOL/L (ref 96–108)
CO2 SERPL-SCNC: 27 MMOL/L (ref 21–32)
CREAT SERPL-MCNC: 0.94 MG/DL (ref 0.6–1.3)
EOSINOPHIL # BLD AUTO: 0.09 THOUSAND/ÂΜL (ref 0–0.61)
EOSINOPHIL NFR BLD AUTO: 6 % (ref 0–6)
ERYTHROCYTE [DISTWIDTH] IN BLOOD BY AUTOMATED COUNT: 18 % (ref 11.6–15.1)
GFR SERPL CREATININE-BSD FRML MDRD: 68 ML/MIN/1.73SQ M
GLUCOSE SERPL-MCNC: 150 MG/DL (ref 65–140)
HCT VFR BLD AUTO: 27 % (ref 34.8–46.1)
HGB BLD-MCNC: 8.2 G/DL (ref 11.5–15.4)
IMM GRANULOCYTES # BLD AUTO: 0 THOUSAND/UL (ref 0–0.2)
IMM GRANULOCYTES NFR BLD AUTO: 0 % (ref 0–2)
INR PPP: 1.35 (ref 0.85–1.19)
LYMPHOCYTES # BLD AUTO: 0.49 THOUSANDS/ÂΜL (ref 0.6–4.47)
LYMPHOCYTES NFR BLD AUTO: 35 % (ref 14–44)
MCH RBC QN AUTO: 28.7 PG (ref 26.8–34.3)
MCHC RBC AUTO-ENTMCNC: 30.4 G/DL (ref 31.4–37.4)
MCV RBC AUTO: 94 FL (ref 82–98)
MONOCYTES # BLD AUTO: 0.15 THOUSAND/ÂΜL (ref 0.17–1.22)
MONOCYTES NFR BLD AUTO: 11 % (ref 4–12)
NEUTROPHILS # BLD AUTO: 0.67 THOUSANDS/ÂΜL (ref 1.85–7.62)
NEUTS SEG NFR BLD AUTO: 47 % (ref 43–75)
NRBC BLD AUTO-RTO: 0 /100 WBCS
PLATELET # BLD AUTO: 49 THOUSANDS/UL (ref 149–390)
PMV BLD AUTO: 10.5 FL (ref 8.9–12.7)
POTASSIUM SERPL-SCNC: 4.3 MMOL/L (ref 3.5–5.3)
PROT SERPL-MCNC: 7 G/DL (ref 6.4–8.4)
PROTHROMBIN TIME: 17 SECONDS (ref 12.3–15)
RBC # BLD AUTO: 2.86 MILLION/UL (ref 3.81–5.12)
SODIUM SERPL-SCNC: 137 MMOL/L (ref 135–147)
WBC # BLD AUTO: 1.41 THOUSAND/UL (ref 4.31–10.16)

## 2025-05-27 PROCEDURE — 99232 SBSQ HOSP IP/OBS MODERATE 35: CPT | Performed by: FAMILY MEDICINE

## 2025-05-27 PROCEDURE — 99232 SBSQ HOSP IP/OBS MODERATE 35: CPT | Performed by: INTERNAL MEDICINE

## 2025-05-27 PROCEDURE — 97163 PT EVAL HIGH COMPLEX 45 MIN: CPT

## 2025-05-27 PROCEDURE — 85610 PROTHROMBIN TIME: CPT | Performed by: FAMILY MEDICINE

## 2025-05-27 PROCEDURE — 85025 COMPLETE CBC W/AUTO DIFF WBC: CPT | Performed by: FAMILY MEDICINE

## 2025-05-27 PROCEDURE — 80053 COMPREHEN METABOLIC PANEL: CPT | Performed by: FAMILY MEDICINE

## 2025-05-27 PROCEDURE — 97167 OT EVAL HIGH COMPLEX 60 MIN: CPT

## 2025-05-27 RX ORDER — TORSEMIDE 20 MG/1
20 TABLET ORAL DAILY
Status: DISCONTINUED | OUTPATIENT
Start: 2025-05-27 | End: 2025-05-28 | Stop reason: HOSPADM

## 2025-05-27 RX ORDER — SPIRONOLACTONE 25 MG/1
25 TABLET ORAL DAILY
Status: DISCONTINUED | OUTPATIENT
Start: 2025-05-27 | End: 2025-05-28 | Stop reason: HOSPADM

## 2025-05-27 RX ORDER — POLYETHYLENE GLYCOL 3350 17 G/17G
17 POWDER, FOR SOLUTION ORAL DAILY PRN
Status: DISCONTINUED | OUTPATIENT
Start: 2025-05-27 | End: 2025-05-28 | Stop reason: HOSPADM

## 2025-05-27 RX ADMIN — RIFAXIMIN 550 MG: 550 TABLET ORAL at 22:37

## 2025-05-27 RX ADMIN — LACTULOSE 30 G: 20 SOLUTION ORAL at 22:37

## 2025-05-27 RX ADMIN — OXYCODONE HYDROCHLORIDE 5 MG: 5 TABLET ORAL at 13:39

## 2025-05-27 RX ADMIN — AMITRIPTYLINE HYDROCHLORIDE 50 MG: 25 TABLET, FILM COATED ORAL at 22:37

## 2025-05-27 RX ADMIN — SPIRONOLACTONE 25 MG: 25 TABLET ORAL at 16:01

## 2025-05-27 RX ADMIN — CYCLOBENZAPRINE 10 MG: 10 TABLET, FILM COATED ORAL at 09:41

## 2025-05-27 RX ADMIN — LACTULOSE 30 G: 20 SOLUTION ORAL at 09:41

## 2025-05-27 RX ADMIN — LACTULOSE 30 G: 20 SOLUTION ORAL at 13:39

## 2025-05-27 RX ADMIN — RIFAXIMIN 550 MG: 550 TABLET ORAL at 09:41

## 2025-05-27 RX ADMIN — LACTULOSE 30 G: 20 SOLUTION ORAL at 18:43

## 2025-05-27 RX ADMIN — OXYCODONE HYDROCHLORIDE 5 MG: 5 TABLET ORAL at 22:40

## 2025-05-27 RX ADMIN — OXYCODONE HYDROCHLORIDE 5 MG: 5 TABLET ORAL at 05:26

## 2025-05-27 RX ADMIN — PANTOPRAZOLE SODIUM 40 MG: 40 TABLET, DELAYED RELEASE ORAL at 05:20

## 2025-05-27 RX ADMIN — FOLIC ACID 1 MG: 1 TABLET ORAL at 09:41

## 2025-05-27 RX ADMIN — TORSEMIDE 20 MG: 20 TABLET ORAL at 16:01

## 2025-05-27 NOTE — PROGRESS NOTES
NEPHROLOGY HOSPITAL PROGRESS NOTE   Bailey Olsen 54 y.o. female MRN: [id number]  Unit/Bed#: -01 Encounter: [id number]    Assessment & Plan  KISHORE (acute kidney injury) (HCC)  This is likely secondary to volume depletion in nature.  Patient's kidney function has improved to 0.9 which is about her baseline just with IV fluids.  Hold IV fluids today see how patient does with eating and drinking check orthostatic vital signs.  With regards to diuretic management agree with decreasing her torsemide dose to once daily and only take twice daily if she gains 2 pounds in 2 consecutive days i.e. 4 pounds in 2 days.  She does have a scale at home we talked about this morning how the use of the scale weighing daily in the morning and recording weights can be a good check and balance to avoid volume depletion.  Again I spoke with the patient as well as with her sister on the phone.  Again checking orthostatic vital signs today.  Liver cirrhosis secondary to HYATT (nonalcoholic steatohepatitis) (HCC)  Maintaining Aldactone to help with ascites as well as lactulose and rifaximin as per primary team.  History of malignant carcinoid tumor of small intestine    Pancytopenia (HCC)  Continue to monitor.  Hemoglobin is 8.2.  Management per primary team.  Morbid obesity with BMI of 40.0-44.9, adult (HCC)  This affects all aspects of care.  Ecchymosis  This is status post fall management per primary team.  Stage 3a chronic kidney disease (HCC)  Lab Results   Component Value Date    EGFR 68 05/27/2025    EGFR 35 05/26/2025    EGFR 26 05/25/2025    CREATININE 0.94 05/27/2025    CREATININE 1.63 (H) 05/26/2025    CREATININE 2.05 (H) 05/25/2025   Patient's baseline creatinine is 0.9 again right at her baseline of 1-1.1.    Administrative Statements   VIRTUAL CARE DOCUMENTATION:     1. This service was provided via Telemedicine using [employer]     2. Parties in the room with patient during teleconsult -patient's sister was on the phone  during visit    3. Confidentiality My office door was closed     4. Participants No one else was in the room    5. Patient acknowledged consent and understanding of privacy and security of the  Telemedicine consult. I informed the patient that I have reviewed their record in Epic and presented the opportunity for them to ask any questions regarding the visit today.  The patient agreed to participate.    6. I have spent a total time of about 25 minutes in caring for this patient on the day of the visit/encounter including patient visit, counseling during the visit and speaking with the patient and her sister, modification of this and plan, placement of orders, review of patient record, brief communication with the patient's nurse via [facility] secure chat.  Not including the time spent for establishing the audio/video connection.        SUBJECTIVE / 24H INTERVAL HISTORY:  Ms. Olsen is seen in follow-up for acute kidney injury in the setting of cirrhosis.  I had the opportunity speak with the patient this morning as well as her sister who was on the phone with us during the visit.  The patient states she still has pain and discomfort in her lower back.  She denies any chest pressure or shortness of breath.  Chart notes reviewed.    Regarding hemodynamics, systolic blood pressure has been anywhere from 117-130s.  She does note some mild dizziness when changing positions and also with standing at home.  She knew what her kidney function was this morning at 0.9 which improved with volume replacement holding diuretics.    OBJECTIVE:  Current Weight: Weight - Scale: 121 kg (266 lb 12.1 oz)  Vitals:    05/26/25 1513 05/26/25 2350 05/27/25 0000 05/27/25 0743   BP: 117/69 138/72 138/72 130/64   BP Location:  Left arm     Pulse: 90 85 87 86   Resp: 18   18   Temp: 97.7 °F (36.5 °C) (!) 97.2 °F (36.2 °C)  (!) 97.3 °F (36.3 °C)   TempSrc:  Temporal     SpO2: 96% 98% 97% 98%   Weight:       Height:           Intake/Output Summary  "(Last 24 hours) at 5/27/2025 0920  Last data filed at 5/27/2025 0500  Gross per 24 hour   Intake 240 ml   Output 1095 ml   Net -855 ml     General: Patient is resting comfortably no acute distress.  HEENT: Normocephalic atraumatic  Neck: Appears to be supple  Pulmonary: No accessory muscle use/no conversational dyspnea  Card: Pulse range is in the 80s to 90s.  Extremities: No significant edema noted.  Neurologic: No focal deficit.  Medications:  Current Medications[1]    Laboratory Results:  Results from last 7 days   Lab Units 05/27/25  0611 05/26/25  0449 05/25/25  1900 05/25/25  0515 05/25/25  0437   WBC Thousand/uL 1.41* 1.97* 2.68* 3.33*  --    HEMOGLOBIN g/dL 8.2* 7.9* 8.4* 8.9*  --    HEMATOCRIT % 27.0* 25.7* 27.0* 29.1*  --    PLATELETS Thousands/uL 49* 81* 52* 60*  --    POTASSIUM mmol/L 4.3 4.4 4.7  --  4.8   CHLORIDE mmol/L 104 106 101  --  101   CO2 mmol/L 27 23 28  --  24   BUN mg/dL 12 22 25  --  21   CREATININE mg/dL 0.94 1.63* 2.05*  --  1.71*   CALCIUM mg/dL 8.4 8.4 8.6  --  8.8   MAGNESIUM mg/dL  --  2.1  --   --   --        Portions of the record may have been created with voice recognition software. Occasional wrong word or \"sound a like\" substitutions may have occurred due to the inherent limitations of voice recognition software. Read the chart carefully and recognize, using context, where substitutions have occurred.If you have any questions, please contact the dictating provider.        [1]   Current Facility-Administered Medications:     amitriptyline (ELAVIL) tablet 50 mg, 50 mg, Oral, HS, Caro S Suzanne, CRNP, 50 mg at 05/26/25 2119    cyclobenzaprine (FLEXERIL) tablet 10 mg, 10 mg, Oral, TID PRN, Caro S Suzanne, CRNP, 10 mg at 05/26/25 1806    folic acid (FOLVITE) tablet 1 mg, 1 mg, Oral, Daily, Caro S Suzanne, CRNP, 1 mg at 05/26/25 0909    lactulose (CHRONULAC) oral solution 30 g, 30 g, Oral, 4x Daily, Caro S Suzanne, CRNP, 30 g at 05/26/25 2120    ondansetron (ZOFRAN) injection 4 mg, 4 mg, " Intravenous, Q6H PRN, Caro S Suzanne, CRNP    oxyCODONE (ROXICODONE) IR tablet 5 mg, 5 mg, Oral, Q6H PRN, Caro S Suzanne, CRNP, 5 mg at 05/27/25 0526    pantoprazole (PROTONIX) EC tablet 40 mg, 40 mg, Oral, Early Morning, Caro S Suzanne, CRNP, 40 mg at 05/27/25 0520    rifaximin (XIFAXAN) tablet 550 mg, 550 mg, Oral, Q12H MIGUEL, Caro S Suzanne, CRNP, 550 mg at 05/26/25 2119

## 2025-05-27 NOTE — PLAN OF CARE
Problem: PHYSICAL THERAPY ADULT  Goal: Performs mobility at highest level of function for planned discharge setting.  See evaluation for individualized goals.  Description: Treatment/Interventions: Functional transfer training, LE strengthening/ROM, Elevations, Therapeutic exercise, Endurance training, Patient/family training, Equipment eval/education, Bed mobility, Gait training, Compensatory technique education, OT, Spoke to nursing          See flowsheet documentation for full assessment, interventions and recommendations.  Note: Prognosis: Fair  Problem List: Decreased strength, Decreased endurance, Impaired balance, Decreased mobility, Obesity, Pain, Decreased skin integrity  Assessment: Pt is a 54 y.o. female seen for PT evaluation s/p admission to [facility] on 5/25/2025 with KISHORE (acute kidney injury) (HCC).  Order placed for PT services.  Upon evaluation: Pt is presenting with impaired functional mobility due to pain, decreased strength, decreased endurance, impaired balance, gait deviations, impaired judgment, fall risk, and impaired skin integrity requiring  mod assistance for bed mobility and min assistance for transfers and ambulation with RW . Pt's clinical presentation is currently unstable/unpredictable given the functional mobility deficits above, especially weakness and decreased activity tolerance, coupled with fall risks as indicated by AM-PAC 6-Clicks: 15/24 as well as hx of falls, polypharmacy, and obesity and combined with medical complications of pain impacting overall mobility status, abnormal H&H, abnormal WBCs, abnormal CBC, abnormal blood sugars, readmission to hospital, and need for input for mobility technique/safety.  Pt's PMHx and comorbidities that may affect physical performance and progress include: obesity and cancer history and/or treatment. Personal factors affecting pt at time of IE include: hx of non-compliance, anxiety, depression, step(s) to enter  environment, past experience, inability to perform IADLs, inability to perform ADLs, inability to ambulate household distances, inability to navigate community distances, and recent fall(s)/fall history. Pt will benefit from continued skilled PT services to address deficits as defined above and to maximize level of functional mobility to facilitate return toward PLOF and improved QOL. From PT/mobility standpoint, recommendation at time of d/c would be Level II (Moderate Resource Intensity pending progress in order to reduce fall risk and maximize pt's functional independence and consistency with mobility in order to facilitate return to PLOF.  Recommend trial with walker next 1-2 sessions.  Barriers to Discharge: Other (Comment)  Barriers to Discharge Comments: pain; limited ambulation distance; high fall risk; # of readmissions; recent falls; spouse works during the day  Rehab Resource Intensity Level, PT: II (Moderate Resource Intensity)    See flowsheet documentation for full assessment.

## 2025-05-27 NOTE — CONSULTS
Consultation - Gastroenterology   Name: Bailey Olsen 54 y.o. female I MRN: [id number]  Unit/Bed#: -01 I Date of Admission: 5/25/2025   Date of Service: 5/27/2025 I Hospital Day: 2     Inpatient consult to gastroenterology  Consult performed by: Angely Tapia PA-C  Consult ordered by: MAGDA Stark      Physician Requesting Evaluation: Idalia Stuart MD   Reason for Evaluation / Principal Problem: decompensated cirrhosis    Assessment & Plan  Liver cirrhosis secondary to HAYTT (nonalcoholic steatohepatitis) (HCC)  MELD-Na 12 (<2% estimated 90 day mortality), Child Wilder class A  Ascites  Diuretics on lasix and aldactone at home - on hold due to KISHORE  Low Na diet, 2g/day  Hepatic encephalopathy  Ammonia 168 on admission  Hx of ?non compliance with lactulose at home   Continue lactulose 30g QID and xifaxan 550mg BID  Titrate lactulose to 3-4 loose to soft BMs daily  AVOID opioids   Esophageal varices  EGD 1/18/25 no EV   HCC screening  CT A/P no mass 12/2/24   Due for repeat imaging 6/2025    She wishes to establish care with us as an outpatient, we will call and arrange  GI will sign off at this time, please reach out with any questions or concerns.     HPI: Bailey Olsen is a 54 y.o. 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     In the ED, she was found to have an KISHORE and elevated ammonia level at 168. She was admitted with an KISHORE. GI has been consulted for management of decompensated cirrhosis.     Patient seen this AM resting comfortably in chair at bedside eating candy and cookies. She reports she is feeling well. She is alert and oriented x3. No abdominal pain. She is taking her lactulose, xifaxan and diuretics at home.     She reports she keeps falling at home. She states she feels  weak and shaky and then falls. Nephrology is adjusting dose of diuretics.     Endoscopic risk assessment:  Anticoagulation use: n  Diabetes medication: n  CVS history: n  Abdominal surgeries: see HPI  Sleep apnea: n  O2 use: n    Past Medical History[1]  Past Surgical History[2]  Social History   Social History     Substance and Sexual Activity   Alcohol Use Never     Social History     Substance and Sexual Activity   Drug Use Never     Tobacco Use History[3]    Family History[4]      Medications Prior to Admission:     amitriptyline (ELAVIL) 50 mg tablet    cyclobenzaprine (FLEXERIL) 10 mg tablet    dapsone 25 mg tablet    lactulose (CHRONULAC) 10 g/15 mL solution    lidocaine (LIDODERM) 5 %    polyethylene glycol (MIRALAX) 17 g packet    rifaximin (XIFAXAN) 550 mg tablet    ursodiol (ACTIGALL) 300 mg capsule    folic acid (FOLVITE) 1 mg tablet    metoprolol tartrate (LOPRESSOR) 25 mg tablet    midodrine (PROAMATINE) 2.5 mg tablet    pantoprazole (PROTONIX) 40 mg tablet    potassium chloride (Klor-Con M20) 20 mEq tablet    spironolactone (ALDACTONE) 25 mg tablet    torsemide (DEMADEX) 20 mg tablet    Current Facility-Administered Medications:     amitriptyline (ELAVIL) tablet 50 mg, HS    cyclobenzaprine (FLEXERIL) tablet 10 mg, TID PRN    folic acid (FOLVITE) tablet 1 mg, Daily    lactulose (CHRONULAC) oral solution 30 g, 4x Daily    ondansetron (ZOFRAN) injection 4 mg, Q6H PRN    oxyCODONE (ROXICODONE) IR tablet 5 mg, Q6H PRN    pantoprazole (PROTONIX) EC tablet 40 mg, Early Morning    rifaximin (XIFAXAN) tablet 550 mg, Q12H MIGUEL  Allergies[5]    Physical Exam  Constitutional:       General: She is not in acute distress.     Appearance: She is not ill-appearing.   HENT:      Head: Normocephalic and atraumatic.      Mouth/Throat:      Mouth: Mucous membranes are moist.     Eyes:      General: No scleral icterus.    Pulmonary:      Effort: Pulmonary effort is normal. No respiratory distress.   Abdominal:       General: Abdomen is flat. There is no distension.      Tenderness: There is no abdominal tenderness. There is no guarding.     Skin:     General: Skin is warm and dry.      Coloration: Skin is not jaundiced.     Neurological:      Mental Status: She is alert and oriented to person, place, and time.       Most Recent Vital Signs:  Vitals:    05/26/25 1513 05/26/25 2350 05/27/25 0000 05/27/25 0743   BP: 117/69 138/72 138/72 130/64   BP Location:  Left arm     Pulse: 90 85 87 86   Resp: 18   18   Temp: 97.7 °F (36.5 °C) (!) 97.2 °F (36.2 °C)  (!) 97.3 °F (36.3 °C)   TempSrc:  Temporal     SpO2: 96% 98% 97% 98%   Weight:       Height:           Intake/Output Summary (Last 24 hours) at 5/27/2025 1023  Last data filed at 5/27/2025 0500  Gross per 24 hour   Intake 240 ml   Output 1095 ml   Net -855 ml       LABS/IMAGING  Lab Results: I have reviewed all relevant lab results during this hospitalization.    Imaging Studies:  I have reviewed all the relevant images during this hospitalizations    Counseling / Coordination of Care  Total time spent today 45 minutes. Greater than 50% of total time was spent with the patient and / or family counseling and / or coordination of care.    Angely Tapia PA-C          [1]   Past Medical History:  Diagnosis Date    Back pain     Carcinoid tumor     neuroendocrine    CHF (congestive heart failure) (HCC)     Cirrhosis of liver (HCC)     Hypertension     Sarcoidosis    [2]   Past Surgical History:  Procedure Laterality Date    CHOLECYSTECTOMY      GASTRIC BYPASS      HERNIA REPAIR      IR KYPHOPLASTY/VERTEBROPLASTY  10/15/2024   [3]   Social History  Tobacco Use   Smoking Status Never   Smokeless Tobacco Never   [4]   Family History  Problem Relation Name Age of Onset    Hypertension Father      Heart attack Father      No Known Problems Sister      No Known Problems Brother      No Known Problems Brother      No Known Problems Brother      No Known Problems Brother      No Known Problems  Brother      No Known Problems Daughter     [5]   Allergies  Allergen Reactions    Zolpidem GI Intolerance, Other (See Comments) and Hives     Other Reaction(s): Other      Blacks out    Blacks out    Tapentadol Sneezing and Other (See Comments)     Zoned out    Alprazolam Other (See Comments)     Other Reaction(s): Other      Feels zoned out    Feels zoned out    Amoxicillin-Pot Clavulanate Hives and Nausea Only     Amoxil fine    Dust Mite Extract Sneezing    Morphine Headache, Nausea Only and Vomiting    Tramadol GI Intolerance    Azelastine Rash    Azelastine Hcl Rash    Molds & Smuts Rash     Other Reaction(s): Unknown

## 2025-05-27 NOTE — ASSESSMENT & PLAN NOTE
Fall at home in bathroom at 3 AM on day of admission   Trauma evaluation revealed scalp hematoma appreciate PT/OT/case management prior to discharge  Old rib fractures noted on trauma evaluation  May need subacute rehab if she agrees

## 2025-05-27 NOTE — PROGRESS NOTES
Patient:    MRN:  [id number]    Sindy Request ID:  [id number]    Level of care reserved:  Skilled Nursing Facility    Partner Reserved:  [facility]  , [place] [address] [phone]    Clinical needs requested:    Geography searched:  20 miles around [id number]    Start of Service:    Request sent:  3:22pm EDT on 5/27/2025 by Gabriella Kinney    Partner reserved:  4:02pm EDT on 5/27/2025 by Gabriella Kinney    Choice list shared:  4:01pm EDT on 5/27/2025 by Gabriella Kinney

## 2025-05-27 NOTE — PLAN OF CARE
Problem: PAIN - ADULT  Goal: Verbalizes/displays adequate comfort level or baseline comfort level  Description: Interventions:  - Encourage patient to monitor pain and request assistance  - Assess pain using appropriate pain scale  - Administer analgesics as ordered based on type and severity of pain and evaluate response  - Implement non-pharmacological measures as appropriate and evaluate response  - Consider cultural and social influences on pain and pain management  - Notify physician/advanced practitioner if interventions unsuccessful or patient reports new pain  - Educate patient/family on pain management process including their role and importance of  reporting pain   - Provide non-pharmacologic/complimentary pain relief interventions  Outcome: Progressing     Problem: INFECTION - ADULT  Goal: Absence or prevention of progression during hospitalization  Description: INTERVENTIONS:  - Assess and monitor for signs and symptoms of infection  - Monitor lab/diagnostic results  - Monitor all insertion sites, i.e. indwelling lines, tubes, and drains  - Monitor endotracheal if appropriate and nasal secretions for changes in amount and color  - [place] appropriate cooling/warming therapies per order  - Administer medications as ordered  - Instruct and encourage patient and family to use good hand hygiene technique  - Identify and instruct in appropriate isolation precautions for identified infection/condition  Outcome: Progressing  Goal: Absence of fever/infection during neutropenic period  Description: INTERVENTIONS:  - Monitor WBC  - Perform strict hand hygiene  - Limit to healthy visitors only  - No plants, dried, fresh or silk flowers with ortiz in patient room  Outcome: Progressing     Problem: SAFETY ADULT  Goal: Patient will remain free of falls  Description: INTERVENTIONS:  - Educate patient/family on patient safety including physical limitations  - Instruct patient to call for assistance with activity   -  Consider consulting OT/PT to assist with strengthening/mobility based on AM PAC & JH-HLM score  - Consult OT/PT to assist with strengthening/mobility   - Keep Call bell within reach  - Keep bed low and locked with side rails adjusted as appropriate  - Keep care items and personal belongings within reach  - Initiate and maintain comfort rounds  - Make Fall Risk Sign visible to staff  - Offer Toileting every 2 Hours, in advance of need  - Initiate/Maintain bed alarm    - Apply yellow socks and bracelet for high fall risk patients  - Consider moving patient to room near nurses station  Outcome: Progressing  Goal: Maintain or return to baseline ADL function  Description: INTERVENTIONS:  -  Assess patient's ability to carry out ADLs; assess patient's baseline for ADL function and identify physical deficits which impact ability to perform ADLs (bathing, care of mouth/teeth, toileting, grooming, dressing, etc.)  - Assess/evaluate cause of self-care deficits   - Assess range of motion  - Assess patient's mobility; develop plan if impaired  - Assess patient's need for assistive devices and provide as appropriate  - Encourage maximum independence but intervene and supervise when necessary  - Involve family in performance of ADLs  - Assess for home care needs following discharge   - Consider OT consult to assist with ADL evaluation and planning for discharge  - Provide patient education as appropriate  - Monitor functional capacity and physical performance, use of AM PAC & JH-HLM   - Monitor gait, balance and fatigue with ambulation    Outcome: Progressing  Goal: Maintains/Returns to pre admission functional level  Description: INTERVENTIONS:  - Perform AM-PAC 6 Click Basic Mobility/ Daily Activity assessment daily.  - Set and communicate daily mobility goal to care team and patient/family/caregiver.   - Collaborate with rehabilitation services on mobility goals if consulted  - Perform Range of Motion 3 times a day.  -  Reposition patient every 2 hours.  - Record patient progress and toleration of activity level   Outcome: Progressing     Problem: DISCHARGE PLANNING  Goal: Discharge to home or other facility with appropriate resources  Description: INTERVENTIONS:  - Identify barriers to discharge w/patient and caregiver  - Arrange for needed discharge resources and transportation as appropriate  - Identify discharge learning needs (meds, wound care, etc.)  - Arrange for interpretive services to assist at discharge as needed  - Refer to Case Management Department for coordinating discharge planning if the patient needs post-hospital services based on physician/advanced practitioner order or complex needs related to functional status, cognitive ability, or social support system  Outcome: Progressing     Problem: Knowledge Deficit  Goal: Patient/family/caregiver demonstrates understanding of disease process, treatment plan, medications, and discharge instructions  Description: Complete learning assessment and assess knowledge base.  Interventions:  - Provide teaching at level of understanding  - Provide teaching via preferred learning methods  Outcome: Progressing     Problem: Prexisting or High Potential for Compromised Skin Integrity  Goal: Skin integrity is maintained or improved  Description: INTERVENTIONS:  - Identify patients at risk for skin breakdown  - Assess and monitor skin integrity including under and around medical devices   - Assess and monitor nutrition and hydration status  - Monitor labs  - Assess for incontinence   - Turn and reposition patient  - Assist with mobility/ambulation  - Relieve pressure over mariano prominences   - Avoid friction and shearing  - Provide appropriate hygiene as needed including keeping skin clean and dry  - Evaluate need for skin moisturizer/barrier cream  - Collaborate with interdisciplinary team  - Patient/family teaching  - Consider wound care consult    Assess:  - Review Royce scale  daily  - Inspect skin when repositioning, toileting, and assisting with ADLS    - Assess extremities for adequate circulation and sensation     Bed Management:  - Have minimal linens on bed & keep smooth, unwrinkled  - Change linens as needed when moist or perspiring    - Toileting:  - Offer bedside commode  - Assess for incontinence every 2 hours    Activity:  - Mobilize patient 3 times a day  - Encourage activity and walks on unit  - Encourage or provide ROM exercises   - Turn and reposition patient every 2 Hours  - Use appropriate equipment to lift or move patient in bed      Skin Care:  - Avoid use of baby powder, tape, friction and shearing, hot water or constrictive clothing  - Do not massage red bony areas      Outcome: Progressing     Problem: MUSCULOSKELETAL - ADULT  Goal: Maintain or return mobility to safest level of function  Description: INTERVENTIONS:  - Assess patient's ability to carry out ADLs; assess patient's baseline for ADL function and identify physical deficits which impact ability to perform ADLs (bathing, care of mouth/teeth, toileting, grooming, dressing, etc.)  - Assess/evaluate cause of self-care deficits   - Assess range of motion  - Assess patient's mobility  - Assess patient's need for assistive devices and provide as appropriate  - Encourage maximum independence but intervene and supervise when necessary  - Involve family in performance of ADLs  - Assess for home care needs following discharge   - Consider OT consult to assist with ADL evaluation and planning for discharge  - Provide patient education as appropriate  Outcome: Progressing  Goal: Maintain proper alignment of affected body part  Description: INTERVENTIONS:  - Support, maintain and protect limb and body alignment  - Provide patient/ family with appropriate education  Outcome: Progressing

## 2025-05-27 NOTE — ASSESSMENT & PLAN NOTE
MELD-Na 12 (<2% estimated 90 day mortality), Child Wilder class A  Ascites  Diuretics on lasix and aldactone at home - on hold due to KISHORE  Low Na diet, 2g/day  Hepatic encephalopathy  Ammonia 168 on admission  Hx of ?non compliance with lactulose at home   Continue lactulose 30g QID and xifaxan 550mg BID  Titrate lactulose to 3-4 loose to soft BMs daily  AVOID opioids   Esophageal varices  EGD 1/18/25 no EV   HCC screening  CT A/P no mass 12/2/24   Due for repeat imaging 6/2025    She wishes to establish care with us as an outpatient, we will call and arrange  GI will sign off at this time, please reach out with any questions or concerns.

## 2025-05-27 NOTE — ASSESSMENT & PLAN NOTE
This is likely secondary to volume depletion in nature.  Patient's kidney function has improved to 0.9 which is about her baseline just with IV fluids.  Hold IV fluids today see how patient does with eating and drinking check orthostatic vital signs.  With regards to diuretic management agree with decreasing her torsemide dose to once daily and only take twice daily if she gains 2 pounds in 2 consecutive days i.e. 4 pounds in 2 days.  She does have a scale at home we talked about this morning how the use of the scale weighing daily in the morning and recording weights can be a good check and balance to avoid volume depletion.  Again I spoke with the patient as well as with her sister on the phone.  Again checking orthostatic vital signs today.

## 2025-05-27 NOTE — ASSESSMENT & PLAN NOTE
Maintaining Aldactone to help with ascites as well as lactulose and rifaximin as per primary team.

## 2025-05-27 NOTE — OCCUPATIONAL THERAPY NOTE
Occupational Therapy Evaluation     Patient Name: Bailey Olsen  Today's Date: 5/27/2025  Problem List  Principal Problem:    KISHORE (acute kidney injury) (HCC)  Active Problems:    Liver cirrhosis secondary to HYATT (nonalcoholic steatohepatitis) (HCC)    History of malignant carcinoid tumor of small intestine    Pancytopenia (HCC)    Morbid obesity with BMI of 40.0-44.9, adult (HCC)    Falls    Scalp hematoma    Contusion of back    Ecchymosis    Stage 3a chronic kidney disease (HCC)    Past Medical History  Past Medical History[1]  Past Surgical History  Past Surgical History[2]        05/27/25 1002   OT Last Visit   OT Visit Date 05/27/25   Note Type   Note type Evaluation   Pain Assessment   Pain Assessment Tool 0-10   Pain Score 7   Pain Location/Orientation Location: Head   Multiple Pain Sites Yes   Pain 2   Pain Score 2 7   Pain Location/Orientation 2 Location: Back   Home Living   Type of Home House  (1 FABRICIO (reports this has been challenging and her  has been helping her get in and out of the home to prevent a fall))   Home Layout Two level;Performs ADLs on one level;Able to live on main level with bedroom/bathroom   Bathroom Shower/Tub Tub/shower unit   Bathroom Toilet Raised   Bathroom Equipment Grab bars in shower;Commode;Grab bars around toilet  (Pt planning to obtain a tub transfer bench)   Bathroom Accessibility Accessible via walker   Home Equipment Walker;Cane   Prior Function   Level of [place] Independent with functional mobility;Needs assistance with IADLS;Needs assistance with ADLs  (Spouse assists for FABRICIO in and out of the house and with some LB ADLs)   Lives With Spouse;Daughter   Receives Help From Family   IADLs Family/Friend/Other provides transportation;Independent with meal prep;Independent with medication management   Falls in the last 6 months 5 to 10  (6)   ADL   Where Assessed Edge of bed   UB Dressing Assistance 4  Minimal Assistance   LB Dressing Assistance 2  Maximal  Assistance   LB Dressing Deficit   (donning B socks)   Bed Mobility   Rolling R 4  Minimal assistance   Additional items Assist x 1;Increased time required;Verbal cues   Supine to Sit 3  Moderate assistance   Additional items Assist x 1;Increased time required;Verbal cues   Transfers   Sit to Stand 4  Minimal assistance   Additional items Assist x 1;Increased time required;Verbal cues   Stand to Sit 4  Minimal assistance   Additional items Assist x 1;Increased time required;Verbal cues   Stand pivot 3  Moderate assistance   Additional items Assist x 1;Increased time required;Verbal cues   Additional Comments Pt walked short household distance with Mod A and ww. Pt had increased anxiety during mobility due to fear of falling, and showed signs of pain. Pt reported having pain in her R flank during mobility (as well as at rest).   Functional Mobility   Functional Mobility 3  Moderate assistance   Additional Comments Pt walked short household distance with Mod A and ww. Pt had increased anxiety during mobility due to fear of falling, and showed signs of pain. Pt reported having pain in her R flank during mobility (as well as at rest).   Balance   Static Sitting Good   Dynamic Sitting Fair +   Static Standing Poor +   Dynamic Standing Poor   Activity Tolerance   Activity Tolerance Patient limited by fatigue;Patient limited by pain   Medical Staff Made Aware PT Otoniel   Nurse Made Aware KAMALA Unger   RUE Assessment   RUE Assessment WFL  (strength grossly 4-/5; ROM WFL)   LUE Assessment   LUE Assessment WFL  (strength grossly 4-/5; ROM WFL)   Hand Function   Gross Motor Coordination Impaired  (slow movement)   Fine Motor Coordination Functional   Hand Function Comments R Hand Dominant   Psychosocial   Psychosocial (WDL) X   Patient Behaviors/Mood Anxious   Cognition   Overall Cognitive Status WFL   Arousal/Participation Alert;Responsive;Cooperative   Attention Within functional limits   Orientation Level Oriented X4    Memory Within functional limits   Following Commands Follows one step commands with increased time or repetition   Assessment   Limitation Decreased ADL status;Decreased UE strength;Decreased UE ROM;Decreased Safe judgement during ADL;Decreased endurance;Decreased self-care trans;Decreased high-level ADLs   Prognosis Good   Assessment Pt is a 54 y.o. female, admitted to [facility] 5/25/2025 d/t experiencing a fall with a head strike. Dx: acute kidney injury. Pt with PMHx impacting their performance during ADL tasks, including: HYATT with advanced liver cirrhosis. Prior to admission to the hospital Pt was performing ADLs without physical assistance. IADLs with physical assistance. Functional transfers/ambulation with physical assistance. Cognitive status was PTA was WFL. OT order placed to assess Pt's ADLs, cognitive status, and performance during functional tasks in order to maximize safety and independence while making most appropriate d/c recommendations. Pt's clinical presentation is currently unstable/unpredictable given new onset deficits that effect Pt's occupational performance and ability to safely return to OF including decrease activity tolerance, decrease standing balance, decrease sitting balance, decrease performance during ADL tasks, decrease safety awareness , increased pain, decrease generalized strength, decrease activity engagement, decrease performance during functional transfers, decrease GM control, frequent falls, high fall risk, and limited insight to deficits combined with medical complications of pain impacting overall mobility status, abnormal H&H, abnormal CBC, abnormal blood sugars, edema/swelling, wounds, and need for input for mobility technique/safety. Personal factors affecting Pt at time of initial evaluation include: step(s) to enter environment, past experience, behavioral pattern, inability to perform IADLs, inability to perform ADLs, inability to ambulate household distances,  inability to navigate community distances, limited insight into impairments, decreased initiation and engagement, and recent fall(s)/fall history. Pt will benefit from continued skilled OT services to address deficits as defined above and to maximize level independence/participation during ADLs and functional tasks to facilitate return toward PLOF and improved quality of life. From an occupational therapy standpoint, recommendation at time of d/c would be Level II: Moderate Resource Intensity.   Plan   Treatment Interventions ADL retraining;Functional transfer training;UE strengthening/ROM;Endurance training;Patient/family training;Equipment evaluation/education;Compensatory technique education;Energy conservation;Activityengagement   Goal Expiration Date 06/10/25   OT Treatment Day 1   OT Frequency 2-3x/wk   Discharge Recommendation   Rehab Resource Intensity Level, OT II (Moderate Resource Intensity)   AM-PAC Daily Activity Inpatient   Lower Body Dressing 2   Bathing 2   Toileting 2   Upper Body Dressing 3   Grooming 3   Eating 3   Daily Activity Raw Score 15   Daily Activity Standardized Score (Calc for Raw Score >=11) 34.69   AM-PAC Applied Cognition Inpatient   Following a Speech/Presentation 3   Understanding Ordinary Conversation 4   Taking Medications 3   Remembering Where Things Are Placed or Put Away 3   Remembering List of 4-5 Errands 3   Taking Care of Complicated Tasks 2   Applied Cognition Raw Score 18   Applied Cognition Standardized Score 38.07   End of Consult   Education Provided Yes   Patient Position at End of Consult Bedside chair;Bed/Chair alarm activated;All needs within reach   Nurse Communication Nurse aware of consult        05/27/25 1031 05/27/25 1036 05/27/25 1040   Vitals   Pulse 103 (!) 109 99   Blood Pressure 130/83  --  163/92   MAP (mmHg) 99  --  116   Oxygen Therapy   SpO2 97 % 97 % 98 %   O2 Device None (Room air) None (Room air) None (Room air)     The patient's raw score on the  AM-PAC Daily Activity Inpatient Short Form is 15. A raw score of less than 19 suggests the patient may benefit from discharge to post-acute rehabilitation services. Please refer to the recommendation of the Occupational Therapist for safe discharge planning.    Pt goals to be met by 6/10/2025    Pt will demonstrate ability to complete grooming/hygiene tasks @ independent after set-up.  Pt will demonstrate ability to complete supine<>sit @ independent in order to increase safety and independence during ADL tasks.  Pt will demonstrate ability to complete UB ADLs including washing/dressing @ independent in order to increase performance and participation during meaningful tasks  Pt will demonstrate ability to complete LB dressing @ modified independent in order to increase safety and independence during meaningful tasks.   Pt will demonstrate ability to iain/doff socks/shoes while sitting @ modified independent in order to increase safety and independence during meaningful tasks.   Pt will demonstrate ability to complete toileting tasks including CM and pericare @ modified independent in order to increase safety and independence during meaningful tasks.  Pt will demonstrate ability to complete EOB, chair, toilet/commode transfers @ modified independent in order to increase performance and participation during functional tasks.  Pt will demonstrate ability to stand for 15 minutes while maintaining fair+ balance with use of a rolling walker for UB support PRN.  Pt will demonstrate ability to tolerate 30-35 minute OT session with no vc'ing for deep breathing or use of energy conservation techniques in order to increase activity tolerance during functional tasks.   Pt will demonstrate Good carryover of use of energy conservation/compensatory strategies during ADLs and functional tasks in order to increase safety and reduce risk for falls.   Pt will demonstrate Good attention and participation in continued evaluation of  functional ambulation house hold distances in order to assist with safe d/c planning.  Pt will attend to continued cognitive assessments 100% of the time in order to provide most appropriate d/c recommendations.   Pt will follow 100% simple 2-step commands and be A&O x4 consistently with environmental cues to increase participation in functional activities.   Pt will identify 3 areas of interest/hobbies and 1 intervention on how to incorporate into daily life in order to increase interaction with environment and peers as well as increase participation in meaningful tasks.   Pt will demonstrate 100% carryover of BUE HEP in order to increase BUE MS and increase performance during functional tasks upon d/c home.    Cristian Ramachandran, OTR/L           [1]   Past Medical History:  Diagnosis Date    Back pain     Carcinoid tumor     neuroendocrine    CHF (congestive heart failure) (HCC)     Cirrhosis of liver (HCC)     Hypertension     Sarcoidosis    [2]   Past Surgical History:  Procedure Laterality Date    CHOLECYSTECTOMY      GASTRIC BYPASS      HERNIA REPAIR      IR KYPHOPLASTY/VERTEBROPLASTY  10/15/2024

## 2025-05-27 NOTE — CASE MANAGEMENT
Case Management Progress Note    Patient name Bailey Olsen  Location /-01 MRN [id number]  : 1970 Date 2025       LOS (days): 2  Geometric Mean LOS (GMLOS) (days):   Days to GMLOS:        OBJECTIVE:        Current admission status: Inpatient  Preferred Pharmacy:   [facility] [place], [place] - [address] [facility]  [address] [facility] [address]  Phone: [phone] Fax: [phone]    [facility]pharm[facility] #[id number] - [place], [place] - [facility] [address]  Phone: [phone] Fax: [phone]    Primary Care Provider: Nayely Brown DO    Primary Insurance: MEDICARE  Secondary Insurance: BLUE CROSS    PROGRESS NOTE:    Discussed in rounds, identified patient declined [facility] upon dc from the hospital, earlier in May.  Per provider patient is non compliant with medications/ leonardo lactulose.  Referral to High Utilizer team was made.    Therapy evaluation is pending

## 2025-05-27 NOTE — PLAN OF CARE
Problem: OCCUPATIONAL THERAPY ADULT  Goal: Performs self-care activities at highest level of function for planned discharge setting.  See evaluation for individualized goals.  Description: Treatment Interventions: ADL retraining, Functional transfer training, UE strengthening/ROM, Endurance training, Patient/family training, Equipment evaluation/education, Compensatory technique education, Energy conservation, Activityengagement          See flowsheet documentation for full assessment, interventions and recommendations.   Note: Limitation: Decreased ADL status, Decreased UE strength, Decreased UE ROM, Decreased Safe judgement during ADL, Decreased endurance, Decreased self-care trans, Decreased high-level ADLs  Prognosis: Good  Assessment: Pt is a 54 y.o. female, admitted to [facility] 5/25/2025 d/t experiencing a fall with a head strike. Dx: acute kidney injury. Pt with PMHx impacting their performance during ADL tasks, including: HYATT with advanced liver cirrhosis. Prior to admission to the hospital Pt was performing ADLs without physical assistance. IADLs with physical assistance. Functional transfers/ambulation with physical assistance. Cognitive status was PTA was WFL. OT order placed to assess Pt's ADLs, cognitive status, and performance during functional tasks in order to maximize safety and independence while making most appropriate d/c recommendations. Pt's clinical presentation is currently unstable/unpredictable given new onset deficits that effect Pt's occupational performance and ability to safely return to OF including decrease activity tolerance, decrease standing balance, decrease sitting balance, decrease performance during ADL tasks, decrease safety awareness , increased pain, decrease generalized strength, decrease activity engagement, decrease performance during functional transfers, decrease GM control, frequent falls, high fall risk, and limited insight to deficits combined with medical  complications of pain impacting overall mobility status, abnormal H&H, abnormal CBC, abnormal blood sugars, edema/swelling, wounds, and need for input for mobility technique/safety. Personal factors affecting Pt at time of initial evaluation include: step(s) to enter environment, past experience, behavioral pattern, inability to perform IADLs, inability to perform ADLs, inability to ambulate household distances, inability to navigate community distances, limited insight into impairments, decreased initiation and engagement, and recent fall(s)/fall history. Pt will benefit from continued skilled OT services to address deficits as defined above and to maximize level independence/participation during ADLs and functional tasks to facilitate return toward PLOF and improved quality of life. From an occupational therapy standpoint, recommendation at time of d/c would be Level II: Moderate Resource Intensity.     Rehab Resource Intensity Level, OT: II (Moderate Resource Intensity)

## 2025-05-27 NOTE — CASE MANAGEMENT
Case Management Discharge Planning Note    Patient name Bailey Olsen  Location /-01 MRN [id number]  : 1970 Date 2025       Current Admission Date: 2025  Current Admission Diagnosis:KISHORE (acute kidney injury) (HCC)   Patient Active Problem List    Diagnosis Date Noted    Stage 3a chronic kidney disease (HCC) 2025    Scalp hematoma 2025    Contusion of back 2025    Ecchymosis 2025    Falls 2025    Chest pain 2025    Abdominal pain 2025    Acute kidney injury (HCC) 2025    Gram-negative bacteremia 2025    Acute respiratory failure (HCC) 2025    Carcinoid tumor of pancreas 2025    Hypomagnesemia 2025    Upper GI bleed 2025    Acute anemia 2025    Rectal bleeding 2025    Acute blood loss anemia 2025    Chronic, continuous use of opioids 2025    Acute metabolic encephalopathy 2025    SIRS (systemic inflammatory response syndrome) (HCC) 2025    Sacral fracture, closed (HCC) 11/15/2024    KISHORE (acute kidney injury) (HCC) 10/04/2024    Morbid obesity with BMI of 40.0-44.9, adult (HCC) 10/04/2024    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) 2024    Fall from standing 2024    Closed compression fracture of L1 lumbar vertebra, sequela 2024    Closed stable burst fracture of first lumbar vertebra with routine healing 2024    Groin hematoma 2024    Ambulatory dysfunction 2024    Intractable back pain 2024    Hyperammonemia (HCC) 2024    Hepatic encephalopathy (HCC) 2024    Fall 2024    Liver cirrhosis secondary to HYATT (nonalcoholic steatohepatitis) (HCC) 2024    History of malignant carcinoid tumor of small intestine 2024    Class 3 severe obesity due to excess calories in adult 2024    Primary hypertension 2024    Hypokalemia 2024    Fibromyalgia 2024    Iron  deficiency anemia 05/26/2024    Pancytopenia (HCC) 05/26/2024    Sarcoidosis 05/26/2024    Bilateral pulmonary infiltrates 05/26/2024      LOS (days): 2  Geometric Mean LOS (GMLOS) (days):   Days to GMLOS:     OBJECTIVE:  Risk of Unplanned Readmission Score: 69.09         Current admission status: Inpatient   Preferred Pharmacy:   [facility] [address] [place], [place] - [address] [facility]  [address] [address]  Phone: [phone] Fax: [phone]    [id number] [place] [place] - [address]  [address] [address]  Phone: [phone] Fax: [phone]    Primary Care Provider: Nayely Brown DO    Primary Insurance: MEDICARE  Secondary Insurance: BLUE CROSS    DISCHARGE DETAILS:    Discharge planning discussed with:: patient  Freedom of Choice: Yes  Comments - Freedom of Choice: care team recommendations for level 2 discussed, pt is agreeable to [facility], blanket referral made in aidin. pts 1st choice is [facility]. Referrals pending.     1602- pt choice list discussed with pt, [facility] is able to accept pt, reserved in aidin.      1622- CM discussed DM IMM with pt, pt indicated she does not have Medicare, she is covered under her spouses [employer]. CM sent email to inpatient  to verify active insurance.        Were Treatment Team discharge recommendations reviewed with patient/caregiver?: Yes  Did patient/caregiver verbalize understanding of patient care needs?: Yes  Were patient/caregiver advised of the risks associated with not following Treatment Team discharge recommendations?: Yes                   Other Referral/Resources/Interventions Provided:  Interventions: Short Term Rehab         Treatment Team Recommendation: Short Term Rehab  Discharge Destination Plan:: Short Term Rehab

## 2025-05-27 NOTE — ASSESSMENT & PLAN NOTE
Lab Results   Component Value Date    EGFR 68 05/27/2025    EGFR 35 05/26/2025    EGFR 26 05/25/2025    CREATININE 0.94 05/27/2025    CREATININE 1.63 (H) 05/26/2025    CREATININE 2.05 (H) 05/25/2025   Patient's baseline creatinine is 0.9 again right at her baseline of 1-1.1.    Administrative Statements   VIRTUAL CARE DOCUMENTATION:     1. This service was provided via Telemedicine using [email or web address] Kit     2. Parties in the room with patient during teleconsult -patient's sister was on the phone during visit    3. Confidentiality My office door was closed     4. Participants No one else was in the room    5. Patient acknowledged consent and understanding of privacy and security of the  Telemedicine consult. I informed the patient that I have reviewed their record in Epic and presented the opportunity for them to ask any questions regarding the visit today.  The patient agreed to participate.    6. I have spent a total time of about 25 minutes in caring for this patient on the day of the visit/encounter including patient visit, counseling during the visit and speaking with the patient and her sister, modification of this and plan, placement of orders, review of patient record, brief communication with the patient's nurse via [employer] secure chat.  Not including the time spent for establishing the audio/video connection.

## 2025-05-27 NOTE — ASSESSMENT & PLAN NOTE
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   Treated with Sandostatin every 4 weeks, received on 5/13   Follows with [facility]

## 2025-05-27 NOTE — CASE MANAGEMENT
Case Management Assessment & Discharge Planning Note    Patient name Bailey Olsen  Location /-01 MRN [id number]  : 1970 Date 2025       Current Admission Date: 2025  Current Admission Diagnosis:KISHORE (acute kidney injury) (HCC)   Patient Active Problem List    Diagnosis Date Noted    Stage 3a chronic kidney disease (HCC) 2025    Scalp hematoma 2025    Contusion of back 2025    Ecchymosis 2025    Falls 2025    Chest pain 2025    Abdominal pain 2025    Acute kidney injury (HCC) 2025    Gram-negative bacteremia 2025    Acute respiratory failure (HCC) 2025    Carcinoid tumor of pancreas 2025    Hypomagnesemia 2025    Upper GI bleed 2025    Acute anemia 2025    Rectal bleeding 2025    Acute blood loss anemia 2025    Chronic, continuous use of opioids 2025    Acute metabolic encephalopathy 2025    SIRS (systemic inflammatory response syndrome) (HCC) 2025    Sacral fracture, closed ([facility]) 11/15/2024    KISHORE (acute kidney injury) (HCC) 10/04/2024    Morbid obesity with BMI of 40.0-44.9, adult (HCC) 10/04/2024    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) 2024    Fall from standing 2024    Closed compression fracture of L1 lumbar vertebra, sequela 2024    Closed stable burst fracture of first lumbar vertebra with routine healing 2024    Groin hematoma 2024    Ambulatory dysfunction 2024    Intractable back pain 2024    Hyperammonemia (HCC) 2024    Hepatic encephalopathy (HCC) 2024    Fall 2024    Liver cirrhosis secondary to HYATT (nonalcoholic steatohepatitis) (HCC) 2024    History of malignant carcinoid tumor of small intestine 2024    Class 3 severe obesity due to excess calories in adult 2024    Primary hypertension 2024    Hypokalemia 2024    Fibromyalgia  05/26/2024    Iron deficiency anemia 05/26/2024    Pancytopenia (HCC) 05/26/2024    Sarcoidosis 05/26/2024    Bilateral pulmonary infiltrates 05/26/2024      LOS (days): 2  Geometric Mean LOS (GMLOS) (days):   Days to GMLOS:     OBJECTIVE:  PATIENT READMITTED TO HOSPITAL  Risk of Unplanned Readmission Score: 67.52         Current admission status: Inpatient  Referral Reason: Other (dc planning)    Preferred Pharmacy:   [facility] [address] [place] [address] - [address] [facility]  [address] [facility] [address]  Phone: [phone] Fax: [phone]    [id number] - [place], [place] - [address] [facility] [facility]  [address] [place] [address]  Phone: [phone] Fax: [phone]    Primary Care Provider: Nayely Brown DO    Primary Insurance: MEDICARE  Secondary Insurance: BLUE CROSS    CM met with patient at the bedside,baseline information  was obtained. CM discussed the role of CM in helping the patient develop a discharge plan and assist the patient in carry out their plan.      ASSESSMENT:  Active Health Care Proxies       Cristian Olsen Health Care Representative - Spouse   Primary Phone: [phone] (Mobile)                 Advance Directives  Does patient have a Health Care POA?: No  Was patient offered paperwork?: Yes  Does patient currently have a Health Care decision maker?: Yes, please see Health Care Proxy section (Spouse)  Does patient have Advance Directives?: No  Was patient offered paperwork?: Yes  Primary Contact: Fidelina Colindres         Readmission Root Cause  30 Day Readmission: Yes  During your hospital stay, did someone (provider, nurse, ) explain your care to you in a way you could understand?: Yes  Did you feel medically stable to leave the hospital?: Yes  Were you able to pay for your medication at the pharmacy?: Yes  Did you have reliable transportation to take you to your appointments?: Yes  During previous admission, was a post-acute  recommendation made?: Yes  What post-acute resources were offered?: [facility]  Patient was readmitted due to: KISHORE with back pain- after a fall---- was here 5/2-6 with a fall as well  Action Plan: Therapy evaluation is pending, [facility] with PCP appointment    Patient Information  Admitted from:: Home  Mental Status: Alert  During Assessment patient was accompanied by: Not accompanied during assessment  Assessment information provided by:: Patient  Primary Caregiver: Self  Support Systems: Spouse/significant other, Daughter  County of Residence: [place] [facility] [place]  What city do you live in[id number]  Home entry access options. Select all that apply.: Stairs  Number of steps to enter home.: 1  Type of Current Residence: [address] [place] [address]  Upon entering residence, is there a bedroom on the main floor (no further steps)?: Yes  Upon entering residence, is there a bathroom on the main floor (no further steps)?: Yes  Living Arrangements: Lives w/ Spouse/significant other, Lives w/ Daughter  Is patient a ?: No    Activities of Daily Living Prior to Admission  Functional Status: Independent  Completes ADLs independently?: Yes  Ambulates independently?: Yes  Does patient use assisted devices?: Yes  Assisted Devices (DME) used: Walker  Does patient currently own DME?: Yes  What DME does the patient currently own?: Straight Cane, Walker, Bedside Commode  Does patient have a history of Outpatient Therapy (PT/OT)?: No  Does the patient have a history of Short-Term Rehab?: Yes ([facility]ly [facility] and [facility])  Does patient have a history of HHC?: Yes ([facility], per patient that has ended, [facility] and [facility] in the past)  Does patient currently have HHC?: No         Patient Information Continued  Income Source: SSI/SSD  Does patient have prescription coverage?: Yes  Can the patient afford their medications and any related supplies (such as glucometers or test strips)?: Yes  Does patient receive dialysis treatments?:  No  Does patient have a history of substance abuse?: No  Does patient have a history of Mental Health Diagnosis?: No         Means of Transportation  Means of Transport to Appts:: Family transport      Discussed readmission-- patient has not seen her PCP- agreeable for an appointment post dc around 1000 as she will have to find a ride.  Currently wants to go home with HHC      DISCHARGE DETAILS:    Discharge planning discussed with:: patient  Freedom of Choice: Yes  Comments - Freedom of Choice: Patient in agreement for HHC referral. No choice of agency, per patient [facility] services are done  CM contacted family/caregiver?: No- see comments                  Requested Home Health Care         Is the patient interested in HHC at discharge?: Yes  Home Health Discipline requested:: Nursing, Physical Therapy  Home Health Follow-Up Provider:: PCP  Home Health Services Needed:: Evaluate Functional Status and Safety, Gait/ADL Training  Homebound Criteria Met:: Uses an Assist Device (i.e. cane, walker, etc)  Supporting Clincal Findings:: Limited Endurance    DME Referral Provided  Referral made for DME?: No    Other Referral/Resources/Interventions Provided:  Interventions: HHC  Referral Comments: [facility] referral for HHC was made            Discharge Destination Plan::  (To be determined, therapy to see)         Will follow up on therapy recommendations.

## 2025-05-27 NOTE — ASSESSMENT & PLAN NOTE
History HYATT liver cirrhosis with hyperammonemia, no confusion or tremors  Lactulose 30 g 4 times daily, rifaximin twice daily  Torsemide and spironolactone on hold  Not currently taking beta-blockade  Monitor MELD labs  MELDS-NA=17  No significant ascites on abdominal exam  No history esophageal varices  Continue Protonix   MND level is elevated, trending down,  Resume Diuretics cautiously

## 2025-05-27 NOTE — UTILIZATION REVIEW
Initial Clinical Review    Admission: Date/Time/Statement:   Admission Orders (From admission, onward)       Ordered        05/25/25 1949  INPATIENT ADMISSION  Once                          Orders Placed This Encounter   Procedures    INPATIENT ADMISSION     Standing Status:   Standing     Number of Occurrences:   1     Level of Care:   Med Surg [16]     Estimated length of stay:   More than 2 Midnights     Certification:   I certify that inpatient services are medically necessary for this patient for a duration of greater than two midnights. See H&P and MD Progress Notes for additional information about the patient's course of treatment.     ED Arrival Information       Expected   -    Arrival   5/25/2025 18:17    Acuity   Urgent              Means of arrival   Ambulance    Escorted by   [facility]    Service   Hospitalist    Admission type   Emergency              Arrival complaint   -             Chief Complaint   Patient presents with    Flank Pain     Patient presents to the ED with complaints of right flank pain and generalized body pain secondary to a fall this morning. The patient was evaluated for the fall this morning, but states increase in pain.        Initial Presentation: 54 y.o. female to ED via EMS from home  Present to ED with back pain. Patient reports falling in her bathroom at 3 AM on day of arrival, was seen in the ED underwent trauma evaluation then return to due to worsening back pain and found to have KISHORE prompting medical service evaluation and treatment.   PMHX multiple falls, HYATT advanced liver cirrhosis, HTN, sarcoidosis, metastatic neuroendocrine tumor of GI origin on Sandostatin q. 28 days   Admitted to [facility] with DX: KISHORE (acute kidney injury)   on exam: pain 10/10 - back; ammonia level elevated; WBC 2.68; Heme 8.4; Cr 2.05 (baseline 1.04; T bili 1.31; AST 45  CT brain- Small right occipital scalp hematoma without evidence for fracture.   PLAN: cont lactulose po; monitor labs;  pain control (see below); consult nephrology; PT/ OT eval - tx; CM consulted; hold anticoagulation      Anticipated Length of Stay/Certification Statement: Patient will be admitted on an inpatient basis with an anticipated length of stay of greater than 2 midnights secondary to KISHORE.       Date: 5/26/25     Day 2: Inpatient   Ecchymosis: Located in upper back. Pain 5-9/10. Patient has liver cirrhosis, with thrombocytopenia, platelet is improving  creatinine is slowly improving, today creatinine is 1.63;  MELDS-NA=17   Plan: cont lactulose po; monitor labs; pain control (see below); consult nephrology; f/u PT/ OT recom; CM following; hold anticoagulation      NEPHROLOGY CONSULT   Acute kidney injury on top of chronic kidney disease:  Cr improved status post volume expansion and holding of diuretics.  Will continue to hold diuretics for now.  In general, volume management appears to be significant issue with this patient.    Volume depletion: Patient continued to take torsemide 20 mg twice daily even if she was not eating and drinking well, and if she had no swelling.  This was in addition to the spironolactone 25 mg daily which she also takes.  Plan: Will continue to avoid potential for toxins, look to optimize care as best as possible from volume standpoint the outpatient setting. Strongly recommended that she reduce the torsemide down to once daily in the outpatient setting, and only take twice daily if there is weight gain.        ___________________________________________________________________    Thoracic back contusion             ED Treatment-Medication Administration from 05/25/2025 1817 to 05/25/2025 2014         Date/Time Order Dose Route Action     05/25/2025 1908 fentaNYL injection 50 mcg 50 mcg Intravenous Given     05/25/2025 1956 sodium chloride 0.9 % bolus 1,000 mL 1,000 mL Intravenous New Bag     05/25/2025 2003 lactulose (CHRONULAC) oral solution 30 g 30 g Oral Given            Scheduled  Medications:  amitriptyline, 50 mg, Oral, HS  folic acid, 1 mg, Oral, Daily  lactulose, 30 g, Oral, 4x Daily  pantoprazole, 40 mg, Oral, Early Morning  rifaximin, 550 mg, Oral, Q12H MIGUEL      Continuous IV Infusions: None       PRN Meds:  cyclobenzaprine, 10 mg, Oral, TID PRN: 5/26 x2  ondansetron, 4 mg, Intravenous, Q6H PRN  oxyCODONE, 5 mg, Oral, Q6H PRN: 5/25 x1; 5/26 x3      ED Triage Vitals [05/25/25 1820]   Temperature Pulse Respirations Blood Pressure SpO2 Pain Score   98.4 °F (36.9 °C) 95 18 132/80 96 % 9     Weight (last 2 days)       Date/Time Weight    05/25/25 1820 121 (266.76)            Vital Signs (last 3 days)       Date/Time Temp Pulse Resp BP MAP (mmHg) SpO2 O2 Device Patient Position - Orthostatic VS Ramsay Coma Scale Score Pain    05/26/25 23:50:23 97.2 °F (36.2 °C) 85 -- 138/72 94 98 % None (Room air) Lying -- --    05/26/25 2119 -- -- -- -- -- -- -- -- -- 5 05/26/25 2100 -- -- -- -- -- -- -- -- 15 5 05/26/25 15:13:09 97.7 °F (36.5 °C) 90 18 117/69 85 96 % -- -- -- --    05/26/25 1153 -- -- -- -- -- -- -- -- -- 7    05/26/25 0911 -- -- -- -- -- -- -- -- -- 8    05/26/25 0900 -- -- -- -- -- -- None (Room air) -- -- --    05/26/25 07:36:13 97.5 °F (36.4 °C) 91 -- 126/71 89 93 % -- -- -- --    05/26/25 0534 -- -- -- -- -- -- -- -- -- 7 05/25/25 2242 -- -- -- -- -- -- -- -- -- 9    05/25/25 22:10:40 97.7 °F (36.5 °C) 93 17 99/53 68 93 % -- -- -- --    05/25/25 2135 97.7 °F (36.5 °C) 93 17 99/53 -- 93 % -- -- -- --    05/25/25 20:24:04 97.9 °F (36.6 °C) 99 18 141/66 91 99 % -- -- -- --    05/25/25 2024 -- -- -- -- -- 99 % None (Room air) -- 15 --    05/25/25 2021 -- -- -- -- -- -- -- -- -- 8    05/25/25 1908 -- -- -- -- -- -- -- -- -- 10 - Worst Possible Pain    05/25/25 1820 98.4 °F (36.9 °C) 95 18 132/80 -- 96 % None (Room air) Lying -- 9              Pertinent Labs/Diagnostic Test Results:     Results from last 7 days   Lab Units 05/26/25  0449 05/25/25  1900 05/25/25  0515   WBC  Thousand/uL 1.97* 2.68* 3.33*   HEMOGLOBIN g/dL 7.9* 8.4* 8.9*   HEMATOCRIT % 25.7* 27.0* 29.1*   PLATELETS Thousands/uL 81* 52* 60*   TOTAL NEUT ABS Thousands/µL  --  1.73*  --         Results from last 7 days   Lab Units 05/26/25 0449 05/25/25 1900 05/25/25 0437   SODIUM mmol/L 137 136 133*   POTASSIUM mmol/L 4.4 4.7 4.8   CHLORIDE mmol/L 106 101 101   CO2 mmol/L 23 28 24   ANION GAP mmol/L 8 7 8   BUN mg/dL 22 25 21   CREATININE mg/dL 1.63* 2.05* 1.71*   EGFR ml/min/1.73sq m 35 26 33   CALCIUM mg/dL 8.4 8.6 8.8   MAGNESIUM mg/dL 2.1  --   --      Results from last 7 days   Lab Units 05/26/25 0449 05/25/25 1900 05/25/25 0437   AST U/L 47* 45* 54*   ALT U/L 16 20 21   ALK PHOS U/L 176* 181* 205*   TOTAL PROTEIN g/dL 7.0 7.3 7.8   ALBUMIN g/dL 3.2* 3.5 3.6   TOTAL BILIRUBIN mg/dL 1.38* 1.31* 1.18*   AMMONIA umol/L 146* 168*  --         Results from last 7 days   Lab Units 05/26/25 0449 05/25/25 1900 05/25/25 0437   GLUCOSE RANDOM mg/dL 108 114 193*        Results from last 7 days   Lab Units 05/26/25 0449   PROTIME seconds 16.6*   INR  1.31*        Results from last 7 days   Lab Units 05/25/25  1900   LACTIC ACID mmol/L 1.1        Results from last 7 days   Lab Units 05/25/25  0437   LIPASE u/L 22        Results from last 7 days   Lab Units 05/26/25 2128   SODIUM UR mmol/L 51.0   CREATININE UR mg/dL 60.7          Past Medical History[1]  Present on Admission:   Liver cirrhosis secondary to HYATT (nonalcoholic steatohepatitis) (HCC)   KISHORE (acute kidney injury) (HCC)   History of malignant carcinoid tumor of small intestine   Pancytopenia (HCC)      Admitting Diagnosis: Back pain [M54.9]  Flank pain [R10.9]  KISHORE (acute kidney injury) (HCC) [N17.9]  Increased ammonia level [R79.89]  Age/Sex: 54 y.o. female    Network Utilization Review Department  ATTENTION: Please call with any questions or concerns to [phone] and carefully listen to the prompts so that you are directed to the right person. All  voicemails are confidential.   For Discharge needs, contact Care Management DC Support Team at [phone] opt. 2  Send all requests for admission clinical reviews, approved or denied determinations and any other requests to dedicated fax number below belonging to the campus where the patient is receiving treatment. List of dedicated fax numbers for the Facilities:  FACILITY NAME UR FAX NUMBER   ADMISSION DENIALS (Administrative/Medical Necessity) [phone]   DISCHARGE SUPPORT TEAM (NETWORK) [phone]   PARENT CHILD HEALTH (Maternity/NICU/Pediatrics) [phone]   [facility] [phone]   [facility] [phone]   [facility] [phone]   [facility] [phone]   [facility] [phone]   [facility] [phone]   [facility] [phone]   [facility] [phone]   [facility] [phone]   [facility] [phone]   [facility] [phone]   [facility] [phone]              [1]   Past Medical History:  Diagnosis Date    Back pain     Carcinoid tumor     neuroendocrine    CHF (congestive heart failure) (HCC)     Cirrhosis of liver (HCC)     Hypertension     Sarcoidosis

## 2025-05-27 NOTE — PHYSICAL THERAPY NOTE
"PHYSICAL THERAPY EVALUATION        NAME:  Bailey Olsen  DATE: 05/27/25    AGE:   54 y.o.  Mrn:   [id number]  ADMIT DX:  Back pain [M54.9]  Flank pain [R10.9]  KISHORE (acute kidney injury) (HCC) [N17.9]  Increased ammonia level [R79.89]    Past Medical History[1]  Length Of Stay: 2  Performed at least 2 patient identifiers during session: Name and Birthday        PHYSICAL THERAPY EVALUATION:       05/27/25 1351   PT Last Visit   PT Visit Date 05/27/25   Note Type   Note type Evaluation   Pain Assessment   Pain Assessment Tool 0-10   Pain Score 8   Pain Location/Orientation Orientation: Right;Orientation: Upper;Location: Back   Restrictions/Precautions   Weight Bearing Precautions Per Order No   Other Precautions Chair Alarm;Bed Alarm;Fall Risk;Pain  (multiple re-admissions)   Home Living   Type of Home House   Home Layout Two level;Performs ADLs on one level;Able to live on main level with bedroom/bathroom  (1 FABRICIO; \"I don't go up & down the stairs\")   Bathroom Shower/Tub Tub/shower unit   Bathroom Toilet Raised   Bathroom Equipment Grab bars in shower;Commode;Grab bars around toilet   Bathroom Accessibility Accessible via walker   Home Equipment Walker;Cane   Additional Comments sleeps in regular bed   Prior Function   Level of Ada Needs assistance with IADLS;Needs assistance with ADLs;Independent with functional mobility   Lives With Spouse;Daughter  (spouse works full-time during the day)   Receives Help From Family   IADLs Independent with meal prep;Independent with medication management;Family/Friend/Other provides transportation   Falls in the last 6 months   (\"I think 2 or 3\")   Comments Mod (I) with RW vs. no AD.  \"I use the walker quite a bit\"   General   Additional Pertinent History 20 ED/hospital encounters in the last year   Family/Caregiver Present No   Cognition   Arousal/Participation Alert   Orientation Level Oriented X4   Bed Mobility   Supine to Sit 3  Moderate assistance   Additional items " Assist x 1;Increased time required;Verbal cues   Sit to Supine 5  Supervision   Additional items Increased time required   Transfers   Sit to Stand 5  Supervision   Additional items Verbal cues   Stand to Sit 4  Minimal assistance   Additional items Assist x 1;Increased time required;Verbal cues   Stand pivot 4  Minimal assistance   Additional items Assist x 1;Increased time required;Verbal cues   Additional Comments RW   Ambulation/Elevation   Gait pattern Improper Weight shift;Excessively slow;Decreased hip extension   Gait Assistance 4  Minimal assist   Additional items Assist x 1;Verbal cues   Assistive Device Rolling walker   Distance 14 ft   Balance   Static Sitting Good   Dynamic Sitting Fair   Static Standing Fair -   Dynamic Standing Poor +   Ambulatory Fair -  (RW)   Endurance Deficit   Endurance Deficit Yes   Activity Tolerance   Activity Tolerance Patient limited by fatigue;Patient limited by pain   Assessment   Prognosis Fair   Problem List Decreased strength;Decreased endurance;Impaired balance;Decreased mobility;Obesity;Pain;Decreased skin integrity   Barriers to Discharge Other (Comment)   Barriers to Discharge Comments pain; limited ambulation distance; high fall risk; # of readmissions; recent falls; spouse works during the day   Goals   STG Expiration Date 06/10/25   PT Treatment Day 0   Plan   Treatment/Interventions Functional transfer training;LE strengthening/ROM;Elevations;Therapeutic exercise;Endurance training;Patient/family training;Equipment eval/education;Bed mobility;Gait training;Compensatory technique education;OT;Spoke to nursing   PT Frequency 3-5x/wk   Discharge Recommendation   Rehab Resource Intensity Level, PT II (Moderate Resource Intensity)   AM-PAC Basic Mobility Inpatient   Turning in Flat Bed Without Bedrails 3   Lying on Back to Sitting on Edge of Flat Bed Without Bedrails 2   Moving Bed to Chair 3   Standing Up From Chair Using Arms 3   Walk in Room 3   Climb 3-5 Stairs  With Railing 1   Basic Mobility Inpatient Raw Score 15   Basic Mobility Standardized Score 36.97   [facility] Highest Level Of Mobility   -[facility] Goal 4: Move to chair/commode   -[facility] Achieved 6: Walk 10 steps or more   End of Consult   Patient Position at End of Consult Supine;Bed/Chair alarm activated;All needs within reach      05/27/25 1357 05/27/25 1400 05/27/25 1403   Vitals   Pulse 101  --   --    Blood Pressure 135/66 107/55 121/60   MAP (mmHg) 89 72 80   Patient Position - Orthostatic VS Lying Sitting Standing     (Please find full objective findings from PT assessment regarding body systems outlined above).     Assessment: Pt is a 54 y.o. female seen for PT evaluation s/p admission to [facility] on 5/25/2025 with KISHORE (acute kidney injury) (HCC).  Order placed for PT services.  Upon evaluation: Pt is presenting with impaired functional mobility due to pain, decreased strength, decreased endurance, impaired balance, gait deviations, impaired judgment, fall risk, and impaired skin integrity requiring mod assistance for bed mobility and min assistance for transfers and ambulation with RW. Pt's clinical presentation is currently unstable/unpredictable given the functional mobility deficits above, especially weakness and decreased activity tolerance, coupled with fall risks as indicated by AM-PAC 6-Clicks: 15/24 as well as hx of falls, polypharmacy, and obesity and combined with medical complications of pain impacting overall mobility status, abnormal H&H, abnormal WBCs, abnormal CBC, abnormal blood sugars, readmission to hospital, and need for input for mobility technique/safety.  Pt's PMHx and comorbidities that may affect physical performance and progress include: obesity and cancer history and/or treatment. Personal factors affecting pt at time of IE include: hx of non-compliance, anxiety, depression, step(s) to enter environment, past experience, inability to perform IADLs, inability to  perform ADLs, inability to ambulate household distances, inability to navigate community distances, and recent fall(s)/fall history. Pt will benefit from continued skilled PT services to address deficits as defined above and to maximize level of functional mobility to facilitate return toward PLOF and improved QOL. From PT/mobility standpoint, recommendation at time of d/c would be Level II (Moderate Resource Intensity pending progress in order to reduce fall risk and maximize pt's functional independence and consistency with mobility in order to facilitate return to PLOF.  Recommend trial with walker next 1-2 sessions.     The patient's AM-PAC Basic Mobility Inpatient Short Form Raw Score is 15. A Raw score of less than or equal to 16 suggests the patient may benefit from discharge to post-acute rehabilitation services. Please also refer to the recommendation of the Physical Therapist for safe discharge planning.       Goals: Pt will perform bed mobility tasks with modified I to reposition in bed and prepare for transfers. Pt will perform transfers with modified I to increase Indep in home environment and decrease burden of care and prepare for ambulation. Pt will ambulate with RW for >/= 150 ft with  Supervision  to decrease risk for falls, improve activity tolerance, and improve gait quality and to access home environment. Pt will complete >/= 1 step with RW with Supervision to return to home with FABRICIO.          Otoniel Lafleur, PT,DPT             [1]   Past Medical History:  Diagnosis Date    Back pain     Carcinoid tumor     neuroendocrine    CHF (congestive heart failure) (HCC)     Cirrhosis of liver (HCC)     Hypertension     Sarcoidosis

## 2025-05-27 NOTE — ASSESSMENT & PLAN NOTE
Presented with flank pain after fall early this morning  Creatinine 2.05 from baseline 1.04-creatinine is now back to baseline  Possible secondary to HRS versus prerenal with poor oral intake  Torsemide and spironolactone on hold  Trend creatinine  Appreciate nephrology consultation  CT chest abdomen pelvis no evidence of obstructive disease  Resume Aldactone 25 mg daily and decrease dose of torsemide to 20 mg once daily and restart

## 2025-05-27 NOTE — PROGRESS NOTES
Progress Note - Hospitalist   Name: Bailey Olsen 54 y.o. female I MRN: [id number]  Unit/Bed#: -01 I Date of Admission: 5/25/2025   Date of Service: 5/27/2025 I Hospital Day: 2    Assessment & Plan  KISHORE (acute kidney injury) (HCC)  Presented with flank pain after fall early this morning  Creatinine 2.05 from baseline 1.04-creatinine is now back to baseline  Possible secondary to HRS versus prerenal with poor oral intake  Torsemide and spironolactone on hold  Trend creatinine  Appreciate nephrology consultation  CT chest abdomen pelvis no evidence of obstructive disease  Resume Aldactone 25 mg daily and decrease dose of torsemide to 20 mg once daily and restart  Liver cirrhosis secondary to HYATT (nonalcoholic steatohepatitis) (HCC)  History HYATT liver cirrhosis with hyperammonemia, no confusion or tremors  Lactulose 30 g 4 times daily, rifaximin twice daily  Torsemide and spironolactone on hold  Not currently taking beta-blockade  Monitor MELD labs  MELDS-NA=17  No significant ascites on abdominal exam  No history esophageal varices  Continue Protonix   MND level is elevated, trending down,  Resume Diuretics cautiously  History of malignant carcinoid tumor of small intestine  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   Treated with Sandostatin every 4 weeks, received on 5/13   Follows with [facility] oncology   Pancytopenia (HCC)  Chronic secondary to advanced liver disease  Appears baseline  Morbid obesity with BMI of 40.0-44.9, adult  ([facility]ly [facility])  BMI 44  Morbid obesity complicating all aspects of healthcare  Requires intensive lifestyle modification    Falls  Fall at home in bathroom at 3 AM on day of admission   Trauma evaluation revealed scalp hematoma appreciate PT/OT/case management prior to discharge  Old rib fractures noted on trauma evaluation  May need subacute rehab if she agrees  Scalp hematoma  S/p fall in bathroom  CT brain- Small right occipital scalp hematoma without evidence for fracture.   Neuroexam nonfocal  Contusion of back  Fall in the bathroom at 3 AM the morning of presentation  Noted to have large ecchymotic area thoracic spine  Hold anticoagulation  Trend H&H  Stage 3a chronic kidney disease (HCC)  Lab Results   Component Value Date    EGFR 68 05/27/2025    EGFR 35 05/26/2025    EGFR 26 05/25/2025    CREATININE 0.94 05/27/2025    CREATININE 1.63 (H) 05/26/2025    CREATININE 2.05 (H) 05/25/2025       VTE Pharmacologic Prophylaxis:   Moderate Risk (Score 3-4) - Pharmacological DVT Prophylaxis Contraindicated. Sequential Compression Devices Ordered.    Mobility:   Basic Mobility Inpatient Raw Score: 15  JH-HLM Goal: 4: Move to chair/commode  JH-HLM Achieved: 6: Walk 10 steps or more  JH-HLM Goal NOT achieved. Continue with multidisciplinary rounding and encourage appropriate mobility to improve upon JH-HLM goals.    Patient Centered Rounds: I performed bedside rounds with nursing staff today.   Discussions with Specialists or Other Care Team Provider: none    Education and Discussions with Family / Patient: will update family    Current Length of Stay: 2 day(s)  Current Patient Status: Inpatient   Certification Statement: The patient will continue to require additional inpatient hospital stay due to cheryle  Discharge Plan: Anticipate discharge tomorrow to discharge location to be determined pending rehab evaluations.    Code Status: Level 1 - Full Code    Subjective   Patient states that she feels unsteady at times and has a large  bruise on her back from her recent fall.  She states she is compliant with her medications and watches what she eats but keeps getting admitted to the hospital which is frustrating to her    Objective :  Temp:  [97.2 °F (36.2 °C)-97.7 °F (36.5 °C)] 97.3 °F (36.3 °C)  HR:  [] 101  BP: (107-163)/(55-92) 121/60  Resp:  [18] 18  SpO2:  [96 %-99 %] 99 %  O2 Device: None (Room air)    Body mass index is 44.39 kg/m².     Input and Output Summary (last 24 hours):     Intake/Output Summary (Last 24 hours) at 5/27/2025 1510  Last data filed at 5/27/2025 1300  Gross per 24 hour   Intake --   Output 1275 ml   Net -1275 ml       Physical Exam  Vitals and nursing note reviewed.   Constitutional:       Appearance: Normal appearance.   HENT:      Head: Normocephalic and atraumatic.      Right Ear: External ear normal.      Left Ear: External ear normal.      Nose: Nose normal.      Mouth/Throat:      Pharynx: Oropharynx is clear.     Eyes:      Pupils: Pupils are equal, round, and reactive to light.       Cardiovascular:      Rate and Rhythm: Normal rate and regular rhythm.      Heart sounds: Normal heart sounds.   Pulmonary:      Effort: Pulmonary effort is normal.      Breath sounds: Normal breath sounds.   Abdominal:      General: Bowel sounds are normal.      Palpations: Abdomen is soft.      Tenderness: There is no abdominal tenderness.     Musculoskeletal:         General: Normal range of motion.      Cervical back: Normal range of motion and neck supple.     Skin:     General: Skin is warm and dry.      Capillary Refill: Capillary refill takes less than 2 seconds.      Findings: Bruising present.     Neurological:      General: No focal deficit present.      Mental Status: She is alert and oriented to person, place, and time.     Psychiatric:         Mood and Affect: Mood normal.           Lines/Drains:              Lab Results: I have reviewed the following results:   Results from last 7 days   Lab Units  05/27/25  0611   WBC Thousand/uL 1.41*   HEMOGLOBIN g/dL 8.2*   HEMATOCRIT % 27.0*   PLATELETS Thousands/uL 49*   SEGS PCT % 47   LYMPHO PCT % 35   MONO PCT % 11   EOS PCT % 6     Results from last 7 days   Lab Units 05/27/25  0611   SODIUM mmol/L 137   POTASSIUM mmol/L 4.3   CHLORIDE mmol/L 104   CO2 mmol/L 27   BUN mg/dL 12   CREATININE mg/dL 0.94   ANION GAP mmol/L 6   CALCIUM mg/dL 8.4   ALBUMIN g/dL 3.2*   TOTAL BILIRUBIN mg/dL 1.04*   ALK PHOS U/L 180*   ALT U/L 19   AST U/L 54*   GLUCOSE RANDOM mg/dL 150*     Results from last 7 days   Lab Units 05/27/25  0851   INR  1.35*             Results from last 7 days   Lab Units 05/25/25  1900   LACTIC ACID mmol/L 1.1       Recent Cultures (last 7 days):         Imaging Results Review: I reviewed radiology reports from this admission including: CT chest and CT abdomen/pelvis.  Other Study Results Review: EKG was reviewed.     Last 24 Hours Medication List:     Current Facility-Administered Medications:     amitriptyline (ELAVIL) tablet 50 mg, HS    cyclobenzaprine (FLEXERIL) tablet 10 mg, TID PRN    folic acid (FOLVITE) tablet 1 mg, Daily    lactulose (CHRONULAC) oral solution 30 g, 4x Daily    ondansetron (ZOFRAN) injection 4 mg, Q6H PRN    oxyCODONE (ROXICODONE) IR tablet 5 mg, Q6H PRN    pantoprazole (PROTONIX) EC tablet 40 mg, Early Morning    rifaximin (XIFAXAN) tablet 550 mg, Q12H MIGUEL    spironolactone (ALDACTONE) tablet 25 mg, Daily    torsemide (DEMADEX) tablet 20 mg, Daily    Administrative Statements   Today, Patient Was Seen By: Idalia Stuart MD      **Please Note: This note may have been constructed using a voice recognition system.**

## 2025-05-27 NOTE — PLAN OF CARE
Problem: PAIN - ADULT  Goal: Verbalizes/displays adequate comfort level or baseline comfort level  Description: Interventions:  - Encourage patient to monitor pain and request assistance  - Assess pain using appropriate pain scale  - Administer analgesics as ordered based on type and severity of pain and evaluate response  - Implement non-pharmacological measures as appropriate and evaluate response  - Consider cultural and social influences on pain and pain management  - Notify physician/advanced practitioner if interventions unsuccessful or patient reports new pain  - Educate patient/family on pain management process including their role and importance of  reporting pain   - Provide non-pharmacologic/complimentary pain relief interventions  Outcome: Progressing     Problem: INFECTION - ADULT  Goal: Absence or prevention of progression during hospitalization  Description: INTERVENTIONS:  - Assess and monitor for signs and symptoms of infection  - Monitor lab/diagnostic results  - Monitor all insertion sites, i.e. indwelling lines, tubes, and drains  - Monitor endotracheal if appropriate and nasal secretions for changes in amount and color  - [place] appropriate cooling/warming therapies per order  - Administer medications as ordered  - Instruct and encourage patient and family to use good hand hygiene technique  - Identify and instruct in appropriate isolation precautions for identified infection/condition  Outcome: Progressing  Goal: Absence of fever/infection during neutropenic period  Description: INTERVENTIONS:  - Monitor WBC  - Perform strict hand hygiene  - Limit to healthy visitors only  - No plants, dried, fresh or silk flowers with ortiz in patient room  Outcome: Progressing     Problem: SAFETY ADULT  Goal: Patient will remain free of falls  Description: INTERVENTIONS:  - Educate patient/family on patient safety including physical limitations  - Instruct patient to call for assistance with activity   -  Consider consulting OT/PT to assist with strengthening/mobility based on AM PAC & JH-HLM score  - Consult OT/PT to assist with strengthening/mobility   - Keep Call bell within reach  - Keep bed low and locked with side rails adjusted as appropriate  - Keep care items and personal belongings within reach  - Initiate and maintain comfort rounds  - Make Fall Risk Sign visible to staff  - Offer Toileting every  Hours, in advance of need  - Initiate/Maintain alarm  - Obtain necessary fall risk management equipment:   - Apply yellow socks and bracelet for high fall risk patients  - Consider moving patient to room near nurses station  Outcome: Progressing  Goal: Maintain or return to baseline ADL function  Description: INTERVENTIONS:  -  Assess patient's ability to carry out ADLs; assess patient's baseline for ADL function and identify physical deficits which impact ability to perform ADLs (bathing, care of mouth/teeth, toileting, grooming, dressing, etc.)  - Assess/evaluate cause of self-care deficits   - Assess range of motion  - Assess patient's mobility; develop plan if impaired  - Assess patient's need for assistive devices and provide as appropriate  - Encourage maximum independence but intervene and supervise when necessary  - Involve family in performance of ADLs  - Assess for home care needs following discharge   - Consider OT consult to assist with ADL evaluation and planning for discharge  - Provide patient education as appropriate  - Monitor functional capacity and physical performance, use of AM PAC & JH-HLM   - Monitor gait, balance and fatigue with ambulation    Outcome: Progressing  Goal: Maintains/Returns to pre admission functional level  Description: INTERVENTIONS:  - Perform AM-PAC 6 Click Basic Mobility/ Daily Activity assessment daily.  - Set and communicate daily mobility goal to care team and patient/family/caregiver.   - Collaborate with rehabilitation services on mobility goals if consulted  -  Perform Range of Motion  times a day.  - Reposition patient every  hours.  - Dangle patient  times a day  - Stand patient  times a day  - Ambulate patient  times a day  - Out of bed to chair  times a day   - Out of bed for meals  times a day  - Out of bed for toileting  - Record patient progress and toleration of activity level   Outcome: Progressing     Problem: DISCHARGE PLANNING  Goal: Discharge to home or other facility with appropriate resources  Description: INTERVENTIONS:  - Identify barriers to discharge w/patient and caregiver  - Arrange for needed discharge resources and transportation as appropriate  - Identify discharge learning needs (meds, wound care, etc.)  - Arrange for interpretive services to assist at discharge as needed  - Refer to Case Management Department for coordinating discharge planning if the patient needs post-hospital services based on physician/advanced practitioner order or complex needs related to functional status, cognitive ability, or social support system  Outcome: Progressing     Problem: Knowledge Deficit  Goal: Patient/family/caregiver demonstrates understanding of disease process, treatment plan, medications, and discharge instructions  Description: Complete learning assessment and assess knowledge base.  Interventions:  - Provide teaching at level of understanding  - Provide teaching via preferred learning methods  Outcome: Progressing     Problem: Prexisting or High Potential for Compromised Skin Integrity  Goal: Skin integrity is maintained or improved  Description: INTERVENTIONS:  - Identify patients at risk for skin breakdown  - Assess and monitor skin integrity including under and around medical devices   - Assess and monitor nutrition and hydration status  - Monitor labs  - Assess for incontinence   - Turn and reposition patient  - Assist with mobility/ambulation  - Relieve pressure over mariano prominences   - Avoid friction and shearing  - Provide appropriate hygiene  as needed including keeping skin clean and dry  - Evaluate need for skin moisturizer/barrier cream  - Collaborate with interdisciplinary team  - Patient/family teaching  - Consider wound care consult    Assess:  - Review Royce scale daily  - Clean and moisturize skin every   - Inspect skin when repositioning, toileting, and assisting with ADLS  - Assess under medical devices such as  every   - Assess extremities for adequate circulation and sensation     Bed Management:  - Have minimal linens on bed & keep smooth, unwrinkled  - Change linens as needed when moist or perspiring  - Avoid sitting or lying in one position for more than  hours while in bed?Keep HOB at degrees   - Toileting:  - Offer bedside commode  - Assess for incontinence every   - Use incontinent care products after each incontinent episode such as     Activity:  - Mobilize patient  times a day  - Encourage activity and walks on unit  - Encourage or provide ROM exercises   - Turn and reposition patient every  Hours  - Use appropriate equipment to lift or move patient in bed  - Instruct/ Assist with weight shifting every  when out of bed in chair  - Consider limitation of chair time  hour intervals    Skin Care:  - Avoid use of baby powder, tape, friction and shearing, hot water or constrictive clothing  - Relieve pressure over bony prominences using   - Do not massage red bony areas    Next Steps:  - Teach patient strategies to minimize risks such as   - Consider consults to  interdisciplinary teams such as  Outcome: Progressing     Problem: MUSCULOSKELETAL - ADULT  Goal: Maintain or return mobility to safest level of function  Description: INTERVENTIONS:  - Assess patient's ability to carry out ADLs; assess patient's baseline for ADL function and identify physical deficits which impact ability to perform ADLs (bathing, care of mouth/teeth, toileting, grooming, dressing, etc.)  - Assess/evaluate cause of self-care deficits   - Assess range of  motion  - Assess patient's mobility  - Assess patient's need for assistive devices and provide as appropriate  - Encourage maximum independence but intervene and supervise when necessary  - Involve family in performance of ADLs  - Assess for home care needs following discharge   - Consider OT consult to assist with ADL evaluation and planning for discharge  - Provide patient education as appropriate  Outcome: Progressing  Goal: Maintain proper alignment of affected body part  Description: INTERVENTIONS:  - Support, maintain and protect limb and body alignment  - Provide patient/ family with appropriate education  Outcome: Progressing

## 2025-05-28 ENCOUNTER — PATIENT OUTREACH (OUTPATIENT)
Dept: CASE MANAGEMENT | Facility: OTHER | Age: 55
End: 2025-05-28

## 2025-05-28 VITALS
TEMPERATURE: 97.3 F | HEIGHT: 65 IN | SYSTOLIC BLOOD PRESSURE: 127 MMHG | BODY MASS INDEX: 44.44 KG/M2 | OXYGEN SATURATION: 96 % | DIASTOLIC BLOOD PRESSURE: 66 MMHG | WEIGHT: 266.76 LBS | RESPIRATION RATE: 18 BRPM | HEART RATE: 88 BPM

## 2025-05-28 LAB
ANION GAP SERPL CALCULATED.3IONS-SCNC: 9 MMOL/L (ref 4–13)
BUN SERPL-MCNC: 11 MG/DL (ref 5–25)
CALCIUM SERPL-MCNC: 8.4 MG/DL (ref 8.4–10.2)
CHLORIDE SERPL-SCNC: 101 MMOL/L (ref 96–108)
CO2 SERPL-SCNC: 28 MMOL/L (ref 21–32)
CREAT SERPL-MCNC: 1.02 MG/DL (ref 0.6–1.3)
ERYTHROCYTE [DISTWIDTH] IN BLOOD BY AUTOMATED COUNT: 17.9 % (ref 11.6–15.1)
GFR SERPL CREATININE-BSD FRML MDRD: 62 ML/MIN/1.73SQ M
GLUCOSE SERPL-MCNC: 109 MG/DL (ref 65–140)
HCT VFR BLD AUTO: 28.9 % (ref 34.8–46.1)
HGB BLD-MCNC: 8.8 G/DL (ref 11.5–15.4)
MCH RBC QN AUTO: 28.6 PG (ref 26.8–34.3)
MCHC RBC AUTO-ENTMCNC: 30.4 G/DL (ref 31.4–37.4)
MCV RBC AUTO: 94 FL (ref 82–98)
PLATELET # BLD AUTO: 66 THOUSANDS/UL (ref 149–390)
PMV BLD AUTO: 10.4 FL (ref 8.9–12.7)
POTASSIUM SERPL-SCNC: 3.9 MMOL/L (ref 3.5–5.3)
RBC # BLD AUTO: 3.08 MILLION/UL (ref 3.81–5.12)
SODIUM SERPL-SCNC: 138 MMOL/L (ref 135–147)
WBC # BLD AUTO: 2.55 THOUSAND/UL (ref 4.31–10.16)

## 2025-05-28 PROCEDURE — 80048 BASIC METABOLIC PNL TOTAL CA: CPT | Performed by: FAMILY MEDICINE

## 2025-05-28 PROCEDURE — 99232 SBSQ HOSP IP/OBS MODERATE 35: CPT | Performed by: INTERNAL MEDICINE

## 2025-05-28 PROCEDURE — 99239 HOSP IP/OBS DSCHRG MGMT >30: CPT | Performed by: FAMILY MEDICINE

## 2025-05-28 PROCEDURE — 85027 COMPLETE CBC AUTOMATED: CPT | Performed by: FAMILY MEDICINE

## 2025-05-28 RX ORDER — OXYCODONE AND ACETAMINOPHEN 5; 325 MG/1; MG/1
1 TABLET ORAL EVERY 12 HOURS PRN
Qty: 10 TABLET | Refills: 0 | Status: SHIPPED | OUTPATIENT
Start: 2025-05-28 | End: 2025-06-07

## 2025-05-28 RX ORDER — TORSEMIDE 20 MG/1
20 TABLET ORAL DAILY
Start: 2025-05-29

## 2025-05-28 RX ORDER — PANTOPRAZOLE SODIUM 40 MG/1
40 TABLET, DELAYED RELEASE ORAL
Qty: 30 TABLET | Refills: 0
Start: 2025-05-28

## 2025-05-28 RX ADMIN — LACTULOSE 30 G: 20 SOLUTION ORAL at 12:18

## 2025-05-28 RX ADMIN — SPIRONOLACTONE 25 MG: 25 TABLET ORAL at 08:59

## 2025-05-28 RX ADMIN — FOLIC ACID 1 MG: 1 TABLET ORAL at 08:59

## 2025-05-28 RX ADMIN — PANTOPRAZOLE SODIUM 40 MG: 40 TABLET, DELAYED RELEASE ORAL at 04:23

## 2025-05-28 RX ADMIN — OXYCODONE HYDROCHLORIDE 5 MG: 5 TABLET ORAL at 09:03

## 2025-05-28 RX ADMIN — RIFAXIMIN 550 MG: 550 TABLET ORAL at 08:59

## 2025-05-28 RX ADMIN — LACTULOSE 30 G: 20 SOLUTION ORAL at 08:59

## 2025-05-28 RX ADMIN — TORSEMIDE 20 MG: 20 TABLET ORAL at 08:59

## 2025-05-28 RX ADMIN — CYCLOBENZAPRINE 10 MG: 10 TABLET, FILM COATED ORAL at 09:03

## 2025-05-28 RX ADMIN — CYCLOBENZAPRINE 10 MG: 10 TABLET, FILM COATED ORAL at 04:22

## 2025-05-28 NOTE — PROGRESS NOTES
Email received from [employer] Util[employer] Committee today.  Committee reviewed and determined a care plan is not appropriate at this time.  Patient is chronically ill and care plan would not be effective.  No further recommendations could be made at this time.  No new referral placed to OP CM as less than 90 days since last outreach closure.  OP CM Dept Watch list updated.

## 2025-05-28 NOTE — ASSESSMENT & PLAN NOTE
Fall in the bathroom at 3 AM the morning of presentation  Noted to have large ecchymotic area thoracic spine  Hold anticoagulation  hemoglobin stable at 8.8

## 2025-05-28 NOTE — ASSESSMENT & PLAN NOTE
Etiology most compatible with prerenal as she has resolved at this time  Diuretic wise: Decrease torsemide 20 mg daily: Dose to once daily and only take twice daily if she gains 2 pounds in 2 consecutive days i.e. 4 pounds in 2 days.   Current creatinine: 1.02 back to baseline

## 2025-05-28 NOTE — ASSESSMENT & PLAN NOTE
Fall at home in bathroom at 3 AM on day of admission   Trauma evaluation revealed scalp hematoma appreciate PT/OT/case management prior to discharge  Old rib fractures noted on trauma evaluation  needs subacute rehab

## 2025-05-28 NOTE — PROGRESS NOTES
Progress Note - Nephrology   Name: Bailey Olsen 54 y.o. female I MRN: [id number]  Unit/Bed#: MS 230Swati I Date of Admission: 5/25/2025   Date of Service: 5/28/2025 I Hospital Day: 3     Assessment & Plan  KISHORE (acute kidney injury) (HCC)  Etiology most compatible with prerenal as she has resolved at this time  Diuretic wise: Decrease torsemide 20 mg daily: Dose to once daily and only take twice daily if she gains 2 pounds in 2 consecutive days i.e. 4 pounds in 2 days.   Current creatinine: 1.02 back to baseline    Stage 3a chronic kidney disease (HCC)  Back to baseline as outlined above      Liver cirrhosis secondary to HYATT (nonalcoholic steatohepatitis) (HCC)  Maintaining Aldactone to help with ascites as well as lactulose and rifaximin as per primary team.    I have reviewed the nephrology recommendations including continue current treatment with torsemide 20 mg daily, with SLIM, and we are in agreement with renal plan including the information outlined above.      We will see the patient as needed please feel free to call if any further questions or concerns thank you so much!         Subjective   Brief History of Admission -we are seeing for KISHORE    No shortness of breath or chest pain  No nausea vomiting or diarrhea  No urinary symptoms    Objective :  Temp:  [97.3 °F (36.3 °C)-97.7 °F (36.5 °C)] 97.3 °F (36.3 °C)  HR:  [] 88  BP: (107-163)/(55-92) 127/66  Resp:  [18-19] 18  SpO2:  [93 %-99 %] 96 %  O2 Device: None (Room air)    Current Weight: Weight - Scale: 121 kg (266 lb 12.1 oz)  First Weight: Weight - Scale: 121 kg (266 lb 12.1 oz)  I/O         05/26 0701 05/27 0700 05/27 0701 05/28 0700 05/28 0701 05/29 0700    P.O. 240  240    Total Intake(mL/kg) 240 (2)  240 (2)    Urine (mL/kg/hr) 1434 (0.5) 2005 (0.7) 500 (1.2)    Stool       Total Output 1434 2005 500    Net -1194 -2005 -260           Unmeasured Urine Occurrence 1 x 3 x           Physical Exam  Physical Exam: General:  No acute  "distress  Skin:  No acute rash/ecchymosis on her back  Eyes:  No scleral icterus and noninjected  ENT:  Moist mucous membranes  Neck:  Supple, no jugular venous distention, trachea midline, overall appearance is normal  Chest:  Clear to auscultation  CVS:  Regular rate and rhythm, without a rub or gallops  Abdomen:  Normal bowel sounds, soft and nontender and nondistended  Extremities:  No edema, and no cyanosis, no significant arthritic changes  Neuro:  No gross focality  Psych:  Alert and oriented and appropriate    Medications:  Current Medications[1]      Lab Results: I have reviewed the following results:  Results from last 7 days   Lab Units 05/28/25  0432 05/27/25  0611 05/26/25  0449 05/25/25  1900 05/25/25  0515 05/25/25  0437   WBC Thousand/uL 2.55* 1.41* 1.97* 2.68* 3.33*  --    HEMOGLOBIN g/dL 8.8* 8.2* 7.9* 8.4* 8.9*  --    HEMATOCRIT % 28.9* 27.0* 25.7* 27.0* 29.1*  --    PLATELETS Thousands/uL 66* 49* 81* 52* 60*  --    POTASSIUM mmol/L 3.9 4.3 4.4 4.7  --  4.8   CHLORIDE mmol/L 101 104 106 101  --  101   CO2 mmol/L 28 27 23 28  --  24   BUN mg/dL 11 12 22 25  --  21   CREATININE mg/dL 1.02 0.94 1.63* 2.05*  --  1.71*   CALCIUM mg/dL 8.4 8.4 8.4 8.6  --  8.8   MAGNESIUM mg/dL  --   --  2.1  --   --   --    ALBUMIN g/dL  --  3.2* 3.2* 3.5  --  3.6       Administrative Statements     Portions of the record may have been created with voice recognition software. Occasional wrong word or \"sound a like\" substitutions may have occurred due to the inherent limitations of voice recognition software. Read the chart carefully and recognize, using context, where substitutions have occurred.If you have any questions, please contact the dictating provider.       [1]   Current Facility-Administered Medications:     amitriptyline (ELAVIL) tablet 50 mg, 50 mg, Oral, HS, MAGDA Stark, 50 mg at 05/27/25 2237    cyclobenzaprine (FLEXERIL) tablet 10 mg, 10 mg, Oral, TID PRN, MAGDA Stark, 10 mg at 05/28/25 " 0903    folic acid (FOLVITE) tablet 1 mg, 1 mg, Oral, Daily, Caro S Suzanne, CRNP, 1 mg at 05/28/25 0859    lactulose (CHRONULAC) oral solution 30 g, 30 g, Oral, 4x Daily, Caro S Suzanne, CRNP, 30 g at 05/28/25 0859    ondansetron (ZOFRAN) injection 4 mg, 4 mg, Intravenous, Q6H PRN, Caro S Suzanne, CRNP    oxyCODONE (ROXICODONE) IR tablet 5 mg, 5 mg, Oral, Q6H PRN, Caro S Suzanne, CRNP, 5 mg at 05/28/25 0903    pantoprazole (PROTONIX) EC tablet 40 mg, 40 mg, Oral, Early Morning, Caro S Suzanne, CRNP, 40 mg at 05/28/25 0423    polyethylene glycol (MIRALAX) packet 17 g, 17 g, Oral, Daily PRN, Idalia Stuart MD    rifaximin (XIFAXAN) tablet 550 mg, 550 mg, Oral, Q12H MIGUEL, Caro S Suzanne, CRNP, 550 mg at 05/28/25 0859    spironolactone (ALDACTONE) tablet 25 mg, 25 mg, Oral, Daily, Idalia Stuart MD, 25 mg at 05/28/25 0859    torsemide (DEMADEX) tablet 20 mg, 20 mg, Oral, Daily, Idalia Stuart MD, 20 mg at 05/28/25 0859

## 2025-05-28 NOTE — ASSESSMENT & PLAN NOTE
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   Treated with Sandostatin every 4 weeks, received on 5/13   Follows with [facility]

## 2025-05-28 NOTE — CASE MANAGEMENT
Case Management Discharge Planning Note    Patient name Bailey Olsen  Location /-01 MRN [id number]  : 1970 Date 2025       Current Admission Date: 2025  Current Admission Diagnosis:KISHORE (acute kidney injury) (HCC)   Patient Active Problem List    Diagnosis Date Noted    Stage 3a chronic kidney disease (HCC) 2025    Scalp hematoma 2025    Contusion of back 2025    Ecchymosis 2025    Falls 2025    Chest pain 2025    Abdominal pain 2025    Acute kidney injury (HCC) 2025    Gram-negative bacteremia 2025    Acute respiratory failure (HCC) 2025    Carcinoid tumor of pancreas 2025    Hypomagnesemia 2025    Upper GI bleed 2025    Acute anemia 2025    Rectal bleeding 2025    Acute blood loss anemia 2025    Chronic, continuous use of opioids 2025    Acute metabolic encephalopathy 2025    SIRS (systemic inflammatory response syndrome) (HCC) 2025    Sacral fracture, closed (HCC) 11/15/2024    KISHORE (acute kidney injury) (HCC) 10/04/2024    Morbid obesity with BMI of 40.0-44.9, adult (HCC) 10/04/2024    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) 2024    Fall from standing 2024    Closed compression fracture of L1 lumbar vertebra, sequela 2024    Closed stable burst fracture of first lumbar vertebra with routine healing 2024    Groin hematoma 2024    Ambulatory dysfunction 2024    Intractable back pain 2024    Hyperammonemia (HCC) 2024    Hepatic encephalopathy (HCC) 2024    Fall 2024    Liver cirrhosis secondary to HYATT (nonalcoholic steatohepatitis) (HCC) 2024    History of malignant carcinoid tumor of small intestine 2024    Class 3 severe obesity due to excess calories in adult 2024    Primary hypertension 2024    Hypokalemia 2024    Fibromyalgia 2024    Iron  "deficiency anemia 05/26/2024    Pancytopenia (HCC) 05/26/2024    Sarcoidosis 05/26/2024    Bilateral pulmonary infiltrates 05/26/2024      LOS (days): 3  Geometric Mean LOS (GMLOS) (days): 3.3  Days to GMLOS:0.6     OBJECTIVE:  Risk of Unplanned Readmission Score: 69.46         Current admission status: Inpatient   Preferred Pharmacy:   [facility] [address] [place] [address] [address] [facility]  [address] [facility] [address]  Phone: [phone] Fax: [phone]    [facility]/pharmacy #[id number] - [place], [place] - [address] [facility]  [address] [facility] [address]  Phone: [phone] Fax: [phone]    Primary Care Provider: Nayely Brown DO    Primary Insurance: MEDICARE  Secondary Insurance: BLUE CROSS    DISCHARGE DETAILS:    Discharge planning discussed with:: patient      Pts primary insurance verified through inpatient insurance verification message received \" This Ins was verified prior and CBC is stating MC A primary and CBC  secondary. Mc A  does verify active , Please see Acct notes,\"   Patient is aware, Medicare A is primary and Blue Cross is secondary, pt verbalized understanding and agreeable.     Pt expressed after talking with family, she wants to go to [facility] instead of [facility] because [facility] is closer to home. Referral placed to [facility] to check bed availability. Pending acceptance at [facility].   Pt is agreeable to [facility] if there is no beds at [facility]. Waiting to hear back from [facility].   [facility] questioning medications cost of Xifaxan and Sandostatin, per pt she gets a Sandostatin injection once a month at oncology office in [place] and takes Xifaxan BID. [facility] does not have a private room. Discussed with patient and spouse at bedside. Pt agreeable to go to [facility] for STR.     Spoke with [facility]; they have a private room for pt, bariatric bed, they are able to accept today. Pt requesting  van " transport to [facility]. Discussed with patient that transportation via WC is not covered by insurance and patient will be billed for this service. Patient verbalized understanding.         Transport requested via [facility]c van for 1500 to [place]. [facility] confirmed transport via [facility] van at 1630 to [place]. Patient and SNF aware of confirmed transport.              CM spoke to patient at the bedside, reviewed DC IMM with patient and informed that patient can stay an additional 4 hours for reconsidering appealing the discharge as the medicare rights were review on the day of discharge. Pt verbalized understanding and feels ready to go to [facility] and does not intend to stay 4 hours to reconsider.

## 2025-05-28 NOTE — ASSESSMENT & PLAN NOTE
Lab Results   Component Value Date    EGFR 62 05/28/2025    EGFR 68 05/27/2025    EGFR 35 05/26/2025    CREATININE 1.02 05/28/2025    CREATININE 0.94 05/27/2025    CREATININE 1.63 (H) 05/26/2025

## 2025-05-28 NOTE — ASSESSMENT & PLAN NOTE
Presented with flank pain after fall early this morning  Creatinine 2.05 from baseline 1.04-creatinine is now back to baseline  Possible secondary to HRS versus prerenal with poor oral intake  Torsemide and spironolactone on hold  Trend creatinine  Appreciate nephrology consultation  CT chest abdomen pelvis no evidence of obstructive disease  Resume Aldactone 25 mg daily and decrease dose of torsemide to 20 mg once daily and restart.  So far renal function is stable

## 2025-05-28 NOTE — PLAN OF CARE
Problem: PAIN - ADULT  Goal: Verbalizes/displays adequate comfort level or baseline comfort level  Description: Interventions:  - Encourage patient to monitor pain and request assistance  - Assess pain using appropriate pain scale  - Administer analgesics as ordered based on type and severity of pain and evaluate response  - Implement non-pharmacological measures as appropriate and evaluate response  - Consider cultural and social influences on pain and pain management  - Notify physician/advanced practitioner if interventions unsuccessful or patient reports new pain  - Educate patient/family on pain management process including their role and importance of  reporting pain   - Provide non-pharmacologic/complimentary pain relief interventions  Outcome: Progressing     Problem: INFECTION - ADULT  Goal: Absence or prevention of progression during hospitalization  Description: INTERVENTIONS:  - Assess and monitor for signs and symptoms of infection  - Monitor lab/diagnostic results  - Monitor all insertion sites, i.e. indwelling lines, tubes, and drains  - Monitor endotracheal if appropriate and nasal secretions for changes in amount and color  - [place] appropriate cooling/warming therapies per order  - Administer medications as ordered  - Instruct and encourage patient and family to use good hand hygiene technique  - Identify and instruct in appropriate isolation precautions for identified infection/condition  Outcome: Progressing  Goal: Absence of fever/infection during neutropenic period  Description: INTERVENTIONS:  - Monitor WBC  - Perform strict hand hygiene  - Limit to healthy visitors only  - No plants, dried, fresh or silk flowers with ortiz in patient room  Outcome: Progressing     Problem: SAFETY ADULT  Goal: Patient will remain free of falls  Description: INTERVENTIONS:  - Educate patient/family on patient safety including physical limitations  - Instruct patient to call for assistance with activity   -  Consider consulting OT/PT to assist with strengthening/mobility based on AM PAC & JH-HLM score  - Consult OT/PT to assist with strengthening/mobility   - Keep Call bell within reach  - Keep bed low and locked with side rails adjusted as appropriate  - Keep care items and personal belongings within reach  - Initiate and maintain comfort rounds  - Make Fall Risk Sign visible to staff  - Offer Toileting every  Hours, in advance of need  - Initiate/Maintain alarm  - Obtain necessary fall risk management equipment:   - Apply yellow socks and bracelet for high fall risk patients  - Consider moving patient to room near nurses station  Outcome: Progressing  Goal: Maintain or return to baseline ADL function  Description: INTERVENTIONS:  -  Assess patient's ability to carry out ADLs; assess patient's baseline for ADL function and identify physical deficits which impact ability to perform ADLs (bathing, care of mouth/teeth, toileting, grooming, dressing, etc.)  - Assess/evaluate cause of self-care deficits   - Assess range of motion  - Assess patient's mobility; develop plan if impaired  - Assess patient's need for assistive devices and provide as appropriate  - Encourage maximum independence but intervene and supervise when necessary  - Involve family in performance of ADLs  - Assess for home care needs following discharge   - Consider OT consult to assist with ADL evaluation and planning for discharge  - Provide patient education as appropriate  - Monitor functional capacity and physical performance, use of AM PAC & JH-HLM   - Monitor gait, balance and fatigue with ambulation    Outcome: Progressing  Goal: Maintains/Returns to pre admission functional level  Description: INTERVENTIONS:  - Perform AM-PAC 6 Click Basic Mobility/ Daily Activity assessment daily.  - Set and communicate daily mobility goal to care team and patient/family/caregiver.   - Collaborate with rehabilitation services on mobility goals if consulted  -  Perform Range of Motion  times a day.  - Reposition patient every  hours.  - Dangle patient  times a day  - Stand patient  times a day  - Ambulate patient  times a day  - Out of bed to chair  times a day   - Out of bed for meals  times a day  - Out of bed for toileting  - Record patient progress and toleration of activity level   Outcome: Progressing     Problem: DISCHARGE PLANNING  Goal: Discharge to home or other facility with appropriate resources  Description: INTERVENTIONS:  - Identify barriers to discharge w/patient and caregiver  - Arrange for needed discharge resources and transportation as appropriate  - Identify discharge learning needs (meds, wound care, etc.)  - Arrange for interpretive services to assist at discharge as needed  - Refer to Case Management Department for coordinating discharge planning if the patient needs post-hospital services based on physician/advanced practitioner order or complex needs related to functional status, cognitive ability, or social support system  Outcome: Progressing     Problem: Knowledge Deficit  Goal: Patient/family/caregiver demonstrates understanding of disease process, treatment plan, medications, and discharge instructions  Description: Complete learning assessment and assess knowledge base.  Interventions:  - Provide teaching at level of understanding  - Provide teaching via preferred learning methods  Outcome: Progressing     Problem: Prexisting or High Potential for Compromised Skin Integrity  Goal: Skin integrity is maintained or improved  Description: INTERVENTIONS:  - Identify patients at risk for skin breakdown  - Assess and monitor skin integrity including under and around medical devices   - Assess and monitor nutrition and hydration status  - Monitor labs  - Assess for incontinence   - Turn and reposition patient  - Assist with mobility/ambulation  - Relieve pressure over mariano prominences   - Avoid friction and shearing  - Provide appropriate hygiene  as needed including keeping skin clean and dry  - Evaluate need for skin moisturizer/barrier cream  - Collaborate with interdisciplinary team  - Patient/family teaching  - Consider wound care consult    Assess:  - Review Royce scale daily  - Clean and moisturize skin every   - Inspect skin when repositioning, toileting, and assisting with ADLS  - Assess under medical devices such as  every   - Assess extremities for adequate circulation and sensation     Bed Management:  - Have minimal linens on bed & keep smooth, unwrinkled  - Change linens as needed when moist or perspiring  - Avoid sitting or lying in one position for more than  hours while in bed?Keep HOB at degrees   - Toileting:  - Offer bedside commode  - Assess for incontinence every   - Use incontinent care products after each incontinent episode such as     Activity:  - Mobilize patient  times a day  - Encourage activity and walks on unit  - Encourage or provide ROM exercises   - Turn and reposition patient every  Hours  - Use appropriate equipment to lift or move patient in bed  - Instruct/ Assist with weight shifting every  when out of bed in chair  - Consider limitation of chair time  hour intervals    Skin Care:  - Avoid use of baby powder, tape, friction and shearing, hot water or constrictive clothing  - Relieve pressure over bony prominences using   - Do not massage red bony areas    Next Steps:  - Teach patient strategies to minimize risks such as   - Consider consults to  interdisciplinary teams such as   Outcome: Progressing     Problem: MUSCULOSKELETAL - ADULT  Goal: Maintain or return mobility to safest level of function  Description: INTERVENTIONS:  - Assess patient's ability to carry out ADLs; assess patient's baseline for ADL function and identify physical deficits which impact ability to perform ADLs (bathing, care of mouth/teeth, toileting, grooming, dressing, etc.)  - Assess/evaluate cause of self-care deficits   - Assess range of  motion  - Assess patient's mobility  - Assess patient's need for assistive devices and provide as appropriate  - Encourage maximum independence but intervene and supervise when necessary  - Involve family in performance of ADLs  - Assess for home care needs following discharge   - Consider OT consult to assist with ADL evaluation and planning for discharge  - Provide patient education as appropriate  Outcome: Progressing  Goal: Maintain proper alignment of affected body part  Description: INTERVENTIONS:  - Support, maintain and protect limb and body alignment  - Provide patient/ family with appropriate education  Outcome: Progressing

## 2025-05-28 NOTE — DISCHARGE SUMMARY
Discharge Summary - Hospitalist   Name: Bailey Olsen 54 y.o. female I MRN: [id number]  Unit/Bed#: -01 I Date of Admission: 5/25/2025   Date of Service: 5/28/2025 I Hospital Day: 3     Assessment & Plan  KISHORE (acute kidney injury) (HCC)  Presented with flank pain after fall early this morning  Creatinine 2.05 from baseline 1.04-creatinine is now back to baseline  Possible secondary to HRS versus prerenal with poor oral intake  Torsemide and spironolactone on hold  Trend creatinine  Appreciate nephrology consultation  CT chest abdomen pelvis no evidence of obstructive disease  Resume Aldactone 25 mg daily and decrease dose of torsemide to 20 mg once daily and restart.  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   Treated with Sandostatin every 4 weeks, received on 5/13   Follows with [facility] oncology   Pancytopenia (HCC)  Chronic secondary to advanced liver disease  Appears baseline  Morbid obesity with BMI of 40.0-44.9, adult  ([facility])  BMI 44  Morbid obesity complicating all aspects of healthcare  Requires intensive lifestyle modification    Falls  Fall at home in bathroom at 3 AM on day of admission   Trauma evaluation revealed scalp hematoma appreciate PT/OT/case management prior to discharge  Old rib fractures noted on trauma evaluation  needs subacute rehab   Scalp hematoma  S/p fall in bathroom  CT brain- Small right occipital scalp hematoma without evidence for fracture.   Neuroexam nonfocal  Contusion of back  Fall in the bathroom at 3 AM the morning of presentation  Noted to have large ecchymotic area thoracic spine  Hold anticoagulation  hemoglobin stable at 8.8  Stage 3a chronic kidney disease (HCC)  Lab Results   Component Value Date    EGFR 62 05/28/2025    EGFR 68 05/27/2025    EGFR 35 05/26/2025    CREATININE 1.02 05/28/2025    CREATININE 0.94 05/27/2025    CREATININE 1.63 (H) 05/26/2025        Medical Problems       Resolved Problems  Date Reviewed: 5/4/2025   None       Discharging Physician / Practitioner: Idalia Stuart MD  PCP: Nayely Brown DO  Admission Date:   Admission Orders (From admission, onward)       Ordered        05/25/25 1949  INPATIENT ADMISSION  Once                          Discharge Date: 05/28/25    Next Steps for Physician/AP Assuming Care:  CBC, CMP and 10 to 14 days    Test Results Pending at Discharge (will require follow up):  none    Medication Changes for Discharge & Rationale:   See after visit summary for reconciled discharge medications provided to patient and/or family.     Consultations During Hospital Stay:  Nephrology, physical therapy and Occupational Therapy and GI    Procedures Performed:   none    Significant Findings / Test Results:   CT recon only thoracolumbar (no charge)  Result Date: 5/25/2025  Impression: 1. No acute fracture or traumatic subluxation in the thoracolumbar spine. 2. Stable remote anterior wedge compression fracture L1 vertebral body status post percutaneous  vertebral augmentation. Workstation performed: [id number]     CT chest abdomen pelvis wo contrast  Result Date: 5/25/2025  Impression: 1. No acute abnormality identified in the chest, abdomen, or pelvis. 2. Healing right fourth and 10th rib fractures. 3. Cirrhosis with small amount of perihepatic ascites and splenomegaly consistent with portal hypertension. 4. Stable mild nonspecific groundglass opacity left upper lobe likely postinflammatory. 5. Additional findings as noted. The study was marked in EPIC for immediate notification. Computerized Assisted Algorithm (CAA) may have aided analysis of applicable images. Workstation performed: [id number]     CT spine cervical without contrast  Result Date: 5/25/2025  Impression: No cervical spine fracture or traumatic malalignment. The study was marked in EPIC for immediate notification. Workstation performed: [id number]     CT head without contrast  Result Date: 5/25/2025  Impression: 1. Small right occipital scalp hematoma without evidence for fracture. 2. No acute intracranial abnormality identified. The study was marked in EPIC for immediate notification. Workstation performed: [id number]      Incidental Findings:       Hospital Course:   Bailey Olsen is a 54 y.o. female patient who originally presented to the hospital on 5/25/2025 due to acute kidney injury, liver cirrhosis secondary to [place], history of malignant carcinoid tumor of the small intestine and falls at home.  Patient was treated with IV fluid hydration and resumption of lactulose and seen by physical therapy and Occupational Therapy and recommended subacute rehab.  Will discharge her to rehab today with outpatient follow-up with her primary team      Please see above list of diagnoses and related plan for additional information.     Discharge Day Visit / Exam:   Subjective: Patient denies any chest pain or shortness of breath still complaining of some back pain and generalized weakness  Vitals: Blood  "Pressure: 127/66 (05/28/25 0718)  Pulse: 88 (05/28/25 0718)  Temperature: (!) 97.3 °F (36.3 °C) (05/28/25 0718)  Temp Source: Temporal (05/26/25 2350)  Respirations: 18 (05/28/25 0718)  Height: 5' 5\" (165.1 cm) (05/25/25 2021)  Weight - Scale: 121 kg (266 lb 12.1 oz) (05/25/25 1820)  SpO2: 96 % (05/28/25 0718)  Physical Exam  Vitals and nursing note reviewed.   Constitutional:       Appearance: Normal appearance.   HENT:      Head: Normocephalic and atraumatic.      Right Ear: External ear normal.      Left Ear: External ear normal.      Nose: Nose normal.      Mouth/Throat:      Pharynx: Oropharynx is clear.     Eyes:      Pupils: Pupils are equal, round, and reactive to light.       Cardiovascular:      Rate and Rhythm: Normal rate and regular rhythm.      Heart sounds: Normal heart sounds.   Pulmonary:      Effort: Pulmonary effort is normal.      Breath sounds: Normal breath sounds.   Abdominal:      General: Bowel sounds are normal.      Palpations: Abdomen is soft.      Tenderness: There is no abdominal tenderness.     Musculoskeletal:         General: Normal range of motion.      Cervical back: Normal range of motion and neck supple.     Skin:     General: Skin is warm and dry.      Capillary Refill: Capillary refill takes less than 2 seconds.      Findings: Bruising present.     Neurological:      General: No focal deficit present.      Mental Status: She is alert and oriented to person, place, and time.     Psychiatric:         Mood and Affect: Mood normal.            Discussion with Family: Update family    Discharge instructions/Information to patient and family:   See after visit summary for information provided to patient and family.      Provisions for Follow-Up Care:  See after visit summary for information related to follow-up care and any pertinent home health orders.      Mobility at time of Discharge:   Basic Mobility Inpatient Raw Score: 15  -HLM Goal: 4: Move to chair/commode  -HLM Achieved: " 4: Move to chair/commode  HLM Goal NOT achieved. Continue to encourage mobility in post discharge setting.     Disposition:   Other Skilled Nursing Facility at      Planned Readmission: none    Administrative Statements   Discharge Statement:  I have spent a total time of 35 minutes in caring for this patient on the day of the visit/encounter. .    **Please Note: This note may have been constructed using a voice recognition system**

## 2025-05-28 NOTE — ASSESSMENT & PLAN NOTE
History HYATT liver cirrhosis with hyperammonemia, no confusion or tremors  Lactulose 30 g 4 times daily, rifaximin twice daily  Torsemide and spironolactone on hold  Not currently taking beta-blockade  Monitor MELD labs  MELDS-NA=17  No significant ascites on abdominal exam  No history esophageal varices  Continue Protonix   Resume Diuretics cautiously

## 2025-05-28 NOTE — DISCHARGE INSTR - AVS FIRST PAGE
Decrease torsemide 20 mg daily: Dose to once daily and only take twice daily if she gains 2 pounds in 2 consecutive days i.e. 4 pounds in 2 days.

## 2025-05-29 NOTE — UTILIZATION REVIEW
NOTIFICATION OF ADMISSION DISCHARGE   This is a Notification of Discharge from [facility]. Please be advised that this patient has been discharge from our facility. Below you will find the admission and discharge date and time including the patient’s disposition.   UTILIZATION REVIEW CONTACT:  Utilization Review Assistants  Network Utilization Review Department  Phone: [phone] x carefully listen to the prompts. All voicemails are confidential.  Email: [email or web address]     ADMISSION INFORMATION  PRESENTATION DATE: 5/25/2025  6:17 PM  OBERVATION ADMISSION DATE: N/A  INPATIENT ADMISSION DATE: 5/25/25  7:49 PM   DISCHARGE DATE: 5/28/2025  5:06 PM   DISPOSITION:Non [facility] SNF/TCU/SNU    Network Utilization Review Department  ATTENTION: Please call with any questions or concerns to [phone] and carefully listen to the prompts so that you are directed to the right person. All voicemails are confidential.   For Discharge needs, contact Care Management DC Support Team at [phone] opt. 2  Send all requests for admission clinical reviews, approved or denied determinations and any other requests to dedicated fax number below belonging to the campus where the patient is receiving treatment. List of dedicated fax numbers for the Facilities:  FACILITY NAME UR FAX NUMBER   ADMISSION DENIALS (Administrative/Medical Necessity) [phone]   DISCHARGE SUPPORT TEAM ([facility]) [phone]   PARENT CHILD HEALTH (Maternity/NICU/Pediatrics) [phone]   [facility] [phone]   [facility] [phone]   [facility] [phone]   [facility] [phone]   [facility] [phone]   [facility] [phone]   [facility] [phone]   [facility] [phone]   [facility]  [facility] [phone]   [facility] [phone]   [facility] [phone]   [facility] [phone]

## 2025-06-14 ENCOUNTER — APPOINTMENT (EMERGENCY)
Dept: CT IMAGING | Facility: HOSPITAL | Age: 55
DRG: 642 | End: 2025-06-14
Payer: MEDICARE

## 2025-06-14 ENCOUNTER — APPOINTMENT (EMERGENCY)
Dept: RADIOLOGY | Facility: HOSPITAL | Age: 55
DRG: 642 | End: 2025-06-14
Payer: MEDICARE

## 2025-06-14 ENCOUNTER — HOSPITAL ENCOUNTER (INPATIENT)
Facility: HOSPITAL | Age: 55
LOS: 2 days | Discharge: HOME HEALTH | DRG: 642 | End: 2025-06-17
Attending: EMERGENCY MEDICINE | Admitting: FAMILY MEDICINE
Payer: MEDICARE

## 2025-06-14 DIAGNOSIS — K75.81 LIVER CIRRHOSIS SECONDARY TO NASH (NONALCOHOLIC STEATOHEPATITIS) (HCC): ICD-10-CM

## 2025-06-14 DIAGNOSIS — R41.82 ALTERED MENTAL STATUS, UNSPECIFIED ALTERED MENTAL STATUS TYPE: ICD-10-CM

## 2025-06-14 DIAGNOSIS — E83.42 HYPOMAGNESEMIA: ICD-10-CM

## 2025-06-14 DIAGNOSIS — K74.60 LIVER CIRRHOSIS SECONDARY TO NASH (NONALCOHOLIC STEATOHEPATITIS) (HCC): ICD-10-CM

## 2025-06-14 DIAGNOSIS — E87.6 HYPOKALEMIA: ICD-10-CM

## 2025-06-14 DIAGNOSIS — K59.00 CONSTIPATION: ICD-10-CM

## 2025-06-14 DIAGNOSIS — R41.82 ALTERED MENTAL STATUS: Primary | ICD-10-CM

## 2025-06-14 LAB
AMMONIA PLAS-SCNC: 135 UMOL/L (ref 18–72)
ANION GAP SERPL CALCULATED.3IONS-SCNC: 7 MMOL/L (ref 4–13)
ANION GAP SERPL CALCULATED.3IONS-SCNC: 9 MMOL/L (ref 4–13)
APTT PPP: 37 SECONDS (ref 23–34)
BUN SERPL-MCNC: 12 MG/DL (ref 5–25)
BUN SERPL-MCNC: 12 MG/DL (ref 5–25)
CALCIUM SERPL-MCNC: 8.4 MG/DL (ref 8.4–10.2)
CALCIUM SERPL-MCNC: 8.5 MG/DL (ref 8.4–10.2)
CARDIAC TROPONIN I PNL SERPL HS: 17 NG/L (ref ?–50)
CHLORIDE SERPL-SCNC: 101 MMOL/L (ref 96–108)
CHLORIDE SERPL-SCNC: 97 MMOL/L (ref 96–108)
CO2 SERPL-SCNC: 32 MMOL/L (ref 21–32)
CO2 SERPL-SCNC: 35 MMOL/L (ref 21–32)
CREAT SERPL-MCNC: 0.95 MG/DL (ref 0.6–1.3)
CREAT SERPL-MCNC: 1.03 MG/DL (ref 0.6–1.3)
ERYTHROCYTE [DISTWIDTH] IN BLOOD BY AUTOMATED COUNT: 17.9 % (ref 11.6–15.1)
GFR SERPL CREATININE-BSD FRML MDRD: 61 ML/MIN/1.73SQ M
GFR SERPL CREATININE-BSD FRML MDRD: 67 ML/MIN/1.73SQ M
GLUCOSE SERPL-MCNC: 105 MG/DL (ref 65–140)
GLUCOSE SERPL-MCNC: 109 MG/DL (ref 65–140)
GLUCOSE SERPL-MCNC: 127 MG/DL (ref 65–140)
HCT VFR BLD AUTO: 29.9 % (ref 34.8–46.1)
HGB BLD-MCNC: 9.6 G/DL (ref 11.5–15.4)
INR PPP: 1.78 (ref 0.85–1.19)
MAGNESIUM SERPL-MCNC: 1.7 MG/DL (ref 1.9–2.7)
MCH RBC QN AUTO: 29.3 PG (ref 26.8–34.3)
MCHC RBC AUTO-ENTMCNC: 32.1 G/DL (ref 31.4–37.4)
MCV RBC AUTO: 91 FL (ref 82–98)
PLATELET # BLD AUTO: 57 THOUSANDS/UL (ref 149–390)
PMV BLD AUTO: 9.5 FL (ref 8.9–12.7)
POTASSIUM SERPL-SCNC: 2.8 MMOL/L (ref 3.5–5.3)
POTASSIUM SERPL-SCNC: 2.9 MMOL/L (ref 3.5–5.3)
PROTHROMBIN TIME: 21 SECONDS (ref 12.3–15)
RBC # BLD AUTO: 3.28 MILLION/UL (ref 3.81–5.12)
SODIUM SERPL-SCNC: 140 MMOL/L (ref 135–147)
SODIUM SERPL-SCNC: 141 MMOL/L (ref 135–147)
WBC # BLD AUTO: 2.78 THOUSAND/UL (ref 4.31–10.16)

## 2025-06-14 PROCEDURE — 99223 1ST HOSP IP/OBS HIGH 75: CPT | Performed by: FAMILY MEDICINE

## 2025-06-14 PROCEDURE — 80048 BASIC METABOLIC PNL TOTAL CA: CPT | Performed by: FAMILY MEDICINE

## 2025-06-14 PROCEDURE — 85730 THROMBOPLASTIN TIME PARTIAL: CPT | Performed by: EMERGENCY MEDICINE

## 2025-06-14 PROCEDURE — 70498 CT ANGIOGRAPHY NECK: CPT

## 2025-06-14 PROCEDURE — 84484 ASSAY OF TROPONIN QUANT: CPT | Performed by: EMERGENCY MEDICINE

## 2025-06-14 PROCEDURE — 99285 EMERGENCY DEPT VISIT HI MDM: CPT | Performed by: EMERGENCY MEDICINE

## 2025-06-14 PROCEDURE — 83735 ASSAY OF MAGNESIUM: CPT | Performed by: EMERGENCY MEDICINE

## 2025-06-14 PROCEDURE — 70496 CT ANGIOGRAPHY HEAD: CPT

## 2025-06-14 PROCEDURE — 80048 BASIC METABOLIC PNL TOTAL CA: CPT | Performed by: EMERGENCY MEDICINE

## 2025-06-14 PROCEDURE — 85610 PROTHROMBIN TIME: CPT | Performed by: EMERGENCY MEDICINE

## 2025-06-14 PROCEDURE — 96365 THER/PROPH/DIAG IV INF INIT: CPT

## 2025-06-14 PROCEDURE — 82948 REAGENT STRIP/BLOOD GLUCOSE: CPT

## 2025-06-14 PROCEDURE — 36415 COLL VENOUS BLD VENIPUNCTURE: CPT | Performed by: EMERGENCY MEDICINE

## 2025-06-14 PROCEDURE — 82140 ASSAY OF AMMONIA: CPT | Performed by: EMERGENCY MEDICINE

## 2025-06-14 PROCEDURE — 93005 ELECTROCARDIOGRAM TRACING: CPT

## 2025-06-14 PROCEDURE — 99285 EMERGENCY DEPT VISIT HI MDM: CPT

## 2025-06-14 PROCEDURE — 85027 COMPLETE CBC AUTOMATED: CPT | Performed by: EMERGENCY MEDICINE

## 2025-06-14 PROCEDURE — 71045 X-RAY EXAM CHEST 1 VIEW: CPT

## 2025-06-14 PROCEDURE — 96374 THER/PROPH/DIAG INJ IV PUSH: CPT

## 2025-06-14 RX ORDER — POTASSIUM CHLORIDE 1500 MG/1
20 TABLET, EXTENDED RELEASE ORAL 2 TIMES DAILY
COMMUNITY

## 2025-06-14 RX ORDER — PANTOPRAZOLE SODIUM 40 MG/1
40 TABLET, DELAYED RELEASE ORAL
Status: DISCONTINUED | OUTPATIENT
Start: 2025-06-15 | End: 2025-06-17 | Stop reason: HOSPADM

## 2025-06-14 RX ORDER — MAGNESIUM SULFATE HEPTAHYDRATE 40 MG/ML
2 INJECTION, SOLUTION INTRAVENOUS ONCE
Status: COMPLETED | OUTPATIENT
Start: 2025-06-14 | End: 2025-06-15

## 2025-06-14 RX ORDER — ACETAMINOPHEN 325 MG/1
650 TABLET ORAL EVERY 6 HOURS PRN
Status: DISCONTINUED | OUTPATIENT
Start: 2025-06-14 | End: 2025-06-16

## 2025-06-14 RX ORDER — POTASSIUM CHLORIDE 14.9 MG/ML
20 INJECTION INTRAVENOUS
Status: COMPLETED | OUTPATIENT
Start: 2025-06-14 | End: 2025-06-15

## 2025-06-14 RX ORDER — LACTULOSE 10 G/15ML
30 SOLUTION ORAL 4 TIMES DAILY
Status: DISCONTINUED | OUTPATIENT
Start: 2025-06-14 | End: 2025-06-14

## 2025-06-14 RX ORDER — METOPROLOL TARTRATE 25 MG/1
25 TABLET, FILM COATED ORAL EVERY 12 HOURS SCHEDULED
COMMUNITY

## 2025-06-14 RX ORDER — LACTULOSE 10 G/15ML
30 SOLUTION ORAL
Status: DISCONTINUED | OUTPATIENT
Start: 2025-06-14 | End: 2025-06-15

## 2025-06-14 RX ORDER — MIDODRINE HYDROCHLORIDE 2.5 MG/1
2.5 TABLET ORAL
COMMUNITY
End: 2025-06-17

## 2025-06-14 RX ORDER — LIDOCAINE 50 MG/G
1 PATCH TOPICAL AS NEEDED
COMMUNITY

## 2025-06-14 RX ORDER — POLYETHYLENE GLYCOL 3350 17 G/17G
17 POWDER, FOR SOLUTION ORAL DAILY
Status: DISCONTINUED | OUTPATIENT
Start: 2025-06-15 | End: 2025-06-17 | Stop reason: HOSPADM

## 2025-06-14 RX ADMIN — LACTULOSE 30 G: 10 SOLUTION ORAL at 18:29

## 2025-06-14 RX ADMIN — SODIUM CHLORIDE 1000 ML: 0.9 INJECTION, SOLUTION INTRAVENOUS at 15:53

## 2025-06-14 RX ADMIN — RIFAXIMIN 550 MG: 550 TABLET ORAL at 21:12

## 2025-06-14 RX ADMIN — POTASSIUM CHLORIDE 20 MEQ: 14.9 INJECTION, SOLUTION INTRAVENOUS at 15:55

## 2025-06-14 RX ADMIN — MAGNESIUM SULFATE HEPTAHYDRATE 2 G: 40 INJECTION, SOLUTION INTRAVENOUS at 16:08

## 2025-06-14 RX ADMIN — POTASSIUM CHLORIDE 20 MEQ: 14.9 INJECTION, SOLUTION INTRAVENOUS at 21:12

## 2025-06-14 RX ADMIN — IOHEXOL 100 ML: 350 INJECTION, SOLUTION INTRAVENOUS at 15:24

## 2025-06-14 RX ADMIN — POTASSIUM CHLORIDE 20 MEQ: 14.9 INJECTION, SOLUTION INTRAVENOUS at 18:28

## 2025-06-14 NOTE — ED PROVIDER NOTES
Per Dr. Priscila Medeiros, patient is scheduled for laparoscopic possible robotic cholecystectomy with intraoperative cholangiogram at [address] [place] [address] on 8/6/20. Surgery scheduling form faxed with confirmation, surgeon's calendar updated. Dr. Priscila Medeiros to enter orders. Medical and Cardiac clearance requested from Dr. Jamie Blackman and Dr. Pratima Lima. Follow up appointment scheduled. Per Medicare a/b guidelines, no precertification is required for scheduled outpatient procedure. Electronically signed by Elvis Nelson RN on 7/20/2020 at 12:06 PM    Cardiac clearance received from Dr. Pratima Lima. See note in epic on 7/28/20. Dr. Loren Mock office contacted in regards to medical clearance. Clearance form faxed to office. Electronically signed by Elvis Nelson RN on 7/30/2020 at 9:07 AM    [address] [address] [place] [address] DR. Cooper Mckeon.   Electronically signed by Elvis Nelson RN on 7/30/2020 at 2:21 PM Time reflects when diagnosis was documented in both MDM as applicable and the Disposition within this note       Time User Action Codes Description Comment    6/14/2025  3:11 PM Rocael Pérez Add [R41.82] Altered mental status, unspecified altered mental status type     6/14/2025  3:11 PM Rocael Pérez Modify [R41.82] Altered mental status, unspecified altered mental status type     6/14/2025  3:11 PM Rocael Pérez Add [R41.82] Altered mental status     6/14/2025  4:21 PM Rocael Pérez Add [E83.42] Hypomagnesemia     6/14/2025  4:21 PM Rocael Pérez Add [E87.6] Hypokalemia           ED Disposition       ED Disposition   Admit    Condition   Stable    Date/Time   Sat Jun 14, 2025  4:20 PM    Comment   Case was discussed with Dr. Stuart and the patient's admission status was agreed to be Admission Status: observation status to the service of Dr. Stuart .               Assessment & Plan       Medical Decision Making  54-year-old female presenting for evaluation of lower extremity weakness.  Vital signs are stable, patient appears confused but breathing comfortably in room.  Patient has a complex medical history, suspect potential hyperammonemia versus acute CVA.  Per sister last known normal approximately 8 AM this morning.  Stroke alert called on arrival, patient currently pending labs and CT for further evaluation.    Amount and/or Complexity of Data Reviewed  Labs: ordered. Decision-making details documented in ED Course.  Radiology: ordered.    Risk  Prescription drug management.  Decision regarding hospitalization.      4:21 PM    On reevaluation, patient continues to be emotional in room, however remains in no acute distress, saturating 100% on room air and appears somewhat anxious.  Case was discussed with stroke neurology team, no large vessel occlusion appreciated or current clear indication for thrombolytics.  Patient's sister and daughter in room currently, were updated regarding plans for admission to hospitalist  for further monitoring and management considering her altered mental status in the setting of complex medical comorbidities.  Chest x-ray completed does not show any clear pneumothorax or focal consolidation by my read.  Patient is afebrile.  Plan to admit to hospitalist for further monitoring and management.    ED Course as of 06/14/25 1621   Sat Jun 14, 2025   1509 Stroke alert called due to AMS   1541 Potassium(!): 2.8   1542 Platelet Count(!): 57  Appears within general baseline (HYATT history)   1544 Ammonia(!): 135  Appears within baseline   1545 Case being discussed with stroke neurology via chat, reports no significant LVO   1557 Hemoglobin(!): 9.6  Chronic anemia     1557 MAGNESIUM(!): 1.7  Will replete   1610 hs TnI 0hr: 17  Awaiting delta. ECG shows nsr, rate 105, some STD in lateral leads without reciprocal elevations       Medications   potassium chloride 20 mEq IVPB (premix) (20 mEq Intravenous New Bag 6/14/25 1555)   magnesium sulfate 2 g/50 mL IVPB (premix) 2 g (2 g Intravenous New Bag 6/14/25 1608)   iohexol (OMNIPAQUE) 350 MG/ML injection (MULTI-DOSE) 100 mL (100 mL Intravenous Given 6/14/25 1524)   sodium chloride 0.9 % bolus 1,000 mL (1,000 mL Intravenous New Bag 6/14/25 1553)       ED Risk Strat Scores                    No data recorded                            History of Present Illness       Chief Complaint   Patient presents with    Altered Mental Status     Per EMS patient was at [facility] for physical therapy. Came home yesterday.  states that today when he came home from work he noticed she was confused, and unable to standup. Not c/o pain at this time. Per patients , her last known normal was last night.        Past Medical History[1]   Past Surgical History[2]   Family History[3]   Social History[4]   E-Cigarette/Vaping    E-Cigarette Use Never User       E-Cigarette/Vaping Substances    Nicotine No     THC No     CBD No     Flavoring No     Other No     Unknown No      "  I have reviewed and agree with the history as documented.         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  Patient is currently a poor historian.  Sister is present at bedside and states the patient was recently discharged from a rehab facility yesterday without any major complications, and that at approximately 8 AM this morning noted sudden onset of altered mental status and complaints of bilateral lower extremity weakness.  They deny any significant trauma or injury.  Reports she has been taking her medications as prescribed.  They do report history of patient having altered mental status particularly when her ammonia levels are high.  At bedside, patient reports she feels \"weak\" and unable to walk.  She denies recent fever, chills, nausea, vomiting, new or worsening back pain, vision changes, or changes in bowel or bladder habits.      Altered Mental Status  Presenting symptoms: confusion    Associated symptoms: weakness    Associated symptoms: no abdominal pain, no fever, no palpitations, no rash, no seizures and no vomiting        Review of Systems   Constitutional:  Negative for chills and fever.   HENT:  Negative for ear pain and sore throat.    Eyes:  Negative for pain and visual disturbance.   Respiratory:  Negative for cough and shortness of breath.    Cardiovascular:  Negative for chest pain and palpitations.   Gastrointestinal:  Negative for abdominal pain and vomiting.   Genitourinary:  Negative for dysuria and hematuria.   Musculoskeletal:  Positive for back pain (Chronic and unchanged). Negative for arthralgias.   Skin:  Negative for color change and rash.   Neurological:  Positive for weakness. Negative for seizures and syncope.   Psychiatric/Behavioral:  Positive for confusion.  "   All other systems reviewed and are negative.          Objective       ED Triage Vitals   Temperature Pulse Blood Pressure Respirations SpO2 Patient Position - Orthostatic VS   06/14/25 1506 06/14/25 1510 06/14/25 1510 06/14/25 1510 06/14/25 1510 06/14/25 1510   100 °F (37.8 °C) 104 143/66 15 96 % Lying      Temp Source Heart Rate Source BP Location FiO2 (%) Pain Score    06/14/25 1506 06/14/25 1510 06/14/25 1510 -- 06/14/25 1510    Temporal Monitor Left arm  No Pain      Vitals      Date and Time Temp Pulse SpO2 Resp BP Pain Score FACES Pain Rating User   06/14/25 1607 99.7 °F (37.6 °C) -- -- -- -- -- -- AS   06/14/25 1510 -- 104 96 % 15 143/66 No Pain -- PK   06/14/25 1506 100 °F (37.8 °C) -- -- -- -- -- -- PK            Physical Exam  Vitals and nursing note reviewed.   Constitutional:       General: She is not in acute distress.     Appearance: She is well-developed. She is obese.      Comments: Patient appears emotional, somewhat confused, breathing comfortably in no acute distress, slurred speech without tongue deviation   HENT:      Head: Normocephalic and atraumatic.     Eyes:      Extraocular Movements: Extraocular movements intact.      Conjunctiva/sclera: Conjunctivae normal.      Pupils: Pupils are equal, round, and reactive to light.      Comments: Left gaze deviation     Cardiovascular:      Rate and Rhythm: Normal rate and regular rhythm.      Heart sounds: No murmur heard.  Pulmonary:      Effort: Pulmonary effort is normal. No respiratory distress.      Breath sounds: Normal breath sounds.   Abdominal:      Palpations: Abdomen is soft.      Tenderness: There is no abdominal tenderness.     Musculoskeletal:         General: No swelling.      Cervical back: Neck supple.     Skin:     General: Skin is warm and dry.      Capillary Refill: Capillary refill takes less than 2 seconds.     Neurological:      Comments: Patient is minimally cooperative with exam secondary to presumed confusion, strength is 5  out of 5 in the bilateral upper and lower extremities, she is unable to perform finger-to-nose or heel-to-shin testing.  Sensation intact.       Results Reviewed       Procedure Component Value Units Date/Time    HS Troponin I 4hr [[id number]]     Lab Status: No result Specimen: Blood     Magnesium [[id number]]  (Abnormal) Collected: 06/14/25 1512    Lab Status: Final result Specimen: Blood from Arm, Right Updated: 06/14/25 1557     Magnesium 1.7 mg/dL     HS Troponin 0hr (reflex protocol) [[id number]]  (Normal) Collected: 06/14/25 1512    Lab Status: Final result Specimen: Blood from Arm, Right Updated: 06/14/25 1548     hs TnI 0hr 17 ng/L     HS Troponin I 2hr [[id number]]     Lab Status: No result Specimen: Blood     UA w Reflex to Microscopic w Reflex to Culture [[id number]]     Lab Status: No result Specimen: Urine     Basic metabolic panel [[id number]]  (Abnormal) Collected: 06/14/25 1512    Lab Status: Final result Specimen: Blood from Arm, Right Updated: 06/14/25 1540     Sodium 141 mmol/L      Potassium 2.8 mmol/L      Chloride 97 mmol/L      CO2 35 mmol/L      ANION GAP 9 mmol/L      BUN 12 mg/dL      Creatinine 1.03 mg/dL      Glucose 109 mg/dL      Calcium 8.5 mg/dL      eGFR 61 ml/min/1.73sq m     Narrative:      National Kidney Disease Foundation guidelines for Chronic Kidney Disease (CKD):     Stage 1 with normal or high GFR (GFR > 90 mL/min/1.73 square meters)    Stage 2 Mild CKD (GFR = 60-89 mL/min/1.73 square meters)    Stage 3A Moderate CKD (GFR = 45-59 mL/min/1.73 square meters)    Stage 3B Moderate CKD (GFR = 30-44 mL/min/1.73 square meters)    Stage 4 Severe CKD (GFR = 15-29 mL/min/1.73 square meters)    Stage 5 End Stage CKD (GFR <15 mL/min/1.73 square meters)  Note: GFR calculation is accurate only with a steady state creatinine    APTT [[id number]]  (Abnormal) Collected: 06/14/25 1512    Lab Status: Final result Specimen: Blood from Arm, Right Updated: 06/14/25 1539     PTT 37 seconds      Protime-INR [[id number]]  (Abnormal) Collected: 06/14/25 1512    Lab Status: Final result Specimen: Blood from Arm, Right Updated: 06/14/25 1539     Protime 21.0 seconds      INR 1.78    Narrative:      INR Therapeutic Range    Indication                                             INR Range      Atrial Fibrillation                                               2.0-3.0  Hypercoagulable State                                    2.0.2.3  Left Ventricular Asist Device                            2.0-3.0  Mechanical Heart Valve                                  -    Aortic(with afib, MI, embolism, HF, LA enlargement,    and/or coagulopathy)                                     2.0-3.0 (2.5-3.5)     Mitral                                                             2.5-3.5  Prosthetic/Bioprosthetic Heart Valve               2.0-3.0  Venous thromboembolism (VTE: VT, PE        2.0-3.0    Ammonia [[id number]]  (Abnormal) Collected: 06/14/25 1512    Lab Status: Final result Specimen: Blood from Arm, Right Updated: 06/14/25 1539     Ammonia 135 umol/L     CBC and Platelet [[id number]]  (Abnormal) Collected: 06/14/25 1512    Lab Status: Final result Specimen: Blood from Arm, Right Updated: 06/14/25 1530     WBC 2.78 Thousand/uL      RBC 3.28 Million/uL      Hemoglobin 9.6 g/dL      Hematocrit 29.9 %      MCV 91 fL      MCH 29.3 pg      MCHC 32.1 g/dL      RDW 17.9 %      Platelets 57 Thousands/uL      MPV 9.5 fL     Fingerstick Glucose (POCT) [[id number]]  (Normal) Collected: 06/14/25 1510    Lab Status: Final result Specimen: Blood Updated: 06/14/25 1511     POC Glucose 105 mg/dl             CT stroke alert brain   Final Interpretation by Raleigh Clark MD (06/14 6229)      No hemorrhage or CT evidence of an acute cortical infarct      Findings were discussed with Dr. Blunt on 6/14/25 at 3:34 PM      Workstation performed: [id number]         CTA stroke alert (head/neck)   Final Interpretation by Raleigh Clark MD (06/14 9704)   1.  No  intracranial large vessel occlusion or hemodynamically significant stenosis in the neck.   2.  Moderate narrowing at the P2-P3 junction of the left PCA.   3.  Unchanged groundglass mass in the left upper lobe.      Findings were discussed with Dr. Pérez on 6/14/25 at 3:37 PM.      Workstation performed: [id number]         XR chest 1 view portable    (Results Pending)       Procedures    ED Medication and Procedure Management   Prior to Admission Medications   Prescriptions Last Dose Informant Patient Reported? Taking?   amitriptyline (ELAVIL) 50 mg tablet   No No   Sig: Take 1 tablet (50 mg total) by mouth daily at bedtime   cyclobenzaprine (FLEXERIL) 10 mg tablet  Pharmacy (Specify) Yes No   Sig: Take 10 mg by mouth as needed in the morning and 10 mg as needed at noon and 10 mg as needed in the evening for muscle spasms.   dapsone 25 mg tablet   Yes No   Sig: Take 50 mg by mouth in the morning.   folic acid (FOLVITE) 1 mg tablet   Yes No   Sig: Take 1 mg by mouth daily   lactulose (CHRONULAC) 10 g/15 mL solution   No No   Sig: Take 45 mL (30 g total) by mouth 4 (four) times a day   pantoprazole (PROTONIX) 40 mg tablet   No No   Sig: Take 1 tablet (40 mg total) by mouth daily in the early morning   polyethylene glycol (MIRALAX) 17 g packet  Pharmacy (Specify) Yes No   Sig: Take 17 g by mouth daily as needed   rifaximin (XIFAXAN) 550 mg tablet   Yes No   Sig: Take 550 mg by mouth every 12 (twelve) hours   spironolactone (ALDACTONE) 25 mg tablet   No No   Sig: Take 1 tablet (25 mg total) by mouth daily   torsemide (DEMADEX) 20 mg tablet   No No   Sig: Take 1 tablet (20 mg total) by mouth daily   ursodiol (ACTIGALL) 300 mg capsule   Yes No   Sig: Take 300 mg by mouth in the morning and 300 mg in the evening.      Facility-Administered Medications: None     Patient's Medications   Discharge Prescriptions    No medications on file     No discharge procedures on file.  ED SEPSIS DOCUMENTATION   Time reflects when diagnosis  was documented in both MDM as applicable and the Disposition within this note       Time User Action Codes Description Comment    6/14/2025  3:11 PM Rocael Pérez Add [R41.82] Altered mental status, unspecified altered mental status type     6/14/2025  3:11 PM Rocael Pérez Modify [R41.82] Altered mental status, unspecified altered mental status type     6/14/2025  3:11 PM Rocael Pérez Add [R41.82] Altered mental status     6/14/2025  4:21 PM Rocael Pérez Add [E83.42] Hypomagnesemia     6/14/2025  4:21 PM Rcoael Pérez Add [E87.6] Hypokalemia                      [1]   Past Medical History:  Diagnosis Date    Back pain     Carcinoid tumor     neuroendocrine    CHF (congestive heart failure) (HCC)     Cirrhosis of liver (HCC)     Hypertension     Sarcoidosis    [2]   Past Surgical History:  Procedure Laterality Date    CHOLECYSTECTOMY      GASTRIC BYPASS      HERNIA REPAIR      IR KYPHOPLASTY/VERTEBROPLASTY  10/15/2024   [3]   Family History  Problem Relation Name Age of Onset    Hypertension Father      Heart attack Father      No Known Problems Sister      No Known Problems Brother      No Known Problems Brother      No Known Problems Brother      No Known Problems Brother      No Known Problems Brother      No Known Problems Daughter     [4]   Social History  Tobacco Use    Smoking status: Never    Smokeless tobacco: Never   Vaping Use    Vaping status: Never Used   Substance Use Topics    Alcohol use: Never    Drug use: Never        Rocael Pérez DO  06/14/25 1620

## 2025-06-14 NOTE — CONSULTS
Stroke alert note:  Called at 3:10 pm and neuro responded immediately.    The patient is 53 y/o with history of HYATT with liver cirrhosis and previous admissions for hepatic encephalopathy who presents today with confusion, generalized weakness and being emotional. This was noted upon awakening this morning at 0800 am. Patient does not show clear localizing findings as per ED MD    CT head shows no acute infarct  CTA shows stenosis of left P2    Ammonia is 135    TNK not given: outside window    Presentation appears to be consistent with metabolic encephalopathy. She has began lactulose treatment as per primary team. If mentation improves and returns to baseline, then no further work-up/treatment is needed for the encephalopathy or the asymptomatic stenosis of P2. If signs and symptoms persist or she develops new findings, then please call our service for evaluation.

## 2025-06-14 NOTE — ASSESSMENT & PLAN NOTE
Known history of liver cirrhosis with recurrent admissions due to hepatic encephalopathy and constipation.  Hold torsemide and Aldactone and focus mainly on lactulose and Xifaxan today

## 2025-06-14 NOTE — PLAN OF CARE
Problem: PAIN - ADULT  Goal: Verbalizes/displays adequate comfort level or baseline comfort level  Description: Interventions:  - Encourage patient to monitor pain and request assistance  - Assess pain using appropriate pain scale  - Administer analgesics as ordered based on type and severity of pain and evaluate response  - Implement non-pharmacological measures as appropriate and evaluate response  - Consider cultural and social influences on pain and pain management  - Notify physician/advanced practitioner if interventions unsuccessful or patient reports new pain  - Educate patient/family on pain management process including their role and importance of  reporting pain   - Provide non-pharmacologic/complimentary pain relief interventions  Outcome: Progressing     Problem: INFECTION - ADULT  Goal: Absence or prevention of progression during hospitalization  Description: INTERVENTIONS:  - Assess and monitor for signs and symptoms of infection  - Monitor lab/diagnostic results  - Monitor all insertion sites, i.e. indwelling lines, tubes, and drains  - Monitor endotracheal if appropriate and nasal secretions for changes in amount and color  - [place] appropriate cooling/warming therapies per order  - Administer medications as ordered  - Instruct and encourage patient and family to use good hand hygiene technique  - Identify and instruct in appropriate isolation precautions for identified infection/condition  Outcome: Progressing  Goal: Absence of fever/infection during neutropenic period  Description: INTERVENTIONS:  - Monitor WBC  - Perform strict hand hygiene  - Limit to healthy visitors only  - No plants, dried, fresh or silk flowers with ortiz in patient room  Outcome: Progressing     Problem: SAFETY ADULT  Goal: Patient will remain free of falls  Description: INTERVENTIONS:  - Educate patient/family on patient safety including physical limitations  - Instruct patient to call for assistance with activity   -  Consider consulting OT/PT to assist with strengthening/mobility based on AM PAC & JH-HLM score  - Consult OT/PT to assist with strengthening/mobility   - Keep Call bell within reach  - Keep bed low and locked with side rails adjusted as appropriate  - Keep care items and personal belongings within reach  - Initiate and maintain comfort rounds  - Make Fall Risk Sign visible to staff  - Offer Toileting every 2 Hours, in advance of need  - Initiate/Maintain bed alarm  - Obtain necessary fall risk management equipment: bed alarm   - Apply yellow socks and bracelet for high fall risk patients  - Consider moving patient to room near nurses station  Outcome: Progressing  Goal: Maintain or return to baseline ADL function  Description: INTERVENTIONS:  -  Assess patient's ability to carry out ADLs; assess patient's baseline for ADL function and identify physical deficits which impact ability to perform ADLs (bathing, care of mouth/teeth, toileting, grooming, dressing, etc.)  - Assess/evaluate cause of self-care deficits   - Assess range of motion  - Assess patient's mobility; develop plan if impaired  - Assess patient's need for assistive devices and provide as appropriate  - Encourage maximum independence but intervene and supervise when necessary  - Involve family in performance of ADLs  - Assess for home care needs following discharge   - Consider OT consult to assist with ADL evaluation and planning for discharge  - Provide patient education as appropriate  - Monitor functional capacity and physical performance, use of AM PAC & JH-HLM   - Monitor gait, balance and fatigue with ambulation    Outcome: Progressing  Goal: Maintains/Returns to pre admission functional level  Description: INTERVENTIONS:  - Perform AM-PAC 6 Click Basic Mobility/ Daily Activity assessment daily.  - Set and communicate daily mobility goal to care team and patient/family/caregiver.   - Collaborate with rehabilitation services on mobility goals if  consulted  - Out of bed for toileting  - Record patient progress and toleration of activity level   Outcome: Progressing     Problem: DISCHARGE PLANNING  Goal: Discharge to home or other facility with appropriate resources  Description: INTERVENTIONS:  - Identify barriers to discharge w/patient and caregiver  - Arrange for needed discharge resources and transportation as appropriate  - Identify discharge learning needs (meds, wound care, etc.)  - Arrange for interpretive services to assist at discharge as needed  - Refer to Case Management Department for coordinating discharge planning if the patient needs post-hospital services based on physician/advanced practitioner order or complex needs related to functional status, cognitive ability, or social support system  Outcome: Progressing     Problem: Knowledge Deficit  Goal: Patient/family/caregiver demonstrates understanding of disease process, treatment plan, medications, and discharge instructions  Description: Complete learning assessment and assess knowledge base.  Interventions:  - Provide teaching at level of understanding  - Provide teaching via preferred learning methods  Outcome: Progressing     Problem: Nutrition/Hydration-ADULT  Goal: Nutrient/Hydration intake appropriate for improving, restoring or maintaining nutritional needs  Description: Monitor and assess patient's nutrition/hydration status for malnutrition. Collaborate with interdisciplinary team and initiate plan and interventions as ordered.  Monitor patient's weight and dietary intake as ordered or per policy. Utilize nutrition screening tool and intervene as necessary. Determine patient's food preferences and provide high-protein, high-caloric foods as appropriate.     INTERVENTIONS:  - Monitor oral intake, urinary output, labs, and treatment plans  - Assess nutrition and hydration status and recommend course of action  - Evaluate amount of meals eaten  - Assist patient with eating if necessary    - Allow adequate time for meals  - Recommend/ encourage appropriate diets, oral nutritional supplements, and vitamin/mineral supplements  - Order, calculate, and assess calorie counts as needed  - Recommend, monitor, and adjust tube feedings and TPN/PPN based on assessed needs  - Assess need for intravenous fluids  - Provide specific nutrition/hydration education as appropriate  - Include patient/family/caregiver in decisions related to nutrition  Outcome: Progressing     Problem: Prexisting or High Potential for Compromised Skin Integrity  Goal: Skin integrity is maintained or improved  Description: INTERVENTIONS:  - Identify patients at risk for skin breakdown  - Assess and monitor skin integrity including under and around medical devices   - Assess and monitor nutrition and hydration status  - Monitor labs  - Assess for incontinence   - Turn and reposition patient  - Assist with mobility/ambulation  - Relieve pressure over mariano prominences   - Avoid friction and shearing  - Provide appropriate hygiene as needed including keeping skin clean and dry  - Evaluate need for skin moisturizer/barrier cream  - Collaborate with interdisciplinary team  - Patient/family teaching  - Consider wound care consult    Assess:  - Review Royce scale daily  Outcome: Progressing

## 2025-06-14 NOTE — ASSESSMENT & PLAN NOTE
Patient has had recurrent admissions for hepatic encephalopathy again presents with ammonia of 135.  Patient family states that  She was just discharged from skilled nursing facility yesterday and was doing well until this morning wake up confused emotional and they were concerned about her weakness and brought her to the emergency room.  It seems like she was taking her lactulose at home but unclear whether she was having bowel movements or not as she does at times get constipated and has issues  Placed on lactulose every 2 hours until she has at least 4 bowel movements and also continue Xifaxan.  Repeat  ammonia level in am and monitor neurological status

## 2025-06-14 NOTE — H&P
H&P - Hospitalist   Name: Bailey Olsen 54 y.o. female I MRN: [id number]  Unit/Bed#: [facility] I Date of Admission: 6/14/2025   Date of Service: 6/14/2025 I Hospital Day: 1     Assessment & Plan  Hepatic encephalopathy (HCC)  Patient has had recurrent admissions for hepatic encephalopathy again presents with ammonia of 135.  Patient family states that  She was just discharged from skilled nursing facility yesterday and was doing well until this morning wake up confused emotional and they were concerned about her weakness and brought her to the emergency room.  It seems like she was taking her lactulose at home but unclear whether she was having bowel movements or not as she does at times get constipated and has issues  Placed on lactulose every 2 hours until she has at least 4 bowel movements and also continue Xifaxan.  Repeat  ammonia level in am and monitor neurological status  Liver cirrhosis secondary to HYATT (nonalcoholic steatohepatitis) (HCC)  Known history of liver cirrhosis with recurrent admissions due to hepatic encephalopathy and constipation.  Hold torsemide and Aldactone and focus mainly on lactulose and Xifaxan today  Hypokalemia  k low at 2.8 will replace.  Check magnesium level as well and repeat BMP  History of malignant carcinoid tumor of small intestine  No further intervention at this time      VTE Pharmacologic Prophylaxis:   Moderate Risk (Score 3-4) - Pharmacological DVT Prophylaxis Contraindicated. Sequential Compression Devices Ordered.  Code Status: Level 1 - Full Code as per prior  Discussion with family: Updated  (daughter and sister) at bedside.    Anticipated Length of Stay: Patient will be admitted on an observation basis with an anticipated length of stay of less than 2 midnights secondary to acute hepatic encephalopathy.    History of Present Illness   Chief Complaint: Confusion    Bailey Olsen is a 54 y.o. female with a PMH of liver cirrhosis who presents with confusion,  emotional lability and generalized weakness.  Patient was just discharged from skilled nursing facility yesterday and was doing fine and then this morning woke up confused weak very emotionally labile family got concerned and brought her to the emergency room.  Ammonia found to be elevated at 135.  No fevers or chills reported previously.    Review of Systems   Constitutional:  Positive for appetite change and fatigue. Negative for chills and fever.   HENT:  Negative for hearing loss, sore throat and trouble swallowing.    Eyes:  Negative for photophobia, discharge and visual disturbance.   Respiratory:  Negative for chest tightness and shortness of breath.    Cardiovascular:  Negative for chest pain and palpitations.   Gastrointestinal:  Negative for abdominal pain, blood in stool and vomiting.   Endocrine: Negative for polydipsia and polyuria.   Genitourinary:  Negative for difficulty urinating, dysuria, flank pain and hematuria.   Musculoskeletal:  Negative for back pain and gait problem.   Skin:  Negative for rash.   Allergic/Immunologic: Negative for environmental allergies and food allergies.   Neurological:  Positive for weakness. Negative for dizziness, seizures, syncope and headaches.   Hematological:  Does not bruise/bleed easily.   Psychiatric/Behavioral:  Positive for behavioral problems and confusion.    All other systems reviewed and are negative.      Historical Information   Past Medical History[1]  Past Surgical History[2]  Social History[3]  E-Cigarette/Vaping    E-Cigarette Use Never User      E-Cigarette/Vaping Substances    Nicotine No     THC No     CBD No     Flavoring No     Other No     Unknown No      Family History[4]  Social History:  Marital Status: /Civil Union     Meds/Allergies   I have reveiwed home medications using records provided by [facility].  Prior to Admission medications    Medication Sig Start Date End Date Taking? Authorizing Provider   amitriptyline (ELAVIL) 50 mg tablet  Take 1 tablet (50 mg total) by mouth daily at bedtime 3/1/25   Mar Oliva MD   cyclobenzaprine (FLEXERIL) 10 mg tablet Take 10 mg by mouth as needed in the morning and 10 mg as needed at noon and 10 mg as needed in the evening for muscle spasms.    Historical Provider, MD   dapsone 25 mg tablet Take 50 mg by mouth in the morning.    Historical Provider, MD   folic acid (FOLVITE) 1 mg tablet Take 1 mg by mouth daily 8/4/16 2/28/25  Historical Provider, MD   lactulose (CHRONULAC) 10 g/15 mL solution Take 45 mL (30 g total) by mouth 4 (four) times a day 2/13/25   Idalia Stuart MD   pantoprazole (PROTONIX) 40 mg tablet Take 1 tablet (40 mg total) by mouth daily in the early morning 5/28/25   Idalia Stuart MD   polyethylene glycol (MIRALAX) 17 g packet Take 17 g by mouth daily as needed    Historical Provider, MD   rifaximin (XIFAXAN) 550 mg tablet Take 550 mg by mouth every 12 (twelve) hours    Historical Provider, MD   spironolactone (ALDACTONE) 25 mg tablet Take 1 tablet (25 mg total) by mouth daily 2/13/25 5/2/25  Idalia Stuart MD   torsemide (DEMADEX) 20 mg tablet Take 1 tablet (20 mg total) by mouth daily 5/29/25   Idalia Stuart MD   ursodiol (ACTIGALL) 300 mg capsule Take 300 mg by mouth in the morning and 300 mg in the evening.    Historical Provider, MD     Allergies   Allergen Reactions    Zolpidem GI Intolerance, Other (See Comments) and Hives     Other Reaction(s): Other      Blacks out    Blacks out    Tapentadol Sneezing and Other (See Comments)     Zoned out    Alprazolam Other (See Comments)     Other Reaction(s): Other      Feels zoned out    Feels zoned out    Amoxicillin-Pot Clavulanate Hives and Nausea Only     Amoxil fine    Dust Mite Extract Sneezing    Morphine Headache, Nausea Only and Vomiting    Tramadol GI Intolerance    Azelastine Rash    Azelastine Hcl Rash    Molds & Smuts Rash     Other Reaction(s): Unknown       Objective :  Temp:  [99.7 °F (37.6 °C)-100 °F (37.8 °C)] 99.7 °F (37.6  °C)  HR:  [102-104] 102  BP: (143)/(66-78) 143/78  Resp:  [15-16] 16  SpO2:  [95 %-96 %] 95 %  O2 Device: None (Room air)    Physical Exam  Vitals and nursing note reviewed.   Constitutional:       Comments: Wake and responds to her name however confused and talking things that are not making any sense   HENT:      Head: Normocephalic and atraumatic.      Right Ear: External ear normal.      Left Ear: External ear normal.      Nose: Nose normal.      Mouth/Throat:      Pharynx: Oropharynx is clear.     Eyes:      Pupils: Pupils are equal, round, and reactive to light.       Cardiovascular:      Rate and Rhythm: Normal rate and regular rhythm.      Heart sounds: Normal heart sounds.   Pulmonary:      Effort: Pulmonary effort is normal.      Breath sounds: Normal breath sounds.   Abdominal:      General: Bowel sounds are normal.      Palpations: Abdomen is soft.      Tenderness: There is no abdominal tenderness.     Musculoskeletal:         General: Normal range of motion.      Cervical back: Normal range of motion and neck supple.     Skin:     General: Skin is warm and dry.      Capillary Refill: Capillary refill takes less than 2 seconds.     Neurological:      Comments: oriented to person only otherwise appears confused   Psychiatric:      Comments: Anxious            Lines/Drains:            Lab Results: I have reviewed the following results:  Results from last 7 days   Lab Units 06/14/25  1512   WBC Thousand/uL 2.78*   HEMOGLOBIN g/dL 9.6*   HEMATOCRIT % 29.9*   PLATELETS Thousands/uL 57*     Results from last 7 days   Lab Units 06/14/25  1512   SODIUM mmol/L 141   POTASSIUM mmol/L 2.8*   CHLORIDE mmol/L 97   CO2 mmol/L 35*   BUN mg/dL 12   CREATININE mg/dL 1.03   ANION GAP mmol/L 9   CALCIUM mg/dL 8.5   GLUCOSE RANDOM mg/dL 109     Results from last 7 days   Lab Units 06/14/25  1512   INR  1.78*     Results from last 7 days   Lab Units 06/14/25  1510   POC GLUCOSE mg/dl 105     Lab Results   Component Value  Date    HGBA1C 4.7 02/26/2025    HGBA1C 5.9 (H) 02/27/2024    HGBA1C 5.9 08/03/2023           Imaging Results Review: I reviewed radiology reports from this admission including: CT head.  Other Study Results Review: EKG was reviewed.     Administrative Statements       ** Please Note: This note has been constructed using a voice recognition system. **         [1]   Past Medical History:  Diagnosis Date    Back pain     Carcinoid tumor     neuroendocrine    CHF (congestive heart failure) (HCC)     Cirrhosis of liver (HCC)     Hypertension     Sarcoidosis    [2]   Past Surgical History:  Procedure Laterality Date    CHOLECYSTECTOMY      GASTRIC BYPASS      HERNIA REPAIR      IR KYPHOPLASTY/VERTEBROPLASTY  10/15/2024   [3]   Social History  Tobacco Use    Smoking status: Never    Smokeless tobacco: Never   Vaping Use    Vaping status: Never Used   Substance and Sexual Activity    Alcohol use: Never    Drug use: Never    Sexual activity: Not Currently   [4]   Family History  Problem Relation Name Age of Onset    Hypertension Father      Heart attack Father      No Known Problems Sister      No Known Problems Brother      No Known Problems Brother      No Known Problems Brother      No Known Problems Brother      No Known Problems Brother      No Known Problems Daughter

## 2025-06-15 ENCOUNTER — APPOINTMENT (OUTPATIENT)
Dept: CT IMAGING | Facility: HOSPITAL | Age: 55
DRG: 642 | End: 2025-06-15
Payer: MEDICARE

## 2025-06-15 ENCOUNTER — APPOINTMENT (OUTPATIENT)
Dept: MRI IMAGING | Facility: HOSPITAL | Age: 55
DRG: 642 | End: 2025-06-15
Payer: MEDICARE

## 2025-06-15 PROBLEM — H54.7 VISION LOSS: Status: ACTIVE | Noted: 2025-06-15

## 2025-06-15 LAB
AMMONIA PLAS-SCNC: 118 UMOL/L (ref 18–72)
AMMONIA PLAS-SCNC: 57 UMOL/L (ref 18–72)
ANION GAP SERPL CALCULATED.3IONS-SCNC: 8 MMOL/L (ref 4–13)
ANION GAP SERPL CALCULATED.3IONS-SCNC: 9 MMOL/L (ref 4–13)
BUN SERPL-MCNC: 11 MG/DL (ref 5–25)
BUN SERPL-MCNC: 13 MG/DL (ref 5–25)
CALCIUM SERPL-MCNC: 8.7 MG/DL (ref 8.4–10.2)
CALCIUM SERPL-MCNC: 8.8 MG/DL (ref 8.4–10.2)
CHLORIDE SERPL-SCNC: 102 MMOL/L (ref 96–108)
CHLORIDE SERPL-SCNC: 104 MMOL/L (ref 96–108)
CO2 SERPL-SCNC: 29 MMOL/L (ref 21–32)
CO2 SERPL-SCNC: 32 MMOL/L (ref 21–32)
CREAT SERPL-MCNC: 1.09 MG/DL (ref 0.6–1.3)
CREAT SERPL-MCNC: 1.12 MG/DL (ref 0.6–1.3)
GFR SERPL CREATININE-BSD FRML MDRD: 55 ML/MIN/1.73SQ M
GFR SERPL CREATININE-BSD FRML MDRD: 57 ML/MIN/1.73SQ M
GLUCOSE SERPL-MCNC: 117 MG/DL (ref 65–140)
GLUCOSE SERPL-MCNC: 128 MG/DL (ref 65–140)
MAGNESIUM SERPL-MCNC: 2.2 MG/DL (ref 1.9–2.7)
POTASSIUM SERPL-SCNC: 2.9 MMOL/L (ref 3.5–5.3)
POTASSIUM SERPL-SCNC: 3.4 MMOL/L (ref 3.5–5.3)
SODIUM SERPL-SCNC: 142 MMOL/L (ref 135–147)
SODIUM SERPL-SCNC: 142 MMOL/L (ref 135–147)

## 2025-06-15 PROCEDURE — 70551 MRI BRAIN STEM W/O DYE: CPT

## 2025-06-15 PROCEDURE — 82140 ASSAY OF AMMONIA: CPT

## 2025-06-15 PROCEDURE — 99223 1ST HOSP IP/OBS HIGH 75: CPT | Performed by: PSYCHIATRY & NEUROLOGY

## 2025-06-15 PROCEDURE — 80048 BASIC METABOLIC PNL TOTAL CA: CPT | Performed by: FAMILY MEDICINE

## 2025-06-15 PROCEDURE — 83735 ASSAY OF MAGNESIUM: CPT | Performed by: FAMILY MEDICINE

## 2025-06-15 PROCEDURE — 99232 SBSQ HOSP IP/OBS MODERATE 35: CPT

## 2025-06-15 PROCEDURE — 70450 CT HEAD/BRAIN W/O DYE: CPT

## 2025-06-15 PROCEDURE — 82140 ASSAY OF AMMONIA: CPT | Performed by: FAMILY MEDICINE

## 2025-06-15 PROCEDURE — 80048 BASIC METABOLIC PNL TOTAL CA: CPT

## 2025-06-15 RX ORDER — POTASSIUM CHLORIDE 14.9 MG/ML
20 INJECTION INTRAVENOUS ONCE
Status: COMPLETED | OUTPATIENT
Start: 2025-06-15 | End: 2025-06-15

## 2025-06-15 RX ORDER — OLANZAPINE 10 MG/2ML
2.5 INJECTION, POWDER, FOR SOLUTION INTRAMUSCULAR ONCE
Status: COMPLETED | OUTPATIENT
Start: 2025-06-15 | End: 2025-06-15

## 2025-06-15 RX ORDER — POTASSIUM CHLORIDE 14.9 MG/ML
20 INJECTION INTRAVENOUS
Status: COMPLETED | OUTPATIENT
Start: 2025-06-15 | End: 2025-06-15

## 2025-06-15 RX ORDER — ATORVASTATIN CALCIUM 40 MG/1
40 TABLET, FILM COATED ORAL EVERY EVENING
Status: DISCONTINUED | OUTPATIENT
Start: 2025-06-15 | End: 2025-06-17 | Stop reason: HOSPADM

## 2025-06-15 RX ORDER — ATORVASTATIN CALCIUM 40 MG/1
40 TABLET, FILM COATED ORAL EVERY EVENING
Status: DISCONTINUED | OUTPATIENT
Start: 2025-06-15 | End: 2025-06-15

## 2025-06-15 RX ORDER — LACTULOSE 10 G/15ML
30 SOLUTION ORAL 4 TIMES DAILY
Status: DISCONTINUED | OUTPATIENT
Start: 2025-06-15 | End: 2025-06-17

## 2025-06-15 RX ORDER — ATORVASTATIN CALCIUM 40 MG/1
40 TABLET, FILM COATED ORAL EVERY EVENING
Status: DISCONTINUED | OUTPATIENT
Start: 2025-06-16 | End: 2025-06-15

## 2025-06-15 RX ORDER — POTASSIUM CHLORIDE 14.9 MG/ML
20 INJECTION INTRAVENOUS
Status: DISPENSED | OUTPATIENT
Start: 2025-06-15 | End: 2025-06-15

## 2025-06-15 RX ORDER — POTASSIUM CHLORIDE 1500 MG/1
40 TABLET, EXTENDED RELEASE ORAL ONCE
Status: COMPLETED | OUTPATIENT
Start: 2025-06-15 | End: 2025-06-15

## 2025-06-15 RX ADMIN — ACETAMINOPHEN 650 MG: 325 TABLET ORAL at 01:19

## 2025-06-15 RX ADMIN — LACTULOSE 30 G: 10 SOLUTION ORAL at 10:15

## 2025-06-15 RX ADMIN — POTASSIUM CHLORIDE 40 MEQ: 1500 TABLET, EXTENDED RELEASE ORAL at 08:26

## 2025-06-15 RX ADMIN — POTASSIUM CHLORIDE 20 MEQ: 14.9 INJECTION, SOLUTION INTRAVENOUS at 18:20

## 2025-06-15 RX ADMIN — RIFAXIMIN 550 MG: 550 TABLET ORAL at 20:46

## 2025-06-15 RX ADMIN — POTASSIUM CHLORIDE 20 MEQ: 14.9 INJECTION, SOLUTION INTRAVENOUS at 08:26

## 2025-06-15 RX ADMIN — LACTULOSE 30 G: 10 SOLUTION ORAL at 15:46

## 2025-06-15 RX ADMIN — LACTULOSE 30 G: 10 SOLUTION ORAL at 08:27

## 2025-06-15 RX ADMIN — PANTOPRAZOLE SODIUM 40 MG: 40 TABLET, DELAYED RELEASE ORAL at 05:19

## 2025-06-15 RX ADMIN — LACTULOSE 30 G: 10 SOLUTION ORAL at 01:00

## 2025-06-15 RX ADMIN — POTASSIUM CHLORIDE 20 MEQ: 14.9 INJECTION, SOLUTION INTRAVENOUS at 10:20

## 2025-06-15 RX ADMIN — LACTULOSE 30 G: 10 SOLUTION ORAL at 20:46

## 2025-06-15 RX ADMIN — POTASSIUM CHLORIDE 20 MEQ: 14.9 INJECTION, SOLUTION INTRAVENOUS at 20:45

## 2025-06-15 RX ADMIN — ATORVASTATIN CALCIUM 40 MG: 40 TABLET, FILM COATED ORAL at 20:46

## 2025-06-15 RX ADMIN — LACTULOSE 30 G: 10 SOLUTION ORAL at 13:45

## 2025-06-15 RX ADMIN — LACTULOSE 30 G: 10 SOLUTION ORAL at 11:56

## 2025-06-15 RX ADMIN — POLYETHYLENE GLYCOL 3350 17 G: 17 POWDER, FOR SOLUTION ORAL at 08:28

## 2025-06-15 RX ADMIN — LACTULOSE 30 G: 10 SOLUTION ORAL at 05:19

## 2025-06-15 RX ADMIN — AMITRIPTYLINE HYDROCHLORIDE 50 MG: 25 TABLET, FILM COATED ORAL at 20:46

## 2025-06-15 RX ADMIN — RIFAXIMIN 550 MG: 550 TABLET ORAL at 08:26

## 2025-06-15 RX ADMIN — OLANZAPINE 2.5 MG: 10 INJECTION, POWDER, FOR SOLUTION INTRAMUSCULAR at 17:13

## 2025-06-15 RX ADMIN — LACTULOSE 30 G: 10 SOLUTION ORAL at 02:40

## 2025-06-15 NOTE — ASSESSMENT & PLAN NOTE
Known history of liver cirrhosis with recurrent admissions due to hepatic encephalopathy and constipation.  Hold torsemide and Aldactone and focus mainly on lactulose and Xifaxan today as oral intake is low

## 2025-06-15 NOTE — ASSESSMENT & PLAN NOTE
Presents to ED with confusion, generalized weakness -had been home for 1 day since discharge from SNF  Has history of [place] with recurrent issues with hepatic encephalopathy  Managed at home on lactulose 30 g 4 times daily and rifaximin     Ammonia 135 on admission-continue to trend, improved to 118  Lactulose 30 g p.o. every 2 hours -has had 3 bowel movements thus far  Additional daily MiraLAX  Intake and output with BM counts to assist with lactulose titration  Avoid any sedatives or hepatotoxic medications if at all possible  Neuro checks   Continue to trend above values with morning labs

## 2025-06-15 NOTE — ASSESSMENT & PLAN NOTE
Potassium chronically low-maintain on 40 mill equivalents daily as an outpatient  Continue potassium supplementation today

## 2025-06-15 NOTE — CONSULTS
Consultation - Neurology   Name: Bailey Olsen 54 y.o. female I MRN: [id number]  Unit/Bed#: -01 I Date of Admission: 6/14/2025   Date of Service: 6/15/2025 I Hospital Day: 1   Inpatient consult to Neurology  Consult performed by: Heath Blunt MD  Consult ordered by: Laura Bell PA-C        Physician Requesting Evaluation: Idalia Stuart MD   Reason for Evaluation / Principal Problem: confusion, vision loss    Assessment & Plan  Vision loss  Patient presented yesterday with confusion, initially presumed to be hepatic encephalopathy. (See stroke alert note from yesterday).  Ammonia was normalized. However the patient remained intermittently confused. More over she stated that she could not see anything this morning, for which we were consulted.    Upon our eval, there are numerous exam features that are highly suggestive of psychogenic origin, including intermittent stuttering and non-organic pattern of vision loss (see exam below).    Considering limitation of tele exam, ee recommend obtaining MRI brain w/o contrast. If negative, then patient can be reassured and no further work-up noted.  Hepatic encephalopathy (HCC)    Liver cirrhosis secondary to HYATT (nonalcoholic steatohepatitis) (HCC)    History of malignant carcinoid tumor of small intestine    Hypokalemia    I have discussed the above management plan in detail with the primary service.     Recommendations for outpatient neurological follow up have yet to be determined.    History of Present Illness   Bailey Olsen is a 54 y.o. female with history of HYATT and liver cirrhosis who presented yesterday as stroke alert for confusion noted upon waking up in the morning. ED MD noted speech difficulties and other intermittent findings such as eye deviation. Ammonia was 135 and patient had similar presentations before so she was admitted for hepatic encephalopathy.  This morning, patient stated that she could not see, so we were approached again by team.  When we  "logged in on the video kit, patient was noted to immediately notice the examiner on the TV. However as soon as it was announced we are the neurology team, the patient turned her head and appeared disengaged.   (who was at bedside) stated that the patient appears to have an \"empty brain\" as \"she is not there\". He stated that she has been confused and stuttering, but fluctuating, and that she could not see.  The patient herself was talking at times, answering some questions. At times when we were asking about the medication (Amitriptyline), and the  was stating how it was sedating her, the patient joined the conversation answering clearly how it has helped her and such, only to again start stuttering few minutes later.    Review of Systems   negative  Historical Information   Past Medical History[1]  Past Surgical History[2]  Social History[3]  E-Cigarette/Vaping    E-Cigarette Use Never User      E-Cigarette/Vaping Substances    Nicotine No     THC No     CBD No     Flavoring No     Other No     Unknown No      Family History[4]    Objective :  Temp:  [97.3 °F (36.3 °C)-97.7 °F (36.5 °C)] 97.7 °F (36.5 °C)  HR:  [] 98  BP: (126-142)/(68-97) 142/97  Resp:  [18-20] 20  SpO2:  [94 %-96 %] 96 %  O2 Device: None (Room air)    Physical Exam  NAD  Skin: no seen lesions  HEENT: ATNC  Chest: breathing comfortably on room air  GI: no apparent distension.      Neurological Exam  Alert and oriented to self and hospital. Answers intermittently. Speech is stuttered. States that she cannot see. When  asked to give her a cup of water, she reached with her arm, but did not reach the cup as \"she could not see it\", yet she appeared to be tracking the movement with her eyes.  When asked to lift arms, she lifted both up keeping the elbows on the bed, when asked to lift higher, she said she could not.        Lab Results: I have reviewed the following results:CBC:   Results from last 7 days   Lab Units " 06/14/25  1512   WBC Thousand/uL 2.78*   RBC Million/uL 3.28*   HEMOGLOBIN g/dL 9.6*   HEMATOCRIT % 29.9*   MCV fL 91   PLATELETS Thousands/uL 57*   , BMP/CMP:   Results from last 7 days   Lab Units 06/15/25  1549 06/15/25  0503 06/14/25 2126   SODIUM mmol/L 142 142 140   POTASSIUM mmol/L 3.4* 2.9* 2.9*   CHLORIDE mmol/L 104 102 101   CO2 mmol/L 29 32 32   BUN mg/dL 11 13 12   CREATININE mg/dL 1.12 1.09 0.95   CALCIUM mg/dL 8.8 8.7 8.4   EGFR ml/min/1.73sq m 55 57 67   , Vitamin B12:   , HgBA1C:   , TSH:     Recent Labs     06/14/25  1512 06/14/25 2126 06/15/25  0503 06/15/25  1549   WBC 2.78*  --   --   --    HGB 9.6*  --   --   --    HCT 29.9*  --   --   --    PLT 57*  --   --   --    SODIUM 141   < > 142 142   K 2.8*   < > 2.9* 3.4*   CL 97   < > 102 104   CO2 35*   < > 32 29   BUN 12   < > 13 11   CREATININE 1.03   < > 1.09 1.12   GLUC 109   < > 117 128   MG 1.7*  --  2.2  --     < > = values in this interval not displayed.     Imaging Results Review: I personally reviewed the following image studies in PACS and associated radiology reports: CT head. My interpretation of the radiology images/reports is: not significant.  Other Study Results Review: No additional pertinent studies reviewed.    VTE Prophylaxis: VTE covered by:    None       Administrative Statements   VIRTUAL CARE DOCUMENTATION:     1. This service was provided via Telemedicine using [employer] Kit     2. Parties in the room with patient during teleconsult Family member:      3. Confidentiality My office door was closed     4. Participants No one else was in the room    5. Patient acknowledged consent and understanding of privacy and security of the  Telemedicine consult. I informed the patient that I have reviewed their record in Epic and presented the opportunity for them to ask any questions regarding the visit today.  The patient agreed to participate.    6. I have spent a total time of 60 minutes in caring for this patient on the day  of the visit/encounter including Counseling / Coordination of care, not including the time spent for establishing the audio/video connection.           [1]   Past Medical History:  Diagnosis Date    Back pain     Carcinoid tumor     neuroendocrine    CHF (congestive heart failure) (HCC)     Cirrhosis of liver (HCC)     Hypertension     Sarcoidosis    [2]   Past Surgical History:  Procedure Laterality Date    CHOLECYSTECTOMY      GASTRIC BYPASS      HERNIA REPAIR      IR KYPHOPLASTY/VERTEBROPLASTY  10/15/2024   [3]   Social History  Tobacco Use    Smoking status: Never    Smokeless tobacco: Never   Vaping Use    Vaping status: Never Used   Substance and Sexual Activity    Alcohol use: Never    Drug use: Never    Sexual activity: Not Currently   [4]   Family History  Problem Relation Name Age of Onset    Hypertension Father      Heart attack Father      No Known Problems Sister      No Known Problems Brother      No Known Problems Brother      No Known Problems Brother      No Known Problems Brother      No Known Problems Brother      No Known Problems Daughter

## 2025-06-15 NOTE — PLAN OF CARE
Problem: PAIN - ADULT  Goal: Verbalizes/displays adequate comfort level or baseline comfort level  Description: Interventions:  - Encourage patient to monitor pain and request assistance  - Assess pain using appropriate pain scale  - Administer analgesics as ordered based on type and severity of pain and evaluate response  - Implement non-pharmacological measures as appropriate and evaluate response  - Consider cultural and social influences on pain and pain management  - Notify physician/advanced practitioner if interventions unsuccessful or patient reports new pain  - Educate patient/family on pain management process including their role and importance of  reporting pain   - Provide non-pharmacologic/complimentary pain relief interventions  Outcome: Progressing     Problem: INFECTION - ADULT  Goal: Absence or prevention of progression during hospitalization  Description: INTERVENTIONS:  - Assess and monitor for signs and symptoms of infection  - Monitor lab/diagnostic results  - Monitor all insertion sites, i.e. indwelling lines, tubes, and drains  - Monitor endotracheal if appropriate and nasal secretions for changes in amount and color  - [place] appropriate cooling/warming therapies per order  - Administer medications as ordered  - Instruct and encourage patient and family to use good hand hygiene technique  - Identify and instruct in appropriate isolation precautions for identified infection/condition  Outcome: Progressing     Problem: SAFETY ADULT  Goal: Patient will remain free of falls  Description: INTERVENTIONS:  - Educate patient/family on patient safety including physical limitations  - Instruct patient to call for assistance with activity   - Consider consulting OT/PT to assist with strengthening/mobility based on AM PAC & JH-HLM score  - Consult OT/PT to assist with strengthening/mobility   - Keep Call bell within reach  - Keep bed low and locked with side rails adjusted as appropriate  - Keep  care items and personal belongings within reach  - Initiate and maintain comfort rounds  - Make Fall Risk Sign visible to staff  - Offer Toileting every 2 Hours, in advance of need  - Initiate/Maintain bed alarm  - Apply yellow socks and bracelet for high fall risk patients  - Consider moving patient to room near nurses station  Outcome: Progressing     Problem: Nutrition/Hydration-ADULT  Goal: Nutrient/Hydration intake appropriate for improving, restoring or maintaining nutritional needs  Description: Monitor and assess patient's nutrition/hydration status for malnutrition. Collaborate with interdisciplinary team and initiate plan and interventions as ordered.  Monitor patient's weight and dietary intake as ordered or per policy. Utilize nutrition screening tool and intervene as necessary. Determine patient's food preferences and provide high-protein, high-caloric foods as appropriate.     INTERVENTIONS:  - Monitor oral intake, urinary output, labs, and treatment plans  - Assess nutrition and hydration status and recommend course of action  - Evaluate amount of meals eaten  - Assist patient with eating if necessary   - Allow adequate time for meals  - Recommend/ encourage appropriate diets, oral nutritional supplements, and vitamin/mineral supplements  - Order, calculate, and assess calorie counts as needed  - Recommend, monitor, and adjust tube feedings and TPN/PPN based on assessed needs  - Assess need for intravenous fluids  - Provide specific nutrition/hydration education as appropriate  - Include patient/family/caregiver in decisions related to nutrition  Outcome: Progressing     Problem: Prexisting or High Potential for Compromised Skin Integrity  Goal: Skin integrity is maintained or improved  Description: INTERVENTIONS:  - Identify patients at risk for skin breakdown  - Assess and monitor skin integrity including under and around medical devices   - Assess and monitor nutrition and hydration status  -  Monitor labs  - Assess for incontinence   - Turn and reposition patient  - Assist with mobility/ambulation  - Relieve pressure over mariano prominences   - Avoid friction and shearing  - Provide appropriate hygiene as needed including keeping skin clean and dry  - Evaluate need for skin moisturizer/barrier cream  - Collaborate with interdisciplinary team  - Patient/family teaching  - Consider wound care consult  Outcome: Progressing

## 2025-06-15 NOTE — NURSING NOTE
PA assessed pt. New orders placed, ammonia drawn and ct head done. Neuro to follow up after ct and labs results received.

## 2025-06-15 NOTE — NURSING NOTE
Pt family came to nurse stating patient is having trouble seeing. Pt states that everything is dark. This nurse attempted to use the NIHSS scale for reference. Pt did not look at scale. Pt appeared to be following voices. When hand placed in front of eyes pt closed eyes and on separate occasion pt followed fingers across visual field. PA aware and will assess pt.

## 2025-06-15 NOTE — PLAN OF CARE
Problem: PAIN - ADULT  Goal: Verbalizes/displays adequate comfort level or baseline comfort level  Description: Interventions:  - Encourage patient to monitor pain and request assistance  - Assess pain using appropriate pain scale  - Administer analgesics as ordered based on type and severity of pain and evaluate response  - Implement non-pharmacological measures as appropriate and evaluate response  - Consider cultural and social influences on pain and pain management  - Notify physician/advanced practitioner if interventions unsuccessful or patient reports new pain  - Educate patient/family on pain management process including their role and importance of  reporting pain   - Provide non-pharmacologic/complimentary pain relief interventions  Outcome: Progressing     Problem: INFECTION - ADULT  Goal: Absence or prevention of progression during hospitalization  Description: INTERVENTIONS:  - Assess and monitor for signs and symptoms of infection  - Monitor lab/diagnostic results  - Monitor all insertion sites, i.e. indwelling lines, tubes, and drains  - Monitor endotracheal if appropriate and nasal secretions for changes in amount and color  - [place] appropriate cooling/warming therapies per order  - Administer medications as ordered  - Instruct and encourage patient and family to use good hand hygiene technique  - Identify and instruct in appropriate isolation precautions for identified infection/condition  Outcome: Progressing  Goal: Absence of fever/infection during neutropenic period  Description: INTERVENTIONS:  - Monitor WBC  - Perform strict hand hygiene  - Limit to healthy visitors only  - No plants, dried, fresh or silk flowers with ortiz in patient room  Outcome: Progressing     Problem: SAFETY ADULT  Goal: Patient will remain free of falls  Description: INTERVENTIONS:  - Educate patient/family on patient safety including physical limitations  - Instruct patient to call for assistance with activity   -  Consider consulting OT/PT to assist with strengthening/mobility based on AM PAC & JH-HLM score  - Consult OT/PT to assist with strengthening/mobility   - Keep Call bell within reach  - Keep bed low and locked with side rails adjusted as appropriate  - Keep care items and personal belongings within reach  - Initiate and maintain comfort rounds  - Make Fall Risk Sign visible to staff  - Offer Toileting every 2 Hours, in advance of need  - Initiate/Maintain bed alarm    - Apply yellow socks and bracelet for high fall risk patients  - Consider moving patient to room near nurses station  Outcome: Progressing  Goal: Maintain or return to baseline ADL function  Description: INTERVENTIONS:  -  Assess patient's ability to carry out ADLs; assess patient's baseline for ADL function and identify physical deficits which impact ability to perform ADLs (bathing, care of mouth/teeth, toileting, grooming, dressing, etc.)  - Assess/evaluate cause of self-care deficits   - Assess range of motion  - Assess patient's mobility; develop plan if impaired  - Assess patient's need for assistive devices and provide as appropriate  - Encourage maximum independence but intervene and supervise when necessary  - Involve family in performance of ADLs  - Assess for home care needs following discharge   - Consider OT consult to assist with ADL evaluation and planning for discharge  - Provide patient education as appropriate  - Monitor functional capacity and physical performance, use of AM PAC & JH-HLM   - Monitor gait, balance and fatigue with ambulation    Outcome: Progressing  Goal: Maintains/Returns to pre admission functional level  Description: INTERVENTIONS:  - Perform AM-PAC 6 Click Basic Mobility/ Daily Activity assessment daily.  - Set and communicate daily mobility goal to care team and patient/family/caregiver.   - Collaborate with rehabilitation services on mobility goals if consulted  - Perform Range of Motion 3 times a day.  -  Reposition patient every 2 hours.  - Dangle patient 2 times a day  - Out of bed for meals 2 times a day  - Out of bed for toileting  - Record patient progress and toleration of activity level   Outcome: Progressing     Problem: DISCHARGE PLANNING  Goal: Discharge to home or other facility with appropriate resources  Description: INTERVENTIONS:  - Identify barriers to discharge w/patient and caregiver  - Arrange for needed discharge resources and transportation as appropriate  - Identify discharge learning needs (meds, wound care, etc.)  - Arrange for interpretive services to assist at discharge as needed  - Refer to Case Management Department for coordinating discharge planning if the patient needs post-hospital services based on physician/advanced practitioner order or complex needs related to functional status, cognitive ability, or social support system  Outcome: Progressing     Problem: Knowledge Deficit  Goal: Patient/family/caregiver demonstrates understanding of disease process, treatment plan, medications, and discharge instructions  Description: Complete learning assessment and assess knowledge base.  Interventions:  - Provide teaching at level of understanding  - Provide teaching via preferred learning methods  Outcome: Progressing     Problem: Nutrition/Hydration-ADULT  Goal: Nutrient/Hydration intake appropriate for improving, restoring or maintaining nutritional needs  Description: Monitor and assess patient's nutrition/hydration status for malnutrition. Collaborate with interdisciplinary team and initiate plan and interventions as ordered.  Monitor patient's weight and dietary intake as ordered or per policy. Utilize nutrition screening tool and intervene as necessary. Determine patient's food preferences and provide high-protein, high-caloric foods as appropriate.     INTERVENTIONS:  - Monitor oral intake, urinary output, labs, and treatment plans  - Assess nutrition and hydration status and recommend  course of action  - Evaluate amount of meals eaten  - Assist patient with eating if necessary   - Allow adequate time for meals  - Recommend/ encourage appropriate diets, oral nutritional supplements, and vitamin/mineral supplements  - Order, calculate, and assess calorie counts as needed  - Recommend, monitor, and adjust tube feedings and TPN/PPN based on assessed needs  - Assess need for intravenous fluids  - Provide specific nutrition/hydration education as appropriate  - Include patient/family/caregiver in decisions related to nutrition  Outcome: Progressing     Problem: Prexisting or High Potential for Compromised Skin Integrity  Goal: Skin integrity is maintained or improved  Description: INTERVENTIONS:  - Identify patients at risk for skin breakdown  - Assess and monitor skin integrity including under and around medical devices   - Assess and monitor nutrition and hydration status  - Monitor labs  - Assess for incontinence   - Turn and reposition patient  - Assist with mobility/ambulation  - Relieve pressure over mariano prominences   - Avoid friction and shearing  - Provide appropriate hygiene as needed including keeping skin clean and dry  - Evaluate need for skin moisturizer/barrier cream  - Collaborate with interdisciplinary team  - Patient/family teaching  - Consider wound care consult    Assess:  - Review Royce scale daily  - Clean and moisturize skin every shift  - Inspect skin when repositioning, toileting, and assisting with ADLS    - Assess extremities for adequate circulation and sensation     Bed Management:  - Have minimal linens on bed & keep smooth, unwrinkled  - Change linens as needed when moist or perspiring  - Avoid sitting or lying in one position for more than 2 hours while in bed?Keep HOB at 30 degrees   - Toileting:  - Offer bedside commode  - Assess for incontinence every 2  - Use incontinent care products after each incontinent episode such as barrier wipes    Activity:  - Mobilize  patient 3 times a day  - Encourage activity and walks on unit  - Encourage or provide ROM exercises   - Turn and reposition patient every 2 Hours  - Use appropriate equipment to lift or move patient in bed      Skin Care:  - Avoid use of baby powder, tape, friction and shearing, hot water or constrictive clothing  - Relieve pressure over bony prominences using pillows  - Do not massage red bony areas    Next Steps:    Outcome: Progressing

## 2025-06-15 NOTE — PROGRESS NOTES
Progress Note - Hospitalist   Name: Bailey Olsen 54 y.o. female I MRN: [id number]  Unit/Bed#: -01 I Date of Admission: 6/14/2025   Date of Service: 6/15/2025 I Hospital Day: 1    Assessment & Plan  Hepatic encephalopathy (HCC)  Presents to ED with confusion, generalized weakness -had been home for 1 day since discharge from SNF  Has history of Hyatt with recurrent issues with hepatic encephalopathy  Managed at home on lactulose 30 g 4 times daily and rifaximin     Ammonia 135 on admission-continue to trend, improved to 118  Lactulose 30 g p.o. every 2 hours -has had 3 bowel movements thus far  Additional daily MiraLAX  Intake and output with BM counts to assist with lactulose titration  Avoid any sedatives or hepatotoxic medications if at all possible  Neuro checks   Continue to trend above values with morning labs   Liver cirrhosis secondary to HYATT (nonalcoholic steatohepatitis) (HCC)  Known history of liver cirrhosis with recurrent admissions due to hepatic encephalopathy and constipation.  Hold torsemide and Aldactone and focus mainly on lactulose and Xifaxan today as oral intake is low  Hypokalemia  Potassium chronically low-maintain on 40 mill equivalents daily as an outpatient  Continue potassium supplementation today    History of malignant carcinoid tumor of small intestine  No further intervention at this time    VTE Pharmacologic Prophylaxis:   Moderate Risk (Score 3-4) - Pharmacological DVT Prophylaxis Contraindicated. Sequential Compression Devices Ordered.    Mobility:   Basic Mobility Inpatient Raw Score: 8  JH-HLM Goal: 3: Sit at edge of bed  JH-HLM Achieved: 2: Bed activities/Dependent transfer  JH-HLM Goal NOT achieved. Continue with multidisciplinary rounding and encourage appropriate mobility to improve upon JH-HLM goals.    Patient Centered Rounds: I performed bedside rounds with nursing staff today.   Discussions with Specialists or Other Care Team Provider: CM    Education and  Discussions with Family / Patient: Updated  () at bedside.    Current Length of Stay: 1 day(s)  Current Patient Status: Observation   Certification Statement: The patient will continue to require additional inpatient hospital stay due to hepatic encephalopathy  Discharge Plan: Anticipate discharge in 48-72 hrs to discharge location to be determined pending rehab evaluations.    Code Status: Level 1 - Full Code    Subjective   Seen and examined.  Feeling very anxious and confused.  Easily upset during my exam.  Is disoriented to everything but person.     Objective :  Temp:  [97.3 °F (36.3 °C)-100 °F (37.8 °C)] 97.3 °F (36.3 °C)  HR:  [] 98  BP: (125-143)/(66-79) 126/79  Resp:  [15-20] 20  SpO2:  [94 %-96 %] 94 %  O2 Device: None (Room air)    Body mass index is 40.67 kg/m².     Input and Output Summary (last 24 hours):     Intake/Output Summary (Last 24 hours) at 6/15/2025 1034  Last data filed at 6/15/2025 0805  Gross per 24 hour   Intake 0 ml   Output 488 ml   Net -488 ml       Physical Exam  Vitals and nursing note reviewed.   Constitutional:       General: She is not in acute distress.     Appearance: Normal appearance. She is obese. She is ill-appearing.   HENT:      Head: Normocephalic and atraumatic.      Nose: No congestion.      Mouth/Throat:      Mouth: Mucous membranes are moist.      Pharynx: Oropharynx is clear.     Eyes:      Conjunctiva/sclera: Conjunctivae normal.      Pupils: Pupils are equal, round, and reactive to light.       Cardiovascular:      Rate and Rhythm: Normal rate and regular rhythm.      Pulses: Normal pulses.      Heart sounds: Normal heart sounds. No murmur heard.  Pulmonary:      Effort: Pulmonary effort is normal. No respiratory distress.      Breath sounds: Normal breath sounds.   Abdominal:      General: Bowel sounds are normal.      Palpations: Abdomen is soft.      Tenderness: There is no abdominal tenderness.     Musculoskeletal:         General:  Normal range of motion.      Cervical back: Normal range of motion.      Right lower leg: No edema.      Left lower leg: No edema.     Skin:     General: Skin is warm and dry.     Neurological:      Mental Status: She is disoriented and confused.     Psychiatric:         Mood and Affect: Mood is anxious.         Behavior: Behavior normal.           Lines/Drains:              Lab Results: I have reviewed the following results:   Results from last 7 days   Lab Units 06/14/25  1512   WBC Thousand/uL 2.78*   HEMOGLOBIN g/dL 9.6*   HEMATOCRIT % 29.9*   PLATELETS Thousands/uL 57*     Results from last 7 days   Lab Units 06/15/25  0503   SODIUM mmol/L 142   POTASSIUM mmol/L 2.9*   CHLORIDE mmol/L 102   CO2 mmol/L 32   BUN mg/dL 13   CREATININE mg/dL 1.09   ANION GAP mmol/L 8   CALCIUM mg/dL 8.7   GLUCOSE RANDOM mg/dL 117     Results from last 7 days   Lab Units 06/14/25  1512   INR  1.78*     Results from last 7 days   Lab Units 06/14/25  1510   POC GLUCOSE mg/dl 105               Recent Cultures (last 7 days):               Last 24 Hours Medication List:     Current Facility-Administered Medications:     acetaminophen (TYLENOL) tablet 650 mg, Q6H PRN    lactulose (CHRONULAC) oral solution 30 g, Q2H    pantoprazole (PROTONIX) EC tablet 40 mg, Early Morning    polyethylene glycol (MIRALAX) packet 17 g, Daily    potassium chloride 20 mEq IVPB (premix), Q2H, Last Rate: 20 mEq (06/15/25 1020)    rifaximin (XIFAXAN) tablet 550 mg, Q12H MIGUEL    Administrative Statements   Today, Patient Was Seen By: Laura Bell PA-C      **Please Note: This note may have been constructed using a voice recognition system.**

## 2025-06-15 NOTE — UTILIZATION REVIEW
Initial Clinical Review    WAS OBSERVATION 6/14/25 @ 1659 CONVERTED TO INPATIENT ADMISSION 6/16/25 @ 1314 DUE TO CONTINUED STAY REQUIRED TO CARE FOR PATIENT WITH DX: ENCEPHALOPATHY.    Admission: Date/Time/Statement:   Admission Orders (From admission, onward)       Ordered        06/16/25 1314  INPATIENT ADMISSION  Once            06/14/25 1659  Place in Observation  Once                                  Orders Placed This Encounter   Procedures    Place in Observation     Standing Status:   Standing     Number of Occurrences:   1     Level of Care:   Med Surg [16]    INPATIENT ADMISSION     Standing Status:   Standing     Number of Occurrences:   1     Level of Care:   Med Surg [16]     Estimated length of stay:   More than 2 Midnights     Certification:   I certify that inpatient services are medically necessary for this patient for a duration of greater than two midnights. See H&P and MD Progress Notes for additional information about the patient's course of treatment.     ED Arrival Information       Expected   -    Arrival   6/14/2025 15:03    Acuity   Emergent              Means of arrival   Ambulance    Escorted by   [facility]    Service   Hospitalist    Admission type   Emergency              Arrival complaint   ams             Chief Complaint   Patient presents with    Altered Mental Status     Per EMS patient was at [facility] for physical therapy. Came home yesterday.  states that today when he came home from work he noticed she was confused, and unable to standup. Not c/o pain at this time. Per patients , her last known normal was last night.        Initial Presentation: 54 y.o. female to ED via EMS from home  Present to ED with hepatic encephalopathy. She was just discharged from skilled nursing facility yesterday and was doing well until this morning wake up confused emotional and they were concerned about her weakness and brought her to the emergency room.   PMHX liver cirrhosis;  "sarcoidosis; CHF; HTN; HYATT with recurrent issues with hepatic encephalopathy    Admitted to OBS with DX: Hepatic encephalopathy   on exam: confused - oriented to person only; tachy; T100; WBC 2.78; Heme 9.6; K 2.8; INR 1.78; Ammonia 135  PLAN: lactulose po Q2H; KCL Q2H - recd x3; monitor labs; neuro checks; f/u repeat ammonia level      Anticipated Length of Stay/Certification Statement: Patient will be admitted on an observation basis with an anticipated length of stay of less than 2 midnights secondary to acute hepatic encephalopathy.       Date: 6/15/25    Day 2 - OBSERVATION  Feeling very anxious and confused.  Easily upset during my exam.  Is disoriented to everything but person. has had 3 bowel movements thus far. Exam: ammonia 118; tachy; tachypnea; K 2.9  Plan:  lactulose po Q2H; KCL Q2H - recd x4; monitor labs; neuro checks; start daily MiraLAX      NEUROLOGY CONSULT   Vision loss:  she stated that she could not see anything this morning, for which we were consulted. Upon our eval, there are numerous exam features that are highly suggestive of psychogenic origin, including intermittent stuttering and non-organic pattern of vision loss   Exam: Alert and oriented to self and hospital. Answers intermittently. Speech is stuttered. States that she cannot see. When  asked to give her a cup of water, she reached with her arm, but did not reach the cup as \"she could not see it\", yet she appeared to be tracking the movement with her eyes.  When asked to lift arms, she lifted both up keeping the elbows on the bed, when asked to lift higher, she said she could not.  Plan: MRI brain w/o contrast       Date: 6/16/25 - CHANGED TO INPATIENT   Symptoms are greatly improved today patient near baseline. Patient is alert, making conversation.  Her speech is much improved, mild slurring and pausing but coherently forming sentences. Her vision has improved as well and patient notes that she can see fairly normally " today. better oral intake today - resume torsemide and Aldactone. Hypkalemia improved. Aggressive lactulose dosing decreased to 30 g p.o. 4 times daily with additional MiraLAX   Exam: Mg 1.7; ammonia 57 / 50; K 3.6  MRI brain wo contrast -diffusely increased cortical signal abnormality involving bilateral cerebral and cerebral hemispheres, basal ganglia, thalami and brainstem -worrisome for severe toxic/metabolic encephalopathy, due to history concerning for severe hepatic encephalopathy, additional ddx: Seizure related changes, possibly underlying infectious/inflammatory encephalitis  MRI brain with contrast -no pathologic enhancement  Plan:  lactulose po QID; monitor labs; neuro checks;  daily MiraLAX; PT/ OT eval - tx      Date: 6/17/25    Day 2 - INPATIENT   Discharged   Disposition:  home       ED Treatment-Medication Administration from 06/14/2025 1502 to 06/14/2025 1733         Date/Time Order Dose Route Action     06/14/2025 1524 iohexol (OMNIPAQUE) 350 MG/ML injection (MULTI-DOSE) 100 mL 100 mL Intravenous Given     06/14/2025 1555 potassium chloride 20 mEq IVPB (premix) 20 mEq Intravenous New Bag     06/14/2025 1553 sodium chloride 0.9 % bolus 1,000 mL 1,000 mL Intravenous New Bag     06/14/2025 1608 magnesium sulfate 2 g/50 mL IVPB (premix) 2 g 2 g Intravenous New Bag            Scheduled Medications:  lactulose, 30 g, Oral, Q2H  pantoprazole, 40 mg, Oral, Early Morning  polyethylene glycol, 17 g, Oral, Daily  potassium chloride, 20 mEq, Intravenous, Q2H (6/14 recd x3 / 6/15 recd x4)  rifaximin, 550 mg, Oral, Q12H MIGUEL    magnesium sulfate 2 g/50 mL IVPB (premix) 2 g  Dose: 2 g  Freq: Once Route: IV  Last Dose: 2 g (06/16/25 1436)  Start: 06/16/25 1415 End: 06/16/25 1536      Continuous IV Infusions: None       PRN Meds:  acetaminophen, 650 mg, Oral, Q6H PRN    ketorolac (TORADOL) injection 15 mg  Dose: 15 mg  Freq: Once Route: IV  Start: 06/16/25 1115 End: 06/16/25 1225    OLANZapine (ZyPREXA) IM  injection 2.5 mg  Dose: 2.5 mg  Freq: Once Route: IM  Start: 06/15/25 1700 End: 06/15/25 1713      ED Triage Vitals   Temperature Pulse Respirations Blood Pressure SpO2 Pain Score   06/14/25 1506 06/14/25 1510 06/14/25 1510 06/14/25 1510 06/14/25 1510 06/14/25 1510   100 °F (37.8 °C) 104 15 143/66 96 % No Pain     Weight (last 2 days)       Date/Time Weight    06/16/25 1030 114 (251)    06/14/25 17:38:15 114 (251.99)    06/14/25 1510 114 (251.99)            Vital Signs (last 3 days)       Date/Time Temp Pulse Resp BP MAP (mmHg) SpO2 O2 Device Patient Position - Orthostatic VS Iris Coma Scale Score Pain    06/16/25 1330 97 °F (36.1 °C) 95 -- 154/83 107 98 % -- -- -- --    06/16/25 1225 -- -- -- -- -- -- -- -- -- 6 06/16/25 11:33:26 -- 108 -- 149/93 112 98 % -- Sitting -- --    06/16/25 1116 -- -- -- -- -- -- -- -- -- 6 06/16/25 1115 -- -- -- -- -- -- -- -- -- 6 06/16/25 1114 -- 99 -- -- -- -- -- -- -- --    06/16/25 1038 -- -- -- -- -- -- -- -- -- 6 06/16/25 1030 -- 99 -- 140/74 -- 98 % -- -- -- --    06/16/25 0930 97.2 °F (36.2 °C) 99 18 140/74 96 99 % -- -- 14 --    06/16/25 0910 97.2 °F (36.2 °C) 99 18 140/74 96 99 % -- -- -- --    06/16/25 0802 97.3 °F (36.3 °C) 99 20 125/58 80 97 % -- -- -- --    06/16/25 07:36:13 97.3 °F (36.3 °C) 99 20 125/58 80 97 % -- -- -- --    06/16/25 0715 -- -- -- -- -- -- -- -- -- No Pain    06/16/25 0530 97.3 °F (36.3 °C) 106 18 126/75 92 98 % None (Room air) Lying 14 --    06/16/25 0330 97 °F (36.1 °C) 92 18 126/80 90 97 % None (Room air) Lying 14 --    06/16/25 0130 97 °F (36.1 °C) 98 18 126/79 93 97 % None (Room air) Lying 14 --    06/15/25 2330 97.2 °F (36.2 °C) 97 18 137/72 95 98 % None (Room air) Lying 14 --    06/15/25 2130 97.5 °F (36.4 °C) 94 18 133/74 94 96 % None (Room air) Lying 14 --    06/15/25 2030 97.5 °F (36.4 °C) 93 18 104/58 73 92 % None (Room air) Lying 14 --    06/15/25 1930 97.2 °F (36.2 °C) 94 20 115/59 78 96 % None (Room air) Lying 14 No Pain     06/15/25 18:30:33 97.3 °F (36.3 °C) 95 17 151/78 102 98 % -- Lying -- --    06/15/25 1830 -- -- -- -- -- -- -- -- 14 --    06/15/25 16:49:40 97.7 °F (36.5 °C) 98 20 142/97 112 96 % None (Room air) Lying -- --    06/15/25 1105 -- 97 -- -- -- -- -- -- -- --    06/15/25 0827 -- -- -- -- -- -- -- -- -- No Pain    06/15/25 07:07:42 97.3 °F (36.3 °C) 98 20 126/79 95 94 % -- -- -- --    06/15/25 0119 -- -- -- -- -- -- -- -- -- 7    06/15/25 0117 -- -- -- -- -- -- -- -- -- 7    06/14/25 21:59:40 97.5 °F (36.4 °C) 103 18 131/68 89 96 % -- -- -- --    06/14/25 2112 -- -- -- -- -- -- None (Room air) -- 13 --    06/14/25 1802 -- -- -- -- -- 94 % None (Room air) -- 13 --    06/14/25 1745 -- -- -- -- -- -- -- -- -- No Pain    06/14/25 17:38:15 97.5 °F (36.4 °C) 97 18 125/67 86 94 % -- -- -- --    06/14/25 1610 -- 102 16 143/78 -- 95 % None (Room air) Lying 14 --    06/14/25 1607 99.7 °F (37.6 °C) -- -- -- -- -- -- -- -- --    06/14/25 1510 -- 104 15 143/66 95 96 % None (Room air) Lying 14 No Pain    06/14/25 1508 -- -- -- -- -- -- -- -- 14 --    06/14/25 1506 100 °F (37.8 °C) -- -- -- -- -- -- -- -- --              Pertinent Labs/Diagnostic Test Results:   Radiology:  MRI brain w contrast   Final Interpretation by Triston Zacarias MD (06/16 0902)      No pathologic enhancement.      Workstation performed: [id number]         MRI brain wo contrast   Final Interpretation by Raleigh Clark MD (06/16 0947)   Addendum (preliminary) 1 of 1 by Raleigh Clark MD (06/16 0947)   ADDENDUM:      T2/FLAIR hyperintense signal around the dorsomedial thalami and    periaqueductal grey matter could also suggest a component of superimposed    Wernicke's encephalopathy on a background of hepatic encephalopathy.      This was discussed with Dr. Bell on 6/16/225 at 9:40 AM.      Final      Diffusely increased cortical signal abnormality involving the bilateral cerebral and cerebellar hemispheres, basal ganglia, thalami, and brainstem. Findings are  worrisome for an underlying severe toxic/metabolic encephalopathy. Given patient's history of    cirrhosis, findings are highly concerning for severe hepatic encephalopathy/hyperammonemia. Additional differential considerations include seizure related changes or possibly an underlying infections/inflammatory encephalitis. Recommend further    evaluation with a contrast enhanced brain MRI for full characterization.      Workstation performed: [id number]         CT head wo contrast   Final Interpretation by Raghav Durant MD (06/15 1610)      No acute intracranial abnormality.                  Workstation performed: [id number]         XR chest 1 view portable   Final Interpretation by Katie Otero MD (06/15 0754)      No acute cardiopulmonary disease. Left upper lobe groundglass opacity better shown on CT.            Workstation performed: [id number]         CT stroke alert brain   Final Interpretation by Raleigh Clark MD (06/14 1536)      No hemorrhage or CT evidence of an acute cortical infarct      Findings were discussed with Dr. Blunt on 6/14/25 at 3:34 PM      Workstation performed: [id number]         CTA stroke alert (head/neck)   Final Interpretation by Raleigh Clark MD (06/14 1543)   1.  No intracranial large vessel occlusion or hemodynamically significant stenosis in the neck.   2.  Moderate narrowing at the P2-P3 junction of the left PCA.   3.  Unchanged groundglass mass in the left upper lobe.      Findings were discussed with Dr. Pérez on 6/14/25 at 3:37 PM.      Workstation performed: [id number]              Results from last 7 days   Lab Units 06/16/25  0530 06/14/25  1512   WBC Thousand/uL 1.96* 2.78*   HEMOGLOBIN g/dL 9.0* 9.6*   HEMATOCRIT % 29.0* 29.9*   PLATELETS Thousands/uL 60* 57*         Results from last 7 days   Lab Units 06/16/25  0530 06/15/25  1549 06/15/25  0503 06/14/25  2126 06/14/25  1512   SODIUM mmol/L 141 142 142 140 141   POTASSIUM mmol/L 3.6 3.4* 2.9* 2.9* 2.8*   CHLORIDE  mmol/L 107 104 102 101 97   CO2 mmol/L 28 29 32 32 35*   ANION GAP mmol/L 6 9 8 7 9   BUN mg/dL 9 11 13 12 12   CREATININE mg/dL 0.88 1.12 1.09 0.95 1.03   EGFR ml/min/1.73sq m 74 55 57 67 61   CALCIUM mg/dL 8.5 8.8 8.7 8.4 8.5   MAGNESIUM mg/dL 1.9  --  2.2  --  1.7*     Results from last 7 days   Lab Units 06/16/25  0530 06/15/25  1549 06/15/25  0503 06/14/25  1512   AST U/L 49*  --   --   --    ALT U/L 19  --   --   --    ALK PHOS U/L 218*  --   --   --    TOTAL PROTEIN g/dL 6.7  --   --   --    ALBUMIN g/dL 2.8*  --   --   --    TOTAL BILIRUBIN mg/dL 2.18*  --   --   --    AMMONIA umol/L 50 57 118* 135*     Results from last 7 days   Lab Units 06/14/25  1510   POC GLUCOSE mg/dl 105     Results from last 7 days   Lab Units 06/16/25  0530 06/15/25  1549 06/15/25  0503 06/14/25  2126 06/14/25  1512   GLUCOSE RANDOM mg/dL 120 128 117 127 109        Results from last 7 days   Lab Units 06/14/25  1512   HS TNI 0HR ng/L 17         Results from last 7 days   Lab Units 06/14/25  1512   PROTIME seconds 21.0*   INR  1.78*   PTT seconds 37*          Past Medical History[1]  Present on Admission:   Hepatic encephalopathy (HCC)   Hypokalemia   Liver cirrhosis secondary to HYATT (nonalcoholic steatohepatitis) (HCC)   History of malignant carcinoid tumor of small intestine   Vision loss   Pancytopenia (HCC)      Admitting Diagnosis: Hypokalemia [E87.6]  Hypomagnesemia [E83.42]  Altered mental status [R41.82]  Altered mental status, unspecified altered mental status type [R41.82]  AMS (altered mental status) [R41.82]  Age/Sex: 54 y.o. female    Network Utilization Review Department  ATTENTION: Please call with any questions or concerns to [phone] and carefully listen to the prompts so that you are directed to the right person. All voicemails are confidential.   For Discharge needs, contact Care Management DC Support Team at [phone] opt. 2  Send all requests for admission clinical reviews, approved or denied  determinations and any other requests to dedicated fax number below belonging to the campus where the patient is receiving treatment. List of dedicated fax numbers for the Facilities:  FACILITY NAME UR FAX NUMBER   ADMISSION DENIALS (Administrative/Medical Necessity) [phone]   DISCHARGE SUPPORT TEAM (NETWORK) [phone]   PARENT CHILD HEALTH (Maternity/NICU/Pediatrics) [phone]   [facility] [phone]   [facility] [phone]   Replaced by [facility] [phone]   [facility] [phone]   [facility] [phone]   General acute hospital [phone]   [facility] [phone]   [facility] [phone]   [facility] [phone]   [facility] [phone]   [facility] [phone]   [facility] [phone]              [1]   Past Medical History:  Diagnosis Date    Back pain     Carcinoid tumor     neuroendocrine    CHF (congestive heart failure) (HCC)     Cirrhosis of liver (HCC)     Hypertension     Sarcoidosis

## 2025-06-15 NOTE — ASSESSMENT & PLAN NOTE
Patient presented yesterday with confusion, initially presumed to be hepatic encephalopathy. (See stroke alert note from yesterday).  Ammonia was normalized. However the patient remained intermittently confused. More over she stated that she could not see anything this morning, for which we were consulted.    Upon our eval, there are numerous exam features that are highly suggestive of psychogenic origin, including intermittent stuttering and non-organic pattern of vision loss (see exam below).    Considering limitation of tele exam, ee recommend obtaining MRI brain w/o contrast. If negative, then patient can be reassured and no further work-up noted.

## 2025-06-15 NOTE — OCCUPATIONAL THERAPY NOTE
Occupational Therapy Cancel Note     Patient Name: Bailey Olsen  Today's Date: 6/15/2025  Problem List  Principal Problem:    Hepatic encephalopathy (HCC)  Active Problems:    Liver cirrhosis secondary to HYATT (nonalcoholic steatohepatitis) (HCC)    History of malignant carcinoid tumor of small intestine    Hypokalemia            06/15/25 1321   OT Last Visit   OT Visit Date 06/15/25   Note Type   Note type Evaluation;Cancelled Session   Cancel Reasons Medical status         Chart reviewed. Attempted to see pt for OT session this afternoon. Pt extremely anxious, hyperventilating, having word salad/difficulty expressing herself, and is unable to be redirected. Pt's spouse Cristian at pt's bedside attempting to console pt who expressed he feels pt is not currently appropriate for therapy evaluation. SARIAH Sanchez notified. Will continue to follow case and address as appropriate and as time allows.    Cristian Ramachandran, OTR/L

## 2025-06-16 ENCOUNTER — APPOINTMENT (INPATIENT)
Dept: ULTRASOUND IMAGING | Facility: HOSPITAL | Age: 55
DRG: 642 | End: 2025-06-16
Payer: MEDICARE

## 2025-06-16 ENCOUNTER — APPOINTMENT (OUTPATIENT)
Dept: NON INVASIVE DIAGNOSTICS | Facility: HOSPITAL | Age: 55
DRG: 642 | End: 2025-06-16
Payer: MEDICARE

## 2025-06-16 ENCOUNTER — APPOINTMENT (OUTPATIENT)
Dept: MRI IMAGING | Facility: HOSPITAL | Age: 55
DRG: 642 | End: 2025-06-16
Payer: MEDICARE

## 2025-06-16 LAB
AFP-TM SERPL-MCNC: 3.37 NG/ML (ref 0–9)
ALBUMIN SERPL BCG-MCNC: 2.8 G/DL (ref 3.5–5)
ALP SERPL-CCNC: 218 U/L (ref 34–104)
ALT SERPL W P-5'-P-CCNC: 19 U/L (ref 7–52)
AMMONIA PLAS-SCNC: 50 UMOL/L (ref 18–72)
ANION GAP SERPL CALCULATED.3IONS-SCNC: 6 MMOL/L (ref 4–13)
AORTIC VALVE MEAN VELOCITY: 18.2 M/S
AST SERPL W P-5'-P-CCNC: 49 U/L (ref 13–39)
ATRIAL RATE: 105 BPM
AV LVOT MEAN GRADIENT: 9 MMHG
AV LVOT PEAK GRADIENT: 13 MMHG
AV MEAN PRESS GRAD SYS DOP V1V2: 14 MMHG
AV PEAK GRADIENT: 23 MMHG
AV VELOCITY RATIO: 0.73
AV VMAX SYS DOP: 2.4 M/S
BILIRUB SERPL-MCNC: 2.18 MG/DL (ref 0.2–1)
BILIRUB UR QL STRIP: NEGATIVE
BSA FOR ECHO PROCEDURE: 2.2 M2
BUN SERPL-MCNC: 9 MG/DL (ref 5–25)
CALCIUM ALBUM COR SERPL-MCNC: 9.5 MG/DL (ref 8.3–10.1)
CALCIUM SERPL-MCNC: 8.5 MG/DL (ref 8.4–10.2)
CHLORIDE SERPL-SCNC: 107 MMOL/L (ref 96–108)
CHOLEST SERPL-MCNC: 71 MG/DL (ref ?–200)
CLARITY UR: CLEAR
CO2 SERPL-SCNC: 28 MMOL/L (ref 21–32)
COLOR UR: YELLOW
CREAT SERPL-MCNC: 0.88 MG/DL (ref 0.6–1.3)
DOP CALC AO VTI: 47.92 CM
DOP CALC LVOT PEAK VEL VTI: 35.17 CM
DOP CALC LVOT PEAK VEL: 1.81 M/S
DOP CALC RVOT PEAK VEL: 2.3 M/S
DOP CALC RVOT VTI: 44.37 CM
E WAVE DECELERATION TIME: 221 MS
E/A RATIO: 0.76
ERYTHROCYTE [DISTWIDTH] IN BLOOD BY AUTOMATED COUNT: 18.4 % (ref 11.6–15.1)
EST. AVERAGE GLUCOSE BLD GHB EST-MCNC: 100 MG/DL
FRACTIONAL SHORTENING: 38 (ref 28–44)
GFR SERPL CREATININE-BSD FRML MDRD: 74 ML/MIN/1.73SQ M
GLUCOSE SERPL-MCNC: 120 MG/DL (ref 65–140)
GLUCOSE UR STRIP-MCNC: NEGATIVE MG/DL
HBA1C MFR BLD: 5.1 %
HCT VFR BLD AUTO: 29 % (ref 34.8–46.1)
HDLC SERPL-MCNC: 22 MG/DL
HGB BLD-MCNC: 9 G/DL (ref 11.5–15.4)
HGB UR QL STRIP.AUTO: NEGATIVE
INTERVENTRICULAR SEPTUM IN DIASTOLE (PARASTERNAL SHORT AXIS VIEW): 1.1 CM
INTERVENTRICULAR SEPTUM: 1.1 CM (ref 0.6–1.1)
KETONES UR STRIP-MCNC: NEGATIVE MG/DL
LAAS-AP2: 23.3 CM2
LAAS-AP4: 21.4 CM2
LDLC SERPL CALC-MCNC: 40 MG/DL (ref 0–100)
LEFT ATRIUM AREA SYSTOLE SINGLE PLANE A4C: 21.2 CM2
LEFT ATRIUM SIZE: 3.4 CM
LEFT ATRIUM VOLUME (MOD BIPLANE): 73 ML
LEFT ATRIUM VOLUME INDEX (MOD BIPLANE): 33.5 ML/M2
LEFT INTERNAL DIMENSION IN SYSTOLE: 2.9 CM (ref 2.1–4)
LEFT VENTRICLE DIASTOLIC VOLUME (MOD BIPLANE): 134 ML
LEFT VENTRICLE DIASTOLIC VOLUME INDEX (MOD BIPLANE): 60.9 ML/M2
LEFT VENTRICLE SYSTOLIC VOLUME (MOD BIPLANE): 46 ML
LEFT VENTRICLE SYSTOLIC VOLUME INDEX (MOD BIPLANE): 20.9 ML/M2
LEFT VENTRICULAR INTERNAL DIMENSION IN DIASTOLE: 4.7 CM (ref 3.5–6)
LEFT VENTRICULAR POSTERIOR WALL IN END DIASTOLE: 1.1 CM
LEFT VENTRICULAR STROKE VOLUME: 68 ML
LEUKOCYTE ESTERASE UR QL STRIP: NEGATIVE
LV EF BIPLANE MOD: 65 %
LV EF US.2D.A4C+ESTIMATED: 68 %
LVSV (TEICH): 68 ML
MAGNESIUM SERPL-MCNC: 1.9 MG/DL (ref 1.9–2.7)
MCH RBC QN AUTO: 28.8 PG (ref 26.8–34.3)
MCHC RBC AUTO-ENTMCNC: 31 G/DL (ref 31.4–37.4)
MCV RBC AUTO: 93 FL (ref 82–98)
MV E'TISSUE VEL-LAT: 13 CM/S
MV PEAK A VEL: 1.29 M/S
MV PEAK E VEL: 98 CM/S
MV STENOSIS PRESSURE HALF TIME: 64 MS
MV VALVE AREA P 1/2 METHOD: 3.44
NITRITE UR QL STRIP: NEGATIVE
P AXIS: 29 DEGREES
PH UR STRIP.AUTO: 5.5 [PH]
PLATELET # BLD AUTO: 60 THOUSANDS/UL (ref 149–390)
PMV BLD AUTO: 10.2 FL (ref 8.9–12.7)
POTASSIUM SERPL-SCNC: 3.6 MMOL/L (ref 3.5–5.3)
PR INTERVAL: 144 MS
PROT SERPL-MCNC: 6.7 G/DL (ref 6.4–8.4)
PROT UR STRIP-MCNC: NEGATIVE MG/DL
QRS AXIS: 19 DEGREES
QRSD INTERVAL: 100 MS
QT INTERVAL: 436 MS
QTC INTERVAL: 576 MS
RBC # BLD AUTO: 3.12 MILLION/UL (ref 3.81–5.12)
RIGHT ATRIUM AREA SYSTOLE A4C: 15 CM2
RIGHT VENTRICLE ID DIMENSION: 4.1 CM
SL CV LEFT ATRIUM LENGTH A2C: 5.5 CM
SL CV LV EF: 75
SL CV PED ECHO LEFT VENTRICLE DIASTOLIC VOLUME (MOD BIPLANE) 2D: 101 ML
SL CV PED ECHO LEFT VENTRICLE SYSTOLIC VOLUME (MOD BIPLANE) 2D: 33 ML
SL CV RVOT MAX GRADIENT: 21 MMHG
SL CV RVOT MEAN GRADIENT: 15 MMHG
SL CV RVOT VMEAN: 1.94 M/S
SODIUM SERPL-SCNC: 141 MMOL/L (ref 135–147)
SP GR UR STRIP.AUTO: 1.01 (ref 1–1.03)
T WAVE AXIS: 18 DEGREES
TR MAX PG: 21 MMHG
TR PEAK VELOCITY: 2.3 M/S
TRICUSPID ANNULAR PLANE SYSTOLIC EXCURSION: 2.5 CM
TRICUSPID VALVE PEAK REGURGITATION VELOCITY: 2.27 M/S
TRIGL SERPL-MCNC: 46 MG/DL (ref ?–150)
UROBILINOGEN UR QL STRIP.AUTO: 0.2 E.U./DL
VENTRICULAR RATE: 105 BPM
WBC # BLD AUTO: 1.96 THOUSAND/UL (ref 4.31–10.16)

## 2025-06-16 PROCEDURE — 99232 SBSQ HOSP IP/OBS MODERATE 35: CPT

## 2025-06-16 PROCEDURE — 83036 HEMOGLOBIN GLYCOSYLATED A1C: CPT

## 2025-06-16 PROCEDURE — 93306 TTE W/DOPPLER COMPLETE: CPT

## 2025-06-16 PROCEDURE — 93010 ELECTROCARDIOGRAM REPORT: CPT | Performed by: INTERNAL MEDICINE

## 2025-06-16 PROCEDURE — 80053 COMPREHEN METABOLIC PANEL: CPT

## 2025-06-16 PROCEDURE — 97167 OT EVAL HIGH COMPLEX 60 MIN: CPT

## 2025-06-16 PROCEDURE — 93306 TTE W/DOPPLER COMPLETE: CPT | Performed by: INTERNAL MEDICINE

## 2025-06-16 PROCEDURE — 82105 ALPHA-FETOPROTEIN SERUM: CPT | Performed by: PHYSICIAN ASSISTANT

## 2025-06-16 PROCEDURE — 92523 SPEECH SOUND LANG COMPREHEN: CPT

## 2025-06-16 PROCEDURE — 97129 THER IVNTJ 1ST 15 MIN: CPT

## 2025-06-16 PROCEDURE — 80061 LIPID PANEL: CPT

## 2025-06-16 PROCEDURE — 76705 ECHO EXAM OF ABDOMEN: CPT

## 2025-06-16 PROCEDURE — 97163 PT EVAL HIGH COMPLEX 45 MIN: CPT

## 2025-06-16 PROCEDURE — 70552 MRI BRAIN STEM W/DYE: CPT

## 2025-06-16 PROCEDURE — 85027 COMPLETE CBC AUTOMATED: CPT

## 2025-06-16 PROCEDURE — A9585 GADOBUTROL INJECTION: HCPCS | Performed by: FAMILY MEDICINE

## 2025-06-16 PROCEDURE — 83735 ASSAY OF MAGNESIUM: CPT

## 2025-06-16 PROCEDURE — 81003 URINALYSIS AUTO W/O SCOPE: CPT | Performed by: EMERGENCY MEDICINE

## 2025-06-16 PROCEDURE — 82140 ASSAY OF AMMONIA: CPT

## 2025-06-16 RX ORDER — KETOROLAC TROMETHAMINE 30 MG/ML
15 INJECTION, SOLUTION INTRAMUSCULAR; INTRAVENOUS ONCE
Status: COMPLETED | OUTPATIENT
Start: 2025-06-16 | End: 2025-06-16

## 2025-06-16 RX ORDER — SPIRONOLACTONE 25 MG/1
25 TABLET ORAL DAILY
Status: DISCONTINUED | OUTPATIENT
Start: 2025-06-16 | End: 2025-06-17 | Stop reason: HOSPADM

## 2025-06-16 RX ORDER — CYCLOBENZAPRINE HCL 10 MG
10 TABLET ORAL EVERY 8 HOURS SCHEDULED
Status: DISCONTINUED | OUTPATIENT
Start: 2025-06-16 | End: 2025-06-17 | Stop reason: HOSPADM

## 2025-06-16 RX ORDER — GADOBUTROL 604.72 MG/ML
11 INJECTION INTRAVENOUS
Status: COMPLETED | OUTPATIENT
Start: 2025-06-16 | End: 2025-06-16

## 2025-06-16 RX ORDER — TORSEMIDE 20 MG/1
20 TABLET ORAL DAILY
Status: DISCONTINUED | OUTPATIENT
Start: 2025-06-16 | End: 2025-06-17 | Stop reason: HOSPADM

## 2025-06-16 RX ORDER — BISACODYL 5 MG/1
5 TABLET, DELAYED RELEASE ORAL ONCE
Status: COMPLETED | OUTPATIENT
Start: 2025-06-16 | End: 2025-06-16

## 2025-06-16 RX ORDER — MAGNESIUM SULFATE HEPTAHYDRATE 40 MG/ML
2 INJECTION, SOLUTION INTRAVENOUS ONCE
Status: COMPLETED | OUTPATIENT
Start: 2025-06-16 | End: 2025-06-16

## 2025-06-16 RX ORDER — KETOROLAC TROMETHAMINE 30 MG/ML
15 INJECTION, SOLUTION INTRAMUSCULAR; INTRAVENOUS EVERY 6 HOURS PRN
Status: DISCONTINUED | OUTPATIENT
Start: 2025-06-16 | End: 2025-06-17 | Stop reason: HOSPADM

## 2025-06-16 RX ADMIN — LACTULOSE 30 G: 10 SOLUTION ORAL at 21:34

## 2025-06-16 RX ADMIN — RIFAXIMIN 550 MG: 550 TABLET ORAL at 21:28

## 2025-06-16 RX ADMIN — ATORVASTATIN CALCIUM 40 MG: 40 TABLET, FILM COATED ORAL at 17:59

## 2025-06-16 RX ADMIN — GADOBUTROL 11 ML: 604.72 INJECTION INTRAVENOUS at 08:57

## 2025-06-16 RX ADMIN — CYCLOBENZAPRINE HYDROCHLORIDE 10 MG: 10 TABLET, FILM COATED ORAL at 14:36

## 2025-06-16 RX ADMIN — CYCLOBENZAPRINE HYDROCHLORIDE 10 MG: 10 TABLET, FILM COATED ORAL at 21:28

## 2025-06-16 RX ADMIN — LACTULOSE 30 G: 10 SOLUTION ORAL at 10:34

## 2025-06-16 RX ADMIN — POLYETHYLENE GLYCOL 3350 17 G: 17 POWDER, FOR SOLUTION ORAL at 10:37

## 2025-06-16 RX ADMIN — AMITRIPTYLINE HYDROCHLORIDE 50 MG: 25 TABLET, FILM COATED ORAL at 21:28

## 2025-06-16 RX ADMIN — LACTULOSE 30 G: 10 SOLUTION ORAL at 14:38

## 2025-06-16 RX ADMIN — SPIRONOLACTONE 25 MG: 25 TABLET ORAL at 10:33

## 2025-06-16 RX ADMIN — RIFAXIMIN 550 MG: 550 TABLET ORAL at 10:33

## 2025-06-16 RX ADMIN — MAGNESIUM SULFATE HEPTAHYDRATE 2 G: 40 INJECTION, SOLUTION INTRAVENOUS at 14:36

## 2025-06-16 RX ADMIN — KETOROLAC TROMETHAMINE 15 MG: 30 INJECTION, SOLUTION INTRAMUSCULAR; INTRAVENOUS at 12:25

## 2025-06-16 RX ADMIN — TORSEMIDE 20 MG: 20 TABLET ORAL at 10:33

## 2025-06-16 RX ADMIN — BISACODYL 5 MG: 5 TABLET, COATED ORAL at 14:36

## 2025-06-16 RX ADMIN — LACTULOSE 30 G: 10 SOLUTION ORAL at 17:59

## 2025-06-16 RX ADMIN — PANTOPRAZOLE SODIUM 40 MG: 40 TABLET, DELAYED RELEASE ORAL at 05:32

## 2025-06-16 NOTE — PHYSICAL THERAPY NOTE
" PHYSICAL THERAPY EVALUATION        NAME:  Bailey Olsen  DATE: 06/16/25    AGE:   54 y.o.  Mrn:   [id number]  ADMIT DX:  Hypokalemia [E87.6]  Hypomagnesemia [E83.42]  Altered mental status [R41.82]  Altered mental status, unspecified altered mental status type [R41.82]  AMS (altered mental status) [R41.82]    Past Medical History[1]  Length Of Stay: 1  Performed at least 2 patient identifiers during session: Name and Birthday       PHYSICAL THERAPY EVALUATION:       06/16/25 1115   PT Last Visit   PT Visit Date 06/16/25   Note Type   Note type Evaluation   Pain Assessment   Pain Assessment Tool FLACC   Pain Score 6   Pain Location/Orientation   (\"my head and left side of my body\")   Pain Rating: FLACC (Rest) - Face 0   Pain Rating: FLACC (Rest) - Legs 0   Pain Rating: FLACC (Rest) - Activity 0   Pain Rating: FLACC (Rest) - Cry 0   Pain Rating: FLACC (Rest) - Consolability 0   Score: FLACC (Rest) 0   Pain Rating: FLACC (Activity) - Face 1   Pain Rating: FLACC (Activity) - Legs 0   Pain Rating: FLACC (Activity) - Activity 0   Pain Rating: FLACC (Activity) - Cry 0   Pain Rating: FLACC (Activity) - Consolability 0   Score: FLACC (Activity) 1   Restrictions/Precautions   Weight Bearing Precautions Per Order No   Other Precautions Chair Alarm;Bed Alarm;Fall Risk;Pain   Home Living   Type of Home House   Home Layout Two level;Performs ADLs on one level;Able to live on main level with bedroom/bathroom  (1 FABRICIO)   Bathroom Shower/Tub Tub/shower unit   Bathroom Toilet Raised   Bathroom Equipment Grab bars in shower;Commode;Grab bars around toilet;Toilet raiser   Home Equipment Walker;Cane;Wheelchair-manual;Electric scooter;Crutches   Additional Comments Sleeps in regular bed   Prior Function   Level of [place] Independent with functional mobility;Needs assistance with ADLs;Needs assistance with IADLS   Lives With Spouse;Daughter   Receives Help From Family   IADLs Family/Friend/Other provides transportation;Independent " "with meal prep;Independent with medication management   Falls in the last 6 months   (2-6 (unclear))   Comments Mod (I) level surfaces with RW.  States she was doing great leaving the STR, but a \"switch flipped\" upon returning home, prompting return to ED.   General   Additional Pertinent History 24 ED/hospital encounters in the last 2 yrs. with 10 being in the last 6 months.  D/C'd from STR on 6/13/25 and was not home >24 hrs prior to returning to ED.  States she totally lost her vision for 24 hr period -> states her vision is now back to normal because she got her contacts.   Family/Caregiver Present Yes  (spouse, sister & brother in law)   Cognition   Overall Cognitive Status WFL   Arousal/Participation Cooperative   Orientation Level Oriented X4   Following Commands Follows one step commands with increased time or repetition   Comments intermittently stutters   Subjective   Subjective \"I remember you.\"   RLE Assessment   RLE Assessment WFL   LLE Assessment   LLE Assessment WFL   Light Touch   RLE Light Touch Grossly intact   LLE Light Touch Grossly intact   Bed Mobility   Supine to Sit 4  Minimal assistance   Additional items Assist x 1;Increased time required;Verbal cues;HOB elevated   Transfers   Sit to Stand 4  Minimal assistance   Additional items Assist x 1;Increased time required;Verbal cues   Stand to Sit 4  Minimal assistance   Additional items Assist x 1;Increased time required;Verbal cues   Stand pivot 4  Minimal assistance   Additional items Assist x 1;Increased time required;Verbal cues   Additional Comments RW   Ambulation/Elevation   Gait pattern Improper Weight shift;Excessively slow;Short stride;Decreased hip extension   Gait Assistance 4  Minimal assist   Additional items Verbal cues   Assistive Device Rolling walker   Distance 11 ft x 1; 17 ft x 1   Balance   Static Sitting Good   Dynamic Sitting Fair +   Static Standing Fair   Dynamic Standing Fair -   Ambulatory Fair -  (RW)   Endurance " Deficit   Endurance Deficit Yes   Activity Tolerance   Activity Tolerance Patient limited by fatigue   Medical Staff Made Aware OT Cristian   Assessment   Prognosis Guarded   Barriers to Discharge Other (Comment)   Barriers to Discharge Comments fall risk, home alone at least 10 hrs of the day, very high re-admission risk   Goals   Patient Goals go home   STG Expiration Date 06/30/25   PT Treatment Day 0   Plan   Treatment/Interventions Functional transfer training;LE strengthening/ROM;Elevations;Therapeutic exercise;Endurance training;Patient/family training;Equipment eval/education;Bed mobility;Gait training;Compensatory technique education;OT   PT Frequency 2-3x/wk   Discharge Recommendation   Rehab Resource Intensity Level, PT II (Moderate Resource Intensity)   AM-PAC Basic Mobility Inpatient   Turning in Flat Bed Without Bedrails 3   Lying on Back to Sitting on Edge of Flat Bed Without Bedrails 3   Moving Bed to Chair 3   Standing Up From Chair Using Arms 3   Walk in Room 3   Climb 3-5 Stairs With Railing 3   Basic Mobility Inpatient Raw Score 18   Basic Mobility Standardized Score 41.05   [facility] Highest Level Of Mobility   -HLM Goal 6: Walk 10 steps or more   -HLM Achieved 6: Walk 10 steps or more   End of Consult   Patient Position at End of Consult Bedside chair;Bed/Chair alarm activated;All needs within reach   End of Consult Comments with OT     Pt requires skilled PT/OT co-eval due to medical complexity, safety concerns, fall risk, assistance with mobility and/or behavioral impairments.    (Please find full objective findings from PT assessment regarding body systems outlined above).     Assessment: Pt is a 54 y.o. female who presented to [facility] with Hepatic encephalopathy (HCC). Order placed for PT services. Pt seen for PT evaluation on 06/16/25. Refer to comprehensive exam completed above. Upon evaluation: Pt is presenting with functional deficits including  decreased strength, decreased endurance, decreased activity tolerance, impaired balance, gait deviations, pain, difficulty problem solving, and obesity, requiring min assistance for bed mobility and min assistance for transfers and ambulation with RW. Pt's clinical presentation is currently unstable/unpredictable given functional impairments listed above as well as medical complications of abnormal H&H, abnormal WBCs, abnormal CBC, multiple readmissions, need for input for mobility technique/safety, and polypharmacy. Pt's PMHx and comorbidities that may impact pt's participation and functional progress include:CHF, HTN, obesity, cancer history and/or treatment, fall(s) history, and liver cirrhosis. Personal factors affecting pt that may cause a barrier to progress and discharge include need for assistive device, stair(s) to enter home environment, limited availability of support from family/caregiver, inability to ambulate community distances, need for assistance with IADLs, frequent falls/fall history, near miss falls, health literacy, hx of noncompliance, anxiety, and depression. Pt's rehab potential is Guarded as indicated by barriers and findings above. Current Physical Therapy recommendation at time of discharge is Level II (Moderate Resource Intensity. Pt will benefit from continued skilled PT services to address deficits as defined above with focus on Functional transfer training, LE strengthening/ROM, Elevations, Therapeutic exercise, Endurance training, Patient/family training, Equipment eval/education, Bed mobility, Gait training, Compensatory technique education, OT to maximize level of functional mobility to facilitate return toward PLOF and improved QOL. Recommend trial with walker as appropriate.     The patient's AM-PAC Basic Mobility Inpatient Short Form Raw Score is 18. A Raw score of greater than 16 suggests the patient may benefit from discharge to home. Please also refer to the recommendation of  the Physical Therapist for safe discharge planning.    Goals: Pt will perform bed mobility tasks with modified Independent to reposition in bed and prepare for transfers. Pt will perform transfers with modified Independent to increase Indep in home environment and decrease burden of care and prepare for ambulation. Pt will ambulate with RW for >/= 250 ft with  modified Independent  to decrease risk for falls, improve activity tolerance, and improve gait quality and to access home environment. Pt will complete 1 step with RW with SPV to return to home with FABRICIO.        Otoniel Lafleur, PT,DPT         [1]   Past Medical History:  Diagnosis Date    Back pain     Carcinoid tumor     neuroendocrine    CHF (congestive heart failure) (HCC)     Cirrhosis of liver (HCC)     Hypertension     Sarcoidosis

## 2025-06-16 NOTE — CONSULTS
Consultation - Gastroenterology   Name: Bailey Olsen 54 y.o. female I MRN: [id number]  Unit/Bed#: -01 I Date of Admission: 6/14/2025   Date of Service: 6/16/2025 I Hospital Day: 1   Inpatient consult to gastroenterology  Consult performed by: Rad Manzano PA-C  Consult ordered by: Laura Bell PA-C      Physician Requesting Evaluation: Idalia Stuart MD   Reason for Evaluation / Principal Problem: Cirrhosis with HE    Assessment & Plan  Hepatic encephalopathy (HCC)  Pt well known to our service. Hx of cirrhosis with HE. There is concern for noncompliance with her HE regimen at home. She apparently was doing well when in rehab but developed HE symptoms about a day after being discharged.   GI will continue to follow. No change to regimen for now.   Closely monitor BMs while admitted  Will check liver imaging which she is due for      Child Wilder class 9B  Ascites  Diuretics on torsemide and aldactone at home- held initially this admission  Low Na diet, 2g/day  Hepatic encephalopathy  Ammonia 118 on admission  Hx of ?non compliance with lactulose at home   Continue lactulose 30g QID and xifaxan 550mg BID  Esophageal varices  EGD 1/18/25 no EV   HCC screening  CT A/P no mass 12/2/24   Due for repeat imaging 6/2025- will order US and AFP  Patient's MELD Score is: 16    MELD Score 90-day mortality   <=9 1.9%   10-19 6.0%   20-29 19.6%   30-39 52.6%   >=40 71.3%     MELD Score Component Values:  Component Value Date   Creatinine: 0.88 mg/dL 6/16/2025   Dialysis at least twice   in the past week  Or CVVHD for >=24 hours   in the past week:     No    Bilirubin, total: 2.18 mg/dL 6/16/2025   INR: 1.78 6/14/2025   Sodium: 141 mmol/L 6/16/2025      Liver cirrhosis secondary to HYATT (nonalcoholic steatohepatitis) (HCC)  Pt never followed up outpatient despite numerous documented calls and letters being sent. I confirmed that her address and phone number are correct with her and her  today. Will need outpatient  hepatology follow up.   History of malignant carcinoid tumor of small intestine    Pancytopenia (HCC)        History of Present Illness   HPI:  Bailey Olsen is a 54 y.o. 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 Ammonia elevated again. She has been seen several times this year for HE exacerbations. She was in rehab until Friday following her last admission about 2 weeks ago and then on Saturday she developed AMS. Her  reports that she has been having BMs and is compliant with lactulose and xifaxan. She was disoriented with labile mood according to him and is improved today compared to Saturday/Sunday.    Pt is oriented to person and place but not time. She has noticeable asterixis and appears to have slight icterus.     No noted GIB, no noted infections or illness. Denies alcohol or recreational drug use. No change in meds in the last few days.    Endoscopic risk assessment:  Anticoagulation use: n  Diabetes medication: n  CVS history: n  Abdominal surgeries: see HPI  Sleep apnea: n  O2 use: n    Past Medical History[1]  Past Surgical History[2]  Social History   Social History     Substance and Sexual Activity   Alcohol Use Never     Social History     Substance and Sexual Activity   Drug Use Never     Tobacco Use History[3]    Family History[4]      Medications Prior to Admission:     lidocaine (LIDODERM) 5 %    metoprolol tartrate (LOPRESSOR) 25 mg tablet    midodrine (PROAMATINE) 2.5 mg tablet    potassium chloride (Klor-Con M20) 20 mEq tablet    amitriptyline (ELAVIL) 50 mg tablet    cyclobenzaprine (FLEXERIL) 10 mg tablet    dapsone 25 mg tablet    folic acid (FOLVITE) 1 mg tablet    lactulose (CHRONULAC) 10 g/15 mL solution    pantoprazole (PROTONIX) 40 mg tablet    polyethylene glycol (MIRALAX) 17 g packet    rifaximin  "(XIFAXAN) 550 mg tablet    spironolactone (ALDACTONE) 25 mg tablet    torsemide (DEMADEX) 20 mg tablet    ursodiol (ACTIGALL) 300 mg capsule    Current Facility-Administered Medications:     amitriptyline (ELAVIL) tablet 50 mg, HS    atorvastatin (LIPITOR) tablet 40 mg, QPM    lactulose (CHRONULAC) oral solution 30 g, 4x Daily    pantoprazole (PROTONIX) EC tablet 40 mg, Early Morning    polyethylene glycol (MIRALAX) packet 17 g, Daily    rifaximin (XIFAXAN) tablet 550 mg, Q12H MIGUEL    spironolactone (ALDACTONE) tablet 25 mg, Daily    torsemide (DEMADEX) tablet 20 mg, Daily  Allergies[5]    Physical Exam  Constitutional:       General: She is not in acute distress.     Appearance: Normal appearance. She is not ill-appearing.   HENT:      Head: Normocephalic and atraumatic.     Eyes:      General: Scleral icterus present.     Pulmonary:      Effort: Pulmonary effort is normal.   Abdominal:      General: Abdomen is flat. There is no distension.      Palpations: Abdomen is soft.      Tenderness: There is no abdominal tenderness.     Skin:     General: Skin is warm and dry.      Coloration: Skin is not jaundiced.     Neurological:      Mental Status: She is alert. She is disoriented.     Psychiatric:         Mood and Affect: Mood normal.         Behavior: Behavior normal.       Most Recent Vital Signs:  Vitals:    06/16/25 1030 06/16/25 1114 06/16/25 1133 06/16/25 1253   BP: 140/74  149/93    BP Location:       Pulse: 99 99 (!) 108    Resp:       Temp:       TempSrc:       SpO2: 98%  98%    Weight: 114 kg (251 lb)      Height: 5' 6\" (1.676 m)   5' 6\" (1.676 m)       Intake/Output Summary (Last 24 hours) at 6/16/2025 1333  Last data filed at 6/16/2025 1331  Gross per 24 hour   Intake 960 ml   Output 285 ml   Net 675 ml       LABS/IMAGING  Lab Results: I have reviewed all relevant lab results during this hospitalization.    Imaging Studies:  I have reviewed all the relevant images during this hospitalizations    Counseling " / Coordination of Care  Total time spent today 45 minutes. Greater than 50% of total time was spent with the patient and / or family counseling and / or coordination of care.    Rad Manzano PA-C         [1]   Past Medical History:  Diagnosis Date    Back pain     Carcinoid tumor     neuroendocrine    CHF (congestive heart failure) (HCC)     Cirrhosis of liver (HCC)     Hypertension     Sarcoidosis    [2]   Past Surgical History:  Procedure Laterality Date    CHOLECYSTECTOMY      GASTRIC BYPASS      HERNIA REPAIR      IR KYPHOPLASTY/VERTEBROPLASTY  10/15/2024   [3]   Social History  Tobacco Use   Smoking Status Never   Smokeless Tobacco Never   [4]   Family History  Problem Relation Name Age of Onset    Hypertension Father      Heart attack Father      No Known Problems Sister      No Known Problems Brother      No Known Problems Brother      No Known Problems Brother      No Known Problems Brother      No Known Problems Brother      No Known Problems Daughter     [5]   Allergies  Allergen Reactions    Zolpidem GI Intolerance, Other (See Comments) and Hives     Other Reaction(s): Other      Blacks out    Blacks out    Tapentadol Sneezing and Other (See Comments)     Zoned out    Alprazolam Other (See Comments)     Other Reaction(s): Other      Feels zoned out    Feels zoned out    Amoxicillin-Pot Clavulanate Hives and Nausea Only     Amoxil fine    Dust Mite Extract Sneezing    Morphine Headache, Nausea Only and Vomiting    Tramadol GI Intolerance    Azelastine Rash    Azelastine Hcl Rash    Molds & Smuts Rash     Other Reaction(s): Unknown

## 2025-06-16 NOTE — PLAN OF CARE
Problem: PAIN - ADULT  Goal: Verbalizes/displays adequate comfort level or baseline comfort level  Description: Interventions:  - Encourage patient to monitor pain and request assistance  - Assess pain using appropriate pain scale  - Administer analgesics as ordered based on type and severity of pain and evaluate response  - Implement non-pharmacological measures as appropriate and evaluate response  - Consider cultural and social influences on pain and pain management  - Notify physician/advanced practitioner if interventions unsuccessful or patient reports new pain  - Educate patient/family on pain management process including their role and importance of  reporting pain   - Provide non-pharmacologic/complimentary pain relief interventions  Outcome: Progressing     Problem: INFECTION - ADULT  Goal: Absence or prevention of progression during hospitalization  Description: INTERVENTIONS:  - Assess and monitor for signs and symptoms of infection  - Monitor lab/diagnostic results  - Monitor all insertion sites, i.e. indwelling lines, tubes, and drains  - Monitor endotracheal if appropriate and nasal secretions for changes in amount and color  - [place] appropriate cooling/warming therapies per order  - Administer medications as ordered  - Instruct and encourage patient and family to use good hand hygiene technique  - Identify and instruct in appropriate isolation precautions for identified infection/condition  Outcome: Progressing     Problem: SAFETY ADULT  Goal: Patient will remain free of falls  Description: INTERVENTIONS:  - Educate patient/family on patient safety including physical limitations  - Instruct patient to call for assistance with activity   - Consider consulting OT/PT to assist with strengthening/mobility based on AM PAC & JH-HLM score  - Consult OT/PT to assist with strengthening/mobility   - Keep Call bell within reach  - Keep bed low and locked with side rails adjusted as appropriate  - Keep  care items and personal belongings within reach  - Initiate and maintain comfort rounds  - Make Fall Risk Sign visible to staff  - Offer Toileting every 2 Hours, in advance of need  - Initiate/Maintain bed alarm    - Apply yellow socks and bracelet for high fall risk patients  - Consider moving patient to room near nurses station  Outcome: Progressing  Goal: Maintain or return to baseline ADL function  Description: INTERVENTIONS:  -  Assess patient's ability to carry out ADLs; assess patient's baseline for ADL function and identify physical deficits which impact ability to perform ADLs (bathing, care of mouth/teeth, toileting, grooming, dressing, etc.)  - Assess/evaluate cause of self-care deficits   - Assess range of motion  - Assess patient's mobility; develop plan if impaired  - Assess patient's need for assistive devices and provide as appropriate  - Encourage maximum independence but intervene and supervise when necessary  - Involve family in performance of ADLs  - Assess for home care needs following discharge   - Consider OT consult to assist with ADL evaluation and planning for discharge  - Provide patient education as appropriate  - Monitor functional capacity and physical performance, use of AM PAC & -HLM   - Monitor gait, balance and fatigue with ambulation    Outcome: Progressing  Goal: Maintains/Returns to pre admission functional level  Description: INTERVENTIONS:  - Perform AM-PAC 6 Click Basic Mobility/ Daily Activity assessment daily.  - Set and communicate daily mobility goal to care team and patient/family/caregiver.   - Collaborate with rehabilitation services on mobility goals if consulted  - Perform Range of Motion 3 times a day.  - Reposition patient every 2 hours.  - Dangle patient 2 times a day  - Out of bed for meals 2 times a day  - Out of bed for toileting  - Record patient progress and toleration of activity level   Outcome: Progressing     Problem: DISCHARGE PLANNING  Goal: Discharge  to home or other facility with appropriate resources  Description: INTERVENTIONS:  - Identify barriers to discharge w/patient and caregiver  - Arrange for needed discharge resources and transportation as appropriate  - Identify discharge learning needs (meds, wound care, etc.)  - Arrange for interpretive services to assist at discharge as needed  - Refer to Case Management Department for coordinating discharge planning if the patient needs post-hospital services based on physician/advanced practitioner order or complex needs related to functional status, cognitive ability, or social support system  Outcome: Progressing     Problem: Knowledge Deficit  Goal: Patient/family/caregiver demonstrates understanding of disease process, treatment plan, medications, and discharge instructions  Description: Complete learning assessment and assess knowledge base.  Interventions:  - Provide teaching at level of understanding  - Provide teaching via preferred learning methods  Outcome: Progressing     Problem: Nutrition/Hydration-ADULT  Goal: Nutrient/Hydration intake appropriate for improving, restoring or maintaining nutritional needs  Description: Monitor and assess patient's nutrition/hydration status for malnutrition. Collaborate with interdisciplinary team and initiate plan and interventions as ordered.  Monitor patient's weight and dietary intake as ordered or per policy. Utilize nutrition screening tool and intervene as necessary. Determine patient's food preferences and provide high-protein, high-caloric foods as appropriate.     INTERVENTIONS:  - Monitor oral intake, urinary output, labs, and treatment plans  - Assess nutrition and hydration status and recommend course of action  - Evaluate amount of meals eaten  - Assist patient with eating if necessary   - Allow adequate time for meals  - Recommend/ encourage appropriate diets, oral nutritional supplements, and vitamin/mineral supplements  - Order, calculate, and  assess calorie counts as needed  - Recommend, monitor, and adjust tube feedings and TPN/PPN based on assessed needs  - Assess need for intravenous fluids  - Provide specific nutrition/hydration education as appropriate  - Include patient/family/caregiver in decisions related to nutrition  Outcome: Progressing     Problem: Neurological Deficit  Goal: Neurological status is stable or improving  Description: Interventions:  - Monitor and assess patient's level of consciousness, motor function, sensory function, and level of assistance needed for ADLs.   - Monitor and report changes from baseline. Collaborate with interdisciplinary team to initiate plan and implement interventions as ordered.   - Provide and maintain a safe environment.  - Consider seizure precautions.  - Consider fall precautions.  - Consider aspiration precautions.  - Consider bleeding precautions.  Outcome: Progressing     Problem: Communication Impairment  Goal: Ability to express needs and understand communication  Description: Assess patient's communication skills and ability to understand information.  Patient will demonstrate use of effective communication techniques, alternative methods of communication and understanding even if not able to speak.     - Encourage communication and provide alternate methods of communication as needed.  - Collaborate with case management/ for discharge needs.  - Include patient/family/caregiver in decisions related to communication.  Outcome: Progressing     Problem: Nutrition  Goal: Nutrition/Hydration status is improving  Description: Monitor and assess patient's nutrition/hydration status for malnutrition (ex- brittle hair, bruises, dry skin, pale skin and conjunctiva, muscle wasting, smooth red tongue, and disorientation). Collaborate with interdisciplinary team and initiate plan and interventions as ordered.  Monitor patient's weight and dietary intake as ordered or per policy. Utilize  nutrition screening tool and intervene per policy. Determine patient's food preferences and provide high-protein, high-caloric foods as appropriate.     - Assist patient with eating.  - Allow adequate time for meals.  - Encourage patient to take dietary supplement as ordered.  - Collaborate with clinical nutritionist.  - Include patient/family/caregiver in decisions related to nutrition.  Outcome: Progressing

## 2025-06-16 NOTE — ASSESSMENT & PLAN NOTE
Presents to ED with confusion, generalized weakness -had been home for 1 day since discharge from SNF  Has history of [place] with recurrent issues with hepatic encephalopathy  Managed at home on lactulose 30 g 4 times daily and rifaximin     Ammonia 135 on admission-118 - 57 - 50  Aggressive lactulose dosing decreased to 30 g p.o. 4 times daily with additional MiraLAX  Intake and output with BM counts to assist with lactulose titration  Avoid any sedatives or hepatotoxic medications if at all possible  Reported vision loss on 6/15 -neurology contacted  Stat CT head without stroke  MRI brain wo contrast -diffusely increased cortical signal abnormality involving bilateral cerebral and cerebral hemispheres, basal ganglia, thalami and brainstem -worrisome for severe toxic/metabolic encephalopathy, due to history concerning for severe hepatic encephalopathy, additional ddx: Seizure related changes, possibly underlying infectious/inflammatory encephalitis  MRI brain with contrast -no pathologic enhancement  Continue neurochecks  Symptoms are greatly improved today patient near baseline  Pending PT/OT

## 2025-06-16 NOTE — ASSESSMENT & PLAN NOTE
Pt well known to our service. Hx of cirrhosis with HE. There is concern for noncompliance with her HE regimen at home. She apparently was doing well when in rehab but developed HE symptoms about a day after being discharged.   GI will continue to follow. No change to regimen for now.   Closely monitor BMs while admitted  Will check liver imaging which she is due for      Child Wilder class 9B  Ascites  Diuretics on torsemide and aldactone at home- held initially this admission  Low Na diet, 2g/day  Hepatic encephalopathy  Ammonia 118 on admission  Hx of ?non compliance with lactulose at home   Continue lactulose 30g QID and xifaxan 550mg BID  Esophageal varices  EGD 1/18/25 no EV   HCC screening  CT A/P no mass 12/2/24   Due for repeat imaging 6/2025- will order US and AFP  Patient's MELD Score is: 16    MELD Score 90-day mortality   <=9 1.9%   10-19 6.0%   20-29 19.6%   30-39 52.6%   >=40 71.3%     MELD Score Component Values:  Component Value Date   Creatinine: 0.88 mg/dL 6/16/2025   Dialysis at least twice   in the past week  Or CVVHD for >=24 hours   in the past week:     No    Bilirubin, total: 2.18 mg/dL 6/16/2025   INR: 1.78 6/14/2025   Sodium: 141 mmol/L 6/16/2025

## 2025-06-16 NOTE — OCCUPATIONAL THERAPY NOTE
"    Occupational Therapy Evaluation     Patient Name: Bailey Olsen  Today's Date: 6/16/2025  Problem List  Principal Problem:    Hepatic encephalopathy (HCC)  Active Problems:    Liver cirrhosis secondary to HYATT (nonalcoholic steatohepatitis) (HCC)    History of malignant carcinoid tumor of small intestine    Hypokalemia    Pancytopenia (HCC)    Vision loss    Past Medical History  Past Medical History[1]  Past Surgical History  Past Surgical History[2]        06/16/25 1116   OT Last Visit   OT Visit Date 06/16/25   Note Type   Note type Evaluation   Pain Assessment   Pain Assessment Tool 0-10   Pain Score 6   Pain Location/Orientation Location: Head  (\"Head and L side of body from when I fell.\")   Restrictions/Precautions   Weight Bearing Precautions Per Order No   Other Precautions Cognitive;Chair Alarm;Bed Alarm;Pain;Fall Risk   Home Living   Type of Home House  (1 [address] (reports this has been challenging and her  has been helping her get in and out of the home to prevent a fall))   Home Layout Two level;Performs ADLs on one level;Able to live on main level with bedroom/bathroom   Bathroom Shower/Tub Tub/shower unit   Bathroom Toilet Raised   Bathroom Equipment Grab bars in shower;Commode;Grab bars around toilet;Toilet raiser  (ordering tub transfer bench,  is planning to install handicap shower)   Bathroom Accessibility Accessible via walker   Home Equipment Walker;Cane;Wheelchair-manual;Electric scooter;Crutches  (electric scooter for use outside of the home)   Additional Comments Pt sleeps in regular bed   Prior Function   Level of [address] Independent with functional mobility;Needs assistance with ADLs;Needs assistance with IADLS   Lives With Spouse;Daughter   Receives Help From Family   IADLs Family/Friend/Other provides transportation;Independent with meal prep;Independent with medication management   Falls in the last 6 months   (\"2 for sure, I think it's been over 6 months since she " "broke her back\" difficult to remember, but potentially around 6 (was reported last admission))   Comments Mod (I) level surfaces with RW.  States she was doing great leaving the STR, but a \"switch flipped\" upon returning home, prompting return to ED.   General   Family/Caregiver Present Yes  (Pt's , sister, and brother-in-law present and engaged throughout evalaution)   ADL   Where Assessed Chair   UB Dressing Assistance 5  Supervision/Setup   LB Dressing Assistance 5  Supervision/Setup   LB Dressing Deficit   (pt able to don/doff socks with SPV while seated, would need assistance with pants and underwear)   Bed Mobility   Supine to Sit 4  Minimal assistance   Additional items Assist x 1;Increased time required;Verbal cues;HOB elevated   Transfers   Sit to Stand 4  Minimal assistance   Additional items Assist x 1;Increased time required;Verbal cues   Stand to Sit 4  Minimal assistance   Additional items Assist x 1;Increased time required;Verbal cues   Stand pivot 4  Minimal assistance   Additional items Assist x 1;Increased time required;Verbal cues   Additional Comments Pt completed short household distance mobility with Min A and ww.   Functional Mobility   Functional Mobility 4  Minimal assistance   Additional Comments Pt completed short household distance mobility with Min A and ww.   Balance   Static Sitting Good   Dynamic Sitting Fair +   Static Standing Fair   Dynamic Standing Fair -   Activity Tolerance   Activity Tolerance Patient limited by fatigue   Medical Staff Made Aware PT Otoniel   RUE Assessment   RUE Assessment X  (ROM WFL, strength grossly 3+/5)   LUE Assessment   LUE Assessment X  (ROM WFL, strength grossly 3+/5)   Hand Function   Gross Motor Coordination Impaired  (Inconsistent presentation from when formally assessed vs during functional activities.)   Fine Motor Coordination Impaired  (Inconsistent presentation from when formally assessed vs during functional activities.)   Hand Function " Comments R Hand Dominant   Psychosocial   Psychosocial (WDL) X   Patient Behaviors/Mood Anxious;Cooperative   Cognition   Overall Cognitive Status Impaired   Arousal/Participation Alert;Responsive;Cooperative   Attention Within functional limits   Orientation Level Oriented X4   Memory Decreased recall of recent events;Decreased short term memory   Following Commands Follows one step commands with increased time or repetition   Comments (S)  Pt with inconsistent cognitive impairments throughout session, including attention, problem solving, insight into deficits, safety awareness, and numeric calculation. However, pt's presentation could be more consistent with psychological etiology. Pt would benefit from psych eval.   Cognition Assessment Tools SLUMS  (completed during treatment session)   Assessment   Limitation Decreased ADL status;Decreased UE strength;Decreased Safe judgement during ADL;Decreased endurance;Decreased cognition;Decreased self-care trans;Decreased high-level ADLs;Mood limitation   Prognosis Fair   Plan   Treatment Interventions ADL retraining;Functional transfer training;UE strengthening/ROM;Endurance training;Cognitive reorientation;Patient/family training;Equipment evaluation/education;Compensatory technique education;Energy conservation;Activityengagement   Goal Expiration Date 06/30/25   OT Treatment Day 1   OT Frequency 2-3x/wk   Discharge Recommendation   Rehab Resource Intensity Level, OT II (Moderate Resource Intensity)   AM-PAC Daily Activity Inpatient   Lower Body Dressing 3   Bathing 2   Toileting 2   Upper Body Dressing 3   Grooming 3   Eating 4   Daily Activity Raw Score 17   Daily Activity Standardized Score (Calc for Raw Score >=11) 37.26   AM-PAC Applied Cognition Inpatient   Following a Speech/Presentation 2   Understanding Ordinary Conversation 3   Taking Medications 1   Remembering Where Things Are Placed or Put Away 2   Remembering List of 4-5 Errands 1   Taking Care of  "Complicated Tasks 1   Applied Cognition Raw Score 10   Applied Cognition Standardized Score 24.98   SLUMS   What day of the week is it? 1   What is the year? 1   What state are we in? 1   How much did you spend? 0  (\"I bought apples and a bike... (long time of distraction and trying to remember) $70\")   How much do you have left? 0  (\"Can we skip this one?\")   Name animals? 2  (12)   Oject recall? 0  (incorrect guess: comb)   Repeat numbers backwards? 1  (78, 946, 735)   Draw a clock? 0  (only ramirez dashes in random placement without drawing numbers. did not draw any hands)   Identify a triangle 1   Determine size? 1   What was the females name? 2   When did she go back to work? 2   What work did she do? 0  (\"she stopped working to take care of her family\")   What state did she live in? 0  ([place])   Calculated SLUMS Score 12   SLUMS Comments Pt participated in the [employer] [facility] ([facility] [place]) Examination on this date to further assess cognition. Pt scored a total of 12/30 which indicates dementia. Pt demonstrated deficits in the following categories: attention, orientation, immediate recall, delayed recall, numeric calculation and registration, visual spatial functioning, and executive functioning.   Additional Treatment Session   Start Time 1200   End Time 1214   Treatment Assessment SLUMS completed during treatment session. See above section for further details.   Additional Treatment Day 1   End of Consult   Education Provided Yes;Family or social support of family present for education by provider   Patient Position at End of Consult Bedside chair;Bed/Chair alarm activated;All needs within reach   Nurse Communication Nurse aware of consult     Assessment: Pt is 54 y.o., female, evaluated at [facility] 06/16/25 due to Hepatic encephalopathy (HCC). OT order placed to assess Pt's ADLs, cognitive status, and performance during functional tasks in order to maximize safety and independence while making most " appropriate d/c recommendations. An occupational profile and medical/therapy history were completed, including a extensive review of physical, cognitive, psychosocial history related to pt’s current functional performance. Pt with PMHx impacting their performance during ADL tasks include: hepatic encephalopathy, obesity, fibromyalgia, intractable back pain.  PT/OT skilled co-evaluation was completed considering low AM-PAC mobility score, medical complexity, multiple comorbidities, and cognitive impairments. Performance deficits/impairments identified at time of initial evaluation, that are impacting Pt's occupational performance and ability to safely return to [facility], include decreased UB/LB dressing, decreased toileting, decreased UB/LB bathing, decreased personal hygiene, decreased grooming , decreased medication management, decreased functional household distances, and decreased functional community distances/decreased strength, decreased endurance, decreased activity tolerance, impaired balance, pain, impaired cognition, impaired memory, decreased attention, difficulty problem solving, decreased safety awareness, impaired judgement, and obesity. Personal factors such as need for assistive device, inability to ambulate household distances, inability to ambulate community distances, need for assistance with ADLs, need for assistance with IADLs, frequent falls/fall history, limited insight to deficits, past and current experience, overall behavioral pattern, decreased attention, decreased initiation and engagement, and anxiety, and medical complications of change in mental status, abnormal H&H, abnormal CBC, need for input for mobility technique/safety, and body habitus are also affecting pt and may cause a barrier to discharge. In consideration of history obtained, identification of performance deficits, comorbidities that affect occupational performance, clinical presentation/decision-making, and modification of  tasks/assistance required to enable pt to complete evaluation components, this evaluation is determined to be of high complexity. Pt will benefit from continued skilled acute OT services to address barriers as defined above and to maximize level of independence/participation during ADLs and functional tasks to facilitate return toward PLOF and improved QoL. Pt would benefit from skilled OT intervention to address trial with LHAE, additional cognitive assessment, comprehensive ADL, therapeutic exercise, functional mobility, UE strengthening, balance during functional tasks, functional reach, FMC, GMC, endurance, and tolerance to upright position to maximize the potential of meeting client centered goals. From an OT standpoint, Level II (Moderate Resource Intensity) is recommended upon d/c.      The patient's raw score on the AM-PAC Daily Activity Inpatient Short Form is 17. A raw score of less than 19 suggests the patient may benefit from discharge to post-acute rehabilitation services. Please refer to the recommendation of the Occupational Therapist for safe discharge planning.    Pt goals to be met by 6/30/2025    Pt will demonstrate ability to complete grooming/hygiene tasks @ independent after set-up.  Pt will demonstrate ability to complete supine<>sit @ independent in order to increase safety and independence during ADL tasks.  Pt will demonstrate ability to complete UB ADLs including washing/dressing @ independent in order to increase performance and participation during meaningful tasks  Pt will demonstrate ability to complete LB dressing @ independent in order to increase safety and independence during meaningful tasks.   Pt will demonstrate ability to iain/doff socks/shoes while sitting @ independent in order to increase safety and independence during meaningful tasks.   Pt will demonstrate ability to complete toileting tasks including CM and pericare @ independent in order to increase safety and  independence during meaningful tasks.  Pt will demonstrate ability to complete EOB, chair, toilet/commode transfers @ modified independent in order to increase performance and participation during functional tasks.  Pt will demonstrate ability to stand for 5 minutes while maintaining good balance with use of a rolling walker for UB support PRN.  Pt will demonstrate ability to tolerate 30-35 minute OT session with no vc'ing for deep breathing or use of energy conservation techniques in order to increase activity tolerance during functional tasks.   Pt will demonstrate Good carryover of use of energy conservation/compensatory strategies during ADLs and functional tasks in order to increase safety and reduce risk for falls.   Pt will demonstrate Good attention and participation in continued evaluation of functional ambulation house hold distances in order to assist with safe d/c planning.  Pt will attend to continued cognitive assessments 100% of the time in order to provide most appropriate d/c recommendations.   Pt will follow 100% simple 2-step commands and be A&O x4 consistently with environmental cues to increase participation in functional activities.   Pt will identify 3 areas of interest/hobbies and 1 intervention on how to incorporate into daily life in order to increase interaction with environment and peers as well as increase participation in meaningful tasks.   Pt will demonstrate 100% carryover of BUE HEP in order to increase BUE MS and increase performance during functional tasks upon d/c home.    Cristian Ramachandran, ANOOPR/L           [1]   Past Medical History:  Diagnosis Date    Back pain     Carcinoid tumor     neuroendocrine    CHF (congestive heart failure) (HCC)     Cirrhosis of liver (HCC)     Hypertension     Sarcoidosis    [2]   Past Surgical History:  Procedure Laterality Date    CHOLECYSTECTOMY      GASTRIC BYPASS      HERNIA REPAIR      IR KYPHOPLASTY/VERTEBROPLASTY  10/15/2024

## 2025-06-16 NOTE — PROGRESS NOTES
Progress Note - Hospitalist   Name: Bailey Olsen 54 y.o. female I MRN: [id number]  Unit/Bed#: -01 I Date of Admission: 6/14/2025   Date of Service: 6/16/2025 I Hospital Day: 1    Assessment & Plan  Hepatic encephalopathy (HCC)  Vision loss  Presents to ED with confusion, generalized weakness -had been home for 1 day since discharge from SNF  Has history of Hyatt with recurrent issues with hepatic encephalopathy  Managed at home on lactulose 30 g 4 times daily and rifaximin     Ammonia 135 on admission-118 - 57 - 50  Aggressive lactulose dosing decreased to 30 g p.o. 4 times daily with additional MiraLAX  Intake and output with BM counts to assist with lactulose titration  Avoid any sedatives or hepatotoxic medications if at all possible  Reported vision loss on 6/15 -neurology contacted  Stat CT head without stroke  MRI brain wo contrast -diffusely increased cortical signal abnormality involving bilateral cerebral and cerebral hemispheres, basal ganglia, thalami and brainstem -worrisome for severe toxic/metabolic encephalopathy, due to history concerning for severe hepatic encephalopathy, additional ddx: Seizure related changes, possibly underlying infectious/inflammatory encephalitis  MRI brain with contrast -no pathologic enhancement  Continue neurochecks  Symptoms are greatly improved today patient near baseline  Pending PT/OT  Liver cirrhosis secondary to HYATT (nonalcoholic steatohepatitis) (HCC)  Known history of liver cirrhosis with recurrent admissions due to hepatic encephalopathy and constipation.  Initially torsemide and Aldactone on hold secondary to hepatic encephalopathy  With better oral intake today will resume  Hypokalemia  Potassium chronically low-maintain on 40 mill equivalents daily as an outpatient  Continue potassium supplementation today  Improved today    History of malignant carcinoid tumor of small intestine  No further intervention at this time  Pancytopenia (HCC)  Chronic  pancytopenia  Continue to trend labs  Currently no signs of bleeding    VTE Pharmacologic Prophylaxis:   Moderate Risk (Score 3-4) - Pharmacological DVT Prophylaxis Contraindicated. Sequential Compression Devices Ordered.    Mobility:   Basic Mobility Inpatient Raw Score: 18  JH-HLM Goal: 6: Walk 10 steps or more  JH-HLM Achieved: 6: Walk 10 steps or more  JH-HLM Goal NOT achieved. Continue with multidisciplinary rounding and encourage appropriate mobility to improve upon JH-HLM goals.    Patient Centered Rounds: I performed bedside rounds with nursing staff today.   Discussions with Specialists or Other Care Team Provider: CM, radiology    Education and Discussions with Family / Patient: Updated  () at bedside.    Current Length of Stay: 1 day(s)  Current Patient Status: Inpatient   Certification Statement: The patient will continue to require additional inpatient hospital stay due to encephalopathy  Discharge Plan: Anticipate discharge in 48-72 hrs to discharge location to be determined pending rehab evaluations.    Code Status: Level 1 - Full Code    Subjective   Seen and examined.  Patient is alert, making conversation.  Her speech is much improved, mild slurring and pausing but coherently forming sentences.  Her vision has improved as well and patient notes that she can see fairly normally today.    Objective :  Temp:  [97 °F (36.1 °C)-97.7 °F (36.5 °C)] 97.2 °F (36.2 °C)  HR:  [] 108  BP: (104-151)/(58-97) 149/93  Resp:  [17-20] 18  SpO2:  [92 %-99 %] 98 %  O2 Device: None (Room air)    Body mass index is 40.51 kg/m².     Input and Output Summary (last 24 hours):     Intake/Output Summary (Last 24 hours) at 6/16/2025 1314  Last data filed at 6/16/2025 1011  Gross per 24 hour   Intake 480 ml   Output 285 ml   Net 195 ml       Physical Exam  Vitals and nursing note reviewed.   Constitutional:       General: She is not in acute distress.     Appearance: Normal appearance. She is obese.  She is ill-appearing.   HENT:      Head: Normocephalic and atraumatic.      Nose: No congestion.      Mouth/Throat:      Mouth: Mucous membranes are moist.      Pharynx: Oropharynx is clear.     Eyes:      Conjunctiva/sclera: Conjunctivae normal.       Cardiovascular:      Rate and Rhythm: Normal rate and regular rhythm.      Pulses: Normal pulses.      Heart sounds: Normal heart sounds. No murmur heard.  Pulmonary:      Effort: Pulmonary effort is normal. No respiratory distress.      Breath sounds: Normal breath sounds.   Abdominal:      General: Bowel sounds are normal.      Palpations: Abdomen is soft.      Tenderness: There is no abdominal tenderness.     Musculoskeletal:         General: Normal range of motion.      Cervical back: Normal range of motion.      Right lower leg: No edema.      Left lower leg: No edema.     Skin:     General: Skin is warm and dry.      Findings: Bruising present.     Neurological:      Mental Status: She is alert.      Comments: Tatian greatly improving, speech is more coherent, no word salad.,  Vision has returned   Psychiatric:         Mood and Affect: Mood normal.         Behavior: Behavior normal.           Lines/Drains:              Lab Results: I have reviewed the following results:   Results from last 7 days   Lab Units 06/16/25  0530   WBC Thousand/uL 1.96*   HEMOGLOBIN g/dL 9.0*   HEMATOCRIT % 29.0*   PLATELETS Thousands/uL 60*     Results from last 7 days   Lab Units 06/16/25  0530   SODIUM mmol/L 141   POTASSIUM mmol/L 3.6   CHLORIDE mmol/L 107   CO2 mmol/L 28   BUN mg/dL 9   CREATININE mg/dL 0.88   ANION GAP mmol/L 6   CALCIUM mg/dL 8.5   ALBUMIN g/dL 2.8*   TOTAL BILIRUBIN mg/dL 2.18*   ALK PHOS U/L 218*   ALT U/L 19   AST U/L 49*   GLUCOSE RANDOM mg/dL 120     Results from last 7 days   Lab Units 06/14/25  1512   INR  1.78*     Results from last 7 days   Lab Units 06/14/25  1510   POC GLUCOSE mg/dl 105               Recent Cultures (last 7 days):         Imaging  Results Review: I reviewed radiology reports from this admission including: MRI brain.      Last 24 Hours Medication List:     Current Facility-Administered Medications:     amitriptyline (ELAVIL) tablet 50 mg, HS    atorvastatin (LIPITOR) tablet 40 mg, QPM    lactulose (CHRONULAC) oral solution 30 g, 4x Daily    pantoprazole (PROTONIX) EC tablet 40 mg, Early Morning    polyethylene glycol (MIRALAX) packet 17 g, Daily    rifaximin (XIFAXAN) tablet 550 mg, Q12H MIGUEL    spironolactone (ALDACTONE) tablet 25 mg, Daily    torsemide (DEMADEX) tablet 20 mg, Daily    Administrative Statements   Today, Patient Was Seen By: Laura Bell PA-C      **Please Note: This note may have been constructed using a voice recognition system.**

## 2025-06-16 NOTE — ASSESSMENT & PLAN NOTE
Pt never followed up outpatient despite numerous documented calls and letters being sent. I confirmed that her address and phone number are correct with her and her  today. Will need outpatient hepatology follow up.

## 2025-06-16 NOTE — PLAN OF CARE
Problem: PHYSICAL THERAPY ADULT  Goal: Performs mobility at highest level of function for planned discharge setting.  See evaluation for individualized goals.  Description: Treatment/Interventions: Functional transfer training, LE strengthening/ROM, Elevations, Therapeutic exercise, Endurance training, Patient/family training, Equipment eval/education, Bed mobility, Gait training, Compensatory technique education, OT          See flowsheet documentation for full assessment, interventions and recommendations.  Note: Prognosis: Guarded  Problem List: Decreased strength, Decreased endurance, Impaired balance, Decreased mobility, Obesity  Assessment: Pt is a 54 y.o. female who presented to [facility] on 6/14/2025 with Hepatic encephalopathy (HCC). Order placed for PT services. Pt seen for PT evaluation on 06/16/25. Refer to comprehensive exam completed above. Upon evaluation: Pt is presenting with functional deficits including decreased strength, decreased endurance, decreased activity tolerance, impaired balance, gait deviations, pain, difficulty problem solving, and obesity, requiring  min assistance for bed mobility and min assistance for transfers and ambulation with RW . Pt's clinical presentation is currently unstable/unpredictable given functional impairments listed above as well as medical complications of abnormal H&H, abnormal WBCs, abnormal CBC, multiple readmissions, need for input for mobility technique/safety, and polypharmacy. Pt's PMHx and comorbidities that may impact pt's participation and functional progress include:CHF, HTN, obesity, cancer history and/or treatment, fall(s) history, and liver cirrhosis . Personal factors affecting pt that may cause a barrier to progress and discharge include need for assistive device, stair(s) to enter home environment, limited availability of support from family/caregiver, inability to ambulate community distances, need for assistance with  Records sent. IADLs, frequent falls/fall history, near miss falls, health literacy, hx of noncompliance, anxiety, and depression. Pt's rehab potential is Guarded as indicated by barriers and findings above. Current Physical Therapy recommendation at time of discharge is Level II (Moderate Resource Intensity. Pt will benefit from continued skilled PT services to address deficits as defined above with focus on Functional transfer training, LE strengthening/ROM, Elevations, Therapeutic exercise, Endurance training, Patient/family training, Equipment eval/education, Bed mobility, Gait training, Compensatory technique education, OT to maximize level of functional mobility to facilitate return toward PLOF and improved QOL. Recommend trial with walker as appropriate.  Barriers to Discharge: Other (Comment)  Barriers to Discharge Comments: fall risk, home alone at least 10 hrs of the day, very high re-admission risk  Rehab Resource Intensity Level, PT: II (Moderate Resource Intensity)    See flowsheet documentation for full assessment.

## 2025-06-16 NOTE — PLAN OF CARE
Problem: PAIN - ADULT  Goal: Verbalizes/displays adequate comfort level or baseline comfort level  Description: Interventions:  - Encourage patient to monitor pain and request assistance  - Assess pain using appropriate pain scale  - Administer analgesics as ordered based on type and severity of pain and evaluate response  - Implement non-pharmacological measures as appropriate and evaluate response  - Consider cultural and social influences on pain and pain management  - Notify physician/advanced practitioner if interventions unsuccessful or patient reports new pain  - Educate patient/family on pain management process including their role and importance of  reporting pain   - Provide non-pharmacologic/complimentary pain relief interventions  Outcome: Progressing     Problem: INFECTION - ADULT  Goal: Absence or prevention of progression during hospitalization  Description: INTERVENTIONS:  - Assess and monitor for signs and symptoms of infection  - Monitor lab/diagnostic results  - Monitor all insertion sites, i.e. indwelling lines, tubes, and drains  - Monitor endotracheal if appropriate and nasal secretions for changes in amount and color  - [place] appropriate cooling/warming therapies per order  - Administer medications as ordered  - Instruct and encourage patient and family to use good hand hygiene technique  - Identify and instruct in appropriate isolation precautions for identified infection/condition  Outcome: Progressing  Goal: Absence of fever/infection during neutropenic period  Description: INTERVENTIONS:  - Monitor WBC  - Perform strict hand hygiene  - Limit to healthy visitors only  - No plants, dried, fresh or silk flowers with ortiz in patient room  Outcome: Progressing     Problem: SAFETY ADULT  Goal: Patient will remain free of falls  Description: INTERVENTIONS:  - Educate patient/family on patient safety including physical limitations  - Instruct patient to call for assistance with activity   -  Consider consulting OT/PT to assist with strengthening/mobility based on AM PAC & JH-HLM score  - Consult OT/PT to assist with strengthening/mobility   - Keep Call bell within reach  - Keep bed low and locked with side rails adjusted as appropriate  - Keep care items and personal belongings within reach  - Initiate and maintain comfort rounds  - Make Fall Risk Sign visible to staff  - Offer Toileting every 2 Hours, in advance of need  - Initiate/Maintain bed alarm    - Apply yellow socks and bracelet for high fall risk patients  - Consider moving patient to room near nurses station  Outcome: Progressing  Goal: Maintain or return to baseline ADL function  Description: INTERVENTIONS:  -  Assess patient's ability to carry out ADLs; assess patient's baseline for ADL function and identify physical deficits which impact ability to perform ADLs (bathing, care of mouth/teeth, toileting, grooming, dressing, etc.)  - Assess/evaluate cause of self-care deficits   - Assess range of motion  - Assess patient's mobility; develop plan if impaired  - Assess patient's need for assistive devices and provide as appropriate  - Encourage maximum independence but intervene and supervise when necessary  - Involve family in performance of ADLs  - Assess for home care needs following discharge   - Consider OT consult to assist with ADL evaluation and planning for discharge  - Provide patient education as appropriate  - Monitor functional capacity and physical performance, use of AM PAC & JH-HLM   - Monitor gait, balance and fatigue with ambulation    Outcome: Progressing  Goal: Maintains/Returns to pre admission functional level  Description: INTERVENTIONS:  - Perform AM-PAC 6 Click Basic Mobility/ Daily Activity assessment daily.  - Set and communicate daily mobility goal to care team and patient/family/caregiver.   - Collaborate with rehabilitation services on mobility goals if consulted  - Perform Range of Motion 3 times a day.  -  Reposition patient every 2 hours.  - Dangle patient 2 times a day  - Out of bed for meals 2 times a day  - Out of bed for toileting  - Record patient progress and toleration of activity level   Outcome: Progressing     Problem: DISCHARGE PLANNING  Goal: Discharge to home or other facility with appropriate resources  Description: INTERVENTIONS:  - Identify barriers to discharge w/patient and caregiver  - Arrange for needed discharge resources and transportation as appropriate  - Identify discharge learning needs (meds, wound care, etc.)  - Arrange for interpretive services to assist at discharge as needed  - Refer to Case Management Department for coordinating discharge planning if the patient needs post-hospital services based on physician/advanced practitioner order or complex needs related to functional status, cognitive ability, or social support system  Outcome: Progressing     Problem: Knowledge Deficit  Goal: Patient/family/caregiver demonstrates understanding of disease process, treatment plan, medications, and discharge instructions  Description: Complete learning assessment and assess knowledge base.  Interventions:  - Provide teaching at level of understanding  - Provide teaching via preferred learning methods  Outcome: Progressing     Problem: Nutrition/Hydration-ADULT  Goal: Nutrient/Hydration intake appropriate for improving, restoring or maintaining nutritional needs  Description: Monitor and assess patient's nutrition/hydration status for malnutrition. Collaborate with interdisciplinary team and initiate plan and interventions as ordered.  Monitor patient's weight and dietary intake as ordered or per policy. Utilize nutrition screening tool and intervene as necessary. Determine patient's food preferences and provide high-protein, high-caloric foods as appropriate.     INTERVENTIONS:  - Monitor oral intake, urinary output, labs, and treatment plans  - Assess nutrition and hydration status and recommend  course of action  - Evaluate amount of meals eaten  - Assist patient with eating if necessary   - Allow adequate time for meals  - Recommend/ encourage appropriate diets, oral nutritional supplements, and vitamin/mineral supplements  - Order, calculate, and assess calorie counts as needed  - Recommend, monitor, and adjust tube feedings and TPN/PPN based on assessed needs  - Assess need for intravenous fluids  - Provide specific nutrition/hydration education as appropriate  - Include patient/family/caregiver in decisions related to nutrition  Outcome: Progressing     Problem: Prexisting or High Potential for Compromised Skin Integrity  Goal: Skin integrity is maintained or improved  Description: INTERVENTIONS:  - Identify patients at risk for skin breakdown  - Assess and monitor skin integrity including under and around medical devices   - Assess and monitor nutrition and hydration status  - Monitor labs  - Assess for incontinence   - Turn and reposition patient  - Assist with mobility/ambulation  - Relieve pressure over mariano prominences   - Avoid friction and shearing  - Provide appropriate hygiene as needed including keeping skin clean and dry  - Evaluate need for skin moisturizer/barrier cream  - Collaborate with interdisciplinary team  - Patient/family teaching  - Consider wound care consult    Assess:  - Review Royce scale daily  - Clean and moisturize skin every shift  - Inspect skin when repositioning, toileting, and assisting with ADLS    - Assess extremities for adequate circulation and sensation     Bed Management:  - Have minimal linens on bed & keep smooth, unwrinkled  - Change linens as needed when moist or perspiring  - Avoid sitting or lying in one position for more than 2 hours while in bed?Keep HOB at 30 degrees   - Toileting:  - Offer bedside commode  - Assess for incontinence every 2  - Use incontinent care products after each incontinent episode such as barrier wipes    Activity:  - Mobilize  patient 3 times a day  - Encourage activity and walks on unit  - Encourage or provide ROM exercises   - Turn and reposition patient every 2 Hours  - Use appropriate equipment to lift or move patient in bed      Skin Care:  - Avoid use of baby powder, tape, friction and shearing, hot water or constrictive clothing  - Relieve pressure over bony prominences using pillows  - Do not massage red bony areas    Next Steps:    Outcome: Progressing     Problem: Neurological Deficit  Goal: Neurological status is stable or improving  Description: Interventions:  - Monitor and assess patient's level of consciousness, motor function, sensory function, and level of assistance needed for ADLs.   - Monitor and report changes from baseline. Collaborate with interdisciplinary team to initiate plan and implement interventions as ordered.   - Provide and maintain a safe environment.  - Consider seizure precautions.  - Consider fall precautions.  - Consider aspiration precautions.  - Consider bleeding precautions.  Outcome: Progressing     Problem: Communication Impairment  Goal: Ability to express needs and understand communication  Description: Assess patient's communication skills and ability to understand information.  Patient will demonstrate use of effective communication techniques, alternative methods of communication and understanding even if not able to speak.     - Encourage communication and provide alternate methods of communication as needed.  - Collaborate with case management/ for discharge needs.  - Include patient/family/caregiver in decisions related to communication.  Outcome: Progressing     Problem: Nutrition  Goal: Nutrition/Hydration status is improving  Description: Monitor and assess patient's nutrition/hydration status for malnutrition (ex- brittle hair, bruises, dry skin, pale skin and conjunctiva, muscle wasting, smooth red tongue, and disorientation). Collaborate with interdisciplinary team  and initiate plan and interventions as ordered.  Monitor patient's weight and dietary intake as ordered or per policy. Utilize nutrition screening tool and intervene per policy. Determine patient's food preferences and provide high-protein, high-caloric foods as appropriate.     - Assist patient with eating.  - Allow adequate time for meals.  - Encourage patient to take dietary supplement as ordered.  - Collaborate with clinical nutritionist.  - Include patient/family/caregiver in decisions related to nutrition.  Outcome: Progressing

## 2025-06-16 NOTE — PLAN OF CARE
Problem: OCCUPATIONAL THERAPY ADULT  Goal: Performs self-care activities at highest level of function for planned discharge setting.  See evaluation for individualized goals.  Description: Treatment Interventions: ADL retraining, Functional transfer training, UE strengthening/ROM, Endurance training, Cognitive reorientation, Patient/family training, Equipment evaluation/education, Compensatory technique education, Energy conservation, Activityengagement          See flowsheet documentation for full assessment, interventions and recommendations.   Note: Limitation: Decreased ADL status, Decreased UE strength, Decreased Safe judgement during ADL, Decreased endurance, Decreased cognition, Decreased self-care trans, Decreased high-level ADLs, Mood limitation  Prognosis: Fair  Assessment: Pt is 54 y.o., female, evaluated at [facility] 06/16/25 due to Hepatic encephalopathy (HCC). OT order placed to assess Pt's ADLs, cognitive status, and performance during functional tasks in order to maximize safety and independence while making most appropriate d/c recommendations. An occupational profile and medical/therapy history were completed, including a extensive review of physical, cognitive, psychosocial history related to pt’s current functional performance. Pt with PMHx impacting their performance during ADL tasks include: hepatic encephalopathy, obesity, fibromyalgia, intractable back pain.  PT/OT skilled co-evaluation was completed considering low AM-PAC mobility score, medical complexity, multiple comorbidities, and cognitive impairments. Performance deficits/impairments identified at time of initial evaluation, that are impacting Pt's occupational performance and ability to safely return to PLOF, include decreased UB/LB dressing, decreased toileting, decreased UB/LB bathing, decreased personal hygiene, decreased grooming , decreased medication management, decreased functional household distances, and decreased functional  community distances/decreased strength, decreased endurance, decreased activity tolerance, impaired balance, pain, impaired cognition, impaired memory, decreased attention, difficulty problem solving, decreased safety awareness, impaired judgement, and obesity. Personal factors such as need for assistive device, inability to ambulate household distances, inability to ambulate community distances, need for assistance with ADLs, need for assistance with IADLs, frequent falls/fall history, limited insight to deficits, past and current experience, overall behavioral pattern, decreased attention, decreased initiation and engagement, and anxiety, and medical complications of change in mental status, abnormal H&H, abnormal CBC, need for input for mobility technique/safety, and body habitus are also affecting pt and may cause a barrier to discharge. In consideration of history obtained, identification of performance deficits, comorbidities that affect occupational performance, clinical presentation/decision-making, and modification of tasks/assistance required to enable pt to complete evaluation components, this evaluation is determined to be of high complexity. Pt will benefit from continued skilled acute OT services to address barriers as defined above and to maximize level of independence/participation during ADLs and functional tasks to facilitate return toward PLOF and improved QoL. Pt would benefit from skilled OT intervention to address trial with LHAE, additional cognitive assessment, comprehensive ADL, therapeutic exercise, functional mobility, UE strengthening, balance during functional tasks, functional reach, FMC, GMC, endurance, and tolerance to upright position to maximize the potential of meeting client centered goals. From an OT standpoint, Level II (Moderate Resource Intensity) is recommended upon d/c.       Rehab Resource Intensity Level, OT: II (Moderate Resource Intensity)

## 2025-06-16 NOTE — ASSESSMENT & PLAN NOTE
Known history of liver cirrhosis with recurrent admissions due to hepatic encephalopathy and constipation.  Initially torsemide and Aldactone on hold secondary to hepatic encephalopathy  With better oral intake today will resume

## 2025-06-16 NOTE — PLAN OF CARE
Problem: OCCUPATIONAL THERAPY ADULT  Goal: Performs self-care activities at highest level of function for planned discharge setting.  See evaluation for individualized goals.  Description: Treatment Interventions: ADL retraining, Functional transfer training, UE strengthening/ROM, Endurance training, Cognitive reorientation, Patient/family training, Equipment evaluation/education, Compensatory technique education, Energy conservation, Activityengagement          See flowsheet documentation for full assessment, interventions and recommendations.   6/16/2025 1643 by Cristian Ramachandran, OT  Note: Limitation: Decreased ADL status, Decreased UE strength, Decreased Safe judgement during ADL, Decreased endurance, Decreased cognition, Decreased self-care trans, Decreased high-level ADLs, Mood limitation  Prognosis: Fair        Rehab Resource Intensity Level, OT: II (Moderate Resource Intensity)       6/16/2025 1643 by Cristian Ramachandran, OT  Note: Limitation: Decreased ADL status, Decreased UE strength, Decreased Safe judgement during ADL, Decreased endurance, Decreased cognition, Decreased self-care trans, Decreased high-level ADLs, Mood limitation  Prognosis: Fair        Rehab Resource Intensity Level, OT: II (Moderate Resource Intensity)

## 2025-06-16 NOTE — CASE MANAGEMENT
Case Management Assessment & Discharge Planning Note    Patient name Bailey Olsen  Location /-01 MRN [id number]  : 1970 Date 2025       Current Admission Date: 2025  Current Admission Diagnosis:Hepatic encephalopathy (HCC)   Patient Active Problem List    Diagnosis Date Noted    Vision loss 06/15/2025    Stage 3a chronic kidney disease (HCC) 2025    Scalp hematoma 2025    Contusion of back 2025    Ecchymosis 2025    Falls 2025    Chest pain 2025    Abdominal pain 2025    Acute kidney injury (HCC) 2025    Gram-negative bacteremia 2025    Acute respiratory failure (HCC) 2025    Carcinoid tumor of pancreas 2025    Hypomagnesemia 2025    Upper GI bleed 2025    Acute anemia 2025    Rectal bleeding 2025    Acute blood loss anemia 2025    Chronic, continuous use of opioids 2025    Acute metabolic encephalopathy 2025    SIRS (systemic inflammatory response syndrome) (HCC) 2025    Sacral fracture, closed (HCC) 11/15/2024    KISHORE (acute kidney injury) (HCC) 10/04/2024    Morbid obesity with BMI of 40.0-44.9, adult (HCC) 10/04/2024    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) 2024    Fall from standing 2024    Closed compression fracture of L1 lumbar vertebra, sequela 2024    Closed stable burst fracture of first lumbar vertebra with routine healing 2024    Groin hematoma 2024    Ambulatory dysfunction 2024    Intractable back pain 2024    Hyperammonemia (HCC) 2024    Hepatic encephalopathy (HCC) 2024    Fall 2024    Liver cirrhosis secondary to HYATT (nonalcoholic steatohepatitis) (HCC) 2024    History of malignant carcinoid tumor of small intestine 2024    Class 3 severe obesity due to excess calories in adult 2024    Primary hypertension 2024    Hypokalemia 2024     Fibromyalgia 05/26/2024    Iron deficiency anemia 05/26/2024    Pancytopenia (HCC) 05/26/2024    Sarcoidosis 05/26/2024    Bilateral pulmonary infiltrates 05/26/2024      LOS (days): 1  Geometric Mean LOS (GMLOS) (days):   Days to GMLOS:     OBJECTIVE:  PATIENT READMITTED TO HOSPITAL            Current admission status: Inpatient  Referral Reason: Placement within 24-48 hours, VNA    Preferred Pharmacy:   [facility] [address] [place] [address] - [address]  [address] [facility] [address]  Phone: [phone] Fax: [phone]    [id number] - [place], [place] - [address]  [address] [address] [address]  Phone: [phone] Fax: [phone]    Primary Care Provider: Nayely Brown,     Primary Insurance: MEDICARE  Secondary Insurance: BLUE CROSS    ASSESSMENT:  Active Health Care Proxies       Cristian Olsen Health Care Representative - Spouse   Primary Phone: [phone] (Mobile)                 Advance Directives  Does patient have a Health Care POA?: No  Was patient offered paperwork?: Yes  Does patient currently have a Health Care decision maker?: Yes, please see Health Care Proxy section  Does patient have Advance Directives?: No  Was patient offered paperwork?: Yes  Primary Contact: Spouse - Cristian         Readmission Root Cause  30 Day Readmission: Yes  During your hospital stay, did someone (provider, nurse, ) explain your care to you in a way you could understand?: Yes  Did you feel medically stable to leave the hospital?: Yes  Were you able to pay for your medication at the pharmacy?: Yes  Did you have reliable transportation to take you to your appointments?: Yes  During previous admission, was a post-acute recommendation made?: Yes  What post-acute resources were offered?: STR  Patient was readmitted due to: Hepatic encephalopathy  Action Plan: therapy evals    Patient Information  Admitted from:: Home  Mental Status:  Alert  During Assessment patient was accompanied by: Spouse  Assessment information provided by:: Patient, Spouse  Primary Caregiver: Self  Support Systems: Daughter, Spouse/significant other, Family members  County of Residence: [facility]  What city do you live in?: [place]  Home entry access options. Select all that apply.: Stairs  Number of steps to enter home.: 1  Do the steps have railings?: No  Type of Current Residence: [address] [place] [address] home  Upon entering residence, is there a bedroom on the main floor (no further steps)?: Yes  Upon entering residence, is there a bathroom on the main floor (no further steps)?: Yes  Living Arrangements: Lives w/ Spouse/significant other, Lives w/ Daughter  Is patient a ?: No    Activities of Daily Living Prior to Admission  Functional Status: Independent  Completes ADLs independently?: Yes  Ambulates independently?: Yes  Does patient use assisted devices?: Yes  Assisted Devices (DME) used: Walker  Does patient currently own DME?: Yes  What DME does the patient currently own?: Wheelchair, Walker, Straight Cane, Bedside Commode, Crutches, Other (Comment) (scooter)  Does patient have a history of Outpatient Therapy (PT/OT)?: No  Does the patient have a history of Short-Term Rehab?: Yes ([facility])  Does patient have a history of HHC?: Yes ([facility], [facility], [facility])  Does patient currently have HHC?: Yes ([facility])    Current Home Health Care  Type of Current Home Care Services: Home PT, Home OT  Home Health Agency Name:: [facility]  Current Home Health Follow-Up Provider:: PCP    Patient Information Continued  Income Source: SSI/SSD  Does patient have prescription coverage?: Yes  Can the patient afford their medications and any related supplies (such as glucometers or test strips)?: Yes  Does patient receive dialysis treatments?: No  Does patient have a history of substance abuse?: No  Does patient have a history of Mental Health Diagnosis?:  No         Means of Transportation  Means of Transport to Appts:: Family transport          DISCHARGE DETAILS:    Discharge planning discussed with:: patient, spouse  Freedom of Choice: Yes  Comments - Freedom of Choice: aware of pending therapy assessments/open to referrals  CM contacted family/caregiver?: Yes  Were Treatment Team discharge recommendations reviewed with patient/caregiver?: Yes  Did patient/caregiver verbalize understanding of patient care needs?: Yes  Were patient/caregiver advised of the risks associated with not following Treatment Team discharge recommendations?: Yes    Contacts  Patient Contacts: Cristian Olsen- spouse  Relationship to Patient:: Family  Contact Method: In Person  Reason/Outcome: Continuity of Care, Discharge Planning    Requested Home Health Care         Home Health Agency Name:: [facility]       Treatment Team Recommendation: Other (pending therapy assessments)        Transport at Discharge : Family       CM met with patient at the bedside,baseline information  was obtained. CM discussed the role of CM in helping the patient develop a discharge plan and assist the patient in carry out their plan.  Patient lives with her spouse and 19 year old daughter in a 2 story home, 1st floor set up and 1 [address]. Patient typically ambulates with a walker.   Patient was DC from [place] on Friday - patient did well Friday and into Saturday but had a quick decompensation and struggled with her mobility.   CM discussed DC planning and pending therapy assessments - they were open to blanket referrals for STR if recommended.  Regarding [facility] - they were setup with [facility] upon leaving [place] - it was pending insurance verification as patient's primary is Medicare now.     CM reached out to insurance verification to confirm Medicare insurance and update patient's chart.     CM made aware of level 2 therapy recommendation.   CM placed blanket STR and C referral for [facility].       Case was  discussed in multi disciplinary rounds. Attending the rounds were the provider, Nursing, Care Management, and therapy (OT).   Plan is:  -MRI brain wo contrast -diffusely increased cortical signal abnormality involving bilateral cerebral and cerebral hemispheres, basal ganglia, thalami and brainstem -worrisome for severe toxic/metabolic encephalopathy, due to history concerning for severe hepatic encephalopathy, additional ddx: Seizure related changes, possibly underlying infectious/inflammatory encephalitis  -MRI brain with contrast -no pathologic enhancement MRI negative, GI consult  DC recommendation is Level 2.     CM to follow patient's care and discharge needs.

## 2025-06-16 NOTE — PLAN OF CARE
Problem: PAIN - ADULT  Goal: Verbalizes/displays adequate comfort level or baseline comfort level  Description: Interventions:  - Encourage patient to monitor pain and request assistance  - Assess pain using appropriate pain scale  - Administer analgesics as ordered based on type and severity of pain and evaluate response  - Implement non-pharmacological measures as appropriate and evaluate response  - Consider cultural and social influences on pain and pain management  - Notify physician/advanced practitioner if interventions unsuccessful or patient reports new pain  - Educate patient/family on pain management process including their role and importance of  reporting pain   - Provide non-pharmacologic/complimentary pain relief interventions  Outcome: Progressing     Problem: INFECTION - ADULT  Goal: Absence or prevention of progression during hospitalization  Description: INTERVENTIONS:  - Assess and monitor for signs and symptoms of infection  - Monitor lab/diagnostic results  - Monitor all insertion sites, i.e. indwelling lines, tubes, and drains  - Monitor endotracheal if appropriate and nasal secretions for changes in amount and color  - [place] appropriate cooling/warming therapies per order  - Administer medications as ordered  - Instruct and encourage patient and family to use good hand hygiene technique  - Identify and instruct in appropriate isolation precautions for identified infection/condition  Outcome: Progressing  Goal: Absence of fever/infection during neutropenic period  Description: INTERVENTIONS:  - Monitor WBC  - Perform strict hand hygiene  - Limit to healthy visitors only  - No plants, dried, fresh or silk flowers with ortiz in patient room  Outcome: Progressing     Problem: SAFETY ADULT  Goal: Patient will remain free of falls  Description: INTERVENTIONS:  - Educate patient/family on patient safety including physical limitations  - Instruct patient to call for assistance with activity   -  Consider consulting OT/PT to assist with strengthening/mobility based on AM PAC & JH-HLM score  - Consult OT/PT to assist with strengthening/mobility   - Keep Call bell within reach  - Keep bed low and locked with side rails adjusted as appropriate  - Keep care items and personal belongings within reach  - Initiate and maintain comfort rounds  - Make Fall Risk Sign visible to staff  - Offer Toileting every 2 Hours, in advance of need  - Initiate/Maintain bed alarm    - Apply yellow socks and bracelet for high fall risk patients  - Consider moving patient to room near nurses station  Outcome: Progressing  Goal: Maintain or return to baseline ADL function  Description: INTERVENTIONS:  -  Assess patient's ability to carry out ADLs; assess patient's baseline for ADL function and identify physical deficits which impact ability to perform ADLs (bathing, care of mouth/teeth, toileting, grooming, dressing, etc.)  - Assess/evaluate cause of self-care deficits   - Assess range of motion  - Assess patient's mobility; develop plan if impaired  - Assess patient's need for assistive devices and provide as appropriate  - Encourage maximum independence but intervene and supervise when necessary  - Involve family in performance of ADLs  - Assess for home care needs following discharge   - Consider OT consult to assist with ADL evaluation and planning for discharge  - Provide patient education as appropriate  - Monitor functional capacity and physical performance, use of AM PAC & JH-HLM   - Monitor gait, balance and fatigue with ambulation    Outcome: Progressing  Goal: Maintains/Returns to pre admission functional level  Description: INTERVENTIONS:  - Perform AM-PAC 6 Click Basic Mobility/ Daily Activity assessment daily.  - Set and communicate daily mobility goal to care team and patient/family/caregiver.   - Collaborate with rehabilitation services on mobility goals if consulted  - Perform Range of Motion 3 times a day.  -  Reposition patient every 2 hours.  - Dangle patient 2 times a day  - Out of bed for meals 2 times a day  - Out of bed for toileting  - Record patient progress and toleration of activity level   Outcome: Progressing     Problem: DISCHARGE PLANNING  Goal: Discharge to home or other facility with appropriate resources  Description: INTERVENTIONS:  - Identify barriers to discharge w/patient and caregiver  - Arrange for needed discharge resources and transportation as appropriate  - Identify discharge learning needs (meds, wound care, etc.)  - Arrange for interpretive services to assist at discharge as needed  - Refer to Case Management Department for coordinating discharge planning if the patient needs post-hospital services based on physician/advanced practitioner order or complex needs related to functional status, cognitive ability, or social support system  Outcome: Progressing     Problem: Knowledge Deficit  Goal: Patient/family/caregiver demonstrates understanding of disease process, treatment plan, medications, and discharge instructions  Description: Complete learning assessment and assess knowledge base.  Interventions:  - Provide teaching at level of understanding  - Provide teaching via preferred learning methods  Outcome: Progressing     Problem: Nutrition/Hydration-ADULT  Goal: Nutrient/Hydration intake appropriate for improving, restoring or maintaining nutritional needs  Description: Monitor and assess patient's nutrition/hydration status for malnutrition. Collaborate with interdisciplinary team and initiate plan and interventions as ordered.  Monitor patient's weight and dietary intake as ordered or per policy. Utilize nutrition screening tool and intervene as necessary. Determine patient's food preferences and provide high-protein, high-caloric foods as appropriate.     INTERVENTIONS:  - Monitor oral intake, urinary output, labs, and treatment plans  - Assess nutrition and hydration status and recommend  course of action  - Evaluate amount of meals eaten  - Assist patient with eating if necessary   - Allow adequate time for meals  - Recommend/ encourage appropriate diets, oral nutritional supplements, and vitamin/mineral supplements  - Order, calculate, and assess calorie counts as needed  - Recommend, monitor, and adjust tube feedings and TPN/PPN based on assessed needs  - Assess need for intravenous fluids  - Provide specific nutrition/hydration education as appropriate  - Include patient/family/caregiver in decisions related to nutrition  Outcome: Progressing     Problem: Prexisting or High Potential for Compromised Skin Integrity  Goal: Skin integrity is maintained or improved  Description: INTERVENTIONS:  - Identify patients at risk for skin breakdown  - Assess and monitor skin integrity including under and around medical devices   - Assess and monitor nutrition and hydration status  - Monitor labs  - Assess for incontinence   - Turn and reposition patient  - Assist with mobility/ambulation  - Relieve pressure over mariano prominences   - Avoid friction and shearing  - Provide appropriate hygiene as needed including keeping skin clean and dry  - Evaluate need for skin moisturizer/barrier cream  - Collaborate with interdisciplinary team  - Patient/family teaching  - Consider wound care consult    Assess:  - Review Royce scale daily  - Clean and moisturize skin every shift  - Inspect skin when repositioning, toileting, and assisting with ADLS    - Assess extremities for adequate circulation and sensation     Bed Management:  - Have minimal linens on bed & keep smooth, unwrinkled  - Change linens as needed when moist or perspiring  - Avoid sitting or lying in one position for more than 2 hours while in bed?Keep HOB at 30 degrees   - Toileting:  - Offer bedside commode  - Assess for incontinence every 2  - Use incontinent care products after each incontinent episode such as barrier wipes    Activity:  - Mobilize  patient 3 times a day  - Encourage activity and walks on unit  - Encourage or provide ROM exercises   - Turn and reposition patient every 2 Hours  - Use appropriate equipment to lift or move patient in bed      Skin Care:  - Avoid use of baby powder, tape, friction and shearing, hot water or constrictive clothing  - Relieve pressure over bony prominences using pillows  - Do not massage red bony areas    Next Steps:    Outcome: Progressing     Problem: Neurological Deficit  Goal: Neurological status is stable or improving  Description: Interventions:  - Monitor and assess patient's level of consciousness, motor function, sensory function, and level of assistance needed for ADLs.   - Monitor and report changes from baseline. Collaborate with interdisciplinary team to initiate plan and implement interventions as ordered.   - Provide and maintain a safe environment.  - Consider seizure precautions.  - Consider fall precautions.  - Consider aspiration precautions.  - Consider bleeding precautions.  Outcome: Progressing     Problem: Communication Impairment  Goal: Ability to express needs and understand communication  Description: Assess patient's communication skills and ability to understand information.  Patient will demonstrate use of effective communication techniques, alternative methods of communication and understanding even if not able to speak.     - Encourage communication and provide alternate methods of communication as needed.  - Collaborate with case management/ for discharge needs.  - Include patient/family/caregiver in decisions related to communication.  Outcome: Progressing     Problem: Nutrition  Goal: Nutrition/Hydration status is improving  Description: Monitor and assess patient's nutrition/hydration status for malnutrition (ex- brittle hair, bruises, dry skin, pale skin and conjunctiva, muscle wasting, smooth red tongue, and disorientation). Collaborate with interdisciplinary team  and initiate plan and interventions as ordered.  Monitor patient's weight and dietary intake as ordered or per policy. Utilize nutrition screening tool and intervene per policy. Determine patient's food preferences and provide high-protein, high-caloric foods as appropriate.     - Assist patient with eating.  - Allow adequate time for meals.  - Encourage patient to take dietary supplement as ordered.  - Collaborate with clinical nutritionist.  - Include patient/family/caregiver in decisions related to nutrition.  Outcome: Progressing

## 2025-06-16 NOTE — SPEECH THERAPY NOTE
"Speech/Language Evaluation    Patient Name: Bailey Olsen    Today's Date: roblem List  Principal Problem:    Hepatic encephalopathy (HCC)  Active Problems:    Liver cirrhosis secondary to HYATT (nonalcoholic steatohepatitis) (HCC)    History of malignant carcinoid tumor of small intestine    Hypokalemia    Pancytopenia (HCC)    Vision loss      Past Medical History  Past Medical History[1]    Past Surgical History  Past Surgical History[2]      Summary:  Patient received sitting reclined in bed at my arrival with  present.  Patient admitted with speech changes including dysfluency, dysarthria, and expressive aphasia.  Both patient and  report that throughout multiple hospital admissions pt's symptoms have largely gone unchanged in each presentation, she denies having experienced speech changes in prior admissions.  She also reports temporary visual changes were also new, though these resolved to baseline when she put in her contact lenses.      Initially into language and fluency evaluations patient demonstrating perseveration and paraphasias, selecting words similar to but not accurately the word she had intended to use.  Patient with perseveration to multiple questions about her speech, \"I can see now.  I can see.\"  When asked about examples of her speech changes pt again stating, \"I'm not sure but I can see.\"  Expressive aphasia resolves for a large portion of the remaining testing, pt does not perseverate again and despite paraphasias occurring x7 in first portion of interview pt demonstrating 1 paraphasia across the next 15 minutes (I.e. listing hot dog for fruit).  Patient able to speak fluently when explaining previous rehab stays including bingo and elmira activities, pt describing events in detail, prizes, type of bingo card, and other rehab residents she sat next to for these activities.  Patient speaking for >3 minutes without interruption by clinician or , pt fluent throughout, does " "not demonstrate dysfluency including part word repetitions, prolongations, or blocking.  While speech appeared effortful for patient immediately prior to this story it was not demonstrated across those 3 minutes.  Upon ending her story pt returning to dysfluency and effortful speech.  Patient demonstrating a very abrupt \"respiratory change\" arching her back up off of the bed and staring at the ceiling, seemingly gasping for air.  Clinician asking if the patient was in pain, if something was wrong.  Patient continued to stare at the ceiling, exhaling very slowly stating \"no that just happens.\"  Patient presented with this and similar events throughout evaluation.  Respiratory patterns followed a breath hold and gasp pattern, varied in intensity, never acknowledged by patient or spouse.  Patient's overall rate and resp/phonation pattern very poor, gasping for air, speaking beyond reserve lung volume, strained/strangled voice, loud exhale.    Patient reporting that her words feel \"jumbled\" and describes them as \"slushy.\"  Patient becomes dysarthric upon this report, remains mildly dysarthric for the remainder of this topic, but resolves as testing continued.  Patient is able to ID all objects in her room, appeared unsure of pen object (I.e. \"That might be a pen or it might be something else.\")  Patient able to complete fill in the blank at the opposites, phrase, and sentences levels demonstrating no word finding difficulty.  Patient following 1-2 step directions @ 100% acc with no observed processing change.  A second larger gasping/arching/staring episode occurs following object naming, pt darting her eyes to ceiling corner, speaking to clinician with her eyes fixed in the opposite direction of clinician.  Change in eye gaze is not acknowledged by patient, , or clinician, resolves as testing resumed.  During fluency naming tasks patient asked to name as many fruits and vegetables as she was able with no time " "frame.  Patient able to name 10 items in one minute.  Patient giving long pauses every 2-3 words, when asked to provide one more patient repeating the identical list from the last minute in the same order.  Patient including \"hot dogs... now I know that's not a meal but hot dogs.\"  When asked about including hot dogs pt stating, \"I just thought of it quick.\"  Reading comprehension at word, sentence, and short paragraph level all deemed WFL.  During education of dysphagia and language changes patient raising her hand, education paused, clinician \"did you have a question about that?\"  Pt responding, \"I was pretty good can I have an Italian ice?\"  Education on dysphagia continues, at education end patient taking a large sip of water with very forcible dry cough, appeared non-dysphagia related.    Recommendation:  Patient does not demonstrate consistent clinical pattern of ongoing aphasia or dysfluency.  Therapy Prognosis:  Guarded    Patient's goal:  \"I just think its all from the fall.\"      Current Medical:  54-year-old female presenting for evaluation of lower extremity weakness. Vital signs are stable, patient appears confused but breathing comfortably in room. Patient has a complex medical history, suspect potential hyperammonemia versus acute CVA. Per sister last known normal approximately 8 AM this morning. Stroke alert called on arrival, patient currently pending labs and CT for further evaluation.       General Cognitive Status:  Alert with decreased attention    Auditory Comprehension - WFL for:  One step commands  Two step commands  Personal y/n ?'s  Simple y/n ?'s  Paragraph Level Comprehension  Comprehension of Conversation    Reading Comprehension - WFL for:  Sentence comprehension  Functional comprehension  Paragraph comprehension    Speech Mechanism Examination:   Facial: symmetrical  Labial: WFL  Lingual: WFL  Velum: symmetrical  Mandible: adequate ROM  Dentition: adequate  Vocal quality:clear/adequate "     Oral Expression - WFL for:  Automatic Social Utterances  Word Repetition  Phrase Completion  Confrontation Naming  Responsive Naming  Divergent Naming  Conversation  Able to make basic needs known     Motor Speech:  Dysarthria very inconsistent, concentrated to areas surrounding conversation regarding previous dysarthria but resolves as testing continues.  Patient's overall rate and resp/phonation pattern very poor, gasping for air, speaking beyond reserve lung volume, strained/strangled voice, loud exhale.     Orientation - WFL    Also noted:  Distractable  Inattention  Gaze preference to the upper left and upper right most corners of her eyes during conversation  Semantic paraphasias x7 in first few minutes of interview, only once afterward  Perseveration x1  Tangential  x1, fluent  Aware of deficits, can speak to them in detail, but does not acknowledge them as they occur    Results discussed with:  Patient, MD AQUILES       [1]   Past Medical History:  Diagnosis Date    Back pain     Carcinoid tumor     neuroendocrine    CHF (congestive heart failure) (HCC)     Cirrhosis of liver (HCC)     Hypertension     Sarcoidosis    [2]   Past Surgical History:  Procedure Laterality Date    CHOLECYSTECTOMY      GASTRIC BYPASS      HERNIA REPAIR      IR KYPHOPLASTY/VERTEBROPLASTY  10/15/2024

## 2025-06-16 NOTE — ASSESSMENT & PLAN NOTE
Potassium chronically low-maintain on 40 mill equivalents daily as an outpatient  Continue potassium supplementation today  Improved today

## 2025-06-17 VITALS
DIASTOLIC BLOOD PRESSURE: 67 MMHG | HEIGHT: 66 IN | WEIGHT: 251 LBS | BODY MASS INDEX: 40.34 KG/M2 | TEMPERATURE: 97.2 F | SYSTOLIC BLOOD PRESSURE: 137 MMHG | HEART RATE: 103 BPM | RESPIRATION RATE: 18 BRPM | OXYGEN SATURATION: 99 %

## 2025-06-17 PROBLEM — K76.82 HEPATIC ENCEPHALOPATHY (HCC): Status: RESOLVED | Noted: 2024-06-11 | Resolved: 2025-06-17

## 2025-06-17 PROBLEM — E72.20 HYPERAMMONEMIA (HCC): Status: RESOLVED | Noted: 2024-07-05 | Resolved: 2025-06-17

## 2025-06-17 PROBLEM — H54.7 VISION LOSS: Status: RESOLVED | Noted: 2025-06-15 | Resolved: 2025-06-17

## 2025-06-17 LAB
ALBUMIN SERPL BCG-MCNC: 2.7 G/DL (ref 3.5–5)
ALP SERPL-CCNC: 234 U/L (ref 34–104)
ALT SERPL W P-5'-P-CCNC: 23 U/L (ref 7–52)
AMMONIA PLAS-SCNC: 37 UMOL/L (ref 18–72)
ANION GAP SERPL CALCULATED.3IONS-SCNC: 6 MMOL/L (ref 4–13)
AST SERPL W P-5'-P-CCNC: 50 U/L (ref 13–39)
BILIRUB SERPL-MCNC: 1.77 MG/DL (ref 0.2–1)
BUN SERPL-MCNC: 9 MG/DL (ref 5–25)
CALCIUM ALBUM COR SERPL-MCNC: 9 MG/DL (ref 8.3–10.1)
CALCIUM SERPL-MCNC: 8 MG/DL (ref 8.4–10.2)
CHLORIDE SERPL-SCNC: 102 MMOL/L (ref 96–108)
CO2 SERPL-SCNC: 28 MMOL/L (ref 21–32)
CREAT SERPL-MCNC: 0.96 MG/DL (ref 0.6–1.3)
ERYTHROCYTE [DISTWIDTH] IN BLOOD BY AUTOMATED COUNT: 18.3 % (ref 11.6–15.1)
GFR SERPL CREATININE-BSD FRML MDRD: 66 ML/MIN/1.73SQ M
GLUCOSE SERPL-MCNC: 179 MG/DL (ref 65–140)
HCT VFR BLD AUTO: 28.1 % (ref 34.8–46.1)
HGB BLD-MCNC: 8.8 G/DL (ref 11.5–15.4)
MCH RBC QN AUTO: 29.7 PG (ref 26.8–34.3)
MCHC RBC AUTO-ENTMCNC: 31.3 G/DL (ref 31.4–37.4)
MCV RBC AUTO: 95 FL (ref 82–98)
PLATELET # BLD AUTO: 55 THOUSANDS/UL (ref 149–390)
PMV BLD AUTO: 10.7 FL (ref 8.9–12.7)
POTASSIUM SERPL-SCNC: 3.2 MMOL/L (ref 3.5–5.3)
PROT SERPL-MCNC: 6.6 G/DL (ref 6.4–8.4)
RBC # BLD AUTO: 2.96 MILLION/UL (ref 3.81–5.12)
SODIUM SERPL-SCNC: 136 MMOL/L (ref 135–147)
WBC # BLD AUTO: 2.43 THOUSAND/UL (ref 4.31–10.16)

## 2025-06-17 PROCEDURE — 97530 THERAPEUTIC ACTIVITIES: CPT

## 2025-06-17 PROCEDURE — 82140 ASSAY OF AMMONIA: CPT

## 2025-06-17 PROCEDURE — 99239 HOSP IP/OBS DSCHRG MGMT >30: CPT

## 2025-06-17 PROCEDURE — 85027 COMPLETE CBC AUTOMATED: CPT

## 2025-06-17 PROCEDURE — 80053 COMPREHEN METABOLIC PANEL: CPT

## 2025-06-17 PROCEDURE — 97535 SELF CARE MNGMENT TRAINING: CPT

## 2025-06-17 PROCEDURE — 97116 GAIT TRAINING THERAPY: CPT

## 2025-06-17 RX ORDER — POTASSIUM CHLORIDE 1500 MG/1
40 TABLET, EXTENDED RELEASE ORAL ONCE
Status: COMPLETED | OUTPATIENT
Start: 2025-06-17 | End: 2025-06-17

## 2025-06-17 RX ORDER — LACTULOSE 10 G/15ML
40 SOLUTION ORAL 4 TIMES DAILY
Status: DISCONTINUED | OUTPATIENT
Start: 2025-06-17 | End: 2025-06-17 | Stop reason: HOSPADM

## 2025-06-17 RX ORDER — POLYETHYLENE GLYCOL 3350 17 G/17G
17 POWDER, FOR SOLUTION ORAL DAILY
Qty: 510 G | Refills: 0 | Status: SHIPPED | OUTPATIENT
Start: 2025-06-17

## 2025-06-17 RX ORDER — METOPROLOL TARTRATE 25 MG/1
25 TABLET, FILM COATED ORAL EVERY 12 HOURS SCHEDULED
Status: DISCONTINUED | OUTPATIENT
Start: 2025-06-17 | End: 2025-06-17 | Stop reason: HOSPADM

## 2025-06-17 RX ADMIN — METHYLNALTREXONE BROMIDE 12 MG: 12 INJECTION, SOLUTION SUBCUTANEOUS at 11:56

## 2025-06-17 RX ADMIN — CYCLOBENZAPRINE HYDROCHLORIDE 10 MG: 10 TABLET, FILM COATED ORAL at 15:29

## 2025-06-17 RX ADMIN — SPIRONOLACTONE 25 MG: 25 TABLET ORAL at 09:38

## 2025-06-17 RX ADMIN — METOPROLOL TARTRATE 25 MG: 25 TABLET, FILM COATED ORAL at 11:56

## 2025-06-17 RX ADMIN — CYCLOBENZAPRINE HYDROCHLORIDE 10 MG: 10 TABLET, FILM COATED ORAL at 05:57

## 2025-06-17 RX ADMIN — LACTULOSE 30 G: 10 SOLUTION ORAL at 09:39

## 2025-06-17 RX ADMIN — POLYETHYLENE GLYCOL 3350 17 G: 17 POWDER, FOR SOLUTION ORAL at 09:41

## 2025-06-17 RX ADMIN — POTASSIUM CHLORIDE 40 MEQ: 1500 TABLET, EXTENDED RELEASE ORAL at 11:56

## 2025-06-17 RX ADMIN — PANTOPRAZOLE SODIUM 40 MG: 40 TABLET, DELAYED RELEASE ORAL at 05:57

## 2025-06-17 RX ADMIN — LACTULOSE 40 G: 10 SOLUTION ORAL at 12:04

## 2025-06-17 RX ADMIN — RIFAXIMIN 550 MG: 550 TABLET ORAL at 09:38

## 2025-06-17 RX ADMIN — TORSEMIDE 20 MG: 20 TABLET ORAL at 09:38

## 2025-06-17 NOTE — ASSESSMENT & PLAN NOTE
Pt well known to our service. Hx of cirrhosis with HE. If she is taking her lactulose 4x daily at home, suspect she is constipated from chronic opiates which she seems to take more often at home compared to inpatient. She apparently was doing well when in rehab but developed HE symptoms about a day after being discharged.   -Recommend one time dose of relistor while here for suspected OIC  - when outpatient, continue QID lactulose but recommend adding linzess 145mcg as well to her regimen. Movantik is another option outpatient for OIC  - lactulose can be increased if needed but this may cause Gi side effects at higher doses  - continue xifaxan  -Closely monitor BMs while admitted    Child Wilder class A  Ascites  Diuretics on torsemide and aldactone at home- held initially this admission  Low Na diet, 2g/day  Hepatic encephalopathy  Ammonia 118 on admission, now improved  No HE this AM  Continue lactulose 30g QID and xifaxan 550mg BID  Esophageal varices  EGD 1/18/25 no EV   HCC screening  CT A/P no mass 12/2/24   No mass 6/16/25 US. AFP wnl  Patient's MELD Score is: 16    MELD Score 90-day mortality   <=9 1.9%   10-19 6.0%   20-29 19.6%   30-39 52.6%   >=40 71.3%     MELD Score Component Values:  Component Value Date   Creatinine: 0.88 mg/dL 6/16/2025   Dialysis at least twice   in the past week  Or CVVHD for >=24 hours   in the past week:     No    Bilirubin, total: 2.18 mg/dL 6/16/2025   INR: 1.78 6/14/2025   Sodium: 141 mmol/L 6/16/2025

## 2025-06-17 NOTE — ASSESSMENT & PLAN NOTE
Date of Service: 01/18/2021    PREOPERATIVE DIAGNOSIS:  Ischemic bowel.    POSTOPERATIVE DIAGNOSIS:  Ischemic bowel.    OPERATIVE PROCEDURE:  Exploratory laparotomy with ileo-ascending colostomy.    SURGEON:  Jose G Corrales MD.    ASSISTANT:   Juan Bower SA.     ANESTHESIA:  General endotracheal.    INDICATIONS:  This is a 63-year-old gentleman status post exploratory laparotomy and resection of some of the distal small intestine for ischemia.  He will return to the operating room for a second look and potential reanastomosis.  He has now been hemodynamically stable with full resuscitation.  Risks and benefits of surgery could not be discussed.  The family could not be contacted, several messages were left on the phone and this is medical necessity.    DESCRIPTION OF PROCEDURE:  The patient was taken to the operating suite, placed in the supine position.  His abdomen was prepped and draped in sterile fashion.  Time-out was performed.  The ABTHERA is removed.  Abdominal cavity was irrigated with Normal Saline solution.  The small bowel was brought out into the abdomen and run twice.  He has patchy areas of residual ischemia along the antimesenteric borders and patchy distribution, but no full-thickness necrosis.  All the small bowel was pink.  It is perfused and viable and gigi.  He has good palpable pulse throughout the mesentery base out into the arcades and some visible pulsation along the smaller arcades leading to the base of the small intestine.  There were no obvious signs of venous thrombosis.  Abdominal cavity is irrigated out.  The small bowel had been resected right at the terminal ileum right at the cecum.  Therefore, there is not enough length to do small bowel to small bowel anastomosis.  The anastomosis will be to the cecum.  The right colon was mobilized with taking down the peritoneal reflection and mobilizing the cecum up out into the wound.  Using the LigaSure device, the  Known history of liver cirrhosis with recurrent admissions due to hepatic encephalopathy and constipation.  Initially torsemide and Aldactone on hold secondary to hepatic encephalopathy -resumed  Referral made to hepatology   appendiceal artery was taken down and the appendix was amputated and the enterotomy was made along the antimesenteric border of the cecum through this enterotomy.  The corner of the staple line at the small intestine also on the antimesenteric border is removed using the [employer] green stapler, it was fired twice along the antimesenteric border to create a functional end-to-end anastomosis.  However, when carefully examining the inside of the mucosa, there was mucosal necrosis of the cecum, right towards the end of the staple line.  Cecum on the outside; however, looked well with good perfusion and was pink and no sign of ischemia on the outside.  The small bowel had good normal mucosa.  Because of the inner necrosis this anastomosis will need to be redone.  Using the LigaSure Impact device, the mesentery to the cecum is taken down, preserving the ileocolonic vessel.  The small bowel mesentery was taken down to remove the anastomosis.  Using the green [employer] 60 stapler the end of the cecum was resected of the area of necrosis.  Stapler was also fired across the distal small bowel.  The previous anastomosis and the tip of the cecum are removed and this was sent to pathology.    The right colon was mobilized more by taking down the hepatic flexure and the ascending colon moves out into the abdominal wound easily.  Great care was taken to avoid any injury to any deep structures.  The duodenum was well intact and well away from dissection and the ureter was also well away from the dissection.  I was able to preserve the ileocolonic vessel.  Next, the small bowel was positioned appropriately along the antimesenteric border of the ascending colon.  The corner of the staple line is removed from the cecum and from the small bowel using a green Endo regular LOIS-75 stapler and anastomosis was created, creating a functional end-to-end anastomosis with a wide open staple line.  The inner portions of the anastomosis are  very pink and well perfused.  No signs of necrosis.  The residual enterotomy was closed with interrupted 2-0 silk Lembert sutures for excellent closure and some of the fat is closed over the enterotomy closure for a patch.  Anastomosis was widely patent.  Abdominal cavity was then irrigated with IrriSept, then with Normal Saline.  A 19-Spanish round drain was placed into the right upper quadrant along the right gutter.  There was no mesenteric defect to close.  Carefully, the small bowel was then placed back into the abdominal cavity carefully so there was no twisting or turning on the anastomosis and all the small bowel fit in well within the abdominal cavity, despite some mild swelling.  The midline fascia was closed with a running looped 0 PDS suture.  Skin was closed with staples, leaving gaps with 2-inch Iodoform gauze packing.  The patient tolerated procedure well.        Dictated By: Jose G Corrales MD  Signing Provider: MD BARRY Rowland/myra ([id number])  DD: 01/18/2021 14:48:18 TD: 01/18/2021 15:02:27    Copy Sent To:

## 2025-06-17 NOTE — PHYSICAL THERAPY NOTE
"     PHYSICAL THERAPY TREATMENT NOTE        NAME:  Bailey Olsen  DATE: 06/17/25    Length Of Stay: 2  Performed at least 2 patient identifiers during session: Name and Birthday       TREATMENT FLOW SHEET:       06/17/25 1417   PT Last Visit   PT Visit Date 06/17/25   Note Type   Note Type Treatment   Pain Assessment   Pain Assessment Tool 0-10   Pain Score 7   Pain Location/Orientation Orientation: Lower;Location: Back;Orientation: Right;Location: Abdomen   Restrictions/Precautions   Weight Bearing Precautions Per Order No   Other Precautions Chair Alarm;Bed Alarm;Fall Risk;Pain   General   Chart Reviewed Yes   Response to Previous Treatment Patient with no complaints from previous session.   Family/Caregiver Present Yes  (spouse)   Cognition   Overall Cognitive Status WFL   Arousal/Participation Cooperative   Orientation Level Oriented X4   Following Commands Follows one step commands without difficulty   Subjective   Subjective \"I'm going home\"   Bed Mobility   Supine to Sit 5  Supervision   Additional items HOB elevated;Increased time required   Sit to Supine 5  Supervision   Transfers   Sit to Stand 5  Supervision   Additional items Increased time required   Stand to Sit 5  Supervision   Stand pivot 5  Supervision   Additional items Verbal cues   Additional Comments RW   Ambulation/Elevation   Gait pattern Improper Weight shift;Short stride   Gait Assistance 5  Supervision   Additional items Verbal cues   Assistive Device Rolling walker   Distance 250 ft   Stair Management Assistance 5  Supervision   Additional items Verbal cues   Stair Management Technique Two rails;Alternating pattern   Number of Stairs 2   Balance   Static Sitting Normal   Dynamic Sitting Fair +   Static Standing Fair +   Dynamic Standing Fair -   Ambulatory Fair   Activity Tolerance   Activity Tolerance Patient tolerated treatment well   Assessment   Prognosis Fair   Assessment Pt tolerates tx session well.  She progresses to ambulating in " hallway and navigating steps.  She reports that she can go home instead of rehab now that she has gained her mobility back.  Pt reports her vision is back to normal and it appears her speech is much improved as well.  Resource intensity changed to level III due to pt's progress.   Barriers to Discharge Other (Comment)   Barriers to Discharge Comments fall risk; high re-admit risk; home alone during the day   Goals   STG Expiration Date 06/30/25   PT Treatment Day 1   Plan   Progress Progressing toward goals   PT Frequency 2-3x/wk   Discharge Recommendation   Rehab Resource Intensity Level, PT III (Minimum Resource Intensity)   Additional Comments Using RW (pt owns) with increased support   Additional Comments 2 Spouse states that he is taking off work the rest of the week to stay with pt   AM-PAC Basic Mobility Inpatient   Turning in Flat Bed Without Bedrails 4   Lying on Back to Sitting on Edge of Flat Bed Without Bedrails 3   Moving Bed to Chair 3   Standing Up From Chair Using Arms 3   Walk in Room 3   Climb 3-5 Stairs With Railing 3   Basic Mobility Inpatient Raw Score 19   Basic Mobility Standardized Score 42.48   [facility] Highest Level Of Mobility   -HLM Goal 6: Walk 10 steps or more   JH-HLM Achieved 8: Walk 250 feet ot more   Education   Education Provided Mobility training;Assistive device   Patient Demonstrates verbal understanding   End of Consult   Patient Position at End of Consult Supine;Bed/Chair alarm activated;All needs within reach  (spouse present)       The patient's AM-PAC Basic Mobility Inpatient Short Form Raw Score is 19. A Raw score of greater than 16 suggests the patient may benefit from discharge to home. Please also refer to the recommendation of the Physical Therapist for safe discharge planning.         Otoniel Lafleur, PT,DPT

## 2025-06-17 NOTE — PLAN OF CARE
Problem: OCCUPATIONAL THERAPY ADULT  Goal: Performs self-care activities at highest level of function for planned discharge setting.  See evaluation for individualized goals.  Description: Treatment Interventions: ADL retraining, Functional transfer training, UE strengthening/ROM, Endurance training, Cognitive reorientation, Patient/family training, Equipment evaluation/education, Compensatory technique education, Energy conservation, Activityengagement          See flowsheet documentation for full assessment, interventions and recommendations.   Outcome: Progressing  Note: Limitation: Decreased ADL status, Decreased UE strength, Decreased Safe judgement during ADL, Decreased endurance, Decreased cognition, Decreased self-care trans, Decreased high-level ADLs, Mood limitation  Prognosis: Fair  Assessment: Patient participated in Skilled OT session this date with interventions consisting of ADL re training with the use of correct body mechnaics, Energy Conservation techniques, Work simplification skills , safety awareness and fall prevention techniques,  therapeutic activities to: increase activity tolerance, increase cardiovascular endurance , and increase dynamic sit/ stand balance during functional activity  . Patient agreeable to OT treatment session, upon arrival patient was found supine in bed. Patient requiring verbal cues for safety, verbal cues for pacing thru activity steps, and frequent rest periods. Patient continues to be functioning below baseline level, occupational performance remains limited secondary to factors listed above and increased risk for falls and injury. The patient's raw score on the -PAC Daily Activity Inpatient Short Form is 19. A raw score of greater than or equal to 19 suggests the patient may benefit from discharge to home. Please refer to the recommendation of the Occupational Therapist for safe discharge planning. From OT standpoint, recommendation at time of d/c would be level  3, min resource intensity.     Rehab Resource Intensity Level, OT: III (Minimum Resource Intensity)

## 2025-06-17 NOTE — ASSESSMENT & PLAN NOTE
Ammonia 135 on admission  Patient currently on 40 g lactulose QID  Denied BM today   GI following  Today ammonia 37, resolved

## 2025-06-17 NOTE — PROGRESS NOTES
Progress Note - Hospitalist   Name: Bailey Olsen 54 y.o. female I MRN: [id number]  Unit/Bed#: -01 I Date of Admission: 6/14/2025   Date of Service: 6/17/2025 I Hospital Day: 2  { ?Quick Links I Problem List I PORCH I Billing Tip:82282}  Assessment & Plan  Hepatic encephalopathy (HCC)  Vision loss  Presents to ED with confusion, generalized weakness -had been home for 1 day since discharge from SNF  Has history of Hyatt with recurrent issues with hepatic encephalopathy  Managed at home on lactulose 30 g 4 times daily and rifaximin     Ammonia 135 on admission-118 - 57 - 50  Aggressive lactulose dosing decreased to 30 g p.o. 4 times daily with additional MiraLAX  Intake and output with BM counts to assist with lactulose titration  Avoid any sedatives or hepatotoxic medications if at all possible  Reported vision loss on 6/15 -neurology contacted  Stat CT head without stroke  MRI brain wo contrast -diffusely increased cortical signal abnormality involving bilateral cerebral and cerebral hemispheres, basal ganglia, thalami and brainstem -worrisome for severe toxic/metabolic encephalopathy, due to history concerning for severe hepatic encephalopathy, additional ddx: Seizure related changes, possibly underlying infectious/inflammatory encephalitis  MRI brain with contrast -no pathologic enhancement  Continue neurochecks  Symptoms are greatly improved today patient near baseline  PT/OT recommend level II rehab placement  Liver cirrhosis secondary to HYATT (nonalcoholic steatohepatitis) (HCC)  Known history of liver cirrhosis with recurrent admissions due to hepatic encephalopathy and constipation.  Initially torsemide and Aldactone on hold secondary to hepatic encephalopathy  Resumed   Better oral intake noted   Hypokalemia  Potassium chronically low-maintain on 40 mill equivalents daily as an outpatient  Continue potassium supplementation today  Improved today    History of malignant carcinoid tumor of small  intestine  No further intervention at this time  Pancytopenia (HCC)  Chronic pancytopenia  Continue to trend labs  Currently no signs of bleeding  Hyperammonemia (HCC)  Ammonia 135 on admission  Patient currently on 40 g lactulose QID  Denied BM today   GI following  Today ammonia 37, resolved     VTE Pharmacologic Prophylaxis:   Moderate Risk (Score 3-4) - Pharmacological DVT Prophylaxis Contraindicated. Sequential Compression Devices Ordered.    Mobility:   Basic Mobility Inpatient Raw Score: 20  JH-HLM Goal: 6: Walk 10 steps or more  JH-HLM Achieved: 6: Walk 10 steps or more  JH-HLM Goal NOT achieved. Continue with multidisciplinary rounding and encourage appropriate mobility to improve upon JH-HLM goals.    Patient Centered Rounds: I performed bedside rounds with nursing staff today.   Discussions with Specialists or Other Care Team Provider: GI, speech pathology, case management, PT, OT    Education and Discussions with Family / Patient: Updated  () at bedside.    Current Length of Stay: 2 day(s)  Current Patient Status: Inpatient   Certification Statement: The patient will continue to require additional inpatient hospital stay due to continuing to trend ammonia levels, awaiting placement per case management  Discharge Plan: Anticipate discharge tomorrow to rehab facility.    Code Status: Level 1 - Full Code    Subjective   Patient is feeling well today, wishes to go home. She is alert and oriented.     Objective :{?Quick Links I ICU Summary I Vitals I I/Os I LDAs I Mobility (PT/OT) I Code Status / ACP   ?Quick Links I Active Meds I Pain Meds I Antibiotics I Anticoagulants:71580}  Temp:  [97.2 °F (36.2 °C)] 97.2 °F (36.2 °C)  HR:  [] 103  BP: (115-151)/(67-79) 137/67  Resp:  [18] 18  SpO2:  [97 %-99 %] 99 %  O2 Device: None (Room air)    Body mass index is 40.51 kg/m².     Input and Output Summary (last 24 hours):     Intake/Output Summary (Last 24 hours) at 6/17/2025 1340  Last data  filed at 6/17/2025 0801  Gross per 24 hour   Intake 660 ml   Output --   Net 660 ml       Physical Exam  Vitals and nursing note reviewed.   Constitutional:       General: She is not in acute distress.     Appearance: She is well-developed. She is obese.   HENT:      Head: Normocephalic and atraumatic.      Mouth/Throat:      Mouth: Mucous membranes are moist.     Eyes:      Conjunctiva/sclera: Conjunctivae normal.       Cardiovascular:      Rate and Rhythm: Normal rate and regular rhythm.      Pulses: Normal pulses.      Heart sounds: Normal heart sounds. No murmur heard.  Pulmonary:      Effort: Pulmonary effort is normal. No respiratory distress.      Breath sounds: Normal breath sounds.   Abdominal:      Palpations: Abdomen is soft.      Tenderness: There is no abdominal tenderness.     Musculoskeletal:         General: No swelling.      Cervical back: Normal range of motion and neck supple.     Skin:     General: Skin is warm and dry.      Capillary Refill: Capillary refill takes less than 2 seconds.     Neurological:      General: No focal deficit present.      Mental Status: She is alert. Mental status is at baseline.     Psychiatric:         Mood and Affect: Mood normal.         Behavior: Behavior normal.         Thought Content: Thought content normal.           Lines/Drains:            {?Quick Links I Lab Review I Micro Results I Radiology I Cardiology:47619}  Lab Results: I have reviewed the following results:   Results from last 7 days   Lab Units 06/17/25  0647   WBC Thousand/uL 2.43*   HEMOGLOBIN g/dL 8.8*   HEMATOCRIT % 28.1*   PLATELETS Thousands/uL 55*     Results from last 7 days   Lab Units 06/17/25  0647   SODIUM mmol/L 136   POTASSIUM mmol/L 3.2*   CHLORIDE mmol/L 102   CO2 mmol/L 28   BUN mg/dL 9   CREATININE mg/dL 0.96   ANION GAP mmol/L 6   CALCIUM mg/dL 8.0*   ALBUMIN g/dL 2.7*   TOTAL BILIRUBIN mg/dL 1.77*   ALK PHOS U/L 234*   ALT U/L 23   AST U/L 50*   GLUCOSE RANDOM mg/dL 179*      Results from last 7 days   Lab Units 06/14/25  1512   INR  1.78*     Results from last 7 days   Lab Units 06/14/25  1510   POC GLUCOSE mg/dl 105     Results from last 7 days   Lab Units 06/16/25  0530   HEMOGLOBIN A1C % 5.1           Recent Cultures (last 7 days):         Imaging Results Review: No pertinent imaging studies reviewed.  Other Study Results Review: No additional pertinent studies reviewed.    Last 24 Hours Medication List:     Current Facility-Administered Medications:     amitriptyline (ELAVIL) tablet 50 mg, HS    atorvastatin (LIPITOR) tablet 40 mg, QPM    cyclobenzaprine (FLEXERIL) tablet 10 mg, Q8H MIGUEL    ketorolac (TORADOL) injection 15 mg, Q6H PRN    lactulose (CHRONULAC) oral solution 40 g, 4x Daily    metoprolol tartrate (LOPRESSOR) tablet 25 mg, Q12H MIGUEL    pantoprazole (PROTONIX) EC tablet 40 mg, Early Morning    polyethylene glycol (MIRALAX) packet 17 g, Daily    rifaximin (XIFAXAN) tablet 550 mg, Q12H MIGUEL    spironolactone (ALDACTONE) tablet 25 mg, Daily    torsemide (DEMADEX) tablet 20 mg, Daily    Administrative Statements   Today, Patient Was Seen By: Mireille Maria  {Time Spent (Optional):63382}    **Please Note: This note may have been constructed using a voice recognition system.**

## 2025-06-17 NOTE — NURSING NOTE
Patient left discharge instructions in the room. Attempted to call patient. Laura did review discharge instructions with spouse prior to discharge.

## 2025-06-17 NOTE — PLAN OF CARE
Problem: PAIN - ADULT  Goal: Verbalizes/displays adequate comfort level or baseline comfort level  Description: Interventions:  - Encourage patient to monitor pain and request assistance  - Assess pain using appropriate pain scale  - Administer analgesics as ordered based on type and severity of pain and evaluate response  - Implement non-pharmacological measures as appropriate and evaluate response  - Consider cultural and social influences on pain and pain management  - Notify physician/advanced practitioner if interventions unsuccessful or patient reports new pain  - Educate patient/family on pain management process including their role and importance of  reporting pain   - Provide non-pharmacologic/complimentary pain relief interventions  Outcome: Progressing     Problem: INFECTION - ADULT  Goal: Absence or prevention of progression during hospitalization  Description: INTERVENTIONS:  - Assess and monitor for signs and symptoms of infection  - Monitor lab/diagnostic results  - Monitor all insertion sites, i.e. indwelling lines, tubes, and drains  - Monitor endotracheal if appropriate and nasal secretions for changes in amount and color  - [place] appropriate cooling/warming therapies per order  - Administer medications as ordered  - Instruct and encourage patient and family to use good hand hygiene technique  - Identify and instruct in appropriate isolation precautions for identified infection/condition  Outcome: Progressing  Goal: Absence of fever/infection during neutropenic period  Description: INTERVENTIONS:  - Monitor WBC  - Perform strict hand hygiene  - Limit to healthy visitors only  - No plants, dried, fresh or silk flowers with ortiz in patient room  Outcome: Progressing     Problem: SAFETY ADULT  Goal: Patient will remain free of falls  Description: INTERVENTIONS:  - Educate patient/family on patient safety including physical limitations  - Instruct patient to call for assistance with activity   -  Consider consulting OT/PT to assist with strengthening/mobility based on AM PAC & JH-HLM score  - Consult OT/PT to assist with strengthening/mobility   - Keep Call bell within reach  - Keep bed low and locked with side rails adjusted as appropriate  - Keep care items and personal belongings within reach  - Initiate and maintain comfort rounds  - Make Fall Risk Sign visible to staff  - Offer Toileting every 2 Hours, in advance of need  - Initiate/Maintain bed alarm    - Apply yellow socks and bracelet for high fall risk patients  - Consider moving patient to room near nurses station  Outcome: Progressing  Goal: Maintain or return to baseline ADL function  Description: INTERVENTIONS:  -  Assess patient's ability to carry out ADLs; assess patient's baseline for ADL function and identify physical deficits which impact ability to perform ADLs (bathing, care of mouth/teeth, toileting, grooming, dressing, etc.)  - Assess/evaluate cause of self-care deficits   - Assess range of motion  - Assess patient's mobility; develop plan if impaired  - Assess patient's need for assistive devices and provide as appropriate  - Encourage maximum independence but intervene and supervise when necessary  - Involve family in performance of ADLs  - Assess for home care needs following discharge   - Consider OT consult to assist with ADL evaluation and planning for discharge  - Provide patient education as appropriate  - Monitor functional capacity and physical performance, use of AM PAC & JH-HLM   - Monitor gait, balance and fatigue with ambulation    Outcome: Progressing  Goal: Maintains/Returns to pre admission functional level  Description: INTERVENTIONS:  - Perform AM-PAC 6 Click Basic Mobility/ Daily Activity assessment daily.  - Set and communicate daily mobility goal to care team and patient/family/caregiver.   - Collaborate with rehabilitation services on mobility goals if consulted  - Perform Range of Motion 3 times a day.  -  Reposition patient every 2 hours.  - Dangle patient 2 times a day  - Out of bed for meals 2 times a day  - Out of bed for toileting  - Record patient progress and toleration of activity level   Outcome: Progressing     Problem: DISCHARGE PLANNING  Goal: Discharge to home or other facility with appropriate resources  Description: INTERVENTIONS:  - Identify barriers to discharge w/patient and caregiver  - Arrange for needed discharge resources and transportation as appropriate  - Identify discharge learning needs (meds, wound care, etc.)  - Arrange for interpretive services to assist at discharge as needed  - Refer to Case Management Department for coordinating discharge planning if the patient needs post-hospital services based on physician/advanced practitioner order or complex needs related to functional status, cognitive ability, or social support system  Outcome: Progressing     Problem: Knowledge Deficit  Goal: Patient/family/caregiver demonstrates understanding of disease process, treatment plan, medications, and discharge instructions  Description: Complete learning assessment and assess knowledge base.  Interventions:  - Provide teaching at level of understanding  - Provide teaching via preferred learning methods  Outcome: Progressing     Problem: Nutrition/Hydration-ADULT  Goal: Nutrient/Hydration intake appropriate for improving, restoring or maintaining nutritional needs  Description: Monitor and assess patient's nutrition/hydration status for malnutrition. Collaborate with interdisciplinary team and initiate plan and interventions as ordered.  Monitor patient's weight and dietary intake as ordered or per policy. Utilize nutrition screening tool and intervene as necessary. Determine patient's food preferences and provide high-protein, high-caloric foods as appropriate.     INTERVENTIONS:  - Monitor oral intake, urinary output, labs, and treatment plans  - Assess nutrition and hydration status and recommend  course of action  - Evaluate amount of meals eaten  - Assist patient with eating if necessary   - Allow adequate time for meals  - Recommend/ encourage appropriate diets, oral nutritional supplements, and vitamin/mineral supplements  - Order, calculate, and assess calorie counts as needed  - Recommend, monitor, and adjust tube feedings and TPN/PPN based on assessed needs  - Assess need for intravenous fluids  - Provide specific nutrition/hydration education as appropriate  - Include patient/family/caregiver in decisions related to nutrition  Outcome: Progressing     Problem: Prexisting or High Potential for Compromised Skin Integrity  Goal: Skin integrity is maintained or improved  Description: INTERVENTIONS:  - Identify patients at risk for skin breakdown  - Assess and monitor skin integrity including under and around medical devices   - Assess and monitor nutrition and hydration status  - Monitor labs  - Assess for incontinence   - Turn and reposition patient  - Assist with mobility/ambulation  - Relieve pressure over mariano prominences   - Avoid friction and shearing  - Provide appropriate hygiene as needed including keeping skin clean and dry  - Evaluate need for skin moisturizer/barrier cream  - Collaborate with interdisciplinary team  - Patient/family teaching  - Consider wound care consult    Assess:  - Review Royce scale daily  - Clean and moisturize skin every shift  - Inspect skin when repositioning, toileting, and assisting with ADLS    - Assess extremities for adequate circulation and sensation     Bed Management:  - Have minimal linens on bed & keep smooth, unwrinkled  - Change linens as needed when moist or perspiring  - Avoid sitting or lying in one position for more than 2 hours while in bed?Keep HOB at 30 degrees   - Toileting:  - Offer bedside commode  - Assess for incontinence every 2  - Use incontinent care products after each incontinent episode such as barrier wipes    Activity:  - Mobilize  patient 3 times a day  - Encourage activity and walks on unit  - Encourage or provide ROM exercises   - Turn and reposition patient every 2 Hours  - Use appropriate equipment to lift or move patient in bed      Skin Care:  - Avoid use of baby powder, tape, friction and shearing, hot water or constrictive clothing  - Relieve pressure over bony prominences using pillows  - Do not massage red bony areas    Next Steps:    Outcome: Progressing     Problem: Neurological Deficit  Goal: Neurological status is stable or improving  Description: Interventions:  - Monitor and assess patient's level of consciousness, motor function, sensory function, and level of assistance needed for ADLs.   - Monitor and report changes from baseline. Collaborate with interdisciplinary team to initiate plan and implement interventions as ordered.   - Provide and maintain a safe environment.  - Consider seizure precautions.  - Consider fall precautions.  - Consider aspiration precautions.  - Consider bleeding precautions.  Outcome: Progressing     Problem: Communication Impairment  Goal: Ability to express needs and understand communication  Description: Assess patient's communication skills and ability to understand information.  Patient will demonstrate use of effective communication techniques, alternative methods of communication and understanding even if not able to speak.     - Encourage communication and provide alternate methods of communication as needed.  - Collaborate with case management/ for discharge needs.  - Include patient/family/caregiver in decisions related to communication.  Outcome: Progressing     Problem: Nutrition  Goal: Nutrition/Hydration status is improving  Description: Monitor and assess patient's nutrition/hydration status for malnutrition (ex- brittle hair, bruises, dry skin, pale skin and conjunctiva, muscle wasting, smooth red tongue, and disorientation). Collaborate with interdisciplinary team  and initiate plan and interventions as ordered.  Monitor patient's weight and dietary intake as ordered or per policy. Utilize nutrition screening tool and intervene per policy. Determine patient's food preferences and provide high-protein, high-caloric foods as appropriate.     - Assist patient with eating.  - Allow adequate time for meals.  - Encourage patient to take dietary supplement as ordered.  - Collaborate with clinical nutritionist.  - Include patient/family/caregiver in decisions related to nutrition.  Outcome: Progressing

## 2025-06-17 NOTE — PLAN OF CARE
Problem: PHYSICAL THERAPY ADULT  Goal: Performs mobility at highest level of function for planned discharge setting.  See evaluation for individualized goals.  Description: Treatment/Interventions: Functional transfer training, LE strengthening/ROM, Elevations, Therapeutic exercise, Endurance training, Patient/family training, Equipment eval/education, Bed mobility, Gait training, Compensatory technique education, OT          See flowsheet documentation for full assessment, interventions and recommendations.  Outcome: Progressing  Note: Prognosis: Fair  Problem List: Decreased strength, Decreased endurance, Impaired balance, Decreased mobility, Obesity  Assessment: Pt tolerates tx session well.  She progresses to ambulating in hallway and navigating steps.  She reports that she can go home instead of rehab now that she has gained her mobility back.  Pt reports her vision is back to normal and it appears her speech is much improved as well.  Resource intensity changed to level III due to pt's progress.  Barriers to Discharge: Other (Comment)  Barriers to Discharge Comments: fall risk; high re-admit risk; home alone during the day  Rehab Resource Intensity Level, PT: III (Minimum Resource Intensity)    See flowsheet documentation for full assessment.

## 2025-06-17 NOTE — PROGRESS NOTES
Progress Note - Gastroenterology   Name: Bailey Olsen 54 y.o. female I MRN: [id number]  Unit/Bed#: -01 I Date of Admission: 6/14/2025   Date of Service: 6/17/2025 I Hospital Day: 2    Assessment & Plan  Hepatic encephalopathy (HCC)  Pt well known to our service. Hx of cirrhosis with HE. If she is taking her lactulose 4x daily at home, suspect she is constipated from chronic opiates which she seems to take more often at home compared to inpatient. She apparently was doing well when in rehab but developed HE symptoms about a day after being discharged.   -Recommend one time dose of relistor while here for suspected OIC  - when outpatient, continue QID lactulose but recommend adding linzess 145mcg as well to her regimen. Movantik is another option outpatient for OIC  - lactulose can be increased if needed but this may cause Gi side effects at higher doses  - continue xifaxan  -Closely monitor BMs while admitted    Child Wilder class A  Ascites  Diuretics on torsemide and aldactone at home- held initially this admission  Low Na diet, 2g/day  Hepatic encephalopathy  Ammonia 118 on admission, now improved  No HE this AM  Continue lactulose 30g QID and xifaxan 550mg BID  Esophageal varices  EGD 1/18/25 no EV   HCC screening  CT A/P no mass 12/2/24   No mass 6/16/25 US. AFP wnl  Patient's MELD Score is: 16    MELD Score 90-day mortality   <=9 1.9%   10-19 6.0%   20-29 19.6%   30-39 52.6%   >=40 71.3%     MELD Score Component Values:  Component Value Date   Creatinine: 0.88 mg/dL 6/16/2025   Dialysis at least twice   in the past week  Or CVVHD for >=24 hours   in the past week:     No    Bilirubin, total: 2.18 mg/dL 6/16/2025   INR: 1.78 6/14/2025   Sodium: 141 mmol/L 6/16/2025      Liver cirrhosis secondary to HYATT (nonalcoholic steatohepatitis) (HCC)  Pt never followed up outpatient despite numerous documented calls and letters being sent. I confirmed that her address and phone number are correct with her and her   today. Will need outpatient hepatology follow up.   History of malignant carcinoid tumor of small intestine    Pancytopenia (HCC)      HPI: Bailey Olsen is a 54 y.o. 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 Ammonia elevated again.     No BM yesterday or today reported. Mental status improving oriented x3. Asking for taco salad. Answers all questions appropriately. Discussing her bowels at home, she has 1 large BM daily on average. She seems to go less at home compared to when admitted/in rehab. Seems to take opiates more frequently at home.         Medications Prior to Admission:     lidocaine (LIDODERM) 5 %    metoprolol tartrate (LOPRESSOR) 25 mg tablet    midodrine (PROAMATINE) 2.5 mg tablet    potassium chloride (Klor-Con M20) 20 mEq tablet    amitriptyline (ELAVIL) 50 mg tablet    cyclobenzaprine (FLEXERIL) 10 mg tablet    dapsone 25 mg tablet    folic acid (FOLVITE) 1 mg tablet    lactulose (CHRONULAC) 10 g/15 mL solution    pantoprazole (PROTONIX) 40 mg tablet    polyethylene glycol (MIRALAX) 17 g packet    rifaximin (XIFAXAN) 550 mg tablet    spironolactone (ALDACTONE) 25 mg tablet    torsemide (DEMADEX) 20 mg tablet    ursodiol (ACTIGALL) 300 mg capsule    Current Facility-Administered Medications:     amitriptyline (ELAVIL) tablet 50 mg, HS    atorvastatin (LIPITOR) tablet 40 mg, QPM    cyclobenzaprine (FLEXERIL) tablet 10 mg, Q8H MIGUEL    ketorolac (TORADOL) injection 15 mg, Q6H PRN    lactulose (CHRONULAC) oral solution 30 g, 4x Daily    pantoprazole (PROTONIX) EC tablet 40 mg, Early Morning    polyethylene glycol (MIRALAX) packet 17 g, Daily    rifaximin (XIFAXAN) tablet 550 mg, Q12H MIGUEL    spironolactone (ALDACTONE) tablet 25 mg, Daily    torsemide (DEMADEX) tablet 20 mg, Daily  Allergies[1]    Physical Exam  Constitutional:       General: She is not in acute  distress.     Appearance: Normal appearance. She is not ill-appearing.   HENT:      Head: Normocephalic and atraumatic.     Eyes:      Conjunctiva/sclera: Conjunctivae normal.     Pulmonary:      Effort: Pulmonary effort is normal.   Abdominal:      General: Abdomen is flat. There is no distension.      Palpations: Abdomen is soft.      Tenderness: There is no abdominal tenderness.     Skin:     General: Skin is warm and dry.      Coloration: Skin is not jaundiced.     Neurological:      Mental Status: She is alert and oriented to person, place, and time.     Psychiatric:         Mood and Affect: Mood normal.         Behavior: Behavior normal.       Most Recent Vital Signs:  Vitals:    06/16/25 1938 06/16/25 2028 06/16/25 2241 06/17/25 0802   BP:   115/67 151/79   BP Location:       Pulse:  96 99 103   Resp:   18 18   Temp:   (!) 97.2 °F (36.2 °C) (!) 97.2 °F (36.2 °C)   TempSrc:       SpO2: 98%  97% 99%   Weight:       Height:           Intake/Output Summary (Last 24 hours) at 6/17/2025 0936  Last data filed at 6/17/2025 0801  Gross per 24 hour   Intake 1380 ml   Output 142 ml   Net 1238 ml       LABS/IMAGING  Lab Results: I have reviewed all relevant lab results during this hospitalization.    Imaging Studies:  I have reviewed all the relevant images during this hospitalizations    Counseling / Coordination of Care  Total time spent today 30 minutes. Greater than 50% of total time was spent with the patient and / or family counseling and / or coordination of care.    Rad Manzano PA-C         [1]   Allergies  Allergen Reactions    Zolpidem GI Intolerance, Other (See Comments) and Hives     Other Reaction(s): Other      Blacks out    Blacks out    Tapentadol Sneezing and Other (See Comments)     Zoned out    Alprazolam Other (See Comments)     Other Reaction(s): Other      Feels zoned out    Feels zoned out    Amoxicillin-Pot Clavulanate Hives and Nausea Only     Amoxil fine    Dust Mite Extract Sneezing     Morphine Headache, Nausea Only and Vomiting    Tramadol GI Intolerance    Azelastine Rash    Azelastine Hcl Rash    Molds & Smuts Rash     Other Reaction(s): Unknown

## 2025-06-17 NOTE — ESCALATED TEAM TX
Team Meeting Note    Patient name Bailey Olsen  Location /-01 MRN [id number]  : 1970 Date 2025       Team Meeting  Meeting Type: Escalated Tx Team Meeting  Initial Conference Date: 25    Team Members Present  Team Members Present: Physician, , Nurse,   Physician Team Member: Laura Buckner PAC  Nursing Team Member: Johanne Santiago, RN; Tejal Barrios, RN  Care Management Team Member: OBINNA Gorman  Social Work Team Member: OBINNA Gorman    Patient/Family Present  Patient Present: Yes  Patient's Family Present: Yes  Family Relationship: Spouse  Spouse: Cristian    Team Meeting - Additional Comments: Tx team met to review STR recommendaiton and patient's decision. Patient has 3 medicare days covered at 100% and then would be in her copay days for rehab (over $200). Patient wishes to [facility]. Paultent feels that 2-3 days of rehab would not be beneficial at this time and wishes to be DC home. Patient's family support's patient's decision.     CM to follow patient's care and discharge needs.

## 2025-06-17 NOTE — OCCUPATIONAL THERAPY NOTE
Occupational Therapy Progress Note     Patient Name: Bailey Olsen  Today's Date: 6/17/2025  Problem List  Principal Problem:    Hepatic encephalopathy (HCC)  Active Problems:    Liver cirrhosis secondary to HYATT (nonalcoholic steatohepatitis) (HCC)    History of malignant carcinoid tumor of small intestine    Hypokalemia    Pancytopenia (HCC)    Hyperammonemia (HCC)    Vision loss          06/17/25 1454   OT Last Visit   OT Visit Date 06/17/25   Note Type   Note Type Treatment   Pain Assessment   Pain Assessment Tool 0-10   Pain Score 7   Pain Location/Orientation Orientation: Lower;Location: Back   Restrictions/Precautions   Weight Bearing Precautions Per Order No   Other Precautions Bed Alarm;Fall Risk;Pain   ADL   Where Assessed Other (Comment)  (standard toilet, standing at sink)   Grooming Assistance 5  Supervision/Setup   Grooming Deficit Wash/dry hands;Standing with assistive device;Verbal cueing;Other (Comment)  (pt requires verbal cues for sequencing and proper use of soap dispenser)   LB Dressing Assistance 5  Supervision/Setup   LB Dressing Deficit Thread RLE into underwear;Thread LLE into underwear;Pull up over hips;Requires assistive device for steadying;Verbal cueing;Increased time to complete   Toileting Assistance  5  Supervision/Setup   Toileting Deficit Verbal cueing;Increased time to complete;Grab bar use;Perineal hygiene;Clothing management down;Clothing management up;Other (Comment)  (Verbal cues for proper hygiene techniques)   Toileting Comments Pt requires verbal cues throughout for safety and sequencing tasks.Pt limited by deficits in cognition, strength, balance, and functional endurance.   Bed Mobility   Supine to Sit 5  Supervision   Additional items HOB elevated;Increased time required;Verbal cues   Sit to Supine 5  Supervision   Additional items Increased time required;Verbal cues   Additional Comments Pt requests to return to supine in bed at end of session, all needs within reach.    Transfers   Sit to Stand 5  Supervision   Additional items Verbal cues;Impulsive   Stand to Sit 5  Supervision   Additional items Impulsive;Verbal cues   Stand pivot 5  Supervision   Additional items Impulsive;Verbal cues   Toilet transfer 5  Supervision   Additional items Standard toilet;Impulsive;Verbal cues   Additional Comments RW used   Functional Mobility   Functional Mobility 5  Supervision   Additional Comments Pt participates in FM within room using RW, SPV for safety due to impuslive behavior with urge to have BM. Pt on RA with SPO2 remaining 97% or greater. Pt requires verbal cues for safety with RW. No LOB noted.   Additional items Rolling walker   Cognition   Overall Cognitive Status Impaired   Arousal/Participation Alert;Cooperative   Attention Attends with cues to redirect   Orientation Level Oriented X4   Memory Decreased short term memory   Following Commands Follows one step commands without difficulty   Comments Pt demo confusion with use of soap dispenser, sequencing hand hygiene, and proper hygiene techniques following BM; requiring verbal cues.   Activity Tolerance   Activity Tolerance Patient limited by pain;Patient limited by fatigue   Medical Staff Made Aware RN Johanne   Assessment   Assessment Patient participated in Skilled OT session this date with interventions consisting of ADL re training with the use of correct body mechnaics, Energy Conservation techniques, Work simplification skills , safety awareness and fall prevention techniques,  therapeutic activities to: increase activity tolerance, increase cardiovascular endurance , and increase dynamic sit/ stand balance during functional activity  . Patient agreeable to OT treatment session, upon arrival patient was found supine in bed. Patient requiring verbal cues for safety, verbal cues for pacing thru activity steps, and frequent rest periods. Patient continues to be functioning below baseline level, occupational performance remains  limited secondary to factors listed above and increased risk for falls and injury. The patient's raw score on the AM-PAC Daily Activity Inpatient Short Form is 19. A raw score of greater than or equal to 19 suggests the patient may benefit from discharge to home. Please refer to the recommendation of the Occupational Therapist for safe discharge planning. From OT standpoint, recommendation at time of d/c would be level 3, min resource intensity.   Plan   Treatment Interventions ADL retraining;Functional transfer training;Endurance training;Compensatory technique education;Energy conservation;Activityengagement   Goal Expiration Date 06/30/25   OT Treatment Day 2   OT Frequency 2-3x/wk   Discharge Recommendation   Rehab Resource Intensity Level, OT III (Minimum Resource Intensity)   AM-PAC Daily Activity Inpatient   Lower Body Dressing 3   Bathing 3   Toileting 3   Upper Body Dressing 3   Grooming 3   Eating 4   Daily Activity Raw Score 19   Daily Activity Standardized Score (Calc for Raw Score >=11) 40.22   -PAC Applied Cognition Inpatient   Following a Speech/Presentation 2   Understanding Ordinary Conversation 3   Taking Medications 3   Remembering Where Things Are Placed or Put Away 3   Remembering List of 4-5 Errands 2   Taking Care of Complicated Tasks 2   Applied Cognition Raw Score 15   Applied Cognition Standardized Score 33.54     Pt will benefit from continued OT services in order to maximize (I) c ADL performance, FM c least restrictive AD, and improve overall endurance/strength required to complete functional tasks in preparation for d/c.    Pt left supine in bed at end of session; all needs within reach; all lines intact.    Grace Raya, OTR/L

## 2025-06-17 NOTE — DISCHARGE SUMMARY
Discharge Summary - Hospitalist   Name: Bailey Olsen 54 y.o. female I MRN: [id number]  Unit/Bed#: -01 I Date of Admission: 6/14/2025   Date of Service: 6/17/2025 I Hospital Day: 2     Assessment & Plan  Hepatic encephalopathy (HCC) (Resolved: 6/17/2025)  Vision loss (Resolved: 6/17/2025)  Hyperammonemia (HCC) (Resolved: 6/17/2025)  Presents to ED with confusion, generalized weakness -had been home for 1 day since discharge from SNF  Has history of Hyatt with recurrent issues with hepatic encephalopathy  Managed at home on lactulose 30 g 4 times daily and rifaximin     Ammonia 135 on admission-118 - 57 - 50 - 37  Hyperammonia anemia present on admission and now resolved  Aggressive lactulose dosing decreased to 30 g p.o. 4 times daily with additional MiraLAX  Intake and output with BM counts to assist with lactulose titration  Avoid any sedatives or hepatotoxic medications if at all possible  Reported vision loss on 6/15 -neurology contacted  Stat CT head without stroke  MRI brain wo contrast -diffusely increased cortical signal abnormality involving bilateral cerebral and cerebral hemispheres, basal ganglia, thalami and brainstem -worrisome for severe toxic/metabolic encephalopathy, due to history concerning for severe hepatic encephalopathy, additional ddx: Seizure related changes, possibly underlying infectious/inflammatory encephalitis  MRI brain with contrast -no pathologic enhancement  Continue neurochecks  Patient's mentation has improved to her baseline  PT/OT recommending rehab, escalated care team meeting had between myself case management and nursing -patient declines rehab and would agreeable for the home health care  Upon reassessment patient did improve and PT/OT recommending level 3 can go home with home health  On discharge will continue lactulose and Linzess added to regimen to help prevent any constipation  Liver cirrhosis secondary to HYATT (nonalcoholic steatohepatitis) (HCC)  Known history  of liver cirrhosis with recurrent admissions due to hepatic encephalopathy and constipation.  Initially torsemide and Aldactone on hold secondary to hepatic encephalopathy -resumed  Referral made to hepatology  Hypokalemia  Potassium chronically low-maintain on 40 mill equivalents daily as an outpatient  Continue potassium supplementation today  Improved today -continue prior supplementation    History of malignant carcinoid tumor of small intestine  No further intervention at this time  Pancytopenia (HCC)  Chronic pancytopenia  Continue to trend labs  Currently no signs of bleeding     Medical Problems       Resolved Problems  Date Reviewed: 6/14/2025          Resolved    * (Principal) Hepatic encephalopathy (HCC) 6/17/2025     Resolved by  Laura Bell PA-C    Hyperammonemia (HCC) 6/17/2025     Resolved by  Laura Bell PA-C    Vision loss 6/17/2025     Resolved by  Laura Bell PA-C        Discharging Physician / Practitioner: Laura Bell PA-C  PCP: Nayely Brown DO  Admission Date:   Admission Orders (From admission, onward)       Ordered        06/16/25 1314  INPATIENT ADMISSION  Once            06/14/25 1659  Place in Observation  Once            06/14/25 1620  Inpatient Admission  Once,   Status:  Canceled                          Discharge Date: 06/17/25    Next Steps for Physician/AP Assuming Care:  Follow-up with patient regarding referral to hepatology    Test Results Pending at Discharge (will require follow up):  None    Medication Changes for Discharge & Rationale:   Linzess added to regimen to help avoid constipation  Encouraged her to stop utilizing oxycodone or any opioids due to worsening constipation  See after visit summary for reconciled discharge medications provided to patient and/or family.     Consultations During Hospital Stay:  Gastroenterology  Neurology    Procedures Performed:   None    Significant Findings / Test Results:   MRI brain w contrast  MRI brain wo contrast - Addendum  Date: 6/15/2025  Diffusely increased cortical signal abnormality involving the bilateral cerebral and cerebellar hemispheres, basal ganglia, thalami, and brainstem. Findings are worrisome for an underlying severe toxic/metabolic encephalopathy. Given patient's history of  cirrhosis, findings are highly concerning for severe hepatic encephalopathy/hyperammonemia. Additional differential considerations include seizure related changes or possibly an underlying infections/inflammatory encephalitis. Recommend further evaluation with a contrast enhanced brain MRI for full characterization.  Addendum: T2/FLAIR hyperintense signal around the dorsomedial thalami and periaqueductal grey matter could also suggest a component of superimposed Wernicke's encephalopathy on a background of hepatic encephalopathy.     CT head wo contrast - Result Date: 6/15/2025  No acute intracranial abnormality. Workstation performed: [id number]     XR chest 1 view portable - Result Date: 6/15/2025  No acute cardiopulmonary disease. Left upper lobe groundglass opacity better shown on CT.     CTA stroke alert (head/neck) - Result Date: 6/14/2025  1.  No intracranial large vessel occlusion or hemodynamically significant stenosis in the neck. 2.  Moderate narrowing at the P2-P3 junction of the left PCA. 3.  Unchanged groundglass mass in the left upper lobe.     CT stroke alert brain - Result Date: 6/14/2025  No hemorrhage or CT evidence of an acute cortical infarct      Incidental Findings:   None     Hospital Course:   Bailey Olsen is a 54 y.o. female patient PMH of HYATT cirrhosis, chronic hypokalemia, chronic pancytopenia, history of malignant carcinoid tumor of small intestine, recurrent readmissions for hepatic encephalopathy who originally presented to the hospital on 6/14/2025 due to concern for altered mental status.  Patient was found to have elevated ammonia level and started treatment for hepatic encephalopathy.  She was a stroke alert as  "well due to altered mental status and initial CT head and CTA head and neck negative for acute stroke.  She started to wake up more and had improvement in ammonia levels, then had vision loss noted so repeat stat head CT was performed with no acute intercranial abnormality and MRI done which showed diffuse cortical signal abnormality with severe encephalopathy.  Repeat MRI with contrast showed no acute abnormality and patient's mental status returned to normal with her ammonia returning to normal.  PT/OT initially recommending rehab, patient declines and except home health.  Repeat PT and OT assessments stay patient has improved in level 3.         Please see above list of diagnoses and related plan for additional information.     Discharge Day Visit / Exam:   Subjective: Seen and examined.  Patient had a bowel movement this evening.  Mentation is back to baseline  Vitals: Blood Pressure: 137/67 (06/17/25 1156)  Pulse: 103 (06/17/25 1156)  Temperature: (!) 97.2 °F (36.2 °C) (06/17/25 0802)  Temp Source: Temporal (06/16/25 0910)  Respirations: 18 (06/17/25 0802)  Height: 5' 6\" (167.6 cm) (06/16/25 1253)  Weight - Scale: 114 kg (251 lb) (06/16/25 1030)  SpO2: 99 % (06/17/25 0802)  Physical Exam  Vitals and nursing note reviewed.   Constitutional:       General: She is not in acute distress.     Appearance: Normal appearance. She is obese. She is not ill-appearing.   HENT:      Head: Normocephalic and atraumatic.      Nose: No congestion.      Mouth/Throat:      Mouth: Mucous membranes are moist.      Pharynx: Oropharynx is clear.     Eyes:      Conjunctiva/sclera: Conjunctivae normal.       Cardiovascular:      Rate and Rhythm: Normal rate and regular rhythm.      Pulses: Normal pulses.      Heart sounds: Normal heart sounds. No murmur heard.  Pulmonary:      Effort: Pulmonary effort is normal. No respiratory distress.      Breath sounds: Normal breath sounds.   Abdominal:      General: Bowel sounds are normal.      " Palpations: Abdomen is soft.      Tenderness: There is no abdominal tenderness.     Musculoskeletal:         General: Normal range of motion.      Cervical back: Normal range of motion.      Right lower leg: No edema.      Left lower leg: No edema.     Skin:     General: Skin is warm and dry.     Neurological:      Mental Status: She is alert and oriented to person, place, and time.     Psychiatric:         Mood and Affect: Mood normal.         Behavior: Behavior normal.          Discussion with Family: Updated  () at bedside.    Discharge instructions/Information to patient and family:   See after visit summary for information provided to patient and family.      Provisions for Follow-Up Care:  See after visit summary for information related to follow-up care and any pertinent home health orders.      Mobility at time of Discharge:   Basic Mobility Inpatient Raw Score: 19  JH-HLM Goal: 6: Walk 10 steps or more  JH-HLM Achieved: 8: Walk 250 feet ot more  HLM Goal achieved. Continue to encourage appropriate mobility.     Disposition:   Home with [facility] Services (Reminder: Complete face to face encounter)    Planned Readmission: None    Administrative Statements   Discharge Statement:  I have spent a total time of 45 minutes in caring for this patient on the day of the visit/encounter. >30 minutes of time was spent on: Diagnostic results, Prognosis, Risks and benefits of tx options, Instructions for management, Patient and family education, Importance of tx compliance, Risk factor reductions, Impressions, Counseling / Coordination of care, Documenting in the medical record, Reviewing / ordering tests, medicine, procedures  , and Communicating with other healthcare professionals .    **Please Note: This note may have been constructed using a voice recognition system**

## 2025-06-17 NOTE — ASSESSMENT & PLAN NOTE
Known history of liver cirrhosis with recurrent admissions due to hepatic encephalopathy and constipation.  Initially torsemide and Aldactone on hold secondary to hepatic encephalopathy  Resumed   Better oral intake noted

## 2025-06-17 NOTE — DISCHARGE INSTR - AVS FIRST PAGE
Dear Bailey Olsen,     It was our pleasure to care for you here at [facility]. For follow up as well as any medication refills, we recommend that you follow up with your primary care physician. Here are the most important instructions/ recommendations at discharge:     Notable Medication Adjustments -   Start taking Linzess - for constipation - take daily   Take lactulose 30 g 4 times a day   Avoid opioids (oxycodone) as they cause constipation   Important follow up information -   Follow up with GI/hepatology (liver specialists)   Other Instructions -   Monitor your bowel movements and call doctor if less than 2-3 a day for advice   Please review this entire after visit summary as additional general instructions including medication list, appointments, activity, diet, any pertinent wound care, and other additional recommendations from your care team that may be provided for you.      Sincerely,     Laura Bell PA-C

## 2025-06-17 NOTE — CASE MANAGEMENT
Case Management Discharge Planning Note    Patient name Bailey Olsen  Location /-01 MRN [id number]  : 1970 Date 2025       Current Admission Date: 2025  Current Admission Diagnosis:Hepatic encephalopathy (HCC)   Patient Active Problem List    Diagnosis Date Noted    Vision loss 06/15/2025    Stage 3a chronic kidney disease (HCC) 2025    Scalp hematoma 2025    Contusion of back 2025    Ecchymosis 2025    Falls 2025    Chest pain 2025    Abdominal pain 2025    Acute kidney injury (HCC) 2025    Gram-negative bacteremia 2025    Acute respiratory failure (HCC) 2025    Carcinoid tumor of pancreas 2025    Hypomagnesemia 2025    Upper GI bleed 2025    Acute anemia 2025    Rectal bleeding 2025    Acute blood loss anemia 2025    Chronic, continuous use of opioids 2025    Acute metabolic encephalopathy 2025    SIRS (systemic inflammatory response syndrome) (HCC) 2025    Sacral fracture, closed (HCC) 11/15/2024    KISHORE (acute kidney injury) (HCC) 10/04/2024    Morbid obesity with BMI of 40.0-44.9, adult (HCC) 10/04/2024    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) 2024    Fall from standing 2024    Closed compression fracture of L1 lumbar vertebra, sequela 2024    Closed stable burst fracture of first lumbar vertebra with routine healing 2024    Groin hematoma 2024    Ambulatory dysfunction 2024    Intractable back pain 2024    Hyperammonemia (HCC) 2024    Hepatic encephalopathy (HCC) 2024    Fall 2024    Liver cirrhosis secondary to HYATT (nonalcoholic steatohepatitis) (HCC) 2024    History of malignant carcinoid tumor of small intestine 2024    Class 3 severe obesity due to excess calories in adult 2024    Primary hypertension 2024    Hypokalemia 2024     Fibromyalgia 05/26/2024    Iron deficiency anemia 05/26/2024    Pancytopenia (HCC) 05/26/2024    Sarcoidosis 05/26/2024    Bilateral pulmonary infiltrates 05/26/2024      LOS (days): 2  Geometric Mean LOS (GMLOS) (days): 3.2  Days to GMLOS:2.1     OBJECTIVE:  Risk of Unplanned Readmission Score: 65.4         Current admission status: Inpatient   Preferred Pharmacy:   [facility] [address] [place] [address] [place] - [address] [facility]  [address] [facility] [address]  Phone: [phone] Fax: [phone]    [id number] [place] [place] - [address] [facility]  [address] [facility] [address]  Phone: [phone] Fax: [phone]    Primary Care Provider: Nayely Brown DO    Primary Insurance: MEDICARE  Secondary Insurance: BLUE CROSS    DISCHARGE DETAILS:    Discharge planning discussed with:: Patient, spouse  Freedom of Choice: Yes  Comments - Freedom of Choice: At this time not agreeable to STR, wishes for HHC [facility]  CM contacted family/caregiver?: Yes  Were Treatment Team discharge recommendations reviewed with patient/caregiver?: Yes  Did patient/caregiver verbalize understanding of patient care needs?: Yes  Were patient/caregiver advised of the risks associated with not following Treatment Team discharge recommendations?: Yes    Contacts  Patient Contacts: Cristian Olsen- spouse  Relationship to Patient:: Family  Contact Method: In Person  Reason/Outcome: Discharge Planning    Requested Home Health Care         Is the patient interested in HHC at discharge?: Yes  Home Health Discipline requested:: Occupational Therapy, Nursing, Physical Therapy  Home Health Agency Name:: [facility]  HHA External Referral Reason (only applicable if external HHA name selected): Patient has established relationship with provider  Home Health Follow-Up Provider:: PCP  Home Health Services Needed:: Evaluate Functional Status and Safety, Strengthening/Theraputic Exercises to Improve  Function, Gait/ADL Training  Homebound Criteria Met:: Requires the Assistance of Another Person for Safe Ambulation or to Leave the Home, Uses an Assist Device (i.e. cane, walker, etc)  Supporting Clincal Findings:: Limited Endurance, Fatigues Easliy in Short Distances    DME Referral Provided  Referral made for DME?: No    Other Referral/Resources/Interventions Provided:  Interventions: [facility]    Would you like to participate in our [facility] service program?  : No - Declined    Treatment Team Recommendation: Short Term Rehab, SNF  Expected Discharge Disposition: Home Health Services  Additional Discharge Dispositions: Home Health Services          Please see note under escalated team meeting.     CM updated [facility] on DC plan.     CM Updated [facility] on DC plan.     CM to follow patient's care and discharge needs.

## 2025-06-17 NOTE — ASSESSMENT & PLAN NOTE
Potassium chronically low-maintain on 40 mill equivalents daily as an outpatient  Continue potassium supplementation today  Improved today -continue prior supplementation

## 2025-06-17 NOTE — ASSESSMENT & PLAN NOTE
Presents to ED with confusion, generalized weakness -had been home for 1 day since discharge from SNF  Has history of Larsen with recurrent issues with hepatic encephalopathy  Managed at home on lactulose 30 g 4 times daily and rifaximin     Ammonia 135 on admission-118 - 57 - 50  Aggressive lactulose dosing decreased to 30 g p.o. 4 times daily with additional MiraLAX  Intake and output with BM counts to assist with lactulose titration  Avoid any sedatives or hepatotoxic medications if at all possible  Reported vision loss on 6/15 -neurology contacted  Stat CT head without stroke  MRI brain wo contrast -diffusely increased cortical signal abnormality involving bilateral cerebral and cerebral hemispheres, basal ganglia, thalami and brainstem -worrisome for severe toxic/metabolic encephalopathy, due to history concerning for severe hepatic encephalopathy, additional ddx: Seizure related changes, possibly underlying infectious/inflammatory encephalitis  MRI brain with contrast -no pathologic enhancement  Continue neurochecks  Symptoms are greatly improved today patient near baseline  PT/OT recommend level II rehab placement

## 2025-06-17 NOTE — CONSULTS
"Consult received for stroke pathway. A1c WNL, HDL 22. Pt reports not following a special diet at home. Typically skips breakfast. Lunch may be turkey sandwich with mustard. Snacks on crackers in between meals or yogurt or jello or italian ice (regular varieties). Dinner typically pasta dish with mozarella cheese reportedly. \"I drink more than I eat\", regular coke and sprite. Snacking on frito chips and skittles during time of visit. Pt with hepatic encephalopathy, liver cirrhosis 2' HYATT, +morbid obesity. Pt stating she lost 10lb while in rehab due to improved diet quality and was initially interested in diet modification to lose more weight. Offered diet instruction as OP though pt stating she cannot get to appointments \"I dont drive anymore\", offered telehealth/virtual visit as OP though declined stating she knows what she needs to do. Suggested switching to sugar free beverage options to promote wt loss though pt does not appear interested in this. Pt declining diet instruction \"I just want taco salad.\"   "

## 2025-06-17 NOTE — PROGRESS NOTES
Patient:    MRN:  [id number]    Elielin Request ID:  [id number]    Level of care reserved:  Skilled Nursing Facility    Partner Reserved:  [facility]  , [place] [address] [phone]    Clinical needs requested:    Geography searched:  20 miles around [address]    Start of Service:    Request sent:  1:26pm EDT on 6/16/2025 by Anastasiya Rondon    Partner reserved:  2:21pm EDT on 6/17/2025 by Anastasiya Rondon    Choice list shared:  12:18pm EDT on 6/17/2025 by Anastasiya Rondon

## 2025-06-17 NOTE — ASSESSMENT & PLAN NOTE
Presents to ED with confusion, generalized weakness -had been home for 1 day since discharge from SNF  Has history of Larsen with recurrent issues with hepatic encephalopathy  Managed at home on lactulose 30 g 4 times daily and rifaximin     Ammonia 135 on admission-118 - 57 - 50 - 37  Hyperammonia anemia present on admission and now resolved  Aggressive lactulose dosing decreased to 30 g p.o. 4 times daily with additional MiraLAX  Intake and output with BM counts to assist with lactulose titration  Avoid any sedatives or hepatotoxic medications if at all possible  Reported vision loss on 6/15 -neurology contacted  Stat CT head without stroke  MRI brain wo contrast -diffusely increased cortical signal abnormality involving bilateral cerebral and cerebral hemispheres, basal ganglia, thalami and brainstem -worrisome for severe toxic/metabolic encephalopathy, due to history concerning for severe hepatic encephalopathy, additional ddx: Seizure related changes, possibly underlying infectious/inflammatory encephalitis  MRI brain with contrast -no pathologic enhancement  Continue neurochecks  Patient's mentation has improved to her baseline  PT/OT recommending rehab, escalated care team meeting had between myself case management and nursing -patient declines rehab and would agreeable for the home health care  Upon reassessment patient did improve and PT/OT recommending level 3 can go home with home health  On discharge will continue lactulose and Linzess added to regimen to help prevent any constipation

## 2025-06-18 NOTE — PROGRESS NOTES
Patient:    MRN:  [id number]    [facility] Request ID:  [id number]    Level of care reserved:  Home Health Agency    Partner Reserved:  [facility] [place] [address] [phone]    Clinical needs requested:    Geography searched:  [address]    Start of Service:    Request sent:  1:32pm EDT on 6/16/2025 by Anastasiya Rondon    Partner reserved:  11:03pm EDT on 6/17/2025 by Anastasiya Rondon    Choice list shared:  11:03pm EDT on 6/17/2025 by Anastasiya Rondon

## 2025-06-18 NOTE — CASE MANAGEMENT
Case Management Discharge Planning Note    Patient name Bailey Olsen  Location /-01 MRN [id number]  : 1970 Date 2025       Current Admission Date: 2025  Current Admission Diagnosis:Hypokalemia   Patient Active Problem List    Diagnosis Date Noted    Stage 3a chronic kidney disease (HCC) 2025    Scalp hematoma 2025    Contusion of back 2025    Ecchymosis 2025    Falls 2025    Chest pain 2025    Abdominal pain 2025    Acute kidney injury (HCC) 2025    Gram-negative bacteremia 2025    Acute respiratory failure (HCC) 2025    Carcinoid tumor of pancreas 2025    Hypomagnesemia 2025    Upper GI bleed 2025    Acute anemia 2025    Rectal bleeding 2025    Acute blood loss anemia 2025    Chronic, continuous use of opioids 2025    Acute metabolic encephalopathy 2025    SIRS (systemic inflammatory response syndrome) ([facility]) 2025    Sacral fracture, closed ([facility]) 11/15/2024    KISHORE (acute kidney injury) ([facility]) 10/04/2024    Morbid obesity with BMI of 40.0-44.9, adult ([facility]) 10/04/2024    Pathological fracture of vertebra due to secondary osteoporosis ([facility]) 2024    Fall from standing 2024    Closed compression fracture of L1 lumbar vertebra, sequela 2024    Closed stable burst fracture of first lumbar vertebra with routine healing 2024    Groin hematoma 2024    Ambulatory dysfunction 2024    Intractable back pain 2024    Fall 2024    Liver cirrhosis secondary to HYATT (nonalcoholic steatohepatitis) (HCC) 2024    History of malignant carcinoid tumor of small intestine 2024    Class 3 severe obesity due to excess calories in adult 2024    Primary hypertension 2024    Hypokalemia 2024    Fibromyalgia 2024    Iron deficiency anemia 2024    Pancytopenia (HCC) 2024    Sarcoidosis 2024    Bilateral  pulmonary infiltrates 05/26/2024      LOS (days): 2  Geometric Mean LOS (GMLOS) (days): 3.2  Days to GMLOS:2     OBJECTIVE:  Risk of Unplanned Readmission Score: 59.86         Current admission status: Inpatient   Preferred Pharmacy:   [id number] - [place] - [address] [facility]  [address] [facility] [address]  Phone: [phone] Fax: [phone]    [id number] - [place], [place] - [facility] [address]  Phone: [phone] Fax: [phone]    Primary Care Provider: Nayely Brown DO    Primary Insurance: MEDICARE  Secondary Insurance: BLUE CROSS    DISCHARGE DETAILS:     CM uploaded AVS and HHC orders to [facility] - [facility] updated on DC placed.

## 2025-06-30 ENCOUNTER — TELEPHONE (OUTPATIENT)
Age: 55
End: 2025-06-30

## 2025-07-16 ENCOUNTER — APPOINTMENT (INPATIENT)
Dept: RADIOLOGY | Facility: HOSPITAL | Age: 55
DRG: 551 | End: 2025-07-16
Payer: MEDICARE

## 2025-07-16 ENCOUNTER — APPOINTMENT (EMERGENCY)
Dept: RADIOLOGY | Facility: HOSPITAL | Age: 55
DRG: 551 | End: 2025-07-16
Payer: MEDICARE

## 2025-07-16 ENCOUNTER — APPOINTMENT (EMERGENCY)
Dept: CT IMAGING | Facility: HOSPITAL | Age: 55
DRG: 551 | End: 2025-07-16
Payer: MEDICARE

## 2025-07-16 ENCOUNTER — HOSPITAL ENCOUNTER (INPATIENT)
Facility: HOSPITAL | Age: 55
LOS: 6 days | Discharge: NURSING, REHAB OR LONG-TERM CARE | DRG: 551 | End: 2025-07-22
Attending: EMERGENCY MEDICINE | Admitting: FAMILY MEDICINE
Payer: MEDICARE

## 2025-07-16 DIAGNOSIS — R94.31 QT PROLONGATION: ICD-10-CM

## 2025-07-16 DIAGNOSIS — N17.9 AKI (ACUTE KIDNEY INJURY) (HCC): ICD-10-CM

## 2025-07-16 DIAGNOSIS — K75.81 LIVER CIRRHOSIS SECONDARY TO NASH (NONALCOHOLIC STEATOHEPATITIS) (HCC): ICD-10-CM

## 2025-07-16 DIAGNOSIS — S62.609A FINGER FRACTURE, RIGHT: ICD-10-CM

## 2025-07-16 DIAGNOSIS — R51.9 HEADACHE: ICD-10-CM

## 2025-07-16 DIAGNOSIS — M54.9 INTRACTABLE BACK PAIN: ICD-10-CM

## 2025-07-16 DIAGNOSIS — R26.2 AMBULATORY DYSFUNCTION: ICD-10-CM
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
## 2025-07-16 DIAGNOSIS — S22.089A: ICD-10-CM

## 2025-07-16 DIAGNOSIS — K74.60 LIVER CIRRHOSIS SECONDARY TO NASH (NONALCOHOLIC STEATOHEPATITIS) (HCC): ICD-10-CM

## 2025-07-16 DIAGNOSIS — E87.6 HYPOKALEMIA: ICD-10-CM

## 2025-07-16 PROBLEM — S62.602A: Status: ACTIVE | Noted: 2025-07-16

## 2025-07-16 LAB
ALBUMIN SERPL BCG-MCNC: 2.5 G/DL (ref 3.5–5)
ALP SERPL-CCNC: 212 U/L (ref 34–104)
ALT SERPL W P-5'-P-CCNC: 32 U/L (ref 7–52)
ANION GAP SERPL CALCULATED.3IONS-SCNC: 8 MMOL/L (ref 4–13)
ANION GAP SERPL CALCULATED.3IONS-SCNC: 9 MMOL/L (ref 4–13)
APTT PPP: 38 SECONDS (ref 23–34)
AST SERPL W P-5'-P-CCNC: 72 U/L (ref 13–39)
ATRIAL RATE: 92 BPM
BASOPHILS # BLD AUTO: 0.02 THOUSANDS/ÂΜL (ref 0–0.1)
BASOPHILS NFR BLD AUTO: 1 % (ref 0–1)
BILIRUB SERPL-MCNC: 3.06 MG/DL (ref 0.2–1)
BUN SERPL-MCNC: 10 MG/DL (ref 5–25)
BUN SERPL-MCNC: 11 MG/DL (ref 5–25)
CALCIUM ALBUM COR SERPL-MCNC: 8.6 MG/DL (ref 8.3–10.1)
CALCIUM SERPL-MCNC: 7.4 MG/DL (ref 8.4–10.2)
CALCIUM SERPL-MCNC: 7.6 MG/DL (ref 8.4–10.2)
CHLORIDE SERPL-SCNC: 89 MMOL/L (ref 96–108)
CHLORIDE SERPL-SCNC: 92 MMOL/L (ref 96–108)
CO2 SERPL-SCNC: 35 MMOL/L (ref 21–32)
CO2 SERPL-SCNC: 42 MMOL/L (ref 21–32)
CREAT SERPL-MCNC: 0.98 MG/DL (ref 0.6–1.3)
CREAT SERPL-MCNC: 1.09 MG/DL (ref 0.6–1.3)
EOSINOPHIL # BLD AUTO: 0.01 THOUSAND/ÂΜL (ref 0–0.61)
EOSINOPHIL NFR BLD AUTO: 0 % (ref 0–6)
ERYTHROCYTE [DISTWIDTH] IN BLOOD BY AUTOMATED COUNT: 17.9 % (ref 11.6–15.1)
GFR SERPL CREATININE-BSD FRML MDRD: 57 ML/MIN/1.73SQ M
GFR SERPL CREATININE-BSD FRML MDRD: 65 ML/MIN/1.73SQ M
GLUCOSE SERPL-MCNC: 115 MG/DL (ref 65–140)
GLUCOSE SERPL-MCNC: 166 MG/DL (ref 65–140)
HCT VFR BLD AUTO: 27.5 % (ref 34.8–46.1)
HGB BLD-MCNC: 8.6 G/DL (ref 11.5–15.4)
IMM GRANULOCYTES # BLD AUTO: 0.01 THOUSAND/UL (ref 0–0.2)
IMM GRANULOCYTES NFR BLD AUTO: 0 % (ref 0–2)
INR PPP: 1.95 (ref 0.85–1.19)
LYMPHOCYTES # BLD AUTO: 0.56 THOUSANDS/ÂΜL (ref 0.6–4.47)
LYMPHOCYTES NFR BLD AUTO: 18 % (ref 14–44)
MCH RBC QN AUTO: 30.3 PG (ref 26.8–34.3)
MCHC RBC AUTO-ENTMCNC: 31.3 G/DL (ref 31.4–37.4)
MCV RBC AUTO: 97 FL (ref 82–98)
MONOCYTES # BLD AUTO: 0.19 THOUSAND/ÂΜL (ref 0.17–1.22)
MONOCYTES NFR BLD AUTO: 6 % (ref 4–12)
NEUTROPHILS # BLD AUTO: 2.26 THOUSANDS/ÂΜL (ref 1.85–7.62)
NEUTS SEG NFR BLD AUTO: 75 % (ref 43–75)
NRBC BLD AUTO-RTO: 0 /100 WBCS
P AXIS: 66 DEGREES
PLATELET # BLD AUTO: 68 THOUSANDS/UL (ref 149–390)
PMV BLD AUTO: 10.8 FL (ref 8.9–12.7)
POTASSIUM SERPL-SCNC: 2.1 MMOL/L (ref 3.5–5.3)
POTASSIUM SERPL-SCNC: 2.7 MMOL/L (ref 3.5–5.3)
PR INTERVAL: 186 MS
PROT SERPL-MCNC: 6.4 G/DL (ref 6.4–8.4)
PROTHROMBIN TIME: 23.1 SECONDS (ref 12.3–15)
QRS AXIS: 13 DEGREES
QRSD INTERVAL: 102 MS
QT INTERVAL: 460 MS
QTC INTERVAL: 568 MS
RBC # BLD AUTO: 2.84 MILLION/UL (ref 3.81–5.12)
SODIUM SERPL-SCNC: 136 MMOL/L (ref 135–147)
SODIUM SERPL-SCNC: 139 MMOL/L (ref 135–147)
T WAVE AXIS: 12 DEGREES
VENTRICULAR RATE: 92 BPM
WBC # BLD AUTO: 3.05 THOUSAND/UL (ref 4.31–10.16)

## 2025-07-16 PROCEDURE — 93005 ELECTROCARDIOGRAM TRACING: CPT

## 2025-07-16 PROCEDURE — 99284 EMERGENCY DEPT VISIT MOD MDM: CPT

## 2025-07-16 PROCEDURE — 36415 COLL VENOUS BLD VENIPUNCTURE: CPT | Performed by: EMERGENCY MEDICINE

## 2025-07-16 PROCEDURE — 93010 ELECTROCARDIOGRAM REPORT: CPT | Performed by: INTERNAL MEDICINE

## 2025-07-16 PROCEDURE — 70450 CT HEAD/BRAIN W/O DYE: CPT

## 2025-07-16 PROCEDURE — 73564 X-RAY EXAM KNEE 4 OR MORE: CPT

## 2025-07-16 PROCEDURE — 85610 PROTHROMBIN TIME: CPT | Performed by: EMERGENCY MEDICINE

## 2025-07-16 PROCEDURE — 80053 COMPREHEN METABOLIC PANEL: CPT | Performed by: EMERGENCY MEDICINE

## 2025-07-16 PROCEDURE — 71260 CT THORAX DX C+: CPT

## 2025-07-16 PROCEDURE — 96365 THER/PROPH/DIAG IV INF INIT: CPT

## 2025-07-16 PROCEDURE — 74177 CT ABD & PELVIS W/CONTRAST: CPT

## 2025-07-16 PROCEDURE — 72080 X-RAY EXAM THORACOLMB 2/> VW: CPT

## 2025-07-16 PROCEDURE — 85730 THROMBOPLASTIN TIME PARTIAL: CPT | Performed by: EMERGENCY MEDICINE

## 2025-07-16 PROCEDURE — 80048 BASIC METABOLIC PNL TOTAL CA: CPT | Performed by: FAMILY MEDICINE

## 2025-07-16 PROCEDURE — 96375 TX/PRO/DX INJ NEW DRUG ADDON: CPT

## 2025-07-16 PROCEDURE — 99285 EMERGENCY DEPT VISIT HI MDM: CPT | Performed by: EMERGENCY MEDICINE

## 2025-07-16 PROCEDURE — 85025 COMPLETE CBC W/AUTO DIFF WBC: CPT | Performed by: EMERGENCY MEDICINE

## 2025-07-16 PROCEDURE — 73140 X-RAY EXAM OF FINGER(S): CPT

## 2025-07-16 PROCEDURE — 99223 1ST HOSP IP/OBS HIGH 75: CPT | Performed by: FAMILY MEDICINE

## 2025-07-16 RX ORDER — POTASSIUM CHLORIDE 14.9 MG/ML
20 INJECTION INTRAVENOUS
Status: COMPLETED | OUTPATIENT
Start: 2025-07-16 | End: 2025-07-17

## 2025-07-16 RX ORDER — TORSEMIDE 20 MG/1
20 TABLET ORAL DAILY
Status: DISCONTINUED | OUTPATIENT
Start: 2025-07-16 | End: 2025-07-17

## 2025-07-16 RX ORDER — FENTANYL CITRATE 50 UG/ML
25 INJECTION, SOLUTION INTRAMUSCULAR; INTRAVENOUS ONCE
Refills: 0 | Status: COMPLETED | OUTPATIENT
Start: 2025-07-16 | End: 2025-07-16

## 2025-07-16 RX ORDER — OXYCODONE HYDROCHLORIDE 10 MG/1
10 TABLET ORAL EVERY 6 HOURS PRN
Status: ON HOLD | COMMUNITY
Start: 2025-07-14 | End: 2025-07-22

## 2025-07-16 RX ORDER — POTASSIUM CHLORIDE 20MEQ/15ML
40 LIQUID (ML) ORAL ONCE
Status: COMPLETED | OUTPATIENT
Start: 2025-07-16 | End: 2025-07-16

## 2025-07-16 RX ORDER — METOPROLOL TARTRATE 25 MG/1
25 TABLET, FILM COATED ORAL EVERY 12 HOURS SCHEDULED
Status: DISCONTINUED | OUTPATIENT
Start: 2025-07-16 | End: 2025-07-21

## 2025-07-16 RX ORDER — LISINOPRIL 40 MG/1
40 TABLET ORAL DAILY
COMMUNITY
Start: 2025-07-14 | End: 2025-07-22

## 2025-07-16 RX ORDER — LACTULOSE 10 G/15ML
30 SOLUTION ORAL 4 TIMES DAILY
Status: DISCONTINUED | OUTPATIENT
Start: 2025-07-16 | End: 2025-07-22 | Stop reason: HOSPADM

## 2025-07-16 RX ORDER — SPIRONOLACTONE 25 MG/1
25 TABLET ORAL DAILY
Status: DISCONTINUED | OUTPATIENT
Start: 2025-07-16 | End: 2025-07-17

## 2025-07-16 RX ORDER — HEPARIN SODIUM 5000 [USP'U]/ML
5000 INJECTION, SOLUTION INTRAVENOUS; SUBCUTANEOUS EVERY 8 HOURS SCHEDULED
Status: DISCONTINUED | OUTPATIENT
Start: 2025-07-16 | End: 2025-07-22 | Stop reason: HOSPADM

## 2025-07-16 RX ORDER — LISINOPRIL 20 MG/1
40 TABLET ORAL DAILY
Status: DISCONTINUED | OUTPATIENT
Start: 2025-07-16 | End: 2025-07-17

## 2025-07-16 RX ORDER — URSODIOL 300 MG/1
300 CAPSULE ORAL 2 TIMES DAILY
Status: DISCONTINUED | OUTPATIENT
Start: 2025-07-16 | End: 2025-07-22 | Stop reason: HOSPADM

## 2025-07-16 RX ORDER — POTASSIUM CHLORIDE 14.9 MG/ML
20 INJECTION INTRAVENOUS ONCE
Status: COMPLETED | OUTPATIENT
Start: 2025-07-16 | End: 2025-07-16

## 2025-07-16 RX ORDER — PANTOPRAZOLE SODIUM 40 MG/1
40 TABLET, DELAYED RELEASE ORAL
Status: DISCONTINUED | OUTPATIENT
Start: 2025-07-17 | End: 2025-07-22 | Stop reason: HOSPADM

## 2025-07-16 RX ORDER — ONDANSETRON 2 MG/ML
4 INJECTION INTRAMUSCULAR; INTRAVENOUS ONCE
Status: COMPLETED | OUTPATIENT
Start: 2025-07-16 | End: 2025-07-16

## 2025-07-16 RX ORDER — POLYETHYLENE GLYCOL 3350 17 G/17G
17 POWDER, FOR SOLUTION ORAL DAILY
Status: DISCONTINUED | OUTPATIENT
Start: 2025-07-16 | End: 2025-07-22 | Stop reason: HOSPADM

## 2025-07-16 RX ORDER — DAPSONE 25 MG/1
50 TABLET ORAL DAILY
Status: DISCONTINUED | OUTPATIENT
Start: 2025-07-16 | End: 2025-07-22 | Stop reason: HOSPADM

## 2025-07-16 RX ORDER — POTASSIUM CHLORIDE 1500 MG/1
40 TABLET, EXTENDED RELEASE ORAL DAILY
Status: DISCONTINUED | OUTPATIENT
Start: 2025-07-16 | End: 2025-07-17

## 2025-07-16 RX ORDER — FOLIC ACID 1 MG/1
1 TABLET ORAL DAILY
Status: DISCONTINUED | OUTPATIENT
Start: 2025-07-16 | End: 2025-07-22 | Stop reason: HOSPADM

## 2025-07-16 RX ORDER — LIDOCAINE 50 MG/G
1 PATCH TOPICAL DAILY
Status: DISCONTINUED | OUTPATIENT
Start: 2025-07-16 | End: 2025-07-19 | Stop reason: SDUPTHER

## 2025-07-16 RX ORDER — KETOROLAC TROMETHAMINE 30 MG/ML
15 INJECTION, SOLUTION INTRAMUSCULAR; INTRAVENOUS ONCE
Status: COMPLETED | OUTPATIENT
Start: 2025-07-16 | End: 2025-07-16

## 2025-07-16 RX ORDER — CYCLOBENZAPRINE HCL 10 MG
10 TABLET ORAL 3 TIMES DAILY PRN
Status: DISCONTINUED | OUTPATIENT
Start: 2025-07-16 | End: 2025-07-22 | Stop reason: HOSPADM

## 2025-07-16 RX ORDER — ALBUMIN (HUMAN) 12.5 G/50ML
25 SOLUTION INTRAVENOUS ONCE
Status: COMPLETED | OUTPATIENT
Start: 2025-07-16 | End: 2025-07-17

## 2025-07-16 RX ORDER — FENTANYL CITRATE 50 UG/ML
50 INJECTION, SOLUTION INTRAMUSCULAR; INTRAVENOUS ONCE
Refills: 0 | Status: COMPLETED | OUTPATIENT
Start: 2025-07-16 | End: 2025-07-16

## 2025-07-16 RX ORDER — OXYCODONE HYDROCHLORIDE 10 MG/1
10 TABLET ORAL EVERY 6 HOURS PRN
Status: DISCONTINUED | OUTPATIENT
Start: 2025-07-16 | End: 2025-07-22 | Stop reason: HOSPADM

## 2025-07-16 RX ADMIN — FENTANYL CITRATE 50 MCG: 50 INJECTION INTRAMUSCULAR; INTRAVENOUS at 15:02

## 2025-07-16 RX ADMIN — KETOROLAC TROMETHAMINE 15 MG: 30 INJECTION, SOLUTION INTRAMUSCULAR; INTRAVENOUS at 11:23

## 2025-07-16 RX ADMIN — IOHEXOL 100 ML: 350 INJECTION, SOLUTION INTRAVENOUS at 11:44

## 2025-07-16 RX ADMIN — POTASSIUM CHLORIDE 20 MEQ: 14.9 INJECTION, SOLUTION INTRAVENOUS at 20:58

## 2025-07-16 RX ADMIN — POTASSIUM CHLORIDE 40 MEQ: 1.5 SOLUTION ORAL at 11:31

## 2025-07-16 RX ADMIN — URSODIOL 300 MG: 300 CAPSULE ORAL at 17:36

## 2025-07-16 RX ADMIN — LISINOPRIL 40 MG: 20 TABLET ORAL at 17:38

## 2025-07-16 RX ADMIN — HEPARIN SODIUM 5000 UNITS: 5000 INJECTION INTRAVENOUS; SUBCUTANEOUS at 21:28

## 2025-07-16 RX ADMIN — POLYETHYLENE GLYCOL 3350 17 G: 17 POWDER, FOR SOLUTION ORAL at 17:02

## 2025-07-16 RX ADMIN — OXYCODONE HYDROCHLORIDE 10 MG: 10 TABLET ORAL at 21:20

## 2025-07-16 RX ADMIN — TRIMETHOBENZAMIDE HYDROCHLORIDE 200 MG: 100 INJECTION INTRAMUSCULAR at 16:56

## 2025-07-16 RX ADMIN — ALBUMIN (HUMAN) 25 G: 0.25 INJECTION, SOLUTION INTRAVENOUS at 23:27

## 2025-07-16 RX ADMIN — POTASSIUM CHLORIDE 40 MEQ: 20 SOLUTION ORAL at 21:25

## 2025-07-16 RX ADMIN — POTASSIUM CHLORIDE 40 MEQ: 1500 TABLET, EXTENDED RELEASE ORAL at 17:02

## 2025-07-16 RX ADMIN — SPIRONOLACTONE 25 MG: 25 TABLET ORAL at 17:38

## 2025-07-16 RX ADMIN — POTASSIUM CHLORIDE 20 MEQ: 14.9 INJECTION, SOLUTION INTRAVENOUS at 11:37

## 2025-07-16 RX ADMIN — DAPSONE 50 MG: 25 TABLET ORAL at 17:09

## 2025-07-16 RX ADMIN — ONDANSETRON 4 MG: 2 INJECTION INTRAMUSCULAR; INTRAVENOUS at 10:09

## 2025-07-16 RX ADMIN — TORSEMIDE 20 MG: 20 TABLET ORAL at 17:38

## 2025-07-16 RX ADMIN — LACTULOSE 30 G: 10 SOLUTION ORAL at 21:19

## 2025-07-16 RX ADMIN — FOLIC ACID 1 MG: 1 TABLET ORAL at 17:02

## 2025-07-16 RX ADMIN — LACTULOSE 30 G: 10 SOLUTION ORAL at 17:36

## 2025-07-16 RX ADMIN — HEPARIN SODIUM 5000 UNITS: 5000 INJECTION INTRAVENOUS; SUBCUTANEOUS at 17:11

## 2025-07-16 RX ADMIN — POTASSIUM CHLORIDE 20 MEQ: 14.9 INJECTION, SOLUTION INTRAVENOUS at 23:05

## 2025-07-16 RX ADMIN — RIFAXIMIN 550 MG: 550 TABLET ORAL at 21:19

## 2025-07-16 RX ADMIN — FENTANYL CITRATE 25 MCG: 50 INJECTION INTRAMUSCULAR; INTRAVENOUS at 10:09

## 2025-07-16 RX ADMIN — AMITRIPTYLINE HYDROCHLORIDE 50 MG: 25 TABLET, FILM COATED ORAL at 21:19

## 2025-07-16 NOTE — ASSESSMENT & PLAN NOTE
Secondary to fall, imaging reviewed  Continue pain management, embolization, can use finger splint  Follow orthopedic recommendation

## 2025-07-16 NOTE — CONSULTS
e-Consult (IPC)     Inpatient consult to Neurosurgery  Consult performed by: MAGDA Montilla  Consult ordered by: Ruslan Keyes MD           Contacted by Ruslan Keyes MD.    Bailey Olsen 54 y.o. female MRN: [id number]  Unit/Bed#: ED 05 Encounter: [id number]    Reason for Consult    Per provider report, patient presents to SLOW ED with c/o fall at home. Reportedly slipped on wet floor yesterday. Noted on workup with acute T12 fracture and chronic L1 fx, as well as right middle phalanx fx.    She is well known to our service as she is s/p IR kyphoplasty of L1 10/15/24 with Dr. Davis. Available past medical history,social history, surgical history, medication list, drug allergies and review of systems were reviewed.    /59   Pulse 92   Temp 99.7 °F (37.6 °C) (Temporal)   Resp 19   Wt 117 kg (258 lb 9.6 oz)   SpO2 96%   BMI 41.74 kg/m²      Clinical exam per provider report, multiple areas of ecchymosis. No motor or sensory changes..    Imaging personally reviewed. Age-indeterminate SEP T12 on CT CAP.    Assessment and Recommendations    1. Continue to monitor neuro exam closely.  2. Upright lumbar x-rays for baseline.  3. TLSO back pack brace when upright > 45 degrees and OOB.  4. No bending, twisting, lifting > 10 lbs.  5. Mobilize as tolerated.  6. She was encouraged to obtain DXA scan at her last appointment with Dr. Davis - this does not appear to have been done. Would encourage osteoporosis workup.  7. DVT ppx: ok for pharmacologic DVT ppx.  8. Neurosurgery will review imaging once completed.  9. Likely plan for outpatient follow up in 2 weeks with x-rays.    All questions answered. Provider is in agreement with the course of action. 11-20 minutes, >50% of the total time devoted to medical consultative verbal/EMR discussion between providers. Written report will be generated in the EMR.

## 2025-07-16 NOTE — ASSESSMENT & PLAN NOTE
Came after fall  Shows left periorbital bruise, multiple bruises on right lateral chest, right buttock area, left knee  Patient also has T12 fracture, right third finger fracture  Follow-up PT OT recommendation  PainManagement, case management

## 2025-07-16 NOTE — ED PROVIDER NOTES
Time reflects when diagnosis was documented in both MDM as applicable and the Disposition within this note       Time User Action Codes Description Comment    7/16/2025 11:24 AM Olvin Rivero Add [W19.XXXA] Fall, initial encounter     7/16/2025 11:45 AM Olvin Rivero Add [R51.9] Headache     7/16/2025 11:45 AM RomanNasim watsony Add [E87.6] Hypokalemia     7/16/2025 12:45 PM Romanwalter Olvin Jordan [R94.31] QT prolongation           ED Disposition       ED Disposition   Admit    Condition   Stable    Date/Time   Wed Jul 16, 2025 11:46 AM    Comment                  Assessment & Plan       Medical Decision Making  Patient presented to the emergency department and a MSE was performed. The patient was evaluated for complaint related to acute traumatic injury.  Patient is potentially at risk for, but not limited to, acute head injury including intracranial hemorrhage, subdural or epidural hematoma, cervical spine injury, other spine injury, chest trauma such as pneumothorax, pulmonary contusion, or cardiac contusion, abdominal injury such as liver hematoma, spleen hematoma, kidney hematoma, or other musculoskeletal injury. Several of these diagnoses have been evaluated and ruled out by history and physical.  As needed, patient will be further evaluated with laboratory and imaging studies.  Higher level diagnostics, such as CT imaging or ultrasound, may also be required.  Please see work-up portion of the note for further evaluation of patient's risk.  Socioeconomic factors were also considered as part of the decision-making process.  Unless otherwise stated in the chart or patient is admitted as elsewhere documented, any previously prescribed medications will be maintained.    Additionally patient was evaluated for contusion to her right middle finger and injury to her left knee which may be indicative of a sprain, strain, fracture      Problems Addressed:  Fall, initial encounter: acute illness or injury  Headache: acute  illness or injury  Hypokalemia: chronic illness or injury with exacerbation, progression, or side effects of treatment  QT prolongation: complicated acute illness or injury with systemic symptoms that poses a threat to life or bodily functions    Amount and/or Complexity of Data Reviewed  Labs: ordered. Decision-making details documented in ED Course.  Radiology: ordered and independent interpretation performed.    Risk  Prescription drug management.  Decision regarding hospitalization.  Risk Details: Patient presented to the emergency department and a MSE was performed. The patient was evaluated and diagnosed with acute exacerbation of hypokalemia and fall.This is an acute exacerbation of a chronic disease process that will require additional planned work-up and treatment in a hospitalized setting. As may have been required as part of this evaluation, clinical laboratory test, radiology imaging and medical testing (I.e. EKG) were ordered as necessitated by the patient's presentation. I independently reviewed these studies, imaging and testing. This patient's case is considered to be a considerable risk secondary to the above listed disease process and poses a threat to the patient's well-being and baseline function. Further in-patient diagnostic testing and management, which may include the administration of parenteral medications, is required.              ED Course as of 07/16/25 1301   Wed Jul 16, 2025   1037 Laboratory values are thus far consistent with patient's chronic disease process.   1123 Potassium(!!): 2.1  Patient with history of chronic hypokalemia.  Will replace.  Will plan for admission.   1144 EKG shows prolonged QTc consistent with hypokalemia   1145 Previous QTc is in the mid 400s.   1227 CT of the head is negative for acute process.   1244 Questionable new T12 fracture, age-indeterminate.  No acute traumatic injury.  Multiple chronic findings.       Medications   potassium chloride 20 mEq IVPB  (premix) (20 mEq Intravenous New Bag 7/16/25 1137)   ondansetron (ZOFRAN) injection 4 mg (4 mg Intravenous Given 7/16/25 1009)   fentaNYL injection 25 mcg (25 mcg Intravenous Given 7/16/25 1009)   ketorolac (TORADOL) injection 15 mg (15 mg Intravenous Given 7/16/25 1123)   potassium chloride oral solution 40 mEq (40 mEq Oral Given 7/16/25 1131)   iohexol (OMNIPAQUE) 350 MG/ML injection (MULTI-DOSE) 100 mL (100 mL Intravenous Given 7/16/25 1144)       ED Risk Strat Scores                    No data recorded        SBIRT 22yo+      Flowsheet Row Most Recent Value   Initial Alcohol Screen: US AUDIT-C     1. How often do you have a drink containing alcohol? 0 Filed at: 07/16/2025 0952   2. How many drinks containing alcohol do you have on a typical day you are drinking?  0 Filed at: 07/16/2025 0952   3b. FEMALE Any Age, or MALE 65+: How often do you have 4 or more drinks on one occassion? 0 Filed at: 07/16/2025 0952   Audit-C Score 0 Filed at: 07/16/2025 0952   BERNIE: How many times in the past year have you...    Used an illegal drug or used a prescription medication for non-medical reasons? Never Filed at: 07/16/2025 0952                            History of Present Illness       Chief Complaint   Patient presents with    Headache     States she had multiple falls in the past due ot mobility issues and uses a walker to ambulate. States she went out ot the back yesterday and did nto see the floor was wet resulting in her slipping and falling. Patient has a ecchymotic area left periorbital and left knee. Called her PCP this am and was told to get evaluated for a head injury.       Past Medical History[1]   Past Surgical History[2]   Family History[3]   Social History[4]   E-Cigarette/Vaping    E-Cigarette Use Never User       E-Cigarette/Vaping Substances    Nicotine No     THC No     CBD No     Flavoring No     Other No     Unknown No       I have reviewed and agree with the history as documented.     54-year-old  female with history of cirrhosis of the liver, neuroendocrine tumor, CHF, and hypertension who fell at a bank yesterday.  She was ambulating with her walker and reports that she slipped on wet dario.  Patient reports that she struck the left side of her head and also landed down on her back striking her right rib cage.  She is also complaining of pain to her right middle finger as well as her left knee.  She had no loss of consciousness.  She denies any neck pain.      History provided by:  Patient  Headache  Associated symptoms: back pain    Associated symptoms: no diarrhea, no fever, no nausea and no vomiting        Review of Systems   Constitutional:  Negative for fever.   Respiratory:  Negative for shortness of breath and wheezing.    Cardiovascular:  Negative for chest pain.   Gastrointestinal:  Negative for diarrhea, nausea and vomiting.   Musculoskeletal:  Positive for arthralgias, back pain and joint swelling.   Skin:  Positive for wound.   Neurological:  Positive for headaches.           Objective       ED Triage Vitals [07/16/25 0949]   Temperature Pulse Blood Pressure Respirations SpO2 Patient Position - Orthostatic VS   99.7 °F (37.6 °C) 99 134/66 16 96 % Lying      Temp Source Heart Rate Source BP Location FiO2 (%) Pain Score    Temporal Monitor Left arm -- 8      Vitals      Date and Time Temp Pulse SpO2 Resp BP Pain Score FACES Pain Rating User   07/16/25 1215 -- 92 94 % 21 129/68 -- -- MB   07/16/25 1155 -- -- -- -- -- 5 -- MB   07/16/25 1123 -- -- -- -- -- 7 -- MB   07/16/25 1039 -- -- -- -- -- 6 -- MB   07/16/25 1009 -- -- -- -- -- 8 -- MB   07/16/25 1000 -- 94 96 % -- 134/66 -- -- MB   07/16/25 0949 99.7 °F (37.6 °C) 99 96 % 16 134/66 8 -- BF            Physical Exam  Vitals (BMI 40-45) and nursing note reviewed.   Constitutional:       General: She is in acute distress.      Appearance: She is normal weight. She is not ill-appearing or toxic-appearing.   HENT:      Head: Normocephalic.  Contusion present.        Right Ear: External ear normal.      Left Ear: External ear normal.      Nose: Nose normal.      Mouth/Throat:      Mouth: Mucous membranes are moist.   Pulmonary:      Effort: Pulmonary effort is normal. No respiratory distress.   Chest:      Chest wall: Tenderness present. No swelling or edema.     Abdominal:      General: Abdomen is flat. There is no distension.     Musculoskeletal:         General: Swelling, tenderness and signs of injury present.        Hands:       Left knee: Ecchymosis present. No erythema. Tenderness present.     Skin:     Coloration: Skin is not pale.      Findings: Bruising present.     Neurological:      General: No focal deficit present.      Mental Status: She is alert.     Psychiatric:         Mood and Affect: Mood normal.         Results Reviewed       Procedure Component Value Units Date/Time    Comprehensive metabolic panel [[id number]]  (Abnormal) Collected: 07/16/25 1009    Lab Status: Final result Specimen: Blood from Arm, Left Updated: 07/16/25 1123     Sodium 139 mmol/L      Potassium 2.1 mmol/L      Chloride 89 mmol/L      CO2 42 mmol/L      ANION GAP 8 mmol/L      BUN 10 mg/dL      Creatinine 0.98 mg/dL      Glucose 115 mg/dL      Calcium 7.4 mg/dL      Corrected Calcium 8.6 mg/dL      AST 72 U/L      ALT 32 U/L      Alkaline Phosphatase 212 U/L      Total Protein 6.4 g/dL      Albumin 2.5 g/dL      Total Bilirubin 3.06 mg/dL      eGFR 65 ml/min/1.73sq m     Narrative:      test repeated and verified  National Kidney Disease Foundation guidelines for Chronic Kidney Disease (CKD):     Stage 1 with normal or high GFR (GFR > 90 mL/min/1.73 square meters)    Stage 2 Mild CKD (GFR = 60-89 mL/min/1.73 square meters)    Stage 3A Moderate CKD (GFR = 45-59 mL/min/1.73 square meters)    Stage 3B Moderate CKD (GFR = 30-44 mL/min/1.73 square meters)    Stage 4 Severe CKD (GFR = 15-29 mL/min/1.73 square meters)    Stage 5 End Stage CKD (GFR <15 mL/min/1.73 square  meters)  Note: GFR calculation is accurate only with a steady state creatinine    Protime-INR [[id number]]  (Abnormal) Collected: 07/16/25 1009    Lab Status: Final result Specimen: Blood from Arm, Left Updated: 07/16/25 1032     Protime 23.1 seconds      INR 1.95    Narrative:      INR Therapeutic Range    Indication                                             INR Range      Atrial Fibrillation                                               2.0-3.0  Hypercoagulable State                                    2.0.2.3  Left Ventricular Asist Device                            2.0-3.0  Mechanical Heart Valve                                  -    Aortic(with afib, MI, embolism, HF, LA enlargement,    and/or coagulopathy)                                     2.0-3.0 (2.5-3.5)     Mitral                                                             2.5-3.5  Prosthetic/Bioprosthetic Heart Valve               2.0-3.0  Venous thromboembolism (VTE: VT, PE        2.0-3.0    APTT [[id number]]  (Abnormal) Collected: 07/16/25 1009    Lab Status: Final result Specimen: Blood from Arm, Left Updated: 07/16/25 1032     PTT 38 seconds     CBC and differential [[id number]]  (Abnormal) Collected: 07/16/25 1009    Lab Status: Final result Specimen: Blood from Arm, Left Updated: 07/16/25 1022     WBC 3.05 Thousand/uL      RBC 2.84 Million/uL      Hemoglobin 8.6 g/dL      Hematocrit 27.5 %      MCV 97 fL      MCH 30.3 pg      MCHC 31.3 g/dL      RDW 17.9 %      MPV 10.8 fL      Platelets 68 Thousands/uL      nRBC 0 /100 WBCs      Segmented % 75 %      Immature Grans % 0 %      Lymphocytes % 18 %      Monocytes % 6 %      Eosinophils Relative 0 %      Basophils Relative 1 %      Absolute Neutrophils 2.26 Thousands/µL      Absolute Immature Grans 0.01 Thousand/uL      Absolute Lymphocytes 0.56 Thousands/µL      Absolute Monocytes 0.19 Thousand/µL      Eosinophils Absolute 0.01 Thousand/µL      Basophils Absolute 0.02 Thousands/µL             CT head  without contrast   Final Interpretation by Eduardo Irvin MD (07/16 1214)      No acute intracranial abnormality.                  Workstation performed: [id number]         CT chest abdomen pelvis w contrast   Final Interpretation by Eduardo Irvin MD (07/16 1231)      1.  Age-indeterminate superior endplate compression fracture at T12, not present in May 2025 and possibly acute. Clinical correlation recommended.      2.  No acute traumatic injury to the chest, abdomen or pelvis viscera.      3.  Cirrhosis and portal hypertension.      4.  New juxtapleural nodule in the medial left lower lobe measuring 3 mm with additional 3 mm nodules in the left upper and lower lobes, stable from recent studies though not present in 2024. Based on current Fleischner Society 2017 Guidelines on    incidental pulmonary nodule, no routine follow-up is needed if the patient is low risk. If the patient is high risk, optional follow-up chest CT at 12 months can be considered.      5.  Stable groundglass density in the left upper lobe, possibly postinflammatory scarring.      The study was marked in EPIC for immediate notification.      Computerized Assisted Algorithm (CAA) may have aided analysis of applicable images.      Workstation performed: [id number]         XR finger third digit-middle RIGHT   ED Interpretation by Olvin Rivero DO (07/16 1038)   No acute fracture.      XR knee 4+ vw left injury   ED Interpretation by Olvin Rivero DO (07/16 1038)   Mild degenerative changes without acute fracture.          Procedures    ED Medication and Procedure Management   Prior to Admission Medications   Prescriptions Last Dose Informant Patient Reported? Taking?   amitriptyline (ELAVIL) 50 mg tablet   No No   Sig: Take 1 tablet (50 mg total) by mouth daily at bedtime   cyclobenzaprine (FLEXERIL) 10 mg tablet  Pharmacy (Specify) Yes No   Sig: Take 10 mg by mouth as needed in the morning and 10 mg as needed at noon and 10 mg as  needed in the evening for muscle spasms.   dapsone 25 mg tablet   Yes No   Sig: Take 50 mg by mouth in the morning.   folic acid (FOLVITE) 1 mg tablet   Yes No   Sig: Take 1 mg by mouth daily   lactulose (CHRONULAC) 10 g/15 mL solution   No No   Sig: Take 45 mL (30 g total) by mouth 4 (four) times a day   lidocaine (LIDODERM) 5 %   Yes No   Sig: Apply 1 patch topically as needed (daily as needed) Remove & Discard patch within 12 hours or as directed by MD   linaCLOtide 145 MCG CAPS   No No   Sig: Take 1 capsule (145 mcg total) by mouth in the morning   metoprolol tartrate (LOPRESSOR) 25 mg tablet   Yes No   Sig: Take 25 mg by mouth every 12 (twelve) hours   pantoprazole (PROTONIX) 40 mg tablet   No No   Sig: Take 1 tablet (40 mg total) by mouth daily in the early morning   polyethylene glycol (MIRALAX) 17 g packet   No No   Sig: Take 17 g by mouth daily   potassium chloride (Klor-Con M20) 20 mEq tablet   Yes No   Sig: Take 20 mEq by mouth 2 (two) times a day   rifaximin (XIFAXAN) 550 mg tablet   Yes No   Sig: Take 550 mg by mouth every 12 (twelve) hours   spironolactone (ALDACTONE) 25 mg tablet   No No   Sig: Take 1 tablet (25 mg total) by mouth daily   torsemide (DEMADEX) 20 mg tablet   No No   Sig: Take 1 tablet (20 mg total) by mouth daily   ursodiol (ACTIGALL) 300 mg capsule   Yes No   Sig: Take 300 mg by mouth in the morning and 300 mg in the evening.      Facility-Administered Medications: None     Patient's Medications   Discharge Prescriptions    No medications on file     No discharge procedures on file.  ED SEPSIS DOCUMENTATION   Time reflects when diagnosis was documented in both MDM as applicable and the Disposition within this note       Time User Action Codes Description Comment    7/16/2025 11:24 AM Olvin Rivero [W19.XXXA] Fall, initial encounter     7/16/2025 11:45 AM Olvin Rivero [R51.9] Headache     7/16/2025 11:45 AM Olvin Rivero [E87.6] Hypokalemia     7/16/2025 12:45 PM  Olvin Rivero Add [R94.31] QT prolongation                    [1]   Past Medical History:  Diagnosis Date    Back pain     Carcinoid tumor     neuroendocrine    CHF (congestive heart failure) (HCC)     Cirrhosis of liver (HCC)     Hypertension     Sarcoidosis    [2]   Past Surgical History:  Procedure Laterality Date    CHOLECYSTECTOMY      GASTRIC BYPASS      HERNIA REPAIR      IR KYPHOPLASTY/VERTEBROPLASTY  10/15/2024   [3]   Family History  Problem Relation Name Age of Onset    Hypertension Father      Heart attack Father      No Known Problems Sister      No Known Problems Brother      No Known Problems Brother      No Known Problems Brother      No Known Problems Brother      No Known Problems Brother      No Known Problems Daughter     [4]   Social History  Tobacco Use    Smoking status: Never    Smokeless tobacco: Never   Vaping Use    Vaping status: Never Used   Substance Use Topics    Alcohol use: Never    Drug use: Never        Olvin Rivero DO  07/16/25 1301

## 2025-07-16 NOTE — ASSESSMENT & PLAN NOTE
There is a new mild superior endplate compression fracture at T12 compared to May 2025. Status post kyphoplasty at L1. There is a healing fracture of the right anterior fourth rib.     Has recent history of fall  Complaining of lower back pain  Continue pain management  No neurodeficit  Follow-up PT OT, neurosurgery recommended

## 2025-07-16 NOTE — PLAN OF CARE
Problem: PAIN - ADULT  Goal: Verbalizes/displays adequate comfort level or baseline comfort level  Description: Interventions:  - Encourage patient to monitor pain and request assistance  - Assess pain using appropriate pain scale  - Administer analgesics as ordered based on type and severity of pain and evaluate response  - Implement non-pharmacological measures as appropriate and evaluate response  - Consider cultural and social influences on pain and pain management  - Notify physician/advanced practitioner if interventions unsuccessful or patient reports new pain  - Educate patient/family on pain management process including their role and importance of  reporting pain   - Provide non-pharmacologic/complimentary pain relief interventions  Outcome: Progressing     Problem: INFECTION - ADULT  Goal: Absence or prevention of progression during hospitalization  Description: INTERVENTIONS:  - Assess and monitor for signs and symptoms of infection  - Monitor lab/diagnostic results  - Monitor all insertion sites, i.e. indwelling lines, tubes, and drains  - Monitor endotracheal if appropriate and nasal secretions for changes in amount and color  - Spencer appropriate cooling/warming therapies per order  - Administer medications as ordered  - Instruct and encourage patient and family to use good hand hygiene technique  - Identify and instruct in appropriate isolation precautions for identified infection/condition  Outcome: Progressing     Problem: SAFETY ADULT  Goal: Patient will remain free of falls  Description: INTERVENTIONS:  - Educate patient/family on patient safety including physical limitations  - Instruct patient to call for assistance with activity   - Consider consulting OT/PT to assist with strengthening/mobility based on AM PAC & JH-HLM score  - Consult OT/PT to assist with strengthening/mobility   - Keep Call bell within reach  - Keep bed low and locked with side rails adjusted as appropriate  - Keep  care items and personal belongings within reach  - Initiate and maintain comfort rounds  - Make Fall Risk Sign visible to staff  - Offer Toileting every 2 Hours, in advance of need  - Initiate/Maintain bed alarm  - Obtain necessary fall risk management equipment walker  - Apply yellow socks and bracelet for high fall risk patients  - Consider moving patient to room near nurses station  Outcome: Progressing  Goal: Maintain or return to baseline ADL function  Description: INTERVENTIONS:  -  Assess patient's ability to carry out ADLs; assess patient's baseline for ADL function and identify physical deficits which impact ability to perform ADLs (bathing, care of mouth/teeth, toileting, grooming, dressing, etc.)  - Assess/evaluate cause of self-care deficits   - Assess range of motion  - Assess patient's mobility; develop plan if impaired  - Assess patient's need for assistive devices and provide as appropriate  - Encourage maximum independence but intervene and supervise when necessary  - Involve family in performance of ADLs  - Assess for home care needs following discharge   - Consider OT consult to assist with ADL evaluation and planning for discharge  - Provide patient education as appropriate  - Monitor functional capacity and physical performance, use of AM PAC & JH-HLM   - Monitor gait, balance and fatigue with ambulation    Outcome: Progressing  Goal: Maintains/Returns to pre admission functional level  Description: INTERVENTIONS:  - Perform AM-PAC 6 Click Basic Mobility/ Daily Activity assessment daily.  - Set and communicate daily mobility goal to care team and patient/family/caregiver.   - Collaborate with rehabilitation services on mobility goals if consulted  - Perform Range of Motion 3 times a day.  - Reposition patient every 2 hours.  - Dangle patient 3 times a day  - Stand patient 3 times a day  - Ambulate patient 3 times a day  - Out of bed to chair 3 times a day   - Out of bed for meals 3 times a  day  - Out of bed for toileting  - Record patient progress and toleration of activity level   Outcome: Progressing     Problem: DISCHARGE PLANNING  Goal: Discharge to home or other facility with appropriate resources  Description: INTERVENTIONS:  - Identify barriers to discharge w/patient and caregiver  - Arrange for needed discharge resources and transportation as appropriate  - Identify discharge learning needs (meds, wound care, etc.)  - Arrange for interpretive services to assist at discharge as needed  - Refer to Case Management Department for coordinating discharge planning if the patient needs post-hospital services based on physician/advanced practitioner order or complex needs related to functional status, cognitive ability, or social support system  Outcome: Progressing     Problem: Knowledge Deficit  Goal: Patient/family/caregiver demonstrates understanding of disease process, treatment plan, medications, and discharge instructions  Description: Complete learning assessment and assess knowledge base.  Interventions:  - Provide teaching at level of understanding  - Provide teaching via preferred learning methods  Outcome: Progressing     Problem: METABOLIC, FLUID AND ELECTROLYTES - ADULT  Goal: Electrolytes maintained within normal limits  Description: INTERVENTIONS:  - Monitor labs and assess patient for signs and symptoms of electrolyte imbalances  - Administer electrolyte replacement as ordered  - Monitor response to electrolyte replacements, including repeat lab results as appropriate  - Instruct patient on fluid and nutrition as appropriate  Outcome: Progressing  Goal: Fluid balance maintained  Description: INTERVENTIONS:  - Monitor labs   - Monitor I/O and WT  - Instruct patient on fluid and nutrition as appropriate  - Assess for signs & symptoms of volume excess or deficit  Outcome: Progressing  Goal: Glucose maintained within target range  Description: INTERVENTIONS:  - Monitor Blood Glucose as  ordered  - Assess for signs and symptoms of hyperglycemia and hypoglycemia  - Administer ordered medications to maintain glucose within target range  - Assess nutritional intake and initiate nutrition service referral as needed  Outcome: Progressing

## 2025-07-16 NOTE — H&P
H&P - Hospitalist   Name: Bailey Olsen 54 y.o. female I MRN: [id number]  Unit/Bed#: ED 05 I Date of Admission: 7/16/2025   Date of Service: 7/16/2025 I Hospital Day: 0     Assessment & Plan  Hypokalemia  Patient has chronic history of hypokalemia, does take potassium supplement  With potassium 2.1, EKG also shows prolonged QT  Monitor on telemetry, continue potassium supplement.  Fall  Came after fall  Shows left periorbital bruise, multiple bruises on right lateral chest, right buttock area, left knee  Patient also has T12 fracture, right third finger fracture  Follow-up PT OT recommendation  PainManagement, case management  Closed fracture of phalanx of right middle finger  Secondary to fall, imaging reviewed  Continue pain management, embolization, can use finger splint  Follow orthopedic recommendation  Closed fracture of twelfth thoracic vertebra (HCC)  There is a new mild superior endplate compression fracture at T12 compared to May 2025. Status post kyphoplasty at L1. There is a healing fracture of the right anterior fourth rib.     Has recent history of fall  Complaining of lower back pain  Continue pain management  No neurodeficit  Follow-up PT OT, neurosurgery recommended  Liver cirrhosis secondary to HYATT (nonalcoholic steatohepatitis) (HCC)  Continue conservative management with home medications.  Pancytopenia (HCC)  Secondary to liver cirrhosis, continue to monitor, no active bleeding noted  Morbid obesity with BMI of 40.0-44.9, adult (HCC)  Lifestyle modification.  Chronic, continuous use of opioids  Will use narcotics, continue to monitor.      VTE Pharmacologic Prophylaxis: VTE Score: 5 High Risk (Score >/= 5) - Pharmacological DVT Prophylaxis Ordered: heparin. Sequential Compression Devices Ordered.  Code Status: Level 1 - Full Code per patient  Discussion with family: With patient.     Anticipated Length of Stay: Patient will be admitted on an observation basis with an anticipated length of stay  of less than 2 midnights secondary to fall, finger and thoracic 12 fracture, pending orthopedic evaluation, PT OT recommendation.    History of Present Illness   Chief Complaint: Fall    Bailey Olsen is a 54 y.o. female with a PMH of morbid obesity, liver cirrhosis, who presents with fall at home..States she had multiple falls in the past due ot mobility issues and uses a walker to ambulate. States she went out ot the back yesterday and did nto see the floor was wet resulting in her slipping and falling. Patient has a ecchymotic area left periorbital and left knee. Called her PCP this am and was told to get evaluated for a head injury.     Review of Systems   Constitutional:  Positive for activity change. Negative for diaphoresis, fatigue and fever.   Respiratory:  Negative for apnea, shortness of breath, wheezing and stridor.    Cardiovascular:  Negative for chest pain, palpitations and leg swelling.   Gastrointestinal:  Negative for blood in stool, constipation, nausea and rectal pain.   Genitourinary:  Negative for difficulty urinating, dyspareunia and dysuria.   Musculoskeletal:  Positive for arthralgias and back pain.   Skin:  Positive for color change. Negative for wound.   Neurological:  Negative for dizziness, facial asymmetry and headaches.   Psychiatric/Behavioral:  Negative for agitation, behavioral problems and confusion.    All other systems reviewed and are negative.      Historical Information   Past Medical History[1]  Past Surgical History[2]  Social History[3]  E-Cigarette/Vaping    E-Cigarette Use Never User      E-Cigarette/Vaping Substances    Nicotine No     THC No     CBD No     Flavoring No     Other No     Unknown No      Family History[4]  Social History:  Marital Status: /Civil Union   Occupation: Unknown  Patient Pre-hospital Living Situation: Home  Patient Pre-hospital Level of Mobility: walks with walker  Patient Pre-hospital Diet Restrictions: Cardiac/diabetic  diet    Meds/Allergies   I have reviewed home medications with patient personally.  Prior to Admission medications    Medication Sig Start Date End Date Taking? Authorizing Provider   amitriptyline (ELAVIL) 50 mg tablet Take 1 tablet (50 mg total) by mouth daily at bedtime 3/1/25  Yes Mar Oliva MD   cyclobenzaprine (FLEXERIL) 10 mg tablet Take 10 mg by mouth as needed in the morning and 10 mg as needed at noon and 10 mg as needed in the evening for muscle spasms.   Yes Historical Provider, MD   dapsone 25 mg tablet Take 50 mg by mouth in the morning.   Yes Historical Provider, MD   folic acid (FOLVITE) 1 mg tablet Take 1 mg by mouth in the morning. 8/4/16 7/16/25 Yes Historical Provider, MD   lactulose (CHRONULAC) 10 g/15 mL solution Take 45 mL (30 g total) by mouth 4 (four) times a day 2/13/25  Yes Idalia Stuart MD   lidocaine (LIDODERM) 5 % Apply 1 patch topically as needed (daily as needed) Remove & Discard patch within 12 hours or as directed by MD   Yes Historical Provider, MD   linaCLOtide 145 MCG CAPS Take 1 capsule (145 mcg total) by mouth in the morning 6/17/25  Yes Laura Bell PA-C   lisinopril (ZESTRIL) 40 mg tablet Take 40 mg by mouth daily 7/14/25  Yes Historical Provider, MD   metoprolol tartrate (LOPRESSOR) 25 mg tablet Take 25 mg by mouth every 12 (twelve) hours   Yes Historical Provider, MD   oxyCODONE (ROXICODONE) 10 MG TABS Take 10 mg by mouth every 6 (six) hours as needed 7/14/25  Yes Historical Provider, MD   pantoprazole (PROTONIX) 40 mg tablet Take 1 tablet (40 mg total) by mouth daily in the early morning 5/28/25  Yes Idalia Stuart MD   polyethylene glycol (MIRALAX) 17 g packet Take 17 g by mouth daily 6/17/25  Yes Laura Bell PA-C   potassium chloride (Klor-Con M20) 20 mEq tablet Take 20 mEq by mouth 2 (two) times a day   Yes Historical Provider, MD   rifaximin (XIFAXAN) 550 mg tablet Take 550 mg by mouth every 12 (twelve) hours   Yes Historical Provider, MD   spironolactone  (ALDACTONE) 25 mg tablet Take 1 tablet (25 mg total) by mouth daily 2/13/25 7/16/25 Yes Idalia Stuart MD   torsemide (DEMADEX) 20 mg tablet Take 1 tablet (20 mg total) by mouth daily 5/29/25  Yes Idalia Stuart MD   ursodiol (ACTIGALL) 300 mg capsule Take 300 mg by mouth in the morning and 300 mg in the evening.   Yes Historical Provider, MD     Allergies   Allergen Reactions    Zolpidem GI Intolerance, Other (See Comments) and Hives     Other Reaction(s): Other      Blacks out    Blacks out    Tapentadol Sneezing and Other (See Comments)     Zoned out    Alprazolam Other (See Comments)     Other Reaction(s): Other      Feels zoned out    Feels zoned out    Amoxicillin-Pot Clavulanate Hives and Nausea Only     Amoxil fine    Dust Mite Extract Sneezing    Morphine Headache, Nausea Only and Vomiting    Tramadol GI Intolerance    Azelastine Rash    Azelastine Hcl Rash    Molds & Smuts Rash     Other Reaction(s): Unknown       Objective :  Temp:  [99.7 °F (37.6 °C)] 99.7 °F (37.6 °C)  HR:  [92-99] 92  BP: (112-136)/(59-71) 112/59  Resp:  [16-22] 19  SpO2:  [94 %-98 %] 96 %    Physical Exam  Vitals and nursing note reviewed. Exam conducted with a chaperone present.   Constitutional:       Appearance: Normal appearance. She is obese. She is not ill-appearing or diaphoretic.   HENT:      Mouth/Throat:      Mouth: Mucous membranes are moist.      Pharynx: No oropharyngeal exudate.     Eyes:      Extraocular Movements: Extraocular movements intact.      Conjunctiva/sclera: Conjunctivae normal.      Pupils: Pupils are equal, round, and reactive to light.       Cardiovascular:      Rate and Rhythm: Normal rate.      Heart sounds: Murmur heard.      No friction rub. No gallop.   Pulmonary:      Effort: No respiratory distress.      Breath sounds: No stridor. No wheezing or rhonchi.   Abdominal:      General: There is no distension.      Palpations: There is no mass.      Tenderness: There is no abdominal tenderness.      Hernia:  No hernia is present.     Musculoskeletal:      Right lower leg: No edema.      Left lower leg: No edema.      Comments: Multiple bruises, around left periorbital area, right lateral chest, right buttock area, left knee     Neurological:      Mental Status: She is alert and oriented to person, place, and time.      Cranial Nerves: No cranial nerve deficit.      Sensory: No sensory deficit.      Motor: No weakness.      Coordination: Coordination normal.         Lines/Drains:            Lab Results: I have reviewed the following results:  Results from last 7 days   Lab Units 07/16/25  1009   WBC Thousand/uL 3.05*   HEMOGLOBIN g/dL 8.6*   HEMATOCRIT % 27.5*   PLATELETS Thousands/uL 68*   SEGS PCT % 75   LYMPHO PCT % 18   MONO PCT % 6   EOS PCT % 0     Results from last 7 days   Lab Units 07/16/25  1009   SODIUM mmol/L 139   POTASSIUM mmol/L 2.1*   CHLORIDE mmol/L 89*   CO2 mmol/L 42*   BUN mg/dL 10   CREATININE mg/dL 0.98   ANION GAP mmol/L 8   CALCIUM mg/dL 7.4*   ALBUMIN g/dL 2.5*   TOTAL BILIRUBIN mg/dL 3.06*   ALK PHOS U/L 212*   ALT U/L 32   AST U/L 72*   GLUCOSE RANDOM mg/dL 115     Results from last 7 days   Lab Units 07/16/25  1009   INR  1.95*         Lab Results   Component Value Date    HGBA1C 5.1 06/16/2025    HGBA1C 4.7 02/26/2025    HGBA1C 5.9 (H) 02/27/2024           Imaging Results Review: I reviewed radiology reports from this admission including: Trauma workup.  Other Study Results Review: EKG was reviewed.     Administrative Statements       ** Please Note: This note has been constructed using a voice recognition system. **         [1]   Past Medical History:  Diagnosis Date    Back pain     Carcinoid tumor     neuroendocrine    CHF (congestive heart failure) (HCC)     Cirrhosis of liver (HCC)     Hypertension     Sarcoidosis    [2]   Past Surgical History:  Procedure Laterality Date    CHOLECYSTECTOMY      GASTRIC BYPASS      HERNIA REPAIR      IR KYPHOPLASTY/VERTEBROPLASTY  10/15/2024   [3]    Social History  Tobacco Use    Smoking status: Never    Smokeless tobacco: Never   Vaping Use    Vaping status: Never Used   Substance and Sexual Activity    Alcohol use: Never    Drug use: Never    Sexual activity: Not Currently   [4]   Family History  Problem Relation Name Age of Onset    Hypertension Father      Heart attack Father      No Known Problems Sister      No Known Problems Brother      No Known Problems Brother      No Known Problems Brother      No Known Problems Brother      No Known Problems Brother      No Known Problems Daughter

## 2025-07-16 NOTE — ASSESSMENT & PLAN NOTE
Patient has chronic history of hypokalemia, does take potassium supplement  With potassium 2.1, EKG also shows prolonged QT  Monitor on telemetry, continue potassium supplement.

## 2025-07-17 ENCOUNTER — TELEPHONE (OUTPATIENT)
Dept: NEUROSURGERY | Facility: CLINIC | Age: 55
End: 2025-07-17

## 2025-07-17 PROBLEM — N17.0 ACUTE RENAL FAILURE WITH TUBULAR NECROSIS (HCC): Status: ACTIVE | Noted: 2025-07-17

## 2025-07-17 PROBLEM — N17.0 ATN (ACUTE TUBULAR NECROSIS) (HCC): Status: ACTIVE | Noted: 2025-07-17

## 2025-07-17 LAB
ALBUMIN SERPL BCG-MCNC: 2.7 G/DL (ref 3.5–5)
ALP SERPL-CCNC: 171 U/L (ref 34–104)
ALT SERPL W P-5'-P-CCNC: 22 U/L (ref 7–52)
ANION GAP SERPL CALCULATED.3IONS-SCNC: 5 MMOL/L (ref 4–13)
ANION GAP SERPL CALCULATED.3IONS-SCNC: 9 MMOL/L (ref 4–13)
ANION GAP SERPL CALCULATED.3IONS-SCNC: 9 MMOL/L (ref 4–13)
AST SERPL W P-5'-P-CCNC: 76 U/L (ref 13–39)
BASOPHILS # BLD AUTO: 0.04 THOUSANDS/ÂΜL (ref 0–0.1)
BASOPHILS NFR BLD AUTO: 1 % (ref 0–1)
BILIRUB SERPL-MCNC: 2.36 MG/DL (ref 0.2–1)
BILIRUB UR QL STRIP: ABNORMAL
BUN SERPL-MCNC: 12 MG/DL (ref 5–25)
BUN SERPL-MCNC: 14 MG/DL (ref 5–25)
BUN SERPL-MCNC: 16 MG/DL (ref 5–25)
CALCIUM ALBUM COR SERPL-MCNC: 8.6 MG/DL (ref 8.3–10.1)
CALCIUM SERPL-MCNC: 7.3 MG/DL (ref 8.4–10.2)
CALCIUM SERPL-MCNC: 7.6 MG/DL (ref 8.4–10.2)
CALCIUM SERPL-MCNC: 8.5 MG/DL (ref 8.4–10.2)
CHLORIDE SERPL-SCNC: 93 MMOL/L (ref 96–108)
CHLORIDE SERPL-SCNC: 94 MMOL/L (ref 96–108)
CHLORIDE SERPL-SCNC: 95 MMOL/L (ref 96–108)
CLARITY UR: ABNORMAL
CO2 SERPL-SCNC: 33 MMOL/L (ref 21–32)
CO2 SERPL-SCNC: 35 MMOL/L (ref 21–32)
CO2 SERPL-SCNC: 38 MMOL/L (ref 21–32)
COLOR UR: ABNORMAL
CREAT SERPL-MCNC: 1.54 MG/DL (ref 0.6–1.3)
CREAT SERPL-MCNC: 1.76 MG/DL (ref 0.6–1.3)
CREAT SERPL-MCNC: 2.28 MG/DL (ref 0.6–1.3)
EOSINOPHIL # BLD AUTO: 0.06 THOUSAND/ÂΜL (ref 0–0.61)
EOSINOPHIL NFR BLD AUTO: 2 % (ref 0–6)
ERYTHROCYTE [DISTWIDTH] IN BLOOD BY AUTOMATED COUNT: 18.6 % (ref 11.6–15.1)
GFR SERPL CREATININE-BSD FRML MDRD: 23 ML/MIN/1.73SQ M
GFR SERPL CREATININE-BSD FRML MDRD: 32 ML/MIN/1.73SQ M
GFR SERPL CREATININE-BSD FRML MDRD: 37 ML/MIN/1.73SQ M
GLUCOSE SERPL-MCNC: 132 MG/DL (ref 65–140)
GLUCOSE SERPL-MCNC: 76 MG/DL (ref 65–140)
GLUCOSE SERPL-MCNC: 89 MG/DL (ref 65–140)
GLUCOSE UR STRIP-MCNC: NEGATIVE MG/DL
HCT VFR BLD AUTO: 26 % (ref 34.8–46.1)
HGB BLD-MCNC: 8.2 G/DL (ref 11.5–15.4)
HGB UR QL STRIP.AUTO: NEGATIVE
IMM GRANULOCYTES # BLD AUTO: 0 THOUSAND/UL (ref 0–0.2)
IMM GRANULOCYTES NFR BLD AUTO: 0 % (ref 0–2)
KETONES UR STRIP-MCNC: NEGATIVE MG/DL
LEUKOCYTE ESTERASE UR QL STRIP: NEGATIVE
LYMPHOCYTES # BLD AUTO: 0.96 THOUSANDS/ÂΜL (ref 0.6–4.47)
LYMPHOCYTES NFR BLD AUTO: 31 % (ref 14–44)
MAGNESIUM SERPL-MCNC: 1.8 MG/DL (ref 1.9–2.7)
MCH RBC QN AUTO: 30.5 PG (ref 26.8–34.3)
MCHC RBC AUTO-ENTMCNC: 31.5 G/DL (ref 31.4–37.4)
MCV RBC AUTO: 97 FL (ref 82–98)
MONOCYTES # BLD AUTO: 0.22 THOUSAND/ÂΜL (ref 0.17–1.22)
MONOCYTES NFR BLD AUTO: 7 % (ref 4–12)
NEUTROPHILS # BLD AUTO: 1.83 THOUSANDS/ÂΜL (ref 1.85–7.62)
NEUTS SEG NFR BLD AUTO: 59 % (ref 43–75)
NITRITE UR QL STRIP: NEGATIVE
NRBC BLD AUTO-RTO: 0 /100 WBCS
PH UR STRIP.AUTO: 6 [PH]
PLATELET # BLD AUTO: 65 THOUSANDS/UL (ref 149–390)
PMV BLD AUTO: 10.1 FL (ref 8.9–12.7)
POTASSIUM SERPL-SCNC: 2.9 MMOL/L (ref 3.5–5.3)
POTASSIUM SERPL-SCNC: 3.3 MMOL/L (ref 3.5–5.3)
POTASSIUM SERPL-SCNC: 3.6 MMOL/L (ref 3.5–5.3)
PROT SERPL-MCNC: 6 G/DL (ref 6.4–8.4)
PROT UR STRIP-MCNC: NEGATIVE MG/DL
RBC # BLD AUTO: 2.69 MILLION/UL (ref 3.81–5.12)
SODIUM SERPL-SCNC: 136 MMOL/L (ref 135–147)
SODIUM SERPL-SCNC: 137 MMOL/L (ref 135–147)
SODIUM SERPL-SCNC: 138 MMOL/L (ref 135–147)
SP GR UR STRIP.AUTO: <=1.005 (ref 1–1.03)
UROBILINOGEN UR QL STRIP.AUTO: 0.2 E.U./DL
WBC # BLD AUTO: 3.11 THOUSAND/UL (ref 4.31–10.16)

## 2025-07-17 PROCEDURE — 99223 1ST HOSP IP/OBS HIGH 75: CPT | Performed by: INTERNAL MEDICINE

## 2025-07-17 PROCEDURE — 83735 ASSAY OF MAGNESIUM: CPT | Performed by: FAMILY MEDICINE

## 2025-07-17 PROCEDURE — 80053 COMPREHEN METABOLIC PANEL: CPT | Performed by: FAMILY MEDICINE

## 2025-07-17 PROCEDURE — 80048 BASIC METABOLIC PNL TOTAL CA: CPT | Performed by: NURSE PRACTITIONER

## 2025-07-17 PROCEDURE — 85025 COMPLETE CBC W/AUTO DIFF WBC: CPT | Performed by: FAMILY MEDICINE

## 2025-07-17 PROCEDURE — 99233 SBSQ HOSP IP/OBS HIGH 50: CPT | Performed by: FAMILY MEDICINE

## 2025-07-17 PROCEDURE — 80048 BASIC METABOLIC PNL TOTAL CA: CPT | Performed by: FAMILY MEDICINE

## 2025-07-17 PROCEDURE — 99254 IP/OBS CNSLTJ NEW/EST MOD 60: CPT | Performed by: ORTHOPAEDIC SURGERY

## 2025-07-17 PROCEDURE — 81003 URINALYSIS AUTO W/O SCOPE: CPT | Performed by: FAMILY MEDICINE

## 2025-07-17 RX ORDER — MAGNESIUM SULFATE HEPTAHYDRATE 40 MG/ML
2 INJECTION, SOLUTION INTRAVENOUS ONCE
Status: COMPLETED | OUTPATIENT
Start: 2025-07-17 | End: 2025-07-17

## 2025-07-17 RX ORDER — MIDODRINE HYDROCHLORIDE 5 MG/1
10 TABLET ORAL ONCE
Status: COMPLETED | OUTPATIENT
Start: 2025-07-17 | End: 2025-07-17

## 2025-07-17 RX ORDER — ALBUMIN (HUMAN) 12.5 G/50ML
25 SOLUTION INTRAVENOUS ONCE
Status: COMPLETED | OUTPATIENT
Start: 2025-07-17 | End: 2025-07-17

## 2025-07-17 RX ORDER — FUROSEMIDE 10 MG/ML
40 SYRINGE (ML) INJECTION CONTINUOUS
Status: DISCONTINUED | OUTPATIENT
Start: 2025-07-17 | End: 2025-07-19

## 2025-07-17 RX ORDER — MIDODRINE HYDROCHLORIDE 5 MG/1
5 TABLET ORAL ONCE
Status: DISCONTINUED | OUTPATIENT
Start: 2025-07-17 | End: 2025-07-17

## 2025-07-17 RX ORDER — POTASSIUM CHLORIDE 14.9 MG/ML
20 INJECTION INTRAVENOUS
Status: COMPLETED | OUTPATIENT
Start: 2025-07-17 | End: 2025-07-17

## 2025-07-17 RX ORDER — OCTREOTIDE ACETATE 100 UG/ML
100 INJECTION, SOLUTION INTRAVENOUS; SUBCUTANEOUS EVERY 8 HOURS SCHEDULED
Status: DISCONTINUED | OUTPATIENT
Start: 2025-07-17 | End: 2025-07-19

## 2025-07-17 RX ORDER — FUROSEMIDE 10 MG/ML
40 INJECTION INTRAMUSCULAR; INTRAVENOUS ONCE
Status: COMPLETED | OUTPATIENT
Start: 2025-07-17 | End: 2025-07-17

## 2025-07-17 RX ORDER — ALBUMIN (HUMAN) 12.5 G/50ML
100 SOLUTION INTRAVENOUS ONCE
Status: COMPLETED | OUTPATIENT
Start: 2025-07-17 | End: 2025-07-17

## 2025-07-17 RX ADMIN — POTASSIUM CHLORIDE 40 MEQ: 1500 TABLET, EXTENDED RELEASE ORAL at 08:54

## 2025-07-17 RX ADMIN — ALBUMIN (HUMAN) 100 G: 0.25 INJECTION, SOLUTION INTRAVENOUS at 13:38

## 2025-07-17 RX ADMIN — OXYCODONE HYDROCHLORIDE 10 MG: 10 TABLET ORAL at 16:19

## 2025-07-17 RX ADMIN — AMITRIPTYLINE HYDROCHLORIDE 50 MG: 25 TABLET, FILM COATED ORAL at 21:36

## 2025-07-17 RX ADMIN — HEPARIN SODIUM 5000 UNITS: 5000 INJECTION INTRAVENOUS; SUBCUTANEOUS at 21:36

## 2025-07-17 RX ADMIN — POTASSIUM CHLORIDE 20 MEQ: 14.9 INJECTION, SOLUTION INTRAVENOUS at 05:51

## 2025-07-17 RX ADMIN — MAGNESIUM SULFATE HEPTAHYDRATE 2 G: 40 INJECTION, SOLUTION INTRAVENOUS at 09:45

## 2025-07-17 RX ADMIN — LACTULOSE 30 G: 10 SOLUTION ORAL at 13:33

## 2025-07-17 RX ADMIN — MIDODRINE HYDROCHLORIDE 10 MG: 5 TABLET ORAL at 13:33

## 2025-07-17 RX ADMIN — LACTULOSE 30 G: 10 SOLUTION ORAL at 08:51

## 2025-07-17 RX ADMIN — CYCLOBENZAPRINE 10 MG: 10 TABLET, FILM COATED ORAL at 05:50

## 2025-07-17 RX ADMIN — POLYETHYLENE GLYCOL 3350 17 G: 17 POWDER, FOR SOLUTION ORAL at 08:51

## 2025-07-17 RX ADMIN — DAPSONE 50 MG: 25 TABLET ORAL at 08:51

## 2025-07-17 RX ADMIN — Medication 20 MG/HR: at 15:19

## 2025-07-17 RX ADMIN — OCTREOTIDE ACETATE 100 MCG: 100 INJECTION, SOLUTION INTRAVENOUS; SUBCUTANEOUS at 21:35

## 2025-07-17 RX ADMIN — URSODIOL 300 MG: 300 CAPSULE ORAL at 18:07

## 2025-07-17 RX ADMIN — URSODIOL 300 MG: 300 CAPSULE ORAL at 08:51

## 2025-07-17 RX ADMIN — LACTULOSE 30 G: 10 SOLUTION ORAL at 18:07

## 2025-07-17 RX ADMIN — FOLIC ACID 1 MG: 1 TABLET ORAL at 08:51

## 2025-07-17 RX ADMIN — HEPARIN SODIUM 5000 UNITS: 5000 INJECTION INTRAVENOUS; SUBCUTANEOUS at 05:50

## 2025-07-17 RX ADMIN — LACTULOSE 30 G: 10 SOLUTION ORAL at 21:35

## 2025-07-17 RX ADMIN — HEPARIN SODIUM 5000 UNITS: 5000 INJECTION INTRAVENOUS; SUBCUTANEOUS at 13:35

## 2025-07-17 RX ADMIN — SODIUM CHLORIDE 1000 ML: 0.9 INJECTION, SOLUTION INTRAVENOUS at 10:56

## 2025-07-17 RX ADMIN — ALBUMIN (HUMAN) 25 G: 0.25 INJECTION, SOLUTION INTRAVENOUS at 01:28

## 2025-07-17 RX ADMIN — OXYCODONE HYDROCHLORIDE 10 MG: 10 TABLET ORAL at 06:10

## 2025-07-17 RX ADMIN — ALBUMIN (HUMAN) 25 G: 0.25 INJECTION, SOLUTION INTRAVENOUS at 09:18

## 2025-07-17 RX ADMIN — RIFAXIMIN 550 MG: 550 TABLET ORAL at 08:51

## 2025-07-17 RX ADMIN — POTASSIUM CHLORIDE 20 MEQ: 14.9 INJECTION, SOLUTION INTRAVENOUS at 03:34

## 2025-07-17 RX ADMIN — OCTREOTIDE ACETATE 100 MCG: 100 INJECTION, SOLUTION INTRAVENOUS; SUBCUTANEOUS at 13:35

## 2025-07-17 RX ADMIN — RIFAXIMIN 550 MG: 550 TABLET ORAL at 21:36

## 2025-07-17 RX ADMIN — FUROSEMIDE 40 MG: 10 INJECTION, SOLUTION INTRAMUSCULAR; INTRAVENOUS at 15:15

## 2025-07-17 RX ADMIN — PANTOPRAZOLE SODIUM 40 MG: 40 TABLET, DELAYED RELEASE ORAL at 05:50

## 2025-07-17 NOTE — ASSESSMENT & PLAN NOTE
Baseline creatinine around 0.9  Creatinine is 1.7  Patient did not make enough urine  Suspect contrast induced ATN  Also running hypotensive, received IV hydration, albumin therapy, midodrine  After discussion has not, nephrology recommending to place patient on Lasix drip-keep monitoring patient on telemetry, check BMP every 12 hours will remain in the Lasix drip  Critical care colic however this patient

## 2025-07-17 NOTE — TREATMENT PLAN
Upright XR personally reviewed, showing stable T12 fracture and L1 fracture (with evidence of kyphoplasty at L1). Stable appearing alignment when upright. Final read pending.     Plan:   Continue to monitor neuro exam closely.  TLSO back pack brace when upright > 45 degrees and OOB.  No bending, twisting, lifting > 10 lbs.  Mobilize as tolerated.  She was encouraged to obtain DXA scan at her last appointment with Dr. Davis - this does not appear to have been done. Would encourage osteoporosis workup.  DVT ppx: ok for pharmacologic DVT ppx.  Follow up in our office in 2 weeks with repeat XR. No transfer indicated.   
n/a

## 2025-07-17 NOTE — DISCHARGE INSTR - AVS FIRST PAGE
Neurosurgery discharge instructions following spine fracture:     TLSO brace to be worn when out of bed of head of bed greater than 45 degrees.  May place brace on while sitting on edge of bed. May be removed for showering.   No bending, twisting or heavy lifting. No strenuous activities. No Driving.   Follow-up as scheduled in two weeks with repeat spine x-rays to be completed 2-3 days prior to visit.  Prescription has been entered electronically.      **Please notify MD immediately if you have increased back or leg pain. New numbness, tingling, weakness in your legs and/or bowel/bladder incontinence**          Pt to continue buddy taping right long and ring fingers together and work on finger motion.  May ice finger for pain or swelling for 20 minutes as needed.

## 2025-07-17 NOTE — NURSING NOTE
New orders for 40mg IV lasix x1 and continuous lasix drip received without BP parameters. This RN reached out to Dr. Wilkins to verify BP parameters. This RN was instructed to give lasix without BP parameters. This RN asked Dr. Keyes if there should be BP parameters on the lasix prior to administering. Dr. Keyes ordered 1x dose of midodrine and 400ml albumin. Instructed to hold lasix until BP improves. After medication BP improved to 98/58. Dr. Keyes notified and instructed to initiate drip.

## 2025-07-17 NOTE — RESPIRATORY THERAPY NOTE
RT Protocol Note  Bailey Olsen 54 y.o. female MRN: [id number]  Unit/Bed#: -01 Encounter: [id number]    Assessment    Principal Problem:    Hypokalemia  Active Problems:    Liver cirrhosis secondary to HYATT (nonalcoholic steatohepatitis) (HCC)    Pancytopenia (HCC)    Fall    Morbid obesity with BMI of 40.0-44.9, adult (HCC)    Chronic, continuous use of opioids    Closed fracture of phalanx of right middle finger    Closed fracture of twelfth thoracic vertebra (HCC)    Acute renal failure with tubular necrosis (HCC)    ATN (acute tubular necrosis) (HCC)      Home Pulmonary Medications:         Past Medical History[1]  Social History[2]    Subjective         Objective    Physical Exam:   Assessment Type: Assess only  General Appearance: Alert, Awake  Respiratory Pattern: Normal  Chest Assessment: Chest expansion symmetrical  O2 Device: RA    Vitals:  Blood pressure (!) 98/38, pulse 102, temperature (!) 97 °F (36.1 °C), temperature source Temporal, resp. rate 18, weight 118 kg (259 lb 0.7 oz), SpO2 91%.          Imaging and other studies:     O2 Device: RA     Plan    Respiratory Plan: No distress/Pulmonary history, Discontinue Protocol  Airway Clearance Plan: Incentive Spirometer     Resp Comments: pt admitted s/p fall, hypokalemia, arf, fracture vertebra. pt denies any pulmonary history or sleep apnea. pt instructed on IS and demonstrated competency.        [1]   Past Medical History:  Diagnosis Date    Back pain     Carcinoid tumor     neuroendocrine    CHF (congestive heart failure) (HCC)     Cirrhosis of liver (HCC)     Hypertension     Sarcoidosis    [2]   Social History  Socioeconomic History    Marital status: /Civil Union   Tobacco Use    Smoking status: Never    Smokeless tobacco: Never   Vaping Use    Vaping status: Never Used   Substance and Sexual Activity    Alcohol use: Never    Drug use: Never    Sexual activity: Not Currently     Social Drivers of Health     Financial Resource Strain:  Patient Declined (3/11/2025)    Received from [facility]    Financial Insecurity     In the last 12 months did you skip medications to save money?: No     In the last 12 months was there a time when you needed to see a doctor but could not because of cost?: Decline to Answer   Food Insecurity: No Food Insecurity (7/16/2025)    Nursing - Inadequate Food Risk Classification     Worried About Running Out of Food in the Last Year: Never true     Ran Out of Food in the Last Year: Never true     Ran Out of Food in the Last Year: Never true   Transportation Needs: Unmet Transportation Needs (7/16/2025)    Nursing - Transportation Risk Classification     Lack of Transportation: Yes   Stress: Stress Concern Present (1/4/2024)    Received from [facility]    [employer] - Occupational Stress Questionnaire     Feeling of Stress : Very much   Social Connections: Socially Integrated (3/11/2025)    Received from [facility]    Social Connection     Do you often feel lonely?: No   Intimate Partner Violence: Unknown (7/16/2025)    Nursing IPS     Physically Hurt by Someone: No     Hurt or Threatened by Someone: No   Housing Stability: Unknown (7/16/2025)    Nursing: Inadequate Housing Risk Classification     Unable to Pay for Housing in the Last Year: No     Has Housing: No

## 2025-07-17 NOTE — CONSULTS
Orthopedics   Bailey Olsen 54 y.o. female MRN: [id number]  Unit/Bed#: OW XRAY      Chief Complaint:   Right long finger pain    HPI:  54 y.o. right hand dominant female complaining of right long finger pain after falling on a wet floor in a restaurant bathroom.  In the fall she and her hand into the wall believe she sustained a type of hyperextension injury to the finger.  She notes pain primarily over the volar surface of the right long finger near the PIP joint.  She denies any numbness or tingling.  She denies any pain in the right hand.  There was left forehead strike.  Patient also has a T12 superior endplate compression fracture managed by neurosurgery.  Denies use of blood thinners. Pain is achey in character, acute in onset, constant in duration, moderate in intensity. Exacerbating factors finger extension and motion of the PIP joint, remitting factors are rest.  Non-radiating, denies numbness or tingling.  No open wounds noted.  Patient with mild back pain.  No other complaints at this time. PMH significant for liver cirrhosis secondary to HYATT, and morbid obesity.      Review Of Systems:   Skin: Ecchymosis over the PIP joint of the right long finger more dorsally.  Neuro: See HPI  Musculoskeletal: See HPI  14 point review of systems negative except as stated above     Past Medical History:   Past Medical History[1]    Past Surgical History:   Past Surgical History[2]    Family History:  Family history reviewed and non-contributory  Family History[3]    Social History:  Social History[4]    Allergies:   Allergies[5]    Labs:  0   Lab Value Date/Time    HCT 26.0 (L) 07/17/2025 0610    HCT 27.5 (L) 07/16/2025 1009    HCT 28.1 (L) 06/17/2025 0647    HGB 8.2 (L) 07/17/2025 0610    HGB 8.6 (L) 07/16/2025 1009    HGB 8.8 (L) 06/17/2025 0647    INR 1.95 (H) 07/16/2025 1009    WBC 3.11 (L) 07/17/2025 0610    WBC 3.05 (L) 07/16/2025 1009    WBC 2.43 (L) 06/17/2025 0647    CRP 51.4 (H) 05/30/2024 0512    CRP 2.05  "(H) 10/11/2023 1939     Meds:  Current Medications[6]    Blood Culture:   Lab Results   Component Value Date    BLOODCX No Growth After 5 Days. 02/25/2025    BLOODCX Moraxella osloensis (A) 02/25/2025     Wound Culture:   No results found for: \"WOUNDCULT\"    Ins and Outs:  I/O last 24 hours:  In: 1039.2 [P.O.:600; I.V.:80; IV Piggyback:359.2]  Out: 100 [Urine:100]    Physical Exam:   BP 97/52   Pulse 100   Temp (!) 97.3 °F (36.3 °C) (Temporal)   Resp 18   Wt 118 kg (259 lb 0.7 oz)   SpO2 93%   BMI 41.81 kg/m²   Gen: No acute distress, resting comfortably in bed  HEENT: Eyes clear, moist mucus membranes, hearing intact  Respiratory: No audible wheezing or stridor  Cardiovascular: Well Perfused peripherally, 2+ distal pulse  Abdomen: nondistended, no peritoneal signs  Musculoskeletal: right upper extremity  Skin intact about the right long finger.  There is ecchymosis forming more dorsally at the PIP joint.  TTP on the volar surface of the PIP joint of the right long finger.  There is no tenderness along the DIP joint or distal phalanx.  She has no pain about the proximal phalanx or MCP joint or any of the metacarpals or other fingers.  She was able to get within 2 degrees of full extension and can flex PIP joint approximately 60 degrees.  There is no rotational deformity.  Sensation is intact to light touch distally and proximally.  Capillary refill is brisk in the involved digit and all other digits of the right hand.      2+ radial and ulnar pulse  Musculature is soft and compressible.      Tertiary exam was negative for additional palpable stepoffs or additional bony tenderness to palpation.    Radiology:   I personally reviewed the films.  Hand x rays AP/Lateral and oblique views show findings suspicious for a subtle fracture involving the base of the third middle phalanx at the PIP joint, ventrally and radially. This might be further assessed with MRI if deemed clinically appropriate for management.   "   Assessment:54 y.o. right hand dominant female status post fall in restaurant bathroom with tiny fracture volar side of the proximal middle phalanx right long finger.     Plan:   NWB right finger.  The finger was buddy taped to the fourth finger.  Continue buddy tape until seen in orthopedic office in 2 weeks.  Gentle motion to the finger while buddy taped.  She may apply ice to the area for pain and swelling.  Body mass index is 41.81 kg/m². morbidly obese. Recommend behavior modifications  Dispo: Ortho signing off    Loki Farr PA-C    This consult was completed with the senior resident and attending on call.          [1]   Past Medical History:  Diagnosis Date    Back pain     Carcinoid tumor     neuroendocrine    CHF (congestive heart failure) (HCC)     Cirrhosis of liver (HCC)     Hypertension     Sarcoidosis    [2]   Past Surgical History:  Procedure Laterality Date    CHOLECYSTECTOMY      GASTRIC BYPASS      HERNIA REPAIR      IR KYPHOPLASTY/VERTEBROPLASTY  10/15/2024   [3]   Family History  Problem Relation Name Age of Onset    Hypertension Father      Heart attack Father      No Known Problems Sister      No Known Problems Brother      No Known Problems Brother      No Known Problems Brother      No Known Problems Brother      No Known Problems Brother      No Known Problems Daughter     [4]   Social History  Socioeconomic History    Marital status: /Civil Union   Tobacco Use    Smoking status: Never    Smokeless tobacco: Never   Vaping Use    Vaping status: Never Used   Substance and Sexual Activity    Alcohol use: Never    Drug use: Never    Sexual activity: Not Currently     Social Drivers of Health     Financial Resource Strain: Patient Declined (3/11/2025)    Received from [facility]    Financial Insecurity     In the last 12 months did you skip medications to save money?: No     In the last 12 months was there a time when you needed to see a doctor but could not  because of cost?: Decline to Answer   Food Insecurity: No Food Insecurity (7/16/2025)    Nursing - Inadequate Food Risk Classification     Worried About Running Out of Food in the Last Year: Never true     Ran Out of Food in the Last Year: Never true     Ran Out of Food in the Last Year: Never true   Transportation Needs: Unmet Transportation Needs (7/16/2025)    Nursing - Transportation Risk Classification     Lack of Transportation: Yes   Stress: Stress Concern Present (1/4/2024)    Received from [facility]    [employer] - Occupational Stress Questionnaire     Feeling of Stress : Very much   Social Connections: Socially Integrated (3/11/2025)    Received from [facility]    Social Connection     Do you often feel lonely?: No   Intimate Partner Violence: Unknown (7/16/2025)    Nursing IPS     Physically Hurt by Someone: No     Hurt or Threatened by Someone: No   Housing Stability: Unknown (7/16/2025)    Nursing: Inadequate Housing Risk Classification     Unable to Pay for Housing in the Last Year: No     Has Housing: No   [5]   Allergies  Allergen Reactions    Zolpidem GI Intolerance, Other (See Comments) and Hives     Other Reaction(s): Other      Blacks out    Blacks out    Tapentadol Sneezing and Other (See Comments)     Zoned out    Alprazolam Other (See Comments)     Other Reaction(s): Other      Feels zoned out    Feels zoned out    Amoxicillin-Pot Clavulanate Hives and Nausea Only     Amoxil fine    Dust Mite Extract Sneezing    Morphine Headache, Nausea Only and Vomiting    Tramadol GI Intolerance    Azelastine Rash    Azelastine Hcl Rash    Molds & Smuts Rash     Other Reaction(s): Unknown   [6]   Current Facility-Administered Medications:     amitriptyline (ELAVIL) tablet 50 mg, 50 mg, Oral, HS, Ruslan Keyes MD, 50 mg at 07/16/25 2119    cyclobenzaprine (FLEXERIL) tablet 10 mg, 10 mg, Oral, TID PRN, Ruslan Keyes MD, 10 mg at  07/17/25 0550    dapsone tablet 50 mg, 50 mg, Oral, Daily, Ruslan Keyes MD, 50 mg at 07/17/25 0851    folic acid (FOLVITE) tablet 1 mg, 1 mg, Oral, Daily, Ruslan Keyes MD, 1 mg at 07/17/25 0851    furosemide (LASIX) 500 mg infusion 50 mL, 20 mg/hr, Intravenous, Continuous, Ilia Wilkins MD    furosemide (LASIX) injection 40 mg, 40 mg, Intravenous, Once, Ilia Wilkins MD    heparin (porcine) subcutaneous injection 5,000 Units, 5,000 Units, Subcutaneous, Q8H MIGUEL, Ruslan Keyes MD, 5,000 Units at 07/17/25 0550    lactulose (CHRONULAC) oral solution 30 g, 30 g, Oral, 4x Daily, Ruslan Keyes MD, 30 g at 07/17/25 0851    lidocaine (LIDODERM) 5 % patch 1 patch, 1 patch, Topical, Daily, Ruslan Keyes MD    [Held by provider] metoprolol tartrate (LOPRESSOR) tablet 25 mg, 25 mg, Oral, Q12H MIGUEL, Ruslan Keyes MD    octreotide (SandoSTATIN) injection 100 mcg, 100 mcg, Subcutaneous, Q8H MIGUEL, Ilia Wilkins MD    oxyCODONE (ROXICODONE) immediate release tablet 10 mg, 10 mg, Oral, Q6H PRN, Ruslan Keyes MD, 10 mg at 07/17/25 0610    pantoprazole (PROTONIX) EC tablet 40 mg, 40 mg, Oral, Early Morning, Ruslan Keyes MD, 40 mg at 07/17/25 0550    polyethylene glycol (MIRALAX) packet 17 g, 17 g, Oral, Daily, Ruslan Keyes MD, 17 g at 07/17/25 0851    rifaximin (XIFAXAN) tablet 550 mg, 550 mg, Oral, Q12H MIGUEL, Ruslan Keyes MD, 550 mg at 07/17/25 0851    sodium chloride 0.9 % bolus 1,000 mL, 1,000 mL, Intravenous, Once, Ruslan Keyes MD, Last Rate: 500 mL/hr at 07/17/25 1056, 1,000 mL at 07/17/25 1056    trimethobenzamide (TIGAN) IM injection 200 mg, 200 mg, Intramuscular, Q6H PRN, Ruslan Keyes MD, 200 mg at 07/16/25 1656    ursodiol (ACTIGALL) capsule 300 mg, 300 mg, Oral, BID, Ruslan Keyes MD, 300 mg at 07/17/25 0840

## 2025-07-17 NOTE — UTILIZATION REVIEW
Initial Clinical Review    Admission: Date/Time/Statement:   Admission Orders (From admission, onward)       Ordered        07/16/25 1300  INPATIENT ADMISSION  Once                          Orders Placed This Encounter   Procedures    INPATIENT ADMISSION     Standing Status:   Standing     Number of Occurrences:   1     Level of Care:   Med Surg [16]     Estimated length of stay:   More than 2 Midnights     Certification:   I certify that inpatient services are medically necessary for this patient for a duration of greater than two midnights. See H&P and MD Progress Notes for additional information about the patient's course of treatment.     ED Arrival Information       Expected   -    Arrival   7/16/2025 09:43    Acuity   Urgent              Means of arrival   Wheelchair    Escorted by   Self    Service   Hospitalist    Admission type   Emergency              Arrival complaint   FALL yesterday  hs, nbt  head, shoulder & hand injury             Chief Complaint   Patient presents with    Headache     States she had multiple falls in the past due ot mobility issues and uses a walker to ambulate. States she went out ot the back yesterday and did nto see the floor was wet resulting in her slipping and falling. Patient has a ecchymotic area left periorbital and left knee. Called her PCP this am and was told to get evaluated for a head injury.       Initial Presentation: 54 y.o. female to ED via WC from home  Present to ED with fall at home..States she had multiple falls in the past due ot mobility issues and uses a walker to ambulate. Endorses floor was wet resulting in her slipping and falling.   PMHX morbid obesity, liver cirrhosis   Admitted to [facility] with DX: Hypokalemia   on exam: T99.7; tachy; ecchymotic area left periorbital and left knee. Pain 8/10 WBC 3.05; Heme 8.6; K 2.1; CO2 42; Ca 7.4; Albumin 2.5; T bili 3.06; AST 72; INR 1.95   EKG also shows prolonged QT ; Xray T12 fracture, right third finger fracture    PLAN: albumin iv x1; lactulose po; recd KCL iv x3; pain control; monitor labs; tele monitoring; PT/ OT eval - tx; ortho consult; nephrology consult; neuro surgery consult        Anticipated Length of Stay/Certification Statement: Patient will be admitted on an observation basis with an anticipated length of stay of less than 2 midnights secondary to fall, finger and thoracic 12 fracture, pending orthopedic evaluation, PT OT recommendation.       Date: 7/17/25      Day 2  Patient laying on the bed, emotional after during that she has broken vertebra. Also having some pain.   Exam: hypotensive; tachy; Mg 1.8; Cr 1.7 (baseline 0.9); potassium improved. Heme 8.2; WBC 3.11; Ca 7.6; albumin 2.7; T bili 2.36  Plan: recd albumin iv x3; KCL iv x3; Mg iv x1; recd sandostatin SQ x1; recd ivf bolus 1L x1; lactulose po; pain control; monitor labs; tele monitoring; f/u PT/ OT recom      NEURO SURGERY CONSULT   Upright XR showing stable T12 fracture and L1 fracture (with evidence of kyphoplasty at L1). Stable appearing alignment when upright. Final read pending.   Plan: neuro monitoring; TLSO brace; No bending, twisting, lifting > 10 lbs.       NEPHROLOGY CONSULT   Acute renal failure with tubular necrosis:  Creatinine is up to 1.7 today she feels she is not urinating much. Electrolytes are acceptable potassium is 3.6.   Plan: Hold torsemide, spironolactone, metoprolol for now. Monitor urine output and renal function and   Hemodynamics      ORTHO CONSULT   Assessment: Volar Harmon Avulsion Fracure Rt LF P2  Plan: Cosmo tape. Use of hand, including LF, as tolerated for daily activities. No strenuous ectivity or lifting more than 10 pounds            ED Treatment-Medication Administration from 07/16/2025 0938 to 07/16/2025 1606         Date/Time Order Dose Route Action     07/16/2025 1009 ondansetron (ZOFRAN) injection 4 mg 4 mg Intravenous Given     07/16/2025 1009 fentaNYL injection 25 mcg 25 mcg Intravenous Given     07/16/2025  1123 ketorolac (TORADOL) injection 15 mg 15 mg Intravenous Given     07/16/2025 1131 potassium chloride oral solution 40 mEq 40 mEq Oral Given     07/16/2025 1137 potassium chloride 20 mEq IVPB (premix) 20 mEq Intravenous New Bag     07/16/2025 1144 iohexol (OMNIPAQUE) 350 MG/ML injection (MULTI-DOSE) 100 mL 100 mL Intravenous Given     07/16/2025 1502 fentaNYL injection 50 mcg 50 mcg Intravenous Given            Scheduled Medications:  amitriptyline, 50 mg, Oral, HS  dapsone, 50 mg, Oral, Daily  folic acid, 1 mg, Oral, Daily  heparin (porcine), 5,000 Units, Subcutaneous, Q8H MIGUEL  lactulose, 30 g, Oral, 4x Daily  lidocaine, 1 patch, Topical, Daily  [Held by provider] metoprolol tartrate, 25 mg, Oral, Q12H MIGUEL  octreotide, 100 mcg, Subcutaneous, Q8H MIGUEL  pantoprazole, 40 mg, Oral, Early Morning  polyethylene glycol, 17 g, Oral, Daily  rifaximin, 550 mg, Oral, Q12H MIGUEL  ursodiol, 300 mg, Oral, BID  potassium chloride 20 mEq IVPB (premix), Intravenous Q2H 7/16 recd x3; 7/17 recd x3       albumin human (FLEXBUMIN) 25 % injection 25 g  Dose: 25 g  Freq: Once Route: IV  Last Dose: Stopped (07/17/25 0008)  Start: 07/16/25 2315 End: 07/17/25 0008    albumin human (FLEXBUMIN) 25 % injection 25 g  Dose: 25 g  Freq: Once Route: IV  Last Dose: Stopped (07/17/25 0226)  Start: 07/17/25 0045 End: 07/17/25 0226    albumin human (FLEXBUMIN) 25 % injection 25 g  Dose: 25 g  Freq: Once Route: IV  Start: 07/17/25 0915 End: 07/17/25 0918    albumin human (FLEXBUMIN) 25 % injection 100 g  Dose: 100 g  Freq: Once Route: IV  Start: 07/17/25 1330 End: 07/17/25 1338    magnesium sulfate 2 g/50 mL IVPB (premix) 2 g  Dose: 2 g  Freq: Once Route: IV  Last Dose: 2 g (07/17/25 0945)  Start: 07/17/25 0915 End: 07/17/25 1145    octreotide (SandoSTATIN) injection 100 mcg  Dose: 100 mcg  Freq: Every 8 hours scheduled Route: SC  Start: 07/17/25 1200    sodium chloride 0.9 % bolus 1,000 mL  Dose: 1,000 mL  Freq: Once Route: IV  Last Dose: 1,000 mL  (07/17/25 1056)  Start: 07/17/25 1045 End: 07/17/25 1256      Continuous IV Infusions: None      PRN Meds:  cyclobenzaprine, 10 mg, Oral, TID PRN: 7/17 x1  oxyCODONE, 10 mg, Oral, Q6H PRN: 7/16 x1; 7/17 x1 so far  trimethobenzamide, 200 mg, Intramuscular, Q6H PRN; 7/16 x1      ED Triage Vitals [07/16/25 0949]   Temperature Pulse Respirations Blood Pressure SpO2 Pain Score   99.7 °F (37.6 °C) 99 16 134/66 96 % 8     Weight (last 2 days)       Date/Time Weight    07/16/25 1632 118 (259.04)    07/16/25 0951 117 (258.6)            Vital Signs (last 3 days)       Date/Time Temp Pulse Resp BP MAP (mmHg) SpO2 O2 Device Patient Position - Orthostatic VS Pain    07/17/25 15:08:58 97 °F (36.1 °C) 117 18 98/58 -- 91 % -- -- --    07/17/25 1235 97.2 °F (36.2 °C) 101 18 89/49 55 96 % -- -- --    07/17/25 1135 97.3 °F (36.3 °C) 100 18 97/52 51 93 % -- -- --    07/17/25 1041 -- 92 -- 86/36 -- -- -- Lying --    07/17/25 0902 -- 93 -- 87/50 62 -- -- -- --    07/17/25 0851 -- -- -- -- -- -- -- -- 3    07/17/25 07:50:43 97.2 °F (36.2 °C) 93 19 99/50 66 93 % None (Room air) -- --    07/17/25 0610 -- -- -- -- -- -- -- -- 8    07/17/25 0240 -- -- -- 88/50 -- -- -- Lying --    07/17/25 0013 -- 81 18 80/52 57 -- -- -- --    07/16/25 2301 -- -- -- 80/44 -- -- -- -- --    07/16/25 2258 -- -- 18 78/42 -- -- -- Lying --    07/16/25 22:49:16 97.3 °F (36.3 °C) 92 18 79/49 60 93 % None (Room air) Lying --    07/16/25 2119 -- -- -- -- -- -- -- -- 8 07/16/25 21:17:32 97.2 °F (36.2 °C) 95 16 95/56 65 92 % None (Room air) Lying --    07/16/25 2000 -- -- -- -- -- 93 % None (Room air) -- --    07/16/25 1900 97.3 °F (36.3 °C) -- 18 112/56 81 -- -- Lying --    07/16/25 1737 -- 99 -- 132/82 91 -- -- -- --    07/16/25 1625 -- -- -- 100/49 -- -- -- -- --    07/16/25 16:20:38 -- 102 18 100/49 64 96 % None (Room air) -- --    07/16/25 1532 -- -- -- -- -- -- -- -- 6    07/16/25 1502 -- -- -- -- -- -- -- -- 8 07/16/25 1330 -- 92 19 112/59 78 96 % -- -- --     07/16/25 1315 -- 95 22 124/64 89 96 % -- -- --    07/16/25 1300 -- 97 19 136/71 96 98 % -- -- --    07/16/25 1215 -- 92 21 129/68 90 94 % -- -- --    07/16/25 1155 -- -- -- -- -- -- -- -- 5    07/16/25 1123 -- -- -- -- -- -- -- -- 7    07/16/25 1039 -- -- -- -- -- -- -- -- 6    07/16/25 1009 -- -- -- -- -- -- -- -- 8    07/16/25 1000 -- 94 -- 134/66 94 96 % -- -- --    07/16/25 0949 99.7 °F (37.6 °C) 99 16 134/66 94 96 % -- Lying 8              Pertinent Labs/Diagnostic Test Results:   Radiology:  XR spine thoracolumbar 2 vw   Final Interpretation by Cristian Kenney MD (07/17 1317)      No change T12, L1 compression fractures and L1 kyphoplasty.            Workstation performed: [id number]         CT head without contrast   Final Interpretation by Eduardo Irvin MD (07/16 1214)      No acute intracranial abnormality.                  Workstation performed: [id number]         CT chest abdomen pelvis w contrast   Final Interpretation by Eduardo Irvin MD (07/16 1239)      1.  Age-indeterminate superior endplate compression fracture at T12, not present in May 2025 and possibly acute. Clinical correlation recommended.      2.  No acute traumatic injury to the chest, abdomen or pelvis viscera.      3.  Cirrhosis and portal hypertension.      4.  New juxtapleural nodule in the medial left lower lobe measuring 3 mm with additional 3 mm nodules in the left upper and lower lobes, stable from recent studies though not present in 2024. Based on current Fleischner Society 2017 Guidelines on    incidental pulmonary nodule, no routine follow-up is needed if the patient is low risk. If the patient is high risk, optional follow-up chest CT at 12 months can be considered.      5.  Stable groundglass density in the left upper lobe, possibly postinflammatory scarring.      The study was marked in EPIC for immediate notification.      Computerized Assisted Algorithm (CAA) may have aided analysis of applicable images.       Workstation performed: [id number]         XR finger third digit-middle RIGHT   ED Interpretation by Olvin Rivero DO (07/16 1038)   No acute fracture.      Final Interpretation by Sreekanth Alexander MD (07/16 0322)      Findings suspicious for a subtle fracture involving the base of the third middle phalanx at the PIP joint, ventrally and radially. This might be further assessed with MRI if deemed clinically appropriate for management.      The study was marked in EPIC for immediate notification.         Computerized Assisted Algorithm (CAA) may have been used to analyze all applicable images.               Workstation performed: [id number]         XR knee 4+ vw left injury   ED Interpretation by Olvin Rivero DO (07/16 1038)   Mild degenerative changes without acute fracture.      Final Interpretation by Sreekanth Alexander MD (07/16 4787)      No acute osseous abnormality.         Computerized Assisted Algorithm (CAA) may have been used to analyze all applicable images.         Workstation performed: [id number]         XR spine thoracolumbar 2 vw    (Results Pending)     Cardiology:  ECG 12 lead   Final Result by Rod Medel MD (07/16 6555)   ** ** Critical Test Result: Long QTc   Normal sinus rhythm   ST & T wave abnormality, consider inferolateral ischemia  vs. nonspecific    abnormalities.   Prolonged QT   Abnormal ECG   When compared with ECG of 14-Jun-2025 15:03,   T wave inversion now evident in Anterolateral leads   Confirmed by Rod Medel ([id number]) on 7/16/2025 11:47:57 AM           Results from last 7 days   Lab Units 07/17/25  0610 07/16/25  1009   WBC Thousand/uL 3.11* 3.05*   HEMOGLOBIN g/dL 8.2* 8.6*   HEMATOCRIT % 26.0* 27.5*   PLATELETS Thousands/uL 65* 68*   TOTAL NEUT ABS Thousands/µL 1.83* 2.26         Results from last 7 days   Lab Units 07/17/25  0542 07/17/25  0133 07/16/25  1825 07/16/25  1009   SODIUM mmol/L 136 138 136 139   POTASSIUM mmol/L 3.6 2.9* 2.7* 2.1*    CHLORIDE mmol/L 94* 95* 92* 89*   CO2 mmol/L 33* 38* 35* 42*   ANION GAP mmol/L 9 5 9 8   BUN mg/dL 14 12 11 10   CREATININE mg/dL 1.76* 1.54* 1.09 0.98   EGFR ml/min/1.73sq m 32 37 57 65   CALCIUM mg/dL 7.6* 7.3* 7.6* 7.4*   MAGNESIUM mg/dL 1.8*  --   --   --      Results from last 7 days   Lab Units 07/17/25  0542 07/16/25  1009   AST U/L 76* 72*   ALT U/L 22 32   ALK PHOS U/L 171* 212*   TOTAL PROTEIN g/dL 6.0* 6.4   ALBUMIN g/dL 2.7* 2.5*   TOTAL BILIRUBIN mg/dL 2.36* 3.06*         Results from last 7 days   Lab Units 07/17/25  0542 07/17/25  0133 07/16/25  1825 07/16/25  1009   GLUCOSE RANDOM mg/dL 76 89 166* 115        Results from last 7 days   Lab Units 07/16/25  1009   PROTIME seconds 23.1*   INR  1.95*   PTT seconds 38*        Past Medical History[1]  Present on Admission:   Chronic, continuous use of opioids   Liver cirrhosis secondary to HYATT (nonalcoholic steatohepatitis) (HCC)   Hypokalemia   Pancytopenia (HCC)   Fall      Admitting Diagnosis: Hypokalemia [E87.6]  Finger fracture, right [S62.609A]  QT prolongation [R94.31]  Intractable back pain [M54.9]  Fall, initial encounter [W19.XXXA]  Headache [R51.9]  Ambulatory dysfunction [R26.2]  Closed fracture of twelfth thoracic vertebra (HCC) [S22.089A]  Age/Sex: 54 y.o. female    Network Utilization Review Department  ATTENTION: Please call with any questions or concerns to [phone] and carefully listen to the prompts so that you are directed to the right person. All voicemails are confidential.   For Discharge needs, contact Care Management DC Support Team at [phone] opt. 2  Send all requests for admission clinical reviews, approved or denied determinations and any other requests to dedicated fax number below belonging to the campus where the patient is receiving treatment. List of dedicated fax numbers for the Facilities:  FACILITY NAME UR FAX NUMBER   ADMISSION DENIALS (Administrative/Medical Necessity) [phone]   DISCHARGE SUPPORT TEAM  (NETWORK) [phone]   PARENT CHILD HEALTH (Maternity/NICU/Pediatrics) [phone]   [facility] [phone]   [facility] [phone]   [facility] [phone]   [facility] [phone]   [facility] [phone]   [facility] [phone]   [facility] [phone]   [facility] [phone]   [facility] [phone]   [facility] [phone]   [facility] [phone]   [facility] [phone]              [1]   Past Medical History:  Diagnosis Date    Back pain     Carcinoid tumor     neuroendocrine    CHF (congestive heart failure) (HCC)     Cirrhosis of liver (HCC)     Hypertension     Sarcoidosis

## 2025-07-17 NOTE — CASE MANAGEMENT
Case Management Assessment & Discharge Planning Note    Patient name Bailey Olsen  Location /-01 MRN [id number]  : 1970 Date 2025       Current Admission Date: 2025  Current Admission Diagnosis:Hypokalemia   Patient Active Problem List    Diagnosis Date Noted    Acute renal failure with tubular necrosis (HCC) 2025    ATN (acute tubular necrosis) (HCC) 2025    Closed fracture of phalanx of right middle finger 2025    Closed fracture of twelfth thoracic vertebra (HCC) 2025    Stage 3a chronic kidney disease (HCC) 2025    Scalp hematoma 2025    Contusion of back 2025    Ecchymosis 2025    Falls 2025    Chest pain 2025    Abdominal pain 2025    Acute kidney injury (HCC) 2025    Gram-negative bacteremia 2025    Acute respiratory failure (HCC) 2025    Carcinoid tumor of pancreas 2025    Hypomagnesemia 2025    Upper GI bleed 2025    Acute anemia 2025    Rectal bleeding 2025    Acute blood loss anemia 2025    Chronic, continuous use of opioids 2025    Acute metabolic encephalopathy 2025    SIRS (systemic inflammatory response syndrome) (HCC) 2025    Sacral fracture, closed (HCC) 11/15/2024    KISHORE (acute kidney injury) (HCC) 10/04/2024    Morbid obesity with BMI of 40.0-44.9, adult ([facility]) 10/04/2024    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) 2024    Fall from standing 2024    Closed compression fracture of L1 lumbar vertebra, sequela 2024    Closed stable burst fracture of first lumbar vertebra with routine healing 2024    Groin hematoma 2024    Ambulatory dysfunction 2024    Intractable back pain 2024    Fall 2024    Liver cirrhosis secondary to HYATT (nonalcoholic steatohepatitis) (HCC) 2024    History of malignant carcinoid tumor of small intestine 2024    Class 3  severe obesity due to excess calories in adult 05/26/2024    Primary hypertension 05/26/2024    Hypokalemia 05/26/2024    Fibromyalgia 05/26/2024    Iron deficiency anemia 05/26/2024    Pancytopenia (HCC) 05/26/2024    Sarcoidosis 05/26/2024    Bilateral pulmonary infiltrates 05/26/2024      LOS (days): 1  Geometric Mean LOS (GMLOS) (days): 4.6  Days to GMLOS:3.5     OBJECTIVE:  PATIENT READMITTED TO HOSPITAL  Risk of Unplanned Readmission Score: 74.48         Current admission status: Inpatient       Preferred Pharmacy:   [facility] [place], [place] - [address] [facility]  [address] [address]  Phone: [phone] Fax: [phone]    [id number] - [place], [place] - [address] [facility]  [address] [place] [address]  Phone: [phone] Fax: [phone]    Primary Care Provider: Nayely Brown DO    Primary Insurance: BLUE CROSS  Secondary Insurance:     ASSESSMENT:  Active Health Care Proxies       Cristian Olsen Health Care Representative - Spouse   Primary Phone: [phone] (Mobile)                 Advance Directives  Does patient have a Health Care POA?: No  Was patient offered paperwork?: Yes (declined)  Does patient currently have a Health Care decision maker?: No  Does patient have Advance Directives?: No  Was patient offered paperwork?: Yes (declinded)  Primary Contact: , Cristian, Christina, daughter, sister, willam              Patient Information  Admitted from:: Home  Mental Status: Alert  During Assessment patient was accompanied by: Spouse  Assessment information provided by:: Patient  Primary Caregiver: Self  Support Systems: Daughter, Spouse/significant other, Family members  [facility] Residence: [facility]  Home entry access options. Select all that apply.: Stairs  Number of steps to enter home.: 1  Type of Current Residence: 2 story home  Upon entering residence, is there a bedroom on the main floor (no further steps)?: Yes  Upon  entering residence, is there a bathroom on the main floor (no further steps)?: Yes  Living Arrangements: Lives w/ Spouse/significant other, Lives w/ Children  Is patient a ?: No    Activities of Daily Living Prior to Admission  Functional Status: Independent  Completes ADLs independently?: Yes  Ambulates independently?: Yes  Does patient use assisted devices?: Yes  Assisted Devices (DME) used: Walker  Does patient currently own DME?: Yes  What DME does the patient currently own?: Walker, Wheelchair, Straight Cane, Bedside Commode, Other (Comment) (marline)  Does patient have a history of Outpatient Therapy (PT/OT)?: No  Does the patient have a history of Short-Term Rehab?: Yes (arely peña)  Does patient have a history of HHC?: Yes (advantage, tru, accent)  Does patient currently have HHC?: Yes (accent)    Current Home Health Care  Type of Current Home Care Services: Home PT, Nurse visit  Home Health Agency Name:: [facility]  Current Home Health Follow-Up Provider:: PCP    Patient Information Continued  Income Source: SSI/SSD  Does patient have prescription coverage?: Yes  Can the patient afford their medications and any related supplies (such as glucometers or test strips)?: Yes  Does patient receive dialysis treatments?: No  Does patient have a history of substance abuse?: No  Does patient have a history of Mental Health Diagnosis?: No                    DISCHARGE DETAILS:    Discharge planning discussed with:: pt  Freedom of Choice: Yes     CM contacted family/caregiver?: Yes ( at bedside)             Contacts  Patient Contacts: Cristian Olsen- spouse  Relationship to Patient:: Family  Contact Method: In Person  Reason/Outcome: Discharge Planning    Requested Home Health Care         Is the patient interested in HHC at discharge?: Yes  Home Health Discipline requested:: Nursing, Physical Therapy  Home Health Agency Name:: [facility]  HHA External Referral Reason (only applicable if external  HHA name selected): Patient has established relationship with provider  Home Health Follow-Up Provider:: PCP  Home Health Services Needed:: Evaluate Functional Status and Safety, Gait/ADL Training, Strengthening/Theraputic Exercises to Improve Function, Other (comment) (medical management)  Homebound Criteria Met:: Uses an Assist Device (i.e. cane, walker, etc)  Supporting Clincal Findings:: Limited Endurance              Would you like to participate in our [facility] service program?  : No - Declined              Pt from home, lives with  and daughter.  Pt uses a walker for ambulation.  Pt IPTA.

## 2025-07-17 NOTE — TELEPHONE ENCOUNTER
7/17/25 - PT IN [facility]  8/1/25 2 WK HFU W/TL SPINE XR [QTOWN]    [facility]  Please schedule 2 week f/u with AP, XR thoracolumbar prior

## 2025-07-17 NOTE — ASSESSMENT & PLAN NOTE
The patient has normal baseline renal function with baseline of around 0.9.  I suspect she has contrast-induced ATN.  What may have placed her at increased risk if she has underlying cirrhosis of those people can have effective arterial blood volume depletion at baseline.  She says she was on an ACE inhibitor she received Toradol and then IV contrast.    Creatinine is up to 1.7 today she feels she is not urinating much.  Electrolytes are acceptable potassium is 3.6.    Given her low urine output and concern for ATN, I am going to initiate diuretic infusion to try and improve urine output.  Start isotonic fluids to support intravascular volume.  Discontinue lisinopril  Start octreotide 100 mcg subcu every 8 hours to try increase effective arterial blood volume  No NSAIDs or further contrast.  Discontinue daily potassium dose and will supplement on an as-needed basis    Hold torsemide, spironolactone, metoprolol for now  Monitor urine output and renal function and hemodynamics

## 2025-07-17 NOTE — ASSESSMENT & PLAN NOTE
Patient has chronic history of hypokalemia, does take potassium supplement  With potassium 2.1, EKG also shows prolonged QT  Monitor on telemetry, continue potassium supplement.  Potassium improved

## 2025-07-17 NOTE — CONSULTS
NEPHROLOGY HOSPITAL CONSULTATION   Bailey Olsen 54 y.o. female MRN: [id number]  Unit/Bed#: -01 Encounter: [id number]        Assessment & Plan  Acute renal failure with tubular necrosis (HCC)    The patient has normal baseline renal function with baseline of around 0.9.  I suspect she has contrast-induced ATN.  What may have placed her at increased risk if she has underlying cirrhosis of those people can have effective arterial blood volume depletion at baseline.  She says she was on an ACE inhibitor she received Toradol and then IV contrast.    Creatinine is up to 1.7 today she feels she is not urinating much.  Electrolytes are acceptable potassium is 3.6.    Given her low urine output and concern for ATN, I am going to initiate diuretic infusion to try and improve urine output.  Start isotonic fluids to support intravascular volume.  Discontinue lisinopril  Start octreotide 100 mcg subcu every 8 hours to try increase effective arterial blood volume  No NSAIDs or further contrast.  Discontinue daily potassium dose and will supplement on an as-needed basis    Hold torsemide, spironolactone, metoprolol for now  Monitor urine output and renal function and hemodynamics      HISTORY OF PRESENT ILLNESS:  Requesting Physician: Ruslan Keyes MD  Reason for Consult: Acute kidney injury    The patient is a 54-year-old female with history of cirrhosis who is out to eat with a friend went into the bathroom and she slid and fell in the bathroom.  She came into the emergency room was found to have some superficial evidence of trauma underwent CAT scan showing a new mild superior endplate compression fracture at T12.  She was treated with Toradol and then received IV contrast with her CAT scan and creatinine is increased to 1.7 and she feels she is not urinating as much.  Rash is here for recommendations.        PAST MEDICAL HISTORY:  Past Medical History[1]    PAST SURGICAL HISTORY:  Past Surgical  History[2]    ALLERGIES:  Allergies[3]    SOCIAL HISTORY:  Social History     Substance and Sexual Activity   Alcohol Use Never     Social History     Substance and Sexual Activity   Drug Use Never     Tobacco Use History[4]    FAMILY HISTORY:  Family History[5]    MEDICATIONS:  Current Medications[6]    REVIEW OF SYSTEMS:  Constitutional: No fever or chills positive weakness.  Respiratory: Negative for cough, shortness of breath and wheezing.   Cardiovascular: Negative for chest pain, palpitations and leg swelling.   Gastrointestinal: Negative for abdominal pain, constipation, diarrhea, nausea and vomiting.   Genitourinary: Says she does not feel she is urinating as much is normal.  Musculoskeletal: Back pain and somatic pain.  Neurological: Negative for focal weakness, dizziness.  Psychiatric/Behavioral: Negative for confusion.  Tearful.    PHYSICAL EXAM:  Current Weight: Weight - Scale: 118 kg (259 lb 0.7 oz)  First Weight: Weight - Scale: 117 kg (258 lb 9.6 oz)  Vitals:    07/17/25 0240 07/17/25 0750 07/17/25 0902 07/17/25 1041   BP: (!) 88/50 99/50 (!) 87/50 (!) 86/36   BP Location: Right arm   Right arm   Pulse:  93 93 92   Resp:  19     Temp:  (!) 97.2 °F (36.2 °C)     TempSrc:       SpO2:  93%     Weight:           Intake/Output Summary (Last 24 hours) at 7/17/2025 1137  Last data filed at 7/17/2025 0830  Gross per 24 hour   Intake 1039.17 ml   Output 100 ml   Net 939.17 ml     Physical Exam  Constitutional:       General: She is not in acute distress.  HENT:      Head: Normocephalic.      Comments: Ecchymosis left knee.     Mouth/Throat:      Mouth: Mucous membranes are dry.     Eyes:      Extraocular Movements: Extraocular movements intact.       Cardiovascular:      Rate and Rhythm: Normal rate and regular rhythm.      Heart sounds:      No gallop.      Comments: No significant edema.  Pulmonary:      Effort: Pulmonary effort is normal. No respiratory distress.      Breath sounds: No wheezing or rales.  "  Abdominal:      General: There is no distension.      Palpations: Abdomen is soft.      Tenderness: There is no abdominal tenderness.     Neurological:      Mental Status: She is alert and oriented to person, place, and time.           Invasive Devices:      Lab Results:   Results from last 7 days   Lab Units 07/17/25  0610 07/17/25  0542 07/17/25  0133 07/16/25  1825 07/16/25  1009   WBC Thousand/uL 3.11*  --   --   --  3.05*   HEMOGLOBIN g/dL 8.2*  --   --   --  8.6*   HEMATOCRIT % 26.0*  --   --   --  27.5*   PLATELETS Thousands/uL 65*  --   --   --  68*   POTASSIUM mmol/L  --  3.6 2.9* 2.7* 2.1*   CHLORIDE mmol/L  --  94* 95* 92* 89*   CO2 mmol/L  --  33* 38* 35* 42*   BUN mg/dL  --  14 12 11 10   CREATININE mg/dL  --  1.76* 1.54* 1.09 0.98   CALCIUM mg/dL  --  7.6* 7.3* 7.6* 7.4*   MAGNESIUM mg/dL  --  1.8*  --   --   --    ALK PHOS U/L  --  171*  --   --  212*   ALT U/L  --  22  --   --  32   AST U/L  --  76*  --   --  72*         Portions of the record may have been created with voice recognition software. Occasional wrong word or \"sound a like\" substitutions may have occurred due to the inherent limitations of voice recognition software. Read the chart carefully and recognize, using context, where substitutions have occurred.If you have any questions, please contact the dictating provider.         [1]   Past Medical History:  Diagnosis Date    Back pain     Carcinoid tumor     neuroendocrine    CHF (congestive heart failure) (HCC)     Cirrhosis of liver (HCC)     Hypertension     Sarcoidosis    [2]   Past Surgical History:  Procedure Laterality Date    CHOLECYSTECTOMY      GASTRIC BYPASS      HERNIA REPAIR      IR KYPHOPLASTY/VERTEBROPLASTY  10/15/2024   [3]   Allergies  Allergen Reactions    Zolpidem GI Intolerance, Other (See Comments) and Hives     Other Reaction(s): Other      Blacks out    Blacks out    Tapentadol Sneezing and Other (See Comments)     Zoned out    Alprazolam Other (See Comments)     " Other Reaction(s): Other      Feels zoned out    Feels zoned out    Amoxicillin-Pot Clavulanate Hives and Nausea Only     Amoxil fine    Dust Mite Extract Sneezing    Morphine Headache, Nausea Only and Vomiting    Tramadol GI Intolerance    Azelastine Rash    Azelastine Hcl Rash    Molds & Smuts Rash     Other Reaction(s): Unknown   [4]   Social History  Tobacco Use   Smoking Status Never   Smokeless Tobacco Never   [5]   Family History  Problem Relation Name Age of Onset    Hypertension Father      Heart attack Father      No Known Problems Sister      No Known Problems Brother      No Known Problems Brother      No Known Problems Brother      No Known Problems Brother      No Known Problems Brother      No Known Problems Daughter     [6]   Current Facility-Administered Medications:     amitriptyline (ELAVIL) tablet 50 mg, 50 mg, Oral, HS, Ruslan Keyes MD, 50 mg at 07/16/25 2119    cyclobenzaprine (FLEXERIL) tablet 10 mg, 10 mg, Oral, TID PRN, Ruslan Keyes MD, 10 mg at 07/17/25 0550    dapsone tablet 50 mg, 50 mg, Oral, Daily, Ruslan Keyes MD, 50 mg at 07/17/25 0851    folic acid (FOLVITE) tablet 1 mg, 1 mg, Oral, Daily, Ruslan Keyes MD, 1 mg at 07/17/25 0851    heparin (porcine) subcutaneous injection 5,000 Units, 5,000 Units, Subcutaneous, Q8H MIGUEL, Ruslan Keyes MD, 5,000 Units at 07/17/25 0550    lactulose (CHRONULAC) oral solution 30 g, 30 g, Oral, 4x Daily, Ruslan Keyes MD, 30 g at 07/17/25 0851    lidocaine (LIDODERM) 5 % patch 1 patch, 1 patch, Topical, Daily, Ruslan Keyes MD    lisinopril (ZESTRIL) tablet 40 mg, 40 mg, Oral, Daily, Ruslan Keyes MD, 40 mg at 07/16/25 1738    magnesium sulfate 2 g/50 mL IVPB (premix) 2 g, 2 g, Intravenous, Once, Ruslan Keyes MD, Last Rate: 25 mL/hr at 07/17/25 0945, 2 g at 07/17/25 0945    metoprolol tartrate (LOPRESSOR) tablet 25 mg, 25 mg, Oral, Q12H MIGUEL, Ruslan Keyes MD    oxyCODONE (ROXICODONE) immediate release tablet 10  mg, 10 mg, Oral, Q6H PRN, Ruslan Keyes MD, 10 mg at 07/17/25 0610    pantoprazole (PROTONIX) EC tablet 40 mg, 40 mg, Oral, Early Morning, Ruslan Keyes MD, 40 mg at 07/17/25 0550    polyethylene glycol (MIRALAX) packet 17 g, 17 g, Oral, Daily, Ruslan Keyes MD, 17 g at 07/17/25 0851    potassium chloride (Klor-Con M20) CR tablet 40 mEq, 40 mEq, Oral, Daily, Rsulan Keyes MD, 40 mEq at 07/17/25 0854    rifaximin (XIFAXAN) tablet 550 mg, 550 mg, Oral, Q12H MIGUEL, Ruslan Keyes MD, 550 mg at 07/17/25 0851    sodium chloride 0.9 % bolus 1,000 mL, 1,000 mL, Intravenous, Once, Ruslan Keyes MD, Last Rate: 500 mL/hr at 07/17/25 1056, 1,000 mL at 07/17/25 1056    spironolactone (ALDACTONE) tablet 25 mg, 25 mg, Oral, Daily, Ruslan Keyes MD, 25 mg at 07/16/25 1738    torsemide (DEMADEX) tablet 20 mg, 20 mg, Oral, Daily, Ruslan Keyes MD, 20 mg at 07/16/25 1738    trimethobenzamide (TIGAN) IM injection 200 mg, 200 mg, Intramuscular, Q6H PRN, Ruslan Keyes MD, 200 mg at 07/16/25 1656    ursodiol (ACTIGALL) capsule 300 mg, 300 mg, Oral, BID, Ruslan Keyes MD, 300 mg at 07/17/25 0851

## 2025-07-17 NOTE — ASSESSMENT & PLAN NOTE
Came after fall  Shows left periorbital bruise, multiple bruises on right lateral chest, right buttock area, left knee  Patient also has T12 fracture, right third finger fracture  Follow-up PT OT recommendation  PainManagement, case management  For vertebral fracture, discussed with neurosurgery, recommending TLSO brace, upright positions,

## 2025-07-17 NOTE — PLAN OF CARE
Problem: PAIN - ADULT  Goal: Verbalizes/displays adequate comfort level or baseline comfort level  Description: Interventions:  - Encourage patient to monitor pain and request assistance  - Assess pain using appropriate pain scale  - Administer analgesics as ordered based on type and severity of pain and evaluate response  - Implement non-pharmacological measures as appropriate and evaluate response  - Consider cultural and social influences on pain and pain management  - Notify physician/advanced practitioner if interventions unsuccessful or patient reports new pain  - Educate patient/family on pain management process including their role and importance of  reporting pain   - Provide non-pharmacologic/complimentary pain relief interventions  Outcome: Progressing     Problem: INFECTION - ADULT  Goal: Absence or prevention of progression during hospitalization  Description: INTERVENTIONS:  - Assess and monitor for signs and symptoms of infection  - Monitor lab/diagnostic results  - Monitor all insertion sites, i.e. indwelling lines, tubes, and drains  - Monitor endotracheal if appropriate and nasal secretions for changes in amount and color  - [place] appropriate cooling/warming therapies per order  - Administer medications as ordered  - Instruct and encourage patient and family to use good hand hygiene technique  - Identify and instruct in appropriate isolation precautions for identified infection/condition  Outcome: Progressing     Problem: SAFETY ADULT  Goal: Patient will remain free of falls  Description: INTERVENTIONS:  - Educate patient/family on patient safety including physical limitations  - Instruct patient to call for assistance with activity   - Consider consulting OT/PT to assist with strengthening/mobility based on AM PAC & JH-HLM score  - Consult OT/PT to assist with strengthening/mobility   - Keep Call bell within reach  - Keep bed low and locked with side rails adjusted as appropriate  - Keep  care items and personal belongings within reach  - Initiate and maintain comfort rounds  - Make Fall Risk Sign visible to staff  - Offer Toileting every 2 Hours, in advance of need  - Initiate/Maintain bed alarm  - Obtain necessary fall risk management equipment walker   - Apply yellow socks and bracelet for high fall risk patients  - Consider moving patient to room near nurses station  Outcome: Progressing  Goal: Maintain or return to baseline ADL function  Description: INTERVENTIONS:  -  Assess patient's ability to carry out ADLs; assess patient's baseline for ADL function and identify physical deficits which impact ability to perform ADLs (bathing, care of mouth/teeth, toileting, grooming, dressing, etc.)  - Assess/evaluate cause of self-care deficits   - Assess range of motion  - Assess patient's mobility; develop plan if impaired  - Assess patient's need for assistive devices and provide as appropriate  - Encourage maximum independence but intervene and supervise when necessary  - Involve family in performance of ADLs  - Assess for home care needs following discharge   - Consider OT consult to assist with ADL evaluation and planning for discharge  - Provide patient education as appropriate  - Monitor functional capacity and physical performance, use of AM PAC & JH-HLM   - Monitor gait, balance and fatigue with ambulation    Outcome: Progressing  Goal: Maintains/Returns to pre admission functional level  Description: INTERVENTIONS:  - Perform AM-PAC 6 Click Basic Mobility/ Daily Activity assessment daily.  - Set and communicate daily mobility goal to care team and patient/family/caregiver.   - Collaborate with rehabilitation services on mobility goals if consulted  - Perform Range of Motion 3 times a day.  - Reposition patient every 2 hours.  - Dangle patient 3 times a day  - Stand patient 3 times a day  - Ambulate patient 3 times a day  - Out of bed to chair 3 times a day   - Out of bed for meals 3 times a  day  - Out of bed for toileting  - Record patient progress and toleration of activity level   Outcome: Progressing     Problem: DISCHARGE PLANNING  Goal: Discharge to home or other facility with appropriate resources  Description: INTERVENTIONS:  - Identify barriers to discharge w/patient and caregiver  - Arrange for needed discharge resources and transportation as appropriate  - Identify discharge learning needs (meds, wound care, etc.)  - Arrange for interpretive services to assist at discharge as needed  - Refer to Case Management Department for coordinating discharge planning if the patient needs post-hospital services based on physician/advanced practitioner order or complex needs related to functional status, cognitive ability, or social support system  Outcome: Progressing     Problem: Knowledge Deficit  Goal: Patient/family/caregiver demonstrates understanding of disease process, treatment plan, medications, and discharge instructions  Description: Complete learning assessment and assess knowledge base.  Interventions:  - Provide teaching at level of understanding  - Provide teaching via preferred learning methods  Outcome: Progressing     Problem: METABOLIC, FLUID AND ELECTROLYTES - ADULT  Goal: Electrolytes maintained within normal limits  Description: INTERVENTIONS:  - Monitor labs and assess patient for signs and symptoms of electrolyte imbalances  - Administer electrolyte replacement as ordered  - Monitor response to electrolyte replacements, including repeat lab results as appropriate  - Instruct patient on fluid and nutrition as appropriate  Outcome: Progressing  Goal: Fluid balance maintained  Description: INTERVENTIONS:  - Monitor labs   - Monitor I/O and WT  - Instruct patient on fluid and nutrition as appropriate  - Assess for signs & symptoms of volume excess or deficit  Outcome: Progressing  Goal: Glucose maintained within target range  Description: INTERVENTIONS:  - Monitor Blood Glucose as  ordered  - Assess for signs and symptoms of hyperglycemia and hypoglycemia  - Administer ordered medications to maintain glucose within target range  - Assess nutritional intake and initiate nutrition service referral as needed  Outcome: Progressing     Problem: Prexisting or High Potential for Compromised Skin Integrity  Goal: Skin integrity is maintained or improved  Description: INTERVENTIONS:  - Identify patients at risk for skin breakdown  - Assess and monitor skin integrity including under and around medical devices   - Assess and monitor nutrition and hydration status  - Monitor labs  - Assess for incontinence   - Turn and reposition patient  - Assist with mobility/ambulation  - Relieve pressure over mariano prominences   - Avoid friction and shearing  - Provide appropriate hygiene as needed including keeping skin clean and dry  - Evaluate need for skin moisturizer/barrier cream  - Collaborate with interdisciplinary team  - Patient/family teaching  - Consider wound care consult    Assess:  - Review Royce scale daily  - Clean and moisturize skin every 12  - Inspect skin when repositioning, toileting, and assisting with ADLS  - Assess under medical devices such as incontinence pad every 2  - Assess extremities for adequate circulation and sensation     Bed Management:  - Have minimal linens on bed & keep smooth, unwrinkled  - Change linens as needed when moist or perspiring  - Avoid sitting or lying in one position for more than 2 hours while in bed?Keep HOB at 30 degrees   - Toileting:  - Offer bedside commode  - Assess for incontinence every 2  - Use incontinent care products after each incontinent episode such as cream    Activity:  - Mobilize patient 3 times a day  - Encourage activity and walks on unit  - Encourage or provide ROM exercises   - Turn and reposition patient every 2 Hours  - Use appropriate equipment to lift or move patient in bed  - Instruct/ Assist with weight shifting every 30 when out of  bed in chair  - Consider limitation of chair time 2 hour intervals    Skin Care:  - Avoid use of baby powder, tape, friction and shearing, hot water or constrictive clothing  - Relieve pressure over bony prominences using cushion  - Do not massage red bony areas    Next Steps:  - Teach patient strategies to minimize risks such as proper nutrition  - Consider consults to  interdisciplinary teams such as nutrition  Outcome: Progressing

## 2025-07-17 NOTE — OCCUPATIONAL THERAPY NOTE
Occupational Therapy Cancel Note     Patient Name: Bailey Olsen  Today's Date: 7/17/2025  Problem List  Principal Problem:    Hypokalemia  Active Problems:    Liver cirrhosis secondary to HYATT (nonalcoholic steatohepatitis) (HCC)    Pancytopenia (HCC)    Fall    Morbid obesity with BMI of 40.0-44.9, adult (HCC)    Chronic, continuous use of opioids    Closed fracture of phalanx of right middle finger    Closed fracture of twelfth thoracic vertebra (HCC)    Acute renal failure with tubular necrosis (HCC)    ATN (acute tubular necrosis) (HCC)            07/17/25 1405   OT Last Visit   OT Visit Date 07/17/25   Note Type   Note type Evaluation;Cancelled Session   Cancel Reasons Other         Chart reviewed. Pt has TLSO brace from a recent T12 fracture that she is ordered to wear when OOB or when the HOB is elevated above 45 degrees. The brace is currently not in the hospital, but nursing reports the family plans to bring in the brace tonight or tomorrow. Will continue to follow case and address as appropriate and as time allows.    Cristian Ramachandran, OTR/L

## 2025-07-17 NOTE — ASSESSMENT & PLAN NOTE
There is a new mild superior endplate compression fracture at T12 compared to May 2025. Status post kyphoplasty at L1. There is a healing fracture of the right anterior fourth rib.   Appreciate neurosurgery recommendation, upright position, TLSO brace.    Has recent history of fall  Complaining of lower back pain  Continue pain management  No neurodeficit  Follow-up PT OT, neurosurgery recommended

## 2025-07-17 NOTE — PROGRESS NOTES
Progress Note - Hospitalist   Name: Bailey Olsen 54 y.o. female I MRN: [id number]  Unit/Bed#: -01 I Date of Admission: 7/16/2025   Date of Service: 7/17/2025 I Hospital Day: 1    Assessment & Plan  Acute renal failure with tubular necrosis (HCC)  Baseline creatinine around 0.9  Creatinine is 1.7  Patient did not make enough urine  Suspect contrast induced ATN  Also running hypotensive, received IV hydration, albumin therapy, midodrine  After discussion has not, nephrology recommending to place patient on Lasix drip-keep monitoring patient on telemetry, check BMP every 12 hours will remain in the Lasix drip  Critical care colic however this patient  Hypokalemia  Patient has chronic history of hypokalemia, does take potassium supplement  With potassium 2.1, EKG also shows prolonged QT  Monitor on telemetry, continue potassium supplement.  Potassium improved  Fall  Came after fall  Shows left periorbital bruise, multiple bruises on right lateral chest, right buttock area, left knee  Patient also has T12 fracture, right third finger fracture  Follow-up PT OT recommendation  PainManagement, case management  For vertebral fracture, discussed with neurosurgery, recommending TLSO brace, upright positions,  Closed fracture of phalanx of right middle finger  Secondary to fall, imaging reviewed  Continue pain management, embolization, can use finger splint  Follow orthopedic recommendation  Continue diane tape per orthopedic recommendation  Closed fracture of twelfth thoracic vertebra (HCC)  There is a new mild superior endplate compression fracture at T12 compared to May 2025. Status post kyphoplasty at L1. There is a healing fracture of the right anterior fourth rib.   Appreciate neurosurgery recommendation, upright position, TLSO brace.    Has recent history of fall  Complaining of lower back pain  Continue pain management  No neurodeficit  Follow-up PT OT, neurosurgery recommended  Liver cirrhosis secondary to HYATT  (nonalcoholic steatohepatitis) (HCC)  Continue conservative management with home medications.  Pancytopenia (HCC)  Secondary to liver cirrhosis, continue to monitor, no active bleeding noted  Morbid obesity with BMI of 40.0-44.9, adult (HCC)  Lifestyle modification.  Chronic, continuous use of opioids  Will use narcotics, continue to monitor.    VTE Pharmacologic Prophylaxis: VTE Score: 5 High Risk (Score >/= 5) - Pharmacological DVT Prophylaxis Ordered: heparin. Sequential Compression Devices Ordered.    Mobility:   Basic Mobility Inpatient Raw Score: 19  JH-HLM Goal: 6: Walk 10 steps or more  JH-HLM Achieved: 6: Walk 10 steps or more  JH-HLM Goal achieved. Continue to encourage appropriate mobility.    Patient Centered Rounds: I performed bedside rounds with nursing staff today.   Discussions with Specialists or Other Care Team Provider: Nephrology    Education and Discussions with Family / Patient: Updated  (sister) at bedside.    Current Length of Stay: 1 day(s)  Current Patient Status: Inpatient   Certification Statement: The patient will continue to require additional inpatient hospital stay due to KISHORE receiving Lasix drip  Discharge Plan: Anticipate discharge in >72 hrs to to be determined    Code Status: Level 1 - Full Code    Subjective   Seen and evaluated during the event.  Patient laying on the bed, emotional after during that she has broken vertebra.  Also having some pain.    Objective :  Temp:  [97 °F (36.1 °C)-97.3 °F (36.3 °C)] 97 °F (36.1 °C)  HR:  [] 117  BP: ()/(36-82) 98/58  Resp:  [16-19] 18  SpO2:  [91 %-96 %] 91 %  O2 Device: None (Room air)    Body mass index is 41.81 kg/m².     Input and Output Summary (last 24 hours):     Intake/Output Summary (Last 24 hours) at 7/17/2025 1534  Last data filed at 7/17/2025 1235  Gross per 24 hour   Intake 1179.17 ml   Output 100 ml   Net 1079.17 ml       Physical Exam  Vitals and nursing note reviewed.   Constitutional:        Appearance: Normal appearance. She is obese. She is not ill-appearing or diaphoretic.   HENT:      Mouth/Throat:      Mouth: Mucous membranes are moist.      Pharynx: No oropharyngeal exudate or posterior oropharyngeal erythema.     Eyes:      Pupils: Pupils are equal, round, and reactive to light.       Cardiovascular:      Rate and Rhythm: Normal rate.      Heart sounds:      No friction rub. No gallop.   Pulmonary:      Effort: No respiratory distress.      Breath sounds: No stridor. No wheezing or rhonchi.   Abdominal:      General: There is no distension.      Palpations: There is no mass.      Hernia: No hernia is present.     Musculoskeletal:      Comments: Multiple bruises in different parts of the body, left orbit, right lateral chest, left knee     Neurological:      Mental Status: She is alert and oriented to person, place, and time.      Cranial Nerves: No cranial nerve deficit.      Sensory: No sensory deficit.      Motor: No weakness.      Coordination: Coordination normal.         Lines/Drains:        Telemetry:  Telemetry Orders (From admission, onward)               24 Hour Telemetry Monitoring  Continuous x 24 Hours (Telem)        Question:  Reason for 24 Hour Telemetry  Answer:  Metabolic/electrolyte disturbance with high probability of dysrhythmia. K level <3 or >6 OR KCL infusion >10mEq/hr                     Telemetry Reviewed: Normal Sinus Rhythm  Indication for Continued Telemetry Use: Acute CHF on >200 mg lasix/day or equivalent dose or with new reduced EF.                Lab Results: I have reviewed the following results:   Results from last 7 days   Lab Units 07/17/25  0610   WBC Thousand/uL 3.11*   HEMOGLOBIN g/dL 8.2*   HEMATOCRIT % 26.0*   PLATELETS Thousands/uL 65*   SEGS PCT % 59   LYMPHO PCT % 31   MONO PCT % 7   EOS PCT % 2     Results from last 7 days   Lab Units 07/17/25  0542   SODIUM mmol/L 136   POTASSIUM mmol/L 3.6   CHLORIDE mmol/L 94*   CO2 mmol/L 33*   BUN mg/dL 14    CREATININE mg/dL 1.76*   ANION GAP mmol/L 9   CALCIUM mg/dL 7.6*   ALBUMIN g/dL 2.7*   TOTAL BILIRUBIN mg/dL 2.36*   ALK PHOS U/L 171*   ALT U/L 22   AST U/L 76*   GLUCOSE RANDOM mg/dL 76     Results from last 7 days   Lab Units 07/16/25  1009   INR  1.95*                   Recent Cultures (last 7 days):         Imaging Results Review: No pertinent imaging studies reviewed.  Other Study Results Review: No additional pertinent studies reviewed.    Last 24 Hours Medication List:     Current Facility-Administered Medications:     amitriptyline (ELAVIL) tablet 50 mg, HS    cyclobenzaprine (FLEXERIL) tablet 10 mg, TID PRN    dapsone tablet 50 mg, Daily    folic acid (FOLVITE) tablet 1 mg, Daily    furosemide (LASIX) 500 mg infusion 50 mL, Continuous, Last Rate: 20 mg/hr (07/17/25 1219)    heparin (porcine) subcutaneous injection 5,000 Units, Q8H MIGUEL    lactulose (CHRONULAC) oral solution 30 g, 4x Daily    lidocaine (LIDODERM) 5 % patch 1 patch, Daily    [Held by provider] metoprolol tartrate (LOPRESSOR) tablet 25 mg, Q12H MIGUEL    octreotide (SandoSTATIN) injection 100 mcg, Q8H MIGUEL    oxyCODONE (ROXICODONE) immediate release tablet 10 mg, Q6H PRN    pantoprazole (PROTONIX) EC tablet 40 mg, Early Morning    polyethylene glycol (MIRALAX) packet 17 g, Daily    rifaximin (XIFAXAN) tablet 550 mg, Q12H MIGUEL    trimethobenzamide (TIGAN) IM injection 200 mg, Q6H PRN    ursodiol (ACTIGALL) capsule 300 mg, BID    Administrative Statements   Today, Patient Was Seen By: Ruslan Keyes MD      **Please Note: This note may have been constructed using a voice recognition system.**

## 2025-07-17 NOTE — PLAN OF CARE
Problem: PAIN - ADULT  Goal: Verbalizes/displays adequate comfort level or baseline comfort level  Description: Interventions:  - Encourage patient to monitor pain and request assistance  - Assess pain using appropriate pain scale  - Administer analgesics as ordered based on type and severity of pain and evaluate response  - Implement non-pharmacological measures as appropriate and evaluate response  - Consider cultural and social influences on pain and pain management  - Notify physician/advanced practitioner if interventions unsuccessful or patient reports new pain  - Educate patient/family on pain management process including their role and importance of  reporting pain   - Provide non-pharmacologic/complimentary pain relief interventions  Outcome: Progressing     Problem: INFECTION - ADULT  Goal: Absence or prevention of progression during hospitalization  Description: INTERVENTIONS:  - Assess and monitor for signs and symptoms of infection  - Monitor lab/diagnostic results  - Monitor all insertion sites, i.e. indwelling lines, tubes, and drains  - Monitor endotracheal if appropriate and nasal secretions for changes in amount and color  - [place] appropriate cooling/warming therapies per order  - Administer medications as ordered  - Instruct and encourage patient and family to use good hand hygiene technique  - Identify and instruct in appropriate isolation precautions for identified infection/condition  Outcome: Progressing     Problem: SAFETY ADULT  Goal: Patient will remain free of falls  Description: INTERVENTIONS:  - Educate patient/family on patient safety including physical limitations  - Instruct patient to call for assistance with activity   - Consider consulting OT/PT to assist with strengthening/mobility based on AM PAC & JH-HLM score  - Consult OT/PT to assist with strengthening/mobility   - Keep Call bell within reach  - Keep bed low and locked with side rails adjusted as appropriate  - Keep  care items and personal belongings within reach  - Initiate and maintain comfort rounds  - Make Fall Risk Sign visible to staff  - Offer Toileting every 2 Hours, in advance of need  - Initiate/Maintain alarm  - Obtain necessary fall risk management equipment  - Apply yellow socks and bracelet for high fall risk patients  - Consider moving patient to room near nurses station  Outcome: Progressing  Goal: Maintain or return to baseline ADL function  Description: INTERVENTIONS:  -  Assess patient's ability to carry out ADLs; assess patient's baseline for ADL function and identify physical deficits which impact ability to perform ADLs (bathing, care of mouth/teeth, toileting, grooming, dressing, etc.)  - Assess/evaluate cause of self-care deficits   - Assess range of motion  - Assess patient's mobility; develop plan if impaired  - Assess patient's need for assistive devices and provide as appropriate  - Encourage maximum independence but intervene and supervise when necessary  - Involve family in performance of ADLs  - Assess for home care needs following discharge   - Consider OT consult to assist with ADL evaluation and planning for discharge  - Provide patient education as appropriate  - Monitor functional capacity and physical performance, use of AM PAC & JH-HLM   - Monitor gait, balance and fatigue with ambulation    Outcome: Progressing  Goal: Maintains/Returns to pre admission functional level  Description: INTERVENTIONS:  - Perform AM-PAC 6 Click Basic Mobility/ Daily Activity assessment daily.  - Set and communicate daily mobility goal to care team and patient/family/caregiver.   - Collaborate with rehabilitation services on mobility goals if consulted  - Perform Range of Motion - times a day.  - Reposition patient every - hours.  - Dangle patient - times a day  - Stand patient - times a day  - Ambulate patient - times a day  - Out of bed to chair - times a day   - Out of bed for meals - times a day  - Out of  bed for toileting  - Record patient progress and toleration of activity level   Outcome: Progressing     Problem: DISCHARGE PLANNING  Goal: Discharge to home or other facility with appropriate resources  Description: INTERVENTIONS:  - Identify barriers to discharge w/patient and caregiver  - Arrange for needed discharge resources and transportation as appropriate  - Identify discharge learning needs (meds, wound care, etc.)  - Arrange for interpretive services to assist at discharge as needed  - Refer to Case Management Department for coordinating discharge planning if the patient needs post-hospital services based on physician/advanced practitioner order or complex needs related to functional status, cognitive ability, or social support system  Outcome: Progressing     Problem: Knowledge Deficit  Goal: Patient/family/caregiver demonstrates understanding of disease process, treatment plan, medications, and discharge instructions  Description: Complete learning assessment and assess knowledge base.  Interventions:  - Provide teaching at level of understanding  - Provide teaching via preferred learning methods  Outcome: Progressing     Problem: METABOLIC, FLUID AND ELECTROLYTES - ADULT  Goal: Electrolytes maintained within normal limits  Description: INTERVENTIONS:  - Monitor labs and assess patient for signs and symptoms of electrolyte imbalances  - Administer electrolyte replacement as ordered  - Monitor response to electrolyte replacements, including repeat lab results as appropriate  - Instruct patient on fluid and nutrition as appropriate  Outcome: Progressing  Goal: Fluid balance maintained  Description: INTERVENTIONS:  - Monitor labs   - Monitor I/O and WT  - Instruct patient on fluid and nutrition as appropriate  - Assess for signs & symptoms of volume excess or deficit  Outcome: Progressing  Goal: Glucose maintained within target range  Description: INTERVENTIONS:  - Monitor Blood Glucose as ordered  - Assess  for signs and symptoms of hyperglycemia and hypoglycemia  - Administer ordered medications to maintain glucose within target range  - Assess nutritional intake and initiate nutrition service referral as needed  Outcome: Progressing     Problem: Prexisting or High Potential for Compromised Skin Integrity  Goal: Skin integrity is maintained or improved  Description: INTERVENTIONS:  - Identify patients at risk for skin breakdown  - Assess and monitor skin integrity including under and around medical devices   - Assess and monitor nutrition and hydration status  - Monitor labs  - Assess for incontinence   - Turn and reposition patient  - Assist with mobility/ambulation  - Relieve pressure over mariano prominences   - Avoid friction and shearing  - Provide appropriate hygiene as needed including keeping skin clean and dry  - Evaluate need for skin moisturizer/barrier cream  - Collaborate with interdisciplinary team  - Patient/family teaching  - Consider wound care consult    Assess:  - Review Royce scale daily  - Clean and moisturize skin every -  - Inspect skin when repositioning, toileting, and assisting with ADLS  - Assess under medical devices such as - every -  - Assess extremities for adequate circulation and sensation     Bed Management:  - Have minimal linens on bed & keep smooth, unwrinkled  - Change linens as needed when moist or perspiring  - Avoid sitting or lying in one position for more than - hours while in bed  -Keep HOB at - degrees   - Toileting:  - Offer bedside commode  - Assess for incontinence every -  - Use incontinent care products after each incontinent episode such as -    Activity:  - Mobilize patient - times a day  - Encourage activity and walks on unit  - Encourage or provide ROM exercises   - Turn and reposition patient every - Hours  - Use appropriate equipment to lift or move patient in bed  - Instruct/ Assist with weight shifting every - when out of bed in chair  - Consider limitation of  chair time - hour intervals    Skin Care:  - Avoid use of baby powder, tape, friction and shearing, hot water or constrictive clothing  - Relieve pressure over bony prominences using -  - Do not massage red bony areas    Next Steps:  - Teach patient strategies to minimize risks such as -  - Consider consults to  interdisciplinary teams such as -  Outcome: Progressing

## 2025-07-17 NOTE — ASSESSMENT & PLAN NOTE
Secondary to fall, imaging reviewed  Continue pain management, embolization, can use finger splint  Follow orthopedic recommendation  Continue diane tape per orthopedic recommendation

## 2025-07-18 PROBLEM — R34 OLIGURIA: Status: ACTIVE | Noted: 2025-07-18

## 2025-07-18 LAB
ALBUMIN SERPL BCG-MCNC: 3.6 G/DL (ref 3.5–5)
ALP SERPL-CCNC: 169 U/L (ref 34–104)
ALT SERPL W P-5'-P-CCNC: 21 U/L (ref 7–52)
ANION GAP SERPL CALCULATED.3IONS-SCNC: 11 MMOL/L (ref 4–13)
ANION GAP SERPL CALCULATED.3IONS-SCNC: 9 MMOL/L (ref 4–13)
ANION GAP SERPL CALCULATED.3IONS-SCNC: 9 MMOL/L (ref 4–13)
AST SERPL W P-5'-P-CCNC: 72 U/L (ref 13–39)
BASOPHILS # BLD AUTO: 0.03 THOUSANDS/ÂΜL (ref 0–0.1)
BASOPHILS NFR BLD AUTO: 1 % (ref 0–1)
BILIRUB SERPL-MCNC: 2.82 MG/DL (ref 0.2–1)
BUN SERPL-MCNC: 17 MG/DL (ref 5–25)
BUN SERPL-MCNC: 18 MG/DL (ref 5–25)
BUN SERPL-MCNC: 19 MG/DL (ref 5–25)
CALCIUM SERPL-MCNC: 8.7 MG/DL (ref 8.4–10.2)
CALCIUM SERPL-MCNC: 9.2 MG/DL (ref 8.4–10.2)
CALCIUM SERPL-MCNC: 9.3 MG/DL (ref 8.4–10.2)
CHLORIDE SERPL-SCNC: 94 MMOL/L (ref 96–108)
CHLORIDE SERPL-SCNC: 95 MMOL/L (ref 96–108)
CHLORIDE SERPL-SCNC: 95 MMOL/L (ref 96–108)
CO2 SERPL-SCNC: 31 MMOL/L (ref 21–32)
CO2 SERPL-SCNC: 35 MMOL/L (ref 21–32)
CO2 SERPL-SCNC: 36 MMOL/L (ref 21–32)
CREAT SERPL-MCNC: 1.82 MG/DL (ref 0.6–1.3)
CREAT SERPL-MCNC: 2.48 MG/DL (ref 0.6–1.3)
CREAT SERPL-MCNC: 2.62 MG/DL (ref 0.6–1.3)
EOSINOPHIL # BLD AUTO: 0 THOUSAND/ÂΜL (ref 0–0.61)
EOSINOPHIL NFR BLD AUTO: 0 % (ref 0–6)
ERYTHROCYTE [DISTWIDTH] IN BLOOD BY AUTOMATED COUNT: 19.2 % (ref 11.6–15.1)
GFR SERPL CREATININE-BSD FRML MDRD: 19 ML/MIN/1.73SQ M
GFR SERPL CREATININE-BSD FRML MDRD: 21 ML/MIN/1.73SQ M
GFR SERPL CREATININE-BSD FRML MDRD: 30 ML/MIN/1.73SQ M
GLUCOSE SERPL-MCNC: 118 MG/DL (ref 65–140)
GLUCOSE SERPL-MCNC: 154 MG/DL (ref 65–140)
GLUCOSE SERPL-MCNC: 99 MG/DL (ref 65–140)
HCT VFR BLD AUTO: 27 % (ref 34.8–46.1)
HGB BLD-MCNC: 8.3 G/DL (ref 11.5–15.4)
IMM GRANULOCYTES # BLD AUTO: 0 THOUSAND/UL (ref 0–0.2)
IMM GRANULOCYTES NFR BLD AUTO: 0 % (ref 0–2)
LYMPHOCYTES # BLD AUTO: 0.73 THOUSANDS/ÂΜL (ref 0.6–4.47)
LYMPHOCYTES NFR BLD AUTO: 32 % (ref 14–44)
MCH RBC QN AUTO: 30.5 PG (ref 26.8–34.3)
MCHC RBC AUTO-ENTMCNC: 30.7 G/DL (ref 31.4–37.4)
MCV RBC AUTO: 99 FL (ref 82–98)
MONOCYTES # BLD AUTO: 0.18 THOUSAND/ÂΜL (ref 0.17–1.22)
MONOCYTES NFR BLD AUTO: 8 % (ref 4–12)
NEUTROPHILS # BLD AUTO: 1.31 THOUSANDS/ÂΜL (ref 1.85–7.62)
NEUTS SEG NFR BLD AUTO: 59 % (ref 43–75)
NRBC BLD AUTO-RTO: 0 /100 WBCS
PLATELET # BLD AUTO: 77 THOUSANDS/UL (ref 149–390)
PMV BLD AUTO: 11.6 FL (ref 8.9–12.7)
POTASSIUM SERPL-SCNC: 3.1 MMOL/L (ref 3.5–5.3)
POTASSIUM SERPL-SCNC: 3.8 MMOL/L (ref 3.5–5.3)
POTASSIUM SERPL-SCNC: 3.9 MMOL/L (ref 3.5–5.3)
PROT SERPL-MCNC: 6.7 G/DL (ref 6.4–8.4)
RBC # BLD AUTO: 2.72 MILLION/UL (ref 3.81–5.12)
SODIUM SERPL-SCNC: 137 MMOL/L (ref 135–147)
SODIUM SERPL-SCNC: 139 MMOL/L (ref 135–147)
SODIUM SERPL-SCNC: 139 MMOL/L (ref 135–147)
WBC # BLD AUTO: 2.25 THOUSAND/UL (ref 4.31–10.16)

## 2025-07-18 PROCEDURE — 85025 COMPLETE CBC W/AUTO DIFF WBC: CPT | Performed by: FAMILY MEDICINE

## 2025-07-18 PROCEDURE — 80048 BASIC METABOLIC PNL TOTAL CA: CPT | Performed by: FAMILY MEDICINE

## 2025-07-18 PROCEDURE — 97166 OT EVAL MOD COMPLEX 45 MIN: CPT

## 2025-07-18 PROCEDURE — 99232 SBSQ HOSP IP/OBS MODERATE 35: CPT | Performed by: INTERNAL MEDICINE

## 2025-07-18 PROCEDURE — 97163 PT EVAL HIGH COMPLEX 45 MIN: CPT

## 2025-07-18 PROCEDURE — 99232 SBSQ HOSP IP/OBS MODERATE 35: CPT | Performed by: FAMILY MEDICINE

## 2025-07-18 PROCEDURE — 80053 COMPREHEN METABOLIC PANEL: CPT | Performed by: FAMILY MEDICINE

## 2025-07-18 PROCEDURE — 97535 SELF CARE MNGMENT TRAINING: CPT

## 2025-07-18 PROCEDURE — 97116 GAIT TRAINING THERAPY: CPT

## 2025-07-18 RX ORDER — OXYCODONE HYDROCHLORIDE 10 MG/1
10 TABLET ORAL ONCE
Refills: 0 | Status: COMPLETED | OUTPATIENT
Start: 2025-07-18 | End: 2025-07-18

## 2025-07-18 RX ADMIN — OXYCODONE HYDROCHLORIDE 10 MG: 10 TABLET ORAL at 19:10

## 2025-07-18 RX ADMIN — PANTOPRAZOLE SODIUM 40 MG: 40 TABLET, DELAYED RELEASE ORAL at 05:58

## 2025-07-18 RX ADMIN — RIFAXIMIN 550 MG: 550 TABLET ORAL at 08:12

## 2025-07-18 RX ADMIN — HEPARIN SODIUM 5000 UNITS: 5000 INJECTION INTRAVENOUS; SUBCUTANEOUS at 21:05

## 2025-07-18 RX ADMIN — RIFAXIMIN 550 MG: 550 TABLET ORAL at 21:05

## 2025-07-18 RX ADMIN — LACTULOSE 30 G: 10 SOLUTION ORAL at 18:15

## 2025-07-18 RX ADMIN — DAPSONE 50 MG: 25 TABLET ORAL at 08:15

## 2025-07-18 RX ADMIN — OCTREOTIDE ACETATE 100 MCG: 100 INJECTION, SOLUTION INTRAVENOUS; SUBCUTANEOUS at 05:58

## 2025-07-18 RX ADMIN — Medication 40 MG/HR: at 03:16

## 2025-07-18 RX ADMIN — LACTULOSE 30 G: 10 SOLUTION ORAL at 12:07

## 2025-07-18 RX ADMIN — TRIMETHOBENZAMIDE HYDROCHLORIDE 200 MG: 100 INJECTION INTRAMUSCULAR at 15:36

## 2025-07-18 RX ADMIN — OCTREOTIDE ACETATE 100 MCG: 100 INJECTION, SOLUTION INTRAVENOUS; SUBCUTANEOUS at 14:15

## 2025-07-18 RX ADMIN — LACTULOSE 30 G: 10 SOLUTION ORAL at 21:05

## 2025-07-18 RX ADMIN — HEPARIN SODIUM 5000 UNITS: 5000 INJECTION INTRAVENOUS; SUBCUTANEOUS at 14:15

## 2025-07-18 RX ADMIN — OXYCODONE HYDROCHLORIDE 10 MG: 10 TABLET ORAL at 06:16

## 2025-07-18 RX ADMIN — LACTULOSE 30 G: 10 SOLUTION ORAL at 08:12

## 2025-07-18 RX ADMIN — Medication 40 MG/HR: at 15:37

## 2025-07-18 RX ADMIN — URSODIOL 300 MG: 300 CAPSULE ORAL at 18:15

## 2025-07-18 RX ADMIN — HEPARIN SODIUM 5000 UNITS: 5000 INJECTION INTRAVENOUS; SUBCUTANEOUS at 05:58

## 2025-07-18 RX ADMIN — AMITRIPTYLINE HYDROCHLORIDE 50 MG: 25 TABLET, FILM COATED ORAL at 21:05

## 2025-07-18 RX ADMIN — OCTREOTIDE ACETATE 100 MCG: 100 INJECTION, SOLUTION INTRAVENOUS; SUBCUTANEOUS at 21:12

## 2025-07-18 RX ADMIN — FOLIC ACID 1 MG: 1 TABLET ORAL at 08:12

## 2025-07-18 RX ADMIN — OXYCODONE HYDROCHLORIDE 10 MG: 10 TABLET ORAL at 14:19

## 2025-07-18 RX ADMIN — CYCLOBENZAPRINE 10 MG: 10 TABLET, FILM COATED ORAL at 15:35

## 2025-07-18 RX ADMIN — POLYETHYLENE GLYCOL 3350 17 G: 17 POWDER, FOR SOLUTION ORAL at 08:11

## 2025-07-18 RX ADMIN — URSODIOL 300 MG: 300 CAPSULE ORAL at 08:12

## 2025-07-18 NOTE — PLAN OF CARE
Problem: PHYSICAL THERAPY ADULT  Goal: Performs mobility at highest level of function for planned discharge setting.  See evaluation for individualized goals.  Description: Treatment/Interventions: Functional transfer training, LE strengthening/ROM, Elevations, Therapeutic exercise, Endurance training, Patient/family training, Equipment eval/education, Bed mobility, Gait training, Compensatory technique education, Spoke to nursing, OT          See flowsheet documentation for full assessment, interventions and recommendations.  Note: Prognosis: Good  Problem List: Decreased strength, Decreased endurance, Impaired balance, Decreased mobility, Impaired judgement, Decreased safety awareness, Obesity, Pain  Assessment: Pt is a 54 y.o. female seen for PT evaluation s/p admission to [facility] on 7/16/2025 with Hypokalemia.  Order placed for PT services.  Upon evaluation: Pt is presenting with impaired functional mobility due to pain, decreased strength, decreased endurance, impaired balance, gait deviations, decreased safety awareness, impaired judgment, and fall risk requiring  supervision assistance for bed mobility and transfers and min assistance for ambulation with RW . Pt's clinical presentation is currently unstable/unpredictable given the functional mobility deficits above coupled with fall risks as indicated by AM-PAC 6-Clicks: 18/24 as well as hx of falls, impulsivity, impaired balance, polypharmacy, impaired judgement, decreased safety awareness, and obesity and combined with medical complications of hypertension , pain impacting overall mobility status, need for input for mobility technique/safety, abnormal CBC, abnormal H&H, abnormal WBCs, and abnormal renal lab values.  Pt's PMHx and comorbidities that may affect physical performance and progress include: CHF, CKD, Fibromyalgia, HTN, obesity, cancer history and/or treatment, and hx of liver cirrhosis . Personal factors affecting pt at  time of IE include: step(s) to enter environment, multi-level environment, limited home support, past experience, behavioral pattern, inability to perform IADLs, inability to navigate level surfaces without external assistance, limited insight into impairments, and recent fall(s)/fall history. Pt will benefit from continued skilled PT services to address deficits as defined above and to maximize level of functional mobility to facilitate return toward PLOF and improved QOL. From PT/mobility standpoint, recommendation at time of d/c would be Level II (Moderate Resource Intensity pending progress in order to reduce fall risk and maximize pt's functional independence and consistency with mobility in order to facilitate return to PLOF.  Recommend trial with walker next 1-2 sessions and ther ex next 1-2 sessions.  Barriers to Discharge: Decreased caregiver support  Barriers to Discharge Comments: pt reports dtr and spouse work during the day  Rehab Resource Intensity Level, PT: II (Moderate Resource Intensity)    See flowsheet documentation for full assessment.

## 2025-07-18 NOTE — PLAN OF CARE
Problem: PHYSICAL THERAPY ADULT  Goal: Performs mobility at highest level of function for planned discharge setting.  See evaluation for individualized goals.  Description: Treatment/Interventions: Functional transfer training, LE strengthening/ROM, Elevations, Therapeutic exercise, Endurance training, Patient/family training, Equipment eval/education, Bed mobility, Gait training, Compensatory technique education, Spoke to nursing, OT          See flowsheet documentation for full assessment, interventions and recommendations.  7/18/2025 1625 by Payal Castorena, PT  Note: Prognosis: Good  Problem List: Decreased strength, Decreased endurance, Impaired balance, Decreased mobility, Impaired judgement, Decreased safety awareness, Obesity, Pain  Pt seen for PT treatment session this date with interventions consisting of gait training w/ emphasis on improving pt's ability to ambulate level surfaces x 120' with min A provided by therapist with RW. Pt agreeable to PT treatment session upon arrival, pt found seated OOB in recliner, in no apparent distress. In comparison to previous session, pt with improvements in pt ambulating increased distance this session, pt limited by fatigue. Post session: pt returned back to recliner, chair alarm engaged, all needs in reach, and RN notified of session findings/recommendations Continue to recommend Level II Resource Intensity at time of d/c in order to maximize pt's functional independence and safety w/ mobility. Pt continues to be functioning below baseline level, and remains limited 2* factors listed above and including decreased strength, balance, mobility, and activity tolerance. PT will continue to see pt while here in order to address the deficits listed above and provide interventions consistent w/ POC in effort to achieve STGs.  Barriers to Discharge: Decreased caregiver support  Barriers to Discharge Comments: pt reports dtr and spouse work during the day  Rehab Resource  Intensity Level, PT: II (Moderate Resource Intensity)    See flowsheet documentation for full assessment.

## 2025-07-18 NOTE — PLAN OF CARE
Problem: PAIN - ADULT  Goal: Verbalizes/displays adequate comfort level or baseline comfort level  Description: Interventions:  - Encourage patient to monitor pain and request assistance  - Assess pain using appropriate pain scale  - Administer analgesics as ordered based on type and severity of pain and evaluate response  - Implement non-pharmacological measures as appropriate and evaluate response  - Consider cultural and social influences on pain and pain management  - Notify physician/advanced practitioner if interventions unsuccessful or patient reports new pain  - Educate patient/family on pain management process including their role and importance of  reporting pain   - Provide non-pharmacologic/complimentary pain relief interventions  Outcome: Progressing     Problem: INFECTION - ADULT  Goal: Absence or prevention of progression during hospitalization  Description: INTERVENTIONS:  - Assess and monitor for signs and symptoms of infection  - Monitor lab/diagnostic results  - Monitor all insertion sites, i.e. indwelling lines, tubes, and drains  - Monitor endotracheal if appropriate and nasal secretions for changes in amount and color  - [place] appropriate cooling/warming therapies per order  - Administer medications as ordered  - Instruct and encourage patient and family to use good hand hygiene technique  - Identify and instruct in appropriate isolation precautions for identified infection/condition  Outcome: Progressing     Problem: SAFETY ADULT  Goal: Patient will remain free of falls  Description: INTERVENTIONS:  - Educate patient/family on patient safety including physical limitations  - Instruct patient to call for assistance with activity   - Consider consulting OT/PT to assist with strengthening/mobility based on AM PAC & JH-HLM score  - Consult OT/PT to assist with strengthening/mobility   - Keep Call bell within reach  - Keep bed low and locked with side rails adjusted as appropriate  - Keep  care items and personal belongings within reach  - Initiate and maintain comfort rounds  - Make Fall Risk Sign visible to staff  - Apply yellow socks and bracelet for high fall risk patients  - Consider moving patient to room near nurses station  Outcome: Progressing  Goal: Maintain or return to baseline ADL function  Description: INTERVENTIONS:  -  Assess patient's ability to carry out ADLs; assess patient's baseline for ADL function and identify physical deficits which impact ability to perform ADLs (bathing, care of mouth/teeth, toileting, grooming, dressing, etc.)  - Assess/evaluate cause of self-care deficits   - Assess range of motion  - Assess patient's mobility; develop plan if impaired  - Assess patient's need for assistive devices and provide as appropriate  - Encourage maximum independence but intervene and supervise when necessary  - Involve family in performance of ADLs  - Assess for home care needs following discharge   - Consider OT consult to assist with ADL evaluation and planning for discharge  - Provide patient education as appropriate  - Monitor functional capacity and physical performance, use of AM PAC & JH-HLM   - Monitor gait, balance and fatigue with ambulation    Outcome: Progressing  Goal: Maintains/Returns to pre admission functional level  Description: INTERVENTIONS:  - Perform AM-PAC 6 Click Basic Mobility/ Daily Activity assessment daily.  - Set and communicate daily mobility goal to care team and patient/family/caregiver.   - Collaborate with rehabilitation services on mobility goals if consulted  - Out of bed for toileting  - Record patient progress and toleration of activity level   Outcome: Progressing     Problem: DISCHARGE PLANNING  Goal: Discharge to home or other facility with appropriate resources  Description: INTERVENTIONS:  - Identify barriers to discharge w/patient and caregiver  - Arrange for needed discharge resources and transportation as appropriate  - Identify  discharge learning needs (meds, wound care, etc.)  - Arrange for interpretive services to assist at discharge as needed  - Refer to Case Management Department for coordinating discharge planning if the patient needs post-hospital services based on physician/advanced practitioner order or complex needs related to functional status, cognitive ability, or social support system  Outcome: Progressing     Problem: Knowledge Deficit  Goal: Patient/family/caregiver demonstrates understanding of disease process, treatment plan, medications, and discharge instructions  Description: Complete learning assessment and assess knowledge base.  Interventions:  - Provide teaching at level of understanding  - Provide teaching via preferred learning methods  Outcome: Progressing     Problem: METABOLIC, FLUID AND ELECTROLYTES - ADULT  Goal: Electrolytes maintained within normal limits  Description: INTERVENTIONS:  - Monitor labs and assess patient for signs and symptoms of electrolyte imbalances  - Administer electrolyte replacement as ordered  - Monitor response to electrolyte replacements, including repeat lab results as appropriate  - Instruct patient on fluid and nutrition as appropriate  Outcome: Progressing  Goal: Fluid balance maintained  Description: INTERVENTIONS:  - Monitor labs   - Monitor I/O and WT  - Instruct patient on fluid and nutrition as appropriate  - Assess for signs & symptoms of volume excess or deficit  Outcome: Progressing  Goal: Glucose maintained within target range  Description: INTERVENTIONS:  - Monitor Blood Glucose as ordered  - Assess for signs and symptoms of hyperglycemia and hypoglycemia  - Administer ordered medications to maintain glucose within target range  - Assess nutritional intake and initiate nutrition service referral as needed  Outcome: Progressing     Problem: Prexisting or High Potential for Compromised Skin Integrity  Goal: Skin integrity is maintained or improved  Description:  INTERVENTIONS:  - Identify patients at risk for skin breakdown  - Assess and monitor skin integrity including under and around medical devices   - Assess and monitor nutrition and hydration status  - Monitor labs  - Assess for incontinence   - Turn and reposition patient  - Assist with mobility/ambulation  - Relieve pressure over mariano prominences   - Avoid friction and shearing  - Provide appropriate hygiene as needed including keeping skin clean and dry  - Evaluate need for skin moisturizer/barrier cream  - Collaborate with interdisciplinary team  - Patient/family teaching  - Consider wound care consult    Assess:  - Review Royce scale daily  - Inspect skin when repositioning, toileting, and assisting with ADLS  - Assess extremities for adequate circulation and sensation     Bed Management:  - Have minimal linens on bed & keep smooth, unwrinkled  - Change linens as needed when moist or perspiring  - Toileting:  - Offer bedside commode  Activity:  - Encourage activity and walks on unit  - Encourage or provide ROM exercises   - Use appropriate equipment to lift or move patient in bed    Skin Care:  - Avoid use of baby powder, tape, friction and shearing, hot water or constrictive clothing  - Do not massage red bony areas    Outcome: Progressing     Problem: Decreased Cardiac Output  Goal: Cardiac output adequate for individual needs  Description: INTERVENTIONS: Monitor for signs and symptoms of decreased cardiac output   - Monitor for dyspnea with exertion and at rest  - Monitor for orthopnea  - Monitor for signs of tachycardia. Place patient on telemetry monitoring.  - Assess patient for jugular vein distention  - Assess patient for lower extremity edema and poor peripheral perfusion   - Auscultate lung sound for Fine bibasilar crackles   - Monitor for cardiac arrythmias   - Administer beta blockers, antiarrhythmic, and blood pressure medications as ordered    Outcome: Progressing     Problem: Impaired Gas  Exchange  Goal: Optimize oxygenation and ensure adequate ventilation  Description: INTERVENTIONS: Monitor for signs and symptoms of respiratory distress                - Elevate HOB or use high fowlers to promote lung expansion                - Administer oxygen as ordered to maintain adequate oxygenation                - Encourage use of IS to promote lung expansion and prevent PN                - Monitor ABGs to assess oxygenation status                - Monitor blood oxygen level to maintain adequate oxygenation                - Encourage cough and deep breathing exercises to promote lung expansion                - Monitor patient's mental status for increased confusion    Outcome: Progressing     Problem: Excess Fluid Volume  Goal: Patient is able to achieve and maintain homeostasis  Description: INTERVENTIONS: Monitor for sign and symptoms of fluid overload  - Evaluate LE edema every shift  - Elevate LE to prevent dependent edema  - Apply BRIAN stockings as ordered   - Monitor ankle circumference daily  - Assess for jugular vein distention  - Evaluate provider orders for the CHF diuretic algorithm. Administer diuretics as ordered  - Weigh the patient daily at 0600 and report a weight gain of five pounds or more   - Strict intake and output  - Monitor fluid intake and adhere to fluid restrictions  - Assess lung sounds every shift and as needed  - Monitor vital signs and lab values (CBC, chem, BUN, BNP)  - Measure and document urine output    Outcome: Progressing     Problem: Activity Intolerance  Goal: Patient is able to perform activities within their limitations  Description: INTERVENTIONS:                       -   Alternate periods of activity with periods of rest                 -   Patients is able to maintain normal vitals heart rhythm during activity                 -   Gradually increase activity and exercise as patient can tolerate                 -   Monitor blood pressure and heart before and after  exercise                  -   Monitor blood oxygen saturation during activity and apply oxygen as needed    Outcome: Progressing     Problem: Knowledge Deficit  Goal: Patient is able to verbalize understanding of Heart Failure after education  Description: INTERVENTIONS:  - Educate the patient and family on signs and symptoms of HF  - Provide the patient with HF education and HF zone tool  - Educate on the importance of daily weight in the AM and reporting a weight gain               of 3 or more pounds to their primary care physician  - Monitor for SOB  - Maintain and sodium and fluid restriction  - Educate the patient on the importance of medications such as: diuretics, betablockers,               antiarrhythmics and their purpose, dose, route, side effects and labs               if they are needed    Outcome: Progressing

## 2025-07-18 NOTE — PROGRESS NOTES
Patient:  MEHNAZ LEROY    MRN:  [id number]    Aidin Request ID:  [id number]    Level of care reserved:  Home Health Agency    Partner Reserved:  [facility] [place] [address] [phone]    Clinical needs requested:    Geography searched:  10 miles around [id number]    Start of Service:    Request sent:  9:05am EDT on 7/18/2025 by Olga Benitez    Partner reserved:  1:45pm EDT on 7/18/2025 by Olga Benitez    Choice list shared:  1:45pm EDT on 7/18/2025 by Olga Benitez

## 2025-07-18 NOTE — PLAN OF CARE
Problem: PAIN - ADULT  Goal: Verbalizes/displays adequate comfort level or baseline comfort level  Description: Interventions:  - Encourage patient to monitor pain and request assistance  - Assess pain using appropriate pain scale  - Administer analgesics as ordered based on type and severity of pain and evaluate response  - Implement non-pharmacological measures as appropriate and evaluate response  - Consider cultural and social influences on pain and pain management  - Notify physician/advanced practitioner if interventions unsuccessful or patient reports new pain  - Educate patient/family on pain management process including their role and importance of  reporting pain   - Provide non-pharmacologic/complimentary pain relief interventions  Outcome: Progressing     Problem: INFECTION - ADULT  Goal: Absence or prevention of progression during hospitalization  Description: INTERVENTIONS:  - Assess and monitor for signs and symptoms of infection  - Monitor lab/diagnostic results  - Monitor all insertion sites, i.e. indwelling lines, tubes, and drains  - Monitor endotracheal if appropriate and nasal secretions for changes in amount and color  - [place] appropriate cooling/warming therapies per order  - Administer medications as ordered  - Instruct and encourage patient and family to use good hand hygiene technique  - Identify and instruct in appropriate isolation precautions for identified infection/condition  Outcome: Progressing     Problem: SAFETY ADULT  Goal: Patient will remain free of falls  Description: INTERVENTIONS:  - Educate patient/family on patient safety including physical limitations  - Instruct patient to call for assistance with activity   - Consider consulting OT/PT to assist with strengthening/mobility based on AM PAC & JH-HLM score  - Consult OT/PT to assist with strengthening/mobility   - Keep Call bell within reach  - Keep bed low and locked with side rails adjusted as appropriate  - Keep  care items and personal belongings within reach  - Initiate and maintain comfort rounds  - Make Fall Risk Sign visible to staff  - Offer Toileting every 2 Hours, in advance of need  - Initiate/Maintain alarm  - Obtain necessary fall risk management equipment  - Apply yellow socks and bracelet for high fall risk patients  - Consider moving patient to room near nurses station  Outcome: Progressing  Goal: Maintain or return to baseline ADL function  Description: INTERVENTIONS:  -  Assess patient's ability to carry out ADLs; assess patient's baseline for ADL function and identify physical deficits which impact ability to perform ADLs (bathing, care of mouth/teeth, toileting, grooming, dressing, etc.)  - Assess/evaluate cause of self-care deficits   - Assess range of motion  - Assess patient's mobility; develop plan if impaired  - Assess patient's need for assistive devices and provide as appropriate  - Encourage maximum independence but intervene and supervise when necessary  - Involve family in performance of ADLs  - Assess for home care needs following discharge   - Consider OT consult to assist with ADL evaluation and planning for discharge  - Provide patient education as appropriate  - Monitor functional capacity and physical performance, use of AM PAC & JH-HLM   - Monitor gait, balance and fatigue with ambulation    Outcome: Progressing  Goal: Maintains/Returns to pre admission functional level  Description: INTERVENTIONS:  - Perform AM-PAC 6 Click Basic Mobility/ Daily Activity assessment daily.  - Set and communicate daily mobility goal to care team and patient/family/caregiver.   - Collaborate with rehabilitation services on mobility goals if consulted  - Perform Range of Motion - times a day.  - Reposition patient every -- hours.  - Dangle patient - times a day  - Stand patient - times a day  - Ambulate patient - times a day  - Out of bed to chair - times a day   - Out of bed for meals - times a day  - Out of  bed for toileting  - Record patient progress and toleration of activity level   Outcome: Progressing     Problem: DISCHARGE PLANNING  Goal: Discharge to home or other facility with appropriate resources  Description: INTERVENTIONS:  - Identify barriers to discharge w/patient and caregiver  - Arrange for needed discharge resources and transportation as appropriate  - Identify discharge learning needs (meds, wound care, etc.)  - Arrange for interpretive services to assist at discharge as needed  - Refer to Case Management Department for coordinating discharge planning if the patient needs post-hospital services based on physician/advanced practitioner order or complex needs related to functional status, cognitive ability, or social support system  Outcome: Progressing     Problem: Knowledge Deficit  Goal: Patient/family/caregiver demonstrates understanding of disease process, treatment plan, medications, and discharge instructions  Description: Complete learning assessment and assess knowledge base.  Interventions:  - Provide teaching at level of understanding  - Provide teaching via preferred learning methods  Outcome: Progressing     Problem: METABOLIC, FLUID AND ELECTROLYTES - ADULT  Goal: Electrolytes maintained within normal limits  Description: INTERVENTIONS:  - Monitor labs and assess patient for signs and symptoms of electrolyte imbalances  - Administer electrolyte replacement as ordered  - Monitor response to electrolyte replacements, including repeat lab results as appropriate  - Instruct patient on fluid and nutrition as appropriate  Outcome: Progressing  Goal: Fluid balance maintained  Description: INTERVENTIONS:  - Monitor labs   - Monitor I/O and WT  - Instruct patient on fluid and nutrition as appropriate  - Assess for signs & symptoms of volume excess or deficit  Outcome: Progressing  Goal: Glucose maintained within target range  Description: INTERVENTIONS:  - Monitor Blood Glucose as ordered  - Assess  for signs and symptoms of hyperglycemia and hypoglycemia  - Administer ordered medications to maintain glucose within target range  - Assess nutritional intake and initiate nutrition service referral as needed  Outcome: Progressing     Problem: Prexisting or High Potential for Compromised Skin Integrity  Goal: Skin integrity is maintained or improved  Description: INTERVENTIONS:  - Identify patients at risk for skin breakdown  - Assess and monitor skin integrity including under and around medical devices   - Assess and monitor nutrition and hydration status  - Monitor labs  - Assess for incontinence   - Turn and reposition patient  - Assist with mobility/ambulation  - Relieve pressure over mariano prominences   - Avoid friction and shearing  - Provide appropriate hygiene as needed including keeping skin clean and dry  - Evaluate need for skin moisturizer/barrier cream  - Collaborate with interdisciplinary team  - Patient/family teaching  - Consider wound care consult    Assess:  - Review Royce scale daily  - Clean and moisturize skin every -  - Inspect skin when repositioning, toileting, and assisting with ADLS  - Assess under medical devices such as - every --  - Assess extremities for adequate circulation and sensation     Bed Management:  - Have minimal linens on bed & keep smooth, unwrinkled  - Change linens as needed when moist or perspiring  - Avoid sitting or lying in one position for more than - hours while in bed  - Keep HOB at - degrees   - Toileting:  - Offer bedside commode  - Assess for incontinence every -  - Use incontinent care products after each incontinent episode such as -    Activity:  - Mobilize patient - times a day  - Encourage activity and walks on unit  - Encourage or provide ROM exercises   - Turn and reposition patient every - Hours  - Use appropriate equipment to lift or move patient in bed  - Instruct/ Assist with weight shifting every - when out of bed in chair  - Consider limitation of  chair time - hour intervals    Skin Care:  - Avoid use of baby powder, tape, friction and shearing, hot water or constrictive clothing  - Relieve pressure over bony prominences using -  - Do not massage red bony areas    Next Steps:  - Teach patient strategies to minimize risks such as -  - Consider consults to  interdisciplinary teams such as -  Outcome: Progressing

## 2025-07-18 NOTE — PROGRESS NOTES
Progress Note - Nephrology   Name: Bailey Olsen 54 y.o. female I MRN: [id number]  Unit/Bed#: -01 I Date of Admission: 7/16/2025   Date of Service: 7/18/2025 I Hospital Day: 2     Assessment & Plan  Acute renal failure with tubular necrosis (HCC)    The patient developed acute renal failure and at least by what was recorded to oliguric urine volumes.  Already this morning she was collected more at 400 cc and says she is urinating more although it may not all be collected that she is having stools with lactulose that are loose.  I suspect the patient has contrast-induced ATN there is always the likelihood of contributing component related to hepatorenal issues as she has cirrhosis.  She did receive Toradol as well in the emergency room and IV contrast.    Creatinine had increased to 2.6 but it has been checked again this morning and it is down to 2.4.  Hopefully that this implying her indicating that she is entering renal recovery from her ATN in the insult.    She was on some supportive care with fluids as well as diuretic infusion and initiated octreotide and received a dose of midodrine.  Blood pressure runs low but could be near her baseline as she is cirrhotic.    Continue Lasix drip for 24 hours  If develops significant polyuria can reduce rate  BMP to be monitored for recovery of renal function    ATN (acute tubular necrosis) (HCC)    ATN as above.    The patient is status post traumatic injury and fall.  She has suffered some orthopedic trauma with compression fracture and finger fracture.  Has been seen by neurosurgery and orthopedics.    Liver cirrhosis secondary to HYATT (nonalcoholic steatohepatitis) (HCC)    History of cirrhosis.      Subjective     Patient is awake and alert seems to be at her baseline her  was in the room visiting as well.  She says she had a decent night.  She is noting that she is urinating more although does not collect that she says she is going when she has a bowel  movement.    No fever chills  No nausea vomiting abdominal pain  No chest pain shortness of breath    Objective :  Temp:  [97 °F (36.1 °C)-98.5 °F (36.9 °C)] 98.5 °F (36.9 °C)  HR:  [] 88  BP: ()/(36-74) 103/55  Resp:  [18-20] 20  SpO2:  [89 %-96 %] 93 %  O2 Device: None (Room air)    Current Weight: Weight - Scale: 118 kg (259 lb 0.7 oz)  First Weight: Weight - Scale: 117 kg (258 lb 9.6 oz)  I/O         07/16 0701 07/17 0700 07/17 0701 07/18 0700 07/18 0701 07/19 0700    P.O. 360 720 420    I.V. (mL/kg) 80 (0.7)      IV Piggyback 359.2      Total Intake(mL/kg) 799.2 (6.8) 720 (6.2) 420 (3.6)    Urine (mL/kg/hr) 100 300 (0.1) 284 (0.9)    Stool  0     Total Output 100 300 284    Net +699.2 +420 +136           Unmeasured Urine Occurrence 1 x      Unmeasured Stool Occurrence  1 x           Physical Exam  Constitutional:       General: She is not in acute distress.     Appearance: She is not toxic-appearing.   HENT:      Head: Normocephalic and atraumatic.      Mouth/Throat:      Mouth: Mucous membranes are moist.     Eyes:      Extraocular Movements: Extraocular movements intact.       Cardiovascular:      Rate and Rhythm: Normal rate and regular rhythm.      Heart sounds:      No gallop.   Pulmonary:      Effort: Pulmonary effort is normal. No respiratory distress.      Breath sounds: No wheezing or rales.   Abdominal:      General: There is no distension.      Palpations: Abdomen is soft.      Tenderness: There is no abdominal tenderness.     Neurological:      Mental Status: She is alert and oriented to person, place, and time.     Psychiatric:         Mood and Affect: Mood normal.         Behavior: Behavior normal.       Medications:  Current Medications[1]      Lab Results: I have reviewed the following results:  Results from last 7 days   Lab Units 07/18/25  0834 07/18/25  0522 07/17/25  1646 07/17/25  0610 07/17/25  0542 07/17/25  0133 07/16/25  1825 07/16/25  1009   WBC Thousand/uL  --  2.25*   "--  3.11*  --   --   --  3.05*   HEMOGLOBIN g/dL  --  8.3*  --  8.2*  --   --   --  8.6*   HEMATOCRIT %  --  27.0*  --  26.0*  --   --   --  27.5*   PLATELETS Thousands/uL  --  77*  --  65*  --   --   --  68*   POTASSIUM mmol/L 3.9 3.8 3.3*  --  3.6 2.9* 2.7* 2.1*   CHLORIDE mmol/L 94* 95* 93*  --  94* 95* 92* 89*   CO2 mmol/L 36* 31 35*  --  33* 38* 35* 42*   BUN mg/dL 18 19 16  --  14 12 11 10   CREATININE mg/dL 2.48* 2.62* 2.28*  --  1.76* 1.54* 1.09 0.98   CALCIUM mg/dL 9.3 8.7 8.5  --  7.6* 7.3* 7.6* 7.4*   MAGNESIUM mg/dL  --   --   --   --  1.8*  --   --   --    ALBUMIN g/dL  --  3.6  --   --  2.7*  --   --  2.5*       Administrative Statements     Portions of the record may have been created with voice recognition software. Occasional wrong word or \"sound a like\" substitutions may have occurred due to the inherent limitations of voice recognition software. Read the chart carefully and recognize, using context, where substitutions have occurred.If you have any questions, please contact the dictating provider.       [1]   Current Facility-Administered Medications:     amitriptyline (ELAVIL) tablet 50 mg, 50 mg, Oral, HS, Ruslan Keyes MD, 50 mg at 07/17/25 2136    cyclobenzaprine (FLEXERIL) tablet 10 mg, 10 mg, Oral, TID PRN, Ruslan Keyes MD, 10 mg at 07/17/25 0550    dapsone tablet 50 mg, 50 mg, Oral, Daily, Ruslan Keyes MD, 50 mg at 07/18/25 0815    folic acid (FOLVITE) tablet 1 mg, 1 mg, Oral, Daily, Ruslan Keyes MD, 1 mg at 07/18/25 0812    furosemide (LASIX) 500 mg infusion 50 mL, 40 mg/hr, Intravenous, Continuous, Ilia Wilkins MD, Last Rate: 4 mL/hr at 07/18/25 0316, 40 mg/hr at 07/18/25 0316    heparin (porcine) subcutaneous injection 5,000 Units, 5,000 Units, Subcutaneous, Q8H MIGUEL, Ruslan Keyes MD, 5,000 Units at 07/18/25 0558    lactulose (CHRONULAC) oral solution 30 g, 30 g, Oral, 4x Daily, Ruslan Keyes MD, 30 g at 07/18/25 0812    lidocaine (LIDODERM) 5 % patch 1 patch, " 1 patch, Topical, Daily, Ruslan Keyes MD    [Held by provider] metoprolol tartrate (LOPRESSOR) tablet 25 mg, 25 mg, Oral, Q12H [facility], Ruslan Keyes MD    octreotide (SandoSTATIN) injection 100 mcg, 100 mcg, Subcutaneous, Q8H MIGUEL, Ilia Wilkins MD, 100 mcg at 07/18/25 0558    oxyCODONE (ROXICODONE) immediate release tablet 10 mg, 10 mg, Oral, Q6H PRN, Ruslan Keyes MD, 10 mg at 07/18/25 0616    pantoprazole (PROTONIX) EC tablet 40 mg, 40 mg, Oral, Early Morning, Ruslan Keyes MD, 40 mg at 07/18/25 0558    polyethylene glycol (MIRALAX) packet 17 g, 17 g, Oral, Daily, Ruslan Keyes MD, 17 g at 07/18/25 0811    rifaximin (XIFAXAN) tablet 550 mg, 550 mg, Oral, Q12H MIGUEL, Ruslan Keyes MD, 550 mg at 07/18/25 0812    trimethobenzamide (TIGAN) IM injection 200 mg, 200 mg, Intramuscular, Q6H PRN, Ruslan Keyes MD, 200 mg at 07/16/25 1656    ursodiol (ACTIGALL) capsule 300 mg, 300 mg, Oral, BID, Ruslan Keyes MD, 300 mg at 07/18/25 0812

## 2025-07-18 NOTE — ASSESSMENT & PLAN NOTE
The patient developed acute renal failure and at least by what was recorded to oliguric urine volumes.  Already this morning she was collected more at 400 cc and says she is urinating more although it may not all be collected that she is having stools with lactulose that are loose.  I suspect the patient has contrast-induced ATN there is always the likelihood of contributing component related to hepatorenal issues as she has cirrhosis.  She did receive Toradol as well in the emergency room and IV contrast.    Creatinine had increased to 2.6 but it has been checked again this morning and it is down to 2.4.  Hopefully that this implying her indicating that she is entering renal recovery from her ATN in the insult.    She was on some supportive care with fluids as well as diuretic infusion and initiated octreotide and received a dose of midodrine.  Blood pressure runs low but could be near her baseline as she is cirrhotic.    Continue Lasix drip for 24 hours  If develops significant polyuria can reduce rate  BMP to be monitored for recovery of renal function

## 2025-07-18 NOTE — PLAN OF CARE
Problem: OCCUPATIONAL THERAPY ADULT  Goal: Performs self-care activities at highest level of function for planned discharge setting.  See evaluation for individualized goals.  Description: Treatment Interventions: ADL retraining, Functional transfer training, UE strengthening/ROM, Endurance training, Patient/family training, Equipment evaluation/education, Compensatory technique education, Activityengagement, Energy conservation          See flowsheet documentation for full assessment, interventions and recommendations.   Note: Limitation: Decreased ADL status, Decreased UE strength, Decreased Safe judgement during ADL, Decreased endurance, Decreased high-level ADLs  Prognosis: Fair  Assessment: see note for details

## 2025-07-18 NOTE — CASE MANAGEMENT
Case Management Discharge Planning Note    Patient name Bailey Olsen  Location /-01 MRN [id number]  : 1970 Date 2025       Current Admission Date: 2025  Current Admission Diagnosis:Hypokalemia   Patient Active Problem List    Diagnosis Date Noted    Oliguria 2025    Acute renal failure with tubular necrosis (HCC) 2025    ATN (acute tubular necrosis) (HCC) 2025    Closed fracture of phalanx of right middle finger 2025    Closed fracture of twelfth thoracic vertebra (HCC) 2025    Stage 3a chronic kidney disease (HCC) 2025    Scalp hematoma 2025    Contusion of back 2025    Ecchymosis 2025    Falls 2025    Chest pain 2025    Abdominal pain 2025    Acute kidney injury (HCC) 2025    Gram-negative bacteremia 2025    Acute respiratory failure (HCC) 2025    Carcinoid tumor of pancreas 2025    Hypomagnesemia 2025    Upper GI bleed 2025    Acute anemia 2025    Rectal bleeding 2025    Acute blood loss anemia 2025    Chronic, continuous use of opioids 2025    Acute metabolic encephalopathy 2025    SIRS (systemic inflammatory response syndrome) ([facility]) 2025    Sacral fracture, closed ([facility]) 11/15/2024    KISHORE (acute kidney injury) ([facility]) 10/04/2024    Morbid obesity with BMI of 40.0-44.9, adult ([facility]) 10/04/2024    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) 2024    Fall from standing 2024    Closed compression fracture of L1 lumbar vertebra, sequela 2024    Closed stable burst fracture of first lumbar vertebra with routine healing 2024    Groin hematoma 2024    Ambulatory dysfunction 2024    Intractable back pain 2024    Fall 2024    Liver cirrhosis secondary to HYATT (nonalcoholic steatohepatitis) (HCC) 2024    History of malignant carcinoid tumor of small intestine 2024     Class 3 severe obesity due to excess calories in adult 05/26/2024    Primary hypertension 05/26/2024    Hypokalemia 05/26/2024    Fibromyalgia 05/26/2024    Iron deficiency anemia 05/26/2024    Pancytopenia (HCC) 05/26/2024    Sarcoidosis 05/26/2024    Bilateral pulmonary infiltrates 05/26/2024      LOS (days): 2  Geometric Mean LOS (GMLOS) (days): 4.6  Days to GMLOS:2.6     OBJECTIVE:  Risk of Unplanned Readmission Score: 69.73         Current admission status: Inpatient   Preferred Pharmacy:   [id number] - [place], [place] - [address]  [address] [address]  Phone: [phone] Fax: [phone]    [id number] - [place], [place] - [address] [address]  Phone: [phone] Fax: [phone]    Primary Care Provider: Nayely Brown DO    Primary Insurance: BLUE CROSS  Secondary Insurance:     DISCHARGE DETAILS:          Comments - Freedom of Choice: [place] referral to [facility]     Were Treatment Team discharge recommendations reviewed with patient/caregiver?: Yes  Did patient/caregiver verbalize understanding of patient care needs?: Yes            Requested Home Health Care         Home Health Discipline requested:: Nursing, Occupational Therapy, Physical Therapy  Home Health Agency Name:: [facility] External Referral Reason (only applicable if external HHA name selected): Patient has established relationship with provider  Home Health Follow-Up Provider:: PCP  Home Health Services Needed:: Evaluate Functional Status and Safety, Gait/ADL Training, Strengthening/Theraputic Exercises to Improve Function, Other (comment) (medication management)  Homebound Criteria Met:: Uses an Assist Device (i.e. cane, walker, etc)  Supporting Clincal Findings:: Limited Endurance    DME Referral Provided  Referral made for DME?: No    Other Referral/Resources/Interventions Provided:  Interventions: C  Referral Comments:  [facility]                   CM reserved [facility] in [place] for discharge need [place].

## 2025-07-18 NOTE — ASSESSMENT & PLAN NOTE
ATN as above.    The patient is status post traumatic injury and fall.  She has suffered some orthopedic trauma with compression fracture and finger fracture.  Has been seen by neurosurgery and orthopedics.

## 2025-07-18 NOTE — ASSESSMENT & PLAN NOTE
Baseline creatinine around 0.9  Creatinine is 1.7  Patient did not make enough urine  Suspect contrast induced ATN  Also running hypotensive, received IV hydration, albumin therapy, midodrine  Patient remained on Lasix drip-even with Lasix drip, patient is +7 19.2  Renal function trend up to 2.62-patient started making urine, discussed with nephrology, recommending continue Lasix drip.  Patient also has liver cirrhosis  Will discuss with nephrology for further management plan
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
## 2025-07-18 NOTE — OCCUPATIONAL THERAPY NOTE
Occupational Therapy Evaluation     Patient Name: Bailey Olsen  Today's Date: 7/18/2025  Problem List  Principal Problem:    Hypokalemia  Active Problems:    Liver cirrhosis secondary to HYATT (nonalcoholic steatohepatitis) (HCC)    Pancytopenia (HCC)    Fall    Morbid obesity with BMI of 40.0-44.9, adult (HCC)    Chronic, continuous use of opioids    Closed fracture of phalanx of right middle finger    Closed fracture of twelfth thoracic vertebra (HCC)    Acute renal failure with tubular necrosis (HCC)    ATN (acute tubular necrosis) (HCC)    Oliguria    Past Medical History  Past Medical History[1]  Past Surgical History  Past Surgical History[2]        07/18/25 1124   OT Last Visit   OT Visit Date 07/18/25   Note Type   Note type Evaluation   Pain Assessment   Pain Assessment Tool 0-10   Pain Score 6   Pain Location/Orientation Location: Generalized   Restrictions/Precautions   Weight Bearing Precautions Per Order No   Braces or Orthoses TLSO  (on OOB)   Other Precautions Chair Alarm;Bed Alarm;Multiple lines;Fall Risk;Pain   Home Living   Type of Home House  (3 F[address]RIC[address] partial railing)   Home Layout Two level;Able to live on main level with bedroom/bathroom;Performs ADLs on one level  (pt remains on first floor)   Bathroom Shower/Tub Tub/shower unit   Bathroom Toilet Raised   Bathroom Equipment Grab bars around toilet;Grab bars in shower;Shower chair   Bathroom Accessibility Accessible via walker   Home Equipment Walker;Grab bars;Cane;Wheelchair-manual   Additional Comments pt was still participating in home health therapy prior to hospitalization   Prior Function   Level of [place] Needs assistance with ADLs;Independent with functional mobility;Independent with IADLS  (occasionally needs help with ADLs but mostly completes for self)   Lives With Spouse;Daughter  ( and daughter Christina who lives on 2nd floor)   Receives Help From Family  (pt notes  and daughter both work full time)   IADLs  "Independent with meal prep;Independent with medication management;Family/Friend/Other provides transportation   Falls in the last 6 months 1 to 4  (3)   Vocational On disability   General   Family/Caregiver Present No   Subjective   Subjective \"This is the 2nd time I broke my back\"   ADL   Where Assessed Chair   Eating Assistance 5  Supervision/Setup   Eating Deficit Setup  (open all containers)   Grooming Assistance 5  Supervision/Setup   Grooming Deficit Supervision/safety;Increased time to complete;Standing with assistive device   UB Dressing Assistance 5  Supervision/Setup   UB Dressing Deficit Setup;Increased time to complete   LB Dressing Assistance 4  Minimal Assistance   LB Dressing Deficit Steadying;Setup;Requires assistive device for steadying;Verbal cueing;Supervision/safety;Increased time to complete  (A for balance in stance)   Toileting Assistance  3  Moderate Assistance   Additional Comments total A to don TLSO brace while seated at start of session   Bed Mobility   Additional Comments pt OOB at start and end of session   Transfers   Sit to Stand 5  Supervision   Additional items Increased time required;Verbal cues   Stand to Sit 5  Supervision   Additional items Increased time required;Verbal cues   Stand pivot 5  Supervision   Additional items Increased time required;Verbal cues   Toilet transfer 4  Minimal assistance   Additional items Assist x 1;Increased time required;Verbal cues  (grab bars; pt noted that hospital toilet is lower than her toilet at home)   Functional Mobility   Functional Mobility 5  Supervision   Additional Comments close supervision, household distances in room/bathroom during session   Additional items Rolling walker   Balance   Static Sitting Good   Dynamic Sitting Fair +   Static Standing Fair   Dynamic Standing Fair -   Ambulatory Fair -   Activity Tolerance   Activity Tolerance Patient tolerated treatment well   Medical Staff Made Aware PTPayal   Nurse Made Aware RN, " "Meera   RUE Assessment   RUE Assessment WFL   LUE Assessment   LUE Assessment WFL   Hand Function   Gross Motor Coordination Functional   Fine Motor Coordination Functional   Sensation   Light Touch No apparent deficits   Vision-Basic Assessment   Current Vision Wears contacts   Patient Visual Report   (no new visual changes, pt does have bruising on R eye from fall but no complaints in regard to vision)   Cognition   Overall Cognitive Status WFL   Arousal/Participation Alert;Responsive;Cooperative   Orientation Level Oriented X4   Memory Within functional limits   Assessment   Limitation Decreased ADL status;Decreased UE strength;Decreased Safe judgement during ADL;Decreased endurance;Decreased high-level ADLs   Prognosis Fair   Assessment see note for details   Goals   Patient Goals \"go home\"   Plan   Treatment Interventions ADL retraining;Functional transfer training;UE strengthening/ROM;Endurance training;Patient/family training;Equipment evaluation/education;Compensatory technique education;Activityengagement;Energy conservation   Goal Expiration Date 08/01/25   OT Frequency 2-3x/wk   Discharge Recommendation   Rehab Resource Intensity Level, OT III (Minimum Resource Intensity)   AM-PAC Daily Activity Inpatient   Lower Body Dressing 3   Bathing 3   Toileting 3   Upper Body Dressing 4   Grooming 4   Eating 4   Daily Activity Raw Score 21   Daily Activity Standardized Score (Calc for Raw Score >=11) 44.27   AM-PAC Applied Cognition Inpatient   Following a Speech/Presentation 3   Understanding Ordinary Conversation 4   Taking Medications 3   Remembering Where Things Are Placed or Put Away 3   Remembering List of 4-5 Errands 3   Taking Care of Complicated Tasks 3   Applied Cognition Raw Score 19   Applied Cognition Standardized Score 39.77   Additional Treatment Session   Start Time 1142   End Time 1152   Treatment Assessment Pt tolerated today's treatement well and was pleasant and cooperative throughout. Pt " completed functional mobility in room/bathroom with close supervision for safety with use of RW and to manage IV pole. Pt with TLSO brace donned throughout. Pt was continent of bladder seated on toilet; required A for hooking hospital brief and to complete transfer off of toilet noting her toilet is higher at home and she does not have any trouble. Pt demonstrated with poor safety awareness at times attempting to ambulate in room without RW and reaching down to get purse off of the floor-- hands on assist and verbal cues provided to encourage pt to safely sit back down in recliner chair. At end of session, pt safely seated in recliner chair with all needs in reach, chair alarm activated, tan cord plugged in, eating lunch. Pt with no further questions/concerns for OT at this time.   Additional Treatment Day 1       The patient's raw score on the AM-PAC Daily Activity inpatient short form is 21, standardized score is 44.27, greater than 39.4. Patients at this level are likely to benefit from DC to home c home health services. Please refer to the recommendation of the Occupational Therapist for safe DC planning.      Pt is 54 y.o., female, evaluated at [facility] 07/18/25 due to Hypokalemia. OT order placed to assess Pt's ADLs, cognitive status, and performance during functional tasks in order to maximize safety and independence while making most appropriate d/c recommendations. An occupational profile and medical/therapy history were completed, including a expanded review of physical, cognitive, psychosocial history related to pt’s current functional performance. Pt with PMHx impacting their performance during ADL tasks include: morbid obesity, liver cirrhosis .  Performance deficits/impairments identified at time of initial evaluation, that are impacting Pt's occupational performance and ability to safely return to PLOF, include decreased LB dressing, decreased toileting, decreased LB bathing, decreased personal hygiene,  decreased medication management, decreased meal preparation, decreased rest/sleep hygiene, decreased leisure activities, decreased driving/community mobility, decreased functional household distances, decreased functional community distances, and decreased household cleaning/decreased strength, decreased endurance, decreased activity tolerance, impaired balance, impaired coordination, decreased receptiveness to experience, education, recommendations, decreased attention, decreased safety awareness, impaired judgement, visual impairment, LE edema, incontinence, orthopedic restrictions, obesity, and spinal precautions. Personal factors such as need for assistive device, need for adaptive equipment, limited availability of support from family/caregiver, stair(s) to enter home environment, inability to ambulate household distances, inability to ambulate community distances, need for assistance with ADLs, need for assistance with IADLs, frequent falls/fall history, near miss falls, fall risk, health literacy, limited insight to deficits, past and current experience, impulsivity, decreased attention, questionable noncompliance, and efficiency in ADL task completion, and medical complications of pain impacting overall mobility status, edema/swelling, wounds, decreased skin integrity, multiple readmissions, impulsivity during admission, incontinence, and need for input for mobility technique/safety are also affecting pt and may cause a barrier to discharge. In consideration of history obtained, identification of performance deficits, comorbidities that affect occupational performance, clinical presentation/decision-making, and modification of tasks/assistance required to enable pt to complete evaluation components, this evaluation is determined to be of moderate complexity. Pt will benefit from continued skilled acute OT services to address barriers as defined above and to maximize level of independence/participation during  ADLs and functional tasks to facilitate return toward PLOF and improved QoL. Pt would benefit from skilled OT intervention to address trial with LHAE, additional cognitive assessment, comprehensive ADL, therapeutic exercise, functional mobility, UE strengthening, balance during functional tasks, endurance, tolerance to upright position, and compensatory strategies to maximize the potential of meeting client centered goals. From an OT standpoint, Level III (Minimum Resource Intensity is recommended upon d/c.        Pt goals to be met by 8/1/25:    Pt will demonstrate ability to complete grooming/hygiene tasks @ mod i after set-up.  Pt will demonstrate ability to complete supine<>sit @ mod I in order to increase safety and independence during ADL tasks.  Pt will demonstrate ability to complete UB ADLs including washing/dressing @ mod I in order to increase performance and participation during meaningful tasks  Pt will demonstrate ability to complete LB dressing @ mod I in order to increase safety and independence during meaningful tasks.   Pt will demonstrate ability to iain/doff socks/shoes while sitting EOB @ mod I in order to increase safety and independence during meaningful tasks.   Pt will demonstrate ability to complete toileting tasks including CM and pericare @ mod I in order to increase safety and independence during meaningful tasks.  Pt will demonstrate ability to complete EOB, chair, toilet/commode transfers @ mod I in order to increase performance and participation during functional tasks.  Pt will demonstrate ability to stand for 10 minutes while maintaining fair+ balance with use of LRAD for UB support PRN.  Pt will demonstrate ability to tolerate 30-35 minute OT session with no vc'ing for deep breathing or use of energy conservation techniques in order to increase activity tolerance during functional tasks.   Pt will demonstrate Good carryover of use of energy conservation/compensatory strategies  during ADLs and functional tasks in order to increase safety and reduce risk for falls.   Pt will demonstrate Good attention and participation in continued evaluation of functional ambulation house hold distances in order to assist with safe d/c planning.  Pt will attend to continued cognitive assessments 100% of the time in order to provide most appropriate d/c recommendations.   Pt will follow 100% simple 2-step commands and be A&O x4 consistently with environmental cues to increase participation in functional activities.   Pt will identify 3 areas of interest/hobbies and 1 intervention on how to incorporate into daily life in order to increase interaction with environment and peers as well as increase participation in meaningful tasks.   Pt will demonstrate 100% carryover of BUE HEP in order to increase BUE MS and increase performance during functional tasks upon d/c home.    Penelope Wu MS OTR/L         [1]   Past Medical History:  Diagnosis Date    Back pain     Carcinoid tumor     neuroendocrine    CHF (congestive heart failure) (HCC)     Cirrhosis of liver (HCC)     Hypertension     Sarcoidosis    [2]   Past Surgical History:  Procedure Laterality Date    CHOLECYSTECTOMY      GASTRIC BYPASS      HERNIA REPAIR      IR KYPHOPLASTY/VERTEBROPLASTY  10/15/2024

## 2025-07-18 NOTE — CASE MANAGEMENT
Case Management Discharge Planning Note    Patient name Bailey Olsen  Location /-01 MRN [id number]  : 1970 Date 2025       Current Admission Date: 2025  Current Admission Diagnosis:Hypokalemia   Patient Active Problem List    Diagnosis Date Noted    Acute renal failure with tubular necrosis (HCC) 2025    ATN (acute tubular necrosis) (HCC) 2025    Closed fracture of phalanx of right middle finger 2025    Closed fracture of twelfth thoracic vertebra (HCC) 2025    Stage 3a chronic kidney disease (HCC) 2025    Scalp hematoma 2025    Contusion of back 2025    Ecchymosis 2025    Falls 2025    Chest pain 2025    Abdominal pain 2025    Acute kidney injury (HCC) 2025    Gram-negative bacteremia 2025    Acute respiratory failure (HCC) 2025    Carcinoid tumor of pancreas 2025    Hypomagnesemia 2025    Upper GI bleed 2025    Acute anemia 2025    Rectal bleeding 2025    Acute blood loss anemia 2025    Chronic, continuous use of opioids 2025    Acute metabolic encephalopathy 2025    SIRS (systemic inflammatory response syndrome) ([facility]) 2025    Sacral fracture, closed ([facility]) 11/15/2024    KISHORE (acute kidney injury) ([facility]) 10/04/2024    Morbid obesity with BMI of 40.0-44.9, adult ([facility]) 10/04/2024    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) 2024    Fall from standing 2024    Closed compression fracture of L1 lumbar vertebra, sequela 2024    Closed stable burst fracture of first lumbar vertebra with routine healing 2024    Groin hematoma 2024    Ambulatory dysfunction 2024    Intractable back pain 2024    Fall 2024    Liver cirrhosis secondary to HYATT (nonalcoholic steatohepatitis) (HCC) 2024    History of malignant carcinoid tumor of small intestine 2024    Class 3 severe obesity  due to excess calories in adult 05/26/2024    Primary hypertension 05/26/2024    Hypokalemia 05/26/2024    Fibromyalgia 05/26/2024    Iron deficiency anemia 05/26/2024    Pancytopenia (HCC) 05/26/2024    Sarcoidosis 05/26/2024    Bilateral pulmonary infiltrates 05/26/2024      LOS (days): 2  Geometric Mean LOS (GMLOS) (days): 4.6  Days to GMLOS:2.8     OBJECTIVE:  Risk of Unplanned Readmission Score: 70.2         Current admission status: Inpatient   Preferred Pharmacy:   [facility] [address] [place] [address] - [address] [facility]  [address] [facility] [address]  Phone: [phone] Fax: [phone]    [id number] - [place], [place] - [facility] [address]  Phone: [phone] Fax: [phone]    Primary Care Provider: Nayely Brown DO    Primary Insurance: BLUE CROSS  Secondary Insurance:     DISCHARGE DETAILS:           CM submitted AIDIN referral for [facility] to [facility].  CM to follow.

## 2025-07-18 NOTE — PHYSICAL THERAPY NOTE
PHYSICAL THERAPY EVALUATION  NAME:  Bailey Olsen  DATE: 07/18/25    AGE:   54 y.o.  Mrn:   [id number]  ADMIT DX:  Hypokalemia [E87.6]  Finger fracture, right [S62.609A]  QT prolongation [R94.31]  Intractable back pain [M54.9]  Fall, initial encounter [W19.XXXA]  Headache [R51.9]  Ambulatory dysfunction [R26.2]  Closed fracture of twelfth thoracic vertebra (HCC) [S22.089A]    Past Medical History[1]  Length Of Stay: 2  Performed at least 2 patient identifiers during session: Name and Birthday  PHYSICAL THERAPY EVALUATION :     Evaluation time: 8032-1084  Evaluation length: 27 min       07/18/25 1329   Note Type   Note type Evaluation   Pain Assessment   Pain Assessment Tool 0-10   Pain Score 8   Pain Location/Orientation Location: Head;Location: Back   Restrictions/Precautions   Weight Bearing Precautions Per Order No   Braces or Orthoses TLSO  (on OOB or bed > 45 deg)   Other Precautions Chair Alarm;Bed Alarm;Multiple lines;Fall Risk;Pain   Home Living   Type of Home House  (3 FABRICIO partial railing)   Home Layout Two level;Performs ADLs on one level;Able to live on main level with bedroom/bathroom   Bathroom Shower/Tub Tub/shower unit   Bathroom Toilet Raised   Bathroom Equipment Grab bars in shower;Built-in shower seat;Shower chair   Home Equipment Walker;Cane;Wheelchair-manual   Additional Comments Pt reports living in 2  with spouse and dtr, FFSU with RW use   Prior Function   Level of [place] Independent with functional mobility;Independent with IADLS;Independent with ADLs  (occasionally needs help with ADLs but mostly completes for self)   Lives With Spouse;Daughter  ( and daughter Christina who lives on 2nd floor)   Receives Help From Family  (pt notes  and daughter both work full time)   IADLs Independent with meal prep;Independent with medication management;Family/Friend/Other provides transportation   Falls in the last 6 months 1 to 4  (3)   Vocational On disability   Comments Pt reports  completing ADLs, IADLs, and mobility with RW at mod I, has assistance with transportation   Cognition   Overall Cognitive Status WFL   Orientation Level Oriented X4   Following Commands Follows one step commands with increased time or repetition   RLE Assessment   RLE Assessment WFL  (grossly at least 3+/5 strength)   LLE Assessment   LLE Assessment WFL  (grossly at least 3+/5 strength)   Light Touch   RLE Light Touch Grossly intact   LLE Light Touch Grossly intact   Bed Mobility   Sit to Supine 5  Supervision   Additional items Increased time required;Verbal cues   Additional Comments Pt requesting to return to bed at end of session; supervision for sit > supine   Transfers   Sit to Stand 5  Supervision   Additional items Increased time required;Verbal cues;Impulsive   Stand to Sit 5  Supervision   Additional items Increased time required;Verbal cues   Additional Comments RW for transfers with supervision and VC for hand placement and safety. Pt impulsive at times   Ambulation/Elevation   Gait pattern Improper Weight shift;Wide DENICE;Decreased foot clearance;Inconsistent cary;Foward flexed;Short stride;Excessively slow   Gait Assistance 4  Minimal assist   Additional items Assist x 1;Verbal cues   Assistive Device Rolling walker   Distance 30' with min A and RW. Pt impulsive trying to turn RW sideways to negotiate narrow space despite VC not to. See treatment section for additional gait trial   Balance   Static Sitting Good   Dynamic Sitting Fair +   Static Standing Fair   Dynamic Standing Fair -   Ambulatory Poor +   Endurance Deficit   Endurance Deficit Yes   Endurance Deficit Description fatigue   Activity Tolerance   Activity Tolerance Patient tolerated treatment well;Patient limited by fatigue   Medical Staff Made Aware Sukhi DAVALOS   Nurse Made Aware Meera WRAY   Assessment   Prognosis Good   Problem List Decreased strength;Decreased endurance;Impaired balance;Decreased mobility;Impaired judgement;Decreased safety  "awareness;Obesity;Pain   Barriers to Discharge Decreased caregiver support   Barriers to Discharge Comments pt reports dtr and spouse work during the day   Goals   Patient Goals \"Get pain medication\"   [facility] Expiration Date 08/01/25   PT Treatment Day 1   Plan   Treatment/Interventions Functional transfer training;LE strengthening/ROM;Elevations;Therapeutic exercise;Endurance training;Patient/family training;Equipment eval/education;Bed mobility;Gait training;Compensatory technique education;Spoke to nursing;OT   PT Frequency 3-5x/wk   Discharge Recommendation   Rehab Resource Intensity Level, PT II (Moderate Resource Intensity)   AM-PAC Basic Mobility Inpatient   Turning in Flat Bed Without Bedrails 4   Lying on Back to Sitting on Edge of Flat Bed Without Bedrails 3   Moving Bed to Chair 3   Standing Up From Chair Using Arms 3   Walk in Room 3   Climb 3-5 Stairs With Railing 2   Basic Mobility Inpatient Raw Score 18   Basic Mobility Standardized Score 41.05   [facility] Highest Level Of Mobility   JH-HLM Goal 6: Walk 10 steps or more   JH-HLM Achieved 7: Walk 25 feet or more   Additional Treatment Session   Start Time 1355   End Time 1410   Treatment Assessment Pt seen for PT treatment session this date with interventions consisting of gait training w/ emphasis on improving pt's ability to ambulate level surfaces x 120' with min A provided by therapist with RW. Pt agreeable to PT treatment session upon arrival, pt found seated OOB in recliner, in no apparent distress. In comparison to previous session, pt with improvements in pt ambulating increased distance this session, pt limited by fatigue. Post session: pt returned back to recliner, chair alarm engaged, all needs in reach, and RN notified of session findings/recommendations Continue to recommend Level II Resource Intensity at time of d/c in order to maximize pt's functional independence and safety w/ mobility. Pt continues to be functioning below baseline " level, and remains limited 2* factors listed above and including decreased strength, balance, mobility, and activity tolerance. PT will continue to see pt while here in order to address the deficits listed above and provide interventions consistent w/ POC in effort to achieve STGs.   Equipment Use Pt completed STS from BS with supervision initially however pt with moderate posterior LOB requiring mod A to correct, pt returned to seated. Pt ambulated 120' with RW and min A with short stride, inc forward flexion increasing with increased ambulation. Pt demonstrating visible signs of fatigue however refusing to turn. W/C follow for safety. PT ultimately instructed pt to sit for safety. Pt requesting to return to bed post session, see bed mobility section for details   Additional Treatment Day 1   End of Consult   Patient Position at End of Consult Supine;Bed/Chair alarm activated;All needs within reach     The patient's AM-PAC Basic Mobility Inpatient Short Form Raw Score is 18  . A Raw score of greater than 16 suggests the patient may benefit from discharge to home. Please also refer to the recommendation of the Physical Therapist for safe discharge planning.    (Please find full objective findings from PT assessment regarding body systems outlined above).     Assessment: Pt is a 54 y.o. female seen for PT evaluation s/p admission to [facility] on 7/16/2025 with Hypokalemia.  Order placed for PT services.  Upon evaluation: Pt is presenting with impaired functional mobility due to pain, decreased strength, decreased endurance, impaired balance, gait deviations, decreased safety awareness, impaired judgment, and fall risk requiring supervision assistance for bed mobility and transfers and min assistance for ambulation with RW. Pt's clinical presentation is currently unstable/unpredictable given the functional mobility deficits above coupled with fall risks as indicated by AM-PAC 6-Clicks: 18/24 as  well as hx of falls, impulsivity, impaired balance, polypharmacy, impaired judgement, decreased safety awareness, and obesity and combined with medical complications of hypertension , pain impacting overall mobility status, need for input for mobility technique/safety, abnormal CBC, abnormal H&H, abnormal WBCs, and abnormal renal lab values.  Pt's PMHx and comorbidities that may affect physical performance and progress include: CHF, CKD, Fibromyalgia, HTN, obesity, cancer history and/or treatment, and hx of liver cirrhosis. Personal factors affecting pt at time of IE include: step(s) to enter environment, multi-level environment, limited home support, past experience, behavioral pattern, inability to perform IADLs, inability to navigate level surfaces without external assistance, limited insight into impairments, and recent fall(s)/fall history. Pt will benefit from continued skilled PT services to address deficits as defined above and to maximize level of functional mobility to facilitate return toward PLOF and improved QOL. From PT/mobility standpoint, recommendation at time of d/c would be Level II (Moderate Resource Intensity pending progress in order to reduce fall risk and maximize pt's functional independence and consistency with mobility in order to facilitate return to PLOF.  Recommend trial with walker next 1-2 sessions and ther ex next 1-2 sessions.      Goals: Pt will: Perform bed mobility tasks with modified I to reposition in bed and prepare for transfers. Pt will perform transfers with modified I to increase Indep in home alone environment and prepare for ambulation. Pt will ambulate with RW for >/= 250' with  modified I  to improve gait quality and promote proper use of assistive device and to access home environment. Pt will complete >/= 12 steps with with unilateral handrail with Supervision to return to home with FABRICIO and return to multilevel home. Increase bilateral LE strength 1/2 grade to  facilitate independent mobility and Increase all balance 1/2 grade to decrease risk for falls.        Payal Castorena, PT,DPT                  [1]   Past Medical History:  Diagnosis Date    Back pain     Carcinoid tumor     neuroendocrine    CHF (congestive heart failure) (HCC)     Cirrhosis of liver (HCC)     Hypertension     Sarcoidosis

## 2025-07-18 NOTE — PROGRESS NOTES
Progress Note - Hospitalist   Name: Bailey Olsen 54 y.o. female I MRN: [id number]  Unit/Bed#: -01 I Date of Admission: 7/16/2025   Date of Service: 7/18/2025 I Hospital Day: 2    Assessment & Plan  Acute renal failure with tubular necrosis (HCC)  Baseline creatinine around 0.9  Creatinine is 1.7  Patient did not make enough urine  Suspect contrast induced ATN  Also running hypotensive, received IV hydration, albumin therapy, midodrine  Patient remained on Lasix drip-even with Lasix drip, patient is +7 19.2  Renal function trend up to 2.62-patient started making urine, discussed with nephrology, recommending continue Lasix drip.  Patient also has liver cirrhosis  Will discuss with nephrology for further management plan  Hypokalemia  Patient has chronic history of hypokalemia, does take potassium supplement  Potassium corrected  Fall  Came after fall  Shows left periorbital bruise, multiple bruises on right lateral chest, right buttock area, left knee  Patient also has T12 fracture, right third finger fracture  Follow-up PT OT recommendation  PainManagement, case management  For vertebral fracture, discussed with neurosurgery, recommending TLSO brace, upright positions,  Closed fracture of phalanx of right middle finger  Secondary to fall, imaging reviewed  Continue pain management, embolization, can use finger splint  Follow orthopedic recommendation  Continue diane tape per orthopedic recommendation  Closed fracture of twelfth thoracic vertebra (HCC)  There is a new mild superior endplate compression fracture at T12 compared to May 2025. Status post kyphoplasty at L1. There is a healing fracture of the right anterior fourth rib.   Appreciate neurosurgery recommendation, upright position, TLSO brace.    Has recent history of fall  Complaining of lower back pain  Continue pain management  No neurodeficit  Follow-up PT OT, neurosurgery recommended  Liver cirrhosis secondary to HYATT (nonalcoholic  steatohepatitis) (HCC)  Continue conservative management with home medications.  Pancytopenia (HCC)  Secondary to liver cirrhosis, continue to monitor, no active bleeding noted  Morbid obesity with BMI of 40.0-44.9, adult (HCC)  Lifestyle modification.  Chronic, continuous use of opioids  Will use narcotics, continue to monitor.    VTE Pharmacologic Prophylaxis: VTE Score: 5 High Risk (Score >/= 5) - Pharmacological DVT Prophylaxis Ordered: heparin. Sequential Compression Devices Ordered.    Mobility:   Basic Mobility Inpatient Raw Score: 19  JH-HLM Goal: 6: Walk 10 steps or more  JH-HLM Achieved: 6: Walk 10 steps or more  JH-HLM Goal achieved. Continue to encourage appropriate mobility.    Patient Centered Rounds: I performed bedside rounds with nursing staff today.   Discussions with Specialists or Other Care Team Provider: Nephrology    Education and Discussions with Family / Patient: Updated  () at bedside.    Current Length of Stay: 2 day(s)  Current Patient Status: Inpatient   Certification Statement: The patient will continue to require additional inpatient hospital stay due to acute renal failure, patient also remain on Lasix drip  Discharge Plan: Anticipate discharge in >72 hrs to to be determined    Code Status: Level 1 - Full Code    Subjective   Seen and evaluated during the event.  Sitting upright position, much more alert, and cooperative.  Still having some pain.    Objective :  Temp:  [97 °F (36.1 °C)-98.5 °F (36.9 °C)] 98.5 °F (36.9 °C)  HR:  [] 88  BP: ()/(36-74) 103/55  Resp:  [18-20] 20  SpO2:  [89 %-96 %] 93 %  O2 Device: None (Room air)    Body mass index is 41.81 kg/m².     Input and Output Summary (last 24 hours):     Intake/Output Summary (Last 24 hours) at 7/18/2025 0802  Last data filed at 7/18/2025 0707  Gross per 24 hour   Intake 480 ml   Output 700 ml   Net -220 ml       Physical Exam  Vitals and nursing note reviewed.   Constitutional:       Appearance:  She is obese.     Eyes:      Extraocular Movements: Extraocular movements intact.      Conjunctiva/sclera: Conjunctivae normal.      Pupils: Pupils are equal, round, and reactive to light.       Cardiovascular:      Rate and Rhythm: Normal rate.      Heart sounds: No murmur heard.     No friction rub. No gallop.   Pulmonary:      Effort: Pulmonary effort is normal. No respiratory distress.      Breath sounds: No stridor. No wheezing or rhonchi.   Abdominal:      General: There is no distension.      Palpations: There is no mass.      Tenderness: There is no abdominal tenderness.      Hernia: No hernia is present.     Musculoskeletal:         General: Tenderness (multiple sites) present.     Skin:     Findings: Bruising (multiple sites) present.     Neurological:      Mental Status: She is alert. Mental status is at baseline.         Lines/Drains:        Telemetry:  Telemetry Orders (From admission, onward)               24 Hour Telemetry Monitoring  Continuous x 24 Hours (Telem)        Expiring   Question:  Reason for 24 Hour Telemetry  Answer:  Decompensated CHF- and any one of the following: continuous diuretic infusion or total diuretic dose >200 mg daily, associated electrolyte derangement (I.e. K < 3.0), inotropic drip (continuous infusion), hx of ventricular arrhythmia, or new EF < 35%                     Telemetry Reviewed: Normal Sinus Rhythm  Indication for Continued Telemetry Use: Acute CHF on >200 mg lasix/day or equivalent dose or with new reduced EF.                Lab Results: I have reviewed the following results:   Results from last 7 days   Lab Units 07/18/25  0522   WBC Thousand/uL 2.25*   HEMOGLOBIN g/dL 8.3*   HEMATOCRIT % 27.0*   PLATELETS Thousands/uL 77*   SEGS PCT % 59   LYMPHO PCT % 32   MONO PCT % 8   EOS PCT % 0     Results from last 7 days   Lab Units 07/18/25  0522   SODIUM mmol/L 137   POTASSIUM mmol/L 3.8   CHLORIDE mmol/L 95*   CO2 mmol/L 31   BUN mg/dL 19   CREATININE mg/dL 2.62*    ANION GAP mmol/L 11   CALCIUM mg/dL 8.7   ALBUMIN g/dL 3.6   TOTAL BILIRUBIN mg/dL 2.82*   ALK PHOS U/L 169*   ALT U/L 21   AST U/L 72*   GLUCOSE RANDOM mg/dL 99     Results from last 7 days   Lab Units 07/16/25  1009   INR  1.95*                   Recent Cultures (last 7 days):         Imaging Results Review: No pertinent imaging studies reviewed.  Other Study Results Review: No additional pertinent studies reviewed.    Last 24 Hours Medication List:     Current Facility-Administered Medications:     amitriptyline (ELAVIL) tablet 50 mg, HS    cyclobenzaprine (FLEXERIL) tablet 10 mg, TID PRN    dapsone tablet 50 mg, Daily    folic acid (FOLVITE) tablet 1 mg, Daily    furosemide (LASIX) 500 mg infusion 50 mL, Continuous, Last Rate: 40 mg/hr (07/18/25 0316)    heparin (porcine) subcutaneous injection 5,000 Units, Q8H MIGUEL    lactulose (CHRONULAC) oral solution 30 g, 4x Daily    lidocaine (LIDODERM) 5 % patch 1 patch, Daily    [Held by provider] metoprolol tartrate (LOPRESSOR) tablet 25 mg, Q12H MIGUEL    octreotide (SandoSTATIN) injection 100 mcg, Q8H MIGUEL    oxyCODONE (ROXICODONE) immediate release tablet 10 mg, Q6H PRN    pantoprazole (PROTONIX) EC tablet 40 mg, Early Morning    polyethylene glycol (MIRALAX) packet 17 g, Daily    rifaximin (XIFAXAN) tablet 550 mg, Q12H MIGUEL    trimethobenzamide (TIGAN) IM injection 200 mg, Q6H PRN    ursodiol (ACTIGALL) capsule 300 mg, BID    Administrative Statements   Today, Patient Was Seen By: Ruslan Keyes MD      **Please Note: This note may have been constructed using a voice recognition system.**

## 2025-07-19 ENCOUNTER — APPOINTMENT (INPATIENT)
Dept: RADIOLOGY | Facility: HOSPITAL | Age: 55
DRG: 551 | End: 2025-07-19
Payer: MEDICARE

## 2025-07-19 LAB
ALBUMIN SERPL BCG-MCNC: 3.7 G/DL (ref 3.5–5)
ALP SERPL-CCNC: 194 U/L (ref 34–104)
ALT SERPL W P-5'-P-CCNC: 26 U/L (ref 7–52)
ANION GAP SERPL CALCULATED.3IONS-SCNC: 8 MMOL/L (ref 4–13)
AST SERPL W P-5'-P-CCNC: 65 U/L (ref 13–39)
BASOPHILS # BLD AUTO: 0.03 THOUSANDS/ÂΜL (ref 0–0.1)
BASOPHILS NFR BLD AUTO: 1 % (ref 0–1)
BILIRUB SERPL-MCNC: 3.39 MG/DL (ref 0.2–1)
BUN SERPL-MCNC: 15 MG/DL (ref 5–25)
CALCIUM SERPL-MCNC: 8.9 MG/DL (ref 8.4–10.2)
CHLORIDE SERPL-SCNC: 94 MMOL/L (ref 96–108)
CK SERPL-CCNC: 71 U/L (ref 26–192)
CO2 SERPL-SCNC: 41 MMOL/L (ref 21–32)
CREAT SERPL-MCNC: 1.54 MG/DL (ref 0.6–1.3)
EOSINOPHIL # BLD AUTO: 0.01 THOUSAND/ÂΜL (ref 0–0.61)
EOSINOPHIL NFR BLD AUTO: 0 % (ref 0–6)
ERYTHROCYTE [DISTWIDTH] IN BLOOD BY AUTOMATED COUNT: 19.1 % (ref 11.6–15.1)
GFR SERPL CREATININE-BSD FRML MDRD: 37 ML/MIN/1.73SQ M
GLUCOSE SERPL-MCNC: 125 MG/DL (ref 65–140)
HCT VFR BLD AUTO: 28.7 % (ref 34.8–46.1)
HGB BLD-MCNC: 9 G/DL (ref 11.5–15.4)
IMM GRANULOCYTES # BLD AUTO: 0.01 THOUSAND/UL (ref 0–0.2)
IMM GRANULOCYTES NFR BLD AUTO: 0 % (ref 0–2)
LYMPHOCYTES # BLD AUTO: 0.34 THOUSANDS/ÂΜL (ref 0.6–4.47)
LYMPHOCYTES NFR BLD AUTO: 11 % (ref 14–44)
MAGNESIUM SERPL-MCNC: 1.5 MG/DL (ref 1.9–2.7)
MCH RBC QN AUTO: 30.7 PG (ref 26.8–34.3)
MCHC RBC AUTO-ENTMCNC: 31.4 G/DL (ref 31.4–37.4)
MCV RBC AUTO: 98 FL (ref 82–98)
MONOCYTES # BLD AUTO: 0.3 THOUSAND/ÂΜL (ref 0.17–1.22)
MONOCYTES NFR BLD AUTO: 10 % (ref 4–12)
NEUTROPHILS # BLD AUTO: 2.47 THOUSANDS/ÂΜL (ref 1.85–7.62)
NEUTS SEG NFR BLD AUTO: 78 % (ref 43–75)
NRBC BLD AUTO-RTO: 0 /100 WBCS
PLATELET # BLD AUTO: 72 THOUSANDS/UL (ref 149–390)
PMV BLD AUTO: 10.1 FL (ref 8.9–12.7)
POTASSIUM SERPL-SCNC: 3.3 MMOL/L (ref 3.5–5.3)
PROT SERPL-MCNC: 6.9 G/DL (ref 6.4–8.4)
RBC # BLD AUTO: 2.93 MILLION/UL (ref 3.81–5.12)
SODIUM SERPL-SCNC: 143 MMOL/L (ref 135–147)
WBC # BLD AUTO: 3.16 THOUSAND/UL (ref 4.31–10.16)

## 2025-07-19 PROCEDURE — 85025 COMPLETE CBC W/AUTO DIFF WBC: CPT | Performed by: FAMILY MEDICINE

## 2025-07-19 PROCEDURE — 73030 X-RAY EXAM OF SHOULDER: CPT

## 2025-07-19 PROCEDURE — 99232 SBSQ HOSP IP/OBS MODERATE 35: CPT | Performed by: NURSE PRACTITIONER

## 2025-07-19 PROCEDURE — 71101 X-RAY EXAM UNILAT RIBS/CHEST: CPT

## 2025-07-19 PROCEDURE — 99232 SBSQ HOSP IP/OBS MODERATE 35: CPT | Performed by: FAMILY MEDICINE

## 2025-07-19 PROCEDURE — 80053 COMPREHEN METABOLIC PANEL: CPT | Performed by: FAMILY MEDICINE

## 2025-07-19 PROCEDURE — 82550 ASSAY OF CK (CPK): CPT | Performed by: NURSE PRACTITIONER

## 2025-07-19 PROCEDURE — 83735 ASSAY OF MAGNESIUM: CPT | Performed by: NURSE PRACTITIONER

## 2025-07-19 RX ORDER — POTASSIUM CHLORIDE 1500 MG/1
20 TABLET, EXTENDED RELEASE ORAL EVERY 4 HOURS
Status: COMPLETED | OUTPATIENT
Start: 2025-07-19 | End: 2025-07-19

## 2025-07-19 RX ORDER — LIDOCAINE 50 MG/G
1 PATCH TOPICAL DAILY
Status: DISCONTINUED | OUTPATIENT
Start: 2025-07-19 | End: 2025-07-22 | Stop reason: HOSPADM

## 2025-07-19 RX ORDER — HYDROMORPHONE HCL/PF 1 MG/ML
0.5 SYRINGE (ML) INJECTION ONCE
Refills: 0 | Status: COMPLETED | OUTPATIENT
Start: 2025-07-19 | End: 2025-07-19

## 2025-07-19 RX ORDER — OXYCODONE HYDROCHLORIDE 5 MG/1
5 TABLET ORAL ONCE
Refills: 0 | Status: DISCONTINUED | OUTPATIENT
Start: 2025-07-19 | End: 2025-07-19

## 2025-07-19 RX ORDER — MAGNESIUM SULFATE HEPTAHYDRATE 40 MG/ML
2 INJECTION, SOLUTION INTRAVENOUS ONCE
Status: COMPLETED | OUTPATIENT
Start: 2025-07-19 | End: 2025-07-19

## 2025-07-19 RX ORDER — OXYCODONE HYDROCHLORIDE 5 MG/1
5 TABLET ORAL ONCE
Status: COMPLETED | OUTPATIENT
Start: 2025-07-19 | End: 2025-07-19

## 2025-07-19 RX ORDER — POTASSIUM CHLORIDE 1500 MG/1
40 TABLET, EXTENDED RELEASE ORAL ONCE
Status: COMPLETED | OUTPATIENT
Start: 2025-07-19 | End: 2025-07-19

## 2025-07-19 RX ADMIN — POTASSIUM CHLORIDE 20 MEQ: 1500 TABLET, EXTENDED RELEASE ORAL at 13:10

## 2025-07-19 RX ADMIN — HEPARIN SODIUM 5000 UNITS: 5000 INJECTION INTRAVENOUS; SUBCUTANEOUS at 21:57

## 2025-07-19 RX ADMIN — LACTULOSE 30 G: 10 SOLUTION ORAL at 21:57

## 2025-07-19 RX ADMIN — POTASSIUM CHLORIDE 20 MEQ: 1500 TABLET, EXTENDED RELEASE ORAL at 10:05

## 2025-07-19 RX ADMIN — MAGNESIUM SULFATE HEPTAHYDRATE 2 G: 2 INJECTION, SOLUTION INTRAVENOUS at 10:05

## 2025-07-19 RX ADMIN — HYDROMORPHONE HYDROCHLORIDE 0.5 MG: 1 INJECTION, SOLUTION INTRAMUSCULAR; INTRAVENOUS; SUBCUTANEOUS at 06:16

## 2025-07-19 RX ADMIN — URSODIOL 300 MG: 300 CAPSULE ORAL at 17:08

## 2025-07-19 RX ADMIN — OXYCODONE HYDROCHLORIDE 5 MG: 5 TABLET ORAL at 08:32

## 2025-07-19 RX ADMIN — OXYCODONE HYDROCHLORIDE 10 MG: 10 TABLET ORAL at 19:46

## 2025-07-19 RX ADMIN — PANTOPRAZOLE SODIUM 40 MG: 40 TABLET, DELAYED RELEASE ORAL at 05:52

## 2025-07-19 RX ADMIN — FOLIC ACID 1 MG: 1 TABLET ORAL at 08:32

## 2025-07-19 RX ADMIN — RIFAXIMIN 550 MG: 550 TABLET ORAL at 08:32

## 2025-07-19 RX ADMIN — AMITRIPTYLINE HYDROCHLORIDE 50 MG: 25 TABLET, FILM COATED ORAL at 21:57

## 2025-07-19 RX ADMIN — POTASSIUM CHLORIDE 40 MEQ: 1500 TABLET, EXTENDED RELEASE ORAL at 00:31

## 2025-07-19 RX ADMIN — Medication 40 MG/HR: at 03:21

## 2025-07-19 RX ADMIN — OCTREOTIDE ACETATE 100 MCG: 100 INJECTION, SOLUTION INTRAVENOUS; SUBCUTANEOUS at 05:53

## 2025-07-19 RX ADMIN — URSODIOL 300 MG: 300 CAPSULE ORAL at 08:32

## 2025-07-19 RX ADMIN — POTASSIUM CHLORIDE 20 MEQ: 1500 TABLET, EXTENDED RELEASE ORAL at 17:08

## 2025-07-19 RX ADMIN — LACTULOSE 30 G: 10 SOLUTION ORAL at 13:09

## 2025-07-19 RX ADMIN — TRIMETHOBENZAMIDE HYDROCHLORIDE 200 MG: 100 INJECTION INTRAMUSCULAR at 03:17

## 2025-07-19 RX ADMIN — HEPARIN SODIUM 5000 UNITS: 5000 INJECTION INTRAVENOUS; SUBCUTANEOUS at 05:52

## 2025-07-19 RX ADMIN — RIFAXIMIN 550 MG: 550 TABLET ORAL at 21:57

## 2025-07-19 RX ADMIN — LIDOCAINE 5% 1 PATCH: 700 PATCH TOPICAL at 11:02

## 2025-07-19 RX ADMIN — DAPSONE 50 MG: 25 TABLET ORAL at 08:39

## 2025-07-19 RX ADMIN — LACTULOSE 30 G: 10 SOLUTION ORAL at 08:37

## 2025-07-19 RX ADMIN — POLYETHYLENE GLYCOL 3350 17 G: 17 POWDER, FOR SOLUTION ORAL at 08:34

## 2025-07-19 RX ADMIN — OXYCODONE HYDROCHLORIDE 10 MG: 10 TABLET ORAL at 03:17

## 2025-07-19 RX ADMIN — HEPARIN SODIUM 5000 UNITS: 5000 INJECTION INTRAVENOUS; SUBCUTANEOUS at 13:10

## 2025-07-19 NOTE — PROGRESS NOTES
Progress Note - Hospitalist   Name: Bailey Olsen 54 y.o. female I MRN: [id number]  Unit/Bed#: -01 I Date of Admission: 7/16/2025   Date of Service: 7/19/2025 I Hospital Day: 3    Assessment & Plan  Acute renal failure with tubular necrosis (HCC)  Baseline creatinine around 0.9  Creatinine is 1.7  Patient did not make enough urine  Suspect contrast induced ATN-secondary to contrast exposure, hypertension, medication side effect  Initially patient started Lasix drip with significant improvement, renal function is improving.  Lasix drip discontinued  Hypokalemia  Chronic hypokalemia, continue to supplement  Fall  Came after fall  Shows left periorbital bruise, multiple bruises on right lateral chest, right buttock area, left knee  Patient also has T12 fracture, right third finger fracture  PainManagement, case management  For vertebral fracture, discussed with neurosurgery, recommending TLSO brace, upright positions,  PT OT recommending level 2  7/19/25:Complaining of left upper abdominal and chest pain, early morning a piece of wall fell off on her chest and left shoulder area.  -X-ray of the left shoulder and left lateral ribs reviewed, no fracture noted (official read pending)  Closed fracture of phalanx of right middle finger  Secondary to fall, imaging reviewed  Continue pain management, embolization, can use finger splint  Follow orthopedic recommendation  Continue diane tape per orthopedic recommendation  Closed fracture of twelfth thoracic vertebra (HCC)  There is a new mild superior endplate compression fracture at T12 compared to May 2025. Status post kyphoplasty at L1. There is a healing fracture of the right anterior fourth rib.   Appreciate neurosurgery recommendation, upright position, TLSO brace.    Has recent history of fall  Complaining of lower back pain  Continue pain management  No neurodeficit  Due to recommending level 2  Liver cirrhosis secondary to HYATT (nonalcoholic steatohepatitis)  (HCC)  Continue conservative management with home medications.  Pancytopenia (HCC)  Secondary to liver cirrhosis, continue to monitor, no active bleeding noted  Morbid obesity with BMI of 40.0-44.9, adult (HCC)  Lifestyle modification.  Chronic, continuous use of opioids  Will use narcotics, continue to monitor.  Oliguria      VTE Pharmacologic Prophylaxis: VTE Score: 5 High Risk (Score >/= 5) - Pharmacological DVT Prophylaxis Ordered: heparin. Sequential Compression Devices Ordered.    Mobility:   Basic Mobility Inpatient Raw Score: 18  JH-HLM Goal: 6: Walk 10 steps or more  JH-HLM Achieved: 6: Walk 10 steps or more  JH-HLM Goal achieved. Continue to encourage appropriate mobility.    Patient Centered Rounds: I performed bedside rounds with nursing staff today.   Discussions with Specialists or Other Care Team Provider: Nephrology    Education and Discussions with Family / Patient: Updated  (, daughter, and sister) at bedside.    Current Length of Stay: 3 day(s)  Current Patient Status: Inpatient   Certification Statement: The patient will continue to require additional inpatient hospital stay due to KISHORE, T12 fracture, kidney function impairment, patient will need placement  Discharge Plan: Anticipate discharge in 24-48 hrs to rehab facility.    Code Status: Level 1 - Full Code    Subjective   Seen and evaluated during the [place] resting comfortably.  Complaining of left upper abdominal and chest pain, early morning a piece of wall fell off on her chest and left shoulder area.      Objective :  Temp:  [95.2 °F (35.1 °C)-97.5 °F (36.4 °C)] 97 °F (36.1 °C)  HR:  [] 110  BP: (109-143)/(55-71) 143/71  Resp:  [16-20] 16  SpO2:  [82 %-94 %] 90 %  O2 Device: None (Room air)    Body mass index is 39.68 kg/m².     Input and Output Summary (last 24 hours):     Intake/Output Summary (Last 24 hours) at 7/19/2025 0944  Last data filed at 7/19/2025 0851  Gross per 24 hour   Intake 1430.2 ml   Output  5030 ml   Net -3599.8 ml       Physical Exam  Vitals and nursing note reviewed. Exam conducted with a chaperone present.   Constitutional:       Appearance: She is obese. She is not ill-appearing or diaphoretic.     Eyes:      Extraocular Movements: Extraocular movements intact.      Pupils: Pupils are equal, round, and reactive to light.       Cardiovascular:      Rate and Rhythm: Normal rate.      Heart sounds:      No friction rub. No gallop.   Pulmonary:      Effort: Pulmonary effort is normal. No respiratory distress.      Breath sounds: No stridor. No wheezing or rhonchi.   Abdominal:      General: There is no distension.      Palpations: There is no mass.      Tenderness: There is no abdominal tenderness.      Hernia: No hernia is present.     Musculoskeletal:         General: Tenderness (multiple sites of the body) present.      Right lower leg: No edema.     Skin:     Findings: Bruising (Patient has bruise on left periorbital area, left upper arm and backside, right lateral chest, left knee-which were present since admission due to fall.) present.     Neurological:      Mental Status: She is alert and oriented to person, place, and time.      Cranial Nerves: No cranial nerve deficit.      Sensory: No sensory deficit.      Motor: No weakness.      Coordination: Coordination normal.           Lines/Drains:              Lab Results: I have reviewed the following results:   Results from last 7 days   Lab Units 07/19/25  0601   WBC Thousand/uL 3.16*   HEMOGLOBIN g/dL 9.0*   HEMATOCRIT % 28.7*   PLATELETS Thousands/uL 72*   SEGS PCT % 78*   LYMPHO PCT % 11*   MONO PCT % 10   EOS PCT % 0     Results from last 7 days   Lab Units 07/19/25  0601   SODIUM mmol/L 143   POTASSIUM mmol/L 3.3*   CHLORIDE mmol/L 94*   CO2 mmol/L 41*   BUN mg/dL 15   CREATININE mg/dL 1.54*   ANION GAP mmol/L 8   CALCIUM mg/dL 8.9   ALBUMIN g/dL 3.7   TOTAL BILIRUBIN mg/dL 3.39*   ALK PHOS U/L 194*   ALT U/L 26   AST U/L 65*   GLUCOSE RANDOM  mg/dL 125     Results from last 7 days   Lab Units 07/16/25  1009   INR  1.95*                   Recent Cultures (last 7 days):         Imaging Results Review: No pertinent imaging studies reviewed.  Other Study Results Review: No additional pertinent studies reviewed.    Last 24 Hours Medication List:     Current Facility-Administered Medications:     amitriptyline (ELAVIL) tablet 50 mg, HS    cyclobenzaprine (FLEXERIL) tablet 10 mg, TID PRN    dapsone tablet 50 mg, Daily    folic acid (FOLVITE) tablet 1 mg, Daily    heparin (porcine) subcutaneous injection 5,000 Units, Q8H MIGUEL    lactulose (CHRONULAC) oral solution 30 g, 4x Daily    lidocaine (LIDODERM) 5 % patch 1 patch, Daily    magnesium sulfate 2 g/50 mL IVPB (premix) 2 g, Once    [Held by provider] metoprolol tartrate (LOPRESSOR) tablet 25 mg, Q12H MIGUEL    oxyCODONE (ROXICODONE) immediate release tablet 10 mg, Q6H PRN    pantoprazole (PROTONIX) EC tablet 40 mg, Early Morning    polyethylene glycol (MIRALAX) packet 17 g, Daily    potassium chloride (Klor-Con M20) CR tablet 20 mEq, Q4H    rifaximin (XIFAXAN) tablet 550 mg, Q12H MIGUEL    trimethobenzamide (TIGAN) IM injection 200 mg, Q6H PRN    ursodiol (ACTIGALL) capsule 300 mg, BID    Administrative Statements   Today, Patient Was Seen By: Ruslan Keyes MD      **Please Note: This note may have been constructed using a voice recognition system.**

## 2025-07-19 NOTE — ASSESSMENT & PLAN NOTE
Baseline creatinine 0.9-1.1 mg/dL     Oliguric ATN KISHORE (contrast, Toradol, hypotension, lisinopril, spironolactone) versus HRS.  Status post Lasix stress test with robust urine output roughly 5.4 L in 24 hours.  Creatinine improving.      Recommendations 7/19: Lasix infusion discontinued by nephrologist.  Continue to avoid NSAID, contrast.  Continue to hold lisinopril..  Replace electrolytes.  Further evaluation of abdominal pain per primary team.  Add on CK for completeness given fall.  Resume oral diuretics when clinically indicated-hold for now

## 2025-07-19 NOTE — CASE MANAGEMENT
Case Management Discharge Planning Note    Patient name Bailey Olsen  Location /-01 MRN [id number]  : 1970 Date 2025       Current Admission Date: 2025  Current Admission Diagnosis:Hypokalemia   Patient Active Problem List    Diagnosis Date Noted    Oliguria 2025    Acute renal failure with tubular necrosis (HCC) 2025    ATN (acute tubular necrosis) (HCC) 2025    Closed fracture of phalanx of right middle finger 2025    Closed fracture of twelfth thoracic vertebra (HCC) 2025    Stage 3a chronic kidney disease (HCC) 2025    Scalp hematoma 2025    Contusion of back 2025    Ecchymosis 2025    Falls 2025    Chest pain 2025    Abdominal pain 2025    Acute kidney injury (HCC) 2025    Gram-negative bacteremia 2025    Acute respiratory failure (HCC) 2025    Carcinoid tumor of pancreas 2025    Hypomagnesemia 2025    Upper GI bleed 2025    Acute anemia 2025    Rectal bleeding 2025    Acute blood loss anemia 2025    Chronic, continuous use of opioids 2025    Acute metabolic encephalopathy 2025    SIRS (systemic inflammatory response syndrome) ([facility]) 2025    Sacral fracture, closed ([facility]) 11/15/2024    KISHORE (acute kidney injury) ([facility]) 10/04/2024    Morbid obesity with BMI of 40.0-44.9, adult ([facility]) 10/04/2024    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) 2024    Fall from standing 2024    Closed compression fracture of L1 lumbar vertebra, sequela 2024    Closed stable burst fracture of first lumbar vertebra with routine healing 2024    Groin hematoma 2024    Ambulatory dysfunction 2024    Intractable back pain 2024    Fall 2024    Liver cirrhosis secondary to HYATT (nonalcoholic steatohepatitis) (HCC) 2024    History of malignant carcinoid tumor of small intestine 2024     Class 3 severe obesity due to excess calories in adult 05/26/2024    Primary hypertension 05/26/2024    Hypokalemia 05/26/2024    Fibromyalgia 05/26/2024    Iron deficiency anemia 05/26/2024    Pancytopenia (HCC) 05/26/2024    Sarcoidosis 05/26/2024    Bilateral pulmonary infiltrates 05/26/2024      LOS (days): 3  Geometric Mean LOS (GMLOS) (days): 4.6  Days to GMLOS:1.6     OBJECTIVE:  Risk of Unplanned Readmission Score: 67.26         Current admission status: Inpatient   Preferred Pharmacy:   [facility] - [place] - [address]  [address] [address]  Phone: [phone] Fax: [phone]    [id number] - [place] - [address] [facility] [address] [id number]  Phone: [phone] Fax: [phone]    Primary Care Provider: Nayely Brown DO    Primary Insurance: BLUE CROSS  Secondary Insurance:     DISCHARGE DETAILS:    Discharge planning discussed with:: patient  Freedom of Choice: Yes  Comments - Freedom of Choice: care team recommendations for STR discussed, pt agreeable to place blanket referral in aidin for STR                               Other Referral/Resources/Interventions Provided:  Interventions: Short Term Rehab         Treatment Team Recommendation: Short Term Rehab     Additional Discharge Dispositions: Short Term Rehab

## 2025-07-19 NOTE — PROGRESS NOTES
[facility]  Progress Note   Bailey Olsen 54 y.o. female MRN: [id number]  Unit/Bed#: -01 Encounter: [id number]        Assessment & Plan  Acute renal failure with tubular necrosis (HCC)  Baseline creatinine 0.9-1.1 mg/dL     Oliguric ATN KISHORE (contrast, Toradol, hypotension, lisinopril, spironolactone) versus HRS.  Status post Lasix stress test with robust urine output roughly 5.4 L in 24 hours.  Creatinine improving.      Recommendations 7/19: Lasix infusion discontinued by nephrologist.  Continue to avoid NSAID, contrast.  Continue to hold lisinopril..  Replace electrolytes.  Further evaluation of abdominal pain per primary team.  Add on CK for completeness given fall.  Resume oral diuretics when clinically indicated-hold for now  ATN (acute tubular necrosis) (HCC)    KISHORE due to ATN as above    Liver cirrhosis secondary to HYATT (nonalcoholic steatohepatitis) (HCC)    History of cirrhosis.  Abdominal pain worse today per patient report.  Will be evaluated by primary team.    Oliguria  Resolved status post Lasix stress test.  Robust urine output in the last 24 hours      SUBJECTIVE / 24H INTERVAL HISTORY:  Complains of severe abdominal pain    Historical Information   Past Medical History[1]  Past Surgical History[2]  Social History   Social History     Substance and Sexual Activity   Alcohol Use Never     Social History     Substance and Sexual Activity   Drug Use Never     Tobacco Use History[3]    Family History:   Family History[4]    Medications:  Pertinent medications were reviewed  Current Facility-Administered Medications   Medication Dose Route Frequency Provider Last Rate    amitriptyline  50 mg Oral HS Ruslan Keyes MD      cyclobenzaprine  10 mg Oral TID PRN Ruslan Keyes MD      dapsone  50 mg Oral Daily Ruslan Keyes MD      folic acid  1 mg Oral Daily Ruslan Keyes MD      heparin (porcine)  5,000 Units Subcutaneous Q8H [facility] Ruslan Keyes  MD      lactulose  30 g Oral 4x Daily Ruslan Keyes MD      lidocaine  1 patch Topical Daily Ruslan Keyes MD      magnesium sulfate  2 g Intravenous Once MAGDA Gann      [Held by provider] metoprolol tartrate  25 mg Oral Q12H [facility] Ruslan Keyes MD      oxyCODONE  10 mg Oral Q6H PRN Ruslan Keyes MD      pantoprazole  40 mg Oral Early Morning Ruslan Keyes MD      polyethylene glycol  17 g Oral Daily Ruslan Keyes MD      potassium chloride  20 mEq Oral Q4H MAGDA Gann      rifaximin  550 mg Oral Q12H [facility] Ruslan Keyes MD      trimethobenzamide  200 mg Intramuscular Q6H PRN Ruslan Keyes MD      ursodiol  300 mg Oral BID Ruslan Keyes MD           Allergies[5]      Vitals:   /71   Pulse (!) 110   Temp (!) 97 °F (36.1 °C)   Resp 16   Wt 112 kg (245 lb 13 oz)   SpO2 90%   BMI 39.68 kg/m²   Body mass index is 39.68 kg/m².  SpO2: 90 %,   SpO2 Activity: At Rest,   O2 Device: None (Room air)      Intake/Output Summary (Last 24 hours) at 7/19/2025 0900  Last data filed at 7/19/2025 0851  Gross per 24 hour   Intake 1430.2 ml   Output 5030 ml   Net -3599.8 ml     Invasive Devices       Peripheral Intravenous Line  Duration             Peripheral IV 07/16/25 Left Antecubital 2 days                    Physical Exam  General: conscious, cooperative, in no acute distress  Eyes: conjunctivae pink, anicteric sclerae  ENT: lips and mucous membranes moist  Neck: supple, no JVD, no masses  Chest: Diminished breath sounds bilaterally, no crackles, ronchus or wheezings  CVS: S1 & S2, normal rate, regular rhythm  Abdomen: soft, diffusely tender, distended, bowel sounds present, obese, rounded  Extremities: Moderate edema of both legs  Skin: no rash scattered ecchymosis  Neuro: awake, alert, oriented    Diagnostic Data:  Lab: I have personally reviewed pertinent lab results.  CBC:  Results from last 7 days   Lab Units 07/19/25  0601   WBC Thousand/uL 3.16*   HEMOGLOBIN g/dL 9.0*  "  HEMATOCRIT % 28.7*   PLATELETS Thousands/uL 72*      CMP:   Lab Results   Component Value Date    SODIUM 143 07/19/2025    K 3.3 (L) 07/19/2025    CL 94 (L) 07/19/2025    CO2 41 (H) 07/19/2025    BUN 15 07/19/2025    CREATININE 1.54 (H) 07/19/2025    CALCIUM 8.9 07/19/2025    AST 65 (H) 07/19/2025    ALT 26 07/19/2025    ALKPHOS 194 (H) 07/19/2025    EGFR 37 07/19/2025   ,   PT/INR: No results found for: \"PT\", \"INR\",   Magnesium: No components found for: \"MAG\",  Phosphorous: No results found for: \"PHOS\"    Microbiology:  @LABRCNTIP,(urinecx:7)@        MAGDA Gann    Portions of the record may have been created with voice recognition software. Occasional wrong word or \"sound a like\" substitutions may have occurred due to the inherent limitations of voice recognition software. Read the chart carefully and recognize, using context, where substitutions have occurred.       [1]   Past Medical History:  Diagnosis Date    Back pain     Carcinoid tumor     neuroendocrine    CHF (congestive heart failure) (HCC)     Cirrhosis of liver (HCC)     Hypertension     Sarcoidosis    [2]   Past Surgical History:  Procedure Laterality Date    CHOLECYSTECTOMY      GASTRIC BYPASS      HERNIA REPAIR      IR KYPHOPLASTY/VERTEBROPLASTY  10/15/2024   [3]   Social History  Tobacco Use   Smoking Status Never   Smokeless Tobacco Never   [4]   Family History  Problem Relation Name Age of Onset    Hypertension Father      Heart attack Father      No Known Problems Sister      No Known Problems Brother      No Known Problems Brother      No Known Problems Brother      No Known Problems Brother      No Known Problems Brother      No Known Problems Daughter     [5]   Allergies  Allergen Reactions    Zolpidem GI Intolerance, Other (See Comments) and Hives     Other Reaction(s): Other      Blacks out    Blacks out    Tapentadol Sneezing and Other (See Comments)     Zoned out    Alprazolam Other (See Comments)     Other Reaction(s): Other      " Feels zoned out    Feels zoned out    Amoxicillin-Pot Clavulanate Hives and Nausea Only     Amoxil fine    Dust Mite Extract Sneezing    Morphine Headache, Nausea Only and Vomiting    Tramadol GI Intolerance    Azelastine Rash    Azelastine Hcl Rash    Molds & Smuts Rash     Other Reaction(s): Unknown

## 2025-07-19 NOTE — ASSESSMENT & PLAN NOTE
There is a new mild superior endplate compression fracture at T12 compared to May 2025. Status post kyphoplasty at L1. There is a healing fracture of the right anterior fourth rib.   Appreciate neurosurgery recommendation, upright position, TLSO brace.    Has recent history of fall  Complaining of lower back pain  Continue pain management  No neurodeficit  Due to recommending level 2

## 2025-07-19 NOTE — ASSESSMENT & PLAN NOTE
Baseline creatinine around 0.9  Creatinine is 1.7  Patient did not make enough urine  Suspect contrast induced ATN-secondary to contrast exposure, hypertension, medication side effect  Initially patient started Lasix drip with significant improvement, renal function is improving.  Lasix drip discontinued

## 2025-07-19 NOTE — PLAN OF CARE
Problem: PAIN - ADULT  Goal: Verbalizes/displays adequate comfort level or baseline comfort level  Description: Interventions:  - Encourage patient to monitor pain and request assistance  - Assess pain using appropriate pain scale  - Administer analgesics as ordered based on type and severity of pain and evaluate response  - Implement non-pharmacological measures as appropriate and evaluate response  - Consider cultural and social influences on pain and pain management  - Notify physician/advanced practitioner if interventions unsuccessful or patient reports new pain  - Educate patient/family on pain management process including their role and importance of  reporting pain   - Provide non-pharmacologic/complimentary pain relief interventions  Outcome: Progressing     Problem: INFECTION - ADULT  Goal: Absence or prevention of progression during hospitalization  Description: INTERVENTIONS:  - Assess and monitor for signs and symptoms of infection  - Monitor lab/diagnostic results  - Monitor all insertion sites, i.e. indwelling lines, tubes, and drains  - Monitor endotracheal if appropriate and nasal secretions for changes in amount and color  - [place] appropriate cooling/warming therapies per order  - Administer medications as ordered  - Instruct and encourage patient and family to use good hand hygiene technique  - Identify and instruct in appropriate isolation precautions for identified infection/condition  Outcome: Progressing     Problem: SAFETY ADULT  Goal: Patient will remain free of falls  Description: INTERVENTIONS:  - Educate patient/family on patient safety including physical limitations  - Instruct patient to call for assistance with activity   - Consider consulting OT/PT to assist with strengthening/mobility based on AM PAC & JH-HLM score  - Consult OT/PT to assist with strengthening/mobility   - Keep Call bell within reach  - Keep bed low and locked with side rails adjusted as appropriate  - Keep  care items and personal belongings within reach  - Initiate and maintain comfort rounds  - Make Fall Risk Sign visible to staff  - Offer Toileting every 2 Hours, in advance of need  - Initiate/Maintain bed alarm  - Obtain necessary fall risk management equipment:   - Apply yellow socks and bracelet for high fall risk patients  - Consider moving patient to room near nurses station  Outcome: Progressing  Goal: Maintain or return to baseline ADL function  Description: INTERVENTIONS:  -  Assess patient's ability to carry out ADLs; assess patient's baseline for ADL function and identify physical deficits which impact ability to perform ADLs (bathing, care of mouth/teeth, toileting, grooming, dressing, etc.)  - Assess/evaluate cause of self-care deficits   - Assess range of motion  - Assess patient's mobility; develop plan if impaired  - Assess patient's need for assistive devices and provide as appropriate  - Encourage maximum independence but intervene and supervise when necessary  - Involve family in performance of ADLs  - Assess for home care needs following discharge   - Consider OT consult to assist with ADL evaluation and planning for discharge  - Provide patient education as appropriate  - Monitor functional capacity and physical performance, use of AM PAC & JH-HLM   - Monitor gait, balance and fatigue with ambulation    Outcome: Progressing  Goal: Maintains/Returns to pre admission functional level  Description: INTERVENTIONS:  - Perform AM-PAC 6 Click Basic Mobility/ Daily Activity assessment daily.  - Set and communicate daily mobility goal to care team and patient/family/caregiver.   - Collaborate with rehabilitation services on mobility goals if consulted  - Perform Range of Motion 4 times a day.  - Reposition patient every 2 hours.  - Dangle patient 2 times a day  - Stand patient 2 times a day  - Ambulate patient 2 times a day  - Out of bed to chair 2 times a day   - Out of bed for meals 2 times a day  - Out  of bed for toileting  - Record patient progress and toleration of activity level   Outcome: Progressing     Problem: DISCHARGE PLANNING  Goal: Discharge to home or other facility with appropriate resources  Description: INTERVENTIONS:  - Identify barriers to discharge w/patient and caregiver  - Arrange for needed discharge resources and transportation as appropriate  - Identify discharge learning needs (meds, wound care, etc.)  - Arrange for interpretive services to assist at discharge as needed  - Refer to Case Management Department for coordinating discharge planning if the patient needs post-hospital services based on physician/advanced practitioner order or complex needs related to functional status, cognitive ability, or social support system  Outcome: Progressing     Problem: Knowledge Deficit  Goal: Patient/family/caregiver demonstrates understanding of disease process, treatment plan, medications, and discharge instructions  Description: Complete learning assessment and assess knowledge base.  Interventions:  - Provide teaching at level of understanding  - Provide teaching via preferred learning methods  Outcome: Progressing     Problem: METABOLIC, FLUID AND ELECTROLYTES - ADULT  Goal: Electrolytes maintained within normal limits  Description: INTERVENTIONS:  - Monitor labs and assess patient for signs and symptoms of electrolyte imbalances  - Administer electrolyte replacement as ordered  - Monitor response to electrolyte replacements, including repeat lab results as appropriate  - Instruct patient on fluid and nutrition as appropriate  Outcome: Progressing  Goal: Fluid balance maintained  Description: INTERVENTIONS:  - Monitor labs   - Monitor I/O and WT  - Instruct patient on fluid and nutrition as appropriate  - Assess for signs & symptoms of volume excess or deficit  Outcome: Progressing  Goal: Glucose maintained within target range  Description: INTERVENTIONS:  - Monitor Blood Glucose as ordered  -  Assess for signs and symptoms of hyperglycemia and hypoglycemia  - Administer ordered medications to maintain glucose within target range  - Assess nutritional intake and initiate nutrition service referral as needed  Outcome: Progressing     Problem: Prexisting or High Potential for Compromised Skin Integrity  Goal: Skin integrity is maintained or improved  Description: INTERVENTIONS:  - Identify patients at risk for skin breakdown  - Assess and monitor skin integrity including under and around medical devices   - Assess and monitor nutrition and hydration status  - Monitor labs  - Assess for incontinence   - Turn and reposition patient  - Assist with mobility/ambulation  - Relieve pressure over mariano prominences   - Avoid friction and shearing  - Provide appropriate hygiene as needed including keeping skin clean and dry  - Evaluate need for skin moisturizer/barrier cream  - Collaborate with interdisciplinary team  - Patient/family teaching  - Consider wound care consult    Assess:  - Review Royce scale daily  - Clean and moisturize skin every shift   - Inspect skin when repositioning, toileting, and assisting with ADLS  - Assess extremities for adequate circulation and sensation     Bed Management:  - Have minimal linens on bed & keep smooth, unwrinkled  - Change linens as needed when moist or perspiring  - Avoid sitting or lying in one position for more than 2 hours while in bed?Keep HOB at 30 degrees   - Toileting:  - Offer bedside commode  - Assess for incontinence every two hours   - Use incontinent care products after each incontinent episode such as barrier cream    Activity:  - Mobilize patient 4 times a day  - Encourage activity and walks on unit  - Encourage or provide ROM exercises   - Turn and reposition patient every 2 Hours  - Use appropriate equipment to lift or move patient in bed  - Instruct/ Assist with weight shifting every 2 when out of bed in chair  - Consider limitation of chair time 2 hour  intervals    Skin Care:  - Avoid use of baby powder, tape, friction and shearing, hot water or constrictive clothing  - Relieve pressure over bony prominences using chair cushion  - Do not massage red bony areas    Outcome: Progressing     Problem: Decreased Cardiac Output  Goal: Cardiac output adequate for individual needs  Description: INTERVENTIONS: Monitor for signs and symptoms of decreased cardiac output   - Monitor for dyspnea with exertion and at rest  - Monitor for orthopnea  - Monitor for signs of tachycardia. Place patient on telemetry monitoring.  - Assess patient for jugular vein distention  - Assess patient for lower extremity edema and poor peripheral perfusion   - Auscultate lung sound for Fine bibasilar crackles   - Monitor for cardiac arrythmias   - Administer beta blockers, antiarrhythmic, and blood pressure medications as ordered    Outcome: Progressing     Problem: Impaired Gas Exchange  Goal: Optimize oxygenation and ensure adequate ventilation  Description: INTERVENTIONS: Monitor for signs and symptoms of respiratory distress                - Elevate HOB or use high fowlers to promote lung expansion                - Administer oxygen as ordered to maintain adequate oxygenation                - Encourage use of IS to promote lung expansion and prevent PN                - Monitor ABGs to assess oxygenation status                - Monitor blood oxygen level to maintain adequate oxygenation                - Encourage cough and deep breathing exercises to promote lung expansion                - Monitor patient's mental status for increased confusion    Outcome: Progressing     Problem: Excess Fluid Volume  Goal: Patient is able to achieve and maintain homeostasis  Description: INTERVENTIONS: Monitor for sign and symptoms of fluid overload  - Evaluate LE edema every shift  - Elevate LE to prevent dependent edema  - Apply BRIAN stockings as ordered   - Monitor ankle circumference daily  - Assess for  jugular vein distention  - Evaluate provider orders for the CHF diuretic algorithm. Administer diuretics as ordered  - Weigh the patient daily at 0600 and report a weight gain of five pounds or more   - Strict intake and output  - Monitor fluid intake and adhere to fluid restrictions  - Assess lung sounds every shift and as needed  - Monitor vital signs and lab values (CBC, chem, BUN, BNP)  - Measure and document urine output    Outcome: Progressing     Problem: Activity Intolerance  Goal: Patient is able to perform activities within their limitations  Description: INTERVENTIONS:                       -   Alternate periods of activity with periods of rest                 -   Patients is able to maintain normal vitals heart rhythm during activity                 -   Gradually increase activity and exercise as patient can tolerate                 -   Monitor blood pressure and heart before and after exercise                  -   Monitor blood oxygen saturation during activity and apply oxygen as needed    Outcome: Progressing     Problem: Knowledge Deficit  Goal: Patient is able to verbalize understanding of Heart Failure after education  Description: INTERVENTIONS:  - Educate the patient and family on signs and symptoms of HF  - Provide the patient with HF education and HF zone tool  - Educate on the importance of daily weight in the AM and reporting a weight gain               of 3 or more pounds to their primary care physician  - Monitor for SOB  - Maintain and sodium and fluid restriction  - Educate the patient on the importance of medications such as: diuretics, betablockers,               antiarrhythmics and their purpose, dose, route, side effects and labs               if they are needed    Outcome: Progressing

## 2025-07-19 NOTE — ASSESSMENT & PLAN NOTE
History of cirrhosis.  Abdominal pain worse today per patient report.  Will be evaluated by primary team.

## 2025-07-19 NOTE — PLAN OF CARE
Problem: PAIN - ADULT  Goal: Verbalizes/displays adequate comfort level or baseline comfort level  Description: Interventions:  - Encourage patient to monitor pain and request assistance  - Assess pain using appropriate pain scale  - Administer analgesics as ordered based on type and severity of pain and evaluate response  - Implement non-pharmacological measures as appropriate and evaluate response  - Consider cultural and social influences on pain and pain management  - Notify physician/advanced practitioner if interventions unsuccessful or patient reports new pain  - Educate patient/family on pain management process including their role and importance of  reporting pain   - Provide non-pharmacologic/complimentary pain relief interventions  Outcome: Progressing     Problem: INFECTION - ADULT  Goal: Absence or prevention of progression during hospitalization  Description: INTERVENTIONS:  - Assess and monitor for signs and symptoms of infection  - Monitor lab/diagnostic results  - Monitor all insertion sites, i.e. indwelling lines, tubes, and drains  - Monitor endotracheal if appropriate and nasal secretions for changes in amount and color  - [place] appropriate cooling/warming therapies per order  - Administer medications as ordered  - Instruct and encourage patient and family to use good hand hygiene technique  - Identify and instruct in appropriate isolation precautions for identified infection/condition  Outcome: Progressing     Problem: SAFETY ADULT  Goal: Patient will remain free of falls  Description: INTERVENTIONS:  - Educate patient/family on patient safety including physical limitations  - Instruct patient to call for assistance with activity   - Consider consulting OT/PT to assist with strengthening/mobility based on AM PAC & JH-HLM score  - Consult OT/PT to assist with strengthening/mobility   - Keep Call bell within reach  - Keep bed low and locked with side rails adjusted as appropriate  - Keep  care items and personal belongings within reach  - Initiate and maintain comfort rounds  - Make Fall Risk Sign visible to staff  - Offer Toileting every 2 Hours, in advance of need  - Initiate/Maintain bed alarm  - Obtain necessary fall risk management equipment: bed alarm   - Apply yellow socks and bracelet for high fall risk patients  - Consider moving patient to room near nurses station  Outcome: Progressing  Goal: Maintain or return to baseline ADL function  Description: INTERVENTIONS:  -  Assess patient's ability to carry out ADLs; assess patient's baseline for ADL function and identify physical deficits which impact ability to perform ADLs (bathing, care of mouth/teeth, toileting, grooming, dressing, etc.)  - Assess/evaluate cause of self-care deficits   - Assess range of motion  - Assess patient's mobility; develop plan if impaired  - Assess patient's need for assistive devices and provide as appropriate  - Encourage maximum independence but intervene and supervise when necessary  - Involve family in performance of ADLs  - Assess for home care needs following discharge   - Consider OT consult to assist with ADL evaluation and planning for discharge  - Provide patient education as appropriate  - Monitor functional capacity and physical performance, use of AM PAC & JH-HLM   - Monitor gait, balance and fatigue with ambulation    Outcome: Progressing  Goal: Maintains/Returns to pre admission functional level  Description: INTERVENTIONS:  - Perform AM-PAC 6 Click Basic Mobility/ Daily Activity assessment daily.  - Set and communicate daily mobility goal to care team and patient/family/caregiver.   - Collaborate with rehabilitation services on mobility goals if consulted  - Out of bed for toileting  - Record patient progress and toleration of activity level   Outcome: Progressing     Problem: DISCHARGE PLANNING  Goal: Discharge to home or other facility with appropriate resources  Description: INTERVENTIONS:  -  Identify barriers to discharge w/patient and caregiver  - Arrange for needed discharge resources and transportation as appropriate  - Identify discharge learning needs (meds, wound care, etc.)  - Arrange for interpretive services to assist at discharge as needed  - Refer to Case Management Department for coordinating discharge planning if the patient needs post-hospital services based on physician/advanced practitioner order or complex needs related to functional status, cognitive ability, or social support system  Outcome: Progressing     Problem: Knowledge Deficit  Goal: Patient/family/caregiver demonstrates understanding of disease process, treatment plan, medications, and discharge instructions  Description: Complete learning assessment and assess knowledge base.  Interventions:  - Provide teaching at level of understanding  - Provide teaching via preferred learning methods  Outcome: Progressing     Problem: METABOLIC, FLUID AND ELECTROLYTES - ADULT  Goal: Electrolytes maintained within normal limits  Description: INTERVENTIONS:  - Monitor labs and assess patient for signs and symptoms of electrolyte imbalances  - Administer electrolyte replacement as ordered  - Monitor response to electrolyte replacements, including repeat lab results as appropriate  - Instruct patient on fluid and nutrition as appropriate  Outcome: Progressing  Goal: Fluid balance maintained  Description: INTERVENTIONS:  - Monitor labs   - Monitor I/O and WT  - Instruct patient on fluid and nutrition as appropriate  - Assess for signs & symptoms of volume excess or deficit  Outcome: Progressing  Goal: Glucose maintained within target range  Description: INTERVENTIONS:  - Monitor Blood Glucose as ordered  - Assess for signs and symptoms of hyperglycemia and hypoglycemia  - Administer ordered medications to maintain glucose within target range  - Assess nutritional intake and initiate nutrition service referral as needed  Outcome: Progressing      Problem: Prexisting or High Potential for Compromised Skin Integrity  Goal: Skin integrity is maintained or improved  Description: INTERVENTIONS:  - Identify patients at risk for skin breakdown  - Assess and monitor skin integrity including under and around medical devices   - Assess and monitor nutrition and hydration status  - Monitor labs  - Assess for incontinence   - Turn and reposition patient  - Assist with mobility/ambulation  - Relieve pressure over mariano prominences   - Avoid friction and shearing  - Provide appropriate hygiene as needed including keeping skin clean and dry  - Evaluate need for skin moisturizer/barrier cream  - Collaborate with interdisciplinary team  - Patient/family teaching  - Consider wound care consult    Assess:  - Review Royce scale daily  Outcome: Progressing     Problem: Decreased Cardiac Output  Goal: Cardiac output adequate for individual needs  Description: INTERVENTIONS: Monitor for signs and symptoms of decreased cardiac output   - Monitor for dyspnea with exertion and at rest  - Monitor for orthopnea  - Monitor for signs of tachycardia. Place patient on telemetry monitoring.  - Assess patient for jugular vein distention  - Assess patient for lower extremity edema and poor peripheral perfusion   - Auscultate lung sound for Fine bibasilar crackles   - Monitor for cardiac arrythmias   - Administer beta blockers, antiarrhythmic, and blood pressure medications as ordered    Outcome: Progressing     Problem: Impaired Gas Exchange  Goal: Optimize oxygenation and ensure adequate ventilation  Description: INTERVENTIONS: Monitor for signs and symptoms of respiratory distress                - Elevate HOB or use high fowlers to promote lung expansion                - Administer oxygen as ordered to maintain adequate oxygenation                - Encourage use of IS to promote lung expansion and prevent PN                - Monitor ABGs to assess oxygenation status                -  Monitor blood oxygen level to maintain adequate oxygenation                - Encourage cough and deep breathing exercises to promote lung expansion                - Monitor patient's mental status for increased confusion    Outcome: Progressing     Problem: Excess Fluid Volume  Goal: Patient is able to achieve and maintain homeostasis  Description: INTERVENTIONS: Monitor for sign and symptoms of fluid overload  - Evaluate LE edema every shift  - Elevate LE to prevent dependent edema  - Apply BRIAN stockings as ordered   - Monitor ankle circumference daily  - Assess for jugular vein distention  - Evaluate provider orders for the CHF diuretic algorithm. Administer diuretics as ordered  - Weigh the patient daily at 0600 and report a weight gain of five pounds or more   - Strict intake and output  - Monitor fluid intake and adhere to fluid restrictions  - Assess lung sounds every shift and as needed  - Monitor vital signs and lab values (CBC, chem, BUN, BNP)  - Measure and document urine output    Outcome: Progressing     Problem: Activity Intolerance  Goal: Patient is able to perform activities within their limitations  Description: INTERVENTIONS:                       -   Alternate periods of activity with periods of rest                 -   Patients is able to maintain normal vitals heart rhythm during activity                 -   Gradually increase activity and exercise as patient can tolerate                 -   Monitor blood pressure and heart before and after exercise                  -   Monitor blood oxygen saturation during activity and apply oxygen as needed    Outcome: Progressing     Problem: Knowledge Deficit  Goal: Patient is able to verbalize understanding of Heart Failure after education  Description: INTERVENTIONS:  - Educate the patient and family on signs and symptoms of HF  - Provide the patient with HF education and HF zone tool  - Educate on the importance of daily weight in the AM and reporting a  weight gain               of 3 or more pounds to their primary care physician  - Monitor for SOB  - Maintain and sodium and fluid restriction  - Educate the patient on the importance of medications such as: diuretics, betablockers,               antiarrhythmics and their purpose, dose, route, side effects and labs               if they are needed    Outcome: Progressing

## 2025-07-19 NOTE — ASSESSMENT & PLAN NOTE
Came after fall  Shows left periorbital bruise, multiple bruises on right lateral chest, right buttock area, left knee  Patient also has T12 fracture, right third finger fracture  PainManagement, case management  For vertebral fracture, discussed with neurosurgery, recommending TLSO brace, upright positions,  PT OT recommending level 2  7/19/25:Complaining of left upper abdominal and chest pain, early morning a piece of wall fell off on her chest and left shoulder area.  -X-ray of the left shoulder and left lateral ribs reviewed, no fracture noted (official read pending)

## 2025-07-19 NOTE — NURSING NOTE
Called into pt room by PCA to find pt crying and stating a piece of the wall fell and hit her in the face and chest area. Pt complaining of chronic abdominal pain as well as acute chest pain where she stated the piece of wall hit her. No additional marks were noted on the pt, some old bruising from a prior fall noted. Pt comforted and given pain medicine. Hospital supervisor, charge nurse, security, and Dr. Stuart made aware. Pt neuro assessment benign. Pt is now resting in the chair with  at bedside. Will continue to monitor pt.

## 2025-07-20 LAB
ALBUMIN SERPL BCG-MCNC: 3.5 G/DL (ref 3.5–5)
ALP SERPL-CCNC: 182 U/L (ref 34–104)
ALT SERPL W P-5'-P-CCNC: 22 U/L (ref 7–52)
AMMONIA PLAS-SCNC: 46 UMOL/L (ref 18–72)
ANION GAP SERPL CALCULATED.3IONS-SCNC: 9 MMOL/L (ref 4–13)
AST SERPL W P-5'-P-CCNC: 79 U/L (ref 13–39)
BASOPHILS # BLD AUTO: 0.03 THOUSANDS/ÂΜL (ref 0–0.1)
BASOPHILS NFR BLD AUTO: 1 % (ref 0–1)
BILIRUB SERPL-MCNC: 4.27 MG/DL (ref 0.2–1)
BUN SERPL-MCNC: 13 MG/DL (ref 5–25)
CALCIUM SERPL-MCNC: 8.6 MG/DL (ref 8.4–10.2)
CHLORIDE SERPL-SCNC: 94 MMOL/L (ref 96–108)
CO2 SERPL-SCNC: 37 MMOL/L (ref 21–32)
CREAT SERPL-MCNC: 1.11 MG/DL (ref 0.6–1.3)
EOSINOPHIL # BLD AUTO: 0.03 THOUSAND/ÂΜL (ref 0–0.61)
EOSINOPHIL NFR BLD AUTO: 1 % (ref 0–6)
ERYTHROCYTE [DISTWIDTH] IN BLOOD BY AUTOMATED COUNT: 19.2 % (ref 11.6–15.1)
GFR SERPL CREATININE-BSD FRML MDRD: 56 ML/MIN/1.73SQ M
GLUCOSE SERPL-MCNC: 89 MG/DL (ref 65–140)
HCT VFR BLD AUTO: 28.9 % (ref 34.8–46.1)
HGB BLD-MCNC: 8.8 G/DL (ref 11.5–15.4)
IMM GRANULOCYTES # BLD AUTO: 0.02 THOUSAND/UL (ref 0–0.2)
IMM GRANULOCYTES NFR BLD AUTO: 1 % (ref 0–2)
LYMPHOCYTES # BLD AUTO: 0.77 THOUSANDS/ÂΜL (ref 0.6–4.47)
LYMPHOCYTES NFR BLD AUTO: 19 % (ref 14–44)
MAGNESIUM SERPL-MCNC: 1.7 MG/DL (ref 1.9–2.7)
MCH RBC QN AUTO: 29.8 PG (ref 26.8–34.3)
MCHC RBC AUTO-ENTMCNC: 30.4 G/DL (ref 31.4–37.4)
MCV RBC AUTO: 98 FL (ref 82–98)
MONOCYTES # BLD AUTO: 0.35 THOUSAND/ÂΜL (ref 0.17–1.22)
MONOCYTES NFR BLD AUTO: 9 % (ref 4–12)
NEUTROPHILS # BLD AUTO: 2.88 THOUSANDS/ÂΜL (ref 1.85–7.62)
NEUTS SEG NFR BLD AUTO: 69 % (ref 43–75)
NRBC BLD AUTO-RTO: 0 /100 WBCS
PLATELET # BLD AUTO: 78 THOUSANDS/UL (ref 149–390)
PMV BLD AUTO: 10.4 FL (ref 8.9–12.7)
POTASSIUM SERPL-SCNC: 3.5 MMOL/L (ref 3.5–5.3)
PROT SERPL-MCNC: 6.7 G/DL (ref 6.4–8.4)
RBC # BLD AUTO: 2.95 MILLION/UL (ref 3.81–5.12)
SODIUM SERPL-SCNC: 140 MMOL/L (ref 135–147)
WBC # BLD AUTO: 4.08 THOUSAND/UL (ref 4.31–10.16)

## 2025-07-20 PROCEDURE — 85025 COMPLETE CBC W/AUTO DIFF WBC: CPT | Performed by: FAMILY MEDICINE

## 2025-07-20 PROCEDURE — 99232 SBSQ HOSP IP/OBS MODERATE 35: CPT | Performed by: FAMILY MEDICINE

## 2025-07-20 PROCEDURE — 80053 COMPREHEN METABOLIC PANEL: CPT | Performed by: NURSE PRACTITIONER

## 2025-07-20 PROCEDURE — 82140 ASSAY OF AMMONIA: CPT | Performed by: NURSE PRACTITIONER

## 2025-07-20 PROCEDURE — 99232 SBSQ HOSP IP/OBS MODERATE 35: CPT | Performed by: NURSE PRACTITIONER

## 2025-07-20 PROCEDURE — 83735 ASSAY OF MAGNESIUM: CPT | Performed by: NURSE PRACTITIONER

## 2025-07-20 RX ADMIN — LACTULOSE 30 G: 10 SOLUTION ORAL at 08:25

## 2025-07-20 RX ADMIN — DAPSONE 50 MG: 25 TABLET ORAL at 08:26

## 2025-07-20 RX ADMIN — OXYCODONE HYDROCHLORIDE 10 MG: 10 TABLET ORAL at 05:35

## 2025-07-20 RX ADMIN — PANTOPRAZOLE SODIUM 40 MG: 40 TABLET, DELAYED RELEASE ORAL at 05:35

## 2025-07-20 RX ADMIN — AMITRIPTYLINE HYDROCHLORIDE 50 MG: 25 TABLET, FILM COATED ORAL at 21:14

## 2025-07-20 RX ADMIN — LACTULOSE 30 G: 10 SOLUTION ORAL at 11:44

## 2025-07-20 RX ADMIN — LACTULOSE 30 G: 10 SOLUTION ORAL at 17:01

## 2025-07-20 RX ADMIN — RIFAXIMIN 550 MG: 550 TABLET ORAL at 21:14

## 2025-07-20 RX ADMIN — HEPARIN SODIUM 5000 UNITS: 5000 INJECTION INTRAVENOUS; SUBCUTANEOUS at 16:56

## 2025-07-20 RX ADMIN — LACTULOSE 30 G: 10 SOLUTION ORAL at 21:14

## 2025-07-20 RX ADMIN — URSODIOL 300 MG: 300 CAPSULE ORAL at 17:01

## 2025-07-20 RX ADMIN — FOLIC ACID 1 MG: 1 TABLET ORAL at 08:25

## 2025-07-20 RX ADMIN — RIFAXIMIN 550 MG: 550 TABLET ORAL at 08:25

## 2025-07-20 RX ADMIN — HEPARIN SODIUM 5000 UNITS: 5000 INJECTION INTRAVENOUS; SUBCUTANEOUS at 05:35

## 2025-07-20 RX ADMIN — HEPARIN SODIUM 5000 UNITS: 5000 INJECTION INTRAVENOUS; SUBCUTANEOUS at 21:14

## 2025-07-20 RX ADMIN — URSODIOL 300 MG: 300 CAPSULE ORAL at 08:25

## 2025-07-20 RX ADMIN — POLYETHYLENE GLYCOL 3350 17 G: 17 POWDER, FOR SOLUTION ORAL at 08:25

## 2025-07-20 NOTE — CASE MANAGEMENT
Case Management Discharge Planning Note    Patient name Bailey Olsen  Location /-01 MRN [id number]  : 1970 Date 2025       Current Admission Date: 2025  Current Admission Diagnosis:Hypokalemia   Patient Active Problem List    Diagnosis Date Noted    Oliguria 2025    Acute renal failure with tubular necrosis (HCC) 2025    ATN (acute tubular necrosis) (HCC) 2025    Closed fracture of phalanx of right middle finger 2025    Closed fracture of twelfth thoracic vertebra (HCC) 2025    Stage 3a chronic kidney disease (HCC) 2025    Scalp hematoma 2025    Contusion of back 2025    Ecchymosis 2025    Falls 2025    Chest pain 2025    Abdominal pain 2025    Acute kidney injury (HCC) 2025    Gram-negative bacteremia 2025    Acute respiratory failure (HCC) 2025    Carcinoid tumor of pancreas 2025    Hypomagnesemia 2025    Upper GI bleed 2025    Acute anemia 2025    Rectal bleeding 2025    Acute blood loss anemia 2025    Chronic, continuous use of opioids 2025    Acute metabolic encephalopathy 2025    SIRS (systemic inflammatory response syndrome) ([facility]) 2025    Sacral fracture, closed ([facility]) 11/15/2024    KISHORE (acute kidney injury) ([facility]) 10/04/2024    Morbid obesity with BMI of 40.0-44.9, adult ([facility]) 10/04/2024    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) 2024    Fall from standing 2024    Closed compression fracture of L1 lumbar vertebra, sequela 2024    Closed stable burst fracture of first lumbar vertebra with routine healing 2024    Groin hematoma 2024    Ambulatory dysfunction 2024    Intractable back pain 2024    Fall 2024    Liver cirrhosis secondary to HYATT (nonalcoholic steatohepatitis) (HCC) 2024    History of malignant carcinoid tumor of small intestine 2024     "Class 3 severe obesity due to excess calories in adult 05/26/2024    Primary hypertension 05/26/2024    Hypokalemia 05/26/2024    Fibromyalgia 05/26/2024    Iron deficiency anemia 05/26/2024    Pancytopenia (HCC) 05/26/2024    Sarcoidosis 05/26/2024    Bilateral pulmonary infiltrates 05/26/2024      LOS (days): 4  Geometric Mean LOS (GMLOS) (days): 4.6  Days to GMLOS:0.8     OBJECTIVE:  Risk of Unplanned Readmission Score: 65         Current admission status: Inpatient   Preferred Pharmacy:   [id number] - [place], [place] - [address]  [address] [facility] [address]  Phone: [phone] Fax: [phone]    [facility]pharmacy #[id number] - [place], [place] - [address]  [address] [address]  Phone: [phone] Fax: [phone]    Primary Care Provider: Nayely Brown DO    Primary Insurance: BLUE CROSS  Secondary Insurance:     DISCHARGE DETAILS:        CM met with patient to discuss discharge planning, reviewed with patient as of right now only accepting facility for STR is [facility]. Pt stated \" I was told by my  and sister not to talk to anyone until they get here because a piece of wall fell on her yesterday morning\". CM explained to pt attending physician is aware a piece of wall fell on her yesterday, anticipating discharge in 24 hours, and therapy recommendations for discharge is STR. Pt verbalized understanding, but does not want to discuss any discharge planning until family gets here.                 1056- team meeting held with pt and spouse at [facility]. Attending meeting was ANTONINO Keyes MD, MICHELLE Brush, RN and myself to review discharge plan, recommendations for STR. Patient choice list for STR provided to pt and spouse at bedside; they want to discuss choices and will make a decision after they discuss with other family members. CM contact information provided to pt/spouse.              2430- spoke with patient " "and spouse at bedside, pt choice for STR is [facility], reserved in aidin. Prior insurance auth initiated through  discharge support for STR, pending approval.               1225- message received from  discharge support \" Jesenia from [facility] called and stated that she will be canceling the SNF auth request that was submitted [id number] . She states that [employer] is pt's secondary insurance and that she has straight Medicare as her primary. \"   [facility] message sent to make them aware, Medicare is primary insurance. Waiting to hear back from [facility] if they can accept patient.   Spoke with patient made aware that Medicare is Primary and we are waiting for [facility] to verify medicare benefits. Pt verbalized understanding.                                                                          "

## 2025-07-20 NOTE — ASSESSMENT & PLAN NOTE
Baseline creatinine 0.9-1.1 mg/dL     Oliguric ATN KISHORE (contrast, Toradol, hypotension, lisinopril, spironolactone) versus HRS.  Status post Lasix stress test with robust urine output 5.4 L in 24 hours.  Lasix infusion discontinued yesterday.  Home torsemide, lisinopril, spironolactone remains on hold.    Recommendations 7/20: Creatinine back to baseline.  Patient endorses nonoliguric however no I/O documented.  Message sent to primary RN.  Continue to hold lisinopril, spironolactone.  Please avoid further NSAIDs.  Regarding torsemide, will hold for now given poor oral intake.  Follow-up with nursing regarding urine output

## 2025-07-20 NOTE — PROGRESS NOTES
Patient:  MEHNAZ LEROY    MRN:  [id number]    Aidin Request ID:  [id number]    Level of care reserved:  Skilled Nursing Facility    Partner Reserved:  [facility]  , [place] [address] [phone]    Clinical needs requested:    Geography searched:  10 miles around [address]    Start of Service:    Request sent:  12:29pm EDT on 7/19/2025 by Gabriella Kinney    Partner reserved:  11:41am EDT on 7/20/2025 by Gabriella Kinney    Choice list shared:  10:09am EDT on 7/20/2025 by Gabriella Kinney

## 2025-07-20 NOTE — ASSESSMENT & PLAN NOTE
Came after fall  Shows left periorbital bruise, multiple bruises on right lateral chest, right buttock area, left knee  Patient also has T12 fracture, right third finger fracture  PainManagement, case management  For vertebral fracture, discussed with neurosurgery, recommending TLSO brace, upright positions,  PT OT recommending level 2  7/19/25:Complaining of left upper abdominal and chest pain, early morning a piece of wall fell off on her chest and left shoulder area.  -X-ray of the left shoulder and left lateral ribs reviewed, no fracture noted

## 2025-07-20 NOTE — DISCHARGE SUPPORT
Case Management Assessment & Discharge Planning Note    Patient name Bailey Olsen  Location /-01 MRN [id number]  : 1970 Date 2025       Current Admission Date: 2025  Current Admission Diagnosis:Hypokalemia   Patient Active Problem List    Diagnosis Date Noted    Oliguria 2025    Acute renal failure with tubular necrosis (HCC) 2025    ATN (acute tubular necrosis) (HCC) 2025    Closed fracture of phalanx of right middle finger 2025    Closed fracture of twelfth thoracic vertebra (HCC) 2025    Stage 3a chronic kidney disease (HCC) 2025    Scalp hematoma 2025    Contusion of back 2025    Ecchymosis 2025    Falls 2025    Chest pain 2025    Abdominal pain 2025    Acute kidney injury (HCC) 2025    Gram-negative bacteremia 2025    Acute respiratory failure (HCC) 2025    Carcinoid tumor of pancreas 2025    Hypomagnesemia 2025    Upper GI bleed 2025    Acute anemia 2025    Rectal bleeding 2025    Acute blood loss anemia 2025    Chronic, continuous use of opioids 2025    Acute metabolic encephalopathy 2025    SIRS (systemic inflammatory response syndrome) ([facility]) 2025    Sacral fracture, closed ([facility]) 11/15/2024    KISHORE (acute kidney injury) ([facility]) 10/04/2024    Morbid obesity with BMI of 40.0-44.9, adult ([facility]) 10/04/2024    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) 2024    Fall from standing 2024    Closed compression fracture of L1 lumbar vertebra, sequela 2024    Closed stable burst fracture of first lumbar vertebra with routine healing 2024    Groin hematoma 2024    Ambulatory dysfunction 2024    Intractable back pain 2024    Fall 2024    Liver cirrhosis secondary to HYATT (nonalcoholic steatohepatitis) (HCC) 2024    History of malignant carcinoid tumor of small  intestine 05/26/2024    Class 3 severe obesity due to excess calories in adult 05/26/2024    Primary hypertension 05/26/2024    Hypokalemia 05/26/2024    Fibromyalgia 05/26/2024    Iron deficiency anemia 05/26/2024    Pancytopenia (HCC) 05/26/2024    Sarcoidosis 05/26/2024    Bilateral pulmonary infiltrates 05/26/2024      LOS (days): 4  Geometric Mean LOS (GMLOS) (days): 4.6  Days to GMLOS:0.6   Other  Auth: Cancelled  Auth Type: SNF  Reason Auth Cancelled: Jesenia christel [facility] called and stated that she will be canceling the SNF auth request that was submitted [id number] . She states that [facility] is pt's secondary insurance and that she has straight Medicare as her primary.   Notified: Gabriella ECHOLS     Updates to authorization status will be noted in chart.    Please reach out to CM for updates on any clinical information.

## 2025-07-20 NOTE — DISCHARGE SUPPORT
Jesenia from [facility] called and stated that she will be canceling the SNF auth request that was submitted [id number] . She states that [facility] is pt's secondary insurance and that she has straight Medicare as her primary.  notified: Gabriella ECHOLS

## 2025-07-20 NOTE — PROGRESS NOTES
Progress Note - Hospitalist   Name: Bailey Olsen 54 y.o. female I MRN: [id number]  Unit/Bed#: -01 I Date of Admission: 7/16/2025   Date of Service: 7/20/2025 I Hospital Day: 4    Assessment & Plan  Acute renal failure with tubular necrosis (HCC)  Baseline creatinine around 0.9  Creatinine is 1.7  Patient did not make enough urine  Suspect contrast induced ATN-secondary to contrast exposure, hypertension, medication side effect  Initially patient started Lasix drip with significant improvement, renal function is improving.  Lasix drip discontinued  Hypokalemia  Chronic hypokalemia, continue to supplement  Fall  Came after fall  Shows left periorbital bruise, multiple bruises on right lateral chest, right buttock area, left knee  Patient also has T12 fracture, right third finger fracture  PainManagement, case management  For vertebral fracture, discussed with neurosurgery, recommending TLSO brace, upright positions,  PT OT recommending level 2  7/19/25:Complaining of left upper abdominal and chest pain, early morning a piece of wall fell off on her chest and left shoulder area.  -X-ray of the left shoulder and left lateral ribs reviewed, no fracture noted   Closed fracture of phalanx of right middle finger  Secondary to fall, imaging reviewed  Continue pain management, embolization, can use finger splint  Follow orthopedic recommendation  Continue diane tape per orthopedic recommendation  Closed fracture of twelfth thoracic vertebra (HCC)  There is a new mild superior endplate compression fracture at T12 compared to May 2025. Status post kyphoplasty at L1. There is a healing fracture of the right anterior fourth rib.   Appreciate neurosurgery recommendation, upright position, TLSO brace.    Has recent history of fall  Complaining of lower back pain  Continue pain management  No neurodeficit  Due to recommending level 2  Liver cirrhosis secondary to HYATT (nonalcoholic steatohepatitis) (HCC)  Continue  conservative management with home medications.  Pancytopenia (HCC)  Secondary to liver cirrhosis, continue to monitor, no active bleeding noted  Morbid obesity with BMI of 40.0-44.9, adult (HCC)  Lifestyle modification.  Chronic, continuous use of opioids  Will use narcotics, continue to monitor.  During the run, discussed with patient and her  at the bedside in presence of case management and nurse-regarding pain management.  Patient has liver cirrhosis which can affect adequate pain management only using with narcotics due to concern of encephalopathy.  Verbalized understanding agrees.  Oliguria      VTE Pharmacologic Prophylaxis: VTE Score: 5 High Risk (Score >/= 5) - Pharmacological DVT Prophylaxis Ordered: heparin. Sequential Compression Devices Ordered.    Mobility:   Basic Mobility Inpatient Raw Score: 16  JH-HLM Goal: 5: Stand one or more mins  JH-HLM Achieved: 2: Bed activities/Dependent transfer  JH-HLM Goal NOT achieved. Continue with multidisciplinary rounding and encourage appropriate mobility to improve upon JH-HLM goals.    Patient Centered Rounds: I performed bedside rounds with nursing staff today.   Discussions with Specialists or Other Care Team Provider: Nephrology    Education and Discussions with Family / Patient: Updated  () at bedside.    Current Length of Stay: 4 day(s)  Current Patient Status: Inpatient   Certification Statement: The patient will continue to require additional inpatient hospital stay due to fall at home with hypokalemia, then developed ATN due to contrast exposure which improving, recommending rehab-pending placement  Discharge Plan: Anticipate discharge in 24-48 hrs to rehab facility.    Code Status: Level 1 - Full Code    Subjective   Seen and evaluated during the rounding.  Resting comfortably.  Denies any significant complaint other than pain in different parts of the body which patient complaining since admission.  Denies any  headache.    Objective :  Temp:  [97 °F (36.1 °C)-97.5 °F (36.4 °C)] 97 °F (36.1 °C)  HR:  [101-105] 101  BP: ()/(49-77) 136/76  Resp:  [17-18] 18  SpO2:  [83 %-96 %] 92 %  O2 Device: None (Room air)  Nasal Cannula O2 Flow Rate (L/min):  [2 L/min] 2 L/min    Body mass index is 39.68 kg/m².     Input and Output Summary (last 24 hours):     Intake/Output Summary (Last 24 hours) at 7/20/2025 0909  Last data filed at 7/20/2025 0700  Gross per 24 hour   Intake 360 ml   Output 1110 ml   Net -750 ml       Physical Exam  Vitals and nursing note reviewed. Exam conducted with a chaperone present.   Constitutional:       Appearance: She is obese. She is not ill-appearing or diaphoretic.   HENT:      Mouth/Throat:      Mouth: Mucous membranes are moist.      Pharynx: No oropharyngeal exudate.     Eyes:      Extraocular Movements: Extraocular movements intact.      Conjunctiva/sclera: Conjunctivae normal.      Pupils: Pupils are equal, round, and reactive to light.       Cardiovascular:      Rate and Rhythm: Normal rate.      Heart sounds:      No friction rub. No gallop.   Pulmonary:      Effort: No respiratory distress.      Breath sounds: No stridor. No wheezing or rhonchi.   Abdominal:      General: There is no distension.      Palpations: There is no mass.      Tenderness: There is no abdominal tenderness.      Hernia: No hernia is present.     Musculoskeletal:         General: Tenderness (multiple sites of the body (which is present since admission)) present.      Right lower leg: No edema.      Left lower leg: No edema.      Comments: Patient has new bruises on left anterior shoulder, as well as left chest-on base of the breast (patient was examined in presence of female chaperone-also patient  at the bedside)  Patient range of motion of left shoulder restricted due to pain.     Skin:     Findings: Bruising (has bruise on multiple sites of the-left periorbital area, left back of the shoulder, right lateral  chest, left knee-which all are present since admission, and on clinical exam remained stable.) present.     Neurological:      General: No focal deficit present.      Mental Status: She is alert and oriented to person, place, and time.      Cranial Nerves: No cranial nerve deficit.      Sensory: No sensory deficit.         Lines/Drains:              Lab Results: I have reviewed the following results:   Results from last 7 days   Lab Units 07/20/25  0558   WBC Thousand/uL 4.08*   HEMOGLOBIN g/dL 8.8*   HEMATOCRIT % 28.9*   PLATELETS Thousands/uL 78*   SEGS PCT % 69   LYMPHO PCT % 19   MONO PCT % 9   EOS PCT % 1     Results from last 7 days   Lab Units 07/20/25  0558   SODIUM mmol/L 140   POTASSIUM mmol/L 3.5   CHLORIDE mmol/L 94*   CO2 mmol/L 37*   BUN mg/dL 13   CREATININE mg/dL 1.11   ANION GAP mmol/L 9   CALCIUM mg/dL 8.6   ALBUMIN g/dL 3.5   TOTAL BILIRUBIN mg/dL 4.27*   ALK PHOS U/L 182*   ALT U/L 22   AST U/L 79*   GLUCOSE RANDOM mg/dL 89     Results from last 7 days   Lab Units 07/16/25  1009   INR  1.95*                   Recent Cultures (last 7 days):         Imaging Results Review: No pertinent imaging studies reviewed.  Other Study Results Review: No additional pertinent studies reviewed.    Last 24 Hours Medication List:     Current Facility-Administered Medications:     amitriptyline (ELAVIL) tablet 50 mg, HS    cyclobenzaprine (FLEXERIL) tablet 10 mg, TID PRN    dapsone tablet 50 mg, Daily    folic acid (FOLVITE) tablet 1 mg, Daily    heparin (porcine) subcutaneous injection 5,000 Units, Q8H MIGUEL    lactulose (CHRONULAC) oral solution 30 g, 4x Daily    lidocaine (LIDODERM) 5 % patch 1 patch, Daily    [Held by provider] metoprolol tartrate (LOPRESSOR) tablet 25 mg, Q12H MIGULE    oxyCODONE (ROXICODONE) immediate release tablet 10 mg, Q6H PRN    pantoprazole (PROTONIX) EC tablet 40 mg, Early Morning    polyethylene glycol (MIRALAX) packet 17 g, Daily    rifaximin (XIFAXAN) tablet 550 mg, Q12H MIGUEL     trimethobenzamide (TIGAN) IM injection 200 mg, Q6H PRN    ursodiol (ACTIGALL) capsule 300 mg, BID    Administrative Statements   Today, Patient Was Seen By: Ruslan Keyes MD      **Please Note: This note may have been constructed using a voice recognition system.**

## 2025-07-20 NOTE — ASSESSMENT & PLAN NOTE
Robust urinary response following Lasix infusion however now with unclear output trends-will confirm with nursing staff regarding urinary output.

## 2025-07-20 NOTE — PROGRESS NOTES
[facility]  Progress Note   Bailey Olsen 54 y.o. 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  Nephrology following for oliguric KISHORE    Assessment & Plan  Acute renal failure with tubular necrosis (HCC)  Baseline creatinine 0.9-1.1 mg/dL     Oliguric ATN KISHORE (contrast, Toradol, hypotension, lisinopril, spironolactone) versus HRS.  Status post Lasix stress test with robust urine output 5.4 L in 24 hours.  Lasix infusion discontinued yesterday.  Home torsemide, lisinopril, spironolactone remains on hold.    Recommendations 7/20: Creatinine back to baseline.  Patient endorses nonoliguric however no I/O documented.  Message sent to primary RN.  Continue to hold lisinopril, spironolactone.  Please avoid further NSAIDs.  Regarding torsemide, will hold for now given poor oral intake.  Follow-up with nursing regarding urine output  ATN (acute tubular necrosis) (HCC)    KISHORE due to ATN as above    Liver cirrhosis secondary to HYATT (nonalcoholic steatohepatitis) (HCC)    Abdominal complaints improved today.  Spironolactone and torsemide remain on hold.  Oliguria  Robust urinary response following Lasix infusion however now with unclear output trends-will confirm with nursing staff regarding urinary output.        SUBJECTIVE / 24H INTERVAL HISTORY:  Tearful on exam.  States part of the wall fell and hit her on the left shoulder yesterday.  Overwhelmed with clinical situation.  Asking for ice chips.  Some nausea.  Back pain.    Historical Information   Past Medical History[1]  Past Surgical History[2]  Social History   Social History     Substance and Sexual Activity   Alcohol Use Never      Social History     Substance and Sexual Activity   Drug Use Never     Tobacco Use History[3]    Family History:   Family History[4]    Medications:  Pertinent medications were reviewed  Current Facility-Administered Medications   Medication Dose Route Frequency Provider Last Rate    amitriptyline  50 mg Oral HS Ruslan Keyes MD      cyclobenzaprine  10 mg Oral TID PRN Ruslan Keyes MD      dapsone  50 mg Oral Daily Ruslan Keyes MD      folic acid  1 mg Oral Daily Ruslan Keyes MD      heparin (porcine)  5,000 Units Subcutaneous Q8H [facility] Ruslan Keyes MD      lactulose  30 g Oral 4x Daily Ruslan Keyes MD      lidocaine  1 patch Topical Daily Ruslan Keyes MD      [Held by provider] metoprolol tartrate  25 mg Oral Q12H [facility] Ruslan Keyes MD      oxyCODONE  10 mg Oral Q6H PRN Ruslan Keyes MD      pantoprazole  40 mg Oral Early Morning Ruslan Keyes MD      polyethylene glycol  17 g Oral Daily Ruslan Keyes MD      rifaximin  550 mg Oral Q12H [facility] Ruslan Keyes MD      trimethobenzamide  200 mg Intramuscular Q6H PRN Ruslan Keyes MD      ursodiol  300 mg Oral BID Ruslan Keyes MD           Allergies[5]      Vitals:   /76   Pulse 101   Temp (!) 97 °F (36.1 °C)   Resp 18   Wt 112 kg (245 lb 13 oz)   SpO2 92%   BMI 39.68 kg/m²   Body mass index is 39.68 kg/m².  SpO2: 92 %,   SpO2 Activity: At Rest,   O2 Device: Nasal cannula      Intake/Output Summary (Last 24 hours) at 7/20/2025 1253  Last data filed at 7/20/2025 0900  Gross per 24 hour   Intake 435 ml   Output 1110 ml   Net -675 ml     Invasive Devices       Peripheral Intravenous Line  Duration             Peripheral IV 07/16/25 Left Antecubital 4 days                    Physical Exam  General: conscious, cooperative, in no acute distress  Eyes: conjunctivae pink, anicteric sclerae  ENT: lips and mucous membranes moist  Neck: supple, no JVD, no masses  Chest: Diminished breath sounds bilaterally, no  "crackles, ronchus or wheezings  CVS: S1 & S2, normal rate, regular rhythm  Abdomen: soft, non-tender, non-distended, normoactive bowel sounds obese  Extremities: Mild edema of both legs  Skin: no rash scattered ecchymosis  Neuro: awake, alert, oriented    Diagnostic Data:  Lab: I have personally reviewed pertinent lab results.  CBC:  Results from last 7 days   Lab Units 07/20/25  0558   WBC Thousand/uL 4.08*   HEMOGLOBIN g/dL 8.8*   HEMATOCRIT % 28.9*   PLATELETS Thousands/uL 78*      CMP:   Lab Results   Component Value Date    SODIUM 140 07/20/2025    K 3.5 07/20/2025    CL 94 (L) 07/20/2025    CO2 37 (H) 07/20/2025    BUN 13 07/20/2025    CREATININE 1.11 07/20/2025    CALCIUM 8.6 07/20/2025    AST 79 (H) 07/20/2025    ALT 22 07/20/2025    ALKPHOS 182 (H) 07/20/2025    EGFR 56 07/20/2025   ,   PT/INR: No results found for: \"PT\", \"INR\",   Magnesium: No components found for: \"MAG\",  Phosphorous: No results found for: \"PHOS\"    Microbiology:  @[facility],(urinecx:7)@        MAGDA Gann    Portions of the record may have been created with voice recognition software. Occasional wrong word or \"sound a like\" substitutions may have occurred due to the inherent limitations of voice recognition software. Read the chart carefully and recognize, using context, where substitutions have occurred.       [1]   Past Medical History:  Diagnosis Date    Back pain     Carcinoid tumor     neuroendocrine    CHF (congestive heart failure) (HCC)     Cirrhosis of liver (HCC)     Hypertension     Sarcoidosis    [2]   Past Surgical History:  Procedure Laterality Date    CHOLECYSTECTOMY      GASTRIC BYPASS      HERNIA REPAIR      IR KYPHOPLASTY/VERTEBROPLASTY  10/15/2024   [3]   Social History  Tobacco Use   Smoking Status Never   Smokeless Tobacco Never   [4]   Family History  Problem Relation Name Age of Onset    Hypertension Father      Heart attack Father      No Known Problems Sister      No Known Problems Brother      No Known " Problems Brother      No Known Problems Brother      No Known Problems Brother      No Known Problems Brother      No Known Problems Daughter     [5]   Allergies  Allergen Reactions    Zolpidem GI Intolerance, Other (See Comments) and Hives     Other Reaction(s): Other      Blacks out    Blacks out    Tapentadol Sneezing and Other (See Comments)     Zoned out    Alprazolam Other (See Comments)     Other Reaction(s): Other      Feels zoned out    Feels zoned out    Amoxicillin-Pot Clavulanate Hives and Nausea Only     Amoxil fine    Dust Mite Extract Sneezing    Morphine Headache, Nausea Only and Vomiting    Tramadol GI Intolerance    Azelastine Rash    Azelastine Hcl Rash    Molds & Smuts Rash     Other Reaction(s): Unknown

## 2025-07-20 NOTE — PLAN OF CARE
Problem: PAIN - ADULT  Goal: Verbalizes/displays adequate comfort level or baseline comfort level  Description: Interventions:  - Encourage patient to monitor pain and request assistance  - Assess pain using appropriate pain scale  - Administer analgesics as ordered based on type and severity of pain and evaluate response  - Implement non-pharmacological measures as appropriate and evaluate response  - Consider cultural and social influences on pain and pain management  - Notify physician/advanced practitioner if interventions unsuccessful or patient reports new pain  - Educate patient/family on pain management process including their role and importance of  reporting pain   - Provide non-pharmacologic/complimentary pain relief interventions  Outcome: Progressing     Problem: INFECTION - ADULT  Goal: Absence or prevention of progression during hospitalization  Description: INTERVENTIONS:  - Assess and monitor for signs and symptoms of infection  - Monitor lab/diagnostic results  - Monitor all insertion sites, i.e. indwelling lines, tubes, and drains  - Monitor endotracheal if appropriate and nasal secretions for changes in amount and color  - [place] appropriate cooling/warming therapies per order  - Administer medications as ordered  - Instruct and encourage patient and family to use good hand hygiene technique  - Identify and instruct in appropriate isolation precautions for identified infection/condition  Outcome: Progressing     Problem: SAFETY ADULT  Goal: Patient will remain free of falls  Description: INTERVENTIONS:  - Educate patient/family on patient safety including physical limitations  - Instruct patient to call for assistance with activity   - Consider consulting OT/PT to assist with strengthening/mobility based on AM PAC & JH-HLM score  - Consult OT/PT to assist with strengthening/mobility   - Keep Call bell within reach  - Keep bed low and locked with side rails adjusted as appropriate  - Keep  care items and personal belongings within reach  - Initiate and maintain comfort rounds  - Make Fall Risk Sign visible to staff  - Offer Toileting every 2 Hours, in advance of need  - Initiate/Maintain bed alarm  - Obtain necessary fall risk management equipment:   - Apply yellow socks and bracelet for high fall risk patients  - Consider moving patient to room near nurses station  Outcome: Progressing  Goal: Maintain or return to baseline ADL function  Description: INTERVENTIONS:  -  Assess patient's ability to carry out ADLs; assess patient's baseline for ADL function and identify physical deficits which impact ability to perform ADLs (bathing, care of mouth/teeth, toileting, grooming, dressing, etc.)  - Assess/evaluate cause of self-care deficits   - Assess range of motion  - Assess patient's mobility; develop plan if impaired  - Assess patient's need for assistive devices and provide as appropriate  - Encourage maximum independence but intervene and supervise when necessary  - Involve family in performance of ADLs  - Assess for home care needs following discharge   - Consider OT consult to assist with ADL evaluation and planning for discharge  - Provide patient education as appropriate  - Monitor functional capacity and physical performance, use of AM PAC & JH-HLM   - Monitor gait, balance and fatigue with ambulation    Outcome: Progressing  Goal: Maintains/Returns to pre admission functional level  Description: INTERVENTIONS:  - Perform AM-PAC 6 Click Basic Mobility/ Daily Activity assessment daily.  - Set and communicate daily mobility goal to care team and patient/family/caregiver.   - Collaborate with rehabilitation services on mobility goals if consulted  - Perform Range of Motion 4 times a day.  - Reposition patient every 2 hours.  - Dangle patient 2 times a day  - Stand patient 2 times a day  - Ambulate patient 2 times a day  - Out of bed to chair 2 times a day   - Out of bed for meals 2 times a day  - Out  of bed for toileting  - Record patient progress and toleration of activity level   Outcome: Progressing     Problem: DISCHARGE PLANNING  Goal: Discharge to home or other facility with appropriate resources  Description: INTERVENTIONS:  - Identify barriers to discharge w/patient and caregiver  - Arrange for needed discharge resources and transportation as appropriate  - Identify discharge learning needs (meds, wound care, etc.)  - Arrange for interpretive services to assist at discharge as needed  - Refer to Case Management Department for coordinating discharge planning if the patient needs post-hospital services based on physician/advanced practitioner order or complex needs related to functional status, cognitive ability, or social support system  Outcome: Progressing     Problem: Knowledge Deficit  Goal: Patient/family/caregiver demonstrates understanding of disease process, treatment plan, medications, and discharge instructions  Description: Complete learning assessment and assess knowledge base.  Interventions:  - Provide teaching at level of understanding  - Provide teaching via preferred learning methods  Outcome: Progressing     Problem: METABOLIC, FLUID AND ELECTROLYTES - ADULT  Goal: Electrolytes maintained within normal limits  Description: INTERVENTIONS:  - Monitor labs and assess patient for signs and symptoms of electrolyte imbalances  - Administer electrolyte replacement as ordered  - Monitor response to electrolyte replacements, including repeat lab results as appropriate  - Instruct patient on fluid and nutrition as appropriate  Outcome: Progressing  Goal: Fluid balance maintained  Description: INTERVENTIONS:  - Monitor labs   - Monitor I/O and WT  - Instruct patient on fluid and nutrition as appropriate  - Assess for signs & symptoms of volume excess or deficit  Outcome: Progressing  Goal: Glucose maintained within target range  Description: INTERVENTIONS:  - Monitor Blood Glucose as ordered  -  Assess for signs and symptoms of hyperglycemia and hypoglycemia  - Administer ordered medications to maintain glucose within target range  - Assess nutritional intake and initiate nutrition service referral as needed  Outcome: Progressing     Problem: Prexisting or High Potential for Compromised Skin Integrity  Goal: Skin integrity is maintained or improved  Description: INTERVENTIONS:  - Identify patients at risk for skin breakdown  - Assess and monitor skin integrity including under and around medical devices   - Assess and monitor nutrition and hydration status  - Monitor labs  - Assess for incontinence   - Turn and reposition patient  - Assist with mobility/ambulation  - Relieve pressure over mariano prominences   - Avoid friction and shearing  - Provide appropriate hygiene as needed including keeping skin clean and dry  - Evaluate need for skin moisturizer/barrier cream  - Collaborate with interdisciplinary team  - Patient/family teaching  - Consider wound care consult    Assess:  - Review Royce scale daily  - Clean and moisturize skin every shift   - Inspect skin when repositioning, toileting, and assisting with ADLS  - Assess extremities for adequate circulation and sensation     Bed Management:  - Have minimal linens on bed & keep smooth, unwrinkled  - Change linens as needed when moist or perspiring  - Avoid sitting or lying in one position for more than 2 hours while in bed?Keep HOB at 30 degrees   - Toileting:  - Offer bedside commode  - Assess for incontinence every 2 hours   - Use incontinent care products after each incontinent episode such as barrier cream    Activity:  - Mobilize patient 4 times a day  - Encourage activity and walks on unit  - Encourage or provide ROM exercises   - Turn and reposition patient every 2 Hours  - Use appropriate equipment to lift or move patient in bed  - Instruct/ Assist with weight shifting every 2 when out of bed in chair  - Consider limitation of chair time 2 hour  intervals    Skin Care:  - Avoid use of baby powder, tape, friction and shearing, hot water or constrictive clothing  - Relieve pressure over bony prominences using chair cushion   - Do not massage red bony areas    Outcome: Progressing     Problem: Decreased Cardiac Output  Goal: Cardiac output adequate for individual needs  Description: INTERVENTIONS: Monitor for signs and symptoms of decreased cardiac output   - Monitor for dyspnea with exertion and at rest  - Monitor for orthopnea  - Monitor for signs of tachycardia. Place patient on telemetry monitoring.  - Assess patient for jugular vein distention  - Assess patient for lower extremity edema and poor peripheral perfusion   - Auscultate lung sound for Fine bibasilar crackles   - Monitor for cardiac arrythmias   - Administer beta blockers, antiarrhythmic, and blood pressure medications as ordered    Outcome: Progressing     Problem: Impaired Gas Exchange  Goal: Optimize oxygenation and ensure adequate ventilation  Description: INTERVENTIONS: Monitor for signs and symptoms of respiratory distress                - Elevate HOB or use high fowlers to promote lung expansion                - Administer oxygen as ordered to maintain adequate oxygenation                - Encourage use of IS to promote lung expansion and prevent PN                - Monitor ABGs to assess oxygenation status                - Monitor blood oxygen level to maintain adequate oxygenation                - Encourage cough and deep breathing exercises to promote lung expansion                - Monitor patient's mental status for increased confusion    Outcome: Progressing     Problem: Excess Fluid Volume  Goal: Patient is able to achieve and maintain homeostasis  Description: INTERVENTIONS: Monitor for sign and symptoms of fluid overload  - Evaluate LE edema every shift  - Elevate LE to prevent dependent edema  - Apply BRIAN stockings as ordered   - Monitor ankle circumference daily  - Assess for  jugular vein distention  - Evaluate provider orders for the CHF diuretic algorithm. Administer diuretics as ordered  - Weigh the patient daily at 0600 and report a weight gain of five pounds or more   - Strict intake and output  - Monitor fluid intake and adhere to fluid restrictions  - Assess lung sounds every shift and as needed  - Monitor vital signs and lab values (CBC, chem, BUN, BNP)  - Measure and document urine output    Outcome: Progressing     Problem: Activity Intolerance  Goal: Patient is able to perform activities within their limitations  Description: INTERVENTIONS:                       -   Alternate periods of activity with periods of rest                 -   Patients is able to maintain normal vitals heart rhythm during activity                 -   Gradually increase activity and exercise as patient can tolerate                 -   Monitor blood pressure and heart before and after exercise                  -   Monitor blood oxygen saturation during activity and apply oxygen as needed    Outcome: Progressing

## 2025-07-20 NOTE — DISCHARGE SUPPORT
Case Management Assessment & Discharge Planning Note    Patient name Bailey Olsen  Location /-01 MRN [id number]  : 1970 Date 2025       Current Admission Date: 2025  Current Admission Diagnosis:Hypokalemia   Patient Active Problem List    Diagnosis Date Noted    Oliguria 2025    Acute renal failure with tubular necrosis (HCC) 2025    ATN (acute tubular necrosis) (HCC) 2025    Closed fracture of phalanx of right middle finger 2025    Closed fracture of twelfth thoracic vertebra (HCC) 2025    Stage 3a chronic kidney disease (HCC) 2025    Scalp hematoma 2025    Contusion of back 2025    Ecchymosis 2025    Falls 2025    Chest pain 2025    Abdominal pain 2025    Acute kidney injury (HCC) 2025    Gram-negative bacteremia 2025    Acute respiratory failure (HCC) 2025    Carcinoid tumor of pancreas 2025    Hypomagnesemia 2025    Upper GI bleed 2025    Acute anemia 2025    Rectal bleeding 2025    Acute blood loss anemia 2025    Chronic, continuous use of opioids 2025    Acute metabolic encephalopathy 2025    SIRS (systemic inflammatory response syndrome) ([facility]) 2025    Sacral fracture, closed ([facility]) 11/15/2024    KISHORE (acute kidney injury) ([facility]) 10/04/2024    Morbid obesity with BMI of 40.0-44.9, adult ([facility]) 10/04/2024    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) 2024    Fall from standing 2024    Closed compression fracture of L1 lumbar vertebra, sequela 2024    Closed stable burst fracture of first lumbar vertebra with routine healing 2024    Groin hematoma 2024    Ambulatory dysfunction 2024    Intractable back pain 2024    Fall 2024    Liver cirrhosis secondary to HYATT (nonalcoholic steatohepatitis) (HCC) 2024    History of malignant carcinoid tumor of small  intestine 05/26/2024    Class 3 severe obesity due to excess calories in adult 05/26/2024    Primary hypertension 05/26/2024    Hypokalemia 05/26/2024    Fibromyalgia 05/26/2024    Iron deficiency anemia 05/26/2024    Pancytopenia (HCC) 05/26/2024    Sarcoidosis 05/26/2024    Bilateral pulmonary infiltrates 05/26/2024      LOS (days): 4  Geometric Mean LOS (GMLOS) (days): 4.6  Days to GMLOS:0.6   Facility Authorization Initiated  [facility] received request for auth from : Gabriella ECHOLS  Authorization Request Submitted for: SNF  Requested Start of Care Date: 07/20/25  Facility Name: [facility]  Facility NPI: [id number]  Facility MD: Dr Rai  Facility MD NPI: [id number]  Authorization initiated by contacting insurance: [employer]  Via: [employer]  Clinicals submitted via: Portal Attachment  Pending reference #: [id number]   notified: Gabriella ECHOLS     Updates to authorization status will be noted in chart.    Please reach out to CM for updates on any clinical information.

## 2025-07-20 NOTE — PLAN OF CARE
Problem: PAIN - ADULT  Goal: Verbalizes/displays adequate comfort level or baseline comfort level  Description: Interventions:  - Encourage patient to monitor pain and request assistance  - Assess pain using appropriate pain scale  - Administer analgesics as ordered based on type and severity of pain and evaluate response  - Implement non-pharmacological measures as appropriate and evaluate response  - Consider cultural and social influences on pain and pain management  - Notify physician/advanced practitioner if interventions unsuccessful or patient reports new pain  - Educate patient/family on pain management process including their role and importance of  reporting pain   - Provide non-pharmacologic/complimentary pain relief interventions  Outcome: Progressing     Problem: INFECTION - ADULT  Goal: Absence or prevention of progression during hospitalization  Description: INTERVENTIONS:  - Assess and monitor for signs and symptoms of infection  - Monitor lab/diagnostic results  - Monitor all insertion sites, i.e. indwelling lines, tubes, and drains  - Monitor endotracheal if appropriate and nasal secretions for changes in amount and color  - [place] appropriate cooling/warming therapies per order  - Administer medications as ordered  - Instruct and encourage patient and family to use good hand hygiene technique  - Identify and instruct in appropriate isolation precautions for identified infection/condition  Outcome: Progressing     Problem: SAFETY ADULT  Goal: Patient will remain free of falls  Description: INTERVENTIONS:  - Educate patient/family on patient safety including physical limitations  - Instruct patient to call for assistance with activity   - Consider consulting OT/PT to assist with strengthening/mobility based on AM PAC & JH-HLM score  - Consult OT/PT to assist with strengthening/mobility   - Keep Call bell within reach  - Keep bed low and locked with side rails adjusted as appropriate  - Keep  care items and personal belongings within reach  - Initiate and maintain comfort rounds  - Make Fall Risk Sign visible to staff  - Offer Toileting every 2 Hours, in advance of need  - Initiate/Maintain bed alarm  - Obtain necessary fall risk management equipment: bed alarm   - Apply yellow socks and bracelet for high fall risk patients  - Consider moving patient to room near nurses station  Outcome: Progressing  Goal: Maintain or return to baseline ADL function  Description: INTERVENTIONS:  -  Assess patient's ability to carry out ADLs; assess patient's baseline for ADL function and identify physical deficits which impact ability to perform ADLs (bathing, care of mouth/teeth, toileting, grooming, dressing, etc.)  - Assess/evaluate cause of self-care deficits   - Assess range of motion  - Assess patient's mobility; develop plan if impaired  - Assess patient's need for assistive devices and provide as appropriate  - Encourage maximum independence but intervene and supervise when necessary  - Involve family in performance of ADLs  - Assess for home care needs following discharge   - Consider OT consult to assist with ADL evaluation and planning for discharge  - Provide patient education as appropriate  - Monitor functional capacity and physical performance, use of AM PAC & JH-HLM   - Monitor gait, balance and fatigue with ambulation    Outcome: Progressing  Goal: Maintains/Returns to pre admission functional level  Description: INTERVENTIONS:  - Perform AM-PAC 6 Click Basic Mobility/ Daily Activity assessment daily.  - Set and communicate daily mobility goal to care team and patient/family/caregiver.   - Collaborate with rehabilitation services on mobility goals if consulted  - Out of bed for toileting  - Record patient progress and toleration of activity level   Outcome: Progressing     Problem: DISCHARGE PLANNING  Goal: Discharge to home or other facility with appropriate resources  Description: INTERVENTIONS:  -  Identify barriers to discharge w/patient and caregiver  - Arrange for needed discharge resources and transportation as appropriate  - Identify discharge learning needs (meds, wound care, etc.)  - Arrange for interpretive services to assist at discharge as needed  - Refer to Case Management Department for coordinating discharge planning if the patient needs post-hospital services based on physician/advanced practitioner order or complex needs related to functional status, cognitive ability, or social support system  Outcome: Progressing     Problem: Knowledge Deficit  Goal: Patient/family/caregiver demonstrates understanding of disease process, treatment plan, medications, and discharge instructions  Description: Complete learning assessment and assess knowledge base.  Interventions:  - Provide teaching at level of understanding  - Provide teaching via preferred learning methods  Outcome: Progressing     Problem: METABOLIC, FLUID AND ELECTROLYTES - ADULT  Goal: Electrolytes maintained within normal limits  Description: INTERVENTIONS:  - Monitor labs and assess patient for signs and symptoms of electrolyte imbalances  - Administer electrolyte replacement as ordered  - Monitor response to electrolyte replacements, including repeat lab results as appropriate  - Instruct patient on fluid and nutrition as appropriate  Outcome: Progressing  Goal: Fluid balance maintained  Description: INTERVENTIONS:  - Monitor labs   - Monitor I/O and WT  - Instruct patient on fluid and nutrition as appropriate  - Assess for signs & symptoms of volume excess or deficit  Outcome: Progressing  Goal: Glucose maintained within target range  Description: INTERVENTIONS:  - Monitor Blood Glucose as ordered  - Assess for signs and symptoms of hyperglycemia and hypoglycemia  - Administer ordered medications to maintain glucose within target range  - Assess nutritional intake and initiate nutrition service referral as needed  Outcome: Progressing      Problem: Prexisting or High Potential for Compromised Skin Integrity  Goal: Skin integrity is maintained or improved  Description: INTERVENTIONS:  - Identify patients at risk for skin breakdown  - Assess and monitor skin integrity including under and around medical devices   - Assess and monitor nutrition and hydration status  - Monitor labs  - Assess for incontinence   - Turn and reposition patient  - Assist with mobility/ambulation  - Relieve pressure over mariano prominences   - Avoid friction and shearing  - Provide appropriate hygiene as needed including keeping skin clean and dry  - Evaluate need for skin moisturizer/barrier cream  - Collaborate with interdisciplinary team  - Patient/family teaching  - Consider wound care consult    Assess:  - Review Royce scale daily  Outcome: Progressing     Problem: Decreased Cardiac Output  Goal: Cardiac output adequate for individual needs  Description: INTERVENTIONS: Monitor for signs and symptoms of decreased cardiac output   - Monitor for dyspnea with exertion and at rest  - Monitor for orthopnea  - Monitor for signs of tachycardia. Place patient on telemetry monitoring.  - Assess patient for jugular vein distention  - Assess patient for lower extremity edema and poor peripheral perfusion   - Auscultate lung sound for Fine bibasilar crackles   - Monitor for cardiac arrythmias   - Administer beta blockers, antiarrhythmic, and blood pressure medications as ordered    Outcome: Progressing     Problem: Impaired Gas Exchange  Goal: Optimize oxygenation and ensure adequate ventilation  Description: INTERVENTIONS: Monitor for signs and symptoms of respiratory distress                - Elevate HOB or use high fowlers to promote lung expansion                - Administer oxygen as ordered to maintain adequate oxygenation                - Encourage use of IS to promote lung expansion and prevent PN                - Monitor ABGs to assess oxygenation status                -  Monitor blood oxygen level to maintain adequate oxygenation                - Encourage cough and deep breathing exercises to promote lung expansion                - Monitor patient's mental status for increased confusion    Outcome: Progressing     Problem: Excess Fluid Volume  Goal: Patient is able to achieve and maintain homeostasis  Description: INTERVENTIONS: Monitor for sign and symptoms of fluid overload  - Evaluate LE edema every shift  - Elevate LE to prevent dependent edema  - Apply BRIAN stockings as ordered   - Monitor ankle circumference daily  - Assess for jugular vein distention  - Evaluate provider orders for the CHF diuretic algorithm. Administer diuretics as ordered  - Weigh the patient daily at 0600 and report a weight gain of five pounds or more   - Strict intake and output  - Monitor fluid intake and adhere to fluid restrictions  - Assess lung sounds every shift and as needed  - Monitor vital signs and lab values (CBC, chem, BUN, BNP)  - Measure and document urine output    Outcome: Progressing     Problem: Activity Intolerance  Goal: Patient is able to perform activities within their limitations  Description: INTERVENTIONS:                       -   Alternate periods of activity with periods of rest                 -   Patients is able to maintain normal vitals heart rhythm during activity                 -   Gradually increase activity and exercise as patient can tolerate                 -   Monitor blood pressure and heart before and after exercise                  -   Monitor blood oxygen saturation during activity and apply oxygen as needed    Outcome: Progressing     Problem: Knowledge Deficit  Goal: Patient is able to verbalize understanding of Heart Failure after education  Description: INTERVENTIONS:  - Educate the patient and family on signs and symptoms of HF  - Provide the patient with HF education and HF zone tool  - Educate on the importance of daily weight in the AM and reporting a  weight gain               of 3 or more pounds to their primary care physician  - Monitor for SOB  - Maintain and sodium and fluid restriction  - Educate the patient on the importance of medications such as: diuretics, betablockers,               antiarrhythmics and their purpose, dose, route, side effects and labs               if they are needed    Outcome: Progressing

## 2025-07-20 NOTE — ASSESSMENT & PLAN NOTE
Will use narcotics, continue to monitor.  During the run, discussed with patient and her  at the bedside in presence of case management and nurse-regarding pain management.  Patient has liver cirrhosis which can affect adequate pain management only using with narcotics due to concern of encephalopathy.  Verbalized understanding agrees.

## 2025-07-21 PROBLEM — R34 OLIGURIA: Status: RESOLVED | Noted: 2025-07-18 | Resolved: 2025-07-21

## 2025-07-21 LAB
ALBUMIN SERPL BCG-MCNC: 3.4 G/DL (ref 3.5–5)
ALP SERPL-CCNC: 200 U/L (ref 34–104)
ALT SERPL W P-5'-P-CCNC: 24 U/L (ref 7–52)
ANION GAP SERPL CALCULATED.3IONS-SCNC: 7 MMOL/L (ref 4–13)
AST SERPL W P-5'-P-CCNC: 70 U/L (ref 13–39)
BASOPHILS # BLD AUTO: 0.03 THOUSANDS/ÂΜL (ref 0–0.1)
BASOPHILS NFR BLD AUTO: 1 % (ref 0–1)
BILIRUB SERPL-MCNC: 4.71 MG/DL (ref 0.2–1)
BUN SERPL-MCNC: 12 MG/DL (ref 5–25)
CALCIUM ALBUM COR SERPL-MCNC: 9.1 MG/DL (ref 8.3–10.1)
CALCIUM SERPL-MCNC: 8.6 MG/DL (ref 8.4–10.2)
CHLORIDE SERPL-SCNC: 92 MMOL/L (ref 96–108)
CO2 SERPL-SCNC: 38 MMOL/L (ref 21–32)
CREAT SERPL-MCNC: 0.96 MG/DL (ref 0.6–1.3)
EOSINOPHIL # BLD AUTO: 0.06 THOUSAND/ÂΜL (ref 0–0.61)
EOSINOPHIL NFR BLD AUTO: 2 % (ref 0–6)
ERYTHROCYTE [DISTWIDTH] IN BLOOD BY AUTOMATED COUNT: 18.9 % (ref 11.6–15.1)
GFR SERPL CREATININE-BSD FRML MDRD: 66 ML/MIN/1.73SQ M
GLUCOSE SERPL-MCNC: 87 MG/DL (ref 65–140)
HCT VFR BLD AUTO: 26.8 % (ref 34.8–46.1)
HGB BLD-MCNC: 8.3 G/DL (ref 11.5–15.4)
IMM GRANULOCYTES # BLD AUTO: 0.01 THOUSAND/UL (ref 0–0.2)
IMM GRANULOCYTES NFR BLD AUTO: 0 % (ref 0–2)
LYMPHOCYTES # BLD AUTO: 0.81 THOUSANDS/ÂΜL (ref 0.6–4.47)
LYMPHOCYTES NFR BLD AUTO: 22 % (ref 14–44)
MAGNESIUM SERPL-MCNC: 1.7 MG/DL (ref 1.9–2.7)
MCH RBC QN AUTO: 30.2 PG (ref 26.8–34.3)
MCHC RBC AUTO-ENTMCNC: 31 G/DL (ref 31.4–37.4)
MCV RBC AUTO: 98 FL (ref 82–98)
MONOCYTES # BLD AUTO: 0.32 THOUSAND/ÂΜL (ref 0.17–1.22)
MONOCYTES NFR BLD AUTO: 9 % (ref 4–12)
NEUTROPHILS # BLD AUTO: 2.47 THOUSANDS/ÂΜL (ref 1.85–7.62)
NEUTS SEG NFR BLD AUTO: 66 % (ref 43–75)
NRBC BLD AUTO-RTO: 0 /100 WBCS
PLATELET # BLD AUTO: 71 THOUSANDS/UL (ref 149–390)
PMV BLD AUTO: 11.1 FL (ref 8.9–12.7)
POTASSIUM SERPL-SCNC: 3.2 MMOL/L (ref 3.5–5.3)
PROT SERPL-MCNC: 6.7 G/DL (ref 6.4–8.4)
RBC # BLD AUTO: 2.75 MILLION/UL (ref 3.81–5.12)
SODIUM SERPL-SCNC: 137 MMOL/L (ref 135–147)
WBC # BLD AUTO: 3.7 THOUSAND/UL (ref 4.31–10.16)

## 2025-07-21 PROCEDURE — 99232 SBSQ HOSP IP/OBS MODERATE 35: CPT | Performed by: INTERNAL MEDICINE

## 2025-07-21 PROCEDURE — 85025 COMPLETE CBC W/AUTO DIFF WBC: CPT | Performed by: FAMILY MEDICINE

## 2025-07-21 PROCEDURE — 80053 COMPREHEN METABOLIC PANEL: CPT | Performed by: FAMILY MEDICINE

## 2025-07-21 PROCEDURE — 83735 ASSAY OF MAGNESIUM: CPT | Performed by: INTERNAL MEDICINE

## 2025-07-21 PROCEDURE — 99233 SBSQ HOSP IP/OBS HIGH 50: CPT | Performed by: FAMILY MEDICINE

## 2025-07-21 RX ORDER — POTASSIUM CHLORIDE 1500 MG/1
40 TABLET, EXTENDED RELEASE ORAL ONCE
Status: COMPLETED | OUTPATIENT
Start: 2025-07-21 | End: 2025-07-21

## 2025-07-21 RX ORDER — CARVEDILOL 3.12 MG/1
3.12 TABLET ORAL 2 TIMES DAILY WITH MEALS
Status: DISCONTINUED | OUTPATIENT
Start: 2025-07-21 | End: 2025-07-22 | Stop reason: HOSPADM

## 2025-07-21 RX ORDER — MAGNESIUM SULFATE HEPTAHYDRATE 40 MG/ML
2 INJECTION, SOLUTION INTRAVENOUS ONCE
Status: COMPLETED | OUTPATIENT
Start: 2025-07-21 | End: 2025-07-21

## 2025-07-21 RX ORDER — ALBUMIN (HUMAN) 12.5 G/50ML
12.5 SOLUTION INTRAVENOUS ONCE
Status: COMPLETED | OUTPATIENT
Start: 2025-07-21 | End: 2025-07-21

## 2025-07-21 RX ADMIN — LIDOCAINE 5% 1 PATCH: 700 PATCH TOPICAL at 09:00

## 2025-07-21 RX ADMIN — RIFAXIMIN 550 MG: 550 TABLET ORAL at 09:00

## 2025-07-21 RX ADMIN — LACTULOSE 30 G: 10 SOLUTION ORAL at 21:49

## 2025-07-21 RX ADMIN — LACTULOSE 30 G: 10 SOLUTION ORAL at 08:59

## 2025-07-21 RX ADMIN — CARVEDILOL 3.12 MG: 3.12 TABLET, FILM COATED ORAL at 17:53

## 2025-07-21 RX ADMIN — FOLIC ACID 1 MG: 1 TABLET ORAL at 09:00

## 2025-07-21 RX ADMIN — HEPARIN SODIUM 5000 UNITS: 5000 INJECTION INTRAVENOUS; SUBCUTANEOUS at 14:32

## 2025-07-21 RX ADMIN — MAGNESIUM SULFATE HEPTAHYDRATE 2 G: 40 INJECTION, SOLUTION INTRAVENOUS at 10:45

## 2025-07-21 RX ADMIN — DAPSONE 50 MG: 25 TABLET ORAL at 09:01

## 2025-07-21 RX ADMIN — AMITRIPTYLINE HYDROCHLORIDE 50 MG: 25 TABLET, FILM COATED ORAL at 21:47

## 2025-07-21 RX ADMIN — PANTOPRAZOLE SODIUM 40 MG: 40 TABLET, DELAYED RELEASE ORAL at 05:02

## 2025-07-21 RX ADMIN — URSODIOL 300 MG: 300 CAPSULE ORAL at 09:00

## 2025-07-21 RX ADMIN — OXYCODONE HYDROCHLORIDE 10 MG: 10 TABLET ORAL at 05:02

## 2025-07-21 RX ADMIN — POLYETHYLENE GLYCOL 3350 17 G: 17 POWDER, FOR SOLUTION ORAL at 08:59

## 2025-07-21 RX ADMIN — ALBUMIN (HUMAN) 12.5 G: 0.25 INJECTION, SOLUTION INTRAVENOUS at 12:11

## 2025-07-21 RX ADMIN — RIFAXIMIN 550 MG: 550 TABLET ORAL at 21:47

## 2025-07-21 RX ADMIN — HEPARIN SODIUM 5000 UNITS: 5000 INJECTION INTRAVENOUS; SUBCUTANEOUS at 05:02

## 2025-07-21 RX ADMIN — GLYCERIN 1 SUPPOSITORY: 2 SUPPOSITORY RECTAL at 11:17

## 2025-07-21 RX ADMIN — POTASSIUM CHLORIDE 40 MEQ: 1500 TABLET, EXTENDED RELEASE ORAL at 09:00

## 2025-07-21 RX ADMIN — HEPARIN SODIUM 5000 UNITS: 5000 INJECTION INTRAVENOUS; SUBCUTANEOUS at 21:47

## 2025-07-21 RX ADMIN — LACTULOSE 30 G: 10 SOLUTION ORAL at 17:53

## 2025-07-21 RX ADMIN — LACTULOSE 30 G: 10 SOLUTION ORAL at 11:17

## 2025-07-21 RX ADMIN — URSODIOL 300 MG: 300 CAPSULE ORAL at 17:53

## 2025-07-21 NOTE — CASE MANAGEMENT
Case Management Discharge Planning Note    Patient name Bailey Olsen  Location /-01 MRN [id number]  : 1970 Date 2025       Current Admission Date: 2025  Current Admission Diagnosis:Hypokalemia   Patient Active Problem List    Diagnosis Date Noted    Acute renal failure with tubular necrosis (HCC) 2025    ATN (acute tubular necrosis) (HCC) 2025    Closed fracture of phalanx of right middle finger 2025    Closed fracture of twelfth thoracic vertebra (HCC) 2025    Stage 3a chronic kidney disease (HCC) 2025    Scalp hematoma 2025    Contusion of back 2025    Ecchymosis 2025    Falls 2025    Chest pain 2025    Abdominal pain 2025    Acute kidney injury (HCC) 2025    Gram-negative bacteremia 2025    Acute respiratory failure (HCC) 2025    Carcinoid tumor of pancreas 2025    Hypomagnesemia 2025    Upper GI bleed 2025    Acute anemia 2025    Rectal bleeding 2025    Acute blood loss anemia 2025    Chronic, continuous use of opioids 2025    Acute metabolic encephalopathy 2025    SIRS (systemic inflammatory response syndrome) ([facility]) 2025    Sacral fracture, closed ([facility]) 11/15/2024    KISHORE (acute kidney injury) ([facility]) 10/04/2024    Morbid obesity with BMI of 40.0-44.9, adult ([facility]) 10/04/2024    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) 2024    Fall from standing 2024    Closed compression fracture of L1 lumbar vertebra, sequela 2024    Closed stable burst fracture of first lumbar vertebra with routine healing 2024    Groin hematoma 2024    Ambulatory dysfunction 2024    Intractable back pain 2024    Fall 2024    Liver cirrhosis secondary to HYATT (nonalcoholic steatohepatitis) (HCC) 2024    History of malignant carcinoid tumor of small intestine 2024    Class 3 severe obesity  due to excess calories in adult 05/26/2024    Primary hypertension 05/26/2024    Hypokalemia 05/26/2024    Fibromyalgia 05/26/2024    Iron deficiency anemia 05/26/2024    Pancytopenia (HCC) 05/26/2024    Sarcoidosis 05/26/2024    Bilateral pulmonary infiltrates 05/26/2024      LOS (days): 5  Geometric Mean LOS (GMLOS) (days): 4.6  Days to GMLOS:-0.6     OBJECTIVE:  Risk of Unplanned Readmission Score: 60.4         Current admission status: Inpatient   Preferred Pharmacy:   [facility] [address] [place] [address] [facility] [address] [facility]  [address] [facility] [address]  Phone: [phone] Fax: [phone]    [id number] - [place], [place] - [address] [facility]  [address] [place] [id number]  Phone: [phone] Fax: [phone]    Primary Care Provider: Nayely Brown DO    Primary Insurance: MEDICARE  Secondary Insurance: BLUE CROSS    DISCHARGE DETAILS:                    CM made aware patient has $209.50 per day for STR.      CM met with patient and spouse to discuss STR copay.  Spouse agreeable to copay indicating patient needs STR.    CM discussed UDS with spouse, suggesting he call to explore Medicaid eligibility while patient in STR to explore securing HHC Aides when patient returns home.  CM provided spouse with UDS contact information and number.

## 2025-07-21 NOTE — ASSESSMENT & PLAN NOTE
Per nurse, patient mentation varies and fluctuates  During the rounds with this provider, patient was alert and oriented  With nurse, overnight patient was Confused  Per chart review, patient did not have any bowel movement for last 2 days  We will try to continue lactulose, a dose of glycerin suppository ordered, also will use enema  Check ammonia level

## 2025-07-21 NOTE — PROGRESS NOTES
Progress Note - Hospitalist   Name: Bailey Olsen 54 y.o. female I MRN: [id number]  Unit/Bed#: -01 I Date of Admission: 7/16/2025   Date of Service: 7/21/2025 I Hospital Day: 5    Assessment & Plan  Acute metabolic encephalopathy  Per nurse, patient mentation varies and fluctuates  During the rounds with this provider, patient was alert and oriented  With nurse, overnight patient was Confused  Per chart review, patient did not have any bowel movement for last 2 days  We will try to continue lactulose, a dose of glycerin suppository ordered, also will use enema  Check ammonia level  Acute renal failure with tubular necrosis (HCC)  Baseline creatinine around 0.9  Creatinine is 1.7  Patient did not make enough urine  Suspect contrast induced ATN-secondary to contrast exposure, hypertension, medication side effect  Initially patient started Lasix drip with significant improvement, renal function is improving.  Lasix drip discontinued  Hypokalemia  Chronic hypokalemia, continue to supplement  Fall  Came after fall  Shows left periorbital bruise, multiple bruises on right lateral chest, right buttock area, left knee  Patient also has T12 fracture, right third finger fracture  PainManagement, case management  For vertebral fracture, discussed with neurosurgery, recommending TLSO brace, upright positions,  PT OT recommending level 2  7/19/25:Complaining of left upper abdominal and chest pain, early morning a piece of wall fell off on her chest and left shoulder area.  -X-ray of the left shoulder and left lateral ribs reviewed, no fracture noted   Closed fracture of phalanx of right middle finger  Secondary to fall, imaging reviewed  Continue pain management, embolization, can use finger splint  Follow orthopedic recommendation  Continue diane tape per orthopedic recommendation  Closed fracture of twelfth thoracic vertebra (HCC)  There is a new mild superior endplate compression fracture at T12 compared to May 2025.  Status post kyphoplasty at L1. There is a healing fracture of the right anterior fourth rib.   Appreciate neurosurgery recommendation, upright position, TLSO brace.    Has recent history of fall  Complaining of lower back pain  Continue pain management  No neurodeficit  Due to recommending level 2  Liver cirrhosis secondary to HYATT (nonalcoholic steatohepatitis) (HCC)  Continue conservative management with home medications.  Pancytopenia (HCC)  Secondary to liver cirrhosis, continue to monitor, no active bleeding noted  Morbid obesity with BMI of 40.0-44.9, adult (HCC)  Lifestyle modification.  Chronic, continuous use of opioids  Will use narcotics, continue to monitor.  During the run, discussed with patient and her  at the bedside in presence of case management and nurse-regarding pain management.  Patient has liver cirrhosis which can affect adequate pain management only using with narcotics due to concern of encephalopathy.  Verbalized understanding agrees.    VTE Pharmacologic Prophylaxis: VTE Score: 5 High Risk (Score >/= 5) - Pharmacological DVT Prophylaxis Ordered: heparin. Sequential Compression Devices Ordered.    Mobility:   Basic Mobility Inpatient Raw Score: 11  JH-HLM Goal: 4: Move to chair/commode  JH-HLM Achieved: 4: Move to chair/commode  JH-HLM Goal achieved. Continue to encourage appropriate mobility.    Patient Centered Rounds: I performed bedside rounds with nursing staff today.   Discussions with Specialists or Other Care Team Provider: Nephrology    Education and Discussions with Family / Patient: Updated  (sister) via phone.  Sister Fidelina updated over the phone    Current Length of Stay: 5 day(s)  Current Patient Status: Inpatient   Certification Statement: The patient will continue to require additional inpatient hospital stay due to acute metabolic encephalopathy  Discharge Plan: Anticipate discharge in 48-72 hrs to rehab facility.    Code Status: Level 1 - Full  Code    Subjective   Seen and evaluated during the rounds.  Patient is alert and oriented, following directions, other than mild pain denies any significant complaint.  Did not have any bowel movement.    Objective :  Temp:  [96.8 °F (36 °C)-97.5 °F (36.4 °C)] 97 °F (36.1 °C)  HR:  [] 104  BP: (116-142)/(70-80) 119/71  Resp:  [18-19] 18  SpO2:  [90 %-96 %] 90 %  O2 Device: Nasal cannula  Nasal Cannula O2 Flow Rate (L/min):  [2 L/min] 2 L/min    Body mass index is 39.68 kg/m².     Input and Output Summary (last 24 hours):     Intake/Output Summary (Last 24 hours) at 7/21/2025 1633  Last data filed at 7/21/2025 1302  Gross per 24 hour   Intake 790 ml   Output 1484 ml   Net -694 ml       Physical Exam  Vitals and nursing note reviewed. Exam conducted with a chaperone present.   Constitutional:       Appearance: She is obese.     Eyes:      General: Scleral icterus present.      Extraocular Movements: Extraocular movements intact.      Pupils: Pupils are equal, round, and reactive to light.       Cardiovascular:      Rate and Rhythm: Normal rate.      Heart sounds:      No friction rub. No gallop.   Pulmonary:      Effort: No respiratory distress.      Breath sounds: No wheezing.   Abdominal:      General: There is no distension.      Palpations: There is no mass.      Tenderness: There is abdominal tenderness.      Hernia: No hernia is present.     Skin:     Findings: Bruising (diffetent parts of the body as described in previous days note.) present.     Neurological:      Mental Status: She is alert.      Comments: Patient mentation fluctuates, during the rounds with this provider, patient is alert and oriented and answering questions appropriately.       Lines/Drains:              Lab Results: I have reviewed the following results:   Results from last 7 days   Lab Units 07/21/25  0503   WBC Thousand/uL 3.70*   HEMOGLOBIN g/dL 8.3*   HEMATOCRIT % 26.8*   PLATELETS Thousands/uL 71*   SEGS PCT % 66   LYMPHO PCT %  22   MONO PCT % 9   EOS PCT % 2     Results from last 7 days   Lab Units 07/21/25  0503   SODIUM mmol/L 137   POTASSIUM mmol/L 3.2*   CHLORIDE mmol/L 92*   CO2 mmol/L 38*   BUN mg/dL 12   CREATININE mg/dL 0.96   ANION GAP mmol/L 7   CALCIUM mg/dL 8.6   ALBUMIN g/dL 3.4*   TOTAL BILIRUBIN mg/dL 4.71*   ALK PHOS U/L 200*   ALT U/L 24   AST U/L 70*   GLUCOSE RANDOM mg/dL 87     Results from last 7 days   Lab Units 07/16/25  1009   INR  1.95*                   Recent Cultures (last 7 days):         Imaging Results Review: No pertinent imaging studies reviewed.  Other Study Results Review: No additional pertinent studies reviewed.    Last 24 Hours Medication List:     Current Facility-Administered Medications:     amitriptyline (ELAVIL) tablet 50 mg, HS    cyclobenzaprine (FLEXERIL) tablet 10 mg, TID PRN    dapsone tablet 50 mg, Daily    folic acid (FOLVITE) tablet 1 mg, Daily    heparin (porcine) subcutaneous injection 5,000 Units, Q8H MIGUEL    lactulose (CHRONULAC) oral solution 30 g, 4x Daily    lidocaine (LIDODERM) 5 % patch 1 patch, Daily    [Held by provider] metoprolol tartrate (LOPRESSOR) tablet 25 mg, Q12H MIGUEL    oxyCODONE (ROXICODONE) immediate release tablet 10 mg, Q6H PRN    pantoprazole (PROTONIX) EC tablet 40 mg, Early Morning    polyethylene glycol (MIRALAX) packet 17 g, Daily    rifaximin (XIFAXAN) tablet 550 mg, Q12H MIGUEL    trimethobenzamide (TIGAN) IM injection 200 mg, Q6H PRN    ursodiol (ACTIGALL) capsule 300 mg, BID    Administrative Statements   Today, Patient Was Seen By: Ruslan Keyes MD      **Please Note: This note may have been constructed using a voice recognition system.**

## 2025-07-21 NOTE — CASE MANAGEMENT
Case Management Discharge Planning Note    Patient name Bailey Olsen  Location /-01 MRN [id number]  : 1970 Date 2025       Current Admission Date: 2025  Current Admission Diagnosis:Hypokalemia   Patient Active Problem List    Diagnosis Date Noted    Acute renal failure with tubular necrosis (HCC) 2025    ATN (acute tubular necrosis) (HCC) 2025    Closed fracture of phalanx of right middle finger 2025    Closed fracture of twelfth thoracic vertebra (HCC) 2025    Stage 3a chronic kidney disease (HCC) 2025    Scalp hematoma 2025    Contusion of back 2025    Ecchymosis 2025    Falls 2025    Chest pain 2025    Abdominal pain 2025    Acute kidney injury (HCC) 2025    Gram-negative bacteremia 2025    Acute respiratory failure (HCC) 2025    Carcinoid tumor of pancreas 2025    Hypomagnesemia 2025    Upper GI bleed 2025    Acute anemia 2025    Rectal bleeding 2025    Acute blood loss anemia 2025    Chronic, continuous use of opioids 2025    Acute metabolic encephalopathy 2025    SIRS (systemic inflammatory response syndrome) ([facility]) 2025    Sacral fracture, closed ([facility]) 11/15/2024    KISHORE (acute kidney injury) ([facility]) 10/04/2024    Morbid obesity with BMI of 40.0-44.9, adult ([facility]) 10/04/2024    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) 2024    Fall from standing 2024    Closed compression fracture of L1 lumbar vertebra, sequela 2024    Closed stable burst fracture of first lumbar vertebra with routine healing 2024    Groin hematoma 2024    Ambulatory dysfunction 2024    Intractable back pain 2024    Fall 2024    Liver cirrhosis secondary to HYATT (nonalcoholic steatohepatitis) (HCC) 2024    History of malignant carcinoid tumor of small intestine 2024    Class 3 severe obesity  due to excess calories in adult 05/26/2024    Primary hypertension 05/26/2024    Hypokalemia 05/26/2024    Fibromyalgia 05/26/2024    Iron deficiency anemia 05/26/2024    Pancytopenia (HCC) 05/26/2024    Sarcoidosis 05/26/2024    Bilateral pulmonary infiltrates 05/26/2024      LOS (days): 5  Geometric Mean LOS (GMLOS) (days): 4.6  Days to GMLOS:-0.5     OBJECTIVE:  Risk of Unplanned Readmission Score: 60.33         Current admission status: Inpatient   Preferred Pharmacy:   [facility] [place], [place] - [address]  [address] [facility] [address]  Phone: [phone] Fax: [phone]    [facility]/pharmacy #[id number] - [place], [place] - [address]  [address] [address]  Phone: [phone] Fax: [phone]    Primary Care Provider: Nayely Brown DO    Primary Insurance: MEDICARE  Secondary Insurance: BLUE CROSS    DISCHARGE DETAILS:    Discharge planning discussed with:: patient and sister, Fidelina                           Requested Home Health Care         Is the patient interested in HHC at discharge?: No    DME Referral Provided  Referral made for DME?: No    Other Referral/Resources/Interventions Provided:  Interventions: Short Term Rehab  Referral Comments: [facility]         Treatment Team Recommendation: Short Term Rehab  Expected Discharge Disposition: Skilled Nursing Facility                     1230 CM met with patient and sister at bedside to discuss discharge disposition.  Patient desires [facility].  Sister asked CM to explore copay for patient as she was recently discharged from [place].    Patient also asked for her medical records to be released to her sister today.  CM had patient sign consent for her medical records to be released to her sister, Fidelina Butler.      CM communicated in [facility] with [facility] to inquire on copay for patients STR admission. CM to follow.

## 2025-07-21 NOTE — PLAN OF CARE
Problem: PAIN - ADULT  Goal: Verbalizes/displays adequate comfort level or baseline comfort level  Description: Interventions:  - Encourage patient to monitor pain and request assistance  - Assess pain using appropriate pain scale  - Administer analgesics as ordered based on type and severity of pain and evaluate response  - Implement non-pharmacological measures as appropriate and evaluate response  - Consider cultural and social influences on pain and pain management  - Notify physician/advanced practitioner if interventions unsuccessful or patient reports new pain  - Educate patient/family on pain management process including their role and importance of  reporting pain   - Provide non-pharmacologic/complimentary pain relief interventions  Outcome: Progressing     Problem: INFECTION - ADULT  Goal: Absence or prevention of progression during hospitalization  Description: INTERVENTIONS:  - Assess and monitor for signs and symptoms of infection  - Monitor lab/diagnostic results  - Monitor all insertion sites, i.e. indwelling lines, tubes, and drains  - Monitor endotracheal if appropriate and nasal secretions for changes in amount and color  - [place] appropriate cooling/warming therapies per order  - Administer medications as ordered  - Instruct and encourage patient and family to use good hand hygiene technique  - Identify and instruct in appropriate isolation precautions for identified infection/condition  Outcome: Progressing     Problem: SAFETY ADULT  Goal: Patient will remain free of falls  Description: INTERVENTIONS:  - Educate patient/family on patient safety including physical limitations  - Instruct patient to call for assistance with activity   - Consider consulting OT/PT to assist with strengthening/mobility based on AM PAC & JH-HLM score  - Consult OT/PT to assist with strengthening/mobility   - Keep Call bell within reach  - Keep bed low and locked with side rails adjusted as appropriate  - Keep  care items and personal belongings within reach  - Initiate and maintain comfort rounds  - Make Fall Risk Sign visible to staff  - Offer Toileting every 2 Hours, in advance of need  - Initiate/Maintain bed alarm  - Obtain necessary fall risk management equipment: non-skid socks  - Apply yellow socks and bracelet for high fall risk patients  - Consider moving patient to room near nurses station  Outcome: Progressing  Goal: Maintain or return to baseline ADL function  Description: INTERVENTIONS:  -  Assess patient's ability to carry out ADLs; assess patient's baseline for ADL function and identify physical deficits which impact ability to perform ADLs (bathing, care of mouth/teeth, toileting, grooming, dressing, etc.)  - Assess/evaluate cause of self-care deficits   - Assess range of motion  - Assess patient's mobility; develop plan if impaired  - Assess patient's need for assistive devices and provide as appropriate  - Encourage maximum independence but intervene and supervise when necessary  - Involve family in performance of ADLs  - Assess for home care needs following discharge   - Consider OT consult to assist with ADL evaluation and planning for discharge  - Provide patient education as appropriate  - Monitor functional capacity and physical performance, use of AM PAC & -HLM   - Monitor gait, balance and fatigue with ambulation    Outcome: Progressing  Goal: Maintains/Returns to pre admission functional level  Description: INTERVENTIONS:  - Perform AM-PAC 6 Click Basic Mobility/ Daily Activity assessment daily.  - Set and communicate daily mobility goal to care team and patient/family/caregiver.   - Collaborate with rehabilitation services on mobility goals if consulted  - Perform Range of Motion 3 times a day.  - Reposition patient every 2 hours.  - Dangle patient 3 times a day  - Stand patient 3 times a day  - Ambulate patient 3 times a day  - Out of bed to chair 3 times a day   - Out of bed for meals 3  times a day  - Out of bed for toileting  - Record patient progress and toleration of activity level   Outcome: Progressing     Problem: DISCHARGE PLANNING  Goal: Discharge to home or other facility with appropriate resources  Description: INTERVENTIONS:  - Identify barriers to discharge w/patient and caregiver  - Arrange for needed discharge resources and transportation as appropriate  - Identify discharge learning needs (meds, wound care, etc.)  - Arrange for interpretive services to assist at discharge as needed  - Refer to Case Management Department for coordinating discharge planning if the patient needs post-hospital services based on physician/advanced practitioner order or complex needs related to functional status, cognitive ability, or social support system  Outcome: Progressing     Problem: Knowledge Deficit  Goal: Patient/family/caregiver demonstrates understanding of disease process, treatment plan, medications, and discharge instructions  Description: Complete learning assessment and assess knowledge base.  Interventions:  - Provide teaching at level of understanding  - Provide teaching via preferred learning methods  Outcome: Progressing     Problem: METABOLIC, FLUID AND ELECTROLYTES - ADULT  Goal: Electrolytes maintained within normal limits  Description: INTERVENTIONS:  - Monitor labs and assess patient for signs and symptoms of electrolyte imbalances  - Administer electrolyte replacement as ordered  - Monitor response to electrolyte replacements, including repeat lab results as appropriate  - Instruct patient on fluid and nutrition as appropriate  Outcome: Progressing  Goal: Fluid balance maintained  Description: INTERVENTIONS:  - Monitor labs   - Monitor I/O and WT  - Instruct patient on fluid and nutrition as appropriate  - Assess for signs & symptoms of volume excess or deficit  Outcome: Progressing  Goal: Glucose maintained within target range  Description: INTERVENTIONS:  - Monitor Blood  Glucose as ordered  - Assess for signs and symptoms of hyperglycemia and hypoglycemia  - Administer ordered medications to maintain glucose within target range  - Assess nutritional intake and initiate nutrition service referral as needed  Outcome: Progressing     Problem: Prexisting or High Potential for Compromised Skin Integrity  Goal: Skin integrity is maintained or improved  Description: INTERVENTIONS:  - Identify patients at risk for skin breakdown  - Assess and monitor skin integrity including under and around medical devices   - Assess and monitor nutrition and hydration status  - Monitor labs  - Assess for incontinence   - Turn and reposition patient  - Assist with mobility/ambulation  - Relieve pressure over mariano prominences   - Avoid friction and shearing  - Provide appropriate hygiene as needed including keeping skin clean and dry  - Evaluate need for skin moisturizer/barrier cream  - Collaborate with interdisciplinary team  - Patient/family teaching  - Consider wound care consult      Problem: Impaired Gas Exchange  Goal: Optimize oxygenation and ensure adequate ventilation  Description: INTERVENTIONS: Monitor for signs and symptoms of respiratory distress                - Elevate HOB or use high fowlers to promote lung expansion                - Administer oxygen as ordered to maintain adequate oxygenation                - Encourage use of IS to promote lung expansion and prevent PN                - Monitor ABGs to assess oxygenation status                - Monitor blood oxygen level to maintain adequate oxygenation                - Encourage cough and deep breathing exercises to promote lung expansion                - Monitor patient's mental status for increased confusion    Outcome: Progressing     Problem: Excess Fluid Volume  Goal: Patient is able to achieve and maintain homeostasis  Description: INTERVENTIONS: Monitor for sign and symptoms of fluid overload  - Evaluate LE edema every  shift  - Elevate LE to prevent dependent edema  - Apply BRIAN stockings as ordered   - Monitor ankle circumference daily  - Assess for jugular vein distention  - Evaluate provider orders for the CHF diuretic algorithm. Administer diuretics as ordered  - Weigh the patient daily at 0600 and report a weight gain of five pounds or more   - Strict intake and output  - Monitor fluid intake and adhere to fluid restrictions  - Assess lung sounds every shift and as needed  - Monitor vital signs and lab values (CBC, chem, BUN, BNP)  - Measure and document urine output    Outcome: Progressing     Problem: Decreased Cardiac Output  Goal: Cardiac output adequate for individual needs  Description: INTERVENTIONS: Monitor for signs and symptoms of decreased cardiac output   - Monitor for dyspnea with exertion and at rest  - Monitor for orthopnea  - Monitor for signs of tachycardia. Place patient on telemetry monitoring.  - Assess patient for jugular vein distention  - Assess patient for lower extremity edema and poor peripheral perfusion   - Auscultate lung sound for Fine bibasilar crackles   - Monitor for cardiac arrythmias   - Administer beta blockers, antiarrhythmic, and blood pressure medications as ordered    Outcome: Progressing     Problem: Activity Intolerance  Goal: Patient is able to perform activities within their limitations  Description: INTERVENTIONS:                       -   Alternate periods of activity with periods of rest                 -   Patients is able to maintain normal vitals heart rhythm during activity                 -   Gradually increase activity and exercise as patient can tolerate                 -   Monitor blood pressure and heart before and after exercise                  -   Monitor blood oxygen saturation during activity and apply oxygen as needed    Outcome: Progressing     Problem: Knowledge Deficit  Goal: Patient is able to verbalize understanding of Heart Failure after  education  Description: INTERVENTIONS:  - Educate the patient and family on signs and symptoms of HF  - Provide the patient with HF education and HF zone tool  - Educate on the importance of daily weight in the AM and reporting a weight gain               of 3 or more pounds to their primary care physician  - Monitor for SOB  - Maintain and sodium and fluid restriction  - Educate the patient on the importance of medications such as: diuretics, betablockers,               antiarrhythmics and their purpose, dose, route, side effects and labs               if they are needed    Outcome: Progressing

## 2025-07-21 NOTE — ASSESSMENT & PLAN NOTE
Baseline creatinine 0.9-1.1 mg/dL     Oliguric ATN KISHORE (contrast, Toradol, hypotension, lisinopril, spironolactone) versus HRS.  Status post Lasix stress test with robust urine output 5.4 L in 24 hours.  Lasix infusion discontinued July 19.  Home torsemide, lisinopril, spironolactone remains on hold.    Regarding plan of care today on July 21: Creatinine remains in steady state.  Creatinine is 0.9.  Will continue to hold spironolactone and torsemide today.  Replace potassium.  Check magnesium levels.  The patient has lost approximately 14 pounds since she has been here.  Again restarting oral diuretic tomorrow.  Given tachycardia will give 1 dose of IV albumin.  Hemoglobin level stable in the 8 range.

## 2025-07-21 NOTE — PLAN OF CARE
Problem: PAIN - ADULT  Goal: Verbalizes/displays adequate comfort level or baseline comfort level  Description: Interventions:  - Encourage patient to monitor pain and request assistance  - Assess pain using appropriate pain scale  - Administer analgesics as ordered based on type and severity of pain and evaluate response  - Implement non-pharmacological measures as appropriate and evaluate response  - Consider cultural and social influences on pain and pain management  - Notify physician/advanced practitioner if interventions unsuccessful or patient reports new pain  - Educate patient/family on pain management process including their role and importance of  reporting pain   - Provide non-pharmacologic/complimentary pain relief interventions  Outcome: Progressing     Problem: INFECTION - ADULT  Goal: Absence or prevention of progression during hospitalization  Description: INTERVENTIONS:  - Assess and monitor for signs and symptoms of infection  - Monitor lab/diagnostic results  - Monitor all insertion sites, i.e. indwelling lines, tubes, and drains  - Monitor endotracheal if appropriate and nasal secretions for changes in amount and color  - [place] appropriate cooling/warming therapies per order  - Administer medications as ordered  - Instruct and encourage patient and family to use good hand hygiene technique  - Identify and instruct in appropriate isolation precautions for identified infection/condition  Outcome: Progressing     Problem: SAFETY ADULT  Goal: Patient will remain free of falls  Description: INTERVENTIONS:  - Educate patient/family on patient safety including physical limitations  - Instruct patient to call for assistance with activity   - Consider consulting OT/PT to assist with strengthening/mobility based on AM PAC & JH-HLM score  - Consult OT/PT to assist with strengthening/mobility   - Keep Call bell within reach  - Keep bed low and locked with side rails adjusted as appropriate  - Keep  care items and personal belongings within reach  - Initiate and maintain comfort rounds  - Make Fall Risk Sign visible to staff  - Offer Toileting every 2 Hours, in advance of need  - Initiate/Maintain alarm  - Obtain necessary fall risk management equipment  - Apply yellow socks and bracelet for high fall risk patients  - Consider moving patient to room near nurses station  Outcome: Progressing  Goal: Maintain or return to baseline ADL function  Description: INTERVENTIONS:  -  Assess patient's ability to carry out ADLs; assess patient's baseline for ADL function and identify physical deficits which impact ability to perform ADLs (bathing, care of mouth/teeth, toileting, grooming, dressing, etc.)  - Assess/evaluate cause of self-care deficits   - Assess range of motion  - Assess patient's mobility; develop plan if impaired  - Assess patient's need for assistive devices and provide as appropriate  - Encourage maximum independence but intervene and supervise when necessary  - Involve family in performance of ADLs  - Assess for home care needs following discharge   - Consider OT consult to assist with ADL evaluation and planning for discharge  - Provide patient education as appropriate  - Monitor functional capacity and physical performance, use of AM PAC & JH-HLM   - Monitor gait, balance and fatigue with ambulation    Outcome: Progressing  Goal: Maintains/Returns to pre admission functional level  Description: INTERVENTIONS:  - Perform AM-PAC 6 Click Basic Mobility/ Daily Activity assessment daily.  - Set and communicate daily mobility goal to care team and patient/family/caregiver.   - Collaborate with rehabilitation services on mobility goals if consulted  - Perform Range of Motion -* times a day.  - Reposition patient every - hours.  - Dangle patient - times a day  - Stand patient - times a day  - Ambulate patient - times a day  - Out of bed to chair - times a day   - Out of bed for meals - times a day  - Out of  bed for toileting  - Record patient progress and toleration of activity level   Outcome: Progressing     Problem: DISCHARGE PLANNING  Goal: Discharge to home or other facility with appropriate resources  Description: INTERVENTIONS:  - Identify barriers to discharge w/patient and caregiver  - Arrange for needed discharge resources and transportation as appropriate  - Identify discharge learning needs (meds, wound care, etc.)  - Arrange for interpretive services to assist at discharge as needed  - Refer to Case Management Department for coordinating discharge planning if the patient needs post-hospital services based on physician/advanced practitioner order or complex needs related to functional status, cognitive ability, or social support system  Outcome: Progressing     Problem: Knowledge Deficit  Goal: Patient/family/caregiver demonstrates understanding of disease process, treatment plan, medications, and discharge instructions  Description: Complete learning assessment and assess knowledge base.  Interventions:  - Provide teaching at level of understanding  - Provide teaching via preferred learning methods  Outcome: Progressing     Problem: METABOLIC, FLUID AND ELECTROLYTES - ADULT  Goal: Electrolytes maintained within normal limits  Description: INTERVENTIONS:  - Monitor labs and assess patient for signs and symptoms of electrolyte imbalances  - Administer electrolyte replacement as ordered  - Monitor response to electrolyte replacements, including repeat lab results as appropriate  - Instruct patient on fluid and nutrition as appropriate  Outcome: Progressing  Goal: Fluid balance maintained  Description: INTERVENTIONS:  - Monitor labs   - Monitor I/O and WT  - Instruct patient on fluid and nutrition as appropriate  - Assess for signs & symptoms of volume excess or deficit  Outcome: Progressing  Goal: Glucose maintained within target range  Description: INTERVENTIONS:  - Monitor Blood Glucose as ordered  - Assess  for signs and symptoms of hyperglycemia and hypoglycemia  - Administer ordered medications to maintain glucose within target range  - Assess nutritional intake and initiate nutrition service referral as needed  Outcome: Progressing     Problem: Prexisting or High Potential for Compromised Skin Integrity  Goal: Skin integrity is maintained or improved  Description: INTERVENTIONS:  - Identify patients at risk for skin breakdown  - Assess and monitor skin integrity including under and around medical devices   - Assess and monitor nutrition and hydration status  - Monitor labs  - Assess for incontinence   - Turn and reposition patient  - Assist with mobility/ambulation  - Relieve pressure over mariano prominences   - Avoid friction and shearing  - Provide appropriate hygiene as needed including keeping skin clean and dry  - Evaluate need for skin moisturizer/barrier cream  - Collaborate with interdisciplinary team  - Patient/family teaching  - Consider wound care consult    Assess:  - Review Royce scale daily  - Clean and moisturize skin every -  - Inspect skin when repositioning, toileting, and assisting with ADLS  - Assess under medical devices such as - every -  - Assess extremities for adequate circulation and sensation     Bed Management:  - Have minimal linens on bed & keep smooth, unwrinkled  - Change linens as needed when moist or perspiring  - Avoid sitting or lying in one position for more than - hours while in bed  - Keep HOB at degrees   - Toileting:  - Offer bedside commode  - Assess for incontinence every -  - Use incontinent care products after each incontinent episode such as -    Activity:  - Mobilize patient - times a day  - Encourage activity and walks on unit  - Encourage or provide ROM exercises   - Turn and reposition patient every 2 Hours  - Use appropriate equipment to lift or move patient in bed  - Instruct/ Assist with weight shifting every - when out of bed in chair  - Consider limitation of  chair time - hour intervals    Skin Care:  - Avoid use of baby powder, tape, friction and shearing, hot water or constrictive clothing  - Relieve pressure over bony prominences using -  - Do not massage red bony areas    Next Steps:  - Teach patient strategies to minimize risks such as -  - Consider consults to  interdisciplinary teams such as -  Outcome: Progressing     Problem: Decreased Cardiac Output  Goal: Cardiac output adequate for individual needs  Description: INTERVENTIONS: Monitor for signs and symptoms of decreased cardiac output   - Monitor for dyspnea with exertion and at rest  - Monitor for orthopnea  - Monitor for signs of tachycardia. Place patient on telemetry monitoring.  - Assess patient for jugular vein distention  - Assess patient for lower extremity edema and poor peripheral perfusion   - Auscultate lung sound for Fine bibasilar crackles   - Monitor for cardiac arrythmias   - Administer beta blockers, antiarrhythmic, and blood pressure medications as ordered    Outcome: Progressing     Problem: Impaired Gas Exchange  Goal: Optimize oxygenation and ensure adequate ventilation  Description: INTERVENTIONS: Monitor for signs and symptoms of respiratory distress                - Elevate HOB or use high fowlers to promote lung expansion                - Administer oxygen as ordered to maintain adequate oxygenation                - Encourage use of IS to promote lung expansion and prevent PN                - Monitor ABGs to assess oxygenation status                - Monitor blood oxygen level to maintain adequate oxygenation                - Encourage cough and deep breathing exercises to promote lung expansion                - Monitor patient's mental status for increased confusion    Outcome: Progressing     Problem: Activity Intolerance  Goal: Patient is able to perform activities within their limitations  Description: INTERVENTIONS:                       -   Alternate periods of activity with  periods of rest                 -   Patients is able to maintain normal vitals heart rhythm during activity                 -   Gradually increase activity and exercise as patient can tolerate                 -   Monitor blood pressure and heart before and after exercise                  -   Monitor blood oxygen saturation during activity and apply oxygen as needed    Outcome: Progressing     Problem: Knowledge Deficit  Goal: Patient is able to verbalize understanding of Heart Failure after education  Description: INTERVENTIONS:  - Educate the patient and family on signs and symptoms of HF  - Provide the patient with HF education and HF zone tool  - Educate on the importance of daily weight in the AM and reporting a weight gain               of 3 or more pounds to their primary care physician  - Monitor for SOB  - Maintain and sodium and fluid restriction  - Educate the patient on the importance of medications such as: diuretics, betablockers,               antiarrhythmics and their purpose, dose, route, side effects and labs               if they are needed    Outcome: Progressing

## 2025-07-22 VITALS
OXYGEN SATURATION: 98 % | BODY MASS INDEX: 39.68 KG/M2 | RESPIRATION RATE: 18 BRPM | SYSTOLIC BLOOD PRESSURE: 107 MMHG | HEART RATE: 88 BPM | WEIGHT: 245.81 LBS | DIASTOLIC BLOOD PRESSURE: 63 MMHG | TEMPERATURE: 97 F

## 2025-07-22 PROBLEM — E87.1 HYPONATREMIA: Status: ACTIVE | Noted: 2025-07-22

## 2025-07-22 LAB
ALBUMIN SERPL BCG-MCNC: 3.3 G/DL (ref 3.5–5)
ALBUMIN SERPL BCG-MCNC: 3.5 G/DL (ref 3.5–5)
ALP SERPL-CCNC: 196 U/L (ref 34–104)
ALP SERPL-CCNC: 215 U/L (ref 34–104)
ALT SERPL W P-5'-P-CCNC: 22 U/L (ref 7–52)
ALT SERPL W P-5'-P-CCNC: 23 U/L (ref 7–52)
AMMONIA PLAS-SCNC: 38 UMOL/L (ref 18–72)
ANION GAP SERPL CALCULATED.3IONS-SCNC: 5 MMOL/L (ref 4–13)
ANION GAP SERPL CALCULATED.3IONS-SCNC: 7 MMOL/L (ref 4–13)
AST SERPL W P-5'-P-CCNC: 57 U/L (ref 13–39)
AST SERPL W P-5'-P-CCNC: 63 U/L (ref 13–39)
BASOPHILS # BLD AUTO: 0.04 THOUSANDS/ÂΜL (ref 0–0.1)
BASOPHILS NFR BLD AUTO: 1 % (ref 0–1)
BILIRUB SERPL-MCNC: 4.54 MG/DL (ref 0.2–1)
BILIRUB SERPL-MCNC: 5.24 MG/DL (ref 0.2–1)
BUN SERPL-MCNC: 15 MG/DL (ref 5–25)
BUN SERPL-MCNC: 15 MG/DL (ref 5–25)
CALCIUM ALBUM COR SERPL-MCNC: 9.2 MG/DL (ref 8.3–10.1)
CALCIUM SERPL-MCNC: 8.6 MG/DL (ref 8.4–10.2)
CALCIUM SERPL-MCNC: 9 MG/DL (ref 8.4–10.2)
CARDIAC TROPONIN I PNL SERPL HS: 10 NG/L (ref 8–18)
CHLORIDE SERPL-SCNC: 90 MMOL/L (ref 96–108)
CHLORIDE SERPL-SCNC: 91 MMOL/L (ref 96–108)
CO2 SERPL-SCNC: 34 MMOL/L (ref 21–32)
CO2 SERPL-SCNC: 34 MMOL/L (ref 21–32)
CREAT SERPL-MCNC: 1 MG/DL (ref 0.6–1.3)
CREAT SERPL-MCNC: 1.16 MG/DL (ref 0.6–1.3)
EOSINOPHIL # BLD AUTO: 0.13 THOUSAND/ÂΜL (ref 0–0.61)
EOSINOPHIL NFR BLD AUTO: 3 % (ref 0–6)
ERYTHROCYTE [DISTWIDTH] IN BLOOD BY AUTOMATED COUNT: 18 % (ref 11.6–15.1)
GFR SERPL CREATININE-BSD FRML MDRD: 53 ML/MIN/1.73SQ M
GFR SERPL CREATININE-BSD FRML MDRD: 63 ML/MIN/1.73SQ M
GLUCOSE SERPL-MCNC: 102 MG/DL (ref 65–140)
GLUCOSE SERPL-MCNC: 90 MG/DL (ref 65–140)
HCT VFR BLD AUTO: 25.4 % (ref 34.8–46.1)
HGB BLD-MCNC: 7.8 G/DL (ref 11.5–15.4)
IMM GRANULOCYTES # BLD AUTO: 0.02 THOUSAND/UL (ref 0–0.2)
IMM GRANULOCYTES NFR BLD AUTO: 0 % (ref 0–2)
INR PPP: 1.77 (ref 0.85–1.19)
LYMPHOCYTES # BLD AUTO: 0.84 THOUSANDS/ÂΜL (ref 0.6–4.47)
LYMPHOCYTES NFR BLD AUTO: 18 % (ref 14–44)
MAGNESIUM SERPL-MCNC: 2 MG/DL (ref 1.9–2.7)
MCH RBC QN AUTO: 30.4 PG (ref 26.8–34.3)
MCHC RBC AUTO-ENTMCNC: 30.7 G/DL (ref 31.4–37.4)
MCV RBC AUTO: 99 FL (ref 82–98)
MONOCYTES # BLD AUTO: 0.39 THOUSAND/ÂΜL (ref 0.17–1.22)
MONOCYTES NFR BLD AUTO: 9 % (ref 4–12)
NEUTROPHILS # BLD AUTO: 3.15 THOUSANDS/ÂΜL (ref 1.85–7.62)
NEUTS SEG NFR BLD AUTO: 69 % (ref 43–75)
NRBC BLD AUTO-RTO: 0 /100 WBCS
PLATELET # BLD AUTO: 70 THOUSANDS/UL (ref 149–390)
PMV BLD AUTO: 10.7 FL (ref 8.9–12.7)
POTASSIUM SERPL-SCNC: 3.1 MMOL/L (ref 3.5–5.3)
POTASSIUM SERPL-SCNC: 3.3 MMOL/L (ref 3.5–5.3)
PROT SERPL-MCNC: 6.4 G/DL (ref 6.4–8.4)
PROT SERPL-MCNC: 7.3 G/DL (ref 6.4–8.4)
PROTHROMBIN TIME: 21.4 SECONDS (ref 12.3–15)
RBC # BLD AUTO: 2.57 MILLION/UL (ref 3.81–5.12)
SODIUM SERPL-SCNC: 130 MMOL/L (ref 135–147)
SODIUM SERPL-SCNC: 131 MMOL/L (ref 135–147)
WBC # BLD AUTO: 4.57 THOUSAND/UL (ref 4.31–10.16)

## 2025-07-22 PROCEDURE — 85025 COMPLETE CBC W/AUTO DIFF WBC: CPT | Performed by: FAMILY MEDICINE

## 2025-07-22 PROCEDURE — 82140 ASSAY OF AMMONIA: CPT | Performed by: FAMILY MEDICINE

## 2025-07-22 PROCEDURE — 99239 HOSP IP/OBS DSCHRG MGMT >30: CPT | Performed by: FAMILY MEDICINE

## 2025-07-22 PROCEDURE — 80053 COMPREHEN METABOLIC PANEL: CPT | Performed by: FAMILY MEDICINE

## 2025-07-22 PROCEDURE — 83735 ASSAY OF MAGNESIUM: CPT | Performed by: FAMILY MEDICINE

## 2025-07-22 PROCEDURE — 99232 SBSQ HOSP IP/OBS MODERATE 35: CPT | Performed by: INTERNAL MEDICINE

## 2025-07-22 PROCEDURE — 84484 ASSAY OF TROPONIN QUANT: CPT | Performed by: FAMILY MEDICINE

## 2025-07-22 PROCEDURE — 85610 PROTHROMBIN TIME: CPT | Performed by: FAMILY MEDICINE

## 2025-07-22 RX ORDER — CARVEDILOL 3.12 MG/1
3.12 TABLET ORAL 2 TIMES DAILY WITH MEALS
Start: 2025-07-22

## 2025-07-22 RX ORDER — POTASSIUM CHLORIDE 1500 MG/1
20 TABLET, EXTENDED RELEASE ORAL ONCE
Status: COMPLETED | OUTPATIENT
Start: 2025-07-22 | End: 2025-07-22

## 2025-07-22 RX ORDER — SPIRONOLACTONE 25 MG/1
12.5 TABLET ORAL DAILY
Status: DISCONTINUED | OUTPATIENT
Start: 2025-07-22 | End: 2025-07-22 | Stop reason: HOSPADM

## 2025-07-22 RX ORDER — OXYCODONE HYDROCHLORIDE 10 MG/1
10 TABLET ORAL EVERY 6 HOURS PRN
Qty: 16 TABLET | Refills: 0 | Status: SHIPPED | OUTPATIENT
Start: 2025-07-22 | End: 2025-08-01

## 2025-07-22 RX ORDER — TORSEMIDE 20 MG/1
20 TABLET ORAL DAILY
Status: DISCONTINUED | OUTPATIENT
Start: 2025-07-22 | End: 2025-07-22 | Stop reason: HOSPADM

## 2025-07-22 RX ORDER — POTASSIUM CHLORIDE 1500 MG/1
40 TABLET, EXTENDED RELEASE ORAL ONCE
Status: COMPLETED | OUTPATIENT
Start: 2025-07-22 | End: 2025-07-22

## 2025-07-22 RX ADMIN — PANTOPRAZOLE SODIUM 40 MG: 40 TABLET, DELAYED RELEASE ORAL at 05:18

## 2025-07-22 RX ADMIN — FOLIC ACID 1 MG: 1 TABLET ORAL at 10:21

## 2025-07-22 RX ADMIN — LACTULOSE 30 G: 10 SOLUTION ORAL at 16:23

## 2025-07-22 RX ADMIN — RIFAXIMIN 550 MG: 550 TABLET ORAL at 10:21

## 2025-07-22 RX ADMIN — POTASSIUM CHLORIDE 40 MEQ: 1500 TABLET, EXTENDED RELEASE ORAL at 05:18

## 2025-07-22 RX ADMIN — HEPARIN SODIUM 5000 UNITS: 5000 INJECTION INTRAVENOUS; SUBCUTANEOUS at 13:00

## 2025-07-22 RX ADMIN — POLYETHYLENE GLYCOL 3350 17 G: 17 POWDER, FOR SOLUTION ORAL at 10:21

## 2025-07-22 RX ADMIN — URSODIOL 300 MG: 300 CAPSULE ORAL at 16:23

## 2025-07-22 RX ADMIN — LIDOCAINE 5% 1 PATCH: 700 PATCH TOPICAL at 10:21

## 2025-07-22 RX ADMIN — HEPARIN SODIUM 5000 UNITS: 5000 INJECTION INTRAVENOUS; SUBCUTANEOUS at 05:18

## 2025-07-22 RX ADMIN — CARVEDILOL 3.12 MG: 3.12 TABLET, FILM COATED ORAL at 10:21

## 2025-07-22 RX ADMIN — LACTULOSE 30 G: 10 SOLUTION ORAL at 13:00

## 2025-07-22 RX ADMIN — SPIRONOLACTONE 12.5 MG: 25 TABLET ORAL at 13:00

## 2025-07-22 RX ADMIN — LACTULOSE 30 G: 10 SOLUTION ORAL at 10:21

## 2025-07-22 RX ADMIN — TORSEMIDE 20 MG: 20 TABLET ORAL at 10:21

## 2025-07-22 RX ADMIN — OXYCODONE HYDROCHLORIDE 10 MG: 10 TABLET ORAL at 08:14

## 2025-07-22 RX ADMIN — DAPSONE 50 MG: 25 TABLET ORAL at 10:21

## 2025-07-22 RX ADMIN — POTASSIUM CHLORIDE 20 MEQ: 1500 TABLET, EXTENDED RELEASE ORAL at 10:21

## 2025-07-22 RX ADMIN — URSODIOL 300 MG: 300 CAPSULE ORAL at 10:29

## 2025-07-22 NOTE — ASSESSMENT & PLAN NOTE
Potassium is 3.1 with a magnesium of 2.  Will replace potassium and will start torsemide and Aldactone this morning.    Administrative Statements   VIRTUAL CARE DOCUMENTATION:     1. This service was provided via Telemedicine using [employer]     2. Parties in the room with patient during teleconsult Patient only    3. Confidentiality My office door was closed     4. Participants No one else was in the room    5. Patient acknowledged consent and understanding of privacy and security of the  Telemedicine consult. I informed the patient that I have reviewed their record in Epic and presented the opportunity for them to ask any questions regarding the visit today.  The patient agreed to participate.    6. I have spent a total time of approximately 25 minutes in caring for this patient on the day of the visit/encounter including documentation of medical decision making, review of patient's chart, interview with the patient, modification of assessment and plan, order placement, patient visit not including the time spent for establishing the audio/video connection.

## 2025-07-22 NOTE — PLAN OF CARE
Problem: PAIN - ADULT  Goal: Verbalizes/displays adequate comfort level or baseline comfort level  Description: Interventions:  - Encourage patient to monitor pain and request assistance  - Assess pain using appropriate pain scale  - Administer analgesics as ordered based on type and severity of pain and evaluate response  - Implement non-pharmacological measures as appropriate and evaluate response  - Consider cultural and social influences on pain and pain management  - Notify physician/advanced practitioner if interventions unsuccessful or patient reports new pain  - Educate patient/family on pain management process including their role and importance of  reporting pain   - Provide non-pharmacologic/complimentary pain relief interventions  Outcome: Progressing     Problem: INFECTION - ADULT  Goal: Absence or prevention of progression during hospitalization  Description: INTERVENTIONS:  - Assess and monitor for signs and symptoms of infection  - Monitor lab/diagnostic results  - Monitor all insertion sites, i.e. indwelling lines, tubes, and drains  - Monitor endotracheal if appropriate and nasal secretions for changes in amount and color  - [place] appropriate cooling/warming therapies per order  - Administer medications as ordered  - Instruct and encourage patient and family to use good hand hygiene technique  - Identify and instruct in appropriate isolation precautions for identified infection/condition  Outcome: Progressing     Problem: SAFETY ADULT  Goal: Patient will remain free of falls  Description: INTERVENTIONS:  - Educate patient/family on patient safety including physical limitations  - Instruct patient to call for assistance with activity   - Consider consulting OT/PT to assist with strengthening/mobility based on AM PAC & JH-HLM score  - Consult OT/PT to assist with strengthening/mobility   - Keep Call bell within reach  - Keep bed low and locked with side rails adjusted as appropriate  - Keep  care items and personal belongings within reach  - Initiate and maintain comfort rounds  - Make Fall Risk Sign visible to staff  - Offer Toileting every  Hours, in advance of need  - Initiate/Maintain alarm  - Obtain necessary fall risk management equipment:   - Apply yellow socks and bracelet for high fall risk patients  - Consider moving patient to room near nurses station  Outcome: Progressing  Goal: Maintain or return to baseline ADL function  Description: INTERVENTIONS:  -  Assess patient's ability to carry out ADLs; assess patient's baseline for ADL function and identify physical deficits which impact ability to perform ADLs (bathing, care of mouth/teeth, toileting, grooming, dressing, etc.)  - Assess/evaluate cause of self-care deficits   - Assess range of motion  - Assess patient's mobility; develop plan if impaired  - Assess patient's need for assistive devices and provide as appropriate  - Encourage maximum independence but intervene and supervise when necessary  - Involve family in performance of ADLs  - Assess for home care needs following discharge   - Consider OT consult to assist with ADL evaluation and planning for discharge  - Provide patient education as appropriate  - Monitor functional capacity and physical performance, use of AM PAC & JH-HLM   - Monitor gait, balance and fatigue with ambulation    Outcome: Progressing  Goal: Maintains/Returns to pre admission functional level  Description: INTERVENTIONS:  - Perform AM-PAC 6 Click Basic Mobility/ Daily Activity assessment daily.  - Set and communicate daily mobility goal to care team and patient/family/caregiver.   - Collaborate with rehabilitation services on mobility goals if consulted  - Perform Range of Motion  times a day.  - Reposition patient every  hours.  - Dangle patient  times a day  - Stand patient  times a day  - Ambulate patient  times a day  - Out of bed to chair  times a day   - Out of bed for meals  times a day  - Out of bed for  toileting  - Record patient progress and toleration of activity level   Outcome: Progressing     Problem: DISCHARGE PLANNING  Goal: Discharge to home or other facility with appropriate resources  Description: INTERVENTIONS:  - Identify barriers to discharge w/patient and caregiver  - Arrange for needed discharge resources and transportation as appropriate  - Identify discharge learning needs (meds, wound care, etc.)  - Arrange for interpretive services to assist at discharge as needed  - Refer to Case Management Department for coordinating discharge planning if the patient needs post-hospital services based on physician/advanced practitioner order or complex needs related to functional status, cognitive ability, or social support system  Outcome: Progressing     Problem: Knowledge Deficit  Goal: Patient/family/caregiver demonstrates understanding of disease process, treatment plan, medications, and discharge instructions  Description: Complete learning assessment and assess knowledge base.  Interventions:  - Provide teaching at level of understanding  - Provide teaching via preferred learning methods  Outcome: Progressing     Problem: METABOLIC, FLUID AND ELECTROLYTES - ADULT  Goal: Electrolytes maintained within normal limits  Description: INTERVENTIONS:  - Monitor labs and assess patient for signs and symptoms of electrolyte imbalances  - Administer electrolyte replacement as ordered  - Monitor response to electrolyte replacements, including repeat lab results as appropriate  - Instruct patient on fluid and nutrition as appropriate  Outcome: Progressing  Goal: Fluid balance maintained  Description: INTERVENTIONS:  - Monitor labs   - Monitor I/O and WT  - Instruct patient on fluid and nutrition as appropriate  - Assess for signs & symptoms of volume excess or deficit  Outcome: Progressing  Goal: Glucose maintained within target range  Description: INTERVENTIONS:  - Monitor Blood Glucose as ordered  - Assess for  signs and symptoms of hyperglycemia and hypoglycemia  - Administer ordered medications to maintain glucose within target range  - Assess nutritional intake and initiate nutrition service referral as needed  Outcome: Progressing     Problem: Prexisting or High Potential for Compromised Skin Integrity  Goal: Skin integrity is maintained or improved  Description: INTERVENTIONS:  - Identify patients at risk for skin breakdown  - Assess and monitor skin integrity including under and around medical devices   - Assess and monitor nutrition and hydration status  - Monitor labs  - Assess for incontinence   - Turn and reposition patient  - Assist with mobility/ambulation  - Relieve pressure over mariano prominences   - Avoid friction and shearing  - Provide appropriate hygiene as needed including keeping skin clean and dry  - Evaluate need for skin moisturizer/barrier cream  - Collaborate with interdisciplinary team  - Patient/family teaching  - Consider wound care consult    Assess:  - Review Royce scale daily  - Clean and moisturize skin every   - Inspect skin when repositioning, toileting, and assisting with ADLS  - Assess under medical devices such as  every   - Assess extremities for adequate circulation and sensation     Bed Management:  - Have minimal linens on bed & keep smooth, unwrinkled  - Change linens as needed when moist or perspiring  - Avoid sitting or lying in one position for more than  hours while in bed?Keep HOB at degrees   - Toileting:  - Offer bedside commode  - Assess for incontinence every   - Use incontinent care products after each incontinent episode such as     Activity:  - Mobilize patient  times a day  - Encourage activity and walks on unit  - Encourage or provide ROM exercises   - Turn and reposition patient every Hours  - Use appropriate equipment to lift or move patient in bed  - Instruct/ Assist with weight shifting every when out of bed in chair  - Consider limitation of chair time  hour  intervals    Skin Care:  - Avoid use of baby powder, tape, friction and shearing, hot water or constrictive clothing  - Relieve pressure over bony prominences using   - Do not massage red bony areas    Next Steps:  - Teach patient strategies to minimize risks such as   - Consider consults to  interdisciplinary teams such as   Outcome: Progressing     Problem: Decreased Cardiac Output  Goal: Cardiac output adequate for individual needs  Description: INTERVENTIONS: Monitor for signs and symptoms of decreased cardiac output   - Monitor for dyspnea with exertion and at rest  - Monitor for orthopnea  - Monitor for signs of tachycardia. Place patient on telemetry monitoring.  - Assess patient for jugular vein distention  - Assess patient for lower extremity edema and poor peripheral perfusion   - Auscultate lung sound for Fine bibasilar crackles   - Monitor for cardiac arrythmias   - Administer beta blockers, antiarrhythmic, and blood pressure medications as ordered    Outcome: Progressing     Problem: Impaired Gas Exchange  Goal: Optimize oxygenation and ensure adequate ventilation  Description: INTERVENTIONS: Monitor for signs and symptoms of respiratory distress                - Elevate HOB or use high fowlers to promote lung expansion                - Administer oxygen as ordered to maintain adequate oxygenation                - Encourage use of IS to promote lung expansion and prevent PN                - Monitor ABGs to assess oxygenation status                - Monitor blood oxygen level to maintain adequate oxygenation                - Encourage cough and deep breathing exercises to promote lung expansion                - Monitor patient's mental status for increased confusion    Outcome: Progressing     Problem: Excess Fluid Volume  Goal: Patient is able to achieve and maintain homeostasis  Description: INTERVENTIONS: Monitor for sign and symptoms of fluid overload  - Evaluate LE edema every shift  - Elevate LE to  prevent dependent edema  - Apply BRIAN stockings as ordered   - Monitor ankle circumference daily  - Assess for jugular vein distention  - Evaluate provider orders for the CHF diuretic algorithm. Administer diuretics as ordered  - Weigh the patient daily at 0600 and report a weight gain of five pounds or more   - Strict intake and output  - Monitor fluid intake and adhere to fluid restrictions  - Assess lung sounds every shift and as needed  - Monitor vital signs and lab values (CBC, chem, BUN, BNP)  - Measure and document urine output    Outcome: Progressing     Problem: Activity Intolerance  Goal: Patient is able to perform activities within their limitations  Description: INTERVENTIONS:                       -   Alternate periods of activity with periods of rest                 -   Patients is able to maintain normal vitals heart rhythm during activity                 -   Gradually increase activity and exercise as patient can tolerate                 -   Monitor blood pressure and heart before and after exercise                  -   Monitor blood oxygen saturation during activity and apply oxygen as needed    Outcome: Progressing     Problem: Knowledge Deficit  Goal: Patient is able to verbalize understanding of Heart Failure after education  Description: INTERVENTIONS:  - Educate the patient and family on signs and symptoms of HF  - Provide the patient with HF education and HF zone tool  - Educate on the importance of daily weight in the AM and reporting a weight gain               of 3 or more pounds to their primary care physician  - Monitor for SOB  - Maintain and sodium and fluid restriction  - Educate the patient on the importance of medications such as: diuretics, betablockers,               antiarrhythmics and their purpose, dose, route, side effects and labs               if they are needed    Outcome: Progressing

## 2025-07-22 NOTE — CASE MANAGEMENT
Case Management Discharge Planning Note    Patient name Bailey Olsen  Location /-01 MRN [id number]  : 1970 Date 2025       Current Admission Date: 2025  Current Admission Diagnosis:Hypokalemia   Patient Active Problem List    Diagnosis Date Noted    Hyponatremia 2025    Acute renal failure with tubular necrosis (HCC) 2025    ATN (acute tubular necrosis) (HCC) 2025    Closed fracture of phalanx of right middle finger 2025    Closed fracture of twelfth thoracic vertebra (HCC) 2025    Stage 3a chronic kidney disease (HCC) 2025    Scalp hematoma 2025    Contusion of back 2025    Ecchymosis 2025    Falls 2025    Chest pain 2025    Abdominal pain 2025    Acute kidney injury (HCC) 2025    Gram-negative bacteremia 2025    Acute respiratory failure ([facility]) 2025    Carcinoid tumor of pancreas 2025    Hypomagnesemia 2025    Upper GI bleed 2025    Acute anemia 2025    Rectal bleeding 2025    Acute blood loss anemia 2025    Chronic, continuous use of opioids 2025    Acute metabolic encephalopathy 2025    SIRS (systemic inflammatory response syndrome) ([facility]) 2025    Sacral fracture, closed ([facility]) 11/15/2024    KISHORE (acute kidney injury) ([facility]) 10/04/2024    Morbid obesity with BMI of 40.0-44.9, adult ([facility]) 10/04/2024    Pathological fracture of vertebra due to secondary osteoporosis ([facility]) 2024    Fall from standing 2024    Closed compression fracture of L1 lumbar vertebra, sequela 2024    Closed stable burst fracture of first lumbar vertebra with routine healing 2024    Groin hematoma 2024    Ambulatory dysfunction 2024    Intractable back pain 2024    Fall 2024    Liver cirrhosis secondary to HYATT (nonalcoholic steatohepatitis) ([facility]) 2024    History of malignant carcinoid tumor of small intestine  05/26/2024    Class 3 severe obesity due to excess calories in adult 05/26/2024    Primary hypertension 05/26/2024    Hypokalemia 05/26/2024    Fibromyalgia 05/26/2024    Iron deficiency anemia 05/26/2024    Pancytopenia (HCC) 05/26/2024    Sarcoidosis 05/26/2024    Bilateral pulmonary infiltrates 05/26/2024      LOS (days): 6  Geometric Mean LOS (GMLOS) (days): 4.6  Days to GMLOS:-1.4     OBJECTIVE:  Risk of Unplanned Readmission Score: 64.42         Current admission status: Inpatient   Preferred Pharmacy:   [id number] - [place] - [address] [facility]  [address] [address]  Phone: [phone] Fax: [phone]    [id number] - [place], [place] - [address] [facility]  [address] [place] [address]  Phone: [phone] Fax: [phone]    Primary Care Provider: Nayely Brown DO    Primary Insurance: MEDICARE  Secondary Insurance: BLUE CROSS    DISCHARGE DETAILS:                         CM made facility aware of transport time for patient to [facility]                                    Transport at Discharge : [employer] van        Transported by (Company and Unit #): [facility]  ETA of Transport (Date): 07/22/25  ETA of Transport (Time): 1730              IMM Given (Date):: 07/22/25  IMM Given to:: Family  Family notified:: appeal rights reviewed with patient and spouse

## 2025-07-22 NOTE — CASE MANAGEMENT
Case Management Discharge Planning Note    Patient name Bailey Olsen  Location /-01 MRN [id number]  : 1970 Date 2025       Current Admission Date: 2025  Current Admission Diagnosis:Hypokalemia   Patient Active Problem List    Diagnosis Date Noted    Hyponatremia 2025    Acute renal failure with tubular necrosis (HCC) 2025    ATN (acute tubular necrosis) (HCC) 2025    Closed fracture of phalanx of right middle finger 2025    Closed fracture of twelfth thoracic vertebra (HCC) 2025    Stage 3a chronic kidney disease (HCC) 2025    Scalp hematoma 2025    Contusion of back 2025    Ecchymosis 2025    Falls 2025    Chest pain 2025    Abdominal pain 2025    Acute kidney injury (HCC) 2025    Gram-negative bacteremia 2025    Acute respiratory failure ([facility]) 2025    Carcinoid tumor of pancreas 2025    Hypomagnesemia 2025    Upper GI bleed 2025    Acute anemia 2025    Rectal bleeding 2025    Acute blood loss anemia 2025    Chronic, continuous use of opioids 2025    Acute metabolic encephalopathy 2025    SIRS (systemic inflammatory response syndrome) ([facility]) 2025    Sacral fracture, closed ([facility]) 11/15/2024    KISHORE (acute kidney injury) ([facility]) 10/04/2024    Morbid obesity with BMI of 40.0-44.9, adult ([facility]) 10/04/2024    Pathological fracture of vertebra due to secondary osteoporosis ([facility]) 2024    Fall from standing 2024    Closed compression fracture of L1 lumbar vertebra, sequela 2024    Closed stable burst fracture of first lumbar vertebra with routine healing 2024    Groin hematoma 2024    Ambulatory dysfunction 2024    Intractable back pain 2024    Fall 2024    Liver cirrhosis secondary to HYATT (nonalcoholic steatohepatitis) ([facility]) 2024    History of malignant carcinoid tumor of small intestine  05/26/2024    Class 3 severe obesity due to excess calories in adult 05/26/2024    Primary hypertension 05/26/2024    Hypokalemia 05/26/2024    Fibromyalgia 05/26/2024    Iron deficiency anemia 05/26/2024    Pancytopenia (HCC) 05/26/2024    Sarcoidosis 05/26/2024    Bilateral pulmonary infiltrates 05/26/2024      LOS (days): 6  Geometric Mean LOS (GMLOS) (days): 4.6  Days to GMLOS:-1.4     OBJECTIVE:  Risk of Unplanned Readmission Score: 64.42         Current admission status: Inpatient   Preferred Pharmacy:   [facility] [address] [place] [address] - [address] [facility]  [address] [facility] [address]  Phone: [phone] Fax: [phone]    [id number] - [place], [place] - [address] [facility]  [address] [place] [address]  Phone: [phone] Fax: [phone]    Primary Care Provider: Nayely Brown DO    Primary Insurance: MEDICARE  Secondary Insurance: BLUE CROSS    DISCHARGE DETAILS:        CM met with patient to review discharge today to [facility]. Patient agreeable.    CM contacted spouse to discuss discharge today to [facility].    CM spoke to spouse, reviewed DC IMM and informed that patient can stay an additional 4 hours for reconsidering appealing the discharge as the medicare rights were review on the day of discharge. Spouse verbalized understanding and feels patient ready to go to [facility] and does not intend to stay 4 hours to reconsider. IMM placed in bin for filing.     CM discussed transport with spouse and spouse aware patient will be billed for transport to [facility].    CM submitted Release Of Information for filing so patients medical records can be sent to her sister, as requested, on release.     CM submitted Roundtrip referral for S transport of patient.                                                        Transport at Discharge : Jose almanza                             IMM Given (Date):: 07/22/25  IMM Given to:: Family  Family  notified:: appeal rights reviewed with patient and spouse       Accepting Facility Name, City & State : [facility]  Receiving Facility/Agency Phone Number: [phone]  Facility/Agency Fax Number: [phone]

## 2025-07-22 NOTE — NURSING NOTE
"Patient called RN to the room complaining of \"squeezing chest pain\". Provider notified; EKG and lab work ordered and collected. Patient repositioned and comforted.   "

## 2025-07-22 NOTE — PROGRESS NOTES
NEPHROLOGY HOSPITAL PROGRESS NOTE   Bailey Olsen 54 y.o. female MRN: [id number]  Unit/Bed#: -01 Encounter: [id number]    Assessment & Plan  Acute renal failure with tubular necrosis (HCC)  Baseline creatinine 0.9-1.1 mg/dL     Oliguric ATN KISHORE (contrast, Toradol, hypotension, lisinopril, spironolactone) versus HRS at the beginning of this admission.  The patient has status post Lasix stress test with robust urine output 5.4 L in 24 hours.  Lasix infusion discontinued July 19.  Diuretics have remained on hold.    Regarding plan of care today on July 22: The patient's creatinine is at her baseline at 1.  The patient is noted to have hypokalemia and hyponatremia.  Will replace potassium.  Will restart Demadex today as well as Aldactone.  I would not restart lisinopril as I think this just increases the risk of KISHORE for this patient.  Hemoglobin level is at 7.8 continue to monitor as it had been in the low to need regular the past 4 days.  Restarting diuretics including Aldactone and torsemide also will check orthostatic vital signs.  ATN (acute tubular necrosis) (HCC)  KISHORE due to ATN as above and this has resolved.    Liver cirrhosis secondary to HYATT (nonalcoholic steatohepatitis) (HCC)  Restarting home diuretics today  Acute metabolic encephalopathy  This seems to have improved.  Appreciate hospitalist service who had checked ammonia level.  Hypokalemia  Potassium is 3.1 with a magnesium of 2.  Will replace potassium and will start torsemide and Aldactone this morning.    Administrative Statements   VIRTUAL CARE DOCUMENTATION:     1. This service was provided via Telemedicine using [employer]     2. Parties in the room with patient during teleconsult Patient only    3. Confidentiality My office door was closed     4. Participants No one else was in the room    5. Patient acknowledged consent and understanding of privacy and security of the  Telemedicine consult. I informed the patient that I have reviewed  their record in Epic and presented the opportunity for them to ask any questions regarding the visit today.  The patient agreed to participate.    6. I have spent a total time of approximately 25 minutes in caring for this patient on the day of the visit/encounter including documentation of medical decision making, review of patient's chart, interview with the patient, modification of assessment and plan, order placement, patient visit not including the time spent for establishing the audio/video connection.      Hyponatremia  The sodium level was decreased down to 130.  Likely secondary to increased ADH in the setting of cirrhosis plus or minus increase free water intake.  Will modify fluid restriction of 1.5 L daily, restart torsemide 20 mg daily which patient was taking at home, also replace potassium which will help normalization of sodium levels.  Recheck BMP this afternoon.      SUBJECTIVE / 24H INTERVAL HISTORY:  Ms. Olsen is seen in follow-up this morning for chronic kidney disease and fluid management.  When I spoke to the patient's morning she was resting comfortably.  She seemed to answer more appropriate for me this morning.  She denied any chest pressure or shortness of breath.  Chart notes reviewed.    Regarding hemodynamics, systolic blood pressure has been anywhere from 102-119 systolic pulse oximetry is 96%.    OBJECTIVE:  Current Weight: Weight - Scale: 112 kg (245 lb 13 oz)  Vitals:    07/21/25 2139 07/21/25 2300 07/22/25 0713 07/22/25 0714   BP: 102/59  116/71 116/71   Pulse: 86  89 87   Resp: 20  18 18   Temp: (!) 97.2 °F (36.2 °C)  (!) 97 °F (36.1 °C) (!) 97 °F (36.1 °C)   TempSrc:       SpO2: (!) 88% 96% 95% 96%   Weight:           Intake/Output Summary (Last 24 hours) at 7/22/2025 0715  Last data filed at 7/21/2025 2152  Gross per 24 hour   Intake 340 ml   Output --   Net 340 ml     General: The patient is resting comfortably no acute distress.  HEENT: Normocephalic atraumatic  Neck: Appears  "to be supple  Pulmonary: No accessory muscle use/no conversational dyspnea  Cardiac: Pulse rates have been in the 80s  Neurologic: The patient was able to answer questions more focused this morning she did not seem to have the tangential thinking and mild confusion that she demonstrated yesterday on July 21.  Medications:  Current Medications[1]    Laboratory Results:  Results from last 7 days   Lab Units 07/22/25  0410 07/22/25  0355 07/21/25  0503 07/20/25  0558 07/19/25  0601 07/18/25  2020 07/18/25  0834 07/18/25  0522 07/17/25  1646 07/17/25  0610 07/17/25  0542 07/16/25  1825 07/16/25  1009   WBC Thousand/uL 4.57  --  3.70* 4.08* 3.16*  --   --  2.25*  --  3.11*  --   --  3.05*   HEMOGLOBIN g/dL 7.8*  --  8.3* 8.8* 9.0*  --   --  8.3*  --  8.2*  --   --  8.6*   HEMATOCRIT % 25.4*  --  26.8* 28.9* 28.7*  --   --  27.0*  --  26.0*  --   --  27.5*   PLATELETS Thousands/uL 70*  --  71* 78* 72*  --   --  77*  --  65*  --   --  68*   POTASSIUM mmol/L  --  3.1* 3.2* 3.5 3.3* 3.1* 3.9 3.8   < >  --  3.6   < > 2.1*   CHLORIDE mmol/L  --  91* 92* 94* 94* 95* 94* 95*   < >  --  94*   < > 89*   CO2 mmol/L  --  34* 38* 37* 41* 35* 36* 31   < >  --  33*   < > 42*   BUN mg/dL  --  15 12 13 15 17 18 19   < >  --  14   < > 10   CREATININE mg/dL  --  1.00 0.96 1.11 1.54* 1.82* 2.48* 2.62*   < >  --  1.76*   < > 0.98   CALCIUM mg/dL  --  8.6 8.6 8.6 8.9 9.2 9.3 8.7   < >  --  7.6*   < > 7.4*   MAGNESIUM mg/dL  --  2.0 1.7* 1.7* 1.5*  --   --   --   --   --  1.8*  --   --     < > = values in this interval not displayed.       Portions of the record may have been created with voice recognition software. Occasional wrong word or \"sound a like\" substitutions may have occurred due to the inherent limitations of voice recognition software. Read the chart carefully and recognize, using context, where substitutions have occurred.If you have any questions, please contact the dictating provider.          [1]   Current " Facility-Administered Medications:     amitriptyline (ELAVIL) tablet 50 mg, 50 mg, Oral, HS, Ruslan Keyes MD, 50 mg at 07/21/25 2147    carvedilol (COREG) tablet 3.125 mg, 3.125 mg, Oral, BID With Meals, Ruslan Keyes MD, 3.125 mg at 07/21/25 1753    [Held by provider] cyclobenzaprine (FLEXERIL) tablet 10 mg, 10 mg, Oral, TID PRN, Ruslan Keyes MD, 10 mg at 07/18/25 1535    dapsone tablet 50 mg, 50 mg, Oral, Daily, Ruslan Keyes MD, 50 mg at 07/21/25 0901    folic acid (FOLVITE) tablet 1 mg, 1 mg, Oral, Daily, Ruslan Keyes MD, 1 mg at 07/21/25 0900    heparin (porcine) subcutaneous injection 5,000 Units, 5,000 Units, Subcutaneous, Q8H MIGUEL, Ruslan Keyes MD, 5,000 Units at 07/22/25 0518    lactulose (CHRONULAC) oral solution 30 g, 30 g, Oral, 4x Daily, Ruslan Keyes MD, 30 g at 07/21/25 2149    lidocaine (LIDODERM) 5 % patch 1 patch, 1 patch, Topical, Daily, Ruslan Keyes MD, 1 patch at 07/21/25 0900    oxyCODONE (ROXICODONE) immediate release tablet 10 mg, 10 mg, Oral, Q6H PRN, Ruslan Keyes MD, 10 mg at 07/21/25 0502    pantoprazole (PROTONIX) EC tablet 40 mg, 40 mg, Oral, Early Morning, Ruslan Keyes MD, 40 mg at 07/22/25 0518    polyethylene glycol (MIRALAX) packet 17 g, 17 g, Oral, Daily, Ruslan Keyes MD, 17 g at 07/21/25 0859    rifaximin (XIFAXAN) tablet 550 mg, 550 mg, Oral, Q12H MIGUEL, Ruslan Keyes MD, 550 mg at 07/21/25 2147    trimethobenzamide (TIGAN) IM injection 200 mg, 200 mg, Intramuscular, Q6H PRN, Ruslan Keyes MD, 200 mg at 07/19/25 3117    ursodiol (ACTIGALL) capsule 300 mg, 300 mg, Oral, BID, Ruslan Keyes MD, 300 mg at 07/21/25 7493

## 2025-07-22 NOTE — PLAN OF CARE
Problem: PAIN - ADULT  Goal: Verbalizes/displays adequate comfort level or baseline comfort level  Description: Interventions:  - Encourage patient to monitor pain and request assistance  - Assess pain using appropriate pain scale  - Administer analgesics as ordered based on type and severity of pain and evaluate response  - Implement non-pharmacological measures as appropriate and evaluate response  - Consider cultural and social influences on pain and pain management  - Notify physician/advanced practitioner if interventions unsuccessful or patient reports new pain  - Educate patient/family on pain management process including their role and importance of  reporting pain   - Provide non-pharmacologic/complimentary pain relief interventions  Outcome: Progressing     Problem: INFECTION - ADULT  Goal: Absence or prevention of progression during hospitalization  Description: INTERVENTIONS:  - Assess and monitor for signs and symptoms of infection  - Monitor lab/diagnostic results  - Monitor all insertion sites, i.e. indwelling lines, tubes, and drains  - Monitor endotracheal if appropriate and nasal secretions for changes in amount and color  - [place] appropriate cooling/warming therapies per order  - Administer medications as ordered  - Instruct and encourage patient and family to use good hand hygiene technique  - Identify and instruct in appropriate isolation precautions for identified infection/condition  Outcome: Progressing     Problem: SAFETY ADULT  Goal: Patient will remain free of falls  Description: INTERVENTIONS:  - Educate patient/family on patient safety including physical limitations  - Instruct patient to call for assistance with activity   - Consider consulting OT/PT to assist with strengthening/mobility based on AM PAC & JH-HLM score  - Consult OT/PT to assist with strengthening/mobility   - Keep Call bell within reach  - Keep bed low and locked with side rails adjusted as appropriate  - Keep  care items and personal belongings within reach  - Initiate and maintain comfort rounds  - Make Fall Risk Sign visible to staff  - Offer Toileting every 2 Hours, in advance of need  - Initiate/Maintain bed alarm  - Obtain necessary fall risk management equipment: non-skid socks  - Apply yellow socks and bracelet for high fall risk patients  - Consider moving patient to room near nurses station  Outcome: Progressing  Goal: Maintain or return to baseline ADL function  Description: INTERVENTIONS:  -  Assess patient's ability to carry out ADLs; assess patient's baseline for ADL function and identify physical deficits which impact ability to perform ADLs (bathing, care of mouth/teeth, toileting, grooming, dressing, etc.)  - Assess/evaluate cause of self-care deficits   - Assess range of motion  - Assess patient's mobility; develop plan if impaired  - Assess patient's need for assistive devices and provide as appropriate  - Encourage maximum independence but intervene and supervise when necessary  - Involve family in performance of ADLs  - Assess for home care needs following discharge   - Consider OT consult to assist with ADL evaluation and planning for discharge  - Provide patient education as appropriate  - Monitor functional capacity and physical performance, use of AM PAC & -HLM   - Monitor gait, balance and fatigue with ambulation    Outcome: Progressing  Goal: Maintains/Returns to pre admission functional level  Description: INTERVENTIONS:  - Perform AM-PAC 6 Click Basic Mobility/ Daily Activity assessment daily.  - Set and communicate daily mobility goal to care team and patient/family/caregiver.   - Collaborate with rehabilitation services on mobility goals if consulted  - Perform Range of Motion 3 times a day.  - Reposition patient every 2 hours.  - Dangle patient 3 times a day  - Stand patient 3 times a day  - Ambulate patient 3 times a day  - Out of bed to chair 3 times a day   - Out of bed for meals 3  times a day  - Out of bed for toileting  - Record patient progress and toleration of activity level   Outcome: Progressing     Problem: DISCHARGE PLANNING  Goal: Discharge to home or other facility with appropriate resources  Description: INTERVENTIONS:  - Identify barriers to discharge w/patient and caregiver  - Arrange for needed discharge resources and transportation as appropriate  - Identify discharge learning needs (meds, wound care, etc.)  - Arrange for interpretive services to assist at discharge as needed  - Refer to Case Management Department for coordinating discharge planning if the patient needs post-hospital services based on physician/advanced practitioner order or complex needs related to functional status, cognitive ability, or social support system  Outcome: Progressing     Problem: Knowledge Deficit  Goal: Patient/family/caregiver demonstrates understanding of disease process, treatment plan, medications, and discharge instructions  Description: Complete learning assessment and assess knowledge base.  Interventions:  - Provide teaching at level of understanding  - Provide teaching via preferred learning methods  Outcome: Progressing     Problem: METABOLIC, FLUID AND ELECTROLYTES - ADULT  Goal: Electrolytes maintained within normal limits  Description: INTERVENTIONS:  - Monitor labs and assess patient for signs and symptoms of electrolyte imbalances  - Administer electrolyte replacement as ordered  - Monitor response to electrolyte replacements, including repeat lab results as appropriate  - Instruct patient on fluid and nutrition as appropriate  Outcome: Progressing  Goal: Fluid balance maintained  Description: INTERVENTIONS:  - Monitor labs   - Monitor I/O and WT  - Instruct patient on fluid and nutrition as appropriate  - Assess for signs & symptoms of volume excess or deficit  Outcome: Progressing  Goal: Glucose maintained within target range  Description: INTERVENTIONS:  - Monitor Blood  Glucose as ordered  - Assess for signs and symptoms of hyperglycemia and hypoglycemia  - Administer ordered medications to maintain glucose within target range  - Assess nutritional intake and initiate nutrition service referral as needed  Outcome: Progressing     Problem: Prexisting or High Potential for Compromised Skin Integrity  Goal: Skin integrity is maintained or improved  Description: INTERVENTIONS:  - Identify patients at risk for skin breakdown  - Assess and monitor skin integrity including under and around medical devices   - Assess and monitor nutrition and hydration status  - Monitor labs  - Assess for incontinence   - Turn and reposition patient  - Assist with mobility/ambulation  - Relieve pressure over mariano prominences   - Avoid friction and shearing  - Provide appropriate hygiene as needed including keeping skin clean and dry  - Evaluate need for skin moisturizer/barrier cream  - Collaborate with interdisciplinary team  - Patient/family teaching  - Consider wound care consult    Outcome: Progressing     Problem: Decreased Cardiac Output  Goal: Cardiac output adequate for individual needs  Description: INTERVENTIONS: Monitor for signs and symptoms of decreased cardiac output   - Monitor for dyspnea with exertion and at rest  - Monitor for orthopnea  - Monitor for signs of tachycardia. Place patient on telemetry monitoring.  - Assess patient for jugular vein distention  - Assess patient for lower extremity edema and poor peripheral perfusion   - Auscultate lung sound for Fine bibasilar crackles   - Monitor for cardiac arrythmias   - Administer beta blockers, antiarrhythmic, and blood pressure medications as ordered    Outcome: Progressing     Problem: Impaired Gas Exchange  Goal: Optimize oxygenation and ensure adequate ventilation  Description: INTERVENTIONS: Monitor for signs and symptoms of respiratory distress                - Elevate HOB or use high fowlers to promote lung expansion                 - Administer oxygen as ordered to maintain adequate oxygenation                - Encourage use of IS to promote lung expansion and prevent PN                - Monitor ABGs to assess oxygenation status                - Monitor blood oxygen level to maintain adequate oxygenation                - Encourage cough and deep breathing exercises to promote lung expansion                - Monitor patient's mental status for increased confusion    Outcome: Progressing     Problem: Excess Fluid Volume  Goal: Patient is able to achieve and maintain homeostasis  Description: INTERVENTIONS: Monitor for sign and symptoms of fluid overload  - Evaluate LE edema every shift  - Elevate LE to prevent dependent edema  - Apply BRIAN stockings as ordered   - Monitor ankle circumference daily  - Assess for jugular vein distention  - Evaluate provider orders for the CHF diuretic algorithm. Administer diuretics as ordered  - Weigh the patient daily at 0600 and report a weight gain of five pounds or more   - Strict intake and output  - Monitor fluid intake and adhere to fluid restrictions  - Assess lung sounds every shift and as needed  - Monitor vital signs and lab values (CBC, chem, BUN, BNP)  - Measure and document urine output    Outcome: Progressing     Problem: Activity Intolerance  Goal: Patient is able to perform activities within their limitations  Description: INTERVENTIONS:                       -   Alternate periods of activity with periods of rest                 -   Patients is able to maintain normal vitals heart rhythm during activity                 -   Gradually increase activity and exercise as patient can tolerate                 -   Monitor blood pressure and heart before and after exercise                  -   Monitor blood oxygen saturation during activity and apply oxygen as needed    Outcome: Progressing     Problem: Knowledge Deficit  Goal: Patient is able to verbalize understanding of Heart Failure after  education  Description: INTERVENTIONS:  - Educate the patient and family on signs and symptoms of HF  - Provide the patient with HF education and HF zone tool  - Educate on the importance of daily weight in the AM and reporting a weight gain               of 3 or more pounds to their primary care physician  - Monitor for SOB  - Maintain and sodium and fluid restriction  - Educate the patient on the importance of medications such as: diuretics, betablockers,               antiarrhythmics and their purpose, dose, route, side effects and labs               if they are needed    Outcome: Progressing

## 2025-07-22 NOTE — ASSESSMENT & PLAN NOTE
The sodium level was decreased down to 130.  Likely secondary to increased ADH in the setting of cirrhosis plus or minus increase free water intake.  Will modify fluid restriction of 1.5 L daily, restart torsemide 20 mg daily which patient was taking at home, also replace potassium which will help normalization of sodium levels.  Recheck BMP this afternoon.

## 2025-07-22 NOTE — ASSESSMENT & PLAN NOTE
Per nurse, patient mentation varies and fluctuates  During the rounds with this provider, patient was alert and oriented  We will try to continue lactulose, a dose of glycerin suppository ordered, also will use enema if needed for constipation  ammonia level 38

## 2025-07-22 NOTE — NURSING NOTE
Iv removed. Discharged from [facility]. Report called to receiving facility.

## 2025-07-22 NOTE — ASSESSMENT & PLAN NOTE
Baseline creatinine 0.9-1.1 mg/dL     Oliguric ATN KISHORE (contrast, Toradol, hypotension, lisinopril, spironolactone) versus HRS at the beginning of this admission.  The patient has status post Lasix stress test with robust urine output 5.4 L in 24 hours.  Lasix infusion discontinued July 19.  Diuretics have remained on hold.    Regarding plan of care today on July 22: The patient's creatinine is at her baseline at 1.  The patient is noted to have hypokalemia and hyponatremia.  Will replace potassium.  Will restart Demadex today as well as Aldactone.  I would not restart lisinopril as I think this just increases the risk of KISHORE for this patient.  Hemoglobin level is at 7.8 continue to monitor as it had been in the low to need regular the past 4 days.  Restarting diuretics including Aldactone and torsemide also will check orthostatic vital signs.

## 2025-07-22 NOTE — DISCHARGE SUMMARY
Discharge Summary - Hospitalist   Name: Bailey Olsen 54 y.o. female I MRN: [id number]  Unit/Bed#: -01 I Date of Admission: 7/16/2025   Date of Service: 7/22/2025 I Hospital Day: 6     Assessment & Plan  Acute metabolic encephalopathy  Per nurse, patient mentation varies and fluctuates  During the rounds with this provider, patient was alert and oriented  We will try to continue lactulose, a dose of glycerin suppository ordered, also will use enema if needed for constipation  ammonia level 38  Acute renal failure with tubular necrosis (HCC)  Baseline creatinine around 0.9  Creatinine is 1.7  Patient did not make enough urine  Suspect contrast induced ATN-secondary to contrast exposure, hypertension, medication side effect  Initially patient started Lasix drip with significant improvement, renal function is improving.  Lasix drip discontinued  Hypokalemia  Chronic hypokalemia, continue to supplement  Fall  Came after fall  Shows left periorbital bruise, multiple bruises on right lateral chest, right buttock area, left knee  Patient also has T12 fracture, right third finger fracture  PainManagement, case management  For vertebral fracture, discussed with neurosurgery, recommending TLSO brace, upright positions,  PT OT recommending level 2  7/19/25:Complaining of left upper abdominal and chest pain, early morning a piece of wall fell off on her chest and left shoulder area.  -X-ray of the left shoulder and left lateral ribs reviewed, no fracture noted   Closed fracture of phalanx of right middle finger  Secondary to fall, imaging reviewed  Continue pain management, embolization, can use finger splint  Follow orthopedic recommendation  Continue diane tape per orthopedic recommendation  Closed fracture of twelfth thoracic vertebra (HCC)  There is a new mild superior endplate compression fracture at T12 compared to May 2025. Status post kyphoplasty at L1. There is a healing fracture of the right anterior fourth  rib.   Appreciate neurosurgery recommendation, upright position, TLSO brace.    Has recent history of fall  Complaining of lower back pain  Continue pain management  No neurodeficit  Due to recommending level 2  Liver cirrhosis secondary to HYATT (nonalcoholic steatohepatitis) (HCC)  Continue conservative management with home medications.  Pancytopenia (HCC)  Secondary to liver cirrhosis, continue to monitor, no active bleeding noted  Morbid obesity with BMI of 40.0-44.9, adult (HCC)  Lifestyle modification.  Chronic, continuous use of opioids  Will use narcotics, continue to monitor.  During the run, discussed with patient and her  at the bedside in presence of case management and nurse-regarding pain management.  Patient has liver cirrhosis which can affect adequate pain management only using with narcotics due to concern of encephalopathy.  Verbalized understanding agrees.  Hyponatremia       Medical Problems       Resolved Problems  Date Reviewed: 6/14/2025          Resolved    Oliguria 7/21/2025     Resolved by  Aramis Love DO        Discharging Physician / Practitioner: Idalia Stuart MD  PCP: Nayely Brown DO  Admission Date:   Admission Orders (From admission, onward)       Ordered        07/16/25 1300  INPATIENT ADMISSION  Once                          Discharge Date: 07/22/25    Next Steps for Physician/AP Assuming Care:  Cbc,cmp in 1 week    Test Results Pending at Discharge (will require follow up):  none    Medication Changes for Discharge & Rationale:   See after visit summary for reconciled discharge medications provided to patient and/or family.     Consultations During Hospital Stay:  Ortho,nephrology,neurosurgery    Procedures Performed:   none    Significant Findings / Test Results:   XR shoulder 2+ vw left  Result Date: 7/20/2025  Impression: No acute osseous abnormality. Computerized Assisted Algorithm (CAA) may have been used to analyze all applicable images. Resident: Ysabel  Esthela MATTHEWS, the attending radiologist, have reviewed the images and agree with the final report above. Workstation performed: [id number]     XR ribs left w pa chest min 3 views  Result Date: 7/20/2025  Impression: No evidence of  rib fracture. Persistent patchy left upper lobe airspace opacity is similar to recent CT again suspicious for postinfectious/postinflammatory process. Computerized Assisted Algorithm (CAA) may have been used to analyze all applicable images. Resident: Ysabel Proctor I, the attending radiologist, have reviewed the images and agree with the final report above. Workstation performed: [id number]     XR spine thoracolumbar 2 vw  Result Date: 7/17/2025  Impression: No change T12, L1 compression fractures and L1 kyphoplasty. Workstation performed: [id number]     XR finger third digit-middle RIGHT  Result Date: 7/16/2025  Impression: Findings suspicious for a subtle fracture involving the base of the third middle phalanx at the PIP joint, ventrally and radially. This might be further assessed with MRI if deemed clinically appropriate for management. The study was marked in EPIC for immediate notification. Computerized Assisted Algorithm (CAA) may have been used to analyze all applicable images. Workstation performed: [id number]     XR knee 4+ vw left injury  Result Date: 7/16/2025  Impression: No acute osseous abnormality. Computerized Assisted Algorithm (CAA) may have been used to analyze all applicable images. Workstation performed: [id number]     CT chest abdomen pelvis w contrast  Result Date: 7/16/2025  Impression: 1.  Age-indeterminate superior endplate compression fracture at T12, not present in May 2025 and possibly acute. Clinical correlation recommended. 2.  No acute traumatic injury to the chest, abdomen or pelvis viscera. 3.  Cirrhosis and portal hypertension. 4.  New juxtapleural nodule in the medial left lower lobe measuring 3 mm with additional 3 mm nodules in the left upper and lower  lobes, stable from recent studies though not present in 2024. Based on current Fleischner Society 2017 Guidelines on incidental pulmonary nodule, no routine follow-up is needed if the patient is low risk. If the patient is high risk, optional follow-up chest CT at 12 months can be considered. 5.  Stable groundglass density in the left upper lobe, possibly postinflammatory scarring. The study was marked in EPIC for immediate notification. Computerized Assisted Algorithm (CAA) may have aided analysis of applicable images. Workstation performed: [id number]     CT head without contrast  Result Date: 7/16/2025  Impression: No acute intracranial abnormality. Workstation performed: [id number]      Incidental Findings:       Hospital Course:   Bailey Olsen is a 54 y.o. female patient who originally presented to the hospital on 7/16/2025 due to acute metabolic encephalopathy, acute renal failure with tubular necrosis hypokalemia recent fall and fracture of the right middle finger and 12th thoracic vertebra and known history of liver cirrhosis.  Patient presented with metabolic encephalopathy received lactulose and stool softeners and is currently at her baseline and having bowel movements.  Renal function initially was creatinine of 1.7 which is now back to baseline she was placed on a Lasix drip for diuresis and is now improved.  She also had a fall prior to admission with multiple bruises noted and also unfortunately had an injury at the hospital as well when bored from the wall fell on her.  She is currently doing well with pain control.  Will recommend discharge to subacute rehab today      Please see above list of diagnoses and related plan for additional information.     Discharge Day Visit / Exam:   Subjective: Patient denies any chest pain or shortness of breath she has some soreness from her recent fall and bruising noted  Vitals: Blood Pressure: 113/65 (07/22/25 1255)  Pulse: 76 (07/22/25 1255)  Temperature:  (!) 97 °F (36.1 °C) (07/22/25 0714)  Temp Source: Temporal (07/20/25 1601)  Respirations: 18 (07/22/25 0714)  Weight - Scale: 112 kg (245 lb 13 oz) (07/19/25 0622)  SpO2: 96 % (07/22/25 1021)  Physical Exam  Vitals and nursing note reviewed.   HENT:      Head: Normocephalic and atraumatic.      Right Ear: External ear normal.      Left Ear: External ear normal.      Nose: Nose normal.      Mouth/Throat:      Pharynx: Oropharynx is clear.     Eyes:      Pupils: Pupils are equal, round, and reactive to light.       Cardiovascular:      Rate and Rhythm: Normal rate and regular rhythm.      Heart sounds: Normal heart sounds.   Pulmonary:      Effort: Pulmonary effort is normal.      Breath sounds: Normal breath sounds.   Abdominal:      General: Bowel sounds are normal.      Palpations: Abdomen is soft.      Tenderness: There is no abdominal tenderness.     Musculoskeletal:         General: Normal range of motion.      Cervical back: Normal range of motion and neck supple.     Skin:     General: Skin is warm and dry.      Capillary Refill: Capillary refill takes less than 2 seconds.      Findings: Bruising present.     Neurological:      General: No focal deficit present.      Mental Status: She is alert and oriented to person, place, and time.      Comments: tenderness on palpation of the left side of the chest with bruising noted and on the ribs.  Mild psychomotor slowing noted   Psychiatric:         Mood and Affect: Mood normal.            Discussion with Family: will update family    Discharge instructions/Information to patient and family:   See after visit summary for information provided to patient and family.      Provisions for Follow-Up Care:  See after visit summary for information related to follow-up care and any pertinent home health orders.      Mobility at time of Discharge:   Basic Mobility Inpatient Raw Score: 11  JH-HLM Goal: 4: Move to chair/commode  JH-HLM Achieved: 4: Move to chair/commode  HLM  Goal NOT achieved. Continue to encourage mobility in post discharge setting.     Disposition:   Other Skilled Nursing Facility at [facility]    Planned Readmission: none    Administrative Statements   Discharge Statement:  I have spent a total time of 35 minutes in caring for this patient on the day of the visit/encounter. .    **Please Note: This note may have been constructed using a voice recognition system**

## 2025-07-23 NOTE — TELEPHONE ENCOUNTER
07/24/2025- CALLED FACILITY AND SPOKE WITH MARLENA CONFIRMING 08/01/2025 APT IN [place] WITH X-RAY NEEDED PRIOR. MARLENA WAS INFORMED THAT IF THE X-RAY WAS BEING DONE AT THE FACILITY A DISC OF THE IMAGES WOULD  BE NEEDED FOR THE APT. SCRIPT AND APT DETAILS WERE FAXED OVER [phone].    07/23/2025- PT DISCHARGED TO [facility] [phone]. CALLED FACILITY AND LEFT MESSAGE ON MACHINE TO CONFIRM THE APT. OFFICE PHONE NUMBER WAS PROVIDED FOR A CALL BACK.

## 2025-07-24 NOTE — UTILIZATION REVIEW
NOTIFICATION OF ADMISSION DISCHARGE   This is a Notification of Discharge from [facility]. Please be advised that this patient has been discharge from our facility. Below you will find the admission and discharge date and time including the patient’s disposition.   UTILIZATION REVIEW CONTACT:  Utilization Review Assistants  Network Utilization Review Department  Phone: [phone] x carefully listen to the prompts. All voicemails are confidential.  Email: [email or web address]     ADMISSION INFORMATION  PRESENTATION DATE: 7/16/2025  9:43 AM  OBERVATION ADMISSION DATE: N/A  INPATIENT ADMISSION DATE: 7/16/25  1:00 PM   DISCHARGE DATE: 7/22/2025  5:45 PM   DISPOSITION:Non [facility] SNF/TCU/SNU    Network Utilization Review Department  ATTENTION: Please call with any questions or concerns to [phone] and carefully listen to the prompts so that you are directed to the right person. All voicemails are confidential.   For Discharge needs, contact Care Management DC Support Team at [phone] opt. 2  Send all requests for admission clinical reviews, approved or denied determinations and any other requests to dedicated fax number below belonging to the campus where the patient is receiving treatment. List of dedicated fax numbers for the Facilities:  FACILITY NAME UR FAX NUMBER   ADMISSION DENIALS (Administrative/Medical Necessity) [phone]   DISCHARGE SUPPORT TEAM ([facility]) [phone]   PARENT CHILD HEALTH (Maternity/NICU/Pediatrics) [phone]   [facility] [phone]   [facility] [phone]   [facility] [phone]   [facility] [phone]   [facility] [phone]   [facility] [phone]   [facility] [phone]   [facility] [phone]   [facility]  [facility] [phone]   [facility] [phone]   General acute hospital [phone]   [facility] [phone]

## 2025-08-01 ENCOUNTER — OFFICE VISIT (OUTPATIENT)
Age: 55
End: 2025-08-01
Payer: COMMERCIAL

## 2025-08-01 VITALS
DIASTOLIC BLOOD PRESSURE: 70 MMHG | WEIGHT: 250 LBS | SYSTOLIC BLOOD PRESSURE: 130 MMHG | OXYGEN SATURATION: 95 % | BODY MASS INDEX: 40.18 KG/M2 | TEMPERATURE: 97.9 F | HEIGHT: 66 IN | RESPIRATION RATE: 20 BRPM

## 2025-08-01 DIAGNOSIS — S22.089A: Primary | ICD-10-CM

## 2025-08-01 PROCEDURE — 99214 OFFICE O/P EST MOD 30 MIN: CPT | Performed by: PHYSICIAN ASSISTANT

## 2025-08-11 ENCOUNTER — TELEPHONE (OUTPATIENT)
Age: 55
End: 2025-08-11

## 2025-08-21 PROBLEM — R53.1 GENERALIZED WEAKNESS: Status: ACTIVE | Noted: 2025-08-21

## 2025-08-21 PROBLEM — R94.31 PROLONGED QT INTERVAL: Status: ACTIVE | Noted: 2025-08-21
